# Patient Record
Sex: FEMALE | Race: BLACK OR AFRICAN AMERICAN | Employment: OTHER | URBAN - METROPOLITAN AREA
[De-identification: names, ages, dates, MRNs, and addresses within clinical notes are randomized per-mention and may not be internally consistent; named-entity substitution may affect disease eponyms.]

---

## 2017-01-06 RX ORDER — HYDROCODONE BITARTRATE AND ACETAMINOPHEN 10; 325 MG/1; MG/1
1 TABLET ORAL
Qty: 140 TAB | Refills: 0 | Status: SHIPPED | OUTPATIENT
Start: 2017-01-06 | End: 2017-02-02 | Stop reason: SDUPTHER

## 2017-01-13 ENCOUNTER — TELEPHONE (OUTPATIENT)
Dept: INTERNAL MEDICINE CLINIC | Age: 57
End: 2017-01-13

## 2017-01-13 RX ORDER — FENTANYL 75 UG/H
1 PATCH TRANSDERMAL
Qty: 10 PATCH | Refills: 0 | Status: SHIPPED | OUTPATIENT
Start: 2017-01-13 | End: 2017-02-22 | Stop reason: SDUPTHER

## 2017-02-03 RX ORDER — HYDROCODONE BITARTRATE AND ACETAMINOPHEN 10; 325 MG/1; MG/1
1 TABLET ORAL
Qty: 140 TAB | Refills: 0 | Status: SHIPPED | OUTPATIENT
Start: 2017-02-03 | End: 2017-03-10 | Stop reason: SDUPTHER

## 2017-02-23 ENCOUNTER — TELEPHONE (OUTPATIENT)
Dept: INTERNAL MEDICINE CLINIC | Age: 57
End: 2017-02-23

## 2017-02-23 RX ORDER — FENTANYL 75 UG/H
1 PATCH TRANSDERMAL
Qty: 10 PATCH | Refills: 0 | Status: SHIPPED | OUTPATIENT
Start: 2017-02-23 | End: 2017-04-06 | Stop reason: SDUPTHER

## 2017-03-10 ENCOUNTER — OFFICE VISIT (OUTPATIENT)
Dept: INTERNAL MEDICINE CLINIC | Age: 57
End: 2017-03-10

## 2017-03-10 VITALS
HEART RATE: 74 BPM | TEMPERATURE: 98.2 F | DIASTOLIC BLOOD PRESSURE: 70 MMHG | OXYGEN SATURATION: 95 % | WEIGHT: 198.2 LBS | HEIGHT: 71 IN | RESPIRATION RATE: 18 BRPM | BODY MASS INDEX: 27.75 KG/M2 | SYSTOLIC BLOOD PRESSURE: 122 MMHG

## 2017-03-10 DIAGNOSIS — I87.2 VENOUS INSUFFICIENCY: ICD-10-CM

## 2017-03-10 DIAGNOSIS — I10 ESSENTIAL HYPERTENSION: ICD-10-CM

## 2017-03-10 DIAGNOSIS — Z00.00 ROUTINE GENERAL MEDICAL EXAMINATION AT A HEALTH CARE FACILITY: ICD-10-CM

## 2017-03-10 DIAGNOSIS — D57.00 HB-SS DISEASE WITH CRISIS (HCC): Primary | ICD-10-CM

## 2017-03-10 DIAGNOSIS — B18.2 CHRONIC HEPATITIS C WITHOUT HEPATIC COMA (HCC): ICD-10-CM

## 2017-03-10 DIAGNOSIS — F32.A DEPRESSION, UNSPECIFIED DEPRESSION TYPE: ICD-10-CM

## 2017-03-10 RX ORDER — HYDROCODONE BITARTRATE AND ACETAMINOPHEN 10; 325 MG/1; MG/1
1 TABLET ORAL
Qty: 140 TAB | Refills: 0 | Status: SHIPPED | OUTPATIENT
Start: 2017-03-10 | End: 2017-04-27 | Stop reason: SDUPTHER

## 2017-03-10 NOTE — PROGRESS NOTES
Chief Complaint   Patient presents with    Sickle Cell Disease     3 month follow up    1. Have you been to the ER, urgent care clinic since your last visit? Hospitalized since your last visit? No    2. Have you seen or consulted any other health care providers outside of the [address] [place] [address] since your last visit? Include any pap smears or colon screening.  No

## 2017-03-11 NOTE — PROGRESS NOTES
[facility]  [place] [address]  [address] [place] [address]  Phone:  [phone]  Fax: [phone]       Chief Complaint   Patient presents with    Sickle Cell Disease     3 month follow up    . SUBJECTIVE:    Ciara Perez is a 49 y.o. female comes in for return visit. She is doing fairly well as far as her sickle cell disease is concerned although she did have a flare about a month ago lasting for about two weeks but it did not require hospitalization. Her analgesics help significantly. She has been successfully treated for her hepatitis C. She has been taking her antihypertensive medication as prescribed and has had no cardiovascular symptoms. She remains quite stable with no evidence of major depression. She does remain on her Celexa. Finally, she lives a sedentary life. We have talked about improving this on many occasions. Current Outpatient Prescriptions   Medication Sig Dispense Refill    HYDROcodone-acetaminophen (NORCO)  mg tablet Take 1 Tab by mouth every six (6) hours as needed for Pain. Max Daily Amount: 4 Tabs. 140 Tab 0    fentaNYL (DURAGESIC) 75 mcg/hr 1 Patch by TransDERmal route every seventy-two (72) hours. Max Daily Amount: 1 Patch. 10 Patch 0    folic acid (FOLVITE) 1 mg tablet TAKE 1 TABLET BY MOUTH EVERY DAY 90 Tab 3    citalopram (CELEXA) 10 mg tablet TAKE 1 TABLET BY MOUTH EVERY NIGHT AT BEDTIME 90 Tab 3    losartan-hydrochlorothiazide (HYZAAR) 50-12.5 mg per tablet TAKE 1 TABLET BY MOUTH DAILY 90 Tab 3    OMEPRAZOLE, BULK, Take 20 mg by mouth daily. 20 mg, PO, daily, 0 Refills      fluocinoNIDE (LIDEX) 0.05 % topical cream two (2) times daily as needed.        Past Medical History:   Diagnosis Date    Anemia     SC hemoglobinopathy    Chronic pain     Depression     Hypertension     Liver disease     hepatitis C     Past Surgical History:   Procedure Laterality Date    CHEST SURGERY PROCEDURE UNLISTED      status post thor for removal of a benign     HX CHOLECYSTECTOMY      HX ORTHOPAEDIC      bony curettage of an ostial myelitis focus     Allergies   Allergen Reactions    Dilaudid [Hydromorphone (Bulk)] Itching    Percocet [Oxycodone-Acetaminophen] Itching         REVIEW OF SYSTEMS:  General: negative for - chills or fever  ENT: negative for - headaches, nasal congestion or tinnitus  Respiratory: negative for - cough, hemoptysis, shortness of breath or wheezing  Cardiovascular : negative for - chest pain, edema, palpitations or shortness of breath  Gastrointestinal: negative for - abdominal pain, blood in stools, heartburn or nausea/vomiting  Genito-Urinary: no dysuria, trouble voiding, or hematuria  Musculoskeletal: negative for - gait disturbance, joint pain, joint stiffness or joint swelling  Neurological: no TIA or stroke symptoms  Hematologic: no bruises, no bleeding, no swollen glands  Integument: no lumps, mole changes, nail changes or rash  Endocrine: no malaise/lethargy or unexpected weight changes      Social History     Social History    Marital status:      Spouse name: N/A    Number of children: 2    Years of education: N/A     Occupational History    disabled---Sickle cell disease      Social History Main Topics    Smoking status: Never Smoker    Smokeless tobacco: Never Used    Alcohol use No    Drug use: No    Sexual activity: Yes     Partners: Male     Other Topics Concern    None     Social History Narrative     History reviewed. No pertinent family history. OBJECTIVE:    Visit Vitals    /70 (BP 1 Location: Left arm, BP Patient Position: Sitting)    Pulse 74    Temp 98.2 °F (36.8 °C) (Oral)    Resp 18    Ht 5' 11\" (1.803 m)    Wt 198 lb 3.2 oz (89.9 kg)    SpO2 95%    BMI 27.64 kg/m2     CONSTITUTIONAL: well , well nourished, appears age appropriate  EYES: perrla, eom intact  ENMT:moist mucous membranes, pharynx clear  NECK: supple.  Thyroid normal  RESPIRATORY: Chest: clear to ascultation and percussion   CARDIOVASCULAR: Heart: regular rate and rhythm  GASTROINTESTINAL: Abdomen: soft, bowel sounds active  HEMATOLOGIC: no pathological lymph nodes palpated  MUSCULOSKELETAL: Extremities: no edema, pulse 1+   INTEGUMENT: No unusual rashes or suspicious skin lesions noted. Nails appear normal.  NEUROLOGIC: non-focal exam   MENTAL STATUS: alert and oriented, appropriate affect      ASSESSMENT:  1. Hb-SS disease with crisis ([facility]    2. Essential hypertension    3. Depression, unspecified depression type    4. Chronic hepatitis C without hepatic coma ([facility] [address]    5. Venous insufficiency    6. Routine general medical examination at a health care facility        PLAN:    1. Her sickle cell disease appears to be doing fairly well. She will continue her analgesics as needed. 2. Blood pressure is excellent. No adjustments are made. 3. Her depression is quite stable and she will therefore continue the Celexa. 4. Her hepatitis C has been successfully treated. Her last viral load was undetectable. 5. I again encourage the patient to increase her physical activity. She continues to remain on a sedentary existence. This might help reduce her crises. 6.  She continues to have intermittent swelling of her lower extremities which is orthostatic in nature. This is related to venous insufficiency. .  Orders Placed This Encounter    HYDROcodone-acetaminophen (NORCO)  mg tablet         Follow-up Disposition:  Return in about 4 months (around 7/10/2017). Morro Flores MD    This is a Subsequent Medicare Annual Wellness Visit providing Personalized Prevention Plan Services (PPPS) (Performed 12 months after initial AWV and PPPS )    I have reviewed the patient's medical history in detail and updated the computerized patient record.      History     Past Medical History:   Diagnosis Date    Anemia     SC hemoglobinopathy    Chronic pain     Depression     Hypertension     Liver disease     hepatitis C      Past Surgical History:   Procedure Laterality Date    CHEST SURGERY PROCEDURE UNLISTED      status post thor for removal of a benign     HX CHOLECYSTECTOMY      HX ORTHOPAEDIC      bony curettage of an ostial myelitis focus     Current Outpatient Prescriptions   Medication Sig Dispense Refill    HYDROcodone-acetaminophen (NORCO)  mg tablet Take 1 Tab by mouth every six (6) hours as needed for Pain. Max Daily Amount: 4 Tabs. 140 Tab 0    fentaNYL (DURAGESIC) 75 mcg/hr 1 Patch by TransDERmal route every seventy-two (72) hours. Max Daily Amount: 1 Patch. 10 Patch 0    folic acid (FOLVITE) 1 mg tablet TAKE 1 TABLET BY MOUTH EVERY DAY 90 Tab 3    citalopram (CELEXA) 10 mg tablet TAKE 1 TABLET BY MOUTH EVERY NIGHT AT BEDTIME 90 Tab 3    losartan-hydrochlorothiazide (HYZAAR) 50-12.5 mg per tablet TAKE 1 TABLET BY MOUTH DAILY 90 Tab 3    OMEPRAZOLE, BULK, Take 20 mg by mouth daily. 20 mg, PO, daily, 0 Refills      fluocinoNIDE (LIDEX) 0.05 % topical cream two (2) times daily as needed. Allergies   Allergen Reactions    Dilaudid [Hydromorphone (Bulk)] Itching    Percocet [Oxycodone-Acetaminophen] Itching     History reviewed. No pertinent family history. Social History   Substance Use Topics    Smoking status: Never Smoker    Smokeless tobacco: Never Used    Alcohol use No     Patient Active Problem List   Diagnosis Code    Sickle cell anemia ([facility] D57.1    Hypertension I10    Venous insufficiency I87.2    Hepatitis C B19.20    Depression F32.9    Furuncle of axilla L02.429    Orbital fracture ([facility] S02.80XA       Depression Risk Factor Screening:     PHQ 2 / 9, over the last two weeks 10/3/2014   Little interest or pleasure in doing things Not at all   Feeling down, depressed or hopeless Not at all   Total Score PHQ 2 0     Alcohol Risk Factor Screening:    On any occasion during the past 3 months, have you had more than 3 drinks containing alcohol? No    Do you average more than 7 drinks per week? No      Functional Ability and Level of Safety:     Hearing Loss   none    Activities of Daily Living   Self-care. Requires assistance with: no ADLs    Fall Risk   No flowsheet data found. Abuse Screen   Patient is not abused    Review of Systems   A comprehensive review of systems was negative. Physical Examination     Evaluation of Cognitive Function:  Mood/affect:  happy  Appearance: age appropriate  Family member/caregiver input: na    Visit Vitals    /70 (BP 1 Location: Left arm, BP Patient Position: Sitting)    Pulse 74    Temp 98.2 °F (36.8 °C) (Oral)    Resp 18    Ht 5' 11\" (1.803 m)    Wt 198 lb 3.2 oz (89.9 kg)    SpO2 95%    BMI 27.64 kg/m2     General appearance: alert, cooperative, no distress, appears stated age  Neck: supple, symmetrical, trachea midline, no adenopathy, thyroid: not enlarged, symmetric, no tenderness/mass/nodules, no carotid bruit and no JVD  Lungs: clear to auscultation bilaterally  Heart: regular rate and rhythm, S1, S2 normal, no murmur, click, rub or gallop  Abdomen: soft, non-tender. Bowel sounds normal. No masses,  no organomegaly  Extremities: extremities normal, atraumatic, no cyanosis or edema  Neurologic: Grossly normal    Patient Care Team:  Serg Blanchard MD as PCP - General (Internal Medicine)    Advice/Referrals/Counseling   Education and counseling provided:  Are appropriate based on today's review and evaluation      Assessment/Plan       ICD-10-CM ICD-9-CM    1. Hb-SS disease with crisis ([facility] D57.00 282.62    2. Essential hypertension I10 401.9    3. Depression, unspecified depression type F32.9 311    4. Chronic hepatitis C without hepatic coma (HCC) B18.2 070.54    5. Venous insufficiency I87.2 459.81    6. Routine general medical examination at a health care facility Z00.00 V70.0    .

## 2017-04-06 ENCOUNTER — OFFICE VISIT (OUTPATIENT)
Dept: INTERNAL MEDICINE CLINIC | Age: 57
End: 2017-04-06

## 2017-04-06 VITALS
BODY MASS INDEX: 28.36 KG/M2 | WEIGHT: 202.6 LBS | HEIGHT: 71 IN | DIASTOLIC BLOOD PRESSURE: 74 MMHG | OXYGEN SATURATION: 91 % | TEMPERATURE: 98.6 F | SYSTOLIC BLOOD PRESSURE: 131 MMHG | HEART RATE: 77 BPM | RESPIRATION RATE: 18 BRPM

## 2017-04-06 DIAGNOSIS — F32.A DEPRESSION, UNSPECIFIED DEPRESSION TYPE: ICD-10-CM

## 2017-04-06 DIAGNOSIS — D57.00 HB-SS DISEASE WITH CRISIS (HCC): Primary | ICD-10-CM

## 2017-04-06 DIAGNOSIS — I10 ESSENTIAL HYPERTENSION: ICD-10-CM

## 2017-04-06 RX ORDER — FENTANYL 75 UG/H
1 PATCH TRANSDERMAL
Qty: 10 PATCH | Refills: 0 | Status: SHIPPED | OUTPATIENT
Start: 2017-04-06 | End: 2017-04-27 | Stop reason: SDUPTHER

## 2017-04-06 RX ORDER — FENTANYL 75 UG/H
1 PATCH TRANSDERMAL
Qty: 10 PATCH | Refills: 0 | Status: SHIPPED | OUTPATIENT
Start: 2017-04-06 | End: 2017-04-06 | Stop reason: SDUPTHER

## 2017-04-06 NOTE — MR AVS SNAPSHOT
Visit Information Date & Time Provider Department Dept. Phone Encounter #  
 4/6/2017 12:30 PM Gatito Nunez MD [facility] [phone] [id number] Follow-up Instructions Return keep old apt. Your Appointments 7/13/2017 11:45 AM  
Any with Gatito Nunez MD  
[address] [place] [address] and [facility]) Appt Note: 4 MONTH FOLLOW UP  
 [address] [facility] [place]  
  
   
 [address] [id number] Upcoming Health Maintenance Date Due FOBT Q 1 YEAR AGE 50-75 4/2/2016 Pneumococcal 19-64 Highest Risk (2 of 3 - PCV13) 5/17/2017 BREAST CANCER SCRN MAMMOGRAM 9/17/2017 PAP AKA CERVICAL CYTOLOGY 4/2/2018 DTaP/Tdap/Td series (2 - Td) 5/17/2026 Allergies as of 4/6/2017  Review Complete On: 4/6/2017 By: Gatito Nunez MD  
  
 Severity Noted Reaction Type Reactions Dilaudid [Hydromorphone (Bulk)]  10/03/2014    Itching Percocet [Oxycodone-acetaminophen]  10/03/2014    Itching Current Immunizations  Never Reviewed No immunizations on file. Not reviewed this visit You Were Diagnosed With   
  
 Codes Comments Hb-SS disease with crisis [facility])    -  Primary ICD-10-CM: D57.00 ICD-9-CM: 282.62 Essential hypertension     ICD-10-CM: I10 
ICD-9-CM: 401.9 Depression, unspecified depression type     ICD-10-CM: F32.9 ICD-9-CM: 375 Vitals BP Pulse Temp Resp Height(growth percentile) Weight(growth percentile) 131/74 (BP 1 Location: Left arm, BP Patient Position: Sitting) 77 98.6 °F (37 °C) (Oral) 18 5' 11\" (1.803 m) 202 lb 9.6 oz (91.9 kg) SpO2 BMI OB Status Smoking Status 91% 28.26 kg/m2 Having regular periods Never Smoker BMI and BSA Data Body Mass Index Body Surface Area  
 28.26 kg/m 2 2.15 m 2 Preferred Pharmacy Pharmacy Name Phone [phone]  [phone] Your Updated Medication List  
  
   
This list is accurate as of: 17  1:20 PM.  Always use your most recent med list.  
  
  
  
  
 citalopram 10 mg tablet Commonly known as:  CELEXA  
TAKE 1 TABLET BY MOUTH EVERY NIGHT AT BEDTIME  
  
 fentaNYL 75 mcg/hr Commonly known as:  DURAGESIC  
1 Patch by TransDERmal route every seventy-two (72) hours. Max Daily Amount: 1 Patch. fluocinoNIDE 0.05 % topical cream  
Commonly known as:  LIDEX  
two (2) times daily as needed. folic acid 1 mg tablet Commonly known as:  FOLVITE  
TAKE 1 TABLET BY MOUTH EVERY DAY  
  
 HYDROcodone-acetaminophen  mg tablet Commonly known as:  Landon Kilts Take 1 Tab by mouth every six (6) hours as needed for Pain. Max Daily Amount: 4 Tabs. losartan-hydroCHLOROthiazide 50-12.5 mg per tablet Commonly known as:  HYZAAR  
TAKE 1 TABLET BY MOUTH DAILY  
  
 OMEPRAZOLE (BULK) Take 20 mg by mouth daily. 20 mg, PO, daily, 0 Refills Prescriptions Printed Refills  
 fentaNYL (DURAGESIC) 75 mcg/hr 0 Si Patch by TransDERmal route every seventy-two (72) hours. Max Daily Amount: 1 Patch. Class: Print Route: TransDERmal  
  
Follow-up Instructions Return keep old apt. Introducing \[facility]\"" & HEALTH SERVICES! [facility] introduces [employer] patient portal. Now you can access parts of your medical record, email your doctor's office, and request medication refills online. 1. In your internet browser, go to [email or web address] 2. Click on the First Time User? Click Here link in the Sign In box. You will see the New Member Sign Up page. 3. Enter your [employer] Access Code exactly as it appears below. You will not need to use this code after youve completed the sign-up process.  If you do not sign up before the expiration date, you must request a new code. · [employer] Access Code: [email or web address] Expires: 6/8/2017  3:47 PM 
 
4. Enter the last four digits of your Social Security Number (xxxx) and Date of Birth (mm/dd/yyyy) as indicated and click Submit. You will be taken to the next sign-up page. 5. Create a ActiveEon ID. This will be your ActiveEon login ID and cannot be changed, so think of one that is secure and easy to remember. 6. Create a [employer] password. You can change your password at any time. 7. Enter your Password Reset Question and Answer. This can be used at a later time if you forget your password. 8. Enter your e-mail address. You will receive e-mail notification when new information is available in [address] 9. Click Sign Up. You can now view and download portions of your medical record. 10. Click the Download Summary menu link to download a portable copy of your medical information. If you have questions, please visit the Frequently Asked Questions section of the [employer] website. Remember, ActiveEon is NOT to be used for urgent needs. For medical emergencies, dial 911. Now available from your iPhone and Android! Please provide this summary of care documentation to your next provider. Your primary care clinician is listed as Ezequiel Lynne. If you have any questions after today's visit, please call [phone].

## 2017-04-06 NOTE — PROGRESS NOTES
Chief Complaint   Patient presents with    Medication Refill     Fentanyl   1. Have you been to the ER, urgent care clinic since your last visit? Hospitalized since your last visit? No    2. Have you seen or consulted any other health care providers outside of the [address] [place] [address] since your last visit? Include any pap smears or colon screening.  No

## 2017-04-06 NOTE — PROGRESS NOTES
Chief Complaint   Patient presents with    Medication Refill     Fentanyl   . SUBECTIVE:    Carolyn Trinidad is a 49 y.o. female comes in for a return visit complaining of increasing pain in the pelvic area and her back. These are manifestations of her sickle crises. She needs a refill of her fentanyl. She does have her hydrocodone. Fortunately, she has not felt that she has had to go to the emergency room. She has been taking her antihypertensive medication as prescribed. She is quite stable as far as her depression is concerned. Current Outpatient Prescriptions   Medication Sig Dispense Refill    fentaNYL (DURAGESIC) 75 mcg/hr 1 Patch by TransDERmal route every seventy-two (72) hours. Max Daily Amount: 1 Patch. 10 Patch 0    HYDROcodone-acetaminophen (NORCO)  mg tablet Take 1 Tab by mouth every six (6) hours as needed for Pain. Max Daily Amount: 4 Tabs. 646 Tab 0    folic acid (FOLVITE) 1 mg tablet TAKE 1 TABLET BY MOUTH EVERY DAY 90 Tab 3    citalopram (CELEXA) 10 mg tablet TAKE 1 TABLET BY MOUTH EVERY NIGHT AT BEDTIME 90 Tab 3    losartan-hydrochlorothiazide (HYZAAR) 50-12.5 mg per tablet TAKE 1 TABLET BY MOUTH DAILY 90 Tab 3    OMEPRAZOLE, BULK, Take 20 mg by mouth daily. 20 mg, PO, daily, 0 Refills      fluocinoNIDE (LIDEX) 0.05 % topical cream two (2) times daily as needed.        Past Medical History:   Diagnosis Date    Anemia     SC hemoglobinopathy    Chronic pain     Depression     Hypertension     Liver disease     hepatitis C     Past Surgical History:   Procedure Laterality Date    CHEST SURGERY PROCEDURE UNLISTED      status post thor for removal of a benign     HX CHOLECYSTECTOMY      HX ORTHOPAEDIC      bony curettage of an ostial myelitis focus     Allergies   Allergen Reactions    Dilaudid [Hydromorphone (Bulk)] Itching    Percocet [Oxycodone-Acetaminophen] Itching       REVIEW OF SYSTEMS:  Review of Systems - Negative except   ENT ROS: negative for - headaches, hearing change, nasal congestion, oral lesions, tinnitus, visual changes or   Respiratory ROS: no cough, shortness of breath, or wheezing  Cardiovascular ROS: no chest pain or dyspnea on exertion  Gastrointestinal ROS: no abdominal pain, change in bowel habits, or black or blood  Genito-Urinary ROS: no dysuria, trouble voiding, or hematuria  Musculoskeletal ROS: negative  Neurological ROS: no TIA or stroke symptoms      Social History     Social History    Marital status:      Spouse name: N/A    Number of children: 2    Years of education: N/A     Occupational History    disabled---Sickle cell disease      Social History Main Topics    Smoking status: Never Smoker    Smokeless tobacco: Never Used    Alcohol use No    Drug use: No    Sexual activity: Yes     Partners: Male     Other Topics Concern    None     Social History Narrative   r  History reviewed. No pertinent family history. OBJECTIVE:  Visit Vitals    /74 (BP 1 Location: Left arm, BP Patient Position: Sitting)    Pulse 77    Temp 98.6 °F (37 °C) (Oral)    Resp 18    Ht 5' 11\" (1.803 m)    Wt 202 lb 9.6 oz (91.9 kg)    SpO2 91%    BMI 28.26 kg/m2     ENT: perrla,  eom intact  NECK: supple. Thyroid normal, no JVD  CHEST: clear to ascultation and percussion   HEART: regular rate and rhythm  ABD: soft, bowel sounds active,   EXTREMITIES: no edema, pulse 1+  INTEGUMENT: clear      ASSESSMENT:  1. Hb-SS disease with crisis ([address]    2. Essential hypertension    3. Depression, unspecified depression type        PLAN:    1. Her sickle cell disease is mild to moderate at this point. She will be given a fentanyl patch. 2. Her blood pressure is excellent today. 3. Her depression is quite stable.             .  Orders Placed This Encounter    DISCONTD: fentaNYL (DURAGESIC) 75 mcg/hr    fentaNYL ([address]) 75 mcg/hr       Follow-up Disposition:  Return keep old apt.      Anuel Archer MD

## 2017-04-27 ENCOUNTER — OFFICE VISIT (OUTPATIENT)
Dept: INTERNAL MEDICINE CLINIC | Age: 57
End: 2017-04-27

## 2017-04-27 VITALS
BODY MASS INDEX: 27.96 KG/M2 | RESPIRATION RATE: 18 BRPM | HEIGHT: 71 IN | HEART RATE: 79 BPM | OXYGEN SATURATION: 93 % | TEMPERATURE: 98.1 F | SYSTOLIC BLOOD PRESSURE: 111 MMHG | WEIGHT: 199.7 LBS | DIASTOLIC BLOOD PRESSURE: 73 MMHG

## 2017-04-27 DIAGNOSIS — D57.00 HB-SS DISEASE WITH CRISIS (HCC): Primary | ICD-10-CM

## 2017-04-27 DIAGNOSIS — F32.A DEPRESSION, UNSPECIFIED DEPRESSION TYPE: ICD-10-CM

## 2017-04-27 DIAGNOSIS — I10 ESSENTIAL HYPERTENSION: ICD-10-CM

## 2017-04-27 DIAGNOSIS — N39.0 URINARY TRACT INFECTION WITHOUT HEMATURIA, SITE UNSPECIFIED: ICD-10-CM

## 2017-04-27 RX ORDER — HYDROCODONE BITARTRATE AND ACETAMINOPHEN 10; 325 MG/1; MG/1
1 TABLET ORAL
Qty: 140 TAB | Refills: 0 | Status: SHIPPED | OUTPATIENT
Start: 2017-04-27 | End: 2017-06-13 | Stop reason: SDUPTHER

## 2017-04-27 RX ORDER — FENTANYL 75 UG/H
1 PATCH TRANSDERMAL
Qty: 10 PATCH | Refills: 0 | Status: SHIPPED | OUTPATIENT
Start: 2017-04-27 | End: 2017-07-14 | Stop reason: SDUPTHER

## 2017-04-27 NOTE — MR AVS SNAPSHOT
Visit Information Date & Time Provider Department Dept. Phone Encounter #  
 4/27/2017 11:30 AM Elliott Cosme MD [facility] and Primary [facility] [phone] [id number] Your Appointments 7/13/2017 11:45 AM  
Any with Elliott Cosme MD  
[address] [place] [address] and Primary Care [facility]) Appt Note: 4 MONTH FOLLOW UP  
 [address] 1 [facility]  
  
   
 [address] [id number] Upcoming Health Maintenance Date Due FOBT Q 1 YEAR AGE 50-75 4/2/2016 Pneumococcal 19-64 Highest Risk (2 of 3 - PCV13) 5/17/2017 BREAST CANCER SCRN MAMMOGRAM 9/17/2017 PAP AKA CERVICAL CYTOLOGY 4/2/2018 DTaP/Tdap/Td series (2 - Td) 5/17/2026 Allergies as of 4/27/2017  Review Complete On: 4/27/2017 By: Julianna Kohli Severity Noted Reaction Type Reactions Dilaudid [Hydromorphone (Bulk)]  10/03/2014    Itching Percocet [Oxycodone-acetaminophen]  10/03/2014    Itching Current Immunizations  Never Reviewed No immunizations on file. Not reviewed this visit You Were Diagnosed With   
  
 Codes Comments Hb-SS disease with crisis [facility])    -  Primary ICD-10-CM: D57.00 ICD-9-CM: 282.62 Essential hypertension     ICD-10-CM: I10 
ICD-9-CM: 401.9 Urinary tract infection without hematuria, site unspecified     ICD-10-CM: N39.0 ICD-9-CM: 599.0 Depression, unspecified depression type     ICD-10-CM: F32.9 ICD-9-CM: 654 Vitals BP Pulse Temp Resp Height(growth percentile) Weight(growth percentile) 111/73 (BP 1 Location: Right arm, BP Patient Position: Sitting) 79 98.1 °F (36.7 °C) (Oral) 18 5' 11\" (1.803 m) 199 lb 11.2 oz (90.6 kg) SpO2 BMI OB Status Smoking Status 93% 27.85 kg/m2 Postmenopausal Never Smoker BMI and BSA Data Body Mass Index Body Surface Area  
 27.85 kg/m 2 2.13 m 2 Preferred Pharmacy Pharmacy Name Phone [address] [phone] Your Updated Medication List  
  
   
This list is accurate as of: 17  2:30 PM.  Always use your most recent med list.  
  
  
  
  
 fentaNYL 75 mcg/hr Commonly known as:  DURAGESIC  
1 Patch by TransDERmal route every seventy-two (72) hours. Max Daily Amount: 1 Patch. fluocinoNIDE 0.05 % topical cream  
Commonly known as:  LIDEX  
two (2) times daily as needed. folic acid 1 mg tablet Commonly known as:  FOLVITE  
TAKE 1 TABLET BY MOUTH EVERY DAY  
  
 HYDROcodone-acetaminophen  mg tablet Commonly known as:  [place] Take 1 Tab by mouth every six (6) hours as needed for Pain. Max Daily Amount: 4 Tabs. KEFLEX 500 mg capsule Generic drug:  cephALEXin Take 500 mg by mouth four (4) times daily. losartan-hydroCHLOROthiazide 50-12.5 mg per tablet Commonly known as:  HYZAAR  
TAKE 1 TABLET BY MOUTH DAILY  
  
 metoclopramide HCl 10 mg tablet Commonly known as:  REGLAN Take 10 mg by mouth four (4) times daily as needed. OMEPRAZOLE (BULK) Take 20 mg by mouth daily. 20 mg, PO, daily, 0 Refills Prescriptions Printed Refills HYDROcodone-acetaminophen (NORCO)  mg tablet 0 Sig: Take 1 Tab by mouth every six (6) hours as needed for Pain. Max Daily Amount: 4 Tabs. Class: Print Route: Oral  
 fentaNYL (DURAGESIC) 75 mcg/hr 0 Si Patch by TransDERmal route every seventy-two (72) hours. Max Daily Amount: 1 Patch. Class: Print Route: TransDERmal  
  
We Performed the Following URINALYSIS W/ RFLX MICROSCOPIC [24846 CPT(R)] Introducing [facility] & HEALTH SERVICES! Anh Nichole introduces [facility] patient portal. Now you can access parts of your medical record, email your doctor's office, and request medication refills online.    
 
1. In your internet browser, go to [email or web address] 2. Click on the First Time User? Click Here link in the Sign In box. You will see the New Member Sign Up page. 3. Enter your Peek Access Code exactly as it appears below. You will not need to use this code after youve completed the sign-up process. If you do not sign up before the expiration date, you must request a new code. · Peek Access Code: [email or web address] Expires: 6/8/2017  3:47 PM 
 
4. Enter the last four digits of your Social Security Number (xxxx) and Date of Birth (mm/dd/yyyy) as indicated and click Submit. You will be taken to the next sign-up page. 5. Create a HERCAMOSHOPt ID. This will be your Peek login ID and cannot be changed, so think of one that is secure and easy to remember. 6. Create a Peek password. You can change your password at any time. 7. Enter your Password Reset Question and Answer. This can be used at a later time if you forget your password. 8. Enter your e-mail address. You will receive e-mail notification when new information is available in [address] 9. Click Sign Up. You can now view and download portions of your medical record. 10. Click the Download Summary menu link to download a portable copy of your medical information. If you have questions, please visit the Frequently Asked Questions section of the Peek website. Remember, Peek is NOT to be used for urgent needs. For medical emergencies, dial 911. Now available from your iPhone and Android! Please provide this summary of care documentation to your next provider. Your primary care clinician is listed as Ezequiel Lynne. If you have any questions after today's visit, please call [phone].

## 2017-04-27 NOTE — PROGRESS NOTES
1. Have you been to the ER, urgent care clinic since your last visit? Hospitalized since your last visit? Yes Where: [facility]    2. Have you seen or consulted any other health care providers outside of the [address] [place] [address] since your last visit? Include any pap smears or colon screening.  Yes Reason for visit: UTI

## 2017-04-29 LAB
APPEARANCE UR: ABNORMAL
BILIRUB UR QL STRIP: NEGATIVE
COLOR UR: YELLOW
GLUCOSE UR QL: NEGATIVE
HGB UR QL STRIP: NEGATIVE
KETONES UR QL STRIP: NEGATIVE
LEUKOCYTE ESTERASE UR QL STRIP: NEGATIVE
MICRO URNS: ABNORMAL
NITRITE UR QL STRIP: NEGATIVE
PH UR STRIP: 6 [PH] (ref 5–7.5)
PROT UR QL STRIP: NEGATIVE
SP GR UR: 1.01 (ref 1–1.03)
UROBILINOGEN UR STRIP-MCNC: 1 MG/DL (ref 0.2–1)

## 2017-04-29 NOTE — PROGRESS NOTES
[facility] and Primary Care  [place] [address]  [address] [facility] [address]  Phone:  [phone]  Fax: [phone]       Chief Complaint   Patient presents with   [facility] Follow Up   . SUBJECTIVE:    Lindsey Bianchi is a 49 y.o. female Subjective:  Comes in for return visit stating that she has done well other than having gone to the emergency room because of recurrent nausea and vomiting. She was evaluated and the only thing that was found was a urinary tract infection. She had no fever, chills or sweats, as well as back pain. She was treated with antibiotics. She is actually better now. She continues with her chronic pain relating to sickle cell disease and needs refills on analgesics. She has lost weight over the last year or so and wonders if she needs to continue the antihypertensive medication. Her depression is doing extremely well. She is status post treatment of her hepatitis C with [facility], which was successful at eradicating the infection. Current Outpatient Prescriptions   Medication Sig Dispense Refill    HYDROcodone-acetaminophen (NORCO)  mg tablet Take 1 Tab by mouth every six (6) hours as needed for Pain. Max Daily Amount: 4 Tabs. 140 Tab 0    fentaNYL (DURAGESIC) 75 mcg/hr 1 Patch by TransDERmal route every seventy-two (72) hours. Max Daily Amount: 1 Patch. 10 Patch 0    metoclopramide HCl (REGLAN) 10 mg tablet Take 10 mg by mouth four (4) times daily as needed.  cephALEXin (KEFLEX) 500 mg capsule Take 500 mg by mouth four (4) times daily.  folic acid (FOLVITE) 1 mg tablet TAKE 1 TABLET BY MOUTH EVERY DAY 90 Tab 3    losartan-hydrochlorothiazide (HYZAAR) 50-12.5 mg per tablet TAKE 1 TABLET BY MOUTH DAILY 90 Tab 3    OMEPRAZOLE, BULK, Take 20 mg by mouth daily. 20 mg, PO, daily, 0 Refills      fluocinoNIDE (LIDEX) 0.05 % topical cream two (2) times daily as needed.        Past Medical History:   Diagnosis Date    Anemia SC hemoglobinopathy    Chronic pain     Depression     Hypertension     Liver disease     hepatitis C     Past Surgical History:   Procedure Laterality Date    CHEST SURGERY PROCEDURE UNLISTED      status post thor for removal of a benign     HX CHOLECYSTECTOMY      HX ORTHOPAEDIC      bony curettage of an ostial myelitis focus     Allergies   Allergen Reactions    Dilaudid [Hydromorphone (Bulk)] Itching    Percocet [Oxycodone-Acetaminophen] Itching         REVIEW OF SYSTEMS:  General: negative for - chills or fever  ENT: negative for - headaches, nasal congestion or tinnitus  Respiratory: negative for - cough, hemoptysis, shortness of breath or wheezing  Cardiovascular : negative for - chest pain, edema, palpitations or shortness of breath  Gastrointestinal: negative for - abdominal pain, blood in stools, heartburn or nausea/vomiting  Genito-Urinary: no dysuria, trouble voiding, or hematuria  Musculoskeletal: negative for - gait disturbance, joint pain, joint stiffness or joint swelling  Neurological: no TIA or stroke symptoms  Hematologic: no bruises, no bleeding, no swollen glands  Integument: no lumps, mole changes, nail changes or rash  Endocrine: no malaise/lethargy or unexpected weight changes      Social History     Social History    Marital status:      Spouse name: N/A    Number of children: 2    Years of education: N/A     Occupational History    disabled---Sickle cell disease      Social History Main Topics    Smoking status: Never Smoker    Smokeless tobacco: Never Used    Alcohol use No    Drug use: No    Sexual activity: Yes     Partners: Male     Other Topics Concern    None     Social History Narrative     History reviewed. No pertinent family history.     OBJECTIVE:    Visit Vitals    /73 (BP 1 Location: Right arm, BP Patient Position: Sitting)    Pulse 79    Temp 98.1 °F (36.7 °C) (Oral)    Resp 18    Ht 5' 11\" (1.803 m)    Wt 199 lb 11.2 oz (90.6 kg)    SpO2 93%    BMI 27.85 kg/m2     CONSTITUTIONAL: well , well nourished, appears age appropriate  EYES: perrla, eom intact  ENMT:moist mucous membranes, pharynx clear  NECK: supple. Thyroid normal  RESPIRATORY: Chest: clear to ascultation and percussion   CARDIOVASCULAR: Heart: regular rate and rhythm  GASTROINTESTINAL: Abdomen: soft, bowel sounds active  HEMATOLOGIC: no pathological lymph nodes palpated  MUSCULOSKELETAL: Extremities: no edema, pulse 1+   INTEGUMENT: No unusual rashes or suspicious skin lesions noted. Nails appear normal.  NEUROLOGIC: non-focal exam   MENTAL STATUS: alert and oriented, appropriate affect      ASSESSMENT:  1. Hb-SS disease with crisis ([address]    2. Essential hypertension    3. Urinary tract infection without hematuria, site unspecified    4. Depression, unspecified depression type        PLAN:  Assessment/Plan:  1. Her sickle cell disease appears to be doing fairly well. She will continue analgesics as prescribed. This has been an effective tool to minimize hospitalization. 2. Blood pressure is excellent today, no adjustments are made. 3. She had a urinary tract infection and it does not appear that there were any upper tract symptoms associated with it. Urinalysis will be checked. 4. Her depression is quite stable, no adjustments are made. 5. I encouraged her to increase her physical activity. .  Orders Placed This Encounter    URINALYSIS W/ RFLX MICROSCOPIC    HYDROcodone-acetaminophen (NORCO)  mg tablet    fentaNYL (DURAGESIC) 75 mcg/hr         Follow-up Disposition:  Return in about 3 months (around 7/27/2017).       Michelle Hugo MD

## 2017-05-22 ENCOUNTER — APPOINTMENT (OUTPATIENT)
Dept: GENERAL RADIOLOGY | Age: 57
DRG: 812 | End: 2017-05-22
Attending: EMERGENCY MEDICINE
Payer: MEDICARE

## 2017-05-22 ENCOUNTER — HOSPITAL ENCOUNTER (INPATIENT)
Age: 57
LOS: 10 days | Discharge: HOME | DRG: 812 | End: 2017-06-01
Attending: EMERGENCY MEDICINE | Admitting: INTERNAL MEDICINE
Payer: MEDICARE

## 2017-05-22 DIAGNOSIS — D57.00 SICKLE CELL PAIN CRISIS (HCC): Primary | ICD-10-CM

## 2017-05-22 LAB
ALBUMIN SERPL BCP-MCNC: 4.2 G/DL (ref 3.5–5)
ALBUMIN/GLOB SERPL: 0.8 {RATIO} (ref 1.1–2.2)
ALP SERPL-CCNC: 106 U/L (ref 45–117)
ALT SERPL-CCNC: 15 U/L (ref 12–78)
ANION GAP BLD CALC-SCNC: 10 MMOL/L (ref 5–15)
APPEARANCE UR: CLEAR
AST SERPL W P-5'-P-CCNC: 41 U/L (ref 15–37)
BACTERIA URNS QL MICRO: NEGATIVE /HPF
BASOPHILS # BLD AUTO: 0 K/UL (ref 0–0.1)
BASOPHILS # BLD: 0 % (ref 0–1)
BILIRUB SERPL-MCNC: 2.3 MG/DL (ref 0.2–1)
BILIRUB UR QL: NEGATIVE
BUN SERPL-MCNC: 11 MG/DL (ref 6–20)
BUN/CREAT SERPL: 12 (ref 12–20)
CALCIUM SERPL-MCNC: 9.3 MG/DL (ref 8.5–10.1)
CHLORIDE SERPL-SCNC: 108 MMOL/L (ref 97–108)
CO2 SERPL-SCNC: 23 MMOL/L (ref 21–32)
COLOR UR: NORMAL
CREAT SERPL-MCNC: 0.91 MG/DL (ref 0.55–1.02)
EOSINOPHIL # BLD: 0 K/UL (ref 0–0.4)
EOSINOPHIL NFR BLD: 0 % (ref 0–7)
EPITH CASTS URNS QL MICRO: NORMAL /LPF
ERYTHROCYTE [DISTWIDTH] IN BLOOD BY AUTOMATED COUNT: 14.4 % (ref 11.5–14.5)
GLOBULIN SER CALC-MCNC: 5.4 G/DL (ref 2–4)
GLUCOSE SERPL-MCNC: 97 MG/DL (ref 65–100)
GLUCOSE UR STRIP.AUTO-MCNC: NEGATIVE MG/DL
HCT VFR BLD AUTO: 25.8 % (ref 35–47)
HGB BLD-MCNC: 8.8 G/DL (ref 11.5–16)
HGB UR QL STRIP: NEGATIVE
HYALINE CASTS URNS QL MICRO: NORMAL /LPF (ref 0–5)
KETONES UR QL STRIP.AUTO: NEGATIVE MG/DL
LEUKOCYTE ESTERASE UR QL STRIP.AUTO: NEGATIVE
LYMPHOCYTES # BLD AUTO: 36 % (ref 12–49)
LYMPHOCYTES # BLD: 2.4 K/UL (ref 0.8–3.5)
MCH RBC QN AUTO: 32.7 PG (ref 26–34)
MCHC RBC AUTO-ENTMCNC: 34.1 G/DL (ref 30–36.5)
MCV RBC AUTO: 95.9 FL (ref 80–99)
MONOCYTES # BLD: 1 K/UL (ref 0–1)
MONOCYTES NFR BLD AUTO: 14 % (ref 5–13)
NEUTS SEG # BLD: 3.3 K/UL (ref 1.8–8)
NEUTS SEG NFR BLD AUTO: 50 % (ref 32–75)
NITRITE UR QL STRIP.AUTO: NEGATIVE
PH UR STRIP: 6.5 [PH] (ref 5–8)
PLATELET # BLD AUTO: 277 K/UL (ref 150–400)
POTASSIUM SERPL-SCNC: 3.3 MMOL/L (ref 3.5–5.1)
PROT SERPL-MCNC: 9.6 G/DL (ref 6.4–8.2)
PROT UR STRIP-MCNC: NEGATIVE MG/DL
RBC # BLD AUTO: 2.69 M/UL (ref 3.8–5.2)
RBC #/AREA URNS HPF: NORMAL /HPF (ref 0–5)
RETICS/RBC NFR AUTO: 4.3 % (ref 0.7–2.1)
SODIUM SERPL-SCNC: 141 MMOL/L (ref 136–145)
SP GR UR REFRACTOMETRY: 1.01 (ref 1–1.03)
UA: UC IF INDICATED,UAUC: NORMAL
UROBILINOGEN UR QL STRIP.AUTO: 1 EU/DL (ref 0.2–1)
WBC # BLD AUTO: 6.8 K/UL (ref 3.6–11)
WBC URNS QL MICRO: NORMAL /HPF (ref 0–4)

## 2017-05-22 PROCEDURE — 74011250636 HC RX REV CODE- 250/636: Performed by: EMERGENCY MEDICINE

## 2017-05-22 PROCEDURE — 36415 COLL VENOUS BLD VENIPUNCTURE: CPT | Performed by: EMERGENCY MEDICINE

## 2017-05-22 PROCEDURE — 80053 COMPREHEN METABOLIC PANEL: CPT | Performed by: EMERGENCY MEDICINE

## 2017-05-22 PROCEDURE — 76937 US GUIDE VASCULAR ACCESS: CPT

## 2017-05-22 PROCEDURE — 85025 COMPLETE CBC W/AUTO DIFF WBC: CPT | Performed by: EMERGENCY MEDICINE

## 2017-05-22 PROCEDURE — 74011250636 HC RX REV CODE- 250/636: Performed by: INTERNAL MEDICINE

## 2017-05-22 PROCEDURE — 85045 AUTOMATED RETICULOCYTE COUNT: CPT | Performed by: EMERGENCY MEDICINE

## 2017-05-22 PROCEDURE — 96360 HYDRATION IV INFUSION INIT: CPT

## 2017-05-22 PROCEDURE — 65660000000 HC RM CCU STEPDOWN

## 2017-05-22 PROCEDURE — 96372 THER/PROPH/DIAG INJ SC/IM: CPT

## 2017-05-22 PROCEDURE — 74011250637 HC RX REV CODE- 250/637: Performed by: INTERNAL MEDICINE

## 2017-05-22 PROCEDURE — 81001 URINALYSIS AUTO W/SCOPE: CPT | Performed by: EMERGENCY MEDICINE

## 2017-05-22 PROCEDURE — 93005 ELECTROCARDIOGRAM TRACING: CPT

## 2017-05-22 PROCEDURE — 74011250637 HC RX REV CODE- 250/637: Performed by: EMERGENCY MEDICINE

## 2017-05-22 PROCEDURE — 99284 EMERGENCY DEPT VISIT MOD MDM: CPT

## 2017-05-22 PROCEDURE — 71020 XR CHEST PA LAT: CPT

## 2017-05-22 RX ORDER — ONDANSETRON 2 MG/ML
4 INJECTION INTRAMUSCULAR; INTRAVENOUS
Status: DISCONTINUED | OUTPATIENT
Start: 2017-05-22 | End: 2017-05-28 | Stop reason: SDUPTHER

## 2017-05-22 RX ORDER — SODIUM CHLORIDE 0.9 % (FLUSH) 0.9 %
5-10 SYRINGE (ML) INJECTION AS NEEDED
Status: DISCONTINUED | OUTPATIENT
Start: 2017-05-22 | End: 2017-05-25 | Stop reason: SDUPTHER

## 2017-05-22 RX ORDER — SODIUM CHLORIDE 0.9 % (FLUSH) 0.9 %
5-10 SYRINGE (ML) INJECTION EVERY 8 HOURS
Status: DISCONTINUED | OUTPATIENT
Start: 2017-05-22 | End: 2017-05-29

## 2017-05-22 RX ORDER — HYDROMORPHONE HYDROCHLORIDE 2 MG/ML
2 INJECTION, SOLUTION INTRAMUSCULAR; INTRAVENOUS; SUBCUTANEOUS
Status: DISCONTINUED | OUTPATIENT
Start: 2017-05-22 | End: 2017-05-26

## 2017-05-22 RX ORDER — ENOXAPARIN SODIUM 100 MG/ML
40 INJECTION SUBCUTANEOUS EVERY 24 HOURS
Status: DISCONTINUED | OUTPATIENT
Start: 2017-05-22 | End: 2017-06-01 | Stop reason: HOSPADM

## 2017-05-22 RX ORDER — HYDROMORPHONE HYDROCHLORIDE 1 MG/ML
2 INJECTION, SOLUTION INTRAMUSCULAR; INTRAVENOUS; SUBCUTANEOUS
Status: DISCONTINUED | OUTPATIENT
Start: 2017-05-22 | End: 2017-05-22

## 2017-05-22 RX ORDER — LOSARTAN POTASSIUM 50 MG/1
50 TABLET ORAL DAILY
Status: DISCONTINUED | OUTPATIENT
Start: 2017-05-23 | End: 2017-05-22

## 2017-05-22 RX ORDER — ONDANSETRON 4 MG/1
8 TABLET, ORALLY DISINTEGRATING ORAL
Status: COMPLETED | OUTPATIENT
Start: 2017-05-22 | End: 2017-05-22

## 2017-05-22 RX ORDER — SODIUM CHLORIDE 9 MG/ML
150 INJECTION, SOLUTION INTRAVENOUS CONTINUOUS
Status: DISCONTINUED | OUTPATIENT
Start: 2017-05-22 | End: 2017-05-23

## 2017-05-22 RX ORDER — SODIUM CHLORIDE 0.9 % (FLUSH) 0.9 %
5-10 SYRINGE (ML) INJECTION AS NEEDED
Status: DISCONTINUED | OUTPATIENT
Start: 2017-05-22 | End: 2017-05-29

## 2017-05-22 RX ORDER — VALSARTAN 80 MG/1
80 TABLET ORAL DAILY
Status: DISCONTINUED | OUTPATIENT
Start: 2017-05-23 | End: 2017-06-01 | Stop reason: HOSPADM

## 2017-05-22 RX ORDER — FOLIC ACID 1 MG/1
1 TABLET ORAL DAILY
Status: DISCONTINUED | OUTPATIENT
Start: 2017-05-23 | End: 2017-06-01 | Stop reason: HOSPADM

## 2017-05-22 RX ORDER — SODIUM CHLORIDE 0.9 % (FLUSH) 0.9 %
5-10 SYRINGE (ML) INJECTION EVERY 8 HOURS
Status: DISCONTINUED | OUTPATIENT
Start: 2017-05-22 | End: 2017-05-25 | Stop reason: SDUPTHER

## 2017-05-22 RX ORDER — DIPHENHYDRAMINE HCL 25 MG
25 CAPSULE ORAL
Status: DISCONTINUED | OUTPATIENT
Start: 2017-05-22 | End: 2017-05-23

## 2017-05-22 RX ORDER — POTASSIUM CHLORIDE 750 MG/1
40 TABLET, FILM COATED, EXTENDED RELEASE ORAL
Status: COMPLETED | OUTPATIENT
Start: 2017-05-22 | End: 2017-05-22

## 2017-05-22 RX ORDER — HYDROMORPHONE HYDROCHLORIDE 1 MG/ML
2 INJECTION, SOLUTION INTRAMUSCULAR; INTRAVENOUS; SUBCUTANEOUS
Status: COMPLETED | OUTPATIENT
Start: 2017-05-22 | End: 2017-05-22

## 2017-05-22 RX ORDER — ACETAMINOPHEN 325 MG/1
650 TABLET ORAL
Status: DISCONTINUED | OUTPATIENT
Start: 2017-05-22 | End: 2017-06-01 | Stop reason: HOSPADM

## 2017-05-22 RX ADMIN — HYDROMORPHONE HYDROCHLORIDE 2 MG: 2 INJECTION, SOLUTION INTRAMUSCULAR; INTRAVENOUS; SUBCUTANEOUS at 23:58

## 2017-05-22 RX ADMIN — ENOXAPARIN SODIUM 40 MG: 40 INJECTION SUBCUTANEOUS at 20:28

## 2017-05-22 RX ADMIN — ONDANSETRON 8 MG: 4 TABLET, ORALLY DISINTEGRATING ORAL at 14:06

## 2017-05-22 RX ADMIN — HYDROMORPHONE HYDROCHLORIDE 2 MG: 1 INJECTION, SOLUTION INTRAMUSCULAR; INTRAVENOUS; SUBCUTANEOUS at 14:06

## 2017-05-22 RX ADMIN — DIPHENHYDRAMINE HYDROCHLORIDE 25 MG: 25 CAPSULE ORAL at 21:19

## 2017-05-22 RX ADMIN — SODIUM CHLORIDE 1000 ML: 900 INJECTION, SOLUTION INTRAVENOUS at 15:55

## 2017-05-22 RX ADMIN — HYDROMORPHONE HYDROCHLORIDE 2 MG: 2 INJECTION, SOLUTION INTRAMUSCULAR; INTRAVENOUS; SUBCUTANEOUS at 20:46

## 2017-05-22 RX ADMIN — Medication 10 ML: at 20:29

## 2017-05-22 RX ADMIN — SODIUM CHLORIDE 150 ML/HR: 900 INJECTION, SOLUTION INTRAVENOUS at 20:28

## 2017-05-22 RX ADMIN — HYDROMORPHONE HYDROCHLORIDE 2 MG: 1 INJECTION, SOLUTION INTRAMUSCULAR; INTRAVENOUS; SUBCUTANEOUS at 17:54

## 2017-05-22 RX ADMIN — POTASSIUM CHLORIDE 40 MEQ: 750 TABLET, FILM COATED, EXTENDED RELEASE ORAL at 20:28

## 2017-05-22 NOTE — ED NOTES
TRANSFER - OUT REPORT:    Verbal report given to Dotty Zambrano RN (name) on Guille Mckee  being transferred to Neuro Room 3118 (unit) for routine progression of care       Report consisted of patients Situation, Background, Assessment and   Recommendations(SBAR). Information from the following report(s) SBAR, ED Summary, MAR and Med Rec Status was reviewed with the receiving nurse. Lines:   Peripheral IV 70/23/75 Left Cephalic (Active)   Site Assessment Clean, dry, & intact 5/22/2017  3:44 PM   Phlebitis Assessment 0 5/22/2017  3:44 PM   Infiltration Assessment 0 5/22/2017  3:44 PM   Dressing Status Clean, dry, & intact;New;Occlusive 5/22/2017  3:44 PM   Dressing Type Tape;Transparent 5/22/2017  3:44 PM   Hub Color/Line Status Pink;Capped;Flushed;Patent 5/22/2017  3:44 PM   Action Taken Other (comment) 5/22/2017  3:44 PM        Opportunity for questions and clarification was provided.

## 2017-05-22 NOTE — IP AVS SNAPSHOT
[address] [facility] 
[phone] Patient: Hank Goins MRN: [id number] :1960 You are allergic to the following Allergen Reactions Dilaudid (Hydromorphone (Bulk)) Itching Can tolerate with benadryl and ondansetron Percocet (Oxycodone-Acetaminophen) Itching Recent Documentation Height Weight BMI OB Status Smoking Status 1.778 m 86.1 kg 27.24 kg/m2 Postmenopausal Never Smoker Emergency Contacts Name Discharge Info Relation Home Work Mobile [address] [place] [address] CAREGIVER [3] Brother [24]   [phone] Sherron Lowe  Daughter [21]   [phone] About your hospitalization You were admitted on:  May 22, 2017 You last received care in the:  [facility] 3 NEUROSCIENCE TELEMETRY You were discharged on:  2017 Unit phone number:  [phone] Why you were hospitalized Your primary diagnosis was:  Not on File Your diagnoses also included:  Sickle Cell Crisis (Hcc), Hypertension, Hepatitis C, Nsvt (Nonsustained Ventricular Tachycardia) (Hcc) Providers Seen During Your Hospitalizations Provider Role Specialty Primary office phone Perry Franklin MD Attending Provider Emergency Medicine [phone] Belen Amanda MD Attending Provider Internal Medicine [phone] Your Primary Care Physician (PCP) Primary Care Physician Office Phone Office Fax [address] [facility] [phone] Follow-up Information Follow up With Details Comments Contact Info Belen Amanda MD On 2017 12:15pm [address] [place] [address] 
[phone] Your Appointments 2017 12:15 PM EDT Any with Belen Amanda MD  
[address] [place] [address] and Primary Care [address] [place] [address]) [address] [address]  
[phone] Thursday July 13, 2017 11:45 AM EDT Any with Za Fernandez MD  
[address] [place] [address] and Primary Care [facility]) [address] [phone]  
[phone] Current Discharge Medication List  
  
CONTINUE these medications which have NOT CHANGED Dose & Instructions Dispensing Information Comments Morning Noon Evening Bedtime  
 fentaNYL 75 mcg/hr Commonly known as:  Jodi Moh Your last dose was: Your next dose is:    
   
   
 Dose:  1 Patch 1 Patch by TransDERmal route every seventy-two (72) hours. Max Daily Amount: 1 Patch. Quantity:  10 Patch Refills:  0  
     
   
   
   
  
 fluocinoNIDE 0.05 % topical cream  
Commonly known as:  LIDEX Your last dose was: Your next dose is:    
   
   
 two (2) times daily as needed. Refills:  0  
     
   
   
   
  
 folic acid 1 mg tablet Commonly known as:  [employer] Your last dose was: Your next dose is: TAKE 1 TABLET BY MOUTH EVERY DAY Quantity:  90 Tab Refills:  3  
 **Patient requests 90 days supply** HYDROcodone-acetaminophen  mg tablet Commonly known as:  Landon Medeiros Your last dose was: Your next dose is:    
   
   
 Dose:  1 Tab Take 1 Tab by mouth every six (6) hours as needed for Pain. Max Daily Amount: 4 Tabs. Quantity:  140 Tab Refills:  0  
     
   
   
   
  
 losartan-hydroCHLOROthiazide 50-12.5 mg per tablet Commonly known as:  HYZAAR Your last dose was: Your next dose is: TAKE 1 TABLET BY MOUTH DAILY Quantity:  90 Tab Refills:  3  
 **Patient requests 90 days supply**  
    
   
   
   
  
 metoclopramide HCl 10 mg tablet Commonly known as:  REGLAN Your last dose was: Your next dose is:    
   
   
 Dose:  10 mg Take 10 mg by mouth four (4) times daily as needed. Refills:  0  
     
   
   
   
  
 OMEPRAZOLE (BULK) Your last dose was: Your next dose is:    
   
   
 Dose:  20 mg Take 20 mg by mouth daily. 20 mg, PO, daily, 0 Refills Refills:  0 Discharge Instructions General Discharge Instructions Patient ID: Dwight Vo [id number] 
49 y.o. 
1960 Patient Instructions The following personal items were collected during your admission and were returned to you. Take Home Medications What to do at [facility] Recommended diet: Regular Diet Recommended activity: Activity as tolerated Follow-up with Milagro Lew MD  in 1 week. Information obtained by : 
I understand that if any problems occur once I am at home I am to contact my physician. I understand and acknowledge receipt of the instructions indicated above. Physician's or R.N.'s Signature                                                                  Date/Time Patient or Representative Signature                                                          Date/Time Discharge Orders None ACO Transitions of Care Introducing [employer] [address] Violetta Miller offers a voluntary care coordination program to provide high quality service and care to [facility] fee-for-service beneficiaries. Carinadianelys Tejeda was designed to help you enhance your health and well-being through the following services: ? Transitions of Care  support for individuals who are transitioning from one care setting to another (example: Hospital to home). ?  Chronic and Complex Care Coordination  support for individuals and caregivers of those with serious or chronic illnesses or with more than one chronic (ongoing) condition and those who take a number of different medications. If you meet specific medical criteria, a [facility] [address] may call you directly to coordinate your care with your primary care physician and your other care providers. For questions about the [facility] programs, please, contact your physicians office. For general questions or additional information about Accountable Care Organizations: 
Please visit www.medicare.gov/acos. html or call 1-800-MEDICARE (6-278.748.1803) TTY users should call 8-650.939.1464. Introducing [facility] & HEALTH SERVICES! Avril Sandoval introduces [employer] patient portal. Now you can access parts of your medical record, email your doctor's office, and request medication refills online. 1. In your internet browser, go to [email or web address] 2. Click on the First Time User? Click Here link in the Sign In box. You will see the New Member Sign Up page. 3. Enter your [employer] Access Code exactly as it appears below. You will not need to use this code after youve completed the sign-up process. If you do not sign up before the expiration date, you must request a new code. · [employer] Access Code: [email or web address] Expires: 6/8/2017  3:47 PM 
 
4. Enter the last four digits of your Social Security Number (xxxx) and Date of Birth (mm/dd/yyyy) as indicated and click Submit. You will be taken to the next sign-up page. 5. Create a Data Sentry Solutionst ID. This will be your [employer] login ID and cannot be changed, so think of one that is secure and easy to remember. 6. Create a [employer] Sentry Solutionst password. You can change your password at any time. 7. Enter your Password Reset Question and Answer. This can be used at a later time if you forget your password. 8. Enter your e-mail address.  You will receive e-mail notification when new information is available in [email or web address]. 9. Click Sign Up. You can now view and download portions of your medical record. 10. Click the Download Summary menu link to download a portable copy of your medical information. If you have questions, please visit the Frequently Asked Questions section of the [employer] website. Remember, [employer] is NOT to be used for urgent needs. For medical emergencies, dial 911. Now available from your iPhone and Android! General Information Please provide this summary of care documentation to your next provider. Patient Signature:  ____________________________________________________________ Date:  ____________________________________________________________  
  
Juan Breath Provider Signature:  ____________________________________________________________ Date:  ____________________________________________________________

## 2017-05-22 NOTE — H&P
Hospitalist Admission Note    NAME: Juan Pablo Saldana   :  1960   MRN:  [id number]     Date/Time:  2017 4:30 PM    Patient PCP: Leydi Tijerina MD  ________________________________________________________________________    My assessment of this patient's clinical condition and my plan of care is as follows. Note: Pt admitted under Dr Lisa Burkett service, I have made him aware over the phone    Assessment / Plan:  Sickle cell crisis POA  Admit  Aggressive IV hydration  Pain management with IV hydromorphone (take PRN fentanyl patch at home)  Keep O2 > 93%  CXR done in ED, results pending  Continue Folic Acid  Monitor CRP, Retic Count & Hb    Hypokalemia  Replete and monitor     Hypertension   Continue ARBs while holding HCTZ for now  PRN nitropaste    Hepatitis C   S/p Rx, claims to be in remission    Code Status: full  Surrogate Decision Maker: daughter    DVT Prophylaxis: Lovenox  GI Prophylaxis: not indicated    Baseline: functional        Subjective:   CHIEF COMPLAINT: back pain    HISTORY OF PRESENT ILLNESS:     Nia Diaz is a 49 y.o.  female who presents with back pain since yesterday. As per patient, she was exposed to air conditioner air at work and since then she started having back pain. Pain is all over the back, moving up and down, non radiating, 8/10 in intensity, constant. Pt denies any fever, chills, nausea, vomiting, bowel or urinary disturbance, chest pain. Pt noted to have history of sickle cell disease and In ED pt noted to have elevated retic count. We were asked to admit for work up and evaluation of the above problems.      Past Medical History:   Diagnosis Date    Anemia     SC hemoglobinopathy    Chronic pain     Depression     Hypertension     Liver disease     hepatitis C        Past Surgical History:   Procedure Laterality Date    CHEST SURGERY PROCEDURE UNLISTED      status post thor for removal of a benign     HX CHOLECYSTECTOMY  HX ORTHOPAEDIC      bony curettage of an ostial myelitis focus       Social History   Substance Use Topics    Smoking status: Never Smoker    Smokeless tobacco: Never Used    Alcohol use No        Family history; positive for sickle cell    Allergies   Allergen Reactions    Dilaudid [Hydromorphone (Bulk)] Itching     Can tolerate with benadryl and ondansetron    Percocet [Oxycodone-Acetaminophen] Itching        Prior to Admission medications    Medication Sig Start Date End Date Taking? Authorizing Provider   HYDROcodone-acetaminophen (NORCO)  mg tablet Take 1 Tab by mouth every six (6) hours as needed for Pain. Max Daily Amount: 4 Tabs. 4/27/17  Yes Janay Gonsalez MD   fentaNYL (DURAGESIC) 75 mcg/hr 1 Patch by TransDERmal route every seventy-two (72) hours. Max Daily Amount: 1 Patch. 4/27/17  Yes Janay Gonsalez MD   metoclopramide HCl (REGLAN) 10 mg tablet Take 10 mg by mouth four (4) times daily as needed. Yes Historical Provider   folic acid (FOLVITE) 1 mg tablet TAKE 1 TABLET BY MOUTH EVERY DAY 10/25/16  Yes Janay Gonsalez MD   losartan-hydrochlorothiazide [facility]) 50-12.5 mg per tablet TAKE 1 TABLET BY MOUTH DAILY 10/25/16  Yes Janay Gonsalez MD   OMEPRAZOLE, BULK, Take 20 mg by mouth daily. 20 mg, PO, daily, 0 Refills 5/27/16  Yes Historical Provider   fluocinoNIDE (LIDEX) 0.05 % topical cream two (2) times daily as needed. 8/1/14  Yes Historical Provider       REVIEW OF SYSTEMS:     I am not able to complete the review of systems because:    The patient is intubated and sedated    The patient has altered mental status due to his acute medical problems    The patient has baseline aphasia from prior stroke(s)    The patient has baseline dementia and is not reliable historian    The patient is in acute medical distress and unable to provide information           Total of 12 systems reviewed as follows:       POSITIVE= underlined text  Negative = text not underlined  General:  fever, chills, sweats, generalized weakness, weight loss/gain,      loss of appetite   Eyes:    blurred vision, eye pain, loss of vision, double vision  ENT:    rhinorrhea, pharyngitis   Respiratory:   cough, sputum production, SOB, MARS, wheezing, pleuritic pain   Cardiology:   chest pain, palpitations, orthopnea, PND, edema, syncope   Gastrointestinal:  abdominal pain , N/V, diarrhea, dysphagia, constipation, bleeding   Genitourinary:  frequency, urgency, dysuria, hematuria, incontinence   Muskuloskeletal :  arthralgia, myalgia, back pain  Hematology:  easy bruising, nose or gum bleeding, lymphadenopathy   Dermatological: rash, ulceration, pruritis, color change / jaundice  Endocrine:   hot flashes or polydipsia   Neurological:  headache, dizziness, confusion, focal weakness, paresthesia,     Speech difficulties, memory loss, gait difficulty  Psychological: Feelings of anxiety, depression, agitation    Objective:   VITALS:    Visit Vitals    BP (!) 166/92 (BP 1 Location: Left arm, BP Patient Position: At rest)    Pulse (!) 104    Temp 98.5 °F (36.9 °C)    Resp 18    Ht 5' 10\" (1.778 m)    Wt 86.1 kg (189 lb 13.1 oz)    SpO2 100%    BMI 27.24 kg/m2       PHYSICAL EXAM:    General:    Alert, cooperative, no distress, appears stated age. HEENT: Atraumatic, anicteric sclerae, pink conjunctivae     No oral ulcers, mucosa moist, throat clear, dentition fair  Neck:  Supple, symmetrical,  thyroid: non tender  Lungs:   Clear to auscultation bilaterally. No Wheezing or Rhonchi. No rales. Chest wall:  No tenderness  No Accessory muscle use. Heart:   Regular  rhythm,  No  murmur   No edema  Abdomen:   Soft, non-tender. Not distended. Bowel sounds normal  Extremities: No cyanosis. No clubbing,      Skin turgor normal, Capillary refill normal, Radial dial pulse 2+  Skin:     Not pale. Not Jaundiced  No rashes   Psych:  Good insight. Not depressed. Not anxious or agitated. Neurologic: EOMs intact. No facial asymmetry.  No aphasia or slurred speech. Symmetrical strength, Sensation grossly intact. Alert and oriented X 4.     _______________________________________________________________________  Care Plan discussed with:    Comments   Patient y    Family      RN y    Care Manager                    Consultant:      _______________________________________________________________________  Expected  Disposition:   Home with Family y   HH/PT/OT/RN    SNF/LTC    JEREMY    ________________________________________________________________________  TOTAL TIME: 61 Minutes    Critical Care Provided     Minutes non procedure based      Comments    y Reviewed previous records   >50% of visit spent in counseling and coordination of care y Discussion with patient and questions answered       ________________________________________________________________________  Signed: Gab Kang MD    Procedures: see electronic medical records for all procedures/Xrays and details which were not copied into this note but were reviewed prior to creation of Plan. LAB DATA REVIEWED:    Recent Results (from the past 24 hour(s))   CBC WITH AUTOMATED DIFF    Collection Time: 05/22/17  2:46 PM   Result Value Ref Range    WBC 6.8 3.6 - 11.0 K/uL    RBC 2.69 (L) 3.80 - 5.20 M/uL    HGB 8.8 (L) 11.5 - 16.0 g/dL    HCT 25.8 (L) 35.0 - 47.0 %    MCV 95.9 80.0 - 99.0 FL    MCH 32.7 26.0 - 34.0 PG    MCHC 34.1 30.0 - 36.5 g/dL    RDW 14.4 11.5 - 14.5 %    PLATELET 297 440 - 232 K/uL    NEUTROPHILS 50 32 - 75 %    LYMPHOCYTES 36 12 - 49 %    MONOCYTES 14 (H) 5 - 13 %    EOSINOPHILS 0 0 - 7 %    BASOPHILS 0 0 - 1 %    ABS. NEUTROPHILS 3.3 1.8 - 8.0 K/UL    ABS. LYMPHOCYTES 2.4 0.8 - 3.5 K/UL    ABS. MONOCYTES 1.0 0.0 - 1.0 K/UL    ABS. EOSINOPHILS 0.0 0.0 - 0.4 K/UL    ABS.  BASOPHILS 0.0 0.0 - 0.1 K/UL   METABOLIC PANEL, COMPREHENSIVE    Collection Time: 05/22/17  2:46 PM   Result Value Ref Range    Sodium 141 136 - 145 mmol/L    Potassium 3.3 (L) 3.5 - 5.1 mmol/L Chloride 108 97 - 108 mmol/L    CO2 23 21 - 32 mmol/L    Anion gap 10 5 - 15 mmol/L    Glucose 97 65 - 100 mg/dL    BUN 11 6 - 20 MG/DL    Creatinine 0.91 0.55 - 1.02 MG/DL    BUN/Creatinine ratio 12 12 - 20      GFR est AA >60 >60 ml/min/1.73m2    GFR est non-AA >60 >60 ml/min/1.73m2    Calcium 9.3 8.5 - 10.1 MG/DL    Bilirubin, total 2.3 (H) 0.2 - 1.0 MG/DL    ALT (SGPT) 15 12 - 78 U/L    AST (SGOT) 41 (H) 15 - 37 U/L    Alk.  phosphatase 106 45 - 117 U/L    Protein, total 9.6 (H) 6.4 - 8.2 g/dL    Albumin 4.2 3.5 - 5.0 g/dL    Globulin 5.4 (H) 2.0 - 4.0 g/dL    A-G Ratio 0.8 (L) 1.1 - 2.2     RETICULOCYTE COUNT    Collection Time: 05/22/17  2:46 PM   Result Value Ref Range    Reticulocyte count 4.3 (H) 0.7 - 2.1 %   URINALYSIS W/ REFLEX CULTURE    Collection Time: 05/22/17  2:49 PM   Result Value Ref Range    Color YELLOW/STRAW      Appearance CLEAR CLEAR      Specific gravity 1.011 1.003 - 1.030      pH (UA) 6.5 5.0 - 8.0      Protein NEGATIVE  NEG mg/dL    Glucose NEGATIVE  NEG mg/dL    Ketone NEGATIVE  NEG mg/dL    Bilirubin NEGATIVE  NEG      Blood NEGATIVE  NEG      Urobilinogen 1.0 0.2 - 1.0 EU/dL    Nitrites NEGATIVE  NEG      Leukocyte Esterase NEGATIVE  NEG      WBC 0-4 0 - 4 /hpf    RBC 0-5 0 - 5 /hpf    Epithelial cells FEW FEW /lpf    Bacteria NEGATIVE  NEG /hpf    UA:UC IF INDICATED CULTURE NOT INDICATED BY UA RESULT CNI      Hyaline cast 0-2 0 - 5 /lpf

## 2017-05-22 NOTE — ED PROVIDER NOTES
HPI Comments: Carter Antunez is a 49 y.o. female, pmhx significant for sickle cell trait, HTN, hepatitis C, and anemia, who presents ambulatory with  to the ED c/o gradually worsening pain consistent with sickle cell crisis x today. Pt states that she has pain in her upper back, and middle back. She states that she was at work today, noting that the [facility] was hitting her back directly, which she thought is what triggered this crisis. PCP had told pt that if her pain reaches this severity, to just go to the ED. Pt states that she is a very difficult stick, and that once she is admitted, PCP comes to the hospital to put a central line in her. Pt has not taken any pain medication PTA. Pt states that dilaudid works well for her pain when it reaches this stage. Pt denies SOB, chest pain, fever, chills, nausea, or vomiting. PCP: Sarahi Delcid MD    PSHx: Significant for cholecystectomy, bony curettage of an ostial myelitis focus   Social Hx: (-) tobacco, (-) EtOH,  (-) Illicit Drugs    There are no other complaints, changes, or physical findings at this time. The history is provided by the patient. No  was used. Past Medical History:   Diagnosis Date    Anemia     SC hemoglobinopathy    Chronic pain     Depression     Hypertension     Liver disease     hepatitis C       Past Surgical History:   Procedure Laterality Date    CHEST SURGERY PROCEDURE UNLISTED      status post thor for removal of a benign     HX CHOLECYSTECTOMY      HX ORTHOPAEDIC      bony curettage of an ostial myelitis focus         History reviewed. No pertinent family history.     Social History     Social History    Marital status:      Spouse name: N/A    Number of children: 2    Years of education: N/A     Occupational History    disabled---Sickle cell disease      Social History Main Topics    Smoking status: Never Smoker    Smokeless tobacco: Never Used    Alcohol use No    Drug use: No    Sexual activity: Yes     Partners: Male     Other Topics Concern    Not on file     Social History Narrative         ALLERGIES: Dilaudid [hydromorphone (bulk)] and Percocet [oxycodone-acetaminophen]    Review of Systems   Constitutional: Negative. Negative for chills and fever. HENT: Negative. Eyes: Negative. Respiratory: Negative. Negative for shortness of breath. Cardiovascular: Negative. Negative for chest pain. Gastrointestinal: Negative. Negative for nausea and vomiting. Genitourinary: Negative. Musculoskeletal: Positive for back pain (upper and middle). Skin: Negative. Neurological: Negative. Psychiatric/Behavioral: Negative. All other systems reviewed and are negative. Patient Vitals for the past 12 hrs:   Temp Pulse Resp BP SpO2   05/22/17 1807 - 85 16 147/86 100 %   05/22/17 1206 98.5 °F (36.9 °C) (!) 104 18 (!) 166/92 100 %     Physical Exam   Constitutional: She is oriented to person, place, and time. She appears well-developed and well-nourished. No distress. HENT:   Head: Normocephalic and atraumatic. Mouth/Throat: No oropharyngeal exudate. Eyes: Conjunctivae are normal. Pupils are equal, round, and reactive to light. Right eye exhibits no discharge. Left eye exhibits no discharge. No scleral icterus. Neck: Normal range of motion. Neck supple. No JVD present. Cardiovascular: Normal rate, regular rhythm, normal heart sounds and intact distal pulses. Exam reveals no gallop and no friction rub. No murmur heard. Pulmonary/Chest: Effort normal and breath sounds normal. No stridor. No respiratory distress. She has no wheezes. She has no rales. She exhibits no tenderness. Abdominal: Soft. Normal aorta and bowel sounds are normal. She exhibits no distension and no mass. There is no hepatosplenomegaly. There is no tenderness. There is no rigidity, no rebound, no guarding, no CVA tenderness, no tenderness at McBurney's point and negative De Guzman's sign. Musculoskeletal: She exhibits tenderness. Lumbar back: She exhibits tenderness. Neurological: She is alert and oriented to person, place, and time. She displays normal reflexes. No cranial nerve deficit. She exhibits normal muscle tone. Coordination normal.   Skin: Skin is warm. No rash noted. She is not diaphoretic. There is pallor. Nursing note and vitals reviewed. MDM  Number of Diagnoses or Management Options  Sickle cell pain crisis [facility]):   Diagnosis management comments:   DDx: pain crisis, aplastic anemia, acute chest syndrome, sepsis, pneumonia, osteomyelitis    Pt presents complaining of progressive back pain similar to prior crisis. Has no evidence of pneumonia or sepsis. Due to uncontrolled pain, will admit to hospitalist services. Amount and/or Complexity of Data Reviewed  Clinical lab tests: ordered and reviewed  Tests in the radiology section of CPT®: ordered and reviewed  Tests in the medicine section of CPT®: reviewed and ordered  Review and summarize past medical records: yes  Discuss the patient with other providers: yes (Hospitalist)  Independent visualization of images, tracings, or specimens: yes    Risk of Complications, Morbidity, and/or Mortality  Presenting problems: moderate  Diagnostic procedures: moderate  Management options: moderate    Patient Progress  Patient progress: stable    ED Course       Procedures    Progress Note:  3:04 PM  Pt re-evaluated. She states that her pain is better, but not significantly improved. Pt requesting admission. Will consult with PCP. Written by OZZIE Fernández, as dictated by Lynette Cisneros MD.     Progress Note:  3:20 PM  Attempted to call PCP 6 times. All calls were dropped after various lengths of time on hold. Still unable to contact PCP at this time.   Written by OZZIE Fernández, as dictated by Lynette Cisneros MD.    HOSPITALIST CONSULT NOTE:   4:08 PM  Lynette Cisneros MD spoke with Kulwinder Ramirez MD,   Specialty: Hospitalist  Discussed pt's hx, disposition, and available diagnostic and imaging results. Reviewed care plans. Consultant will evaluate pt for admission. Written by OZZIE Fernándezibjake, as dictated by Lynette Cisneros MD.    LABORATORY TESTS:  Recent Results (from the past 12 hour(s))   CBC WITH AUTOMATED DIFF    Collection Time: 05/22/17  2:46 PM   Result Value Ref Range    WBC 6.8 3.6 - 11.0 K/uL    RBC 2.69 (L) 3.80 - 5.20 M/uL    HGB 8.8 (L) 11.5 - 16.0 g/dL    HCT 25.8 (L) 35.0 - 47.0 %    MCV 95.9 80.0 - 99.0 FL    MCH 32.7 26.0 - 34.0 PG    MCHC 34.1 30.0 - 36.5 g/dL    RDW 14.4 11.5 - 14.5 %    PLATELET 629 541 - 746 K/uL    NEUTROPHILS 50 32 - 75 %    LYMPHOCYTES 36 12 - 49 %    MONOCYTES 14 (H) 5 - 13 %    EOSINOPHILS 0 0 - 7 %    BASOPHILS 0 0 - 1 %    ABS. NEUTROPHILS 3.3 1.8 - 8.0 K/UL    ABS. LYMPHOCYTES 2.4 0.8 - 3.5 K/UL    ABS. MONOCYTES 1.0 0.0 - 1.0 K/UL    ABS. EOSINOPHILS 0.0 0.0 - 0.4 K/UL    ABS. BASOPHILS 0.0 0.0 - 0.1 K/UL   METABOLIC PANEL, COMPREHENSIVE    Collection Time: 05/22/17  2:46 PM   Result Value Ref Range    Sodium 141 136 - 145 mmol/L    Potassium 3.3 (L) 3.5 - 5.1 mmol/L    Chloride 108 97 - 108 mmol/L    CO2 23 21 - 32 mmol/L    Anion gap 10 5 - 15 mmol/L    Glucose 97 65 - 100 mg/dL    BUN 11 6 - 20 MG/DL    Creatinine 0.91 0.55 - 1.02 MG/DL    BUN/Creatinine ratio 12 12 - 20      GFR est AA >60 >60 ml/min/1.73m2    GFR est non-AA >60 >60 ml/min/1.73m2    Calcium 9.3 8.5 - 10.1 MG/DL    Bilirubin, total 2.3 (H) 0.2 - 1.0 MG/DL    ALT (SGPT) 15 12 - 78 U/L    AST (SGOT) 41 (H) 15 - 37 U/L    Alk.  phosphatase 106 45 - 117 U/L    Protein, total 9.6 (H) 6.4 - 8.2 g/dL    Albumin 4.2 3.5 - 5.0 g/dL    Globulin 5.4 (H) 2.0 - 4.0 g/dL    A-G Ratio 0.8 (L) 1.1 - 2.2     RETICULOCYTE COUNT    Collection Time: 05/22/17  2:46 PM   Result Value Ref Range    Reticulocyte count 4.3 (H) 0.7 - 2.1 %   URINALYSIS W/ REFLEX CULTURE    Collection Time: 05/22/17  2:49 PM   Result Value Ref Range    Color YELLOW/STRAW      Appearance CLEAR CLEAR      Specific gravity 1.011 1.003 - 1.030      pH (UA) 6.5 5.0 - 8.0      Protein NEGATIVE  NEG mg/dL    Glucose NEGATIVE  NEG mg/dL    Ketone NEGATIVE  NEG mg/dL    Bilirubin NEGATIVE  NEG      Blood NEGATIVE  NEG      Urobilinogen 1.0 0.2 - 1.0 EU/dL    Nitrites NEGATIVE  NEG      Leukocyte Esterase NEGATIVE  NEG      WBC 0-4 0 - 4 /hpf    RBC 0-5 0 - 5 /hpf    Epithelial cells FEW FEW /lpf    Bacteria NEGATIVE  NEG /hpf    UA:UC IF INDICATED CULTURE NOT INDICATED BY UA RESULT CNI      Hyaline cast 0-2 0 - 5 /lpf       IMAGING RESULTS:  CXR Results  (Last 48 hours)               05/22/17 1621  XR CHEST PA LAT Final result    Impression:  IMPRESSION: No acute findings. Narrative:  EXAM:  XR CHEST PA LAT       INDICATION:   sickle cell crisis       COMPARISON: April 16, 2007. FINDINGS: PA and lateral radiographs of the chest demonstrate clear lungs and   pleural margins. Cardiac, mediastinal and hilar contours remain normal..  A mild   S-shaped thoracolumbar curve is again noted. Osteonecrosis of the femoral heads   is again shown bilaterally as is loss of height at T2 and adjacent lower   thoracic vertebral segments. MEDICATIONS GIVEN:  Medications   sodium chloride (NS) flush 5-10 mL (not administered)   sodium chloride (NS) flush 5-10 mL (not administered)   HYDROmorphone (PF) (DILAUDID) injection 2 mg (2 mg IntraVENous Given 5/22/17 4725)   sodium chloride 0.9 % bolus infusion 1,000 mL (1,000 mL IntraVENous New Bag 5/22/17 0802)   HYDROmorphone (PF) (DILAUDID) injection 2 mg (2 mg IntraMUSCular Given 5/22/17 1406)   ondansetron (ZOFRAN ODT) tablet 8 mg (8 mg Oral Given 5/22/17 1406)       IMPRESSION:  1. Sickle cell pain crisis [facility])        PLAN: Admit to hospitalist  Admission Note:  4:10 PM  Patient is being admitted to the hospital by Dr. German Steel.   The results of their tests and reasons for their admission have been discussed with them and/or available family. They convey agreement and understanding for the need to be admitted and for their admission diagnosis. Written by Geo Malloy, ED Scribe, as dictated by Emma Ndiaye MD.    Attestation: This note is prepared by Geo Malloy, acting as Scribe for Emma Ndiaye MD.    Emma Ndiaye MD: The scribe's documentation has been prepared under my direction and personally reviewed by me in its entirety. I confirm that the note above accurately reflects all work, treatment, procedures, and medical decision making performed by me.

## 2017-05-22 NOTE — ED NOTES
Complaint of sickle cell crisis with pain in her back since yesterday. Pt has not taken anything for the pain today.

## 2017-05-23 LAB
ALBUMIN SERPL BCP-MCNC: 3.6 G/DL (ref 3.5–5)
ALBUMIN/GLOB SERPL: 0.8 {RATIO} (ref 1.1–2.2)
ALP SERPL-CCNC: 104 U/L (ref 45–117)
ALT SERPL-CCNC: 24 U/L (ref 12–78)
ANION GAP BLD CALC-SCNC: 10 MMOL/L (ref 5–15)
AST SERPL W P-5'-P-CCNC: 61 U/L (ref 15–37)
ATRIAL RATE: 82 BPM
BASOPHILS # BLD AUTO: 0 K/UL
BASOPHILS # BLD AUTO: 0 K/UL
BASOPHILS # BLD: 0 %
BASOPHILS # BLD: 0 %
BILIRUB SERPL-MCNC: 2.8 MG/DL (ref 0.2–1)
BLASTS NFR BLD: 0 %
BUN SERPL-MCNC: 11 MG/DL (ref 6–20)
BUN/CREAT SERPL: 12 (ref 12–20)
CALCIUM SERPL-MCNC: 8.2 MG/DL (ref 8.5–10.1)
CALCULATED P AXIS, ECG09: 69 DEGREES
CALCULATED R AXIS, ECG10: 42 DEGREES
CALCULATED T AXIS, ECG11: 39 DEGREES
CHLORIDE SERPL-SCNC: 107 MMOL/L (ref 97–108)
CO2 SERPL-SCNC: 23 MMOL/L (ref 21–32)
CREAT SERPL-MCNC: 0.93 MG/DL (ref 0.55–1.02)
CRP SERPL-MCNC: <0.29 MG/DL (ref 0–0.6)
DIAGNOSIS, 93000: NORMAL
DIFFERENTIAL METHOD BLD: ABNORMAL
DIFFERENTIAL METHOD BLD: ABNORMAL
EOSINOPHIL # BLD: 0 K/UL
EOSINOPHIL # BLD: 0.2 K/UL
EOSINOPHIL NFR BLD: 0 %
EOSINOPHIL NFR BLD: 2 %
ERYTHROCYTE [DISTWIDTH] IN BLOOD BY AUTOMATED COUNT: 14.4 % (ref 11.5–14.5)
ERYTHROCYTE [DISTWIDTH] IN BLOOD BY AUTOMATED COUNT: 14.7 % (ref 11.5–14.5)
ERYTHROCYTE [SEDIMENTATION RATE] IN BLOOD: 45 MM/HR (ref 0–30)
GLOBULIN SER CALC-MCNC: 4.8 G/DL (ref 2–4)
GLUCOSE SERPL-MCNC: 80 MG/DL (ref 65–100)
HCT VFR BLD AUTO: 21.5 % (ref 35–47)
HCT VFR BLD AUTO: 21.9 % (ref 35–47)
HGB BLD-MCNC: 7.1 G/DL (ref 11.5–16)
HGB BLD-MCNC: 7.6 G/DL (ref 11.5–16)
LYMPHOCYTES # BLD AUTO: 47 %
LYMPHOCYTES # BLD AUTO: 51 %
LYMPHOCYTES # BLD: 4.2 K/UL
LYMPHOCYTES # BLD: 6.4 K/UL
MANUAL DIFFERENTIAL PERFORMED BLD QL: ABNORMAL
MCH RBC QN AUTO: 32 PG (ref 26–34)
MCH RBC QN AUTO: 34.2 PG (ref 26–34)
MCHC RBC AUTO-ENTMCNC: 32.4 G/DL (ref 30–36.5)
MCHC RBC AUTO-ENTMCNC: 35.3 G/DL (ref 30–36.5)
MCV RBC AUTO: 96.8 FL (ref 80–99)
MCV RBC AUTO: 98.6 FL (ref 80–99)
METAMYELOCYTES NFR BLD MANUAL: 0 %
MONOCYTES # BLD: 0.5 K/UL
MONOCYTES # BLD: 1.1 K/UL
MONOCYTES NFR BLD AUTO: 12 %
MONOCYTES NFR BLD AUTO: 4 %
MYELOCYTES NFR BLD MANUAL: 0 %
NEUTS BAND NFR BLD MANUAL: 0 %
NEUTS SEG # BLD: 3.6 K/UL
NEUTS SEG # BLD: 5.7 K/UL
NEUTS SEG NFR BLD AUTO: 39 %
NEUTS SEG NFR BLD AUTO: 45 %
NRBC # BLD: 0.09 K/UL (ref 0–0.01)
NRBC BLD-RTO: 1 PER 100 WBC
P-R INTERVAL, ECG05: 160 MS
PLATELET # BLD AUTO: 200 K/UL (ref 150–400)
PLATELET # BLD AUTO: 212 K/UL (ref 150–400)
POTASSIUM SERPL-SCNC: 3.5 MMOL/L (ref 3.5–5.1)
PROMYELOCYTES NFR BLD MANUAL: 0 %
PROT SERPL-MCNC: 8.4 G/DL (ref 6.4–8.2)
Q-T INTERVAL, ECG07: 400 MS
QRS DURATION, ECG06: 84 MS
QTC CALCULATION (BEZET), ECG08: 467 MS
RBC # BLD AUTO: 2.22 M/UL (ref 3.8–5.2)
RBC # BLD AUTO: 2.22 M/UL (ref 3.8–5.2)
RBC MORPH BLD: ABNORMAL
RETICS/RBC NFR AUTO: 4.5 % (ref 0.7–2.1)
SODIUM SERPL-SCNC: 140 MMOL/L (ref 136–145)
VENTRICULAR RATE, ECG03: 82 BPM
WBC # BLD AUTO: 12.6 K/UL (ref 3.6–11)
WBC # BLD AUTO: 9.1 K/UL (ref 3.6–11)
WBC NRBC COR # BLD: ABNORMAL 10*3/UL
WBC OTHER # BLD MANUAL: 0 10*3/UL

## 2017-05-23 PROCEDURE — 74011250636 HC RX REV CODE- 250/636: Performed by: INTERNAL MEDICINE

## 2017-05-23 PROCEDURE — 85045 AUTOMATED RETICULOCYTE COUNT: CPT | Performed by: INTERNAL MEDICINE

## 2017-05-23 PROCEDURE — 74011250637 HC RX REV CODE- 250/637: Performed by: INTERNAL MEDICINE

## 2017-05-23 PROCEDURE — 86140 C-REACTIVE PROTEIN: CPT | Performed by: INTERNAL MEDICINE

## 2017-05-23 PROCEDURE — 85027 COMPLETE CBC AUTOMATED: CPT | Performed by: INTERNAL MEDICINE

## 2017-05-23 PROCEDURE — 85025 COMPLETE CBC W/AUTO DIFF WBC: CPT | Performed by: INTERNAL MEDICINE

## 2017-05-23 PROCEDURE — 94762 N-INVAS EAR/PLS OXIMTRY CONT: CPT

## 2017-05-23 PROCEDURE — 85652 RBC SED RATE AUTOMATED: CPT | Performed by: INTERNAL MEDICINE

## 2017-05-23 PROCEDURE — 77010033678 HC OXYGEN DAILY

## 2017-05-23 PROCEDURE — 36415 COLL VENOUS BLD VENIPUNCTURE: CPT | Performed by: INTERNAL MEDICINE

## 2017-05-23 PROCEDURE — 80053 COMPREHEN METABOLIC PANEL: CPT | Performed by: INTERNAL MEDICINE

## 2017-05-23 PROCEDURE — 65660000000 HC RM CCU STEPDOWN

## 2017-05-23 RX ORDER — DIPHENHYDRAMINE HYDROCHLORIDE 50 MG/ML
50 INJECTION, SOLUTION INTRAMUSCULAR; INTRAVENOUS
Status: DISCONTINUED | OUTPATIENT
Start: 2017-05-23 | End: 2017-05-26

## 2017-05-23 RX ORDER — FAMOTIDINE 20 MG/1
20 TABLET, FILM COATED ORAL 2 TIMES DAILY
Status: DISCONTINUED | OUTPATIENT
Start: 2017-05-23 | End: 2017-06-01 | Stop reason: HOSPADM

## 2017-05-23 RX ADMIN — FAMOTIDINE 20 MG: 20 TABLET ORAL at 18:07

## 2017-05-23 RX ADMIN — ONDANSETRON HYDROCHLORIDE 4 MG: 2 INJECTION, SOLUTION INTRAMUSCULAR; INTRAVENOUS at 03:02

## 2017-05-23 RX ADMIN — HYDROMORPHONE HYDROCHLORIDE 2 MG: 2 INJECTION, SOLUTION INTRAMUSCULAR; INTRAVENOUS; SUBCUTANEOUS at 20:56

## 2017-05-23 RX ADMIN — VALSARTAN 80 MG: 80 TABLET ORAL at 08:54

## 2017-05-23 RX ADMIN — ONDANSETRON HYDROCHLORIDE 4 MG: 2 INJECTION, SOLUTION INTRAMUSCULAR; INTRAVENOUS at 18:01

## 2017-05-23 RX ADMIN — HYDROMORPHONE HYDROCHLORIDE 2 MG: 2 INJECTION, SOLUTION INTRAMUSCULAR; INTRAVENOUS; SUBCUTANEOUS at 05:57

## 2017-05-23 RX ADMIN — DIPHENHYDRAMINE HYDROCHLORIDE 50 MG: 50 INJECTION, SOLUTION INTRAMUSCULAR; INTRAVENOUS at 09:16

## 2017-05-23 RX ADMIN — Medication 10 ML: at 13:49

## 2017-05-23 RX ADMIN — FOLIC ACID 1 MG: 1 TABLET ORAL at 08:54

## 2017-05-23 RX ADMIN — HYDROMORPHONE HYDROCHLORIDE 2 MG: 2 INJECTION, SOLUTION INTRAMUSCULAR; INTRAVENOUS; SUBCUTANEOUS at 12:01

## 2017-05-23 RX ADMIN — ENOXAPARIN SODIUM 40 MG: 40 INJECTION SUBCUTANEOUS at 21:00

## 2017-05-23 RX ADMIN — HYDROMORPHONE HYDROCHLORIDE 2 MG: 2 INJECTION, SOLUTION INTRAMUSCULAR; INTRAVENOUS; SUBCUTANEOUS at 03:01

## 2017-05-23 RX ADMIN — ONDANSETRON HYDROCHLORIDE 4 MG: 2 INJECTION, SOLUTION INTRAMUSCULAR; INTRAVENOUS at 22:05

## 2017-05-23 RX ADMIN — HYDROMORPHONE HYDROCHLORIDE 2 MG: 2 INJECTION, SOLUTION INTRAMUSCULAR; INTRAVENOUS; SUBCUTANEOUS at 15:03

## 2017-05-23 RX ADMIN — HYDROMORPHONE HYDROCHLORIDE 2 MG: 2 INJECTION, SOLUTION INTRAMUSCULAR; INTRAVENOUS; SUBCUTANEOUS at 17:58

## 2017-05-23 RX ADMIN — DIPHENHYDRAMINE HYDROCHLORIDE 50 MG: 50 INJECTION, SOLUTION INTRAMUSCULAR; INTRAVENOUS at 22:05

## 2017-05-23 RX ADMIN — DIPHENHYDRAMINE HYDROCHLORIDE 50 MG: 50 INJECTION, SOLUTION INTRAMUSCULAR; INTRAVENOUS at 18:04

## 2017-05-23 RX ADMIN — SODIUM CHLORIDE 150 ML/HR: 900 INJECTION, SOLUTION INTRAVENOUS at 03:01

## 2017-05-23 RX ADMIN — FAMOTIDINE 20 MG: 20 TABLET ORAL at 08:54

## 2017-05-23 RX ADMIN — ONDANSETRON HYDROCHLORIDE 4 MG: 2 INJECTION, SOLUTION INTRAMUSCULAR; INTRAVENOUS at 14:16

## 2017-05-23 RX ADMIN — DEXTROSE MONOHYDRATE, SODIUM CHLORIDE, AND POTASSIUM CHLORIDE: 50; 4.5; 1.49 INJECTION, SOLUTION INTRAVENOUS at 18:10

## 2017-05-23 RX ADMIN — DIPHENHYDRAMINE HYDROCHLORIDE 50 MG: 50 INJECTION, SOLUTION INTRAMUSCULAR; INTRAVENOUS at 14:18

## 2017-05-23 RX ADMIN — HYDROMORPHONE HYDROCHLORIDE 2 MG: 2 INJECTION, SOLUTION INTRAMUSCULAR; INTRAVENOUS; SUBCUTANEOUS at 23:58

## 2017-05-23 RX ADMIN — DEXTROSE MONOHYDRATE, SODIUM CHLORIDE, AND POTASSIUM CHLORIDE: 50; 4.5; 1.49 INJECTION, SOLUTION INTRAVENOUS at 09:25

## 2017-05-23 RX ADMIN — DIPHENHYDRAMINE HYDROCHLORIDE 25 MG: 25 CAPSULE ORAL at 03:02

## 2017-05-23 RX ADMIN — ONDANSETRON HYDROCHLORIDE 4 MG: 2 INJECTION, SOLUTION INTRAMUSCULAR; INTRAVENOUS at 09:20

## 2017-05-23 RX ADMIN — Medication 10 ML: at 13:51

## 2017-05-23 RX ADMIN — Medication 10 ML: at 21:02

## 2017-05-23 RX ADMIN — HYDROMORPHONE HYDROCHLORIDE 2 MG: 2 INJECTION, SOLUTION INTRAMUSCULAR; INTRAVENOUS; SUBCUTANEOUS at 08:54

## 2017-05-23 NOTE — PROGRESS NOTES
General Daily Progress Note    Admit Date: 5/22/2017    Subjective:     Patient has no complaint. Current Facility-Administered Medications   Medication Dose Route Frequency    dextrose 5 % - 0.45% NaCl 1,000 mL with potassium chloride 20 mEq infusion   IntraVENous CONTINUOUS    diphenhydrAMINE (BENADRYL) injection 50 mg  50 mg IntraVENous Q4H PRN    famotidine (PEPCID) tablet 20 mg  20 mg Oral BID    sodium chloride (NS) flush 5-10 mL  5-10 mL IntraVENous Q8H    sodium chloride (NS) flush 5-10 mL  5-10 mL IntraVENous PRN    folic acid (FOLVITE) tablet 1 mg  1 mg Oral DAILY    nitroglycerin (NITROBID) 2 % ointment 1 Inch  1 Inch Topical Q6H PRN    sodium chloride (NS) flush 5-10 mL  5-10 mL IntraVENous Q8H    sodium chloride (NS) flush 5-10 mL  5-10 mL IntraVENous PRN    acetaminophen (TYLENOL) tablet 650 mg  650 mg Oral Q6H PRN    ondansetron (ZOFRAN) injection 4 mg  4 mg IntraVENous Q4H PRN    enoxaparin (LOVENOX) injection 40 mg  40 mg SubCUTAneous Q24H    valsartan (DIOVAN) tablet 80 mg  80 mg Oral DAILY    HYDROmorphone (PF) (DILAUDID) injection 2 mg  2 mg IntraVENous Q3H PRN        Review of Systems  A comprehensive review of systems was negative.     Objective:     Patient Vitals for the past 24 hrs:   BP Temp Pulse Resp SpO2 Height Weight   05/23/17 0753 127/72 98.1 °F (36.7 °C) 97 20 99 % - -   05/23/17 0303 128/74 99.7 °F (37.6 °C) 96 18 99 % - -   05/23/17 0000 132/79 99.5 °F (37.5 °C) 91 18 99 % - -   05/22/17 2000 160/86 99.1 °F (37.3 °C) 93 16 99 % - -   05/22/17 1807 147/86 - 85 16 100 % - -   05/22/17 1206 (!) 166/92 98.5 °F (36.9 °C) (!) 104 18 100 % 5' 10\" (1.778 m) 189 lb 13.1 oz (86.1 kg)             Physical Exam:   Visit Vitals    /72    Pulse 97    Temp 98.1 °F (36.7 °C)    Resp 20    Ht 5' 10\" (1.778 m)    Wt 189 lb 13.1 oz (86.1 kg)    SpO2 99%    BMI 27.24 kg/m2     General appearance: alert, cooperative, no distress, appears stated age  Neck: supple, symmetrical, trachea midline, no adenopathy, thyroid: not enlarged, symmetric, no tenderness/mass/nodules, no carotid bruit and no JVD  Lungs: clear to auscultation bilaterally  Heart: regular rate and rhythm, S1, S2 normal, no murmur, click, rub or gallop  Abdomen: soft, non-tender. Bowel sounds normal. No masses,  no organomegaly  Extremities: extremities normal, atraumatic, no cyanosis or edema  Neurologic: Grossly normal        Data Review   Recent Results (from the past 24 hour(s))   CBC WITH AUTOMATED DIFF    Collection Time: 05/22/17  2:46 PM   Result Value Ref Range    WBC 6.8 3.6 - 11.0 K/uL    RBC 2.69 (L) 3.80 - 5.20 M/uL    HGB 8.8 (L) 11.5 - 16.0 g/dL    HCT 25.8 (L) 35.0 - 47.0 %    MCV 95.9 80.0 - 99.0 FL    MCH 32.7 26.0 - 34.0 PG    MCHC 34.1 30.0 - 36.5 g/dL    RDW 14.4 11.5 - 14.5 %    PLATELET 205 678 - 628 K/uL    NEUTROPHILS 50 32 - 75 %    LYMPHOCYTES 36 12 - 49 %    MONOCYTES 14 (H) 5 - 13 %    EOSINOPHILS 0 0 - 7 %    BASOPHILS 0 0 - 1 %    ABS. NEUTROPHILS 3.3 1.8 - 8.0 K/UL    ABS. LYMPHOCYTES 2.4 0.8 - 3.5 K/UL    ABS. MONOCYTES 1.0 0.0 - 1.0 K/UL    ABS. EOSINOPHILS 0.0 0.0 - 0.4 K/UL    ABS. BASOPHILS 0.0 0.0 - 0.1 K/UL   METABOLIC PANEL, COMPREHENSIVE    Collection Time: 05/22/17  2:46 PM   Result Value Ref Range    Sodium 141 136 - 145 mmol/L    Potassium 3.3 (L) 3.5 - 5.1 mmol/L    Chloride 108 97 - 108 mmol/L    CO2 23 21 - 32 mmol/L    Anion gap 10 5 - 15 mmol/L    Glucose 97 65 - 100 mg/dL    BUN 11 6 - 20 MG/DL    Creatinine 0.91 0.55 - 1.02 MG/DL    BUN/Creatinine ratio 12 12 - 20      GFR est AA >60 >60 ml/min/1.73m2    GFR est non-AA >60 >60 ml/min/1.73m2    Calcium 9.3 8.5 - 10.1 MG/DL    Bilirubin, total 2.3 (H) 0.2 - 1.0 MG/DL    ALT (SGPT) 15 12 - 78 U/L    AST (SGOT) 41 (H) 15 - 37 U/L    Alk.  phosphatase 106 45 - 117 U/L    Protein, total 9.6 (H) 6.4 - 8.2 g/dL    Albumin 4.2 3.5 - 5.0 g/dL    Globulin 5.4 (H) 2.0 - 4.0 g/dL    A-G Ratio 0.8 (L) 1.1 - 2.2 RETICULOCYTE COUNT    Collection Time: 05/22/17  2:46 PM   Result Value Ref Range    Reticulocyte count 4.3 (H) 0.7 - 2.1 %   URINALYSIS W/ REFLEX CULTURE    Collection Time: 05/22/17  2:49 PM   Result Value Ref Range    Color YELLOW/STRAW      Appearance CLEAR CLEAR      Specific gravity 1.011 1.003 - 1.030      pH (UA) 6.5 5.0 - 8.0      Protein NEGATIVE  NEG mg/dL    Glucose NEGATIVE  NEG mg/dL    Ketone NEGATIVE  NEG mg/dL    Bilirubin NEGATIVE  NEG      Blood NEGATIVE  NEG      Urobilinogen 1.0 0.2 - 1.0 EU/dL    Nitrites NEGATIVE  NEG      Leukocyte Esterase NEGATIVE  NEG      WBC 0-4 0 - 4 /hpf    RBC 0-5 0 - 5 /hpf    Epithelial cells FEW FEW /lpf    Bacteria NEGATIVE  NEG /hpf    UA:UC IF INDICATED CULTURE NOT INDICATED BY UA RESULT CNI      Hyaline cast 0-2 0 - 5 /lpf   EKG, 12 LEAD, INITIAL    Collection Time: 05/22/17  4:38 PM   Result Value Ref Range    Ventricular Rate 82 BPM    Atrial Rate 82 BPM    P-R Interval 160 ms    QRS Duration 84 ms    Q-T Interval 400 ms    QTC Calculation (Bezet) 467 ms    Calculated P Axis 69 degrees    Calculated R Axis 42 degrees    Calculated T Axis 39 degrees    Diagnosis       Normal sinus rhythm  Normal ECG  No previous ECGs available     SED RATE (ESR)    Collection Time: 05/23/17  3:09 AM   Result Value Ref Range    Sed rate, automated 45 (H) 0 - 30 mm/hr   C REACTIVE PROTEIN, QT    Collection Time: 05/23/17  3:09 AM   Result Value Ref Range    C-Reactive protein <0.29 0.00 - 0.60 mg/dL   RETICULOCYTE COUNT    Collection Time: 05/23/17  3:09 AM   Result Value Ref Range    Reticulocyte count 4.5 (H) 0.7 - 2.1 %   METABOLIC PANEL, COMPREHENSIVE    Collection Time: 05/23/17  3:09 AM   Result Value Ref Range    Sodium 140 136 - 145 mmol/L    Potassium 3.5 3.5 - 5.1 mmol/L    Chloride 107 97 - 108 mmol/L    CO2 23 21 - 32 mmol/L    Anion gap 10 5 - 15 mmol/L    Glucose 80 65 - 100 mg/dL    BUN 11 6 - 20 MG/DL    Creatinine 0.93 0.55 - 1.02 MG/DL    BUN/Creatinine ratio 12 12 - 20      GFR est AA >60 >60 ml/min/1.73m2    GFR est non-AA >60 >60 ml/min/1.73m2    Calcium 8.2 (L) 8.5 - 10.1 MG/DL    Bilirubin, total 2.8 (H) 0.2 - 1.0 MG/DL    ALT (SGPT) 24 12 - 78 U/L    AST (SGOT) 61 (H) 15 - 37 U/L    Alk. phosphatase 104 45 - 117 U/L    Protein, total 8.4 (H) 6.4 - 8.2 g/dL    Albumin 3.6 3.5 - 5.0 g/dL    Globulin 4.8 (H) 2.0 - 4.0 g/dL    A-G Ratio 0.8 (L) 1.1 - 2.2     CBC WITH AUTOMATED DIFF    Collection Time: 05/23/17  3:09 AM   Result Value Ref Range    WBC 12.6 (H) 3.6 - 11.0 K/uL    RBC 2.22 (L) 3.80 - 5.20 M/uL    HGB 7.6 (L) 11.5 - 16.0 g/dL    HCT 21.5 (L) 35.0 - 47.0 %    MCV 96.8 80.0 - 99.0 FL    MCH 34.2 (H) 26.0 - 34.0 PG    MCHC 35.3 30.0 - 36.5 g/dL    RDW 14.4 11.5 - 14.5 %    PLATELET 199 580 - 215 K/uL    NEUTROPHILS 45 %    LYMPHOCYTES 51 %    MONOCYTES 4 %    EOSINOPHILS 0 %    BASOPHILS 0 %    ABS. NEUTROPHILS 5.7 K/UL    ABS. LYMPHOCYTES 6.4 K/UL    ABS. MONOCYTES 0.5 K/UL    ABS. EOSINOPHILS 0.0 K/UL    ABS. BASOPHILS 0.0 K/UL    RBC COMMENTS NORMOCYTIC, NORMOCHROMIC      RBC COMMENTS TARGET CELLS  PRESENT        DF MANUAL             Assessment:     Active Problems:    Hypertension (10/3/2014)      Hepatitis C (10/3/2014)      Sickle cell crisis ([facility] [address] (5/22/2017)        Plan: 1. Cont treatment of painful crisis from [place] hemoglobinopathy.

## 2017-05-23 NOTE — PROGRESS NOTES
Pt is a 49 y.o  [place] female admitted with Sickle Cell Crisis. Pt was alert, oriented and in no distress. Demographic information verified and all is correct. Pt's daughter lives with her in a 1 story home with a couple of steps to the entrance. Prior to admission, pt was independent with her ADL's and IADL's and drives. Denies using DME or having home health or rehab in the past. Preferred pharmacy is [address] [place] [address] in [place]. Pt visits her PCP as needed. Family can transport her home at discharge. CM will continue to follow pt for discharge planning needs. Care Management Interventions  PCP Verified by CM: Yes (Dr. Draien John)  Mode of Transport at Discharge: Other (see comment) (pt's family can transport by car)  Transition of Care Consult (CM Consult): Discharge Planning  Discharge Durable Medical Equipment: No  Physical Therapy Consult: No  Occupational Therapy Consult: No  Speech Therapy Consult: No  Current Support Network:  Other, Own Home (pt's daughter lives with pt in a 1 story home with a couple steps to the entrance)  Confirm Follow Up Transport: Family  Discharge Location  Discharge Placement: [address], [address] [facility]

## 2017-05-23 NOTE — PROGRESS NOTES
* No surgery found *  Bedside and Verbal shift change report given to Garett Simpson (oncoming nurse) by Christa Rolon (offgoing nurse). Report included the following information SBAR, Kardex, ED Summary, Intake/Output, MAR and Cardiac Rhythm NSR. Zone Phone:   [phone]      Significant changes during shift:  Prn dilaudid given every 3 hours as needed. Patient Information    Yenifer Barragan  49 y.o.  5/22/2017  1:01 PM by Baldev Okeefe MD. Yenifer Barragan was admitted from Home    Problem List    Patient Active Problem List    Diagnosis Date Noted    Sickle cell crisis ([facility] [address] 05/22/2017    Orbital fracture ([facility] [address] 11/20/2016    Furuncle of axilla 07/21/2016    Depression 10/05/2014    Sickle cell anemia ([facility] [address] 10/03/2014    Hypertension 10/03/2014    Venous insufficiency 10/03/2014    Hepatitis C 10/03/2014     Past Medical History:   Diagnosis Date    Anemia     SC hemoglobinopathy    Chronic pain     Depression     Hypertension     Liver disease     hepatitis C         Core Measures:    CVA: No No  CHF:No No  PNA:No No        Activity Status:    OOB to Chair Yes  Ambulated this shift Yes   Bed Rest No    Supplemental O2: (If Applicable)    NC Yes  NRB No  Venti-mask No  On 1 Liters/min      LINES AND DRAINS:    15Q Left basilic- NS @487    DVT prophylaxis:    DVT prophylaxis Med- Yes  DVT prophylaxis SCD or SARA- No     Wounds: (If Applicable)    Wounds- No    Location     Patient Safety:    Falls Score Total Score: 1  Safety Level_______  Bed Alarm On? Refused  Sitter? No    Plan for upcoming shift: monitor blood levels, pain management.          Discharge Plan: No     Active Consults:  None

## 2017-05-24 PROCEDURE — 77010033678 HC OXYGEN DAILY

## 2017-05-24 PROCEDURE — 65660000000 HC RM CCU STEPDOWN

## 2017-05-24 PROCEDURE — 74011250637 HC RX REV CODE- 250/637: Performed by: INTERNAL MEDICINE

## 2017-05-24 PROCEDURE — 74011250636 HC RX REV CODE- 250/636: Performed by: INTERNAL MEDICINE

## 2017-05-24 RX ADMIN — DIPHENHYDRAMINE HYDROCHLORIDE 50 MG: 50 INJECTION, SOLUTION INTRAMUSCULAR; INTRAVENOUS at 06:22

## 2017-05-24 RX ADMIN — ONDANSETRON HYDROCHLORIDE 4 MG: 2 INJECTION, SOLUTION INTRAMUSCULAR; INTRAVENOUS at 06:22

## 2017-05-24 RX ADMIN — Medication 10 ML: at 06:24

## 2017-05-24 RX ADMIN — HYDROMORPHONE HYDROCHLORIDE 2 MG: 2 INJECTION, SOLUTION INTRAMUSCULAR; INTRAVENOUS; SUBCUTANEOUS at 06:26

## 2017-05-24 RX ADMIN — Medication 10 ML: at 21:21

## 2017-05-24 RX ADMIN — DIPHENHYDRAMINE HYDROCHLORIDE 50 MG: 50 INJECTION, SOLUTION INTRAMUSCULAR; INTRAVENOUS at 02:10

## 2017-05-24 RX ADMIN — HYDROMORPHONE HYDROCHLORIDE 2 MG: 2 INJECTION, SOLUTION INTRAMUSCULAR; INTRAVENOUS; SUBCUTANEOUS at 16:33

## 2017-05-24 RX ADMIN — ONDANSETRON HYDROCHLORIDE 4 MG: 2 INJECTION, SOLUTION INTRAMUSCULAR; INTRAVENOUS at 23:58

## 2017-05-24 RX ADMIN — ONDANSETRON HYDROCHLORIDE 4 MG: 2 INJECTION, SOLUTION INTRAMUSCULAR; INTRAVENOUS at 19:45

## 2017-05-24 RX ADMIN — Medication 10 ML: at 13:16

## 2017-05-24 RX ADMIN — DIPHENHYDRAMINE HYDROCHLORIDE 50 MG: 50 INJECTION, SOLUTION INTRAMUSCULAR; INTRAVENOUS at 15:12

## 2017-05-24 RX ADMIN — ONDANSETRON HYDROCHLORIDE 4 MG: 2 INJECTION, SOLUTION INTRAMUSCULAR; INTRAVENOUS at 09:44

## 2017-05-24 RX ADMIN — DIPHENHYDRAMINE HYDROCHLORIDE 50 MG: 50 INJECTION, SOLUTION INTRAMUSCULAR; INTRAVENOUS at 09:44

## 2017-05-24 RX ADMIN — FOLIC ACID 1 MG: 1 TABLET ORAL at 09:44

## 2017-05-24 RX ADMIN — ONDANSETRON HYDROCHLORIDE 4 MG: 2 INJECTION, SOLUTION INTRAMUSCULAR; INTRAVENOUS at 02:10

## 2017-05-24 RX ADMIN — FAMOTIDINE 20 MG: 20 TABLET ORAL at 17:18

## 2017-05-24 RX ADMIN — DEXTROSE MONOHYDRATE, SODIUM CHLORIDE, AND POTASSIUM CHLORIDE: 50; 4.5; 1.49 INJECTION, SOLUTION INTRAVENOUS at 15:45

## 2017-05-24 RX ADMIN — DIPHENHYDRAMINE HYDROCHLORIDE 50 MG: 50 INJECTION, SOLUTION INTRAMUSCULAR; INTRAVENOUS at 19:45

## 2017-05-24 RX ADMIN — HYDROMORPHONE HYDROCHLORIDE 2 MG: 2 INJECTION, SOLUTION INTRAMUSCULAR; INTRAVENOUS; SUBCUTANEOUS at 19:42

## 2017-05-24 RX ADMIN — HYDROMORPHONE HYDROCHLORIDE 2 MG: 2 INJECTION, SOLUTION INTRAMUSCULAR; INTRAVENOUS; SUBCUTANEOUS at 13:16

## 2017-05-24 RX ADMIN — HYDROMORPHONE HYDROCHLORIDE 2 MG: 2 INJECTION, SOLUTION INTRAMUSCULAR; INTRAVENOUS; SUBCUTANEOUS at 03:20

## 2017-05-24 RX ADMIN — HYDROMORPHONE HYDROCHLORIDE 2 MG: 2 INJECTION, SOLUTION INTRAMUSCULAR; INTRAVENOUS; SUBCUTANEOUS at 23:09

## 2017-05-24 RX ADMIN — FAMOTIDINE 20 MG: 20 TABLET ORAL at 09:44

## 2017-05-24 RX ADMIN — ENOXAPARIN SODIUM 40 MG: 40 INJECTION SUBCUTANEOUS at 21:20

## 2017-05-24 RX ADMIN — Medication 10 ML: at 23:59

## 2017-05-24 RX ADMIN — ONDANSETRON HYDROCHLORIDE 4 MG: 2 INJECTION, SOLUTION INTRAMUSCULAR; INTRAVENOUS at 15:12

## 2017-05-24 RX ADMIN — DIPHENHYDRAMINE HYDROCHLORIDE 50 MG: 50 INJECTION, SOLUTION INTRAMUSCULAR; INTRAVENOUS at 23:58

## 2017-05-24 RX ADMIN — VALSARTAN 80 MG: 80 TABLET ORAL at 09:44

## 2017-05-24 RX ADMIN — Medication 10 ML: at 15:12

## 2017-05-24 RX ADMIN — DEXTROSE MONOHYDRATE, SODIUM CHLORIDE, AND POTASSIUM CHLORIDE: 50; 4.5; 1.49 INJECTION, SOLUTION INTRAVENOUS at 02:00

## 2017-05-24 RX ADMIN — HYDROMORPHONE HYDROCHLORIDE 2 MG: 2 INJECTION, SOLUTION INTRAMUSCULAR; INTRAVENOUS; SUBCUTANEOUS at 09:44

## 2017-05-24 NOTE — PROGRESS NOTES
General Daily Progress Note    Admit Date: 5/22/2017    Subjective:     Patient c/o pain but better. Current Facility-Administered Medications   Medication Dose Route Frequency    dextrose 5% - 0.45% NaCl with KCl 20 mEq/L 1,000 mL infusion   IntraVENous CONTINUOUS    diphenhydrAMINE (BENADRYL) injection 50 mg  50 mg IntraVENous Q4H PRN    famotidine (PEPCID) tablet 20 mg  20 mg Oral BID    sodium chloride (NS) flush 5-10 mL  5-10 mL IntraVENous Q8H    sodium chloride (NS) flush 5-10 mL  5-10 mL IntraVENous PRN    folic acid (FOLVITE) tablet 1 mg  1 mg Oral DAILY    nitroglycerin (NITROBID) 2 % ointment 1 Inch  1 Inch Topical Q6H PRN    sodium chloride (NS) flush 5-10 mL  5-10 mL IntraVENous Q8H    sodium chloride (NS) flush 5-10 mL  5-10 mL IntraVENous PRN    acetaminophen (TYLENOL) tablet 650 mg  650 mg Oral Q6H PRN    ondansetron (ZOFRAN) injection 4 mg  4 mg IntraVENous Q4H PRN    enoxaparin (LOVENOX) injection 40 mg  40 mg SubCUTAneous Q24H    valsartan (DIOVAN) tablet 80 mg  80 mg Oral DAILY    HYDROmorphone (PF) (DILAUDID) injection 2 mg  2 mg IntraVENous Q3H PRN        Review of Systems  A comprehensive review of systems was negative.     Objective:     Patient Vitals for the past 24 hrs:   BP Temp Pulse Resp SpO2   05/23/17 2355 117/75 99.1 °F (37.3 °C) 93 20 95 %   05/23/17 2046 (!) 84/55 99.2 °F (37.3 °C) 91 20 96 %   05/23/17 1509 98/63 98.9 °F (37.2 °C) 86 20 97 %   05/23/17 1131 107/55 99.4 °F (37.4 °C) 78 20 98 %   05/23/17 1122 - - 80 - 97 %   05/23/17 0753 127/72 98.1 °F (36.7 °C) 97 20 99 %             Physical Exam:   Visit Vitals    /75    Pulse 93    Temp 99.1 °F (37.3 °C)    Resp 20    Ht 5' 10\" (1.778 m)    Wt 189 lb 13.1 oz (86.1 kg)    SpO2 95%    BMI 27.24 kg/m2     General appearance: alert, cooperative, no distress, appears stated age  Neck: supple, symmetrical, trachea midline, no adenopathy, thyroid: not enlarged, symmetric, no tenderness/mass/nodules, no carotid bruit and no JVD  Lungs: clear to auscultation bilaterally  Heart: regular rate and rhythm, S1, S2 normal, no murmur, click, rub or gallop  Abdomen: soft, non-tender. Bowel sounds normal. No masses,  no organomegaly  Extremities: extremities normal, atraumatic, no cyanosis or edema        Data Review   Recent Results (from the past 24 hour(s))   CBC WITH MANUAL DIFF    Collection Time: 05/23/17 10:40 AM   Result Value Ref Range    WBC 9.1 3.6 - 11.0 K/uL    RBC 2.22 (L) 3.80 - 5.20 M/uL    HGB 7.1 (L) 11.5 - 16.0 g/dL    HCT 21.9 (L) 35.0 - 47.0 %    MCV 98.6 80.0 - 99.0 FL    MCH 32.0 26.0 - 34.0 PG    MCHC 32.4 30.0 - 36.5 g/dL    RDW 14.7 (H) 11.5 - 14.5 %    PLATELET 393 532 - 633 K/uL    NEUTROPHILS 39 %    BAND NEUTROPHILS 0 %    LYMPHOCYTES 47 %    MONOCYTES 12 %    EOSINOPHILS 2 %    BASOPHILS 0 %    METAMYELOCYTES 0 %    MYELOCYTES 0 %    PROMYELOCYTES 0 %    BLASTS 0 %    OTHER CELL 0      ABS. NEUTROPHILS 3.6 K/UL    ABS. LYMPHOCYTES 4.2 K/UL    ABS. MONOCYTES 1.1 K/UL    ABS. EOSINOPHILS 0.2 K/UL    ABS. BASOPHILS 0.0 K/UL    RBC COMMENTS NORMOCYTIC, NORMOCHROMIC      RBC COMMENTS SICKLE CELLS  PRESENT        DF MANUAL      NRBC 1.0 (H) 0  WBC    ABSOLUTE NRBC 0.09 (H) 0.00 - 0.01 K/uL    WBC CORRECTED FOR NR ADJUSTED FOR NUCLEATED RBC'S      DIFFERENTIAL MANUAL DIFFERENTIAL ORDERED             Assessment:     Active Problems:    Hypertension (10/3/2014)      Hepatitis C (10/3/2014)      Sickle cell crisis ([facility] [address] (5/22/2017)        Plan: 1. Cont analgesic for pain control along with hydration. 2.Conts with low grade temp elevation. WBC decreasing. Cont passive vigilance. 3.Will mobilize.

## 2017-05-24 NOTE — PROGRESS NOTES
Bedside and Verbal shift change report given to Jackie (oncoming nurse) by Latasha Nelson (offgoing nurse). Report included the following information SBAR, Kardex, ED Summary, Intake/Output, MAR and Cardiac Rhythm NSR.     Zone Phone: [phone]          Significant changes during shift: Prn dilaudid given every 3 hours as needed. PRN benadryl and zofran every 4 hours as needed.               Patient Information      bE Luciano  49 y.o.  2017 1:01 PM by Kwabena Cheng MD. Eb Luciano was admitted from Home      Problem List              Patient Active Problem List      Diagnosis Date Noted    Sickle cell crisis ([facility] [address] 2017    Orbital fracture ([facility] [address] 2016    Furuncle of axilla 2016    Depression 10/05/2014    Sickle cell anemia ([facility] [address] 10/03/2014    Hypertension 10/03/2014    Venous insufficiency 10/03/2014    Hepatitis C 10/03/2014               Past Medical History:   Diagnosis Date    Anemia         SC hemoglobinopathy    Chronic pain       Depression       Hypertension       Liver disease         hepatitis C               Core Measures:      CVA: No No  CHF:No No  PNA:No No              Activity Status:      OOB to Chair Yes  Ambulated this shift Yes   Bed Rest No      Supplemental O2: (If Applicable)      NC Yes  NRB No  Venti-mask No  On 1 Liters/min          LINES AND DRAINS:      28J Left basilic- NS @627      DVT prophylaxis:      DVT prophylaxis Med- Yes  DVT prophylaxis SCD or SARA- No       Wounds: (If Applicable)      Wounds- No      Location       Patient Safety:      Falls Score Total Score: 1  Safety Level_______  Bed Alarm On? Refused  Sitter?  No      Plan for upcoming shift: pain management.               Discharge Plan: No       Active Consults:  None

## 2017-05-24 NOTE — ROUTINE PROCESS
* No surgery found *  Bedside and Verbal shift change report given to Mirna (oncoming nurse) by Shae(offgoing nurse). Report included the following information SBAR, Kardex, ED Summary, Intake/Output, MAR and Cardiac Rhythm NSR.     Zone Phone: [phone]        Significant changes during shift: Prn dilaudid given every 3 hours as needed. PRN benadryl and zofran every 4 hours as needed.            Patient Information     Divya Oshea  49 y.o.  5/22/2017 1:01 PM by Clarence Rios MD. Divya Oshea was admitted from Home     Problem List          Patient Active Problem List     Diagnosis Date Noted    Sickle cell crisis ([facility] [address] 05/22/2017    Orbital fracture ([facility] [address] 11/20/2016    Furuncle of axilla 07/21/2016    Depression 10/05/2014    Sickle cell anemia ([facility] [address] 10/03/2014    Hypertension 10/03/2014    Venous insufficiency 10/03/2014    Hepatitis C 10/03/2014           Past Medical History:   Diagnosis Date    Anemia       SC hemoglobinopathy    Chronic pain      Depression      Hypertension      Liver disease       hepatitis C            Core Measures:     CVA: No No  CHF:No No  PNA:No No           Activity Status:     OOB to Chair Yes  Ambulated this shift Yes   Bed Rest No     Supplemental O2: (If Applicable)     NC Yes  NRB No  Venti-mask No  On 1 Liters/min        LINES AND DRAINS:     85O Left basilic- NS @817     DVT prophylaxis:     DVT prophylaxis Med- Yes  DVT prophylaxis SCD or SARA- No      Wounds: (If Applicable)     Wounds- No     Location      Patient Safety:     Falls Score Total Score: 1  Safety Level_______  Bed Alarm On? Refused  Sitter?  No     Plan for upcoming shift: monitor blood levels, pain management.            Discharge Plan: No      Active Consults:  None

## 2017-05-24 NOTE — ROUTINE PROCESS
No surgery found *  Bedside and Verbal shift change report given to [address] (oncoming nurse) by Jay Ndiaye RN (offgoing nurse). Report included the following information SBAR, Kardex, ED Summary, Intake/Output, MAR and Cardiac Rhythm NSR.     Zone Phone: [phone]          Significant changes during shift: none               Patient Information      Fiorella Wellington  49 y.o.  5/22/2017 1:01 PM by Tammy Padilla MD. Fiorella Wellington was admitted from Home      Problem List              Patient Active Problem List      Diagnosis Date Noted    Sickle cell crisis ([facility] [address] 05/22/2017    Orbital fracture ([facility] [address] 11/20/2016    Furuncle of axilla 07/21/2016    Depression 10/05/2014    Sickle cell anemia ([facility] [address] 10/03/2014    Hypertension 10/03/2014    Venous insufficiency 10/03/2014    Hepatitis C 10/03/2014               Past Medical History:   Diagnosis Date    Anemia         SC hemoglobinopathy    Chronic pain       Depression       Hypertension       Liver disease         hepatitis C               Core Measures:      CVA: No No  CHF:No No  PNA:No No              Activity Status:      OOB to Chair Yes  Ambulated this shift Yes   Bed Rest No      Supplemental O2: (If Applicable)      NC no  NRB No  Venti-mask No  On room air Liters/min          LINES AND DRAINS:      20G Left upper arm      DVT prophylaxis:      DVT prophylaxis Med- Yes  DVT prophylaxis SCD or SARA- No       Wounds: (If Applicable)      Wounds- No      Location       Patient Safety:      Falls Score Total Score: 1  Safety Level_______  Bed Alarm On? Refused  Sitter?  No      Plan for upcoming shift: discharge possible               Discharge Plan: No       Active Consults:  None

## 2017-05-24 NOTE — PROGRESS NOTES
Initial Nutrition Assessment:    INTERVENTIONS/RECOMMENDATIONS:   · Meals/Snacks: General/healthful diet: Continue diet per MD  · Initial/Brief Nutrition Education: Survival information: Brief Cardiac diet education provided    ASSESSMENT:   Pt medically noted with sickle cell crisis, hypokalemia, HTN, and hepatitis C. PMH noted below. Chart reviewed; RD consulted for heart healthy diet education. Pt was alert and oriented at time of visit, no family members present. Pt was receptive to diet education. Heart Healthy/Cardiac TLC diet education explained and handout provided. Encouraged whole grain choices, lean meats, low-fat dairy, fresh fruits/vegetables and low sodium options. Pt states that she does not cook very often, encouraged quick options such as steamable frozen bags and looking for low-sodium/sodium-free items for meal planning. List of recommended and not recommended foods items per food group provided and encouraged to contact Nutrition Services if any further questions arise. Past Medical History:   Diagnosis Date    Anemia     SC hemoglobinopathy    Chronic pain     Depression     Hypertension     Liver disease     hepatitis C     Diet Order: Regular  % Eaten:  No data found. Pertinent Medications: [x]Reviewed []Other: dextrose 5% and 1/2 NS with KCl @125mL/hr; lovenox; flovite; dilaudid; nitrobid; zofran; diovan  Pertinent Labs: [x]Reviewed []Other: Bilirubin 2.8; Hgb 7.1;  Hct 21.9  Food Allergies: [x]None []Other   Last BM: 5/22   [x]Active     []Hyperactive  []Hypoactive       [] Absent BS  Skin:    [x] Intact   [] Incision  [] Breakdown  [] Other:    Anthropometrics:   Height: 5' 10\" (177.8 cm) Weight: 86.1 kg (189 lb 13.1 oz)   IBW (%IBW):   ( ) UBW (%UBW):   (  %)   Last Weight Metrics:  Weight Loss Metrics 5/22/2017 4/27/2017 4/6/2017 3/10/2017 12/9/2016 11/15/2016 10/21/2016   Today's Wt 189 lb 13.1 oz 199 lb 11.2 oz 202 lb 9.6 oz 198 lb 3.2 oz 203 lb 1.6 oz 208 lb 9.6 oz 197 lb BMI 27.24 kg/m2 27.85 kg/m2 28.26 kg/m2 27.64 kg/m2 28.33 kg/m2 29.09 kg/m2 27.48 kg/m2       BMI: Body mass index is 27.24 kg/(m^2). This BMI is indicative of:   []Underweight    []Normal    [x]Overweight    [] Obesity   [] Extreme Obesity (BMI>40)     Estimated Nutrition Needs (Based on):   2001 Kcals/day (BMR 1539 x 1.3 AF) , 69 g (0.8g/kg bw) Protein  Carbohydrate: At Least 130 g/day  Fluids: 2000 mL/day (1ml/kcal) or per MD    Pt expected to meet estimated nutrient needs: [x]Yes []No    NUTRITION DIAGNOSES:   Problem:   (No Acute Nutiriton Diagnosis at this time)      Etiology: related to       Signs/Symptoms: as evidenced by        NUTRITION INTERVENTIONS:  Meals/Snacks: General/healthful diet         Initial/Brief Nutrition Education: Survival information        GOAL:   Pt will consume >75% meals next 5-7 days    LEARNING NEEDS (Diet, Food/Nutrient-Drug Interaction):    [] None Identified   [x] Identified and Education Provided/Documented: Brief Heart Healthy/Cardiac TLC diet education provided   [] Identified and Pt declined/was not appropriate     Cultureal, Hoahaoism, OR Ethnic Dietary Needs:    [x] None Identified   [] Identified and Addressed     [x] Interdisciplinary Care Plan Reviewed/Documented    [x] Discharge Planning: Heart Healthy Diet      MONITORING /EVALUATION:      Food/Nutrient Intake Outcomes:  Total energy intake  Physical Signs/Symptoms Outcomes: Weight/weight change, GI, Electrolyte and renal profile, GI profile, Glucose profile    NUTRITION RISK:    [] High              [] Moderate           [x]  Low  []  Minimal/Uncompromised    PT SEEN FOR:    [x]  MD Consult: []Calorie Count      []Diabetic Diet Education        [x]Diet Education     []Electrolyte Management     []General Nutrition Management and Supplements     []Management of Tube Feeding     []TPN Recommendations    []  RN Referral:  []MST score >=2     []Enteral/Parenteral Nutrition PTA     []Pregnant: Gestational DM or Multigestation     []Pressure Ulcer/Wound Care needs        []  Low BMI  []  DTR Referral       Ramona Urbina  Pager [phone]  Weekend Pager [phone]

## 2017-05-25 LAB
ANION GAP BLD CALC-SCNC: 5 MMOL/L (ref 5–15)
BASOPHILS # BLD AUTO: 0.1 K/UL
BASOPHILS # BLD: 1 %
BLASTS NFR BLD: 0 %
BUN SERPL-MCNC: 3 MG/DL (ref 6–20)
BUN/CREAT SERPL: 4 (ref 12–20)
CALCIUM SERPL-MCNC: 8.2 MG/DL (ref 8.5–10.1)
CHLORIDE SERPL-SCNC: 108 MMOL/L (ref 97–108)
CO2 SERPL-SCNC: 28 MMOL/L (ref 21–32)
CREAT SERPL-MCNC: 0.72 MG/DL (ref 0.55–1.02)
DIFFERENTIAL METHOD BLD: ABNORMAL
EOSINOPHIL # BLD: 0.3 K/UL
EOSINOPHIL NFR BLD: 3 %
ERYTHROCYTE [DISTWIDTH] IN BLOOD BY AUTOMATED COUNT: 15.8 % (ref 11.5–14.5)
GLUCOSE SERPL-MCNC: 109 MG/DL (ref 65–100)
HCT VFR BLD AUTO: 20.5 % (ref 35–47)
HGB BLD-MCNC: 7 G/DL (ref 11.5–16)
LYMPHOCYTES # BLD AUTO: 52 %
LYMPHOCYTES # BLD: 5.4 K/UL
MANUAL DIFFERENTIAL PERFORMED BLD QL: ABNORMAL
MCH RBC QN AUTO: 33 PG (ref 26–34)
MCHC RBC AUTO-ENTMCNC: 34.1 G/DL (ref 30–36.5)
MCV RBC AUTO: 96.7 FL (ref 80–99)
METAMYELOCYTES NFR BLD MANUAL: 0 %
MONOCYTES # BLD: 0.6 K/UL
MONOCYTES NFR BLD AUTO: 6 %
MYELOCYTES NFR BLD MANUAL: 0 %
NEUTS BAND NFR BLD MANUAL: 0 %
NEUTS SEG # BLD: 3.9 K/UL
NEUTS SEG NFR BLD AUTO: 38 %
NRBC # BLD: 0.35 K/UL (ref 0–0.01)
NRBC BLD-RTO: 3.4 PER 100 WBC
PLATELET # BLD AUTO: 218 K/UL (ref 150–400)
PLATELET COMMENTS,PCOM: ABNORMAL
POTASSIUM SERPL-SCNC: 4.1 MMOL/L (ref 3.5–5.1)
PROMYELOCYTES NFR BLD MANUAL: 0 %
RBC # BLD AUTO: 2.12 M/UL (ref 3.8–5.2)
RBC MORPH BLD: ABNORMAL
SODIUM SERPL-SCNC: 141 MMOL/L (ref 136–145)
WBC # BLD AUTO: 10.3 K/UL (ref 3.6–11)
WBC NRBC COR # BLD: ABNORMAL 10*3/UL
WBC OTHER # BLD MANUAL: 0 10*3/UL

## 2017-05-25 PROCEDURE — 36415 COLL VENOUS BLD VENIPUNCTURE: CPT | Performed by: INTERNAL MEDICINE

## 2017-05-25 PROCEDURE — 74011250637 HC RX REV CODE- 250/637: Performed by: INTERNAL MEDICINE

## 2017-05-25 PROCEDURE — 97161 PT EVAL LOW COMPLEX 20 MIN: CPT

## 2017-05-25 PROCEDURE — 85027 COMPLETE CBC AUTOMATED: CPT | Performed by: INTERNAL MEDICINE

## 2017-05-25 PROCEDURE — G8979 MOBILITY GOAL STATUS: HCPCS

## 2017-05-25 PROCEDURE — 74011250636 HC RX REV CODE- 250/636: Performed by: INTERNAL MEDICINE

## 2017-05-25 PROCEDURE — 80048 BASIC METABOLIC PNL TOTAL CA: CPT | Performed by: INTERNAL MEDICINE

## 2017-05-25 PROCEDURE — 65660000000 HC RM CCU STEPDOWN

## 2017-05-25 PROCEDURE — G8978 MOBILITY CURRENT STATUS: HCPCS

## 2017-05-25 PROCEDURE — G8980 MOBILITY D/C STATUS: HCPCS

## 2017-05-25 RX ORDER — DEXTROSE, SODIUM CHLORIDE, AND POTASSIUM CHLORIDE 5; .45; .075 G/100ML; G/100ML; G/100ML
100 INJECTION INTRAVENOUS CONTINUOUS
Status: DISCONTINUED | OUTPATIENT
Start: 2017-05-25 | End: 2017-05-26

## 2017-05-25 RX ADMIN — DIPHENHYDRAMINE HYDROCHLORIDE 50 MG: 50 INJECTION, SOLUTION INTRAMUSCULAR; INTRAVENOUS at 17:19

## 2017-05-25 RX ADMIN — DEXTROSE MONOHYDRATE, SODIUM CHLORIDE, AND POTASSIUM CHLORIDE 100 ML/HR: 50; 4.5; .745 INJECTION, SOLUTION INTRAVENOUS at 21:50

## 2017-05-25 RX ADMIN — HYDROMORPHONE HYDROCHLORIDE 2 MG: 2 INJECTION, SOLUTION INTRAMUSCULAR; INTRAVENOUS; SUBCUTANEOUS at 01:59

## 2017-05-25 RX ADMIN — Medication 10 ML: at 11:57

## 2017-05-25 RX ADMIN — Medication 10 ML: at 05:08

## 2017-05-25 RX ADMIN — Medication 10 ML: at 21:32

## 2017-05-25 RX ADMIN — DIPHENHYDRAMINE HYDROCHLORIDE 50 MG: 50 INJECTION, SOLUTION INTRAMUSCULAR; INTRAVENOUS at 21:32

## 2017-05-25 RX ADMIN — ENOXAPARIN SODIUM 40 MG: 40 INJECTION SUBCUTANEOUS at 21:31

## 2017-05-25 RX ADMIN — ONDANSETRON HYDROCHLORIDE 4 MG: 2 INJECTION, SOLUTION INTRAMUSCULAR; INTRAVENOUS at 11:51

## 2017-05-25 RX ADMIN — HYDROMORPHONE HYDROCHLORIDE 2 MG: 2 INJECTION, SOLUTION INTRAMUSCULAR; INTRAVENOUS; SUBCUTANEOUS at 21:32

## 2017-05-25 RX ADMIN — HYDROMORPHONE HYDROCHLORIDE 2 MG: 2 INJECTION, SOLUTION INTRAMUSCULAR; INTRAVENOUS; SUBCUTANEOUS at 15:12

## 2017-05-25 RX ADMIN — FAMOTIDINE 20 MG: 20 TABLET ORAL at 17:19

## 2017-05-25 RX ADMIN — Medication 10 ML: at 01:59

## 2017-05-25 RX ADMIN — DIPHENHYDRAMINE HYDROCHLORIDE 50 MG: 50 INJECTION, SOLUTION INTRAMUSCULAR; INTRAVENOUS at 11:51

## 2017-05-25 RX ADMIN — HYDROMORPHONE HYDROCHLORIDE 2 MG: 2 INJECTION, SOLUTION INTRAMUSCULAR; INTRAVENOUS; SUBCUTANEOUS at 05:08

## 2017-05-25 RX ADMIN — FOLIC ACID 1 MG: 1 TABLET ORAL at 08:39

## 2017-05-25 RX ADMIN — ONDANSETRON HYDROCHLORIDE 4 MG: 2 INJECTION, SOLUTION INTRAMUSCULAR; INTRAVENOUS at 17:19

## 2017-05-25 RX ADMIN — ONDANSETRON HYDROCHLORIDE 4 MG: 2 INJECTION, SOLUTION INTRAMUSCULAR; INTRAVENOUS at 05:08

## 2017-05-25 RX ADMIN — DEXTROSE MONOHYDRATE, SODIUM CHLORIDE, AND POTASSIUM CHLORIDE: 50; 4.5; 1.49 INJECTION, SOLUTION INTRAVENOUS at 05:07

## 2017-05-25 RX ADMIN — DIPHENHYDRAMINE HYDROCHLORIDE 50 MG: 50 INJECTION, SOLUTION INTRAMUSCULAR; INTRAVENOUS at 05:08

## 2017-05-25 RX ADMIN — ONDANSETRON HYDROCHLORIDE 4 MG: 2 INJECTION, SOLUTION INTRAMUSCULAR; INTRAVENOUS at 21:32

## 2017-05-25 RX ADMIN — VALSARTAN 80 MG: 80 TABLET ORAL at 08:39

## 2017-05-25 RX ADMIN — HYDROMORPHONE HYDROCHLORIDE 2 MG: 2 INJECTION, SOLUTION INTRAMUSCULAR; INTRAVENOUS; SUBCUTANEOUS at 18:19

## 2017-05-25 RX ADMIN — HYDROMORPHONE HYDROCHLORIDE 2 MG: 2 INJECTION, SOLUTION INTRAMUSCULAR; INTRAVENOUS; SUBCUTANEOUS at 08:27

## 2017-05-25 RX ADMIN — FAMOTIDINE 20 MG: 20 TABLET ORAL at 08:39

## 2017-05-25 RX ADMIN — HYDROMORPHONE HYDROCHLORIDE 2 MG: 2 INJECTION, SOLUTION INTRAMUSCULAR; INTRAVENOUS; SUBCUTANEOUS at 11:51

## 2017-05-25 RX ADMIN — DEXTROSE MONOHYDRATE, SODIUM CHLORIDE, AND POTASSIUM CHLORIDE 100 ML/HR: 50; 4.5; .745 INJECTION, SOLUTION INTRAVENOUS at 11:51

## 2017-05-25 NOTE — PROGRESS NOTES
physical Therapy EVALUATION/DISCHARGE  Patient: Mireya Marin (49 y.o. female)  Date: 5/25/2017  Primary Diagnosis: Sickle cell crisis [facility])        Precautions:      ASSESSMENT :  Based on the objective data described below, the patient presents with baseline mobility and balance. PTA she was independent, still working and driving. She was received in supine and cleared by nursing to mobilize. She performed all mobility at an independent level and was able to push her IV pole for 400ft without AD and no LOB. She was returned to her room where she was went into the bathroom and performed all bathroom ADLs independently. Encouraged pt to mobilize with nursing in the halls 3x daily for the duration of hospital stay. Will sign off at this time. Skilled physical therapy is not indicated at this time. PLAN :  Discharge Recommendations: None  Further Equipment Recommendations for Discharge: none     SUBJECTIVE:   Patient stated I am just waiting for my pain to go away.     OBJECTIVE DATA SUMMARY:   HISTORY:    Past Medical History:   Diagnosis Date    Anemia     SC hemoglobinopathy    Chronic pain     Depression     Hypertension     Liver disease     hepatitis C     Past Surgical History:   Procedure Laterality Date    CHEST SURGERY PROCEDURE UNLISTED      status post thor for removal of a benign     HX CHOLECYSTECTOMY      HX ORTHOPAEDIC      bony curettage of an ostial myelitis focus     Prior Level of Function/Home Situation: pt independent, still working and driving  Personal factors and/or comorbidities impacting plan of care:     Home Situation  Home Environment: Private residence  One/Two Story Residence: One story  Living Alone: No  Support Systems: Child(mary), Family member(s), Confucianist / zay community  Patient Expects to be Discharged to[de-identified] Private residence  Current DME Used/Available at Home: None    EXAMINATION/PRESENTATION/DECISION MAKING:   Critical Behavior:  Neurologic State: Alert  Orientation Level: Oriented X4  Cognition: Appropriate for age attention/concentration     Hearing: Auditory  Auditory Impairment: None  Skin:  WDL  Edema: WDL  Range Of Motion:  AROM: Within functional limits           PROM: Within functional limits           Strength:    Strength: Within functional limits                    Tone & Sensation:   Tone: Normal              Sensation: Intact               Coordination:  Coordination: Within functional limits  Vision:      Functional Mobility:  Bed Mobility:  Rolling: Independent  Supine to Sit: Independent  Sit to Supine: Independent     Transfers:  Sit to Stand: Independent  Stand to Sit: Independent                       Balance:   Sitting: Intact  Standing: Intact  Ambulation/Gait Training:  Distance (ft): 400 Feet (ft)  Assistive Device:  (none- pushing her IV pole)  Ambulation - Level of Assistance: Independent                   Functional Measure:  Barthel Index:    Bathin  Bladder: 10  Bowels: 10  Groomin  Dressing: 10  Feeding: 10  Mobility: 15  Stairs: 10  Toilet Use: 10  Transfer (Bed to Chair and Back): 15  Total: 100       Barthel and G-code impairment scale:  Percentage of impairment CH  0% CI  1-19% CJ  20-39% CK  40-59% CL  60-79% CM  80-99% CN  100%   Barthel Score 0-100 100 99-80 79-60 59-40 20-39 1-19   0   Barthel Score 0-20 20 17-19 13-16 9-12 5-8 1-4 0      The Barthel ADL Index: Guidelines  1. The index should be used as a record of what a patient does, not as a record of what a patient could do. 2. The main aim is to establish degree of independence from any help, physical or verbal, however minor and for whatever reason. 3. The need for supervision renders the patient not independent. 4. A patient's performance should be established using the best available evidence. Asking the patient, friends/relatives and nurses are the usual sources, but direct observation and common sense are also important.  However direct testing is not needed. 5. Usually the patient's performance over the preceding 24-48 hours is important, but occasionally longer periods will be relevant. 6. Middle categories imply that the patient supplies over 50 per cent of the effort. 7. Use of aids to be independent is allowed. Steven Cope., Barthel, D.W. (9860). Functional evaluation: the Barthel Index. [facility] (14)2. Rennie Goltz ruddy LETHA Justice, Ashley Narayan., Charo Quintana., Dat, [facility] (1999). Measuring the change indisability after inpatient rehabilitation; comparison of the responsiveness of the Barthel Index and Functional [place] Measure. Journal of Neurology, Neurosurgery, and Psychiatry, 66(4), 279-261. Nallely Gold, N.J.A, VALARIE Goetz, & Jdaa Mattson M.A. (2004.) Assessment of post-stroke quality of life in cost-effectiveness studies: The usefulness of the Barthel Index and the EuroQoL-5D. Quality of Life Research, 13, 776-10         G codes: In compliance with CMSs Claims Based Outcome Reporting, the following G-code set was chosen for this patient based on their primary functional limitation being treated: The outcome measure chosen to determine the severity of the functional limitation was the barthel with a score of 100/100 which was correlated with the impairment scale.     ? Mobility - Walking and Moving Around:     - CURRENT STATUS: CH - 0% impaired, limited or restricted    - GOAL STATUS: CH - 0% impaired, limited or restricted    - D/C STATUS:  CH - 0% impaired, limited or restricted        Physical Therapy Evaluation Charge Determination   History Examination Presentation Decision-Making   MEDIUM  Complexity : 1-2 comorbidities / personal factors will impact the outcome/ POC  LOW Complexity : 1-2 Standardized tests and measures addressing body structure, function, activity limitation and / or participation in recreation  MEDIUM Complexity : Evolving with changing characteristics  LOW Complexity : FOTO score of       Based on the above components, the patient evaluation is determined to be of the following complexity level: LOW     Pain:  Pain Scale 1: Numeric (0 - 10)  Pain Intensity 1: 5  Pain Location 1: Back  Activity Tolerance:   WFL  Please refer to the flowsheet for vital signs taken during this treatment. After treatment:   []   Patient left in no apparent distress sitting up in chair  [x]   Patient left in no apparent distress in bed  [x]   Call bell left within reach  [x]   Nursing notified  []   Caregiver present  []   Bed alarm activated    COMMUNICATION/EDUCATION:   Communication/Collaboration:  [x]   Fall prevention education was provided and the patient/caregiver indicated understanding. [x]   Patient/family have participated as able and agree with findings and recommendations. []   Patient is unable to participate in plan of care at this time.   Findings and recommendations were discussed with: Registered Nurse    Thank you for this referral.  Eron Merritt, PT, DPT   Time Calculation: 12 mins

## 2017-05-25 NOTE — PROGRESS NOTES
General Daily Progress Note    Admit Date: 5/22/2017    Subjective:     Patient conts to c/o pain but better. Current Facility-Administered Medications   Medication Dose Route Frequency    dextrose 5% - 0.45% NaCl with KCl 20 mEq/L 1,000 mL with potassium chloride 10 mEq infusion   IntraVENous CONTINUOUS    diphenhydrAMINE (BENADRYL) injection 50 mg  50 mg IntraVENous Q4H PRN    famotidine (PEPCID) tablet 20 mg  20 mg Oral BID    sodium chloride (NS) flush 5-10 mL  5-10 mL IntraVENous Q8H    sodium chloride (NS) flush 5-10 mL  5-10 mL IntraVENous PRN    folic acid (FOLVITE) tablet 1 mg  1 mg Oral DAILY    nitroglycerin (NITROBID) 2 % ointment 1 Inch  1 Inch Topical Q6H PRN    sodium chloride (NS) flush 5-10 mL  5-10 mL IntraVENous Q8H    sodium chloride (NS) flush 5-10 mL  5-10 mL IntraVENous PRN    acetaminophen (TYLENOL) tablet 650 mg  650 mg Oral Q6H PRN    ondansetron (ZOFRAN) injection 4 mg  4 mg IntraVENous Q4H PRN    enoxaparin (LOVENOX) injection 40 mg  40 mg SubCUTAneous Q24H    valsartan (DIOVAN) tablet 80 mg  80 mg Oral DAILY    HYDROmorphone (PF) (DILAUDID) injection 2 mg  2 mg IntraVENous Q3H PRN        Review of Systems  A comprehensive review of systems was negative. Objective:     Patient Vitals for the past 24 hrs:   BP Temp Pulse Resp SpO2   05/25/17 0721 125/70 99.1 °F (37.3 °C) 91 20 92 %   05/25/17 0519 141/85 99.3 °F (37.4 °C) 97 20 93 %   05/25/17 0002 (!) 148/95 99 °F (37.2 °C) (!) 103 20 95 %   05/24/17 1856 131/73 99.6 °F (37.6 °C) 86 20 97 %   05/24/17 1513 140/77 99.1 °F (37.3 °C) 91 20 98 %   05/24/17 1119 105/67 99.4 °F (37.4 °C) 86 20 99 %     05/25 0701 - 05/25 1900  In: 958.3 [I.V.:958.3]  Out: -   05/23 1901 - 05/25 0700  In: 785.8 [P.O.:240;  I.V.:545.8]  Out: -     Physical Exam:   Visit Vitals    /70    Pulse 91    Temp 99.1 °F (37.3 °C)    Resp 20    Ht 5' 10\" (1.778 m)    Wt 189 lb 13.1 oz (86.1 kg)    SpO2 92%    BMI 27.24 kg/m2     General appearance: alert, cooperative, no distress, appears stated age  Neck: supple, symmetrical, trachea midline, no adenopathy, thyroid: not enlarged, symmetric, no tenderness/mass/nodules, no carotid bruit and no JVD  Lungs: clear to auscultation bilaterally  Heart: regular rate and rhythm, S1, S2 normal, no murmur, click, rub or gallop  Abdomen: soft, non-tender. Bowel sounds normal. No masses,  no organomegaly  Extremities: extremities normal, atraumatic, no cyanosis or edema        Data Review   Recent Results (from the past 24 hour(s))   CBC WITH MANUAL DIFF    Collection Time: 05/25/17  5:30 AM   Result Value Ref Range    WBC 10.3 3.6 - 11.0 K/uL    RBC 2.12 (L) 3.80 - 5.20 M/uL    HGB 7.0 (L) 11.5 - 16.0 g/dL    HCT 20.5 (L) 35.0 - 47.0 %    MCV 96.7 80.0 - 99.0 FL    MCH 33.0 26.0 - 34.0 PG    MCHC 34.1 30.0 - 36.5 g/dL    RDW 15.8 (H) 11.5 - 14.5 %    PLATELET 573 795 - 714 K/uL    NEUTROPHILS 38 %    BAND NEUTROPHILS 0 %    LYMPHOCYTES 52 %    MONOCYTES 6 %    EOSINOPHILS 3 %    BASOPHILS 1 %    METAMYELOCYTES 0 %    MYELOCYTES 0 %    PROMYELOCYTES 0 %    BLASTS 0 %    OTHER CELL 0      ABS. NEUTROPHILS 3.9 K/UL    ABS. LYMPHOCYTES 5.4 K/UL    ABS. MONOCYTES 0.6 K/UL    ABS. EOSINOPHILS 0.3 K/UL    ABS.  BASOPHILS 0.1 K/UL    PLATELET COMMENTS LARGE PLATELETS      RBC COMMENTS SICKLE CELLS  PRESENT        RBC COMMENTS MATA JOLLY BODIES  PRESENT        RBC COMMENTS ANISOCYTOSIS  2+        RBC COMMENTS POLYCHROMASIA  1+        RBC COMMENTS FEW TARGET  CELLS PRESENT     DF MANUAL      NRBC 3.4 (H) 0  WBC    ABSOLUTE NRBC 0.35 (H) 0.00 - 0.01 K/uL    WBC CORRECTED FOR NR ADJUSTED FOR NUCLEATED RBC'S      DIFFERENTIAL MANUAL DIFFERENTIAL ORDERED     METABOLIC PANEL, BASIC    Collection Time: 05/25/17  5:30 AM   Result Value Ref Range    Sodium 141 136 - 145 mmol/L    Potassium 4.1 3.5 - 5.1 mmol/L    Chloride 108 97 - 108 mmol/L    CO2 28 21 - 32 mmol/L    Anion gap 5 5 - 15 mmol/L    Glucose 109 (H) 65 - 100 mg/dL    BUN 3 (L) 6 - 20 MG/DL    Creatinine 0.72 0.55 - 1.02 MG/DL    BUN/Creatinine ratio 4 (L) 12 - 20      GFR est AA >60 >60 ml/min/1.73m2    GFR est non-AA >60 >60 ml/min/1.73m2    Calcium 8.2 (L) 8.5 - 10.1 MG/DL           Assessment:     Active Problems:    Hypertension (10/3/2014)      Hepatitis C (10/3/2014)      [facility] (5/22/2017)        Plan: 1. Cont treatment of painful crisis. Improvement is slow. 2.Attempting to mobilize.

## 2017-05-25 NOTE — PROGRESS NOTES
* No surgery found *  Bedside and Verbal shift change report given to [address] [place] [address],[address] (oncoming nurse) by Jasbir Maravilla RN(offgoing nurse). Report included the following information SBAR, Kardex, ED Summary, Intake/Output, MAR and Cardiac Rhythm NSR. Zone Phone:   [phone]    Significant changes during shift:  Prn dilaudid given every 3 hours as needed. Iv line leaking reinserted IV by PICC team,         Patient Shayy Cano  49 y.o.  5/22/2017  1:01 PM by Mary Taveras MD. Varsha Ahuja was admitted from Home    Problem List    Patient Active Problem List    Diagnosis Date Noted    Sickle [facility] crisis ([facility] 05/22/2017    Orbital fracture ([facility] 11/20/2016    Furuncle of axilla 07/21/2016    Depression 10/05/2014    Sickle cell anemia ([facility] 10/03/2014    Hypertension 10/03/2014    Venous insufficiency 10/03/2014    Hepatitis C 10/03/2014     Past Medical History:   Diagnosis Date    Anemia     SC hemoglobinopathy    Chronic pain     Depression     Hypertension     Liver disease     hepatitis C         Core Measures:    CVA: No No  CHF:No No  PNA:No No        Activity Status:    OOB to Chair Yes  Ambulated this shift Yes   Bed Rest No    Supplemental O2: (If Applicable)    NC Yes  NRB No  Venti-mask No  On 1 Liters/min      LINES AND DRAINS:    04I Left basilic- NS @687    DVT prophylaxis:    DVT prophylaxis Med- Yes  DVT prophylaxis SCD or SARA- No     Wounds: (If Applicable)    Wounds- No    Location     Patient Safety:    Falls Score Total Score: 1  Safety Level_______  Bed Alarm On? Refused  Sitter? No    Plan for upcoming shift: monitor blood levels, pain management.          Discharge Plan: Yes when stable    Active Consults:  None

## 2017-05-25 NOTE — ROUTINE PROCESS
Bedside and Verbal shift change report given to Romy Mendoza (oncoming nurse) by Analisa Bruno (offgoing nurse). Report included the following information SBAR, Kardex, Intake/Output, MAR, Recent Results and Cardiac Rhythm NSR.

## 2017-05-25 NOTE — PROGRESS NOTES
Bedside and Verbal shift change report given to SAMIR Mejia (oncoming nurse) by Dotty Zambrano RN(offgoing nurse). Report included the following information SBAR, Kardex, ED Summary, Intake/Output, MAR and Cardiac Rhythm NSR.     Zone Phone: [phone]     Significant changes during shift: Prn dilaudid given every 3 hours as needed.          Patient Information     Guille Mckee  49 y.o.  5/22/2017 1:01 PM by Emmanuelle Mccauley MD. Guille Mckee was admitted from Home     Problem List          Patient Active Problem List     Diagnosis Date Noted    Sickle cell crisis ([facility] 05/22/2017    Orbital fracture ([facility] 11/20/2016    Furuncle of axilla 07/21/2016    Depression 10/05/2014    Sickle cell anemia ([facility] 10/03/2014    Hypertension 10/03/2014    Venous insufficiency 10/03/2014    Hepatitis C 10/03/2014           Past Medical History:   Diagnosis Date    Anemia       SC hemoglobinopathy    Chronic pain      Depression      Hypertension      Liver disease       hepatitis C            Core Measures:     CVA: No No  CHF:No No  PNA:No No           Activity Status:     OOB to Chair Yes  Ambulated this shift Yes   Bed Rest No     Supplemental O2: (If Applicable)     NC Yes  NRB No  Venti-mask No  On 1 Liters/min        LINES AND DRAINS:     #22 R wrist     DVT prophylaxis:     DVT prophylaxis Med- Yes  DVT prophylaxis SCD or SARA- No      Wounds: (If Applicable)     Wounds- No     Location      Patient Safety:     Falls Score Total Score: 1  Safety Level_______  Bed Alarm On? Refused  Sitter?  No     Plan for upcoming shift: monitor blood levels, pain management.            Discharge Plan: Yes when stable     Active Consults:

## 2017-05-26 LAB
ANION GAP BLD CALC-SCNC: 6 MMOL/L (ref 5–15)
BASOPHILS # BLD AUTO: 0.1 K/UL
BASOPHILS # BLD: 1 %
BLASTS NFR BLD: 0 %
BUN SERPL-MCNC: 3 MG/DL (ref 6–20)
BUN/CREAT SERPL: 4 (ref 12–20)
CALCIUM SERPL-MCNC: 8.5 MG/DL (ref 8.5–10.1)
CHLORIDE SERPL-SCNC: 104 MMOL/L (ref 97–108)
CO2 SERPL-SCNC: 29 MMOL/L (ref 21–32)
CREAT SERPL-MCNC: 0.72 MG/DL (ref 0.55–1.02)
DIFFERENTIAL METHOD BLD: ABNORMAL
EOSINOPHIL # BLD: 0.4 K/UL
EOSINOPHIL NFR BLD: 4 %
ERYTHROCYTE [DISTWIDTH] IN BLOOD BY AUTOMATED COUNT: 16.7 % (ref 11.5–14.5)
GLUCOSE SERPL-MCNC: 110 MG/DL (ref 65–100)
HCT VFR BLD AUTO: 21.7 % (ref 35–47)
HGB BLD-MCNC: 7.5 G/DL (ref 11.5–16)
LYMPHOCYTES # BLD AUTO: 50 %
LYMPHOCYTES # BLD: 4.7 K/UL
MANUAL DIFFERENTIAL PERFORMED BLD QL: ABNORMAL
MCH RBC QN AUTO: 34.1 PG (ref 26–34)
MCHC RBC AUTO-ENTMCNC: 34.6 G/DL (ref 30–36.5)
MCV RBC AUTO: 98.6 FL (ref 80–99)
METAMYELOCYTES NFR BLD MANUAL: 0 %
MONOCYTES # BLD: 0.9 K/UL
MONOCYTES NFR BLD AUTO: 10 %
MYELOCYTES NFR BLD MANUAL: 0 %
NEUTS BAND NFR BLD MANUAL: 0 %
NEUTS SEG # BLD: 3.3 K/UL
NEUTS SEG NFR BLD AUTO: 35 %
NRBC # BLD: 0.41 K/UL (ref 0–0.01)
NRBC BLD-RTO: 4.3 PER 100 WBC
PLATELET # BLD AUTO: 217 K/UL (ref 150–400)
PLATELET COMMENTS,PCOM: ABNORMAL
POTASSIUM SERPL-SCNC: 3.7 MMOL/L (ref 3.5–5.1)
PROMYELOCYTES NFR BLD MANUAL: 0 %
RBC # BLD AUTO: 2.2 M/UL (ref 3.8–5.2)
RBC MORPH BLD: ABNORMAL
SODIUM SERPL-SCNC: 139 MMOL/L (ref 136–145)
WBC # BLD AUTO: 9.4 K/UL (ref 3.6–11)
WBC NRBC COR # BLD: ABNORMAL 10*3/UL
WBC OTHER # BLD MANUAL: 0 10*3/UL

## 2017-05-26 PROCEDURE — 36415 COLL VENOUS BLD VENIPUNCTURE: CPT | Performed by: INTERNAL MEDICINE

## 2017-05-26 PROCEDURE — 74011000258 HC RX REV CODE- 258: Performed by: INTERNAL MEDICINE

## 2017-05-26 PROCEDURE — 85027 COMPLETE CBC AUTOMATED: CPT | Performed by: INTERNAL MEDICINE

## 2017-05-26 PROCEDURE — 74011250637 HC RX REV CODE- 250/637: Performed by: INTERNAL MEDICINE

## 2017-05-26 PROCEDURE — 77010033678 HC OXYGEN DAILY

## 2017-05-26 PROCEDURE — 76937 US GUIDE VASCULAR ACCESS: CPT

## 2017-05-26 PROCEDURE — 74011250636 HC RX REV CODE- 250/636: Performed by: INTERNAL MEDICINE

## 2017-05-26 PROCEDURE — 80048 BASIC METABOLIC PNL TOTAL CA: CPT | Performed by: INTERNAL MEDICINE

## 2017-05-26 PROCEDURE — 65660000000 HC RM CCU STEPDOWN

## 2017-05-26 RX ORDER — HYDROMORPHONE HYDROCHLORIDE 2 MG/ML
2 INJECTION, SOLUTION INTRAMUSCULAR; INTRAVENOUS; SUBCUTANEOUS
Status: DISCONTINUED | OUTPATIENT
Start: 2017-05-26 | End: 2017-05-26

## 2017-05-26 RX ORDER — DIPHENHYDRAMINE HYDROCHLORIDE 50 MG/ML
50 INJECTION, SOLUTION INTRAMUSCULAR; INTRAVENOUS
Status: DISCONTINUED | OUTPATIENT
Start: 2017-05-26 | End: 2017-05-28 | Stop reason: SDUPTHER

## 2017-05-26 RX ORDER — HYDROMORPHONE HYDROCHLORIDE 2 MG/ML
2 INJECTION, SOLUTION INTRAMUSCULAR; INTRAVENOUS; SUBCUTANEOUS
Status: DISCONTINUED | OUTPATIENT
Start: 2017-05-26 | End: 2017-05-28 | Stop reason: ALTCHOICE

## 2017-05-26 RX ADMIN — HYDROMORPHONE HYDROCHLORIDE 2 MG: 2 INJECTION, SOLUTION INTRAMUSCULAR; INTRAVENOUS; SUBCUTANEOUS at 21:13

## 2017-05-26 RX ADMIN — DIPHENHYDRAMINE HYDROCHLORIDE 50 MG: 50 INJECTION, SOLUTION INTRAMUSCULAR; INTRAVENOUS at 01:07

## 2017-05-26 RX ADMIN — DIPHENHYDRAMINE HYDROCHLORIDE 50 MG: 50 INJECTION, SOLUTION INTRAMUSCULAR; INTRAVENOUS at 04:39

## 2017-05-26 RX ADMIN — ENOXAPARIN SODIUM 40 MG: 40 INJECTION SUBCUTANEOUS at 21:13

## 2017-05-26 RX ADMIN — FOLIC ACID 1 MG: 1 TABLET ORAL at 09:01

## 2017-05-26 RX ADMIN — ONDANSETRON HYDROCHLORIDE 4 MG: 2 INJECTION, SOLUTION INTRAMUSCULAR; INTRAVENOUS at 17:00

## 2017-05-26 RX ADMIN — POTASSIUM CHLORIDE: 149 INJECTION, SOLUTION, CONCENTRATE INTRAVENOUS at 09:23

## 2017-05-26 RX ADMIN — HYDROMORPHONE HYDROCHLORIDE 2 MG: 2 INJECTION, SOLUTION INTRAMUSCULAR; INTRAVENOUS; SUBCUTANEOUS at 01:07

## 2017-05-26 RX ADMIN — HYDROMORPHONE HYDROCHLORIDE 2 MG: 2 INJECTION, SOLUTION INTRAMUSCULAR; INTRAVENOUS; SUBCUTANEOUS at 13:04

## 2017-05-26 RX ADMIN — ONDANSETRON HYDROCHLORIDE 4 MG: 2 INJECTION, SOLUTION INTRAMUSCULAR; INTRAVENOUS at 04:39

## 2017-05-26 RX ADMIN — DIPHENHYDRAMINE HYDROCHLORIDE 50 MG: 50 INJECTION, SOLUTION INTRAMUSCULAR; INTRAVENOUS at 17:00

## 2017-05-26 RX ADMIN — ONDANSETRON HYDROCHLORIDE 4 MG: 2 INJECTION, SOLUTION INTRAMUSCULAR; INTRAVENOUS at 21:14

## 2017-05-26 RX ADMIN — ONDANSETRON HYDROCHLORIDE 4 MG: 2 INJECTION, SOLUTION INTRAMUSCULAR; INTRAVENOUS at 01:07

## 2017-05-26 RX ADMIN — HYDROMORPHONE HYDROCHLORIDE 2 MG: 2 INJECTION, SOLUTION INTRAMUSCULAR; INTRAVENOUS; SUBCUTANEOUS at 09:17

## 2017-05-26 RX ADMIN — Medication 10 ML: at 21:14

## 2017-05-26 RX ADMIN — DIPHENHYDRAMINE HYDROCHLORIDE 50 MG: 50 INJECTION, SOLUTION INTRAMUSCULAR; INTRAVENOUS at 21:14

## 2017-05-26 RX ADMIN — FAMOTIDINE 20 MG: 20 TABLET ORAL at 17:00

## 2017-05-26 RX ADMIN — DIPHENHYDRAMINE HYDROCHLORIDE 50 MG: 50 INJECTION, SOLUTION INTRAMUSCULAR; INTRAVENOUS at 09:01

## 2017-05-26 RX ADMIN — VALSARTAN 80 MG: 80 TABLET ORAL at 09:01

## 2017-05-26 RX ADMIN — HYDROMORPHONE HYDROCHLORIDE 2 MG: 2 INJECTION, SOLUTION INTRAMUSCULAR; INTRAVENOUS; SUBCUTANEOUS at 04:57

## 2017-05-26 RX ADMIN — ONDANSETRON HYDROCHLORIDE 4 MG: 2 INJECTION, SOLUTION INTRAMUSCULAR; INTRAVENOUS at 13:04

## 2017-05-26 RX ADMIN — Medication 10 ML: at 13:04

## 2017-05-26 RX ADMIN — Medication 10 ML: at 01:07

## 2017-05-26 RX ADMIN — DIPHENHYDRAMINE HYDROCHLORIDE 50 MG: 50 INJECTION, SOLUTION INTRAMUSCULAR; INTRAVENOUS at 13:04

## 2017-05-26 RX ADMIN — ONDANSETRON HYDROCHLORIDE 4 MG: 2 INJECTION, SOLUTION INTRAMUSCULAR; INTRAVENOUS at 09:01

## 2017-05-26 RX ADMIN — FAMOTIDINE 20 MG: 20 TABLET ORAL at 09:01

## 2017-05-26 RX ADMIN — HYDROMORPHONE HYDROCHLORIDE 2 MG: 2 INJECTION, SOLUTION INTRAMUSCULAR; INTRAVENOUS; SUBCUTANEOUS at 17:00

## 2017-05-26 NOTE — PROGRESS NOTES
General Daily Progress Note    Admit Date: 5/22/2017    Subjective:     Patient c/o pain . Current Facility-Administered Medications   Medication Dose Route Frequency    HYDROmorphone (PF) (DILAUDID) injection 2 mg  2 mg IntraVENous Q3H PRN    diphenhydrAMINE (BENADRYL) injection 50 mg  50 mg IntraVENous Q4H PRN    HYDROmorphone (PF) (DILAUDID) injection 2 mg  2 mg IntraVENous Q4H PRN    dextrose 5 % - 0.45% NaCl 1,000 mL with potassium chloride 20 mEq infusion   IntraVENous CONTINUOUS    famotidine (PEPCID) tablet 20 mg  20 mg Oral BID    folic acid (FOLVITE) tablet 1 mg  1 mg Oral DAILY    nitroglycerin (NITROBID) 2 % ointment 1 Inch  1 Inch Topical Q6H PRN    sodium chloride (NS) flush 5-10 mL  5-10 mL IntraVENous Q8H    sodium chloride (NS) flush 5-10 mL  5-10 mL IntraVENous PRN    acetaminophen (TYLENOL) tablet 650 mg  650 mg Oral Q6H PRN    ondansetron (ZOFRAN) injection 4 mg  4 mg IntraVENous Q4H PRN    enoxaparin (LOVENOX) injection 40 mg  40 mg SubCUTAneous Q24H    valsartan (DIOVAN) tablet 80 mg  80 mg Oral DAILY        Review of Systems  A comprehensive review of systems was negative.     Objective:     Patient Vitals for the past 24 hrs:   BP Temp Pulse Resp SpO2   05/26/17 0400 140/66 99.8 °F (37.7 °C) 96 18 95 %   05/25/17 2310 122/67 99.5 °F (37.5 °C) 62 18 96 %   05/25/17 1919 134/68 98.8 °F (37.1 °C) 83 18 98 %   05/25/17 1522 133/77 99.1 °F (37.3 °C) 91 18 97 %   05/25/17 1131 121/59 98.8 °F (37.1 °C) 92 18 97 %        05/24 1901 - 05/26 0700  In: 1504.2 [I.V.:1504.2]  Out: -     Physical Exam:   Visit Vitals    /66 (BP 1 Location: Left arm, BP Patient Position: Sitting)    Pulse 96    Temp 99.8 °F (37.7 °C)    Resp 18    Ht 5' 10\" (1.778 m)    Wt 189 lb 13.1 oz (86.1 kg)    SpO2 95%    BMI 27.24 kg/m2     General appearance: alert, cooperative, no distress, appears stated age  Neck: supple, symmetrical, trachea midline, no adenopathy, thyroid: not enlarged, symmetric, no tenderness/mass/nodules, no carotid bruit and no JVD  Lungs: clear to auscultation bilaterally  Heart: regular rate and rhythm, S1, S2 normal, no murmur, click, rub or gallop  Abdomen: soft, non-tender. Bowel sounds normal. No masses,  no organomegaly  Extremities: extremities normal, atraumatic, no cyanosis or edema        Data Review   Recent Results (from the past 24 hour(s))   METABOLIC PANEL, BASIC    Collection Time: 05/26/17  4:50 AM   Result Value Ref Range    Sodium 139 136 - 145 mmol/L    Potassium 3.7 3.5 - 5.1 mmol/L    Chloride 104 97 - 108 mmol/L    CO2 29 21 - 32 mmol/L    Anion gap 6 5 - 15 mmol/L    Glucose 110 (H) 65 - 100 mg/dL    BUN 3 (L) 6 - 20 MG/DL    Creatinine 0.72 0.55 - 1.02 MG/DL    BUN/Creatinine ratio 4 (L) 12 - 20      GFR est AA >60 >60 ml/min/1.73m2    GFR est non-AA >60 >60 ml/min/1.73m2    Calcium 8.5 8.5 - 10.1 MG/DL   CBC WITH MANUAL DIFF    Collection Time: 05/26/17  4:50 AM   Result Value Ref Range    WBC 9.4 3.6 - 11.0 K/uL    RBC 2.20 (L) 3.80 - 5.20 M/uL    HGB 7.5 (L) 11.5 - 16.0 g/dL    HCT 21.7 (L) 35.0 - 47.0 %    MCV 98.6 80.0 - 99.0 FL    MCH 34.1 (H) 26.0 - 34.0 PG    MCHC 34.6 30.0 - 36.5 g/dL    RDW 16.7 (H) 11.5 - 14.5 %    PLATELET 363 261 - 127 K/uL    NEUTROPHILS 35 %    BAND NEUTROPHILS 0 %    LYMPHOCYTES 50 %    MONOCYTES 10 %    EOSINOPHILS 4 %    BASOPHILS 1 %    METAMYELOCYTES 0 %    MYELOCYTES 0 %    PROMYELOCYTES 0 %    BLASTS 0 %    OTHER CELL 0      ABS. NEUTROPHILS 3.3 K/UL    ABS. LYMPHOCYTES 4.7 K/UL    ABS. MONOCYTES 0.9 K/UL    ABS. EOSINOPHILS 0.4 K/UL    ABS.  BASOPHILS 0.1 K/UL    PLATELET COMMENTS LARGE PLATELETS      RBC COMMENTS NORMOCYTIC, NORMOCHROMIC      RBC COMMENTS SICKLE CELLS  PRESENT        RBC COMMENTS TARGET CELLS  PRESENT        DF MANUAL      NRBC 4.3 (H) 0  WBC    ABSOLUTE NRBC 0.41 (H) 0.00 - 0.01 K/uL    WBC CORRECTED FOR NR ADJUSTED FOR NUCLEATED RBC'S      DIFFERENTIAL MANUAL DIFFERENTIAL ORDERED Assessment:     Active Problems:    Hypertension (10/3/2014)      Hepatitis C (10/3/2014)      Sickle cell crisis ([facility] (5/22/2017)        Plan: 1. Cont painful crisis with low grade fever. Cont treatment regimen. 2.Will mobilize.

## 2017-05-26 NOTE — PROGRESS NOTES
Problem: Pain  Goal: *Control of Pain  Outcome: Progressing Towards Goal  Pt complained of 6/10 back pain. Asked for Benedryl and Zofran to be given with Dilaudid, which was done. Pt resting comfortably at present, 1 hour after pain med given.

## 2017-05-26 NOTE — ROUTINE PROCESS
Bedside and Verbal shift change report given to [address] [place] [address] (oncoming nurse) by Mary Raines RN(offgoing nurse). Report included the following information SBAR, Kardex, ED Summary, Intake/Output, MAR and Cardiac Rhythm NSR.     Zone Phone: [phone]          Significant changes during shift: Prn dilaudid given every 3 hours as needed. PRN benadryl and zofran every 4 hours as needed.               Patient Information      Tereso Bingham  49 y.o.  5/22/2017 1:01 PM by Jose Peña MD. Tereso Bingham was admitted from [facility]  Problem List      [address]   Patient Active Problem List      Diagnosis Date Noted    Sickle cell crisis ([facility] 05/22/2017    Orbital fracture ([facility] 11/20/2016    Furuncle of axilla 07/21/2016    Depression 10/05/2014    Sickle cell anemia ([facility] 10/03/2014    Hypertension 10/03/2014    Venous insufficiency 10/03/2014    Hepatitis C 10/03/2014                  Past Medical History:   Diagnosis Date    Anemia         SC hemoglobinopathy    Chronic pain       Depression       Hypertension       Liver disease         hepatitis C               Core Measures:      CVA: No No  CHF:No No  PNA:No No              Activity Status:      OOB to Chair Yes  Ambulated this shift Yes   Bed Rest No      Supplemental O2: (If Applicable)      NC Yes  NRB No  Venti-mask No  On 1 Liters/min          LINES AND DRAINS:      55Z Left basilic- NS @972      DVT prophylaxis:      DVT prophylaxis Med- Yes  DVT prophylaxis SCD or SARA- No       Wounds: (If Applicable)      Wounds- No      Location       Patient Safety:      Falls Score Total Score: 1  Safety Level_______  Bed Alarm On? Refused  Sitter?  No      Plan for upcoming shift: pain management.               Discharge Plan: yes home when ready      Active Consults:  None

## 2017-05-26 NOTE — ROUTINE PROCESS
Bedside and Verbal shift change report given to Alondra Garcia (oncoming nurse) by Nirali Liao (offgoing nurse). Report included the following information SBAR, Kardex, ED Summary, Intake/Output, MAR and Cardiac Rhythm NSR.     Zone Phone: [phone]          Significant changes during shift: Prn dilaudid given every 4 hours as needed. PRN benadryl and zofran every 4 hours as needed.               Patient Information      Francisco Newton  49 y.o.  5/22/2017 1:01 PM by Aracelis Quick MD. Francisco Newton was admitted from [facility]  Problem List      [address]   Patient Active Problem List      Diagnosis Date Noted    Sickle cell crisis ([facility] 05/22/2017    Orbital fracture ([facility] 11/20/2016    Furuncle of axilla 07/21/2016    Depression 10/05/2014    Sickle cell anemia ([facility] 10/03/2014    Hypertension 10/03/2014    Venous insufficiency 10/03/2014    Hepatitis C 10/03/2014                  Past Medical History:   Diagnosis Date    Anemia         SC hemoglobinopathy    Chronic pain       Depression       Hypertension       Liver disease         hepatitis C               Core Measures:      CVA: No No  CHF:No No  PNA:No No              Activity Status:      OOB to Chair Yes  Ambulated this shift Yes   Bed Rest No      Supplemental O2: (If Applicable)      NC Yes  NRB No  Venti-mask No  On 1 Liters/min                DVT prophylaxis:      DVT prophylaxis Med- Yes  DVT prophylaxis SCD or SARA- No             Patient Safety:      Falls Score Total Score: 1  Safety Level_______  Bed Alarm On? Refused  Sitter?  No      Plan for upcoming shift: pain management.               Discharge Plan: yes home when ready      Active Consults:  None

## 2017-05-26 NOTE — PROGRESS NOTES
Bedside and Verbal shift change report given to [address] Giselle (oncoming nurse) by Martina Elizabeth RN(offgoing nurse). Report included the following information SBAR, Kardex, ED Summary, Intake/Output, MAR and Cardiac Rhythm NSR.     Zone Phone: [phone]          Significant changes during shift: Prn dilaudid given every 3 hours as needed. PRN benadryl and zofran every 4 hours as needed.               Patient Information      Yenifer Barragan  49 y.o.  5/22/2017 1:01 PM by Baldev Okeefe MD. Yenifer Barragan was admitted from Home      Problem List              Patient Active Problem List      Diagnosis Date Noted    Sickle cell crisis ([facility] [address] 05/22/2017    Orbital fracture ([facility] [address] 11/20/2016    Furuncle of axilla 07/21/2016    Depression 10/05/2014    Sickle cell anemia ([facility] [address] 10/03/2014    Hypertension 10/03/2014    Venous insufficiency 10/03/2014    Hepatitis C 10/03/2014               Past Medical History:   Diagnosis Date    Anemia         SC hemoglobinopathy    Chronic pain       Depression       Hypertension       Liver disease         hepatitis C               Core Measures:      CVA: No No  CHF:No No  PNA:No No              Activity Status:      OOB to Chair Yes  Ambulated this shift Yes   Bed Rest No      Supplemental O2: (If Applicable)      NC Yes  NRB No  Venti-mask No  On 1 Liters/min          LINES AND DRAINS:      80C Left basilic- NS @527      DVT prophylaxis:      DVT prophylaxis Med- Yes  DVT prophylaxis SCD or SARA- No       Wounds: (If Applicable)      Wounds- No      Location       Patient Safety:      Falls Score Total Score: 1  Safety Level_______  Bed Alarm On? Refused  Sitter?  No      Plan for upcoming shift: pain management.               Discharge Plan: yes when stable      Active Consults:  None

## 2017-05-26 NOTE — PROGRESS NOTES
Pt given the 2nd  Medicare letter and f/u appointment made with Dr. Kristen Balderas.      Tawana Aguayo, [phone]

## 2017-05-27 PROCEDURE — 65660000000 HC RM CCU STEPDOWN

## 2017-05-27 PROCEDURE — 77010033678 HC OXYGEN DAILY

## 2017-05-27 PROCEDURE — 74011000258 HC RX REV CODE- 258: Performed by: INTERNAL MEDICINE

## 2017-05-27 PROCEDURE — 74011250637 HC RX REV CODE- 250/637: Performed by: INTERNAL MEDICINE

## 2017-05-27 PROCEDURE — 74011250636 HC RX REV CODE- 250/636: Performed by: INTERNAL MEDICINE

## 2017-05-27 RX ORDER — DEXTROSE, SODIUM CHLORIDE, AND POTASSIUM CHLORIDE 5; .45; .15 G/100ML; G/100ML; G/100ML
75 INJECTION INTRAVENOUS CONTINUOUS
Status: DISCONTINUED | OUTPATIENT
Start: 2017-05-27 | End: 2017-05-29

## 2017-05-27 RX ADMIN — Medication 10 ML: at 01:16

## 2017-05-27 RX ADMIN — DIPHENHYDRAMINE HYDROCHLORIDE 50 MG: 50 INJECTION, SOLUTION INTRAMUSCULAR; INTRAVENOUS at 09:20

## 2017-05-27 RX ADMIN — FAMOTIDINE 20 MG: 20 TABLET ORAL at 17:25

## 2017-05-27 RX ADMIN — POTASSIUM CHLORIDE: 149 INJECTION, SOLUTION, CONCENTRATE INTRAVENOUS at 01:20

## 2017-05-27 RX ADMIN — Medication 10 ML: at 05:07

## 2017-05-27 RX ADMIN — ONDANSETRON HYDROCHLORIDE 4 MG: 2 INJECTION, SOLUTION INTRAMUSCULAR; INTRAVENOUS at 17:25

## 2017-05-27 RX ADMIN — DIPHENHYDRAMINE HYDROCHLORIDE 50 MG: 50 INJECTION, SOLUTION INTRAMUSCULAR; INTRAVENOUS at 21:17

## 2017-05-27 RX ADMIN — ONDANSETRON HYDROCHLORIDE 4 MG: 2 INJECTION, SOLUTION INTRAMUSCULAR; INTRAVENOUS at 01:16

## 2017-05-27 RX ADMIN — HYDROMORPHONE HYDROCHLORIDE 2 MG: 2 INJECTION, SOLUTION INTRAMUSCULAR; INTRAVENOUS; SUBCUTANEOUS at 01:19

## 2017-05-27 RX ADMIN — DIPHENHYDRAMINE HYDROCHLORIDE 50 MG: 50 INJECTION, SOLUTION INTRAMUSCULAR; INTRAVENOUS at 01:17

## 2017-05-27 RX ADMIN — HYDROMORPHONE HYDROCHLORIDE 2 MG: 2 INJECTION, SOLUTION INTRAMUSCULAR; INTRAVENOUS; SUBCUTANEOUS at 21:17

## 2017-05-27 RX ADMIN — ONDANSETRON HYDROCHLORIDE 4 MG: 2 INJECTION, SOLUTION INTRAMUSCULAR; INTRAVENOUS at 05:07

## 2017-05-27 RX ADMIN — Medication 10 ML: at 21:17

## 2017-05-27 RX ADMIN — DIPHENHYDRAMINE HYDROCHLORIDE 50 MG: 50 INJECTION, SOLUTION INTRAMUSCULAR; INTRAVENOUS at 05:07

## 2017-05-27 RX ADMIN — HYDROMORPHONE HYDROCHLORIDE 2 MG: 2 INJECTION, SOLUTION INTRAMUSCULAR; INTRAVENOUS; SUBCUTANEOUS at 17:25

## 2017-05-27 RX ADMIN — DIPHENHYDRAMINE HYDROCHLORIDE 50 MG: 50 INJECTION, SOLUTION INTRAMUSCULAR; INTRAVENOUS at 17:25

## 2017-05-27 RX ADMIN — ONDANSETRON HYDROCHLORIDE 4 MG: 2 INJECTION, SOLUTION INTRAMUSCULAR; INTRAVENOUS at 09:20

## 2017-05-27 RX ADMIN — FOLIC ACID 1 MG: 1 TABLET ORAL at 08:48

## 2017-05-27 RX ADMIN — VALSARTAN 80 MG: 80 TABLET ORAL at 08:48

## 2017-05-27 RX ADMIN — HYDROMORPHONE HYDROCHLORIDE 2 MG: 2 INJECTION, SOLUTION INTRAMUSCULAR; INTRAVENOUS; SUBCUTANEOUS at 09:20

## 2017-05-27 RX ADMIN — DIPHENHYDRAMINE HYDROCHLORIDE 50 MG: 50 INJECTION, SOLUTION INTRAMUSCULAR; INTRAVENOUS at 13:22

## 2017-05-27 RX ADMIN — HYDROMORPHONE HYDROCHLORIDE 2 MG: 2 INJECTION, SOLUTION INTRAMUSCULAR; INTRAVENOUS; SUBCUTANEOUS at 13:22

## 2017-05-27 RX ADMIN — ONDANSETRON HYDROCHLORIDE 4 MG: 2 INJECTION, SOLUTION INTRAMUSCULAR; INTRAVENOUS at 21:17

## 2017-05-27 RX ADMIN — FAMOTIDINE 20 MG: 20 TABLET ORAL at 08:48

## 2017-05-27 RX ADMIN — ENOXAPARIN SODIUM 40 MG: 40 INJECTION SUBCUTANEOUS at 21:17

## 2017-05-27 RX ADMIN — DEXTROSE MONOHYDRATE, SODIUM CHLORIDE, AND POTASSIUM CHLORIDE 75 ML/HR: 50; 4.5; 1.49 INJECTION, SOLUTION INTRAVENOUS at 13:02

## 2017-05-27 RX ADMIN — HYDROMORPHONE HYDROCHLORIDE 2 MG: 2 INJECTION, SOLUTION INTRAMUSCULAR; INTRAVENOUS; SUBCUTANEOUS at 05:07

## 2017-05-27 RX ADMIN — Medication 10 ML: at 13:21

## 2017-05-27 RX ADMIN — ONDANSETRON HYDROCHLORIDE 4 MG: 2 INJECTION, SOLUTION INTRAMUSCULAR; INTRAVENOUS at 13:22

## 2017-05-27 NOTE — ROUTINE PROCESS
Bedside and Verbal shift change report given to Hilda Lopes (oncoming nurse) by Althea Stover (offgoing nurse). Report included the following information SBAR, Kardex, ED Summary, Intake/Output, MAR and Cardiac Rhythm NSR.     Zone Phone: [phone]          Significant changes during shift: Prn dilaudid given every 4 hours as needed. PRN benadryl and zofran every 4 hours as needed.               Patient Information      Guille Mckee  49 y.o.  5/22/2017 1:01 PM by Emmanuelle Mccauley MD. Guille Mckee was admitted from [facility]  Problem List      [address]   Patient Active Problem List      Diagnosis Date Noted    Sickle cell crisis ([facility] [address] 05/22/2017    Orbital fracture ([facility] [address] 11/20/2016    Furuncle of axilla 07/21/2016    Depression 10/05/2014    Sickle cell anemia ([facility] [address] 10/03/2014    Hypertension 10/03/2014    Venous insufficiency 10/03/2014    Hepatitis C 10/03/2014                  Past Medical History:   Diagnosis Date    Anemia         SC hemoglobinopathy    Chronic pain       Depression       Hypertension       Liver disease         hepatitis C               Core Measures:      CVA: No No  CHF:No No  PNA:No No              Activity Status:      OOB to Chair Yes  Ambulated this shift Yes   Bed Rest No      Supplemental O2: (If Applicable)      NC Yes  NRB No  Venti-mask No  On 1 Liters/min                DVT prophylaxis:      DVT prophylaxis Med- Yes  DVT prophylaxis SCD or SARA- No             Patient Safety:      Falls Score Total Score: 1  Safety Level_______  Bed Alarm On? Refused  Sitter?  No      Plan for upcoming shift: pain management.               Discharge Plan: yes home when ready      Active Consults:  None

## 2017-05-27 NOTE — PROGRESS NOTES
Bedside and Verbal shift change report given to Lakeshia Benz (oncoming nurse) by Sulma Marshall (offgoing nurse). Report included the following information SBAR, Kardex, ED Summary, Intake/Output, MAR and Cardiac Rhythm NSR.     Zone Phone: [phone]          Significant changes during shift: Prn dilaudid given every 4 hours as needed. PRN benadryl and zofran every 4 hours as needed.               Patient Information      Mary Hyman  49 y.o.  5/22/2017 1:01 PM by Bonilla Palmer MD. Mary Hyman was admitted from [facility]  Problem List      [address]   Patient Active Problem List      Diagnosis Date Noted    Sickle cell crisis ([facility] [address] 05/22/2017    Orbital fracture ([facility] [address] 11/20/2016    Furuncle of axilla 07/21/2016    Depression 10/05/2014    Sickle cell anemia ([facility] [address] 10/03/2014    Hypertension 10/03/2014    Venous insufficiency 10/03/2014    Hepatitis C 10/03/2014                  Past Medical History:   Diagnosis Date    Anemia         SC hemoglobinopathy    Chronic pain       Depression       Hypertension       Liver disease         hepatitis C               Core Measures:      CVA: No No  CHF:No No  PNA:No No              Activity Status:      OOB to Chair Yes  Ambulated this shift Yes   Bed Rest No      Supplemental O2: (If Applicable)      NC Yes  NRB No  Venti-mask No  On 1 Liters/min                 DVT prophylaxis:      DVT prophylaxis Med- Yes  DVT prophylaxis SCD or SARA- No              Patient Safety:      Falls Score Total Score: 1  Safety Level_______  Bed Alarm On? Refused  Sitter?  No      Plan for upcoming shift: pain management.               Discharge Plan: yes home when ready      Active Consults:  None

## 2017-05-27 NOTE — PROGRESS NOTES
Hospital Progress Note    NAME:  Jenny Wallace   :   1960   MRN:  [id number]     Date/Time:  2017 8:52 AM    Plan:   1. Iv fluids  2. Analgesics  3.  Encourage activity  Risk of Deterioration: Low  []           Moderate  []           High  []                 Assessment:   Active Problems:    Hypertension (10/3/2014)      Hepatitis C (10/3/2014)      Sickle cell crisis ([facility] [address] (2017)       Subjective:     C/o low back pain  11 Point Review of Systems:   Negative except no cp/sob    []            Unable to obtain ROS due to:       []            mental status change []            sedated []            intubated     Social History   Substance Use Topics    Smoking status: Never Smoker    Smokeless tobacco: Never Used    Alcohol use No     Medications reviewed:  Current Facility-Administered Medications   Medication Dose Route Frequency    diphenhydrAMINE (BENADRYL) injection 50 mg  50 mg IntraVENous Q4H PRN    dextrose 5 % - 0.45% NaCl 1,000 mL with potassium chloride 20 mEq infusion   IntraVENous CONTINUOUS    HYDROmorphone (PF) (DILAUDID) injection 2 mg  2 mg IntraVENous Q3H PRN    famotidine (PEPCID) tablet 20 mg  20 mg Oral BID    folic acid (FOLVITE) tablet 1 mg  1 mg Oral DAILY    nitroglycerin (NITROBID) 2 % ointment 1 Inch  1 Inch Topical Q6H PRN    sodium chloride (NS) flush 5-10 mL  5-10 mL IntraVENous Q8H    sodium chloride (NS) flush 5-10 mL  5-10 mL IntraVENous PRN    acetaminophen (TYLENOL) tablet 650 mg  650 mg Oral Q6H PRN    ondansetron (ZOFRAN) injection 4 mg  4 mg IntraVENous Q4H PRN    enoxaparin (LOVENOX) injection 40 mg  40 mg SubCUTAneous Q24H    valsartan (DIOVAN) tablet 80 mg  80 mg Oral DAILY        Objective:   Vitals:  Visit Vitals    /79 (BP 1 Location: Left arm, BP Patient Position: At rest;Sitting)    Pulse 84    Temp 98.7 °F (37.1 °C)    Resp 18    Ht 5' 10\" (1.778 m)    Wt 189 lb 13.1 oz (86.1 kg)    SpO2 99%    BMI 27.24 kg/m2     Temp (24hrs), Av °F (37.2 °C), Min:98.7 °F (37.1 °C), Max:99.4 °F (37.4 °C)    O2 Flow Rate (L/min): 1 l/min O2 Device: Nasal cannula    Last 24hr Input/Output:  No intake or output data in the 24 hours ending 17 0852     PHYSICAL EXAM:  General:    Alert, cooperative, no distress, appears stated age. Head:   Normocephalic, without obvious abnormality, atraumatic. Eyes:   Conjunctivae/corneas clear. PERRLA  Nose:  Nares normal. No drainage or sinus tenderness. Throat:    Lips, mucosa, and tongue normal.  No Thrush  Neck:  Supple, symmetrical,  no adenopathy, thyroid: non tender    no carotid bruit and no JVD. Back:    Symmetric,  No CVA tenderness. Lungs:   Clear to auscultation bilaterally. No Wheezing or Rhonchi. No rales. Chest wall:  No tenderness or deformity. No Accessory muscle use. Heart:   Regular rate and rhythm,  no murmur, rub or gallop. Abdomen:   Soft, non-tender. Not distended. Bowel sounds normal. No masses  Extremities: Extremities normal, atraumatic, No cyanosis. No edema. No clubbing  Skin:     Texture, turgor normal. No rashes or lesions. Not Jaundiced  Lymph nodes: Cervical, supraclavicular normal.  Psych:  depressed. Not anxious or agitated. Neurologic: Normal strength, Alert and oriented X 3.        Lab Data Reviewed:    Recent Labs      17   0450  17   0530   WBC  9.4  10.3   HGB  7.5*  7.0*   HCT  21.7*  20.5*   PLT  217  218     Recent Labs      17   0450  17   0530   NA  139  141   K  3.7  4.1   CL  104  108   CO2  29  28   GLU  110*  109*   BUN  3*  3*   CREA  0.72  0.72   CA  8.5  8.2*       ___________________________________________________  ___________________________________________________    Attending Physician: Gala Johnston MD

## 2017-05-28 LAB
ANION GAP BLD CALC-SCNC: 7 MMOL/L (ref 5–15)
BASOPHILS # BLD AUTO: 0 K/UL (ref 0–0.1)
BASOPHILS # BLD: 0 % (ref 0–1)
BUN SERPL-MCNC: 5 MG/DL (ref 6–20)
BUN/CREAT SERPL: 7 (ref 12–20)
CALCIUM SERPL-MCNC: 8.8 MG/DL (ref 8.5–10.1)
CHLORIDE SERPL-SCNC: 104 MMOL/L (ref 97–108)
CO2 SERPL-SCNC: 31 MMOL/L (ref 21–32)
CREAT SERPL-MCNC: 0.68 MG/DL (ref 0.55–1.02)
DIFFERENTIAL METHOD BLD: ABNORMAL
EOSINOPHIL # BLD: 0.4 K/UL (ref 0–0.4)
EOSINOPHIL NFR BLD: 5 % (ref 0–7)
ERYTHROCYTE [DISTWIDTH] IN BLOOD BY AUTOMATED COUNT: 16.3 % (ref 11.5–14.5)
GLUCOSE SERPL-MCNC: 103 MG/DL (ref 65–100)
HCT VFR BLD AUTO: 23.2 % (ref 35–47)
HGB BLD-MCNC: 7.9 G/DL (ref 11.5–16)
LDH SERPL L TO P-CCNC: 306 U/L (ref 81–246)
LYMPHOCYTES # BLD AUTO: 29 % (ref 12–49)
LYMPHOCYTES # BLD: 2.1 K/UL (ref 0.8–3.5)
MCH RBC QN AUTO: 33.8 PG (ref 26–34)
MCHC RBC AUTO-ENTMCNC: 34.1 G/DL (ref 30–36.5)
MCV RBC AUTO: 99.1 FL (ref 80–99)
MONOCYTES # BLD: 1.2 K/UL (ref 0–1)
MONOCYTES NFR BLD AUTO: 17 % (ref 5–13)
NEUTS SEG # BLD: 3.4 K/UL (ref 1.8–8)
NEUTS SEG NFR BLD AUTO: 49 % (ref 32–75)
NRBC # BLD: 0.12 K/UL (ref 0–0.01)
NRBC BLD-RTO: 1.7 PER 100 WBC
PLATELET # BLD AUTO: 239 K/UL (ref 150–400)
POTASSIUM SERPL-SCNC: 4.1 MMOL/L (ref 3.5–5.1)
RBC # BLD AUTO: 2.34 M/UL (ref 3.8–5.2)
RBC MORPH BLD: ABNORMAL
RETICS/RBC NFR AUTO: 10.7 % (ref 0.7–2.1)
SODIUM SERPL-SCNC: 142 MMOL/L (ref 136–145)
WBC # BLD AUTO: 7.1 K/UL (ref 3.6–11)
WBC NRBC COR # BLD: ABNORMAL 10*3/UL

## 2017-05-28 PROCEDURE — 74011250637 HC RX REV CODE- 250/637: Performed by: INTERNAL MEDICINE

## 2017-05-28 PROCEDURE — 74011250636 HC RX REV CODE- 250/636: Performed by: INTERNAL MEDICINE

## 2017-05-28 PROCEDURE — 85025 COMPLETE CBC W/AUTO DIFF WBC: CPT | Performed by: INTERNAL MEDICINE

## 2017-05-28 PROCEDURE — 85045 AUTOMATED RETICULOCYTE COUNT: CPT | Performed by: INTERNAL MEDICINE

## 2017-05-28 PROCEDURE — 65660000000 HC RM CCU STEPDOWN

## 2017-05-28 PROCEDURE — 36415 COLL VENOUS BLD VENIPUNCTURE: CPT | Performed by: INTERNAL MEDICINE

## 2017-05-28 PROCEDURE — 80048 BASIC METABOLIC PNL TOTAL CA: CPT | Performed by: INTERNAL MEDICINE

## 2017-05-28 PROCEDURE — 83615 LACTATE (LD) (LDH) ENZYME: CPT | Performed by: INTERNAL MEDICINE

## 2017-05-28 RX ORDER — ONDANSETRON 4 MG/1
4 TABLET, ORALLY DISINTEGRATING ORAL
Status: DISCONTINUED | OUTPATIENT
Start: 2017-05-28 | End: 2017-06-01 | Stop reason: HOSPADM

## 2017-05-28 RX ORDER — HYDROMORPHONE HYDROCHLORIDE 2 MG/1
2 TABLET ORAL
Status: DISCONTINUED | OUTPATIENT
Start: 2017-05-28 | End: 2017-05-30

## 2017-05-28 RX ORDER — DIPHENHYDRAMINE HCL 25 MG
50 CAPSULE ORAL
Status: DISCONTINUED | OUTPATIENT
Start: 2017-05-28 | End: 2017-06-01 | Stop reason: HOSPADM

## 2017-05-28 RX ADMIN — ONDANSETRON 4 MG: 4 TABLET, ORALLY DISINTEGRATING ORAL at 20:32

## 2017-05-28 RX ADMIN — HYDROMORPHONE HYDROCHLORIDE 2 MG: 2 TABLET ORAL at 07:27

## 2017-05-28 RX ADMIN — FOLIC ACID 1 MG: 1 TABLET ORAL at 08:52

## 2017-05-28 RX ADMIN — FAMOTIDINE 20 MG: 20 TABLET ORAL at 08:52

## 2017-05-28 RX ADMIN — HYDROMORPHONE HYDROCHLORIDE 2 MG: 2 TABLET ORAL at 11:30

## 2017-05-28 RX ADMIN — DIPHENHYDRAMINE HYDROCHLORIDE 50 MG: 25 CAPSULE ORAL at 20:32

## 2017-05-28 RX ADMIN — FAMOTIDINE 20 MG: 20 TABLET ORAL at 17:39

## 2017-05-28 RX ADMIN — ONDANSETRON 4 MG: 4 TABLET, ORALLY DISINTEGRATING ORAL at 02:36

## 2017-05-28 RX ADMIN — ONDANSETRON 4 MG: 4 TABLET, ORALLY DISINTEGRATING ORAL at 11:30

## 2017-05-28 RX ADMIN — ENOXAPARIN SODIUM 40 MG: 40 INJECTION SUBCUTANEOUS at 20:32

## 2017-05-28 RX ADMIN — DIPHENHYDRAMINE HYDROCHLORIDE 50 MG: 25 CAPSULE ORAL at 07:27

## 2017-05-28 RX ADMIN — DIPHENHYDRAMINE HYDROCHLORIDE 50 MG: 25 CAPSULE ORAL at 02:36

## 2017-05-28 RX ADMIN — DIPHENHYDRAMINE HYDROCHLORIDE 50 MG: 25 CAPSULE ORAL at 11:29

## 2017-05-28 RX ADMIN — HYDROMORPHONE HYDROCHLORIDE 2 MG: 2 TABLET ORAL at 20:32

## 2017-05-28 RX ADMIN — DIPHENHYDRAMINE HYDROCHLORIDE 50 MG: 25 CAPSULE ORAL at 15:33

## 2017-05-28 RX ADMIN — HYDROMORPHONE HYDROCHLORIDE 2 MG: 2 TABLET ORAL at 15:34

## 2017-05-28 RX ADMIN — HYDROMORPHONE HYDROCHLORIDE 2 MG: 2 TABLET ORAL at 02:36

## 2017-05-28 RX ADMIN — VALSARTAN 80 MG: 80 TABLET ORAL at 08:52

## 2017-05-28 NOTE — PROGRESS NOTES
Bedside and Verbal shift change report given to Edmond Edwards (oncoming nurse) by Jefferson Osuna (offgoing nurse). Report included the following information SBAR, Kardex, ED Summary, Intake/Output, MAR and Cardiac Rhythm NSR.     Zone Phone: [phone]          Significant changes during shift: was able to get a blood draw today, pain medications given Q4,    [place]      Patient Information      Trupti Kerns  49 y.o.  5/22/2017 1:01 PM by Una Rivera MD. Trupti Kerns was admitted from Home      Problem List      [address]   Patient Active Problem List      Diagnosis Date Noted    Sickle cell crisis ([facility] [address] 05/22/2017    Orbital fracture ([facility] [address] 11/20/2016    Furuncle of axilla 07/21/2016    Depression 10/05/2014    Sickle cell anemia ([facility] [address] 10/03/2014    Hypertension 10/03/2014    Venous insufficiency 10/03/2014    Hepatitis C 10/03/2014                  Past Medical History:   Diagnosis Date    Anemia         SC hemoglobinopathy    Chronic pain       Depression       Hypertension       Liver disease         hepatitis C               Core Measures:      CVA: No No  CHF:No No  PNA:No No              Activity Status:      OOB to Chair Yes  Ambulated this shift Yes   Bed Rest No      Supplemental O2: (If Applicable)      NC Yes  NRB No  Venti-mask No  On 1 Liters/min                  DVT prophylaxis:      DVT prophylaxis Med- Yes  DVT prophylaxis SCD or SARA- No               Patient Safety:      Falls Score Total Score: 1  Safety Level_______  Bed Alarm On? Refused  Sitter?  No      Plan for upcoming shift: pain management.               Discharge Plan: yes home when ready      Active Consults:  None

## 2017-05-28 NOTE — PROGRESS NOTES
Bedside and Verbal shift change report given to Hilda Lopes (oncoming nurse) by Althea Stover (offgoing nurse). Report included the following information SBAR, Kardex, ED Summary, Intake/Output, MAR and Cardiac Rhythm NSR.     Zone Phone: [phone]          Significant changes during shift: lost IV access. Spoke with Robbi Brandt. Switched PRN pain and nausea and benadryl to PO. Pt states pain 3/10 but still requesting to receive dilaudid              Patient Information      Gina Calvo  49 y.o.  5/22/2017 1:01 PM by Emmanuelle Mccauley MD. Gina Calvo was admitted from [facility]  Problem List      [address]   Patient Active Problem List      Diagnosis Date Noted    Sickle cell crisis ([facility] [address] 05/22/2017    Orbital fracture ([facility] [address] 11/20/2016    Furuncle of axilla 07/21/2016    Depression 10/05/2014    Sickle cell anemia ([facility] [address] 10/03/2014    Hypertension 10/03/2014    Venous insufficiency 10/03/2014    Hepatitis C 10/03/2014                  Past Medical History:   Diagnosis Date    Anemia         SC hemoglobinopathy    Chronic pain       Depression       Hypertension       Liver disease         hepatitis C               Core Measures:      CVA: No No  CHF:No No  PNA:No No              Activity Status:      OOB to Chair Yes  Ambulated this shift Yes   Bed Rest No      Supplemental O2: (If Applicable)      NC Yes  NRB No  Venti-mask No  On 1 Liters/min                 DVT prophylaxis:      DVT prophylaxis Med- Yes  DVT prophylaxis SCD or SARA- No              Patient Safety:      Falls Score Total Score: 1  Safety Level_______  Bed Alarm On? Refused  Sitter?  No      Plan for upcoming shift: pain management.               Discharge Plan: yes home when ready      Active Consults:  None

## 2017-05-28 NOTE — PROGRESS NOTES
Hospital Progress Note    NAME:  Lindsey Bianchi   :   1960   MRN:  [id number]     Date/Time:  2017 8:52 AM    Plan:   1. Change iv meds to po d/t iv access issues  2. Analgesics  3.  Encourage activity  Risk of Deterioration: Low  []           Moderate  []           High  []                 Assessment:   Active Problems:    Hypertension (10/3/2014)      Hepatitis C (10/3/2014)      Sickle cell crisis ([address] (2017)       Subjective:     C/o low back pain  11 Point Review of Systems:   Negative except no cp/sob    []            Unable to obtain ROS due to:       []            mental status change []            sedated []            intubated     Social History   Substance Use Topics    Smoking status: Never Smoker    Smokeless tobacco: Never Used    Alcohol use No     Medications reviewed:  Current Facility-Administered Medications   Medication Dose Route Frequency    ondansetron (ZOFRAN ODT) tablet 4 mg  4 mg Oral Q6H PRN    diphenhydrAMINE (BENADRYL) capsule 50 mg  50 mg Oral Q4H PRN    HYDROmorphone (DILAUDID) tablet 2 mg  2 mg Oral Q4H PRN    dextrose 5% - 0.45% NaCl with KCl 20 mEq/L infusion  75 mL/hr IntraVENous CONTINUOUS    famotidine (PEPCID) tablet 20 mg  20 mg Oral BID    folic acid (FOLVITE) tablet 1 mg  1 mg Oral DAILY    nitroglycerin (NITROBID) 2 % ointment 1 Inch  1 Inch Topical Q6H PRN    sodium chloride (NS) flush 5-10 mL  5-10 mL IntraVENous Q8H    sodium chloride (NS) flush 5-10 mL  5-10 mL IntraVENous PRN    acetaminophen (TYLENOL) tablet 650 mg  650 mg Oral Q6H PRN    enoxaparin (LOVENOX) injection 40 mg  40 mg SubCUTAneous Q24H    valsartan (DIOVAN) tablet 80 mg  80 mg Oral DAILY        Objective:   Vitals:  Visit Vitals    /75 (BP 1 Location: Left arm, BP Patient Position: At rest)    Pulse 85    Temp 98.6 °F (37 °C)    Resp 16    Ht 5' 10\" (1.778 m)    Wt 189 lb 13.1 oz (86.1 kg)    SpO2 100%    BMI 27.24 kg/m2     Temp (24hrs), Av.8 °F (37.1 °C), Min:98.6 °F (37 °C), Max:99.1 °F (37.3 °C)    O2 Flow Rate (L/min): 1 l/min O2 Device: Room air    Last 24hr Input/Output:  No intake or output data in the 24 hours ending 05/28/17 0710     PHYSICAL EXAM:  General:    Alert, cooperative, no distress, appears stated age. Head:   Normocephalic, without obvious abnormality, atraumatic. Eyes:   Conjunctivae/corneas clear. PERRLA  Nose:  Nares normal. No drainage or sinus tenderness. Throat:    Lips, mucosa, and tongue normal.  No Thrush  Neck:  Supple, symmetrical,  no adenopathy, thyroid: non tender    no carotid bruit and no JVD. Back:    Symmetric,  No CVA tenderness. Lungs:   Clear to auscultation bilaterally. No Wheezing or Rhonchi. No rales. Chest wall:  No tenderness or deformity. No Accessory muscle use. Heart:   Regular rate and rhythm,  no murmur, rub or gallop. Abdomen:   Soft, non-tender. Not distended. Bowel sounds normal. No masses  Extremities: Extremities normal, atraumatic, No cyanosis. No edema. No clubbing  Skin:     Texture, turgor normal. No rashes or lesions. Not Jaundiced  Lymph nodes: Cervical, supraclavicular normal.  Psych:  depressed. Not anxious or agitated. Neurologic: Normal strength, Alert and oriented X 3.        Lab Data Reviewed:    Recent Labs      05/26/17   0450   WBC  9.4   HGB  7.5*   HCT  21.7*   PLT  217     Recent Labs      05/26/17   0450   NA  139   K  3.7   CL  104   CO2  29   GLU  110*   BUN  3*   CREA  0.72   CA  8.5       ___________________________________________________  ___________________________________________________    Attending Physician: Bear Hernández MD

## 2017-05-29 PROBLEM — I47.29 NSVT (NONSUSTAINED VENTRICULAR TACHYCARDIA) (HCC): Status: ACTIVE | Noted: 2017-05-29

## 2017-05-29 LAB
ANION GAP BLD CALC-SCNC: 6 MMOL/L (ref 5–15)
BASOPHILS # BLD AUTO: 0 K/UL (ref 0–0.1)
BASOPHILS # BLD: 0 % (ref 0–1)
BUN SERPL-MCNC: 8 MG/DL (ref 6–20)
BUN/CREAT SERPL: 10 (ref 12–20)
CALCIUM SERPL-MCNC: 8.6 MG/DL (ref 8.5–10.1)
CHLORIDE SERPL-SCNC: 105 MMOL/L (ref 97–108)
CO2 SERPL-SCNC: 30 MMOL/L (ref 21–32)
CREAT SERPL-MCNC: 0.79 MG/DL (ref 0.55–1.02)
EOSINOPHIL # BLD: 0.2 K/UL (ref 0–0.4)
EOSINOPHIL NFR BLD: 3 % (ref 0–7)
ERYTHROCYTE [DISTWIDTH] IN BLOOD BY AUTOMATED COUNT: 15.8 % (ref 11.5–14.5)
GLUCOSE SERPL-MCNC: 99 MG/DL (ref 65–100)
HCT VFR BLD AUTO: 22.4 % (ref 35–47)
HGB BLD-MCNC: 7.9 G/DL (ref 11.5–16)
LDH SERPL L TO P-CCNC: 288 U/L (ref 81–246)
LYMPHOCYTES # BLD AUTO: 34 % (ref 12–49)
LYMPHOCYTES # BLD: 3 K/UL (ref 0.8–3.5)
MAGNESIUM SERPL-MCNC: 2.3 MG/DL (ref 1.6–2.4)
MCH RBC QN AUTO: 34.8 PG (ref 26–34)
MCHC RBC AUTO-ENTMCNC: 35.3 G/DL (ref 30–36.5)
MCV RBC AUTO: 98.7 FL (ref 80–99)
MONOCYTES # BLD: 1.5 K/UL (ref 0–1)
MONOCYTES NFR BLD AUTO: 17 % (ref 5–13)
NEUTS SEG # BLD: 4.1 K/UL (ref 1.8–8)
NEUTS SEG NFR BLD AUTO: 46 % (ref 32–75)
PLATELET # BLD AUTO: 212 K/UL (ref 150–400)
POTASSIUM SERPL-SCNC: 3.9 MMOL/L (ref 3.5–5.1)
RBC # BLD AUTO: 2.27 M/UL (ref 3.8–5.2)
RETICS/RBC NFR AUTO: 9 % (ref 0.7–2.1)
SODIUM SERPL-SCNC: 141 MMOL/L (ref 136–145)
WBC # BLD AUTO: 8.8 K/UL (ref 3.6–11)

## 2017-05-29 PROCEDURE — 83735 ASSAY OF MAGNESIUM: CPT | Performed by: INTERNAL MEDICINE

## 2017-05-29 PROCEDURE — 74011250637 HC RX REV CODE- 250/637: Performed by: INTERNAL MEDICINE

## 2017-05-29 PROCEDURE — 85025 COMPLETE CBC W/AUTO DIFF WBC: CPT | Performed by: INTERNAL MEDICINE

## 2017-05-29 PROCEDURE — 85045 AUTOMATED RETICULOCYTE COUNT: CPT | Performed by: INTERNAL MEDICINE

## 2017-05-29 PROCEDURE — 80048 BASIC METABOLIC PNL TOTAL CA: CPT | Performed by: INTERNAL MEDICINE

## 2017-05-29 PROCEDURE — 36415 COLL VENOUS BLD VENIPUNCTURE: CPT | Performed by: INTERNAL MEDICINE

## 2017-05-29 PROCEDURE — 65660000000 HC RM CCU STEPDOWN

## 2017-05-29 PROCEDURE — 74011250636 HC RX REV CODE- 250/636: Performed by: INTERNAL MEDICINE

## 2017-05-29 PROCEDURE — 83615 LACTATE (LD) (LDH) ENZYME: CPT | Performed by: INTERNAL MEDICINE

## 2017-05-29 RX ADMIN — DIPHENHYDRAMINE HYDROCHLORIDE 50 MG: 25 CAPSULE ORAL at 06:42

## 2017-05-29 RX ADMIN — ONDANSETRON 4 MG: 4 TABLET, ORALLY DISINTEGRATING ORAL at 08:36

## 2017-05-29 RX ADMIN — DIPHENHYDRAMINE HYDROCHLORIDE 50 MG: 25 CAPSULE ORAL at 23:51

## 2017-05-29 RX ADMIN — DIPHENHYDRAMINE HYDROCHLORIDE 50 MG: 25 CAPSULE ORAL at 15:17

## 2017-05-29 RX ADMIN — HYDROMORPHONE HYDROCHLORIDE 2 MG: 2 TABLET ORAL at 15:17

## 2017-05-29 RX ADMIN — ENOXAPARIN SODIUM 40 MG: 40 INJECTION SUBCUTANEOUS at 19:27

## 2017-05-29 RX ADMIN — DIPHENHYDRAMINE HYDROCHLORIDE 50 MG: 25 CAPSULE ORAL at 19:27

## 2017-05-29 RX ADMIN — HYDROMORPHONE HYDROCHLORIDE 2 MG: 2 TABLET ORAL at 06:42

## 2017-05-29 RX ADMIN — HYDROMORPHONE HYDROCHLORIDE 2 MG: 2 TABLET ORAL at 02:05

## 2017-05-29 RX ADMIN — VALSARTAN 80 MG: 80 TABLET ORAL at 08:36

## 2017-05-29 RX ADMIN — HYDROMORPHONE HYDROCHLORIDE 2 MG: 2 TABLET ORAL at 10:52

## 2017-05-29 RX ADMIN — HYDROMORPHONE HYDROCHLORIDE 2 MG: 2 TABLET ORAL at 23:51

## 2017-05-29 RX ADMIN — FAMOTIDINE 20 MG: 20 TABLET ORAL at 08:36

## 2017-05-29 RX ADMIN — FOLIC ACID 1 MG: 1 TABLET ORAL at 08:36

## 2017-05-29 RX ADMIN — HYDROMORPHONE HYDROCHLORIDE 2 MG: 2 TABLET ORAL at 19:27

## 2017-05-29 RX ADMIN — ONDANSETRON 4 MG: 4 TABLET, ORALLY DISINTEGRATING ORAL at 15:18

## 2017-05-29 RX ADMIN — ONDANSETRON 4 MG: 4 TABLET, ORALLY DISINTEGRATING ORAL at 02:05

## 2017-05-29 RX ADMIN — DIPHENHYDRAMINE HYDROCHLORIDE 50 MG: 25 CAPSULE ORAL at 10:52

## 2017-05-29 RX ADMIN — FAMOTIDINE 20 MG: 20 TABLET ORAL at 17:24

## 2017-05-29 RX ADMIN — ONDANSETRON 4 MG: 4 TABLET, ORALLY DISINTEGRATING ORAL at 23:51

## 2017-05-29 RX ADMIN — DIPHENHYDRAMINE HYDROCHLORIDE 50 MG: 25 CAPSULE ORAL at 02:05

## 2017-05-29 NOTE — PROGRESS NOTES
Hospital Progress Note    NAME:  Dwight Vo   :   1960   MRN:  [id number]     Date/Time:  2017 8:52 AM    Plan:   1. Change iv meds to po d/t iv access issues  2. Analgesics  3.  Encourage activity  Risk of Deterioration: Low  []           Moderate  []           High  []                 Assessment:   Active Problems:    Hypertension (10/3/2014)      Hepatitis C (10/3/2014)      Sickle cell crisis ([address] (2017)       Subjective:     C/o low back pain and ha  11 Point Review of Systems:   Negative except no cp/sob    []            Unable to obtain ROS due to:       []            mental status change []            sedated []            intubated     Social History   Substance Use Topics    Smoking status: Never Smoker    Smokeless tobacco: Never Used    Alcohol use No     Medications reviewed:  Current Facility-Administered Medications   Medication Dose Route Frequency    ondansetron (ZOFRAN ODT) tablet 4 mg  4 mg Oral Q6H PRN    diphenhydrAMINE (BENADRYL) capsule 50 mg  50 mg Oral Q4H PRN    HYDROmorphone (DILAUDID) tablet 2 mg  2 mg Oral Q4H PRN    famotidine (PEPCID) tablet 20 mg  20 mg Oral BID    folic acid (FOLVITE) tablet 1 mg  1 mg Oral DAILY    nitroglycerin (NITROBID) 2 % ointment 1 Inch  1 Inch Topical Q6H PRN    acetaminophen (TYLENOL) tablet 650 mg  650 mg Oral Q6H PRN    enoxaparin (LOVENOX) injection 40 mg  40 mg SubCUTAneous Q24H    valsartan (DIOVAN) tablet 80 mg  80 mg Oral DAILY        Objective:   Vitals:  Visit Vitals    /62    Pulse 82    Temp 98.6 °F (37 °C)    Resp 18    Ht 5' 10\" (1.778 m)    Wt 189 lb 13.1 oz (86.1 kg)    SpO2 100%    BMI 27.24 kg/m2     Temp (24hrs), Av °F (37.2 °C), Min:98.6 °F (37 °C), Max:99.5 °F (37.5 °C)    O2 Flow Rate (L/min): 1 l/min O2 Device: Room air    Last 24hr Input/Output:  No intake or output data in the 24 hours ending 17 1008     PHYSICAL EXAM:  General:    Alert, cooperative, no distress, appears stated age. Head:   Normocephalic, without obvious abnormality, atraumatic. Eyes:   Conjunctivae/corneas clear. PERRLA  Nose:  Nares normal. No drainage or sinus tenderness. Throat:    Lips, mucosa, and tongue normal.  No Thrush  Neck:  Supple, symmetrical,  no adenopathy, thyroid: non tender    no carotid bruit and no JVD. Back:    Symmetric,  No CVA tenderness. Lungs:   Clear to auscultation bilaterally. No Wheezing or Rhonchi. No rales. Chest wall:  No tenderness or deformity. No Accessory muscle use. Heart:   Regular rate and rhythm,  no murmur, rub or gallop. Abdomen:   Soft, non-tender. Not distended. Bowel sounds normal. No masses  Extremities: Extremities normal, atraumatic, No cyanosis. No edema. No clubbing  Skin:     Texture, turgor normal. No rashes or lesions. Not Jaundiced  Lymph nodes: Cervical, supraclavicular normal.  Psych:  depressed. Not anxious or agitated. Neurologic: Normal strength, Alert and oriented X 3.        Lab Data Reviewed:    Recent Labs      05/28/17   1343   WBC  7.1   HGB  7.9*   HCT  23.2*   PLT  239     Recent Labs      05/28/17   1343   NA  142   K  4.1   CL  104   CO2  31   GLU  103*   BUN  5*   CREA  0.68   CA  8.8       ___________________________________________________  ___________________________________________________    Attending Physician: Soren Jenkins MD

## 2017-05-29 NOTE — PROGRESS NOTES
Bedside and Verbal shift change report given to Tammy (oncoming nurse) by Emily nurse). Report included the following information SBAR, Kardex, ED Summary, Intake/Output, MAR and Cardiac Rhythm NSR.     Zone Phone: [phone]          Significant changes during shift: prn dilaudid, zofran and benadryl administered every 4-6 hours.             Patient Information      Trupti Kerns  49 y.o.  5/22/2017 1:01 PM by Una Rivera MD. Trupti Kerns was admitted from Home      Problem List      [address]   Patient Active Problem List      Diagnosis Date Noted    Sickle cell crisis ([facility] [address] 05/22/2017    Orbital fracture ([facility] [address] 11/20/2016    Furuncle of axilla 07/21/2016    Depression 10/05/2014    Sickle cell anemia ([facility] [address] 10/03/2014    Hypertension 10/03/2014    Venous insufficiency 10/03/2014    Hepatitis C 10/03/2014                  Past Medical History:   Diagnosis Date    Anemia         SC hemoglobinopathy    Chronic pain       Depression       Hypertension       Liver disease         hepatitis C               Core Measures:      CVA: No No  CHF:No No  PNA:No No              Activity Status:      OOB to Chair Yes  Ambulated this shift Yes   Bed Rest No      Supplemental O2: (If Applicable)      NC Yes  NRB No  Venti-mask No  On 1 Liters/min                  DVT prophylaxis:      DVT prophylaxis Med- Yes  DVT prophylaxis SCD or SARA- No               Patient Safety:      Falls Score Total Score: 1  Safety Level_______  Bed Alarm On? Refused  Sitter?  No      Plan for upcoming shift: pain management.               Discharge Plan: yes home when ready      Active Consults:  None

## 2017-05-29 NOTE — ROUTINE PROCESS
Bedside and Verbal shift change report given to Juan José Ferguson (oncoming nurse) by Kanwal Tucker (offgoing nurse). Report included the following information SBAR, Kardex, ED Summary, Intake/Output, MAR and Cardiac Rhythm NSR.     Zone Phone: [phone]          Significant changes during shift: prn dilaudid, zofran and benadryl administered every 4-6 hours.             Patient Information      Dwight Vo  49 y.o.  5/22/2017 1:01 PM by Jefe Bailey MD. Dwight Vo was admitted from [facility]  Problem List      [address]   Patient Active Problem List      Diagnosis Date Noted    Sickle cell crisis ([facility] [address] 05/22/2017    Orbital fracture ([facility] [address] 11/20/2016    Furuncle of axilla 07/21/2016    Depression 10/05/2014    Sickle cell anemia ([facility] [address] 10/03/2014    Hypertension 10/03/2014    Venous insufficiency 10/03/2014    Hepatitis C 10/03/2014                  Past Medical History:   Diagnosis Date    Anemia         SC hemoglobinopathy    Chronic pain       Depression       Hypertension       Liver disease         hepatitis C                         Activity Status:      OOB to Chair Yes  Ambulated this shift Yes   Bed Rest No                DVT prophylaxis:      DVT prophylaxis Med- Yes  DVT prophylaxis SCD or SARA- No               Patient Safety:      Falls Score Total Score: 1  Safety Level_______  Bed Alarm On? Refused  Sitter?  No      Plan for upcoming shift: pain management.               Discharge Plan: yes home when ready      Active Consults:  None

## 2017-05-30 ENCOUNTER — APPOINTMENT (OUTPATIENT)
Dept: NUCLEAR MEDICINE | Age: 57
DRG: 812 | End: 2017-05-30
Attending: INTERNAL MEDICINE
Payer: MEDICARE

## 2017-05-30 LAB
ATTENDING PHYSICIAN, CST07: NORMAL
DIAGNOSIS, 93000: NORMAL
DUKE TM SCORE RESULT, CST14: NORMAL
DUKE TREADMILL SCORE, CST13: 5456
ECG INTERP BEFORE EX, CST11: NORMAL
ECG INTERP DURING EX, CST12: NORMAL
FUNCTIONAL CAPACITY, CST17: NORMAL
KNOWN CARDIAC CONDITION, CST08: NORMAL
MAX. DIASTOLIC BP, CST04: 71 MMHG
MAX. HEART RATE, CST05: 97 BPM
MAX. SYSTOLIC BP, CST03: 124 MMHG
OVERALL BP RESPONSE TO EXERCISE, CST16: NORMAL
OVERALL HR RESPONSE TO EXERCISE, CST15: NORMAL
PEAK EX METS, CST10: 1 METS
PROTOCOL NAME, CST01: NORMAL
TEST INDICATION, CST09: NORMAL

## 2017-05-30 PROCEDURE — 93306 TTE W/DOPPLER COMPLETE: CPT

## 2017-05-30 PROCEDURE — 65660000000 HC RM CCU STEPDOWN

## 2017-05-30 PROCEDURE — 74011250636 HC RX REV CODE- 250/636: Performed by: INTERNAL MEDICINE

## 2017-05-30 PROCEDURE — 74011250636 HC RX REV CODE- 250/636

## 2017-05-30 PROCEDURE — 74011250637 HC RX REV CODE- 250/637: Performed by: INTERNAL MEDICINE

## 2017-05-30 PROCEDURE — 93017 CV STRESS TEST TRACING ONLY: CPT

## 2017-05-30 PROCEDURE — A9500 TC99M SESTAMIBI: HCPCS

## 2017-05-30 RX ORDER — HYDROCODONE BITARTRATE AND ACETAMINOPHEN 10; 325 MG/1; MG/1
1 TABLET ORAL
Status: DISCONTINUED | OUTPATIENT
Start: 2017-05-30 | End: 2017-06-01

## 2017-05-30 RX ORDER — SODIUM CHLORIDE 0.9 % (FLUSH) 0.9 %
SYRINGE (ML) INJECTION
Status: COMPLETED
Start: 2017-05-30 | End: 2017-05-31

## 2017-05-30 RX ORDER — FENTANYL 75 UG/H
1 PATCH TRANSDERMAL
Status: DISCONTINUED | OUTPATIENT
Start: 2017-05-30 | End: 2017-06-01 | Stop reason: HOSPADM

## 2017-05-30 RX ADMIN — FAMOTIDINE 20 MG: 20 TABLET ORAL at 17:13

## 2017-05-30 RX ADMIN — DIPHENHYDRAMINE HYDROCHLORIDE 50 MG: 25 CAPSULE ORAL at 10:23

## 2017-05-30 RX ADMIN — HYDROCODONE BITARTRATE AND ACETAMINOPHEN 1 TABLET: 10; 325 TABLET ORAL at 10:23

## 2017-05-30 RX ADMIN — FAMOTIDINE 20 MG: 20 TABLET ORAL at 10:26

## 2017-05-30 RX ADMIN — VALSARTAN 80 MG: 80 TABLET ORAL at 10:23

## 2017-05-30 RX ADMIN — FOLIC ACID 1 MG: 1 TABLET ORAL at 10:23

## 2017-05-30 RX ADMIN — REGADENOSON 0.4 MG: 0.08 INJECTION, SOLUTION INTRAVENOUS at 12:25

## 2017-05-30 RX ADMIN — ENOXAPARIN SODIUM 40 MG: 40 INJECTION SUBCUTANEOUS at 21:35

## 2017-05-30 RX ADMIN — HYDROCODONE BITARTRATE AND ACETAMINOPHEN 1 TABLET: 10; 325 TABLET ORAL at 17:13

## 2017-05-30 RX ADMIN — HYDROMORPHONE HYDROCHLORIDE 2 MG: 2 TABLET ORAL at 04:48

## 2017-05-30 RX ADMIN — DIPHENHYDRAMINE HYDROCHLORIDE 50 MG: 25 CAPSULE ORAL at 17:13

## 2017-05-30 RX ADMIN — DIPHENHYDRAMINE HYDROCHLORIDE 50 MG: 25 CAPSULE ORAL at 04:49

## 2017-05-30 NOTE — PROGRESS NOTES
[address] [place] [address]  [phone]      Cardiology Progress Note      5/30/2017 9:30AM    Admit Date: 5/22/2017    Admit Diagnosis:   Sickle cell crisis ([address]    Subjective: Shailesh Del Angel has no c/o CP, SOB. No further episodes of NSVT.  Awaiting Lexiscan this AM    Visit Vitals    /80 (BP 1 Location: Right arm, BP Patient Position: At rest)    Pulse 86    Temp 98.4 °F (36.9 °C)    Resp 18    Ht 5' 10\" (1.778 m)    Wt 86.1 kg (189 lb 13.1 oz)    SpO2 98%    BMI 27.24 kg/m2       Current Facility-Administered Medications   Medication Dose Route Frequency    HYDROcodone-acetaminophen (NORCO)  mg tablet 1 Tab  1 Tab Oral Q6H PRN    fentaNYL (DURAGESIC) 75 mcg/hr patch 1 Patch  1 Patch TransDERmal Q72H    sodium chloride (NS) 0.9 % flush        regadenoson (LEXISCAN) 0.4 mg/5 mL injection        regadenoson (LEXISCAN) injection 0.4 mg  0.4 mg IntraVENous ONCE    ondansetron (ZOFRAN ODT) tablet 4 mg  4 mg Oral Q6H PRN    diphenhydrAMINE (BENADRYL) capsule 50 mg  50 mg Oral Q4H PRN    famotidine (PEPCID) tablet 20 mg  20 mg Oral BID    folic acid (FOLVITE) tablet 1 mg  1 mg Oral DAILY    nitroglycerin (NITROBID) 2 % ointment 1 Inch  1 Inch Topical Q6H PRN    acetaminophen (TYLENOL) tablet 650 mg  650 mg Oral Q6H PRN    enoxaparin (LOVENOX) injection 40 mg  40 mg SubCUTAneous Q24H    valsartan (DIOVAN) tablet 80 mg  80 mg Oral DAILY       Objective:      Physical Exam:  General Appearance:  WNWD AAF in no acute distress  Chest:   Clear  Cardiovascular:  Regular rate and rhythm, no murmur.   Abdomen:   Soft, non-tender, bowel sounds are active.   Extremities: no peripheral edema  Skin:  Warm and dry.     Data Review:   Recent Labs      05/29/17   1528  05/28/17   1343   WBC  8.8  7.1   HGB  7.9*  7.9*   HCT  22.4*  23.2*   PLT  212  239     Recent Labs      05/29/17   1528  05/28/17   1343   NA  141  142   K  3.9  4.1   CL  105  104   CO2  30  31   GLU 99  103*   BUN  8  5*   CREA  0.79  0.68   CA  8.6  8.8   MG  2.3   --        No results for input(s): TROIQ, CPK, CKMB in the last 72 hours. No intake or output data in the 24 hours ending 05/30/17 1222     Telemetry: SR    Assessment:     Active Problems:    Hypertension (10/3/2014)      Hepatitis C (10/3/2014)      Sickle cell crisis ([facility] (5/22/2017)      NSVT (nonsustained ventricular tachycardia) ([facility] (5/29/2017)        Plan:     Hypertension:  Stable on Valsartan    Sickle cell crisis ([facility] [facility] (5/22/2017)     NSVT (nonsustained ventricular tachycardia) ([facility] [facility] (5/29/2017): No further episodes, was asymptomatic. ? Etiology. Echo pending. Richie Alcantara today to evaluate for ischemia. Could be secondary to chronic anemia. Will add low dose betablocker if recurrent episodes. [facility]    5/30/2017         Agree with note as outlined by  NP. I confirm findings in history and physical exam. No additional findings noted. Agree with plan as outlined above.      Carina Guzman MD

## 2017-05-30 NOTE — PROGRESS NOTES
Nutrition Assessment:    RECOMMENDATIONS:   Continue Cardiac diet    ASSESSMENT:   Chart reviewed, pt initially off the floor for MRI but returned. Sitting up in bed awake and alert, reading room service menu. Pt reports a good appetite. Labs reviewed, WNL. No skin breakdown noted. Last BM noted on 5/26, will monitor need for bowel regimen. Dietitians Intervention(s)/Plan(s): Continue diet  SUBJECTIVE/OBJECTIVE:     Diet Order: Cardiac  % Eaten:  No data found. Pertinent Medications:pepcid, folvite. Chemistries:  Lab Results   Component Value Date/Time    Sodium 141 05/29/2017 03:28 PM    Potassium 3.9 05/29/2017 03:28 PM    Chloride 105 05/29/2017 03:28 PM    CO2 30 05/29/2017 03:28 PM    Anion gap 6 05/29/2017 03:28 PM    Glucose 99 05/29/2017 03:28 PM    BUN 8 05/29/2017 03:28 PM    Creatinine 0.79 05/29/2017 03:28 PM    BUN/Creatinine ratio 10 05/29/2017 03:28 PM    GFR est AA >60 05/29/2017 03:28 PM    GFR est non-AA >60 05/29/2017 03:28 PM    Calcium 8.6 05/29/2017 03:28 PM    Albumin 3.6 05/23/2017 03:09 AM      Anthropometrics: Height: 5' 10\" (177.8 cm) Weight: 86.1 kg (189 lb 13.1 oz)    IBW (%IBW):   ( ) UBW (%UBW):   (  %)    BMI: Body mass index is 27.24 kg/(m^2). This BMI is indicative of:  []Underweight   []Normal   [x]Overweight   [] Obesity   [] Extreme Obesity (BMI>40)  Estimated Nutrition Needs (Based on): 2001 Kcals/day (BMR 1539 x 1.3 AF) , 69 g (0.8g/kg bw) Protein  Carbohydrate: At Least 130 g/day  Fluids: 2000 mL/day    Last BM: 5/26   []Active     []Hyperactive  []Hypoactive       [] Absent   BS  Skin:    [x] Intact   [] Incision  [] Breakdown   [] DTI   [] Tears/Excoriation/Abrasion  []Edema [] Other:    Wt Readings from Last 30 Encounters:   05/22/17 86.1 kg (189 lb 13.1 oz)   04/27/17 90.6 kg (199 lb 11.2 oz)   04/06/17 91.9 kg (202 lb 9.6 oz)   03/10/17 89.9 kg (198 lb 3.2 oz)   12/09/16 92.1 kg (203 lb 1.6 oz)   11/15/16 94.6 kg (208 lb 9.6 oz)   10/21/16 89.4 kg (197 lb)   07/21/16 92.1 kg (203 lb)   05/17/16 91.2 kg (201 lb)   04/21/16 92.1 kg (203 lb)   01/19/16 92.1 kg (203 lb)   10/19/15 92.3 kg (203 lb 8 oz)   10/05/15 95.2 kg (209 lb 12.8 oz)   09/18/15 94.8 kg (209 lb)   08/14/15 98 kg (216 lb)   05/07/15 96.9 kg (213 lb 9.6 oz)   04/02/15 96.6 kg (213 lb)   01/16/15 101.1 kg (222 lb 12.8 oz)   11/11/14 100.2 kg (221 lb)   10/03/14 102.5 kg (226 lb)      NUTRITION DIAGNOSES:   Problem:   (No Acute Nutiriton Diagnosis at this time)        NUTRITION INTERVENTIONS:  Meals/Snacks: General/healthful diet         Initial/Brief Nutrition Education: Survival information        GOAL:   Pt will consume >75% of meals in 4-6 days.      NUTRITION MONITORING AND EVALUATION   Previous Goal: Pt will consume >75% meals next 5-7 days   Previous Goal Met: Yes   Previous Recommendations Implemented: Yes   Cultural, Alevism, or Ethnic Dietary Needs: None   LEARNING NEEDS (Diet, Food/Nutrient-Drug Interaction):    [x] None Identified   [] Identified and Education Provided/Documented   [] Identified and Pt declined/was not appropriate      [x] Interdisciplinary Care Plan Reviewed/Documented    [x] Participated in Discharge Planning: Cardiac diet    [] Interdisciplinary Rounds     NUTRITION RISK:    [] High              [] Moderate           [x]  Low  [x]  Minimal/Uncompromised      Abdullahi Lutz RD  Pager [phone]  Weekend Pager [phone]

## 2017-05-30 NOTE — PROGRESS NOTES
Spoke with MD Clarke Lay on the phone regarding diet. Also informed of stress test results being available.  Per order reinstated pt diet, cardiac

## 2017-05-30 NOTE — PROGRESS NOTES
Attended interdisciplinary rounds on patient floor where patient care was discussed. Yolanda Carballo., M.S.   Spiritual Care Department  If needs arise please call [facility] ([phone])

## 2017-05-30 NOTE — ROUTINE PROCESS
Bedside and Verbal shift change report given to [facility] (oncoming nurse) by Janine Garcia (offgoing nurse). Report included the following information SBAR, Kardex, ED Summary, Intake/Output, MAR and Cardiac Rhythm NSR.     Zone Phone: [phone]          Significant changes during shift: prn dilaudid, zofran and benadryl administered every 4-6 hours.             Patient Information      Te Jackson  49 y.o.  5/22/2017 1:01 PM by Lydia Cavazos MD. Te Jackson was admitted from [facility]  Problem List      [facility], [facility]   Patient Active Problem List      Diagnosis Date Noted    Sickle [facility] crisis ([facility] 05/22/2017    Orbital fracture ([facility] 11/20/2016    Furuncle of axilla 07/21/2016    Depression 10/05/2014    Sickle cell anemia ([facility] 10/03/2014    Hypertension 10/03/2014    Venous insufficiency 10/03/2014    Hepatitis C 10/03/2014                  Past Medical History:   Diagnosis Date    Anemia         SC hemoglobinopathy    Chronic pain       Depression       Hypertension       Liver disease         hepatitis C                         Activity Status:      OOB to Chair Yes  Ambulated this shift Yes   Bed Rest No                DVT prophylaxis:      DVT prophylaxis Med- Yes  DVT prophylaxis SCD or SARA- No               Patient Safety:      Falls Score Total Score: 1  Safety Level_______  Bed Alarm On? Refused  Sitter? No      Plan for upcoming shift: echo. Stress test. Remain NPO.     Carter Bean      Discharge Plan: yes home when ready      Active Consults:  None

## 2017-05-30 NOTE — PROGRESS NOTES
1713: offered tylenol to patient since pain level is 3/10. Patient refused. Bedside and Verbal shift change report given to Michel Knowles (oncoming nurse) by Mariangel Shrestha (offgoing nurse). Report included the following information SBAR, Kardex, ED Summary, Intake/Output, MAR and Cardiac Rhythm NSR.     Zone Phone: [phone]          Significant changes during shift: PIV placed by [facility] team.  Becca Powell  Patient Information      Isi Johnson  49 y.o.  5/22/2017 1:01 PM by Fred Ferguson MD. Isi Johnson was admitted from Home      Problem List      [address]   Patient Active Problem List      Diagnosis Date Noted    Sickle cell crisis ([facility] 05/22/2017    Orbital fracture ([facility] 11/20/2016    Furuncle of axilla 07/21/2016    Depression 10/05/2014    Sickle cell anemia ([facility] 10/03/2014    Hypertension 10/03/2014    Venous insufficiency 10/03/2014    Hepatitis C 10/03/2014                  Past Medical History:   Diagnosis Date    Anemia         SC hemoglobinopathy    Chronic pain       Depression       Hypertension       Liver disease         hepatitis C               Core Measures:      CVA: No No  CHF:No No  PNA:No No              Activity Status:      OOB to Chair Yes  Ambulated this shift Yes   Bed Rest No      Supplemental O2: (If Applicable)      NC Yes  NRB No  Venti-mask No  On 1 Liters/min                  DVT prophylaxis:      DVT prophylaxis Med- Yes  DVT prophylaxis SCD or SARA- No               Patient Safety:      Falls Score Total Score: 1  Safety Level_______  Bed Alarm On? Refused  Sitter?  No      Plan for upcoming shift: pain management.               Discharge Plan: yes home when ready      Active Consults:

## 2017-05-30 NOTE — PROGRESS NOTES
Spiritual Care Assessment/Progress Notes    Asmita Mayorga [id number]  [id number]    1960  49 y.o.  female    Patient Telephone Number: [phone] (home)   Samaritan Affiliation:    Language: English   Extended Emergency Contact Information  Primary Emergency Contact: [place]  Mobile Phone: [phone]  Relation: Brother  Secondary Emergency Contact: [address] [place] [address]  Mobile Phone: [phone]  Relation: Daughter   Patient Active Problem List    Diagnosis Date Noted    NSVT (nonsustained ventricular tachycardia) ([facility] 05/29/2017    Sickle cell crisis ([facility] 05/22/2017    [facility] 11/20/2016    Furuncle of axilla 07/21/2016    Depression 10/05/2014    Sickle cell anemia ([facility] 10/03/2014    Hypertension 10/03/2014    Venous insufficiency 10/03/2014    Hepatitis C 10/03/2014        Date: 5/30/2017       Level of Samaritan/Spiritual Activity:  [x]         Involved in zay tradition/spiritual practice    []         Not involved in zay tradition/spiritual practice  [x]         Spiritually oriented    []         Claims no spiritual orientation    []         seeking spiritual identity  []         Feels alienated from Episcopalian practice/tradition  []         Feels angry about Episcopalian practice/tradition  [x]         Spirituality/Episcopalian tradition is a resource for coping at this time.   []         Not able to assess due to medical condition    Services Provided Today:  []         crisis intervention    []         reading Scriptures  [x]         spiritual assessment    []         prayer  [x]         empathic listening/emotional support  []         rites and rituals (cite in comments)  []         life review     []         Episcopalian support  []         theological development   []         advocacy  []         ethical dialog     []         blessing  []         bereavement support    []         support to family  [] anticipatory grief support   []         help with AMD  []         spiritual guidance    []         meditation      Spiritual Care Needs  []         Emotional Support  []         Spiritual/Voodoo Care  []         Loss/Adjustment  []         Advocacy/Referral                /Ethics  [x]         No needs expressed at               this time  []         Other: (note in               comments)  [address]  []         Follow up visits with               pt/family  []         Provide materials  []         Schedule sacraments  []         Contact Community               Clergy  [x]         Follow up as needed  []         Other: (note in               comments)     Comments: On rounds.  provided support presence as pt shared that she feels like she is doing much better now. Pt mentioned that she has been getting great support from her family, friends, and [employer]. Pt has been a member of [address] [place] [address] for 2 years now and shared that it has been a good experience there for her. No needs were expressed at this time. Advised of  availability and assured her of continued support as needed/ desired. Babs Ghotra M.S.   Spiritual Care Department  If needs arise please call [facility] ([phone])

## 2017-05-30 NOTE — CONSULTS
Cardiology Consult Note       Date of  Admission: 5/22/2017  1:01 PM     Admission type:Emergency     Subjective:     Ms. Mikie Tafoya is admitted with sickle cell crisis. She had a an episode of NSVT earlier today. Denies any chest pain, SOB. She noted previous h/o palpitations. No other cardiac history. Has chronic anemia due to her sickle cell disease. Has HTN. Does not smoke. No family h/o CAD. Her electrolytes are normal.    Patient Active Problem List    Diagnosis Date Noted    NSVT (nonsustained ventricular tachycardia) ([facility] 05/29/2017    Sickle cell crisis ([facility] 05/22/2017    Orbital fracture ([facility] 11/20/2016    Furuncle of axilla 07/21/2016    Depression 10/05/2014    Sickle cell anemia ([facility] 10/03/2014    Hypertension 10/03/2014    Venous insufficiency 10/03/2014    Hepatitis C 10/03/2014      Иван Macdonald MD  Past Medical History:   Diagnosis Date    Anemia     SC hemoglobinopathy    Chronic pain     Depression     Hypertension     Liver disease     hepatitis C      Past Surgical History:   Procedure Laterality Date    CHEST SURGERY PROCEDURE UNLISTED      status post thor for removal of a benign     HX CHOLECYSTECTOMY      HX ORTHOPAEDIC      bony curettage of an ostial myelitis focus     Allergies   Allergen Reactions    Dilaudid [Hydromorphone (Bulk)] Itching     Can tolerate with benadryl and ondansetron    Percocet [Oxycodone-Acetaminophen] Itching      History reviewed. No pertinent family history.    Current Facility-Administered Medications   Medication Dose Route Frequency    ondansetron (ZOFRAN ODT) tablet 4 mg  4 mg Oral Q6H PRN    diphenhydrAMINE (BENADRYL) capsule 50 mg  50 mg Oral Q4H PRN    HYDROmorphone (DILAUDID) tablet 2 mg  2 mg Oral Q4H PRN    famotidine (PEPCID) tablet 20 mg  20 mg Oral BID    folic acid (FOLVITE) tablet 1 mg  1 mg Oral DAILY    nitroglycerin (NITROBID) 2 % ointment 1 Inch  1 Inch Topical Q6H PRN    acetaminophen (TYLENOL) tablet 650 mg 650 mg Oral Q6H PRN    enoxaparin (LOVENOX) injection 40 mg  40 mg SubCUTAneous Q24H    valsartan (DIOVAN) tablet 80 mg  80 mg Oral DAILY         Review of Symptoms:  A comprehensive review of systems was negative except for that written in the HPI. Physical Exam    Visit Vitals    /61 (BP 1 Location: Left arm, BP Patient Position: At rest)    Pulse 85    Temp 99 °F (37.2 °C)    Resp 18    Ht 5' 10\" (1.778 m)    Wt 189 lb 13.1 oz (86.1 kg)    SpO2 100%    BMI 27.24 kg/m2     General Appearance:  Well developed, well nourished,alert and oriented x 3, and individual in no acute distress. Ears/Nose/Mouth/Throat:   Hearing grossly normal.         Neck: Supple. Chest:   Lungs clear to auscultation bilaterally. Cardiovascular:  Regular rate and rhythm, S1, S2 normal, no murmur. Abdomen:   Soft, non-tender, bowel sounds are active. Extremities: No edema bilaterally. Skin: Warm and dry. Cardiographics    Telemetry: normal sinus rhythm  ECG: normal EKG, normal sinus rhythm, unchanged from previous tracings  Echocardiogram: Not done    Labs:   Recent Results (from the past 24 hour(s))   CBC WITH AUTOMATED DIFF    Collection Time: 05/29/17  3:28 PM   Result Value Ref Range    WBC 8.8 3.6 - 11.0 K/uL    RBC 2.27 (L) 3.80 - 5.20 M/uL    HGB 7.9 (L) 11.5 - 16.0 g/dL    HCT 22.4 (L) 35.0 - 47.0 %    MCV 98.7 80.0 - 99.0 FL    MCH 34.8 (H) 26.0 - 34.0 PG    MCHC 35.3 30.0 - 36.5 g/dL    RDW 15.8 (H) 11.5 - 14.5 %    PLATELET 998 389 - 187 K/uL    NEUTROPHILS 46 32 - 75 %    LYMPHOCYTES 34 12 - 49 %    MONOCYTES 17 (H) 5 - 13 %    EOSINOPHILS 3 0 - 7 %    BASOPHILS 0 0 - 1 %    ABS. NEUTROPHILS 4.1 1.8 - 8.0 K/UL    ABS. LYMPHOCYTES 3.0 0.8 - 3.5 K/UL    ABS. MONOCYTES 1.5 (H) 0.0 - 1.0 K/UL    ABS. EOSINOPHILS 0.2 0.0 - 0.4 K/UL    ABS.  BASOPHILS 0.0 0.0 - 0.1 K/UL   LD    Collection Time: 05/29/17  3:28 PM   Result Value Ref Range     (H) 81 - 073 U/L   METABOLIC PANEL, BASIC    Collection Time: 05/29/17  3:28 PM   Result Value Ref Range    Sodium 141 136 - 145 mmol/L    Potassium 3.9 3.5 - 5.1 mmol/L    Chloride 105 97 - 108 mmol/L    CO2 30 21 - 32 mmol/L    Anion gap 6 5 - 15 mmol/L    Glucose 99 65 - 100 mg/dL    BUN 8 6 - 20 MG/DL    Creatinine 0.79 0.55 - 1.02 MG/DL    BUN/Creatinine ratio 10 (L) 12 - 20      GFR est AA >60 >60 ml/min/1.73m2    GFR est non-AA >60 >60 ml/min/1.73m2    Calcium 8.6 8.5 - 10.1 MG/DL   RETICULOCYTE COUNT    Collection Time: 05/29/17  3:28 PM   Result Value Ref Range    Reticulocyte count 9.0 (H) 0.7 - 2.1 %   MAGNESIUM    Collection Time: 05/29/17  3:28 PM   Result Value Ref Range    Magnesium 2.3 1.6 - 2.4 mg/dL        Assessment:     Assessment:       Active Problems:    Hypertension (10/3/2014)      Hepatitis C (10/3/2014)      Sickle cell crisis ([facility] (5/22/2017)      NSVT (nonsustained ventricular tachycardia) ([facility] (5/29/2017)         Plan: Active Problems:    Hypertension (10/3/2014)      Hepatitis C (10/3/2014)      Sickle cell crisis ([facility] (5/22/2017)      NSVT (nonsustained ventricular tachycardia) ([facility] (5/29/2017)- asymptomatic. ? Etiology. Will check LVEF, evaluate for ischemia. Could be secondary to anemia. Will add low dose betablocker if recurrent episodes. Thank you for the consult. Will follow.         Otliio0 Chel Nguyen MD

## 2017-05-31 PROCEDURE — 74011250637 HC RX REV CODE- 250/637: Performed by: INTERNAL MEDICINE

## 2017-05-31 PROCEDURE — 65660000000 HC RM CCU STEPDOWN

## 2017-05-31 PROCEDURE — 74011250636 HC RX REV CODE- 250/636: Performed by: INTERNAL MEDICINE

## 2017-05-31 RX ORDER — HYDROMORPHONE HYDROCHLORIDE 2 MG/ML
0.5 INJECTION, SOLUTION INTRAMUSCULAR; INTRAVENOUS; SUBCUTANEOUS
Status: DISCONTINUED | OUTPATIENT
Start: 2017-05-31 | End: 2017-06-01 | Stop reason: HOSPADM

## 2017-05-31 RX ADMIN — FAMOTIDINE 20 MG: 20 TABLET ORAL at 17:54

## 2017-05-31 RX ADMIN — ENOXAPARIN SODIUM 40 MG: 40 INJECTION SUBCUTANEOUS at 20:33

## 2017-05-31 RX ADMIN — HYDROCODONE BITARTRATE AND ACETAMINOPHEN 1 TABLET: 10; 325 TABLET ORAL at 07:46

## 2017-05-31 RX ADMIN — VALSARTAN 80 MG: 80 TABLET ORAL at 09:06

## 2017-05-31 RX ADMIN — HYDROCODONE BITARTRATE AND ACETAMINOPHEN 1 TABLET: 10; 325 TABLET ORAL at 01:03

## 2017-05-31 RX ADMIN — DIPHENHYDRAMINE HYDROCHLORIDE 50 MG: 25 CAPSULE ORAL at 20:33

## 2017-05-31 RX ADMIN — HYDROCODONE BITARTRATE AND ACETAMINOPHEN 1 TABLET: 10; 325 TABLET ORAL at 14:27

## 2017-05-31 RX ADMIN — Medication 10 ML: at 14:27

## 2017-05-31 RX ADMIN — FAMOTIDINE 20 MG: 20 TABLET ORAL at 09:06

## 2017-05-31 RX ADMIN — HYDROCODONE BITARTRATE AND ACETAMINOPHEN 1 TABLET: 10; 325 TABLET ORAL at 20:33

## 2017-05-31 RX ADMIN — FOLIC ACID 1 MG: 1 TABLET ORAL at 09:06

## 2017-05-31 RX ADMIN — DIPHENHYDRAMINE HYDROCHLORIDE 50 MG: 25 CAPSULE ORAL at 14:27

## 2017-05-31 RX ADMIN — DIPHENHYDRAMINE HYDROCHLORIDE 50 MG: 25 CAPSULE ORAL at 07:46

## 2017-05-31 NOTE — PROGRESS NOTES
Bedside and Verbal shift change report given to AUBREY FLOYD RN(oncoming nurse) by Linn Katz RN (offgoing nurse). Report included the following information SBAR, Kardex, ED Summary, Intake/Output, MAR and Cardiac Rhythm NSR.     Zone Phone: [phone]          Significant changes during shift: none          Patient Information      Elizabeth Smith  49 y.o.  5/22/2017 1:01 PM by Brenton Warren MD. Elizabeth Smith was admitted from [facility]  Problem List      [address]   Patient Active Problem List      Diagnosis Date Noted    Sickle cell crisis ([facility] 05/22/2017    Orbital fracture ([facility] 11/20/2016    Furuncle of axilla 07/21/2016    Depression 10/05/2014    Sickle cell anemia ([facility] 10/03/2014    Hypertension 10/03/2014    Venous insufficiency 10/03/2014    Hepatitis C 10/03/2014                  Past Medical History:   Diagnosis Date    Anemia         SC hemoglobinopathy    Chronic pain       Depression       Hypertension       Liver disease         hepatitis C               Core Measures:      CVA: No No  CHF:No No  PNA:No No              Activity Status:      OOB to Chair Yes  Ambulated this shift Yes   Bed Rest No      Supplemental O2: (If Applicable)      NC Yes  NRB No  Venti-mask No  On 1 Liters/min                  DVT prophylaxis:      DVT prophylaxis Med- Yes  DVT prophylaxis SCD or SARA- No               Patient Safety:      Falls Score Total Score: 1  Safety Level_______  Bed Alarm On? Refused  Sitter?  No      Plan for upcoming shift: pain management.               Discharge Plan: yes home when ready      Active Consults:

## 2017-05-31 NOTE — ROUTINE PROCESS
Bedside and Verbal shift change report given to Agueda Hwang RN(oncoming nurse) by Davis Hernandez RN (offgoing nurse). Report included the following information SBAR, Kardex, ED Summary, Intake/Output, MAR and Cardiac Rhythm NSR.     Zone Phone: [phone]          Significant changes during shift:  none          Patient Information      Azam Oneill  49 y.o.  5/22/2017 1:01 PM by Krista Delgado MD. Azam Oneill was admitted from [facility]  Problem List      [address]   Patient Active Problem List      Diagnosis Date Noted    Sickle cell crisis ([facility] 05/22/2017    Orbital fracture ([facility] 11/20/2016    Furuncle of axilla 07/21/2016    Depression 10/05/2014    Sickle cell anemia ([facility] 10/03/2014    Hypertension 10/03/2014    Venous insufficiency 10/03/2014    Hepatitis C 10/03/2014                  Past Medical History:   Diagnosis Date    Anemia         SC hemoglobinopathy    Chronic pain       Depression       Hypertension       Liver disease         hepatitis C               Core Measures:      CVA: No No  CHF:No No  PNA:No No              Activity Status:      OOB to Chair Yes  Ambulated this shift Yes   Bed Rest No      Supplemental O2: (If Applicable)      NC Yes  NRB No  Venti-mask No  On 1 Liters/min                  DVT prophylaxis:      DVT prophylaxis Med- Yes  DVT prophylaxis SCD or SARA- No               Patient Safety:      Falls Score Total Score: 1  Safety Level_______  Bed Alarm On? Refused  Sitter?  No      Plan for upcoming shift: pain management.               Discharge Plan: yes home when ready      Active Consults:

## 2017-05-31 NOTE — PROGRESS NOTES
Cardiology Progress Note      5/31/2017 2:23 PM    Admit Date: 5/22/2017    Admit Diagnosis: Sickle cell crisis (HCC)      Subjective: Hank Goins has no complaints. Visit Vitals    /75    Pulse 83    Temp 98.1 °F (36.7 °C)    Resp 18    Ht 5' 10\" (1.778 m)    Wt 189 lb 13.1 oz (86.1 kg)    SpO2 100%    BMI 27.24 kg/m2       Current Facility-Administered Medications   Medication Dose Route Frequency    HYDROmorphone (PF) (DILAUDID) injection 0.5 mg  0.5 mg IntraVENous Q6H PRN    HYDROcodone-acetaminophen (NORCO)  mg tablet 1 Tab  1 Tab Oral Q6H PRN    fentaNYL (DURAGESIC) 75 mcg/hr patch 1 Patch  1 Patch TransDERmal Q72H    sodium chloride (NS) 0.9 % flush        ondansetron (ZOFRAN ODT) tablet 4 mg  4 mg Oral Q6H PRN    diphenhydrAMINE (BENADRYL) capsule 50 mg  50 mg Oral Q4H PRN    famotidine (PEPCID) tablet 20 mg  20 mg Oral BID    folic acid (FOLVITE) tablet 1 mg  1 mg Oral DAILY    nitroglycerin (NITROBID) 2 % ointment 1 Inch  1 Inch Topical Q6H PRN    acetaminophen (TYLENOL) tablet 650 mg  650 mg Oral Q6H PRN    enoxaparin (LOVENOX) injection 40 mg  40 mg SubCUTAneous Q24H    valsartan (DIOVAN) tablet 80 mg  80 mg Oral DAILY         Objective:      Physical Exam:  Visit Vitals    /75    Pulse 83    Temp 98.1 °F (36.7 °C)    Resp 18    Ht 5' 10\" (1.778 m)    Wt 189 lb 13.1 oz (86.1 kg)    SpO2 100%    BMI 27.24 kg/m2     General Appearance:  Well developed, well nourished,alert and oriented x 3, and individual in no acute distress. Ears/Nose/Mouth/Throat:   Hearing grossly normal.         Neck: Supple. Chest:   Lungs clear to auscultation bilaterally. Cardiovascular:  Regular rate and rhythm, S1, S2 normal, no murmur. Abdomen:   Soft, non-tender, bowel sounds are active. Extremities: No edema bilaterally. Skin: Warm and dry.                Data Review:   Labs:  No results found for this or any previous visit (from the past 24 hour(s)). Telemetry: normal sinus rhythm      Assessment:     Active Problems:    Hypertension (10/3/2014)      Hepatitis C (10/3/2014)      Sickle cell crisis ([facility] (5/22/2017)      NSVT (nonsustained ventricular tachycardia) ([facility] (5/29/2017)        Plan:     Stress test, echo normal. No recurrent arrhythmia. Will see as needed. Thank you.     [address] Chel Nguyen MD

## 2017-05-31 NOTE — PROGRESS NOTES
General Daily Progress Note    Admit Date: 5/22/2017    Subjective:     Patient noted increasing pain. Current Facility-Administered Medications   Medication Dose Route Frequency    HYDROmorphone (PF) (DILAUDID) injection 0.5 mg  0.5 mg IntraVENous Q6H PRN    HYDROcodone-acetaminophen (NORCO)  mg tablet 1 Tab  1 Tab Oral Q6H PRN    fentaNYL (DURAGESIC) 75 mcg/hr patch 1 Patch  1 Patch TransDERmal Q72H    sodium chloride (NS) 0.9 % flush        ondansetron (ZOFRAN ODT) tablet 4 mg  4 mg Oral Q6H PRN    diphenhydrAMINE (BENADRYL) capsule 50 mg  50 mg Oral Q4H PRN    famotidine (PEPCID) tablet 20 mg  20 mg Oral BID    folic acid (FOLVITE) tablet 1 mg  1 mg Oral DAILY    nitroglycerin (NITROBID) 2 % ointment 1 Inch  1 Inch Topical Q6H PRN    acetaminophen (TYLENOL) tablet 650 mg  650 mg Oral Q6H PRN    enoxaparin (LOVENOX) injection 40 mg  40 mg SubCUTAneous Q24H    valsartan (DIOVAN) tablet 80 mg  80 mg Oral DAILY        Review of Systems  A comprehensive review of systems was negative.     Objective:     Patient Vitals for the past 24 hrs:   BP Temp Pulse Resp SpO2   05/31/17 0743 138/79 98.5 °F (36.9 °C) 80 18 100 %   05/31/17 0416 124/75 98.2 °F (36.8 °C) 75 18 95 %   05/30/17 2311 132/79 98.6 °F (37 °C) 78 16 100 %   05/30/17 1918 141/80 98.4 °F (36.9 °C) 84 16 100 %   05/30/17 1449 135/86 98.6 °F (37 °C) 83 16 100 %   05/30/17 1023 120/80 98.4 °F (36.9 °C) 86 18 98 %             Physical Exam:   Visit Vitals    /79    Pulse 80    Temp 98.5 °F (36.9 °C)    Resp 18    Ht 5' 10\" (1.778 m)    Wt 189 lb 13.1 oz (86.1 kg)    SpO2 100%    BMI 27.24 kg/m2     General appearance: alert, cooperative, no distress, appears stated age  Neck: supple, symmetrical, trachea midline, no adenopathy, thyroid: not enlarged, symmetric, no tenderness/mass/nodules, no carotid bruit and no JVD  Lungs: clear to auscultation bilaterally  Heart: regular rate and rhythm, S1, S2 normal, no murmur, click, rub or gallop  Abdomen: soft, non-tender. Bowel sounds normal. No masses,  no organomegaly  Extremities: extremities normal, atraumatic, no cyanosis or edema  Neurologic: Grossly normal        Data Review   Recent Results (from the past 24 hour(s))   NUCLEAR STRESS TEST    Collection Time: 05/30/17 12:23 PM   Result Value Ref Range    Diagnosis       Nondiagnostic stress test  Cardiolite Images to follow    Confirmed by MARU Briscoe ([id number]),  Jackson Peres ([id number]) on   5/30/2017 1:14:34 PM      Test indication Arrhythmias     Functional capacity      ECG Interp. Before Exercise Normal     ECG Interp. During Exercise      Overall HR response to exercise      Overall BP response to exercise      Max. Systolic  mmHg    Max. Diastolic BP 71 mmHg    Max. Heart rate 97 BPM    [facility] treadmill score 5456     Maxwell TM score result      Peak Ex METs 1.0 METS    Protocol name Tammi Delgado cardiac condition      Attending Angel Gil M.D., P.V. Assessment:     Active Problems:    Hypertension (10/3/2014)      Hepatitis C (10/3/2014)      Sickle cell crisis ([facility] (5/22/2017)      NSVT (nonsustained ventricular tachycardia) ([facility] (5/29/2017)        Plan: 1. Will observe to insure no recurrence of crisis. 2.Cardiac workup neg. 3.Plan d/c tomorrow if all goes well.

## 2017-05-31 NOTE — PROGRESS NOTES
CM reviewed chart and will continue to follow pt for discharge planning needs.      Emma Ayala, [address]

## 2017-06-01 VITALS
BODY MASS INDEX: 27.17 KG/M2 | TEMPERATURE: 98.6 F | HEIGHT: 70 IN | OXYGEN SATURATION: 99 % | WEIGHT: 189.82 LBS | DIASTOLIC BLOOD PRESSURE: 73 MMHG | RESPIRATION RATE: 16 BRPM | SYSTOLIC BLOOD PRESSURE: 129 MMHG | HEART RATE: 79 BPM

## 2017-06-01 PROCEDURE — 74011250637 HC RX REV CODE- 250/637: Performed by: INTERNAL MEDICINE

## 2017-06-01 RX ADMIN — DIPHENHYDRAMINE HYDROCHLORIDE 50 MG: 25 CAPSULE ORAL at 08:45

## 2017-06-01 RX ADMIN — HYDROCODONE BITARTRATE AND ACETAMINOPHEN 1 TABLET: 10; 325 TABLET ORAL at 08:45

## 2017-06-01 RX ADMIN — FAMOTIDINE 20 MG: 20 TABLET ORAL at 08:45

## 2017-06-01 RX ADMIN — VALSARTAN 80 MG: 80 TABLET ORAL at 08:45

## 2017-06-01 RX ADMIN — FOLIC ACID 1 MG: 1 TABLET ORAL at 08:44

## 2017-06-01 RX ADMIN — DIPHENHYDRAMINE HYDROCHLORIDE 50 MG: 25 CAPSULE ORAL at 02:23

## 2017-06-01 RX ADMIN — HYDROCODONE BITARTRATE AND ACETAMINOPHEN 1 TABLET: 10; 325 TABLET ORAL at 02:23

## 2017-06-01 NOTE — PROGRESS NOTES
CM met with pt and she was in good spirits ready to go home. Family at bedside and will transport pt home. 2nd IM Medicare letter given to pt and f/u appointment made and documented on the discharge paperwork. Care Management Interventions  PCP Verified by CM: Yes (Dr. Linda Roblero)  Mode of Transport at Discharge: Other (see comment) (family transporting pt home by car)  Hospital Transport Time of Discharge: 1000  Transition of Care Consult (CM Consult): Discharge Planning  Discharge Durable Medical Equipment: No  Physical Therapy Consult: No  Occupational Therapy Consult: No  Speech Therapy Consult: No  Current Support Network:  Other, Own Home (pt's daughter lives with pt in a 1 story home with a couple steps to the entrance)  Confirm Follow Up Transport: Family  Plan discussed with Pt/Family/Caregiver: Yes  Discharge Location  Dis[facility] Pl[facility] [address]  [facility] [address]

## 2017-06-01 NOTE — DISCHARGE INSTRUCTIONS
General Discharge Instructions    Patient ID:  Guille Mckee  [id number]  49 y.o.  1960    Patient Instructions        The following personal items were collected during your admission and were returned to you. Take Home Medications           What to do at Home    Recommended diet: Regular Diet    Recommended activity: Activity as tolerated    Follow-up with Maurice Doty MD  in 1 week. Information obtained by :  I understand that if any problems occur once I am at home I am to contact my physician. I understand and acknowledge receipt of the instructions indicated above.                                                                                                                                            Physician's or R.N.'s Signature                                                                  Date/Time                                                                                                                                              Patient or Representative Signature                                                          Date/Time

## 2017-06-01 NOTE — DISCHARGE SUMMARY
[facility] Mariau, [facility] SUMMARY       Name:  Olga Mendoza   MR#:  [id number]   :  1960   Account #:  [de-identified]        Date of Adm:  2017       HISTORY OF PRESENT ILLNESS: The patient is a 81-year-old lady   who has SC hemoglobinopathy who presented to the emergency room   complaining of a 2-day history of increasing generalized pain. She was   treated aggressively in the emergency room with no meaningful   improvement and was subsequently admitted for sickle cell crisis. PAST MEDICAL HISTORY, SOCIAL HISTORY, REVIEW OF   SYSTEMS, FAMILY HISTORY, PHYSICAL EXAMINATION: As in   admitting H and P.    LABORATORY VALUES: Initial hemoglobin was 8.8, white count 6.8,   MCV was 95.8, platelet count 257,482 with the following differential 50   segs, 36 lymphocytes, 14 monocytes. At time of discharge,   hemoglobin was 7.9. Elevation of AST is 61. Urinalysis was normal.    RADIOGRAPHS: Chest x-ray normal.    CONSULTATIONS: Obtained with Cardiology and their comments will   be elaborated on in the hospital course. HOSPITAL COURSE: The patient was admitted and placed on   parenteral analgesics and fluids. With this, her crisis gradually   improved. She progressed to the point of being back to her baseline   status, but then developed asymptomatic nonsustained V-tach of about   8 to 10 beats. Consultation with Cardiology was obtained, specifically   Dr. Abdon Lopez. She had an echocardiogram which was normal, as well   as a stress test which was normal. She did not require any further   treatment and it was felt could be discharged home. FINAL DIAGNOSES   1. Painful sickle cell crises. 2. Sickle cell hemoglobinopathy. 3. Primary hypertension. 4. Depression. 5. Status post cholecystectomy. 6. Short run of V-tach and negative cardiac workup.       DISPOSITION: The patient will be discharged home ambulatory on a regular diet. DISCHARGE MEDICATIONS INCLUDE THE FOLLOWING   1. Fentanyl patch 75 mcg H.18Y.   2. Folic acid 1 mg q.a.m. 3. Hydrocodone/APAP 10/325 q.6h. p.r.n.   4. Losartan/hydrochlorothiazide 15/12.5 one q.a.m.   5. Metoclopramide 10 mg 30 minutes a.c. and at bedtime. 6. Omeprazole 20 mg daily p.r.n. FOLLOWUP: She will return to the office in the next week. She remains a FULL CODE.        MD Analilia Sanchez / Oren Tamez   D:  06/01/2017   08:54   T:  06/01/2017   11:05   Job #:  [id number]

## 2017-06-01 NOTE — PROGRESS NOTES
Patient discharged home. IV was removed. Discharge instructions were given and there was opportunities for questions.

## 2017-06-05 ENCOUNTER — PATIENT OUTREACH (OUTPATIENT)
Dept: INTERNAL MEDICINE CLINIC | Age: 57
End: 2017-06-05

## 2017-06-05 NOTE — LETTER
6/5/2017 2:35 PM 
 
[facility] [id number] Dear Ms. Mart Lopez am the R.N. Nurse Navigator working with Dr. Vinita Deras. We are glad to hear you are home and hope you are feeling much better. Part of my job is to follow up with patients who have been to the hospital to see how they are feeling, answer any questions they may have about their visit and also make sure they have a follow-up appointment to see their primary care doctor. I have been unable to reach you by telephone and wanted to make sure that you scheduled a follow-up appointment to come in and talk to Dr. Vinita Deras about your recent visit to the hospital.  Please call our office to schedule your appointment at the number above. In the meantime, if you have any questions or concerns, please feel free to call me on my direct line at [phone]. Thank you for allowing us to provide you with excellent care. Our goal is to address all of your concerns and needs. We strive to provide excellent quality care our Medical Practice here at [facility]. We strive to be rated as excellent, as anything less than excellent does not meet our expectations. Thank you again for choosing us for your continued health care needs. Sincerely, Maki Beck RN

## 2017-06-05 NOTE — PROGRESS NOTES
NNTOCIP    Patient on discharge report dated 6/12017. NNTOCIP:  MRM 5/22-6/1/2017:  Sickle Cell  Pain Crisi . Left message on voicemail. Will attempt to contact again. Need to complete post-discharge assessment. 61-year-old lady   who has SC hemoglobinopathy who presented to the emergency room   complaining of a 2-day history of increasing generalized pain. She was   treated aggressively in the emergency room with no meaningful   improvement and was subsequently admitted for sickle cell crisis.     She progressed to the point of being back to her baseline   status, but then developed asymptomatic nonsustained V-tach of about   8 to 10 beats. Consultation with Cardiology was obtained, specifically   Dr. Jovi Garcia. She had an echocardiogram which was normal, as well   as a stress test which was normal. She did not require any further   treatment and it was felt could be discharged home.     FINAL DIAGNOSES   1. Painful sickle cell crises. 2. Sickle cell hemoglobinopathy. 3. Primary hypertension. 4. Depression. 5. Status post cholecystectomy. 6. Short run of V-tach and negative cardiac workup.       DISPOSITION: The patient will be discharged home ambulatory on a   regular diet.       DISCHARGE MEDICATIONS INCLUDE THE FOLLOWING   1. Fentanyl patch 75 mcg U.93E.   2. Folic acid 1 mg q.a.m. 3. Hydrocodone/APAP 10/325 q.6h. p.r.n.   4. Losartan/hydrochlorothiazide 15/12.5 one q.a.m.   5. Metoclopramide 10 mg 30 minutes a.c. and at bedtime. 6. Omeprazole 20 mg daily p.r.n.

## 2017-06-12 ENCOUNTER — PATIENT OUTREACH (OUTPATIENT)
Dept: INTERNAL MEDICINE CLINIC | Age: 57
End: 2017-06-12

## 2017-06-12 NOTE — PROGRESS NOTES
2nd f/u contact within 30 days:  NNTOCIP [facility]:  5/22/2017-6/1/2017:  Sickle Cell Crisis      HISTORY OF PRESENT ILLNESS: The patient is a 61-year-old lady   who has SC hemoglobinopathy who presented to the emergency room   complaining of a 2-day history of increasing generalized pain. She was   treated aggressively in the emergency room with no meaningful   improvement and was subsequently admitted for sickle cell crisis. LABORATORY VALUES: Initial hemoglobin was 8.8, white count 6.8,   MCV was 95.8, platelet count 055,982 with the following differential 50   segs, 36 lymphocytes, 14 monocytes. At time of discharge,   hemoglobin was 7.9. Elevation of AST is 61. Urinalysis was normal.    FINAL DIAGNOSES   1. Painful sickle cell crises. 2. Sickle cell hemoglobinopathy. 3. Primary hypertension. 4. Depression. 5. Status post cholecystectomy.    6. Short run of V-tach and negative cardiac workup.

## 2017-06-13 ENCOUNTER — OFFICE VISIT (OUTPATIENT)
Dept: INTERNAL MEDICINE CLINIC | Age: 57
End: 2017-06-13

## 2017-06-13 VITALS
DIASTOLIC BLOOD PRESSURE: 61 MMHG | HEART RATE: 94 BPM | RESPIRATION RATE: 18 BRPM | OXYGEN SATURATION: 95 % | WEIGHT: 193.1 LBS | SYSTOLIC BLOOD PRESSURE: 115 MMHG | HEIGHT: 70 IN | BODY MASS INDEX: 27.64 KG/M2 | TEMPERATURE: 98.9 F

## 2017-06-13 DIAGNOSIS — D57.00 SICKLE CELL CRISIS (HCC): Primary | ICD-10-CM

## 2017-06-13 DIAGNOSIS — D57.00 HB-SS DISEASE WITH CRISIS (HCC): ICD-10-CM

## 2017-06-13 DIAGNOSIS — I10 ESSENTIAL HYPERTENSION: ICD-10-CM

## 2017-06-13 DIAGNOSIS — F32.A DEPRESSION, UNSPECIFIED DEPRESSION TYPE: ICD-10-CM

## 2017-06-13 RX ORDER — HYDROCODONE BITARTRATE AND ACETAMINOPHEN 10; 325 MG/1; MG/1
1 TABLET ORAL
Qty: 120 TAB | Refills: 0 | Status: SHIPPED | OUTPATIENT
Start: 2017-06-13 | End: 2017-07-14 | Stop reason: SDUPTHER

## 2017-06-13 NOTE — MR AVS SNAPSHOT
Visit Information Date & Time Provider Department Dept. Phone Encounter #  
 6/13/2017 12:45 PM Yudith London [facility] [phone] [id number] Your Appointments 7/13/2017 11:45 AM  
Any with Janay Gonsalez MD  
[address] [place] [address] and Primary Care [facility]) Appt Note: 4 MONTH FOLLOW UP  
 [address] [facility]  
  
   
 [address] [id number] Upcoming Health Maintenance Date Due FOBT Q 1 YEAR AGE 50-75 4/2/2016 Pneumococcal 19-64 Highest Risk (2 of 3 - PCV13) 5/17/2017 BREAST CANCER SCRN MAMMOGRAM 9/17/2017 INFLUENZA AGE 9 TO ADULT 8/1/2017 PAP AKA CERVICAL CYTOLOGY 4/2/2018 DTaP/Tdap/Td series (2 - Td) 5/17/2026 Allergies as of 6/13/2017  Review Complete On: 6/13/2017 By: [place] Severity Noted Reaction Type Reactions Dilaudid [Hydromorphone (Bulk)]  10/03/2014    Itching Can tolerate with benadryl and ondansetron Percocet [Oxycodone-acetaminophen]  10/03/2014    Itching Current Immunizations  Reviewed on 5/22/2017 No immunizations on file. Not reviewed this visit You Were Diagnosed With   
  
 Codes Comments Sickle cell crisis ([facility]    -  Primary ICD-10-CM: D57.00 ICD-9-CM: 282.62 Hb-SS disease with crisis [facility])     ICD-10-CM: D57.00 ICD-9-CM: 282.62 Essential hypertension     ICD-10-CM: I10 
ICD-9-CM: 401.9 Depression, unspecified depression type     ICD-10-CM: F32.9 ICD-9-CM: 843 Vitals BP Pulse Temp Resp Height(growth percentile) Weight(growth percentile)  
 115/61 (BP 1 Location: Left arm, BP Patient Position: Sitting) 94 98.9 °F (37.2 °C) (Oral) 18 5' 10\" (1.778 m) 193 lb 1.6 oz (87.6 kg) SpO2 BMI OB Status Smoking Status 95% 27.71 kg/m2 Postmenopausal Never Smoker Vitals History BMI and BSA Data Body Mass Index Body Surface Area 27.71 kg/m 2 2.08 m 2 Preferred Pharmacy Pharmacy Name Phone [address] [phone] Your Updated Medication List  
  
   
This list is accurate as of: 6/13/17  2:41 PM.  Always use your most recent med list.  
  
  
  
  
 fentaNYL 75 mcg/hr Commonly known as:  DURAGESIC  
1 Patch by TransDERmal route every seventy-two (72) hours. Max Daily Amount: 1 Patch. fluocinoNIDE 0.05 % topical cream  
Commonly known as:  LIDEX  
two (2) times daily as needed. folic acid 1 mg tablet Commonly known as:  FOLVITE  
TAKE 1 TABLET BY MOUTH EVERY DAY  
  
 HYDROcodone-acetaminophen  mg tablet Commonly known as:  [place] Take 1 Tab by mouth every six (6) hours as needed for Pain. Max Daily Amount: 4 Tabs. losartan-hydroCHLOROthiazide 50-12.5 mg per tablet Commonly known as:  HYZAAR  
TAKE 1 TABLET BY MOUTH DAILY  
  
 metoclopramide HCl 10 mg tablet Commonly known as:  REGLAN Take 10 mg by mouth four (4) times daily as needed. OMEPRAZOLE (BULK) Take 20 mg by mouth daily. 20 mg, PO, daily, 0 Refills Prescriptions Printed Refills HYDROcodone-acetaminophen (NORCO)  mg tablet 0 Sig: Take 1 Tab by mouth every six (6) hours as needed for Pain. Max Daily Amount: 4 Tabs. Class: Print Route: Oral  
  
Introducing [facility]! [employer] introduces Launch[employer] patient portal. Now you can access parts of your medical record, email your doctor's office, and request medication refills online. 1. In your internet browser, go to [email or web address] "[email or web address][email or web address] 2. Click on the First Time User? Click Here link in the Sign In box. You will see the New Member Sign Up page. 3. Enter your LaunchG[employer] Access Code exactly as it appears below. You will not need to use this code after youve completed the sign-up process.  If you do not sign up before the expiration date, you must request a new code. · Equals[employer] Access Code: [email or web address] Expires: 9/11/2017  2:41 PM 
 
4. Enter the last four digits of your Social Security Number (xxxx) and Date of Birth (mm/dd/yyyy) as indicated and click Submit. You will be taken to the next sign-up page. 5. Create a Equals[employer] ID. This will be your [employer] login ID and cannot be changed, so think of one that is secure and easy to remember. 6. Create a [employer] password. You can change your password at any time. 7. Enter your Password Reset Question and Answer. This can be used at a later time if you forget your password. 8. Enter your e-mail address. You will receive e-mail notification when new information is available in [address] 9. Click Sign Up. You can now view and download portions of your medical record. 10. Click the Download Summary menu link to download a portable copy of your medical information. If you have questions, please visit the Frequently Asked Questions section of the [employer] website. Remember, Equ[employer] is NOT to be used for urgent needs. For medical emergencies, dial 911. Now available from your iPhone and Android! Please provide this summary of care documentation to your next provider. Your primary care clinician is listed as Ezequiel Lynne. If you have any questions after today's visit, please call [phone].

## 2017-06-13 NOTE — PROGRESS NOTES
Chief Complaint   Patient presents with   [facility] Follow Up     sickle cell crisis on 5/22/17   . SUBECTIVE:    Brittney Paulson is a 49 y.o. female comes in for return visit stating that she has done fairly well. She was most recently hospitalized for painful sickle cell crisis. It was relatively uncomplicated. She did have a short run of unsustained v-tach. She had a stress test and an echocardiogram and both were negative. No treatment was given. She remains on her antihypertensive medication as prescribed. She is quite functional.          Current Outpatient Prescriptions   Medication Sig Dispense Refill    HYDROcodone-acetaminophen (NORCO)  mg tablet Take 1 Tab by mouth every six (6) hours as needed for Pain. Max Daily Amount: 4 Tabs. 120 Tab 0    fentaNYL (DURAGESIC) 75 mcg/hr 1 Patch by TransDERmal route every seventy-two (72) hours. Max Daily Amount: 1 Patch. 10 Patch 0    metoclopramide HCl (REGLAN) 10 mg tablet Take 10 mg by mouth four (4) times daily as needed.  folic acid (FOLVITE) 1 mg tablet TAKE 1 TABLET BY MOUTH EVERY DAY 90 Tab 3    losartan-hydrochlorothiazide (HYZAAR) 50-12.5 mg per tablet TAKE 1 TABLET BY MOUTH DAILY 90 Tab 3    OMEPRAZOLE, BULK, Take 20 mg by mouth daily. 20 mg, PO, daily, 0 Refills      fluocinoNIDE (LIDEX) 0.05 % topical cream two (2) times daily as needed.        Past Medical History:   Diagnosis Date    Anemia     SC hemoglobinopathy    Chronic pain     Depression     Hypertension     Liver disease     hepatitis C     Past Surgical History:   Procedure Laterality Date    CHEST SURGERY PROCEDURE UNLISTED      status post thor for removal of a benign     HX CHOLECYSTECTOMY      HX ORTHOPAEDIC      bony curettage of an ostial myelitis focus     Allergies   Allergen Reactions    Dilaudid [Hydromorphone (Bulk)] Itching     Can tolerate with benadryl and ondansetron    Percocet [Oxycodone-Acetaminophen] Itching       REVIEW OF SYSTEMS:  Review of Systems - Negative except   ENT ROS: negative for - headaches, hearing change, nasal congestion, oral lesions, tinnitus, visual changes or   Respiratory ROS: no cough, shortness of breath, or wheezing  Cardiovascular ROS: no chest pain or dyspnea on exertion  Gastrointestinal ROS: no abdominal pain, change in bowel habits, or black or blood  Genito-Urinary ROS: no dysuria, trouble voiding, or hematuria  Musculoskeletal ROS: negative  Neurological ROS: no TIA or stroke symptoms      Social History     Social History    Marital status:      Spouse name: N/A    Number of children: 2    Years of education: N/A     Occupational History    disabled---Sickle cell disease      Social History Main Topics    Smoking status: Never Smoker    Smokeless tobacco: Never Used    Alcohol use No    Drug use: No    Sexual activity: Yes     Partners: Male     Other Topics Concern    None     Social History Narrative   r  History reviewed. No pertinent family history. OBJECTIVE:  Visit Vitals    /61 (BP 1 Location: Left arm, BP Patient Position: Sitting)    Pulse 94    Temp 98.9 °F (37.2 °C) (Oral)    Resp 18    Ht 5' 10\" (1.778 m)    Wt 193 lb 1.6 oz (87.6 kg)    SpO2 95%    BMI 27.71 kg/m2     ENT: perrla,  eom intact  NECK: supple. Thyroid normal, no JVD  CHEST: clear to ascultation and percussion   HEART: regular rate and rhythm  ABD: soft, bowel sounds active,   EXTREMITIES: no edema, pulse 1+  INTEGUMENT: clear      ASSESSMENT:  1. Sickle cell crisis ([facility]    2. Hb-SS disease with crisis ([facility]    3. Essential hypertension    4. Depression, unspecified depression type        PLAN:    1. The patient's sickle cell disease appears to be doing well. She will continue her analgesics as needed which includes Hydrocodone and Duragesic. 2. Blood pressure is excellent today. Depression is quite stable.       .  Orders Placed This Encounter    HYDROcodone-acetaminophen ([place]a [place])  mg tablet       Follow-up Disposition:  Return in about 3 months (around 9/13/2017).       Michelle Hugo MD

## 2017-06-13 NOTE — PROGRESS NOTES
Chief Complaint   Patient presents with   [facility] Follow Up     sickle cell crisis on 5/22/17   1. Have you been to the ER, urgent care clinic since your last visit? Hospitalized since your last visit? Yes Reason for visit: sickle cell crisis on 5/22/17    2. Have you seen or consulted any other health care providers outside of the [facility] since your last visit? Include any pap smears or colon screening. No     verified and approved per Dr. Sydnee Torres.

## 2017-07-16 RX ORDER — FENTANYL 75 UG/H
1 PATCH TRANSDERMAL
Qty: 10 PATCH | Refills: 0 | Status: SHIPPED | OUTPATIENT
Start: 2017-07-16 | End: 2017-09-07 | Stop reason: SDUPTHER

## 2017-07-16 RX ORDER — HYDROCODONE BITARTRATE AND ACETAMINOPHEN 10; 325 MG/1; MG/1
1 TABLET ORAL
Qty: 120 TAB | Refills: 0 | Status: SHIPPED | OUTPATIENT
Start: 2017-07-16 | End: 2017-09-20 | Stop reason: SDUPTHER

## 2017-07-31 ENCOUNTER — OFFICE VISIT (OUTPATIENT)
Dept: INTERNAL MEDICINE CLINIC | Age: 57
End: 2017-07-31

## 2017-07-31 VITALS
WEIGHT: 194 LBS | RESPIRATION RATE: 16 BRPM | BODY MASS INDEX: 27.77 KG/M2 | DIASTOLIC BLOOD PRESSURE: 62 MMHG | TEMPERATURE: 98.7 F | HEART RATE: 83 BPM | HEIGHT: 70 IN | OXYGEN SATURATION: 95 % | SYSTOLIC BLOOD PRESSURE: 97 MMHG

## 2017-07-31 DIAGNOSIS — F32.A DEPRESSION, UNSPECIFIED DEPRESSION TYPE: ICD-10-CM

## 2017-07-31 DIAGNOSIS — I10 ESSENTIAL HYPERTENSION: ICD-10-CM

## 2017-07-31 DIAGNOSIS — D57.00 HB-SS DISEASE WITH CRISIS (HCC): ICD-10-CM

## 2017-07-31 DIAGNOSIS — G56.01 CARPAL TUNNEL SYNDROME OF RIGHT WRIST: Primary | ICD-10-CM

## 2017-07-31 DIAGNOSIS — H60.502 ACUTE OTITIS EXTERNA OF LEFT EAR, UNSPECIFIED TYPE: ICD-10-CM

## 2017-07-31 RX ORDER — NEOMYCIN SULFATE, POLYMYXIN B SULFATE AND HYDROCORTISONE 10; 3.5; 1 MG/ML; MG/ML; [USP'U]/ML
3 SUSPENSION/ DROPS AURICULAR (OTIC) 4 TIMES DAILY
Qty: 15 ML | Refills: 0 | Status: SHIPPED | OUTPATIENT
Start: 2017-07-31 | End: 2017-08-10

## 2017-07-31 NOTE — PROGRESS NOTES
Chief Complaint   Patient presents with    Ear Pain     L EAR PAIN X3 WKS, R WRIST PAIN     1. Have you been to the ER, urgent care clinic since your last visit? Hospitalized since your last visit? No    2. Have you seen or consulted any other health care providers outside of the [facility] since your last visit? Include any pap smears or colon screening.  No

## 2017-07-31 NOTE — PROGRESS NOTES
[facility]  [place] [address]  [address] [place] [address]  Phone:  [phone]  Fax: [phone]       Chief Complaint   Patient presents with    Ear Pain     L EAR PAIN X3 WKS, R WRIST PAIN   . SUBJECTIVE:    Willard Barrios is a 49 y.o. female Comes in for return visit with several complaints. First complaint is that of irritation of the left ear. It has been present now for the last several weeks. She additionally has paresthesia and numbness of her right hand, which is unbearable. Her sickle cell disease has been doing quite well. She has had no major crises. She has a past history of primary hypertension and depression, the latter of which is doing quite well. Current Outpatient Prescriptions   Medication Sig Dispense Refill    neomycin-polymyxin-hydrocortisone, buffered, (PEDIOTIC) 3.5-10,000-1 mg/mL-unit/mL-% otic suspension Administer 3 Drops in left ear four (4) times daily for 10 days. 15 mL 0    citalopram (CELEXA) 10 mg tablet TAKE 1 TABLET BY MOUTH EVERY NIGHT AT BEDTIME 90 Tab 3    fentaNYL (DURAGESIC) 75 mcg/hr 1 Patch by TransDERmal route every seventy-two (72) hours. Max Daily Amount: 1 Patch. 10 Patch 0    HYDROcodone-acetaminophen (NORCO)  mg tablet Take 1 Tab by mouth every six (6) hours as needed for Pain. Max Daily Amount: 4 Tabs. 271 Tab 0    folic acid (FOLVITE) 1 mg tablet TAKE 1 TABLET BY MOUTH EVERY DAY 90 Tab 3    losartan-hydrochlorothiazide (HYZAAR) 50-12.5 mg per tablet TAKE 1 TABLET BY MOUTH DAILY 90 Tab 3    OMEPRAZOLE, BULK, Take 20 mg by mouth daily. 20 mg, PO, daily, 0 Refills      fluocinoNIDE (LIDEX) 0.05 % topical cream two (2) times daily as needed.        Past Medical History:   Diagnosis Date    Anemia     SC hemoglobinopathy    Chronic pain     Depression     Hypertension     Liver disease     hepatitis C     Past Surgical History:   Procedure Laterality Date    CHEST SURGERY PROCEDURE UNLISTED      status post thor for removal of a benign     HX CHOLECYSTECTOMY      HX ORTHOPAEDIC      bony curettage of an ostial myelitis focus     Allergies   Allergen Reactions    Dilaudid [Hydromorphone (Bulk)] Itching     Can tolerate with benadryl and ondansetron    Percocet [Oxycodone-Acetaminophen] Itching         REVIEW OF SYSTEMS:  General: negative for - chills or fever  ENT: negative for - headaches, nasal congestion or tinnitus  Respiratory: negative for - cough, hemoptysis, shortness of breath or wheezing  Cardiovascular : negative for - chest pain, edema, palpitations or shortness of breath  Gastrointestinal: negative for - abdominal pain, blood in stools, heartburn or nausea/vomiting  Genito-Urinary: no dysuria, trouble voiding, or hematuria  Musculoskeletal: negative for - gait disturbance, joint pain, joint stiffness or joint swelling  Neurological: no TIA or stroke symptoms  Hematologic: no bruises, no bleeding, no swollen glands  Integument: no lumps, mole changes, nail changes or rash  Endocrine: no malaise/lethargy or unexpected weight changes      Social History     Social History    Marital status:      Spouse name: N/A    Number of children: 2    Years of education: N/A     Occupational History    disabled---Sickle cell disease      Social History Main Topics    Smoking status: Never Smoker    Smokeless tobacco: Never Used    Alcohol use No    Drug use: No    Sexual activity: Yes     Partners: Male     Other Topics Concern    None     Social History Narrative     History reviewed. No pertinent family history.     OBJECTIVE:    Visit Vitals    BP 97/62 (BP 1 Location: Left arm, BP Patient Position: Sitting)    Pulse 83    Temp 98.7 °F (37.1 °C) (Oral)    Resp 16    Ht 5' 10\" (1.778 m)    Wt 194 lb (88 kg)    SpO2 95%    BMI 27.84 kg/m2     CONSTITUTIONAL: well , well nourished, appears age appropriate  EYES: perrla, eom intact  ENMT:moist mucous membranes, pharynx clear  NECK: supple. Thyroid normal  RESPIRATORY: Chest: clear to ascultation and percussion   CARDIOVASCULAR: Heart: regular rate and rhythm  GASTROINTESTINAL: Abdomen: soft, bowel sounds active  HEMATOLOGIC: no pathological lymph nodes palpated  MUSCULOSKELETAL: Extremities: no edema, pulse 1+   INTEGUMENT: No unusual rashes or suspicious skin lesions noted. Nails appear normal.  NEUROLOGIC: non-focal exam   MENTAL STATUS: alert and oriented, appropriate affect      ASSESSMENT:  1. Carpal tunnel syndrome of right wrist    2. Acute otitis externa of left ear, unspecified type    3. Hb-SS disease with crisis ([address]    4. Essential hypertension    5. Depression, unspecified depression type        PLAN:    1. The patient has carpal tunnel syndrome in the right hand. Under sterile technique I inject 20 mg Kenalog in the tiffany nerve area. She tolerates the procedure well. Injection was given on the ventral aspect in the area of the wrist.    2. She has an otitis externa of the left ear and will be given Cortisporin Otic Suspension. 3. Her sickle cell disease appears to be reasonably stable at this point. 4. Blood pressure is excellent. 5. Her depression is quite stable. .  Orders Placed This Encounter    neomycin-polymyxin-hydrocortisone, buffered, (PEDIOTIC) 3.5-10,000-1 mg/mL-unit/mL-% otic suspension    triamcinolone acetonide (KENALOG) 10 mg/mL injection         Follow-up Disposition:  Return in about 2 months (around 9/30/2017).       Renny Johnson MD

## 2017-07-31 NOTE — MR AVS SNAPSHOT
Visit Information Date & Time Provider Department Dept. Phone Encounter #  
 7/31/2017 10:30 AM Bakari Rao MD [facility] and [place] [address] [id number] Follow-up Instructions Return in about 2 months (around 9/30/2017). Your Appointments 9/26/2017 12:45 PM  
Any with Bakari Rao MD  
[address] [place] [address] and Primary Care [address] [place] [address]) Appt Note: 3 month f/u  
 [address] [facility]  
  
   
 [id number] Upcoming Health Maintenance Date Due Pneumococcal 19-64 Highest Risk (2 of 3 - PCV13) 5/17/2017 BREAST CANCER SCRN MAMMOGRAM 9/17/2017 FOBT Q 1 YEAR AGE 50-75 7/31/2020* INFLUENZA AGE 9 TO ADULT 8/1/2017 PAP AKA CERVICAL CYTOLOGY 4/2/2018 DTaP/Tdap/Td series (2 - Td) 5/17/2026 *Topic was postponed. The date shown is not the original due date. Allergies as of 7/31/2017  Review Complete On: 7/31/2017 By: Elvia Delgado Severity Noted Reaction Type Reactions Dilaudid [Hydromorphone (Bulk)]  10/03/2014    Itching Can tolerate with benadryl and ondansetron Percocet [Oxycodone-acetaminophen]  10/03/2014    Itching Current Immunizations  Reviewed on 5/22/2017 No immunizations on file. Not reviewed this visit You Were Diagnosed With   
  
 Codes Comments Carpal tunnel syndrome of right wrist    -  Primary ICD-10-CM: G56.01 
ICD-9-CM: 354.0 Acute otitis externa of left ear, unspecified type     ICD-10-CM: H60.502 ICD-9-CM: 380.10 Hb-SS disease with crisis [facility])     ICD-10-CM: D57.00 ICD-9-CM: 282.62 Essential hypertension     ICD-10-CM: I10 
ICD-9-CM: 401.9 Depression, unspecified depression type     ICD-10-CM: F32.9 ICD-9-CM: 771 Vitals BP Pulse Temp Resp Height(growth percentile) Weight(growth percentile)  97/62 (BP 1 Location: Left arm, BP Patient Position: Sitting) 83 98.7 °F (37.1 °C) (Oral) 16 5' 10\" (1.778 m) 194 lb (88 kg) SpO2 BMI OB Status Smoking Status 95% 27.84 kg/m2 Postmenopausal Never Smoker BMI and BSA Data Body Mass Index Body Surface Area  
 27.84 kg/m 2 2.08 m 2 Preferred Pharmacy Pharmacy Name Phone [id number] [phone] Your Updated Medication List  
  
   
This list is accurate as of: 7/31/17 12:07 PM.  Always use your most recent med list.  
  
  
  
  
 citalopram 10 mg tablet Commonly known as:  CELEXA  
TAKE 1 TABLET BY MOUTH EVERY NIGHT AT BEDTIME  
  
 fentaNYL 75 mcg/hr Commonly known as:  DURAGESIC  
1 Patch by TransDERmal route every seventy-two (72) hours. Max Daily Amount: 1 Patch. fluocinoNIDE 0.05 % topical cream  
Commonly known as:  LIDEX  
two (2) times daily as needed. folic acid 1 mg tablet Commonly known as:  FOLVITE  
TAKE 1 TABLET BY MOUTH EVERY DAY  
  
 HYDROcodone-acetaminophen  mg tablet Commonly known as:  Ruffus Homme Take 1 Tab by mouth every six (6) hours as needed for Pain. Max Daily Amount: 4 Tabs. losartan-hydroCHLOROthiazide 50-12.5 mg per tablet Commonly known as:  HYZAAR  
TAKE 1 TABLET BY MOUTH DAILY  
  
 neomycin-polymyxin-hydrocortisone (buffered) 3.5-10,000-1 mg/mL-unit/mL-% otic suspension Commonly known as:  Hinds Iris Administer 3 Drops in left ear four (4) times daily for 10 days. OMEPRAZOLE (BULK) Take 20 mg by mouth daily. 20 mg, PO, daily, 0 Refills  
  
 triamcinolone acetonide 10 mg/mL injection Commonly known as:  KENALOG 2 mL by IntraMUSCular route once for 1 dose. Prescriptions Sent to Pharmacy Refills  
 neomycin-polymyxin-hydrocortisone, buffered, (PEDIOTIC) 3.5-10,000-1 mg/mL-unit/mL-% otic suspension 0 Sig: Administer 3 Drops in left ear four (4) times daily for 10 days.   
 Class: Normal  
 Pharmacy: [id number] [address] #: [phone] Route: Left Ear We Performed the Following TRIAMCINOLONE ACETONIDE INJ [ [facility]] Follow-up Instructions Return in about 2 months (around 9/30/2017). Introducing [facility]! [employer] introduces [employer] patient portal. Now you can access parts of your medical record, email your doctor's office, and request medication refills online. 1. In your internet browser, go to [email or web address] 2. Click on the First Time User? Click Here link in the Sign In box. You will see the New Member Sign Up page. 3. Enter your [employer] Access Code exactly as it appears below. You will not need to use this code after youve completed the sign-up process. If you do not sign up before the expiration date, you must request a new code. · [employer] Access Code: [email or web address] Expires: 9/11/2017  2:41 PM 
 
4. Enter the last four digits of your Social Security Number (xxxx) and Date of Birth (mm/dd/yyyy) as indicated and click Submit. You will be taken to the next sign-up page. 5. Create a [employer] ID. This will be your [employer] login ID and cannot be changed, so think of one that is secure and easy to remember. 6. Create a [employer] password. You can change your password at any time. 7. Enter your Password Reset Question and Answer. This can be used at a later time if you forget your password. 8. Enter your e-mail address. You will receive e-mail notification when new information is available in [address] 9. Click Sign Up. You can now view and download portions of your medical record. 10. Click the Download Summary menu link to download a portable copy of your medical information. If you have questions, please visit the Frequently Asked Questions section of the [employer] website. Remember, [employer] is NOT to be used for urgent needs. For medical emergencies, dial 911. Now available from your iPhone and Android! Please provide this summary of care documentation to your next provider. Your primary care clinician is listed as Ezequiel Lynne. If you have any questions after today's visit, please call [phone].

## 2017-09-08 RX ORDER — FENTANYL 75 UG/H
1 PATCH TRANSDERMAL
Qty: 10 PATCH | Refills: 0 | Status: SHIPPED | OUTPATIENT
Start: 2017-09-08 | End: 2017-10-31 | Stop reason: SDUPTHER

## 2017-09-21 RX ORDER — HYDROCODONE BITARTRATE AND ACETAMINOPHEN 10; 325 MG/1; MG/1
1 TABLET ORAL
Qty: 120 TAB | Refills: 0 | Status: SHIPPED | OUTPATIENT
Start: 2017-09-21 | End: 2017-10-31 | Stop reason: SDUPTHER

## 2017-10-31 ENCOUNTER — OFFICE VISIT (OUTPATIENT)
Dept: INTERNAL MEDICINE CLINIC | Age: 57
End: 2017-10-31

## 2017-10-31 VITALS
HEART RATE: 89 BPM | SYSTOLIC BLOOD PRESSURE: 117 MMHG | WEIGHT: 193.4 LBS | BODY MASS INDEX: 27.69 KG/M2 | RESPIRATION RATE: 18 BRPM | OXYGEN SATURATION: 98 % | TEMPERATURE: 98.8 F | DIASTOLIC BLOOD PRESSURE: 75 MMHG | HEIGHT: 70 IN

## 2017-10-31 DIAGNOSIS — Z23 ENCOUNTER FOR IMMUNIZATION: ICD-10-CM

## 2017-10-31 DIAGNOSIS — I10 ESSENTIAL HYPERTENSION: ICD-10-CM

## 2017-10-31 DIAGNOSIS — B18.2 CHRONIC HEPATITIS C WITHOUT HEPATIC COMA (HCC): ICD-10-CM

## 2017-10-31 DIAGNOSIS — D57.00 HB-SS DISEASE WITH CRISIS (HCC): Primary | ICD-10-CM

## 2017-10-31 DIAGNOSIS — H93.A9 PULSATILE TINNITUS: ICD-10-CM

## 2017-10-31 DIAGNOSIS — F32.A DEPRESSION, UNSPECIFIED DEPRESSION TYPE: ICD-10-CM

## 2017-10-31 RX ORDER — HYDROCODONE BITARTRATE AND ACETAMINOPHEN 10; 325 MG/1; MG/1
1 TABLET ORAL
Qty: 120 TAB | Refills: 0 | Status: SHIPPED | OUTPATIENT
Start: 2017-10-31 | End: 2017-12-04 | Stop reason: SDUPTHER

## 2017-10-31 RX ORDER — FENTANYL 75 UG/H
1 PATCH TRANSDERMAL
Qty: 10 PATCH | Refills: 0 | Status: SHIPPED | OUTPATIENT
Start: 2017-10-31 | End: 2018-01-05 | Stop reason: CLARIF

## 2017-10-31 NOTE — PROGRESS NOTES
Chief Complaint   Patient presents with    Sickle Cell Disease     patient states she is doing well     Leg Pain     sharp pains that go through her right thigh off and on now for 3-4 months    Ear Pain     patient complains of pulsating in the ears off and on since May     1. Have you been to the ER, urgent care clinic since your last visit? Hospitalized since your last visit? No    2. Have you seen or consulted any other health care providers outside of the [address] [place] [address] since your last visit? Include any pap smears or colon screening.  No

## 2017-10-31 NOTE — PROGRESS NOTES
[address] [place] [address] and Primary Care  [place] [address]  [address]  [place] [address] [address]  Phone:  [phone]  Fax: [phone]       Chief Complaint   Patient presents with    Sickle Cell Disease     patient states she is doing well     Leg Pain     sharp pains that go through her right thigh off and on now for 3-4 months    Ear Pain     patient complains of pulsating in the ears off and on since May   . SUBJECTIVE:    Radu Quarles is a 56 y.o. female Comes in for follow up of her sickle cell anemia. She needs refills on her analgesics. She is doing quite well. Her Hydrocodone is taken sparingly, but she does not take and never has on a regular basis. She has not been hospitalized in the last six to eight months, which is great. She continues to have pulsatile tinnitus in the right ear. It hs now been present for the last eight months. She has been reasonably stable as far as the depression is concerned. Finally, she has been taking antihypertensive medication as prescribed           Current Outpatient Prescriptions   Medication Sig Dispense Refill    HYDROcodone-acetaminophen (NORCO)  mg tablet Take 1 Tab by mouth every six (6) hours as needed for Pain. Max Daily Amount: 4 Tabs. 120 Tab 0    fentaNYL (DURAGESIC) 75 mcg/hr 1 Patch by TransDERmal route every seventy-two (72) hours. Max Daily Amount: 1 Patch. 10 Patch 0    citalopram (CELEXA) 10 mg tablet TAKE 1 TABLET BY MOUTH EVERY NIGHT AT BEDTIME 90 Tab 3    folic acid (FOLVITE) 1 mg tablet TAKE 1 TABLET BY MOUTH EVERY DAY 90 Tab 3    OMEPRAZOLE, BULK, Take 20 mg by mouth daily. 20 mg, PO, daily, 0 Refills      fluocinoNIDE (LIDEX) 0.05 % topical cream two (2) times daily as needed.       losartan-hydroCHLOROthiazide (HYZAAR) 50-12.5 mg per tablet TAKE 1 TABLET BY MOUTH DAILY 90 Tab 3     Past Medical History:   Diagnosis Date    Anemia     SC hemoglobinopathy    Chronic pain     Depression     Hypertension  Liver disease     hepatitis C     Past Surgical History:   Procedure Laterality Date    CHEST SURGERY PROCEDURE UNLISTED      status post thor for removal of a benign     HX CHOLECYSTECTOMY      HX ORTHOPAEDIC      bony curettage of an ostial myelitis focus     Allergies   Allergen Reactions    Dilaudid [Hydromorphone (Bulk)] Itching     Can tolerate with benadryl and ondansetron    Percocet [Oxycodone-Acetaminophen] Itching         REVIEW OF SYSTEMS:  General: negative for - chills or fever  ENT: negative for - headaches, nasal congestion or tinnitus  Respiratory: negative for - cough, hemoptysis, shortness of breath or wheezing  Cardiovascular : negative for - chest pain, edema, palpitations or shortness of breath  Gastrointestinal: negative for - abdominal pain, blood in stools, heartburn or nausea/vomiting  Genito-Urinary: no dysuria, trouble voiding, or hematuria  Musculoskeletal: negative for - gait disturbance, joint pain, joint stiffness or joint swelling  Neurological: no TIA or stroke symptoms  Hematologic: no bruises, no bleeding, no swollen glands  Integument: no lumps, mole changes, nail changes or rash  Endocrine: no malaise/lethargy or unexpected weight changes      Social History     Social History    Marital status:      Spouse name: N/A    Number of children: 2    Years of education: N/A     Occupational History    disabled---Sickle cell disease      Social History Main Topics    Smoking status: Never Smoker    Smokeless tobacco: Never Used    Alcohol use No    Drug use: No    Sexual activity: Yes     Partners: Male     Other Topics Concern    None     Social History Narrative     Family History   Problem Relation Age of Onset    Hypertension Mother     Hypertension Father        OBJECTIVE:    Visit Vitals    /75 (BP 1 Location: Left arm, BP Patient Position: Sitting)    Pulse 89    Temp 98.8 °F (37.1 °C) (Oral)    Resp 18    Ht 5' 10\" (1.778 m)    Wt 193 lb 6.4 oz (87.7 kg)    SpO2 98%    BMI 27.75 kg/m2     CONSTITUTIONAL: well , well nourished, appears age appropriate  EYES: perrla, eom intact  ENMT:moist mucous membranes, pharynx clear  NECK: supple. Thyroid normal  RESPIRATORY: Chest: clear to ascultation and percussion   CARDIOVASCULAR: Heart: regular rate and rhythm  GASTROINTESTINAL: Abdomen: soft, bowel sounds active  HEMATOLOGIC: no pathological lymph nodes palpated  MUSCULOSKELETAL: Extremities: no edema, pulse 1+   INTEGUMENT: No unusual rashes or suspicious skin lesions noted. Nails appear normal.  NEUROLOGIC: non-focal exam   MENTAL STATUS: alert and oriented, appropriate affect      ASSESSMENT:  1. Hb-SS disease with crisis (HCC)    2. Pulsatile tinnitus    3. Essential hypertension    4. Depression, unspecified depression type    5. Chronic hepatitis C without hepatic coma ([facility]    6. Encounter for immunization        PLAN:    1. Her sickle cell disease appears to be doing well. She will continue her Hydrocodone and Fentanyl. 2. She appears to have pulsatile tinnitus. Given the fairly recent onset, MRA will be obtained to rule out aneurysm. 3. Blood pressure is excellent today. No adjustments are made. 4. Her depression is reasonably stable. 5. Her liver enzymes are slightly elevated. She, however, has been successfully treated for Hepatitis C. I will continue to monitor this. 6. I encourage her to remain as physically active as possible. .  Orders Placed This Encounter    MRA NECK W CONT    Influenza virus vaccine (QUADRIVALENT PRES FREE SYRINGE) IM ([id number])    LIPID PANEL    APOLIPOPROTEIN B    METABOLIC PANEL, COMPREHENSIVE    HYDROcodone-acetaminophen (NORCO)  mg tablet    fentaNYL (DURAGESIC) 75 mcg/hr         Follow-up Disposition:  Return in about 3 months (around 1/31/2018).       Shamika Allen MD

## 2017-10-31 NOTE — MR AVS SNAPSHOT
Visit Information Date & Time Provider Department Dept. Phone Encounter #  
 10/31/2017 10:30 AM Tyler Lira MD [facility] and [address] [id number] Upcoming Health Maintenance Date Due INFLUENZA AGE 9 TO ADULT 8/1/2017 BREAST CANCER SCRN MAMMOGRAM 9/17/2017 FOBT Q 1 YEAR AGE 50-75 7/31/2020* PAP AKA CERVICAL CYTOLOGY 4/2/2018 Pneumococcal 19-64 Highest Risk (3 of 3 - PCV13) 10/31/2018 DTaP/Tdap/Td series (2 - Td) 5/17/2026 *Topic was postponed. The date shown is not the original due date. Allergies as of 10/31/2017  Review Complete On: 10/31/2017 By: Nancy Patel Severity Noted Reaction Type Reactions Dilaudid [Hydromorphone (Bulk)]  10/03/2014    Itching Can tolerate with benadryl and ondansetron Percocet [Oxycodone-acetaminophen]  10/03/2014    Itching Current Immunizations  Reviewed on 5/22/2017 Name Date Influenza Vaccine (Quad) PF  Incomplete Not reviewed this visit You Were Diagnosed With   
  
 Codes Comments Encounter for immunization    -  Primary ICD-10-CM: S25 ICD-9-CM: V03.89 Hb-SS disease with crisis [facility])     ICD-10-CM: D57.00 ICD-9-CM: 282.62 Pulsatile tinnitus     ICD-10-CM: H93. A9 ICD-9-CM: 388.30 Essential hypertension     ICD-10-CM: I10 
ICD-9-CM: 401.9 Depression, unspecified depression type     ICD-10-CM: F32.9 ICD-9-CM: 813 Chronic hepatitis C without hepatic coma (HCC)     ICD-10-CM: B18.2 ICD-9-CM: 070.54 Vitals BP Pulse Temp Resp Height(growth percentile) Weight(growth percentile) 117/75 (BP 1 Location: Left arm, BP Patient Position: Sitting) 89 98.8 °F (37.1 °C) (Oral) 18 5' 10\" (1.778 m) 193 lb 6.4 oz (87.7 kg) SpO2 BMI OB Status Smoking Status 98% 27.75 kg/m2 Postmenopausal Never Smoker Vitals History BMI and BSA Data  Body Mass Index Body Surface Area  
 27.75 kg/m 2 2.08 m 2  
  
  
 Preferred Pharmacy Pharmacy Name Phone [id number] [phone] Your Updated Medication List  
  
   
This list is accurate as of: 10/31/17  1:25 PM.  Always use your most recent med list.  
  
  
  
  
 citalopram 10 mg tablet Commonly known as:  CELEXA  
TAKE 1 TABLET BY MOUTH EVERY NIGHT AT BEDTIME  
  
 fentaNYL 75 mcg/hr Commonly known as:  DURAGESIC  
1 Patch by TransDERmal route every seventy-two (72) hours. Max Daily Amount: 1 Patch. fluocinoNIDE 0.05 % topical cream  
Commonly known as:  LIDEX  
two (2) times daily as needed. folic acid 1 mg tablet Commonly known as:  FOLVITE  
TAKE 1 TABLET BY MOUTH EVERY DAY  
  
 HYDROcodone-acetaminophen  mg tablet Commonly known as:  Iven Michel Take 1 Tab by mouth every six (6) hours as needed for Pain. Max Daily Amount: 4 Tabs. losartan-hydroCHLOROthiazide 50-12.5 mg per tablet Commonly known as:  HYZAAR  
TAKE 1 TABLET BY MOUTH DAILY  
  
 OMEPRAZOLE (BULK) Take 20 mg by mouth daily. 20 mg, PO, daily, 0 Refills Prescriptions Printed Refills HYDROcodone-acetaminophen (NORCO)  mg tablet 0 Sig: Take 1 Tab by mouth every six (6) hours as needed for Pain. Max Daily Amount: 4 Tabs. Class: Print Route: Oral  
 fentaNYL (DURAGESIC) 75 mcg/hr 0 Si Patch by TransDERmal route every seventy-two (72) hours. Max Daily Amount: 1 Patch. Class: Print Route: TransDERmal  
  
We Performed the Following APOLIPOPROTEIN B [id number] CPT(R)] INFLUENZA VIRUS VAC QUAD,SPLIT,PRESV FREE SYRINGE IM [id number] CPT(R)] LIPID PANEL [73972 CPT(R)] METABOLIC PANEL, COMPREHENSIVE [16741 CPT(R)] CT IMMUNIZ ADMIN,1 SINGLE/COMB VAC/TOXOID [id number] CPT(R)] Introducing [facility]!    
 [employer] introduces [employer] patient portal. Now you can access parts of your medical record, email your doctor's office, and request medication refills online. 1. In your internet browser, go to [email or web address] 2. Click on the First Time User? Click Here link in the Sign In box. You will see the New Member Sign Up page. 3. Enter your [employer] Access Code exactly as it appears below. You will not need to use this code after youve completed the sign-up process. If you do not sign up before the expiration date, you must request a new code. · [employer] Access Code: [email or web address] Expires: 1/29/2018  1:25 PM 
 
4. Enter the last four digits of your Social Security Number (xxxx) and Date of Birth (mm/dd/yyyy) as indicated and click Submit. You will be taken to the next sign-up page. 5. Create a [employer] ID. This will be your [employer] login ID and cannot be changed, so think of one that is secure and easy to remember. 6. Create a [employer] password. You can change your password at any time. 7. Enter your Password Reset Question and Answer. This can be used at a later time if you forget your password. 8. Enter your e-mail address. You will receive e-mail notification when new information is available in [address] 9. Click Sign Up. You can now view and download portions of your medical record. 10. Click the Download Summary menu link to download a portable copy of your medical information. If you have questions, please visit the Frequently Asked Questions section of the [employer] website. Remember, [employer] is NOT to be used for urgent needs. For medical emergencies, dial 911. Now available from your iPhone and Android! Please provide this summary of care documentation to your next provider. Your primary care clinician is listed as Ezequiel Lynne. If you have any questions after today's visit, please call [phone].

## 2017-11-01 LAB
ALBUMIN SERPL-MCNC: 4.1 G/DL (ref 3.5–5.5)
ALBUMIN/GLOB SERPL: 1 {RATIO} (ref 1.2–2.2)
ALP SERPL-CCNC: 78 IU/L (ref 39–117)
ALT SERPL-CCNC: 12 IU/L (ref 0–32)
APO B SERPL-MCNC: 51 MG/DL (ref 54–133)
AST SERPL-CCNC: 40 IU/L (ref 0–40)
BILIRUB SERPL-MCNC: 1.9 MG/DL (ref 0–1.2)
BUN SERPL-MCNC: 9 MG/DL (ref 6–24)
BUN/CREAT SERPL: 11 (ref 9–23)
CALCIUM SERPL-MCNC: 9.2 MG/DL (ref 8.7–10.2)
CHLORIDE SERPL-SCNC: 101 MMOL/L (ref 96–106)
CHOLEST SERPL-MCNC: 118 MG/DL (ref 100–199)
CO2 SERPL-SCNC: 26 MMOL/L (ref 18–29)
CREAT SERPL-MCNC: 0.85 MG/DL (ref 0.57–1)
GFR SERPLBLD CREATININE-BSD FMLA CKD-EPI: 77 ML/MIN/1.73
GFR SERPLBLD CREATININE-BSD FMLA CKD-EPI: 89 ML/MIN/1.73
GLOBULIN SER CALC-MCNC: 4.3 G/DL (ref 1.5–4.5)
GLUCOSE SERPL-MCNC: 89 MG/DL (ref 65–99)
HDLC SERPL-MCNC: 46 MG/DL
LDLC SERPL CALC-MCNC: 56 MG/DL (ref 0–99)
POTASSIUM SERPL-SCNC: 4.2 MMOL/L (ref 3.5–5.2)
PROT SERPL-MCNC: 8.4 G/DL (ref 6–8.5)
SODIUM SERPL-SCNC: 138 MMOL/L (ref 134–144)
TRIGL SERPL-MCNC: 81 MG/DL (ref 0–149)
VLDLC SERPL CALC-MCNC: 16 MG/DL (ref 5–40)

## 2017-11-14 ENCOUNTER — OFFICE VISIT (OUTPATIENT)
Dept: INTERNAL MEDICINE CLINIC | Age: 57
End: 2017-11-14

## 2017-11-14 VITALS
RESPIRATION RATE: 16 BRPM | OXYGEN SATURATION: 97 % | TEMPERATURE: 98.9 F | SYSTOLIC BLOOD PRESSURE: 124 MMHG | HEIGHT: 70 IN | DIASTOLIC BLOOD PRESSURE: 79 MMHG | BODY MASS INDEX: 28.39 KG/M2 | WEIGHT: 198.3 LBS | HEART RATE: 93 BPM

## 2017-11-14 DIAGNOSIS — K29.70 VIRAL GASTRITIS: ICD-10-CM

## 2017-11-14 DIAGNOSIS — H00.015 HORDEOLUM OF LEFT LOWER EYELID, UNSPECIFIED HORDEOLUM TYPE: Primary | ICD-10-CM

## 2017-11-14 DIAGNOSIS — D57.00 HB-SS DISEASE WITH CRISIS (HCC): ICD-10-CM

## 2017-11-14 DIAGNOSIS — H93.A9 PULSATILE TINNITUS: ICD-10-CM

## 2017-11-14 NOTE — PROGRESS NOTES
[address] [facility]  [place] [address]  [address] [place] [address]  Phone:  [phone]  Fax: [phone]       Chief Complaint   Patient presents with    Stye     patient complains of stye on left eye. .      SUBJECTIVE:    Siddhartha Cornejo is a 56 y.o. female Comes in for return visit stating that she has not done well. She has noted swelling and pain in the lower lid of her left eye. This has been present now for the last five to seven days. In the last three days she has had intermittent nausea. She then reminds me that since June she has had episodic pulsatile tinnitus in her right ear. There has been no meaningful change. Her sickle cell disease is doing reasonably well. She remains on her analgesics, which have been successful at preventing re-hospitalization. Current Outpatient Prescriptions   Medication Sig Dispense Refill    HYDROcodone-acetaminophen (NORCO)  mg tablet Take 1 Tab by mouth every six (6) hours as needed for Pain. Max Daily Amount: 4 Tabs. 120 Tab 0    fentaNYL (DURAGESIC) 75 mcg/hr 1 Patch by TransDERmal route every seventy-two (72) hours. Max Daily Amount: 1 Patch. 10 Patch 0    citalopram (CELEXA) 10 mg tablet TAKE 1 TABLET BY MOUTH EVERY NIGHT AT BEDTIME 90 Tab 3    folic acid (FOLVITE) 1 mg tablet TAKE 1 TABLET BY MOUTH EVERY DAY 90 Tab 3    OMEPRAZOLE, BULK, Take 20 mg by mouth daily. 20 mg, PO, daily, 0 Refills      fluocinoNIDE (LIDEX) 0.05 % topical cream two (2) times daily as needed.       losartan-hydroCHLOROthiazide (HYZAAR) 50-12.5 mg per tablet TAKE 1 TABLET BY MOUTH DAILY 90 Tab 3     Past Medical History:   Diagnosis Date    Anemia     SC hemoglobinopathy    Chronic pain     Depression     Hypertension     Liver disease     hepatitis C     Past Surgical History:   Procedure Laterality Date    CHEST SURGERY PROCEDURE UNLISTED      status post thor for removal of a benign     HX CHOLECYSTECTOMY  HX ORTHOPAEDIC      bony curettage of an ostial myelitis focus     Allergies   Allergen Reactions    Dilaudid [Hydromorphone (Bulk)] Itching     Can tolerate with benadryl and ondansetron    Percocet [Oxycodone-Acetaminophen] Itching         REVIEW OF SYSTEMS:  General: negative for - chills or fever  ENT: negative for - headaches, nasal congestion or tinnitus  Respiratory: negative for - cough, hemoptysis, shortness of breath or wheezing  Cardiovascular : negative for - chest pain, edema, palpitations or shortness of breath  Gastrointestinal: negative for - abdominal pain, blood in stools, heartburn or nausea/vomiting  Genito-Urinary: no dysuria, trouble voiding, or hematuria  Musculoskeletal: negative for - gait disturbance, joint pain, joint stiffness or joint swelling  Neurological: no TIA or stroke symptoms  Hematologic: no bruises, no bleeding, no swollen glands  Integument: no lumps, mole changes, nail changes or rash  Endocrine: no malaise/lethargy or unexpected weight changes      Social History     Social History    Marital status:      Spouse name: N/A    Number of children: 2    Years of education: N/A     Occupational History    disabled---Sickle cell disease      Social History Main Topics    Smoking status: Never Smoker    Smokeless tobacco: Never Used    Alcohol use No    Drug use: No    Sexual activity: Yes     Partners: Male     Other Topics Concern    None     Social History Narrative     Family History   Problem Relation Age of Onset    Hypertension Mother     Hypertension Father        OBJECTIVE:    Visit Vitals    /79 (BP 1 Location: Left arm, BP Patient Position: Sitting)    Pulse 93    Temp 98.9 °F (37.2 °C) (Oral)    Resp 16    Ht 5' 10\" (1.778 m)    Wt 198 lb 4.8 oz (89.9 kg)    SpO2 97%    BMI 28.45 kg/m2     CONSTITUTIONAL: well , well nourished, appears age appropriate  EYES: perrla, eom intact, slight swelling right lower lid  ENMT:moist mucous membranes, pharynx clear  NECK: supple. Thyroid normal  RESPIRATORY: Chest: clear to ascultation and percussion   CARDIOVASCULAR: Heart: regular rate and rhythm  GASTROINTESTINAL: Abdomen: soft, bowel sounds active  HEMATOLOGIC: no pathological lymph nodes palpated  MUSCULOSKELETAL: Extremities: no edema, pulse 1+   INTEGUMENT: No unusual rashes or suspicious skin lesions noted. Nails appear normal.  NEUROLOGIC: non-focal exam   MENTAL STATUS: alert and oriented, appropriate affect      ASSESSMENT:  1. Hordeolum of left lower eyelid, unspecified hordeolum type    2. Viral gastritis    3. Pulsatile tinnitus    4. Hb-SS disease with crisis ([facility] [address]          PLAN:    1. The patient appears to have a hordeolum of her right lower lid. There are two areas that appear to have drained. There is eschar present. She is to continue using warm compresses only. She does not need any topical preparation currently. This should resolve over next week. 2. She does have mild nausea, but I suspect this is a viral process and therefore is self limiting. If it does not get any better over the next week, she will give us a call. 3. For the pulsatile tinnitus, in view of the fact that there has been no meaningful change, an MRA will be obtained to rule out an aneurysm. 4. Her sickle cell disease is doing reasonably well and no adjustments are made in her current analgesic dosage. Follow-up Disposition:  Return keep old apt.       Sophie Stout MD

## 2017-11-14 NOTE — MR AVS SNAPSHOT
Visit Information Date & Time Provider Department Dept. Phone Encounter #  
 11/14/2017  9:00 AM Sarah Quintanilla MD [facility] [phone] [id number] Your Appointments 2/6/2018 10:30 AM  
Any with Sarah Quintanilla MD  
[address] [facility] and [facility] [address]) Appt Note: 3 month f/u htn  
 [address] [place]  
  
   
 [phone] Upcoming Health Maintenance Date Due  
 BREAST CANCER SCRN MAMMOGRAM 2/14/2018* FOBT Q 1 YEAR AGE 50-75 7/31/2020* PAP AKA CERVICAL CYTOLOGY 4/2/2018 Pneumococcal 19-64 Highest Risk (3 of 3 - PCV13) 10/31/2018 DTaP/Tdap/Td series (2 - Td) 5/17/2026 *Topic was postponed. The date shown is not the original due date. Allergies as of 11/14/2017  Review Complete On: 11/14/2017 By: Deena Kennedy Severity Noted Reaction Type Reactions Dilaudid [Hydromorphone (Bulk)]  10/03/2014    Itching Can tolerate with benadryl and ondansetron Percocet [Oxycodone-acetaminophen]  10/03/2014    Itching Current Immunizations  Reviewed on 5/22/2017 Name Date Influenza Vaccine (Quad) PF 10/31/2017 Not reviewed this visit Vitals BP Pulse Temp Resp Height(growth percentile) Weight(growth percentile) 124/79 (BP 1 Location: Left arm, BP Patient Position: Sitting) 93 98.9 °F (37.2 °C) (Oral) 16 5' 10\" (1.778 m) 198 lb 4.8 oz (89.9 kg) SpO2 BMI OB Status Smoking Status 97% 28.45 kg/m2 Postmenopausal Never Smoker BMI and BSA Data Body Mass Index Body Surface Area  
 28.45 kg/m 2 2.11 m 2 Preferred Pharmacy Pharmacy Name Phone [id number] [phone] Your Updated Medication List  
  
   
This list is accurate as of: 11/14/17 10:17 AM.  Always use your most recent med list.  
  
  
  
  
 citalopram 10 mg tablet Commonly known as:  CELEXA  
TAKE 1 TABLET BY MOUTH EVERY NIGHT AT BEDTIME  
  
 fentaNYL 75 mcg/hr Commonly known as:  DURAGESIC  
1 Patch by TransDERmal route every seventy-two (72) hours. Max Daily Amount: 1 Patch. fluocinoNIDE 0.05 % topical cream  
Commonly known as:  LIDEX  
two (2) times daily as needed. folic acid 1 mg tablet Commonly known as:  FOLVITE  
TAKE 1 TABLET BY MOUTH EVERY DAY  
  
 HYDROcodone-acetaminophen  mg tablet Commonly known as:  March Castles Take 1 Tab by mouth every six (6) hours as needed for Pain. Max Daily Amount: 4 Tabs. losartan-hydroCHLOROthiazide 50-12.5 mg per tablet Commonly known as:  HYZAAR  
TAKE 1 TABLET BY MOUTH DAILY  
  
 OMEPRAZOLE (BULK) Take 20 mg by mouth daily. 20 mg, PO, daily, 0 Refills Introducing \[facility]\"" & HEALTH SERVICES! Braydon Bautista introduces [employer] patient portal. Now you can access parts of your medical record, email your doctor's office, and request medication refills online. 1. In your internet browser, go to [email or web address] 2. Click on the First Time User? Click Here link in the Sign In box. You will see the New Member Sign Up page. 3. Enter your [employer] Access Code exactly as it appears below. You will not need to use this code after youve completed the sign-up process. If you do not sign up before the expiration date, you must request a new code. · [employer] Access Code: [email or web address] Expires: 1/29/2018 12:25 PM 
 
4. Enter the last four digits of your Social Security Number (xxxx) and Date of Birth (mm/dd/yyyy) as indicated and click Submit. You will be taken to the next sign-up page. 5. Create a [employer] ID. This will be your [employer] login ID and cannot be changed, so think of one that is secure and easy to remember. 6. Create a [employer] password. You can change your password at any time. 7. Enter your Password Reset Question and Answer. This can be used at a later time if you forget your password. 8. Enter your e-mail address. You will receive e-mail notification when new information is available in [address] 9. Click Sign Up. You can now view and download portions of your medical record. 10. Click the Download Summary menu link to download a portable copy of your medical information. If you have questions, please visit the Frequently Asked Questions section of the [employer] website. Remember, [employer] is NOT to be used for urgent needs. For medical emergencies, dial 911. Now available from your iPhone and Android! Please provide this summary of care documentation to your next provider. Your primary care clinician is listed as Ezequiel Lynne. If you have any questions after today's visit, please call [phone].

## 2017-11-14 NOTE — PROGRESS NOTES
Chief Complaint   Patient presents with    Stye     patient complains of stye on left eye.      1. Have you been to the ER, urgent care clinic since your last visit? Hospitalized since your last visit? No    2. Have you seen or consulted any other health care providers outside of the [address] [place] [address] since your last visit? Include any pap smears or colon screening.  No

## 2017-11-21 ENCOUNTER — HOSPITAL ENCOUNTER (OUTPATIENT)
Dept: MRI IMAGING | Age: 57
Discharge: HOME | End: 2017-11-21
Payer: MEDICARE

## 2017-11-21 DIAGNOSIS — H93.A9 PULSATILE TINNITUS: ICD-10-CM

## 2017-11-21 PROCEDURE — 70544 MR ANGIOGRAPHY HEAD W/O DYE: CPT

## 2017-11-28 ENCOUNTER — OFFICE VISIT (OUTPATIENT)
Dept: INTERNAL MEDICINE CLINIC | Age: 57
End: 2017-11-28

## 2017-11-28 VITALS
DIASTOLIC BLOOD PRESSURE: 81 MMHG | HEART RATE: 96 BPM | TEMPERATURE: 98.9 F | HEIGHT: 70 IN | OXYGEN SATURATION: 97 % | SYSTOLIC BLOOD PRESSURE: 120 MMHG | RESPIRATION RATE: 16 BRPM | BODY MASS INDEX: 27.67 KG/M2 | WEIGHT: 193.3 LBS

## 2017-11-28 DIAGNOSIS — D57.00 SICKLE CELL CRISIS (HCC): ICD-10-CM

## 2017-11-28 DIAGNOSIS — M62.838 MUSCLE SPASM: Primary | ICD-10-CM

## 2017-11-28 DIAGNOSIS — R11.0 NAUSEA: ICD-10-CM

## 2017-11-28 DIAGNOSIS — F32.A DEPRESSION, UNSPECIFIED DEPRESSION TYPE: ICD-10-CM

## 2017-11-28 RX ORDER — CYCLOBENZAPRINE HCL 10 MG
10 TABLET ORAL
Qty: 50 TAB | Refills: 1 | Status: SHIPPED | OUTPATIENT
Start: 2017-11-28 | End: 2019-01-03

## 2017-11-28 RX ORDER — CITALOPRAM 10 MG/1
TABLET ORAL
Qty: 90 TAB | Refills: 3 | Status: SHIPPED | OUTPATIENT
Start: 2017-11-28 | End: 2019-01-07 | Stop reason: SDUPTHER

## 2017-11-28 NOTE — PROGRESS NOTES
Chief Complaint   Patient presents with    Neck Pain     patient states that she gets really bad pain/tightness in her neck and around her head, and states that the only relief she can get is to lay down. 1. Have you been to the ER, urgent care clinic since your last visit? Hospitalized since your last visit? No    2. Have you seen or consulted any other health care providers outside of the [address] [place] [address] since your last visit? Include any pap smears or colon screening.  No

## 2017-11-28 NOTE — MR AVS SNAPSHOT
Visit Information Date & Time Provider Department Dept. Phone Encounter #  
 11/28/2017  4:00 PM Tyler Lira MD [facility] Medicine and Primary Care [phone] Your Appointments 2/6/2018 10:30 AM  
Any with Tyler Lira MD  
[address] [place] [address] and Primary Care [address] [facility]) Appt Note: 3 month f/u htn  
 [address] [place]  
  
   
 [phone] Upcoming Health Maintenance Date Due  
 BREAST CANCER SCRN MAMMOGRAM 2/14/2018* FOBT Q 1 YEAR AGE 50-75 7/31/2020* PAP AKA CERVICAL CYTOLOGY 4/2/2018 Pneumococcal 19-64 Highest Risk (3 of 3 - PCV13) 10/31/2018 DTaP/Tdap/Td series (2 - Td) 5/17/2026 *Topic was postponed. The date shown is not the original due date. Allergies as of 11/28/2017  Review Complete On: 11/28/2017 By: Jeet Yanes Severity Noted Reaction Type Reactions Dilaudid [Hydromorphone (Bulk)]  10/03/2014    Itching Can tolerate with benadryl and ondansetron Percocet [Oxycodone-acetaminophen]  10/03/2014    Itching Current Immunizations  Reviewed on 5/22/2017 Name Date Influenza Vaccine (Quad) PF 10/31/2017 Not reviewed this visit Vitals BP Pulse Temp Resp Height(growth percentile) Weight(growth percentile) 120/81 (BP 1 Location: Left arm, BP Patient Position: Sitting) 96 98.9 °F (37.2 °C) (Oral) 16 5' 10\" (1.778 m) 193 lb 4.8 oz (87.7 kg) SpO2 BMI OB Status Smoking Status 97% 27.74 kg/m2 Postmenopausal Never Smoker BMI and BSA Data Body Mass Index Body Surface Area  
 27.74 kg/m 2 2.08 m 2 Preferred Pharmacy Pharmacy Name Phone [address] [phone] Your Updated Medication List  
  
   
 This list is accurate as of: 11/28/17  5:15 PM.  Always use your most recent med list.  
  
  
  
  
 citalopram 10 mg tablet Commonly known as:  CELEXA  
TAKE 1 TABLET BY MOUTH EVERY NIGHT AT BEDTIME  
  
 fentaNYL 75 mcg/hr Commonly known as:  DURAGESIC  
1 Patch by TransDERmal route every seventy-two (72) hours. Max Daily Amount: 1 Patch. fluocinoNIDE 0.05 % topical cream  
Commonly known as:  LIDEX  
two (2) times daily as needed. folic acid 1 mg tablet Commonly known as:  FOLVITE  
TAKE 1 TABLET BY MOUTH EVERY DAY  
  
 HYDROcodone-acetaminophen  mg tablet Commonly known as:  Helena Jones Take 1 Tab by mouth every six (6) hours as needed for Pain. Max Daily Amount: 4 Tabs. losartan-hydroCHLOROthiazide 50-12.5 mg per tablet Commonly known as:  HYZAAR  
TAKE 1 TABLET BY MOUTH DAILY  
  
 OMEPRAZOLE (BULK) Take 20 mg by mouth daily. 20 mg, PO, daily, 0 Refills Introducing [facility] & HEALTH SERVICES! [facility] introduces [employer] patient portal. Now you can access parts of your medical record, email your doctor's office, and request medication refills online. 1. In your internet browser, go to https://"[employer]/"[employer] 2. Click on the First Time User? Click Here link in the Sign In box. You will see the New Member Sign Up page. 3. Enter your [employer] Access Code exactly as it appears below. You will not need to use this code after youve completed the sign-up process. If you do not sign up before the expiration date, you must request a new code. · [employer] Access Code: [email or web address] Expires: 1/29/2018 12:25 PM 
 
4. Enter the last four digits of your Social Security Number (xxxx) and Date of Birth (mm/dd/yyyy) as indicated and click Submit. You will be taken to the next sign-up page. 5. Create a [employer] ID. This will be your [employer] login ID and cannot be changed, so think of one that is secure and easy to remember. 6. Create a [employer] password. You can change your password at any time. 7. Enter your Password Reset Question and Answer. This can be used at a later time if you forget your password. 8. Enter your e-mail address. You will receive e-mail notification when new information is available in [address] 9. Click Sign Up. You can now view and download portions of your medical record. 10. Click the Download Summary menu link to download a portable copy of your medical information. If you have questions, please visit the Frequently Asked Questions section of the [employer] website. Remember, [employer] is NOT to be used for urgent needs. For medical emergencies, dial 911. Now available from your iPhone and Android! Please provide this summary of care documentation to your next provider. Your primary care clinician is listed as Ezequiel Lynne. If you have any questions after today's visit, please call [phone].

## 2017-11-29 NOTE — PROGRESS NOTES
Chief Complaint   Patient presents with    Neck Pain     patient states that she gets really bad pain/tightness in her neck and around her head, and states that the only relief she can get is to lay down. .      SUBECTIVE:    Lilliana Boland is a 56 y.o. female Comes in for return visit complaining of repetitive episodes where she develops pain in her shoulders and neck and then develops nausea. She generally has to lie down and within ten minutes or so, it abates. It happens several times a day. She admits that she is under a moderate amount of stress currently. Her sickle cell disease has done reasonably well. She needs a refill on her antidepressant. Current Outpatient Prescriptions   Medication Sig Dispense Refill    cyclobenzaprine (FLEXERIL) 10 mg tablet Take 1 Tab by mouth three (3) times daily as needed for Muscle Spasm(s). 50 Tab 1    citalopram (CELEXA) 10 mg tablet TAKE 1 TABLET BY MOUTH EVERY NIGHT AT BEDTIME 90 Tab 3    losartan-hydroCHLOROthiazide (HYZAAR) 50-12.5 mg per tablet TAKE 1 TABLET BY MOUTH DAILY 90 Tab 3    HYDROcodone-acetaminophen (NORCO)  mg tablet Take 1 Tab by mouth every six (6) hours as needed for Pain. Max Daily Amount: 4 Tabs. 120 Tab 0    fentaNYL (DURAGESIC) 75 mcg/hr 1 Patch by TransDERmal route every seventy-two (72) hours. Max Daily Amount: 1 Patch. 10 Patch 0    folic acid (FOLVITE) 1 mg tablet TAKE 1 TABLET BY MOUTH EVERY DAY 90 Tab 3    OMEPRAZOLE, BULK, Take 20 mg by mouth daily. 20 mg, PO, daily, 0 Refills      fluocinoNIDE (LIDEX) 0.05 % topical cream two (2) times daily as needed.        Past Medical History:   Diagnosis Date    Anemia     SC hemoglobinopathy    Chronic pain     Depression     Hypertension     Liver disease     hepatitis C     Past Surgical History:   Procedure Laterality Date    CHEST SURGERY PROCEDURE UNLISTED      status post thor for removal of a benign     HX CHOLECYSTECTOMY      HX ORTHOPAEDIC bony curettage of an ostial myelitis focus     Allergies   Allergen Reactions    Dilaudid [Hydromorphone (Bulk)] Itching     Can tolerate with benadryl and ondansetron    Percocet [Oxycodone-Acetaminophen] Itching       REVIEW OF SYSTEMS:  Review of Systems - Negative except   ENT ROS: negative for - headaches, hearing change, nasal congestion, oral lesions, tinnitus, visual changes or   Respiratory ROS: no cough, shortness of breath, or wheezing  Cardiovascular ROS: no chest pain or dyspnea on exertion  Gastrointestinal ROS: no abdominal pain, change in bowel habits, or black or blood  Genito-Urinary ROS: no dysuria, trouble voiding, or hematuria  Musculoskeletal ROS: negative  Neurological ROS: no TIA or stroke symptoms      Social History     Social History    Marital status:      Spouse name: N/A    Number of children: 2    Years of education: N/A     Occupational History    disabled---Sickle cell disease      Social History Main Topics    Smoking status: Never Smoker    Smokeless tobacco: Never Used    Alcohol use No    Drug use: No    Sexual activity: Yes     Partners: Male     Other Topics Concern    None     Social History Narrative   r  Family History   Problem Relation Age of Onset    Hypertension Mother     Hypertension Father        OBJECTIVE:  Visit Vitals    /81 (BP 1 Location: Left arm, BP Patient Position: Sitting)    Pulse 96    Temp 98.9 °F (37.2 °C) (Oral)    Resp 16    Ht 5' 10\" (1.778 m)    Wt 193 lb 4.8 oz (87.7 kg)    SpO2 97%    BMI 27.74 kg/m2     ENT: perrla,  eom intact  NECK: supple. Thyroid normal, no JVD  CHEST: clear to ascultation and percussion   HEART: regular rate and rhythm  ABD: soft, bowel sounds active,   EXTREMITIES: no edema, pulse 1+  INTEGUMENT: clear      ASSESSMENT:  1. Muscle spasm    2. Nausea    3. Depression, unspecified depression type    4. Sickle cell crisis ([address]        PLAN:    1. She is having muscle spasm in her neck.   I will give her a muscle relaxant. I am not entirely sure of the etiology of these episodes as well as its association with nausea. Fortunately the nausea is short-lived so there is no reason to treat that. Hopefully the muscle relaxer will be adequate. 2. She will continue her Celexa for depression, a refill is given today. 3. Sickle cell disease is quite stable for now. She continues use of analgesics. .  Orders Placed This Encounter    cyclobenzaprine (FLEXERIL) 10 mg tablet    citalopram (CELEXA) 10 mg tablet       Follow-up Disposition:  Return keep old apt.       Morro Flores MD

## 2017-12-04 RX ORDER — HYDROCODONE BITARTRATE AND ACETAMINOPHEN 10; 325 MG/1; MG/1
1 TABLET ORAL
Qty: 120 TAB | Refills: 0 | Status: ON HOLD | OUTPATIENT
Start: 2017-12-04 | End: 2018-01-16 | Stop reason: DRUGHIGH

## 2017-12-05 ENCOUNTER — TELEPHONE (OUTPATIENT)
Dept: INTERNAL MEDICINE CLINIC | Age: 57
End: 2017-12-05

## 2018-01-04 ENCOUNTER — APPOINTMENT (OUTPATIENT)
Dept: GENERAL RADIOLOGY | Age: 58
DRG: 812 | End: 2018-01-04
Attending: EMERGENCY MEDICINE
Payer: MEDICARE

## 2018-01-04 ENCOUNTER — HOSPITAL ENCOUNTER (EMERGENCY)
Age: 58
Discharge: HOME | DRG: 812 | End: 2018-01-04
Attending: EMERGENCY MEDICINE
Payer: MEDICARE

## 2018-01-04 VITALS
TEMPERATURE: 98.8 F | RESPIRATION RATE: 16 BRPM | OXYGEN SATURATION: 100 % | SYSTOLIC BLOOD PRESSURE: 110 MMHG | HEART RATE: 115 BPM | HEIGHT: 70 IN | WEIGHT: 189.6 LBS | DIASTOLIC BLOOD PRESSURE: 80 MMHG | BODY MASS INDEX: 27.14 KG/M2

## 2018-01-04 DIAGNOSIS — E86.0 DEHYDRATION: ICD-10-CM

## 2018-01-04 DIAGNOSIS — D57.00 SICKLE CELL CRISIS (HCC): Primary | ICD-10-CM

## 2018-01-04 LAB
ANION GAP SERPL CALC-SCNC: 6 MMOL/L (ref 5–15)
BUN SERPL-MCNC: 9 MG/DL (ref 6–20)
BUN/CREAT SERPL: 10 (ref 12–20)
CALCIUM SERPL-MCNC: 9 MG/DL (ref 8.5–10.1)
CHLORIDE SERPL-SCNC: 110 MMOL/L (ref 97–108)
CO2 SERPL-SCNC: 24 MMOL/L (ref 21–32)
CREAT SERPL-MCNC: 0.91 MG/DL (ref 0.55–1.02)
ERYTHROCYTE [DISTWIDTH] IN BLOOD BY AUTOMATED COUNT: 14.2 % (ref 11.5–14.5)
GLUCOSE SERPL-MCNC: 87 MG/DL (ref 65–100)
HCT VFR BLD AUTO: 25 % (ref 35–47)
HGB BLD-MCNC: 8.5 G/DL (ref 11.5–16)
MCH RBC QN AUTO: 32.4 PG (ref 26–34)
MCHC RBC AUTO-ENTMCNC: 34 G/DL (ref 30–36.5)
MCV RBC AUTO: 95.4 FL (ref 80–99)
PLATELET # BLD AUTO: 270 K/UL (ref 150–400)
POTASSIUM SERPL-SCNC: 3.8 MMOL/L (ref 3.5–5.1)
RBC # BLD AUTO: 2.62 M/UL (ref 3.8–5.2)
RETICS/RBC NFR AUTO: 5 % (ref 0.7–2.1)
SODIUM SERPL-SCNC: 140 MMOL/L (ref 136–145)
WBC # BLD AUTO: 6.5 K/UL (ref 3.6–11)

## 2018-01-04 PROCEDURE — 74011250636 HC RX REV CODE- 250/636: Performed by: EMERGENCY MEDICINE

## 2018-01-04 PROCEDURE — 96361 HYDRATE IV INFUSION ADD-ON: CPT

## 2018-01-04 PROCEDURE — 71046 X-RAY EXAM CHEST 2 VIEWS: CPT

## 2018-01-04 PROCEDURE — 96375 TX/PRO/DX INJ NEW DRUG ADDON: CPT

## 2018-01-04 PROCEDURE — 85045 AUTOMATED RETICULOCYTE COUNT: CPT | Performed by: EMERGENCY MEDICINE

## 2018-01-04 PROCEDURE — 36415 COLL VENOUS BLD VENIPUNCTURE: CPT | Performed by: EMERGENCY MEDICINE

## 2018-01-04 PROCEDURE — 99284 EMERGENCY DEPT VISIT MOD MDM: CPT

## 2018-01-04 PROCEDURE — 80048 BASIC METABOLIC PNL TOTAL CA: CPT | Performed by: EMERGENCY MEDICINE

## 2018-01-04 PROCEDURE — 85027 COMPLETE CBC AUTOMATED: CPT | Performed by: EMERGENCY MEDICINE

## 2018-01-04 PROCEDURE — 96374 THER/PROPH/DIAG INJ IV PUSH: CPT

## 2018-01-04 RX ORDER — DIPHENHYDRAMINE HYDROCHLORIDE 50 MG/ML
25 INJECTION, SOLUTION INTRAMUSCULAR; INTRAVENOUS
Status: COMPLETED | OUTPATIENT
Start: 2018-01-04 | End: 2018-01-04

## 2018-01-04 RX ORDER — HYDROMORPHONE HYDROCHLORIDE 2 MG/ML
1 INJECTION, SOLUTION INTRAMUSCULAR; INTRAVENOUS; SUBCUTANEOUS
Status: COMPLETED | OUTPATIENT
Start: 2018-01-04 | End: 2018-01-04

## 2018-01-04 RX ORDER — FENTANYL CITRATE 50 UG/ML
50 INJECTION, SOLUTION INTRAMUSCULAR; INTRAVENOUS
Status: COMPLETED | OUTPATIENT
Start: 2018-01-04 | End: 2018-01-04

## 2018-01-04 RX ORDER — ONDANSETRON 2 MG/ML
4 INJECTION INTRAMUSCULAR; INTRAVENOUS
Status: COMPLETED | OUTPATIENT
Start: 2018-01-04 | End: 2018-01-04

## 2018-01-04 RX ADMIN — ONDANSETRON HYDROCHLORIDE 4 MG: 2 INJECTION, SOLUTION INTRAMUSCULAR; INTRAVENOUS at 19:31

## 2018-01-04 RX ADMIN — SODIUM CHLORIDE 1000 ML: 900 INJECTION, SOLUTION INTRAVENOUS at 18:24

## 2018-01-04 RX ADMIN — HYDROMORPHONE HYDROCHLORIDE 1 MG: 2 INJECTION, SOLUTION INTRAMUSCULAR; INTRAVENOUS; SUBCUTANEOUS at 19:30

## 2018-01-04 RX ADMIN — FENTANYL CITRATE 50 MCG: 50 INJECTION, SOLUTION INTRAMUSCULAR; INTRAVENOUS at 18:24

## 2018-01-04 RX ADMIN — DIPHENHYDRAMINE HYDROCHLORIDE 25 MG: 50 INJECTION, SOLUTION INTRAMUSCULAR; INTRAVENOUS at 19:30

## 2018-01-04 RX ADMIN — SODIUM CHLORIDE 1000 ML: 900 INJECTION, SOLUTION INTRAVENOUS at 19:30

## 2018-01-04 NOTE — ED PROVIDER NOTES
EMERGENCY DEPARTMENT HISTORY AND PHYSICAL EXAM      Date: 1/4/2018  Patient Name: Laurel Vera    History of Presenting Illness     Chief Complaint   Patient presents with    Headache     x today    Rib Pain     x wednesday; reports BL rib pain, stating that she is having a sickle cell flair       History Provided By: Patient    HPI: Laurel Vera, 56 y.o. female with PMHx significant for HTN, sickle cell anemia, presents ambulatory to the ED for evaluation of sickle cell crisis x 2 days. Pt is complaining of progressively worsening, mild to moderate bilateral rib pain x 2 days. She rates her pain a 9/10 in severity. Pt also notes associated mild HA today. She reports taking Tylenol and Norco with minimal relief. Pt specifically denies any SOB, cough, fever, NV, or ABD pain. PCP: Oren Angulo MD    There are no other complaints, changes, or physical findings at this time. Current Outpatient Prescriptions   Medication Sig Dispense Refill    HYDROcodone-acetaminophen (NORCO)  mg tablet Take 1 Tab by mouth every six (6) hours as needed for Pain. Max Daily Amount: 4 Tabs. 120 Tab 0    cyclobenzaprine (FLEXERIL) 10 mg tablet Take 1 Tab by mouth three (3) times daily as needed for Muscle Spasm(s). 50 Tab 1    citalopram (CELEXA) 10 mg tablet TAKE 1 TABLET BY MOUTH EVERY NIGHT AT BEDTIME 90 Tab 3    losartan-hydroCHLOROthiazide (HYZAAR) 50-12.5 mg per tablet TAKE 1 TABLET BY MOUTH DAILY 90 Tab 3    fentaNYL (DURAGESIC) 75 mcg/hr 1 Patch by TransDERmal route every seventy-two (72) hours. Max Daily Amount: 1 Patch. 10 Patch 0    folic acid (FOLVITE) 1 mg tablet TAKE 1 TABLET BY MOUTH EVERY DAY 90 Tab 3    OMEPRAZOLE, BULK, Take 20 mg by mouth daily. 20 mg, PO, daily, 0 Refills      fluocinoNIDE (LIDEX) 0.05 % topical cream two (2) times daily as needed.          Past History     Past Medical History:  Past Medical History:   Diagnosis Date    Anemia     SC hemoglobinopathy    Chronic pain  Depression     Hypertension     Liver disease     hepatitis C; pt reports \"cured\"    Sickle cell disease ([address]        Past Surgical History:  Past Surgical History:   Procedure Laterality Date    BREAST SURGERY PROCEDURE UNLISTED      2 cysts removed from R breast    CHEST SURGERY PROCEDURE UNLISTED      status post thor for removal of a benign     HX CHOLECYSTECTOMY      HX ORTHOPAEDIC      bony curettage of an ostial myelitis focus       Family History:  Family History   Problem Relation Age of Onset    Hypertension Mother     Hypertension Father        Social History:  Social History   Substance Use Topics    Smoking status: Never Smoker    Smokeless tobacco: Never Used    Alcohol use No       Allergies: Allergies   Allergen Reactions    Dilaudid [Hydromorphone (Bulk)] Itching     Can tolerate with benadryl and ondansetron    Percocet [Oxycodone-Acetaminophen] Itching         Review of Systems   Review of Systems   Constitutional: Negative for chills and fever. Respiratory: Negative for cough and shortness of breath. Cardiovascular: Negative for chest pain. Gastrointestinal: Negative for abdominal pain, constipation, diarrhea, nausea and vomiting. Musculoskeletal: Positive for arthralgias (bilateral ribs). Neurological: Positive for headaches. Negative for weakness and numbness. All other systems reviewed and are negative. Physical Exam   Physical Exam   Constitutional: She is oriented to person, place, and time. She appears well-developed and well-nourished. HENT:   Head: Normocephalic and atraumatic. Very dry MM. Eyes: Conjunctivae and EOM are normal.   Neck: Normal range of motion. Neck supple. Cardiovascular: Normal rate and regular rhythm. Pulmonary/Chest: Effort normal and breath sounds normal. No respiratory distress. Abdominal: Soft. She exhibits no distension. There is no tenderness. Musculoskeletal: Normal range of motion.    TTP inferior anterior rib cage bilaterally. TTP lateral ribs. Neurological: She is alert and oriented to person, place, and time. Skin: Skin is warm and dry. There is pallor. Psychiatric: She has a normal mood and affect. Nursing note and vitals reviewed. Diagnostic Study Results     Labs -     Recent Results (from the past 12 hour(s))   RETICULOCYTE COUNT    Collection Time: 01/04/18  5:57 PM   Result Value Ref Range    Reticulocyte count 5.0 (H) 0.7 - 2.1 %   CBC W/O DIFF    Collection Time: 01/04/18  5:57 PM   Result Value Ref Range    WBC 6.5 3.6 - 11.0 K/uL    RBC 2.62 (L) 3.80 - 5.20 M/uL    HGB 8.5 (L) 11.5 - 16.0 g/dL    HCT 25.0 (L) 35.0 - 47.0 %    MCV 95.4 80.0 - 99.0 FL    MCH 32.4 26.0 - 34.0 PG    MCHC 34.0 30.0 - 36.5 g/dL    RDW 14.2 11.5 - 14.5 %    PLATELET 363 361 - 650 K/uL   METABOLIC PANEL, BASIC    Collection Time: 01/04/18  5:57 PM   Result Value Ref Range    Sodium 140 136 - 145 mmol/L    Potassium 3.8 3.5 - 5.1 mmol/L    Chloride 110 (H) 97 - 108 mmol/L    CO2 24 21 - 32 mmol/L    Anion gap 6 5 - 15 mmol/L    Glucose 87 65 - 100 mg/dL    BUN 9 6 - 20 MG/DL    Creatinine 0.91 0.55 - 1.02 MG/DL    BUN/Creatinine ratio 10 (L) 12 - 20      GFR est AA >60 >60 ml/min/1.73m2    GFR est non-AA >60 >60 ml/min/1.73m2    Calcium 9.0 8.5 - 10.1 MG/DL       Radiologic Studies -   CXR Results  (Last 48 hours)               01/04/18 1822  XR CHEST PA LAT Final result    Impression:  Impression:   No acute cardiopulmonary process. Narrative:  Chest PA and lateral       History: Chest pain. Sickle cell disease. Comparison: 5/22/2017       Findings: The lungs are well expanded. A thin scar is seen adjacent to the   right hilum. Scarring is also seen in the lateral right upper lobe. No focal   consolidation, pleural effusion, or pneumothorax. The cardiomediastinal   silhouette is unremarkable. Several age type vertebra are seen in the spine   consistent with sickle cell disease.  Surgical clips are seen in the right upper   quadrant. Medical Decision Making   I am the first provider for this patient. I reviewed the vital signs, available nursing notes, past medical history, past surgical history, family history and social history. Vital Signs-Reviewed the patient's vital signs. Patient Vitals for the past 12 hrs:   Temp Pulse Resp BP SpO2   01/04/18 1736 98.8 °F (37.1 °C) (!) 115 16 (!) 130/111 100 %     Records Reviewed: Nursing Notes and Old Medical Records    Provider Notes (Medical Decision Making):   Pt presents with bilateral rib pain. DDx: sickle cell, pain crisis, acute CP, acute bronchitis, PNA, costochondritis, dehydration. Will get labs, fluids, CXR, and check retic count. ED Course:   Initial assessment performed. The patients presenting problems have been discussed, and they are in agreement with the care plan formulated and outlined with them. I have encouraged them to ask questions as they arise throughout their visit. Procedure Note- Peripheral IV access with Ultrasound Guidance  6:05 PM  Angie Salter M.D gained IV access using  20 gauge needle because the patient had no vascular access. After cleaning the site with alcohol prep, the L bracial vein was localized with ultrasound guidance in an anterior approach. Line confirmation was obtained by direct visualization and good blood return. No anaesthetic was used. The line was successfully flushed with normal saline and was secured with transparent tape. The patient tolerated the procedure without complication. PROGRESS NOTE:   7:23 PM  Pt reevaluated. Pt reports minimal improvement with medications given in ED. Pt states she has had Dilaudid in the past with relief in symptoms. Disposition:  DISCHARGE NOTE  7:45 PM  The patient has been re-evaluated and is ready for discharge. Reviewed available results with patient. Counseled pt on diagnosis and care plan.  Pt has expressed understanding, and all questions have been answered. Pt agrees with plan and agrees to follow up as recommended, or return to the ED if their symptoms worsen. Discharge instructions have been provided and explained to the pt, along with reasons to return to the ED. PLAN:  1. Current Discharge Medication List        2. Follow-up Information     Follow up With Details Comments Debbie Marie MD Schedule an appointment as soon as possible for a visit If symptoms worsen [address]  [phone]          Return to ED if worse     Diagnosis     Clinical Impression:   1. Sickle cell crisis ([address]    2. Dehydration      Attestations: This note is prepared by Jouse Reyes, acting as Scribe for Dewey Wilson M.D. Dewey Wilson M.D: The scribe's documentation has been prepared under my direction and personally reviewed by me in its entirety. I confirm that the note above accurately reflects all work, treatment, procedures, and medical decision making performed by me.

## 2018-01-04 NOTE — ED NOTES
Pt has been stuck twice by ED medic and charge RN in attempts to establish PIV access without success. Dr. Lauro Osgood at bedside to attempt ultrasound guided IV access.

## 2018-01-05 ENCOUNTER — HOSPITAL ENCOUNTER (INPATIENT)
Age: 58
LOS: 11 days | Discharge: HOME | DRG: 812 | End: 2018-01-16
Attending: EMERGENCY MEDICINE | Admitting: INTERNAL MEDICINE
Payer: MEDICARE

## 2018-01-05 DIAGNOSIS — D57.00 SICKLE CELL CRISIS (HCC): Primary | ICD-10-CM

## 2018-01-05 LAB
ALBUMIN SERPL-MCNC: 4 G/DL (ref 3.5–5)
ALBUMIN/GLOB SERPL: 0.8 {RATIO} (ref 1.1–2.2)
ALP SERPL-CCNC: 100 U/L (ref 45–117)
ALT SERPL-CCNC: 16 U/L (ref 12–78)
ANION GAP SERPL CALC-SCNC: 6 MMOL/L (ref 5–15)
AST SERPL-CCNC: 28 U/L (ref 15–37)
BASOPHILS # BLD: 0 K/UL (ref 0–0.1)
BASOPHILS NFR BLD: 1 % (ref 0–1)
BILIRUB SERPL-MCNC: 1.9 MG/DL (ref 0.2–1)
BUN SERPL-MCNC: 6 MG/DL (ref 6–20)
BUN/CREAT SERPL: 7 (ref 12–20)
CALCIUM SERPL-MCNC: 8.8 MG/DL (ref 8.5–10.1)
CHLORIDE SERPL-SCNC: 107 MMOL/L (ref 97–108)
CO2 SERPL-SCNC: 27 MMOL/L (ref 21–32)
CREAT SERPL-MCNC: 0.88 MG/DL (ref 0.55–1.02)
EOSINOPHIL # BLD: 0.2 K/UL (ref 0–0.4)
EOSINOPHIL NFR BLD: 3 % (ref 0–7)
ERYTHROCYTE [DISTWIDTH] IN BLOOD BY AUTOMATED COUNT: 14 % (ref 11.5–14.5)
GLOBULIN SER CALC-MCNC: 5.1 G/DL (ref 2–4)
GLUCOSE SERPL-MCNC: 91 MG/DL (ref 65–100)
HCT VFR BLD AUTO: 24 % (ref 35–47)
HGB BLD-MCNC: 8.3 G/DL (ref 11.5–16)
LYMPHOCYTES # BLD: 2.7 K/UL (ref 0.8–3.5)
LYMPHOCYTES NFR BLD: 45 % (ref 12–49)
MCH RBC QN AUTO: 32.4 PG (ref 26–34)
MCHC RBC AUTO-ENTMCNC: 34.6 G/DL (ref 30–36.5)
MCV RBC AUTO: 93.8 FL (ref 80–99)
MONOCYTES # BLD: 0.8 K/UL (ref 0–1)
MONOCYTES NFR BLD: 13 % (ref 5–13)
NEUTS SEG # BLD: 2.3 K/UL (ref 1.8–8)
NEUTS SEG NFR BLD: 38 % (ref 32–75)
PLATELET # BLD AUTO: 211 K/UL (ref 150–400)
POTASSIUM SERPL-SCNC: 3.6 MMOL/L (ref 3.5–5.1)
PROT SERPL-MCNC: 9.1 G/DL (ref 6.4–8.2)
RBC # BLD AUTO: 2.56 M/UL (ref 3.8–5.2)
RETICS/RBC NFR AUTO: 4.2 % (ref 0.7–2.1)
SODIUM SERPL-SCNC: 140 MMOL/L (ref 136–145)
WBC # BLD AUTO: 5.9 K/UL (ref 3.6–11)

## 2018-01-05 PROCEDURE — 99284 EMERGENCY DEPT VISIT MOD MDM: CPT

## 2018-01-05 PROCEDURE — 96376 TX/PRO/DX INJ SAME DRUG ADON: CPT

## 2018-01-05 PROCEDURE — 85025 COMPLETE CBC W/AUTO DIFF WBC: CPT | Performed by: EMERGENCY MEDICINE

## 2018-01-05 PROCEDURE — 74011250636 HC RX REV CODE- 250/636: Performed by: EMERGENCY MEDICINE

## 2018-01-05 PROCEDURE — 96374 THER/PROPH/DIAG INJ IV PUSH: CPT

## 2018-01-05 PROCEDURE — 80053 COMPREHEN METABOLIC PANEL: CPT | Performed by: EMERGENCY MEDICINE

## 2018-01-05 PROCEDURE — 36415 COLL VENOUS BLD VENIPUNCTURE: CPT | Performed by: EMERGENCY MEDICINE

## 2018-01-05 PROCEDURE — 96375 TX/PRO/DX INJ NEW DRUG ADDON: CPT

## 2018-01-05 PROCEDURE — 85045 AUTOMATED RETICULOCYTE COUNT: CPT | Performed by: EMERGENCY MEDICINE

## 2018-01-05 PROCEDURE — 96361 HYDRATE IV INFUSION ADD-ON: CPT

## 2018-01-05 PROCEDURE — 65660000000 HC RM CCU STEPDOWN

## 2018-01-05 RX ORDER — HYDROMORPHONE HYDROCHLORIDE 2 MG/ML
2 INJECTION, SOLUTION INTRAMUSCULAR; INTRAVENOUS; SUBCUTANEOUS
Status: DISCONTINUED | OUTPATIENT
Start: 2018-01-05 | End: 2018-01-05

## 2018-01-05 RX ORDER — ONDANSETRON 2 MG/ML
4 INJECTION INTRAMUSCULAR; INTRAVENOUS
Status: DISCONTINUED | OUTPATIENT
Start: 2018-01-05 | End: 2018-01-16 | Stop reason: HOSPADM

## 2018-01-05 RX ORDER — HYDROMORPHONE HYDROCHLORIDE 2 MG/ML
2 INJECTION, SOLUTION INTRAMUSCULAR; INTRAVENOUS; SUBCUTANEOUS
Status: COMPLETED | OUTPATIENT
Start: 2018-01-05 | End: 2018-01-05

## 2018-01-05 RX ORDER — SODIUM CHLORIDE 9 MG/ML
125 INJECTION, SOLUTION INTRAVENOUS CONTINUOUS
Status: DISCONTINUED | OUTPATIENT
Start: 2018-01-05 | End: 2018-01-06

## 2018-01-05 RX ORDER — ACETAMINOPHEN 325 MG/1
650 TABLET ORAL
Status: DISCONTINUED | OUTPATIENT
Start: 2018-01-05 | End: 2018-01-16 | Stop reason: HOSPADM

## 2018-01-05 RX ORDER — HYDROMORPHONE HYDROCHLORIDE 2 MG/ML
2 INJECTION, SOLUTION INTRAMUSCULAR; INTRAVENOUS; SUBCUTANEOUS
Status: DISCONTINUED | OUTPATIENT
Start: 2018-01-05 | End: 2018-01-06

## 2018-01-05 RX ORDER — DIPHENHYDRAMINE HYDROCHLORIDE 50 MG/ML
25 INJECTION, SOLUTION INTRAMUSCULAR; INTRAVENOUS
Status: COMPLETED | OUTPATIENT
Start: 2018-01-05 | End: 2018-01-05

## 2018-01-05 RX ORDER — HEPARIN SODIUM 5000 [USP'U]/ML
5000 INJECTION, SOLUTION INTRAVENOUS; SUBCUTANEOUS EVERY 12 HOURS
Status: DISCONTINUED | OUTPATIENT
Start: 2018-01-05 | End: 2018-01-16 | Stop reason: HOSPADM

## 2018-01-05 RX ORDER — DIPHENHYDRAMINE HYDROCHLORIDE 50 MG/ML
25 INJECTION, SOLUTION INTRAMUSCULAR; INTRAVENOUS
Status: DISCONTINUED | OUTPATIENT
Start: 2018-01-05 | End: 2018-01-09

## 2018-01-05 RX ADMIN — HYDROMORPHONE HYDROCHLORIDE 2 MG: 2 INJECTION INTRAMUSCULAR; INTRAVENOUS; SUBCUTANEOUS at 18:41

## 2018-01-05 RX ADMIN — SODIUM CHLORIDE 1000 ML: 900 INJECTION, SOLUTION INTRAVENOUS at 18:45

## 2018-01-05 RX ADMIN — DIPHENHYDRAMINE HYDROCHLORIDE 25 MG: 50 INJECTION, SOLUTION INTRAMUSCULAR; INTRAVENOUS at 22:50

## 2018-01-05 RX ADMIN — HYDROMORPHONE HYDROCHLORIDE 2 MG: 2 INJECTION, SOLUTION INTRAMUSCULAR; INTRAVENOUS; SUBCUTANEOUS at 22:50

## 2018-01-05 RX ADMIN — DIPHENHYDRAMINE HYDROCHLORIDE 25 MG: 50 INJECTION, SOLUTION INTRAMUSCULAR; INTRAVENOUS at 20:19

## 2018-01-05 RX ADMIN — HYDROMORPHONE HYDROCHLORIDE 2 MG: 2 INJECTION INTRAMUSCULAR; INTRAVENOUS; SUBCUTANEOUS at 20:35

## 2018-01-05 RX ADMIN — DIPHENHYDRAMINE HYDROCHLORIDE 25 MG: 50 INJECTION, SOLUTION INTRAMUSCULAR; INTRAVENOUS at 20:53

## 2018-01-05 NOTE — IP AVS SNAPSHOT
[address] [facility] 
[phone] Patient: Cesar Gonsales MRN: [id number] :1960 You are allergic to the following Allergen Reactions Dilaudid (Hydromorphone (Bulk)) Itching Can tolerate with benadryl and ondansetron Percocet (Oxycodone-Acetaminophen) Itching Recent Documentation Height Weight BMI OB Status Smoking Status 1.778 m 86.4 kg 27.33 kg/m2 Postmenopausal Never Smoker Emergency Contacts  (Rel.) Home Phone Work Phone Mobile Phone [facility] [place] [phone] -- [phone] Sherorn Lowe (Daughter) -- -- [phone] About your hospitalization You were admitted on:  2018 You last received care in the:  [address] You were discharged on:  2018 Why you were hospitalized Your primary diagnosis was:  Sickle Cell Pain Crisis (Hcc) Your diagnoses also included:  Venous Insufficiency Providers Seen During Your Hospitalization Provider Specialty Primary office phone Emilio Gonzalez DO Emergency Medicine [phone] Jack Bonilla MD Internal Medicine [phone] Your Primary Care Physician (PCP) Primary Care Physician Office Phone Office Fax [address] [facility] [phone] Follow-up Information Follow up With Details Comments Contact Info Jack Bonilla MD On 2018 12;00pm  hospital follow-up   Please call sooner if needed [place] [address] 
[phone] Appointments for Next 14 days 2018 12:00 PM  OFFICE VISIT [facility] Srivast[facility] My Medications TAKE these medications as instructed Instructions Each Dose to Equal  
 Morning Noon Evening Bedtime  
 citalopram 10 mg tablet Commonly known as:  Michael Escobedo Your last dose was: Your next dose is: TAKE 1 TABLET BY MOUTH EVERY NIGHT AT BEDTIME  
     
   
   
   
  
 cyclobenzaprine 10 mg tablet Commonly known as:  FLEXERIL Your last dose was: Your next dose is: Take 1 Tab by mouth three (3) times daily as needed for Muscle Spasm(s). 10 mg  
    
   
   
   
  
 fentaNYL 75 mcg/hr Commonly known as:  Colby Alcantara Your last dose was: Your next dose is:    
   
   
 1 Patch by TransDERmal route every seventy-two (72) hours. Max Daily Amount: 1 Patch. 1 Patch  
    
   
   
   
  
 fluocinoNIDE 0.05 % topical cream  
Commonly known as:  LIDEX Your last dose was: Your next dose is:    
   
   
 two (2) times daily as needed. folic acid 1 mg tablet Commonly known as:  [employer] Your last dose was: Your next dose is: TAKE 1 TABLET BY MOUTH EVERY DAY  
     
   
   
   
  
 HYDROcodone-acetaminophen  mg tablet Commonly known as:  Garret Castrejon Your last dose was: Your next dose is: Take 1 Tab by mouth every six (6) hours as needed for Pain. Max Daily Amount: 4 Tabs. Indications: sickl;e cell crisis 1 Tab  
    
   
   
   
  
 losartan-hydroCHLOROthiazide 50-12.5 mg per tablet Commonly known as:  HYZAAR Your last dose was: Your next dose is: TAKE 1 TABLET BY MOUTH DAILY  
     
   
   
   
  
 OMEPRAZOLE PO Your last dose was: Your next dose is: Take 20 mg by mouth. 20 mg Where to Get Your Medications Information on where to get these meds will be given to you by the nurse or doctor. ! Ask your nurse or doctor about these medications  
  fentaNYL 75 mcg/hr HYDROcodone-acetaminophen  mg tablet Discharge Instructions General Discharge Instructions Patient ID: Flores Found [id number] 56 y.o. 
1960 Patient Instructions The following personal items were collected during your admission and were returned to you. Take Home Medications What to do at [facility] Recommended diet: Regular Diet Recommended activity: Activity as tolerated Follow-up with Oren Angulo MD  in 1 week. MyChart Activation Thank you for requesting access to [employer]. Please follow the instructions below to securely access and download your online medical record. [employer] allows you to send messages to your doctor, view your test results, renew your prescriptions, schedule appointments, and more. How Do I Sign Up? 1. In your internet browser, go to [email or web address] 
2. Click on the First Time User? Click Here link in the Sign In box. You will be redirect to the New Member Sign Up page. 3. Enter your [employer] Access Code exactly as it appears below. You will not need to use this code after youve completed the sign-up process. If you do not sign up before the expiration date, you must request a new code. [employer] Access Code: [email or web address] Expires: 2018 12:25 PM (This is the date your [employer] access code will ) 4. Enter the last four digits of your Social Security Number (xxxx) and Date of Birth (mm/dd/yyyy) as indicated and click Submit. You will be taken to the next sign-up page. 5. Create a [employer] ID. This will be your [employer] login ID and cannot be changed, so think of one that is secure and easy to remember. 6. Create a [employer] password. You can change your password at any time. 7. Enter your Password Reset Question and Answer. This can be used at a later time if you forget your password. 8. Enter your e-mail address. You will receive e-mail notification when new information is available in [address] 9. Click Sign Up. You can now view and download portions of your medical record. 10. Click the Download Summary menu link to download a portable copy of your medical information. Additional Information If you have questions, please visit the Frequently Asked Questions section of the [employer] website at [email or web address] Remember, [employer] is NOT to be used for urgent needs. For medical emergencies, dial 911. DISCHARGE SUMMARY from Nurse PATIENT INSTRUCTIONS: 
 
After general anesthesia or intravenous sedation, for 24 hours or while taking prescription Narcotics: · Limit your activities · Do not drive and operate hazardous machinery · Do not make important personal or business decisions · Do  not drink alcoholic beverages · If you have not urinated within 8 hours after discharge, please contact your surgeon on call. Report the following to your surgeon: 
· Excessive pain, swelling, redness or odor of or around the surgical area · Temperature over 100.5 · Nausea and vomiting lasting longer than 4 hours or if unable to take medications · Any signs of decreased circulation or nerve impairment to extremity: change in color, persistent  numbness, tingling, coldness or increase pain · Any questions What to do at Home: *  Please give a list of your current medications to your Primary Care Provider. *  Please update this list whenever your medications are discontinued, doses are 
    changed, or new medications (including over-the-counter products) are added. *  Please carry medication information at all times in case of emergency situations. These are general instructions for a healthy lifestyle: No smoking/ No tobacco products/ Avoid exposure to second hand smoke Surgeon General's Warning:  Quitting smoking now greatly reduces serious risk to your health. Obesity, smoking, and sedentary lifestyle greatly increases your risk for illness A healthy diet, regular physical exercise & weight monitoring are important for maintaining a healthy lifestyle You may be retaining fluid if you have a history of heart failure or if you experience any of the following symptoms:  Weight gain of 3 pounds or more overnight or 5 pounds in a week, increased swelling in our hands or feet or shortness of breath while lying flat in bed. Please call your doctor as soon as you notice any of these symptoms; do not wait until your next office visit. Recognize signs and symptoms of STROKE: 
 
F-face looks uneven A-arms unable to move or move unevenly S-speech slurred or non-existent T-time-call 911 as soon as signs and symptoms begin-DO NOT go Back to bed or wait to see if you get better-TIME IS BRAIN. Warning Signs of HEART ATTACK Call 911 if you have these symptoms: 
? Chest discomfort. Most heart attacks involve discomfort in the center of the chest that lasts more than a few minutes, or that goes away and comes back. It can feel like uncomfortable pressure, squeezing, fullness, or pain. ? Discomfort in other areas of the upper body. Symptoms can include pain or discomfort in one or both arms, the back, neck, jaw, or stomach. ? Shortness of breath with or without chest discomfort. ? Other signs may include breaking out in a cold sweat, nausea, or lightheadedness. Don't wait more than five minutes to call [address]! Fast action can save your life. Calling 911 is almost always the fastest way to get lifesaving treatment. Emergency Medical Services staff can begin treatment when they arrive  up to an hour sooner than if someone gets to the hospital by car. The discharge information has been reviewed with the patient. The patient verbalized understanding. Discharge medications reviewed with the patient and appropriate educational materials and side effects teaching were provided.  
___________________________________________________________________________ ________________________________________________________ Information obtained by : 
I understand that if any problems occur once I am at home I am to contact my physician. I understand and acknowledge receipt of the instructions indicated above. Physician's or R.N.'s Signature                                                                  Date/Time Patient or Representative Signature                                                          Date/Time Discharge Orders None ACO Transitions of Care Introducing [employer] [address] Miller offers a voluntary care coordination program to provide high quality service and care to [facility] fee-for-service beneficiaries. David Kuo was designed to help you enhance your health and well-being through the following services: ? Transitions of Care  support for individuals who are transitioning from one care setting to another (example: Hospital to home). ? Chronic and Complex Care Coordination  support for individuals and caregivers of those with serious or chronic illnesses or with more than one chronic (ongoing) condition and those who take a number of different medications. If you meet specific medical criteria, a [address] [place] [address] Rd may call you directly to coordinate your care with your primary care physician and your other care providers. For questions about the [facility] programs, please, contact your physicians office. For general questions or additional information about Accountable Care Organizations: 
Please visit www.medicare.gov/acos. html or call 1-800-MEDICARE (9-699.654.2436) [employer] users should call 0-687.880.1328. [employer] Announcement We are excited to announce that we are making your provider's discharge notes available to you in [employer]. You will see these notes when they are completed and signed by the physician that discharged you from your recent hospital stay. If you have any questions or concerns about any information you see in [employer], please call the Health Information Department where you were seen or reach out to your Primary Care Provider for more information about your plan of care. Introducing [facility]! [employer] introduces [employer] patient portal. Now you can access parts of your medical record, email your doctor's office, and request medication refills online. 1. In your internet browser, go to [email or web address] 2. Click on the First Time User? Click Here link in the Sign In box. You will see the New Member Sign Up page. 3. Enter your [employer] Access Code exactly as it appears below. You will not need to use this code after youve completed the sign-up process. If you do not sign up before the expiration date, you must request a new code. · [employer] Access Code: [email or web address] Expires: 1/29/2018 12:25 PM 
 
4. Enter the last four digits of your Social Security Number (xxxx) and Date of Birth (mm/dd/yyyy) as indicated and click Submit. You will be taken to the next sign-up page. 5. Create a [employer] ID. This will be your [employer] login ID and cannot be changed, so think of one that is secure and easy to remember. 6. Create a [employer] password. You can change your password at any time. 7. Enter your Password Reset Question and Answer. This can be used at a later time if you forget your password. 8. Enter your e-mail address. You will receive e-mail notification when new information is available in [address] 9. Click Sign Up. You can now view and download portions of your medical record. 10. Click the Download Summary menu link to download a portable copy of your medical information. If you have questions, please visit the Frequently Asked Questions section of the [employer] website. Remember, MyChart is NOT to be used for urgent needs. For medical emergencies, dial 911. Now available from your iPhone and Android! General Information Please provide this summary of care documentation to your next provider. Patient Signature:  ____________________________________________________________ Date:  ____________________________________________________________  
  
[facility] Provider Signature:  ____________________________________________________________ Date:  ____________________________________________________________

## 2018-01-05 NOTE — ED PROVIDER NOTES
EMERGENCY DEPARTMENT HISTORY AND PHYSICAL EXAM      Date: 1/5/2018  Patient Name: Ayush Ahuja    History of Presenting Illness     Chief Complaint   Patient presents with    Sickle Cell Crisis     pt c/o reported pain to ribs and back from sickle cell crisis. Seen in ED yesterday, reported she felt a little better at discharge but feeling worse today. History Provided By: Patient    HPI: Ayush Ahuja, 56 y.o. female with PMHx significant for depression, HTN, anemia, liver disease, and sickle cell disease, presents via EMS to the ED for further evaluation of sickle cell induced back pain, BL sided rib pain, and a posterior headache as well. She expresses that she was seen in the ED yesterday for the same endorsing that her pain was well managed by the time of discharge. The pt discloses that she has been taking Hydrocodone for her continued discomfort today WNR leading her back to the ED. She believes that her sickle cell crisis was triggered by the cold weather. She denies any fevers, chills, cough, chest pain, SOB, abdominal pain, nausea, vomiting, or diarrhea. PCP: Budd Spatz, MD    There are no other complaints, changes, or physical findings at this time. Current Outpatient Prescriptions   Medication Sig Dispense Refill    OMEPRAZOLE PO Take 20 mg by mouth.  HYDROcodone-acetaminophen (NORCO)  mg tablet Take 1 Tab by mouth every six (6) hours as needed for Pain. Max Daily Amount: 4 Tabs. 120 Tab 0    cyclobenzaprine (FLEXERIL) 10 mg tablet Take 1 Tab by mouth three (3) times daily as needed for Muscle Spasm(s). 50 Tab 1    citalopram (CELEXA) 10 mg tablet TAKE 1 TABLET BY MOUTH EVERY NIGHT AT BEDTIME 90 Tab 3    losartan-hydroCHLOROthiazide (HYZAAR) 50-12.5 mg per tablet TAKE 1 TABLET BY MOUTH DAILY 90 Tab 3    folic acid (FOLVITE) 1 mg tablet TAKE 1 TABLET BY MOUTH EVERY DAY 90 Tab 3    fluocinoNIDE (LIDEX) 0.05 % topical cream two (2) times daily as needed. Past History     Past Medical History:  Past Medical History:   Diagnosis Date    Anemia     SC hemoglobinopathy    Chronic pain     Depression     Hypertension     Liver disease     hepatitis C; pt reports \"cured\"    Sickle cell disease ([address]        Past Surgical History:  Past Surgical History:   Procedure Laterality Date    BREAST SURGERY PROCEDURE UNLISTED      2 cysts removed from R breast    CHEST SURGERY PROCEDURE UNLISTED      status post thor for removal of a benign     HX CHOLECYSTECTOMY      HX ORTHOPAEDIC      bony curettage of an ostial myelitis focus       Family History:  Family History   Problem Relation Age of Onset    Hypertension Mother     Hypertension Father        Social History:  Social History   Substance Use Topics    Smoking status: Never Smoker    Smokeless tobacco: Never Used    Alcohol use No       Allergies: Allergies   Allergen Reactions    Dilaudid [Hydromorphone (Bulk)] Itching     Can tolerate with benadryl and ondansetron    Percocet [Oxycodone-Acetaminophen] Itching         Review of Systems   Review of Systems   Constitutional: Negative for chills, fatigue and fever. HENT: Negative. Eyes: Negative. Respiratory: Negative for cough, shortness of breath and wheezing. Cardiovascular: Negative for chest pain and leg swelling. Gastrointestinal: Negative for abdominal pain, blood in stool, constipation, diarrhea, nausea and vomiting. Endocrine: Negative. Genitourinary: Positive for flank pain (BL sides, rib pain ). Negative for difficulty urinating and dysuria. Musculoskeletal: Positive for back pain. Skin: Negative for rash. Allergic/Immunologic: Negative. Neurological: Positive for headaches. Negative for weakness and numbness. Hematological: Negative. Psychiatric/Behavioral: Negative. Physical Exam   Physical Exam   Constitutional: She is oriented to person, place, and time. She appears well-developed and well-nourished. No distress. Appears uncomfortable      HENT:   Head: Normocephalic and atraumatic. Mouth/Throat: Oropharynx is clear and moist.   Eyes: Conjunctivae and EOM are normal.   Neck: Neck supple. No JVD present. No tracheal deviation present. Cardiovascular: Normal rate, regular rhythm and intact distal pulses. Exam reveals no gallop and no friction rub. No murmur heard. Pulmonary/Chest: Effort normal and breath sounds normal. No stridor. No respiratory distress. She has no wheezes. Abdominal: Soft. Bowel sounds are normal. She exhibits no distension and no mass. There is no tenderness. There is no guarding. Musculoskeletal: Normal range of motion. She exhibits no edema or tenderness. No deformity   Neurological: She is alert and oriented to person, place, and time. She has normal strength. No focal deficits   Skin: Skin is warm, dry and intact. No rash noted. Psychiatric: She has a normal mood and affect. Her behavior is normal. Judgment and thought content normal.   Nursing note and vitals reviewed. Diagnostic Study Results     Labs -     Recent Results (from the past 12 hour(s))   CBC WITH AUTOMATED DIFF    Collection Time: 01/05/18  5:48 PM   Result Value Ref Range    WBC 5.9 3.6 - 11.0 K/uL    RBC 2.56 (L) 3.80 - 5.20 M/uL    HGB 8.3 (L) 11.5 - 16.0 g/dL    HCT 24.0 (L) 35.0 - 47.0 %    MCV 93.8 80.0 - 99.0 FL    MCH 32.4 26.0 - 34.0 PG    MCHC 34.6 30.0 - 36.5 g/dL    RDW 14.0 11.5 - 14.5 %    PLATELET 844 935 - 267 K/uL    NEUTROPHILS 38 32 - 75 %    LYMPHOCYTES 45 12 - 49 %    MONOCYTES 13 5 - 13 %    EOSINOPHILS 3 0 - 7 %    BASOPHILS 1 0 - 1 %    ABS. NEUTROPHILS 2.3 1.8 - 8.0 K/UL    ABS. LYMPHOCYTES 2.7 0.8 - 3.5 K/UL    ABS. MONOCYTES 0.8 0.0 - 1.0 K/UL    ABS. EOSINOPHILS 0.2 0.0 - 0.4 K/UL    ABS.  BASOPHILS 0.0 0.0 - 0.1 K/UL   RETICULOCYTE COUNT    Collection Time: 01/05/18  5:48 PM   Result Value Ref Range    Reticulocyte count 4.2 (H) 0.7 - 2.1 %   METABOLIC PANEL, COMPREHENSIVE    Collection Time: 01/05/18  5:48 PM   Result Value Ref Range    Sodium 140 136 - 145 mmol/L    Potassium 3.6 3.5 - 5.1 mmol/L    Chloride 107 97 - 108 mmol/L    CO2 27 21 - 32 mmol/L    Anion gap 6 5 - 15 mmol/L    Glucose 91 65 - 100 mg/dL    BUN 6 6 - 20 MG/DL    Creatinine 0.88 0.55 - 1.02 MG/DL    BUN/Creatinine ratio 7 (L) 12 - 20      GFR est AA >60 >60 ml/min/1.73m2    GFR est non-AA >60 >60 ml/min/1.73m2    Calcium 8.8 8.5 - 10.1 MG/DL    Bilirubin, total 1.9 (H) 0.2 - 1.0 MG/DL    ALT (SGPT) 16 12 - 78 U/L    AST (SGOT) 28 15 - 37 U/L    Alk. phosphatase 100 45 - 117 U/L    Protein, total 9.1 (H) 6.4 - 8.2 g/dL    Albumin 4.0 3.5 - 5.0 g/dL    Globulin 5.1 (H) 2.0 - 4.0 g/dL    A-G Ratio 0.8 (L) 1.1 - 2.2         Radiologic Studies -   CXR Results  (Last 48 hours)               01/04/18 1822  XR CHEST PA LAT Final result    Impression:  Impression:   No acute cardiopulmonary process. Narrative:  Chest PA and lateral       History: Chest pain. Sickle cell disease. Comparison: 5/22/2017       Findings: The lungs are well expanded. A thin scar is seen adjacent to the   right hilum. Scarring is also seen in the lateral right upper lobe. No focal   consolidation, pleural effusion, or pneumothorax. The cardiomediastinal   silhouette is unremarkable. Several age type vertebra are seen in the spine   consistent with sickle cell disease. Surgical clips are seen in the right upper   quadrant. Medical Decision Making   I am the first provider for this patient. I reviewed the vital signs, available nursing notes, past medical history, past surgical history, family history and social history. Vital Signs-Reviewed the patient's vital signs.   Patient Vitals for the past 12 hrs:   Temp Pulse Resp BP SpO2   01/05/18 2045 - 91 13 142/88 97 %   01/05/18 2030 - 87 22 164/90 97 %   01/05/18 2015 - 87 13 (!) 162/94 99 %   01/05/18 2000 - 82 14 (!) 163/96 100 %   01/05/18 1915 - 90 12 (!) 157/95 97 %   01/05/18 1900 - 89 13 (!) 154/96 97 %   01/05/18 1830 - 90 12 (!) 155/92 100 %   01/05/18 1815 - 91 23 (!) 160/98 100 %   01/05/18 1800 - 88 19 (!) 163/97 100 %   01/05/18 1745 - 87 15 (!) 154/96 100 %   01/05/18 1733 - 91 15 - 96 %   01/05/18 1732 - - - 161/90 100 %   01/05/18 1628 99.3 °F (37.4 °C) 99 16 (!) 169/115 100 %       Pulse Oximetry Analysis - 100% on room air    Cardiac Monitor:   Rate: 99 bpm  Rhythm: Normal Sinus Rhythm        Records Reviewed: Old Medical Records    Provider Notes (Medical Decision Making):     Pt presenting with sickle cell crisis, has no CP or SOB--low suspicion for acute chest. Pt was seen yesterday and labs were WNL. Will repeat labs, treat with ivf and analgesia, then reassess. If patient has no improvement, will admit. ED Course:   Initial assessment performed. The patients presenting problems have been discussed, and they are in agreement with the care plan formulated and outlined with them. I have encouraged them to ask questions as they arise throughout their visit. Progress Notes:    7:40 PM  The pt has been re-evaluated. She expresses that she is still having pain and is starting to itch. Will treat the pt's sx and continue to monitor. 8:58 PM  The pt has been re-evaluated. She expresses that her pain is persisting at this time. Will consult hospitalist for admission. 9:24 PM  The pt has been re-evaluated. She was updated on the warrant for admission. The pt is agreeable to admission. CONSULT NOTE:   9:24 PM  Kun Gupta DO spoke with Dr. Barbara Gandara,   Specialty: Hospitalist  Discussed pt's hx, disposition, and available diagnostic and imaging results. Reviewed care plans. Consultant will evaluate pt for admission. Written by OZZIE Hernándezibjake, as dictated by Kun Gupta DO. Critical Care Time: 0 minutes    Disposition:  Admit Note:  9:24 PM  Patient is being admitted to the hospital by Dr. Barbara Gandara.  The results of their tests and reasons for their admission have been discussed with the patient and/or available family. They convey their agreement and understanding for the need to be admitted and for their admission diagnosis. Written by OZZIE Villarreal, as dictated by Latasha Yun DO. PLAN:  1. Admission  Diagnosis     Clinical Impression:   1. Sickle cell crisis ([facility] [address]        Attestations:    Attestation: This note is prepared by Julianna Harmon. Khari, acting as Scribe for Latasha Yun DO. Latasha Yun DO: The scribe's documentation has been prepared under my direction and personally reviewed by me in its entirety. I confirm that the note above accurately reflects all work, treatment, procedures, and medical decision making performed by me.

## 2018-01-05 NOTE — ED NOTES
Patient presents to ED with C/O abd pain that radiates to the ribs and back due to a sickle cell crisis that started Tuesday, but got worse today. The pt stated she was seen in this ED yesterday for the same symptoms, given Dilaudid, which helped the pain, and sent home. The stated the pain came back this morning. The pt denies chest pain, SOB, dizziness, N/V/D, fever, chills, and urinary symptoms. Patient is A&Ox3, side rails up, call bell w/in reach, and aware of plan of care. The patient is in NAD.

## 2018-01-05 NOTE — DISCHARGE INSTRUCTIONS
Oral Rehydration: Care Instructions  Your Care Instructions    Dehydration occurs when your body loses too much water. This can happen if you do not drink enough fluids or lose a lot of fluid due to diarrhea, vomiting, or sweating. Being dehydrated can cause health problems and can even be life-threatening. To replace lost fluids, you need to drink liquid that contains special chemicals called electrolytes. Electrolytes keep your body working well. Plain water does not have electrolytes. You also need to rest to prevent more fluid loss. Replacing water and electrolytes (oral rehydration) completely takes about 36 hours. But you should feel better within a few hours. Follow-up care is a key part of your treatment and safety. Be sure to make and go to all appointments, and call your doctor if you are having problems. It's also a good idea to know your test results and keep a list of the medicines you take. How can you care for yourself at home? · Take frequent sips of a drink such as Gatorade, Powerade, or other rehydration drinks that your doctor suggests. These replace both fluid and important chemicals (electrolytes) you need for balance in your blood. · Drink 2 quarts of cool liquid over 2 to 4 hours. You should have at least 10 glasses of liquid a day to replace lost fluid. If you have kidney, heart, or liver disease and have to limit fluids, talk with your doctor before you increase the amount of fluids you drink. · Make your own drink. Measure everything carefully. The drink may not work well or may even be harmful if the amounts are off. ¨ 1 quart water  ¨ ½ teaspoon salt  ¨ 6 teaspoons sugar  · Do not drink liquid with caffeine, such as coffee and mathew. · Do not drink any alcohol. It can make you dehydrated. · Drink plenty of fluids, enough so that your urine is light yellow or clear like water.  If you have kidney, heart, or liver disease and have to limit fluids, talk with your doctor before you increase the amount of fluids you drink. When should you call for help? Call 911 anytime you think you may need emergency care. For example, call if:  ? · You have signs of severe dehydration, such as:  ¨ You are confused or unable to stay awake. ¨ You passed out (lost consciousness). ?Call your doctor now or seek immediate medical care if:  ? · You still have signs of dehydration. You have sunken eyes and a dry mouth, and you pass only a little dark urine. ? · You are dizzy or lightheaded, or you feel like you may faint. ? · You are not able to keep down fluids. ? Watch closely for changes in your health, and be sure to contact your doctor if:  ? · You do not get better as expected. Where can you learn more? Go to http://laurie-jung.info/. Enter I040 in the search box to learn more about \"Oral Rehydration: Care Instructions. \"  Current as of: March 20, 2017  Content Version: 11.4  © 2323-9742 [employer]. Care instructions adapted under license by [employer] (which disclaims liability or warranty for this information). If you have questions about a medical condition or this instruction, always ask your healthcare professional. [address] any warranty or liability for your use of this information.

## 2018-01-05 NOTE — ED NOTES
Discharge instructions reviewed with pt. Discharge instructions given to pt per Dr. Morales Miss. Pt able to return/verbalize discharge instructions. Copy of discharge instructions given. Pt condition stable, respiratory status within normal limits, neuro status intact. Pt ambulatory, WC offered and pt refused out of ER, accompanied by daughter.

## 2018-01-06 LAB
ALBUMIN SERPL-MCNC: 3.9 G/DL (ref 3.5–5)
ALBUMIN/GLOB SERPL: 0.8 {RATIO} (ref 1.1–2.2)
ALP SERPL-CCNC: 95 U/L (ref 45–117)
ALT SERPL-CCNC: 17 U/L (ref 12–78)
ANION GAP SERPL CALC-SCNC: 5 MMOL/L (ref 5–15)
ARTERIAL PATENCY WRIST A: YES
AST SERPL-CCNC: 29 U/L (ref 15–37)
BASE DEFICIT BLDA-SCNC: 2.9 MMOL/L
BASOPHILS # BLD: 0 K/UL (ref 0–0.1)
BASOPHILS NFR BLD: 0 % (ref 0–1)
BDY SITE: ABNORMAL
BILIRUB SERPL-MCNC: 2.2 MG/DL (ref 0.2–1)
BREATHS.SPONTANEOUS ON VENT: 18
BUN SERPL-MCNC: 7 MG/DL (ref 6–20)
BUN/CREAT SERPL: 6 (ref 12–20)
CALCIUM SERPL-MCNC: 8.6 MG/DL (ref 8.5–10.1)
CHLORIDE SERPL-SCNC: 107 MMOL/L (ref 97–108)
CO2 SERPL-SCNC: 28 MMOL/L (ref 21–32)
CREAT SERPL-MCNC: 1.12 MG/DL (ref 0.55–1.02)
CRP SERPL-MCNC: <0.29 MG/DL (ref 0–0.6)
EOSINOPHIL # BLD: 0.3 K/UL (ref 0–0.4)
EOSINOPHIL NFR BLD: 2 % (ref 0–7)
ERYTHROCYTE [DISTWIDTH] IN BLOOD BY AUTOMATED COUNT: 14.2 % (ref 11.5–14.5)
GAS FLOW.O2 O2 DELIVERY SYS: 2 L/MIN
GLOBULIN SER CALC-MCNC: 4.9 G/DL (ref 2–4)
GLUCOSE SERPL-MCNC: 87 MG/DL (ref 65–100)
HCO3 BLDA-SCNC: 24 MMOL/L (ref 22–26)
HCT VFR BLD AUTO: 23.8 % (ref 35–47)
HGB BLD-MCNC: 8.3 G/DL (ref 11.5–16)
LYMPHOCYTES # BLD: 5.6 K/UL (ref 0.8–3.5)
LYMPHOCYTES NFR BLD: 47 % (ref 12–49)
MCH RBC QN AUTO: 33.9 PG (ref 26–34)
MCHC RBC AUTO-ENTMCNC: 34.9 G/DL (ref 30–36.5)
MCV RBC AUTO: 97.1 FL (ref 80–99)
MONOCYTES # BLD: 1 K/UL (ref 0–1)
MONOCYTES NFR BLD: 9 % (ref 5–13)
NEUTS SEG # BLD: 5 K/UL (ref 1.8–8)
NEUTS SEG NFR BLD: 42 % (ref 32–75)
PCO2 BLDA: 47 MMHG (ref 35–45)
PH BLDA: 7.32 [PH] (ref 7.35–7.45)
PLATELET # BLD AUTO: 156 K/UL (ref 150–400)
PO2 BLDA: 96 MMHG (ref 80–100)
POTASSIUM SERPL-SCNC: 3.2 MMOL/L (ref 3.5–5.1)
PROT SERPL-MCNC: 8.8 G/DL (ref 6.4–8.2)
RBC # BLD AUTO: 2.45 M/UL (ref 3.8–5.2)
RETICS/RBC NFR AUTO: 3.8 % (ref 0.7–2.1)
SAO2 % BLD: 97 % (ref 92–97)
SAO2% DEVICE SAO2% SENSOR NAME: ABNORMAL
SODIUM SERPL-SCNC: 140 MMOL/L (ref 136–145)
SPECIMEN SITE: ABNORMAL
WBC # BLD AUTO: 11.9 K/UL (ref 3.6–11)

## 2018-01-06 PROCEDURE — 74011250636 HC RX REV CODE- 250/636: Performed by: INTERNAL MEDICINE

## 2018-01-06 PROCEDURE — 77010033678 HC OXYGEN DAILY

## 2018-01-06 PROCEDURE — 80053 COMPREHEN METABOLIC PANEL: CPT | Performed by: INTERNAL MEDICINE

## 2018-01-06 PROCEDURE — 86140 C-REACTIVE PROTEIN: CPT | Performed by: INTERNAL MEDICINE

## 2018-01-06 PROCEDURE — 82803 BLOOD GASES ANY COMBINATION: CPT | Performed by: INTERNAL MEDICINE

## 2018-01-06 PROCEDURE — 85045 AUTOMATED RETICULOCYTE COUNT: CPT | Performed by: INTERNAL MEDICINE

## 2018-01-06 PROCEDURE — 36600 WITHDRAWAL OF ARTERIAL BLOOD: CPT | Performed by: INTERNAL MEDICINE

## 2018-01-06 PROCEDURE — 85025 COMPLETE CBC W/AUTO DIFF WBC: CPT | Performed by: INTERNAL MEDICINE

## 2018-01-06 PROCEDURE — 36415 COLL VENOUS BLD VENIPUNCTURE: CPT | Performed by: INTERNAL MEDICINE

## 2018-01-06 PROCEDURE — 74011250637 HC RX REV CODE- 250/637: Performed by: INTERNAL MEDICINE

## 2018-01-06 PROCEDURE — 65660000000 HC RM CCU STEPDOWN

## 2018-01-06 RX ORDER — VALSARTAN 40 MG/1
40 TABLET ORAL DAILY
Status: DISCONTINUED | OUTPATIENT
Start: 2018-01-07 | End: 2018-01-16 | Stop reason: HOSPADM

## 2018-01-06 RX ORDER — POTASSIUM CHLORIDE 750 MG/1
40 TABLET, FILM COATED, EXTENDED RELEASE ORAL
Status: COMPLETED | OUTPATIENT
Start: 2018-01-06 | End: 2018-01-06

## 2018-01-06 RX ORDER — CITALOPRAM 20 MG/1
10 TABLET, FILM COATED ORAL
Status: DISCONTINUED | OUTPATIENT
Start: 2018-01-06 | End: 2018-01-16 | Stop reason: HOSPADM

## 2018-01-06 RX ORDER — PANTOPRAZOLE SODIUM 40 MG/1
40 TABLET, DELAYED RELEASE ORAL
Status: DISCONTINUED | OUTPATIENT
Start: 2018-01-06 | End: 2018-01-16 | Stop reason: HOSPADM

## 2018-01-06 RX ORDER — FOLIC ACID 1 MG/1
1 TABLET ORAL DAILY
Status: DISCONTINUED | OUTPATIENT
Start: 2018-01-06 | End: 2018-01-16 | Stop reason: HOSPADM

## 2018-01-06 RX ORDER — SODIUM CHLORIDE AND POTASSIUM CHLORIDE .9; .15 G/100ML; G/100ML
SOLUTION INTRAVENOUS CONTINUOUS
Status: DISCONTINUED | OUTPATIENT
Start: 2018-01-06 | End: 2018-01-08

## 2018-01-06 RX ORDER — HYDROMORPHONE HYDROCHLORIDE 2 MG/ML
1 INJECTION, SOLUTION INTRAMUSCULAR; INTRAVENOUS; SUBCUTANEOUS
Status: DISCONTINUED | OUTPATIENT
Start: 2018-01-06 | End: 2018-01-12

## 2018-01-06 RX ADMIN — DIPHENHYDRAMINE HYDROCHLORIDE 25 MG: 50 INJECTION, SOLUTION INTRAMUSCULAR; INTRAVENOUS at 06:37

## 2018-01-06 RX ADMIN — FOLIC ACID 1 MG: 1 TABLET ORAL at 08:54

## 2018-01-06 RX ADMIN — HYDROMORPHONE HYDROCHLORIDE 2 MG: 2 INJECTION INTRAMUSCULAR; INTRAVENOUS; SUBCUTANEOUS at 10:50

## 2018-01-06 RX ADMIN — HYDROMORPHONE HYDROCHLORIDE 2 MG: 2 INJECTION INTRAMUSCULAR; INTRAVENOUS; SUBCUTANEOUS at 06:38

## 2018-01-06 RX ADMIN — HYDROMORPHONE HYDROCHLORIDE 1 MG: 2 INJECTION INTRAMUSCULAR; INTRAVENOUS; SUBCUTANEOUS at 22:08

## 2018-01-06 RX ADMIN — DIPHENHYDRAMINE HYDROCHLORIDE 25 MG: 50 INJECTION, SOLUTION INTRAMUSCULAR; INTRAVENOUS at 10:47

## 2018-01-06 RX ADMIN — SODIUM CHLORIDE 125 ML/HR: 900 INJECTION, SOLUTION INTRAVENOUS at 00:54

## 2018-01-06 RX ADMIN — DIPHENHYDRAMINE HYDROCHLORIDE 25 MG: 50 INJECTION, SOLUTION INTRAMUSCULAR; INTRAVENOUS at 22:09

## 2018-01-06 RX ADMIN — PANTOPRAZOLE SODIUM 40 MG: 40 TABLET, DELAYED RELEASE ORAL at 06:39

## 2018-01-06 RX ADMIN — HYDROMORPHONE HYDROCHLORIDE 1 MG: 2 INJECTION INTRAMUSCULAR; INTRAVENOUS; SUBCUTANEOUS at 19:00

## 2018-01-06 RX ADMIN — HYDROMORPHONE HYDROCHLORIDE 2 MG: 2 INJECTION INTRAMUSCULAR; INTRAVENOUS; SUBCUTANEOUS at 02:26

## 2018-01-06 RX ADMIN — POTASSIUM CHLORIDE 40 MEQ: 750 TABLET, FILM COATED, EXTENDED RELEASE ORAL at 13:07

## 2018-01-06 RX ADMIN — CITALOPRAM HYDROBROMIDE 10 MG: 20 TABLET ORAL at 22:09

## 2018-01-06 RX ADMIN — SODIUM CHLORIDE 125 ML/HR: 900 INJECTION, SOLUTION INTRAVENOUS at 06:32

## 2018-01-06 RX ADMIN — DIPHENHYDRAMINE HYDROCHLORIDE 25 MG: 50 INJECTION, SOLUTION INTRAMUSCULAR; INTRAVENOUS at 19:00

## 2018-01-06 RX ADMIN — SODIUM CHLORIDE AND POTASSIUM CHLORIDE: 9; 1.49 INJECTION, SOLUTION INTRAVENOUS at 13:19

## 2018-01-06 RX ADMIN — HYDROMORPHONE HYDROCHLORIDE 1 MG: 2 INJECTION INTRAMUSCULAR; INTRAVENOUS; SUBCUTANEOUS at 15:01

## 2018-01-06 RX ADMIN — DIPHENHYDRAMINE HYDROCHLORIDE 25 MG: 50 INJECTION, SOLUTION INTRAMUSCULAR; INTRAVENOUS at 15:01

## 2018-01-06 RX ADMIN — DIPHENHYDRAMINE HYDROCHLORIDE 25 MG: 50 INJECTION, SOLUTION INTRAMUSCULAR; INTRAVENOUS at 02:25

## 2018-01-06 RX ADMIN — HEPARIN SODIUM 5000 UNITS: 5000 INJECTION, SOLUTION INTRAVENOUS; SUBCUTANEOUS at 00:51

## 2018-01-06 RX ADMIN — SODIUM CHLORIDE AND POTASSIUM CHLORIDE: 9; 1.49 INJECTION, SOLUTION INTRAVENOUS at 22:16

## 2018-01-06 RX ADMIN — HEPARIN SODIUM 5000 UNITS: 5000 INJECTION, SOLUTION INTRAVENOUS; SUBCUTANEOUS at 13:06

## 2018-01-06 NOTE — PROGRESS NOTES
RN informed Dr. Alicia Butler of patients blood gas results, no further orders received at this time. RN unable to obtain Hgb A1C, per Dr. Alicia Butler it is ok to add on to morning labs.

## 2018-01-06 NOTE — ED NOTES
Patient resting comfortably, side rails up, call bell w/in reach, no further needs expressed at this time, aware of POC. Pt eating ice. Ambar Ferraro MD approved pt to eat ice.

## 2018-01-06 NOTE — PROGRESS NOTES
RN observed patient desating into the 80's with continuous pulse ox. Patient easily recovers to 90s with taking a couple deep breaths. Patient was placed on 2L NC. Patient has been sitting up, talking on the phone and eating when this happens. Dr. Chrissy Last informed and is placing orders for arterial blood gas. Patient resting comfortably at this time. RN will continue to monitor.

## 2018-01-06 NOTE — PROGRESS NOTES
TRANSFER - IN REPORT:    Verbal report received from Anel(name) on Alba Carpio  being received from ED(unit) for routine progression of care      Report consisted of patients Situation, Background, Assessment and   Recommendations(SBAR). Information from the following report(s) SBAR, Kardex, Intake/Output, MAR, Recent Results and Med Rec Status was reviewed with the receiving nurse. Opportunity for questions and clarification was provided. Assessment completed upon patients arrival to unit and care assumed.      Primary Nurse Jose Regalado RN and SAMIR Miller performed a dual skin assessment on this patient No impairment noted  Nuno score is 23

## 2018-01-06 NOTE — H&P
Hospitalist Admission Note    NAME: Jordyn Fair   :  1960   MRN:  [id number]     Date/Time:  2018 11:44 PM    Patient PCP: Richard Diehl MD  ________________________________________________________________________    My assessment of this patient's clinical condition and my plan of care is as follows. Assessment / Plan:    Sickle cell pain crisis POA  -CXR clear  -pain is her typical pain syndrome.    -allergic to dilaudid but this is what works and what she wants. Will order premedication with IV benadryl. She has tolerated it this way. -IVF hydration  -follow CRP, retic, Hg and renal function    Hypertension   -continue ARB. Hold HCTZ     Hepatitis C   -completed [place]     GERD  -PPI     Code Status: full  Surrogate Decision Maker: daughter     DVT Prophylaxis: Lovenox  GI Prophylaxis: not indicated          Subjective:   CHIEF COMPLAINT:   Sickle cell pain    HISTORY OF PRESENT ILLNESS:     Mike Smyth is a 56 y.o.  female with sickle cell anemia who presented to the ER yesterday with 2 days of bilateral lower rib pain with radiation to her back. She was hydrated and discharged on dilaudid tabs but her pain got worse overnight and she returned to the ER today. Denies CP or SOB. We were asked to admit for work up and evaluation of the above problems.      Past Medical History:   Diagnosis Date    Anemia     SC hemoglobinopathy    Chronic pain     Depression     Hypertension     Liver disease     hepatitis C; pt reports \"cured\"    Sickle cell disease ([facility] [address]         Past Surgical History:   Procedure Laterality Date    BREAST SURGERY PROCEDURE UNLISTED      2 cysts removed from R breast    CHEST SURGERY PROCEDURE UNLISTED      status post thor for removal of a benign     HX CHOLECYSTECTOMY      HX ORTHOPAEDIC      bony curettage of an ostial myelitis focus       Social History   Substance Use Topics    Smoking status: Never Smoker    Smokeless tobacco: Never Used    Alcohol use No        Family History   Problem Relation Age of Onset    Hypertension Mother     Hypertension Father      Allergies   Allergen Reactions    Dilaudid [Hydromorphone (Bulk)] Itching     Can tolerate with benadryl and ondansetron    Percocet [Oxycodone-Acetaminophen] Itching        Prior to Admission medications    Medication Sig Start Date End Date Taking? Authorizing Provider   OMEPRAZOLE PO Take 20 mg by mouth. Yes Ean Porter MD   HYDROcodone-acetaminophen (NORCO)  mg tablet Take 1 Tab by mouth every six (6) hours as needed for Pain. Max Daily Amount: 4 Tabs. 12/4/17  Yes Margareth Colvin MD   cyclobenzaprine (FLEXERIL) 10 mg tablet Take 1 Tab by mouth three (3) times daily as needed for Muscle Spasm(s). 11/28/17  Yes Margareth Colvin MD   citalopram (CELEXA) 10 mg tablet TAKE 1 TABLET BY MOUTH EVERY NIGHT AT BEDTIME 11/28/17  Yes Margareth Colvin MD   losartan-hydroCHLOROthiazide [facility]) 50-12.5 mg per tablet TAKE 1 TABLET BY MOUTH DAILY 11/16/17  Yes Margareth Colvin MD   folic acid (FOLVITE) 1 mg tablet TAKE 1 TABLET BY MOUTH EVERY DAY 10/25/16  Yes Margareth Colvin MD   fluocinoNIDE (LIDEX) 0.05 % topical cream two (2) times daily as needed.  8/1/14   Historical Provider       REVIEW OF SYSTEMS:       Total of 12 systems reviewed as follows:       POSITIVE= underlined text  Negative = text not underlined  General:  fever, chills, sweats, generalized weakness, weight loss/gain,      loss of appetite   Eyes:    blurred vision, eye pain, loss of vision, double vision  ENT:    rhinorrhea, pharyngitis   Respiratory:   cough, sputum production, SOB, MARS, wheezing, pleuritic pain   Cardiology:   chest pain, palpitations, orthopnea, PND, edema, syncope   Gastrointestinal:  abdominal pain , N/V, diarrhea, dysphagia, constipation, bleeding   Genitourinary:  frequency, urgency, dysuria, hematuria, incontinence   Muskuloskeletal :  arthralgia, myalgia, back pain  Hematology:  easy bruising, nose or gum bleeding, lymphadenopathy   Dermatological: rash, ulceration, pruritis, color change / jaundice  Endocrine:   hot flashes or polydipsia   Neurological:  headache, dizziness, confusion, focal weakness, paresthesia,     Speech difficulties, memory loss, gait difficulty  Psychological: Feelings of anxiety, depression, agitation    Objective:   VITALS:    Visit Vitals    BP (!) 154/91    Pulse (!) 101    Temp 99.3 °F (37.4 °C)    Resp 17    Ht 5' 10\" (1.778 m)    Wt 86.4 kg (190 lb 7.6 oz)    SpO2 97%    BMI 27.33 kg/m2       PHYSICAL EXAM:    General:    Alert, cooperative, no distress, appears stated age. HEENT: Atraumatic, anicteric sclerae, pink conjunctivae     No oral ulcers, mucosa moist, throat clear, dentition fair  Neck:  Supple, symmetrical,  thyroid: non tender  Lungs:   Clear to auscultation bilaterally. No Wheezing or Rhonchi. No rales. Chest wall:  No tenderness  No Accessory muscle use. Heart:   Regular  rhythm,  No  murmur   No edema  Abdomen:   Soft, non-tender. Not distended. Bowel sounds normal  Extremities: No cyanosis. No clubbing,  Skin turgor normal, Capillary refill normal, Radial dial pulse 2+  Skin:     Not pale. Not Jaundiced  No rashes   Psych:  Good insight. Not depressed. Not anxious or agitated. Neurologic: EOMs intact. No facial asymmetry. No aphasia or slurred speech. Symmetrical strength, Sensation grossly intact.  Alert and oriented X 4.     _______________________________________________________________________  Care Plan discussed with:    Comments   Patient x    Family      RN     Care Manager                    Consultant:      _______________________________________________________________________  Expected  Disposition:   Home with Family x   HH/PT/OT/RN    SNF/LTC    JEREMY    ________________________________________________________________________  TOTAL TIME:  48   Minutes    Critical Care Provided     Minutes non procedure based      Comments    x Reviewed previous records   >50% of visit spent in counseling and coordination of care  Discussion with patient and/or family and questions answered       Given the patient's current clinical presentation, I have a high level of concern for decompensation if discharged from the ED. Complex decision making was performed which includes reviewing the patient's available past medical records, laboratory results, and Xray films. I have also directly communicated my plan and discussed this case with the involved ED physician.     ____________________________________________________________________  Zulma Hitchcock MD    Procedures: see electronic medical records for all procedures/Xrays and details which were not copied into this note but were reviewed prior to creation of Plan. LAB DATA REVIEWED:    Recent Results (from the past 24 hour(s))   CBC WITH AUTOMATED DIFF    Collection Time: 01/05/18  5:48 PM   Result Value Ref Range    WBC 5.9 3.6 - 11.0 K/uL    RBC 2.56 (L) 3.80 - 5.20 M/uL    HGB 8.3 (L) 11.5 - 16.0 g/dL    HCT 24.0 (L) 35.0 - 47.0 %    MCV 93.8 80.0 - 99.0 FL    MCH 32.4 26.0 - 34.0 PG    MCHC 34.6 30.0 - 36.5 g/dL    RDW 14.0 11.5 - 14.5 %    PLATELET 004 914 - 070 K/uL    NEUTROPHILS 38 32 - 75 %    LYMPHOCYTES 45 12 - 49 %    MONOCYTES 13 5 - 13 %    EOSINOPHILS 3 0 - 7 %    BASOPHILS 1 0 - 1 %    ABS. NEUTROPHILS 2.3 1.8 - 8.0 K/UL    ABS. LYMPHOCYTES 2.7 0.8 - 3.5 K/UL    ABS. MONOCYTES 0.8 0.0 - 1.0 K/UL    ABS. EOSINOPHILS 0.2 0.0 - 0.4 K/UL    ABS.  BASOPHILS 0.0 0.0 - 0.1 K/UL   RETICULOCYTE COUNT    Collection Time: 01/05/18  5:48 PM   Result Value Ref Range    Reticulocyte count 4.2 (H) 0.7 - 2.1 %   METABOLIC PANEL, COMPREHENSIVE    Collection Time: 01/05/18  5:48 PM   Result Value Ref Range    Sodium 140 136 - 145 mmol/L    Potassium 3.6 3.5 - 5.1 mmol/L    Chloride 107 97 - 108 mmol/L    CO2 27 21 - 32 mmol/L    Anion gap 6 5 - 15 mmol/L    Glucose 91 65 - 100 mg/dL    BUN 6 6 - 20 MG/DL    Creatinine 0.88 0.55 - 1.02 MG/DL    BUN/Creatinine ratio 7 (L) 12 - 20      GFR est AA >60 >60 ml/min/1.73m2    GFR est non-AA >60 >60 ml/min/1.73m2    Calcium 8.8 8.5 - 10.1 MG/DL    Bilirubin, total 1.9 (H) 0.2 - 1.0 MG/DL    ALT (SGPT) 16 12 - 78 U/L    AST (SGOT) 28 15 - 37 U/L    Alk.  phosphatase 100 45 - 117 U/L    Protein, total 9.1 (H) 6.4 - 8.2 g/dL    Albumin 4.0 3.5 - 5.0 g/dL    Globulin 5.1 (H) 2.0 - 4.0 g/dL    A-G Ratio 0.8 (L) 1.1 - 2.2

## 2018-01-06 NOTE — PROGRESS NOTES
Problem: Falls - Risk of  Goal: *Absence of Falls  Document Mukund Fall Risk and appropriate interventions in the flowsheet.    Outcome: Progressing Towards Goal  Fall Risk Interventions:            Medication Interventions: Evaluate medications/consider consulting pharmacy, Patient to call before getting OOB, Teach patient to arise slowly         History of Falls Interventions: Door open when patient unattended, Investigate reason for fall, Room close to nurse's station

## 2018-01-06 NOTE — PROGRESS NOTES
Oncology Nursing Communication Tool  7:20 PM  1/6/2018     Bedside and Verbal shift change report given to SAMIR Bates (incoming nurse) by Komal Nicole (outgoing nurse) on [facility]. Report included the following information SBAR, Kardex, MAR, Accordion, Recent Results and Cardiac Rhythm normal sinus. Shift Summary: Dilaudid changed from 2mg q4 hr to 1 mg q 3 hr per Dr. Jerry Perez. Patient placed on continuous pulse ox and 2 L NC  And blood gas performed per Dr. Jerry Perez and he was informed of results. Continuous fluids changed to NS with 20 KCL. Patient c/o continuing pain and now with headache 7/10. Patient was medicated as ordered. Oncoming RN aware and will monitor headache and consult physician if necessary for additional orders. Issues for physician to address: none       Oncology Shift Note   Admission Date 1/5/2018   Admission Diagnosis Sickle cell pain crisis ([facility] [address]   Code Status Full Code   Consults IP CONSULT TO HOSPITALIST      Cardiac Monitoring [x] Yes [] No      Purposeful Hourly Rounding [x] Yes    Mukund Score Total Score: 2   Mukund score 3 or > [] Bed Alarm [] Avasys [] 1:1 sitter [] Patient refused (Place signed refusal form in chart)      Pain Managed [x] Yes [] No    Key Pain Meds             HYDROcodone-acetaminophen (NORCO)  mg tablet  (Taking) Take 1 Tab by mouth every six (6) hours as needed for Pain. Max Daily Amount: 4 Tabs. Influenza Vaccine Received Flu Vaccine for Current Season (usually Sept-March): Yes           Oxygen needs?  [] Room air Oxygen @  []1L    [x]2L    []3L   []4L    []5L   []6L     Use home O2? [] Yes [] No  Perform O2 challenge test using  smartphrase (.oxygenchallenge)      Last bowel movement Last Bowel Movement Date: 01/03/18  bowel movement      Urinary Catheter             LDAs               Peripheral IV 01/05/18 Left Antecubital (Active)   Site Assessment Clean, dry, & intact 1/6/2018  3:00 PM   Phlebitis Assessment 0 1/6/2018  3:00 PM   Infiltration Assessment 0 1/6/2018  3:00 PM   Dressing Status Clean, dry, & intact 1/6/2018  3:00 PM   Dressing Type Tape;Transparent 1/6/2018  3:00 PM   Hub Color/Line Status Pink;Flushed 1/6/2018  3:00 PM   Action Taken Dressing reinforced;Blood drawn 1/5/2018  5:45 PM                         Readmission Risk Assessment Tool Score Medium Risk            14       Total Score        3 Has Seen PCP in Last 6 Months (Yes=3, No=0)    4 IP Visits Last 12 Months (1-3=4, 4=9, >4=11)    5 Pt. Coverage (Medicare=5 , Medicaid, or Self-Pay=4)    2 Charlson Comorbidity Score (Age + Comorbid Conditions)        Criteria that do not apply:    . Living with Significant Other. Assisted Living. LTAC. SNF.  or   Rehab    Patient Length of Stay (>5 days = 3)       Expected Length of Stay - - -   Actual Length of Stay [address] [address]

## 2018-01-06 NOTE — PROGRESS NOTES
Oncology Nursing Communication Tool  4:22 AM  1/6/2018     Bedside shift change report given to Alexei Weston RN (incoming nurse) by Augustin Garcia RN (outgoing nurse) on [facility]. Report included the following information SBAR, Kardex, Intake/Output, MAR and Recent Results. Shift Summary: Pt. Admitted from ED for SSC pain Mgmt. Req. Docs and dual skin completed. A&OX4, standby asst., call bell in reach. Pain meds to be administered w/ benedryl d/t allergy/ sensitivity. Labs drawn, IV hydration started. Cardiac monitoring. Issues for physician to address: Please see above. Last BM 1/3. Should we add miralax or stool softener to [facility]? Oncology Shift Note   Admission Date 1/5/2018   Admission Diagnosis Sickle cell pain crisis ([facility] [address]   Code Status Full Code   Consults IP CONSULT TO HOSPITALIST      Cardiac Monitoring [x] Yes [] No      Purposeful Hourly Rounding [] Yes    Mukund Score Total Score: 2   Mukund score 3 or > [] Bed Alarm [] Avasys [] 1:1 sitter [] Patient refused (Place signed refusal form in chart)      Pain Managed [x] Yes [] No    Key Pain Meds             HYDROcodone-acetaminophen (NORCO)  mg tablet  (Taking) Take 1 Tab by mouth every six (6) hours as needed for Pain. Max Daily Amount: 4 Tabs. Influenza Vaccine Received Flu Vaccine for Current Season (usually Sept-March): Yes           Oxygen needs?  [x] Room air Oxygen @  []1L    []2L    []3L   []4L    []5L   []6L     Use home O2? [] Yes [] No  Perform O2 challenge test using  smartphrase (.oxygenchallenge)      Last bowel movement Last Bowel Movement Date: 01/03/18  bowel movement      Urinary Catheter             LDAs               Peripheral IV 01/05/18 Left Antecubital (Active)   Site Assessment Clean, dry, & intact 1/6/2018  1:39 AM   Phlebitis Assessment 0 1/6/2018  1:39 AM   Infiltration Assessment 0 1/6/2018  1:39 AM   Dressing Status Clean, dry, & intact 1/6/2018  1:39 AM   Dressing Type Transparent;Tape 1/6/2018  1:39 AM   Hub Color/Line Status Pink;Flushed 1/6/2018  1:39 AM   Action Taken Dressing reinforced;Blood drawn 1/5/2018  5:45 PM                         Readmission Risk Assessment Tool Score Medium Risk            14       Total Score        3 Has Seen PCP in Last 6 Months (Yes=3, No=0)    4 IP Visits Last 12 Months (1-3=4, 4=9, >4=11)    5 Pt. Coverage (Medicare=5 , Medicaid, or Self-Pay=4)    2 Charlson Comorbidity Score (Age + Comorbid Conditions)        Criteria that do not apply:    . Living with Significant Other. Assisted Living. LTAC. SNF.  or   Rehab    Patient Length of Stay (>5 days = 3)       Expected Length of Stay - - -   Actual Length of Stay [address], RN

## 2018-01-06 NOTE — ED NOTES
TRANSFER - OUT REPORT:    Verbal report given to Latisha MCKEE RN(name) on Deborah Charter  being transferred to Oncology(unit) for routine progression of care       Report consisted of patients Situation, Background, Assessment and   Recommendations(SBAR). Information from the following report(s) SBAR, Kardex, ED Summary, [place] and Recent Results was reviewed with the receiving nurse. Lines:   Peripheral IV 01/05/18 Left Antecubital (Active)   Site Assessment Clean, dry, & intact 1/5/2018  5:45 PM   Phlebitis Assessment 0 1/5/2018  5:45 PM   Infiltration Assessment 0 1/5/2018  5:45 PM   Dressing Status Clean, dry, & intact 1/5/2018  5:45 PM   Dressing Type Transparent 1/5/2018  5:45 PM   Hub Color/Line Status Pink;Flushed 1/5/2018  5:45 PM   Action Taken Dressing reinforced;Blood drawn 1/5/2018  5:45 PM        Opportunity for questions and clarification was provided.       Patient transported with:   [employer]

## 2018-01-07 LAB
ALBUMIN SERPL-MCNC: 3.4 G/DL (ref 3.5–5)
ALBUMIN/GLOB SERPL: 0.8 {RATIO} (ref 1.1–2.2)
ALP SERPL-CCNC: 87 U/L (ref 45–117)
ALT SERPL-CCNC: 22 U/L (ref 12–78)
ANION GAP SERPL CALC-SCNC: 3 MMOL/L (ref 5–15)
AST SERPL-CCNC: 46 U/L (ref 15–37)
BACTERIA SPEC CULT: ABNORMAL
BASOPHILS # BLD: 0 K/UL (ref 0–0.1)
BASOPHILS NFR BLD: 0 % (ref 0–1)
BILIRUB SERPL-MCNC: 1.5 MG/DL (ref 0.2–1)
BUN SERPL-MCNC: 5 MG/DL (ref 6–20)
BUN/CREAT SERPL: 7 (ref 12–20)
CALCIUM SERPL-MCNC: 8.3 MG/DL (ref 8.5–10.1)
CHLORIDE SERPL-SCNC: 109 MMOL/L (ref 97–108)
CO2 SERPL-SCNC: 27 MMOL/L (ref 21–32)
CREAT SERPL-MCNC: 0.71 MG/DL (ref 0.55–1.02)
CRP SERPL-MCNC: 0.43 MG/DL (ref 0–0.6)
EOSINOPHIL # BLD: 0.3 K/UL (ref 0–0.4)
EOSINOPHIL NFR BLD: 3 % (ref 0–7)
ERYTHROCYTE [DISTWIDTH] IN BLOOD BY AUTOMATED COUNT: 15.7 % (ref 11.5–14.5)
EST. AVERAGE GLUCOSE BLD GHB EST-MCNC: ABNORMAL MG/DL
GLOBULIN SER CALC-MCNC: 4.5 G/DL (ref 2–4)
GLUCOSE SERPL-MCNC: 90 MG/DL (ref 65–100)
HBA1C MFR BLD: <3.5 % (ref 4.2–6.3)
HCT VFR BLD AUTO: 21 % (ref 35–47)
HGB BLD-MCNC: 7.4 G/DL (ref 11.5–16)
LYMPHOCYTES # BLD: 4.1 K/UL (ref 0.8–3.5)
LYMPHOCYTES NFR BLD: 40 % (ref 12–49)
MCH RBC QN AUTO: 33.8 PG (ref 26–34)
MCHC RBC AUTO-ENTMCNC: 35.2 G/DL (ref 30–36.5)
MCV RBC AUTO: 95.9 FL (ref 80–99)
MONOCYTES # BLD: 1.4 K/UL (ref 0–1)
MONOCYTES NFR BLD: 13 % (ref 5–13)
NEUTS SEG # BLD: 4.6 K/UL (ref 1.8–8)
NEUTS SEG NFR BLD: 44 % (ref 32–75)
PLATELET # BLD AUTO: 163 K/UL (ref 150–400)
POTASSIUM SERPL-SCNC: 4.6 MMOL/L (ref 3.5–5.1)
PROT SERPL-MCNC: 7.9 G/DL (ref 6.4–8.2)
RBC # BLD AUTO: 2.19 M/UL (ref 3.8–5.2)
RETICS/RBC NFR AUTO: 5.6 % (ref 0.7–2.1)
SERVICE CMNT-IMP: ABNORMAL
SODIUM SERPL-SCNC: 139 MMOL/L (ref 136–145)
WBC # BLD AUTO: 10.5 K/UL (ref 3.6–11)

## 2018-01-07 PROCEDURE — 83036 HEMOGLOBIN GLYCOSYLATED A1C: CPT | Performed by: INTERNAL MEDICINE

## 2018-01-07 PROCEDURE — 74011250637 HC RX REV CODE- 250/637: Performed by: INTERNAL MEDICINE

## 2018-01-07 PROCEDURE — 74011250636 HC RX REV CODE- 250/636: Performed by: INTERNAL MEDICINE

## 2018-01-07 PROCEDURE — 36415 COLL VENOUS BLD VENIPUNCTURE: CPT | Performed by: INTERNAL MEDICINE

## 2018-01-07 PROCEDURE — 65660000000 HC RM CCU STEPDOWN

## 2018-01-07 PROCEDURE — 85045 AUTOMATED RETICULOCYTE COUNT: CPT | Performed by: INTERNAL MEDICINE

## 2018-01-07 PROCEDURE — 85025 COMPLETE CBC W/AUTO DIFF WBC: CPT | Performed by: INTERNAL MEDICINE

## 2018-01-07 PROCEDURE — 86140 C-REACTIVE PROTEIN: CPT | Performed by: INTERNAL MEDICINE

## 2018-01-07 PROCEDURE — 80053 COMPREHEN METABOLIC PANEL: CPT | Performed by: INTERNAL MEDICINE

## 2018-01-07 RX ORDER — MUPIROCIN 20 MG/G
OINTMENT TOPICAL EVERY 12 HOURS
Status: DISPENSED | OUTPATIENT
Start: 2018-01-07 | End: 2018-01-12

## 2018-01-07 RX ADMIN — CITALOPRAM HYDROBROMIDE 10 MG: 20 TABLET ORAL at 21:02

## 2018-01-07 RX ADMIN — PANTOPRAZOLE SODIUM 40 MG: 40 TABLET, DELAYED RELEASE ORAL at 07:30

## 2018-01-07 RX ADMIN — HEPARIN SODIUM 5000 UNITS: 5000 INJECTION, SOLUTION INTRAVENOUS; SUBCUTANEOUS at 23:54

## 2018-01-07 RX ADMIN — ONDANSETRON HYDROCHLORIDE 4 MG: 2 INJECTION, SOLUTION INTRAMUSCULAR; INTRAVENOUS at 09:28

## 2018-01-07 RX ADMIN — ONDANSETRON HYDROCHLORIDE 4 MG: 2 INJECTION, SOLUTION INTRAMUSCULAR; INTRAVENOUS at 20:04

## 2018-01-07 RX ADMIN — SODIUM CHLORIDE AND POTASSIUM CHLORIDE: 9; 1.49 INJECTION, SOLUTION INTRAVENOUS at 22:39

## 2018-01-07 RX ADMIN — DIPHENHYDRAMINE HYDROCHLORIDE 25 MG: 50 INJECTION, SOLUTION INTRAMUSCULAR; INTRAVENOUS at 04:38

## 2018-01-07 RX ADMIN — ONDANSETRON HYDROCHLORIDE 4 MG: 2 INJECTION, SOLUTION INTRAMUSCULAR; INTRAVENOUS at 14:38

## 2018-01-07 RX ADMIN — DIPHENHYDRAMINE HYDROCHLORIDE 25 MG: 50 INJECTION, SOLUTION INTRAMUSCULAR; INTRAVENOUS at 09:28

## 2018-01-07 RX ADMIN — HYDROMORPHONE HYDROCHLORIDE 1 MG: 2 INJECTION INTRAMUSCULAR; INTRAVENOUS; SUBCUTANEOUS at 08:40

## 2018-01-07 RX ADMIN — HEPARIN SODIUM 5000 UNITS: 5000 INJECTION, SOLUTION INTRAVENOUS; SUBCUTANEOUS at 12:13

## 2018-01-07 RX ADMIN — ONDANSETRON HYDROCHLORIDE 4 MG: 2 INJECTION, SOLUTION INTRAMUSCULAR; INTRAVENOUS at 00:53

## 2018-01-07 RX ADMIN — HYDROMORPHONE HYDROCHLORIDE 1 MG: 2 INJECTION INTRAMUSCULAR; INTRAVENOUS; SUBCUTANEOUS at 01:03

## 2018-01-07 RX ADMIN — HYDROMORPHONE HYDROCHLORIDE 1 MG: 2 INJECTION INTRAMUSCULAR; INTRAVENOUS; SUBCUTANEOUS at 12:59

## 2018-01-07 RX ADMIN — FOLIC ACID 1 MG: 1 TABLET ORAL at 08:46

## 2018-01-07 RX ADMIN — HYDROMORPHONE HYDROCHLORIDE 1 MG: 2 INJECTION INTRAMUSCULAR; INTRAVENOUS; SUBCUTANEOUS at 04:38

## 2018-01-07 RX ADMIN — VALSARTAN 40 MG: 40 TABLET ORAL at 08:45

## 2018-01-07 RX ADMIN — DIPHENHYDRAMINE HYDROCHLORIDE 25 MG: 50 INJECTION, SOLUTION INTRAMUSCULAR; INTRAVENOUS at 14:38

## 2018-01-07 RX ADMIN — SODIUM CHLORIDE AND POTASSIUM CHLORIDE: 9; 1.49 INJECTION, SOLUTION INTRAVENOUS at 12:18

## 2018-01-07 RX ADMIN — DIPHENHYDRAMINE HYDROCHLORIDE 25 MG: 50 INJECTION, SOLUTION INTRAMUSCULAR; INTRAVENOUS at 20:04

## 2018-01-07 RX ADMIN — HYDROMORPHONE HYDROCHLORIDE 1 MG: 2 INJECTION INTRAMUSCULAR; INTRAVENOUS; SUBCUTANEOUS at 20:55

## 2018-01-07 RX ADMIN — DIPHENHYDRAMINE HYDROCHLORIDE 25 MG: 50 INJECTION, SOLUTION INTRAMUSCULAR; INTRAVENOUS at 00:58

## 2018-01-07 RX ADMIN — HYDROMORPHONE HYDROCHLORIDE 1 MG: 2 INJECTION INTRAMUSCULAR; INTRAVENOUS; SUBCUTANEOUS at 17:49

## 2018-01-07 RX ADMIN — MUPIROCIN: 20 OINTMENT TOPICAL at 21:02

## 2018-01-07 RX ADMIN — MUPIROCIN: 20 OINTMENT TOPICAL at 17:55

## 2018-01-07 RX ADMIN — ONDANSETRON HYDROCHLORIDE 4 MG: 2 INJECTION, SOLUTION INTRAMUSCULAR; INTRAVENOUS at 04:36

## 2018-01-07 NOTE — PROGRESS NOTES
Hospital Progress Note    NAME:  Igor Do   :   1960   MRN:  [id number]     Date/Time:  2018 6:29 AM    Plan:   1. Pain control  2. Observe o2 sats   3.  Benadryl for puritis  Risk of Deterioration: Low  []           Moderate  [x]           High  []                 Assessment:   Principal Problem:    Sickle cell pain crisis ([address] (2018)    Active Problems:    Venous insufficiency (10/3/2014)            Subjective:     Pain in chest and back  11 Point Review of Systems:   Negative except no sob    []            Unable to obtain ROS due to:       []            mental status change []            sedated []            intubated     Social History   Substance Use Topics    Smoking status: Never Smoker    Smokeless tobacco: Never Used    Alcohol use No     Medications reviewed:  Current Facility-Administered Medications   Medication Dose Route Frequency    citalopram (CELEXA) tablet 10 mg  10 mg Oral QHS    folic acid (FOLVITE) tablet 1 mg  1 mg Oral DAILY    pantoprazole (PROTONIX) tablet 40 mg  40 mg Oral ACB    valsartan (DIOVAN) tablet 40 mg  40 mg Oral DAILY    HYDROmorphone (DILAUDID) injection 1 mg  1 mg IntraVENous Q3H PRN    0.9% sodium chloride with KCl 20 mEq/L infusion   IntraVENous CONTINUOUS    acetaminophen (TYLENOL) tablet 650 mg  650 mg Oral Q4H PRN    diphenhydrAMINE (BENADRYL) injection 25 mg  25 mg IntraVENous Q4H PRN    ondansetron (ZOFRAN) injection 4 mg  4 mg IntraVENous Q4H PRN    heparin (porcine) injection 5,000 Units  5,000 Units SubCUTAneous Q12H        Objective:   Vitals:  Visit Vitals    /70 (BP 1 Location: Right arm, BP Patient Position: At rest)    Pulse 98    Temp 98.6 °F (37 °C)    Resp 16    Ht 5' 10\" (1.778 m)    Wt 190 lb 7.6 oz (86.4 kg)    SpO2 99%    BMI 27.33 kg/m2     Temp (24hrs), Av °F (37.2 °C), Min:98.6 °F (37 °C), Max:99.4 °F (37.4 °C)    O2 Flow Rate (L/min): 2 l/min O2 Device: Room air    Last 24hr Input/Output:    Intake/Output Summary (Last 24 hours) at 01/07/18 0631  Last data filed at 01/06/18 0644   Gross per 24 hour   Intake           635.42 ml   Output                0 ml   Net           635.42 ml        PHYSICAL EXAM:  General:    Alert, cooperative, no distress, appears stated age. Head:   Normocephalic, without obvious abnormality, atraumatic. Eyes:   Conjunctivae/corneas clear. PERRLA  Lungs:   Clear to auscultation bilaterally. No Wheezing or Rhonchi. No rales. Chest wall:  No tenderness or deformity. No Accessory muscle use. Heart:   Regular rate and rhythm,  no murmur, rub or gallop. Abdomen:   Soft, non-tender. Not distended.   Bowel sounds normal. No masses         Lab Data Reviewed:    Recent Labs      01/07/18 0415 01/06/18 0229 01/05/18 1748   WBC  10.5  11.9*  5.9   HGB  7.4*  8.3*  8.3*   HCT  21.0*  23.8*  24.0*   PLT  163  156  211     Recent Labs      01/07/18 0415 01/06/18 0229 01/05/18   1748   NA  139  140  140   K  4.6  3.2*  3.6   CL  109*  107  107   CO2  27  28  27   GLU  90  87  91   BUN  5*  7  6   CREA  0.71  1.12*  0.88   CA  8.3*  8.6  8.8   ALB  3.4*  3.9  4.0   TBILI  1.5*  2.2*  1.9*   SGOT  46*  29  28   ALT  22  17  16     No results found for: GLUCPOC  Recent Labs      01/06/18   1415   PH  7.32*   PCO2  47*   PO2  96   HCO3  24     ___________________________________________________  ___________________________________________________    Attending Physician: Alisha Whitney MD

## 2018-01-07 NOTE — PROGRESS NOTES
Received a call from [facility] regarding a nasal MRSA swab for patient. Patient is positive for MRSA in the nares. Dr. Lianet Nieves paged.  Awaiting call back    [id number]: Dr. Lianet Nieves ordered Kiana LOPEZ

## 2018-01-07 NOTE — PROGRESS NOTES
Hospital Progress Note    NAME:  Merline River   :   1960   MRN:  [id number]     Date/Time:  2018 6:29 AM    Plan:   1. Pain control  2. Observe o2 sats   3.  Benadryl for puritis  Risk of Deterioration: Low  []           Moderate  [x]           High  []                 Assessment:   Principal Problem:    Sickle cell pain crisis ([address] (2018)    Active Problems:    Venous insufficiency (10/3/2014)            Subjective:     Pain in chest and back  11 Point Review of Systems:   Negative except no sob    []            Unable to obtain ROS due to:       []            mental status change []            sedated []            intubated     Social History   Substance Use Topics    Smoking status: Never Smoker    Smokeless tobacco: Never Used    Alcohol use No     Medications reviewed:  Current Facility-Administered Medications   Medication Dose Route Frequency    citalopram (CELEXA) tablet 10 mg  10 mg Oral QHS    folic acid (FOLVITE) tablet 1 mg  1 mg Oral DAILY    pantoprazole (PROTONIX) tablet 40 mg  40 mg Oral ACB    valsartan (DIOVAN) tablet 40 mg  40 mg Oral DAILY    HYDROmorphone (DILAUDID) injection 1 mg  1 mg IntraVENous Q3H PRN    0.9% sodium chloride with KCl 20 mEq/L infusion   IntraVENous CONTINUOUS    acetaminophen (TYLENOL) tablet 650 mg  650 mg Oral Q4H PRN    diphenhydrAMINE (BENADRYL) injection 25 mg  25 mg IntraVENous Q4H PRN    ondansetron (ZOFRAN) injection 4 mg  4 mg IntraVENous Q4H PRN    heparin (porcine) injection 5,000 Units  5,000 Units SubCUTAneous Q12H        Objective:   Vitals:  Visit Vitals    /70 (BP 1 Location: Right arm, BP Patient Position: At rest)    Pulse 98    Temp 98.6 °F (37 °C)    Resp 16    Ht 5' 10\" (1.778 m)    Wt 190 lb 7.6 oz (86.4 kg)    SpO2 99%    BMI 27.33 kg/m2     Temp (24hrs), Av °F (37.2 °C), Min:98.6 °F (37 °C), Max:99.4 °F (37.4 °C)    O2 Flow Rate (L/min): 2 l/min O2 Device: Room air    Last 24hr Input/Output:    Intake/Output Summary (Last 24 hours) at 01/07/18 0629  Last data filed at 01/06/18 0644   Gross per 24 hour   Intake           635.42 ml   Output                0 ml   Net           635.42 ml        PHYSICAL EXAM:  General:    Alert, cooperative, no distress, appears stated age. Head:   Normocephalic, without obvious abnormality, atraumatic. Eyes:   Conjunctivae/corneas clear. PERRLA  Nose:  Nares normal. No drainage or sinus tenderness. Throat:    Lips, mucosa, and tongue normal.  No Thrush  Neck:  Supple, symmetrical,  no adenopathy, thyroid: non tender    no carotid bruit and no JVD. Back:    Symmetric,  No CVA tenderness. Lungs:   Clear to auscultation bilaterally. No Wheezing or Rhonchi. No rales. Chest wall:  No tenderness or deformity. No Accessory muscle use. Heart:   Regular rate and rhythm,  no murmur, rub or gallop. Abdomen:   Soft, non-tender. Not distended. Bowel sounds normal. No masses  Extremities: Extremities normal, atraumatic, No cyanosis. No edema. No clubbing  Skin:     Texture, turgor normal. No rashes or lesions. Not Jaundiced  Lymph nodes: Cervical, supraclavicular normal.  Psych:  Good insight. Not depressed. Not anxious or agitated. Neurologic: Normal strength, Alert and oriented X 3.        Lab Data Reviewed:    Recent Labs      01/07/18   0415 01/06/18 0229 01/05/18   1748   WBC  10.5  11.9*  5.9   HGB  7.4*  8.3*  8.3*   HCT  21.0*  23.8*  24.0*   PLT  163  156  211     Recent Labs      01/07/18   0415  01/06/18 0229 01/05/18   1748   NA  139  140  140   K  4.6  3.2*  3.6   CL  109*  107  107   CO2  27  28  27   GLU  90  87  91   BUN  5*  7  6   CREA  0.71  1.12*  0.88   CA  8.3*  8.6  8.8   ALB  3.4*  3.9  4.0   TBILI  1.5*  2.2*  1.9*   SGOT  46*  29  28   ALT  22  17  16     No results found for: GLUCPOC  Recent Labs      01/06/18   1415   PH  7.32*   PCO2  47*   PO2  96   HCO3  24 ___________________________________________________  ___________________________________________________    Attending Physician: Yifan Crystal, MD

## 2018-01-08 PROCEDURE — 74011250636 HC RX REV CODE- 250/636: Performed by: INTERNAL MEDICINE

## 2018-01-08 PROCEDURE — 74011250637 HC RX REV CODE- 250/637: Performed by: INTERNAL MEDICINE

## 2018-01-08 PROCEDURE — 76937 US GUIDE VASCULAR ACCESS: CPT

## 2018-01-08 PROCEDURE — 77010033678 HC OXYGEN DAILY

## 2018-01-08 PROCEDURE — 65660000000 HC RM CCU STEPDOWN

## 2018-01-08 RX ORDER — SODIUM CHLORIDE 0.9 % (FLUSH) 0.9 %
SYRINGE (ML) INJECTION
Status: COMPLETED
Start: 2018-01-08 | End: 2018-01-08

## 2018-01-08 RX ADMIN — DIPHENHYDRAMINE HYDROCHLORIDE 25 MG: 50 INJECTION, SOLUTION INTRAMUSCULAR; INTRAVENOUS at 22:29

## 2018-01-08 RX ADMIN — DIPHENHYDRAMINE HYDROCHLORIDE 25 MG: 50 INJECTION, SOLUTION INTRAMUSCULAR; INTRAVENOUS at 08:22

## 2018-01-08 RX ADMIN — HYDROMORPHONE HYDROCHLORIDE 1 MG: 2 INJECTION INTRAMUSCULAR; INTRAVENOUS; SUBCUTANEOUS at 18:31

## 2018-01-08 RX ADMIN — ONDANSETRON HYDROCHLORIDE 4 MG: 2 INJECTION, SOLUTION INTRAMUSCULAR; INTRAVENOUS at 03:51

## 2018-01-08 RX ADMIN — CITALOPRAM HYDROBROMIDE 10 MG: 20 TABLET ORAL at 22:29

## 2018-01-08 RX ADMIN — DIPHENHYDRAMINE HYDROCHLORIDE 25 MG: 50 INJECTION, SOLUTION INTRAMUSCULAR; INTRAVENOUS at 12:59

## 2018-01-08 RX ADMIN — MUPIROCIN: 20 OINTMENT TOPICAL at 08:23

## 2018-01-08 RX ADMIN — ONDANSETRON HYDROCHLORIDE 4 MG: 2 INJECTION, SOLUTION INTRAMUSCULAR; INTRAVENOUS at 00:03

## 2018-01-08 RX ADMIN — PANTOPRAZOLE SODIUM 40 MG: 40 TABLET, DELAYED RELEASE ORAL at 07:12

## 2018-01-08 RX ADMIN — ONDANSETRON HYDROCHLORIDE 4 MG: 2 INJECTION, SOLUTION INTRAMUSCULAR; INTRAVENOUS at 12:59

## 2018-01-08 RX ADMIN — SODIUM CHLORIDE: 900 INJECTION, SOLUTION INTRAVENOUS at 11:11

## 2018-01-08 RX ADMIN — HYDROMORPHONE HYDROCHLORIDE 1 MG: 2 INJECTION INTRAMUSCULAR; INTRAVENOUS; SUBCUTANEOUS at 07:13

## 2018-01-08 RX ADMIN — SODIUM CHLORIDE AND POTASSIUM CHLORIDE: 9; 1.49 INJECTION, SOLUTION INTRAVENOUS at 08:27

## 2018-01-08 RX ADMIN — DIPHENHYDRAMINE HYDROCHLORIDE 25 MG: 50 INJECTION, SOLUTION INTRAMUSCULAR; INTRAVENOUS at 18:31

## 2018-01-08 RX ADMIN — HYDROMORPHONE HYDROCHLORIDE 1 MG: 2 INJECTION INTRAMUSCULAR; INTRAVENOUS; SUBCUTANEOUS at 22:29

## 2018-01-08 RX ADMIN — ONDANSETRON HYDROCHLORIDE 4 MG: 2 INJECTION, SOLUTION INTRAMUSCULAR; INTRAVENOUS at 08:22

## 2018-01-08 RX ADMIN — FOLIC ACID 1 MG: 1 TABLET ORAL at 08:21

## 2018-01-08 RX ADMIN — DIPHENHYDRAMINE HYDROCHLORIDE 25 MG: 50 INJECTION, SOLUTION INTRAMUSCULAR; INTRAVENOUS at 00:02

## 2018-01-08 RX ADMIN — HYDROMORPHONE HYDROCHLORIDE 1 MG: 2 INJECTION INTRAMUSCULAR; INTRAVENOUS; SUBCUTANEOUS at 11:08

## 2018-01-08 RX ADMIN — VALSARTAN 40 MG: 40 TABLET ORAL at 08:21

## 2018-01-08 RX ADMIN — HYDROMORPHONE HYDROCHLORIDE 1 MG: 2 INJECTION INTRAMUSCULAR; INTRAVENOUS; SUBCUTANEOUS at 14:18

## 2018-01-08 RX ADMIN — ONDANSETRON HYDROCHLORIDE 4 MG: 2 INJECTION, SOLUTION INTRAMUSCULAR; INTRAVENOUS at 18:31

## 2018-01-08 RX ADMIN — HYDROMORPHONE HYDROCHLORIDE 1 MG: 2 INJECTION INTRAMUSCULAR; INTRAVENOUS; SUBCUTANEOUS at 00:03

## 2018-01-08 RX ADMIN — ONDANSETRON HYDROCHLORIDE 4 MG: 2 INJECTION, SOLUTION INTRAMUSCULAR; INTRAVENOUS at 22:29

## 2018-01-08 RX ADMIN — MUPIROCIN: 20 OINTMENT TOPICAL at 22:36

## 2018-01-08 RX ADMIN — Medication 10 ML: at 20:08

## 2018-01-08 RX ADMIN — HEPARIN SODIUM 5000 UNITS: 5000 INJECTION, SOLUTION INTRAVENOUS; SUBCUTANEOUS at 11:10

## 2018-01-08 NOTE — PROGRESS NOTES
General Daily Progress Note    Admit Date: 1/5/2018    Subjective:     Patient complains about continued pain. Current Facility-Administered Medications   Medication Dose Route Frequency    0.9% sodium chloride 1,000 mL with potassium chloride 10 mEq infusion   IntraVENous CONTINUOUS    mupirocin (BACTROBAN) 2 % ointment   Both Nostrils Q12H    citalopram (CELEXA) tablet 10 mg  10 mg Oral QHS    folic acid (FOLVITE) tablet 1 mg  1 mg Oral DAILY    pantoprazole (PROTONIX) tablet 40 mg  40 mg Oral ACB    valsartan (DIOVAN) tablet 40 mg  40 mg Oral DAILY    HYDROmorphone (DILAUDID) injection 1 mg  1 mg IntraVENous Q3H PRN    acetaminophen (TYLENOL) tablet 650 mg  650 mg Oral Q4H PRN    diphenhydrAMINE (BENADRYL) injection 25 mg  25 mg IntraVENous Q4H PRN    ondansetron (ZOFRAN) injection 4 mg  4 mg IntraVENous Q4H PRN    heparin (porcine) injection 5,000 Units  5,000 Units SubCUTAneous Q12H        Review of Systems  A comprehensive review of systems was negative.     Objective:     Patient Vitals for the past 24 hrs:   BP Temp Pulse Resp SpO2   01/08/18 0822 139/76 99.1 °F (37.3 °C) 89 12 99 %   01/08/18 0821 139/76 - 90 - -   01/08/18 0349 133/77 99.2 °F (37.3 °C) 94 18 100 %   01/07/18 2359 138/80 99.3 °F (37.4 °C) 96 16 98 %   01/07/18 2038 142/84 99 °F (37.2 °C) 96 16 100 %   01/07/18 1755 141/86 99.1 °F (37.3 °C) 96 16 100 %   01/07/18 1103 139/87 99.3 °F (37.4 °C) 84 16 100 %        01/06 1901 - 01/08 0700  In: 4160 [I.V.:4160]  Out: -     Physical Exam:   Visit Vitals    /76 (BP 1 Location: Right arm, BP Patient Position: At rest;Supine)    Pulse 89    Temp 99.1 °F (37.3 °C)    Resp 12    Ht 5' 10\" (1.778 m)    Wt 190 lb 7.6 oz (86.4 kg)    SpO2 99%    BMI 27.33 kg/m2     General appearance: alert, cooperative, no distress, appears stated age  Neck: supple, symmetrical, trachea midline, no adenopathy, thyroid: not enlarged, symmetric, no tenderness/mass/nodules, no carotid bruit and no JVD  Lungs: clear to auscultation bilaterally  Heart: regular rate and rhythm, S1, S2 normal, no murmur, click, rub or gallop  Abdomen: soft, non-tender. Bowel sounds normal. No masses,  no organomegaly  Extremities: extremities normal, atraumatic, no cyanosis or edema        Data Review No results found for this or any previous visit (from the past 24 hour(s)). Assessment:     Principal Problem:    Sickle cell pain crisis ([facility] [address] (1/5/2018)    Active Problems:    Venous insufficiency (10/3/2014)        Plan:     1. Continues with painful crises. Continue treatment regimen with analgesics and oxygen as well as parenteral fluids.

## 2018-01-08 NOTE — INTERDISCIPLINARY ROUNDS
Oncology Interdisciplinary rounds were held today to discuss patient plan of care and outcomes. The following members were present: Nursing and Case Management.     ALOS: 3       Plan of Care Discharge Disposition   Pain control  O2 monitoring  Home with family

## 2018-01-08 NOTE — PROGRESS NOTES
Oncology Nursing Communication Tool  8:27 PM  1/7/2018     Bedside shift change report given to Dewey Cox RN (incoming nurse) by Amanda Osman RN (outgoing nurse) on Alli Darnell. Report included the following information SBAR and Kardex. Shift Summary: pain management      Issues for physician to address: none         Oncology Shift Note   Admission Date 1/5/2018   Admission Diagnosis Sickle cell pain crisis ([address]   Code Status Full Code   Consults IP CONSULT TO HOSPITALIST      Cardiac Monitoring [] Yes [] No      Purposeful Hourly Rounding [] Yes    Mukund Score Total Score: 2   Mukund score 3 or > [] Bed Alarm [] Avasys [] 1:1 sitter [] Patient refused (Place signed refusal form in chart)      Pain Managed [] Yes [] No    Key Pain Meds             HYDROcodone-acetaminophen (NORCO)  mg tablet  (Taking) Take 1 Tab by mouth every six (6) hours as needed for Pain. Max Daily Amount: 4 Tabs. Influenza Vaccine Received Flu Vaccine for Current Season (usually Sept-March): Yes           Oxygen needs?  [] Room air Oxygen @  []1L    []2L    []3L   []4L    []5L   []6L     Use home O2? [] Yes [] No  Perform O2 challenge test using  smartphrase (.oxygenchallenge)      Last bowel movement Last Bowel Movement Date: 01/03/18  bowel movement      Urinary Catheter             LDAs               Peripheral IV 01/05/18 Left Antecubital (Active)   Site Assessment Clean, dry, & intact 1/7/2018  3:00 PM   Phlebitis Assessment 0 1/7/2018  3:00 PM   Infiltration Assessment 0 1/7/2018  3:00 PM   Dressing Status Clean, dry, & intact 1/7/2018  3:00 PM   Dressing Type Transparent 1/7/2018  3:00 PM   Hub Color/Line Status Pink;Flushed 1/7/2018  3:00 PM   Action Taken Dressing reinforced;Blood drawn 1/5/2018  5:45 PM                         Readmission Risk Assessment Tool Score Medium Risk            14       Total Score        3 Has Seen PCP in Last 6 Months (Yes=3, No=0)    4 IP Visits Last 12 Months (1-3=4, 4=9, >4=11)    5 Pt. Coverage (Medicare=5 , Medicaid, or Self-Pay=4)    2 Charlson Comorbidity Score (Age + Comorbid Conditions)        Criteria that do not apply:    . Living with Significant Other. Assisted Living. LTAC. SNF.  or   Rehab    Patient Length of Stay (>5 days = 3)       Expected Length of Stay - - -   Actual Length of Stay 2          Dannie Stover RN

## 2018-01-09 LAB
ALBUMIN SERPL-MCNC: 3.3 G/DL (ref 3.5–5)
ALBUMIN/GLOB SERPL: 0.8 {RATIO} (ref 1.1–2.2)
ALP SERPL-CCNC: 86 U/L (ref 45–117)
ALT SERPL-CCNC: 17 U/L (ref 12–78)
ANION GAP SERPL CALC-SCNC: 5 MMOL/L (ref 5–15)
AST SERPL-CCNC: 31 U/L (ref 15–37)
BASOPHILS # BLD: 0 K/UL
BASOPHILS NFR BLD: 0 %
BILIRUB SERPL-MCNC: 1.7 MG/DL (ref 0.2–1)
BLASTS NFR BLD MANUAL: 0 %
BUN SERPL-MCNC: 4 MG/DL (ref 6–20)
BUN/CREAT SERPL: 5 (ref 12–20)
CALCIUM SERPL-MCNC: 8.2 MG/DL (ref 8.5–10.1)
CHLORIDE SERPL-SCNC: 106 MMOL/L (ref 97–108)
CO2 SERPL-SCNC: 30 MMOL/L (ref 21–32)
CREAT SERPL-MCNC: 0.75 MG/DL (ref 0.55–1.02)
DIFFERENTIAL METHOD BLD: ABNORMAL
EOSINOPHIL # BLD: 0.1 K/UL
EOSINOPHIL NFR BLD: 1 %
ERYTHROCYTE [DISTWIDTH] IN BLOOD BY AUTOMATED COUNT: 16.5 % (ref 11.5–14.5)
GLOBULIN SER CALC-MCNC: 4.4 G/DL (ref 2–4)
GLUCOSE SERPL-MCNC: 117 MG/DL (ref 65–100)
HCT VFR BLD AUTO: 21.2 % (ref 35–47)
HGB BLD-MCNC: 7.2 G/DL (ref 11.5–16)
LYMPHOCYTES # BLD: 7.9 K/UL
LYMPHOCYTES NFR BLD: 71 %
MANUAL DIFFERENTIAL PERFORMED BLD QL: ABNORMAL
MCH RBC QN AUTO: 33 PG (ref 26–34)
MCHC RBC AUTO-ENTMCNC: 34 G/DL (ref 30–36.5)
MCV RBC AUTO: 97.2 FL (ref 80–99)
METAMYELOCYTES NFR BLD MANUAL: 0 %
MONOCYTES # BLD: 0.8 K/UL
MONOCYTES NFR BLD: 7 %
MYELOCYTES NFR BLD MANUAL: 0 %
NEUTS BAND NFR BLD MANUAL: 0 %
NEUTS SEG # BLD: 2.3 K/UL
NEUTS SEG NFR BLD: 21 %
NRBC # BLD: 0.2 K/UL (ref 0–0.01)
NRBC BLD-RTO: 1.8 PER 100 WBC
OTHER CELLS NFR BLD MANUAL: 0 %
PLATELET # BLD AUTO: 108 K/UL (ref 150–400)
PLATELET COMMENTS,PCOM: ABNORMAL
POTASSIUM SERPL-SCNC: 3.8 MMOL/L (ref 3.5–5.1)
PROMYELOCYTES NFR BLD MANUAL: 0 %
PROT SERPL-MCNC: 7.7 G/DL (ref 6.4–8.2)
RBC # BLD AUTO: 2.18 M/UL (ref 3.8–5.2)
RBC MORPH BLD: ABNORMAL
SODIUM SERPL-SCNC: 141 MMOL/L (ref 136–145)
WBC # BLD AUTO: 11.1 K/UL (ref 3.6–11)
WBC NRBC COR # BLD: ABNORMAL 10*3/UL

## 2018-01-09 PROCEDURE — 74011250636 HC RX REV CODE- 250/636: Performed by: INTERNAL MEDICINE

## 2018-01-09 PROCEDURE — 74011250637 HC RX REV CODE- 250/637: Performed by: INTERNAL MEDICINE

## 2018-01-09 PROCEDURE — 77010033678 HC OXYGEN DAILY

## 2018-01-09 PROCEDURE — 80053 COMPREHEN METABOLIC PANEL: CPT | Performed by: INTERNAL MEDICINE

## 2018-01-09 PROCEDURE — 65660000000 HC RM CCU STEPDOWN

## 2018-01-09 PROCEDURE — 36415 COLL VENOUS BLD VENIPUNCTURE: CPT | Performed by: INTERNAL MEDICINE

## 2018-01-09 PROCEDURE — 85027 COMPLETE CBC AUTOMATED: CPT | Performed by: INTERNAL MEDICINE

## 2018-01-09 RX ORDER — DIPHENHYDRAMINE HYDROCHLORIDE 50 MG/ML
25 INJECTION, SOLUTION INTRAMUSCULAR; INTRAVENOUS
Status: DISCONTINUED | OUTPATIENT
Start: 2018-01-09 | End: 2018-01-16 | Stop reason: HOSPADM

## 2018-01-09 RX ORDER — ADHESIVE BANDAGE
30 BANDAGE TOPICAL DAILY PRN
Status: DISCONTINUED | OUTPATIENT
Start: 2018-01-09 | End: 2018-01-16 | Stop reason: HOSPADM

## 2018-01-09 RX ORDER — POLYETHYLENE GLYCOL 3350 17 G/17G
17 POWDER, FOR SOLUTION ORAL DAILY
Status: DISCONTINUED | OUTPATIENT
Start: 2018-01-09 | End: 2018-01-16 | Stop reason: HOSPADM

## 2018-01-09 RX ADMIN — HEPARIN SODIUM 5000 UNITS: 5000 INJECTION, SOLUTION INTRAVENOUS; SUBCUTANEOUS at 11:23

## 2018-01-09 RX ADMIN — CITALOPRAM HYDROBROMIDE 10 MG: 20 TABLET ORAL at 21:49

## 2018-01-09 RX ADMIN — DIPHENHYDRAMINE HYDROCHLORIDE 25 MG: 50 INJECTION, SOLUTION INTRAMUSCULAR; INTRAVENOUS at 05:04

## 2018-01-09 RX ADMIN — SODIUM CHLORIDE: 900 INJECTION, SOLUTION INTRAVENOUS at 17:09

## 2018-01-09 RX ADMIN — DIPHENHYDRAMINE HYDROCHLORIDE 25 MG: 50 INJECTION, SOLUTION INTRAMUSCULAR; INTRAVENOUS at 15:36

## 2018-01-09 RX ADMIN — HYDROMORPHONE HYDROCHLORIDE 1 MG: 2 INJECTION INTRAMUSCULAR; INTRAVENOUS; SUBCUTANEOUS at 15:37

## 2018-01-09 RX ADMIN — MUPIROCIN: 20 OINTMENT TOPICAL at 11:22

## 2018-01-09 RX ADMIN — HYDROMORPHONE HYDROCHLORIDE 1 MG: 2 INJECTION INTRAMUSCULAR; INTRAVENOUS; SUBCUTANEOUS at 22:46

## 2018-01-09 RX ADMIN — SODIUM CHLORIDE: 900 INJECTION, SOLUTION INTRAVENOUS at 01:38

## 2018-01-09 RX ADMIN — DIPHENHYDRAMINE HYDROCHLORIDE 25 MG: 50 INJECTION, SOLUTION INTRAMUSCULAR; INTRAVENOUS at 02:34

## 2018-01-09 RX ADMIN — DIPHENHYDRAMINE HYDROCHLORIDE 25 MG: 50 INJECTION, SOLUTION INTRAMUSCULAR; INTRAVENOUS at 20:46

## 2018-01-09 RX ADMIN — HYDROMORPHONE HYDROCHLORIDE 1 MG: 2 INJECTION INTRAMUSCULAR; INTRAVENOUS; SUBCUTANEOUS at 12:30

## 2018-01-09 RX ADMIN — HYDROMORPHONE HYDROCHLORIDE 1 MG: 2 INJECTION INTRAMUSCULAR; INTRAVENOUS; SUBCUTANEOUS at 05:03

## 2018-01-09 RX ADMIN — HYDROMORPHONE HYDROCHLORIDE 1 MG: 2 INJECTION INTRAMUSCULAR; INTRAVENOUS; SUBCUTANEOUS at 19:33

## 2018-01-09 RX ADMIN — MAGNESIUM HYDROXIDE 30 ML: 400 SUSPENSION ORAL at 11:23

## 2018-01-09 RX ADMIN — PANTOPRAZOLE SODIUM 40 MG: 40 TABLET, DELAYED RELEASE ORAL at 08:43

## 2018-01-09 RX ADMIN — HYDROMORPHONE HYDROCHLORIDE 1 MG: 2 INJECTION INTRAMUSCULAR; INTRAVENOUS; SUBCUTANEOUS at 01:38

## 2018-01-09 RX ADMIN — VALSARTAN 40 MG: 40 TABLET ORAL at 08:42

## 2018-01-09 RX ADMIN — POLYETHYLENE GLYCOL 3350 17 G: 17 POWDER, FOR SOLUTION ORAL at 11:20

## 2018-01-09 RX ADMIN — ONDANSETRON HYDROCHLORIDE 4 MG: 2 INJECTION, SOLUTION INTRAMUSCULAR; INTRAVENOUS at 20:45

## 2018-01-09 RX ADMIN — HEPARIN SODIUM 5000 UNITS: 5000 INJECTION, SOLUTION INTRAVENOUS; SUBCUTANEOUS at 01:36

## 2018-01-09 RX ADMIN — DIPHENHYDRAMINE HYDROCHLORIDE 25 MG: 50 INJECTION, SOLUTION INTRAMUSCULAR; INTRAVENOUS at 12:30

## 2018-01-09 RX ADMIN — FOLIC ACID 1 MG: 1 TABLET ORAL at 08:43

## 2018-01-09 RX ADMIN — ONDANSETRON HYDROCHLORIDE 4 MG: 2 INJECTION, SOLUTION INTRAMUSCULAR; INTRAVENOUS at 05:03

## 2018-01-09 RX ADMIN — ONDANSETRON HYDROCHLORIDE 4 MG: 2 INJECTION, SOLUTION INTRAMUSCULAR; INTRAVENOUS at 02:34

## 2018-01-09 RX ADMIN — HYDROMORPHONE HYDROCHLORIDE 1 MG: 2 INJECTION INTRAMUSCULAR; INTRAVENOUS; SUBCUTANEOUS at 08:45

## 2018-01-09 NOTE — PROGRESS NOTES
RN contacted Dr. Giovanna Roman regarding patients constipation with no BM since 1/3/18 and difficulty with venous access. Orders to follow.

## 2018-01-09 NOTE — PROGRESS NOTES
Oncology Nursing Communication Tool  8:58 PM  1/8/2018     Bedside shift change report given to Manuel Lai RN (incoming nurse) by Mary Anne Arguello RN (outgoing nurse) on Sis Alvarez. Report included the following information SBAR and Kardex. Shift Summary: fluids changed to NS with 10 kcl. Iv resited      Issues for physician to address: discharge planning? Oncology Shift Note   Admission Date 1/5/2018   Admission Diagnosis Sickle cell pain crisis ([address]   Code Status Full Code   Consults IP CONSULT TO HOSPITALIST      Cardiac Monitoring [] Yes [] No      Purposeful Hourly Rounding [] Yes    Mukund Score Total Score: 2   Mukund score 3 or > [] Bed Alarm [] Avasys [] 1:1 sitter [] Patient refused (Place signed refusal form in chart)      Pain Managed [] Yes [] No    Key Pain Meds             HYDROcodone-acetaminophen (NORCO)  mg tablet  (Taking) Take 1 Tab by mouth every six (6) hours as needed for Pain. Max Daily Amount: 4 Tabs. Influenza Vaccine Received Flu Vaccine for Current Season (usually Sept-March): Yes           Oxygen needs? [] Room air Oxygen @  []1L    []2L    []3L   []4L    []5L   []6L     Use home O2? [] Yes [] No  Perform O2 challenge test using  smartphrase (.oxygenchallenge)      Last bowel movement Last Bowel Movement Date: 01/03/18  bowel movement      Urinary Catheter             LDAs               Peripheral IV 15/06/41 Left Cephalic (Active)   Site Assessment Clean, dry, & intact 1/8/2018  8:05 PM   Phlebitis Assessment 0 1/8/2018  8:05 PM   Infiltration Assessment 0 1/8/2018  8:05 PM   Dressing Status Clean, dry, & intact 1/8/2018  8:05 PM   Dressing Type Transparent;Tape 1/8/2018  8:05 PM   Hub Color/Line Status Pink; Infusing 1/8/2018  8:05 PM   Action Taken Other (comment) 1/8/2018  6:02 PM                         Readmission Risk Assessment Tool Score Medium Risk            14       Total Score        3 Has Seen PCP in Last 6 Months (Yes=3, No=0) 4 IP Visits Last 12 Months (1-3=4, 4=9, >4=11)    5 Pt. Coverage (Medicare=5 , Medicaid, or Self-Pay=4)    2 Charlson Comorbidity Score (Age + Comorbid Conditions)        Criteria that do not apply:    . Living with Significant Other. Assisted Living. LTAC. SNF.  or   Rehab    Patient Length of Stay (>5 days = 3)       Expected Length of Stay 2d 19h   Actual Length of Stay [address] Harsha Lynne, RN

## 2018-01-09 NOTE — PROGRESS NOTES
Oncology Interdisciplinary rounds were held today to discuss patient plan of care and outcomes. The following members were present: Nursing and Case Management.     ALOS: 4  DRG GLOS: 2.8    Plan for Day Mobility Plan for Stay Discharge Disposition   IV fluids  Pain Medications   Up ad erum       IV access   Monitor Labs  Continuous Pulse OX  Monitor oxygen levels Home

## 2018-01-09 NOTE — PROGRESS NOTES
Oncology Nursing Communication Tool  7:07 PM  1/9/2018     Bedside and Verbal shift change report given to Nicki Goddard RN (incoming nurse) by [place] [employer] (outgoing nurse) on Alba Carpio. Report included the following information SBAR, Kardex, Intake/Output, MAR, Accordion, Recent Results and Cardiac Rhythm NSR. Shift Summary: Patient rested comfortably throughout shift. Patient continues with IV pain medication and benadryl. Patient remained on [address]. Patient also continues with IV fluids. Patient was up ad erum to bathroom throughout shift. Issues for physician to address: none       Oncology Shift Note   Admission Date 1/5/2018   Admission Diagnosis Sickle cell pain crisis ([facility] [address]   Code Status Full Code   Consults IP CONSULT TO HOSPITALIST      Cardiac Monitoring [x] Yes [] No      Purposeful Hourly Rounding [x] Yes    Mukund Score Total Score: 2   Mukund score 3 or > [] Bed Alarm [] Avasys [] 1:1 sitter [] Patient refused (Place signed refusal form in chart)      Pain Managed [x] Yes [x] No    Key Pain Meds             HYDROcodone-acetaminophen (NORCO)  mg tablet  (Taking) Take 1 Tab by mouth every six (6) hours as needed for Pain. Max Daily Amount: 4 Tabs. Influenza Vaccine Received Flu Vaccine for Current Season (usually Sept-March): Yes           Oxygen needs?  [] Room air Oxygen @  [x]1L    []2L    []3L   []4L    []5L   []6L     Use home O2? [] Yes [] No  Perform O2 challenge test using  smartphrase (.oxygenchallenge)      Last bowel movement Last Bowel Movement Date: 01/03/18  bowel movement      Urinary Catheter             LDAs               Peripheral IV 59/54/83 Left Cephalic (Active)   Site Assessment Clean, dry, & intact 1/9/2018  3:20 PM   Phlebitis Assessment 0 1/9/2018  3:20 PM   Infiltration Assessment 0 1/9/2018  3:20 PM   Dressing Status Clean, dry, & intact 1/9/2018  3:20 PM   Dressing Type Tape;Transparent 1/9/2018  3:20 PM   Hub Color/Line Status Pink;Infusing 1/9/2018  3:20 PM   Action Taken Other (comment) 1/8/2018  6:02 PM                         Readmission Risk Assessment Tool Score Medium Risk            14       Total Score        3 Has Seen PCP in Last 6 Months (Yes=3, No=0)    4 IP Visits Last 12 Months (1-3=4, 4=9, >4=11)    5 Pt. Coverage (Medicare=5 , Medicaid, or Self-Pay=4)    2 Charlson Comorbidity Score (Age + Comorbid Conditions)        Criteria that do not apply:    . Living with Significant Other. Assisted Living. LTAC. SNF.  or   Rehab    Patient Length of Stay (>5 days = 3)       Expected Length of Stay 2d 19h   Actual Length of Stay [address]

## 2018-01-09 NOTE — PROGRESS NOTES
Spiritual Care Assessment/Progress Notes    Sydnee Davis [id number]  [id number]    1960  56 y.o.  female    Patient Telephone Number: [phone] (home)   Alevism Affiliation: Non Restoration   Language: English   Extended Emergency Contact Information  Primary Emergency Contact: Robert Heard Phone: [phone]  Mobile Phone: [phone]  Relation: Brother  Secondary Emergency Contact: [address] [facility]  Mobile Phone: [phone]  Relation: Daughter   Patient Active Problem List    Diagnosis Date Noted    Sickle cell pain crisis ([facility] [address] 01/05/2018    Pulsatile tinnitus 10/31/2017    NSVT (nonsustained ventricular tachycardia) ([facility] [address] 05/29/2017    Sickle cell crisis ([facility] [address] 05/22/2017    Orbital fracture ([facility] [address] 11/20/2016    Furuncle of axilla 07/21/2016    Depression 10/05/2014    Sickle cell anemia ([facility] [address] 10/03/2014    Hypertension 10/03/2014    Venous insufficiency 10/03/2014    Hepatitis C 10/03/2014        Date: 1/9/2018       Level of Alevism/Spiritual Activity:  []         Involved in zay tradition/spiritual practice    []         Not involved in zay tradition/spiritual practice  [x]         Spiritually oriented    []         Claims no spiritual orientation    []         seeking spiritual identity  []         Feels alienated from Mormon practice/tradition  []         Feels angry about Mormon practice/tradition  [x]         Spirituality/Mormon tradition is a resource for coping at this time.   []         Not able to assess due to medical condition    Services Provided Today:  []         crisis intervention    []         reading Scriptures  [x]         spiritual assessment    [x]         prayer  [x]         empathic listening/emotional support  []         rites and rituals (cite in comments)  [x]         life review     []         Mormon support  []         theological development    []         advocacy  [] ethical dialog     []         blessing  []         bereavement support    []         support to family  []         anticipatory grief support   []         help with AMD  []         spiritual guidance    []         meditation      Spiritual Care Needs  []         Emotional Support  []         Spiritual/Denominational Care  []         Loss/Adjustment  []         Advocacy/Referral                /Ethics  [x]         No needs expressed at               this time  []         Other: (note in               comments)  [address]  []         Follow up visits with               pt/family  []         Provide materials  []         Schedule sacraments  []         Contact Community               Clergy  [x]         Follow up as needed  []         Other: (note in               comments)     Comments:   Supportive visit on Oncology unit for spiritual assessment. No visitors present. Provided listening presence as patient spoke about her illness. 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 She also spends time helping her mother care for her 80year old father and driving her uncle to doctor appointments. Ms. Xochitl Chamberlain is very proud of her son and daughter who both have degrees in Chemistry. Her only grandchild is a year old and lives in [place]. She shared that she is feeling better, smiled throughout the visit, and appears to be coping effectively. She is well supported by family, as well as having good spiritual support. Offered assurance of prayer and advised of  availability.   ROGE Mohan, [facility], [address] [place] [address] Avenue    [address] Paging Service  [phone]

## 2018-01-09 NOTE — PROGRESS NOTES
General Daily Progress Note    Admit Date: 1/5/2018    Subjective:     Patient continues to complain of shoulder back and leg pain. Current Facility-Administered Medications   Medication Dose Route Frequency    polyethylene glycol (MIRALAX) packet 17 g  17 g Oral DAILY    magnesium hydroxide (MILK OF MAGNESIA) 400 mg/5 mL oral suspension 30 mL  30 mL Oral DAILY PRN    0.9% sodium chloride 1,000 mL with potassium chloride 10 mEq infusion   IntraVENous CONTINUOUS    mupirocin (BACTROBAN) 2 % ointment   Both Nostrils Q12H    citalopram (CELEXA) tablet 10 mg  10 mg Oral QHS    folic acid (FOLVITE) tablet 1 mg  1 mg Oral DAILY    pantoprazole (PROTONIX) tablet 40 mg  40 mg Oral ACB    valsartan (DIOVAN) tablet 40 mg  40 mg Oral DAILY    HYDROmorphone (DILAUDID) injection 1 mg  1 mg IntraVENous Q3H PRN    acetaminophen (TYLENOL) tablet 650 mg  650 mg Oral Q4H PRN    diphenhydrAMINE (BENADRYL) injection 25 mg  25 mg IntraVENous Q4H PRN    ondansetron (ZOFRAN) injection 4 mg  4 mg IntraVENous Q4H PRN    heparin (porcine) injection 5,000 Units  5,000 Units SubCUTAneous Q12H        Review of Systems  A comprehensive review of systems was negative.     Objective:     Patient Vitals for the past 24 hrs:   BP Temp Pulse Resp SpO2   01/09/18 0817 (!) 140/92 98.9 °F (37.2 °C) 91 18 100 %   01/09/18 0300 120/71 99.1 °F (37.3 °C) 96 16 97 %   01/08/18 2305 (!) 152/91 99.4 °F (37.4 °C) 95 16 100 %   01/08/18 2006 140/83 95 °F (35 °C) 95 16 100 %   01/08/18 1150 140/85 99.1 °F (37.3 °C) 92 16 100 %        01/07 1901 - 01/09 0700  In: 2277.1 [I.V.:2277.1]  Out: -     Physical Exam:   Visit Vitals    BP (!) 140/92 (BP 1 Location: Left arm, BP Patient Position: Sitting)    Pulse 91    Temp 98.9 °F (37.2 °C)    Resp 18    Ht 5' 10\" (1.778 m)    Wt 190 lb 7.6 oz (86.4 kg)    SpO2 100%    BMI 27.33 kg/m2     General appearance: alert, cooperative, no distress, appears stated age  Neck: supple, symmetrical, trachea midline, no adenopathy, thyroid: not enlarged, symmetric, no tenderness/mass/nodules, no carotid bruit and no JVD  Lungs: clear to auscultation bilaterally  Heart: regular rate and rhythm, S1, S2 normal, no murmur, click, rub or gallop  Abdomen: soft, non-tender. Bowel sounds normal. No masses,  no organomegaly  Extremities: extremities normal, atraumatic, no cyanosis or edema        Data Review   Recent Results (from the past 24 hour(s))   CBC WITH MANUAL DIFF    Collection Time: 01/09/18  5:05 AM   Result Value Ref Range    WBC 11.1 (H) 3.6 - 11.0 K/uL    RBC 2.18 (L) 3.80 - 5.20 M/uL    HGB 7.2 (L) 11.5 - 16.0 g/dL    HCT 21.2 (L) 35.0 - 47.0 %    MCV 97.2 80.0 - 99.0 FL    MCH 33.0 26.0 - 34.0 PG    MCHC 34.0 30.0 - 36.5 g/dL    RDW 16.5 (H) 11.5 - 14.5 %    PLATELET 647 (L) 936 - 400 K/uL    NEUTROPHILS 21 %    BAND NEUTROPHILS 0 %    LYMPHOCYTES 71 %    MONOCYTES 7 %    EOSINOPHILS 1 %    BASOPHILS 0 %    METAMYELOCYTES 0 %    MYELOCYTES 0 %    PROMYELOCYTES 0 %    BLASTS 0 %    OTHER CELL 0      ABS. NEUTROPHILS 2.3 K/UL    ABS. LYMPHOCYTES 7.9 K/UL    ABS. MONOCYTES 0.8 K/UL    ABS. EOSINOPHILS 0.1 K/UL    ABS. BASOPHILS 0.0 K/UL    PLATELET COMMENTS LARGE PLATELETS      RBC COMMENTS SICKLE CELLS  PRESENT        RBC COMMENTS MACROCYTOSIS  PRESENT        RBC COMMENTS TARGET CELLS  PRESENT        RBC COMMENTS POLYCHROMASIA  PRESENT        DF MANUAL      NRBC 1.8 (H) 0  WBC    ABSOLUTE NRBC 0.20 (H) 0.00 - 0.01 K/uL    WBC CORRECTED FOR NR ADJUSTED FOR NUCLEATED RBC'S      DIFFERENTIAL MANUAL DIFFERENTIAL ORDERED             Assessment:     Principal Problem:    Sickle cell pain crisis ([facility] [address] (1/5/2018)    Active Problems:    Venous insufficiency (10/3/2014)        Plan:     1. Continue with current treatment regimen for painful sickle crises. Improvement is low but progressive. 2.  Continue hydration and oxygen. 3.  Will mobilize.

## 2018-01-09 NOTE — PROGRESS NOTES
RN spoke with Dr. Joseph Sanchez regarding PICC order. PICC RN has offered to help with blood draws and patient currently has working IV access. Per Dr. Joseph Sanchez we can disregard PICC order for now.

## 2018-01-10 LAB
BASOPHILS # BLD: 0 K/UL
BASOPHILS NFR BLD: 0 %
BLASTS NFR BLD MANUAL: 0 %
DIFFERENTIAL METHOD BLD: ABNORMAL
EOSINOPHIL # BLD: 0.3 K/UL
EOSINOPHIL NFR BLD: 3 %
ERYTHROCYTE [DISTWIDTH] IN BLOOD BY AUTOMATED COUNT: 16.9 % (ref 11.5–14.5)
HCT VFR BLD AUTO: 19.3 % (ref 35–47)
HGB BLD-MCNC: 6.6 G/DL (ref 11.5–16)
LYMPHOCYTES # BLD: 4.5 K/UL
LYMPHOCYTES NFR BLD: 50 %
MANUAL DIFFERENTIAL PERFORMED BLD QL: ABNORMAL
MCH RBC QN AUTO: 32.2 PG (ref 26–34)
MCHC RBC AUTO-ENTMCNC: 34.2 G/DL (ref 30–36.5)
MCV RBC AUTO: 94.1 FL (ref 80–99)
METAMYELOCYTES NFR BLD MANUAL: 0 %
MONOCYTES # BLD: 0.4 K/UL
MONOCYTES NFR BLD: 4 %
MYELOCYTES NFR BLD MANUAL: 0 %
NEUTS BAND NFR BLD MANUAL: 0 %
NEUTS SEG # BLD: 4 K/UL
NEUTS SEG NFR BLD: 43 %
NRBC # BLD: 0.18 K/UL (ref 0–0.01)
NRBC BLD-RTO: 2 PER 100 WBC
OTHER CELLS NFR BLD MANUAL: 0 %
PLATELET # BLD AUTO: 172 K/UL (ref 150–400)
PLATELET COMMENTS,PCOM: ABNORMAL
PROMYELOCYTES NFR BLD MANUAL: 0 %
RBC # BLD AUTO: 2.05 M/UL (ref 3.8–5.2)
RBC MORPH BLD: ABNORMAL
WBC # BLD AUTO: 9.2 K/UL (ref 3.6–11)
WBC NRBC COR # BLD: ABNORMAL 10*3/UL

## 2018-01-10 PROCEDURE — 74011250637 HC RX REV CODE- 250/637: Performed by: INTERNAL MEDICINE

## 2018-01-10 PROCEDURE — 77010033678 HC OXYGEN DAILY

## 2018-01-10 PROCEDURE — 36415 COLL VENOUS BLD VENIPUNCTURE: CPT | Performed by: INTERNAL MEDICINE

## 2018-01-10 PROCEDURE — 74011250636 HC RX REV CODE- 250/636: Performed by: INTERNAL MEDICINE

## 2018-01-10 PROCEDURE — 65660000000 HC RM CCU STEPDOWN

## 2018-01-10 PROCEDURE — 85027 COMPLETE CBC AUTOMATED: CPT | Performed by: INTERNAL MEDICINE

## 2018-01-10 PROCEDURE — 94762 N-INVAS EAR/PLS OXIMTRY CONT: CPT

## 2018-01-10 RX ORDER — SODIUM CHLORIDE AND POTASSIUM CHLORIDE .9; .15 G/100ML; G/100ML
SOLUTION INTRAVENOUS CONTINUOUS
Status: DISCONTINUED | OUTPATIENT
Start: 2018-01-10 | End: 2018-01-11

## 2018-01-10 RX ADMIN — ONDANSETRON HYDROCHLORIDE 4 MG: 2 INJECTION, SOLUTION INTRAMUSCULAR; INTRAVENOUS at 03:19

## 2018-01-10 RX ADMIN — DIPHENHYDRAMINE HYDROCHLORIDE 25 MG: 50 INJECTION, SOLUTION INTRAMUSCULAR; INTRAVENOUS at 05:52

## 2018-01-10 RX ADMIN — MUPIROCIN: 20 OINTMENT TOPICAL at 23:53

## 2018-01-10 RX ADMIN — HYDROMORPHONE HYDROCHLORIDE 1 MG: 2 INJECTION INTRAMUSCULAR; INTRAVENOUS; SUBCUTANEOUS at 09:15

## 2018-01-10 RX ADMIN — MUPIROCIN: 20 OINTMENT TOPICAL at 10:32

## 2018-01-10 RX ADMIN — CITALOPRAM HYDROBROMIDE 10 MG: 20 TABLET ORAL at 22:39

## 2018-01-10 RX ADMIN — ONDANSETRON HYDROCHLORIDE 4 MG: 2 INJECTION, SOLUTION INTRAMUSCULAR; INTRAVENOUS at 22:40

## 2018-01-10 RX ADMIN — DIPHENHYDRAMINE HYDROCHLORIDE 25 MG: 50 INJECTION, SOLUTION INTRAMUSCULAR; INTRAVENOUS at 01:12

## 2018-01-10 RX ADMIN — SODIUM CHLORIDE AND POTASSIUM CHLORIDE: 9; 1.49 INJECTION, SOLUTION INTRAVENOUS at 11:15

## 2018-01-10 RX ADMIN — PANTOPRAZOLE SODIUM 40 MG: 40 TABLET, DELAYED RELEASE ORAL at 09:13

## 2018-01-10 RX ADMIN — HEPARIN SODIUM 5000 UNITS: 5000 INJECTION, SOLUTION INTRAVENOUS; SUBCUTANEOUS at 01:12

## 2018-01-10 RX ADMIN — POLYETHYLENE GLYCOL 3350 17 G: 17 POWDER, FOR SOLUTION ORAL at 09:13

## 2018-01-10 RX ADMIN — SODIUM CHLORIDE AND POTASSIUM CHLORIDE: 9; 1.49 INJECTION, SOLUTION INTRAVENOUS at 23:53

## 2018-01-10 RX ADMIN — HEPARIN SODIUM 5000 UNITS: 5000 INJECTION, SOLUTION INTRAVENOUS; SUBCUTANEOUS at 11:13

## 2018-01-10 RX ADMIN — HYDROMORPHONE HYDROCHLORIDE 1 MG: 2 INJECTION INTRAMUSCULAR; INTRAVENOUS; SUBCUTANEOUS at 17:05

## 2018-01-10 RX ADMIN — DIPHENHYDRAMINE HYDROCHLORIDE 25 MG: 50 INJECTION, SOLUTION INTRAMUSCULAR; INTRAVENOUS at 20:24

## 2018-01-10 RX ADMIN — SODIUM CHLORIDE: 900 INJECTION, SOLUTION INTRAVENOUS at 06:38

## 2018-01-10 RX ADMIN — HYDROMORPHONE HYDROCHLORIDE 1 MG: 2 INJECTION INTRAMUSCULAR; INTRAVENOUS; SUBCUTANEOUS at 13:38

## 2018-01-10 RX ADMIN — FOLIC ACID 1 MG: 1 TABLET ORAL at 09:14

## 2018-01-10 RX ADMIN — VALSARTAN 40 MG: 40 TABLET ORAL at 09:13

## 2018-01-10 RX ADMIN — HYDROMORPHONE HYDROCHLORIDE 1 MG: 2 INJECTION INTRAMUSCULAR; INTRAVENOUS; SUBCUTANEOUS at 01:12

## 2018-01-10 RX ADMIN — DIPHENHYDRAMINE HYDROCHLORIDE 25 MG: 50 INJECTION, SOLUTION INTRAMUSCULAR; INTRAVENOUS at 13:37

## 2018-01-10 RX ADMIN — HYDROMORPHONE HYDROCHLORIDE 1 MG: 2 INJECTION INTRAMUSCULAR; INTRAVENOUS; SUBCUTANEOUS at 05:52

## 2018-01-10 RX ADMIN — MAGNESIUM HYDROXIDE 30 ML: 400 SUSPENSION ORAL at 14:56

## 2018-01-10 RX ADMIN — HYDROMORPHONE HYDROCHLORIDE 1 MG: 2 INJECTION INTRAMUSCULAR; INTRAVENOUS; SUBCUTANEOUS at 20:27

## 2018-01-10 RX ADMIN — DIPHENHYDRAMINE HYDROCHLORIDE 25 MG: 50 INJECTION, SOLUTION INTRAMUSCULAR; INTRAVENOUS at 09:15

## 2018-01-10 RX ADMIN — DIPHENHYDRAMINE HYDROCHLORIDE 25 MG: 50 INJECTION, SOLUTION INTRAMUSCULAR; INTRAVENOUS at 17:05

## 2018-01-10 NOTE — PROGRESS NOTES
Oncology Nursing Communication Tool  7:28  PM  1/10/2018     Bedside and Verbal shift change report given to [facility] AT DMITRY LOU, SAMIR (incoming nurse) by Toney Perdue (outgoing nurse) on Kleber Turner. Report included the following information SBAR, Kardex, Intake/Output, MAR, Accordion, Recent Results and Cardiac Rhythm NSR. Shift Summary: Patient rested comfortably throughout shift. Patients IV fluids were changed. Patients morning hgb was 6.6, Dr. Nelson Rivera was made aware by RN, no orders were received. Patient remains on continuous pulse ox and 1 L O2 during shift. Issues for physician to address: pain, hgb        Oncology Shift Note   Admission Date 1/5/2018   Admission Diagnosis Sickle cell pain crisis ([facility] [address]   Code Status Full Code   Consults IP CONSULT TO HOSPITALIST      Cardiac Monitoring [x] Yes [] No      Purposeful Hourly Rounding [x] Yes    Mukund Score Total Score: 2   Mukund score 3 or > [] Bed Alarm [] Avasys [] 1:1 sitter [] Patient refused (Place signed refusal form in chart)      Pain Managed [] Yes [x] No    Key Pain Meds             HYDROcodone-acetaminophen (NORCO)  mg tablet  (Taking) Take 1 Tab by mouth every six (6) hours as needed for Pain. Max Daily Amount: 4 Tabs. Influenza Vaccine Received Flu Vaccine for Current Season (usually Sept-March): Yes           Oxygen needs?  [] Room air Oxygen @  [x]1L    []2L    []3L   []4L    []5L   []6L     Use home O2? [] Yes [] No  Perform O2 challenge test using  smartphrase (.oxygenchallenge)      Last bowel movement Last Bowel Movement Date: 01/03/18  bowel movement      Urinary Catheter             LDAs               Peripheral IV 11/28/30 Left Cephalic (Active)   Site Assessment Clean, dry, & intact 1/10/2018  2:50 PM   Phlebitis Assessment 0 1/10/2018  2:50 PM   Infiltration Assessment 0 1/10/2018  2:50 PM   Dressing Status Clean, dry, & intact 1/10/2018  2:50 PM   Dressing Type Tape;Transparent 1/10/2018  2:50 PM   Hub Color/Line Status Pink 1/10/2018  2:50 PM   Action Taken Other (comment) 1/8/2018  6:02 PM                         Readmission Risk Assessment Tool Score Medium Risk            14       Total Score        3 Has Seen PCP in Last 6 Months (Yes=3, No=0)    4 IP Visits Last 12 Months (1-3=4, 4=9, >4=11)    5 Pt. Coverage (Medicare=5 , Medicaid, or Self-Pay=4)    2 Charlson Comorbidity Score (Age + Comorbid Conditions)        Criteria that do not apply:    . Living with Significant Other. Assisted Living. LTAC. SNF.  or   Rehab    Patient Length of Stay (>5 days = 3)       Expected Length of Stay 2d 19h   Actual Length of Stay [address] [facility] [address]

## 2018-01-10 NOTE — PROGRESS NOTES
RN contacted Dr. Funmilayo James to inform of hgb of 6.6. No additional orders received at this time. Patient resting comfortably.

## 2018-01-10 NOTE — PROGRESS NOTES
General Daily Progress Note    Admit Date: 1/5/2018    Subjective:     Patient continues to complain of generalized pain. Current Facility-Administered Medications   Medication Dose Route Frequency    0.9% sodium chloride 1,000 mL with potassium chloride 20 mEq infusion   IntraVENous CONTINUOUS    polyethylene glycol (MIRALAX) packet 17 g  17 g Oral DAILY    magnesium hydroxide (MILK OF MAGNESIA) 400 mg/5 mL oral suspension 30 mL  30 mL Oral DAILY PRN    diphenhydrAMINE (BENADRYL) injection 25 mg  25 mg IntraVENous Q3H PRN    mupirocin (BACTROBAN) 2 % ointment   Both Nostrils Q12H    citalopram (CELEXA) tablet 10 mg  10 mg Oral QHS    folic acid (FOLVITE) tablet 1 mg  1 mg Oral DAILY    pantoprazole (PROTONIX) tablet 40 mg  40 mg Oral ACB    valsartan (DIOVAN) tablet 40 mg  40 mg Oral DAILY    HYDROmorphone (DILAUDID) injection 1 mg  1 mg IntraVENous Q3H PRN    acetaminophen (TYLENOL) tablet 650 mg  650 mg Oral Q4H PRN    ondansetron (ZOFRAN) injection 4 mg  4 mg IntraVENous Q4H PRN    heparin (porcine) injection 5,000 Units  5,000 Units SubCUTAneous Q12H        Review of Systems  A comprehensive review of systems was negative. Objective:     Patient Vitals for the past 24 hrs:   BP Temp Pulse Resp SpO2   01/10/18 0853 141/88 98.8 °F (37.1 °C) 84 18 97 %   01/10/18 0555 - - 91 - 99 %   01/10/18 0320 134/83 99 °F (37.2 °C) 90 18 98 %   01/09/18 2246 141/81 98.9 °F (37.2 °C) 95 18 98 %   01/09/18 2032 145/85 98.7 °F (37.1 °C) 92 18 100 %   01/09/18 1515 140/89 99 °F (37.2 °C) 96 18 100 %   01/09/18 1330 154/81 97.6 °F (36.4 °C) 90 18 -   01/09/18 1112 112/67 98.8 °F (37.1 °C) 90 16 100 %     01/10 0701 - 01/10 1900  In: 240 [P.O.:240]  Out: -   01/08 1901 - 01/10 0700  In: 0649 [P.O.:100;  I.V.:1130]  Out: -     Physical Exam:   Visit Vitals    /88 (BP 1 Location: Right arm, BP Patient Position: At rest)    Pulse 84    Temp 98.8 °F (37.1 °C)    Resp 18    Ht 5' 10\" (1.778 m)    Wt 190 lb 7.6 oz (86.4 kg)    SpO2 97%    BMI 27.33 kg/m2     General appearance: alert, cooperative, no distress, appears stated age  Neck: supple, symmetrical, trachea midline, no adenopathy, thyroid: not enlarged, symmetric, no tenderness/mass/nodules, no carotid bruit and no JVD  Lungs: clear to auscultation bilaterally  Heart: regular rate and rhythm, S1, S2 normal, no murmur, click, rub or gallop  Abdomen: soft, non-tender. Bowel sounds normal. No masses,  no organomegaly  Extremities: extremities normal, atraumatic, no cyanosis or edema        Data Review   Recent Results (from the past 24 hour(s))   METABOLIC PANEL, COMPREHENSIVE    Collection Time: 01/09/18 11:01 AM   Result Value Ref Range    Sodium 141 136 - 145 mmol/L    Potassium 3.8 3.5 - 5.1 mmol/L    Chloride 106 97 - 108 mmol/L    CO2 30 21 - 32 mmol/L    Anion gap 5 5 - 15 mmol/L    Glucose 117 (H) 65 - 100 mg/dL    BUN 4 (L) 6 - 20 MG/DL    Creatinine 0.75 0.55 - 1.02 MG/DL    BUN/Creatinine ratio 5 (L) 12 - 20      GFR est AA >60 >60 ml/min/1.73m2    GFR est non-AA >60 >60 ml/min/1.73m2    Calcium 8.2 (L) 8.5 - 10.1 MG/DL    Bilirubin, total 1.7 (H) 0.2 - 1.0 MG/DL    ALT (SGPT) 17 12 - 78 U/L    AST (SGOT) 31 15 - 37 U/L    Alk. phosphatase 86 45 - 117 U/L    Protein, total 7.7 6.4 - 8.2 g/dL    Albumin 3.3 (L) 3.5 - 5.0 g/dL    Globulin 4.4 (H) 2.0 - 4.0 g/dL    A-G Ratio 0.8 (L) 1.1 - 2.2     CBC WITH MANUAL DIFF    Collection Time: 01/10/18  3:29 AM   Result Value Ref Range    WBC 9.2 3.6 - 11.0 K/uL    RBC 2.05 (L) 3.80 - 5.20 M/uL    HGB 6.6 (L) 11.5 - 16.0 g/dL    HCT 19.3 (L) 35.0 - 47.0 %    MCV 94.1 80.0 - 99.0 FL    MCH 32.2 26.0 - 34.0 PG    MCHC 34.2 30.0 - 36.5 g/dL    RDW 16.9 (H) 11.5 - 14.5 %    PLATELET 212 554 - 159 K/uL    NEUTROPHILS 43 %    BAND NEUTROPHILS 0 %    LYMPHOCYTES 50 %    MONOCYTES 4 %    EOSINOPHILS 3 %    BASOPHILS 0 %    METAMYELOCYTES 0 %    MYELOCYTES 0 %    PROMYELOCYTES 0 %    BLASTS 0 %    OTHER CELL 0      ABS. NEUTROPHILS 4.0 K/UL    ABS. LYMPHOCYTES 4.5 K/UL    ABS. MONOCYTES 0.4 K/UL    ABS. EOSINOPHILS 0.3 K/UL    ABS. BASOPHILS 0.0 K/UL    PLATELET COMMENTS LARGE PLATELETS      RBC COMMENTS ANISOCYTOSIS  1+        RBC COMMENTS SICKLE CELLS  TARGET CELLS  2+        RBC COMMENTS POLYCHROMASIA  1+        DF MANUAL      NRBC 2.0 (H) 0  WBC    ABSOLUTE NRBC 0.18 (H) 0.00 - 0.01 K/uL    WBC CORRECTED FOR NR ADJUSTED FOR NUCLEATED RBC'S      DIFFERENTIAL MANUAL DIFFERENTIAL ORDERED             Assessment:     Principal Problem:    Sickle cell pain crisis ([facility] [address] (1/5/2018)    Active Problems:    Venous insufficiency (10/3/2014)        Plan:     1. Continue treatment of painful crises. Gradual improvement. 2.  Will mobilize with gait and up in chair most of day.

## 2018-01-10 NOTE — PROGRESS NOTES
Oncology Interdisciplinary rounds were held today to discuss patient plan of care and outcomes. The following members were present: Nursing and Case Management. ALOS: 5  DRG GLOS: 2.8    Plan for Day Mobility Plan for Stay Discharge Disposition   Continue cardiac monitoring  HGB low- ?  Blood transfusion  Continue pulse ox  Incentive spirometer  Wean oxygen PRN Up ad erum  Up for meals Monitor oxygen levels  Pain management Home

## 2018-01-10 NOTE — ROUTINE PROCESS
Oncology Nursing Communication Tool  8:22 AM  1/10/2018     Bedside shift change report given to Keli Juárez RN (incoming nurse) by Stefany Millan RN (outgoing nurse) on [facility]. Report included the following information SBAR, Kardex, Intake/Output, MAR and Recent Results. Shift Summary: pt medicated with benadryl & dilaudid iv x2 overnight & also medicated x1 with Zofran for pt c/o of nausea. Pt resting quietly between doses of pain med. VSS, cont pulse ox in use with sats 92-98%. Incentive spirometer given & pt instructed in use & demonstrated proficiency. Issues for physician to address: labs. hgb 6.7 this am.         Oncology Shift Note   Admission Date 1/5/2018   Admission Diagnosis Sickle cell pain crisis ([facility] [address]   Code Status Full Code   Consults IP CONSULT TO HOSPITALIST      Cardiac Monitoring [x] Yes [] No      Purposeful Hourly Rounding [x] Yes    Mukund Score Total Score: 2   Mukund score 3 or > [] Bed Alarm [] Avasys [] 1:1 sitter [] Patient refused (Place signed refusal form in chart)      Pain Managed [x] Yes [] No    Key Pain Meds             HYDROcodone-acetaminophen (NORCO)  mg tablet  (Taking) Take 1 Tab by mouth every six (6) hours as needed for Pain. Max Daily Amount: 4 Tabs. Influenza Vaccine Received Flu Vaccine for Current Season (usually Sept-March): Yes           Oxygen needs?  [] Room air Oxygen @  []1L    [x]2L    []3L   []4L    []5L   []6L     Use home O2? [] Yes [x] No  Perform O2 challenge test using  smartphrase (.oxygenchallenge)      Last bowel movement Last Bowel Movement Date: 01/03/18  bowel movement      Urinary Catheter             LDAs               Peripheral IV 69/29/42 Left Cephalic (Active)   Site Assessment Clean, dry, & intact 1/10/2018  1:12 AM   Phlebitis Assessment 0 1/10/2018  1:12 AM   Infiltration Assessment 0 1/10/2018  1:12 AM   Dressing Status Clean, dry, & intact 1/10/2018  1:12 AM   Dressing Type Tape;Transparent 1/10/2018  1:12 AM   Hub Color/Line Status Pink; Infusing;Flushed 1/10/2018  1:12 AM   Action Taken Other (comment) 1/8/2018  6:02 PM                         Readmission Risk Assessment Tool Score Medium Risk            14       Total Score        3 Has Seen PCP in Last 6 Months (Yes=3, No=0)    4 IP Visits Last 12 Months (1-3=4, 4=9, >4=11)    5 Pt. Coverage (Medicare=5 , Medicaid, or Self-Pay=4)    2 Charlson Comorbidity Score (Age + Comorbid Conditions)        Criteria that do not apply:    . Living with Significant Other. Assisted Living. LTAC. SNF.  or   Rehab    Patient Length of Stay (>5 days = 3)       Expected Length of Stay 2d 19h   Actual Length of Stay [address], RN

## 2018-01-11 LAB
ALBUMIN SERPL-MCNC: 3.1 G/DL (ref 3.5–5)
ALBUMIN/GLOB SERPL: 0.7 {RATIO} (ref 1.1–2.2)
ALP SERPL-CCNC: 93 U/L (ref 45–117)
ALT SERPL-CCNC: 16 U/L (ref 12–78)
ANION GAP SERPL CALC-SCNC: 3 MMOL/L (ref 5–15)
AST SERPL-CCNC: 34 U/L (ref 15–37)
BILIRUB SERPL-MCNC: 1.5 MG/DL (ref 0.2–1)
BUN SERPL-MCNC: 6 MG/DL (ref 6–20)
BUN/CREAT SERPL: 9 (ref 12–20)
CALCIUM SERPL-MCNC: 8.4 MG/DL (ref 8.5–10.1)
CHLORIDE SERPL-SCNC: 103 MMOL/L (ref 97–108)
CO2 SERPL-SCNC: 32 MMOL/L (ref 21–32)
CREAT SERPL-MCNC: 0.68 MG/DL (ref 0.55–1.02)
GLOBULIN SER CALC-MCNC: 4.5 G/DL (ref 2–4)
GLUCOSE SERPL-MCNC: 79 MG/DL (ref 65–100)
POTASSIUM SERPL-SCNC: 4.8 MMOL/L (ref 3.5–5.1)
PROT SERPL-MCNC: 7.6 G/DL (ref 6.4–8.2)
SODIUM SERPL-SCNC: 138 MMOL/L (ref 136–145)

## 2018-01-11 PROCEDURE — 74011250636 HC RX REV CODE- 250/636: Performed by: INTERNAL MEDICINE

## 2018-01-11 PROCEDURE — 74011250637 HC RX REV CODE- 250/637: Performed by: INTERNAL MEDICINE

## 2018-01-11 PROCEDURE — 80053 COMPREHEN METABOLIC PANEL: CPT | Performed by: INTERNAL MEDICINE

## 2018-01-11 PROCEDURE — 74011000258 HC RX REV CODE- 258: Performed by: INTERNAL MEDICINE

## 2018-01-11 PROCEDURE — 77010033678 HC OXYGEN DAILY

## 2018-01-11 PROCEDURE — 65660000000 HC RM CCU STEPDOWN

## 2018-01-11 PROCEDURE — 36415 COLL VENOUS BLD VENIPUNCTURE: CPT | Performed by: INTERNAL MEDICINE

## 2018-01-11 RX ADMIN — HYDROMORPHONE HYDROCHLORIDE 1 MG: 2 INJECTION INTRAMUSCULAR; INTRAVENOUS; SUBCUTANEOUS at 10:30

## 2018-01-11 RX ADMIN — HEPARIN SODIUM 5000 UNITS: 5000 INJECTION, SOLUTION INTRAVENOUS; SUBCUTANEOUS at 15:07

## 2018-01-11 RX ADMIN — ONDANSETRON HYDROCHLORIDE 4 MG: 2 INJECTION, SOLUTION INTRAMUSCULAR; INTRAVENOUS at 21:55

## 2018-01-11 RX ADMIN — PANTOPRAZOLE SODIUM 40 MG: 40 TABLET, DELAYED RELEASE ORAL at 08:19

## 2018-01-11 RX ADMIN — DIPHENHYDRAMINE HYDROCHLORIDE 25 MG: 50 INJECTION, SOLUTION INTRAMUSCULAR; INTRAVENOUS at 06:27

## 2018-01-11 RX ADMIN — DEXTROSE MONOHYDRATE AND SODIUM CHLORIDE: 5; .9 INJECTION, SOLUTION INTRAVENOUS at 10:38

## 2018-01-11 RX ADMIN — HYDROMORPHONE HYDROCHLORIDE 1 MG: 2 INJECTION INTRAMUSCULAR; INTRAVENOUS; SUBCUTANEOUS at 03:24

## 2018-01-11 RX ADMIN — CITALOPRAM HYDROBROMIDE 10 MG: 20 TABLET ORAL at 21:53

## 2018-01-11 RX ADMIN — HYDROMORPHONE HYDROCHLORIDE 1 MG: 2 INJECTION INTRAMUSCULAR; INTRAVENOUS; SUBCUTANEOUS at 21:58

## 2018-01-11 RX ADMIN — VALSARTAN 40 MG: 40 TABLET ORAL at 08:20

## 2018-01-11 RX ADMIN — DIPHENHYDRAMINE HYDROCHLORIDE 25 MG: 50 INJECTION, SOLUTION INTRAMUSCULAR; INTRAVENOUS at 03:24

## 2018-01-11 RX ADMIN — ONDANSETRON HYDROCHLORIDE 4 MG: 2 INJECTION, SOLUTION INTRAMUSCULAR; INTRAVENOUS at 12:36

## 2018-01-11 RX ADMIN — FOLIC ACID 1 MG: 1 TABLET ORAL at 08:19

## 2018-01-11 RX ADMIN — HYDROMORPHONE HYDROCHLORIDE 1 MG: 2 INJECTION INTRAMUSCULAR; INTRAVENOUS; SUBCUTANEOUS at 00:25

## 2018-01-11 RX ADMIN — MUPIROCIN: 20 OINTMENT TOPICAL at 10:34

## 2018-01-11 RX ADMIN — DIPHENHYDRAMINE HYDROCHLORIDE 25 MG: 50 INJECTION, SOLUTION INTRAMUSCULAR; INTRAVENOUS at 10:29

## 2018-01-11 RX ADMIN — HYDROMORPHONE HYDROCHLORIDE 1 MG: 2 INJECTION INTRAMUSCULAR; INTRAVENOUS; SUBCUTANEOUS at 18:42

## 2018-01-11 RX ADMIN — DIPHENHYDRAMINE HYDROCHLORIDE 25 MG: 50 INJECTION, SOLUTION INTRAMUSCULAR; INTRAVENOUS at 21:58

## 2018-01-11 RX ADMIN — DIPHENHYDRAMINE HYDROCHLORIDE 25 MG: 50 INJECTION, SOLUTION INTRAMUSCULAR; INTRAVENOUS at 14:58

## 2018-01-11 RX ADMIN — DIPHENHYDRAMINE HYDROCHLORIDE 25 MG: 50 INJECTION, SOLUTION INTRAMUSCULAR; INTRAVENOUS at 18:42

## 2018-01-11 RX ADMIN — ONDANSETRON HYDROCHLORIDE 4 MG: 2 INJECTION, SOLUTION INTRAMUSCULAR; INTRAVENOUS at 17:14

## 2018-01-11 RX ADMIN — HEPARIN SODIUM 5000 UNITS: 5000 INJECTION, SOLUTION INTRAVENOUS; SUBCUTANEOUS at 00:27

## 2018-01-11 RX ADMIN — DIPHENHYDRAMINE HYDROCHLORIDE 25 MG: 50 INJECTION, SOLUTION INTRAMUSCULAR; INTRAVENOUS at 00:23

## 2018-01-11 RX ADMIN — ONDANSETRON HYDROCHLORIDE 4 MG: 2 INJECTION, SOLUTION INTRAMUSCULAR; INTRAVENOUS at 04:34

## 2018-01-11 RX ADMIN — MUPIROCIN: 20 OINTMENT TOPICAL at 21:53

## 2018-01-11 RX ADMIN — ONDANSETRON HYDROCHLORIDE 4 MG: 2 INJECTION, SOLUTION INTRAMUSCULAR; INTRAVENOUS at 08:19

## 2018-01-11 RX ADMIN — HYDROMORPHONE HYDROCHLORIDE 1 MG: 2 INJECTION INTRAMUSCULAR; INTRAVENOUS; SUBCUTANEOUS at 06:27

## 2018-01-11 RX ADMIN — HYDROMORPHONE HYDROCHLORIDE 1 MG: 2 INJECTION INTRAMUSCULAR; INTRAVENOUS; SUBCUTANEOUS at 14:58

## 2018-01-11 RX ADMIN — POLYETHYLENE GLYCOL 3350 17 G: 17 POWDER, FOR SOLUTION ORAL at 08:19

## 2018-01-11 NOTE — PROGRESS NOTES
General Daily Progress Note    Admit Date: 1/5/2018    Subjective:     Patient continues to complain of pain. Current Facility-Administered Medications   Medication Dose Route Frequency    dextrose 5% and 0.9% NaCl 1,000 mL with potassium chloride 10 mEq infusion   IntraVENous CONTINUOUS    polyethylene glycol (MIRALAX) packet 17 g  17 g Oral DAILY    magnesium hydroxide (MILK OF MAGNESIA) 400 mg/5 mL oral suspension 30 mL  30 mL Oral DAILY PRN    diphenhydrAMINE (BENADRYL) injection 25 mg  25 mg IntraVENous Q3H PRN    mupirocin (BACTROBAN) 2 % ointment   Both Nostrils Q12H    citalopram (CELEXA) tablet 10 mg  10 mg Oral QHS    folic acid (FOLVITE) tablet 1 mg  1 mg Oral DAILY    pantoprazole (PROTONIX) tablet 40 mg  40 mg Oral ACB    valsartan (DIOVAN) tablet 40 mg  40 mg Oral DAILY    HYDROmorphone (DILAUDID) injection 1 mg  1 mg IntraVENous Q3H PRN    acetaminophen (TYLENOL) tablet 650 mg  650 mg Oral Q4H PRN    ondansetron (ZOFRAN) injection 4 mg  4 mg IntraVENous Q4H PRN    heparin (porcine) injection 5,000 Units  5,000 Units SubCUTAneous Q12H        Review of Systems  A comprehensive review of systems was negative.     Objective:     Patient Vitals for the past 24 hrs:   BP Temp Pulse Resp SpO2   01/11/18 0323 143/82 99 °F (37.2 °C) 93 16 95 %   01/10/18 2304 120/69 99.2 °F (37.3 °C) 88 18 99 %   01/10/18 1556 123/70 98.8 °F (37.1 °C) 89 18 100 %   01/10/18 1203 140/88 98.7 °F (37.1 °C) 88 16 100 %   01/10/18 0853 141/88 98.8 °F (37.1 °C) 84 18 97 %        01/09 1901 - 01/11 0700  In: 900 [P.O.:340; I.V.:560]  Out: -     Physical Exam:   Visit Vitals    /82    Pulse 93    Temp 99 °F (37.2 °C)    Resp 16    Ht 5' 10\" (1.778 m)    Wt 190 lb 7.6 oz (86.4 kg)    SpO2 95%    BMI 27.33 kg/m2     General appearance: alert, cooperative, no distress, appears stated age  Neck: supple, symmetrical, trachea midline, no adenopathy, thyroid: not enlarged, symmetric, no tenderness/mass/nodules, no carotid bruit and no JVD  Lungs: clear to auscultation bilaterally  Heart: regular rate and rhythm, S1, S2 normal, no murmur, click, rub or gallop  Abdomen: soft, non-tender. Bowel sounds normal. No masses,  no organomegaly  Extremities: extremities normal, atraumatic, no cyanosis or edema        Data Review   Recent Results (from the past 24 hour(s))   METABOLIC PANEL, COMPREHENSIVE    Collection Time: 01/11/18  3:47 AM   Result Value Ref Range    Sodium 138 136 - 145 mmol/L    Potassium 4.8 3.5 - 5.1 mmol/L    Chloride 103 97 - 108 mmol/L    CO2 32 21 - 32 mmol/L    Anion gap 3 (L) 5 - 15 mmol/L    Glucose 79 65 - 100 mg/dL    BUN 6 6 - 20 MG/DL    Creatinine 0.68 0.55 - 1.02 MG/DL    BUN/Creatinine ratio 9 (L) 12 - 20      GFR est AA >60 >60 ml/min/1.73m2    GFR est non-AA >60 >60 ml/min/1.73m2    Calcium 8.4 (L) 8.5 - 10.1 MG/DL    Bilirubin, total 1.5 (H) 0.2 - 1.0 MG/DL    ALT (SGPT) 16 12 - 78 U/L    AST (SGOT) 34 15 - 37 U/L    Alk. phosphatase 93 45 - 117 U/L    Protein, total 7.6 6.4 - 8.2 g/dL    Albumin 3.1 (L) 3.5 - 5.0 g/dL    Globulin 4.5 (H) 2.0 - 4.0 g/dL    A-G Ratio 0.7 (L) 1.1 - 2.2             Assessment:     Principal Problem:    Sickle cell pain crisis ([facility] [address] (1/5/2018)    Active Problems:    Venous insufficiency (10/3/2014)        Plan:     1. Continues with painful crisis which is gradually improving. Continue current regimen with emphasis on increasing mobility. 2.  Repeat CBC. No transfusions unless hemoglobin drops further. 3.  Will mobilize.

## 2018-01-11 NOTE — PROGRESS NOTES
Oncology Interdisciplinary rounds were held today to discuss patient plan of care and outcomes. The following members were present: Nursing and Case Management.     ALOS: 6  DRG GLOS: 2.8    Plan for Day Mobility Plan for Stay Discharge Disposition   Wean oxygen  Ambulate Up ad erum  Ambulate  In chair for meals Weaning oxygen  Pain management   home

## 2018-01-12 LAB
BASOPHILS # BLD: 0.1 K/UL
BASOPHILS NFR BLD: 1 %
BLASTS NFR BLD MANUAL: 0 %
DIFFERENTIAL METHOD BLD: ABNORMAL
EOSINOPHIL # BLD: 0.4 K/UL
EOSINOPHIL NFR BLD: 5 %
ERYTHROCYTE [DISTWIDTH] IN BLOOD BY AUTOMATED COUNT: 16.4 % (ref 11.5–14.5)
HCT VFR BLD AUTO: 18 % (ref 35–47)
HGB BLD-MCNC: 6.1 G/DL (ref 11.5–16)
LYMPHOCYTES # BLD: 2.7 K/UL
LYMPHOCYTES NFR BLD: 36 %
MANUAL DIFFERENTIAL PERFORMED BLD QL: ABNORMAL
MCH RBC QN AUTO: 31.9 PG (ref 26–34)
MCHC RBC AUTO-ENTMCNC: 33.9 G/DL (ref 30–36.5)
MCV RBC AUTO: 94.2 FL (ref 80–99)
METAMYELOCYTES NFR BLD MANUAL: 0 %
MONOCYTES # BLD: 1.1 K/UL
MONOCYTES NFR BLD: 14 %
MYELOCYTES NFR BLD MANUAL: 0 %
NEUTS BAND NFR BLD MANUAL: 0 %
NEUTS SEG # BLD: 3.3 K/UL
NEUTS SEG NFR BLD: 44 %
NRBC # BLD: 0.08 K/UL (ref 0–0.01)
NRBC BLD-RTO: 1 PER 100 WBC
NRBC BLD-RTO: 3 PER 100 WBC
OTHER CELLS NFR BLD MANUAL: 0 %
PLATELET # BLD AUTO: 169 K/UL (ref 150–400)
PROMYELOCYTES NFR BLD MANUAL: 0 %
RBC # BLD AUTO: 1.91 M/UL (ref 3.8–5.2)
RBC MORPH BLD: ABNORMAL
RETICS/RBC NFR AUTO: 11.7 % (ref 0.7–2.1)
WBC # BLD AUTO: 7.6 K/UL (ref 3.6–11)
WBC NRBC COR # BLD: ABNORMAL 10*3/UL

## 2018-01-12 PROCEDURE — 85027 COMPLETE CBC AUTOMATED: CPT | Performed by: INTERNAL MEDICINE

## 2018-01-12 PROCEDURE — 65660000000 HC RM CCU STEPDOWN

## 2018-01-12 PROCEDURE — 74011250636 HC RX REV CODE- 250/636: Performed by: INTERNAL MEDICINE

## 2018-01-12 PROCEDURE — 36415 COLL VENOUS BLD VENIPUNCTURE: CPT | Performed by: INTERNAL MEDICINE

## 2018-01-12 PROCEDURE — 74011250637 HC RX REV CODE- 250/637: Performed by: INTERNAL MEDICINE

## 2018-01-12 PROCEDURE — 85045 AUTOMATED RETICULOCYTE COUNT: CPT | Performed by: INTERNAL MEDICINE

## 2018-01-12 PROCEDURE — 74011000258 HC RX REV CODE- 258: Performed by: INTERNAL MEDICINE

## 2018-01-12 RX ORDER — HYDROMORPHONE HYDROCHLORIDE 2 MG/ML
1 INJECTION, SOLUTION INTRAMUSCULAR; INTRAVENOUS; SUBCUTANEOUS
Status: DISCONTINUED | OUTPATIENT
Start: 2018-01-12 | End: 2018-01-13

## 2018-01-12 RX ADMIN — ONDANSETRON HYDROCHLORIDE 4 MG: 2 INJECTION, SOLUTION INTRAMUSCULAR; INTRAVENOUS at 07:17

## 2018-01-12 RX ADMIN — DEXTROSE MONOHYDRATE AND SODIUM CHLORIDE: 5; .9 INJECTION, SOLUTION INTRAVENOUS at 00:49

## 2018-01-12 RX ADMIN — ONDANSETRON HYDROCHLORIDE 4 MG: 2 INJECTION, SOLUTION INTRAMUSCULAR; INTRAVENOUS at 21:33

## 2018-01-12 RX ADMIN — HEPARIN SODIUM 5000 UNITS: 5000 INJECTION, SOLUTION INTRAVENOUS; SUBCUTANEOUS at 00:50

## 2018-01-12 RX ADMIN — HYDROMORPHONE HYDROCHLORIDE 1 MG: 2 INJECTION INTRAMUSCULAR; INTRAVENOUS; SUBCUTANEOUS at 04:18

## 2018-01-12 RX ADMIN — FOLIC ACID 1 MG: 1 TABLET ORAL at 09:02

## 2018-01-12 RX ADMIN — HYDROMORPHONE HYDROCHLORIDE 1 MG: 2 INJECTION INTRAMUSCULAR; INTRAVENOUS; SUBCUTANEOUS at 17:23

## 2018-01-12 RX ADMIN — HEPARIN SODIUM 5000 UNITS: 5000 INJECTION, SOLUTION INTRAVENOUS; SUBCUTANEOUS at 14:00

## 2018-01-12 RX ADMIN — DIPHENHYDRAMINE HYDROCHLORIDE 25 MG: 50 INJECTION, SOLUTION INTRAMUSCULAR; INTRAVENOUS at 21:33

## 2018-01-12 RX ADMIN — HYDROMORPHONE HYDROCHLORIDE 1 MG: 2 INJECTION INTRAMUSCULAR; INTRAVENOUS; SUBCUTANEOUS at 07:47

## 2018-01-12 RX ADMIN — DIPHENHYDRAMINE HYDROCHLORIDE 25 MG: 50 INJECTION, SOLUTION INTRAMUSCULAR; INTRAVENOUS at 04:18

## 2018-01-12 RX ADMIN — HYDROMORPHONE HYDROCHLORIDE 1 MG: 2 INJECTION INTRAMUSCULAR; INTRAVENOUS; SUBCUTANEOUS at 21:33

## 2018-01-12 RX ADMIN — PANTOPRAZOLE SODIUM 40 MG: 40 TABLET, DELAYED RELEASE ORAL at 07:47

## 2018-01-12 RX ADMIN — VALSARTAN 40 MG: 40 TABLET ORAL at 09:02

## 2018-01-12 RX ADMIN — HYDROMORPHONE HYDROCHLORIDE 1 MG: 2 INJECTION INTRAMUSCULAR; INTRAVENOUS; SUBCUTANEOUS at 00:52

## 2018-01-12 RX ADMIN — DIPHENHYDRAMINE HYDROCHLORIDE 25 MG: 50 INJECTION, SOLUTION INTRAMUSCULAR; INTRAVENOUS at 00:51

## 2018-01-12 RX ADMIN — DIPHENHYDRAMINE HYDROCHLORIDE 25 MG: 50 INJECTION, SOLUTION INTRAMUSCULAR; INTRAVENOUS at 07:47

## 2018-01-12 RX ADMIN — HYDROMORPHONE HYDROCHLORIDE 1 MG: 2 INJECTION INTRAMUSCULAR; INTRAVENOUS; SUBCUTANEOUS at 12:37

## 2018-01-12 RX ADMIN — ONDANSETRON HYDROCHLORIDE 4 MG: 2 INJECTION, SOLUTION INTRAMUSCULAR; INTRAVENOUS at 17:27

## 2018-01-12 RX ADMIN — ONDANSETRON HYDROCHLORIDE 4 MG: 2 INJECTION, SOLUTION INTRAMUSCULAR; INTRAVENOUS at 12:35

## 2018-01-12 RX ADMIN — POLYETHYLENE GLYCOL 3350 17 G: 17 POWDER, FOR SOLUTION ORAL at 09:02

## 2018-01-12 RX ADMIN — DIPHENHYDRAMINE HYDROCHLORIDE 25 MG: 50 INJECTION, SOLUTION INTRAMUSCULAR; INTRAVENOUS at 17:27

## 2018-01-12 RX ADMIN — DIPHENHYDRAMINE HYDROCHLORIDE 25 MG: 50 INJECTION, SOLUTION INTRAMUSCULAR; INTRAVENOUS at 12:35

## 2018-01-12 RX ADMIN — DEXTROSE MONOHYDRATE AND SODIUM CHLORIDE: 5; .9 INJECTION, SOLUTION INTRAVENOUS at 16:10

## 2018-01-12 RX ADMIN — CITALOPRAM HYDROBROMIDE 10 MG: 20 TABLET ORAL at 21:31

## 2018-01-12 NOTE — PROGRESS NOTES
Oncology Nursing Communication Tool  8:08 PM  1/11/2018     Bedside shift change report given to Fiorella Trevino RN (incoming nurse) by Jorge Sabillon RN (outgoing nurse) on [facility]. Report included the following information SBAR and Kardex. Shift Summary: fluids changed. Issues for physician to address: none         Oncology Shift Note   Admission Date 1/5/2018   Admission Diagnosis Sickle cell pain crisis ([facility] [address]   Code Status Full Code   Consults IP CONSULT TO HOSPITALIST      Cardiac Monitoring [] Yes [] No      Purposeful Hourly Rounding [] Yes    Mukund Score Total Score: 2   Mukund score 3 or > [] Bed Alarm [] Avasys [] 1:1 sitter [] Patient refused (Place signed refusal form in chart)      Pain Managed [] Yes [] No    Key Pain Meds             HYDROcodone-acetaminophen (NORCO)  mg tablet  (Taking) Take 1 Tab by mouth every six (6) hours as needed for Pain. Max Daily Amount: 4 Tabs. Influenza Vaccine Received Flu Vaccine for Current Season (usually Sept-March): Yes           Oxygen needs? [] Room air Oxygen @  []1L    []2L    []3L   []4L    []5L   []6L     Use home O2? [] Yes [] No  Perform O2 challenge test using  smartphrase (.oxygenchallenge)      Last bowel movement Last Bowel Movement Date: 01/03/18  bowel movement      Urinary Catheter             LDAs               Peripheral IV 02/07/07 Left Cephalic (Active)   Site Assessment Clean, dry, & intact 1/11/2018  7:08 PM   Phlebitis Assessment 0 1/11/2018  2:58 PM   Infiltration Assessment 0 1/11/2018  2:58 PM   Dressing Status Clean, dry, & intact 1/11/2018  2:58 PM   Dressing Type Transparent 1/11/2018  2:58 PM   Hub Color/Line Status Pink; Infusing 1/11/2018  2:58 PM   Action Taken Other (comment) 1/8/2018  6:02 PM                         Readmission Risk Assessment Tool Score Medium Risk            17       Total Score        3 Has Seen PCP in Last 6 Months (Yes=3, No=0)    3 Patient Length of Stay (>5 days = 3) 4 IP Visits Last 12 Months (1-3=4, 4=9, >4=11)    5 Pt. Coverage (Medicare=5 , Medicaid, or Self-Pay=4)    2 Charlson Comorbidity Score (Age + Comorbid Conditions)        Criteria that do not apply:    . Living with Significant Other. Assisted Living. LTAC. SNF.  or   Rehab       Expected Length of Stay 2d 19h   Actual Length of Stay [address], RN

## 2018-01-12 NOTE — PROGRESS NOTES
Initial Nutrition Assessment:    INTERVENTIONS/RECOMMENDATIONS:   · Continue regular diet  · If last documented BM is accurate consider a more aggressive bowel regimen    ASSESSMENT:   Chart reviewed, medically noted for sickle cell crisis and PMH shown below. Assessing pt due to LOS. Pt reports of a good appetite and eating the majority of her meals. Flowsheet documentation confirms this. Per weight history, no significant weight loss PTA. Last documented BM was 1/3, currently on miralax.     Past Medical History:   Diagnosis Date    Anemia     SC hemoglobinopathy    Chronic pain     Depression     Hypertension     Liver disease     hepatitis C; pt reports \"cured\"    Sickle cell disease ([facility] [address]        Diet Order: Regular  % Eaten:  Patient Vitals for the past 72 hrs:   % Diet Eaten   01/12/18 0906 100 %   01/10/18 1032 100 %     Pertinent Medications: [x]Reviewed: D5 NS w/ KCl, folic acid, zofran, PPI, miralax  Pertinent Labs: [x]Reviewed:   Food Allergies: [x]NKFA  []Other   Last BM: 1/3  Edema:        []RUE   []LUE   []RLE   []LLE      Pressure Injury:      [] Stage I   [] Stage II   [] Stage III   [] Stage IV      Wt Readings from Last 30 Encounters:   01/05/18 86.4 kg (190 lb 7.6 oz)   01/04/18 86 kg (189 lb 9.5 oz)   11/28/17 87.7 kg (193 lb 4.8 oz)   11/14/17 89.9 kg (198 lb 4.8 oz)   10/31/17 87.7 kg (193 lb 6.4 oz)   07/31/17 88 kg (194 lb)   06/13/17 87.6 kg (193 lb 1.6 oz)   05/22/17 86.1 kg (189 lb 13.1 oz)   04/27/17 90.6 kg (199 lb 11.2 oz)   04/06/17 91.9 kg (202 lb 9.6 oz)   03/10/17 89.9 kg (198 lb 3.2 oz)   12/09/16 92.1 kg (203 lb 1.6 oz)   11/15/16 94.6 kg (208 lb 9.6 oz)   10/21/16 89.4 kg (197 lb)   07/21/16 92.1 kg (203 lb)   05/17/16 91.2 kg (201 lb)   04/21/16 92.1 kg (203 lb)   01/19/16 92.1 kg (203 lb)   10/19/15 92.3 kg (203 lb 8 oz)   10/05/15 95.2 kg (209 lb 12.8 oz)   09/18/15 94.8 kg (209 lb)   08/14/15 98 kg (216 lb)   05/07/15 96.9 kg (213 lb 9.6 oz)   04/02/15 96.6 kg (213 lb) 01/16/15 101.1 kg (222 lb 12.8 oz)   11/11/14 100.2 kg (221 lb)   10/03/14 102.5 kg (226 lb)       Anthropometrics:   Height: 5' 10\" (177.8 cm) Weight: 86.4 kg (190 lb 7.6 oz)   IBW (%IBW):   ( ) UBW (%UBW):   (  %)   Last Weight Metrics:  Weight Loss Metrics 1/5/2018 1/4/2018 11/28/2017 11/14/2017 10/31/2017 7/31/2017 6/13/2017   Today's Wt 190 lb 7.6 oz 189 lb 9.5 oz 193 lb 4.8 oz 198 lb 4.8 oz 193 lb 6.4 oz 194 lb 193 lb 1.6 oz   BMI 27.33 kg/m2 27.2 kg/m2 27.74 kg/m2 28.45 kg/m2 27.75 kg/m2 27.84 kg/m2 27.71 kg/m2       BMI: Body mass index is 27.33 kg/(m^2). This BMI is indicative of:   []Underweight    []Normal    [x]Overweight    [] Obesity   [] Extreme Obesity (BMI>40)     Estimated Nutrition Needs (Based on):   2000 Kcals/day (BMR: 1540 x 1.3) , 70 g (0.8 g/kg) Protein  Carbohydrate: At Least 130 g/day  Fluids: 2000 mL/day (1ml/kcal) or per primary team    NUTRITION DIAGNOSES:   Problem:  No nutritional diagnosis at this time      Etiology: related to       Signs/Symptoms: as evidenced by        NUTRITION INTERVENTIONS:  Meals/Snacks: General/healthful diet                  GOAL:   consume >75% of meals in 5-7 days    LEARNING NEEDS (Diet, Food/Nutrient-Drug Interaction):    [x] None Identified   [] Identified and Education Provided/Documented   [] Identified and Pt declined/was not appropriate     Cultureal, Mormonism, OR Ethnic Dietary Needs:    [x] None Identified   [] Identified and Addressed     [x] Interdisciplinary Care Plan Reviewed/Documented    [x] Discharge Planning:   General healthy diet    MONITORING /EVALUATION:      Food/Nutrient Intake Outcomes:  Total energy intake  Physical Signs/Symptoms Outcomes: Weight/weight change, Electrolyte and renal profile, GI    NUTRITION RISK:    [] High              [] Moderate           []  Low  [x]  Minimal/Uncompromised    PT SEEN FOR:    []  MD Consult: []Calorie Count      []Diabetic Diet Education        []Diet Education     []Electrolyte Management     []General Nutrition Management and Supplements     []Management of Tube Feeding     []TPN Recommendations    []  RN Referral:  []MST score >=2     []Enteral/Parenteral Nutrition PTA     []Pregnant: Gestational DM or Multigestation     []Pressure Ulcer/Wound Care needs        []  Low BMI  [x]  LOS Referral       Nicolasa Schilder, RDN  Pager [phone]  Weekend Pager [phone]

## 2018-01-12 NOTE — PROGRESS NOTES
Oncology Nursing Communication Tool  7:27 AM  1/12/2018     Bedside shift change report given to Marcelle Gitelman, RN (incoming nurse) by Rakesh Nath RN (outgoing nurse) on [facility]. Report included the following information SBAR, Kardex, Intake/Output, MAR, Accordion, Recent Results and Med Rec Status. Shift Summary: No changes      Issues for physician to address: None         Oncology Shift Note   Admission Date 1/5/2018   Admission Diagnosis Sickle cell pain crisis ([facility] [address]   Code Status Full Code   Consults IP CONSULT TO HOSPITALIST      Cardiac Monitoring [] Yes [] No      Purposeful Hourly Rounding [] Yes    Mukund Score Total Score: 2   Mukund score 3 or > [] Bed Alarm [] Avasys [] 1:1 sitter [] Patient refused (Place signed refusal form in chart)      Pain Managed [] Yes [] No    Key Pain Meds             HYDROcodone-acetaminophen (NORCO)  mg tablet  (Taking) Take 1 Tab by mouth every six (6) hours as needed for Pain. Max Daily Amount: 4 Tabs. Influenza Vaccine Received Flu Vaccine for Current Season (usually Sept-March): Yes           Oxygen needs? [] Room air Oxygen @  []1L    []2L    []3L   []4L    []5L   []6L     Use home O2? [] Yes [] No  Perform O2 challenge test using  smartphrase (.oxygenchallenge)      Last bowel movement Last Bowel Movement Date: 01/03/18  bowel movement      Urinary Catheter             LDAs               Peripheral IV 90/54/18 Left Cephalic (Active)   Site Assessment Clean, dry, & intact 1/12/2018  3:26 AM   Phlebitis Assessment 0 1/12/2018  3:26 AM   Infiltration Assessment 0 1/12/2018  3:26 AM   Dressing Status Clean, dry, & intact 1/12/2018  3:26 AM   Dressing Type Tape;Transparent 1/12/2018  3:26 AM   Hub Color/Line Status Pink; Infusing;Flushed;Patent 1/12/2018  3:26 AM   Action Taken Other (comment) 1/8/2018  6:02 PM                         Readmission Risk Assessment Tool Score Medium Risk            17       Total Score        3 Has Seen PCP in Last 6 Months (Yes=3, No=0)    3 Patient Length of Stay (>5 days = 3)    4 IP Visits Last 12 Months (1-3=4, 4=9, >4=11)    5 Pt. Coverage (Medicare=5 , Medicaid, or Self-Pay=4)    2 Charlson Comorbidity Score (Age + Comorbid Conditions)        Criteria that do not apply:    . Living with Significant Other. Assisted Living. LTAC. SNF.  or   Rehab       Expected Length of Stay 2d 19h   Actual Length of Stay [address] RN

## 2018-01-12 NOTE — INTERDISCIPLINARY ROUNDS
Oncology Interdisciplinary rounds were held today to discuss patient plan of care and outcomes. The following members were present: Nursing and Case Management. ALOS: 7  DRG GLOS: 2.8    Plan for Day Mobility Plan for Stay Discharge Disposition   Pain medicine   zofran for nausea  Continue pulse oz monitoring  Up walking in hallway  Up for meals       Continue current tx  Pain control  Home Monday? ?

## 2018-01-13 LAB
ALBUMIN SERPL-MCNC: 3.2 G/DL (ref 3.5–5)
ALBUMIN/GLOB SERPL: 0.7 {RATIO} (ref 1.1–2.2)
ALP SERPL-CCNC: 87 U/L (ref 45–117)
ALT SERPL-CCNC: 15 U/L (ref 12–78)
ANION GAP SERPL CALC-SCNC: 4 MMOL/L (ref 5–15)
AST SERPL-CCNC: 37 U/L (ref 15–37)
BASOPHILS # BLD: 0 K/UL
BASOPHILS NFR BLD: 0 %
BILIRUB SERPL-MCNC: 1.4 MG/DL (ref 0.2–1)
BLASTS NFR BLD MANUAL: 0 %
BUN SERPL-MCNC: 6 MG/DL (ref 6–20)
BUN/CREAT SERPL: 8 (ref 12–20)
CALCIUM SERPL-MCNC: 8.2 MG/DL (ref 8.5–10.1)
CHLORIDE SERPL-SCNC: 103 MMOL/L (ref 97–108)
CO2 SERPL-SCNC: 31 MMOL/L (ref 21–32)
CREAT SERPL-MCNC: 0.8 MG/DL (ref 0.55–1.02)
DIFFERENTIAL METHOD BLD: ABNORMAL
EOSINOPHIL # BLD: 0.4 K/UL
EOSINOPHIL NFR BLD: 5 %
ERYTHROCYTE [DISTWIDTH] IN BLOOD BY AUTOMATED COUNT: 16.1 % (ref 11.5–14.5)
GLOBULIN SER CALC-MCNC: 4.6 G/DL (ref 2–4)
GLUCOSE SERPL-MCNC: 68 MG/DL (ref 65–100)
HCT VFR BLD AUTO: 19.5 % (ref 35–47)
HGB BLD-MCNC: 6.5 G/DL (ref 11.5–16)
LYMPHOCYTES # BLD: 3.4 K/UL
LYMPHOCYTES NFR BLD: 43 %
MANUAL DIFFERENTIAL PERFORMED BLD QL: ABNORMAL
MCH RBC QN AUTO: 31.9 PG (ref 26–34)
MCHC RBC AUTO-ENTMCNC: 33.3 G/DL (ref 30–36.5)
MCV RBC AUTO: 95.6 FL (ref 80–99)
METAMYELOCYTES NFR BLD MANUAL: 0 %
MONOCYTES # BLD: 1.4 K/UL
MONOCYTES NFR BLD: 17 %
MYELOCYTES NFR BLD MANUAL: 0 %
NEUTS BAND NFR BLD MANUAL: 0 %
NEUTS SEG # BLD: 2.8 K/UL
NEUTS SEG NFR BLD: 35 %
NRBC # BLD: 0.06 K/UL (ref 0–0.01)
NRBC BLD-RTO: 0.8 PER 100 WBC
OTHER CELLS NFR BLD MANUAL: 0 %
PLATELET # BLD AUTO: 168 K/UL (ref 150–400)
PLATELET COMMENTS,PCOM: ABNORMAL
POTASSIUM SERPL-SCNC: 3.7 MMOL/L (ref 3.5–5.1)
PROMYELOCYTES NFR BLD MANUAL: 0 %
PROT SERPL-MCNC: 7.8 G/DL (ref 6.4–8.2)
RBC # BLD AUTO: 2.04 M/UL (ref 3.8–5.2)
RBC MORPH BLD: ABNORMAL
SODIUM SERPL-SCNC: 138 MMOL/L (ref 136–145)
WBC # BLD AUTO: 8 K/UL (ref 3.6–11)
WBC NRBC COR # BLD: ABNORMAL 10*3/UL

## 2018-01-13 PROCEDURE — 74011250636 HC RX REV CODE- 250/636: Performed by: INTERNAL MEDICINE

## 2018-01-13 PROCEDURE — 74011250637 HC RX REV CODE- 250/637: Performed by: INTERNAL MEDICINE

## 2018-01-13 PROCEDURE — 65660000000 HC RM CCU STEPDOWN

## 2018-01-13 PROCEDURE — 36415 COLL VENOUS BLD VENIPUNCTURE: CPT | Performed by: INTERNAL MEDICINE

## 2018-01-13 PROCEDURE — 85027 COMPLETE CBC AUTOMATED: CPT | Performed by: INTERNAL MEDICINE

## 2018-01-13 PROCEDURE — 74011000258 HC RX REV CODE- 258: Performed by: INTERNAL MEDICINE

## 2018-01-13 PROCEDURE — 80053 COMPREHEN METABOLIC PANEL: CPT | Performed by: INTERNAL MEDICINE

## 2018-01-13 PROCEDURE — 77010033678 HC OXYGEN DAILY

## 2018-01-13 RX ORDER — FENTANYL 75 UG/H
1 PATCH TRANSDERMAL
Status: DISCONTINUED | OUTPATIENT
Start: 2018-01-13 | End: 2018-01-16 | Stop reason: HOSPADM

## 2018-01-13 RX ORDER — HYDROMORPHONE HYDROCHLORIDE 2 MG/ML
1 INJECTION, SOLUTION INTRAMUSCULAR; INTRAVENOUS; SUBCUTANEOUS
Status: DISCONTINUED | OUTPATIENT
Start: 2018-01-13 | End: 2018-01-14

## 2018-01-13 RX ADMIN — DEXTROSE MONOHYDRATE AND SODIUM CHLORIDE: 5; .9 INJECTION, SOLUTION INTRAVENOUS at 21:19

## 2018-01-13 RX ADMIN — DIPHENHYDRAMINE HYDROCHLORIDE 25 MG: 50 INJECTION, SOLUTION INTRAMUSCULAR; INTRAVENOUS at 09:52

## 2018-01-13 RX ADMIN — POLYETHYLENE GLYCOL 3350 17 G: 17 POWDER, FOR SOLUTION ORAL at 09:53

## 2018-01-13 RX ADMIN — DIPHENHYDRAMINE HYDROCHLORIDE 25 MG: 50 INJECTION, SOLUTION INTRAMUSCULAR; INTRAVENOUS at 01:36

## 2018-01-13 RX ADMIN — ONDANSETRON HYDROCHLORIDE 4 MG: 2 INJECTION, SOLUTION INTRAMUSCULAR; INTRAVENOUS at 06:08

## 2018-01-13 RX ADMIN — VALSARTAN 40 MG: 40 TABLET ORAL at 09:52

## 2018-01-13 RX ADMIN — DIPHENHYDRAMINE HYDROCHLORIDE 25 MG: 50 INJECTION, SOLUTION INTRAMUSCULAR; INTRAVENOUS at 06:09

## 2018-01-13 RX ADMIN — ONDANSETRON HYDROCHLORIDE 4 MG: 2 INJECTION, SOLUTION INTRAMUSCULAR; INTRAVENOUS at 01:36

## 2018-01-13 RX ADMIN — HYDROMORPHONE HYDROCHLORIDE 1 MG: 2 INJECTION INTRAMUSCULAR; INTRAVENOUS; SUBCUTANEOUS at 06:08

## 2018-01-13 RX ADMIN — HYDROMORPHONE HYDROCHLORIDE 1 MG: 2 INJECTION INTRAMUSCULAR; INTRAVENOUS; SUBCUTANEOUS at 16:05

## 2018-01-13 RX ADMIN — HEPARIN SODIUM 5000 UNITS: 5000 INJECTION, SOLUTION INTRAVENOUS; SUBCUTANEOUS at 01:35

## 2018-01-13 RX ADMIN — DEXTROSE MONOHYDRATE AND SODIUM CHLORIDE: 5; .9 INJECTION, SOLUTION INTRAVENOUS at 06:07

## 2018-01-13 RX ADMIN — ONDANSETRON HYDROCHLORIDE 4 MG: 2 INJECTION, SOLUTION INTRAMUSCULAR; INTRAVENOUS at 21:12

## 2018-01-13 RX ADMIN — CITALOPRAM HYDROBROMIDE 10 MG: 20 TABLET ORAL at 21:13

## 2018-01-13 RX ADMIN — ONDANSETRON HYDROCHLORIDE 4 MG: 2 INJECTION, SOLUTION INTRAMUSCULAR; INTRAVENOUS at 16:12

## 2018-01-13 RX ADMIN — ONDANSETRON HYDROCHLORIDE 4 MG: 2 INJECTION, SOLUTION INTRAMUSCULAR; INTRAVENOUS at 09:52

## 2018-01-13 RX ADMIN — DIPHENHYDRAMINE HYDROCHLORIDE 25 MG: 50 INJECTION, SOLUTION INTRAMUSCULAR; INTRAVENOUS at 16:05

## 2018-01-13 RX ADMIN — PANTOPRAZOLE SODIUM 40 MG: 40 TABLET, DELAYED RELEASE ORAL at 09:52

## 2018-01-13 RX ADMIN — HYDROMORPHONE HYDROCHLORIDE 1 MG: 2 INJECTION INTRAMUSCULAR; INTRAVENOUS; SUBCUTANEOUS at 09:52

## 2018-01-13 RX ADMIN — DIPHENHYDRAMINE HYDROCHLORIDE 25 MG: 50 INJECTION, SOLUTION INTRAMUSCULAR; INTRAVENOUS at 21:08

## 2018-01-13 RX ADMIN — FOLIC ACID 1 MG: 1 TABLET ORAL at 09:52

## 2018-01-13 RX ADMIN — HEPARIN SODIUM 5000 UNITS: 5000 INJECTION, SOLUTION INTRAVENOUS; SUBCUTANEOUS at 12:45

## 2018-01-13 RX ADMIN — HYDROMORPHONE HYDROCHLORIDE 1 MG: 2 INJECTION INTRAMUSCULAR; INTRAVENOUS; SUBCUTANEOUS at 01:35

## 2018-01-13 RX ADMIN — HYDROMORPHONE HYDROCHLORIDE 1 MG: 2 INJECTION INTRAMUSCULAR; INTRAVENOUS; SUBCUTANEOUS at 21:07

## 2018-01-13 NOTE — PROGRESS NOTES
Oncology Nursing Communication Tool  8:09 AM  1/13/2018     Bedside shift change report given to Kaila Viera RN (incoming nurse) by Em Gill RN (outgoing nurse) on [facility]. Report included the following information SBAR, Kardex, Intake/Output, Accordion, Recent Results and Med Rec Status. Shift Summary: No changes      Issues for physician to address: None         Oncology Shift Note   Admission Date 1/5/2018   Admission Diagnosis Sickle cell pain crisis ([facility] [address]   Code Status Full Code   Consults IP CONSULT TO HOSPITALIST      Cardiac Monitoring [] Yes [] No      Purposeful Hourly Rounding [] Yes    Mukund Score Total Score: 2   Mukund score 3 or > [] Bed Alarm [] Avasys [] 1:1 sitter [] Patient refused (Place signed refusal form in chart)      Pain Managed [] Yes [] No    Key Pain Meds             HYDROcodone-acetaminophen (NORCO)  mg tablet  (Taking) Take 1 Tab by mouth every six (6) hours as needed for Pain. Max Daily Amount: 4 Tabs. Influenza Vaccine Received Flu Vaccine for Current Season (usually Sept-March): Yes           Oxygen needs? [] Room air Oxygen @  []1L    []2L    []3L   []4L    []5L   []6L     Use home O2? [] Yes [] No  Perform O2 challenge test using  smartphrase (.oxygenchallenge)      Last bowel movement Last Bowel Movement Date: 01/03/18  bowel movement      Urinary Catheter             LDAs               Peripheral IV 45/65/61 Left Cephalic (Active)   Site Assessment Clean, dry, & intact 1/13/2018  3:46 AM   Phlebitis Assessment 0 1/13/2018  3:46 AM   Infiltration Assessment 0 1/13/2018  3:46 AM   Dressing Status Clean, dry, & intact 1/13/2018  3:46 AM   Dressing Type Tape;Transparent 1/13/2018  3:46 AM   Hub Color/Line Status Pink; Infusing;Flushed;Patent 1/13/2018  3:46 AM   Action Taken Other (comment) 1/8/2018  6:02 PM                         Readmission Risk Assessment Tool Score Medium Risk            17       Total Score        3 Has Seen PCP in Last 6 Months (Yes=3, No=0)    3 Patient Length of Stay (>5 days = 3)    4 IP Visits Last 12 Months (1-3=4, 4=9, >4=11)    5 Pt. Coverage (Medicare=5 , Medicaid, or Self-Pay=4)    2 Charlson Comorbidity Score (Age + Comorbid Conditions)        Criteria that do not apply:    . Living with Significant Other. Assisted Living. LTAC. SNF.  or   Rehab       Expected Length of Stay 2d 19h   Actual Length of Stay [address], RN

## 2018-01-13 NOTE — PROGRESS NOTES
1213-Nigh shift RN unable to get Lab work, Dayshift RN attempted X 2 unable to obtain. MD paged to ask for arterial stick for today's lab work. Patient says this is a common occurrence for her. 1218-MD refused arterial stick. Would like PICC team located or supervisor located and they are to perform Lab draw. MD was informed that PICC was to be avoided if PICC team will draw labs when RN unable. Halie notified, supervisor provided PICC number. PICC team member on way to perform Lab draw. PICC team will not be here on Sunday but they are here today and will perform Lab work. 1315- PICC team called again. Patient is still on the list of people to be seen in approximately 20 to 30 minutes.

## 2018-01-13 NOTE — PROGRESS NOTES
General Daily Progress Note    Admit Date: 1/5/2018    Subjective:     Patient's pain continues to improve. Current Facility-Administered Medications   Medication Dose Route Frequency    HYDROmorphone (DILAUDID) injection 1 mg  1 mg IntraVENous Q6H PRN    fentaNYL (DURAGESIC) 75 mcg/hr patch 1 Patch  1 Patch TransDERmal Q72H    dextrose 5% and 0.9% NaCl 1,000 mL with potassium chloride 10 mEq infusion   IntraVENous CONTINUOUS    polyethylene glycol (MIRALAX) packet 17 g  17 g Oral DAILY    magnesium hydroxide (MILK OF MAGNESIA) 400 mg/5 mL oral suspension 30 mL  30 mL Oral DAILY PRN    diphenhydrAMINE (BENADRYL) injection 25 mg  25 mg IntraVENous Q3H PRN    citalopram (CELEXA) tablet 10 mg  10 mg Oral QHS    folic acid (FOLVITE) tablet 1 mg  1 mg Oral DAILY    pantoprazole (PROTONIX) tablet 40 mg  40 mg Oral ACB    valsartan (DIOVAN) tablet 40 mg  40 mg Oral DAILY    acetaminophen (TYLENOL) tablet 650 mg  650 mg Oral Q4H PRN    ondansetron (ZOFRAN) injection 4 mg  4 mg IntraVENous Q4H PRN    heparin (porcine) injection 5,000 Units  5,000 Units SubCUTAneous Q12H        Review of Systems  A comprehensive review of systems was negative.     Objective:     Patient Vitals for the past 24 hrs:   BP Temp Pulse Resp SpO2   01/13/18 0853 137/80 - - 16 -   01/13/18 0849 - 99 °F (37.2 °C) 88 - 97 %   01/13/18 0350 122/78 98.7 °F (37.1 °C) 90 16 95 %   01/12/18 2328 123/71 99.2 °F (37.3 °C) 91 16 94 %   01/12/18 1947 122/73 99.2 °F (37.3 °C) 93 16 96 %   01/12/18 1524 129/69 99 °F (37.2 °C) 98 16 96 %   01/12/18 1114 107/59 99.1 °F (37.3 °C) 96 16 96 %     01/13 0701 - 01/13 1900  In: 360 [P.O.:360]  Out: -   01/11 1901 - 01/13 0700  In: 360 [P.O.:360]  Out: -     Physical Exam:   Visit Vitals    /80    Pulse 88    Temp 99 °F (37.2 °C)    Resp 16    Ht 5' 10\" (1.778 m)    Wt 190 lb 7.6 oz (86.4 kg)    SpO2 97%    BMI 27.33 kg/m2     General appearance: alert, cooperative, no distress, appears stated age  Neck: supple, symmetrical, trachea midline, no adenopathy, thyroid: not enlarged, symmetric, no tenderness/mass/nodules, no carotid bruit and no JVD  Lungs: clear to auscultation bilaterally  Heart: regular rate and rhythm, S1, S2 normal, no murmur, click, rub or gallop  Abdomen: soft, non-tender. Bowel sounds normal. No masses,  no organomegaly  Extremities: extremities normal, atraumatic, no cyanosis or edema        Data Review No results found for this or any previous visit (from the past 24 hour(s)). Assessment:     Principal Problem:    Sickle cell pain crisis ([facility] [address] (1/5/2018)    Active Problems:    Venous insufficiency (10/3/2014)        Plan:     1. Continue reducing analgesics. If all goes well anticipate discharge on Monday. 2.  Patient has a low-grade fever without an obvious cause. Await results of CBC and no intervention for now. 3.  Will mobilize.

## 2018-01-13 NOTE — PROGRESS NOTES
Oncology Nursing Communication Tool  8:12 PM  1/12/2018     Bedside shift change report given to Jama Melton RN (incoming nurse) by Mary Anne Arguello RN (outgoing nurse) on Myke Powers. Report included the following information SBAR and Kardex. Shift Summary: pain medication Q4H      Issues for physician to address: none         Oncology Shift Note   Admission Date 1/5/2018   Admission Diagnosis Sickle cell pain crisis ([address]   Code Status Full Code   Consults IP CONSULT TO HOSPITALIST      Cardiac Monitoring [] Yes [] No      Purposeful Hourly Rounding [] Yes    Mukund Score Total Score: 2   Mukund score 3 or > [] Bed Alarm [] Avasys [] 1:1 sitter [] Patient refused (Place signed refusal form in chart)      Pain Managed [] Yes [] No    Key Pain Meds             HYDROcodone-acetaminophen (NORCO)  mg tablet  (Taking) Take 1 Tab by mouth every six (6) hours as needed for Pain. Max Daily Amount: 4 Tabs. Influenza Vaccine Received Flu Vaccine for Current Season (usually Sept-March): Yes           Oxygen needs? [] Room air Oxygen @  []1L    []2L    []3L   []4L    []5L   []6L     Use home O2? [] Yes [] No  Perform O2 challenge test using  smartphrase (.oxygenchallenge)      Last bowel movement Last Bowel Movement Date: 01/03/18  bowel movement      Urinary Catheter             LDAs               Peripheral IV 32/42/62 Left Cephalic (Active)   Site Assessment Clean, dry, & intact 1/12/2018  7:09 PM   Phlebitis Assessment 0 1/12/2018  3:24 PM   Infiltration Assessment 0 1/12/2018  3:24 PM   Dressing Status Clean, dry, & intact 1/12/2018  3:24 PM   Dressing Type Transparent 1/12/2018  3:24 PM   Hub Color/Line Status Pink; Infusing 1/12/2018  3:24 PM   Action Taken Other (comment) 1/8/2018  6:02 PM                         Readmission Risk Assessment Tool Score Medium Risk            17       Total Score        3 Has Seen PCP in Last 6 Months (Yes=3, No=0)    3 Patient Length of Stay (>5 days = 3)    4 IP Visits Last 12 Months (1-3=4, 4=9, >4=11)    5 Pt. Coverage (Medicare=5 , Medicaid, or Self-Pay=4)    2 Charlson Comorbidity Score (Age + Comorbid Conditions)        Criteria that do not apply:    . Living with Significant Other. Assisted Living. LTAC. SNF.  or   Rehab       Expected Length of Stay 2d 19h   Actual Length of Stay 601 Eugenio Lynne RN

## 2018-01-14 PROCEDURE — 74011250637 HC RX REV CODE- 250/637: Performed by: INTERNAL MEDICINE

## 2018-01-14 PROCEDURE — 74011000258 HC RX REV CODE- 258: Performed by: INTERNAL MEDICINE

## 2018-01-14 PROCEDURE — 74011250636 HC RX REV CODE- 250/636: Performed by: INTERNAL MEDICINE

## 2018-01-14 PROCEDURE — 65660000000 HC RM CCU STEPDOWN

## 2018-01-14 RX ORDER — HYDROMORPHONE HYDROCHLORIDE 2 MG/ML
1 INJECTION, SOLUTION INTRAMUSCULAR; INTRAVENOUS; SUBCUTANEOUS
Status: COMPLETED | OUTPATIENT
Start: 2018-01-14 | End: 2018-01-14

## 2018-01-14 RX ORDER — HYDROCODONE BITARTRATE AND ACETAMINOPHEN 5; 325 MG/1; MG/1
2 TABLET ORAL
Status: DISCONTINUED | OUTPATIENT
Start: 2018-01-14 | End: 2018-01-16 | Stop reason: HOSPADM

## 2018-01-14 RX ADMIN — DIPHENHYDRAMINE HYDROCHLORIDE 25 MG: 50 INJECTION, SOLUTION INTRAMUSCULAR; INTRAVENOUS at 03:25

## 2018-01-14 RX ADMIN — FOLIC ACID 1 MG: 1 TABLET ORAL at 09:09

## 2018-01-14 RX ADMIN — DIPHENHYDRAMINE HYDROCHLORIDE 25 MG: 50 INJECTION, SOLUTION INTRAMUSCULAR; INTRAVENOUS at 15:52

## 2018-01-14 RX ADMIN — ONDANSETRON HYDROCHLORIDE 4 MG: 2 INJECTION, SOLUTION INTRAMUSCULAR; INTRAVENOUS at 03:25

## 2018-01-14 RX ADMIN — ONDANSETRON HYDROCHLORIDE 4 MG: 2 INJECTION, SOLUTION INTRAMUSCULAR; INTRAVENOUS at 15:50

## 2018-01-14 RX ADMIN — PANTOPRAZOLE SODIUM 40 MG: 40 TABLET, DELAYED RELEASE ORAL at 09:09

## 2018-01-14 RX ADMIN — HYDROMORPHONE HYDROCHLORIDE 1 MG: 2 INJECTION INTRAMUSCULAR; INTRAVENOUS; SUBCUTANEOUS at 09:27

## 2018-01-14 RX ADMIN — DIPHENHYDRAMINE HYDROCHLORIDE 25 MG: 50 INJECTION, SOLUTION INTRAMUSCULAR; INTRAVENOUS at 21:19

## 2018-01-14 RX ADMIN — VALSARTAN 40 MG: 40 TABLET ORAL at 09:09

## 2018-01-14 RX ADMIN — HEPARIN SODIUM 5000 UNITS: 5000 INJECTION, SOLUTION INTRAVENOUS; SUBCUTANEOUS at 23:48

## 2018-01-14 RX ADMIN — HEPARIN SODIUM 5000 UNITS: 5000 INJECTION, SOLUTION INTRAVENOUS; SUBCUTANEOUS at 00:53

## 2018-01-14 RX ADMIN — DIPHENHYDRAMINE HYDROCHLORIDE 25 MG: 50 INJECTION, SOLUTION INTRAMUSCULAR; INTRAVENOUS at 09:25

## 2018-01-14 RX ADMIN — ONDANSETRON HYDROCHLORIDE 4 MG: 2 INJECTION, SOLUTION INTRAMUSCULAR; INTRAVENOUS at 21:20

## 2018-01-14 RX ADMIN — HYDROMORPHONE HYDROCHLORIDE 1 MG: 2 INJECTION INTRAMUSCULAR; INTRAVENOUS; SUBCUTANEOUS at 03:25

## 2018-01-14 RX ADMIN — HYDROCODONE BITARTRATE AND ACETAMINOPHEN 2 TABLET: 5; 325 TABLET ORAL at 21:17

## 2018-01-14 RX ADMIN — ONDANSETRON HYDROCHLORIDE 4 MG: 2 INJECTION, SOLUTION INTRAMUSCULAR; INTRAVENOUS at 09:25

## 2018-01-14 RX ADMIN — POLYETHYLENE GLYCOL 3350 17 G: 17 POWDER, FOR SOLUTION ORAL at 09:23

## 2018-01-14 RX ADMIN — HEPARIN SODIUM 5000 UNITS: 5000 INJECTION, SOLUTION INTRAVENOUS; SUBCUTANEOUS at 13:34

## 2018-01-14 RX ADMIN — DEXTROSE MONOHYDRATE AND SODIUM CHLORIDE: 5; .9 INJECTION, SOLUTION INTRAVENOUS at 10:50

## 2018-01-14 RX ADMIN — HYDROMORPHONE HYDROCHLORIDE 1 MG: 2 INJECTION INTRAMUSCULAR; INTRAVENOUS; SUBCUTANEOUS at 15:54

## 2018-01-14 RX ADMIN — CITALOPRAM HYDROBROMIDE 10 MG: 20 TABLET ORAL at 21:18

## 2018-01-14 NOTE — PROGRESS NOTES
Bedside and Verbal shift change report given to [address] (oncoming nurse) by Maxx Tommy RN (offgoing nurse). Report included the following information SBAR, Kardex, Intake/Output, MAR, Recent Results and Cardiac Rhythm NSR  Pt awake and watching tv. No noted acute distress. Denies complaints. Instr pt next available Dilaudid IV PRN for pain is avail around 3am. Instr pt to call if needs pain med. Will monitor.

## 2018-01-14 NOTE — PROGRESS NOTES
0715 Bedside and Verbal shift change report given to Alka Alarcon RN (oncoming nurse) by Pk Nur RN (offgoing nurse). Report included the following information SBAR, Kardex, [facility], Recent Results and Cardiac Rhythm NSR.     1530 Bedside and Verbal shift change report given to Toña Deluca (oncoming nurse) by Alka Alarcon RN (offgoing nurse). Report included the following information SBAR, Kardex, MAR and Cardiac Rhythm NSR 80s.

## 2018-01-14 NOTE — PROGRESS NOTES
Pt's IV (that PICC team placed earlier today) infiltrated. RN attempted x 2 to get an IV on pt. Unsuccessful. Pt is a difficult stick.

## 2018-01-14 NOTE — PROGRESS NOTES
General Daily Progress Note    Admit Date: 1/5/2018    Subjective:     Patient's pain continues to improve. Current Facility-Administered Medications   Medication Dose Route Frequency    HYDROcodone-acetaminophen (NORCO) 5-325 mg per tablet 2 Tab  2 Tab Oral Q6H PRN    dextrose 5% and 0.9% NaCl 1,000 mL with potassium chloride 10 mEq infusion   IntraVENous CONTINUOUS    HYDROmorphone (DILAUDID) injection 1 mg  1 mg IntraVENous Q6H PRN    fentaNYL (DURAGESIC) 75 mcg/hr patch 1 Patch  1 Patch TransDERmal Q72H    polyethylene glycol (MIRALAX) packet 17 g  17 g Oral DAILY    magnesium hydroxide (MILK OF MAGNESIA) 400 mg/5 mL oral suspension 30 mL  30 mL Oral DAILY PRN    diphenhydrAMINE (BENADRYL) injection 25 mg  25 mg IntraVENous Q3H PRN    citalopram (CELEXA) tablet 10 mg  10 mg Oral QHS    folic acid (FOLVITE) tablet 1 mg  1 mg Oral DAILY    pantoprazole (PROTONIX) tablet 40 mg  40 mg Oral ACB    valsartan (DIOVAN) tablet 40 mg  40 mg Oral DAILY    acetaminophen (TYLENOL) tablet 650 mg  650 mg Oral Q4H PRN    ondansetron (ZOFRAN) injection 4 mg  4 mg IntraVENous Q4H PRN    heparin (porcine) injection 5,000 Units  5,000 Units SubCUTAneous Q12H        Review of Systems  A comprehensive review of systems was negative.     Objective:     Patient Vitals for the past 24 hrs:   BP Temp Pulse Resp SpO2   01/14/18 0754 130/63 98.2 °F (36.8 °C) 85 20 96 %   01/14/18 0309 144/69 98.8 °F (37.1 °C) 95 18 97 %   01/13/18 2344 123/64 98.6 °F (37 °C) 88 20 96 %   01/13/18 2015 136/88 99.8 °F (37.7 °C) 96 20 100 %   01/13/18 1710 130/74 99.1 °F (37.3 °C) 63 20 96 %   01/13/18 1119 156/83 98.7 °F (37.1 °C) 96 16 100 %        01/12 1901 - 01/14 0700  In: 4747.5 [P.O.:360; I.V.:4387.5]  Out: -     Physical Exam:   Visit Vitals    /63 (BP 1 Location: Left arm, BP Patient Position: At rest)    Pulse 85    Temp 98.2 °F (36.8 °C)    Resp 20    Ht 5' 10\" (1.778 m)    Wt 190 lb 7.6 oz (86.4 kg)    SpO2 96%    BMI 27.33 kg/m2     General appearance: alert, cooperative, no distress, appears stated age  Neck: supple, symmetrical, trachea midline, no adenopathy, thyroid: not enlarged, symmetric, no tenderness/mass/nodules, no carotid bruit and no JVD  Lungs: clear to auscultation bilaterally  Heart: regular rate and rhythm, S1, S2 normal, no murmur, click, rub or gallop  Abdomen: soft, non-tender. Bowel sounds normal. No masses,  no organomegaly  Extremities: extremities normal, atraumatic, no cyanosis or edema        Data Review   Recent Results (from the past 24 hour(s))   METABOLIC PANEL, COMPREHENSIVE    Collection Time: 01/13/18  1:51 PM   Result Value Ref Range    Sodium 138 136 - 145 mmol/L    Potassium 3.7 3.5 - 5.1 mmol/L    Chloride 103 97 - 108 mmol/L    CO2 31 21 - 32 mmol/L    Anion gap 4 (L) 5 - 15 mmol/L    Glucose 68 65 - 100 mg/dL    BUN 6 6 - 20 MG/DL    Creatinine 0.80 0.55 - 1.02 MG/DL    BUN/Creatinine ratio 8 (L) 12 - 20      GFR est AA >60 >60 ml/min/1.73m2    GFR est non-AA >60 >60 ml/min/1.73m2    Calcium 8.2 (L) 8.5 - 10.1 MG/DL    Bilirubin, total 1.4 (H) 0.2 - 1.0 MG/DL    ALT (SGPT) 15 12 - 78 U/L    AST (SGOT) 37 15 - 37 U/L    Alk. phosphatase 87 45 - 117 U/L    Protein, total 7.8 6.4 - 8.2 g/dL    Albumin 3.2 (L) 3.5 - 5.0 g/dL    Globulin 4.6 (H) 2.0 - 4.0 g/dL    A-G Ratio 0.7 (L) 1.1 - 2.2     CBC WITH MANUAL DIFF    Collection Time: 01/13/18  1:51 PM   Result Value Ref Range    WBC 8.0 3.6 - 11.0 K/uL    RBC 2.04 (L) 3.80 - 5.20 M/uL    HGB 6.5 (L) 11.5 - 16.0 g/dL    HCT 19.5 (L) 35.0 - 47.0 %    MCV 95.6 80.0 - 99.0 FL    MCH 31.9 26.0 - 34.0 PG    MCHC 33.3 30.0 - 36.5 g/dL    RDW 16.1 (H) 11.5 - 14.5 %    PLATELET 677 072 - 753 K/uL    NEUTROPHILS 35 %    BAND NEUTROPHILS 0 %    LYMPHOCYTES 43 %    MONOCYTES 17 %    EOSINOPHILS 5 %    BASOPHILS 0 %    METAMYELOCYTES 0 %    MYELOCYTES 0 %    PROMYELOCYTES 0 %    BLASTS 0 %    OTHER CELL 0      ABS. NEUTROPHILS 2.8 K/UL    ABS. LYMPHOCYTES 3.4 K/UL    ABS. MONOCYTES 1.4 K/UL    ABS. EOSINOPHILS 0.4 K/UL    ABS. BASOPHILS 0.0 K/UL    PLATELET COMMENTS LARGE PLATELETS      RBC COMMENTS SICKLE CELLS  PRESENT        RBC COMMENTS ANISOCYTOSIS  1+        RBC COMMENTS POLYCHROMASIA  PRESENT        RBC COMMENTS MICROCYTOSIS  PRESENT        DF MANUAL      NRBC 0.8 (H) 0  WBC    ABSOLUTE NRBC 0.06 (H) 0.00 - 0.01 K/uL    WBC CORRECTED FOR NR ADJUSTED FOR NUCLEATED RBC'S      DIFFERENTIAL MANUAL DIFFERENTIAL ORDERED             Assessment:     Principal Problem:    Sickle cell pain crisis ([facility] [address] (1/5/2018)    Active Problems:    Venous insufficiency (10/3/2014)        Plan:     1. Continue to wean analgesics. Crisis improving. No transfusions for now. 2.  Discharge tomorrow if all goes well. 3.  Continue to monitor low-grade fever.

## 2018-01-14 NOTE — PROGRESS NOTES
Problem: Falls - Risk of  Goal: *Absence of Falls  Document Mukund Fall Risk and appropriate interventions in the flowsheet. Outcome: Progressing Towards Goal  Fall Risk Interventions:            Medication Interventions: Evaluate medications/consider consulting pharmacy, Teach patient to arise slowly         History of Falls Interventions: Evaluate medications/consider consulting pharmacy        12:22 AM  Bedside shift change report given to Shashi Fine RN (oncoming nurse) by Katelin Gresham RN (offgoing nurse). Report given with SBAR, MAR, Recent Results, Vital Signs, and plan of care. Pt is alert and oriented x 4 . Call bell within reach of patient. Safety/fall precautions in place.

## 2018-01-14 NOTE — PROGRESS NOTES
Bedside and Verbal shift change report given to [facility] (oncoming nurse) by Lindsay Anderson RN (offgoing nurse). Report included the following information SBAR, Kardex, Intake/Output, MAR and Recent Results. IV Fluids D/C'd.

## 2018-01-15 PROCEDURE — 74011250636 HC RX REV CODE- 250/636: Performed by: INTERNAL MEDICINE

## 2018-01-15 PROCEDURE — 65660000000 HC RM CCU STEPDOWN

## 2018-01-15 PROCEDURE — 74011250637 HC RX REV CODE- 250/637: Performed by: INTERNAL MEDICINE

## 2018-01-15 RX ORDER — HYDROMORPHONE HYDROCHLORIDE 2 MG/ML
0.5 INJECTION, SOLUTION INTRAMUSCULAR; INTRAVENOUS; SUBCUTANEOUS
Status: DISCONTINUED | OUTPATIENT
Start: 2018-01-15 | End: 2018-01-16 | Stop reason: HOSPADM

## 2018-01-15 RX ADMIN — FOLIC ACID 1 MG: 1 TABLET ORAL at 09:52

## 2018-01-15 RX ADMIN — ONDANSETRON HYDROCHLORIDE 4 MG: 2 INJECTION, SOLUTION INTRAMUSCULAR; INTRAVENOUS at 11:32

## 2018-01-15 RX ADMIN — ONDANSETRON HYDROCHLORIDE 4 MG: 2 INJECTION, SOLUTION INTRAMUSCULAR; INTRAVENOUS at 17:30

## 2018-01-15 RX ADMIN — DIPHENHYDRAMINE HYDROCHLORIDE 25 MG: 50 INJECTION, SOLUTION INTRAMUSCULAR; INTRAVENOUS at 22:19

## 2018-01-15 RX ADMIN — HYDROCODONE BITARTRATE AND ACETAMINOPHEN 2 TABLET: 5; 325 TABLET ORAL at 11:30

## 2018-01-15 RX ADMIN — HYDROCODONE BITARTRATE AND ACETAMINOPHEN 2 TABLET: 5; 325 TABLET ORAL at 17:30

## 2018-01-15 RX ADMIN — HEPARIN SODIUM 5000 UNITS: 5000 INJECTION, SOLUTION INTRAVENOUS; SUBCUTANEOUS at 11:32

## 2018-01-15 RX ADMIN — ONDANSETRON HYDROCHLORIDE 4 MG: 2 INJECTION, SOLUTION INTRAMUSCULAR; INTRAVENOUS at 22:18

## 2018-01-15 RX ADMIN — HYDROMORPHONE HYDROCHLORIDE 0.5 MG: 2 INJECTION INTRAMUSCULAR; INTRAVENOUS; SUBCUTANEOUS at 22:12

## 2018-01-15 RX ADMIN — POLYETHYLENE GLYCOL 3350 17 G: 17 POWDER, FOR SOLUTION ORAL at 09:51

## 2018-01-15 RX ADMIN — DIPHENHYDRAMINE HYDROCHLORIDE 25 MG: 50 INJECTION, SOLUTION INTRAMUSCULAR; INTRAVENOUS at 17:29

## 2018-01-15 RX ADMIN — PANTOPRAZOLE SODIUM 40 MG: 40 TABLET, DELAYED RELEASE ORAL at 07:22

## 2018-01-15 RX ADMIN — CITALOPRAM HYDROBROMIDE 10 MG: 20 TABLET ORAL at 22:13

## 2018-01-15 RX ADMIN — VALSARTAN 40 MG: 40 TABLET ORAL at 09:51

## 2018-01-15 RX ADMIN — ONDANSETRON HYDROCHLORIDE 4 MG: 2 INJECTION, SOLUTION INTRAMUSCULAR; INTRAVENOUS at 04:02

## 2018-01-15 RX ADMIN — HYDROCODONE BITARTRATE AND ACETAMINOPHEN 2 TABLET: 5; 325 TABLET ORAL at 04:01

## 2018-01-15 RX ADMIN — DIPHENHYDRAMINE HYDROCHLORIDE 25 MG: 50 INJECTION, SOLUTION INTRAMUSCULAR; INTRAVENOUS at 11:32

## 2018-01-15 RX ADMIN — DIPHENHYDRAMINE HYDROCHLORIDE 25 MG: 50 INJECTION, SOLUTION INTRAMUSCULAR; INTRAVENOUS at 04:02

## 2018-01-15 NOTE — PROGRESS NOTES
Bedside and Verbal shift change report given to SAMIR Mcwilliams (oncoming nurse) by Funmilayo Russell RN (offgoing nurse). Report included the following information SBAR, Kardex, Intake/Output and MAR.

## 2018-01-15 NOTE — PROGRESS NOTES
Oncology Nursing Communication Tool  7:00 PM  1/15/2018     Bedside and Verbal shift change report given to Matthew Celeste RN (incoming nurse) by Jonny Quintero RN (outgoing nurse) on [facility]. Report included the following information SBAR, Kardex, Intake/Output, MAR and Recent Results. Shift Summary: Patient resting comfortably. Issues for physician to address: none         Oncology Shift Note   Admission Date 1/5/2018   Admission Diagnosis Sickle cell pain crisis ([address]   Code Status Full Code   Consults IP CONSULT TO HOSPITALIST      Cardiac Monitoring [x] Yes [] No      Purposeful Hourly Rounding [x] Yes    Mukund Score Total Score: 1   Mukund score 3 or > [] Bed Alarm [] Avasys [] 1:1 sitter [] Patient refused (Place signed refusal form in chart)      Pain Managed [x] Yes [] No    Key Pain Meds             HYDROcodone-acetaminophen (NORCO)  mg tablet  (Taking) Take 1 Tab by mouth every six (6) hours as needed for Pain. Max Daily Amount: 4 Tabs. Influenza Vaccine Received Flu Vaccine for Current Season (usually Sept-March): Yes           Oxygen needs? [x] Room air Oxygen @  []1L    []2L    []3L   []4L    []5L   []6L     Use home O2? [] Yes [x] No  Perform O2 challenge test using  smartphrase (.oxygenchallenge)      Last bowel movement Last Bowel Movement Date:  (unknown)  bowel movement      Urinary Catheter             LDAs               Peripheral IV 01/13/18 Right Antecubital (Active)   Site Assessment Clean, dry, & intact 1/15/2018  7:30 AM   Phlebitis Assessment 0 1/15/2018  7:30 AM   Infiltration Assessment 0 1/15/2018  7:30 AM   Dressing Status Clean, dry, & intact 1/15/2018  7:30 AM   Dressing Type Tape;Transparent 1/15/2018  7:30 AM   Hub Color/Line Status Pink; Infusing 1/15/2018  7:30 AM   Alcohol Cap Used Yes 1/14/2018  8:55 PM                         Readmission Risk Assessment Tool Score Medium Risk            17       Total Score        3 Has Seen PCP in Last 6 Months (Yes=3, No=0)    3 Patient Length of Stay (>5 days = 3)    4 IP Visits Last 12 Months (1-3=4, 4=9, >4=11)    5 Pt. Coverage (Medicare=5 , Medicaid, or Self-Pay=4)    2 Charlson Comorbidity Score (Age + Comorbid Conditions)        Criteria that do not apply:    . Living with Significant Other. Assisted Living. LTAC. SNF.  or   Rehab       Expected Length of Stay 2d 19h   Actual Length of Stay 10          Poppy Cervantes RN

## 2018-01-15 NOTE — PROGRESS NOTES
General Daily Progress Note    Admit Date: 1/5/2018    Subjective:     Patient has noted increase in pain. Current Facility-Administered Medications   Medication Dose Route Frequency    HYDROmorphone (PF) (DILAUDID) injection 0.5 mg  0.5 mg IntraVENous Q8H PRN    HYDROcodone-acetaminophen (NORCO) 5-325 mg per tablet 2 Tab  2 Tab Oral Q6H PRN    fentaNYL (DURAGESIC) 75 mcg/hr patch 1 Patch  1 Patch TransDERmal Q72H    polyethylene glycol (MIRALAX) packet 17 g  17 g Oral DAILY    magnesium hydroxide (MILK OF MAGNESIA) 400 mg/5 mL oral suspension 30 mL  30 mL Oral DAILY PRN    diphenhydrAMINE (BENADRYL) injection 25 mg  25 mg IntraVENous Q3H PRN    citalopram (CELEXA) tablet 10 mg  10 mg Oral QHS    folic acid (FOLVITE) tablet 1 mg  1 mg Oral DAILY    pantoprazole (PROTONIX) tablet 40 mg  40 mg Oral ACB    valsartan (DIOVAN) tablet 40 mg  40 mg Oral DAILY    acetaminophen (TYLENOL) tablet 650 mg  650 mg Oral Q4H PRN    ondansetron (ZOFRAN) injection 4 mg  4 mg IntraVENous Q4H PRN    heparin (porcine) injection 5,000 Units  5,000 Units SubCUTAneous Q12H        Review of Systems  A comprehensive review of systems was negative.     Objective:     Patient Vitals for the past 24 hrs:   BP Temp Pulse Resp SpO2   01/15/18 0814 136/85 98.4 °F (36.9 °C) 79 18 98 %   01/15/18 0402 118/64 98.1 °F (36.7 °C) 83 18 97 %   01/14/18 2342 121/57 98.6 °F (37 °C) 83 18 100 %   01/14/18 2011 121/58 99.1 °F (37.3 °C) 89 17 98 %   01/14/18 1555 140/82 98.7 °F (37.1 °C) 96 18 100 %   01/14/18 1544 - - 90 - 94 %   01/14/18 1115 128/64 98.8 °F (37.1 °C) 90 20 99 %        01/13 1901 - 01/15 0700  In: 1285 [P.O.:360; I.V.:925]  Out: -     Physical Exam:   Visit Vitals    /85 (BP 1 Location: Left arm, BP Patient Position: At rest)    Pulse 79    Temp 98.4 °F (36.9 °C)    Resp 18    Ht 5' 10\" (1.778 m)    Wt 190 lb 7.6 oz (86.4 kg)    SpO2 98%    BMI 27.33 kg/m2     General appearance: alert, cooperative, no distress, appears stated age  Neck: supple, symmetrical, trachea midline, no adenopathy, thyroid: not enlarged, symmetric, no tenderness/mass/nodules, no carotid bruit and no JVD  Lungs: clear to auscultation bilaterally  Heart: regular rate and rhythm, S1, S2 normal, no murmur, click, rub or gallop  Abdomen: soft, non-tender. Bowel sounds normal. No masses,  no organomegaly  Extremities: extremities normal, atraumatic, no cyanosis or edema        Data Review No results found for this or any previous visit (from the past 24 hour(s)). Assessment:     Principal Problem:    Sickle cell pain crisis ([facility] [address] (1/5/2018)    Active Problems:    Venous insufficiency (10/3/2014)        Plan:     1. Resume parenteral analgesics for now. Anticipate discharge tomorrow assuming no significant worsening. 2.  Will mobilize.

## 2018-01-15 NOTE — PROGRESS NOTES
Oncology Interdisciplinary rounds were held today to discuss patient plan of care and outcomes. The following members were present: Nursing and Case Management. ALOS: 10  DRG GLOS: 2.8    Plan for Day Mobility Plan for Stay Discharge Disposition   Continue current treatment plan Up ad erum Monitor pulse ox  Weaned off oxygen   Home when medically stable- tomorrow?

## 2018-01-15 NOTE — PROGRESS NOTES
Problem: Falls - Risk of  Goal: *Absence of Falls  Document Mukund Fall Risk and appropriate interventions in the flowsheet. Outcome: Progressing Towards Goal  Fall Risk Interventions:            Medication Interventions: Patient to call before getting OOB         History of Falls Interventions:  Investigate reason for fall, Room close to nurse's station

## 2018-01-16 VITALS
BODY MASS INDEX: 27.27 KG/M2 | TEMPERATURE: 98.8 F | OXYGEN SATURATION: 100 % | WEIGHT: 190.48 LBS | DIASTOLIC BLOOD PRESSURE: 75 MMHG | HEIGHT: 70 IN | HEART RATE: 90 BPM | RESPIRATION RATE: 18 BRPM | SYSTOLIC BLOOD PRESSURE: 134 MMHG

## 2018-01-16 LAB
BASOPHILS # BLD: 0.1 K/UL
BASOPHILS NFR BLD: 1 %
BLASTS NFR BLD MANUAL: 0 %
DIFFERENTIAL METHOD BLD: ABNORMAL
EOSINOPHIL # BLD: 0.2 K/UL
EOSINOPHIL NFR BLD: 2 %
ERYTHROCYTE [DISTWIDTH] IN BLOOD BY AUTOMATED COUNT: 15.8 % (ref 11.5–14.5)
HCT VFR BLD AUTO: 20.4 % (ref 35–47)
HGB BLD-MCNC: 7.2 G/DL (ref 11.5–16)
LYMPHOCYTES # BLD: 4.2 K/UL
LYMPHOCYTES NFR BLD: 42 %
MANUAL DIFFERENTIAL PERFORMED BLD QL: ABNORMAL
MCH RBC QN AUTO: 34.1 PG (ref 26–34)
MCHC RBC AUTO-ENTMCNC: 35.3 G/DL (ref 30–36.5)
MCV RBC AUTO: 96.7 FL (ref 80–99)
METAMYELOCYTES NFR BLD MANUAL: 0 %
MONOCYTES # BLD: 0.9 K/UL
MONOCYTES NFR BLD: 9 %
MYELOCYTES NFR BLD MANUAL: 0 %
NEUTS BAND NFR BLD MANUAL: 0 %
NEUTS SEG # BLD: 4.5 K/UL
NEUTS SEG NFR BLD: 46 %
NRBC # BLD: 0.06 K/UL (ref 0–0.01)
NRBC BLD-RTO: 0.6 PER 100 WBC
OTHER CELLS NFR BLD MANUAL: 0 %
PLATELET # BLD AUTO: 207 K/UL (ref 150–400)
PROMYELOCYTES NFR BLD MANUAL: 0 %
RBC # BLD AUTO: 2.11 M/UL (ref 3.8–5.2)
RBC MORPH BLD: ABNORMAL
WBC # BLD AUTO: 9.9 K/UL (ref 3.6–11)
WBC NRBC COR # BLD: ABNORMAL 10*3/UL

## 2018-01-16 PROCEDURE — 74011250637 HC RX REV CODE- 250/637: Performed by: INTERNAL MEDICINE

## 2018-01-16 PROCEDURE — 36415 COLL VENOUS BLD VENIPUNCTURE: CPT | Performed by: INTERNAL MEDICINE

## 2018-01-16 PROCEDURE — 74011250636 HC RX REV CODE- 250/636: Performed by: INTERNAL MEDICINE

## 2018-01-16 PROCEDURE — 85027 COMPLETE CBC AUTOMATED: CPT | Performed by: INTERNAL MEDICINE

## 2018-01-16 RX ORDER — FENTANYL 75 UG/H
1 PATCH TRANSDERMAL
Qty: 10 PATCH | Refills: 0 | Status: SHIPPED | OUTPATIENT
Start: 2018-01-16 | End: 2018-03-02 | Stop reason: SDUPTHER

## 2018-01-16 RX ORDER — HYDROCODONE BITARTRATE AND ACETAMINOPHEN 10; 325 MG/1; MG/1
1 TABLET ORAL
Qty: 120 TAB | Refills: 0 | Status: SHIPPED | OUTPATIENT
Start: 2018-01-16 | End: 2018-02-13 | Stop reason: SDUPTHER

## 2018-01-16 RX ADMIN — PANTOPRAZOLE SODIUM 40 MG: 40 TABLET, DELAYED RELEASE ORAL at 08:32

## 2018-01-16 RX ADMIN — HYDROCODONE BITARTRATE AND ACETAMINOPHEN 2 TABLET: 5; 325 TABLET ORAL at 10:59

## 2018-01-16 RX ADMIN — FOLIC ACID 1 MG: 1 TABLET ORAL at 09:22

## 2018-01-16 RX ADMIN — ONDANSETRON HYDROCHLORIDE 4 MG: 2 INJECTION, SOLUTION INTRAMUSCULAR; INTRAVENOUS at 11:07

## 2018-01-16 RX ADMIN — ONDANSETRON HYDROCHLORIDE 4 MG: 2 INJECTION, SOLUTION INTRAMUSCULAR; INTRAVENOUS at 06:12

## 2018-01-16 RX ADMIN — VALSARTAN 40 MG: 40 TABLET ORAL at 09:21

## 2018-01-16 RX ADMIN — HYDROMORPHONE HYDROCHLORIDE 0.5 MG: 2 INJECTION INTRAMUSCULAR; INTRAVENOUS; SUBCUTANEOUS at 06:12

## 2018-01-16 RX ADMIN — POLYETHYLENE GLYCOL 3350 17 G: 17 POWDER, FOR SOLUTION ORAL at 09:22

## 2018-01-16 RX ADMIN — HEPARIN SODIUM 5000 UNITS: 5000 INJECTION, SOLUTION INTRAVENOUS; SUBCUTANEOUS at 00:06

## 2018-01-16 RX ADMIN — HEPARIN SODIUM 5000 UNITS: 5000 INJECTION, SOLUTION INTRAVENOUS; SUBCUTANEOUS at 11:00

## 2018-01-16 RX ADMIN — DIPHENHYDRAMINE HYDROCHLORIDE 25 MG: 50 INJECTION, SOLUTION INTRAMUSCULAR; INTRAVENOUS at 06:12

## 2018-01-16 RX ADMIN — DIPHENHYDRAMINE HYDROCHLORIDE 25 MG: 50 INJECTION, SOLUTION INTRAMUSCULAR; INTRAVENOUS at 11:07

## 2018-01-16 NOTE — PROGRESS NOTES
Initial CM Assessment and D/C Planning:    CM noted ACO patient with [facility] PCP and RRAT score of 17. Patient is 55 yo female with Hx of sickle cell disease. She states she has been treated by Dr. Alicia Butler since the age of 25. She does not have pain management physician. Patient is independent with all ADL's and IADL's, including driving. She states she has regular visits with Dr. Alicia Butler, confirmed by chart review. Most recent office visit was 11/28/2017. Patient states she has been able to stay out of the hospital most of the time when she has a SC flare. She has never needed home health or home oxygen. Patient lives in one story private home w/ her adult daughter. Family lives close by and can assist as needed. Patient declines home health at present time. Follow-up appointment with Dr. Alicia Butler is on discharge paperwork and patient aware. 2nd IM letter given to patient and signed copy placed on chart. Care Management Interventions  PCP Verified by CM: Yes (Dr. Tiffany Becker)  Mode of Transport at Discharge: Other (see comment) (Private vehicle - patient's brother)  Transition of Care Consult (CM Consult):  Other (CM Assessment and follow-up appointment)  Compa Maynard: No  Discharge Durable Medical Equipment: No  Physical Therapy Consult: No  Occupational Therapy Consult: No  Speech Therapy Consult: No  Current Support Network: Own Home, Other, Family Lives Nearby (Patient lives with adult daughter and extended family lives close by)  Confirm Follow Up Transport: Self (Patient is independent with all ADL's including driving - family helps when needed)  Plan discussed with Pt/Family/Caregiver: Yes  Discharge Location  Discharge Placement: Home with family assistance ([facility] does not qualify for home health)    Elieser Brand, RN, BSN, ACM   - Medical Oncology  [phone]

## 2018-01-16 NOTE — DISCHARGE INSTRUCTIONS
General Discharge Instructions    Patient ID:  Bryanna Pereira  [id number]  56 y.o.  1960    Patient Instructions        The following personal items were collected during your admission and were returned to you. Take Home Medications           What to do at Home    Recommended diet: Regular Diet    Recommended activity: Activity as tolerated    Follow-up with Budd Spatz, MD  in 1 week. MyChart Activation    Thank you for requesting access to [employer]. Please follow the instructions below to securely access and download your online medical record. [employer] allows you to send messages to your doctor, view your test results, renew your prescriptions, schedule appointments, and more. How Do I Sign Up? 1. In your internet browser, go to [email or web address]  2. Click on the First Time User? Click Here link in the Sign In box. You will be redirect to the New Member Sign Up page. 3. Enter your Ammado Access Code exactly as it appears below. You will not need to use this code after youve completed the sign-up process. If you do not sign up before the expiration date, you must request a new code. Ammado Access Code: [email or web address]  Expires: 2018 12:25 PM (This is the date your Ammado access code will )    4. Enter the last four digits of your Social Security Number (xxxx) and Date of Birth (mm/dd/yyyy) as indicated and click Submit. You will be taken to the next sign-up page. 5. Create a Ammado ID. This will be your Ammado login ID and cannot be changed, so think of one that is secure and easy to remember. 6. Create a Ammado password. You can change your password at any time. 7. Enter your Password Reset Question and Answer. This can be used at a later time if you forget your password. 8. Enter your e-mail address. You will receive e-mail notification when new information is available in [address] 9. Click Sign Up.  You can now view and download portions of your medical record. 10. Click the Download Summary menu link to download a portable copy of your medical information. Additional Information    If you have questions, please visit the Frequently Asked Questions section of the [employer] website at [email or web address] Remember, [employer] is NOT to be used for urgent needs. For medical emergencies, dial 911. DISCHARGE SUMMARY from Nurse    PATIENT INSTRUCTIONS:    After general anesthesia or intravenous sedation, for 24 hours or while taking prescription Narcotics:  · Limit your activities  · Do not drive and operate hazardous machinery  · Do not make important personal or business decisions  · Do  not drink alcoholic beverages  · If you have not urinated within 8 hours after discharge, please contact your surgeon on call. Report the following to your surgeon:  · Excessive pain, swelling, redness or odor of or around the surgical area  · Temperature over 100.5  · Nausea and vomiting lasting longer than 4 hours or if unable to take medications  · Any signs of decreased circulation or nerve impairment to extremity: change in color, persistent  numbness, tingling, coldness or increase pain  · Any questions    What to do at Home:          *  Please give a list of your current medications to your Primary Care Provider. *  Please update this list whenever your medications are discontinued, doses are      changed, or new medications (including over-the-counter products) are added. *  Please carry medication information at all times in case of emergency situations. These are general instructions for a healthy lifestyle:    No smoking/ No tobacco products/ Avoid exposure to second hand smoke  Surgeon General's Warning:  Quitting smoking now greatly reduces serious risk to your health.     Obesity, smoking, and sedentary lifestyle greatly increases your risk for illness    A healthy diet, regular physical exercise & weight monitoring are important for maintaining a healthy lifestyle    You may be retaining fluid if you have a history of heart failure or if you experience any of the following symptoms:  Weight gain of 3 pounds or more overnight or 5 pounds in a week, increased swelling in our hands or feet or shortness of breath while lying flat in bed. Please call your doctor as soon as you notice any of these symptoms; do not wait until your next office visit. Recognize signs and symptoms of STROKE:    F-face looks uneven    A-arms unable to move or move unevenly    S-speech slurred or non-existent    T-time-call 911 as soon as signs and symptoms begin-DO NOT go       Back to bed or wait to see if you get better-TIME IS BRAIN. Warning Signs of HEART ATTACK     Call 911 if you have these symptoms:   Chest discomfort. Most heart attacks involve discomfort in the center of the chest that lasts more than a few minutes, or that goes away and comes back. It can feel like uncomfortable pressure, squeezing, fullness, or pain.  Discomfort in other areas of the upper body. Symptoms can include pain or discomfort in one or both arms, the back, neck, jaw, or stomach.  Shortness of breath with or without chest discomfort.  Other signs may include breaking out in a cold sweat, nausea, or lightheadedness. Don't wait more than five minutes to call 911 - MINUTES MATTER! Fast action can save your life. Calling 911 is almost always the fastest way to get lifesaving treatment. Emergency Medical Services staff can begin treatment when they arrive -- up to an hour sooner than if someone gets to the hospital by car. The discharge information has been reviewed with the patient. The patient verbalized understanding.   Discharge medications reviewed with the patient and appropriate educational materials and side effects teaching were provided. ___________________________________________________________________________________________________________________________________      Information obtained by :  I understand that if any problems occur once I am at home I am to contact my physician. I understand and acknowledge receipt of the instructions indicated above.                                                                                                                                            Physician's or R.N.'s Signature                                                                  Date/Time                                                                                                                                              Patient or Representative Signature                                                          Date/Time

## 2018-01-16 NOTE — DISCHARGE SUMMARY
[facility] [address]    Metta Smoke  MR#: [id number]  : 1960  ACCOUNT #: [de-identified]   ADMIT DATE: 2018  DISCHARGE DATE:     HISTORY OF PRESENT ILLNESS:  The patient is a 72-year-old lady with SC hemoglobinopathy, presented to the emergency room because of a 2-3 day history of increasing generalized pain. She has been taking her analgesics, but no meaningful improvement occurred and she was subsequently admitted. PAST MEDICAL HISTORY/SOCIAL HISTORY/REVIEW OF SYSTEMS/FAMILY HISTORY/PHYSICAL EXAMINATION: As in admitting H and P.    LABORATORY VALUES:  Yesterday, hemoglobin was 8.3 with white count 5.9, MCV was 93.8, platelet count 136, differential 38 segs, 25 lymphocytes ,13 monocytes, 3 eosinophils and 1 basophil. White count reached a low of 6.1 and at the time of discharge 7.2 with a white count of 9.9. Urinalysis was normal.  Abnormalities on the comprehensive profile revealed  bilirubin of 1.9, reaching a high of 2.2. Two days prior to discharge, bilirubin was 1.4. She had a positive MRSA culture from her nares. RADIOGRAPHS:  Chest x-ray negative. HOSPITAL COURSE:  The patient was admitted and placed on parenteral fluids as well as analgesics and O2. With this regimen, she gradually improved. There was no aphasia noted  based on the results of the hematologic findings. At the time of discharge, the patient was back to her baseline with minimal pain. FINAL DIAGNOSES  1. Painful sickle crises. 2.  SC hemoglobinopathy. 3.  Primary hypertension. 4.  Depression by history. 5.  Status post cholecystectomy. 6.  Status post treatment for hepatitis C with complete resolution. DISPOSITION: The patient will be discharged home ambulatory on a regular diet. DISCHARGE MEDICATIONS   Include the followin. Celexa 10 mg daily. 2.  Cyclobenzaprine 10 mg t.i.d., p.r.n.   3.  Duragesic patch 75 mcg every 3 days.    4.  Folic acid 1 mg daily.   5.  Hydrocodone/APAP 10/325, 1 q.6h., p.r.n.  6.  Losartan/HCTZ 50/12.5 q.a.m.  7.  Omeprazole 20 daily. FOLLOWUP:  The patient to return to the office in the next week. KAREL Escudero MD       LAB/JN  D: 01/16/2018 08:42     T: 01/16/2018 09:37  JOB #: [id number]

## 2018-01-16 NOTE — PROGRESS NOTES
Bedside shift change report given to Aurelia Galicia (oncoming nurse) by Trenton French RN (offgoing nurse). Report included the following information SBAR, Kardex, Intake/Output and Recent Results.

## 2018-01-29 ENCOUNTER — OFFICE VISIT (OUTPATIENT)
Dept: INTERNAL MEDICINE CLINIC | Age: 58
End: 2018-01-29

## 2018-01-29 VITALS
HEART RATE: 96 BPM | DIASTOLIC BLOOD PRESSURE: 67 MMHG | HEIGHT: 70 IN | RESPIRATION RATE: 20 BRPM | BODY MASS INDEX: 27.53 KG/M2 | WEIGHT: 192.3 LBS | SYSTOLIC BLOOD PRESSURE: 101 MMHG | TEMPERATURE: 98 F | OXYGEN SATURATION: 99 %

## 2018-01-29 DIAGNOSIS — D57.1 HB-SS DISEASE WITHOUT CRISIS (HCC): ICD-10-CM

## 2018-01-29 DIAGNOSIS — F32.A DEPRESSION, UNSPECIFIED DEPRESSION TYPE: ICD-10-CM

## 2018-01-29 DIAGNOSIS — I10 ESSENTIAL HYPERTENSION: ICD-10-CM

## 2018-01-29 DIAGNOSIS — H93.A9 PULSATILE TINNITUS: ICD-10-CM

## 2018-01-29 DIAGNOSIS — D57.00 SICKLE CELL CRISIS (HCC): Primary | ICD-10-CM

## 2018-01-29 NOTE — MR AVS SNAPSHOT
[facility] 
 
 
 [address] [phone] 
[phone] Patient: Priyanka Hennessy MRN: [id number] :1960 Visit Information Date & Time Provider Department Dept. Phone Encounter #  
 2018 12:00 PM Jaswant Laguerre MD [facility] [id number] Your Appointments 2018 10:30 AM  
Any with Jaswant Laguerre MD  
[address] [place] [address] and Primary Care [address] [place] [address]) Appt Note: 3 month f/u htn  
 [address] [place]  
  
   
 [id number] [phone] Upcoming Health Maintenance Date Due  
 PAP AKA CERVICAL CYTOLOGY 2018 BREAST CANCER SCRN MAMMOGRAM 2018* FOBT Q 1 YEAR AGE 50-75 2020* Pneumococcal 19-64 Highest Risk (3 of 3 - PCV13) 10/31/2018 DTaP/Tdap/Td series (2 - Td) 2026 *Topic was postponed. The date shown is not the original due date. Allergies as of 2018  Review Complete On: 2018 By: Beto Moore Severity Noted Reaction Type Reactions Dilaudid [Hydromorphone (Bulk)]  10/03/2014    Itching Can tolerate with benadryl and ondansetron Percocet [Oxycodone-acetaminophen]  10/03/2014    Itching Current Immunizations  Reviewed on 2017 Name Date Influenza Vaccine (Quad) PF 10/31/2017 Not reviewed this visit Vitals BP Pulse Temp Resp Height(growth percentile) Weight(growth percentile) 101/67 (BP 1 Location: Left arm, BP Patient Position: Sitting) 96 98 °F (36.7 °C) (Oral) 20 5' 10\" (1.778 m) 192 lb 4.8 oz (87.2 kg) SpO2 BMI OB Status Smoking Status 99% 27.59 kg/m2 Postmenopausal Never Smoker BMI and BSA Data Body Mass Index Body Surface Area  
 27.59 kg/m 2 2.08 m 2 Preferred Pharmacy Pharmacy Name Phone  [address] [place] [employer] [address] [facility] [phone] Your Updated Medication List  
  
   
This list is accurate as of: 1/29/18  2:39 PM.  Always use your most recent med list.  
  
  
  
  
 citalopram 10 mg tablet Commonly known as:  CELEXA  
TAKE 1 TABLET BY MOUTH EVERY NIGHT AT BEDTIME  
  
 cyclobenzaprine 10 mg tablet Commonly known as:  FLEXERIL Take 1 Tab by mouth three (3) times daily as needed for Muscle Spasm(s). fentaNYL 75 mcg/hr Commonly known as:  DURAGESIC  
1 Patch by TransDERmal route every seventy-two (72) hours. Max Daily Amount: 1 Patch. fluocinoNIDE 0.05 % topical cream  
Commonly known as:  LIDEX  
two (2) times daily as needed. folic acid 1 mg tablet Commonly known as:  FOLVITE  
TAKE 1 TABLET BY MOUTH EVERY DAY  
  
 HYDROcodone-acetaminophen  mg tablet Commonly known as:  Sofi Bernadette Take 1 Tab by mouth every six (6) hours as needed for Pain. Max Daily Amount: 4 Tabs. Indications: sickl;e cell crisis  
  
 losartan-hydroCHLOROthiazide 50-12.5 mg per tablet Commonly known as:  HYZAAR  
TAKE 1 TABLET BY MOUTH DAILY  
  
 OMEPRAZOLE PO Take 20 mg by mouth. Introducing [facility] HE[facility]! [employer] introduces [employer] patient portal. Now you can access parts of your medical record, email your doctor's office, and request medication refills online. 1. In your internet browser, go to [email or web address] 2. Click on the First Time User? Click Here link in the Sign In box. You will see the New Member Sign Up page. 3. Enter your [employer] Access Code exactly as it appears below. You will not need to use this code after youve completed the sign-up process. If you do not sign up before the expiration date, you must request a new code. · [employer] Access Code: [email or web address] Expires: 4/29/2018  2:39 PM 
 
4.  Enter the last four digits of your Social Security Number (xxxx) and Date of Birth (mm/dd/yyyy) as indicated and click Submit. You will be taken to the next sign-up page. 5. Create a Splinter.me ID. This will be your Splinter[employer]me login ID and cannot be changed, so think of one that is secure and easy to remember. 6. Create a Splinter[employer]me password. You can change your password at any time. 7. Enter your Password Reset Question and Answer. This can be used at a later time if you forget your password. 8. Enter your e-mail address. You will receive e-mail notification when new information is available in [address] 9. Click Sign Up. You can now view and download portions of your medical record. 10. Click the Download Summary menu link to download a portable copy of your medical information. If you have questions, please visit the Frequently Asked Questions section of the [employer]me website. Remember, [employer] is NOT to be used for urgent needs. For medical emergencies, dial 911. Now available from your iPhone and Android! Please provide this summary of care documentation to your next provider. Your primary care clinician is listed as Ezequiel Lynne. If you have any questions after today's visit, please call [phone].

## 2018-01-31 PROBLEM — D57.00 SICKLE CELL PAIN CRISIS (HCC): Status: RESOLVED | Noted: 2018-01-05 | Resolved: 2018-01-31

## 2018-01-31 PROBLEM — I47.29 NSVT (NONSUSTAINED VENTRICULAR TACHYCARDIA) (HCC): Status: RESOLVED | Noted: 2017-05-29 | Resolved: 2018-01-31

## 2018-01-31 LAB
BASOPHILS # BLD AUTO: 0 X10E3/UL (ref 0–0.2)
BASOPHILS NFR BLD AUTO: 0 %
EOSINOPHIL # BLD AUTO: 0.2 X10E3/UL (ref 0–0.4)
EOSINOPHIL NFR BLD AUTO: 3 %
ERYTHROCYTE [DISTWIDTH] IN BLOOD BY AUTOMATED COUNT: 17 % (ref 12.3–15.4)
HCT VFR BLD AUTO: 23.3 % (ref 34–46.6)
HGB BLD-MCNC: 7.7 G/DL (ref 11.1–15.9)
IMM GRANULOCYTES # BLD: 0 X10E3/UL (ref 0–0.1)
IMM GRANULOCYTES NFR BLD: 0 %
LYMPHOCYTES # BLD AUTO: 2.7 X10E3/UL (ref 0.7–3.1)
LYMPHOCYTES NFR BLD AUTO: 33 %
MCH RBC QN AUTO: 33.3 PG (ref 26.6–33)
MCHC RBC AUTO-ENTMCNC: 33 G/DL (ref 31.5–35.7)
MCV RBC AUTO: 101 FL (ref 79–97)
MONOCYTES # BLD AUTO: 0.8 X10E3/UL (ref 0.1–0.9)
MONOCYTES NFR BLD AUTO: 10 %
NEUTROPHILS # BLD AUTO: 4.4 X10E3/UL (ref 1.4–7)
NEUTROPHILS NFR BLD AUTO: 54 %
NRBC BLD AUTO-RTO: 1 %
PLATELET # BLD AUTO: 348 X10E3/UL (ref 150–379)
RBC # BLD AUTO: 2.31 X10E6/UL (ref 3.77–5.28)
WBC # BLD AUTO: 8.1 X10E3/UL (ref 3.4–10.8)

## 2018-01-31 NOTE — PROGRESS NOTES
[facility]  [place] [address]  [address] [place] [id number]  Phone:  [phone]  Fax: [phone]       Chief Complaint   Patient presents with   [facility] Follow Up     patient was admitted to [address] [place] [address] due to Sickle Cell Crisis   . SUBJECTIVE:    Scotty Aquino is a 56 y.o. female   Comes in for a return visit. She was most recently hospitalized for a sickle cell crisis. Since being home, she is doing well with her current analgesics. She continues to have significant pulsatile tinnitus. Previously MRA was negative for aneurysm. She remains reasonably physically active. She has a past history of primary hypertension and depression both of which are stable. MedDATA/gwo            Current Outpatient Prescriptions   Medication Sig Dispense Refill    HYDROcodone-acetaminophen (NORCO)  mg tablet Take 1 Tab by mouth every six (6) hours as needed for Pain. Max Daily Amount: 4 Tabs. Indications: sickl;e cell crisis 120 Tab 0    fentaNYL (DURAGESIC) 75 mcg/hr 1 Patch by TransDERmal route every seventy-two (72) hours. Max Daily Amount: 1 Patch. 10 Patch 0    OMEPRAZOLE PO Take 20 mg by mouth.  cyclobenzaprine (FLEXERIL) 10 mg tablet Take 1 Tab by mouth three (3) times daily as needed for Muscle Spasm(s). 50 Tab 1    citalopram (CELEXA) 10 mg tablet TAKE 1 TABLET BY MOUTH EVERY NIGHT AT BEDTIME 90 Tab 3    losartan-hydroCHLOROthiazide (HYZAAR) 50-12.5 mg per tablet TAKE 1 TABLET BY MOUTH DAILY 90 Tab 3    folic acid (FOLVITE) 1 mg tablet TAKE 1 TABLET BY MOUTH EVERY DAY 90 Tab 3    fluocinoNIDE (LIDEX) 0.05 % topical cream two (2) times daily as needed.        Past Medical History:   Diagnosis Date    Anemia     SC hemoglobinopathy    Chronic pain     Depression     Hypertension     Liver disease     hepatitis C; pt reports \"cured\"    Sickle cell disease ([address]      Past Surgical History:   Procedure Laterality Date    BREAST SURGERY PROCEDURE UNLISTED      2 cysts removed from R breast    CHEST SURGERY PROCEDURE UNLISTED      status post thor for removal of a benign     HX CHOLECYSTECTOMY      HX ORTHOPAEDIC      bony curettage of an ostial myelitis focus     Allergies   Allergen Reactions    Dilaudid [Hydromorphone (Bulk)] Itching     Can tolerate with benadryl and ondansetron    Percocet [Oxycodone-Acetaminophen] Itching         REVIEW OF SYSTEMS:  General: negative for - chills or fever  ENT: negative for - headaches, nasal congestion or tinnitus  Respiratory: negative for - cough, hemoptysis, shortness of breath or wheezing  Cardiovascular : negative for - chest pain, edema, palpitations or shortness of breath  Gastrointestinal: negative for - abdominal pain, blood in stools, heartburn or nausea/vomiting  Genito-Urinary: no dysuria, trouble voiding, or hematuria  Musculoskeletal: negative for - gait disturbance, joint pain, joint stiffness or joint swelling  Neurological: no TIA or stroke symptoms  Hematologic: no bruises, no bleeding, no swollen glands  Integument: no lumps, mole changes, nail changes or rash  Endocrine: no malaise/lethargy or unexpected weight changes      Social History     Social History    Marital status:      Spouse name: N/A    Number of children: 2    Years of education: N/A     Occupational History    disabled---Sickle cell disease      Social History Main Topics    Smoking status: Never Smoker    Smokeless tobacco: Never Used    Alcohol use No    Drug use: No    Sexual activity: Yes     Partners: Male     Other Topics Concern    None     Social History Narrative     Family History   Problem Relation Age of Onset    Hypertension Mother     Hypertension Father        OBJECTIVE:    Visit Vitals    /67 (BP 1 Location: Left arm, BP Patient Position: Sitting)    Pulse 96    Temp 98 °F (36.7 °C) (Oral)    Resp 20    Ht 5' 10\" (1.778 m)    Wt 192 lb 4.8 oz (87.2 kg)    SpO2 99%    BMI 27.59 kg/m2     CONSTITUTIONAL: well , well nourished, appears age appropriate  EYES: perrla, eom intact  ENMT:moist mucous membranes, pharynx clear  NECK: supple. Thyroid normal  RESPIRATORY: Chest: clear to ascultation and percussion   CARDIOVASCULAR: Heart: regular rate and rhythm  GASTROINTESTINAL: Abdomen: soft, bowel sounds active  HEMATOLOGIC: no pathological lymph nodes palpated  MUSCULOSKELETAL: Extremities: no edema, pulse 1+   INTEGUMENT: No unusual rashes or suspicious skin lesions noted. Nails appear normal.  NEUROLOGIC: non-focal exam   MENTAL STATUS: alert and oriented, appropriate affect      ASSESSMENT:  1. Sickle cell crisis (HCC)    2. Pulsatile tinnitus    3. Essential hypertension    4. Depression, unspecified depression type    5. Hb-SS disease without crisis ([facility]      Diagnoses and all orders for this visit:    1. Sickle cell crisis [facility])  Assessment & Plan:  Stable, based on history, physical exam and review of pertinent labs, studies and medications; meds reconciled; continue current treatment plan. Lab Results   Component Value Date/Time    WBC 8.1 01/30/2018 05:23 PM    HGB 7.7 01/30/2018 05:23 PM    HCT 23.3 01/30/2018 05:23 PM    PLATELET 010 24/49/8527 05:23 PM    Creatinine 0.80 01/13/2018 01:51 PM    BUN 6 01/13/2018 01:51 PM    Potassium 3.7 01/13/2018 01:51 PM       Orders:  -     CBC WITH AUTOMATED DIFF    2. Pulsatile tinnitus    3. Essential hypertension    4. Depression, unspecified depression type    5. Hb-SS disease without crisis [facility])  Assessment & Plan:  Stable, based on history, physical exam and review of pertinent labs, studies and medications; meds reconciled; continue current treatment plan.   Lab Results   Component Value Date/Time    WBC 8.1 01/30/2018 05:23 PM    HGB 7.7 01/30/2018 05:23 PM    HCT 23.3 01/30/2018 05:23 PM    PLATELET 620 93/02/7173 05:23 PM    Creatinine 0.80 01/13/2018 01:51 PM    BUN 6 01/13/2018 01:51 PM    Potassium 3.7 01/13/2018 01:51 PM PLAN:    1. Her sickle cell disease is doing well. She will continue analgesics as prescribed. 2. There is not much I can add as far as her pulsatile tinnitus is concerned. I offered an ENT evaluation but she declines. An MRA to rule out aneurysm has indeed been done. 3. She is normotensive today, no adjustments made. 4. Her depression is quite stable. She takes her antidepressant as prescribed. 5. I encouraged her to remain physically active. MedDATA/gwo     . Orders Placed This Encounter    CBC WITH AUTOMATED DIFF         Follow-up Disposition:  Return in about 3 months (around 4/29/2018).       Ariadna Hyman MD

## 2018-01-31 NOTE — ASSESSMENT & PLAN NOTE
Stable, based on history, physical exam and review of pertinent labs, studies and medications; meds reconciled; continue current treatment plan.   Lab Results   Component Value Date/Time    WBC 8.1 01/30/2018 05:23 PM    HGB 7.7 01/30/2018 05:23 PM    HCT 23.3 01/30/2018 05:23 PM    PLATELET 676 09/53/5063 05:23 PM    Creatinine 0.80 01/13/2018 01:51 PM    BUN 6 01/13/2018 01:51 PM    Potassium 3.7 01/13/2018 01:51 PM

## 2018-02-13 DIAGNOSIS — D57.00 SICKLE CELL CRISIS (HCC): ICD-10-CM

## 2018-02-14 RX ORDER — HYDROCODONE BITARTRATE AND ACETAMINOPHEN 10; 325 MG/1; MG/1
1 TABLET ORAL
Qty: 120 TAB | Refills: 0 | Status: SHIPPED | OUTPATIENT
Start: 2018-02-14 | End: 2018-03-13 | Stop reason: SDUPTHER

## 2018-03-02 ENCOUNTER — TELEPHONE (OUTPATIENT)
Dept: INTERNAL MEDICINE CLINIC | Age: 58
End: 2018-03-02

## 2018-03-02 DIAGNOSIS — D57.00 SICKLE CELL CRISIS (HCC): ICD-10-CM

## 2018-03-02 RX ORDER — FENTANYL 75 UG/H
1 PATCH TRANSDERMAL
Qty: 10 PATCH | Refills: 0 | Status: SHIPPED | OUTPATIENT
Start: 2018-03-02 | End: 2018-04-17 | Stop reason: SDUPTHER

## 2018-03-13 ENCOUNTER — OFFICE VISIT (OUTPATIENT)
Dept: INTERNAL MEDICINE CLINIC | Age: 58
End: 2018-03-13

## 2018-03-13 VITALS
HEART RATE: 87 BPM | WEIGHT: 187.2 LBS | OXYGEN SATURATION: 100 % | HEIGHT: 70 IN | BODY MASS INDEX: 26.8 KG/M2 | DIASTOLIC BLOOD PRESSURE: 71 MMHG | RESPIRATION RATE: 14 BRPM | TEMPERATURE: 98.7 F | SYSTOLIC BLOOD PRESSURE: 110 MMHG

## 2018-03-13 DIAGNOSIS — D57.1 HB-SS DISEASE WITHOUT CRISIS (HCC): ICD-10-CM

## 2018-03-13 DIAGNOSIS — I10 ESSENTIAL HYPERTENSION: ICD-10-CM

## 2018-03-13 DIAGNOSIS — R06.09 DYSPNEA ON EXERTION: ICD-10-CM

## 2018-03-13 DIAGNOSIS — R53.83 FATIGUE, UNSPECIFIED TYPE: ICD-10-CM

## 2018-03-13 DIAGNOSIS — R07.9 CHEST PAIN, UNSPECIFIED TYPE: Primary | ICD-10-CM

## 2018-03-13 RX ORDER — HYDROCODONE BITARTRATE AND ACETAMINOPHEN 10; 325 MG/1; MG/1
1 TABLET ORAL
Qty: 120 TAB | Refills: 0 | Status: SHIPPED | OUTPATIENT
Start: 2018-03-13 | End: 2018-04-17 | Stop reason: SDUPTHER

## 2018-03-13 NOTE — PROGRESS NOTES
[address] [place] [address] and Primary Care  [place] [address]  [address]  [facility] [address]  Phone:  [phone]  Fax: [phone]       Chief Complaint   Patient presents with    Shortness of Breath     Patient states that she gets short of breath when walking, and can only walk short distances x 1.5 weeks   . SUBJECTIVE:    Michelle Arreola is a 56 y.o. female comes in for return visit complaining of dyspnea on exertion, profound fatigue and weakness. This is new. This has been present now for the last several weeks. She denies any orthopnea or PND. She has had mild pain and takes her analgesics for the sickle cell anemia. She has a past history of primary hypertension and depression which is stable. Current Outpatient Prescriptions   Medication Sig Dispense Refill    HYDROcodone-acetaminophen (NORCO)  mg tablet Take 1 Tab by mouth every six (6) hours as needed for Pain. Max Daily Amount: 4 Tabs. Indications: sickl;e cell crisis 120 Tab 0    fentaNYL (DURAGESIC) 75 mcg/hr 1 Patch by TransDERmal route every seventy-two (72) hours. Max Daily Amount: 1 Patch. 10 Patch 0    OMEPRAZOLE PO Take 20 mg by mouth.  cyclobenzaprine (FLEXERIL) 10 mg tablet Take 1 Tab by mouth three (3) times daily as needed for Muscle Spasm(s). 50 Tab 1    citalopram (CELEXA) 10 mg tablet TAKE 1 TABLET BY MOUTH EVERY NIGHT AT BEDTIME 90 Tab 3    losartan-hydroCHLOROthiazide (HYZAAR) 50-12.5 mg per tablet TAKE 1 TABLET BY MOUTH DAILY 90 Tab 3    folic acid (FOLVITE) 1 mg tablet TAKE 1 TABLET BY MOUTH EVERY DAY 90 Tab 3    fluocinoNIDE (LIDEX) 0.05 % topical cream two (2) times daily as needed.        Past Medical History:   Diagnosis Date    Anemia     SC hemoglobinopathy    Chronic pain     Depression     Hypertension     Liver disease     hepatitis C; pt reports \"cured\"    Sickle cell disease ([address]      Past Surgical History:   Procedure Laterality Date    BREAST SURGERY PROCEDURE UNLISTED      2 cysts removed from R breast    CHEST SURGERY PROCEDURE UNLISTED      status post thor for removal of a benign     HX CHOLECYSTECTOMY      HX ORTHOPAEDIC      bony curettage of an ostial myelitis focus     Allergies   Allergen Reactions    Dilaudid [Hydromorphone (Bulk)] Itching     Can tolerate with benadryl and ondansetron    Percocet [Oxycodone-Acetaminophen] Itching         REVIEW OF SYSTEMS:  General: negative for - chills or fever  ENT: negative for - headaches, nasal congestion or tinnitus  Respiratory: negative for - cough, hemoptysis, shortness of breath or wheezing  Cardiovascular : negative for - chest pain, edema, palpitations or shortness of breath  Gastrointestinal: negative for - abdominal pain, blood in stools, heartburn or nausea/vomiting  Genito-Urinary: no dysuria, trouble voiding, or hematuria  Musculoskeletal: negative for - gait disturbance, joint pain, joint stiffness or joint swelling  Neurological: no TIA or stroke symptoms  Hematologic: no bruises, no bleeding, no swollen glands  Integument: no lumps, mole changes, nail changes or rash  Endocrine: no malaise/lethargy or unexpected weight changes      Social History     Social History    Marital status:      Spouse name: N/A    Number of children: 2    Years of education: N/A     Occupational History    disabled---Sickle cell disease      Social History Main Topics    Smoking status: Never Smoker    Smokeless tobacco: Never Used    Alcohol use No    Drug use: No    Sexual activity: Yes     Partners: Male     Other Topics Concern    None     Social History Narrative     Family History   Problem Relation Age of Onset    Hypertension Mother     Hypertension Father        OBJECTIVE:    Visit Vitals    /71 (BP 1 Location: Left arm, BP Patient Position: Sitting)    Pulse 87    Temp 98.7 °F (37.1 °C) (Oral)    Resp 14    Ht 5' 10\" (1.778 m)    Wt 187 lb 3.2 oz (84.9 kg)    SpO2 100%    BMI 26.86 kg/m2     CONSTITUTIONAL: well , well nourished, appears age appropriate  EYES: perrla, eom intact  ENMT:moist mucous membranes, pharynx clear  NECK: supple. Thyroid normal  RESPIRATORY: Chest: clear to ascultation and percussion   CARDIOVASCULAR: Heart: regular rate and rhythm  GASTROINTESTINAL: Abdomen: soft, bowel sounds active  HEMATOLOGIC: no pathological lymph nodes palpated  MUSCULOSKELETAL: Extremities: no edema, pulse 1+   INTEGUMENT: No unusual rashes or suspicious skin lesions noted. Nails appear normal.  NEUROLOGIC: non-focal exam   MENTAL STATUS: alert and oriented, appropriate affect      ASSESSMENT:  1. Chest pain, unspecified type    2. Fatigue, unspecified type    3. Dyspnea on exertion    4. Hb-SS disease without crisis ([address]    5. Essential hypertension        PLAN:    1. She had vague chest pain. I am not entirely sure that this is clinically significant, as it relates to coronary artery disease. Her EKG is entirely normal.  I will await the results of her lab work before potentially pursuing a stress test.    2. As far as her fatigue is concerned, I am not entirely sure of the etiology of this, as well as the dyspnea on exertion. I will await the results of the lab work. 3. She will continue analgesics for her sickle cell disease. 4. Blood pressure is excellent today and no adjustments were made. 5. She is also losing weight, but this does not appear to be organic. .  Orders Placed This Encounter    CBC WITH AUTOMATED DIFF    TSH 3RD GENERATION    T4, FREE    BNP    AMB POC EKG ROUTINE W/ 12 LEADS, INTER & REP    HYDROcodone-acetaminophen (NORCO)  mg tablet         Follow-up Disposition:  Return in about 3 months (around 6/13/2018).       Oscar Canavan, MD

## 2018-03-13 NOTE — MR AVS SNAPSHOT
[facility] 
 
 
 [address] [address] 
[phone] Patient: Jordyn Fair MRN: [id number] :1960 Visit Information Date & Time Provider Department Dept. Phone Encounter #  
 3/13/2018  9:15 AM Yudith Yip [facility] and Primary Care [phone] [id number] Your Appointments 2018 12:00 PM  
Any with Rihcard Diehl MD  
[address] [place] [address] and Primary Care [address] [place] [address]) Appt Note: 3 month f/u htn; 3 month follow up  
 [address] [facility]  
  
   
 [address] [id number] Upcoming Health Maintenance Date Due  
 BREAST CANCER SCRN MAMMOGRAM 2017 PAP AKA CERVICAL CYTOLOGY 2018 FOBT Q 1 YEAR AGE 50-75 2020* Pneumococcal 19-64 Highest Risk (3 of 3 - PCV13) 10/31/2018 DTaP/Tdap/Td series (2 - Td) 2026 *Topic was postponed. The date shown is not the original due date. Allergies as of 3/13/2018  Review Complete On: 3/13/2018 By: David Naik Severity Noted Reaction Type Reactions Dilaudid [Hydromorphone (Bulk)]  10/03/2014    Itching Can tolerate with benadryl and ondansetron Percocet [Oxycodone-acetaminophen]  10/03/2014    Itching Current Immunizations  Reviewed on 2017 Name Date Influenza Vaccine (Quad) PF 10/31/2017 Not reviewed this visit You Were Diagnosed With   
  
 Codes Comments Chest pain, unspecified type    -  Primary ICD-10-CM: R07.9 ICD-9-CM: 786.50 Fatigue, unspecified type     ICD-10-CM: R53.83 ICD-9-CM: 780.79 Dyspnea on exertion     ICD-10-CM: R06.09 
ICD-9-CM: 786.09 Hb-SS disease without crisis [facility])     ICD-10-CM: D57.1 ICD-9-CM: 282.61 Essential hypertension     ICD-10-CM: I10 
ICD-9-CM: 401.9 Vitals BP Pulse Temp Resp Height(growth percentile) Weight(growth percentile) 110/71 (BP 1 Location: Left arm, BP Patient Position: Sitting) 87 98.7 °F (37.1 °C) (Oral) 14 5' 10\" (1.778 m) 187 lb 3.2 oz (84.9 kg) SpO2 BMI OB Status Smoking Status 100% 26.86 kg/m2 Postmenopausal Never Smoker Vitals History BMI and BSA Data Body Mass Index Body Surface Area  
 26.86 kg/m 2 2.05 m 2 Preferred Pharmacy Pharmacy Name Phone [address] [phone] Your Updated Medication List  
  
   
This list is accurate as of 3/13/18 12:45 PM.  Always use your most recent med list.  
  
  
  
  
 citalopram 10 mg tablet Commonly known as:  CELEXA  
TAKE 1 TABLET BY MOUTH EVERY NIGHT AT BEDTIME  
  
 cyclobenzaprine 10 mg tablet Commonly known as:  FLEXERIL Take 1 Tab by mouth three (3) times daily as needed for Muscle Spasm(s). fentaNYL 75 mcg/hr Commonly known as:  DURAGESIC  
1 Patch by TransDERmal route every seventy-two (72) hours. Max Daily Amount: 1 Patch. fluocinoNIDE 0.05 % topical cream  
Commonly known as:  LIDEX  
two (2) times daily as needed. folic acid 1 mg tablet Commonly known as:  FOLVITE  
TAKE 1 TABLET BY MOUTH EVERY DAY  
  
 HYDROcodone-acetaminophen  mg tablet Commonly known as:  Gregorio Fraction Take 1 Tab by mouth every six (6) hours as needed for Pain. Max Daily Amount: 4 Tabs. Indications: sickl;e cell crisis  
  
 losartan-hydroCHLOROthiazide 50-12.5 mg per tablet Commonly known as:  HYZAAR  
TAKE 1 TABLET BY MOUTH DAILY  
  
 OMEPRAZOLE PO Take 20 mg by mouth. Prescriptions Printed Refills HYDROcodone-acetaminophen (NORCO)  mg tablet 0 Sig: Take 1 Tab by mouth every six (6) hours as needed for Pain. Max Daily Amount: 4 Tabs. Indications: sickl;e cell crisis Class: Print Route: Oral  
  
We Performed the Following AMB POC EKG ROUTINE W/ 12 LEADS, INTER & REP [30450 CPT(R)] BNP [id number] CPT(R)] CBC WITH AUTOMATED DIFF [02139 CPT(R)] T4, FREE [id number] CPT(R)] TSH 3RD GENERATION [85101 CPT(R)] Introducing [facility]! Dear Rudolph Comment: 
Thank you for requesting a Valchemy account. Our records indicate that you already have an active Valchemy account. You can access your account anytime at [email or web address] [email or web address]/"[email or web address]" Did you know that you can access your hospital and ER discharge instructions at any time in Valchemy? You can also review all of your test results from your hospital stay or ER visit. Additional Information If you have questions, please visit the Frequently Asked Questions section of the Valchemy website at [email or web address] Remember, Valchemy is NOT to be used for urgent needs. For medical emergencies, dial 911. Now available from your iPhone and Android! Please provide this summary of care documentation to your next provider. Your primary care clinician is listed as Ezequiel Lynne. If you have any questions after today's visit, please call [phone].

## 2018-03-13 NOTE — PROGRESS NOTES
Chief Complaint   Patient presents with    Shortness of Breath     Patient states that she gets short of breath when walking, and can only walk short distances x 1.5 weeks     1. Have you been to the ER, urgent care clinic since your last visit? Hospitalized since your last visit? No    2. Have you seen or consulted any other health care providers outside of the [address] [place] [address] since your last visit? Include any pap smears or colon screening. No     for Norco verified and approved per Dr. Raymundo Waller.

## 2018-03-14 LAB
BASOPHILS # BLD AUTO: 0 X10E3/UL (ref 0–0.2)
BASOPHILS NFR BLD AUTO: 1 %
BNP SERPL-MCNC: 47.2 PG/ML (ref 0–100)
EOSINOPHIL # BLD AUTO: 0.2 X10E3/UL (ref 0–0.4)
EOSINOPHIL NFR BLD AUTO: 3 %
ERYTHROCYTE [DISTWIDTH] IN BLOOD BY AUTOMATED COUNT: 17.2 % (ref 12.3–15.4)
HCT VFR BLD AUTO: 22.1 % (ref 34–46.6)
HGB BLD-MCNC: 7.4 G/DL (ref 11.1–15.9)
IMM GRANULOCYTES # BLD: 0 X10E3/UL (ref 0–0.1)
IMM GRANULOCYTES NFR BLD: 0 %
LYMPHOCYTES # BLD AUTO: 2.5 X10E3/UL (ref 0.7–3.1)
LYMPHOCYTES NFR BLD AUTO: 34 %
MCH RBC QN AUTO: 33.5 PG (ref 26.6–33)
MCHC RBC AUTO-ENTMCNC: 33.5 G/DL (ref 31.5–35.7)
MCV RBC AUTO: 100 FL (ref 79–97)
MONOCYTES # BLD AUTO: 0.9 X10E3/UL (ref 0.1–0.9)
MONOCYTES NFR BLD AUTO: 13 %
NEUTROPHILS # BLD AUTO: 3.7 X10E3/UL (ref 1.4–7)
NEUTROPHILS NFR BLD AUTO: 49 %
PLATELET # BLD AUTO: 215 X10E3/UL (ref 150–379)
RBC # BLD AUTO: 2.21 X10E6/UL (ref 3.77–5.28)
T4 FREE SERPL-MCNC: 1.16 NG/DL (ref 0.82–1.77)
TSH SERPL DL<=0.005 MIU/L-ACNC: 1.36 UIU/ML (ref 0.45–4.5)
WBC # BLD AUTO: 7.4 X10E3/UL (ref 3.4–10.8)

## 2018-04-09 ENCOUNTER — OFFICE VISIT (OUTPATIENT)
Dept: INTERNAL MEDICINE CLINIC | Age: 58
End: 2018-04-09

## 2018-04-09 VITALS
SYSTOLIC BLOOD PRESSURE: 107 MMHG | DIASTOLIC BLOOD PRESSURE: 70 MMHG | RESPIRATION RATE: 16 BRPM | HEART RATE: 87 BPM | HEIGHT: 70 IN | WEIGHT: 189.5 LBS | BODY MASS INDEX: 27.13 KG/M2 | OXYGEN SATURATION: 97 % | TEMPERATURE: 99.3 F

## 2018-04-09 DIAGNOSIS — Z12.39 SCREENING FOR MALIGNANT NEOPLASM OF BREAST: ICD-10-CM

## 2018-04-09 DIAGNOSIS — F32.A DEPRESSION, UNSPECIFIED DEPRESSION TYPE: ICD-10-CM

## 2018-04-09 DIAGNOSIS — I87.2 VENOUS INSUFFICIENCY: ICD-10-CM

## 2018-04-09 DIAGNOSIS — I10 ESSENTIAL HYPERTENSION: ICD-10-CM

## 2018-04-09 DIAGNOSIS — D57.1 HB-SS DISEASE WITHOUT CRISIS (HCC): Primary | ICD-10-CM

## 2018-04-09 NOTE — PROGRESS NOTES
[address] [place] [address] and Primary Care  [facility]  [address] [facility] [address]  Phone:  [phone]  Fax: [phone]       Chief Complaint   Patient presents with   [facility] Results   . SUBJECTIVE:    Cesia Castro is a 56 y.o. female   Comes in for a return visit stating she feels great. She has no complaints for the most part. Her fatigue and shortness of breath she formerly had are abating. This started several days after a crisis. Her hemoglobin was 7.4 which is a new norm. She has occasional pain in her right knee. She has not had any further crises and remains on analgesics which help significantly. She has a past history of primary hypertension and dyslipidemia. Current Outpatient Prescriptions   Medication Sig Dispense Refill    HYDROcodone-acetaminophen (NORCO)  mg tablet Take 1 Tab by mouth every six (6) hours as needed for Pain. Max Daily Amount: 4 Tabs. Indications: sickl;e cell crisis 120 Tab 0    fentaNYL (DURAGESIC) 75 mcg/hr 1 Patch by TransDERmal route every seventy-two (72) hours. Max Daily Amount: 1 Patch. 10 Patch 0    OMEPRAZOLE PO Take 20 mg by mouth.  cyclobenzaprine (FLEXERIL) 10 mg tablet Take 1 Tab by mouth three (3) times daily as needed for Muscle Spasm(s). 50 Tab 1    citalopram (CELEXA) 10 mg tablet TAKE 1 TABLET BY MOUTH EVERY NIGHT AT BEDTIME 90 Tab 3    losartan-hydroCHLOROthiazide (HYZAAR) 50-12.5 mg per tablet TAKE 1 TABLET BY MOUTH DAILY 90 Tab 3    folic acid (FOLVITE) 1 mg tablet TAKE 1 TABLET BY MOUTH EVERY DAY 90 Tab 3    fluocinoNIDE (LIDEX) 0.05 % topical cream two (2) times daily as needed.        Past Medical History:   Diagnosis Date    Anemia     SC hemoglobinopathy    Chronic pain     Depression     Hypertension     Liver disease     hepatitis C; pt reports \"cured\"    Sickle cell disease ([address]      Past Surgical History:   Procedure Laterality Date    BREAST SURGERY PROCEDURE UNLISTED      2 cysts removed from R breast    CHEST SURGERY PROCEDURE UNLISTED      status post thor for removal of a benign     HX CHOLECYSTECTOMY      HX ORTHOPAEDIC      bony curettage of an ostial myelitis focus     Allergies   Allergen Reactions    Dilaudid [Hydromorphone (Bulk)] Itching     Can tolerate with benadryl and ondansetron    Percocet [Oxycodone-Acetaminophen] Itching         REVIEW OF SYSTEMS:  General: negative for - chills or fever  ENT: negative for - headaches, nasal congestion or tinnitus  Respiratory: negative for - cough, hemoptysis, shortness of breath or wheezing  Cardiovascular : negative for - chest pain, edema, palpitations or shortness of breath  Gastrointestinal: negative for - abdominal pain, blood in stools, heartburn or nausea/vomiting  Genito-Urinary: no dysuria, trouble voiding, or hematuria  Musculoskeletal: negative for - gait disturbance, joint pain, joint stiffness or joint swelling  Neurological: no TIA or stroke symptoms  Hematologic: no bruises, no bleeding, no swollen glands  Integument: no lumps, mole changes, nail changes or rash  Endocrine: no malaise/lethargy or unexpected weight changes      Social History     Social History    Marital status:      Spouse name: N/A    Number of children: 2    Years of education: N/A     Occupational History    disabled---Sickle cell disease      Social History Main Topics    Smoking status: Never Smoker    Smokeless tobacco: Never Used    Alcohol use No    Drug use: No    Sexual activity: Yes     Partners: Male     Other Topics Concern    None     Social History Narrative     Family History   Problem Relation Age of Onset    Hypertension Mother     Hypertension Father        OBJECTIVE:    Visit Vitals    /70 (BP 1 Location: Left arm, BP Patient Position: Sitting)    Pulse 87    Temp 99.3 °F (37.4 °C) (Oral)    Resp 16    Ht 5' 10\" (1.778 m)    Wt 189 lb 8 oz (86 kg)    SpO2 97%    BMI 27.19 kg/m2     CONSTITUTIONAL: well , well nourished, appears age appropriate  EYES: perrla, eom intact  ENMT:moist mucous membranes, pharynx clear  NECK: supple. Thyroid normal  RESPIRATORY: Chest: clear to ascultation and percussion   CARDIOVASCULAR: Heart: regular rate and rhythm  GASTROINTESTINAL: Abdomen: soft, bowel sounds active  HEMATOLOGIC: no pathological lymph nodes palpated  MUSCULOSKELETAL: Extremities: no edema, pulse 1+   INTEGUMENT: No unusual rashes or suspicious skin lesions noted. Nails appear normal.  NEUROLOGIC: non-focal exam   MENTAL STATUS: alert and oriented, appropriate affect      ASSESSMENT:  1. Hb-SS disease without crisis ([address]    2. Essential hypertension    3. Venous insufficiency    4. Depression, unspecified depression type    5. Screening for malignant neoplasm of breast        PLAN:    1. As far as the fatigue and shortness of breath I am not entirely sure of the etiology. This is a post crisis symptom complex which resolved. Her lab work was stable. She is better now so nothing more need to be done. 2.  There is no evidence of arthritis of her joints particularly her knees. She does have a mild genu valgus deformity present. 3.  She will continue analgesics for her sickle cell disease to abort crises. 4.  Blood pressure is excellent today, no adjustments made. 5.  She is no longer depressed. Follow-up Disposition:  Return in about 3 months (around 7/9/2018).       Tobi Smith MD

## 2018-04-09 NOTE — PROGRESS NOTES
Chief Complaint   Patient presents with    Results     1. Have you been to the ER, urgent care clinic since your last visit? Hospitalized since your last visit? No    2. Have you seen or consulted any other health care providers outside of the [address] [place] [address] since your last visit? Include any pap smears or colon screening.  No

## 2018-04-09 NOTE — MR AVS SNAPSHOT
[address] [place] [address] [address] [address] 
[phone] Patient: Yun Rendon MRN: [id number] :1960 Visit Information Date & Time Provider Department Dept. Phone Encounter #  
 2018 12:00 PM Enrico Romero MD [facility] Sports Medicine and [address] [id number] Upcoming Health Maintenance Date Due  
 BREAST CANCER SCRN MAMMOGRAM 2017 MEDICARE YEARLY EXAM 3/14/2018 PAP AKA CERVICAL CYTOLOGY 2018 FOBT Q 1 YEAR AGE 50-75 2020* Pneumococcal 19-64 Highest Risk (3 of 3 - PCV13) 10/31/2018 DTaP/Tdap/Td series (2 - Td) 2026 *Topic was postponed. The date shown is not the original due date. Allergies as of 2018  Review Complete On: 2018 By: Sarahi Cueva Severity Noted Reaction Type Reactions Dilaudid [Hydromorphone (Bulk)]  10/03/2014    Itching Can tolerate with benadryl and ondansetron Percocet [Oxycodone-acetaminophen]  10/03/2014    Itching Current Immunizations  Reviewed on 2017 Name Date Influenza Vaccine (Quad) PF 10/31/2017 Not reviewed this visit You Were Diagnosed With   
  
 Codes Comments Screening for malignant neoplasm of breast    -  Primary ICD-10-CM: Z12.31 
ICD-9-CM: V76.10 Vitals BP Pulse Temp Resp Height(growth percentile) Weight(growth percentile) 107/70 (BP 1 Location: Left arm, BP Patient Position: Sitting) 87 99.3 °F (37.4 °C) (Oral) 16 5' 10\" (1.778 m) 189 lb 8 oz (86 kg) SpO2 BMI OB Status Smoking Status 97% 27.19 kg/m2 Postmenopausal Never Smoker Vitals History BMI and BSA Data Body Mass Index Body Surface Area  
 27.19 kg/m 2 2.06 m 2 Preferred Pharmacy Pharmacy Name Phone [address] [phone] Your Updated Medication List  
  
 This list is accurate as of 4/9/18 12:47 PM.  Always use your most recent med list.  
  
  
  
  
 citalopram 10 mg tablet Commonly known as:  CELEXA  
TAKE 1 TABLET BY MOUTH EVERY NIGHT AT BEDTIME  
  
 cyclobenzaprine 10 mg tablet Commonly known as:  FLEXERIL Take 1 Tab by mouth three (3) times daily as needed for Muscle Spasm(s). fentaNYL 75 mcg/hr Commonly known as:  DURAGESIC  
1 Patch by TransDERmal route every seventy-two (72) hours. Max Daily Amount: 1 Patch. fluocinoNIDE 0.05 % topical cream  
Commonly known as:  LIDEX  
two (2) times daily as needed. folic acid 1 mg tablet Commonly known as:  FOLVITE  
TAKE 1 TABLET BY MOUTH EVERY DAY  
  
 HYDROcodone-acetaminophen  mg tablet Commonly known as:  Consuelo Boris Take 1 Tab by mouth every six (6) hours as needed for Pain. Max Daily Amount: 4 Tabs. Indications: sickl;e cell crisis  
  
 losartan-hydroCHLOROthiazide 50-12.5 mg per tablet Commonly known as:  HYZAAR  
TAKE 1 TABLET BY MOUTH DAILY  
  
 OMEPRAZOLE PO Take 20 mg by mouth. Introducing [address]! Dear Renny Jc: 
Thank you for requesting a FAB [employer]AG account. Our records indicate that you already have an active FAB BAG account. You can access your account anytime at [email or web address] [employer] Did you know that you can access your hospital and ER discharge instructions at any time in FAB BAG? You can also review all of your test results from your hospital stay or ER visit. Additional Information If you have questions, please visit the Frequently Asked Questions section of the FAB [employer]AG website at [email or web address] [employer]/. Remember, FAB BAG is NOT to be used for urgent needs. For medical emergencies, dial 911. Now available from your iPhone and Android! Please provide this summary of care documentation to your next provider. Your primary care clinician is listed as Ezequiel Lynne.  If you have any questions after today's visit, please call [phone].

## 2018-04-17 DIAGNOSIS — D57.1 HB-SS DISEASE WITHOUT CRISIS (HCC): ICD-10-CM

## 2018-04-17 DIAGNOSIS — D57.00 SICKLE CELL CRISIS (HCC): ICD-10-CM

## 2018-04-17 RX ORDER — FENTANYL 75 UG/H
1 PATCH TRANSDERMAL
Qty: 10 PATCH | Refills: 0 | Status: SHIPPED | OUTPATIENT
Start: 2018-04-17 | End: 2018-05-15 | Stop reason: SDUPTHER

## 2018-04-17 RX ORDER — HYDROCODONE BITARTRATE AND ACETAMINOPHEN 10; 325 MG/1; MG/1
1 TABLET ORAL
Qty: 120 TAB | Refills: 0 | Status: SHIPPED | OUTPATIENT
Start: 2018-04-17 | End: 2018-05-15 | Stop reason: SDUPTHER

## 2018-04-18 ENCOUNTER — DOCUMENTATION ONLY (OUTPATIENT)
Dept: INTERNAL MEDICINE CLINIC | Age: 58
End: 2018-04-18

## 2018-05-03 ENCOUNTER — HOSPITAL ENCOUNTER (OUTPATIENT)
Dept: MAMMOGRAPHY | Age: 58
Discharge: HOME | End: 2018-05-03
Payer: MEDICARE

## 2018-05-03 DIAGNOSIS — Z12.39 SCREENING FOR MALIGNANT NEOPLASM OF BREAST: ICD-10-CM

## 2018-05-03 PROCEDURE — 77063 BREAST TOMOSYNTHESIS BI: CPT

## 2018-05-03 PROCEDURE — 77067 SCR MAMMO BI INCL CAD: CPT

## 2018-05-15 DIAGNOSIS — D57.1 HB-SS DISEASE WITHOUT CRISIS (HCC): ICD-10-CM

## 2018-05-15 DIAGNOSIS — D57.00 SICKLE CELL CRISIS (HCC): ICD-10-CM

## 2018-05-16 RX ORDER — HYDROCODONE BITARTRATE AND ACETAMINOPHEN 10; 325 MG/1; MG/1
1 TABLET ORAL
Qty: 120 TAB | Refills: 0 | Status: SHIPPED | OUTPATIENT
Start: 2018-05-16 | End: 2018-06-18 | Stop reason: SDUPTHER

## 2018-05-16 RX ORDER — FENTANYL 75 UG/H
1 PATCH TRANSDERMAL
Qty: 10 PATCH | Refills: 0 | Status: SHIPPED | OUTPATIENT
Start: 2018-05-16 | End: 2018-07-09 | Stop reason: SDUPTHER

## 2018-06-12 ENCOUNTER — OFFICE VISIT (OUTPATIENT)
Dept: INTERNAL MEDICINE CLINIC | Age: 58
End: 2018-06-12

## 2018-06-12 VITALS
WEIGHT: 195.5 LBS | HEIGHT: 70 IN | SYSTOLIC BLOOD PRESSURE: 110 MMHG | TEMPERATURE: 99.2 F | HEART RATE: 95 BPM | BODY MASS INDEX: 27.99 KG/M2 | OXYGEN SATURATION: 96 % | DIASTOLIC BLOOD PRESSURE: 73 MMHG | RESPIRATION RATE: 16 BRPM

## 2018-06-12 DIAGNOSIS — G56.01 CARPAL TUNNEL SYNDROME OF RIGHT WRIST: Primary | ICD-10-CM

## 2018-06-12 DIAGNOSIS — I10 ESSENTIAL HYPERTENSION: ICD-10-CM

## 2018-06-12 DIAGNOSIS — B18.2 CHRONIC HEPATITIS C WITHOUT HEPATIC COMA (HCC): ICD-10-CM

## 2018-06-12 DIAGNOSIS — D57.1 HB-SS DISEASE WITHOUT CRISIS (HCC): ICD-10-CM

## 2018-06-12 RX ORDER — FOLIC ACID 1 MG/1
1 TABLET ORAL DAILY
Qty: 30 TAB | Refills: 11 | Status: SHIPPED | OUTPATIENT
Start: 2018-06-12 | End: 2019-01-10 | Stop reason: SDUPTHER

## 2018-06-12 NOTE — MR AVS SNAPSHOT
[address] [facility] 
 
 
 [address] [address] 
[phone] Patient: Jovany Curran MRN: [id number] :1960 Visit Information Date & Time Provider Department Dept. Phone Encounter #  
 2018 12:00 PM Serg Blanchard MD [employer] [place] [employer] [facility] and [address] [id number] Your Appointments 2018 10:45 AM  
Any with Serg Blanchard MD  
[address] [place] [address] and Primary Care [address] [place] [address]) Appt Note: 3 month follow up  
 [address] 1 [facility]  
  
   
 [address] [phone] Upcoming Health Maintenance Date Due  
 MEDICARE YEARLY EXAM 3/14/2018 PAP AKA CERVICAL CYTOLOGY 2018 FOBT Q 1 YEAR AGE 50-75 2020* Influenza Age 5 to Adult 2018 Pneumococcal 19-64 Highest Risk (3 of 3 - PCV13) 10/31/2018 BREAST CANCER SCRN MAMMOGRAM 5/3/2020 DTaP/Tdap/Td series (2 - Td) 2026 *Topic was postponed. The date shown is not the original due date. Allergies as of 2018  Review Complete On: 2018 By: Betty Smith Severity Noted Reaction Type Reactions Dilaudid [Hydromorphone (Bulk)]  10/03/2014    Itching Can tolerate with benadryl and ondansetron Percocet [Oxycodone-acetaminophen]  10/03/2014    Itching Current Immunizations  Reviewed on 2017 Name Date Influenza Vaccine (Quad) PF 10/31/2017 Not reviewed this visit Vitals BP Pulse Temp Resp Height(growth percentile) Weight(growth percentile) 110/73 95 99.2 °F (37.3 °C) (Oral) 16 5' 10\" (1.778 m) 195 lb 8 oz (88.7 kg) SpO2 BMI OB Status Smoking Status 96% 28.05 kg/m2 Postmenopausal Never Smoker Vitals History BMI and BSA Data Body Mass Index Body Surface Area 28.05 kg/m 2 2.09 m 2 Preferred Pharmacy Pharmacy Name Phone [address]  [phone] Your Updated Medication List  
  
   
This list is accurate as of 6/12/18  1:55 PM.  Always use your most recent med list.  
  
  
  
  
 citalopram 10 mg tablet Commonly known as:  CELEXA  
TAKE 1 TABLET BY MOUTH EVERY NIGHT AT BEDTIME  
  
 cyclobenzaprine 10 mg tablet Commonly known as:  FLEXERIL Take 1 Tab by mouth three (3) times daily as needed for Muscle Spasm(s). fentaNYL 75 mcg/hr Commonly known as:  DURAGESIC  
1 Patch by TransDERmal route every seventy-two (72) hours. Max Daily Amount: 1 Patch. fluocinoNIDE 0.05 % topical cream  
Commonly known as:  LIDEX  
two (2) times daily as needed. folic acid 1 mg tablet Commonly known as:  FOLVITE  
TAKE 1 TABLET BY MOUTH EVERY DAY  
  
 HYDROcodone-acetaminophen  mg tablet Commonly known as:  Benetta Pock Take 1 Tab by mouth every six (6) hours as needed for Pain. Max Daily Amount: 4 Tabs. Indications: sickl;e cell crisis  
  
 losartan-hydroCHLOROthiazide 50-12.5 mg per tablet Commonly known as:  HYZAAR  
TAKE 1 TABLET BY MOUTH DAILY  
  
 OMEPRAZOLE PO Take 20 mg by mouth. Introducing \[facility]\"" & HEALTH SERVICES! Dear Cedrick Duran: 
Thank you for requesting a BDNA account. Our records indicate that you already have an active BDNA account. You can access your account anytime at [email or web address]. [email or web address] Did you know that you can access your hospital and ER discharge instructions at any time in BDNA? You can also review all of your test results from your hospital stay or ER visit. Additional Information If you have questions, please visit the Frequently Asked Questions section of the BDNA website at [email or web address] [email or web address]/. Remember, BDNA is NOT to be used for urgent needs. For medical emergencies, dial 911. Now available from your iPhone and Android! Please provide this summary of care documentation to your next provider. Your primary care clinician is listed as Ezequiel Lynne. If you have any questions after today's visit, please call [phone].

## 2018-06-12 NOTE — PROGRESS NOTES
[address] [facility]  [place] [address]  [address] [facility] [address]  Phone:  [phone]  Fax: [phone]       Chief Complaint   Patient presents with    Carpal Tunnel     right hand; burning sensation   . SUBJECTIVE:    Lilliana Boland is a 56 y.o. female   Comes in for a return visit stating that she has done well other than paresthesias and numbness of her hand which is rather recalcitrant. She has a known history of carpal tunnel syndrome. This has reached the point where it is interfering with her ability to sleep. She does not wish to have surgical intervention. Her sickle cell disease has been reasonably stable with continued use of analgesic. She has a past history of primary hypertension and depression, both of which are stable. She has to go to [facility] gastroenterology twice a year for follow up. Apparently, the medication that was given her Clarence to eradicate her hepatitis C has been associated rarely with liver cancer. Current Outpatient Prescriptions   Medication Sig Dispense Refill    folic acid (FOLVITE) 1 mg tablet Take 1 Tab by mouth daily. 30 Tab 11    triamcinolone acetonide (KENALOG) 10 mg/mL injection 2 mL by IntraMUSCular route once for 1 dose. 1 Vial 0    HYDROcodone-acetaminophen (NORCO)  mg tablet Take 1 Tab by mouth every six (6) hours as needed for Pain. Max Daily Amount: 4 Tabs. Indications: sickl;e cell crisis 120 Tab 0    fentaNYL (DURAGESIC) 75 mcg/hr 1 Patch by TransDERmal route every seventy-two (72) hours. Max Daily Amount: 1 Patch. 10 Patch 0    OMEPRAZOLE PO Take 20 mg by mouth.  citalopram (CELEXA) 10 mg tablet TAKE 1 TABLET BY MOUTH EVERY NIGHT AT BEDTIME 90 Tab 3    losartan-hydroCHLOROthiazide (HYZAAR) 50-12.5 mg per tablet TAKE 1 TABLET BY MOUTH DAILY 90 Tab 3    fluocinoNIDE (LIDEX) 0.05 % topical cream two (2) times daily as needed.       cyclobenzaprine (FLEXERIL) 10 mg tablet Take 1 Tab by mouth three (3) times daily as needed for Muscle Spasm(s).  48 Tab 1     Past Medical History:   Diagnosis Date    Anemia     SC hemoglobinopathy    Chronic pain     Depression     Hypertension     Liver disease     hepatitis C; pt reports \"cured\"    Sickle cell disease ([facility] [address]      Past Surgical History:   Procedure Laterality Date    BREAST SURGERY PROCEDURE UNLISTED      2 cysts removed from R breast    CHEST SURGERY PROCEDURE UNLISTED      status post thor for removal of a benign     HX BREAST BIOPSY Right 05/2007    negative    HX CHOLECYSTECTOMY      HX ORTHOPAEDIC      bony curettage of an ostial myelitis focus     Allergies   Allergen Reactions    Dilaudid [Hydromorphone (Bulk)] Itching     Can tolerate with benadryl and ondansetron    Percocet [Oxycodone-Acetaminophen] Itching         REVIEW OF SYSTEMS:  General: negative for - chills or fever  ENT: negative for - headaches, nasal congestion or tinnitus  Respiratory: negative for - cough, hemoptysis, shortness of breath or wheezing  Cardiovascular : negative for - chest pain, edema, palpitations or shortness of breath  Gastrointestinal: negative for - abdominal pain, blood in stools, heartburn or nausea/vomiting  Genito-Urinary: no dysuria, trouble voiding, or hematuria  Musculoskeletal: negative for - gait disturbance, joint pain, joint stiffness or joint swelling  Neurological: no TIA or stroke symptoms  Hematologic: no bruises, no bleeding, no swollen glands  Integument: no lumps, mole changes, nail changes or rash  Endocrine: no malaise/lethargy or unexpected weight changes      Social History     Social History    Marital status:      Spouse name: N/A    Number of children: 2    Years of education: N/A     Occupational History    disabled---Sickle cell disease      Social History Main Topics    Smoking status: Never Smoker    Smokeless tobacco: Never Used    Alcohol use No    Drug use: No    Sexual activity: Yes     Partners: Male Other Topics Concern    None     Social History Narrative     Family History   Problem Relation Age of Onset    Hypertension Mother     Hypertension Father        OBJECTIVE:    Visit Vitals    /73    Pulse 95    Temp 99.2 °F (37.3 °C) (Oral)    Resp 16    Ht 5' 10\" (1.778 m)    Wt 195 lb 8 oz (88.7 kg)    SpO2 96%    BMI 28.05 kg/m2     CONSTITUTIONAL: well , well nourished, appears age appropriate  EYES: perrla, eom intact  ENMT:moist mucous membranes, pharynx clear  NECK: supple. Thyroid normal  RESPIRATORY: Chest: clear to ascultation and percussion   CARDIOVASCULAR: Heart: regular rate and rhythm  GASTROINTESTINAL: Abdomen: soft, bowel sounds active  HEMATOLOGIC: no pathological lymph nodes palpated  MUSCULOSKELETAL: Extremities: no edema, pulse 1+   INTEGUMENT: No unusual rashes or suspicious skin lesions noted. Nails appear normal.  NEUROLOGIC: non-focal exam   MENTAL STATUS: alert and oriented, appropriate affect      ASSESSMENT:  1. Carpal tunnel syndrome of right wrist    2. Hb-SS disease without crisis ([address]    3. Essential hypertension    4. Chronic hepatitis C without hepatic coma (HCC)        PLAN:    1. The patient has symptomatic carpal tunnel syndrome in the right hand. Under sterile technique, I injected 20 mg of Kenalog into the carpal tunnel. She tolerates the procedure well. 2. Sickle cell disease. Reasonably stable and she can order her analgesic next week. 3. Blood pressure excellent, no adjustments made. 4. She will follow up with [facility] gastroenterology for hepatitis C in view of the comments I made earlier. .  Orders Placed This Encounter    folic acid (FOLVITE) 1 mg tablet    triamcinolone acetonide (KENALOG) 10 mg/mL injection         Follow-up Disposition:  Return in about 3 months (around 9/12/2018).       Emigdio Raman MD

## 2018-06-12 NOTE — PROGRESS NOTES
Chief Complaint   Patient presents with    Carpal Tunnel     right hand; burning sensation     1. Have you been to the ER, urgent care clinic since your last visit? Hospitalized since your last visit? No    2. Have you seen or consulted any other health care providers outside of the [address] [place] [address] since your last visit? Include any pap smears or colon screening.  No

## 2018-06-18 DIAGNOSIS — D57.1 HB-SS DISEASE WITHOUT CRISIS (HCC): ICD-10-CM

## 2018-06-19 ENCOUNTER — TELEPHONE (OUTPATIENT)
Dept: INTERNAL MEDICINE CLINIC | Age: 58
End: 2018-06-19

## 2018-06-19 RX ORDER — HYDROCODONE BITARTRATE AND ACETAMINOPHEN 10; 325 MG/1; MG/1
1 TABLET ORAL
Qty: 120 TAB | Refills: 0 | Status: SHIPPED | OUTPATIENT
Start: 2018-06-19 | End: 2018-07-27

## 2018-07-09 ENCOUNTER — OFFICE VISIT (OUTPATIENT)
Dept: INTERNAL MEDICINE CLINIC | Age: 58
End: 2018-07-09

## 2018-07-09 VITALS
RESPIRATION RATE: 17 BRPM | HEART RATE: 69 BPM | SYSTOLIC BLOOD PRESSURE: 113 MMHG | HEIGHT: 70 IN | DIASTOLIC BLOOD PRESSURE: 70 MMHG | TEMPERATURE: 98.8 F | OXYGEN SATURATION: 100 % | WEIGHT: 191 LBS | BODY MASS INDEX: 27.35 KG/M2

## 2018-07-09 DIAGNOSIS — D57.00 SICKLE CELL CRISIS (HCC): Primary | ICD-10-CM

## 2018-07-09 DIAGNOSIS — I10 ESSENTIAL HYPERTENSION: ICD-10-CM

## 2018-07-09 DIAGNOSIS — F32.A DEPRESSION, UNSPECIFIED DEPRESSION TYPE: ICD-10-CM

## 2018-07-09 RX ORDER — FENTANYL 75 UG/H
1 PATCH TRANSDERMAL
Qty: 10 PATCH | Refills: 0 | Status: SHIPPED | OUTPATIENT
Start: 2018-07-09 | End: 2018-07-13 | Stop reason: SDUPTHER

## 2018-07-09 NOTE — PROGRESS NOTES
Chief Complaint   Patient presents with    Hypertension   . SUBECTIVE:    Hebert Cabello is a 56 y.o. female She comes in for a return visit stating that she has done fairly well except for the last three or four days where she has had increasing pain in her back and arms. She has been taking her analgesics, which help. She has a past history of primary hypertension and depression. Both of these are reasonably stable. Current Outpatient Prescriptions   Medication Sig Dispense Refill    ondansetron (ZOFRAN ODT) 4 mg disintegrating tablet Take 1 Tab by mouth every eight (8) hours as needed for Nausea. 10 Tab 0    promethazine (PHENERGAN) 25 mg suppository Insert 1 Suppository into rectum every six (6) hours as needed for Nausea.  12 Suppository 0     Facility-Administered Medications Ordered in Other Visits   Medication Dose Route Frequency Provider Last Rate Last Dose    citalopram (CELEXA) tablet 10 mg  10 mg Oral QPM Flory Barone MD   10 mg at 07/15/18 1758    [START ON 8/93/8741] folic acid (FOLVITE) tablet 1 mg  1 mg Oral DAILY Flory Barone MD        sodium chloride (NS) flush 5-10 mL  5-10 mL IntraVENous Karina Carrillo MD   10 mL at 07/15/18 1537    sodium chloride (NS) flush 5-10 mL  5-10 mL IntraVENous PRN Flory Barone MD        heparin (porcine) injection 5,000 Units  5,000 Units SubCUTAneous Karina Carrillo MD   5,000 Units at 07/15/18 1536    ondansetron (ZOFRAN) injection 4 mg  4 mg IntraVENous Q6H PRN Flory Barone MD   4 mg at 07/15/18 1212    promethazine (PHENERGAN) 12.5 mg in 0.9% sodium chloride 50 mL IVPB  12.5 mg IntraVENous Q4H PRN Flory Barone MD        0.9% sodium chloride with KCl 20 mEq/L infusion   IntraVENous CONTINUOUS Flory Barone  mL/hr at 07/15/18 1436      diphenhydrAMINE (BENADRYL) 12.5 mg/5 mL oral elixir 25 mg  25 mg Oral Q6H PRN Flory Barone MD   25 mg at 07/15/18 1222    HYDROcodone-acetaminophen (NORCO)  mg tablet 1 Tab  1 Tab Oral Q4H PRN Sheridan Mendez MD        HYDROmorphone (PF) (DILAUDID) injection 1 mg  1 mg IntraVENous Q3H PRN Sheridan Mendez MD   1 mg at 07/15/18 1759     Past Medical History:   Diagnosis Date    Anemia     SC hemoglobinopathy    Chronic pain     Depression     Hypertension     Liver disease     hepatitis C; pt reports \"cured\"    Sickle cell disease ([facility] [address]      Past Surgical History:   Procedure Laterality Date    BREAST SURGERY PROCEDURE UNLISTED      2 cysts removed from R breast    CHEST SURGERY PROCEDURE UNLISTED      status post thor for removal of a benign     HX BREAST BIOPSY Right 05/2007    negative    HX CHOLECYSTECTOMY      HX ORTHOPAEDIC      bony curettage of an ostial myelitis focus     Allergies   Allergen Reactions    Dilaudid [Hydromorphone (Bulk)] Itching     Can tolerate with benadryl and ondansetron    Percocet [Oxycodone-Acetaminophen] Itching       REVIEW OF SYSTEMS:  Review of Systems - Negative except   ENT ROS: negative for - headaches, hearing change, nasal congestion, oral lesions, tinnitus, visual changes or   Respiratory ROS: no cough, shortness of breath, or wheezing  Cardiovascular ROS: no chest pain or dyspnea on exertion  Gastrointestinal ROS: no abdominal pain, change in bowel habits, or black or blood  Genito-Urinary ROS: no dysuria, trouble voiding, or hematuria  Musculoskeletal ROS: negative  Neurological ROS: no TIA or stroke symptoms      Social History     Social History    Marital status:      Spouse name: N/A    Number of children: 2    Years of education: N/A     Occupational History    disabled---Sickle cell disease      Social History Main Topics    Smoking status: Never Smoker    Smokeless tobacco: Never Used    Alcohol use No    Drug use: No    Sexual activity: Yes     Partners: Male     Other Topics Concern    None     Social History Narrative   r  Family History Problem Relation Age of Onset    Hypertension Mother     Hypertension Father        OBJECTIVE:  Visit Vitals    /70 (BP 1 Location: Left arm, BP Patient Position: Sitting)    Pulse 69    Temp 98.8 °F (37.1 °C) (Oral)    Resp 17    Ht 5' 10\" (1.778 m)    Wt 191 lb (86.6 kg)    SpO2 100%    BMI 27.41 kg/m2     ENT: perrla,  eom intact  NECK: supple. Thyroid normal, no JVD  CHEST: clear to ascultation and percussion   HEART: regular rate and rhythm  ABD: soft, bowel sounds active,   EXTREMITIES: no edema, pulse 1+  INTEGUMENT: clear      ASSESSMENT:  1. Sickle cell crisis ([address]    2. Essential hypertension    3. Depression, unspecified depression type        PLAN:    1. She will be given a refill of her Fentanyl patch today. She will continue Hydrocortisone as prescribed. Hopefully, the current crisis will resolve without more aggressive intervention. 2. Blood pressure is excellent today. No adjustments are made. 3. Depression is quite stable. .  Orders Placed This Encounter    5-DRUG SCREEN, UR    DISCONTD: fentaNYL ([address]) 75 mcg/hr       Follow-up Disposition:  Return in about 3 months (around 10/9/2018).       Emigdio Raman MD

## 2018-07-09 NOTE — PROGRESS NOTES
Chief Complaint   Patient presents with    Hypertension       1. Have you been to the ER, urgent care clinic since your last visit? Hospitalized since your last visit? No    2. Have you seen or consulted any other health care providers outside of the [facility] since your last visit? Include any pap smears or colon screening. No    Body mass index is 27.41 kg/(m^2).

## 2018-07-09 NOTE — MR AVS SNAPSHOT
[facility] 
 
 
 [id number] 
[phone] Patient: Radu Quarles MRN: [id number] :1960 Visit Information Date & Time Provider Department Dept. Phone Encounter #  
 2018 10:45 AM Yudith Pickens [facility] Medicine and Primary Care [phone] [id number] Your Appointments 10/16/2018 10:30 AM  
Any with Era Can MD  
[address] [place] [address] and Primary Care [address] [place] [address]) Appt Note: 3 Month Follow Up  
 [address] [address] [phone] Upcoming Health Maintenance Date Due  
 MEDICARE YEARLY EXAM 3/14/2018 PAP AKA CERVICAL CYTOLOGY 2018 FOBT Q 1 YEAR AGE 50-75 2020* Influenza Age 5 to Adult 2018 Pneumococcal 19-64 Highest Risk (3 of 3 - PCV13) 10/31/2018 BREAST CANCER SCRN MAMMOGRAM 5/3/2020 DTaP/Tdap/Td series (2 - Td) 2026 *Topic was postponed. The date shown is not the original due date. Allergies as of 2018  Review Complete On: 2018 By: Daron Abrams LPN Severity Noted Reaction Type Reactions Dilaudid [Hydromorphone (Bulk)]  10/03/2014    Itching Can tolerate with benadryl and ondansetron Percocet [Oxycodone-acetaminophen]  10/03/2014    Itching Current Immunizations  Reviewed on 2017 Name Date Influenza Vaccine (Quad) PF 10/31/2017 Not reviewed this visit You Were Diagnosed With   
  
 Codes Comments Sickle cell crisis ([facility] [address]    -  Primary ICD-10-CM: D57.00 ICD-9-CM: 282.62 Essential hypertension     ICD-10-CM: I10 
ICD-9-CM: 401.9 Depression, unspecified depression type     ICD-10-CM: F32.9 ICD-9-CM: 632 Vitals BP Pulse Temp Resp Height(growth percentile) Weight(growth percentile)  113/70 (BP 1 Location: Left arm, BP Patient Position: Sitting) 69 98.8 °F (37.1 °C) (Oral) 17 5' 10\" (1.778 m) 191 lb (86.6 kg) SpO2 BMI OB Status Smoking Status 100% 27.41 kg/m2 Postmenopausal Never Smoker Vitals History BMI and BSA Data Body Mass Index Body Surface Area  
 27.41 kg/m 2 2.07 m 2 Preferred Pharmacy Pharmacy Name Phone [facility] [phone] Your Updated Medication List  
  
   
This list is accurate as of 18 12:28 PM.  Always use your most recent med list.  
  
  
  
  
 citalopram 10 mg tablet Commonly known as:  CELEXA  
TAKE 1 TABLET BY MOUTH EVERY NIGHT AT BEDTIME  
  
 cyclobenzaprine 10 mg tablet Commonly known as:  FLEXERIL Take 1 Tab by mouth three (3) times daily as needed for Muscle Spasm(s). fentaNYL 75 mcg/hr Commonly known as:  DURAGESIC  
1 Patch by TransDERmal route every seventy-two (72) hours. Max Daily Amount: 1 Patch. fluocinoNIDE 0.05 % topical cream  
Commonly known as:  LIDEX  
two (2) times daily as needed. folic acid 1 mg tablet Commonly known as:  Google Take 1 Tab by mouth daily. HYDROcodone-acetaminophen  mg tablet Commonly known as:  Jennifer Epifanio Take 1 Tab by mouth every six (6) hours as needed for Pain. Max Daily Amount: 4 Tabs. Indications: sickl;e cell crisis  
  
 losartan-hydroCHLOROthiazide 50-12.5 mg per tablet Commonly known as:  HYZAAR  
TAKE 1 TABLET BY MOUTH DAILY  
  
 OMEPRAZOLE PO Take 20 mg by mouth. Prescriptions Printed Refills  
 fentaNYL (DURAGESIC) 75 mcg/hr 0 Si Patch by TransDERmal route every seventy-two (72) hours. Max Daily Amount: 1 Patch. Class: Print Route: TransDERmal  
  
We Performed the Following 5-DRUG SCREEN, UR [id number] Custom] Introducing [facility] & HEALTH SERVICES! Dear Carlos Mendoza: 
Thank you for requesting a [employer] account.   Our records indicate that you already have an active [employer] account. You can access your account anytime at [email or web address] Did you know that you can access your hospital and ER discharge instructions at any time in [employer]? You can also review all of your test results from your hospital stay or ER visit. Additional Information If you have questions, please visit the Frequently Asked Questions section of the [employer] website at [email or web address] Remember, [employer] is NOT to be used for urgent needs. For medical emergencies, dial 911. Now available from your iPhone and Android! Please provide this summary of care documentation to your next provider. Your primary care clinician is listed as Ezequiel Lynne. If you have any questions after today's visit, please call [phone].

## 2018-07-10 LAB
AMPHETAMINES UR QL SCN: NEGATIVE NG/ML
BZE UR QL: NEGATIVE NG/ML
CANNABINOIDS UR QL SCN: NEGATIVE NG/ML
DRUG SCREEN COMMENT:, 753798: ABNORMAL
OPIATES UR QL SCN: POSITIVE NG/ML
PCP UR QL: NEGATIVE NG/ML

## 2018-07-13 DIAGNOSIS — D57.00 SICKLE CELL CRISIS (HCC): ICD-10-CM

## 2018-07-13 RX ORDER — FENTANYL 75 UG/H
1 PATCH TRANSDERMAL
Qty: 10 PATCH | Refills: 0 | Status: SHIPPED | OUTPATIENT
Start: 2018-07-13 | End: 2018-09-05 | Stop reason: SDUPTHER

## 2018-07-15 ENCOUNTER — APPOINTMENT (OUTPATIENT)
Dept: CT IMAGING | Age: 58
DRG: 811 | End: 2018-07-15
Attending: EMERGENCY MEDICINE
Payer: MEDICARE

## 2018-07-15 ENCOUNTER — HOSPITAL ENCOUNTER (INPATIENT)
Age: 58
LOS: 10 days | Discharge: HOME | DRG: 811 | End: 2018-07-27
Attending: EMERGENCY MEDICINE | Admitting: INTERNAL MEDICINE
Payer: MEDICARE

## 2018-07-15 DIAGNOSIS — D57.00 HB-SS DISEASE WITH CRISIS (HCC): ICD-10-CM

## 2018-07-15 DIAGNOSIS — E86.0 DEHYDRATION: ICD-10-CM

## 2018-07-15 DIAGNOSIS — R11.2 INTRACTABLE VOMITING WITH NAUSEA, UNSPECIFIED VOMITING TYPE: Primary | ICD-10-CM

## 2018-07-15 DIAGNOSIS — N28.9 ACUTE RENAL INSUFFICIENCY: ICD-10-CM

## 2018-07-15 LAB
ALBUMIN SERPL-MCNC: 4.5 G/DL (ref 3.5–5)
ALBUMIN/GLOB SERPL: 0.8 {RATIO} (ref 1.1–2.2)
ALP SERPL-CCNC: 91 U/L (ref 45–117)
ALT SERPL-CCNC: 17 U/L (ref 12–78)
AMPHET UR QL SCN: NEGATIVE
ANION GAP SERPL CALC-SCNC: 14 MMOL/L (ref 5–15)
APPEARANCE UR: CLEAR
AST SERPL-CCNC: 34 U/L (ref 15–37)
BACTERIA URNS QL MICRO: NEGATIVE /HPF
BARBITURATES UR QL SCN: NEGATIVE
BASOPHILS # BLD: 0 K/UL (ref 0–0.1)
BASOPHILS NFR BLD: 1 % (ref 0–1)
BENZODIAZ UR QL: NEGATIVE
BILIRUB SERPL-MCNC: 2.8 MG/DL (ref 0.2–1)
BILIRUB UR QL: NEGATIVE
BUN SERPL-MCNC: 14 MG/DL (ref 6–20)
BUN/CREAT SERPL: 8 (ref 12–20)
CALCIUM SERPL-MCNC: 9.6 MG/DL (ref 8.5–10.1)
CANNABINOIDS UR QL SCN: NEGATIVE
CHLORIDE SERPL-SCNC: 102 MMOL/L (ref 97–108)
CO2 SERPL-SCNC: 21 MMOL/L (ref 21–32)
COCAINE UR QL SCN: NEGATIVE
COLOR UR: ABNORMAL
CREAT SERPL-MCNC: 1.65 MG/DL (ref 0.55–1.02)
DIFFERENTIAL METHOD BLD: ABNORMAL
DRUG SCRN COMMENT,DRGCM: ABNORMAL
EOSINOPHIL # BLD: 0 K/UL (ref 0–0.4)
EOSINOPHIL NFR BLD: 1 % (ref 0–7)
EPITH CASTS URNS QL MICRO: ABNORMAL /LPF
ERYTHROCYTE [DISTWIDTH] IN BLOOD BY AUTOMATED COUNT: 15.6 % (ref 11.5–14.5)
GLOBULIN SER CALC-MCNC: 5.4 G/DL (ref 2–4)
GLUCOSE BLD STRIP.AUTO-MCNC: 135 MG/DL (ref 65–100)
GLUCOSE BLD STRIP.AUTO-MCNC: 138 MG/DL (ref 65–100)
GLUCOSE BLD STRIP.AUTO-MCNC: 148 MG/DL (ref 65–100)
GLUCOSE SERPL-MCNC: 161 MG/DL (ref 65–100)
GLUCOSE UR STRIP.AUTO-MCNC: NEGATIVE MG/DL
HCT VFR BLD AUTO: 24 % (ref 35–47)
HGB BLD-MCNC: 8.5 G/DL (ref 11.5–16)
HGB UR QL STRIP: NEGATIVE
HYALINE CASTS URNS QL MICRO: ABNORMAL /LPF (ref 0–5)
IMM GRANULOCYTES # BLD: 0 K/UL (ref 0–0.04)
IMM GRANULOCYTES NFR BLD AUTO: 0 % (ref 0–0.5)
KETONES UR QL STRIP.AUTO: 15 MG/DL
LACTATE SERPL-SCNC: 0.9 MMOL/L (ref 0.4–2)
LACTATE SERPL-SCNC: 4.2 MMOL/L (ref 0.4–2)
LEUKOCYTE ESTERASE UR QL STRIP.AUTO: ABNORMAL
LIPASE SERPL-CCNC: 160 U/L (ref 73–393)
LYMPHOCYTES # BLD: 1.6 K/UL (ref 0.8–3.5)
LYMPHOCYTES NFR BLD: 25 % (ref 12–49)
MCH RBC QN AUTO: 32.6 PG (ref 26–34)
MCHC RBC AUTO-ENTMCNC: 35.4 G/DL (ref 30–36.5)
MCV RBC AUTO: 92 FL (ref 80–99)
METHADONE UR QL: NEGATIVE
MONOCYTES # BLD: 0.7 K/UL (ref 0–1)
MONOCYTES NFR BLD: 11 % (ref 5–13)
NEUTS SEG # BLD: 4.2 K/UL (ref 1.8–8)
NEUTS SEG NFR BLD: 63 % (ref 32–75)
NITRITE UR QL STRIP.AUTO: NEGATIVE
NRBC # BLD: 0.04 K/UL (ref 0–0.01)
NRBC BLD-RTO: 0.6 PER 100 WBC
OPIATES UR QL: POSITIVE
PCP UR QL: NEGATIVE
PH UR STRIP: 8 [PH] (ref 5–8)
PLATELET # BLD AUTO: 212 K/UL (ref 150–400)
PMV BLD AUTO: 11.8 FL (ref 8.9–12.9)
POTASSIUM SERPL-SCNC: 2.7 MMOL/L (ref 3.5–5.1)
PROT SERPL-MCNC: 9.9 G/DL (ref 6.4–8.2)
PROT UR STRIP-MCNC: NEGATIVE MG/DL
RBC # BLD AUTO: 2.61 M/UL (ref 3.8–5.2)
RBC #/AREA URNS HPF: ABNORMAL /HPF (ref 0–5)
SERVICE CMNT-IMP: ABNORMAL
SODIUM SERPL-SCNC: 137 MMOL/L (ref 136–145)
SP GR UR REFRACTOMETRY: 1.01 (ref 1–1.03)
UA: UC IF INDICATED,UAUC: ABNORMAL
UROBILINOGEN UR QL STRIP.AUTO: 4 EU/DL (ref 0.2–1)
WBC # BLD AUTO: 6.6 K/UL (ref 3.6–11)
WBC URNS QL MICRO: ABNORMAL /HPF (ref 0–4)

## 2018-07-15 PROCEDURE — 74176 CT ABD & PELVIS W/O CONTRAST: CPT

## 2018-07-15 PROCEDURE — 96372 THER/PROPH/DIAG INJ SC/IM: CPT

## 2018-07-15 PROCEDURE — 80307 DRUG TEST PRSMV CHEM ANLYZR: CPT | Performed by: EMERGENCY MEDICINE

## 2018-07-15 PROCEDURE — 74011250637 HC RX REV CODE- 250/637: Performed by: EMERGENCY MEDICINE

## 2018-07-15 PROCEDURE — 85025 COMPLETE CBC W/AUTO DIFF WBC: CPT | Performed by: EMERGENCY MEDICINE

## 2018-07-15 PROCEDURE — 36415 COLL VENOUS BLD VENIPUNCTURE: CPT | Performed by: EMERGENCY MEDICINE

## 2018-07-15 PROCEDURE — 74011250636 HC RX REV CODE- 250/636: Performed by: HOSPITALIST

## 2018-07-15 PROCEDURE — 96375 TX/PRO/DX INJ NEW DRUG ADDON: CPT

## 2018-07-15 PROCEDURE — 81001 URINALYSIS AUTO W/SCOPE: CPT | Performed by: EMERGENCY MEDICINE

## 2018-07-15 PROCEDURE — 99218 HC RM OBSERVATION: CPT

## 2018-07-15 PROCEDURE — 80053 COMPREHEN METABOLIC PANEL: CPT | Performed by: EMERGENCY MEDICINE

## 2018-07-15 PROCEDURE — 96361 HYDRATE IV INFUSION ADD-ON: CPT

## 2018-07-15 PROCEDURE — 74011250636 HC RX REV CODE- 250/636: Performed by: EMERGENCY MEDICINE

## 2018-07-15 PROCEDURE — 83605 ASSAY OF LACTIC ACID: CPT | Performed by: EMERGENCY MEDICINE

## 2018-07-15 PROCEDURE — 99284 EMERGENCY DEPT VISIT MOD MDM: CPT

## 2018-07-15 PROCEDURE — 96374 THER/PROPH/DIAG INJ IV PUSH: CPT

## 2018-07-15 PROCEDURE — 82962 GLUCOSE BLOOD TEST: CPT

## 2018-07-15 PROCEDURE — 83690 ASSAY OF LIPASE: CPT | Performed by: EMERGENCY MEDICINE

## 2018-07-15 PROCEDURE — 74011250637 HC RX REV CODE- 250/637: Performed by: HOSPITALIST

## 2018-07-15 RX ORDER — CITALOPRAM 20 MG/1
10 TABLET, FILM COATED ORAL EVERY EVENING
Status: DISCONTINUED | OUTPATIENT
Start: 2018-07-15 | End: 2018-07-27 | Stop reason: HOSPADM

## 2018-07-15 RX ORDER — ONDANSETRON 2 MG/ML
4 INJECTION INTRAMUSCULAR; INTRAVENOUS
Status: COMPLETED | OUTPATIENT
Start: 2018-07-15 | End: 2018-07-15

## 2018-07-15 RX ORDER — POTASSIUM CHLORIDE 7.45 MG/ML
10 INJECTION INTRAVENOUS
Status: COMPLETED | OUTPATIENT
Start: 2018-07-15 | End: 2018-07-20

## 2018-07-15 RX ORDER — SODIUM CHLORIDE AND POTASSIUM CHLORIDE .9; .15 G/100ML; G/100ML
SOLUTION INTRAVENOUS CONTINUOUS
Status: DISCONTINUED | OUTPATIENT
Start: 2018-07-15 | End: 2018-07-16

## 2018-07-15 RX ORDER — HYDROCODONE BITARTRATE AND ACETAMINOPHEN 10; 325 MG/1; MG/1
1 TABLET ORAL
Status: DISCONTINUED | OUTPATIENT
Start: 2018-07-15 | End: 2018-07-27 | Stop reason: HOSPADM

## 2018-07-15 RX ORDER — DIPHENHYDRAMINE HYDROCHLORIDE 50 MG/ML
25 INJECTION, SOLUTION INTRAMUSCULAR; INTRAVENOUS
Status: DISCONTINUED | OUTPATIENT
Start: 2018-07-15 | End: 2018-07-15

## 2018-07-15 RX ORDER — HYDROCODONE BITARTRATE AND ACETAMINOPHEN 5; 325 MG/1; MG/1
1 TABLET ORAL
Status: DISCONTINUED | OUTPATIENT
Start: 2018-07-15 | End: 2018-07-15

## 2018-07-15 RX ORDER — ACETAMINOPHEN 325 MG/1
650 TABLET ORAL
Status: DISCONTINUED | OUTPATIENT
Start: 2018-07-15 | End: 2018-07-15

## 2018-07-15 RX ORDER — HYDROMORPHONE HYDROCHLORIDE 2 MG/ML
1 INJECTION, SOLUTION INTRAMUSCULAR; INTRAVENOUS; SUBCUTANEOUS
Status: DISCONTINUED | OUTPATIENT
Start: 2018-07-15 | End: 2018-07-19

## 2018-07-15 RX ORDER — ONDANSETRON 4 MG/1
4 TABLET, ORALLY DISINTEGRATING ORAL
Qty: 10 TAB | Refills: 0 | Status: SHIPPED | OUTPATIENT
Start: 2018-07-15 | End: 2019-04-14

## 2018-07-15 RX ORDER — HYDROMORPHONE HYDROCHLORIDE 2 MG/ML
2 INJECTION, SOLUTION INTRAMUSCULAR; INTRAVENOUS; SUBCUTANEOUS
Status: DISCONTINUED | OUTPATIENT
Start: 2018-07-15 | End: 2018-07-15

## 2018-07-15 RX ORDER — SODIUM CHLORIDE 0.9 % (FLUSH) 0.9 %
5-10 SYRINGE (ML) INJECTION EVERY 8 HOURS
Status: DISCONTINUED | OUTPATIENT
Start: 2018-07-15 | End: 2018-07-27 | Stop reason: HOSPADM

## 2018-07-15 RX ORDER — DIPHENHYDRAMINE HYDROCHLORIDE 50 MG/ML
25 INJECTION, SOLUTION INTRAMUSCULAR; INTRAVENOUS
Status: COMPLETED | OUTPATIENT
Start: 2018-07-15 | End: 2018-07-15

## 2018-07-15 RX ORDER — SODIUM CHLORIDE 9 MG/ML
125 INJECTION, SOLUTION INTRAVENOUS CONTINUOUS
Status: DISCONTINUED | OUTPATIENT
Start: 2018-07-15 | End: 2018-07-15

## 2018-07-15 RX ORDER — LORAZEPAM 2 MG/ML
1 INJECTION INTRAMUSCULAR
Status: COMPLETED | OUTPATIENT
Start: 2018-07-15 | End: 2018-07-15

## 2018-07-15 RX ORDER — POTASSIUM CHLORIDE 750 MG/1
40 TABLET, FILM COATED, EXTENDED RELEASE ORAL
Status: DISCONTINUED | OUTPATIENT
Start: 2018-07-15 | End: 2018-07-15 | Stop reason: ALTCHOICE

## 2018-07-15 RX ORDER — LORAZEPAM 2 MG/ML
2 INJECTION INTRAMUSCULAR
Status: COMPLETED | OUTPATIENT
Start: 2018-07-15 | End: 2018-07-15

## 2018-07-15 RX ORDER — ONDANSETRON 4 MG/1
4 TABLET, ORALLY DISINTEGRATING ORAL
Status: COMPLETED | OUTPATIENT
Start: 2018-07-15 | End: 2018-07-15

## 2018-07-15 RX ORDER — DIPHENHYDRAMINE HCL 12.5MG/5ML
25 ELIXIR ORAL
Status: DISCONTINUED | OUTPATIENT
Start: 2018-07-15 | End: 2018-07-20

## 2018-07-15 RX ORDER — PROMETHAZINE HYDROCHLORIDE 25 MG/1
25 SUPPOSITORY RECTAL
Qty: 12 SUPPOSITORY | Refills: 0 | Status: ON HOLD | OUTPATIENT
Start: 2018-07-15 | End: 2019-01-24

## 2018-07-15 RX ORDER — LORAZEPAM 2 MG/ML
1 INJECTION INTRAMUSCULAR
Status: DISCONTINUED | OUTPATIENT
Start: 2018-07-15 | End: 2018-07-15

## 2018-07-15 RX ORDER — FOLIC ACID 1 MG/1
1 TABLET ORAL DAILY
Status: DISCONTINUED | OUTPATIENT
Start: 2018-07-16 | End: 2018-07-27 | Stop reason: HOSPADM

## 2018-07-15 RX ORDER — HALOPERIDOL 5 MG/ML
5 INJECTION INTRAMUSCULAR
Status: COMPLETED | OUTPATIENT
Start: 2018-07-15 | End: 2018-07-15

## 2018-07-15 RX ORDER — SODIUM CHLORIDE 9 MG/ML
1000 INJECTION, SOLUTION INTRAVENOUS ONCE
Status: COMPLETED | OUTPATIENT
Start: 2018-07-15 | End: 2018-07-15

## 2018-07-15 RX ORDER — HALOPERIDOL 5 MG/ML
10 INJECTION INTRAMUSCULAR
Status: DISCONTINUED | OUTPATIENT
Start: 2018-07-15 | End: 2018-07-15

## 2018-07-15 RX ORDER — HALOPERIDOL 5 MG/ML
5 INJECTION INTRAMUSCULAR
Status: DISCONTINUED | OUTPATIENT
Start: 2018-07-15 | End: 2018-07-15

## 2018-07-15 RX ORDER — HEPARIN SODIUM 5000 [USP'U]/ML
5000 INJECTION, SOLUTION INTRAVENOUS; SUBCUTANEOUS EVERY 8 HOURS
Status: DISCONTINUED | OUTPATIENT
Start: 2018-07-15 | End: 2018-07-24

## 2018-07-15 RX ORDER — SODIUM CHLORIDE 0.9 % (FLUSH) 0.9 %
5-10 SYRINGE (ML) INJECTION AS NEEDED
Status: DISCONTINUED | OUTPATIENT
Start: 2018-07-15 | End: 2018-07-23

## 2018-07-15 RX ORDER — METOCLOPRAMIDE HYDROCHLORIDE 5 MG/ML
10 INJECTION INTRAMUSCULAR; INTRAVENOUS
Status: DISCONTINUED | OUTPATIENT
Start: 2018-07-15 | End: 2018-07-15

## 2018-07-15 RX ORDER — PROMETHAZINE HYDROCHLORIDE 25 MG/1
25 SUPPOSITORY RECTAL
Status: COMPLETED | OUTPATIENT
Start: 2018-07-15 | End: 2018-07-15

## 2018-07-15 RX ORDER — ONDANSETRON 2 MG/ML
4 INJECTION INTRAMUSCULAR; INTRAVENOUS
Status: DISCONTINUED | OUTPATIENT
Start: 2018-07-15 | End: 2018-07-27 | Stop reason: HOSPADM

## 2018-07-15 RX ORDER — DIPHENHYDRAMINE HCL 50 MG
50 CAPSULE ORAL
Status: COMPLETED | OUTPATIENT
Start: 2018-07-15 | End: 2018-07-15

## 2018-07-15 RX ORDER — PROCHLORPERAZINE EDISYLATE 5 MG/ML
10 INJECTION INTRAMUSCULAR; INTRAVENOUS
Status: COMPLETED | OUTPATIENT
Start: 2018-07-15 | End: 2018-07-15

## 2018-07-15 RX ADMIN — DIPHENHYDRAMINE HYDROCHLORIDE 25 MG: 25 SOLUTION ORAL at 12:22

## 2018-07-15 RX ADMIN — Medication 10 ML: at 15:37

## 2018-07-15 RX ADMIN — HYDROMORPHONE HYDROCHLORIDE 1 MG: 2 INJECTION, SOLUTION INTRAMUSCULAR; INTRAVENOUS; SUBCUTANEOUS at 17:59

## 2018-07-15 RX ADMIN — LORAZEPAM 2 MG: 2 INJECTION INTRAMUSCULAR; INTRAVENOUS at 07:31

## 2018-07-15 RX ADMIN — ONDANSETRON 4 MG: 4 TABLET, ORALLY DISINTEGRATING ORAL at 01:55

## 2018-07-15 RX ADMIN — SODIUM CHLORIDE AND POTASSIUM CHLORIDE: 9; 1.49 INJECTION, SOLUTION INTRAVENOUS at 14:36

## 2018-07-15 RX ADMIN — SODIUM CHLORIDE 1000 ML: 900 INJECTION, SOLUTION INTRAVENOUS at 07:40

## 2018-07-15 RX ADMIN — DIPHENHYDRAMINE HYDROCHLORIDE 25 MG: 50 INJECTION, SOLUTION INTRAMUSCULAR; INTRAVENOUS at 04:27

## 2018-07-15 RX ADMIN — POTASSIUM CHLORIDE 10 MEQ: 10 INJECTION, SOLUTION INTRAVENOUS at 11:25

## 2018-07-15 RX ADMIN — PROMETHAZINE HYDROCHLORIDE 25 MG: 25 SUPPOSITORY RECTAL at 02:27

## 2018-07-15 RX ADMIN — PROCHLORPERAZINE EDISYLATE 10 MG: 5 INJECTION INTRAMUSCULAR; INTRAVENOUS at 07:47

## 2018-07-15 RX ADMIN — CITALOPRAM HYDROBROMIDE 10 MG: 20 TABLET ORAL at 17:58

## 2018-07-15 RX ADMIN — HYDROCODONE BITARTRATE AND ACETAMINOPHEN 1 TABLET: 5; 325 TABLET ORAL at 12:22

## 2018-07-15 RX ADMIN — POTASSIUM CHLORIDE 10 MEQ: 10 INJECTION, SOLUTION INTRAVENOUS at 10:19

## 2018-07-15 RX ADMIN — Medication 10 ML: at 21:38

## 2018-07-15 RX ADMIN — HEPARIN SODIUM 5000 UNITS: 5000 INJECTION INTRAVENOUS; SUBCUTANEOUS at 15:36

## 2018-07-15 RX ADMIN — SODIUM CHLORIDE AND POTASSIUM CHLORIDE: 9; 1.49 INJECTION, SOLUTION INTRAVENOUS at 22:40

## 2018-07-15 RX ADMIN — POTASSIUM CHLORIDE 10 MEQ: 10 INJECTION, SOLUTION INTRAVENOUS at 17:59

## 2018-07-15 RX ADMIN — HEPARIN SODIUM 5000 UNITS: 5000 INJECTION INTRAVENOUS; SUBCUTANEOUS at 21:38

## 2018-07-15 RX ADMIN — ONDANSETRON 4 MG: 2 INJECTION INTRAMUSCULAR; INTRAVENOUS at 12:12

## 2018-07-15 RX ADMIN — HALOPERIDOL LACTATE 5 MG: 5 INJECTION, SOLUTION INTRAMUSCULAR at 04:27

## 2018-07-15 RX ADMIN — ONDANSETRON 4 MG: 2 INJECTION, SOLUTION INTRAMUSCULAR; INTRAVENOUS at 00:49

## 2018-07-15 RX ADMIN — DIPHENHYDRAMINE HYDROCHLORIDE 50 MG: 50 CAPSULE ORAL at 05:16

## 2018-07-15 RX ADMIN — HYDROMORPHONE HYDROCHLORIDE 1 MG: 2 INJECTION, SOLUTION INTRAMUSCULAR; INTRAVENOUS; SUBCUTANEOUS at 14:32

## 2018-07-15 RX ADMIN — HYDROCODONE BITARTRATE AND ACETAMINOPHEN 1 TABLET: 10; 325 TABLET ORAL at 20:14

## 2018-07-15 RX ADMIN — POTASSIUM CHLORIDE 10 MEQ: 10 INJECTION, SOLUTION INTRAVENOUS at 15:35

## 2018-07-15 RX ADMIN — LORAZEPAM 1 MG: 2 INJECTION INTRAMUSCULAR; INTRAVENOUS at 07:46

## 2018-07-15 RX ADMIN — POTASSIUM CHLORIDE 10 MEQ: 10 INJECTION, SOLUTION INTRAVENOUS at 20:11

## 2018-07-15 NOTE — PROGRESS NOTES
Pt rocking back and forth in bed, complaining of severe back pain, Norco 5/325 mg PO given @ [phone], pt states \"I have sickle cell disease, and being sick like this makes my sickle cell flare up, I need more pain medicine than this, I need Dilaudid IV, you can see from my records\"; paged admitting hospitalist, and informed him of above, Dr. Bela Parra is placing orders.

## 2018-07-15 NOTE — H&P
[address] [facility] Shanti Bautista  MR#: [id number]  : 1960  ACCOUNT #: [de-identified]   ADMIT DATE: 07/15/2018    CHIEF COMPLAINT:  Nausea and vomiting. HISTORY OF PRESENT ILLNESS:  The patient is a 62-year-old [place] American female with past medical history of sickle cell anemia. She was last admitted here at [facility] in 2018 for sickle cell crisis. She is coming in again to the ER complaining of nausea and vomiting. She was otherwise in her normal state of health until yesterday evening after she ate hamburger from [employer]. She later developed some nausea and vomiting. She said she had numerous episodes of nausea and vomiting all through the night containing greenish fluid, but nonbloody. No hematemesis. No associated abdominal pain or diarrhea. No constipation. She said her friend who ate the hamburger with her is fine. She denies any fever, chills or rigors. No chest pain or shortness of breath. Current complaint at this time is nausea, vomiting and back pain which she describes as 8/10 in severity. Here in the emergency department, she has received numerous intravenous fluid. She was bolused with 1 liter of normal saline intravenous fluid and potassium was low, which was repleted. Due to intractable nature of the vomiting, we were consulted for hospital admission and for evaluation. Recent hospitalization was in January as mentioned above for sickle cell crisis. PAST MEDICAL HISTORY:  1. Sickle cell anemia. PAST SURGICAL HISTORY:  She is status post cholecystectomy, status post orthopedic surgery. HOME MEDICATIONS:  Includes Flexeril 1 tablet 3 times daily as needed for muscle spasms, losartan/hydrochlorothiazide 0.5 mg tablet by mouth daily, omeprazole 20 mg p.o. daily. ALLERGIES:  DILAUDID AND PERCOCET. SHE SAYS IT ITCHES BUT SHE TOLERATES IT WITH BENADRYL.     FAMILY HISTORY:  She has no pertinent family history to report. SOCIAL HISTORY:  She tells me her daughter lives with her. She is on disability. She does not smoke cigarettes. No alcohol use. Her daughter, Miss Allie James is surrogate decision maker. REVIEW OF SYSTEMS:  A 12-point review of system was done and this was found to be negative apart from what was stated in the body of the history. PHYSICAL EXAMINATION:  VITAL SIGNS:  Temperature of 97.7, pulse 87 beats per minute, blood pressure 134/71 mmHg, respirations 16, oxygen saturation is 100% on room air. GENERAL APPEARANCE:  She is lying on the bed in mild to moderate painful distress, but she looks her stated age. HEENT:  Atraumatic, normocephalic. Pupils are equal, round, reactive to light and accommodation. Extraocular muscles are intact. Oral mucosa is moist.  She has positive conjunctival pallor. NECK:  Supple, no jugular venous distention. RESPIRATIONS:  Clear to auscultation bilaterally. No wheeze or rales heard. CARDIOVASCULAR:  S1 and S2 heard. Regular rate and rhythm. No gallops or murmurs. ABDOMEN:  Soft, nontender, nondistended. EXTREMITIES:  No pitting pedal edema, +2 dorsalis pedis pulses. NEUROLOGIC:  She is alert, awake and oriented x3. No focal neurologic deficit. LABORATORY DATA:  WBC 6.6, hemoglobin 8.5, platelets 536, neutrophils 63. Urinalysis unremarkable. Chemistry:  Sodium of 137, potassium 2.7, chloride 102, bicarbonate 21, glucose 161, BUN 14, creatinine 1.65. ALT 17, AST 34, lactic acid 4.2. CT scan of the abdomen and pelvis without contrast done shows no acute disease. ASSESSMENT AND PLAN:  The patient is a 53-year-old sickle cell anemic patient who presents to the emergency department on account of nausea and vomiting. 1.  Intractable nausea and vomiting, possibly due to viral gastroenteritis versus other. We will manage her conservatively with as needed Zofran and promethazine and intravenous fluid hydration.   We will continue supportive care. 2.  Low back pain, possibly related to sickle cell disease. We will aggressively hydrate with isotonic fluid. anlgesia for pain control  3. Anemia due to sickle cell anemia, 8.5. We will monitor H and H closely. No evidence of hemolysis. 4.  Hypokalemia requiring KCl supplementation. We will continue to monitor and replete as needed. 5.  Acute kidney injury with creatinine of 1.65, baseline around 0.80. We will hydrate with normal saline intravenously. Repeat basic metabolic panel in a.m.  6.  Elevated lactic acid due to hypoperfusion. Continue IV fluids. Repeat lactic acid in 6 hours. DISPOSITION:  The patient will be admitted to the medical floor on observation.       MD FRANNY Albrecht/NAHUM  D: 07/15/2018 13:25     T: 07/15/2018 15:30  JOB #: [id number]

## 2018-07-15 NOTE — ED NOTES
Patient currently is still receiving the second bolus of fluids ordered earlier from Dr. Laguerre Dus

## 2018-07-15 NOTE — ED NOTES
Bedside and Verbal shift change report given to Chata RN (oncoming nurse) by Edgard Balderrama RN (offgoing nurse). Report included the following information SBAR, Kardex, ED Summary, Intake/Output, MAR, Recent Results and Med Rec Status.

## 2018-07-15 NOTE — ED NOTES
Pt is difficult stick, stating that when she comes for sickle cell crisis that IVs are inserted via ultrasound guided

## 2018-07-15 NOTE — ED NOTES
Patient has vomited again, approx 300cc of brown gastric contents, no distinctive odor. MD informed.

## 2018-07-15 NOTE — PROGRESS NOTES
Pt arrived from ER with 20 gauge IV in right thumb; IV is infiltrated; attempted to restart IV and unsuccessful, will seek further resources; pt states that ultrasound was used in ER and that she usually has PICC lines.

## 2018-07-15 NOTE — PROGRESS NOTES
TRANSFER - IN REPORT:    Verbal report received from Charley RN(name) on Bharat Hernández  being received from ER(unit) for routine progression of care      Report consisted of patients Situation, Background, Assessment and   Recommendations(SBAR). Information from the following report(s) SBAR, Kardex, Intake/Output, MAR and Recent Results was reviewed with the receiving nurse. Opportunity for questions and clarification was provided. Assessment completed upon patients arrival to unit and care assumed.

## 2018-07-15 NOTE — ED NOTES
Lactic acid will not be re-drawn until after the patient completes the first liter of fluids per Dr. Mart Reed. Patients lactic acid was not initially completed after the first four hours due to unsuccessful IV attempts and patient not receiving any fluids.

## 2018-07-15 NOTE — ED PROVIDER NOTES
EMERGENCY DEPARTMENT HISTORY AND PHYSICAL EXAM  
     
 
Date: 7/15/2018 Patient Name: Scot Dang History of Presenting Illness Chief Complaint Patient presents with  Vomiting N/V since eating at [facility] x2 hours ago History Provided By: Patient HPI: Scot Dang is a 56 y.o. female, pmhx depression, HTN, anemia, hepatitis C and sickle cell, who presents ambulatory to the ED c/o intermittent episodes of NV with associated ABD pain since 2100. She denies any alleviating or exacerbating factors. She denies tobacco, alcohol and drug use. Otherwise without complaints. She specifically denies any recent fevers, chills, diarrhea, CP, SOB, urinary sxs, changes in BM, or headache. PCP: Teja Becerra MD 
 
Allergies: dilaudid, percocet PMHx: Significant for HTN, anemia, hepatitis C, sickle cell, depression PSHx: Significant for cholecystectomy Social Hx: tobacco (-), EtOH (-), Illicit Drugs (-) There are no other complaints, changes, or physical findings at this time. Current Facility-Administered Medications Medication Dose Route Frequency Provider Last Rate Last Dose  sodium chloride 0.9 % bolus infusion 1,000 mL  1,000 mL IntraVENous NOW Darling Walker MD   Stopped at 07/15/18 0051  
 0.9% sodium chloride infusion 1,000 mL  1,000 mL IntraVENous ONCE Darling Walker MD   Stopped at 07/15/18 9261  prochlorperazine (COMPAZINE) injection 10 mg  10 mg IntraVENous NOW Darling Walker MD      
 
Current Outpatient Prescriptions Medication Sig Dispense Refill  fentaNYL (DURAGESIC) 75 mcg/hr 1 Patch by TransDERmal route every seventy-two (72) hours. Max Daily Amount: 1 Patch. 10 Patch 0  
 HYDROcodone-acetaminophen (NORCO)  mg tablet Take 1 Tab by mouth every six (6) hours as needed for Pain. Max Daily Amount: 4 Tabs. Indications: sickl;e cell crisis 918 Tab 0  
 folic acid (FOLVITE) 1 mg tablet Take 1 Tab by mouth daily.  30 Tab 11  
 OMEPRAZOLE PO Take 20 mg by mouth.  cyclobenzaprine (FLEXERIL) 10 mg tablet Take 1 Tab by mouth three (3) times daily as needed for Muscle Spasm(s). 50 Tab 1  citalopram (CELEXA) 10 mg tablet TAKE 1 TABLET BY MOUTH EVERY NIGHT AT BEDTIME 90 Tab 3  
 losartan-hydroCHLOROthiazide (HYZAAR) 50-12.5 mg per tablet TAKE 1 TABLET BY MOUTH DAILY 90 Tab 3  
 fluocinoNIDE (LIDEX) 0.05 % topical cream two (2) times daily as needed. Past History Past Medical History: 
Past Medical History:  
Diagnosis Date  Anemia SC hemoglobinopathy  Chronic pain  Depression  Hypertension  Liver disease   
 hepatitis C; pt reports \"cured\"  Sickle cell disease ([facility] [address] Past Surgical History: 
Past Surgical History:  
Procedure Laterality Date  BREAST SURGERY PROCEDURE UNLISTED 2 cysts removed from R breast  
 CHEST SURGERY PROCEDURE UNLISTED    
 status post thor for removal of a benign  HX BREAST BIOPSY Right 05/2007  
 negative  HX CHOLECYSTECTOMY  HX ORTHOPAEDIC    
 bony curettage of an ostial myelitis focus Family History: 
Family History Problem Relation Age of Onset  Hypertension Mother  Hypertension Father Social History: 
Social History Substance Use Topics  Smoking status: Never Smoker  Smokeless tobacco: Never Used  Alcohol use No  
 
 
Allergies: Allergies Allergen Reactions  Dilaudid [Hydromorphone (Bulk)] Itching Can tolerate with benadryl and ondansetron  Percocet [Oxycodone-Acetaminophen] Itching Review of Systems Review of Systems Constitutional: Negative. Negative for fever. Eyes: Negative. Respiratory: Negative. Negative for shortness of breath. Cardiovascular: Negative for chest pain. Gastrointestinal: Positive for abdominal pain, nausea and vomiting. Endocrine: Negative. Genitourinary: Negative. Negative for difficulty urinating, dysuria and hematuria. Musculoskeletal: Negative.    
Skin: Negative. Allergic/Immunologic: Negative. Neurological: Negative. Psychiatric/Behavioral: Negative for suicidal ideas. All other systems reviewed and are negative. Physical Exam  
Physical Exam  
Constitutional: She is oriented to person, place, and time. She appears well-developed and well-nourished. She appears distressed. HENT:  
Head: Normocephalic and atraumatic. Nose: Nose normal.  
Eyes: Conjunctivae and EOM are normal. No scleral icterus. Neck: Normal range of motion. No tracheal deviation present. Cardiovascular: Normal rate, regular rhythm, normal heart sounds and intact distal pulses. Exam reveals no friction rub. No murmur heard. Pulmonary/Chest: Effort normal and breath sounds normal. No stridor. No respiratory distress. She has no wheezes. She has no rales. Abdominal: Soft. Normal appearance and bowel sounds are normal. She exhibits no distension. There is tenderness in the epigastric area. There is no rebound. Musculoskeletal: Normal range of motion. She exhibits no tenderness. Neurological: She is alert and oriented to person, place, and time. No cranial nerve deficit. Skin: Skin is warm. No rash noted. She is diaphoretic. Psychiatric: She has a normal mood and affect. Her speech is normal and behavior is normal. Judgment and thought content normal. Cognition and memory are normal.  
Nursing note and vitals reviewed. Diagnostic Study Results Labs - Recent Results (from the past 12 hour(s)) CBC WITH AUTOMATED DIFF Collection Time: 07/15/18 12:39 AM  
Result Value Ref Range WBC 6.6 3.6 - 11.0 K/uL  
 RBC 2.61 (L) 3.80 - 5.20 M/uL HGB 8.5 (L) 11.5 - 16.0 g/dL HCT 24.0 (L) 35.0 - 47.0 % MCV 92.0 80.0 - 99.0 FL  
 MCH 32.6 26.0 - 34.0 PG  
 MCHC 35.4 30.0 - 36.5 g/dL  
 RDW 15.6 (H) 11.5 - 14.5 % PLATELET 255 552 - 153 K/uL MPV 11.8 8.9 - 12.9 FL  
 NRBC 0.6 (H) 0  WBC ABSOLUTE NRBC 0.04 (H) 0.00 - 0.01 K/uL NEUTROPHILS 63 32 - 75 % LYMPHOCYTES 25 12 - 49 % MONOCYTES 11 5 - 13 % EOSINOPHILS 1 0 - 7 % BASOPHILS 1 0 - 1 % IMMATURE GRANULOCYTES 0 0.0 - 0.5 % ABS. NEUTROPHILS 4.2 1.8 - 8.0 K/UL  
 ABS. LYMPHOCYTES 1.6 0.8 - 3.5 K/UL  
 ABS. MONOCYTES 0.7 0.0 - 1.0 K/UL  
 ABS. EOSINOPHILS 0.0 0.0 - 0.4 K/UL  
 ABS. BASOPHILS 0.0 0.0 - 0.1 K/UL  
 ABS. IMM. GRANS. 0.0 0.00 - 0.04 K/UL  
 DF AUTOMATED METABOLIC PANEL, COMPREHENSIVE Collection Time: 07/15/18 12:39 AM  
Result Value Ref Range Sodium 137 136 - 145 mmol/L Potassium 2.7 (LL) 3.5 - 5.1 mmol/L Chloride 102 97 - 108 mmol/L  
 CO2 21 21 - 32 mmol/L Anion gap 14 5 - 15 mmol/L Glucose 161 (H) 65 - 100 mg/dL BUN 14 6 - 20 MG/DL Creatinine 1.65 (H) 0.55 - 1.02 MG/DL  
 BUN/Creatinine ratio 8 (L) 12 - 20 GFR est AA 39 (L) >60 ml/min/1.73m2 GFR est non-AA 32 (L) >60 ml/min/1.73m2 Calcium 9.6 8.5 - 10.1 MG/DL Bilirubin, total 2.8 (H) 0.2 - 1.0 MG/DL  
 ALT (SGPT) 17 12 - 78 U/L  
 AST (SGOT) 34 15 - 37 U/L Alk. phosphatase 91 45 - 117 U/L Protein, total 9.9 (H) 6.4 - 8.2 g/dL Albumin 4.5 3.5 - 5.0 g/dL Globulin 5.4 (H) 2.0 - 4.0 g/dL A-G Ratio 0.8 (L) 1.1 - 2.2 LACTIC ACID Collection Time: 07/15/18 12:39 AM  
Result Value Ref Range Lactic acid 4.2 (HH) 0.4 - 2.0 MMOL/L  
LIPASE Collection Time: 07/15/18 12:39 AM  
Result Value Ref Range Lipase 160 73 - 393 U/L  
URINALYSIS W/ REFLEX CULTURE Collection Time: 07/15/18  3:32 AM  
Result Value Ref Range Color DARK YELLOW Appearance CLEAR CLEAR Specific gravity 1.013 1.003 - 1.030    
 pH (UA) 8.0 5.0 - 8.0 Protein NEGATIVE  NEG mg/dL Glucose NEGATIVE  NEG mg/dL Ketone 15 (A) NEG mg/dL Bilirubin NEGATIVE  NEG Blood NEGATIVE  NEG Urobilinogen 4.0 (H) 0.2 - 1.0 EU/dL Nitrites NEGATIVE  NEG Leukocyte Esterase SMALL (A) NEG    
 WBC 0-4 0 - 4 /hpf  
 RBC 0-5 0 - 5 /hpf  Epithelial cells FEW FEW /lpf  
 Bacteria NEGATIVE  NEG /hpf  
 UA:UC IF INDICATED CULTURE NOT INDICATED BY UA RESULT CNI Hyaline cast 2-5 0 - 5 /lpf DRUG SCREEN, URINE Collection Time: 07/15/18  3:35 AM  
Result Value Ref Range AMPHETAMINES NEGATIVE  NEG    
 BARBITURATES NEGATIVE  NEG BENZODIAZEPINES NEGATIVE  NEG    
 COCAINE NEGATIVE  NEG METHADONE NEGATIVE  NEG    
 OPIATES POSITIVE (A) NEG    
 PCP(PHENCYCLIDINE) NEGATIVE  NEG    
 THC (TH-CANNABINOL) NEGATIVE  NEG Drug screen comment (NOTE) Radiologic Studies -  
CT ABD PELV WO CONT Final Result CT Results  (Last 48 hours) 07/15/18 0652  CT ABD PELV WO CONT Final result Impression:  IMPRESSION: No Acute Disease. Narrative:  INDICATION: Nausea, vomiting EXAM: CT Abdomen and CT Pelvis without contrast.  
CT dose reduction was achieved through use of a standardized protocol tailored  
for this examination and automatic exposure control for dose modulation. FINDINGS:   
No urinary tract stones are seen. There is no hydroureteronephrosis. The kidneys  
are normal in size. There is no perirenal fluid or ascites. Liver shows no apparent significant finding without contrast. Pancreas, adrenal  
glands and aorta show no significant enlargement. Gallbladder is absent. Spleen is atrophic and calcified compatible with sickle cell disease. Osseous  
sclerosis is compatible with sickle cell disease. No inflammation is seen. There is no pneumoperitoneum or significant adenopathy. The bladder is not distended. The distal ureters are not dilated. There is no  
apparent pelvic mass. The appendix is normal. Bowels are not dilated. CXR Results  (Last 48 hours) None Medical Decision Making I am the first provider for this patient.  
 
I reviewed the vital signs, available nursing notes, past medical history, past surgical history, family history and social history. Vital Signs-Reviewed the patient's vital signs. Patient Vitals for the past 12 hrs: 
 Temp Pulse Resp BP SpO2  
07/15/18 0430 - - - 130/72 -  
07/15/18 0415 - - - 100/81 -  
07/15/18 0400 - - - 145/90 -  
07/15/18 0100 - - - 126/53 -  
07/15/18 0045 - - - 130/72 -  
07/15/18 0008 97.7 °F (36.5 °C) 87 16 134/71 100 % Pulse Oximetry Analysis - 100% on RA Cardiac Monitor:  
Rate: 87 bpm 
 
Records Reviewed: Nursing Notes, Old Medical Records and Previous Laboratory Studies Provider Notes (Medical Decision Making): DDX: 
Gastroenteritis, uti, dehydration, electrolyte abnormality, cyclic vomiting Plan: 
Labs, ua, ivf, antiemetic Impression: 
Intractable vomiting ED Course:  
Initial assessment performed. The patients presenting problems have been discussed, and they are in agreement with the care plan formulated and outlined with them. I have encouraged them to ask questions as they arise throughout their visit. I reviewed our electronic medical record system for any past medical records that were available that may contribute to the patients current condition, the nursing notes and and vital signs from today's visit Nursing notes will be reviewed as they become available in realtime while the pt has been in the ED. Pearl Blevins MD 
  
I personally reviewed pt's imaging. Official read by radiology noted above. Pearl Blevins MD 
 
PROGRESS NOTE 
02:30 AM 
 Informed that pt's IV infiltrated yet again despite several PIV attempts by techs/nurses with and w/o US. Labs sent, noted dehydration. Will continue with sx management via po/odt/im injections, push for PO hydration. Pearl Blevins MD 
 
PROGRESS NOTE: 
3:10 AM 
Pt reevaluated. Pt has currently stopped actively vomiting but is still \"dry heaving. \" Will continue to work on [employer] control with  
Written by Letty Mac, as dictated by Pearl Blevins MD. 
 
PROGRESS NOTE 
4:30 AM  
Pt with continued sx, will try IM haldol and benadryl, UDS sent for possible cyclic vomiting. Pt unable to tolerate any PO liquids for oral rehydration. Will re-attempt line placement. William Davidson MD 
 
 
Procedure Note- Peripheral IV Access 5:12 AM 
Performed by: MD William Campbell MD gained IV access using  20 gauge needle because the patient had no vascular access. After cleaning the site with alcohol prep, the Left basilic vein was localized with ultrasound guidance in an anterior approach. Line confirmation was obtained by direct visualization and good blood return. No anaesthetic was used. The line was successfully flushed with normal saline and was secured with transparent tape. Estimated blood loss: 0 The procedure took 1-15 minutes, and pt tolerated well. Procedure Note- Peripheral IV Access 6:19 AM 
Performed by: MD William Campbell MD gained IV access using  22 gauge needle because the patient had no vascular access. After cleaning the site with alcohol prep, the Left UE was localized with ultrasound guidance in an anterior approach. Line confirmation was obtained by direct visualization and good blood return. No anaesthetic was used. The line was successfully flushed with normal saline and was secured with transparent tape. Estimated blood loss: 0 The procedure took 1-15 minutes, and pt tolerated well. 6:43 AM 
Notified that pt IV has infiltrated again. Nursing to attempt finger/hand IV and 4th line antiemetic (compazine). Pt also requests medication for \"nerves\" noting that the benadryl did not help. Will provide IM ativan while awaiting iv placement and compazine administration. If pt's sx not improved with this medication, will plan for admission for intractable vomiting. Pt and family made aware of plan. Written by OZZIE Ordaz, as dictated by William Davidson MD. 
 
9:16 AM 
Pt is N/V and did not tolerate oral potassium.   
 
CONSULT NOTE:  
10 06 AM 
Rosanna CONTRERAS Tia Tan MD spoke with Wes Loera MD, Specialty: Hospitalist 
Discussed pt's hx, disposition, and available diagnostic and imaging results. Reviewed care plans. Consultant will evaluate pt for admission. Written by Connie Campos. Renan Barajas, OZZIE Scribe, as dictated by Shanon Adam MD 
 
 
 
Critical Care Time:  
 
none Diagnosis Clinical Impression:  
1. Intractable vomiting with nausea, unspecified vomiting type 2. Dehydration 3. Acute renal insufficiency PLAN: 
1. Admit to Hospitalist 
 
Disposition: 
Admit Note: 
10:06 AM 
Pt is being admitted by Wes Loera MD. The results of their tests and reason(s) for their admission have been discussed with pt and/or available family. They convey agreement and understanding for the need to be admitted and for admission diagnosis. Attestations: This note is prepared by Dami Linda, acting as Scribe for MD Mary Anne Fang MD : The scribe's documentation has been prepared under my direction and personally reviewed by me in its entirety. I confirm that the note above accurately reflects all work, treatment, procedures, and medical decision making performed by me. This note will not be viewable in [address]

## 2018-07-15 NOTE — ED NOTES
Unable to obtain IV even after ultrasound attempt, MD aware. Pt continues to experience nausea and gagging.  Pt medicated with ODT zofran, daughter requesting pt receive IM medication at this time  Pt provided with large mug of ice water per Dr. Marquis Velasco (pt refusing to drink)

## 2018-07-15 NOTE — ED NOTES
Patient states that she vomited while in bathroom giving a urine specimen. Unable to visualize gastric contents.

## 2018-07-15 NOTE — ED TRIAGE NOTES
Pt arrives to ED with complaints of n/v x2 hours since eating a Pri[employer] at [employer]  Pt vomited x2 en route, placed in ED bed with emesis bag in hand  Call bell in reach

## 2018-07-15 NOTE — ED NOTES
Patient has been resting with eyes closed, no signs of pain or distress.   Daughter remains at bedside

## 2018-07-15 NOTE — PROGRESS NOTES
Primary Nurse Valentino Silva RN and Clementine Helton RN performed a dual skin assessment on this patient Impairment noted- Scaring b/l arms and under right breast   Nuno score is 23

## 2018-07-15 NOTE — IP AVS SNAPSHOT
[address] [facility] 83. 
[phone] Patient: Lyndsey Ch MRN: [id number] :[id number] About your hospitalization You were admitted on:  July 15, 2018 You last received care in the:  [facility] 3 [facility] TELE You were discharged on:  2018 Why you were hospitalized Your primary diagnosis was: Intractable Nausea And Vomiting Your diagnoses also included:  Nausea And Vomiting, Vomiting Follow-up Information Follow up With Details Comments Contact Info Tyler Lira MD Go on 8/3/2018 Hospital follow-up scheduled at 9:45am ( If you have questions or need to reschedule please call [facility] 303 [facility] 57 
[phone] Your Scheduled Appointments 2018  9:45 AM EDT TRANSITIONAL CARE MANAGEMENT with Tyler Lira MD  
[address] [place] [address] and Primary Care [facility]) [address] [id number]  
[phone] Discharge Orders None A check kristie indicates which time of day the medication should be taken. My Medications START taking these medications Instructions Each Dose to Equal  
 Morning Noon Evening Bedtime  
 ondansetron 4 mg disintegrating tablet Commonly known as:  ZOFRAN ODT Your last dose was: Your next dose is: Take 1 Tab by mouth every eight (8) hours as needed for Nausea. 4 mg  
    
   
   
   
  
 promethazine 25 mg suppository Commonly known as:  PHENERGAN Your last dose was: Your next dose is: Insert 1 Suppository into rectum every six (6) hours as needed for Nausea. 25 mg CHANGE how you take these medications Instructions Each Dose to Equal  
 Morning Noon Evening Bedtime HYDROcodone-acetaminophen  mg tablet Commonly known as:  Ilene Punt  
 What changed:  reasons to take this Your last dose was: Your next dose is: Take 1 Tab by mouth every six (6) hours as needed. Max Daily Amount: 4 Tabs. Indications: sickl;e cell crisis 1 Tab CONTINUE taking these medications Instructions Each Dose to Equal  
 Morning Noon Evening Bedtime  
 citalopram 10 mg tablet Commonly known as:  Yariel Barrios Your last dose was: Your next dose is: TAKE 1 TABLET BY MOUTH EVERY NIGHT AT BEDTIME  
     
   
   
   
  
 cyclobenzaprine 10 mg tablet Commonly known as:  FLEXERIL Your last dose was: Your next dose is: Take 1 Tab by mouth three (3) times daily as needed for Muscle Spasm(s). 10 mg  
    
   
   
   
  
 fentaNYL 75 mcg/hr Commonly known as:  Jean Paul Mote Your last dose was: Your next dose is:    
   
   
 1 Patch by TransDERmal route every seventy-two (72) hours. Max Daily Amount: 1 Patch. 1 Patch  
    
   
   
   
  
 fluocinoNIDE 0.05 % topical cream  
Commonly known as:  LIDEX Your last dose was: Your next dose is:    
   
   
 two (2) times daily as needed. folic acid 1 mg tablet Commonly known as:  Google Your last dose was: Your next dose is: Take 1 Tab by mouth daily. 1 mg  
    
   
   
   
  
 losartan-hydroCHLOROthiazide 50-12.5 mg per tablet Commonly known as:  HYZAAR Your last dose was: Your next dose is: TAKE 1 TABLET BY MOUTH DAILY  
     
   
   
   
  
 OMEPRAZOLE PO Your last dose was: Your next dose is: Take 20 mg by mouth. 20 mg Where to Get Your Medications These medications were sent to [address] [facility], [address] [facility] [address] [address]  [address] [facility] Hours:  24-hours Phone:  [phone]  
  ondansetron 4 mg disintegrating tablet  
 promethazine 25 mg suppository Information on where to get these meds will be given to you by the nurse or doctor. ! Ask your nurse or doctor about these medications HYDROcodone-acetaminophen  mg tablet Opioid Education Prescription Opioids: What You Need to Know: 
 
Prescription opioids can be used to help relieve moderate-to-severe pain and are often prescribed following a surgery or injury, or for certain health conditions. These medications can be an important part of treatment but also come with serious risks. Opioids are strong pain medicines. Examples include hydrocodone, oxycodone, fentanyl, and morphine. Heroin is an example of an illegal opioid. It is important to work with your health care provider to make sure you are getting the safest, most effective care. WHAT ARE THE RISKS AND SIDE EFFECTS OF OPIOID USE? Prescription opioids carry serious risks of addiction and overdose, especially with prolonged use. An opioid overdose, often marked by slow breathing, can cause sudden death. The use of prescription opioids can have a number of side effects as well, even when taken as directed. · Tolerance-meaning you might need to take more of a medication for the same pain relief · Physical dependence-meaning you have symptoms of withdrawal when the medication is stopped. Withdrawal symptoms can include nausea, sweating, chills, diarrhea, stomach cramps, and muscle aches. Withdrawal can last up to several weeks, depending on which drug you took and how long you took it. · Increased sensitivity to pain · Constipation · Nausea, vomiting, and dry mouth · Sleepiness and dizziness · Confusion · Depression · Low levels of testosterone that can result in lower sex drive, energy, and strength · Itching and sweating RISKS ARE GREATER WITH:      
 · History of drug misuse, substance use disorder, or overdose · Mental health conditions (such as depression or anxiety) · Sleep apnea · Older age (72 years or older) · Pregnancy Avoid alcohol while taking prescription opioids. Also, unless specifically advised by your health care provider, medications to avoid include: · Benzodiazepines (such as Xanax or Valium) · Muscle relaxants (such as Soma or Flexeril) · Hypnotics (such as Ambien or Lunesta) · Other prescription opioids KNOW YOUR OPTIONS Talk to your health care provider about ways to manage your pain that don't involve prescription opioids. Some of these options may actually work better and have fewer risks and side effects. Options may include: 
· Pain relievers such as acetaminophen, ibuprofen, and naproxen · Some medications that are also used for depression or seizures · Physical therapy and exercise · Counseling to help patients learn how to cope better with triggers of pain and stress. · Application of heat or cold compress · Massage therapy · Relaxation techniques Be Informed Make sure you know the name of your medication, how much and how often to take it, and its potential risks & side effects. IF YOU ARE PRESCRIBED OPIOIDS FOR PAIN: 
· Never take opioids in greater amounts or more often than prescribed. Remember the goal is not to be pain-free but to manage your pain at a tolerable level. · Follow up with your primary care provider to: · Work together to create a plan on how to manage your pain. · Talk about ways to help manage your pain that don't involve prescription opioids. · Talk about any and all concerns and side effects. · Help prevent misuse and abuse. · Never sell or share prescription opioids · Help prevent misuse and abuse. · Store prescription opioids in a secure place and out of reach of others (this may include visitors, children, friends, and family). · Safely dispose of unused/unwanted prescription opioids: Find your community drug take-back program or your pharmacy mail-back program, or flush them down the toilet, following guidance from the Food and Drug Administration (www.fda.gov/Drugs/ResourcesForYou). · Visit www.cdc.gov/drugoverdose to learn about the risks of opioid abuse and overdose. · If you believe you may be struggling with addiction, tell your health care provider and ask for guidance or call [address] [place] [address] at [phone]. Discharge Instructions General Discharge Instructions Patient ID: Anthony Obregon [id number] 
56 y.o. 
1960 Patient Instructions The following personal items were collected during your admission and were returned to you. Take Home Medications What to do at [facility] Recommended diet: Regular Diet Recommended activity: Activity as tolerated Follow-up with Sabas Brantley MD  in 5 days. Information obtained by : 
I understand that if any problems occur once I am at home I am to contact my physician. I understand and acknowledge receipt of the instructions indicated above. Physician's or R.N.'s Signature                                                                  Date/Time Patient or Representative Signature                                                          Date/Time 
 
 
 
ACO Transitions of Care Introducing [employer] [address] Violet[address] Miller offers a voluntary care coordination program to provide high quality service and care to [facility] fee-for-service beneficiariesFrancisco Santos was designed to help you enhance your health and well-being through the following services: ? Transitions of Care  support for individuals who are transitioning from one care setting to another (example: Hospital to home). ? Chronic and Complex Care Coordination  support for individuals and caregivers of those with serious or chronic illnesses or with more than one chronic (ongoing) condition and those who take a number of different medications. If you meet specific medical criteria, a [address] [place] [address] Rd may call you directly to coordinate your care with your primary care physician and your other care providers. For questions about the [facility] programs, please, contact your physicians office. For general questions or additional information about Accountable Care Organizations: 
Please visit www.medicare.gov/acos. html or call 1-800-MEDICARE (2-559.998.4597) TTY users should call 1-392.180.4519. Mindshapes Announcement We are excited to announce that we are making your provider's discharge notes available to you in Mindshapes. You will see these notes when they are completed and signed by the physician that discharged you from your recent hospital stay. If you have any questions or concerns about any information you see in Mindshapes, please call the Health Information Department where you were seen or reach out to your Primary Care Provider for more information about your plan of care. Introducing [facility] HE[facility] SERVICES! Dear Carlos Mendoza: 
Thank you for requesting a Mindshapes account. Our records indicate that you already have an active Mindshapes account. You can access your account anytime at [email or web address] Light Did you know that you can access your hospital and ER discharge instructions at any time in Mindshapes? You can also review all of your test results from your hospital stay or ER visit. Additional Information If you have questions, please visit the Frequently Asked Questions section of the MyChart website at [email or web address] Remember, Yun Yunt is NOT to be used for urgent needs. For medical emergencies, dial 911. Now available from your iPhone and Android! Introducing Dusty Tolentino As a [facility] patient, I wanted to make you aware of our electronic visit tool called Dusty Tolentino. Embanet/Yuepu Sifang allows you to connect within minutes with a medical provider 24 hours a day, seven days a week via a mobile device or tablet or logging into a secure website from your computer. You can access Dusty Tolentino from anywhere in the [place]. A virtual visit might be right for you when you have a simple condition and feel like you just dont want to get out of bed, or cant get away from work for an appointment, when your regular [facility] provider is not available (evenings, weekends or holidays), or when youre out of town and need minor care. Electronic visits cost only $49 and if the Embanet/Yuepu Sifang provider determines a prescription is needed to treat your condition, one can be electronically transmitted to a nearby pharmacy*. Please take a moment to enroll today if you have not already done so. The enrollment process is free and takes just a few minutes. To enroll, please download the FitOrbit elizabeth to your tablet or phone, or visit [email or web address] to enroll on your computer. And, as an [address] [place] [address] patient with a ICEdot account, the results of your visits will be scanned into your electronic medical record and your primary care provider will be able to view the scanned results. We urge you to continue to see your regular [facility] provider for your ongoing medical care.   And while your primary care provider may not be the one available when you seek a Dusty Bellabert virtual visit, the peace of mind you get from getting a real diagnosis real time can be priceless. For more information on Dusty Mariefin, view our Frequently Asked Questions (FAQs) at [email or web address]. Sincerely, 
 
Noah Smith MD 
Chief Medical Officer Lucrecia Miller *:  certain medications cannot be prescribed via Dusty Bellabert Providers Seen During Your Hospitalization Provider Specialty Primary office phone Josue Friedman MD Emergency Medicine [phone] Cheryl Cao MD Internal Medicine [phone] Janis Salguero MD Internal Medicine [phone] Your Primary Care Physician (PCP) Primary Care Physician Office Phone Office Fax [address] [facility] [phone] You are allergic to the following Allergen Reactions Dilaudid (Hydromorphone (Bulk)) Itching Can tolerate with benadryl and ondansetron Percocet (Oxycodone-Acetaminophen) Itching Recent Documentation Height Weight BMI OB Status Smoking Status 1.803 m 85.7 kg 26.35 kg/m2 Postmenopausal Never Smoker Emergency Contacts Name Discharge Info Relation Home Work Mobile [address] [facility] CAREGIVER [3] Brother [24] [phone] Sherron Lowe DISCHARGE CAREGIVER [3] Daughter [21] [phone] Patient Belongings The following personal items are in your possession at time of discharge: 
     Visual Aid: Glasses Please provide this summary of care documentation to your next provider. Signatures-by signing, you are acknowledging that this After Visit Summary has been reviewed with you and you have received a copy. Patient Signature:  ____________________________________________________________ Date:  ____________________________________________________________ `    
    
 Provider Signature:  ____________________________________________________________ Date:  ____________________________________________________________

## 2018-07-15 NOTE — ROUTINE PROCESS
TRANSFER - OUT REPORT:    Verbal report given to Daniella Ruiz (name) on [facility]  being transferred to [address] (unit) for routine progression of care       Report consisted of patients Situation, Background, Assessment and   Recommendations(SBAR). Information from the following report(s) SBAR, Kardex, ED Summary, Intake/Output, MAR, Recent Results and Med Rec Status was reviewed with the receiving nurse. Lines:   Peripheral IV 07/15/18 Right Other(comment) (Active)   Site Assessment Clean, dry, & intact 7/15/2018  7:56 AM   Phlebitis Assessment 0 7/15/2018  7:56 AM   Infiltration Assessment 0 7/15/2018  7:56 AM   Dressing Status Clean, dry, & intact 7/15/2018  7:56 AM   Dressing Type Transparent 7/15/2018  7:56 AM   Hub Color/Line Status Pink 7/15/2018  7:56 AM        Opportunity for questions and clarification was provided.       Patient transported with:   Patient Transport

## 2018-07-16 ENCOUNTER — TELEPHONE (OUTPATIENT)
Dept: INTERNAL MEDICINE CLINIC | Age: 58
End: 2018-07-16

## 2018-07-16 LAB
ANION GAP SERPL CALC-SCNC: 6 MMOL/L (ref 5–15)
BASOPHILS # BLD: 0 K/UL (ref 0–0.1)
BASOPHILS NFR BLD: 0 % (ref 0–1)
BUN SERPL-MCNC: 9 MG/DL (ref 6–20)
BUN/CREAT SERPL: 8 (ref 12–20)
CALCIUM SERPL-MCNC: 8.2 MG/DL (ref 8.5–10.1)
CHLORIDE SERPL-SCNC: 110 MMOL/L (ref 97–108)
CO2 SERPL-SCNC: 25 MMOL/L (ref 21–32)
CREAT SERPL-MCNC: 1.2 MG/DL (ref 0.55–1.02)
DIFFERENTIAL METHOD BLD: ABNORMAL
EOSINOPHIL # BLD: 0 K/UL (ref 0–0.4)
EOSINOPHIL NFR BLD: 0 % (ref 0–7)
ERYTHROCYTE [DISTWIDTH] IN BLOOD BY AUTOMATED COUNT: 16 % (ref 11.5–14.5)
GLUCOSE BLD STRIP.AUTO-MCNC: 142 MG/DL (ref 65–100)
GLUCOSE BLD STRIP.AUTO-MCNC: 88 MG/DL (ref 65–100)
GLUCOSE BLD STRIP.AUTO-MCNC: 88 MG/DL (ref 65–100)
GLUCOSE BLD STRIP.AUTO-MCNC: 90 MG/DL (ref 65–100)
GLUCOSE SERPL-MCNC: 84 MG/DL (ref 65–100)
HCT VFR BLD AUTO: 19.6 % (ref 35–47)
HGB BLD-MCNC: 6.8 G/DL (ref 11.5–16)
IMM GRANULOCYTES # BLD: 0 K/UL (ref 0–0.04)
IMM GRANULOCYTES NFR BLD AUTO: 0 % (ref 0–0.5)
LYMPHOCYTES # BLD: 4.3 K/UL (ref 0.8–3.5)
LYMPHOCYTES NFR BLD: 34 % (ref 12–49)
MCH RBC QN AUTO: 33 PG (ref 26–34)
MCHC RBC AUTO-ENTMCNC: 34.7 G/DL (ref 30–36.5)
MCV RBC AUTO: 95.1 FL (ref 80–99)
MONOCYTES # BLD: 1.6 K/UL (ref 0–1)
MONOCYTES NFR BLD: 13 % (ref 5–13)
NEUTS SEG # BLD: 6.6 K/UL (ref 1.8–8)
NEUTS SEG NFR BLD: 53 % (ref 32–75)
NRBC # BLD: 0.12 K/UL (ref 0–0.01)
NRBC BLD-RTO: 1 PER 100 WBC
PLATELET # BLD AUTO: 185 K/UL (ref 150–400)
PMV BLD AUTO: 11.2 FL (ref 8.9–12.9)
POTASSIUM SERPL-SCNC: 3.9 MMOL/L (ref 3.5–5.1)
RBC # BLD AUTO: 2.06 M/UL (ref 3.8–5.2)
RBC MORPH BLD: ABNORMAL
RBC MORPH BLD: ABNORMAL
SERVICE CMNT-IMP: ABNORMAL
SERVICE CMNT-IMP: NORMAL
SODIUM SERPL-SCNC: 141 MMOL/L (ref 136–145)
WBC # BLD AUTO: 12.5 K/UL (ref 3.6–11)
WBC MORPH BLD: ABNORMAL

## 2018-07-16 PROCEDURE — 99218 HC RM OBSERVATION: CPT

## 2018-07-16 PROCEDURE — 85025 COMPLETE CBC W/AUTO DIFF WBC: CPT | Performed by: HOSPITALIST

## 2018-07-16 PROCEDURE — 74011250637 HC RX REV CODE- 250/637: Performed by: INTERNAL MEDICINE

## 2018-07-16 PROCEDURE — 74011250636 HC RX REV CODE- 250/636: Performed by: INTERNAL MEDICINE

## 2018-07-16 PROCEDURE — 36415 COLL VENOUS BLD VENIPUNCTURE: CPT | Performed by: HOSPITALIST

## 2018-07-16 PROCEDURE — 80048 BASIC METABOLIC PNL TOTAL CA: CPT | Performed by: HOSPITALIST

## 2018-07-16 PROCEDURE — 74011250637 HC RX REV CODE- 250/637: Performed by: HOSPITALIST

## 2018-07-16 PROCEDURE — 74011250636 HC RX REV CODE- 250/636: Performed by: HOSPITALIST

## 2018-07-16 PROCEDURE — 82962 GLUCOSE BLOOD TEST: CPT

## 2018-07-16 RX ORDER — DEXTROSE, SODIUM CHLORIDE, AND POTASSIUM CHLORIDE 5; .45; .15 G/100ML; G/100ML; G/100ML
50 INJECTION INTRAVENOUS CONTINUOUS
Status: DISCONTINUED | OUTPATIENT
Start: 2018-07-16 | End: 2018-07-20

## 2018-07-16 RX ORDER — FENTANYL 75 UG/H
1 PATCH TRANSDERMAL
Status: DISCONTINUED | OUTPATIENT
Start: 2018-07-16 | End: 2018-07-27 | Stop reason: HOSPADM

## 2018-07-16 RX ADMIN — FOLIC ACID 1 MG: 1 TABLET ORAL at 08:26

## 2018-07-16 RX ADMIN — SODIUM CHLORIDE AND POTASSIUM CHLORIDE: 9; 1.49 INJECTION, SOLUTION INTRAVENOUS at 06:52

## 2018-07-16 RX ADMIN — HYDROCODONE BITARTRATE AND ACETAMINOPHEN 1 TABLET: 10; 325 TABLET ORAL at 09:21

## 2018-07-16 RX ADMIN — ONDANSETRON 4 MG: 2 INJECTION INTRAMUSCULAR; INTRAVENOUS at 06:52

## 2018-07-16 RX ADMIN — HYDROMORPHONE HYDROCHLORIDE 1 MG: 2 INJECTION, SOLUTION INTRAMUSCULAR; INTRAVENOUS; SUBCUTANEOUS at 20:49

## 2018-07-16 RX ADMIN — HYDROMORPHONE HYDROCHLORIDE 1 MG: 2 INJECTION, SOLUTION INTRAMUSCULAR; INTRAVENOUS; SUBCUTANEOUS at 00:27

## 2018-07-16 RX ADMIN — HYDROCODONE BITARTRATE AND ACETAMINOPHEN 1 TABLET: 10; 325 TABLET ORAL at 01:56

## 2018-07-16 RX ADMIN — HEPARIN SODIUM 5000 UNITS: 5000 INJECTION INTRAVENOUS; SUBCUTANEOUS at 12:32

## 2018-07-16 RX ADMIN — HEPARIN SODIUM 5000 UNITS: 5000 INJECTION INTRAVENOUS; SUBCUTANEOUS at 04:29

## 2018-07-16 RX ADMIN — HEPARIN SODIUM 5000 UNITS: 5000 INJECTION INTRAVENOUS; SUBCUTANEOUS at 20:25

## 2018-07-16 RX ADMIN — Medication 5 ML: at 21:37

## 2018-07-16 RX ADMIN — ONDANSETRON 4 MG: 2 INJECTION INTRAMUSCULAR; INTRAVENOUS at 12:26

## 2018-07-16 RX ADMIN — ONDANSETRON 4 MG: 2 INJECTION INTRAMUSCULAR; INTRAVENOUS at 00:27

## 2018-07-16 RX ADMIN — CITALOPRAM HYDROBROMIDE 10 MG: 20 TABLET ORAL at 18:13

## 2018-07-16 RX ADMIN — SODIUM CHLORIDE AND POTASSIUM CHLORIDE: 9; 1.49 INJECTION, SOLUTION INTRAVENOUS at 15:52

## 2018-07-16 RX ADMIN — HYDROCODONE BITARTRATE AND ACETAMINOPHEN 1 TABLET: 10; 325 TABLET ORAL at 15:58

## 2018-07-16 RX ADMIN — DEXTROSE MONOHYDRATE, SODIUM CHLORIDE, AND POTASSIUM CHLORIDE 100 ML/HR: 50; 4.5; 1.49 INJECTION, SOLUTION INTRAVENOUS at 23:10

## 2018-07-16 RX ADMIN — HYDROMORPHONE HYDROCHLORIDE 1 MG: 2 INJECTION, SOLUTION INTRAMUSCULAR; INTRAVENOUS; SUBCUTANEOUS at 06:52

## 2018-07-16 RX ADMIN — HYDROMORPHONE HYDROCHLORIDE 1 MG: 2 INJECTION, SOLUTION INTRAMUSCULAR; INTRAVENOUS; SUBCUTANEOUS at 12:26

## 2018-07-16 RX ADMIN — Medication 10 ML: at 15:59

## 2018-07-16 NOTE — PROGRESS NOTES
Bedside and Verbal shift change report given to Rickey Rogers RN (oncoming nurse) by Alvarez Hernandes RN (offgoing nurse). Report included the following information SBAR, Kardex, Intake/Output, MAR and Recent Results.

## 2018-07-16 NOTE — PROGRESS NOTES
Problem: Falls - Risk of  Goal: *Absence of Falls  Document Mukund Fall Risk and appropriate interventions in the flowsheet.    Outcome: Progressing Towards Goal  Fall Risk Interventions:            Medication Interventions: Teach patient to arise slowly

## 2018-07-16 NOTE — PROGRESS NOTES
Bedside and Verbal shift change report given to Aleja (oncoming nurse) by Jose Avendaño RN (offgoing nurse). Report included the following information Kardex, MAR and Recent Results.

## 2018-07-16 NOTE — PROGRESS NOTES
Bedside shift change report given to SAMIR Lerner (oncoming nurse) by Lennox Hauser (offgoing nurse). Report included the following information SBAR, Kardex, Procedure Summary, Intake/Output, MAR, Accordion and Recent Results.

## 2018-07-16 NOTE — PROGRESS NOTES
Bedside and Verbal shift change report given to Aleja RN (oncoming nurse) by Sabina Lantigua RN (offgoing nurse). Report included the following information SBAR, Kardex, Intake/Output and MAR.

## 2018-07-17 PROBLEM — R11.10 VOMITING: Status: ACTIVE | Noted: 2018-07-17

## 2018-07-17 LAB
ALBUMIN SERPL-MCNC: 3.4 G/DL (ref 3.5–5)
ALBUMIN/GLOB SERPL: 0.8 {RATIO} (ref 1.1–2.2)
ALP SERPL-CCNC: 72 U/L (ref 45–117)
ALT SERPL-CCNC: 19 U/L (ref 12–78)
ANION GAP SERPL CALC-SCNC: 6 MMOL/L (ref 5–15)
AST SERPL-CCNC: 28 U/L (ref 15–37)
BASOPHILS # BLD: 0.1 K/UL (ref 0–0.1)
BASOPHILS NFR BLD: 1 % (ref 0–1)
BILIRUB SERPL-MCNC: 2.5 MG/DL (ref 0.2–1)
BLASTS NFR BLD MANUAL: 0 %
BUN SERPL-MCNC: 5 MG/DL (ref 6–20)
BUN/CREAT SERPL: 6 (ref 12–20)
CALCIUM SERPL-MCNC: 8.4 MG/DL (ref 8.5–10.1)
CHLORIDE SERPL-SCNC: 110 MMOL/L (ref 97–108)
CO2 SERPL-SCNC: 24 MMOL/L (ref 21–32)
CREAT SERPL-MCNC: 0.9 MG/DL (ref 0.55–1.02)
DIFFERENTIAL METHOD BLD: ABNORMAL
EOSINOPHIL # BLD: 0.1 K/UL (ref 0–0.4)
EOSINOPHIL NFR BLD: 1 % (ref 0–7)
ERYTHROCYTE [DISTWIDTH] IN BLOOD BY AUTOMATED COUNT: 16.1 % (ref 11.5–14.5)
GLOBULIN SER CALC-MCNC: 4.1 G/DL (ref 2–4)
GLUCOSE BLD STRIP.AUTO-MCNC: 141 MG/DL (ref 65–100)
GLUCOSE BLD STRIP.AUTO-MCNC: 144 MG/DL (ref 65–100)
GLUCOSE BLD STRIP.AUTO-MCNC: 95 MG/DL (ref 65–100)
GLUCOSE BLD STRIP.AUTO-MCNC: 97 MG/DL (ref 65–100)
GLUCOSE SERPL-MCNC: 87 MG/DL (ref 65–100)
HCT VFR BLD AUTO: 21.1 % (ref 35–47)
HGB BLD-MCNC: 7.1 G/DL (ref 11.5–16)
IMM GRANULOCYTES # BLD: 0 K/UL
IMM GRANULOCYTES NFR BLD AUTO: 0 %
LYMPHOCYTES # BLD: 6.7 K/UL (ref 0.8–3.5)
LYMPHOCYTES NFR BLD: 49 % (ref 12–49)
MCH RBC QN AUTO: 32.4 PG (ref 26–34)
MCHC RBC AUTO-ENTMCNC: 33.6 G/DL (ref 30–36.5)
MCV RBC AUTO: 96.3 FL (ref 80–99)
METAMYELOCYTES NFR BLD MANUAL: 0 %
MONOCYTES # BLD: 0.5 K/UL (ref 0–1)
MONOCYTES NFR BLD: 4 % (ref 5–13)
MYELOCYTES NFR BLD MANUAL: 0 %
NEUTS BAND NFR BLD MANUAL: 0 % (ref 0–6)
NEUTS SEG # BLD: 6 K/UL (ref 1.8–8)
NEUTS SEG NFR BLD: 45 % (ref 32–75)
NRBC # BLD: 0.31 K/UL (ref 0–0.01)
NRBC BLD-RTO: 2.3 PER 100 WBC
OTHER CELLS NFR BLD MANUAL: 0 %
PLATELET # BLD AUTO: 219 K/UL (ref 150–400)
PMV BLD AUTO: 11.9 FL (ref 8.9–12.9)
POTASSIUM SERPL-SCNC: 4.3 MMOL/L (ref 3.5–5.1)
PROMYELOCYTES NFR BLD MANUAL: 0 %
PROT SERPL-MCNC: 7.5 G/DL (ref 6.4–8.2)
RBC # BLD AUTO: 2.19 M/UL (ref 3.8–5.2)
RBC MORPH BLD: ABNORMAL
SERVICE CMNT-IMP: ABNORMAL
SERVICE CMNT-IMP: ABNORMAL
SERVICE CMNT-IMP: NORMAL
SERVICE CMNT-IMP: NORMAL
SODIUM SERPL-SCNC: 140 MMOL/L (ref 136–145)
WBC # BLD AUTO: 13.4 K/UL (ref 3.6–11)

## 2018-07-17 PROCEDURE — 74011250636 HC RX REV CODE- 250/636: Performed by: INTERNAL MEDICINE

## 2018-07-17 PROCEDURE — 74011250637 HC RX REV CODE- 250/637: Performed by: HOSPITALIST

## 2018-07-17 PROCEDURE — 82962 GLUCOSE BLOOD TEST: CPT

## 2018-07-17 PROCEDURE — 85027 COMPLETE CBC AUTOMATED: CPT | Performed by: INTERNAL MEDICINE

## 2018-07-17 PROCEDURE — 80053 COMPREHEN METABOLIC PANEL: CPT | Performed by: INTERNAL MEDICINE

## 2018-07-17 PROCEDURE — 99218 HC RM OBSERVATION: CPT

## 2018-07-17 PROCEDURE — 74011250636 HC RX REV CODE- 250/636: Performed by: HOSPITALIST

## 2018-07-17 PROCEDURE — 36415 COLL VENOUS BLD VENIPUNCTURE: CPT | Performed by: INTERNAL MEDICINE

## 2018-07-17 PROCEDURE — 65270000029 HC RM PRIVATE

## 2018-07-17 RX ADMIN — HYDROMORPHONE HYDROCHLORIDE 1 MG: 2 INJECTION, SOLUTION INTRAMUSCULAR; INTRAVENOUS; SUBCUTANEOUS at 22:42

## 2018-07-17 RX ADMIN — Medication 5 ML: at 05:25

## 2018-07-17 RX ADMIN — ONDANSETRON 4 MG: 2 INJECTION INTRAMUSCULAR; INTRAVENOUS at 18:22

## 2018-07-17 RX ADMIN — ONDANSETRON 4 MG: 2 INJECTION INTRAMUSCULAR; INTRAVENOUS at 06:23

## 2018-07-17 RX ADMIN — Medication 10 ML: at 13:43

## 2018-07-17 RX ADMIN — HEPARIN SODIUM 5000 UNITS: 5000 INJECTION INTRAVENOUS; SUBCUTANEOUS at 20:28

## 2018-07-17 RX ADMIN — HYDROMORPHONE HYDROCHLORIDE 1 MG: 2 INJECTION, SOLUTION INTRAMUSCULAR; INTRAVENOUS; SUBCUTANEOUS at 06:23

## 2018-07-17 RX ADMIN — HEPARIN SODIUM 5000 UNITS: 5000 INJECTION INTRAVENOUS; SUBCUTANEOUS at 13:42

## 2018-07-17 RX ADMIN — HYDROMORPHONE HYDROCHLORIDE 1 MG: 2 INJECTION, SOLUTION INTRAMUSCULAR; INTRAVENOUS; SUBCUTANEOUS at 19:22

## 2018-07-17 RX ADMIN — Medication 10 ML: at 18:22

## 2018-07-17 RX ADMIN — HYDROMORPHONE HYDROCHLORIDE 1 MG: 2 INJECTION, SOLUTION INTRAMUSCULAR; INTRAVENOUS; SUBCUTANEOUS at 11:43

## 2018-07-17 RX ADMIN — DIPHENHYDRAMINE HYDROCHLORIDE 25 MG: 25 SOLUTION ORAL at 20:28

## 2018-07-17 RX ADMIN — HEPARIN SODIUM 5000 UNITS: 5000 INJECTION INTRAVENOUS; SUBCUTANEOUS at 05:25

## 2018-07-17 RX ADMIN — CITALOPRAM HYDROBROMIDE 10 MG: 20 TABLET ORAL at 18:07

## 2018-07-17 RX ADMIN — DEXTROSE MONOHYDRATE, SODIUM CHLORIDE, AND POTASSIUM CHLORIDE 75 ML/HR: 50; 4.5; 1.49 INJECTION, SOLUTION INTRAVENOUS at 19:22

## 2018-07-17 RX ADMIN — HYDROMORPHONE HYDROCHLORIDE 1 MG: 2 INJECTION, SOLUTION INTRAMUSCULAR; INTRAVENOUS; SUBCUTANEOUS at 01:37

## 2018-07-17 RX ADMIN — HYDROMORPHONE HYDROCHLORIDE 1 MG: 2 INJECTION, SOLUTION INTRAMUSCULAR; INTRAVENOUS; SUBCUTANEOUS at 16:09

## 2018-07-17 RX ADMIN — Medication 5 ML: at 21:29

## 2018-07-17 RX ADMIN — DIPHENHYDRAMINE HYDROCHLORIDE 25 MG: 25 SOLUTION ORAL at 13:42

## 2018-07-17 RX ADMIN — FOLIC ACID 1 MG: 1 TABLET ORAL at 09:10

## 2018-07-17 NOTE — PHYSICIAN ADVISORY
Letter of Status Determination:   Recommend hospitalization status upgraded from   OBSERVATION  to INPATIENT  Status     Pt Name:  Jose David Scott   MR#   [address] [facility] # [id number] /  [id number]   [facility]#  [id number]   [address]  @ [facility]   Hospitalization date  7/15/2018 12:06 AM   Current Attending Physician  Giuliano Avila MD   Principal diagnosis  Intractable nausea and vomiting      Clinicals  56 y.o. y.o  female hospitalized with above diagnosis   The pt suffers from Sickle cell disease. She is now receiving Rx for persistent N/v and back pain. Specific sickle cell crisis has not been declared, but the pt has received IV narcotics through her stay here. Due to appropriate and necessary medical care, this pt's hospitalization has now exceeded two midnights . [place] ([facility]) criteria   Does  NOT apply    STATUS DETERMINATION  This patient is at above high risk of deterioration based on documented presenting clinical data, comorbid conditions, high risk of adverse events and current acute care course. Ms. Jose David Scott now meets Inpatient Admission status criteria in accordance with CMS regulation Section 43 .3. Specifically, due to medical necessity the patient's stay now exceeds Two Midnights. It is our recommendation that this patient's hospitalization status should be upgraded from  OBSERVATION to INPATIENT status.      The final decision of the patient's hospitalization status depends on the attending physician's judgment            Additional comments     Payor: Roly Moreno / Plan: [address] / Product Type: Medicare /         Amanda Pope MD MPH [address]   President Medical Staff, Miguelangel Mukherjee [facility]  [phone]        [phone]    .

## 2018-07-17 NOTE — PROGRESS NOTES
Bedside and Verbal shift change report given to Gerri Snellen (oncoming nurse) by Eugenia Multani (offgoing nurse). Report included the following information SBAR, Kardex, Intake/Output, MAR and Recent Results.

## 2018-07-17 NOTE — PROGRESS NOTES
General Daily Progress Note    Admit Date: 7/15/2018    Subjective:     Patient continues to complain of pain in neck back and shoulders. Current Facility-Administered Medications   Medication Dose Route Frequency    fentaNYL (DURAGESIC) 75 mcg/hr patch 1 Patch  1 Patch TransDERmal Q72H    dextrose 5% - 0.45% NaCl with KCl 20 mEq/L infusion  75 mL/hr IntraVENous CONTINUOUS    citalopram (CELEXA) tablet 10 mg  10 mg Oral QPM    folic acid (FOLVITE) tablet 1 mg  1 mg Oral DAILY    sodium chloride (NS) flush 5-10 mL  5-10 mL IntraVENous Q8H    sodium chloride (NS) flush 5-10 mL  5-10 mL IntraVENous PRN    heparin (porcine) injection 5,000 Units  5,000 Units SubCUTAneous Q8H    ondansetron (ZOFRAN) injection 4 mg  4 mg IntraVENous Q6H PRN    promethazine (PHENERGAN) 12.5 mg in 0.9% sodium chloride 50 mL IVPB  12.5 mg IntraVENous Q4H PRN    diphenhydrAMINE (BENADRYL) 12.5 mg/5 mL oral elixir 25 mg  25 mg Oral Q6H PRN    HYDROcodone-acetaminophen (NORCO)  mg tablet 1 Tab  1 Tab Oral Q4H PRN    HYDROmorphone (PF) (DILAUDID) injection 1 mg  1 mg IntraVENous Q3H PRN        Review of Systems  A comprehensive review of systems was negative.     Objective:     Patient Vitals for the past 24 hrs:   BP Temp Pulse Resp SpO2   07/17/18 0741 148/90 97.3 °F (36.3 °C) 77 14 99 %   07/16/18 2311 118/74 98.3 °F (36.8 °C) 78 18 98 %   07/16/18 1540 104/61 98.8 °F (37.1 °C) 75 18 97 %   07/16/18 1126 122/61 99.2 °F (37.3 °C) 85 18 98 %        07/15 1901 - 07/17 0700  In: 2204.2 [I.V.:2204.2]  Out: -     Physical Exam:   Visit Vitals    /90 (BP 1 Location: Right arm, BP Patient Position: Sitting)  Comment (BP Patient Position): Sitting up in bed & patient in pain    Pulse 77    Temp 97.3 °F (36.3 °C)    Resp 14    Ht 5' 11\" (1.803 m)    Wt 191 lb (86.6 kg)    SpO2 99%    BMI 26.64 kg/m2     General appearance: alert, cooperative, no distress, appears stated age  Neck: supple, symmetrical, trachea midline, no adenopathy, thyroid: not enlarged, symmetric, no tenderness/mass/nodules, no carotid bruit and no JVD  Lungs: clear to auscultation bilaterally  Heart: regular rate and rhythm, S1, S2 normal, no murmur, click, rub or gallop  Abdomen: soft, non-tender. Bowel sounds normal. No masses,  no organomegaly  Extremities: extremities normal, atraumatic, no cyanosis or edema        Data Review   Recent Results (from the past 24 hour(s))   GLUCOSE, POC    Collection Time: 07/16/18 11:27 AM   Result Value Ref Range    Glucose (POC) 88 65 - 100 mg/dL    Performed by Amanda Haji    GLUCOSE, POC    Collection Time: 07/16/18  4:29 PM   Result Value Ref Range    Glucose (POC) 88 65 - 100 mg/dL    Performed by Amanda Haji    GLUCOSE, POC    Collection Time: 07/16/18  9:48 PM   Result Value Ref Range    Glucose (POC) 90 65 - 100 mg/dL    Performed by Jessie Olivera    METABOLIC PANEL, COMPREHENSIVE    Collection Time: 07/17/18  1:39 AM   Result Value Ref Range    Sodium 140 136 - 145 mmol/L    Potassium 4.3 3.5 - 5.1 mmol/L    Chloride 110 (H) 97 - 108 mmol/L    CO2 24 21 - 32 mmol/L    Anion gap 6 5 - 15 mmol/L    Glucose 87 65 - 100 mg/dL    BUN 5 (L) 6 - 20 MG/DL    Creatinine 0.90 0.55 - 1.02 MG/DL    BUN/Creatinine ratio 6 (L) 12 - 20      GFR est AA >60 >60 ml/min/1.73m2    GFR est non-AA >60 >60 ml/min/1.73m2    Calcium 8.4 (L) 8.5 - 10.1 MG/DL    Bilirubin, total 2.5 (H) 0.2 - 1.0 MG/DL    ALT (SGPT) 19 12 - 78 U/L    AST (SGOT) 28 15 - 37 U/L    Alk.  phosphatase 72 45 - 117 U/L    Protein, total 7.5 6.4 - 8.2 g/dL    Albumin 3.4 (L) 3.5 - 5.0 g/dL    Globulin 4.1 (H) 2.0 - 4.0 g/dL    A-G Ratio 0.8 (L) 1.1 - 2.2     CBC WITH MANUAL DIFF    Collection Time: 07/17/18  1:39 AM   Result Value Ref Range    WBC 13.4 (H) 3.6 - 11.0 K/uL    RBC 2.19 (L) 3.80 - 5.20 M/uL    HGB 7.1 (L) 11.5 - 16.0 g/dL    HCT 21.1 (L) 35.0 - 47.0 %    MCV 96.3 80.0 - 99.0 FL    MCH 32.4 26.0 - 34.0 PG    MCHC 33.6 30.0 - 36.5 g/dL    RDW 16.1 (H) 11.5 - 14.5 %    PLATELET 159 201 - 571 K/uL    MPV 11.9 8.9 - 12.9 FL    NRBC 2.3 (H) 0  WBC    ABSOLUTE NRBC 0.31 (H) 0.00 - 0.01 K/uL    NEUTROPHILS 45 32 - 75 %    BAND NEUTROPHILS 0 0 - 6 %    LYMPHOCYTES 49 12 - 49 %    MONOCYTES 4 (L) 5 - 13 %    EOSINOPHILS 1 0 - 7 %    BASOPHILS 1 0 - 1 %    METAMYELOCYTES 0 0 %    MYELOCYTES 0 0 %    PROMYELOCYTES 0 0 %    BLASTS 0 0 %    OTHER CELL 0 0      IMMATURE GRANULOCYTES 0 %    ABS. NEUTROPHILS 6.0 1.8 - 8.0 K/UL    ABS. LYMPHOCYTES 6.7 (H) 0.8 - 3.5 K/UL    ABS. MONOCYTES 0.5 0.0 - 1.0 K/UL    ABS. EOSINOPHILS 0.1 0.0 - 0.4 K/UL    ABS. BASOPHILS 0.1 0.0 - 0.1 K/UL    ABS. IMM. GRANS. 0.0 K/UL    DF MANUAL      RBC COMMENTS ANISOCYTOSIS  1+        RBC COMMENTS TARGET CELLS  2+        RBC COMMENTS TEARDROP CELLS  PRESENT        RBC COMMENTS SCHISTOCYTES  1+        RBC COMMENTS OVALOCYTES  1+        RBC COMMENTS POLYCHROMASIA  1+       GLUCOSE, POC    Collection Time: 07/17/18  7:35 AM   Result Value Ref Range    Glucose (POC) 97 65 - 100 mg/dL    Performed by YOSELYN ASTORGA(C0N)            Assessment:     Principal Problem:    Intractable nausea and vomiting (7/15/2018)    Active Problems:    Nausea and vomiting (7/15/2018)        Plan:     1. Continue treatment for painful crises. 2.  GI symptoms resolved and will therefore progress diet.

## 2018-07-17 NOTE — PROGRESS NOTES
General Daily Progress Note    Admit Date: 7/15/2018    Subjective:     Patient complains of shoulder back and arm pain    Current Facility-Administered Medications   Medication Dose Route Frequency    dextrose 5 % - 0.45% NaCl 1,000 mL with potassium chloride 20 mEq infusion   IntraVENous CONTINUOUS    fentaNYL (DURAGESIC) 75 mcg/hr patch 1 Patch  1 Patch TransDERmal Q72H    citalopram (CELEXA) tablet 10 mg  10 mg Oral QPM    folic acid (FOLVITE) tablet 1 mg  1 mg Oral DAILY    sodium chloride (NS) flush 5-10 mL  5-10 mL IntraVENous Q8H    sodium chloride (NS) flush 5-10 mL  5-10 mL IntraVENous PRN    heparin (porcine) injection 5,000 Units  5,000 Units SubCUTAneous Q8H    ondansetron (ZOFRAN) injection 4 mg  4 mg IntraVENous Q6H PRN    promethazine (PHENERGAN) 12.5 mg in 0.9% sodium chloride 50 mL IVPB  12.5 mg IntraVENous Q4H PRN    diphenhydrAMINE (BENADRYL) 12.5 mg/5 mL oral elixir 25 mg  25 mg Oral Q6H PRN    HYDROcodone-acetaminophen (NORCO)  mg tablet 1 Tab  1 Tab Oral Q4H PRN    HYDROmorphone (PF) (DILAUDID) injection 1 mg  1 mg IntraVENous Q3H PRN        Review of Systems  A comprehensive review of systems was negative.     Objective:     Patient Vitals for the past 24 hrs:   BP Temp Pulse Resp SpO2   07/16/18 1540 104/61 98.8 °F (37.1 °C) 75 18 97 %   07/16/18 1126 122/61 99.2 °F (37.3 °C) 85 18 98 %   07/16/18 0755 115/66 98.8 °F (37.1 °C) 86 18 99 %   07/16/18 0030 132/69 99.1 °F (37.3 °C) 85 18 96 %     07/16 1901 - 07/17 0700  In: 597.9 [I.V.:597.9]  Out: -   07/15 0701 - 07/16 1900  In: 1606.3 [I.V.:1606.3]  Out: -     Physical Exam:   Visit Vitals    /61    Pulse 75    Temp 98.8 °F (37.1 °C)    Resp 18    Ht 5' 11\" (1.803 m)    Wt 191 lb (86.6 kg)    SpO2 97%    BMI 26.64 kg/m2     General appearance: alert, cooperative, no distress, appears stated age  Neck: supple, symmetrical, trachea midline, no adenopathy, thyroid: not enlarged, symmetric, no tenderness/mass/nodules, no carotid bruit and no JVD  Lungs: clear to auscultation bilaterally  Heart: regular rate and rhythm, S1, S2 normal, no murmur, click, rub or gallop  Abdomen: soft, non-tender. Bowel sounds normal. No masses,  no organomegaly  Extremities: extremities normal, atraumatic, no cyanosis or edema        Data Review   Recent Results (from the past 24 hour(s))   METABOLIC PANEL, BASIC    Collection Time: 07/16/18  3:08 AM   Result Value Ref Range    Sodium 141 136 - 145 mmol/L    Potassium 3.9 3.5 - 5.1 mmol/L    Chloride 110 (H) 97 - 108 mmol/L    CO2 25 21 - 32 mmol/L    Anion gap 6 5 - 15 mmol/L    Glucose 84 65 - 100 mg/dL    BUN 9 6 - 20 MG/DL    Creatinine 1.20 (H) 0.55 - 1.02 MG/DL    BUN/Creatinine ratio 8 (L) 12 - 20      GFR est AA 57 (L) >60 ml/min/1.73m2    GFR est non-AA 47 (L) >60 ml/min/1.73m2    Calcium 8.2 (L) 8.5 - 10.1 MG/DL   CBC WITH AUTOMATED DIFF    Collection Time: 07/16/18  3:08 AM   Result Value Ref Range    WBC 12.5 (H) 3.6 - 11.0 K/uL    RBC 2.06 (L) 3.80 - 5.20 M/uL    HGB 6.8 (L) 11.5 - 16.0 g/dL    HCT 19.6 (L) 35.0 - 47.0 %    MCV 95.1 80.0 - 99.0 FL    MCH 33.0 26.0 - 34.0 PG    MCHC 34.7 30.0 - 36.5 g/dL    RDW 16.0 (H) 11.5 - 14.5 %    PLATELET 867 492 - 363 K/uL    MPV 11.2 8.9 - 12.9 FL    NRBC 1.0 (H) 0  WBC    ABSOLUTE NRBC 0.12 (H) 0.00 - 0.01 K/uL    NEUTROPHILS 53 32 - 75 %    LYMPHOCYTES 34 12 - 49 %    MONOCYTES 13 5 - 13 %    EOSINOPHILS 0 0 - 7 %    BASOPHILS 0 0 - 1 %    IMMATURE GRANULOCYTES 0 0.0 - 0.5 %    ABS. NEUTROPHILS 6.6 1.8 - 8.0 K/UL    ABS. LYMPHOCYTES 4.3 (H) 0.8 - 3.5 K/UL    ABS. MONOCYTES 1.6 (H) 0.0 - 1.0 K/UL    ABS. EOSINOPHILS 0.0 0.0 - 0.4 K/UL    ABS. BASOPHILS 0.0 0.0 - 0.1 K/UL    ABS. IMM.  GRANS. 0.0 0.00 - 0.04 K/UL    DF AUTOMATED      RBC COMMENTS ANISOCYTOSIS  1+        RBC COMMENTS MACROCYTOSIS  1+        WBC COMMENTS TARGET CELLS     GLUCOSE, POC    Collection Time: 07/16/18  7:53 AM   Result Value Ref Range Glucose (POC) 142 (H) 65 - 100 mg/dL    Performed by Bhumika Riggs, POC    Collection Time: 07/16/18 11:27 AM   Result Value Ref Range    Glucose (POC) 88 65 - 100 mg/dL    Performed by Baldo Smith    GLUCOSE, POC    Collection Time: 07/16/18  4:29 PM   Result Value Ref Range    Glucose (POC) 88 65 - 100 mg/dL    Performed by Baldo Smith            Assessment:     Principal Problem:    Intractable nausea and vomiting (7/15/2018)    Active Problems:    Nausea and vomiting (7/15/2018)        Plan:     1. Acute gastritis induced by consuming foods appears to be improving. We will progress diet. 2.  Painful crisis has ensued since the GI diathesis. Continue with pain control until resolution of crisis occurs. 3.  Will mobilize.

## 2018-07-18 LAB
GLUCOSE BLD STRIP.AUTO-MCNC: 104 MG/DL (ref 65–100)
GLUCOSE BLD STRIP.AUTO-MCNC: 108 MG/DL (ref 65–100)
GLUCOSE BLD STRIP.AUTO-MCNC: 124 MG/DL (ref 65–100)
GLUCOSE BLD STRIP.AUTO-MCNC: 97 MG/DL (ref 65–100)
SERVICE CMNT-IMP: ABNORMAL
SERVICE CMNT-IMP: NORMAL

## 2018-07-18 PROCEDURE — 65270000029 HC RM PRIVATE

## 2018-07-18 PROCEDURE — 74011250636 HC RX REV CODE- 250/636: Performed by: INTERNAL MEDICINE

## 2018-07-18 PROCEDURE — 74011250637 HC RX REV CODE- 250/637: Performed by: HOSPITALIST

## 2018-07-18 PROCEDURE — 74011250636 HC RX REV CODE- 250/636: Performed by: HOSPITALIST

## 2018-07-18 PROCEDURE — 82962 GLUCOSE BLOOD TEST: CPT

## 2018-07-18 RX ADMIN — HYDROMORPHONE HYDROCHLORIDE 1 MG: 2 INJECTION, SOLUTION INTRAMUSCULAR; INTRAVENOUS; SUBCUTANEOUS at 05:55

## 2018-07-18 RX ADMIN — DIPHENHYDRAMINE HYDROCHLORIDE 25 MG: 25 SOLUTION ORAL at 22:05

## 2018-07-18 RX ADMIN — HYDROMORPHONE HYDROCHLORIDE 1 MG: 2 INJECTION, SOLUTION INTRAMUSCULAR; INTRAVENOUS; SUBCUTANEOUS at 14:45

## 2018-07-18 RX ADMIN — HEPARIN SODIUM 5000 UNITS: 5000 INJECTION INTRAVENOUS; SUBCUTANEOUS at 14:45

## 2018-07-18 RX ADMIN — HEPARIN SODIUM 5000 UNITS: 5000 INJECTION INTRAVENOUS; SUBCUTANEOUS at 04:20

## 2018-07-18 RX ADMIN — DIPHENHYDRAMINE HYDROCHLORIDE 25 MG: 25 SOLUTION ORAL at 05:56

## 2018-07-18 RX ADMIN — HYDROMORPHONE HYDROCHLORIDE 1 MG: 2 INJECTION, SOLUTION INTRAMUSCULAR; INTRAVENOUS; SUBCUTANEOUS at 18:49

## 2018-07-18 RX ADMIN — FOLIC ACID 1 MG: 1 TABLET ORAL at 10:02

## 2018-07-18 RX ADMIN — ONDANSETRON 4 MG: 2 INJECTION INTRAMUSCULAR; INTRAVENOUS at 14:46

## 2018-07-18 RX ADMIN — Medication 10 ML: at 14:46

## 2018-07-18 RX ADMIN — HEPARIN SODIUM 5000 UNITS: 5000 INJECTION INTRAVENOUS; SUBCUTANEOUS at 20:53

## 2018-07-18 RX ADMIN — CITALOPRAM HYDROBROMIDE 10 MG: 20 TABLET ORAL at 17:26

## 2018-07-18 RX ADMIN — DEXTROSE MONOHYDRATE, SODIUM CHLORIDE, AND POTASSIUM CHLORIDE 75 ML/HR: 50; 4.5; 1.49 INJECTION, SOLUTION INTRAVENOUS at 22:46

## 2018-07-18 RX ADMIN — Medication 5 ML: at 21:22

## 2018-07-18 RX ADMIN — DEXTROSE MONOHYDRATE, SODIUM CHLORIDE, AND POTASSIUM CHLORIDE 75 ML/HR: 50; 4.5; 1.49 INJECTION, SOLUTION INTRAVENOUS at 09:28

## 2018-07-18 RX ADMIN — HYDROMORPHONE HYDROCHLORIDE 1 MG: 2 INJECTION, SOLUTION INTRAMUSCULAR; INTRAVENOUS; SUBCUTANEOUS at 22:07

## 2018-07-18 RX ADMIN — Medication 5 ML: at 05:39

## 2018-07-18 RX ADMIN — HYDROMORPHONE HYDROCHLORIDE 1 MG: 2 INJECTION, SOLUTION INTRAMUSCULAR; INTRAVENOUS; SUBCUTANEOUS at 03:00

## 2018-07-18 RX ADMIN — HYDROMORPHONE HYDROCHLORIDE 1 MG: 2 INJECTION, SOLUTION INTRAMUSCULAR; INTRAVENOUS; SUBCUTANEOUS at 10:01

## 2018-07-18 NOTE — PROGRESS NOTES
Bedside and Verbal shift change report given to Venancio Winston (oncoming nurse) by Kylie Best RN (offgoing nurse). Report included the following information Kardex, MAR and Recent Results.

## 2018-07-18 NOTE — PROGRESS NOTES
General Daily Progress Note    Admit Date: 7/15/2018    Subjective:     Patient continues to complain of pain but improving. Current Facility-Administered Medications   Medication Dose Route Frequency    fentaNYL (DURAGESIC) 75 mcg/hr patch 1 Patch  1 Patch TransDERmal Q72H    dextrose 5% - 0.45% NaCl with KCl 20 mEq/L infusion  75 mL/hr IntraVENous CONTINUOUS    citalopram (CELEXA) tablet 10 mg  10 mg Oral QPM    folic acid (FOLVITE) tablet 1 mg  1 mg Oral DAILY    sodium chloride (NS) flush 5-10 mL  5-10 mL IntraVENous Q8H    sodium chloride (NS) flush 5-10 mL  5-10 mL IntraVENous PRN    heparin (porcine) injection 5,000 Units  5,000 Units SubCUTAneous Q8H    ondansetron (ZOFRAN) injection 4 mg  4 mg IntraVENous Q6H PRN    promethazine (PHENERGAN) 12.5 mg in 0.9% sodium chloride 50 mL IVPB  12.5 mg IntraVENous Q4H PRN    diphenhydrAMINE (BENADRYL) 12.5 mg/5 mL oral elixir 25 mg  25 mg Oral Q6H PRN    HYDROcodone-acetaminophen (NORCO)  mg tablet 1 Tab  1 Tab Oral Q4H PRN    HYDROmorphone (PF) (DILAUDID) injection 1 mg  1 mg IntraVENous Q3H PRN        Review of Systems  A comprehensive review of systems was negative. Objective:     Patient Vitals for the past 24 hrs:   BP Temp Pulse Resp SpO2   07/18/18 0900 137/76 98.9 °F (37.2 °C) 73 18 97 %   07/17/18 2255 138/78 97.7 °F (36.5 °C) 79 18 99 %   07/17/18 2003 130/72 97.6 °F (36.4 °C) 79 18 99 %   07/17/18 1511 145/78 98.6 °F (37 °C) 85 16 100 %   07/17/18 1240 143/78 96.6 °F (35.9 °C) 79 16 100 %        07/16 1901 - 07/18 0700  In: 717.9 [P.O.:120;  I.V.:597.9]  Out: 600 [Urine:600]    Physical Exam:   Visit Vitals    /76    Pulse 73    Temp 98.9 °F (37.2 °C)    Resp 18    Ht 5' 11\" (1.803 m)    Wt 191 lb (86.6 kg)    SpO2 97%    BMI 26.64 kg/m2     General appearance: alert, cooperative, no distress, appears stated age  Neck: supple, symmetrical, trachea midline, no adenopathy, thyroid: not enlarged, symmetric, no tenderness/mass/nodules, no carotid bruit and no JVD  Lungs: clear to auscultation bilaterally  Heart: regular rate and rhythm, S1, S2 normal, no murmur, click, rub or gallop  Abdomen: soft, non-tender. Bowel sounds normal. No masses,  no organomegaly  Extremities: extremities normal, atraumatic, no cyanosis or edema        Data Review   Recent Results (from the past 24 hour(s))   GLUCOSE, POC    Collection Time: 07/17/18 12:32 PM   Result Value Ref Range    Glucose (POC) 144 (H) 65 - 100 mg/dL    Performed by Waldo Patiño, POC    Collection Time: 07/17/18  5:20 PM   Result Value Ref Range    Glucose (POC) 141 (H) 65 - 100 mg/dL    Performed by Waldo Patiño, POC    Collection Time: 07/17/18  8:41 PM   Result Value Ref Range    Glucose (POC) 95 65 - 100 mg/dL    Performed by Nani XIAO    GLUCOSE, POC    Collection Time: 07/18/18  8:29 AM   Result Value Ref Range    Glucose (POC) 104 (H) 65 - 100 mg/dL    Performed by Diana Abrams            Assessment:     Principal Problem:    Intractable nausea and vomiting (7/15/2018)    Active Problems:    Nausea and vomiting (7/15/2018)      Vomiting (7/17/2018)        Plan:     1. Continue treatment of painful crises. 2.  We will progress diet with resolution of gastritis. 3.  Hydration is adequate.

## 2018-07-18 NOTE — PROGRESS NOTES
Bedside shift change report given to [address] [place] [address] (oncoming nurse) by Vale Castillo (offgoing nurse). Report included the following information SBAR, Kardex, Intake/Output, MAR and Accordion.

## 2018-07-18 NOTE — PROGRESS NOTES
Pt admitted with nausea//vomiting/pain in neck//shoulders. Pt lives with dtr, is an ACO pt and would benefit from [facility], may benefit from [address] consult to assess pain control, has Medicare//BC, full code, and PCP Dr Kenyatta Guardado. Was here in [place] with sickle cell crisis. Note sent to nurse navigator. Please order PT consult to assess mobility//DME needs. Care Management Interventions  PCP Verified by CM:  Yes  Transition of Care Consult (CM Consult): Discharge Planning  MyChart Signup: No  Discharge Durable Medical Equipment: No  Health Maintenance Reviewed: Yes  Physical Therapy Consult: No  Occupational Therapy Consult: No  Speech Therapy Consult: No  Current Support Network: Lives with Caregiver  Confirm Follow Up Transport: Family  Plan discussed with Pt/Family/Caregiver: Yes  Freedom of Choice Offered: Yes  Discharge Location  Discharge Placement: Home

## 2018-07-18 NOTE — PROGRESS NOTES
Problem: Falls - Risk of  Goal: *Absence of Falls  Document Mukund Fall Risk and appropriate interventions in the flowsheet.    Outcome: Progressing Towards Goal  Fall Risk Interventions:            Medication Interventions: Bed/chair exit alarm

## 2018-07-18 NOTE — CDMP QUERY
Please clarify if this patient is (was) being treated/managed for:     =>Sickle cell crisis POA in the setting of  low back pain, ANASTASIIA, Hgb 6.8 requiring aggressive hydration with isotonic fluid and iv dilaudid  => Other explanation of clinical findings  => Clinically Undetermined (no explanation for clinical findings)    The medical record reflects the following clinical findings, treatment, and risk factors. Risk Factors: sickle cell disease  Clinical Indicators:  c/o low back pain, AANSTASIIA, PN 7/16 documented \"Painful crisis has ensued since the GI diathesis. Continue with pain control until resolution of crisis occurs\"  Treatment: iv fluids, iv dilaudid, monitoring    Please clarify and document your clinical opinion in the progress notes and discharge summary including the definitive and/or presumptive diagnosis, (suspected or probable), related to the above clinical findings. Please include clinical findings supporting your diagnosis.   Thank [address] [place] [address], [address] [place] [address] Rd     [phone]

## 2018-07-18 NOTE — PROGRESS NOTES
Bedside and Verbal shift change report given to Jimmy Ly (oncoming nurse) by Aleksey Wong (offgoing nurse). Report included the following information SBAR, Kardex, Intake/Output, MAR and Recent Results.

## 2018-07-18 NOTE — PROGRESS NOTES
Bedside and Verbal shift change report given to Aleja (oncoming nurse) by Eilse Morillo RN (offgoing nurse). Report included the following information Kardex and MAR.

## 2018-07-18 NOTE — PROGRESS NOTES
Problem: Falls - Risk of  Goal: *Absence of Falls  Document Mukund Fall Risk and appropriate interventions in the flowsheet.    Outcome: Progressing Towards Goal  Fall Risk Interventions:            Medication Interventions: Bed/chair exit alarm, Patient to call before getting OOB         History of Falls Interventions: Room close to nurse's station

## 2018-07-19 LAB
ANION GAP SERPL CALC-SCNC: 5 MMOL/L (ref 5–15)
BASOPHILS # BLD: 0 K/UL (ref 0–0.1)
BASOPHILS NFR BLD: 0 % (ref 0–1)
BLASTS NFR BLD MANUAL: 0 %
BUN SERPL-MCNC: 3 MG/DL (ref 6–20)
BUN/CREAT SERPL: 4 (ref 12–20)
CALCIUM SERPL-MCNC: 8.4 MG/DL (ref 8.5–10.1)
CHLORIDE SERPL-SCNC: 107 MMOL/L (ref 97–108)
CO2 SERPL-SCNC: 27 MMOL/L (ref 21–32)
CREAT SERPL-MCNC: 0.78 MG/DL (ref 0.55–1.02)
EOSINOPHIL # BLD: 0.3 K/UL (ref 0–0.4)
EOSINOPHIL NFR BLD: 4 % (ref 0–7)
ERYTHROCYTE [DISTWIDTH] IN BLOOD BY AUTOMATED COUNT: 16.6 % (ref 11.5–14.5)
GLUCOSE BLD STRIP.AUTO-MCNC: 100 MG/DL (ref 65–100)
GLUCOSE BLD STRIP.AUTO-MCNC: 103 MG/DL (ref 65–100)
GLUCOSE BLD STRIP.AUTO-MCNC: 113 MG/DL (ref 65–100)
GLUCOSE SERPL-MCNC: 91 MG/DL (ref 65–100)
HCT VFR BLD AUTO: 19.9 % (ref 35–47)
HGB BLD-MCNC: 6.8 G/DL (ref 11.5–16)
IMM GRANULOCYTES # BLD: 0 K/UL
IMM GRANULOCYTES NFR BLD AUTO: 0 %
LYMPHOCYTES # BLD: 4 K/UL (ref 0.8–3.5)
LYMPHOCYTES NFR BLD: 51 % (ref 12–49)
MCH RBC QN AUTO: 33.2 PG (ref 26–34)
MCHC RBC AUTO-ENTMCNC: 34.2 G/DL (ref 30–36.5)
MCV RBC AUTO: 97.1 FL (ref 80–99)
METAMYELOCYTES NFR BLD MANUAL: 0 %
MONOCYTES # BLD: 0.5 K/UL (ref 0–1)
MONOCYTES NFR BLD: 7 % (ref 5–13)
MYELOCYTES NFR BLD MANUAL: 0 %
NEUTS BAND NFR BLD MANUAL: 0 % (ref 0–6)
NEUTS SEG # BLD: 3 K/UL (ref 1.8–8)
NEUTS SEG NFR BLD: 38 % (ref 32–75)
NRBC # BLD: 0.23 K/UL (ref 0–0.01)
NRBC BLD-RTO: 3 PER 100 WBC
OTHER CELLS NFR BLD MANUAL: 0 %
PLATELET # BLD AUTO: 198 K/UL (ref 150–400)
PMV BLD AUTO: 12.3 FL (ref 8.9–12.9)
POTASSIUM SERPL-SCNC: 4.1 MMOL/L (ref 3.5–5.1)
PROMYELOCYTES NFR BLD MANUAL: 0 %
RBC # BLD AUTO: 2.05 M/UL (ref 3.8–5.2)
RBC MORPH BLD: ABNORMAL
SERVICE CMNT-IMP: ABNORMAL
SERVICE CMNT-IMP: ABNORMAL
SERVICE CMNT-IMP: NORMAL
SODIUM SERPL-SCNC: 139 MMOL/L (ref 136–145)
WBC # BLD AUTO: 7.8 K/UL (ref 3.6–11)

## 2018-07-19 PROCEDURE — 74011250637 HC RX REV CODE- 250/637: Performed by: INTERNAL MEDICINE

## 2018-07-19 PROCEDURE — 80048 BASIC METABOLIC PNL TOTAL CA: CPT | Performed by: INTERNAL MEDICINE

## 2018-07-19 PROCEDURE — 85027 COMPLETE CBC AUTOMATED: CPT | Performed by: INTERNAL MEDICINE

## 2018-07-19 PROCEDURE — 65270000029 HC RM PRIVATE

## 2018-07-19 PROCEDURE — 74011250636 HC RX REV CODE- 250/636: Performed by: HOSPITALIST

## 2018-07-19 PROCEDURE — 36415 COLL VENOUS BLD VENIPUNCTURE: CPT | Performed by: INTERNAL MEDICINE

## 2018-07-19 PROCEDURE — 74011250637 HC RX REV CODE- 250/637: Performed by: HOSPITALIST

## 2018-07-19 PROCEDURE — 82962 GLUCOSE BLOOD TEST: CPT

## 2018-07-19 PROCEDURE — 74011250636 HC RX REV CODE- 250/636: Performed by: INTERNAL MEDICINE

## 2018-07-19 RX ORDER — HYDROMORPHONE HYDROCHLORIDE 2 MG/ML
0.5 INJECTION, SOLUTION INTRAMUSCULAR; INTRAVENOUS; SUBCUTANEOUS
Status: DISCONTINUED | OUTPATIENT
Start: 2018-07-19 | End: 2018-07-20

## 2018-07-19 RX ADMIN — HYDROMORPHONE HYDROCHLORIDE 1 MG: 2 INJECTION, SOLUTION INTRAMUSCULAR; INTRAVENOUS; SUBCUTANEOUS at 04:50

## 2018-07-19 RX ADMIN — HEPARIN SODIUM 5000 UNITS: 5000 INJECTION INTRAVENOUS; SUBCUTANEOUS at 13:43

## 2018-07-19 RX ADMIN — HEPARIN SODIUM 5000 UNITS: 5000 INJECTION INTRAVENOUS; SUBCUTANEOUS at 21:00

## 2018-07-19 RX ADMIN — Medication 5 ML: at 21:08

## 2018-07-19 RX ADMIN — HYDROMORPHONE HYDROCHLORIDE 1 MG: 2 INJECTION, SOLUTION INTRAMUSCULAR; INTRAVENOUS; SUBCUTANEOUS at 08:13

## 2018-07-19 RX ADMIN — Medication 10 ML: at 13:41

## 2018-07-19 RX ADMIN — HYDROCODONE BITARTRATE AND ACETAMINOPHEN 1 TABLET: 10; 325 TABLET ORAL at 16:20

## 2018-07-19 RX ADMIN — DEXTROSE MONOHYDRATE, SODIUM CHLORIDE, AND POTASSIUM CHLORIDE 50 ML/HR: 50; 4.5; 1.49 INJECTION, SOLUTION INTRAVENOUS at 11:16

## 2018-07-19 RX ADMIN — Medication 5 ML: at 05:14

## 2018-07-19 RX ADMIN — ONDANSETRON 4 MG: 2 INJECTION INTRAMUSCULAR; INTRAVENOUS at 13:40

## 2018-07-19 RX ADMIN — DIPHENHYDRAMINE HYDROCHLORIDE 25 MG: 25 SOLUTION ORAL at 13:40

## 2018-07-19 RX ADMIN — HYDROMORPHONE HYDROCHLORIDE 0.5 MG: 2 INJECTION, SOLUTION INTRAMUSCULAR; INTRAVENOUS; SUBCUTANEOUS at 21:00

## 2018-07-19 RX ADMIN — HYDROMORPHONE HYDROCHLORIDE 0.5 MG: 2 INJECTION, SOLUTION INTRAMUSCULAR; INTRAVENOUS; SUBCUTANEOUS at 11:18

## 2018-07-19 RX ADMIN — FOLIC ACID 1 MG: 1 TABLET ORAL at 08:13

## 2018-07-19 RX ADMIN — HEPARIN SODIUM 5000 UNITS: 5000 INJECTION INTRAVENOUS; SUBCUTANEOUS at 05:14

## 2018-07-19 RX ADMIN — ONDANSETRON 4 MG: 2 INJECTION INTRAMUSCULAR; INTRAVENOUS at 19:25

## 2018-07-19 RX ADMIN — DIPHENHYDRAMINE HYDROCHLORIDE 25 MG: 25 SOLUTION ORAL at 19:25

## 2018-07-19 RX ADMIN — HYDROMORPHONE HYDROCHLORIDE 1 MG: 2 INJECTION, SOLUTION INTRAMUSCULAR; INTRAVENOUS; SUBCUTANEOUS at 01:05

## 2018-07-19 RX ADMIN — HYDROCODONE BITARTRATE AND ACETAMINOPHEN 1 TABLET: 10; 325 TABLET ORAL at 11:17

## 2018-07-19 RX ADMIN — CITALOPRAM HYDROBROMIDE 10 MG: 20 TABLET ORAL at 19:25

## 2018-07-19 NOTE — PROGRESS NOTES
Bedside and Verbal shift change report given to Grace Hung RN (oncoming nurse) by Shari Guzmán (offgoing nurse). Report given with SBAR, Kardex, Intake/Output, MAR and Recent Results.

## 2018-07-19 NOTE — PROGRESS NOTES
General Daily Progress Note    Admit Date: 7/15/2018    Subjective:     Patient continues to complain of pain but improving significantly. Current Facility-Administered Medications   Medication Dose Route Frequency    HYDROmorphone (PF) (DILAUDID) injection 0.5 mg  0.5 mg IntraVENous BID PRN    fentaNYL (DURAGESIC) 75 mcg/hr patch 1 Patch  1 Patch TransDERmal Q72H    dextrose 5% - 0.45% NaCl with KCl 20 mEq/L infusion  50 mL/hr IntraVENous CONTINUOUS    citalopram (CELEXA) tablet 10 mg  10 mg Oral QPM    folic acid (FOLVITE) tablet 1 mg  1 mg Oral DAILY    sodium chloride (NS) flush 5-10 mL  5-10 mL IntraVENous Q8H    sodium chloride (NS) flush 5-10 mL  5-10 mL IntraVENous PRN    heparin (porcine) injection 5,000 Units  5,000 Units SubCUTAneous Q8H    ondansetron (ZOFRAN) injection 4 mg  4 mg IntraVENous Q6H PRN    promethazine (PHENERGAN) 12.5 mg in 0.9% sodium chloride 50 mL IVPB  12.5 mg IntraVENous Q4H PRN    diphenhydrAMINE (BENADRYL) 12.5 mg/5 mL oral elixir 25 mg  25 mg Oral Q6H PRN    HYDROcodone-acetaminophen (NORCO)  mg tablet 1 Tab  1 Tab Oral Q4H PRN        Review of Systems  A comprehensive review of systems was negative.     Objective:     Patient Vitals for the past 24 hrs:   BP Temp Pulse Resp SpO2   07/19/18 0723 135/78 98.2 °F (36.8 °C) 70 12 99 %   07/18/18 2226 147/72 98.4 °F (36.9 °C) 82 16 99 %   07/18/18 1628 137/78 98.5 °F (36.9 °C) 79 18 99 %        07/17 1901 - 07/19 0700  In: 540 [P.O.:540]  Out: -     Physical Exam:   Visit Vitals    /78 (BP 1 Location: Right arm, BP Patient Position: At rest)    Pulse 70    Temp 98.2 °F (36.8 °C)    Resp 12    Ht 5' 11\" (1.803 m)    Wt 191 lb (86.6 kg)    SpO2 99%    BMI 26.64 kg/m2     General appearance: alert, cooperative, no distress, appears stated age  Neck: supple, symmetrical, trachea midline, no adenopathy, thyroid: not enlarged, symmetric, no tenderness/mass/nodules, no carotid bruit and no JVD  Lungs: clear to auscultation bilaterally  Heart: regular rate and rhythm, S1, S2 normal, no murmur, click, rub or gallop  Abdomen: soft, non-tender.  Bowel sounds normal. No masses,  no organomegaly  Extremities: extremities normal, atraumatic, no cyanosis or edema        Data Review   Recent Results (from the past 24 hour(s))   GLUCOSE, POC    Collection Time: 07/18/18 11:33 AM   Result Value Ref Range    Glucose (POC) 108 (H) 65 - 100 mg/dL    Performed by Niels Leonardo    GLUCOSE, POC    Collection Time: 07/18/18  4:30 PM   Result Value Ref Range    Glucose (POC) 97 65 - 100 mg/dL    Performed by Niels Leonardo    GLUCOSE, POC    Collection Time: 07/18/18  8:39 PM   Result Value Ref Range    Glucose (POC) 124 (H) 65 - 100 mg/dL    Performed by Sophia Kaminski    METABOLIC PANEL, BASIC    Collection Time: 07/19/18  1:14 AM   Result Value Ref Range    Sodium 139 136 - 145 mmol/L    Potassium 4.1 3.5 - 5.1 mmol/L    Chloride 107 97 - 108 mmol/L    CO2 27 21 - 32 mmol/L    Anion gap 5 5 - 15 mmol/L    Glucose 91 65 - 100 mg/dL    BUN 3 (L) 6 - 20 MG/DL    Creatinine 0.78 0.55 - 1.02 MG/DL    BUN/Creatinine ratio 4 (L) 12 - 20      GFR est AA >60 >60 ml/min/1.73m2    GFR est non-AA >60 >60 ml/min/1.73m2    Calcium 8.4 (L) 8.5 - 10.1 MG/DL   CBC WITH MANUAL DIFF    Collection Time: 07/19/18  1:14 AM   Result Value Ref Range    WBC 7.8 3.6 - 11.0 K/uL    RBC 2.05 (L) 3.80 - 5.20 M/uL    HGB 6.8 (L) 11.5 - 16.0 g/dL    HCT 19.9 (L) 35.0 - 47.0 %    MCV 97.1 80.0 - 99.0 FL    MCH 33.2 26.0 - 34.0 PG    MCHC 34.2 30.0 - 36.5 g/dL    RDW 16.6 (H) 11.5 - 14.5 %    PLATELET 851 981 - 812 K/uL    MPV 12.3 8.9 - 12.9 FL    NRBC 3.0 (H) 0  WBC    ABSOLUTE NRBC 0.23 (H) 0.00 - 0.01 K/uL    NEUTROPHILS 38 32 - 75 %    BAND NEUTROPHILS 0 0 - 6 %    LYMPHOCYTES 51 (H) 12 - 49 %    MONOCYTES 7 5 - 13 %    EOSINOPHILS 4 0 - 7 %    BASOPHILS 0 0 - 1 %    METAMYELOCYTES 0 0 %    MYELOCYTES 0 0 %    PROMYELOCYTES 0 0 %    BLASTS 0 0 %    OTHER CELL 0 0      IMMATURE GRANULOCYTES 0 %    ABS. NEUTROPHILS 3.0 1.8 - 8.0 K/UL    ABS. LYMPHOCYTES 4.0 (H) 0.8 - 3.5 K/UL    ABS. MONOCYTES 0.5 0.0 - 1.0 K/UL    ABS. EOSINOPHILS 0.3 0.0 - 0.4 K/UL    ABS. BASOPHILS 0.0 0.0 - 0.1 K/UL    ABS. IMM. GRANS. 0.0 K/UL    RBC COMMENTS ANISOCYTOSIS  1+        RBC COMMENTS MACROCYTOSIS  1+        RBC COMMENTS HYPOCHROMIA  1+        RBC COMMENTS TARGET CELLS  PRESENT       GLUCOSE, POC    Collection Time: 07/19/18  7:26 AM   Result Value Ref Range    Glucose (POC) 100 65 - 100 mg/dL    Performed by Daryl Seymour)            Assessment:     Principal Problem:    Intractable nausea and vomiting (7/15/2018)    Active Problems:    Nausea and vomiting (7/15/2018)      Vomiting (7/17/2018)        Plan:     1. Continue treatment of painful crises which is improving. Plan discharge tomorrow if all goes well. 2.  GI aspect stable.

## 2018-07-19 NOTE — PROGRESS NOTES
H order sent to [facility] as pt is an [facility]CO patient and pt was in agreement. 5  [facility] unable to follow as pt has no qualifying ACO diagnoses. MD anticipates d/c tomorrow if stable.

## 2018-07-19 NOTE — PROGRESS NOTES
Initial Nutrition Assessment:    INTERVENTIONS/RECOMMENDATIONS:   · Meals/Snacks: General/healthful diet: Regular diet    ASSESSMENT:   Patient medically noted for nausea/vomiting, pain, and acute gastritis. PMH for sickle cell, depression, and HTN. Currently receiving a regular diet. PO intake 100% of meals per flowsheets. Gastritis has resolved. Possible discharge tomorrow per MD.     Diet Order: Regular  % Eaten:  Patient Vitals for the past 72 hrs:   % Diet Eaten   07/18/18 1628 100 %   07/18/18 0849 100 %   07/17/18 0952 100 %       Pertinent Medications: [x]Reviewed []Other: celexa, folic acid  Pertinent Labs: [x]Reviewed []Other: 6.919.9  Food Allergies: [x]None []Other   Last BM: 7/14   [x]Active     []Hyperactive  []Hypoactive       [] Absent BS  Skin:    [x] Intact   [] Incision  [] Breakdown: [] Edema []Other:    Anthropometrics:   Height: 5' 11\" (180.3 cm) Weight: 86.6 kg (191 lb)   IBW (%IBW):   ( ) UBW (%UBW):   (  %)   Last Weight Metrics:  Weight Loss Metrics 7/15/2018 7/9/2018 6/12/2018 4/9/2018 3/13/2018 1/29/2018 1/5/2018   Today's Wt 191 lb 191 lb 195 lb 8 oz 189 lb 8 oz 187 lb 3.2 oz 192 lb 4.8 oz 190 lb 7.6 oz   BMI 26.64 kg/m2 27.41 kg/m2 28.05 kg/m2 27.19 kg/m2 26.86 kg/m2 27.59 kg/m2 27.33 kg/m2       BMI: Body mass index is 26.64 kg/(m^2). This BMI is indicative of:   []Underweight    []Normal    [x]Overweight    [] Obesity   [] Extreme Obesity (BMI>40)     Estimated Nutrition Needs (Based on):   2029 Kcals/day (BMR (1561) x 1. 3AF) , 70 g (-87g (0.8-1.0 g/kg bw)) Protein  Carbohydrate:  At Least 130 g/day  Fluids: 2000 mL/day (1ml/kcal)    Pt expected to meet estimated nutrient needs: [x]Yes []No    NUTRITION DIAGNOSES:   Problem:  No nutritional diagnosis at this time      Etiology: related to       Signs/Symptoms: as evidenced by        NUTRITION INTERVENTIONS:  Meals/Snacks: General/healthful diet                  GOAL:   PO intake >75% of meals next 5-7 days    LEARNING NEEDS (Diet, Food/Nutrient-Drug Interaction):    [x] None Identified   [] Identified and Education Provided/Documented   [] Identified and Pt declined/was not appropriate     Cultural, Gnosticist, OR Ethnic Dietary Needs:    [x] None Identified   [] Identified and Addressed     [x] Interdisciplinary Care Plan Reviewed/Documented    [x] Discharge Planning: Regular diet      MONITORING /EVALUATION:      Food/Nutrient Intake Outcomes:  Total energy intake  Physical Signs/Symptoms Outcomes: Weight/weight change    NUTRITION RISK:    [] High              [] Moderate           [x]  Low  []  Minimal/Uncompromised    PT SEEN FOR:    []  MD Consult: []Calorie Count      []Diabetic Diet Education        []Diet Education     []Electrolyte Management     []General Nutrition Management and Supplements     []Management of Tube Feeding     []TPN Recommendations    []  RN Referral:  []MST score >=2     []Enteral/Parenteral Nutrition PTA     []Pregnant: Gestational DM or Multigestation     []Pressure Ulcer/Wound Care needs        []  Low BMI  [x]  KIRILL Phelps Notice  Pager [phone]                 Weekend Pager [phone]

## 2018-07-20 LAB
GLUCOSE BLD STRIP.AUTO-MCNC: 106 MG/DL (ref 65–100)
GLUCOSE BLD STRIP.AUTO-MCNC: 109 MG/DL (ref 65–100)
GLUCOSE BLD STRIP.AUTO-MCNC: 111 MG/DL (ref 65–100)
GLUCOSE BLD STRIP.AUTO-MCNC: 125 MG/DL (ref 65–100)
GLUCOSE BLD STRIP.AUTO-MCNC: 143 MG/DL (ref 65–100)
SERVICE CMNT-IMP: ABNORMAL

## 2018-07-20 PROCEDURE — 74011250636 HC RX REV CODE- 250/636: Performed by: HOSPITALIST

## 2018-07-20 PROCEDURE — 74011250636 HC RX REV CODE- 250/636: Performed by: INTERNAL MEDICINE

## 2018-07-20 PROCEDURE — 82962 GLUCOSE BLOOD TEST: CPT

## 2018-07-20 PROCEDURE — 65270000029 HC RM PRIVATE

## 2018-07-20 PROCEDURE — 74011250637 HC RX REV CODE- 250/637: Performed by: HOSPITALIST

## 2018-07-20 RX ORDER — DIPHENHYDRAMINE HYDROCHLORIDE 50 MG/ML
12.5 INJECTION, SOLUTION INTRAMUSCULAR; INTRAVENOUS
Status: DISCONTINUED | OUTPATIENT
Start: 2018-07-20 | End: 2018-07-27 | Stop reason: HOSPADM

## 2018-07-20 RX ORDER — HYDROMORPHONE HYDROCHLORIDE 2 MG/ML
1 INJECTION, SOLUTION INTRAMUSCULAR; INTRAVENOUS; SUBCUTANEOUS
Status: DISCONTINUED | OUTPATIENT
Start: 2018-07-20 | End: 2018-07-26

## 2018-07-20 RX ORDER — DEXTROSE, SODIUM CHLORIDE, AND POTASSIUM CHLORIDE 5; .45; .15 G/100ML; G/100ML; G/100ML
50 INJECTION INTRAVENOUS CONTINUOUS
Status: ACTIVE | OUTPATIENT
Start: 2018-07-20 | End: 2018-07-20

## 2018-07-20 RX ADMIN — HEPARIN SODIUM 5000 UNITS: 5000 INJECTION INTRAVENOUS; SUBCUTANEOUS at 04:55

## 2018-07-20 RX ADMIN — HYDROCODONE BITARTRATE AND ACETAMINOPHEN 1 TABLET: 10; 325 TABLET ORAL at 00:52

## 2018-07-20 RX ADMIN — ONDANSETRON 4 MG: 2 INJECTION INTRAMUSCULAR; INTRAVENOUS at 19:31

## 2018-07-20 RX ADMIN — HYDROCODONE BITARTRATE AND ACETAMINOPHEN 1 TABLET: 10; 325 TABLET ORAL at 17:19

## 2018-07-20 RX ADMIN — DEXTROSE MONOHYDRATE, SODIUM CHLORIDE, AND POTASSIUM CHLORIDE 50 ML/HR: 50; 4.5; 1.49 INJECTION, SOLUTION INTRAVENOUS at 12:32

## 2018-07-20 RX ADMIN — Medication 10 ML: at 19:33

## 2018-07-20 RX ADMIN — HEPARIN SODIUM 5000 UNITS: 5000 INJECTION INTRAVENOUS; SUBCUTANEOUS at 12:33

## 2018-07-20 RX ADMIN — CITALOPRAM HYDROBROMIDE 10 MG: 20 TABLET ORAL at 17:19

## 2018-07-20 RX ADMIN — HYDROCODONE BITARTRATE AND ACETAMINOPHEN 1 TABLET: 10; 325 TABLET ORAL at 21:25

## 2018-07-20 RX ADMIN — Medication 10 ML: at 12:35

## 2018-07-20 RX ADMIN — ONDANSETRON 4 MG: 2 INJECTION INTRAMUSCULAR; INTRAVENOUS at 04:55

## 2018-07-20 RX ADMIN — HYDROCODONE BITARTRATE AND ACETAMINOPHEN 1 TABLET: 10; 325 TABLET ORAL at 13:25

## 2018-07-20 RX ADMIN — ONDANSETRON 4 MG: 2 INJECTION INTRAMUSCULAR; INTRAVENOUS at 13:24

## 2018-07-20 RX ADMIN — HYDROCODONE BITARTRATE AND ACETAMINOPHEN 1 TABLET: 10; 325 TABLET ORAL at 04:56

## 2018-07-20 RX ADMIN — DIPHENHYDRAMINE HYDROCHLORIDE 12.5 MG: 50 INJECTION, SOLUTION INTRAMUSCULAR; INTRAVENOUS at 13:25

## 2018-07-20 RX ADMIN — FOLIC ACID 1 MG: 1 TABLET ORAL at 09:18

## 2018-07-20 RX ADMIN — Medication 10 ML: at 05:00

## 2018-07-20 RX ADMIN — DIPHENHYDRAMINE HYDROCHLORIDE 12.5 MG: 50 INJECTION, SOLUTION INTRAMUSCULAR; INTRAVENOUS at 19:31

## 2018-07-20 RX ADMIN — HYDROMORPHONE HYDROCHLORIDE 1 MG: 2 INJECTION, SOLUTION INTRAMUSCULAR; INTRAVENOUS; SUBCUTANEOUS at 17:19

## 2018-07-20 RX ADMIN — HYDROMORPHONE HYDROCHLORIDE 1 MG: 2 INJECTION, SOLUTION INTRAMUSCULAR; INTRAVENOUS; SUBCUTANEOUS at 09:19

## 2018-07-20 RX ADMIN — DIPHENHYDRAMINE HYDROCHLORIDE 25 MG: 25 SOLUTION ORAL at 04:56

## 2018-07-20 RX ADMIN — Medication 10 ML: at 21:27

## 2018-07-20 RX ADMIN — DIPHENHYDRAMINE HYDROCHLORIDE 12.5 MG: 50 INJECTION, SOLUTION INTRAMUSCULAR; INTRAVENOUS at 09:20

## 2018-07-20 NOTE — PROGRESS NOTES
General Daily Progress Note    Admit Date: 7/15/2018    Subjective:     Patient continues to complain of pain. Current Facility-Administered Medications   Medication Dose Route Frequency    HYDROmorphone (PF) (DILAUDID) injection 1 mg  1 mg IntraVENous Q8H PRN    diphenhydrAMINE (BENADRYL) injection 12.5 mg  12.5 mg IntraVENous Q4H PRN    fentaNYL (DURAGESIC) 75 mcg/hr patch 1 Patch  1 Patch TransDERmal Q72H    dextrose 5% - 0.45% NaCl with KCl 20 mEq/L infusion  50 mL/hr IntraVENous CONTINUOUS    citalopram (CELEXA) tablet 10 mg  10 mg Oral QPM    folic acid (FOLVITE) tablet 1 mg  1 mg Oral DAILY    sodium chloride (NS) flush 5-10 mL  5-10 mL IntraVENous Q8H    sodium chloride (NS) flush 5-10 mL  5-10 mL IntraVENous PRN    heparin (porcine) injection 5,000 Units  5,000 Units SubCUTAneous Q8H    ondansetron (ZOFRAN) injection 4 mg  4 mg IntraVENous Q6H PRN    promethazine (PHENERGAN) 12.5 mg in 0.9% sodium chloride 50 mL IVPB  12.5 mg IntraVENous Q4H PRN    HYDROcodone-acetaminophen (NORCO)  mg tablet 1 Tab  1 Tab Oral Q4H PRN        Review of Systems  A comprehensive review of systems was negative.     Objective:     Patient Vitals for the past 24 hrs:   BP Temp Pulse Resp SpO2   07/19/18 2236 126/83 98.5 °F (36.9 °C) 84 18 99 %   07/19/18 1621 138/72 98.5 °F (36.9 °C) 82 14 98 %             Physical Exam:   Visit Vitals    /83    Pulse 84    Temp 98.5 °F (36.9 °C)    Resp 18    Ht 5' 11\" (1.803 m)    Wt 191 lb (86.6 kg)    SpO2 99%    BMI 26.64 kg/m2     General appearance: alert, cooperative, no distress, appears stated age  Neck: supple, symmetrical, trachea midline, no adenopathy, thyroid: not enlarged, symmetric, no tenderness/mass/nodules, no carotid bruit and no JVD  Lungs: clear to auscultation bilaterally  Heart: regular rate and rhythm, S1, S2 normal, no murmur, click, rub or gallop  Extremities: extremities normal, atraumatic, no cyanosis or edema        Data Review Recent Results (from the past 24 hour(s))   GLUCOSE, POC    Collection Time: 07/19/18 12:18 PM   Result Value Ref Range    Glucose (POC) 113 (H) 65 - 100 mg/dL    Performed by Oumou Al)    GLUCOSE, POC    Collection Time: 07/19/18  8:49 PM   Result Value Ref Range    Glucose (POC) 103 (H) 65 - 100 mg/dL    Performed by Za Christian    GLUCOSE, POC    Collection Time: 07/20/18  7:57 AM   Result Value Ref Range    Glucose (POC) 125 (H) 65 - 100 mg/dL    Performed by Karli Arango            Assessment:     Principal Problem:    Intractable nausea and vomiting (7/15/2018)    Active Problems:    Nausea and vomiting (7/15/2018)      Vomiting (7/17/2018)        Plan:     1. Patient continues with pain and will therefore maintain analgesics as prescribed. Improvement is low but progressive.

## 2018-07-20 NOTE — PROGRESS NOTES
Problem: Falls - Risk of  Goal: *Absence of Falls  Document Mukund Fall Risk and appropriate interventions in the flowsheet.    Outcome: Progressing Towards Goal  Fall Risk Interventions:            Medication Interventions: Patient to call before getting OOB, Teach patient to arise slowly         History of Falls Interventions: Door open when patient unattended

## 2018-07-20 NOTE — PROGRESS NOTES
Bedside and Verbal shift change report given to Jean Rendon (oncoming nurse) by Kameron Clark (offgoing nurse). Report included the following information SBAR, Kardex, MAR and Recent Results.

## 2018-07-20 NOTE — PROGRESS NOTES
Bedside and Verbal shift change report given to Elizabeth Finch 88 (oncoming nurse) by El Perez RN (offgoing nurse). Report included the following information SBAR, Kardex, Intake/Output, MAR and Recent Results.

## 2018-07-20 NOTE — PROGRESS NOTES
Bedside and Verbal shift change report given to Mino Coley (oncoming nurse) by Lulla Lennox (offgoing nurse). Report included the following information Kardex and MAR.

## 2018-07-21 ENCOUNTER — APPOINTMENT (OUTPATIENT)
Dept: GENERAL RADIOLOGY | Age: 58
DRG: 811 | End: 2018-07-21
Attending: INTERNAL MEDICINE
Payer: MEDICARE

## 2018-07-21 LAB
GLUCOSE BLD STRIP.AUTO-MCNC: 107 MG/DL (ref 65–100)
GLUCOSE BLD STRIP.AUTO-MCNC: 113 MG/DL (ref 65–100)
GLUCOSE BLD STRIP.AUTO-MCNC: 130 MG/DL (ref 65–100)
GLUCOSE BLD STRIP.AUTO-MCNC: 95 MG/DL (ref 65–100)
SERVICE CMNT-IMP: ABNORMAL
SERVICE CMNT-IMP: NORMAL

## 2018-07-21 PROCEDURE — 71045 X-RAY EXAM CHEST 1 VIEW: CPT

## 2018-07-21 PROCEDURE — 74011250636 HC RX REV CODE- 250/636: Performed by: HOSPITALIST

## 2018-07-21 PROCEDURE — 65270000029 HC RM PRIVATE

## 2018-07-21 PROCEDURE — 74011250637 HC RX REV CODE- 250/637: Performed by: HOSPITALIST

## 2018-07-21 PROCEDURE — 74011250636 HC RX REV CODE- 250/636: Performed by: INTERNAL MEDICINE

## 2018-07-21 PROCEDURE — 82962 GLUCOSE BLOOD TEST: CPT

## 2018-07-21 PROCEDURE — 74011000258 HC RX REV CODE- 258: Performed by: INTERNAL MEDICINE

## 2018-07-21 PROCEDURE — 74011250637 HC RX REV CODE- 250/637: Performed by: INTERNAL MEDICINE

## 2018-07-21 RX ORDER — DEXTROSE, SODIUM CHLORIDE, AND POTASSIUM CHLORIDE 5; .45; .15 G/100ML; G/100ML; G/100ML
75 INJECTION INTRAVENOUS CONTINUOUS
Status: DISCONTINUED | OUTPATIENT
Start: 2018-07-21 | End: 2018-07-26

## 2018-07-21 RX ORDER — VANCOMYCIN 1.75 GRAM/500 ML IN 0.9 % SODIUM CHLORIDE INTRAVENOUS
1750 ONCE
Status: COMPLETED | OUTPATIENT
Start: 2018-07-21 | End: 2018-07-21

## 2018-07-21 RX ORDER — VANCOMYCIN HYDROCHLORIDE
1250 EVERY 12 HOURS
Status: DISCONTINUED | OUTPATIENT
Start: 2018-07-22 | End: 2018-07-23

## 2018-07-21 RX ORDER — ACETAMINOPHEN 325 MG/1
650 TABLET ORAL
Status: DISCONTINUED | OUTPATIENT
Start: 2018-07-21 | End: 2018-07-27 | Stop reason: HOSPADM

## 2018-07-21 RX ADMIN — DEXTROSE MONOHYDRATE, SODIUM CHLORIDE, AND POTASSIUM CHLORIDE 75 ML/HR: 50; 4.5; 1.49 INJECTION, SOLUTION INTRAVENOUS at 13:31

## 2018-07-21 RX ADMIN — HYDROCODONE BITARTRATE AND ACETAMINOPHEN 1 TABLET: 10; 325 TABLET ORAL at 05:26

## 2018-07-21 RX ADMIN — ACETAMINOPHEN 650 MG: 325 TABLET ORAL at 19:49

## 2018-07-21 RX ADMIN — Medication 20 ML: at 14:45

## 2018-07-21 RX ADMIN — HYDROMORPHONE HYDROCHLORIDE 1 MG: 2 INJECTION, SOLUTION INTRAMUSCULAR; INTRAVENOUS; SUBCUTANEOUS at 18:47

## 2018-07-21 RX ADMIN — DIPHENHYDRAMINE HYDROCHLORIDE 12.5 MG: 50 INJECTION, SOLUTION INTRAMUSCULAR; INTRAVENOUS at 05:27

## 2018-07-21 RX ADMIN — DIPHENHYDRAMINE HYDROCHLORIDE 12.5 MG: 50 INJECTION, SOLUTION INTRAMUSCULAR; INTRAVENOUS at 01:18

## 2018-07-21 RX ADMIN — Medication 10 ML: at 22:54

## 2018-07-21 RX ADMIN — ONDANSETRON 4 MG: 2 INJECTION INTRAMUSCULAR; INTRAVENOUS at 01:18

## 2018-07-21 RX ADMIN — HYDROCODONE BITARTRATE AND ACETAMINOPHEN 1 TABLET: 10; 325 TABLET ORAL at 09:56

## 2018-07-21 RX ADMIN — HYDROMORPHONE HYDROCHLORIDE 1 MG: 2 INJECTION, SOLUTION INTRAMUSCULAR; INTRAVENOUS; SUBCUTANEOUS at 01:18

## 2018-07-21 RX ADMIN — FOLIC ACID 1 MG: 1 TABLET ORAL at 09:34

## 2018-07-21 RX ADMIN — Medication 10 ML: at 13:35

## 2018-07-21 RX ADMIN — CEFTRIAXONE 1 G: 1 INJECTION, POWDER, FOR SOLUTION INTRAMUSCULAR; INTRAVENOUS at 19:43

## 2018-07-21 RX ADMIN — VANCOMYCIN HYDROCHLORIDE 1750 MG: 10 INJECTION, POWDER, LYOPHILIZED, FOR SOLUTION INTRAVENOUS at 20:16

## 2018-07-21 RX ADMIN — DIPHENHYDRAMINE HYDROCHLORIDE 12.5 MG: 50 INJECTION, SOLUTION INTRAMUSCULAR; INTRAVENOUS at 18:48

## 2018-07-21 RX ADMIN — CITALOPRAM HYDROBROMIDE 10 MG: 20 TABLET ORAL at 18:49

## 2018-07-21 RX ADMIN — HYDROCODONE BITARTRATE AND ACETAMINOPHEN 1 TABLET: 10; 325 TABLET ORAL at 18:49

## 2018-07-21 RX ADMIN — DIPHENHYDRAMINE HYDROCHLORIDE 12.5 MG: 50 INJECTION, SOLUTION INTRAMUSCULAR; INTRAVENOUS at 22:54

## 2018-07-21 RX ADMIN — HYDROCODONE BITARTRATE AND ACETAMINOPHEN 1 TABLET: 10; 325 TABLET ORAL at 01:18

## 2018-07-21 RX ADMIN — ONDANSETRON 4 MG: 2 INJECTION INTRAMUSCULAR; INTRAVENOUS at 12:00

## 2018-07-21 RX ADMIN — Medication 10 ML: at 05:27

## 2018-07-21 RX ADMIN — HYDROMORPHONE HYDROCHLORIDE 1 MG: 2 INJECTION, SOLUTION INTRAMUSCULAR; INTRAVENOUS; SUBCUTANEOUS at 09:35

## 2018-07-21 RX ADMIN — HYDROCODONE BITARTRATE AND ACETAMINOPHEN 1 TABLET: 10; 325 TABLET ORAL at 22:54

## 2018-07-21 RX ADMIN — DIPHENHYDRAMINE HYDROCHLORIDE 12.5 MG: 50 INJECTION, SOLUTION INTRAMUSCULAR; INTRAVENOUS at 09:34

## 2018-07-21 NOTE — PROGRESS NOTES
1130   Completed report with offgoing RN    1930  Bedside and Verbal shift change report given to oncoming nurse. Report included the following information SBAR, Kardex, MAR and Recent Results.      Late Entry charting completed 07/21/18

## 2018-07-21 NOTE — PROGRESS NOTES
0700 Bedside and Verbal shift change report given to Ady Espitia (oncoming nurse) by Axel Orosco RN (offgoing nurse). Report included the following information SBAR, Kardex, Intake/Output and MAR.

## 2018-07-21 NOTE — PROGRESS NOTES
Pharmacy Automatic Renal Dosing Protocol - Antimicrobials    Indication for Antimicrobials: FUO    Current Regimen of Each Antimicrobial:  Vancomycin 1750 mg x 1 (~20 mg/kg) then 1250 mg Q12H  (Start Date ; Day # 1)  Rocephin 1 gram Q24H (Start Date , Day 1)    Previous Antimicrobial Therapy:    Vancomycin Goal Level: 10-15 mcg/ml    Vancomycin Levels  Date Dose & Interval Measured (mcg/mL) Steady State (mcg/mL)                           Significant Cultures: None    Radiology / Imaging results: (X-ray, CT scan or MRI): CT no acute disease      Labs:  Recent Labs      18   0114   CREA  0.78   BUN  3*   WBC  7.8     Temp (24hrs), Av.2 °F (37.9 °C), Min:98.4 °F (36.9 °C), Max:102.9 °F (39.4 °C)        Paralysis, amputations, malnutrition: No  Creatinine Clearance (mL/min) or Dialysis: 91    Impression/Plan:   Vancomycin 1750 mg x1 then 1250 mg Q12H with anticipated trough of 13.5 mcg/ml. Serum creatinine declining. [facility] daily     Pharmacy will follow daily and adjust medications as appropriate for renal function and/or serum levels. Thank you,  Anika Taylor, [facility]      Recommended duration of therapy  [email or web address]/[email or web address]. docx    Renal Dosing  [email or web address]/[email or web address]/Renal%[email or web address]. pdf

## 2018-07-21 NOTE — PROGRESS NOTES
102.9 temp @1849 was not aware of the 100.3 temp at 1502 until giving evening meds; MEWS 5. Paged and informed Dr. Henry Bolanos of temp 102.9, telephone orders received for UA and culture, chest Xray, paired blood cultures, Rocephin 1 g Q24 hours, Vancomycin to dose, Tylenol 650 mg PO Q4 PRN.

## 2018-07-21 NOTE — PROGRESS NOTES
Attempted to draw blood cultures, unsuccessful x 2, PICC team was needed to replace peripheral IV today; seeking ER resources and nursing supervisor to help draw ordered blood cultures. Bedside and Verbal shift change report given to Skylar Saldivar RN (oncoming nurse) by Nahun Adames RN (offgoing nurse). Report included the following information SBAR, Kardex, Intake/Output, MAR and Recent Results.

## 2018-07-21 NOTE — PROGRESS NOTES
General Daily Progress Note    Admit Date: 7/15/2018    Subjective:     Patient continues to complain of pain. Current Facility-Administered Medications   Medication Dose Route Frequency    HYDROmorphone (PF) (DILAUDID) injection 1 mg  1 mg IntraVENous Q8H PRN    diphenhydrAMINE (BENADRYL) injection 12.5 mg  12.5 mg IntraVENous Q4H PRN    fentaNYL (DURAGESIC) 75 mcg/hr patch 1 Patch  1 Patch TransDERmal Q72H    citalopram (CELEXA) tablet 10 mg  10 mg Oral QPM    folic acid (FOLVITE) tablet 1 mg  1 mg Oral DAILY    sodium chloride (NS) flush 5-10 mL  5-10 mL IntraVENous Q8H    sodium chloride (NS) flush 5-10 mL  5-10 mL IntraVENous PRN    heparin (porcine) injection 5,000 Units  5,000 Units SubCUTAneous Q8H    ondansetron (ZOFRAN) injection 4 mg  4 mg IntraVENous Q6H PRN    promethazine (PHENERGAN) 12.5 mg in 0.9% sodium chloride 50 mL IVPB  12.5 mg IntraVENous Q4H PRN    HYDROcodone-acetaminophen (NORCO)  mg tablet 1 Tab  1 Tab Oral Q4H PRN        Review of Systems  A comprehensive review of systems was negative. Objective:     Patient Vitals for the past 24 hrs:   BP Temp Pulse Resp SpO2   07/21/18 0718 106/50 98.4 °F (36.9 °C) 74 20 98 %   07/20/18 2317 109/64 99.2 °F (37.3 °C) 72 18 99 %   07/20/18 1545 130/76 98.4 °F (36.9 °C) 83 18 100 %   07/20/18 1247 125/77 98.9 °F (37.2 °C) 83 18 100 %        07/19 1901 - 07/21 0700  In: 143.3 [P.O.:120;  I.V.:23.3]  Out: -     Physical Exam:   Visit Vitals    /50    Pulse 74    Temp 98.4 °F (36.9 °C)    Resp 20    Ht 5' 11\" (1.803 m)    Wt 191 lb (86.6 kg)    SpO2 98%    BMI 26.64 kg/m2     General appearance: alert, cooperative, no distress, appears stated age  Neck: supple, symmetrical, trachea midline, no adenopathy, thyroid: not enlarged, symmetric, no tenderness/mass/nodules, no carotid bruit and no JVD  Lungs: clear to auscultation bilaterally  Heart: regular rate and rhythm, S1, S2 normal, no murmur, click, rub or gallop  Abdomen: soft, non-tender. Bowel sounds normal. No masses,  no organomegaly  Extremities: extremities normal, atraumatic, no cyanosis or edema        Data Review   Recent Results (from the past 24 hour(s))   GLUCOSE, POC    Collection Time: 07/20/18  4:28 PM   Result Value Ref Range    Glucose (POC) 109 (H) 65 - 100 mg/dL    Performed by Leigh Ann Crump (PCT)    GLUCOSE, POC    Collection Time: 07/20/18  9:20 PM   Result Value Ref Range    Glucose (POC) 111 (H) 65 - 100 mg/dL    Performed by Abigail Quarles (PCT)    GLUCOSE, POC    Collection Time: 07/21/18  7:56 AM   Result Value Ref Range    Glucose (POC) 95 65 - 100 mg/dL    Performed by Jermaine Olivia (PCT)    GLUCOSE, POC    Collection Time: 07/21/18 11:22 AM   Result Value Ref Range    Glucose (POC) 107 (H) 65 - 100 mg/dL    Performed by Jermaine Olivia (PCT)            Assessment:     Principal Problem:    Intractable nausea and vomiting (7/15/2018)    Active Problems:    Nausea and vomiting (7/15/2018)      Vomiting (7/17/2018)        Plan:     1. Continued crisis therefore will continue current treatment regimen. 2.  I encouraged patient to remain physically active.

## 2018-07-22 LAB
ANION GAP SERPL CALC-SCNC: 6 MMOL/L (ref 5–15)
BASOPHILS # BLD: 0.1 K/UL (ref 0–0.1)
BASOPHILS NFR BLD: 1 % (ref 0–1)
BLASTS NFR BLD MANUAL: 0 %
BUN SERPL-MCNC: 7 MG/DL (ref 6–20)
BUN/CREAT SERPL: 9 (ref 12–20)
CALCIUM SERPL-MCNC: 8.5 MG/DL (ref 8.5–10.1)
CHLORIDE SERPL-SCNC: 106 MMOL/L (ref 97–108)
CO2 SERPL-SCNC: 25 MMOL/L (ref 21–32)
CREAT SERPL-MCNC: 0.78 MG/DL (ref 0.55–1.02)
DIFFERENTIAL METHOD BLD: ABNORMAL
EOSINOPHIL # BLD: 0.5 K/UL (ref 0–0.4)
EOSINOPHIL NFR BLD: 6 % (ref 0–7)
ERYTHROCYTE [DISTWIDTH] IN BLOOD BY AUTOMATED COUNT: 15.9 % (ref 11.5–14.5)
GLUCOSE BLD STRIP.AUTO-MCNC: 105 MG/DL (ref 65–100)
GLUCOSE BLD STRIP.AUTO-MCNC: 105 MG/DL (ref 65–100)
GLUCOSE BLD STRIP.AUTO-MCNC: 97 MG/DL (ref 65–100)
GLUCOSE BLD STRIP.AUTO-MCNC: 98 MG/DL (ref 65–100)
GLUCOSE SERPL-MCNC: 84 MG/DL (ref 65–100)
HCT VFR BLD AUTO: 20.7 % (ref 35–47)
HGB BLD-MCNC: 7.2 G/DL (ref 11.5–16)
IMM GRANULOCYTES # BLD: 0 K/UL
IMM GRANULOCYTES NFR BLD AUTO: 0 %
LACTATE SERPL-SCNC: 0.6 MMOL/L (ref 0.4–2)
LYMPHOCYTES # BLD: 2.4 K/UL (ref 0.8–3.5)
LYMPHOCYTES NFR BLD: 30 % (ref 12–49)
MCH RBC QN AUTO: 32.9 PG (ref 26–34)
MCHC RBC AUTO-ENTMCNC: 34.8 G/DL (ref 30–36.5)
MCV RBC AUTO: 94.5 FL (ref 80–99)
METAMYELOCYTES NFR BLD MANUAL: 0 %
MONOCYTES # BLD: 1.1 K/UL (ref 0–1)
MONOCYTES NFR BLD: 14 % (ref 5–13)
MYELOCYTES NFR BLD MANUAL: 0 %
NEUTS BAND NFR BLD MANUAL: 1 % (ref 0–6)
NEUTS SEG # BLD: 4 K/UL (ref 1.8–8)
NEUTS SEG NFR BLD: 48 % (ref 32–75)
NRBC # BLD: 0 K/UL (ref 0–0.01)
NRBC BLD-RTO: 0 PER 100 WBC
OTHER CELLS NFR BLD MANUAL: 0 %
PLATELET # BLD AUTO: 189 K/UL (ref 150–400)
PMV BLD AUTO: 12.1 FL (ref 8.9–12.9)
POTASSIUM SERPL-SCNC: 4.2 MMOL/L (ref 3.5–5.1)
PROMYELOCYTES NFR BLD MANUAL: 0 %
RBC # BLD AUTO: 2.19 M/UL (ref 3.8–5.2)
RBC MORPH BLD: ABNORMAL
SERVICE CMNT-IMP: ABNORMAL
SERVICE CMNT-IMP: ABNORMAL
SERVICE CMNT-IMP: NORMAL
SERVICE CMNT-IMP: NORMAL
SODIUM SERPL-SCNC: 137 MMOL/L (ref 136–145)
WBC # BLD AUTO: 8.1 K/UL (ref 3.6–11)

## 2018-07-22 PROCEDURE — 65270000029 HC RM PRIVATE

## 2018-07-22 PROCEDURE — 82962 GLUCOSE BLOOD TEST: CPT

## 2018-07-22 PROCEDURE — 74011250637 HC RX REV CODE- 250/637: Performed by: HOSPITALIST

## 2018-07-22 PROCEDURE — 36600 WITHDRAWAL OF ARTERIAL BLOOD: CPT

## 2018-07-22 PROCEDURE — 36415 COLL VENOUS BLD VENIPUNCTURE: CPT | Performed by: INTERNAL MEDICINE

## 2018-07-22 PROCEDURE — 74011250636 HC RX REV CODE- 250/636: Performed by: HOSPITALIST

## 2018-07-22 PROCEDURE — 74011250637 HC RX REV CODE- 250/637: Performed by: INTERNAL MEDICINE

## 2018-07-22 PROCEDURE — 87040 BLOOD CULTURE FOR BACTERIA: CPT | Performed by: INTERNAL MEDICINE

## 2018-07-22 PROCEDURE — 80048 BASIC METABOLIC PNL TOTAL CA: CPT | Performed by: INTERNAL MEDICINE

## 2018-07-22 PROCEDURE — 74011000258 HC RX REV CODE- 258: Performed by: INTERNAL MEDICINE

## 2018-07-22 PROCEDURE — 74011250636 HC RX REV CODE- 250/636: Performed by: INTERNAL MEDICINE

## 2018-07-22 PROCEDURE — 83605 ASSAY OF LACTIC ACID: CPT | Performed by: INTERNAL MEDICINE

## 2018-07-22 PROCEDURE — 85027 COMPLETE CBC AUTOMATED: CPT | Performed by: INTERNAL MEDICINE

## 2018-07-22 RX ADMIN — DIPHENHYDRAMINE HYDROCHLORIDE 12.5 MG: 50 INJECTION, SOLUTION INTRAMUSCULAR; INTRAVENOUS at 17:28

## 2018-07-22 RX ADMIN — Medication 10 ML: at 17:29

## 2018-07-22 RX ADMIN — DIPHENHYDRAMINE HYDROCHLORIDE 12.5 MG: 50 INJECTION, SOLUTION INTRAMUSCULAR; INTRAVENOUS at 08:00

## 2018-07-22 RX ADMIN — HYDROCODONE BITARTRATE AND ACETAMINOPHEN 1 TABLET: 10; 325 TABLET ORAL at 12:20

## 2018-07-22 RX ADMIN — CITALOPRAM HYDROBROMIDE 10 MG: 20 TABLET ORAL at 17:28

## 2018-07-22 RX ADMIN — DIPHENHYDRAMINE HYDROCHLORIDE 12.5 MG: 50 INJECTION, SOLUTION INTRAMUSCULAR; INTRAVENOUS at 03:07

## 2018-07-22 RX ADMIN — DIPHENHYDRAMINE HYDROCHLORIDE 12.5 MG: 50 INJECTION, SOLUTION INTRAMUSCULAR; INTRAVENOUS at 23:13

## 2018-07-22 RX ADMIN — DEXTROSE MONOHYDRATE, SODIUM CHLORIDE, AND POTASSIUM CHLORIDE 75 ML/HR: 50; 4.5; 1.49 INJECTION, SOLUTION INTRAVENOUS at 07:07

## 2018-07-22 RX ADMIN — VANCOMYCIN HYDROCHLORIDE 1250 MG: 10 INJECTION, POWDER, LYOPHILIZED, FOR SOLUTION INTRAVENOUS at 23:53

## 2018-07-22 RX ADMIN — HYDROMORPHONE HYDROCHLORIDE 1 MG: 2 INJECTION, SOLUTION INTRAMUSCULAR; INTRAVENOUS; SUBCUTANEOUS at 23:13

## 2018-07-22 RX ADMIN — DIPHENHYDRAMINE HYDROCHLORIDE 12.5 MG: 50 INJECTION, SOLUTION INTRAMUSCULAR; INTRAVENOUS at 12:19

## 2018-07-22 RX ADMIN — Medication 10 ML: at 23:16

## 2018-07-22 RX ADMIN — HEPARIN SODIUM 5000 UNITS: 5000 INJECTION INTRAVENOUS; SUBCUTANEOUS at 12:20

## 2018-07-22 RX ADMIN — HYDROMORPHONE HYDROCHLORIDE 1 MG: 2 INJECTION, SOLUTION INTRAMUSCULAR; INTRAVENOUS; SUBCUTANEOUS at 03:07

## 2018-07-22 RX ADMIN — FOLIC ACID 1 MG: 1 TABLET ORAL at 08:04

## 2018-07-22 RX ADMIN — ACETAMINOPHEN 650 MG: 325 TABLET ORAL at 19:09

## 2018-07-22 RX ADMIN — HYDROCODONE BITARTRATE AND ACETAMINOPHEN 1 TABLET: 10; 325 TABLET ORAL at 08:00

## 2018-07-22 RX ADMIN — CEFTRIAXONE 1 G: 1 INJECTION, POWDER, FOR SOLUTION INTRAMUSCULAR; INTRAVENOUS at 20:29

## 2018-07-22 RX ADMIN — ONDANSETRON 4 MG: 2 INJECTION INTRAMUSCULAR; INTRAVENOUS at 23:12

## 2018-07-22 RX ADMIN — HYDROCODONE BITARTRATE AND ACETAMINOPHEN 1 TABLET: 10; 325 TABLET ORAL at 17:28

## 2018-07-22 RX ADMIN — HYDROCODONE BITARTRATE AND ACETAMINOPHEN 1 TABLET: 10; 325 TABLET ORAL at 23:12

## 2018-07-22 RX ADMIN — ONDANSETRON 4 MG: 2 INJECTION INTRAMUSCULAR; INTRAVENOUS at 12:19

## 2018-07-22 RX ADMIN — VANCOMYCIN HYDROCHLORIDE 1250 MG: 10 INJECTION, POWDER, LYOPHILIZED, FOR SOLUTION INTRAVENOUS at 08:04

## 2018-07-22 RX ADMIN — ONDANSETRON 4 MG: 2 INJECTION INTRAMUSCULAR; INTRAVENOUS at 03:07

## 2018-07-22 RX ADMIN — HYDROMORPHONE HYDROCHLORIDE 1 MG: 2 INJECTION, SOLUTION INTRAMUSCULAR; INTRAVENOUS; SUBCUTANEOUS at 12:19

## 2018-07-22 NOTE — PROGRESS NOTES
Bedside and Verbal shift change report given to Bhumi RN (oncoming nurse) by Stella Sheldon RN (offgoing nurse). Report included the following information SBAR, Kardex, Intake/Output and MAR.

## 2018-07-22 NOTE — PROGRESS NOTES
[facility] order [facility] last week as pt is an ACO patient and pt was in agreement. [facility] unable to follow as pt has no qualifying ACO diagnoses.

## 2018-07-22 NOTE — PROGRESS NOTES
Bedside shift change report given to Camilla Pritchard RN (oncoming nurse) by Edmar Torres RN (offgoing nurse). Report included the following information SBAR, Kardex, Accordion, Recent Results and Med Rec Status.

## 2018-07-22 NOTE — PROGRESS NOTES
Problem: Falls - Risk of  Goal: *Absence of Falls  Document Mukund Fall Risk and appropriate interventions in the flowsheet.    Outcome: Progressing Towards Goal  Fall Risk Interventions:            Medication Interventions: Teach patient to arise slowly         History of Falls Interventions: Door open when patient unattended

## 2018-07-22 NOTE — PROGRESS NOTES
General Daily Progress Note    Admit Date: 7/15/2018    Subjective:     Patient continues to complain of pain. Current Facility-Administered Medications   Medication Dose Route Frequency    [START ON 7/23/2018] VANCOMYCIN TROUGH  1 Each Other ONCE    dextrose 5% - 0.45% NaCl with KCl 20 mEq/L infusion  75 mL/hr IntraVENous CONTINUOUS    cefTRIAXone (ROCEPHIN) 1 g in 0.9% sodium chloride (MBP/ADV) 50 mL  1 g IntraVENous Q24H    vancomycin (VANCOCIN) 1250 mg in  ml infusion  1,250 mg IntraVENous Q12H    acetaminophen (TYLENOL) tablet 650 mg  650 mg Oral Q4H PRN    HYDROmorphone (PF) (DILAUDID) injection 1 mg  1 mg IntraVENous Q8H PRN    diphenhydrAMINE (BENADRYL) injection 12.5 mg  12.5 mg IntraVENous Q4H PRN    fentaNYL (DURAGESIC) 75 mcg/hr patch 1 Patch  1 Patch TransDERmal Q72H    citalopram (CELEXA) tablet 10 mg  10 mg Oral QPM    folic acid (FOLVITE) tablet 1 mg  1 mg Oral DAILY    sodium chloride (NS) flush 5-10 mL  5-10 mL IntraVENous Q8H    sodium chloride (NS) flush 5-10 mL  5-10 mL IntraVENous PRN    heparin (porcine) injection 5,000 Units  5,000 Units SubCUTAneous Q8H    ondansetron (ZOFRAN) injection 4 mg  4 mg IntraVENous Q6H PRN    promethazine (PHENERGAN) 12.5 mg in 0.9% sodium chloride 50 mL IVPB  12.5 mg IntraVENous Q4H PRN    HYDROcodone-acetaminophen (NORCO)  mg tablet 1 Tab  1 Tab Oral Q4H PRN        Review of Systems  A comprehensive review of systems was negative.     Objective:     Patient Vitals for the past 24 hrs:   BP Temp Pulse Resp SpO2 Weight   07/22/18 1431 139/72 98.1 °F (36.7 °C) 100 16 99 % -   07/22/18 0713 135/69 96.7 °F (35.9 °C) 91 18 97 % -   07/22/18 0624 - - - - - 188 lb 15 oz (85.7 kg)   07/21/18 2354 104/60 99.8 °F (37.7 °C) 92 19 97 % -   07/21/18 2120 - (!) 101.4 °F (38.6 °C) - - - -   07/21/18 1849 153/85 (!) 102.9 °F (39.4 °C) (!) 111 20 100 % -     07/22 0701 - 07/22 1900  In: 640 [P.O.:640]  Out: -   07/20 1901 - 07/22 0700  In: 8568 [I.V.:1570]  Out: -     Physical Exam:   Visit Vitals    /72    Pulse 100    Temp 98.1 °F (36.7 °C)    Resp 16    Ht 5' 11\" (1.803 m)    Wt 188 lb 15 oz (85.7 kg)    SpO2 99%    BMI 26.35 kg/m2     General appearance: alert, cooperative, no distress, appears stated age  Neck: supple, symmetrical, trachea midline, no adenopathy, thyroid: not enlarged, symmetric, no tenderness/mass/nodules, no carotid bruit and no JVD  Lungs: clear to auscultation bilaterally  Heart: regular rate and rhythm, S1, S2 normal, no murmur, click, rub or gallop  Abdomen: soft, non-tender. Bowel sounds normal. No masses,  no organomegaly  Extremities: extremities normal, atraumatic, no cyanosis or edema        Data Review   Recent Results (from the past 24 hour(s))   GLUCOSE, POC    Collection Time: 07/21/18  9:22 PM   Result Value Ref Range    Glucose (POC) 130 (H) 65 - 100 mg/dL    Performed by Thaddeus Quiñones    CBC WITH MANUAL DIFF    Collection Time: 07/22/18  3:53 AM   Result Value Ref Range    WBC 8.1 3.6 - 11.0 K/uL    RBC 2.19 (L) 3.80 - 5.20 M/uL    HGB 7.2 (L) 11.5 - 16.0 g/dL    HCT 20.7 (L) 35.0 - 47.0 %    MCV 94.5 80.0 - 99.0 FL    MCH 32.9 26.0 - 34.0 PG    MCHC 34.8 30.0 - 36.5 g/dL    RDW 15.9 (H) 11.5 - 14.5 %    PLATELET 405 768 - 848 K/uL    MPV 12.1 8.9 - 12.9 FL    NRBC 0.0 0  WBC    ABSOLUTE NRBC 0.00 0.00 - 0.01 K/uL    NEUTROPHILS 48 32 - 75 %    BAND NEUTROPHILS 1 0 - 6 %    LYMPHOCYTES 30 12 - 49 %    MONOCYTES 14 (H) 5 - 13 %    EOSINOPHILS 6 0 - 7 %    BASOPHILS 1 0 - 1 %    METAMYELOCYTES 0 0 %    MYELOCYTES 0 0 %    PROMYELOCYTES 0 0 %    BLASTS 0 0 %    OTHER CELL 0 0      IMMATURE GRANULOCYTES 0 %    ABS. NEUTROPHILS 4.0 1.8 - 8.0 K/UL    ABS. LYMPHOCYTES 2.4 0.8 - 3.5 K/UL    ABS. MONOCYTES 1.1 (H) 0.0 - 1.0 K/UL    ABS. EOSINOPHILS 0.5 (H) 0.0 - 0.4 K/UL    ABS. BASOPHILS 0.1 0.0 - 0.1 K/UL    ABS. IMM.  GRANS. 0.0 K/UL    DF MANUAL      RBC COMMENTS ANISOCYTOSIS  1+        RBC COMMENTS OVALOCYTES  1+        RBC COMMENTS TARGET CELLS  1+       METABOLIC PANEL, BASIC    Collection Time: 07/22/18  3:53 AM   Result Value Ref Range    Sodium 137 136 - 145 mmol/L    Potassium 4.2 3.5 - 5.1 mmol/L    Chloride 106 97 - 108 mmol/L    CO2 25 21 - 32 mmol/L    Anion gap 6 5 - 15 mmol/L    Glucose 84 65 - 100 mg/dL    BUN 7 6 - 20 MG/DL    Creatinine 0.78 0.55 - 1.02 MG/DL    BUN/Creatinine ratio 9 (L) 12 - 20      GFR est AA >60 >60 ml/min/1.73m2    GFR est non-AA >60 >60 ml/min/1.73m2    Calcium 8.5 8.5 - 10.1 MG/DL   LACTIC ACID    Collection Time: 07/22/18  3:53 AM   Result Value Ref Range    Lactic acid 0.6 0.4 - 2.0 MMOL/L   CULTURE, BLOOD, PAIRED    Collection Time: 07/22/18  3:53 AM   Result Value Ref Range    Special Requests: NO SPECIAL REQUESTS      Culture result: NO GROWTH AFTER 3 HOURS     GLUCOSE, POC    Collection Time: 07/22/18  7:19 AM   Result Value Ref Range    Glucose (POC) 98 65 - 100 mg/dL    Performed by Andrea Mortensen (PCT)    GLUCOSE, POC    Collection Time: 07/22/18 12:03 PM   Result Value Ref Range    Glucose (POC) 105 (H) 65 - 100 mg/dL    Performed by Andrea Rotsommer (PCT)    GLUCOSE, POC    Collection Time: 07/22/18  4:39 PM   Result Value Ref Range    Glucose (POC) 105 (H) 65 - 100 mg/dL    Performed by Andrea Rotsommer (PCT)            Assessment:     Principal Problem:    Intractable nausea and vomiting (7/15/2018)    Active Problems:    Nausea and vomiting (7/15/2018)      Vomiting (7/17/2018)        Plan:     1. Patient has developed a hospital-acquired pneumonia left lower lobe. Continue parenteral antibiotics. 2.  Continue treatment for painful crises.

## 2018-07-22 NOTE — PROGRESS NOTES
Spoke to  about administering HYDROmorphone and Norco at the same time according to patient request.

## 2018-07-23 LAB
ANION GAP SERPL CALC-SCNC: 7 MMOL/L (ref 5–15)
BASOPHILS # BLD: 0.1 K/UL (ref 0–0.1)
BASOPHILS NFR BLD: 1 % (ref 0–1)
BLASTS NFR BLD MANUAL: 0 %
BUN SERPL-MCNC: 7 MG/DL (ref 6–20)
BUN/CREAT SERPL: 9 (ref 12–20)
CALCIUM SERPL-MCNC: 8.8 MG/DL (ref 8.5–10.1)
CHLORIDE SERPL-SCNC: 106 MMOL/L (ref 97–108)
CO2 SERPL-SCNC: 25 MMOL/L (ref 21–32)
CREAT SERPL-MCNC: 0.79 MG/DL (ref 0.55–1.02)
DATE LAST DOSE: ABNORMAL
DIFFERENTIAL METHOD BLD: ABNORMAL
EOSINOPHIL # BLD: 0.1 K/UL (ref 0–0.4)
EOSINOPHIL NFR BLD: 1 % (ref 0–7)
ERYTHROCYTE [DISTWIDTH] IN BLOOD BY AUTOMATED COUNT: 16 % (ref 11.5–14.5)
GLUCOSE BLD STRIP.AUTO-MCNC: 103 MG/DL (ref 65–100)
GLUCOSE BLD STRIP.AUTO-MCNC: 112 MG/DL (ref 65–100)
GLUCOSE BLD STRIP.AUTO-MCNC: 136 MG/DL (ref 65–100)
GLUCOSE BLD STRIP.AUTO-MCNC: 95 MG/DL (ref 65–100)
GLUCOSE SERPL-MCNC: 85 MG/DL (ref 65–100)
HCT VFR BLD AUTO: 19.6 % (ref 35–47)
HGB BLD-MCNC: 6.9 G/DL (ref 11.5–16)
IMM GRANULOCYTES # BLD: 0 K/UL
IMM GRANULOCYTES NFR BLD AUTO: 0 %
LYMPHOCYTES # BLD: 3.2 K/UL (ref 0.8–3.5)
LYMPHOCYTES NFR BLD: 43 % (ref 12–49)
MCH RBC QN AUTO: 33.2 PG (ref 26–34)
MCHC RBC AUTO-ENTMCNC: 35.2 G/DL (ref 30–36.5)
MCV RBC AUTO: 94.2 FL (ref 80–99)
METAMYELOCYTES NFR BLD MANUAL: 0 %
MONOCYTES # BLD: 0.8 K/UL (ref 0–1)
MONOCYTES NFR BLD: 10 % (ref 5–13)
MYELOCYTES NFR BLD MANUAL: 0 %
NEUTS BAND NFR BLD MANUAL: 0 % (ref 0–6)
NEUTS SEG # BLD: 3.3 K/UL (ref 1.8–8)
NEUTS SEG NFR BLD: 45 % (ref 32–75)
NRBC # BLD: 0.02 K/UL (ref 0–0.01)
NRBC BLD-RTO: 0.3 PER 100 WBC
OTHER CELLS NFR BLD MANUAL: 0 %
PLATELET # BLD AUTO: 183 K/UL (ref 150–400)
PMV BLD AUTO: 12 FL (ref 8.9–12.9)
POTASSIUM SERPL-SCNC: 4.4 MMOL/L (ref 3.5–5.1)
PROMYELOCYTES NFR BLD MANUAL: 0 %
RBC # BLD AUTO: 2.08 M/UL (ref 3.8–5.2)
RBC MORPH BLD: ABNORMAL
RBC MORPH BLD: ABNORMAL
REPORTED DOSE,DOSE: ABNORMAL UNITS
REPORTED DOSE/TIME,TMG: ABNORMAL
SERVICE CMNT-IMP: ABNORMAL
SERVICE CMNT-IMP: NORMAL
SODIUM SERPL-SCNC: 138 MMOL/L (ref 136–145)
VANCOMYCIN TROUGH SERPL-MCNC: 14.6 UG/ML (ref 5–10)
WBC # BLD AUTO: 7.5 K/UL (ref 3.6–11)

## 2018-07-23 PROCEDURE — 80202 ASSAY OF VANCOMYCIN: CPT | Performed by: INTERNAL MEDICINE

## 2018-07-23 PROCEDURE — 65270000029 HC RM PRIVATE

## 2018-07-23 PROCEDURE — 36415 COLL VENOUS BLD VENIPUNCTURE: CPT | Performed by: INTERNAL MEDICINE

## 2018-07-23 PROCEDURE — 36600 WITHDRAWAL OF ARTERIAL BLOOD: CPT

## 2018-07-23 PROCEDURE — 74011250636 HC RX REV CODE- 250/636: Performed by: INTERNAL MEDICINE

## 2018-07-23 PROCEDURE — 85027 COMPLETE CBC AUTOMATED: CPT | Performed by: INTERNAL MEDICINE

## 2018-07-23 PROCEDURE — 74011000258 HC RX REV CODE- 258: Performed by: INTERNAL MEDICINE

## 2018-07-23 PROCEDURE — 77030018786 HC NDL GD F/USND BARD -B

## 2018-07-23 PROCEDURE — 74011250637 HC RX REV CODE- 250/637: Performed by: HOSPITALIST

## 2018-07-23 PROCEDURE — 80048 BASIC METABOLIC PNL TOTAL CA: CPT | Performed by: INTERNAL MEDICINE

## 2018-07-23 PROCEDURE — 82962 GLUCOSE BLOOD TEST: CPT

## 2018-07-23 PROCEDURE — C1751 CATH, INF, PER/CENT/MIDLINE: HCPCS

## 2018-07-23 PROCEDURE — 77030018719 HC DRSG PTCH ANTIMIC J&J -A

## 2018-07-23 PROCEDURE — 74011250636 HC RX REV CODE- 250/636: Performed by: HOSPITALIST

## 2018-07-23 PROCEDURE — 74011250637 HC RX REV CODE- 250/637: Performed by: INTERNAL MEDICINE

## 2018-07-23 PROCEDURE — 76937 US GUIDE VASCULAR ACCESS: CPT

## 2018-07-23 RX ORDER — SODIUM CHLORIDE 0.9 % (FLUSH) 0.9 %
10 SYRINGE (ML) INJECTION EVERY 8 HOURS
Status: DISCONTINUED | OUTPATIENT
Start: 2018-07-23 | End: 2018-07-27 | Stop reason: HOSPADM

## 2018-07-23 RX ORDER — SODIUM CHLORIDE 0.9 % (FLUSH) 0.9 %
10-40 SYRINGE (ML) INJECTION AS NEEDED
Status: DISCONTINUED | OUTPATIENT
Start: 2018-07-23 | End: 2018-07-27 | Stop reason: HOSPADM

## 2018-07-23 RX ORDER — ADHESIVE BANDAGE
30 BANDAGE TOPICAL DAILY PRN
Status: DISCONTINUED | OUTPATIENT
Start: 2018-07-23 | End: 2018-07-27 | Stop reason: HOSPADM

## 2018-07-23 RX ORDER — VANCOMYCIN/0.9 % SOD CHLORIDE 1.5G/250ML
1500 PLASTIC BAG, INJECTION (ML) INTRAVENOUS EVERY 12 HOURS
Status: DISCONTINUED | OUTPATIENT
Start: 2018-07-23 | End: 2018-07-26

## 2018-07-23 RX ADMIN — DIPHENHYDRAMINE HYDROCHLORIDE 12.5 MG: 50 INJECTION, SOLUTION INTRAMUSCULAR; INTRAVENOUS at 03:25

## 2018-07-23 RX ADMIN — DIPHENHYDRAMINE HYDROCHLORIDE 12.5 MG: 50 INJECTION, SOLUTION INTRAMUSCULAR; INTRAVENOUS at 09:49

## 2018-07-23 RX ADMIN — HYDROCODONE BITARTRATE AND ACETAMINOPHEN 1 TABLET: 10; 325 TABLET ORAL at 22:33

## 2018-07-23 RX ADMIN — CITALOPRAM HYDROBROMIDE 10 MG: 20 TABLET ORAL at 18:29

## 2018-07-23 RX ADMIN — Medication 10 ML: at 14:00

## 2018-07-23 RX ADMIN — HYDROCODONE BITARTRATE AND ACETAMINOPHEN 1 TABLET: 10; 325 TABLET ORAL at 18:29

## 2018-07-23 RX ADMIN — HYDROCODONE BITARTRATE AND ACETAMINOPHEN 1 TABLET: 10; 325 TABLET ORAL at 03:26

## 2018-07-23 RX ADMIN — HEPARIN SODIUM 5000 UNITS: 5000 INJECTION INTRAVENOUS; SUBCUTANEOUS at 20:49

## 2018-07-23 RX ADMIN — FOLIC ACID 1 MG: 1 TABLET ORAL at 08:23

## 2018-07-23 RX ADMIN — DIPHENHYDRAMINE HYDROCHLORIDE 12.5 MG: 50 INJECTION, SOLUTION INTRAMUSCULAR; INTRAVENOUS at 18:29

## 2018-07-23 RX ADMIN — VANCOMYCIN HYDROCHLORIDE 1250 MG: 10 INJECTION, POWDER, LYOPHILIZED, FOR SOLUTION INTRAVENOUS at 09:48

## 2018-07-23 RX ADMIN — HYDROMORPHONE HYDROCHLORIDE 1 MG: 2 INJECTION, SOLUTION INTRAMUSCULAR; INTRAVENOUS; SUBCUTANEOUS at 22:34

## 2018-07-23 RX ADMIN — ONDANSETRON 4 MG: 2 INJECTION INTRAMUSCULAR; INTRAVENOUS at 13:47

## 2018-07-23 RX ADMIN — HEPARIN SODIUM 5000 UNITS: 5000 INJECTION INTRAVENOUS; SUBCUTANEOUS at 13:48

## 2018-07-23 RX ADMIN — HYDROCODONE BITARTRATE AND ACETAMINOPHEN 1 TABLET: 10; 325 TABLET ORAL at 09:49

## 2018-07-23 RX ADMIN — Medication 10 ML: at 09:48

## 2018-07-23 RX ADMIN — DIPHENHYDRAMINE HYDROCHLORIDE 12.5 MG: 50 INJECTION, SOLUTION INTRAMUSCULAR; INTRAVENOUS at 22:33

## 2018-07-23 RX ADMIN — HYDROMORPHONE HYDROCHLORIDE 1 MG: 2 INJECTION, SOLUTION INTRAMUSCULAR; INTRAVENOUS; SUBCUTANEOUS at 13:48

## 2018-07-23 RX ADMIN — CEFTRIAXONE 1 G: 1 INJECTION, POWDER, FOR SOLUTION INTRAMUSCULAR; INTRAVENOUS at 20:49

## 2018-07-23 RX ADMIN — VANCOMYCIN HYDROCHLORIDE 1500 MG: 10 INJECTION, POWDER, LYOPHILIZED, FOR SOLUTION INTRAVENOUS at 21:53

## 2018-07-23 RX ADMIN — Medication 10 ML: at 21:53

## 2018-07-23 RX ADMIN — ONDANSETRON 4 MG: 2 INJECTION INTRAMUSCULAR; INTRAVENOUS at 22:34

## 2018-07-23 RX ADMIN — DEXTROSE MONOHYDRATE, SODIUM CHLORIDE, AND POTASSIUM CHLORIDE 75 ML/HR: 50; 4.5; 1.49 INJECTION, SOLUTION INTRAVENOUS at 12:21

## 2018-07-23 RX ADMIN — DIPHENHYDRAMINE HYDROCHLORIDE 12.5 MG: 50 INJECTION, SOLUTION INTRAMUSCULAR; INTRAVENOUS at 13:48

## 2018-07-23 RX ADMIN — MAGNESIUM HYDROXIDE 30 ML: 400 SUSPENSION ORAL at 13:47

## 2018-07-23 RX ADMIN — HYDROCODONE BITARTRATE AND ACETAMINOPHEN 1 TABLET: 10; 325 TABLET ORAL at 13:47

## 2018-07-23 NOTE — PROGRESS NOTES
General Daily Progress Note    Admit Date: 7/15/2018    Subjective:     Patient continues to complain of back and arm pain. Current Facility-Administered Medications   Medication Dose Route Frequency    VANCOMYCIN TROUGH  1 Each Other ONCE    dextrose 5% - 0.45% NaCl with KCl 20 mEq/L infusion  75 mL/hr IntraVENous CONTINUOUS    cefTRIAXone (ROCEPHIN) 1 g in 0.9% sodium chloride (MBP/ADV) 50 mL  1 g IntraVENous Q24H    vancomycin (VANCOCIN) 1250 mg in  ml infusion  1,250 mg IntraVENous Q12H    acetaminophen (TYLENOL) tablet 650 mg  650 mg Oral Q4H PRN    HYDROmorphone (PF) (DILAUDID) injection 1 mg  1 mg IntraVENous Q8H PRN    diphenhydrAMINE (BENADRYL) injection 12.5 mg  12.5 mg IntraVENous Q4H PRN    fentaNYL (DURAGESIC) 75 mcg/hr patch 1 Patch  1 Patch TransDERmal Q72H    citalopram (CELEXA) tablet 10 mg  10 mg Oral QPM    folic acid (FOLVITE) tablet 1 mg  1 mg Oral DAILY    sodium chloride (NS) flush 5-10 mL  5-10 mL IntraVENous Q8H    sodium chloride (NS) flush 5-10 mL  5-10 mL IntraVENous PRN    heparin (porcine) injection 5,000 Units  5,000 Units SubCUTAneous Q8H    ondansetron (ZOFRAN) injection 4 mg  4 mg IntraVENous Q6H PRN    promethazine (PHENERGAN) 12.5 mg in 0.9% sodium chloride 50 mL IVPB  12.5 mg IntraVENous Q4H PRN    HYDROcodone-acetaminophen (NORCO)  mg tablet 1 Tab  1 Tab Oral Q4H PRN        Review of Systems  A comprehensive review of systems was negative.     Objective:     Patient Vitals for the past 24 hrs:   BP Temp Pulse Resp SpO2   07/23/18 0744 115/52 98.8 °F (37.1 °C) 89 17 97 %   07/22/18 2358 135/81 96.9 °F (36.1 °C) 98 16 100 %   07/22/18 1431 139/72 98.1 °F (36.7 °C) 100 16 99 %        07/21 1901 - 07/23 0700  In: 4858.8 [P.O.:960; I.V.:3898.8]  Out: -     Physical Exam:   Visit Vitals    /52    Pulse 89    Temp 98.8 °F (37.1 °C)    Resp 17    Ht 5' 11\" (1.803 m)    Wt 188 lb 15 oz (85.7 kg)    SpO2 97%    BMI 26.35 kg/m2     General appearance: alert, cooperative, no distress, appears stated age  Neck: supple, symmetrical, trachea midline, no adenopathy, thyroid: not enlarged, symmetric, no tenderness/mass/nodules, no carotid bruit and no JVD  Lungs: clear to auscultation bilaterally  Heart: regular rate and rhythm, S1, S2 normal, no murmur, click, rub or gallop  Abdomen: soft, non-tender.  Bowel sounds normal. No masses,  no organomegaly  Extremities: extremities normal, atraumatic, no cyanosis or edema        Data Review   Recent Results (from the past 24 hour(s))   GLUCOSE, POC    Collection Time: 07/22/18 12:03 PM   Result Value Ref Range    Glucose (POC) 105 (H) 65 - 100 mg/dL    Performed by Yash Rooney (PCT)    GLUCOSE, POC    Collection Time: 07/22/18  4:39 PM   Result Value Ref Range    Glucose (POC) 105 (H) 65 - 100 mg/dL    Performed by Yash Rooney (PCT)    GLUCOSE, POC    Collection Time: 07/22/18  9:07 PM   Result Value Ref Range    Glucose (POC) 97 65 - 100 mg/dL    Performed by Ayah Desai, BASIC    Collection Time: 07/23/18  4:58 AM   Result Value Ref Range    Sodium 138 136 - 145 mmol/L    Potassium 4.4 3.5 - 5.1 mmol/L    Chloride 106 97 - 108 mmol/L    CO2 25 21 - 32 mmol/L    Anion gap 7 5 - 15 mmol/L    Glucose 85 65 - 100 mg/dL    BUN 7 6 - 20 MG/DL    Creatinine 0.79 0.55 - 1.02 MG/DL    BUN/Creatinine ratio 9 (L) 12 - 20      GFR est AA >60 >60 ml/min/1.73m2    GFR est non-AA >60 >60 ml/min/1.73m2    Calcium 8.8 8.5 - 10.1 MG/DL   CBC WITH MANUAL DIFF    Collection Time: 07/23/18  4:58 AM   Result Value Ref Range    WBC 7.5 3.6 - 11.0 K/uL    RBC 2.08 (L) 3.80 - 5.20 M/uL    HGB 6.9 (L) 11.5 - 16.0 g/dL    HCT 19.6 (L) 35.0 - 47.0 %    MCV 94.2 80.0 - 99.0 FL    MCH 33.2 26.0 - 34.0 PG    MCHC 35.2 30.0 - 36.5 g/dL    RDW 16.0 (H) 11.5 - 14.5 %    PLATELET 521 892 - 363 K/uL    MPV 12.0 8.9 - 12.9 FL    NRBC 0.3 (H) 0  WBC    ABSOLUTE NRBC 0.02 (H) 0.00 - 0.01 K/uL    NEUTROPHILS PENDING %    LYMPHOCYTES PENDING %    MONOCYTES PENDING %    EOSINOPHILS PENDING %    BASOPHILS PENDING %    IMMATURE GRANULOCYTES PENDING %    BAND NEUTROPHILS PENDING %    PROMYELOCYTES PENDING %    MYELOCYTES PENDING %    METAMYELOCYTES PENDING %    BLASTS PENDING %    OTHER CELL PENDING     ABS. NEUTROPHILS PENDING K/UL    ABS. LYMPHOCYTES PENDING K/UL    ABS. MONOCYTES PENDING K/UL    ABS. EOSINOPHILS PENDING K/UL    ABS. BASOPHILS PENDING K/UL    ABS. IMM. GRANS. PENDING K/UL    RBC COMMENTS PENDING     DIFFERENTIAL PENDING    GLUCOSE, POC    Collection Time: 07/23/18  8:27 AM   Result Value Ref Range    Glucose (POC) 103 (H) 65 - 100 mg/dL    Performed by [facility] (PCT)            Assessment:     Principal Problem:    Intractable nausea and vomiting (7/15/2018)    Active Problems:    Nausea and vomiting (7/15/2018)      Vomiting (7/17/2018)        Plan:     1. Continue treatment of painful crises. 2.  Hospital-acquired pneumonia improving. 3.  Will mobilize.

## 2018-07-23 NOTE — PROGRESS NOTES
Bedside shift change report given to [address] (oncoming nurse) by Luis Davis RN (offgoing nurse). Report included the following information SBAR, Kardex, Intake/Output, MAR and Recent Results.

## 2018-07-23 NOTE — PROGRESS NOTES
Spiritual Care Assessment/Progress Note  [facility]      NAME: Ever Calero      MRN: [id number]  AGE: 56 y.o. SEX: female  Zoroastrianism Affiliation: Non Christian   Language: English     7/23/2018     Total Time (in minutes): 5     Spiritual Assessment begun in [facility] 3 MED TELE through conversation with:         []Patient        [] Family    [] Friend(s)        Reason for Consult: Initial/Spiritual assessment, patient floor     Spiritual beliefs: (Please include comment if needed)     [] Identifies with a zay tradition:         [] Supported by a zay community:            [] Claims no spiritual orientation:           [] Seeking spiritual identity:                [] Adheres to an individual form of spirituality:           [x] Not able to assess:                           Identified resources for coping:      [] Prayer                               [] Music                  [] Guided Imagery     [] Family/friends                 [] Pet visits     [] Devotional reading                         [] Unknown     [] Other:                                              Interventions offered during this visit: (See comments for more details)    Patient Interventions: Initial visit           Plan of Care:     [] Support spiritual and/or cultural needs    [] Support AMD and/or advance care planning process      [] Support grieving process   [] Coordinate Rites and/or Rituals    [] Coordination with community clergy   [] No spiritual needs identified at this time   [] Detailed Plan of Care below (See Comments)  [] Make referral to Music Therapy  [] Make referral to Pet Therapy     [] Make referral to Addiction services  [] Make referral to [facility]  [] Make referral to Spiritual Care Partner  [] No future visits requested        [x] Follow up visits as needed   Attempted Initial Spiritual Assessment in [phone] with LOS pt. Found pt was not in room.  Consulted with RN who shared that pt was probably in the restroom. No family present at this time. Will attempt follow up at another time as able/needed. CATALINO Kim. Pierce Christian

## 2018-07-23 NOTE — PROGRESS NOTES
MIDLINE Insertion Procedure  Note:     Procedure explained to  pt  along with risks and benefits. Procedure teaching completed. Pre procedure assessment done. Maximum sterile barrier precautions observed throughout procedure. Lidocaine 1 %  3.0   ml sc injected to site prior to access the vein . Cannulated   basilic   vein using ultrasound guidance. Inserted  4   Fr. Single   lumen midline to   right    arm. Blood return verified and  flushed with 20ml normal saline  to  port. Sterile dressing applied with biopatch, statLock and occlusive dressing as per protocol. Curos cap applied to port. Patient tolerated procedure well with minimal blood loss. Reason for access : Reliable IV Access / limited vascular access     Complications related to insertion : None   This midline to be removed on or before  8/22/2018  See nursing message  for midline reminders  Inserted by : Anshu Maciel RN. DALJIT. CMSRGARY. PICC Nurse  Assisted by : Nury Gayle RN PICC Nurse  Total Length : 13  cm   External Length :  0     cm   Arm circumference :    29   cm  Catheter occupies   13   % of vein. Type of Midline:  Bard [employer] Midline  Ref#:  [id number]  Lot#:   [id number]  Expiration Date:    2019-10-31    Anshu Maciel RN. DALJIT. CMSRGARY. PICC nurse.  Vascular Access Team

## 2018-07-24 LAB
ANION GAP SERPL CALC-SCNC: 4 MMOL/L (ref 5–15)
BASOPHILS # BLD: 0.1 K/UL (ref 0–0.1)
BASOPHILS NFR BLD: 1 % (ref 0–1)
BLASTS NFR BLD MANUAL: 0 %
BUN SERPL-MCNC: 7 MG/DL (ref 6–20)
BUN/CREAT SERPL: 10 (ref 12–20)
CALCIUM SERPL-MCNC: 8.5 MG/DL (ref 8.5–10.1)
CHLORIDE SERPL-SCNC: 107 MMOL/L (ref 97–108)
CO2 SERPL-SCNC: 27 MMOL/L (ref 21–32)
CREAT SERPL-MCNC: 0.73 MG/DL (ref 0.55–1.02)
DIFFERENTIAL METHOD BLD: ABNORMAL
EOSINOPHIL # BLD: 0.5 K/UL (ref 0–0.4)
EOSINOPHIL NFR BLD: 6 % (ref 0–7)
ERYTHROCYTE [DISTWIDTH] IN BLOOD BY AUTOMATED COUNT: 16.1 % (ref 11.5–14.5)
GLUCOSE BLD STRIP.AUTO-MCNC: 98 MG/DL (ref 65–100)
GLUCOSE SERPL-MCNC: 79 MG/DL (ref 65–100)
HCT VFR BLD AUTO: 18.2 % (ref 35–47)
HGB BLD-MCNC: 6.3 G/DL (ref 11.5–16)
IMM GRANULOCYTES # BLD: 0 K/UL
IMM GRANULOCYTES NFR BLD AUTO: 0 %
LYMPHOCYTES # BLD: 3.6 K/UL (ref 0.8–3.5)
LYMPHOCYTES NFR BLD: 47 % (ref 12–49)
MCH RBC QN AUTO: 32.8 PG (ref 26–34)
MCHC RBC AUTO-ENTMCNC: 34.6 G/DL (ref 30–36.5)
MCV RBC AUTO: 94.8 FL (ref 80–99)
METAMYELOCYTES NFR BLD MANUAL: 1 %
MONOCYTES # BLD: 1.3 K/UL (ref 0–1)
MONOCYTES NFR BLD: 17 % (ref 5–13)
MYELOCYTES NFR BLD MANUAL: 0 %
NEUTS BAND NFR BLD MANUAL: 0 % (ref 0–6)
NEUTS SEG # BLD: 2.1 K/UL (ref 1.8–8)
NEUTS SEG NFR BLD: 28 % (ref 32–75)
NRBC # BLD: 0.02 K/UL (ref 0–0.01)
NRBC BLD-RTO: 0.3 PER 100 WBC
OTHER CELLS NFR BLD MANUAL: 0 %
PLATELET # BLD AUTO: 190 K/UL (ref 150–400)
PMV BLD AUTO: 11.7 FL (ref 8.9–12.9)
POTASSIUM SERPL-SCNC: 4.3 MMOL/L (ref 3.5–5.1)
PROMYELOCYTES NFR BLD MANUAL: 0 %
RBC # BLD AUTO: 1.92 M/UL (ref 3.8–5.2)
RBC MORPH BLD: ABNORMAL
SERVICE CMNT-IMP: NORMAL
SODIUM SERPL-SCNC: 138 MMOL/L (ref 136–145)
WBC # BLD AUTO: 7.6 K/UL (ref 3.6–11)

## 2018-07-24 PROCEDURE — 80048 BASIC METABOLIC PNL TOTAL CA: CPT | Performed by: INTERNAL MEDICINE

## 2018-07-24 PROCEDURE — 82962 GLUCOSE BLOOD TEST: CPT

## 2018-07-24 PROCEDURE — 74011250636 HC RX REV CODE- 250/636: Performed by: INTERNAL MEDICINE

## 2018-07-24 PROCEDURE — 36415 COLL VENOUS BLD VENIPUNCTURE: CPT | Performed by: INTERNAL MEDICINE

## 2018-07-24 PROCEDURE — 65270000029 HC RM PRIVATE

## 2018-07-24 PROCEDURE — 74011250637 HC RX REV CODE- 250/637: Performed by: HOSPITALIST

## 2018-07-24 PROCEDURE — 74011250636 HC RX REV CODE- 250/636: Performed by: HOSPITALIST

## 2018-07-24 PROCEDURE — 85027 COMPLETE CBC AUTOMATED: CPT | Performed by: INTERNAL MEDICINE

## 2018-07-24 PROCEDURE — 74011000258 HC RX REV CODE- 258: Performed by: INTERNAL MEDICINE

## 2018-07-24 RX ORDER — ENOXAPARIN SODIUM 100 MG/ML
40 INJECTION SUBCUTANEOUS EVERY 24 HOURS
Status: DISCONTINUED | OUTPATIENT
Start: 2018-07-25 | End: 2018-07-27 | Stop reason: HOSPADM

## 2018-07-24 RX ORDER — ENOXAPARIN SODIUM 100 MG/ML
30 INJECTION SUBCUTANEOUS EVERY 24 HOURS
Status: DISCONTINUED | OUTPATIENT
Start: 2018-07-24 | End: 2018-07-24

## 2018-07-24 RX ADMIN — HYDROMORPHONE HYDROCHLORIDE 1 MG: 2 INJECTION, SOLUTION INTRAMUSCULAR; INTRAVENOUS; SUBCUTANEOUS at 22:11

## 2018-07-24 RX ADMIN — ONDANSETRON 4 MG: 2 INJECTION INTRAMUSCULAR; INTRAVENOUS at 14:13

## 2018-07-24 RX ADMIN — Medication 10 ML: at 05:44

## 2018-07-24 RX ADMIN — HYDROCODONE BITARTRATE AND ACETAMINOPHEN 1 TABLET: 10; 325 TABLET ORAL at 06:04

## 2018-07-24 RX ADMIN — Medication 10 ML: at 10:02

## 2018-07-24 RX ADMIN — VANCOMYCIN HYDROCHLORIDE 1500 MG: 10 INJECTION, POWDER, LYOPHILIZED, FOR SOLUTION INTRAVENOUS at 22:11

## 2018-07-24 RX ADMIN — Medication 10 ML: at 18:23

## 2018-07-24 RX ADMIN — HYDROCODONE BITARTRATE AND ACETAMINOPHEN 1 TABLET: 10; 325 TABLET ORAL at 22:11

## 2018-07-24 RX ADMIN — CEFTRIAXONE 1 G: 1 INJECTION, POWDER, FOR SOLUTION INTRAMUSCULAR; INTRAVENOUS at 20:47

## 2018-07-24 RX ADMIN — ONDANSETRON 4 MG: 2 INJECTION INTRAMUSCULAR; INTRAVENOUS at 22:11

## 2018-07-24 RX ADMIN — VANCOMYCIN HYDROCHLORIDE 1500 MG: 10 INJECTION, POWDER, LYOPHILIZED, FOR SOLUTION INTRAVENOUS at 08:11

## 2018-07-24 RX ADMIN — Medication 30 ML: at 05:59

## 2018-07-24 RX ADMIN — Medication 10 ML: at 14:13

## 2018-07-24 RX ADMIN — FOLIC ACID 1 MG: 1 TABLET ORAL at 08:11

## 2018-07-24 RX ADMIN — Medication 10 ML: at 22:12

## 2018-07-24 RX ADMIN — DIPHENHYDRAMINE HYDROCHLORIDE 12.5 MG: 50 INJECTION, SOLUTION INTRAMUSCULAR; INTRAVENOUS at 18:21

## 2018-07-24 RX ADMIN — HYDROCODONE BITARTRATE AND ACETAMINOPHEN 1 TABLET: 10; 325 TABLET ORAL at 18:21

## 2018-07-24 RX ADMIN — DIPHENHYDRAMINE HYDROCHLORIDE 12.5 MG: 50 INJECTION, SOLUTION INTRAMUSCULAR; INTRAVENOUS at 06:04

## 2018-07-24 RX ADMIN — HYDROCODONE BITARTRATE AND ACETAMINOPHEN 1 TABLET: 10; 325 TABLET ORAL at 02:36

## 2018-07-24 RX ADMIN — Medication 10 ML: at 02:38

## 2018-07-24 RX ADMIN — DEXTROSE MONOHYDRATE, SODIUM CHLORIDE, AND POTASSIUM CHLORIDE 75 ML/HR: 50; 4.5; 1.49 INJECTION, SOLUTION INTRAVENOUS at 20:46

## 2018-07-24 RX ADMIN — HYDROMORPHONE HYDROCHLORIDE 1 MG: 2 INJECTION, SOLUTION INTRAMUSCULAR; INTRAVENOUS; SUBCUTANEOUS at 06:04

## 2018-07-24 RX ADMIN — HYDROMORPHONE HYDROCHLORIDE 1 MG: 2 INJECTION, SOLUTION INTRAMUSCULAR; INTRAVENOUS; SUBCUTANEOUS at 14:15

## 2018-07-24 RX ADMIN — Medication 10 ML: at 22:26

## 2018-07-24 RX ADMIN — CITALOPRAM HYDROBROMIDE 10 MG: 20 TABLET ORAL at 17:08

## 2018-07-24 RX ADMIN — HEPARIN SODIUM 5000 UNITS: 5000 INJECTION INTRAVENOUS; SUBCUTANEOUS at 05:43

## 2018-07-24 RX ADMIN — DIPHENHYDRAMINE HYDROCHLORIDE 12.5 MG: 50 INJECTION, SOLUTION INTRAMUSCULAR; INTRAVENOUS at 02:36

## 2018-07-24 RX ADMIN — ONDANSETRON 4 MG: 2 INJECTION INTRAMUSCULAR; INTRAVENOUS at 06:04

## 2018-07-24 RX ADMIN — DEXTROSE MONOHYDRATE, SODIUM CHLORIDE, AND POTASSIUM CHLORIDE 75 ML/HR: 50; 4.5; 1.49 INJECTION, SOLUTION INTRAVENOUS at 01:10

## 2018-07-24 RX ADMIN — HYDROCODONE BITARTRATE AND ACETAMINOPHEN 1 TABLET: 10; 325 TABLET ORAL at 14:15

## 2018-07-24 RX ADMIN — DIPHENHYDRAMINE HYDROCHLORIDE 12.5 MG: 50 INJECTION, SOLUTION INTRAMUSCULAR; INTRAVENOUS at 10:01

## 2018-07-24 RX ADMIN — ENOXAPARIN SODIUM 30 MG: 100 INJECTION SUBCUTANEOUS at 10:55

## 2018-07-24 RX ADMIN — DIPHENHYDRAMINE HYDROCHLORIDE 12.5 MG: 50 INJECTION, SOLUTION INTRAMUSCULAR; INTRAVENOUS at 14:14

## 2018-07-24 RX ADMIN — HYDROCODONE BITARTRATE AND ACETAMINOPHEN 1 TABLET: 10; 325 TABLET ORAL at 10:02

## 2018-07-24 RX ADMIN — Medication 10 ML: at 08:16

## 2018-07-24 RX ADMIN — DIPHENHYDRAMINE HYDROCHLORIDE 12.5 MG: 50 INJECTION, SOLUTION INTRAMUSCULAR; INTRAVENOUS at 22:11

## 2018-07-24 NOTE — PROGRESS NOTES
Bedside and Verbal shift change report given to Jameel (oncoming nurse) by TOÑITO NORMAN [facility] (offgoing nurse). Report included the following information SBAR, Kardex, Intake/Output, MAR, Recent Results and Med Rec Status.

## 2018-07-24 NOTE — PROGRESS NOTES
Bedside shift change report given to Max Tristan (oncoming nurse) by Randi Johnson (offgoing nurse). Report included the following information SBAR, Kardex, Intake/Output, MAR, Recent Results and Cardiac Rhythm . PRN medications given. Board updated with times next dose is due.

## 2018-07-24 NOTE — PROGRESS NOTES
Problem: Falls - Risk of  Goal: *Absence of Falls  Document Mukund Fall Risk and appropriate interventions in the flowsheet.    Outcome: Progressing Towards Goal  Fall Risk Interventions:            Medication Interventions: Evaluate medications/consider consulting pharmacy         History of Falls Interventions: Door open when patient unattended

## 2018-07-25 ENCOUNTER — APPOINTMENT (OUTPATIENT)
Dept: GENERAL RADIOLOGY | Age: 58
DRG: 811 | End: 2018-07-25
Attending: INTERNAL MEDICINE
Payer: MEDICARE

## 2018-07-25 LAB
ANION GAP SERPL CALC-SCNC: 3 MMOL/L (ref 5–15)
BUN SERPL-MCNC: 7 MG/DL (ref 6–20)
BUN/CREAT SERPL: 9 (ref 12–20)
CALCIUM SERPL-MCNC: 8.6 MG/DL (ref 8.5–10.1)
CHLORIDE SERPL-SCNC: 106 MMOL/L (ref 97–108)
CO2 SERPL-SCNC: 28 MMOL/L (ref 21–32)
CREAT SERPL-MCNC: 0.79 MG/DL (ref 0.55–1.02)
DATE LAST DOSE: ABNORMAL
GLUCOSE SERPL-MCNC: 101 MG/DL (ref 65–100)
POTASSIUM SERPL-SCNC: 4 MMOL/L (ref 3.5–5.1)
REPORTED DOSE,DOSE: ABNORMAL UNITS
REPORTED DOSE/TIME,TMG: ABNORMAL
SODIUM SERPL-SCNC: 137 MMOL/L (ref 136–145)
VANCOMYCIN TROUGH SERPL-MCNC: 22.1 UG/ML (ref 5–10)

## 2018-07-25 PROCEDURE — 74011000258 HC RX REV CODE- 258: Performed by: INTERNAL MEDICINE

## 2018-07-25 PROCEDURE — 80048 BASIC METABOLIC PNL TOTAL CA: CPT | Performed by: INTERNAL MEDICINE

## 2018-07-25 PROCEDURE — 74011250637 HC RX REV CODE- 250/637: Performed by: HOSPITALIST

## 2018-07-25 PROCEDURE — 36415 COLL VENOUS BLD VENIPUNCTURE: CPT | Performed by: INTERNAL MEDICINE

## 2018-07-25 PROCEDURE — 65270000029 HC RM PRIVATE

## 2018-07-25 PROCEDURE — 74011250637 HC RX REV CODE- 250/637: Performed by: INTERNAL MEDICINE

## 2018-07-25 PROCEDURE — 74011250636 HC RX REV CODE- 250/636: Performed by: INTERNAL MEDICINE

## 2018-07-25 PROCEDURE — 71045 X-RAY EXAM CHEST 1 VIEW: CPT

## 2018-07-25 PROCEDURE — 74011250636 HC RX REV CODE- 250/636: Performed by: HOSPITALIST

## 2018-07-25 PROCEDURE — 80202 ASSAY OF VANCOMYCIN: CPT | Performed by: INTERNAL MEDICINE

## 2018-07-25 RX ADMIN — HYDROCODONE BITARTRATE AND ACETAMINOPHEN 1 TABLET: 10; 325 TABLET ORAL at 10:23

## 2018-07-25 RX ADMIN — DIPHENHYDRAMINE HYDROCHLORIDE 12.5 MG: 50 INJECTION, SOLUTION INTRAMUSCULAR; INTRAVENOUS at 10:23

## 2018-07-25 RX ADMIN — Medication 10 ML: at 22:37

## 2018-07-25 RX ADMIN — DIPHENHYDRAMINE HYDROCHLORIDE 12.5 MG: 50 INJECTION, SOLUTION INTRAMUSCULAR; INTRAVENOUS at 18:01

## 2018-07-25 RX ADMIN — HYDROMORPHONE HYDROCHLORIDE 1 MG: 2 INJECTION, SOLUTION INTRAMUSCULAR; INTRAVENOUS; SUBCUTANEOUS at 06:13

## 2018-07-25 RX ADMIN — DIPHENHYDRAMINE HYDROCHLORIDE 12.5 MG: 50 INJECTION, SOLUTION INTRAMUSCULAR; INTRAVENOUS at 06:13

## 2018-07-25 RX ADMIN — HYDROCODONE BITARTRATE AND ACETAMINOPHEN 1 TABLET: 10; 325 TABLET ORAL at 02:13

## 2018-07-25 RX ADMIN — Medication 10 ML: at 18:02

## 2018-07-25 RX ADMIN — ONDANSETRON 4 MG: 2 INJECTION INTRAMUSCULAR; INTRAVENOUS at 14:25

## 2018-07-25 RX ADMIN — DIPHENHYDRAMINE HYDROCHLORIDE 12.5 MG: 50 INJECTION, SOLUTION INTRAMUSCULAR; INTRAVENOUS at 14:25

## 2018-07-25 RX ADMIN — ONDANSETRON 4 MG: 2 INJECTION INTRAMUSCULAR; INTRAVENOUS at 06:13

## 2018-07-25 RX ADMIN — MAGNESIUM HYDROXIDE 30 ML: 400 SUSPENSION ORAL at 14:33

## 2018-07-25 RX ADMIN — DIPHENHYDRAMINE HYDROCHLORIDE 12.5 MG: 50 INJECTION, SOLUTION INTRAMUSCULAR; INTRAVENOUS at 02:13

## 2018-07-25 RX ADMIN — Medication 10 ML: at 06:13

## 2018-07-25 RX ADMIN — HYDROCODONE BITARTRATE AND ACETAMINOPHEN 1 TABLET: 10; 325 TABLET ORAL at 06:12

## 2018-07-25 RX ADMIN — HYDROMORPHONE HYDROCHLORIDE 1 MG: 2 INJECTION, SOLUTION INTRAMUSCULAR; INTRAVENOUS; SUBCUTANEOUS at 14:25

## 2018-07-25 RX ADMIN — HYDROMORPHONE HYDROCHLORIDE 1 MG: 2 INJECTION, SOLUTION INTRAMUSCULAR; INTRAVENOUS; SUBCUTANEOUS at 22:30

## 2018-07-25 RX ADMIN — HYDROCODONE BITARTRATE AND ACETAMINOPHEN 1 TABLET: 10; 325 TABLET ORAL at 18:01

## 2018-07-25 RX ADMIN — Medication 1 CAPSULE: at 12:23

## 2018-07-25 RX ADMIN — CITALOPRAM HYDROBROMIDE 10 MG: 20 TABLET ORAL at 18:01

## 2018-07-25 RX ADMIN — ENOXAPARIN SODIUM 40 MG: 100 INJECTION SUBCUTANEOUS at 12:23

## 2018-07-25 RX ADMIN — HYDROCODONE BITARTRATE AND ACETAMINOPHEN 1 TABLET: 10; 325 TABLET ORAL at 22:34

## 2018-07-25 RX ADMIN — VANCOMYCIN HYDROCHLORIDE 1500 MG: 10 INJECTION, POWDER, LYOPHILIZED, FOR SOLUTION INTRAVENOUS at 08:11

## 2018-07-25 RX ADMIN — CEFTRIAXONE 1 G: 1 INJECTION, POWDER, FOR SOLUTION INTRAMUSCULAR; INTRAVENOUS at 20:53

## 2018-07-25 RX ADMIN — HYDROCODONE BITARTRATE AND ACETAMINOPHEN 1 TABLET: 10; 325 TABLET ORAL at 14:25

## 2018-07-25 RX ADMIN — DIPHENHYDRAMINE HYDROCHLORIDE 12.5 MG: 50 INJECTION, SOLUTION INTRAMUSCULAR; INTRAVENOUS at 22:31

## 2018-07-25 RX ADMIN — ONDANSETRON 4 MG: 2 INJECTION INTRAMUSCULAR; INTRAVENOUS at 22:33

## 2018-07-25 RX ADMIN — DEXTROSE MONOHYDRATE, SODIUM CHLORIDE, AND POTASSIUM CHLORIDE 75 ML/HR: 50; 4.5; 1.49 INJECTION, SOLUTION INTRAVENOUS at 19:56

## 2018-07-25 RX ADMIN — FOLIC ACID 1 MG: 1 TABLET ORAL at 08:10

## 2018-07-25 RX ADMIN — VANCOMYCIN HYDROCHLORIDE 1500 MG: 10 INJECTION, POWDER, LYOPHILIZED, FOR SOLUTION INTRAVENOUS at 21:00

## 2018-07-25 NOTE — PROGRESS NOTES
Pt will d/c to home without d/c needs when stable. Her family will transport.   Await CXR today per MD.

## 2018-07-25 NOTE — PROGRESS NOTES
Pharmacy Automatic Renal Dosing Protocol - Antimicrobials    Indication for Antimicrobials: HCAP    Current Regimen of Each Antimicrobial:  Ceftriaxone 1 gm IV every 24 hours (Started 18; Day #5)  Vancomycin 1250 mg IV every 12 hours (Started 18; Day #5)    Previous Antimicrobial Therapy:  None ordered    Vancomycin Goal Trough: ~15 mcg/mL    Vancomycin Level Assessment:  Date Dose & Interval Measured (mcg/mL) Steady State (mcg/mL)   18 1250 Q12H  1500 Q12H 14.6  22.1 11.73  18.1       Significant Cultures:   18 Blood culture = NG - Prelim    Labs:  Recent Labs      18   0548  18   0458  18   0353   CREA  0.73  0.79  0.78   BUN  7  7  7   WBC  7.6  7.5  8.1   Temp (24hrs), Av.6 °F (37 °C), Min:97.2 °F (36.2 °C), Max:99.3 °F (37.4 °C)    Creatinine Clearance (mL/min) or Dialysis: Greater than 60 mL/min    Impression/Plan:   - Extrapolated trough (previous dose given late) = 18.1 mcg/ml, which is at goal of 15-20  - Ceftriaxone dosed appropriately based on indication. Continue current regimen. - Antimicrobial stop date: To be determined     Pharmacy will follow daily and adjust medications as appropriate for renal function and/or serum levels.     Thank you,  Yuli Cope, PHARMD

## 2018-07-25 NOTE — PROGRESS NOTES
General Daily Progress Note    Admit Date: 7/15/2018    Subjective:     Patient has no complaint . Current Facility-Administered Medications   Medication Dose Route Frequency    enoxaparin (LOVENOX) injection 40 mg  40 mg SubCUTAneous Q24H    sodium chloride (NS) flush 10-40 mL  10-40 mL IntraVENous PRN    sodium chloride (NS) flush 10 mL  10 mL IntraVENous Q8H    magnesium hydroxide (MILK OF MAGNESIA) 400 mg/5 mL oral suspension 30 mL  30 mL Oral DAILY PRN    vancomycin (VANCOCIN) 1500 mg in  ml infusion  1,500 mg IntraVENous Q12H    dextrose 5% - 0.45% NaCl with KCl 20 mEq/L infusion  75 mL/hr IntraVENous CONTINUOUS    cefTRIAXone (ROCEPHIN) 1 g in 0.9% sodium chloride (MBP/ADV) 50 mL  1 g IntraVENous Q24H    acetaminophen (TYLENOL) tablet 650 mg  650 mg Oral Q4H PRN    HYDROmorphone (PF) (DILAUDID) injection 1 mg  1 mg IntraVENous Q8H PRN    diphenhydrAMINE (BENADRYL) injection 12.5 mg  12.5 mg IntraVENous Q4H PRN    fentaNYL (DURAGESIC) 75 mcg/hr patch 1 Patch  1 Patch TransDERmal Q72H    citalopram (CELEXA) tablet 10 mg  10 mg Oral QPM    folic acid (FOLVITE) tablet 1 mg  1 mg Oral DAILY    sodium chloride (NS) flush 5-10 mL  5-10 mL IntraVENous Q8H    ondansetron (ZOFRAN) injection 4 mg  4 mg IntraVENous Q6H PRN    promethazine (PHENERGAN) 12.5 mg in 0.9% sodium chloride 50 mL IVPB  12.5 mg IntraVENous Q4H PRN    HYDROcodone-acetaminophen (NORCO)  mg tablet 1 Tab  1 Tab Oral Q4H PRN        Review of Systems  A comprehensive review of systems was negative.     Objective:     Patient Vitals for the past 24 hrs:   BP Temp Pulse Resp SpO2   07/25/18 0722 121/74 96.4 °F (35.8 °C) 85 18 97 %   07/24/18 2234 117/68 98.9 °F (37.2 °C) 85 16 98 %   07/24/18 1651 125/75 98.9 °F (37.2 °C) 91 20 100 %        07/23 1901 - 07/25 0700  In: 7710 [P.O.:1440; I.V.:2650]  Out: -     Physical Exam:   Visit Vitals    /74 (BP 1 Location: Left arm, BP Patient Position: At rest)    Pulse 85    Temp 96.4 °F (35.8 °C)    Resp 18    Ht 5' 11\" (1.803 m)    Wt 188 lb 15 oz (85.7 kg)    SpO2 97%    BMI 26.35 kg/m2     General appearance: alert, cooperative, no distress, appears stated age  Neck: supple, symmetrical, trachea midline, no adenopathy, thyroid: not enlarged, symmetric, no tenderness/mass/nodules, no carotid bruit and no JVD  Lungs: clear to auscultation bilaterally  Heart: regular rate and rhythm, S1, S2 normal, no murmur, click, rub or gallop  Abdomen: soft, non-tender. Bowel sounds normal. No masses,  no organomegaly  Extremities: extremities normal, atraumatic, no cyanosis or edema        Data Review   Recent Results (from the past 24 hour(s))   METABOLIC PANEL, BASIC    Collection Time: 07/25/18  2:17 AM   Result Value Ref Range    Sodium 137 136 - 145 mmol/L    Potassium 4.0 3.5 - 5.1 mmol/L    Chloride 106 97 - 108 mmol/L    CO2 28 21 - 32 mmol/L    Anion gap 3 (L) 5 - 15 mmol/L    Glucose 101 (H) 65 - 100 mg/dL    BUN 7 6 - 20 MG/DL    Creatinine 0.79 0.55 - 1.02 MG/DL    BUN/Creatinine ratio 9 (L) 12 - 20      GFR est AA >60 >60 ml/min/1.73m2    GFR est non-AA >60 >60 ml/min/1.73m2    Calcium 8.6 8.5 - 10.1 MG/DL           Assessment:     Principal Problem:    Intractable nausea and vomiting (7/15/2018)    Active Problems:    Nausea and vomiting (7/15/2018)      Vomiting (7/17/2018)        Plan:     1. Continue treatment of painful crises. 2.  Chest x-ray pending. Continue antibiotics for hospital-acquired pneumonia.

## 2018-07-25 NOTE — PROGRESS NOTES
Spiritual Care Assessment/Progress Note  [facility]      NAME: Ricardo Trimble      MRN: [id number]  AGE: 56 y.o.  SEX: female  Pentecostalism Affiliation: Non Orthodox   Language: English     7/25/2018     Total Time (in minutes): 7     Spiritual Assessment begun in [facility] 3 MED TELE through conversation with:         [x]Patient        [] Family    [x] Friend(s)        Reason for Consult: Initial/Spiritual assessment, patient floor     Spiritual beliefs: (Please include comment if needed)     [x] Identifies with a zay tradition:         [x] Supported by a zay community:            [] Claims no spiritual orientation:           [] Seeking spiritual identity:                [] Adheres to an individual form of spirituality:           [] Not able to assess:                           Identified resources for coping:      [] Prayer                               [] Music                  [] Guided Imagery     [x] Family/friends                 [] Pet visits     [x] Devotional reading                         [] Unknown     [] Other:                                              Interventions offered during this visit: (See comments for more details)    Patient Interventions: Coping skills reviewed/reinforced, Affirmation of emotions/emotional suffering, Initial/Spiritual assessment, patient floor, Iconic (affirming the presence of God/Higher Power)           Plan of Care:     [x] Support spiritual and/or cultural needs    [] Support AMD and/or advance care planning process      [] Support grieving process   [] Coordinate Rites and/or Rituals    [] Coordination with community clergy   [] No spiritual needs identified at this time   [] Detailed Plan of Care below (See Comments)  [] Make referral to Music Therapy  [] Make referral to Pet Therapy     [] Make referral to Addiction services  [] Make referral to [facility]  [] Make referral to Spiritual Care Partner  [] No future visits requested [] Follow up visits as needed   Initial Spiritual Assessment in [phone] with LOS pt. Pt sitting up in bed accompanied by a visitor who identified as a friend from [employer]. Pt displayed a positive affect and self-reported to be coping well despite lengthy stay. Provided brief history of events leading to prolonged stay adding that she is feeling better. Celebrated with pt and extended blessing to pt and friend commending friend for her care and time. Advised of availability. Was asked to provide a copy of [employer] which  will bring up later today. No other specific needs expressed at this time. CATALINO Zambrano. Div

## 2018-07-25 NOTE — PROGRESS NOTES
Bedside shift change report given to Bhumi (oncoming nurse) by Blanca Singletary (offgoing nurse). Report included the following information SBAR, Kardex, Intake/Output, MAR and Recent Results. Zone Phone for oncoming shift:   [phone]    Shift Summary: Pt rested through night. Issues with pain, partly controlled by medication. LDAs                                   Intake & Output   Date 07/24/18 0700 - 07/25/18 0659 07/25/18 0700 - 07/26/18 0659   Shift [id number] 1900-0659 24 Hour Total 0893-7095 9590-7498 24 Hour Total   I  N  T  A  K  E   P.O. 6800 633 7993         P. O. 9008 194 3443       I.V.  (mL/kg/hr) 500  (0.5) 2150  (2.1) 2650  (1.3)         I.V.  1250 1250         Volume (dextrose 5% - 0.45% NaCl with KCl 20 mEq/L infusion)  900 900         Volume (vancomycin (VANCOCIN) 1500 mg in  ml infusion) 500  500       Shift Total  (mL/kg) 1580  (18.4) 2510  (29.3) 4090  (47.7)      O  U  T  P  U  T   Urine  (mL/kg/hr)            Urine Occurrence(s) 4 x 3 x 7 x       Shift Total  (mL/kg)         NET 1580 2510 4090      Weight (kg) 85.7 85.7 85.7 85.7 85.7 85.7      Last Bowel Movement Last Bowel Movement Date: 07/19/18   Glucose Checks [x] N/A  [] AC/HS  [] Q6  Concerns:   Nutrition Active Orders   Diet    DIET REGULAR       Consults []PT  []OT  []Speech  [x]Case Management   Cardiac Monitoring [x]N/A []Yes Expires:

## 2018-07-25 NOTE — PROGRESS NOTES
General Daily Progress Note    Admit Date: 7/15/2018    Subjective:     Patient continues to complain of pain but improving. Current Facility-Administered Medications   Medication Dose Route Frequency    [START ON 7/25/2018] Vancomycin Trough  1 Each Other ONCE    [START ON 7/25/2018] enoxaparin (LOVENOX) injection 40 mg  40 mg SubCUTAneous Q24H    sodium chloride (NS) flush 10-40 mL  10-40 mL IntraVENous PRN    sodium chloride (NS) flush 10 mL  10 mL IntraVENous Q8H    magnesium hydroxide (MILK OF MAGNESIA) 400 mg/5 mL oral suspension 30 mL  30 mL Oral DAILY PRN    vancomycin (VANCOCIN) 1500 mg in  ml infusion  1,500 mg IntraVENous Q12H    dextrose 5% - 0.45% NaCl with KCl 20 mEq/L infusion  75 mL/hr IntraVENous CONTINUOUS    cefTRIAXone (ROCEPHIN) 1 g in 0.9% sodium chloride (MBP/ADV) 50 mL  1 g IntraVENous Q24H    acetaminophen (TYLENOL) tablet 650 mg  650 mg Oral Q4H PRN    HYDROmorphone (PF) (DILAUDID) injection 1 mg  1 mg IntraVENous Q8H PRN    diphenhydrAMINE (BENADRYL) injection 12.5 mg  12.5 mg IntraVENous Q4H PRN    fentaNYL (DURAGESIC) 75 mcg/hr patch 1 Patch  1 Patch TransDERmal Q72H    citalopram (CELEXA) tablet 10 mg  10 mg Oral QPM    folic acid (FOLVITE) tablet 1 mg  1 mg Oral DAILY    sodium chloride (NS) flush 5-10 mL  5-10 mL IntraVENous Q8H    ondansetron (ZOFRAN) injection 4 mg  4 mg IntraVENous Q6H PRN    promethazine (PHENERGAN) 12.5 mg in 0.9% sodium chloride 50 mL IVPB  12.5 mg IntraVENous Q4H PRN    HYDROcodone-acetaminophen (NORCO)  mg tablet 1 Tab  1 Tab Oral Q4H PRN        Review of Systems  A comprehensive review of systems was negative.     Objective:     Patient Vitals for the past 24 hrs:   BP Temp Pulse Resp SpO2   07/24/18 1651 125/75 98.9 °F (37.2 °C) 91 20 100 %   07/24/18 0737 123/71 98.5 °F (36.9 °C) 88 18 97 %   07/24/18 0014 106/58 97.2 °F (36.2 °C) 95 18 97 %   07/23/18 2332 122/69 98.5 °F (36.9 °C) 88 16 100 %     07/24 1901 - 07/25 0700  In: 900 [I.V.:900]  Out: -   07/23 0701 - 07/24 1900  In: 5472 [P.O.:2040; I.V.:500]  Out: -     Physical Exam:   Visit Vitals    /75 (BP 1 Location: Left arm, BP Patient Position: At rest)    Pulse 91    Temp 98.9 °F (37.2 °C)    Resp 20    Ht 5' 11\" (1.803 m)    Wt 188 lb 15 oz (85.7 kg)    SpO2 100%    BMI 26.35 kg/m2     General appearance: alert, cooperative, no distress, appears stated age  Neck: supple, symmetrical, trachea midline, no adenopathy, thyroid: not enlarged, symmetric, no tenderness/mass/nodules, no carotid bruit and no JVD  Lungs: clear to auscultation bilaterally  Heart: regular rate and rhythm, S1, S2 normal, no murmur, click, rub or gallop  Abdomen: soft, non-tender.  Bowel sounds normal. No masses,  no organomegaly  Extremities: extremities normal, atraumatic, no cyanosis or edema        Data Review   Recent Results (from the past 24 hour(s))   METABOLIC PANEL, BASIC    Collection Time: 07/24/18  5:48 AM   Result Value Ref Range    Sodium 138 136 - 145 mmol/L    Potassium 4.3 3.5 - 5.1 mmol/L    Chloride 107 97 - 108 mmol/L    CO2 27 21 - 32 mmol/L    Anion gap 4 (L) 5 - 15 mmol/L    Glucose 79 65 - 100 mg/dL    BUN 7 6 - 20 MG/DL    Creatinine 0.73 0.55 - 1.02 MG/DL    BUN/Creatinine ratio 10 (L) 12 - 20      GFR est AA >60 >60 ml/min/1.73m2    GFR est non-AA >60 >60 ml/min/1.73m2    Calcium 8.5 8.5 - 10.1 MG/DL   CBC WITH MANUAL DIFF    Collection Time: 07/24/18  5:48 AM   Result Value Ref Range    WBC 7.6 3.6 - 11.0 K/uL    RBC 1.92 (L) 3.80 - 5.20 M/uL    HGB 6.3 (L) 11.5 - 16.0 g/dL    HCT 18.2 (L) 35.0 - 47.0 %    MCV 94.8 80.0 - 99.0 FL    MCH 32.8 26.0 - 34.0 PG    MCHC 34.6 30.0 - 36.5 g/dL    RDW 16.1 (H) 11.5 - 14.5 %    PLATELET 797 390 - 525 K/uL    MPV 11.7 8.9 - 12.9 FL    NRBC 0.3 (H) 0  WBC    ABSOLUTE NRBC 0.02 (H) 0.00 - 0.01 K/uL    NEUTROPHILS 28 (L) 32 - 75 %    BAND NEUTROPHILS 0 0 - 6 %    LYMPHOCYTES 47 12 - 49 %    MONOCYTES 17 (H) 5 - 13 % EOSINOPHILS 6 0 - 7 %    BASOPHILS 1 0 - 1 %    METAMYELOCYTES 1 (H) 0 %    MYELOCYTES 0 0 %    PROMYELOCYTES 0 0 %    BLASTS 0 0 %    OTHER CELL 0 0      IMMATURE GRANULOCYTES 0 %    ABS. NEUTROPHILS 2.1 1.8 - 8.0 K/UL    ABS. LYMPHOCYTES 3.6 (H) 0.8 - 3.5 K/UL    ABS. MONOCYTES 1.3 (H) 0.0 - 1.0 K/UL    ABS. EOSINOPHILS 0.5 (H) 0.0 - 0.4 K/UL    ABS. BASOPHILS 0.1 0.0 - 0.1 K/UL    ABS. IMM. GRANS. 0.0 K/UL    DF MANUAL      RBC COMMENTS ANISOCYTOSIS  1+        RBC COMMENTS SICKLE CELLS  1+        RBC COMMENTS TEARDROP CELLS  PRESENT        RBC COMMENTS TARGET CELLS  3+       GLUCOSE, POC    Collection Time: 07/24/18  7:39 AM   Result Value Ref Range    Glucose (POC) 98 65 - 100 mg/dL    Performed by Delorise Police (PCT)            Assessment:     Principal Problem:    Intractable nausea and vomiting (7/15/2018)    Active Problems:    Nausea and vomiting (7/15/2018)      Vomiting (7/17/2018)        Plan:     1. Continue treatment of pneumonia and will check chest x-ray tomorrow. 2.  Painful crises continues to improve. 3.  Hydration adequate.

## 2018-07-26 LAB
ANION GAP SERPL CALC-SCNC: 3 MMOL/L (ref 5–15)
BUN SERPL-MCNC: 5 MG/DL (ref 6–20)
BUN/CREAT SERPL: 6 (ref 12–20)
CALCIUM SERPL-MCNC: 8.4 MG/DL (ref 8.5–10.1)
CHLORIDE SERPL-SCNC: 105 MMOL/L (ref 97–108)
CO2 SERPL-SCNC: 30 MMOL/L (ref 21–32)
CREAT SERPL-MCNC: 0.78 MG/DL (ref 0.55–1.02)
GLUCOSE SERPL-MCNC: 118 MG/DL (ref 65–100)
POTASSIUM SERPL-SCNC: 3.7 MMOL/L (ref 3.5–5.1)
SODIUM SERPL-SCNC: 138 MMOL/L (ref 136–145)

## 2018-07-26 PROCEDURE — 74011250637 HC RX REV CODE- 250/637: Performed by: INTERNAL MEDICINE

## 2018-07-26 PROCEDURE — 74011250637 HC RX REV CODE- 250/637: Performed by: HOSPITALIST

## 2018-07-26 PROCEDURE — 36415 COLL VENOUS BLD VENIPUNCTURE: CPT | Performed by: INTERNAL MEDICINE

## 2018-07-26 PROCEDURE — 65270000029 HC RM PRIVATE

## 2018-07-26 PROCEDURE — 74011250636 HC RX REV CODE- 250/636: Performed by: HOSPITALIST

## 2018-07-26 PROCEDURE — 74011250636 HC RX REV CODE- 250/636: Performed by: INTERNAL MEDICINE

## 2018-07-26 PROCEDURE — 80048 BASIC METABOLIC PNL TOTAL CA: CPT | Performed by: INTERNAL MEDICINE

## 2018-07-26 RX ORDER — LEVOFLOXACIN 750 MG/1
750 TABLET ORAL EVERY 24 HOURS
Status: DISCONTINUED | OUTPATIENT
Start: 2018-07-26 | End: 2018-07-27 | Stop reason: HOSPADM

## 2018-07-26 RX ORDER — HYDROMORPHONE HYDROCHLORIDE 2 MG/ML
1 INJECTION, SOLUTION INTRAMUSCULAR; INTRAVENOUS; SUBCUTANEOUS
Status: COMPLETED | OUTPATIENT
Start: 2018-07-26 | End: 2018-07-27

## 2018-07-26 RX ORDER — LEVOFLOXACIN 500 MG/1
500 TABLET, FILM COATED ORAL EVERY 24 HOURS
Status: DISCONTINUED | OUTPATIENT
Start: 2018-07-26 | End: 2018-07-26 | Stop reason: DRUGHIGH

## 2018-07-26 RX ORDER — DEXTROSE, SODIUM CHLORIDE, AND POTASSIUM CHLORIDE 5; .45; .15 G/100ML; G/100ML; G/100ML
75 INJECTION INTRAVENOUS CONTINUOUS
Status: DISPENSED | OUTPATIENT
Start: 2018-07-26 | End: 2018-07-26

## 2018-07-26 RX ADMIN — HYDROCODONE BITARTRATE AND ACETAMINOPHEN 1 TABLET: 10; 325 TABLET ORAL at 07:37

## 2018-07-26 RX ADMIN — HYDROMORPHONE HYDROCHLORIDE 1 MG: 2 INJECTION, SOLUTION INTRAMUSCULAR; INTRAVENOUS; SUBCUTANEOUS at 07:35

## 2018-07-26 RX ADMIN — HYDROCODONE BITARTRATE AND ACETAMINOPHEN 1 TABLET: 10; 325 TABLET ORAL at 22:32

## 2018-07-26 RX ADMIN — FOLIC ACID 1 MG: 1 TABLET ORAL at 09:01

## 2018-07-26 RX ADMIN — ONDANSETRON 4 MG: 2 INJECTION INTRAMUSCULAR; INTRAVENOUS at 07:36

## 2018-07-26 RX ADMIN — DIPHENHYDRAMINE HYDROCHLORIDE 12.5 MG: 50 INJECTION, SOLUTION INTRAMUSCULAR; INTRAVENOUS at 22:32

## 2018-07-26 RX ADMIN — Medication 10 ML: at 22:05

## 2018-07-26 RX ADMIN — ONDANSETRON 4 MG: 2 INJECTION INTRAMUSCULAR; INTRAVENOUS at 15:36

## 2018-07-26 RX ADMIN — DIPHENHYDRAMINE HYDROCHLORIDE 12.5 MG: 50 INJECTION, SOLUTION INTRAMUSCULAR; INTRAVENOUS at 02:49

## 2018-07-26 RX ADMIN — LEVOFLOXACIN 750 MG: 750 TABLET, FILM COATED ORAL at 11:11

## 2018-07-26 RX ADMIN — Medication 1 CAPSULE: at 09:01

## 2018-07-26 RX ADMIN — DIPHENHYDRAMINE HYDROCHLORIDE 12.5 MG: 50 INJECTION, SOLUTION INTRAMUSCULAR; INTRAVENOUS at 07:35

## 2018-07-26 RX ADMIN — VANCOMYCIN HYDROCHLORIDE 1500 MG: 10 INJECTION, POWDER, LYOPHILIZED, FOR SOLUTION INTRAVENOUS at 09:02

## 2018-07-26 RX ADMIN — Medication 10 ML: at 11:52

## 2018-07-26 RX ADMIN — HYDROCODONE BITARTRATE AND ACETAMINOPHEN 1 TABLET: 10; 325 TABLET ORAL at 17:32

## 2018-07-26 RX ADMIN — DEXTROSE MONOHYDRATE, SODIUM CHLORIDE, AND POTASSIUM CHLORIDE 75 ML/HR: 50; 4.5; 1.49 INJECTION, SOLUTION INTRAVENOUS at 14:08

## 2018-07-26 RX ADMIN — HYDROCODONE BITARTRATE AND ACETAMINOPHEN 1 TABLET: 10; 325 TABLET ORAL at 11:51

## 2018-07-26 RX ADMIN — Medication 10 ML: at 15:36

## 2018-07-26 RX ADMIN — DIPHENHYDRAMINE HYDROCHLORIDE 12.5 MG: 50 INJECTION, SOLUTION INTRAMUSCULAR; INTRAVENOUS at 17:34

## 2018-07-26 RX ADMIN — ENOXAPARIN SODIUM 40 MG: 100 INJECTION SUBCUTANEOUS at 11:12

## 2018-07-26 RX ADMIN — DEXTROSE MONOHYDRATE, SODIUM CHLORIDE, AND POTASSIUM CHLORIDE 75 ML/HR: 50; 4.5; 1.49 INJECTION, SOLUTION INTRAVENOUS at 11:13

## 2018-07-26 RX ADMIN — CITALOPRAM HYDROBROMIDE 10 MG: 20 TABLET ORAL at 17:32

## 2018-07-26 RX ADMIN — DIPHENHYDRAMINE HYDROCHLORIDE 12.5 MG: 50 INJECTION, SOLUTION INTRAMUSCULAR; INTRAVENOUS at 11:51

## 2018-07-26 RX ADMIN — Medication 10 ML: at 05:27

## 2018-07-26 RX ADMIN — HYDROCODONE BITARTRATE AND ACETAMINOPHEN 1 TABLET: 10; 325 TABLET ORAL at 02:49

## 2018-07-26 RX ADMIN — Medication 10 ML: at 07:37

## 2018-07-26 NOTE — PROGRESS NOTES
Received call from lab about vancomycin trough critical result-22.1.  Spoke to pharmacist Duran Kendallsing that after calculating ,result considered normal.

## 2018-07-26 NOTE — PROGRESS NOTES
Pharmacy Automatic Renal Dosing Protocol - Antimicrobials    Indication for Antimicrobials: HCAP    Current Regimen of Each Antimicrobial:  Levofloxacin 500 mg q24h ( Day 1 of 7)    Previous Antimicrobial Therapy:  Ceftriaxone 1 gm IV every 24 hours (Started 18 -   Vancomycin 1250 mg IV every 12 hours (Started 18 -         Vancomycin Goal Trough: ~15 mcg/mL    Vancomycin Level Assessment:  Date Dose & Interval Measured (mcg/mL) Steady State (mcg/mL)   18 1250 Q12H  1500 Q12H 14.6  22.1 11.73  18.1       Significant Cultures:   18 Blood culture = NG x 4 d- Prelim    Labs:  Recent Labs      18   0253  18   0217  18   0548   CREA  0.78  0.79  0.73   BUN  5*  7  7   WBC   --    --   7.6   Temp (24hrs), Av.6 °F (37 °C), Min:98.6 °F (37 °C), Max:98.6 °F (37 °C)    Creatinine Clearance (mL/min) or Dialysis: Greater than 60 mL/min    Impression/Plan:   - Pneumonia resolved, abx switched to PO LQ Will adjust dose to 750 mg q24h for HCAP - is on celexa also - watch   Sickle cell anemia - POA is on dilaudid + Fentanyl patch for pain   - Plans to discharge Friday if all goes well   - Antimicrobial stop date: 7 days      Pharmacy will follow daily and adjust medications as appropriate for renal function and/or serum levels.     Thank you,  Javi Randle, [facility]

## 2018-07-26 NOTE — PROGRESS NOTES
Bedside shift change report given to Ang Mayer RN (oncoming nurse) by Araseli Chavez RN (offgoing nurse). Report included the following information SBAR, Kardex, Intake/Output, MAR and Recent Results.

## 2018-07-26 NOTE — PROGRESS NOTES
Bedside and Verbal shift change report given to Bailey Mixon RN (oncoming nurse) by Miya Gruber RN (offgoing nurse). Report included the following information SBAR, Kardex, Procedure Summary, Intake/Output, MAR and Recent Results.

## 2018-07-26 NOTE — PROGRESS NOTES
Bedside shift change report given to Mitra Mayo (oncoming nurse) by Samantha Zambrano (offgoing nurse). Report included the following information SBAR, Kardex, Intake/Output, MAR and Recent Results.

## 2018-07-26 NOTE — PROGRESS NOTES
Nutrition Assessment:    INTERVENTIONS/RECOMMENDATIONS:   Meals/Snacks: General/healthful diet: Continue Regular diet    ASSESSMENT:   Chart reviewed; patient continues on a regular diet. Consistently eating % of meals. Weight stable. Patient sleeping at time of visit; didn't wake to knock on door. Continue current nutrition plan of care. Diet Order: Regular  % Eaten:  Patient Vitals for the past 72 hrs:   % Diet Eaten   07/25/18 1830 100 %   07/25/18 1223 100 %   07/25/18 0811 100 %   07/24/18 1821 75 %   07/24/18 1417 75 %   07/24/18 0930 100 %   07/23/18 1613 100 %   07/23/18 1348 100 %     Pertinent Medications: [x] Reviewed []Other:  Pertinent Labs: [x]Reviewed  []Other:  Food Allergies: [x]None []Other:     Last BM: 7/23   [x]Active     []Hyperactive  []Hypoactive       [] Absent  BS  Skin:    [x] Intact   [] Incision  [] Breakdown   []Edema   []Other:    Anthropometrics: Height: 5' 11\" (180.3 cm) Weight: 85.7 kg (188 lb 15 oz)    IBW (%IBW):   ( ) UBW (%UBW):   (  %)    BMI: Body mass index is 26.35 kg/(m^2). This BMI is indicative of:  []Underweight   []Normal   [x]Overweight   [] Obesity   [] Extreme Obesity (BMI>40)  Last Weight Metrics:  Weight Loss Metrics 7/22/2018 7/9/2018 6/12/2018 4/9/2018 3/13/2018 1/29/2018 1/5/2018   Today's Wt 188 lb 15 oz 191 lb 195 lb 8 oz 189 lb 8 oz 187 lb 3.2 oz 192 lb 4.8 oz 190 lb 7.6 oz   BMI 26.35 kg/m2 27.41 kg/m2 28.05 kg/m2 27.19 kg/m2 26.86 kg/m2 27.59 kg/m2 27.33 kg/m2       Estimated Nutrition Needs (Based on): 2029 Kcals/day (BMR (1561) x 1. 3AF) , 70 g (-87g (0.8-1.0 g/kg bw)) Protein  Carbohydrate:  At Least 130 g/day  Fluids: 2000 mL/day     Pt expected to meet estimated nutrient needs: [x]Yes []No    NUTRITION DIAGNOSES:   Problem:  No nutritional diagnosis at this time      Etiology: related to       Signs/Symptoms: as evidenced by        NUTRITION INTERVENTIONS:  Meals/Snacks: General/healthful diet                  GOAL:   PO intake >75% of meals next 5-7 days    NUTRITION MONITORING AND EVALUATION   Food/Nutrient Intake Outcomes:  Total energy intake  Physical Signs/Symptoms Outcomes: Weight/weight change    Previous Goal Met:   [x] Met              [] Progressing Towards Goal              [] Not Progressing Towards Goal   Previous Recommendations:   [x] Implemented          [] Not Implemented          [] Not Applicable    LEARNING NEEDS (Diet, Food/Nutrient-Drug Interaction):    [x] None Identified   [] Identified and Education Provided/Documented   [] Identified and Pt declined/was not appropriate     Cultural, Roman Catholic, OR Ethnic Dietary Needs:    [x] None Identified   [] Identified and Addressed     [x] Interdisciplinary Care Plan Reviewed/Documented    [x] Discharge Planning: Regular diet   [] Participated in Interdisciplinary Rounds    NUTRITION RISK:    [] High              [] Moderate           [x]  Low  []  Minimal/Uncompromised      Alex Bounds  Pager [phone]              Weekend Pager [phone]

## 2018-07-27 VITALS
TEMPERATURE: 98.7 F | HEART RATE: 86 BPM | SYSTOLIC BLOOD PRESSURE: 140 MMHG | DIASTOLIC BLOOD PRESSURE: 75 MMHG | RESPIRATION RATE: 18 BRPM | BODY MASS INDEX: 26.45 KG/M2 | HEIGHT: 71 IN | WEIGHT: 188.93 LBS | OXYGEN SATURATION: 97 %

## 2018-07-27 LAB
ANION GAP SERPL CALC-SCNC: 5 MMOL/L (ref 5–15)
BACTERIA SPEC CULT: NORMAL
BUN SERPL-MCNC: 5 MG/DL (ref 6–20)
BUN/CREAT SERPL: 7 (ref 12–20)
CALCIUM SERPL-MCNC: 8.7 MG/DL (ref 8.5–10.1)
CHLORIDE SERPL-SCNC: 103 MMOL/L (ref 97–108)
CO2 SERPL-SCNC: 28 MMOL/L (ref 21–32)
CREAT SERPL-MCNC: 0.75 MG/DL (ref 0.55–1.02)
GLUCOSE SERPL-MCNC: 90 MG/DL (ref 65–100)
POTASSIUM SERPL-SCNC: 4.5 MMOL/L (ref 3.5–5.1)
SERVICE CMNT-IMP: NORMAL
SODIUM SERPL-SCNC: 136 MMOL/L (ref 136–145)

## 2018-07-27 PROCEDURE — 74011250637 HC RX REV CODE- 250/637: Performed by: HOSPITALIST

## 2018-07-27 PROCEDURE — 74011250636 HC RX REV CODE- 250/636: Performed by: INTERNAL MEDICINE

## 2018-07-27 PROCEDURE — 74011250636 HC RX REV CODE- 250/636: Performed by: HOSPITALIST

## 2018-07-27 PROCEDURE — 80048 BASIC METABOLIC PNL TOTAL CA: CPT | Performed by: INTERNAL MEDICINE

## 2018-07-27 PROCEDURE — 74011250637 HC RX REV CODE- 250/637: Performed by: INTERNAL MEDICINE

## 2018-07-27 PROCEDURE — 36415 COLL VENOUS BLD VENIPUNCTURE: CPT | Performed by: INTERNAL MEDICINE

## 2018-07-27 RX ORDER — HYDROCODONE BITARTRATE AND ACETAMINOPHEN 10; 325 MG/1; MG/1
1 TABLET ORAL
Qty: 120 TAB | Refills: 0 | Status: SHIPPED | OUTPATIENT
Start: 2018-07-27 | End: 2018-08-23 | Stop reason: SDUPTHER

## 2018-07-27 RX ADMIN — DIPHENHYDRAMINE HYDROCHLORIDE 12.5 MG: 50 INJECTION, SOLUTION INTRAMUSCULAR; INTRAVENOUS at 11:22

## 2018-07-27 RX ADMIN — LEVOFLOXACIN 750 MG: 750 TABLET, FILM COATED ORAL at 11:29

## 2018-07-27 RX ADMIN — HYDROMORPHONE HYDROCHLORIDE 1 MG: 2 INJECTION, SOLUTION INTRAMUSCULAR; INTRAVENOUS; SUBCUTANEOUS at 11:21

## 2018-07-27 RX ADMIN — Medication 10 ML: at 05:12

## 2018-07-27 RX ADMIN — Medication 1 CAPSULE: at 11:20

## 2018-07-27 RX ADMIN — ONDANSETRON 4 MG: 2 INJECTION INTRAMUSCULAR; INTRAVENOUS at 02:45

## 2018-07-27 RX ADMIN — ONDANSETRON 4 MG: 2 INJECTION INTRAMUSCULAR; INTRAVENOUS at 11:21

## 2018-07-27 RX ADMIN — HYDROMORPHONE HYDROCHLORIDE 1 MG: 2 INJECTION, SOLUTION INTRAMUSCULAR; INTRAVENOUS; SUBCUTANEOUS at 02:44

## 2018-07-27 RX ADMIN — DIPHENHYDRAMINE HYDROCHLORIDE 12.5 MG: 50 INJECTION, SOLUTION INTRAMUSCULAR; INTRAVENOUS at 02:42

## 2018-07-27 RX ADMIN — FOLIC ACID 1 MG: 1 TABLET ORAL at 11:20

## 2018-07-27 RX ADMIN — HYDROCODONE BITARTRATE AND ACETAMINOPHEN 1 TABLET: 10; 325 TABLET ORAL at 07:01

## 2018-07-27 RX ADMIN — HYDROCODONE BITARTRATE AND ACETAMINOPHEN 1 TABLET: 10; 325 TABLET ORAL at 11:20

## 2018-07-27 RX ADMIN — DIPHENHYDRAMINE HYDROCHLORIDE 12.5 MG: 50 INJECTION, SOLUTION INTRAMUSCULAR; INTRAVENOUS at 07:01

## 2018-07-27 NOTE — PROGRESS NOTES
Bedside and Verbal shift change report given to Ady Espitia (oncoming nurse) by Freddie Escobar RN (offgoing nurse). Report included the following information SBAR, Kardex, MAR and Recent Results.

## 2018-07-27 NOTE — DISCHARGE INSTRUCTIONS
General Discharge Instructions    Patient ID:  Cj Cardenas  [id number]  56 y.o.  1960    Patient Instructions          The following personal items were collected during your admission and were returned to you. Take Home Medications             What to do at Home    Recommended diet: Regular Diet    Recommended activity: Activity as tolerated    Follow-up with Morro Flores MD  in 5 days. Information obtained by :  I understand that if any problems occur once I am at home I am to contact my physician. I understand and acknowledge receipt of the instructions indicated above.                                                                                                                                            Physician's or R.N.'s Signature                                                                  Date/Time                                                                                                                                              Patient or Representative Signature                                                          Date/Time

## 2018-07-27 NOTE — DISCHARGE SUMMARY
Ady Rodgers  MR#: [id number]  : 1960  ACCOUNT #: [de-identified]   ADMIT DATE: 07/15/2018  DISCHARGE DATE:     HISTORY OF PRESENT ILLNESS:  Patient is a 42-year-old lady who was doing well up until the day of admission. She began having recurrent episodes of nausea and vomiting that started after consuming a hamburger from [employer]. This is accompanied by aching in her shoulders and back. 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 She was evaluated and subsequently admitted. PAST MEDICAL HISTORY, SOCIAL HISTORY, REVIEW OF SYSTEMS, FAMILY HISTORY, PHYSICAL EXAMINATION:  As in admitting H and P.    LABORATORY VALUES:  Initial hemoglobin was 7.1, white count 13.4, MCV was 96.3, platelet count 208, with the following differential, 45 segs, 49 lymphocytes, 4 monocytes, 1 eosinophil, 1 basophil. On , hemoglobin 6.3, white count 7.6. Abnormalities on the comprehensive profile on admission revealed a bilirubin of 2.5 and globulin 4.1. Blood cultures were negative. Chest x-ray revealed bibasilar atelectasis with focal interstitial opacity in the left lung base concerning for early developing pneumonia. Repeat chest x-ray on  was negative. HOSPITAL COURSE:  Patient was admitted and was treated with antiemetics for nausea and vomiting, felt to be probably induced by probably a viral process from the hamburger. Her GI symptoms were relatively short-lived. Unfortunately, she then developed pain in her back and shoulders felt to be related to a flareup of her sickle cell disease. She was aggressively treated and it took several days for this to improve to the point where she was comfortable going home. Unfortunately, she spiked a temperature of 102 degrees Fahrenheit on .   She was cultured and empirically started on antibiotics after chest x-ray suggested the possibility of a right lower lobe infiltrate. She continued to improve to the point where chest x-ray was negative. DISPOSITION:  She was discharged home. FINAL DIAGNOSES:  1. Probable viral gastritis. 2.  Painful sickle crises. 3.  Hospital-acquired pneumonia. 4.  Primary hypertension. 5.  History of depression. DISCHARGE MEDICATIONS:  Include the following:  Celexa 10 mg daily, cyclobenzaprine 10 mg q.i.d. p.r.n., Duragesic patch 75 mcg q.3 days, folic acid 1 mg q.a.m., hydrocodone/APAP 10/325 one q.4 hours p.r.n., losartan/hydrochlorothiazide 50/12.5 q.a.m., omeprazole 20 mg daily. DISCHARGE INSTRUCTIONS:  She will be discharged on a regular diet with unrestricted activities. Return to the office in the next 5 days or so. KAREL Matias MD       LAB/LN  D: 07/27/2018 09:34     T: 07/27/2018 10:15  JOB #: [id number]

## 2018-07-27 NOTE — PROGRESS NOTES
PCP KRISTINA appt scheduled with Dr. Mayes Found on 8/3/2018 at 9:45am. Appt added to [address] [facility]

## 2018-07-27 NOTE — PROGRESS NOTES
All in agreement with d/c to home today without d/c needs. MD declines need for HHC. Pt to have family transport by 11a if possible.

## 2018-08-23 DIAGNOSIS — D57.00 HB-SS DISEASE WITH CRISIS (HCC): ICD-10-CM

## 2018-08-24 RX ORDER — HYDROCODONE BITARTRATE AND ACETAMINOPHEN 10; 325 MG/1; MG/1
1 TABLET ORAL
Qty: 120 TAB | Refills: 0 | Status: SHIPPED | OUTPATIENT
Start: 2018-08-24 | End: 2018-09-24 | Stop reason: SDUPTHER

## 2018-09-05 DIAGNOSIS — D57.00 SICKLE CELL CRISIS (HCC): ICD-10-CM

## 2018-09-05 RX ORDER — FENTANYL 75 UG/H
1 PATCH TRANSDERMAL
Qty: 10 PATCH | Refills: 0 | Status: SHIPPED | OUTPATIENT
Start: 2018-09-05 | End: 2018-10-23 | Stop reason: SDUPTHER

## 2018-09-06 ENCOUNTER — TELEPHONE (OUTPATIENT)
Dept: INTERNAL MEDICINE CLINIC | Age: 58
End: 2018-09-06

## 2018-09-07 PROCEDURE — 99283 EMERGENCY DEPT VISIT LOW MDM: CPT

## 2018-09-08 ENCOUNTER — APPOINTMENT (OUTPATIENT)
Dept: GENERAL RADIOLOGY | Age: 58
DRG: 812 | End: 2018-09-08
Attending: INTERNAL MEDICINE
Payer: MEDICARE

## 2018-09-08 ENCOUNTER — HOSPITAL ENCOUNTER (INPATIENT)
Age: 58
LOS: 12 days | Discharge: HOME | DRG: 812 | End: 2018-09-20
Attending: EMERGENCY MEDICINE | Admitting: INTERNAL MEDICINE
Payer: MEDICARE

## 2018-09-08 DIAGNOSIS — R11.10 VOMITING, INTRACTABILITY OF VOMITING NOT SPECIFIED, PRESENCE OF NAUSEA NOT SPECIFIED, UNSPECIFIED VOMITING TYPE: ICD-10-CM

## 2018-09-08 DIAGNOSIS — D57.00 SICKLE CELL PAIN CRISIS (HCC): Primary | ICD-10-CM

## 2018-09-08 LAB
ALBUMIN SERPL-MCNC: 4.4 G/DL (ref 3.5–5)
ALBUMIN/GLOB SERPL: 0.7 {RATIO} (ref 1.1–2.2)
ALP SERPL-CCNC: 117 U/L (ref 45–117)
ALT SERPL-CCNC: 16 U/L (ref 12–78)
ANION GAP SERPL CALC-SCNC: 10 MMOL/L (ref 5–15)
ANION GAP SERPL CALC-SCNC: 13 MMOL/L (ref 5–15)
APPEARANCE UR: CLEAR
AST SERPL-CCNC: 29 U/L (ref 15–37)
BACTERIA URNS QL MICRO: ABNORMAL /HPF
BASOPHILS # BLD: 0 K/UL (ref 0–0.1)
BASOPHILS # BLD: 0 K/UL (ref 0–0.1)
BASOPHILS NFR BLD: 0 % (ref 0–1)
BASOPHILS NFR BLD: 0 % (ref 0–1)
BILIRUB SERPL-MCNC: 2.5 MG/DL (ref 0.2–1)
BILIRUB UR QL: NEGATIVE
BUN SERPL-MCNC: 4 MG/DL (ref 6–20)
BUN SERPL-MCNC: 7 MG/DL (ref 6–20)
BUN/CREAT SERPL: 6 (ref 12–20)
BUN/CREAT SERPL: 7 (ref 12–20)
CALCIUM SERPL-MCNC: 8.6 MG/DL (ref 8.5–10.1)
CALCIUM SERPL-MCNC: 9.5 MG/DL (ref 8.5–10.1)
CHLORIDE SERPL-SCNC: 102 MMOL/L (ref 97–108)
CHLORIDE SERPL-SCNC: 105 MMOL/L (ref 97–108)
CO2 SERPL-SCNC: 21 MMOL/L (ref 21–32)
CO2 SERPL-SCNC: 23 MMOL/L (ref 21–32)
COLOR UR: YELLOW
CREAT SERPL-MCNC: 0.66 MG/DL (ref 0.55–1.02)
CREAT SERPL-MCNC: 1.06 MG/DL (ref 0.55–1.02)
DIFFERENTIAL METHOD BLD: ABNORMAL
DIFFERENTIAL METHOD BLD: ABNORMAL
EOSINOPHIL # BLD: 0 K/UL (ref 0–0.4)
EOSINOPHIL # BLD: 0.1 K/UL (ref 0–0.4)
EOSINOPHIL NFR BLD: 1 % (ref 0–7)
EOSINOPHIL NFR BLD: 1 % (ref 0–7)
EPITH CASTS URNS QL MICRO: ABNORMAL /LPF
ERYTHROCYTE [DISTWIDTH] IN BLOOD BY AUTOMATED COUNT: 16 % (ref 11.5–14.5)
ERYTHROCYTE [DISTWIDTH] IN BLOOD BY AUTOMATED COUNT: 16 % (ref 11.5–14.5)
GLOBULIN SER CALC-MCNC: 6 G/DL (ref 2–4)
GLUCOSE SERPL-MCNC: 142 MG/DL (ref 65–100)
GLUCOSE SERPL-MCNC: 181 MG/DL (ref 65–100)
GLUCOSE UR STRIP.AUTO-MCNC: NEGATIVE MG/DL
HCT VFR BLD AUTO: 23.7 % (ref 35–47)
HCT VFR BLD AUTO: 26.3 % (ref 35–47)
HGB BLD-MCNC: 8.4 G/DL (ref 11.5–16)
HGB BLD-MCNC: 9.4 G/DL (ref 11.5–16)
HGB UR QL STRIP: ABNORMAL
IMM GRANULOCYTES # BLD: 0 K/UL (ref 0–0.04)
IMM GRANULOCYTES # BLD: 0.1 K/UL (ref 0–0.04)
IMM GRANULOCYTES NFR BLD AUTO: 0 % (ref 0–0.5)
IMM GRANULOCYTES NFR BLD AUTO: 1 % (ref 0–0.5)
KETONES UR QL STRIP.AUTO: ABNORMAL MG/DL
LDH SERPL L TO P-CCNC: 281 U/L (ref 81–246)
LEUKOCYTE ESTERASE UR QL STRIP.AUTO: NEGATIVE
LIPASE SERPL-CCNC: 338 U/L (ref 73–393)
LYMPHOCYTES # BLD: 0.8 K/UL (ref 0.8–3.5)
LYMPHOCYTES # BLD: 0.9 K/UL (ref 0.8–3.5)
LYMPHOCYTES NFR BLD: 10 % (ref 12–49)
LYMPHOCYTES NFR BLD: 15 % (ref 12–49)
MCH RBC QN AUTO: 33.5 PG (ref 26–34)
MCH RBC QN AUTO: 33.7 PG (ref 26–34)
MCHC RBC AUTO-ENTMCNC: 35.4 G/DL (ref 30–36.5)
MCHC RBC AUTO-ENTMCNC: 35.7 G/DL (ref 30–36.5)
MCV RBC AUTO: 94.3 FL (ref 80–99)
MCV RBC AUTO: 94.4 FL (ref 80–99)
MONOCYTES # BLD: 0.3 K/UL (ref 0–1)
MONOCYTES # BLD: 0.4 K/UL (ref 0–1)
MONOCYTES NFR BLD: 4 % (ref 5–13)
MONOCYTES NFR BLD: 7 % (ref 5–13)
NEUTS SEG # BLD: 4.7 K/UL (ref 1.8–8)
NEUTS SEG # BLD: 7.4 K/UL (ref 1.8–8)
NEUTS SEG NFR BLD: 77 % (ref 32–75)
NEUTS SEG NFR BLD: 85 % (ref 32–75)
NITRITE UR QL STRIP.AUTO: NEGATIVE
NRBC # BLD: 0.12 K/UL (ref 0–0.01)
NRBC # BLD: 0.17 K/UL (ref 0–0.01)
NRBC BLD-RTO: 1.4 PER 100 WBC
NRBC BLD-RTO: 2.8 PER 100 WBC
PH UR STRIP: 6.5 [PH] (ref 5–8)
PLATELET # BLD AUTO: 366 K/UL (ref 150–400)
PLATELET # BLD AUTO: 424 K/UL (ref 150–400)
PMV BLD AUTO: 11.1 FL (ref 8.9–12.9)
PMV BLD AUTO: 11.1 FL (ref 8.9–12.9)
POTASSIUM SERPL-SCNC: 3 MMOL/L (ref 3.5–5.1)
POTASSIUM SERPL-SCNC: 3.1 MMOL/L (ref 3.5–5.1)
PROT SERPL-MCNC: 10.4 G/DL (ref 6.4–8.2)
PROT UR STRIP-MCNC: ABNORMAL MG/DL
RBC # BLD AUTO: 2.51 M/UL (ref 3.8–5.2)
RBC # BLD AUTO: 2.79 M/UL (ref 3.8–5.2)
RBC #/AREA URNS HPF: ABNORMAL /HPF (ref 0–5)
RETICS # AUTO: 0.11 M/UL (ref 0.02–0.08)
RETICS/RBC NFR AUTO: 4 % (ref 0.7–2.1)
SODIUM SERPL-SCNC: 136 MMOL/L (ref 136–145)
SODIUM SERPL-SCNC: 138 MMOL/L (ref 136–145)
SP GR UR REFRACTOMETRY: 1.01 (ref 1–1.03)
UROBILINOGEN UR QL STRIP.AUTO: 0.1 EU/DL (ref 0.2–1)
WBC # BLD AUTO: 6.1 K/UL (ref 3.6–11)
WBC # BLD AUTO: 8.7 K/UL (ref 3.6–11)
WBC URNS QL MICRO: ABNORMAL /HPF (ref 0–4)

## 2018-09-08 PROCEDURE — 81001 URINALYSIS AUTO W/SCOPE: CPT | Performed by: INTERNAL MEDICINE

## 2018-09-08 PROCEDURE — 74011250636 HC RX REV CODE- 250/636: Performed by: INTERNAL MEDICINE

## 2018-09-08 PROCEDURE — 94760 N-INVAS EAR/PLS OXIMETRY 1: CPT

## 2018-09-08 PROCEDURE — 96361 HYDRATE IV INFUSION ADD-ON: CPT

## 2018-09-08 PROCEDURE — 65270000029 HC RM PRIVATE

## 2018-09-08 PROCEDURE — 71045 X-RAY EXAM CHEST 1 VIEW: CPT

## 2018-09-08 PROCEDURE — 96374 THER/PROPH/DIAG INJ IV PUSH: CPT

## 2018-09-08 PROCEDURE — 74011000258 HC RX REV CODE- 258: Performed by: EMERGENCY MEDICINE

## 2018-09-08 PROCEDURE — 96376 TX/PRO/DX INJ SAME DRUG ADON: CPT

## 2018-09-08 PROCEDURE — 74011000250 HC RX REV CODE- 250: Performed by: INTERNAL MEDICINE

## 2018-09-08 PROCEDURE — 80053 COMPREHEN METABOLIC PANEL: CPT | Performed by: EMERGENCY MEDICINE

## 2018-09-08 PROCEDURE — 85045 AUTOMATED RETICULOCYTE COUNT: CPT | Performed by: EMERGENCY MEDICINE

## 2018-09-08 PROCEDURE — 77010033678 HC OXYGEN DAILY

## 2018-09-08 PROCEDURE — 83690 ASSAY OF LIPASE: CPT | Performed by: EMERGENCY MEDICINE

## 2018-09-08 PROCEDURE — 85025 COMPLETE CBC W/AUTO DIFF WBC: CPT | Performed by: EMERGENCY MEDICINE

## 2018-09-08 PROCEDURE — 36415 COLL VENOUS BLD VENIPUNCTURE: CPT | Performed by: INTERNAL MEDICINE

## 2018-09-08 PROCEDURE — 74011250636 HC RX REV CODE- 250/636: Performed by: EMERGENCY MEDICINE

## 2018-09-08 PROCEDURE — 96375 TX/PRO/DX INJ NEW DRUG ADDON: CPT

## 2018-09-08 PROCEDURE — 83615 LACTATE (LD) (LDH) ENZYME: CPT | Performed by: EMERGENCY MEDICINE

## 2018-09-08 RX ORDER — HYDROMORPHONE HYDROCHLORIDE 2 MG/ML
1 INJECTION, SOLUTION INTRAMUSCULAR; INTRAVENOUS; SUBCUTANEOUS
Status: DISCONTINUED | OUTPATIENT
Start: 2018-09-08 | End: 2018-09-08

## 2018-09-08 RX ORDER — FACIAL-BODY WIPES
10 EACH TOPICAL DAILY PRN
Status: DISCONTINUED | OUTPATIENT
Start: 2018-09-08 | End: 2018-09-20 | Stop reason: HOSPADM

## 2018-09-08 RX ORDER — HYDROMORPHONE HYDROCHLORIDE 2 MG/ML
1 INJECTION, SOLUTION INTRAMUSCULAR; INTRAVENOUS; SUBCUTANEOUS
Status: DISCONTINUED | OUTPATIENT
Start: 2018-09-08 | End: 2018-09-18

## 2018-09-08 RX ORDER — SODIUM CHLORIDE 0.9 % (FLUSH) 0.9 %
5-10 SYRINGE (ML) INJECTION AS NEEDED
Status: DISCONTINUED | OUTPATIENT
Start: 2018-09-08 | End: 2018-09-20 | Stop reason: HOSPADM

## 2018-09-08 RX ORDER — HEPARIN SODIUM 5000 [USP'U]/ML
5000 INJECTION, SOLUTION INTRAVENOUS; SUBCUTANEOUS EVERY 8 HOURS
Status: DISCONTINUED | OUTPATIENT
Start: 2018-09-08 | End: 2018-09-10

## 2018-09-08 RX ORDER — SODIUM CHLORIDE AND POTASSIUM CHLORIDE .9; .15 G/100ML; G/100ML
SOLUTION INTRAVENOUS CONTINUOUS
Status: DISCONTINUED | OUTPATIENT
Start: 2018-09-08 | End: 2018-09-10

## 2018-09-08 RX ORDER — ONDANSETRON 2 MG/ML
4 INJECTION INTRAMUSCULAR; INTRAVENOUS
Status: COMPLETED | OUTPATIENT
Start: 2018-09-08 | End: 2018-09-08

## 2018-09-08 RX ORDER — DIPHENHYDRAMINE HYDROCHLORIDE 50 MG/ML
12.5 INJECTION, SOLUTION INTRAMUSCULAR; INTRAVENOUS
Status: DISCONTINUED | OUTPATIENT
Start: 2018-09-08 | End: 2018-09-20 | Stop reason: HOSPADM

## 2018-09-08 RX ORDER — HYDROMORPHONE HYDROCHLORIDE 1 MG/ML
1 INJECTION, SOLUTION INTRAMUSCULAR; INTRAVENOUS; SUBCUTANEOUS
Status: COMPLETED | OUTPATIENT
Start: 2018-09-08 | End: 2018-09-08

## 2018-09-08 RX ORDER — HYDROMORPHONE HYDROCHLORIDE 1 MG/ML
1 INJECTION, SOLUTION INTRAMUSCULAR; INTRAVENOUS; SUBCUTANEOUS ONCE
Status: COMPLETED | OUTPATIENT
Start: 2018-09-08 | End: 2018-09-08

## 2018-09-08 RX ORDER — NALOXONE HYDROCHLORIDE 0.4 MG/ML
0.4 INJECTION, SOLUTION INTRAMUSCULAR; INTRAVENOUS; SUBCUTANEOUS AS NEEDED
Status: DISCONTINUED | OUTPATIENT
Start: 2018-09-08 | End: 2018-09-20 | Stop reason: HOSPADM

## 2018-09-08 RX ORDER — SODIUM CHLORIDE 9 MG/ML
1000 INJECTION, SOLUTION INTRAVENOUS ONCE
Status: COMPLETED | OUTPATIENT
Start: 2018-09-08 | End: 2018-09-08

## 2018-09-08 RX ORDER — SODIUM CHLORIDE 0.9 % (FLUSH) 0.9 %
5-10 SYRINGE (ML) INJECTION EVERY 8 HOURS
Status: DISCONTINUED | OUTPATIENT
Start: 2018-09-08 | End: 2018-09-20 | Stop reason: HOSPADM

## 2018-09-08 RX ORDER — DIPHENHYDRAMINE HYDROCHLORIDE 50 MG/ML
25 INJECTION, SOLUTION INTRAMUSCULAR; INTRAVENOUS
Status: COMPLETED | OUTPATIENT
Start: 2018-09-08 | End: 2018-09-08

## 2018-09-08 RX ORDER — ONDANSETRON 2 MG/ML
4 INJECTION INTRAMUSCULAR; INTRAVENOUS
Status: DISCONTINUED | OUTPATIENT
Start: 2018-09-08 | End: 2018-09-20 | Stop reason: HOSPADM

## 2018-09-08 RX ADMIN — HYDROMORPHONE HYDROCHLORIDE 1 MG: 2 INJECTION, SOLUTION INTRAMUSCULAR; INTRAVENOUS; SUBCUTANEOUS at 10:49

## 2018-09-08 RX ADMIN — HEPARIN SODIUM 5000 UNITS: 5000 INJECTION INTRAVENOUS; SUBCUTANEOUS at 14:59

## 2018-09-08 RX ADMIN — HYDROMORPHONE HYDROCHLORIDE 1 MG: 2 INJECTION, SOLUTION INTRAMUSCULAR; INTRAVENOUS; SUBCUTANEOUS at 14:59

## 2018-09-08 RX ADMIN — SODIUM CHLORIDE AND POTASSIUM CHLORIDE: 9; 1.49 INJECTION, SOLUTION INTRAVENOUS at 15:01

## 2018-09-08 RX ADMIN — HYDROMORPHONE HYDROCHLORIDE 1 MG: 2 INJECTION, SOLUTION INTRAMUSCULAR; INTRAVENOUS; SUBCUTANEOUS at 21:18

## 2018-09-08 RX ADMIN — ONDANSETRON 4 MG: 2 INJECTION INTRAMUSCULAR; INTRAVENOUS at 20:17

## 2018-09-08 RX ADMIN — HYDROMORPHONE HYDROCHLORIDE 1 MG: 2 INJECTION, SOLUTION INTRAMUSCULAR; INTRAVENOUS; SUBCUTANEOUS at 01:00

## 2018-09-08 RX ADMIN — SODIUM CHLORIDE 1000 ML: 900 INJECTION, SOLUTION INTRAVENOUS at 01:08

## 2018-09-08 RX ADMIN — ONDANSETRON 4 MG: 2 INJECTION INTRAMUSCULAR; INTRAVENOUS at 14:59

## 2018-09-08 RX ADMIN — PROMETHAZINE HYDROCHLORIDE 12.5 MG: 25 INJECTION, SOLUTION INTRAMUSCULAR; INTRAVENOUS at 01:21

## 2018-09-08 RX ADMIN — HEPARIN SODIUM 5000 UNITS: 5000 INJECTION INTRAVENOUS; SUBCUTANEOUS at 06:24

## 2018-09-08 RX ADMIN — Medication 10 ML: at 20:16

## 2018-09-08 RX ADMIN — HYDROMORPHONE HYDROCHLORIDE 1 MG: 2 INJECTION, SOLUTION INTRAMUSCULAR; INTRAVENOUS; SUBCUTANEOUS at 18:16

## 2018-09-08 RX ADMIN — ONDANSETRON HYDROCHLORIDE 4 MG: 2 INJECTION, SOLUTION INTRAMUSCULAR; INTRAVENOUS at 02:38

## 2018-09-08 RX ADMIN — PROCHLORPERAZINE EDISYLATE 10 MG: 5 INJECTION INTRAMUSCULAR; INTRAVENOUS at 10:49

## 2018-09-08 RX ADMIN — DIPHENHYDRAMINE HYDROCHLORIDE 12.5 MG: 50 INJECTION, SOLUTION INTRAMUSCULAR; INTRAVENOUS at 10:49

## 2018-09-08 RX ADMIN — ONDANSETRON 4 MG: 2 INJECTION INTRAMUSCULAR; INTRAVENOUS at 09:57

## 2018-09-08 RX ADMIN — HYDROMORPHONE HYDROCHLORIDE 1 MG: 1 INJECTION, SOLUTION INTRAMUSCULAR; INTRAVENOUS; SUBCUTANEOUS at 04:01

## 2018-09-08 RX ADMIN — SODIUM CHLORIDE 1000 ML: 900 INJECTION, SOLUTION INTRAVENOUS at 04:01

## 2018-09-08 RX ADMIN — DIPHENHYDRAMINE HYDROCHLORIDE 12.5 MG: 50 INJECTION, SOLUTION INTRAMUSCULAR; INTRAVENOUS at 15:01

## 2018-09-08 RX ADMIN — HYDROMORPHONE HYDROCHLORIDE 1 MG: 1 INJECTION, SOLUTION INTRAMUSCULAR; INTRAVENOUS; SUBCUTANEOUS at 02:58

## 2018-09-08 RX ADMIN — DIPHENHYDRAMINE HYDROCHLORIDE 25 MG: 50 INJECTION, SOLUTION INTRAMUSCULAR; INTRAVENOUS at 01:08

## 2018-09-08 RX ADMIN — DIPHENHYDRAMINE HYDROCHLORIDE 12.5 MG: 50 INJECTION, SOLUTION INTRAMUSCULAR; INTRAVENOUS at 18:15

## 2018-09-08 RX ADMIN — ONDANSETRON 4 MG: 2 INJECTION INTRAMUSCULAR; INTRAVENOUS at 06:24

## 2018-09-08 RX ADMIN — SODIUM CHLORIDE AND POTASSIUM CHLORIDE: 9; 1.49 INJECTION, SOLUTION INTRAVENOUS at 04:58

## 2018-09-08 RX ADMIN — HEPARIN SODIUM 5000 UNITS: 5000 INJECTION INTRAVENOUS; SUBCUTANEOUS at 20:17

## 2018-09-08 RX ADMIN — HYDROMORPHONE HYDROCHLORIDE 1 MG: 1 INJECTION, SOLUTION INTRAMUSCULAR; INTRAVENOUS; SUBCUTANEOUS at 09:57

## 2018-09-08 RX ADMIN — HYDROMORPHONE HYDROCHLORIDE 1 MG: 1 INJECTION, SOLUTION INTRAMUSCULAR; INTRAVENOUS; SUBCUTANEOUS at 01:08

## 2018-09-08 RX ADMIN — PROCHLORPERAZINE EDISYLATE 10 MG: 5 INJECTION INTRAMUSCULAR; INTRAVENOUS at 18:16

## 2018-09-08 RX ADMIN — Medication 10 ML: at 06:00

## 2018-09-08 RX ADMIN — DIPHENHYDRAMINE HYDROCHLORIDE 12.5 MG: 50 INJECTION, SOLUTION INTRAMUSCULAR; INTRAVENOUS at 21:17

## 2018-09-08 NOTE — PROGRESS NOTES
Patient Active Problem List  
Diagnosis Code  Sickle cell anemia (HCC) D57.1  Hypertension I10  Venous insufficiency I87.2  Hepatitis C B19.20  Depression F32.9  Furuncle of axilla L02.429  
 Sickle cell crisis (HCC) D57.00  Pulsatile tinnitus H93. A9  
 Carpal tunnel syndrome of right wrist G56.01  
 Intractable nausea and vomiting R11.2  Nausea and vomiting R11.2  Vomiting R11.10  Sickle cell pain crisis ([address] D57.00 Allergies Allergen Reactions  Dilaudid [Hydromorphone (Bulk)] Itching Can tolerate with benadryl and ondansetron  Percocet [Oxycodone-Acetaminophen] Itching Current Facility-Administered Medications:  
  sodium chloride (NS) flush 5-10 mL, 5-10 mL, IntraVENous, Q8H, Adrian Hyde MD, 10 mL at 09/08/18 0600 
  sodium chloride (NS) flush 5-10 mL, 5-10 mL, IntraVENous, PRN, Diego Larry MD 
  heparin (porcine) injection 5,000 Units, 5,000 Units, SubCUTAneous, Q8H, Adrian Hyde MD, 5,000 Units at 09/08/18 1455 
  naloxone [facility]) injection 0.4 mg, 0.4 mg, IntraVENous, PRN, Diego Larry MD 
  diphenhydrAMINE (BENADRYL) injection 12.5 mg, 12.5 mg, IntraVENous, Q4H PRN, Diego Larry MD 
  ondansetron [facility]) injection 4 mg, 4 mg, IntraVENous, Q4H PRN, Diego Larry MD, 4 mg at 09/08/18 8343   bisacodyl (DULCOLAX) suppository 10 mg, 10 mg, Rectal, DAILY PRN, Diego Larry MD 
  0.9% sodium chloride with KCl 20 mEq/L infusion, , IntraVENous, CONTINUOUS, Adrian Hyde MD, Last Rate: 100 mL/hr at 09/08/18 0458 
  HYDROmorphone (PF) (DILAUDID) injection 1 mg, 1 mg, IntraVENous, Q3H PRN, Alicia Senior III, MD 
  prochlorperazine (COMPAZINE) with saline injection 10 mg, 10 mg, IntraVENous, Q6H PRN, Alicia Senior III, MD 
Recent Results (from the past 24 hour(s)) CBC WITH AUTOMATED DIFF Collection Time: 09/08/18 12:42 AM  
Result Value Ref Range WBC 6.1 3.6 - 11.0 K/uL  
 RBC 2.79 (L) 3.80 - 5.20 M/uL HGB 9.4 (L) 11.5 - 16.0 g/dL HCT 26.3 (L) 35.0 - 47.0 % MCV 94.3 80.0 - 99.0 FL  
 MCH 33.7 26.0 - 34.0 PG  
 MCHC 35.7 30.0 - 36.5 g/dL  
 RDW 16.0 (H) 11.5 - 14.5 % PLATELET 259 (H) 860 - 400 K/uL MPV 11.1 8.9 - 12.9 FL  
 NRBC 2.8 (H) 0  WBC ABSOLUTE NRBC 0.17 (H) 0.00 - 0.01 K/uL NEUTROPHILS 77 (H) 32 - 75 % LYMPHOCYTES 15 12 - 49 % MONOCYTES 7 5 - 13 % EOSINOPHILS 1 0 - 7 % BASOPHILS 0 0 - 1 % IMMATURE GRANULOCYTES 0 0.0 - 0.5 % ABS. NEUTROPHILS 4.7 1.8 - 8.0 K/UL  
 ABS. LYMPHOCYTES 0.9 0.8 - 3.5 K/UL  
 ABS. MONOCYTES 0.4 0.0 - 1.0 K/UL  
 ABS. EOSINOPHILS 0.0 0.0 - 0.4 K/UL  
 ABS. BASOPHILS 0.0 0.0 - 0.1 K/UL  
 ABS. IMM. GRANS. 0.0 0.00 - 0.04 K/UL  
 DF AUTOMATED    
RETICULOCYTE COUNT Collection Time: 09/08/18 12:42 AM  
Result Value Ref Range Reticulocyte count 4.0 (H) 0.7 - 2.1 % Absolute Retic Cnt. 0.1105 (H) 0.0164 - 0.0776 M/ul LD Collection Time: 09/08/18 12:42 AM  
Result Value Ref Range  (H) 81 - 022 U/L  
METABOLIC PANEL, COMPREHENSIVE Collection Time: 09/08/18 12:42 AM  
Result Value Ref Range Sodium 136 136 - 145 mmol/L Potassium 3.1 (L) 3.5 - 5.1 mmol/L Chloride 102 97 - 108 mmol/L  
 CO2 21 21 - 32 mmol/L Anion gap 13 5 - 15 mmol/L Glucose 181 (H) 65 - 100 mg/dL BUN 7 6 - 20 MG/DL Creatinine 1.06 (H) 0.55 - 1.02 MG/DL  
 BUN/Creatinine ratio 7 (L) 12 - 20 GFR est AA >60 >60 ml/min/1.73m2 GFR est non-AA 54 (L) >60 ml/min/1.73m2 Calcium 9.5 8.5 - 10.1 MG/DL Bilirubin, total 2.5 (H) 0.2 - 1.0 MG/DL  
 ALT (SGPT) 16 12 - 78 U/L  
 AST (SGOT) 29 15 - 37 U/L Alk. phosphatase 117 45 - 117 U/L Protein, total 10.4 (H) 6.4 - 8.2 g/dL Albumin 4.4 3.5 - 5.0 g/dL Globulin 6.0 (H) 2.0 - 4.0 g/dL A-G Ratio 0.7 (L) 1.1 - 2.2 LIPASE Collection Time: 09/08/18 12:42 AM  
Result Value Ref Range Lipase 338 73 - 519 U/L  
METABOLIC PANEL, BASIC  Collection Time: 09/08/18  5:45 AM  
 Result Value Ref Range Sodium 138 136 - 145 mmol/L Potassium 3.0 (L) 3.5 - 5.1 mmol/L Chloride 105 97 - 108 mmol/L  
 CO2 23 21 - 32 mmol/L Anion gap 10 5 - 15 mmol/L Glucose 142 (H) 65 - 100 mg/dL BUN 4 (L) 6 - 20 MG/DL Creatinine 0.66 0.55 - 1.02 MG/DL  
 BUN/Creatinine ratio 6 (L) 12 - 20 GFR est AA >60 >60 ml/min/1.73m2 GFR est non-AA >60 >60 ml/min/1.73m2 Calcium 8.6 8.5 - 10.1 MG/DL  
CBC WITH AUTOMATED DIFF Collection Time: 09/08/18  5:45 AM  
Result Value Ref Range WBC 8.7 3.6 - 11.0 K/uL  
 RBC 2.51 (L) 3.80 - 5.20 M/uL HGB 8.4 (L) 11.5 - 16.0 g/dL HCT 23.7 (L) 35.0 - 47.0 % MCV 94.4 80.0 - 99.0 FL  
 MCH 33.5 26.0 - 34.0 PG  
 MCHC 35.4 30.0 - 36.5 g/dL  
 RDW 16.0 (H) 11.5 - 14.5 % PLATELET 133 685 - 042 K/uL MPV 11.1 8.9 - 12.9 FL  
 NRBC 1.4 (H) 0  WBC ABSOLUTE NRBC 0.12 (H) 0.00 - 0.01 K/uL NEUTROPHILS 85 (H) 32 - 75 % LYMPHOCYTES 10 (L) 12 - 49 % MONOCYTES 4 (L) 5 - 13 % EOSINOPHILS 1 0 - 7 % BASOPHILS 0 0 - 1 % IMMATURE GRANULOCYTES 1 (H) 0.0 - 0.5 % ABS. NEUTROPHILS 7.4 1.8 - 8.0 K/UL  
 ABS. LYMPHOCYTES 0.8 0.8 - 3.5 K/UL  
 ABS. MONOCYTES 0.3 0.0 - 1.0 K/UL  
 ABS. EOSINOPHILS 0.1 0.0 - 0.4 K/UL  
 ABS. BASOPHILS 0.0 0.0 - 0.1 K/UL  
 ABS. IMM. GRANS. 0.1 (H) 0.00 - 0.04 K/UL  
 DF AUTOMATED URINALYSIS W/MICROSCOPIC Collection Time: 09/08/18  8:57 AM  
Result Value Ref Range Color YELLOW Appearance CLEAR CLEAR Specific gravity 1.010 1.003 - 1.030    
 pH (UA) 6.5 5.0 - 8.0 Protein TRACE (A) NEG mg/dL Glucose NEGATIVE  NEG mg/dL Ketone SMALL (A) NEG mg/dL Bilirubin NEGATIVE  NEG Blood TRACE (A) NEG Urobilinogen 0.1 (L) 0.2 - 1.0 EU/dL Nitrites NEGATIVE  NEG Leukocyte Esterase NEGATIVE  NEG    
 WBC 0-4 0 - 4 /hpf  
 RBC 5-10 0 - 5 /hpf Epithelial cells FEW FEW /lpf Bacteria 1+ (A) NEG /hpf Patient Vitals for the past 12 hrs: 
 Temp Pulse Resp BP SpO2 09/08/18 0814 98.4 °F (36.9 °C) 92 18 (!) 181/94 100 % 09/08/18 0610 99.1 °F (37.3 °C) 80 17 (!) 166/95 100 % 09/08/18 0515 98.6 °F (37 °C) 80 16 160/80 -  
09/07/18 2355 97.7 °F (36.5 °C) 85 16 164/85 100 % Pt complains of pain 8/10. Some itching with dilaudid but controlled with benadryl. Nauseated despite Zofran. Exam:  Alert. Lungs clear. Heart RRR. Abdomen benign. Assessment: SCC with pain, nausea Hypertension with elevated BP Dilaudid increased frequency to q3h. Phenergan added.

## 2018-09-08 NOTE — IP AVS SNAPSHOT
[address] 
[phone] Patient: Lilliana Boland MRN: [id number] :1960 A check kristie indicates which time of day the medication should be taken. My Medications CONTINUE taking these medications Instructions Each Dose to Equal  
 Morning Noon Evening Bedtime  
 citalopram 10 mg tablet Commonly known as:  Terell Rosen Your last dose was: Your next dose is: TAKE 1 TABLET BY MOUTH EVERY NIGHT AT BEDTIME  
     
   
   
   
  
 cyclobenzaprine 10 mg tablet Commonly known as:  FLEXERIL Your last dose was: Your next dose is: Take 1 Tab by mouth three (3) times daily as needed for Muscle Spasm(s). 10 mg  
    
   
   
   
  
 fentaNYL 75 mcg/hr Commonly known as:  Floyd Singh Your last dose was: Your next dose is:    
   
   
 1 Patch by TransDERmal route every seventy-two (72) hours. Max Daily Amount: 1 Patch. 1 Patch  
    
   
   
   
  
 fluocinoNIDE 0.05 % topical cream  
Commonly known as:  LIDEX Your last dose was: Your next dose is:    
   
   
 two (2) times daily as needed. folic acid 1 mg tablet Commonly known as:  Anand Your last dose was: Your next dose is: Take 1 Tab by mouth daily. 1 mg HYDROcodone-acetaminophen  mg tablet Commonly known as:  Stacy Hankins Your last dose was: Your next dose is: Take 1 Tab by mouth every six (6) hours as needed. Max Daily Amount: 4 Tabs. Indications: sickl;e cell crisis 1 Tab  
    
   
   
   
  
 losartan-hydroCHLOROthiazide 50-12.5 mg per tablet Commonly known as:  HYZAAR Your last dose was: Your next dose is: TAKE 1 TABLET BY MOUTH DAILY  
     
   
   
   
  
 OMEPRAZOLE PO Your last dose was: Your next dose is: Take 20 mg by mouth daily. 20 mg  
    
   
   
   
  
 ondansetron 4 mg disintegrating tablet Commonly known as:  ZOFRAN ODT Your last dose was: Your next dose is: Take 1 Tab by mouth every eight (8) hours as needed for Nausea. 4 mg  
    
   
   
   
  
 promethazine 25 mg suppository Commonly known as:  PHENERGAN Your last dose was: Your next dose is: Insert 1 Suppository into rectum every six (6) hours as needed for Nausea.   
 25 mg

## 2018-09-08 NOTE — ED PROVIDER NOTES
EMERGENCY DEPARTMENT HISTORY AND PHYSICAL EXAM 
 
 
Date: 9/8/2018 Patient Name: Tereso Cabrera History of Presenting Illness Chief Complaint Patient presents with  Sickle Cell Crisis  
  since 8pm. pt appears pale and diaphoretic in triage  Vomiting  Elbow Pain History Provided By: Patient HPI: Tereso Cabrera, 56 y.o. female with PMHx significant for HTN, anemia, sickle cell disease, osteomalacia, presents ambulatory to the ED with cc of gradual onset nausea/vomiting, according to pt sxs are consistent with sickle cell crisis. Pt reports at 2000 pta, sxs of nausea/vomiting, and bilateral elbow pain became onset. Pt reports elbow pain is nml during sickle cell crisis episodes. Pt reports that she normally uses Fentanyl patch for sickle cell crisis episodes, but denies using one pta. Pt specifically denies currently using Hydroxyurea. Pt reports last sickle cell crisis episode was in 07/15/2018, and reports that she was admitted to hospital. Pt reports that this specific episodes feels worse than nml. Pt denies fever, chills, HA, hematemesis, or abdominal pain. There are no other complaints, changes, or physical findings at this time. PCP: Blanca Shrestha MD 
 
Current Facility-Administered Medications Medication Dose Route Frequency Provider Last Rate Last Dose  
 0.9% sodium chloride with KCl 20 mEq/L infusion   IntraVENous CONTINUOUS Adrian Montemayor MD      
 
Current Outpatient Prescriptions Medication Sig Dispense Refill  fentaNYL (DURAGESIC) 75 mcg/hr 1 Patch by TransDERmal route every seventy-two (72) hours. Max Daily Amount: 1 Patch. 10 Patch 0  
 HYDROcodone-acetaminophen (NORCO)  mg tablet Take 1 Tab by mouth every six (6) hours as needed. Max Daily Amount: 4 Tabs. Indications: sickl;e cell crisis 120 Tab 0  
 ondansetron (ZOFRAN ODT) 4 mg disintegrating tablet Take 1 Tab by mouth every eight (8) hours as needed for Nausea.  10 Tab 0  
  promethazine (PHENERGAN) 25 mg suppository Insert 1 Suppository into rectum every six (6) hours as needed for Nausea. 12 Suppository 0  
 folic acid (FOLVITE) 1 mg tablet Take 1 Tab by mouth daily. 30 Tab 11  
 OMEPRAZOLE PO Take 20 mg by mouth.  cyclobenzaprine (FLEXERIL) 10 mg tablet Take 1 Tab by mouth three (3) times daily as needed for Muscle Spasm(s). 50 Tab 1  citalopram (CELEXA) 10 mg tablet TAKE 1 TABLET BY MOUTH EVERY NIGHT AT BEDTIME 90 Tab 3  
 losartan-hydroCHLOROthiazide (HYZAAR) 50-12.5 mg per tablet TAKE 1 TABLET BY MOUTH DAILY 90 Tab 3  
 fluocinoNIDE (LIDEX) 0.05 % topical cream two (2) times daily as needed. Past History Past Medical History: 
Past Medical History:  
Diagnosis Date  Anemia SC hemoglobinopathy  Chronic pain  Depression  Hypertension  Liver disease   
 hepatitis C; pt reports \"cured\"  Sickle cell disease ([facility] [address] Past Surgical History: 
Past Surgical History:  
Procedure Laterality Date  BREAST SURGERY PROCEDURE UNLISTED 2 cysts removed from R breast  
 CHEST SURGERY PROCEDURE UNLISTED    
 status post thor for removal of a benign  HX BREAST BIOPSY Right 05/2007  
 negative  HX CHOLECYSTECTOMY  HX ORTHOPAEDIC    
 bony curettage of an ostial myelitis focus Family History: 
Family History Problem Relation Age of Onset  Hypertension Mother  Hypertension Father Social History: 
Social History Substance Use Topics  Smoking status: Never Smoker  Smokeless tobacco: Never Used  Alcohol use No  
 
 
Allergies: Allergies Allergen Reactions  Dilaudid [Hydromorphone (Bulk)] Itching Can tolerate with benadryl and ondansetron  Percocet [Oxycodone-Acetaminophen] Itching Review of Systems Review of Systems Constitutional: Negative for chills and fever. HENT: Negative for congestion and sore throat. Eyes: Negative for visual disturbance. Respiratory: Negative for cough and shortness of breath. Cardiovascular: Negative for chest pain and leg swelling. Gastrointestinal: Positive for nausea and vomiting. Negative for abdominal pain, blood in stool and diarrhea. Endocrine: Negative for polyuria. Genitourinary: Negative for dysuria, flank pain, vaginal bleeding and vaginal discharge. Musculoskeletal: Positive for myalgias (bilateral elbows). Skin: Negative for rash. Allergic/Immunologic: Negative for immunocompromised state. Neurological: Negative for weakness and headaches. Psychiatric/Behavioral: Negative for confusion. Physical Exam  
Physical Exam  
Constitutional: She is oriented to person, place, and time. She appears well-developed and well-nourished. HENT:  
Head: Normocephalic and atraumatic. Moist mucous membranes Eyes: Conjunctivae are normal. Pupils are equal, round, and reactive to light. Right eye exhibits no discharge. Left eye exhibits no discharge. Neck: Normal range of motion. Neck supple. No tracheal deviation present. Cardiovascular: Normal rate, regular rhythm and normal heart sounds. No murmur heard. Pulmonary/Chest: Effort normal and breath sounds normal. No respiratory distress. She has no wheezes. She has no rales. Abdominal: Soft. Bowel sounds are normal. There is no tenderness. There is no rebound and no guarding. Musculoskeletal: Normal range of motion. She exhibits no edema, tenderness or deformity. No bilateral elbow fusion, no erythema of skin; full ROM of bilateral elbows, painless Neurological: She is alert and oriented to person, place, and time. Skin: Skin is warm and dry. No rash noted. No erythema. Psychiatric: Her behavior is normal.  
Nursing note and vitals reviewed. Diagnostic Study Results Labs - Recent Results (from the past 12 hour(s)) CBC WITH AUTOMATED DIFF Collection Time: 09/08/18 12:42 AM  
Result Value Ref Range WBC 6.1 3.6 - 11.0 K/uL  
 RBC 2.79 (L) 3.80 - 5.20 M/uL HGB 9.4 (L) 11.5 - 16.0 g/dL HCT 26.3 (L) 35.0 - 47.0 % MCV 94.3 80.0 - 99.0 FL  
 MCH 33.7 26.0 - 34.0 PG  
 MCHC 35.7 30.0 - 36.5 g/dL  
 RDW 16.0 (H) 11.5 - 14.5 % PLATELET 982 (H) 811 - 400 K/uL MPV 11.1 8.9 - 12.9 FL  
 NRBC 2.8 (H) 0  WBC ABSOLUTE NRBC 0.17 (H) 0.00 - 0.01 K/uL NEUTROPHILS 77 (H) 32 - 75 % LYMPHOCYTES 15 12 - 49 % MONOCYTES 7 5 - 13 % EOSINOPHILS 1 0 - 7 % BASOPHILS 0 0 - 1 % IMMATURE GRANULOCYTES 0 0.0 - 0.5 % ABS. NEUTROPHILS 4.7 1.8 - 8.0 K/UL  
 ABS. LYMPHOCYTES 0.9 0.8 - 3.5 K/UL  
 ABS. MONOCYTES 0.4 0.0 - 1.0 K/UL  
 ABS. EOSINOPHILS 0.0 0.0 - 0.4 K/UL  
 ABS. BASOPHILS 0.0 0.0 - 0.1 K/UL  
 ABS. IMM. GRANS. 0.0 0.00 - 0.04 K/UL  
 DF AUTOMATED    
RETICULOCYTE COUNT Collection Time: 09/08/18 12:42 AM  
Result Value Ref Range Reticulocyte count 4.0 (H) 0.7 - 2.1 % Absolute Retic Cnt. 0.1105 (H) 0.0164 - 0.0776 M/ul LD Collection Time: 09/08/18 12:42 AM  
Result Value Ref Range  (H) 81 - 713 U/L  
METABOLIC PANEL, COMPREHENSIVE Collection Time: 09/08/18 12:42 AM  
Result Value Ref Range Sodium 136 136 - 145 mmol/L Potassium 3.1 (L) 3.5 - 5.1 mmol/L Chloride 102 97 - 108 mmol/L  
 CO2 21 21 - 32 mmol/L Anion gap 13 5 - 15 mmol/L Glucose 181 (H) 65 - 100 mg/dL BUN 7 6 - 20 MG/DL Creatinine 1.06 (H) 0.55 - 1.02 MG/DL  
 BUN/Creatinine ratio 7 (L) 12 - 20 GFR est AA >60 >60 ml/min/1.73m2 GFR est non-AA 54 (L) >60 ml/min/1.73m2 Calcium 9.5 8.5 - 10.1 MG/DL Bilirubin, total 2.5 (H) 0.2 - 1.0 MG/DL  
 ALT (SGPT) 16 12 - 78 U/L  
 AST (SGOT) 29 15 - 37 U/L Alk. phosphatase 117 45 - 117 U/L Protein, total 10.4 (H) 6.4 - 8.2 g/dL Albumin 4.4 3.5 - 5.0 g/dL Globulin 6.0 (H) 2.0 - 4.0 g/dL A-G Ratio 0.7 (L) 1.1 - 2.2 LIPASE Collection Time: 09/08/18 12:42 AM  
Result Value Ref Range  Lipase 338 73 - 393 U/L  
 
 
 Radiologic Studies -  
XR CHEST PORT    (Results Pending) CT Results  (Last 48 hours) None CXR Results  (Last 48 hours) None Medical Decision Making I am the first provider for this patient. I reviewed the vital signs, available nursing notes, past medical history, past surgical history, family history and social history. Vital Signs-Reviewed the patient's vital signs. Patient Vitals for the past 12 hrs: 
 Temp Pulse Resp BP SpO2  
09/07/18 2355 97.7 °F (36.5 °C) 85 16 164/85 100 % Pulse Oximetry Analysis - 100% on RA Cardiac Monitor:  
Rate: 85 bpm 
Rhythm: Normal Sinus Rhythm 2355 Records Reviewed: Nursing Notes and Old Medical Records Provider Notes (Medical Decision Making): DDx: consistent with sickle cell acute crisis. Will obtain labs and provide Benadryl for pain control. ED Course:  
Initial assessment performed. The patients presenting problems have been discussed, and they are in agreement with the care plan formulated and outlined with them. I have encouraged them to ask questions as they arise throughout their visit. Consult Note: 
4:17 AM 
Sera Griffiths DO spoke with Dominick Keller MD, Specialty: Hospitalist 
Discussed pt's hx, disposition, and available diagnostic and imaging results. Reviewed care plans. Consultant agrees with plans as outlined. Dominick Keller MD will admit pt. Critical Care Time:  
0 minutes Disposition: 
Admit Note: 
4:17 AM 
Pt is being admitted by Dominick Keller MD. The results of their tests and reason(s) for their admission have been discussed with pt and/or available family. They convey agreement and understanding for the need to be admitted and for admission diagnosis. Return to ED if worse Diagnosis Clinical Impression: 1. Sickle cell pain crisis ([address] 2. Vomiting, intractability of vomiting not specified, presence of nausea not specified, unspecified vomiting type Attestations: This note is prepared by Claressa Aase. Irungu, acting as Scribe for [employer], DO. [employer], DO: The scribe's documentation has been prepared under my direction and personally reviewed by me in its entirety. I confirm that the note above accurately reflects all work, treatment, procedures, and medical decision making performed by me.

## 2018-09-08 NOTE — H&P
Hospitalist Admission Note NAME: Dereje Sampson :  1960 MRN:  [id number] Date/Time:  2018 4:45 AM 
 
Patient PCP: Ky Barajas MD 
______________________________________________________________________ Given the patient's current clinical presentation, I have a high level of concern for decompensation if discharged from the emergency department. Complex decision making was performed, which includes reviewing the patient's available past medical records, laboratory results, and x-ray films. My assessment of this patient's clinical condition and my plan of care is as follows. Assessment / Plan: 
 
Principal Problem: 
  Sickle cell pain crisis ([facility] [address] (2018) -started on pain medication -IV fluid  
-monitor HG , retic count ,renal function  
-follow up CXR Active Problems: Hypertension (10/3/2014) -continue home medication Hyzaar Depression (10/5/2014) -continue Celexa 10 mg daily Nausea and vomiting (7/15/2018) -continue Zofran Code Status: full code Surrogate Decision Maker:Sherron Lowe Daughter DVT Prophylaxis: heparin GI Prophylaxis: not indicated Baseline: independent Subjective: CHIEF COMPLAINT: Elbow pain HISTORY OF PRESENT ILLNESS:    
Charleen Whaley is a 56 y.o.  female  PMHx significant for HTN, anemia, sickle cell disease, osteomalacia who presents with B/L Elbow pain started associated with sever nausea and vomiting denies any fever any chills , patient was recently admitted to the hospital for same complain , patient denies any fever any chills . We were asked to admit for work up and evaluation of the above problems. Past Medical History:  
Diagnosis Date  Anemia SC hemoglobinopathy  Chronic pain  Depression  Hypertension  Liver disease   
 hepatitis C; pt reports \"cured\"  Sickle cell disease ([facility] [address] Past Surgical History: Procedure Laterality Date  BREAST SURGERY PROCEDURE UNLISTED 2 cysts removed from R breast  
 CHEST SURGERY PROCEDURE UNLISTED    
 status post thor for removal of a benign  HX BREAST BIOPSY Right 05/2007  
 negative  HX CHOLECYSTECTOMY  HX ORTHOPAEDIC    
 bony curettage of an ostial myelitis focus Social History Substance Use Topics  Smoking status: Never Smoker  Smokeless tobacco: Never Used  Alcohol use No  
  
 
Family History Problem Relation Age of Onset  Hypertension Mother  Hypertension Father Allergies Allergen Reactions  Dilaudid [Hydromorphone (Bulk)] Itching Can tolerate with benadryl and ondansetron  Percocet [Oxycodone-Acetaminophen] Itching Prior to Admission medications Medication Sig Start Date End Date Taking? Authorizing Provider  
fentaNYL (DURAGESIC) 75 mcg/hr 1 Patch by TransDERmal route every seventy-two (72) hours. Max Daily Amount: 1 Patch. 9/5/18   Julianne Evans MD  
HYDROcodone-acetaminophen [facility])  mg tablet Take 1 Tab by mouth every six (6) hours as needed. Max Daily Amount: 4 Tabs. Indications: sickl;e cell crisis 8/24/18   Julianne Evans MD  
ondansetron (ZOFRAN ODT) 4 mg disintegrating tablet Take 1 Tab by mouth every eight (8) hours as needed for Nausea. 7/15/18   Roseann Martinez MD  
promethazine (PHENERGAN) 25 mg suppository Insert 1 Suppository into rectum every six (6) hours as needed for Nausea. 7/15/18   Roseann Martinez MD  
folic acid (FOLVITE) 1 mg tablet Take 1 Tab by mouth daily. 6/12/18   Julianne Evans MD  
OMEPRAZOLE PO Take 20 mg by mouth. Ean Porter MD  
cyclobenzaprine (FLEXERIL) 10 mg tablet Take 1 Tab by mouth three (3) times daily as needed for Muscle Spasm(s).  11/28/17   Julianne Evans MD  
citalopram (CELEXA) 10 mg tablet TAKE 1 TABLET BY MOUTH EVERY NIGHT AT BEDTIME 11/28/17   Julianne Evans MD  
losartan-hydroCHLOROthiazide [facility]) 50-12.5 mg per tablet TAKE 1 TABLET BY MOUTH DAILY 11/16/17   Emigdio Raman MD  
fluocinoNIDE (LIDEX) 0.05 % topical cream two (2) times daily as needed. 8/1/14   Historical Provider REVIEW OF SYSTEMS:    
I am not able to complete the review of systems because: The patient is intubated and sedated The patient has altered mental status due to his acute medical problems The patient has baseline aphasia from prior stroke(s) The patient has baseline dementia and is not reliable historian The patient is in acute medical distress and unable to provide information Total of 12 systems reviewed as follows:   
   POSITIVE= underlined text  Negative = text not underlined General:  fever, chills, sweats, generalized weakness, weight loss/gain,  
   loss of appetite Eyes:    blurred vision, eye pain, loss of vision, double vision ENT:    rhinorrhea, pharyngitis Respiratory:   cough, sputum production, SOB, MARS, wheezing, pleuritic pain  
Cardiology:   chest pain, palpitations, orthopnea, PND, edema, syncope Gastrointestinal:  abdominal pain , N/V, diarrhea, dysphagia, constipation, bleeding Genitourinary:  frequency, urgency, dysuria, hematuria, incontinence Muskuloskeletal :  arthralgia, myalgia, back pain Hematology:  easy bruising, nose or gum bleeding, lymphadenopathy Dermatological: rash, ulceration, pruritis, color change / jaundice Endocrine:   hot flashes or polydipsia Neurological:  headache, dizziness, confusion, focal weakness, paresthesia, Speech difficulties, memory loss, gait difficulty Psychological: Feelings of anxiety, depression, agitation Objective: VITALS:   
Visit Vitals  /85 (BP 1 Location: Left arm, BP Patient Position: At rest)  Pulse 85  Temp 97.7 °F (36.5 °C)  Resp 16  
 Ht 5' 11\" (1.803 m)  Wt 83.9 kg (185 lb)  SpO2 100%  BMI 25.8 kg/m2 PHYSICAL EXAM: 
 
 
_______________________________________________________________________ Care Plan discussed with: 
  Comments Patient y Family  y   
RN Care Manager Consultant:     
_______________________________________________________________________ Expected  Disposition:  
Home with Family y HH/PT/OT/RN   
SNF/LTC   
JEREMY   
________________________________________________________________________ TOTAL TIME:  40 Minutes Critical Care Provided     Minutes non procedure based Comments  
 y Reviewed previous records  
>50% of visit spent in counseling and coordination of care y Discussion with patient and/or family and questions answered 
  
 
________________________________________________________________________ Signed: Vivian Vera MD 
 
Procedures: see electronic medical records for all procedures/Xrays and details which were not copied into this note but were reviewed prior to creation of Plan. LAB DATA REVIEWED:   
Recent Results (from the past 24 hour(s)) CBC WITH AUTOMATED DIFF Collection Time: 09/08/18 12:42 AM  
Result Value Ref Range WBC 6.1 3.6 - 11.0 K/uL RBC 2.79 (L) 3.80 - 5.20 M/uL HGB 9.4 (L) 11.5 - 16.0 g/dL HCT 26.3 (L) 35.0 - 47.0 % MCV 94.3 80.0 - 99.0 FL  
 MCH 33.7 26.0 - 34.0 PG  
 MCHC 35.7 30.0 - 36.5 g/dL  
 RDW 16.0 (H) 11.5 - 14.5 % PLATELET 530 (H) 493 - 400 K/uL MPV 11.1 8.9 - 12.9 FL  
 NRBC 2.8 (H) 0  WBC ABSOLUTE NRBC 0.17 (H) 0.00 - 0.01 K/uL NEUTROPHILS 77 (H) 32 - 75 % LYMPHOCYTES 15 12 - 49 % MONOCYTES 7 5 - 13 % EOSINOPHILS 1 0 - 7 % BASOPHILS 0 0 - 1 % IMMATURE GRANULOCYTES 0 0.0 - 0.5 % ABS. NEUTROPHILS 4.7 1.8 - 8.0 K/UL  
 ABS. LYMPHOCYTES 0.9 0.8 - 3.5 K/UL  
 ABS. MONOCYTES 0.4 0.0 - 1.0 K/UL  
 ABS. EOSINOPHILS 0.0 0.0 - 0.4 K/UL  
 ABS. BASOPHILS 0.0 0.0 - 0.1 K/UL  
 ABS. IMM. GRANS. 0.0 0.00 - 0.04 K/UL  
 DF AUTOMATED    
RETICULOCYTE COUNT Collection Time: 09/08/18 12:42 AM  
Result Value Ref Range Reticulocyte count 4.0 (H) 0.7 - 2.1 % Absolute Retic Cnt. 0.1105 (H) 0.0164 - 0.0776 M/ul LD Collection Time: 09/08/18 12:42 AM  
Result Value Ref Range  (H) 81 - 916 U/L  
METABOLIC PANEL, COMPREHENSIVE Collection Time: 09/08/18 12:42 AM  
Result Value Ref Range Sodium 136 136 - 145 mmol/L Potassium 3.1 (L) 3.5 - 5.1 mmol/L Chloride 102 97 - 108 mmol/L  
 CO2 21 21 - 32 mmol/L Anion gap 13 5 - 15 mmol/L Glucose 181 (H) 65 - 100 mg/dL BUN 7 6 - 20 MG/DL Creatinine 1.06 (H) 0.55 - 1.02 MG/DL  
 BUN/Creatinine ratio 7 (L) 12 - 20 GFR est AA >60 >60 ml/min/1.73m2 GFR est non-AA 54 (L) >60 ml/min/1.73m2 Calcium 9.5 8.5 - 10.1 MG/DL Bilirubin, total 2.5 (H) 0.2 - 1.0 MG/DL  
 ALT (SGPT) 16 12 - 78 U/L  
 AST (SGOT) 29 15 - 37 U/L Alk. phosphatase 117 45 - 117 U/L Protein, total 10.4 (H) 6.4 - 8.2 g/dL Albumin 4.4 3.5 - 5.0 g/dL Globulin 6.0 (H) 2.0 - 4.0 g/dL A-G Ratio 0.7 (L) 1.1 - 2.2 LIPASE Collection Time: 09/08/18 12:42 AM  
Result Value Ref Range  Lipase 338 73 - 393 U/L

## 2018-09-08 NOTE — ROUTINE PROCESS
Bedside shift change report given to Suzanne Mijares RN (oncoming nurse) by Thania Garcia RN (offgoing nurse). Report included the following information SBAR, Kardex, Intake/Output, MAR and Accordion.

## 2018-09-08 NOTE — IP AVS SNAPSHOT
[address] [facility] 
[phone] Patient: Anthony Obregon MRN: [id number] :1960 About your hospitalization You were admitted on:  2018 You last received care in the:  [facility] 2 GENERAL SURGERY You were discharged on:  2018 Why you were hospitalized Your primary diagnosis was:  Sickle Cell Pain Crisis (Hcc) Your diagnoses also included:  Depression, Nausea And Vomiting, Hypertension Follow-up Information Follow up With Details Comments Contact Info Sabas Brantley MD Go on 2018 Hospital follow-up scheduled at 1:15pm ( If you have questions or need to reschedule please call [facility] [address] [address] 
[phone] Your Scheduled Appointments 2018  1:15 PM EDT TRANSITIONAL CARE MANAGEMENT with Sabas Brantley MD  
[address] [place] [address] and Primary Care [address] [place] [address]) [phone]  
[phone] 2018 10:30 AM EDT Any with Sabas Brantley MD  
[address] [place] [address] and Primary Care [address] [place] [address]) [phone]  
[phone] Discharge Orders None A check kristie indicates which time of day the medication should be taken. My Medications CONTINUE taking these medications Instructions Each Dose to Equal  
 Morning Noon Evening Bedtime  
 citalopram 10 mg tablet Commonly known as:  Davion Mao Your last dose was: Your next dose is: TAKE 1 TABLET BY MOUTH EVERY NIGHT AT BEDTIME  
     
   
   
   
  
 cyclobenzaprine 10 mg tablet Commonly known as:  FLEXERIL Your last dose was: Your next dose is: Take 1 Tab by mouth three (3) times daily as needed for Muscle Spasm(s).   
 10 mg  
    
   
   
   
  
 fentaNYL 75 mcg/hr Commonly known as:  Roberto Paige Your last dose was: Your next dose is:    
   
   
 1 Patch by TransDERmal route every seventy-two (72) hours. Max Daily Amount: 1 Patch. 1 Patch  
    
   
   
   
  
 fluocinoNIDE 0.05 % topical cream  
Commonly known as:  LIDEX Your last dose was: Your next dose is:    
   
   
 two (2) times daily as needed. folic acid 1 mg tablet Commonly known as:  Google Your last dose was: Your next dose is: Take 1 Tab by mouth daily. 1 mg HYDROcodone-acetaminophen  mg tablet Commonly known as:  Sweta Gauze Your last dose was: Your next dose is: Take 1 Tab by mouth every six (6) hours as needed. Max Daily Amount: 4 Tabs. Indications: sickl;e cell crisis 1 Tab  
    
   
   
   
  
 losartan-hydroCHLOROthiazide 50-12.5 mg per tablet Commonly known as:  HYZAAR Your last dose was: Your next dose is: TAKE 1 TABLET BY MOUTH DAILY  
     
   
   
   
  
 OMEPRAZOLE PO Your last dose was: Your next dose is: Take 20 mg by mouth daily. 20 mg  
    
   
   
   
  
 ondansetron 4 mg disintegrating tablet Commonly known as:  ZOFRAN ODT Your last dose was: Your next dose is: Take 1 Tab by mouth every eight (8) hours as needed for Nausea. 4 mg  
    
   
   
   
  
 promethazine 25 mg suppository Commonly known as:  PHENERGAN Your last dose was: Your next dose is: Insert 1 Suppository into rectum every six (6) hours as needed for Nausea. 25 mg Opioid Education  Prescription Opioids: What You Need to Know: 
 
Prescription opioids can be used to help relieve moderate-to-severe pain and are often prescribed following a surgery or injury, or for certain health conditions. These medications can be an important part of treatment but also come with serious risks. Opioids are strong pain medicines. Examples include hydrocodone, oxycodone, fentanyl, and morphine. Heroin is an example of an illegal opioid. It is important to work with your health care provider to make sure you are getting the safest, most effective care. WHAT ARE THE RISKS AND SIDE EFFECTS OF OPIOID USE? Prescription opioids carry serious risks of addiction and overdose, especially with prolonged use. An opioid overdose, often marked by slow breathing, can cause sudden death. The use of prescription opioids can have a number of side effects as well, even when taken as directed. · Tolerance-meaning you might need to take more of a medication for the same pain relief · Physical dependence-meaning you have symptoms of withdrawal when the medication is stopped. Withdrawal symptoms can include nausea, sweating, chills, diarrhea, stomach cramps, and muscle aches. Withdrawal can last up to several weeks, depending on which drug you took and how long you took it. · Increased sensitivity to pain · Constipation · Nausea, vomiting, and dry mouth · Sleepiness and dizziness · Confusion · Depression · Low levels of testosterone that can result in lower sex drive, energy, and strength · Itching and sweating RISKS ARE GREATER WITH:      
· History of drug misuse, substance use disorder, or overdose · Mental health conditions (such as depression or anxiety) · Sleep apnea · Older age (72 years or older) · Pregnancy Avoid alcohol while taking prescription opioids. Also, unless specifically advised by your health care provider, medications to avoid include: · Benzodiazepines (such as Xanax or Valium) · Muscle relaxants (such as Soma or Flexeril) · Hypnotics (such as Ambien or Lunesta) · Other prescription opioids KNOW YOUR OPTIONS Talk to your health care provider about ways to manage your pain that don't involve prescription opioids. Some of these options may actually work better and have fewer risks and side effects. Consult your physician before adding or stopping any medications, treatments, or physical activity. Options may include: 
· Pain relievers such as acetaminophen, ibuprofen, and naproxen · Some medications that are also used for depression or seizures · Physical therapy and exercise · Counseling to help patients learn how to cope better with triggers of pain and stress. · Application of heat or cold compress · Massage therapy · Relaxation techniques Be Informed Make sure you know the name of your medication, how much and how often to take it, and its potential risks & side effects. IF YOU ARE PRESCRIBED OPIOIDS FOR PAIN: 
· Never take opioids in greater amounts or more often than prescribed. Remember the goal is not to be pain-free but to manage your pain at a tolerable level. · Follow up with your primary care provider to: · Work together to create a plan on how to manage your pain. · Talk about ways to help manage your pain that don't involve prescription opioids. · Talk about any and all concerns and side effects. · Help prevent misuse and abuse. · Never sell or share prescription opioids · Help prevent misuse and abuse. · Store prescription opioids in a secure place and out of reach of others (this may include visitors, children, friends, and family). · Safely dispose of unused/unwanted prescription opioids: Find your community drug take-back program or your pharmacy mail-back program, or flush them down the toilet, following guidance from the Food and Drug Administration (www.fda.gov/Drugs/ResourcesForYou). · Visit www.cdc.gov/drugoverdose to learn about the risks of opioid abuse and overdose.  
· If you believe you may be struggling with addiction, tell your health care provider and ask for guidance or call [address] [place] [address] at [phone]. Discharge Instructions General Discharge Instructions Patient ID: Tyler Sterling [id number] 
56 y.o. 
1960 Patient Instructions The following personal items were collected during your admission and were returned to you. Take Home Medications What to do at [facility] Recommended diet: Regular Diet Recommended activity: Activity as tolerated Follow-up with Anam Obrien MD  in 5 days. Information obtained by : 
I understand that if any problems occur once I am at home I am to contact my physician. I understand and acknowledge receipt of the instructions indicated above. Physician's or R.N.'s Signature                                                                  Date/Time Patient or Representative Signature                                                          Date/Time 
 
 
 
ACO Transitions of Care Introducing [employer] offers a voluntary care coordination program to provide high quality service and care to [facility] fee-for-service beneficiaries. Faby Mondragon was designed to help you enhance your health and well-being through the following services: ? Transitions of Care  support for individuals who are transitioning from one care setting to another (example: Hospital to home). ?  Chronic and Complex Care Coordination  support for individuals and caregivers of those with serious or chronic illnesses or with more than one chronic (ongoing) condition and those who take a number of different medications. If you meet specific medical criteria, a [address] [place] [address] Rd may call you directly to coordinate your care with your primary care physician and your other care providers. For questions about the [facility] programs, please, contact your physicians office. For general questions or additional information about Accountable Care Organizations: 
Please visit www.medicare.gov/acos. html or call 1-800-MEDICARE (8-110.698.7083) TTY users should call 8-457.438.8148. Introducing [facility]! Dear Maninder Chaudhary: 
Thank you for requesting a Stamped account. Our records indicate that you already have an active Stamped account. You can access your account anytime at [email or web address] Did you know that you can access your hospital and ER discharge instructions at any time in Stamped? You can also review all of your test results from your hospital stay or ER visit. Additional Information If you have questions, please visit the Frequently Asked Questions section of the Stamped website at [email or web address] Remember, Stamped is NOT to be used for urgent needs. For medical emergencies, dial 911. Now available from your iPhone and Android! Introducing Dusty Tolentino As a Brigid Morauel patient, I wanted to make you aware of our electronic visit tool called Dusty Bellaembrian. Brigid Tristan 24/7 allows you to connect within minutes with a medical provider 24 hours a day, seven days a week via a mobile device or tablet or logging into a secure website from your computer. You can access Dusty Tolentino from anywhere in the [place].  
 
A virtual visit might be right for you when you have a simple condition and feel like you just dont want to get out of bed, or cant get away from work for an appointment, when your regular Marimar Yanes provider is not available (evenings, weekends or holidays), or when youre out of town and need minor care. Electronic visits cost only $49 and if the Marimar Yanes 24/7 provider determines a prescription is needed to treat your condition, one can be electronically transmitted to a nearby pharmacy*. Please take a moment to enroll today if you have not already done so. The enrollment process is free and takes just a few minutes. To enroll, please download the Marimar Joaquín 24/7 elizabeth to your tablet or phone, or visit [email or web address] to enroll on your computer. And, as an [address] [place] [address] patient with a [employer] account, the results of your visits will be scanned into your electronic medical record and your primary care provider will be able to view the scanned results. We urge you to continue to see your regular Marimar Yanes provider for your ongoing medical care. And while your primary care provider may not be the one available when you seek a Dusty Shaynebert virtual visit, the peace of mind you get from getting a real diagnosis real time can be priceless. For more information on We[employer], view our Frequently Asked Questions (FAQs) at [email or web address]. Sincerely, 
 
Constance Drake MD 
Chief Medical Officer [employer] *:  certain medications cannot be prescribed via Daily Picemfin Providers Seen During Your Hospitalization Provider Specialty Primary office phone Malgorzata Stone DO Emergency Medicine [phone] Blanca Shrestha MD Internal Medicine [phone] Your Primary Care Physician (PCP) Primary Care Physician Office Phone Office Fax 104 Anastacia Orellana [facility] [phone] You are allergic to the following Allergen Reactions Dilaudid (Hydromorphone (Bulk)) Itching Can tolerate with benadryl and ondansetron Percocet (Oxycodone-Acetaminophen) Itching Recent Documentation Height Weight BMI OB Status Smoking Status 1.803 m 83.9 kg 25.8 kg/m2 Postmenopausal Never Smoker Emergency Contacts Name Discharge Info Relation Home Work Mobile [address] [facility] CAREGIVER [3] Brother [24] [phone] Sherron Lowe DISCHARGE CAREGIVER [3] Daughter [21] [phone] Patient Belongings The following personal items are in your possession at time of discharge: 
  Dental Appliances: None  Visual Aid: Glasses, With patient      Home Medications: None   Jewelry: Sent home  Clothing: At bedside    Other Valuables: Cristal Backbone, Cell Phone Please provide this summary of care documentation to your next provider. Signatures-by signing, you are acknowledging that this After Visit Summary has been reviewed with you and you have received a copy. Patient Signature:  ____________________________________________________________ Date:  ____________________________________________________________  
  
Mary Kwok Provider Signature:  ____________________________________________________________ Date:  ____________________________________________________________

## 2018-09-08 NOTE — PROGRESS NOTES
Bedside shift change report given to [employer] (oncoming nurse) by Mary Wen RN (offgoing nurse). Report included the following information SBAR, Kardex, Procedure Summary, Intake/Output, MAR, Accordion, Recent Results and Med Rec Status, recent medications.

## 2018-09-08 NOTE — ED NOTES
TRANSFER - OUT REPORT: 
 
Verbal report given to [employer] on Sil Arora  being transferred to [facility] for routine progression of care Report consisted of patients Situation, Background, Assessment and  
Recommendations(SBAR). Information from the following report(s) SBAR, ED Summary and MAR was reviewed with the receiving nurse. Opportunity for questions and clarification was provided. Patient transported with: 
 [employer]

## 2018-09-09 LAB
ANION GAP SERPL CALC-SCNC: 8 MMOL/L (ref 5–15)
BACTERIA SPEC CULT: NORMAL
BACTERIA SPEC CULT: NORMAL
BASOPHILS # BLD: 0 K/UL (ref 0–0.1)
BASOPHILS NFR BLD: 0 % (ref 0–1)
BUN SERPL-MCNC: 3 MG/DL (ref 6–20)
BUN/CREAT SERPL: 4 (ref 12–20)
CALCIUM SERPL-MCNC: 7.7 MG/DL (ref 8.5–10.1)
CHLORIDE SERPL-SCNC: 110 MMOL/L (ref 97–108)
CO2 SERPL-SCNC: 23 MMOL/L (ref 21–32)
CREAT SERPL-MCNC: 0.69 MG/DL (ref 0.55–1.02)
DIFFERENTIAL METHOD BLD: ABNORMAL
EOSINOPHIL # BLD: 0.2 K/UL (ref 0–0.4)
EOSINOPHIL NFR BLD: 2 % (ref 0–7)
ERYTHROCYTE [DISTWIDTH] IN BLOOD BY AUTOMATED COUNT: 15.7 % (ref 11.5–14.5)
GLUCOSE SERPL-MCNC: 85 MG/DL (ref 65–100)
HCT VFR BLD AUTO: 20.2 % (ref 35–47)
HGB BLD-MCNC: 7.1 G/DL (ref 11.5–16)
IMM GRANULOCYTES # BLD: 0 K/UL (ref 0–0.04)
IMM GRANULOCYTES NFR BLD AUTO: 0 % (ref 0–0.5)
LYMPHOCYTES # BLD: 5.1 K/UL (ref 0.8–3.5)
LYMPHOCYTES NFR BLD: 52 % (ref 12–49)
MCH RBC QN AUTO: 33.6 PG (ref 26–34)
MCHC RBC AUTO-ENTMCNC: 35.1 G/DL (ref 30–36.5)
MCV RBC AUTO: 95.7 FL (ref 80–99)
MONOCYTES # BLD: 0.6 K/UL (ref 0–1)
MONOCYTES NFR BLD: 6 % (ref 5–13)
NEUTS SEG # BLD: 3.9 K/UL (ref 1.8–8)
NEUTS SEG NFR BLD: 40 % (ref 32–75)
NRBC # BLD: 0.06 K/UL (ref 0–0.01)
NRBC BLD-RTO: 0.6 PER 100 WBC
PLATELET # BLD AUTO: 280 K/UL (ref 150–400)
PMV BLD AUTO: 11.2 FL (ref 8.9–12.9)
POTASSIUM SERPL-SCNC: 3.3 MMOL/L (ref 3.5–5.1)
RBC # BLD AUTO: 2.11 M/UL (ref 3.8–5.2)
RBC MORPH BLD: ABNORMAL
RBC MORPH BLD: ABNORMAL
SERVICE CMNT-IMP: NORMAL
SODIUM SERPL-SCNC: 141 MMOL/L (ref 136–145)
WBC # BLD AUTO: 9.8 K/UL (ref 3.6–11)

## 2018-09-09 PROCEDURE — 85025 COMPLETE CBC W/AUTO DIFF WBC: CPT | Performed by: INTERNAL MEDICINE

## 2018-09-09 PROCEDURE — 74011250636 HC RX REV CODE- 250/636: Performed by: INTERNAL MEDICINE

## 2018-09-09 PROCEDURE — 74011000250 HC RX REV CODE- 250: Performed by: INTERNAL MEDICINE

## 2018-09-09 PROCEDURE — 94760 N-INVAS EAR/PLS OXIMETRY 1: CPT

## 2018-09-09 PROCEDURE — 80048 BASIC METABOLIC PNL TOTAL CA: CPT | Performed by: INTERNAL MEDICINE

## 2018-09-09 PROCEDURE — 65270000029 HC RM PRIVATE

## 2018-09-09 PROCEDURE — 77010033678 HC OXYGEN DAILY

## 2018-09-09 PROCEDURE — 36415 COLL VENOUS BLD VENIPUNCTURE: CPT | Performed by: INTERNAL MEDICINE

## 2018-09-09 RX ADMIN — PROCHLORPERAZINE EDISYLATE 10 MG: 5 INJECTION INTRAMUSCULAR; INTRAVENOUS at 12:34

## 2018-09-09 RX ADMIN — HEPARIN SODIUM 5000 UNITS: 5000 INJECTION INTRAVENOUS; SUBCUTANEOUS at 05:15

## 2018-09-09 RX ADMIN — HEPARIN SODIUM 5000 UNITS: 5000 INJECTION INTRAVENOUS; SUBCUTANEOUS at 21:03

## 2018-09-09 RX ADMIN — HYDROMORPHONE HYDROCHLORIDE 1 MG: 2 INJECTION, SOLUTION INTRAMUSCULAR; INTRAVENOUS; SUBCUTANEOUS at 03:50

## 2018-09-09 RX ADMIN — DIPHENHYDRAMINE HYDROCHLORIDE 12.5 MG: 50 INJECTION, SOLUTION INTRAMUSCULAR; INTRAVENOUS at 21:01

## 2018-09-09 RX ADMIN — Medication 10 ML: at 16:20

## 2018-09-09 RX ADMIN — HYDROMORPHONE HYDROCHLORIDE 1 MG: 2 INJECTION, SOLUTION INTRAMUSCULAR; INTRAVENOUS; SUBCUTANEOUS at 10:25

## 2018-09-09 RX ADMIN — ONDANSETRON 4 MG: 2 INJECTION INTRAMUSCULAR; INTRAVENOUS at 18:31

## 2018-09-09 RX ADMIN — SODIUM CHLORIDE AND POTASSIUM CHLORIDE: 9; 1.49 INJECTION, SOLUTION INTRAVENOUS at 01:09

## 2018-09-09 RX ADMIN — ONDANSETRON 4 MG: 2 INJECTION INTRAMUSCULAR; INTRAVENOUS at 00:11

## 2018-09-09 RX ADMIN — HYDROMORPHONE HYDROCHLORIDE 1 MG: 2 INJECTION, SOLUTION INTRAMUSCULAR; INTRAVENOUS; SUBCUTANEOUS at 21:01

## 2018-09-09 RX ADMIN — ONDANSETRON 4 MG: 2 INJECTION INTRAMUSCULAR; INTRAVENOUS at 21:02

## 2018-09-09 RX ADMIN — SODIUM CHLORIDE AND POTASSIUM CHLORIDE: 9; 1.49 INJECTION, SOLUTION INTRAVENOUS at 18:40

## 2018-09-09 RX ADMIN — ONDANSETRON 4 MG: 2 INJECTION INTRAMUSCULAR; INTRAVENOUS at 03:50

## 2018-09-09 RX ADMIN — HYDROMORPHONE HYDROCHLORIDE 1 MG: 2 INJECTION, SOLUTION INTRAMUSCULAR; INTRAVENOUS; SUBCUTANEOUS at 15:15

## 2018-09-09 RX ADMIN — DIPHENHYDRAMINE HYDROCHLORIDE 12.5 MG: 50 INJECTION, SOLUTION INTRAMUSCULAR; INTRAVENOUS at 18:32

## 2018-09-09 RX ADMIN — PROCHLORPERAZINE EDISYLATE 10 MG: 5 INJECTION INTRAMUSCULAR; INTRAVENOUS at 01:44

## 2018-09-09 RX ADMIN — ONDANSETRON 4 MG: 2 INJECTION INTRAMUSCULAR; INTRAVENOUS at 10:25

## 2018-09-09 RX ADMIN — PROCHLORPERAZINE EDISYLATE 10 MG: 5 INJECTION INTRAMUSCULAR; INTRAVENOUS at 21:02

## 2018-09-09 RX ADMIN — HYDROMORPHONE HYDROCHLORIDE 1 MG: 2 INJECTION, SOLUTION INTRAMUSCULAR; INTRAVENOUS; SUBCUTANEOUS at 18:32

## 2018-09-09 RX ADMIN — Medication 10 ML: at 05:14

## 2018-09-09 RX ADMIN — ONDANSETRON 4 MG: 2 INJECTION INTRAMUSCULAR; INTRAVENOUS at 15:17

## 2018-09-09 RX ADMIN — HYDROMORPHONE HYDROCHLORIDE 1 MG: 2 INJECTION, SOLUTION INTRAMUSCULAR; INTRAVENOUS; SUBCUTANEOUS at 06:30

## 2018-09-09 RX ADMIN — DIPHENHYDRAMINE HYDROCHLORIDE 12.5 MG: 50 INJECTION, SOLUTION INTRAMUSCULAR; INTRAVENOUS at 10:25

## 2018-09-09 RX ADMIN — Medication 10 ML: at 21:04

## 2018-09-09 RX ADMIN — HEPARIN SODIUM 5000 UNITS: 5000 INJECTION INTRAVENOUS; SUBCUTANEOUS at 16:20

## 2018-09-09 RX ADMIN — SODIUM CHLORIDE AND POTASSIUM CHLORIDE: 9; 1.49 INJECTION, SOLUTION INTRAVENOUS at 10:30

## 2018-09-09 RX ADMIN — DIPHENHYDRAMINE HYDROCHLORIDE 12.5 MG: 50 INJECTION, SOLUTION INTRAMUSCULAR; INTRAVENOUS at 03:50

## 2018-09-09 RX ADMIN — HYDROMORPHONE HYDROCHLORIDE 1 MG: 2 INJECTION, SOLUTION INTRAMUSCULAR; INTRAVENOUS; SUBCUTANEOUS at 00:11

## 2018-09-09 NOTE — ROUTINE PROCESS
Bedside shift change report given to Gaviota Mancia RN (oncoming nurse) by Uriel Araiza RN (offgoing nurse). Report included the following information SBAR, Kardex, Intake/Output, Accordion and Recent Results.

## 2018-09-09 NOTE — PROGRESS NOTES
Patient Active Problem List  
Diagnosis Code  Sickle cell anemia (HCC) D57.1  Hypertension I10  Venous insufficiency I87.2  Hepatitis C B19.20  Depression F32.9  Furuncle of axilla L02.429  
 Sickle cell crisis (HCC) D57.00  Pulsatile tinnitus H93. A9  
 Carpal tunnel syndrome of right wrist G56.01  
 Intractable nausea and vomiting R11.2  Nausea and vomiting R11.2  Vomiting R11.10  Sickle cell pain crisis ([facility] [address] D57.00 Current Facility-Administered Medications:  
  sodium chloride (NS) flush 5-10 mL, 5-10 mL, IntraVENous, Q8H, Adrian Johnson MD, 10 mL at 09/09/18 8357   sodium chloride (NS) flush 5-10 mL, 5-10 mL, IntraVENous, PRN, Jessie Jeong MD 
  heparin (porcine) injection 5,000 Units, 5,000 Units, SubCUTAneous, Q8H, Adrian Johnson MD, 5,000 Units at 09/09/18 0515 
  naloxone [facility]) injection 0.4 mg, 0.4 mg, IntraVENous, PRN, Jessie Jeong MD 
  diphenhydrAMINE (BENADRYL) injection 12.5 mg, 12.5 mg, IntraVENous, Q4H PRN, Jessie Jeong MD, 12.5 mg at 09/09/18 [phone]   ondansetron (ZOFRAN) injection 4 mg, 4 mg, IntraVENous, Q4H PRN, Jessie Jeong MD, 4 mg at 09/09/18 0350   bisacodyl (DULCOLAX) suppository 10 mg, 10 mg, Rectal, DAILY PRN, Jessie Jeong MD 
  0.9% sodium chloride with KCl 20 mEq/L infusion, , IntraVENous, CONTINUOUS, Adrian Johnson MD, Last Rate: 100 mL/hr at 09/09/18 0109 
  HYDROmorphone (PF) (DILAUDID) injection 1 mg, 1 mg, IntraVENous, Q3H PRN, True Burden III, MD, 1 mg at 09/09/18 0630   prochlorperazine (COMPAZINE) with saline injection 10 mg, 10 mg, IntraVENous, Q6H PRN, True Burden III, MD, 10 mg at 09/09/18 0144 Recent Results (from the past 12 hour(s)) METABOLIC PANEL, BASIC Collection Time: 09/09/18  4:07 AM  
Result Value Ref Range Sodium 141 136 - 145 mmol/L Potassium 3.3 (L) 3.5 - 5.1 mmol/L Chloride 110 (H) 97 - 108 mmol/L  
 CO2 23 21 - 32 mmol/L  Anion gap 8 5 - 15 mmol/L  
 Glucose 85 65 - 100 mg/dL BUN 3 (L) 6 - 20 MG/DL Creatinine 0.69 0.55 - 1.02 MG/DL  
 BUN/Creatinine ratio 4 (L) 12 - 20 GFR est AA >60 >60 ml/min/1.73m2 GFR est non-AA >60 >60 ml/min/1.73m2 Calcium 7.7 (L) 8.5 - 10.1 MG/DL  
CBC WITH AUTOMATED DIFF Collection Time: 09/09/18  4:07 AM  
Result Value Ref Range WBC 9.8 3.6 - 11.0 K/uL  
 RBC 2.11 (L) 3.80 - 5.20 M/uL HGB 7.1 (L) 11.5 - 16.0 g/dL HCT 20.2 (L) 35.0 - 47.0 % MCV 95.7 80.0 - 99.0 FL  
 MCH 33.6 26.0 - 34.0 PG  
 MCHC 35.1 30.0 - 36.5 g/dL  
 RDW 15.7 (H) 11.5 - 14.5 % PLATELET 393 714 - 220 K/uL MPV 11.2 8.9 - 12.9 FL  
 NRBC 0.6 (H) 0  WBC ABSOLUTE NRBC 0.06 (H) 0.00 - 0.01 K/uL NEUTROPHILS 40 32 - 75 % LYMPHOCYTES 52 (H) 12 - 49 % MONOCYTES 6 5 - 13 % EOSINOPHILS 2 0 - 7 % BASOPHILS 0 0 - 1 % IMMATURE GRANULOCYTES 0 0.0 - 0.5 % ABS. NEUTROPHILS 3.9 1.8 - 8.0 K/UL  
 ABS. LYMPHOCYTES 5.1 (H) 0.8 - 3.5 K/UL  
 ABS. MONOCYTES 0.6 0.0 - 1.0 K/UL  
 ABS. EOSINOPHILS 0.2 0.0 - 0.4 K/UL  
 ABS. BASOPHILS 0.0 0.0 - 0.1 K/UL  
 ABS. IMM. GRANS. 0.0 0.00 - 0.04 K/UL  
 DF MANUAL    
 RBC COMMENTS TARGET CELLS 1+ RBC COMMENTS FEW SICKLE CELLS 
PRESENT Visit Vitals  /69 (BP 1 Location: Left arm, BP Patient Position: At rest)  Pulse 72  Temp 98.7 °F (37.1 °C)  Resp 16  
 Ht 5' 11\" (1.803 m)  Wt 83.9 kg (185 lb)  SpO2 100%  BMI 25.8 kg/m2 Exam: no change Impression SCC Hypertension now controlled Hypokalemia Plan: 
KCl

## 2018-09-10 ENCOUNTER — APPOINTMENT (OUTPATIENT)
Dept: GENERAL RADIOLOGY | Age: 58
DRG: 812 | End: 2018-09-10
Attending: INTERNAL MEDICINE
Payer: MEDICARE

## 2018-09-10 LAB
ALBUMIN SERPL-MCNC: 3 G/DL (ref 3.5–5)
ALBUMIN/GLOB SERPL: 0.7 {RATIO} (ref 1.1–2.2)
ALP SERPL-CCNC: 77 U/L (ref 45–117)
ALT SERPL-CCNC: 14 U/L (ref 12–78)
ANION GAP SERPL CALC-SCNC: 9 MMOL/L (ref 5–15)
APPEARANCE UR: CLEAR
AST SERPL-CCNC: 27 U/L (ref 15–37)
BACTERIA URNS QL MICRO: NEGATIVE /HPF
BASOPHILS # BLD: 0 K/UL (ref 0–0.1)
BASOPHILS NFR BLD: 0 % (ref 0–1)
BILIRUB SERPL-MCNC: 1.5 MG/DL (ref 0.2–1)
BILIRUB UR QL: NEGATIVE
BUN SERPL-MCNC: 1 MG/DL (ref 6–20)
BUN/CREAT SERPL: 2 (ref 12–20)
CALCIUM SERPL-MCNC: 7.9 MG/DL (ref 8.5–10.1)
CHLORIDE SERPL-SCNC: 111 MMOL/L (ref 97–108)
CO2 SERPL-SCNC: 21 MMOL/L (ref 21–32)
COLOR UR: ABNORMAL
CREAT SERPL-MCNC: 0.65 MG/DL (ref 0.55–1.02)
DIFFERENTIAL METHOD BLD: ABNORMAL
EOSINOPHIL # BLD: 0.2 K/UL (ref 0–0.4)
EOSINOPHIL NFR BLD: 2 % (ref 0–7)
EPITH CASTS URNS QL MICRO: ABNORMAL /LPF
ERYTHROCYTE [DISTWIDTH] IN BLOOD BY AUTOMATED COUNT: 15.8 % (ref 11.5–14.5)
GLOBULIN SER CALC-MCNC: 4.3 G/DL (ref 2–4)
GLUCOSE SERPL-MCNC: 76 MG/DL (ref 65–100)
GLUCOSE UR STRIP.AUTO-MCNC: NEGATIVE MG/DL
HCT VFR BLD AUTO: 22.3 % (ref 35–47)
HGB BLD-MCNC: 7.6 G/DL (ref 11.5–16)
HGB UR QL STRIP: NEGATIVE
HYALINE CASTS URNS QL MICRO: ABNORMAL /LPF (ref 0–5)
IMM GRANULOCYTES # BLD: 0 K/UL (ref 0–0.04)
IMM GRANULOCYTES NFR BLD AUTO: 0 % (ref 0–0.5)
KETONES UR QL STRIP.AUTO: NEGATIVE MG/DL
LEUKOCYTE ESTERASE UR QL STRIP.AUTO: NEGATIVE
LYMPHOCYTES # BLD: 4.9 K/UL (ref 0.8–3.5)
LYMPHOCYTES NFR BLD: 49 % (ref 12–49)
MAGNESIUM SERPL-MCNC: 1.8 MG/DL (ref 1.6–2.4)
MCH RBC QN AUTO: 33.2 PG (ref 26–34)
MCHC RBC AUTO-ENTMCNC: 34.1 G/DL (ref 30–36.5)
MCV RBC AUTO: 97.4 FL (ref 80–99)
MONOCYTES # BLD: 1.2 K/UL (ref 0–1)
MONOCYTES NFR BLD: 12 % (ref 5–13)
NEUTS SEG # BLD: 3.8 K/UL (ref 1.8–8)
NEUTS SEG NFR BLD: 37 % (ref 32–75)
NITRITE UR QL STRIP.AUTO: NEGATIVE
NRBC # BLD: 0.04 K/UL (ref 0–0.01)
NRBC BLD-RTO: 0.4 PER 100 WBC
PH UR STRIP: 6.5 [PH] (ref 5–8)
PLATELET # BLD AUTO: 280 K/UL (ref 150–400)
PMV BLD AUTO: 11.9 FL (ref 8.9–12.9)
POTASSIUM SERPL-SCNC: 3.7 MMOL/L (ref 3.5–5.1)
PROT SERPL-MCNC: 7.3 G/DL (ref 6.4–8.2)
PROT UR STRIP-MCNC: NEGATIVE MG/DL
RBC # BLD AUTO: 2.29 M/UL (ref 3.8–5.2)
RBC #/AREA URNS HPF: ABNORMAL /HPF (ref 0–5)
SODIUM SERPL-SCNC: 141 MMOL/L (ref 136–145)
SP GR UR REFRACTOMETRY: <1.005 (ref 1–1.03)
UROBILINOGEN UR QL STRIP.AUTO: 4 EU/DL (ref 0.2–1)
WBC # BLD AUTO: 10.2 K/UL (ref 3.6–11)
WBC URNS QL MICRO: ABNORMAL /HPF (ref 0–4)

## 2018-09-10 PROCEDURE — C1751 CATH, INF, PER/CENT/MIDLINE: HCPCS

## 2018-09-10 PROCEDURE — 85025 COMPLETE CBC W/AUTO DIFF WBC: CPT | Performed by: INTERNAL MEDICINE

## 2018-09-10 PROCEDURE — 76937 US GUIDE VASCULAR ACCESS: CPT

## 2018-09-10 PROCEDURE — 83735 ASSAY OF MAGNESIUM: CPT | Performed by: INTERNAL MEDICINE

## 2018-09-10 PROCEDURE — 71045 X-RAY EXAM CHEST 1 VIEW: CPT

## 2018-09-10 PROCEDURE — 87086 URINE CULTURE/COLONY COUNT: CPT | Performed by: INTERNAL MEDICINE

## 2018-09-10 PROCEDURE — 36415 COLL VENOUS BLD VENIPUNCTURE: CPT | Performed by: INTERNAL MEDICINE

## 2018-09-10 PROCEDURE — 74011250636 HC RX REV CODE- 250/636: Performed by: INTERNAL MEDICINE

## 2018-09-10 PROCEDURE — 74011250637 HC RX REV CODE- 250/637: Performed by: INTERNAL MEDICINE

## 2018-09-10 PROCEDURE — 74011000258 HC RX REV CODE- 258: Performed by: INTERNAL MEDICINE

## 2018-09-10 PROCEDURE — 80053 COMPREHEN METABOLIC PANEL: CPT | Performed by: INTERNAL MEDICINE

## 2018-09-10 PROCEDURE — 77030018719 HC DRSG PTCH ANTIMIC J&J -A

## 2018-09-10 PROCEDURE — 77030018786 HC NDL GD F/USND BARD -B

## 2018-09-10 PROCEDURE — 81001 URINALYSIS AUTO W/SCOPE: CPT | Performed by: INTERNAL MEDICINE

## 2018-09-10 PROCEDURE — 74011000250 HC RX REV CODE- 250: Performed by: INTERNAL MEDICINE

## 2018-09-10 PROCEDURE — 65270000029 HC RM PRIVATE

## 2018-09-10 RX ORDER — ENOXAPARIN SODIUM 100 MG/ML
40 INJECTION SUBCUTANEOUS EVERY 24 HOURS
Status: DISCONTINUED | OUTPATIENT
Start: 2018-09-10 | End: 2018-09-20 | Stop reason: HOSPADM

## 2018-09-10 RX ORDER — ACETAMINOPHEN 325 MG/1
650 TABLET ORAL
Status: DISCONTINUED | OUTPATIENT
Start: 2018-09-10 | End: 2018-09-20 | Stop reason: HOSPADM

## 2018-09-10 RX ORDER — SODIUM CHLORIDE 0.9 % (FLUSH) 0.9 %
10 SYRINGE (ML) INJECTION EVERY 8 HOURS
Status: DISCONTINUED | OUTPATIENT
Start: 2018-09-10 | End: 2018-09-20 | Stop reason: HOSPADM

## 2018-09-10 RX ORDER — SODIUM CHLORIDE 0.9 % (FLUSH) 0.9 %
10 SYRINGE (ML) INJECTION AS NEEDED
Status: DISCONTINUED | OUTPATIENT
Start: 2018-09-10 | End: 2018-09-20 | Stop reason: HOSPADM

## 2018-09-10 RX ORDER — DEXTROSE, SODIUM CHLORIDE, AND POTASSIUM CHLORIDE 5; .45; .15 G/100ML; G/100ML; G/100ML
125 INJECTION INTRAVENOUS CONTINUOUS
Status: DISCONTINUED | OUTPATIENT
Start: 2018-09-10 | End: 2018-09-11

## 2018-09-10 RX ADMIN — ONDANSETRON 4 MG: 2 INJECTION INTRAMUSCULAR; INTRAVENOUS at 20:45

## 2018-09-10 RX ADMIN — Medication 10 ML: at 17:07

## 2018-09-10 RX ADMIN — HYDROMORPHONE HYDROCHLORIDE 1 MG: 2 INJECTION, SOLUTION INTRAMUSCULAR; INTRAVENOUS; SUBCUTANEOUS at 22:20

## 2018-09-10 RX ADMIN — ONDANSETRON 4 MG: 2 INJECTION INTRAMUSCULAR; INTRAVENOUS at 06:05

## 2018-09-10 RX ADMIN — Medication 10 ML: at 06:05

## 2018-09-10 RX ADMIN — ONDANSETRON 4 MG: 2 INJECTION INTRAMUSCULAR; INTRAVENOUS at 09:16

## 2018-09-10 RX ADMIN — DIPHENHYDRAMINE HYDROCHLORIDE 12.5 MG: 50 INJECTION, SOLUTION INTRAMUSCULAR; INTRAVENOUS at 20:45

## 2018-09-10 RX ADMIN — PROCHLORPERAZINE EDISYLATE 10 MG: 5 INJECTION INTRAMUSCULAR; INTRAVENOUS at 18:57

## 2018-09-10 RX ADMIN — DEXTROSE MONOHYDRATE, SODIUM CHLORIDE, AND POTASSIUM CHLORIDE 125 ML/HR: 50; 4.5; 1.49 INJECTION, SOLUTION INTRAVENOUS at 09:02

## 2018-09-10 RX ADMIN — HEPARIN SODIUM 5000 UNITS: 5000 INJECTION INTRAVENOUS; SUBCUTANEOUS at 06:04

## 2018-09-10 RX ADMIN — ONDANSETRON 4 MG: 2 INJECTION INTRAMUSCULAR; INTRAVENOUS at 02:07

## 2018-09-10 RX ADMIN — ACETAMINOPHEN 650 MG: 325 TABLET ORAL at 20:59

## 2018-09-10 RX ADMIN — DIPHENHYDRAMINE HYDROCHLORIDE 12.5 MG: 50 INJECTION, SOLUTION INTRAMUSCULAR; INTRAVENOUS at 16:23

## 2018-09-10 RX ADMIN — DEXTROSE MONOHYDRATE, SODIUM CHLORIDE, AND POTASSIUM CHLORIDE 125 ML/HR: 50; 4.5; 1.49 INJECTION, SOLUTION INTRAVENOUS at 18:56

## 2018-09-10 RX ADMIN — DIPHENHYDRAMINE HYDROCHLORIDE 12.5 MG: 50 INJECTION, SOLUTION INTRAMUSCULAR; INTRAVENOUS at 02:07

## 2018-09-10 RX ADMIN — HYDROMORPHONE HYDROCHLORIDE 1 MG: 2 INJECTION, SOLUTION INTRAMUSCULAR; INTRAVENOUS; SUBCUTANEOUS at 06:05

## 2018-09-10 RX ADMIN — PROCHLORPERAZINE EDISYLATE 10 MG: 5 INJECTION INTRAMUSCULAR; INTRAVENOUS at 10:37

## 2018-09-10 RX ADMIN — ONDANSETRON 4 MG: 2 INJECTION INTRAMUSCULAR; INTRAVENOUS at 16:23

## 2018-09-10 RX ADMIN — HYDROMORPHONE HYDROCHLORIDE 1 MG: 2 INJECTION, SOLUTION INTRAMUSCULAR; INTRAVENOUS; SUBCUTANEOUS at 19:16

## 2018-09-10 RX ADMIN — Medication 10 ML: at 13:05

## 2018-09-10 RX ADMIN — HYDROMORPHONE HYDROCHLORIDE 1 MG: 2 INJECTION, SOLUTION INTRAMUSCULAR; INTRAVENOUS; SUBCUTANEOUS at 00:08

## 2018-09-10 RX ADMIN — HYDROMORPHONE HYDROCHLORIDE 1 MG: 2 INJECTION, SOLUTION INTRAMUSCULAR; INTRAVENOUS; SUBCUTANEOUS at 09:14

## 2018-09-10 RX ADMIN — Medication 10 ML: at 20:48

## 2018-09-10 RX ADMIN — HYDROMORPHONE HYDROCHLORIDE 1 MG: 2 INJECTION, SOLUTION INTRAMUSCULAR; INTRAVENOUS; SUBCUTANEOUS at 12:24

## 2018-09-10 RX ADMIN — DIPHENHYDRAMINE HYDROCHLORIDE 12.5 MG: 50 INJECTION, SOLUTION INTRAMUSCULAR; INTRAVENOUS at 09:16

## 2018-09-10 RX ADMIN — HYDROMORPHONE HYDROCHLORIDE 1 MG: 2 INJECTION, SOLUTION INTRAMUSCULAR; INTRAVENOUS; SUBCUTANEOUS at 03:07

## 2018-09-10 RX ADMIN — HYDROMORPHONE HYDROCHLORIDE 1 MG: 2 INJECTION, SOLUTION INTRAMUSCULAR; INTRAVENOUS; SUBCUTANEOUS at 16:23

## 2018-09-10 RX ADMIN — CEFTRIAXONE 1 G: 1 INJECTION, POWDER, FOR SOLUTION INTRAMUSCULAR; INTRAVENOUS at 20:59

## 2018-09-10 RX ADMIN — ENOXAPARIN SODIUM 40 MG: 40 INJECTION, SOLUTION INTRAVENOUS; SUBCUTANEOUS at 13:09

## 2018-09-10 RX ADMIN — DIPHENHYDRAMINE HYDROCHLORIDE 12.5 MG: 50 INJECTION, SOLUTION INTRAMUSCULAR; INTRAVENOUS at 06:05

## 2018-09-10 NOTE — PROGRESS NOTES
General Daily Progress Note Admit Date: 9/8/2018 Subjective:  
 
Patient continues to complain of pain. Current Facility-Administered Medications Medication Dose Route Frequency  enoxaparin (LOVENOX) injection 40 mg  40 mg SubCUTAneous Q24H  
 dextrose 5 % - 0.45% NaCl 1,000 mL with potassium chloride 20 mEq infusion   IntraVENous CONTINUOUS  
 sodium chloride (NS) flush 5-10 mL  5-10 mL IntraVENous Q8H  
 sodium chloride (NS) flush 5-10 mL  5-10 mL IntraVENous PRN  
 naloxone (NARCAN) injection 0.4 mg  0.4 mg IntraVENous PRN  
 diphenhydrAMINE (BENADRYL) injection 12.5 mg  12.5 mg IntraVENous Q4H PRN  
 ondansetron (ZOFRAN) injection 4 mg  4 mg IntraVENous Q4H PRN  
 bisacodyl (DULCOLAX) suppository 10 mg  10 mg Rectal DAILY PRN  
 HYDROmorphone (PF) (DILAUDID) injection 1 mg  1 mg IntraVENous Q3H PRN  prochlorperazine (COMPAZINE) with saline injection 10 mg  10 mg IntraVENous Q6H PRN Review of Systems A comprehensive review of systems was negative. Objective:  
 
Patient Vitals for the past 24 hrs: 
 BP Temp Pulse Resp SpO2  
09/10/18 0824 144/85 99 °F (37.2 °C) 84 16 100 % 09/10/18 0222 (!) 145/94 99.5 °F (37.5 °C) 80 16 100 % 09/10/18 0010 147/86 99 °F (37.2 °C) 79 16 100 % 09/09/18 2035 128/77 99.3 °F (37.4 °C) 78 16 100 % 09/09/18 1215 124/72 99.1 °F (37.3 °C) 76 16 99 % 09/10 0701 - 09/10 1900 In: -  
Out: 750 [Urine:750] 09/08 1901 - 09/10 0700 In: 5636.6 [P.O.:350; I.V.:5286.6] Out: 3200 [QNTKI:0751] Physical Exam:  
Visit Vitals  /85 (BP 1 Location: Left arm, BP Patient Position: At rest)  Pulse 84  Temp 99 °F (37.2 °C)  Resp 16  
 Ht 5' 11\" (1.803 m)  Wt 185 lb (83.9 kg)  SpO2 100%  BMI 25.8 kg/m2 General appearance: alert, cooperative, no distress, appears stated age Neck: supple, symmetrical, trachea midline, no adenopathy, thyroid: not enlarged, symmetric, no tenderness/mass/nodules, no carotid bruit and no JVD Lungs: clear to auscultation bilaterally Heart: regular rate and rhythm, S1, S2 normal, no murmur, click, rub or gallop Abdomen: soft, non-tender. Bowel sounds normal. No masses,  no organomegaly Extremities: extremities normal, atraumatic, no cyanosis or edema Neurologic: Grossly normal 
   
 
Data Review Recent Results (from the past 24 hour(s)) METABOLIC PANEL, COMPREHENSIVE Collection Time: 09/10/18  2:21 AM  
Result Value Ref Range Sodium 141 136 - 145 mmol/L Potassium 3.7 3.5 - 5.1 mmol/L Chloride 111 (H) 97 - 108 mmol/L  
 CO2 21 21 - 32 mmol/L Anion gap 9 5 - 15 mmol/L Glucose 76 65 - 100 mg/dL BUN 1 (L) 6 - 20 MG/DL Creatinine 0.65 0.55 - 1.02 MG/DL  
 BUN/Creatinine ratio 2 (L) 12 - 20 GFR est AA >60 >60 ml/min/1.73m2 GFR est non-AA >60 >60 ml/min/1.73m2 Calcium 7.9 (L) 8.5 - 10.1 MG/DL Bilirubin, total 1.5 (H) 0.2 - 1.0 MG/DL  
 ALT (SGPT) 14 12 - 78 U/L  
 AST (SGOT) 27 15 - 37 U/L Alk. phosphatase 77 45 - 117 U/L Protein, total 7.3 6.4 - 8.2 g/dL Albumin 3.0 (L) 3.5 - 5.0 g/dL Globulin 4.3 (H) 2.0 - 4.0 g/dL A-G Ratio 0.7 (L) 1.1 - 2.2 MAGNESIUM Collection Time: 09/10/18  2:21 AM  
Result Value Ref Range Magnesium 1.8 1.6 - 2.4 mg/dL CBC WITH AUTOMATED DIFF Collection Time: 09/10/18  2:21 AM  
Result Value Ref Range WBC 10.2 3.6 - 11.0 K/uL  
 RBC 2.29 (L) 3.80 - 5.20 M/uL HGB 7.6 (L) 11.5 - 16.0 g/dL HCT 22.3 (L) 35.0 - 47.0 % MCV 97.4 80.0 - 99.0 FL  
 MCH 33.2 26.0 - 34.0 PG  
 MCHC 34.1 30.0 - 36.5 g/dL  
 RDW 15.8 (H) 11.5 - 14.5 % PLATELET 029 436 - 210 K/uL MPV 11.9 8.9 - 12.9 FL  
 NRBC 0.4 (H) 0  WBC ABSOLUTE NRBC 0.04 (H) 0.00 - 0.01 K/uL NEUTROPHILS 37 32 - 75 % LYMPHOCYTES 49 12 - 49 % MONOCYTES 12 5 - 13 % EOSINOPHILS 2 0 - 7 % BASOPHILS 0 0 - 1 % IMMATURE GRANULOCYTES 0 0.0 - 0.5 % ABS. NEUTROPHILS 3.8 1.8 - 8.0 K/UL  
 ABS. LYMPHOCYTES 4.9 (H) 0.8 - 3.5 K/UL ABS. MONOCYTES 1.2 (H) 0.0 - 1.0 K/UL  
 ABS. EOSINOPHILS 0.2 0.0 - 0.4 K/UL  
 ABS. BASOPHILS 0.0 0.0 - 0.1 K/UL  
 ABS. IMM. GRANS. 0.0 0.00 - 0.04 K/UL  
 DF AUTOMATED Assessment:  
 
Principal Problem: 
  Sickle cell pain crisis ([facility] [address] (9/8/2018) Active Problems: Hypertension (10/3/2014) Depression (10/5/2014) Nausea and vomiting (7/15/2018) Plan: 1. Continue treatment of painful crisis which is thus far been uncomplicated. 2.  Hydration continues. 3.  Microscopic hematuria noted of undetermined etiology.

## 2018-09-10 NOTE — PROGRESS NOTES
Pharmacy Recommendation: 
Patient is here for Sickle cell crisis. Is on Hydromorphone 1 mg q3h prn and is utilizing it appropriately for pain. Her last bowel movement was on Friday. During rounds we discussed her BM and she only has BM every few days. She does take Miralax daily at home. In order to prevent constipation, please consider adding to her profile.  
Thank You, 
[address]

## 2018-09-10 NOTE — PROGRESS NOTES
MIDLINE Insertion Procedure  Note:  
 
Procedure explained to  pt  along with risks and benefits. Procedure teaching completed. Pre procedure assessment done. Maximum sterile barrier precautions observed throughout procedure. Lidocaine 1 %  3.0   ml sc injected to site prior to access the vein . Cannulated   brachial   vein using ultrasound guidance. Inserted  4   Fr. Single   lumen midline to   right    arm. Blood return verified and  flushed with 20ml normal saline  to  port. Sterile dressing applied with biopatch, statLock and occlusive dressing as per protocol. Curos cap applied to port. Patient tolerated procedure well with minimal blood loss. Reason for access : Reliable IV Access / limited vascular access / Sickle Cell crisis Complications related to insertion : None This midline to be removed on or before  10/9/2018 See nursing message  for midline reminders Inserted by : Elisa Garcia RN. DALJIT. CMSRN. PICC Nurse Assisted by : Jose Sharp RN PICC Nurse Total Length : 13  cm External Length :  0     cm Arm circumference :    29   cm Catheter occupies   13   % of vein. Type of Midline:  Bard Power Midline Ref#:  [id number] Lot#:   [id number] Expiration Date:    2019-11-30 Elisa BOCANEGRA. CMSRGARY. PICC nurse.  Vascular Access Team

## 2018-09-11 LAB
ALBUMIN SERPL-MCNC: 3.2 G/DL (ref 3.5–5)
ALBUMIN/GLOB SERPL: 0.7 {RATIO} (ref 1.1–2.2)
ALP SERPL-CCNC: 88 U/L (ref 45–117)
ALT SERPL-CCNC: 15 U/L (ref 12–78)
ANION GAP SERPL CALC-SCNC: 6 MMOL/L (ref 5–15)
AST SERPL-CCNC: 25 U/L (ref 15–37)
BACTERIA SPEC CULT: NORMAL
BASOPHILS # BLD: 0.1 K/UL (ref 0–0.1)
BASOPHILS NFR BLD: 1 % (ref 0–1)
BILIRUB SERPL-MCNC: 1.7 MG/DL (ref 0.2–1)
BLASTS NFR BLD MANUAL: 0 %
BUN SERPL-MCNC: <1 MG/DL (ref 6–20)
BUN/CREAT SERPL: ABNORMAL (ref 12–20)
CALCIUM SERPL-MCNC: 8.1 MG/DL (ref 8.5–10.1)
CC UR VC: NORMAL
CHLORIDE SERPL-SCNC: 106 MMOL/L (ref 97–108)
CO2 SERPL-SCNC: 26 MMOL/L (ref 21–32)
CREAT SERPL-MCNC: 0.72 MG/DL (ref 0.55–1.02)
DIFFERENTIAL METHOD BLD: ABNORMAL
EOSINOPHIL # BLD: 0.4 K/UL (ref 0–0.4)
EOSINOPHIL NFR BLD: 4 % (ref 0–7)
ERYTHROCYTE [DISTWIDTH] IN BLOOD BY AUTOMATED COUNT: 15.3 % (ref 11.5–14.5)
GLOBULIN SER CALC-MCNC: 4.7 G/DL (ref 2–4)
GLUCOSE SERPL-MCNC: 90 MG/DL (ref 65–100)
HCT VFR BLD AUTO: 23.3 % (ref 35–47)
HGB BLD-MCNC: 8.3 G/DL (ref 11.5–16)
IMM GRANULOCYTES # BLD: 0 K/UL
IMM GRANULOCYTES NFR BLD AUTO: 0 %
LYMPHOCYTES # BLD: 3 K/UL (ref 0.8–3.5)
LYMPHOCYTES NFR BLD: 31 % (ref 12–49)
MCH RBC QN AUTO: 34 PG (ref 26–34)
MCHC RBC AUTO-ENTMCNC: 35.6 G/DL (ref 30–36.5)
MCV RBC AUTO: 95.5 FL (ref 80–99)
METAMYELOCYTES NFR BLD MANUAL: 0 %
MONOCYTES # BLD: 0.3 K/UL (ref 0–1)
MONOCYTES NFR BLD: 3 % (ref 5–13)
MYELOCYTES NFR BLD MANUAL: 0 %
NEUTS BAND NFR BLD MANUAL: 0 % (ref 0–6)
NEUTS SEG # BLD: 5.9 K/UL (ref 1.8–8)
NEUTS SEG NFR BLD: 61 % (ref 32–75)
NRBC # BLD: 0.04 K/UL (ref 0–0.01)
NRBC BLD-RTO: 0.4 PER 100 WBC
OTHER CELLS NFR BLD MANUAL: 0 %
PLATELET # BLD AUTO: 298 K/UL (ref 150–400)
PMV BLD AUTO: 11.5 FL (ref 8.9–12.9)
POTASSIUM SERPL-SCNC: 3.5 MMOL/L (ref 3.5–5.1)
PROMYELOCYTES NFR BLD MANUAL: 0 %
PROT SERPL-MCNC: 7.9 G/DL (ref 6.4–8.2)
RBC # BLD AUTO: 2.44 M/UL (ref 3.8–5.2)
RBC MORPH BLD: ABNORMAL
RBC MORPH BLD: ABNORMAL
SERVICE CMNT-IMP: NORMAL
SODIUM SERPL-SCNC: 138 MMOL/L (ref 136–145)
WBC # BLD AUTO: 9.7 K/UL (ref 3.6–11)

## 2018-09-11 PROCEDURE — 74011250636 HC RX REV CODE- 250/636: Performed by: INTERNAL MEDICINE

## 2018-09-11 PROCEDURE — 85027 COMPLETE CBC AUTOMATED: CPT | Performed by: INTERNAL MEDICINE

## 2018-09-11 PROCEDURE — 94760 N-INVAS EAR/PLS OXIMETRY 1: CPT

## 2018-09-11 PROCEDURE — 74011000250 HC RX REV CODE- 250: Performed by: INTERNAL MEDICINE

## 2018-09-11 PROCEDURE — 74011250637 HC RX REV CODE- 250/637: Performed by: INTERNAL MEDICINE

## 2018-09-11 PROCEDURE — 36415 COLL VENOUS BLD VENIPUNCTURE: CPT | Performed by: INTERNAL MEDICINE

## 2018-09-11 PROCEDURE — 77010033678 HC OXYGEN DAILY

## 2018-09-11 PROCEDURE — 80053 COMPREHEN METABOLIC PANEL: CPT | Performed by: INTERNAL MEDICINE

## 2018-09-11 PROCEDURE — 74011000258 HC RX REV CODE- 258: Performed by: INTERNAL MEDICINE

## 2018-09-11 PROCEDURE — 65270000029 HC RM PRIVATE

## 2018-09-11 RX ORDER — FOLIC ACID 1 MG/1
1 TABLET ORAL DAILY
Status: DISCONTINUED | OUTPATIENT
Start: 2018-09-11 | End: 2018-09-20 | Stop reason: HOSPADM

## 2018-09-11 RX ORDER — CITALOPRAM 20 MG/1
10 TABLET, FILM COATED ORAL DAILY
Status: DISCONTINUED | OUTPATIENT
Start: 2018-09-11 | End: 2018-09-20 | Stop reason: HOSPADM

## 2018-09-11 RX ORDER — POLYETHYLENE GLYCOL 3350 17 G/17G
17 POWDER, FOR SOLUTION ORAL DAILY
Status: DISCONTINUED | OUTPATIENT
Start: 2018-09-11 | End: 2018-09-20 | Stop reason: HOSPADM

## 2018-09-11 RX ADMIN — DIPHENHYDRAMINE HYDROCHLORIDE 12.5 MG: 50 INJECTION, SOLUTION INTRAMUSCULAR; INTRAVENOUS at 21:24

## 2018-09-11 RX ADMIN — ENOXAPARIN SODIUM 40 MG: 40 INJECTION, SOLUTION INTRAVENOUS; SUBCUTANEOUS at 14:41

## 2018-09-11 RX ADMIN — HYDROMORPHONE HYDROCHLORIDE 1 MG: 2 INJECTION, SOLUTION INTRAMUSCULAR; INTRAVENOUS; SUBCUTANEOUS at 01:14

## 2018-09-11 RX ADMIN — ONDANSETRON 4 MG: 2 INJECTION INTRAMUSCULAR; INTRAVENOUS at 01:00

## 2018-09-11 RX ADMIN — ONDANSETRON 4 MG: 2 INJECTION INTRAMUSCULAR; INTRAVENOUS at 11:31

## 2018-09-11 RX ADMIN — Medication 10 ML: at 05:18

## 2018-09-11 RX ADMIN — FOLIC ACID 1 MG: 1 TABLET ORAL at 11:31

## 2018-09-11 RX ADMIN — HYDROMORPHONE HYDROCHLORIDE 1 MG: 2 INJECTION, SOLUTION INTRAMUSCULAR; INTRAVENOUS; SUBCUTANEOUS at 13:26

## 2018-09-11 RX ADMIN — POTASSIUM CHLORIDE: 149 INJECTION, SOLUTION, CONCENTRATE INTRAVENOUS at 11:21

## 2018-09-11 RX ADMIN — HYDROMORPHONE HYDROCHLORIDE 1 MG: 2 INJECTION, SOLUTION INTRAMUSCULAR; INTRAVENOUS; SUBCUTANEOUS at 17:59

## 2018-09-11 RX ADMIN — PROCHLORPERAZINE EDISYLATE 10 MG: 5 INJECTION INTRAMUSCULAR; INTRAVENOUS at 17:59

## 2018-09-11 RX ADMIN — ONDANSETRON 4 MG: 2 INJECTION INTRAMUSCULAR; INTRAVENOUS at 20:37

## 2018-09-11 RX ADMIN — HYDROMORPHONE HYDROCHLORIDE 1 MG: 2 INJECTION, SOLUTION INTRAMUSCULAR; INTRAVENOUS; SUBCUTANEOUS at 21:25

## 2018-09-11 RX ADMIN — Medication 10 ML: at 23:02

## 2018-09-11 RX ADMIN — PROCHLORPERAZINE EDISYLATE 10 MG: 5 INJECTION INTRAMUSCULAR; INTRAVENOUS at 01:11

## 2018-09-11 RX ADMIN — POLYETHYLENE GLYCOL 3350 17 G: 17 POWDER, FOR SOLUTION ORAL at 11:37

## 2018-09-11 RX ADMIN — HYDROMORPHONE HYDROCHLORIDE 1 MG: 2 INJECTION, SOLUTION INTRAMUSCULAR; INTRAVENOUS; SUBCUTANEOUS at 08:31

## 2018-09-11 RX ADMIN — HYDROMORPHONE HYDROCHLORIDE 1 MG: 2 INJECTION, SOLUTION INTRAMUSCULAR; INTRAVENOUS; SUBCUTANEOUS at 04:19

## 2018-09-11 RX ADMIN — DIPHENHYDRAMINE HYDROCHLORIDE 12.5 MG: 50 INJECTION, SOLUTION INTRAMUSCULAR; INTRAVENOUS at 05:18

## 2018-09-11 RX ADMIN — DEXTROSE MONOHYDRATE, SODIUM CHLORIDE, AND POTASSIUM CHLORIDE 125 ML/HR: 50; 4.5; 1.49 INJECTION, SOLUTION INTRAVENOUS at 02:56

## 2018-09-11 RX ADMIN — ACETAMINOPHEN 650 MG: 325 TABLET ORAL at 01:11

## 2018-09-11 RX ADMIN — Medication 10 ML: at 14:41

## 2018-09-11 RX ADMIN — CITALOPRAM HYDROBROMIDE 10 MG: 20 TABLET ORAL at 11:31

## 2018-09-11 RX ADMIN — DIPHENHYDRAMINE HYDROCHLORIDE 12.5 MG: 50 INJECTION, SOLUTION INTRAMUSCULAR; INTRAVENOUS at 01:00

## 2018-09-11 RX ADMIN — CEFTRIAXONE 1 G: 1 INJECTION, POWDER, FOR SOLUTION INTRAMUSCULAR; INTRAVENOUS at 21:25

## 2018-09-11 RX ADMIN — PROCHLORPERAZINE EDISYLATE 10 MG: 5 INJECTION INTRAMUSCULAR; INTRAVENOUS at 08:31

## 2018-09-11 RX ADMIN — ONDANSETRON 4 MG: 2 INJECTION INTRAMUSCULAR; INTRAVENOUS at 05:18

## 2018-09-11 RX ADMIN — Medication 10 ML: at 22:00

## 2018-09-11 RX ADMIN — DIPHENHYDRAMINE HYDROCHLORIDE 12.5 MG: 50 INJECTION, SOLUTION INTRAMUSCULAR; INTRAVENOUS at 11:31

## 2018-09-11 RX ADMIN — POTASSIUM CHLORIDE: 149 INJECTION, SOLUTION, CONCENTRATE INTRAVENOUS at 20:00

## 2018-09-11 NOTE — PROGRESS NOTES
General Daily Progress Note Admit Date: 9/8/2018 Subjective:  
 
Patient continues to complain of pain. Current Facility-Administered Medications Medication Dose Route Frequency  citalopram (CELEXA) 10 mg/5 mL oral solution 10 mg  10 mg Oral DAILY  folic acid (FOLVITE) tablet 1 mg  1 mg Oral DAILY  polyethylene glycol (MIRALAX) packet 17 g  17 g Oral DAILY  dextrose 5 % - 0.45% NaCl 1,000 mL with potassium chloride 30 mEq infusion   IntraVENous CONTINUOUS  
 enoxaparin (LOVENOX) injection 40 mg  40 mg SubCUTAneous Q24H  
 saline peripheral flush soln 10 mL  10 mL InterCATHeter PRN  
 SALINE PERIPHERAL FLUSH Q8H soln 10 mL  10 mL InterCATHeter Q8H  
 acetaminophen (TYLENOL) tablet 650 mg  650 mg Oral Q4H PRN  
 cefTRIAXone (ROCEPHIN) 1 g in 0.9% sodium chloride (MBP/ADV) 50 mL  1 g IntraVENous Q24H  
 sodium chloride (NS) flush 5-10 mL  5-10 mL IntraVENous Q8H  
 sodium chloride (NS) flush 5-10 mL  5-10 mL IntraVENous PRN  
 naloxone (NARCAN) injection 0.4 mg  0.4 mg IntraVENous PRN  
 diphenhydrAMINE (BENADRYL) injection 12.5 mg  12.5 mg IntraVENous Q4H PRN  
 ondansetron (ZOFRAN) injection 4 mg  4 mg IntraVENous Q4H PRN  
 bisacodyl (DULCOLAX) suppository 10 mg  10 mg Rectal DAILY PRN  
 HYDROmorphone (PF) (DILAUDID) injection 1 mg  1 mg IntraVENous Q3H PRN  prochlorperazine (COMPAZINE) with saline injection 10 mg  10 mg IntraVENous Q6H PRN Review of Systems A comprehensive review of systems was negative. Objective:  
 
Patient Vitals for the past 24 hrs: 
 BP Temp Pulse Resp SpO2  
09/11/18 0801 124/76 99.5 °F (37.5 °C) 82 16 100 % 09/11/18 0331 123/72 99.2 °F (37.3 °C) 81 16 100 % 09/11/18 0100 - (!) 100.8 °F (38.2 °C) - - -  
09/10/18 2313 - (!) 101.3 °F (38.5 °C) - - -  
09/10/18 2218 133/75 (!) 102.8 °F (39.3 °C) 94 18 100 % 09/10/18 2153 - (!) 102.9 °F (39.4 °C) - - -  
09/10/18 1945 146/71 (!) 101.9 °F (38.8 °C) 91 16 100 % 09/10/18 1706 157/89 - 86 - -  
09/10/18 1631 (!) 159/94 - - - -  
09/10/18 1625 (!) 177/95 99.7 °F (37.6 °C) 90 16 100 % 09/10/18 1212 131/64 99 °F (37.2 °C) 77 16 100 % 09/09 1901 - 09/11 0700 In: 5364.2 [P.O.:1460; I.V.:3904.2] Out: [address] [[id number]] Physical Exam:  
Visit Vitals  /76 (BP 1 Location: Left arm, BP Patient Position: Lying right side)  Pulse 82  Temp 99.5 °F (37.5 °C)  Resp 16  
 Ht 5' 11\" (1.803 m)  Wt 185 lb (83.9 kg)  SpO2 100%  BMI 25.8 kg/m2 General appearance: alert, cooperative, no distress, appears stated age Neck: supple, symmetrical, trachea midline, no adenopathy, thyroid: not enlarged, symmetric, no tenderness/mass/nodules, no carotid bruit and no JVD Lungs: clear to auscultation bilaterally Heart: regular rate and rhythm, S1, S2 normal, no murmur, click, rub or gallop Abdomen: soft, non-tender. Bowel sounds normal. No masses,  no organomegaly Extremities: extremities normal, atraumatic, no cyanosis or edema Data Review Recent Results (from the past 24 hour(s)) URINALYSIS W/MICROSCOPIC Collection Time: 09/10/18 10:37 AM  
Result Value Ref Range Color YELLOW/STRAW Appearance CLEAR CLEAR Specific gravity <1.005 1.003 - 1.030  
 pH (UA) 6.5 5.0 - 8.0 Protein NEGATIVE  NEG mg/dL Glucose NEGATIVE  NEG mg/dL Ketone NEGATIVE  NEG mg/dL Bilirubin NEGATIVE  NEG Blood NEGATIVE  NEG Urobilinogen 4.0 (H) 0.2 - 1.0 EU/dL Nitrites NEGATIVE  NEG Leukocyte Esterase NEGATIVE  NEG    
 WBC 0-4 0 - 4 /hpf  
 RBC 0-5 0 - 5 /hpf Epithelial cells FEW FEW /lpf Bacteria NEGATIVE  NEG /hpf Hyaline cast 0-2 0 - 5 /lpf  
CBC WITH MANUAL DIFF Collection Time: 09/11/18  1:24 AM  
Result Value Ref Range WBC 9.7 3.6 - 11.0 K/uL  
 RBC 2.44 (L) 3.80 - 5.20 M/uL HGB 8.3 (L) 11.5 - 16.0 g/dL HCT 23.3 (L) 35.0 - 47.0 %  MCV 95.5 80.0 - 99.0 FL  
 MCH 34.0 26.0 - 34.0 PG  
 MCHC 35.6 30.0 - 36.5 g/dL  
 RDW 15.3 (H) 11.5 - 14.5 % PLATELET 464 584 - 647 K/uL MPV 11.5 8.9 - 12.9 FL  
 NRBC 0.4 (H) 0  WBC ABSOLUTE NRBC 0.04 (H) 0.00 - 0.01 K/uL NEUTROPHILS 61 32 - 75 % BAND NEUTROPHILS 0 0 - 6 % LYMPHOCYTES 31 12 - 49 % MONOCYTES 3 (L) 5 - 13 % EOSINOPHILS 4 0 - 7 % BASOPHILS 1 0 - 1 % METAMYELOCYTES 0 0 % MYELOCYTES 0 0 % PROMYELOCYTES 0 0 % BLASTS 0 0 % OTHER CELL 0 0 IMMATURE GRANULOCYTES 0 %  
 ABS. NEUTROPHILS 5.9 1.8 - 8.0 K/UL  
 ABS. LYMPHOCYTES 3.0 0.8 - 3.5 K/UL  
 ABS. MONOCYTES 0.3 0.0 - 1.0 K/UL  
 ABS. EOSINOPHILS 0.4 0.0 - 0.4 K/UL  
 ABS. BASOPHILS 0.1 0.0 - 0.1 K/UL  
 ABS. IMM. GRANS. 0.0 K/UL  
 DF MANUAL    
 RBC COMMENTS TARGET CELLS 2+ 
    
 RBC COMMENTS ANISOCYTOSIS 
1+ METABOLIC PANEL, COMPREHENSIVE Collection Time: 09/11/18  1:24 AM  
Result Value Ref Range Sodium 138 136 - 145 mmol/L Potassium 3.5 3.5 - 5.1 mmol/L Chloride 106 97 - 108 mmol/L  
 CO2 26 21 - 32 mmol/L Anion gap 6 5 - 15 mmol/L Glucose 90 65 - 100 mg/dL BUN <1 (L) 6 - 20 MG/DL Creatinine 0.72 0.55 - 1.02 MG/DL  
 BUN/Creatinine ratio Cannot be calculated 12 - 20 GFR est AA >60 >60 ml/min/1.73m2 GFR est non-AA >60 >60 ml/min/1.73m2 Calcium 8.1 (L) 8.5 - 10.1 MG/DL Bilirubin, total 1.7 (H) 0.2 - 1.0 MG/DL  
 ALT (SGPT) 15 12 - 78 U/L  
 AST (SGOT) 25 15 - 37 U/L Alk. phosphatase 88 45 - 117 U/L Protein, total 7.9 6.4 - 8.2 g/dL Albumin 3.2 (L) 3.5 - 5.0 g/dL Globulin 4.7 (H) 2.0 - 4.0 g/dL A-G Ratio 0.7 (L) 1.1 - 2.2 Assessment:  
 
Principal Problem: 
  Sickle cell pain crisis ([facility] [address] (9/8/2018) Active Problems: Hypertension (10/3/2014) Depression (10/5/2014) Nausea and vomiting (7/15/2018) Plan: 1. Continue treatment of painful crises. 2.  GI aspect continues to improve therefore diet progression will occur. 3.  Will mobilize.

## 2018-09-11 NOTE — PROGRESS NOTES
7:30 PM Bedside report received from [address]. 8:20 PM Spoke with Dr. Tim Hooker regarding temp 101.9 (MEWS = 3). Orders received for stat portable CXR, 650 mg PO tylenol Q4 PRN, and 1 gm rocephin IV Q24. See MAR. Will f/u and continue to monitor closely.

## 2018-09-11 NOTE — PROGRESS NOTES
General Surgery End of Shift Nursing Note Bedside shift change report given to [address] McCulloch (oncoming nurse) by Ha Louie (offgoing nurse). Report included the following information SBAR, Kardex, Intake/Output, MAR and Accordion. Shift worked:   7 am - 7pm  
Summary of shift:    Pt remained on 2 L O2 throughout shift. Midline placed by PICC team. Pain and nausea treated with IV meds. Pt tolerated fluids well. Issues for physician to address:   None Number times ambulated in hallway past shift: 0 Number of times OOB to chair past shift: 0 Pain Management: 
Current medication: See [facility] Patient states pain is manageable on current pain medication: YES 
 
GI: 
 
Current diet: Tolerating current diet: YES Passing flatus: YES Last Bowel Movement: several days ago Appearance:  
 
Respiratory: 
 
Incentive Spirometer at bedside: YES Patient instructed on use: YES Patient Safety: 
 
Falls Score: 1 Bed Alarm On? Not applicable Sitter? Not applicable Lucien Arshad

## 2018-09-11 NOTE — PROGRESS NOTES
09/10/18 1945 Vitals Temp (!) 101.9 °F (38.8 °C) (notified RN) Temp Source Oral  
Pulse (Heart Rate) 91 Heart Rate Source Monitor Resp Rate 16  
O2 Sat (%) 100 % Level of Consciousness Alert /71 MAP (Calculated) 96 BP 1 Location Left arm BP 1 Method Automatic  
BP Patient Position At rest  
Cardiac Rhythm N/A (comment) (non tele) MEWS Score 3 Pain 1 Pain Scale 1 Numeric (0 - 10) Pain Intensity 1 4 Patient Stated Pain Goal 4 Pain Reassessment 1 Yes POSS Scale Opioid Sedation Scale 1 Patient Observation Activity In bed;Resting quietly MEWS = 3 d/t elevated temp. Paged Dr. Stella Galo for tylenol order. Awaiting return call.

## 2018-09-11 NOTE — PROGRESS NOTES
Problem: Falls - Risk of 
Goal: *Absence of Falls Document Ovidio Carranza Fall Risk and appropriate interventions in the flowsheet. Outcome: Progressing Towards Goal 
Fall Risk Interventions: 
  
 
  
 
Medication Interventions: Patient to call before getting OOB Elimination Interventions: Call light in reach Comments: Oriented O2 Ammbulates/ gen weakness IVF infusing Midline 
 
---------------- Benadryl Dilaudid Compazine Zofran Sickle Cell Crisis

## 2018-09-12 LAB
ANION GAP SERPL CALC-SCNC: 5 MMOL/L (ref 5–15)
BASOPHILS # BLD: 0 K/UL (ref 0–0.1)
BASOPHILS NFR BLD: 0 % (ref 0–1)
BLASTS NFR BLD MANUAL: 0 %
BUN SERPL-MCNC: 2 MG/DL (ref 6–20)
BUN/CREAT SERPL: 3 (ref 12–20)
CALCIUM SERPL-MCNC: 7.9 MG/DL (ref 8.5–10.1)
CHLORIDE SERPL-SCNC: 106 MMOL/L (ref 97–108)
CO2 SERPL-SCNC: 29 MMOL/L (ref 21–32)
CREAT SERPL-MCNC: 0.63 MG/DL (ref 0.55–1.02)
DIFFERENTIAL METHOD BLD: ABNORMAL
EOSINOPHIL # BLD: 0.3 K/UL (ref 0–0.4)
EOSINOPHIL NFR BLD: 3 % (ref 0–7)
ERYTHROCYTE [DISTWIDTH] IN BLOOD BY AUTOMATED COUNT: 15.3 % (ref 11.5–14.5)
GLUCOSE SERPL-MCNC: 90 MG/DL (ref 65–100)
HCT VFR BLD AUTO: 21.5 % (ref 35–47)
HGB BLD-MCNC: 7.5 G/DL (ref 11.5–16)
IMM GRANULOCYTES # BLD: 0 K/UL
IMM GRANULOCYTES NFR BLD AUTO: 0 %
LYMPHOCYTES # BLD: 2.8 K/UL (ref 0.8–3.5)
LYMPHOCYTES NFR BLD: 33 % (ref 12–49)
MCH RBC QN AUTO: 33.5 PG (ref 26–34)
MCHC RBC AUTO-ENTMCNC: 34.9 G/DL (ref 30–36.5)
MCV RBC AUTO: 96 FL (ref 80–99)
METAMYELOCYTES NFR BLD MANUAL: 0 %
MONOCYTES # BLD: 0.8 K/UL (ref 0–1)
MONOCYTES NFR BLD: 9 % (ref 5–13)
MYELOCYTES NFR BLD MANUAL: 0 %
NEUTS BAND NFR BLD MANUAL: 0 % (ref 0–6)
NEUTS SEG # BLD: 4.7 K/UL (ref 1.8–8)
NEUTS SEG NFR BLD: 55 % (ref 32–75)
NRBC # BLD: 0.02 K/UL (ref 0–0.01)
NRBC BLD-RTO: 0.2 PER 100 WBC
OTHER CELLS NFR BLD MANUAL: 0 %
PLATELET # BLD AUTO: 267 K/UL (ref 150–400)
PLATELET COMMENTS,PCOM: ABNORMAL
PMV BLD AUTO: 12.5 FL (ref 8.9–12.9)
POTASSIUM SERPL-SCNC: 3.7 MMOL/L (ref 3.5–5.1)
PROMYELOCYTES NFR BLD MANUAL: 0 %
RBC # BLD AUTO: 2.24 M/UL (ref 3.8–5.2)
RBC MORPH BLD: ABNORMAL
SODIUM SERPL-SCNC: 140 MMOL/L (ref 136–145)
WBC # BLD AUTO: 8.6 K/UL (ref 3.6–11)

## 2018-09-12 PROCEDURE — 85027 COMPLETE CBC AUTOMATED: CPT | Performed by: INTERNAL MEDICINE

## 2018-09-12 PROCEDURE — 65270000029 HC RM PRIVATE

## 2018-09-12 PROCEDURE — 36415 COLL VENOUS BLD VENIPUNCTURE: CPT | Performed by: INTERNAL MEDICINE

## 2018-09-12 PROCEDURE — 74011000258 HC RX REV CODE- 258: Performed by: INTERNAL MEDICINE

## 2018-09-12 PROCEDURE — 74011000250 HC RX REV CODE- 250: Performed by: INTERNAL MEDICINE

## 2018-09-12 PROCEDURE — 74011250636 HC RX REV CODE- 250/636: Performed by: INTERNAL MEDICINE

## 2018-09-12 PROCEDURE — 74011000250 HC RX REV CODE- 250

## 2018-09-12 PROCEDURE — 74011250637 HC RX REV CODE- 250/637: Performed by: INTERNAL MEDICINE

## 2018-09-12 PROCEDURE — 94760 N-INVAS EAR/PLS OXIMETRY 1: CPT

## 2018-09-12 PROCEDURE — 77010033678 HC OXYGEN DAILY

## 2018-09-12 PROCEDURE — 80048 BASIC METABOLIC PNL TOTAL CA: CPT | Performed by: INTERNAL MEDICINE

## 2018-09-12 RX ORDER — SODIUM CHLORIDE 9 MG/ML
INJECTION INTRAMUSCULAR; INTRAVENOUS; SUBCUTANEOUS
Status: DISPENSED
Start: 2018-09-12 | End: 2018-09-13

## 2018-09-12 RX ORDER — SODIUM CHLORIDE 9 MG/ML
INJECTION INTRAMUSCULAR; INTRAVENOUS; SUBCUTANEOUS
Status: COMPLETED
Start: 2018-09-12 | End: 2018-09-12

## 2018-09-12 RX ORDER — PANTOPRAZOLE SODIUM 40 MG/1
40 TABLET, DELAYED RELEASE ORAL
Status: DISCONTINUED | OUTPATIENT
Start: 2018-09-12 | End: 2018-09-20 | Stop reason: HOSPADM

## 2018-09-12 RX ADMIN — ONDANSETRON 4 MG: 2 INJECTION INTRAMUSCULAR; INTRAVENOUS at 07:55

## 2018-09-12 RX ADMIN — Medication 10 ML: at 17:33

## 2018-09-12 RX ADMIN — CEFTRIAXONE 1 G: 1 INJECTION, POWDER, FOR SOLUTION INTRAMUSCULAR; INTRAVENOUS at 20:43

## 2018-09-12 RX ADMIN — Medication 10 ML: at 05:13

## 2018-09-12 RX ADMIN — POTASSIUM CHLORIDE: 149 INJECTION, SOLUTION, CONCENTRATE INTRAVENOUS at 19:19

## 2018-09-12 RX ADMIN — HYDROMORPHONE HYDROCHLORIDE 1 MG: 2 INJECTION, SOLUTION INTRAMUSCULAR; INTRAVENOUS; SUBCUTANEOUS at 10:58

## 2018-09-12 RX ADMIN — PROCHLORPERAZINE EDISYLATE 10 MG: 5 INJECTION INTRAMUSCULAR; INTRAVENOUS at 10:52

## 2018-09-12 RX ADMIN — ONDANSETRON 4 MG: 2 INJECTION INTRAMUSCULAR; INTRAVENOUS at 14:24

## 2018-09-12 RX ADMIN — HYDROMORPHONE HYDROCHLORIDE 1 MG: 2 INJECTION, SOLUTION INTRAMUSCULAR; INTRAVENOUS; SUBCUTANEOUS at 14:33

## 2018-09-12 RX ADMIN — POTASSIUM CHLORIDE: 149 INJECTION, SOLUTION, CONCENTRATE INTRAVENOUS at 19:20

## 2018-09-12 RX ADMIN — HYDROMORPHONE HYDROCHLORIDE 1 MG: 2 INJECTION, SOLUTION INTRAMUSCULAR; INTRAVENOUS; SUBCUTANEOUS at 07:49

## 2018-09-12 RX ADMIN — Medication 10 ML: at 05:12

## 2018-09-12 RX ADMIN — ENOXAPARIN SODIUM 40 MG: 40 INJECTION, SOLUTION INTRAVENOUS; SUBCUTANEOUS at 17:31

## 2018-09-12 RX ADMIN — HYDROMORPHONE HYDROCHLORIDE 1 MG: 2 INJECTION, SOLUTION INTRAMUSCULAR; INTRAVENOUS; SUBCUTANEOUS at 01:00

## 2018-09-12 RX ADMIN — CITALOPRAM HYDROBROMIDE 10 MG: 20 TABLET ORAL at 10:52

## 2018-09-12 RX ADMIN — FOLIC ACID 1 MG: 1 TABLET ORAL at 10:52

## 2018-09-12 RX ADMIN — ONDANSETRON 4 MG: 2 INJECTION INTRAMUSCULAR; INTRAVENOUS at 20:42

## 2018-09-12 RX ADMIN — PANTOPRAZOLE SODIUM 40 MG: 40 TABLET, DELAYED RELEASE ORAL at 17:31

## 2018-09-12 RX ADMIN — PROCHLORPERAZINE EDISYLATE 10 MG: 5 INJECTION INTRAMUSCULAR; INTRAVENOUS at 19:17

## 2018-09-12 RX ADMIN — DIPHENHYDRAMINE HYDROCHLORIDE 12.5 MG: 50 INJECTION, SOLUTION INTRAMUSCULAR; INTRAVENOUS at 14:24

## 2018-09-12 RX ADMIN — Medication 10 ML: at 14:33

## 2018-09-12 RX ADMIN — DIPHENHYDRAMINE HYDROCHLORIDE 12.5 MG: 50 INJECTION, SOLUTION INTRAMUSCULAR; INTRAVENOUS at 20:42

## 2018-09-12 RX ADMIN — PROCHLORPERAZINE EDISYLATE 10 MG: 5 INJECTION INTRAMUSCULAR; INTRAVENOUS at 01:01

## 2018-09-12 RX ADMIN — HYDROMORPHONE HYDROCHLORIDE 1 MG: 2 INJECTION, SOLUTION INTRAMUSCULAR; INTRAVENOUS; SUBCUTANEOUS at 22:11

## 2018-09-12 RX ADMIN — HYDROMORPHONE HYDROCHLORIDE 1 MG: 2 INJECTION, SOLUTION INTRAMUSCULAR; INTRAVENOUS; SUBCUTANEOUS at 19:17

## 2018-09-12 RX ADMIN — Medication 10 ML: at 07:49

## 2018-09-12 RX ADMIN — SODIUM CHLORIDE 10 ML: 9 INJECTION, SOLUTION INTRAMUSCULAR; INTRAVENOUS; SUBCUTANEOUS at 14:33

## 2018-09-12 RX ADMIN — DIPHENHYDRAMINE HYDROCHLORIDE 12.5 MG: 50 INJECTION, SOLUTION INTRAMUSCULAR; INTRAVENOUS at 07:52

## 2018-09-12 RX ADMIN — POTASSIUM CHLORIDE: 149 INJECTION, SOLUTION, CONCENTRATE INTRAVENOUS at 03:38

## 2018-09-12 NOTE — PROGRESS NOTES
Reason for Admission:   Sickle cell crisis RRAT Score:     14 Do you (patient/family) have any concerns for transition/discharge? No concerns at this time Plan for utilizing home health:     Pt has not used HH, DME,SNF/Rehab in past. She is independent with ADL's and IADL's prior to admission. Pt does not feel services are warranted at this time. CM will continue to follow for discharge needs. Likelihood of readmission?   moderate Transition of Care Plan:      Patient is a 56year old female. CM met with patient, introduced self and role. Pt alert/oriented, pain manageable. Demographics verified. Preferred pharmacy is [facility]. Pt brother or daughter will transport her to her home at discharge by car. Pt will drive self to follow up appt or family can assist if needed. Pt lives in a 1 story home with 3 steps into entrance. Daughter living with her at this time. Care Management Interventions PCP Verified by CM: Yes (sees dr Vadim Ward every 3 months and prn) Mode of Transport at Discharge: Other (see comment) (brother or daughter will transport to home by car at discharge) Transition of Care Consult (CM Consult): Discharge Planning Discharge Durable Medical Equipment: No (none) Physical Therapy Consult: No 
Occupational Therapy Consult: No 
Speech Therapy Consult: No 
Current Support Network: Own Home (lives in a 1 story home with 3 steps into entrance, daughter lives with her at  this time) Confirm Follow Up Transport: Self (will drive self to her follow up appt or family will assist) Plan discussed with Pt/Family/Caregiver: Yes ABBI Shaver, RN Care Manager [facility] 
[phone]

## 2018-09-12 NOTE — PROGRESS NOTES
General Daily Progress Note Admit Date: 9/8/2018 Subjective:  
 
Patient has no complaint . Current Facility-Administered Medications Medication Dose Route Frequency  citalopram (CELEXA) tablet 10 mg  10 mg Oral DAILY  folic acid (FOLVITE) tablet 1 mg  1 mg Oral DAILY  polyethylene glycol (MIRALAX) packet 17 g  17 g Oral DAILY  dextrose 5 % - 0.45% NaCl 1,000 mL with potassium chloride 30 mEq infusion   IntraVENous CONTINUOUS  
 enoxaparin (LOVENOX) injection 40 mg  40 mg SubCUTAneous Q24H  
 saline peripheral flush soln 10 mL  10 mL InterCATHeter PRN  
 SALINE PERIPHERAL FLUSH Q8H soln 10 mL  10 mL InterCATHeter Q8H  
 acetaminophen (TYLENOL) tablet 650 mg  650 mg Oral Q4H PRN  
 cefTRIAXone (ROCEPHIN) 1 g in 0.9% sodium chloride (MBP/ADV) 50 mL  1 g IntraVENous Q24H  
 sodium chloride (NS) flush 5-10 mL  5-10 mL IntraVENous Q8H  
 sodium chloride (NS) flush 5-10 mL  5-10 mL IntraVENous PRN  
 naloxone (NARCAN) injection 0.4 mg  0.4 mg IntraVENous PRN  
 diphenhydrAMINE (BENADRYL) injection 12.5 mg  12.5 mg IntraVENous Q4H PRN  
 ondansetron (ZOFRAN) injection 4 mg  4 mg IntraVENous Q4H PRN  
 bisacodyl (DULCOLAX) suppository 10 mg  10 mg Rectal DAILY PRN  
 HYDROmorphone (PF) (DILAUDID) injection 1 mg  1 mg IntraVENous Q3H PRN  prochlorperazine (COMPAZINE) with saline injection 10 mg  10 mg IntraVENous Q6H PRN Review of Systems A comprehensive review of systems was negative. Objective:  
 
Patient Vitals for the past 24 hrs: 
 BP Temp Pulse Resp SpO2  
09/12/18 0317 126/69 99.4 °F (37.4 °C) 84 16 100 % 09/11/18 2342 135/76 99.5 °F (37.5 °C) 87 16 100 % 09/11/18 2032 158/80 (!) 100.6 °F (38.1 °C) 92 16 100 % 09/11/18 1526 124/69 99.2 °F (37.3 °C) 82 16 100 % 09/11/18 1212 121/65 98.9 °F (37.2 °C) 83 16 100 % 09/12 0701 - 09/12 1900 In: 2058 [I.V.:2058] Out: -  
09/10 1901 - 09/12 0700 In: 6732.4 [P.O.:1120; I.V.:5612.4] Out: 1400 [Urine:700] Physical Exam:  
Visit Vitals  /69 (BP 1 Location: Left arm, BP Patient Position: At rest)  Pulse 84  Temp 99.4 °F (37.4 °C)  Resp 16  
 Ht 5' 11\" (1.803 m)  Wt 185 lb (83.9 kg)  SpO2 100%  BMI 25.8 kg/m2 General appearance: alert, cooperative, no distress, appears stated age Neck: supple, symmetrical, trachea midline, no adenopathy, thyroid: not enlarged, symmetric, no tenderness/mass/nodules, no carotid bruit and no JVD Lungs: clear to auscultation bilaterally Heart: regular rate and rhythm, S1, S2 normal, no murmur, click, rub or gallop Abdomen: soft, non-tender. Bowel sounds normal. No masses,  no organomegaly Extremities: extremities normal, atraumatic, no cyanosis or edema Neurologic: Grossly normal 
   
 
Data Review Recent Results (from the past 24 hour(s)) METABOLIC PANEL, BASIC Collection Time: 09/12/18  3:21 AM  
Result Value Ref Range Sodium 140 136 - 145 mmol/L Potassium 3.7 3.5 - 5.1 mmol/L Chloride 106 97 - 108 mmol/L  
 CO2 29 21 - 32 mmol/L Anion gap 5 5 - 15 mmol/L Glucose 90 65 - 100 mg/dL BUN 2 (L) 6 - 20 MG/DL Creatinine 0.63 0.55 - 1.02 MG/DL  
 BUN/Creatinine ratio 3 (L) 12 - 20 GFR est AA >60 >60 ml/min/1.73m2 GFR est non-AA >60 >60 ml/min/1.73m2 Calcium 7.9 (L) 8.5 - 10.1 MG/DL  
CBC WITH MANUAL DIFF Collection Time: 09/12/18  3:21 AM  
Result Value Ref Range WBC 8.6 3.6 - 11.0 K/uL  
 RBC 2.24 (L) 3.80 - 5.20 M/uL HGB 7.5 (L) 11.5 - 16.0 g/dL HCT 21.5 (L) 35.0 - 47.0 % MCV 96.0 80.0 - 99.0 FL  
 MCH 33.5 26.0 - 34.0 PG  
 MCHC 34.9 30.0 - 36.5 g/dL  
 RDW 15.3 (H) 11.5 - 14.5 % PLATELET 310 248 - 189 K/uL MPV 12.5 8.9 - 12.9 FL  
 NRBC 0.2 (H) 0  WBC ABSOLUTE NRBC 0.02 (H) 0.00 - 0.01 K/uL NEUTROPHILS 55 32 - 75 % BAND NEUTROPHILS 0 0 - 6 % LYMPHOCYTES 33 12 - 49 % MONOCYTES 9 5 - 13 % EOSINOPHILS 3 0 - 7 % BASOPHILS 0 0 - 1 % METAMYELOCYTES 0 0 % MYELOCYTES 0 0 % PROMYELOCYTES 0 0 % BLASTS 0 0 % OTHER CELL 0 0 IMMATURE GRANULOCYTES 0 %  
 ABS. NEUTROPHILS 4.7 1.8 - 8.0 K/UL  
 ABS. LYMPHOCYTES 2.8 0.8 - 3.5 K/UL  
 ABS. MONOCYTES 0.8 0.0 - 1.0 K/UL  
 ABS. EOSINOPHILS 0.3 0.0 - 0.4 K/UL  
 ABS. BASOPHILS 0.0 0.0 - 0.1 K/UL  
 ABS. IMM. GRANS. 0.0 K/UL  
 DF MANUAL PLATELET COMMENTS Large Platelets RBC COMMENTS TARGET CELLS 2+ Assessment:  
 
Principal Problem: 
  Sickle cell pain crisis ([facility] [address] (9/8/2018) Active Problems: Hypertension (10/3/2014) Depression (10/5/2014) Nausea and vomiting (7/15/2018) Plan: 1. Continue treatment of painful crises. 2.  Patient needs to be mobilized. 3.  Continue to monitor temperature elevation and await urine culture. Antibiotics continue for now.

## 2018-09-12 NOTE — PROGRESS NOTES
Bedside shift change report given to Kelsie Manrique (oncoming nurse) by Erick Davis RN (offgoing nurse). Report included the following information SBAR, Kardex, Procedure Summary, Intake/Output, MAR, Accordion, Recent Results and Med Rec Status.

## 2018-09-13 PROCEDURE — 74011000258 HC RX REV CODE- 258: Performed by: INTERNAL MEDICINE

## 2018-09-13 PROCEDURE — 74011250637 HC RX REV CODE- 250/637: Performed by: INTERNAL MEDICINE

## 2018-09-13 PROCEDURE — 65270000029 HC RM PRIVATE

## 2018-09-13 PROCEDURE — 74011000250 HC RX REV CODE- 250: Performed by: INTERNAL MEDICINE

## 2018-09-13 PROCEDURE — 94760 N-INVAS EAR/PLS OXIMETRY 1: CPT

## 2018-09-13 PROCEDURE — 74011250636 HC RX REV CODE- 250/636: Performed by: INTERNAL MEDICINE

## 2018-09-13 PROCEDURE — 77010033678 HC OXYGEN DAILY

## 2018-09-13 RX ORDER — SUCRALFATE 1 G/10ML
1 SUSPENSION ORAL
Status: DISCONTINUED | OUTPATIENT
Start: 2018-09-13 | End: 2018-09-13 | Stop reason: SDUPTHER

## 2018-09-13 RX ORDER — SUCRALFATE 1 G/1
1 TABLET ORAL
Status: DISCONTINUED | OUTPATIENT
Start: 2018-09-13 | End: 2018-09-20 | Stop reason: HOSPADM

## 2018-09-13 RX ADMIN — HYDROMORPHONE HYDROCHLORIDE 1 MG: 2 INJECTION, SOLUTION INTRAMUSCULAR; INTRAVENOUS; SUBCUTANEOUS at 21:03

## 2018-09-13 RX ADMIN — HYDROMORPHONE HYDROCHLORIDE 1 MG: 2 INJECTION, SOLUTION INTRAMUSCULAR; INTRAVENOUS; SUBCUTANEOUS at 05:00

## 2018-09-13 RX ADMIN — POLYETHYLENE GLYCOL 3350 17 G: 17 POWDER, FOR SOLUTION ORAL at 08:50

## 2018-09-13 RX ADMIN — ONDANSETRON 4 MG: 2 INJECTION INTRAMUSCULAR; INTRAVENOUS at 01:33

## 2018-09-13 RX ADMIN — SUCRALFATE 1 G: 1 TABLET ORAL at 21:03

## 2018-09-13 RX ADMIN — HYDROMORPHONE HYDROCHLORIDE 1 MG: 2 INJECTION, SOLUTION INTRAMUSCULAR; INTRAVENOUS; SUBCUTANEOUS at 18:17

## 2018-09-13 RX ADMIN — PANTOPRAZOLE SODIUM 40 MG: 40 TABLET, DELAYED RELEASE ORAL at 06:33

## 2018-09-13 RX ADMIN — Medication 10 ML: at 00:44

## 2018-09-13 RX ADMIN — DIPHENHYDRAMINE HYDROCHLORIDE 12.5 MG: 50 INJECTION, SOLUTION INTRAMUSCULAR; INTRAVENOUS at 14:18

## 2018-09-13 RX ADMIN — HYDROMORPHONE HYDROCHLORIDE 1 MG: 2 INJECTION, SOLUTION INTRAMUSCULAR; INTRAVENOUS; SUBCUTANEOUS at 01:37

## 2018-09-13 RX ADMIN — PROCHLORPERAZINE EDISYLATE 10 MG: 5 INJECTION INTRAMUSCULAR; INTRAVENOUS at 12:03

## 2018-09-13 RX ADMIN — PROCHLORPERAZINE EDISYLATE 10 MG: 5 INJECTION INTRAMUSCULAR; INTRAVENOUS at 18:18

## 2018-09-13 RX ADMIN — DIPHENHYDRAMINE HYDROCHLORIDE 12.5 MG: 50 INJECTION, SOLUTION INTRAMUSCULAR; INTRAVENOUS at 01:35

## 2018-09-13 RX ADMIN — SUCRALFATE 1 G: 1 TABLET ORAL at 16:30

## 2018-09-13 RX ADMIN — ENOXAPARIN SODIUM 40 MG: 40 INJECTION, SOLUTION INTRAVENOUS; SUBCUTANEOUS at 14:18

## 2018-09-13 RX ADMIN — HYDROMORPHONE HYDROCHLORIDE 1 MG: 2 INJECTION, SOLUTION INTRAMUSCULAR; INTRAVENOUS; SUBCUTANEOUS at 15:10

## 2018-09-13 RX ADMIN — CITALOPRAM HYDROBROMIDE 10 MG: 20 TABLET ORAL at 00:00

## 2018-09-13 RX ADMIN — SUCRALFATE 1 G: 1 SUSPENSION ORAL at 11:22

## 2018-09-13 RX ADMIN — HYDROMORPHONE HYDROCHLORIDE 1 MG: 2 INJECTION, SOLUTION INTRAMUSCULAR; INTRAVENOUS; SUBCUTANEOUS at 12:03

## 2018-09-13 RX ADMIN — Medication 10 ML: at 21:12

## 2018-09-13 RX ADMIN — HYDROMORPHONE HYDROCHLORIDE 1 MG: 2 INJECTION, SOLUTION INTRAMUSCULAR; INTRAVENOUS; SUBCUTANEOUS at 08:50

## 2018-09-13 RX ADMIN — Medication 10 ML: at 05:41

## 2018-09-13 RX ADMIN — POTASSIUM CHLORIDE: 149 INJECTION, SOLUTION, CONCENTRATE INTRAVENOUS at 15:13

## 2018-09-13 RX ADMIN — FOLIC ACID 1 MG: 1 TABLET ORAL at 08:50

## 2018-09-13 RX ADMIN — DIPHENHYDRAMINE HYDROCHLORIDE 12.5 MG: 50 INJECTION, SOLUTION INTRAMUSCULAR; INTRAVENOUS at 08:50

## 2018-09-13 RX ADMIN — PROCHLORPERAZINE EDISYLATE 10 MG: 5 INJECTION INTRAMUSCULAR; INTRAVENOUS at 04:59

## 2018-09-13 RX ADMIN — DIPHENHYDRAMINE HYDROCHLORIDE 12.5 MG: 50 INJECTION, SOLUTION INTRAMUSCULAR; INTRAVENOUS at 19:29

## 2018-09-13 RX ADMIN — CEFTRIAXONE 1 G: 1 INJECTION, POWDER, FOR SOLUTION INTRAMUSCULAR; INTRAVENOUS at 21:10

## 2018-09-13 RX ADMIN — Medication 10 ML: at 15:11

## 2018-09-13 RX ADMIN — POTASSIUM CHLORIDE: 149 INJECTION, SOLUTION, CONCENTRATE INTRAVENOUS at 05:07

## 2018-09-13 RX ADMIN — ONDANSETRON 4 MG: 2 INJECTION INTRAMUSCULAR; INTRAVENOUS at 19:28

## 2018-09-13 RX ADMIN — Medication 10 ML: at 18:18

## 2018-09-13 RX ADMIN — ONDANSETRON 4 MG: 2 INJECTION INTRAMUSCULAR; INTRAVENOUS at 14:18

## 2018-09-13 RX ADMIN — ONDANSETRON 4 MG: 2 INJECTION INTRAMUSCULAR; INTRAVENOUS at 08:50

## 2018-09-13 NOTE — PROGRESS NOTES
Bedside shift change report given to Allie (oncoming nurse) by Lula Gruber RN (offgoing nurse). Report included the following information SBAR, Kardex, Procedure Summary, Intake/Output, MAR, Accordion, Recent Results and Med Rec Status. Pain and nausea control

## 2018-09-13 NOTE — PROGRESS NOTES
General Daily Progress Note Admit Date: 9/8/2018 Subjective:  
 
Patient complains of nausea and vomiting with eating Current Facility-Administered Medications Medication Dose Route Frequency  sucralfate (CARAFATE) 100 mg/mL oral suspension 1 g  1 g Oral AC&HS  pantoprazole (PROTONIX) tablet 40 mg  40 mg Oral ACB  citalopram (CELEXA) tablet 10 mg  10 mg Oral DAILY  folic acid (FOLVITE) tablet 1 mg  1 mg Oral DAILY  polyethylene glycol (MIRALAX) packet 17 g  17 g Oral DAILY  dextrose 5 % - 0.45% NaCl 1,000 mL with potassium chloride 30 mEq infusion   IntraVENous CONTINUOUS  
 enoxaparin (LOVENOX) injection 40 mg  40 mg SubCUTAneous Q24H  
 saline peripheral flush soln 10 mL  10 mL InterCATHeter PRN  
 SALINE PERIPHERAL FLUSH Q8H soln 10 mL  10 mL InterCATHeter Q8H  
 acetaminophen (TYLENOL) tablet 650 mg  650 mg Oral Q4H PRN  
 cefTRIAXone (ROCEPHIN) 1 g in 0.9% sodium chloride (MBP/ADV) 50 mL  1 g IntraVENous Q24H  
 sodium chloride (NS) flush 5-10 mL  5-10 mL IntraVENous Q8H  
 sodium chloride (NS) flush 5-10 mL  5-10 mL IntraVENous PRN  
 naloxone (NARCAN) injection 0.4 mg  0.4 mg IntraVENous PRN  
 diphenhydrAMINE (BENADRYL) injection 12.5 mg  12.5 mg IntraVENous Q4H PRN  
 ondansetron (ZOFRAN) injection 4 mg  4 mg IntraVENous Q4H PRN  
 bisacodyl (DULCOLAX) suppository 10 mg  10 mg Rectal DAILY PRN  
 HYDROmorphone (PF) (DILAUDID) injection 1 mg  1 mg IntraVENous Q3H PRN  prochlorperazine (COMPAZINE) with saline injection 10 mg  10 mg IntraVENous Q6H PRN Review of Systems A comprehensive review of systems was negative. Objective:  
 
Patient Vitals for the past 24 hrs: 
 BP Temp Pulse Resp SpO2  
09/13/18 0446 122/62 98.9 °F (37.2 °C) 81 16 100 % 09/13/18 0018 132/67 99.8 °F (37.7 °C) 93 16 99 % 09/12/18 2034 163/83 99.9 °F (37.7 °C) 89 16 100 % 09/12/18 1439 (!) 190/104 99.1 °F (37.3 °C) 87 17 100 % 09/12/18 1151 146/85 99.3 °F (37.4 °C) 93 16 99 % 09/11 1901 - 09/13 0700 In: 3503 [I.V.:3858] Out: 1300 [Urine:600] Physical Exam:  
Visit Vitals  /62 (BP Patient Position: At rest)  Pulse 81  Temp 98.9 °F (37.2 °C)  Resp 16  
 Ht 5' 11\" (1.803 m)  Wt 185 lb (83.9 kg)  SpO2 100%  BMI 25.8 kg/m2 General appearance: alert, cooperative, no distress, appears stated age Neck: supple, symmetrical, trachea midline, no adenopathy, thyroid: not enlarged, symmetric, no tenderness/mass/nodules, no carotid bruit and no JVD Lungs: clear to auscultation bilaterally Heart: regular rate and rhythm, S1, S2 normal, no murmur, click, rub or gallop Abdomen: soft, non-tender. Bowel sounds normal. No masses,  no organomegaly Extremities: extremities normal, atraumatic, no cyanosis or edema Data Review No results found for this or any previous visit (from the past 24 hour(s)). Assessment:  
 
Principal Problem: 
  Sickle cell pain crisis ([facility] [address] (9/8/2018) Active Problems: Hypertension (10/3/2014) Depression (10/5/2014) Nausea and vomiting (7/15/2018) Plan: 1. Symptomatic treatment for nausea and vomiting. If persists patient will have to see gastroenterology. 2.  Continue treatment of her painful crises which is gradually improving. 3.  Will mobilize.

## 2018-09-13 NOTE — PROGRESS NOTES
7am-7pm 
 
Patient in bed this morning She has received pain medication Dilaudid every three hours, alternating  Zofran and Compazine She has been resting quietly since receiving medication.

## 2018-09-13 NOTE — PROGRESS NOTES
Spiritual Care Assessment/Progress Note [facility] 
 
 
NAME: Alethea Torre      MRN: [id number] AGE: 56 y.o. SEX: female Confucianist Affiliation: Non [employer]ian  
Language: [place] 9/13/2018     Total Time (in minutes): 10 Spiritual Assessment begun in [facility] GENERAL SURGERY through conversation with: 
  
    [x]Patient        [] Family    [] Friend(s) Reason for Consult: Initial/Spiritual assessment, patient floor Spiritual beliefs: (Please include comment if needed) [x] Identifies with a zay tradition:     
   [x] Supported by a zay community:        
   [] Claims no spiritual orientation:       
   [] Seeking spiritual identity:            
   [] Adheres to an individual form of spirituality:       
   [] Not able to assess:                   
 
    
Identified resources for coping:  
   [] Prayer                           
   [] Music                  [] Guided Imagery [x] Family/friends                 [] Pet visits [] Devotional reading                         [] Unknown 
   [] Other:                               
 
 
Interventions offered during this visit: (See comments for more details) Patient Interventions: Affirmation of emotions/emotional suffering, Coping skills reviewed/reinforced, Iconic (affirming the presence of God/Higher Power), Affirmation of zay Plan of Care: 
 
 [x] Support spiritual and/or cultural needs  
 [] Support AMD and/or advance care planning process    
 [] Support grieving process 
 [] Coordinate Rites and/or Rituals  
 [] Coordination with community clergy [] No spiritual needs identified at this time 
 [] Detailed Plan of Care below (See Comments)  [] Make referral to Music Therapy 
[] Make referral to Pet Therapy    
[] Make referral to Addiction services 
[] Make referral to [facility] 
[] Make referral to Spiritual Care Partner 
[] No future visits requested [] Follow up visits as needed Comments Patient lying in bed resting with television on low. Good eye contact, smiling, bright, friendly. Says she is feeling better. Spoke about events leading to current hospitalization and briefly spoke about what it has been like coping with Sickle Cell Disease. Says she has been through this before and it has become fairly routine. Expresses hope and remains positive in good spirits. Desire is to be able to return home to be with her family. Appeared encouraged as a result of this visit and expressed gratitude for this visit. Visited by Rev. Josue Acuna, [address] [facility]  paging service: [phone])

## 2018-09-14 LAB
ANION GAP SERPL CALC-SCNC: 4 MMOL/L (ref 5–15)
BUN SERPL-MCNC: 1 MG/DL (ref 6–20)
BUN/CREAT SERPL: 2 (ref 12–20)
CALCIUM SERPL-MCNC: 8.4 MG/DL (ref 8.5–10.1)
CHLORIDE SERPL-SCNC: 105 MMOL/L (ref 97–108)
CO2 SERPL-SCNC: 29 MMOL/L (ref 21–32)
CREAT SERPL-MCNC: 0.59 MG/DL (ref 0.55–1.02)
GLUCOSE SERPL-MCNC: 84 MG/DL (ref 65–100)
LIPASE SERPL-CCNC: 94 U/L (ref 73–393)
POTASSIUM SERPL-SCNC: 4.2 MMOL/L (ref 3.5–5.1)
SODIUM SERPL-SCNC: 138 MMOL/L (ref 136–145)

## 2018-09-14 PROCEDURE — 80048 BASIC METABOLIC PNL TOTAL CA: CPT | Performed by: INTERNAL MEDICINE

## 2018-09-14 PROCEDURE — 74011250636 HC RX REV CODE- 250/636: Performed by: INTERNAL MEDICINE

## 2018-09-14 PROCEDURE — 74011000250 HC RX REV CODE- 250: Performed by: INTERNAL MEDICINE

## 2018-09-14 PROCEDURE — 74011000258 HC RX REV CODE- 258: Performed by: INTERNAL MEDICINE

## 2018-09-14 PROCEDURE — 83690 ASSAY OF LIPASE: CPT | Performed by: INTERNAL MEDICINE

## 2018-09-14 PROCEDURE — 74011250637 HC RX REV CODE- 250/637: Performed by: INTERNAL MEDICINE

## 2018-09-14 PROCEDURE — 65270000029 HC RM PRIVATE

## 2018-09-14 PROCEDURE — 77010033678 HC OXYGEN DAILY

## 2018-09-14 PROCEDURE — 36415 COLL VENOUS BLD VENIPUNCTURE: CPT | Performed by: INTERNAL MEDICINE

## 2018-09-14 PROCEDURE — 94760 N-INVAS EAR/PLS OXIMETRY 1: CPT

## 2018-09-14 RX ORDER — DEXTROSE, SODIUM CHLORIDE, AND POTASSIUM CHLORIDE 5; .45; .15 G/100ML; G/100ML; G/100ML
50 INJECTION INTRAVENOUS CONTINUOUS
Status: DISCONTINUED | OUTPATIENT
Start: 2018-09-14 | End: 2018-09-20 | Stop reason: HOSPADM

## 2018-09-14 RX ADMIN — HYDROMORPHONE HYDROCHLORIDE 1 MG: 2 INJECTION, SOLUTION INTRAMUSCULAR; INTRAVENOUS; SUBCUTANEOUS at 11:01

## 2018-09-14 RX ADMIN — DEXTROSE MONOHYDRATE, SODIUM CHLORIDE, AND POTASSIUM CHLORIDE 100 ML/HR: 50; 4.5; 1.49 INJECTION, SOLUTION INTRAVENOUS at 10:28

## 2018-09-14 RX ADMIN — Medication 10 ML: at 05:34

## 2018-09-14 RX ADMIN — PANTOPRAZOLE SODIUM 40 MG: 40 TABLET, DELAYED RELEASE ORAL at 08:10

## 2018-09-14 RX ADMIN — ONDANSETRON 4 MG: 2 INJECTION INTRAMUSCULAR; INTRAVENOUS at 00:03

## 2018-09-14 RX ADMIN — ONDANSETRON 4 MG: 2 INJECTION INTRAMUSCULAR; INTRAVENOUS at 03:46

## 2018-09-14 RX ADMIN — DIPHENHYDRAMINE HYDROCHLORIDE 12.5 MG: 50 INJECTION, SOLUTION INTRAMUSCULAR; INTRAVENOUS at 00:07

## 2018-09-14 RX ADMIN — ONDANSETRON 4 MG: 2 INJECTION INTRAMUSCULAR; INTRAVENOUS at 08:09

## 2018-09-14 RX ADMIN — ENOXAPARIN SODIUM 40 MG: 40 INJECTION, SOLUTION INTRAVENOUS; SUBCUTANEOUS at 14:05

## 2018-09-14 RX ADMIN — HYDROMORPHONE HYDROCHLORIDE 1 MG: 2 INJECTION, SOLUTION INTRAMUSCULAR; INTRAVENOUS; SUBCUTANEOUS at 20:08

## 2018-09-14 RX ADMIN — DIPHENHYDRAMINE HYDROCHLORIDE 12.5 MG: 50 INJECTION, SOLUTION INTRAMUSCULAR; INTRAVENOUS at 03:50

## 2018-09-14 RX ADMIN — PROCHLORPERAZINE EDISYLATE 10 MG: 5 INJECTION INTRAMUSCULAR; INTRAVENOUS at 11:01

## 2018-09-14 RX ADMIN — HYDROMORPHONE HYDROCHLORIDE 1 MG: 2 INJECTION, SOLUTION INTRAMUSCULAR; INTRAVENOUS; SUBCUTANEOUS at 00:05

## 2018-09-14 RX ADMIN — DIPHENHYDRAMINE HYDROCHLORIDE 12.5 MG: 50 INJECTION, SOLUTION INTRAMUSCULAR; INTRAVENOUS at 12:40

## 2018-09-14 RX ADMIN — HYDROMORPHONE HYDROCHLORIDE 1 MG: 2 INJECTION, SOLUTION INTRAMUSCULAR; INTRAVENOUS; SUBCUTANEOUS at 03:48

## 2018-09-14 RX ADMIN — SUCRALFATE 1 G: 1 TABLET ORAL at 21:00

## 2018-09-14 RX ADMIN — DEXTROSE MONOHYDRATE, SODIUM CHLORIDE, AND POTASSIUM CHLORIDE 100 ML/HR: 50; 4.5; 1.49 INJECTION, SOLUTION INTRAVENOUS at 20:21

## 2018-09-14 RX ADMIN — POLYETHYLENE GLYCOL 3350 17 G: 17 POWDER, FOR SOLUTION ORAL at 09:00

## 2018-09-14 RX ADMIN — CITALOPRAM HYDROBROMIDE 10 MG: 20 TABLET ORAL at 08:10

## 2018-09-14 RX ADMIN — FOLIC ACID 1 MG: 1 TABLET ORAL at 08:10

## 2018-09-14 RX ADMIN — HYDROMORPHONE HYDROCHLORIDE 1 MG: 2 INJECTION, SOLUTION INTRAMUSCULAR; INTRAVENOUS; SUBCUTANEOUS at 17:24

## 2018-09-14 RX ADMIN — DIPHENHYDRAMINE HYDROCHLORIDE 12.5 MG: 50 INJECTION, SOLUTION INTRAMUSCULAR; INTRAVENOUS at 16:05

## 2018-09-14 RX ADMIN — Medication 10 ML: at 16:06

## 2018-09-14 RX ADMIN — DIPHENHYDRAMINE HYDROCHLORIDE 12.5 MG: 50 INJECTION, SOLUTION INTRAMUSCULAR; INTRAVENOUS at 08:09

## 2018-09-14 RX ADMIN — SUCRALFATE 1 G: 1 TABLET ORAL at 12:40

## 2018-09-14 RX ADMIN — POTASSIUM CHLORIDE: 149 INJECTION, SOLUTION, CONCENTRATE INTRAVENOUS at 03:46

## 2018-09-14 RX ADMIN — DIPHENHYDRAMINE HYDROCHLORIDE 12.5 MG: 50 INJECTION, SOLUTION INTRAMUSCULAR; INTRAVENOUS at 20:09

## 2018-09-14 RX ADMIN — Medication 10 ML: at 20:23

## 2018-09-14 RX ADMIN — ONDANSETRON 4 MG: 2 INJECTION INTRAMUSCULAR; INTRAVENOUS at 20:11

## 2018-09-14 RX ADMIN — ONDANSETRON 4 MG: 2 INJECTION INTRAMUSCULAR; INTRAVENOUS at 12:40

## 2018-09-14 RX ADMIN — SUCRALFATE 1 G: 1 TABLET ORAL at 08:10

## 2018-09-14 RX ADMIN — ONDANSETRON 4 MG: 2 INJECTION INTRAMUSCULAR; INTRAVENOUS at 16:05

## 2018-09-14 RX ADMIN — PROCHLORPERAZINE EDISYLATE 10 MG: 5 INJECTION INTRAMUSCULAR; INTRAVENOUS at 19:15

## 2018-09-14 RX ADMIN — SUCRALFATE 1 G: 1 TABLET ORAL at 16:12

## 2018-09-14 RX ADMIN — HYDROMORPHONE HYDROCHLORIDE 1 MG: 2 INJECTION, SOLUTION INTRAMUSCULAR; INTRAVENOUS; SUBCUTANEOUS at 08:09

## 2018-09-14 RX ADMIN — HYDROMORPHONE HYDROCHLORIDE 1 MG: 2 INJECTION, SOLUTION INTRAMUSCULAR; INTRAVENOUS; SUBCUTANEOUS at 14:05

## 2018-09-14 NOTE — PROGRESS NOTES
General Daily Progress Note Admit Date: 9/8/2018 Subjective:  
 
Patient complains of pain but improving. Current Facility-Administered Medications Medication Dose Route Frequency  dextrose 5 % - 0.45% NaCl 1,000 mL with potassium chloride 20 mEq infusion   IntraVENous CONTINUOUS  
 sucralfate (CARAFATE) tablet 1 g  1 g Oral AC&HS  pantoprazole (PROTONIX) tablet 40 mg  40 mg Oral ACB  citalopram (CELEXA) tablet 10 mg  10 mg Oral DAILY  folic acid (FOLVITE) tablet 1 mg  1 mg Oral DAILY  polyethylene glycol (MIRALAX) packet 17 g  17 g Oral DAILY  enoxaparin (LOVENOX) injection 40 mg  40 mg SubCUTAneous Q24H  
 saline peripheral flush soln 10 mL  10 mL InterCATHeter PRN  
 SALINE PERIPHERAL FLUSH Q8H soln 10 mL  10 mL InterCATHeter Q8H  
 acetaminophen (TYLENOL) tablet 650 mg  650 mg Oral Q4H PRN  
 sodium chloride (NS) flush 5-10 mL  5-10 mL IntraVENous Q8H  
 sodium chloride (NS) flush 5-10 mL  5-10 mL IntraVENous PRN  
 naloxone (NARCAN) injection 0.4 mg  0.4 mg IntraVENous PRN  
 diphenhydrAMINE (BENADRYL) injection 12.5 mg  12.5 mg IntraVENous Q4H PRN  
 ondansetron (ZOFRAN) injection 4 mg  4 mg IntraVENous Q4H PRN  
 bisacodyl (DULCOLAX) suppository 10 mg  10 mg Rectal DAILY PRN  
 HYDROmorphone (PF) (DILAUDID) injection 1 mg  1 mg IntraVENous Q3H PRN  prochlorperazine (COMPAZINE) with saline injection 10 mg  10 mg IntraVENous Q6H PRN Review of Systems A comprehensive review of systems was negative. Objective:  
 
Patient Vitals for the past 24 hrs: 
 BP Temp Pulse Resp SpO2  
09/14/18 0719 136/83 98.5 °F (36.9 °C) 77 18 99 % 09/14/18 0432 121/64 98.3 °F (36.8 °C) 74 18 99 % 09/13/18 2311 117/75 98.8 °F (37.1 °C) 89 18 100 % 09/13/18 2011 (!) 115/93 98.7 °F (37.1 °C) 85 20 100 % 09/13/18 1612 136/70 98.5 °F (36.9 °C) 78 16 99 % 09/13/18 1042 135/78 98.6 °F (37 °C) 87 16 100 % 09/12 1901 - 09/14 0700 In: 2500 [P.O.:500; I.V.:2000] Out: - Physical Exam:  
Visit Vitals  /83  Pulse 77  Temp 98.5 °F (36.9 °C)  Resp 18  Ht 5' 11\" (1.803 m)  Wt 185 lb (83.9 kg)  SpO2 99%  BMI 25.8 kg/m2 General appearance: alert, cooperative, no distress, appears stated age Neck: supple, symmetrical, trachea midline, no adenopathy, thyroid: not enlarged, symmetric, no tenderness/mass/nodules, no carotid bruit and no JVD Lungs: clear to auscultation bilaterally Heart: regular rate and rhythm, S1, S2 normal, no murmur, click, rub or gallop Abdomen: soft, non-tender. Bowel sounds normal. No masses,  no organomegaly Extremities: extremities normal, atraumatic, no cyanosis or edema Data Review Recent Results (from the past 24 hour(s)) LIPASE Collection Time: 09/14/18  2:31 AM  
Result Value Ref Range Lipase 94 73 - 295 U/L  
METABOLIC PANEL, BASIC Collection Time: 09/14/18  2:31 AM  
Result Value Ref Range Sodium 138 136 - 145 mmol/L Potassium 4.2 3.5 - 5.1 mmol/L Chloride 105 97 - 108 mmol/L  
 CO2 29 21 - 32 mmol/L Anion gap 4 (L) 5 - 15 mmol/L Glucose 84 65 - 100 mg/dL BUN 1 (L) 6 - 20 MG/DL Creatinine 0.59 0.55 - 1.02 MG/DL  
 BUN/Creatinine ratio 2 (L) 12 - 20 GFR est AA >60 >60 ml/min/1.73m2 GFR est non-AA >60 >60 ml/min/1.73m2 Calcium 8.4 (L) 8.5 - 10.1 MG/DL Assessment:  
 
Principal Problem: 
  Sickle cell pain crisis ([facility] [address] (9/8/2018) Active Problems: Hypertension (10/3/2014) Depression (10/5/2014) Nausea and vomiting (7/15/2018) Plan: 1. Painful crises persist but improving. No change in treatment regimen for now. 2.  GI symptoms have improved no further nausea and vomiting. 3.  Will mobilize.

## 2018-09-14 NOTE — PROGRESS NOTES
Initial Nutrition Assessment: 
 
INTERVENTIONS/RECOMMENDATIONS:  
· Meals/Snacks: General/healthful diet:  Continue regular diet. ASSESSMENT:  
9/14:  Chart reviewed; med noted for sickle cell crisis. N/V resolving. Tolerating a regular diet. LOS screen. No acute nutrition dx at this time. Diet Order: Regular 
% Eaten:  Patient Vitals for the past 72 hrs: 
 % Diet Eaten 09/14/18 1051 100 % Pertinent Medications: [x]Reviewed []Other Pertinent Labs: [x]Reviewed []Other Food Allergies: [x]None []Other Last BM:    [x]Active     []Hyperactive  []Hypoactive       [] Absent BS Skin:    [x] Intact   [] Incision  [] Breakdown  [] Other: Anthropometrics:  
Height: 5' 11\" (180.3 cm) Weight: 83.9 kg (185 lb) IBW (%IBW):   ( ) UBW (%UBW):   (  %) Last Weight Metrics: 
Weight Loss Metrics 9/8/2018 9/7/2018 7/22/2018 7/9/2018 6/12/2018 4/9/2018 3/13/2018 Today's Wt - 185 lb 188 lb 15 oz 191 lb 195 lb 8 oz 189 lb 8 oz 187 lb 3.2 oz  
BMI 25.8 kg/m2 - 26.35 kg/m2 27.41 kg/m2 28.05 kg/m2 27.19 kg/m2 26.86 kg/m2 BMI: Body mass index is 25.8 kg/(m^2). This BMI is indicative of: 
 []Underweight    []Normal    [x]Overweight    [] Obesity   [] Extreme Obesity (BMI>40) Estimated Nutrition Needs (Based on):  
9115 Kcals/day (BMR (1694) x 1. 3AF) , 84 g (1.0 g/kg bw) Protein Carbohydrate: At Least 130 g/day  Fluids: 2200 mL/day (1ml/kcal) NUTRITION DIAGNOSES:  
Problem:  No nutritional diagnosis at this time Etiology: related to Signs/Symptoms: as evidenced by NUTRITION INTERVENTIONS: 
Meals/Snacks: General/healthful diet GOAL:  
PO intake >75% of meals next 5-7 days LEARNING NEEDS (Diet, Food/Nutrient-Drug Interaction):  
 [x] None Identified 
 [] Identified and Education Provided/Documented 
 [] Identified and Pt declined/was not appropriate Cultureal, Evangelical, OR Ethnic Dietary Needs:  
 [x] None Identified 
 [] Identified and Addressed [x] Interdisciplinary Care Plan Reviewed/Documented  
 [x] Discharge Planning:  Continue regular diet MONITORING /EVALUATION:  
  
Food/Nutrient Intake Outcomes: Total energy intake Physical Signs/Symptoms Outcomes: Weight/weight change NUTRITION RISK:  
 [] Patient At Nutritional Risk  
 [x] Patient Not At Nutritional Risk PT SEEN FOR:  
 []  MD Consult: []Calorie Count []Diabetic Diet Education []Diet Education []Electrolyte Management []General Nutrition Management and Supplements []Management of Tube Feeding []TPN Recommendations []  RN Referral:  []MST score >=2 
   []Enteral/Parenteral Nutrition PTA []Pregnant: Gestational DM or Multigestation 
   []Pressure Ulcer/Wound Care needs 
     
[]  Low BMI [x]  KIRILL Mayer RD Pager [phone] Weekend Pager [phone]

## 2018-09-14 NOTE — ROUTINE PROCESS
Bedside shift change report given to Clementina Lozano RN (oncoming nurse) by Francisco Painter RN(offgoing nurse). Report included the following information SBAR, Intake/Output, MAR, Accordion and Recent Results.

## 2018-09-14 NOTE — PROGRESS NOTES
Bedside and Verbal shift change report given to Junior Rae RN (oncoming nurse) by Bairon Livingston (offgoing nurse). Report included the following information SBAR, Kardex, Intake/Output, MAR and Recent Results.

## 2018-09-15 PROCEDURE — 74011250636 HC RX REV CODE- 250/636: Performed by: INTERNAL MEDICINE

## 2018-09-15 PROCEDURE — 65270000029 HC RM PRIVATE

## 2018-09-15 PROCEDURE — 74011250637 HC RX REV CODE- 250/637: Performed by: INTERNAL MEDICINE

## 2018-09-15 PROCEDURE — 74011000250 HC RX REV CODE- 250: Performed by: INTERNAL MEDICINE

## 2018-09-15 PROCEDURE — 94760 N-INVAS EAR/PLS OXIMETRY 1: CPT

## 2018-09-15 PROCEDURE — 77010033678 HC OXYGEN DAILY

## 2018-09-15 RX ADMIN — HYDROMORPHONE HYDROCHLORIDE 1 MG: 2 INJECTION, SOLUTION INTRAMUSCULAR; INTRAVENOUS; SUBCUTANEOUS at 09:38

## 2018-09-15 RX ADMIN — ENOXAPARIN SODIUM 40 MG: 40 INJECTION, SOLUTION INTRAVENOUS; SUBCUTANEOUS at 14:20

## 2018-09-15 RX ADMIN — DIPHENHYDRAMINE HYDROCHLORIDE 12.5 MG: 50 INJECTION, SOLUTION INTRAMUSCULAR; INTRAVENOUS at 09:31

## 2018-09-15 RX ADMIN — DIPHENHYDRAMINE HYDROCHLORIDE 12.5 MG: 50 INJECTION, SOLUTION INTRAMUSCULAR; INTRAVENOUS at 00:02

## 2018-09-15 RX ADMIN — HYDROMORPHONE HYDROCHLORIDE 1 MG: 2 INJECTION, SOLUTION INTRAMUSCULAR; INTRAVENOUS; SUBCUTANEOUS at 12:54

## 2018-09-15 RX ADMIN — Medication 10 ML: at 04:47

## 2018-09-15 RX ADMIN — HYDROMORPHONE HYDROCHLORIDE 1 MG: 2 INJECTION, SOLUTION INTRAMUSCULAR; INTRAVENOUS; SUBCUTANEOUS at 16:00

## 2018-09-15 RX ADMIN — CITALOPRAM HYDROBROMIDE 10 MG: 20 TABLET ORAL at 09:32

## 2018-09-15 RX ADMIN — ONDANSETRON 4 MG: 2 INJECTION INTRAMUSCULAR; INTRAVENOUS at 04:46

## 2018-09-15 RX ADMIN — HYDROMORPHONE HYDROCHLORIDE 1 MG: 2 INJECTION, SOLUTION INTRAMUSCULAR; INTRAVENOUS; SUBCUTANEOUS at 22:03

## 2018-09-15 RX ADMIN — HYDROMORPHONE HYDROCHLORIDE 1 MG: 2 INJECTION, SOLUTION INTRAMUSCULAR; INTRAVENOUS; SUBCUTANEOUS at 07:03

## 2018-09-15 RX ADMIN — DIPHENHYDRAMINE HYDROCHLORIDE 12.5 MG: 50 INJECTION, SOLUTION INTRAMUSCULAR; INTRAVENOUS at 04:46

## 2018-09-15 RX ADMIN — DEXTROSE MONOHYDRATE, SODIUM CHLORIDE, AND POTASSIUM CHLORIDE 100 ML/HR: 50; 4.5; 1.49 INJECTION, SOLUTION INTRAVENOUS at 14:20

## 2018-09-15 RX ADMIN — HYDROMORPHONE HYDROCHLORIDE 1 MG: 2 INJECTION, SOLUTION INTRAMUSCULAR; INTRAVENOUS; SUBCUTANEOUS at 00:02

## 2018-09-15 RX ADMIN — PANTOPRAZOLE SODIUM 40 MG: 40 TABLET, DELAYED RELEASE ORAL at 09:32

## 2018-09-15 RX ADMIN — POLYETHYLENE GLYCOL 3350 17 G: 17 POWDER, FOR SOLUTION ORAL at 09:31

## 2018-09-15 RX ADMIN — Medication 10 ML: at 22:02

## 2018-09-15 RX ADMIN — SUCRALFATE 1 G: 1 TABLET ORAL at 09:31

## 2018-09-15 RX ADMIN — SUCRALFATE 1 G: 1 TABLET ORAL at 22:01

## 2018-09-15 RX ADMIN — ONDANSETRON 4 MG: 2 INJECTION INTRAMUSCULAR; INTRAVENOUS at 14:20

## 2018-09-15 RX ADMIN — PROCHLORPERAZINE EDISYLATE 10 MG: 5 INJECTION INTRAMUSCULAR; INTRAVENOUS at 07:05

## 2018-09-15 RX ADMIN — SUCRALFATE 1 G: 1 TABLET ORAL at 16:00

## 2018-09-15 RX ADMIN — PROCHLORPERAZINE EDISYLATE 10 MG: 5 INJECTION INTRAMUSCULAR; INTRAVENOUS at 19:05

## 2018-09-15 RX ADMIN — ONDANSETRON 4 MG: 2 INJECTION INTRAMUSCULAR; INTRAVENOUS at 09:31

## 2018-09-15 RX ADMIN — DEXTROSE MONOHYDRATE, SODIUM CHLORIDE, AND POTASSIUM CHLORIDE 100 ML/HR: 50; 4.5; 1.49 INJECTION, SOLUTION INTRAVENOUS at 05:37

## 2018-09-15 RX ADMIN — DIPHENHYDRAMINE HYDROCHLORIDE 12.5 MG: 50 INJECTION, SOLUTION INTRAMUSCULAR; INTRAVENOUS at 22:03

## 2018-09-15 RX ADMIN — DIPHENHYDRAMINE HYDROCHLORIDE 12.5 MG: 50 INJECTION, SOLUTION INTRAMUSCULAR; INTRAVENOUS at 14:20

## 2018-09-15 RX ADMIN — SUCRALFATE 1 G: 1 TABLET ORAL at 12:54

## 2018-09-15 RX ADMIN — ONDANSETRON 4 MG: 2 INJECTION INTRAMUSCULAR; INTRAVENOUS at 00:02

## 2018-09-15 RX ADMIN — Medication 10 ML: at 22:00

## 2018-09-15 RX ADMIN — HYDROMORPHONE HYDROCHLORIDE 1 MG: 2 INJECTION, SOLUTION INTRAMUSCULAR; INTRAVENOUS; SUBCUTANEOUS at 19:03

## 2018-09-15 RX ADMIN — FOLIC ACID 1 MG: 1 TABLET ORAL at 09:32

## 2018-09-15 RX ADMIN — HYDROMORPHONE HYDROCHLORIDE 1 MG: 2 INJECTION, SOLUTION INTRAMUSCULAR; INTRAVENOUS; SUBCUTANEOUS at 03:09

## 2018-09-15 NOTE — PROGRESS NOTES
General Daily Progress Note Admit Date: 9/8/2018 Subjective:  
 
Patient complains of increasing pain in her shoulders and back. Current Facility-Administered Medications Medication Dose Route Frequency  dextrose 5% - 0.45% NaCl with KCl 20 mEq/L infusion  100 mL/hr IntraVENous CONTINUOUS  
 sucralfate (CARAFATE) tablet 1 g  1 g Oral AC&HS  pantoprazole (PROTONIX) tablet 40 mg  40 mg Oral ACB  citalopram (CELEXA) tablet 10 mg  10 mg Oral DAILY  folic acid (FOLVITE) tablet 1 mg  1 mg Oral DAILY  polyethylene glycol (MIRALAX) packet 17 g  17 g Oral DAILY  enoxaparin (LOVENOX) injection 40 mg  40 mg SubCUTAneous Q24H  
 saline peripheral flush soln 10 mL  10 mL InterCATHeter PRN  
 SALINE PERIPHERAL FLUSH Q8H soln 10 mL  10 mL InterCATHeter Q8H  
 acetaminophen (TYLENOL) tablet 650 mg  650 mg Oral Q4H PRN  
 sodium chloride (NS) flush 5-10 mL  5-10 mL IntraVENous Q8H  
 sodium chloride (NS) flush 5-10 mL  5-10 mL IntraVENous PRN  
 naloxone (NARCAN) injection 0.4 mg  0.4 mg IntraVENous PRN  
 diphenhydrAMINE (BENADRYL) injection 12.5 mg  12.5 mg IntraVENous Q4H PRN  
 ondansetron (ZOFRAN) injection 4 mg  4 mg IntraVENous Q4H PRN  
 bisacodyl (DULCOLAX) suppository 10 mg  10 mg Rectal DAILY PRN  
 HYDROmorphone (PF) (DILAUDID) injection 1 mg  1 mg IntraVENous Q3H PRN  prochlorperazine (COMPAZINE) with saline injection 10 mg  10 mg IntraVENous Q6H PRN Review of Systems A comprehensive review of systems was negative. Objective:  
 
Patient Vitals for the past 24 hrs: 
 BP Temp Pulse Resp SpO2  
09/15/18 0825 121/71 99 °F (37.2 °C) 86 16 99 % 09/15/18 0319 101/56 99.4 °F (37.4 °C) 78 16 100 % 09/14/18 2220 120/71 98.6 °F (37 °C) 83 18 100 % 09/14/18 1918 124/71 98.6 °F (37 °C) 85 20 100 % 09/14/18 1514 127/44 98.7 °F (37.1 °C) 80 18 100 % 09/14/18 1056 133/88 98.5 °F (36.9 °C) 81 18 100 % 09/14/18 1050 - - - - 100 % 09/13 1901 - 09/15 0700 In: 6168.4 [P.O.:980; I.V.:5188.4] Out: - Physical Exam:  
Visit Vitals  /71 (BP 1 Location: Left arm, BP Patient Position: Sitting)  Pulse 86  Temp 99 °F (37.2 °C)  Resp 16  
 Ht 5' 11\" (1.803 m)  Wt 185 lb (83.9 kg)  SpO2 99%  BMI 25.8 kg/m2 General appearance: alert, cooperative, no distress, appears stated age Neck: supple, symmetrical, trachea midline, no adenopathy, thyroid: not enlarged, symmetric, no tenderness/mass/nodules, no carotid bruit and no JVD Lungs: clear to auscultation bilaterally Heart: regular rate and rhythm, S1, S2 normal, no murmur, click, rub or gallop Abdomen: soft, non-tender. Bowel sounds normal. No masses,  no organomegaly Extremities: extremities normal, atraumatic, no cyanosis or edema Data Review No results found for this or any previous visit (from the past 24 hour(s)). Assessment:  
 
Principal Problem: 
  Sickle cell pain crisis ([facility] (9/8/2018) Active Problems: Hypertension (10/3/2014) Depression (10/5/2014) Nausea and vomiting (7/15/2018) Plan: 1. Pain is indeed increased. Continue pain control for sickle cell crisis. 2.  Significant improvement in GI symptoms. 3.  Will mobilize.

## 2018-09-16 LAB
BASOPHILS # BLD: 0.1 K/UL (ref 0–0.1)
BASOPHILS NFR BLD: 1 % (ref 0–1)
BLASTS NFR BLD MANUAL: 0 %
DIFFERENTIAL METHOD BLD: ABNORMAL
EOSINOPHIL # BLD: 0.2 K/UL (ref 0–0.4)
EOSINOPHIL NFR BLD: 2 % (ref 0–7)
ERYTHROCYTE [DISTWIDTH] IN BLOOD BY AUTOMATED COUNT: 14.5 % (ref 11.5–14.5)
HCT VFR BLD AUTO: 20.9 % (ref 35–47)
HGB BLD-MCNC: 7.5 G/DL (ref 11.5–16)
IMM GRANULOCYTES # BLD: 0 K/UL
IMM GRANULOCYTES NFR BLD AUTO: 0 %
LYMPHOCYTES # BLD: 4 K/UL (ref 0.8–3.5)
LYMPHOCYTES NFR BLD: 52 % (ref 12–49)
MCH RBC QN AUTO: 33.3 PG (ref 26–34)
MCHC RBC AUTO-ENTMCNC: 35.9 G/DL (ref 30–36.5)
MCV RBC AUTO: 92.9 FL (ref 80–99)
METAMYELOCYTES NFR BLD MANUAL: 0 %
MONOCYTES # BLD: 1.3 K/UL (ref 0–1)
MONOCYTES NFR BLD: 17 % (ref 5–13)
MYELOCYTES NFR BLD MANUAL: 0 %
NEUTS BAND NFR BLD MANUAL: 1 % (ref 0–6)
NEUTS SEG # BLD: 2.2 K/UL (ref 1.8–8)
NEUTS SEG NFR BLD: 27 % (ref 32–75)
NRBC # BLD: 0 K/UL (ref 0–0.01)
NRBC BLD-RTO: 0 PER 100 WBC
OTHER CELLS NFR BLD MANUAL: 0 %
PLATELET # BLD AUTO: 265 K/UL (ref 150–400)
PMV BLD AUTO: 12.2 FL (ref 8.9–12.9)
PROMYELOCYTES NFR BLD MANUAL: 0 %
RBC # BLD AUTO: 2.25 M/UL (ref 3.8–5.2)
RBC MORPH BLD: ABNORMAL
RBC MORPH BLD: ABNORMAL
WBC # BLD AUTO: 7.8 K/UL (ref 3.6–11)

## 2018-09-16 PROCEDURE — 74011000250 HC RX REV CODE- 250: Performed by: INTERNAL MEDICINE

## 2018-09-16 PROCEDURE — 85027 COMPLETE CBC AUTOMATED: CPT | Performed by: INTERNAL MEDICINE

## 2018-09-16 PROCEDURE — 74011250637 HC RX REV CODE- 250/637: Performed by: INTERNAL MEDICINE

## 2018-09-16 PROCEDURE — 65270000029 HC RM PRIVATE

## 2018-09-16 PROCEDURE — 94760 N-INVAS EAR/PLS OXIMETRY 1: CPT

## 2018-09-16 PROCEDURE — 74011250636 HC RX REV CODE- 250/636: Performed by: INTERNAL MEDICINE

## 2018-09-16 PROCEDURE — 36415 COLL VENOUS BLD VENIPUNCTURE: CPT | Performed by: INTERNAL MEDICINE

## 2018-09-16 RX ORDER — FENTANYL 50 UG/1
1 PATCH TRANSDERMAL
Status: DISCONTINUED | OUTPATIENT
Start: 2018-09-16 | End: 2018-09-16

## 2018-09-16 RX ORDER — FENTANYL 75 UG/H
1 PATCH TRANSDERMAL
Status: DISCONTINUED | OUTPATIENT
Start: 2018-09-16 | End: 2018-09-20 | Stop reason: HOSPADM

## 2018-09-16 RX ADMIN — SUCRALFATE 1 G: 1 TABLET ORAL at 11:44

## 2018-09-16 RX ADMIN — FOLIC ACID 1 MG: 1 TABLET ORAL at 08:29

## 2018-09-16 RX ADMIN — Medication 10 ML: at 22:00

## 2018-09-16 RX ADMIN — ONDANSETRON 4 MG: 2 INJECTION INTRAMUSCULAR; INTRAVENOUS at 08:29

## 2018-09-16 RX ADMIN — HYDROMORPHONE HYDROCHLORIDE 1 MG: 2 INJECTION, SOLUTION INTRAMUSCULAR; INTRAVENOUS; SUBCUTANEOUS at 02:08

## 2018-09-16 RX ADMIN — ONDANSETRON 4 MG: 2 INJECTION INTRAMUSCULAR; INTRAVENOUS at 15:37

## 2018-09-16 RX ADMIN — CITALOPRAM HYDROBROMIDE 10 MG: 20 TABLET ORAL at 08:34

## 2018-09-16 RX ADMIN — Medication 10 ML: at 05:22

## 2018-09-16 RX ADMIN — DIPHENHYDRAMINE HYDROCHLORIDE 12.5 MG: 50 INJECTION, SOLUTION INTRAMUSCULAR; INTRAVENOUS at 08:29

## 2018-09-16 RX ADMIN — PROCHLORPERAZINE EDISYLATE 10 MG: 5 INJECTION INTRAMUSCULAR; INTRAVENOUS at 18:41

## 2018-09-16 RX ADMIN — DIPHENHYDRAMINE HYDROCHLORIDE 12.5 MG: 50 INJECTION, SOLUTION INTRAMUSCULAR; INTRAVENOUS at 05:22

## 2018-09-16 RX ADMIN — PANTOPRAZOLE SODIUM 40 MG: 40 TABLET, DELAYED RELEASE ORAL at 06:37

## 2018-09-16 RX ADMIN — DEXTROSE MONOHYDRATE, SODIUM CHLORIDE, AND POTASSIUM CHLORIDE 100 ML/HR: 50; 4.5; 1.49 INJECTION, SOLUTION INTRAVENOUS at 02:11

## 2018-09-16 RX ADMIN — DIPHENHYDRAMINE HYDROCHLORIDE 12.5 MG: 50 INJECTION, SOLUTION INTRAMUSCULAR; INTRAVENOUS at 15:37

## 2018-09-16 RX ADMIN — HYDROMORPHONE HYDROCHLORIDE 1 MG: 2 INJECTION, SOLUTION INTRAMUSCULAR; INTRAVENOUS; SUBCUTANEOUS at 21:40

## 2018-09-16 RX ADMIN — SUCRALFATE 1 G: 1 TABLET ORAL at 06:37

## 2018-09-16 RX ADMIN — HYDROMORPHONE HYDROCHLORIDE 1 MG: 2 INJECTION, SOLUTION INTRAMUSCULAR; INTRAVENOUS; SUBCUTANEOUS at 11:44

## 2018-09-16 RX ADMIN — HYDROMORPHONE HYDROCHLORIDE 1 MG: 2 INJECTION, SOLUTION INTRAMUSCULAR; INTRAVENOUS; SUBCUTANEOUS at 08:28

## 2018-09-16 RX ADMIN — ONDANSETRON 4 MG: 2 INJECTION INTRAMUSCULAR; INTRAVENOUS at 02:11

## 2018-09-16 RX ADMIN — ONDANSETRON 4 MG: 2 INJECTION INTRAMUSCULAR; INTRAVENOUS at 21:40

## 2018-09-16 RX ADMIN — HYDROMORPHONE HYDROCHLORIDE 1 MG: 2 INJECTION, SOLUTION INTRAMUSCULAR; INTRAVENOUS; SUBCUTANEOUS at 05:22

## 2018-09-16 RX ADMIN — SUCRALFATE 1 G: 1 TABLET ORAL at 18:41

## 2018-09-16 RX ADMIN — ENOXAPARIN SODIUM 40 MG: 40 INJECTION, SOLUTION INTRAVENOUS; SUBCUTANEOUS at 15:37

## 2018-09-16 RX ADMIN — SUCRALFATE 1 G: 1 TABLET ORAL at 21:48

## 2018-09-16 RX ADMIN — HYDROMORPHONE HYDROCHLORIDE 1 MG: 2 INJECTION, SOLUTION INTRAMUSCULAR; INTRAVENOUS; SUBCUTANEOUS at 15:37

## 2018-09-16 RX ADMIN — HYDROMORPHONE HYDROCHLORIDE 1 MG: 2 INJECTION, SOLUTION INTRAMUSCULAR; INTRAVENOUS; SUBCUTANEOUS at 18:41

## 2018-09-16 RX ADMIN — POLYETHYLENE GLYCOL 3350 17 G: 17 POWDER, FOR SOLUTION ORAL at 08:29

## 2018-09-16 NOTE — PROGRESS NOTES
General Daily Progress Note Admit Date: 9/8/2018 Subjective:  
 
Patient conts to c/o increasing pain. Current Facility-Administered Medications Medication Dose Route Frequency  fentaNYL (DURAGESIC) 50 mcg/hr patch 1 Patch  1 Patch TransDERmal Q72H  fentaNYL (DURAGESIC) 75 mcg/hr patch 1 Patch  1 Patch TransDERmal Q72H  dextrose 5% - 0.45% NaCl with KCl 20 mEq/L infusion  75 mL/hr IntraVENous CONTINUOUS  
 sucralfate (CARAFATE) tablet 1 g  1 g Oral AC&HS  pantoprazole (PROTONIX) tablet 40 mg  40 mg Oral ACB  citalopram (CELEXA) tablet 10 mg  10 mg Oral DAILY  folic acid (FOLVITE) tablet 1 mg  1 mg Oral DAILY  polyethylene glycol (MIRALAX) packet 17 g  17 g Oral DAILY  enoxaparin (LOVENOX) injection 40 mg  40 mg SubCUTAneous Q24H  
 saline peripheral flush soln 10 mL  10 mL InterCATHeter PRN  
 SALINE PERIPHERAL FLUSH Q8H soln 10 mL  10 mL InterCATHeter Q8H  
 acetaminophen (TYLENOL) tablet 650 mg  650 mg Oral Q4H PRN  
 sodium chloride (NS) flush 5-10 mL  5-10 mL IntraVENous Q8H  
 sodium chloride (NS) flush 5-10 mL  5-10 mL IntraVENous PRN  
 naloxone (NARCAN) injection 0.4 mg  0.4 mg IntraVENous PRN  
 diphenhydrAMINE (BENADRYL) injection 12.5 mg  12.5 mg IntraVENous Q4H PRN  
 ondansetron (ZOFRAN) injection 4 mg  4 mg IntraVENous Q4H PRN  
 bisacodyl (DULCOLAX) suppository 10 mg  10 mg Rectal DAILY PRN  
 HYDROmorphone (PF) (DILAUDID) injection 1 mg  1 mg IntraVENous Q3H PRN  prochlorperazine (COMPAZINE) with saline injection 10 mg  10 mg IntraVENous Q6H PRN Review of Systems A comprehensive review of systems was negative. Objective:  
 
Patient Vitals for the past 24 hrs: 
 BP Temp Pulse Resp SpO2  
09/16/18 0321 103/53 99.5 °F (37.5 °C) 97 16 100 % 09/15/18 2322 119/64 99.8 °F (37.7 °C) - 16 100 % 09/15/18 1931 114/69 99.4 °F (37.4 °C) 94 16 100 % 09/15/18 1501 126/90 98.7 °F (37.1 °C) 90 16 99 % 09/15/18 1118 124/71 98.4 °F (36.9 °C) 86 16 99 % 09/14 1901 - 09/16 0700 In: 3411.7 [P.O.:600; I.V.:2811.7] Out: 600 [Urine:600] Physical Exam:  
Visit Vitals  /53  Pulse 97  Temp 99.5 °F (37.5 °C)  Resp 16  
 Ht 5' 11\" (1.803 m)  Wt 185 lb (83.9 kg)  SpO2 100%  BMI 25.8 kg/m2 General appearance: alert, cooperative, no distress, appears stated age Neck: supple, symmetrical, trachea midline, no adenopathy, thyroid: not enlarged, symmetric, no tenderness/mass/nodules, no carotid bruit and no JVD Lungs: clear to auscultation bilaterally Heart: regular rate and rhythm, S1, S2 normal, no murmur, click, rub or gallop Abdomen: soft, non-tender. Bowel sounds normal. No masses,  no organomegaly Extremities: extremities normal, atraumatic, no cyanosis or edema Data Review Recent Results (from the past 24 hour(s)) CBC WITH MANUAL DIFF Collection Time: 09/16/18  3:20 AM  
Result Value Ref Range WBC 7.8 3.6 - 11.0 K/uL  
 RBC 2.25 (L) 3.80 - 5.20 M/uL HGB 7.5 (L) 11.5 - 16.0 g/dL HCT 20.9 (L) 35.0 - 47.0 % MCV 92.9 80.0 - 99.0 FL  
 MCH 33.3 26.0 - 34.0 PG  
 MCHC 35.9 30.0 - 36.5 g/dL  
 RDW 14.5 11.5 - 14.5 % PLATELET 424 846 - 947 K/uL MPV 12.2 8.9 - 12.9 FL  
 NRBC 0.0 0  WBC ABSOLUTE NRBC 0.00 0.00 - 0.01 K/uL NEUTROPHILS 27 (L) 32 - 75 % BAND NEUTROPHILS 1 0 - 6 % LYMPHOCYTES 52 (H) 12 - 49 % MONOCYTES 17 (H) 5 - 13 % EOSINOPHILS 2 0 - 7 % BASOPHILS 1 0 - 1 % METAMYELOCYTES 0 0 % MYELOCYTES 0 0 % PROMYELOCYTES 0 0 % BLASTS 0 0 % OTHER CELL 0 0 IMMATURE GRANULOCYTES 0 %  
 ABS. NEUTROPHILS 2.2 1.8 - 8.0 K/UL  
 ABS. LYMPHOCYTES 4.0 (H) 0.8 - 3.5 K/UL  
 ABS. MONOCYTES 1.3 (H) 0.0 - 1.0 K/UL  
 ABS. EOSINOPHILS 0.2 0.0 - 0.4 K/UL  
 ABS. BASOPHILS 0.1 0.0 - 0.1 K/UL  
 ABS. IMM. GRANS. 0.0 K/UL  
 DF MANUAL    
 RBC COMMENTS ANISOCYTOSIS 1+ 
    
 RBC COMMENTS TARGET CELLS 
PRESENT 
    
 
 
   
 Assessment:  
 
Principal Problem: 
  Sickle cell pain crisis ([facility] [address] (9/8/2018) Active Problems: Hypertension (10/3/2014) Depression (10/5/2014) Nausea and vomiting (7/15/2018) Plan: 1. Continue treatment of painful crises and will add Duragesic patch which she was taking prior to admission. 2.  GI symptoms have improved significantly allowing for an adequate p.o. Intake. 3.  Will mobilize.

## 2018-09-16 NOTE — PROGRESS NOTES
Problem: Falls - Risk of 
Goal: *Absence of Falls Document Chantal President Fall Risk and appropriate interventions in the flowsheet. Fall Risk Interventions: 
  
 
  
 
Medication Interventions: Teach patient to arise slowly Elimination Interventions: Call light in reach

## 2018-09-17 LAB
ANION GAP SERPL CALC-SCNC: 6 MMOL/L (ref 5–15)
BUN SERPL-MCNC: 3 MG/DL (ref 6–20)
BUN/CREAT SERPL: 5 (ref 12–20)
CALCIUM SERPL-MCNC: 8.3 MG/DL (ref 8.5–10.1)
CHLORIDE SERPL-SCNC: 105 MMOL/L (ref 97–108)
CO2 SERPL-SCNC: 29 MMOL/L (ref 21–32)
CREAT SERPL-MCNC: 0.63 MG/DL (ref 0.55–1.02)
GLUCOSE SERPL-MCNC: 92 MG/DL (ref 65–100)
POTASSIUM SERPL-SCNC: 4.2 MMOL/L (ref 3.5–5.1)
SODIUM SERPL-SCNC: 140 MMOL/L (ref 136–145)

## 2018-09-17 PROCEDURE — 74011250636 HC RX REV CODE- 250/636: Performed by: INTERNAL MEDICINE

## 2018-09-17 PROCEDURE — 80048 BASIC METABOLIC PNL TOTAL CA: CPT | Performed by: INTERNAL MEDICINE

## 2018-09-17 PROCEDURE — 74011000250 HC RX REV CODE- 250: Performed by: INTERNAL MEDICINE

## 2018-09-17 PROCEDURE — 36415 COLL VENOUS BLD VENIPUNCTURE: CPT | Performed by: INTERNAL MEDICINE

## 2018-09-17 PROCEDURE — 94760 N-INVAS EAR/PLS OXIMETRY 1: CPT

## 2018-09-17 PROCEDURE — 65270000029 HC RM PRIVATE

## 2018-09-17 PROCEDURE — 74011250637 HC RX REV CODE- 250/637: Performed by: INTERNAL MEDICINE

## 2018-09-17 RX ADMIN — ONDANSETRON 4 MG: 2 INJECTION INTRAMUSCULAR; INTRAVENOUS at 18:28

## 2018-09-17 RX ADMIN — ONDANSETRON 4 MG: 2 INJECTION INTRAMUSCULAR; INTRAVENOUS at 22:24

## 2018-09-17 RX ADMIN — SUCRALFATE 1 G: 1 TABLET ORAL at 21:07

## 2018-09-17 RX ADMIN — POLYETHYLENE GLYCOL 3350 17 G: 17 POWDER, FOR SOLUTION ORAL at 08:56

## 2018-09-17 RX ADMIN — SUCRALFATE 1 G: 1 TABLET ORAL at 05:54

## 2018-09-17 RX ADMIN — FOLIC ACID 1 MG: 1 TABLET ORAL at 08:56

## 2018-09-17 RX ADMIN — HYDROMORPHONE HYDROCHLORIDE 1 MG: 2 INJECTION, SOLUTION INTRAMUSCULAR; INTRAVENOUS; SUBCUTANEOUS at 02:36

## 2018-09-17 RX ADMIN — HYDROMORPHONE HYDROCHLORIDE 1 MG: 2 INJECTION, SOLUTION INTRAMUSCULAR; INTRAVENOUS; SUBCUTANEOUS at 15:13

## 2018-09-17 RX ADMIN — DIPHENHYDRAMINE HYDROCHLORIDE 12.5 MG: 50 INJECTION, SOLUTION INTRAMUSCULAR; INTRAVENOUS at 08:56

## 2018-09-17 RX ADMIN — Medication 10 ML: at 05:53

## 2018-09-17 RX ADMIN — Medication 10 ML: at 05:54

## 2018-09-17 RX ADMIN — SUCRALFATE 1 G: 1 TABLET ORAL at 16:47

## 2018-09-17 RX ADMIN — HYDROMORPHONE HYDROCHLORIDE 1 MG: 2 INJECTION, SOLUTION INTRAMUSCULAR; INTRAVENOUS; SUBCUTANEOUS at 22:23

## 2018-09-17 RX ADMIN — ONDANSETRON 4 MG: 2 INJECTION INTRAMUSCULAR; INTRAVENOUS at 08:56

## 2018-09-17 RX ADMIN — SUCRALFATE 1 G: 1 TABLET ORAL at 12:48

## 2018-09-17 RX ADMIN — HYDROMORPHONE HYDROCHLORIDE 1 MG: 2 INJECTION, SOLUTION INTRAMUSCULAR; INTRAVENOUS; SUBCUTANEOUS at 12:01

## 2018-09-17 RX ADMIN — DIPHENHYDRAMINE HYDROCHLORIDE 12.5 MG: 50 INJECTION, SOLUTION INTRAMUSCULAR; INTRAVENOUS at 22:24

## 2018-09-17 RX ADMIN — HYDROMORPHONE HYDROCHLORIDE 1 MG: 2 INJECTION, SOLUTION INTRAMUSCULAR; INTRAVENOUS; SUBCUTANEOUS at 18:25

## 2018-09-17 RX ADMIN — DEXTROSE MONOHYDRATE, SODIUM CHLORIDE, AND POTASSIUM CHLORIDE 75 ML/HR: 50; 4.5; 1.49 INJECTION, SOLUTION INTRAVENOUS at 02:36

## 2018-09-17 RX ADMIN — HYDROMORPHONE HYDROCHLORIDE 1 MG: 2 INJECTION, SOLUTION INTRAMUSCULAR; INTRAVENOUS; SUBCUTANEOUS at 05:53

## 2018-09-17 RX ADMIN — DIPHENHYDRAMINE HYDROCHLORIDE 12.5 MG: 50 INJECTION, SOLUTION INTRAMUSCULAR; INTRAVENOUS at 15:13

## 2018-09-17 RX ADMIN — HYDROMORPHONE HYDROCHLORIDE 1 MG: 2 INJECTION, SOLUTION INTRAMUSCULAR; INTRAVENOUS; SUBCUTANEOUS at 08:56

## 2018-09-17 RX ADMIN — PROCHLORPERAZINE EDISYLATE 10 MG: 5 INJECTION INTRAMUSCULAR; INTRAVENOUS at 15:13

## 2018-09-17 RX ADMIN — PROCHLORPERAZINE EDISYLATE 10 MG: 5 INJECTION INTRAMUSCULAR; INTRAVENOUS at 05:52

## 2018-09-17 RX ADMIN — ENOXAPARIN SODIUM 40 MG: 40 INJECTION, SOLUTION INTRAVENOUS; SUBCUTANEOUS at 15:19

## 2018-09-17 RX ADMIN — Medication 10 ML: at 08:57

## 2018-09-17 RX ADMIN — PANTOPRAZOLE SODIUM 40 MG: 40 TABLET, DELAYED RELEASE ORAL at 05:55

## 2018-09-17 RX ADMIN — CITALOPRAM HYDROBROMIDE 10 MG: 20 TABLET ORAL at 08:56

## 2018-09-17 RX ADMIN — DEXTROSE MONOHYDRATE, SODIUM CHLORIDE, AND POTASSIUM CHLORIDE 75 ML/HR: 50; 4.5; 1.49 INJECTION, SOLUTION INTRAVENOUS at 17:50

## 2018-09-17 RX ADMIN — Medication 10 ML: at 21:08

## 2018-09-17 RX ADMIN — Medication 10 ML: at 08:56

## 2018-09-17 NOTE — PROGRESS NOTES
General Daily Progress Note Admit Date: 9/8/2018 Subjective:  
 
Patient continues with pain but improved since the addition of the Duragesic patch. Current Facility-Administered Medications Medication Dose Route Frequency  fentaNYL (DURAGESIC) 75 mcg/hr patch 1 Patch  1 Patch TransDERmal Q72H  dextrose 5% - 0.45% NaCl with KCl 20 mEq/L infusion  75 mL/hr IntraVENous CONTINUOUS  
 sucralfate (CARAFATE) tablet 1 g  1 g Oral AC&HS  pantoprazole (PROTONIX) tablet 40 mg  40 mg Oral ACB  citalopram (CELEXA) tablet 10 mg  10 mg Oral DAILY  folic acid (FOLVITE) tablet 1 mg  1 mg Oral DAILY  polyethylene glycol (MIRALAX) packet 17 g  17 g Oral DAILY  enoxaparin (LOVENOX) injection 40 mg  40 mg SubCUTAneous Q24H  
 saline peripheral flush soln 10 mL  10 mL InterCATHeter PRN  
 SALINE PERIPHERAL FLUSH Q8H soln 10 mL  10 mL InterCATHeter Q8H  
 acetaminophen (TYLENOL) tablet 650 mg  650 mg Oral Q4H PRN  
 sodium chloride (NS) flush 5-10 mL  5-10 mL IntraVENous Q8H  
 sodium chloride (NS) flush 5-10 mL  5-10 mL IntraVENous PRN  
 naloxone (NARCAN) injection 0.4 mg  0.4 mg IntraVENous PRN  
 diphenhydrAMINE (BENADRYL) injection 12.5 mg  12.5 mg IntraVENous Q4H PRN  
 ondansetron (ZOFRAN) injection 4 mg  4 mg IntraVENous Q4H PRN  
 bisacodyl (DULCOLAX) suppository 10 mg  10 mg Rectal DAILY PRN  
 HYDROmorphone (PF) (DILAUDID) injection 1 mg  1 mg IntraVENous Q3H PRN  prochlorperazine (COMPAZINE) with saline injection 10 mg  10 mg IntraVENous Q6H PRN Review of Systems A comprehensive review of systems was negative. Objective:  
 
Patient Vitals for the past 24 hrs: 
 BP Temp Pulse Resp SpO2  
09/17/18 0801 120/71 98.5 °F (36.9 °C) 78 18 99 % 09/17/18 0304 122/74 99.3 °F (37.4 °C) 90 18 98 % 09/16/18 2321 95/50 99.4 °F (37.4 °C) 94 16 97 % 09/16/18 1927 105/50 98.9 °F (37.2 °C) 91 17 100 % 09/16/18 1555 102/79 98.1 °F (36.7 °C) 99 18 100 % 09/15 1901 - 09/17 0700 In: 3859.2 [P.O.:1200; I.V.:2659.2] Out: - Physical Exam:  
Visit Vitals  /71 (BP 1 Location: Left arm, BP Patient Position: Sitting)  Pulse 78  Temp 98.5 °F (36.9 °C)  Resp 18  Ht 5' 11\" (1.803 m)  Wt 185 lb (83.9 kg)  SpO2 99%  BMI 25.8 kg/m2 General appearance: alert, cooperative, no distress, appears stated age Neck: supple, symmetrical, trachea midline, no adenopathy, thyroid: not enlarged, symmetric, no tenderness/mass/nodules, no carotid bruit and no JVD Lungs: clear to auscultation bilaterally Heart: regular rate and rhythm, S1, S2 normal, no murmur, click, rub or gallop Abdomen: soft, non-tender. Bowel sounds normal. No masses,  no organomegaly Extremities: extremities normal, atraumatic, no cyanosis or edema Data Review Recent Results (from the past 24 hour(s)) METABOLIC PANEL, BASIC Collection Time: 09/17/18  2:39 AM  
Result Value Ref Range Sodium 140 136 - 145 mmol/L Potassium 4.2 3.5 - 5.1 mmol/L Chloride 105 97 - 108 mmol/L  
 CO2 29 21 - 32 mmol/L Anion gap 6 5 - 15 mmol/L Glucose 92 65 - 100 mg/dL BUN 3 (L) 6 - 20 MG/DL Creatinine 0.63 0.55 - 1.02 MG/DL  
 BUN/Creatinine ratio 5 (L) 12 - 20 GFR est AA >60 >60 ml/min/1.73m2 GFR est non-AA >60 >60 ml/min/1.73m2 Calcium 8.3 (L) 8.5 - 10.1 MG/DL Assessment:  
 
Principal Problem: 
  Sickle cell pain crisis ([facility] [address] (9/8/2018) Active Problems: Hypertension (10/3/2014) Depression (10/5/2014) Nausea and vomiting (7/15/2018) Plan: 1. Continue treatment of painful crises. Plan to begin reducing analgesics tomorrow with discharge hopefully on Wednesday. 2.  Hydration continues. 3.  Low-grade fever noted and most likely related to her sickle cell crisis. Antibiotic discontinued. 4.  Will mobilize.

## 2018-09-17 NOTE — PROGRESS NOTES
Problem: Falls - Risk of 
Goal: *Absence of Falls Document Earl Ja Fall Risk and appropriate interventions in the flowsheet. Outcome: Progressing Towards Goal 
Fall Risk Interventions: 
  
 
  
 
Medication Interventions: Teach patient to arise slowly Elimination Interventions: Call light in reach Comments: Constipation/ on Miralax GIANNA Midline Taking Dilaudid IV, Benadryl, Zofran, Compazine/ added Fentanyl patch yest. For Q3D 
 
D/C THURS?

## 2018-09-17 NOTE — PROGRESS NOTES
Problem: Falls - Risk of 
Goal: *Absence of Falls Document Amirah Canela Fall Risk and appropriate interventions in the flowsheet. Outcome: Progressing Towards Goal 
Fall Risk Interventions: 
  
 
  
 
Medication Interventions: Teach patient to arise slowly Elimination Interventions: Call light in reach

## 2018-09-18 PROCEDURE — 74011250636 HC RX REV CODE- 250/636: Performed by: INTERNAL MEDICINE

## 2018-09-18 PROCEDURE — 74011250637 HC RX REV CODE- 250/637: Performed by: INTERNAL MEDICINE

## 2018-09-18 PROCEDURE — 74011000250 HC RX REV CODE- 250: Performed by: INTERNAL MEDICINE

## 2018-09-18 PROCEDURE — 65270000029 HC RM PRIVATE

## 2018-09-18 PROCEDURE — 94760 N-INVAS EAR/PLS OXIMETRY 1: CPT

## 2018-09-18 RX ORDER — HYDROCODONE BITARTRATE AND ACETAMINOPHEN 10; 325 MG/1; MG/1
1 TABLET ORAL
Status: DISCONTINUED | OUTPATIENT
Start: 2018-09-18 | End: 2018-09-20 | Stop reason: HOSPADM

## 2018-09-18 RX ORDER — HYDROMORPHONE HYDROCHLORIDE 2 MG/ML
1 INJECTION, SOLUTION INTRAMUSCULAR; INTRAVENOUS; SUBCUTANEOUS
Status: DISCONTINUED | OUTPATIENT
Start: 2018-09-18 | End: 2018-09-20 | Stop reason: HOSPADM

## 2018-09-18 RX ADMIN — DEXTROSE MONOHYDRATE, SODIUM CHLORIDE, AND POTASSIUM CHLORIDE 75 ML/HR: 50; 4.5; 1.49 INJECTION, SOLUTION INTRAVENOUS at 06:53

## 2018-09-18 RX ADMIN — PANTOPRAZOLE SODIUM 40 MG: 40 TABLET, DELAYED RELEASE ORAL at 06:54

## 2018-09-18 RX ADMIN — DIPHENHYDRAMINE HYDROCHLORIDE 12.5 MG: 50 INJECTION, SOLUTION INTRAMUSCULAR; INTRAVENOUS at 06:54

## 2018-09-18 RX ADMIN — HYDROMORPHONE HYDROCHLORIDE 1 MG: 2 INJECTION, SOLUTION INTRAMUSCULAR; INTRAVENOUS; SUBCUTANEOUS at 15:18

## 2018-09-18 RX ADMIN — SUCRALFATE 1 G: 1 TABLET ORAL at 21:26

## 2018-09-18 RX ADMIN — Medication 10 ML: at 21:28

## 2018-09-18 RX ADMIN — HYDROCODONE BITARTRATE AND ACETAMINOPHEN 1 TABLET: 10; 325 TABLET ORAL at 11:13

## 2018-09-18 RX ADMIN — DIPHENHYDRAMINE HYDROCHLORIDE 12.5 MG: 50 INJECTION, SOLUTION INTRAMUSCULAR; INTRAVENOUS at 11:14

## 2018-09-18 RX ADMIN — SUCRALFATE 1 G: 1 TABLET ORAL at 06:53

## 2018-09-18 RX ADMIN — SUCRALFATE 1 G: 1 TABLET ORAL at 11:18

## 2018-09-18 RX ADMIN — SUCRALFATE 1 G: 1 TABLET ORAL at 17:38

## 2018-09-18 RX ADMIN — HYDROMORPHONE HYDROCHLORIDE 1 MG: 2 INJECTION, SOLUTION INTRAMUSCULAR; INTRAVENOUS; SUBCUTANEOUS at 21:28

## 2018-09-18 RX ADMIN — Medication 10 ML: at 15:19

## 2018-09-18 RX ADMIN — HYDROMORPHONE HYDROCHLORIDE 1 MG: 2 INJECTION, SOLUTION INTRAMUSCULAR; INTRAVENOUS; SUBCUTANEOUS at 06:54

## 2018-09-18 RX ADMIN — PROCHLORPERAZINE EDISYLATE 10 MG: 5 INJECTION INTRAMUSCULAR; INTRAVENOUS at 21:21

## 2018-09-18 RX ADMIN — DIPHENHYDRAMINE HYDROCHLORIDE 12.5 MG: 50 INJECTION, SOLUTION INTRAMUSCULAR; INTRAVENOUS at 17:37

## 2018-09-18 RX ADMIN — FOLIC ACID 1 MG: 1 TABLET ORAL at 08:28

## 2018-09-18 RX ADMIN — PROCHLORPERAZINE EDISYLATE 10 MG: 5 INJECTION INTRAMUSCULAR; INTRAVENOUS at 02:00

## 2018-09-18 RX ADMIN — Medication 10 ML: at 06:15

## 2018-09-18 RX ADMIN — ONDANSETRON 4 MG: 2 INJECTION INTRAMUSCULAR; INTRAVENOUS at 11:14

## 2018-09-18 RX ADMIN — POLYETHYLENE GLYCOL 3350 17 G: 17 POWDER, FOR SOLUTION ORAL at 11:05

## 2018-09-18 RX ADMIN — Medication 10 ML: at 06:00

## 2018-09-18 RX ADMIN — ONDANSETRON 4 MG: 2 INJECTION INTRAMUSCULAR; INTRAVENOUS at 06:54

## 2018-09-18 RX ADMIN — ENOXAPARIN SODIUM 40 MG: 40 INJECTION, SOLUTION INTRAVENOUS; SUBCUTANEOUS at 15:18

## 2018-09-18 RX ADMIN — HYDROMORPHONE HYDROCHLORIDE 1 MG: 2 INJECTION, SOLUTION INTRAMUSCULAR; INTRAVENOUS; SUBCUTANEOUS at 02:00

## 2018-09-18 RX ADMIN — ONDANSETRON 4 MG: 2 INJECTION INTRAMUSCULAR; INTRAVENOUS at 17:37

## 2018-09-18 RX ADMIN — CITALOPRAM HYDROBROMIDE 10 MG: 20 TABLET ORAL at 08:28

## 2018-09-18 NOTE — PROGRESS NOTES
Bedside shift change report given to [employer]Job (oncoming nurse) by Veronica Robb RN (offgoing nurse). Report included the following information SBAR, Kardex, Procedure Summary, Intake/Output, MAR, Accordion, Recent Results and Med Rec Status.

## 2018-09-18 NOTE — PROGRESS NOTES
General Daily Progress Note Admit Date: 9/8/2018 Subjective:  
 
Patient complains of pain but significant improvement. Current Facility-Administered Medications Medication Dose Route Frequency  HYDROmorphone (PF) (DILAUDID) injection 1 mg  1 mg IntraVENous Q6H PRN  
 HYDROcodone-acetaminophen (NORCO)  mg tablet 1 Tab  1 Tab Oral Q4H PRN  
 fentaNYL (DURAGESIC) 75 mcg/hr patch 1 Patch  1 Patch TransDERmal Q72H  dextrose 5% - 0.45% NaCl with KCl 20 mEq/L infusion  50 mL/hr IntraVENous CONTINUOUS  
 sucralfate (CARAFATE) tablet 1 g  1 g Oral AC&HS  pantoprazole (PROTONIX) tablet 40 mg  40 mg Oral ACB  citalopram (CELEXA) tablet 10 mg  10 mg Oral DAILY  folic acid (FOLVITE) tablet 1 mg  1 mg Oral DAILY  polyethylene glycol (MIRALAX) packet 17 g  17 g Oral DAILY  enoxaparin (LOVENOX) injection 40 mg  40 mg SubCUTAneous Q24H  
 saline peripheral flush soln 10 mL  10 mL InterCATHeter PRN  
 SALINE PERIPHERAL FLUSH Q8H soln 10 mL  10 mL InterCATHeter Q8H  
 acetaminophen (TYLENOL) tablet 650 mg  650 mg Oral Q4H PRN  
 sodium chloride (NS) flush 5-10 mL  5-10 mL IntraVENous Q8H  
 sodium chloride (NS) flush 5-10 mL  5-10 mL IntraVENous PRN  
 naloxone (NARCAN) injection 0.4 mg  0.4 mg IntraVENous PRN  
 diphenhydrAMINE (BENADRYL) injection 12.5 mg  12.5 mg IntraVENous Q4H PRN  
 ondansetron (ZOFRAN) injection 4 mg  4 mg IntraVENous Q4H PRN  
 bisacodyl (DULCOLAX) suppository 10 mg  10 mg Rectal DAILY PRN  prochlorperazine (COMPAZINE) with saline injection 10 mg  10 mg IntraVENous Q6H PRN Review of Systems A comprehensive review of systems was negative. Objective:  
 
Patient Vitals for the past 24 hrs: 
 BP Temp Pulse Resp SpO2  
09/18/18 0350 108/67 98.5 °F (36.9 °C) 76 17 94 % 09/17/18 2330 137/70 98.8 °F (37.1 °C) 96 18 99 % 09/17/18 1940 102/51 98.7 °F (37.1 °C) 96 17 96 % 09/17/18 1616 117/74 98.4 °F (36.9 °C) 85 18 100 % 09/17/18 1156 120/72 98.7 °F (37.1 °C) 81 18 100 % 09/18 0701 - 09/18 1900 In: 900 [I.V.:900] Out: - Physical Exam:  
Visit Vitals  /67 (BP 1 Location: Left arm, BP Patient Position: At rest)  Pulse 76  Temp 98.5 °F (36.9 °C)  Resp 17  Ht 5' 11\" (1.803 m)  Wt 185 lb (83.9 kg)  SpO2 94%  BMI 25.8 kg/m2 General appearance: alert, cooperative, no distress, appears stated age Neck: supple, symmetrical, trachea midline, no adenopathy, thyroid: not enlarged, symmetric, no tenderness/mass/nodules, no carotid bruit and no JVD Lungs: clear to auscultation bilaterally Heart: regular rate and rhythm, S1, S2 normal, no murmur, click, rub or gallop Abdomen: soft, non-tender. Bowel sounds normal. No masses,  no organomegaly Extremities: extremities normal, atraumatic, no cyanosis or edema Data Review No results found for this or any previous visit (from the past 24 hour(s)). Assessment:  
 
Principal Problem: 
  Sickle cell pain crisis ([facility] [address] (9/8/2018) Active Problems: Hypertension (10/3/2014) Depression (10/5/2014) Nausea and vomiting (7/15/2018) Plan:  
 
1. We will begin to wean parenteral analgesic.  P.o. analgesic started. Continued observation. 2.  Low-grade fever persist but is no worse. 3.  Will mobilize. Monique Pablo

## 2018-09-18 NOTE — PROGRESS NOTES
PCP KRISTINA appt scheduled with Dr. Barbara Linares on 9/24/2018 at 1:15pm. Appt added to AVS. Prakash Majano CM Specialist

## 2018-09-19 LAB
BASOPHILS # BLD: 0.1 K/UL (ref 0–0.1)
BASOPHILS NFR BLD: 1 % (ref 0–1)
BLASTS NFR BLD MANUAL: 0 %
DIFFERENTIAL METHOD BLD: ABNORMAL
EOSINOPHIL # BLD: 0.4 K/UL (ref 0–0.4)
EOSINOPHIL NFR BLD: 5 % (ref 0–7)
ERYTHROCYTE [DISTWIDTH] IN BLOOD BY AUTOMATED COUNT: 14.6 % (ref 11.5–14.5)
HCT VFR BLD AUTO: 19.3 % (ref 35–47)
HGB BLD-MCNC: 6.8 G/DL (ref 11.5–16)
IMM GRANULOCYTES # BLD: 0 K/UL
IMM GRANULOCYTES NFR BLD AUTO: 0 %
LYMPHOCYTES # BLD: 3.9 K/UL (ref 0.8–3.5)
LYMPHOCYTES NFR BLD: 44 % (ref 12–49)
MCH RBC QN AUTO: 33 PG (ref 26–34)
MCHC RBC AUTO-ENTMCNC: 35.2 G/DL (ref 30–36.5)
MCV RBC AUTO: 93.7 FL (ref 80–99)
METAMYELOCYTES NFR BLD MANUAL: 0 %
MONOCYTES # BLD: 1.5 K/UL (ref 0–1)
MONOCYTES NFR BLD: 17 % (ref 5–13)
MYELOCYTES NFR BLD MANUAL: 0 %
NEUTS BAND NFR BLD MANUAL: 0 % (ref 0–6)
NEUTS SEG # BLD: 2.9 K/UL (ref 1.8–8)
NEUTS SEG NFR BLD: 33 % (ref 32–75)
NRBC # BLD: 0 K/UL (ref 0–0.01)
NRBC BLD-RTO: 0 PER 100 WBC
OTHER CELLS NFR BLD MANUAL: 0 %
PLATELET # BLD AUTO: 258 K/UL (ref 150–400)
PMV BLD AUTO: 12.4 FL (ref 8.9–12.9)
PROMYELOCYTES NFR BLD MANUAL: 0 %
RBC # BLD AUTO: 2.06 M/UL (ref 3.8–5.2)
RBC MORPH BLD: ABNORMAL
RBC MORPH BLD: ABNORMAL
WBC # BLD AUTO: 8.8 K/UL (ref 3.6–11)

## 2018-09-19 PROCEDURE — 74011250637 HC RX REV CODE- 250/637: Performed by: INTERNAL MEDICINE

## 2018-09-19 PROCEDURE — 74011250636 HC RX REV CODE- 250/636: Performed by: INTERNAL MEDICINE

## 2018-09-19 PROCEDURE — 74011000250 HC RX REV CODE- 250: Performed by: INTERNAL MEDICINE

## 2018-09-19 PROCEDURE — 85027 COMPLETE CBC AUTOMATED: CPT | Performed by: INTERNAL MEDICINE

## 2018-09-19 PROCEDURE — 36415 COLL VENOUS BLD VENIPUNCTURE: CPT | Performed by: INTERNAL MEDICINE

## 2018-09-19 PROCEDURE — 65270000029 HC RM PRIVATE

## 2018-09-19 RX ADMIN — SUCRALFATE 1 G: 1 TABLET ORAL at 21:24

## 2018-09-19 RX ADMIN — CITALOPRAM HYDROBROMIDE 10 MG: 20 TABLET ORAL at 09:37

## 2018-09-19 RX ADMIN — Medication 10 ML: at 14:34

## 2018-09-19 RX ADMIN — DIPHENHYDRAMINE HYDROCHLORIDE 12.5 MG: 50 INJECTION, SOLUTION INTRAMUSCULAR; INTRAVENOUS at 09:36

## 2018-09-19 RX ADMIN — SUCRALFATE 1 G: 1 TABLET ORAL at 07:51

## 2018-09-19 RX ADMIN — Medication 10 ML: at 21:24

## 2018-09-19 RX ADMIN — HYDROMORPHONE HYDROCHLORIDE 1 MG: 2 INJECTION, SOLUTION INTRAMUSCULAR; INTRAVENOUS; SUBCUTANEOUS at 21:23

## 2018-09-19 RX ADMIN — PANTOPRAZOLE SODIUM 40 MG: 40 TABLET, DELAYED RELEASE ORAL at 07:51

## 2018-09-19 RX ADMIN — HYDROCODONE BITARTRATE AND ACETAMINOPHEN 1 TABLET: 10; 325 TABLET ORAL at 23:39

## 2018-09-19 RX ADMIN — SUCRALFATE 1 G: 1 TABLET ORAL at 16:55

## 2018-09-19 RX ADMIN — HYDROCODONE BITARTRATE AND ACETAMINOPHEN 1 TABLET: 10; 325 TABLET ORAL at 09:36

## 2018-09-19 RX ADMIN — DIPHENHYDRAMINE HYDROCHLORIDE 12.5 MG: 50 INJECTION, SOLUTION INTRAMUSCULAR; INTRAVENOUS at 21:23

## 2018-09-19 RX ADMIN — POLYETHYLENE GLYCOL 3350 17 G: 17 POWDER, FOR SOLUTION ORAL at 09:37

## 2018-09-19 RX ADMIN — ENOXAPARIN SODIUM 40 MG: 40 INJECTION, SOLUTION INTRAVENOUS; SUBCUTANEOUS at 14:33

## 2018-09-19 RX ADMIN — ONDANSETRON 4 MG: 2 INJECTION INTRAMUSCULAR; INTRAVENOUS at 14:34

## 2018-09-19 RX ADMIN — HYDROCODONE BITARTRATE AND ACETAMINOPHEN 1 TABLET: 10; 325 TABLET ORAL at 16:55

## 2018-09-19 RX ADMIN — DIPHENHYDRAMINE HYDROCHLORIDE 12.5 MG: 50 INJECTION, SOLUTION INTRAMUSCULAR; INTRAVENOUS at 00:09

## 2018-09-19 RX ADMIN — SUCRALFATE 1 G: 1 TABLET ORAL at 11:43

## 2018-09-19 RX ADMIN — ONDANSETRON 4 MG: 2 INJECTION INTRAMUSCULAR; INTRAVENOUS at 00:09

## 2018-09-19 RX ADMIN — ONDANSETRON 4 MG: 2 INJECTION INTRAMUSCULAR; INTRAVENOUS at 09:36

## 2018-09-19 RX ADMIN — DIPHENHYDRAMINE HYDROCHLORIDE 12.5 MG: 50 INJECTION, SOLUTION INTRAMUSCULAR; INTRAVENOUS at 14:33

## 2018-09-19 RX ADMIN — DEXTROSE MONOHYDRATE, SODIUM CHLORIDE, AND POTASSIUM CHLORIDE 50 ML/HR: 50; 4.5; 1.49 INJECTION, SOLUTION INTRAVENOUS at 21:23

## 2018-09-19 RX ADMIN — HYDROMORPHONE HYDROCHLORIDE 1 MG: 2 INJECTION, SOLUTION INTRAMUSCULAR; INTRAVENOUS; SUBCUTANEOUS at 11:40

## 2018-09-19 RX ADMIN — FOLIC ACID 1 MG: 1 TABLET ORAL at 09:37

## 2018-09-19 RX ADMIN — PROCHLORPERAZINE EDISYLATE 10 MG: 5 INJECTION INTRAMUSCULAR; INTRAVENOUS at 11:47

## 2018-09-19 RX ADMIN — DEXTROSE MONOHYDRATE, SODIUM CHLORIDE, AND POTASSIUM CHLORIDE 50 ML/HR: 50; 4.5; 1.49 INJECTION, SOLUTION INTRAVENOUS at 00:09

## 2018-09-19 RX ADMIN — PROCHLORPERAZINE EDISYLATE 10 MG: 5 INJECTION INTRAMUSCULAR; INTRAVENOUS at 21:23

## 2018-09-19 NOTE — PROGRESS NOTES
General Daily Progress Note Admit Date: 9/8/2018 Subjective:  
 
Patient feels better with improving pain. Current Facility-Administered Medications Medication Dose Route Frequency  HYDROmorphone (PF) (DILAUDID) injection 1 mg  1 mg IntraVENous Q6H PRN  
 HYDROcodone-acetaminophen (NORCO)  mg tablet 1 Tab  1 Tab Oral Q4H PRN  
 fentaNYL (DURAGESIC) 75 mcg/hr patch 1 Patch  1 Patch TransDERmal Q72H  dextrose 5% - 0.45% NaCl with KCl 20 mEq/L infusion  50 mL/hr IntraVENous CONTINUOUS  
 sucralfate (CARAFATE) tablet 1 g  1 g Oral AC&HS  pantoprazole (PROTONIX) tablet 40 mg  40 mg Oral ACB  citalopram (CELEXA) tablet 10 mg  10 mg Oral DAILY  folic acid (FOLVITE) tablet 1 mg  1 mg Oral DAILY  polyethylene glycol (MIRALAX) packet 17 g  17 g Oral DAILY  enoxaparin (LOVENOX) injection 40 mg  40 mg SubCUTAneous Q24H  
 saline peripheral flush soln 10 mL  10 mL InterCATHeter PRN  
 SALINE PERIPHERAL FLUSH Q8H soln 10 mL  10 mL InterCATHeter Q8H  
 acetaminophen (TYLENOL) tablet 650 mg  650 mg Oral Q4H PRN  
 sodium chloride (NS) flush 5-10 mL  5-10 mL IntraVENous Q8H  
 sodium chloride (NS) flush 5-10 mL  5-10 mL IntraVENous PRN  
 naloxone (NARCAN) injection 0.4 mg  0.4 mg IntraVENous PRN  
 diphenhydrAMINE (BENADRYL) injection 12.5 mg  12.5 mg IntraVENous Q4H PRN  
 ondansetron (ZOFRAN) injection 4 mg  4 mg IntraVENous Q4H PRN  
 bisacodyl (DULCOLAX) suppository 10 mg  10 mg Rectal DAILY PRN  prochlorperazine (COMPAZINE) with saline injection 10 mg  10 mg IntraVENous Q6H PRN Review of Systems A comprehensive review of systems was negative. Objective:  
 
Patient Vitals for the past 24 hrs: 
 BP Temp Pulse Resp SpO2  
09/19/18 0750 143/61 98.6 °F (37 °C) 84 16 100 % 09/19/18 0510 125/72 99 °F (37.2 °C) 85 17 98 % 09/19/18 0015 109/47 99 °F (37.2 °C) 92 16 99 % 09/18/18 2008 118/61 99.3 °F (37.4 °C) 90 16 100 % 09/18/18 1656 114/71 99.2 °F (37.3 °C) 84 16 100 % 09/18/18 1110 121/66 98.8 °F (37.1 °C) 94 16 100 % 09/18/18 0846 122/72 98.8 °F (37.1 °C) 84 18 99 % 09/19 0701 - 09/19 1900 In: 500 [P.O.:500] Out: -  
09/17 1901 - 09/19 0700 In: 12848.5 [I.V.:56729.5] Out: - Physical Exam:  
Visit Vitals  /61 (BP 1 Location: Left arm, BP Patient Position: At rest;Sitting)  Pulse 84  Temp 98.6 °F (37 °C)  Resp 16  
 Ht 5' 11\" (1.803 m)  Wt 185 lb (83.9 kg)  SpO2 100%  BMI 25.8 kg/m2 General appearance: alert, cooperative, no distress, appears stated age Neck: supple, symmetrical, trachea midline, no adenopathy, thyroid: not enlarged, symmetric, no tenderness/mass/nodules, no carotid bruit and no JVD Lungs: clear to auscultation bilaterally Heart: regular rate and rhythm, S1, S2 normal, no murmur, click, rub or gallop Abdomen: soft, non-tender. Bowel sounds normal. No masses,  no organomegaly Extremities: extremities normal, atraumatic, no cyanosis or edema Data Review Recent Results (from the past 24 hour(s)) CBC WITH MANUAL DIFF Collection Time: 09/19/18  5:25 AM  
Result Value Ref Range WBC 8.8 3.6 - 11.0 K/uL  
 RBC 2.06 (L) 3.80 - 5.20 M/uL HGB 6.8 (L) 11.5 - 16.0 g/dL HCT 19.3 (L) 35.0 - 47.0 % MCV 93.7 80.0 - 99.0 FL  
 MCH 33.0 26.0 - 34.0 PG  
 MCHC 35.2 30.0 - 36.5 g/dL  
 RDW 14.6 (H) 11.5 - 14.5 % PLATELET 459 317 - 879 K/uL MPV 12.4 8.9 - 12.9 FL  
 NRBC 0.0 0  WBC ABSOLUTE NRBC 0.00 0.00 - 0.01 K/uL DIFFERENTIAL PENDING   
 NEUTROPHILS 33 32 - 75 % BAND NEUTROPHILS 0 0 - 6 % LYMPHOCYTES 44 12 - 49 % MONOCYTES 17 (H) 5 - 13 % EOSINOPHILS 5 0 - 7 % BASOPHILS 1 0 - 1 % METAMYELOCYTES 0 0 % MYELOCYTES 0 0 % PROMYELOCYTES 0 0 % BLASTS 0 0 % OTHER CELL 0 0 IMMATURE GRANULOCYTES 0 %  
 ABS. NEUTROPHILS 2.9 1.8 - 8.0 K/UL  
 ABS. LYMPHOCYTES 3.9 (H) 0.8 - 3.5 K/UL ABS. MONOCYTES 1.5 (H) 0.0 - 1.0 K/UL  
 ABS. EOSINOPHILS 0.4 0.0 - 0.4 K/UL  
 ABS. BASOPHILS 0.1 0.0 - 0.1 K/UL  
 ABS. IMM. GRANS. 0.0 K/UL  
 DF MANUAL    
 RBC COMMENTS TARGET CELLS 
PRESENT 
    
 RBC COMMENTS NORMOCYTIC, NORMOCHROMIC Assessment:  
 
Principal Problem: 
  Sickle cell pain crisis ([facility] [address] (9/8/2018) Active Problems: Hypertension (10/3/2014) Depression (10/5/2014) Nausea and vomiting (7/15/2018) Plan: 1. Continue analgesics with anticipated discharge tomorrow if all goes well. 2.  Will mobilize.

## 2018-09-20 VITALS
BODY MASS INDEX: 25.9 KG/M2 | TEMPERATURE: 98.7 F | WEIGHT: 185 LBS | OXYGEN SATURATION: 98 % | HEART RATE: 92 BPM | HEIGHT: 71 IN | DIASTOLIC BLOOD PRESSURE: 63 MMHG | SYSTOLIC BLOOD PRESSURE: 114 MMHG | RESPIRATION RATE: 18 BRPM

## 2018-09-20 PROCEDURE — 74011000250 HC RX REV CODE- 250: Performed by: INTERNAL MEDICINE

## 2018-09-20 PROCEDURE — 74011250636 HC RX REV CODE- 250/636: Performed by: INTERNAL MEDICINE

## 2018-09-20 PROCEDURE — 74011250637 HC RX REV CODE- 250/637: Performed by: INTERNAL MEDICINE

## 2018-09-20 RX ADMIN — HYDROMORPHONE HYDROCHLORIDE 1 MG: 2 INJECTION, SOLUTION INTRAMUSCULAR; INTRAVENOUS; SUBCUTANEOUS at 09:56

## 2018-09-20 RX ADMIN — DIPHENHYDRAMINE HYDROCHLORIDE 12.5 MG: 50 INJECTION, SOLUTION INTRAMUSCULAR; INTRAVENOUS at 03:48

## 2018-09-20 RX ADMIN — HYDROMORPHONE HYDROCHLORIDE 1 MG: 2 INJECTION, SOLUTION INTRAMUSCULAR; INTRAVENOUS; SUBCUTANEOUS at 03:48

## 2018-09-20 RX ADMIN — CITALOPRAM HYDROBROMIDE 10 MG: 20 TABLET ORAL at 09:55

## 2018-09-20 RX ADMIN — POLYETHYLENE GLYCOL 3350 17 G: 17 POWDER, FOR SOLUTION ORAL at 09:56

## 2018-09-20 RX ADMIN — FOLIC ACID 1 MG: 1 TABLET ORAL at 09:55

## 2018-09-20 RX ADMIN — PROCHLORPERAZINE EDISYLATE 10 MG: 5 INJECTION INTRAMUSCULAR; INTRAVENOUS at 09:56

## 2018-09-20 RX ADMIN — SUCRALFATE 1 G: 1 TABLET ORAL at 06:35

## 2018-09-20 RX ADMIN — PROCHLORPERAZINE EDISYLATE 10 MG: 5 INJECTION INTRAMUSCULAR; INTRAVENOUS at 03:48

## 2018-09-20 RX ADMIN — Medication 10 ML: at 06:35

## 2018-09-20 RX ADMIN — PANTOPRAZOLE SODIUM 40 MG: 40 TABLET, DELAYED RELEASE ORAL at 06:35

## 2018-09-20 RX ADMIN — DIPHENHYDRAMINE HYDROCHLORIDE 12.5 MG: 50 INJECTION, SOLUTION INTRAMUSCULAR; INTRAVENOUS at 09:55

## 2018-09-20 RX ADMIN — HYDROCODONE BITARTRATE AND ACETAMINOPHEN 1 TABLET: 10; 325 TABLET ORAL at 06:35

## 2018-09-20 RX ADMIN — ONDANSETRON 4 MG: 2 INJECTION INTRAMUSCULAR; INTRAVENOUS at 06:35

## 2018-09-20 NOTE — PROGRESS NOTES
Spiritual Care Assessment/Progress Note [address] 
 
 
NAME: Jose David Scott      MRN: [id number] AGE: 56 y.o. SEX: female Lutheran Affiliation: Non Jain  
Language: English  
 
9/20/2018     Total Time (in minutes): 10 Spiritual Assessment begun in [facility] GENERAL SURGERY through conversation with: 
  
    [x]Patient        [] Family    [] Friend(s) Reason for Consult: Initial/Spiritual assessment, patient floor Spiritual beliefs: (Please include comment if needed) [x] Identifies with a zay tradition:     
   [] Supported by a zay community:        
   [] Claims no spiritual orientation:       
   [] Seeking spiritual identity:            
   [] Adheres to an individual form of spirituality:       
   [] Not able to assess:                   
 
    
Identified resources for coping:  
   [x] Prayer                           
   [] Music                  [] Guided Imagery [x] Family/friends                 [] Pet visits [] Devotional reading                         [] Unknown 
   [] Other:                                      
 
 
Interventions offered during this visit: (See comments for more details) Patient Interventions: Affirmation of emotions/emotional suffering, Coping skills reviewed/reinforced, Iconic (affirming the presence of God/Higher Power) Plan of Care: 
 
 [x] Support spiritual and/or cultural needs  
 [] Support AMD and/or advance care planning process    
 [] Support grieving process 
 [] Coordinate Rites and/or Rituals  
 [] Coordination with community clergy [] No spiritual needs identified at this time 
 [] Detailed Plan of Care below (See Comments)  [] Make referral to Music Therapy 
[] Make referral to Pet Therapy    
[] Make referral to Addiction services 
[] Make referral to [facility] 
[] Make referral to Spiritual Care Partner 
[] No future visits requested       
[] Follow up visits as needed Comments:  
 
Patient sitting up in bed. Alert, smiling, friendly. Spoke briefly about present thoughts, feelings, and concerns. Says she is being discharged today and is looking forward to returning home. Plan is to remain healthy and stay out of the hospital as much as possible. Appeared encouraged as a result of this visit and expressed gratitude for this visit. Visited by Rev. Kj Douglas, [facility]  paging service: [phone])

## 2018-09-20 NOTE — DISCHARGE INSTRUCTIONS
General Discharge Instructions    Patient ID:  Dereje Sampson  [id number]  56 y.o.  1960    Patient Instructions        The following personal items were collected during your admission and were returned to you. Take Home Medications           What to do at Home    Recommended diet: Regular Diet    Recommended activity: Activity as tolerated    Follow-up with Ky Barajas MD  in 5 days. Information obtained by :  I understand that if any problems occur once I am at home I am to contact my physician. I understand and acknowledge receipt of the instructions indicated above.                                                                                                                                            Physician's or R.N.'s Signature                                                                  Date/Time                                                                                                                                              Patient or Representative Signature                                                          Date/Time

## 2018-09-20 NOTE — PROGRESS NOTES
D/C instructions given. GIANNA M/L taken out, gauze and bandaid applied. No scripts to be given. Patient dressing herself and gathering belongings. Awaiting ride

## 2018-09-20 NOTE — PROGRESS NOTES
Patient discharging today to home. No concerns for discharge. Discussed discharge plan. Follow up appt listed. Pt agreeable to plan. 2nd IM reviewed, signed and placed on chart with copy to pt. Care Management Interventions PCP Verified by CM: Yes (sees dr Luis A Farmer every 3 months and prn) Mode of Transport at Discharge: Other (see comment) (brother or daughter will transport to home by car at discharge) Transition of Care Consult (CM Consult): Discharge Planning Discharge Durable Medical Equipment: No (none) Physical Therapy Consult: No 
Occupational Therapy Consult: No 
Speech Therapy Consult: No 
Current Support Network: Own Home (lives in a 1 story home with 3 steps into entrance, daughter lives with her at  this time) Confirm Follow Up Transport: Self (will drive self to her follow up appt or family will assist) Plan discussed with Pt/Family/Caregiver: Yes Discharge Location Discharge Placement: Home ABBI Dalal, RN Care Manager [id number] [facility] 
[phone]

## 2018-09-21 NOTE — DISCHARGE SUMMARY
Ady Rodgers  MR#: [id number]  : 1960  ACCOUNT #: [de-identified]   ADMIT DATE: 2018  DISCHARGE DATE: 2018    HISTORY OF PRESENT ILLNESS:  Patient is a 53-year-old lady with history of SC hemoglobinopathy presented to the emergency room complaining of generalized pain accompanied by recurrent nausea and vomiting. She was evaluated and subsequently admitted for presumed painful crises. PAST MEDICAL HISTORY, SOCIAL HISTORY, REVIEW OF SYSTEMS, FAMILY HISTORY, PHYSICAL EXAMINATION:  As in admitting H and P.    LABORATORY VALUES:  Initial hemoglobin was 8.4 white count 8.7, MCV 94.4, platelet count of 400 K with the following differential:  85 segs, 10 lymphocytes, 4 monocytes, 1 eosinophil and 1 immature granulocyte. At the time of discharge, hemoglobin of 6.8. Abnormalities in the comprehensive profile on admission revealed a bilirubin of 2.5, total protein 10.4 with globulin of 6.0. LDH was 281. Serum lipase was 338. Urinalysis normal.  Urine culture negative. RADIOGRAPHS:  Chest x-ray negative. Repeat 72 hours later negative. HOSPITAL COURSE:  The patient was admitted and placed on parenteral fluid along with analgesics. She generally responds to Dilaudid 1 mg q. 3 hours along with the use of Phenergan and Benadryl. As the hospitalization progressed, the pain actually improved only to worsen again at which point I added Duragesic 75 mcg q. 3 days. With this regimen, she continued to improve to the point where she was relatively asymptomatic, although she continued to have mild pain at the time of discharge. Hospital course was otherwise uneventful other than fever spike of 101 degrees Fahrenheit. Generally when she comes in for crises, her temperature increases to 99.5 range. There was no evidence of any infection.   She was placed on antibiotics for approximately 48 hours before this was discontinued once the culture results were negative. FINAL DIAGNOSES:  1.  Painful sickle crises. 2.  Sickle cell hemoglobinopathy. 3.  History of depression. 4.  Primary hypertension. 5.  Status post cholecystectomy. DISPOSITION:  Patient will be discharge home, ambulatory on a regular diet. DISCHARGE MEDICATIONS:  Include the following:  Celexa 10 mg daily, Flexeril 10 mg t.i.d. p.r.n., fentanyl patch 75 mcg q. 72 hours, folic acid 1 mg daily, hydrocodone 10/325 one q. 6 hours p.r.n., losartan/hydrochlorothiazide 50/12.5 one q. a.m., omeprazole 20 mg daily, Zofran 4 mg q. 6 hours p.r.n. disintegrating tablets, Phenergan suppositories 25 mg q. 6 hours p.r.n. She will return to my office in 3-5 days. ADDENDUM:  I failed to mention that she has continued to have intermittent nausea and vomiting, which gradually improved. Carafate was added to her regimen and with this, things settle down. She was also maintained on a PPI. KAREL Rivas MD       LAB/MN  D: 09/20/2018 08:33     T: 09/20/2018 22:54  JOB #: [id number]

## 2018-09-24 ENCOUNTER — PATIENT OUTREACH (OUTPATIENT)
Dept: INTERNAL MEDICINE CLINIC | Age: 58
End: 2018-09-24

## 2018-09-24 ENCOUNTER — OFFICE VISIT (OUTPATIENT)
Dept: INTERNAL MEDICINE CLINIC | Age: 58
End: 2018-09-24

## 2018-09-24 VITALS
SYSTOLIC BLOOD PRESSURE: 148 MMHG | BODY MASS INDEX: 26.63 KG/M2 | TEMPERATURE: 98.9 F | RESPIRATION RATE: 16 BRPM | OXYGEN SATURATION: 96 % | DIASTOLIC BLOOD PRESSURE: 80 MMHG | HEART RATE: 93 BPM | HEIGHT: 71 IN | WEIGHT: 190.2 LBS

## 2018-09-24 DIAGNOSIS — D57.00 HB-SS DISEASE WITH CRISIS (HCC): Primary | ICD-10-CM

## 2018-09-24 DIAGNOSIS — F32.A DEPRESSION, UNSPECIFIED DEPRESSION TYPE: ICD-10-CM

## 2018-09-24 DIAGNOSIS — I10 ESSENTIAL HYPERTENSION: ICD-10-CM

## 2018-09-24 RX ORDER — HYDROCODONE BITARTRATE AND ACETAMINOPHEN 10; 325 MG/1; MG/1
1 TABLET ORAL
Qty: 120 TAB | Refills: 0 | Status: SHIPPED | OUTPATIENT
Start: 2018-09-24 | End: 2018-10-23 | Stop reason: SDUPTHER

## 2018-09-24 NOTE — PROGRESS NOTES
[facility] -2018: Sickle Cell Crisis Hospital Discharge Follow-Up Date/Time:  2018 12:14 PM 
 
Patient was admitted to [facility] on  and discharged on 2018 for Sickle Cell Crisis. The physician discharge summary was available at the time of outreach. Patient was contacted within 2 business days of discharge. Top Challenges reviewed with the provider  
none Method of communication with provider :none Inpatient RRAT score: 17 Was this a readmission? no  
Patient stated reason for the readmission: n/a Nurse Navigator (NN) contacted the patient by telephone to perform post hospital discharge assessment. Verified name and  with patient as identifiers. Provided introduction to self, and explanation of the Nurse Navigator role. Reviewed discharge instructions and red flags with patient who verbalized understanding. Patient given an opportunity to ask questions and does not have any further questions or concerns at this time. The patient agrees to contact the PCP office for questions related to their healthcare. NN provided contact information for future reference. Disease Specific:   Hematology/Sickle Cell Anemia Summary of patient's top problems: 1. Joint pain/stiffness 2. Past abdom pains w/ N/V Home Health orders at discharge: none [address]: n/a Date of initial visit: none Durable Medical Equipment ordered/company: no 
Durable Medical Equipment received: n/a Barriers to care? none Advance Care Planning:  
Does patient have an Advance Directive:  not on file; education provided Medication(s):  
New Medications at Discharge: none Changed Medications at Discharge: none Discontinued Medications at Discharge: none Medication reconciliation was performed with patient, who verbalizes understanding of administration of home medications.   There were no barriers to obtaining medications identified at this time. Referral to Pharm D needed: no  
 
Current Outpatient Prescriptions Medication Sig  
 HYDROcodone-acetaminophen (NORCO)  mg tablet Take 1 Tab by mouth every six (6) hours as needed. Max Daily Amount: 4 Tabs. Indications: sickl;e cell crisis  fentaNYL (DURAGESIC) 75 mcg/hr 1 Patch by TransDERmal route every seventy-two (72) hours. Max Daily Amount: 1 Patch.  ondansetron (ZOFRAN ODT) 4 mg disintegrating tablet Take 1 Tab by mouth every eight (8) hours as needed for Nausea.  promethazine (PHENERGAN) 25 mg suppository Insert 1 Suppository into rectum every six (6) hours as needed for Nausea.  folic acid (FOLVITE) 1 mg tablet Take 1 Tab by mouth daily.  OMEPRAZOLE PO Take 20 mg by mouth daily.  cyclobenzaprine (FLEXERIL) 10 mg tablet Take 1 Tab by mouth three (3) times daily as needed for Muscle Spasm(s).  citalopram (CELEXA) 10 mg tablet TAKE 1 TABLET BY MOUTH EVERY NIGHT AT BEDTIME  losartan-hydroCHLOROthiazide (HYZAAR) 50-12.5 mg per tablet TAKE 1 TABLET BY MOUTH DAILY  fluocinoNIDE (LIDEX) 0.05 % topical cream two (2) times daily as needed. No current facility-administered medications for this visit. There are no discontinued medications. [facility] follow up appointment(s): Future Appointments Date Time Provider Waldo Calvo 11/20/2018 10:45 AM Angi Mathews MD [address] [place] [address] Non-[facility] follow up appointment(s): no 
[facility]:  n/a  
  
Goals Addressed Most Recent  Coordinate pain management plan for patient. On track (9/24/2018) 9/24/2018:  Patient follows plan for frequent joint stiffness/pain. In addition to pain medication, exercise as much as joints allow is done. Warm soaks to joint also a recommendation when conditions that may cause S&S might be coming. EW    
  
  
 
 
 
Goals None

## 2018-09-24 NOTE — PROGRESS NOTES
Chief Complaint   Patient presents with    Transitions Of Care     Patient admitted to \[facility]"" on 9.8.18 for Sickle Cell Crisis. 1. Have you been to the ER, urgent care clinic since your last visit? Hospitalized since your last visit? Yes When: 9/8/18 Where: \[facility]\"" Reason for visit: Sickle Cell Crisis    2. Have you seen or consulted any other health care providers outside of the [address] [place] [address] since your last visit? Include any pap smears or colon screening.  No

## 2018-09-25 NOTE — PROGRESS NOTES
Chief Complaint   Patient presents with    Transitions Of Care     Patient admitted to [facility] on 9.8.18 for Sickle Cell Crisis. .      SUBECTIVE:    Yanelis Segundo is a 56 y.o. female Comes in for follow up after being hospitalized for a painful sickle crisis. The crisis was uneventful. She's done well since discharge. She has a past history of primary hypertension and depression. Current Outpatient Prescriptions   Medication Sig Dispense Refill    HYDROcodone-acetaminophen (NORCO)  mg tablet Take 1 Tab by mouth every six (6) hours as needed. Max Daily Amount: 4 Tabs. Indications: sickl;e cell crisis 120 Tab 0    fentaNYL (DURAGESIC) 75 mcg/hr 1 Patch by TransDERmal route every seventy-two (72) hours. Max Daily Amount: 1 Patch. 10 Patch 0    ondansetron (ZOFRAN ODT) 4 mg disintegrating tablet Take 1 Tab by mouth every eight (8) hours as needed for Nausea. 10 Tab 0    promethazine (PHENERGAN) 25 mg suppository Insert 1 Suppository into rectum every six (6) hours as needed for Nausea. 12 Suppository 0    folic acid (FOLVITE) 1 mg tablet Take 1 Tab by mouth daily. 30 Tab 11    OMEPRAZOLE PO Take 20 mg by mouth daily.  cyclobenzaprine (FLEXERIL) 10 mg tablet Take 1 Tab by mouth three (3) times daily as needed for Muscle Spasm(s). 50 Tab 1    citalopram (CELEXA) 10 mg tablet TAKE 1 TABLET BY MOUTH EVERY NIGHT AT BEDTIME 90 Tab 3    losartan-hydroCHLOROthiazide (HYZAAR) 50-12.5 mg per tablet TAKE 1 TABLET BY MOUTH DAILY 90 Tab 3    fluocinoNIDE (LIDEX) 0.05 % topical cream two (2) times daily as needed.        Past Medical History:   Diagnosis Date    Anemia     SC hemoglobinopathy    Chronic pain     Depression     Hypertension     Liver disease     hepatitis C; pt reports \"cured\"    Sickle cell disease ([address]      Past Surgical History:   Procedure Laterality Date    BREAST SURGERY PROCEDURE UNLISTED      2 cysts removed from R breast    CHEST SURGERY PROCEDURE UNLISTED status post thor for removal of a benign     HX BREAST BIOPSY Right 05/2007    negative    HX CHOLECYSTECTOMY      HX ORTHOPAEDIC      bony curettage of an ostial myelitis focus     Allergies   Allergen Reactions    Dilaudid [Hydromorphone (Bulk)] Itching     Can tolerate with benadryl and ondansetron    Percocet [Oxycodone-Acetaminophen] Itching       REVIEW OF SYSTEMS:  Review of Systems - Negative except   ENT ROS: negative for - headaches, hearing change, nasal congestion, oral lesions, tinnitus, visual changes or   Respiratory ROS: no cough, shortness of breath, or wheezing  Cardiovascular ROS: no chest pain or dyspnea on exertion  Gastrointestinal ROS: no abdominal pain, change in bowel habits, or black or blood  Genito-Urinary ROS: no dysuria, trouble voiding, or hematuria  Musculoskeletal ROS: negative  Neurological ROS: no TIA or stroke symptoms      Social History     Social History    Marital status:      Spouse name: N/A    Number of children: 2    Years of education: N/A     Occupational History    disabled---Sickle cell disease      Social History Main Topics    Smoking status: Never Smoker    Smokeless tobacco: Never Used    Alcohol use No    Drug use: No    Sexual activity: Yes     Partners: Male     Other Topics Concern    None     Social History Narrative   r  Family History   Problem Relation Age of Onset    Hypertension Mother     Hypertension Father        OBJECTIVE:  Visit Vitals    /80    Pulse 93    Temp 98.9 °F (37.2 °C) (Oral)    Resp 16    Ht 5' 11\" (1.803 m)    Wt 190 lb 3.2 oz (86.3 kg)    SpO2 96%    BMI 26.53 kg/m2     ENT: perrla,  eom intact  NECK: supple. Thyroid normal, no JVD  CHEST: clear to ascultation and percussion   HEART: regular rate and rhythm  ABD: soft, bowel sounds active,   EXTREMITIES: no edema, pulse 1+  INTEGUMENT: clear      ASSESSMENT:  1. Hb-SS disease with crisis ([address]    2. Essential hypertension    3.  Depression, unspecified depression type        PLAN:    1. The sickle cell disease is doing well. She will continue analgesics as prescribed. 2. BP is elevated and she needs to restart her antihypertensive medication. 3. Her depression is also stable and she'll continue her antidepressants. 4. Encouraged her to increase physical activity on a consistent basis. .  Orders Placed This Encounter    HYDROcodone-acetaminophen (Juanetta Rasp)  mg tablet       Follow-up Disposition:  Return in about 2 months (around 11/24/2018).       Morro Flores MD

## 2018-09-27 ENCOUNTER — TELEPHONE (OUTPATIENT)
Dept: INTERNAL MEDICINE CLINIC | Age: 58
End: 2018-09-27

## 2018-10-02 ENCOUNTER — OFFICE VISIT (OUTPATIENT)
Dept: INTERNAL MEDICINE CLINIC | Age: 58
End: 2018-10-02

## 2018-10-02 VITALS
HEIGHT: 71 IN | SYSTOLIC BLOOD PRESSURE: 136 MMHG | HEART RATE: 90 BPM | OXYGEN SATURATION: 97 % | TEMPERATURE: 98.7 F | DIASTOLIC BLOOD PRESSURE: 75 MMHG | RESPIRATION RATE: 16 BRPM | BODY MASS INDEX: 28.25 KG/M2 | WEIGHT: 201.8 LBS

## 2018-10-02 DIAGNOSIS — R60.9 EDEMA, UNSPECIFIED TYPE: Primary | ICD-10-CM

## 2018-10-02 DIAGNOSIS — D57.00 SICKLE CELL PAIN CRISIS (HCC): ICD-10-CM

## 2018-10-02 NOTE — MR AVS SNAPSHOT
Millie  
 
 
 [id number] 
[phone] Patient: Antonio Martinez MRN: [id number] :1960 Visit Information Date & Time Provider Department Dept. Phone Encounter #  
 10/2/2018  1:00 PM Saray Escalona MD [facility] [id number] Your Appointments 2018 10:45 AM  
Any with Saray Escalona MD  
[address] [place] [address] and Primary Care [address] [place] [address]) Appt Note: 3 Month Follow Up; 2 month follow up  
 [address] [facility]  
  
   
 [id number] Upcoming Health Maintenance Date Due Shingrix Vaccine Age 50> (1 of 2) 10/1/2012 MEDICARE YEARLY EXAM 3/14/2018 PAP AKA CERVICAL CYTOLOGY 2018 Influenza Age 5 to Adult 2018 FOBT Q 1 YEAR AGE 50-75 2020* Pneumococcal 19-64 Highest Risk (3 of 3 - PCV13) 10/31/2018 BREAST CANCER SCRN MAMMOGRAM 5/3/2020 DTaP/Tdap/Td series (2 - Td) 2026 *Topic was postponed. The date shown is not the original due date. Allergies as of 10/2/2018  Review Complete On: 10/2/2018 By: Gatito Frank Severity Noted Reaction Type Reactions Dilaudid [Hydromorphone (Bulk)]  10/03/2014    Itching Can tolerate with benadryl and ondansetron Percocet [Oxycodone-acetaminophen]  10/03/2014    Itching Current Immunizations  Reviewed on 2017 Name Date Influenza Vaccine (Quad) PF 10/31/2017 Not reviewed this visit You Were Diagnosed With   
  
 Codes Comments Edema, unspecified type    -  Primary ICD-10-CM: R60.9 ICD-9-CM: 782.3 Sickle cell pain crisis ([facility] [address]     ICD-10-CM: D57.00 ICD-9-CM: 282.62 Vitals BP Pulse Temp Resp Height(growth percentile) Weight(growth percentile) 136/75 90 98.7 °F (37.1 °C) (Oral) 16 5' 11\" (1.803 m) 201 lb 12.8 oz (91.5 kg) SpO2 BMI OB Status Smoking Status 97% 28.15 kg/m2 Postmenopausal Never Smoker Vitals History BMI and BSA Data Body Mass Index Body Surface Area  
 28.15 kg/m 2 2.14 m 2 Preferred Pharmacy Pharmacy Name Phone [address] [phone] Your Updated Medication List  
  
   
This list is accurate as of 10/2/18  3:09 PM.  Always use your most recent med list.  
  
  
  
  
 citalopram 10 mg tablet Commonly known as:  CELEXA  
TAKE 1 TABLET BY MOUTH EVERY NIGHT AT BEDTIME  
  
 cyclobenzaprine 10 mg tablet Commonly known as:  FLEXERIL Take 1 Tab by mouth three (3) times daily as needed for Muscle Spasm(s). fentaNYL 75 mcg/hr Commonly known as:  DURAGESIC  
1 Patch by TransDERmal route every seventy-two (72) hours. Max Daily Amount: 1 Patch. fluocinoNIDE 0.05 % topical cream  
Commonly known as:  LIDEX  
two (2) times daily as needed. folic acid 1 mg tablet Commonly known as:  Google Take 1 Tab by mouth daily. HYDROcodone-acetaminophen  mg tablet Commonly known as:  Osvaldo Side Take 1 Tab by mouth every six (6) hours as needed. Max Daily Amount: 4 Tabs. Indications: sickl;e cell crisis  
  
 losartan-hydroCHLOROthiazide 50-12.5 mg per tablet Commonly known as:  HYZAAR  
TAKE 1 TABLET BY MOUTH DAILY  
  
 OMEPRAZOLE PO Take 20 mg by mouth daily. ondansetron 4 mg disintegrating tablet Commonly known as:  ZOFRAN ODT Take 1 Tab by mouth every eight (8) hours as needed for Nausea. promethazine 25 mg suppository Commonly known as:  PHENERGAN Insert 1 Suppository into rectum every six (6) hours as needed for Nausea. We Performed the Following BNP [id number] CPT(R)] To-Do List   
 10/02/2018 Imaging:  XR CHEST PA LAT   
  
 10/09/2018 ECHO:  ECHO 2D ADULT Introducing [facility]! Dear [facility] [place]: 
Thank you for requesting a [email or web address] account. Our records indicate that you already have an active [email or web address] account. You can access your account anytime at [email or web address] Did you know that you can access your hospital and ER discharge instructions at any time in [email or web address]? You can also review all of your test results from your hospital stay or ER visit. Additional Information If you have questions, please visit the Frequently Asked Questions section of the [email or web address] website at [email or web address] Remember, [email or web address] is NOT to be used for urgent needs. For medical emergencies, dial 911. Now available from your iPhone and Android! Please provide this summary of care documentation to your next provider. Your primary care clinician is listed as Ezequiel Lynne. If you have any questions after today's visit, please call [phone].

## 2018-10-02 NOTE — PROGRESS NOTES
Chief Complaint   Patient presents with    Leg Swelling     bilateral x 4 days     1. Have you been to the ER, urgent care clinic since your last visit? Hospitalized since your last visit? No    2. Have you seen or consulted any other health care providers outside of the [address] [place] [address] since your last visit? Include any pap smears or colon screening.  No

## 2018-10-03 LAB — BNP SERPL-MCNC: 178.3 PG/ML (ref 0–100)

## 2018-10-07 NOTE — PROGRESS NOTES
Chief Complaint   Patient presents with    Leg Swelling     bilateral x 4 days   . SUBECTIVE:    Isabel Esquivel is a 66 y.o. female She comes in for a return visit complaining about swelling of her lower extremity. There is a mild orthostatic component. This started about a week ago. There has been no other associated symptoms. Again, there were no other associated symptoms. She denies similar symptoms previously. Her sickle cell disease is doing reasonably well. Current Outpatient Prescriptions   Medication Sig Dispense Refill    HYDROcodone-acetaminophen (NORCO)  mg tablet Take 1 Tab by mouth every six (6) hours as needed. Max Daily Amount: 4 Tabs. Indications: sickl;e cell crisis 120 Tab 0    fentaNYL (DURAGESIC) 75 mcg/hr 1 Patch by TransDERmal route every seventy-two (72) hours. Max Daily Amount: 1 Patch. 10 Patch 0    ondansetron (ZOFRAN ODT) 4 mg disintegrating tablet Take 1 Tab by mouth every eight (8) hours as needed for Nausea. 10 Tab 0    promethazine (PHENERGAN) 25 mg suppository Insert 1 Suppository into rectum every six (6) hours as needed for Nausea. 12 Suppository 0    folic acid (FOLVITE) 1 mg tablet Take 1 Tab by mouth daily. 30 Tab 11    OMEPRAZOLE PO Take 20 mg by mouth daily.  cyclobenzaprine (FLEXERIL) 10 mg tablet Take 1 Tab by mouth three (3) times daily as needed for Muscle Spasm(s). 50 Tab 1    citalopram (CELEXA) 10 mg tablet TAKE 1 TABLET BY MOUTH EVERY NIGHT AT BEDTIME 90 Tab 3    losartan-hydroCHLOROthiazide (HYZAAR) 50-12.5 mg per tablet TAKE 1 TABLET BY MOUTH DAILY 90 Tab 3    fluocinoNIDE (LIDEX) 0.05 % topical cream two (2) times daily as needed.        Past Medical History:   Diagnosis Date    Anemia     SC hemoglobinopathy    Chronic pain     Depression     Hypertension     Liver disease     hepatitis C; pt reports \"cured\"    Sickle cell disease ([address]      Past Surgical History:   Procedure Laterality Date    BREAST SURGERY PROCEDURE UNLISTED      2 cysts removed from R breast    CHEST SURGERY PROCEDURE UNLISTED      status post thor for removal of a benign     HX BREAST BIOPSY Right 05/2007    negative    HX CHOLECYSTECTOMY      HX ORTHOPAEDIC      bony curettage of an ostial myelitis focus     Allergies   Allergen Reactions    Dilaudid [Hydromorphone (Bulk)] Itching     Can tolerate with benadryl and ondansetron    Percocet [Oxycodone-Acetaminophen] Itching       REVIEW OF SYSTEMS:  Review of Systems - Negative except   ENT ROS: negative for - headaches, hearing change, nasal congestion, oral lesions, tinnitus, visual changes or   Respiratory ROS: no cough, shortness of breath, or wheezing  Cardiovascular ROS: no chest pain or dyspnea on exertion  Gastrointestinal ROS: no abdominal pain, change in bowel habits, or black or blood  Genito-Urinary ROS: no dysuria, trouble voiding, or hematuria  Musculoskeletal ROS: negative  Neurological ROS: no TIA or stroke symptoms      Social History     Social History    Marital status:      Spouse name: N/A    Number of children: 2    Years of education: N/A     Occupational History    disabled---Sickle cell disease      Social History Main Topics    Smoking status: Never Smoker    Smokeless tobacco: Never Used    Alcohol use No    Drug use: No    Sexual activity: Yes     Partners: Male     Other Topics Concern    None     Social History Narrative   r  Family History   Problem Relation Age of Onset    Hypertension Mother     Hypertension Father        OBJECTIVE:  Visit Vitals    /75    Pulse 90    Temp 98.7 °F (37.1 °C) (Oral)    Resp 16    Ht 5' 11\" (1.803 m)    Wt 201 lb 12.8 oz (91.5 kg)    SpO2 97%    BMI 28.15 kg/m2     ENT: perrla,  eom intact  NECK: supple. Thyroid normal, ?  JVD  CHEST: clear to ascultation and percussion   HEART: regular rate and rhythm  ABD: soft, bowel sounds active,   EXTREMITIES: 1+ edema distally, pulse 1+  INTEGUMENT: clear      ASSESSMENT:  1. Edema, unspecified type    2. Sickle cell pain crisis ([address]        PLAN:    1. I am not entirely sure of the origin of the patient's edema. I do not think this is related to venous insufficiency, in view of the fact that it just started approximately a week ago and she has not had this previously. There are soft findings suggesting the possibility of cardiac decompensation. I will await the results of my labs. 2. Her sickle cell disease is reasonably stable with her chronic analgesic usage. .  Orders Placed This Encounter    XR CHEST PA LAT    BNP    ECHO 2D ADULT       Follow-up Disposition:  Return in about 2 weeks (around 10/16/2018).       Nora Whalen MD

## 2018-10-23 DIAGNOSIS — D57.00 HB-SS DISEASE WITH CRISIS (HCC): ICD-10-CM

## 2018-10-23 DIAGNOSIS — D57.00 SICKLE CELL CRISIS (HCC): ICD-10-CM

## 2018-10-23 RX ORDER — FENTANYL 75 UG/H
1 PATCH TRANSDERMAL
Qty: 10 PATCH | Refills: 0 | Status: SHIPPED | OUTPATIENT
Start: 2018-10-23 | End: 2019-03-26 | Stop reason: SDUPTHER

## 2018-10-23 RX ORDER — HYDROCODONE BITARTRATE AND ACETAMINOPHEN 10; 325 MG/1; MG/1
1 TABLET ORAL
Qty: 120 TAB | Refills: 0 | Status: SHIPPED | OUTPATIENT
Start: 2018-10-23 | End: 2018-12-05 | Stop reason: SDUPTHER

## 2018-10-25 ENCOUNTER — TELEPHONE (OUTPATIENT)
Dept: INTERNAL MEDICINE CLINIC | Age: 58
End: 2018-10-25

## 2018-10-28 ENCOUNTER — HOSPITAL ENCOUNTER (INPATIENT)
Age: 58
LOS: 10 days | Discharge: HOME | DRG: 812 | End: 2018-11-07
Attending: EMERGENCY MEDICINE | Admitting: INTERNAL MEDICINE
Payer: MEDICARE

## 2018-10-28 ENCOUNTER — APPOINTMENT (OUTPATIENT)
Dept: GENERAL RADIOLOGY | Age: 58
DRG: 812 | End: 2018-10-28
Attending: EMERGENCY MEDICINE
Payer: MEDICARE

## 2018-10-28 DIAGNOSIS — E87.6 HYPOKALEMIA: ICD-10-CM

## 2018-10-28 DIAGNOSIS — D57.00 SICKLE CELL PAIN CRISIS (HCC): Primary | ICD-10-CM

## 2018-10-28 DIAGNOSIS — R11.2 NON-INTRACTABLE VOMITING WITH NAUSEA, UNSPECIFIED VOMITING TYPE: ICD-10-CM

## 2018-10-28 LAB
ALBUMIN SERPL-MCNC: 4.3 G/DL (ref 3.5–5)
ALBUMIN/GLOB SERPL: 0.7 {RATIO} (ref 1.1–2.2)
ALP SERPL-CCNC: 107 U/L (ref 45–117)
ALT SERPL-CCNC: 14 U/L (ref 12–78)
ANION GAP SERPL CALC-SCNC: 15 MMOL/L (ref 5–15)
AST SERPL-CCNC: 29 U/L (ref 15–37)
BASOPHILS # BLD: 0 K/UL (ref 0–0.1)
BASOPHILS NFR BLD: 0 % (ref 0–1)
BILIRUB SERPL-MCNC: 2.2 MG/DL (ref 0.2–1)
BUN SERPL-MCNC: 23 MG/DL (ref 6–20)
BUN/CREAT SERPL: 15 (ref 12–20)
CALCIUM SERPL-MCNC: 9.9 MG/DL (ref 8.5–10.1)
CHLORIDE SERPL-SCNC: 101 MMOL/L (ref 97–108)
CO2 SERPL-SCNC: 20 MMOL/L (ref 21–32)
COMMENT, HOLDF: NORMAL
CREAT SERPL-MCNC: 1.5 MG/DL (ref 0.55–1.02)
DIFFERENTIAL METHOD BLD: ABNORMAL
EOSINOPHIL # BLD: 0 K/UL (ref 0–0.4)
EOSINOPHIL NFR BLD: 0 % (ref 0–7)
ERYTHROCYTE [DISTWIDTH] IN BLOOD BY AUTOMATED COUNT: 14.8 % (ref 11.5–14.5)
GLOBULIN SER CALC-MCNC: 6.4 G/DL (ref 2–4)
GLUCOSE SERPL-MCNC: 173 MG/DL (ref 65–100)
HCT VFR BLD AUTO: 24.4 % (ref 35–47)
HGB BLD-MCNC: 8.6 G/DL (ref 11.5–16)
IMM GRANULOCYTES # BLD: 0 K/UL (ref 0–0.04)
IMM GRANULOCYTES NFR BLD AUTO: 0 % (ref 0–0.5)
LYMPHOCYTES # BLD: 1.2 K/UL (ref 0.8–3.5)
LYMPHOCYTES NFR BLD: 15 % (ref 12–49)
MCH RBC QN AUTO: 31.2 PG (ref 26–34)
MCHC RBC AUTO-ENTMCNC: 35.2 G/DL (ref 30–36.5)
MCV RBC AUTO: 88.4 FL (ref 80–99)
MONOCYTES # BLD: 0.6 K/UL (ref 0–1)
MONOCYTES NFR BLD: 7 % (ref 5–13)
NEUTS SEG # BLD: 6.6 K/UL (ref 1.8–8)
NEUTS SEG NFR BLD: 78 % (ref 32–75)
NRBC # BLD: 0.02 K/UL (ref 0–0.01)
NRBC BLD-RTO: 0.2 PER 100 WBC
PLATELET # BLD AUTO: 361 K/UL (ref 150–400)
PMV BLD AUTO: 11.2 FL (ref 8.9–12.9)
POTASSIUM SERPL-SCNC: 2.7 MMOL/L (ref 3.5–5.1)
PROT SERPL-MCNC: 10.7 G/DL (ref 6.4–8.2)
RBC # BLD AUTO: 2.76 M/UL (ref 3.8–5.2)
RETICS # AUTO: 0.08 M/UL (ref 0.02–0.08)
RETICS/RBC NFR AUTO: 2.8 % (ref 0.7–2.1)
SAMPLES BEING HELD,HOLD: NORMAL
SODIUM SERPL-SCNC: 136 MMOL/L (ref 136–145)
TROPONIN I SERPL-MCNC: <0.05 NG/ML
WBC # BLD AUTO: 8.5 K/UL (ref 3.6–11)

## 2018-10-28 PROCEDURE — 93005 ELECTROCARDIOGRAM TRACING: CPT

## 2018-10-28 PROCEDURE — 74011250636 HC RX REV CODE- 250/636: Performed by: EMERGENCY MEDICINE

## 2018-10-28 PROCEDURE — 74011250637 HC RX REV CODE- 250/637: Performed by: EMERGENCY MEDICINE

## 2018-10-28 PROCEDURE — 96374 THER/PROPH/DIAG INJ IV PUSH: CPT

## 2018-10-28 PROCEDURE — 86900 BLOOD TYPING SEROLOGIC ABO: CPT | Performed by: EMERGENCY MEDICINE

## 2018-10-28 PROCEDURE — 96375 TX/PRO/DX INJ NEW DRUG ADDON: CPT

## 2018-10-28 PROCEDURE — 71045 X-RAY EXAM CHEST 1 VIEW: CPT

## 2018-10-28 PROCEDURE — 96376 TX/PRO/DX INJ SAME DRUG ADON: CPT

## 2018-10-28 PROCEDURE — 85025 COMPLETE CBC W/AUTO DIFF WBC: CPT | Performed by: EMERGENCY MEDICINE

## 2018-10-28 PROCEDURE — 85045 AUTOMATED RETICULOCYTE COUNT: CPT | Performed by: EMERGENCY MEDICINE

## 2018-10-28 PROCEDURE — 74011250636 HC RX REV CODE- 250/636: Performed by: INTERNAL MEDICINE

## 2018-10-28 PROCEDURE — 80053 COMPREHEN METABOLIC PANEL: CPT | Performed by: EMERGENCY MEDICINE

## 2018-10-28 PROCEDURE — 65270000029 HC RM PRIVATE

## 2018-10-28 PROCEDURE — 99285 EMERGENCY DEPT VISIT HI MDM: CPT

## 2018-10-28 PROCEDURE — 96361 HYDRATE IV INFUSION ADD-ON: CPT

## 2018-10-28 PROCEDURE — 36415 COLL VENOUS BLD VENIPUNCTURE: CPT | Performed by: INTERNAL MEDICINE

## 2018-10-28 PROCEDURE — 84484 ASSAY OF TROPONIN QUANT: CPT | Performed by: INTERNAL MEDICINE

## 2018-10-28 PROCEDURE — 65660000000 HC RM CCU STEPDOWN

## 2018-10-28 PROCEDURE — 74011000250 HC RX REV CODE- 250: Performed by: EMERGENCY MEDICINE

## 2018-10-28 RX ORDER — ONDANSETRON 2 MG/ML
4 INJECTION INTRAMUSCULAR; INTRAVENOUS
Status: COMPLETED | OUTPATIENT
Start: 2018-10-28 | End: 2018-10-28

## 2018-10-28 RX ORDER — DIPHENHYDRAMINE HYDROCHLORIDE 50 MG/ML
25 INJECTION, SOLUTION INTRAMUSCULAR; INTRAVENOUS
Status: COMPLETED | OUTPATIENT
Start: 2018-10-28 | End: 2018-10-28

## 2018-10-28 RX ORDER — POTASSIUM CHLORIDE 7.45 MG/ML
10 INJECTION INTRAVENOUS
Status: DISCONTINUED | OUTPATIENT
Start: 2018-10-28 | End: 2018-10-28

## 2018-10-28 RX ORDER — HYDROMORPHONE HYDROCHLORIDE 1 MG/ML
1 INJECTION, SOLUTION INTRAMUSCULAR; INTRAVENOUS; SUBCUTANEOUS
Status: COMPLETED | OUTPATIENT
Start: 2018-10-28 | End: 2018-10-28

## 2018-10-28 RX ORDER — POTASSIUM CHLORIDE 750 MG/1
40 TABLET, FILM COATED, EXTENDED RELEASE ORAL
Status: COMPLETED | OUTPATIENT
Start: 2018-10-28 | End: 2018-10-28

## 2018-10-28 RX ORDER — HYDROMORPHONE HYDROCHLORIDE 1 MG/ML
1 INJECTION, SOLUTION INTRAMUSCULAR; INTRAVENOUS; SUBCUTANEOUS
Status: DISCONTINUED | OUTPATIENT
Start: 2018-10-28 | End: 2018-11-05

## 2018-10-28 RX ORDER — SODIUM CHLORIDE AND POTASSIUM CHLORIDE .9; .15 G/100ML; G/100ML
SOLUTION INTRAVENOUS CONTINUOUS
Status: DISCONTINUED | OUTPATIENT
Start: 2018-10-28 | End: 2018-10-29

## 2018-10-28 RX ORDER — POTASSIUM CHLORIDE 7.45 MG/ML
10 INJECTION INTRAVENOUS
Status: COMPLETED | OUTPATIENT
Start: 2018-10-29 | End: 2018-10-29

## 2018-10-28 RX ORDER — DIPHENHYDRAMINE HYDROCHLORIDE 50 MG/ML
25 INJECTION, SOLUTION INTRAMUSCULAR; INTRAVENOUS
Status: DISCONTINUED | OUTPATIENT
Start: 2018-10-28 | End: 2018-11-07 | Stop reason: HOSPADM

## 2018-10-28 RX ADMIN — HYDROMORPHONE HYDROCHLORIDE 1 MG: 1 INJECTION, SOLUTION INTRAMUSCULAR; INTRAVENOUS; SUBCUTANEOUS at 21:19

## 2018-10-28 RX ADMIN — SODIUM CHLORIDE 1000 ML: 900 INJECTION, SOLUTION INTRAVENOUS at 22:01

## 2018-10-28 RX ADMIN — HYDROMORPHONE HYDROCHLORIDE 1 MG: 1 INJECTION, SOLUTION INTRAMUSCULAR; INTRAVENOUS; SUBCUTANEOUS at 22:29

## 2018-10-28 RX ADMIN — POTASSIUM CHLORIDE 10 MEQ: 10 INJECTION, SOLUTION INTRAVENOUS at 23:46

## 2018-10-28 RX ADMIN — ONDANSETRON 4 MG: 2 INJECTION, SOLUTION INTRAMUSCULAR; INTRAVENOUS at 21:19

## 2018-10-28 RX ADMIN — DIPHENHYDRAMINE HYDROCHLORIDE 25 MG: 50 INJECTION, SOLUTION INTRAMUSCULAR; INTRAVENOUS at 21:19

## 2018-10-28 RX ADMIN — POTASSIUM CHLORIDE 40 MEQ: 750 TABLET, FILM COATED, EXTENDED RELEASE ORAL at 22:29

## 2018-10-28 RX ADMIN — ONDANSETRON 4 MG: 2 INJECTION, SOLUTION INTRAMUSCULAR; INTRAVENOUS at 22:29

## 2018-10-28 RX ADMIN — PROCHLORPERAZINE EDISYLATE 10 MG: 5 INJECTION INTRAMUSCULAR; INTRAVENOUS at 22:29

## 2018-10-29 LAB
ABO + RH BLD: NORMAL
ANION GAP SERPL CALC-SCNC: 9 MMOL/L (ref 5–15)
ATRIAL RATE: 86 BPM
BASOPHILS # BLD: 0 K/UL (ref 0–0.1)
BASOPHILS NFR BLD: 0 % (ref 0–1)
BLOOD GROUP ANTIBODIES SERPL: NORMAL
BUN SERPL-MCNC: 17 MG/DL (ref 6–20)
BUN/CREAT SERPL: 18 (ref 12–20)
CALCIUM SERPL-MCNC: 9 MG/DL (ref 8.5–10.1)
CALCULATED P AXIS, ECG09: 59 DEGREES
CALCULATED R AXIS, ECG10: 46 DEGREES
CALCULATED T AXIS, ECG11: 39 DEGREES
CHLORIDE SERPL-SCNC: 107 MMOL/L (ref 97–108)
CO2 SERPL-SCNC: 23 MMOL/L (ref 21–32)
CREAT SERPL-MCNC: 0.93 MG/DL (ref 0.55–1.02)
DIAGNOSIS, 93000: NORMAL
DIFFERENTIAL METHOD BLD: ABNORMAL
EOSINOPHIL # BLD: 0 K/UL (ref 0–0.4)
EOSINOPHIL NFR BLD: 0 % (ref 0–7)
ERYTHROCYTE [DISTWIDTH] IN BLOOD BY AUTOMATED COUNT: 14.9 % (ref 11.5–14.5)
GLUCOSE SERPL-MCNC: 140 MG/DL (ref 65–100)
HCT VFR BLD AUTO: 21.7 % (ref 35–47)
HGB BLD-MCNC: 7.6 G/DL (ref 11.5–16)
IMM GRANULOCYTES # BLD: 0.1 K/UL (ref 0–0.04)
IMM GRANULOCYTES NFR BLD AUTO: 1 % (ref 0–0.5)
LYMPHOCYTES # BLD: 0.9 K/UL (ref 0.8–3.5)
LYMPHOCYTES NFR BLD: 9 % (ref 12–49)
MCH RBC QN AUTO: 31.8 PG (ref 26–34)
MCHC RBC AUTO-ENTMCNC: 35 G/DL (ref 30–36.5)
MCV RBC AUTO: 90.8 FL (ref 80–99)
MONOCYTES # BLD: 0.8 K/UL (ref 0–1)
MONOCYTES NFR BLD: 8 % (ref 5–13)
NEUTS SEG # BLD: 8.7 K/UL (ref 1.8–8)
NEUTS SEG NFR BLD: 83 % (ref 32–75)
NRBC # BLD: 0.02 K/UL (ref 0–0.01)
NRBC BLD-RTO: 0.2 PER 100 WBC
P-R INTERVAL, ECG05: 142 MS
PLATELET # BLD AUTO: 283 K/UL (ref 150–400)
PMV BLD AUTO: 11.3 FL (ref 8.9–12.9)
POTASSIUM SERPL-SCNC: 3.3 MMOL/L (ref 3.5–5.1)
Q-T INTERVAL, ECG07: 482 MS
QRS DURATION, ECG06: 94 MS
QTC CALCULATION (BEZET), ECG08: 573 MS
RBC # BLD AUTO: 2.39 M/UL (ref 3.8–5.2)
SODIUM SERPL-SCNC: 139 MMOL/L (ref 136–145)
SPECIMEN EXP DATE BLD: NORMAL
TROPONIN I SERPL-MCNC: <0.05 NG/ML
TROPONIN I SERPL-MCNC: <0.05 NG/ML
VENTRICULAR RATE, ECG03: 85 BPM
WBC # BLD AUTO: 10.5 K/UL (ref 3.6–11)

## 2018-10-29 PROCEDURE — 80048 BASIC METABOLIC PNL TOTAL CA: CPT | Performed by: INTERNAL MEDICINE

## 2018-10-29 PROCEDURE — 65270000015 HC RM PRIVATE ONCOLOGY

## 2018-10-29 PROCEDURE — 74011250636 HC RX REV CODE- 250/636: Performed by: INTERNAL MEDICINE

## 2018-10-29 PROCEDURE — 85025 COMPLETE CBC W/AUTO DIFF WBC: CPT | Performed by: INTERNAL MEDICINE

## 2018-10-29 PROCEDURE — 36415 COLL VENOUS BLD VENIPUNCTURE: CPT | Performed by: INTERNAL MEDICINE

## 2018-10-29 PROCEDURE — 74011000250 HC RX REV CODE- 250: Performed by: INTERNAL MEDICINE

## 2018-10-29 PROCEDURE — 84484 ASSAY OF TROPONIN QUANT: CPT | Performed by: INTERNAL MEDICINE

## 2018-10-29 PROCEDURE — 74011250637 HC RX REV CODE- 250/637: Performed by: INTERNAL MEDICINE

## 2018-10-29 RX ORDER — SODIUM CHLORIDE 0.9 % (FLUSH) 0.9 %
5-10 SYRINGE (ML) INJECTION EVERY 8 HOURS
Status: DISCONTINUED | OUTPATIENT
Start: 2018-10-29 | End: 2018-11-01

## 2018-10-29 RX ORDER — CITALOPRAM 20 MG/1
10 TABLET, FILM COATED ORAL DAILY
Status: DISCONTINUED | OUTPATIENT
Start: 2018-10-29 | End: 2018-11-07 | Stop reason: HOSPADM

## 2018-10-29 RX ORDER — SODIUM CHLORIDE 0.9 % (FLUSH) 0.9 %
10 SYRINGE (ML) INJECTION AS NEEDED
Status: DISCONTINUED | OUTPATIENT
Start: 2018-10-29 | End: 2018-11-01

## 2018-10-29 RX ORDER — ONDANSETRON 2 MG/ML
4 INJECTION INTRAMUSCULAR; INTRAVENOUS
Status: DISCONTINUED | OUTPATIENT
Start: 2018-10-29 | End: 2018-11-07 | Stop reason: HOSPADM

## 2018-10-29 RX ORDER — FOLIC ACID 1 MG/1
1 TABLET ORAL DAILY
Status: DISCONTINUED | OUTPATIENT
Start: 2018-10-29 | End: 2018-11-07 | Stop reason: HOSPADM

## 2018-10-29 RX ORDER — SODIUM CHLORIDE 0.9 % (FLUSH) 0.9 %
5-10 SYRINGE (ML) INJECTION AS NEEDED
Status: DISCONTINUED | OUTPATIENT
Start: 2018-10-29 | End: 2018-11-07 | Stop reason: HOSPADM

## 2018-10-29 RX ORDER — DEXTROSE, SODIUM CHLORIDE, AND POTASSIUM CHLORIDE 5; .45; .15 G/100ML; G/100ML; G/100ML
100 INJECTION INTRAVENOUS CONTINUOUS
Status: DISCONTINUED | OUTPATIENT
Start: 2018-10-29 | End: 2018-10-30

## 2018-10-29 RX ORDER — PANTOPRAZOLE SODIUM 40 MG/1
40 TABLET, DELAYED RELEASE ORAL
Status: DISCONTINUED | OUTPATIENT
Start: 2018-10-29 | End: 2018-10-30

## 2018-10-29 RX ORDER — ENOXAPARIN SODIUM 100 MG/ML
40 INJECTION SUBCUTANEOUS EVERY 24 HOURS
Status: DISCONTINUED | OUTPATIENT
Start: 2018-10-29 | End: 2018-11-07 | Stop reason: HOSPADM

## 2018-10-29 RX ORDER — ONDANSETRON 2 MG/ML
4 INJECTION INTRAMUSCULAR; INTRAVENOUS
Status: DISCONTINUED | OUTPATIENT
Start: 2018-10-29 | End: 2018-10-29 | Stop reason: SDUPTHER

## 2018-10-29 RX ORDER — BISACODYL 5 MG
5 TABLET, DELAYED RELEASE (ENTERIC COATED) ORAL DAILY PRN
Status: DISCONTINUED | OUTPATIENT
Start: 2018-10-29 | End: 2018-11-07 | Stop reason: HOSPADM

## 2018-10-29 RX ORDER — CYCLOBENZAPRINE HCL 10 MG
10 TABLET ORAL
Status: DISCONTINUED | OUTPATIENT
Start: 2018-10-29 | End: 2018-11-07 | Stop reason: HOSPADM

## 2018-10-29 RX ADMIN — Medication 10 ML: at 08:50

## 2018-10-29 RX ADMIN — HYDROMORPHONE HYDROCHLORIDE 1 MG: 1 INJECTION, SOLUTION INTRAMUSCULAR; INTRAVENOUS; SUBCUTANEOUS at 02:06

## 2018-10-29 RX ADMIN — HYDROMORPHONE HYDROCHLORIDE 1 MG: 1 INJECTION, SOLUTION INTRAMUSCULAR; INTRAVENOUS; SUBCUTANEOUS at 10:06

## 2018-10-29 RX ADMIN — Medication 10 ML: at 03:41

## 2018-10-29 RX ADMIN — SODIUM CHLORIDE 5 MG: 9 INJECTION INTRAMUSCULAR; INTRAVENOUS; SUBCUTANEOUS at 06:07

## 2018-10-29 RX ADMIN — FOLIC ACID 1 MG: 1 TABLET ORAL at 08:46

## 2018-10-29 RX ADMIN — ENOXAPARIN SODIUM 40 MG: 100 INJECTION SUBCUTANEOUS at 08:46

## 2018-10-29 RX ADMIN — PANTOPRAZOLE SODIUM 40 MG: 40 TABLET, DELAYED RELEASE ORAL at 07:15

## 2018-10-29 RX ADMIN — SODIUM CHLORIDE AND POTASSIUM CHLORIDE: 9; 1.49 INJECTION, SOLUTION INTRAVENOUS at 12:56

## 2018-10-29 RX ADMIN — HYDROMORPHONE HYDROCHLORIDE 1 MG: 1 INJECTION, SOLUTION INTRAMUSCULAR; INTRAVENOUS; SUBCUTANEOUS at 22:25

## 2018-10-29 RX ADMIN — HYDROMORPHONE HYDROCHLORIDE 1 MG: 1 INJECTION, SOLUTION INTRAMUSCULAR; INTRAVENOUS; SUBCUTANEOUS at 18:08

## 2018-10-29 RX ADMIN — DEXTROSE MONOHYDRATE, SODIUM CHLORIDE, AND POTASSIUM CHLORIDE 100 ML/HR: 50; 4.5; 1.49 INJECTION, SOLUTION INTRAVENOUS at 22:27

## 2018-10-29 RX ADMIN — SODIUM CHLORIDE 5 MG: 9 INJECTION INTRAMUSCULAR; INTRAVENOUS; SUBCUTANEOUS at 14:02

## 2018-10-29 RX ADMIN — CITALOPRAM HYDROBROMIDE 10 MG: 20 TABLET ORAL at 08:46

## 2018-10-29 RX ADMIN — ONDANSETRON 4 MG: 2 INJECTION INTRAMUSCULAR; INTRAVENOUS at 03:41

## 2018-10-29 RX ADMIN — POTASSIUM CHLORIDE 10 MEQ: 10 INJECTION, SOLUTION INTRAVENOUS at 01:21

## 2018-10-29 RX ADMIN — ONDANSETRON 4 MG: 2 INJECTION INTRAMUSCULAR; INTRAVENOUS at 09:25

## 2018-10-29 RX ADMIN — Medication 10 ML: at 21:47

## 2018-10-29 RX ADMIN — HYDROMORPHONE HYDROCHLORIDE 1 MG: 1 INJECTION, SOLUTION INTRAMUSCULAR; INTRAVENOUS; SUBCUTANEOUS at 06:11

## 2018-10-29 RX ADMIN — SODIUM CHLORIDE 5 MG: 9 INJECTION INTRAMUSCULAR; INTRAVENOUS; SUBCUTANEOUS at 18:08

## 2018-10-29 RX ADMIN — SODIUM CHLORIDE 5 MG: 9 INJECTION INTRAMUSCULAR; INTRAVENOUS; SUBCUTANEOUS at 22:22

## 2018-10-29 RX ADMIN — SODIUM CHLORIDE 5 MG: 9 INJECTION INTRAMUSCULAR; INTRAVENOUS; SUBCUTANEOUS at 10:06

## 2018-10-29 RX ADMIN — SODIUM CHLORIDE AND POTASSIUM CHLORIDE: 9; 1.49 INJECTION, SOLUTION INTRAVENOUS at 01:31

## 2018-10-29 RX ADMIN — LOSARTAN POTASSIUM: 25 TABLET, FILM COATED ORAL at 12:38

## 2018-10-29 RX ADMIN — HYDROMORPHONE HYDROCHLORIDE 1 MG: 1 INJECTION, SOLUTION INTRAMUSCULAR; INTRAVENOUS; SUBCUTANEOUS at 14:02

## 2018-10-29 NOTE — CDMP QUERY
Please clarify if this patient is (was) being treated/managed for:  
 
=> Hypokalemia (POA) as evidenced of K @ 2.7 upon arrival to ER, in the setting of pt with nausea, vomiting and Sickle cell crisis, requiring, IVF NS bolus x1 @ 1000cc in ER, and IV KCL 10meq in 100cc IVPB then cont. on admission with  IVF NS KCL 20meq @ 100cc/hr, close monitoring of Chemistry =>Hypokalemia (POA) in the setting of dehydration, as evidenced by K  
as evidenced of K @ 2.7 upon arrival to ER, in the setting of pt with nausea, vomiting and Sickle cell crisis, requiring, IVF NS bolus x1 @ 1000cc in ER, and IV KCL 10meq in 100cc IVPB then cont. on admission with  IVF NS KCL 20meq @ 100cc/hr, close monitoring of Chemistry  
 
 
=> Other explanation of clinical findings 
=> Clinically Undetermined (no explanation for clinical findings) The medical record reflects the following clinical findings, treatment, and risk factors. Risk Factors:  Nausea, vomiting, sickle cell crisis with chest pain Clinical Indicators:  K @ 2.7, nausea, vomiting Treatment: IVF NS bolus x1 @ 1000cc in ER, and IV KCL 10meq in 100cc IVPB then cont. on admission with  IVF NS KCL 20meq @ 100cc/hr, close monitoring of Chemistry Please clarify and document your clinical opinion in the progress notes and discharge summary including the definitive and/or presumptive diagnosis, (suspected or probable), related to the above clinical findings. Please include clinical findings supporting your diagnosis. Thank you, KISHA HigginsN, RN, [facility] 
[phone]

## 2018-10-29 NOTE — PROGRESS NOTES
Oncology Interdisciplinary rounds were held today to discuss patient plan of care and outcomes. The following members were present: Nursing, Case Management, Pharmacy and Dietary. Actual Length of Stay: 1 Expected Length of Stay: - - - Plan               Mobility         Discharge Plan IV pain control Monitor troponins Monitor nausea/vomiting I/O Up ad erum Home 11/1

## 2018-10-29 NOTE — ED NOTES
Bedside and Verbal shift change report given to Duane Chavez (oncoming nurse) by Silas Reza (offgoing nurse). Report included the following information SBAR, ED Summary, MAR and Recent Results.

## 2018-10-29 NOTE — ED NOTES
TRANSFER - OUT REPORT: 
 
Verbal report given to Rebecca (name) on Edgar Holguin  being transferred to Oncology (unit) for routine progression of care Report consisted of patients Situation, Background, Assessment and  
Recommendations(SBAR). Information from the following report(s) SBAR, ED Summary, [place] and Recent Results was reviewed with the receiving nurse. Lines:  
Peripheral IV 10/28/18 Left Antecubital (Active) Site Assessment Clean, dry, & intact 10/28/2018  9:06 PM  
Phlebitis Assessment 0 10/28/2018  9:06 PM  
Infiltration Assessment 0 10/28/2018  9:06 PM  
Dressing Status Clean, dry, & intact 10/28/2018  9:06 PM  
Dressing Type Transparent 10/28/2018  9:06 PM  
Hub Color/Line Status Pink 10/28/2018  9:06 PM  
Action Taken Blood drawn 10/28/2018  9:06 PM  
  
 
Opportunity for questions and clarification was provided.

## 2018-10-29 NOTE — H&P
Hospitalist Admission NoteNAME: Talat Mak :  1960 MRN:  [id number] Date/Time:  10/28/2018 11:39 PM 
 
Patient PCP: Ariane Morgan MD 
______________________________________________________________________ Given the patient's current clinical presentation, I have a high level of concern for decompensation if discharged from the emergency department. Complex decision making was performed, which includes reviewing the patient's available past medical records, laboratory results, and x-ray films. My assessment of this patient's clinical condition and my plan of care is as follows. Assessment / Plan: 
 
Sickle cell pain crisis  
-started IV pain medication -IV fluid  
-monitor HG , retic count   
-check serial troponin  
  
  
  Hypertension  
-continue home medication  
  
  Depression  
-continue Celexa 10 mg daily  
  
  Nausea and vomiting  
-continue Zofran  
  
 
 
Code Status: full Surrogate Decision Maker: DVT Prophylaxis: Lovenox GI Prophylaxis: not indicated Baseline: independent Subjective: CHIEF COMPLAINT: back pain HISTORY OF PRESENT ILLNESS:   Hellen Ivan is a 56 y.o.  female  PMHx significant for HTN, anemia, sickle cell disease, osteomalacia  presented to ED complaining from chest pain associated with back pain and nausea and vomiting started today denies any SOB any fever any chills , chest xray was done and show no acute abnormality patient was admitted last month for sickle cell pain crisis and was discharged  . We were asked to admit for work up and evaluation of the above problems. Past Medical History:  
Diagnosis Date  Anemia SC hemoglobinopathy  Chronic pain  Depression  Hypertension  Liver disease   
 hepatitis C; pt reports \"cured\"  Sickle cell disease ([facility] [address] Past Surgical History:  
Procedure Laterality Date  BREAST SURGERY PROCEDURE UNLISTED    
 2 cysts removed from R breast  
 CHEST SURGERY PROCEDURE UNLISTED    
 status post thor for removal of a benign  HX BREAST BIOPSY Right 05/2007  
 negative  HX CHOLECYSTECTOMY  HX ORTHOPAEDIC    
 bony curettage of an ostial myelitis focus Social History Tobacco Use  Smoking status: Never Smoker  Smokeless tobacco: Never Used Substance Use Topics  Alcohol use: No  
  
 
Family History Problem Relation Age of Onset  Hypertension Mother  Hypertension Father Allergies Allergen Reactions  Dilaudid [Hydromorphone (Bulk)] Itching Can tolerate with benadryl and ondansetron  Percocet [Oxycodone-Acetaminophen] Itching Prior to Admission medications Medication Sig Start Date End Date Taking? Authorizing Provider HYDROcodone-acetaminophen (NORCO)  mg tablet Take 1 Tab by mouth every six (6) hours as needed. Max Daily Amount: 4 Tabs. 10/23/18   Carmen Collins MD  
fentaNYL (DURAGESIC) 75 mcg/hr 1 Patch by TransDERmal route every seventy-two (72) hours. Max Daily Amount: 1 Patch. 10/23/18   Carmen Collins MD  
ondansetron (ZOFRAN ODT) 4 mg disintegrating tablet Take 1 Tab by mouth every eight (8) hours as needed for Nausea. 7/15/18   Maryanne Nyhan, MD  
promethazine (PHENERGAN) 25 mg suppository Insert 1 Suppository into rectum every six (6) hours as needed for Nausea. 7/15/18   Maryanne Nyhan, MD  
folic acid (FOLVITE) 1 mg tablet Take 1 Tab by mouth daily. 6/12/18   Rajeev Long MD  
OMEPRAZOLE PO Take 20 mg by mouth daily. Ean Porter MD  
cyclobenzaprine (FLEXERIL) 10 mg tablet Take 1 Tab by mouth three (3) times daily as needed for Muscle Spasm(s).  11/28/17   Rajeev Long MD  
citalopram (CELEXA) 10 mg tablet TAKE 1 TABLET BY MOUTH EVERY NIGHT AT BEDTIME 11/28/17   Rajeev Long MD  
losartan-hydroCHLOROthiazide [facility]) 50-12.5 mg per tablet TAKE 1 TABLET BY MOUTH DAILY 11/16/17   Rajeev Long MD  
 fluocinoNIDE (LIDEX) 0.05 % topical cream two (2) times daily as needed. 8/1/14   Provider, Historical  
 
 
REVIEW OF SYSTEMS:    
I am not able to complete the review of systems because: The patient is intubated and sedated The patient has altered mental status due to his acute medical problems The patient has baseline aphasia from prior stroke(s) The patient has baseline dementia and is not reliable historian The patient is in acute medical distress and unable to provide information Total of 12 systems reviewed as follows:   
   POSITIVE= underlined text  Negative = text not underlined General:  fever, chills, sweats, generalized weakness, weight loss/gain,  
   loss of appetite Eyes:    blurred vision, eye pain, loss of vision, double vision ENT:    rhinorrhea, pharyngitis Respiratory:   cough, sputum production, SOB, MARS, wheezing, pleuritic pain  
Cardiology:   chest pain, palpitations, orthopnea, PND, edema, syncope Gastrointestinal:  abdominal pain , N/V, diarrhea, dysphagia, constipation, bleeding Genitourinary:  frequency, urgency, dysuria, hematuria, incontinence Muskuloskeletal :  arthralgia, myalgia, back pain Hematology:  easy bruising, nose or gum bleeding, lymphadenopathy Dermatological: rash, ulceration, pruritis, color change / jaundice Endocrine:   hot flashes or polydipsia Neurological:  headache, dizziness, confusion, focal weakness, paresthesia, Speech difficulties, memory loss, gait difficulty Psychological: Feelings of anxiety, depression, agitation Objective: VITALS:   
Visit Vitals /75 Pulse 81 Resp 13 SpO2 100% PHYSICAL EXAM: 
 
 
_______________________________________________________________________ Care Plan discussed with: 
  Comments Patient yy Family RN Care Manager Consultant:     
_______________________________________________________________________ Expected  Disposition:  
Home with Family y HH/PT/OT/RN   
SNF/LTC   
JEREMY   
________________________________________________________________________ TOTAL TIME:  60 Minutes Critical Care Provided     Minutes non procedure based Comments  
 y Reviewed previous records  
>50% of visit spent in counseling and coordination of care y Discussion with patient and/or family and questions answered 
  
 
________________________________________________________________________ Signed: Billy Peres MD 
 
Procedures: see electronic medical records for all procedures/Xrays and details which were not copied into this note but were reviewed prior to creation of Plan. LAB DATA REVIEWED:   
Recent Results (from the past 24 hour(s)) CBC WITH AUTOMATED DIFF Collection Time: 10/28/18  9:06 PM  
Result Value Ref Range WBC 8.5 3.6 - 11.0 K/uL  
 RBC 2.76 (L) 3.80 - 5.20 M/uL HGB 8.6 (L) 11.5 - 16.0 g/dL HCT 24.4 (L) 35.0 - 47.0 % MCV 88.4 80.0 - 99.0 FL  
 MCH 31.2 26.0 - 34.0 PG  
 MCHC 35.2 30.0 - 36.5 g/dL RDW 14.8 (H) 11.5 - 14.5 % PLATELET 807 112 - 456 K/uL MPV 11.2 8.9 - 12.9 FL  
 NRBC 0.2 (H) 0  WBC ABSOLUTE NRBC 0.02 (H) 0.00 - 0.01 K/uL NEUTROPHILS 78 (H) 32 - 75 % LYMPHOCYTES 15 12 - 49 % MONOCYTES 7 5 - 13 % EOSINOPHILS 0 0 - 7 % BASOPHILS 0 0 - 1 % IMMATURE GRANULOCYTES 0 0.0 - 0.5 % ABS. NEUTROPHILS 6.6 1.8 - 8.0 K/UL  
 ABS. LYMPHOCYTES 1.2 0.8 - 3.5 K/UL  
 ABS. MONOCYTES 0.6 0.0 - 1.0 K/UL  
 ABS. EOSINOPHILS 0.0 0.0 - 0.4 K/UL  
 ABS. BASOPHILS 0.0 0.0 - 0.1 K/UL  
 ABS. IMM. GRANS. 0.0 0.00 - 0.04 K/UL  
 DF AUTOMATED METABOLIC PANEL, COMPREHENSIVE Collection Time: 10/28/18  9:06 PM  
Result Value Ref Range Sodium 136 136 - 145 mmol/L Potassium 2.7 (LL) 3.5 - 5.1 mmol/L Chloride 101 97 - 108 mmol/L  
 CO2 20 (L) 21 - 32 mmol/L Anion gap 15 5 - 15 mmol/L Glucose 173 (H) 65 - 100 mg/dL BUN 23 (H) 6 - 20 MG/DL Creatinine 1.50 (H) 0.55 - 1.02 MG/DL  
 BUN/Creatinine ratio 15 12 - 20 GFR est AA 44 (L) >60 ml/min/1.73m2 GFR est non-AA 36 (L) >60 ml/min/1.73m2 Calcium 9.9 8.5 - 10.1 MG/DL Bilirubin, total 2.2 (H) 0.2 - 1.0 MG/DL  
 ALT (SGPT) 14 12 - 78 U/L  
 AST (SGOT) 29 15 - 37 U/L Alk. phosphatase 107 45 - 117 U/L Protein, total 10.7 (H) 6.4 - 8.2 g/dL Albumin 4.3 3.5 - 5.0 g/dL Globulin 6.4 (H) 2.0 - 4.0 g/dL A-G Ratio 0.7 (L) 1.1 - 2.2 RETICULOCYTE COUNT Collection Time: 10/28/18  9:06 PM  
Result Value Ref Range Reticulocyte count 2.8 (H) 0.7 - 2.1 % Absolute Retic Cnt. 0.0767 0.0164 - 0.0776 M/ul TYPE & SCREEN Collection Time: 10/28/18  9:06 PM  
Result Value Ref Range Crossmatch Expiration 10/31/2018 ABO/Rh(D) A NEGATIVE Antibody screen NEG   
SAMPLES BEING HELD Collection Time: 10/28/18  9:06 PM  
Result Value Ref Range SAMPLES BEING HELD 1BLUE   
 COMMENT   Add-on orders for these samples will be processed based on acceptable specimen integrity and analyte stability, which may vary by analyte. TROPONIN I Collection Time: 10/28/18  9:06 PM  
Result Value Ref Range Troponin-I, Qt. <0.05 <0.05 ng/mL EKG, 12 LEAD, INITIAL Collection Time: 10/28/18 10:34 PM  
Result Value Ref Range Ventricular Rate 85 BPM  
 Atrial Rate 86 BPM  
 P-R Interval 142 ms QRS Duration 94 ms Q-T Interval 482 ms QTC Calculation (Bezet) 573 ms Calculated P Axis 59 degrees Calculated R Axis 46 degrees Calculated T Axis 39 degrees Diagnosis Normal sinus rhythm Nonspecific ST abnormality Prolonged QT When compared with ECG of 22-MAY-2017 16:38, 
QT has lengthened

## 2018-10-29 NOTE — ED PROVIDER NOTES
EMERGENCY DEPARTMENT HISTORY AND PHYSICAL EXAM 
 
 
Date: 10/28/2018 Patient Name: Lonny Arshad History of Presenting Illness Chief Complaint Patient presents with  Sickle Cell Crisis  
  back pain, chest pain History Provided By: Patient and Patient's Daughter HPI: Lonny Arshad, 66 y.o. female with PMHx significant for HTN, anemia, sickle cell disease, presents via EMS to the ED with cc of gradual onset chest pain, back pain, nausea and vomiting, onset at ~1830 after eating pink hamburger meat at a party. Daughter reports that pt felt nauseas after eating hamburger meat and started to vomit shortly after. Pt was admitted to hospital in 09/2018 for sickle cell crisis, and pt reports that current sxs are similar to past sickle cell crisis episode. Pt reports last transfusion was several years ago. Pt reports that baseline Troponin level is around 6.8. Pt denies SOB or fever. There are no other complaints, changes, or physical findings at this time. PCP: Trina Lira MD 
 
Current Facility-Administered Medications Medication Dose Route Frequency Provider Last Rate Last Dose  sodium chloride 0.9 % bolus infusion 1,000 mL  1,000 mL IntraVENous NOW Leny mejia MD 1,000 mL/hr at 10/28/18 2201 1,000 mL at 10/28/18 2201 Current Outpatient Medications Medication Sig Dispense Refill  
 HYDROcodone-acetaminophen (NORCO)  mg tablet Take 1 Tab by mouth every six (6) hours as needed. Max Daily Amount: 4 Tabs. 120 Tab 0  
 fentaNYL (DURAGESIC) 75 mcg/hr 1 Patch by TransDERmal route every seventy-two (72) hours. Max Daily Amount: 1 Patch. 10 Patch 0  
 ondansetron (ZOFRAN ODT) 4 mg disintegrating tablet Take 1 Tab by mouth every eight (8) hours as needed for Nausea. 10 Tab 0  promethazine (PHENERGAN) 25 mg suppository Insert 1 Suppository into rectum every six (6) hours as needed for Nausea.  12 Suppository 0  
  folic acid (FOLVITE) 1 mg tablet Take 1 Tab by mouth daily. 30 Tab 11  
 OMEPRAZOLE PO Take 20 mg by mouth daily.  cyclobenzaprine (FLEXERIL) 10 mg tablet Take 1 Tab by mouth three (3) times daily as needed for Muscle Spasm(s). 50 Tab 1  citalopram (CELEXA) 10 mg tablet TAKE 1 TABLET BY MOUTH EVERY NIGHT AT BEDTIME 90 Tab 3  
 losartan-hydroCHLOROthiazide (HYZAAR) 50-12.5 mg per tablet TAKE 1 TABLET BY MOUTH DAILY 90 Tab 3  
 fluocinoNIDE (LIDEX) 0.05 % topical cream two (2) times daily as needed. Past History Past Medical History: 
Past Medical History:  
Diagnosis Date  Anemia SC hemoglobinopathy  Chronic pain  Depression  Hypertension  Liver disease   
 hepatitis C; pt reports \"cured\"  Sickle cell disease ([facility] [address] Past Surgical History: 
Past Surgical History:  
Procedure Laterality Date  BREAST SURGERY PROCEDURE UNLISTED 2 cysts removed from R breast  
 CHEST SURGERY PROCEDURE UNLISTED    
 status post thor for removal of a benign  HX BREAST BIOPSY Right 05/2007  
 negative  HX CHOLECYSTECTOMY  HX ORTHOPAEDIC    
 bony curettage of an ostial myelitis focus Family History: 
Family History Problem Relation Age of Onset  Hypertension Mother  Hypertension Father Social History: 
Social History Tobacco Use  Smoking status: Never Smoker  Smokeless tobacco: Never Used Substance Use Topics  Alcohol use: No  
 Drug use: No  
 
 
Allergies: Allergies Allergen Reactions  Dilaudid [Hydromorphone (Bulk)] Itching Can tolerate with benadryl and ondansetron  Percocet [Oxycodone-Acetaminophen] Itching Review of Systems Review of Systems Constitutional: Negative for chills and fever. HENT: Negative for congestion, rhinorrhea, sneezing and sore throat. Respiratory: Negative for shortness of breath. Cardiovascular: Positive for chest pain. Gastrointestinal: Positive for nausea and vomiting. Negative for abdominal pain. Musculoskeletal: Positive for back pain. Negative for myalgias and neck stiffness. Skin: Negative for rash. Neurological: Negative for dizziness, weakness and headaches. All other systems reviewed and are negative. Physical Exam  
Physical Exam  
Constitutional: She is oriented to person, place, and time. She appears well-developed and well-nourished. Mild distress HENT:  
Head: Normocephalic and atraumatic. Mouth/Throat: Oropharynx is clear and moist.  
Eyes: Conjunctivae and EOM are normal.  
Neck: Normal range of motion and full passive range of motion without pain. Neck supple. Cardiovascular: Normal rate, regular rhythm, S1 normal, S2 normal, normal heart sounds, intact distal pulses and normal pulses. No murmur heard. Pulmonary/Chest: Effort normal and breath sounds normal. No respiratory distress. She has no wheezes. Abdominal: Soft. Normal appearance and bowel sounds are normal. She exhibits no distension. There is no tenderness. There is no rebound. Musculoskeletal: Normal range of motion. Neurological: She is alert and oriented to person, place, and time. She has normal strength. Skin: Skin is warm, dry and intact. No rash noted. Psychiatric: She has a normal mood and affect. Her speech is normal and behavior is normal. Judgment and thought content normal.  
Nursing note and vitals reviewed. Diagnostic Study Results Labs - Recent Results (from the past 12 hour(s)) CBC WITH AUTOMATED DIFF Collection Time: 10/28/18  9:06 PM  
Result Value Ref Range WBC 8.5 3.6 - 11.0 K/uL  
 RBC 2.76 (L) 3.80 - 5.20 M/uL HGB 8.6 (L) 11.5 - 16.0 g/dL HCT 24.4 (L) 35.0 - 47.0 % MCV 88.4 80.0 - 99.0 FL  
 MCH 31.2 26.0 - 34.0 PG  
 MCHC 35.2 30.0 - 36.5 g/dL  
 RDW 14.8 (H) 11.5 - 14.5 % PLATELET 015 863 - 988 K/uL MPV 11.2 8.9 - 12.9 FL  
 NRBC 0.2 (H) 0  WBC ABSOLUTE NRBC 0.02 (H) 0.00 - 0.01 K/uL NEUTROPHILS 78 (H) 32 - 75 % LYMPHOCYTES 15 12 - 49 % MONOCYTES 7 5 - 13 % EOSINOPHILS 0 0 - 7 % BASOPHILS 0 0 - 1 % IMMATURE GRANULOCYTES 0 0.0 - 0.5 % ABS. NEUTROPHILS 6.6 1.8 - 8.0 K/UL  
 ABS. LYMPHOCYTES 1.2 0.8 - 3.5 K/UL  
 ABS. MONOCYTES 0.6 0.0 - 1.0 K/UL  
 ABS. EOSINOPHILS 0.0 0.0 - 0.4 K/UL  
 ABS. BASOPHILS 0.0 0.0 - 0.1 K/UL  
 ABS. IMM. GRANS. 0.0 0.00 - 0.04 K/UL  
 DF AUTOMATED METABOLIC PANEL, COMPREHENSIVE Collection Time: 10/28/18  9:06 PM  
Result Value Ref Range Sodium 136 136 - 145 mmol/L Potassium 2.7 (LL) 3.5 - 5.1 mmol/L Chloride 101 97 - 108 mmol/L  
 CO2 20 (L) 21 - 32 mmol/L Anion gap 15 5 - 15 mmol/L Glucose 173 (H) 65 - 100 mg/dL BUN 23 (H) 6 - 20 MG/DL Creatinine 1.50 (H) 0.55 - 1.02 MG/DL  
 BUN/Creatinine ratio 15 12 - 20 GFR est AA 44 (L) >60 ml/min/1.73m2 GFR est non-AA 36 (L) >60 ml/min/1.73m2 Calcium 9.9 8.5 - 10.1 MG/DL Bilirubin, total 2.2 (H) 0.2 - 1.0 MG/DL  
 ALT (SGPT) 14 12 - 78 U/L  
 AST (SGOT) 29 15 - 37 U/L Alk. phosphatase 107 45 - 117 U/L Protein, total 10.7 (H) 6.4 - 8.2 g/dL Albumin 4.3 3.5 - 5.0 g/dL Globulin 6.4 (H) 2.0 - 4.0 g/dL A-G Ratio 0.7 (L) 1.1 - 2.2 RETICULOCYTE COUNT Collection Time: 10/28/18  9:06 PM  
Result Value Ref Range Reticulocyte count 2.8 (H) 0.7 - 2.1 % Absolute Retic Cnt. 0.0767 0.0164 - 0.0776 M/ul TYPE & SCREEN Collection Time: 10/28/18  9:06 PM  
Result Value Ref Range Crossmatch Expiration 10/31/2018 ABO/Rh(D) A NEGATIVE Antibody screen NEG   
SAMPLES BEING HELD Collection Time: 10/28/18  9:06 PM  
Result Value Ref Range SAMPLES BEING HELD 1BLUE   
 COMMENT Add-on orders for these samples will be processed based on acceptable specimen integrity and analyte stability, which may vary by analyte. TROPONIN I Collection Time: 10/28/18  9:06 PM  
Result Value Ref Range Troponin-I, Qt. <0.05 <0.05 ng/mL EKG, 12 LEAD, INITIAL Collection Time: 10/28/18 10:34 PM  
Result Value Ref Range Ventricular Rate 85 BPM  
 Atrial Rate 86 BPM  
 P-R Interval 142 ms QRS Duration 94 ms Q-T Interval 482 ms QTC Calculation (Bezet) 573 ms Calculated P Axis 59 degrees Calculated R Axis 46 degrees Calculated T Axis 39 degrees Diagnosis Normal sinus rhythm Nonspecific ST abnormality Prolonged QT When compared with ECG of 22-MAY-2017 16:38, 
QT has lengthened CXR Results  (Last 48 hours) 10/28/18 2112  XR CHEST PORT Final result Impression:  IMPRESSION: No acute cardiopulmonary process seen. Bilateral humeral head  
sclerosis, suggesting AVN Narrative:  EXAM:  XR CHEST PORT INDICATION:  Chest Pain COMPARISON:  10/2/2018 FINDINGS: A portable AP radiograph of the chest was obtained at 2103 hours. The  
patient is on a cardiac monitor. The lungs are clear. The cardiac and  
mediastinal contours and pulmonary vascularity are normal. There is sclerosis in  
both humeral heads. Medical Decision Making I am the first provider for this patient. I reviewed the vital signs, available nursing notes, past medical history, past surgical history, family history and social history. Vital Signs-Reviewed the patient's vital signs. Patient Vitals for the past 12 hrs: 
 Pulse Resp BP SpO2  
10/28/18 2245 82 17 106/83 100 % 10/28/18 2230 80 17 126/62 100 % 10/28/18 2215 81 17 122/59 100 % 10/28/18 2204 84 18 121/60 100 % 10/28/18 2100 84 23 112/60 100 % 10/28/18 2045 94 24 125/49 100 % 10/28/18 2039 94 21  100 % 10/28/18 2033   155/79   
10/28/18 2027 (!) 113 27  94 % Pulse Oximetry Analysis - 94% on RA Cardiac Monitor:  
Rate: 113 bpm 
Rhythm: Sinus Tachycardia 2027 ED EKG interpretation: 22:34 Rhythm: normal sinus rhythm; and regular .  Rate (approx.): 85; Axis: normal; AR Interval: normal; QRS interval: normal ; ST/T wave: non-specific changes; This EKG was interpreted by Roxana Last MD,ED Provider. Records Reviewed: Nursing Notes, Old Medical Records and Ambulance Run Sheet Provider Notes (Medical Decision Making): DDx: sickle cell crisis, acute anemia, acute chest syndrome ED Course:  
Initial assessment performed. The patients presenting problems have been discussed, and they are in agreement with the care plan formulated and outlined with them. I have encouraged them to ask questions as they arise throughout their visit. Consult Note: 
10:21 PM 
Roxana Last MD spoke with Lisandro Bray MD, Specialty: Hospitalist 
Discussed pt's hx, disposition, and available diagnostic and imaging results. Reviewed care plans. Consultant agrees with plans as outlined. Lisandro Bray MD will admit pt. Critical Care Time:  
0 minutes Disposition: 
Admit Note: 
10:22 PM 
Pt is being admitted by Lisandro Bray MD. The results of their tests and reason(s) for their admission have been discussed with pt and/or available family. They convey agreement and understanding for the need to be admitted and for admission diagnosis. Return to ED if worse Diagnosis Clinical Impression: 1. Sickle cell pain crisis ([address] 2. Non-intractable vomiting with nausea, unspecified vomiting type 3. Hypokalemia Attestations: This note is prepared by Kodi Ordonez, acting as Scribe for MD Roxana Hutchison MD: The scribe's documentation has been prepared under my direction and personally reviewed by me in its entirety. I confirm that the note above accurately reflects all work, treatment, procedures, and medical decision making performed by me.

## 2018-10-29 NOTE — PROGRESS NOTES
Oncology Nursing Communication Tool 7:40 PM 
10/29/2018 Bedside shift change report given to [address] [place] [address] (incoming nurse) by Prince Herman RN (outgoing [facility] Report included the following information SBAR. Shift Summary: Pain control Issues for physician to address: Oncology Shift Note Admission Date 10/28/2018 Admission Diagnosis Sickle cell pain crisis ([address] Code Status Full Code Consults None Cardiac Monitoring [] Yes [] No  
  
Purposeful Hourly Rounding [x] Yes   
Mukund Score Total Score: 2 Mukund score 3 or > [] Bed Alarm [] Avasys [] 1:1 sitter [] Patient refused (Place signed refusal form in chart) Pain Managed [x] Yes [] No  
 Key Pain Meds HYDROcodone-acetaminophen (NORCO)  mg tablet  (Taking) Take 1 Tab by mouth every six (6) hours as needed. Max Daily Amount: 4 Tabs. fentaNYL (DURAGESIC) 75 mcg/hr  (Taking) 1 Patch by TransDERmal route every seventy-two (72) hours. Max Daily Amount: 1 Patch. Influenza Vaccine Received Flu Vaccine for Current Season (usually Sept-March): No  
 Patient/Guardian Refused (Notify MD): Yes Oxygen needs? [] Room air Oxygen @  []1L    []2L    []3L   []4L    []5L   []6L Use home O2? [] Yes [] No 
Perform O2 challenge test using  smartphrase (.oxygenchallenge) Last bowel movement Last Bowel Movement Date: 10/28/18 
bowel movement Urinary Catheter LDAs Peripheral IV 10/29/18 Anterior;Right Antecubital (Active) Site Assessment Clean, dry, & intact 10/29/2018  9:00 AM  
Phlebitis Assessment 0 10/29/2018  9:00 AM  
Infiltration Assessment 0 10/29/2018  9:00 AM  
Dressing Status Clean, dry, & intact 10/29/2018  9:00 AM  
Dressing Type Tape;Transparent 10/29/2018  9:00 AM  
Hub Color/Line Status Pink; Infusing 10/29/2018  9:00 AM  
                  
  
Readmission Risk Assessment Tool Score Medium Risk [address] [facility] [address] Total Score 3 Has Seen PCP in Last 6 Months (Yes=3, No=0)  
 4 IP Visits Last 12 Months (1-3=4, 4=9, >4=11) 5 Pt. Coverage (Medicare=5 , Medicaid, or Self-Pay=4) 2 Charlson Comorbidity Score (Age + Comorbid Conditions) Criteria that do not apply:  
 . Living with Significant Other. Assisted Living. LTAC. SNF. or  
Rehab Patient Length of Stay (>5 days = 3) Expected Length of Stay 2d 16h Actual Length of Stay 1 Priscilla Luna RN

## 2018-10-29 NOTE — PROGRESS NOTES
Problem: Falls - Risk of 
Goal: *Absence of Falls Document Triston Pereyra Fall Risk and appropriate interventions in the flowsheet. Outcome: Progressing Towards Goal 
Fall Risk Interventions: 
Mobility Interventions: Communicate number of staff needed for ambulation/transfer, Patient to call before getting OOB, Utilize walker, cane, or other assistive device Medication Interventions: Assess postural VS orthostatic hypotension, Patient to call before getting OOB, Teach patient to arise slowly

## 2018-10-30 ENCOUNTER — APPOINTMENT (OUTPATIENT)
Dept: GENERAL RADIOLOGY | Age: 58
DRG: 812 | End: 2018-10-30
Attending: INTERNAL MEDICINE
Payer: MEDICARE

## 2018-10-30 LAB
ANION GAP SERPL CALC-SCNC: 7 MMOL/L (ref 5–15)
APPEARANCE UR: CLEAR
BACTERIA URNS QL MICRO: NEGATIVE /HPF
BASOPHILS # BLD: 0.1 K/UL (ref 0–0.1)
BASOPHILS NFR BLD: 1 % (ref 0–1)
BILIRUB UR QL: NEGATIVE
BLASTS NFR BLD MANUAL: 0 %
BUN SERPL-MCNC: 8 MG/DL (ref 6–20)
BUN/CREAT SERPL: 11 (ref 12–20)
CALCIUM SERPL-MCNC: 8.3 MG/DL (ref 8.5–10.1)
CHLORIDE SERPL-SCNC: 109 MMOL/L (ref 97–108)
CO2 SERPL-SCNC: 25 MMOL/L (ref 21–32)
COLOR UR: ABNORMAL
CREAT SERPL-MCNC: 0.76 MG/DL (ref 0.55–1.02)
DIFFERENTIAL METHOD BLD: ABNORMAL
EOSINOPHIL # BLD: 0 K/UL (ref 0–0.4)
EOSINOPHIL NFR BLD: 0 % (ref 0–7)
EPITH CASTS URNS QL MICRO: ABNORMAL /LPF
ERYTHROCYTE [DISTWIDTH] IN BLOOD BY AUTOMATED COUNT: 15.2 % (ref 11.5–14.5)
GLUCOSE SERPL-MCNC: 103 MG/DL (ref 65–100)
GLUCOSE UR STRIP.AUTO-MCNC: NEGATIVE MG/DL
HCT VFR BLD AUTO: 19.6 % (ref 35–47)
HGB BLD-MCNC: 6.7 G/DL (ref 11.5–16)
HGB UR QL STRIP: NEGATIVE
HYALINE CASTS URNS QL MICRO: ABNORMAL /LPF (ref 0–5)
IMM GRANULOCYTES # BLD: 0 K/UL
IMM GRANULOCYTES NFR BLD AUTO: 0 %
KETONES UR QL STRIP.AUTO: NEGATIVE MG/DL
LEUKOCYTE ESTERASE UR QL STRIP.AUTO: ABNORMAL
LYMPHOCYTES # BLD: 4.4 K/UL (ref 0.8–3.5)
LYMPHOCYTES NFR BLD: 34 % (ref 12–49)
MCH RBC QN AUTO: 31.3 PG (ref 26–34)
MCHC RBC AUTO-ENTMCNC: 34.2 G/DL (ref 30–36.5)
MCV RBC AUTO: 91.6 FL (ref 80–99)
METAMYELOCYTES NFR BLD MANUAL: 0 %
MONOCYTES # BLD: 0.8 K/UL (ref 0–1)
MONOCYTES NFR BLD: 6 % (ref 5–13)
MYELOCYTES NFR BLD MANUAL: 0 %
NEUTS BAND NFR BLD MANUAL: 0 % (ref 0–6)
NEUTS SEG # BLD: 7.7 K/UL (ref 1.8–8)
NEUTS SEG NFR BLD: 59 % (ref 32–75)
NITRITE UR QL STRIP.AUTO: NEGATIVE
NRBC # BLD: 0.04 K/UL (ref 0–0.01)
NRBC BLD-RTO: 0.3 PER 100 WBC
OTHER CELLS NFR BLD MANUAL: 0 %
PH UR STRIP: 6.5 [PH] (ref 5–8)
PLATELET # BLD AUTO: 239 K/UL (ref 150–400)
PMV BLD AUTO: 11.3 FL (ref 8.9–12.9)
POTASSIUM SERPL-SCNC: 3 MMOL/L (ref 3.5–5.1)
PROMYELOCYTES NFR BLD MANUAL: 0 %
PROT UR STRIP-MCNC: NEGATIVE MG/DL
RBC # BLD AUTO: 2.14 M/UL (ref 3.8–5.2)
RBC #/AREA URNS HPF: ABNORMAL /HPF (ref 0–5)
RBC MORPH BLD: ABNORMAL
RBC MORPH BLD: ABNORMAL
SODIUM SERPL-SCNC: 141 MMOL/L (ref 136–145)
SP GR UR REFRACTOMETRY: 1.01 (ref 1–1.03)
UA: UC IF INDICATED,UAUC: ABNORMAL
UROBILINOGEN UR QL STRIP.AUTO: 4 EU/DL (ref 0.2–1)
WBC # BLD AUTO: 13 K/UL (ref 3.6–11)
WBC URNS QL MICRO: ABNORMAL /HPF (ref 0–4)

## 2018-10-30 PROCEDURE — 36415 COLL VENOUS BLD VENIPUNCTURE: CPT | Performed by: INTERNAL MEDICINE

## 2018-10-30 PROCEDURE — 74011250637 HC RX REV CODE- 250/637: Performed by: INTERNAL MEDICINE

## 2018-10-30 PROCEDURE — 80048 BASIC METABOLIC PNL TOTAL CA: CPT | Performed by: INTERNAL MEDICINE

## 2018-10-30 PROCEDURE — 85027 COMPLETE CBC AUTOMATED: CPT | Performed by: INTERNAL MEDICINE

## 2018-10-30 PROCEDURE — 74011250636 HC RX REV CODE- 250/636: Performed by: INTERNAL MEDICINE

## 2018-10-30 PROCEDURE — 71045 X-RAY EXAM CHEST 1 VIEW: CPT

## 2018-10-30 PROCEDURE — 65270000015 HC RM PRIVATE ONCOLOGY

## 2018-10-30 PROCEDURE — 81001 URINALYSIS AUTO W/SCOPE: CPT | Performed by: INTERNAL MEDICINE

## 2018-10-30 PROCEDURE — 74011000250 HC RX REV CODE- 250: Performed by: INTERNAL MEDICINE

## 2018-10-30 RX ORDER — DEXTROSE, SODIUM CHLORIDE, AND POTASSIUM CHLORIDE 5; .45; .22 G/100ML; G/100ML; G/100ML
INJECTION INTRAVENOUS CONTINUOUS
Status: DISCONTINUED | OUTPATIENT
Start: 2018-10-30 | End: 2018-10-31

## 2018-10-30 RX ORDER — ACETAMINOPHEN 325 MG/1
650 TABLET ORAL
Status: DISCONTINUED | OUTPATIENT
Start: 2018-10-30 | End: 2018-11-05

## 2018-10-30 RX ORDER — PANTOPRAZOLE SODIUM 40 MG/1
40 TABLET, DELAYED RELEASE ORAL
Status: DISCONTINUED | OUTPATIENT
Start: 2018-10-30 | End: 2018-11-07 | Stop reason: HOSPADM

## 2018-10-30 RX ADMIN — DEXTROSE MONOHYDRATE, SODIUM CHLORIDE, AND POTASSIUM CHLORIDE: 50; 4.5; 2.24 INJECTION, SOLUTION INTRAVENOUS at 11:09

## 2018-10-30 RX ADMIN — HYDROMORPHONE HYDROCHLORIDE 1 MG: 1 INJECTION, SOLUTION INTRAMUSCULAR; INTRAVENOUS; SUBCUTANEOUS at 20:54

## 2018-10-30 RX ADMIN — Medication 10 ML: at 08:35

## 2018-10-30 RX ADMIN — HYDROMORPHONE HYDROCHLORIDE 1 MG: 1 INJECTION, SOLUTION INTRAMUSCULAR; INTRAVENOUS; SUBCUTANEOUS at 12:14

## 2018-10-30 RX ADMIN — PANTOPRAZOLE SODIUM 40 MG: 40 TABLET, DELAYED RELEASE ORAL at 08:34

## 2018-10-30 RX ADMIN — SODIUM CHLORIDE 5 MG: 9 INJECTION INTRAMUSCULAR; INTRAVENOUS; SUBCUTANEOUS at 12:13

## 2018-10-30 RX ADMIN — HYDROMORPHONE HYDROCHLORIDE 1 MG: 1 INJECTION, SOLUTION INTRAMUSCULAR; INTRAVENOUS; SUBCUTANEOUS at 08:34

## 2018-10-30 RX ADMIN — SODIUM CHLORIDE 5 MG: 9 INJECTION INTRAMUSCULAR; INTRAVENOUS; SUBCUTANEOUS at 20:52

## 2018-10-30 RX ADMIN — ACETAMINOPHEN 650 MG: 325 TABLET ORAL at 16:13

## 2018-10-30 RX ADMIN — HYDROMORPHONE HYDROCHLORIDE 1 MG: 1 INJECTION, SOLUTION INTRAMUSCULAR; INTRAVENOUS; SUBCUTANEOUS at 03:45

## 2018-10-30 RX ADMIN — PANTOPRAZOLE SODIUM 40 MG: 40 TABLET, DELAYED RELEASE ORAL at 16:13

## 2018-10-30 RX ADMIN — SODIUM CHLORIDE 5 MG: 9 INJECTION INTRAMUSCULAR; INTRAVENOUS; SUBCUTANEOUS at 03:43

## 2018-10-30 RX ADMIN — SODIUM CHLORIDE 5 MG: 9 INJECTION INTRAMUSCULAR; INTRAVENOUS; SUBCUTANEOUS at 16:08

## 2018-10-30 RX ADMIN — ENOXAPARIN SODIUM 40 MG: 100 INJECTION SUBCUTANEOUS at 08:34

## 2018-10-30 RX ADMIN — DEXTROSE MONOHYDRATE, SODIUM CHLORIDE, AND POTASSIUM CHLORIDE: 50; 4.5; 2.24 INJECTION, SOLUTION INTRAVENOUS at 20:59

## 2018-10-30 RX ADMIN — CITALOPRAM HYDROBROMIDE 10 MG: 20 TABLET ORAL at 08:34

## 2018-10-30 RX ADMIN — FOLIC ACID 1 MG: 1 TABLET ORAL at 08:34

## 2018-10-30 RX ADMIN — HYDROMORPHONE HYDROCHLORIDE 1 MG: 1 INJECTION, SOLUTION INTRAMUSCULAR; INTRAVENOUS; SUBCUTANEOUS at 16:11

## 2018-10-30 RX ADMIN — Medication 10 ML: at 16:10

## 2018-10-30 RX ADMIN — DEXTROSE MONOHYDRATE, SODIUM CHLORIDE, AND POTASSIUM CHLORIDE 100 ML/HR: 50; 4.5; 1.49 INJECTION, SOLUTION INTRAVENOUS at 09:14

## 2018-10-30 RX ADMIN — SODIUM CHLORIDE 5 MG: 9 INJECTION INTRAMUSCULAR; INTRAVENOUS; SUBCUTANEOUS at 08:31

## 2018-10-30 NOTE — PROGRESS NOTES
Pt reports cold like symptoms with green appearing mucus from nose. Attempted to page Dr. Elba Luna multiple times with no response from the office. Will try again later.

## 2018-10-30 NOTE — PROGRESS NOTES
Physician notified of pt's temp and reports of green mucus. Dr. Pedrito Bianchi ordered tyllenol 650 q 4 PRN, urine analysis and culture as well as a chest x ray.

## 2018-10-30 NOTE — PROGRESS NOTES
Oncology Nursing Communication Tool 6:49 PM 
10/30/2018 \"Bedside\" shift change report given to Gabriella Lyons RN (incoming nurse) by Sebastien Lai (outgoing nurse) on [facility]. Report included the following information Shift Summary: increasing temp, sinus infection like symptoms, awaiting chest xray, pain decreased with dilaudid but not to patient's goal 
  
 Issues for physician to address: pain management, sinus symptoms Oncology Shift Note Admission Date 10/28/2018 Admission Diagnosis Sickle cell pain crisis ([facility] [address] Code Status Full Code Consults None Cardiac Monitoring [x] Yes [] No  
  
Purposeful Hourly Rounding [x] Yes   
Mukund Score Total Score: 2 Mukund score 3 or > [] Bed Alarm [] Avasys [] 1:1 sitter [] Patient refused (Place signed refusal form in chart) Pain Managed [x] Yes [] No  
 Key Pain Meds HYDROcodone-acetaminophen (NORCO)  mg tablet  (Taking) Take 1 Tab by mouth every six (6) hours as needed. Max Daily Amount: 4 Tabs. fentaNYL (DURAGESIC) 75 mcg/hr  (Taking) 1 Patch by TransDERmal route every seventy-two (72) hours. Max Daily Amount: 1 Patch. Influenza Vaccine Received Flu Vaccine for Current Season (usually Sept-March): No  
 Patient/Guardian Refused (Notify MD): Yes Oxygen needs? [x] Room air Oxygen @  []1L    []2L    []3L   []4L    []5L   []6L Use home O2? [] Yes [] No 
Perform O2 challenge test using  smartphrase (.oxygenchallenge) Last bowel movement Last Bowel Movement Date: 10/28/18 
bowel movement Urinary Catheter LDAs Peripheral IV 10/29/18 Anterior;Right Antecubital (Active) Site Assessment Clean, dry, & intact 10/30/2018  3:38 PM  
Phlebitis Assessment 0 10/30/2018  3:38 PM  
Infiltration Assessment 0 10/30/2018  3:38 PM  
Dressing Status Clean, dry, & intact 10/30/2018  8:00 AM  
Dressing Type Transparent;Tape 10/30/2018  3:38 PM  
 Hub Color/Line Status Pink; Infusing 10/30/2018  3:38 PM  
                  
  
Readmission Risk Assessment Tool Score Medium Risk 15 Total Score 3 Has Seen PCP in Last 6 Months (Yes=3, No=0)  
 4 IP Visits Last 12 Months (1-3=4, 4=9, >4=11) 5 Pt. Coverage (Medicare=5 , Medicaid, or Self-Pay=4) 2 Charlson Comorbidity Score (Age + Comorbid Conditions) Criteria that do not apply:  
 . Living with Significant Other. Assisted Living. LTAC. SNF. or  
Rehab Patient Length of Stay (>5 days = 3) Expected Length of Stay 2d 16h Actual Length of Stay 2 Heather Gilliland

## 2018-10-30 NOTE — PROGRESS NOTES
Oncology Interdisciplinary rounds were held today to discuss patient plan of care and outcomes. The following members were present: Nursing, Case Management, Pharmacy and Dietary. Actual Length of Stay: 2 DRG GLOS: 2.7 Expected Length of Stay: 2d 16h Plan               Mobility         Discharge Plan IVF Pain control Up with assistance Home 11/2

## 2018-10-30 NOTE — PROGRESS NOTES
Oncology Nursing Communication Tool 
6:08 AM 
10/30/2018 Bedside shift change report given to Lucretia Miramontes RN (incoming nurse) by Jonny Gresham (outgoing nurse) on [facility]. Report included the following information SBAR, Kardex, MAR and Recent Results. Shift Summary: Patient rested well throughout night; pain controlled; PRN dose x2 Issues for physician to address: Oncology Shift Note Admission Date 10/28/2018 Admission Diagnosis Sickle cell pain crisis ([address] Code Status Full Code Consults None Cardiac Monitoring [] Yes [] No  
  
Purposeful Hourly Rounding [] Yes   
Mukund Score Total Score: 2 Mukund score 3 or > [] Bed Alarm [] Avasys [] 1:1 sitter [] Patient refused (Place signed refusal form in chart) Pain Managed [] Yes [] No  
 Key Pain Meds HYDROcodone-acetaminophen (NORCO)  mg tablet  (Taking) Take 1 Tab by mouth every six (6) hours as needed. Max Daily Amount: 4 Tabs. fentaNYL (DURAGESIC) 75 mcg/hr  (Taking) 1 Patch by TransDERmal route every seventy-two (72) hours. Max Daily Amount: 1 Patch. Influenza Vaccine Received Flu Vaccine for Current Season (usually Sept-March): No  
 Patient/Guardian Refused (Notify MD): Yes Oxygen needs? [] Room air Oxygen @  []1L    []2L    []3L   []4L    []5L   []6L Use home O2? [] Yes [] No 
Perform O2 challenge test using  smartphrase (.oxygenchallenge) Last bowel movement Last Bowel Movement Date: 10/28/18 
bowel movement Urinary Catheter LDAs Peripheral IV 10/29/18 Anterior;Right Antecubital (Active) Site Assessment Clean, dry, & intact 10/30/2018  3:49 AM  
Phlebitis Assessment 0 10/30/2018  3:49 AM  
Infiltration Assessment 0 10/30/2018  3:49 AM  
Dressing Status Clean, dry, & intact 10/30/2018  3:49 AM  
Dressing Type Tape;Transparent 10/30/2018  3:49 AM  
Hub Color/Line Status Pink; Infusing 10/30/2018  3:49 AM  
                  
  
 Readmission Risk Assessment Tool Score Medium Risk 15 Total Score 3 Has Seen PCP in Last 6 Months (Yes=3, No=0)  
 4 IP Visits Last 12 Months (1-3=4, 4=9, >4=11) 5 Pt. Coverage (Medicare=5 , Medicaid, or Self-Pay=4) 2 Charlson Comorbidity Score (Age + Comorbid Conditions) Criteria that do not apply:  
 . Living with Significant Other. Assisted Living. LTAC. SNF. or  
Rehab Patient Length of Stay (>5 days = 3) Expected Length of Stay 2d 16h Actual Length of Stay 2 Lawayne Gitelman

## 2018-10-30 NOTE — PROGRESS NOTES
General Daily Progress Note Admit Date: 10/28/2018 Subjective:  
 
Patient complains of generalized pain. Current Facility-Administered Medications Medication Dose Route Frequency  folic acid (FOLVITE) tablet 1 mg  1 mg Oral DAILY  pantoprazole (PROTONIX) tablet 40 mg  40 mg Oral ACB  citalopram (CELEXA) tablet 10 mg  10 mg Oral DAILY  cyclobenzaprine (FLEXERIL) tablet 10 mg  10 mg Oral TID PRN  
 sodium chloride (NS) flush 5-10 mL  5-10 mL IntraVENous Q8H  
 sodium chloride (NS) flush 5-10 mL  5-10 mL IntraVENous PRN  
 enoxaparin (LOVENOX) injection 40 mg  40 mg SubCUTAneous Q24H  
 ondansetron (ZOFRAN) injection 4 mg  4 mg IntraVENous Q6H PRN  
 bisacodyl (DULCOLAX) tablet 5 mg  5 mg Oral DAILY PRN  prochlorperazine (COMPAZINE) with saline injection 5 mg  5 mg IntraVENous Q4H PRN  
 saline peripheral flush soln 10 mL  10 mL InterCATHeter PRN  
 losartan (COZAAR) 50 mg, hydroCHLOROthiazide (MICROZIDE) 12.5 mg   Oral DAILY  0.9% sodium chloride with KCl 20 mEq/L infusion   IntraVENous CONTINUOUS  
 HYDROmorphone (PF) (DILAUDID) injection 1 mg  1 mg IntraVENous Q4H PRN  
 diphenhydrAMINE (BENADRYL) injection 25 mg  25 mg IntraVENous Q6H PRN Review of Systems A comprehensive review of systems was negative. Objective:  
 
Patient Vitals for the past 24 hrs: 
 BP Temp Pulse Resp SpO2 Weight 10/29/18 2021 115/72 99.8 °F (37.7 °C) 91 16 100 %   
10/29/18 1458 143/62 99.4 °F (37.4 °C) 92 18 100 %   
10/29/18 1102 140/71 98.5 °F (36.9 °C) 89 18 99 %   
10/29/18 0720      170 lb 10.2 oz (77.4 kg) 10/29/18 0717 113/61 99 °F (37.2 °C) 88 18 100 %   
10/29/18 0333 154/77 98.6 °F (37 °C) 85 17 100 %   
10/29/18 0048 (!) 123/92 98.7 °F (37.1 °C) 83 17 99 %   
10/29/18 0015 132/64 97.9 °F (36.6 °C) 87 16 99 %   
10/28/18 2345 142/83  85 12 99 %   
10/28/18 2330 140/75  81 13 100 %   
10/28/18 2315 122/57  78 22 100 %   
10/28/18 2300 127/59  77 18 100 %   
 10/28/18 2245 106/83  82 17 100 %   
10/28/18 2230 126/62  80 17 100 %   
10/28/18 2215 122/59  81 17 100 %   
10/28/18 2204 121/60  84 18 100 %  No intake/output data recorded. 10/28 0701 - 10/29 1900 In: 588.3 [P.O.:100; I.V.:488.3] Out: 900 [Urine:900] Physical Exam:  
Visit Vitals /72 (BP 1 Location: Left arm, BP Patient Position: At rest) Pulse 91 Temp 99.8 °F (37.7 °C) Resp 16 Wt 170 lb 10.2 oz (77.4 kg) SpO2 100% Breastfeeding? No  
BMI 23.80 kg/m² General appearance: alert, cooperative, no distress, appears stated age Neck: supple, symmetrical, trachea midline, no adenopathy, thyroid: not enlarged, symmetric, no tenderness/mass/nodules, no carotid bruit and no JVD Lungs: clear to auscultation bilaterally Heart: regular rate and rhythm, S1, S2 normal, no murmur, click, rub or gallop Abdomen: soft, non-tender. Bowel sounds normal. No masses,  no organomegaly Extremities: extremities normal, atraumatic, no cyanosis or edema Data Review Recent Results (from the past 24 hour(s)) EKG, 12 LEAD, INITIAL Collection Time: 10/28/18 10:34 PM  
Result Value Ref Range Ventricular Rate 85 BPM  
 Atrial Rate 86 BPM  
 P-R Interval 142 ms QRS Duration 94 ms Q-T Interval 482 ms QTC Calculation (Bezet) 573 ms Calculated P Axis 59 degrees Calculated R Axis 46 degrees Calculated T Axis 39 degrees Diagnosis Normal sinus rhythm Nonspecific ST abnormality Prolonged QT When compared with ECG of 22-MAY-2017 16:38, 
QT has lengthened Confirmed by Nalini Burr ([id number]) on 10/29/2018 8:56:58 AM 
  
TROPONIN I Collection Time: 10/28/18 11:52 PM  
Result Value Ref Range Troponin-I, Qt. <0.05 <0.05 ng/mL TROPONIN I Collection Time: 10/29/18  3:30 AM  
Result Value Ref Range Troponin-I, Qt. <0.05 <0.05 ng/mL METABOLIC PANEL, BASIC Collection Time: 10/29/18  7:06 AM  
Result Value Ref Range  Sodium 139 136 - 145 mmol/L  
 Potassium 3.3 (L) 3.5 - 5.1 mmol/L Chloride 107 97 - 108 mmol/L  
 CO2 23 21 - 32 mmol/L Anion gap 9 5 - 15 mmol/L Glucose 140 (H) 65 - 100 mg/dL BUN 17 6 - 20 MG/DL Creatinine 0.93 0.55 - 1.02 MG/DL  
 BUN/Creatinine ratio 18 12 - 20 GFR est AA >60 >60 ml/min/1.73m2 GFR est non-AA >60 >60 ml/min/1.73m2 Calcium 9.0 8.5 - 10.1 MG/DL  
CBC WITH AUTOMATED DIFF Collection Time: 10/29/18  7:06 AM  
Result Value Ref Range WBC 10.5 3.6 - 11.0 K/uL  
 RBC 2.39 (L) 3.80 - 5.20 M/uL HGB 7.6 (L) 11.5 - 16.0 g/dL HCT 21.7 (L) 35.0 - 47.0 % MCV 90.8 80.0 - 99.0 FL  
 MCH 31.8 26.0 - 34.0 PG  
 MCHC 35.0 30.0 - 36.5 g/dL  
 RDW 14.9 (H) 11.5 - 14.5 % PLATELET 890 205 - 714 K/uL MPV 11.3 8.9 - 12.9 FL  
 NRBC 0.2 (H) 0  WBC ABSOLUTE NRBC 0.02 (H) 0.00 - 0.01 K/uL NEUTROPHILS 83 (H) 32 - 75 % LYMPHOCYTES 9 (L) 12 - 49 % MONOCYTES 8 5 - 13 % EOSINOPHILS 0 0 - 7 % BASOPHILS 0 0 - 1 % IMMATURE GRANULOCYTES 1 (H) 0.0 - 0.5 % ABS. NEUTROPHILS 8.7 (H) 1.8 - 8.0 K/UL  
 ABS. LYMPHOCYTES 0.9 0.8 - 3.5 K/UL  
 ABS. MONOCYTES 0.8 0.0 - 1.0 K/UL  
 ABS. EOSINOPHILS 0.0 0.0 - 0.4 K/UL  
 ABS. BASOPHILS 0.0 0.0 - 0.1 K/UL  
 ABS. IMM. GRANS. 0.1 (H) 0.00 - 0.04 K/UL  
 DF AUTOMATED Assessment:  
 
Active Problems: 
  [facility] (9/8/2018) Plan: 1. Patient developed a viral gastritis. This is gradually improving. Continue symptomatic treatment. Marlyn Francis 2.  Unfortunately she developed a painful crises. Continue treatment . 3. Hydration also continues.

## 2018-10-30 NOTE — PROGRESS NOTES
General Daily Progress Note Admit Date: 10/28/2018 Subjective:  
 
Patient continues to complain of mild nausea. Current Facility-Administered Medications Medication Dose Route Frequency  dextrose 5 % - 0.45% NaCl 1,000 mL with potassium chloride 30 mEq infusion   IntraVENous CONTINUOUS  
 pantoprazole (PROTONIX) tablet 40 mg  40 mg Oral ACB&D  
 folic acid (FOLVITE) tablet 1 mg  1 mg Oral DAILY  citalopram (CELEXA) tablet 10 mg  10 mg Oral DAILY  cyclobenzaprine (FLEXERIL) tablet 10 mg  10 mg Oral TID PRN  
 sodium chloride (NS) flush 5-10 mL  5-10 mL IntraVENous Q8H  
 sodium chloride (NS) flush 5-10 mL  5-10 mL IntraVENous PRN  
 enoxaparin (LOVENOX) injection 40 mg  40 mg SubCUTAneous Q24H  
 ondansetron (ZOFRAN) injection 4 mg  4 mg IntraVENous Q6H PRN  
 bisacodyl (DULCOLAX) tablet 5 mg  5 mg Oral DAILY PRN  prochlorperazine (COMPAZINE) with saline injection 5 mg  5 mg IntraVENous Q4H PRN  
 saline peripheral flush soln 10 mL  10 mL InterCATHeter PRN  
 HYDROmorphone (PF) (DILAUDID) injection 1 mg  1 mg IntraVENous Q4H PRN  
 diphenhydrAMINE (BENADRYL) injection 25 mg  25 mg IntraVENous Q6H PRN Review of Systems A comprehensive review of systems was negative. Objective:  
 
Patient Vitals for the past 24 hrs: 
 BP Temp Pulse Resp SpO2  
10/30/18 0800 112/68 99.1 °F (37.3 °C) 76 18 100 % 10/30/18 0343 120/73 99.9 °F (37.7 °C) 81 16 100 % 10/29/18 2248 126/76 99 °F (37.2 °C) 84 16 100 % 10/29/18 2021 115/72 99.8 °F (37.7 °C) 91 16 100 % 10/29/18 1458 143/62 99.4 °F (37.4 °C) 92 18 100 % 10/29/18 1102 140/71 98.5 °F (36.9 °C) 89 18 99 % No intake/output data recorded. 10/28 1901 - 10/30 0700 In: 2100 [P.O.:100; I.V.:1025] Out: 900 [Urine:900] Physical Exam:  
Visit Vitals /68 (BP 1 Location: Left arm, BP Patient Position: At rest) Pulse 76 Temp 99.1 °F (37.3 °C) Resp 18 Wt 170 lb 10.2 oz (77.4 kg) SpO2 100% Breastfeeding?  No  
 BMI 23.80 kg/m² General appearance: alert, cooperative, no distress, appears stated age Neck: supple, symmetrical, trachea midline, no adenopathy, thyroid: not enlarged, symmetric, no tenderness/mass/nodules, no carotid bruit and no JVD Lungs: clear to auscultation bilaterally Heart: regular rate and rhythm, S1, S2 normal, no murmur, click, rub or gallop Abdomen: soft, non-tender. Bowel sounds normal. No masses,  no organomegaly Extremities: extremities normal, atraumatic, no cyanosis or edema Data Review Recent Results (from the past 24 hour(s)) METABOLIC PANEL, BASIC Collection Time: 10/30/18  3:38 AM  
Result Value Ref Range Sodium 141 136 - 145 mmol/L Potassium 3.0 (L) 3.5 - 5.1 mmol/L Chloride 109 (H) 97 - 108 mmol/L  
 CO2 25 21 - 32 mmol/L Anion gap 7 5 - 15 mmol/L Glucose 103 (H) 65 - 100 mg/dL BUN 8 6 - 20 MG/DL Creatinine 0.76 0.55 - 1.02 MG/DL  
 BUN/Creatinine ratio 11 (L) 12 - 20 GFR est AA >60 >60 ml/min/1.73m2 GFR est non-AA >60 >60 ml/min/1.73m2 Calcium 8.3 (L) 8.5 - 10.1 MG/DL  
CBC WITH MANUAL DIFF Collection Time: 10/30/18  3:38 AM  
Result Value Ref Range WBC 13.0 (H) 3.6 - 11.0 K/uL  
 RBC 2.14 (L) 3.80 - 5.20 M/uL HGB 6.7 (L) 11.5 - 16.0 g/dL HCT 19.6 (L) 35.0 - 47.0 % MCV 91.6 80.0 - 99.0 FL  
 MCH 31.3 26.0 - 34.0 PG  
 MCHC 34.2 30.0 - 36.5 g/dL  
 RDW 15.2 (H) 11.5 - 14.5 % PLATELET 685 562 - 591 K/uL MPV 11.3 8.9 - 12.9 FL  
 NRBC 0.3 (H) 0  WBC ABSOLUTE NRBC 0.04 (H) 0.00 - 0.01 K/uL NEUTROPHILS 59 32 - 75 % BAND NEUTROPHILS 0 0 - 6 % LYMPHOCYTES 34 12 - 49 % MONOCYTES 6 5 - 13 % EOSINOPHILS 0 0 - 7 % BASOPHILS 1 0 - 1 % METAMYELOCYTES 0 0 % MYELOCYTES 0 0 % PROMYELOCYTES 0 0 % BLASTS 0 0 % OTHER CELL 0 0 IMMATURE GRANULOCYTES 0 %  
 ABS. NEUTROPHILS 7.7 1.8 - 8.0 K/UL  
 ABS. LYMPHOCYTES 4.4 (H) 0.8 - 3.5 K/UL  
 ABS. MONOCYTES 0.8 0.0 - 1.0 K/UL ABS. EOSINOPHILS 0.0 0.0 - 0.4 K/UL  
 ABS. BASOPHILS 0.1 0.0 - 0.1 K/UL  
 ABS. IMM. GRANS. 0.0 K/UL  
 DF MANUAL    
 RBC COMMENTS ANISOCYTOSIS 1+ 
    
 RBC COMMENTS TARGET CELLS 3+ Assessment:  
 
Active Problems: 
  Sickle cell pain crisis ([facility] (9/8/2018) Plan:  
 
1.  GI symptoms persist but improving. 2.  Painful crisis continues. Modest drop in hemoglobin. Continue vigilance 3. Continue hydration and correction of hypokalemia.

## 2018-10-31 LAB
ANION GAP SERPL CALC-SCNC: 8 MMOL/L (ref 5–15)
BASOPHILS # BLD: 0.1 K/UL (ref 0–0.1)
BASOPHILS NFR BLD: 1 % (ref 0–1)
BLASTS NFR BLD MANUAL: 0 %
BUN SERPL-MCNC: 3 MG/DL (ref 6–20)
BUN/CREAT SERPL: 4 (ref 12–20)
CALCIUM SERPL-MCNC: 8.4 MG/DL (ref 8.5–10.1)
CHLORIDE SERPL-SCNC: 112 MMOL/L (ref 97–108)
CO2 SERPL-SCNC: 19 MMOL/L (ref 21–32)
CREAT SERPL-MCNC: 0.77 MG/DL (ref 0.55–1.02)
EOSINOPHIL # BLD: 0.2 K/UL (ref 0–0.4)
EOSINOPHIL NFR BLD: 2 % (ref 0–7)
ERYTHROCYTE [DISTWIDTH] IN BLOOD BY AUTOMATED COUNT: 15.1 % (ref 11.5–14.5)
GLUCOSE SERPL-MCNC: 91 MG/DL (ref 65–100)
HCT VFR BLD AUTO: 22.2 % (ref 35–47)
HGB BLD-MCNC: 7.4 G/DL (ref 11.5–16)
IMM GRANULOCYTES # BLD: 0 K/UL
IMM GRANULOCYTES NFR BLD AUTO: 0 %
LYMPHOCYTES # BLD: 3.8 K/UL (ref 0.8–3.5)
LYMPHOCYTES NFR BLD: 45 % (ref 12–49)
MCH RBC QN AUTO: 31.4 PG (ref 26–34)
MCHC RBC AUTO-ENTMCNC: 33.3 G/DL (ref 30–36.5)
MCV RBC AUTO: 94.1 FL (ref 80–99)
METAMYELOCYTES NFR BLD MANUAL: 0 %
MONOCYTES # BLD: 1.6 K/UL (ref 0–1)
MONOCYTES NFR BLD: 18 % (ref 5–13)
MYELOCYTES NFR BLD MANUAL: 0 %
NEUTS BAND NFR BLD MANUAL: 0 % (ref 0–6)
NEUTS SEG # BLD: 3 K/UL (ref 1.8–8)
NEUTS SEG NFR BLD: 34 % (ref 32–75)
NRBC # BLD: 0.05 K/UL (ref 0–0.01)
NRBC BLD-RTO: 0.6 PER 100 WBC
OTHER CELLS NFR BLD MANUAL: 0 %
PLATELET # BLD AUTO: 258 K/UL (ref 150–400)
PMV BLD AUTO: 11.4 FL (ref 8.9–12.9)
POTASSIUM SERPL-SCNC: 3.9 MMOL/L (ref 3.5–5.1)
PROMYELOCYTES NFR BLD MANUAL: 0 %
RBC # BLD AUTO: 2.36 M/UL (ref 3.8–5.2)
RBC MORPH BLD: ABNORMAL
SODIUM SERPL-SCNC: 139 MMOL/L (ref 136–145)
WBC # BLD AUTO: 8.7 K/UL (ref 3.6–11)

## 2018-10-31 PROCEDURE — 74011250636 HC RX REV CODE- 250/636: Performed by: INTERNAL MEDICINE

## 2018-10-31 PROCEDURE — 65270000015 HC RM PRIVATE ONCOLOGY

## 2018-10-31 PROCEDURE — 85027 COMPLETE CBC AUTOMATED: CPT | Performed by: INTERNAL MEDICINE

## 2018-10-31 PROCEDURE — 36415 COLL VENOUS BLD VENIPUNCTURE: CPT | Performed by: INTERNAL MEDICINE

## 2018-10-31 PROCEDURE — 74011000250 HC RX REV CODE- 250: Performed by: INTERNAL MEDICINE

## 2018-10-31 PROCEDURE — 80048 BASIC METABOLIC PNL TOTAL CA: CPT | Performed by: INTERNAL MEDICINE

## 2018-10-31 PROCEDURE — 74011250637 HC RX REV CODE- 250/637: Performed by: INTERNAL MEDICINE

## 2018-10-31 PROCEDURE — 74011000258 HC RX REV CODE- 258: Performed by: INTERNAL MEDICINE

## 2018-10-31 RX ADMIN — DEXTROSE MONOHYDRATE, SODIUM CHLORIDE, AND POTASSIUM CHLORIDE: 50; 4.5; 2.24 INJECTION, SOLUTION INTRAVENOUS at 19:15

## 2018-10-31 RX ADMIN — SODIUM CHLORIDE 5 MG: 9 INJECTION INTRAMUSCULAR; INTRAVENOUS; SUBCUTANEOUS at 19:51

## 2018-10-31 RX ADMIN — LORATADINE, PSEUDOEPHEDRINE SULFATE 1 TABLET: 5; 120 TABLET, FILM COATED, EXTENDED RELEASE ORAL at 19:51

## 2018-10-31 RX ADMIN — LORATADINE, PSEUDOEPHEDRINE SULFATE 1 TABLET: 5; 120 TABLET, FILM COATED, EXTENDED RELEASE ORAL at 13:28

## 2018-10-31 RX ADMIN — DEXTROSE MONOHYDRATE, SODIUM CHLORIDE, AND POTASSIUM CHLORIDE: 50; 4.5; 2.24 INJECTION, SOLUTION INTRAVENOUS at 06:39

## 2018-10-31 RX ADMIN — HYDROMORPHONE HYDROCHLORIDE 1 MG: 1 INJECTION, SOLUTION INTRAMUSCULAR; INTRAVENOUS; SUBCUTANEOUS at 15:43

## 2018-10-31 RX ADMIN — ACETAMINOPHEN 650 MG: 325 TABLET ORAL at 14:45

## 2018-10-31 RX ADMIN — PANTOPRAZOLE SODIUM 40 MG: 40 TABLET, DELAYED RELEASE ORAL at 08:39

## 2018-10-31 RX ADMIN — PANTOPRAZOLE SODIUM 40 MG: 40 TABLET, DELAYED RELEASE ORAL at 15:43

## 2018-10-31 RX ADMIN — Medication 10 ML: at 23:00

## 2018-10-31 RX ADMIN — HYDROMORPHONE HYDROCHLORIDE 1 MG: 1 INJECTION, SOLUTION INTRAMUSCULAR; INTRAVENOUS; SUBCUTANEOUS at 02:35

## 2018-10-31 RX ADMIN — HYDROMORPHONE HYDROCHLORIDE 1 MG: 1 INJECTION, SOLUTION INTRAMUSCULAR; INTRAVENOUS; SUBCUTANEOUS at 19:52

## 2018-10-31 RX ADMIN — SODIUM CHLORIDE 5 MG: 9 INJECTION INTRAMUSCULAR; INTRAVENOUS; SUBCUTANEOUS at 23:57

## 2018-10-31 RX ADMIN — SODIUM CHLORIDE 5 MG: 9 INJECTION INTRAMUSCULAR; INTRAVENOUS; SUBCUTANEOUS at 11:18

## 2018-10-31 RX ADMIN — HYDROMORPHONE HYDROCHLORIDE 1 MG: 1 INJECTION, SOLUTION INTRAMUSCULAR; INTRAVENOUS; SUBCUTANEOUS at 23:57

## 2018-10-31 RX ADMIN — CITALOPRAM HYDROBROMIDE 10 MG: 20 TABLET ORAL at 08:39

## 2018-10-31 RX ADMIN — SODIUM CHLORIDE 5 MG: 9 INJECTION INTRAMUSCULAR; INTRAVENOUS; SUBCUTANEOUS at 06:37

## 2018-10-31 RX ADMIN — HYDROMORPHONE HYDROCHLORIDE 1 MG: 1 INJECTION, SOLUTION INTRAMUSCULAR; INTRAVENOUS; SUBCUTANEOUS at 11:18

## 2018-10-31 RX ADMIN — POTASSIUM CHLORIDE: 149 INJECTION, SOLUTION, CONCENTRATE INTRAVENOUS at 22:00

## 2018-10-31 RX ADMIN — Medication 5 ML: at 14:00

## 2018-10-31 RX ADMIN — SODIUM CHLORIDE 5 MG: 9 INJECTION INTRAMUSCULAR; INTRAVENOUS; SUBCUTANEOUS at 15:42

## 2018-10-31 RX ADMIN — SODIUM CHLORIDE 5 MG: 9 INJECTION INTRAMUSCULAR; INTRAVENOUS; SUBCUTANEOUS at 02:33

## 2018-10-31 RX ADMIN — HYDROMORPHONE HYDROCHLORIDE 1 MG: 1 INJECTION, SOLUTION INTRAMUSCULAR; INTRAVENOUS; SUBCUTANEOUS at 06:40

## 2018-10-31 RX ADMIN — FOLIC ACID 1 MG: 1 TABLET ORAL at 08:39

## 2018-10-31 RX ADMIN — ENOXAPARIN SODIUM 40 MG: 100 INJECTION SUBCUTANEOUS at 08:39

## 2018-10-31 NOTE — PROGRESS NOTES
Bedside shift change report given to Minnie (oncoming nurse) by Sindhu Chen (offgoing nurse). Report included the following information SBAR, Kardex, MAR and Recent Results.

## 2018-10-31 NOTE — PROGRESS NOTES
Reason for Admission:   Sickle Cell Crisis and N/V 
               
RRAT Score:     14 - ACO Do you (patient/family) have any concerns for transition/discharge? No concerns about discharge - plans to return home independently - adult daughter in home to assist if needed Plan for utilizing home health:     No past Hx of home health - no anticipated needs Likelihood of readmission? Moderate Transition of Care Plan:      67 yo female w/ PMH significant for HTN, anemia, sickle cell disease presented to ED with complaints of chest and back pain with nausea and vomiting. Patient states she has had multiple admissions to hospital for sickle cell disease and is followed by Dr. Slim Peña. Patient states she is normally independent with all care, does not currently work, but maintains independence with all ADL's, including driving. Patient lives w/ her adult daughter in private one story home. Patient uses [facility] and has [employer] and [employer]. She denies any difficulties with costs of medications. Patient anticipates discharge back home with no anticipated services. Care Management Interventions PCP Verified by CM: Yes(Dr. Nataly Couch) Transition of Care Consult (CM Consult): Other(Initial CM Assessment) Physical Therapy Consult: No 
Occupational Therapy Consult: No 
Speech Therapy Consult: No 
Current Support Network: Own Home, Family Lives Nearby, Other(Daughter lives w/ patient) Confirm Follow Up Transport: Self(Patient drives, family assists as needed) Plan discussed with Pt/Family/Caregiver: Yes(Patient alert and oriented x 3 - provided all interview information) Dewey Senior, RN, BSN, ACM  - Medical Oncology [phone]

## 2018-10-31 NOTE — PROGRESS NOTES
Problem: Falls - Risk of 
Goal: *Absence of Falls Document Maira Hussein Fall Risk and appropriate interventions in the flowsheet. Outcome: Progressing Towards Goal 
Fall Risk Interventions: 
Mobility Interventions: Communicate number of staff needed for ambulation/transfer Medication Interventions: Teach patient to arise slowly, Patient to call before getting OOB, Evaluate medications/consider consulting pharmacy

## 2018-10-31 NOTE — PROGRESS NOTES
Bedside shift change report given to Sachin Nichole (oncoming nurse) by Sandra Georges (offgoing nurse). Report included the following information SBAR, Kardex, MAR and Recent Results.

## 2018-11-01 LAB
ANION GAP SERPL CALC-SCNC: 7 MMOL/L (ref 5–15)
BUN SERPL-MCNC: 3 MG/DL (ref 6–20)
BUN/CREAT SERPL: 5 (ref 12–20)
CALCIUM SERPL-MCNC: 7.7 MG/DL (ref 8.5–10.1)
CHLORIDE SERPL-SCNC: 110 MMOL/L (ref 97–108)
CO2 SERPL-SCNC: 21 MMOL/L (ref 21–32)
CREAT SERPL-MCNC: 0.63 MG/DL (ref 0.55–1.02)
GLUCOSE SERPL-MCNC: 88 MG/DL (ref 65–100)
POTASSIUM SERPL-SCNC: 3.7 MMOL/L (ref 3.5–5.1)
SODIUM SERPL-SCNC: 138 MMOL/L (ref 136–145)

## 2018-11-01 PROCEDURE — 74011000250 HC RX REV CODE- 250: Performed by: INTERNAL MEDICINE

## 2018-11-01 PROCEDURE — 74011250637 HC RX REV CODE- 250/637: Performed by: INTERNAL MEDICINE

## 2018-11-01 PROCEDURE — 74011000258 HC RX REV CODE- 258: Performed by: INTERNAL MEDICINE

## 2018-11-01 PROCEDURE — C1751 CATH, INF, PER/CENT/MIDLINE: HCPCS

## 2018-11-01 PROCEDURE — 65270000015 HC RM PRIVATE ONCOLOGY

## 2018-11-01 PROCEDURE — 76937 US GUIDE VASCULAR ACCESS: CPT

## 2018-11-01 PROCEDURE — 74011250636 HC RX REV CODE- 250/636: Performed by: INTERNAL MEDICINE

## 2018-11-01 PROCEDURE — 36415 COLL VENOUS BLD VENIPUNCTURE: CPT | Performed by: INTERNAL MEDICINE

## 2018-11-01 PROCEDURE — 77030018786 HC NDL GD F/USND BARD -B

## 2018-11-01 PROCEDURE — 80048 BASIC METABOLIC PNL TOTAL CA: CPT | Performed by: INTERNAL MEDICINE

## 2018-11-01 RX ORDER — SODIUM CHLORIDE 0.9 % (FLUSH) 0.9 %
10-40 SYRINGE (ML) INJECTION EVERY 8 HOURS
Status: DISCONTINUED | OUTPATIENT
Start: 2018-11-01 | End: 2018-11-07 | Stop reason: HOSPADM

## 2018-11-01 RX ORDER — DEXTROSE, SODIUM CHLORIDE, AND POTASSIUM CHLORIDE 5; .45; .15 G/100ML; G/100ML; G/100ML
25 INJECTION INTRAVENOUS CONTINUOUS
Status: DISCONTINUED | OUTPATIENT
Start: 2018-11-01 | End: 2018-11-07 | Stop reason: HOSPADM

## 2018-11-01 RX ADMIN — LORATADINE, PSEUDOEPHEDRINE SULFATE 1 TABLET: 5; 120 TABLET, FILM COATED, EXTENDED RELEASE ORAL at 08:21

## 2018-11-01 RX ADMIN — SODIUM CHLORIDE 5 MG: 9 INJECTION INTRAMUSCULAR; INTRAVENOUS; SUBCUTANEOUS at 16:35

## 2018-11-01 RX ADMIN — SODIUM CHLORIDE 10 ML: 9 INJECTION INTRAMUSCULAR; INTRAVENOUS; SUBCUTANEOUS at 20:38

## 2018-11-01 RX ADMIN — SODIUM CHLORIDE 5 MG: 9 INJECTION INTRAMUSCULAR; INTRAVENOUS; SUBCUTANEOUS at 08:22

## 2018-11-01 RX ADMIN — POTASSIUM CHLORIDE: 149 INJECTION, SOLUTION, CONCENTRATE INTRAVENOUS at 08:34

## 2018-11-01 RX ADMIN — Medication 10 ML: at 12:29

## 2018-11-01 RX ADMIN — CITALOPRAM HYDROBROMIDE 10 MG: 20 TABLET ORAL at 08:14

## 2018-11-01 RX ADMIN — HYDROMORPHONE HYDROCHLORIDE 1 MG: 1 INJECTION, SOLUTION INTRAMUSCULAR; INTRAVENOUS; SUBCUTANEOUS at 12:29

## 2018-11-01 RX ADMIN — PANTOPRAZOLE SODIUM 40 MG: 40 TABLET, DELAYED RELEASE ORAL at 16:35

## 2018-11-01 RX ADMIN — Medication 10 ML: at 03:54

## 2018-11-01 RX ADMIN — HYDROMORPHONE HYDROCHLORIDE 1 MG: 1 INJECTION, SOLUTION INTRAMUSCULAR; INTRAVENOUS; SUBCUTANEOUS at 20:37

## 2018-11-01 RX ADMIN — ENOXAPARIN SODIUM 40 MG: 100 INJECTION SUBCUTANEOUS at 08:15

## 2018-11-01 RX ADMIN — SODIUM CHLORIDE 5 MG: 9 INJECTION INTRAMUSCULAR; INTRAVENOUS; SUBCUTANEOUS at 03:55

## 2018-11-01 RX ADMIN — Medication 10 ML: at 08:24

## 2018-11-01 RX ADMIN — SODIUM CHLORIDE 5 MG: 9 INJECTION INTRAMUSCULAR; INTRAVENOUS; SUBCUTANEOUS at 20:37

## 2018-11-01 RX ADMIN — HYDROMORPHONE HYDROCHLORIDE 1 MG: 1 INJECTION, SOLUTION INTRAMUSCULAR; INTRAVENOUS; SUBCUTANEOUS at 16:35

## 2018-11-01 RX ADMIN — PANTOPRAZOLE SODIUM 40 MG: 40 TABLET, DELAYED RELEASE ORAL at 08:14

## 2018-11-01 RX ADMIN — LORATADINE, PSEUDOEPHEDRINE SULFATE 1 TABLET: 5; 120 TABLET, FILM COATED, EXTENDED RELEASE ORAL at 20:37

## 2018-11-01 RX ADMIN — SODIUM CHLORIDE 5 MG: 9 INJECTION INTRAMUSCULAR; INTRAVENOUS; SUBCUTANEOUS at 12:29

## 2018-11-01 RX ADMIN — FOLIC ACID 1 MG: 1 TABLET ORAL at 08:15

## 2018-11-01 RX ADMIN — HYDROMORPHONE HYDROCHLORIDE 1 MG: 1 INJECTION, SOLUTION INTRAMUSCULAR; INTRAVENOUS; SUBCUTANEOUS at 08:21

## 2018-11-01 RX ADMIN — DEXTROSE MONOHYDRATE, SODIUM CHLORIDE, AND POTASSIUM CHLORIDE 100 ML/HR: 50; 4.5; 1.49 INJECTION, SOLUTION INTRAVENOUS at 20:37

## 2018-11-01 RX ADMIN — Medication 10 ML: at 16:36

## 2018-11-01 RX ADMIN — HYDROMORPHONE HYDROCHLORIDE 1 MG: 1 INJECTION, SOLUTION INTRAMUSCULAR; INTRAVENOUS; SUBCUTANEOUS at 03:55

## 2018-11-01 NOTE — PROGRESS NOTES
Bedside and Verbal shift change report given to Gosia Wakefield (oncoming nurse) by Ranjana Augustin (offgoing nurse). Report included the following information SBAR, Kardex, ED Summary, MAR, Recent Results and Cardiac Rhythm normal sinus.

## 2018-11-01 NOTE — PROGRESS NOTES
Problem: Pain Goal: *PALLIATIVE CARE:  Alleviation of Pain Outcome: Progressing Towards Goal 
Inform nursing staff if pain is present

## 2018-11-01 NOTE — PROGRESS NOTES
MIDLINE Insertion Procedure  Note:  
 
Procedure explained to  pt  along with risks and benefits. Procedure teaching completed. Pre procedure assessment done. Maximum sterile barrier precautions observed throughout procedure. Lidocaine 1 % 3.0ml sc injected to site prior to access the vein . Cannulated  brachial vein using ultrasound guidance. Inserted  4   Fr. Single   lumen midline to Right arm. Blood return verified and  flushed with 20ml normal saline  to  port. Sterile dressing applied with biopatch, statLock and occlusive dressing as per protocol. Curos cap applied to port. Patient tolerated procedure well with minimal blood loss. Reason for access : Reliable IV Access / limited vascular access Complications related to insertion : None This midline to be removed on or before  11/30/2018 See nursing message  for midline reminders. Inserted by : Jackson Foote, [address] [place] [address] Nurse Assisted by : Aura Charles RN PICC Nurse Total Length : 12cm External Length :  0 cm Arm circumference :  29 cm Catheter occupies  14% of vein. Type of Midline:  Bard Power Midline Ref#:  [id number] Lot#:  [id number] Expiration Date: 01/31/2020 Jackson Foote RN, [facility]  Vascular Access Team

## 2018-11-01 NOTE — PROGRESS NOTES
General Daily Progress Note Admit Date: 10/28/2018 Subjective:  
 
Patient continues to complain of pain. Current Facility-Administered Medications Medication Dose Route Frequency  loratadine-pseudoephedrine (CLARITIN-D 12-hour) 5-120 mg per tablet 1 Tab  1 Tab Oral Q12H  
 dextrose 5 % - 0.45% NaCl 1,000 mL with potassium chloride 20 mEq infusion   IntraVENous CONTINUOUS  
 pantoprazole (PROTONIX) tablet 40 mg  40 mg Oral ACB&D  
 acetaminophen (TYLENOL) tablet 650 mg  650 mg Oral H0I PRN  
 folic acid (FOLVITE) tablet 1 mg  1 mg Oral DAILY  citalopram (CELEXA) tablet 10 mg  10 mg Oral DAILY  cyclobenzaprine (FLEXERIL) tablet 10 mg  10 mg Oral TID PRN  
 sodium chloride (NS) flush 5-10 mL  5-10 mL IntraVENous Q8H  
 sodium chloride (NS) flush 5-10 mL  5-10 mL IntraVENous PRN  
 enoxaparin (LOVENOX) injection 40 mg  40 mg SubCUTAneous Q24H  
 ondansetron (ZOFRAN) injection 4 mg  4 mg IntraVENous Q6H PRN  
 bisacodyl (DULCOLAX) tablet 5 mg  5 mg Oral DAILY PRN  prochlorperazine (COMPAZINE) with saline injection 5 mg  5 mg IntraVENous Q4H PRN  
 saline peripheral flush soln 10 mL  10 mL InterCATHeter PRN  
 HYDROmorphone (PF) (DILAUDID) injection 1 mg  1 mg IntraVENous Q4H PRN  
 diphenhydrAMINE (BENADRYL) injection 25 mg  25 mg IntraVENous Q6H PRN Review of Systems A comprehensive review of systems was negative. Objective:  
 
Patient Vitals for the past 24 hrs: 
 BP Temp Pulse Resp SpO2  
10/31/18 1822 118/60 99.2 °F (37.3 °C) 94 19 100 % 10/31/18 1447 134/69 98.7 °F (37.1 °C) 89 20 100 % 10/31/18 0718 130/84 98.8 °F (37.1 °C) 76 20 100 % 10/31/18 0324 117/73 99 °F (37.2 °C) 74 20 100 % 10/30/18 2343 98/62 98.9 °F (37.2 °C) 75 20 99 % No intake/output data recorded. 10/30 0701 - 10/31 1900 In: 3676.7 [P.O.:1040; I.V.:2636.7] Out: 1800 [Urine:1800] Physical Exam:  
Visit Vitals /60 (BP 1 Location: Left arm, BP Patient Position: At rest) Pulse 94 Temp 99.2 °F (37.3 °C) Resp 19 Wt 170 lb 10.2 oz (77.4 kg) SpO2 100% Breastfeeding? No  
BMI 23.80 kg/m² General appearance: alert, cooperative, no distress, appears stated age Neck: supple, symmetrical, trachea midline, no adenopathy, thyroid: not enlarged, symmetric, no tenderness/mass/nodules, no carotid bruit and no JVD Lungs: clear to auscultation bilaterally Heart: regular rate and rhythm, S1, S2 normal, no murmur, click, rub or gallop Abdomen: soft, non-tender. Bowel sounds normal. No masses,  no organomegaly Extremities: extremities normal, atraumatic, no cyanosis or edema Data Review Recent Results (from the past 24 hour(s)) METABOLIC PANEL, BASIC Collection Time: 10/31/18  2:52 AM  
Result Value Ref Range Sodium 139 136 - 145 mmol/L Potassium 3.9 3.5 - 5.1 mmol/L Chloride 112 (H) 97 - 108 mmol/L  
 CO2 19 (L) 21 - 32 mmol/L Anion gap 8 5 - 15 mmol/L Glucose 91 65 - 100 mg/dL BUN 3 (L) 6 - 20 MG/DL Creatinine 0.77 0.55 - 1.02 MG/DL  
 BUN/Creatinine ratio 4 (L) 12 - 20 GFR est AA >60 >60 ml/min/1.73m2 GFR est non-AA >60 >60 ml/min/1.73m2 Calcium 8.4 (L) 8.5 - 10.1 MG/DL  
CBC WITH MANUAL DIFF Collection Time: 10/31/18  7:32 AM  
Result Value Ref Range WBC 8.7 3.6 - 11.0 K/uL  
 RBC 2.36 (L) 3.80 - 5.20 M/uL HGB 7.4 (L) 11.5 - 16.0 g/dL HCT 22.2 (L) 35.0 - 47.0 % MCV 94.1 80.0 - 99.0 FL  
 MCH 31.4 26.0 - 34.0 PG  
 MCHC 33.3 30.0 - 36.5 g/dL  
 RDW 15.1 (H) 11.5 - 14.5 % PLATELET 307 276 - 358 K/uL MPV 11.4 8.9 - 12.9 FL  
 NRBC 0.6 (H) 0  WBC ABSOLUTE NRBC 0.05 (H) 0.00 - 0.01 K/uL NEUTROPHILS 34 32 - 75 % BAND NEUTROPHILS 0 0 - 6 % LYMPHOCYTES 45 12 - 49 % MONOCYTES 18 (H) 5 - 13 % EOSINOPHILS 2 0 - 7 % BASOPHILS 1 0 - 1 % METAMYELOCYTES 0 0 % MYELOCYTES 0 0 % PROMYELOCYTES 0 0 % BLASTS 0 0 % OTHER CELL 0 0 IMMATURE GRANULOCYTES 0 %  
 ABS. NEUTROPHILS 3.0 1.8 - 8.0 K/UL ABS. LYMPHOCYTES 3.8 (H) 0.8 - 3.5 K/UL  
 ABS. MONOCYTES 1.6 (H) 0.0 - 1.0 K/UL  
 ABS. EOSINOPHILS 0.2 0.0 - 0.4 K/UL  
 ABS. BASOPHILS 0.1 0.0 - 0.1 K/UL  
 ABS. IMM. GRANS. 0.0 K/UL  
 RBC COMMENTS TARGET CELLS 
PRESENT 
    
 RBC COMMENTS ANISOCYTOSIS 1+ 
    
 RBC COMMENTS POIKILOCYTOSIS 1+ 
    
 RBC COMMENTS POLYCHROMASIA 1+ Assessment:  
 
Active Problems: 
  [facility] (9/8/2018) Plan: 1. Continue treatment of painful sickle cell crisis. 2.  Mild URI------ treated with Claritin-D. 3.  Continue hydration.

## 2018-11-01 NOTE — PROGRESS NOTES
0700 - Bedside and Verbal shift change report given to jaimee cr (oncoming nurse) by Akua Bruno (offgoing nurse). Report included the following information SBAR, Kardex, Intake/Output, MAR and Recent Results. 1900 - Bedside and Verbal shift change report given to france (oncoming nurse) by Yamilex Rasheed (offgoing nurse). Report included the following information SBAR, Kardex, Intake/Output, MAR, Recent Results and Cardiac Rhythm NSR.

## 2018-11-02 LAB
ANION GAP SERPL CALC-SCNC: 6 MMOL/L (ref 5–15)
BASOPHILS # BLD: 0 K/UL (ref 0–0.1)
BASOPHILS NFR BLD: 0 % (ref 0–1)
BLASTS NFR BLD MANUAL: 0 %
BUN SERPL-MCNC: 3 MG/DL (ref 6–20)
BUN/CREAT SERPL: 5 (ref 12–20)
CALCIUM SERPL-MCNC: 8.2 MG/DL (ref 8.5–10.1)
CHLORIDE SERPL-SCNC: 109 MMOL/L (ref 97–108)
CO2 SERPL-SCNC: 25 MMOL/L (ref 21–32)
CREAT SERPL-MCNC: 0.64 MG/DL (ref 0.55–1.02)
DIFFERENTIAL METHOD BLD: ABNORMAL
EOSINOPHIL # BLD: 0.4 K/UL (ref 0–0.4)
EOSINOPHIL NFR BLD: 5 % (ref 0–7)
ERYTHROCYTE [DISTWIDTH] IN BLOOD BY AUTOMATED COUNT: 14.9 % (ref 11.5–14.5)
GLUCOSE SERPL-MCNC: 86 MG/DL (ref 65–100)
HCT VFR BLD AUTO: 21.2 % (ref 35–47)
HGB BLD-MCNC: 7.2 G/DL (ref 11.5–16)
IMM GRANULOCYTES # BLD: 0 K/UL
IMM GRANULOCYTES NFR BLD AUTO: 0 %
LYMPHOCYTES # BLD: 3.5 K/UL (ref 0.8–3.5)
LYMPHOCYTES NFR BLD: 46 % (ref 12–49)
MCH RBC QN AUTO: 31 PG (ref 26–34)
MCHC RBC AUTO-ENTMCNC: 34 G/DL (ref 30–36.5)
MCV RBC AUTO: 91.4 FL (ref 80–99)
METAMYELOCYTES NFR BLD MANUAL: 0 %
MONOCYTES # BLD: 0.7 K/UL (ref 0–1)
MONOCYTES NFR BLD: 9 % (ref 5–13)
MYELOCYTES NFR BLD MANUAL: 0 %
NEUTS BAND NFR BLD MANUAL: 0 % (ref 0–6)
NEUTS SEG # BLD: 3 K/UL (ref 1.8–8)
NEUTS SEG NFR BLD: 40 % (ref 32–75)
NRBC # BLD: 0.04 K/UL (ref 0–0.01)
NRBC BLD-RTO: 0.5 PER 100 WBC
OTHER CELLS NFR BLD MANUAL: 0 %
PLATELET # BLD AUTO: 242 K/UL (ref 150–400)
PMV BLD AUTO: 11 FL (ref 8.9–12.9)
POTASSIUM SERPL-SCNC: 3.8 MMOL/L (ref 3.5–5.1)
PROMYELOCYTES NFR BLD MANUAL: 0 %
RBC # BLD AUTO: 2.32 M/UL (ref 3.8–5.2)
RBC MORPH BLD: ABNORMAL
SODIUM SERPL-SCNC: 140 MMOL/L (ref 136–145)
WBC # BLD AUTO: 7.6 K/UL (ref 3.6–11)

## 2018-11-02 PROCEDURE — 74011250637 HC RX REV CODE- 250/637: Performed by: INTERNAL MEDICINE

## 2018-11-02 PROCEDURE — 85027 COMPLETE CBC AUTOMATED: CPT | Performed by: INTERNAL MEDICINE

## 2018-11-02 PROCEDURE — 36415 COLL VENOUS BLD VENIPUNCTURE: CPT | Performed by: INTERNAL MEDICINE

## 2018-11-02 PROCEDURE — 74011250636 HC RX REV CODE- 250/636: Performed by: INTERNAL MEDICINE

## 2018-11-02 PROCEDURE — 80048 BASIC METABOLIC PNL TOTAL CA: CPT | Performed by: INTERNAL MEDICINE

## 2018-11-02 PROCEDURE — 65270000015 HC RM PRIVATE ONCOLOGY

## 2018-11-02 PROCEDURE — 74011000250 HC RX REV CODE- 250: Performed by: INTERNAL MEDICINE

## 2018-11-02 RX ADMIN — PANTOPRAZOLE SODIUM 40 MG: 40 TABLET, DELAYED RELEASE ORAL at 15:57

## 2018-11-02 RX ADMIN — FOLIC ACID 1 MG: 1 TABLET ORAL at 09:00

## 2018-11-02 RX ADMIN — HYDROMORPHONE HYDROCHLORIDE 1 MG: 1 INJECTION, SOLUTION INTRAMUSCULAR; INTRAVENOUS; SUBCUTANEOUS at 20:56

## 2018-11-02 RX ADMIN — SODIUM CHLORIDE 5 MG: 9 INJECTION INTRAMUSCULAR; INTRAVENOUS; SUBCUTANEOUS at 09:00

## 2018-11-02 RX ADMIN — SODIUM CHLORIDE 5 MG: 9 INJECTION INTRAMUSCULAR; INTRAVENOUS; SUBCUTANEOUS at 13:05

## 2018-11-02 RX ADMIN — HYDROMORPHONE HYDROCHLORIDE 1 MG: 1 INJECTION, SOLUTION INTRAMUSCULAR; INTRAVENOUS; SUBCUTANEOUS at 13:06

## 2018-11-02 RX ADMIN — ACETAMINOPHEN 650 MG: 325 TABLET ORAL at 15:57

## 2018-11-02 RX ADMIN — LORATADINE, PSEUDOEPHEDRINE SULFATE 1 TABLET: 5; 120 TABLET, FILM COATED, EXTENDED RELEASE ORAL at 13:39

## 2018-11-02 RX ADMIN — SODIUM CHLORIDE 5 MG: 9 INJECTION INTRAMUSCULAR; INTRAVENOUS; SUBCUTANEOUS at 01:05

## 2018-11-02 RX ADMIN — PANTOPRAZOLE SODIUM 40 MG: 40 TABLET, DELAYED RELEASE ORAL at 07:44

## 2018-11-02 RX ADMIN — SODIUM CHLORIDE 5 MG: 9 INJECTION INTRAMUSCULAR; INTRAVENOUS; SUBCUTANEOUS at 17:18

## 2018-11-02 RX ADMIN — LORATADINE, PSEUDOEPHEDRINE SULFATE 1 TABLET: 5; 120 TABLET, FILM COATED, EXTENDED RELEASE ORAL at 21:24

## 2018-11-02 RX ADMIN — SODIUM CHLORIDE 10 ML: 9 INJECTION INTRAMUSCULAR; INTRAVENOUS; SUBCUTANEOUS at 21:24

## 2018-11-02 RX ADMIN — Medication 10 ML: at 16:05

## 2018-11-02 RX ADMIN — HYDROMORPHONE HYDROCHLORIDE 1 MG: 1 INJECTION, SOLUTION INTRAMUSCULAR; INTRAVENOUS; SUBCUTANEOUS at 05:41

## 2018-11-02 RX ADMIN — HYDROMORPHONE HYDROCHLORIDE 1 MG: 1 INJECTION, SOLUTION INTRAMUSCULAR; INTRAVENOUS; SUBCUTANEOUS at 17:19

## 2018-11-02 RX ADMIN — SODIUM CHLORIDE 10 ML: 9 INJECTION INTRAMUSCULAR; INTRAVENOUS; SUBCUTANEOUS at 17:19

## 2018-11-02 RX ADMIN — HYDROMORPHONE HYDROCHLORIDE 1 MG: 1 INJECTION, SOLUTION INTRAMUSCULAR; INTRAVENOUS; SUBCUTANEOUS at 01:05

## 2018-11-02 RX ADMIN — SODIUM CHLORIDE 5 MG: 9 INJECTION INTRAMUSCULAR; INTRAVENOUS; SUBCUTANEOUS at 20:55

## 2018-11-02 RX ADMIN — HYDROMORPHONE HYDROCHLORIDE 1 MG: 1 INJECTION, SOLUTION INTRAMUSCULAR; INTRAVENOUS; SUBCUTANEOUS at 09:00

## 2018-11-02 RX ADMIN — SODIUM CHLORIDE 5 MG: 9 INJECTION INTRAMUSCULAR; INTRAVENOUS; SUBCUTANEOUS at 05:41

## 2018-11-02 RX ADMIN — CITALOPRAM HYDROBROMIDE 10 MG: 20 TABLET ORAL at 09:00

## 2018-11-02 RX ADMIN — DEXTROSE MONOHYDRATE, SODIUM CHLORIDE, AND POTASSIUM CHLORIDE 100 ML/HR: 50; 4.5; 1.49 INJECTION, SOLUTION INTRAVENOUS at 05:37

## 2018-11-02 RX ADMIN — DEXTROSE MONOHYDRATE, SODIUM CHLORIDE, AND POTASSIUM CHLORIDE 75 ML/HR: 50; 4.5; 1.49 INJECTION, SOLUTION INTRAVENOUS at 17:22

## 2018-11-02 RX ADMIN — SODIUM CHLORIDE 10 ML: 9 INJECTION INTRAMUSCULAR; INTRAVENOUS; SUBCUTANEOUS at 05:37

## 2018-11-02 RX ADMIN — ENOXAPARIN SODIUM 40 MG: 100 INJECTION SUBCUTANEOUS at 09:00

## 2018-11-02 NOTE — PROGRESS NOTES
Oncology Nursing Communication Tool 11:21 PM 
11/1/2018 Bedside shift change report given to Gonzalo Weiss RN (incoming nurse) by Juanito Harding RN (outgoing nurse) on [facility]. Report included the following information SBAR, Kardex, MAR and Accordion. Shift Summary: Medicated for pain x1, up ad erum Issues for physician to address: Oncology Shift Note Admission Date 10/28/2018 Admission Diagnosis Sickle cell pain crisis ([facility] Code Status Full Code Consults None Cardiac Monitoring [x] Yes [] No  
  
Purposeful Hourly Rounding [x] Yes   
Mukund Score Total Score: 1 Mukund score 3 or > [] Bed Alarm [] Avasys [] 1:1 sitter [] Patient refused (Place signed refusal form in chart) Pain Managed [x] Yes [] No  
 Key Pain Meds HYDROcodone-acetaminophen (NORCO)  mg tablet  (Taking) Take 1 Tab by mouth every six (6) hours as needed. Max Daily Amount: 4 Tabs. fentaNYL (DURAGESIC) 75 mcg/hr  (Taking) 1 Patch by TransDERmal route every seventy-two (72) hours. Max Daily Amount: 1 Patch. Influenza Vaccine Received Flu Vaccine for Current Season (usually Sept-March): No  
 Patient/Guardian Refused (Notify MD): Yes Oxygen needs? [x] Room air Oxygen @  []1L    []2L    []3L   []4L    []5L   []6L Use home O2? [] Yes [] No 
Perform O2 challenge test using  smartphrase (.oxygenchallenge) Last bowel movement Last Bowel Movement Date: 11/01/18 
bowel movement Urinary Catheter LDAs Readmission Risk Assessment Tool Score Medium Risk 15 Total Score 3 Has Seen PCP in Last 6 Months (Yes=3, No=0)  
 4 IP Visits Last 12 Months (1-3=4, 4=9, >4=11) 5 Pt. Coverage (Medicare=5 , Medicaid, or Self-Pay=4) 2 Charlson Comorbidity Score (Age + Comorbid Conditions) Criteria that do not apply:  
 . Living with Significant Other. Assisted Living. LTAC. SNF.  or  
 Rehab Patient Length of Stay (>5 days = 3) Expected Length of Stay 2d 16h Actual Length of Stay 4 Skip Orozco RN

## 2018-11-02 NOTE — PROGRESS NOTES
Bedside and Verbal shift change report given to [address] (oncoming nurse) by MADELYN Daugherty RN (offgoing nurse). Report given with SBAR, Kardex, Intake/Output, MAR and Recent Results.

## 2018-11-02 NOTE — PROGRESS NOTES
Oncology Nursing Communication Tool 7:27 PM 
11/2/2018 Bedside shift change report given to Javed Chirinos (incoming nurse) by Kenneth Perkins (outgoing nurse) on [facility]. Report included the following information Shift Summary: Prns q4, headache relieved by tylenol Issues for physician to address: pain managemnt Oncology Shift Note Admission Date 10/28/2018 Admission Diagnosis Sickle cell pain crisis ([facility] Code Status Full Code Consults None Cardiac Monitoring [] Yes [] No  
  
Purposeful Hourly Rounding [] Yes   
Mukund Score Total Score: 1 Mukund score 3 or > [] Bed Alarm [] Avasys [] 1:1 sitter [] Patient refused (Place signed refusal form in chart) Pain Managed [] Yes [] No  
 Key Pain Meds HYDROcodone-acetaminophen (NORCO)  mg tablet  (Taking) Take 1 Tab by mouth every six (6) hours as needed. Max Daily Amount: 4 Tabs. fentaNYL (DURAGESIC) 75 mcg/hr  (Taking) 1 Patch by TransDERmal route every seventy-two (72) hours. Max Daily Amount: 1 Patch. Influenza Vaccine Received Flu Vaccine for Current Season (usually Sept-March): No  
 Patient/Guardian Refused (Notify MD): Yes Oxygen needs? [] Room air Oxygen @  []1L    []2L    []3L   []4L    []5L   []6L Use home O2? [] Yes [] No 
Perform O2 challenge test using  smartphrase (.oxygenchallenge) Last bowel movement Last Bowel Movement Date: 11/01/18 
bowel movement Urinary Catheter LDAs Readmission Risk Assessment Tool Score Medium Risk 15 Total Score 3 Has Seen PCP in Last 6 Months (Yes=3, No=0)  
 4 IP Visits Last 12 Months (1-3=4, 4=9, >4=11) 5 Pt. Coverage (Medicare=5 , Medicaid, or Self-Pay=4) 2 Charlson Comorbidity Score (Age + Comorbid Conditions) Criteria that do not apply:  
 . Living with Significant Other. Assisted Living. LTAC. SNF. or  
Rehab Patient Length of Stay (>5 days = 3) Expected Length of Stay 2d 16h Actual Length of Stay 5 Allie Cook

## 2018-11-02 NOTE — PROGRESS NOTES
General Daily Progress Note Admit Date: 10/28/2018 Subjective:  
 
Patient continues to complain of pain although improving. Current Facility-Administered Medications Medication Dose Route Frequency  sodium chloride (NS) flush 10-40 mL  10-40 mL IntraVENous Q8H  
 dextrose 5% - 0.45% NaCl with KCl 20 mEq/L infusion  100 mL/hr IntraVENous CONTINUOUS  
 loratadine-pseudoephedrine (CLARITIN-D 12-hour) 5-120 mg per tablet 1 Tab  1 Tab Oral Q12H  pantoprazole (PROTONIX) tablet 40 mg  40 mg Oral ACB&D  
 acetaminophen (TYLENOL) tablet 650 mg  650 mg Oral G7J PRN  
 folic acid (FOLVITE) tablet 1 mg  1 mg Oral DAILY  citalopram (CELEXA) tablet 10 mg  10 mg Oral DAILY  cyclobenzaprine (FLEXERIL) tablet 10 mg  10 mg Oral TID PRN  
 sodium chloride (NS) flush 5-10 mL  5-10 mL IntraVENous PRN  
 enoxaparin (LOVENOX) injection 40 mg  40 mg SubCUTAneous Q24H  
 ondansetron (ZOFRAN) injection 4 mg  4 mg IntraVENous Q6H PRN  
 bisacodyl (DULCOLAX) tablet 5 mg  5 mg Oral DAILY PRN  prochlorperazine (COMPAZINE) with saline injection 5 mg  5 mg IntraVENous Q4H PRN  
 HYDROmorphone (PF) (DILAUDID) injection 1 mg  1 mg IntraVENous Q4H PRN  
 diphenhydrAMINE (BENADRYL) injection 25 mg  25 mg IntraVENous Q6H PRN Review of Systems A comprehensive review of systems was negative. Objective:  
 
Patient Vitals for the past 24 hrs: 
 BP Temp Pulse Resp SpO2  
11/02/18 0732 129/73 99 °F (37.2 °C) 87 16 100 % 11/02/18 0517 145/89 99.3 °F (37.4 °C) 90 15 100 % 11/02/18 0057 103/70 99.4 °F (37.4 °C) 92 16 100 % 11/01/18 2001 (!) 157/91 99.6 °F (37.6 °C) 95 16 100 % 11/01/18 1615 143/85 98.3 °F (36.8 °C) 94 20 100 % 11/01/18 1125 115/67 98.9 °F (37.2 °C) (!) 103 18 94 % No intake/output data recorded. 10/31 1901 - 11/02 0700 In: 1553.3 [P.O.:120; I.V.:1433.3] Out: - Physical Exam:  
Visit Vitals /73 (BP 1 Location: Left arm, BP Patient Position: Sitting) Pulse 87 Temp 99 °F (37.2 °C) Resp 16 Wt 170 lb 10.2 oz (77.4 kg) SpO2 100% Breastfeeding? No  
BMI 23.80 kg/m² General appearance: alert, cooperative, no distress, appears stated age Neck: supple, symmetrical, trachea midline, no adenopathy, thyroid: not enlarged, symmetric, no tenderness/mass/nodules, no carotid bruit and no JVD Lungs: clear to auscultation bilaterally Heart: regular rate and rhythm, S1, S2 normal, no murmur, click, rub or gallop Abdomen: soft, non-tender. Bowel sounds normal. No masses,  no organomegaly Extremities: extremities normal, atraumatic, no cyanosis or edema Data Review Recent Results (from the past 24 hour(s)) CBC WITH MANUAL DIFF Collection Time: 11/02/18  5:41 AM  
Result Value Ref Range WBC 7.6 3.6 - 11.0 K/uL  
 RBC 2.32 (L) 3.80 - 5.20 M/uL HGB 7.2 (L) 11.5 - 16.0 g/dL HCT 21.2 (L) 35.0 - 47.0 % MCV 91.4 80.0 - 99.0 FL  
 MCH 31.0 26.0 - 34.0 PG  
 MCHC 34.0 30.0 - 36.5 g/dL  
 RDW 14.9 (H) 11.5 - 14.5 % PLATELET 736 854 - 013 K/uL MPV 11.0 8.9 - 12.9 FL  
 NRBC 0.5 (H) 0  WBC ABSOLUTE NRBC 0.04 (H) 0.00 - 0.01 K/uL NEUTROPHILS 40 32 - 75 % BAND NEUTROPHILS 0 0 - 6 % LYMPHOCYTES 46 12 - 49 % MONOCYTES 9 5 - 13 % EOSINOPHILS 5 0 - 7 % BASOPHILS 0 0 - 1 % METAMYELOCYTES 0 0 % MYELOCYTES 0 0 % PROMYELOCYTES 0 0 % BLASTS 0 0 % OTHER CELL 0 0 IMMATURE GRANULOCYTES 0 %  
 ABS. NEUTROPHILS 3.0 1.8 - 8.0 K/UL  
 ABS. LYMPHOCYTES 3.5 0.8 - 3.5 K/UL  
 ABS. MONOCYTES 0.7 0.0 - 1.0 K/UL  
 ABS. EOSINOPHILS 0.4 0.0 - 0.4 K/UL  
 ABS. BASOPHILS 0.0 0.0 - 0.1 K/UL  
 ABS. IMM. GRANS. 0.0 K/UL  
 DF MANUAL    
 RBC COMMENTS ANISOCYTOSIS 1+ 
    
 RBC COMMENTS HYPOCHROMIA 1+ 
    
 RBC COMMENTS TARGET CELLS 
PRESENT 
    
METABOLIC PANEL, BASIC Collection Time: 11/02/18  5:41 AM  
Result Value Ref Range Sodium 140 136 - 145 mmol/L Potassium 3.8 3.5 - 5.1 mmol/L  Chloride 109 (H) 97 - 108 mmol/L  
 CO2 25 21 - 32 mmol/L Anion gap 6 5 - 15 mmol/L Glucose 86 65 - 100 mg/dL BUN 3 (L) 6 - 20 MG/DL Creatinine 0.64 0.55 - 1.02 MG/DL  
 BUN/Creatinine ratio 5 (L) 12 - 20 GFR est AA >60 >60 ml/min/1.73m2 GFR est non-AA >60 >60 ml/min/1.73m2 Calcium 8.2 (L) 8.5 - 10.1 MG/DL Assessment:  
 
Active Problems: 
  Sickle cell pain crisis ([facility] (9/8/2018) Plan: 1. Continue treatment of painful crises. 2.  We will mobilize. 3.  Continue hydration.

## 2018-11-02 NOTE — PROGRESS NOTES
General Daily Progress Note Admit Date: 10/28/2018 Subjective:  
 
Patient has no complaint . Current Facility-Administered Medications Medication Dose Route Frequency  sodium chloride (NS) flush 10-40 mL  10-40 mL IntraVENous Q8H  
 dextrose 5% - 0.45% NaCl with KCl 20 mEq/L infusion  100 mL/hr IntraVENous CONTINUOUS  
 loratadine-pseudoephedrine (CLARITIN-D 12-hour) 5-120 mg per tablet 1 Tab  1 Tab Oral Q12H  pantoprazole (PROTONIX) tablet 40 mg  40 mg Oral ACB&D  
 acetaminophen (TYLENOL) tablet 650 mg  650 mg Oral Y0J PRN  
 folic acid (FOLVITE) tablet 1 mg  1 mg Oral DAILY  citalopram (CELEXA) tablet 10 mg  10 mg Oral DAILY  cyclobenzaprine (FLEXERIL) tablet 10 mg  10 mg Oral TID PRN  
 sodium chloride (NS) flush 5-10 mL  5-10 mL IntraVENous PRN  
 enoxaparin (LOVENOX) injection 40 mg  40 mg SubCUTAneous Q24H  
 ondansetron (ZOFRAN) injection 4 mg  4 mg IntraVENous Q6H PRN  
 bisacodyl (DULCOLAX) tablet 5 mg  5 mg Oral DAILY PRN  prochlorperazine (COMPAZINE) with saline injection 5 mg  5 mg IntraVENous Q4H PRN  
 HYDROmorphone (PF) (DILAUDID) injection 1 mg  1 mg IntraVENous Q4H PRN  
 diphenhydrAMINE (BENADRYL) injection 25 mg  25 mg IntraVENous Q6H PRN Review of Systems A comprehensive review of systems was negative. Objective:  
 
Patient Vitals for the past 24 hrs: 
 BP Temp Pulse Resp SpO2  
11/01/18 2001 (!) 157/91 99.6 °F (37.6 °C) 95 16 100 % 11/01/18 1615 143/85 98.3 °F (36.8 °C) 94 20 100 % 11/01/18 1125 115/67 98.9 °F (37.2 °C) (!) 103 18 94 % 11/01/18 0759     99 % 11/01/18 0740 132/81 99.3 °F (37.4 °C) 90 16 99 % 11/01/18 0313 134/78 99 °F (37.2 °C) 84 18 100 % 10/31/18 2300 127/76 98.9 °F (37.2 °C) 90 18 100 % No intake/output data recorded. 10/31 0701 - 11/01 1900 In: 3171.7 [P.O.:1160; I.V.:2011.7] Out: 1800 [Urine:1800] Physical Exam:  
Visit Vitals BP (!) 157/91 Pulse 95 Temp 99.6 °F (37.6 °C) Resp 16  
 Wt 170 lb 10.2 oz (77.4 kg) SpO2 100% Breastfeeding? No  
BMI 23.80 kg/m² General appearance: alert, cooperative, no distress, appears stated age Neck: supple, symmetrical, trachea midline, no adenopathy, thyroid: not enlarged, symmetric, no tenderness/mass/nodules, no carotid bruit and no JVD Lungs: clear to auscultation bilaterally Heart: regular rate and rhythm, S1, S2 normal, no murmur, click, rub or gallop Abdomen: soft, non-tender. Bowel sounds normal. No masses,  no organomegaly Extremities: extremities normal, atraumatic, no cyanosis or edema Data Review Recent Results (from the past 24 hour(s)) METABOLIC PANEL, BASIC Collection Time: 11/01/18  3:56 AM  
Result Value Ref Range Sodium 138 136 - 145 mmol/L Potassium 3.7 3.5 - 5.1 mmol/L Chloride 110 (H) 97 - 108 mmol/L  
 CO2 21 21 - 32 mmol/L Anion gap 7 5 - 15 mmol/L Glucose 88 65 - 100 mg/dL BUN 3 (L) 6 - 20 MG/DL Creatinine 0.63 0.55 - 1.02 MG/DL  
 BUN/Creatinine ratio 5 (L) 12 - 20 GFR est AA >60 >60 ml/min/1.73m2 GFR est non-AA >60 >60 ml/min/1.73m2 Calcium 7.7 (L) 8.5 - 10.1 MG/DL Assessment:  
 
Active Problems: 
  Sickle cell pain crisis ([address] (9/8/2018) Plan: 1. Continue treatment for painful crises. As is often times the case patient has developed low-grade fever with no evidence of infection. 2.  Significant rhinitis currently treated with Claritin-D 12. 
3.  Continue BP control.

## 2018-11-02 NOTE — PROGRESS NOTES
Initial Nutrition Assessment: 
 
INTERVENTIONS/RECOMMENDATIONS:  
· Continue current diet ASSESSMENT:  
Chart reviewed, medically note for Sickle cell pain crisis and PMH shown below. Assessing pt due to LOS. Pt reports fair/good appetite and consuming at least 50% of meals. She denies weight loss PTA however weight history shows 30 lbs weight loss x 1 month, unsure of accuracy. Noted that when weight taken in early October, pt had lower extremity fluid accumulation. Past Medical History:  
Diagnosis Date  Anemia SC hemoglobinopathy  Chronic pain  Depression  Hypertension  Liver disease   
 hepatitis C; pt reports \"cured\"  Sickle cell disease ([address] Diet Order: Regular 
% Eaten:   
Patient Vitals for the past 72 hrs: 
 % Diet Eaten 10/31/18 1340 100 % 10/31/18 0845 25 % Pertinent Medications: [x]Reviewed: folic acid, PPI, compazine Pertinent Labs: [x]Reviewed: D5 1/2 NS Food Allergies: [x]NKFA  []Other Last BM: 11/1 Edema:        []RUE   []LUE   []RLE   []LLE Pressure Injury:      [] Stage I   [] Stage II   [] Stage III   [] Stage IV Wt Readings from Last 30 Encounters:  
10/29/18 77.4 kg (170 lb 10.2 oz)  
10/02/18 91.5 kg (201 lb 12.8 oz) 09/24/18 86.3 kg (190 lb 3.2 oz) 09/07/18 83.9 kg (185 lb)  
07/22/18 85.7 kg (188 lb 15 oz) 07/09/18 86.6 kg (191 lb)  
06/12/18 88.7 kg (195 lb 8 oz) 04/09/18 86 kg (189 lb 8 oz) 03/13/18 84.9 kg (187 lb 3.2 oz) 01/29/18 87.2 kg (192 lb 4.8 oz) 01/05/18 86.4 kg (190 lb 7.6 oz) 01/04/18 86 kg (189 lb 9.5 oz)  
11/28/17 87.7 kg (193 lb 4.8 oz)  
11/14/17 89.9 kg (198 lb 4.8 oz) 10/31/17 87.7 kg (193 lb 6.4 oz) 07/31/17 88 kg (194 lb)  
06/13/17 87.6 kg (193 lb 1.6 oz) 05/22/17 86.1 kg (189 lb 13.1 oz) 04/27/17 90.6 kg (199 lb 11.2 oz) 04/06/17 91.9 kg (202 lb 9.6 oz) 03/10/17 89.9 kg (198 lb 3.2 oz) 12/09/16 92.1 kg (203 lb 1.6 oz)  
11/15/16 94.6 kg (208 lb 9.6 oz) 10/21/16 89.4 kg (197 lb)  
07/21/16 92.1 kg (203 lb) 05/17/16 91.2 kg (201 lb) 04/21/16 92.1 kg (203 lb) 01/19/16 92.1 kg (203 lb) 10/19/15 92.3 kg (203 lb 8 oz) 10/05/15 95.2 kg (209 lb 12.8 oz) Anthropometrics:  
Height:   Weight: 77.4 kg (170 lb 10.2 oz) IBW (%IBW):   ( ) UBW (%UBW):   (  %) Last Weight Metrics: 
Weight Loss Metrics 10/29/2018 10/2/2018 9/24/2018 9/8/2018 9/7/2018 7/22/2018 7/9/2018 Today's Wt 170 lb 10.2 oz 201 lb 12.8 oz 190 lb 3.2 oz - 185 lb 188 lb 15 oz 191 lb BMI 23.8 kg/m2 28.15 kg/m2 26.53 kg/m2 25.8 kg/m2 - 26.35 kg/m2 27.41 kg/m2 BMI: Body mass index is 23.8 kg/m². This BMI is indicative of: 
 []Underweight    [x]Normal    []Overweight    [] Obesity   [] Extreme Obesity (BMI>40) Estimated Nutrition Needs (Based on):  
1550 Kcals/day , 60 g(0.8 g/kg) Protein Carbohydrate: At Least 130 g/day  Fluids: 1550 mL/day (1ml/kcal) or per primary team 
 
NUTRITION DIAGNOSES:  
Problem:  No nutritional diagnosis at this time Etiology: related to Signs/Symptoms: as evidenced by NUTRITION INTERVENTIONS: 
Meals/Snacks: General/healthful diet GOAL:  
consume >50% of meals in 5-7 days LEARNING NEEDS (Diet, Food/Nutrient-Drug Interaction):  
 [x] None Identified 
 [] Identified and Education Provided/Documented 
 [] Identified and Pt declined/was not appropriate Cultureal, Orthodoxy, OR Ethnic Dietary Needs:  
 [x] None Identified 
 [] Identified and Addressed 
 
 [x] Interdisciplinary Care Plan Reviewed/Documented  
 [x] Discharge Planning:   General healthy diet MONITORING /EVALUATION:  
  
Food/Nutrient Intake Outcomes: Total energy intake Physical Signs/Symptoms Outcomes: Weight/weight change, Electrolyte and renal profile, GI 
 
NUTRITION RISK:  
 [] High              [] Moderate           [x]  Low  []  Minimal/Uncompromised PT SEEN FOR:  
 []  MD Consult: []Calorie Count []Diabetic Diet Education []Diet Education []Electrolyte Management []General Nutrition Management and Supplements []Management of Tube Feeding []TPN Recommendations []  RN Referral:  []MST score >=2 
   []Enteral/Parenteral Nutrition PTA []Pregnant: Gestational DM or Multigestation 
   []Pressure Ulcer/Wound Care needs 
     
[]  Low BMI [x]  LOS Referral  
 
 
Tevin Mendoza RDN Pager [phone] Weekend Pager [phone]

## 2018-11-03 PROCEDURE — 74011250637 HC RX REV CODE- 250/637: Performed by: INTERNAL MEDICINE

## 2018-11-03 PROCEDURE — 65270000015 HC RM PRIVATE ONCOLOGY

## 2018-11-03 PROCEDURE — 74011250636 HC RX REV CODE- 250/636: Performed by: INTERNAL MEDICINE

## 2018-11-03 PROCEDURE — 74011000250 HC RX REV CODE- 250: Performed by: INTERNAL MEDICINE

## 2018-11-03 RX ADMIN — HYDROMORPHONE HYDROCHLORIDE 1 MG: 1 INJECTION, SOLUTION INTRAMUSCULAR; INTRAVENOUS; SUBCUTANEOUS at 05:43

## 2018-11-03 RX ADMIN — CITALOPRAM HYDROBROMIDE 10 MG: 20 TABLET ORAL at 08:00

## 2018-11-03 RX ADMIN — LORATADINE, PSEUDOEPHEDRINE SULFATE 1 TABLET: 5; 120 TABLET, FILM COATED, EXTENDED RELEASE ORAL at 10:20

## 2018-11-03 RX ADMIN — HYDROMORPHONE HYDROCHLORIDE 1 MG: 1 INJECTION, SOLUTION INTRAMUSCULAR; INTRAVENOUS; SUBCUTANEOUS at 17:03

## 2018-11-03 RX ADMIN — ACETAMINOPHEN 650 MG: 325 TABLET ORAL at 15:34

## 2018-11-03 RX ADMIN — SODIUM CHLORIDE 10 ML: 9 INJECTION INTRAMUSCULAR; INTRAVENOUS; SUBCUTANEOUS at 21:12

## 2018-11-03 RX ADMIN — HYDROMORPHONE HYDROCHLORIDE 1 MG: 1 INJECTION, SOLUTION INTRAMUSCULAR; INTRAVENOUS; SUBCUTANEOUS at 09:10

## 2018-11-03 RX ADMIN — SODIUM CHLORIDE 5 MG: 9 INJECTION INTRAMUSCULAR; INTRAVENOUS; SUBCUTANEOUS at 01:02

## 2018-11-03 RX ADMIN — PANTOPRAZOLE SODIUM 40 MG: 40 TABLET, DELAYED RELEASE ORAL at 15:34

## 2018-11-03 RX ADMIN — SODIUM CHLORIDE 5 MG: 9 INJECTION INTRAMUSCULAR; INTRAVENOUS; SUBCUTANEOUS at 09:09

## 2018-11-03 RX ADMIN — ENOXAPARIN SODIUM 40 MG: 100 INJECTION SUBCUTANEOUS at 08:00

## 2018-11-03 RX ADMIN — DEXTROSE MONOHYDRATE, SODIUM CHLORIDE, AND POTASSIUM CHLORIDE 75 ML/HR: 50; 4.5; 1.49 INJECTION, SOLUTION INTRAVENOUS at 07:57

## 2018-11-03 RX ADMIN — DEXTROSE MONOHYDRATE, SODIUM CHLORIDE, AND POTASSIUM CHLORIDE 75 ML/HR: 50; 4.5; 1.49 INJECTION, SOLUTION INTRAVENOUS at 21:11

## 2018-11-03 RX ADMIN — SODIUM CHLORIDE 5 MG: 9 INJECTION INTRAMUSCULAR; INTRAVENOUS; SUBCUTANEOUS at 05:43

## 2018-11-03 RX ADMIN — HYDROMORPHONE HYDROCHLORIDE 1 MG: 1 INJECTION, SOLUTION INTRAMUSCULAR; INTRAVENOUS; SUBCUTANEOUS at 13:00

## 2018-11-03 RX ADMIN — SODIUM CHLORIDE 10 ML: 9 INJECTION INTRAMUSCULAR; INTRAVENOUS; SUBCUTANEOUS at 17:02

## 2018-11-03 RX ADMIN — HYDROMORPHONE HYDROCHLORIDE 1 MG: 1 INJECTION, SOLUTION INTRAMUSCULAR; INTRAVENOUS; SUBCUTANEOUS at 20:56

## 2018-11-03 RX ADMIN — SODIUM CHLORIDE 5 MG: 9 INJECTION INTRAMUSCULAR; INTRAVENOUS; SUBCUTANEOUS at 17:03

## 2018-11-03 RX ADMIN — SODIUM CHLORIDE 5 MG: 9 INJECTION INTRAMUSCULAR; INTRAVENOUS; SUBCUTANEOUS at 13:00

## 2018-11-03 RX ADMIN — HYDROMORPHONE HYDROCHLORIDE 1 MG: 1 INJECTION, SOLUTION INTRAMUSCULAR; INTRAVENOUS; SUBCUTANEOUS at 01:02

## 2018-11-03 RX ADMIN — FOLIC ACID 1 MG: 1 TABLET ORAL at 08:00

## 2018-11-03 RX ADMIN — LORATADINE, PSEUDOEPHEDRINE SULFATE 1 TABLET: 5; 120 TABLET, FILM COATED, EXTENDED RELEASE ORAL at 21:11

## 2018-11-03 RX ADMIN — SODIUM CHLORIDE 5 MG: 9 INJECTION INTRAMUSCULAR; INTRAVENOUS; SUBCUTANEOUS at 20:56

## 2018-11-03 RX ADMIN — PANTOPRAZOLE SODIUM 40 MG: 40 TABLET, DELAYED RELEASE ORAL at 07:58

## 2018-11-03 RX ADMIN — SODIUM CHLORIDE 10 ML: 9 INJECTION INTRAMUSCULAR; INTRAVENOUS; SUBCUTANEOUS at 15:35

## 2018-11-03 NOTE — PROGRESS NOTES
Bedside shift change report given to Jacque Garcia RN (oncoming nurse) by Magdy Trevino RN (offgoing nurse). Report included the following information SBAR, Kardex, Intake/Output and MAR.

## 2018-11-03 NOTE — PROGRESS NOTES
Hospital Progress Note NAME:  Denny Love :   1960 MRN:  [id number] Date/Time:  11/3/2018 11:07 AM 
 
Plan: 1. Pain control 2. Hydration 3. \encourage activity Risk of Deterioration: Low  []           Moderate  []           High  [] Assessment:  
Principal Problem: 
  Sickle cell pain crisis ([address] (2018) Active Problems: Hypertension (10/3/2014) Depression (10/5/2014) Admission history: 
 
66 y.o. female with PMHx significant for HTN, anemia, sickle cell disease, presents via EMS to the ED with cc of gradual onset chest pain, back pain, nausea and vomiting, onset at ~1830 after eating pink hamburger meat at a party. Daughter reports that pt felt nauseas after eating hamburger meat and started to vomit shortly after. Pt was admitted to hospital in 2018 for sickle cell crisis, and pt reports that current sxs are similar to past sickle cell crisis episode Patient admitted by hospitalist for eval and rx Inerium History/Subjective:  
 
Continues with back discomfort - analgesics adequate 11 Point Review of Systems:  
Negative except no cp/sob []            Unable to obtain ROS due to:      
[]            mental status change []            sedated []            intubated Social History Tobacco Use  Smoking status: Never Smoker  Smokeless tobacco: Never Used Substance Use Topics  Alcohol use: No  
 
Medications reviewed: 
Current Facility-Administered Medications Medication Dose Route Frequency  sodium chloride (NS) flush 10-40 mL  10-40 mL IntraVENous Q8H  
 dextrose 5% - 0.45% NaCl with KCl 20 mEq/L infusion  75 mL/hr IntraVENous CONTINUOUS  
 loratadine-pseudoephedrine (CLARITIN-D 12-hour) 5-120 mg per tablet 1 Tab  1 Tab Oral Q12H  pantoprazole (PROTONIX) tablet 40 mg  40 mg Oral ACB&D  
 acetaminophen (TYLENOL) tablet 650 mg  650 mg Oral E9N PRN  
 folic acid (FOLVITE) tablet 1 mg  1 mg Oral DAILY  citalopram (CELEXA) tablet 10 mg  10 mg Oral DAILY  cyclobenzaprine (FLEXERIL) tablet 10 mg  10 mg Oral TID PRN  
 sodium chloride (NS) flush 5-10 mL  5-10 mL IntraVENous PRN  
 enoxaparin (LOVENOX) injection 40 mg  40 mg SubCUTAneous Q24H  
 ondansetron (ZOFRAN) injection 4 mg  4 mg IntraVENous Q6H PRN  
 bisacodyl (DULCOLAX) tablet 5 mg  5 mg Oral DAILY PRN  prochlorperazine (COMPAZINE) with saline injection 5 mg  5 mg IntraVENous Q4H PRN  
 HYDROmorphone (PF) (DILAUDID) injection 1 mg  1 mg IntraVENous Q4H PRN  
 diphenhydrAMINE (BENADRYL) injection 25 mg  25 mg IntraVENous Q6H PRN Objective:  
Vitals: 
Visit Vitals /84 (BP 1 Location: Left arm, BP Patient Position: At rest) Pulse 82 Temp 98.7 °F (37.1 °C) Resp 16 Wt 170 lb 10.2 oz (77.4 kg) SpO2 100% Breastfeeding? No  
BMI 23.80 kg/m² Temp (24hrs), Av.1 °F (37.3 °C), Min:98.7 °F (37.1 °C), Max:99.3 °F (37.4 °C) O2 Device: Room air Last 24hr Input/Output: 
No intake or output data in the 24 hours ending 18 1107 PHYSICAL EXAM: 
General:    Alert, cooperative, no distress, appears stated age. Head:   Normocephalic, without obvious abnormality, atraumatic. Eyes:   Conjunctivae/corneas clear. PERRLA. Lungs:   Clear to auscultation bilaterally. No Wheezing or Rhonchi. No rales. Heart:   Regular rate and rhythm,  no murmur, rub or gallop. Abdomen:   Soft, non-tender. Not distended. Bowel sounds normal. No masses Lab Data Reviewed: 
 
Recent Labs 18 
0541 WBC 7.6 HGB 7.2* HCT 21.2*  
 Recent Labs 18 
[id number] 18 
8377  138  
K 3.8 3.7 * 110* CO2 25 21 GLU 86 88 BUN 3* 3*  
CREA 0.64 0.63 CA 8.2* 7.7* Lab Results Component Value Date/Time  Glucose (POC) 98 2018 07:39 AM  
 Glucose (POC) 112 (H) 2018 09:03 PM  
 Glucose (POC) 136 (H) 2018 04:31 PM  
 Glucose (POC) 95 2018 12:07 PM  
 Glucose (POC) 103 (H) 07/23/2018 08:27 AM  
 
 
___________________________________________________ 
___________________________________________________ Attending Physician: Konnie Snellen, MD

## 2018-11-03 NOTE — PROGRESS NOTES
Oncology Nursing Communication Tool 6:32 AM 
11/3/2018 Bedside and Verbal shift change report given to iKmberly Hester RN (incoming nurse) by Melody José RN (outgoing nurse) on [facility]. Report included the following information SBAR and Kardex. Shift Summary: pt wanted q4 pain meds through out the night. Issues for physician to address: p.o. Pain meds? Oncology Shift Note Admission Date 10/28/2018 Admission Diagnosis Sickle cell pain crisis ([facility] Code Status Full Code Consults None Cardiac Monitoring [x] Yes [] No  
  
Purposeful Hourly Rounding [x] Yes   
Mukund Score Total Score: 1 Mukund score 3 or > [] Bed Alarm [] Avasys [] 1:1 sitter [] Patient refused (Place signed refusal form in chart) Pain Managed [x] Yes [] No  
 Key Pain Meds HYDROcodone-acetaminophen (NORCO)  mg tablet  (Taking) Take 1 Tab by mouth every six (6) hours as needed. Max Daily Amount: 4 Tabs. fentaNYL (DURAGESIC) 75 mcg/hr  (Taking) 1 Patch by TransDERmal route every seventy-two (72) hours. Max Daily Amount: 1 Patch. Influenza Vaccine Received Flu Vaccine for Current Season (usually Sept-March): No  
 Patient/Guardian Refused (Notify MD): Yes Oxygen needs? [] Room air Oxygen @  []1L    []2L    []3L   []4L    []5L   []6L Use home O2? [] Yes [] No 
Perform O2 challenge test using  smartphrase (.oxygenchallenge) Last bowel movement Last Bowel Movement Date: 11/01/18 
bowel movement Urinary Catheter LDAs Readmission Risk Assessment Tool Score Medium Risk   
      
 17 Total Score 3 Has Seen PCP in Last 6 Months (Yes=3, No=0) 3 Patient Length of Stay (>5 days = 3) 4 IP Visits Last 12 Months (1-3=4, 4=9, >4=11) 5 Pt. Coverage (Medicare=5 , Medicaid, or Self-Pay=4) 2 Charlson Comorbidity Score (Age + Comorbid Conditions) Criteria that do not apply: . Living with Significant Other. Assisted Living. LTAC. SNF. or  
Rehab Expected Length of Stay 2d 16h Actual Length of Stay 6 Makenna Cox RN

## 2018-11-04 PROCEDURE — 74011250637 HC RX REV CODE- 250/637: Performed by: INTERNAL MEDICINE

## 2018-11-04 PROCEDURE — 74011250636 HC RX REV CODE- 250/636: Performed by: INTERNAL MEDICINE

## 2018-11-04 PROCEDURE — 74011000250 HC RX REV CODE- 250: Performed by: INTERNAL MEDICINE

## 2018-11-04 PROCEDURE — 65270000015 HC RM PRIVATE ONCOLOGY

## 2018-11-04 RX ADMIN — SODIUM CHLORIDE 5 MG: 9 INJECTION INTRAMUSCULAR; INTRAVENOUS; SUBCUTANEOUS at 16:39

## 2018-11-04 RX ADMIN — FOLIC ACID 1 MG: 1 TABLET ORAL at 08:02

## 2018-11-04 RX ADMIN — SODIUM CHLORIDE 5 MG: 9 INJECTION INTRAMUSCULAR; INTRAVENOUS; SUBCUTANEOUS at 01:12

## 2018-11-04 RX ADMIN — LORATADINE, PSEUDOEPHEDRINE SULFATE 1 TABLET: 5; 120 TABLET, FILM COATED, EXTENDED RELEASE ORAL at 08:08

## 2018-11-04 RX ADMIN — SODIUM CHLORIDE 5 MG: 9 INJECTION INTRAMUSCULAR; INTRAVENOUS; SUBCUTANEOUS at 21:02

## 2018-11-04 RX ADMIN — CITALOPRAM HYDROBROMIDE 10 MG: 20 TABLET ORAL at 08:02

## 2018-11-04 RX ADMIN — ACETAMINOPHEN 650 MG: 325 TABLET ORAL at 11:39

## 2018-11-04 RX ADMIN — SODIUM CHLORIDE 5 MG: 9 INJECTION INTRAMUSCULAR; INTRAVENOUS; SUBCUTANEOUS at 11:40

## 2018-11-04 RX ADMIN — HYDROMORPHONE HYDROCHLORIDE 1 MG: 1 INJECTION, SOLUTION INTRAMUSCULAR; INTRAVENOUS; SUBCUTANEOUS at 04:22

## 2018-11-04 RX ADMIN — SODIUM CHLORIDE 10 MG: 9 INJECTION INTRAMUSCULAR; INTRAVENOUS; SUBCUTANEOUS at 08:03

## 2018-11-04 RX ADMIN — SODIUM CHLORIDE 10 ML: 9 INJECTION INTRAMUSCULAR; INTRAVENOUS; SUBCUTANEOUS at 16:38

## 2018-11-04 RX ADMIN — ENOXAPARIN SODIUM 40 MG: 100 INJECTION SUBCUTANEOUS at 08:02

## 2018-11-04 RX ADMIN — SODIUM CHLORIDE 5 MG: 9 INJECTION INTRAMUSCULAR; INTRAVENOUS; SUBCUTANEOUS at 04:22

## 2018-11-04 RX ADMIN — PANTOPRAZOLE SODIUM 40 MG: 40 TABLET, DELAYED RELEASE ORAL at 16:38

## 2018-11-04 RX ADMIN — SODIUM CHLORIDE 10 ML: 9 INJECTION INTRAMUSCULAR; INTRAVENOUS; SUBCUTANEOUS at 08:03

## 2018-11-04 RX ADMIN — SODIUM CHLORIDE 10 ML: 9 INJECTION INTRAMUSCULAR; INTRAVENOUS; SUBCUTANEOUS at 21:03

## 2018-11-04 RX ADMIN — DEXTROSE MONOHYDRATE, SODIUM CHLORIDE, AND POTASSIUM CHLORIDE 75 ML/HR: 50; 4.5; 1.49 INJECTION, SOLUTION INTRAVENOUS at 11:39

## 2018-11-04 RX ADMIN — HYDROMORPHONE HYDROCHLORIDE 1 MG: 1 INJECTION, SOLUTION INTRAMUSCULAR; INTRAVENOUS; SUBCUTANEOUS at 01:13

## 2018-11-04 RX ADMIN — HYDROMORPHONE HYDROCHLORIDE 1 MG: 1 INJECTION, SOLUTION INTRAMUSCULAR; INTRAVENOUS; SUBCUTANEOUS at 11:39

## 2018-11-04 RX ADMIN — PANTOPRAZOLE SODIUM 40 MG: 40 TABLET, DELAYED RELEASE ORAL at 08:02

## 2018-11-04 RX ADMIN — HYDROMORPHONE HYDROCHLORIDE 1 MG: 1 INJECTION, SOLUTION INTRAMUSCULAR; INTRAVENOUS; SUBCUTANEOUS at 21:03

## 2018-11-04 RX ADMIN — HYDROMORPHONE HYDROCHLORIDE 1 MG: 1 INJECTION, SOLUTION INTRAMUSCULAR; INTRAVENOUS; SUBCUTANEOUS at 16:38

## 2018-11-04 RX ADMIN — HYDROMORPHONE HYDROCHLORIDE 1 MG: 1 INJECTION, SOLUTION INTRAMUSCULAR; INTRAVENOUS; SUBCUTANEOUS at 08:02

## 2018-11-04 NOTE — PROGRESS NOTES
Hospital Progress Note NAME:  Christiano Robin :   1960 MRN:  [id number] Date/Time:  2018 11:07 AM 
 
Plan: 1. Pain control 2. Hydration 3. encourage activity Risk of Deterioration: Low  []           Moderate  []           High  [] Assessment:  
Principal Problem: 
  Sickle cell pain crisis ([address] (2018) Active Problems: Hypertension (10/3/2014) Depression (10/5/2014) Admission history: 
 
66 y.o. female with PMHx significant for HTN, anemia, sickle cell disease, presents via EMS to the ED with cc of gradual onset chest pain, back pain, nausea and vomiting, onset at ~1830 after eating pink hamburger meat at a party. Daughter reports that pt felt nauseas after eating hamburger meat and started to vomit shortly after. Pt was admitted to hospital in 2018 for sickle cell crisis, and pt reports that current sxs are similar to past sickle cell crisis episode Patient admitted by hospitalist for eval and rx Inerium History/Subjective:  
 
Feeling better 11 Point Review of Systems:  
Negative except no cp/sob []            Unable to obtain ROS due to:      
[]            mental status change []            sedated []            intubated Social History Tobacco Use  Smoking status: Never Smoker  Smokeless tobacco: Never Used Substance Use Topics  Alcohol use: No  
 
Medications reviewed: 
Current Facility-Administered Medications Medication Dose Route Frequency  sodium chloride (NS) flush 10-40 mL  10-40 mL IntraVENous Q8H  
 dextrose 5% - 0.45% NaCl with KCl 20 mEq/L infusion  75 mL/hr IntraVENous CONTINUOUS  
 loratadine-pseudoephedrine (CLARITIN-D 12-hour) 5-120 mg per tablet 1 Tab  1 Tab Oral Q12H  pantoprazole (PROTONIX) tablet 40 mg  40 mg Oral ACB&D  
 acetaminophen (TYLENOL) tablet 650 mg  650 mg Oral B5R PRN  
 folic acid (FOLVITE) tablet 1 mg  1 mg Oral DAILY  citalopram (CELEXA) tablet 10 mg  10 mg Oral DAILY  cyclobenzaprine (FLEXERIL) tablet 10 mg  10 mg Oral TID PRN  
 sodium chloride (NS) flush 5-10 mL  5-10 mL IntraVENous PRN  
 enoxaparin (LOVENOX) injection 40 mg  40 mg SubCUTAneous Q24H  
 ondansetron (ZOFRAN) injection 4 mg  4 mg IntraVENous Q6H PRN  
 bisacodyl (DULCOLAX) tablet 5 mg  5 mg Oral DAILY PRN  prochlorperazine (COMPAZINE) with saline injection 5 mg  5 mg IntraVENous Q4H PRN  
 HYDROmorphone (PF) (DILAUDID) injection 1 mg  1 mg IntraVENous Q4H PRN  
 diphenhydrAMINE (BENADRYL) injection 25 mg  25 mg IntraVENous Q6H PRN Objective:  
Vitals: 
Visit Vitals /86 (BP 1 Location: Left arm, BP Patient Position: At rest;Sitting) Pulse 82 Temp 98.9 °F (37.2 °C) Resp 16 Wt 170 lb 10.2 oz (77.4 kg) SpO2 100% Breastfeeding? No  
BMI 23.80 kg/m² Temp (24hrs), Av.1 °F (37.3 °C), Min:98.9 °F (37.2 °C), Max:99.3 °F (37.4 °C) O2 Device: Room air Last 24hr Input/Output: 
No intake or output data in the 24 hours ending 18 5526 PHYSICAL EXAM: 
General:    Alert, cooperative, no distress, appears stated age. Head:   Normocephalic, without obvious abnormality, atraumatic. Eyes:   Conjunctivae/corneas clear. PERRLA. Lungs:   Clear to auscultation bilaterally. No Wheezing or Rhonchi. No rales. Heart:   Regular rate and rhythm,  no murmur, rub or gallop. Abdomen:   Soft, non-tender. Not distended. Bowel sounds normal. No masses Lab Data Reviewed: 
 
Recent Labs 18 
0541 WBC 7.6 HGB 7.2* HCT 21.2*  
 Recent Labs 18 
0541   
K 3.8 * CO2 25 GLU 86 BUN 3*  
CREA 0.64 CA 8.2* Lab Results Component Value Date/Time  Glucose (POC) 98 2018 07:39 AM  
 Glucose (POC) 112 (H) 2018 09:03 PM  
 Glucose (POC) 136 (H) 2018 04:31 PM  
 Glucose (POC) 95 2018 12:07 PM  
 Glucose (POC) 103 (H) 2018 08:27 AM  
 
 
 ___________________________________________________ 
___________________________________________________ Attending Physician: Flory Fortune, MD

## 2018-11-04 NOTE — PROGRESS NOTES
Oncology Nursing Communication Tool 7:39 AM 
11/4/2018 Bedside shift change report given to Rosa Miranda RN (incoming nurse) by Geraldo Terry (outgoing nurse) on [facility]. Report included the following information SBAR, Kardex, Intake/Output, MAR and Recent Results. Shift Summary: PRN compazine and dilaudid given x3. Pain constant at a 5. Getting better per pt. Issues for physician to address: none Oncology Shift Note Admission Date 10/28/2018 Admission Diagnosis Sickle cell pain crisis ([facility] [address] Code Status Full Code Consults None Cardiac Monitoring [x] Yes [] No  
  
Purposeful Hourly Rounding [x] Yes   
Mukund Score Total Score: 1 Mukund score 3 or > [] Bed Alarm [] Avasys [] 1:1 sitter [] Patient refused (Place signed refusal form in chart) Pain Managed [x] Yes [] No  
 Key Pain Meds HYDROcodone-acetaminophen (NORCO)  mg tablet  (Taking) Take 1 Tab by mouth every six (6) hours as needed. Max Daily Amount: 4 Tabs. fentaNYL (DURAGESIC) 75 mcg/hr  (Taking) 1 Patch by TransDERmal route every seventy-two (72) hours. Max Daily Amount: 1 Patch. Influenza Vaccine Received Flu Vaccine for Current Season (usually Sept-March): No  
 Patient/Guardian Refused (Notify MD): Yes Oxygen needs? [x] Room air Oxygen @  []1L    []2L    []3L   []4L    []5L   []6L Use home O2? [] Yes [] No 
Perform O2 challenge test using  smartphrase (.oxygenchallenge) Last bowel movement Last Bowel Movement Date: 11/01/18 
bowel movement Urinary Catheter LDAs Readmission Risk Assessment Tool Score Medium Risk   
      
 17 Total Score 3 Has Seen PCP in Last 6 Months (Yes=3, No=0) 3 Patient Length of Stay (>5 days = 3) 4 IP Visits Last 12 Months (1-3=4, 4=9, >4=11) 5 Pt. Coverage (Medicare=5 , Medicaid, or Self-Pay=4) 2 Charlson Comorbidity Score (Age + Comorbid Conditions) Criteria that do not apply:  
 . Living with Significant Other. Assisted Living. LTAC. SNF. or  
Rehab Expected Length of Stay 2d 16h Actual Length of Stay 7 Collette Semen

## 2018-11-04 NOTE — PROGRESS NOTES
Bedside shift change report given to SAMIR Salazar (oncoming nurse) by Sylvia Villalobos (offgoing nurse). Report included the following information SBAR, MAR and Med Rec Status.

## 2018-11-05 LAB
ANION GAP SERPL CALC-SCNC: 5 MMOL/L (ref 5–15)
BASOPHILS # BLD: 0 K/UL (ref 0–0.1)
BASOPHILS NFR BLD: 0 % (ref 0–1)
BUN SERPL-MCNC: 6 MG/DL (ref 6–20)
BUN/CREAT SERPL: 10 (ref 12–20)
CALCIUM SERPL-MCNC: 8.1 MG/DL (ref 8.5–10.1)
CHLORIDE SERPL-SCNC: 107 MMOL/L (ref 97–108)
CO2 SERPL-SCNC: 28 MMOL/L (ref 21–32)
CREAT SERPL-MCNC: 0.62 MG/DL (ref 0.55–1.02)
DIFFERENTIAL METHOD BLD: ABNORMAL
EOSINOPHIL # BLD: 0.4 K/UL (ref 0–0.4)
EOSINOPHIL NFR BLD: 5 % (ref 0–7)
ERYTHROCYTE [DISTWIDTH] IN BLOOD BY AUTOMATED COUNT: 14.7 % (ref 11.5–14.5)
GLUCOSE SERPL-MCNC: 90 MG/DL (ref 65–100)
HCT VFR BLD AUTO: 19.3 % (ref 35–47)
HGB BLD-MCNC: 6.7 G/DL (ref 11.5–16)
IMM GRANULOCYTES # BLD: 0 K/UL (ref 0–0.04)
IMM GRANULOCYTES NFR BLD AUTO: 0 % (ref 0–0.5)
LYMPHOCYTES # BLD: 4.3 K/UL (ref 0.8–3.5)
LYMPHOCYTES NFR BLD: 57 % (ref 12–49)
MCH RBC QN AUTO: 31.2 PG (ref 26–34)
MCHC RBC AUTO-ENTMCNC: 34.7 G/DL (ref 30–36.5)
MCV RBC AUTO: 89.8 FL (ref 80–99)
MONOCYTES # BLD: 1.1 K/UL (ref 0–1)
MONOCYTES NFR BLD: 14 % (ref 5–13)
NEUTS SEG # BLD: 1.8 K/UL (ref 1.8–8)
NEUTS SEG NFR BLD: 24 % (ref 32–75)
NRBC # BLD: 0 K/UL (ref 0–0.01)
NRBC BLD-RTO: 0 PER 100 WBC
PLATELET # BLD AUTO: 221 K/UL (ref 150–400)
PMV BLD AUTO: 11.1 FL (ref 8.9–12.9)
POTASSIUM SERPL-SCNC: 3.9 MMOL/L (ref 3.5–5.1)
RBC # BLD AUTO: 2.15 M/UL (ref 3.8–5.2)
RBC MORPH BLD: ABNORMAL
SODIUM SERPL-SCNC: 140 MMOL/L (ref 136–145)
WBC # BLD AUTO: 7.6 K/UL (ref 3.6–11)

## 2018-11-05 PROCEDURE — 80048 BASIC METABOLIC PNL TOTAL CA: CPT | Performed by: INTERNAL MEDICINE

## 2018-11-05 PROCEDURE — 36415 COLL VENOUS BLD VENIPUNCTURE: CPT | Performed by: INTERNAL MEDICINE

## 2018-11-05 PROCEDURE — 74011250636 HC RX REV CODE- 250/636: Performed by: INTERNAL MEDICINE

## 2018-11-05 PROCEDURE — 65270000015 HC RM PRIVATE ONCOLOGY

## 2018-11-05 PROCEDURE — 74011250637 HC RX REV CODE- 250/637: Performed by: INTERNAL MEDICINE

## 2018-11-05 PROCEDURE — 74011000250 HC RX REV CODE- 250: Performed by: INTERNAL MEDICINE

## 2018-11-05 PROCEDURE — 85025 COMPLETE CBC W/AUTO DIFF WBC: CPT | Performed by: INTERNAL MEDICINE

## 2018-11-05 RX ORDER — HYDROMORPHONE HYDROCHLORIDE 1 MG/ML
1 INJECTION, SOLUTION INTRAMUSCULAR; INTRAVENOUS; SUBCUTANEOUS
Status: DISCONTINUED | OUTPATIENT
Start: 2018-11-05 | End: 2018-11-06

## 2018-11-05 RX ORDER — AMOXICILLIN 250 MG/1
250 CAPSULE ORAL EVERY 8 HOURS
Status: DISCONTINUED | OUTPATIENT
Start: 2018-11-05 | End: 2018-11-07 | Stop reason: HOSPADM

## 2018-11-05 RX ORDER — FENTANYL 75 UG/H
1 PATCH TRANSDERMAL
Status: DISCONTINUED | OUTPATIENT
Start: 2018-11-05 | End: 2018-11-07 | Stop reason: HOSPADM

## 2018-11-05 RX ORDER — HYDROCODONE BITARTRATE AND ACETAMINOPHEN 10; 325 MG/1; MG/1
1 TABLET ORAL
Status: DISCONTINUED | OUTPATIENT
Start: 2018-11-05 | End: 2018-11-07 | Stop reason: HOSPADM

## 2018-11-05 RX ORDER — ACETAMINOPHEN 325 MG/1
650 TABLET ORAL
Status: DISCONTINUED | OUTPATIENT
Start: 2018-11-05 | End: 2018-11-07 | Stop reason: HOSPADM

## 2018-11-05 RX ADMIN — HYDROMORPHONE HYDROCHLORIDE 1 MG: 1 INJECTION, SOLUTION INTRAMUSCULAR; INTRAVENOUS; SUBCUTANEOUS at 12:45

## 2018-11-05 RX ADMIN — AMOXICILLIN 250 MG: 250 CAPSULE ORAL at 14:10

## 2018-11-05 RX ADMIN — FOLIC ACID 1 MG: 1 TABLET ORAL at 09:09

## 2018-11-05 RX ADMIN — HYDROCODONE BITARTRATE AND ACETAMINOPHEN 1 TABLET: 10; 325 TABLET ORAL at 17:09

## 2018-11-05 RX ADMIN — SODIUM CHLORIDE 10 ML: 9 INJECTION INTRAMUSCULAR; INTRAVENOUS; SUBCUTANEOUS at 21:02

## 2018-11-05 RX ADMIN — ENOXAPARIN SODIUM 40 MG: 100 INJECTION SUBCUTANEOUS at 09:10

## 2018-11-05 RX ADMIN — PANTOPRAZOLE SODIUM 40 MG: 40 TABLET, DELAYED RELEASE ORAL at 07:36

## 2018-11-05 RX ADMIN — HYDROCODONE BITARTRATE AND ACETAMINOPHEN 1 TABLET: 10; 325 TABLET ORAL at 21:02

## 2018-11-05 RX ADMIN — SODIUM CHLORIDE 5 MG: 9 INJECTION INTRAMUSCULAR; INTRAVENOUS; SUBCUTANEOUS at 06:04

## 2018-11-05 RX ADMIN — SODIUM CHLORIDE 5 MG: 9 INJECTION INTRAMUSCULAR; INTRAVENOUS; SUBCUTANEOUS at 09:10

## 2018-11-05 RX ADMIN — SODIUM CHLORIDE 10 ML: 9 INJECTION INTRAMUSCULAR; INTRAVENOUS; SUBCUTANEOUS at 16:05

## 2018-11-05 RX ADMIN — SODIUM CHLORIDE 5 MG: 9 INJECTION INTRAMUSCULAR; INTRAVENOUS; SUBCUTANEOUS at 21:02

## 2018-11-05 RX ADMIN — LORATADINE, PSEUDOEPHEDRINE SULFATE 1 TABLET: 5; 120 TABLET, FILM COATED, EXTENDED RELEASE ORAL at 10:29

## 2018-11-05 RX ADMIN — SODIUM CHLORIDE 5 MG: 9 INJECTION INTRAMUSCULAR; INTRAVENOUS; SUBCUTANEOUS at 12:44

## 2018-11-05 RX ADMIN — SODIUM CHLORIDE 5 MG: 9 INJECTION INTRAMUSCULAR; INTRAVENOUS; SUBCUTANEOUS at 00:58

## 2018-11-05 RX ADMIN — DEXTROSE MONOHYDRATE, SODIUM CHLORIDE, AND POTASSIUM CHLORIDE 75 ML/HR: 50; 4.5; 1.49 INJECTION, SOLUTION INTRAVENOUS at 00:57

## 2018-11-05 RX ADMIN — LORATADINE, PSEUDOEPHEDRINE SULFATE 1 TABLET: 5; 120 TABLET, FILM COATED, EXTENDED RELEASE ORAL at 03:02

## 2018-11-05 RX ADMIN — CITALOPRAM HYDROBROMIDE 10 MG: 20 TABLET ORAL at 09:09

## 2018-11-05 RX ADMIN — LORATADINE, PSEUDOEPHEDRINE SULFATE 1 TABLET: 5; 120 TABLET, FILM COATED, EXTENDED RELEASE ORAL at 21:02

## 2018-11-05 RX ADMIN — SODIUM CHLORIDE 5 MG: 9 INJECTION INTRAMUSCULAR; INTRAVENOUS; SUBCUTANEOUS at 17:03

## 2018-11-05 RX ADMIN — HYDROMORPHONE HYDROCHLORIDE 1 MG: 1 INJECTION, SOLUTION INTRAMUSCULAR; INTRAVENOUS; SUBCUTANEOUS at 06:05

## 2018-11-05 RX ADMIN — ACETAMINOPHEN 650 MG: 325 TABLET ORAL at 12:44

## 2018-11-05 RX ADMIN — HYDROMORPHONE HYDROCHLORIDE 1 MG: 1 INJECTION, SOLUTION INTRAMUSCULAR; INTRAVENOUS; SUBCUTANEOUS at 00:58

## 2018-11-05 RX ADMIN — HYDROMORPHONE HYDROCHLORIDE 1 MG: 1 INJECTION, SOLUTION INTRAMUSCULAR; INTRAVENOUS; SUBCUTANEOUS at 21:01

## 2018-11-05 RX ADMIN — AMOXICILLIN 250 MG: 250 CAPSULE ORAL at 21:11

## 2018-11-05 RX ADMIN — AMOXICILLIN 250 MG: 250 CAPSULE ORAL at 09:50

## 2018-11-05 RX ADMIN — PANTOPRAZOLE SODIUM 40 MG: 40 TABLET, DELAYED RELEASE ORAL at 16:05

## 2018-11-05 RX ADMIN — SODIUM CHLORIDE 10 ML: 9 INJECTION INTRAMUSCULAR; INTRAVENOUS; SUBCUTANEOUS at 07:37

## 2018-11-05 NOTE — PROGRESS NOTES
General Daily Progress Note Admit Date: 10/28/2018 Subjective:  
 
Patient continues to complain of pain but significantly improved. Current Facility-Administered Medications Medication Dose Route Frequency  HYDROmorphone (PF) (DILAUDID) injection 1 mg  1 mg IntraVENous Q8H PRN  
 HYDROcodone-acetaminophen (NORCO)  mg tablet 1 Tab  1 Tab Oral Q4H PRN  
 fentaNYL (DURAGESIC) 75 mcg/hr patch 1 Patch  1 Patch TransDERmal Q72H  acetaminophen (TYLENOL) tablet 650 mg  650 mg Oral Q8H PRN  
 sodium chloride (NS) flush 10-40 mL  10-40 mL IntraVENous Q8H  
 dextrose 5% - 0.45% NaCl with KCl 20 mEq/L infusion  50 mL/hr IntraVENous CONTINUOUS  
 loratadine-pseudoephedrine (CLARITIN-D 12-hour) 5-120 mg per tablet 1 Tab  1 Tab Oral Q12H  pantoprazole (PROTONIX) tablet 40 mg  40 mg Oral ACB&D  
 folic acid (FOLVITE) tablet 1 mg  1 mg Oral DAILY  citalopram (CELEXA) tablet 10 mg  10 mg Oral DAILY  cyclobenzaprine (FLEXERIL) tablet 10 mg  10 mg Oral TID PRN  
 sodium chloride (NS) flush 5-10 mL  5-10 mL IntraVENous PRN  
 enoxaparin (LOVENOX) injection 40 mg  40 mg SubCUTAneous Q24H  
 ondansetron (ZOFRAN) injection 4 mg  4 mg IntraVENous Q6H PRN  
 bisacodyl (DULCOLAX) tablet 5 mg  5 mg Oral DAILY PRN  prochlorperazine (COMPAZINE) with saline injection 5 mg  5 mg IntraVENous Q4H PRN  
 diphenhydrAMINE (BENADRYL) injection 25 mg  25 mg IntraVENous Q6H PRN Review of Systems A comprehensive review of systems was negative. Objective:  
 
Patient Vitals for the past 24 hrs: 
 BP Temp Pulse Resp SpO2  
11/05/18 0750 139/86 99.1 °F (37.3 °C) 90 18 99 % 11/05/18 0258 132/73 99 °F (37.2 °C) 81 17 99 % 11/04/18 2346 146/72 98.9 °F (37.2 °C) 86 16 100 % 11/04/18 2027 152/77 98.9 °F (37.2 °C) 87 15 100 % 11/04/18 1512 136/84 98.7 °F (37.1 °C) 92 16 100 % 11/04/18 1137 118/76 99.7 °F (37.6 °C) 97 16 100 % No intake/output data recorded. 11/03 1901 - 11/05 0700 In: 1000 [P.O.:1000] Out: - Physical Exam:  
Visit Vitals /86 (BP 1 Location: Left arm, BP Patient Position: Sitting) Pulse 90 Temp 99.1 °F (37.3 °C) Resp 18 Wt 170 lb 10.2 oz (77.4 kg) SpO2 99% Breastfeeding? No  
BMI 23.80 kg/m² General appearance: alert, cooperative, no distress, appears stated age Neck: supple, symmetrical, trachea midline, no adenopathy, thyroid: not enlarged, symmetric, no tenderness/mass/nodules, no carotid bruit and no JVD Lungs: clear to auscultation bilaterally Heart: regular rate and rhythm, S1, S2 normal, no murmur, click, rub or gallop Abdomen: soft, non-tender. Bowel sounds normal. No masses,  no organomegaly Extremities: extremities normal, atraumatic, no cyanosis or edema Data Review Recent Results (from the past 24 hour(s)) METABOLIC PANEL, BASIC Collection Time: 11/05/18  2:57 AM  
Result Value Ref Range Sodium 140 136 - 145 mmol/L Potassium 3.9 3.5 - 5.1 mmol/L Chloride 107 97 - 108 mmol/L  
 CO2 28 21 - 32 mmol/L Anion gap 5 5 - 15 mmol/L Glucose 90 65 - 100 mg/dL BUN 6 6 - 20 MG/DL Creatinine 0.62 0.55 - 1.02 MG/DL  
 BUN/Creatinine ratio 10 (L) 12 - 20 GFR est AA >60 >60 ml/min/1.73m2 GFR est non-AA >60 >60 ml/min/1.73m2 Calcium 8.1 (L) 8.5 - 10.1 MG/DL  
CBC WITH AUTOMATED DIFF Collection Time: 11/05/18  2:57 AM  
Result Value Ref Range WBC 7.6 3.6 - 11.0 K/uL  
 RBC 2.15 (L) 3.80 - 5.20 M/uL HGB 6.7 (L) 11.5 - 16.0 g/dL HCT 19.3 (L) 35.0 - 47.0 % MCV 89.8 80.0 - 99.0 FL  
 MCH 31.2 26.0 - 34.0 PG  
 MCHC 34.7 30.0 - 36.5 g/dL  
 RDW 14.7 (H) 11.5 - 14.5 % PLATELET 809 728 - 086 K/uL MPV 11.1 8.9 - 12.9 FL  
 NRBC 0.0 0  WBC ABSOLUTE NRBC 0.00 0.00 - 0.01 K/uL NEUTROPHILS 24 (L) 32 - 75 % LYMPHOCYTES 57 (H) 12 - 49 % MONOCYTES 14 (H) 5 - 13 % EOSINOPHILS 5 0 - 7 % BASOPHILS 0 0 - 1 % IMMATURE GRANULOCYTES 0 0.0 - 0.5 % ABS. NEUTROPHILS 1.8 1.8 - 8.0 K/UL  
 ABS. LYMPHOCYTES 4.3 (H) 0.8 - 3.5 K/UL  
 ABS. MONOCYTES 1.1 (H) 0.0 - 1.0 K/UL  
 ABS. EOSINOPHILS 0.4 0.0 - 0.4 K/UL  
 ABS. BASOPHILS 0.0 0.0 - 0.1 K/UL  
 ABS. IMM. GRANS. 0.0 0.00 - 0.04 K/UL  
 DF AUTOMATED    
 RBC COMMENTS ANISOCYTOSIS 1+ 
    
 RBC COMMENTS TARGET CELLS 
PRESENT 
    
 RBC COMMENTS SICKLE CELLS 
PRESENT Assessment:  
 
Principal Problem: 
  Sickle cell pain crisis ([facility] [address] (9/8/2018) Active Problems: Hypertension (10/3/2014) Depression (10/5/2014) Plan: 1. Significant improvement painful crises therefore we will reduce analgesics in anticipation of discharge on Tuesday or Wednesday at the latest. 
2.  We will mobilize 3. Hydration adequate.

## 2018-11-05 NOTE — PROGRESS NOTES
Bedside shift change report given to Lara Jolley RN (oncoming nurse) by Júnior Russell RN (offgoing nurse). Report included the following information SBAR, Kardex and MAR.

## 2018-11-05 NOTE — PROGRESS NOTES
Bedside shift report received by Franki Esteves on Blanca Morillo including SBAR, MAR, Med Rec. Oncology Nursing Communication Tool 5:49 AM 
11/5/2018 Bedside shift change report given to Cristhian Crump (incoming nurse) by Elizabeth Tavarez (outgoing nurse) on Katherynamarie Ormond. Report included the following information SBAR, Kardex, Intake/Output, MAR and Cardiac Rhythm NSR. Shift Summary: required her prn compazine and diladid every 4 hours, slept well, voided once Issues for physician to address: N/A Oncology Shift Note Admission Date 10/28/2018 Admission Diagnosis Sickle cell pain crisis ([facility] [address] Code Status Full Code Consults None Cardiac Monitoring [] Yes [] No  
  
Purposeful Hourly Rounding [] Yes   
Mukund Score Total Score: 1 Mukund score 3 or > [] Bed Alarm [] Avasys [] 1:1 sitter [] Patient refused (Place signed refusal form in chart) Pain Managed [] Yes [] No  
 Key Pain Meds HYDROcodone-acetaminophen (NORCO)  mg tablet  (Taking) Take 1 Tab by mouth every six (6) hours as needed. Max Daily Amount: 4 Tabs. fentaNYL (DURAGESIC) 75 mcg/hr  (Taking) 1 Patch by TransDERmal route every seventy-two (72) hours. Max Daily Amount: 1 Patch. Influenza Vaccine Received Flu Vaccine for Current Season (usually Sept-March): No  
 Patient/Guardian Refused (Notify MD): Yes Oxygen needs? [] Room air Oxygen @  []1L    []2L    []3L   []4L    []5L   []6L Use home O2? [] Yes [] No 
Perform O2 challenge test using  smartphrase (.oxygenchallenge) Last bowel movement Last Bowel Movement Date: 11/01/18 
bowel movement Urinary Catheter LDAs Readmission Risk Assessment Tool Score Medium Risk   
      
 17 Total Score 3 Has Seen PCP in Last 6 Months (Yes=3, No=0) 3 Patient Length of Stay (>5 days = 3) 4 IP Visits Last 12 Months (1-3=4, 4=9, >4=11) 5 Pt. Coverage (Medicare=5 , Medicaid, or Self-Pay=4) 2 Charlson Comorbidity Score (Age + Comorbid Conditions) Criteria that do not apply:  
 . Living with Significant Other. Assisted Living. LTAC. SNF. or  
Rehab Expected Length of Stay 2d 16h Actual Length of Stay 8 Elizabeth So

## 2018-11-05 NOTE — PROGRESS NOTES
Oncology Interdisciplinary rounds were held today to discuss patient plan of care and outcomes. The following members were present: Nursing, Case Management, Pharmacy and Dietary. Actual Length of Stay: 8 DRG GLOS: 2.7 Expected Length of Stay: 2d 16h Plan               Mobility         Discharge Plan Ambulate IVF Pain control ambulate Home 11/7

## 2018-11-06 PROCEDURE — 74011250637 HC RX REV CODE- 250/637: Performed by: INTERNAL MEDICINE

## 2018-11-06 PROCEDURE — 74011250636 HC RX REV CODE- 250/636: Performed by: INTERNAL MEDICINE

## 2018-11-06 PROCEDURE — 74011000250 HC RX REV CODE- 250: Performed by: INTERNAL MEDICINE

## 2018-11-06 PROCEDURE — 65270000015 HC RM PRIVATE ONCOLOGY

## 2018-11-06 RX ORDER — HYDROMORPHONE HYDROCHLORIDE 1 MG/ML
0.5 INJECTION, SOLUTION INTRAMUSCULAR; INTRAVENOUS; SUBCUTANEOUS
Status: DISCONTINUED | OUTPATIENT
Start: 2018-11-06 | End: 2018-11-07 | Stop reason: HOSPADM

## 2018-11-06 RX ADMIN — SODIUM CHLORIDE 5 MG: 9 INJECTION INTRAMUSCULAR; INTRAVENOUS; SUBCUTANEOUS at 13:27

## 2018-11-06 RX ADMIN — LORATADINE, PSEUDOEPHEDRINE SULFATE 1 TABLET: 5; 120 TABLET, FILM COATED, EXTENDED RELEASE ORAL at 21:10

## 2018-11-06 RX ADMIN — AMOXICILLIN 250 MG: 250 CAPSULE ORAL at 21:10

## 2018-11-06 RX ADMIN — FOLIC ACID 1 MG: 1 TABLET ORAL at 09:22

## 2018-11-06 RX ADMIN — AMOXICILLIN 250 MG: 250 CAPSULE ORAL at 05:38

## 2018-11-06 RX ADMIN — SODIUM CHLORIDE 5 MG: 9 INJECTION INTRAMUSCULAR; INTRAVENOUS; SUBCUTANEOUS at 17:32

## 2018-11-06 RX ADMIN — HYDROCODONE BITARTRATE AND ACETAMINOPHEN 1 TABLET: 10; 325 TABLET ORAL at 05:38

## 2018-11-06 RX ADMIN — CITALOPRAM HYDROBROMIDE 10 MG: 20 TABLET ORAL at 09:22

## 2018-11-06 RX ADMIN — LORATADINE, PSEUDOEPHEDRINE SULFATE 1 TABLET: 5; 120 TABLET, FILM COATED, EXTENDED RELEASE ORAL at 09:21

## 2018-11-06 RX ADMIN — HYDROMORPHONE HYDROCHLORIDE 1 MG: 1 INJECTION, SOLUTION INTRAMUSCULAR; INTRAVENOUS; SUBCUTANEOUS at 05:38

## 2018-11-06 RX ADMIN — HYDROMORPHONE HYDROCHLORIDE 0.5 MG: 1 INJECTION, SOLUTION INTRAMUSCULAR; INTRAVENOUS; SUBCUTANEOUS at 14:55

## 2018-11-06 RX ADMIN — SODIUM CHLORIDE 5 MG: 9 INJECTION INTRAMUSCULAR; INTRAVENOUS; SUBCUTANEOUS at 09:21

## 2018-11-06 RX ADMIN — HYDROCODONE BITARTRATE AND ACETAMINOPHEN 1 TABLET: 10; 325 TABLET ORAL at 17:33

## 2018-11-06 RX ADMIN — HYDROCODONE BITARTRATE AND ACETAMINOPHEN 1 TABLET: 10; 325 TABLET ORAL at 13:27

## 2018-11-06 RX ADMIN — PANTOPRAZOLE SODIUM 40 MG: 40 TABLET, DELAYED RELEASE ORAL at 09:22

## 2018-11-06 RX ADMIN — HYDROCODONE BITARTRATE AND ACETAMINOPHEN 1 TABLET: 10; 325 TABLET ORAL at 21:10

## 2018-11-06 RX ADMIN — HYDROCODONE BITARTRATE AND ACETAMINOPHEN 1 TABLET: 10; 325 TABLET ORAL at 09:22

## 2018-11-06 RX ADMIN — PANTOPRAZOLE SODIUM 40 MG: 40 TABLET, DELAYED RELEASE ORAL at 15:00

## 2018-11-06 RX ADMIN — SODIUM CHLORIDE 5 MG: 9 INJECTION INTRAMUSCULAR; INTRAVENOUS; SUBCUTANEOUS at 01:25

## 2018-11-06 RX ADMIN — AMOXICILLIN 250 MG: 250 CAPSULE ORAL at 13:27

## 2018-11-06 RX ADMIN — SODIUM CHLORIDE 5 MG: 9 INJECTION INTRAMUSCULAR; INTRAVENOUS; SUBCUTANEOUS at 21:11

## 2018-11-06 RX ADMIN — SODIUM CHLORIDE 10 ML: 9 INJECTION INTRAMUSCULAR; INTRAVENOUS; SUBCUTANEOUS at 21:12

## 2018-11-06 RX ADMIN — SODIUM CHLORIDE 5 MG: 9 INJECTION INTRAMUSCULAR; INTRAVENOUS; SUBCUTANEOUS at 05:38

## 2018-11-06 RX ADMIN — ENOXAPARIN SODIUM 40 MG: 100 INJECTION SUBCUTANEOUS at 09:00

## 2018-11-06 RX ADMIN — HYDROCODONE BITARTRATE AND ACETAMINOPHEN 1 TABLET: 10; 325 TABLET ORAL at 01:25

## 2018-11-06 RX ADMIN — SODIUM CHLORIDE 10 ML: 9 INJECTION INTRAMUSCULAR; INTRAVENOUS; SUBCUTANEOUS at 14:00

## 2018-11-06 NOTE — PROGRESS NOTES
Oncology Interdisciplinary rounds were held today to discuss patient plan of care and outcomes. The following members were present: Nursing, Case Management, Pharmacy and Dietary. Actual Length of Stay: 9 DRG GLOS: 2.7 Expected Length of Stay: 2d 16h Plan               Mobility         Discharge Plan Continue pain control Up ad erum 11/7

## 2018-11-06 NOTE — PROGRESS NOTES
General Daily Progress Note Admit Date: 10/28/2018 Subjective:  
 
Patient has noted a slight increasing pain. Current Facility-Administered Medications Medication Dose Route Frequency  HYDROmorphone (PF) (DILAUDID) injection 0.5 mg  0.5 mg IntraVENous Q12H PRN  
 HYDROcodone-acetaminophen (NORCO)  mg tablet 1 Tab  1 Tab Oral Q4H PRN  
 fentaNYL (DURAGESIC) 75 mcg/hr patch 1 Patch  1 Patch TransDERmal Q72H  acetaminophen (TYLENOL) tablet 650 mg  650 mg Oral Q8H PRN  
 amoxicillin (AMOXIL) capsule 250 mg  250 mg Oral Q8H  
 sodium chloride (NS) flush 10-40 mL  10-40 mL IntraVENous Q8H  
 dextrose 5% - 0.45% NaCl with KCl 20 mEq/L infusion  25 mL/hr IntraVENous CONTINUOUS  
 loratadine-pseudoephedrine (CLARITIN-D 12-hour) 5-120 mg per tablet 1 Tab  1 Tab Oral Q12H  pantoprazole (PROTONIX) tablet 40 mg  40 mg Oral ACB&D  
 folic acid (FOLVITE) tablet 1 mg  1 mg Oral DAILY  citalopram (CELEXA) tablet 10 mg  10 mg Oral DAILY  cyclobenzaprine (FLEXERIL) tablet 10 mg  10 mg Oral TID PRN  
 sodium chloride (NS) flush 5-10 mL  5-10 mL IntraVENous PRN  
 enoxaparin (LOVENOX) injection 40 mg  40 mg SubCUTAneous Q24H  
 ondansetron (ZOFRAN) injection 4 mg  4 mg IntraVENous Q6H PRN  
 bisacodyl (DULCOLAX) tablet 5 mg  5 mg Oral DAILY PRN  prochlorperazine (COMPAZINE) with saline injection 5 mg  5 mg IntraVENous Q4H PRN  
 diphenhydrAMINE (BENADRYL) injection 25 mg  25 mg IntraVENous Q6H PRN Review of Systems A comprehensive review of systems was negative. Objective:  
 
Patient Vitals for the past 24 hrs: 
 BP Temp Pulse Resp SpO2  
11/06/18 0826 137/79 98.4 °F (36.9 °C) 78 16 98 % 11/06/18 0311 142/77 99.1 °F (37.3 °C) 80 16 100 % 11/05/18 2243 123/75 99.2 °F (37.3 °C) 90 16 100 % 11/05/18 1923 116/80 99.8 °F (37.7 °C) 94 16 100 % 11/05/18 1549 115/81 99.5 °F (37.5 °C) 92 20 100 % 11/05/18 1255 111/82 98.5 °F (36.9 °C) 90 18 100 % No intake/output data recorded. 11/04 1901 - 11/06 0700 In: 1000 [P.O.:1000] Out: - Physical Exam:  
Visit Vitals /79 Pulse 78 Temp 98.4 °F (36.9 °C) Resp 16 Wt 170 lb 10.2 oz (77.4 kg) SpO2 98% Breastfeeding? No  
BMI 23.80 kg/m² General appearance: alert, cooperative, no distress, appears stated age Neck: supple, symmetrical, trachea midline, no adenopathy, thyroid: not enlarged, symmetric, no tenderness/mass/nodules, no carotid bruit and no JVD Lungs: clear to auscultation bilaterally Heart: regular rate and rhythm, S1, S2 normal, no murmur, click, rub or gallop Abdomen: soft, non-tender. Bowel sounds normal. No masses,  no organomegaly Extremities: extremities normal, atraumatic, no cyanosis or edema Data Review No results found for this or any previous visit (from the past 24 hour(s)). Assessment:  
 
Principal Problem: 
  Sickle cell pain crisis ([facility] [address] (9/8/2018) Active Problems: Hypertension (10/3/2014) Depression (10/5/2014) Plan: 1. Continued improvement. I will plan discharge tomorrow if all goes well today. 2.  Continue treatment of sinusitis. 3.  We will mobilize.

## 2018-11-06 NOTE — PROGRESS NOTES
Oncology Nursing Communication Tool 6:28 AM 
11/6/2018 Bedside and Verbal shift change report given to Manoj RN (incoming nurse) by Jay Sorto RN (outgoing nurse) on Annamarie Ormond. Report included the following information SBAR and Kardex. Shift Summary: pt rested through out the night, pain was managed with prn medication Issues for physician to address: discharge Oncology Shift Note Admission Date 10/28/2018 Admission Diagnosis Sickle cell pain crisis ([address] Code Status Full Code Consults None Cardiac Monitoring [x] Yes [] No  
  
Purposeful Hourly Rounding [x] Yes   
Mukund Score Total Score: 1 Mukund score 3 or > [] Bed Alarm [] Avasys [] 1:1 sitter [] Patient refused (Place signed refusal form in chart) Pain Managed [x] Yes [] No  
 Key Pain Meds HYDROcodone-acetaminophen (NORCO)  mg tablet  (Taking) Take 1 Tab by mouth every six (6) hours as needed. Max Daily Amount: 4 Tabs. fentaNYL (DURAGESIC) 75 mcg/hr  (Taking) 1 Patch by TransDERmal route every seventy-two (72) hours. Max Daily Amount: 1 Patch. Influenza Vaccine Received Flu Vaccine for Current Season (usually Sept-March): No  
 Patient/Guardian Refused (Notify MD): Yes Oxygen needs? [x] Room air Oxygen @  []1L    []2L    []3L   []4L    []5L   []6L Use home O2? [] Yes [x] No 
Perform O2 challenge test using  smartphrase (.oxygenchallenge) Last bowel movement Last Bowel Movement Date: 11/01/18 
bowel movement Urinary Catheter LDAs Readmission Risk Assessment Tool Score Medium Risk   
      
 17 Total Score 3 Has Seen PCP in Last 6 Months (Yes=3, No=0) 3 Patient Length of Stay (>5 days = 3) 4 IP Visits Last 12 Months (1-3=4, 4=9, >4=11) 5 Pt. Coverage (Medicare=5 , Medicaid, or Self-Pay=4) 2 Charlson Comorbidity Score (Age + Comorbid Conditions) Criteria that do not apply:  
 . Living with Significant Other. Assisted Living. LTAC. SNF. or  
Rehab Expected Length of Stay 2d 16h Actual Length of Stay 9 Martha Corea RN

## 2018-11-07 VITALS
SYSTOLIC BLOOD PRESSURE: 119 MMHG | TEMPERATURE: 98.4 F | OXYGEN SATURATION: 97 % | WEIGHT: 170.64 LBS | RESPIRATION RATE: 16 BRPM | BODY MASS INDEX: 23.8 KG/M2 | HEART RATE: 78 BPM | DIASTOLIC BLOOD PRESSURE: 65 MMHG

## 2018-11-07 PROCEDURE — 74011250636 HC RX REV CODE- 250/636: Performed by: INTERNAL MEDICINE

## 2018-11-07 PROCEDURE — 74011250637 HC RX REV CODE- 250/637: Performed by: INTERNAL MEDICINE

## 2018-11-07 PROCEDURE — 74011000250 HC RX REV CODE- 250: Performed by: INTERNAL MEDICINE

## 2018-11-07 RX ADMIN — LORATADINE, PSEUDOEPHEDRINE SULFATE 1 TABLET: 5; 120 TABLET, FILM COATED, EXTENDED RELEASE ORAL at 08:58

## 2018-11-07 RX ADMIN — FOLIC ACID 1 MG: 1 TABLET ORAL at 08:58

## 2018-11-07 RX ADMIN — CITALOPRAM HYDROBROMIDE 10 MG: 20 TABLET ORAL at 08:58

## 2018-11-07 RX ADMIN — SODIUM CHLORIDE 10 ML: 9 INJECTION INTRAMUSCULAR; INTRAVENOUS; SUBCUTANEOUS at 06:51

## 2018-11-07 RX ADMIN — SODIUM CHLORIDE 5 MG: 9 INJECTION INTRAMUSCULAR; INTRAVENOUS; SUBCUTANEOUS at 02:44

## 2018-11-07 RX ADMIN — PANTOPRAZOLE SODIUM 40 MG: 40 TABLET, DELAYED RELEASE ORAL at 06:50

## 2018-11-07 RX ADMIN — SODIUM CHLORIDE 5 MG: 9 INJECTION INTRAMUSCULAR; INTRAVENOUS; SUBCUTANEOUS at 06:56

## 2018-11-07 RX ADMIN — SODIUM CHLORIDE 5 MG: 9 INJECTION INTRAMUSCULAR; INTRAVENOUS; SUBCUTANEOUS at 11:09

## 2018-11-07 RX ADMIN — HYDROCODONE BITARTRATE AND ACETAMINOPHEN 1 TABLET: 10; 325 TABLET ORAL at 06:50

## 2018-11-07 RX ADMIN — AMOXICILLIN 250 MG: 250 CAPSULE ORAL at 06:50

## 2018-11-07 RX ADMIN — HYDROMORPHONE HYDROCHLORIDE 0.5 MG: 1 INJECTION, SOLUTION INTRAMUSCULAR; INTRAVENOUS; SUBCUTANEOUS at 02:43

## 2018-11-07 NOTE — PROGRESS NOTES
1900--bedside report received from frank hough  Pt up to chair, oriented x 4, no complaints a this time. Assessment as noted 2100--1 norco given for generalized 4/10 pain per prn orders . call bell in reach 
 
0330--0.5mg dilaudid given for generalized pain, per prn orders

## 2018-11-07 NOTE — DISCHARGE INSTRUCTIONS
General Discharge Instructions    Patient ID:  Lonny Arshad  [id number]  66 y.o.  1960    Patient Instructions        The following personal items were collected during your admission and were returned to you. Take Home Medications           What to do at Home    Recommended diet: Regular Diet    Recommended activity: Activity as tolerated    Follow-up with Shawn Ramsey MD  in 5 days. Information obtained by :  I understand that if any problems occur once I am at home I am to contact my physician. I understand and acknowledge receipt of the instructions indicated above.                                                                                                                                            Physician's or R.N.'s Signature                                                                  Date/Time                                                                                                                                              Patient or Representative Signature                                                          Date/Time

## 2018-11-07 NOTE — PROGRESS NOTES
Patient being picked up by brother. Amoxicillin script send to pharmacy by Dr. Gisella Trevino nurse Follow ups reviewed opportunity for questions and clarification provided.

## 2018-11-07 NOTE — PROGRESS NOTES
Patient has discharge order - states she is going to her own home where she lives w/ her daughter. Patient's brother will be providing transport home but is unable to get to hospital prior to 4PM - patient has declined transport arranged by CM. Patient has informed nursing that she does not want IV access removed until brother able to come as she \"may need IV pain meds\" prior to discharge. Patient has follow-up appt with PCP scheduled and on AVS.  She states she will have transportation to and from the appt. Patient provided 2nd IM letter and signed copy placed on chart. Mary Cheng RN, BSN, ACM  - Medical Oncology [phone]

## 2018-11-07 NOTE — PROGRESS NOTES
Discharge order received this morning. Discharge teaching and follow up appointments reviewed at bedside with patient. Patient delaying discharge stating her brother may not be able to pick her up until after 4pm today. Patient refusing to have midline removed at this time to continue IV access for PRN IV Dilaudid. Maintenance IV fluids have been stopped at this time. Will continue to monitor

## 2018-11-08 RX ORDER — PROMETHAZINE HYDROCHLORIDE 25 MG/1
25 TABLET ORAL
OUTPATIENT
Start: 2018-11-08

## 2018-11-13 ENCOUNTER — OFFICE VISIT (OUTPATIENT)
Dept: INTERNAL MEDICINE CLINIC | Age: 58
End: 2018-11-13

## 2018-11-13 VITALS
OXYGEN SATURATION: 93 % | SYSTOLIC BLOOD PRESSURE: 100 MMHG | WEIGHT: 197.1 LBS | RESPIRATION RATE: 16 BRPM | HEIGHT: 71 IN | HEART RATE: 99 BPM | TEMPERATURE: 98.6 F | DIASTOLIC BLOOD PRESSURE: 61 MMHG | BODY MASS INDEX: 27.59 KG/M2

## 2018-11-13 DIAGNOSIS — D57.00 SICKLE CELL CRISIS (HCC): Primary | ICD-10-CM

## 2018-11-13 DIAGNOSIS — I10 ESSENTIAL HYPERTENSION: ICD-10-CM

## 2018-11-13 DIAGNOSIS — K52.9 GASTROENTERITIS: ICD-10-CM

## 2018-11-13 DIAGNOSIS — F32.A DEPRESSION, UNSPECIFIED DEPRESSION TYPE: ICD-10-CM

## 2018-11-13 PROBLEM — R11.10 VOMITING: Status: RESOLVED | Noted: 2018-07-17 | Resolved: 2018-11-13

## 2018-11-13 PROBLEM — R11.2 NAUSEA AND VOMITING: Status: RESOLVED | Noted: 2018-07-15 | Resolved: 2018-11-13

## 2018-11-13 PROBLEM — R11.2 INTRACTABLE NAUSEA AND VOMITING: Status: RESOLVED | Noted: 2018-07-15 | Resolved: 2018-11-13

## 2018-11-13 NOTE — PROGRESS NOTES
[address] [place] [address] and Primary Care  [place] [address]  [address] [place] [address]  Phone:  [phone]  Fax: [phone]       Chief Complaint   Patient presents with    Transitions Of Care     Patient admitted to [facility] on 10/28/18 for Sickle Cell Crisis. .      SUBJECTIVE:    Nicolle Padilla is a 66 y.o. female Comes in for return visit stating she has done well. She has had no further pain since being discharged from the hospital.  She continues her analgesics, which appear to be alleviating her pain to allow her to function. She complains of occasional swelling of the distal right lower extremity, but once she started taking her antihypertensive medication, which has a diuretic component in it, this abated. She is back on her antidepressant. She has no further GI symptoms also. She had a gastroenteritis initiating this admission. Current Outpatient Medications   Medication Sig Dispense Refill    HYDROcodone-acetaminophen (NORCO)  mg tablet Take 1 Tab by mouth every six (6) hours as needed. Max Daily Amount: 4 Tabs. 120 Tab 0    fentaNYL (DURAGESIC) 75 mcg/hr 1 Patch by TransDERmal route every seventy-two (72) hours. Max Daily Amount: 1 Patch. 10 Patch 0    ondansetron (ZOFRAN ODT) 4 mg disintegrating tablet Take 1 Tab by mouth every eight (8) hours as needed for Nausea. 10 Tab 0    promethazine (PHENERGAN) 25 mg suppository Insert 1 Suppository into rectum every six (6) hours as needed for Nausea. 12 Suppository 0    folic acid (FOLVITE) 1 mg tablet Take 1 Tab by mouth daily. 30 Tab 11    OMEPRAZOLE PO Take 20 mg by mouth daily.  cyclobenzaprine (FLEXERIL) 10 mg tablet Take 1 Tab by mouth three (3) times daily as needed for Muscle Spasm(s).  50 Tab 1    citalopram (CELEXA) 10 mg tablet TAKE 1 TABLET BY MOUTH EVERY NIGHT AT BEDTIME 90 Tab 3    losartan-hydroCHLOROthiazide (HYZAAR) 50-12.5 mg per tablet TAKE 1 TABLET BY MOUTH DAILY 90 Tab 3    fluocinoNIDE (LIDEX) 0.05 % topical cream two (2) times daily as needed.        Past Medical History:   Diagnosis Date    Anemia     SC hemoglobinopathy    Chronic pain     Depression     Hypertension     Liver disease     hepatitis C; pt reports \"cured\"    Sickle cell disease ([facility] [address]      Past Surgical History:   Procedure Laterality Date    BREAST SURGERY PROCEDURE UNLISTED      2 cysts removed from R breast    CHEST SURGERY PROCEDURE UNLISTED      status post thor for removal of a benign     HX BREAST BIOPSY Right 05/2007    negative    HX CHOLECYSTECTOMY      HX ORTHOPAEDIC      bony curettage of an ostial myelitis focus     Allergies   Allergen Reactions    Dilaudid [Hydromorphone (Bulk)] Itching     Can tolerate with benadryl and ondansetron    Percocet [Oxycodone-Acetaminophen] Itching         REVIEW OF SYSTEMS:  General: negative for - chills or fever  ENT: negative for - headaches, nasal congestion or tinnitus  Respiratory: negative for - cough, hemoptysis, shortness of breath or wheezing  Cardiovascular : negative for - chest pain, edema, palpitations or shortness of breath  Gastrointestinal: negative for - abdominal pain, blood in stools, heartburn or nausea/vomiting  Genito-Urinary: no dysuria, trouble voiding, or hematuria  Musculoskeletal: negative for - gait disturbance, joint pain, joint stiffness or joint swelling  Neurological: no TIA or stroke symptoms  Hematologic: no bruises, no bleeding, no swollen glands  Integument: no lumps, mole changes, nail changes or rash  Endocrine: no malaise/lethargy or unexpected weight changes      Social History     Socioeconomic History    Marital status:      Spouse name: Not on file    Number of children: 2    Years of education: Not on file    Highest education level: Not on file   Social Needs    Financial resource strain: Not on file    Food insecurity - worry: Not on file    Food insecurity - inability: Not on file   [employer] needs - medical: Not on file    Transportation needs - non-medical: Not on file   Occupational History    Occupation: disabled---Sickle cell disease   Tobacco Use    Smoking status: Never Smoker    Smokeless tobacco: Never Used   Substance and Sexual Activity    Alcohol use: No    Drug use: No    Sexual activity: Yes     Partners: Male   Other Topics Concern    Not on file   Social History Narrative    Not on file     Family History   Problem Relation Age of Onset    Hypertension Mother     Hypertension Father        OBJECTIVE:    Visit Vitals  /61   Pulse 99   Temp 98.6 °F (37 °C) (Oral)   Resp 16   Ht 5' 11\" (1.803 m)   Wt 197 lb 1.6 oz (89.4 kg)   SpO2 93%   BMI 27.49 kg/m²     CONSTITUTIONAL: well , well nourished, appears age appropriate  EYES: perrla, eom intact  ENMT:moist mucous membranes, pharynx clear  NECK: supple. Thyroid normal  RESPIRATORY: Chest: clear to ascultation and percussion   CARDIOVASCULAR: Heart: regular rate and rhythm  GASTROINTESTINAL: Abdomen: soft, bowel sounds active  HEMATOLOGIC: no pathological lymph nodes palpated  MUSCULOSKELETAL: Extremities: no edema, pulse 1+   INTEGUMENT: No unusual rashes or suspicious skin lesions noted. Nails appear normal.  NEUROLOGIC: non-focal exam   MENTAL STATUS: alert and oriented, appropriate affect      ASSESSMENT:  1. Sickle cell crisis ([address]    2. Essential hypertension    3. Gastroenteritis    4. Depression, unspecified depression type        PLAN:    1. Her sickle cell disease appears to be well controlled. She will continue her current dose of analgesics. 2. Her blood pressure is excellent today, no adjustments are made. 3. She will continue her antidepressant, which is quite effective. She remains quite upbeat. 4. She has no further GI symptoms. Follow-up Disposition:  Return in about 2 months (around 1/13/2019).       Mercedes Dasilva MD

## 2018-11-13 NOTE — PROGRESS NOTES
Chief Complaint   Patient presents with    Transitions Of Care     Patient admitted to [facility] on 10/28/18 for Sickle Cell Crisis. 1. Have you been to the ER, urgent care clinic since your last visit? Hospitalized since your last visit? Yes When: 10/28/18 Where: [facility] Reason for visit: Sickle Cell Crisis    2. Have you seen or consulted any other health care providers outside of the [facility] since your last visit? Include any pap smears or colon screening.  No

## 2018-12-03 NOTE — DISCHARGE SUMMARY
Ady Rodgers  MR#: [id number]  : 1960  ACCOUNT #: [de-identified]   ADMIT DATE: 10/28/2018  DISCHARGE DATE: 2018    HISTORY OF PRESENT ILLNESS:  The patient is a 46-year-old lady who was doing well up until the day of admission when she began noting recurrent episodes of nausea, vomiting and diarrhea. This was soon followed by pain in her shoulders, back and legs. She was evaluated and subsequently admitted for acute viral gastritis complicated by a sickle cell crisis. She has SC hemoglobinopathy. PAST MEDICAL HISTORY, SOCIAL HISTORY, REVIEW OF SYSTEMS, FAMILY HISTORY, PHYSICAL EXAMINATION:  As in admitting H and P.    LABORATORY VALUES:  The initial hemoglobin was 8.6, white count 8.5, MCV was 88.6, platelet count was 533 K with the following differential:  78 segs, 15 lymphocytes, 7 monocytes. White count at the time of discharge was 7.6 with a hemoglobin of 6.7. Abnormalities on the comprehensive profile revealed a CO2 of 20, BUN and creatinine 23 and 1.50 respectively with a bilirubin of 2.2, globulin 6.4. RADIOGRAPHS:  Chest x-ray bilateral humeral head sclerosis. HOSPITAL COURSE:  Patient was admitted and placed on parenteral fluids and initially made n.p.o. She was treated symptomatically for a GI disturbance thought to be a simple viral gastroenteritis. Over the next several days GI symptoms gradually improved to the point where she was receptive to a regular diet. She had no further GI symptoms the remaining of the hospital stay. The bulk of the stay, however, was spent taking care of her sickle cell crises. She was placed on Dilaudid and parenteral fluids and with this her pain gradually improved. At the time of discharge, she was back to her baseline. FINAL DIAGNOSES:  1. Viral gastroenteritis. 2.  Painful sickle crises. 3.  Sickle cell hemoglobinopathy. 4.  Primary hypertension.   5.  Status post cholecystectomy. 6.  Mild depression. DISPOSITION:  The patient may discharged home ambulatory on a regular diet. DISCHARGE MEDICATIONS:  Include the following Celexa 10 mg daily, Flexeril 10 mg p.o. q.i.d. p.r.n.,  Duragesic 75 mcg every 3 days,  Lidex p.r.n.,  folic acid 1 mg daily, hydrocodone 10/325 q. 6 hours p.r.n. losartan/HCTZ 50/12.5 q.a.m., omeprazole 20 mg daily, Zofran 4 mg q.8 hours p.r.n. PATIENT REMAINS FULL CODE. She will return to the office in the next 3-5 days. KAREL Stewart MD       LAB/SN  D: 12/02/2018 15:18     T: 12/03/2018 10:14  JOB #: [id number]

## 2018-12-05 DIAGNOSIS — D57.00 HB-SS DISEASE WITH CRISIS (HCC): ICD-10-CM

## 2018-12-05 RX ORDER — HYDROCODONE BITARTRATE AND ACETAMINOPHEN 10; 325 MG/1; MG/1
1 TABLET ORAL
Qty: 120 TAB | Refills: 0 | Status: SHIPPED | OUTPATIENT
Start: 2018-12-05 | End: 2019-02-07 | Stop reason: SDUPTHER

## 2018-12-06 ENCOUNTER — TELEPHONE (OUTPATIENT)
Dept: INTERNAL MEDICINE CLINIC | Age: 58
End: 2018-12-06

## 2018-12-12 ENCOUNTER — APPOINTMENT (OUTPATIENT)
Dept: GENERAL RADIOLOGY | Age: 58
DRG: 812 | End: 2018-12-12
Attending: INTERNAL MEDICINE
Payer: MEDICARE

## 2018-12-12 ENCOUNTER — HOSPITAL ENCOUNTER (INPATIENT)
Age: 58
LOS: 22 days | Discharge: HOME | DRG: 812 | End: 2019-01-03
Attending: EMERGENCY MEDICINE | Admitting: INTERNAL MEDICINE
Payer: MEDICARE

## 2018-12-12 DIAGNOSIS — D57.00 SICKLE CELL CRISIS (HCC): Primary | ICD-10-CM

## 2018-12-12 DIAGNOSIS — E87.6 ACUTE HYPOKALEMIA: ICD-10-CM

## 2018-12-12 DIAGNOSIS — E86.0 DEHYDRATION: ICD-10-CM

## 2018-12-12 DIAGNOSIS — N17.9 ACUTE KIDNEY INJURY (HCC): ICD-10-CM

## 2018-12-12 LAB
ALBUMIN SERPL-MCNC: 4 G/DL (ref 3.5–5)
ALBUMIN/GLOB SERPL: 0.7 {RATIO} (ref 1.1–2.2)
ALP SERPL-CCNC: 106 U/L (ref 45–117)
ALT SERPL-CCNC: 16 U/L (ref 12–78)
ANION GAP SERPL CALC-SCNC: 10 MMOL/L (ref 5–15)
APPEARANCE UR: CLEAR
AST SERPL-CCNC: 34 U/L (ref 15–37)
BACTERIA URNS QL MICRO: NEGATIVE /HPF
BILIRUB SERPL-MCNC: 2.3 MG/DL (ref 0.2–1)
BILIRUB UR QL: NEGATIVE
BUN SERPL-MCNC: 18 MG/DL (ref 6–20)
BUN/CREAT SERPL: 10 (ref 12–20)
CALCIUM SERPL-MCNC: 9.2 MG/DL (ref 8.5–10.1)
CHLORIDE SERPL-SCNC: 101 MMOL/L (ref 97–108)
CO2 SERPL-SCNC: 24 MMOL/L (ref 21–32)
COLOR UR: ABNORMAL
CREAT SERPL-MCNC: 1.8 MG/DL (ref 0.55–1.02)
EPITH CASTS URNS QL MICRO: ABNORMAL /LPF
ERYTHROCYTE [DISTWIDTH] IN BLOOD BY AUTOMATED COUNT: 15.9 % (ref 11.5–14.5)
GLOBULIN SER CALC-MCNC: 5.5 G/DL (ref 2–4)
GLUCOSE SERPL-MCNC: 148 MG/DL (ref 65–100)
GLUCOSE UR STRIP.AUTO-MCNC: NEGATIVE MG/DL
HCT VFR BLD AUTO: 24 % (ref 35–47)
HGB BLD-MCNC: 8.1 G/DL (ref 11.5–16)
HGB UR QL STRIP: NEGATIVE
KETONES UR QL STRIP.AUTO: NEGATIVE MG/DL
LEUKOCYTE ESTERASE UR QL STRIP.AUTO: ABNORMAL
MAGNESIUM SERPL-MCNC: 2.1 MG/DL (ref 1.6–2.4)
MCH RBC QN AUTO: 30.8 PG (ref 26–34)
MCHC RBC AUTO-ENTMCNC: 33.8 G/DL (ref 30–36.5)
MCV RBC AUTO: 91.3 FL (ref 80–99)
MUCOUS THREADS URNS QL MICRO: ABNORMAL /LPF
NITRITE UR QL STRIP.AUTO: NEGATIVE
NRBC # BLD: 0.04 K/UL (ref 0–0.01)
NRBC BLD-RTO: 0.8 PER 100 WBC
PH UR STRIP: 6 [PH] (ref 5–8)
PLATELET # BLD AUTO: 300 K/UL (ref 150–400)
PMV BLD AUTO: 11.2 FL (ref 8.9–12.9)
POTASSIUM SERPL-SCNC: 2.9 MMOL/L (ref 3.5–5.1)
PROT SERPL-MCNC: 9.5 G/DL (ref 6.4–8.2)
PROT UR STRIP-MCNC: NEGATIVE MG/DL
RBC # BLD AUTO: 2.63 M/UL (ref 3.8–5.2)
RBC #/AREA URNS HPF: ABNORMAL /HPF (ref 0–5)
RETICS # AUTO: 0.1 M/UL (ref 0.02–0.08)
RETICS/RBC NFR AUTO: 3.8 % (ref 0.7–2.1)
SODIUM SERPL-SCNC: 135 MMOL/L (ref 136–145)
SP GR UR REFRACTOMETRY: 1.01 (ref 1–1.03)
UA: UC IF INDICATED,UAUC: ABNORMAL
UROBILINOGEN UR QL STRIP.AUTO: 1 EU/DL (ref 0.2–1)
WBC # BLD AUTO: 5.1 K/UL (ref 3.6–11)
WBC URNS QL MICRO: ABNORMAL /HPF (ref 0–4)

## 2018-12-12 PROCEDURE — 85045 AUTOMATED RETICULOCYTE COUNT: CPT

## 2018-12-12 PROCEDURE — 85027 COMPLETE CBC AUTOMATED: CPT

## 2018-12-12 PROCEDURE — 96361 HYDRATE IV INFUSION ADD-ON: CPT

## 2018-12-12 PROCEDURE — 96374 THER/PROPH/DIAG INJ IV PUSH: CPT

## 2018-12-12 PROCEDURE — 99284 EMERGENCY DEPT VISIT MOD MDM: CPT

## 2018-12-12 PROCEDURE — 74011250636 HC RX REV CODE- 250/636: Performed by: INTERNAL MEDICINE

## 2018-12-12 PROCEDURE — 96376 TX/PRO/DX INJ SAME DRUG ADON: CPT

## 2018-12-12 PROCEDURE — 74011250637 HC RX REV CODE- 250/637: Performed by: EMERGENCY MEDICINE

## 2018-12-12 PROCEDURE — 65660000000 HC RM CCU STEPDOWN

## 2018-12-12 PROCEDURE — 36415 COLL VENOUS BLD VENIPUNCTURE: CPT

## 2018-12-12 PROCEDURE — 94761 N-INVAS EAR/PLS OXIMETRY MLT: CPT

## 2018-12-12 PROCEDURE — 65270000015 HC RM PRIVATE ONCOLOGY

## 2018-12-12 PROCEDURE — 83735 ASSAY OF MAGNESIUM: CPT

## 2018-12-12 PROCEDURE — 71045 X-RAY EXAM CHEST 1 VIEW: CPT

## 2018-12-12 PROCEDURE — 74011250636 HC RX REV CODE- 250/636: Performed by: EMERGENCY MEDICINE

## 2018-12-12 PROCEDURE — 96375 TX/PRO/DX INJ NEW DRUG ADDON: CPT

## 2018-12-12 PROCEDURE — 74011250637 HC RX REV CODE- 250/637: Performed by: INTERNAL MEDICINE

## 2018-12-12 PROCEDURE — 81001 URINALYSIS AUTO W/SCOPE: CPT

## 2018-12-12 PROCEDURE — 80053 COMPREHEN METABOLIC PANEL: CPT

## 2018-12-12 PROCEDURE — 93005 ELECTROCARDIOGRAM TRACING: CPT

## 2018-12-12 RX ORDER — KETOROLAC TROMETHAMINE 30 MG/ML
15 INJECTION, SOLUTION INTRAMUSCULAR; INTRAVENOUS
Status: COMPLETED | OUTPATIENT
Start: 2018-12-12 | End: 2018-12-12

## 2018-12-12 RX ORDER — HYDROCODONE BITARTRATE AND ACETAMINOPHEN 10; 325 MG/1; MG/1
1 TABLET ORAL
Status: DISCONTINUED | OUTPATIENT
Start: 2018-12-12 | End: 2019-01-03 | Stop reason: HOSPADM

## 2018-12-12 RX ORDER — FENTANYL 75 UG/H
1 PATCH TRANSDERMAL
Status: DISCONTINUED | OUTPATIENT
Start: 2018-12-12 | End: 2019-01-03 | Stop reason: HOSPADM

## 2018-12-12 RX ORDER — DIPHENHYDRAMINE HYDROCHLORIDE 50 MG/ML
25 INJECTION, SOLUTION INTRAMUSCULAR; INTRAVENOUS
Status: COMPLETED | OUTPATIENT
Start: 2018-12-12 | End: 2018-12-12

## 2018-12-12 RX ORDER — FENTANYL CITRATE 50 UG/ML
100 INJECTION, SOLUTION INTRAMUSCULAR; INTRAVENOUS
Status: COMPLETED | OUTPATIENT
Start: 2018-12-12 | End: 2018-12-12

## 2018-12-12 RX ORDER — POTASSIUM CHLORIDE 20 MEQ/1
40 TABLET, EXTENDED RELEASE ORAL
Status: COMPLETED | OUTPATIENT
Start: 2018-12-12 | End: 2018-12-12

## 2018-12-12 RX ORDER — ONDANSETRON 2 MG/ML
4 INJECTION INTRAMUSCULAR; INTRAVENOUS
Status: COMPLETED | OUTPATIENT
Start: 2018-12-12 | End: 2018-12-12

## 2018-12-12 RX ORDER — SODIUM CHLORIDE 0.9 % (FLUSH) 0.9 %
5-10 SYRINGE (ML) INJECTION EVERY 8 HOURS
Status: DISCONTINUED | OUTPATIENT
Start: 2018-12-12 | End: 2018-12-24 | Stop reason: SDUPTHER

## 2018-12-12 RX ORDER — SODIUM CHLORIDE 0.9 % (FLUSH) 0.9 %
5-10 SYRINGE (ML) INJECTION AS NEEDED
Status: DISCONTINUED | OUTPATIENT
Start: 2018-12-12 | End: 2018-12-24 | Stop reason: SDUPTHER

## 2018-12-12 RX ORDER — DIPHENHYDRAMINE HYDROCHLORIDE 50 MG/ML
50 INJECTION, SOLUTION INTRAMUSCULAR; INTRAVENOUS
Status: COMPLETED | OUTPATIENT
Start: 2018-12-12 | End: 2018-12-12

## 2018-12-12 RX ORDER — POLYETHYLENE GLYCOL 3350 17 G/17G
17 POWDER, FOR SOLUTION ORAL
Status: DISCONTINUED | OUTPATIENT
Start: 2018-12-12 | End: 2019-01-03 | Stop reason: HOSPADM

## 2018-12-12 RX ORDER — PANTOPRAZOLE SODIUM 40 MG/1
40 TABLET, DELAYED RELEASE ORAL DAILY
Status: DISCONTINUED | OUTPATIENT
Start: 2018-12-13 | End: 2019-01-03 | Stop reason: HOSPADM

## 2018-12-12 RX ORDER — DOCUSATE SODIUM 100 MG/1
100 CAPSULE, LIQUID FILLED ORAL
Status: DISCONTINUED | OUTPATIENT
Start: 2018-12-12 | End: 2019-01-03 | Stop reason: HOSPADM

## 2018-12-12 RX ORDER — ACETAMINOPHEN 325 MG/1
650 TABLET ORAL
Status: DISCONTINUED | OUTPATIENT
Start: 2018-12-12 | End: 2019-01-03 | Stop reason: HOSPADM

## 2018-12-12 RX ORDER — CITALOPRAM 20 MG/1
10 TABLET, FILM COATED ORAL EVERY EVENING
Status: DISCONTINUED | OUTPATIENT
Start: 2018-12-12 | End: 2019-01-03 | Stop reason: HOSPADM

## 2018-12-12 RX ORDER — ENOXAPARIN SODIUM 100 MG/ML
40 INJECTION SUBCUTANEOUS EVERY 24 HOURS
Status: DISCONTINUED | OUTPATIENT
Start: 2018-12-13 | End: 2019-01-03 | Stop reason: HOSPADM

## 2018-12-12 RX ORDER — ONDANSETRON 2 MG/ML
4 INJECTION INTRAMUSCULAR; INTRAVENOUS
Status: DISCONTINUED | OUTPATIENT
Start: 2018-12-12 | End: 2019-01-03 | Stop reason: HOSPADM

## 2018-12-12 RX ORDER — HEPARIN SODIUM 5000 [USP'U]/ML
5000 INJECTION, SOLUTION INTRAVENOUS; SUBCUTANEOUS EVERY 8 HOURS
Status: DISCONTINUED | OUTPATIENT
Start: 2018-12-12 | End: 2018-12-12

## 2018-12-12 RX ORDER — FENTANYL CITRATE 50 UG/ML
50 INJECTION, SOLUTION INTRAMUSCULAR; INTRAVENOUS
Status: DISCONTINUED | OUTPATIENT
Start: 2018-12-12 | End: 2018-12-12

## 2018-12-12 RX ORDER — FOLIC ACID 1 MG/1
1 TABLET ORAL DAILY
Status: DISCONTINUED | OUTPATIENT
Start: 2018-12-13 | End: 2019-01-03 | Stop reason: HOSPADM

## 2018-12-12 RX ORDER — HYDROMORPHONE HYDROCHLORIDE 1 MG/ML
1 INJECTION, SOLUTION INTRAMUSCULAR; INTRAVENOUS; SUBCUTANEOUS
Status: DISCONTINUED | OUTPATIENT
Start: 2018-12-12 | End: 2018-12-22

## 2018-12-12 RX ORDER — CYCLOBENZAPRINE HCL 10 MG
10 TABLET ORAL
Status: DISCONTINUED | OUTPATIENT
Start: 2018-12-12 | End: 2019-01-03 | Stop reason: HOSPADM

## 2018-12-12 RX ORDER — SODIUM CHLORIDE 9 MG/ML
100 INJECTION, SOLUTION INTRAVENOUS CONTINUOUS
Status: DISCONTINUED | OUTPATIENT
Start: 2018-12-12 | End: 2018-12-13

## 2018-12-12 RX ORDER — HYDROMORPHONE HYDROCHLORIDE 1 MG/ML
1 INJECTION, SOLUTION INTRAMUSCULAR; INTRAVENOUS; SUBCUTANEOUS ONCE
Status: COMPLETED | OUTPATIENT
Start: 2018-12-12 | End: 2018-12-12

## 2018-12-12 RX ADMIN — ONDANSETRON 4 MG: 2 INJECTION INTRAMUSCULAR; INTRAVENOUS at 13:59

## 2018-12-12 RX ADMIN — POTASSIUM CHLORIDE 40 MEQ: 20 TABLET, EXTENDED RELEASE ORAL at 12:10

## 2018-12-12 RX ADMIN — SODIUM CHLORIDE 100 ML/HR: 900 INJECTION, SOLUTION INTRAVENOUS at 17:12

## 2018-12-12 RX ADMIN — CITALOPRAM HYDROBROMIDE 10 MG: 20 TABLET ORAL at 17:20

## 2018-12-12 RX ADMIN — SODIUM CHLORIDE 1000 ML: 900 INJECTION, SOLUTION INTRAVENOUS at 11:11

## 2018-12-12 RX ADMIN — FENTANYL CITRATE 100 MCG: 50 INJECTION, SOLUTION INTRAMUSCULAR; INTRAVENOUS at 12:10

## 2018-12-12 RX ADMIN — HYDROMORPHONE HYDROCHLORIDE 1 MG: 1 INJECTION, SOLUTION INTRAMUSCULAR; INTRAVENOUS; SUBCUTANEOUS at 13:59

## 2018-12-12 RX ADMIN — Medication 10 ML: at 22:29

## 2018-12-12 RX ADMIN — HYDROCODONE BITARTRATE AND ACETAMINOPHEN 1 TABLET: 10; 325 TABLET ORAL at 20:51

## 2018-12-12 RX ADMIN — HEPARIN SODIUM 5000 UNITS: 5000 INJECTION INTRAVENOUS; SUBCUTANEOUS at 16:07

## 2018-12-12 RX ADMIN — DIPHENHYDRAMINE HYDROCHLORIDE 25 MG: 50 INJECTION, SOLUTION INTRAMUSCULAR; INTRAVENOUS at 13:59

## 2018-12-12 RX ADMIN — HYDROMORPHONE HYDROCHLORIDE 1 MG: 1 INJECTION, SOLUTION INTRAMUSCULAR; INTRAVENOUS; SUBCUTANEOUS at 11:03

## 2018-12-12 RX ADMIN — DIPHENHYDRAMINE HYDROCHLORIDE 50 MG: 50 INJECTION, SOLUTION INTRAMUSCULAR; INTRAVENOUS at 11:04

## 2018-12-12 RX ADMIN — Medication 10 ML: at 17:15

## 2018-12-12 RX ADMIN — ONDANSETRON 4 MG: 2 INJECTION INTRAMUSCULAR; INTRAVENOUS at 22:29

## 2018-12-12 RX ADMIN — HYDROMORPHONE HYDROCHLORIDE 1 MG: 1 INJECTION, SOLUTION INTRAMUSCULAR; INTRAVENOUS; SUBCUTANEOUS at 22:29

## 2018-12-12 RX ADMIN — HYDROMORPHONE HYDROCHLORIDE 1 MG: 1 INJECTION, SOLUTION INTRAMUSCULAR; INTRAVENOUS; SUBCUTANEOUS at 17:20

## 2018-12-12 RX ADMIN — ONDANSETRON 4 MG: 2 INJECTION INTRAMUSCULAR; INTRAVENOUS at 11:04

## 2018-12-12 RX ADMIN — KETOROLAC TROMETHAMINE 15 MG: 30 INJECTION, SOLUTION INTRAMUSCULAR at 11:03

## 2018-12-12 NOTE — PROGRESS NOTES
Problem: Falls - Risk of  Goal: *Absence of Falls  Document Mukund Fall Risk and appropriate interventions in the flowsheet.   Outcome: Progressing Towards Goal  Fall Risk Interventions:            Medication Interventions: Evaluate medications/consider consulting pharmacy, Patient to call before getting OOB, Teach patient to arise slowly

## 2018-12-12 NOTE — H&P
Hospitalist Admission Note    NAME: Alejandra Snell   :  1960   MRN:  [id number]     Date/Time:  2018 2:56 PM    Patient PCP: Mindi Bland MD  ________________________________________________________________________    My assessment of this patient's clinical condition and my plan of care is as follows. Assessment / Plan:  Acute sickle cell crisis  Hemoglobin is closer to her baseline of around 8 and currently it is 8.1  Reticulocyte count is about 3.8  Check a stat chest x-ray  Continue fentanyl patch, Norco and start IV Dilaudid for pain control  Start IV fluids with normal saline at 100 mL/h  Repeat CBC in a.m. Consult her oncologist Dr. Marisela Villalta home folic acid    Acute kidney injury likely prerenal and related to medications (losartan)  Hypokalemia  Baseline creatinine is around 0.6 - 0.9 and currently creatinine is elevated to 1.8  Hold losartan and hydrochlorothiazide  Check urine analysis and repeat BMP in a.m.  K replaced and recheck in am     Hypertension controlled  Currently blood pressure is borderline with systolic around 556  Hold lisinopril and hydrochlorothiazide  Continue IV fluids    History of gastroesophageal reflux disease  Resume home omeprazole    Depression  Resume Celexa                Code Status: Full   Surrogate Decision Maker: [facility]    DVT Prophylaxis: heparin  GI Prophylaxis: not indicated    Baseline: From home independent        Subjective:   CHIEF COMPLAINT: Pain in both knees, ankles and abdomen.     HISTORY OF PRESENT ILLNESS:     This a 82-year-old female with past medical history of sickle cell anemia, hypertension, gastroesophageal reflux disease is coming to the hospital with chief complaints of pain in both ankles, knees and also lower abdominal pain.  Patient reports being in her usual state of health until about 2 days ago. Sofia Vargas reports she was spending a lot of time in the cold and since then started having pain which is mostly in the joints and also in the lower abdominal area.  Does not report any nausea, vomiting or diarrhea.  Denies chest pain, shortness of breath or palpitations.  Denies headache, weakness, tingling or numbness. On arrival to the hospital her vital signs were within normal limits.  On lab work she was noted to have a hemoglobin of 8.1, reticulocyte count of 3.8.  BMP showed a potassium of 2.9 and creatinine of 1.80.  Chest x-ray is pending at this time. Claudia Kelsey was given IV fluids in the emergency department and also Dilaudid. We were asked to admit for work up and evaluation of the above problems. Past Medical History:   Diagnosis Date    Anemia     SC hemoglobinopathy    Chronic pain     Depression     Hypertension     Liver disease     hepatitis C; pt reports \"cured\"    Sickle cell disease ([facility] [address]         Past Surgical History:   Procedure Laterality Date    BREAST SURGERY PROCEDURE UNLISTED      2 cysts removed from R breast    CHEST SURGERY PROCEDURE UNLISTED      status post thor for removal of a benign     HX BREAST BIOPSY Right 05/2007    negative    HX CHOLECYSTECTOMY      HX ORTHOPAEDIC      bony curettage of an ostial myelitis focus       Social History     Tobacco Use    Smoking status: Never Smoker    Smokeless tobacco: Never Used   Substance Use Topics    Alcohol use: No        Family History   Problem Relation Age of Onset    Hypertension Mother     Hypertension Father      Allergies   Allergen Reactions    Dilaudid [Hydromorphone (Bulk)] Itching     Can tolerate with benadryl and ondansetron    Percocet [Oxycodone-Acetaminophen] Itching        Prior to Admission medications    Medication Sig Start Date End Date Taking? Authorizing Provider   HYDROcodone-acetaminophen (NORCO)  mg tablet Take 1 Tab by mouth every six (6) hours as needed. Max Daily Amount: 4 Tabs.  12/5/18   Cory Wilson MD   fentaNYL (DURAGESIC) 75 mcg/hr 1 Patch by TransDERmal route every seventy-two (72) hours. Max Daily Amount: 1 Patch. 10/23/18   Trino Collins MD   ondansetron (ZOFRAN ODT) 4 mg disintegrating tablet Take 1 Tab by mouth every eight (8) hours as needed for Nausea. 7/15/18   Jose Mak MD   promethazine (PHENERGAN) 25 mg suppository Insert 1 Suppository into rectum every six (6) hours as needed for Nausea. 7/15/18   Jose Mak MD   folic acid (FOLVITE) 1 mg tablet Take 1 Tab by mouth daily. 6/12/18   Kat Bergeron MD   OMEPRAZOLE PO Take 20 mg by mouth daily. German, MD Ean   cyclobenzaprine (FLEXERIL) 10 mg tablet Take 1 Tab by mouth three (3) times daily as needed for Muscle Spasm(s). 11/28/17   Kat Bergeron MD   citalopram (CELEXA) 10 mg tablet TAKE 1 TABLET BY MOUTH EVERY NIGHT AT BEDTIME 11/28/17   Kat Bergeron MD   losartan-hydroCHLOROthiazide [facility]) 50-12.5 mg per tablet TAKE 1 TABLET BY MOUTH DAILY 11/16/17   Kat Bergeron MD   fluocinoNIDE (LIDEX) 0.05 % topical cream two (2) times daily as needed. 8/1/14   Provider, Historical       REVIEW OF SYSTEMS:     I am not able to complete the review of systems because:    The patient is intubated and sedated    The patient has altered mental status due to his acute medical problems    The patient has baseline aphasia from prior stroke(s)    The patient has baseline dementia and is not reliable historian    The patient is in acute medical distress and unable to provide information           Total of 12 systems reviewed as follows:       POSITIVE= underlined text  Negative = text not underlined  General:  fever, chills, sweats, generalized weakness, weight loss/gain,      loss of appetite   Eyes:    blurred vision, eye pain, loss of vision, double vision  ENT:    rhinorrhea, pharyngitis   Respiratory:   cough, sputum production, SOB, MARS, wheezing, pleuritic pain   Cardiology:   chest pain, palpitations, orthopnea, PND, edema, syncope   Gastrointestinal:  abdominal pain , N/V, diarrhea, dysphagia, constipation, bleeding   Genitourinary:  frequency, urgency, dysuria, hematuria, incontinence   Muskuloskeletal :  arthralgia, myalgia, back pain  Hematology:  easy bruising, nose or gum bleeding, lymphadenopathy   Dermatological: rash, ulceration, pruritis, color change / jaundice  Endocrine:   hot flashes or polydipsia   Neurological:  headache, dizziness, confusion, focal weakness, paresthesia,     Speech difficulties, memory loss, gait difficulty  Psychological: Feelings of anxiety, depression, agitation    Objective:   VITALS:    Visit Vitals  /64   Pulse (!) 107   Temp 98.6 °F (37 °C)   Resp 16   Ht 5' 11\" (1.803 m)   Wt 77 kg (169 lb 12.1 oz)   SpO2 100%   BMI 23.68 kg/m²       PHYSICAL EXAM:    General:    no distress, appears stated age. HEENT: Atraumatic, anicteric sclerae, pink conjunctivae     No oral ulcers, mucosa moist  Neck:  Supple, symmetrical,  thyroid: non tender  Lungs:   Clear to auscultation bilaterally. No Wheezing or Rhonchi. No rales. Chest wall:  No tenderness  No Accessory muscle use. Heart:   Regular  rhythm,  No  murmur   No edema  Abdomen:   Soft, non-tender. Not distended. Bowel sounds normal  Extremities: No cyanosis. No clubbing,      Skin turgor normal, Capillary refill normal, Radial dial pulse 2+  Skin:     Not pale. Not Jaundiced  No rashes   Psych:  Not anxious or agitated. Neurologic: EOMs intact. No facial asymmetry. No aphasia or slurred speech. Symmetrical strength, Sensation grossly intact.  Alert and oriented X 4.     _______________________________________________________________________  Care Plan discussed with:    Comments   Patient y    Family      RN y    Care Manager                    Consultant:      _______________________________________________________________________  Expected  Disposition:   Home with Family y   HH/PT/OT/RN    SNF/LTC    JEREMY    ________________________________________________________________________  TOTAL TIME: 920 [employer] Provided     Minutes non procedure based      Comments    y Reviewed previous records   >50% of visit spent in counseling and coordination of care y Discussion with patient and/or family and questions answered       ________________________________________________________________________  Signed: Nicole Contreras MD    Procedures: see electronic medical records for all procedures/Xrays and details which were not copied into this note but were reviewed prior to creation of Plan. LAB DATA REVIEWED:    Recent Results (from the past 24 hour(s))   CBC W/O DIFF    Collection Time: 12/12/18 10:13 AM   Result Value Ref Range    WBC 5.1 3.6 - 11.0 K/uL    RBC 2.63 (L) 3.80 - 5.20 M/uL    HGB 8.1 (L) 11.5 - 16.0 g/dL    HCT 24.0 (L) 35.0 - 47.0 %    MCV 91.3 80.0 - 99.0 FL    MCH 30.8 26.0 - 34.0 PG    MCHC 33.8 30.0 - 36.5 g/dL    RDW 15.9 (H) 11.5 - 14.5 %    PLATELET 593 245 - 997 K/uL    MPV 11.2 8.9 - 12.9 FL    NRBC 0.8 (H) 0  WBC    ABSOLUTE NRBC 0.04 (H) 0.00 - 1.56 K/uL   METABOLIC PANEL, COMPREHENSIVE    Collection Time: 12/12/18 10:13 AM   Result Value Ref Range    Sodium 135 (L) 136 - 145 mmol/L    Potassium 2.9 (L) 3.5 - 5.1 mmol/L    Chloride 101 97 - 108 mmol/L    CO2 24 21 - 32 mmol/L    Anion gap 10 5 - 15 mmol/L    Glucose 148 (H) 65 - 100 mg/dL    BUN 18 6 - 20 MG/DL    Creatinine 1.80 (H) 0.55 - 1.02 MG/DL    BUN/Creatinine ratio 10 (L) 12 - 20      GFR est AA 35 (L) >60 ml/min/1.73m2    GFR est non-AA 29 (L) >60 ml/min/1.73m2    Calcium 9.2 8.5 - 10.1 MG/DL    Bilirubin, total 2.3 (H) 0.2 - 1.0 MG/DL    ALT (SGPT) 16 12 - 78 U/L    AST (SGOT) 34 15 - 37 U/L    Alk.  phosphatase 106 45 - 117 U/L    Protein, total 9.5 (H) 6.4 - 8.2 g/dL    Albumin 4.0 3.5 - 5.0 g/dL    Globulin 5.5 (H) 2.0 - 4.0 g/dL    A-G Ratio 0.7 (L) 1.1 - 2.2     MAGNESIUM    Collection Time: 12/12/18 10:13 AM   Result Value Ref Range    Magnesium 2.1 1.6 - 2.4 mg/dL   RETICULOCYTE COUNT    Collection Time: 12/12/18 10:16 AM   Result Value Ref Range    Reticulocyte count 3.8 (H) 0.7 - 2.1 %    Absolute Retic Cnt. 0.0984 (H) 0.0164 - 0.0776 M/ul

## 2018-12-12 NOTE — ED NOTES
Pt resting in bed lying on her left side. Pt states she is having sharp pains in her joints that began yesterday. Pt's brother is at bedside.

## 2018-12-12 NOTE — ED PROVIDER NOTES
EMERGENCY DEPARTMENT HISTORY AND PHYSICAL EXAM      Date: 12/12/2018  Patient Name: Dangelo Braswell  History of Presenting Illness     Chief Complaint   Patient presents with    Sickle Cell Crisis     pt reports pain in joints beginning yesterday, sickle cell crisis       History Provided By: Patient    HPI: Dangelo Braswell, 66 y.o. female with PMHx significant for sickle cell disease, HTN, depression, presents to the ED with cc of 9/10 generalized joint pain, specifically bilateral ankles, knees, and right hip x yesterday. Pt states pain feels like prior sickle cell crises. She endorses using a Fentanyl patch with no allevaition of pain. Pt expresses her last sickle cell crisis was 1 month ago and she was evaluated here. She states she sees Dr. Demarcus Burr for her sickle cell management. Additionally, pt specifically denies any recent fever, chills, headache, nausea, vomiting, diarrhea, abdominal pain, CP, SOB, lightheadedness, dizziness, numbness, weakness, tingling, BLE swelling, heart palpitations, urinary sxs, changes in BM, changes in PO intake, melena, hematochezia, cough, or congestion. PCP: Chano Atkins MD      There are no other complaints, changes or physical findings at this time.      Past History     Past Medical History:  Past Medical History:   Diagnosis Date    Anemia     SC hemoglobinopathy    Chronic pain     Depression     Hypertension     Liver disease     hepatitis C; pt reports \"cured\"    Sickle cell disease ([facility] [address]        Past Surgical History:  Past Surgical History:   Procedure Laterality Date    BREAST SURGERY PROCEDURE UNLISTED      2 cysts removed from R breast    CHEST SURGERY PROCEDURE UNLISTED      status post thor for removal of a benign     HX BREAST BIOPSY Right 05/2007    negative    HX CHOLECYSTECTOMY      HX ORTHOPAEDIC      bony curettage of an ostial myelitis focus       Family History:  Family History   Problem Relation Age of Onset    Hypertension Mother    Lisa Chan Hypertension Father        Social History:  Social History     Tobacco Use    Smoking status: Never Smoker    Smokeless tobacco: Never Used   Substance Use Topics    Alcohol use: No    Drug use: No       Allergies: Allergies   Allergen Reactions    Dilaudid [Hydromorphone (Bulk)] Itching     Can tolerate with benadryl and ondansetron    Percocet [Oxycodone-Acetaminophen] Itching         Review of Systems   Review of Systems   Constitutional: Negative. Negative for chills and fever. HENT: Negative. Negative for congestion, facial swelling, rhinorrhea, sore throat, trouble swallowing and voice change. Eyes: Negative. Respiratory: Negative. Negative for apnea, cough, chest tightness, shortness of breath and wheezing. Cardiovascular: Negative. Negative for chest pain, palpitations and leg swelling. Gastrointestinal: Negative. Negative for abdominal distention, abdominal pain, blood in stool, constipation, diarrhea, nausea and vomiting. Endocrine: Negative. Negative for cold intolerance, heat intolerance and polyuria. Genitourinary: Negative. Negative for difficulty urinating, dysuria, flank pain, frequency, hematuria and urgency. Musculoskeletal: Positive for arthralgias (bilateral ankles, knees, right hip). Negative for back pain, myalgias, neck pain and neck stiffness. Skin: Negative. Negative for color change and rash. Neurological: Negative. Negative for dizziness, syncope, facial asymmetry, speech difficulty, weakness, light-headedness, numbness and headaches. Hematological: Negative. Does not bruise/bleed easily. Psychiatric/Behavioral: Negative. Negative for confusion and self-injury. The patient is not nervous/anxious. Physical Exam   Physical Exam   Constitutional: She is oriented to person, place, and time. She appears well-developed and well-nourished. No distress. HENT:   Head: Normocephalic and atraumatic.    Mouth/Throat: Oropharynx is clear and moist. No oropharyngeal exudate. Eyes: Conjunctivae and EOM are normal. Pupils are equal, round, and reactive to light. Neck: Normal range of motion. Cardiovascular: Normal rate, regular rhythm and normal heart sounds. Exam reveals no gallop and no friction rub. No murmur heard. Pulmonary/Chest: Effort normal and breath sounds normal. No respiratory distress. She has no wheezes. She has no rales. She exhibits no tenderness. Abdominal: Soft. Bowel sounds are normal. She exhibits no distension and no mass. There is no tenderness. There is no rebound and no guarding. Musculoskeletal: Normal range of motion. She exhibits no edema or deformity. Diffuse joint pain. No swelling. Neurological: She is alert and oriented to person, place, and time. She displays normal reflexes. No cranial nerve deficit. She exhibits normal muscle tone. Coordination normal.   Skin: Skin is warm. No rash noted. She is not diaphoretic. Psychiatric: She has a normal mood and affect. Nursing note and vitals reviewed. Diagnostic Study Results     Labs -  Recent Results (from the past 24 hour(s))   CBC WITH AUTOMATED DIFF    Collection Time: 12/13/18 10:27 AM   Result Value Ref Range    WBC 8.7 3.6 - 11.0 K/uL    RBC 2.08 (L) 3.80 - 5.20 M/uL    HGB 6.5 (L) 11.5 - 16.0 g/dL    HCT 19.0 (L) 35.0 - 47.0 %    MCV 91.3 80.0 - 99.0 FL    MCH 31.3 26.0 - 34.0 PG    MCHC 34.2 30.0 - 36.5 g/dL    RDW 15.6 (H) 11.5 - 14.5 %    PLATELET 543 715 - 463 K/uL    MPV 11.0 8.9 - 12.9 FL    NRBC 0.3 (H) 0  WBC    ABSOLUTE NRBC 0.03 (H) 0.00 - 0.01 K/uL    NEUTROPHILS 21 (L) 32 - 75 %    LYMPHOCYTES 64 (H) 12 - 49 %    MONOCYTES 11 5 - 13 %    EOSINOPHILS 4 0 - 7 %    BASOPHILS 0 0 - 1 %    IMMATURE GRANULOCYTES 0 0.0 - 0.5 %    ABS. NEUTROPHILS 1.8 1.8 - 8.0 K/UL    ABS. LYMPHOCYTES 5.5 (H) 0.8 - 3.5 K/UL    ABS. MONOCYTES 1.0 0.0 - 1.0 K/UL    ABS. EOSINOPHILS 0.4 0.0 - 0.4 K/UL    ABS. BASOPHILS 0.0 0.0 - 0.1 K/UL    ABS. IMM.  GRANS. 0.0 0.00 - 0.04 K/UL    DF AUTOMATED     METABOLIC PANEL, BASIC    Collection Time: 12/13/18 10:27 AM   Result Value Ref Range    Sodium 135 (L) 136 - 145 mmol/L    Potassium 2.8 (L) 3.5 - 5.1 mmol/L    Chloride 104 97 - 108 mmol/L    CO2 24 21 - 32 mmol/L    Anion gap 7 5 - 15 mmol/L    Glucose 118 (H) 65 - 100 mg/dL    BUN 11 6 - 20 MG/DL    Creatinine 1.21 (H) 0.55 - 1.02 MG/DL    BUN/Creatinine ratio 9 (L) 12 - 20      GFR est AA 56 (L) >60 ml/min/1.73m2    GFR est non-AA 46 (L) >60 ml/min/1.73m2    Calcium 8.3 (L) 8.5 - 10.1 MG/DL   C REACTIVE PROTEIN, QT    Collection Time: 12/13/18 10:27 AM   Result Value Ref Range    C-Reactive protein <0.29 0.00 - 0.60 mg/dL         Medical Decision Making   I am the first provider for this patient. I reviewed the vital signs, available nursing notes, past medical history, past surgical history, family history and social history. Vital Signs-Reviewed the patient's vital signs. Patient Vitals for the past 24 hrs:   Temp Pulse Resp BP SpO2   12/13/18 2001    98/54    12/13/18 1950 98.7 °F (37.1 °C) 80 17 (!) 89/43 99 %   12/13/18 1619 98.8 °F (37.1 °C) 80 16 103/56 100 %   12/13/18 1100 98.2 °F (36.8 °C) 75 16 109/61 100 %   12/13/18 0800 98.3 °F (36.8 °C) 68 16 109/56 100 %   12/13/18 0323   16 116/62 100 %   12/13/18 0254 98.2 °F (36.8 °C) 72 16 93/42 99 %   12/12/18 2340 98.9 °F (37.2 °C) 73 18 100/49 98 %       Records Reviewed: Nursing Notes, Old Medical Records, Previous electrocardiograms, Previous Radiology Studies and Previous Laboratory Studies    Provider Notes (Medical Decision Making):   DDx: sickle cell crisis, aplastic anemia, electrolyte disturbance, ANASTASIIA    66 y.o. F with h/o SCD presents with diffuse joint poin typical of her sickle cell pain; will check labs including retic count; pt without chest pain or SOB, no hypoxia and thus no concern for acute chest; will provide fluids, pain control and monitor. ED Course:   Initial assessment performed. The patients presenting problems have been discussed, and they are in agreement with the care plan formulated and outlined with them. I have encouraged them to ask questions as they arise throughout their visit. I reviewed our electronic medical record system for any past medical records that were available that may contribute to the patient's current condition, the nursing notes and vital signs from today's visit.   Louann Gottron, MD    Medications Administered During ED Course[de-identified]  Medications   HYDROmorphone (PF) (DILAUDID) injection 1 mg (1 mg IntraVENous Given 12/13/18 1747)   citalopram (CELEXA) tablet 10 mg (10 mg Oral Given 12/13/18 1751)   cyclobenzaprine (FLEXERIL) tablet 10 mg (not administered)   fentaNYL (DURAGESIC) 75 mcg/hr patch 1 Patch (1 Patch TransDERmal Apply Patch 04/50/54 0743)   folic acid (FOLVITE) tablet 1 mg (1 mg Oral Given 12/13/18 0833)   HYDROcodone-acetaminophen (NORCO)  mg tablet 1 Tab (1 Tab Oral Given 12/13/18 2036)   pantoprazole (PROTONIX) tablet 40 mg (40 mg Oral Given 12/13/18 0833)   sodium chloride (NS) flush 5-10 mL (10 mL IntraVENous Given 12/13/18 2126)   sodium chloride (NS) flush 5-10 mL (not administered)   acetaminophen (TYLENOL) tablet 650 mg (not administered)   ondansetron (ZOFRAN) injection 4 mg (4 mg IntraVENous Given 12/13/18 2149)   docusate sodium (COLACE) capsule 100 mg (100 mg Oral Given 12/13/18 1437)   enoxaparin (LOVENOX) injection 40 mg (40 mg SubCUTAneous Given 12/13/18 0834)   polyethylene glycol (MIRALAX) packet 17 g (not administered)   diphenhydrAMINE (BENADRYL) injection 25 mg (25 mg IntraVENous Given 12/13/18 2150)   dextrose 5% - 0.9% NaCl with KCl 40 mEq/L infusion ( IntraVENous New Bag 12/13/18 2125)   ketorolac (TORADOL) injection 15 mg (15 mg IntraVENous Given 12/12/18 1103)   sodium chloride 0.9 % bolus infusion 1,000 mL (0 mL IntraVENous IV Completed 12/12/18 1553)   HYDROmorphone (PF) (DILAUDID) injection 1 mg (1 mg IntraVENous Given 12/12/18 1103)   diphenhydrAMINE (BENADRYL) injection 50 mg (50 mg IntraVENous Given 12/12/18 1104)   ondansetron (ZOFRAN) injection 4 mg (4 mg IntraVENous Given 12/12/18 1104)   fentaNYL citrate (PF) injection 100 mcg (100 mcg IntraVENous Given 12/12/18 1210)   potassium chloride (K-DUR, KLOR-CON) SR tablet 40 mEq (40 mEq Oral Given 12/12/18 1210)   diphenhydrAMINE (BENADRYL) injection 25 mg (25 mg IntraVENous Given 12/12/18 1359)   ondansetron (ZOFRAN) injection 4 mg (4 mg IntraVENous Given 12/12/18 1359)       Progress Note:  ED Course as of Dec 13 2216   Wed Dec 12, 2018   1008 Per chart review, [facility] [address] sick[facility] requiring Dilaudid PCA. [HW]   1147 Pt c/o 8/10 pain; evidence of ANASTASIIA on labs; will continue fluid hydration, replete K, and continue IV pain medication therapy. [HW]      ED Course User Index  [HW] Jovita Miller MD     HYPERTENSION COUNSELING  Education was provided to the patient today regarding their hypertension. Patient is made aware of their elevated blood pressure and is instructed to follow up this week with their Primary Care for a recheck. Patient is counseled regarding consequences of chronic, uncontrolled hypertension including kidney disease, heart disease, stroke or even death. Patient states their understanding and agrees to follow up this week. Additionally, during their visit, I discussed sodium restriction, maintaining ideal body weight and regular exercise program as physiologic means to achieve blood pressure control. The patient will strive towards this. Consult Note:  1:34 PM  Teryl Saint, MD spoke with Sofiya Kendall MD  Specialty: Hospitalist  Discussed pt's hx, disposition, and available diagnostic and imaging results. Dr. Joni Neil will admit pt. Disposition:  Admit Note:  1:40 PM  Pt is being admitted by Dr. Joni Neil. The results of their tests and reason(s) for their admission have been discussed with pt and/or available family.  They convey agreement and understanding for the need to be admitted and for admission diagnosis. Diagnosis     Clinical Impression:   1. Sickle cell crisis ([facility] [address]    2. Acute kidney injury ([facility] [address]    3. Dehydration    4. Acute hypokalemia        Attestations  This note is prepared by Reyna Garcia, acting as Scribe for MD Patricia Fung MD : The scribe's documentation has been prepared under my direction and personally reviewed by me in its entirety. I confirm that the note above accurately reflects all work, treatment, procedures, and medical decision making performed by me.    :  This note will not be viewable in [address] [place]

## 2018-12-12 NOTE — PROGRESS NOTES
TRANSFER - IN REPORT:    Verbal report received from frank Alegre(name) on Concha Garcia  being received from ER(unit) for routine progression of care      Report consisted of patients Situation, Background, Assessment and   Recommendations(SBAR). Information from the following report(s) SBAR, Procedure Summary, Intake/Output, MAR and Recent Results was reviewed with the receiving nurse. Opportunity for questions and clarification was provided. Assessment completed upon patients arrival to unit and care assumed.

## 2018-12-12 NOTE — ED NOTES
Called oncology consult however Dr. Bakari Buchanan does not handle sickle cell disease, will inform Dr. Pepe Escobar

## 2018-12-12 NOTE — ED NOTES
TRANSFER - OUT REPORT:    Verbal report given to Magaly Angulo RN(name) on Pete Aase  being transferred to oncology(unit) for routine progression of care       Report consisted of patients Situation, Background, Assessment and   Recommendations(SBAR). Information from the following report(s) SBAR, Kardex and MAR was reviewed with the receiving nurse. Lines:   Peripheral IV 12/12/18 Left Antecubital (Active)   Site Assessment Clean, dry, & intact 12/12/2018 10:24 AM   Phlebitis Assessment 0 12/12/2018 10:24 AM   Infiltration Assessment 4 12/12/2018 10:24 AM   Dressing Status Occlusive 12/12/2018 10:24 AM   Dressing Type 4 X 4;Tape;Transparent 12/12/2018 10:24 AM   Hub Color/Line Status Green;Flushed;Patent 12/12/2018 10:24 AM   Action Taken Wrapped 12/12/2018 10:24 AM        Opportunity for questions and clarification was provided.       Patient transported with:   [employer]

## 2018-12-12 NOTE — CONSULTS
Hematology Oncology Consultation    Reason for consult: SS crisis    Admitting Diagnosis: Sickle cell crisis ([facility] [address]    Impression: SS pain crisis - The principles of managing a pain crisis include appropriate IV hydration as well as administration of opioids, antiemetics, and anti-pruritics. Typically, hydration at a rate of 100-150 cc/hr is given. One needs to keep in mind that sickle cell patients have hyposthenuria, such that they can not concentrate their urine and avoid dehydration if unable to maintain oral intake. One needs to be careful to avoid hyper-hydration if there is pulmonary hypertension or renal disease. For most healthy adult patients with sickle cell disease, the normal creatinine is about 0.5, so her creatinine of 1.8 is significant renal insufficiency and will need to be watched closely. No NSAIDs. If she does not improve quickly, would check renal sonogram and ponder renal consult. The current recommendation for oxygen is to supplement only if there is a low O2 saturation which in SS disease is defined as a saturation of less than 93%. There is no benefit to additional oxygen if the patient is not hypoxemic. One does need to watch for nocturnal desaturation so if the patient is not on supplemental oxygen, an O2 saturation should be checked periodically at night. Recommended doses of opioids for management of pain crisis for those not chronically on opioids are morphine 5-10 mg and hydromoprhone 1-2 mg with higher doses if opioid tolerant. The initial doses are repeated q 15 minutes until good pain relief is achieved but the patient is not overly sedated. Subsequent doses are given every 2-4 hours. Delivery via a patient controlled analgesia pump may be more efficacious and efficient.  For an opioid naïve patient, recommended doses for hydromorphone is 0.2-0.5 mf/hr and prn and for morphine is 1-4 mg/hr and prn with 6-10 minute lockouts and with monitoring of VS, O2 saturation, and pain score. Higher doses are used in the opioid tolerant. Meperidine is to be avoided to avoid seizures. She reports responding well to 1 mg IV q 3 hrs of dilaudid and 10 mg po q 6 hrs of hydrocodone which is currently ordered. Nausea is a common side effect of opioids. Phenergan, prochloroperazine, and ondansetron are ordered to assist in relief of nausea. Opioids also cause histamine release from mast cells. Diphenhydramine 25-50 mg q 4-6 hours either PO or IV may be used to relieve itch. Hydroxyzine (vistaril) may also prove useful and for intractable itch. Constipation also needs to be addressed and stool softeners and/or laxatives need to be prescribed. NSAIDs are contraindicated in the setting of pulmonary hypertension or renal insufficiency. Incentive spirometry q 2 hours has been shown to reduce the risk of acute chest syndrome and will be ordered. Ambulation also decreases the risk of acute chest syndrome and the patient will be encouraged to walk when her legs are not quite so painful. She has had more crises this year than she has historically. She is not interested in hydroxyyrea. I have mentioned to her that there is a new agent approved for sickle cell disease, the first new one in decades, Endari or L glutamine which may be of interest. When she recovers, this might be a future conversation. Thanks for the consult! Discussion: Ayo Shankar is a  66y.o. year old seen in consultation at the request of Dr. Randi Rodríguez for SS crisis. She reports experiencing her earliest symptoms at age 3 but did not get diagnosed until she was in grade school and was initially followed at [facility] by 3 SS clinicians (Dr. Shiva Muniz among others). Since age 25, she has been followed by Dr. Stefan Ramachandran and has done remarkably well. This year has been the exception for her with at least 4 crises requiring inpatient hospitalization.  This particular crisis appears to have been triggered by her efforts to get her car dug out of the snow. Her fentanyl patch and hydrocodone were ineffective in relieving her pain. Her pain is located in the ankles, knees and inguinal regions and is quite severe. She reports seeing a retinal specialist at [facility]  She had had a stress test 2 years ago and was told her heart was fine. CXray pending. Labs:    Recent Results (from the past 24 hour(s))   CBC W/O DIFF    Collection Time: 12/12/18 10:13 AM   Result Value Ref Range    WBC 5.1 3.6 - 11.0 K/uL    RBC 2.63 (L) 3.80 - 5.20 M/uL    HGB 8.1 (L) 11.5 - 16.0 g/dL    HCT 24.0 (L) 35.0 - 47.0 %    MCV 91.3 80.0 - 99.0 FL    MCH 30.8 26.0 - 34.0 PG    MCHC 33.8 30.0 - 36.5 g/dL    RDW 15.9 (H) 11.5 - 14.5 %    PLATELET 528 137 - 655 K/uL    MPV 11.2 8.9 - 12.9 FL    NRBC 0.8 (H) 0  WBC    ABSOLUTE NRBC 0.04 (H) 0.00 - 5.47 K/uL   METABOLIC PANEL, COMPREHENSIVE    Collection Time: 12/12/18 10:13 AM   Result Value Ref Range    Sodium 135 (L) 136 - 145 mmol/L    Potassium 2.9 (L) 3.5 - 5.1 mmol/L    Chloride 101 97 - 108 mmol/L    CO2 24 21 - 32 mmol/L    Anion gap 10 5 - 15 mmol/L    Glucose 148 (H) 65 - 100 mg/dL    BUN 18 6 - 20 MG/DL    Creatinine 1.80 (H) 0.55 - 1.02 MG/DL    BUN/Creatinine ratio 10 (L) 12 - 20      GFR est AA 35 (L) >60 ml/min/1.73m2    GFR est non-AA 29 (L) >60 ml/min/1.73m2    Calcium 9.2 8.5 - 10.1 MG/DL    Bilirubin, total 2.3 (H) 0.2 - 1.0 MG/DL    ALT (SGPT) 16 12 - 78 U/L    AST (SGOT) 34 15 - 37 U/L    Alk.  phosphatase 106 45 - 117 U/L    Protein, total 9.5 (H) 6.4 - 8.2 g/dL    Albumin 4.0 3.5 - 5.0 g/dL    Globulin 5.5 (H) 2.0 - 4.0 g/dL    A-G Ratio 0.7 (L) 1.1 - 2.2     MAGNESIUM    Collection Time: 12/12/18 10:13 AM   Result Value Ref Range    Magnesium 2.1 1.6 - 2.4 mg/dL   RETICULOCYTE COUNT    Collection Time: 12/12/18 10:16 AM   Result Value Ref Range    Reticulocyte count 3.8 (H) 0.7 - 2.1 %    Absolute Retic Cnt. 0.0984 (H) 0.0164 - 0.0776 M/ul   URINALYSIS W/ REFLEX CULTURE    Collection Time: 12/12/18  3:27 PM   Result Value Ref Range    Color YELLOW/STRAW      Appearance CLEAR CLEAR      Specific gravity 1.010 1.003 - 1.030      pH (UA) 6.0 5.0 - 8.0      Protein NEGATIVE  NEG mg/dL    Glucose NEGATIVE  NEG mg/dL    Ketone NEGATIVE  NEG mg/dL    Bilirubin NEGATIVE  NEG      Blood NEGATIVE  NEG      Urobilinogen 1.0 0.2 - 1.0 EU/dL    Nitrites NEGATIVE  NEG      Leukocyte Esterase SMALL (A) NEG      WBC 0-4 0 - 4 /hpf    RBC 0-5 0 - 5 /hpf    Epithelial cells FEW FEW /lpf    Bacteria NEGATIVE  NEG /hpf    UA:UC IF INDICATED CULTURE NOT INDICATED BY UA RESULT CNI      Mucus 1+ (A) NEG /lpf   EKG, 12 LEAD, INITIAL    Collection Time: 12/12/18  6:20 PM   Result Value Ref Range    Ventricular Rate 72 BPM    Atrial Rate 72 BPM    P-R Interval 158 ms    QRS Duration 84 ms    Q-T Interval 418 ms    QTC Calculation (Bezet) 457 ms    Calculated P Axis 119 degrees    Calculated R Axis 134 degrees    Calculated T Axis 140 degrees    Diagnosis       ** Suspect arm lead reversal, interpretation assumes no reversal  Normal sinus rhythm  Right axis deviation  Nonspecific ST and T wave abnormality  Abnormal ECG  When compared with ECG of 28-OCT-2018 22:34,  QRS axis shifted right  Non-specific change in ST segment in Lateral leads  QT has shortened         History:  Past Medical History:   Diagnosis Date    Anemia     SC hemoglobinopathy    Chronic pain     Depression     Hypertension     Liver disease     hepatitis C; pt reports \"cured\"    Sickle cell disease ([address]       Past Surgical History:   Procedure Laterality Date    BREAST SURGERY PROCEDURE UNLISTED      2 cysts removed from R breast    CHEST SURGERY PROCEDURE UNLISTED      status post thor for removal of a benign     HX BREAST BIOPSY Right 05/2007    negative    HX CHOLECYSTECTOMY      HX ORTHOPAEDIC      bony curettage of an ostial myelitis focus      Prior to Admission medications    Medication Sig Start Date End Date Taking?  Authorizing Provider HYDROcodone-acetaminophen (NORCO)  mg tablet Take 1 Tab by mouth every six (6) hours as needed. Max Daily Amount: 4 Tabs. 12/5/18   Radha Restrepo MD   fentaNYL (DURAGESIC) 75 mcg/hr 1 Patch by TransDERmal route every seventy-two (72) hours. Max Daily Amount: 1 Patch. 10/23/18   Jackson Collins MD   ondansetron (ZOFRAN ODT) 4 mg disintegrating tablet Take 1 Tab by mouth every eight (8) hours as needed for Nausea. 7/15/18   Olu Adam MD   promethazine (PHENERGAN) 25 mg suppository Insert 1 Suppository into rectum every six (6) hours as needed for Nausea. 7/15/18   Olu Adam MD   folic acid (FOLVITE) 1 mg tablet Take 1 Tab by mouth daily. 6/12/18   Radha Restrepo MD   OMEPRAZOLE PO Take 20 mg by mouth daily. Ean Porter MD   cyclobenzaprine (FLEXERIL) 10 mg tablet Take 1 Tab by mouth three (3) times daily as needed for Muscle Spasm(s). 11/28/17   Radha Restrepo MD   citalopram (CELEXA) 10 mg tablet TAKE 1 TABLET BY MOUTH EVERY NIGHT AT BEDTIME 11/28/17   Radha Restrepo MD   losartan-hydroCHLOROthiazide [facility]) 50-12.5 mg per tablet TAKE 1 TABLET BY MOUTH DAILY 11/16/17   Radha Restrepo MD   fluocinoNIDE (LIDEX) 0.05 % topical cream two (2) times daily as needed.  8/1/14   Provider, Historical     Allergies   Allergen Reactions    Dilaudid [Hydromorphone (Bulk)] Itching     Can tolerate with benadryl and ondansetron    Percocet [Oxycodone-Acetaminophen] Itching      Social History     Tobacco Use    Smoking status: Never Smoker    Smokeless tobacco: Never Used   Substance Use Topics    Alcohol use: No      Family History   Problem Relation Age of Onset    Hypertension Mother     Hypertension Father         Current Medications:  Current Facility-Administered Medications   Medication Dose Route Frequency    HYDROmorphone (PF) (DILAUDID) injection 1 mg  1 mg IntraVENous Q3H PRN    citalopram (CELEXA) tablet 10 mg  10 mg Oral QPM    cyclobenzaprine (FLEXERIL) tablet 10 mg 10 mg Oral TID PRN    fentaNYL (DURAGESIC) 75 mcg/hr patch 1 Patch  1 Patch TransDERmal Q72H    [START ON 37/89/3076] folic acid (FOLVITE) tablet 1 mg  1 mg Oral DAILY    HYDROcodone-acetaminophen (NORCO)  mg tablet 1 Tab  1 Tab Oral Q6H PRN    [START ON 12/13/2018] pantoprazole (PROTONIX) tablet 40 mg  40 mg Oral DAILY    sodium chloride (NS) flush 5-10 mL  5-10 mL IntraVENous Q8H    sodium chloride (NS) flush 5-10 mL  5-10 mL IntraVENous PRN    acetaminophen (TYLENOL) tablet 650 mg  650 mg Oral Q6H PRN    ondansetron (ZOFRAN) injection 4 mg  4 mg IntraVENous Q6H PRN    docusate sodium (COLACE) capsule 100 mg  100 mg Oral DAILY PRN    0.9% sodium chloride infusion  100 mL/hr IntraVENous CONTINUOUS    [START ON 12/13/2018] enoxaparin (LOVENOX) injection 40 mg  40 mg SubCUTAneous Q24H         ROS negative for the 11 organ systems except as noted above. Physical Exam:  Constitutional Alert, cooperative, oriented. Mood and affect appropriate. Appears close to chronological age. Well nourished. Well developed. Head Normocephalic; no scars   Eyes Conjunctivae and sclerae are clear and without icterus. Pupils are round   ENMT Sinuses are nontender. No oral exudates, ulcers, masses, thrush or mucositis. Oropharynx clear. Tongue normal.   Neck Supple without masses or thyromegaly. No jugular venous distension. Hematologic/Lymphatic No petechiae or purpura. No tender or palpable lymph nodes noted. Respiratory Lungs are clear to auscultation without rhonchi or wheezing. Cardiovascular Regular rate and rhythm of heart without murmurs, gallops or rubs. Chest / Line Site Chest is symmetric with no chest wall deformities. Abdomen Non-tender, non-distended, no masses, ascites or hepatosplenomegaly. Good bowel sounds. Musculoskeletal No tenderness or swelling, normal range of motion without obvious weakness. Extremities No visible deformities, no cyanosis, clubbing or edema.     Skin No rashes, scars, or lesions suggestive of malignancy. No petechiae, purpura, or ecchymoses. No excoriations. Neurologic No sensory or motor deficits noted but not specifically tested. Psychiatric Alert and oriented. Coherent speech. Verbalizes understanding of our discussions today.

## 2018-12-13 LAB
ANION GAP SERPL CALC-SCNC: 7 MMOL/L (ref 5–15)
ATRIAL RATE: 72 BPM
BASOPHILS # BLD: 0 K/UL (ref 0–0.1)
BASOPHILS NFR BLD: 0 % (ref 0–1)
BUN SERPL-MCNC: 11 MG/DL (ref 6–20)
BUN/CREAT SERPL: 9 (ref 12–20)
CALCIUM SERPL-MCNC: 8.3 MG/DL (ref 8.5–10.1)
CALCULATED P AXIS, ECG09: 119 DEGREES
CALCULATED R AXIS, ECG10: 134 DEGREES
CALCULATED T AXIS, ECG11: 140 DEGREES
CHLORIDE SERPL-SCNC: 104 MMOL/L (ref 97–108)
CO2 SERPL-SCNC: 24 MMOL/L (ref 21–32)
CREAT SERPL-MCNC: 1.21 MG/DL (ref 0.55–1.02)
CRP SERPL-MCNC: <0.29 MG/DL (ref 0–0.6)
DIAGNOSIS, 93000: NORMAL
DIFFERENTIAL METHOD BLD: ABNORMAL
EOSINOPHIL # BLD: 0.4 K/UL (ref 0–0.4)
EOSINOPHIL NFR BLD: 4 % (ref 0–7)
ERYTHROCYTE [DISTWIDTH] IN BLOOD BY AUTOMATED COUNT: 15.6 % (ref 11.5–14.5)
GLUCOSE SERPL-MCNC: 118 MG/DL (ref 65–100)
HCT VFR BLD AUTO: 19 % (ref 35–47)
HGB BLD-MCNC: 6.5 G/DL (ref 11.5–16)
IMM GRANULOCYTES # BLD: 0 K/UL (ref 0–0.04)
IMM GRANULOCYTES NFR BLD AUTO: 0 % (ref 0–0.5)
LYMPHOCYTES # BLD: 5.5 K/UL (ref 0.8–3.5)
LYMPHOCYTES NFR BLD: 64 % (ref 12–49)
MCH RBC QN AUTO: 31.3 PG (ref 26–34)
MCHC RBC AUTO-ENTMCNC: 34.2 G/DL (ref 30–36.5)
MCV RBC AUTO: 91.3 FL (ref 80–99)
MONOCYTES # BLD: 1 K/UL (ref 0–1)
MONOCYTES NFR BLD: 11 % (ref 5–13)
NEUTS SEG # BLD: 1.8 K/UL (ref 1.8–8)
NEUTS SEG NFR BLD: 21 % (ref 32–75)
NRBC # BLD: 0.03 K/UL (ref 0–0.01)
NRBC BLD-RTO: 0.3 PER 100 WBC
P-R INTERVAL, ECG05: 158 MS
PLATELET # BLD AUTO: 226 K/UL (ref 150–400)
PMV BLD AUTO: 11 FL (ref 8.9–12.9)
POTASSIUM SERPL-SCNC: 2.8 MMOL/L (ref 3.5–5.1)
Q-T INTERVAL, ECG07: 418 MS
QRS DURATION, ECG06: 84 MS
QTC CALCULATION (BEZET), ECG08: 457 MS
RBC # BLD AUTO: 2.08 M/UL (ref 3.8–5.2)
SODIUM SERPL-SCNC: 135 MMOL/L (ref 136–145)
VENTRICULAR RATE, ECG03: 72 BPM
WBC # BLD AUTO: 8.7 K/UL (ref 3.6–11)

## 2018-12-13 PROCEDURE — 65660000000 HC RM CCU STEPDOWN

## 2018-12-13 PROCEDURE — 74011250636 HC RX REV CODE- 250/636: Performed by: EMERGENCY MEDICINE

## 2018-12-13 PROCEDURE — 86140 C-REACTIVE PROTEIN: CPT

## 2018-12-13 PROCEDURE — 36415 COLL VENOUS BLD VENIPUNCTURE: CPT

## 2018-12-13 PROCEDURE — 74011250637 HC RX REV CODE- 250/637: Performed by: INTERNAL MEDICINE

## 2018-12-13 PROCEDURE — 85025 COMPLETE CBC W/AUTO DIFF WBC: CPT

## 2018-12-13 PROCEDURE — 80048 BASIC METABOLIC PNL TOTAL CA: CPT

## 2018-12-13 PROCEDURE — 74011250636 HC RX REV CODE- 250/636: Performed by: INTERNAL MEDICINE

## 2018-12-13 PROCEDURE — 65270000015 HC RM PRIVATE ONCOLOGY

## 2018-12-13 RX ORDER — POTASSIUM CHLORIDE, DEXTROSE MONOHYDRATE AND SODIUM CHLORIDE 300; 5; 900 MG/100ML; G/100ML; MG/100ML
INJECTION, SOLUTION INTRAVENOUS CONTINUOUS
Status: DISCONTINUED | OUTPATIENT
Start: 2018-12-13 | End: 2018-12-14

## 2018-12-13 RX ORDER — DIPHENHYDRAMINE HYDROCHLORIDE 50 MG/ML
25 INJECTION, SOLUTION INTRAMUSCULAR; INTRAVENOUS
Status: DISCONTINUED | OUTPATIENT
Start: 2018-12-13 | End: 2019-01-03 | Stop reason: HOSPADM

## 2018-12-13 RX ADMIN — Medication 10 ML: at 21:26

## 2018-12-13 RX ADMIN — DOCUSATE SODIUM 100 MG: 100 CAPSULE, LIQUID FILLED ORAL at 14:37

## 2018-12-13 RX ADMIN — HYDROMORPHONE HYDROCHLORIDE 1 MG: 1 INJECTION, SOLUTION INTRAMUSCULAR; INTRAVENOUS; SUBCUTANEOUS at 14:37

## 2018-12-13 RX ADMIN — DEXTROSE MONOHYDRATE, SODIUM CHLORIDE, AND POTASSIUM CHLORIDE: 50; 9; 2.98 INJECTION, SOLUTION INTRAVENOUS at 21:25

## 2018-12-13 RX ADMIN — HYDROCODONE BITARTRATE AND ACETAMINOPHEN 1 TABLET: 10; 325 TABLET ORAL at 06:38

## 2018-12-13 RX ADMIN — ENOXAPARIN SODIUM 40 MG: 40 INJECTION SUBCUTANEOUS at 08:34

## 2018-12-13 RX ADMIN — ONDANSETRON 4 MG: 2 INJECTION INTRAMUSCULAR; INTRAVENOUS at 14:35

## 2018-12-13 RX ADMIN — HYDROMORPHONE HYDROCHLORIDE 1 MG: 1 INJECTION, SOLUTION INTRAMUSCULAR; INTRAVENOUS; SUBCUTANEOUS at 01:56

## 2018-12-13 RX ADMIN — HYDROMORPHONE HYDROCHLORIDE 1 MG: 1 INJECTION, SOLUTION INTRAMUSCULAR; INTRAVENOUS; SUBCUTANEOUS at 08:34

## 2018-12-13 RX ADMIN — DEXTROSE MONOHYDRATE, SODIUM CHLORIDE, AND POTASSIUM CHLORIDE: 50; 9; 2.98 INJECTION, SOLUTION INTRAVENOUS at 11:15

## 2018-12-13 RX ADMIN — CITALOPRAM HYDROBROMIDE 10 MG: 20 TABLET ORAL at 17:51

## 2018-12-13 RX ADMIN — DIPHENHYDRAMINE HYDROCHLORIDE 25 MG: 50 INJECTION, SOLUTION INTRAMUSCULAR; INTRAVENOUS at 01:56

## 2018-12-13 RX ADMIN — Medication 10 ML: at 23:50

## 2018-12-13 RX ADMIN — ONDANSETRON 4 MG: 2 INJECTION INTRAMUSCULAR; INTRAVENOUS at 21:49

## 2018-12-13 RX ADMIN — HYDROMORPHONE HYDROCHLORIDE 1 MG: 1 INJECTION, SOLUTION INTRAMUSCULAR; INTRAVENOUS; SUBCUTANEOUS at 11:42

## 2018-12-13 RX ADMIN — SODIUM CHLORIDE 100 ML/HR: 900 INJECTION, SOLUTION INTRAVENOUS at 04:20

## 2018-12-13 RX ADMIN — DIPHENHYDRAMINE HYDROCHLORIDE 25 MG: 50 INJECTION, SOLUTION INTRAMUSCULAR; INTRAVENOUS at 08:31

## 2018-12-13 RX ADMIN — HYDROMORPHONE HYDROCHLORIDE 1 MG: 1 INJECTION, SOLUTION INTRAMUSCULAR; INTRAVENOUS; SUBCUTANEOUS at 23:49

## 2018-12-13 RX ADMIN — FOLIC ACID 1 MG: 1 TABLET ORAL at 08:33

## 2018-12-13 RX ADMIN — Medication 10 ML: at 14:39

## 2018-12-13 RX ADMIN — PANTOPRAZOLE SODIUM 40 MG: 40 TABLET, DELAYED RELEASE ORAL at 08:33

## 2018-12-13 RX ADMIN — HYDROCODONE BITARTRATE AND ACETAMINOPHEN 1 TABLET: 10; 325 TABLET ORAL at 12:41

## 2018-12-13 RX ADMIN — DIPHENHYDRAMINE HYDROCHLORIDE 25 MG: 50 INJECTION, SOLUTION INTRAMUSCULAR; INTRAVENOUS at 21:50

## 2018-12-13 RX ADMIN — Medication 10 ML: at 08:35

## 2018-12-13 RX ADMIN — DIPHENHYDRAMINE HYDROCHLORIDE 25 MG: 50 INJECTION, SOLUTION INTRAMUSCULAR; INTRAVENOUS at 14:35

## 2018-12-13 RX ADMIN — ONDANSETRON 4 MG: 2 INJECTION INTRAMUSCULAR; INTRAVENOUS at 08:31

## 2018-12-13 RX ADMIN — HYDROMORPHONE HYDROCHLORIDE 1 MG: 1 INJECTION, SOLUTION INTRAMUSCULAR; INTRAVENOUS; SUBCUTANEOUS at 17:47

## 2018-12-13 RX ADMIN — HYDROCODONE BITARTRATE AND ACETAMINOPHEN 1 TABLET: 10; 325 TABLET ORAL at 20:36

## 2018-12-13 NOTE — PROGRESS NOTES
Oncology Nursing Communication Tool  2:56 PM  12/13/2018     \"Bedside\" shift change report given to Froy Maher RN (incoming nurse) by Brice Jones (outgoing nurse) on Veda Saini. Report included the following information        Shift Summary: pain management      Issues for physician to address:none        Oncology Shift Note   Admission Date 12/12/2018   Admission Diagnosis Sickle cell crisis ([address]   Code Status Full Code   Consults IP CONSULT TO HOSPITALIST  IP CONSULT TO ONCOLOGY      Cardiac Monitoring [] Yes [] No      Purposeful Hourly Rounding [] Yes    Mukund Score Total Score: 1   Mukund score 3 or > [] Bed Alarm [] Avasys [] 1:1 sitter [] Patient refused (Place signed refusal form in chart)      Pain Managed [] Yes [] No    Key Pain Meds             HYDROcodone-acetaminophen (NORCO)  mg tablet Take 1 Tab by mouth every six (6) hours as needed. Max Daily Amount: 4 Tabs. fentaNYL (DURAGESIC) 75 mcg/hr 1 Patch by TransDERmal route every seventy-two (72) hours. Max Daily Amount: 1 Patch. Influenza Vaccine Received Flu Vaccine for Current Season (usually Sept-March): Yes           Oxygen needs?  [] Room air Oxygen @  []1L    []2L    []3L   []4L    []5L   []6L     Use home O2? [] Yes [] No  Perform O2 challenge test using  smartphrase (.oxygenchallenge)      Last bowel movement Last Bowel Movement Date: 12/11/18  bowel movement      Urinary Catheter             LDAs               Peripheral IV Anterior;Right Wrist (Active)   Site Assessment Clean, dry, & intact 12/13/2018 10:13 AM   Phlebitis Assessment 0 12/13/2018 10:13 AM   Infiltration Assessment 0 12/13/2018 10:13 AM   Dressing Status Clean, dry, & intact 12/13/2018 10:13 AM   Dressing Type Transparent 12/13/2018 10:13 AM   Hub Color/Line Status Infusing;Yellow 12/13/2018 10:13 AM                         Readmission Risk Assessment Tool Score Medium Risk            19       Total Score        3 Has Seen PCP in Last 6 Months (Yes=3, No=0)    9 IP Visits Last 12 Months (1-3=4, 4=9, >4=11)    5 Pt. Coverage (Medicare=5 , Medicaid, or Self-Pay=4)    2 Charlson Comorbidity Score (Age + Comorbid Conditions)        Criteria that do not apply:    . Living with Significant Other. Assisted Living. LTAC. SNF.  or   Rehab    Patient Length of Stay (>5 days = 3)       Expected Length of Stay 2d 16h   Actual Length of Stay [id number]

## 2018-12-13 NOTE — PROGRESS NOTES
Oncology Nursing Communication Tool  8:29 AM  12/13/2018     Bedside shift change report given to Kathrine Krause RN (incoming nurse) by Brady Velez (outgoing nurse) on Fan Smith. Report included the following information SBAR, Kardex, Intake/Output and MAR. Shift Summary: pain management, periods of low bp at night    Issues for physician to address: Oncology Shift Note   Admission Date 12/12/2018   Admission Diagnosis Sickle cell crisis ([facility] [address]   Code Status Full Code   Consults IP CONSULT TO HOSPITALIST  IP CONSULT TO ONCOLOGY      Cardiac Monitoring [] Yes [] No      Purposeful Hourly Rounding [] Yes    Mukund Score Total Score: 1   Mukund score 3 or > [] Bed Alarm [] Avasys [] 1:1 sitter [] Patient refused (Place signed refusal form in chart)      Pain Managed [] Yes [] No    Key Pain Meds             HYDROcodone-acetaminophen (NORCO)  mg tablet Take 1 Tab by mouth every six (6) hours as needed. Max Daily Amount: 4 Tabs. fentaNYL (DURAGESIC) 75 mcg/hr 1 Patch by TransDERmal route every seventy-two (72) hours. Max Daily Amount: 1 Patch. Influenza Vaccine Received Flu Vaccine for Current Season (usually Sept-March): Yes           Oxygen needs? [] Room air Oxygen @  []1L    []2L    []3L   []4L    []5L   []6L     Use home O2? [] Yes [] No  Perform O2 challenge test using  smartphrase (.oxygenchallenge)      Last bowel movement Last Bowel Movement Date: 12/11/18  bowel movement      Urinary Catheter             LDAs                                    Readmission Risk Assessment Tool Score Medium Risk            19       Total Score        3 Has Seen PCP in Last 6 Months (Yes=3, No=0)    9 IP Visits Last 12 Months (1-3=4, 4=9, >4=11)    5 Pt. Coverage (Medicare=5 , Medicaid, or Self-Pay=4)    2 Charlson Comorbidity Score (Age + Comorbid Conditions)        Criteria that do not apply:    . Living with Significant Other. Assisted Living. LTAC. SNF.  or   Rehab    Patient Length of Stay (>5 days = 3)       Expected Length of Stay - - -   Actual Length of Stay [address] [place] [address]

## 2018-12-13 NOTE — PROGRESS NOTES
Hematology Oncology Progress Note         Follow up for: SC crisis    Chart notes reviewed since last visit. Case discussed with following: nursing staff. Patient complains of the following: pain in legs primarily - stable - does NOT want pain med orders changed. Additional concerns noted by the staff:     Patient Vitals for the past 24 hrs:   BP Temp Pulse Resp SpO2   12/13/18 0800 109/56 98.3 °F (36.8 °C) 68 16 100 %   12/13/18 0323 116/62   16 100 %   12/13/18 0254 93/42 98.2 °F (36.8 °C) 72 16 99 %   12/12/18 2340 100/49 98.9 °F (37.2 °C) 73 18 98 %   12/12/18 2003 109/60 98.8 °F (37.1 °C) 80 18 100 %   12/12/18 1648 114/64 99.4 °F (37.4 °C) 72 16 99 %   12/12/18 1400 108/64    100 %   12/12/18 1330 104/61    100 %   12/12/18 1300 106/63    100 %   12/12/18 1230 108/62       12/12/18 1200 107/64    100 %   12/12/18 1130 101/63    100 %   12/12/18 1100 103/65    100 %   12/12/18 1038     99 %     ROS negative at present for 11 organ systems except as noted. Physical Examination:  Constitutional Alert, cooperative, oriented. Mood and affect appropriate. Appears close to chronological age. Well nourished. Well developed. Head Normocephalic; no scars   Eyes Conjunctivae and sclerae are clear and without icterus. Pupils are round   ENMT Sinuses are nontender. No oral exudates, ulcers, masses, thrush or mucositis. Oropharynx clear. Tongue normal.   Neck Supple without masses or thyromegaly. No jugular venous distension. Hematologic/Lymphatic No petechiae or purpura. No tender or palpable lymph nodes noted   Respiratory Lungs are clear to auscultation without rhonchi or wheezing. Cardiovascular Regular rate and rhythm of heart without murmurs, gallops or rubs. Chest / Line Site Chest is symmetric with no chest wall deformities. Abdomen Non-tender, non-distended, no masses, ascites or hepatosplenomegaly. Good bowel sounds.    Musculoskeletal No tenderness or swelling, normal range of motion without obvious weakness. Extremities No visible deformities, no cyanosis, clubbing or edema. Skin No rashes, scars, or lesions suggestive of malignancy. No petechiae, purpura, or ecchymoses. No excoriations. Neurologic No sensory or motor deficits noted but not specifically tested. Psychiatric Alert and oriented. Coherent speech. Verbalizes understanding of our discussions today. Labs:  Recent Results (from the past 24 hour(s))   URINALYSIS W/ REFLEX CULTURE    Collection Time: 12/12/18  3:27 PM   Result Value Ref Range    Color YELLOW/STRAW      Appearance CLEAR CLEAR      Specific gravity 1.010 1.003 - 1.030      pH (UA) 6.0 5.0 - 8.0      Protein NEGATIVE  NEG mg/dL    Glucose NEGATIVE  NEG mg/dL    Ketone NEGATIVE  NEG mg/dL    Bilirubin NEGATIVE  NEG      Blood NEGATIVE  NEG      Urobilinogen 1.0 0.2 - 1.0 EU/dL    Nitrites NEGATIVE  NEG      Leukocyte Esterase SMALL (A) NEG      WBC 0-4 0 - 4 /hpf    RBC 0-5 0 - 5 /hpf    Epithelial cells FEW FEW /lpf    Bacteria NEGATIVE  NEG /hpf    UA:UC IF INDICATED CULTURE NOT INDICATED BY UA RESULT CNI      Mucus 1+ (A) NEG /lpf   EKG, 12 LEAD, INITIAL    Collection Time: 12/12/18  6:20 PM   Result Value Ref Range    Ventricular Rate 72 BPM    Atrial Rate 72 BPM    P-R Interval 158 ms    QRS Duration 84 ms    Q-T Interval 418 ms    QTC Calculation (Bezet) 457 ms    Calculated P Axis 119 degrees    Calculated R Axis 134 degrees    Calculated T Axis 140 degrees    Diagnosis       ** Suspect arm lead reversal, interpretation assumes no reversal  Normal sinus rhythm  Right axis deviation  Nonspecific ST and T wave abnormality  When compared with ECG of 28-OCT-2018 22:34,  Non-specific change in ST segment in Lateral leads  Confirmed by Kamilla Gonsalez P.JORGE ([id number]) on 12/13/2018 10:09:45 AM         Assessment and Plan:   SC crisis - she appears to be doing fairly well.  The pain appears to be fairly well controlled on her current doses of dilaudid and hydrocodone The laboratories yesterday revealed a hemoglobin acceptable for the current situation albeit low. The electrolytes yesterday revealed hypokalemia - she is a difficult stick and today's labs are pending. I will check a cbc and bmp tomorrow. The folic acid is to continue.

## 2018-12-13 NOTE — PROGRESS NOTES
Problem: Falls - Risk of  Goal: *Absence of Falls  Document Mukund Fall Risk and appropriate interventions in the flowsheet.   Outcome: Progressing Towards Goal  Fall Risk Interventions:            Medication Interventions: Assess postural VS orthostatic hypotension

## 2018-12-13 NOTE — PROGRESS NOTES
Reason for Admission:   Sickle cell crisis                  RRAT Score:     19             Do you (patient/family) have any concerns for transition/discharge? No concerns at this time               Plan for utilizing home health:   Pt is independent with ADL's and IADL's, active and driving prior to admission. Pt has not used HH,DME,SNF/Rehab in past. CM will follow for discharge recommendations for pt does not feel services will be warranted. Likelihood of readmission?   moderate            Transition of Care Plan:      Pt is a 66year old female. CM met with pt, introduced self and role. Pt alert/oriented and in no acute distress. Demographics verified, preferred pharmacy is [facility]. Pt is current with PCP Dr. Comfort Jiménez, last saw in November. Pt will discharge to home, brother will transport her there and pt will transport self to follow up appt. Pt lives in a 1 story home with her daughter with 3 steps into entrance. Pt does not have an Advanced Directive and is not interested in information or talking to someone at this time.      Onofre Carmona, KISHAN, RN  Care Manager [id number]  [phone]

## 2018-12-14 LAB
ANION GAP SERPL CALC-SCNC: 7 MMOL/L (ref 5–15)
BACTERIA SPEC CULT: NORMAL
BACTERIA SPEC CULT: NORMAL
BASOPHILS # BLD: 0 K/UL (ref 0–0.1)
BASOPHILS NFR BLD: 0 % (ref 0–1)
BUN SERPL-MCNC: 9 MG/DL (ref 6–20)
BUN/CREAT SERPL: 10 (ref 12–20)
CALCIUM SERPL-MCNC: 8.5 MG/DL (ref 8.5–10.1)
CHLORIDE SERPL-SCNC: 109 MMOL/L (ref 97–108)
CO2 SERPL-SCNC: 23 MMOL/L (ref 21–32)
CREAT SERPL-MCNC: 0.91 MG/DL (ref 0.55–1.02)
CRP SERPL-MCNC: <0.29 MG/DL (ref 0–0.6)
DIFFERENTIAL METHOD BLD: ABNORMAL
EOSINOPHIL # BLD: 0.5 K/UL (ref 0–0.4)
EOSINOPHIL NFR BLD: 5 % (ref 0–7)
ERYTHROCYTE [DISTWIDTH] IN BLOOD BY AUTOMATED COUNT: 16.1 % (ref 11.5–14.5)
GLUCOSE SERPL-MCNC: 93 MG/DL (ref 65–100)
HCT VFR BLD AUTO: 20.5 % (ref 35–47)
HGB BLD-MCNC: 7 G/DL (ref 11.5–16)
IMM GRANULOCYTES # BLD: 0 K/UL (ref 0–0.04)
IMM GRANULOCYTES NFR BLD AUTO: 0 % (ref 0–0.5)
LYMPHOCYTES # BLD: 6.3 K/UL (ref 0.8–3.5)
LYMPHOCYTES NFR BLD: 62 % (ref 12–49)
MCH RBC QN AUTO: 31.4 PG (ref 26–34)
MCHC RBC AUTO-ENTMCNC: 34.1 G/DL (ref 30–36.5)
MCV RBC AUTO: 91.9 FL (ref 80–99)
MONOCYTES # BLD: 0.8 K/UL (ref 0–1)
MONOCYTES NFR BLD: 8 % (ref 5–13)
NEUTS SEG # BLD: 2.6 K/UL (ref 1.8–8)
NEUTS SEG NFR BLD: 25 % (ref 32–75)
NRBC # BLD: 0.04 K/UL (ref 0–0.01)
NRBC BLD-RTO: 0.4 PER 100 WBC
PLATELET # BLD AUTO: 207 K/UL (ref 150–400)
PMV BLD AUTO: 11.2 FL (ref 8.9–12.9)
POTASSIUM SERPL-SCNC: 3.7 MMOL/L (ref 3.5–5.1)
RBC # BLD AUTO: 2.23 M/UL (ref 3.8–5.2)
RBC MORPH BLD: ABNORMAL
SERVICE CMNT-IMP: NORMAL
SODIUM SERPL-SCNC: 139 MMOL/L (ref 136–145)
WBC # BLD AUTO: 10.2 K/UL (ref 3.6–11)

## 2018-12-14 PROCEDURE — 74011250636 HC RX REV CODE- 250/636: Performed by: INTERNAL MEDICINE

## 2018-12-14 PROCEDURE — 36415 COLL VENOUS BLD VENIPUNCTURE: CPT

## 2018-12-14 PROCEDURE — 80048 BASIC METABOLIC PNL TOTAL CA: CPT

## 2018-12-14 PROCEDURE — 65660000000 HC RM CCU STEPDOWN

## 2018-12-14 PROCEDURE — 94760 N-INVAS EAR/PLS OXIMETRY 1: CPT

## 2018-12-14 PROCEDURE — 85025 COMPLETE CBC W/AUTO DIFF WBC: CPT

## 2018-12-14 PROCEDURE — 74011250636 HC RX REV CODE- 250/636: Performed by: EMERGENCY MEDICINE

## 2018-12-14 PROCEDURE — 65270000015 HC RM PRIVATE ONCOLOGY

## 2018-12-14 PROCEDURE — 86140 C-REACTIVE PROTEIN: CPT

## 2018-12-14 PROCEDURE — 74011250637 HC RX REV CODE- 250/637: Performed by: INTERNAL MEDICINE

## 2018-12-14 RX ADMIN — HYDROCODONE BITARTRATE AND ACETAMINOPHEN 1 TABLET: 10; 325 TABLET ORAL at 12:02

## 2018-12-14 RX ADMIN — DEXTROSE MONOHYDRATE, SODIUM CHLORIDE, AND POTASSIUM CHLORIDE: 50; 9; 2.98 INJECTION, SOLUTION INTRAVENOUS at 18:30

## 2018-12-14 RX ADMIN — Medication 10 ML: at 13:58

## 2018-12-14 RX ADMIN — HYDROMORPHONE HYDROCHLORIDE 1 MG: 1 INJECTION, SOLUTION INTRAMUSCULAR; INTRAVENOUS; SUBCUTANEOUS at 16:56

## 2018-12-14 RX ADMIN — CITALOPRAM HYDROBROMIDE 10 MG: 20 TABLET ORAL at 17:00

## 2018-12-14 RX ADMIN — DIPHENHYDRAMINE HYDROCHLORIDE 25 MG: 50 INJECTION, SOLUTION INTRAMUSCULAR; INTRAVENOUS at 13:57

## 2018-12-14 RX ADMIN — Medication 10 ML: at 20:40

## 2018-12-14 RX ADMIN — DIPHENHYDRAMINE HYDROCHLORIDE 25 MG: 50 INJECTION, SOLUTION INTRAMUSCULAR; INTRAVENOUS at 20:40

## 2018-12-14 RX ADMIN — DIPHENHYDRAMINE HYDROCHLORIDE 25 MG: 50 INJECTION, SOLUTION INTRAMUSCULAR; INTRAVENOUS at 08:06

## 2018-12-14 RX ADMIN — HYDROMORPHONE HYDROCHLORIDE 1 MG: 1 INJECTION, SOLUTION INTRAMUSCULAR; INTRAVENOUS; SUBCUTANEOUS at 20:39

## 2018-12-14 RX ADMIN — HYDROMORPHONE HYDROCHLORIDE 1 MG: 1 INJECTION, SOLUTION INTRAMUSCULAR; INTRAVENOUS; SUBCUTANEOUS at 11:00

## 2018-12-14 RX ADMIN — Medication 10 ML: at 22:00

## 2018-12-14 RX ADMIN — ONDANSETRON 4 MG: 2 INJECTION INTRAMUSCULAR; INTRAVENOUS at 08:06

## 2018-12-14 RX ADMIN — HYDROCODONE BITARTRATE AND ACETAMINOPHEN 1 TABLET: 10; 325 TABLET ORAL at 18:29

## 2018-12-14 RX ADMIN — PANTOPRAZOLE SODIUM 40 MG: 40 TABLET, DELAYED RELEASE ORAL at 08:09

## 2018-12-14 RX ADMIN — Medication 10 ML: at 06:20

## 2018-12-14 RX ADMIN — HYDROMORPHONE HYDROCHLORIDE 1 MG: 1 INJECTION, SOLUTION INTRAMUSCULAR; INTRAVENOUS; SUBCUTANEOUS at 08:08

## 2018-12-14 RX ADMIN — ONDANSETRON 4 MG: 2 INJECTION INTRAMUSCULAR; INTRAVENOUS at 13:57

## 2018-12-14 RX ADMIN — FOLIC ACID 1 MG: 1 TABLET ORAL at 08:08

## 2018-12-14 RX ADMIN — HYDROMORPHONE HYDROCHLORIDE 1 MG: 1 INJECTION, SOLUTION INTRAMUSCULAR; INTRAVENOUS; SUBCUTANEOUS at 13:58

## 2018-12-14 RX ADMIN — ENOXAPARIN SODIUM 40 MG: 40 INJECTION SUBCUTANEOUS at 08:08

## 2018-12-14 RX ADMIN — ONDANSETRON 4 MG: 2 INJECTION INTRAMUSCULAR; INTRAVENOUS at 20:40

## 2018-12-14 RX ADMIN — HYDROCODONE BITARTRATE AND ACETAMINOPHEN 1 TABLET: 10; 325 TABLET ORAL at 06:29

## 2018-12-14 NOTE — PROGRESS NOTES
Hematology Oncology Progress Note         Follow up for: SC crisis    Chart notes reviewed since last visit. Case discussed with following: nursing staff. Patient complains of the following: pain in legs primarily - stable - worried that at times her BP was low, causing nursing staff to not want to give her pain medication. Wonders what can be done to get BP up higher. Additional concerns noted by the staff:     Patient Vitals for the past 24 hrs:   BP Temp Pulse Resp SpO2   12/14/18 1057 107/68 98.8 °F (37.1 °C) 84 16 97 %   12/14/18 0700 112/56 98.9 °F (37.2 °C) 81 20 98 %   12/14/18 0305 91/53 98.6 °F (37 °C) 76 16 96 %   12/13/18 2335 108/62       12/13/18 2238 90/42 98.2 °F (36.8 °C) 81 16 100 %   12/13/18 2001 98/54       12/13/18 1950 (!) 89/43 98.7 °F (37.1 °C) 80 17 99 %   12/13/18 1619 103/56 98.8 °F (37.1 °C) 80 16 100 %     ROS negative at present for 11 organ systems except as noted. Physical Examination:  Constitutional Alert, cooperative, oriented. Mood and affect appropriate. Appears close to chronological age. Well nourished. Well developed. Head Normocephalic; no scars   Eyes Conjunctivae and sclerae are clear and without icterus. Pupils are round   ENMT Sinuses are nontender. No oral exudates, ulcers, masses, thrush or mucositis. Oropharynx clear. Tongue normal.   Neck Supple without masses or thyromegaly. No jugular venous distension. Hematologic/Lymphatic No petechiae or purpura. No tender or palpable lymph nodes noted   Respiratory Lungs are clear to auscultation without rhonchi or wheezing. Cardiovascular Regular rate and rhythm of heart without murmurs, gallops or rubs. Chest / Line Site Chest is symmetric with no chest wall deformities. Abdomen Non-tender, non-distended, no masses, ascites or hepatosplenomegaly. Good bowel sounds. Musculoskeletal No tenderness or swelling, normal range of motion without obvious weakness.    Extremities No visible deformities, no cyanosis, clubbing or edema. Skin No rashes, scars, or lesions suggestive of malignancy. No petechiae, purpura, or ecchymoses. No excoriations. Neurologic No sensory or motor deficits noted but not specifically tested. Psychiatric Alert and oriented. Coherent speech. Verbalizes understanding of our discussions today. Labs:  Recent Results (from the past 24 hour(s))   CBC WITH AUTOMATED DIFF    Collection Time: 12/14/18  3:24 AM   Result Value Ref Range    WBC 10.2 3.6 - 11.0 K/uL    RBC 2.23 (L) 3.80 - 5.20 M/uL    HGB 7.0 (L) 11.5 - 16.0 g/dL    HCT 20.5 (L) 35.0 - 47.0 %    MCV 91.9 80.0 - 99.0 FL    MCH 31.4 26.0 - 34.0 PG    MCHC 34.1 30.0 - 36.5 g/dL    RDW 16.1 (H) 11.5 - 14.5 %    PLATELET 509 156 - 778 K/uL    MPV 11.2 8.9 - 12.9 FL    NRBC 0.4 (H) 0  WBC    ABSOLUTE NRBC 0.04 (H) 0.00 - 0.01 K/uL    NEUTROPHILS 25 (L) 32 - 75 %    LYMPHOCYTES 62 (H) 12 - 49 %    MONOCYTES 8 5 - 13 %    EOSINOPHILS 5 0 - 7 %    BASOPHILS 0 0 - 1 %    IMMATURE GRANULOCYTES 0 0.0 - 0.5 %    ABS. NEUTROPHILS 2.6 1.8 - 8.0 K/UL    ABS. LYMPHOCYTES 6.3 (H) 0.8 - 3.5 K/UL    ABS. MONOCYTES 0.8 0.0 - 1.0 K/UL    ABS. EOSINOPHILS 0.5 (H) 0.0 - 0.4 K/UL    ABS. BASOPHILS 0.0 0.0 - 0.1 K/UL    ABS. IMM.  GRANS. 0.0 0.00 - 0.04 K/UL    DF AUTOMATED      RBC COMMENTS ANISOCYTOSIS  1+        RBC COMMENTS HYPOCHROMIA  1+        RBC COMMENTS TARGET CELLS  PRESENT        RBC COMMENTS SICKLE CELLS  PRESENT        RBC COMMENTS MACROCYTOSIS  1+       METABOLIC PANEL, BASIC    Collection Time: 12/14/18  3:24 AM   Result Value Ref Range    Sodium 139 136 - 145 mmol/L    Potassium 3.7 3.5 - 5.1 mmol/L    Chloride 109 (H) 97 - 108 mmol/L    CO2 23 21 - 32 mmol/L    Anion gap 7 5 - 15 mmol/L    Glucose 93 65 - 100 mg/dL    BUN 9 6 - 20 MG/DL    Creatinine 0.91 0.55 - 1.02 MG/DL    BUN/Creatinine ratio 10 (L) 12 - 20      GFR est AA >60 >60 ml/min/1.73m2    GFR est non-AA >60 >60 ml/min/1.73m2    Calcium 8.5 8.5 - 10.1 MG/DL   C REACTIVE PROTEIN, QT    Collection Time: 12/14/18  3:24 AM   Result Value Ref Range    C-Reactive protein <0.29 0.00 - 0.60 mg/dL       Assessment and Plan:   SC crisis - she appears to be doing fairly well. The pain appears to be fairly well controlled on her current doses of dilaudid and hydrocodone although the low BP has caused angst in the nursing staff who have wished to hold narcotics due to concern of BP dropping lower. I have indicated to her that so long as she is not light headed or dizzy and is in bed and requests help getting up to bathroom, I am okay with her getting narcotics. She is not on any BP meds to discontinue and there is no obvious \"fix\" for her current BP. The laboratories today revealed a hemoglobin acceptable for the current situation albeit low. Her CRPs remain low - crisis certainly not appearing worse. Much of her current aches and pains may be from the unaccustomed physical activity in the snow. Continue folate. Please contact service if any questions over the weekend.

## 2018-12-14 NOTE — PROGRESS NOTES
Problem: Falls - Risk of  Goal: *Absence of Falls  Document Mukund Fall Risk and appropriate interventions in the flowsheet.   Outcome: Progressing Towards Goal  Fall Risk Interventions:            Medication Interventions: Patient to call before getting OOB, Teach patient to arise slowly

## 2018-12-14 NOTE — PROGRESS NOTES
Oncology Nursing Communication Tool  12:23 PM  12/14/2018     \"Bedside\" shift change report given to Judge Marcial RN (incoming nurse) by Luiza Yun (outgoing nurse) on [facility]. Report included the following information . Shift Summary: pain management, ) BM for 2 days    Issues for physician to address: pain management       Oncology Shift Note   Admission Date 12/12/2018   Admission Diagnosis Sickle cell crisis ([facility] [address]   Code Status Full Code   Consults IP CONSULT TO HOSPITALIST  IP CONSULT TO ONCOLOGY      Cardiac Monitoring [x] Yes [] No      Purposeful Hourly Rounding [x] Yes    Mukund Score Total Score: 1   Mukund score 3 or > [] Bed Alarm [] Avasys [] 1:1 sitter [] Patient refused (Place signed refusal form in chart)      Pain Managed [] Yes [x] No    Key Pain Meds             HYDROcodone-acetaminophen (NORCO)  mg tablet Take 1 Tab by mouth every six (6) hours as needed. Max Daily Amount: 4 Tabs. fentaNYL (DURAGESIC) 75 mcg/hr 1 Patch by TransDERmal route every seventy-two (72) hours. Max Daily Amount: 1 Patch. Influenza Vaccine Received Flu Vaccine for Current Season (usually Sept-March): Yes           Oxygen needs? [x] Room air Oxygen @  []1L    []2L    []3L   []4L    []5L   []6L     Use home O2? [] Yes [] No  Perform O2 challenge test using  smartphrase (.oxygenchallenge)      Last bowel movement Last Bowel Movement Date: 12/11/18  bowel movement      Urinary Catheter             LDAs               Peripheral IV Anterior;Right Wrist (Active)   Site Assessment Clean, dry, & intact 12/14/2018  9:03 AM   Phlebitis Assessment 0 12/14/2018  9:03 AM   Infiltration Assessment 0 12/14/2018  9:03 AM   Dressing Status Clean, dry, & intact 12/14/2018  9:03 AM   Dressing Type Tape;Transparent 12/14/2018  9:03 AM   Hub Color/Line Status Yellow; Infusing 12/14/2018  9:03 AM   Action Taken Open ports on tubing capped 12/13/2018  8:00 PM   Alcohol Cap Used Yes 12/13/2018  3:13 PM Readmission Risk Assessment Tool Score Medium Risk            19       Total Score        3 Has Seen PCP in Last 6 Months (Yes=3, No=0)    9 IP Visits Last 12 Months (1-3=4, 4=9, >4=11)    5 Pt. Coverage (Medicare=5 , Medicaid, or Self-Pay=4)    2 Charlson Comorbidity Score (Age + Comorbid Conditions)        Criteria that do not apply:    . Living with Significant Other. Assisted Living. LTAC. SNF.  or   Rehab    Patient Length of Stay (>5 days = 3)       Expected Length of Stay 2d 16h   Actual Length of Stay [address] [facility]

## 2018-12-14 NOTE — PROGRESS NOTES
Oncology Nursing Communication Tool  7:06 AM  12/14/2018     Bedside shift change report given to Saundra Lopez RN (incoming nurse) by Kavin Garcia (outgoing nurse) on [facility]. Report included the following information SBAR, Kardex, Intake/Output and MAR. Shift Summary: Pt remained stable during shift. At beginning of shift Pt BP was 98/54 manually. BP monitored as pain medications were given. Norco x2 and Dilaudid x2. Zofran and Benadryl x1. No BM through out the night. Issues for physician to address: Oncology Shift Note   Admission Date 12/12/2018   Admission Diagnosis Sickle cell crisis ([facility] [address]   Code Status Full Code   Consults IP CONSULT TO HOSPITALIST  IP CONSULT TO ONCOLOGY      Cardiac Monitoring [x] Yes [] No      Purposeful Hourly Rounding [x] Yes    Mukund Score Total Score: 1   Mukund score 3 or > [] Bed Alarm [] Avasys [] 1:1 sitter [] Patient refused (Place signed refusal form in chart)      Pain Managed [x] Yes [] No    Key Pain Meds             HYDROcodone-acetaminophen (NORCO)  mg tablet Take 1 Tab by mouth every six (6) hours as needed. Max Daily Amount: 4 Tabs. fentaNYL (DURAGESIC) 75 mcg/hr 1 Patch by TransDERmal route every seventy-two (72) hours. Max Daily Amount: 1 Patch. Influenza Vaccine Received Flu Vaccine for Current Season (usually Sept-March): Yes           Oxygen needs?  [x] Room air Oxygen @  []1L    []2L    []3L   []4L    []5L   []6L     Use home O2? [] Yes [x] No  Perform O2 challenge test using  smartphrase (.oxygenchallenge)      Last bowel movement Last Bowel Movement Date: 12/11/18  bowel movement      Urinary Catheter             LDAs               Peripheral IV Anterior;Right Wrist (Active)   Site Assessment Clean, dry, & intact 12/14/2018  3:05 AM   Phlebitis Assessment 0 12/14/2018  3:05 AM   Infiltration Assessment 0 12/14/2018  3:05 AM   Dressing Status Clean, dry, & intact 12/14/2018  3:05 AM   Dressing Type Transparent;Tape 12/14/2018  3:05 AM   Hub Color/Line Status Yellow; Infusing 12/14/2018  3:05 AM   Action Taken Open ports on tubing capped 12/13/2018  8:00 PM   Alcohol Cap Used Yes 12/13/2018  3:13 PM                         Readmission Risk Assessment Tool Score Medium Risk            19       Total Score        3 Has Seen PCP in Last 6 Months (Yes=3, No=0)    9 IP Visits Last 12 Months (1-3=4, 4=9, >4=11)    5 Pt. Coverage (Medicare=5 , Medicaid, or Self-Pay=4)    2 Charlson Comorbidity Score (Age + Comorbid Conditions)        Criteria that do not apply:    . Living with Significant Other. Assisted Living. LTAC. SNF.  or   Rehab    Patient Length of Stay (>5 days = 3)       Expected Length of Stay 2d 16h   Actual Length of Stay 2          Charissa Vernon

## 2018-12-14 NOTE — PROGRESS NOTES
Bedside and Verbal shift change report given to Bairon Douglas RN (oncoming nurse) by Melanie Merida RN (offgoing nurse).  Report included the following information SBAR, Kardex, Intake/Output, MAR and Recent Results.

## 2018-12-14 NOTE — PROGRESS NOTES
General Daily Progress Note    Admit Date: 12/12/2018    Subjective:     Patient continues to complain of pain. Current Facility-Administered Medications   Medication Dose Route Frequency    diphenhydrAMINE (BENADRYL) injection 25 mg  25 mg IntraVENous Q4H PRN    dextrose 5% - 0.9% NaCl with KCl 40 mEq/L infusion   IntraVENous CONTINUOUS    HYDROmorphone (PF) (DILAUDID) injection 1 mg  1 mg IntraVENous Q3H PRN    citalopram (CELEXA) tablet 10 mg  10 mg Oral QPM    cyclobenzaprine (FLEXERIL) tablet 10 mg  10 mg Oral TID PRN    fentaNYL (DURAGESIC) 75 mcg/hr patch 1 Patch  1 Patch TransDERmal V64C    folic acid (FOLVITE) tablet 1 mg  1 mg Oral DAILY    HYDROcodone-acetaminophen (NORCO)  mg tablet 1 Tab  1 Tab Oral Q6H PRN    pantoprazole (PROTONIX) tablet 40 mg  40 mg Oral DAILY    sodium chloride (NS) flush 5-10 mL  5-10 mL IntraVENous Q8H    sodium chloride (NS) flush 5-10 mL  5-10 mL IntraVENous PRN    acetaminophen (TYLENOL) tablet 650 mg  650 mg Oral Q6H PRN    ondansetron (ZOFRAN) injection 4 mg  4 mg IntraVENous Q6H PRN    docusate sodium (COLACE) capsule 100 mg  100 mg Oral DAILY PRN    enoxaparin (LOVENOX) injection 40 mg  40 mg SubCUTAneous Q24H    polyethylene glycol (MIRALAX) packet 17 g  17 g Oral BID PRN        Review of Systems  A comprehensive review of systems was negative. Objective:     Patient Vitals for the past 24 hrs:   BP Temp Pulse Resp SpO2   12/13/18 2001 98/54       12/13/18 1950 (!) 89/43 98.7 °F (37.1 °C) 80 17 99 %   12/13/18 1619 103/56 98.8 °F (37.1 °C) 80 16 100 %   12/13/18 1100 109/61 98.2 °F (36.8 °C) 75 16 100 %   12/13/18 0800 109/56 98.3 °F (36.8 °C) 68 16 100 %   12/13/18 0323 116/62   16 100 %   12/13/18 0254 93/42 98.2 °F (36.8 °C) 72 16 99 %   12/12/18 2340 100/49 98.9 °F (37.2 °C) 73 18 98 %     No intake/output data recorded. 12/12 0701 - 12/13 1900  In: 0899 [P.O.:720;  I.V.:675]  Out: -     Physical Exam:   Visit Vitals  BP 98/54 (BP 1 Location: Left arm, BP Patient Position: At rest)   Pulse 80   Temp 98.7 °F (37.1 °C)   Resp 17   Ht 5' 11\" (1.803 m)   Wt 169 lb 12.1 oz (77 kg)   SpO2 99%   Breastfeeding? No   BMI 23.68 kg/m²     General appearance: alert, cooperative, no distress, appears stated age  Neck: supple, symmetrical, trachea midline, no adenopathy, thyroid: not enlarged, symmetric, no tenderness/mass/nodules, no carotid bruit and no JVD  Lungs: clear to auscultation bilaterally  Heart: regular rate and rhythm, S1, S2 normal, no murmur, click, rub or gallop  Abdomen: soft, non-tender. Bowel sounds normal. No masses,  no organomegaly  Extremities: extremities normal, atraumatic, no cyanosis or edema        Data Review   Recent Results (from the past 24 hour(s))   CBC WITH AUTOMATED DIFF    Collection Time: 12/13/18 10:27 AM   Result Value Ref Range    WBC 8.7 3.6 - 11.0 K/uL    RBC 2.08 (L) 3.80 - 5.20 M/uL    HGB 6.5 (L) 11.5 - 16.0 g/dL    HCT 19.0 (L) 35.0 - 47.0 %    MCV 91.3 80.0 - 99.0 FL    MCH 31.3 26.0 - 34.0 PG    MCHC 34.2 30.0 - 36.5 g/dL    RDW 15.6 (H) 11.5 - 14.5 %    PLATELET 929 807 - 411 K/uL    MPV 11.0 8.9 - 12.9 FL    NRBC 0.3 (H) 0  WBC    ABSOLUTE NRBC 0.03 (H) 0.00 - 0.01 K/uL    NEUTROPHILS 21 (L) 32 - 75 %    LYMPHOCYTES 64 (H) 12 - 49 %    MONOCYTES 11 5 - 13 %    EOSINOPHILS 4 0 - 7 %    BASOPHILS 0 0 - 1 %    IMMATURE GRANULOCYTES 0 0.0 - 0.5 %    ABS. NEUTROPHILS 1.8 1.8 - 8.0 K/UL    ABS. LYMPHOCYTES 5.5 (H) 0.8 - 3.5 K/UL    ABS. MONOCYTES 1.0 0.0 - 1.0 K/UL    ABS. EOSINOPHILS 0.4 0.0 - 0.4 K/UL    ABS. BASOPHILS 0.0 0.0 - 0.1 K/UL    ABS. IMM.  GRANS. 0.0 0.00 - 0.04 K/UL    DF AUTOMATED     METABOLIC PANEL, BASIC    Collection Time: 12/13/18 10:27 AM   Result Value Ref Range    Sodium 135 (L) 136 - 145 mmol/L    Potassium 2.8 (L) 3.5 - 5.1 mmol/L    Chloride 104 97 - 108 mmol/L    CO2 24 21 - 32 mmol/L    Anion gap 7 5 - 15 mmol/L    Glucose 118 (H) 65 - 100 mg/dL    BUN 11 6 - 20 MG/DL Creatinine 1.21 (H) 0.55 - 1.02 MG/DL    BUN/Creatinine ratio 9 (L) 12 - 20      GFR est AA 56 (L) >60 ml/min/1.73m2    GFR est non-AA 46 (L) >60 ml/min/1.73m2    Calcium 8.3 (L) 8.5 - 10.1 MG/DL   C REACTIVE PROTEIN, QT    Collection Time: 12/13/18 10:27 AM   Result Value Ref Range    C-Reactive protein <0.29 0.00 - 0.60 mg/dL           Assessment:     Active Problems:    [facility] (5/22/2017)        Plan:     1. Continue treatment of painful crises thus far without complications. 2.  Continue hydration correction of hypokalemia.

## 2018-12-15 LAB
ANION GAP SERPL CALC-SCNC: 5 MMOL/L (ref 5–15)
BASOPHILS # BLD: 0 K/UL (ref 0–0.1)
BASOPHILS NFR BLD: 0 % (ref 0–1)
BUN SERPL-MCNC: 4 MG/DL (ref 6–20)
BUN/CREAT SERPL: 5 (ref 12–20)
CALCIUM SERPL-MCNC: 7.8 MG/DL (ref 8.5–10.1)
CHLORIDE SERPL-SCNC: 111 MMOL/L (ref 97–108)
CO2 SERPL-SCNC: 23 MMOL/L (ref 21–32)
CREAT SERPL-MCNC: 0.81 MG/DL (ref 0.55–1.02)
CRP SERPL-MCNC: <0.29 MG/DL (ref 0–0.6)
DIFFERENTIAL METHOD BLD: ABNORMAL
EOSINOPHIL # BLD: 0.5 K/UL (ref 0–0.4)
EOSINOPHIL NFR BLD: 5 % (ref 0–7)
ERYTHROCYTE [DISTWIDTH] IN BLOOD BY AUTOMATED COUNT: 16.5 % (ref 11.5–14.5)
GLUCOSE SERPL-MCNC: 108 MG/DL (ref 65–100)
HCT VFR BLD AUTO: 18.2 % (ref 35–47)
HGB BLD-MCNC: 6.1 G/DL (ref 11.5–16)
IMM GRANULOCYTES # BLD: 0 K/UL (ref 0–0.04)
IMM GRANULOCYTES NFR BLD AUTO: 0 % (ref 0–0.5)
LYMPHOCYTES # BLD: 5.5 K/UL (ref 0.8–3.5)
LYMPHOCYTES NFR BLD: 57 % (ref 12–49)
MCH RBC QN AUTO: 31 PG (ref 26–34)
MCHC RBC AUTO-ENTMCNC: 33.5 G/DL (ref 30–36.5)
MCV RBC AUTO: 92.4 FL (ref 80–99)
MONOCYTES # BLD: 1.4 K/UL (ref 0–1)
MONOCYTES NFR BLD: 14 % (ref 5–13)
NEUTS SEG # BLD: 2.4 K/UL (ref 1.8–8)
NEUTS SEG NFR BLD: 24 % (ref 32–75)
NRBC # BLD: 0.06 K/UL (ref 0–0.01)
NRBC BLD-RTO: 0.6 PER 100 WBC
PLATELET # BLD AUTO: 207 K/UL (ref 150–400)
PMV BLD AUTO: 10.8 FL (ref 8.9–12.9)
POTASSIUM SERPL-SCNC: 3.8 MMOL/L (ref 3.5–5.1)
RBC # BLD AUTO: 1.97 M/UL (ref 3.8–5.2)
RBC MORPH BLD: ABNORMAL
SODIUM SERPL-SCNC: 139 MMOL/L (ref 136–145)
WBC # BLD AUTO: 9.8 K/UL (ref 3.6–11)

## 2018-12-15 PROCEDURE — 85025 COMPLETE CBC W/AUTO DIFF WBC: CPT

## 2018-12-15 PROCEDURE — 36415 COLL VENOUS BLD VENIPUNCTURE: CPT

## 2018-12-15 PROCEDURE — 74011250637 HC RX REV CODE- 250/637: Performed by: INTERNAL MEDICINE

## 2018-12-15 PROCEDURE — 65660000000 HC RM CCU STEPDOWN

## 2018-12-15 PROCEDURE — 74011250636 HC RX REV CODE- 250/636: Performed by: EMERGENCY MEDICINE

## 2018-12-15 PROCEDURE — 86140 C-REACTIVE PROTEIN: CPT

## 2018-12-15 PROCEDURE — 74011250636 HC RX REV CODE- 250/636: Performed by: INTERNAL MEDICINE

## 2018-12-15 PROCEDURE — 80048 BASIC METABOLIC PNL TOTAL CA: CPT

## 2018-12-15 PROCEDURE — 65270000015 HC RM PRIVATE ONCOLOGY

## 2018-12-15 RX ORDER — DEXTROSE, SODIUM CHLORIDE, AND POTASSIUM CHLORIDE 5; .45; .15 G/100ML; G/100ML; G/100ML
100 INJECTION INTRAVENOUS CONTINUOUS
Status: DISCONTINUED | OUTPATIENT
Start: 2018-12-15 | End: 2018-12-17

## 2018-12-15 RX ADMIN — HYDROMORPHONE HYDROCHLORIDE 1 MG: 1 INJECTION, SOLUTION INTRAMUSCULAR; INTRAVENOUS; SUBCUTANEOUS at 23:29

## 2018-12-15 RX ADMIN — ONDANSETRON 4 MG: 2 INJECTION INTRAMUSCULAR; INTRAVENOUS at 13:57

## 2018-12-15 RX ADMIN — HYDROMORPHONE HYDROCHLORIDE 1 MG: 1 INJECTION, SOLUTION INTRAMUSCULAR; INTRAVENOUS; SUBCUTANEOUS at 05:06

## 2018-12-15 RX ADMIN — ENOXAPARIN SODIUM 40 MG: 40 INJECTION SUBCUTANEOUS at 08:00

## 2018-12-15 RX ADMIN — DEXTROSE MONOHYDRATE, SODIUM CHLORIDE, AND POTASSIUM CHLORIDE 100 ML/HR: 50; 4.5; 1.49 INJECTION, SOLUTION INTRAVENOUS at 14:08

## 2018-12-15 RX ADMIN — HYDROMORPHONE HYDROCHLORIDE 1 MG: 1 INJECTION, SOLUTION INTRAMUSCULAR; INTRAVENOUS; SUBCUTANEOUS at 20:20

## 2018-12-15 RX ADMIN — ONDANSETRON 4 MG: 2 INJECTION INTRAMUSCULAR; INTRAVENOUS at 03:16

## 2018-12-15 RX ADMIN — FOLIC ACID 1 MG: 1 TABLET ORAL at 08:00

## 2018-12-15 RX ADMIN — DIPHENHYDRAMINE HYDROCHLORIDE 25 MG: 50 INJECTION, SOLUTION INTRAMUSCULAR; INTRAVENOUS at 07:57

## 2018-12-15 RX ADMIN — DIPHENHYDRAMINE HYDROCHLORIDE 25 MG: 50 INJECTION, SOLUTION INTRAMUSCULAR; INTRAVENOUS at 13:57

## 2018-12-15 RX ADMIN — Medication 10 ML: at 00:15

## 2018-12-15 RX ADMIN — DEXTROSE MONOHYDRATE, SODIUM CHLORIDE, AND POTASSIUM CHLORIDE 100 ML/HR: 50; 4.5; 1.49 INJECTION, SOLUTION INTRAVENOUS at 01:19

## 2018-12-15 RX ADMIN — HYDROMORPHONE HYDROCHLORIDE 1 MG: 1 INJECTION, SOLUTION INTRAMUSCULAR; INTRAVENOUS; SUBCUTANEOUS at 13:57

## 2018-12-15 RX ADMIN — HYDROCODONE BITARTRATE AND ACETAMINOPHEN 1 TABLET: 10; 325 TABLET ORAL at 03:16

## 2018-12-15 RX ADMIN — ONDANSETRON 4 MG: 2 INJECTION INTRAMUSCULAR; INTRAVENOUS at 20:20

## 2018-12-15 RX ADMIN — HYDROMORPHONE HYDROCHLORIDE 1 MG: 1 INJECTION, SOLUTION INTRAMUSCULAR; INTRAVENOUS; SUBCUTANEOUS at 17:47

## 2018-12-15 RX ADMIN — Medication 10 ML: at 22:00

## 2018-12-15 RX ADMIN — HYDROMORPHONE HYDROCHLORIDE 1 MG: 1 INJECTION, SOLUTION INTRAMUSCULAR; INTRAVENOUS; SUBCUTANEOUS at 08:00

## 2018-12-15 RX ADMIN — PANTOPRAZOLE SODIUM 40 MG: 40 TABLET, DELAYED RELEASE ORAL at 08:00

## 2018-12-15 RX ADMIN — ONDANSETRON 4 MG: 2 INJECTION INTRAMUSCULAR; INTRAVENOUS at 07:57

## 2018-12-15 RX ADMIN — Medication 10 ML: at 05:07

## 2018-12-15 RX ADMIN — CITALOPRAM HYDROBROMIDE 10 MG: 20 TABLET ORAL at 17:47

## 2018-12-15 RX ADMIN — Medication 10 ML: at 20:22

## 2018-12-15 RX ADMIN — Medication 10 ML: at 23:30

## 2018-12-15 RX ADMIN — HYDROMORPHONE HYDROCHLORIDE 1 MG: 1 INJECTION, SOLUTION INTRAMUSCULAR; INTRAVENOUS; SUBCUTANEOUS at 10:54

## 2018-12-15 RX ADMIN — HYDROCODONE BITARTRATE AND ACETAMINOPHEN 1 TABLET: 10; 325 TABLET ORAL at 15:23

## 2018-12-15 RX ADMIN — DIPHENHYDRAMINE HYDROCHLORIDE 25 MG: 50 INJECTION, SOLUTION INTRAMUSCULAR; INTRAVENOUS at 03:16

## 2018-12-15 RX ADMIN — Medication 10 ML: at 14:04

## 2018-12-15 RX ADMIN — DIPHENHYDRAMINE HYDROCHLORIDE 25 MG: 50 INJECTION, SOLUTION INTRAMUSCULAR; INTRAVENOUS at 20:20

## 2018-12-15 RX ADMIN — HYDROMORPHONE HYDROCHLORIDE 1 MG: 1 INJECTION, SOLUTION INTRAMUSCULAR; INTRAVENOUS; SUBCUTANEOUS at 00:15

## 2018-12-15 NOTE — PROGRESS NOTES
Oncology Nursing Communication Tool  7:36 AM  12/15/2018     Bedside shift change report given to Marleny Cazares RN (incoming nurse) by Katty Munguia (outgoing nurse) on [facility]. Report included the following information SBAR, Kardex, Intake/Output and MAR. Shift Summary: Pt remained stable during shift. Pt had a 10 beat run of V-Tach at 0630. On-call physician for Dr. Debbie Robb notified. No new orders received. PRN dilaudid x3, Benadryl and Zofran x2, Norco x1. Continue to monitor BP with pain medications as it can get low. Issues for physician to address: Oncology Shift Note   Admission Date 12/12/2018   Admission Diagnosis Sickle cell crisis ([facility] [address]   Code Status Full Code   Consults IP CONSULT TO HOSPITALIST  IP CONSULT TO ONCOLOGY      Cardiac Monitoring [x] Yes [] No      Purposeful Hourly Rounding [x] Yes    Mukund Score Total Score: 1   Mukund score 3 or > [] Bed Alarm [] Avasys [] 1:1 sitter [] Patient refused (Place signed refusal form in chart)      Pain Managed [x] Yes [] No    Key Pain Meds             HYDROcodone-acetaminophen (NORCO)  mg tablet Take 1 Tab by mouth every six (6) hours as needed. Max Daily Amount: 4 Tabs. fentaNYL (DURAGESIC) 75 mcg/hr 1 Patch by TransDERmal route every seventy-two (72) hours. Max Daily Amount: 1 Patch. Influenza Vaccine Received Flu Vaccine for Current Season (usually Sept-March): Yes           Oxygen needs?  [x] Room air Oxygen @  []1L    []2L    []3L   []4L    []5L   []6L     Use home O2? [] Yes [x] No  Perform O2 challenge test using  smartphrase (.oxygenchallenge)      Last bowel movement Last Bowel Movement Date: 12/11/18  bowel movement      Urinary Catheter             LDAs               Peripheral IV Anterior;Right Wrist (Active)   Site Assessment Clean, dry, & intact 12/15/2018  3:16 AM   Phlebitis Assessment 0 12/15/2018  3:16 AM   Infiltration Assessment 0 12/15/2018  3:16 AM   Dressing Status Clean, dry, & intact 12/15/2018  3:16 AM   Dressing Type Transparent;Tape 12/15/2018  3:16 AM   Hub Color/Line Status Yellow 12/15/2018  3:16 AM   Action Taken Open ports on tubing capped 12/13/2018  8:00 PM   Alcohol Cap Used Yes 12/13/2018  3:13 PM                         Readmission Risk Assessment Tool Score Medium Risk            19       Total Score        3 Has Seen PCP in Last 6 Months (Yes=3, No=0)    9 IP Visits Last 12 Months (1-3=4, 4=9, >4=11)    5 Pt. Coverage (Medicare=5 , Medicaid, or Self-Pay=4)    2 Charlson Comorbidity Score (Age + Comorbid Conditions)        Criteria that do not apply:    . Living with Significant Other. Assisted Living. LTAC. SNF.  or   Rehab    Patient Length of Stay (>5 days = 3)       Expected Length of Stay 2d 16h   Actual Length of Stay Yesy [facility]y

## 2018-12-15 NOTE — PROGRESS NOTES
Oncology Nursing Communication Tool  6:30 PM  12/15/2018     \"Bedside\" shift change report given to Monster Osuna RN (incoming nurse) by Kenny Mccullough (outgoing nurse) on [facility]. Report included the following information        Shift Summary: pain management      Issues for physician to address: pain regimen seems to not be working so well       Oncology Shift Note   Admission Date 12/12/2018   Admission Diagnosis Sickle cell crisis ([facility] [address]   Code Status Full Code   Consults IP CONSULT TO HOSPITALIST  IP CONSULT TO ONCOLOGY      Cardiac Monitoring [] Yes [] No      Purposeful Hourly Rounding [] Yes    Mukund Score Total Score: 1   Mukund score 3 or > [] Bed Alarm [] Avasys [] 1:1 sitter [] Patient refused (Place signed refusal form in chart)      Pain Managed [] Yes [] No    Key Pain Meds             HYDROcodone-acetaminophen (NORCO)  mg tablet Take 1 Tab by mouth every six (6) hours as needed. Max Daily Amount: 4 Tabs. fentaNYL (DURAGESIC) 75 mcg/hr 1 Patch by TransDERmal route every seventy-two (72) hours. Max Daily Amount: 1 Patch. Influenza Vaccine Received Flu Vaccine for Current Season (usually Sept-March): Yes           Oxygen needs? [] Room air Oxygen @  []1L    []2L    []3L   []4L    []5L   []6L     Use home O2? [] Yes [] No  Perform O2 challenge test using  smartphrase (.oxygenchallenge)      Last bowel movement Last Bowel Movement Date: 12/11/18  bowel movement      Urinary Catheter             LDAs               Peripheral IV Anterior;Right Wrist (Active)   Site Assessment Clean, dry, & intact 12/15/2018  3:21 PM   Phlebitis Assessment 0 12/15/2018  3:21 PM   Infiltration Assessment 0 12/15/2018  3:21 PM   Dressing Status Clean, dry, & intact 12/15/2018  3:21 PM   Dressing Type Transparent;Tape 12/15/2018  3:21 PM   Hub Color/Line Status Yellow; Infusing 12/15/2018  3:21 PM   Action Taken Open ports on tubing capped 12/13/2018  8:00 PM   Alcohol Cap Used Yes 12/13/2018  3:13 PM Readmission Risk Assessment Tool Score Medium Risk            19       Total Score        3 Has Seen PCP in Last 6 Months (Yes=3, No=0)    9 IP Visits Last 12 Months (1-3=4, 4=9, >4=11)    5 Pt. Coverage (Medicare=5 , Medicaid, or Self-Pay=4)    2 Charlson Comorbidity Score (Age + Comorbid Conditions)        Criteria that do not apply:    . Living with Significant Other. Assisted Living. LTAC. SNF.  or   Rehab    Patient Length of Stay (>5 days = 3)       Expected Length of Stay 2d 16h   Actual Length of Stay [address] [facility]

## 2018-12-15 NOTE — PROGRESS NOTES
Hospital Progress Note    NAME:  Michelle Jiang   :   1960   MRN:  [id number]     Date/Time:  12/15/2018 1:03 PM    Plan:   1. Pain control  2. Iv fluids  Risk of Deterioration: Low  []           Moderate  [x]           High  []                 Assessment:   Principal Problem:    Sickle cell crisis ([address] (2017)    Active Problems:    Hypertension (10/3/2014)      Depression (10/5/2014)         Admitting note:66year-old female with past medical history of sickle cell anemia, hypertension, gastroesophageal reflux disease is coming to the hospital with chief complaints of pain in both ankles, knees and also lower abdominal pain.  Patient reports being in her usual state of health until about 2 days ago. Nicole Jiménez reports she was spending a lot of time in the cold and since then started having pain which is mostly in the joints and also in the lower abdominal area.  Does not report any nausea, vomiting or diarrhea.  Denies chest pain, shortness of breath or palpitations.  Denies headache, weakness, tingling or numbness.     On arrival to the hospital her vital signs were within normal limits.  On lab work she was noted to have a hemoglobin of 8.1, reticulocyte count of 3.8.  BMP showed a potassium of 2.9 and creatinine of 1.80.  Chest x-ray is pending at this time. Nicole Jiménez was given IV fluids in the emergency department and also Dilaudid.     Hospitalist admitted for work up and evaluation of the above problems.          Subjective/interium history     Continues with ankle,knee,groin pain  11 Point Review of Systems:   Negative except no cp/sob    []            Unable to obtain ROS due to:       []            mental status change []            sedated []            intubated     Social History     Tobacco Use    Smoking status: Never Smoker    Smokeless tobacco: Never Used   Substance Use Topics    Alcohol use: No     Medications reviewed:  Current Facility-Administered Medications   Medication Dose Route Frequency    dextrose 5% - 0.45% NaCl with KCl 20 mEq/L infusion  100 mL/hr IntraVENous CONTINUOUS    diphenhydrAMINE (BENADRYL) injection 25 mg  25 mg IntraVENous Q4H PRN    HYDROmorphone (PF) (DILAUDID) injection 1 mg  1 mg IntraVENous Q3H PRN    citalopram (CELEXA) tablet 10 mg  10 mg Oral QPM    cyclobenzaprine (FLEXERIL) tablet 10 mg  10 mg Oral TID PRN    fentaNYL (DURAGESIC) 75 mcg/hr patch 1 Patch  1 Patch TransDERmal P70Y    folic acid (FOLVITE) tablet 1 mg  1 mg Oral DAILY    HYDROcodone-acetaminophen (NORCO)  mg tablet 1 Tab  1 Tab Oral Q6H PRN    pantoprazole (PROTONIX) tablet 40 mg  40 mg Oral DAILY    sodium chloride (NS) flush 5-10 mL  5-10 mL IntraVENous Q8H    sodium chloride (NS) flush 5-10 mL  5-10 mL IntraVENous PRN    acetaminophen (TYLENOL) tablet 650 mg  650 mg Oral Q6H PRN    ondansetron (ZOFRAN) injection 4 mg  4 mg IntraVENous Q6H PRN    docusate sodium (COLACE) capsule 100 mg  100 mg Oral DAILY PRN    enoxaparin (LOVENOX) injection 40 mg  40 mg SubCUTAneous Q24H    polyethylene glycol (MIRALAX) packet 17 g  17 g Oral BID PRN        Objective:   Vitals:  Visit Vitals  BP 97/54 (BP 1 Location: Left arm, BP Patient Position: At rest)   Pulse 84   Temp 99.1 °F (37.3 °C)   Resp 16   Ht 5' 11\" (1.803 m)   Wt 169 lb 12.1 oz (77 kg)   SpO2 97%   Breastfeeding? No   BMI 23.68 kg/m²     Temp (24hrs), Av.8 °F (37.1 °C), Min:98.1 °F (36.7 °C), Max:99.2 °F (37.3 °C)      O2 Device: Room air    Last 24hr Input/Output:    Intake/Output Summary (Last 24 hours) at 12/15/2018 1303  Last data filed at 12/15/2018 0316  Gross per 24 hour   Intake 1953.33 ml   Output    Net 1953.33 ml        PHYSICAL EXAM:  General:    Alert, cooperative, no distress, appears stated age. Head:   Normocephalic, without obvious abnormality, atraumatic. Eyes:   Conjunctivae/corneas clear. PERRLA  Nose:  Nares normal. No drainage or sinus tenderness.   Throat:    Lips, mucosa, and tongue normal.  No Thrush  Neck:  Supple, symmetrical,  no adenopathy, thyroid: non tender    no carotid bruit and no JVD. Back:    Symmetric,  No CVA tenderness. Lungs:   Clear to auscultation bilaterally. No Wheezing or Rhonchi. No rales. Chest wall:  No tenderness or deformity. No Accessory muscle use. Heart:   Regular rate and rhythm,  no murmur, rub or gallop. Abdomen:   Soft, non-tender. Not distended.   Bowel sounds normal. No masses         Lab Data Reviewed:    Recent Labs     12/15/18  0346 12/14/18  0324 12/13/18  1027   WBC 9.8 10.2 8.7   HGB 6.1* 7.0* 6.5*   HCT 18.2* 20.5* 19.0*    207 226     Recent Labs     12/15/18  0346 12/14/18  0324 12/13/18  1027    139 135*   K 3.8 3.7 2.8*   * 109* 104   CO2 23 23 24   * 93 118*   BUN 4* 9 11   CREA 0.81 0.91 1.21*   CA 7.8* 8.5 8.3*     Lab Results   Component Value Date/Time    Glucose (POC) 98 07/24/2018 07:39 AM    Glucose (POC) 112 (H) 07/23/2018 09:03 PM    Glucose (POC) 136 (H) 07/23/2018 04:31 PM    Glucose (POC) 95 07/23/2018 12:07 PM    Glucose (POC) 103 (H) 07/23/2018 08:27 AM       ___________________________________________________  ___________________________________________________    Attending Physician: Nica Ceja MD

## 2018-12-15 NOTE — PROGRESS NOTES
General Daily Progress Note    Admit Date: 12/12/2018    Subjective:     Patient conts to c/o pain. Current Facility-Administered Medications   Medication Dose Route Frequency    diphenhydrAMINE (BENADRYL) injection 25 mg  25 mg IntraVENous Q4H PRN    dextrose 5% - 0.9% NaCl with KCl 40 mEq/L infusion   IntraVENous CONTINUOUS    HYDROmorphone (PF) (DILAUDID) injection 1 mg  1 mg IntraVENous Q3H PRN    citalopram (CELEXA) tablet 10 mg  10 mg Oral QPM    cyclobenzaprine (FLEXERIL) tablet 10 mg  10 mg Oral TID PRN    fentaNYL (DURAGESIC) 75 mcg/hr patch 1 Patch  1 Patch TransDERmal [id number]    folic acid (FOLVITE) tablet 1 mg  1 mg Oral DAILY    HYDROcodone-acetaminophen (NORCO)  mg tablet 1 Tab  1 Tab Oral Q6H PRN    pantoprazole (PROTONIX) tablet 40 mg  40 mg Oral DAILY    sodium chloride (NS) flush 5-10 mL  5-10 mL IntraVENous Q8H    sodium chloride (NS) flush 5-10 mL  5-10 mL IntraVENous PRN    acetaminophen (TYLENOL) tablet 650 mg  650 mg Oral Q6H PRN    ondansetron (ZOFRAN) injection 4 mg  4 mg IntraVENous Q6H PRN    docusate sodium (COLACE) capsule 100 mg  100 mg Oral DAILY PRN    enoxaparin (LOVENOX) injection 40 mg  40 mg SubCUTAneous Q24H    polyethylene glycol (MIRALAX) packet 17 g  17 g Oral BID PRN        Review of Systems  A comprehensive review of systems was negative. Objective:     Patient Vitals for the past 24 hrs:   BP Temp Pulse Resp SpO2   12/14/18 2246 98/55 98.1 °F (36.7 °C) 89 20 97 %   12/14/18 2004 116/61 99.2 °F (37.3 °C) 92 20 99 %   12/14/18 1701 116/70  86     12/14/18 1543 (!) 85/43  84  95 %   12/14/18 1500 107/59 98.9 °F (37.2 °C) 84 16 100 %   12/14/18 1057 107/68 98.8 °F (37.1 °C) 84 16 97 %   12/14/18 0700 112/56 98.9 °F (37.2 °C) 81 20 98 %   12/14/18 0305 91/53 98.6 °F (37 °C) 76 16 96 %     12/14 1901 - 12/15 0700  In: 1758.3 [I.V.:1758.3]  Out: -   12/13 0701 - 12/14 1900  In: 1823.3 [P.O.:240;  I.V.:1583.3]  Out: -     Physical Exam:   Visit Vitals  BP 98/55 (BP 1 Location: Right arm, BP Patient Position: At rest) Comment: RN notified   Pulse 89   Temp 98.1 °F (36.7 °C)   Resp 20   Ht 5' 11\" (1.803 m)   Wt 169 lb 12.1 oz (77 kg)   SpO2 97%   Breastfeeding? No   BMI 23.68 kg/m²     General appearance: alert, cooperative, no distress, appears stated age  Neck: supple, symmetrical, trachea midline, no adenopathy, thyroid: not enlarged, symmetric, no tenderness/mass/nodules, no carotid bruit and no JVD  Lungs: clear to auscultation bilaterally  Heart: regular rate and rhythm, S1, S2 normal, no murmur, click, rub or gallop  Abdomen: soft, non-tender. Bowel sounds normal. No masses,  no organomegaly  Extremities: extremities normal, atraumatic, no cyanosis or edema        Data Review   Recent Results (from the past 24 hour(s))   CBC WITH AUTOMATED DIFF    Collection Time: 12/14/18  3:24 AM   Result Value Ref Range    WBC 10.2 3.6 - 11.0 K/uL    RBC 2.23 (L) 3.80 - 5.20 M/uL    HGB 7.0 (L) 11.5 - 16.0 g/dL    HCT 20.5 (L) 35.0 - 47.0 %    MCV 91.9 80.0 - 99.0 FL    MCH 31.4 26.0 - 34.0 PG    MCHC 34.1 30.0 - 36.5 g/dL    RDW 16.1 (H) 11.5 - 14.5 %    PLATELET 238 205 - 493 K/uL    MPV 11.2 8.9 - 12.9 FL    NRBC 0.4 (H) 0  WBC    ABSOLUTE NRBC 0.04 (H) 0.00 - 0.01 K/uL    NEUTROPHILS 25 (L) 32 - 75 %    LYMPHOCYTES 62 (H) 12 - 49 %    MONOCYTES 8 5 - 13 %    EOSINOPHILS 5 0 - 7 %    BASOPHILS 0 0 - 1 %    IMMATURE GRANULOCYTES 0 0.0 - 0.5 %    ABS. NEUTROPHILS 2.6 1.8 - 8.0 K/UL    ABS. LYMPHOCYTES 6.3 (H) 0.8 - 3.5 K/UL    ABS. MONOCYTES 0.8 0.0 - 1.0 K/UL    ABS. EOSINOPHILS 0.5 (H) 0.0 - 0.4 K/UL    ABS. BASOPHILS 0.0 0.0 - 0.1 K/UL    ABS. IMM.  GRANS. 0.0 0.00 - 0.04 K/UL    DF AUTOMATED      RBC COMMENTS ANISOCYTOSIS  1+        RBC COMMENTS HYPOCHROMIA  1+        RBC COMMENTS TARGET CELLS  PRESENT        RBC COMMENTS SICKLE CELLS  PRESENT        RBC COMMENTS MACROCYTOSIS  1+       METABOLIC PANEL, BASIC    Collection Time: 12/14/18  3:24 AM   Result Value Ref Range    Sodium 139 136 - 145 mmol/L    Potassium 3.7 3.5 - 5.1 mmol/L    Chloride 109 (H) 97 - 108 mmol/L    CO2 23 21 - 32 mmol/L    Anion gap 7 5 - 15 mmol/L    Glucose 93 65 - 100 mg/dL    BUN 9 6 - 20 MG/DL    Creatinine 0.91 0.55 - 1.02 MG/DL    BUN/Creatinine ratio 10 (L) 12 - 20      GFR est AA >60 >60 ml/min/1.73m2    GFR est non-AA >60 >60 ml/min/1.73m2    Calcium 8.5 8.5 - 10.1 MG/DL   C REACTIVE PROTEIN, QT    Collection Time: 12/14/18  3:24 AM   Result Value Ref Range    C-Reactive protein <0.29 0.00 - 0.60 mg/dL           Assessment:     Active Problems:    Sickle cell crisis ([facility] [address] (5/22/2017)        Plan: 1. Cont treatment for painful crisis. No complications. 2.Hydration conts. 3.Will mobilize.

## 2018-12-15 NOTE — PROGRESS NOTES
0630: Notified by tele that pt had a 10 beat run of V-Tach. Checked on Pt, pt had just been moving around and an electrode fell off. Electrodes replaced, HR in the 80s. Notified On call physician for Dr. Raydell Duane. Will have strip placed on chart for physician. No new orders received.

## 2018-12-16 LAB
ANION GAP SERPL CALC-SCNC: 5 MMOL/L (ref 5–15)
BASOPHILS # BLD: 0 K/UL (ref 0–0.1)
BASOPHILS NFR BLD: 0 % (ref 0–1)
BUN SERPL-MCNC: 4 MG/DL (ref 6–20)
BUN/CREAT SERPL: 5 (ref 12–20)
CALCIUM SERPL-MCNC: 8.3 MG/DL (ref 8.5–10.1)
CHLORIDE SERPL-SCNC: 110 MMOL/L (ref 97–108)
CO2 SERPL-SCNC: 25 MMOL/L (ref 21–32)
CREAT SERPL-MCNC: 0.76 MG/DL (ref 0.55–1.02)
DIFFERENTIAL METHOD BLD: ABNORMAL
EOSINOPHIL # BLD: 0.4 K/UL (ref 0–0.4)
EOSINOPHIL NFR BLD: 5 % (ref 0–7)
ERYTHROCYTE [DISTWIDTH] IN BLOOD BY AUTOMATED COUNT: 17.4 % (ref 11.5–14.5)
GLUCOSE SERPL-MCNC: 91 MG/DL (ref 65–100)
HCT VFR BLD AUTO: 18.7 % (ref 35–47)
HGB BLD-MCNC: 6.3 G/DL (ref 11.5–16)
IMM GRANULOCYTES # BLD: 0 K/UL (ref 0–0.04)
IMM GRANULOCYTES NFR BLD AUTO: 0 % (ref 0–0.5)
LYMPHOCYTES # BLD: 4.6 K/UL (ref 0.8–3.5)
LYMPHOCYTES NFR BLD: 51 % (ref 12–49)
MCH RBC QN AUTO: 31.7 PG (ref 26–34)
MCHC RBC AUTO-ENTMCNC: 33.7 G/DL (ref 30–36.5)
MCV RBC AUTO: 94 FL (ref 80–99)
MONOCYTES # BLD: 1.2 K/UL (ref 0–1)
MONOCYTES NFR BLD: 14 % (ref 5–13)
NEUTS SEG # BLD: 2.7 K/UL (ref 1.8–8)
NEUTS SEG NFR BLD: 30 % (ref 32–75)
NRBC # BLD: 0.08 K/UL (ref 0–0.01)
NRBC BLD-RTO: 0.9 PER 100 WBC
PLATELET # BLD AUTO: 204 K/UL (ref 150–400)
PMV BLD AUTO: 10.9 FL (ref 8.9–12.9)
POTASSIUM SERPL-SCNC: 4 MMOL/L (ref 3.5–5.1)
RBC # BLD AUTO: 1.99 M/UL (ref 3.8–5.2)
RBC MORPH BLD: ABNORMAL
SODIUM SERPL-SCNC: 140 MMOL/L (ref 136–145)
WBC # BLD AUTO: 8.9 K/UL (ref 3.6–11)

## 2018-12-16 PROCEDURE — 80048 BASIC METABOLIC PNL TOTAL CA: CPT

## 2018-12-16 PROCEDURE — 74011250636 HC RX REV CODE- 250/636: Performed by: INTERNAL MEDICINE

## 2018-12-16 PROCEDURE — 74011250637 HC RX REV CODE- 250/637: Performed by: INTERNAL MEDICINE

## 2018-12-16 PROCEDURE — 74011250636 HC RX REV CODE- 250/636: Performed by: EMERGENCY MEDICINE

## 2018-12-16 PROCEDURE — 85025 COMPLETE CBC W/AUTO DIFF WBC: CPT

## 2018-12-16 PROCEDURE — 36415 COLL VENOUS BLD VENIPUNCTURE: CPT

## 2018-12-16 PROCEDURE — 65270000015 HC RM PRIVATE ONCOLOGY

## 2018-12-16 PROCEDURE — 65660000000 HC RM CCU STEPDOWN

## 2018-12-16 RX ADMIN — Medication 10 ML: at 17:06

## 2018-12-16 RX ADMIN — HYDROMORPHONE HYDROCHLORIDE 1 MG: 1 INJECTION, SOLUTION INTRAMUSCULAR; INTRAVENOUS; SUBCUTANEOUS at 02:32

## 2018-12-16 RX ADMIN — HYDROMORPHONE HYDROCHLORIDE 1 MG: 1 INJECTION, SOLUTION INTRAMUSCULAR; INTRAVENOUS; SUBCUTANEOUS at 16:08

## 2018-12-16 RX ADMIN — DEXTROSE MONOHYDRATE, SODIUM CHLORIDE, AND POTASSIUM CHLORIDE 100 ML/HR: 50; 4.5; 1.49 INJECTION, SOLUTION INTRAVENOUS at 00:19

## 2018-12-16 RX ADMIN — HYDROMORPHONE HYDROCHLORIDE 1 MG: 1 INJECTION, SOLUTION INTRAMUSCULAR; INTRAVENOUS; SUBCUTANEOUS at 05:44

## 2018-12-16 RX ADMIN — DIPHENHYDRAMINE HYDROCHLORIDE 25 MG: 50 INJECTION, SOLUTION INTRAMUSCULAR; INTRAVENOUS at 21:59

## 2018-12-16 RX ADMIN — HYDROCODONE BITARTRATE AND ACETAMINOPHEN 1 TABLET: 10; 325 TABLET ORAL at 21:05

## 2018-12-16 RX ADMIN — DEXTROSE MONOHYDRATE, SODIUM CHLORIDE, AND POTASSIUM CHLORIDE 100 ML/HR: 50; 4.5; 1.49 INJECTION, SOLUTION INTRAVENOUS at 12:58

## 2018-12-16 RX ADMIN — ONDANSETRON 4 MG: 2 INJECTION INTRAMUSCULAR; INTRAVENOUS at 16:08

## 2018-12-16 RX ADMIN — Medication 10 ML: at 05:45

## 2018-12-16 RX ADMIN — ONDANSETRON 4 MG: 2 INJECTION INTRAMUSCULAR; INTRAVENOUS at 21:58

## 2018-12-16 RX ADMIN — DEXTROSE MONOHYDRATE, SODIUM CHLORIDE, AND POTASSIUM CHLORIDE 100 ML/HR: 50; 4.5; 1.49 INJECTION, SOLUTION INTRAVENOUS at 22:59

## 2018-12-16 RX ADMIN — CITALOPRAM HYDROBROMIDE 10 MG: 20 TABLET ORAL at 17:05

## 2018-12-16 RX ADMIN — HYDROMORPHONE HYDROCHLORIDE 1 MG: 1 INJECTION, SOLUTION INTRAMUSCULAR; INTRAVENOUS; SUBCUTANEOUS at 12:56

## 2018-12-16 RX ADMIN — DIPHENHYDRAMINE HYDROCHLORIDE 25 MG: 50 INJECTION, SOLUTION INTRAMUSCULAR; INTRAVENOUS at 02:32

## 2018-12-16 RX ADMIN — Medication 10 ML: at 02:35

## 2018-12-16 RX ADMIN — ONDANSETRON 4 MG: 2 INJECTION INTRAMUSCULAR; INTRAVENOUS at 02:32

## 2018-12-16 RX ADMIN — HYDROMORPHONE HYDROCHLORIDE 1 MG: 1 INJECTION, SOLUTION INTRAMUSCULAR; INTRAVENOUS; SUBCUTANEOUS at 09:31

## 2018-12-16 RX ADMIN — DIPHENHYDRAMINE HYDROCHLORIDE 25 MG: 50 INJECTION, SOLUTION INTRAMUSCULAR; INTRAVENOUS at 09:31

## 2018-12-16 RX ADMIN — ONDANSETRON 4 MG: 2 INJECTION INTRAMUSCULAR; INTRAVENOUS at 09:31

## 2018-12-16 RX ADMIN — FOLIC ACID 1 MG: 1 TABLET ORAL at 09:26

## 2018-12-16 RX ADMIN — HYDROMORPHONE HYDROCHLORIDE 1 MG: 1 INJECTION, SOLUTION INTRAMUSCULAR; INTRAVENOUS; SUBCUTANEOUS at 21:59

## 2018-12-16 RX ADMIN — DIPHENHYDRAMINE HYDROCHLORIDE 25 MG: 50 INJECTION, SOLUTION INTRAMUSCULAR; INTRAVENOUS at 16:08

## 2018-12-16 RX ADMIN — HYDROMORPHONE HYDROCHLORIDE 1 MG: 1 INJECTION, SOLUTION INTRAMUSCULAR; INTRAVENOUS; SUBCUTANEOUS at 19:43

## 2018-12-16 RX ADMIN — ENOXAPARIN SODIUM 40 MG: 40 INJECTION SUBCUTANEOUS at 09:26

## 2018-12-16 RX ADMIN — PANTOPRAZOLE SODIUM 40 MG: 40 TABLET, DELAYED RELEASE ORAL at 09:26

## 2018-12-16 NOTE — PROGRESS NOTES
Oncology Nursing Communication Tool  7:42 AM  12/16/2018     Bedside shift change report given to Gena Molina RN (incoming nurse) by Torri Weber (outgoing nurse) on [facility]. Report included the following information SBAR, Kardex, Intake/Output and MAR. Shift Summary: Pt remained stable during shift. PRN Dilaudid x4, Zofran and Benadryl x2. No BM during night. On tele, NSR. IV infiltrated at 0650. Issues for physician to address: Oncology Shift Note   Admission Date 12/12/2018   Admission Diagnosis Sickle cell crisis ([facility] [address]   Code Status Full Code   Consults IP CONSULT TO HOSPITALIST  IP CONSULT TO ONCOLOGY      Cardiac Monitoring [x] Yes [] No      Purposeful Hourly Rounding [x] Yes    Mukund Score Total Score: 1   Mukund score 3 or > [] Bed Alarm [] Avasys [] 1:1 sitter [] Patient refused (Place signed refusal form in chart)      Pain Managed [x] Yes [] No    Key Pain Meds             HYDROcodone-acetaminophen (NORCO)  mg tablet Take 1 Tab by mouth every six (6) hours as needed. Max Daily Amount: 4 Tabs. fentaNYL (DURAGESIC) 75 mcg/hr 1 Patch by TransDERmal route every seventy-two (72) hours. Max Daily Amount: 1 Patch. Influenza Vaccine Received Flu Vaccine for Current Season (usually Sept-March): Yes           Oxygen needs? [x] Room air Oxygen @  []1L    []2L    []3L   []4L    []5L   []6L     Use home O2? [] Yes [x] No  Perform O2 challenge test using  smartphrase (.oxygenchallenge)      Last bowel movement Last Bowel Movement Date: 12/11/18  bowel movement      Urinary Catheter             LDAs               Peripheral IV Anterior;Right Wrist (Active)   Site Assessment Clean, dry, & intact 12/16/2018  2:30 AM   Phlebitis Assessment 0 12/16/2018  2:30 AM   Infiltration Assessment 0 12/16/2018  2:30 AM   Dressing Status Clean, dry, & intact 12/16/2018  2:30 AM   Dressing Type Transparent;Tape 12/16/2018  2:30 AM   Hub Color/Line Status Yellow; Infusing 12/16/2018  2:30 AM   Action Taken Open ports on tubing capped 12/13/2018  8:00 PM   Alcohol Cap Used Yes 12/13/2018  3:13 PM                         Readmission Risk Assessment Tool Score Medium Risk            19       Total Score        3 Has Seen PCP in Last 6 Months (Yes=3, No=0)    9 IP Visits Last 12 Months (1-3=4, 4=9, >4=11)    5 Pt. Coverage (Medicare=5 , Medicaid, or Self-Pay=4)    2 Charlson Comorbidity Score (Age + Comorbid Conditions)        Criteria that do not apply:    . Living with Significant Other. Assisted Living. LTAC. SNF.  or   Rehab    Patient Length of Stay (>5 days = 3)       Expected Length of Stay 2d 16h   Actual Length of Stay [address]

## 2018-12-16 NOTE — PROGRESS NOTES
3699: IV, 24 left wrist, infiltrated. RN attempted to located new IV site. Pt informed nurse that PICC has to place IV with ultrasound. Will let day shift and contact PICC team to assist with new IV placement.

## 2018-12-16 NOTE — PROGRESS NOTES
Hospital Progress Note    NAME:  Power Barrera   :   1960   MRN:  [id number]     Date/Time:  2018 1:03 PM    Plan:   1. Pain control  2. Iv fluids  Risk of Deterioration: Low  []           Moderate  [x]           High  []                 Assessment:   Principal Problem:    Sickle cell crisis ([facility] [address] (2017)     Hb 6.3 - patient prefers to wait    Active Problems:    Hypertension (10/3/2014)      Depression (10/5/2014)         Admitting note:66year-old female with past medical history of sickle cell anemia, hypertension, gastroesophageal reflux disease is coming to the hospital with chief complaints of pain in both ankles, knees and also lower abdominal pain.  Patient reports being in her usual state of health until about 2 days ago. Sid Crouch reports she was spending a lot of time in the cold and since then started having pain which is mostly in the joints and also in the lower abdominal area.  Does not report any nausea, vomiting or diarrhea.  Denies chest pain, shortness of breath or palpitations.  Denies headache, weakness, tingling or numbness.     On arrival to the hospital her vital signs were within normal limits.  On lab work she was noted to have a hemoglobin of 8.1, reticulocyte count of 3.8.  BMP showed a potassium of 2.9 and creatinine of 1.80.  Chest x-ray is pending at this time. Sid Crouch was given IV fluids in the emergency department and also Dilaudid.     Hospitalist admitted for work up and evaluation of the above problems.          Subjective/interium history     Continues with ankle,knee,groin pain  11 Point Review of Systems:   Negative except no cp/sob    []            Unable to obtain ROS due to:       []            mental status change []            sedated []            intubated     Social History     Tobacco Use    Smoking status: Never Smoker    Smokeless tobacco: Never Used   Substance Use Topics    Alcohol use: No     Medications reviewed:  Current Facility-Administered Medications   Medication Dose Route Frequency    dextrose 5% - 0.45% NaCl with KCl 20 mEq/L infusion  100 mL/hr IntraVENous CONTINUOUS    diphenhydrAMINE (BENADRYL) injection 25 mg  25 mg IntraVENous Q4H PRN    HYDROmorphone (PF) (DILAUDID) injection 1 mg  1 mg IntraVENous Q3H PRN    citalopram (CELEXA) tablet 10 mg  10 mg Oral QPM    cyclobenzaprine (FLEXERIL) tablet 10 mg  10 mg Oral TID PRN    fentaNYL (DURAGESIC) 75 mcg/hr patch 1 Patch  1 Patch TransDERmal Y05M    folic acid (FOLVITE) tablet 1 mg  1 mg Oral DAILY    HYDROcodone-acetaminophen (NORCO)  mg tablet 1 Tab  1 Tab Oral Q6H PRN    pantoprazole (PROTONIX) tablet 40 mg  40 mg Oral DAILY    sodium chloride (NS) flush 5-10 mL  5-10 mL IntraVENous Q8H    sodium chloride (NS) flush 5-10 mL  5-10 mL IntraVENous PRN    acetaminophen (TYLENOL) tablet 650 mg  650 mg Oral Q6H PRN    ondansetron (ZOFRAN) injection 4 mg  4 mg IntraVENous Q6H PRN    docusate sodium (COLACE) capsule 100 mg  100 mg Oral DAILY PRN    enoxaparin (LOVENOX) injection 40 mg  40 mg SubCUTAneous Q24H    polyethylene glycol (MIRALAX) packet 17 g  17 g Oral BID PRN        Objective:   Vitals:  Visit Vitals  /63 (BP 1 Location: Left arm, BP Patient Position: Sitting)   Pulse 91   Temp 99.7 °F (37.6 °C)   Resp 18   Ht 5' 11\" (1.803 m)   Wt 169 lb 12.1 oz (77 kg)   SpO2 98%   Breastfeeding? No   BMI 23.68 kg/m²     Temp (24hrs), Av °F (37.2 °C), Min:98.7 °F (37.1 °C), Max:99.7 °F (37.6 °C)      O2 Device: Room air    Last 24hr Input/Output:    Intake/Output Summary (Last 24 hours) at 2018 0542  Last data filed at 2018 0230  Gross per 24 hour   Intake 2323.34 ml   Output    Net 2323.34 ml        PHYSICAL EXAM:  General:    Alert, cooperative, no distress, appears stated age. Head:   Normocephalic, without obvious abnormality, atraumatic. Eyes:   Conjunctivae/corneas clear. PERRLA  Nose:  Nares normal. No drainage or sinus tenderness.   Throat: Lips, mucosa, and tongue normal.  No Thrush  Neck:  Supple, symmetrical,  no adenopathy, thyroid: non tender    no carotid bruit and no JVD. Back:    Symmetric,  No CVA tenderness. Lungs:   Clear to auscultation bilaterally. No Wheezing or Rhonchi. No rales. Chest wall:  No tenderness or deformity. No Accessory muscle use. Heart:   Regular rate and rhythm,  no murmur, rub or gallop. Abdomen:   Soft, non-tender. Not distended.   Bowel sounds normal. No masses         Lab Data Reviewed:    Recent Labs     12/16/18  0335 12/15/18  0346 12/14/18  0324   WBC 8.9 9.8 10.2   HGB 6.3* 6.1* 7.0*   HCT 18.7* 18.2* 20.5*    207 207     Recent Labs     12/16/18  0335 12/15/18  0346 12/14/18  0324    139 139   K 4.0 3.8 3.7   * 111* 109*   CO2 25 23 23   GLU 91 108* 93   BUN 4* 4* 9   CREA 0.76 0.81 0.91   CA 8.3* 7.8* 8.5     Lab Results   Component Value Date/Time    Glucose (POC) 98 07/24/2018 07:39 AM    Glucose (POC) 112 (H) 07/23/2018 09:03 PM    Glucose (POC) 136 (H) 07/23/2018 04:31 PM    Glucose (POC) 95 07/23/2018 12:07 PM    Glucose (POC) 103 (H) 07/23/2018 08:27 AM       ___________________________________________________  ___________________________________________________    Attending Physician: Erum Bajwa MD

## 2018-12-17 LAB
ANION GAP SERPL CALC-SCNC: 5 MMOL/L (ref 5–15)
BASOPHILS # BLD: 0 K/UL (ref 0–0.1)
BASOPHILS NFR BLD: 0 % (ref 0–1)
BUN SERPL-MCNC: 5 MG/DL (ref 6–20)
BUN/CREAT SERPL: 6 (ref 12–20)
CALCIUM SERPL-MCNC: 8.4 MG/DL (ref 8.5–10.1)
CHLORIDE SERPL-SCNC: 109 MMOL/L (ref 97–108)
CO2 SERPL-SCNC: 26 MMOL/L (ref 21–32)
CREAT SERPL-MCNC: 0.78 MG/DL (ref 0.55–1.02)
DIFFERENTIAL METHOD BLD: ABNORMAL
EOSINOPHIL # BLD: 0.5 K/UL (ref 0–0.4)
EOSINOPHIL NFR BLD: 6 % (ref 0–7)
ERYTHROCYTE [DISTWIDTH] IN BLOOD BY AUTOMATED COUNT: 17.9 % (ref 11.5–14.5)
GLUCOSE SERPL-MCNC: 89 MG/DL (ref 65–100)
HCT VFR BLD AUTO: 19.8 % (ref 35–47)
HGB BLD-MCNC: 6.5 G/DL (ref 11.5–16)
IMM GRANULOCYTES # BLD: 0 K/UL (ref 0–0.04)
IMM GRANULOCYTES NFR BLD AUTO: 0 % (ref 0–0.5)
LYMPHOCYTES # BLD: 4.3 K/UL (ref 0.8–3.5)
LYMPHOCYTES NFR BLD: 50 % (ref 12–49)
MCH RBC QN AUTO: 31 PG (ref 26–34)
MCHC RBC AUTO-ENTMCNC: 32.8 G/DL (ref 30–36.5)
MCV RBC AUTO: 94.3 FL (ref 80–99)
MONOCYTES # BLD: 1.2 K/UL (ref 0–1)
MONOCYTES NFR BLD: 14 % (ref 5–13)
NEUTS SEG # BLD: 2.7 K/UL (ref 1.8–8)
NEUTS SEG NFR BLD: 31 % (ref 32–75)
NRBC # BLD: 0.2 K/UL (ref 0–0.01)
NRBC BLD-RTO: 2.3 PER 100 WBC
PLATELET # BLD AUTO: 222 K/UL (ref 150–400)
PMV BLD AUTO: 11.5 FL (ref 8.9–12.9)
POTASSIUM SERPL-SCNC: 4.2 MMOL/L (ref 3.5–5.1)
RBC # BLD AUTO: 2.1 M/UL (ref 3.8–5.2)
SODIUM SERPL-SCNC: 140 MMOL/L (ref 136–145)
WBC # BLD AUTO: 8.8 K/UL (ref 3.6–11)

## 2018-12-17 PROCEDURE — 80048 BASIC METABOLIC PNL TOTAL CA: CPT

## 2018-12-17 PROCEDURE — 74011250636 HC RX REV CODE- 250/636: Performed by: INTERNAL MEDICINE

## 2018-12-17 PROCEDURE — 74011250637 HC RX REV CODE- 250/637: Performed by: INTERNAL MEDICINE

## 2018-12-17 PROCEDURE — 74011000250 HC RX REV CODE- 250: Performed by: INTERNAL MEDICINE

## 2018-12-17 PROCEDURE — 94760 N-INVAS EAR/PLS OXIMETRY 1: CPT

## 2018-12-17 PROCEDURE — 74011250636 HC RX REV CODE- 250/636: Performed by: EMERGENCY MEDICINE

## 2018-12-17 PROCEDURE — 85025 COMPLETE CBC W/AUTO DIFF WBC: CPT

## 2018-12-17 PROCEDURE — 36415 COLL VENOUS BLD VENIPUNCTURE: CPT

## 2018-12-17 PROCEDURE — 65660000000 HC RM CCU STEPDOWN

## 2018-12-17 RX ORDER — DEXTROSE, SODIUM CHLORIDE, AND POTASSIUM CHLORIDE 5; .45; .15 G/100ML; G/100ML; G/100ML
25 INJECTION INTRAVENOUS CONTINUOUS
Status: DISCONTINUED | OUTPATIENT
Start: 2018-12-17 | End: 2019-01-02

## 2018-12-17 RX ADMIN — HYDROMORPHONE HYDROCHLORIDE 1 MG: 1 INJECTION, SOLUTION INTRAMUSCULAR; INTRAVENOUS; SUBCUTANEOUS at 10:50

## 2018-12-17 RX ADMIN — CYCLOBENZAPRINE HYDROCHLORIDE 10 MG: 10 TABLET, FILM COATED ORAL at 18:44

## 2018-12-17 RX ADMIN — DOCUSATE SODIUM 100 MG: 100 CAPSULE, LIQUID FILLED ORAL at 16:04

## 2018-12-17 RX ADMIN — HYDROMORPHONE HYDROCHLORIDE 1 MG: 1 INJECTION, SOLUTION INTRAMUSCULAR; INTRAVENOUS; SUBCUTANEOUS at 13:19

## 2018-12-17 RX ADMIN — Medication 10 ML: at 09:49

## 2018-12-17 RX ADMIN — DIPHENHYDRAMINE HYDROCHLORIDE 25 MG: 50 INJECTION, SOLUTION INTRAMUSCULAR; INTRAVENOUS at 03:58

## 2018-12-17 RX ADMIN — HYDROCODONE BITARTRATE AND ACETAMINOPHEN 1 TABLET: 10; 325 TABLET ORAL at 17:12

## 2018-12-17 RX ADMIN — HYDROCODONE BITARTRATE AND ACETAMINOPHEN 1 TABLET: 10; 325 TABLET ORAL at 09:47

## 2018-12-17 RX ADMIN — Medication 10 ML: at 01:21

## 2018-12-17 RX ADMIN — ONDANSETRON 4 MG: 2 INJECTION INTRAMUSCULAR; INTRAVENOUS at 16:04

## 2018-12-17 RX ADMIN — FOLIC ACID 1 MG: 1 TABLET ORAL at 09:47

## 2018-12-17 RX ADMIN — ENOXAPARIN SODIUM 40 MG: 40 INJECTION SUBCUTANEOUS at 09:47

## 2018-12-17 RX ADMIN — ONDANSETRON 4 MG: 2 INJECTION INTRAMUSCULAR; INTRAVENOUS at 09:47

## 2018-12-17 RX ADMIN — DIPHENHYDRAMINE HYDROCHLORIDE 25 MG: 50 INJECTION, SOLUTION INTRAMUSCULAR; INTRAVENOUS at 22:51

## 2018-12-17 RX ADMIN — HYDROMORPHONE HYDROCHLORIDE 1 MG: 1 INJECTION, SOLUTION INTRAMUSCULAR; INTRAVENOUS; SUBCUTANEOUS at 01:21

## 2018-12-17 RX ADMIN — HYDROMORPHONE HYDROCHLORIDE 1 MG: 1 INJECTION, SOLUTION INTRAMUSCULAR; INTRAVENOUS; SUBCUTANEOUS at 18:59

## 2018-12-17 RX ADMIN — PANTOPRAZOLE SODIUM 40 MG: 40 TABLET, DELAYED RELEASE ORAL at 09:47

## 2018-12-17 RX ADMIN — POLYETHYLENE GLYCOL 3350 17 G: 17 POWDER, FOR SOLUTION ORAL at 09:47

## 2018-12-17 RX ADMIN — HYDROMORPHONE HYDROCHLORIDE 1 MG: 1 INJECTION, SOLUTION INTRAMUSCULAR; INTRAVENOUS; SUBCUTANEOUS at 03:58

## 2018-12-17 RX ADMIN — DEXTROSE MONOHYDRATE, SODIUM CHLORIDE, AND POTASSIUM CHLORIDE 100 ML/HR: 50; 4.5; 1.49 INJECTION, SOLUTION INTRAVENOUS at 14:15

## 2018-12-17 RX ADMIN — Medication 10 ML: at 14:16

## 2018-12-17 RX ADMIN — HYDROMORPHONE HYDROCHLORIDE 1 MG: 1 INJECTION, SOLUTION INTRAMUSCULAR; INTRAVENOUS; SUBCUTANEOUS at 07:17

## 2018-12-17 RX ADMIN — DIPHENHYDRAMINE HYDROCHLORIDE 25 MG: 50 INJECTION, SOLUTION INTRAMUSCULAR; INTRAVENOUS at 16:04

## 2018-12-17 RX ADMIN — ONDANSETRON 4 MG: 2 INJECTION INTRAMUSCULAR; INTRAVENOUS at 22:51

## 2018-12-17 RX ADMIN — DIPHENHYDRAMINE HYDROCHLORIDE 25 MG: 50 INJECTION, SOLUTION INTRAMUSCULAR; INTRAVENOUS at 09:47

## 2018-12-17 RX ADMIN — HYDROMORPHONE HYDROCHLORIDE 1 MG: 1 INJECTION, SOLUTION INTRAMUSCULAR; INTRAVENOUS; SUBCUTANEOUS at 22:51

## 2018-12-17 RX ADMIN — ONDANSETRON 4 MG: 2 INJECTION INTRAMUSCULAR; INTRAVENOUS at 03:58

## 2018-12-17 RX ADMIN — HYDROMORPHONE HYDROCHLORIDE 1 MG: 1 INJECTION, SOLUTION INTRAMUSCULAR; INTRAVENOUS; SUBCUTANEOUS at 16:06

## 2018-12-17 RX ADMIN — CITALOPRAM HYDROBROMIDE 10 MG: 20 TABLET ORAL at 17:12

## 2018-12-17 NOTE — PROGRESS NOTES
Oncology Nursing Communication Tool  6:57 PM  12/17/2018     Bedside\" shift change report given to Kristen Wilson RN (incoming nurse) by Epifanio Bhatia (outgoing nurse) on [facility]. Report included the following information        Shift Summary: prn meds all day, O2 added to regimen, trying to see if flexeril helps with pain      Issues for physician to address: pain management       Oncology Shift Note   Admission Date 12/12/2018   Admission Diagnosis Sickle cell crisis ([facility] [address]   Code Status Full Code   Consults IP CONSULT TO HOSPITALIST  IP CONSULT TO ONCOLOGY      Cardiac Monitoring [] Yes [] No      Purposeful Hourly Rounding [] Yes    Mukund Score Total Score: 1   Mukund score 3 or > [] Bed Alarm [] Avasys [] 1:1 sitter [] Patient refused (Place signed refusal form in chart)      Pain Managed [] Yes [] No    Key Pain Meds             HYDROcodone-acetaminophen (NORCO)  mg tablet Take 1 Tab by mouth every six (6) hours as needed. Max Daily Amount: 4 Tabs. fentaNYL (DURAGESIC) 75 mcg/hr 1 Patch by TransDERmal route every seventy-two (72) hours. Max Daily Amount: 1 Patch. Influenza Vaccine Received Flu Vaccine for Current Season (usually Sept-March): Yes           Oxygen needs? [] Room air Oxygen @  []1L    []2L    []3L   []4L    []5L   []6L     Use home O2? [] Yes [] No  Perform O2 challenge test using  smartphrase (.oxygenchallenge)      Last bowel movement Last Bowel Movement Date: 12/12/18  bowel movement      Urinary Catheter             LDAs               Peripheral IV 12/16/18 (Active)   Site Assessment Clean, dry, & intact 12/17/2018  9:46 AM   Phlebitis Assessment 0 12/17/2018  9:46 AM   Infiltration Assessment 0 12/17/2018  9:46 AM   Dressing Status Clean, dry, & intact 12/17/2018  9:46 AM   Dressing Type Tape;Transparent 12/17/2018  9:46 AM   Hub Color/Line Status Blue; Infusing 12/17/2018  9:46 AM                         Readmission Risk Assessment Tool Score High Risk            22 Total Score        3 Has Seen PCP in Last 6 Months (Yes=3, No=0)    3 Patient Length of Stay (>5 days = 3)    9 IP Visits Last 12 Months (1-3=4, 4=9, >4=11)    5 Pt. Coverage (Medicare=5 , Medicaid, or Self-Pay=4)    2 Charlson Comorbidity Score (Age + Comorbid Conditions)        Criteria that do not apply:    . Living with Significant Other. Assisted Living. LTAC. SNF.  or   Rehab       Expected Length of Stay 2d 16h   Actual Length of Stay [address] [facility]

## 2018-12-17 NOTE — PROGRESS NOTES
Oncology Nursing Communication Tool  6:51 AM  12/17/2018     Bedside shift change report given to Courtney Leigh RN (incoming nurse) by Julio Frank (outgoing nurse) on [facility]. Report included the following information SBAR, Kardex, Intake/Output, MAR and Recent Results. Shift Summary: PRN dilaudid, benadryl and zofran given. Labs drawn. hgb 6.5      Issues for physician to address: none         Oncology Shift Note   Admission Date 12/12/2018   Admission Diagnosis Sickle cell crisis ([facility] [address]   Code Status Full Code   Consults IP CONSULT TO HOSPITALIST  IP CONSULT TO ONCOLOGY      Cardiac Monitoring [x] Yes [] No      Purposeful Hourly Rounding [x] Yes    Mukund Score Total Score: 1   Mukund score 3 or > [] Bed Alarm [] Avasys [] 1:1 sitter [] Patient refused (Place signed refusal form in chart)      Pain Managed [x] Yes [] No    Key Pain Meds             HYDROcodone-acetaminophen (NORCO)  mg tablet Take 1 Tab by mouth every six (6) hours as needed. Max Daily Amount: 4 Tabs. fentaNYL (DURAGESIC) 75 mcg/hr 1 Patch by TransDERmal route every seventy-two (72) hours. Max Daily Amount: 1 Patch. Influenza Vaccine Received Flu Vaccine for Current Season (usually Sept-March): Yes           Oxygen needs?  [x] Room air Oxygen @  []1L    []2L    []3L   []4L    []5L   []6L     Use home O2? [] Yes [] No  Perform O2 challenge test using  smartphrase (.oxygenchallenge)      Last bowel movement Last Bowel Movement Date: 12/12/18  bowel movement      Urinary Catheter             LDAs               Peripheral IV 12/16/18 (Active)   Site Assessment Clean, dry, & intact 12/17/2018  2:12 AM   Phlebitis Assessment 0 12/17/2018  2:12 AM   Infiltration Assessment 0 12/17/2018  2:12 AM   Dressing Status Clean, dry, & intact 12/17/2018  2:12 AM   Dressing Type Tape;Transparent 12/17/2018  2:12 AM   Hub Color/Line Status Pink;Flushed;Patent 12/17/2018  2:12 AM                         Readmission Risk Assessment Tool Score Medium Risk            19       Total Score        3 Has Seen PCP in Last 6 Months (Yes=3, No=0)    9 IP Visits Last 12 Months (1-3=4, 4=9, >4=11)    5 Pt. Coverage (Medicare=5 , Medicaid, or Self-Pay=4)    2 Charlson Comorbidity Score (Age + Comorbid Conditions)        Criteria that do not apply:    . Living with Significant Other. Assisted Living. LTAC. SNF.  or   Rehab    Patient Length of Stay (>5 days = 3)       Expected Length of Stay 2d 16h   Actual Length of Stay [address]

## 2018-12-17 NOTE — PROGRESS NOTES
General Daily Progress Note    Admit Date: 12/12/2018    Subjective:     Patient continues to complain of pain. Current Facility-Administered Medications   Medication Dose Route Frequency    dextrose 5% - 0.45% NaCl with KCl 20 mEq/L infusion  100 mL/hr IntraVENous CONTINUOUS    diphenhydrAMINE (BENADRYL) injection 25 mg  25 mg IntraVENous Q4H PRN    HYDROmorphone (PF) (DILAUDID) injection 1 mg  1 mg IntraVENous Q3H PRN    citalopram (CELEXA) tablet 10 mg  10 mg Oral QPM    cyclobenzaprine (FLEXERIL) tablet 10 mg  10 mg Oral TID PRN    fentaNYL (DURAGESIC) 75 mcg/hr patch 1 Patch  1 Patch TransDERmal G53E    folic acid (FOLVITE) tablet 1 mg  1 mg Oral DAILY    HYDROcodone-acetaminophen (NORCO)  mg tablet 1 Tab  1 Tab Oral Q6H PRN    pantoprazole (PROTONIX) tablet 40 mg  40 mg Oral DAILY    sodium chloride (NS) flush 5-10 mL  5-10 mL IntraVENous Q8H    sodium chloride (NS) flush 5-10 mL  5-10 mL IntraVENous PRN    acetaminophen (TYLENOL) tablet 650 mg  650 mg Oral Q6H PRN    ondansetron (ZOFRAN) injection 4 mg  4 mg IntraVENous Q6H PRN    docusate sodium (COLACE) capsule 100 mg  100 mg Oral DAILY PRN    enoxaparin (LOVENOX) injection 40 mg  40 mg SubCUTAneous Q24H    polyethylene glycol (MIRALAX) packet 17 g  17 g Oral BID PRN        Review of Systems  A comprehensive review of systems was negative. Objective:     Patient Vitals for the past 24 hrs:   BP Temp Pulse Resp SpO2   12/17/18 0745 108/50 99 °F (37.2 °C) 79 16 100 %   12/17/18 0314 109/68 98.4 °F (36.9 °C) 81 17 100 %   12/16/18 2340 103/58 99 °F (37.2 °C) 83 16 94 %   12/16/18 1857 91/55 99.2 °F (37.3 °C) 91 16 97 %   12/16/18 1528 124/48 99.1 °F (37.3 °C) 88 16 100 %   12/16/18 1108 124/69 99.3 °F (37.4 °C) 86 16 98 %     No intake/output data recorded.   12/15 1901 - 12/17 0700  In: 2323.3 [I.V.:2323.3]  Out: -     Physical Exam:   Visit Vitals  /50 (BP 1 Location: Right arm, BP Patient Position: At rest)   Pulse 79   Temp 99 °F (37.2 °C)   Resp 16   Ht 5' 11\" (1.803 m)   Wt 169 lb 12.1 oz (77 kg)   SpO2 100%   Breastfeeding? No   BMI 23.68 kg/m²     General appearance: alert, cooperative, no distress, appears stated age  Neck: supple, symmetrical, trachea midline, no adenopathy, thyroid: not enlarged, symmetric, no tenderness/mass/nodules, no carotid bruit and no JVD  Lungs: clear to auscultation bilaterally  Heart: regular rate and rhythm, S1, S2 normal, no murmur, click, rub or gallop  Abdomen: soft, non-tender. Bowel sounds normal. No masses,  no organomegaly  Extremities: extremities normal, atraumatic, no cyanosis or edema        Data Review   Recent Results (from the past 24 hour(s))   METABOLIC PANEL, BASIC    Collection Time: 12/17/18  4:04 AM   Result Value Ref Range    Sodium 140 136 - 145 mmol/L    Potassium 4.2 3.5 - 5.1 mmol/L    Chloride 109 (H) 97 - 108 mmol/L    CO2 26 21 - 32 mmol/L    Anion gap 5 5 - 15 mmol/L    Glucose 89 65 - 100 mg/dL    BUN 5 (L) 6 - 20 MG/DL    Creatinine 0.78 0.55 - 1.02 MG/DL    BUN/Creatinine ratio 6 (L) 12 - 20      GFR est AA >60 >60 ml/min/1.73m2    GFR est non-AA >60 >60 ml/min/1.73m2    Calcium 8.4 (L) 8.5 - 10.1 MG/DL   CBC WITH AUTOMATED DIFF    Collection Time: 12/17/18  4:04 AM   Result Value Ref Range    WBC 8.8 3.6 - 11.0 K/uL    RBC 2.10 (L) 3.80 - 5.20 M/uL    HGB 6.5 (L) 11.5 - 16.0 g/dL    HCT 19.8 (L) 35.0 - 47.0 %    MCV 94.3 80.0 - 99.0 FL    MCH 31.0 26.0 - 34.0 PG    MCHC 32.8 30.0 - 36.5 g/dL    RDW 17.9 (H) 11.5 - 14.5 %    PLATELET 359 289 - 468 K/uL    MPV 11.5 8.9 - 12.9 FL    NRBC 2.3 (H) 0  WBC    ABSOLUTE NRBC 0.20 (H) 0.00 - 0.01 K/uL    NEUTROPHILS 31 (L) 32 - 75 %    LYMPHOCYTES 50 (H) 12 - 49 %    MONOCYTES 14 (H) 5 - 13 %    EOSINOPHILS 6 0 - 7 %    BASOPHILS 0 0 - 1 %    IMMATURE GRANULOCYTES 0 0.0 - 0.5 %    ABS. NEUTROPHILS 2.7 1.8 - 8.0 K/UL    ABS. LYMPHOCYTES 4.3 (H) 0.8 - 3.5 K/UL    ABS. MONOCYTES 1.2 (H) 0.0 - 1.0 K/UL    ABS.  EOSINOPHILS 0.5 (H) 0.0 - 0.4 K/UL    ABS. BASOPHILS 0.0 0.0 - 0.1 K/UL    ABS. IMM. GRANS. 0.0 0.00 - 0.04 K/UL    DF AUTOMATED             Assessment:     Principal Problem:    Sickle cell crisis ([facility] [address] (5/22/2017)    Active Problems:    Hypertension (10/3/2014)      Depression (10/5/2014)        Plan:     1. Continues with painful crises. No change in analgesics for now. 2.  Continue hydration.

## 2018-12-17 NOTE — PROGRESS NOTES
Hematology Oncology Progress Note         Follow up for: SC crisis    Chart notes reviewed since last visit. Case discussed with following: nursing staff. Patient complains of the following: Still having pain in her legs, improved since admission. Still needing IV pain medications q 3 hrs  Additional concerns noted by the staff:     Patient Vitals for the past 24 hrs:   BP Temp Pulse Resp SpO2   12/17/18 0745 108/50 99 °F (37.2 °C) 79 16 100 %   12/17/18 0314 109/68 98.4 °F (36.9 °C) 81 17 100 %   12/16/18 2340 103/58 99 °F (37.2 °C) 83 16 94 %   12/16/18 1857 91/55 99.2 °F (37.3 °C) 91 16 97 %   12/16/18 1528 124/48 99.1 °F (37.3 °C) 88 16 100 %   12/16/18 1108 124/69 99.3 °F (37.4 °C) 86 16 98 %     ROS negative at present for 11 organ systems except as noted. Physical Examination:  Constitutional Alert, cooperative, oriented. Mood and affect appropriate. Appears close to chronological age. Well nourished. Well developed. Head Normocephalic; no scars   Eyes Conjunctivae and sclerae are clear and without icterus. Pupils are round   ENMT Sinuses are nontender. No oral exudates, ulcers, masses, thrush or mucositis. Oropharynx clear. Tongue normal.   Neck Supple without masses or thyromegaly. No jugular venous distension. Hematologic/Lymphatic No petechiae or purpura. No tender or palpable lymph nodes noted   Respiratory Lungs are clear to auscultation without rhonchi or wheezing. Cardiovascular Regular rate and rhythm of heart without murmurs, gallops or rubs. Chest / Line Site Chest is symmetric with no chest wall deformities. Abdomen Non-tender, non-distended, no masses, ascites or hepatosplenomegaly. Good bowel sounds. Musculoskeletal No tenderness or swelling, normal range of motion without obvious weakness. Extremities No visible deformities, no cyanosis, clubbing or edema. Skin No rashes, scars, or lesions suggestive of malignancy. No petechiae, purpura, or ecchymoses.  No excoriations. Neurologic No sensory or motor deficits noted but not specifically tested. Psychiatric Alert and oriented. Coherent speech. Verbalizes understanding of our discussions today. Labs:  Recent Results (from the past 24 hour(s))   METABOLIC PANEL, BASIC    Collection Time: 12/17/18  4:04 AM   Result Value Ref Range    Sodium 140 136 - 145 mmol/L    Potassium 4.2 3.5 - 5.1 mmol/L    Chloride 109 (H) 97 - 108 mmol/L    CO2 26 21 - 32 mmol/L    Anion gap 5 5 - 15 mmol/L    Glucose 89 65 - 100 mg/dL    BUN 5 (L) 6 - 20 MG/DL    Creatinine 0.78 0.55 - 1.02 MG/DL    BUN/Creatinine ratio 6 (L) 12 - 20      GFR est AA >60 >60 ml/min/1.73m2    GFR est non-AA >60 >60 ml/min/1.73m2    Calcium 8.4 (L) 8.5 - 10.1 MG/DL   CBC WITH AUTOMATED DIFF    Collection Time: 12/17/18  4:04 AM   Result Value Ref Range    WBC 8.8 3.6 - 11.0 K/uL    RBC 2.10 (L) 3.80 - 5.20 M/uL    HGB 6.5 (L) 11.5 - 16.0 g/dL    HCT 19.8 (L) 35.0 - 47.0 %    MCV 94.3 80.0 - 99.0 FL    MCH 31.0 26.0 - 34.0 PG    MCHC 32.8 30.0 - 36.5 g/dL    RDW 17.9 (H) 11.5 - 14.5 %    PLATELET 092 894 - 270 K/uL    MPV 11.5 8.9 - 12.9 FL    NRBC 2.3 (H) 0  WBC    ABSOLUTE NRBC 0.20 (H) 0.00 - 0.01 K/uL    NEUTROPHILS 31 (L) 32 - 75 %    LYMPHOCYTES 50 (H) 12 - 49 %    MONOCYTES 14 (H) 5 - 13 %    EOSINOPHILS 6 0 - 7 %    BASOPHILS 0 0 - 1 %    IMMATURE GRANULOCYTES 0 0.0 - 0.5 %    ABS. NEUTROPHILS 2.7 1.8 - 8.0 K/UL    ABS. LYMPHOCYTES 4.3 (H) 0.8 - 3.5 K/UL    ABS. MONOCYTES 1.2 (H) 0.0 - 1.0 K/UL    ABS. EOSINOPHILS 0.5 (H) 0.0 - 0.4 K/UL    ABS. BASOPHILS 0.0 0.0 - 0.1 K/UL    ABS. IMM. GRANS. 0.0 0.00 - 0.04 K/UL    DF AUTOMATED         Assessment and Plan:   SC crisis - she appears to be doing fairly well. The pain appears to be fairly well controlled on her current doses of dilaudid and hydrocodone. Will hold on transfusion today. Crp low- doesn't appear to be active sicklening. Will monitor retic daily as well.  Hopefully, we can wean down pain meds soon. Continue folate.

## 2018-12-18 LAB
RETICS # AUTO: 0.18 M/UL (ref 0.02–0.08)
RETICS/RBC NFR AUTO: 9.5 % (ref 0.7–2.1)

## 2018-12-18 PROCEDURE — 85045 AUTOMATED RETICULOCYTE COUNT: CPT

## 2018-12-18 PROCEDURE — 74011250636 HC RX REV CODE- 250/636: Performed by: INTERNAL MEDICINE

## 2018-12-18 PROCEDURE — 74011000250 HC RX REV CODE- 250: Performed by: INTERNAL MEDICINE

## 2018-12-18 PROCEDURE — 65660000000 HC RM CCU STEPDOWN

## 2018-12-18 PROCEDURE — 74011250637 HC RX REV CODE- 250/637: Performed by: INTERNAL MEDICINE

## 2018-12-18 PROCEDURE — 36415 COLL VENOUS BLD VENIPUNCTURE: CPT

## 2018-12-18 PROCEDURE — 74011250636 HC RX REV CODE- 250/636: Performed by: EMERGENCY MEDICINE

## 2018-12-18 RX ADMIN — Medication 10 ML: at 15:03

## 2018-12-18 RX ADMIN — DEXTROSE MONOHYDRATE, SODIUM CHLORIDE, AND POTASSIUM CHLORIDE 100 ML/HR: 50; 4.5; 1.49 INJECTION, SOLUTION INTRAVENOUS at 00:26

## 2018-12-18 RX ADMIN — POLYETHYLENE GLYCOL 3350 17 G: 17 POWDER, FOR SOLUTION ORAL at 09:15

## 2018-12-18 RX ADMIN — PANTOPRAZOLE SODIUM 40 MG: 40 TABLET, DELAYED RELEASE ORAL at 09:08

## 2018-12-18 RX ADMIN — Medication 10 ML: at 00:26

## 2018-12-18 RX ADMIN — HYDROMORPHONE HYDROCHLORIDE 1 MG: 1 INJECTION, SOLUTION INTRAMUSCULAR; INTRAVENOUS; SUBCUTANEOUS at 09:08

## 2018-12-18 RX ADMIN — ONDANSETRON 4 MG: 2 INJECTION INTRAMUSCULAR; INTRAVENOUS at 11:29

## 2018-12-18 RX ADMIN — HYDROMORPHONE HYDROCHLORIDE 1 MG: 1 INJECTION, SOLUTION INTRAMUSCULAR; INTRAVENOUS; SUBCUTANEOUS at 15:03

## 2018-12-18 RX ADMIN — HYDROMORPHONE HYDROCHLORIDE 1 MG: 1 INJECTION, SOLUTION INTRAMUSCULAR; INTRAVENOUS; SUBCUTANEOUS at 20:51

## 2018-12-18 RX ADMIN — ENOXAPARIN SODIUM 40 MG: 40 INJECTION SUBCUTANEOUS at 09:09

## 2018-12-18 RX ADMIN — Medication 10 ML: at 20:51

## 2018-12-18 RX ADMIN — HYDROMORPHONE HYDROCHLORIDE 1 MG: 1 INJECTION, SOLUTION INTRAMUSCULAR; INTRAVENOUS; SUBCUTANEOUS at 12:07

## 2018-12-18 RX ADMIN — DIPHENHYDRAMINE HYDROCHLORIDE 25 MG: 50 INJECTION, SOLUTION INTRAMUSCULAR; INTRAVENOUS at 09:08

## 2018-12-18 RX ADMIN — DEXTROSE MONOHYDRATE, SODIUM CHLORIDE, AND POTASSIUM CHLORIDE 100 ML/HR: 50; 4.5; 1.49 INJECTION, SOLUTION INTRAVENOUS at 11:29

## 2018-12-18 RX ADMIN — ONDANSETRON 4 MG: 2 INJECTION INTRAMUSCULAR; INTRAVENOUS at 18:01

## 2018-12-18 RX ADMIN — DIPHENHYDRAMINE HYDROCHLORIDE 25 MG: 50 INJECTION, SOLUTION INTRAMUSCULAR; INTRAVENOUS at 18:01

## 2018-12-18 RX ADMIN — DOCUSATE SODIUM 100 MG: 100 CAPSULE, LIQUID FILLED ORAL at 09:15

## 2018-12-18 RX ADMIN — CYCLOBENZAPRINE HYDROCHLORIDE 10 MG: 10 TABLET, FILM COATED ORAL at 07:32

## 2018-12-18 RX ADMIN — HYDROCODONE BITARTRATE AND ACETAMINOPHEN 1 TABLET: 10; 325 TABLET ORAL at 22:10

## 2018-12-18 RX ADMIN — HYDROCODONE BITARTRATE AND ACETAMINOPHEN 1 TABLET: 10; 325 TABLET ORAL at 00:23

## 2018-12-18 RX ADMIN — DIPHENHYDRAMINE HYDROCHLORIDE 25 MG: 50 INJECTION, SOLUTION INTRAMUSCULAR; INTRAVENOUS at 03:07

## 2018-12-18 RX ADMIN — DEXTROSE MONOHYDRATE, SODIUM CHLORIDE, AND POTASSIUM CHLORIDE 100 ML/HR: 50; 4.5; 1.49 INJECTION, SOLUTION INTRAVENOUS at 21:45

## 2018-12-18 RX ADMIN — HYDROMORPHONE HYDROCHLORIDE 1 MG: 1 INJECTION, SOLUTION INTRAMUSCULAR; INTRAVENOUS; SUBCUTANEOUS at 18:01

## 2018-12-18 RX ADMIN — CITALOPRAM HYDROBROMIDE 10 MG: 20 TABLET ORAL at 18:01

## 2018-12-18 RX ADMIN — Medication 10 ML: at 05:57

## 2018-12-18 RX ADMIN — HYDROMORPHONE HYDROCHLORIDE 1 MG: 1 INJECTION, SOLUTION INTRAMUSCULAR; INTRAVENOUS; SUBCUTANEOUS at 05:57

## 2018-12-18 RX ADMIN — HYDROCODONE BITARTRATE AND ACETAMINOPHEN 1 TABLET: 10; 325 TABLET ORAL at 07:32

## 2018-12-18 RX ADMIN — HYDROMORPHONE HYDROCHLORIDE 1 MG: 1 INJECTION, SOLUTION INTRAMUSCULAR; INTRAVENOUS; SUBCUTANEOUS at 03:07

## 2018-12-18 RX ADMIN — ONDANSETRON 4 MG: 2 INJECTION INTRAMUSCULAR; INTRAVENOUS at 05:57

## 2018-12-18 RX ADMIN — HYDROCODONE BITARTRATE AND ACETAMINOPHEN 1 TABLET: 10; 325 TABLET ORAL at 16:17

## 2018-12-18 RX ADMIN — FOLIC ACID 1 MG: 1 TABLET ORAL at 09:09

## 2018-12-18 NOTE — ROUTINE PROCESS
RAPID RESPONSE TEAM    Notified of patient's BP by RN while assessing another patient on the unit. Pt is asymptomatic and with good mentation. Last Hb was 6.5. Norco and Flexeril given around 0730. Physician has been paged by unit staff. Episode of hypotension appears to be transient and resolved spontaneously. Please call back fi needed. Patient to stay in room for now.     Follow up at 0930 demonstrates /56

## 2018-12-18 NOTE — PROGRESS NOTES
General Daily Progress Note    Admit Date: 12/12/2018    Subjective:     Patient continues to complain of pain. Current Facility-Administered Medications   Medication Dose Route Frequency    dextrose 5% - 0.45% NaCl with KCl 20 mEq/L infusion  100 mL/hr IntraVENous CONTINUOUS    diphenhydrAMINE (BENADRYL) injection 25 mg  25 mg IntraVENous Q4H PRN    HYDROmorphone (PF) (DILAUDID) injection 1 mg  1 mg IntraVENous Q3H PRN    citalopram (CELEXA) tablet 10 mg  10 mg Oral QPM    cyclobenzaprine (FLEXERIL) tablet 10 mg  10 mg Oral TID PRN    fentaNYL (DURAGESIC) 75 mcg/hr patch 1 Patch  1 Patch TransDERmal H35F    folic acid (FOLVITE) tablet 1 mg  1 mg Oral DAILY    HYDROcodone-acetaminophen (NORCO)  mg tablet 1 Tab  1 Tab Oral Q6H PRN    pantoprazole (PROTONIX) tablet 40 mg  40 mg Oral DAILY    sodium chloride (NS) flush 5-10 mL  5-10 mL IntraVENous Q8H    sodium chloride (NS) flush 5-10 mL  5-10 mL IntraVENous PRN    acetaminophen (TYLENOL) tablet 650 mg  650 mg Oral Q6H PRN    ondansetron (ZOFRAN) injection 4 mg  4 mg IntraVENous Q6H PRN    docusate sodium (COLACE) capsule 100 mg  100 mg Oral DAILY PRN    enoxaparin (LOVENOX) injection 40 mg  40 mg SubCUTAneous Q24H    polyethylene glycol (MIRALAX) packet 17 g  17 g Oral BID PRN        Review of Systems  A comprehensive review of systems was negative. Objective:     Patient Vitals for the past 24 hrs:   BP Temp Pulse Resp SpO2   12/18/18 0812 111/64       12/18/18 0803 96/59       12/18/18 0749 (!) 88/51 98.6 °F (37 °C) 83 18 99 %   12/18/18 0259 110/57 98.9 °F (37.2 °C) 93 18 100 %   12/17/18 2252 111/59 98.4 °F (36.9 °C) 95 18 99 %   12/17/18 2000 110/49 98.7 °F (37.1 °C) 85 18 100 %   12/17/18 1557 115/60 99.5 °F (37.5 °C) 84 18 100 %   12/17/18 1100 115/70 98.3 °F (36.8 °C) 82 16 100 %     No intake/output data recorded. No intake/output data recorded.     Physical Exam:   Visit Vitals  /64   Pulse 83   Temp 98.6 °F (37 °C) Resp 18   Ht 5' 11\" (1.803 m)   Wt 169 lb 12.1 oz (77 kg)   SpO2 99%   Breastfeeding? No   BMI 23.68 kg/m²     General appearance: alert, cooperative, no distress, appears stated age  Neck: supple, symmetrical, trachea midline, no adenopathy, thyroid: not enlarged, symmetric, no tenderness/mass/nodules, no carotid bruit and no JVD  Lungs: clear to auscultation bilaterally  Heart: regular rate and rhythm, S1, S2 normal, no murmur, click, rub or gallop  Abdomen: soft, non-tender. Bowel sounds normal. No masses,  no organomegaly  Extremities: extremities normal, atraumatic, no cyanosis or edema        Data Review   Recent Results (from the past 24 hour(s))   RETICULOCYTE COUNT    Collection Time: 12/18/18  3:17 AM   Result Value Ref Range    Reticulocyte count 9.5 (H) 0.7 - 2.1 %    Absolute Retic Cnt. 0.1826 (H) 0.0164 - 0.0776 M/ul           Assessment:     Principal Problem:    Sickle cell crisis ([facility] [address] (5/22/2017)    Active Problems:    Hypertension (10/3/2014)      Depression (10/5/2014)        Plan:     1. Continue painful crises. No change in treatment regimen for now. 2.  No other obvious complications. Will mobilize.

## 2018-12-18 NOTE — PROGRESS NOTES
Oncology Nursing Communication Tool  6:51 AM  12/18/2018     Bedside shift change report given to Ranjit Burris RN (incoming nurse) by Shlomo Cruz (outgoing nurse) on [facility]. Report included the following information SBAR, Kardex, Intake/Output, MAR and Recent Results. Shift Summary: no changes, gave PRN dilaudid, benadryl and zofran. Labs drawn. Issues for physician to address: none         Oncology Shift Note   Admission Date 12/12/2018   Admission Diagnosis Sickle cell crisis ([facility] [address]   Code Status Full Code   Consults IP CONSULT TO HOSPITALIST  IP CONSULT TO ONCOLOGY      Cardiac Monitoring [x] Yes [] No      Purposeful Hourly Rounding [x] Yes    Mukund Score Total Score: 1   Mukund score 3 or > [] Bed Alarm [] Avasys [] 1:1 sitter [] Patient refused (Place signed refusal form in chart)      Pain Managed [] Yes [x] No    Key Pain Meds             HYDROcodone-acetaminophen (NORCO)  mg tablet Take 1 Tab by mouth every six (6) hours as needed. Max Daily Amount: 4 Tabs. fentaNYL (DURAGESIC) 75 mcg/hr 1 Patch by TransDERmal route every seventy-two (72) hours. Max Daily Amount: 1 Patch. Influenza Vaccine Received Flu Vaccine for Current Season (usually Sept-March): Yes           Oxygen needs?  [] Room air Oxygen @  []1L    [x]2L    []3L   []4L    []5L   []6L     Use home O2? [] Yes [] No  Perform O2 challenge test using  smartphrase (.oxygenchallenge)      Last bowel movement Last Bowel Movement Date: 12/12/18  bowel movement      Urinary Catheter             LDAs               Peripheral IV 12/16/18 (Active)   Site Assessment Clean, dry, & intact 12/18/2018  2:59 AM   Phlebitis Assessment 0 12/18/2018  2:59 AM   Infiltration Assessment 0 12/18/2018  2:59 AM   Dressing Status Clean, dry, & intact 12/18/2018  2:59 AM   Dressing Type Tape;Transparent 12/18/2018  2:59 AM   Hub Color/Line Status Pink;Flushed;Patent 12/18/2018  2:59 AM                         Readmission Risk Assessment Tool Score High Risk            22       Total Score        3 Has Seen PCP in Last 6 Months (Yes=3, No=0)    3 Patient Length of Stay (>5 days = 3)    9 IP Visits Last 12 Months (1-3=4, 4=9, >4=11)    5 Pt. Coverage (Medicare=5 , Medicaid, or Self-Pay=4)    2 Charlson Comorbidity Score (Age + Comorbid Conditions)        Criteria that do not apply:    . Living with Significant Other. Assisted Living. LTAC. SNF.  or   Rehab       Expected Length of Stay 2d 16h   Actual Length of Stay [address]

## 2018-12-18 NOTE — PROGRESS NOTES
Hematology Oncology Progress Note         Follow up for: SC crisis    Chart notes reviewed since last visit. Case discussed with following: nursing staff. Patient complains of the following: States pain in her rt groin worse today but not wanting transfusion. Additional concerns noted by the staff:     Patient Vitals for the past 24 hrs:   BP Temp Pulse Resp SpO2   12/18/18 0908 102/56  87     12/18/18 0812 111/64       12/18/18 0803 96/59       12/18/18 0749 (!) 88/51 98.6 °F (37 °C) 83 18 99 %   12/18/18 0259 110/57 98.9 °F (37.2 °C) 93 18 100 %   12/17/18 2252 111/59 98.4 °F (36.9 °C) 95 18 99 %   12/17/18 2000 110/49 98.7 °F (37.1 °C) 85 18 100 %   12/17/18 1557 115/60 99.5 °F (37.5 °C) 84 18 100 %     ROS negative at present for 11 organ systems except as noted. Physical Examination:  Constitutional Alert, cooperative, oriented. Mood and affect appropriate. Appears close to chronological age. Well nourished. Well developed. Head Normocephalic; no scars   Eyes Conjunctivae and sclerae are clear and without icterus. Pupils are round   ENMT Sinuses are nontender. No oral exudates, ulcers, masses, thrush or mucositis. Oropharynx clear. Tongue normal.   Neck Supple without masses or thyromegaly. No jugular venous distension. Hematologic/Lymphatic No petechiae or purpura. No tender or palpable lymph nodes noted   Respiratory Lungs are clear to auscultation without rhonchi or wheezing. Cardiovascular Regular rate and rhythm of heart without murmurs, gallops or rubs. Chest / Line Site Chest is symmetric with no chest wall deformities. Abdomen Non-tender, non-distended, no masses, ascites or hepatosplenomegaly. Good bowel sounds. Musculoskeletal No tenderness or swelling, normal range of motion without obvious weakness. Extremities No visible deformities, no cyanosis, clubbing or edema. Skin No rashes, scars, or lesions suggestive of malignancy.  No petechiae, purpura, or ecchymoses. No excoriations. Neurologic No sensory or motor deficits noted but not specifically tested. Psychiatric Alert and oriented. Coherent speech. Verbalizes understanding of our discussions today. Labs:  Recent Results (from the past 24 hour(s))   RETICULOCYTE COUNT    Collection Time: 12/18/18  3:17 AM   Result Value Ref Range    Reticulocyte count 9.5 (H) 0.7 - 2.1 %    Absolute Retic Cnt. 0.1826 (H) 0.0164 - 0.0776 M/ul       Assessment and Plan:   SC crisis - she appears to be doing fairly well. The pain appears to be fairly well controlled on her current doses of dilaudid and hydrocodone. She would like to hold on transfusion stating her pain isn't horrible and she doesn't like getting blood. Retic up today a bit but hbg stable at 6.5, will monitor.

## 2018-12-18 NOTE — PROGRESS NOTES
Oncology Nursing Communication Tool  6:43 PM  12/18/2018     \"Bedside\" shift change report given to Jimmy Patel RN (incoming nurse) by Ahmet Perales (outgoing nurse) on [facility]. Report included the following information        Shift Summary: pain management       Issues for physician to address: pain management,BM       Oncology Shift Note   Admission Date 12/12/2018   Admission Diagnosis Sickle cell crisis ([address]   Code Status Full Code   Consults IP CONSULT TO HOSPITALIST  IP CONSULT TO ONCOLOGY      Cardiac Monitoring [] Yes [] No      Purposeful Hourly Rounding [] Yes    Mukund Score Total Score: 1   Mukund score 3 or > [] Bed Alarm [] Avasys [] 1:1 sitter [] Patient refused (Place signed refusal form in chart)      Pain Managed [] Yes [] No    Key Pain Meds             HYDROcodone-acetaminophen (NORCO)  mg tablet Take 1 Tab by mouth every six (6) hours as needed. Max Daily Amount: 4 Tabs. fentaNYL (DURAGESIC) 75 mcg/hr 1 Patch by TransDERmal route every seventy-two (72) hours. Max Daily Amount: 1 Patch. Influenza Vaccine Received Flu Vaccine for Current Season (usually Sept-March): Yes           Oxygen needs?  [] Room air Oxygen @  []1L    []2L    []3L   []4L    []5L   []6L     Use home O2? [] Yes [] No  Perform O2 challenge test using  smartphrase (.oxygenchallenge)      Last bowel movement Last Bowel Movement Date: 12/12/18  bowel movement      Urinary Catheter             LDAs               Peripheral IV 12/16/18 (Active)   Site Assessment Clean, dry, & intact 12/18/2018  3:33 PM   Phlebitis Assessment 0 12/18/2018  3:33 PM   Infiltration Assessment 0 12/18/2018  3:33 PM   Dressing Status Clean, dry, & intact 12/18/2018  3:33 PM   Dressing Type Tape;Transparent 12/18/2018  3:33 PM   Hub Color/Line Status Infusing 12/18/2018  3:33 PM                         Readmission Risk Assessment Tool Score High Risk            22       Total Score        3 Has Seen PCP in Last 6 Months (Yes=3, No=0)    3 Patient Length of Stay (>5 days = 3)    9 IP Visits Last 12 Months (1-3=4, 4=9, >4=11)    5 Pt. Coverage (Medicare=5 , Medicaid, or Self-Pay=4)    2 Charlson Comorbidity Score (Age + Comorbid Conditions)        Criteria that do not apply:    . Living with Significant Other. Assisted Living. LTAC. SNF.  or   Rehab       Expected Length of Stay 2d 16h   Actual Length of Stay [address]

## 2018-12-18 NOTE — PROGRESS NOTES
Problem: Falls - Risk of  Goal: *Absence of Falls  Document Mukund Fall Risk and appropriate interventions in the flowsheet.   Outcome: Progressing Towards Goal  Fall Risk Interventions:            Medication Interventions: Evaluate medications/consider consulting pharmacy

## 2018-12-18 NOTE — PROGRESS NOTES
Initial Nutrition Assessment:    INTERVENTIONS/RECOMMENDATIONS:   · Continue current diet    ASSESSMENT:   Chart reviewed, medically noted for sickle cell crisis and PMH shown below. Assessing pt due to LOS. Pt reports a good appetite and eating well. 100% of lunch was consumed. Pt reports poor PO intake 3 days PTA due to pain. Weight history shows weight has fluctuated quite a bit over the past several months, scale inaccuracy? Past Medical History:   Diagnosis Date    Anemia     SC hemoglobinopathy    Chronic pain     Depression     Hypertension     Liver disease     hepatitis C; pt reports \"cured\"    Sickle cell disease ([facility] [address]        Diet Order: Regular  % Eaten:  No data found.   Pertinent Medications: [x]Reviewed: D5 1/2 NS, colace, folic acid, zofran, PPI, miralax,   Pertinent Labs: [x]Reviewed:   Food Allergies: [x]NKFA  []Other   Last BM: 12/12  Edema:        []RUE   []LUE   []RLE   []LLE      Pressure Injury:      [] Stage I   [] Stage II   [] Stage III   [] Stage IV      Wt Readings from Last 30 Encounters:   12/12/18 77 kg (169 lb 12.1 oz)   11/13/18 89.4 kg (197 lb 1.6 oz)   10/29/18 77.4 kg (170 lb 10.2 oz)   10/02/18 91.5 kg (201 lb 12.8 oz)   09/24/18 86.3 kg (190 lb 3.2 oz)   09/07/18 83.9 kg (185 lb)   07/22/18 85.7 kg (188 lb 15 oz)   07/09/18 86.6 kg (191 lb)   06/12/18 88.7 kg (195 lb 8 oz)   04/09/18 86 kg (189 lb 8 oz)   03/13/18 84.9 kg (187 lb 3.2 oz)   01/29/18 87.2 kg (192 lb 4.8 oz)   01/05/18 86.4 kg (190 lb 7.6 oz)   01/04/18 86 kg (189 lb 9.5 oz)   11/28/17 87.7 kg (193 lb 4.8 oz)   11/14/17 89.9 kg (198 lb 4.8 oz)   10/31/17 87.7 kg (193 lb 6.4 oz)   07/31/17 88 kg (194 lb)   06/13/17 87.6 kg (193 lb 1.6 oz)   05/22/17 86.1 kg (189 lb 13.1 oz)   04/27/17 90.6 kg (199 lb 11.2 oz)   04/06/17 91.9 kg (202 lb 9.6 oz)   03/10/17 89.9 kg (198 lb 3.2 oz)   12/09/16 92.1 kg (203 lb 1.6 oz)   11/15/16 94.6 kg (208 lb 9.6 oz)   10/21/16 89.4 kg (197 lb)   07/21/16 92.1 kg (203 lb) 05/17/16 91.2 kg (201 lb)   04/21/16 92.1 kg (203 lb)   01/19/16 92.1 kg (203 lb)       Anthropometrics:   Height: 5' 11\" (180.3 cm) Weight: 77 kg (169 lb 12.1 oz)   IBW (%IBW):   ( ) UBW (%UBW):   (  %)   Last Weight Metrics:  Weight Loss Metrics 12/12/2018 11/13/2018 10/29/2018 10/2/2018 9/24/2018 9/8/2018 9/7/2018   Today's Wt 169 lb 12.1 oz 197 lb 1.6 oz 170 lb 10.2 oz 201 lb 12.8 oz 190 lb 3.2 oz - 185 lb   BMI 23.68 kg/m2 27.49 kg/m2 23.8 kg/m2 28.15 kg/m2 26.53 kg/m2 25.8 kg/m2 -       BMI: Body mass index is 23.68 kg/m². This BMI is indicative of:   []Underweight    [x]Normal    []Overweight    [] Obesity   [] Extreme Obesity (BMI>40)     Estimated Nutrition Needs (Based on):   1825 Kcals/day(BMR: 1400 x 1.3) , 60 g(0.8 g/kg) Protein  Carbohydrate:  At Least 130 g/day  Fluids: 1825 mL/day (1ml/kcal) or per primary team    NUTRITION DIAGNOSES:   Problem:  No nutritional diagnosis at this time      Etiology: related to       Signs/Symptoms: as evidenced by        NUTRITION INTERVENTIONS:  Meals/Snacks: General/healthful diet                  GOAL:   consume >75% of meals in 5-7 days    LEARNING NEEDS (Diet, Food/Nutrient-Drug Interaction):    [x] None Identified   [] Identified and Education Provided/Documented   [] Identified and Pt declined/was not appropriate     Cultureal, Judaism, OR Ethnic Dietary Needs:    [x] None Identified   [] Identified and Addressed     [x] Interdisciplinary Care Plan Reviewed/Documented    [x] Discharge Planning:   General healthy diet    MONITORING /EVALUATION:   Behavioral-Environmental Outcomes: Readiness to change     Physical Signs/Symptoms Outcomes: Weight/weight change, Electrolyte and renal profile, GI    NUTRITION RISK:    [] High              [] Moderate           []  Low  [x]  Minimal/Uncompromised    PT SEEN FOR:    []  MD Consult: []Calorie Count      []Diabetic Diet Education        []Diet Education     []Electrolyte Management     []General Nutrition Management and Supplements     []Management of Tube Feeding     []TPN Recommendations    []  RN Referral:  []MST score >=2     []Enteral/Parenteral Nutrition PTA     []Pregnant: Gestational DM or Multigestation     []Pressure Ulcer/Wound Care needs        []  Low BMI  [x]  LOS Referral       Jenny Harris RDN  Pager [phone]  Weekend Pager [phone]

## 2018-12-19 LAB
ANION GAP SERPL CALC-SCNC: 6 MMOL/L (ref 5–15)
BASOPHILS # BLD: 0 K/UL (ref 0–0.1)
BASOPHILS NFR BLD: 0 % (ref 0–1)
BUN SERPL-MCNC: 4 MG/DL (ref 6–20)
BUN/CREAT SERPL: 5 (ref 12–20)
CALCIUM SERPL-MCNC: 8.5 MG/DL (ref 8.5–10.1)
CHLORIDE SERPL-SCNC: 106 MMOL/L (ref 97–108)
CO2 SERPL-SCNC: 26 MMOL/L (ref 21–32)
CREAT SERPL-MCNC: 0.74 MG/DL (ref 0.55–1.02)
DIFFERENTIAL METHOD BLD: ABNORMAL
EOSINOPHIL # BLD: 0.5 K/UL (ref 0–0.4)
EOSINOPHIL NFR BLD: 6 % (ref 0–7)
ERYTHROCYTE [DISTWIDTH] IN BLOOD BY AUTOMATED COUNT: 17.7 % (ref 11.5–14.5)
GLUCOSE SERPL-MCNC: 95 MG/DL (ref 65–100)
HCT VFR BLD AUTO: 20 % (ref 35–47)
HGB BLD-MCNC: 6.6 G/DL (ref 11.5–16)
IMM GRANULOCYTES # BLD: 0 K/UL (ref 0–0.04)
IMM GRANULOCYTES NFR BLD AUTO: 0 % (ref 0–0.5)
LYMPHOCYTES # BLD: 3.7 K/UL (ref 0.8–3.5)
LYMPHOCYTES NFR BLD: 43 % (ref 12–49)
MCH RBC QN AUTO: 31 PG (ref 26–34)
MCHC RBC AUTO-ENTMCNC: 33 G/DL (ref 30–36.5)
MCV RBC AUTO: 93.9 FL (ref 80–99)
MONOCYTES # BLD: 1.2 K/UL (ref 0–1)
MONOCYTES NFR BLD: 14 % (ref 5–13)
NEUTS SEG # BLD: 3.1 K/UL (ref 1.8–8)
NEUTS SEG NFR BLD: 37 % (ref 32–75)
NRBC # BLD: 0.09 K/UL (ref 0–0.01)
NRBC BLD-RTO: 1.1 PER 100 WBC
PLATELET # BLD AUTO: 197 K/UL (ref 150–400)
PMV BLD AUTO: 11.5 FL (ref 8.9–12.9)
POTASSIUM SERPL-SCNC: 4.3 MMOL/L (ref 3.5–5.1)
RBC # BLD AUTO: 2.13 M/UL (ref 3.8–5.2)
RBC MORPH BLD: ABNORMAL
SODIUM SERPL-SCNC: 138 MMOL/L (ref 136–145)
WBC # BLD AUTO: 8.5 K/UL (ref 3.6–11)

## 2018-12-19 PROCEDURE — 74011250636 HC RX REV CODE- 250/636: Performed by: INTERNAL MEDICINE

## 2018-12-19 PROCEDURE — 65660000000 HC RM CCU STEPDOWN

## 2018-12-19 PROCEDURE — 85025 COMPLETE CBC W/AUTO DIFF WBC: CPT

## 2018-12-19 PROCEDURE — 36415 COLL VENOUS BLD VENIPUNCTURE: CPT

## 2018-12-19 PROCEDURE — 80048 BASIC METABOLIC PNL TOTAL CA: CPT

## 2018-12-19 PROCEDURE — 74011000250 HC RX REV CODE- 250: Performed by: INTERNAL MEDICINE

## 2018-12-19 PROCEDURE — 77010033678 HC OXYGEN DAILY

## 2018-12-19 PROCEDURE — 94760 N-INVAS EAR/PLS OXIMETRY 1: CPT

## 2018-12-19 PROCEDURE — 74011250637 HC RX REV CODE- 250/637: Performed by: INTERNAL MEDICINE

## 2018-12-19 PROCEDURE — 74011250636 HC RX REV CODE- 250/636: Performed by: EMERGENCY MEDICINE

## 2018-12-19 RX ADMIN — PANTOPRAZOLE SODIUM 40 MG: 40 TABLET, DELAYED RELEASE ORAL at 09:39

## 2018-12-19 RX ADMIN — HYDROMORPHONE HYDROCHLORIDE 1 MG: 1 INJECTION, SOLUTION INTRAMUSCULAR; INTRAVENOUS; SUBCUTANEOUS at 12:35

## 2018-12-19 RX ADMIN — ONDANSETRON 4 MG: 2 INJECTION INTRAMUSCULAR; INTRAVENOUS at 23:43

## 2018-12-19 RX ADMIN — DIPHENHYDRAMINE HYDROCHLORIDE 25 MG: 50 INJECTION, SOLUTION INTRAMUSCULAR; INTRAVENOUS at 18:40

## 2018-12-19 RX ADMIN — Medication 10 ML: at 00:13

## 2018-12-19 RX ADMIN — HYDROMORPHONE HYDROCHLORIDE 1 MG: 1 INJECTION, SOLUTION INTRAMUSCULAR; INTRAVENOUS; SUBCUTANEOUS at 18:40

## 2018-12-19 RX ADMIN — HYDROMORPHONE HYDROCHLORIDE 1 MG: 1 INJECTION, SOLUTION INTRAMUSCULAR; INTRAVENOUS; SUBCUTANEOUS at 06:37

## 2018-12-19 RX ADMIN — ENOXAPARIN SODIUM 40 MG: 40 INJECTION SUBCUTANEOUS at 09:39

## 2018-12-19 RX ADMIN — CYCLOBENZAPRINE HYDROCHLORIDE 10 MG: 10 TABLET, FILM COATED ORAL at 05:39

## 2018-12-19 RX ADMIN — FOLIC ACID 1 MG: 1 TABLET ORAL at 09:39

## 2018-12-19 RX ADMIN — HYDROMORPHONE HYDROCHLORIDE 1 MG: 1 INJECTION, SOLUTION INTRAMUSCULAR; INTRAVENOUS; SUBCUTANEOUS at 09:39

## 2018-12-19 RX ADMIN — Medication 10 ML: at 06:38

## 2018-12-19 RX ADMIN — HYDROMORPHONE HYDROCHLORIDE 1 MG: 1 INJECTION, SOLUTION INTRAMUSCULAR; INTRAVENOUS; SUBCUTANEOUS at 23:43

## 2018-12-19 RX ADMIN — DIPHENHYDRAMINE HYDROCHLORIDE 25 MG: 50 INJECTION, SOLUTION INTRAMUSCULAR; INTRAVENOUS at 06:37

## 2018-12-19 RX ADMIN — ONDANSETRON 4 MG: 2 INJECTION INTRAMUSCULAR; INTRAVENOUS at 00:12

## 2018-12-19 RX ADMIN — DIPHENHYDRAMINE HYDROCHLORIDE 25 MG: 50 INJECTION, SOLUTION INTRAMUSCULAR; INTRAVENOUS at 23:43

## 2018-12-19 RX ADMIN — HYDROCODONE BITARTRATE AND ACETAMINOPHEN 1 TABLET: 10; 325 TABLET ORAL at 17:39

## 2018-12-19 RX ADMIN — CITALOPRAM HYDROBROMIDE 10 MG: 20 TABLET ORAL at 17:39

## 2018-12-19 RX ADMIN — DEXTROSE MONOHYDRATE, SODIUM CHLORIDE, AND POTASSIUM CHLORIDE 100 ML/HR: 50; 4.5; 1.49 INJECTION, SOLUTION INTRAVENOUS at 09:38

## 2018-12-19 RX ADMIN — HYDROMORPHONE HYDROCHLORIDE 1 MG: 1 INJECTION, SOLUTION INTRAMUSCULAR; INTRAVENOUS; SUBCUTANEOUS at 00:12

## 2018-12-19 RX ADMIN — HYDROCODONE BITARTRATE AND ACETAMINOPHEN 1 TABLET: 10; 325 TABLET ORAL at 04:27

## 2018-12-19 RX ADMIN — HYDROMORPHONE HYDROCHLORIDE 1 MG: 1 INJECTION, SOLUTION INTRAMUSCULAR; INTRAVENOUS; SUBCUTANEOUS at 03:31

## 2018-12-19 RX ADMIN — Medication 10 ML: at 15:34

## 2018-12-19 RX ADMIN — Medication 10 ML: at 22:28

## 2018-12-19 RX ADMIN — ONDANSETRON 4 MG: 2 INJECTION INTRAMUSCULAR; INTRAVENOUS at 06:37

## 2018-12-19 RX ADMIN — HYDROCODONE BITARTRATE AND ACETAMINOPHEN 1 TABLET: 10; 325 TABLET ORAL at 11:36

## 2018-12-19 RX ADMIN — CYCLOBENZAPRINE HYDROCHLORIDE 10 MG: 10 TABLET, FILM COATED ORAL at 22:27

## 2018-12-19 RX ADMIN — HYDROMORPHONE HYDROCHLORIDE 1 MG: 1 INJECTION, SOLUTION INTRAMUSCULAR; INTRAVENOUS; SUBCUTANEOUS at 15:34

## 2018-12-19 RX ADMIN — DEXTROSE MONOHYDRATE, SODIUM CHLORIDE, AND POTASSIUM CHLORIDE 100 ML/HR: 50; 4.5; 1.49 INJECTION, SOLUTION INTRAVENOUS at 20:12

## 2018-12-19 RX ADMIN — POLYETHYLENE GLYCOL 3350 17 G: 17 POWDER, FOR SOLUTION ORAL at 10:05

## 2018-12-19 RX ADMIN — ONDANSETRON 4 MG: 2 INJECTION INTRAMUSCULAR; INTRAVENOUS at 12:35

## 2018-12-19 RX ADMIN — DIPHENHYDRAMINE HYDROCHLORIDE 25 MG: 50 INJECTION, SOLUTION INTRAMUSCULAR; INTRAVENOUS at 00:12

## 2018-12-19 RX ADMIN — DIPHENHYDRAMINE HYDROCHLORIDE 25 MG: 50 INJECTION, SOLUTION INTRAMUSCULAR; INTRAVENOUS at 12:35

## 2018-12-19 RX ADMIN — HYDROMORPHONE HYDROCHLORIDE 1 MG: 1 INJECTION, SOLUTION INTRAMUSCULAR; INTRAVENOUS; SUBCUTANEOUS at 21:28

## 2018-12-19 RX ADMIN — ONDANSETRON 4 MG: 2 INJECTION INTRAMUSCULAR; INTRAVENOUS at 18:40

## 2018-12-19 NOTE — PROGRESS NOTES
General Daily Progress Note    Admit Date: 12/12/2018    Subjective:     Patient continues to complain of pain back and pelvic area. Current Facility-Administered Medications   Medication Dose Route Frequency    dextrose 5% - 0.45% NaCl with KCl 20 mEq/L infusion  100 mL/hr IntraVENous CONTINUOUS    diphenhydrAMINE (BENADRYL) injection 25 mg  25 mg IntraVENous Q4H PRN    HYDROmorphone (PF) (DILAUDID) injection 1 mg  1 mg IntraVENous Q3H PRN    citalopram (CELEXA) tablet 10 mg  10 mg Oral QPM    cyclobenzaprine (FLEXERIL) tablet 10 mg  10 mg Oral TID PRN    fentaNYL (DURAGESIC) 75 mcg/hr patch 1 Patch  1 Patch TransDERmal G78B    folic acid (FOLVITE) tablet 1 mg  1 mg Oral DAILY    HYDROcodone-acetaminophen (NORCO)  mg tablet 1 Tab  1 Tab Oral Q6H PRN    pantoprazole (PROTONIX) tablet 40 mg  40 mg Oral DAILY    sodium chloride (NS) flush 5-10 mL  5-10 mL IntraVENous Q8H    sodium chloride (NS) flush 5-10 mL  5-10 mL IntraVENous PRN    acetaminophen (TYLENOL) tablet 650 mg  650 mg Oral Q6H PRN    ondansetron (ZOFRAN) injection 4 mg  4 mg IntraVENous Q6H PRN    docusate sodium (COLACE) capsule 100 mg  100 mg Oral DAILY PRN    enoxaparin (LOVENOX) injection 40 mg  40 mg SubCUTAneous Q24H    polyethylene glycol (MIRALAX) packet 17 g  17 g Oral BID PRN        Review of Systems  A comprehensive review of systems was negative. Objective:     Patient Vitals for the past 24 hrs:   BP Temp Pulse Resp SpO2   12/19/18 0749 115/67 97.7 °F (36.5 °C) 93 16 98 %   12/19/18 0328 103/68 98.7 °F (37.1 °C) 85 15 100 %   12/19/18 0001 111/59       12/18/18 2229 94/63 98.7 °F (37.1 °C) 88 14 96 %   12/18/18 1856 103/60 98.3 °F (36.8 °C) 86 16 100 %   12/18/18 1520 118/61 99.4 °F (37.4 °C) 88 16 100 %   12/18/18 1206 110/63 99.1 °F (37.3 °C) 81 18 98 %   12/18/18 0908 102/56  87       No intake/output data recorded. No intake/output data recorded.     Physical Exam:   Visit Vitals  /67 (BP 1 Location: Right arm, BP Patient Position: At rest)   Pulse 93   Temp 97.7 °F (36.5 °C)   Resp 16   Ht 5' 11\" (1.803 m)   Wt 169 lb 12.1 oz (77 kg)   SpO2 98%   Breastfeeding? No   BMI 23.68 kg/m²     General appearance: alert, cooperative, no distress, appears stated age  Neck: supple, symmetrical, trachea midline, no adenopathy, thyroid: not enlarged, symmetric, no tenderness/mass/nodules, no carotid bruit and no JVD  Lungs: clear to auscultation bilaterally  Heart: regular rate and rhythm, S1, S2 normal, no murmur, click, rub or gallop  Abdomen: soft, non-tender. Bowel sounds normal. No masses,  no organomegaly  Extremities: extremities normal, atraumatic, no cyanosis or edema        Data Review   Recent Results (from the past 24 hour(s))   CBC WITH AUTOMATED DIFF    Collection Time: 12/19/18  4:29 AM   Result Value Ref Range    WBC 8.5 3.6 - 11.0 K/uL    RBC 2.13 (L) 3.80 - 5.20 M/uL    HGB 6.6 (L) 11.5 - 16.0 g/dL    HCT 20.0 (L) 35.0 - 47.0 %    MCV 93.9 80.0 - 99.0 FL    MCH 31.0 26.0 - 34.0 PG    MCHC 33.0 30.0 - 36.5 g/dL    RDW 17.7 (H) 11.5 - 14.5 %    PLATELET 996 788 - 711 K/uL    MPV 11.5 8.9 - 12.9 FL    NRBC 1.1 (H) 0  WBC    ABSOLUTE NRBC 0.09 (H) 0.00 - 0.01 K/uL    NEUTROPHILS 37 32 - 75 %    LYMPHOCYTES 43 12 - 49 %    MONOCYTES 14 (H) 5 - 13 %    EOSINOPHILS 6 0 - 7 %    BASOPHILS 0 0 - 1 %    IMMATURE GRANULOCYTES 0 0.0 - 0.5 %    ABS. NEUTROPHILS 3.1 1.8 - 8.0 K/UL    ABS. LYMPHOCYTES 3.7 (H) 0.8 - 3.5 K/UL    ABS. MONOCYTES 1.2 (H) 0.0 - 1.0 K/UL    ABS. EOSINOPHILS 0.5 (H) 0.0 - 0.4 K/UL    ABS. BASOPHILS 0.0 0.0 - 0.1 K/UL    ABS. IMM.  GRANS. 0.0 0.00 - 0.04 K/UL    DF AUTOMATED      RBC COMMENTS ANISOCYTOSIS  1+        RBC COMMENTS SICKLE CELLS  PRESENT        RBC COMMENTS TARGET CELLS  PRESENT        RBC COMMENTS HYPOCHROMIA  1+        RBC COMMENTS OVALOCYTES  PRESENT       METABOLIC PANEL, BASIC    Collection Time: 12/19/18  4:29 AM   Result Value Ref Range    Sodium 138 136 - 145 mmol/L    Potassium 4.3 3.5 - 5.1 mmol/L    Chloride 106 97 - 108 mmol/L    CO2 26 21 - 32 mmol/L    Anion gap 6 5 - 15 mmol/L    Glucose 95 65 - 100 mg/dL    BUN 4 (L) 6 - 20 MG/DL    Creatinine 0.74 0.55 - 1.02 MG/DL    BUN/Creatinine ratio 5 (L) 12 - 20      GFR est AA >60 >60 ml/min/1.73m2    GFR est non-AA >60 >60 ml/min/1.73m2    Calcium 8.5 8.5 - 10.1 MG/DL           Assessment:     Principal Problem:    Sickle cell crisis ([facility] [address] (5/22/2017)    Active Problems:    Hypertension (10/3/2014)      Depression (10/5/2014)        Plan:     1. Continue treatment of painful crises. 2.  We will mobilize. 3.  Hydration adequate.

## 2018-12-19 NOTE — PROGRESS NOTES
Oncology Nursing Communication Tool  11:58 AM  12/19/2018     Bedside shift change report given to Connie Hooker RN (incoming nurse) by Letha Kidd (outgoing nurse) on [facility]. Report included the following information SBAR. Shift Summary: Pt requested PRN pain medication multiple times today for pain rating of 7 or 8 out of 10 on pain scale. No other complaints at this time. Issues for physician to address: Oncology Shift Note   Admission Date 12/12/2018   Admission Diagnosis Sickle cell crisis ([facility] [address]   Code Status Full Code   Consults IP CONSULT TO HOSPITALIST  IP CONSULT TO ONCOLOGY      Cardiac Monitoring [] Yes [] No      Purposeful Hourly Rounding [] Yes    Mukund Score Total Score: 1   Mukund score 3 or > [] Bed Alarm [] Avasys [] 1:1 sitter [] Patient refused (Place signed refusal form in chart)      Pain Managed [] Yes [] No    Key Pain Meds             HYDROcodone-acetaminophen (NORCO)  mg tablet Take 1 Tab by mouth every six (6) hours as needed. Max Daily Amount: 4 Tabs. fentaNYL (DURAGESIC) 75 mcg/hr 1 Patch by TransDERmal route every seventy-two (72) hours. Max Daily Amount: 1 Patch. Influenza Vaccine Received Flu Vaccine for Current Season (usually Sept-March): Yes           Oxygen needs?  [] Room air Oxygen @  []1L    []2L    []3L   []4L    []5L   []6L     Use home O2? [] Yes [] No  Perform O2 challenge test using  smartphrase (.oxygenchallenge)      Last bowel movement Last Bowel Movement Date: 12/12/18  bowel movement      Urinary Catheter             LDAs               Peripheral IV 12/16/18 (Active)   Site Assessment Clean, dry, & intact 12/19/2018  8:15 AM   Phlebitis Assessment 0 12/19/2018  8:15 AM   Infiltration Assessment 0 12/19/2018  8:15 AM   Dressing Status Clean, dry, & intact 12/19/2018  8:15 AM   Dressing Type Transparent;Tape 12/19/2018  8:15 AM   Hub Color/Line Status Infusing 12/19/2018  8:15 AM                         Readmission Risk Assessment Tool Score High Risk            22       Total Score        3 Has Seen PCP in Last 6 Months (Yes=3, No=0)    3 Patient Length of Stay (>5 days = 3)    9 IP Visits Last 12 Months (1-3=4, 4=9, >4=11)    5 Pt. Coverage (Medicare=5 , Medicaid, or Self-Pay=4)    2 Charlson Comorbidity Score (Age + Comorbid Conditions)        Criteria that do not apply:    . Living with Significant Other. Assisted Living. LTAC. SNF.  or   Rehab       Expected Length of Stay 2d 16h   Actual Length of Stay [address]

## 2018-12-19 NOTE — PROGRESS NOTES
12/18/18 2229   Vital Signs   Temp 98.7 °F (37.1 °C)   Temp Source Oral   Pulse (Heart Rate) 88   Heart Rate Source Monitor   Cardiac Rhythm NSR   Resp Rate 14   O2 Sat (%) 96 %   Level of Consciousness Alert   BP 94/63   MAP (Calculated) 73   BP 1 Method Automatic   BP 1 Location Right arm   BP Patient Position At rest   MEWS Score 1   Oxygen Therapy   O2 Device Nasal cannula   O2 Flow Rate (L/min) 2 l/min   Encouraging oral fluids, will continue to monitor

## 2018-12-19 NOTE — PROGRESS NOTES
Received report from [address] [facility]  Pt had a few low BP's during the day  Pain management and fluids       Oncology Nursing Communication Tool  8:47 AM  12/20/2018     Bedside shift change report given to Chantal Shine RN (incoming nurse) by Onofre Knox (outgoing nurse) on [facility]. Report included the following information SBAR, Kardex, Intake/Output and MAR. Shift Summary: IV became infiltrated and was removed, new IV placed after several tries but was infiltrated within 30 minutes, will need PICC team for IV, pain management and IV fluids remains imperative      Issues for physician to address: Oncology Shift Note   Admission Date 12/12/2018   Admission Diagnosis Sickle cell crisis ([facility] [address]   Code Status Full Code   Consults IP CONSULT TO HOSPITALIST  IP CONSULT TO ONCOLOGY      Cardiac Monitoring [] Yes [] No      Purposeful Hourly Rounding [] Yes    Mukund Score Total Score: 1   Mukund score 3 or > [] Bed Alarm [] Avasys [] 1:1 sitter [] Patient refused (Place signed refusal form in chart)      Pain Managed [] Yes [] No    Key Pain Meds             HYDROcodone-acetaminophen (NORCO)  mg tablet Take 1 Tab by mouth every six (6) hours as needed. Max Daily Amount: 4 Tabs. fentaNYL (DURAGESIC) 75 mcg/hr 1 Patch by TransDERmal route every seventy-two (72) hours. Max Daily Amount: 1 Patch. Influenza Vaccine Received Flu Vaccine for Current Season (usually Sept-March): Yes           Oxygen needs?  [] Room air Oxygen @  []1L    []2L    []3L   []4L    []5L   []6L     Use home O2? [] Yes [] No  Perform O2 challenge test using  smartphrase (.oxygenchallenge)      Last bowel movement Last Bowel Movement Date: 12/12/18  bowel movement      Urinary Catheter             LDAs               Peripheral IV 12/16/18 (Active)   Site Assessment Clean, dry, & intact 12/20/2018  3:23 AM   Phlebitis Assessment 0 12/20/2018  3:23 AM   Infiltration Assessment 0 12/20/2018  3:23 AM   Dressing Status Clean, dry, & intact 12/20/2018  3:23 AM   Dressing Type Tape;Transparent 12/20/2018  3:23 AM   Hub Color/Line Status Infusing 12/20/2018  3:23 AM                         Readmission Risk Assessment Tool Score High Risk            22       Total Score        3 Has Seen PCP in Last 6 Months (Yes=3, No=0)    3 Patient Length of Stay (>5 days = 3)    9 IP Visits Last 12 Months (1-3=4, 4=9, >4=11)    5 Pt. Coverage (Medicare=5 , Medicaid, or Self-Pay=4)    2 Charlson Comorbidity Score (Age + Comorbid Conditions)        Criteria that do not apply:    . Living with Significant Other. Assisted Living. LTAC. SNF.  or   Rehab       Expected Length of Stay 2d 16h   Actual Length of Stay [address] [facility]

## 2018-12-19 NOTE — PROGRESS NOTES
Hematology Oncology Progress Note         Follow up for: SC crisis    Chart notes reviewed since last visit. Case discussed with following: nursing staff. Patient complains of the following: Still having pain, denies chills or chest pains. Additional concerns noted by the staff:     Patient Vitals for the past 24 hrs:   BP Temp Pulse Resp SpO2   12/19/18 0749 115/67 97.7 °F (36.5 °C) 93 16 98 %   12/19/18 0328 103/68 98.7 °F (37.1 °C) 85 15 100 %   12/19/18 0001 111/59       12/18/18 2229 94/63 98.7 °F (37.1 °C) 88 14 96 %   12/18/18 1856 103/60 98.3 °F (36.8 °C) 86 16 100 %   12/18/18 1520 118/61 99.4 °F (37.4 °C) 88 16 100 %   12/18/18 1206 110/63 99.1 °F (37.3 °C) 81 18 98 %     ROS negative at present for 11 organ systems except as noted. Physical Examination:  Constitutional Alert, cooperative, oriented. Mood and affect appropriate. Appears close to chronological age. Well nourished. Well developed. Head Normocephalic; no scars   Eyes Conjunctivae and sclerae are clear and without icterus. Pupils are round   ENMT Sinuses are nontender. No oral exudates, ulcers, masses, thrush or mucositis. Oropharynx clear. Tongue normal.   Neck Supple without masses or thyromegaly. No jugular venous distension. Hematologic/Lymphatic No petechiae or purpura. No tender or palpable lymph nodes noted   Respiratory Lungs are clear to auscultation without rhonchi or wheezing. Cardiovascular Regular rate and rhythm of heart without murmurs, gallops or rubs. Chest / Line Site Chest is symmetric with no chest wall deformities. Abdomen Non-tender, non-distended, no masses, ascites or hepatosplenomegaly. Good bowel sounds. Musculoskeletal No tenderness or swelling, normal range of motion without obvious weakness. Extremities No visible deformities, no cyanosis, clubbing or edema. Skin No rashes, scars, or lesions suggestive of malignancy. No petechiae, purpura, or ecchymoses. No excoriations. Neurologic No sensory or motor deficits noted but not specifically tested. Psychiatric Alert and oriented. Coherent speech. Verbalizes understanding of our discussions today. Labs:  Recent Results (from the past 24 hour(s))   CBC WITH AUTOMATED DIFF    Collection Time: 12/19/18  4:29 AM   Result Value Ref Range    WBC 8.5 3.6 - 11.0 K/uL    RBC 2.13 (L) 3.80 - 5.20 M/uL    HGB 6.6 (L) 11.5 - 16.0 g/dL    HCT 20.0 (L) 35.0 - 47.0 %    MCV 93.9 80.0 - 99.0 FL    MCH 31.0 26.0 - 34.0 PG    MCHC 33.0 30.0 - 36.5 g/dL    RDW 17.7 (H) 11.5 - 14.5 %    PLATELET 288 299 - 525 K/uL    MPV 11.5 8.9 - 12.9 FL    NRBC 1.1 (H) 0  WBC    ABSOLUTE NRBC 0.09 (H) 0.00 - 0.01 K/uL    NEUTROPHILS 37 32 - 75 %    LYMPHOCYTES 43 12 - 49 %    MONOCYTES 14 (H) 5 - 13 %    EOSINOPHILS 6 0 - 7 %    BASOPHILS 0 0 - 1 %    IMMATURE GRANULOCYTES 0 0.0 - 0.5 %    ABS. NEUTROPHILS 3.1 1.8 - 8.0 K/UL    ABS. LYMPHOCYTES 3.7 (H) 0.8 - 3.5 K/UL    ABS. MONOCYTES 1.2 (H) 0.0 - 1.0 K/UL    ABS. EOSINOPHILS 0.5 (H) 0.0 - 0.4 K/UL    ABS. BASOPHILS 0.0 0.0 - 0.1 K/UL    ABS. IMM. GRANS. 0.0 0.00 - 0.04 K/UL    DF AUTOMATED      RBC COMMENTS ANISOCYTOSIS  1+        RBC COMMENTS SICKLE CELLS  PRESENT        RBC COMMENTS TARGET CELLS  PRESENT        RBC COMMENTS HYPOCHROMIA  1+        RBC COMMENTS OVALOCYTES  PRESENT       METABOLIC PANEL, BASIC    Collection Time: 12/19/18  4:29 AM   Result Value Ref Range    Sodium 138 136 - 145 mmol/L    Potassium 4.3 3.5 - 5.1 mmol/L    Chloride 106 97 - 108 mmol/L    CO2 26 21 - 32 mmol/L    Anion gap 6 5 - 15 mmol/L    Glucose 95 65 - 100 mg/dL    BUN 4 (L) 6 - 20 MG/DL    Creatinine 0.74 0.55 - 1.02 MG/DL    BUN/Creatinine ratio 5 (L) 12 - 20      GFR est AA >60 >60 ml/min/1.73m2    GFR est non-AA >60 >60 ml/min/1.73m2    Calcium 8.5 8.5 - 10.1 MG/DL       Assessment and Plan:   SC crisis - she appears to be doing fairly well.  The pain appears to be fairly well controlled on her current doses of dilaudid and hydrocodone. She would like to hold on transfusion stating her pain isn't horrible and she doesn't like getting blood. Hbg 6.6 today.

## 2018-12-19 NOTE — PROGRESS NOTES
CM reviewed medical record and met with pt during IDR's. Pain control is still an issue. CM will follow for discharge needs.     ABBI Thomas, RN  Care Manager [facility]  [phone]

## 2018-12-20 PROCEDURE — 74011250636 HC RX REV CODE- 250/636: Performed by: INTERNAL MEDICINE

## 2018-12-20 PROCEDURE — 76937 US GUIDE VASCULAR ACCESS: CPT

## 2018-12-20 PROCEDURE — 74011250636 HC RX REV CODE- 250/636: Performed by: EMERGENCY MEDICINE

## 2018-12-20 PROCEDURE — 77030018786 HC NDL GD F/USND BARD -B

## 2018-12-20 PROCEDURE — 77030018719 HC DRSG PTCH ANTIMIC J&J -A

## 2018-12-20 PROCEDURE — C1751 CATH, INF, PER/CENT/MIDLINE: HCPCS

## 2018-12-20 PROCEDURE — 74011250637 HC RX REV CODE- 250/637: Performed by: INTERNAL MEDICINE

## 2018-12-20 PROCEDURE — 74011000250 HC RX REV CODE- 250: Performed by: INTERNAL MEDICINE

## 2018-12-20 PROCEDURE — 65660000000 HC RM CCU STEPDOWN

## 2018-12-20 RX ORDER — SODIUM CHLORIDE 0.9 % (FLUSH) 0.9 %
10 SYRINGE (ML) INJECTION EVERY 8 HOURS
Status: DISCONTINUED | OUTPATIENT
Start: 2018-12-20 | End: 2019-01-03 | Stop reason: HOSPADM

## 2018-12-20 RX ORDER — SODIUM CHLORIDE 0.9 % (FLUSH) 0.9 %
10 SYRINGE (ML) INJECTION AS NEEDED
Status: DISCONTINUED | OUTPATIENT
Start: 2018-12-20 | End: 2019-01-03 | Stop reason: HOSPADM

## 2018-12-20 RX ADMIN — HYDROMORPHONE HYDROCHLORIDE 1 MG: 1 INJECTION, SOLUTION INTRAMUSCULAR; INTRAVENOUS; SUBCUTANEOUS at 06:55

## 2018-12-20 RX ADMIN — HYDROMORPHONE HYDROCHLORIDE 1 MG: 1 INJECTION, SOLUTION INTRAMUSCULAR; INTRAVENOUS; SUBCUTANEOUS at 15:26

## 2018-12-20 RX ADMIN — ONDANSETRON 4 MG: 2 INJECTION INTRAMUSCULAR; INTRAVENOUS at 12:37

## 2018-12-20 RX ADMIN — Medication 10 ML: at 21:56

## 2018-12-20 RX ADMIN — ONDANSETRON 4 MG: 2 INJECTION INTRAMUSCULAR; INTRAVENOUS at 19:54

## 2018-12-20 RX ADMIN — HYDROMORPHONE HYDROCHLORIDE 1 MG: 1 INJECTION, SOLUTION INTRAMUSCULAR; INTRAVENOUS; SUBCUTANEOUS at 11:23

## 2018-12-20 RX ADMIN — Medication 10 ML: at 15:26

## 2018-12-20 RX ADMIN — DIPHENHYDRAMINE HYDROCHLORIDE 25 MG: 50 INJECTION, SOLUTION INTRAMUSCULAR; INTRAVENOUS at 19:53

## 2018-12-20 RX ADMIN — POLYETHYLENE GLYCOL 3350 17 G: 17 POWDER, FOR SOLUTION ORAL at 09:45

## 2018-12-20 RX ADMIN — DIPHENHYDRAMINE HYDROCHLORIDE 25 MG: 50 INJECTION, SOLUTION INTRAMUSCULAR; INTRAVENOUS at 11:23

## 2018-12-20 RX ADMIN — DIPHENHYDRAMINE HYDROCHLORIDE 25 MG: 50 INJECTION, SOLUTION INTRAMUSCULAR; INTRAVENOUS at 15:27

## 2018-12-20 RX ADMIN — ENOXAPARIN SODIUM 40 MG: 40 INJECTION SUBCUTANEOUS at 09:45

## 2018-12-20 RX ADMIN — HYDROMORPHONE HYDROCHLORIDE 1 MG: 1 INJECTION, SOLUTION INTRAMUSCULAR; INTRAVENOUS; SUBCUTANEOUS at 19:54

## 2018-12-20 RX ADMIN — DOCUSATE SODIUM 100 MG: 100 CAPSULE, LIQUID FILLED ORAL at 09:45

## 2018-12-20 RX ADMIN — ONDANSETRON 4 MG: 2 INJECTION INTRAMUSCULAR; INTRAVENOUS at 06:55

## 2018-12-20 RX ADMIN — HYDROCODONE BITARTRATE AND ACETAMINOPHEN 1 TABLET: 10; 325 TABLET ORAL at 05:27

## 2018-12-20 RX ADMIN — PANTOPRAZOLE SODIUM 40 MG: 40 TABLET, DELAYED RELEASE ORAL at 09:45

## 2018-12-20 RX ADMIN — CYCLOBENZAPRINE HYDROCHLORIDE 10 MG: 10 TABLET, FILM COATED ORAL at 09:44

## 2018-12-20 RX ADMIN — DIPHENHYDRAMINE HYDROCHLORIDE 25 MG: 50 INJECTION, SOLUTION INTRAMUSCULAR; INTRAVENOUS at 06:55

## 2018-12-20 RX ADMIN — CITALOPRAM HYDROBROMIDE 10 MG: 20 TABLET ORAL at 20:47

## 2018-12-20 RX ADMIN — DEXTROSE MONOHYDRATE, SODIUM CHLORIDE, AND POTASSIUM CHLORIDE 100 ML/HR: 50; 4.5; 1.49 INJECTION, SOLUTION INTRAVENOUS at 21:54

## 2018-12-20 RX ADMIN — FOLIC ACID 1 MG: 1 TABLET ORAL at 09:45

## 2018-12-20 RX ADMIN — HYDROMORPHONE HYDROCHLORIDE 1 MG: 1 INJECTION, SOLUTION INTRAMUSCULAR; INTRAVENOUS; SUBCUTANEOUS at 03:24

## 2018-12-20 NOTE — PROGRESS NOTES
Oncology Nursing Communication Tool  4:03 PM  12/20/2018     Bedside shift change report given to Jake Morales RN (incoming nurse) by Eliseo Tabares (outgoing nurse) on [employer]. Report included the following information SBAR. Shift Summary:       Issues for physician to address: Oncology Shift Note   Admission Date 12/12/2018   Admission Diagnosis Sickle cell crisis ([facility] [address]   Code Status Full Code   Consults IP CONSULT TO HOSPITALIST  IP CONSULT TO ONCOLOGY      Cardiac Monitoring [] Yes [] No      Purposeful Hourly Rounding [] Yes    Mukund Score Total Score: 1   Mukund score 3 or > [] Bed Alarm [] Avasys [] 1:1 sitter [] Patient refused (Place signed refusal form in chart)      Pain Managed [] Yes [] No    Key Pain Meds             HYDROcodone-acetaminophen (NORCO)  mg tablet Take 1 Tab by mouth every six (6) hours as needed. Max Daily Amount: 4 Tabs. fentaNYL (DURAGESIC) 75 mcg/hr 1 Patch by TransDERmal route every seventy-two (72) hours. Max Daily Amount: 1 Patch. Influenza Vaccine Received Flu Vaccine for Current Season (usually Sept-March): Yes           Oxygen needs? [] Room air Oxygen @  []1L    []2L    []3L   []4L    []5L   []6L     Use home O2? [] Yes [] No  Perform O2 challenge test using  smartphrase (.oxygenchallenge)      Last bowel movement Last Bowel Movement Date: 12/12/18  bowel movement      Urinary Catheter             LDAs                                    Readmission Risk Assessment Tool Score High Risk            22       Total Score        3 Has Seen PCP in Last 6 Months (Yes=3, No=0)    3 Patient Length of Stay (>5 days = 3)    9 IP Visits Last 12 Months (1-3=4, 4=9, >4=11)    5 Pt. Coverage (Medicare=5 , Medicaid, or Self-Pay=4)    2 Charlson Comorbidity Score (Age + Comorbid Conditions)        Criteria that do not apply:    . Living with Significant Other. Assisted Living. LTAC. SNF.  or   Rehab       Expected Length of Stay 2d 16h   Actual Length of Stay [address] [facility]

## 2018-12-20 NOTE — PROGRESS NOTES
Patient requested a midline to be placed this morning; spoke with Dr. Angelina Maya and he agreed for this. Spoke to [facility] team RN) regarding patient's request and she informed me that they will come down and take a look. She is familiar with pt's limited vascular access. Will continue to monitor situation at this time.

## 2018-12-20 NOTE — PROGRESS NOTES
Midline insertion procedure note:  Pt has very limited vascular access. Procedure explained to Patient Massiel Cervantes  along with risks and benefits. Procedure teaching completed. Pre procedure assessment done. Patient denies questions or concerns at this time. Midline information booklet given to patient, left at bedside. .  Midline sign over the [facility]. Maximum sterile barrier precautions observed throughout procedure. Lidocaine 1% 5 ml sc injected to site prior to access the vein. Cannulated Basilic vein using ultrasound guidance. Inserted 4 Fr. single lumen midline to Right arm. Blood return verified and flushed with 20ml normal saline. Sterile dressing applied with Biopatch, StatLock and occlusive dressing as per protocol. Curos caps applied to port. Patient tolerated procedure well with minimal blood loss. Reason for access : Reliable IV access  Complications related to insertion : First attempt was to Brachial vein. Was able to access and place dilator. When Midline was being inserted and dilator broken the midline pushed back due to venospasm. I could not save the Midline from coming out. Second placement in the Basilic also caused a venospasm, however, I was able to save this line . Good blood return from Midline at end of placement. This midline to be removed on or before January 18, 2018   See nursing message on [place] for midline reminders. Midline is CT and MRI compatible. Inserted by : Josafat Jones RN Vascular Access Nurse  Assisted by : Kimberly Renae RN Vascular Access Nurse  Catheter Length : 14 cm   External Length : 0 cm   Right arm circumference : 29 Cm  Catheter occupies 21 % of vein. Type of Midline: BARD  Ref#: [id number]  Lot#: [id number]  Expiration Date: 2020-03-31  Informed primary nurse Bev SAMIR Restrepo midline is ready for use and to hang new infusion tubing prior to use.     Josafat Jones RN, BSN, [facility]      Vascular Access Nurse

## 2018-12-20 NOTE — PROGRESS NOTES
Hematology Oncology Progress Note         Follow up for: SC crisis    Chart notes reviewed since last visit. Case discussed with following: nursing staff. Patient complains of the following: States her pain is better but states she has to have IV pain meds still. Additional concerns noted by the staff:     Patient Vitals for the past 24 hrs:   BP Temp Pulse Resp SpO2   12/20/18 0745 108/66 98.4 °F (36.9 °C) 94 16 100 %   12/20/18 0320 116/68 98.4 °F (36.9 °C) 95 20 100 %   12/19/18 2310 123/69 99.1 °F (37.3 °C) 97 20 100 %   12/19/18 1928 131/71 99 °F (37.2 °C) (!) 103 20 100 %   12/19/18 1543 116/66 98.7 °F (37.1 °C) 92 18 100 %   12/19/18 1129 115/65 98.8 °F (37.1 °C) 92 16 98 %     ROS negative at present for 11 organ systems except as noted. Physical Examination:  Constitutional Alert, cooperative, oriented. Looks comfortable. Mood and affect appropriate. Appears close to chronological age. Well nourished. Well developed. Head Normocephalic; no scars   Eyes Conjunctivae and sclerae are clear and without icterus. Pupils are round   ENMT Sinuses are nontender. No oral exudates, ulcers, masses, thrush or mucositis. Oropharynx clear. Tongue normal.   Neck Supple without masses or thyromegaly. No jugular venous distension. Hematologic/Lymphatic No petechiae or purpura. No tender or palpable lymph nodes noted   Respiratory Lungs are clear to auscultation without rhonchi or wheezing. Cardiovascular Regular rate and rhythm of heart without murmurs, gallops or rubs. Chest / Line Site Chest is symmetric with no chest wall deformities. Abdomen Non-tender, non-distended, no masses, ascites or hepatosplenomegaly. Good bowel sounds. Musculoskeletal No tenderness or swelling, normal range of motion without obvious weakness. Extremities No visible deformities, no cyanosis, clubbing or edema. Skin No rashes, scars, or lesions suggestive of malignancy. No petechiae, purpura, or ecchymoses.  No excoriations. Neurologic No sensory or motor deficits noted but not specifically tested. Psychiatric Alert and oriented. Coherent speech. Verbalizes understanding of our discussions today. Labs:  No results found for this or any previous visit (from the past 24 hour(s)). Assessment and Plan:   SC crisis -   - Appears comfortable, states pain improving, not wanting transfusion.  - Is not on hydrea and refuses. - Continue hydration, home fentanyl patch.  - Recommend transfusion to try and get her pain better quicker but she refuses.

## 2018-12-21 LAB
ANION GAP SERPL CALC-SCNC: 4 MMOL/L (ref 5–15)
BASOPHILS # BLD: 0.1 K/UL (ref 0–0.1)
BASOPHILS NFR BLD: 1 % (ref 0–1)
BUN SERPL-MCNC: 7 MG/DL (ref 6–20)
BUN/CREAT SERPL: 10 (ref 12–20)
CALCIUM SERPL-MCNC: 8.2 MG/DL (ref 8.5–10.1)
CHLORIDE SERPL-SCNC: 103 MMOL/L (ref 97–108)
CO2 SERPL-SCNC: 31 MMOL/L (ref 21–32)
CREAT SERPL-MCNC: 0.68 MG/DL (ref 0.55–1.02)
DIFFERENTIAL METHOD BLD: ABNORMAL
EOSINOPHIL # BLD: 0.3 K/UL (ref 0–0.4)
EOSINOPHIL NFR BLD: 3 % (ref 0–7)
ERYTHROCYTE [DISTWIDTH] IN BLOOD BY AUTOMATED COUNT: 17.2 % (ref 11.5–14.5)
GLUCOSE SERPL-MCNC: 89 MG/DL (ref 65–100)
HCT VFR BLD AUTO: 16.1 % (ref 35–47)
HGB BLD-MCNC: 5.5 G/DL (ref 11.5–16)
IMM GRANULOCYTES # BLD: 0 K/UL (ref 0–0.04)
IMM GRANULOCYTES NFR BLD AUTO: 0 % (ref 0–0.5)
LYMPHOCYTES # BLD: 3 K/UL (ref 0.8–3.5)
LYMPHOCYTES NFR BLD: 36 % (ref 12–49)
MCH RBC QN AUTO: 31.3 PG (ref 26–34)
MCHC RBC AUTO-ENTMCNC: 34.2 G/DL (ref 30–36.5)
MCV RBC AUTO: 91.5 FL (ref 80–99)
MONOCYTES # BLD: 1.2 K/UL (ref 0–1)
MONOCYTES NFR BLD: 14 % (ref 5–13)
NEUTS SEG # BLD: 3.7 K/UL (ref 1.8–8)
NEUTS SEG NFR BLD: 46 % (ref 32–75)
NRBC # BLD: 0.04 K/UL (ref 0–0.01)
NRBC BLD-RTO: 0.5 PER 100 WBC
PLATELET # BLD AUTO: 177 K/UL (ref 150–400)
PMV BLD AUTO: 11 FL (ref 8.9–12.9)
POTASSIUM SERPL-SCNC: 4.2 MMOL/L (ref 3.5–5.1)
RBC # BLD AUTO: 1.76 M/UL (ref 3.8–5.2)
RBC MORPH BLD: ABNORMAL
RETICS # AUTO: 0.15 M/UL (ref 0.02–0.08)
RETICS/RBC NFR AUTO: 8.4 % (ref 0.7–2.1)
SODIUM SERPL-SCNC: 138 MMOL/L (ref 136–145)
WBC # BLD AUTO: 8.3 K/UL (ref 3.6–11)

## 2018-12-21 PROCEDURE — 74011250636 HC RX REV CODE- 250/636: Performed by: INTERNAL MEDICINE

## 2018-12-21 PROCEDURE — 74011250636 HC RX REV CODE- 250/636: Performed by: EMERGENCY MEDICINE

## 2018-12-21 PROCEDURE — 80048 BASIC METABOLIC PNL TOTAL CA: CPT

## 2018-12-21 PROCEDURE — 36415 COLL VENOUS BLD VENIPUNCTURE: CPT

## 2018-12-21 PROCEDURE — 74011000250 HC RX REV CODE- 250: Performed by: INTERNAL MEDICINE

## 2018-12-21 PROCEDURE — 65660000000 HC RM CCU STEPDOWN

## 2018-12-21 PROCEDURE — 74011250637 HC RX REV CODE- 250/637: Performed by: INTERNAL MEDICINE

## 2018-12-21 PROCEDURE — 85025 COMPLETE CBC W/AUTO DIFF WBC: CPT

## 2018-12-21 PROCEDURE — 85045 AUTOMATED RETICULOCYTE COUNT: CPT

## 2018-12-21 RX ADMIN — CYCLOBENZAPRINE HYDROCHLORIDE 10 MG: 10 TABLET, FILM COATED ORAL at 21:12

## 2018-12-21 RX ADMIN — Medication 10 ML: at 13:51

## 2018-12-21 RX ADMIN — DIPHENHYDRAMINE HYDROCHLORIDE 25 MG: 50 INJECTION, SOLUTION INTRAMUSCULAR; INTRAVENOUS at 05:51

## 2018-12-21 RX ADMIN — HYDROMORPHONE HYDROCHLORIDE 1 MG: 1 INJECTION, SOLUTION INTRAMUSCULAR; INTRAVENOUS; SUBCUTANEOUS at 15:56

## 2018-12-21 RX ADMIN — CITALOPRAM HYDROBROMIDE 10 MG: 20 TABLET ORAL at 18:40

## 2018-12-21 RX ADMIN — HYDROMORPHONE HYDROCHLORIDE 1 MG: 1 INJECTION, SOLUTION INTRAMUSCULAR; INTRAVENOUS; SUBCUTANEOUS at 12:44

## 2018-12-21 RX ADMIN — PANTOPRAZOLE SODIUM 40 MG: 40 TABLET, DELAYED RELEASE ORAL at 09:10

## 2018-12-21 RX ADMIN — DIPHENHYDRAMINE HYDROCHLORIDE 25 MG: 50 INJECTION, SOLUTION INTRAMUSCULAR; INTRAVENOUS at 12:44

## 2018-12-21 RX ADMIN — DIPHENHYDRAMINE HYDROCHLORIDE 25 MG: 50 INJECTION, SOLUTION INTRAMUSCULAR; INTRAVENOUS at 18:40

## 2018-12-21 RX ADMIN — FOLIC ACID 1 MG: 1 TABLET ORAL at 09:11

## 2018-12-21 RX ADMIN — HYDROMORPHONE HYDROCHLORIDE 1 MG: 1 INJECTION, SOLUTION INTRAMUSCULAR; INTRAVENOUS; SUBCUTANEOUS at 22:29

## 2018-12-21 RX ADMIN — ONDANSETRON 4 MG: 2 INJECTION INTRAMUSCULAR; INTRAVENOUS at 18:40

## 2018-12-21 RX ADMIN — DEXTROSE MONOHYDRATE, SODIUM CHLORIDE, AND POTASSIUM CHLORIDE 75 ML/HR: 50; 4.5; 1.49 INJECTION, SOLUTION INTRAVENOUS at 10:29

## 2018-12-21 RX ADMIN — HYDROCODONE BITARTRATE AND ACETAMINOPHEN 1 TABLET: 10; 325 TABLET ORAL at 13:51

## 2018-12-21 RX ADMIN — POLYETHYLENE GLYCOL 3350 17 G: 17 POWDER, FOR SOLUTION ORAL at 09:11

## 2018-12-21 RX ADMIN — DEXTROSE MONOHYDRATE, SODIUM CHLORIDE, AND POTASSIUM CHLORIDE 75 ML/HR: 50; 4.5; 1.49 INJECTION, SOLUTION INTRAVENOUS at 23:55

## 2018-12-21 RX ADMIN — CYCLOBENZAPRINE HYDROCHLORIDE 10 MG: 10 TABLET, FILM COATED ORAL at 10:29

## 2018-12-21 RX ADMIN — ONDANSETRON 4 MG: 2 INJECTION INTRAMUSCULAR; INTRAVENOUS at 12:44

## 2018-12-21 RX ADMIN — HYDROMORPHONE HYDROCHLORIDE 1 MG: 1 INJECTION, SOLUTION INTRAMUSCULAR; INTRAVENOUS; SUBCUTANEOUS at 05:52

## 2018-12-21 RX ADMIN — HYDROMORPHONE HYDROCHLORIDE 1 MG: 1 INJECTION, SOLUTION INTRAMUSCULAR; INTRAVENOUS; SUBCUTANEOUS at 00:13

## 2018-12-21 RX ADMIN — CYCLOBENZAPRINE HYDROCHLORIDE 10 MG: 10 TABLET, FILM COATED ORAL at 00:12

## 2018-12-21 RX ADMIN — ONDANSETRON 4 MG: 2 INJECTION INTRAMUSCULAR; INTRAVENOUS at 05:51

## 2018-12-21 RX ADMIN — HYDROMORPHONE HYDROCHLORIDE 1 MG: 1 INJECTION, SOLUTION INTRAMUSCULAR; INTRAVENOUS; SUBCUTANEOUS at 18:40

## 2018-12-21 RX ADMIN — HYDROMORPHONE HYDROCHLORIDE 1 MG: 1 INJECTION, SOLUTION INTRAMUSCULAR; INTRAVENOUS; SUBCUTANEOUS at 09:11

## 2018-12-21 RX ADMIN — ENOXAPARIN SODIUM 40 MG: 40 INJECTION SUBCUTANEOUS at 09:11

## 2018-12-21 RX ADMIN — DOCUSATE SODIUM 100 MG: 100 CAPSULE, LIQUID FILLED ORAL at 09:10

## 2018-12-21 RX ADMIN — HYDROCODONE BITARTRATE AND ACETAMINOPHEN 1 TABLET: 10; 325 TABLET ORAL at 19:38

## 2018-12-21 NOTE — PROGRESS NOTES
Addendum:  Further drop in hemoglobin noted. At this level she could benefit from a transfusion. Will discuss.   Misa Zamarripa

## 2018-12-21 NOTE — PROGRESS NOTES
General Daily Progress Note    Admit Date: 12/12/2018    Subjective:     Patient continues to complain of pain. Current Facility-Administered Medications   Medication Dose Route Frequency    saline peripheral flush soln 10 mL  10 mL InterCATHeter PRN    SALINE PERIPHERAL FLUSH Q8H soln 10 mL  10 mL InterCATHeter Q8H    dextrose 5% - 0.45% NaCl with KCl 20 mEq/L infusion  100 mL/hr IntraVENous CONTINUOUS    diphenhydrAMINE (BENADRYL) injection 25 mg  25 mg IntraVENous Q4H PRN    HYDROmorphone (PF) (DILAUDID) injection 1 mg  1 mg IntraVENous Q3H PRN    citalopram (CELEXA) tablet 10 mg  10 mg Oral QPM    cyclobenzaprine (FLEXERIL) tablet 10 mg  10 mg Oral TID PRN    fentaNYL (DURAGESIC) 75 mcg/hr patch 1 Patch  1 Patch TransDERmal D05B    folic acid (FOLVITE) tablet 1 mg  1 mg Oral DAILY    HYDROcodone-acetaminophen (NORCO)  mg tablet 1 Tab  1 Tab Oral Q6H PRN    pantoprazole (PROTONIX) tablet 40 mg  40 mg Oral DAILY    sodium chloride (NS) flush 5-10 mL  5-10 mL IntraVENous Q8H    sodium chloride (NS) flush 5-10 mL  5-10 mL IntraVENous PRN    acetaminophen (TYLENOL) tablet 650 mg  650 mg Oral Q6H PRN    ondansetron (ZOFRAN) injection 4 mg  4 mg IntraVENous Q6H PRN    docusate sodium (COLACE) capsule 100 mg  100 mg Oral DAILY PRN    enoxaparin (LOVENOX) injection 40 mg  40 mg SubCUTAneous Q24H    polyethylene glycol (MIRALAX) packet 17 g  17 g Oral BID PRN        Review of Systems  A comprehensive review of systems was negative. Objective:     Patient Vitals for the past 24 hrs:   BP Temp Pulse Resp SpO2   12/20/18 1958 135/73 99 °F (37.2 °C) 100 18 100 %   12/20/18 1605 120/63 98 °F (36.7 °C) 95 16 99 %   12/20/18 1145 117/58 98.6 °F (37 °C) 95 18 100 %   12/20/18 0745 108/66 98.4 °F (36.9 °C) 94 16 100 %   12/20/18 0320 116/68 98.4 °F (36.9 °C) 95 20 100 %     No intake/output data recorded. 12/19 0701 - 12/20 1900  In: 8055 [P.O.:400;  I.V.:4200]  Out: -     Physical Exam:   Visit Vitals  /73 (BP 1 Location: Left arm, BP Patient Position: Sitting)   Pulse 100   Temp 99 °F (37.2 °C)   Resp 18   Ht 5' 11\" (1.803 m)   Wt 169 lb 12.1 oz (77 kg)   SpO2 100%   Breastfeeding? No   BMI 23.68 kg/m²     General appearance: alert, cooperative, no distress, appears stated age  Neck: supple, symmetrical, trachea midline, no adenopathy, thyroid: not enlarged, symmetric, no tenderness/mass/nodules, no carotid bruit and no JVD  Lungs: clear to auscultation bilaterally  Heart: regular rate and rhythm, S1, S2 normal, no murmur, click, rub or gallop  Abdomen: soft, non-tender. Bowel sounds normal. No masses,  no organomegaly  Extremities: extremities normal, atraumatic, no cyanosis, trace edema        Data Review No results found for this or any previous visit (from the past 24 hour(s)). Assessment:     Principal Problem:    Sickle cell crisis ([facility]) (5/22/2017)    Active Problems:    Hypertension (10/3/2014)      Depression (10/5/2014)        Plan:     1. Continue treatment of painful crisis which is gradually improving. 2.  Hydration adequate.

## 2018-12-21 NOTE — PROGRESS NOTES
General Daily Progress Note    Admit Date: 12/12/2018    Subjective:     Patient has no complaint. Current Facility-Administered Medications   Medication Dose Route Frequency    saline peripheral flush soln 10 mL  10 mL InterCATHeter PRN    SALINE PERIPHERAL FLUSH Q8H soln 10 mL  10 mL InterCATHeter Q8H    dextrose 5% - 0.45% NaCl with KCl 20 mEq/L infusion  75 mL/hr IntraVENous CONTINUOUS    diphenhydrAMINE (BENADRYL) injection 25 mg  25 mg IntraVENous Q4H PRN    HYDROmorphone (PF) (DILAUDID) injection 1 mg  1 mg IntraVENous Q3H PRN    citalopram (CELEXA) tablet 10 mg  10 mg Oral QPM    cyclobenzaprine (FLEXERIL) tablet 10 mg  10 mg Oral TID PRN    fentaNYL (DURAGESIC) 75 mcg/hr patch 1 Patch  1 Patch TransDERmal H77D    folic acid (FOLVITE) tablet 1 mg  1 mg Oral DAILY    HYDROcodone-acetaminophen (NORCO)  mg tablet 1 Tab  1 Tab Oral Q6H PRN    pantoprazole (PROTONIX) tablet 40 mg  40 mg Oral DAILY    sodium chloride (NS) flush 5-10 mL  5-10 mL IntraVENous Q8H    sodium chloride (NS) flush 5-10 mL  5-10 mL IntraVENous PRN    acetaminophen (TYLENOL) tablet 650 mg  650 mg Oral Q6H PRN    ondansetron (ZOFRAN) injection 4 mg  4 mg IntraVENous Q6H PRN    docusate sodium (COLACE) capsule 100 mg  100 mg Oral DAILY PRN    enoxaparin (LOVENOX) injection 40 mg  40 mg SubCUTAneous Q24H    polyethylene glycol (MIRALAX) packet 17 g  17 g Oral BID PRN        Review of Systems  A comprehensive review of systems was negative. Objective:     Patient Vitals for the past 24 hrs:   BP Temp Pulse Resp SpO2   12/21/18 0800 122/71 98.8 °F (37.1 °C) 90 18 99 %   12/21/18 0320 115/69 99.1 °F (37.3 °C) 97 18 97 %   12/21/18 0001 129/70 98.4 °F (36.9 °C) 91 18 94 %   12/20/18 1958 135/73 99 °F (37.2 °C) 100 18 100 %   12/20/18 1605 120/63 98 °F (36.7 °C) 95 16 99 %   12/20/18 1145 117/58 98.6 °F (37 °C) 95 18 100 %     No intake/output data recorded.   12/19 1901 - 12/21 0700  In: 400 [P.O.:400]  Out: - Physical Exam:   Visit Vitals  /71 (BP 1 Location: Left arm, BP Patient Position: Sitting)   Pulse 90   Temp 98.8 °F (37.1 °C)   Resp 18   Ht 5' 11\" (1.803 m)   Wt 169 lb 12.1 oz (77 kg)   SpO2 99%   Breastfeeding? No   BMI 23.68 kg/m²     General appearance: alert, cooperative, no distress, appears stated age  Neck: supple, symmetrical, trachea midline, no adenopathy, thyroid: not enlarged, symmetric, no tenderness/mass/nodules, no carotid bruit and no JVD  Lungs: clear to auscultation bilaterally  Heart: regular rate and rhythm, S1, S2 normal, no murmur, click, rub or gallop  Abdomen: soft, non-tender. Bowel sounds normal. No masses,  no organomegaly  Extremities: extremities normal, atraumatic, no cyanosis or edema        Data Review   Recent Results (from the past 24 hour(s))   CBC WITH AUTOMATED DIFF    Collection Time: 12/21/18  5:44 AM   Result Value Ref Range    WBC 8.3 3.6 - 11.0 K/uL    RBC 1.76 (L) 3.80 - 5.20 M/uL    HGB 5.5 (LL) 11.5 - 16.0 g/dL    HCT 16.1 (LL) 35.0 - 47.0 %    MCV 91.5 80.0 - 99.0 FL    MCH 31.3 26.0 - 34.0 PG    MCHC 34.2 30.0 - 36.5 g/dL    RDW 17.2 (H) 11.5 - 14.5 %    PLATELET 174 036 - 159 K/uL    MPV 11.0 8.9 - 12.9 FL    NRBC 0.5 (H) 0  WBC    ABSOLUTE NRBC 0.04 (H) 0.00 - 0.01 K/uL    NEUTROPHILS 46 32 - 75 %    LYMPHOCYTES 36 12 - 49 %    MONOCYTES 14 (H) 5 - 13 %    EOSINOPHILS 3 0 - 7 %    BASOPHILS 1 0 - 1 %    IMMATURE GRANULOCYTES 0 0.0 - 0.5 %    ABS. NEUTROPHILS 3.7 1.8 - 8.0 K/UL    ABS. LYMPHOCYTES 3.0 0.8 - 3.5 K/UL    ABS. MONOCYTES 1.2 (H) 0.0 - 1.0 K/UL    ABS. EOSINOPHILS 0.3 0.0 - 0.4 K/UL    ABS. BASOPHILS 0.1 0.0 - 0.1 K/UL    ABS. IMM.  GRANS. 0.0 0.00 - 0.04 K/UL    DF MANUAL      RBC COMMENTS ANISOCYTOSIS  1+        RBC COMMENTS TARGET CELLS  1+        RBC COMMENTS SICKLE CELLS  1+       METABOLIC PANEL, BASIC    Collection Time: 12/21/18  5:44 AM   Result Value Ref Range    Sodium 138 136 - 145 mmol/L    Potassium 4.2 3.5 - 5.1 mmol/L Chloride 103 97 - 108 mmol/L    CO2 31 21 - 32 mmol/L    Anion gap 4 (L) 5 - 15 mmol/L    Glucose 89 65 - 100 mg/dL    BUN 7 6 - 20 MG/DL    Creatinine 0.68 0.55 - 1.02 MG/DL    BUN/Creatinine ratio 10 (L) 12 - 20      GFR est AA >60 >60 ml/min/1.73m2    GFR est non-AA >60 >60 ml/min/1.73m2    Calcium 8.2 (L) 8.5 - 10.1 MG/DL   RETICULOCYTE COUNT    Collection Time: 12/21/18  5:44 AM   Result Value Ref Range    Reticulocyte count 8.4 (H) 0.7 - 2.1 %    Absolute Retic Cnt. 0.1471 (H) 0.0164 - 0.0776 M/ul           Assessment:     Principal Problem:    Sickle cell crisis ([facility] [address] (5/22/2017)    Active Problems:    Hypertension (10/3/2014)      Depression (10/5/2014)        Plan:     1. Continue treatment of painful sickle crises. Course is slow but progressive.

## 2018-12-21 NOTE — PROGRESS NOTES
[id number] Patient with critical HGB of 5.5 and HCT of 16.1. Dr. Ronald Covington who is on call for Dr. Christian Manual paged. [phone] Dr. Ronald Covington notified of critical value, informed of previous results for reference, and informed that patient was refusing transfusions at this time. No new orders given by Dr. Ronald Covington.

## 2018-12-21 NOTE — PROGRESS NOTES
Oncology Nursing Communication Tool  6:55 PM  12/21/2018     \"Bedside\" shift change report given to SAMIR Aranda (incoming nurse) by Gregorio Gandhi (outgoing nurse) on [facility]. Report included the following information        Shift Summary: No significant events      Issues for physician to address: BM         Oncology Shift Note   Admission Date 12/12/2018   Admission Diagnosis Sickle cell crisis ([address]   Code Status Full Code   Consults IP CONSULT TO HOSPITALIST  IP CONSULT TO ONCOLOGY      Cardiac Monitoring [x] Yes [] No      Purposeful Hourly Rounding [x] Yes    Mukund Score Total Score: 1   Mukund score 3 or > [] Bed Alarm [] Avasys [] 1:1 sitter [] Patient refused (Place signed refusal form in chart)      Pain Managed [] Yes [x] No    Key Pain Meds             HYDROcodone-acetaminophen (NORCO)  mg tablet Take 1 Tab by mouth every six (6) hours as needed. Max Daily Amount: 4 Tabs. fentaNYL (DURAGESIC) 75 mcg/hr 1 Patch by TransDERmal route every seventy-two (72) hours. Max Daily Amount: 1 Patch. Influenza Vaccine Received Flu Vaccine for Current Season (usually Sept-March): Yes           Oxygen needs? [] Room air Oxygen @  []1L    [x]2L    []3L   []4L    []5L   []6L     Use home O2? [] Yes [x] No  Perform O2 challenge test using  smartphrase (.oxygenchallenge)      Last bowel movement Last Bowel Movement Date: 12/11/18  bowel movement      Urinary Catheter             LDAs                                    Readmission Risk Assessment Tool Score High Risk            22       Total Score        3 Has Seen PCP in Last 6 Months (Yes=3, No=0)    3 Patient Length of Stay (>5 days = 3)    9 IP Visits Last 12 Months (1-3=4, 4=9, >4=11)    5 Pt. Coverage (Medicare=5 , Medicaid, or Self-Pay=4)    2 Charlson Comorbidity Score (Age + Comorbid Conditions)        Criteria that do not apply:    . Living with Significant Other. Assisted Living. LTAC. SNF.  or   Rehab       Expected Length of Stay 2d 16h   Actual Length of Stay [address] [facility]

## 2018-12-21 NOTE — PROGRESS NOTES
Hematology Oncology Progress Note         Follow up for: SC crisis    Chart notes reviewed since last visit. Case discussed with following: nursing staff. Patient complains of the following: States her pain is better but still feels she needs IV dilaudid. States she doesn't want a transfusion but is willing to discuss with Dr Darleen Roth. Additional concerns noted by the staff:     Patient Vitals for the past 24 hrs:   BP Temp Pulse Resp SpO2   12/21/18 0800 122/71 98.8 °F (37.1 °C) 90 18 99 %   12/21/18 0320 115/69 99.1 °F (37.3 °C) 97 18 97 %   12/21/18 0001 129/70 98.4 °F (36.9 °C) 91 18 94 %   12/20/18 1958 135/73 99 °F (37.2 °C) 100 18 100 %   12/20/18 1605 120/63 98 °F (36.7 °C) 95 16 99 %   12/20/18 1145 117/58 98.6 °F (37 °C) 95 18 100 %     ROS negative at present for 11 organ systems except as noted. Physical Examination:  Constitutional Alert, cooperative, oriented. Looks comfortable. Mood and affect appropriate. Appears close to chronological age. Well nourished. Well developed. Head Normocephalic; no scars   Eyes Conjunctivae and sclerae are clear and without icterus. Pupils are round   ENMT Sinuses are nontender. No oral exudates, ulcers, masses, thrush or mucositis. Oropharynx clear. Tongue normal.   Neck Supple without masses or thyromegaly. No jugular venous distension. Hematologic/Lymphatic No petechiae or purpura. No tender or palpable lymph nodes noted   Respiratory Lungs are clear to auscultation without rhonchi or wheezing. Cardiovascular Regular rate and rhythm of heart without murmurs, gallops or rubs. Chest / Line Site Chest is symmetric with no chest wall deformities. Abdomen Non-tender, non-distended, no masses, ascites or hepatosplenomegaly. Good bowel sounds. Musculoskeletal No tenderness or swelling, normal range of motion without obvious weakness. Extremities No visible deformities, no cyanosis, clubbing or edema.     Skin No rashes, scars, or lesions suggestive of malignancy. No petechiae, purpura, or ecchymoses. No excoriations. Neurologic No sensory or motor deficits noted but not specifically tested. Psychiatric Alert and oriented. Coherent speech. Labs:  Recent Results (from the past 24 hour(s))   CBC WITH AUTOMATED DIFF    Collection Time: 12/21/18  5:44 AM   Result Value Ref Range    WBC 8.3 3.6 - 11.0 K/uL    RBC 1.76 (L) 3.80 - 5.20 M/uL    HGB 5.5 (LL) 11.5 - 16.0 g/dL    HCT 16.1 (LL) 35.0 - 47.0 %    MCV 91.5 80.0 - 99.0 FL    MCH 31.3 26.0 - 34.0 PG    MCHC 34.2 30.0 - 36.5 g/dL    RDW 17.2 (H) 11.5 - 14.5 %    PLATELET 844 890 - 896 K/uL    MPV 11.0 8.9 - 12.9 FL    NRBC 0.5 (H) 0  WBC    ABSOLUTE NRBC 0.04 (H) 0.00 - 0.01 K/uL    NEUTROPHILS 46 32 - 75 %    LYMPHOCYTES 36 12 - 49 %    MONOCYTES 14 (H) 5 - 13 %    EOSINOPHILS 3 0 - 7 %    BASOPHILS 1 0 - 1 %    IMMATURE GRANULOCYTES 0 0.0 - 0.5 %    ABS. NEUTROPHILS 3.7 1.8 - 8.0 K/UL    ABS. LYMPHOCYTES 3.0 0.8 - 3.5 K/UL    ABS. MONOCYTES 1.2 (H) 0.0 - 1.0 K/UL    ABS. EOSINOPHILS 0.3 0.0 - 0.4 K/UL    ABS. BASOPHILS 0.1 0.0 - 0.1 K/UL    ABS. IMM.  GRANS. 0.0 0.00 - 0.04 K/UL    DF MANUAL      RBC COMMENTS ANISOCYTOSIS  1+        RBC COMMENTS TARGET CELLS  1+        RBC COMMENTS SICKLE CELLS  1+       METABOLIC PANEL, BASIC    Collection Time: 12/21/18  5:44 AM   Result Value Ref Range    Sodium 138 136 - 145 mmol/L    Potassium 4.2 3.5 - 5.1 mmol/L    Chloride 103 97 - 108 mmol/L    CO2 31 21 - 32 mmol/L    Anion gap 4 (L) 5 - 15 mmol/L    Glucose 89 65 - 100 mg/dL    BUN 7 6 - 20 MG/DL    Creatinine 0.68 0.55 - 1.02 MG/DL    BUN/Creatinine ratio 10 (L) 12 - 20      GFR est AA >60 >60 ml/min/1.73m2    GFR est non-AA >60 >60 ml/min/1.73m2    Calcium 8.2 (L) 8.5 - 10.1 MG/DL   RETICULOCYTE COUNT    Collection Time: 12/21/18  5:44 AM   Result Value Ref Range    Reticulocyte count 8.4 (H) 0.7 - 2.1 %    Absolute Retic Cnt. 0.1471 (H) 0.0164 - 0.0776 M/ul       Assessment and Plan: SC crisis -   - Appears comfortable, states pain improving, not wanting transfusion again today despite Hbg dipping to 5.5 today. States she would want to talk with Dr Angelina Maya before agreeing to any blood, states she wants to avoid transfusion unless she \"is dying. \"  - Is not on hydrea and refuses. - Continue hydration, home fentanyl patch.  - Recommend transfusion to try and get her pain better quicker but she refuses. Please call with questions over the weekend, otherwise my partner will see her on Monday.

## 2018-12-22 LAB
RETICS # AUTO: 0.16 M/UL (ref 0.02–0.08)
RETICS/RBC NFR AUTO: 9.6 % (ref 0.7–2.1)

## 2018-12-22 PROCEDURE — 74011250637 HC RX REV CODE- 250/637: Performed by: INTERNAL MEDICINE

## 2018-12-22 PROCEDURE — 94760 N-INVAS EAR/PLS OXIMETRY 1: CPT

## 2018-12-22 PROCEDURE — 74011250636 HC RX REV CODE- 250/636: Performed by: EMERGENCY MEDICINE

## 2018-12-22 PROCEDURE — 85045 AUTOMATED RETICULOCYTE COUNT: CPT

## 2018-12-22 PROCEDURE — 74011250636 HC RX REV CODE- 250/636: Performed by: INTERNAL MEDICINE

## 2018-12-22 PROCEDURE — 77010033678 HC OXYGEN DAILY

## 2018-12-22 PROCEDURE — 74011000250 HC RX REV CODE- 250: Performed by: INTERNAL MEDICINE

## 2018-12-22 PROCEDURE — 36415 COLL VENOUS BLD VENIPUNCTURE: CPT

## 2018-12-22 PROCEDURE — 65660000000 HC RM CCU STEPDOWN

## 2018-12-22 RX ORDER — HYDROMORPHONE HYDROCHLORIDE 2 MG/ML
1 INJECTION, SOLUTION INTRAMUSCULAR; INTRAVENOUS; SUBCUTANEOUS
Status: DISCONTINUED | OUTPATIENT
Start: 2018-12-22 | End: 2018-12-24

## 2018-12-22 RX ADMIN — FOLIC ACID 1 MG: 1 TABLET ORAL at 09:23

## 2018-12-22 RX ADMIN — PANTOPRAZOLE SODIUM 40 MG: 40 TABLET, DELAYED RELEASE ORAL at 09:23

## 2018-12-22 RX ADMIN — HYDROMORPHONE HYDROCHLORIDE 1 MG: 1 INJECTION, SOLUTION INTRAMUSCULAR; INTRAVENOUS; SUBCUTANEOUS at 16:44

## 2018-12-22 RX ADMIN — HYDROMORPHONE HYDROCHLORIDE 1 MG: 1 INJECTION, SOLUTION INTRAMUSCULAR; INTRAVENOUS; SUBCUTANEOUS at 13:27

## 2018-12-22 RX ADMIN — Medication 10 ML: at 13:29

## 2018-12-22 RX ADMIN — DOCUSATE SODIUM 100 MG: 100 CAPSULE, LIQUID FILLED ORAL at 09:23

## 2018-12-22 RX ADMIN — ONDANSETRON 4 MG: 2 INJECTION INTRAMUSCULAR; INTRAVENOUS at 07:24

## 2018-12-22 RX ADMIN — DIPHENHYDRAMINE HYDROCHLORIDE 25 MG: 50 INJECTION, SOLUTION INTRAMUSCULAR; INTRAVENOUS at 20:15

## 2018-12-22 RX ADMIN — HYDROCODONE BITARTRATE AND ACETAMINOPHEN 1 TABLET: 10; 325 TABLET ORAL at 14:45

## 2018-12-22 RX ADMIN — CYCLOBENZAPRINE HYDROCHLORIDE 10 MG: 10 TABLET, FILM COATED ORAL at 09:23

## 2018-12-22 RX ADMIN — HYDROMORPHONE HYDROCHLORIDE 1 MG: 2 INJECTION, SOLUTION INTRAMUSCULAR; INTRAVENOUS; SUBCUTANEOUS at 20:15

## 2018-12-22 RX ADMIN — DIPHENHYDRAMINE HYDROCHLORIDE 25 MG: 50 INJECTION, SOLUTION INTRAMUSCULAR; INTRAVENOUS at 07:24

## 2018-12-22 RX ADMIN — ONDANSETRON 4 MG: 2 INJECTION INTRAMUSCULAR; INTRAVENOUS at 13:28

## 2018-12-22 RX ADMIN — ONDANSETRON 4 MG: 2 INJECTION INTRAMUSCULAR; INTRAVENOUS at 20:15

## 2018-12-22 RX ADMIN — CITALOPRAM HYDROBROMIDE 10 MG: 20 TABLET ORAL at 18:10

## 2018-12-22 RX ADMIN — ONDANSETRON 4 MG: 2 INJECTION INTRAMUSCULAR; INTRAVENOUS at 02:27

## 2018-12-22 RX ADMIN — DIPHENHYDRAMINE HYDROCHLORIDE 25 MG: 50 INJECTION, SOLUTION INTRAMUSCULAR; INTRAVENOUS at 02:27

## 2018-12-22 RX ADMIN — HYDROMORPHONE HYDROCHLORIDE 1 MG: 1 INJECTION, SOLUTION INTRAMUSCULAR; INTRAVENOUS; SUBCUTANEOUS at 02:27

## 2018-12-22 RX ADMIN — Medication 10 ML: at 05:55

## 2018-12-22 RX ADMIN — HYDROMORPHONE HYDROCHLORIDE 1 MG: 1 INJECTION, SOLUTION INTRAMUSCULAR; INTRAVENOUS; SUBCUTANEOUS at 10:37

## 2018-12-22 RX ADMIN — DIPHENHYDRAMINE HYDROCHLORIDE 25 MG: 50 INJECTION, SOLUTION INTRAMUSCULAR; INTRAVENOUS at 13:28

## 2018-12-22 RX ADMIN — DEXTROSE MONOHYDRATE, SODIUM CHLORIDE, AND POTASSIUM CHLORIDE 75 ML/HR: 50; 4.5; 1.49 INJECTION, SOLUTION INTRAVENOUS at 13:02

## 2018-12-22 RX ADMIN — ENOXAPARIN SODIUM 40 MG: 40 INJECTION SUBCUTANEOUS at 09:23

## 2018-12-22 RX ADMIN — HYDROMORPHONE HYDROCHLORIDE 1 MG: 1 INJECTION, SOLUTION INTRAMUSCULAR; INTRAVENOUS; SUBCUTANEOUS at 07:24

## 2018-12-22 RX ADMIN — POLYETHYLENE GLYCOL 3350 17 G: 17 POWDER, FOR SOLUTION ORAL at 09:22

## 2018-12-22 NOTE — PROGRESS NOTES
Oncology Nursing Communication Tool  7:23 AM  12/22/2018     Bedside shift change report given to Mustapha Orozco RN (incoming nurse) by Tank Brown RN (outgoing nurse) on [facility]. Report included the following information SBAR, Kardex, MAR and Accordion. Shift Summary: PRN dilaudid x2, am labs drawn      Issues for physician to address: Telemetry order needs to be renewed or discontinued, patient says she hasn't had a bowel movement since before admission, no order to recheck hgb          Oncology Shift Note   Admission Date 12/12/2018   Admission Diagnosis Sickle cell crisis ([facility] [address]   Code Status Full Code   Consults IP CONSULT TO HOSPITALIST  IP CONSULT TO ONCOLOGY      Cardiac Monitoring [x] Yes [] No      Purposeful Hourly Rounding [x] Yes    Mukund Score Total Score: 1   Mukund score 3 or > [] Bed Alarm [] Avasys [] 1:1 sitter [] Patient refused (Place signed refusal form in chart)      Pain Managed [] Yes [] No    Key Pain Meds             HYDROcodone-acetaminophen (NORCO)  mg tablet Take 1 Tab by mouth every six (6) hours as needed. Max Daily Amount: 4 Tabs. fentaNYL (DURAGESIC) 75 mcg/hr 1 Patch by TransDERmal route every seventy-two (72) hours. Max Daily Amount: 1 Patch. Influenza Vaccine Received Flu Vaccine for Current Season (usually Sept-March): Yes           Oxygen needs? [] Room air Oxygen @  []1L    [x]2L    []3L   []4L    []5L   []6L     Use home O2? [] Yes [x] No  Perform O2 challenge test using  smartphrase (.oxygenchallenge)      Last bowel movement Last Bowel Movement Date: 12/11/18  bowel movement      Urinary Catheter             LDAs                                    Readmission Risk Assessment Tool Score High Risk            22       Total Score        3 Has Seen PCP in Last 6 Months (Yes=3, No=0)    3 Patient Length of Stay (>5 days = 3)    9 IP Visits Last 12 Months (1-3=4, 4=9, >4=11)    5 Pt.  Coverage (Medicare=5 , Medicaid, or Self-Pay=4)    2 Charlson Comorbidity Score (Age + Comorbid Conditions)        Criteria that do not apply:    . Living with Significant Other. Assisted Living. LTAC. SNF.  or   Rehab       Expected Length of Stay 2d 16h   Actual Length of Stay [address],

## 2018-12-22 NOTE — PROGRESS NOTES
Oncology Nursing Communication Tool  6:58 PM  12/22/2018     Bedside shift change report given to Maria Luisa Singh RN (incoming nurse) by Everette Sotelo (outgoing nurse) on [facility]. Report included the following information SBAR, Kardex, Intake/Output, MAR and Recent Results. Shift Summary:       Issues for physician to address: Oncology Shift Note   Admission Date 12/12/2018   Admission Diagnosis Sickle cell crisis ([facility] [address]   Code Status Full Code   Consults IP CONSULT TO HOSPITALIST  IP CONSULT TO ONCOLOGY      Cardiac Monitoring [] Yes [] No      Purposeful Hourly Rounding [] Yes    Mukund Score Total Score: 1   Mukund score 3 or > [] Bed Alarm [] Avasys [] 1:1 sitter [] Patient refused (Place signed refusal form in chart)      Pain Managed [] Yes [] No    Key Pain Meds             HYDROcodone-acetaminophen (NORCO)  mg tablet Take 1 Tab by mouth every six (6) hours as needed. Max Daily Amount: 4 Tabs. fentaNYL (DURAGESIC) 75 mcg/hr 1 Patch by TransDERmal route every seventy-two (72) hours. Max Daily Amount: 1 Patch. Influenza Vaccine Received Flu Vaccine for Current Season (usually Sept-March): Yes           Oxygen needs? [] Room air Oxygen @  []1L    []2L    []3L   []4L    []5L   []6L     Use home O2? [] Yes [] No  Perform O2 challenge test using  smartphrase (.oxygenchallenge)      Last bowel movement Last Bowel Movement Date: 12/20/18  bowel movement      Urinary Catheter             LDAs                                    Readmission Risk Assessment Tool Score High Risk            22       Total Score        3 Has Seen PCP in Last 6 Months (Yes=3, No=0)    3 Patient Length of Stay (>5 days = 3)    9 IP Visits Last 12 Months (1-3=4, 4=9, >4=11)    5 Pt. Coverage (Medicare=5 , Medicaid, or Self-Pay=4)    2 Charlson Comorbidity Score (Age + Comorbid Conditions)        Criteria that do not apply:    . Living with Significant Other. Assisted Living. LTAC. SNF.  or Rehab       Expected Length of Stay 2d 16h   Actual Length of Stay [address]

## 2018-12-22 NOTE — PROGRESS NOTES
General Daily Progress Note    Admit Date: 12/12/2018    Subjective:     Patient continues to complain of pain------ does not want transfusion. Current Facility-Administered Medications   Medication Dose Route Frequency    saline peripheral flush soln 10 mL  10 mL InterCATHeter PRN    SALINE PERIPHERAL FLUSH Q8H soln 10 mL  10 mL InterCATHeter Q8H    dextrose 5% - 0.45% NaCl with KCl 20 mEq/L infusion  75 mL/hr IntraVENous CONTINUOUS    diphenhydrAMINE (BENADRYL) injection 25 mg  25 mg IntraVENous Q4H PRN    HYDROmorphone (PF) (DILAUDID) injection 1 mg  1 mg IntraVENous Q3H PRN    citalopram (CELEXA) tablet 10 mg  10 mg Oral QPM    cyclobenzaprine (FLEXERIL) tablet 10 mg  10 mg Oral TID PRN    fentaNYL (DURAGESIC) 75 mcg/hr patch 1 Patch  1 Patch TransDERmal F22B    folic acid (FOLVITE) tablet 1 mg  1 mg Oral DAILY    HYDROcodone-acetaminophen (NORCO)  mg tablet 1 Tab  1 Tab Oral Q6H PRN    pantoprazole (PROTONIX) tablet 40 mg  40 mg Oral DAILY    sodium chloride (NS) flush 5-10 mL  5-10 mL IntraVENous Q8H    sodium chloride (NS) flush 5-10 mL  5-10 mL IntraVENous PRN    acetaminophen (TYLENOL) tablet 650 mg  650 mg Oral Q6H PRN    ondansetron (ZOFRAN) injection 4 mg  4 mg IntraVENous Q6H PRN    docusate sodium (COLACE) capsule 100 mg  100 mg Oral DAILY PRN    enoxaparin (LOVENOX) injection 40 mg  40 mg SubCUTAneous Q24H    polyethylene glycol (MIRALAX) packet 17 g  17 g Oral BID PRN        Review of Systems  A comprehensive review of systems was negative. Objective:     Patient Vitals for the past 24 hrs:   BP Temp Pulse Resp SpO2   12/22/18 1133 103/56 98.7 °F (37.1 °C) 93 16 99 %   12/22/18 0742 116/59 99.1 °F (37.3 °C) 95 16 98 %   12/22/18 0311 95/61 97.9 °F (36.6 °C) 91 16 98 %   12/21/18 2320 105/56 98.6 °F (37 °C) 90 18 100 %   12/21/18 1951 122/60 98.6 °F (37 °C) (!) 102 18 100 %   12/21/18 1530 118/60 98.7 °F (37.1 °C) 88 18 100 %     No intake/output data recorded.   No intake/output data recorded. Physical Exam:   Visit Vitals  /56 (BP 1 Location: Left arm, BP Patient Position: At rest)   Pulse 93   Temp 98.7 °F (37.1 °C)   Resp 16   Ht 5' 11\" (1.803 m)   Wt 169 lb 12.1 oz (77 kg)   SpO2 99%   Breastfeeding? No   BMI 23.68 kg/m²     General appearance: alert, cooperative, no distress, appears stated age  Neck: supple, symmetrical, trachea midline, no adenopathy, thyroid: not enlarged, symmetric, no tenderness/mass/nodules, no carotid bruit and no JVD  Lungs: clear to auscultation bilaterally  Heart: regular rate and rhythm, S1, S2 normal, no murmur, click, rub or gallop  Abdomen: soft, non-tender. Bowel sounds normal. No masses,  no organomegaly  Extremities: extremities normal, atraumatic, no cyanosis or edema        Data Review   Recent Results (from the past 24 hour(s))   RETICULOCYTE COUNT    Collection Time: 12/22/18  5:51 AM   Result Value Ref Range    Reticulocyte count 9.6 (H) 0.7 - 2.1 %    Absolute Retic Cnt. 0.1557 (H) 0.0164 - 0.0776 M/ul           Assessment:     Principal Problem:    Sickle cell crisis ([facility] [address] (5/22/2017)    Active Problems:    Hypertension (10/3/2014)      Depression (10/5/2014)        Plan:     1. Continue treatment of painful crises. Currently nothing else to offer other than continued support. 2.  Hydration continues.

## 2018-12-23 LAB
RETICS # AUTO: 0.18 M/UL (ref 0.02–0.08)
RETICS/RBC NFR AUTO: 9.9 % (ref 0.7–2.1)

## 2018-12-23 PROCEDURE — 36415 COLL VENOUS BLD VENIPUNCTURE: CPT

## 2018-12-23 PROCEDURE — 74011250636 HC RX REV CODE- 250/636: Performed by: INTERNAL MEDICINE

## 2018-12-23 PROCEDURE — 74011250637 HC RX REV CODE- 250/637: Performed by: INTERNAL MEDICINE

## 2018-12-23 PROCEDURE — 74011000250 HC RX REV CODE- 250: Performed by: INTERNAL MEDICINE

## 2018-12-23 PROCEDURE — 85045 AUTOMATED RETICULOCYTE COUNT: CPT

## 2018-12-23 PROCEDURE — 94760 N-INVAS EAR/PLS OXIMETRY 1: CPT

## 2018-12-23 PROCEDURE — 77010033678 HC OXYGEN DAILY

## 2018-12-23 PROCEDURE — 65660000000 HC RM CCU STEPDOWN

## 2018-12-23 RX ADMIN — ONDANSETRON 4 MG: 2 INJECTION INTRAMUSCULAR; INTRAVENOUS at 15:07

## 2018-12-23 RX ADMIN — POLYETHYLENE GLYCOL 3350 17 G: 17 POWDER, FOR SOLUTION ORAL at 09:04

## 2018-12-23 RX ADMIN — Medication 10 ML: at 15:08

## 2018-12-23 RX ADMIN — Medication 10 ML: at 02:45

## 2018-12-23 RX ADMIN — HYDROMORPHONE HYDROCHLORIDE 1 MG: 2 INJECTION, SOLUTION INTRAMUSCULAR; INTRAVENOUS; SUBCUTANEOUS at 02:32

## 2018-12-23 RX ADMIN — Medication 10 ML: at 21:03

## 2018-12-23 RX ADMIN — Medication 10 ML: at 05:40

## 2018-12-23 RX ADMIN — CITALOPRAM HYDROBROMIDE 10 MG: 20 TABLET ORAL at 17:03

## 2018-12-23 RX ADMIN — HYDROMORPHONE HYDROCHLORIDE 1 MG: 2 INJECTION, SOLUTION INTRAMUSCULAR; INTRAVENOUS; SUBCUTANEOUS at 12:04

## 2018-12-23 RX ADMIN — HYDROMORPHONE HYDROCHLORIDE 1 MG: 2 INJECTION, SOLUTION INTRAMUSCULAR; INTRAVENOUS; SUBCUTANEOUS at 21:03

## 2018-12-23 RX ADMIN — HYDROMORPHONE HYDROCHLORIDE 1 MG: 2 INJECTION, SOLUTION INTRAMUSCULAR; INTRAVENOUS; SUBCUTANEOUS at 17:48

## 2018-12-23 RX ADMIN — ENOXAPARIN SODIUM 40 MG: 40 INJECTION SUBCUTANEOUS at 08:53

## 2018-12-23 RX ADMIN — HYDROMORPHONE HYDROCHLORIDE 1 MG: 2 INJECTION, SOLUTION INTRAMUSCULAR; INTRAVENOUS; SUBCUTANEOUS at 05:38

## 2018-12-23 RX ADMIN — CYCLOBENZAPRINE HYDROCHLORIDE 10 MG: 10 TABLET, FILM COATED ORAL at 04:14

## 2018-12-23 RX ADMIN — DIPHENHYDRAMINE HYDROCHLORIDE 25 MG: 50 INJECTION, SOLUTION INTRAMUSCULAR; INTRAVENOUS at 15:07

## 2018-12-23 RX ADMIN — CYCLOBENZAPRINE HYDROCHLORIDE 10 MG: 10 TABLET, FILM COATED ORAL at 15:07

## 2018-12-23 RX ADMIN — DEXTROSE MONOHYDRATE, SODIUM CHLORIDE, AND POTASSIUM CHLORIDE 75 ML/HR: 50; 4.5; 1.49 INJECTION, SOLUTION INTRAVENOUS at 16:18

## 2018-12-23 RX ADMIN — HYDROMORPHONE HYDROCHLORIDE 1 MG: 2 INJECTION, SOLUTION INTRAMUSCULAR; INTRAVENOUS; SUBCUTANEOUS at 15:07

## 2018-12-23 RX ADMIN — DIPHENHYDRAMINE HYDROCHLORIDE 25 MG: 50 INJECTION, SOLUTION INTRAMUSCULAR; INTRAVENOUS at 08:53

## 2018-12-23 RX ADMIN — DIPHENHYDRAMINE HYDROCHLORIDE 25 MG: 50 INJECTION, SOLUTION INTRAMUSCULAR; INTRAVENOUS at 21:02

## 2018-12-23 RX ADMIN — FOLIC ACID 1 MG: 1 TABLET ORAL at 08:52

## 2018-12-23 RX ADMIN — DEXTROSE MONOHYDRATE, SODIUM CHLORIDE, AND POTASSIUM CHLORIDE 75 ML/HR: 50; 4.5; 1.49 INJECTION, SOLUTION INTRAVENOUS at 02:44

## 2018-12-23 RX ADMIN — DIPHENHYDRAMINE HYDROCHLORIDE 25 MG: 50 INJECTION, SOLUTION INTRAMUSCULAR; INTRAVENOUS at 02:32

## 2018-12-23 RX ADMIN — ONDANSETRON 4 MG: 2 INJECTION INTRAMUSCULAR; INTRAVENOUS at 21:03

## 2018-12-23 RX ADMIN — ONDANSETRON 4 MG: 2 INJECTION INTRAMUSCULAR; INTRAVENOUS at 02:32

## 2018-12-23 RX ADMIN — HYDROMORPHONE HYDROCHLORIDE 1 MG: 2 INJECTION, SOLUTION INTRAMUSCULAR; INTRAVENOUS; SUBCUTANEOUS at 08:53

## 2018-12-23 RX ADMIN — ONDANSETRON 4 MG: 2 INJECTION INTRAMUSCULAR; INTRAVENOUS at 08:53

## 2018-12-23 RX ADMIN — PANTOPRAZOLE SODIUM 40 MG: 40 TABLET, DELAYED RELEASE ORAL at 08:52

## 2018-12-23 NOTE — PROGRESS NOTES
General Daily Progress Note    Admit Date: 12/12/2018    Subjective:     Patient continues to complain of pain. Current Facility-Administered Medications   Medication Dose Route Frequency    HYDROmorphone (PF) (DILAUDID) injection 1 mg  1 mg IntraVENous Q3H PRN    saline peripheral flush soln 10 mL  10 mL InterCATHeter PRN    SALINE PERIPHERAL FLUSH Q8H soln 10 mL  10 mL InterCATHeter Q8H    dextrose 5% - 0.45% NaCl with KCl 20 mEq/L infusion  75 mL/hr IntraVENous CONTINUOUS    diphenhydrAMINE (BENADRYL) injection 25 mg  25 mg IntraVENous Q4H PRN    citalopram (CELEXA) tablet 10 mg  10 mg Oral QPM    cyclobenzaprine (FLEXERIL) tablet 10 mg  10 mg Oral TID PRN    fentaNYL (DURAGESIC) 75 mcg/hr patch 1 Patch  1 Patch TransDERmal M60N    folic acid (FOLVITE) tablet 1 mg  1 mg Oral DAILY    HYDROcodone-acetaminophen (NORCO)  mg tablet 1 Tab  1 Tab Oral Q6H PRN    pantoprazole (PROTONIX) tablet 40 mg  40 mg Oral DAILY    sodium chloride (NS) flush 5-10 mL  5-10 mL IntraVENous Q8H    sodium chloride (NS) flush 5-10 mL  5-10 mL IntraVENous PRN    acetaminophen (TYLENOL) tablet 650 mg  650 mg Oral Q6H PRN    ondansetron (ZOFRAN) injection 4 mg  4 mg IntraVENous Q6H PRN    docusate sodium (COLACE) capsule 100 mg  100 mg Oral DAILY PRN    enoxaparin (LOVENOX) injection 40 mg  40 mg SubCUTAneous Q24H    polyethylene glycol (MIRALAX) packet 17 g  17 g Oral BID PRN        Review of Systems  A comprehensive review of systems was negative. Objective:     Patient Vitals for the past 24 hrs:   BP Temp Pulse Resp SpO2   12/23/18 1130 123/64 98.9 °F (37.2 °C) 95 18 100 %   12/23/18 0853 111/68  93     12/23/18 0807 92/41 98.6 °F (37 °C) 90 18 98 %   12/23/18 0308 125/58 99.3 °F (37.4 °C) 98 18 97 %   12/22/18 2343 107/53 99.1 °F (37.3 °C) 93 18 100 %   12/22/18 1939 102/54 98.9 °F (37.2 °C) (!) 102 16 92 %   12/22/18 1505 109/54 99.4 °F (37.4 °C) 100 16 99 %     No intake/output data recorded.   No intake/output data recorded. Physical Exam:   Visit Vitals  /64 (BP 1 Location: Left arm, BP Patient Position: At rest)   Pulse 95   Temp 98.9 °F (37.2 °C)   Resp 18   Ht 5' 11\" (1.803 m)   Wt 169 lb 12.1 oz (77 kg)   SpO2 100%   Breastfeeding? No   BMI 23.68 kg/m²     General appearance: alert, cooperative, no distress, appears stated age  Neck: supple, symmetrical, trachea midline, no adenopathy, thyroid: not enlarged, symmetric, no tenderness/mass/nodules, no carotid bruit and no JVD  Lungs: clear to auscultation bilaterally  Heart: regular rate and rhythm, S1, S2 normal, no murmur, click, rub or gallop  Abdomen: soft, non-tender. Bowel sounds normal. No masses,  no organomegaly  Extremities: extremities normal, atraumatic, no cyanosis or edema        Data Review   Recent Results (from the past 24 hour(s))   RETICULOCYTE COUNT    Collection Time: 12/23/18  2:35 AM   Result Value Ref Range    Reticulocyte count 9.9 (H) 0.7 - 2.1 %    Absolute Retic Cnt. 0.1826 (H) 0.0164 - 0.0776 M/ul           Assessment:     Principal Problem:    Sickle cell crisis ([facility] [address] (5/22/2017)    Active Problems:    Hypertension (10/3/2014)      Depression (10/5/2014)        Plan:     1. Continue treatment of painful crises. Reassess CBC in a.m.. 2.  Encourage patient to mobilize. 3.  Continue hydration.

## 2018-12-23 NOTE — PROGRESS NOTES
Oncology Nursing Communication Tool  6:51 AM  12/23/2018     Bedside shift change report given to [address], RN (incoming nurse) by Vi Coleman (outgoing nurse) on [facility]. Report included the following information SBAR, Kardex, Intake/Output, MAR and Recent Results. Shift Summary: PRN dilaudid x3, PRN benadryl and zofran x2. Still at level 5-6 pain. Issues for physician to address: none         Oncology Shift Note   Admission Date 12/12/2018   Admission Diagnosis Sickle cell crisis ([facility] [address]   Code Status Full Code   Consults IP CONSULT TO HOSPITALIST  IP CONSULT TO ONCOLOGY      Cardiac Monitoring [x] Yes [] No      Purposeful Hourly Rounding [x] Yes    Mukund Score Total Score: 1   Mukund score 3 or > [] Bed Alarm [] Avasys [] 1:1 sitter [] Patient refused (Place signed refusal form in chart)      Pain Managed [] Yes [] No    Key Pain Meds             HYDROcodone-acetaminophen (NORCO)  mg tablet Take 1 Tab by mouth every six (6) hours as needed. Max Daily Amount: 4 Tabs. fentaNYL (DURAGESIC) 75 mcg/hr 1 Patch by TransDERmal route every seventy-two (72) hours. Max Daily Amount: 1 Patch. Influenza Vaccine Received Flu Vaccine for Current Season (usually Sept-March): Yes           Oxygen needs? [] Room air Oxygen @  []1L    [x]2L    []3L   []4L    []5L   []6L     Use home O2? [] Yes [] No  Perform O2 challenge test using  smartphrase (.oxygenchallenge)      Last bowel movement Last Bowel Movement Date: 12/20/18  bowel movement      Urinary Catheter             LDAs                                    Readmission Risk Assessment Tool Score High Risk            22       Total Score        3 Has Seen PCP in Last 6 Months (Yes=3, No=0)    3 Patient Length of Stay (>5 days = 3)    9 IP Visits Last 12 Months (1-3=4, 4=9, >4=11)    5 Pt.  Coverage (Medicare=5 , Medicaid, or Self-Pay=4)    2 Charlson Comorbidity Score (Age + Comorbid Conditions)        Criteria that do not apply: . Living with Significant Other. Assisted Living. LTAC. SNF.  or   Rehab       Expected Length of Stay 2d 16h   Actual Length of Stay [address] [place] [address]

## 2018-12-23 NOTE — PROGRESS NOTES
Bedside shift change report given to Gerald Oneill RN by Maria Luisa Singh RN. Report included the following information SBAR, ED Summary, Intake/Output, MAR, Recent Results and Cardiac Rhythm NSR.

## 2018-12-24 LAB
ANION GAP SERPL CALC-SCNC: 4 MMOL/L (ref 5–15)
BASOPHILS # BLD: 0 K/UL (ref 0–0.1)
BASOPHILS NFR BLD: 0 % (ref 0–1)
BUN SERPL-MCNC: 6 MG/DL (ref 6–20)
BUN/CREAT SERPL: 8 (ref 12–20)
CALCIUM SERPL-MCNC: 8.4 MG/DL (ref 8.5–10.1)
CHLORIDE SERPL-SCNC: 102 MMOL/L (ref 97–108)
CO2 SERPL-SCNC: 31 MMOL/L (ref 21–32)
CREAT SERPL-MCNC: 0.72 MG/DL (ref 0.55–1.02)
DIFFERENTIAL METHOD BLD: ABNORMAL
EOSINOPHIL # BLD: 0.4 K/UL (ref 0–0.4)
EOSINOPHIL NFR BLD: 5 % (ref 0–7)
ERYTHROCYTE [DISTWIDTH] IN BLOOD BY AUTOMATED COUNT: 18.2 % (ref 11.5–14.5)
GLUCOSE SERPL-MCNC: 90 MG/DL (ref 65–100)
HCT VFR BLD AUTO: 17.7 % (ref 35–47)
HGB BLD-MCNC: 5.9 G/DL (ref 11.5–16)
IMM GRANULOCYTES # BLD: 0 K/UL (ref 0–0.04)
IMM GRANULOCYTES NFR BLD AUTO: 0 % (ref 0–0.5)
LYMPHOCYTES # BLD: 4.1 K/UL (ref 0.8–3.5)
LYMPHOCYTES NFR BLD: 47 % (ref 12–49)
MCH RBC QN AUTO: 31.1 PG (ref 26–34)
MCHC RBC AUTO-ENTMCNC: 33.3 G/DL (ref 30–36.5)
MCV RBC AUTO: 93.2 FL (ref 80–99)
MONOCYTES # BLD: 1.6 K/UL (ref 0–1)
MONOCYTES NFR BLD: 18 % (ref 5–13)
NEUTS SEG # BLD: 2.6 K/UL (ref 1.8–8)
NEUTS SEG NFR BLD: 30 % (ref 32–75)
NRBC # BLD: 0.09 K/UL (ref 0–0.01)
NRBC BLD-RTO: 1 PER 100 WBC
PLATELET # BLD AUTO: ABNORMAL K/UL (ref 150–400)
POTASSIUM SERPL-SCNC: 4.3 MMOL/L (ref 3.5–5.1)
RBC # BLD AUTO: 1.9 M/UL (ref 3.8–5.2)
RBC MORPH BLD: ABNORMAL
RBC MORPH BLD: ABNORMAL
RETICS # AUTO: 0.18 M/UL (ref 0.02–0.08)
RETICS/RBC NFR AUTO: 9.8 % (ref 0.7–2.1)
SODIUM SERPL-SCNC: 137 MMOL/L (ref 136–145)
WBC # BLD AUTO: 8.7 K/UL (ref 3.6–11)

## 2018-12-24 PROCEDURE — 36415 COLL VENOUS BLD VENIPUNCTURE: CPT

## 2018-12-24 PROCEDURE — 74011250636 HC RX REV CODE- 250/636: Performed by: INTERNAL MEDICINE

## 2018-12-24 PROCEDURE — 85045 AUTOMATED RETICULOCYTE COUNT: CPT

## 2018-12-24 PROCEDURE — 65660000000 HC RM CCU STEPDOWN

## 2018-12-24 PROCEDURE — 94760 N-INVAS EAR/PLS OXIMETRY 1: CPT

## 2018-12-24 PROCEDURE — 80048 BASIC METABOLIC PNL TOTAL CA: CPT

## 2018-12-24 PROCEDURE — 74011250637 HC RX REV CODE- 250/637: Performed by: INTERNAL MEDICINE

## 2018-12-24 PROCEDURE — 85025 COMPLETE CBC W/AUTO DIFF WBC: CPT

## 2018-12-24 PROCEDURE — 77010033678 HC OXYGEN DAILY

## 2018-12-24 RX ORDER — HYDROMORPHONE HYDROCHLORIDE 2 MG/ML
1 INJECTION, SOLUTION INTRAMUSCULAR; INTRAVENOUS; SUBCUTANEOUS
Status: DISCONTINUED | OUTPATIENT
Start: 2018-12-24 | End: 2018-12-28

## 2018-12-24 RX ADMIN — DIPHENHYDRAMINE HYDROCHLORIDE 25 MG: 50 INJECTION, SOLUTION INTRAMUSCULAR; INTRAVENOUS at 17:29

## 2018-12-24 RX ADMIN — CYCLOBENZAPRINE HYDROCHLORIDE 10 MG: 10 TABLET, FILM COATED ORAL at 21:34

## 2018-12-24 RX ADMIN — ONDANSETRON 4 MG: 2 INJECTION INTRAMUSCULAR; INTRAVENOUS at 17:29

## 2018-12-24 RX ADMIN — HYDROMORPHONE HYDROCHLORIDE 1 MG: 2 INJECTION, SOLUTION INTRAMUSCULAR; INTRAVENOUS; SUBCUTANEOUS at 12:57

## 2018-12-24 RX ADMIN — DIPHENHYDRAMINE HYDROCHLORIDE 25 MG: 50 INJECTION, SOLUTION INTRAMUSCULAR; INTRAVENOUS at 02:19

## 2018-12-24 RX ADMIN — CITALOPRAM HYDROBROMIDE 10 MG: 20 TABLET ORAL at 17:28

## 2018-12-24 RX ADMIN — ONDANSETRON 4 MG: 2 INJECTION INTRAMUSCULAR; INTRAVENOUS at 02:19

## 2018-12-24 RX ADMIN — DIPHENHYDRAMINE HYDROCHLORIDE 25 MG: 50 INJECTION, SOLUTION INTRAMUSCULAR; INTRAVENOUS at 06:00

## 2018-12-24 RX ADMIN — DIPHENHYDRAMINE HYDROCHLORIDE 25 MG: 50 INJECTION, SOLUTION INTRAMUSCULAR; INTRAVENOUS at 10:08

## 2018-12-24 RX ADMIN — HYDROMORPHONE HYDROCHLORIDE 1 MG: 2 INJECTION, SOLUTION INTRAMUSCULAR; INTRAVENOUS; SUBCUTANEOUS at 02:19

## 2018-12-24 RX ADMIN — HYDROMORPHONE HYDROCHLORIDE 1 MG: 2 INJECTION, SOLUTION INTRAMUSCULAR; INTRAVENOUS; SUBCUTANEOUS at 17:30

## 2018-12-24 RX ADMIN — HYDROMORPHONE HYDROCHLORIDE 1 MG: 2 INJECTION, SOLUTION INTRAMUSCULAR; INTRAVENOUS; SUBCUTANEOUS at 21:28

## 2018-12-24 RX ADMIN — HYDROMORPHONE HYDROCHLORIDE 1 MG: 2 INJECTION, SOLUTION INTRAMUSCULAR; INTRAVENOUS; SUBCUTANEOUS at 06:00

## 2018-12-24 RX ADMIN — Medication 10 ML: at 22:25

## 2018-12-24 RX ADMIN — Medication 10 ML: at 09:05

## 2018-12-24 RX ADMIN — HYDROCODONE BITARTRATE AND ACETAMINOPHEN 1 TABLET: 10; 325 TABLET ORAL at 20:49

## 2018-12-24 RX ADMIN — CYCLOBENZAPRINE HYDROCHLORIDE 10 MG: 10 TABLET, FILM COATED ORAL at 09:07

## 2018-12-24 RX ADMIN — DEXTROSE MONOHYDRATE, SODIUM CHLORIDE, AND POTASSIUM CHLORIDE 75 ML/HR: 50; 4.5; 1.49 INJECTION, SOLUTION INTRAVENOUS at 06:04

## 2018-12-24 RX ADMIN — ENOXAPARIN SODIUM 40 MG: 40 INJECTION SUBCUTANEOUS at 09:05

## 2018-12-24 RX ADMIN — FOLIC ACID 1 MG: 1 TABLET ORAL at 09:05

## 2018-12-24 RX ADMIN — Medication 10 ML: at 09:06

## 2018-12-24 RX ADMIN — PANTOPRAZOLE SODIUM 40 MG: 40 TABLET, DELAYED RELEASE ORAL at 09:05

## 2018-12-24 RX ADMIN — ONDANSETRON 4 MG: 2 INJECTION INTRAMUSCULAR; INTRAVENOUS at 09:05

## 2018-12-24 RX ADMIN — Medication 10 ML: at 17:30

## 2018-12-24 RX ADMIN — HYDROMORPHONE HYDROCHLORIDE 1 MG: 2 INJECTION, SOLUTION INTRAMUSCULAR; INTRAVENOUS; SUBCUTANEOUS at 09:05

## 2018-12-24 RX ADMIN — DEXTROSE MONOHYDRATE, SODIUM CHLORIDE, AND POTASSIUM CHLORIDE 75 ML/HR: 50; 4.5; 1.49 INJECTION, SOLUTION INTRAVENOUS at 19:52

## 2018-12-24 NOTE — PROGRESS NOTES
Oncology Nursing Communication Tool  6:46 AM  12/24/2018     Bedside shift change report given to Kimberlyn Camara RN (incoming nurse) by Sammy Diego (outgoing nurse) on [facility]. Report included the following information SBAR, Kardex, Intake/Output, MAR and Recent Results. Shift Summary: no changes overnight, labs drawn. Midline no longer has blood return. PRN dilaudid and benadryl x3. zofran x2. Issues for physician to address: none         Oncology Shift Note   Admission Date 12/12/2018   Admission Diagnosis Sickle cell crisis ([facility] [address]   Code Status Full Code   Consults IP CONSULT TO HOSPITALIST  IP CONSULT TO ONCOLOGY      Cardiac Monitoring [x] Yes [] No      Purposeful Hourly Rounding [x] Yes    Mukund Score Total Score: 1   Mukund score 3 or > [] Bed Alarm [] Avasys [] 1:1 sitter [] Patient refused (Place signed refusal form in chart)      Pain Managed [] Yes [] No    Key Pain Meds             HYDROcodone-acetaminophen (NORCO)  mg tablet Take 1 Tab by mouth every six (6) hours as needed. Max Daily Amount: 4 Tabs. fentaNYL (DURAGESIC) 75 mcg/hr 1 Patch by TransDERmal route every seventy-two (72) hours. Max Daily Amount: 1 Patch. Influenza Vaccine Received Flu Vaccine for Current Season (usually Sept-March): Yes           Oxygen needs? [] Room air Oxygen @  []1L    [x]2L    []3L   []4L    []5L   []6L     Use home O2? [] Yes [] No  Perform O2 challenge test using  smartphrase (.oxygenchallenge)      Last bowel movement Last Bowel Movement Date: 12/20/18  bowel movement      Urinary Catheter             LDAs                                    Readmission Risk Assessment Tool Score High Risk            22       Total Score        3 Has Seen PCP in Last 6 Months (Yes=3, No=0)    3 Patient Length of Stay (>5 days = 3)    9 IP Visits Last 12 Months (1-3=4, 4=9, >4=11)    5 Pt.  Coverage (Medicare=5 , Medicaid, or Self-Pay=4)    2 Charlson Comorbidity Score (Age + Comorbid Conditions)        Criteria that do not apply:    . Living with Significant Other. Assisted Living. LTAC. SNF.  or   Rehab       Expected Length of Stay 2d 16h   [facility]

## 2018-12-24 NOTE — PROGRESS NOTES
Bedside and Verbal shift change report given to Malvin (oncoming nurse) by Remy Colunga RN (offgoing nurse). Report included the following information Procedure Summary, Intake/Output, MAR and Recent Results.

## 2018-12-24 NOTE — PROGRESS NOTES
Nutrition Assessment:    INTERVENTIONS/RECOMMENDATIONS:   Continue current diet  Consider a more aggressive bowel regimen since no BM in 4 days and pt on dilaudid    ASSESSMENT:   Chart reviewed. Flowsheet documentation shows pt continues with a good appetite. Noted for last BM on 12/20, currently has scheduled miralax. Diet Order: Regular  % Eaten:  No data found. Pertinent Medications: [x]Reviewed: D5 2/1NE w/KCl, folic acid, zofran, PPI, miralax,   Pertinent Labs: [x]Reviewed:   Food Allergies: [x]NKFA  []Other   Last BM:  12/20  Edema:      []RUE   []LUE   []RLE   []LLE      Pressure Ulcer:      [] Stage I   [] Stage II   [] Stage III   [] Stage IV      Anthropometrics: Height: 5' 11\" (180.3 cm) Weight: 77 kg (169 lb 12.1 oz)    IBW (%IBW):   ( ) UBW (%UBW):   (  %)    BMI: Body mass index is 23.68 kg/m². This BMI is indicative of:  []Underweight   [x]Normal   []Overweight   [] Obesity   [] Extreme Obesity (BMI>40)  Last Weight Metrics:  Weight Loss Metrics 12/12/2018 11/13/2018 10/29/2018 10/2/2018 9/24/2018 9/8/2018 9/7/2018   Today's Wt 169 lb 12.1 oz 197 lb 1.6 oz 170 lb 10.2 oz 201 lb 12.8 oz 190 lb 3.2 oz - 185 lb   BMI 23.68 kg/m2 27.49 kg/m2 23.8 kg/m2 28.15 kg/m2 26.53 kg/m2 25.8 kg/m2 -       Estimated Nutrition Needs (Based on): 1825 Kcals/day(BMR: 1400 x 1.3) , 60 g(0.8 g/kg) Protein  Carbohydrate:  At Least 130 g/day  Fluids: 1825 mL/day or per primary team    NUTRITION DIAGNOSES:   Problem:  No nutritional diagnosis at this time      Etiology: related to       Signs/Symptoms: as evidenced by      Previous Nutrition Dx:  [] Resolved  [] Unresolved           [] Progressing    NUTRITION INTERVENTIONS:  Meals/Snacks: General/healthful diet                  GOAL:   consume >75% of meals in 5-7 days    NUTRITION MONITORING AND EVALUATION   Behavioral-Environmental Outcomes: Readiness to change     Physical Signs/Symptoms Outcomes: Weight/weight change, Electrolyte and renal profile, GI    Previous Goal Met:   [x] Met              [] Progressing Towards Goal              [] Not Progressing Towards Goal   Previous Recommendations:   [] Implemented          [] Not Implemented          [x] Not Applicable    LEARNING NEEDS (Diet, Food/Nutrient-Drug Interaction):    [x] None Identified   [] Identified and Education Provided/Documented   [] Identified and Pt declined/was not appropriate     Cultural, Christian, OR Ethnic Dietary Needs:    [x] None Identified   [] Identified and Addressed     [x] Interdisciplinary Care Plan Reviewed/Documented    [x] Discharge Planning: General healthy diet   [] Participated in Interdisciplinary Rounds    NUTRITION RISK:    [] High              [] Moderate           [x]  Low  []  Minimal/Uncompromised      Antonio Fernandez RDN  Pager [phone]  Weekend Pager [phone]

## 2018-12-24 NOTE — PROGRESS NOTES
Hematology Oncology Progress Note         Follow up for: SC crisis    Chart notes reviewed since last visit. Case discussed with following: nursing staff. Patient complains of the following: States her pain is better  Additional concerns noted by the staff:     Patient Vitals for the past 24 hrs:   BP Temp Pulse Resp SpO2   12/24/18 0721 108/54 98.6 °F (37 °C) 97 16 99 %   12/24/18 0205 141/64 98.7 °F (37.1 °C) (!) 105 18 98 %   12/23/18 2211 104/48 98.7 °F (37.1 °C) 98 16 98 %   12/23/18 1859 99/47 98.6 °F (37 °C) 99 16 98 %   12/23/18 1532 117/61 99.5 °F (37.5 °C) 97 18 98 %   12/23/18 1130 123/64 98.9 °F (37.2 °C) 95 18 100 %     ROS negative at present for 11 organ systems except as noted. Physical Examination:  Gen NAD  CV reg  Lungs  Clear  Abd benign    Labs:  Recent Results (from the past 24 hour(s))   CBC WITH AUTOMATED DIFF    Collection Time: 12/24/18  2:18 AM   Result Value Ref Range    WBC 8.7 3.6 - 11.0 K/uL    RBC 1.90 (L) 3.80 - 5.20 M/uL    HGB 5.9 (LL) 11.5 - 16.0 g/dL    HCT 17.7 (LL) 35.0 - 47.0 %    MCV 93.2 80.0 - 99.0 FL    MCH 31.1 26.0 - 34.0 PG    MCHC 33.3 30.0 - 36.5 g/dL    RDW 18.2 (H) 11.5 - 14.5 %    PLATELET  540 - 994 K/uL     UNABLE TO REPORT ACCURATE COUNT DUE TO PLATELET AGGREGATION, HOWEVER, PLATELETS APPEAR NORMAL IN NUMBER ON SMEAR. PLEASE RESUBMIT SODIUM CITRATE (BLUE) AND EDTA (LAVENDER) TUBES FOR HEMATOLOGICAL TESTING. NRBC 1.0 (H) 0  WBC    ABSOLUTE NRBC 0.09 (H) 0.00 - 0.01 K/uL    NEUTROPHILS 30 (L) 32 - 75 %    LYMPHOCYTES 47 12 - 49 %    MONOCYTES 18 (H) 5 - 13 %    EOSINOPHILS 5 0 - 7 %    BASOPHILS 0 0 - 1 %    IMMATURE GRANULOCYTES 0 0.0 - 0.5 %    ABS. NEUTROPHILS 2.6 1.8 - 8.0 K/UL    ABS. LYMPHOCYTES 4.1 (H) 0.8 - 3.5 K/UL    ABS. MONOCYTES 1.6 (H) 0.0 - 1.0 K/UL    ABS. EOSINOPHILS 0.4 0.0 - 0.4 K/UL    ABS. BASOPHILS 0.0 0.0 - 0.1 K/UL    ABS. IMM.  GRANS. 0.0 0.00 - 0.04 K/UL    DF AUTOMATED      RBC COMMENTS ANISOCYTOSIS  2+        RBC COMMENTS TARGET CELLS  2+       METABOLIC PANEL, BASIC    Collection Time: 12/24/18  2:18 AM   Result Value Ref Range    Sodium 137 136 - 145 mmol/L    Potassium 4.3 3.5 - 5.1 mmol/L    Chloride 102 97 - 108 mmol/L    CO2 31 21 - 32 mmol/L    Anion gap 4 (L) 5 - 15 mmol/L    Glucose 90 65 - 100 mg/dL    BUN 6 6 - 20 MG/DL    Creatinine 0.72 0.55 - 1.02 MG/DL    BUN/Creatinine ratio 8 (L) 12 - 20      GFR est AA >60 >60 ml/min/1.73m2    GFR est non-AA >60 >60 ml/min/1.73m2    Calcium 8.4 (L) 8.5 - 10.1 MG/DL   RETICULOCYTE COUNT    Collection Time: 12/24/18  2:18 AM   Result Value Ref Range    Reticulocyte count 9.8 (H) 0.7 - 2.1 %    Absolute Retic Cnt. 0.1831 (H) 0.0164 - 0.0776 M/ul       Assessment and Plan:   SC crisis -   - Appears comfortable, states pain improving, not wanting transfusion again today. HBG stable. - Is not on hydrea and refuses. - Continue hydration, current pain regimen  - Recommend transfusion to try and get her pain better quicker but she refuses. Please call with questions tomorrow.   If patient still here, will f/u on Monday

## 2018-12-24 NOTE — PROGRESS NOTES
General Daily Progress Note    Admit Date: 12/12/2018    Subjective:     Patient continues to complain of pain. Current Facility-Administered Medications   Medication Dose Route Frequency    HYDROmorphone (PF) (DILAUDID) injection 1 mg  1 mg IntraVENous Q4H PRN    saline peripheral flush soln 10 mL  10 mL InterCATHeter PRN    SALINE PERIPHERAL FLUSH Q8H soln 10 mL  10 mL InterCATHeter Q8H    dextrose 5% - 0.45% NaCl with KCl 20 mEq/L infusion  75 mL/hr IntraVENous CONTINUOUS    diphenhydrAMINE (BENADRYL) injection 25 mg  25 mg IntraVENous Q4H PRN    citalopram (CELEXA) tablet 10 mg  10 mg Oral QPM    cyclobenzaprine (FLEXERIL) tablet 10 mg  10 mg Oral TID PRN    fentaNYL (DURAGESIC) 75 mcg/hr patch 1 Patch  1 Patch TransDERmal C23D    folic acid (FOLVITE) tablet 1 mg  1 mg Oral DAILY    HYDROcodone-acetaminophen (NORCO)  mg tablet 1 Tab  1 Tab Oral Q6H PRN    pantoprazole (PROTONIX) tablet 40 mg  40 mg Oral DAILY    sodium chloride (NS) flush 5-10 mL  5-10 mL IntraVENous Q8H    sodium chloride (NS) flush 5-10 mL  5-10 mL IntraVENous PRN    acetaminophen (TYLENOL) tablet 650 mg  650 mg Oral Q6H PRN    ondansetron (ZOFRAN) injection 4 mg  4 mg IntraVENous Q6H PRN    docusate sodium (COLACE) capsule 100 mg  100 mg Oral DAILY PRN    enoxaparin (LOVENOX) injection 40 mg  40 mg SubCUTAneous Q24H    polyethylene glycol (MIRALAX) packet 17 g  17 g Oral BID PRN        Review of Systems  A comprehensive review of systems was negative. Objective:     Patient Vitals for the past 24 hrs:   BP Temp Pulse Resp SpO2   12/24/18 0721 108/54 98.6 °F (37 °C) 97 16 99 %   12/24/18 0205 141/64 98.7 °F (37.1 °C) (!) 105 18 98 %   12/23/18 2211 104/48 98.7 °F (37.1 °C) 98 16 98 %   12/23/18 1859 99/47 98.6 °F (37 °C) 99 16 98 %   12/23/18 1532 117/61 99.5 °F (37.5 °C) 97 18 98 %   12/23/18 1130 123/64 98.9 °F (37.2 °C) 95 18 100 %     No intake/output data recorded. No intake/output data recorded.     Physical Exam:   Visit Vitals  /54 (BP 1 Location: Left arm, BP Patient Position: At rest)   Pulse 97   Temp 98.6 °F (37 °C)   Resp 16   Ht 5' 11\" (1.803 m)   Wt 169 lb 12.1 oz (77 kg)   SpO2 99%   Breastfeeding? No   BMI 23.68 kg/m²     General appearance: alert, cooperative, no distress, appears stated age  Neck: supple, symmetrical, trachea midline, no adenopathy, thyroid: not enlarged, symmetric, no tenderness/mass/nodules, no carotid bruit and no JVD  Lungs: clear to auscultation bilaterally  Heart: regular rate and rhythm, S1, S2 normal, no murmur, click, rub or gallop  Abdomen: soft, non-tender. Bowel sounds normal. No masses,  no organomegaly  Extremities: extremities normal, atraumatic, no cyanosis or edema  Neurologic: Grossly normal        Data Review   Recent Results (from the past 24 hour(s))   CBC WITH AUTOMATED DIFF    Collection Time: 12/24/18  2:18 AM   Result Value Ref Range    WBC 8.7 3.6 - 11.0 K/uL    RBC 1.90 (L) 3.80 - 5.20 M/uL    HGB 5.9 (LL) 11.5 - 16.0 g/dL    HCT 17.7 (LL) 35.0 - 47.0 %    MCV 93.2 80.0 - 99.0 FL    MCH 31.1 26.0 - 34.0 PG    MCHC 33.3 30.0 - 36.5 g/dL    RDW 18.2 (H) 11.5 - 14.5 %    PLATELET  912 - 899 K/uL     UNABLE TO REPORT ACCURATE COUNT DUE TO PLATELET AGGREGATION, HOWEVER, PLATELETS APPEAR NORMAL IN NUMBER ON SMEAR. PLEASE RESUBMIT SODIUM CITRATE (BLUE) AND EDTA (LAVENDER) TUBES FOR HEMATOLOGICAL TESTING. NRBC 1.0 (H) 0  WBC    ABSOLUTE NRBC 0.09 (H) 0.00 - 0.01 K/uL    NEUTROPHILS 30 (L) 32 - 75 %    LYMPHOCYTES 47 12 - 49 %    MONOCYTES 18 (H) 5 - 13 %    EOSINOPHILS 5 0 - 7 %    BASOPHILS 0 0 - 1 %    IMMATURE GRANULOCYTES 0 0.0 - 0.5 %    ABS. NEUTROPHILS 2.6 1.8 - 8.0 K/UL    ABS. LYMPHOCYTES 4.1 (H) 0.8 - 3.5 K/UL    ABS. MONOCYTES 1.6 (H) 0.0 - 1.0 K/UL    ABS. EOSINOPHILS 0.4 0.0 - 0.4 K/UL    ABS. BASOPHILS 0.0 0.0 - 0.1 K/UL    ABS. IMM.  GRANS. 0.0 0.00 - 0.04 K/UL    DF AUTOMATED      RBC COMMENTS ANISOCYTOSIS  2+        RBC COMMENTS TARGET CELLS  2+       METABOLIC PANEL, BASIC    Collection Time: 12/24/18  2:18 AM   Result Value Ref Range    Sodium 137 136 - 145 mmol/L    Potassium 4.3 3.5 - 5.1 mmol/L    Chloride 102 97 - 108 mmol/L    CO2 31 21 - 32 mmol/L    Anion gap 4 (L) 5 - 15 mmol/L    Glucose 90 65 - 100 mg/dL    BUN 6 6 - 20 MG/DL    Creatinine 0.72 0.55 - 1.02 MG/DL    BUN/Creatinine ratio 8 (L) 12 - 20      GFR est AA >60 >60 ml/min/1.73m2    GFR est non-AA >60 >60 ml/min/1.73m2    Calcium 8.4 (L) 8.5 - 10.1 MG/DL   RETICULOCYTE COUNT    Collection Time: 12/24/18  2:18 AM   Result Value Ref Range    Reticulocyte count 9.8 (H) 0.7 - 2.1 %    Absolute Retic Cnt. 0.1831 (H) 0.0164 - 0.0776 M/ul           Assessment:     Principal Problem:    [facility] ([facility])    Active Problems:    Hypertension (10/3/2014)      Depression (10/5/2014)        Plan:     1. He continues to complain of pain but states that she is improving gradually. . Declines transfusion. Hemoglobin unchanged. 2.  We will attempt to mobilize.

## 2018-12-25 LAB
RETICS # AUTO: 0.16 M/UL (ref 0.02–0.08)
RETICS/RBC NFR AUTO: 9.2 % (ref 0.7–2.1)

## 2018-12-25 PROCEDURE — 36415 COLL VENOUS BLD VENIPUNCTURE: CPT

## 2018-12-25 PROCEDURE — 74011250636 HC RX REV CODE- 250/636: Performed by: INTERNAL MEDICINE

## 2018-12-25 PROCEDURE — 85045 AUTOMATED RETICULOCYTE COUNT: CPT

## 2018-12-25 PROCEDURE — 74011250637 HC RX REV CODE- 250/637: Performed by: INTERNAL MEDICINE

## 2018-12-25 PROCEDURE — 94760 N-INVAS EAR/PLS OXIMETRY 1: CPT

## 2018-12-25 PROCEDURE — 65660000000 HC RM CCU STEPDOWN

## 2018-12-25 PROCEDURE — 77010033678 HC OXYGEN DAILY

## 2018-12-25 RX ADMIN — HYDROCODONE BITARTRATE AND ACETAMINOPHEN 1 TABLET: 10; 325 TABLET ORAL at 12:32

## 2018-12-25 RX ADMIN — ENOXAPARIN SODIUM 40 MG: 40 INJECTION SUBCUTANEOUS at 10:07

## 2018-12-25 RX ADMIN — ONDANSETRON 4 MG: 2 INJECTION INTRAMUSCULAR; INTRAVENOUS at 23:38

## 2018-12-25 RX ADMIN — HYDROMORPHONE HYDROCHLORIDE 1 MG: 2 INJECTION, SOLUTION INTRAMUSCULAR; INTRAVENOUS; SUBCUTANEOUS at 23:37

## 2018-12-25 RX ADMIN — DIPHENHYDRAMINE HYDROCHLORIDE 25 MG: 50 INJECTION, SOLUTION INTRAMUSCULAR; INTRAVENOUS at 01:42

## 2018-12-25 RX ADMIN — Medication 10 ML: at 17:11

## 2018-12-25 RX ADMIN — FOLIC ACID 1 MG: 1 TABLET ORAL at 10:07

## 2018-12-25 RX ADMIN — PANTOPRAZOLE SODIUM 40 MG: 40 TABLET, DELAYED RELEASE ORAL at 10:06

## 2018-12-25 RX ADMIN — DIPHENHYDRAMINE HYDROCHLORIDE 25 MG: 50 INJECTION, SOLUTION INTRAMUSCULAR; INTRAVENOUS at 05:46

## 2018-12-25 RX ADMIN — DEXTROSE MONOHYDRATE, SODIUM CHLORIDE, AND POTASSIUM CHLORIDE 75 ML/HR: 50; 4.5; 1.49 INJECTION, SOLUTION INTRAVENOUS at 19:59

## 2018-12-25 RX ADMIN — ONDANSETRON 4 MG: 2 INJECTION INTRAMUSCULAR; INTRAVENOUS at 01:42

## 2018-12-25 RX ADMIN — DIPHENHYDRAMINE HYDROCHLORIDE 25 MG: 50 INJECTION, SOLUTION INTRAMUSCULAR; INTRAVENOUS at 14:31

## 2018-12-25 RX ADMIN — CYCLOBENZAPRINE HYDROCHLORIDE 10 MG: 10 TABLET, FILM COATED ORAL at 05:47

## 2018-12-25 RX ADMIN — HYDROMORPHONE HYDROCHLORIDE 1 MG: 2 INJECTION, SOLUTION INTRAMUSCULAR; INTRAVENOUS; SUBCUTANEOUS at 10:09

## 2018-12-25 RX ADMIN — HYDROMORPHONE HYDROCHLORIDE 1 MG: 2 INJECTION, SOLUTION INTRAMUSCULAR; INTRAVENOUS; SUBCUTANEOUS at 01:42

## 2018-12-25 RX ADMIN — HYDROMORPHONE HYDROCHLORIDE 1 MG: 2 INJECTION, SOLUTION INTRAMUSCULAR; INTRAVENOUS; SUBCUTANEOUS at 14:33

## 2018-12-25 RX ADMIN — HYDROMORPHONE HYDROCHLORIDE 1 MG: 2 INJECTION, SOLUTION INTRAMUSCULAR; INTRAVENOUS; SUBCUTANEOUS at 05:46

## 2018-12-25 RX ADMIN — HYDROCODONE BITARTRATE AND ACETAMINOPHEN 1 TABLET: 10; 325 TABLET ORAL at 19:59

## 2018-12-25 RX ADMIN — CITALOPRAM HYDROBROMIDE 10 MG: 20 TABLET ORAL at 17:10

## 2018-12-25 RX ADMIN — DEXTROSE MONOHYDRATE, SODIUM CHLORIDE, AND POTASSIUM CHLORIDE 75 ML/HR: 50; 4.5; 1.49 INJECTION, SOLUTION INTRAVENOUS at 09:21

## 2018-12-25 RX ADMIN — HYDROMORPHONE HYDROCHLORIDE 1 MG: 2 INJECTION, SOLUTION INTRAMUSCULAR; INTRAVENOUS; SUBCUTANEOUS at 18:51

## 2018-12-25 RX ADMIN — ONDANSETRON 4 MG: 2 INJECTION INTRAMUSCULAR; INTRAVENOUS at 14:31

## 2018-12-25 RX ADMIN — DIPHENHYDRAMINE HYDROCHLORIDE 25 MG: 50 INJECTION, SOLUTION INTRAMUSCULAR; INTRAVENOUS at 23:38

## 2018-12-25 NOTE — PROGRESS NOTES
Oncology Nursing Communication Tool  6:59 AM  12/25/2018     Bedside shift change report given to SAMIR Brooks (incoming nurse) by John Paul Britt (outgoing nurse) on Les Simon. Report included the following information SBAR, Kardex, MAR and Accordion. Shift Summary: pain management, am labs drawn and results back (reticulocyte 9.2), pt refuses blood transfusions (12/24/18 Hgb 5.9, Hct 17.7)  is aware   Issues for physician to address: Oncology Shift Note   Admission Date 12/12/2018   Admission Diagnosis Sickle cell crisis ([facility] [address]   Code Status Full Code   Consults IP CONSULT TO HOSPITALIST  IP CONSULT TO ONCOLOGY      Cardiac Monitoring [] Yes [] No      Purposeful Hourly Rounding [] Yes    Mukund Score Total Score: 1   Mukund score 3 or > [] Bed Alarm [] Avasys [] 1:1 sitter [] Patient refused (Place signed refusal form in chart)      Pain Managed [] Yes [] No    Key Pain Meds             HYDROcodone-acetaminophen (NORCO)  mg tablet Take 1 Tab by mouth every six (6) hours as needed. Max Daily Amount: 4 Tabs. fentaNYL (DURAGESIC) 75 mcg/hr 1 Patch by TransDERmal route every seventy-two (72) hours. Max Daily Amount: 1 Patch. Influenza Vaccine Received Flu Vaccine for Current Season (usually Sept-March): Yes           Oxygen needs? [] Room air Oxygen @  []1L    []2L    []3L   []4L    []5L   []6L     Use home O2? [] Yes [] No  Perform O2 challenge test using  smartphrase (.oxygenchallenge)      Last bowel movement Last Bowel Movement Date: 12/20/18  bowel movement      Urinary Catheter             LDAs                                    Readmission Risk Assessment Tool Score High Risk            22       Total Score        3 Has Seen PCP in Last 6 Months (Yes=3, No=0)    3 Patient Length of Stay (>5 days = 3)    9 IP Visits Last 12 Months (1-3=4, 4=9, >4=11)    5 Pt.  Coverage (Medicare=5 , Medicaid, or Self-Pay=4)    2 Charlson Comorbidity Score (Age + Comorbid Conditions) Criteria that do not apply:    . Living with Significant Other. Assisted Living. LTAC. SNF.  or   Rehab       Expected Length of Stay 2d 16h   Actual Length of Stay [address] [facility]

## 2018-12-25 NOTE — PROGRESS NOTES
Oncology Nursing Communication Tool  7:54 PM  12/24/2018     Bedside shift change report given to Kumar Hein RN (incoming nurse) by Sophia Castellanos (outgoing nurse) on Stiven Dys. Report included the following information SBAR, Kardex, Intake/Output, MAR and Recent Results. Shift Summary:       Issues for physician to address: Oncology Shift Note   Admission Date 12/12/2018   Admission Diagnosis Sickle cell crisis ([facility] [address]   Code Status Full Code   Consults IP CONSULT TO HOSPITALIST  IP CONSULT TO ONCOLOGY      Cardiac Monitoring [] Yes [] No      Purposeful Hourly Rounding [] Yes    Mukund Score Total Score: 1   Mukund score 3 or > [] Bed Alarm [] Avasys [] 1:1 sitter [] Patient refused (Place signed refusal form in chart)      Pain Managed [] Yes [] No    Key Pain Meds             HYDROcodone-acetaminophen (NORCO)  mg tablet Take 1 Tab by mouth every six (6) hours as needed. Max Daily Amount: 4 Tabs. fentaNYL (DURAGESIC) 75 mcg/hr 1 Patch by TransDERmal route every seventy-two (72) hours. Max Daily Amount: 1 Patch. Influenza Vaccine Received Flu Vaccine for Current Season (usually Sept-March): Yes           Oxygen needs? [] Room air Oxygen @  []1L    []2L    []3L   []4L    []5L   []6L     Use home O2? [] Yes [] No  Perform O2 challenge test using  smartphrase (.oxygenchallenge)      Last bowel movement Last Bowel Movement Date: 12/20/18  bowel movement      Urinary Catheter             LDAs                                    Readmission Risk Assessment Tool Score High Risk            22       Total Score        3 Has Seen PCP in Last 6 Months (Yes=3, No=0)    3 Patient Length of Stay (>5 days = 3)    9 IP Visits Last 12 Months (1-3=4, 4=9, >4=11)    5 Pt. Coverage (Medicare=5 , Medicaid, or Self-Pay=4)    2 Charlson Comorbidity Score (Age + Comorbid Conditions)        Criteria that do not apply:    . Living with Significant Other. Assisted Living. LTAC. SNF.  or Rehab       Expected Length of Stay 2d 16h   Actual Length of Stay [place] [address]

## 2018-12-25 NOTE — PROGRESS NOTES
General Daily Progress Note    Admit Date: 12/12/2018    Subjective:     Patient conts to c/o pain. Current Facility-Administered Medications   Medication Dose Route Frequency    HYDROmorphone (PF) (DILAUDID) injection 1 mg  1 mg IntraVENous Q4H PRN    saline peripheral flush soln 10 mL  10 mL InterCATHeter PRN    SALINE PERIPHERAL FLUSH Q8H soln 10 mL  10 mL InterCATHeter Q8H    dextrose 5% - 0.45% NaCl with KCl 20 mEq/L infusion  75 mL/hr IntraVENous CONTINUOUS    diphenhydrAMINE (BENADRYL) injection 25 mg  25 mg IntraVENous Q4H PRN    citalopram (CELEXA) tablet 10 mg  10 mg Oral QPM    cyclobenzaprine (FLEXERIL) tablet 10 mg  10 mg Oral TID PRN    fentaNYL (DURAGESIC) 75 mcg/hr patch 1 Patch  1 Patch TransDERmal [id number]    folic acid (FOLVITE) tablet 1 mg  1 mg Oral DAILY    HYDROcodone-acetaminophen (NORCO)  mg tablet 1 Tab  1 Tab Oral Q6H PRN    pantoprazole (PROTONIX) tablet 40 mg  40 mg Oral DAILY    acetaminophen (TYLENOL) tablet 650 mg  650 mg Oral Q6H PRN    ondansetron (ZOFRAN) injection 4 mg  4 mg IntraVENous Q6H PRN    docusate sodium (COLACE) capsule 100 mg  100 mg Oral DAILY PRN    enoxaparin (LOVENOX) injection 40 mg  40 mg SubCUTAneous Q24H    polyethylene glycol (MIRALAX) packet 17 g  17 g Oral BID PRN        Review of Systems  A comprehensive review of systems was negative. Objective:     Patient Vitals for the past 24 hrs:   BP Temp Pulse Resp SpO2   12/25/18 0730 105/62 98.2 °F (36.8 °C) 86 16 100 %   12/25/18 0314 106/64 97.8 °F (36.6 °C) 88 14 94 %   12/24/18 2330 104/67 99.2 °F (37.3 °C) 90 16 92 %   12/24/18 1953 118/70 99.4 °F (37.4 °C) 92 16 95 %   12/24/18 1516 117/57 99.1 °F (37.3 °C) 96 16 99 %     No intake/output data recorded. No intake/output data recorded.     Physical Exam:   Visit Vitals  /62 (BP 1 Location: Left arm, BP Patient Position: At rest)   Pulse 86   Temp 98.2 °F (36.8 °C)   Resp 16   Ht 5' 11\" (1.803 m)   Wt 169 lb 12.1 oz (77 kg)   SpO2 100%   Breastfeeding? No   BMI 23.68 kg/m²     General appearance: alert, cooperative, no distress, appears stated age  Neck: supple, symmetrical, trachea midline, no adenopathy, thyroid: not enlarged, symmetric, no tenderness/mass/nodules, no carotid bruit and no JVD  Lungs: clear to auscultation bilaterally  Heart: regular rate and rhythm, S1, S2 normal, no murmur, click, rub or gallop  Abdomen: soft, non-tender. Bowel sounds normal. No masses,  no organomegaly  Extremities: extremities normal, atraumatic, no cyanosis or edema        Data Review   Recent Results (from the past 24 hour(s))   RETICULOCYTE COUNT    Collection Time: 12/25/18  3:35 AM   Result Value Ref Range    Reticulocyte count 9.2 (H) 0.7 - 2.1 %    Absolute Retic Cnt. 0.1614 (H) 0.0164 - 0.0776 M/ul           Assessment:     Principal Problem:    Sickle cell crisis ([facility] [address] (5/22/2017)    Active Problems:    Hypertension (10/3/2014)      Depression (10/5/2014)        Plan:     1. To have pain in right hip and shoulder. No change in treatment regimen for now.

## 2018-12-26 LAB
BASOPHILS # BLD: 0.1 K/UL (ref 0–0.1)
BASOPHILS NFR BLD: 1 % (ref 0–1)
BLASTS NFR BLD MANUAL: 0 %
DIFFERENTIAL METHOD BLD: ABNORMAL
EOSINOPHIL # BLD: 0.8 K/UL (ref 0–0.4)
EOSINOPHIL NFR BLD: 9 % (ref 0–7)
ERYTHROCYTE [DISTWIDTH] IN BLOOD BY AUTOMATED COUNT: 18.3 % (ref 11.5–14.5)
HCT VFR BLD AUTO: 15.8 % (ref 35–47)
HGB BLD-MCNC: 5.3 G/DL (ref 11.5–16)
IMM GRANULOCYTES # BLD: 0 K/UL
IMM GRANULOCYTES NFR BLD AUTO: 0 %
LYMPHOCYTES # BLD: 4 K/UL (ref 0.8–3.5)
LYMPHOCYTES NFR BLD: 49 % (ref 12–49)
MCH RBC QN AUTO: 31.5 PG (ref 26–34)
MCHC RBC AUTO-ENTMCNC: 33.5 G/DL (ref 30–36.5)
MCV RBC AUTO: 94 FL (ref 80–99)
METAMYELOCYTES NFR BLD MANUAL: 0 %
MONOCYTES # BLD: 0.6 K/UL (ref 0–1)
MONOCYTES NFR BLD: 7 % (ref 5–13)
MYELOCYTES NFR BLD MANUAL: 0 %
NEUTS BAND NFR BLD MANUAL: 1 % (ref 0–6)
NEUTS SEG # BLD: 2.9 K/UL (ref 1.8–8)
NEUTS SEG NFR BLD: 33 % (ref 32–75)
NRBC # BLD: 0.1 K/UL (ref 0–0.01)
NRBC BLD-RTO: 1.2 PER 100 WBC
OTHER CELLS NFR BLD MANUAL: 0 %
PLATELET # BLD AUTO: 183 K/UL (ref 150–400)
PMV BLD AUTO: 11.2 FL (ref 8.9–12.9)
PROMYELOCYTES NFR BLD MANUAL: 0 %
RBC # BLD AUTO: 1.68 M/UL (ref 3.8–5.2)
RBC MORPH BLD: ABNORMAL
RETICS # AUTO: 0.17 M/UL (ref 0.02–0.08)
RETICS/RBC NFR AUTO: 9.8 % (ref 0.7–2.1)
WBC # BLD AUTO: 8.4 K/UL (ref 3.6–11)

## 2018-12-26 PROCEDURE — 65660000000 HC RM CCU STEPDOWN

## 2018-12-26 PROCEDURE — 74011250637 HC RX REV CODE- 250/637: Performed by: INTERNAL MEDICINE

## 2018-12-26 PROCEDURE — 74011250636 HC RX REV CODE- 250/636: Performed by: INTERNAL MEDICINE

## 2018-12-26 PROCEDURE — 85045 AUTOMATED RETICULOCYTE COUNT: CPT

## 2018-12-26 PROCEDURE — 74011000250 HC RX REV CODE- 250: Performed by: INTERNAL MEDICINE

## 2018-12-26 PROCEDURE — 36415 COLL VENOUS BLD VENIPUNCTURE: CPT

## 2018-12-26 PROCEDURE — 77010033678 HC OXYGEN DAILY

## 2018-12-26 PROCEDURE — 85027 COMPLETE CBC AUTOMATED: CPT

## 2018-12-26 PROCEDURE — 94760 N-INVAS EAR/PLS OXIMETRY 1: CPT

## 2018-12-26 RX ADMIN — HYDROMORPHONE HYDROCHLORIDE 1 MG: 2 INJECTION, SOLUTION INTRAMUSCULAR; INTRAVENOUS; SUBCUTANEOUS at 07:36

## 2018-12-26 RX ADMIN — DEXTROSE MONOHYDRATE, SODIUM CHLORIDE, AND POTASSIUM CHLORIDE 75 ML/HR: 50; 4.5; 1.49 INJECTION, SOLUTION INTRAVENOUS at 20:16

## 2018-12-26 RX ADMIN — DIPHENHYDRAMINE HYDROCHLORIDE 25 MG: 50 INJECTION, SOLUTION INTRAMUSCULAR; INTRAVENOUS at 11:41

## 2018-12-26 RX ADMIN — CITALOPRAM HYDROBROMIDE 10 MG: 20 TABLET ORAL at 18:28

## 2018-12-26 RX ADMIN — Medication 10 ML: at 11:42

## 2018-12-26 RX ADMIN — HYDROCODONE BITARTRATE AND ACETAMINOPHEN 1 TABLET: 10; 325 TABLET ORAL at 18:27

## 2018-12-26 RX ADMIN — ONDANSETRON 4 MG: 2 INJECTION INTRAMUSCULAR; INTRAVENOUS at 07:36

## 2018-12-26 RX ADMIN — Medication 10 ML: at 13:34

## 2018-12-26 RX ADMIN — Medication 10 ML: at 06:00

## 2018-12-26 RX ADMIN — ONDANSETRON 4 MG: 2 INJECTION INTRAMUSCULAR; INTRAVENOUS at 23:12

## 2018-12-26 RX ADMIN — Medication 10 ML: at 16:04

## 2018-12-26 RX ADMIN — Medication 10 ML: at 03:30

## 2018-12-26 RX ADMIN — Medication 10 ML: at 21:47

## 2018-12-26 RX ADMIN — DIPHENHYDRAMINE HYDROCHLORIDE 25 MG: 50 INJECTION, SOLUTION INTRAMUSCULAR; INTRAVENOUS at 07:37

## 2018-12-26 RX ADMIN — POLYETHYLENE GLYCOL 3350 17 G: 17 POWDER, FOR SOLUTION ORAL at 03:31

## 2018-12-26 RX ADMIN — HYDROMORPHONE HYDROCHLORIDE 1 MG: 2 INJECTION, SOLUTION INTRAMUSCULAR; INTRAVENOUS; SUBCUTANEOUS at 23:12

## 2018-12-26 RX ADMIN — DIPHENHYDRAMINE HYDROCHLORIDE 25 MG: 50 INJECTION, SOLUTION INTRAMUSCULAR; INTRAVENOUS at 20:12

## 2018-12-26 RX ADMIN — FOLIC ACID 1 MG: 1 TABLET ORAL at 09:14

## 2018-12-26 RX ADMIN — ENOXAPARIN SODIUM 40 MG: 40 INJECTION SUBCUTANEOUS at 09:13

## 2018-12-26 RX ADMIN — DIPHENHYDRAMINE HYDROCHLORIDE 25 MG: 50 INJECTION, SOLUTION INTRAMUSCULAR; INTRAVENOUS at 15:58

## 2018-12-26 RX ADMIN — PANTOPRAZOLE SODIUM 40 MG: 40 TABLET, DELAYED RELEASE ORAL at 09:13

## 2018-12-26 RX ADMIN — CYCLOBENZAPRINE HYDROCHLORIDE 10 MG: 10 TABLET, FILM COATED ORAL at 03:31

## 2018-12-26 RX ADMIN — DIPHENHYDRAMINE HYDROCHLORIDE 25 MG: 50 INJECTION, SOLUTION INTRAMUSCULAR; INTRAVENOUS at 03:32

## 2018-12-26 RX ADMIN — HYDROMORPHONE HYDROCHLORIDE 1 MG: 2 INJECTION, SOLUTION INTRAMUSCULAR; INTRAVENOUS; SUBCUTANEOUS at 20:12

## 2018-12-26 RX ADMIN — DOCUSATE SODIUM 100 MG: 100 CAPSULE, LIQUID FILLED ORAL at 07:40

## 2018-12-26 RX ADMIN — DEXTROSE MONOHYDRATE, SODIUM CHLORIDE, AND POTASSIUM CHLORIDE 75 ML/HR: 50; 4.5; 1.49 INJECTION, SOLUTION INTRAVENOUS at 09:35

## 2018-12-26 RX ADMIN — CYCLOBENZAPRINE HYDROCHLORIDE 10 MG: 10 TABLET, FILM COATED ORAL at 21:46

## 2018-12-26 RX ADMIN — HYDROMORPHONE HYDROCHLORIDE 1 MG: 2 INJECTION, SOLUTION INTRAMUSCULAR; INTRAVENOUS; SUBCUTANEOUS at 11:37

## 2018-12-26 RX ADMIN — ONDANSETRON 4 MG: 2 INJECTION INTRAMUSCULAR; INTRAVENOUS at 15:58

## 2018-12-26 RX ADMIN — HYDROMORPHONE HYDROCHLORIDE 1 MG: 2 INJECTION, SOLUTION INTRAMUSCULAR; INTRAVENOUS; SUBCUTANEOUS at 03:32

## 2018-12-26 NOTE — PROGRESS NOTES
Oncology Nursing Communication Tool  7:04 AM  12/26/2018     Bedside shift change report given to SAMIR Mcwilliams (incoming nurse) by Nilson Cano (outgoing nurse) on Mary Anne Sampson. Report included the following information SBAR, Kardex, MAR and Accordion. Shift Summary: slept more this shift and required less pain medication, but still frequent     Issues for physician to address: Oncology Shift Note   Admission Date 12/12/2018   Admission Diagnosis Sickle cell crisis ([facility] [address]   Code Status Full Code   Consults IP CONSULT TO HOSPITALIST  IP CONSULT TO ONCOLOGY      Cardiac Monitoring [] Yes [] No      Purposeful Hourly Rounding [] Yes    Mukund Score Total Score: 1   Mukund score 3 or > [] Bed Alarm [] Avasys [] 1:1 sitter [] Patient refused (Place signed refusal form in chart)      Pain Managed [] Yes [] No    Key Pain Meds             HYDROcodone-acetaminophen (NORCO)  mg tablet Take 1 Tab by mouth every six (6) hours as needed. Max Daily Amount: 4 Tabs. fentaNYL (DURAGESIC) 75 mcg/hr 1 Patch by TransDERmal route every seventy-two (72) hours. Max Daily Amount: 1 Patch. Influenza Vaccine Received Flu Vaccine for Current Season (usually Sept-March): Yes           Oxygen needs? [] Room air Oxygen @  []1L    []2L    []3L   []4L    []5L   []6L     Use home O2? [] Yes [] No  Perform O2 challenge test using  smartphrase (.oxygenchallenge)      Last bowel movement Last Bowel Movement Date: 12/20/18  bowel movement      Urinary Catheter             LDAs                                    Readmission Risk Assessment Tool Score High Risk            22       Total Score        3 Has Seen PCP in Last 6 Months (Yes=3, No=0)    3 Patient Length of Stay (>5 days = 3)    9 IP Visits Last 12 Months (1-3=4, 4=9, >4=11)    5 Pt. Coverage (Medicare=5 , Medicaid, or Self-Pay=4)    2 Charlson Comorbidity Score (Age + Comorbid Conditions)        Criteria that do not apply:    .  Living with Significant Other. Assisted Living. LTAC. SNF.  or   Rehab       Expected Length of Stay 2d 16h   Actual Length of Stay [address]

## 2018-12-26 NOTE — PROGRESS NOTES
General Daily Progress Note    Admit Date: 12/12/2018    Subjective:     Patient continues with pain may be slightly worse. Current Facility-Administered Medications   Medication Dose Route Frequency    HYDROmorphone (PF) (DILAUDID) injection 1 mg  1 mg IntraVENous Q4H PRN    saline peripheral flush soln 10 mL  10 mL InterCATHeter PRN    SALINE PERIPHERAL FLUSH Q8H soln 10 mL  10 mL InterCATHeter Q8H    dextrose 5% - 0.45% NaCl with KCl 20 mEq/L infusion  75 mL/hr IntraVENous CONTINUOUS    diphenhydrAMINE (BENADRYL) injection 25 mg  25 mg IntraVENous Q4H PRN    citalopram (CELEXA) tablet 10 mg  10 mg Oral QPM    cyclobenzaprine (FLEXERIL) tablet 10 mg  10 mg Oral TID PRN    fentaNYL (DURAGESIC) 75 mcg/hr patch 1 Patch  1 Patch TransDERmal N03P    folic acid (FOLVITE) tablet 1 mg  1 mg Oral DAILY    HYDROcodone-acetaminophen (NORCO)  mg tablet 1 Tab  1 Tab Oral Q6H PRN    pantoprazole (PROTONIX) tablet 40 mg  40 mg Oral DAILY    acetaminophen (TYLENOL) tablet 650 mg  650 mg Oral Q6H PRN    ondansetron (ZOFRAN) injection 4 mg  4 mg IntraVENous Q6H PRN    docusate sodium (COLACE) capsule 100 mg  100 mg Oral DAILY PRN    enoxaparin (LOVENOX) injection 40 mg  40 mg SubCUTAneous Q24H    polyethylene glycol (MIRALAX) packet 17 g  17 g Oral BID PRN        Review of Systems  A comprehensive review of systems was negative. Objective:     Patient Vitals for the past 24 hrs:   BP Temp Pulse Resp SpO2   12/26/18 0809 117/69 98.6 °F (37 °C) 92 16 99 %   12/26/18 0722 105/63 98.5 °F (36.9 °C) 91 16 100 %   12/26/18 0321 121/76 98.8 °F (37.1 °C) 93 16 98 %   12/25/18 2354 118/70 99.5 °F (37.5 °C) 98 15 100 %   12/25/18 2102 115/66 99.1 °F (37.3 °C) 94 16 99 %   12/25/18 1615 106/55 98.3 °F (36.8 °C) 95 16 99 %   12/25/18 1130 108/56 98.1 °F (36.7 °C) 89 16 100 %     No intake/output data recorded.   12/24 1901 - 12/26 0700  In: 360 [P.O.:360]  Out: -     Physical Exam:   Visit Vitals  /69 (BP 1 Location: Left arm, BP Patient Position: At rest)   Pulse 92   Temp 98.6 °F (37 °C)   Resp 16   Ht 5' 11\" (1.803 m)   Wt 169 lb 12.1 oz (77 kg)   SpO2 99%   Breastfeeding? No   BMI 23.68 kg/m²     General appearance: alert, cooperative, no distress, appears stated age  Neck: supple, symmetrical, trachea midline, no adenopathy, thyroid: not enlarged, symmetric, no tenderness/mass/nodules, no carotid bruit and no JVD  Lungs: clear to auscultation bilaterally  Heart: regular rate and rhythm, S1, S2 normal, no murmur, click, rub or gallop  Abdomen: soft, non-tender. Bowel sounds normal. No masses,  no organomegaly  Extremities: extremities normal, atraumatic, no cyanosis or edema        Data Review   Recent Results (from the past 24 hour(s))   RETICULOCYTE COUNT    Collection Time: 12/26/18  3:14 AM   Result Value Ref Range    Reticulocyte count 9.8 (H) 0.7 - 2.1 %    Absolute Retic Cnt. 0.1674 (H) 0.0164 - 0.0776 M/ul   CBC WITH MANUAL DIFF    Collection Time: 12/26/18  3:14 AM   Result Value Ref Range    WBC 8.4 3.6 - 11.0 K/uL    RBC 1.68 (L) 3.80 - 5.20 M/uL    HGB 5.3 (LL) 11.5 - 16.0 g/dL    HCT 15.8 (LL) 35.0 - 47.0 %    MCV 94.0 80.0 - 99.0 FL    MCH 31.5 26.0 - 34.0 PG    MCHC 33.5 30.0 - 36.5 g/dL    RDW 18.3 (H) 11.5 - 14.5 %    PLATELET 562 539 - 580 K/uL    MPV 11.2 8.9 - 12.9 FL    NRBC 1.2 (H) 0  WBC    ABSOLUTE NRBC 0.10 (H) 0.00 - 0.01 K/uL    NEUTROPHILS 33 32 - 75 %    BAND NEUTROPHILS 1 0 - 6 %    LYMPHOCYTES 49 12 - 49 %    MONOCYTES 7 5 - 13 %    EOSINOPHILS 9 (H) 0 - 7 %    BASOPHILS 1 0 - 1 %    METAMYELOCYTES 0 0 %    MYELOCYTES 0 0 %    PROMYELOCYTES 0 0 %    BLASTS 0 0 %    OTHER CELL 0 0      IMMATURE GRANULOCYTES 0 %    ABS. NEUTROPHILS 2.9 1.8 - 8.0 K/UL    ABS. LYMPHOCYTES 4.0 (H) 0.8 - 3.5 K/UL    ABS. MONOCYTES 0.6 0.0 - 1.0 K/UL    ABS. EOSINOPHILS 0.8 (H) 0.0 - 0.4 K/UL    ABS. BASOPHILS 0.1 0.0 - 0.1 K/UL    ABS. IMM.  GRANS. 0.0 K/UL    DF MANUAL      RBC COMMENTS ANISOCYTOSIS  2+        RBC COMMENTS TARGET CELLS  2+        RBC COMMENTS SICKLE CELLS             Assessment:     Principal Problem:    Sickle cell crisis ([address] (5/22/2017)    Active Problems:    Hypertension (10/3/2014)      Depression (10/5/2014)        Plan:     1. Hemoglobin 5.3. Patient is now considering transfusion which would probably reduce intensity and duration of painful crises. 2.  Hydration continues.

## 2018-12-26 NOTE — PROGRESS NOTES
Oncology Nursing Communication Tool  7:33 PM  12/25/2018     Bedside shift change report given to [facility], RN (incoming nurse) by Brenden Gant RN (outgoing nurse) on Jc Jordan. Report included the following information SBAR. Shift Summary:       Issues for physician to address: Oncology Shift Note   Admission Date 12/12/2018   Admission Diagnosis Sickle cell crisis ([address]   Code Status Full Code   Consults IP CONSULT TO HOSPITALIST  IP CONSULT TO ONCOLOGY      Cardiac Monitoring [] Yes [] No      Purposeful Hourly Rounding [x] Yes    Mukund Score Total Score: 1   Mukund score 3 or > [] Bed Alarm [] Avasys [] 1:1 sitter [] Patient refused (Place signed refusal form in chart)      Pain Managed [x] Yes [] No    Key Pain Meds             HYDROcodone-acetaminophen (NORCO)  mg tablet Take 1 Tab by mouth every six (6) hours as needed. Max Daily Amount: 4 Tabs. fentaNYL (DURAGESIC) 75 mcg/hr 1 Patch by TransDERmal route every seventy-two (72) hours. Max Daily Amount: 1 Patch. Influenza Vaccine Received Flu Vaccine for Current Season (usually Sept-March): Yes           Oxygen needs? [] Room air Oxygen @  []1L    []2L    []3L   []4L    []5L   []6L     Use home O2? [] Yes [] No  Perform O2 challenge test using  smartphrase (.oxygenchallenge)      Last bowel movement Last Bowel Movement Date: 12/20/18  bowel movement      Urinary Catheter             LDAs                                    Readmission Risk Assessment Tool Score High Risk            22       Total Score        3 Has Seen PCP in Last 6 Months (Yes=3, No=0)    3 Patient Length of Stay (>5 days = 3)    9 IP Visits Last 12 Months (1-3=4, 4=9, >4=11)    5 Pt. Coverage (Medicare=5 , Medicaid, or Self-Pay=4)    2 Charlson Comorbidity Score (Age + Comorbid Conditions)        Criteria that do not apply:    . Living with Significant Other. Assisted Living. LTAC. SNF.  or   Rehab       Expected Length of Stay 2d 16h Actual Length of Stay [address]

## 2018-12-26 NOTE — PROGRESS NOTES
Hematology Oncology Progress Note         Follow up for: SC crisis    Chart notes reviewed since last visit. Case discussed with following: nursing staff. Patient complains of the following: States her pain is under good control  Additional concerns noted by the staff:     Patient Vitals for the past 24 hrs:   BP Temp Pulse Resp SpO2   12/26/18 0809 117/69 98.6 °F (37 °C) 92 16 99 %   12/26/18 0722 105/63 98.5 °F (36.9 °C) 91 16 100 %   12/26/18 0321 121/76 98.8 °F (37.1 °C) 93 16 98 %   12/25/18 2354 118/70 99.5 °F (37.5 °C) 98 15 100 %   12/25/18 2102 115/66 99.1 °F (37.3 °C) 94 16 99 %   12/25/18 1615 106/55 98.3 °F (36.8 °C) 95 16 99 %   12/25/18 1130 108/56 98.1 °F (36.7 °C) 89 16 100 %     ROS negative at present for 11 organ systems except as noted. Physical Examination:  Gen NAD  CV reg  Lungs  Clear  Abd benign    Labs:  Recent Results (from the past 24 hour(s))   RETICULOCYTE COUNT    Collection Time: 12/26/18  3:14 AM   Result Value Ref Range    Reticulocyte count 9.8 (H) 0.7 - 2.1 %    Absolute Retic Cnt. 0.1674 (H) 0.0164 - 0.0776 M/ul   CBC WITH MANUAL DIFF    Collection Time: 12/26/18  3:14 AM   Result Value Ref Range    WBC 8.4 3.6 - 11.0 K/uL    RBC 1.68 (L) 3.80 - 5.20 M/uL    HGB 5.3 (LL) 11.5 - 16.0 g/dL    HCT 15.8 (LL) 35.0 - 47.0 %    MCV 94.0 80.0 - 99.0 FL    MCH 31.5 26.0 - 34.0 PG    MCHC 33.5 30.0 - 36.5 g/dL    RDW 18.3 (H) 11.5 - 14.5 %    PLATELET 837 245 - 428 K/uL    MPV 11.2 8.9 - 12.9 FL    NRBC 1.2 (H) 0  WBC    ABSOLUTE NRBC 0.10 (H) 0.00 - 0.01 K/uL    NEUTROPHILS 33 32 - 75 %    BAND NEUTROPHILS 1 0 - 6 %    LYMPHOCYTES 49 12 - 49 %    MONOCYTES 7 5 - 13 %    EOSINOPHILS 9 (H) 0 - 7 %    BASOPHILS 1 0 - 1 %    METAMYELOCYTES 0 0 %    MYELOCYTES 0 0 %    PROMYELOCYTES 0 0 %    BLASTS 0 0 %    OTHER CELL 0 0      IMMATURE GRANULOCYTES 0 %    ABS. NEUTROPHILS 2.9 1.8 - 8.0 K/UL    ABS. LYMPHOCYTES 4.0 (H) 0.8 - 3.5 K/UL    ABS. MONOCYTES 0.6 0.0 - 1.0 K/UL    ABS. EOSINOPHILS 0.8 (H) 0.0 - 0.4 K/UL    ABS. BASOPHILS 0.1 0.0 - 0.1 K/UL    ABS. IMM. GRANS. 0.0 K/UL    DF MANUAL      RBC COMMENTS ANISOCYTOSIS  2+        RBC COMMENTS TARGET CELLS  2+        RBC COMMENTS SICKLE CELLS         Assessment and Plan:   SC crisis -   - Appears comfortable, states pain improving, not wanting transfusion again today. HBG stable. - Is not on hydrea and refuses. - Continue hydration, current pain regimen  - Recommend transfusion to try and get her pain better quicker but she refuses.   She says she will discuss with Dr. Omayra Rohce

## 2018-12-27 LAB
RETICS # AUTO: 0.18 M/UL (ref 0.02–0.08)
RETICS/RBC NFR AUTO: 9.7 % (ref 0.7–2.1)

## 2018-12-27 PROCEDURE — 74011000250 HC RX REV CODE- 250: Performed by: INTERNAL MEDICINE

## 2018-12-27 PROCEDURE — 94760 N-INVAS EAR/PLS OXIMETRY 1: CPT

## 2018-12-27 PROCEDURE — 74011250636 HC RX REV CODE- 250/636: Performed by: INTERNAL MEDICINE

## 2018-12-27 PROCEDURE — 65660000000 HC RM CCU STEPDOWN

## 2018-12-27 PROCEDURE — 86905 BLOOD TYPING RBC ANTIGENS: CPT

## 2018-12-27 PROCEDURE — 86923 COMPATIBILITY TEST ELECTRIC: CPT

## 2018-12-27 PROCEDURE — 36415 COLL VENOUS BLD VENIPUNCTURE: CPT

## 2018-12-27 PROCEDURE — 30233N1 TRANSFUSION OF NONAUTOLOGOUS RED BLOOD CELLS INTO PERIPHERAL VEIN, PERCUTANEOUS APPROACH: ICD-10-PCS | Performed by: INTERNAL MEDICINE

## 2018-12-27 PROCEDURE — 86900 BLOOD TYPING SEROLOGIC ABO: CPT

## 2018-12-27 PROCEDURE — 77010033678 HC OXYGEN DAILY

## 2018-12-27 PROCEDURE — 85660 RBC SICKLE CELL TEST: CPT

## 2018-12-27 PROCEDURE — 74011250637 HC RX REV CODE- 250/637: Performed by: INTERNAL MEDICINE

## 2018-12-27 PROCEDURE — 85045 AUTOMATED RETICULOCYTE COUNT: CPT

## 2018-12-27 PROCEDURE — P9016 RBC LEUKOCYTES REDUCED: HCPCS

## 2018-12-27 PROCEDURE — 36430 TRANSFUSION BLD/BLD COMPNT: CPT

## 2018-12-27 RX ADMIN — HYDROMORPHONE HYDROCHLORIDE 1 MG: 2 INJECTION, SOLUTION INTRAMUSCULAR; INTRAVENOUS; SUBCUTANEOUS at 17:25

## 2018-12-27 RX ADMIN — DEXTROSE MONOHYDRATE, SODIUM CHLORIDE, AND POTASSIUM CHLORIDE 75 ML/HR: 50; 4.5; 1.49 INJECTION, SOLUTION INTRAVENOUS at 11:21

## 2018-12-27 RX ADMIN — ENOXAPARIN SODIUM 40 MG: 40 INJECTION SUBCUTANEOUS at 10:31

## 2018-12-27 RX ADMIN — HYDROMORPHONE HYDROCHLORIDE 1 MG: 2 INJECTION, SOLUTION INTRAMUSCULAR; INTRAVENOUS; SUBCUTANEOUS at 08:23

## 2018-12-27 RX ADMIN — HYDROMORPHONE HYDROCHLORIDE 1 MG: 2 INJECTION, SOLUTION INTRAMUSCULAR; INTRAVENOUS; SUBCUTANEOUS at 12:46

## 2018-12-27 RX ADMIN — HYDROMORPHONE HYDROCHLORIDE 1 MG: 2 INJECTION, SOLUTION INTRAMUSCULAR; INTRAVENOUS; SUBCUTANEOUS at 21:49

## 2018-12-27 RX ADMIN — CITALOPRAM HYDROBROMIDE 10 MG: 20 TABLET ORAL at 17:31

## 2018-12-27 RX ADMIN — Medication 10 ML: at 14:19

## 2018-12-27 RX ADMIN — Medication 10 ML: at 21:49

## 2018-12-27 RX ADMIN — ONDANSETRON 4 MG: 2 INJECTION INTRAMUSCULAR; INTRAVENOUS at 08:23

## 2018-12-27 RX ADMIN — DIPHENHYDRAMINE HYDROCHLORIDE 25 MG: 50 INJECTION, SOLUTION INTRAMUSCULAR; INTRAVENOUS at 12:46

## 2018-12-27 RX ADMIN — ACETAMINOPHEN 650 MG: 325 TABLET ORAL at 23:43

## 2018-12-27 RX ADMIN — HYDROMORPHONE HYDROCHLORIDE 1 MG: 2 INJECTION, SOLUTION INTRAMUSCULAR; INTRAVENOUS; SUBCUTANEOUS at 03:24

## 2018-12-27 RX ADMIN — ONDANSETRON 4 MG: 2 INJECTION INTRAMUSCULAR; INTRAVENOUS at 17:25

## 2018-12-27 RX ADMIN — POLYETHYLENE GLYCOL 3350 17 G: 17 POWDER, FOR SOLUTION ORAL at 10:32

## 2018-12-27 RX ADMIN — DIPHENHYDRAMINE HYDROCHLORIDE 25 MG: 50 INJECTION, SOLUTION INTRAMUSCULAR; INTRAVENOUS at 21:49

## 2018-12-27 RX ADMIN — DIPHENHYDRAMINE HYDROCHLORIDE 25 MG: 50 INJECTION, SOLUTION INTRAMUSCULAR; INTRAVENOUS at 03:24

## 2018-12-27 RX ADMIN — FOLIC ACID 1 MG: 1 TABLET ORAL at 10:32

## 2018-12-27 RX ADMIN — DIPHENHYDRAMINE HYDROCHLORIDE 25 MG: 50 INJECTION, SOLUTION INTRAMUSCULAR; INTRAVENOUS at 08:23

## 2018-12-27 RX ADMIN — DIPHENHYDRAMINE HYDROCHLORIDE 25 MG: 50 INJECTION, SOLUTION INTRAMUSCULAR; INTRAVENOUS at 17:25

## 2018-12-27 RX ADMIN — PANTOPRAZOLE SODIUM 40 MG: 40 TABLET, DELAYED RELEASE ORAL at 10:32

## 2018-12-27 NOTE — PROGRESS NOTES
Hematology Oncology Progress Note         Follow up for: SC crisis    Chart notes reviewed since last visit. Case discussed with following: nursing staff. Patient complains of the following: Having more pain in her rt groin, states she has now agreed to transfusion today after discussing with Dr. Keith Alexandre. Additional concerns noted by the staff:     Patient Vitals for the past 24 hrs:   BP Temp Pulse Resp SpO2   12/27/18 0815 127/67 99 °F (37.2 °C) 95 18 100 %   12/27/18 0257 111/44 98.7 °F (37.1 °C) 89 16 100 %   12/26/18 2316 118/66 98.5 °F (36.9 °C) 97 16 100 %   12/26/18 1932 128/70 99.5 °F (37.5 °C) (!) 103 18 100 %   12/26/18 1531 136/61 99.6 °F (37.6 °C) (!) 111 16 100 %   12/26/18 1107 115/60 99.4 °F (37.4 °C) 95 16 97 %     ROS negative at present for 11 organ systems except as noted. Physical Examination:  Gen NAD  CV reg  Lungs  Clear  Abd benign  Skin: Dry skin noted    Labs:  Recent Results (from the past 24 hour(s))   RETICULOCYTE COUNT    Collection Time: 12/27/18  3:24 AM   Result Value Ref Range    Reticulocyte count 9.7 (H) 0.7 - 2.1 %    Absolute Retic Cnt. 0.1799 (H) 0.0164 - 0.0776 M/ul       Assessment and Plan:   SC crisis -   - Appears comfortable, states pain a bit worse in groin. - Agrees to transfusion today after discussion with dr. Keith Alexandre.  - No h/h today, will order cbc  - Is not on hydrea and refuses.   - Continue hydration, current pain regimen

## 2018-12-27 NOTE — PROGRESS NOTES
Oncology Nursing Communication Tool  7:25 AM  12/27/2018     Bedside shift change report given to SAMIR Toro (incoming nurse) by Paul Dickens (outgoing nurse) on [facility]. Report included the following information SBAR, Kardex, Intake/Output, MAR and Recent Results. Shift Summary: dilaudid x3, pain still at 5-6. Labs drawn. Midline no longer giving blood return despite various manipulation. retic count drawn. Issues for physician to address: none         Oncology Shift Note   Admission Date 12/12/2018   Admission Diagnosis Sickle cell crisis ([facility] [address]   Code Status Full Code   Consults IP CONSULT TO HOSPITALIST  IP CONSULT TO ONCOLOGY      Cardiac Monitoring [x] Yes [] No      Purposeful Hourly Rounding [x] Yes    Mukund Score Total Score: 1   Mukund score 3 or > [] Bed Alarm [] Avasys [] 1:1 sitter [] Patient refused (Place signed refusal form in chart)      Pain Managed [] Yes [x] No    Key Pain Meds             HYDROcodone-acetaminophen (NORCO)  mg tablet Take 1 Tab by mouth every six (6) hours as needed. Max Daily Amount: 4 Tabs. fentaNYL (DURAGESIC) 75 mcg/hr 1 Patch by TransDERmal route every seventy-two (72) hours. Max Daily Amount: 1 Patch. Influenza Vaccine Received Flu Vaccine for Current Season (usually Sept-March): Yes           Oxygen needs? [] Room air Oxygen @  []1L    [x]2L    []3L   []4L    []5L   []6L     Use home O2? [] Yes [] No  Perform O2 challenge test using  smartphrase (.oxygenchallenge)      Last bowel movement Last Bowel Movement Date: 12/26/18  bowel movement      Urinary Catheter             LDAs                                    Readmission Risk Assessment Tool Score High Risk            22       Total Score        3 Has Seen PCP in Last 6 Months (Yes=3, No=0)    3 Patient Length of Stay (>5 days = 3)    9 IP Visits Last 12 Months (1-3=4, 4=9, >4=11)    5 Pt.  Coverage (Medicare=5 , Medicaid, or Self-Pay=4)    2 Charlson Comorbidity Score (Age + Comorbid Conditions)        Criteria that do not apply:    . Living with Significant Other. Assisted Living. LTAC. SNF.  or   Rehab       Expected Length of Stay 2d 16h   Actual Length of Stay [address]

## 2018-12-27 NOTE — PROGRESS NOTES
General Daily Progress Note    Admit Date: 12/12/2018    Subjective:     Patient continues to complain of pain and is now ready to have transfusion. Current Facility-Administered Medications   Medication Dose Route Frequency    HYDROmorphone (PF) (DILAUDID) injection 1 mg  1 mg IntraVENous Q4H PRN    saline peripheral flush soln 10 mL  10 mL InterCATHeter PRN    SALINE PERIPHERAL FLUSH Q8H soln 10 mL  10 mL InterCATHeter Q8H    dextrose 5% - 0.45% NaCl with KCl 20 mEq/L infusion  75 mL/hr IntraVENous CONTINUOUS    diphenhydrAMINE (BENADRYL) injection 25 mg  25 mg IntraVENous Q4H PRN    citalopram (CELEXA) tablet 10 mg  10 mg Oral QPM    cyclobenzaprine (FLEXERIL) tablet 10 mg  10 mg Oral TID PRN    fentaNYL (DURAGESIC) 75 mcg/hr patch 1 Patch  1 Patch TransDERmal M33G    folic acid (FOLVITE) tablet 1 mg  1 mg Oral DAILY    HYDROcodone-acetaminophen (NORCO)  mg tablet 1 Tab  1 Tab Oral Q6H PRN    pantoprazole (PROTONIX) tablet 40 mg  40 mg Oral DAILY    acetaminophen (TYLENOL) tablet 650 mg  650 mg Oral Q6H PRN    ondansetron (ZOFRAN) injection 4 mg  4 mg IntraVENous Q6H PRN    docusate sodium (COLACE) capsule 100 mg  100 mg Oral DAILY PRN    enoxaparin (LOVENOX) injection 40 mg  40 mg SubCUTAneous Q24H    polyethylene glycol (MIRALAX) packet 17 g  17 g Oral BID PRN        Review of Systems  A comprehensive review of systems was negative. Objective:     Patient Vitals for the past 24 hrs:   BP Temp Pulse Resp SpO2   12/27/18 0815 127/67 99 °F (37.2 °C) 95 18 100 %   12/27/18 0257 111/44 98.7 °F (37.1 °C) 89 16 100 %   12/26/18 2316 118/66 98.5 °F (36.9 °C) 97 16 100 %   12/26/18 1932 128/70 99.5 °F (37.5 °C) (!) 103 18 100 %   12/26/18 1531 136/61 99.6 °F (37.6 °C) (!) 111 16 100 %   12/26/18 1107 115/60 99.4 °F (37.4 °C) 95 16 97 %     No intake/output data recorded.   12/25 1901 - 12/27 0700  In: 360 [P.O.:360]  Out: -     Physical Exam:   Visit Vitals  /67 (BP 1 Location: Left arm, BP Patient Position: Sitting)   Pulse 95   Temp 99 °F (37.2 °C)   Resp 18   Ht 5' 11\" (1.803 m)   Wt 169 lb 12.1 oz (77 kg)   SpO2 100%   Breastfeeding? No   BMI 23.68 kg/m²     General appearance: alert, cooperative, no distress, appears stated age  Neck: supple, symmetrical, trachea midline, no adenopathy, thyroid: not enlarged, symmetric, no tenderness/mass/nodules, no carotid bruit and no JVD  Lungs: clear to auscultation bilaterally  Heart: regular rate and rhythm, S1, S2 normal, no murmur, click, rub or gallop  Abdomen: soft, non-tender. Bowel sounds normal. No masses,  no organomegaly  Extremities: extremities normal, atraumatic, no cyanosis or edema        Data Review   Recent Results (from the past 24 hour(s))   RETICULOCYTE COUNT    Collection Time: 12/27/18  3:24 AM   Result Value Ref Range    Reticulocyte count 9.7 (H) 0.7 - 2.1 %    Absolute Retic Cnt. 0.1799 (H) 0.0164 - 0.0776 M/ul           Assessment:     Principal Problem:    Sickle cell crisis ([facility] [address] (5/22/2017)    Active Problems:    Hypertension (10/3/2014)      Depression (10/5/2014)        Plan:     1. Patient has now agreed to a transfusion. She continues to have pain requiring analgesics prompting the desire for transfusion.

## 2018-12-27 NOTE — PROGRESS NOTES
Oncology Nursing Communication Tool  6:29 PM  12/27/2018     Bedside shift change report given to (incoming nurse) by Basilio Rodriguez (outgoing nurse) on Sebas Ramos. Report included the following information SBAR. Shift Summary: Pt did not display any signs of distress or discomfort      Issues for physician to address: Oncology Shift Note   Admission Date 12/12/2018   Admission Diagnosis Sickle cell crisis ([facility] [address]   Code Status Full Code   Consults IP CONSULT TO HOSPITALIST  IP CONSULT TO ONCOLOGY      Cardiac Monitoring [] Yes [] No      Purposeful Hourly Rounding [] Yes    Mukund Score Total Score: 1   Mukund score 3 or > [] Bed Alarm [] Avasys [] 1:1 sitter [] Patient refused (Place signed refusal form in chart)      Pain Managed [] Yes [] No    Key Pain Meds             HYDROcodone-acetaminophen (NORCO)  mg tablet Take 1 Tab by mouth every six (6) hours as needed. Max Daily Amount: 4 Tabs. fentaNYL (DURAGESIC) 75 mcg/hr 1 Patch by TransDERmal route every seventy-two (72) hours. Max Daily Amount: 1 Patch. Influenza Vaccine Received Flu Vaccine for Current Season (usually Sept-March): Yes           Oxygen needs? [] Room air Oxygen @  []1L    []2L    []3L   []4L    []5L   []6L     Use home O2? [] Yes [] No  Perform O2 challenge test using  smartphrase (.oxygenchallenge)      Last bowel movement Last Bowel Movement Date: 12/26/18  bowel movement      Urinary Catheter             LDAs                                    Readmission Risk Assessment Tool Score High Risk            22       Total Score        3 Has Seen PCP in Last 6 Months (Yes=3, No=0)    3 Patient Length of Stay (>5 days = 3)    9 IP Visits Last 12 Months (1-3=4, 4=9, >4=11)    5 Pt. Coverage (Medicare=5 , Medicaid, or Self-Pay=4)    2 Charlson Comorbidity Score (Age + Comorbid Conditions)        Criteria that do not apply:    . Living with Significant Other. Assisted Living. LTAC. SNF.  or   Rehab Expected Length of Stay 2d 16h   Actual Length of Stay [facility]

## 2018-12-28 LAB
ERYTHROCYTE [DISTWIDTH] IN BLOOD BY AUTOMATED COUNT: 17 % (ref 11.5–14.5)
HCT VFR BLD AUTO: 22.8 % (ref 35–47)
HGB BLD-MCNC: 7.7 G/DL (ref 11.5–16)
MCH RBC QN AUTO: 30.8 PG (ref 26–34)
MCHC RBC AUTO-ENTMCNC: 33.8 G/DL (ref 30–36.5)
MCV RBC AUTO: 91.2 FL (ref 80–99)
NRBC # BLD: 0.07 K/UL (ref 0–0.01)
NRBC BLD-RTO: 1.1 PER 100 WBC
PLATELET # BLD AUTO: 195 K/UL (ref 150–400)
PMV BLD AUTO: 10.7 FL (ref 8.9–12.9)
RBC # BLD AUTO: 2.5 M/UL (ref 3.8–5.2)
RETICS # AUTO: 0.2 M/UL (ref 0.02–0.08)
RETICS/RBC NFR AUTO: 7.9 % (ref 0.7–2.1)
WBC # BLD AUTO: 6.4 K/UL (ref 3.6–11)

## 2018-12-28 PROCEDURE — 86902 BLOOD TYPE ANTIGEN DONOR EA: CPT

## 2018-12-28 PROCEDURE — P9016 RBC LEUKOCYTES REDUCED: HCPCS

## 2018-12-28 PROCEDURE — 85027 COMPLETE CBC AUTOMATED: CPT

## 2018-12-28 PROCEDURE — 36430 TRANSFUSION BLD/BLD COMPNT: CPT

## 2018-12-28 PROCEDURE — 36415 COLL VENOUS BLD VENIPUNCTURE: CPT

## 2018-12-28 PROCEDURE — 74011250636 HC RX REV CODE- 250/636: Performed by: INTERNAL MEDICINE

## 2018-12-28 PROCEDURE — 77010033678 HC OXYGEN DAILY

## 2018-12-28 PROCEDURE — 74011250637 HC RX REV CODE- 250/637: Performed by: INTERNAL MEDICINE

## 2018-12-28 PROCEDURE — 85045 AUTOMATED RETICULOCYTE COUNT: CPT

## 2018-12-28 PROCEDURE — 94760 N-INVAS EAR/PLS OXIMETRY 1: CPT

## 2018-12-28 PROCEDURE — 65660000000 HC RM CCU STEPDOWN

## 2018-12-28 RX ORDER — POTASSIUM CHLORIDE 750 MG/1
20 TABLET, FILM COATED, EXTENDED RELEASE ORAL ONCE
Status: COMPLETED | OUTPATIENT
Start: 2018-12-29 | End: 2018-12-29

## 2018-12-28 RX ORDER — FUROSEMIDE 10 MG/ML
40 INJECTION INTRAMUSCULAR; INTRAVENOUS ONCE
Status: COMPLETED | OUTPATIENT
Start: 2018-12-29 | End: 2018-12-29

## 2018-12-28 RX ORDER — HYDROMORPHONE HYDROCHLORIDE 2 MG/ML
1 INJECTION, SOLUTION INTRAMUSCULAR; INTRAVENOUS; SUBCUTANEOUS
Status: DISCONTINUED | OUTPATIENT
Start: 2018-12-29 | End: 2018-12-30

## 2018-12-28 RX ADMIN — HYDROMORPHONE HYDROCHLORIDE 1 MG: 2 INJECTION, SOLUTION INTRAMUSCULAR; INTRAVENOUS; SUBCUTANEOUS at 15:58

## 2018-12-28 RX ADMIN — FOLIC ACID 1 MG: 1 TABLET ORAL at 10:48

## 2018-12-28 RX ADMIN — DIPHENHYDRAMINE HYDROCHLORIDE 25 MG: 50 INJECTION, SOLUTION INTRAMUSCULAR; INTRAVENOUS at 20:07

## 2018-12-28 RX ADMIN — CYCLOBENZAPRINE HYDROCHLORIDE 10 MG: 10 TABLET, FILM COATED ORAL at 09:50

## 2018-12-28 RX ADMIN — DEXTROSE MONOHYDRATE, SODIUM CHLORIDE, AND POTASSIUM CHLORIDE 75 ML/HR: 50; 4.5; 1.49 INJECTION, SOLUTION INTRAVENOUS at 09:25

## 2018-12-28 RX ADMIN — DIPHENHYDRAMINE HYDROCHLORIDE 25 MG: 50 INJECTION, SOLUTION INTRAMUSCULAR; INTRAVENOUS at 15:58

## 2018-12-28 RX ADMIN — HYDROCODONE BITARTRATE AND ACETAMINOPHEN 1 TABLET: 10; 325 TABLET ORAL at 17:52

## 2018-12-28 RX ADMIN — PANTOPRAZOLE SODIUM 40 MG: 40 TABLET, DELAYED RELEASE ORAL at 10:49

## 2018-12-28 RX ADMIN — Medication 10 ML: at 06:01

## 2018-12-28 RX ADMIN — DIPHENHYDRAMINE HYDROCHLORIDE 25 MG: 50 INJECTION, SOLUTION INTRAMUSCULAR; INTRAVENOUS at 07:16

## 2018-12-28 RX ADMIN — ONDANSETRON 4 MG: 2 INJECTION INTRAMUSCULAR; INTRAVENOUS at 11:39

## 2018-12-28 RX ADMIN — CYCLOBENZAPRINE HYDROCHLORIDE 10 MG: 10 TABLET, FILM COATED ORAL at 17:52

## 2018-12-28 RX ADMIN — HYDROMORPHONE HYDROCHLORIDE 1 MG: 2 INJECTION, SOLUTION INTRAMUSCULAR; INTRAVENOUS; SUBCUTANEOUS at 07:17

## 2018-12-28 RX ADMIN — ENOXAPARIN SODIUM 40 MG: 40 INJECTION SUBCUTANEOUS at 10:48

## 2018-12-28 RX ADMIN — DIPHENHYDRAMINE HYDROCHLORIDE 25 MG: 50 INJECTION, SOLUTION INTRAMUSCULAR; INTRAVENOUS at 11:39

## 2018-12-28 RX ADMIN — HYDROMORPHONE HYDROCHLORIDE 1 MG: 2 INJECTION, SOLUTION INTRAMUSCULAR; INTRAVENOUS; SUBCUTANEOUS at 11:39

## 2018-12-28 RX ADMIN — HYDROMORPHONE HYDROCHLORIDE 1 MG: 2 INJECTION, SOLUTION INTRAMUSCULAR; INTRAVENOUS; SUBCUTANEOUS at 03:07

## 2018-12-28 RX ADMIN — Medication 10 ML: at 14:00

## 2018-12-28 RX ADMIN — DIPHENHYDRAMINE HYDROCHLORIDE 25 MG: 50 INJECTION, SOLUTION INTRAMUSCULAR; INTRAVENOUS at 03:06

## 2018-12-28 RX ADMIN — Medication 10 ML: at 20:07

## 2018-12-28 RX ADMIN — HYDROMORPHONE HYDROCHLORIDE 1 MG: 2 INJECTION, SOLUTION INTRAMUSCULAR; INTRAVENOUS; SUBCUTANEOUS at 20:07

## 2018-12-28 RX ADMIN — ONDANSETRON 4 MG: 2 INJECTION INTRAMUSCULAR; INTRAVENOUS at 03:06

## 2018-12-28 RX ADMIN — CITALOPRAM HYDROBROMIDE 10 MG: 20 TABLET ORAL at 16:38

## 2018-12-28 NOTE — PROGRESS NOTES
Oncology Nursing Communication Tool 5:25 PM 
12/28/2018 Bedside shift change report given to SAMIR Aranda (incoming nurse) by Randal Berrios (outgoing nurse) on [facility]. Report included the following information SBAR. Shift Summary: Pt received pain medications and rested during shift without displaying any obvious signs of distress or discomfort Issues for physician to address: Oncology Shift Note Admission Date 12/12/2018 Admission Diagnosis Sickle cell crisis ([facility] [address] Code Status Full Code Consults IP CONSULT TO HOSPITALIST 
IP CONSULT TO ONCOLOGY Cardiac Monitoring [] Yes [] No  
  
Purposeful Hourly Rounding [] Yes   
Mukund Score Total Score: 1 Mukund score 3 or > [] Bed Alarm [] Avasys [] 1:1 sitter [] Patient refused (Place signed refusal form in chart) Pain Managed [] Yes [] No  
 Key Pain Meds HYDROcodone-acetaminophen (NORCO)  mg tablet Take 1 Tab by mouth every six (6) hours as needed. Max Daily Amount: 4 Tabs. fentaNYL (DURAGESIC) 75 mcg/hr 1 Patch by TransDERmal route every seventy-two (72) hours. Max Daily Amount: 1 Patch. Influenza Vaccine Received Flu Vaccine for Current Season (usually Sept-March): Yes Oxygen needs? [] Room air Oxygen @  []1L    []2L    []3L   []4L    []5L   []6L Use home O2? [] Yes [] No 
Perform O2 challenge test using  smartphrase (.oxygenchallenge) Last bowel movement Last Bowel Movement Date: 12/26/18 
bowel movement Urinary Catheter LDAs Readmission Risk Assessment Tool Score High Risk   
      
 22 Total Score 3 Has Seen PCP in Last 6 Months (Yes=3, No=0) 3 Patient Length of Stay (>5 days = 3) 9 IP Visits Last 12 Months (1-3=4, 4=9, >4=11) 5 Pt. Coverage (Medicare=5 , Medicaid, or Self-Pay=4) 2 Charlson Comorbidity Score (Age + Comorbid Conditions) Criteria that do not apply: . Living with Significant Other. Assisted Living. LTAC. SNF. or  
Rehab Expected Length of Stay 2d 16h Actual Length of Stay 16 Ernesto Verdin

## 2018-12-28 NOTE — PROGRESS NOTES
Hematology Oncology Progress Note Follow up for: SC crisis Chart notes reviewed since last visit. Case discussed with following: nursing staff. Patient complains of the following: Had 2 UPRBC, last unit finished this am. 
 
Additional concerns noted by the staff:  
 
Patient Vitals for the past 24 hrs: 
 BP Temp Pulse Resp SpO2  
12/28/18 0729 106/63 99.6 °F (37.6 °C) 91 16 96 % 12/28/18 0530 130/88 97.5 °F (36.4 °C) 86 16 100 % 12/28/18 0430 113/67 98.3 °F (36.8 °C) 88 16 100 % 12/28/18 0400 114/73 98.2 °F (36.8 °C) 86 16 99 % 12/28/18 0344 112/70 98.5 °F (36.9 °C) 86 17 98 % 12/28/18 0315 120/84 98.5 °F (36.9 °C) 88 18   
12/28/18 0230 98/53 98.2 °F (36.8 °C) 92 16 98 % 12/28/18 0130 103/62 98.1 °F (36.7 °C) 88 16 100 % 12/28/18 0100 112/63 97.8 °F (36.6 °C) 89 16 100 % 12/28/18 0030 115/74 98.6 °F (37 °C) 86 16 99 % 12/28/18 0015 116/77 98.5 °F (36.9 °C) 87 16 98 % 12/27/18 2339 121/77 99 °F (37.2 °C) 87 16 98 % 12/27/18 2254 98/52 98.4 °F (36.9 °C) 96 16 100 % 12/27/18 2028 121/66 99.2 °F (37.3 °C) 96 16 100 % 12/27/18 1544 102/63 98.5 °F (36.9 °C) (!) 105 16 99 % 12/27/18 1125 99/55 99 °F (37.2 °C) 92 16 99 % ROS negative at present for 11 organ systems except as noted. Physical Examination: 
Gen NAD 
CV reg Lungs  Clear Abd benign Skin: Dry skin noted Labs: 
Recent Results (from the past 24 hour(s)) TYPE + CROSSMATCH Collection Time: 12/27/18  2:45 PM  
Result Value Ref Range Crossmatch Expiration 12/30/2018 ABO/Rh(D) A NEGATIVE Antibody screen NEG Physician instructions [address] [facility] ANTIGEN TYPING    
  C NEGATIVE, 
c POSITIVE, 
E NEGATIVE, 
e POSITIVE, LINDA NEGATIVE, Unit number [id number] Blood component type [facility] Unit division 00 Status of unit ISSUED ANTIGEN/ANTIBODY INFO C NEGATIVE, 
E NEGATIVE, LINDA NEGATIVE, Crossmatch result Compatible Unit number [id number] Blood component type [facility] Unit division 00 Status of unit TRANSFUSED ANTIGEN/ANTIBODY INFO C NEGATIVE, 
E NEGATIVE, LINDA NEGATIVE, Crossmatch result Compatible RETICULOCYTE COUNT Collection Time: 12/28/18  6:58 AM  
Result Value Ref Range Reticulocyte count 7.9 (H) 0.7 - 2.1 % Absolute Retic Cnt. 0.1978 (H) 0.0164 - 0.0776 M/ul CBC W/O DIFF Collection Time: 12/28/18  6:58 AM  
Result Value Ref Range WBC 6.4 3.6 - 11.0 K/uL  
 RBC 2.50 (L) 3.80 - 5.20 M/uL HGB 7.7 (L) 11.5 - 16.0 g/dL HCT 22.8 (L) 35.0 - 47.0 % MCV 91.2 80.0 - 99.0 FL  
 MCH 30.8 26.0 - 34.0 PG  
 MCHC 33.8 30.0 - 36.5 g/dL  
 RDW 17.0 (H) 11.5 - 14.5 % PLATELET 670 507 - 978 K/uL MPV 10.7 8.9 - 12.9 FL  
 NRBC 1.1 (H) 0  WBC ABSOLUTE NRBC 0.07 (H) 0.00 - 0.01 K/uL Assessment and Plan:  
SC crisis -  
- Appears comfortable 
- finally agreed to transfusion, had 2U PRBC overnight. Hbg improved to 7.7 
- Is not on hydrea and refuses. - Continue hydration, current pain regimen

## 2018-12-28 NOTE — PROGRESS NOTES
Problem: Falls - Risk of 
Goal: *Absence of Falls Document Alexis Dust Fall Risk and appropriate interventions in the flowsheet. Outcome: Progressing Towards Goal 
Fall Risk Interventions: 
  
 
  
 
Medication Interventions: Evaluate medications/consider consulting pharmacy

## 2018-12-29 ENCOUNTER — APPOINTMENT (OUTPATIENT)
Dept: GENERAL RADIOLOGY | Age: 58
DRG: 812 | End: 2018-12-29
Attending: INTERNAL MEDICINE
Payer: MEDICARE

## 2018-12-29 LAB
ABO + RH BLD: NORMAL
ANTIGENS PRESENT BLD: NORMAL
ANTIGENS PRESENT RBC DONR: NORMAL
ANTIGENS PRESENT RBC DONR: NORMAL
BLD PROD TYP BPU: NORMAL
BLD PROD TYP BPU: NORMAL
BLOOD GROUP ANTIBODIES SERPL: NORMAL
BPU ID: NORMAL
BPU ID: NORMAL
CROSSMATCH RESULT,%XM: NORMAL
CROSSMATCH RESULT,%XM: NORMAL
ERYTHROCYTE [DISTWIDTH] IN BLOOD BY AUTOMATED COUNT: 17.4 % (ref 11.5–14.5)
HCT VFR BLD AUTO: 24.2 % (ref 35–47)
HGB BLD-MCNC: 8 G/DL (ref 11.5–16)
MCH RBC QN AUTO: 30 PG (ref 26–34)
MCHC RBC AUTO-ENTMCNC: 33.1 G/DL (ref 30–36.5)
MCV RBC AUTO: 90.6 FL (ref 80–99)
NRBC # BLD: 0.05 K/UL (ref 0–0.01)
NRBC BLD-RTO: 0.8 PER 100 WBC
PHYSICIAN INSTRUCTIO,%PI: NORMAL
PLATELET # BLD AUTO: 205 K/UL (ref 150–400)
PMV BLD AUTO: 11.9 FL (ref 8.9–12.9)
RBC # BLD AUTO: 2.67 M/UL (ref 3.8–5.2)
RETICS # AUTO: 0.21 M/UL (ref 0.02–0.08)
RETICS/RBC NFR AUTO: 8 % (ref 0.7–2.1)
SPECIMEN EXP DATE BLD: NORMAL
STATUS OF UNIT,%ST: NORMAL
STATUS OF UNIT,%ST: NORMAL
UNIT DIVISION, %UDIV: 0
UNIT DIVISION, %UDIV: 0
WBC # BLD AUTO: 6.5 K/UL (ref 3.6–11)

## 2018-12-29 PROCEDURE — 74011250636 HC RX REV CODE- 250/636: Performed by: INTERNAL MEDICINE

## 2018-12-29 PROCEDURE — 85027 COMPLETE CBC AUTOMATED: CPT

## 2018-12-29 PROCEDURE — 36415 COLL VENOUS BLD VENIPUNCTURE: CPT

## 2018-12-29 PROCEDURE — 74011250637 HC RX REV CODE- 250/637: Performed by: INTERNAL MEDICINE

## 2018-12-29 PROCEDURE — 65660000000 HC RM CCU STEPDOWN

## 2018-12-29 PROCEDURE — 73502 X-RAY EXAM HIP UNI 2-3 VIEWS: CPT

## 2018-12-29 PROCEDURE — 77010033678 HC OXYGEN DAILY

## 2018-12-29 PROCEDURE — 85045 AUTOMATED RETICULOCYTE COUNT: CPT

## 2018-12-29 PROCEDURE — 94760 N-INVAS EAR/PLS OXIMETRY 1: CPT

## 2018-12-29 RX ADMIN — FOLIC ACID 1 MG: 1 TABLET ORAL at 09:16

## 2018-12-29 RX ADMIN — HYDROMORPHONE HYDROCHLORIDE 1 MG: 2 INJECTION, SOLUTION INTRAMUSCULAR; INTRAVENOUS; SUBCUTANEOUS at 22:21

## 2018-12-29 RX ADMIN — HYDROCODONE BITARTRATE AND ACETAMINOPHEN 1 TABLET: 10; 325 TABLET ORAL at 13:11

## 2018-12-29 RX ADMIN — FUROSEMIDE 40 MG: 10 INJECTION, SOLUTION INTRAMUSCULAR; INTRAVENOUS at 09:15

## 2018-12-29 RX ADMIN — ENOXAPARIN SODIUM 40 MG: 40 INJECTION SUBCUTANEOUS at 09:16

## 2018-12-29 RX ADMIN — ONDANSETRON 4 MG: 2 INJECTION INTRAMUSCULAR; INTRAVENOUS at 09:22

## 2018-12-29 RX ADMIN — HYDROMORPHONE HYDROCHLORIDE 1 MG: 2 INJECTION, SOLUTION INTRAMUSCULAR; INTRAVENOUS; SUBCUTANEOUS at 09:22

## 2018-12-29 RX ADMIN — ONDANSETRON 4 MG: 2 INJECTION INTRAMUSCULAR; INTRAVENOUS at 15:22

## 2018-12-29 RX ADMIN — HYDROMORPHONE HYDROCHLORIDE 1 MG: 2 INJECTION, SOLUTION INTRAMUSCULAR; INTRAVENOUS; SUBCUTANEOUS at 15:22

## 2018-12-29 RX ADMIN — DEXTROSE MONOHYDRATE, SODIUM CHLORIDE, AND POTASSIUM CHLORIDE 25 ML/HR: 50; 4.5; 1.49 INJECTION, SOLUTION INTRAVENOUS at 03:16

## 2018-12-29 RX ADMIN — HYDROMORPHONE HYDROCHLORIDE 1 MG: 2 INJECTION, SOLUTION INTRAMUSCULAR; INTRAVENOUS; SUBCUTANEOUS at 03:16

## 2018-12-29 RX ADMIN — Medication 10 ML: at 09:17

## 2018-12-29 RX ADMIN — CYCLOBENZAPRINE HYDROCHLORIDE 10 MG: 10 TABLET, FILM COATED ORAL at 11:07

## 2018-12-29 RX ADMIN — HYDROCODONE BITARTRATE AND ACETAMINOPHEN 1 TABLET: 10; 325 TABLET ORAL at 19:30

## 2018-12-29 RX ADMIN — ONDANSETRON 4 MG: 2 INJECTION INTRAMUSCULAR; INTRAVENOUS at 03:16

## 2018-12-29 RX ADMIN — CITALOPRAM HYDROBROMIDE 10 MG: 20 TABLET ORAL at 17:55

## 2018-12-29 RX ADMIN — DIPHENHYDRAMINE HYDROCHLORIDE 25 MG: 50 INJECTION, SOLUTION INTRAMUSCULAR; INTRAVENOUS at 03:16

## 2018-12-29 RX ADMIN — DIPHENHYDRAMINE HYDROCHLORIDE 25 MG: 50 INJECTION, SOLUTION INTRAMUSCULAR; INTRAVENOUS at 22:11

## 2018-12-29 RX ADMIN — DIPHENHYDRAMINE HYDROCHLORIDE 25 MG: 50 INJECTION, SOLUTION INTRAMUSCULAR; INTRAVENOUS at 15:58

## 2018-12-29 RX ADMIN — DIPHENHYDRAMINE HYDROCHLORIDE 25 MG: 50 INJECTION, SOLUTION INTRAMUSCULAR; INTRAVENOUS at 09:58

## 2018-12-29 RX ADMIN — POTASSIUM CHLORIDE 20 MEQ: 750 TABLET, EXTENDED RELEASE ORAL at 09:15

## 2018-12-29 RX ADMIN — PANTOPRAZOLE SODIUM 40 MG: 40 TABLET, DELAYED RELEASE ORAL at 09:15

## 2018-12-29 RX ADMIN — ONDANSETRON 4 MG: 2 INJECTION INTRAMUSCULAR; INTRAVENOUS at 22:11

## 2018-12-29 NOTE — PROGRESS NOTES
General Daily Progress Note Admit Date: 12/12/2018 Subjective:  
 
Patient feels better. Current Facility-Administered Medications Medication Dose Route Frequency  [START ON 12/29/2018] furosemide (LASIX) injection 40 mg  40 mg IntraVENous ONCE  
 [START ON 12/29/2018] potassium chloride SR (KLOR-CON 10) tablet 20 mEq  20 mEq Oral ONCE  
 HYDROmorphone (PF) (DILAUDID) injection 1 mg  1 mg IntraVENous Q4H PRN  
 saline peripheral flush soln 10 mL  10 mL InterCATHeter PRN  
 SALINE PERIPHERAL FLUSH Q8H soln 10 mL  10 mL InterCATHeter Q8H  
 dextrose 5% - 0.45% NaCl with KCl 20 mEq/L infusion  25 mL/hr IntraVENous CONTINUOUS  
 diphenhydrAMINE (BENADRYL) injection 25 mg  25 mg IntraVENous Q4H PRN  
 citalopram (CELEXA) tablet 10 mg  10 mg Oral QPM  
 cyclobenzaprine (FLEXERIL) tablet 10 mg  10 mg Oral TID PRN  
 fentaNYL (DURAGESIC) 75 mcg/hr patch 1 Patch  1 Patch TransDERmal Q72H  folic acid (FOLVITE) tablet 1 mg  1 mg Oral DAILY  HYDROcodone-acetaminophen (NORCO)  mg tablet 1 Tab  1 Tab Oral Q6H PRN  pantoprazole (PROTONIX) tablet 40 mg  40 mg Oral DAILY  acetaminophen (TYLENOL) tablet 650 mg  650 mg Oral Q6H PRN  
 ondansetron (ZOFRAN) injection 4 mg  4 mg IntraVENous Q6H PRN  
 docusate sodium (COLACE) capsule 100 mg  100 mg Oral DAILY PRN  
 enoxaparin (LOVENOX) injection 40 mg  40 mg SubCUTAneous Q24H  polyethylene glycol (MIRALAX) packet 17 g  17 g Oral BID PRN Review of Systems A comprehensive review of systems was negative. Objective:  
 
Patient Vitals for the past 24 hrs: 
 BP Temp Pulse Resp SpO2  
12/28/18 1942 124/66 98.4 °F (36.9 °C) 92 16 100 % 12/28/18 1519 98/81 98.6 °F (37 °C) 87 16 100 % 12/28/18 1115 121/74 98.7 °F (37.1 °C) 93 16 99 % 12/28/18 0729 106/63 99.6 °F (37.6 °C) 91 16 96 % 12/28/18 0530 130/88 97.5 °F (36.4 °C) 86 16 100 % 12/28/18 0430 113/67 98.3 °F (36.8 °C) 88 16 100 % 12/28/18 0400 114/73 98.2 °F (36.8 °C) 86 16 99 % 12/28/18 0344 112/70 98.5 °F (36.9 °C) 86 17 98 % 12/28/18 0315 120/84 98.5 °F (36.9 °C) 88 18   
12/28/18 0230 98/53 98.2 °F (36.8 °C) 92 16 98 % 12/28/18 0130 103/62 98.1 °F (36.7 °C) 88 16 100 % 12/28/18 0100 112/63 97.8 °F (36.6 °C) 89 16 100 % 12/28/18 0030 115/74 98.6 °F (37 °C) 86 16 99 % 12/28/18 0015 116/77 98.5 °F (36.9 °C) 87 16 98 % 12/27/18 2339 121/77 99 °F (37.2 °C) 87 16 98 % 12/27/18 2254 98/52 98.4 °F (36.9 °C) 96 16 100 % 12/27/18 2028 121/66 99.2 °F (37.3 °C) 96 16 100 % No intake/output data recorded. 12/27 0701 - 12/28 1900 In: 600.4 Out: - Physical Exam:  
Visit Vitals /66 (BP 1 Location: Left arm, BP Patient Position: At rest) Pulse 92 Temp 98.4 °F (36.9 °C) Resp 16 Ht 5' 11\" (1.803 m) Wt 169 lb 12.1 oz (77 kg) SpO2 100% Breastfeeding? No  
BMI 23.68 kg/m² General appearance: alert, cooperative, no distress, appears stated age Neck: supple, symmetrical, trachea midline, no adenopathy, thyroid: not enlarged, symmetric, no tenderness/mass/nodules, no carotid bruit and no JVD Lungs: clear to auscultation bilaterally Heart: regular rate and rhythm, S1, S2 normal, no murmur, click, rub or gallop Abdomen: soft, non-tender. Bowel sounds normal. No masses,  no organomegaly Extremities: edema 2+ Data Review Recent Results (from the past 24 hour(s)) RETICULOCYTE COUNT Collection Time: 12/28/18  6:58 AM  
Result Value Ref Range Reticulocyte count 7.9 (H) 0.7 - 2.1 % Absolute Retic Cnt. 0.1978 (H) 0.0164 - 0.0776 M/ul CBC W/O DIFF Collection Time: 12/28/18  6:58 AM  
Result Value Ref Range WBC 6.4 3.6 - 11.0 K/uL  
 RBC 2.50 (L) 3.80 - 5.20 M/uL HGB 7.7 (L) 11.5 - 16.0 g/dL HCT 22.8 (L) 35.0 - 47.0 % MCV 91.2 80.0 - 99.0 FL  
 MCH 30.8 26.0 - 34.0 PG  
 MCHC 33.8 30.0 - 36.5 g/dL  
 RDW 17.0 (H) 11.5 - 14.5 % PLATELET 594 386 - 662 K/uL MPV 10.7 8.9 - 12.9 FL  
 NRBC 1.1 (H) 0  WBC ABSOLUTE NRBC 0.07 (H) 0.00 - 0.01 K/uL Assessment:  
 
Principal Problem: 
  Sickle cell crisis ([facility] [address] (5/22/2017) Active Problems: Hypertension (10/3/2014) Depression (10/5/2014) Plan: 1. She feels much better since transfusion. 2.  Will continue to reduce analgesics.

## 2018-12-29 NOTE — PROGRESS NOTES
General Daily Progress Note Admit Date: 12/12/2018 Subjective:  
 
Patient feels better - but continues with r hip pain Current Facility-Administered Medications Medication Dose Route Frequency  HYDROmorphone (PF) (DILAUDID) injection 1 mg  1 mg IntraVENous Q6H PRN  
 saline peripheral flush soln 10 mL  10 mL InterCATHeter PRN  
 SALINE PERIPHERAL FLUSH Q8H soln 10 mL  10 mL InterCATHeter Q8H  
 dextrose 5% - 0.45% NaCl with KCl 20 mEq/L infusion  25 mL/hr IntraVENous CONTINUOUS  
 diphenhydrAMINE (BENADRYL) injection 25 mg  25 mg IntraVENous Q4H PRN  
 citalopram (CELEXA) tablet 10 mg  10 mg Oral QPM  
 cyclobenzaprine (FLEXERIL) tablet 10 mg  10 mg Oral TID PRN  
 fentaNYL (DURAGESIC) 75 mcg/hr patch 1 Patch  1 Patch TransDERmal Q72H  folic acid (FOLVITE) tablet 1 mg  1 mg Oral DAILY  HYDROcodone-acetaminophen (NORCO)  mg tablet 1 Tab  1 Tab Oral Q6H PRN  pantoprazole (PROTONIX) tablet 40 mg  40 mg Oral DAILY  acetaminophen (TYLENOL) tablet 650 mg  650 mg Oral Q6H PRN  
 ondansetron (ZOFRAN) injection 4 mg  4 mg IntraVENous Q6H PRN  
 docusate sodium (COLACE) capsule 100 mg  100 mg Oral DAILY PRN  
 enoxaparin (LOVENOX) injection 40 mg  40 mg SubCUTAneous Q24H  polyethylene glycol (MIRALAX) packet 17 g  17 g Oral BID PRN Review of Systems A comprehensive review of systems was negative. Objective:  
 
Patient Vitals for the past 24 hrs: 
 BP Temp Pulse Resp SpO2  
12/29/18 1107 117/69 98.3 °F (36.8 °C) 93 19 99 % 12/29/18 0915 128/85  91    
12/29/18 0900 128/85 98.6 °F (37 °C) 91 20 95 % 12/29/18 0308 115/72 98.1 °F (36.7 °C) 93 18 97 % 12/28/18 2233 117/64 98.8 °F (37.1 °C) 94 14 98 % 12/28/18 1942 124/66 98.4 °F (36.9 °C) 92 16 100 % 12/28/18 1519 98/81 98.6 °F (37 °C) 87 16 100 % 12/29 0701 - 12/29 1900 In: 480 [P.O.:480] Out: -  
12/27 1901 - 12/29 0700 In: 600.4 Out: - Physical Exam:  
Visit Vitals /69 Pulse 93 Temp 98.3 °F (36.8 °C) Resp 19 Ht 5' 11\" (1.803 m) Wt 169 lb 12.1 oz (77 kg) SpO2 99% Breastfeeding? No  
BMI 23.68 kg/m² General appearance: alert, cooperative, no distress, appears stated age Neck: supple, symmetrical, trachea midline, no adenopathy, thyroid: not enlarged, symmetric, no tenderness/mass/nodules, no carotid bruit and no JVD Lungs: clear to auscultation bilaterally Heart: regular rate and rhythm, S1, S2 normal, no murmur, click, rub or gallop Abdomen: soft, non-tender. Bowel sounds normal. No masses,  no organomegaly Extremities: edema 2+ Data Review Recent Results (from the past 24 hour(s)) CBC W/O DIFF Collection Time: 12/29/18  3:09 AM  
Result Value Ref Range WBC 6.5 3.6 - 11.0 K/uL  
 RBC 2.67 (L) 3.80 - 5.20 M/uL HGB 8.0 (L) 11.5 - 16.0 g/dL HCT 24.2 (L) 35.0 - 47.0 % MCV 90.6 80.0 - 99.0 FL  
 MCH 30.0 26.0 - 34.0 PG  
 MCHC 33.1 30.0 - 36.5 g/dL  
 RDW 17.4 (H) 11.5 - 14.5 % PLATELET 036 460 - 797 K/uL MPV 11.9 8.9 - 12.9 FL  
 NRBC 0.8 (H) 0  WBC ABSOLUTE NRBC 0.05 (H) 0.00 - 0.01 K/uL RETICULOCYTE COUNT Collection Time: 12/29/18  3:09 AM  
Result Value Ref Range Reticulocyte count 8.0 (H) 0.7 - 2.1 % Absolute Retic Cnt. 0.2123 (H) 0.0164 - 0.0776 M/ul Assessment:  
 
Principal Problem: 
  Sickle cell crisis ([facility] [address] (5/22/2017) Active Problems: Hypertension (10/3/2014) Depression (10/5/2014) Plan: 1. She feels much better since transfusion. 2.  Will continue to reduce analgesics. 3.  xr hip

## 2018-12-29 NOTE — PROGRESS NOTES
Problem: Pain Goal: *Control of Pain Outcome: Progressing Towards Goal 
Assess pain Q4h & prn. Rating pain \"6-7\" out of \"10\" on scale of \"0-10\". Medicating with 1mg IV Dilaudid Q6h prn.

## 2018-12-29 NOTE — PROGRESS NOTES
General Surgery End of Shift Nursing Note Bedside shift change report given to *** (oncoming nurse) by Bhumika Garcia (offgoing nurse). Report included the following information SBAR, Kardex, Intake/Output, MAR and Recent Results. Shift worked:   D Summary of shift:    0 Issues for physician to address:   0 Number times ambulated in hallway past shift: 0 Number of times OOB to chair past shift: 0 Pain Management: 
Current medication: See [facility] Patient states pain is manageable on current pain medication: YES 
 
GI: 
 
Current diet:  DIET REGULAR Tolerating current diet: YES Passing flatus: YES Last Bowel Movement: several days ago Appearance: 0 Respiratory: 
 
Incentive Spirometer at bedside: YES Patient instructed on use: YES Patient Safety: 
 
Falls Score: 1 Bed Alarm On? No 
Sitter?  No 
 
Cheryle Situ, RN

## 2018-12-29 NOTE — PROGRESS NOTES
Oncology Nursing Communication Tool 7:18 AM 
12/29/2018 Bedside shift change report given to Juan Del Valle RN (incoming nurse) by Emanuel Moyer RN (outgoing nurse) on John Renteria. Report included the following information SBAR, Kardex, MAR and Accordion. Shift Summary: PRN dilaudid x2, midline dressing changed Issues for physician to address: Oncology Shift Note Admission Date 12/12/2018 Admission Diagnosis Sickle cell crisis ([facility] [address] Code Status Full Code Consults IP CONSULT TO HOSPITALIST 
IP CONSULT TO ONCOLOGY Cardiac Monitoring [x] Yes [] No  
  
Purposeful Hourly Rounding [x] Yes   
Mukund Score Total Score: 1 Mukund score 3 or > [] Bed Alarm [] Avasys [] 1:1 sitter [] Patient refused (Place signed refusal form in chart) Pain Managed [x] Yes [] No  
 Key Pain Meds HYDROcodone-acetaminophen (NORCO)  mg tablet Take 1 Tab by mouth every six (6) hours as needed. Max Daily Amount: 4 Tabs. fentaNYL (DURAGESIC) 75 mcg/hr 1 Patch by TransDERmal route every seventy-two (72) hours. Max Daily Amount: 1 Patch. Influenza Vaccine Received Flu Vaccine for Current Season (usually Sept-March): Yes Oxygen needs? [] Room air Oxygen @  []1L    [x]2L    []3L   []4L    []5L   []6L Use home O2? [] Yes [x] No 
Perform O2 challenge test using  smartphrase (.oxygenchallenge) Last bowel movement Last Bowel Movement Date: 12/26/18 
bowel movement Urinary Catheter LDAs Readmission Risk Assessment Tool Score High Risk   
      
 22 Total Score 3 Has Seen PCP in Last 6 Months (Yes=3, No=0) 3 Patient Length of Stay (>5 days = 3) 9 IP Visits Last 12 Months (1-3=4, 4=9, >4=11) 5 Pt. Coverage (Medicare=5 , Medicaid, or Self-Pay=4) 2 Charlson Comorbidity Score (Age + Comorbid Conditions) Criteria that do not apply: . Living with Significant Other. Assisted Living. LTAC. SNF. or  
Rehab Expected Length of Stay 2d 16h Actual Length of Stay 17 Ortega Rojo RN

## 2018-12-30 LAB
ERYTHROCYTE [DISTWIDTH] IN BLOOD BY AUTOMATED COUNT: 17.3 % (ref 11.5–14.5)
HCT VFR BLD AUTO: 24.4 % (ref 35–47)
HGB BLD-MCNC: 8.1 G/DL (ref 11.5–16)
MCH RBC QN AUTO: 30.3 PG (ref 26–34)
MCHC RBC AUTO-ENTMCNC: 33.2 G/DL (ref 30–36.5)
MCV RBC AUTO: 91.4 FL (ref 80–99)
NRBC # BLD: 0.04 K/UL (ref 0–0.01)
NRBC BLD-RTO: 0.6 PER 100 WBC
PLATELET # BLD AUTO: 246 K/UL (ref 150–400)
PMV BLD AUTO: 11.3 FL (ref 8.9–12.9)
RBC # BLD AUTO: 2.67 M/UL (ref 3.8–5.2)
RETICS # AUTO: 0.19 M/UL (ref 0.02–0.08)
RETICS/RBC NFR AUTO: 7.1 % (ref 0.7–2.1)
WBC # BLD AUTO: 6.8 K/UL (ref 3.6–11)

## 2018-12-30 PROCEDURE — 74011250636 HC RX REV CODE- 250/636: Performed by: INTERNAL MEDICINE

## 2018-12-30 PROCEDURE — 74011250637 HC RX REV CODE- 250/637: Performed by: INTERNAL MEDICINE

## 2018-12-30 PROCEDURE — 77010033678 HC OXYGEN DAILY

## 2018-12-30 PROCEDURE — 85045 AUTOMATED RETICULOCYTE COUNT: CPT

## 2018-12-30 PROCEDURE — 65660000000 HC RM CCU STEPDOWN

## 2018-12-30 PROCEDURE — 85027 COMPLETE CBC AUTOMATED: CPT

## 2018-12-30 PROCEDURE — 36415 COLL VENOUS BLD VENIPUNCTURE: CPT

## 2018-12-30 PROCEDURE — 94760 N-INVAS EAR/PLS OXIMETRY 1: CPT

## 2018-12-30 RX ORDER — HYDROMORPHONE HYDROCHLORIDE 2 MG/ML
1 INJECTION, SOLUTION INTRAMUSCULAR; INTRAVENOUS; SUBCUTANEOUS
Status: DISCONTINUED | OUTPATIENT
Start: 2018-12-30 | End: 2019-01-02

## 2018-12-30 RX ADMIN — HYDROMORPHONE HYDROCHLORIDE 1 MG: 2 INJECTION, SOLUTION INTRAMUSCULAR; INTRAVENOUS; SUBCUTANEOUS at 11:51

## 2018-12-30 RX ADMIN — HYDROCODONE BITARTRATE AND ACETAMINOPHEN 1 TABLET: 10; 325 TABLET ORAL at 15:50

## 2018-12-30 RX ADMIN — PANTOPRAZOLE SODIUM 40 MG: 40 TABLET, DELAYED RELEASE ORAL at 09:14

## 2018-12-30 RX ADMIN — ONDANSETRON 4 MG: 2 INJECTION INTRAMUSCULAR; INTRAVENOUS at 05:45

## 2018-12-30 RX ADMIN — DIPHENHYDRAMINE HYDROCHLORIDE 25 MG: 50 INJECTION, SOLUTION INTRAMUSCULAR; INTRAVENOUS at 05:45

## 2018-12-30 RX ADMIN — ONDANSETRON 4 MG: 2 INJECTION INTRAMUSCULAR; INTRAVENOUS at 23:54

## 2018-12-30 RX ADMIN — HYDROMORPHONE HYDROCHLORIDE 1 MG: 2 INJECTION, SOLUTION INTRAMUSCULAR; INTRAVENOUS; SUBCUTANEOUS at 17:57

## 2018-12-30 RX ADMIN — HYDROMORPHONE HYDROCHLORIDE 1 MG: 2 INJECTION, SOLUTION INTRAMUSCULAR; INTRAVENOUS; SUBCUTANEOUS at 05:45

## 2018-12-30 RX ADMIN — Medication 10 ML: at 05:45

## 2018-12-30 RX ADMIN — DIPHENHYDRAMINE HYDROCHLORIDE 25 MG: 50 INJECTION, SOLUTION INTRAMUSCULAR; INTRAVENOUS at 11:51

## 2018-12-30 RX ADMIN — DEXTROSE MONOHYDRATE, SODIUM CHLORIDE, AND POTASSIUM CHLORIDE 25 ML/HR: 50; 4.5; 1.49 INJECTION, SOLUTION INTRAVENOUS at 14:49

## 2018-12-30 RX ADMIN — HYDROCODONE BITARTRATE AND ACETAMINOPHEN 1 TABLET: 10; 325 TABLET ORAL at 23:52

## 2018-12-30 RX ADMIN — CITALOPRAM HYDROBROMIDE 10 MG: 20 TABLET ORAL at 17:33

## 2018-12-30 RX ADMIN — DIPHENHYDRAMINE HYDROCHLORIDE 25 MG: 50 INJECTION, SOLUTION INTRAMUSCULAR; INTRAVENOUS at 17:55

## 2018-12-30 RX ADMIN — CYCLOBENZAPRINE HYDROCHLORIDE 10 MG: 10 TABLET, FILM COATED ORAL at 20:05

## 2018-12-30 RX ADMIN — Medication 10 ML: at 14:49

## 2018-12-30 RX ADMIN — FOLIC ACID 1 MG: 1 TABLET ORAL at 09:14

## 2018-12-30 RX ADMIN — ONDANSETRON 4 MG: 2 INJECTION INTRAMUSCULAR; INTRAVENOUS at 17:53

## 2018-12-30 RX ADMIN — Medication 10 ML: at 23:52

## 2018-12-30 RX ADMIN — ONDANSETRON 4 MG: 2 INJECTION INTRAMUSCULAR; INTRAVENOUS at 11:51

## 2018-12-30 RX ADMIN — ENOXAPARIN SODIUM 40 MG: 40 INJECTION SUBCUTANEOUS at 09:14

## 2018-12-30 RX ADMIN — DIPHENHYDRAMINE HYDROCHLORIDE 25 MG: 50 INJECTION, SOLUTION INTRAMUSCULAR; INTRAVENOUS at 23:53

## 2018-12-30 RX ADMIN — HYDROCODONE BITARTRATE AND ACETAMINOPHEN 1 TABLET: 10; 325 TABLET ORAL at 02:15

## 2018-12-30 NOTE — PROGRESS NOTES
Oncology Nursing Communication Tool 7:27 AM 
12/30/2018 Bedside shift change report given to Dennis Malik RN (incoming nurse) by Drinda Cockayne, RN (outgoing nurse) on Bennie Jimenez. Report included the following information SBAR, Kardex, MAR and Accordion. Shift Summary: PRN dilaudid x2, norco x2 Issues for physician to address: Oncology Shift Note Admission Date 12/12/2018 Admission Diagnosis Sickle cell crisis ([facility] [address] Code Status Full Code Consults IP CONSULT TO HOSPITALIST 
IP CONSULT TO ONCOLOGY Cardiac Monitoring [x] Yes [] No  
  
Purposeful Hourly Rounding [x] Yes   
Mukund Score Total Score: 1 Mukund score 3 or > [] Bed Alarm [] Avasys [] 1:1 sitter [] Patient refused (Place signed refusal form in chart) Pain Managed [x] Yes [] No  
 Key Pain Meds HYDROcodone-acetaminophen (NORCO)  mg tablet Take 1 Tab by mouth every six (6) hours as needed. Max Daily Amount: 4 Tabs. fentaNYL (DURAGESIC) 75 mcg/hr 1 Patch by TransDERmal route every seventy-two (72) hours. Max Daily Amount: 1 Patch. Influenza Vaccine Received Flu Vaccine for Current Season (usually Sept-March): Yes Oxygen needs? [] Room air Oxygen @  []1L    [x]2L    []3L   []4L    []5L   []6L Use home O2? [] Yes [] No 
Perform O2 challenge test using  smartphrase (.oxygenchallenge) Last bowel movement Last Bowel Movement Date: 12/29/18 
bowel movement Urinary Catheter LDAs Readmission Risk Assessment Tool Score High Risk   
      
 22 Total Score 3 Has Seen PCP in Last 6 Months (Yes=3, No=0) 3 Patient Length of Stay (>5 days = 3) 9 IP Visits Last 12 Months (1-3=4, 4=9, >4=11) 5 Pt. Coverage (Medicare=5 , Medicaid, or Self-Pay=4) 2 Charlson Comorbidity Score (Age + Comorbid Conditions) Criteria that do not apply: . Living with Significant Other. Assisted Living. LTAC. SNF. or  
Rehab Expected Length of Stay 2d 16h Actual Length of Stay 18 Felipe Foster RN

## 2018-12-30 NOTE — PROGRESS NOTES
Bedside and Verbal shift change report given to [facility] (oncoming nurse) by 6 [facility] (offgoing nurse). Report included the following information SBAR, Kardex, MAR and Recent Results.

## 2018-12-30 NOTE — PROGRESS NOTES
General Daily Progress Note Admit Date: 12/12/2018 Subjective:  
 
Patient feels better - hip pain improving Current Facility-Administered Medications Medication Dose Route Frequency  HYDROmorphone (PF) (DILAUDID) injection 1 mg  1 mg IntraVENous Q6H PRN  
 saline peripheral flush soln 10 mL  10 mL InterCATHeter PRN  
 SALINE PERIPHERAL FLUSH Q8H soln 10 mL  10 mL InterCATHeter Q8H  
 dextrose 5% - 0.45% NaCl with KCl 20 mEq/L infusion  25 mL/hr IntraVENous CONTINUOUS  
 diphenhydrAMINE (BENADRYL) injection 25 mg  25 mg IntraVENous Q4H PRN  
 citalopram (CELEXA) tablet 10 mg  10 mg Oral QPM  
 cyclobenzaprine (FLEXERIL) tablet 10 mg  10 mg Oral TID PRN  
 fentaNYL (DURAGESIC) 75 mcg/hr patch 1 Patch  1 Patch TransDERmal Q72H  folic acid (FOLVITE) tablet 1 mg  1 mg Oral DAILY  HYDROcodone-acetaminophen (NORCO)  mg tablet 1 Tab  1 Tab Oral Q6H PRN  pantoprazole (PROTONIX) tablet 40 mg  40 mg Oral DAILY  acetaminophen (TYLENOL) tablet 650 mg  650 mg Oral Q6H PRN  
 ondansetron (ZOFRAN) injection 4 mg  4 mg IntraVENous Q6H PRN  
 docusate sodium (COLACE) capsule 100 mg  100 mg Oral DAILY PRN  
 enoxaparin (LOVENOX) injection 40 mg  40 mg SubCUTAneous Q24H  polyethylene glycol (MIRALAX) packet 17 g  17 g Oral BID PRN Review of Systems A comprehensive review of systems was negative. Objective:  
 
Patient Vitals for the past 24 hrs: 
 BP Temp Pulse Resp SpO2  
12/30/18 0218 130/72 98.5 °F (36.9 °C) 85 16 100 % 12/29/18 2257 118/69 98.5 °F (36.9 °C) 89 15 100 % 12/29/18 1931 122/73 98.5 °F (36.9 °C) 97 18 99 % 12/29/18 1500 113/84 98.2 °F (36.8 °C) 94 18 98 % 12/29/18 1107 117/69 98.3 °F (36.8 °C) 93 19 99 % 12/29/18 0915 128/85  91    
12/29/18 0900 128/85 98.6 °F (37 °C) 91 20 95 % No intake/output data recorded. 12/28 1901 - 12/30 0700 In: 1080 [P.O.:1080] Out: - Physical Exam:  
Visit Vitals /72 (BP 1 Location: Left arm, BP Patient Position: At rest) Pulse 85 Temp 98.5 °F (36.9 °C) Resp 16 Ht 5' 11\" (1.803 m) Wt 169 lb 12.1 oz (77 kg) SpO2 100% Breastfeeding? No  
BMI 23.68 kg/m² General appearance: alert, cooperative, no distress, appears stated age Neck: supple, symmetrical, trachea midline, no adenopathy, thyroid: not enlarged, symmetric, no tenderness/mass/nodules, no carotid bruit and no JVD Lungs: clear to auscultation bilaterally Heart: regular rate and rhythm, S1, S2 normal, no murmur, click, rub or gallop Abdomen: soft, non-tender. Bowel sounds normal. No masses,  no organomegaly Data Review Recent Results (from the past 24 hour(s)) RETICULOCYTE COUNT Collection Time: 12/30/18  2:17 AM  
Result Value Ref Range Reticulocyte count 7.1 (H) 0.7 - 2.1 % Absolute Retic Cnt. 0.1904 (H) 0.0164 - 0.0776 M/ul CBC W/O DIFF Collection Time: 12/30/18  2:17 AM  
Result Value Ref Range WBC 6.8 3.6 - 11.0 K/uL  
 RBC 2.67 (L) 3.80 - 5.20 M/uL HGB 8.1 (L) 11.5 - 16.0 g/dL HCT 24.4 (L) 35.0 - 47.0 % MCV 91.4 80.0 - 99.0 FL  
 MCH 30.3 26.0 - 34.0 PG  
 MCHC 33.2 30.0 - 36.5 g/dL  
 RDW 17.3 (H) 11.5 - 14.5 % PLATELET 185 469 - 769 K/uL MPV 11.3 8.9 - 12.9 FL  
 NRBC 0.6 (H) 0  WBC ABSOLUTE NRBC 0.04 (H) 0.00 - 0.01 K/uL Assessment:  
 
Principal Problem: 
  Sick[facility] crisis ([facility] (5/22/2017) Active Problems: Hypertension (10/3/2014) Depression (10/5/2014) Plan: 1. She feels much better since transfusion. 2.  Will continue to reduce analgesics. 3.  xr hip

## 2018-12-30 NOTE — PROGRESS NOTES
Spiritual Care Assessment/Progress Note [address] 
 
 
NAME: Liza Jc      MRN: [id number] AGE: 66 y.o. SEX: female Judaism Affiliation: Non Baptism  
Language: English  
 
12/30/2018     Total Time (in minutes): 13 Spiritual Assessment begun in [facility] through conversation with: 
  
    [x]Patient        [] Family    [] Friend(s) Reason for Consult: Initial/Spiritual assessment, patient floor Spiritual beliefs: (Please include comment if needed) [x] Identifies with a zay tradition:     
   [x] Supported by a zay community: [address]      
   [] Claims no spiritual orientation:       
   [] Seeking spiritual identity:            
   [] Adheres to an individual form of spirituality:       
   [] Not able to assess:                   
 
    
Identified resources for coping:  
   [x] Prayer                           
   [] Music                  [] Guided Imagery [x] Family/friends                 [] Pet visits [] Devotional reading                         [] Unknown 
   [] Other:                                        
 
 
Interventions offered during this visit: (See comments for more details) Patient Interventions: Affirmation of zay, Affirmation of emotions/emotional suffering, Catharsis/review of pertinent events in supportive environment, Iconic (affirming the presence of God/Higher Power), Initial/Spiritual assessment, patient floor, Prayer (assurance of), Normalization of emotional/spiritual concerns, Judaism beliefs/image of God discussed Plan of Care: 
 
 [x] Support spiritual and/or cultural needs  
 [] Support AMD and/or advance care planning process    
 [] Support grieving process 
 [] Coordinate Rites and/or Rituals  
 [] Coordination with community clergy  [] No spiritual needs identified at this time 
 [] Detailed Plan of Care below (See Comments)  [] Make referral to Music Therapy 
[] Make referral to Pet Therapy    
[] Make referral to Addiction services 
[] Make referral to [facility] 
[] Make referral to Spiritual Care Partner 
[] No future visits requested       
[x] Follow up visits as needed Comments:  Initial visit on Oncology for spiritual assessment. No visitor present. Patient appeared relaxed and reported feeling better today. She added she thinks she is coming along. Ms. Tiana Mckinney has a son who lives out of town and a daughter who is local.  Her mother is also local.  She had no spiritual needs or concerns at this time. She is a member of [address] [place] [address] [employer].  She stays in touch with friends and family by phone. Offered pastoral presence and assurance of prayer. Advised her of  availability. ROGE Damian, [facility],  [facility]  Paging Service  [phone])

## 2018-12-31 LAB
ERYTHROCYTE [DISTWIDTH] IN BLOOD BY AUTOMATED COUNT: 17.2 % (ref 11.5–14.5)
HCT VFR BLD AUTO: 22.5 % (ref 35–47)
HGB BLD-MCNC: 7.5 G/DL (ref 11.5–16)
MCH RBC QN AUTO: 30.4 PG (ref 26–34)
MCHC RBC AUTO-ENTMCNC: 33.3 G/DL (ref 30–36.5)
MCV RBC AUTO: 91.1 FL (ref 80–99)
NRBC # BLD: 0.03 K/UL (ref 0–0.01)
NRBC BLD-RTO: 0.5 PER 100 WBC
PLATELET # BLD AUTO: 240 K/UL (ref 150–400)
PMV BLD AUTO: 11.1 FL (ref 8.9–12.9)
RBC # BLD AUTO: 2.47 M/UL (ref 3.8–5.2)
RETICS # AUTO: 0.15 M/UL (ref 0.02–0.08)
RETICS/RBC NFR AUTO: 6.1 % (ref 0.7–2.1)
WBC # BLD AUTO: 6 K/UL (ref 3.6–11)

## 2018-12-31 PROCEDURE — 74011250636 HC RX REV CODE- 250/636: Performed by: INTERNAL MEDICINE

## 2018-12-31 PROCEDURE — 74011250637 HC RX REV CODE- 250/637: Performed by: INTERNAL MEDICINE

## 2018-12-31 PROCEDURE — 65660000000 HC RM CCU STEPDOWN

## 2018-12-31 PROCEDURE — 36415 COLL VENOUS BLD VENIPUNCTURE: CPT

## 2018-12-31 PROCEDURE — 85045 AUTOMATED RETICULOCYTE COUNT: CPT

## 2018-12-31 PROCEDURE — 85027 COMPLETE CBC AUTOMATED: CPT

## 2018-12-31 RX ADMIN — ONDANSETRON 4 MG: 2 INJECTION INTRAMUSCULAR; INTRAVENOUS at 17:40

## 2018-12-31 RX ADMIN — DIPHENHYDRAMINE HYDROCHLORIDE 25 MG: 50 INJECTION, SOLUTION INTRAMUSCULAR; INTRAVENOUS at 09:44

## 2018-12-31 RX ADMIN — HYDROMORPHONE HYDROCHLORIDE 1 MG: 2 INJECTION, SOLUTION INTRAMUSCULAR; INTRAVENOUS; SUBCUTANEOUS at 09:44

## 2018-12-31 RX ADMIN — HYDROCODONE BITARTRATE AND ACETAMINOPHEN 1 TABLET: 10; 325 TABLET ORAL at 12:40

## 2018-12-31 RX ADMIN — DIPHENHYDRAMINE HYDROCHLORIDE 25 MG: 50 INJECTION, SOLUTION INTRAMUSCULAR; INTRAVENOUS at 06:09

## 2018-12-31 RX ADMIN — CYCLOBENZAPRINE HYDROCHLORIDE 10 MG: 10 TABLET, FILM COATED ORAL at 17:41

## 2018-12-31 RX ADMIN — Medication 10 ML: at 14:00

## 2018-12-31 RX ADMIN — Medication 10 ML: at 06:08

## 2018-12-31 RX ADMIN — HYDROCODONE BITARTRATE AND ACETAMINOPHEN 1 TABLET: 10; 325 TABLET ORAL at 06:09

## 2018-12-31 RX ADMIN — DIPHENHYDRAMINE HYDROCHLORIDE 25 MG: 50 INJECTION, SOLUTION INTRAMUSCULAR; INTRAVENOUS at 17:40

## 2018-12-31 RX ADMIN — CITALOPRAM HYDROBROMIDE 10 MG: 20 TABLET ORAL at 17:40

## 2018-12-31 RX ADMIN — ONDANSETRON 4 MG: 2 INJECTION INTRAMUSCULAR; INTRAVENOUS at 06:08

## 2018-12-31 RX ADMIN — FOLIC ACID 1 MG: 1 TABLET ORAL at 09:44

## 2018-12-31 RX ADMIN — PANTOPRAZOLE SODIUM 40 MG: 40 TABLET, DELAYED RELEASE ORAL at 09:44

## 2018-12-31 RX ADMIN — HYDROMORPHONE HYDROCHLORIDE 1 MG: 2 INJECTION, SOLUTION INTRAMUSCULAR; INTRAVENOUS; SUBCUTANEOUS at 02:07

## 2018-12-31 RX ADMIN — HYDROMORPHONE HYDROCHLORIDE 1 MG: 2 INJECTION, SOLUTION INTRAMUSCULAR; INTRAVENOUS; SUBCUTANEOUS at 17:40

## 2018-12-31 RX ADMIN — ENOXAPARIN SODIUM 40 MG: 40 INJECTION SUBCUTANEOUS at 09:43

## 2018-12-31 RX ADMIN — ONDANSETRON 4 MG: 2 INJECTION INTRAMUSCULAR; INTRAVENOUS at 12:40

## 2018-12-31 RX ADMIN — DIPHENHYDRAMINE HYDROCHLORIDE 25 MG: 50 INJECTION, SOLUTION INTRAMUSCULAR; INTRAVENOUS at 14:51

## 2018-12-31 RX ADMIN — Medication 10 ML: at 23:16

## 2018-12-31 NOTE — ROUTINE PROCESS
Oncology Nursing Communication Tool 6:57 AM 
12/31/2018 Bedside shift change report given to SAMIR Aranda (incoming nurse) by Sherine Pulido (outgoing nurse) on Linda Gonzalez. Report included the following information SBAR, Kardex, MAR and Recent Results. Shift Summary: Moderate relief with pain medication, PRN medications administered Issues for physician to address: pain control Oncology Shift Note Admission Date 12/12/2018 Admission Diagnosis Sickle cell crisis ([facility] Code Status Full Code Consults IP CONSULT TO HOSPITALIST 
IP CONSULT TO ONCOLOGY Cardiac Monitoring [] Yes [] No  
  
Purposeful Hourly Rounding [] Yes   
Mukund Score Total Score: 1 Mukund score 3 or > [] Bed Alarm [] Avasys [] 1:1 sitter [] Patient refused (Place signed refusal form in chart) Pain Managed [] Yes [] No  
 Key Pain Meds HYDROcodone-acetaminophen (NORCO)  mg tablet Take 1 Tab by mouth every six (6) hours as needed. Max Daily Amount: 4 Tabs. fentaNYL (DURAGESIC) 75 mcg/hr 1 Patch by TransDERmal route every seventy-two (72) hours. Max Daily Amount: 1 Patch. Influenza Vaccine Received Flu Vaccine for Current Season (usually Sept-March): Yes Oxygen needs? [] Room air Oxygen @  []1L    []2L    []3L   []4L    []5L   []6L Use home O2? [] Yes [] No 
Perform O2 challenge test using  smartphrase (.oxygenchallenge) Last bowel movement Last Bowel Movement Date: 12/29/18 
bowel movement Urinary Catheter LDAs Readmission Risk Assessment Tool Score High Risk   
      
 22 Total Score 3 Has Seen PCP in Last 6 Months (Yes=3, No=0) 3 Patient Length of Stay (>5 days = 3) 9 IP Visits Last 12 Months (1-3=4, 4=9, >4=11) 5 Pt. Coverage (Medicare=5 , Medicaid, or Self-Pay=4) 2 Charlson Comorbidity Score (Age + Comorbid Conditions) Criteria that do not apply: . Living with Significant Other. Assisted Living. LTAC. SNF. or  
Rehab Expected Length of Stay 2d 16h Actual Length of Stay 19 Ame Burrows

## 2018-12-31 NOTE — PROGRESS NOTES
Oncology Nursing Communication Tool 10:41 AM 
12/31/2018 Bedside shift change report given to , RN (incoming nurse) by Susanna Tejeda RN (outgoing nurse) on [facility]. Report included the following information SBAR, Kardex, Intake/Output, MAR, Accordion and Recent Results. Shift Summary: PRN Dilaudid x2; PRN Norco x1 Issues for physician to address: Oncology Shift Note Admission Date 12/12/2018 Admission Diagnosis Sickle cell crisis ([facility] [address] Code Status Full Code Consults IP CONSULT TO HOSPITALIST 
IP CONSULT TO ONCOLOGY Cardiac Monitoring [x] Yes [] No  
  
Purposeful Hourly Rounding [x] Yes   
Mukund Score Total Score: 1 Mukund score 3 or > [] Bed Alarm [] Avasys [] 1:1 sitter [] Patient refused (Place signed refusal form in chart) Pain Managed [x] Yes [] No  
 Key Pain Meds HYDROcodone-acetaminophen (NORCO)  mg tablet Take 1 Tab by mouth every six (6) hours as needed. Max Daily Amount: 4 Tabs. fentaNYL (DURAGESIC) 75 mcg/hr 1 Patch by TransDERmal route every seventy-two (72) hours. Max Daily Amount: 1 Patch. Influenza Vaccine Received Flu Vaccine for Current Season (usually Sept-March): Yes Oxygen needs? [] Room air Oxygen @  []1L    [x]2L    []3L   []4L    []5L   []6L Use home O2? [] Yes [] No 
Perform O2 challenge test using  smartphrase (.oxygenchallenge) Last bowel movement Last Bowel Movement Date: 12/29/18 
bowel movement Urinary Catheter LDAs Readmission Risk Assessment Tool Score High Risk   
      
 22 Total Score 3 Has Seen PCP in Last 6 Months (Yes=3, No=0) 3 Patient Length of Stay (>5 days = 3) 9 IP Visits Last 12 Months (1-3=4, 4=9, >4=11) 5 Pt. Coverage (Medicare=5 , Medicaid, or Self-Pay=4) 2 Charlson Comorbidity Score (Age + Comorbid Conditions) Criteria that do not apply: . Living with Significant Other. Assisted Living. LTAC. SNF. or  
Rehab Expected Length of Stay 2d 16h Actual Length of Stay 19 Deneen England RN

## 2018-12-31 NOTE — PROGRESS NOTES
Oncology Nursing Communication Tool 7:32 PM 
12/30/2018 Bedside shift change report given to Paula Rawls (incoming nurse) by Dain Bowden RN (outgoing nurse) on Ileene First. Report included the following information SBAR. Shift Summary: pain control Issues for physician to address: Oncology Shift Note Admission Date 12/12/2018 Admission Diagnosis Sickle cell crisis ([address] Code Status Full Code Consults IP CONSULT TO HOSPITALIST 
IP CONSULT TO ONCOLOGY Cardiac Monitoring [] Yes [] No  
  
Purposeful Hourly Rounding [x] Yes   
Mukund Score Total Score: 1 Mukund score 3 or > [] Bed Alarm [] Avasys [] 1:1 sitter [] Patient refused (Place signed refusal form in chart) Pain Managed [x] Yes [] No  
 Key Pain Meds HYDROcodone-acetaminophen (NORCO)  mg tablet Take 1 Tab by mouth every six (6) hours as needed. Max Daily Amount: 4 Tabs. fentaNYL (DURAGESIC) 75 mcg/hr 1 Patch by TransDERmal route every seventy-two (72) hours. Max Daily Amount: 1 Patch. Influenza Vaccine Received Flu Vaccine for Current Season (usually Sept-March): Yes Oxygen needs? [] Room air Oxygen @  []1L    []2L    []3L   []4L    []5L   []6L Use home O2? [] Yes [] No 
Perform O2 challenge test using  smartphrase (.oxygenchallenge) Last bowel movement Last Bowel Movement Date: 12/29/18 
bowel movement Urinary Catheter LDAs Readmission Risk Assessment Tool Score High Risk   
      
 22 Total Score 3 Has Seen PCP in Last 6 Months (Yes=3, No=0) 3 Patient Length of Stay (>5 days = 3) 9 IP Visits Last 12 Months (1-3=4, 4=9, >4=11) 5 Pt. Coverage (Medicare=5 , Medicaid, or Self-Pay=4) 2 Charlson Comorbidity Score (Age + Comorbid Conditions) Criteria that do not apply:  
 . Living with Significant Other. Assisted Living. LTAC. SNF. or  
Rehab Expected Length of Stay 2d 16h Actual Length of Stay 18 Bruno Antony, RN

## 2018-12-31 NOTE — PROGRESS NOTES
General Daily Progress Note Admit Date: 12/12/2018 Subjective:  
 
Patient continues to complain of pain. Current Facility-Administered Medications Medication Dose Route Frequency  HYDROmorphone (PF) (DILAUDID) injection 1 mg  1 mg IntraVENous Q8H PRN  
 saline peripheral flush soln 10 mL  10 mL InterCATHeter PRN  
 SALINE PERIPHERAL FLUSH Q8H soln 10 mL  10 mL InterCATHeter Q8H  
 dextrose 5% - 0.45% NaCl with KCl 20 mEq/L infusion  25 mL/hr IntraVENous CONTINUOUS  
 diphenhydrAMINE (BENADRYL) injection 25 mg  25 mg IntraVENous Q4H PRN  
 citalopram (CELEXA) tablet 10 mg  10 mg Oral QPM  
 cyclobenzaprine (FLEXERIL) tablet 10 mg  10 mg Oral TID PRN  
 fentaNYL (DURAGESIC) 75 mcg/hr patch 1 Patch  1 Patch TransDERmal Q72H  folic acid (FOLVITE) tablet 1 mg  1 mg Oral DAILY  HYDROcodone-acetaminophen (NORCO)  mg tablet 1 Tab  1 Tab Oral Q6H PRN  pantoprazole (PROTONIX) tablet 40 mg  40 mg Oral DAILY  acetaminophen (TYLENOL) tablet 650 mg  650 mg Oral Q6H PRN  
 ondansetron (ZOFRAN) injection 4 mg  4 mg IntraVENous Q6H PRN  
 docusate sodium (COLACE) capsule 100 mg  100 mg Oral DAILY PRN  
 enoxaparin (LOVENOX) injection 40 mg  40 mg SubCUTAneous Q24H  polyethylene glycol (MIRALAX) packet 17 g  17 g Oral BID PRN Review of Systems A comprehensive review of systems was negative. Objective:  
 
Patient Vitals for the past 24 hrs: 
 BP Temp Pulse Resp SpO2  
12/31/18 0723 98/41 98.2 °F (36.8 °C) 89 18 98 % 12/31/18 0321 99/75 98.6 °F (37 °C) 89 18 98 % 12/31/18 0014 124/65 98.7 °F (37.1 °C) 89 18 99 % 12/30/18 1959 125/72 99.5 °F (37.5 °C) 91 16 99 % 12/30/18 1449 119/67 99.1 °F (37.3 °C) 90 16 98 % 12/30/18 1135 114/59 98.9 °F (37.2 °C) 92 16 99 % No intake/output data recorded. 12/29 1901 - 12/31 0700 In: 25247.3 [I.V.:20592.3] Out: - Physical Exam:  
Visit Vitals BP 98/41 (BP 1 Location: Left arm, BP Patient Position: At rest) Pulse 89 Temp 98.2 °F (36.8 °C) Resp 18 Ht 5' 11\" (1.803 m) Wt 169 lb 12.1 oz (77 kg) SpO2 98% Breastfeeding? No  
BMI 23.68 kg/m² General appearance: alert, cooperative, no distress, appears stated age Neck: supple, symmetrical, trachea midline, no adenopathy, thyroid: not enlarged, symmetric, no tenderness/mass/nodules, no carotid bruit and no JVD Lungs: clear to auscultation bilaterally Heart: regular rate and rhythm, S1, S2 normal, no murmur, click, rub or gallop Abdomen: soft, non-tender. Bowel sounds normal. No masses,  no organomegaly, normal findings Extremities: extremities normal, atraumatic, no cyanosis or edema Data Review Recent Results (from the past 24 hour(s)) CBC W/O DIFF Collection Time: 12/31/18  2:12 AM  
Result Value Ref Range WBC 6.0 3.6 - 11.0 K/uL  
 RBC 2.47 (L) 3.80 - 5.20 M/uL HGB 7.5 (L) 11.5 - 16.0 g/dL HCT 22.5 (L) 35.0 - 47.0 % MCV 91.1 80.0 - 99.0 FL  
 MCH 30.4 26.0 - 34.0 PG  
 MCHC 33.3 30.0 - 36.5 g/dL  
 RDW 17.2 (H) 11.5 - 14.5 % PLATELET 742 111 - 346 K/uL MPV 11.1 8.9 - 12.9 FL  
 NRBC 0.5 (H) 0  WBC ABSOLUTE NRBC 0.03 (H) 0.00 - 0.01 K/uL RETICULOCYTE COUNT Collection Time: 12/31/18  2:12 AM  
Result Value Ref Range Reticulocyte count 6.1 (H) 0.7 - 2.1 % Absolute Retic Cnt. 0.1507 (H) 0.0164 - 0.0776 M/ul Assessment:  
 
Principal Problem: 
  Sickle cell crisis ([facility] [address] (5/22/2017) Active Problems: Hypertension (10/3/2014) Depression (10/5/2014) Plan: 1. Continued pain but improving. Plan discharge on Wednesday for all goes well. 2.  Note made of CBC results.

## 2018-12-31 NOTE — PROGRESS NOTES
Nutrition Assessment: 
 
INTERVENTIONS/RECOMMENDATIONS:  
Continue current diet ASSESSMENT:  
Chart reviewed. Pt reports continued good appetite and PO intake. She did not have any nutrition questions during visit. Diet Order: Regular 
% Eaten:   
Patient Vitals for the past 72 hrs: 
 % Diet Eaten 12/29/18 1312 100 % 12/29/18 0929 100 % Pertinent Medications: [x]Reviewed:  
Pertinent Labs: [x]Reviewed:  
Food Allergies: [x]NKFA  []Other Last BM:  12/29 Edema:      []RUE   []LUE   [x]RLE 1+  [x]LLE 1+ Pressure Ulcer:      [] Stage I   [] Stage II   [] Stage III   [] Stage IV Anthropometrics: Height: 5' 11\" (180.3 cm) Weight: 77 kg (169 lb 12.1 oz) IBW (%IBW):   ( ) UBW (%UBW):   (  %) BMI: Body mass index is 23.68 kg/m². This BMI is indicative of: 
[]Underweight   [x]Normal   []Overweight   [] Obesity   [] Extreme Obesity (BMI>40) Last Weight Metrics: 
Weight Loss Metrics 12/12/2018 11/13/2018 10/29/2018 10/2/2018 9/24/2018 9/8/2018 9/7/2018 Today's Wt 169 lb 12.1 oz 197 lb 1.6 oz 170 lb 10.2 oz 201 lb 12.8 oz 190 lb 3.2 oz - 185 lb BMI 23.68 kg/m2 27.49 kg/m2 23.8 kg/m2 28.15 kg/m2 26.53 kg/m2 25.8 kg/m2 -  
 
 
Estimated Nutrition Needs (Based on): 4004 Kcals/day(BMR: 1400 x 1.3) , 60 g(0.8 g/kg) Protein Carbohydrate: At Least 130 g/day  Fluids: 1825 mL/day or per primary team 
 
NUTRITION DIAGNOSES:  
Problem:  No nutritional diagnosis at this time Etiology: related to Signs/Symptoms: as evidenced by     
Previous Nutrition Dx:  [] Resolved  [] Unresolved           [] Progressing NUTRITION INTERVENTIONS: 
Meals/Snacks: General/healthful diet GOAL:  
consume >75% of meals in 5-7 days NUTRITION MONITORING AND EVALUATION Behavioral-Environmental Outcomes: Readiness to change Physical Signs/Symptoms Outcomes: Weight/weight change, Electrolyte and renal profile, GI 
 
Previous Goal Met: 
 [x] Met              [] Progressing Towards Goal              [] Not Progressing Towards Goal  
Previous Recommendations: 
 [] Implemented          [] Not Implemented          [x] Not Applicable LEARNING NEEDS (Diet, Food/Nutrient-Drug Interaction):  
 [x] None Identified 
 [] Identified and Education Provided/Documented 
 [] Identified and Pt declined/was not appropriate Cultural, Hoahaoism, OR Ethnic Dietary Needs:  
 [x] None Identified 
 [] Identified and Addressed 
 
 [x] Interdisciplinary Care Plan Reviewed/Documented  
 [x] Discharge Planning: general healthy diet 
 [] Participated in Interdisciplinary Rounds NUTRITION RISK:  
 [] High              [] Moderate           []  Low  [x]  Minimal/Uncompromised Genesis Anderson RDN Pager [phone] Weekend Pager [phone]

## 2018-12-31 NOTE — PROGRESS NOTES
Hematology Oncology Progress Note Follow up for: SC crisis Chart notes reviewed since last visit. Case discussed with following: nursing staff. Patient complains of the following: pain in hips primarily - improving slowly. Says is classical for her SS crisis and is improving. Additional concerns noted by the staff:  
 
Patient Vitals for the past 24 hrs: 
 BP Temp Pulse Resp SpO2  
12/31/18 0723 98/41 98.2 °F (36.8 °C) 89 18 98 % 12/31/18 0321 99/75 98.6 °F (37 °C) 89 18 98 % 12/31/18 0014 124/65 98.7 °F (37.1 °C) 89 18 99 % 12/30/18 1959 125/72 99.5 °F (37.5 °C) 91 16 99 % 12/30/18 1449 119/67 99.1 °F (37.3 °C) 90 16 98 % 12/30/18 1135 114/59 98.9 °F (37.2 °C) 92 16 99 % ROS negative at present for 11 organ systems except as noted. Physical Examination: 
Constitutional Alert, cooperative, oriented. Mood and affect appropriate. Appears close to chronological age. Well nourished. Well developed. Head Normocephalic; no scars Eyes Conjunctivae and sclerae are clear and without icterus. Pupils are round ENMT Sinuses are nontender. No oral exudates, ulcers, masses, thrush or mucositis. Oropharynx clear. Tongue normal.  
Neck Supple without masses or thyromegaly. No jugular venous distension. Hematologic/Lymphatic No petechiae or purpura. No tender or palpable lymph nodes noted Respiratory Lungs are clear to auscultation without rhonchi or wheezing. Cardiovascular Regular rate and rhythm of heart without murmurs, gallops or rubs. Chest / Line Site Chest is symmetric with no chest wall deformities. Abdomen Non-tender, non-distended, no masses, ascites or hepatosplenomegaly. Good bowel sounds. Musculoskeletal No tenderness or swelling, normal range of motion without obvious weakness. Extremities No visible deformities, no cyanosis, clubbing or edema. Skin No rashes, scars, or lesions suggestive of malignancy. No petechiae, purpura, or ecchymoses. No excoriations. Neurologic No sensory or motor deficits noted but not specifically tested. Psychiatric Alert and oriented. Coherent speech. Verbalizes understanding of our discussions today. Labs: 
Recent Results (from the past 24 hour(s)) CBC W/O DIFF Collection Time: 12/31/18  2:12 AM  
Result Value Ref Range WBC 6.0 3.6 - 11.0 K/uL  
 RBC 2.47 (L) 3.80 - 5.20 M/uL HGB 7.5 (L) 11.5 - 16.0 g/dL HCT 22.5 (L) 35.0 - 47.0 % MCV 91.1 80.0 - 99.0 FL  
 MCH 30.4 26.0 - 34.0 PG  
 MCHC 33.3 30.0 - 36.5 g/dL  
 RDW 17.2 (H) 11.5 - 14.5 % PLATELET 689 950 - 933 K/uL MPV 11.1 8.9 - 12.9 FL  
 NRBC 0.5 (H) 0  WBC ABSOLUTE NRBC 0.03 (H) 0.00 - 0.01 K/uL RETICULOCYTE COUNT Collection Time: 12/31/18  2:12 AM  
Result Value Ref Range Reticulocyte count 6.1 (H) 0.7 - 2.1 % Absolute Retic Cnt. 0.1507 (H) 0.0164 - 0.0776 M/ul Assessment and Plan:  
SC crisis - she appears to be doing fairly well. The pain appears to be fairly well controlled on her current doses of duragesic patch, dilaudid and hydrocodone. She is in agreement with Dr. Keith Alexandre that likely to be stable enough on Wednesday for discharge. BM yesterday. Will need to see if tolerates oxygen coming off (tomorrow?)

## 2019-01-01 LAB
RETICS # AUTO: 0.12 M/UL (ref 0.02–0.08)
RETICS/RBC NFR AUTO: 4.6 % (ref 0.7–2.1)

## 2019-01-01 PROCEDURE — 36415 COLL VENOUS BLD VENIPUNCTURE: CPT

## 2019-01-01 PROCEDURE — 74011250636 HC RX REV CODE- 250/636: Performed by: INTERNAL MEDICINE

## 2019-01-01 PROCEDURE — 85045 AUTOMATED RETICULOCYTE COUNT: CPT

## 2019-01-01 PROCEDURE — 77010033678 HC OXYGEN DAILY

## 2019-01-01 PROCEDURE — 65660000000 HC RM CCU STEPDOWN

## 2019-01-01 PROCEDURE — 94760 N-INVAS EAR/PLS OXIMETRY 1: CPT

## 2019-01-01 PROCEDURE — 74011250637 HC RX REV CODE- 250/637: Performed by: INTERNAL MEDICINE

## 2019-01-01 RX ADMIN — PANTOPRAZOLE SODIUM 40 MG: 40 TABLET, DELAYED RELEASE ORAL at 09:14

## 2019-01-01 RX ADMIN — Medication 10 ML: at 09:14

## 2019-01-01 RX ADMIN — Medication 10 ML: at 22:00

## 2019-01-01 RX ADMIN — DIPHENHYDRAMINE HYDROCHLORIDE 25 MG: 50 INJECTION, SOLUTION INTRAMUSCULAR; INTRAVENOUS at 12:37

## 2019-01-01 RX ADMIN — ENOXAPARIN SODIUM 40 MG: 40 INJECTION SUBCUTANEOUS at 09:14

## 2019-01-01 RX ADMIN — DIPHENHYDRAMINE HYDROCHLORIDE 25 MG: 50 INJECTION, SOLUTION INTRAMUSCULAR; INTRAVENOUS at 04:26

## 2019-01-01 RX ADMIN — Medication 10 ML: at 13:57

## 2019-01-01 RX ADMIN — DIPHENHYDRAMINE HYDROCHLORIDE 25 MG: 50 INJECTION, SOLUTION INTRAMUSCULAR; INTRAVENOUS at 20:29

## 2019-01-01 RX ADMIN — FOLIC ACID 1 MG: 1 TABLET ORAL at 09:14

## 2019-01-01 RX ADMIN — CITALOPRAM HYDROBROMIDE 10 MG: 20 TABLET ORAL at 18:11

## 2019-01-01 RX ADMIN — ONDANSETRON 4 MG: 2 INJECTION INTRAMUSCULAR; INTRAVENOUS at 13:57

## 2019-01-01 RX ADMIN — HYDROMORPHONE HYDROCHLORIDE 1 MG: 2 INJECTION, SOLUTION INTRAMUSCULAR; INTRAVENOUS; SUBCUTANEOUS at 12:37

## 2019-01-01 RX ADMIN — HYDROMORPHONE HYDROCHLORIDE 1 MG: 2 INJECTION, SOLUTION INTRAMUSCULAR; INTRAVENOUS; SUBCUTANEOUS at 20:29

## 2019-01-01 RX ADMIN — HYDROMORPHONE HYDROCHLORIDE 1 MG: 2 INJECTION, SOLUTION INTRAMUSCULAR; INTRAVENOUS; SUBCUTANEOUS at 04:26

## 2019-01-01 RX ADMIN — DEXTROSE MONOHYDRATE, SODIUM CHLORIDE, AND POTASSIUM CHLORIDE 25 ML/HR: 50; 4.5; 1.49 INJECTION, SOLUTION INTRAVENOUS at 04:26

## 2019-01-01 RX ADMIN — ONDANSETRON 4 MG: 2 INJECTION INTRAMUSCULAR; INTRAVENOUS at 22:44

## 2019-01-01 NOTE — PROGRESS NOTES
General Daily Progress Note Admit Date: 12/12/2018 Subjective:  
 
Patient continues to complain of pain, but feeling better Current Facility-Administered Medications Medication Dose Route Frequency  HYDROmorphone (PF) (DILAUDID) injection 1 mg  1 mg IntraVENous Q8H PRN  
 saline peripheral flush soln 10 mL  10 mL InterCATHeter PRN  
 SALINE PERIPHERAL FLUSH Q8H soln 10 mL  10 mL InterCATHeter Q8H  
 dextrose 5% - 0.45% NaCl with KCl 20 mEq/L infusion  25 mL/hr IntraVENous CONTINUOUS  
 diphenhydrAMINE (BENADRYL) injection 25 mg  25 mg IntraVENous Q4H PRN  
 citalopram (CELEXA) tablet 10 mg  10 mg Oral QPM  
 cyclobenzaprine (FLEXERIL) tablet 10 mg  10 mg Oral TID PRN  
 fentaNYL (DURAGESIC) 75 mcg/hr patch 1 Patch  1 Patch TransDERmal Q72H  folic acid (FOLVITE) tablet 1 mg  1 mg Oral DAILY  HYDROcodone-acetaminophen (NORCO)  mg tablet 1 Tab  1 Tab Oral Q6H PRN  pantoprazole (PROTONIX) tablet 40 mg  40 mg Oral DAILY  acetaminophen (TYLENOL) tablet 650 mg  650 mg Oral Q6H PRN  
 ondansetron (ZOFRAN) injection 4 mg  4 mg IntraVENous Q6H PRN  
 docusate sodium (COLACE) capsule 100 mg  100 mg Oral DAILY PRN  
 enoxaparin (LOVENOX) injection 40 mg  40 mg SubCUTAneous Q24H  polyethylene glycol (MIRALAX) packet 17 g  17 g Oral BID PRN Review of Systems A comprehensive review of systems was negative. Objective:  
 
Patient Vitals for the past 24 hrs: 
 BP Temp Pulse Resp SpO2  
01/01/19 1200 123/70 98.3 °F (36.8 °C) 91 18 100 % 01/01/19 0800 123/73 98.4 °F (36.9 °C) 88 18 100 % 01/01/19 0315 120/56 98.2 °F (36.8 °C) 91 16 98 % 12/31/18 2258 116/65 98.2 °F (36.8 °C) 85 18 99 % 12/31/18 1957 111/71 98.8 °F (37.1 °C) 81 18 98 % No intake/output data recorded. 12/30 1901 - 01/01 0700 In: 34411.3 [I.V.:96763.3] Out: - Physical Exam:  
Visit Vitals /70 (BP 1 Location: Left arm, BP Patient Position: At rest) Pulse 91 Temp 98.3 °F (36.8 °C) Resp 18 Ht 5' 11\" (1.803 m) Wt 169 lb 12.1 oz (77 kg) SpO2 100% Breastfeeding? No  
BMI 23.68 kg/m² General appearance: alert, cooperative, no distress, appears stated age Neck: supple, symmetrical, trachea midline, no adenopathy, thyroid: not enlarged, symmetric, no tenderness/mass/nodules, no carotid bruit and no JVD Lungs: clear to auscultation bilaterally Heart: regular rate and rhythm, S1, S2 normal, no murmur, click, rub or gallop Abdomen: soft, non-tender. Bowel sounds normal. No masses,  no organomegaly, normal findings Extremities: extremities normal, atraumatic, no cyanosis or edema Data Review Recent Results (from the past 24 hour(s)) RETICULOCYTE COUNT Collection Time: 01/01/19  4:08 AM  
Result Value Ref Range Reticulocyte count 4.6 (H) 0.7 - 2.1 % Absolute Retic Cnt. 0.1249 (H) 0.0164 - 0.0776 M/ul Assessment:  
 
Principal Problem: 
  Sickle cell crisis ([facility] [address] (5/22/2017) Active Problems: Hypertension (10/3/2014) Depression (10/5/2014) Plan: 1. Continued pain but improving. 2.   Plan discharge on Wednesday for all goes well.

## 2019-01-01 NOTE — PROGRESS NOTES
Bedside and Verbal shift change report given to Maryanne Castillo RN (oncoming nurse) by Kenna Serrano RN (offgoing nurse). Report included the following information SBAR, Kardex, Intake/Output, MAR and Recent Results.

## 2019-01-01 NOTE — PROGRESS NOTES
New Years Day CM completed chart review. Pt is Sickle Cell Patient and still weaning pain meds. Dr. Katia Zhou note from yesterday indicated DC on 1/2/19 if all goes well. CM will continue to monitor discharge plan. Huang Samuel, KELVIN Ext [phone]

## 2019-01-02 LAB
RETICS # AUTO: 0.08 M/UL (ref 0.02–0.08)
RETICS/RBC NFR AUTO: 3.1 % (ref 0.7–2.1)

## 2019-01-02 PROCEDURE — 74011250636 HC RX REV CODE- 250/636: Performed by: INTERNAL MEDICINE

## 2019-01-02 PROCEDURE — 85045 AUTOMATED RETICULOCYTE COUNT: CPT

## 2019-01-02 PROCEDURE — 36415 COLL VENOUS BLD VENIPUNCTURE: CPT

## 2019-01-02 PROCEDURE — 74011250637 HC RX REV CODE- 250/637: Performed by: INTERNAL MEDICINE

## 2019-01-02 PROCEDURE — 94760 N-INVAS EAR/PLS OXIMETRY 1: CPT

## 2019-01-02 PROCEDURE — 65660000000 HC RM CCU STEPDOWN

## 2019-01-02 PROCEDURE — 77010033678 HC OXYGEN DAILY

## 2019-01-02 RX ADMIN — DIPHENHYDRAMINE HYDROCHLORIDE 25 MG: 50 INJECTION, SOLUTION INTRAMUSCULAR; INTRAVENOUS at 04:34

## 2019-01-02 RX ADMIN — FOLIC ACID 1 MG: 1 TABLET ORAL at 09:12

## 2019-01-02 RX ADMIN — DIPHENHYDRAMINE HYDROCHLORIDE 25 MG: 50 INJECTION, SOLUTION INTRAMUSCULAR; INTRAVENOUS at 09:45

## 2019-01-02 RX ADMIN — PANTOPRAZOLE SODIUM 40 MG: 40 TABLET, DELAYED RELEASE ORAL at 09:12

## 2019-01-02 RX ADMIN — Medication 10 ML: at 09:46

## 2019-01-02 RX ADMIN — ONDANSETRON 4 MG: 2 INJECTION INTRAMUSCULAR; INTRAVENOUS at 18:27

## 2019-01-02 RX ADMIN — ENOXAPARIN SODIUM 40 MG: 40 INJECTION SUBCUTANEOUS at 09:11

## 2019-01-02 RX ADMIN — Medication 10 ML: at 21:51

## 2019-01-02 RX ADMIN — DIPHENHYDRAMINE HYDROCHLORIDE 25 MG: 50 INJECTION, SOLUTION INTRAMUSCULAR; INTRAVENOUS at 17:02

## 2019-01-02 RX ADMIN — HYDROCODONE BITARTRATE AND ACETAMINOPHEN 1 TABLET: 10; 325 TABLET ORAL at 23:11

## 2019-01-02 RX ADMIN — HYDROMORPHONE HYDROCHLORIDE 1 MG: 2 INJECTION, SOLUTION INTRAMUSCULAR; INTRAVENOUS; SUBCUTANEOUS at 04:34

## 2019-01-02 RX ADMIN — Medication 10 ML: at 11:08

## 2019-01-02 RX ADMIN — Medication 10 ML: at 18:28

## 2019-01-02 RX ADMIN — DIPHENHYDRAMINE HYDROCHLORIDE 25 MG: 50 INJECTION, SOLUTION INTRAMUSCULAR; INTRAVENOUS at 21:50

## 2019-01-02 RX ADMIN — Medication 10 ML: at 06:58

## 2019-01-02 RX ADMIN — HYDROCODONE BITARTRATE AND ACETAMINOPHEN 1 TABLET: 10; 325 TABLET ORAL at 17:02

## 2019-01-02 RX ADMIN — Medication 10 ML: at 16:47

## 2019-01-02 RX ADMIN — CITALOPRAM HYDROBROMIDE 10 MG: 20 TABLET ORAL at 17:03

## 2019-01-02 RX ADMIN — ONDANSETRON 4 MG: 2 INJECTION INTRAMUSCULAR; INTRAVENOUS at 09:12

## 2019-01-02 RX ADMIN — HYDROCODONE BITARTRATE AND ACETAMINOPHEN 1 TABLET: 10; 325 TABLET ORAL at 09:12

## 2019-01-02 NOTE — PROGRESS NOTES
Patient up more this evening. She rested all day in the bed. Did an 02 challenge on her and she passed. She request pain medication and nausea medication through the day. Tolerated diet

## 2019-01-02 NOTE — PROGRESS NOTES
Hematology Oncology Progress Note Follow up for: SC crisis Chart notes reviewed since last visit. Case discussed with following:  
 
Patient complains of the following: pain in hips primarily - better but not gone. Hopes to go home tomorrow. Daughter staying with her now so she will have assistance as needed. Not constipated. Additional concerns noted by the staff:  
 
Patient Vitals for the past 24 hrs: 
 BP Temp Pulse Resp SpO2  
01/02/19 0811 128/67 98.5 °F (36.9 °C) 88 18 97 % 01/02/19 0237 113/64 98.3 °F (36.8 °C) 88 18 96 % 01/01/19 2237 100/51 99.6 °F (37.6 °C) 88 18 100 % 01/01/19 1948 143/81 98.6 °F (37 °C) 90 18 100 % 01/01/19 1530 123/66 98.9 °F (37.2 °C) 91 18 100 % 01/01/19 1200 123/70 98.3 °F (36.8 °C) 91 18 100 % ROS negative at present for 11 organ systems except as noted. Physical Examination: 
Constitutional Alert, cooperative, oriented. Mood and affect appropriate. Appears close to chronological age. Well nourished. Well developed. Head Normocephalic; no scars Eyes Conjunctivae and sclerae are clear and without icterus. Pupils are round ENMT Sinuses are nontender. No oral exudates, ulcers, masses, thrush or mucositis. Oropharynx clear. Tongue normal.  
Neck Supple without masses or thyromegaly. No jugular venous distension. Hematologic/Lymphatic No petechiae or purpura. No tender or palpable lymph nodes noted Respiratory Lungs are clear to auscultation without rhonchi or wheezing. Cardiovascular Regular rate and rhythm of heart without murmurs, gallops or rubs. Chest / Line Site Chest is symmetric with no chest wall deformities. Abdomen Non-tender, non-distended, no masses, ascites or hepatosplenomegaly. Good bowel sounds. Musculoskeletal No tenderness or swelling, normal range of motion without obvious weakness. Extremities No visible deformities, no cyanosis, clubbing or edema. Skin No rashes, scars, or lesions suggestive of malignancy. No petechiae, purpura, or ecchymoses. No excoriations. Neurologic No sensory or motor deficits noted but not specifically tested. Psychiatric Alert and oriented. Coherent speech. Verbalizes understanding of our discussions today. Labs: 
Recent Results (from the past 24 hour(s)) RETICULOCYTE COUNT Collection Time: 01/02/19  3:19 AM  
Result Value Ref Range Reticulocyte count 3.1 (H) 0.7 - 2.1 % Absolute Retic Cnt. 0.0794 (H) 0.0164 - 0.0776 M/ul Assessment and Plan:  
SC crisis - she appears to be doing fairly well. The pain appears to be fairly well controlled on her current doses of duragesic patch, dilaudid and hydrocodone. Hoping will be well enough to go home tomorrow. Will need to see if tolerates oxygen coming off so will ask nurses to work on this today.

## 2019-01-02 NOTE — PROGRESS NOTES
CM completed chart review. Noted oncology note. \"SC crisis - she appears to be doing fairly well. The pain appears to be fairly well controlled on her current doses of duragesic patch, dilaudid and hydrocodone. Hoping will be well enough to go home tomorrow. Will need to see if tolerates oxygen coming off so will ask nurses to work on this today. \" 
 
RN if unable to wean oxygen please complete oxygen challenge and document in chart to help justify the need for home oxygen if needed. CM will have to try to get Home Oxygen approved through [employer] if needed tomorrow. CM will continue to monitor discharge plan. Jw Munguia, KELVIN Ext [phone]

## 2019-01-03 VITALS
RESPIRATION RATE: 18 BRPM | OXYGEN SATURATION: 97 % | BODY MASS INDEX: 23.77 KG/M2 | HEIGHT: 71 IN | HEART RATE: 85 BPM | TEMPERATURE: 98.6 F | DIASTOLIC BLOOD PRESSURE: 68 MMHG | SYSTOLIC BLOOD PRESSURE: 125 MMHG | WEIGHT: 169.75 LBS

## 2019-01-03 LAB
RETICS # AUTO: 0.08 M/UL (ref 0.02–0.08)
RETICS/RBC NFR AUTO: 3.2 % (ref 0.7–2.1)

## 2019-01-03 PROCEDURE — 94760 N-INVAS EAR/PLS OXIMETRY 1: CPT

## 2019-01-03 PROCEDURE — 36415 COLL VENOUS BLD VENIPUNCTURE: CPT

## 2019-01-03 PROCEDURE — 74011250636 HC RX REV CODE- 250/636: Performed by: INTERNAL MEDICINE

## 2019-01-03 PROCEDURE — 85045 AUTOMATED RETICULOCYTE COUNT: CPT

## 2019-01-03 PROCEDURE — 74011250637 HC RX REV CODE- 250/637: Performed by: INTERNAL MEDICINE

## 2019-01-03 RX ADMIN — ONDANSETRON 4 MG: 2 INJECTION INTRAMUSCULAR; INTRAVENOUS at 02:37

## 2019-01-03 RX ADMIN — ENOXAPARIN SODIUM 40 MG: 40 INJECTION SUBCUTANEOUS at 08:04

## 2019-01-03 RX ADMIN — FOLIC ACID 1 MG: 1 TABLET ORAL at 08:03

## 2019-01-03 RX ADMIN — HYDROCODONE BITARTRATE AND ACETAMINOPHEN 1 TABLET: 10; 325 TABLET ORAL at 08:04

## 2019-01-03 RX ADMIN — Medication 10 ML: at 05:55

## 2019-01-03 RX ADMIN — PANTOPRAZOLE SODIUM 40 MG: 40 TABLET, DELAYED RELEASE ORAL at 08:03

## 2019-01-03 RX ADMIN — DIPHENHYDRAMINE HYDROCHLORIDE 25 MG: 50 INJECTION, SOLUTION INTRAMUSCULAR; INTRAVENOUS at 02:38

## 2019-01-03 RX ADMIN — DIPHENHYDRAMINE HYDROCHLORIDE 25 MG: 50 INJECTION, SOLUTION INTRAMUSCULAR; INTRAVENOUS at 09:09

## 2019-01-03 NOTE — PROGRESS NOTES
PCP KRISTINA appt scheduled with Dr. Bakari Schulz on 1/10/2019 at 1:15pm. Appt added to AVS. Jonny Michaels CM Specialist

## 2019-01-03 NOTE — PROGRESS NOTES
Oncology Nursing Communication Tool 
7:03 AM 
1/3/2019 Bedside shift change report given to Gareth Howe RN (incoming nurse) by Christine Rodriguez (outgoing nurse) on Fan Smith. Report included the following information SBAR, Kardex, Intake/Output and MAR. Shift Summary: Pt remained stable during shift. Benadryl x2. Pain medication and Zofran x1. Issues for physician to address: Oncology Shift Note Admission Date 12/12/2018 Admission Diagnosis Sickle cell crisis ([facility] [address] Code Status Full Code Consults IP CONSULT TO HOSPITALIST 
IP CONSULT TO ONCOLOGY Cardiac Monitoring [x] Yes [x] No  
  
Purposeful Hourly Rounding [x] Yes   
Mukund Score Total Score: 1 Mukund score 3 or > [] Bed Alarm [] Avasys [] 1:1 sitter [] Patient refused (Place signed refusal form in chart) Pain Managed [x] Yes [] No  
 Key Pain Meds HYDROcodone-acetaminophen (NORCO)  mg tablet Take 1 Tab by mouth every six (6) hours as needed. Max Daily Amount: 4 Tabs. fentaNYL (DURAGESIC) 75 mcg/hr 1 Patch by TransDERmal route every seventy-two (72) hours. Max Daily Amount: 1 Patch. Influenza Vaccine Received Flu Vaccine for Current Season (usually Sept-March): Yes Oxygen needs? [x] Room air Oxygen @  []1L    []2L    []3L   []4L    []5L   []6L Use home O2? [] Yes [x] No 
Perform O2 challenge test using  smartphrase (.oxygenchallenge) Last bowel movement Last Bowel Movement Date: 01/01/19 
bowel movement Urinary Catheter LDAs Readmission Risk Assessment Tool Score High Risk   
      
 22 Total Score 3 Has Seen PCP in Last 6 Months (Yes=3, No=0) 3 Patient Length of Stay (>5 days = 3) 9 IP Visits Last 12 Months (1-3=4, 4=9, >4=11) 5 Pt. Coverage (Medicare=5 , Medicaid, or Self-Pay=4) 2 Charlson Comorbidity Score (Age + Comorbid Conditions) Criteria that do not apply: . Living with Significant Other. Assisted Living. LTAC. SNF. or  
Rehab Expected Length of Stay 2d 16h Actual Length of Stay 22 Mary Jane Dimas

## 2019-01-03 NOTE — DISCHARGE INSTRUCTIONS
General Discharge Instructions    Patient ID:  Reymundo Beck  [id number]  66 y.o.  1960    Patient Instructions        The following personal items were collected during your admission and were returned to you. Take Home Medications           What to do at Home    Recommended diet: Regular Diet    Recommended activity: Activity as tolerated    Follow-up with Betty Gilman MD  in 5 days. Information obtained by :  I understand that if any problems occur once I am at home I am to contact my physician. I understand and acknowledge receipt of the instructions indicated above.                                                                                                                                            Physician's or R.N.'s Signature                                                                  Date/Time                                                                                                                                              Patient or Representative Signature                                                          Date/Time

## 2019-01-03 NOTE — PROGRESS NOTES
Reviewed discharge instructions with patient and patient's, patient was provided a discharge binder and a copy of the discharge instructions, patient new prescription called into pharmacy by MD. I reviewed side effects with patient and patients, patient verbalized understanding,  Patient transported via wheelchair to hold area discharged to home. Midline Removal Note: 
S: Midline removed from GIANNA 
O: Mid line removed from GIANNA after sterile site prepped per protocol. Midline catheter tip visualized and intact. Pressure dressing applied with transparent dressing. A: No redness, ecchymosis, edema, swelling, or drainage noted at site. P: Instructions provided on post Midline discharge care, including followup notification instructions.

## 2019-01-03 NOTE — PROGRESS NOTES
Courtesy note - discharge noted. She reports pain mostly resolved and is happy to be going home. No needs identified. Thanks for allowing me to assist in her care.  Bebe Trivedi MD FACP

## 2019-01-03 NOTE — PROGRESS NOTES
General Daily Progress Note Admit Date: 12/12/2018 Subjective:  
 
Patient has no complaint . Current Facility-Administered Medications Medication Dose Route Frequency  saline peripheral flush soln 10 mL  10 mL InterCATHeter PRN  
 SALINE PERIPHERAL FLUSH Q8H soln 10 mL  10 mL InterCATHeter Q8H  
 diphenhydrAMINE (BENADRYL) injection 25 mg  25 mg IntraVENous Q4H PRN  
 citalopram (CELEXA) tablet 10 mg  10 mg Oral QPM  
 cyclobenzaprine (FLEXERIL) tablet 10 mg  10 mg Oral TID PRN  
 fentaNYL (DURAGESIC) 75 mcg/hr patch 1 Patch  1 Patch TransDERmal Q72H  folic acid (FOLVITE) tablet 1 mg  1 mg Oral DAILY  HYDROcodone-acetaminophen (NORCO)  mg tablet 1 Tab  1 Tab Oral Q6H PRN  pantoprazole (PROTONIX) tablet 40 mg  40 mg Oral DAILY  acetaminophen (TYLENOL) tablet 650 mg  650 mg Oral Q6H PRN  
 ondansetron (ZOFRAN) injection 4 mg  4 mg IntraVENous Q6H PRN  
 docusate sodium (COLACE) capsule 100 mg  100 mg Oral DAILY PRN  
 enoxaparin (LOVENOX) injection 40 mg  40 mg SubCUTAneous Q24H  polyethylene glycol (MIRALAX) packet 17 g  17 g Oral BID PRN Review of Systems A comprehensive review of systems was negative. Objective:  
 
Patient Vitals for the past 24 hrs: 
 BP Temp Pulse Resp SpO2  
01/02/19 1959 118/66 98.7 °F (37.1 °C) 83 19 98 % 01/02/19 1516 119/66 98.5 °F (36.9 °C) 96 18 96 % 01/02/19 1118 123/74 98.5 °F (36.9 °C) 87 18 96 % 01/02/19 0811 128/67 98.5 °F (36.9 °C) 88 18 97 % 01/02/19 0237 113/64 98.3 °F (36.8 °C) 88 18 96 % 01/01/19 2237 100/51 99.6 °F (37.6 °C) 88 18 100 % No intake/output data recorded. 01/01 0701 - 01/02 1900 In: 1386.7 [P.O.:470; I.V.:916.7] Out: - Physical Exam:  
Visit Vitals /66 (BP 1 Location: Left arm, BP Patient Position: At rest) Pulse 83 Temp 98.7 °F (37.1 °C) Resp 19 Ht 5' 11\" (1.803 m) Wt 169 lb 12.1 oz (77 kg) SpO2 98% Breastfeeding? No  
BMI 23.68 kg/m² General appearance: alert, cooperative, no distress, appears stated age Neck: supple, symmetrical, trachea midline, no adenopathy, thyroid: not enlarged, symmetric, no tenderness/mass/nodules, no carotid bruit and no JVD Lungs: clear to auscultation bilaterally Heart: regular rate and rhythm, S1, S2 normal, no murmur, click, rub or gallop Abdomen: soft, non-tender. Bowel sounds normal. No masses,  no organomegaly Extremities: extremities normal, atraumatic, no cyanosis or edema Data Review Recent Results (from the past 24 hour(s)) RETICULOCYTE COUNT Collection Time: 01/02/19  3:19 AM  
Result Value Ref Range Reticulocyte count 3.1 (H) 0.7 - 2.1 % Absolute Retic Cnt. 0.0794 (H) 0.0164 - 0.0776 M/ul Assessment:  
 
Principal Problem: 
  Sickle cell crisis ([facility] [address] (5/22/2017) Active Problems: Hypertension (10/3/2014) Depression (10/5/2014) Plan: 1. Significant improvement in pain. Improvement you will analgesics DCed. Discharge tomorrow if all goes well.

## 2019-01-04 NOTE — DISCHARGE SUMMARY
Ady Rodgers  MR#: [id number]  : 1960  ACCOUNT #: [de-identified]   ADMIT DATE: 2018  DISCHARGE DATE: 2019    HISTORY OF PRESENT ILLNESS:  Patient is a 45-year-old lady who was doing well up until 2 days prior to admission when note was made of increasing pain which was resistant to her current narcotic regimen. The intensity progressed to the point where she was incapacitated and subsequently presented to the emergency room where she was admitted for a painful sickle crisis. She has an SC hemoglobinopathy. PAST MEDICAL HISTORY, SOCIAL HISTORY, REVIEW OF SYSTEMS, FAMILY HISTORY, PHYSICAL EXAMINATION:  Is as in admitting H and P.    LABORATORY VALUES:  Initial hemoglobin was 8.1, white count 5.1 with an MCV of 91.3, platelet count 572 K. Hemoglobin reached a low of 5.5 on  and after transfusion of 2 units of packed cells reached a high of 8.1 and on  was 7.5. Retic counts remained appropriate throughout. Abnormality on a comprehensive profile on admission revealed BUN and creatinine 18 and 1.80 respectively with a bilirubin of 2.3. RADIOGRAPHS:  Chest x-ray negative as was the case with an x-ray of her right hip. CONSULTATIONS:  Consultation obtained with hematology, Dr. Keli Cortez and her group. Comments will be elaborated on in the hospital course. HOSPITAL COURSE:  The patient was hydrated, given O2 and parenteral analgesics. With this, she was extremely slow to improve. She reached a point there was no meaningful improvement and her hemoglobin dropped by about 2 grams. She was reluctant to have transfusions, but  eventually agreed. She received 2 units of packed cells and there was modest improvement in her pain. She continued and was subsequently discharged home relatively asymptomatic. FINAL DIAGNOSES:  1.  Painful sickle crises. 2.  SC hemoglobinopathy. 3.  Primary hypertension.   4. History of depression. 5.  Status post cholecystectomy. OPERATIONS AND PROCEDURES UNDERGONE THIS ADMISSION:  Transfusion of 2 units of packed cells. DISPOSITION:  Patient will be discharged home ambulatory, on a regular diet. DISCHARGE MEDICATIONS:  Include the following:  Celexa 10 mg daily, fentanyl patch 75 mcg every 3 days, folic acid 1 mg daily, hydrocodone/acetaminophen 10/325 one tablet every 6 p.r.n., losartan/HCTZ 100/12.5 q.a.m., omeprazole 20 mg daily. The patient to return to the office in the next 3-5 days. Recommendations will follow as suggested by hematology. KAREL Velez MD       LAB/LN  D: 01/03/2019 22:25     T: 01/04/2019 07:21  JOB #: [id number]  CC:  Jovani Harris MD

## 2019-01-08 RX ORDER — CITALOPRAM 10 MG/1
TABLET ORAL
Qty: 90 TAB | Refills: 3 | Status: SHIPPED | OUTPATIENT
Start: 2019-01-08 | End: 2019-05-09 | Stop reason: SDUPTHER

## 2019-01-10 ENCOUNTER — OFFICE VISIT (OUTPATIENT)
Dept: INTERNAL MEDICINE CLINIC | Age: 59
End: 2019-01-10

## 2019-01-10 ENCOUNTER — PATIENT OUTREACH (OUTPATIENT)
Dept: INTERNAL MEDICINE CLINIC | Age: 59
End: 2019-01-10

## 2019-01-10 VITALS
OXYGEN SATURATION: 98 % | SYSTOLIC BLOOD PRESSURE: 117 MMHG | TEMPERATURE: 98.9 F | RESPIRATION RATE: 16 BRPM | HEIGHT: 71 IN | DIASTOLIC BLOOD PRESSURE: 79 MMHG | WEIGHT: 182.8 LBS | BODY MASS INDEX: 25.59 KG/M2 | HEART RATE: 112 BPM

## 2019-01-10 DIAGNOSIS — D57.00 SICKLE CELL PAIN CRISIS (HCC): Primary | ICD-10-CM

## 2019-01-10 DIAGNOSIS — F32.A DEPRESSION, UNSPECIFIED DEPRESSION TYPE: ICD-10-CM

## 2019-01-10 DIAGNOSIS — Z00.00 WELLNESS EXAMINATION: ICD-10-CM

## 2019-01-10 DIAGNOSIS — Z13.39 SCREENING FOR ALCOHOLISM: ICD-10-CM

## 2019-01-10 DIAGNOSIS — I10 ESSENTIAL HYPERTENSION: ICD-10-CM

## 2019-01-10 DIAGNOSIS — Z13.31 SCREENING FOR DEPRESSION: ICD-10-CM

## 2019-01-10 RX ORDER — FOLIC ACID 1 MG/1
1 TABLET ORAL DAILY
Qty: 30 TAB | Refills: 11 | Status: SHIPPED | OUTPATIENT
Start: 2019-01-10 | End: 2019-05-26 | Stop reason: DRUGHIGH

## 2019-01-10 NOTE — PROGRESS NOTES
[facility] 2018-1/3/2019:  Sickle Cell Crisis Hospital Discharge [facility] documentation Date/Time:  1/10/2019 12:02 PM 
 
Patient was admitted to [facility] on 2018 and discharged on 1/3/2019 for Sickle Cell Crisis. The physician discharge summary was available at the time of outreach. Patient was contacted within  business days of discharge. FINAL DIAGNOSES: 
1.  Painful sickle crises. 2.  SC hemoglobinopathy. 3.  Primary hypertension. 4.  History of depression. 5.  Status post cholecystectomy. Top Challenges reviewed with the provider  
none Method of communication with provider none Inpatient RRAT score: 21 Was this a readmission? no  
Patient stated reason for the readmission: n/a Nurse Navigator (NN) contacted the patient in office to perform post hospital discharge assessment. Verified name and  with patient as identifiers. Provided introduction to self, and explanation of the Nurse Navigator role. Reviewed discharge instructions and red flags with patient who verbalized understanding. Patient given an opportunity to ask questions and does not have any further questions or concerns at this time. The patient agrees to contact the PCP office for questions related to their healthcare. NN provided contact information for future reference. Disease Specific:   none Summary of patient's top problems: 1. PRBC transfusion needed for SS Hgb Home Health orders at discharge: PT, OT, SN, SLP, SW, Personal Care Aide, tele monitoring, [facility], patient refused, resumption of care, prior history with [facility], none Home Health company: none Date of initial visit: n/a Durable Medical Equipment ordered/company: none Durable Medical Equipment received: n/a Barriers to care? none Advance Care Planning:  
Does patient have an Advance Directive:  not on file; education provided Medication(s):  
New Medications at Discharge: none Changed Medications at Discharge: none Discontinued Medications at Discharge: n/a Medication reconciliation was performed with patient by PCP, who verbalizes understanding of administration of home medications. There were no barriers to obtaining medications identified at this time. Referral to Pharm D needed: no  
 
Current Outpatient Medications Medication Sig  
 citalopram (CELEXA) 10 mg tablet TAKE 1 TABLET BY MOUTH EVERY NIGHT AT BEDTIME  losartan-hydroCHLOROthiazide (HYZAAR) 50-12.5 mg per tablet TAKE 1 TABLET BY MOUTH DAILY  HYDROcodone-acetaminophen (NORCO)  mg tablet Take 1 Tab by mouth every six (6) hours as needed. Max Daily Amount: 4 Tabs.  fentaNYL (DURAGESIC) 75 mcg/hr 1 Patch by TransDERmal route every seventy-two (72) hours. Max Daily Amount: 1 Patch.  promethazine (PHENERGAN) 25 mg suppository Insert 1 Suppository into rectum every six (6) hours as needed for Nausea.  folic acid (FOLVITE) 1 mg tablet Take 1 Tab by mouth daily.  OMEPRAZOLE PO Take 20 mg by mouth daily.  fluocinoNIDE (LIDEX) 0.05 % topical cream two (2) times daily as needed.  ondansetron (ZOFRAN ODT) 4 mg disintegrating tablet Take 1 Tab by mouth every eight (8) hours as needed for Nausea. No current facility-administered medications for this visit. There are no discontinued medications. [facility] follow up appointment(s):  
Future Appointments Date Time Provider Waldo Calvo 1/10/2019  1:15 PM Wen Hutton MD [address] [place] [address] Non-[facility] follow up appointment(s): none [facility]:  n/a  
Goals Addressed None Coordinate pain management and to help patient to avoid stiffness before a sickle cell anemia crisis.

## 2019-01-10 NOTE — PROGRESS NOTES
Chief Complaint Patient presents with  Sickle Cell Crisis 1. Have you been to the ER, urgent care clinic since your last visit? Hospitalized since your last visit? Yes When: 12/12/18 Where: [facility]  Reason for visit: sickle cell 2. Have you seen or consulted any other health care providers outside of the [address] [place] [address] since your last visit? Include any pap smears or colon screening.  No

## 2019-01-10 NOTE — PROGRESS NOTES
[address] [place] [address] and Primary Care 
[place] [address] 
[address] [address] Phone:  [phone]  Fax: [phone] Chief Complaint Patient presents with  Sickle Cell Crisis Benny Grayson SUBJECTIVE: 
  Shar Acuna is a 66 y.o. female Comes in for return visit stating she has done well. She has not had any pain since being discharged from the hospital.  She continues her analgesics as needed for now. She is indeed depressed because her daughter will be getting  soon and leaving home. This is the first time she has been in an empty house ever. She has a past history of primary hypertension, as well as depression. Current Outpatient Medications Medication Sig Dispense Refill  folic acid (FOLVITE) 1 mg tablet Take 1 Tab by mouth daily. 30 Tab 11  
 citalopram (CELEXA) 10 mg tablet TAKE 1 TABLET BY MOUTH EVERY NIGHT AT BEDTIME 90 Tab 3  
 losartan-hydroCHLOROthiazide (HYZAAR) 50-12.5 mg per tablet TAKE 1 TABLET BY MOUTH DAILY 90 Tab 3  
 HYDROcodone-acetaminophen (NORCO)  mg tablet Take 1 Tab by mouth every six (6) hours as needed. Max Daily Amount: 4 Tabs. 120 Tab 0  
 fentaNYL (DURAGESIC) 75 mcg/hr 1 Patch by TransDERmal route every seventy-two (72) hours. Max Daily Amount: 1 Patch. 10 Patch 0  
 ondansetron (ZOFRAN ODT) 4 mg disintegrating tablet Take 1 Tab by mouth every eight (8) hours as needed for Nausea. 10 Tab 0  
 OMEPRAZOLE PO Take 20 mg by mouth daily.  fluocinoNIDE (LIDEX) 0.05 % topical cream two (2) times daily as needed.  promethazine (PHENERGAN) 25 mg suppository Insert 1 Suppository into rectum every six (6) hours as needed for Nausea. 12 Suppository 0 Past Medical History:  
Diagnosis Date  Anemia SC hemoglobinopathy  Chronic pain  Depression  Hypertension  Liver disease   
 hepatitis C; pt reports \"cured\"  Sickle cell disease ([facility] [address] Past Surgical History:  
Procedure Laterality Date  BREAST SURGERY PROCEDURE UNLISTED 2 cysts removed from R breast  
 CHEST SURGERY PROCEDURE UNLISTED    
 status post thor for removal of a benign  HX BREAST BIOPSY Right 05/2007  
 negative  HX CHOLECYSTECTOMY  HX ORTHOPAEDIC    
 bony curettage of an ostial myelitis focus Allergies Allergen Reactions  Dilaudid [Hydromorphone (Bulk)] Itching Can tolerate with benadryl and ondansetron  Percocet [Oxycodone-Acetaminophen] Itching REVIEW OF SYSTEMS: 
General: negative for - chills or fever ENT: negative for - headaches, nasal congestion or tinnitus Respiratory: negative for - cough, hemoptysis, shortness of breath or wheezing Cardiovascular : negative for - chest pain, edema, palpitations or shortness of breath Gastrointestinal: negative for - abdominal pain, blood in stools, heartburn or nausea/vomiting Genito-Urinary: no dysuria, trouble voiding, or hematuria Musculoskeletal: negative for - gait disturbance, joint pain, joint stiffness or joint swelling Neurological: no TIA or stroke symptoms Hematologic: no bruises, no bleeding, no swollen glands Integument: no lumps, mole changes, nail changes or rash Endocrine: no malaise/lethargy or unexpected weight changes Social History Socioeconomic History  Marital status:  Spouse name: Not on file  Number of children: 2  
 Years of education: Not on file  Highest education level: Not on file Occupational History  Occupation: disabled---Sickle cell disease Tobacco Use  Smoking status: Never Smoker  Smokeless tobacco: Never Used Substance and Sexual Activity  Alcohol use: No  
 Drug use: No  
 Sexual activity: Yes  
  Partners: Male Family History Problem Relation Age of Onset  Hypertension Mother  Hypertension Father OBJECTIVE: 
 
Visit Vitals /79 Pulse (!) 112 Temp 98.9 °F (37.2 °C) (Oral) Resp 16 Ht 5' 11\" (1.803 m) Wt 182 lb 12.8 oz (82.9 kg) SpO2 98% BMI 25.50 kg/m² CONSTITUTIONAL: well , well nourished, appears age appropriate EYES: perrla, eom intact ENMT:moist mucous membranes, pharynx clear NECK: supple. Thyroid normal 
RESPIRATORY: Chest: clear to ascultation and percussion CARDIOVASCULAR: Heart: regular rate and rhythm GASTROINTESTINAL: Abdomen: soft, bowel sounds active HEMATOLOGIC: no pathological lymph nodes palpated MUSCULOSKELETAL: Extremities: no edema, pulse 1+ INTEGUMENT: No unusual rashes or suspicious skin lesions noted. Nails appear normal. 
NEUROLOGIC: non-focal exam  
MENTAL STATUS: alert and oriented, appropriate affect ASSESSMENT: 
1. Sickle cell pain crisis ([facility] [address] 2. Depression, unspecified depression type 3. Essential hypertension 4. Screening for alcoholism 5. Screening for depression 6. Wellness examination PLAN: 
 
1. Her sickle cell disease appears to be doing reasonably well. She is having minimal pain, but it is quite responsive to her analgesics. She has declined Hydroxyurea usage. 2. Her depression will indeed improve. Her current situation/problem is superimposed on her history of chronic depression. 3. She will continue her antihypertensive medication as prescribed. 4. I encouraged her to remain as physically active as possible. . 
Orders Placed This Encounter  Depression Screen Annual  
 CBC WITH AUTOMATED DIFF  
 folic acid (FOLVITE) 1 mg tablet Follow-up Disposition: Not on File Cee Candelaria MD 
This is the Subsequent Medicare Annual Wellness Exam, performed 12 months or more after the Initial AWV or the last Subsequent AWV I have reviewed the patient's medical history in detail and updated the computerized patient record. History Past Medical History:  
Diagnosis Date  Anemia SC hemoglobinopathy  Chronic pain  Depression  Hypertension  Liver disease hepatitis C; pt reports \"cured\"  Sickle cell disease ([facility] [address] Past Surgical History:  
Procedure Laterality Date  BREAST SURGERY PROCEDURE UNLISTED 2 cysts removed from R breast  
 CHEST SURGERY PROCEDURE UNLISTED    
 status post thor for removal of a benign  HX BREAST BIOPSY Right 05/2007  
 negative  HX CHOLECYSTECTOMY  HX ORTHOPAEDIC    
 bony curettage of an ostial myelitis focus Current Outpatient Medications Medication Sig Dispense Refill  folic acid (FOLVITE) 1 mg tablet Take 1 Tab by mouth daily. 30 Tab 11  
 citalopram (CELEXA) 10 mg tablet TAKE 1 TABLET BY MOUTH EVERY NIGHT AT BEDTIME 90 Tab 3  
 losartan-hydroCHLOROthiazide (HYZAAR) 50-12.5 mg per tablet TAKE 1 TABLET BY MOUTH DAILY 90 Tab 3  
 HYDROcodone-acetaminophen (NORCO)  mg tablet Take 1 Tab by mouth every six (6) hours as needed. Max Daily Amount: 4 Tabs. 120 Tab 0  
 fentaNYL (DURAGESIC) 75 mcg/hr 1 Patch by TransDERmal route every seventy-two (72) hours. Max Daily Amount: 1 Patch. 10 Patch 0  
 ondansetron (ZOFRAN ODT) 4 mg disintegrating tablet Take 1 Tab by mouth every eight (8) hours as needed for Nausea. 10 Tab 0  
 OMEPRAZOLE PO Take 20 mg by mouth daily.  fluocinoNIDE (LIDEX) 0.05 % topical cream two (2) times daily as needed.  promethazine (PHENERGAN) 25 mg suppository Insert 1 Suppository into rectum every six (6) hours as needed for Nausea. 12 Suppository 0 Allergies Allergen Reactions  Dilaudid [Hydromorphone (Bulk)] Itching Can tolerate with benadryl and ondansetron  Percocet [Oxycodone-Acetaminophen] Itching Family History Problem Relation Age of Onset  Hypertension Mother  Hypertension Father Social History Tobacco Use  Smoking status: Never Smoker  Smokeless tobacco: Never Used Substance Use Topics  Alcohol use: No  
 
Patient Active Problem List  
Diagnosis Code  Sickle cell anemia (HCC) D57.1  Hypertension I10  
  Venous insufficiency I87.2  Hepatitis C B19.20  Depression F32.9  Furuncle of axilla L02.429  
 Sickle cell crisis (HCC) D57.00  Pulsatile tinnitus H93. A9  
 Carpal tunnel syndrome of right wrist G56.01  
 Sickle cell pain crisis ([facility] [address] D57.00 Depression Risk Factor Screening: PHQ over the last two weeks 10/3/2014 PHQ Not Done Patient Decline Little interest or pleasure in doing things Not at all Feeling down, depressed, irritable, or hopeless Not at all Total Score PHQ 2 0 Alcohol Risk Factor Screening: You do not drink alcohol or very rarely. Functional Ability and Level of Safety:  
Hearing Loss Hearing is good. Activities of Daily Living The home contains: no safety equipment. Patient does total self care Fall Risk No flowsheet data found. Abuse Screen Patient is not abused Cognitive Screening Evaluation of Cognitive Function: 
Has your family/caregiver stated any concerns about your memory: no 
Normal 
 
Patient Care Team  
Patient Care Team: 
Kat Bergeron MD as PCP - General (Internal Medicine) Osiel Hampton RN as Ambulatory Care Navigator (General Practice) Assessment/Plan Education and counseling provided: 
Are appropriate based on today's review and evaluation Diagnoses and all orders for this visit: 1. Sickle cell pain crisis (HCC) 
-     CBC WITH AUTOMATED DIFF 
-     folic acid (FOLVITE) 1 mg tablet; Take 1 Tab by mouth daily. 2. Depression, unspecified depression type 3. Essential hypertension 4. Screening for alcoholism -     [place] ANNUAL ALCOHOL SCREEN 15 MIN 
 
5. Screening for depression 
-     Ray 70 6. Wellness examination Health Maintenance Due Topic Date Due  Shingrix Vaccine Age 50> (1 of 2) 10/01/2012  PAP AKA CERVICAL CYTOLOGY  04/02/2018  Influenza Age 5 to Adult  08/01/2018  Pneumococcal 19-64 Highest Risk (3 of 3 - PCV13) 10/31/2018

## 2019-01-11 LAB
BASOPHILS # BLD AUTO: 0 X10E3/UL (ref 0–0.2)
BASOPHILS NFR BLD AUTO: 1 %
EOSINOPHIL # BLD AUTO: 0.1 X10E3/UL (ref 0–0.4)
EOSINOPHIL NFR BLD AUTO: 2 %
ERYTHROCYTE [DISTWIDTH] IN BLOOD BY AUTOMATED COUNT: 18 % (ref 12.3–15.4)
HCT VFR BLD AUTO: 29.2 % (ref 34–46.6)
HGB BLD-MCNC: 9.6 G/DL (ref 11.1–15.9)
IMM GRANULOCYTES # BLD AUTO: 0 X10E3/UL (ref 0–0.1)
IMM GRANULOCYTES NFR BLD AUTO: 0 %
LYMPHOCYTES # BLD AUTO: 2 X10E3/UL (ref 0.7–3.1)
LYMPHOCYTES NFR BLD AUTO: 37 %
MCH RBC QN AUTO: 29.6 PG (ref 26.6–33)
MCHC RBC AUTO-ENTMCNC: 32.9 G/DL (ref 31.5–35.7)
MCV RBC AUTO: 90 FL (ref 79–97)
MONOCYTES # BLD AUTO: 0.8 X10E3/UL (ref 0.1–0.9)
MONOCYTES NFR BLD AUTO: 14 %
NEUTROPHILS # BLD AUTO: 2.5 X10E3/UL (ref 1.4–7)
NEUTROPHILS NFR BLD AUTO: 46 %
PLATELET # BLD AUTO: 360 X10E3/UL (ref 150–379)
RBC # BLD AUTO: 3.24 X10E6/UL (ref 3.77–5.28)
WBC # BLD AUTO: 5.5 X10E3/UL (ref 3.4–10.8)

## 2019-01-13 NOTE — PATIENT INSTRUCTIONS
Medicare Wellness Visit, Female The best way to live healthy is to have a lifestyle where you eat a well-balanced diet, exercise regularly, limit alcohol use, and quit all forms of tobacco/nicotine, if applicable. Regular preventive services are another way to keep healthy. Preventive services (vaccines, screening tests, monitoring & exams) can help personalize your care plan, which helps you manage your own care. Screening tests can find health problems at the earliest stages, when they are easiest to treat. [facility] follows the current, evidence-based guidelines published by the [facility] ([facility]) when recommending preventive services for our patients. Because we follow these guidelines, sometimes recommendations change over time as research supports it. (For example, mammograms used to be recommended annually. Even though Medicare will still pay for an annual mammogram, the newer guidelines recommend a mammogram every two years for women of average risk.) Of course, you and your doctor may decide to screen more often for some diseases, based on your risk and your health status. Preventive services for you include: - Medicare offers their members a free annual wellness visit, which is time for you and your primary care provider to discuss and plan for your preventive service needs. Take advantage of this benefit every year! 
-All adults over the age of 72 should receive the recommended pneumonia vaccines. Current USPSTF guidelines recommend a series of two vaccines for the best pneumonia protection.  
-All adults should have a flu vaccine yearly and a tetanus vaccine every 10 years. All adults age 61 and older should receive a shingles vaccine once in their lifetime.   
-A bone mass density test is recommended when a woman turns 65 to screen for osteoporosis. This test is only recommended one time, as a screening. Some providers will use this same test as a disease monitoring tool if you already have osteoporosis. -All adults age 38-68 who are overweight should have a diabetes screening test once every three years.  
-Other screening tests and preventive services for persons with diabetes include: an eye exam to screen for diabetic retinopathy, a kidney function test, a foot exam, and stricter control over your cholesterol.  
-Cardiovascular screening for adults with routine risk involves an electrocardiogram (ECG) at intervals determined by your doctor.  
-Colorectal cancer screenings should be done for adults age 54-65 with no increased risk factors for colorectal cancer. There are a number of acceptable methods of screening for this type of cancer. Each test has its own benefits and drawbacks. Discuss with your doctor what is most appropriate for you during your annual wellness visit. The different tests include: colonoscopy (considered the best screening method), a fecal occult blood test, a fecal DNA test, and sigmoidoscopy. -Breast cancer screenings are recommended every other year for women of normal risk, age 54-69. 
-Cervical cancer screenings for women over age 72 are only recommended with certain risk factors.  
-All adults born between [facility] should be screened once for Hepatitis C. Here is a list of your current Health Maintenance items (your personalized list of preventive services) with a due date: 
Health Maintenance Due Topic Date Due  Shingles Vaccine (1 of 2) 10/01/2012  Cervical Cancer Screening  04/02/2018  Flu Vaccine  08/01/2018  Pneumococcal Vaccine (3 of 3 - PCV13) 10/31/2018

## 2019-01-19 ENCOUNTER — HOSPITAL ENCOUNTER (INPATIENT)
Age: 59
LOS: 13 days | Discharge: HOME | DRG: 812 | End: 2019-02-01
Attending: EMERGENCY MEDICINE | Admitting: INTERNAL MEDICINE
Payer: MEDICARE

## 2019-01-19 ENCOUNTER — APPOINTMENT (OUTPATIENT)
Dept: GENERAL RADIOLOGY | Age: 59
DRG: 812 | End: 2019-01-19
Attending: EMERGENCY MEDICINE
Payer: MEDICARE

## 2019-01-19 DIAGNOSIS — D57.00 SICKLE CELL PAIN CRISIS (HCC): Primary | ICD-10-CM

## 2019-01-19 LAB
ALBUMIN SERPL-MCNC: 4 G/DL (ref 3.5–5)
ALBUMIN/GLOB SERPL: 0.7 {RATIO} (ref 1.1–2.2)
ALP SERPL-CCNC: 122 U/L (ref 45–117)
ALT SERPL-CCNC: 21 U/L (ref 12–78)
ANION GAP SERPL CALC-SCNC: 8 MMOL/L (ref 5–15)
APPEARANCE UR: CLEAR
AST SERPL-CCNC: 30 U/L (ref 15–37)
BACTERIA URNS QL MICRO: NEGATIVE /HPF
BASOPHILS # BLD: 0 K/UL (ref 0–0.1)
BASOPHILS NFR BLD: 0 % (ref 0–1)
BILIRUB SERPL-MCNC: 1 MG/DL (ref 0.2–1)
BILIRUB UR QL: NEGATIVE
BUN SERPL-MCNC: 12 MG/DL (ref 6–20)
BUN/CREAT SERPL: 14 (ref 12–20)
CALCIUM SERPL-MCNC: 9.4 MG/DL (ref 8.5–10.1)
CHLORIDE SERPL-SCNC: 104 MMOL/L (ref 97–108)
CK SERPL-CCNC: 46 U/L (ref 26–192)
CO2 SERPL-SCNC: 25 MMOL/L (ref 21–32)
COLOR UR: ABNORMAL
CREAT SERPL-MCNC: 0.83 MG/DL (ref 0.55–1.02)
DIFFERENTIAL METHOD BLD: ABNORMAL
EOSINOPHIL # BLD: 0.1 K/UL (ref 0–0.4)
EOSINOPHIL NFR BLD: 1 % (ref 0–7)
EPITH CASTS URNS QL MICRO: ABNORMAL /LPF
ERYTHROCYTE [DISTWIDTH] IN BLOOD BY AUTOMATED COUNT: 17.2 % (ref 11.5–14.5)
GLOBULIN SER CALC-MCNC: 6 G/DL (ref 2–4)
GLUCOSE SERPL-MCNC: 87 MG/DL (ref 65–100)
GLUCOSE UR STRIP.AUTO-MCNC: NEGATIVE MG/DL
HCT VFR BLD AUTO: 26.8 % (ref 35–47)
HGB BLD-MCNC: 8.9 G/DL (ref 11.5–16)
HGB UR QL STRIP: NEGATIVE
HYALINE CASTS URNS QL MICRO: ABNORMAL /LPF (ref 0–5)
IMM GRANULOCYTES # BLD AUTO: 0 K/UL (ref 0–0.04)
IMM GRANULOCYTES NFR BLD AUTO: 0 % (ref 0–0.5)
KETONES UR QL STRIP.AUTO: NEGATIVE MG/DL
LEUKOCYTE ESTERASE UR QL STRIP.AUTO: ABNORMAL
LYMPHOCYTES # BLD: 4.7 K/UL (ref 0.8–3.5)
LYMPHOCYTES NFR BLD: 52 % (ref 12–49)
MCH RBC QN AUTO: 29.1 PG (ref 26–34)
MCHC RBC AUTO-ENTMCNC: 33.2 G/DL (ref 30–36.5)
MCV RBC AUTO: 87.6 FL (ref 80–99)
MONOCYTES # BLD: 0.9 K/UL (ref 0–1)
MONOCYTES NFR BLD: 10 % (ref 5–13)
NEUTS SEG # BLD: 3.3 K/UL (ref 1.8–8)
NEUTS SEG NFR BLD: 36 % (ref 32–75)
NITRITE UR QL STRIP.AUTO: NEGATIVE
NRBC # BLD: 0 K/UL (ref 0–0.01)
NRBC BLD-RTO: 0 PER 100 WBC
PH UR STRIP: 6 [PH] (ref 5–8)
PLATELET # BLD AUTO: 216 K/UL (ref 150–400)
PMV BLD AUTO: 11.4 FL (ref 8.9–12.9)
POTASSIUM SERPL-SCNC: 3.3 MMOL/L (ref 3.5–5.1)
PROT SERPL-MCNC: 10 G/DL (ref 6.4–8.2)
PROT UR STRIP-MCNC: NEGATIVE MG/DL
RBC # BLD AUTO: 3.06 M/UL (ref 3.8–5.2)
RBC #/AREA URNS HPF: ABNORMAL /HPF (ref 0–5)
RETICS # AUTO: 0.03 M/UL (ref 0.02–0.08)
RETICS/RBC NFR AUTO: 0.8 % (ref 0.7–2.1)
SODIUM SERPL-SCNC: 137 MMOL/L (ref 136–145)
SP GR UR REFRACTOMETRY: 1.01 (ref 1–1.03)
UA: UC IF INDICATED,UAUC: ABNORMAL
UROBILINOGEN UR QL STRIP.AUTO: 1 EU/DL (ref 0.2–1)
WBC # BLD AUTO: 9.1 K/UL (ref 3.6–11)
WBC URNS QL MICRO: ABNORMAL /HPF (ref 0–4)

## 2019-01-19 PROCEDURE — 85025 COMPLETE CBC W/AUTO DIFF WBC: CPT

## 2019-01-19 PROCEDURE — 96375 TX/PRO/DX INJ NEW DRUG ADDON: CPT

## 2019-01-19 PROCEDURE — 74011250636 HC RX REV CODE- 250/636: Performed by: INTERNAL MEDICINE

## 2019-01-19 PROCEDURE — 81001 URINALYSIS AUTO W/SCOPE: CPT

## 2019-01-19 PROCEDURE — 36415 COLL VENOUS BLD VENIPUNCTURE: CPT

## 2019-01-19 PROCEDURE — 94761 N-INVAS EAR/PLS OXIMETRY MLT: CPT

## 2019-01-19 PROCEDURE — 74011250636 HC RX REV CODE- 250/636: Performed by: EMERGENCY MEDICINE

## 2019-01-19 PROCEDURE — 71045 X-RAY EXAM CHEST 1 VIEW: CPT

## 2019-01-19 PROCEDURE — 85045 AUTOMATED RETICULOCYTE COUNT: CPT

## 2019-01-19 PROCEDURE — 96376 TX/PRO/DX INJ SAME DRUG ADON: CPT

## 2019-01-19 PROCEDURE — 82550 ASSAY OF CK (CPK): CPT

## 2019-01-19 PROCEDURE — 65270000015 HC RM PRIVATE ONCOLOGY

## 2019-01-19 PROCEDURE — 80053 COMPREHEN METABOLIC PANEL: CPT

## 2019-01-19 PROCEDURE — 99285 EMERGENCY DEPT VISIT HI MDM: CPT

## 2019-01-19 PROCEDURE — 96374 THER/PROPH/DIAG INJ IV PUSH: CPT

## 2019-01-19 PROCEDURE — 96361 HYDRATE IV INFUSION ADD-ON: CPT

## 2019-01-19 RX ORDER — HYDROMORPHONE HYDROCHLORIDE 2 MG/ML
1 INJECTION, SOLUTION INTRAMUSCULAR; INTRAVENOUS; SUBCUTANEOUS
Status: DISCONTINUED | OUTPATIENT
Start: 2019-01-19 | End: 2019-01-20

## 2019-01-19 RX ORDER — SODIUM CHLORIDE 9 MG/ML
75 INJECTION, SOLUTION INTRAVENOUS CONTINUOUS
Status: DISCONTINUED | OUTPATIENT
Start: 2019-01-19 | End: 2019-01-20

## 2019-01-19 RX ORDER — ONDANSETRON 2 MG/ML
4 INJECTION INTRAMUSCULAR; INTRAVENOUS
Status: COMPLETED | OUTPATIENT
Start: 2019-01-19 | End: 2019-01-19

## 2019-01-19 RX ORDER — DIPHENHYDRAMINE HCL 25 MG
25 CAPSULE ORAL
Status: DISCONTINUED | OUTPATIENT
Start: 2019-01-19 | End: 2019-01-20

## 2019-01-19 RX ORDER — NALOXONE HYDROCHLORIDE 0.4 MG/ML
0.4 INJECTION, SOLUTION INTRAMUSCULAR; INTRAVENOUS; SUBCUTANEOUS AS NEEDED
Status: DISCONTINUED | OUTPATIENT
Start: 2019-01-19 | End: 2019-02-01 | Stop reason: HOSPADM

## 2019-01-19 RX ORDER — HYDROMORPHONE HYDROCHLORIDE 1 MG/ML
1 INJECTION, SOLUTION INTRAMUSCULAR; INTRAVENOUS; SUBCUTANEOUS
Status: DISCONTINUED | OUTPATIENT
Start: 2019-01-19 | End: 2019-01-19 | Stop reason: SDUPTHER

## 2019-01-19 RX ORDER — HEPARIN SODIUM 5000 [USP'U]/ML
5000 INJECTION, SOLUTION INTRAVENOUS; SUBCUTANEOUS EVERY 8 HOURS
Status: DISCONTINUED | OUTPATIENT
Start: 2019-01-20 | End: 2019-01-22

## 2019-01-19 RX ORDER — DIPHENHYDRAMINE HYDROCHLORIDE 50 MG/ML
25 INJECTION, SOLUTION INTRAMUSCULAR; INTRAVENOUS
Status: COMPLETED | OUTPATIENT
Start: 2019-01-19 | End: 2019-01-19

## 2019-01-19 RX ORDER — FACIAL-BODY WIPES
10 EACH TOPICAL DAILY PRN
Status: DISCONTINUED | OUTPATIENT
Start: 2019-01-19 | End: 2019-02-01 | Stop reason: HOSPADM

## 2019-01-19 RX ORDER — HYDROMORPHONE HYDROCHLORIDE 1 MG/ML
1 INJECTION, SOLUTION INTRAMUSCULAR; INTRAVENOUS; SUBCUTANEOUS
Status: COMPLETED | OUTPATIENT
Start: 2019-01-19 | End: 2019-01-19

## 2019-01-19 RX ORDER — HYDROMORPHONE HYDROCHLORIDE 1 MG/ML
2 INJECTION, SOLUTION INTRAMUSCULAR; INTRAVENOUS; SUBCUTANEOUS ONCE
Status: COMPLETED | OUTPATIENT
Start: 2019-01-19 | End: 2019-01-19

## 2019-01-19 RX ORDER — SODIUM CHLORIDE 0.9 % (FLUSH) 0.9 %
5-40 SYRINGE (ML) INJECTION EVERY 8 HOURS
Status: DISCONTINUED | OUTPATIENT
Start: 2019-01-19 | End: 2019-02-01 | Stop reason: HOSPADM

## 2019-01-19 RX ORDER — SODIUM CHLORIDE 0.9 % (FLUSH) 0.9 %
5-40 SYRINGE (ML) INJECTION AS NEEDED
Status: DISCONTINUED | OUTPATIENT
Start: 2019-01-19 | End: 2019-02-01 | Stop reason: HOSPADM

## 2019-01-19 RX ORDER — HYDROMORPHONE HYDROCHLORIDE 1 MG/ML
1 INJECTION, SOLUTION INTRAMUSCULAR; INTRAVENOUS; SUBCUTANEOUS ONCE
Status: COMPLETED | OUTPATIENT
Start: 2019-01-19 | End: 2019-01-19

## 2019-01-19 RX ORDER — ONDANSETRON 2 MG/ML
4 INJECTION INTRAMUSCULAR; INTRAVENOUS
Status: DISCONTINUED | OUTPATIENT
Start: 2019-01-19 | End: 2019-02-01 | Stop reason: HOSPADM

## 2019-01-19 RX ADMIN — DIPHENHYDRAMINE HYDROCHLORIDE 25 MG: 50 INJECTION, SOLUTION INTRAMUSCULAR; INTRAVENOUS at 18:56

## 2019-01-19 RX ADMIN — HYDROMORPHONE HYDROCHLORIDE 1 MG: 1 INJECTION, SOLUTION INTRAMUSCULAR; INTRAVENOUS; SUBCUTANEOUS at 18:21

## 2019-01-19 RX ADMIN — HYDROMORPHONE HYDROCHLORIDE 2 MG: 1 INJECTION, SOLUTION INTRAMUSCULAR; INTRAVENOUS; SUBCUTANEOUS at 17:11

## 2019-01-19 RX ADMIN — ONDANSETRON 4 MG: 2 INJECTION INTRAMUSCULAR; INTRAVENOUS at 17:09

## 2019-01-19 RX ADMIN — DIPHENHYDRAMINE HYDROCHLORIDE 25 MG: 50 INJECTION, SOLUTION INTRAMUSCULAR; INTRAVENOUS at 17:09

## 2019-01-19 RX ADMIN — SODIUM CHLORIDE 1000 ML: 900 INJECTION, SOLUTION INTRAVENOUS at 17:08

## 2019-01-19 RX ADMIN — SODIUM CHLORIDE 75 ML/HR: 900 INJECTION, SOLUTION INTRAVENOUS at 20:16

## 2019-01-19 RX ADMIN — HYDROMORPHONE HYDROCHLORIDE 1 MG: 1 INJECTION, SOLUTION INTRAMUSCULAR; INTRAVENOUS; SUBCUTANEOUS at 20:16

## 2019-01-19 RX ADMIN — HYDROMORPHONE HYDROCHLORIDE 1 MG: 2 INJECTION, SOLUTION INTRAMUSCULAR; INTRAVENOUS; SUBCUTANEOUS at 22:23

## 2019-01-19 RX ADMIN — Medication 10 ML: at 21:46

## 2019-01-19 RX ADMIN — ONDANSETRON 4 MG: 2 INJECTION INTRAMUSCULAR; INTRAVENOUS at 22:23

## 2019-01-19 NOTE — ED NOTES
Care of patient assumed from triage. Patient presents with complaints of a sickle cell flare up and an inability to manage her pain at home.

## 2019-01-19 NOTE — ED PROVIDER NOTES
EMERGENCY DEPARTMENT HISTORY AND PHYSICAL EXAM 
 
 
Date: 1/19/2019 Patient Name: Michelle Jiang History of Presenting Illness Chief Complaint Patient presents with  Sickle Cell Crisis  
  reports for 2 days she has been having joint pain all over. hx of sickle cell. had a hydrocodone around 11 am  
 
 
History Provided By: Patient HPI: Michelle Jiang, 66 y.o. female with PMHx significant for HTN, anemia, hepatitis C, sickle cell disease, presents ambulatory to the ED with cc of persistent 9/10 generalized arthralgia since Wednesday (1/16/19). Pt notes that the pain is focused on her knee, grown, and elbows. She notes similar acute pain crisis in the past due to sickle cell disease, and normally endorses hydrocodone and fentanyl patch. However, she notes that those medications provided no relief. She is followed by Dr. Hugo Dacosta, and denies seeing any hematologist. Per chart review, she visited ER on 9/8/18, 10/28/18, and 12/12/18 for similar sickle cell crisis and was admitted to the hospital. On the most recent admission on 12/12, she notes that her Hgb dropped to 5.3 and she needed two unites of blood. She notes that her Hgb level was checked again on 1/10/19, but does not recall the level. On her last admission, she also notes that she took benadryl and Zofran by IV to prevent itch and nausea. She specifically denies fever, CP, SOB, dysuria, hematuria, vaginal bleeding, and vaginal discharge. There are no other complaints, changes, or physical findings at this time. PCP: Lawyer Snehal MD 
 
No current facility-administered medications on file prior to encounter. Current Outpatient Medications on File Prior to Encounter Medication Sig Dispense Refill  folic acid (FOLVITE) 1 mg tablet Take 1 Tab by mouth daily.  30 Tab 11  
 citalopram (CELEXA) 10 mg tablet TAKE 1 TABLET BY MOUTH EVERY NIGHT AT BEDTIME 90 Tab 3  
 losartan-hydroCHLOROthiazide (HYZAAR) 50-12.5 mg per tablet TAKE 1 TABLET BY MOUTH DAILY 90 Tab 3  
 HYDROcodone-acetaminophen (NORCO)  mg tablet Take 1 Tab by mouth every six (6) hours as needed. Max Daily Amount: 4 Tabs. 120 Tab 0  
 fentaNYL (DURAGESIC) 75 mcg/hr 1 Patch by TransDERmal route every seventy-two (72) hours. Max Daily Amount: 1 Patch. 10 Patch 0  
 ondansetron (ZOFRAN ODT) 4 mg disintegrating tablet Take 1 Tab by mouth every eight (8) hours as needed for Nausea. 10 Tab 0  promethazine (PHENERGAN) 25 mg suppository Insert 1 Suppository into rectum every six (6) hours as needed for Nausea. 12 Suppository 0  
 OMEPRAZOLE PO Take 20 mg by mouth daily.  fluocinoNIDE (LIDEX) 0.05 % topical cream two (2) times daily as needed. Past History Past Medical History: 
Past Medical History:  
Diagnosis Date  Anemia SC hemoglobinopathy  Chronic pain  Depression  Hypertension  Liver disease   
 hepatitis C; pt reports \"cured\"  Sickle cell disease ([facility] [address] Past Surgical History: 
Past Surgical History:  
Procedure Laterality Date  BREAST SURGERY PROCEDURE UNLISTED 2 cysts removed from R breast  
 CHEST SURGERY PROCEDURE UNLISTED    
 status post thor for removal of a benign  HX BREAST BIOPSY Right 05/2007  
 negative  HX CHOLECYSTECTOMY  HX ORTHOPAEDIC    
 bony curettage of an ostial myelitis focus Family History: 
Family History Problem Relation Age of Onset  Hypertension Mother  Hypertension Father Social History: 
Social History Tobacco Use  Smoking status: Never Smoker  Smokeless tobacco: Never Used Substance Use Topics  Alcohol use: No  
 Drug use: No  
 
 
Allergies: Allergies Allergen Reactions  Dilaudid [Hydromorphone (Bulk)] Itching Can tolerate with benadryl and ondansetron  Percocet [Oxycodone-Acetaminophen] Itching Review of Systems Review of Systems Constitutional: Negative for chills and fever. HENT: Negative for congestion and sore throat. Eyes: Negative for visual disturbance. Respiratory: Negative for cough and shortness of breath. Cardiovascular: Negative for chest pain and leg swelling. Gastrointestinal: Negative for abdominal pain, blood in stool, diarrhea and nausea. Endocrine: Negative for polyuria. Genitourinary: Negative for dysuria, flank pain, hematuria, vaginal bleeding and vaginal discharge. Musculoskeletal: Positive for arthralgias (generalized). Negative for myalgias. Skin: Negative for rash. Allergic/Immunologic: Negative for immunocompromised state. Neurological: Negative for weakness and headaches. Psychiatric/Behavioral: Negative for confusion. Physical Exam  
Physical Exam  
Constitutional: She is oriented to person, place, and time. She appears well-developed and well-nourished. HENT:  
Head: Normocephalic and atraumatic. Dry mucous membranes Eyes: Pupils are equal, round, and reactive to light. Right eye exhibits no discharge. Left eye exhibits no discharge. Pale conjunctiva Neck: Normal range of motion. Neck supple. No tracheal deviation present. Cardiovascular: Regular rhythm and normal heart sounds. Tachycardia present. No murmur heard. Pulmonary/Chest: Effort normal and breath sounds normal. No respiratory distress. She has no wheezes. She has no rales. Abdominal: Soft. Bowel sounds are normal. There is no tenderness. There is no rebound and no guarding. Musculoskeletal: Normal range of motion. She exhibits no edema, tenderness or deformity. Neurological: She is alert and oriented to person, place, and time. Skin: Skin is warm and dry. No rash noted. No erythema. Psychiatric: Her behavior is normal.  
Nursing note and vitals reviewed. Diagnostic Study Results Labs - Recent Results (from the past 12 hour(s)) CBC WITH AUTOMATED DIFF Collection Time: 01/19/19  4:59 PM  
Result Value Ref Range WBC 9.1 3.6 - 11.0 K/uL  
 RBC 3.06 (L) 3.80 - 5.20 M/uL HGB 8.9 (L) 11.5 - 16.0 g/dL HCT 26.8 (L) 35.0 - 47.0 % MCV 87.6 80.0 - 99.0 FL  
 MCH 29.1 26.0 - 34.0 PG  
 MCHC 33.2 30.0 - 36.5 g/dL  
 RDW 17.2 (H) 11.5 - 14.5 % PLATELET 463 623 - 890 K/uL MPV 11.4 8.9 - 12.9 FL  
 NRBC 0.0 0  WBC ABSOLUTE NRBC 0.00 0.00 - 0.01 K/uL NEUTROPHILS 36 32 - 75 % LYMPHOCYTES 52 (H) 12 - 49 % MONOCYTES 10 5 - 13 % EOSINOPHILS 1 0 - 7 % BASOPHILS 0 0 - 1 % IMMATURE GRANULOCYTES 0 0.0 - 0.5 % ABS. NEUTROPHILS 3.3 1.8 - 8.0 K/UL  
 ABS. LYMPHOCYTES 4.7 (H) 0.8 - 3.5 K/UL  
 ABS. MONOCYTES 0.9 0.0 - 1.0 K/UL  
 ABS. EOSINOPHILS 0.1 0.0 - 0.4 K/UL  
 ABS. BASOPHILS 0.0 0.0 - 0.1 K/UL  
 ABS. IMM. GRANS. 0.0 0.00 - 0.04 K/UL  
 DF AUTOMATED METABOLIC PANEL, COMPREHENSIVE Collection Time: 01/19/19  4:59 PM  
Result Value Ref Range Sodium 137 136 - 145 mmol/L Potassium 3.3 (L) 3.5 - 5.1 mmol/L Chloride 104 97 - 108 mmol/L  
 CO2 25 21 - 32 mmol/L Anion gap 8 5 - 15 mmol/L Glucose 87 65 - 100 mg/dL BUN 12 6 - 20 MG/DL Creatinine 0.83 0.55 - 1.02 MG/DL  
 BUN/Creatinine ratio 14 12 - 20 GFR est AA >60 >60 ml/min/1.73m2 GFR est non-AA >60 >60 ml/min/1.73m2 Calcium 9.4 8.5 - 10.1 MG/DL Bilirubin, total 1.0 0.2 - 1.0 MG/DL  
 ALT (SGPT) 21 12 - 78 U/L  
 AST (SGOT) 30 15 - 37 U/L Alk. phosphatase 122 (H) 45 - 117 U/L Protein, total 10.0 (H) 6.4 - 8.2 g/dL Albumin 4.0 3.5 - 5.0 g/dL Globulin 6.0 (H) 2.0 - 4.0 g/dL A-G Ratio 0.7 (L) 1.1 - 2.2 RETICULOCYTE COUNT Collection Time: 01/19/19  4:59 PM  
Result Value Ref Range Reticulocyte count 0.8 0.7 - 2.1 % Absolute Retic Cnt. 0.0254 0.0164 - 0.0776 M/ul CK Collection Time: 01/19/19  4:59 PM  
Result Value Ref Range CK 46 26 - 192 U/L Radiologic Studies - CXR Results  (Last 48 hours) 01/19/19 1652  XR CHEST PORT Final result Impression:  IMPRESSION: No acute findings. Narrative:  EXAM: XR CHEST PORT INDICATION: sickle cell crisis, assess for acute chest  
   
COMPARISON: 12/12/2018 FINDINGS: A portable AP radiograph of the chest was obtained at 1643 hours. Right perihilar linear scarring is again shown. The lungs and pleural margins  
are otherwise clear. The cardiac and mediastinal contours and pulmonary  
vascularity are normal.  No hilar enlargement is shown. Avascular necrosis in  
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 Medical Decision Making I am the first provider for this patient. I reviewed the vital signs, available nursing notes, past medical history, past surgical history, family history and social history. Vital Signs-Reviewed the patient's vital signs. Patient Vitals for the past 12 hrs: 
 Temp Pulse Resp BP SpO2  
01/19/19 1434 99.1 °F (37.3 °C) (!) 112 18 133/83 100 % Pulse Oximetry Analysis - 100% on RA Records Reviewed: Nursing Notes and Old Medical Records Provider Notes (Medical Decision Making): DDx: Consistent with acute pain crisis. Will treat with IV fluid. O2, and IV opiates. Pt often needs hospitalization for same conditions. Will maybe need hospitalization today. ED Course:  
Initial assessment performed. The patients presenting problems have been discussed, and they are in agreement with the care plan formulated and outlined with them. I have encouraged them to ask questions as they arise throughout their visit. PROGRESS NOTE: 
5:44 PM 
Has re-evaluated the pt. Pt notes that her pain is still sever at 8/10 Written by Darin Genao ED Scribe, as dictated by Lloyd Rosario DO. PROGRESS NOTE: 
7:05 PM 
Has re-evaluated the pt. Pt reports that she is still experiencing pain. Will contact hospitalist for admission Written by Darin Genao ED Scribe, as dictated by Lloyd Rosario DO. 
 
CONSULT NOTE:  
7:06 PM 
 Tomas Taylor DO spoke with Dr. Anthony Sellers, Specialty: Hospitalist 
Discussed pt's hx, disposition, and available diagnostic and imaging results. Reviewed care plans. Consultant will evaluate pt for admission. Written by OZZIE Gutiérrez, as dictated by Tomas Taylor DO. Critical Care Time:  
None Disposition: 
ADMIT NOTE: 
7:07 PM 
The patient is being admitted to the hospital by Dr. Anthony Sellers. The results of their tests and reasons for their admission have been discussed with the patient and/or available family. They convey agreement and understanding for the need to be admitted and for their admission diagnosis. PLAN: 
1. Admit by hospitalist 
 
Diagnosis Clinical Impression: 1. Sickle cell pain crisis ([facility] [address] Attestations: This note is prepared by Yordan Gomez, acting as Scribe for Tomas Taylor DO. Tomas Taylor DO: The scribe's documentation has been prepared under my direction and personally reviewed by me in its entirety. I confirm that the note above accurately reflects all work, treatment, procedures, and medical decision making performed by me.

## 2019-01-20 LAB
ALBUMIN SERPL-MCNC: 3.2 G/DL (ref 3.5–5)
ALBUMIN/GLOB SERPL: 0.6 {RATIO} (ref 1.1–2.2)
ALP SERPL-CCNC: 106 U/L (ref 45–117)
ALT SERPL-CCNC: 25 U/L (ref 12–78)
ANION GAP SERPL CALC-SCNC: 9 MMOL/L (ref 5–15)
AST SERPL-CCNC: 47 U/L (ref 15–37)
BASOPHILS # BLD: 0 K/UL (ref 0–0.1)
BASOPHILS NFR BLD: 0 % (ref 0–1)
BILIRUB SERPL-MCNC: 1.1 MG/DL (ref 0.2–1)
BUN SERPL-MCNC: 10 MG/DL (ref 6–20)
BUN/CREAT SERPL: 13 (ref 12–20)
CALCIUM SERPL-MCNC: 8.4 MG/DL (ref 8.5–10.1)
CHLORIDE SERPL-SCNC: 106 MMOL/L (ref 97–108)
CO2 SERPL-SCNC: 22 MMOL/L (ref 21–32)
CREAT SERPL-MCNC: 0.75 MG/DL (ref 0.55–1.02)
CRP SERPL-MCNC: <0.29 MG/DL (ref 0–0.6)
DIFFERENTIAL METHOD BLD: ABNORMAL
EOSINOPHIL # BLD: 0.2 K/UL (ref 0–0.4)
EOSINOPHIL NFR BLD: 2 % (ref 0–7)
ERYTHROCYTE [DISTWIDTH] IN BLOOD BY AUTOMATED COUNT: 16.7 % (ref 11.5–14.5)
GLOBULIN SER CALC-MCNC: 5.1 G/DL (ref 2–4)
GLUCOSE SERPL-MCNC: 87 MG/DL (ref 65–100)
HCT VFR BLD AUTO: 24.6 % (ref 35–47)
HGB BLD-MCNC: 8.4 G/DL (ref 11.5–16)
IMM GRANULOCYTES # BLD AUTO: 0 K/UL (ref 0–0.04)
IMM GRANULOCYTES NFR BLD AUTO: 0 % (ref 0–0.5)
LYMPHOCYTES # BLD: 5 K/UL (ref 0.8–3.5)
LYMPHOCYTES NFR BLD: 50 % (ref 12–49)
MCH RBC QN AUTO: 29.8 PG (ref 26–34)
MCHC RBC AUTO-ENTMCNC: 34.1 G/DL (ref 30–36.5)
MCV RBC AUTO: 87.2 FL (ref 80–99)
MONOCYTES # BLD: 0.9 K/UL (ref 0–1)
MONOCYTES NFR BLD: 9 % (ref 5–13)
NEUTS SEG # BLD: 3.8 K/UL (ref 1.8–8)
NEUTS SEG NFR BLD: 38 % (ref 32–75)
NRBC # BLD: 0.02 K/UL (ref 0–0.01)
NRBC BLD-RTO: 0.2 PER 100 WBC
PLATELET # BLD AUTO: 200 K/UL (ref 150–400)
PMV BLD AUTO: 11.9 FL (ref 8.9–12.9)
POTASSIUM SERPL-SCNC: 3.1 MMOL/L (ref 3.5–5.1)
PROT SERPL-MCNC: 8.3 G/DL (ref 6.4–8.2)
RBC # BLD AUTO: 2.82 M/UL (ref 3.8–5.2)
RETICS # AUTO: 0.03 M/UL (ref 0.02–0.08)
RETICS/RBC NFR AUTO: 1.1 % (ref 0.7–2.1)
SODIUM SERPL-SCNC: 137 MMOL/L (ref 136–145)
WBC # BLD AUTO: 10 K/UL (ref 3.6–11)

## 2019-01-20 PROCEDURE — 85045 AUTOMATED RETICULOCYTE COUNT: CPT

## 2019-01-20 PROCEDURE — 74011250636 HC RX REV CODE- 250/636: Performed by: INTERNAL MEDICINE

## 2019-01-20 PROCEDURE — 74011250637 HC RX REV CODE- 250/637: Performed by: INTERNAL MEDICINE

## 2019-01-20 PROCEDURE — 85025 COMPLETE CBC W/AUTO DIFF WBC: CPT

## 2019-01-20 PROCEDURE — 36415 COLL VENOUS BLD VENIPUNCTURE: CPT

## 2019-01-20 PROCEDURE — 80053 COMPREHEN METABOLIC PANEL: CPT

## 2019-01-20 PROCEDURE — 86140 C-REACTIVE PROTEIN: CPT

## 2019-01-20 PROCEDURE — 94760 N-INVAS EAR/PLS OXIMETRY 1: CPT

## 2019-01-20 PROCEDURE — 65270000015 HC RM PRIVATE ONCOLOGY

## 2019-01-20 RX ORDER — FOLIC ACID 1 MG/1
1 TABLET ORAL DAILY
Status: DISCONTINUED | OUTPATIENT
Start: 2019-01-20 | End: 2019-02-01 | Stop reason: HOSPADM

## 2019-01-20 RX ORDER — POTASSIUM CHLORIDE 750 MG/1
40 TABLET, FILM COATED, EXTENDED RELEASE ORAL
Status: COMPLETED | OUTPATIENT
Start: 2019-01-20 | End: 2019-01-20

## 2019-01-20 RX ORDER — SODIUM CHLORIDE AND POTASSIUM CHLORIDE .9; .15 G/100ML; G/100ML
SOLUTION INTRAVENOUS CONTINUOUS
Status: DISCONTINUED | OUTPATIENT
Start: 2019-01-20 | End: 2019-01-21

## 2019-01-20 RX ORDER — DIPHENHYDRAMINE HYDROCHLORIDE 50 MG/ML
25 INJECTION, SOLUTION INTRAMUSCULAR; INTRAVENOUS
Status: DISCONTINUED | OUTPATIENT
Start: 2019-01-20 | End: 2019-02-01 | Stop reason: HOSPADM

## 2019-01-20 RX ORDER — CITALOPRAM 20 MG/1
10 TABLET, FILM COATED ORAL DAILY
Status: DISCONTINUED | OUTPATIENT
Start: 2019-01-20 | End: 2019-02-01 | Stop reason: HOSPADM

## 2019-01-20 RX ORDER — POTASSIUM CHLORIDE 750 MG/1
40 TABLET, FILM COATED, EXTENDED RELEASE ORAL 2 TIMES DAILY
Status: COMPLETED | OUTPATIENT
Start: 2019-01-20 | End: 2019-01-20

## 2019-01-20 RX ORDER — HYDROMORPHONE HYDROCHLORIDE 2 MG/ML
1-2 INJECTION, SOLUTION INTRAMUSCULAR; INTRAVENOUS; SUBCUTANEOUS
Status: DISCONTINUED | OUTPATIENT
Start: 2019-01-20 | End: 2019-01-29

## 2019-01-20 RX ORDER — PANTOPRAZOLE SODIUM 40 MG/1
40 TABLET, DELAYED RELEASE ORAL
Status: DISCONTINUED | OUTPATIENT
Start: 2019-01-20 | End: 2019-01-22

## 2019-01-20 RX ADMIN — POTASSIUM CHLORIDE 40 MEQ: 10 TABLET, FILM COATED, EXTENDED RELEASE ORAL at 17:00

## 2019-01-20 RX ADMIN — SODIUM CHLORIDE AND POTASSIUM CHLORIDE: 9; 1.49 INJECTION, SOLUTION INTRAVENOUS at 08:00

## 2019-01-20 RX ADMIN — HEPARIN SODIUM 5000 UNITS: 5000 INJECTION INTRAVENOUS; SUBCUTANEOUS at 09:00

## 2019-01-20 RX ADMIN — DIPHENHYDRAMINE HYDROCHLORIDE 25 MG: 50 INJECTION, SOLUTION INTRAMUSCULAR; INTRAVENOUS at 17:28

## 2019-01-20 RX ADMIN — POTASSIUM CHLORIDE 40 MEQ: 750 TABLET, EXTENDED RELEASE ORAL at 02:32

## 2019-01-20 RX ADMIN — ONDANSETRON 4 MG: 2 INJECTION INTRAMUSCULAR; INTRAVENOUS at 17:28

## 2019-01-20 RX ADMIN — ONDANSETRON 4 MG: 2 INJECTION INTRAMUSCULAR; INTRAVENOUS at 21:47

## 2019-01-20 RX ADMIN — HYDROMORPHONE HYDROCHLORIDE 2 MG: 2 INJECTION, SOLUTION INTRAMUSCULAR; INTRAVENOUS; SUBCUTANEOUS at 09:27

## 2019-01-20 RX ADMIN — PANTOPRAZOLE SODIUM 40 MG: 40 TABLET, DELAYED RELEASE ORAL at 06:57

## 2019-01-20 RX ADMIN — HEPARIN SODIUM 5000 UNITS: 5000 INJECTION INTRAVENOUS; SUBCUTANEOUS at 17:00

## 2019-01-20 RX ADMIN — DIPHENHYDRAMINE HYDROCHLORIDE 25 MG: 50 INJECTION, SOLUTION INTRAMUSCULAR; INTRAVENOUS at 21:47

## 2019-01-20 RX ADMIN — DIPHENHYDRAMINE HYDROCHLORIDE 25 MG: 50 INJECTION, SOLUTION INTRAMUSCULAR; INTRAVENOUS at 14:38

## 2019-01-20 RX ADMIN — Medication 10 ML: at 14:00

## 2019-01-20 RX ADMIN — HYDROMORPHONE HYDROCHLORIDE 2 MG: 2 INJECTION, SOLUTION INTRAMUSCULAR; INTRAVENOUS; SUBCUTANEOUS at 02:33

## 2019-01-20 RX ADMIN — ONDANSETRON 4 MG: 2 INJECTION INTRAMUSCULAR; INTRAVENOUS at 11:08

## 2019-01-20 RX ADMIN — POTASSIUM CHLORIDE 40 MEQ: 10 TABLET, FILM COATED, EXTENDED RELEASE ORAL at 09:18

## 2019-01-20 RX ADMIN — HYDROMORPHONE HYDROCHLORIDE 2 MG: 2 INJECTION, SOLUTION INTRAMUSCULAR; INTRAVENOUS; SUBCUTANEOUS at 12:17

## 2019-01-20 RX ADMIN — DIPHENHYDRAMINE HYDROCHLORIDE 25 MG: 25 CAPSULE ORAL at 06:56

## 2019-01-20 RX ADMIN — HYDROMORPHONE HYDROCHLORIDE 2 MG: 2 INJECTION, SOLUTION INTRAMUSCULAR; INTRAVENOUS; SUBCUTANEOUS at 17:28

## 2019-01-20 RX ADMIN — HYDROMORPHONE HYDROCHLORIDE 2 MG: 2 INJECTION, SOLUTION INTRAMUSCULAR; INTRAVENOUS; SUBCUTANEOUS at 20:13

## 2019-01-20 RX ADMIN — CITALOPRAM HYDROBROMIDE 10 MG: 20 TABLET ORAL at 09:18

## 2019-01-20 RX ADMIN — HYDROMORPHONE HYDROCHLORIDE 2 MG: 2 INJECTION, SOLUTION INTRAMUSCULAR; INTRAVENOUS; SUBCUTANEOUS at 00:32

## 2019-01-20 RX ADMIN — HYDROMORPHONE HYDROCHLORIDE 2 MG: 2 INJECTION, SOLUTION INTRAMUSCULAR; INTRAVENOUS; SUBCUTANEOUS at 06:56

## 2019-01-20 RX ADMIN — HYDROMORPHONE HYDROCHLORIDE 2 MG: 2 INJECTION, SOLUTION INTRAMUSCULAR; INTRAVENOUS; SUBCUTANEOUS at 04:45

## 2019-01-20 RX ADMIN — ONDANSETRON 4 MG: 2 INJECTION INTRAMUSCULAR; INTRAVENOUS at 06:56

## 2019-01-20 RX ADMIN — ONDANSETRON 4 MG: 2 INJECTION INTRAMUSCULAR; INTRAVENOUS at 02:33

## 2019-01-20 RX ADMIN — HYDROMORPHONE HYDROCHLORIDE 2 MG: 2 INJECTION, SOLUTION INTRAMUSCULAR; INTRAVENOUS; SUBCUTANEOUS at 14:38

## 2019-01-20 RX ADMIN — DIPHENHYDRAMINE HYDROCHLORIDE 25 MG: 25 CAPSULE ORAL at 00:31

## 2019-01-20 RX ADMIN — FOLIC ACID 1 MG: 1 TABLET ORAL at 09:18

## 2019-01-20 RX ADMIN — DIPHENHYDRAMINE HYDROCHLORIDE 25 MG: 50 INJECTION, SOLUTION INTRAMUSCULAR; INTRAVENOUS at 11:09

## 2019-01-20 RX ADMIN — Medication 10 ML: at 06:57

## 2019-01-20 RX ADMIN — ONDANSETRON 4 MG: 2 INJECTION INTRAMUSCULAR; INTRAVENOUS at 14:38

## 2019-01-20 RX ADMIN — HYDROMORPHONE HYDROCHLORIDE 2 MG: 2 INJECTION, SOLUTION INTRAMUSCULAR; INTRAVENOUS; SUBCUTANEOUS at 23:18

## 2019-01-20 NOTE — H&P
Hospitalist Admission Note NAME:  Liza Jc :   1960 MRN:   [id number] Date of admit: 2019 PCP: Cory Wilson MD 
 
Assessment/Plan:  
 
Acute sickle cell crisis HgB stable, retic count is low Current pain however is typical for her crises pCXR negative Not currently on fentanyl patch per her report x weeks, will hold for now IV dilaudid 1-2 mg every 2 hours as needed, if not controlled by Am, ? Add back patch or start PCA Monitor carefully for side effects IVF O2 as it makes patient feel better Resume home folic acid Check CRP Hypokalemia Hold losartan and hydrochlorothiazide PO KCL Recheck in AM 
 
Essential HTN POA 
BP lower range  like prior admits Hold lisinopril and hydrochlorothiazide PRn NTG History of gastroesophageal reflux disease Resume home omeprazole Depression Resume Celexa Overweight   POA Body mass index is 25.24 kg/m². Given the patient's current clinical presentation, I have a high level of concern for decompensation if discharged from the emergency department. My assessment of this patient's clinical condition and my plan of care is as noted above. DVT prophylaxis with heparin SQ Code Status: Full Surrogate Decision Maker: Dtr Nelson Liao History CHIEF COMPLAINT: my knees, groin and elbows have been hurting for 3 days HISTORY OF PRESENT ILLNESS: 
66 y.o. female Known sickle cell disease, 5 admits at [facility] for pain crises in 2018, last in 2018(dropped HgB to 5.3) Prior used fentanyl patch, says she has not been on them in past few weeks Baseline always some pain, uses hydrocodone-APAP as needed Past 3 days, achy al;l over Increasing pain in bilateral knees, groin, and elbow, typical of her sickle karly, pain crises. Pain is 10/10, was not controlled with her home pain medications, came to the ED No fevers, chills, HA, sore throat, CP or cough or SOB No abdominal pain, N/V or diarrhea, dysuria ED  
Very uncomfortable, received 5 mg IV dilaudid Pain remained 8/10 HgB 8.9 with retic 0.8 K 3.3, Po KCL ordered pCXR with no ASD 
UA negative We were called to admit patient for sickle cell pain crisis management Past Medical History:  
Diagnosis Date  Anemia SC hemoglobinopathy  Chronic pain  Depression  Hypertension  Liver disease   
 hepatitis C; pt reports \"cured\"  Sickle cell disease ([address] Past Surgical History:  
Procedure Laterality Date  BREAST SURGERY PROCEDURE UNLISTED 2 cysts removed from R breast  
 CHEST SURGERY PROCEDURE UNLISTED    
 status post thor for removal of a benign  HX BREAST BIOPSY Right 05/2007  
 negative  HX CHOLECYSTECTOMY  HX ORTHOPAEDIC    
 bony curettage of an ostial myelitis focus Social History Tobacco Use  Smoking status: Never Smoker  Smokeless tobacco: Never Used Substance Use Topics  Alcohol use: No  
  
 
Family History Problem Relation Age of Onset  Hypertension Mother  Hypertension Father Allergies Allergen Reactions  Dilaudid [Hydromorphone (Bulk)] Itching Can tolerate with benadryl and ondansetron  Percocet [Oxycodone-Acetaminophen] Itching Prior to Admission medications Medication Sig Start Date End Date Taking? Authorizing Provider  
folic acid (FOLVITE) 1 mg tablet Take 1 Tab by mouth daily. 1/10/19   Lanette Ferro MD  
citalopram (CELEXA) 10 mg tablet TAKE 1 TABLET BY MOUTH EVERY NIGHT AT BEDTIME 1/8/19   Lanette Ferro MD  
losartan-hydroCHLOROthiazide [facility]) 50-12.5 mg per tablet TAKE 1 TABLET BY MOUTH DAILY 12/27/18   Lanette Ferro MD  
HYDROcodone-acetaminophen [facility])  mg tablet Take 1 Tab by mouth every six (6) hours as needed. Max Daily Amount: 4 Tabs. 12/5/18   Lanette Ferro MD  
fentaNYL (DURAGESIC) 75 mcg/hr 1 Patch by TransDERmal route every seventy-two (72) hours. Max Daily Amount: 1 Patch.  10/23/18   Karina Indira Donahue MD  
ondansetron (ZOFRAN ODT) 4 mg disintegrating tablet Take 1 Tab by mouth every eight (8) hours as needed for Nausea. 7/15/18   Ritchie Miramontes MD  
promethazine (PHENERGAN) 25 mg suppository Insert 1 Suppository into rectum every six (6) hours as needed for Nausea. 7/15/18   Ritchie Miramontes MD  
OMEPRAZOLE PO Take 20 mg by mouth daily. Other, MD Ean  
fluocinoNIDE (LIDEX) 0.05 % topical cream two (2) times daily as needed. 8/1/14   Provider, Historical  
 
 
Review of symptoms: 
   POSITIVE= Bold  Negative = not bold General:  fever, chills, sweats Eyes:    blurred vision, eye pain, double vision ENT:    Coryza, sore throat, trouble swallowing Respiratory:   cough, sputum, SOB Cardiology:   chest pain, orthopnea, PND, edema Gastrointestinal:  abdominal pain , N/V, diarrhea, constipation, melena or BRBPR Genitourinary:  Urgency, dysuria, hematuria Muskuloskeletal :  Joint redness, swelling or acute joint pain, myalgias Hematology:  easy bruising, nose or gum bleeding Dermatological: rash, ulceration Endocrine:   Polyuria or polydipsia, heat or hold intolerance Neurological:  Headache, focal motor or sensory changes Speech difficulties, memory loss Psychological: depression, agitation Objective: VITALS:   
Patient Vitals for the past 24 hrs: 
 Temp Pulse Resp BP SpO2  
01/19/19 2354 98.3 °F (36.8 °C) 88 18 115/59 98 % 01/19/19 2120 100.4 °F (38 °C) 92 18 (!) 122/98 100 % 01/19/19 2100     100 % 01/19/19 2045    94/79 100 % 01/19/19 2030    116/67 100 % 01/19/19 2015    130/69 98 % 01/19/19 2000    112/65 100 % 01/19/19 1945    (!) 114/91 100 % 01/19/19 1930    112/86 100 % 01/19/19 1915    113/65 100 % 01/19/19 1900    116/69 100 % 01/19/19 1845    112/64 100 % 01/19/19 1830    113/71 100 % 01/19/19 1815    112/68 100 % 01/19/19 1800    111/68 100 % 01/19/19 1745    114/64 100 % 19 1730    112/68 100 % 19 1715    113/77 100 % 19 1700    120/58 100 % 19 1645    106/72 100 % 19 1434 99.1 °F (37.3 °C) (!) 112 18 133/83 100 % Temp (24hrs), Av.3 °F (37.4 °C), Min:98.3 °F (36.8 °C), Max:100.4 °F (38 °C) O2 Device: Room air Wt Readings from Last 12 Encounters:  
19 79.8 kg (175 lb 14.8 oz) 01/10/19 82.9 kg (182 lb 12.8 oz)  
18 77 kg (169 lb 12.1 oz)  
18 89.4 kg (197 lb 1.6 oz) 10/29/18 77.4 kg (170 lb 10.2 oz)  
10/02/18 91.5 kg (201 lb 12.8 oz) 18 86.3 kg (190 lb 3.2 oz) 18 83.9 kg (185 lb)  
18 85.7 kg (188 lb 15 oz) 18 86.6 kg (191 lb)  
18 88.7 kg (195 lb 8 oz) 18 86 kg (189 lb 8 oz) PHYSICAL EXAM:  
General:    Alert, cooperative in no distress HEENT: Normocephalic, atraumatic    PERRL, Sclera no icterus Nasal mucosa without masses or discharge  Hearing intact to voice Oropharynx without erythema or exudate pale MM Neck:  No meningismus, trachea midline, no carotid bruits Thyroid not enlarged, no nodules or tenderness Lungs:   Clear to auscultation bilaterally. No wheezing or rales No accessory muscle use or retractions. Heart:   Regular rate and rhythm,  no murmur or gallop. No LE edema Abdomen:   Soft, non-tender. Not distended. Bowel sounds normal.  
  No masses, No Hepatosplenomegaly, No Rebound or guarding Lymph nodes: No cervical or inguinal OCTAVIA Musculoskeletal:  No Joint swelling, erythema, warmth. No Cyanosis or clubbing Skin:      No rashes Not Jaundiced   No nodules or thickening Neurologic: Alert and oriented X 3, follows commands Cranial nerves 2 to 12 intact Symmetric motor strength bilaterally LAB DATA REVIEWED:   
Recent Results (from the past 12 hour(s)) CBC WITH AUTOMATED DIFF Collection Time: 19  4:59 PM  
Result Value Ref Range WBC 9.1 3.6 - 11.0 K/uL RBC 3.06 (L) 3.80 - 5.20 M/uL HGB 8.9 (L) 11.5 - 16.0 g/dL HCT 26.8 (L) 35.0 - 47.0 % MCV 87.6 80.0 - 99.0 FL  
 MCH 29.1 26.0 - 34.0 PG  
 MCHC 33.2 30.0 - 36.5 g/dL  
 RDW 17.2 (H) 11.5 - 14.5 % PLATELET 071 722 - 977 K/uL MPV 11.4 8.9 - 12.9 FL  
 NRBC 0.0 0  WBC ABSOLUTE NRBC 0.00 0.00 - 0.01 K/uL NEUTROPHILS 36 32 - 75 % LYMPHOCYTES 52 (H) 12 - 49 % MONOCYTES 10 5 - 13 % EOSINOPHILS 1 0 - 7 % BASOPHILS 0 0 - 1 % IMMATURE GRANULOCYTES 0 0.0 - 0.5 % ABS. NEUTROPHILS 3.3 1.8 - 8.0 K/UL  
 ABS. LYMPHOCYTES 4.7 (H) 0.8 - 3.5 K/UL  
 ABS. MONOCYTES 0.9 0.0 - 1.0 K/UL  
 ABS. EOSINOPHILS 0.1 0.0 - 0.4 K/UL  
 ABS. BASOPHILS 0.0 0.0 - 0.1 K/UL  
 ABS. IMM. GRANS. 0.0 0.00 - 0.04 K/UL  
 DF AUTOMATED METABOLIC PANEL, COMPREHENSIVE Collection Time: 01/19/19  4:59 PM  
Result Value Ref Range Sodium 137 136 - 145 mmol/L Potassium 3.3 (L) 3.5 - 5.1 mmol/L Chloride 104 97 - 108 mmol/L  
 CO2 25 21 - 32 mmol/L Anion gap 8 5 - 15 mmol/L Glucose 87 65 - 100 mg/dL BUN 12 6 - 20 MG/DL Creatinine 0.83 0.55 - 1.02 MG/DL  
 BUN/Creatinine ratio 14 12 - 20 GFR est AA >60 >60 ml/min/1.73m2 GFR est non-AA >60 >60 ml/min/1.73m2 Calcium 9.4 8.5 - 10.1 MG/DL Bilirubin, total 1.0 0.2 - 1.0 MG/DL  
 ALT (SGPT) 21 12 - 78 U/L  
 AST (SGOT) 30 15 - 37 U/L Alk. phosphatase 122 (H) 45 - 117 U/L Protein, total 10.0 (H) 6.4 - 8.2 g/dL Albumin 4.0 3.5 - 5.0 g/dL Globulin 6.0 (H) 2.0 - 4.0 g/dL A-G Ratio 0.7 (L) 1.1 - 2.2 RETICULOCYTE COUNT Collection Time: 01/19/19  4:59 PM  
Result Value Ref Range Reticulocyte count 0.8 0.7 - 2.1 % Absolute Retic Cnt. 0.0254 0.0164 - 0.0776 M/ul CK Collection Time: 01/19/19  4:59 PM  
Result Value Ref Range CK 46 26 - 192 U/L  
URINALYSIS W/ REFLEX CULTURE Collection Time: 01/19/19 10:28 PM  
Result Value Ref Range Color YELLOW/STRAW  Appearance CLEAR CLEAR    
 Specific gravity 1.013 1.003 - 1.030    
 pH (UA) 6.0 5.0 - 8.0 Protein NEGATIVE  NEG mg/dL Glucose NEGATIVE  NEG mg/dL Ketone NEGATIVE  NEG mg/dL Bilirubin NEGATIVE  NEG Blood NEGATIVE  NEG Urobilinogen 1.0 0.2 - 1.0 EU/dL Nitrites NEGATIVE  NEG Leukocyte Esterase TRACE (A) NEG    
 WBC 0-4 0 - 4 /hpf  
 RBC 0-5 0 - 5 /hpf Epithelial cells FEW FEW /lpf Bacteria NEGATIVE  NEG /hpf  
 UA:UC IF INDICATED CULTURE NOT INDICATED BY UA RESULT CNI Hyaline cast 0-2 0 - 5 /lpf CXR read by radiology and reviewed by myself shows no ASD or edema I saw the patient personally, took a history and did a complete physical exam at the bedside. I performed complex decision making in coming up with a diagnostic and treatment plan for the patient. I reviewed the patient's past medical records, current laboratory and radiology results, and actual Xray films/EKG. I have also discussed this case with the involved ED physician. Care Plan discussed with: 
  Patient, ED Doc Risk of deterioration:  High Total Time Coordinating Admission:  65   minutes   Total Critical Care Time:    
 
 
Mark Celeste MD

## 2019-01-20 NOTE — PROGRESS NOTES
Hospital Progress Note NAME:  Reymundo Beck :   1960 MRN:  [id number] Date/Time:  2019 7:13 AM 
 
Plan: 1. Iv benadryl 2. Analgesics 3. Correct potassium Risk of Deterioration: Low  []           Moderate  [x]           High  [] Assessment:  
Principal Problem: 
  Sickle cell pain crisis ([facility] [address] (2018) Dilaudid/benadryl Iv fluids 
    zofran for nausea Active Problems: 
  Sickle cell anemia ([facility] [address] (10/3/2014) Hypertension (10/3/2014) Titrate home meds when needed Depression (10/5/2014) celexa Admission note: Reymundo Beck, 66 y.o. female with PMHx significant for HTN, anemia, hepatitis C, sickle cell disease, presents ambulatory to the ED with cc of persistent 9/10 generalized arthralgia since Wednesday (19). Pt notes that the pain is focused on her knee, grown, and elbows. She notes similar acute pain crisis in the past due to sickle cell disease, and normally endorses hydrocodone and fentanyl patch. However, she notes that those medications provided no relief. She is followed by Dr. Darlene Roth. Per chart review, she visited ER on 18, 10/28/18, and 18 for similar sickle cell crisis and was admitted to the hospital. On the most recent admission on , she notes that her Hgb dropped to 5.3 and she needed two unites of blood. She notes that her Hgb level was checked again on 1/10/19, but does not recall the level. On her last admission, she also notes that she took benadryl and Zofran by IV to prevent itch and nausea. Subjective/interium history:  
 
Continues to feel miserable with joint pain knees,groin,back,elbows and just hurting all over 11 Point Review of Systems:  
Negative except mo sp/sob []            Unable to obtain ROS due to:      
[]            mental status change []            sedated []            intubated Social History Tobacco Use  Smoking status: Never Smoker  Smokeless tobacco: Never Used Substance Use Topics  Alcohol use: No  
 
Medications reviewed: 
Current Facility-Administered Medications Medication Dose Route Frequency  HYDROmorphone (PF) (DILAUDID) injection 1-2 mg  1-2 mg IntraVENous Q2H PRN  
 citalopram (CELEXA) tablet 10 mg  10 mg Oral DAILY  pantoprazole (PROTONIX) tablet 40 mg  40 mg Oral ACB  folic acid (FOLVITE) tablet 1 mg  1 mg Oral DAILY  nitroglycerin (NITROBID) 2 % ointment 2 Inch  2 Inch Topical Q6H PRN  
 diphenhydrAMINE (BENADRYL) injection 25 mg  25 mg IntraVENous Q4H PRN  
 sodium chloride (NS) flush 5-40 mL  5-40 mL IntraVENous Q8H  
 sodium chloride (NS) flush 5-40 mL  5-40 mL IntraVENous PRN  
 naloxone (NARCAN) injection 0.4 mg  0.4 mg IntraVENous PRN  
 ondansetron (ZOFRAN) injection 4 mg  4 mg IntraVENous Q4H PRN  
 bisacodyl (DULCOLAX) suppository 10 mg  10 mg Rectal DAILY PRN  
 0.9% sodium chloride infusion  75 mL/hr IntraVENous CONTINUOUS  
 heparin (porcine) injection 5,000 Units  5,000 Units SubCUTAneous Q8H Objective:  
Vitals: 
Visit Vitals /59 (BP 1 Location: Right arm, BP Patient Position: At rest) Pulse 88 Temp 98.3 °F (36.8 °C) Resp 18 Ht 5' 10\" (1.778 m) Wt 175 lb 14.8 oz (79.8 kg) SpO2 98% BMI 25.24 kg/m² Temp (24hrs), Av.3 °F (37.4 °C), Min:98.3 °F (36.8 °C), Max:100.4 °F (38 °C) O2 Device: Room air Last 24hr Input/Output: 
 
Intake/Output Summary (Last 24 hours) at 2019 5897 Last data filed at 2019 3214 Gross per 24 hour Intake  Output 500 ml Net -500 ml PHYSICAL EXAM: 
General:    Alert, cooperative, no distress, appears stated age. Head:   Normocephalic, without obvious abnormality, atraumatic. Eyes:   Conjunctivae/corneas clear. PERRLA Lungs:   Clear to auscultation bilaterally. No Wheezing or Rhonchi. No rales. Heart:   Regular rate and rhythm,  no murmur, rub or gallop. Abdomen:   Soft, non-tender. Not distended. Bowel sounds normal. No masses. Lab Data Reviewed: 
 
Recent Labs  
  01/20/19 
0350 01/19/19 
1659 WBC 10.0 9.1 HGB 8.4* 8.9* HCT 24.6* 26.8*  
 216 Recent Labs  
  01/20/19 
0252 01/19/19 
1659  137  
K 3.1* 3.3*  
 104 CO2 22 25 GLU 87 87 BUN 10 12 CREA 0.75 0.83 CA 8.4* 9.4 ALB 3.2* 4.0 TBILI 1.1* 1.0  
SGOT 47* 30 ALT 25 21 Lab Results Component Value Date/Time Glucose (POC) 98 07/24/2018 07:39 AM  
 Glucose (POC) 112 (H) 07/23/2018 09:03 PM  
 Glucose (POC) 136 (H) 07/23/2018 04:31 PM  
 Glucose (POC) 95 07/23/2018 12:07 PM  
 Glucose (POC) 103 (H) 07/23/2018 08:27 AM  
 
___________________________________________________ 
___________________________________________________ Attending Physician: Jene Cogan, MD

## 2019-01-20 NOTE — PROGRESS NOTES
2107 - TRANSFER - IN REPORT: 
 
Verbal report received from [facility] (name) on Holladn Aldridge  being received from ED (unit) for routine progression of care. Report consisted of patients Situation, Background, Assessment and Recommendations(SBAR). Information from the following report(s) SBAR, Kardex and ED Summary was reviewed with the receiving nurse. Opportunity for questions and clarification was provided. 2120 - Pt arrived to unit, pt up ad erum, states her pain is 9/10 and she will be ready for her pain medicine as soon as it's available. Primary Nurse Ena Weber RN and Walker Gruber RN performed a dual skin assessment on this patient no impairment noted. Nuno score is 23. 
 
2315 - Bedside and Verbal shift change report given to Via Ankit Schrader (oncoming nurse) by Bashir Lorenz RN (offgoing nurse). Report included the following information SBAR, Kardex, ED Summary and MAR. Pt denies any complaints or concerns at this time.

## 2019-01-21 LAB
ANION GAP SERPL CALC-SCNC: 6 MMOL/L (ref 5–15)
BASOPHILS # BLD: 0.1 K/UL (ref 0–0.1)
BASOPHILS NFR BLD: 1 % (ref 0–1)
BUN SERPL-MCNC: 9 MG/DL (ref 6–20)
BUN/CREAT SERPL: 11 (ref 12–20)
CALCIUM SERPL-MCNC: 8.3 MG/DL (ref 8.5–10.1)
CHLORIDE SERPL-SCNC: 105 MMOL/L (ref 97–108)
CO2 SERPL-SCNC: 28 MMOL/L (ref 21–32)
CREAT SERPL-MCNC: 0.79 MG/DL (ref 0.55–1.02)
DIFFERENTIAL METHOD BLD: ABNORMAL
EOSINOPHIL # BLD: 0.4 K/UL (ref 0–0.4)
EOSINOPHIL NFR BLD: 4 % (ref 0–7)
ERYTHROCYTE [DISTWIDTH] IN BLOOD BY AUTOMATED COUNT: 17.8 % (ref 11.5–14.5)
GLUCOSE SERPL-MCNC: 109 MG/DL (ref 65–100)
HCT VFR BLD AUTO: 21.9 % (ref 35–47)
HGB BLD-MCNC: 7.4 G/DL (ref 11.5–16)
IMM GRANULOCYTES # BLD AUTO: 0 K/UL (ref 0–0.04)
IMM GRANULOCYTES NFR BLD AUTO: 0 % (ref 0–0.5)
LYMPHOCYTES # BLD: 6.2 K/UL (ref 0.8–3.5)
LYMPHOCYTES NFR BLD: 64 % (ref 12–49)
MCH RBC QN AUTO: 29.7 PG (ref 26–34)
MCHC RBC AUTO-ENTMCNC: 33.8 G/DL (ref 30–36.5)
MCV RBC AUTO: 88 FL (ref 80–99)
MONOCYTES # BLD: 0.6 K/UL (ref 0–1)
MONOCYTES NFR BLD: 6 % (ref 5–13)
NEUTS SEG # BLD: 2.4 K/UL (ref 1.8–8)
NEUTS SEG NFR BLD: 25 % (ref 32–75)
NRBC # BLD: 0.03 K/UL (ref 0–0.01)
NRBC BLD-RTO: 0.3 PER 100 WBC
PLATELET # BLD AUTO: 174 K/UL (ref 150–400)
PMV BLD AUTO: 11.9 FL (ref 8.9–12.9)
POTASSIUM SERPL-SCNC: 3.8 MMOL/L (ref 3.5–5.1)
RBC # BLD AUTO: 2.49 M/UL (ref 3.8–5.2)
RBC MORPH BLD: ABNORMAL
SODIUM SERPL-SCNC: 139 MMOL/L (ref 136–145)
WBC # BLD AUTO: 9.7 K/UL (ref 3.6–11)

## 2019-01-21 PROCEDURE — 65270000015 HC RM PRIVATE ONCOLOGY

## 2019-01-21 PROCEDURE — 85025 COMPLETE CBC W/AUTO DIFF WBC: CPT

## 2019-01-21 PROCEDURE — 74011250636 HC RX REV CODE- 250/636: Performed by: INTERNAL MEDICINE

## 2019-01-21 PROCEDURE — 94760 N-INVAS EAR/PLS OXIMETRY 1: CPT

## 2019-01-21 PROCEDURE — 74011250637 HC RX REV CODE- 250/637: Performed by: INTERNAL MEDICINE

## 2019-01-21 PROCEDURE — 80048 BASIC METABOLIC PNL TOTAL CA: CPT

## 2019-01-21 PROCEDURE — 36415 COLL VENOUS BLD VENIPUNCTURE: CPT

## 2019-01-21 PROCEDURE — 77010033678 HC OXYGEN DAILY

## 2019-01-21 RX ORDER — DEXTROSE, SODIUM CHLORIDE, AND POTASSIUM CHLORIDE 5; .45; .075 G/100ML; G/100ML; G/100ML
50 INJECTION INTRAVENOUS CONTINUOUS
Status: DISCONTINUED | OUTPATIENT
Start: 2019-01-21 | End: 2019-02-01 | Stop reason: HOSPADM

## 2019-01-21 RX ADMIN — ONDANSETRON 4 MG: 2 INJECTION INTRAMUSCULAR; INTRAVENOUS at 18:51

## 2019-01-21 RX ADMIN — HYDROMORPHONE HYDROCHLORIDE 2 MG: 2 INJECTION, SOLUTION INTRAMUSCULAR; INTRAVENOUS; SUBCUTANEOUS at 18:51

## 2019-01-21 RX ADMIN — ONDANSETRON 4 MG: 2 INJECTION INTRAMUSCULAR; INTRAVENOUS at 23:36

## 2019-01-21 RX ADMIN — HYDROMORPHONE HYDROCHLORIDE 2 MG: 2 INJECTION, SOLUTION INTRAMUSCULAR; INTRAVENOUS; SUBCUTANEOUS at 08:38

## 2019-01-21 RX ADMIN — FOLIC ACID 1 MG: 1 TABLET ORAL at 08:38

## 2019-01-21 RX ADMIN — HEPARIN SODIUM 5000 UNITS: 5000 INJECTION INTRAVENOUS; SUBCUTANEOUS at 08:38

## 2019-01-21 RX ADMIN — PANTOPRAZOLE SODIUM 40 MG: 40 TABLET, DELAYED RELEASE ORAL at 08:38

## 2019-01-21 RX ADMIN — DIPHENHYDRAMINE HYDROCHLORIDE 25 MG: 50 INJECTION, SOLUTION INTRAMUSCULAR; INTRAVENOUS at 23:36

## 2019-01-21 RX ADMIN — DIPHENHYDRAMINE HYDROCHLORIDE 25 MG: 50 INJECTION, SOLUTION INTRAMUSCULAR; INTRAVENOUS at 08:38

## 2019-01-21 RX ADMIN — HEPARIN SODIUM 5000 UNITS: 5000 INJECTION INTRAVENOUS; SUBCUTANEOUS at 20:58

## 2019-01-21 RX ADMIN — DEXTROSE MONOHYDRATE, SODIUM CHLORIDE, AND POTASSIUM CHLORIDE 100 ML/HR: 50; 4.5; .745 INJECTION, SOLUTION INTRAVENOUS at 10:46

## 2019-01-21 RX ADMIN — ONDANSETRON 4 MG: 2 INJECTION INTRAMUSCULAR; INTRAVENOUS at 08:38

## 2019-01-21 RX ADMIN — Medication 10 ML: at 15:34

## 2019-01-21 RX ADMIN — Medication 10 ML: at 20:58

## 2019-01-21 RX ADMIN — HYDROMORPHONE HYDROCHLORIDE 2 MG: 2 INJECTION, SOLUTION INTRAMUSCULAR; INTRAVENOUS; SUBCUTANEOUS at 13:00

## 2019-01-21 RX ADMIN — ONDANSETRON 4 MG: 2 INJECTION INTRAMUSCULAR; INTRAVENOUS at 12:59

## 2019-01-21 RX ADMIN — DEXTROSE MONOHYDRATE, SODIUM CHLORIDE, AND POTASSIUM CHLORIDE 100 ML/HR: 50; 4.5; .745 INJECTION, SOLUTION INTRAVENOUS at 21:05

## 2019-01-21 RX ADMIN — HYDROMORPHONE HYDROCHLORIDE 2 MG: 2 INJECTION, SOLUTION INTRAMUSCULAR; INTRAVENOUS; SUBCUTANEOUS at 15:34

## 2019-01-21 RX ADMIN — DIPHENHYDRAMINE HYDROCHLORIDE 25 MG: 50 INJECTION, SOLUTION INTRAMUSCULAR; INTRAVENOUS at 13:00

## 2019-01-21 RX ADMIN — DIPHENHYDRAMINE HYDROCHLORIDE 25 MG: 50 INJECTION, SOLUTION INTRAMUSCULAR; INTRAVENOUS at 02:54

## 2019-01-21 RX ADMIN — ONDANSETRON 4 MG: 2 INJECTION INTRAMUSCULAR; INTRAVENOUS at 02:54

## 2019-01-21 RX ADMIN — HYDROMORPHONE HYDROCHLORIDE 2 MG: 2 INJECTION, SOLUTION INTRAMUSCULAR; INTRAVENOUS; SUBCUTANEOUS at 10:59

## 2019-01-21 RX ADMIN — SODIUM CHLORIDE AND POTASSIUM CHLORIDE: 9; 1.49 INJECTION, SOLUTION INTRAVENOUS at 01:01

## 2019-01-21 RX ADMIN — DIPHENHYDRAMINE HYDROCHLORIDE 25 MG: 50 INJECTION, SOLUTION INTRAMUSCULAR; INTRAVENOUS at 18:51

## 2019-01-21 RX ADMIN — HYDROMORPHONE HYDROCHLORIDE 2 MG: 2 INJECTION, SOLUTION INTRAMUSCULAR; INTRAVENOUS; SUBCUTANEOUS at 01:20

## 2019-01-21 RX ADMIN — CITALOPRAM HYDROBROMIDE 10 MG: 20 TABLET ORAL at 08:38

## 2019-01-21 RX ADMIN — HYDROMORPHONE HYDROCHLORIDE 2 MG: 2 INJECTION, SOLUTION INTRAMUSCULAR; INTRAVENOUS; SUBCUTANEOUS at 20:58

## 2019-01-21 RX ADMIN — HEPARIN SODIUM 5000 UNITS: 5000 INJECTION INTRAVENOUS; SUBCUTANEOUS at 01:01

## 2019-01-21 NOTE — PROGRESS NOTES
General Daily Progress Note Admit Date: 1/19/2019 Subjective:  
 
Patient complains of back and shoulder leg pain. Current Facility-Administered Medications Medication Dose Route Frequency  dextrose 5 % - 0.45% NaCl 1,000 mL with potassium chloride 10 mEq infusion   IntraVENous CONTINUOUS  
 HYDROmorphone (PF) (DILAUDID) injection 1-2 mg  1-2 mg IntraVENous Q2H PRN  
 citalopram (CELEXA) tablet 10 mg  10 mg Oral DAILY  pantoprazole (PROTONIX) tablet 40 mg  40 mg Oral ACB  folic acid (FOLVITE) tablet 1 mg  1 mg Oral DAILY  nitroglycerin (NITROBID) 2 % ointment 2 Inch  2 Inch Topical Q6H PRN  
 diphenhydrAMINE (BENADRYL) injection 25 mg  25 mg IntraVENous Q4H PRN  
 sodium chloride (NS) flush 5-40 mL  5-40 mL IntraVENous Q8H  
 sodium chloride (NS) flush 5-40 mL  5-40 mL IntraVENous PRN  
 naloxone (NARCAN) injection 0.4 mg  0.4 mg IntraVENous PRN  
 ondansetron (ZOFRAN) injection 4 mg  4 mg IntraVENous Q4H PRN  
 bisacodyl (DULCOLAX) suppository 10 mg  10 mg Rectal DAILY PRN  
 heparin (porcine) injection 5,000 Units  5,000 Units SubCUTAneous Q8H Review of Systems A comprehensive review of systems was negative. Objective:  
 
Patient Vitals for the past 24 hrs: 
 BP Temp Pulse Resp SpO2  
01/21/19 0730 119/67 98.8 °F (37.1 °C) 87 18 100 % 01/20/19 2324 112/67 98.7 °F (37.1 °C) 93 18 96 % 01/20/19 2002 113/63 99 °F (37.2 °C) 98 16 100 % 01/20/19 1548 108/61 98.7 °F (37.1 °C) 86 18 99 % No intake/output data recorded. 01/19 1901 - 01/21 0700 In: -  
Out: 500 Physical Exam:  
Visit Vitals /67 (BP 1 Location: Right arm, BP Patient Position: At rest) Pulse 87 Temp 98.8 °F (37.1 °C) Resp 18 Ht 5' 10\" (1.778 m) Wt 175 lb 14.8 oz (79.8 kg) SpO2 100% BMI 25.24 kg/m² General appearance: alert, cooperative, no distress, appears stated age Neck: supple, symmetrical, trachea midline, no adenopathy, thyroid: not enlarged, symmetric, no tenderness/mass/nodules, no carotid bruit and no JVD Lungs: clear to auscultation bilaterally Heart: regular rate and rhythm, S1, S2 normal, no murmur, click, rub or gallop Abdomen: soft, non-tender. Bowel sounds normal. No masses,  no organomegaly Extremities: extremities normal, atraumatic, no cyanosis or edema Data Review Recent Results (from the past 24 hour(s)) CBC WITH AUTOMATED DIFF Collection Time: 01/21/19  2:58 AM  
Result Value Ref Range WBC 9.7 3.6 - 11.0 K/uL  
 RBC 2.49 (L) 3.80 - 5.20 M/uL HGB 7.4 (L) 11.5 - 16.0 g/dL HCT 21.9 (L) 35.0 - 47.0 % MCV 88.0 80.0 - 99.0 FL  
 MCH 29.7 26.0 - 34.0 PG  
 MCHC 33.8 30.0 - 36.5 g/dL  
 RDW 17.8 (H) 11.5 - 14.5 % PLATELET 549 797 - 144 K/uL MPV 11.9 8.9 - 12.9 FL  
 NRBC 0.3 (H) 0  WBC ABSOLUTE NRBC 0.03 (H) 0.00 - 0.01 K/uL NEUTROPHILS 25 (L) 32 - 75 % LYMPHOCYTES 64 (H) 12 - 49 % MONOCYTES 6 5 - 13 % EOSINOPHILS 4 0 - 7 % BASOPHILS 1 0 - 1 % IMMATURE GRANULOCYTES 0 0.0 - 0.5 % ABS. NEUTROPHILS 2.4 1.8 - 8.0 K/UL  
 ABS. LYMPHOCYTES 6.2 (H) 0.8 - 3.5 K/UL  
 ABS. MONOCYTES 0.6 0.0 - 1.0 K/UL  
 ABS. EOSINOPHILS 0.4 0.0 - 0.4 K/UL  
 ABS. BASOPHILS 0.1 0.0 - 0.1 K/UL  
 ABS. IMM. GRANS. 0.0 0.00 - 0.04 K/UL  
 DF MANUAL    
 RBC COMMENTS ANISOCYTOSIS 1+ 
    
 RBC COMMENTS HYPOCHROMIA 1+ 
    
 RBC COMMENTS TARGET CELLS 
1+ METABOLIC PANEL, BASIC Collection Time: 01/21/19  2:58 AM  
Result Value Ref Range Sodium 139 136 - 145 mmol/L Potassium 3.8 3.5 - 5.1 mmol/L Chloride 105 97 - 108 mmol/L  
 CO2 28 21 - 32 mmol/L Anion gap 6 5 - 15 mmol/L Glucose 109 (H) 65 - 100 mg/dL BUN 9 6 - 20 MG/DL Creatinine 0.79 0.55 - 1.02 MG/DL  
 BUN/Creatinine ratio 11 (L) 12 - 20 GFR est AA >60 >60 ml/min/1.73m2 GFR est non-AA >60 >60 ml/min/1.73m2 Calcium 8.3 (L) 8.5 - 10.1 MG/DL Assessment:  
 
Principal Problem: Sickle cell pain crisis ([facility] (9/8/2018) Active Problems: 
  Sickle cell anemia ([facility] (10/3/2014) Hypertension (10/3/2014) Depression (10/5/2014) Plan: 1. Patient admitted for painful crises. No atypicality for now. 2.  Continue supportive measures.

## 2019-01-21 NOTE — PROGRESS NOTES
Problem: Falls - Risk of 
Goal: *Absence of Falls Document Keira Arenas Fall Risk and appropriate interventions in the flowsheet. Outcome: Progressing Towards Goal 
Fall Risk Interventions: 
  
 
  
 
Medication Interventions: Assess postural VS orthostatic hypotension, Patient to call before getting OOB, Teach patient to arise slowly

## 2019-01-21 NOTE — PROGRESS NOTES
TATUM Sorenson notified of patient's readmission to [facility]/ Jessica Grimes of Care Management

## 2019-01-21 NOTE — PROGRESS NOTES
Physical Therapy 1/21/2019 Orders received and acknowledged. Chart reviewed and spoke with RN. Pt has been up ad erum without difficulty. Spoke with pt who was sitting up in bed and c/o only minimal pain. Endorses no difficulty with mobility. As pt is INDEP will sign off at this time.  
 
Thank Kishan Rawls, PT, DPT

## 2019-01-21 NOTE — PROGRESS NOTES
Oncology Nursing Communication Tool 2:50 PM 
1/21/2019 Bedside shift change report given to SAMIR Barbosa (incoming nurse) by Sofi Fan (outgoing nurse) on Maretta Pilot. Report included the following information SBAR. Shift Summary: Pt rested quietly and did not display any signs of distress or discomfort, however, she rates her pain 8 out of 10 to the groin, knees, and elbows. Pt  requested PRN medications Q2 and Q4 hours. Issues for physician to address: Oncology Shift Note Admission Date 1/19/2019 Admission Diagnosis Sickle cell pain crisis ([facility] [address] Code Status Full Code Consults None Cardiac Monitoring [] Yes [] No  
  
Purposeful Hourly Rounding [] Yes   
Mukund Score Total Score: 1 Mukund score 3 or > [] Bed Alarm [] Avasys [] 1:1 sitter [] Patient refused (Place signed refusal form in chart) Pain Managed [] Yes [] No  
 Key Pain Meds HYDROcodone-acetaminophen (NORCO)  mg tablet Take 1 Tab by mouth every six (6) hours as needed. Max Daily Amount: 4 Tabs. fentaNYL (DURAGESIC) 75 mcg/hr 1 Patch by TransDERmal route every seventy-two (72) hours. Max Daily Amount: 1 Patch. Influenza Vaccine Received Flu Vaccine for Current Season (usually Sept-March): Yes Patient/Guardian Refused (Notify MD): No  
  
Oxygen needs? [] Room air Oxygen @  []1L    []2L    []3L   []4L    []5L   []6L Use home O2? [] Yes [] No 
Perform O2 challenge test using  smartphrase (.oxygenchallenge) Last bowel movement Last Bowel Movement Date: 01/15/19 
bowel movement Urinary Catheter LDAs Peripheral IV 01/19/19 Left Antecubital (Active) Site Assessment Clean, dry, & intact 1/21/2019  8:00 AM  
Phlebitis Assessment 0 1/21/2019  8:00 AM  
Infiltration Assessment 0 1/21/2019  8:00 AM  
Dressing Status Clean, dry, & intact 1/21/2019  8:00 AM  
Dressing Type Transparent;Tape 1/21/2019  8:00 AM  
 Hub Color/Line Status Pink; Infusing 1/21/2019  8:00 AM  
Action Taken Open ports on tubing capped 1/19/2019  9:20 PM  
Alcohol Cap Used Yes 1/19/2019  9:20 PM  
                  
  
Readmission Risk Assessment Tool Score Medium Risk   
      
 19 Total Score 3 Has Seen PCP in Last 6 Months (Yes=3, No=0)  
 9 IP Visits Last 12 Months (1-3=4, 4=9, >4=11) 5 Pt. Coverage (Medicare=5 , Medicaid, or Self-Pay=4) 2 Charlson Comorbidity Score (Age + Comorbid Conditions) Criteria that do not apply:  
 . Living with Significant Other. Assisted Living. LTAC. SNF. or  
Rehab Patient Length of Stay (>5 days = 3) Expected Length of Stay 2d 16h Actual Length of Stay 2 [place]

## 2019-01-21 NOTE — PROGRESS NOTES
Oncology Nursing Communication Tool 
7:21 AM 
1/21/2019 Bedside shift change report given to [facility], RN (incoming nurse) by Sebastián Murillo RN (outgoing nurse) on Veda Saini. Report included the following information SBAR, Kardex, MAR and Accordion. Shift Summary: Pt frequently requesting pain med but falling asleep while pain medicine is being pushed Issues for physician to address: Frequency of pain med, parameters to hold when patient sedated Oncology Shift Note Admission Date 1/19/2019 Admission Diagnosis Sickle cell pain crisis ([facility] [address] Code Status Full Code Consults None Cardiac Monitoring [] Yes [x] No  
  
Purposeful Hourly Rounding [x] Yes   
Mukund Score Total Score: 1 Mukund score 3 or > [] Bed Alarm [] Avasys [] 1:1 sitter [] Patient refused (Place signed refusal form in chart) Pain Managed [x] Yes [] No  
 Key Pain Meds HYDROcodone-acetaminophen (NORCO)  mg tablet Take 1 Tab by mouth every six (6) hours as needed. Max Daily Amount: 4 Tabs. fentaNYL (DURAGESIC) 75 mcg/hr 1 Patch by TransDERmal route every seventy-two (72) hours. Max Daily Amount: 1 Patch. Influenza Vaccine Received Flu Vaccine for Current Season (usually Sept-March): Yes Patient/Guardian Refused (Notify MD): No  
  
Oxygen needs? [] Room air Oxygen @  []1L    [x]2L    []3L   []4L    []5L   []6L Use home O2? [] Yes [x] No 
Perform O2 challenge test using  smartphrase (.oxygenchallenge) Last bowel movement Last Bowel Movement Date: 01/15/19 
bowel movement Urinary Catheter LDAs Peripheral IV 01/19/19 Left Antecubital (Active) Site Assessment Clean, dry, & intact 1/21/2019  3:04 AM  
Phlebitis Assessment 0 1/21/2019  3:04 AM  
Infiltration Assessment 0 1/21/2019  3:04 AM  
Dressing Status Clean, dry, & intact 1/21/2019  3:04 AM  
Dressing Type Transparent;Tape 1/21/2019  3:04 AM  
 Hub Color/Line Status Pink;Patent; Infusing 1/21/2019  3:04 AM  
Action Taken Open ports on tubing capped 1/19/2019  9:20 PM  
Alcohol Cap Used Yes 1/19/2019  9:20 PM  
                  
  
Readmission Risk Assessment Tool Score Medium Risk   
      
 19 Total Score 3 Has Seen PCP in Last 6 Months (Yes=3, No=0)  
 9 IP Visits Last 12 Months (1-3=4, 4=9, >4=11) 5 Pt. Coverage (Medicare=5 , Medicaid, or Self-Pay=4) 2 Charlson Comorbidity Score (Age + Comorbid Conditions) Criteria that do not apply:  
 . Living with Significant Other. Assisted Living. LTAC. SNF. or  
Rehab Patient Length of Stay (>5 days = 3) Expected Length of Stay - - - Actual Length of Stay 2 Cookie Nunn RN

## 2019-01-22 LAB
ALBUMIN SERPL-MCNC: 3.3 G/DL (ref 3.5–5)
ALBUMIN/GLOB SERPL: 0.7 {RATIO} (ref 1.1–2.2)
ALP SERPL-CCNC: 111 U/L (ref 45–117)
ALT SERPL-CCNC: 19 U/L (ref 12–78)
ANION GAP SERPL CALC-SCNC: 3 MMOL/L (ref 5–15)
AST SERPL-CCNC: 30 U/L (ref 15–37)
BASOPHILS # BLD: 0 K/UL (ref 0–0.1)
BASOPHILS NFR BLD: 0 % (ref 0–1)
BILIRUB SERPL-MCNC: 1 MG/DL (ref 0.2–1)
BLASTS NFR BLD MANUAL: 0 %
BUN SERPL-MCNC: 4 MG/DL (ref 6–20)
BUN/CREAT SERPL: 6 (ref 12–20)
CALCIUM SERPL-MCNC: 8.6 MG/DL (ref 8.5–10.1)
CHLORIDE SERPL-SCNC: 107 MMOL/L (ref 97–108)
CO2 SERPL-SCNC: 28 MMOL/L (ref 21–32)
CREAT SERPL-MCNC: 0.69 MG/DL (ref 0.55–1.02)
DIFFERENTIAL METHOD BLD: ABNORMAL
EOSINOPHIL # BLD: 0.1 K/UL (ref 0–0.4)
EOSINOPHIL NFR BLD: 1 % (ref 0–7)
ERYTHROCYTE [DISTWIDTH] IN BLOOD BY AUTOMATED COUNT: 18.9 % (ref 11.5–14.5)
GLOBULIN SER CALC-MCNC: 4.7 G/DL (ref 2–4)
GLUCOSE SERPL-MCNC: 88 MG/DL (ref 65–100)
HCT VFR BLD AUTO: 23.1 % (ref 35–47)
HGB BLD-MCNC: 7.3 G/DL (ref 11.5–16)
IMM GRANULOCYTES # BLD AUTO: 0 K/UL
IMM GRANULOCYTES NFR BLD AUTO: 0 %
LYMPHOCYTES # BLD: 7.7 K/UL (ref 0.8–3.5)
LYMPHOCYTES NFR BLD: 70 % (ref 12–49)
MCH RBC QN AUTO: 29.8 PG (ref 26–34)
MCHC RBC AUTO-ENTMCNC: 31.6 G/DL (ref 30–36.5)
MCV RBC AUTO: 94.3 FL (ref 80–99)
METAMYELOCYTES NFR BLD MANUAL: 1 %
MONOCYTES # BLD: 0.8 K/UL (ref 0–1)
MONOCYTES NFR BLD: 7 % (ref 5–13)
MYELOCYTES NFR BLD MANUAL: 0 %
NEUTS BAND NFR BLD MANUAL: 0 % (ref 0–6)
NEUTS SEG # BLD: 2.3 K/UL (ref 1.8–8)
NEUTS SEG NFR BLD: 21 % (ref 32–75)
NRBC # BLD: 0.02 K/UL (ref 0–0.01)
NRBC BLD-RTO: 0.2 PER 100 WBC
OTHER CELLS NFR BLD MANUAL: 0 %
PLATELET # BLD AUTO: 155 K/UL (ref 150–400)
PMV BLD AUTO: 11.8 FL (ref 8.9–12.9)
POTASSIUM SERPL-SCNC: 4 MMOL/L (ref 3.5–5.1)
PROMYELOCYTES NFR BLD MANUAL: 0 %
PROT SERPL-MCNC: 8 G/DL (ref 6.4–8.2)
RBC # BLD AUTO: 2.45 M/UL (ref 3.8–5.2)
RBC MORPH BLD: ABNORMAL
SODIUM SERPL-SCNC: 138 MMOL/L (ref 136–145)
WBC # BLD AUTO: 11 K/UL (ref 3.6–11)

## 2019-01-22 PROCEDURE — 74011250636 HC RX REV CODE- 250/636: Performed by: INTERNAL MEDICINE

## 2019-01-22 PROCEDURE — C1751 CATH, INF, PER/CENT/MIDLINE: HCPCS

## 2019-01-22 PROCEDURE — 77010033678 HC OXYGEN DAILY

## 2019-01-22 PROCEDURE — 76937 US GUIDE VASCULAR ACCESS: CPT

## 2019-01-22 PROCEDURE — 85027 COMPLETE CBC AUTOMATED: CPT

## 2019-01-22 PROCEDURE — 77030018786 HC NDL GD F/USND BARD -B

## 2019-01-22 PROCEDURE — 80053 COMPREHEN METABOLIC PANEL: CPT

## 2019-01-22 PROCEDURE — 74011250637 HC RX REV CODE- 250/637: Performed by: INTERNAL MEDICINE

## 2019-01-22 PROCEDURE — 94760 N-INVAS EAR/PLS OXIMETRY 1: CPT

## 2019-01-22 PROCEDURE — 65270000015 HC RM PRIVATE ONCOLOGY

## 2019-01-22 PROCEDURE — 36415 COLL VENOUS BLD VENIPUNCTURE: CPT

## 2019-01-22 PROCEDURE — 05HY33Z INSERTION OF INFUSION DEVICE INTO UPPER VEIN, PERCUTANEOUS APPROACH: ICD-10-PCS | Performed by: INTERNAL MEDICINE

## 2019-01-22 PROCEDURE — 77030018719 HC DRSG PTCH ANTIMIC J&J -A

## 2019-01-22 RX ORDER — ENOXAPARIN SODIUM 100 MG/ML
40 INJECTION SUBCUTANEOUS EVERY 12 HOURS
Status: DISCONTINUED | OUTPATIENT
Start: 2019-01-22 | End: 2019-01-22 | Stop reason: DRUGHIGH

## 2019-01-22 RX ORDER — PANTOPRAZOLE SODIUM 40 MG/1
40 TABLET, DELAYED RELEASE ORAL
Status: DISCONTINUED | OUTPATIENT
Start: 2019-01-22 | End: 2019-02-01 | Stop reason: HOSPADM

## 2019-01-22 RX ORDER — ENOXAPARIN SODIUM 100 MG/ML
40 INJECTION SUBCUTANEOUS EVERY 24 HOURS
Status: DISCONTINUED | OUTPATIENT
Start: 2019-01-22 | End: 2019-02-01 | Stop reason: HOSPADM

## 2019-01-22 RX ADMIN — PANTOPRAZOLE SODIUM 40 MG: 40 TABLET, DELAYED RELEASE ORAL at 15:53

## 2019-01-22 RX ADMIN — HEPARIN SODIUM 5000 UNITS: 5000 INJECTION INTRAVENOUS; SUBCUTANEOUS at 04:32

## 2019-01-22 RX ADMIN — Medication 10 ML: at 02:39

## 2019-01-22 RX ADMIN — HYDROMORPHONE HYDROCHLORIDE 2 MG: 2 INJECTION, SOLUTION INTRAMUSCULAR; INTRAVENOUS; SUBCUTANEOUS at 09:37

## 2019-01-22 RX ADMIN — HYDROMORPHONE HYDROCHLORIDE 2 MG: 2 INJECTION, SOLUTION INTRAMUSCULAR; INTRAVENOUS; SUBCUTANEOUS at 20:51

## 2019-01-22 RX ADMIN — HYDROMORPHONE HYDROCHLORIDE 2 MG: 2 INJECTION, SOLUTION INTRAMUSCULAR; INTRAVENOUS; SUBCUTANEOUS at 18:01

## 2019-01-22 RX ADMIN — Medication 10 ML: at 04:32

## 2019-01-22 RX ADMIN — HYDROMORPHONE HYDROCHLORIDE 2 MG: 2 INJECTION, SOLUTION INTRAMUSCULAR; INTRAVENOUS; SUBCUTANEOUS at 02:39

## 2019-01-22 RX ADMIN — DEXTROSE MONOHYDRATE, SODIUM CHLORIDE, AND POTASSIUM CHLORIDE 100 ML/HR: 50; 4.5; .745 INJECTION, SOLUTION INTRAVENOUS at 10:55

## 2019-01-22 RX ADMIN — Medication 10 ML: at 20:52

## 2019-01-22 RX ADMIN — HYDROMORPHONE HYDROCHLORIDE 2 MG: 2 INJECTION, SOLUTION INTRAMUSCULAR; INTRAVENOUS; SUBCUTANEOUS at 15:50

## 2019-01-22 RX ADMIN — DIPHENHYDRAMINE HYDROCHLORIDE 25 MG: 50 INJECTION, SOLUTION INTRAMUSCULAR; INTRAVENOUS at 19:52

## 2019-01-22 RX ADMIN — ONDANSETRON 4 MG: 2 INJECTION INTRAMUSCULAR; INTRAVENOUS at 09:16

## 2019-01-22 RX ADMIN — ONDANSETRON 4 MG: 2 INJECTION INTRAMUSCULAR; INTRAVENOUS at 19:52

## 2019-01-22 RX ADMIN — DIPHENHYDRAMINE HYDROCHLORIDE 25 MG: 50 INJECTION, SOLUTION INTRAMUSCULAR; INTRAVENOUS at 09:16

## 2019-01-22 RX ADMIN — Medication 10 ML: at 00:37

## 2019-01-22 RX ADMIN — FOLIC ACID 1 MG: 1 TABLET ORAL at 09:36

## 2019-01-22 RX ADMIN — HYDROMORPHONE HYDROCHLORIDE 2 MG: 2 INJECTION, SOLUTION INTRAMUSCULAR; INTRAVENOUS; SUBCUTANEOUS at 06:22

## 2019-01-22 RX ADMIN — CITALOPRAM HYDROBROMIDE 10 MG: 20 TABLET ORAL at 09:36

## 2019-01-22 RX ADMIN — ENOXAPARIN SODIUM 40 MG: 40 INJECTION SUBCUTANEOUS at 10:57

## 2019-01-22 RX ADMIN — HYDROMORPHONE HYDROCHLORIDE 2 MG: 2 INJECTION, SOLUTION INTRAMUSCULAR; INTRAVENOUS; SUBCUTANEOUS at 00:37

## 2019-01-22 RX ADMIN — DIPHENHYDRAMINE HYDROCHLORIDE 25 MG: 50 INJECTION, SOLUTION INTRAMUSCULAR; INTRAVENOUS at 14:55

## 2019-01-22 RX ADMIN — ONDANSETRON 4 MG: 2 INJECTION INTRAMUSCULAR; INTRAVENOUS at 14:56

## 2019-01-22 RX ADMIN — PANTOPRAZOLE SODIUM 40 MG: 40 TABLET, DELAYED RELEASE ORAL at 06:22

## 2019-01-22 NOTE — PROGRESS NOTES
Bedside and Verbal shift change report given to Raghav Cam, RN (oncoming nurse) by Radha Schmidt RN (offgoing nurse). Report included the following information SBAR, Kardex, MAR and Recent Results. 200- Dr. Darlene Roth paged- PT IV has infiltrate. Per Patient she is a very hard stick and is requesting I call the MD to request a midline IV. Per Dr. Darlene Roth- midline is ok. PICC team called.

## 2019-01-22 NOTE — PROGRESS NOTES
Midline insertion procedure note: 
Pt has very limited vascular access. Procedure explained to patient along with risks and benefits. Procedure teaching completed. Pre procedure assessment done. Patient denies questions or concerns at this time. Midline information booklet left at bedside with patient. Midline sign over the [facility]. Maximum sterile barrier precautions observed throughout procedure. Lidocaine 1% 3ml sc injected to site prior to access the vein. Cannulated left brachial vein using ultrasound guidance. Inserted 3 Fr. single lumen midline to left arm. Blood return verified and flushed with 20ml normal saline. Sterile dressing applied with Biopatch, StatLock and occlusive dressing as per protocol. Curos caps applied to port. Patient tolerated procedure well with minimal blood loss. Reason for access : Reliable IV access Complications related to insertion : none This midline to be removed on or before 2/20/19 See nursing message on [email or web address] for midline reminders. Midline is CT and MRI compatible. Inserted by : Governor Noman RN [facility] Vascular Access Nurse Assisted by : Brooklyn Recinos RN, [facility] Vascular Access Nurse Catheter Length : 14 cm External Length : 0 cm Left arm circumference : 31 cm Catheter occupies 7% of vein. Type of Midline: BARD Ref#: [id number] Lot#: [id number] Expiration Date: 2019-11-30 Informed primary nurse Gerardo Lassiter RN midline is ready for use and to hang new infusion tubing prior to use. Governor Noman RN [facility] Vascular Access Nurse

## 2019-01-22 NOTE — PROGRESS NOTES
Bedside shift change report given to me (oncoming nurse) by Linh Poe RN (offgoing nurse). Report included the following information SBAR, Kardex, Intake/Output, MAR and Recent Results. Bedside shift change report given to [employer] (oncoming nurse) by me (offgoing nurse). Report included the following information SBAR, Kardex, Intake/Output, MAR and Recent Results.

## 2019-01-22 NOTE — PROGRESS NOTES
General Daily Progress Note Admit Date: 1/19/2019 Subjective:  
 
Patient continues to complain of pain. Arturo Calzada Current Facility-Administered Medications Medication Dose Route Frequency  pantoprazole (PROTONIX) tablet 40 mg  40 mg Oral ACB&D  
 enoxaparin (LOVENOX) injection 40 mg  40 mg SubCUTAneous Q12H  
 dextrose 5% - 0.45% NaCl with KCl 10 mEq/L infusion  100 mL/hr IntraVENous CONTINUOUS  
 HYDROmorphone (PF) (DILAUDID) injection 1-2 mg  1-2 mg IntraVENous Q2H PRN  
 citalopram (CELEXA) tablet 10 mg  10 mg Oral DAILY  folic acid (FOLVITE) tablet 1 mg  1 mg Oral DAILY  nitroglycerin (NITROBID) 2 % ointment 2 Inch  2 Inch Topical Q6H PRN  
 diphenhydrAMINE (BENADRYL) injection 25 mg  25 mg IntraVENous Q4H PRN  
 sodium chloride (NS) flush 5-40 mL  5-40 mL IntraVENous Q8H  
 sodium chloride (NS) flush 5-40 mL  5-40 mL IntraVENous PRN  
 naloxone (NARCAN) injection 0.4 mg  0.4 mg IntraVENous PRN  
 ondansetron (ZOFRAN) injection 4 mg  4 mg IntraVENous Q4H PRN  
 bisacodyl (DULCOLAX) suppository 10 mg  10 mg Rectal DAILY PRN Review of Systems A comprehensive review of systems was negative. Objective:  
 
Patient Vitals for the past 24 hrs: 
 BP Temp Pulse Resp SpO2  
01/22/19 0800 127/61 98.5 °F (36.9 °C) 87 18 100 % 01/21/19 2341 121/70 99 °F (37.2 °C) 95 16 100 % 01/21/19 1928 104/57 99 °F (37.2 °C) 92 18 100 % 01/21/19 1600 117/66 99.4 °F (37.4 °C) 88 18 100 % No intake/output data recorded. 01/20 1901 - 01/22 0700 In: 1283.3 [I.V.:1283.3] Out: - Physical Exam:  
Visit Vitals /61 (BP 1 Location: Right arm, BP Patient Position: Sitting) Pulse 87 Temp 98.5 °F (36.9 °C) Resp 18 Ht 5' 10\" (1.778 m) Wt 175 lb 14.8 oz (79.8 kg) SpO2 100% BMI 25.24 kg/m² General appearance: alert, cooperative, no distress, appears stated age Neck: supple, symmetrical, trachea midline, no adenopathy, thyroid: not enlarged, symmetric, no tenderness/mass/nodules, no carotid bruit and no JVD Lungs: clear to auscultation bilaterally Heart: regular rate and rhythm, S1, S2 normal, no murmur, click, rub or gallop Abdomen: soft, non-tender. Bowel sounds normal. No masses,  no organomegaly Extremities: extremities normal, atraumatic, no cyanosis or edema Assessment:  
 
Principal Problem: 
  Sickle cell pain crisis ([facility] (9/8/2018) Active Problems: 
  Sickle cell anemia ([facility] (10/3/2014) Hypertension (10/3/2014) Depression (10/5/2014) Plan: 1. Continue treatment of painful crises. Hemoglobin remains reasonable. 2.  Continue hydration.

## 2019-01-22 NOTE — PROGRESS NOTES
Spiritual Care Partner Volunteer visited patient in Oncology on January 22, 2019. Documented by: 
KRUNAL Gutierrez Div  Paging Service [phone]

## 2019-01-23 LAB
ALBUMIN SERPL-MCNC: 2.7 G/DL (ref 3.5–5)
ALBUMIN/GLOB SERPL: 0.6 {RATIO} (ref 1.1–2.2)
ALP SERPL-CCNC: 108 U/L (ref 45–117)
ALT SERPL-CCNC: 20 U/L (ref 12–78)
ANION GAP SERPL CALC-SCNC: 4 MMOL/L (ref 5–15)
AST SERPL-CCNC: 30 U/L (ref 15–37)
BASOPHILS # BLD: 0 K/UL (ref 0–0.1)
BASOPHILS NFR BLD: 0 % (ref 0–1)
BILIRUB SERPL-MCNC: 0.8 MG/DL (ref 0.2–1)
BLASTS NFR BLD MANUAL: 0 %
BUN SERPL-MCNC: 4 MG/DL (ref 6–20)
BUN/CREAT SERPL: 6 (ref 12–20)
CALCIUM SERPL-MCNC: 8.4 MG/DL (ref 8.5–10.1)
CHLORIDE SERPL-SCNC: 108 MMOL/L (ref 97–108)
CO2 SERPL-SCNC: 26 MMOL/L (ref 21–32)
CREAT SERPL-MCNC: 0.72 MG/DL (ref 0.55–1.02)
DIFFERENTIAL METHOD BLD: ABNORMAL
EOSINOPHIL # BLD: 0.1 K/UL (ref 0–0.4)
EOSINOPHIL NFR BLD: 1 % (ref 0–7)
ERYTHROCYTE [DISTWIDTH] IN BLOOD BY AUTOMATED COUNT: 18.3 % (ref 11.5–14.5)
GLOBULIN SER CALC-MCNC: 4.6 G/DL (ref 2–4)
GLUCOSE SERPL-MCNC: 100 MG/DL (ref 65–100)
HCT VFR BLD AUTO: 20.1 % (ref 35–47)
HGB BLD-MCNC: 6.5 G/DL (ref 11.5–16)
IMM GRANULOCYTES # BLD AUTO: 0 K/UL
IMM GRANULOCYTES NFR BLD AUTO: 0 %
LYMPHOCYTES # BLD: 6.3 K/UL (ref 0.8–3.5)
LYMPHOCYTES NFR BLD: 64 % (ref 12–49)
MCH RBC QN AUTO: 29.3 PG (ref 26–34)
MCHC RBC AUTO-ENTMCNC: 32.3 G/DL (ref 30–36.5)
MCV RBC AUTO: 90.5 FL (ref 80–99)
METAMYELOCYTES NFR BLD MANUAL: 0 %
MONOCYTES # BLD: 0.6 K/UL (ref 0–1)
MONOCYTES NFR BLD: 6 % (ref 5–13)
MYELOCYTES NFR BLD MANUAL: 0 %
NEUTS BAND NFR BLD MANUAL: 0 % (ref 0–6)
NEUTS SEG # BLD: 2.8 K/UL (ref 1.8–8)
NEUTS SEG NFR BLD: 29 % (ref 32–75)
NRBC # BLD: 0.05 K/UL (ref 0–0.01)
NRBC BLD-RTO: 0.5 PER 100 WBC
OTHER CELLS NFR BLD MANUAL: 0 %
PLATELET # BLD AUTO: 169 K/UL (ref 150–400)
PMV BLD AUTO: 11.3 FL (ref 8.9–12.9)
POTASSIUM SERPL-SCNC: 4.2 MMOL/L (ref 3.5–5.1)
PROMYELOCYTES NFR BLD MANUAL: 0 %
PROT SERPL-MCNC: 7.3 G/DL (ref 6.4–8.2)
RBC # BLD AUTO: 2.22 M/UL (ref 3.8–5.2)
RBC MORPH BLD: ABNORMAL
SODIUM SERPL-SCNC: 138 MMOL/L (ref 136–145)
WBC # BLD AUTO: 9.8 K/UL (ref 3.6–11)

## 2019-01-23 PROCEDURE — 85027 COMPLETE CBC AUTOMATED: CPT

## 2019-01-23 PROCEDURE — 77010033678 HC OXYGEN DAILY

## 2019-01-23 PROCEDURE — 94760 N-INVAS EAR/PLS OXIMETRY 1: CPT

## 2019-01-23 PROCEDURE — 74011250636 HC RX REV CODE- 250/636: Performed by: INTERNAL MEDICINE

## 2019-01-23 PROCEDURE — 65270000015 HC RM PRIVATE ONCOLOGY

## 2019-01-23 PROCEDURE — 36415 COLL VENOUS BLD VENIPUNCTURE: CPT

## 2019-01-23 PROCEDURE — 74011250637 HC RX REV CODE- 250/637: Performed by: INTERNAL MEDICINE

## 2019-01-23 PROCEDURE — 80053 COMPREHEN METABOLIC PANEL: CPT

## 2019-01-23 RX ADMIN — DEXTROSE MONOHYDRATE, SODIUM CHLORIDE, AND POTASSIUM CHLORIDE 100 ML/HR: 50; 4.5; .745 INJECTION, SOLUTION INTRAVENOUS at 21:05

## 2019-01-23 RX ADMIN — HYDROMORPHONE HYDROCHLORIDE 2 MG: 2 INJECTION, SOLUTION INTRAMUSCULAR; INTRAVENOUS; SUBCUTANEOUS at 20:49

## 2019-01-23 RX ADMIN — FOLIC ACID 1 MG: 1 TABLET ORAL at 09:03

## 2019-01-23 RX ADMIN — DIPHENHYDRAMINE HYDROCHLORIDE 25 MG: 50 INJECTION, SOLUTION INTRAMUSCULAR; INTRAVENOUS at 02:31

## 2019-01-23 RX ADMIN — ONDANSETRON 4 MG: 2 INJECTION INTRAMUSCULAR; INTRAVENOUS at 15:56

## 2019-01-23 RX ADMIN — HYDROMORPHONE HYDROCHLORIDE 1 MG: 2 INJECTION, SOLUTION INTRAMUSCULAR; INTRAVENOUS; SUBCUTANEOUS at 12:48

## 2019-01-23 RX ADMIN — ONDANSETRON 4 MG: 2 INJECTION INTRAMUSCULAR; INTRAVENOUS at 22:11

## 2019-01-23 RX ADMIN — DEXTROSE MONOHYDRATE, SODIUM CHLORIDE, AND POTASSIUM CHLORIDE 100 ML/HR: 50; 4.5; .745 INJECTION, SOLUTION INTRAVENOUS at 11:01

## 2019-01-23 RX ADMIN — Medication 10 ML: at 02:31

## 2019-01-23 RX ADMIN — DIPHENHYDRAMINE HYDROCHLORIDE 25 MG: 50 INJECTION, SOLUTION INTRAMUSCULAR; INTRAVENOUS at 22:12

## 2019-01-23 RX ADMIN — PANTOPRAZOLE SODIUM 40 MG: 40 TABLET, DELAYED RELEASE ORAL at 09:03

## 2019-01-23 RX ADMIN — CITALOPRAM HYDROBROMIDE 10 MG: 20 TABLET ORAL at 09:03

## 2019-01-23 RX ADMIN — ONDANSETRON 4 MG: 2 INJECTION INTRAMUSCULAR; INTRAVENOUS at 02:31

## 2019-01-23 RX ADMIN — HYDROMORPHONE HYDROCHLORIDE 2 MG: 2 INJECTION, SOLUTION INTRAMUSCULAR; INTRAVENOUS; SUBCUTANEOUS at 01:03

## 2019-01-23 RX ADMIN — HYDROMORPHONE HYDROCHLORIDE 2 MG: 2 INJECTION, SOLUTION INTRAMUSCULAR; INTRAVENOUS; SUBCUTANEOUS at 06:56

## 2019-01-23 RX ADMIN — ENOXAPARIN SODIUM 40 MG: 40 INJECTION SUBCUTANEOUS at 09:03

## 2019-01-23 RX ADMIN — ONDANSETRON 4 MG: 2 INJECTION INTRAMUSCULAR; INTRAVENOUS at 09:03

## 2019-01-23 RX ADMIN — HYDROMORPHONE HYDROCHLORIDE 2 MG: 2 INJECTION, SOLUTION INTRAMUSCULAR; INTRAVENOUS; SUBCUTANEOUS at 15:15

## 2019-01-23 RX ADMIN — DIPHENHYDRAMINE HYDROCHLORIDE 25 MG: 50 INJECTION, SOLUTION INTRAMUSCULAR; INTRAVENOUS at 09:02

## 2019-01-23 RX ADMIN — Medication 10 ML: at 12:51

## 2019-01-23 RX ADMIN — DIPHENHYDRAMINE HYDROCHLORIDE 25 MG: 50 INJECTION, SOLUTION INTRAMUSCULAR; INTRAVENOUS at 15:56

## 2019-01-23 RX ADMIN — HYDROMORPHONE HYDROCHLORIDE 1 MG: 2 INJECTION, SOLUTION INTRAMUSCULAR; INTRAVENOUS; SUBCUTANEOUS at 10:08

## 2019-01-23 RX ADMIN — HYDROMORPHONE HYDROCHLORIDE 2 MG: 2 INJECTION, SOLUTION INTRAMUSCULAR; INTRAVENOUS; SUBCUTANEOUS at 18:29

## 2019-01-23 RX ADMIN — DEXTROSE MONOHYDRATE, SODIUM CHLORIDE, AND POTASSIUM CHLORIDE 100 ML/HR: 50; 4.5; .745 INJECTION, SOLUTION INTRAVENOUS at 00:19

## 2019-01-23 RX ADMIN — PANTOPRAZOLE SODIUM 40 MG: 40 TABLET, DELAYED RELEASE ORAL at 15:56

## 2019-01-23 RX ADMIN — Medication 10 ML: at 20:49

## 2019-01-23 NOTE — PROGRESS NOTES
General Daily Progress Note Admit Date: 1/19/2019 Subjective:  
 
Patient continues to complain of pain but no worse. Current Facility-Administered Medications Medication Dose Route Frequency  pantoprazole (PROTONIX) tablet 40 mg  40 mg Oral ACB&D  
 enoxaparin (LOVENOX) injection 40 mg  40 mg SubCUTAneous Q24H  
 dextrose 5% - 0.45% NaCl with KCl 10 mEq/L infusion  100 mL/hr IntraVENous CONTINUOUS  
 HYDROmorphone (PF) (DILAUDID) injection 1-2 mg  1-2 mg IntraVENous Q2H PRN  
 citalopram (CELEXA) tablet 10 mg  10 mg Oral DAILY  folic acid (FOLVITE) tablet 1 mg  1 mg Oral DAILY  nitroglycerin (NITROBID) 2 % ointment 2 Inch  2 Inch Topical Q6H PRN  
 diphenhydrAMINE (BENADRYL) injection 25 mg  25 mg IntraVENous Q4H PRN  
 sodium chloride (NS) flush 5-40 mL  5-40 mL IntraVENous Q8H  
 sodium chloride (NS) flush 5-40 mL  5-40 mL IntraVENous PRN  
 naloxone (NARCAN) injection 0.4 mg  0.4 mg IntraVENous PRN  
 ondansetron (ZOFRAN) injection 4 mg  4 mg IntraVENous Q4H PRN  
 bisacodyl (DULCOLAX) suppository 10 mg  10 mg Rectal DAILY PRN Review of Systems A comprehensive review of systems was negative. Objective:  
 
Patient Vitals for the past 24 hrs: 
 BP Temp Pulse Resp SpO2  
01/23/19 0747 120/71 98.5 °F (36.9 °C) 87 18 100 % 01/23/19 0020 140/79 98.3 °F (36.8 °C) 93 18 96 % 01/22/19 1913 126/68 99.5 °F (37.5 °C) 93 16 100 % 01/22/19 1500 116/62 99.4 °F (37.4 °C) 92 16 100 % No intake/output data recorded. 01/21 1901 - 01/23 0700 In: 3756.7 [I.V.:3756.7] Out: - Physical Exam:  
Visit Vitals /71 (BP 1 Location: Right arm, BP Patient Position: At rest) Pulse 87 Temp 98.5 °F (36.9 °C) Resp 18 Ht 5' 10\" (1.778 m) Wt 175 lb 14.8 oz (79.8 kg) SpO2 100% BMI 25.24 kg/m² General appearance: alert, cooperative, no distress, appears stated age Neck: supple, symmetrical, trachea midline, no adenopathy, thyroid: not enlarged, symmetric, no tenderness/mass/nodules, no carotid bruit and no JVD Lungs: clear to auscultation bilaterally Heart: regular rate and rhythm, S1, S2 normal, no murmur, click, rub or gallop Abdomen: soft, non-tender. Bowel sounds normal. No masses,  no organomegaly Extremities: extremities normal, atraumatic, no cyanosis or edema Data Review Recent Results (from the past 24 hour(s)) CBC WITH MANUAL DIFF Collection Time: 01/23/19  2:33 AM  
Result Value Ref Range WBC 9.8 3.6 - 11.0 K/uL  
 RBC 2.22 (L) 3.80 - 5.20 M/uL HGB 6.5 (L) 11.5 - 16.0 g/dL HCT 20.1 (L) 35.0 - 47.0 % MCV 90.5 80.0 - 99.0 FL  
 MCH 29.3 26.0 - 34.0 PG  
 MCHC 32.3 30.0 - 36.5 g/dL  
 RDW 18.3 (H) 11.5 - 14.5 % PLATELET 062 056 - 250 K/uL MPV 11.3 8.9 - 12.9 FL  
 NRBC 0.5 (H) 0  WBC ABSOLUTE NRBC 0.05 (H) 0.00 - 0.01 K/uL NEUTROPHILS 29 (L) 32 - 75 % BAND NEUTROPHILS 0 0 - 6 % LYMPHOCYTES 64 (H) 12 - 49 % MONOCYTES 6 5 - 13 % EOSINOPHILS 1 0 - 7 % BASOPHILS 0 0 - 1 % METAMYELOCYTES 0 0 % MYELOCYTES 0 0 % PROMYELOCYTES 0 0 % BLASTS 0 0 % OTHER CELL 0 0 IMMATURE GRANULOCYTES 0 %  
 ABS. NEUTROPHILS 2.8 1.8 - 8.0 K/UL  
 ABS. LYMPHOCYTES 6.3 (H) 0.8 - 3.5 K/UL  
 ABS. MONOCYTES 0.6 0.0 - 1.0 K/UL  
 ABS. EOSINOPHILS 0.1 0.0 - 0.4 K/UL  
 ABS. BASOPHILS 0.0 0.0 - 0.1 K/UL  
 ABS. IMM. GRANS. 0.0 K/UL  
 DF MANUAL    
 RBC COMMENTS ANISOCYTOSIS 1+ 
    
 RBC COMMENTS POIKILOCYTOSIS 1+ 
    
 RBC COMMENTS TARGET CELLS 1+ RBC COMMENTS SICKLE CELLS 
PRESENT 
    
METABOLIC PANEL, COMPREHENSIVE Collection Time: 01/23/19  2:33 AM  
Result Value Ref Range Sodium 138 136 - 145 mmol/L Potassium 4.2 3.5 - 5.1 mmol/L Chloride 108 97 - 108 mmol/L  
 CO2 26 21 - 32 mmol/L Anion gap 4 (L) 5 - 15 mmol/L Glucose 100 65 - 100 mg/dL BUN 4 (L) 6 - 20 MG/DL  Creatinine 0.72 0.55 - 1.02 MG/DL  
 BUN/Creatinine ratio 6 (L) 12 - 20    
 GFR est AA >60 >60 ml/min/1.73m2 GFR est non-AA >60 >60 ml/min/1.73m2 Calcium 8.4 (L) 8.5 - 10.1 MG/DL Bilirubin, total 0.8 0.2 - 1.0 MG/DL  
 ALT (SGPT) 20 12 - 78 U/L  
 AST (SGOT) 30 15 - 37 U/L Alk. phosphatase 108 45 - 117 U/L Protein, total 7.3 6.4 - 8.2 g/dL Albumin 2.7 (L) 3.5 - 5.0 g/dL Globulin 4.6 (H) 2.0 - 4.0 g/dL A-G Ratio 0.6 (L) 1.1 - 2.2 Assessment:  
 
Principal Problem: 
  Sickle cell pain crisis ([facility] (9/8/2018) Active Problems: 
  Sick[facility] anemia ([facility] (10/3/2014) Hypertension (10/3/2014) Depression (10/5/2014) Plan: 1. Continue treatment of painful crises. Patient has declined hydroxyurea and newer agent. Supportive measures until crises resolved. Patient is also very conservative when it comes to transfusions been utilizing only as a last resort. 2.  Will mobilize.

## 2019-01-23 NOTE — PROGRESS NOTES
Oncology End of Shift Note Bedside shift change report given to Rashard Cole RN (incoming nurse) by Amaris Fernandez RN (outgoing nurse) on [facility]. Report included the following information SBAR, Kardex, Intake/Output, MAR and Recent Results. Shift Summary: Pt requesting pain med, zofran, and benadryl every few hours yet is very drowsy at times and has fallen back asleepafter requesting pain meds. Started pt on continuous pulse ox per Dr Dalia Logan. Issues for Physician to Address:  Pt requesting all prn meds (pain, benadryl, zofran) together. Pt is very drowsy throughout the evening. Patient on Cardiac Monitoring?  no 
[] Yes 
[x] No 
 
Rhythm: Telemetry: normal sinus rhythm Shift Events Uneventful evening see prn mar. Started pt on continuous pulse ox.  
 
 
Amaris Fernandez RN

## 2019-01-23 NOTE — PROGRESS NOTES
Late Entry Note: 
 
CM spoke to Dr. Jennyfer Husain by telephone on 1/22/2019  to discuss patient's current hospitalization. Requested that PO pain medications be offered, but Dr. Jennyfer Husain feels patient needs to wait for now and be better controlled with pain prior to eliminating IV meds or offering PO. Tomás Hardy RN, BSN, ACM [facility]   [phone]

## 2019-01-23 NOTE — PROGRESS NOTES
Oncology End of Shift Note Bedside shift change report given to Jessica Laboy (incoming nurse) by Berenice Bruce RN (outgoing nurse) on [place] [facility]. Report included the following information SBAR, Kardex, MAR and Accordion. Shift Summary: Medicated for pain once. PT very drowsy Issues for Physician to Address:  PT asking for pain med but constantly falling asleep before you come back with pain med and even while its being pushed Patient on Cardiac Monitoring? [] Yes 
[x] No 
 
Rhythm:  
 
 
 
Shift Events Berenice Bruce RN

## 2019-01-24 LAB
ALBUMIN SERPL-MCNC: 2.7 G/DL (ref 3.5–5)
ALBUMIN/GLOB SERPL: 0.7 {RATIO} (ref 1.1–2.2)
ALP SERPL-CCNC: 108 U/L (ref 45–117)
ALT SERPL-CCNC: 16 U/L (ref 12–78)
ANION GAP SERPL CALC-SCNC: 5 MMOL/L (ref 5–15)
AST SERPL-CCNC: 21 U/L (ref 15–37)
BASOPHILS # BLD: 0.1 K/UL (ref 0–0.1)
BASOPHILS NFR BLD: 1 % (ref 0–1)
BILIRUB SERPL-MCNC: 0.6 MG/DL (ref 0.2–1)
BLASTS NFR BLD MANUAL: 0 %
BUN SERPL-MCNC: 5 MG/DL (ref 6–20)
BUN/CREAT SERPL: 7 (ref 12–20)
CALCIUM SERPL-MCNC: 8.1 MG/DL (ref 8.5–10.1)
CHLORIDE SERPL-SCNC: 105 MMOL/L (ref 97–108)
CO2 SERPL-SCNC: 30 MMOL/L (ref 21–32)
CREAT SERPL-MCNC: 0.74 MG/DL (ref 0.55–1.02)
DIFFERENTIAL METHOD BLD: ABNORMAL
EOSINOPHIL # BLD: 0.4 K/UL (ref 0–0.4)
EOSINOPHIL NFR BLD: 4 % (ref 0–7)
ERYTHROCYTE [DISTWIDTH] IN BLOOD BY AUTOMATED COUNT: 18.7 % (ref 11.5–14.5)
GLOBULIN SER CALC-MCNC: 4.1 G/DL (ref 2–4)
GLUCOSE SERPL-MCNC: 119 MG/DL (ref 65–100)
HCT VFR BLD AUTO: 19 % (ref 35–47)
HGB BLD-MCNC: 6.4 G/DL (ref 11.5–16)
IMM GRANULOCYTES # BLD AUTO: 0 K/UL
IMM GRANULOCYTES NFR BLD AUTO: 0 %
LYMPHOCYTES # BLD: 4.6 K/UL (ref 0.8–3.5)
LYMPHOCYTES NFR BLD: 47 % (ref 12–49)
MCH RBC QN AUTO: 30.3 PG (ref 26–34)
MCHC RBC AUTO-ENTMCNC: 33.7 G/DL (ref 30–36.5)
MCV RBC AUTO: 90 FL (ref 80–99)
METAMYELOCYTES NFR BLD MANUAL: 0 %
MONOCYTES # BLD: 1 K/UL (ref 0–1)
MONOCYTES NFR BLD: 10 % (ref 5–13)
MYELOCYTES NFR BLD MANUAL: 0 %
NEUTS BAND NFR BLD MANUAL: 0 % (ref 0–6)
NEUTS SEG # BLD: 3.7 K/UL (ref 1.8–8)
NEUTS SEG NFR BLD: 38 % (ref 32–75)
NRBC # BLD: 0.06 K/UL (ref 0–0.01)
NRBC BLD-RTO: 0.6 PER 100 WBC
OTHER CELLS NFR BLD MANUAL: 0 %
PLATELET # BLD AUTO: 171 K/UL (ref 150–400)
PMV BLD AUTO: 11.4 FL (ref 8.9–12.9)
POTASSIUM SERPL-SCNC: 3.7 MMOL/L (ref 3.5–5.1)
PROMYELOCYTES NFR BLD MANUAL: 0 %
PROT SERPL-MCNC: 6.8 G/DL (ref 6.4–8.2)
RBC # BLD AUTO: 2.11 M/UL (ref 3.8–5.2)
RBC MORPH BLD: ABNORMAL
SODIUM SERPL-SCNC: 140 MMOL/L (ref 136–145)
WBC # BLD AUTO: 9.8 K/UL (ref 3.6–11)

## 2019-01-24 PROCEDURE — 74011250636 HC RX REV CODE- 250/636: Performed by: INTERNAL MEDICINE

## 2019-01-24 PROCEDURE — 85027 COMPLETE CBC AUTOMATED: CPT

## 2019-01-24 PROCEDURE — 74011250637 HC RX REV CODE- 250/637: Performed by: INTERNAL MEDICINE

## 2019-01-24 PROCEDURE — 94760 N-INVAS EAR/PLS OXIMETRY 1: CPT

## 2019-01-24 PROCEDURE — 65270000015 HC RM PRIVATE ONCOLOGY

## 2019-01-24 PROCEDURE — 77010033678 HC OXYGEN DAILY

## 2019-01-24 PROCEDURE — 80053 COMPREHEN METABOLIC PANEL: CPT

## 2019-01-24 PROCEDURE — 36415 COLL VENOUS BLD VENIPUNCTURE: CPT

## 2019-01-24 RX ORDER — HYDROCORTISONE 1 %
CREAM (GRAM) TOPICAL
COMMUNITY
End: 2019-07-26 | Stop reason: SDUPTHER

## 2019-01-24 RX ORDER — POLYETHYLENE GLYCOL 3350 17 G/17G
17 POWDER, FOR SOLUTION ORAL DAILY
Status: DISCONTINUED | OUTPATIENT
Start: 2019-01-24 | End: 2019-02-01 | Stop reason: HOSPADM

## 2019-01-24 RX ORDER — DIPHENHYDRAMINE HCL 25 MG
50 TABLET ORAL
COMMUNITY
End: 2019-08-08

## 2019-01-24 RX ADMIN — PANTOPRAZOLE SODIUM 40 MG: 40 TABLET, DELAYED RELEASE ORAL at 08:36

## 2019-01-24 RX ADMIN — DEXTROSE MONOHYDRATE, SODIUM CHLORIDE, AND POTASSIUM CHLORIDE 100 ML/HR: 50; 4.5; .745 INJECTION, SOLUTION INTRAVENOUS at 07:24

## 2019-01-24 RX ADMIN — CITALOPRAM HYDROBROMIDE 10 MG: 20 TABLET ORAL at 08:36

## 2019-01-24 RX ADMIN — POLYETHYLENE GLYCOL 3350 17 G: 17 POWDER, FOR SOLUTION ORAL at 09:39

## 2019-01-24 RX ADMIN — HYDROMORPHONE HYDROCHLORIDE 2 MG: 2 INJECTION, SOLUTION INTRAMUSCULAR; INTRAVENOUS; SUBCUTANEOUS at 13:51

## 2019-01-24 RX ADMIN — HYDROMORPHONE HYDROCHLORIDE 2 MG: 2 INJECTION, SOLUTION INTRAMUSCULAR; INTRAVENOUS; SUBCUTANEOUS at 06:18

## 2019-01-24 RX ADMIN — DIPHENHYDRAMINE HYDROCHLORIDE 25 MG: 50 INJECTION, SOLUTION INTRAMUSCULAR; INTRAVENOUS at 22:31

## 2019-01-24 RX ADMIN — HYDROMORPHONE HYDROCHLORIDE 2 MG: 2 INJECTION, SOLUTION INTRAMUSCULAR; INTRAVENOUS; SUBCUTANEOUS at 17:34

## 2019-01-24 RX ADMIN — HYDROMORPHONE HYDROCHLORIDE 2 MG: 2 INJECTION, SOLUTION INTRAMUSCULAR; INTRAVENOUS; SUBCUTANEOUS at 19:28

## 2019-01-24 RX ADMIN — Medication 10 ML: at 03:29

## 2019-01-24 RX ADMIN — ONDANSETRON 4 MG: 2 INJECTION INTRAMUSCULAR; INTRAVENOUS at 10:00

## 2019-01-24 RX ADMIN — ONDANSETRON 4 MG: 2 INJECTION INTRAMUSCULAR; INTRAVENOUS at 17:37

## 2019-01-24 RX ADMIN — ONDANSETRON 4 MG: 2 INJECTION INTRAMUSCULAR; INTRAVENOUS at 03:28

## 2019-01-24 RX ADMIN — HYDROMORPHONE HYDROCHLORIDE 2 MG: 2 INJECTION, SOLUTION INTRAMUSCULAR; INTRAVENOUS; SUBCUTANEOUS at 21:48

## 2019-01-24 RX ADMIN — DIPHENHYDRAMINE HYDROCHLORIDE 25 MG: 50 INJECTION, SOLUTION INTRAMUSCULAR; INTRAVENOUS at 03:29

## 2019-01-24 RX ADMIN — HYDROMORPHONE HYDROCHLORIDE 2 MG: 2 INJECTION, SOLUTION INTRAMUSCULAR; INTRAVENOUS; SUBCUTANEOUS at 04:12

## 2019-01-24 RX ADMIN — DIPHENHYDRAMINE HYDROCHLORIDE 25 MG: 50 INJECTION, SOLUTION INTRAMUSCULAR; INTRAVENOUS at 17:38

## 2019-01-24 RX ADMIN — HYDROMORPHONE HYDROCHLORIDE 2 MG: 2 INJECTION, SOLUTION INTRAMUSCULAR; INTRAVENOUS; SUBCUTANEOUS at 00:01

## 2019-01-24 RX ADMIN — DIPHENHYDRAMINE HYDROCHLORIDE 25 MG: 50 INJECTION, SOLUTION INTRAMUSCULAR; INTRAVENOUS at 10:00

## 2019-01-24 RX ADMIN — HYDROMORPHONE HYDROCHLORIDE 2 MG: 2 INJECTION, SOLUTION INTRAMUSCULAR; INTRAVENOUS; SUBCUTANEOUS at 11:17

## 2019-01-24 RX ADMIN — Medication 10 ML: at 19:28

## 2019-01-24 RX ADMIN — Medication 10 ML: at 13:52

## 2019-01-24 RX ADMIN — Medication 10 ML: at 06:18

## 2019-01-24 RX ADMIN — DEXTROSE MONOHYDRATE, SODIUM CHLORIDE, AND POTASSIUM CHLORIDE 100 ML/HR: 50; 4.5; .745 INJECTION, SOLUTION INTRAVENOUS at 18:47

## 2019-01-24 RX ADMIN — FOLIC ACID 1 MG: 1 TABLET ORAL at 08:36

## 2019-01-24 RX ADMIN — PANTOPRAZOLE SODIUM 40 MG: 40 TABLET, DELAYED RELEASE ORAL at 17:33

## 2019-01-24 RX ADMIN — HYDROMORPHONE HYDROCHLORIDE 2 MG: 2 INJECTION, SOLUTION INTRAMUSCULAR; INTRAVENOUS; SUBCUTANEOUS at 08:36

## 2019-01-24 RX ADMIN — ENOXAPARIN SODIUM 40 MG: 40 INJECTION SUBCUTANEOUS at 09:40

## 2019-01-24 RX ADMIN — ONDANSETRON 4 MG: 2 INJECTION INTRAMUSCULAR; INTRAVENOUS at 22:31

## 2019-01-24 NOTE — PROGRESS NOTES
General Daily Progress Note Admit Date: 1/19/2019 Subjective:  
 
Patient continues to complain of pain but improving. Current Facility-Administered Medications Medication Dose Route Frequency  polyethylene glycol (MIRALAX) packet 17 g  17 g Oral DAILY  pantoprazole (PROTONIX) tablet 40 mg  40 mg Oral ACB&D  
 enoxaparin (LOVENOX) injection 40 mg  40 mg SubCUTAneous Q24H  
 dextrose 5% - 0.45% NaCl with KCl 10 mEq/L infusion  100 mL/hr IntraVENous CONTINUOUS  
 HYDROmorphone (PF) (DILAUDID) injection 1-2 mg  1-2 mg IntraVENous Q2H PRN  
 citalopram (CELEXA) tablet 10 mg  10 mg Oral DAILY  folic acid (FOLVITE) tablet 1 mg  1 mg Oral DAILY  nitroglycerin (NITROBID) 2 % ointment 2 Inch  2 Inch Topical Q6H PRN  
 diphenhydrAMINE (BENADRYL) injection 25 mg  25 mg IntraVENous Q4H PRN  
 sodium chloride (NS) flush 5-40 mL  5-40 mL IntraVENous Q8H  
 sodium chloride (NS) flush 5-40 mL  5-40 mL IntraVENous PRN  
 naloxone (NARCAN) injection 0.4 mg  0.4 mg IntraVENous PRN  
 ondansetron (ZOFRAN) injection 4 mg  4 mg IntraVENous Q4H PRN  
 bisacodyl (DULCOLAX) suppository 10 mg  10 mg Rectal DAILY PRN Review of Systems A comprehensive review of systems was negative. Objective:  
 
Patient Vitals for the past 24 hrs: 
 BP Temp Pulse Resp SpO2  
01/24/19 0745 108/55 98.5 °F (36.9 °C) 88 16 100 % 01/23/19 2340 103/54 99 °F (37.2 °C) 99 16 94 % 01/23/19 1947 106/65 98.7 °F (37.1 °C) 93 18 97 % 01/23/19 1520 115/57 98.8 °F (37.1 °C) 93 18 96 % No intake/output data recorded. 01/22 1901 - 01/24 0700 In: 1174 [I.V.:3570] Out: - Physical Exam:  
Visit Vitals /55 (BP 1 Location: Right arm, BP Patient Position: At rest) Pulse 88 Temp 98.5 °F (36.9 °C) Resp 16 Ht 5' 10\" (1.778 m) Wt 175 lb 14.8 oz (79.8 kg) SpO2 100% BMI 25.24 kg/m² General appearance: alert, cooperative, no distress, appears stated age Neck: supple, symmetrical, trachea midline, no adenopathy, thyroid: not enlarged, symmetric, no tenderness/mass/nodules, no carotid bruit and no JVD Lungs: clear to auscultation bilaterally Heart: regular rate and rhythm, S1, S2 normal, no murmur, click, rub or gallop Abdomen: soft, non-tender. Bowel sounds normal. No masses,  no organomegaly Extremities: extremities normal, atraumatic, no cyanosis or edema Data Review Recent Results (from the past 24 hour(s)) CBC WITH MANUAL DIFF Collection Time: 01/24/19  3:34 AM  
Result Value Ref Range WBC 9.8 3.6 - 11.0 K/uL  
 RBC 2.11 (L) 3.80 - 5.20 M/uL HGB 6.4 (L) 11.5 - 16.0 g/dL HCT 19.0 (L) 35.0 - 47.0 % MCV 90.0 80.0 - 99.0 FL  
 MCH 30.3 26.0 - 34.0 PG  
 MCHC 33.7 30.0 - 36.5 g/dL  
 RDW 18.7 (H) 11.5 - 14.5 % PLATELET 981 202 - 732 K/uL MPV 11.4 8.9 - 12.9 FL  
 NRBC 0.6 (H) 0  WBC ABSOLUTE NRBC 0.06 (H) 0.00 - 0.01 K/uL NEUTROPHILS 38 32 - 75 % BAND NEUTROPHILS 0 0 - 6 % LYMPHOCYTES 47 12 - 49 % MONOCYTES 10 5 - 13 % EOSINOPHILS 4 0 - 7 % BASOPHILS 1 0 - 1 % METAMYELOCYTES 0 0 % MYELOCYTES 0 0 % PROMYELOCYTES 0 0 % BLASTS 0 0 % OTHER CELL 0 0 IMMATURE GRANULOCYTES 0 %  
 ABS. NEUTROPHILS 3.7 1.8 - 8.0 K/UL  
 ABS. LYMPHOCYTES 4.6 (H) 0.8 - 3.5 K/UL  
 ABS. MONOCYTES 1.0 0.0 - 1.0 K/UL  
 ABS. EOSINOPHILS 0.4 0.0 - 0.4 K/UL  
 ABS. BASOPHILS 0.1 0.0 - 0.1 K/UL  
 ABS. IMM. GRANS. 0.0 K/UL  
 DF MANUAL    
 RBC COMMENTS ANISOCYTOSIS 1+ 
    
 RBC COMMENTS SICKLE CELLS 
PRESENT 
    
 RBC COMMENTS TEARDROP CELLS 1+ RBC COMMENTS TARGET CELLS 2+ 
    
 RBC COMMENTS HELMET CELLS 
PRESENT 
    
METABOLIC PANEL, COMPREHENSIVE Collection Time: 01/24/19  3:34 AM  
Result Value Ref Range Sodium 140 136 - 145 mmol/L Potassium 3.7 3.5 - 5.1 mmol/L Chloride 105 97 - 108 mmol/L  
 CO2 30 21 - 32 mmol/L Anion gap 5 5 - 15 mmol/L Glucose 119 (H) 65 - 100 mg/dL BUN 5 (L) 6 - 20 MG/DL Creatinine 0.74 0.55 - 1.02 MG/DL  
 BUN/Creatinine ratio 7 (L) 12 - 20 GFR est AA >60 >60 ml/min/1.73m2 GFR est non-AA >60 >60 ml/min/1.73m2 Calcium 8.1 (L) 8.5 - 10.1 MG/DL Bilirubin, total 0.6 0.2 - 1.0 MG/DL  
 ALT (SGPT) 16 12 - 78 U/L  
 AST (SGOT) 21 15 - 37 U/L Alk. phosphatase 108 45 - 117 U/L Protein, total 6.8 6.4 - 8.2 g/dL Albumin 2.7 (L) 3.5 - 5.0 g/dL Globulin 4.1 (H) 2.0 - 4.0 g/dL A-G Ratio 0.7 (L) 1.1 - 2.2 Assessment:  
 
Principal Problem: 
  Sickle cell pain crisis ([facility] (9/8/2018) Active Problems: 
  Sickle cell anemia ([facility] (10/3/2014) Hypertension (10/3/2014) Depression (10/5/2014) Plan: 1. Continue treatment of painful crises. 2.  Will mobilize.

## 2019-01-24 NOTE — PROGRESS NOTES
Oncology End of Shift Note Bedside shift change report given to Shyanne Burkett RN (incoming nurse) by Dimas Guillory RN (outgoing nurse) on [facility]. Report included the following information SBAR, Kardex, MAR and Accordion. Shift Summary: Pt still asking for pain meds and falling asleep before you make it back to her room Issues for Physician to Address: Bowel regimen, Wants miralax Patient on Cardiac Monitoring? [] Yes 
[x] No 
 
Rhythm:   
 
 
 
Shift Events Dimas Guillory RN

## 2019-01-24 NOTE — PROGRESS NOTES
Initial Nutrition Assessment: 
 
INTERVENTIONS/RECOMMENDATIONS:  
· Continue current diet · Consider a more aggressive bowel regimen, last documented BM 1/15 ASSESSMENT:  
Chart reviewed, medically noted for sickle cell crisis, and PMH shown below. Assessing pt due to LOS. Pt reports good appetite. He weight has fluctuated quite a bit over the past several months. I followed pt during her admission in December and she was eating well at that time too. Past Medical History:  
Diagnosis Date  Anemia SC hemoglobinopathy  Chronic pain  Depression  Hypertension  Liver disease   
 hepatitis C; pt reports \"cured\"  Sickle cell disease ([address] Diet Order: Regular 
% Eaten:  No data found. Pertinent Medications: [x]Reviewed: D5 1/2LA, folic acid, zofran, PPI, miralax, Pertinent Labs: [x]Reviewed:  
Food Allergies: [x]NKFA  []Other Last BM: 1/15 Edema:        []RUE   []LUE   []RLE   []LLE Pressure Injury:      [] Stage I   [] Stage II   [] Stage III   [] Stage IV Wt Readings from Last 30 Encounters:  
01/19/19 79.8 kg (175 lb 14.8 oz) 01/10/19 82.9 kg (182 lb 12.8 oz)  
12/12/18 77 kg (169 lb 12.1 oz)  
11/13/18 89.4 kg (197 lb 1.6 oz) 10/29/18 77.4 kg (170 lb 10.2 oz)  
10/02/18 91.5 kg (201 lb 12.8 oz) 09/24/18 86.3 kg (190 lb 3.2 oz) 09/07/18 83.9 kg (185 lb)  
07/22/18 85.7 kg (188 lb 15 oz) 07/09/18 86.6 kg (191 lb)  
06/12/18 88.7 kg (195 lb 8 oz) 04/09/18 86 kg (189 lb 8 oz) 03/13/18 84.9 kg (187 lb 3.2 oz) 01/29/18 87.2 kg (192 lb 4.8 oz) 01/05/18 86.4 kg (190 lb 7.6 oz) 01/04/18 86 kg (189 lb 9.5 oz)  
11/28/17 87.7 kg (193 lb 4.8 oz)  
11/14/17 89.9 kg (198 lb 4.8 oz) 10/31/17 87.7 kg (193 lb 6.4 oz) 07/31/17 88 kg (194 lb)  
06/13/17 87.6 kg (193 lb 1.6 oz) 05/22/17 86.1 kg (189 lb 13.1 oz) 04/27/17 90.6 kg (199 lb 11.2 oz) 04/06/17 91.9 kg (202 lb 9.6 oz) 03/10/17 89.9 kg (198 lb 3.2 oz) 12/09/16 92.1 kg (203 lb 1.6 oz) 11/15/16 94.6 kg (208 lb 9.6 oz) 10/21/16 89.4 kg (197 lb)  
07/21/16 92.1 kg (203 lb) 05/17/16 91.2 kg (201 lb) Anthropometrics:  
Height: 5' 10\" (177.8 cm) Weight: 79.8 kg (175 lb 14.8 oz) IBW (%IBW):   ( ) UBW (%UBW):   (  %) Last Weight Metrics: 
Weight Loss Metrics 1/19/2019 1/10/2019 12/12/2018 11/13/2018 10/29/2018 10/2/2018 9/24/2018 Today's Wt 175 lb 14.8 oz 182 lb 12.8 oz 169 lb 12.1 oz 197 lb 1.6 oz 170 lb 10.2 oz 201 lb 12.8 oz 190 lb 3.2 oz  
BMI 25.24 kg/m2 25.5 kg/m2 23.68 kg/m2 27.49 kg/m2 23.8 kg/m2 28.15 kg/m2 26.53 kg/m2 BMI: Body mass index is 25.24 kg/m². This BMI is indicative of: 
 []Underweight    []Normal    [x]Overweight    [] Obesity   [] Extreme Obesity (BMI>40) Estimated Nutrition Needs (Based on):  
1900 Kcals/day(BMR: 1450 x 1.3) , 80 g(1 g/kg) Protein Carbohydrate: At Least 130 g/day  Fluids: 1900 mL/day (1ml/kcal) or per primary team 
 
NUTRITION DIAGNOSES:  
Problem:  No nutritional diagnosis at this time Etiology: related to Signs/Symptoms: as evidenced by NUTRITION INTERVENTIONS: 
Meals/Snacks: General/healthful diet GOAL:  
consume >50% of meals in 5-7 days LEARNING NEEDS (Diet, Food/Nutrient-Drug Interaction):  
 [x] None Identified 
 [] Identified and Education Provided/Documented 
 [] Identified and Pt declined/was not appropriate Cultureal, Hinduism, OR Ethnic Dietary Needs:  
 [x] None Identified 
 [] Identified and Addressed 
 
 [x] Interdisciplinary Care Plan Reviewed/Documented  
 [x] Discharge Planning:   General healthy diet MONITORING /EVALUATION:  
  
Food/Nutrient Intake Outcomes: Total energy intake Physical Signs/Symptoms Outcomes: Weight/weight change, Electrolyte and renal profile, GI 
 
NUTRITION RISK:  
 [] High              [] Moderate           [x]  Low  []  Minimal/Uncompromised PT SEEN FOR:  
 []  MD Consult: []Calorie Count []Diabetic Diet Education []Diet Education []Electrolyte Management []General Nutrition Management and Supplements []Management of Tube Feeding []TPN Recommendations []  RN Referral:  []MST score >=2 
   []Enteral/Parenteral Nutrition PTA []Pregnant: Gestational DM or Multigestation 
   []Pressure Ulcer/Wound Care needs 
     
[]  Low BMI [x]  LOS Referral  
 
 
Kinsey Coyle RDN Pager [phone] Weekend Pager [phone]

## 2019-01-24 NOTE — PROGRESS NOTES
Bedside shift change report given to Casimir Simmonds rn (oncoming nurse) by Dominique Bocanegra (offgoing nurse). Report included the following information SBAR, Kardex, Procedure Summary, Intake/Output, MAR and Recent Results.

## 2019-01-24 NOTE — PROGRESS NOTES
Oncology End of Shift Note Bedside shift change report given to [place], RN (incoming nurse) by Lucia Hernandez RN (outgoing nurse) on Prudence Flock. Report included the following information SBAR, Kardex, Intake/Output, MAR and Recent Results. Shift Summary: Pt is on continuous pulse ox (%). Pt requesting pain, benadryl, and zofran together). Issues for Physician to Address:  Pt on continuous pulse ox and does pt need to continue [id number]/[id number]? Patient on Cardiac Monitoring?  no 
[] Yes 
[x] No 
 
Rhythm:   
 
 
 
Shift Events Uneventful evening Lucia Hernandez RN

## 2019-01-24 NOTE — PROGRESS NOTES
Reason for Readmission:     Pt was admitted on 1/19/19 d/t a Dx of Sickle Cell Crisis. RRAT Score and Risk Level:    19 MOD Level of Readmission:    2 but d/t Sickel Cell Crisis 9/8/18 to 9/20/18 
10/28/18 to 11/7/18 12/12/18 to 1/3/19 Readmit 1/19/19 
4th admission in last 6 months. Care Conference scheduled:   Not at this time. Dr. Debbie Robb is her PCP and Hospitalist.  
    
Resources/supports as identified by patient/family:   Pt has Medicare and BC/BS as insurance. Pt lives with supportive DTR, Bebeto Hancock Top Challenges facing patient (as identified by patient/family and CM): Finances/Medication cost?    No concerns at this time. Has prescription drug coverage Transportation      Pt drives but has a DTR that can assist her as needed with transportation. Support system or lack thereof? Pt Verner Broker and Brother, Jaimie Mclean are main Support System. Mother is available if needed but she is in late 76s. Living arrangements? Pt lives in one story home with 3 JIE with DTR, Gerhard Malhotra. DTR will be getting  in 2/18 and will be moving out. Self-care/ADLs/Cognition? Alert and Oriented. I W ADLs. Drives. Current Advanced Directive/Advance Care Plan:  Pt is Full Code. NOK would be DTR. Pt not interested in working on AMD in hospital.  Pt stated that she would think about it but she was comfortable with [address] [place] [address]  making medical decisions on her behalf. Plan for utilizing home health:   Pt is open to the idea of [facility] if ordered at discharge. Likelihood of additional readmission:   HIGH d/t Sickel Cell Crisis Pt. Transition of Care Plan:    Based on readmission, the patient's previous Plan of Care has been evaluated and/or modified. The current Transition of Care Plan is:       CM asked Pt if Dr. Debbie Robb had given her any anticipated DC date. Pt indicated not at this time.   Note today from  Leigh Romero indicated to continue IV treatment of painful crisis and will mobilize. Pt indicated that depending on date of discharge that family should be able to transport home in car. CM will continue to monitor discharge plan. Care Management Interventions PCP Verified by CM: Yes 
Palliative Care Criteria Met (RRAT>21 & CHF Dx)?: No 
Mode of Transport at Discharge: Other (see comment) Transition of Care Consult (CM Consult): Discharge Planning MyChart Signup: No 
Discharge Durable Medical Equipment: No 
Physical Therapy Consult: Yes Occupational Therapy Consult: No 
Speech Therapy Consult: No 
Current Support Network: Lives with Caregiver, Own Home(Lives with DTR in one story home with 3 JIE) Confirm Follow Up Transport: Self Plan discussed with Pt/Family/Caregiver: Yes Freedom of Choice Offered: Yes Discharge Location Discharge Placement: Home with family assistance KELVIN Agee Ext [phone]

## 2019-01-25 PROCEDURE — 74011250636 HC RX REV CODE- 250/636: Performed by: INTERNAL MEDICINE

## 2019-01-25 PROCEDURE — 65270000015 HC RM PRIVATE ONCOLOGY

## 2019-01-25 PROCEDURE — 74011250637 HC RX REV CODE- 250/637: Performed by: INTERNAL MEDICINE

## 2019-01-25 PROCEDURE — 94760 N-INVAS EAR/PLS OXIMETRY 1: CPT

## 2019-01-25 PROCEDURE — 77010033678 HC OXYGEN DAILY

## 2019-01-25 RX ADMIN — DEXTROSE MONOHYDRATE, SODIUM CHLORIDE, AND POTASSIUM CHLORIDE 100 ML/HR: 50; 4.5; .745 INJECTION, SOLUTION INTRAVENOUS at 20:05

## 2019-01-25 RX ADMIN — ONDANSETRON 4 MG: 2 INJECTION INTRAMUSCULAR; INTRAVENOUS at 19:17

## 2019-01-25 RX ADMIN — ONDANSETRON 4 MG: 2 INJECTION INTRAMUSCULAR; INTRAVENOUS at 09:32

## 2019-01-25 RX ADMIN — HYDROMORPHONE HYDROCHLORIDE 2 MG: 2 INJECTION, SOLUTION INTRAMUSCULAR; INTRAVENOUS; SUBCUTANEOUS at 17:02

## 2019-01-25 RX ADMIN — FOLIC ACID 1 MG: 1 TABLET ORAL at 08:57

## 2019-01-25 RX ADMIN — HYDROMORPHONE HYDROCHLORIDE 2 MG: 2 INJECTION, SOLUTION INTRAMUSCULAR; INTRAVENOUS; SUBCUTANEOUS at 12:04

## 2019-01-25 RX ADMIN — Medication 10 ML: at 14:29

## 2019-01-25 RX ADMIN — HYDROMORPHONE HYDROCHLORIDE 2 MG: 2 INJECTION, SOLUTION INTRAMUSCULAR; INTRAVENOUS; SUBCUTANEOUS at 14:29

## 2019-01-25 RX ADMIN — Medication 10 ML: at 04:34

## 2019-01-25 RX ADMIN — HYDROMORPHONE HYDROCHLORIDE 2 MG: 2 INJECTION, SOLUTION INTRAMUSCULAR; INTRAVENOUS; SUBCUTANEOUS at 20:05

## 2019-01-25 RX ADMIN — HYDROMORPHONE HYDROCHLORIDE 2 MG: 2 INJECTION, SOLUTION INTRAMUSCULAR; INTRAVENOUS; SUBCUTANEOUS at 22:59

## 2019-01-25 RX ADMIN — DIPHENHYDRAMINE HYDROCHLORIDE 25 MG: 50 INJECTION, SOLUTION INTRAMUSCULAR; INTRAVENOUS at 05:10

## 2019-01-25 RX ADMIN — PANTOPRAZOLE SODIUM 40 MG: 40 TABLET, DELAYED RELEASE ORAL at 08:56

## 2019-01-25 RX ADMIN — HYDROMORPHONE HYDROCHLORIDE 2 MG: 2 INJECTION, SOLUTION INTRAMUSCULAR; INTRAVENOUS; SUBCUTANEOUS at 09:58

## 2019-01-25 RX ADMIN — DEXTROSE MONOHYDRATE, SODIUM CHLORIDE, AND POTASSIUM CHLORIDE 100 ML/HR: 50; 4.5; .745 INJECTION, SOLUTION INTRAVENOUS at 04:32

## 2019-01-25 RX ADMIN — CITALOPRAM HYDROBROMIDE 10 MG: 20 TABLET ORAL at 08:57

## 2019-01-25 RX ADMIN — DIPHENHYDRAMINE HYDROCHLORIDE 25 MG: 50 INJECTION, SOLUTION INTRAMUSCULAR; INTRAVENOUS at 13:59

## 2019-01-25 RX ADMIN — HYDROMORPHONE HYDROCHLORIDE 2 MG: 2 INJECTION, SOLUTION INTRAMUSCULAR; INTRAVENOUS; SUBCUTANEOUS at 00:09

## 2019-01-25 RX ADMIN — ENOXAPARIN SODIUM 40 MG: 40 INJECTION SUBCUTANEOUS at 09:31

## 2019-01-25 RX ADMIN — POLYETHYLENE GLYCOL 3350 17 G: 17 POWDER, FOR SOLUTION ORAL at 08:56

## 2019-01-25 RX ADMIN — ONDANSETRON 4 MG: 2 INJECTION INTRAMUSCULAR; INTRAVENOUS at 05:10

## 2019-01-25 RX ADMIN — Medication 10 ML: at 12:04

## 2019-01-25 RX ADMIN — DIPHENHYDRAMINE HYDROCHLORIDE 25 MG: 50 INJECTION, SOLUTION INTRAMUSCULAR; INTRAVENOUS at 19:17

## 2019-01-25 RX ADMIN — Medication 10 ML: at 23:00

## 2019-01-25 RX ADMIN — ONDANSETRON 4 MG: 2 INJECTION INTRAMUSCULAR; INTRAVENOUS at 13:59

## 2019-01-25 RX ADMIN — HYDROMORPHONE HYDROCHLORIDE 2 MG: 2 INJECTION, SOLUTION INTRAMUSCULAR; INTRAVENOUS; SUBCUTANEOUS at 04:32

## 2019-01-25 RX ADMIN — PANTOPRAZOLE SODIUM 40 MG: 40 TABLET, DELAYED RELEASE ORAL at 17:02

## 2019-01-25 RX ADMIN — Medication 10 ML: at 17:04

## 2019-01-25 RX ADMIN — HYDROMORPHONE HYDROCHLORIDE 2 MG: 2 INJECTION, SOLUTION INTRAMUSCULAR; INTRAVENOUS; SUBCUTANEOUS at 02:27

## 2019-01-25 RX ADMIN — HYDROMORPHONE HYDROCHLORIDE 2 MG: 2 INJECTION, SOLUTION INTRAMUSCULAR; INTRAVENOUS; SUBCUTANEOUS at 07:38

## 2019-01-25 RX ADMIN — DIPHENHYDRAMINE HYDROCHLORIDE 25 MG: 50 INJECTION, SOLUTION INTRAMUSCULAR; INTRAVENOUS at 09:31

## 2019-01-25 NOTE — PROGRESS NOTES
Patient sleeping, side rails up, and call bell w/in reach. 1925 Bedside and Verbal shift change report given to Samantha OLIVEIRA RN (oncoming nurse) by Kumar Hein RN (offgoing nurse). Report included the following information SBAR, Kardex, ED Summary and MAR.

## 2019-01-25 NOTE — PROGRESS NOTES
General Daily Progress Note Admit Date: 1/19/2019 Subjective:  
 
Patient continues to complain of pain. [place] Ring Current Facility-Administered Medications Medication Dose Route Frequency  polyethylene glycol (MIRALAX) packet 17 g  17 g Oral DAILY  pantoprazole (PROTONIX) tablet 40 mg  40 mg Oral ACB&D  
 enoxaparin (LOVENOX) injection 40 mg  40 mg SubCUTAneous Q24H  
 dextrose 5% - 0.45% NaCl with KCl 10 mEq/L infusion  100 mL/hr IntraVENous CONTINUOUS  
 HYDROmorphone (PF) (DILAUDID) injection 1-2 mg  1-2 mg IntraVENous Q2H PRN  
 citalopram (CELEXA) tablet 10 mg  10 mg Oral DAILY  folic acid (FOLVITE) tablet 1 mg  1 mg Oral DAILY  nitroglycerin (NITROBID) 2 % ointment 2 Inch  2 Inch Topical Q6H PRN  
 diphenhydrAMINE (BENADRYL) injection 25 mg  25 mg IntraVENous Q4H PRN  
 sodium chloride (NS) flush 5-40 mL  5-40 mL IntraVENous Q8H  
 sodium chloride (NS) flush 5-40 mL  5-40 mL IntraVENous PRN  
 naloxone (NARCAN) injection 0.4 mg  0.4 mg IntraVENous PRN  
 ondansetron (ZOFRAN) injection 4 mg  4 mg IntraVENous Q4H PRN  
 bisacodyl (DULCOLAX) suppository 10 mg  10 mg Rectal DAILY PRN Review of Systems A comprehensive review of systems was negative. Objective:  
 
Patient Vitals for the past 24 hrs: 
 BP Temp Pulse Resp SpO2  
01/25/19 0745 122/76 99 °F (37.2 °C) 92 18 99 % 01/24/19 2229 112/64 99.1 °F (37.3 °C) 93 18 99 % 01/24/19 1909 137/75 99.7 °F (37.6 °C) 92 16 100 % 01/24/19 1608 124/74 98.7 °F (37.1 °C) 91 16 95 % 01/25 0701 - 01/25 1900 In: 1235 [I.V.:1235] Out: -  
01/23 1901 - 01/25 0700 In: 5058.3 [P.O.:740; I.V.:4318.3] Out: - Physical Exam:  
Visit Vitals /76 (BP 1 Location: Right arm, BP Patient Position: Sitting) Pulse 92 Temp 99 °F (37.2 °C) Resp 18 Ht 5' 10\" (1.778 m) Wt 175 lb 14.8 oz (79.8 kg) SpO2 99% BMI 25.24 kg/m² General appearance: alert, cooperative, no distress, appears stated age Neck: supple, symmetrical, trachea midline, no adenopathy, thyroid: not enlarged, symmetric, no tenderness/mass/nodules, no carotid bruit and no JVD Lungs: clear to auscultation bilaterally Heart: regular rate and rhythm, S1, S2 normal, no murmur, click, rub or gallop Abdomen: soft, non-tender. Bowel sounds normal. No masses,  no organomegaly Extremities: extremities normal, atraumatic, no cyanosis or edema Data Review No results found for this or any previous visit (from the past 24 hour(s)). Assessment:  
 
Principal Problem: 
  S[facility] ([facility] (9/8/2018) Active Problems: 
  Sickle cell anemia ([facility] (10/3/2014) Hypertension (10/3/2014) Depression (10/5/2014) Plan: 1. Continue treatment of painful crises. Await results of CBC to ensure her hemoglobin is not dropping. 2.  We will mobilize.

## 2019-01-26 LAB
BASOPHILS # BLD: 0 K/UL (ref 0–0.1)
BASOPHILS NFR BLD: 0 % (ref 0–1)
BLASTS NFR BLD MANUAL: 0 %
EOSINOPHIL # BLD: 0 K/UL (ref 0–0.4)
EOSINOPHIL NFR BLD: 0 % (ref 0–7)
ERYTHROCYTE [DISTWIDTH] IN BLOOD BY AUTOMATED COUNT: 19.6 % (ref 11.5–14.5)
HBV SURFACE AG SER QL: <0.1 INDEX
HBV SURFACE AG SER QL: NEGATIVE
HCT VFR BLD AUTO: 21.1 % (ref 35–47)
HCV AB SER IA-ACNC: >11 INDEX
HCV AB SERPL QL IA: REACTIVE
HCV COMMENT,HCGAC: ABNORMAL
HGB BLD-MCNC: 6.9 G/DL (ref 11.5–16)
HIV1 P24 AG SERPL QL IA: NONREACTIVE
HIV1+2 AB SERPL QL IA: NONREACTIVE
IMM GRANULOCYTES # BLD AUTO: 0 K/UL
IMM GRANULOCYTES NFR BLD AUTO: 0 %
LYMPHOCYTES # BLD: 0 K/UL (ref 0.8–3.5)
LYMPHOCYTES NFR BLD: 0 % (ref 12–49)
MCH RBC QN AUTO: 29.7 PG (ref 26–34)
MCHC RBC AUTO-ENTMCNC: 32.7 G/DL (ref 30–36.5)
MCV RBC AUTO: 90.9 FL (ref 80–99)
METAMYELOCYTES NFR BLD MANUAL: 0 %
MONOCYTES # BLD: 0 K/UL (ref 0–1)
MONOCYTES NFR BLD: 0 % (ref 5–13)
MYELOCYTES NFR BLD MANUAL: 0 %
NEUTS BAND NFR BLD MANUAL: 0 % (ref 0–6)
NEUTS SEG # BLD: 0 K/UL (ref 1.8–8)
NEUTS SEG NFR BLD: 0 % (ref 32–75)
NRBC # BLD: 0.11 K/UL (ref 0–0.01)
NRBC BLD-RTO: 1 PER 100 WBC
OTHER CELLS NFR BLD MANUAL: 0 %
PLATELET # BLD AUTO: 174 K/UL (ref 150–400)
PMV BLD AUTO: 10.7 FL (ref 8.9–12.9)
PROMYELOCYTES NFR BLD MANUAL: 0 %
RBC # BLD AUTO: 2.32 M/UL (ref 3.8–5.2)
RBC MORPH BLD: ABNORMAL
WBC # BLD AUTO: 10.6 K/UL (ref 3.6–11)

## 2019-01-26 PROCEDURE — 74011250637 HC RX REV CODE- 250/637: Performed by: INTERNAL MEDICINE

## 2019-01-26 PROCEDURE — 94760 N-INVAS EAR/PLS OXIMETRY 1: CPT

## 2019-01-26 PROCEDURE — 85027 COMPLETE CBC AUTOMATED: CPT

## 2019-01-26 PROCEDURE — 65270000015 HC RM PRIVATE ONCOLOGY

## 2019-01-26 PROCEDURE — 74011250636 HC RX REV CODE- 250/636: Performed by: INTERNAL MEDICINE

## 2019-01-26 PROCEDURE — 77010033678 HC OXYGEN DAILY

## 2019-01-26 PROCEDURE — 36415 COLL VENOUS BLD VENIPUNCTURE: CPT

## 2019-01-26 RX ADMIN — DIPHENHYDRAMINE HYDROCHLORIDE 25 MG: 50 INJECTION, SOLUTION INTRAMUSCULAR; INTRAVENOUS at 17:29

## 2019-01-26 RX ADMIN — HYDROMORPHONE HYDROCHLORIDE 2 MG: 2 INJECTION, SOLUTION INTRAMUSCULAR; INTRAVENOUS; SUBCUTANEOUS at 01:41

## 2019-01-26 RX ADMIN — POLYETHYLENE GLYCOL 3350 17 G: 17 POWDER, FOR SOLUTION ORAL at 08:28

## 2019-01-26 RX ADMIN — HYDROMORPHONE HYDROCHLORIDE 2 MG: 2 INJECTION, SOLUTION INTRAMUSCULAR; INTRAVENOUS; SUBCUTANEOUS at 04:47

## 2019-01-26 RX ADMIN — HYDROMORPHONE HYDROCHLORIDE 2 MG: 2 INJECTION, SOLUTION INTRAMUSCULAR; INTRAVENOUS; SUBCUTANEOUS at 11:33

## 2019-01-26 RX ADMIN — ONDANSETRON 4 MG: 2 INJECTION INTRAMUSCULAR; INTRAVENOUS at 14:22

## 2019-01-26 RX ADMIN — HYDROMORPHONE HYDROCHLORIDE 2 MG: 2 INJECTION, SOLUTION INTRAMUSCULAR; INTRAVENOUS; SUBCUTANEOUS at 15:33

## 2019-01-26 RX ADMIN — DIPHENHYDRAMINE HYDROCHLORIDE 25 MG: 50 INJECTION, SOLUTION INTRAMUSCULAR; INTRAVENOUS at 14:22

## 2019-01-26 RX ADMIN — ONDANSETRON 4 MG: 2 INJECTION INTRAMUSCULAR; INTRAVENOUS at 03:04

## 2019-01-26 RX ADMIN — Medication 10 ML: at 22:54

## 2019-01-26 RX ADMIN — ONDANSETRON 4 MG: 2 INJECTION INTRAMUSCULAR; INTRAVENOUS at 09:25

## 2019-01-26 RX ADMIN — DIPHENHYDRAMINE HYDROCHLORIDE 25 MG: 50 INJECTION, SOLUTION INTRAMUSCULAR; INTRAVENOUS at 09:25

## 2019-01-26 RX ADMIN — HYDROMORPHONE HYDROCHLORIDE 2 MG: 2 INJECTION, SOLUTION INTRAMUSCULAR; INTRAVENOUS; SUBCUTANEOUS at 20:11

## 2019-01-26 RX ADMIN — CITALOPRAM HYDROBROMIDE 10 MG: 20 TABLET ORAL at 08:29

## 2019-01-26 RX ADMIN — DIPHENHYDRAMINE HYDROCHLORIDE 25 MG: 50 INJECTION, SOLUTION INTRAMUSCULAR; INTRAVENOUS at 21:24

## 2019-01-26 RX ADMIN — DIPHENHYDRAMINE HYDROCHLORIDE 25 MG: 50 INJECTION, SOLUTION INTRAMUSCULAR; INTRAVENOUS at 03:04

## 2019-01-26 RX ADMIN — PANTOPRAZOLE SODIUM 40 MG: 40 TABLET, DELAYED RELEASE ORAL at 08:29

## 2019-01-26 RX ADMIN — HYDROMORPHONE HYDROCHLORIDE 2 MG: 2 INJECTION, SOLUTION INTRAMUSCULAR; INTRAVENOUS; SUBCUTANEOUS at 08:31

## 2019-01-26 RX ADMIN — ONDANSETRON 4 MG: 2 INJECTION INTRAMUSCULAR; INTRAVENOUS at 17:29

## 2019-01-26 RX ADMIN — ENOXAPARIN SODIUM 40 MG: 40 INJECTION SUBCUTANEOUS at 09:24

## 2019-01-26 RX ADMIN — HYDROMORPHONE HYDROCHLORIDE 2 MG: 2 INJECTION, SOLUTION INTRAMUSCULAR; INTRAVENOUS; SUBCUTANEOUS at 22:54

## 2019-01-26 RX ADMIN — FOLIC ACID 1 MG: 1 TABLET ORAL at 08:29

## 2019-01-26 RX ADMIN — Medication 10 ML: at 04:47

## 2019-01-26 RX ADMIN — PANTOPRAZOLE SODIUM 40 MG: 40 TABLET, DELAYED RELEASE ORAL at 15:33

## 2019-01-26 RX ADMIN — HYDROMORPHONE HYDROCHLORIDE 2 MG: 2 INJECTION, SOLUTION INTRAMUSCULAR; INTRAVENOUS; SUBCUTANEOUS at 13:32

## 2019-01-26 RX ADMIN — ONDANSETRON 4 MG: 2 INJECTION INTRAMUSCULAR; INTRAVENOUS at 21:24

## 2019-01-26 RX ADMIN — Medication 10 ML: at 14:22

## 2019-01-26 RX ADMIN — HYDROMORPHONE HYDROCHLORIDE 2 MG: 2 INJECTION, SOLUTION INTRAMUSCULAR; INTRAVENOUS; SUBCUTANEOUS at 18:10

## 2019-01-26 RX ADMIN — HYDROMORPHONE HYDROCHLORIDE 2 MG: 2 INJECTION, SOLUTION INTRAMUSCULAR; INTRAVENOUS; SUBCUTANEOUS at 06:21

## 2019-01-26 RX ADMIN — DEXTROSE MONOHYDRATE, SODIUM CHLORIDE, AND POTASSIUM CHLORIDE 100 ML/HR: 50; 4.5; .745 INJECTION, SOLUTION INTRAVENOUS at 17:29

## 2019-01-26 RX ADMIN — DEXTROSE MONOHYDRATE, SODIUM CHLORIDE, AND POTASSIUM CHLORIDE 100 ML/HR: 50; 4.5; .745 INJECTION, SOLUTION INTRAVENOUS at 06:21

## 2019-01-26 NOTE — PROGRESS NOTES
Oncology End of Shift Note Bedside shift change report given to [facility], RN (incoming nurse) by Eugenie Hernández (outgoing nurse) on Rodell Hel. Report included the following information SBAR, Kardex and MAR. Shift Summary:  
Dilaudid Q2 Benadryl and zofran Q 8 
0 BM No ambulation Issues for Physician to Address:  Transition to PO meds for pain management? Pt's daughter is getting  this weekend Patient on Cardiac Monitoring? [] Yes 
[x] No 
 
Rhythm:   
 
 
 
Shift Events Eugenie Hernández

## 2019-01-26 NOTE — PROGRESS NOTES
Oncology End of Shift Note Bedside shift change report given to America Manrique RN (incoming nurse) by Kin Crane (outgoing nurse) on Prudence Flock. Report included the following information SBAR, Kardex and MAR. Shift Summary: Controlled pt pain as much as possible. Pt ate well throughout the day. Pt managed to sleep. Issues for Physician to Address:  None. Patient on Cardiac Monitoring? [] Yes 
[x] No 
 
Rhythm: Telemetry: normal sinus rhythm Shift Events: 
Controlled pt pain as much as possible. Kin Crane

## 2019-01-26 NOTE — PROGRESS NOTES
Problem: Falls - Risk of 
Goal: *Absence of Falls Document Kian Freedman Fall Risk and appropriate interventions in the flowsheet. Outcome: Progressing Towards Goal 
Fall Risk Interventions: 
  
 
Mentation Interventions: Adequate sleep, hydration, pain control Medication Interventions: Assess postural VS orthostatic hypotension History of Falls Interventions: Door open when patient unattended

## 2019-01-26 NOTE — PROGRESS NOTES
General Daily Progress Note Admit Date: 1/19/2019 Subjective:  
 
Patient continues to complain of pain but improving. Julia Segal Current Facility-Administered Medications Medication Dose Route Frequency  polyethylene glycol (MIRALAX) packet 17 g  17 g Oral DAILY  pantoprazole (PROTONIX) tablet 40 mg  40 mg Oral ACB&D  
 enoxaparin (LOVENOX) injection 40 mg  40 mg SubCUTAneous Q24H  
 dextrose 5% - 0.45% NaCl with KCl 10 mEq/L infusion  100 mL/hr IntraVENous CONTINUOUS  
 HYDROmorphone (PF) (DILAUDID) injection 1-2 mg  1-2 mg IntraVENous Q2H PRN  
 citalopram (CELEXA) tablet 10 mg  10 mg Oral DAILY  folic acid (FOLVITE) tablet 1 mg  1 mg Oral DAILY  nitroglycerin (NITROBID) 2 % ointment 2 Inch  2 Inch Topical Q6H PRN  
 diphenhydrAMINE (BENADRYL) injection 25 mg  25 mg IntraVENous Q4H PRN  
 sodium chloride (NS) flush 5-40 mL  5-40 mL IntraVENous Q8H  
 sodium chloride (NS) flush 5-40 mL  5-40 mL IntraVENous PRN  
 naloxone (NARCAN) injection 0.4 mg  0.4 mg IntraVENous PRN  
 ondansetron (ZOFRAN) injection 4 mg  4 mg IntraVENous Q4H PRN  
 bisacodyl (DULCOLAX) suppository 10 mg  10 mg Rectal DAILY PRN Review of Systems A comprehensive review of systems was negative. Objective:  
 
Patient Vitals for the past 24 hrs: 
 BP Temp Pulse Resp SpO2  
01/26/19 1608 118/67 98.8 °F (37.1 °C) 93 20 100 % 01/26/19 0808 114/71 99.2 °F (37.3 °C) 89 18 99 % 01/25/19 2241 101/80 99.8 °F (37.7 °C) 100 18 100 % 01/25/19 1929 140/76 99.3 °F (37.4 °C) 100 18 99 % No intake/output data recorded. 01/24 1901 - 01/26 0700 In: 5266.7 [P.O.:500; I.V.:4766.7] Out: - Physical Exam:  
Visit Vitals /67 (BP 1 Location: Right arm, BP Patient Position: At rest) Pulse 93 Temp 98.8 °F (37.1 °C) Resp 20 Ht 5' 10\" (1.778 m) Wt 175 lb 14.8 oz (79.8 kg) SpO2 100% BMI 25.24 kg/m² General appearance: alert, cooperative, no distress, appears stated age Neck: supple, symmetrical, trachea midline, no adenopathy, thyroid: not enlarged, symmetric, no tenderness/mass/nodules, no carotid bruit and no JVD Lungs: clear to auscultation bilaterally Heart: regular rate and rhythm, S1, S2 normal, no murmur, click, rub or gallop Abdomen: soft, non-tender. Bowel sounds normal. No masses,  no organomegaly Extremities: extremities normal, atraumatic, no cyanosis or edema Data Review Recent Results (from the past 24 hour(s)) CBC WITH MANUAL DIFF Collection Time: 01/26/19  1:46 AM  
Result Value Ref Range WBC 10.6 3.6 - 11.0 K/uL  
 RBC 2.32 (L) 3.80 - 5.20 M/uL HGB 6.9 (L) 11.5 - 16.0 g/dL HCT 21.1 (L) 35.0 - 47.0 % MCV 90.9 80.0 - 99.0 FL  
 MCH 29.7 26.0 - 34.0 PG  
 MCHC 32.7 30.0 - 36.5 g/dL  
 RDW 19.6 (H) 11.5 - 14.5 % PLATELET 762 137 - 548 K/uL MPV 10.7 8.9 - 12.9 FL  
 NRBC 1.0 (H) 0  WBC ABSOLUTE NRBC 0.11 (H) 0.00 - 0.01 K/uL NEUTROPHILS 0 (L) 32 - 75 % BAND NEUTROPHILS 0 0 - 6 % LYMPHOCYTES 0 (L) 12 - 49 % MONOCYTES 0 (L) 5 - 13 % EOSINOPHILS 0 0 - 7 % BASOPHILS 0 0 - 1 % METAMYELOCYTES 0 0 % MYELOCYTES 0 0 % PROMYELOCYTES 0 0 % BLASTS 0 0 % OTHER CELL 0 0 IMMATURE GRANULOCYTES 0 %  
 ABS. NEUTROPHILS 0.0 (L) 1.8 - 8.0 K/UL  
 ABS. LYMPHOCYTES 0.0 (L) 0.8 - 3.5 K/UL  
 ABS. MONOCYTES 0.0 0.0 - 1.0 K/UL  
 ABS. EOSINOPHILS 0.0 0.0 - 0.4 K/UL  
 ABS. BASOPHILS 0.0 0.0 - 0.1 K/UL  
 ABS. IMM. GRANS. 0.0 K/UL  
 RBC COMMENTS ANISOCYTOSIS 2+ 
    
 RBC COMMENTS HYPOCHROMIA 1+ 
    
 RBC COMMENTS SICKLE CELLS 
PRESENT 
    
 RBC COMMENTS TARGET CELLS 
PRESENT 
    
POST EXPOSURE PROFILE Collection Time: 01/26/19 12:37 PM  
Result Value Ref Range p24 Antigen NONREACTIVE NR    
 HIV-1,2 Ab NONREACTIVE NR Hepatitis C virus Ab >11.00 Index Hep C  virus Ab Interp. REACTIVE (A) NR Hep C  virus Ab comment Method used is [facility] Hepatitis B surface Ag <0.10 Index Hep B surface Ag Interp. NEGATIVE  NEG Assessment:  
 
Principal Problem: 
  Sickle cell pain crisis ([facility] (9/8/2018) Active Problems: 
  Sickle cell anemia ([facility] (10/3/2014) Hypertension (10/3/2014) Depression (10/5/2014) Plan: 1. Continue treatment of painful crises. Hemoglobin reasonably stable. 2.  No complications to date.

## 2019-01-27 LAB
ANION GAP SERPL CALC-SCNC: 4 MMOL/L (ref 5–15)
BUN SERPL-MCNC: 5 MG/DL (ref 6–20)
BUN/CREAT SERPL: 7 (ref 12–20)
CALCIUM SERPL-MCNC: 8.6 MG/DL (ref 8.5–10.1)
CHLORIDE SERPL-SCNC: 99 MMOL/L (ref 97–108)
CO2 SERPL-SCNC: 33 MMOL/L (ref 21–32)
CREAT SERPL-MCNC: 0.75 MG/DL (ref 0.55–1.02)
GLUCOSE SERPL-MCNC: 93 MG/DL (ref 65–100)
POTASSIUM SERPL-SCNC: 3.9 MMOL/L (ref 3.5–5.1)
SODIUM SERPL-SCNC: 136 MMOL/L (ref 136–145)

## 2019-01-27 PROCEDURE — 74011250636 HC RX REV CODE- 250/636: Performed by: INTERNAL MEDICINE

## 2019-01-27 PROCEDURE — 80048 BASIC METABOLIC PNL TOTAL CA: CPT

## 2019-01-27 PROCEDURE — 74011250637 HC RX REV CODE- 250/637: Performed by: INTERNAL MEDICINE

## 2019-01-27 PROCEDURE — 77010033678 HC OXYGEN DAILY

## 2019-01-27 PROCEDURE — 94760 N-INVAS EAR/PLS OXIMETRY 1: CPT

## 2019-01-27 PROCEDURE — 36415 COLL VENOUS BLD VENIPUNCTURE: CPT

## 2019-01-27 PROCEDURE — 65270000015 HC RM PRIVATE ONCOLOGY

## 2019-01-27 RX ADMIN — HYDROMORPHONE HYDROCHLORIDE 2 MG: 2 INJECTION, SOLUTION INTRAMUSCULAR; INTRAVENOUS; SUBCUTANEOUS at 14:06

## 2019-01-27 RX ADMIN — DIPHENHYDRAMINE HYDROCHLORIDE 25 MG: 50 INJECTION, SOLUTION INTRAMUSCULAR; INTRAVENOUS at 04:04

## 2019-01-27 RX ADMIN — FOLIC ACID 1 MG: 1 TABLET ORAL at 08:17

## 2019-01-27 RX ADMIN — HYDROMORPHONE HYDROCHLORIDE 2 MG: 2 INJECTION, SOLUTION INTRAMUSCULAR; INTRAVENOUS; SUBCUTANEOUS at 16:16

## 2019-01-27 RX ADMIN — DIPHENHYDRAMINE HYDROCHLORIDE 25 MG: 50 INJECTION, SOLUTION INTRAMUSCULAR; INTRAVENOUS at 22:46

## 2019-01-27 RX ADMIN — PANTOPRAZOLE SODIUM 40 MG: 40 TABLET, DELAYED RELEASE ORAL at 08:04

## 2019-01-27 RX ADMIN — ONDANSETRON 4 MG: 2 INJECTION INTRAMUSCULAR; INTRAVENOUS at 22:46

## 2019-01-27 RX ADMIN — HYDROMORPHONE HYDROCHLORIDE 2 MG: 2 INJECTION, SOLUTION INTRAMUSCULAR; INTRAVENOUS; SUBCUTANEOUS at 08:00

## 2019-01-27 RX ADMIN — HYDROMORPHONE HYDROCHLORIDE 2 MG: 2 INJECTION, SOLUTION INTRAMUSCULAR; INTRAVENOUS; SUBCUTANEOUS at 22:47

## 2019-01-27 RX ADMIN — HYDROMORPHONE HYDROCHLORIDE 2 MG: 2 INJECTION, SOLUTION INTRAMUSCULAR; INTRAVENOUS; SUBCUTANEOUS at 20:45

## 2019-01-27 RX ADMIN — DIPHENHYDRAMINE HYDROCHLORIDE 25 MG: 50 INJECTION, SOLUTION INTRAMUSCULAR; INTRAVENOUS at 18:47

## 2019-01-27 RX ADMIN — ONDANSETRON 4 MG: 2 INJECTION INTRAMUSCULAR; INTRAVENOUS at 08:01

## 2019-01-27 RX ADMIN — DEXTROSE MONOHYDRATE, SODIUM CHLORIDE, AND POTASSIUM CHLORIDE 100 ML/HR: 50; 4.5; .745 INJECTION, SOLUTION INTRAVENOUS at 23:42

## 2019-01-27 RX ADMIN — DIPHENHYDRAMINE HYDROCHLORIDE 25 MG: 50 INJECTION, SOLUTION INTRAMUSCULAR; INTRAVENOUS at 08:01

## 2019-01-27 RX ADMIN — ENOXAPARIN SODIUM 40 MG: 40 INJECTION SUBCUTANEOUS at 10:21

## 2019-01-27 RX ADMIN — HYDROMORPHONE HYDROCHLORIDE 2 MG: 2 INJECTION, SOLUTION INTRAMUSCULAR; INTRAVENOUS; SUBCUTANEOUS at 10:21

## 2019-01-27 RX ADMIN — ONDANSETRON 4 MG: 2 INJECTION INTRAMUSCULAR; INTRAVENOUS at 18:47

## 2019-01-27 RX ADMIN — ONDANSETRON 4 MG: 2 INJECTION INTRAMUSCULAR; INTRAVENOUS at 14:06

## 2019-01-27 RX ADMIN — HYDROMORPHONE HYDROCHLORIDE 2 MG: 2 INJECTION, SOLUTION INTRAMUSCULAR; INTRAVENOUS; SUBCUTANEOUS at 18:47

## 2019-01-27 RX ADMIN — PANTOPRAZOLE SODIUM 40 MG: 40 TABLET, DELAYED RELEASE ORAL at 16:16

## 2019-01-27 RX ADMIN — CITALOPRAM HYDROBROMIDE 10 MG: 20 TABLET ORAL at 08:17

## 2019-01-27 RX ADMIN — Medication 10 ML: at 05:18

## 2019-01-27 RX ADMIN — Medication 10 ML: at 14:07

## 2019-01-27 RX ADMIN — HYDROMORPHONE HYDROCHLORIDE 2 MG: 2 INJECTION, SOLUTION INTRAMUSCULAR; INTRAVENOUS; SUBCUTANEOUS at 03:08

## 2019-01-27 RX ADMIN — POLYETHYLENE GLYCOL 3350 17 G: 17 POWDER, FOR SOLUTION ORAL at 08:17

## 2019-01-27 RX ADMIN — DEXTROSE MONOHYDRATE, SODIUM CHLORIDE, AND POTASSIUM CHLORIDE 100 ML/HR: 50; 4.5; .745 INJECTION, SOLUTION INTRAVENOUS at 04:04

## 2019-01-27 RX ADMIN — ONDANSETRON 4 MG: 2 INJECTION INTRAMUSCULAR; INTRAVENOUS at 04:04

## 2019-01-27 RX ADMIN — HYDROMORPHONE HYDROCHLORIDE 2 MG: 2 INJECTION, SOLUTION INTRAMUSCULAR; INTRAVENOUS; SUBCUTANEOUS at 05:18

## 2019-01-27 RX ADMIN — Medication 10 ML: at 20:47

## 2019-01-27 RX ADMIN — DIPHENHYDRAMINE HYDROCHLORIDE 25 MG: 50 INJECTION, SOLUTION INTRAMUSCULAR; INTRAVENOUS at 14:06

## 2019-01-27 RX ADMIN — DEXTROSE MONOHYDRATE, SODIUM CHLORIDE, AND POTASSIUM CHLORIDE 100 ML/HR: 50; 4.5; .745 INJECTION, SOLUTION INTRAVENOUS at 14:17

## 2019-01-27 NOTE — PROGRESS NOTES
General Daily Progress Note Admit Date: 1/19/2019 Subjective:  
 
Patient continues to complain of pain,. Current Facility-Administered Medications Medication Dose Route Frequency  polyethylene glycol (MIRALAX) packet 17 g  17 g Oral DAILY  pantoprazole (PROTONIX) tablet 40 mg  40 mg Oral ACB&D  
 enoxaparin (LOVENOX) injection 40 mg  40 mg SubCUTAneous Q24H  
 dextrose 5% - 0.45% NaCl with KCl 10 mEq/L infusion  100 mL/hr IntraVENous CONTINUOUS  
 HYDROmorphone (PF) (DILAUDID) injection 1-2 mg  1-2 mg IntraVENous Q2H PRN  
 citalopram (CELEXA) tablet 10 mg  10 mg Oral DAILY  folic acid (FOLVITE) tablet 1 mg  1 mg Oral DAILY  nitroglycerin (NITROBID) 2 % ointment 2 Inch  2 Inch Topical Q6H PRN  
 diphenhydrAMINE (BENADRYL) injection 25 mg  25 mg IntraVENous Q4H PRN  
 sodium chloride (NS) flush 5-40 mL  5-40 mL IntraVENous Q8H  
 sodium chloride (NS) flush 5-40 mL  5-40 mL IntraVENous PRN  
 naloxone (NARCAN) injection 0.4 mg  0.4 mg IntraVENous PRN  
 ondansetron (ZOFRAN) injection 4 mg  4 mg IntraVENous Q4H PRN  
 bisacodyl (DULCOLAX) suppository 10 mg  10 mg Rectal DAILY PRN Review of Systems A comprehensive review of systems was negative. Objective:  
 
Patient Vitals for the past 24 hrs: 
 BP Temp Pulse Resp SpO2  
01/27/19 0743 121/61 98.7 °F (37.1 °C) 87 18 97 % 01/26/19 2252 122/74 99.1 °F (37.3 °C) 94 20 99 % 01/26/19 1944 110/62 98.6 °F (37 °C) 91 20 99 % 01/26/19 1608 118/67 98.8 °F (37.1 °C) 93 20 100 % 01/27 0701 - 01/27 1900 In: 1200 [I.V.:1200] Out: -  
01/25 1901 - 01/27 0700 In: 2000 [P.O.:800; I.V.:1200] Out: - Physical Exam:  
Visit Vitals /61 (BP 1 Location: Right arm, BP Patient Position: At rest) Pulse 87 Temp 98.7 °F (37.1 °C) Resp 18 Ht 5' 10\" (1.778 m) Wt 175 lb 14.8 oz (79.8 kg) SpO2 97% BMI 25.24 kg/m² General appearance: alert, cooperative, no distress, appears stated age Neck: supple, symmetrical, trachea midline, no adenopathy, thyroid: not enlarged, symmetric, no tenderness/mass/nodules, no carotid bruit and no JVD Lungs: clear to auscultation bilaterally Heart: regular rate and rhythm, S1, S2 normal, no murmur, click, rub or gallop Abdomen: soft, non-tender. Bowel sounds normal. No masses,  no organomegaly Extremities: extremities normal, atraumatic, no cyanosis or edema Data Review Recent Results (from the past 24 hour(s)) POST EXPOSURE PROFILE Collection Time: 01/26/19 12:37 PM  
Result Value Ref Range p24 Antigen NONREACTIVE NR    
 HIV-1,2 Ab NONREACTIVE NR Hepatitis C virus Ab >11.00 Index Hep C  virus Ab Interp. REACTIVE (A) NR Hep C  virus Ab comment Method used is [facility] Hepatitis B surface Ag <0.10 Index Hep B surface Ag Interp. NEGATIVE  NEG Assessment:  
 
Principal Problem: 
  S[facility] ([facility] (9/8/2018) Active Problems: 
  Sickle cell anemia ([facility] (10/3/2014) Hypertension (10/3/2014) Depression (10/5/2014) Plan: 1. Continue treatment of painful crises. Slow but progressive improvement noted. 2.  Continue hydration.

## 2019-01-28 LAB
BASOPHILS # BLD: 0 K/UL (ref 0–0.1)
BASOPHILS NFR BLD: 0 % (ref 0–1)
DIFFERENTIAL METHOD BLD: ABNORMAL
EOSINOPHIL # BLD: 0.4 K/UL (ref 0–0.4)
EOSINOPHIL NFR BLD: 5 % (ref 0–7)
ERYTHROCYTE [DISTWIDTH] IN BLOOD BY AUTOMATED COUNT: 20 % (ref 11.5–14.5)
HCT VFR BLD AUTO: 20.8 % (ref 35–47)
HGB BLD-MCNC: 6.9 G/DL (ref 11.5–16)
IMM GRANULOCYTES # BLD AUTO: 0 K/UL (ref 0–0.04)
IMM GRANULOCYTES NFR BLD AUTO: 0 % (ref 0–0.5)
LYMPHOCYTES # BLD: 3.7 K/UL (ref 0.8–3.5)
LYMPHOCYTES NFR BLD: 43 % (ref 12–49)
MCH RBC QN AUTO: 30.4 PG (ref 26–34)
MCHC RBC AUTO-ENTMCNC: 33.2 G/DL (ref 30–36.5)
MCV RBC AUTO: 91.6 FL (ref 80–99)
MONOCYTES # BLD: 1.5 K/UL (ref 0–1)
MONOCYTES NFR BLD: 17 % (ref 5–13)
NEUTS SEG # BLD: 3 K/UL (ref 1.8–8)
NEUTS SEG NFR BLD: 35 % (ref 32–75)
NRBC # BLD: 0.04 K/UL (ref 0–0.01)
NRBC BLD-RTO: 0.5 PER 100 WBC
PLATELET # BLD AUTO: 178 K/UL (ref 150–400)
PMV BLD AUTO: 10.5 FL (ref 8.9–12.9)
RBC # BLD AUTO: 2.27 M/UL (ref 3.8–5.2)
WBC # BLD AUTO: 8.7 K/UL (ref 3.6–11)

## 2019-01-28 PROCEDURE — 36415 COLL VENOUS BLD VENIPUNCTURE: CPT

## 2019-01-28 PROCEDURE — 65270000015 HC RM PRIVATE ONCOLOGY

## 2019-01-28 PROCEDURE — 74011250636 HC RX REV CODE- 250/636: Performed by: INTERNAL MEDICINE

## 2019-01-28 PROCEDURE — 74011250637 HC RX REV CODE- 250/637: Performed by: INTERNAL MEDICINE

## 2019-01-28 PROCEDURE — 85025 COMPLETE CBC W/AUTO DIFF WBC: CPT

## 2019-01-28 RX ADMIN — HYDROMORPHONE HYDROCHLORIDE 2 MG: 2 INJECTION, SOLUTION INTRAMUSCULAR; INTRAVENOUS; SUBCUTANEOUS at 05:35

## 2019-01-28 RX ADMIN — FOLIC ACID 1 MG: 1 TABLET ORAL at 08:05

## 2019-01-28 RX ADMIN — ONDANSETRON 4 MG: 2 INJECTION INTRAMUSCULAR; INTRAVENOUS at 03:44

## 2019-01-28 RX ADMIN — Medication 10 ML: at 14:37

## 2019-01-28 RX ADMIN — DIPHENHYDRAMINE HYDROCHLORIDE 25 MG: 50 INJECTION, SOLUTION INTRAMUSCULAR; INTRAVENOUS at 21:00

## 2019-01-28 RX ADMIN — Medication 10 ML: at 21:06

## 2019-01-28 RX ADMIN — POLYETHYLENE GLYCOL 3350 17 G: 17 POWDER, FOR SOLUTION ORAL at 08:05

## 2019-01-28 RX ADMIN — DIPHENHYDRAMINE HYDROCHLORIDE 25 MG: 50 INJECTION, SOLUTION INTRAMUSCULAR; INTRAVENOUS at 12:47

## 2019-01-28 RX ADMIN — ONDANSETRON 4 MG: 2 INJECTION INTRAMUSCULAR; INTRAVENOUS at 21:00

## 2019-01-28 RX ADMIN — HYDROMORPHONE HYDROCHLORIDE 2 MG: 2 INJECTION, SOLUTION INTRAMUSCULAR; INTRAVENOUS; SUBCUTANEOUS at 23:01

## 2019-01-28 RX ADMIN — PANTOPRAZOLE SODIUM 40 MG: 40 TABLET, DELAYED RELEASE ORAL at 08:05

## 2019-01-28 RX ADMIN — DIPHENHYDRAMINE HYDROCHLORIDE 25 MG: 50 INJECTION, SOLUTION INTRAMUSCULAR; INTRAVENOUS at 03:44

## 2019-01-28 RX ADMIN — HYDROMORPHONE HYDROCHLORIDE 2 MG: 2 INJECTION, SOLUTION INTRAMUSCULAR; INTRAVENOUS; SUBCUTANEOUS at 17:01

## 2019-01-28 RX ADMIN — HYDROMORPHONE HYDROCHLORIDE 2 MG: 2 INJECTION, SOLUTION INTRAMUSCULAR; INTRAVENOUS; SUBCUTANEOUS at 15:02

## 2019-01-28 RX ADMIN — HYDROMORPHONE HYDROCHLORIDE 2 MG: 2 INJECTION, SOLUTION INTRAMUSCULAR; INTRAVENOUS; SUBCUTANEOUS at 10:29

## 2019-01-28 RX ADMIN — PANTOPRAZOLE SODIUM 40 MG: 40 TABLET, DELAYED RELEASE ORAL at 16:30

## 2019-01-28 RX ADMIN — HYDROMORPHONE HYDROCHLORIDE 2 MG: 2 INJECTION, SOLUTION INTRAMUSCULAR; INTRAVENOUS; SUBCUTANEOUS at 08:06

## 2019-01-28 RX ADMIN — DIPHENHYDRAMINE HYDROCHLORIDE 25 MG: 50 INJECTION, SOLUTION INTRAMUSCULAR; INTRAVENOUS at 17:01

## 2019-01-28 RX ADMIN — HYDROMORPHONE HYDROCHLORIDE 2 MG: 2 INJECTION, SOLUTION INTRAMUSCULAR; INTRAVENOUS; SUBCUTANEOUS at 19:04

## 2019-01-28 RX ADMIN — DIPHENHYDRAMINE HYDROCHLORIDE 25 MG: 50 INJECTION, SOLUTION INTRAMUSCULAR; INTRAVENOUS at 08:06

## 2019-01-28 RX ADMIN — HYDROMORPHONE HYDROCHLORIDE 2 MG: 2 INJECTION, SOLUTION INTRAMUSCULAR; INTRAVENOUS; SUBCUTANEOUS at 01:41

## 2019-01-28 RX ADMIN — HYDROMORPHONE HYDROCHLORIDE 2 MG: 2 INJECTION, SOLUTION INTRAMUSCULAR; INTRAVENOUS; SUBCUTANEOUS at 21:00

## 2019-01-28 RX ADMIN — HYDROMORPHONE HYDROCHLORIDE 2 MG: 2 INJECTION, SOLUTION INTRAMUSCULAR; INTRAVENOUS; SUBCUTANEOUS at 12:47

## 2019-01-28 RX ADMIN — Medication 10 ML: at 06:48

## 2019-01-28 RX ADMIN — DEXTROSE MONOHYDRATE, SODIUM CHLORIDE, AND POTASSIUM CHLORIDE 100 ML/HR: 50; 4.5; .745 INJECTION, SOLUTION INTRAVENOUS at 09:34

## 2019-01-28 RX ADMIN — ENOXAPARIN SODIUM 40 MG: 40 INJECTION SUBCUTANEOUS at 09:35

## 2019-01-28 RX ADMIN — ONDANSETRON 4 MG: 2 INJECTION INTRAMUSCULAR; INTRAVENOUS at 17:02

## 2019-01-28 RX ADMIN — ONDANSETRON 4 MG: 2 INJECTION INTRAMUSCULAR; INTRAVENOUS at 12:46

## 2019-01-28 RX ADMIN — HYDROMORPHONE HYDROCHLORIDE 2 MG: 2 INJECTION, SOLUTION INTRAMUSCULAR; INTRAVENOUS; SUBCUTANEOUS at 03:44

## 2019-01-28 RX ADMIN — CITALOPRAM HYDROBROMIDE 10 MG: 20 TABLET ORAL at 08:05

## 2019-01-28 RX ADMIN — ONDANSETRON 4 MG: 2 INJECTION INTRAMUSCULAR; INTRAVENOUS at 08:06

## 2019-01-28 RX ADMIN — DEXTROSE MONOHYDRATE, SODIUM CHLORIDE, AND POTASSIUM CHLORIDE 100 ML/HR: 50; 4.5; .745 INJECTION, SOLUTION INTRAVENOUS at 19:59

## 2019-01-28 NOTE — PROGRESS NOTES
General Daily Progress Note Admit Date: 1/19/2019 Subjective:  
 
Patient continues to complain of pain but improving. Current Facility-Administered Medications Medication Dose Route Frequency  polyethylene glycol (MIRALAX) packet 17 g  17 g Oral DAILY  pantoprazole (PROTONIX) tablet 40 mg  40 mg Oral ACB&D  
 enoxaparin (LOVENOX) injection 40 mg  40 mg SubCUTAneous Q24H  
 dextrose 5% - 0.45% NaCl with KCl 10 mEq/L infusion  100 mL/hr IntraVENous CONTINUOUS  
 HYDROmorphone (PF) (DILAUDID) injection 1-2 mg  1-2 mg IntraVENous Q2H PRN  
 citalopram (CELEXA) tablet 10 mg  10 mg Oral DAILY  folic acid (FOLVITE) tablet 1 mg  1 mg Oral DAILY  nitroglycerin (NITROBID) 2 % ointment 2 Inch  2 Inch Topical Q6H PRN  
 diphenhydrAMINE (BENADRYL) injection 25 mg  25 mg IntraVENous Q4H PRN  
 sodium chloride (NS) flush 5-40 mL  5-40 mL IntraVENous Q8H  
 sodium chloride (NS) flush 5-40 mL  5-40 mL IntraVENous PRN  
 naloxone (NARCAN) injection 0.4 mg  0.4 mg IntraVENous PRN  
 ondansetron (ZOFRAN) injection 4 mg  4 mg IntraVENous Q4H PRN  
 bisacodyl (DULCOLAX) suppository 10 mg  10 mg Rectal DAILY PRN Review of Systems A comprehensive review of systems was negative. Objective:  
 
Patient Vitals for the past 24 hrs: 
 BP Temp Pulse Resp SpO2  
01/28/19 0749 119/64 98.8 °F (37.1 °C) 92 18 100 % 01/27/19 2339 134/76 99.1 °F (37.3 °C) (!) 102 18 92 % 01/27/19 1957 138/80 98.6 °F (37 °C) (!) 101 17 95 % 01/27/19 1549 120/76 99.1 °F (37.3 °C) 94 16 97 % No intake/output data recorded. 01/26 1901 - 01/28 0700 In: 3233.3 [P.O.:800; I.V.:2433.3] Out: - Physical Exam:  
Visit Vitals /64 (BP 1 Location: Right arm, BP Patient Position: At rest) Pulse 92 Temp 98.8 °F (37.1 °C) Resp 18 Ht 5' 10\" (1.778 m) Wt 175 lb 14.8 oz (79.8 kg) SpO2 100% BMI 25.24 kg/m² General appearance: alert, cooperative, no distress, appears stated age Neck: supple, symmetrical, trachea midline, no adenopathy, thyroid: not enlarged, symmetric, no tenderness/mass/nodules, no carotid bruit and no JVD Lungs: clear to auscultation bilaterally Heart: regular rate and rhythm, S1, S2 normal, no murmur, click, rub or gallop Abdomen: soft, non-tender. Bowel sounds normal. No masses,  no organomegaly Extremities: extremities normal, atraumatic, no cyanosis or edema Data Review Recent Results (from the past 24 hour(s)) METABOLIC PANEL, BASIC Collection Time: 01/27/19 11:41 AM  
Result Value Ref Range Sodium 136 136 - 145 mmol/L Potassium 3.9 3.5 - 5.1 mmol/L Chloride 99 97 - 108 mmol/L  
 CO2 33 (H) 21 - 32 mmol/L Anion gap 4 (L) 5 - 15 mmol/L Glucose 93 65 - 100 mg/dL BUN 5 (L) 6 - 20 MG/DL Creatinine 0.75 0.55 - 1.02 MG/DL  
 BUN/Creatinine ratio 7 (L) 12 - 20 GFR est AA >60 >60 ml/min/1.73m2 GFR est non-AA >60 >60 ml/min/1.73m2 Calcium 8.6 8.5 - 10.1 MG/DL  
CBC WITH AUTOMATED DIFF Collection Time: 01/28/19  5:03 AM  
Result Value Ref Range WBC 8.7 3.6 - 11.0 K/uL  
 RBC 2.27 (L) 3.80 - 5.20 M/uL HGB 6.9 (L) 11.5 - 16.0 g/dL HCT 20.8 (L) 35.0 - 47.0 % MCV 91.6 80.0 - 99.0 FL  
 MCH 30.4 26.0 - 34.0 PG  
 MCHC 33.2 30.0 - 36.5 g/dL RDW 20.0 (H) 11.5 - 14.5 % PLATELET 349 857 - 619 K/uL MPV 10.5 8.9 - 12.9 FL  
 NRBC 0.5 (H) 0  WBC ABSOLUTE NRBC 0.04 (H) 0.00 - 0.01 K/uL NEUTROPHILS 35 32 - 75 % LYMPHOCYTES 43 12 - 49 % MONOCYTES 17 (H) 5 - 13 % EOSINOPHILS 5 0 - 7 % BASOPHILS 0 0 - 1 % IMMATURE GRANULOCYTES 0 0.0 - 0.5 % ABS. NEUTROPHILS 3.0 1.8 - 8.0 K/UL  
 ABS. LYMPHOCYTES 3.7 (H) 0.8 - 3.5 K/UL  
 ABS. MONOCYTES 1.5 (H) 0.0 - 1.0 K/UL  
 ABS. EOSINOPHILS 0.4 0.0 - 0.4 K/UL  
 ABS. BASOPHILS 0.0 0.0 - 0.1 K/UL  
 ABS. IMM. GRANS. 0.0 0.00 - 0.04 K/UL  
 DF AUTOMATED Assessment:  
 
Principal Problem: 
  [facility] (9/8/2018) Active Problems: 
  Sickle cell anemia ([facility] (10/3/2014) Hypertension (10/3/2014) Depression (10/5/2014) Plan: 1. Continue treatment of painful crises. We will begin weaning process tomorrow. Hemoglobin unchanged. Patient has no interest in transfusion.

## 2019-01-28 NOTE — ROUTINE PROCESS
Oncology End of Shift Note Bedside shift change report given to Ginger Ha RN (incoming nurse) by Sesar Lee (outgoing nurse) on [place] [facility]. Report included the following information SBAR, Kardex, MAR and Recent Results. Shift Summary: Minimal pain control, multiple PRN pain doses administered Issues for Physician to Address:   
 
Patient on Cardiac Monitoring? [] Yes 
[x] No 
 
Rhythm:   
 
 
 
Shift Events Sesar Lee

## 2019-01-28 NOTE — PROGRESS NOTES
Oncology End of Shift Note Bedside shift change report given to Camilo Hinojosa (incoming nurse) by Melissa Kim RN (outgoing nurse) on [facility]. Report included the following information SBAR. Shift Summary:  
 
 
Issues for Physician to Address:   
 
Patient on Cardiac Monitoring? [] Yes 
[x] No 
 
Rhythm:   
 
 
 
Shift Events Pain control, IV fluids, meds,meals,continous pulse ox Melissa Kim RN

## 2019-01-28 NOTE — PROGRESS NOTES
Oncology Interdisciplinary rounds were held today to discuss patient plan of care and outcomes. The following members were present: Nursing, Case Management, Pharmacy and Dietary. Actual Length of Stay: 9 DRG GLOS: 2.7 Expected Length of Stay: 2d 16h Plan            Discharge Pain control Transition to PO pain medications Home 1/31

## 2019-01-28 NOTE — PROGRESS NOTES
Oncology End of Shift Note Bedside shift change report given to , RN (incoming nurse) by Bisi Phan RN (outgoing nurse) on [facility]. Report included the following information SBAR, Kardex, Intake/Output, MAR, Accordion and Recent Results. Shift Summary: Patient verbalizing she wishes to be discharged this week so she can attend her Daughters wedding this weekend. She stated she anticipated weaning off IV Dilaudid to PO tomorrow. Continuous pulse ox at bedside; Pt O2 sat high 90s Issues for Physician to Address:  Patient verbalizing she wishes to be discharged this week so she can attend her Daughters wedding this weekend. She stated she anticipated weaning off IV Dilaudid to PO tomorrow. Continuous pulse ox at bedside; Pt O2 sat high 90s Patient on Cardiac Monitoring? [] Yes 
[x] No 
 
Rhythm:   
 
 
 
Shift Events Bisi Phan RN

## 2019-01-28 NOTE — PROGRESS NOTES
Spiritual Care Assessment/Progress Note [address] 
 
 
NAME: Sophia Egan      MRN: [id number] AGE: 66 y.o. SEX: female Adventism Affiliation: Non Protestant  
Language: English  
 
1/28/2019     Total Time (in minutes): 12 Spiritual Assessment begun in [facility] through conversation with: 
  
    []Patient        [] Family    [] Friend(s) Reason for Consult: Initial/Spiritual assessment, patient floor Spiritual beliefs: (Please include comment if needed) 
   [] Identifies with a zay tradition:     
   [] Supported by a zay community:        
   [] Claims no spiritual orientation:       
   [] Seeking spiritual identity:            
   [] Adheres to an individual form of spirituality:       
   [x] Not able to assess:                   
 
    
Identified resources for coping:  
   [] Prayer                           
   [] Music                  [] Guided Imagery 
   [] Family/friends                 [] Pet visits [] Devotional reading                         [x] Unknown 
   [] Other:                                         
 
 
Interventions offered during this visit: (See comments for more details) Patient Interventions: Initial visit Plan of Care: 
 
 [x] Support spiritual and/or cultural needs  
 [] Support AMD and/or advance care planning process    
 [] Support grieving process 
 [] Coordinate Rites and/or Rituals  
 [] Coordination with community clergy [] No spiritual needs identified at this time 
 [] Detailed Plan of Care below (See Comments)  [] Make referral to Music Therapy 
[] Make referral to Pet Therapy    
[] Make referral to Addiction services 
[] Make referral to [facility] 
[] Make referral to Spiritual Care Partner 
[] No future visits requested       
[x] Follow up visits as needed Comments:    Attempted Initial Spiritual Assessment in Oncology.  Unable to assess patients needs. Patient appeared to be resting comfortably. No family present at this time. Left a pastoral care note for patient. Chaplains will follow as able and/or as needed. ROGE Jones, [address] [facility],  [facility]  Paging Service  [phone]

## 2019-01-28 NOTE — PROGRESS NOTES
Oncology End of Shift Note Bedside shift change report given to SAMIR hough (incoming nurse) by Wilian Knowles RN (outgoing nurse) on [facility]. Report included the following information SBAR. Shift Summary: Pain control, monitor labs, VS, and pulse ox. IV hydration. Issues for Physician to Address:  
 
Patient on Cardiac Monitoring? [] Yes 
[x] No 
 
Rhythm:   
 
 
 
Shift Events Pain meds, IV, monitoring VS,continuos pulse ox Wilian Knowles RN

## 2019-01-29 PROCEDURE — 94760 N-INVAS EAR/PLS OXIMETRY 1: CPT

## 2019-01-29 PROCEDURE — 74011250636 HC RX REV CODE- 250/636: Performed by: INTERNAL MEDICINE

## 2019-01-29 PROCEDURE — 74011250637 HC RX REV CODE- 250/637: Performed by: INTERNAL MEDICINE

## 2019-01-29 PROCEDURE — 77010033678 HC OXYGEN DAILY

## 2019-01-29 PROCEDURE — 65270000015 HC RM PRIVATE ONCOLOGY

## 2019-01-29 RX ORDER — HYDROMORPHONE HYDROCHLORIDE 2 MG/ML
1 INJECTION, SOLUTION INTRAMUSCULAR; INTRAVENOUS; SUBCUTANEOUS
Status: DISCONTINUED | OUTPATIENT
Start: 2019-01-29 | End: 2019-01-30

## 2019-01-29 RX ADMIN — ONDANSETRON 4 MG: 2 INJECTION INTRAMUSCULAR; INTRAVENOUS at 18:34

## 2019-01-29 RX ADMIN — DEXTROSE MONOHYDRATE, SODIUM CHLORIDE, AND POTASSIUM CHLORIDE 75 ML/HR: 50; 4.5; .745 INJECTION, SOLUTION INTRAVENOUS at 17:55

## 2019-01-29 RX ADMIN — HYDROMORPHONE HYDROCHLORIDE 1 MG: 2 INJECTION, SOLUTION INTRAMUSCULAR; INTRAVENOUS; SUBCUTANEOUS at 18:37

## 2019-01-29 RX ADMIN — DIPHENHYDRAMINE HYDROCHLORIDE 25 MG: 50 INJECTION, SOLUTION INTRAMUSCULAR; INTRAVENOUS at 05:12

## 2019-01-29 RX ADMIN — POLYETHYLENE GLYCOL 3350 17 G: 17 POWDER, FOR SOLUTION ORAL at 08:59

## 2019-01-29 RX ADMIN — HYDROMORPHONE HYDROCHLORIDE 1 MG: 2 INJECTION, SOLUTION INTRAMUSCULAR; INTRAVENOUS; SUBCUTANEOUS at 22:41

## 2019-01-29 RX ADMIN — Medication 10 ML: at 05:14

## 2019-01-29 RX ADMIN — DIPHENHYDRAMINE HYDROCHLORIDE 25 MG: 50 INJECTION, SOLUTION INTRAMUSCULAR; INTRAVENOUS at 00:58

## 2019-01-29 RX ADMIN — HYDROMORPHONE HYDROCHLORIDE 2 MG: 2 INJECTION, SOLUTION INTRAMUSCULAR; INTRAVENOUS; SUBCUTANEOUS at 05:12

## 2019-01-29 RX ADMIN — HYDROMORPHONE HYDROCHLORIDE 2 MG: 2 INJECTION, SOLUTION INTRAMUSCULAR; INTRAVENOUS; SUBCUTANEOUS at 02:58

## 2019-01-29 RX ADMIN — DIPHENHYDRAMINE HYDROCHLORIDE 25 MG: 50 INJECTION, SOLUTION INTRAMUSCULAR; INTRAVENOUS at 22:41

## 2019-01-29 RX ADMIN — Medication 10 ML: at 22:41

## 2019-01-29 RX ADMIN — DIPHENHYDRAMINE HYDROCHLORIDE 25 MG: 50 INJECTION, SOLUTION INTRAMUSCULAR; INTRAVENOUS at 18:36

## 2019-01-29 RX ADMIN — ONDANSETRON 4 MG: 2 INJECTION INTRAMUSCULAR; INTRAVENOUS at 05:12

## 2019-01-29 RX ADMIN — Medication 10 ML: at 14:12

## 2019-01-29 RX ADMIN — FOLIC ACID 1 MG: 1 TABLET ORAL at 08:56

## 2019-01-29 RX ADMIN — PANTOPRAZOLE SODIUM 40 MG: 40 TABLET, DELAYED RELEASE ORAL at 16:52

## 2019-01-29 RX ADMIN — DEXTROSE MONOHYDRATE, SODIUM CHLORIDE, AND POTASSIUM CHLORIDE 100 ML/HR: 50; 4.5; .745 INJECTION, SOLUTION INTRAVENOUS at 05:50

## 2019-01-29 RX ADMIN — DIPHENHYDRAMINE HYDROCHLORIDE 25 MG: 50 INJECTION, SOLUTION INTRAMUSCULAR; INTRAVENOUS at 14:13

## 2019-01-29 RX ADMIN — HYDROMORPHONE HYDROCHLORIDE 2 MG: 2 INJECTION, SOLUTION INTRAMUSCULAR; INTRAVENOUS; SUBCUTANEOUS at 00:57

## 2019-01-29 RX ADMIN — ENOXAPARIN SODIUM 40 MG: 40 INJECTION SUBCUTANEOUS at 09:54

## 2019-01-29 RX ADMIN — HYDROMORPHONE HYDROCHLORIDE 1 MG: 2 INJECTION, SOLUTION INTRAMUSCULAR; INTRAVENOUS; SUBCUTANEOUS at 09:58

## 2019-01-29 RX ADMIN — DIPHENHYDRAMINE HYDROCHLORIDE 25 MG: 50 INJECTION, SOLUTION INTRAMUSCULAR; INTRAVENOUS at 09:56

## 2019-01-29 RX ADMIN — ONDANSETRON 4 MG: 2 INJECTION INTRAMUSCULAR; INTRAVENOUS at 22:41

## 2019-01-29 RX ADMIN — ONDANSETRON 4 MG: 2 INJECTION INTRAMUSCULAR; INTRAVENOUS at 09:55

## 2019-01-29 RX ADMIN — ONDANSETRON 4 MG: 2 INJECTION INTRAMUSCULAR; INTRAVENOUS at 14:12

## 2019-01-29 RX ADMIN — HYDROMORPHONE HYDROCHLORIDE 1 MG: 2 INJECTION, SOLUTION INTRAMUSCULAR; INTRAVENOUS; SUBCUTANEOUS at 14:15

## 2019-01-29 RX ADMIN — PANTOPRAZOLE SODIUM 40 MG: 40 TABLET, DELAYED RELEASE ORAL at 08:56

## 2019-01-29 RX ADMIN — ONDANSETRON 4 MG: 2 INJECTION INTRAMUSCULAR; INTRAVENOUS at 00:57

## 2019-01-29 RX ADMIN — CITALOPRAM HYDROBROMIDE 10 MG: 20 TABLET ORAL at 08:56

## 2019-01-29 NOTE — PROGRESS NOTES
Problem: Falls - Risk of 
Goal: *Absence of Falls Document Christina Ferrer Fall Risk and appropriate interventions in the flowsheet. Fall Risk Interventions: 
Mobility Interventions: Communicate number of staff needed for ambulation/transfer Mentation Interventions: Adequate sleep, hydration, pain control Medication Interventions: Evaluate medications/consider consulting pharmacy History of Falls Interventions: Door open when patient unattended

## 2019-01-29 NOTE — PROGRESS NOTES
Spiritual Care Assessment/Progress Note [facility] 
 
 
NAME: Pete Aase      MRN: [id number] AGE: 66 y.o. SEX: female Oriental orthodox Affiliation: Non Mandaeism  
Language: English  
 
1/29/2019     Total Time (in minutes): 7 Spiritual Assessment begun in [facility] ONCOL[facility] through conversation with: 
  
    [x]Patient        [] Family    [] Friend(s) Reason for Consult: Initial/Spiritual assessment, patient floor Spiritual beliefs: (Please include comment if needed) [x] Identifies with a zay tradition:     
   [] Supported by a zay community:        
   [] Claims no spiritual orientation:       
   [] Seeking spiritual identity:            
   [] Adheres to an individual form of spirituality:       
   [] Not able to assess:                   
 
    
Identified resources for coping:  
   [] Prayer                           
   [] Music                  [] Guided Imagery [x] Family/friends                 [] Pet visits [] Devotional reading                         [] Unknown 
   [] Other:                                       
 
 
Interventions offered during this visit: (See comments for more details) Patient Interventions: Affirmation of emotions/emotional suffering, Affirmation of zay, Initial/Spiritual assessment, patient floor Plan of Care: 
 
 [] Support spiritual and/or cultural needs  
 [] Support AMD and/or advance care planning process    
 [] Support grieving process 
 [] Coordinate Rites and/or Rituals  
 [] Coordination with community clergy [] No spiritual needs identified at this time 
 [] Detailed Plan of Care below (See Comments)  [] Make referral to Music Therapy 
[] Make referral to Pet Therapy    
[] Make referral to Addiction services 
[] Make referral to [facility] 
[] Make referral to Spiritual Care Partner 
[] No future visits requested       
[x] Follow up visits as needed Comments:Initial spiritual assessment in Oncology. Patient/family shared about zay and concerns. Provided effective listening. Consulted with patient. Advised of  Availability. [address]  Paging Service [phone])

## 2019-01-29 NOTE — PROGRESS NOTES
General Daily Progress Note Admit Date: 1/19/2019 Subjective:  
 
Patient has no complaint . Current Facility-Administered Medications Medication Dose Route Frequency  HYDROmorphone (PF) (DILAUDID) injection 1 mg  1 mg IntraVENous Q4H PRN  polyethylene glycol (MIRALAX) packet 17 g  17 g Oral DAILY  pantoprazole (PROTONIX) tablet 40 mg  40 mg Oral ACB&D  
 enoxaparin (LOVENOX) injection 40 mg  40 mg SubCUTAneous Q24H  
 dextrose 5% - 0.45% NaCl with KCl 10 mEq/L infusion  75 mL/hr IntraVENous CONTINUOUS  
 citalopram (CELEXA) tablet 10 mg  10 mg Oral DAILY  folic acid (FOLVITE) tablet 1 mg  1 mg Oral DAILY  nitroglycerin (NITROBID) 2 % ointment 2 Inch  2 Inch Topical Q6H PRN  
 diphenhydrAMINE (BENADRYL) injection 25 mg  25 mg IntraVENous Q4H PRN  
 sodium chloride (NS) flush 5-40 mL  5-40 mL IntraVENous Q8H  
 sodium chloride (NS) flush 5-40 mL  5-40 mL IntraVENous PRN  
 naloxone (NARCAN) injection 0.4 mg  0.4 mg IntraVENous PRN  
 ondansetron (ZOFRAN) injection 4 mg  4 mg IntraVENous Q4H PRN  
 bisacodyl (DULCOLAX) suppository 10 mg  10 mg Rectal DAILY PRN Review of Systems A comprehensive review of systems was negative. Objective:  
 
Patient Vitals for the past 24 hrs: 
 BP Temp Pulse Resp SpO2  
01/29/19 0745 98/52 98.4 °F (36.9 °C) 91 16 97 % 01/28/19 2253 133/74 98.9 °F (37.2 °C) 98 18 100 % 01/28/19 2010 138/75 99 °F (37.2 °C) (!) 102 18 99 % 01/28/19 1514 130/73 99.2 °F (37.3 °C) 95 18 100 % No intake/output data recorded. 01/27 1901 - 01/29 0700 In: 4360 [I.V.:4360] Out: - Physical Exam:  
Visit Vitals BP 98/52 (BP 1 Location: Right arm, BP Patient Position: At rest) Pulse 91 Temp 98.4 °F (36.9 °C) Resp 16 Ht 5' 10\" (1.778 m) Wt 175 lb 14.8 oz (79.8 kg) SpO2 97% BMI 25.24 kg/m² General appearance: alert, cooperative, no distress, appears stated age Neck: supple, symmetrical, trachea midline, no adenopathy, thyroid: not enlarged, symmetric, no tenderness/mass/nodules, no carotid bruit and no JVD Lungs: clear to auscultation bilaterally Heart: regular rate and rhythm, S1, S2 normal, no murmur, click, rub or gallop Abdomen: soft, non-tender. Bowel sounds normal. No masses,  no organomegaly Extremities: extremities normal, atraumatic, no cyanosis or edema Data Review No results found for this or any previous visit (from the past 24 hour(s)). Assessment:  
 
Principal Problem: 
  [facility] (9/8/2018) Active Problems: 
  Sickle cell anemia ([facility] (10/3/2014) Hypertension (10/3/2014) Depression (10/5/2014) Plan: 1. Continue treatment of painful crises. Will reduce Dilaudid. 2.  Encourage out of bed activities. 3.  Hydration adequate.

## 2019-01-29 NOTE — PROGRESS NOTES
Oncology End of Shift Note Bedside shift change report given to Lamont Mae RN (incoming nurse) by Vickie Gtz (outgoing nurse) on [facility]. Report included the following information SBAR, Kardex, MAR and Recent Results. Shift Summary: No acute changes Issues for Physician to Address:  Weaning off IV Dilaudid Patient on Cardiac Monitoring? [] Yes 
[x] No 
 
Rhythm:   
 
 
 
Shift Events Vickie Gtz

## 2019-01-30 PROCEDURE — 74011250637 HC RX REV CODE- 250/637: Performed by: INTERNAL MEDICINE

## 2019-01-30 PROCEDURE — 74011250636 HC RX REV CODE- 250/636: Performed by: INTERNAL MEDICINE

## 2019-01-30 PROCEDURE — 94760 N-INVAS EAR/PLS OXIMETRY 1: CPT

## 2019-01-30 PROCEDURE — 65270000015 HC RM PRIVATE ONCOLOGY

## 2019-01-30 RX ORDER — FENTANYL 75 UG/H
1 PATCH TRANSDERMAL
Status: DISCONTINUED | OUTPATIENT
Start: 2019-01-30 | End: 2019-02-01 | Stop reason: HOSPADM

## 2019-01-30 RX ORDER — HYDROMORPHONE HYDROCHLORIDE 2 MG/ML
1 INJECTION, SOLUTION INTRAMUSCULAR; INTRAVENOUS; SUBCUTANEOUS
Status: DISCONTINUED | OUTPATIENT
Start: 2019-01-30 | End: 2019-02-01 | Stop reason: HOSPADM

## 2019-01-30 RX ORDER — HYDROCODONE BITARTRATE AND ACETAMINOPHEN 10; 325 MG/1; MG/1
1 TABLET ORAL
Status: DISCONTINUED | OUTPATIENT
Start: 2019-01-30 | End: 2019-02-01 | Stop reason: HOSPADM

## 2019-01-30 RX ADMIN — HYDROMORPHONE HYDROCHLORIDE 1 MG: 2 INJECTION, SOLUTION INTRAMUSCULAR; INTRAVENOUS; SUBCUTANEOUS at 20:12

## 2019-01-30 RX ADMIN — Medication 10 ML: at 03:28

## 2019-01-30 RX ADMIN — POLYETHYLENE GLYCOL 3350 17 G: 17 POWDER, FOR SOLUTION ORAL at 08:44

## 2019-01-30 RX ADMIN — PANTOPRAZOLE SODIUM 40 MG: 40 TABLET, DELAYED RELEASE ORAL at 08:44

## 2019-01-30 RX ADMIN — ONDANSETRON 4 MG: 2 INJECTION INTRAMUSCULAR; INTRAVENOUS at 20:12

## 2019-01-30 RX ADMIN — CITALOPRAM HYDROBROMIDE 10 MG: 20 TABLET ORAL at 08:44

## 2019-01-30 RX ADMIN — ENOXAPARIN SODIUM 40 MG: 40 INJECTION SUBCUTANEOUS at 10:18

## 2019-01-30 RX ADMIN — HYDROCODONE BITARTRATE AND ACETAMINOPHEN 1 TABLET: 10; 325 TABLET ORAL at 14:02

## 2019-01-30 RX ADMIN — DIPHENHYDRAMINE HYDROCHLORIDE 25 MG: 50 INJECTION, SOLUTION INTRAMUSCULAR; INTRAVENOUS at 08:45

## 2019-01-30 RX ADMIN — ONDANSETRON 4 MG: 2 INJECTION INTRAMUSCULAR; INTRAVENOUS at 14:03

## 2019-01-30 RX ADMIN — HYDROMORPHONE HYDROCHLORIDE 1 MG: 2 INJECTION, SOLUTION INTRAMUSCULAR; INTRAVENOUS; SUBCUTANEOUS at 08:45

## 2019-01-30 RX ADMIN — Medication 10 ML: at 22:23

## 2019-01-30 RX ADMIN — DIPHENHYDRAMINE HYDROCHLORIDE 25 MG: 50 INJECTION, SOLUTION INTRAMUSCULAR; INTRAVENOUS at 03:27

## 2019-01-30 RX ADMIN — HYDROMORPHONE HYDROCHLORIDE 1 MG: 2 INJECTION, SOLUTION INTRAMUSCULAR; INTRAVENOUS; SUBCUTANEOUS at 03:28

## 2019-01-30 RX ADMIN — HYDROCODONE BITARTRATE AND ACETAMINOPHEN 1 TABLET: 10; 325 TABLET ORAL at 22:23

## 2019-01-30 RX ADMIN — DEXTROSE MONOHYDRATE, SODIUM CHLORIDE, AND POTASSIUM CHLORIDE 75 ML/HR: 50; 4.5; .745 INJECTION, SOLUTION INTRAVENOUS at 08:40

## 2019-01-30 RX ADMIN — DIPHENHYDRAMINE HYDROCHLORIDE 25 MG: 50 INJECTION, SOLUTION INTRAMUSCULAR; INTRAVENOUS at 20:13

## 2019-01-30 RX ADMIN — DIPHENHYDRAMINE HYDROCHLORIDE 25 MG: 50 INJECTION, SOLUTION INTRAMUSCULAR; INTRAVENOUS at 14:03

## 2019-01-30 RX ADMIN — PANTOPRAZOLE SODIUM 40 MG: 40 TABLET, DELAYED RELEASE ORAL at 17:37

## 2019-01-30 RX ADMIN — ONDANSETRON 4 MG: 2 INJECTION INTRAMUSCULAR; INTRAVENOUS at 03:27

## 2019-01-30 RX ADMIN — ONDANSETRON 4 MG: 2 INJECTION INTRAMUSCULAR; INTRAVENOUS at 08:45

## 2019-01-30 RX ADMIN — FOLIC ACID 1 MG: 1 TABLET ORAL at 08:44

## 2019-01-30 NOTE — PROGRESS NOTES
Problem: Falls - Risk of 
Goal: *Absence of Falls Document Nickolas Boucher Fall Risk and appropriate interventions in the flowsheet. Outcome: Progressing Towards Goal 
Fall Risk Interventions: 
Mobility Interventions: Communicate number of staff needed for ambulation/transfer Mentation Interventions: Increase mobility Medication Interventions: Patient to call before getting OOB, Teach patient to arise slowly History of Falls Interventions: Evaluate medications/consider consulting pharmacy

## 2019-01-30 NOTE — PROGRESS NOTES
Oncology End of Shift Note Bedside shift change report given to Manoj RN (incoming nurse) by Aurelia Ryenoso (outgoing nurse) on [facility]. Report included the following information SBAR, Kardex, Intake/Output, MAR and Accordion. Shift Summary: Pt remained stable throughout the shift. Pt walked through halls x2 Issues for Physician to Address:   
 
Patient on Cardiac Monitoring? [] Yes 
[x] No 
 
Rhythm:   
 
 
 
Shift Events Aurelia Reynoso

## 2019-01-30 NOTE — PROGRESS NOTES
General Daily Progress Note Admit Date: 1/19/2019 Subjective:  
 
Patient continues to complain of pain but improving. Current Facility-Administered Medications Medication Dose Route Frequency  HYDROmorphone (PF) (DILAUDID) injection 1 mg  1 mg IntraVENous Q6H PRN  
 HYDROcodone-acetaminophen (NORCO)  mg tablet 1 Tab  1 Tab Oral Q6H PRN  
 fentaNYL (DURAGESIC) 75 mcg/hr patch 1 Patch  1 Patch TransDERmal Q72H  polyethylene glycol (MIRALAX) packet 17 g  17 g Oral DAILY  pantoprazole (PROTONIX) tablet 40 mg  40 mg Oral ACB&D  
 enoxaparin (LOVENOX) injection 40 mg  40 mg SubCUTAneous Q24H  
 dextrose 5% - 0.45% NaCl with KCl 10 mEq/L infusion  50 mL/hr IntraVENous CONTINUOUS  
 citalopram (CELEXA) tablet 10 mg  10 mg Oral DAILY  folic acid (FOLVITE) tablet 1 mg  1 mg Oral DAILY  nitroglycerin (NITROBID) 2 % ointment 2 Inch  2 Inch Topical Q6H PRN  
 diphenhydrAMINE (BENADRYL) injection 25 mg  25 mg IntraVENous Q4H PRN  
 sodium chloride (NS) flush 5-40 mL  5-40 mL IntraVENous Q8H  
 sodium chloride (NS) flush 5-40 mL  5-40 mL IntraVENous PRN  
 naloxone (NARCAN) injection 0.4 mg  0.4 mg IntraVENous PRN  
 ondansetron (ZOFRAN) injection 4 mg  4 mg IntraVENous Q4H PRN  
 bisacodyl (DULCOLAX) suppository 10 mg  10 mg Rectal DAILY PRN Review of Systems A comprehensive review of systems was negative. Objective:  
 
Patient Vitals for the past 24 hrs: 
 BP Temp Pulse Resp SpO2  
01/30/19 0749 127/78 98.4 °F (36.9 °C) 85 16 100 % 01/29/19 2337 113/81 98.7 °F (37.1 °C) 89 16 99 % 01/29/19 1920 145/79 99.7 °F (37.6 °C) 95 16 96 % 01/29/19 1500 126/71 99.8 °F (37.7 °C) 98 16 96 % No intake/output data recorded. 01/28 1901 - 01/30 0700 In: 3126.7 [I.V.:3126.7] Out: - Physical Exam:  
Visit Vitals /78 (BP 1 Location: Right arm, BP Patient Position: At rest) Pulse 85 Temp 98.4 °F (36.9 °C) Resp 16 Ht 5' 10\" (1.778 m) Wt 175 lb 14.8 oz (79.8 kg) SpO2 100% BMI 25.24 kg/m² General appearance: alert, cooperative, no distress, appears stated age Neck: supple, symmetrical, trachea midline, no adenopathy, thyroid: not enlarged, symmetric, no tenderness/mass/nodules, no carotid bruit and no JVD Lungs: clear to auscultation bilaterally Heart: regular rate and rhythm, S1, S2 normal, no murmur, click, rub or gallop Abdomen: soft, non-tender. Bowel sounds normal. No masses,  no organomegaly Extremities: extremities normal, atraumatic, no cyanosis or edema Data Review No results found for this or any previous visit (from the past 24 hour(s)). Assessment:  
 
Principal Problem: 
  [facility] (9/8/2018) Active Problems: 
  Sickle cell anemia ([facility] (10/3/2014) Hypertension (10/3/2014) Depression (10/5/2014) Plan: 1. Continue to transition to p.o. Analgesics. She is gradually improving.

## 2019-01-30 NOTE — PROGRESS NOTES
Bedside and Verbal shift change report given to Khang Tony RN (oncoming nurse) by SAMIR Aranda (offgoing nurse). Report included the following information SBAR, Kardex, Intake/Output and Recent Results. Bedside and Verbal shift change report given to Judy Euceda RN (oncoming nurse) by Khang Tony RN (offgoing nurse). Report included the following information SBAR, Kardex, Intake/Output and Med Rec Status.

## 2019-01-30 NOTE — PROGRESS NOTES
Oncology End of Shift Note Bedside shift change report given to Deneen Cotton RN (incoming nurse) by Emanuel Moyer RN (outgoing nurse) on [facility]. Report included the following information SBAR, Kardex, MAR and Accordion. Shift Summary: Pt medicated twice for pain, on continuous pulse ox with sats in the upper 90s Issues for Physician to Address: Add on po medication for pain Patient on Cardiac Monitoring? [] Yes 
[x] No 
 
Rhythm:  
 
 
 
Shift Events Emanuel Moyer RN

## 2019-01-31 PROCEDURE — 74011250636 HC RX REV CODE- 250/636: Performed by: INTERNAL MEDICINE

## 2019-01-31 PROCEDURE — 74011250637 HC RX REV CODE- 250/637: Performed by: INTERNAL MEDICINE

## 2019-01-31 PROCEDURE — 65270000015 HC RM PRIVATE ONCOLOGY

## 2019-01-31 RX ADMIN — HYDROMORPHONE HYDROCHLORIDE 1 MG: 2 INJECTION, SOLUTION INTRAMUSCULAR; INTRAVENOUS; SUBCUTANEOUS at 22:19

## 2019-01-31 RX ADMIN — HYDROCODONE BITARTRATE AND ACETAMINOPHEN 1 TABLET: 10; 325 TABLET ORAL at 20:53

## 2019-01-31 RX ADMIN — DEXTROSE MONOHYDRATE, SODIUM CHLORIDE, AND POTASSIUM CHLORIDE 50 ML/HR: 50; 4.5; .745 INJECTION, SOLUTION INTRAVENOUS at 04:10

## 2019-01-31 RX ADMIN — HYDROMORPHONE HYDROCHLORIDE 1 MG: 2 INJECTION, SOLUTION INTRAMUSCULAR; INTRAVENOUS; SUBCUTANEOUS at 02:26

## 2019-01-31 RX ADMIN — Medication 10 ML: at 15:04

## 2019-01-31 RX ADMIN — ONDANSETRON 4 MG: 2 INJECTION INTRAMUSCULAR; INTRAVENOUS at 15:03

## 2019-01-31 RX ADMIN — POLYETHYLENE GLYCOL 3350 17 G: 17 POWDER, FOR SOLUTION ORAL at 10:52

## 2019-01-31 RX ADMIN — DIPHENHYDRAMINE HYDROCHLORIDE 25 MG: 50 INJECTION, SOLUTION INTRAMUSCULAR; INTRAVENOUS at 02:26

## 2019-01-31 RX ADMIN — Medication 10 ML: at 05:39

## 2019-01-31 RX ADMIN — HYDROCODONE BITARTRATE AND ACETAMINOPHEN 1 TABLET: 10; 325 TABLET ORAL at 06:38

## 2019-01-31 RX ADMIN — DEXTROSE MONOHYDRATE, SODIUM CHLORIDE, AND POTASSIUM CHLORIDE 50 ML/HR: 50; 4.5; .745 INJECTION, SOLUTION INTRAVENOUS at 22:48

## 2019-01-31 RX ADMIN — ONDANSETRON 4 MG: 2 INJECTION INTRAMUSCULAR; INTRAVENOUS at 10:20

## 2019-01-31 RX ADMIN — CITALOPRAM HYDROBROMIDE 10 MG: 20 TABLET ORAL at 10:19

## 2019-01-31 RX ADMIN — DIPHENHYDRAMINE HYDROCHLORIDE 25 MG: 50 INJECTION, SOLUTION INTRAMUSCULAR; INTRAVENOUS at 10:20

## 2019-01-31 RX ADMIN — HYDROMORPHONE HYDROCHLORIDE 1 MG: 2 INJECTION, SOLUTION INTRAMUSCULAR; INTRAVENOUS; SUBCUTANEOUS at 10:19

## 2019-01-31 RX ADMIN — Medication 10 ML: at 22:22

## 2019-01-31 RX ADMIN — DIPHENHYDRAMINE HYDROCHLORIDE 25 MG: 50 INJECTION, SOLUTION INTRAMUSCULAR; INTRAVENOUS at 15:02

## 2019-01-31 RX ADMIN — ONDANSETRON 4 MG: 2 INJECTION INTRAMUSCULAR; INTRAVENOUS at 02:26

## 2019-01-31 RX ADMIN — HYDROMORPHONE HYDROCHLORIDE 1 MG: 2 INJECTION, SOLUTION INTRAMUSCULAR; INTRAVENOUS; SUBCUTANEOUS at 16:20

## 2019-01-31 RX ADMIN — ENOXAPARIN SODIUM 40 MG: 40 INJECTION SUBCUTANEOUS at 10:52

## 2019-01-31 RX ADMIN — PANTOPRAZOLE SODIUM 40 MG: 40 TABLET, DELAYED RELEASE ORAL at 16:21

## 2019-01-31 RX ADMIN — PANTOPRAZOLE SODIUM 40 MG: 40 TABLET, DELAYED RELEASE ORAL at 10:19

## 2019-01-31 RX ADMIN — FOLIC ACID 1 MG: 1 TABLET ORAL at 10:19

## 2019-01-31 RX ADMIN — ONDANSETRON 4 MG: 2 INJECTION INTRAMUSCULAR; INTRAVENOUS at 22:18

## 2019-01-31 RX ADMIN — DIPHENHYDRAMINE HYDROCHLORIDE 25 MG: 50 INJECTION, SOLUTION INTRAMUSCULAR; INTRAVENOUS at 22:18

## 2019-01-31 NOTE — PROGRESS NOTES
Problem: Falls - Risk of 
Goal: *Absence of Falls Document Alley Lui Fall Risk and appropriate interventions in the flowsheet. Outcome: Progressing Towards Goal 
Fall Risk Interventions: 
Mobility Interventions: Communicate number of staff needed for ambulation/transfer Mentation Interventions: Increase mobility Medication Interventions: Evaluate medications/consider consulting pharmacy, Teach patient to arise slowly History of Falls Interventions: Evaluate medications/consider consulting pharmacy

## 2019-01-31 NOTE — PROGRESS NOTES
Nutrition Assessment: 
 
INTERVENTIONS/RECOMMENDATIONS:  
Continue current diet ASSESSMENT:  
Chart reviewed. Pt continues to eat wells. No nutrition concerns. Will sign off. Please consult if nutrition needs arise. Diet Order: Regular 
% Eaten:   
Patient Vitals for the past 72 hrs: 
 % Diet Eaten 01/29/19 0859 100 % Pertinent Medications: [x]Reviewed: D5 1/2 NS, folic acid, zofran, PPI, miralax Pertinent Labs: [x]Reviewed:  
Food Allergies: [x]NKFA  []Other Last BM:  1/29 Edema:      []RUE   []LUE   [x]RLE 1+  [x]LLE 1+ Pressure Ulcer:  N/A    [] Stage I   [] Stage II   [] Stage III   [] Stage IV Anthropometrics: Height: 5' 10\" (177.8 cm) Weight: 79.8 kg (175 lb 14.8 oz) IBW (%IBW):   ( ) UBW (%UBW):   (  %) BMI: Body mass index is 25.24 kg/m². This BMI is indicative of: 
[]Underweight   []Normal   [x]Overweight   [] Obesity   [] Extreme Obesity (BMI>40) Last Weight Metrics: 
Weight Loss Metrics 1/19/2019 1/10/2019 12/12/2018 11/13/2018 10/29/2018 10/2/2018 9/24/2018 Today's Wt 175 lb 14.8 oz 182 lb 12.8 oz 169 lb 12.1 oz 197 lb 1.6 oz 170 lb 10.2 oz 201 lb 12.8 oz 190 lb 3.2 oz  
BMI 25.24 kg/m2 25.5 kg/m2 23.68 kg/m2 27.49 kg/m2 23.8 kg/m2 28.15 kg/m2 26.53 kg/m2 Estimated Nutrition Needs (Based on): 1900 Kcals/day(BMR: 1450 x 1.3) , 80 g(1 g/kg) Protein Carbohydrate: At Least 130 g/day  Fluids: 1900 mL/day or per primary team 
 
NUTRITION DIAGNOSES:  
Problem:  No nutritional diagnosis at this time Etiology: related to Signs/Symptoms: as evidenced by     
Previous Nutrition Dx:  [x] N/A  [] Unresolved           [] Progressing NUTRITION INTERVENTIONS: 
Meals/Snacks: General/healthful diet GOAL:  
consume >50% of meals in 5-7 days NUTRITION MONITORING AND EVALUATION Food/Nutrient Intake Outcomes: Total energy intake Physical Signs/Symptoms Outcomes: Weight/weight change, Electrolyte and renal profile, GI 
 
Previous Goal Met: 
 [x] Met              [] Progressing Towards Goal              [] Not Progressing Towards Goal  
Previous Recommendations: 
 [] Implemented          [] Not Implemented          [x] Not Applicable LEARNING NEEDS (Diet, Food/Nutrient-Drug Interaction):  
 [x] None Identified 
 [] Identified and Education Provided/Documented 
 [] Identified and Pt declined/was not appropriate Cultural, Zoroastrianism, OR Ethnic Dietary Needs:  
 [x] None Identified 
 [] Identified and Addressed 
 
 [x] Interdisciplinary Care Plan Reviewed/Documented  
 [x] Discharge Planning: General healthy diet 
 [] Participated in Interdisciplinary Rounds NUTRITION RISK:  
 [] High              [] Moderate           []  Low  [x]  Minimal/Uncompromised Christina Rausch RDN Pager [phone] Weekend Pager [phone]

## 2019-01-31 NOTE — PROGRESS NOTES
General Daily Progress Note Admit Date: 1/19/2019 Subjective:  
 
Patient feeling better - pain improved. Current Facility-Administered Medications Medication Dose Route Frequency  HYDROmorphone (PF) (DILAUDID) injection 1 mg  1 mg IntraVENous Q6H PRN  
 HYDROcodone-acetaminophen (NORCO)  mg tablet 1 Tab  1 Tab Oral Q6H PRN  
 fentaNYL (DURAGESIC) 75 mcg/hr patch 1 Patch  1 Patch TransDERmal Q72H  polyethylene glycol (MIRALAX) packet 17 g  17 g Oral DAILY  pantoprazole (PROTONIX) tablet 40 mg  40 mg Oral ACB&D  
 enoxaparin (LOVENOX) injection 40 mg  40 mg SubCUTAneous Q24H  
 dextrose 5% - 0.45% NaCl with KCl 10 mEq/L infusion  50 mL/hr IntraVENous CONTINUOUS  
 citalopram (CELEXA) tablet 10 mg  10 mg Oral DAILY  folic acid (FOLVITE) tablet 1 mg  1 mg Oral DAILY  nitroglycerin (NITROBID) 2 % ointment 2 Inch  2 Inch Topical Q6H PRN  
 diphenhydrAMINE (BENADRYL) injection 25 mg  25 mg IntraVENous Q4H PRN  
 sodium chloride (NS) flush 5-40 mL  5-40 mL IntraVENous Q8H  
 sodium chloride (NS) flush 5-40 mL  5-40 mL IntraVENous PRN  
 naloxone (NARCAN) injection 0.4 mg  0.4 mg IntraVENous PRN  
 ondansetron (ZOFRAN) injection 4 mg  4 mg IntraVENous Q4H PRN  
 bisacodyl (DULCOLAX) suppository 10 mg  10 mg Rectal DAILY PRN Review of Systems A comprehensive review of systems was negative. Objective:  
 
Patient Vitals for the past 24 hrs: 
 BP Temp Pulse Resp SpO2  
01/30/19 2318 126/68 99.1 °F (37.3 °C) 82 16 99 % 01/30/19 1927 129/78 98.8 °F (37.1 °C) 92 16 100 % 01/30/19 1535 136/80 98.9 °F (37.2 °C) 89 16 100 % 01/30/19 0749 127/78 98.4 °F (36.9 °C) 85 16 100 % No intake/output data recorded. 01/29 1901 - 01/31 0700 In: 3345.4 [I.V.:3345.4] Out: - Physical Exam:  
Visit Vitals /68 (BP 1 Location: Right arm, BP Patient Position: At rest) Pulse 82 Temp 99.1 °F (37.3 °C) Resp 16 Ht 5' 10\" (1.778 m) Wt 175 lb 14.8 oz (79.8 kg) SpO2 99% BMI 25.24 kg/m² General appearance: alert, cooperative, no distress, appears stated age Neck: supple, symmetrical, trachea midline, no adenopathy, thyroid: not enlarged, symmetric, no tenderness/mass/nodules, no carotid bruit and no JVD Lungs: clear to auscultation bilaterally Heart: regular rate and rhythm, S1, S2 normal, no murmur, click, rub or gallop Abdomen: soft, non-tender. Bowel sounds normal. No masses,  no organomegaly Extremities: extremities normal, atraumatic, no cyanosis or edema Data Review No results found for this or any previous visit (from the past 24 hour(s)). Assessment:  
 
Principal Problem: 
  Sickle cell pain crisis ([facility] (9/8/2018) Active Problems: 
  Sickle cell anemia ([facility] (10/3/2014) Hypertension (10/3/2014) Depression (10/5/2014) Plan: 1. Continue to transition to p.o. Analgesics. She is gradually improving. 2. Home tomorrow if stable

## 2019-02-01 VITALS
RESPIRATION RATE: 18 BRPM | HEART RATE: 72 BPM | TEMPERATURE: 98.5 F | SYSTOLIC BLOOD PRESSURE: 112 MMHG | HEIGHT: 70 IN | BODY MASS INDEX: 25.19 KG/M2 | DIASTOLIC BLOOD PRESSURE: 58 MMHG | OXYGEN SATURATION: 95 % | WEIGHT: 175.93 LBS

## 2019-02-01 PROCEDURE — 94760 N-INVAS EAR/PLS OXIMETRY 1: CPT

## 2019-02-01 PROCEDURE — 74011250636 HC RX REV CODE- 250/636: Performed by: INTERNAL MEDICINE

## 2019-02-01 PROCEDURE — 77010033678 HC OXYGEN DAILY

## 2019-02-01 PROCEDURE — 74011250637 HC RX REV CODE- 250/637: Performed by: INTERNAL MEDICINE

## 2019-02-01 RX ADMIN — POLYETHYLENE GLYCOL 3350 17 G: 17 POWDER, FOR SOLUTION ORAL at 08:05

## 2019-02-01 RX ADMIN — Medication 10 ML: at 04:19

## 2019-02-01 RX ADMIN — ENOXAPARIN SODIUM 40 MG: 40 INJECTION SUBCUTANEOUS at 10:26

## 2019-02-01 RX ADMIN — HYDROMORPHONE HYDROCHLORIDE 1 MG: 2 INJECTION, SOLUTION INTRAMUSCULAR; INTRAVENOUS; SUBCUTANEOUS at 04:18

## 2019-02-01 RX ADMIN — ONDANSETRON 4 MG: 2 INJECTION INTRAMUSCULAR; INTRAVENOUS at 04:18

## 2019-02-01 RX ADMIN — Medication 10 ML: at 12:21

## 2019-02-01 RX ADMIN — CITALOPRAM HYDROBROMIDE 10 MG: 20 TABLET ORAL at 08:05

## 2019-02-01 RX ADMIN — DIPHENHYDRAMINE HYDROCHLORIDE 25 MG: 50 INJECTION, SOLUTION INTRAMUSCULAR; INTRAVENOUS at 04:18

## 2019-02-01 RX ADMIN — HYDROCODONE BITARTRATE AND ACETAMINOPHEN 1 TABLET: 10; 325 TABLET ORAL at 10:27

## 2019-02-01 RX ADMIN — PANTOPRAZOLE SODIUM 40 MG: 40 TABLET, DELAYED RELEASE ORAL at 08:05

## 2019-02-01 RX ADMIN — ONDANSETRON 4 MG: 2 INJECTION INTRAMUSCULAR; INTRAVENOUS at 10:27

## 2019-02-01 RX ADMIN — FOLIC ACID 1 MG: 1 TABLET ORAL at 08:05

## 2019-02-01 RX ADMIN — DIPHENHYDRAMINE HYDROCHLORIDE 25 MG: 50 INJECTION, SOLUTION INTRAMUSCULAR; INTRAVENOUS at 10:29

## 2019-02-01 NOTE — PROGRESS NOTES
PCP KRISTINA appt scheduled with  on 2/7/2019 at 1:15pm. Appt added to AVS. LOGAN Winston CM Specialist

## 2019-02-01 NOTE — DISCHARGE SUMMARY
Ady Rodgers  MR#: [id number]  : 1960  ACCOUNT #: [de-identified]   ADMIT DATE: 2019  DISCHARGE DATE: 2019    HISTORY OF PRESENT ILLNESS:  A 62-year-old black female with known history of sickle cell disease, using fentanyl patch, has not been using for the past few weeks. Baseline is always some pain. Using hydrocodone as needed. For the past 3 days, she was aching all over. Increasing pain, bilateral knees groin, elbow typical of her sickle cell pain crisis. Pain as 10/10 and not controlled with home medications. Came to the ED where she was seen by the hospitalist and admitted for further evaluation and care. PAST MEDICAL HISTORY, REVIEW OF SYSTEMS, PHYSICAL EXAMINATION:  Noted on the HPI. LABORATORY DATA:  Results revealed hemoglobin at 6.9, hematocrit 20.8. WBC 8.7. Urinalysis unremarkable. Sodium 136, potassium 3.9, chloride 99, CO2 33, glucose 93, BUN 9, creatinine 0.75. IMAGING STUDIES:  Chest x-ray, no acute finding. HOSPITAL COURSE:  The patient as stated above. She was placed on Dilaudid IV for the sickle cell crisis pain and IV fluids. For the pruritus associated with the Benadryl, she was given Benadryl. Over the course of hospital stay, the pain gradually improved. Her hemoglobin dropped to 6.9 at the time of discharge. She did not want to have a transfusion. At this point, her symptoms have been controlled now with p.o. medications and with improvement, control the pain with oral topical medications, she is being discharged home ambulatory, satisfactory condition, improved. Because of the low hemoglobin, we asked her back quickly to see Dr. Keith Alexandre, discharge debility. FINAL DIAGNOSES:  1.  Painful sickle cell crisis. 2.  Sickle cell hemoglobinopathy. 3.  Hypertension. 4.  Depression. 5.  Status post cholecystectomy. DISPOSITION:  Discharged home to ambulatory.     DISCHARGE MEDICATIONS:  Include Celexa 10 mg daily, hydrocortisone topical cream, diphenhydramine 50 mg p.r.n., fentanyl 75 mcg per hour patch H.63 hours,  folic acid 1 mg daily, Norco 10/325 q.4 hours p.r.n., losartan/hydrochlorothiazide 50/12.5 daily, omeprazole 20 mg daily and Zofran 4 mg q.8 hours p.r.n. Heart healthy diet, up as tolerated.       MD ISABELLA Barkley/DN  D: 02/01/2019 06:58     T: 02/01/2019 07:58  JOB #: [id number]

## 2019-02-01 NOTE — PROGRESS NOTES
Bedside shift change report given to Louisa (oncoming nurse) by Silvia Koroma (offgoing nurse). Report included the following information SBAR, Kardex, MAR and Recent Results.

## 2019-02-01 NOTE — DISCHARGE INSTRUCTIONS
Patient Discharge Instructions    Osiel Montes / [id number] : 1960    Admitted 2019 Discharged: 2019     Patient Active Problem List   Diagnosis Code    Sickle cell anemia ([facility] D57.1    Hypertension I10    Venous insufficiency I87.2    Hepatitis C B19.20    Depression F32.9    Furuncle of axilla L02.429    Sickle cell crisis (HCC) D57.00    Pulsatile tinnitus H93. A9    Carpal tunnel syndrome of right wrist G56.01    [facility] D57.[facility]       Take Home Medications        · It is important that you take the medication exactly as they are prescribed. · Keep your medication in the bottles provided by the pharmacist and keep a list of the medication names, dosages, and times to be taken in your wallet. · Do not take other medications without consulting your doctor. What to do at Home    Recommended diet: Cardiac Diet,   Recommended activity: Activity as tolerated,     Follow-up with KAREL SOUTH MD in 3 day        Information obtained by :  I understand that if any problems occur once I am at home I am to contact my physician. I understand and acknowledge receipt of the instructions indicated above.                                                                                                                                            Physician's or R.N.'s Signature                                                                  Date/Time                                                                                                                                              Patient or Representative Signature                                                          Date/Time

## 2019-02-01 NOTE — PROGRESS NOTES
General Daily Progress Note Admit Date: 1/19/2019 Subjective:  
 
Patient feeling better - pain improved. Current Facility-Administered Medications Medication Dose Route Frequency  HYDROmorphone (PF) (DILAUDID) injection 1 mg  1 mg IntraVENous Q6H PRN  
 HYDROcodone-acetaminophen (NORCO)  mg tablet 1 Tab  1 Tab Oral Q6H PRN  
 fentaNYL (DURAGESIC) 75 mcg/hr patch 1 Patch  1 Patch TransDERmal Q72H  polyethylene glycol (MIRALAX) packet 17 g  17 g Oral DAILY  pantoprazole (PROTONIX) tablet 40 mg  40 mg Oral ACB&D  
 enoxaparin (LOVENOX) injection 40 mg  40 mg SubCUTAneous Q24H  
 dextrose 5% - 0.45% NaCl with KCl 10 mEq/L infusion  50 mL/hr IntraVENous CONTINUOUS  
 citalopram (CELEXA) tablet 10 mg  10 mg Oral DAILY  folic acid (FOLVITE) tablet 1 mg  1 mg Oral DAILY  nitroglycerin (NITROBID) 2 % ointment 2 Inch  2 Inch Topical Q6H PRN  
 diphenhydrAMINE (BENADRYL) injection 25 mg  25 mg IntraVENous Q4H PRN  
 sodium chloride (NS) flush 5-40 mL  5-40 mL IntraVENous Q8H  
 sodium chloride (NS) flush 5-40 mL  5-40 mL IntraVENous PRN  
 naloxone (NARCAN) injection 0.4 mg  0.4 mg IntraVENous PRN  
 ondansetron (ZOFRAN) injection 4 mg  4 mg IntraVENous Q4H PRN  
 bisacodyl (DULCOLAX) suppository 10 mg  10 mg Rectal DAILY PRN Review of Systems A comprehensive review of systems was negative. Objective:  
 
Patient Vitals for the past 24 hrs: 
 BP Temp Pulse Resp SpO2  
01/31/19 2311 129/75 99.1 °F (37.3 °C) 85 16 99 % 01/31/19 2004 131/77 99.9 °F (37.7 °C) 86 16 100 % 01/31/19 1602 129/80 99.3 °F (37.4 °C) 88 14 100 % 01/31/19 0800 130/67 99.1 °F (37.3 °C) 85 18 99 % 01/31 1901 - 02/01 0700 In: 1221.7 [I.V.:1221.7] Out: -  
01/30 0701 - 01/31 1900 In: 1296.3 [I.V.:1296.3] Out: - Physical Exam:  
Visit Vitals /75 (BP 1 Location: Right arm, BP Patient Position: Sitting) Pulse 85 Temp 99.1 °F (37.3 °C) Resp 16 Ht 5' 10\" (1.778 m) Wt 175 lb 14.8 oz (79.8 kg) SpO2 99% BMI 25.24 kg/m² General appearance: alert, cooperative, no distress, appears stated age Neck: supple, symmetrical, trachea midline, no adenopathy, thyroid: not enlarged, symmetric, no tenderness/mass/nodules, no carotid bruit and no JVD Lungs: clear to auscultation bilaterally Heart: regular rate and rhythm, S1, S2 normal, no murmur, click, rub or gallop Abdomen: soft, non-tender. Bowel sounds normal. No masses,  no organomegaly Extremities: extremities normal, atraumatic, no cyanosis or edema Data Review No results found for this or any previous visit (from the past 24 hour(s)). Assessment:  
 
Principal Problem: 
  Sickle cell pain crisis ([facility] (9/8/2018) Active Problems: 
  Sickle cell anemia ([facility] (10/3/2014) Hypertension (10/3/2014) Depression (10/5/2014) Plan: 1. Continue to transition to p.o. Analgesics. She is gradually improving. 2. Home today

## 2019-02-01 NOTE — PROGRESS NOTES
Patient educated on discharge instructions, prescriptions, and follow up appointments and verbalized understanding of all discharge instructions. Given opportunity for questions. Midline IV removed before discharge.

## 2019-02-04 ENCOUNTER — PATIENT OUTREACH (OUTPATIENT)
Dept: INTERNAL MEDICINE CLINIC | Age: 59
End: 2019-02-04

## 2019-02-07 ENCOUNTER — OFFICE VISIT (OUTPATIENT)
Dept: INTERNAL MEDICINE CLINIC | Age: 59
End: 2019-02-07

## 2019-02-07 ENCOUNTER — PATIENT OUTREACH (OUTPATIENT)
Dept: INTERNAL MEDICINE CLINIC | Age: 59
End: 2019-02-07

## 2019-02-07 VITALS
DIASTOLIC BLOOD PRESSURE: 82 MMHG | WEIGHT: 181.3 LBS | RESPIRATION RATE: 16 BRPM | SYSTOLIC BLOOD PRESSURE: 119 MMHG | HEART RATE: 92 BPM | OXYGEN SATURATION: 98 % | HEIGHT: 70 IN | BODY MASS INDEX: 25.96 KG/M2 | TEMPERATURE: 99.8 F

## 2019-02-07 DIAGNOSIS — F32.A DEPRESSION, UNSPECIFIED DEPRESSION TYPE: ICD-10-CM

## 2019-02-07 DIAGNOSIS — E66.3 OVERWEIGHT (BMI 25.0-29.9): ICD-10-CM

## 2019-02-07 DIAGNOSIS — I10 ESSENTIAL HYPERTENSION: ICD-10-CM

## 2019-02-07 DIAGNOSIS — D57.00 SICKLE CELL PAIN CRISIS (HCC): Primary | ICD-10-CM

## 2019-02-07 RX ORDER — HYDROCODONE BITARTRATE AND ACETAMINOPHEN 10; 325 MG/1; MG/1
1 TABLET ORAL
Qty: 120 TAB | Refills: 0 | Status: SHIPPED | OUTPATIENT
Start: 2019-02-07 | End: 2019-03-26 | Stop reason: SDUPTHER

## 2019-02-07 NOTE — PROGRESS NOTES
[facility] 1/19-2/1/2019:  Sickle Cell Crisis Patient noted listed on Office Visit Schedule given 2/4/2019. Patient on weekend [facility] discharge report dated 2/4/2019. Undersigned left message on voice mail with my contact information. Need to assess for discharge needs.

## 2019-02-07 NOTE — PROGRESS NOTES
NNT[facility]IP [facility] -2019:  Sickle Cell Pain Crisis Hospital Discharge Follow-Up Date/Time:  2019 10:16 AM 
 
Patient was admitted to [facility] on  and discharged on 2019 for Sickle Cell Pain Crisis. The physician discharge summary was available at the time of outreach. Patient was contacted within 2 business days of discharge. Top Challenges reviewed with the provider Does not have Fentanyl Patch due to insurance non-payment. States is should be clear now-agreed to call pharmacy today. Method of communication with provider :Note message. Inpatient RRAT score: 22 Was this a readmission? yes Patient stated reason for the readmission: severe joint pain. Hgb 6.9 Nurse Navigator (NN) contacted the patient by telephone to perform post hospital discharge assessment. Verified name and  with patient as identifiers. Provided introduction to self, and explanation of the Nurse Navigator role. Reviewed discharge instructions and red flags with patient who verbalized understanding. Patient given an opportunity to ask questions and does not have any further questions or concerns at this time. The patient agrees to contact the PCP office for questions related to their healthcare. NN provided contact information for future reference. Disease Specific:  None FINAL DIAGNOSES: 
1.  Painful sickle crises. 2.  SC hemoglobinopathy. 3.  Primary hypertension. 4.  History of depression. 5.  Status post cholecystectomy. Summary of patient's top problems: 1. Hematology-sickle cell pain. 2. Depression Home Health orders at discharge: none Home Health company: none Date of initial visit: n/a Durable Medical Equipment ordered/company: none Durable Medical Equipment received: n/a Barriers to care? Medication Management only to an extent;  Hgb 6.9 Advance Care Planning: Does patient have an Advance Directive:  not on file; education provided Medication(s):  
New Medications at Discharge: none Changed Medications at Discharge: none Discontinued Medications at Discharge: none Medication reconciliation was performed with patient, who verbalizes understanding of administration of home medications. There were barriers to obtaining medications identified at this time. Referral to Pharm D needed: no  
 
Current Outpatient Medications Medication Sig  diphenhydrAMINE (BENADRYL) 25 mg tablet Take 50 mg by mouth daily as needed (for itching with hydrocodone).  hydrocortisone (CORTISONE) 1 % topical cream Apply  to affected area two (2) times daily as needed for Skin Irritation. use thin layer  folic acid (FOLVITE) 1 mg tablet Take 1 Tab by mouth daily.  citalopram (CELEXA) 10 mg tablet TAKE 1 TABLET BY MOUTH EVERY NIGHT AT BEDTIME  losartan-hydroCHLOROthiazide (HYZAAR) 50-12.5 mg per tablet TAKE 1 TABLET BY MOUTH DAILY  HYDROcodone-acetaminophen (NORCO)  mg tablet Take 1 Tab by mouth every six (6) hours as needed. Max Daily Amount: 4 Tabs.  ondansetron (ZOFRAN ODT) 4 mg disintegrating tablet Take 1 Tab by mouth every eight (8) hours as needed for Nausea.  OMEPRAZOLE PO Take 10 mg by mouth daily.  fentaNYL (DURAGESIC) 75 mcg/hr 1 Patch by TransDERmal route every seventy-two (72) hours. Max Daily Amount: 1 Patch. No current facility-administered medications for this visit. There are no discontinued medications. [facility] follow up appointment(s):  
Future Appointments Date Time Provider Waldo Calvo 2/7/2019  1:15 PM Pepe Diehl MD [address]  
4/18/2019  1:00 PM Pepe Diehl MD [address] [place] [address] Non-[facility] follow up appointment(s): none [facility]:  n/a  
 
Goals  Coordinate pain management plan for patient. 9/24/2018:  Patient follows plan for frequent joint stiffness/pain.   In addition to pain medication, exercise as much as joints allow is done. Warm soaks to joint also a recommendation when conditions that may cause S&S might be coming. EW    
 
1/10/2019: Coordinate pain management and to help patient to avoid stiffness before a sickle cell anemia crisis. 2/7/2019:  Patient will take Fentanyl Patch as ordered. Patient c/o insurance lapse due to non-payment during last hospitalization; states it has been taken care of now; agreed to contact pharmacy today for insurance status and med pick. NN w/ f/u next call. EW  
  
  
 Goal completion:  2/7/2019

## 2019-02-07 NOTE — PROGRESS NOTES
Chief Complaint Patient presents with  Transitions Of Care Patient admitted to [facility] on 1/1/9/19 for Julianna Lynne. 1. Have you been to the ER, urgent care clinic since your last visit? Hospitalized since your last visit? Yes When: 1/19/19 Where: [facility] Reason for visit: Sickle Cell Crisis 2. Have you seen or consulted any other health care providers outside of the [address] [place] [address] since your last visit? Include any pap smears or colon screening.  No 
 
 Breath Sounds equal & clear to percussion & auscultation, no accessory muscle use

## 2019-02-08 NOTE — PROGRESS NOTES
[address] [place] [address] and Primary Care 
[place] [address] 
[address] [place] [address] Phone:  [phone]  Fax: [phone] Chief Complaint Patient presents with  Transitions Of Care Patient admitted to [facility] hospital on 1/1/9/19 for Julianna Moran .   
 
SUBJECTIVE: 
  Adrian Foster is a 66 y.o. female Comes in for return visit stating that she is much better. She has occasional pain requiring analgesics, but she is quite stable. She was most recently discharged after being in the hospital for two weeks for painful crisis. Her duration of hospitalization is increasing. She generally refuses to have blood and does not want Hydroxyurea. She admits to an element of depression given the fact that her daughter most recently  and will be moving out of the house. She takes her antihypertensive medication as prescribed. Most recently she also lost weight and I congratulate her on this. She was indeed overweight previously and remains overweight, but much less than before. Current Outpatient Medications Medication Sig Dispense Refill  
 HYDROcodone-acetaminophen (NORCO)  mg tablet Take 1 Tab by mouth every six (6) hours as needed. Max Daily Amount: 4 Tabs. 120 Tab 0  
 diphenhydrAMINE (BENADRYL) 25 mg tablet Take 50 mg by mouth daily as needed (for itching with hydrocodone).  hydrocortisone (CORTISONE) 1 % topical cream Apply  to affected area two (2) times daily as needed for Skin Irritation. use thin layer  folic acid (FOLVITE) 1 mg tablet Take 1 Tab by mouth daily. 30 Tab 11  
 citalopram (CELEXA) 10 mg tablet TAKE 1 TABLET BY MOUTH EVERY NIGHT AT BEDTIME 90 Tab 3  
 losartan-hydroCHLOROthiazide (HYZAAR) 50-12.5 mg per tablet TAKE 1 TABLET BY MOUTH DAILY 90 Tab 3  
 fentaNYL (DURAGESIC) 75 mcg/hr 1 Patch by TransDERmal route every seventy-two (72) hours. Max Daily Amount: 1 Patch.  10 Patch 0  
  ondansetron (ZOFRAN ODT) 4 mg disintegrating tablet Take 1 Tab by mouth every eight (8) hours as needed for Nausea. 10 Tab 0  
 OMEPRAZOLE PO Take 10 mg by mouth daily. Past Medical History:  
Diagnosis Date  Anemia SC hemoglobinopathy  Chronic pain  Depression  Hypertension  Liver disease   
 hepatitis C; pt reports \"cured\"  Sickle cell disease ([facility] Past Surgical History:  
Procedure Laterality Date  BREAST SURGERY PROCEDURE UNLISTED 2 cysts removed from R breast  
 CHEST SURGERY PROCEDURE UNLISTED    
 status post thor for removal of a benign  HX BREAST BIOPSY Right 05/2007  
 negative  HX CHOLECYSTECTOMY  HX ORTHOPAEDIC    
 bony curettage of an ostial myelitis focus Allergies Allergen Reactions  Dilaudid [Hydromorphone (Bulk)] Itching Can tolerate with benadryl and ondansetron  Percocet [Oxycodone-Acetaminophen] Itching REVIEW OF SYSTEMS: 
General: negative for - chills or fever ENT: negative for - headaches, nasal congestion or tinnitus Respiratory: negative for - cough, hemoptysis, shortness of breath or wheezing Cardiovascular : negative for - chest pain, edema, palpitations or shortness of breath Gastrointestinal: negative for - abdominal pain, blood in stools, heartburn or nausea/vomiting Genito-Urinary: no dysuria, trouble voiding, or hematuria Musculoskeletal: negative for - gait disturbance, joint pain, joint stiffness or joint swelling Neurological: no TIA or stroke symptoms Hematologic: no bruises, no bleeding, no swollen glands Integument: no lumps, mole changes, nail changes or rash Endocrine: no malaise/lethargy or unexpected weight changes Social History Socioeconomic History  Marital status:  Spouse name: Not on file  Number of children: 2  
 Years of education: Not on file  Highest education level: Not on file Occupational History  Occupation: disabled---Sickle cell disease Tobacco Use  Smoking status: Never Smoker  Smokeless tobacco: Never Used Substance and Sexual Activity  Alcohol use: No  
 Drug use: No  
 Sexual activity: Yes  
  Partners: Male Family History Problem Relation Age of Onset  Hypertension Mother  Hypertension Father OBJECTIVE: 
 
Visit Vitals /82 Pulse 92 Temp 99.8 °F (37.7 °C) (Oral) Resp 16 Ht 5' 10\" (1.778 m) Wt 181 lb 4.8 oz (82.2 kg) SpO2 98% BMI 26.01 kg/m² CONSTITUTIONAL: well , well nourished, appears age appropriate EYES: perrla, eom intact ENMT:moist mucous membranes, pharynx clear NECK: supple. Thyroid normal 
RESPIRATORY: Chest: clear to ascultation and percussion CARDIOVASCULAR: Heart: regular rate and rhythm GASTROINTESTINAL: Abdomen: soft, bowel sounds active HEMATOLOGIC: no pathological lymph nodes palpated MUSCULOSKELETAL: Extremities: no edema, pulse 1+ INTEGUMENT: No unusual rashes or suspicious skin lesions noted. Nails appear normal. 
NEUROLOGIC: non-focal exam  
MENTAL STATUS: alert and oriented, appropriate affect ASSESSMENT: 
1. Sickle cell pain crisis ([facility] 2. Essential hypertension 3. Depression, unspecified depression type 4. Overweight (BMI 25.0-29. 9) PLAN: 
 
1. Her sickle cell disease appears to be reasonably stable. She will continue chronic use of her analgesics. 2. Her blood pressure is excellent today, no adjustments are made. 3. Her depression is improving. She does remain on her antidepressant however. 4. I congratulate her on her weight loss. This needs to continue, which can be done by a reduction in her consumption of processed carbohydrates. . 
Orders Placed This Encounter  HYDROcodone-acetaminophen (NORCO)  mg tablet Follow-up Disposition: 
Return in about 3 months (around 5/7/2019).  
 
 
Shahla Altman MD

## 2019-02-10 ENCOUNTER — APPOINTMENT (OUTPATIENT)
Dept: GENERAL RADIOLOGY | Age: 59
DRG: 812 | End: 2019-02-10
Attending: PHYSICIAN ASSISTANT
Payer: MEDICARE

## 2019-02-10 ENCOUNTER — HOSPITAL ENCOUNTER (INPATIENT)
Age: 59
LOS: 20 days | Discharge: HOME | DRG: 812 | End: 2019-03-02
Attending: EMERGENCY MEDICINE | Admitting: INTERNAL MEDICINE
Payer: MEDICARE

## 2019-02-10 DIAGNOSIS — D57.00 SICKLE CELL PAIN CRISIS (HCC): Primary | ICD-10-CM

## 2019-02-10 PROBLEM — D57.1 SICKLE CELL DISEASE (HCC): Status: ACTIVE | Noted: 2019-02-10

## 2019-02-10 LAB
ALBUMIN SERPL-MCNC: 4.1 G/DL (ref 3.5–5)
ALBUMIN/GLOB SERPL: 0.7 {RATIO} (ref 1.1–2.2)
ALP SERPL-CCNC: 119 U/L (ref 45–117)
ALT SERPL-CCNC: 23 U/L (ref 12–78)
ANION GAP SERPL CALC-SCNC: 8 MMOL/L (ref 5–15)
AST SERPL-CCNC: 43 U/L (ref 15–37)
BASOPHILS # BLD: 0 K/UL (ref 0–0.1)
BASOPHILS NFR BLD: 1 % (ref 0–1)
BILIRUB SERPL-MCNC: 1.5 MG/DL (ref 0.2–1)
BUN SERPL-MCNC: 9 MG/DL (ref 6–20)
BUN/CREAT SERPL: 9 (ref 12–20)
CALCIUM SERPL-MCNC: 8.7 MG/DL (ref 8.5–10.1)
CHLORIDE SERPL-SCNC: 105 MMOL/L (ref 97–108)
CK MB CFR SERPL CALC: NORMAL % (ref 0–2.5)
CK MB SERPL-MCNC: <1 NG/ML (ref 5–25)
CK SERPL-CCNC: 66 U/L (ref 26–192)
CO2 SERPL-SCNC: 24 MMOL/L (ref 21–32)
CREAT SERPL-MCNC: 0.97 MG/DL (ref 0.55–1.02)
DIFFERENTIAL METHOD BLD: ABNORMAL
EOSINOPHIL # BLD: 0.1 K/UL (ref 0–0.4)
EOSINOPHIL NFR BLD: 2 % (ref 0–7)
ERYTHROCYTE [DISTWIDTH] IN BLOOD BY AUTOMATED COUNT: 18.3 % (ref 11.5–14.5)
FLUAV AG NPH QL IA: NEGATIVE
FLUBV AG NOSE QL IA: NEGATIVE
GLOBULIN SER CALC-MCNC: 5.9 G/DL (ref 2–4)
GLUCOSE SERPL-MCNC: 93 MG/DL (ref 65–100)
HCT VFR BLD AUTO: 28.8 % (ref 35–47)
HGB BLD-MCNC: 9.8 G/DL (ref 11.5–16)
IMM GRANULOCYTES # BLD AUTO: 0 K/UL (ref 0–0.04)
IMM GRANULOCYTES NFR BLD AUTO: 0 % (ref 0–0.5)
LYMPHOCYTES # BLD: 2.2 K/UL (ref 0.8–3.5)
LYMPHOCYTES NFR BLD: 44 % (ref 12–49)
MCH RBC QN AUTO: 31 PG (ref 26–34)
MCHC RBC AUTO-ENTMCNC: 34 G/DL (ref 30–36.5)
MCV RBC AUTO: 91.1 FL (ref 80–99)
MONOCYTES # BLD: 0.6 K/UL (ref 0–1)
MONOCYTES NFR BLD: 12 % (ref 5–13)
NEUTS SEG # BLD: 2.1 K/UL (ref 1.8–8)
NEUTS SEG NFR BLD: 41 % (ref 32–75)
NRBC # BLD: 0 K/UL (ref 0–0.01)
NRBC BLD-RTO: 0 PER 100 WBC
PLATELET # BLD AUTO: 432 K/UL (ref 150–400)
PMV BLD AUTO: 11.1 FL (ref 8.9–12.9)
POTASSIUM SERPL-SCNC: 3 MMOL/L (ref 3.5–5.1)
PROT SERPL-MCNC: 10 G/DL (ref 6.4–8.2)
RBC # BLD AUTO: 3.16 M/UL (ref 3.8–5.2)
RETICS # AUTO: 0.04 M/UL (ref 0.02–0.08)
RETICS/RBC NFR AUTO: 1.3 % (ref 0.7–2.1)
SODIUM SERPL-SCNC: 137 MMOL/L (ref 136–145)
TROPONIN I SERPL-MCNC: <0.05 NG/ML
WBC # BLD AUTO: 5.1 K/UL (ref 3.6–11)

## 2019-02-10 PROCEDURE — 93005 ELECTROCARDIOGRAM TRACING: CPT

## 2019-02-10 PROCEDURE — 74011250636 HC RX REV CODE- 250/636: Performed by: PHYSICIAN ASSISTANT

## 2019-02-10 PROCEDURE — 99284 EMERGENCY DEPT VISIT MOD MDM: CPT

## 2019-02-10 PROCEDURE — 82553 CREATINE MB FRACTION: CPT

## 2019-02-10 PROCEDURE — 71046 X-RAY EXAM CHEST 2 VIEWS: CPT

## 2019-02-10 PROCEDURE — 85025 COMPLETE CBC W/AUTO DIFF WBC: CPT

## 2019-02-10 PROCEDURE — 96374 THER/PROPH/DIAG INJ IV PUSH: CPT

## 2019-02-10 PROCEDURE — 65270000029 HC RM PRIVATE

## 2019-02-10 PROCEDURE — 74011000258 HC RX REV CODE- 258: Performed by: INTERNAL MEDICINE

## 2019-02-10 PROCEDURE — 87804 INFLUENZA ASSAY W/OPTIC: CPT

## 2019-02-10 PROCEDURE — 96361 HYDRATE IV INFUSION ADD-ON: CPT

## 2019-02-10 PROCEDURE — 36415 COLL VENOUS BLD VENIPUNCTURE: CPT

## 2019-02-10 PROCEDURE — 84484 ASSAY OF TROPONIN QUANT: CPT

## 2019-02-10 PROCEDURE — 82550 ASSAY OF CK (CPK): CPT

## 2019-02-10 PROCEDURE — 74011250637 HC RX REV CODE- 250/637: Performed by: INTERNAL MEDICINE

## 2019-02-10 PROCEDURE — 80053 COMPREHEN METABOLIC PANEL: CPT

## 2019-02-10 PROCEDURE — 96376 TX/PRO/DX INJ SAME DRUG ADON: CPT

## 2019-02-10 PROCEDURE — 85045 AUTOMATED RETICULOCYTE COUNT: CPT

## 2019-02-10 PROCEDURE — 74011250636 HC RX REV CODE- 250/636: Performed by: EMERGENCY MEDICINE

## 2019-02-10 PROCEDURE — 74011250636 HC RX REV CODE- 250/636: Performed by: INTERNAL MEDICINE

## 2019-02-10 PROCEDURE — 96375 TX/PRO/DX INJ NEW DRUG ADDON: CPT

## 2019-02-10 RX ORDER — CITALOPRAM 20 MG/1
10 TABLET, FILM COATED ORAL DAILY
Status: DISCONTINUED | OUTPATIENT
Start: 2019-02-11 | End: 2019-03-02 | Stop reason: HOSPADM

## 2019-02-10 RX ORDER — ONDANSETRON 2 MG/ML
2 INJECTION INTRAMUSCULAR; INTRAVENOUS
Status: DISCONTINUED | OUTPATIENT
Start: 2019-02-10 | End: 2019-03-02 | Stop reason: HOSPADM

## 2019-02-10 RX ORDER — HYDROMORPHONE HYDROCHLORIDE 1 MG/ML
1 INJECTION, SOLUTION INTRAMUSCULAR; INTRAVENOUS; SUBCUTANEOUS
Status: DISCONTINUED | OUTPATIENT
Start: 2019-02-10 | End: 2019-02-13

## 2019-02-10 RX ORDER — HEPARIN SODIUM 5000 [USP'U]/ML
5000 INJECTION, SOLUTION INTRAVENOUS; SUBCUTANEOUS EVERY 8 HOURS
Status: DISCONTINUED | OUTPATIENT
Start: 2019-02-10 | End: 2019-03-02 | Stop reason: HOSPADM

## 2019-02-10 RX ORDER — NICOTINE 7MG/24HR
1 PATCH, TRANSDERMAL 24 HOURS TRANSDERMAL
Status: DISCONTINUED | OUTPATIENT
Start: 2019-02-10 | End: 2019-03-02 | Stop reason: HOSPADM

## 2019-02-10 RX ORDER — SODIUM CHLORIDE 0.9 % (FLUSH) 0.9 %
5-40 SYRINGE (ML) INJECTION AS NEEDED
Status: DISCONTINUED | OUTPATIENT
Start: 2019-02-10 | End: 2019-03-02 | Stop reason: HOSPADM

## 2019-02-10 RX ORDER — HYDROMORPHONE HYDROCHLORIDE 1 MG/ML
1 INJECTION, SOLUTION INTRAMUSCULAR; INTRAVENOUS; SUBCUTANEOUS
Status: COMPLETED | OUTPATIENT
Start: 2019-02-10 | End: 2019-02-10

## 2019-02-10 RX ORDER — DIPHENHYDRAMINE HYDROCHLORIDE 50 MG/ML
25 INJECTION, SOLUTION INTRAMUSCULAR; INTRAVENOUS
Status: COMPLETED | OUTPATIENT
Start: 2019-02-10 | End: 2019-02-10

## 2019-02-10 RX ORDER — FENTANYL 75 UG/H
1 PATCH TRANSDERMAL
Status: DISCONTINUED | OUTPATIENT
Start: 2019-02-10 | End: 2019-03-02 | Stop reason: HOSPADM

## 2019-02-10 RX ORDER — MORPHINE SULFATE 10 MG/ML
6 INJECTION, SOLUTION INTRAMUSCULAR; INTRAVENOUS
Status: DISCONTINUED | OUTPATIENT
Start: 2019-02-10 | End: 2019-02-10

## 2019-02-10 RX ORDER — NALOXONE HYDROCHLORIDE 0.4 MG/ML
0.4 INJECTION, SOLUTION INTRAMUSCULAR; INTRAVENOUS; SUBCUTANEOUS AS NEEDED
Status: DISCONTINUED | OUTPATIENT
Start: 2019-02-10 | End: 2019-03-02 | Stop reason: HOSPADM

## 2019-02-10 RX ORDER — DEXTROSE MONOHYDRATE AND SODIUM CHLORIDE 5; .45 G/100ML; G/100ML
100 INJECTION, SOLUTION INTRAVENOUS CONTINUOUS
Status: DISCONTINUED | OUTPATIENT
Start: 2019-02-10 | End: 2019-02-11

## 2019-02-10 RX ORDER — PANTOPRAZOLE SODIUM 40 MG/1
40 TABLET, DELAYED RELEASE ORAL
Status: DISCONTINUED | OUTPATIENT
Start: 2019-02-10 | End: 2019-03-02 | Stop reason: HOSPADM

## 2019-02-10 RX ORDER — ONDANSETRON 2 MG/ML
4 INJECTION INTRAMUSCULAR; INTRAVENOUS
Status: COMPLETED | OUTPATIENT
Start: 2019-02-10 | End: 2019-02-10

## 2019-02-10 RX ORDER — BISACODYL 5 MG
5 TABLET, DELAYED RELEASE (ENTERIC COATED) ORAL DAILY PRN
Status: DISCONTINUED | OUTPATIENT
Start: 2019-02-10 | End: 2019-03-02 | Stop reason: HOSPADM

## 2019-02-10 RX ORDER — DIPHENHYDRAMINE HYDROCHLORIDE 50 MG/ML
12.5 INJECTION, SOLUTION INTRAMUSCULAR; INTRAVENOUS
Status: DISCONTINUED | OUTPATIENT
Start: 2019-02-10 | End: 2019-03-02 | Stop reason: HOSPADM

## 2019-02-10 RX ORDER — DIPHENHYDRAMINE HCL 25 MG
50 CAPSULE ORAL
Status: DISCONTINUED | OUTPATIENT
Start: 2019-02-10 | End: 2019-03-02 | Stop reason: HOSPADM

## 2019-02-10 RX ORDER — SODIUM CHLORIDE 0.9 % (FLUSH) 0.9 %
5-40 SYRINGE (ML) INJECTION EVERY 8 HOURS
Status: DISCONTINUED | OUTPATIENT
Start: 2019-02-10 | End: 2019-03-02 | Stop reason: HOSPADM

## 2019-02-10 RX ORDER — POLYETHYLENE GLYCOL 3350 17 G/17G
17 POWDER, FOR SOLUTION ORAL DAILY
Status: DISCONTINUED | OUTPATIENT
Start: 2019-02-11 | End: 2019-03-02 | Stop reason: HOSPADM

## 2019-02-10 RX ORDER — KETOROLAC TROMETHAMINE 30 MG/ML
15 INJECTION, SOLUTION INTRAMUSCULAR; INTRAVENOUS
Status: COMPLETED | OUTPATIENT
Start: 2019-02-10 | End: 2019-02-10

## 2019-02-10 RX ORDER — FOLIC ACID 1 MG/1
1 TABLET ORAL DAILY
Status: DISCONTINUED | OUTPATIENT
Start: 2019-02-11 | End: 2019-03-02 | Stop reason: HOSPADM

## 2019-02-10 RX ADMIN — ONDANSETRON 2 MG: 2 INJECTION INTRAMUSCULAR; INTRAVENOUS at 20:17

## 2019-02-10 RX ADMIN — SODIUM CHLORIDE 1000 ML: 900 INJECTION, SOLUTION INTRAVENOUS at 12:05

## 2019-02-10 RX ADMIN — PANTOPRAZOLE SODIUM 40 MG: 40 TABLET, DELAYED RELEASE ORAL at 17:46

## 2019-02-10 RX ADMIN — Medication 10 ML: at 22:03

## 2019-02-10 RX ADMIN — HEPARIN SODIUM 5000 UNITS: 5000 INJECTION INTRAVENOUS; SUBCUTANEOUS at 14:55

## 2019-02-10 RX ADMIN — DIPHENHYDRAMINE HYDROCHLORIDE 12.5 MG: 50 INJECTION, SOLUTION INTRAMUSCULAR; INTRAVENOUS at 14:55

## 2019-02-10 RX ADMIN — HYDROMORPHONE HYDROCHLORIDE 1 MG: 1 INJECTION, SOLUTION INTRAMUSCULAR; INTRAVENOUS; SUBCUTANEOUS at 11:22

## 2019-02-10 RX ADMIN — ONDANSETRON 4 MG: 2 INJECTION INTRAMUSCULAR; INTRAVENOUS at 10:02

## 2019-02-10 RX ADMIN — Medication 10 ML: at 17:52

## 2019-02-10 RX ADMIN — DIPHENHYDRAMINE HYDROCHLORIDE 12.5 MG: 50 INJECTION, SOLUTION INTRAMUSCULAR; INTRAVENOUS at 20:17

## 2019-02-10 RX ADMIN — DIPHENHYDRAMINE HYDROCHLORIDE 25 MG: 50 INJECTION, SOLUTION INTRAMUSCULAR; INTRAVENOUS at 10:02

## 2019-02-10 RX ADMIN — KETOROLAC TROMETHAMINE 15 MG: 30 INJECTION, SOLUTION INTRAMUSCULAR at 10:04

## 2019-02-10 RX ADMIN — HYDROMORPHONE HYDROCHLORIDE 1 MG: 1 INJECTION, SOLUTION INTRAMUSCULAR; INTRAVENOUS; SUBCUTANEOUS at 12:05

## 2019-02-10 RX ADMIN — HYDROMORPHONE HYDROCHLORIDE 1 MG: 1 INJECTION, SOLUTION INTRAMUSCULAR; INTRAVENOUS; SUBCUTANEOUS at 10:02

## 2019-02-10 RX ADMIN — HEPARIN SODIUM 5000 UNITS: 5000 INJECTION INTRAVENOUS; SUBCUTANEOUS at 21:57

## 2019-02-10 RX ADMIN — HYDROMORPHONE HYDROCHLORIDE 1 MG: 1 INJECTION, SOLUTION INTRAMUSCULAR; INTRAVENOUS; SUBCUTANEOUS at 17:46

## 2019-02-10 RX ADMIN — HYDROMORPHONE HYDROCHLORIDE 1 MG: 1 INJECTION, SOLUTION INTRAMUSCULAR; INTRAVENOUS; SUBCUTANEOUS at 21:59

## 2019-02-10 RX ADMIN — SODIUM CHLORIDE 1000 ML: 900 INJECTION, SOLUTION INTRAVENOUS at 10:14

## 2019-02-10 RX ADMIN — DEXTROSE MONOHYDRATE AND SODIUM CHLORIDE 100 ML/HR: 5; .45 INJECTION, SOLUTION INTRAVENOUS at 15:11

## 2019-02-10 RX ADMIN — DIPHENHYDRAMINE HYDROCHLORIDE 25 MG: 50 INJECTION, SOLUTION INTRAMUSCULAR; INTRAVENOUS at 11:22

## 2019-02-10 NOTE — H&P
History and Physical   
 
   
Pt Name  More Benz Date of Birth 1960 Medical Record Number  [id number] Age  66 y.o. PCP Lanette Ferro MD  
Admit date:  2/10/2019 Room Number  2103/01  @ [facility] Date of Service  2/10/2019 Admission Diagnoses:  Sickle cell disease ([facility] Certification: We are admitting More Benz 66 y.o. female with a principle diagnosis of Sickle cell disease ([facility] This patient also suffers from other comorbidities listed below. I have a high level of concern for persistent and progressive sickle symptoms  leading to life threatening complications Assessment and plan: 
Present on Admission:  Sickle cell disease ([facility]  Depression  Hepatitis C 
 Hypertension  Sickle cell pain crisis ([facility]  Sickle cell anemia (HCC) -Admit to remote tele bed  
-continuous oximetry  
-IV dilaudid 2mg IV Q4H PRN - I have essentially ordered similar dose that she was given during her prior admission  
-continue Fentanyl as she was on from home change Q72h 
-IVF  
-Push  PO fluids -Miralax to help with narcotic induced constipation 
-PPI for GERD symptoms Body mass index is 25.5 kg/m². - 
 
·   
 
 
CODE STATUS  Full Functional Status  Pt lives in [place], her daughter is helping her. She is independent with her ADLs Surrogate decision maker: Pt's daughter Prophylaxis  Hep SQ Discharge Plan: Home w/Family, There are currently no Active Isolations Payor: [place] MEDICARE / Plan: [place] MEDICARE PART A & B / Product Type: Medicare /   
Social issues  Date Comment 02/10/19  No family member or visitor here at this time Prognosis  Fair Subjective Data History of Present Illness : The pt went to home about nine days ago; pt reports that she went home sooner than her usual recovery without completing Rx. She attended her daughter's wedding the next day and continued to feel unwell.  She tried to take her home meds anticipating that things would get better. She developed a cough in the last two three days; no fever reported, no known flu/sick contact. She comes in to the ER today with her typical symptoms of non-specific rib cage, arm, back pain of sickle cell crisis. ER workup shows elevated Retic count. Review of Systems - History obtained from the patient General ROS: positive for  - fatigue 
negative for - chills or fever Psychological ROS: negative Ophthalmic ROS: negative Respiratory ROS: positive for - cough, clear sputum production  
negative for - hemoptysis Cardiovascular ROS: negative for - chest pain, loss of consciousness or palpitations Gastrointestinal ROS: negative for - appetite loss or blood in stools Genito-Urinary ROS: no dysuria, trouble voiding, or hematuria Musculoskeletal ROS: positive for - joint pain and Pain in the extremities  
negative for - joint swelling Neurological ROS: no TIA or stroke symptoms ROS Past Medical History:  
Diagnosis Date  Anemia SC hemoglobinopathy  Chronic pain  Depression  Hypertension  Liver disease   
 hepatitis C; pt reports \"cured\"  Sickle cell disease ([facility] Past Surgical History:  
Procedure Laterality Date  BREAST SURGERY PROCEDURE UNLISTED 2 cysts removed from R breast  
 CHEST SURGERY PROCEDURE UNLISTED    
 status post thor for removal of a benign  HX BREAST BIOPSY Right 05/2007  
 negative  HX CHOLECYSTECTOMY  HX ORTHOPAEDIC    
 bony curettage of an ostial myelitis focus Social History Tobacco Use  Smoking status: Never Smoker  Smokeless tobacco: Never Used Substance Use Topics  Alcohol use: No  
   
 
Family History Problem Relation Age of Onset  Hypertension Mother  Hypertension Father Objective data Comment:  Very pleasant lady lying in ER bed in no distress Patient Vitals for the past 24 hrs: 
 Temp 02/10/19 1635 99.5 °F (37.5 °C)  
02/10/19 1515 98.8 °F (37.1 °C)  
02/10/19 0903 98.2 °F (36.8 °C) Patient Vitals for the past 24 hrs: 
 Pulse 02/10/19 1635 81  
02/10/19 1600 81  
02/10/19 1515 85  
02/10/19 1345 90  
02/10/19 0903 (!) 112 Patient Vitals for the past 24 hrs: 
 Resp  
02/10/19 1635 18  
02/10/19 1515 18  
02/10/19 0903 20 Patient Vitals for the past 24 hrs: 
 BP  
02/10/19 1635 129/73  
02/10/19 1600 117/57  
02/10/19 1515 125/66  
02/10/19 1345 130/76  
02/10/19 1215 134/69  
02/10/19 1130 127/69  
02/10/19 0903 129/90 SpO2 Readings from Last 6 Encounters:  
02/10/19 100% 02/07/19 98% 02/01/19 95% 01/10/19 98% 01/03/19 97% 11/13/18 93% O2 Device: Room air Body mass index is 25.5 kg/m². - Wt Readings from Last 10 Encounters:  
02/10/19 80.6 kg (177 lb 11.1 oz) 02/07/19 82.2 kg (181 lb 4.8 oz) 01/19/19 79.8 kg (175 lb 14.8 oz) 01/10/19 82.9 kg (182 lb 12.8 oz)  
12/12/18 77 kg (169 lb 12.1 oz)  
11/13/18 89.4 kg (197 lb 1.6 oz) 10/29/18 77.4 kg (170 lb 10.2 oz)  
10/02/18 91.5 kg (201 lb 12.8 oz) 09/24/18 86.3 kg (190 lb 3.2 oz) 09/07/18 83.9 kg (185 lb) Physical Exam:            
General:  Alert, cooperative,  
well noursished,  
well developed,  
appears stated age Ears/Eyes:  Hearing intact Sclera anicteric. Pupils equal  
Mouth/Throat:  Mucous membranes normal pink and moist 
Oral pharynx clear Neck:    
Lungs:  Trachea midline Chest excursion symmetrical  
Auscultation B/L Symmetrical with Vesicular breath sounds No Crepitations noted Percussion note resonant on mid Clavicular line; no sign of pneumothorax CVS:  Regular rate and rhythm  
no  murmur, No click, rub or gallop S1 normal  
S2 normal  
Pedal pulses  b/l symmetrical  
Abdomen:  Obese Soft, non-tender Bowel sounds normal 
No distension Percussion note tympanitic Extremities:  Non-specific tenderness over ribs and arms No cyanosis, jaundice No edema noted No sign of DVT/cord like lesion on palpation No sign of acute trauma Age appropriate OA changes noted. Skin:   
Skin color, texture, turgor normal. no acute rash or lesions Lymph nodes: Musculoskeletal Muscle bulk B/L symmetrical  
Neuro Cranial nerves are intact,  
motor movement b/l symmetrical, Sensory evaluation b/l symmetrical   
Psych:  Alert and oriented, normal mood & affect given the clinical scenario Medications reviewed Current Facility-Administered Medications Medication Dose Route Frequency  [START ON 2/11/2019] citalopram (CELEXA) tablet 10 mg  10 mg Oral DAILY  diphenhydrAMINE (BENADRYL) capsule 50 mg  50 mg Oral DAILY PRN  
 fentaNYL (DURAGESIC) 75 mcg/hr patch 1 Patch  1 Patch TransDERmal Q72H  [START ON 4/10/6398] folic acid (FOLVITE) tablet 1 mg  1 mg Oral DAILY  [START ON 2/11/2019] losartan (COZAAR) 50 mg, hydroCHLOROthiazide (MICROZIDE) 12.5 mg   Oral DAILY  sodium chloride (NS) flush 5-40 mL  5-40 mL IntraVENous Q8H  
 sodium chloride (NS) flush 5-40 mL  5-40 mL IntraVENous PRN  
 naloxone (NARCAN) injection 0.4 mg  0.4 mg IntraVENous PRN  
 ondansetron (ZOFRAN) injection 2 mg  2 mg IntraVENous Q6H PRN  
 bisacodyl (DULCOLAX) tablet 5 mg  5 mg Oral DAILY PRN  
 nicotine (NICODERM CQ) 7 mg/24 hr patch 1 Patch  1 Patch TransDERmal DAILY PRN  
 heparin (porcine) injection 5,000 Units  5,000 Units SubCUTAneous Q8H  
 dextrose 5 % - 0.45% NaCl infusion  100 mL/hr IntraVENous CONTINUOUS  
 HYDROmorphone (PF) (DILAUDID) injection 1 mg  1 mg IntraVENous Q4H PRN  
 diphenhydrAMINE (BENADRYL) injection 12.5 mg  12.5 mg IntraVENous Q6H PRN  
 [START ON 2/11/2019] polyethylene glycol (MIRALAX) packet 17 g  17 g Oral DAILY  pantoprazole (PROTONIX) tablet 40 mg  40 mg Oral ACB Relevant other informations:   
 
Other medical conditions listed in this following active hospital problem list section; all of these and other pertinent data were taken into consideration when treatment plan is developed and customized to this patient's unique overall circumstances and needs. We have reviewed available old medical records within the constraints of this admission process. Present on Admission:  Sickle cell disease ([facility]  Depression  Hepatitis C 
 Hypertension  Sickle cell pain crisis ([facility]  Sickle cell anemia (HCC) Data Review:  
Recent Days: 
All Micro Results Procedure Component Value Units Date/Time INFLUENZA A & B AG (RAPID TEST) [[id number]] Collected:  02/10/19 1611 Order Status:  Completed Specimen:  Nasopharyngeal from Nasal washing Updated:  02/10/19 1642 Influenza A Antigen NEGATIVE Influenza B Antigen NEGATIVE Recent Labs  
  02/10/19 
1588 WBC 5.1 HGB 9.8* HCT 28.8* * Recent Labs  
  02/10/19 
3217   
K 3.0*  
 CO2 24 GLU 93 BUN 9  
CREA 0.97 CA 8.7 ALB 4.1 TBILI 1.5* SGOT 43* ALT 23 Lab Results Component Value Date/Time TSH 1.360 03/13/2018 12:30 PM  
 
 
 
Care Plan discussed with: Patient/Family and Nurse Other medical conditions are listed in the active hospital problem list section; these and other pertinent data were taken into consideration when the treatment plan was developed and customized to this patient's unique overall circumstances and needs. High complexity decision making was performed for this patient who is at high risk for decompensation with multiple organ involvement. Today total floor/unit time was 60  minutes while caring for this patient and greater than 50% of that time was spent with patient (and/or family) coordinating patients clinical issues. Corrie Rothman MD MPH  FACP                              
2/10/2019  
 
 
__________________________________________________________ FOR SOUND PHYSICIAN ADMINISTRATIVE USE ONLY  
MDM 
 
  
 INPT 223 Epi Castellon MD MPH FACP  
4:47 PM 
2/10/2019

## 2019-02-10 NOTE — ED NOTES
Put in for transport. Updated rafael that flu swab sent and messaged pharmacy to send fentanyl patch upstairs

## 2019-02-10 NOTE — ED NOTES
Pt resting. Still at about 8/10, not noticeably in pain.   Call bell within reach awaiting to be seen by hospitalist

## 2019-02-10 NOTE — ED PROVIDER NOTES
EMERGENCY DEPARTMENT HISTORY AND PHYSICAL EXAM 
 
 
Date: 2/10/2019 Patient Name: Eugenie Cotto History of Presenting Illness Chief Complaint Patient presents with  Sickle Cell Crisis  
  pt stated she started with a cold and is having a sickle cell crisis that started yesterday PROVIDER IN TRIAGE NOTE: 
9:11 AM 
Stefanie Rosa PA-C has evaluated the patient as the Provider in Triage (PIT) / provider in [facility] for sickle cell crisis. The vital signs and the triage nurse assessment have been reviewed. The patient and any available family have been advised that the appropriate studies have been ordered to initiate the work up based on the clinical presentation during the assessment. The pt has been advised that they will be accommodated in Emergency Department as soon as possible. The pt has been requested to contact the triage nurse or PIT/J[facility] immediately if they experiences any changes in their condition during this brief waiting period. History Provided By: Patient HPI: Eugenie Cotto, 66 y.o. female with PMHx significant for sickle cell disease, anemia, HTN presents ambulatory to the ED with cc of diffuse myalgias for the past 2 days that have worsened today. She states her pain is particularly worse to her ribs, right inner thigh, and right elbow. She states her symptoms feel like her past flares of sickle cell disease. She also reports cough and subjective fever/chills. She denies SOB, NV, or dysuria. There are no other complaints, changes, or physical findings at this time. PCP: Allison Kulkarni MD 
 
No current facility-administered medications on file prior to encounter. Current Outpatient Medications on File Prior to Encounter Medication Sig Dispense Refill  
 HYDROcodone-acetaminophen (NORCO)  mg tablet Take 1 Tab by mouth every six (6) hours as needed. Max Daily Amount: 4 Tabs.  120 Tab 0  
  diphenhydrAMINE (BENADRYL) 25 mg tablet Take 50 mg by mouth daily as needed (for itching with hydrocodone).  folic acid (FOLVITE) 1 mg tablet Take 1 Tab by mouth daily. 30 Tab 11  
 citalopram (CELEXA) 10 mg tablet TAKE 1 TABLET BY MOUTH EVERY NIGHT AT BEDTIME 90 Tab 3  
 losartan-hydroCHLOROthiazide (HYZAAR) 50-12.5 mg per tablet TAKE 1 TABLET BY MOUTH DAILY 90 Tab 3  
 fentaNYL (DURAGESIC) 75 mcg/hr 1 Patch by TransDERmal route every seventy-two (72) hours. Max Daily Amount: 1 Patch. 10 Patch 0  
 ondansetron (ZOFRAN ODT) 4 mg disintegrating tablet Take 1 Tab by mouth every eight (8) hours as needed for Nausea. 10 Tab 0  
 hydrocortisone (CORTISONE) 1 % topical cream Apply  to affected area two (2) times daily as needed for Skin Irritation. use thin layer Past History Past Medical History: 
Past Medical History:  
Diagnosis Date  Anemia SC hemoglobinopathy  Chronic pain  Depression  Hypertension  Liver disease   
 hepatitis C; pt reports \"cured\"  Sickle cell disease ([facility] [address] Past Surgical History: 
Past Surgical History:  
Procedure Laterality Date  BREAST SURGERY PROCEDURE UNLISTED 2 cysts removed from R breast  
 CHEST SURGERY PROCEDURE UNLISTED    
 status post thor for removal of a benign  HX BREAST BIOPSY Right 05/2007  
 negative  HX CHOLECYSTECTOMY  HX ORTHOPAEDIC    
 bony curettage of an ostial myelitis focus Family History: 
Family History Problem Relation Age of Onset  Hypertension Mother  Hypertension Father Social History: 
Social History Tobacco Use  Smoking status: Never Smoker  Smokeless tobacco: Never Used Substance Use Topics  Alcohol use: No  
 Drug use: No  
 
 
Allergies: Allergies Allergen Reactions  Dilaudid [Hydromorphone (Bulk)] Itching Can tolerate with benadryl and ondansetron  Percocet [Oxycodone-Acetaminophen] Itching Review of Systems Review of Systems Constitutional: Positive for chills and fever. Negative for appetite change and fatigue. HENT: Negative. Negative for congestion, rhinorrhea, sinus pressure and sore throat. Eyes: Negative. Respiratory: Positive for cough. Negative for choking, chest tightness, shortness of breath and wheezing. Cardiovascular: Negative. Negative for chest pain, palpitations and leg swelling. Gastrointestinal: Negative for abdominal pain, constipation, diarrhea, nausea and vomiting. Endocrine: Negative. Genitourinary: Negative. Negative for difficulty urinating, dysuria, flank pain and urgency. Musculoskeletal: Positive for myalgias. Skin: Negative. Neurological: Negative. Negative for dizziness, speech difficulty, weakness, light-headedness, numbness and headaches. Psychiatric/Behavioral: Negative. All other systems reviewed and are negative. Physical Exam  
Physical Exam  
Constitutional: She is oriented to person, place, and time. She appears well-developed and well-nourished. No distress. HENT:  
Head: Normocephalic and atraumatic. Mouth/Throat: Oropharynx is clear and moist. No oropharyngeal exudate. Eyes: Conjunctivae and EOM are normal. Pupils are equal, round, and reactive to light. Neck: Normal range of motion. Neck supple. No JVD present. No tracheal deviation present. Cardiovascular: Regular rhythm, normal heart sounds and intact distal pulses. No murmur heard. Tachycardic Pulmonary/Chest: Effort normal and breath sounds normal. No stridor. No respiratory distress. She has no wheezes. She has no rales. She exhibits no tenderness. Abdominal: Soft. She exhibits no distension. There is no tenderness. There is no rebound and no guarding. Musculoskeletal: Normal range of motion. She exhibits no edema or tenderness. Neurological: She is alert and oriented to person, place, and time. No cranial nerve deficit. No gross motor or sensory deficits Skin: Skin is warm and dry. She is not diaphoretic. Psychiatric: She has a normal mood and affect. Her behavior is normal.  
Nursing note and vitals reviewed. Diagnostic Study Results Labs - Recent Results (from the past 12 hour(s)) EKG, 12 LEAD, INITIAL Collection Time: 02/10/19  9:23 AM  
Result Value Ref Range Ventricular Rate 111 BPM  
 Atrial Rate 111 BPM  
 P-R Interval 148 ms QRS Duration 86 ms  
 Q-T Interval 330 ms QTC Calculation (Bezet) 448 ms Calculated P Axis 62 degrees Calculated R Axis 55 degrees Calculated T Axis 53 degrees Diagnosis Sinus tachycardia Possible Left atrial enlargement When compared with ECG of 12-DEC-2018 18:20, 
Vent. rate has increased BY  39 BPM 
  
CBC WITH AUTOMATED DIFF Collection Time: 02/10/19  9:51 AM  
Result Value Ref Range WBC 5.1 3.6 - 11.0 K/uL  
 RBC 3.16 (L) 3.80 - 5.20 M/uL HGB 9.8 (L) 11.5 - 16.0 g/dL HCT 28.8 (L) 35.0 - 47.0 % MCV 91.1 80.0 - 99.0 FL  
 MCH 31.0 26.0 - 34.0 PG  
 MCHC 34.0 30.0 - 36.5 g/dL  
 RDW 18.3 (H) 11.5 - 14.5 % PLATELET 481 (H) 358 - 400 K/uL MPV 11.1 8.9 - 12.9 FL  
 NRBC 0.0 0  WBC ABSOLUTE NRBC 0.00 0.00 - 0.01 K/uL NEUTROPHILS 41 32 - 75 % LYMPHOCYTES 44 12 - 49 % MONOCYTES 12 5 - 13 % EOSINOPHILS 2 0 - 7 % BASOPHILS 1 0 - 1 % IMMATURE GRANULOCYTES 0 0.0 - 0.5 % ABS. NEUTROPHILS 2.1 1.8 - 8.0 K/UL  
 ABS. LYMPHOCYTES 2.2 0.8 - 3.5 K/UL  
 ABS. MONOCYTES 0.6 0.0 - 1.0 K/UL  
 ABS. EOSINOPHILS 0.1 0.0 - 0.4 K/UL  
 ABS. BASOPHILS 0.0 0.0 - 0.1 K/UL  
 ABS. IMM. GRANS. 0.0 0.00 - 0.04 K/UL  
 DF AUTOMATED METABOLIC PANEL, COMPREHENSIVE Collection Time: 02/10/19  9:51 AM  
Result Value Ref Range Sodium 137 136 - 145 mmol/L Potassium 3.0 (L) 3.5 - 5.1 mmol/L Chloride 105 97 - 108 mmol/L  
 CO2 24 21 - 32 mmol/L Anion gap 8 5 - 15 mmol/L Glucose 93 65 - 100 mg/dL BUN 9 6 - 20 MG/DL  Creatinine 0.97 0.55 - 1.02 MG/DL  
 BUN/Creatinine ratio 9 (L) 12 - 20 GFR est AA >60 >60 ml/min/1.73m2 GFR est non-AA 59 (L) >60 ml/min/1.73m2 Calcium 8.7 8.5 - 10.1 MG/DL Bilirubin, total 1.5 (H) 0.2 - 1.0 MG/DL  
 ALT (SGPT) 23 12 - 78 U/L  
 AST (SGOT) 43 (H) 15 - 37 U/L Alk. phosphatase 119 (H) 45 - 117 U/L Protein, total 10.0 (H) 6.4 - 8.2 g/dL Albumin 4.1 3.5 - 5.0 g/dL Globulin 5.9 (H) 2.0 - 4.0 g/dL A-G Ratio 0.7 (L) 1.1 - 2.2 RETICULOCYTE COUNT Collection Time: 02/10/19  9:51 AM  
Result Value Ref Range Reticulocyte count 1.3 0.7 - 2.1 % Absolute Retic Cnt. 0.0401 0.0164 - 0.0776 M/ul CK-MB,QUANT. Collection Time: 02/10/19  9:51 AM  
Result Value Ref Range CK - MB <1.0 <3.6 NG/ML  
 CK-MB Index Cannot be calculated 0.0 - 2.5 CK Collection Time: 02/10/19  9:51 AM  
Result Value Ref Range CK 66 26 - 192 U/L  
TROPONIN I Collection Time: 02/10/19  9:51 AM  
Result Value Ref Range Troponin-I, Qt. <0.05 <0.05 ng/mL Radiologic Studies -  
XR CHEST PA LAT Final Result Impression: No acute cardiopulmonary process. CT Results  (Last 48 hours) None CXR Results  (Last 48 hours) 02/10/19 0923  XR CHEST PA LAT Final result Impression:  Impression: No acute cardiopulmonary process. Narrative:  Chest PA and lateral  
   
History: Sickle cell crisis Comparison: 1/19/2018 Findings: Unchanged scarring is seen in the right perihilar region. The lungs  
are well expanded. No focal consolidation, pleural effusion, or pneumothorax. The cardiomediastinal silhouette is unremarkable. There is levoconvex scoliosis  
of the thoracolumbar junction. Mild spondylosis is seen in the mid thoracic  
spine. There is unchanged mild loss of height in 2 vertebral bodies in the mid  
to lower thoracic spine. Surgical clips are seen in the right upper quadrant. Medical Decision Making I am the first provider for this patient. I reviewed the vital signs, available nursing notes, past medical history, past surgical history, family history and social history. Vital Signs-Reviewed the patient's vital signs. Patient Vitals for the past 12 hrs: 
 Temp Pulse Resp BP SpO2  
02/10/19 1130    127/69 100 % 02/10/19 0903 98.2 °F (36.8 °C) (!) 112 20 129/90 100 % Pulse Oximetry Analysis - 100% on RA Cardiac Monitor:  
Rate: 112 bpm 
Rhythm: Sinus Tachycardia EKG interpretation: (Preliminary) Sinus tach rate 111, normal axis/pr/qrs, NSST, Irena Carmona DO 
 
 
Records Reviewed: Nursing Notes, Old Medical Records, Previous Radiology Studies and Previous Laboratory Studies Provider Notes (Medical Decision Making): DDx: sickle cell, anemia of chronic disease, acute chest syndrome, electrolyte abnormality ED Course:  
Initial assessment performed. The patients presenting problems have been discussed, and they are in agreement with the care plan formulated and outlined with them. I have encouraged them to ask questions as they arise throughout their visit. 11:24 AM 
Patient requesting more pain medication Consult Note: 
11:40 AM 
Army Alecia DO spoke with Dr. Taye Guerrero, Specialty: PCP Discussed pt's hx, disposition, and available diagnostic and imaging results. Reviewed care plans. Consultant agrees with plans as outlined. Agrees with plan for admission. CONSULT NOTE:  
11:46 AM 
Army Alecia DO spoke with Emma Romero MD  
Specialty: Hospitalist 
Discussed pt's hx, disposition, and available diagnostic and imaging results. Reviewed care plans. Consultant will evaluate pt for admission. Written by OZZIE Archibald, as dictated by Army Alecia DO. 
 
 
Critical Care Time:  
0 minutes Disposition: 
Admit Note: 
11:46 AM 
Pt is being admitted by Dr. Maria Luz Pepe.  The results of their tests and reason(s) for their admission have been discussed with pt and/or available family. They convey agreement and understanding for the need to be admitted and for admission diagnosis. PLAN: 
Admit Diagnosis Clinical Impression: 1. Sickle cell pain crisis ([address] Attestations: This note is prepared by Williams Gilliland, acting as Scribe for Airam Parents, DO CoombsAiram Parents, DO: The scribe's documentation has been prepared under my direction and personally reviewed by me in its entirety. I confirm that the note above accurately reflects all work, treatment, procedures, and medical decision making performed by me.

## 2019-02-11 LAB
ANION GAP SERPL CALC-SCNC: 5 MMOL/L (ref 5–15)
ATRIAL RATE: 111 BPM
BASOPHILS # BLD: 0 K/UL (ref 0–0.1)
BASOPHILS NFR BLD: 0 % (ref 0–1)
BUN SERPL-MCNC: 12 MG/DL (ref 6–20)
BUN/CREAT SERPL: 16 (ref 12–20)
CALCIUM SERPL-MCNC: 8.2 MG/DL (ref 8.5–10.1)
CALCULATED P AXIS, ECG09: 62 DEGREES
CALCULATED R AXIS, ECG10: 55 DEGREES
CALCULATED T AXIS, ECG11: 53 DEGREES
CHLORIDE SERPL-SCNC: 107 MMOL/L (ref 97–108)
CO2 SERPL-SCNC: 24 MMOL/L (ref 21–32)
CREAT SERPL-MCNC: 0.73 MG/DL (ref 0.55–1.02)
DIAGNOSIS, 93000: NORMAL
DIFFERENTIAL METHOD BLD: ABNORMAL
EOSINOPHIL # BLD: 0.8 K/UL (ref 0–0.4)
EOSINOPHIL NFR BLD: 8 % (ref 0–7)
ERYTHROCYTE [DISTWIDTH] IN BLOOD BY AUTOMATED COUNT: 17.9 % (ref 11.5–14.5)
GLUCOSE BLD STRIP.AUTO-MCNC: 101 MG/DL (ref 65–100)
GLUCOSE BLD STRIP.AUTO-MCNC: 112 MG/DL (ref 65–100)
GLUCOSE BLD STRIP.AUTO-MCNC: 134 MG/DL (ref 65–100)
GLUCOSE SERPL-MCNC: 87 MG/DL (ref 65–100)
HCT VFR BLD AUTO: 22.9 % (ref 35–47)
HGB BLD-MCNC: 7.7 G/DL (ref 11.5–16)
IMM GRANULOCYTES # BLD AUTO: 0 K/UL (ref 0–0.04)
IMM GRANULOCYTES NFR BLD AUTO: 0 % (ref 0–0.5)
LYMPHOCYTES # BLD: 6.2 K/UL (ref 0.8–3.5)
LYMPHOCYTES NFR BLD: 63 % (ref 12–49)
MCH RBC QN AUTO: 30.3 PG (ref 26–34)
MCHC RBC AUTO-ENTMCNC: 33.6 G/DL (ref 30–36.5)
MCV RBC AUTO: 90.2 FL (ref 80–99)
MONOCYTES # BLD: 0.6 K/UL (ref 0–1)
MONOCYTES NFR BLD: 6 % (ref 5–13)
NEUTS SEG # BLD: 2.3 K/UL (ref 1.8–8)
NEUTS SEG NFR BLD: 23 % (ref 32–75)
NRBC # BLD: 0.02 K/UL (ref 0–0.01)
NRBC BLD-RTO: 0.2 PER 100 WBC
P-R INTERVAL, ECG05: 148 MS
PLATELET # BLD AUTO: 330 K/UL (ref 150–400)
PMV BLD AUTO: 10.7 FL (ref 8.9–12.9)
POTASSIUM SERPL-SCNC: 3.8 MMOL/L (ref 3.5–5.1)
Q-T INTERVAL, ECG07: 330 MS
QRS DURATION, ECG06: 86 MS
QTC CALCULATION (BEZET), ECG08: 448 MS
RBC # BLD AUTO: 2.54 M/UL (ref 3.8–5.2)
RBC MORPH BLD: ABNORMAL
RBC MORPH BLD: ABNORMAL
SERVICE CMNT-IMP: ABNORMAL
SODIUM SERPL-SCNC: 136 MMOL/L (ref 136–145)
VENTRICULAR RATE, ECG03: 111 BPM
WBC # BLD AUTO: 9.9 K/UL (ref 3.6–11)

## 2019-02-11 PROCEDURE — 36556 INSERT NON-TUNNEL CV CATH: CPT | Performed by: ANESTHESIOLOGY

## 2019-02-11 PROCEDURE — 36415 COLL VENOUS BLD VENIPUNCTURE: CPT

## 2019-02-11 PROCEDURE — 82962 GLUCOSE BLOOD TEST: CPT

## 2019-02-11 PROCEDURE — 65270000029 HC RM PRIVATE

## 2019-02-11 PROCEDURE — 85025 COMPLETE CBC W/AUTO DIFF WBC: CPT

## 2019-02-11 PROCEDURE — 80048 BASIC METABOLIC PNL TOTAL CA: CPT

## 2019-02-11 PROCEDURE — 74011250637 HC RX REV CODE- 250/637: Performed by: INTERNAL MEDICINE

## 2019-02-11 PROCEDURE — 74011000258 HC RX REV CODE- 258: Performed by: INTERNAL MEDICINE

## 2019-02-11 PROCEDURE — 74011250636 HC RX REV CODE- 250/636: Performed by: INTERNAL MEDICINE

## 2019-02-11 RX ORDER — HYDROCHLOROTHIAZIDE 25 MG/1
12.5 TABLET ORAL DAILY
Status: DISCONTINUED | OUTPATIENT
Start: 2019-02-12 | End: 2019-02-11

## 2019-02-11 RX ORDER — LOSARTAN POTASSIUM 25 MG/1
50 TABLET ORAL DAILY
Status: DISCONTINUED | OUTPATIENT
Start: 2019-02-12 | End: 2019-03-02 | Stop reason: HOSPADM

## 2019-02-11 RX ORDER — DEXTROSE, SODIUM CHLORIDE, AND POTASSIUM CHLORIDE 5; .45; .15 G/100ML; G/100ML; G/100ML
25 INJECTION INTRAVENOUS CONTINUOUS
Status: DISCONTINUED | OUTPATIENT
Start: 2019-02-11 | End: 2019-03-01

## 2019-02-11 RX ADMIN — DEXTROSE MONOHYDRATE AND SODIUM CHLORIDE 100 ML/HR: 5; .45 INJECTION, SOLUTION INTRAVENOUS at 01:25

## 2019-02-11 RX ADMIN — HYDROMORPHONE HYDROCHLORIDE 1 MG: 1 INJECTION, SOLUTION INTRAMUSCULAR; INTRAVENOUS; SUBCUTANEOUS at 23:59

## 2019-02-11 RX ADMIN — HEPARIN SODIUM 5000 UNITS: 5000 INJECTION INTRAVENOUS; SUBCUTANEOUS at 13:49

## 2019-02-11 RX ADMIN — FOLIC ACID 1 MG: 1 TABLET ORAL at 08:20

## 2019-02-11 RX ADMIN — HYDROMORPHONE HYDROCHLORIDE 1 MG: 1 INJECTION, SOLUTION INTRAMUSCULAR; INTRAVENOUS; SUBCUTANEOUS at 13:48

## 2019-02-11 RX ADMIN — DIPHENHYDRAMINE HYDROCHLORIDE 12.5 MG: 50 INJECTION, SOLUTION INTRAMUSCULAR; INTRAVENOUS at 02:39

## 2019-02-11 RX ADMIN — HEPARIN SODIUM 5000 UNITS: 5000 INJECTION INTRAVENOUS; SUBCUTANEOUS at 05:17

## 2019-02-11 RX ADMIN — POLYETHYLENE GLYCOL 3350 17 G: 17 POWDER, FOR SOLUTION ORAL at 08:20

## 2019-02-11 RX ADMIN — HEPARIN SODIUM 5000 UNITS: 5000 INJECTION INTRAVENOUS; SUBCUTANEOUS at 22:06

## 2019-02-11 RX ADMIN — DEXTROSE MONOHYDRATE AND SODIUM CHLORIDE 100 ML/HR: 5; .45 INJECTION, SOLUTION INTRAVENOUS at 12:23

## 2019-02-11 RX ADMIN — ONDANSETRON 2 MG: 2 INJECTION INTRAMUSCULAR; INTRAVENOUS at 08:20

## 2019-02-11 RX ADMIN — CITALOPRAM HYDROBROMIDE 10 MG: 20 TABLET ORAL at 08:20

## 2019-02-11 RX ADMIN — HYDROCHLOROTHIAZIDE: 12.5 CAPSULE ORAL at 08:31

## 2019-02-11 RX ADMIN — DIPHENHYDRAMINE HYDROCHLORIDE 12.5 MG: 50 INJECTION, SOLUTION INTRAMUSCULAR; INTRAVENOUS at 14:53

## 2019-02-11 RX ADMIN — Medication 10 ML: at 05:16

## 2019-02-11 RX ADMIN — Medication 10 ML: at 13:51

## 2019-02-11 RX ADMIN — DIPHENHYDRAMINE HYDROCHLORIDE 12.5 MG: 50 INJECTION, SOLUTION INTRAMUSCULAR; INTRAVENOUS at 08:20

## 2019-02-11 RX ADMIN — ONDANSETRON 2 MG: 2 INJECTION INTRAMUSCULAR; INTRAVENOUS at 02:38

## 2019-02-11 RX ADMIN — ONDANSETRON 2 MG: 2 INJECTION INTRAMUSCULAR; INTRAVENOUS at 14:50

## 2019-02-11 RX ADMIN — HYDROMORPHONE HYDROCHLORIDE 1 MG: 1 INJECTION, SOLUTION INTRAMUSCULAR; INTRAVENOUS; SUBCUTANEOUS at 01:15

## 2019-02-11 RX ADMIN — HYDROMORPHONE HYDROCHLORIDE 1 MG: 1 INJECTION, SOLUTION INTRAMUSCULAR; INTRAVENOUS; SUBCUTANEOUS at 05:12

## 2019-02-11 RX ADMIN — PANTOPRAZOLE SODIUM 40 MG: 40 TABLET, DELAYED RELEASE ORAL at 05:20

## 2019-02-11 RX ADMIN — ONDANSETRON 2 MG: 2 INJECTION INTRAMUSCULAR; INTRAVENOUS at 23:59

## 2019-02-11 RX ADMIN — HYDROMORPHONE HYDROCHLORIDE 1 MG: 1 INJECTION, SOLUTION INTRAMUSCULAR; INTRAVENOUS; SUBCUTANEOUS at 19:00

## 2019-02-11 RX ADMIN — HYDROMORPHONE HYDROCHLORIDE 1 MG: 1 INJECTION, SOLUTION INTRAMUSCULAR; INTRAVENOUS; SUBCUTANEOUS at 09:17

## 2019-02-11 RX ADMIN — DIPHENHYDRAMINE HYDROCHLORIDE 12.5 MG: 50 INJECTION, SOLUTION INTRAMUSCULAR; INTRAVENOUS at 23:59

## 2019-02-11 NOTE — PROGRESS NOTES
Patient IV infiltrated, called Ed to have someone come start new IV with US due to patient veins very hard to find and hold. Gave bedside report to [employer] and [employer]

## 2019-02-11 NOTE — PROGRESS NOTES
Physical Therapy Order received, chart reviewed. Patient declines need for PT eval. She reports independence and that she is taking herself to bathroom and managing IV pole. Nurse confirms. Will complete this order as patient states she does not want/need PT evaluation

## 2019-02-11 NOTE — PROGRESS NOTES
Patient declines need for OT eval. She is taking herself to bathroom and managing IV pole. Nurse confirms. Will complete this order as patient states she does not want/need Ot evaluation.

## 2019-02-11 NOTE — PROGRESS NOTES
General Surgery End of Shift Nursing Note Bedside shift change report given to [address] (oncoming nurse) by Genaro Hook RN (offgoing nurse). Report included the following information SBAR, Kardex, Intake/Output, MAR, Recent Results and Cardiac Rhythm SR. Shift worked:   7p-7a Summary of shift:    CBC with diff, BMP drawn, IV infiltrated in L ac and was restarted by EMT from ED, pt is interested in getting a PICC line Issues for physician to address:   Pt expressed interest in getting a PICC line this am  
 
Number times ambulated in hallway past shift: 0 Number of times OOB to chair past shift: 0 Pain Management: 
Current medication: Dilaudid 1mg IVP Patient states pain is manageable on current pain medication: YES 
 
GI: 
 
Current diet:  DIET CARDIAC Regular Tolerating current diet: YES Passing flatus: YES Respiratory: 
 
Incentive Spirometer at bedside: NO 
Patient instructed on use: NO 
 
Patient Safety: 
 
Falls Score: 1 Bed Alarm On? No 
Sitter?  No 
 
Sunil Sauceda RN

## 2019-02-11 NOTE — PROGRESS NOTES
General Surgery End of Shift Nursing Note Bedside shift change report given to [id number] (oncoming nurse) by Meera Martin RN (offgoing nurse). Report included the following information SBAR. Shift worked:   7am-7pm  
Summary of shift:  Patient has been given pain medication, zofran and benadryl on this shift Pain is consistantly a 6-7 Resting most of the day Issues for physician to address:     
 
Number times ambulated in hallway past shift: 0 Number of times OOB to chair past shift: 1 Pain Management: 
Current medication: Dilaudid Patient states pain is manageable on current pain medication: YES 
 
GI: 
 
Current diet:  DIET CARDIAC Regular Tolerating current diet: YES Passing flatus: YES Last Bowel Movement: several days ago Appearance:  
 
Respiratory: 
 
Incentive Spirometer at bedside:  
Patient instructed on use:  
 
Patient Safety: 
 
Falls Score: 1 Bed Alarm On? Not applicable Sitter? Not applicable Meera Martin

## 2019-02-11 NOTE — PROGRESS NOTES
9:51AM 
 
Reason for Readmission:     Sickle cell disease RRAT Score and Risk Level:     21/High Level of Readmission:    3 Care Conference scheduled:   Not at this time Resources/supports as identified by patient/family:   Supportive family Top Challenges facing patient (as identified by patient/family and CM): Finances/Medication cost?     No needs identified Transportation      Self and family Support system or lack thereof? Supportive family Living arrangements? Alone Self-care/ADLs/Cognition? Independent/Oriented Current Advanced Directive/Advance Care Plan:  Not on file/Full code Plan for utilizing home health:   TBD Likelihood of additional readmission:   High  
          
Transition of Care Plan:    Home CM met with pt to complete assessment and discuss d/c planning. Pt is a 65 yo AAF admitted for sickle cell crisis. Pt was previously admitted at [facility] 1/19-2/1/19 also for sickle cell crisis. Pt was also admitted several times in the fall for the same issue. Pt was living with her dtr but her dtr recently moved out. Pt is independent at baseline for ADLs and IADLs, including driving. No DME use. Pt's dtr also lives nearby and is accessible if the pt needs assistance. Pt states that her brother, Jass Carney, will likely be the one to transport her home at d/c. PT/OT evals pending. 10:33AM 
Inbox message sent to Dr. Michael Echols NN Billy Garcia to update on admission and d/c planning. CM will continue to follow and assist with d/c planning. Care Management Interventions PCP Verified by CM: Yes(Dr. Jenna Aguiar ) Mode of Transport at Discharge: Other (see comment)(Pt's brother ) Transition of Care Consult (CM Consult): Discharge Planning Discharge Durable Medical Equipment: No 
Physical Therapy Consult: Yes Occupational Therapy Consult: Yes Speech Therapy Consult: No 
Current Support Network: Own Home, Lives Alone Confirm Follow Up Transport: Self Plan discussed with Pt/Family/Caregiver: Yes Discharge Location Discharge Placement: Home Marni Silverio MSW Care Manager

## 2019-02-12 LAB
ANION GAP SERPL CALC-SCNC: 4 MMOL/L (ref 5–15)
BASOPHILS # BLD: 0.1 K/UL (ref 0–0.1)
BASOPHILS NFR BLD: 1 % (ref 0–1)
BLASTS NFR BLD MANUAL: 0 %
BUN SERPL-MCNC: 9 MG/DL (ref 6–20)
BUN/CREAT SERPL: 14 (ref 12–20)
CALCIUM SERPL-MCNC: 8.4 MG/DL (ref 8.5–10.1)
CHLORIDE SERPL-SCNC: 105 MMOL/L (ref 97–108)
CO2 SERPL-SCNC: 29 MMOL/L (ref 21–32)
CREAT SERPL-MCNC: 0.66 MG/DL (ref 0.55–1.02)
DIFFERENTIAL METHOD BLD: ABNORMAL
EOSINOPHIL # BLD: 0.2 K/UL (ref 0–0.4)
EOSINOPHIL NFR BLD: 2 % (ref 0–7)
ERYTHROCYTE [DISTWIDTH] IN BLOOD BY AUTOMATED COUNT: 17.9 % (ref 11.5–14.5)
GLUCOSE BLD STRIP.AUTO-MCNC: 109 MG/DL (ref 65–100)
GLUCOSE BLD STRIP.AUTO-MCNC: 116 MG/DL (ref 65–100)
GLUCOSE BLD STRIP.AUTO-MCNC: 126 MG/DL (ref 65–100)
GLUCOSE SERPL-MCNC: 83 MG/DL (ref 65–100)
HCT VFR BLD AUTO: 21.3 % (ref 35–47)
HGB BLD-MCNC: 7.3 G/DL (ref 11.5–16)
IMM GRANULOCYTES # BLD AUTO: 0 K/UL
IMM GRANULOCYTES NFR BLD AUTO: 0 %
LYMPHOCYTES # BLD: 6.9 K/UL (ref 0.8–3.5)
LYMPHOCYTES NFR BLD: 66 % (ref 12–49)
MCH RBC QN AUTO: 30.8 PG (ref 26–34)
MCHC RBC AUTO-ENTMCNC: 34.3 G/DL (ref 30–36.5)
MCV RBC AUTO: 89.9 FL (ref 80–99)
METAMYELOCYTES NFR BLD MANUAL: 1 %
MONOCYTES # BLD: 0.7 K/UL (ref 0–1)
MONOCYTES NFR BLD: 7 % (ref 5–13)
MYELOCYTES NFR BLD MANUAL: 0 %
NEUTS BAND NFR BLD MANUAL: 0 % (ref 0–6)
NEUTS SEG # BLD: 2.4 K/UL (ref 1.8–8)
NEUTS SEG NFR BLD: 23 % (ref 32–75)
NRBC # BLD: 0 K/UL (ref 0–0.01)
NRBC BLD-RTO: 0 PER 100 WBC
OTHER CELLS NFR BLD MANUAL: 0 %
PLATELET # BLD AUTO: 327 K/UL (ref 150–400)
PLATELET COMMENTS,PCOM: ABNORMAL
PMV BLD AUTO: 11.3 FL (ref 8.9–12.9)
POTASSIUM SERPL-SCNC: 3.8 MMOL/L (ref 3.5–5.1)
PROMYELOCYTES NFR BLD MANUAL: 0 %
RBC # BLD AUTO: 2.37 M/UL (ref 3.8–5.2)
RBC MORPH BLD: ABNORMAL
SERVICE CMNT-IMP: ABNORMAL
SODIUM SERPL-SCNC: 138 MMOL/L (ref 136–145)
WBC # BLD AUTO: 10.4 K/UL (ref 3.6–11)

## 2019-02-12 PROCEDURE — 74011250637 HC RX REV CODE- 250/637: Performed by: INTERNAL MEDICINE

## 2019-02-12 PROCEDURE — 80048 BASIC METABOLIC PNL TOTAL CA: CPT

## 2019-02-12 PROCEDURE — 65270000029 HC RM PRIVATE

## 2019-02-12 PROCEDURE — 85027 COMPLETE CBC AUTOMATED: CPT

## 2019-02-12 PROCEDURE — 36415 COLL VENOUS BLD VENIPUNCTURE: CPT

## 2019-02-12 PROCEDURE — 74011250636 HC RX REV CODE- 250/636: Performed by: INTERNAL MEDICINE

## 2019-02-12 PROCEDURE — 82962 GLUCOSE BLOOD TEST: CPT

## 2019-02-12 PROCEDURE — 36591 DRAW BLOOD OFF VENOUS DEVICE: CPT

## 2019-02-12 RX ORDER — SODIUM CHLORIDE 0.9 % (FLUSH) 0.9 %
10-30 SYRINGE (ML) INJECTION AS NEEDED
Status: DISCONTINUED | OUTPATIENT
Start: 2019-02-12 | End: 2019-02-26

## 2019-02-12 RX ORDER — SODIUM CHLORIDE 0.9 % (FLUSH) 0.9 %
10-40 SYRINGE (ML) INJECTION EVERY 8 HOURS
Status: DISCONTINUED | OUTPATIENT
Start: 2019-02-12 | End: 2019-02-26

## 2019-02-12 RX ADMIN — Medication 10 ML: at 15:51

## 2019-02-12 RX ADMIN — DIPHENHYDRAMINE HYDROCHLORIDE 12.5 MG: 50 INJECTION, SOLUTION INTRAMUSCULAR; INTRAVENOUS at 06:38

## 2019-02-12 RX ADMIN — DEXTROSE MONOHYDRATE, SODIUM CHLORIDE, AND POTASSIUM CHLORIDE 100 ML/HR: 50; 4.5; 1.49 INJECTION, SOLUTION INTRAVENOUS at 17:34

## 2019-02-12 RX ADMIN — Medication 10 ML: at 22:59

## 2019-02-12 RX ADMIN — PANTOPRAZOLE SODIUM 40 MG: 40 TABLET, DELAYED RELEASE ORAL at 06:45

## 2019-02-12 RX ADMIN — ONDANSETRON 2 MG: 2 INJECTION INTRAMUSCULAR; INTRAVENOUS at 18:47

## 2019-02-12 RX ADMIN — HEPARIN SODIUM 5000 UNITS: 5000 INJECTION INTRAVENOUS; SUBCUTANEOUS at 15:49

## 2019-02-12 RX ADMIN — HYDROMORPHONE HYDROCHLORIDE 1 MG: 1 INJECTION, SOLUTION INTRAMUSCULAR; INTRAVENOUS; SUBCUTANEOUS at 17:29

## 2019-02-12 RX ADMIN — Medication 10 ML: at 13:33

## 2019-02-12 RX ADMIN — Medication 40 ML: at 00:09

## 2019-02-12 RX ADMIN — HYDROMORPHONE HYDROCHLORIDE 1 MG: 1 INJECTION, SOLUTION INTRAMUSCULAR; INTRAVENOUS; SUBCUTANEOUS at 05:09

## 2019-02-12 RX ADMIN — HYDROMORPHONE HYDROCHLORIDE 1 MG: 1 INJECTION, SOLUTION INTRAMUSCULAR; INTRAVENOUS; SUBCUTANEOUS at 13:33

## 2019-02-12 RX ADMIN — HYDROMORPHONE HYDROCHLORIDE 1 MG: 1 INJECTION, SOLUTION INTRAMUSCULAR; INTRAVENOUS; SUBCUTANEOUS at 21:24

## 2019-02-12 RX ADMIN — POLYETHYLENE GLYCOL 3350 17 G: 17 POWDER, FOR SOLUTION ORAL at 09:04

## 2019-02-12 RX ADMIN — Medication 40 ML: at 06:37

## 2019-02-12 RX ADMIN — HYDROMORPHONE HYDROCHLORIDE 1 MG: 1 INJECTION, SOLUTION INTRAMUSCULAR; INTRAVENOUS; SUBCUTANEOUS at 09:05

## 2019-02-12 RX ADMIN — DIPHENHYDRAMINE HYDROCHLORIDE 12.5 MG: 50 INJECTION, SOLUTION INTRAMUSCULAR; INTRAVENOUS at 18:47

## 2019-02-12 RX ADMIN — DIPHENHYDRAMINE HYDROCHLORIDE 12.5 MG: 50 INJECTION, SOLUTION INTRAMUSCULAR; INTRAVENOUS at 12:14

## 2019-02-12 RX ADMIN — ONDANSETRON 2 MG: 2 INJECTION INTRAMUSCULAR; INTRAVENOUS at 06:38

## 2019-02-12 RX ADMIN — Medication 5 ML: at 22:00

## 2019-02-12 RX ADMIN — HEPARIN SODIUM 5000 UNITS: 5000 INJECTION INTRAVENOUS; SUBCUTANEOUS at 06:38

## 2019-02-12 RX ADMIN — DEXTROSE MONOHYDRATE, SODIUM CHLORIDE, AND POTASSIUM CHLORIDE 100 ML/HR: 50; 4.5; 1.49 INJECTION, SOLUTION INTRAVENOUS at 00:10

## 2019-02-12 RX ADMIN — CITALOPRAM HYDROBROMIDE 10 MG: 20 TABLET ORAL at 09:04

## 2019-02-12 RX ADMIN — LOSARTAN POTASSIUM 50 MG: 50 TABLET, FILM COATED ORAL at 09:04

## 2019-02-12 RX ADMIN — FOLIC ACID 1 MG: 1 TABLET ORAL at 09:04

## 2019-02-12 RX ADMIN — Medication 40 ML: at 06:46

## 2019-02-12 RX ADMIN — HEPARIN SODIUM 5000 UNITS: 5000 INJECTION INTRAVENOUS; SUBCUTANEOUS at 22:58

## 2019-02-12 RX ADMIN — ONDANSETRON 2 MG: 2 INJECTION INTRAMUSCULAR; INTRAVENOUS at 12:14

## 2019-02-12 NOTE — PROGRESS NOTES
General Surgery End of Shift Nursing Note Bedside shift change report given to Sienna Trinidad (oncoming nurse) by Francisco Whalen RN (offgoing nurse). Report included the following information SBAR, Kardex, Procedure Summary, Intake/Output, MAR and Recent Results. Shift worked:   7p-7a Summary of shift:    Central line placed d/t difficulty starting and also maintaining an IV site. Labs drawn from line, continues to receive Dilaudid for pain with satisfactory results per pt report. Benedryl and Zofran given to reduce s/e Issues for physician to address:   none Number times ambulated in hallway past shift: 0 Number of times OOB to chair past shift: 0 Pain Management: 
Current medication: Dilaudid IVP Patient states pain is manageable on current pain medication: YES 
 
GI: 
 
Current diet:  DIET REGULAR Tolerating current diet: YES Passing flatus: YES Respiratory: 
 
Incentive Spirometer at bedside: NO 
Patient instructed on use: NO 
 
Patient Safety: 
 
Falls Score: 1 Bed Alarm On? No 
Sitter?  No 
 
Raza Branch RN

## 2019-02-12 NOTE — PROCEDURES
Central Line Procedure Note    Indication: Inadequate venous access    Start:  2306  End:   2322    Risks, benefits, alternatives explained and patient agrees to proceed. Patient positioned in Trendelenburg. 7-Step Sterility Protocol followed. (cap, mask sterile gown, sterile gloves, large sterile sheet, hand hygiene, 2% chlorhexidine for cutaneous antisepsis)  5 mL 1% Lidocaine placed at insertion site. Right internal jugular cannulated x 1 attempt(s) utilizing the Seldinger technique under ultrasound guidance. Catheter secured & Biopatch applied. Sterile Tegaderm placed. CXR pending.     Care turned over to covering Attending MD.

## 2019-02-12 NOTE — PROGRESS NOTES
General Daily Progress Note Admit Date: 2/10/2019 Subjective:  
 
Patient continues to complain of pain. Current Facility-Administered Medications Medication Dose Route Frequency  [START ON 2/12/2019] losartan (COZAAR) tablet 50 mg  50 mg Oral DAILY  [START ON 2/12/2019] hydroCHLOROthiazide (HYDRODIURIL) tablet 12.5 mg  12.5 mg Oral DAILY  citalopram (CELEXA) tablet 10 mg  10 mg Oral DAILY  diphenhydrAMINE (BENADRYL) capsule 50 mg  50 mg Oral DAILY PRN  
 fentaNYL (DURAGESIC) 75 mcg/hr patch 1 Patch  1 Patch TransDERmal Q72H  folic acid (FOLVITE) tablet 1 mg  1 mg Oral DAILY  sodium chloride (NS) flush 5-40 mL  5-40 mL IntraVENous Q8H  
 sodium chloride (NS) flush 5-40 mL  5-40 mL IntraVENous PRN  
 naloxone (NARCAN) injection 0.4 mg  0.4 mg IntraVENous PRN  
 ondansetron (ZOFRAN) injection 2 mg  2 mg IntraVENous Q6H PRN  
 bisacodyl (DULCOLAX) tablet 5 mg  5 mg Oral DAILY PRN  
 nicotine (NICODERM CQ) 7 mg/24 hr patch 1 Patch  1 Patch TransDERmal DAILY PRN  
 heparin (porcine) injection 5,000 Units  5,000 Units SubCUTAneous Q8H  
 dextrose 5 % - 0.45% NaCl infusion  100 mL/hr IntraVENous CONTINUOUS  
 HYDROmorphone (PF) (DILAUDID) injection 1 mg  1 mg IntraVENous Q4H PRN  
 diphenhydrAMINE (BENADRYL) injection 12.5 mg  12.5 mg IntraVENous Q6H PRN  polyethylene glycol (MIRALAX) packet 17 g  17 g Oral DAILY  pantoprazole (PROTONIX) tablet 40 mg  40 mg Oral ACB Review of Systems A comprehensive review of systems was negative. Objective:  
 
Patient Vitals for the past 24 hrs: 
 BP Temp Pulse Resp SpO2  
02/11/19 1856 116/68 99 °F (37.2 °C) 89 18 99 % 02/11/19 1512 123/69 98.4 °F (36.9 °C) 75 18 97 % 02/11/19 1222 109/67 98.6 °F (37 °C) 77 18 99 % 02/11/19 0804 134/71 98.2 °F (36.8 °C) 80 17 98 % 02/11/19 0328 117/76 98.7 °F (37.1 °C) 77 18 100 % 02/11/19 0126 112/63 99.1 °F (37.3 °C) 86 18 99 % No intake/output data recorded. 02/10 0701 - 02/11 1900 In: 2736.7 [I.V.:2736.7] Out: - Physical Exam:  
Visit Vitals /68 Pulse 89 Temp 99 °F (37.2 °C) Resp 18 Wt 177 lb 11.1 oz (80.6 kg) SpO2 99% Breastfeeding? No  
BMI 25.50 kg/m² General appearance: alert, cooperative, no distress, appears stated age Neck: supple, symmetrical, trachea midline, no adenopathy, thyroid: not enlarged, symmetric, no tenderness/mass/nodules, no carotid bruit and no JVD Lungs: clear to auscultation bilaterally Heart: regular rate and rhythm, S1, S2 normal, no murmur, click, rub or gallop Abdomen: soft, non-tender. Bowel sounds normal. No masses,  no organomegaly Extremities: extremities normal, atraumatic, no cyanosis or edema Data Review Recent Results (from the past 24 hour(s)) METABOLIC PANEL, BASIC Collection Time: 02/11/19  3:07 AM  
Result Value Ref Range Sodium 136 136 - 145 mmol/L Potassium 3.8 3.5 - 5.1 mmol/L Chloride 107 97 - 108 mmol/L  
 CO2 24 21 - 32 mmol/L Anion gap 5 5 - 15 mmol/L Glucose 87 65 - 100 mg/dL BUN 12 6 - 20 MG/DL Creatinine 0.73 0.55 - 1.02 MG/DL  
 BUN/Creatinine ratio 16 12 - 20 GFR est AA >60 >60 ml/min/1.73m2 GFR est non-AA >60 >60 ml/min/1.73m2 Calcium 8.2 (L) 8.5 - 10.1 MG/DL  
CBC WITH AUTOMATED DIFF Collection Time: 02/11/19  3:07 AM  
Result Value Ref Range WBC 9.9 3.6 - 11.0 K/uL  
 RBC 2.54 (L) 3.80 - 5.20 M/uL HGB 7.7 (L) 11.5 - 16.0 g/dL HCT 22.9 (L) 35.0 - 47.0 % MCV 90.2 80.0 - 99.0 FL  
 MCH 30.3 26.0 - 34.0 PG  
 MCHC 33.6 30.0 - 36.5 g/dL  
 RDW 17.9 (H) 11.5 - 14.5 % PLATELET 584 171 - 388 K/uL MPV 10.7 8.9 - 12.9 FL  
 NRBC 0.2 (H) 0  WBC ABSOLUTE NRBC 0.02 (H) 0.00 - 0.01 K/uL NEUTROPHILS 23 (L) 32 - 75 % LYMPHOCYTES 63 (H) 12 - 49 % MONOCYTES 6 5 - 13 % EOSINOPHILS 8 (H) 0 - 7 % BASOPHILS 0 0 - 1 % IMMATURE GRANULOCYTES 0 0.0 - 0.5 % ABS. NEUTROPHILS 2.3 1.8 - 8.0 K/UL ABS. LYMPHOCYTES 6.2 (H) 0.8 - 3.5 K/UL  
 ABS. MONOCYTES 0.6 0.0 - 1.0 K/UL  
 ABS. EOSINOPHILS 0.8 (H) 0.0 - 0.4 K/UL  
 ABS. BASOPHILS 0.0 0.0 - 0.1 K/UL  
 ABS. IMM. GRANS. 0.0 0.00 - 0.04 K/UL  
 DF MANUAL    
 RBC COMMENTS ANISOCYTOSIS 2+ 
    
 RBC COMMENTS TARGET CELLS 2+ GLUCOSE, POC Collection Time: 02/11/19  8:07 AM  
Result Value Ref Range Glucose (POC) 112 (H) 65 - 100 mg/dL Performed by Anton Vargas GLUCOSE, POC Collection Time: 02/11/19 12:24 PM  
Result Value Ref Range Glucose (POC) 134 (H) 65 - 100 mg/dL Performed by Anton Vargas GLUCOSE, POC Collection Time: 02/11/19  4:18 PM  
Result Value Ref Range Glucose (POC) 101 (H) 65 - 100 mg/dL Performed by Carlos Zhang) Assessment:  
 
Principal Problem: 
  Sickle cell disease ([facility] (2/10/2019) Active Problems: 
  Sick[facility] ([facility] (10/3/2014) Hypertension (10/3/2014) Hepatitis C (10/3/2014) Depression (10/5/2014) Sickle cell pain crisis ([facility] (9/8/2018) Plan: 1. Patient continues with pain from a sickle cell crises.

## 2019-02-12 NOTE — PROGRESS NOTES
General Surgery End of Shift Nursing Note Bedside shift change report given to Silvia Aj RN (oncoming nurse) by Sia Beatty RN (offgoing nurse). Report included the following information SBAR. Shift worked:   7am-7pm  
Summary of shift:    Patient in bed resting Given Dilaudid, Zofran and Benadryl Issues for physician to address:     
 
Number times ambulated in hallway past shift: 0 Number of times OOB to chair past shift: 2 Pain Management: 
Current medication: Dilaudid Patient states pain is manageable on current pain medication: YES 
 
GI: 
 
Current diet:  DIET REGULAR Tolerating current diet: YES Passing flatus: NO 
Last Bowel Movement: several days ago Appearance:  
 
Respiratory: 
 
Incentive Spirometer at bedside: YES Patient instructed on use: YES Patient Safety: 
 
Falls Score: 1 Bed Alarm On? Not applicable Sitter? Not applicable Sia Beatty

## 2019-02-13 PROCEDURE — 74011250637 HC RX REV CODE- 250/637: Performed by: INTERNAL MEDICINE

## 2019-02-13 PROCEDURE — 74011250636 HC RX REV CODE- 250/636: Performed by: INTERNAL MEDICINE

## 2019-02-13 PROCEDURE — 94761 N-INVAS EAR/PLS OXIMETRY MLT: CPT

## 2019-02-13 PROCEDURE — 65270000029 HC RM PRIVATE

## 2019-02-13 PROCEDURE — 94762 N-INVAS EAR/PLS OXIMTRY CONT: CPT

## 2019-02-13 RX ORDER — HYDROMORPHONE HYDROCHLORIDE 1 MG/ML
1 INJECTION, SOLUTION INTRAMUSCULAR; INTRAVENOUS; SUBCUTANEOUS
Status: DISCONTINUED | OUTPATIENT
Start: 2019-02-13 | End: 2019-02-27

## 2019-02-13 RX ADMIN — DIPHENHYDRAMINE HYDROCHLORIDE 12.5 MG: 50 INJECTION, SOLUTION INTRAMUSCULAR; INTRAVENOUS at 03:16

## 2019-02-13 RX ADMIN — HYDROMORPHONE HYDROCHLORIDE 1 MG: 1 INJECTION, SOLUTION INTRAMUSCULAR; INTRAVENOUS; SUBCUTANEOUS at 06:20

## 2019-02-13 RX ADMIN — HYDROMORPHONE HYDROCHLORIDE 1 MG: 1 INJECTION, SOLUTION INTRAMUSCULAR; INTRAVENOUS; SUBCUTANEOUS at 12:54

## 2019-02-13 RX ADMIN — ONDANSETRON 2 MG: 2 INJECTION INTRAMUSCULAR; INTRAVENOUS at 16:54

## 2019-02-13 RX ADMIN — HYDROMORPHONE HYDROCHLORIDE 1 MG: 1 INJECTION, SOLUTION INTRAMUSCULAR; INTRAVENOUS; SUBCUTANEOUS at 02:33

## 2019-02-13 RX ADMIN — ONDANSETRON 2 MG: 2 INJECTION INTRAMUSCULAR; INTRAVENOUS at 23:11

## 2019-02-13 RX ADMIN — POLYETHYLENE GLYCOL 3350 17 G: 17 POWDER, FOR SOLUTION ORAL at 09:10

## 2019-02-13 RX ADMIN — ONDANSETRON 2 MG: 2 INJECTION INTRAMUSCULAR; INTRAVENOUS at 09:58

## 2019-02-13 RX ADMIN — DIPHENHYDRAMINE HYDROCHLORIDE 12.5 MG: 50 INJECTION, SOLUTION INTRAMUSCULAR; INTRAVENOUS at 23:11

## 2019-02-13 RX ADMIN — HYDROMORPHONE HYDROCHLORIDE 1 MG: 1 INJECTION, SOLUTION INTRAMUSCULAR; INTRAVENOUS; SUBCUTANEOUS at 20:09

## 2019-02-13 RX ADMIN — PANTOPRAZOLE SODIUM 40 MG: 40 TABLET, DELAYED RELEASE ORAL at 09:10

## 2019-02-13 RX ADMIN — ONDANSETRON 2 MG: 2 INJECTION INTRAMUSCULAR; INTRAVENOUS at 03:16

## 2019-02-13 RX ADMIN — DIPHENHYDRAMINE HYDROCHLORIDE 12.5 MG: 50 INJECTION, SOLUTION INTRAMUSCULAR; INTRAVENOUS at 09:56

## 2019-02-13 RX ADMIN — DIPHENHYDRAMINE HYDROCHLORIDE 12.5 MG: 50 INJECTION, SOLUTION INTRAMUSCULAR; INTRAVENOUS at 16:54

## 2019-02-13 RX ADMIN — CITALOPRAM HYDROBROMIDE 10 MG: 20 TABLET ORAL at 09:11

## 2019-02-13 RX ADMIN — HYDROMORPHONE HYDROCHLORIDE 1 MG: 1 INJECTION, SOLUTION INTRAMUSCULAR; INTRAVENOUS; SUBCUTANEOUS at 23:11

## 2019-02-13 RX ADMIN — Medication 30 ML: at 13:03

## 2019-02-13 RX ADMIN — HEPARIN SODIUM 5000 UNITS: 5000 INJECTION INTRAVENOUS; SUBCUTANEOUS at 06:21

## 2019-02-13 RX ADMIN — Medication 10 ML: at 21:03

## 2019-02-13 RX ADMIN — HEPARIN SODIUM 5000 UNITS: 5000 INJECTION INTRAVENOUS; SUBCUTANEOUS at 21:01

## 2019-02-13 RX ADMIN — HYDROMORPHONE HYDROCHLORIDE 1 MG: 1 INJECTION, SOLUTION INTRAMUSCULAR; INTRAVENOUS; SUBCUTANEOUS at 09:55

## 2019-02-13 RX ADMIN — LOSARTAN POTASSIUM 50 MG: 50 TABLET, FILM COATED ORAL at 09:12

## 2019-02-13 RX ADMIN — Medication 10 ML: at 06:23

## 2019-02-13 RX ADMIN — DEXTROSE MONOHYDRATE, SODIUM CHLORIDE, AND POTASSIUM CHLORIDE 100 ML/HR: 50; 4.5; 1.49 INJECTION, SOLUTION INTRAVENOUS at 07:20

## 2019-02-13 RX ADMIN — FOLIC ACID 1 MG: 1 TABLET ORAL at 09:12

## 2019-02-13 RX ADMIN — HYDROMORPHONE HYDROCHLORIDE 1 MG: 1 INJECTION, SOLUTION INTRAMUSCULAR; INTRAVENOUS; SUBCUTANEOUS at 16:55

## 2019-02-13 RX ADMIN — HEPARIN SODIUM 5000 UNITS: 5000 INJECTION INTRAVENOUS; SUBCUTANEOUS at 14:38

## 2019-02-13 NOTE — PROGRESS NOTES
General Daily Progress Note Admit Date: 2/10/2019 Subjective:  
 
Patient continues to complain of pain. Current Facility-Administered Medications Medication Dose Route Frequency  sodium chloride (NS) flush 10-30 mL  10-30 mL InterCATHeter PRN  
 sodium chloride (NS) flush 10-40 mL  10-40 mL InterCATHeter Q8H  
 losartan (COZAAR) tablet 50 mg  50 mg Oral DAILY  dextrose 5% - 0.45% NaCl with KCl 20 mEq/L infusion  100 mL/hr IntraVENous CONTINUOUS  
 citalopram (CELEXA) tablet 10 mg  10 mg Oral DAILY  diphenhydrAMINE (BENADRYL) capsule 50 mg  50 mg Oral DAILY PRN  
 fentaNYL (DURAGESIC) 75 mcg/hr patch 1 Patch  1 Patch TransDERmal Q72H  folic acid (FOLVITE) tablet 1 mg  1 mg Oral DAILY  sodium chloride (NS) flush 5-40 mL  5-40 mL IntraVENous Q8H  
 sodium chloride (NS) flush 5-40 mL  5-40 mL IntraVENous PRN  
 naloxone (NARCAN) injection 0.4 mg  0.4 mg IntraVENous PRN  
 ondansetron (ZOFRAN) injection 2 mg  2 mg IntraVENous Q6H PRN  
 bisacodyl (DULCOLAX) tablet 5 mg  5 mg Oral DAILY PRN  
 nicotine (NICODERM CQ) 7 mg/24 hr patch 1 Patch  1 Patch TransDERmal DAILY PRN  
 heparin (porcine) injection 5,000 Units  5,000 Units SubCUTAneous Q8H  
 HYDROmorphone (PF) (DILAUDID) injection 1 mg  1 mg IntraVENous Q4H PRN  
 diphenhydrAMINE (BENADRYL) injection 12.5 mg  12.5 mg IntraVENous Q6H PRN  polyethylene glycol (MIRALAX) packet 17 g  17 g Oral DAILY  pantoprazole (PROTONIX) tablet 40 mg  40 mg Oral ACB Review of Systems A comprehensive review of systems was negative. Objective:  
 
Patient Vitals for the past 24 hrs: 
 BP Temp Pulse Resp SpO2  
02/13/19 0810 115/56 98.9 °F (37.2 °C) 82 19 96 % 02/13/19 0448 97/70 99.1 °F (37.3 °C) 81 16 100 % 02/13/19 0002 126/63 99.7 °F (37.6 °C) 80 18 99 % 02/12/19 1845 117/64 99.3 °F (37.4 °C) 89 18 99 % 02/12/19 1523 115/67 99.6 °F (37.6 °C) 80 18 100 % 02/12/19 1141 125/56 99 °F (37.2 °C) 81 18 99 % No intake/output data recorded. 02/11 1901 - 02/13 0700 In: 3531.7 [P.O.:450; I.V.:3081.7] Out: 700 [Urine:700] Physical Exam:  
Visit Vitals /56 (BP 1 Location: Left arm, BP Patient Position: At rest) Pulse 82 Temp 98.9 °F (37.2 °C) Resp 19 Wt 177 lb 11.1 oz (80.6 kg) SpO2 96% Breastfeeding? No  
BMI 25.50 kg/m² General appearance: alert, cooperative, no distress, appears stated age Neck: supple, symmetrical, trachea midline, no adenopathy, thyroid: not enlarged, symmetric, no tenderness/mass/nodules, no carotid bruit and no JVD Lungs: clear to auscultation bilaterally Heart: regular rate and rhythm, S1, S2 normal, no murmur, click, rub or gallop Abdomen: soft, non-tender. Bowel sounds normal. No masses,  no organomegaly Extremities: extremities normal, atraumatic, no cyanosis or edema Data Review Recent Results (from the past 24 hour(s)) GLUCOSE, POC Collection Time: 02/12/19 11:44 AM  
Result Value Ref Range Glucose (POC) 126 (H) 65 - 100 mg/dL Performed by Jozef Grullon) GLUCOSE, POC Collection Time: 02/12/19  4:43 PM  
Result Value Ref Range Glucose (POC) 116 (H) 65 - 100 mg/dL Performed by Jozef Grullon) Assessment:  
 
Principal Problem: 
  Sickle [facility] disease ([facility] (2/10/2019) Active Problems: 
  Sickle cell anemia ([facility] (10/3/2014) Hypertension (10/3/2014) Hepatitis C (10/3/2014) Depression (10/5/2014) Sickle cell pain crisis ([facility] (9/8/2018) Plan: 1. Continue treatment of painful crises. She is reasonably comfortable for now. 2.  Await labs for tomorrow.

## 2019-02-13 NOTE — PROGRESS NOTES
General Daily Progress Note Admit Date: 2/10/2019 Subjective:  
 
Patient continues to complain of pain. Current Facility-Administered Medications Medication Dose Route Frequency  sodium chloride (NS) flush 10-30 mL  10-30 mL InterCATHeter PRN  
 sodium chloride (NS) flush 10-40 mL  10-40 mL InterCATHeter Q8H  
 losartan (COZAAR) tablet 50 mg  50 mg Oral DAILY  dextrose 5% - 0.45% NaCl with KCl 20 mEq/L infusion  100 mL/hr IntraVENous CONTINUOUS  
 citalopram (CELEXA) tablet 10 mg  10 mg Oral DAILY  diphenhydrAMINE (BENADRYL) capsule 50 mg  50 mg Oral DAILY PRN  
 fentaNYL (DURAGESIC) 75 mcg/hr patch 1 Patch  1 Patch TransDERmal Q72H  folic acid (FOLVITE) tablet 1 mg  1 mg Oral DAILY  sodium chloride (NS) flush 5-40 mL  5-40 mL IntraVENous Q8H  
 sodium chloride (NS) flush 5-40 mL  5-40 mL IntraVENous PRN  
 naloxone (NARCAN) injection 0.4 mg  0.4 mg IntraVENous PRN  
 ondansetron (ZOFRAN) injection 2 mg  2 mg IntraVENous Q6H PRN  
 bisacodyl (DULCOLAX) tablet 5 mg  5 mg Oral DAILY PRN  
 nicotine (NICODERM CQ) 7 mg/24 hr patch 1 Patch  1 Patch TransDERmal DAILY PRN  
 heparin (porcine) injection 5,000 Units  5,000 Units SubCUTAneous Q8H  
 HYDROmorphone (PF) (DILAUDID) injection 1 mg  1 mg IntraVENous Q4H PRN  
 diphenhydrAMINE (BENADRYL) injection 12.5 mg  12.5 mg IntraVENous Q6H PRN  polyethylene glycol (MIRALAX) packet 17 g  17 g Oral DAILY  pantoprazole (PROTONIX) tablet 40 mg  40 mg Oral ACB Review of Systems A comprehensive review of systems was negative. Objective:  
 
Patient Vitals for the past 24 hrs: 
 BP Temp Pulse Resp SpO2  
02/12/19 1845 117/64 99.3 °F (37.4 °C) 89 18 99 % 02/12/19 1523 115/67 99.6 °F (37.6 °C) 80 18 100 % 02/12/19 1141 125/56 99 °F (37.2 °C) 81 18 99 % 02/12/19 0730 122/61 99.3 °F (37.4 °C) 84 18 100 % 02/12/19 0450 110/69 99.1 °F (37.3 °C) 80 18 94 % 02/12/19 0016 125/76 99 °F (37.2 °C) 78 18 99 % 02/11/19 2326 122/67  85  100 % 02/11/19 2312 122/67  83  100 % 02/11/19 2308 120/66  85  100 % No intake/output data recorded. 02/11 0701 - 02/12 1900 In: 3043.3 [I.V.:3043.3] Out: - Physical Exam:  
Visit Vitals /64 Pulse 89 Temp 99.3 °F (37.4 °C) Resp 18 Wt 177 lb 11.1 oz (80.6 kg) SpO2 99% Breastfeeding? No  
BMI 25.50 kg/m² General appearance: alert, cooperative, no distress, appears stated age Neck: supple, symmetrical, trachea midline, no adenopathy, thyroid: not enlarged, symmetric, no tenderness/mass/nodules, no carotid bruit and no JVD Lungs: clear to auscultation bilaterally Heart: regular rate and rhythm, S1, S2 normal, no murmur, click, rub or gallop Abdomen: soft, non-tender. Bowel sounds normal. No masses,  no organomegaly Extremities: extremities normal, atraumatic, no cyanosis or edema Data Review Recent Results (from the past 24 hour(s)) CBC WITH MANUAL DIFF Collection Time: 02/12/19  5:03 AM  
Result Value Ref Range WBC 10.4 3.6 - 11.0 K/uL  
 RBC 2.37 (L) 3.80 - 5.20 M/uL HGB 7.3 (L) 11.5 - 16.0 g/dL HCT 21.3 (L) 35.0 - 47.0 % MCV 89.9 80.0 - 99.0 FL  
 MCH 30.8 26.0 - 34.0 PG  
 MCHC 34.3 30.0 - 36.5 g/dL  
 RDW 17.9 (H) 11.5 - 14.5 % PLATELET 496 368 - 857 K/uL MPV 11.3 8.9 - 12.9 FL  
 NRBC 0.0 0  WBC ABSOLUTE NRBC 0.00 0.00 - 0.01 K/uL NEUTROPHILS 23 (L) 32 - 75 % BAND NEUTROPHILS 0 0 - 6 % LYMPHOCYTES 66 (H) 12 - 49 % MONOCYTES 7 5 - 13 % EOSINOPHILS 2 0 - 7 % BASOPHILS 1 0 - 1 % METAMYELOCYTES 1 (H) 0 % MYELOCYTES 0 0 % PROMYELOCYTES 0 0 % BLASTS 0 0 % OTHER CELL 0 0 IMMATURE GRANULOCYTES 0 %  
 ABS. NEUTROPHILS 2.4 1.8 - 8.0 K/UL  
 ABS. LYMPHOCYTES 6.9 (H) 0.8 - 3.5 K/UL  
 ABS. MONOCYTES 0.7 0.0 - 1.0 K/UL  
 ABS. EOSINOPHILS 0.2 0.0 - 0.4 K/UL  
 ABS. BASOPHILS 0.1 0.0 - 0.1 K/UL  
 ABS. IMM. GRANS. 0.0 K/UL  
 DF MANUAL PLATELET COMMENTS Large Platelets RBC COMMENTS ANISOCYTOSIS 1+ 
    
 RBC COMMENTS SICKLE CELLS 
PRESENT 
    
 RBC COMMENTS TARGET CELLS 3+ METABOLIC PANEL, BASIC Collection Time: 02/12/19  5:03 AM  
Result Value Ref Range Sodium 138 136 - 145 mmol/L Potassium 3.8 3.5 - 5.1 mmol/L Chloride 105 97 - 108 mmol/L  
 CO2 29 21 - 32 mmol/L Anion gap 4 (L) 5 - 15 mmol/L Glucose 83 65 - 100 mg/dL BUN 9 6 - 20 MG/DL Creatinine 0.66 0.55 - 1.02 MG/DL  
 BUN/Creatinine ratio 14 12 - 20 GFR est AA >60 >60 ml/min/1.73m2 GFR est non-AA >60 >60 ml/min/1.73m2 Calcium 8.4 (L) 8.5 - 10.1 MG/DL  
GLUCOSE, POC Collection Time: 02/12/19  7:37 AM  
Result Value Ref Range Glucose (POC) 109 (H) 65 - 100 mg/dL Performed by Jamir Watkins) GLUCOSE, POC Collection Time: 02/12/19 11:44 AM  
Result Value Ref Range Glucose (POC) 126 (H) 65 - 100 mg/dL Performed by Waypatrick Critchley) GLUCOSE, POC Collection Time: 02/12/19  4:43 PM  
Result Value Ref Range Glucose (POC) 116 (H) 65 - 100 mg/dL Performed by Waypatrick Critchley) Assessment:  
 
Principal Problem: 
  Sickle cell disease ([facility] (2/10/2019) Active Problems: 
  Sickle cell anemia ([facility] (10/3/2014) Hypertension (10/3/2014) Hepatitis C (10/3/2014) Depression (10/5/2014) Sickle cell pain crisis ([facility] (9/8/2018) Plan: 1. Continue treatment of painful crises.

## 2019-02-13 NOTE — PROGRESS NOTES
Bedside and Verbal shift change report given to [address]  (oncoming nurse) by [id number]  (offgoing nurse). Report included the following information SBAR, Kardex, Intake/Output and MAR.  
0730 blood Sugar D/C per Dr Comfort Jiménez and continuous O2   
1930 Bedside and Verbal shift change report given to [address] (oncoming nurse) by Michael Hernandez RN  (offgoing nurse). Report included the following information SBAR, Kardex and MAR. Progress Note Patient: Jocelyne Hull MRN: [id number]  SSN: [id number] YOB: 1960  Age: 66 y.o. Sex: female Admit Date: 2/10/2019 LOS: 3 days Objective:  
 
Vitals:  
 02/12/19 1523 02/12/19 1845 02/13/19 0002 02/13/19 0448 BP: 115/67 117/64 126/63 97/70 Pulse: 80 89 80 81 Resp: 18 18 18 16 Temp: 99.6 °F (37.6 °C) 99.3 °F (37.4 °C) 99.7 °F (37.6 °C) 99.1 °F (37.3 °C) SpO2: 100% 99% 99% 100% Weight:      
  
 
Intake and Output: 
Current Shift: No intake/output data recorded. Last three shifts: 02/11 1901 - 02/13 0700 In: 3531.7 [P.O.:450; I.V.:3081.7] Out: 700 [Urine:700] Lab/Data Review: All lab results for the last 24 hours reviewed. Assessment:  
 
Principal Problem: 
  Sickle cell disease ([facility] (2/10/2019) Active Problems: 
  Sickle cell anemia ([facility] (10/3/2014) Hypertension (10/3/2014) Hepatitis C (10/3/2014) Depression (10/5/2014) Sickle cell pain crisis ([facility] (9/8/2018) Plan:  
Pain management Signed By: Ryan Sotelo RN February 13, 2019

## 2019-02-14 LAB
ANION GAP SERPL CALC-SCNC: 4 MMOL/L (ref 5–15)
BASOPHILS # BLD: 0.2 K/UL (ref 0–0.1)
BASOPHILS NFR BLD: 2 % (ref 0–1)
BLASTS NFR BLD MANUAL: 0 %
BUN SERPL-MCNC: 8 MG/DL (ref 6–20)
BUN/CREAT SERPL: 12 (ref 12–20)
CALCIUM SERPL-MCNC: 8.5 MG/DL (ref 8.5–10.1)
CHLORIDE SERPL-SCNC: 106 MMOL/L (ref 97–108)
CO2 SERPL-SCNC: 28 MMOL/L (ref 21–32)
CREAT SERPL-MCNC: 0.66 MG/DL (ref 0.55–1.02)
DIFFERENTIAL METHOD BLD: ABNORMAL
EOSINOPHIL # BLD: 1 K/UL (ref 0–0.4)
EOSINOPHIL NFR BLD: 9 % (ref 0–7)
ERYTHROCYTE [DISTWIDTH] IN BLOOD BY AUTOMATED COUNT: 18.4 % (ref 11.5–14.5)
GLUCOSE SERPL-MCNC: 86 MG/DL (ref 65–100)
HCT VFR BLD AUTO: 21.7 % (ref 35–47)
HGB BLD-MCNC: 7.4 G/DL (ref 11.5–16)
IMM GRANULOCYTES # BLD AUTO: 0 K/UL
IMM GRANULOCYTES NFR BLD AUTO: 0 %
LYMPHOCYTES # BLD: 6.1 K/UL (ref 0.8–3.5)
LYMPHOCYTES NFR BLD: 57 % (ref 12–49)
MCH RBC QN AUTO: 31.1 PG (ref 26–34)
MCHC RBC AUTO-ENTMCNC: 34.1 G/DL (ref 30–36.5)
MCV RBC AUTO: 91.2 FL (ref 80–99)
METAMYELOCYTES NFR BLD MANUAL: 0 %
MONOCYTES # BLD: 0.6 K/UL (ref 0–1)
MONOCYTES NFR BLD: 6 % (ref 5–13)
MYELOCYTES NFR BLD MANUAL: 0 %
NEUTS BAND NFR BLD MANUAL: 0 % (ref 0–6)
NEUTS SEG # BLD: 2.8 K/UL (ref 1.8–8)
NEUTS SEG NFR BLD: 26 % (ref 32–75)
NRBC # BLD: 0.05 K/UL (ref 0–0.01)
NRBC BLD-RTO: 0.5 PER 100 WBC
OTHER CELLS NFR BLD MANUAL: 0 %
PLATELET # BLD AUTO: 341 K/UL (ref 150–400)
PMV BLD AUTO: 11 FL (ref 8.9–12.9)
POTASSIUM SERPL-SCNC: 4.2 MMOL/L (ref 3.5–5.1)
PROMYELOCYTES NFR BLD MANUAL: 0 %
RBC # BLD AUTO: 2.38 M/UL (ref 3.8–5.2)
RBC MORPH BLD: ABNORMAL
SODIUM SERPL-SCNC: 138 MMOL/L (ref 136–145)
WBC # BLD AUTO: 10.7 K/UL (ref 3.6–11)

## 2019-02-14 PROCEDURE — 74011250637 HC RX REV CODE- 250/637: Performed by: INTERNAL MEDICINE

## 2019-02-14 PROCEDURE — 65270000029 HC RM PRIVATE

## 2019-02-14 PROCEDURE — 80048 BASIC METABOLIC PNL TOTAL CA: CPT

## 2019-02-14 PROCEDURE — 36415 COLL VENOUS BLD VENIPUNCTURE: CPT

## 2019-02-14 PROCEDURE — 74011250636 HC RX REV CODE- 250/636: Performed by: INTERNAL MEDICINE

## 2019-02-14 PROCEDURE — 85027 COMPLETE CBC AUTOMATED: CPT

## 2019-02-14 RX ADMIN — LOSARTAN POTASSIUM 50 MG: 50 TABLET, FILM COATED ORAL at 09:33

## 2019-02-14 RX ADMIN — HEPARIN SODIUM 5000 UNITS: 5000 INJECTION INTRAVENOUS; SUBCUTANEOUS at 22:35

## 2019-02-14 RX ADMIN — DIPHENHYDRAMINE HYDROCHLORIDE 12.5 MG: 50 INJECTION, SOLUTION INTRAMUSCULAR; INTRAVENOUS at 19:24

## 2019-02-14 RX ADMIN — ONDANSETRON 2 MG: 2 INJECTION INTRAMUSCULAR; INTRAVENOUS at 05:40

## 2019-02-14 RX ADMIN — HYDROMORPHONE HYDROCHLORIDE 1 MG: 1 INJECTION, SOLUTION INTRAMUSCULAR; INTRAVENOUS; SUBCUTANEOUS at 05:39

## 2019-02-14 RX ADMIN — HYDROMORPHONE HYDROCHLORIDE 1 MG: 1 INJECTION, SOLUTION INTRAMUSCULAR; INTRAVENOUS; SUBCUTANEOUS at 12:19

## 2019-02-14 RX ADMIN — DEXTROSE MONOHYDRATE, SODIUM CHLORIDE, AND POTASSIUM CHLORIDE 100 ML/HR: 50; 4.5; 1.49 INJECTION, SOLUTION INTRAVENOUS at 02:55

## 2019-02-14 RX ADMIN — HYDROMORPHONE HYDROCHLORIDE 1 MG: 1 INJECTION, SOLUTION INTRAMUSCULAR; INTRAVENOUS; SUBCUTANEOUS at 02:55

## 2019-02-14 RX ADMIN — HYDROMORPHONE HYDROCHLORIDE 1 MG: 1 INJECTION, SOLUTION INTRAMUSCULAR; INTRAVENOUS; SUBCUTANEOUS at 15:42

## 2019-02-14 RX ADMIN — DIPHENHYDRAMINE HYDROCHLORIDE 12.5 MG: 50 INJECTION, SOLUTION INTRAMUSCULAR; INTRAVENOUS at 12:19

## 2019-02-14 RX ADMIN — HEPARIN SODIUM 5000 UNITS: 5000 INJECTION INTRAVENOUS; SUBCUTANEOUS at 05:39

## 2019-02-14 RX ADMIN — Medication 10 ML: at 22:35

## 2019-02-14 RX ADMIN — Medication 20 ML: at 05:41

## 2019-02-14 RX ADMIN — DEXTROSE MONOHYDRATE, SODIUM CHLORIDE, AND POTASSIUM CHLORIDE 100 ML/HR: 50; 4.5; 1.49 INJECTION, SOLUTION INTRAVENOUS at 23:58

## 2019-02-14 RX ADMIN — DEXTROSE MONOHYDRATE, SODIUM CHLORIDE, AND POTASSIUM CHLORIDE 100 ML/HR: 50; 4.5; 1.49 INJECTION, SOLUTION INTRAVENOUS at 13:19

## 2019-02-14 RX ADMIN — FOLIC ACID 1 MG: 1 TABLET ORAL at 09:33

## 2019-02-14 RX ADMIN — ONDANSETRON 2 MG: 2 INJECTION INTRAMUSCULAR; INTRAVENOUS at 19:24

## 2019-02-14 RX ADMIN — HYDROMORPHONE HYDROCHLORIDE 1 MG: 1 INJECTION, SOLUTION INTRAMUSCULAR; INTRAVENOUS; SUBCUTANEOUS at 22:35

## 2019-02-14 RX ADMIN — HYDROMORPHONE HYDROCHLORIDE 1 MG: 1 INJECTION, SOLUTION INTRAMUSCULAR; INTRAVENOUS; SUBCUTANEOUS at 19:24

## 2019-02-14 RX ADMIN — PANTOPRAZOLE SODIUM 40 MG: 40 TABLET, DELAYED RELEASE ORAL at 09:33

## 2019-02-14 RX ADMIN — DIPHENHYDRAMINE HYDROCHLORIDE 12.5 MG: 50 INJECTION, SOLUTION INTRAMUSCULAR; INTRAVENOUS at 05:40

## 2019-02-14 RX ADMIN — ONDANSETRON 2 MG: 2 INJECTION INTRAMUSCULAR; INTRAVENOUS at 12:18

## 2019-02-14 RX ADMIN — Medication 10 ML: at 15:43

## 2019-02-14 RX ADMIN — HEPARIN SODIUM 5000 UNITS: 5000 INJECTION INTRAVENOUS; SUBCUTANEOUS at 13:20

## 2019-02-14 RX ADMIN — CITALOPRAM HYDROBROMIDE 10 MG: 20 TABLET ORAL at 09:33

## 2019-02-14 RX ADMIN — HYDROMORPHONE HYDROCHLORIDE 1 MG: 1 INJECTION, SOLUTION INTRAMUSCULAR; INTRAVENOUS; SUBCUTANEOUS at 09:33

## 2019-02-14 RX ADMIN — POLYETHYLENE GLYCOL 3350 17 G: 17 POWDER, FOR SOLUTION ORAL at 09:36

## 2019-02-14 RX ADMIN — Medication 10 ML: at 05:41

## 2019-02-14 NOTE — PROGRESS NOTES
General Surgery End of Shift Nursing Note Bedside shift change report given to [facility] (oncoming nurse) by Mera Pearce RN  (offgoing nurse). Report included the following information SBAR. Shift worked:   7am-7pm  
Summary of shift:   Patient in bed watching television Goal is pain control Issues for physician to address:     
 
Number times ambulated in hallway past shift: 0 Number of times OOB to chair past shift: 0 Pain Management: 
Current medication: Dilaudid Patient states pain is manageable on current pain medication: YES 
 
GI: 
 
Current diet:  DIET REGULAR Tolerating current diet: YES Passing flatus: YES Last Bowel Movement: several days ago Appearance:  
 
Respiratory: 
 
Incentive Spirometer at bedside: YES Patient instructed on use: YES Patient Safety: 
 
Falls Score: 1 Bed Alarm On? Not applicable Sitter? Not applicable Mera Pearce

## 2019-02-14 NOTE — PROGRESS NOTES
General Daily Progress Note Admit Date: 2/10/2019 Subjective:  
 
Patient continues to complain of pain. Current Facility-Administered Medications Medication Dose Route Frequency  HYDROmorphone (PF) (DILAUDID) injection 1 mg  1 mg IntraVENous Q3H PRN  
 sodium chloride (NS) flush 10-30 mL  10-30 mL InterCATHeter PRN  
 sodium chloride (NS) flush 10-40 mL  10-40 mL InterCATHeter Q8H  
 losartan (COZAAR) tablet 50 mg  50 mg Oral DAILY  dextrose 5% - 0.45% NaCl with KCl 20 mEq/L infusion  100 mL/hr IntraVENous CONTINUOUS  
 citalopram (CELEXA) tablet 10 mg  10 mg Oral DAILY  diphenhydrAMINE (BENADRYL) capsule 50 mg  50 mg Oral DAILY PRN  
 fentaNYL (DURAGESIC) 75 mcg/hr patch 1 Patch  1 Patch TransDERmal Q72H  folic acid (FOLVITE) tablet 1 mg  1 mg Oral DAILY  sodium chloride (NS) flush 5-40 mL  5-40 mL IntraVENous Q8H  
 sodium chloride (NS) flush 5-40 mL  5-40 mL IntraVENous PRN  
 naloxone (NARCAN) injection 0.4 mg  0.4 mg IntraVENous PRN  
 ondansetron (ZOFRAN) injection 2 mg  2 mg IntraVENous Q6H PRN  
 bisacodyl (DULCOLAX) tablet 5 mg  5 mg Oral DAILY PRN  
 nicotine (NICODERM CQ) 7 mg/24 hr patch 1 Patch  1 Patch TransDERmal DAILY PRN  
 heparin (porcine) injection 5,000 Units  5,000 Units SubCUTAneous Q8H  
 diphenhydrAMINE (BENADRYL) injection 12.5 mg  12.5 mg IntraVENous Q6H PRN  polyethylene glycol (MIRALAX) packet 17 g  17 g Oral DAILY  pantoprazole (PROTONIX) tablet 40 mg  40 mg Oral ACB Review of Systems A comprehensive review of systems was negative. Objective:  
 
Patient Vitals for the past 24 hrs: 
 BP Temp Pulse Resp SpO2  
02/14/19 0856 137/71 98.5 °F (36.9 °C) 76 18 98 % 02/14/19 0243 125/71 98.8 °F (37.1 °C) 92 18 98 % 02/14/19 0033 113/65 98.3 °F (36.8 °C) 92 16 97 % 02/13/19 1932 124/69 99.6 °F (37.6 °C) 84 18 99 % 02/13/19 1526 129/69 99.2 °F (37.3 °C) 91 18 98 % 02/13/19 1116 109/44 98.7 °F (37.1 °C) 89 19  No intake/output data recorded. 02/12 1901 - 02/14 0700 In: 4421.7 [P.O.:2030; I.V.:2391.7] Out: 1400 [GVHTS:2048] Physical Exam:  
Visit Vitals /71 Pulse 76 Temp 98.5 °F (36.9 °C) Resp 18 Wt 177 lb 11.1 oz (80.6 kg) SpO2 98% Breastfeeding? No  
BMI 25.50 kg/m² General appearance: alert, cooperative, no distress, appears stated age Neck: supple, symmetrical, trachea midline, no adenopathy, thyroid: not enlarged, symmetric, no tenderness/mass/nodules, no carotid bruit and no JVD Lungs: clear to auscultation bilaterally Heart: regular rate and rhythm, S1, S2 normal, no murmur, click, rub or gallop Abdomen: soft, non-tender. Bowel sounds normal. No masses,  no organomegaly Extremities: extremities normal, atraumatic, no cyanosis or edema Data Review Recent Results (from the past 24 hour(s)) CBC WITH MANUAL DIFF Collection Time: 02/14/19  2:56 AM  
Result Value Ref Range WBC 10.7 3.6 - 11.0 K/uL  
 RBC 2.38 (L) 3.80 - 5.20 M/uL HGB 7.4 (L) 11.5 - 16.0 g/dL HCT 21.7 (L) 35.0 - 47.0 % MCV 91.2 80.0 - 99.0 FL  
 MCH 31.1 26.0 - 34.0 PG  
 MCHC 34.1 30.0 - 36.5 g/dL  
 RDW 18.4 (H) 11.5 - 14.5 % PLATELET 058 470 - 687 K/uL MPV 11.0 8.9 - 12.9 FL  
 NRBC 0.5 (H) 0  WBC ABSOLUTE NRBC 0.05 (H) 0.00 - 0.01 K/uL NEUTROPHILS 26 (L) 32 - 75 % BAND NEUTROPHILS 0 0 - 6 % LYMPHOCYTES 57 (H) 12 - 49 % MONOCYTES 6 5 - 13 % EOSINOPHILS 9 (H) 0 - 7 % BASOPHILS 2 (H) 0 - 1 % METAMYELOCYTES 0 0 % MYELOCYTES 0 0 % PROMYELOCYTES 0 0 % BLASTS 0 0 % OTHER CELL 0 0 IMMATURE GRANULOCYTES 0 %  
 ABS. NEUTROPHILS 2.8 1.8 - 8.0 K/UL  
 ABS. LYMPHOCYTES 6.1 (H) 0.8 - 3.5 K/UL  
 ABS. MONOCYTES 0.6 0.0 - 1.0 K/UL  
 ABS. EOSINOPHILS 1.0 (H) 0.0 - 0.4 K/UL  
 ABS. BASOPHILS 0.2 (H) 0.0 - 0.1 K/UL  
 ABS. IMM. GRANS. 0.0 K/UL  
 DF MANUAL    
 RBC COMMENTS ANISOCYTOSIS 1+ 
    
 RBC COMMENTS TARGET CELLS 2+ 
    
 RBC COMMENTS STOMATOCYTES 1+ RBC COMMENTS SICKLE CELLS 
PRESENT 
    
METABOLIC PANEL, BASIC Collection Time: 02/14/19  2:56 AM  
Result Value Ref Range Sodium 138 136 - 145 mmol/L Potassium 4.2 3.5 - 5.1 mmol/L Chloride 106 97 - 108 mmol/L  
 CO2 28 21 - 32 mmol/L Anion gap 4 (L) 5 - 15 mmol/L Glucose 86 65 - 100 mg/dL BUN 8 6 - 20 MG/DL Creatinine 0.66 0.55 - 1.02 MG/DL  
 BUN/Creatinine ratio 12 12 - 20 GFR est AA >60 >60 ml/min/1.73m2 GFR est non-AA >60 >60 ml/min/1.73m2 Calcium 8.5 8.5 - 10.1 MG/DL Assessment:  
 
Principal Problem: 
  Sickle cell disease ([facility] (2/10/2019) Active Problems: 
  Sickle cell anemia ([facility] (10/3/2014) Hypertension (10/3/2014) Hepatitis C (10/3/2014) Depression (10/5/2014) Sickle cell pain crisis ([facility] (9/8/2018) Plan: 1. Continue treatment of painful crises.

## 2019-02-14 NOTE — PROGRESS NOTES
Bedside shift change report given to Jorge Ordoñez and More Albarran RN (oncoming nurse) by Jocelyne Vivas RN (offgoing nurse). Report included the following information SBAR and ED Summary.

## 2019-02-15 LAB
COMMENT, HOLDF: NORMAL
SAMPLES BEING HELD,HOLD: NORMAL

## 2019-02-15 PROCEDURE — 65270000029 HC RM PRIVATE

## 2019-02-15 PROCEDURE — 74011250636 HC RX REV CODE- 250/636: Performed by: INTERNAL MEDICINE

## 2019-02-15 PROCEDURE — 74011250637 HC RX REV CODE- 250/637: Performed by: INTERNAL MEDICINE

## 2019-02-15 PROCEDURE — 94762 N-INVAS EAR/PLS OXIMTRY CONT: CPT

## 2019-02-15 PROCEDURE — 94761 N-INVAS EAR/PLS OXIMETRY MLT: CPT

## 2019-02-15 RX ADMIN — HYDROMORPHONE HYDROCHLORIDE 1 MG: 1 INJECTION, SOLUTION INTRAMUSCULAR; INTRAVENOUS; SUBCUTANEOUS at 11:27

## 2019-02-15 RX ADMIN — Medication 10 ML: at 05:06

## 2019-02-15 RX ADMIN — HYDROMORPHONE HYDROCHLORIDE 1 MG: 1 INJECTION, SOLUTION INTRAMUSCULAR; INTRAVENOUS; SUBCUTANEOUS at 14:43

## 2019-02-15 RX ADMIN — PANTOPRAZOLE SODIUM 40 MG: 40 TABLET, DELAYED RELEASE ORAL at 08:13

## 2019-02-15 RX ADMIN — ONDANSETRON 2 MG: 2 INJECTION INTRAMUSCULAR; INTRAVENOUS at 02:06

## 2019-02-15 RX ADMIN — ONDANSETRON 2 MG: 2 INJECTION INTRAMUSCULAR; INTRAVENOUS at 08:12

## 2019-02-15 RX ADMIN — POLYETHYLENE GLYCOL 3350 17 G: 17 POWDER, FOR SOLUTION ORAL at 08:12

## 2019-02-15 RX ADMIN — ONDANSETRON 2 MG: 2 INJECTION INTRAMUSCULAR; INTRAVENOUS at 17:57

## 2019-02-15 RX ADMIN — DIPHENHYDRAMINE HYDROCHLORIDE 12.5 MG: 50 INJECTION, SOLUTION INTRAMUSCULAR; INTRAVENOUS at 02:06

## 2019-02-15 RX ADMIN — DIPHENHYDRAMINE HYDROCHLORIDE 12.5 MG: 50 INJECTION, SOLUTION INTRAMUSCULAR; INTRAVENOUS at 17:57

## 2019-02-15 RX ADMIN — HEPARIN SODIUM 5000 UNITS: 5000 INJECTION INTRAVENOUS; SUBCUTANEOUS at 05:41

## 2019-02-15 RX ADMIN — Medication 10 ML: at 18:05

## 2019-02-15 RX ADMIN — HYDROMORPHONE HYDROCHLORIDE 1 MG: 1 INJECTION, SOLUTION INTRAMUSCULAR; INTRAVENOUS; SUBCUTANEOUS at 08:13

## 2019-02-15 RX ADMIN — Medication 20 ML: at 21:22

## 2019-02-15 RX ADMIN — HYDROMORPHONE HYDROCHLORIDE 1 MG: 1 INJECTION, SOLUTION INTRAMUSCULAR; INTRAVENOUS; SUBCUTANEOUS at 21:17

## 2019-02-15 RX ADMIN — CITALOPRAM HYDROBROMIDE 10 MG: 20 TABLET ORAL at 08:13

## 2019-02-15 RX ADMIN — FOLIC ACID 1 MG: 1 TABLET ORAL at 08:13

## 2019-02-15 RX ADMIN — DIPHENHYDRAMINE HYDROCHLORIDE 12.5 MG: 50 INJECTION, SOLUTION INTRAMUSCULAR; INTRAVENOUS at 08:13

## 2019-02-15 RX ADMIN — Medication 10 ML: at 18:06

## 2019-02-15 RX ADMIN — HEPARIN SODIUM 5000 UNITS: 5000 INJECTION INTRAVENOUS; SUBCUTANEOUS at 14:43

## 2019-02-15 RX ADMIN — HYDROMORPHONE HYDROCHLORIDE 1 MG: 1 INJECTION, SOLUTION INTRAMUSCULAR; INTRAVENOUS; SUBCUTANEOUS at 02:06

## 2019-02-15 RX ADMIN — DEXTROSE MONOHYDRATE, SODIUM CHLORIDE, AND POTASSIUM CHLORIDE 100 ML/HR: 50; 4.5; 1.49 INJECTION, SOLUTION INTRAVENOUS at 22:55

## 2019-02-15 RX ADMIN — HYDROMORPHONE HYDROCHLORIDE 1 MG: 1 INJECTION, SOLUTION INTRAMUSCULAR; INTRAVENOUS; SUBCUTANEOUS at 05:02

## 2019-02-15 RX ADMIN — HYDROMORPHONE HYDROCHLORIDE 1 MG: 1 INJECTION, SOLUTION INTRAMUSCULAR; INTRAVENOUS; SUBCUTANEOUS at 17:55

## 2019-02-15 RX ADMIN — HEPARIN SODIUM 5000 UNITS: 5000 INJECTION INTRAVENOUS; SUBCUTANEOUS at 21:23

## 2019-02-15 RX ADMIN — LOSARTAN POTASSIUM 50 MG: 50 TABLET, FILM COATED ORAL at 08:13

## 2019-02-15 RX ADMIN — Medication 10 ML: at 21:21

## 2019-02-15 NOTE — PROGRESS NOTES
General Daily Progress Note Admit Date: 2/10/2019 Subjective:  
 
Patient continues to complain of pain. Current Facility-Administered Medications Medication Dose Route Frequency  HYDROmorphone (PF) (DILAUDID) injection 1 mg  1 mg IntraVENous Q3H PRN  
 sodium chloride (NS) flush 10-30 mL  10-30 mL InterCATHeter PRN  
 sodium chloride (NS) flush 10-40 mL  10-40 mL InterCATHeter Q8H  
 losartan (COZAAR) tablet 50 mg  50 mg Oral DAILY  dextrose 5% - 0.45% NaCl with KCl 20 mEq/L infusion  100 mL/hr IntraVENous CONTINUOUS  
 citalopram (CELEXA) tablet 10 mg  10 mg Oral DAILY  diphenhydrAMINE (BENADRYL) capsule 50 mg  50 mg Oral DAILY PRN  
 fentaNYL (DURAGESIC) 75 mcg/hr patch 1 Patch  1 Patch TransDERmal Q72H  folic acid (FOLVITE) tablet 1 mg  1 mg Oral DAILY  sodium chloride (NS) flush 5-40 mL  5-40 mL IntraVENous Q8H  
 sodium chloride (NS) flush 5-40 mL  5-40 mL IntraVENous PRN  
 naloxone (NARCAN) injection 0.4 mg  0.4 mg IntraVENous PRN  
 ondansetron (ZOFRAN) injection 2 mg  2 mg IntraVENous Q6H PRN  
 bisacodyl (DULCOLAX) tablet 5 mg  5 mg Oral DAILY PRN  
 nicotine (NICODERM CQ) 7 mg/24 hr patch 1 Patch  1 Patch TransDERmal DAILY PRN  
 heparin (porcine) injection 5,000 Units  5,000 Units SubCUTAneous Q8H  
 diphenhydrAMINE (BENADRYL) injection 12.5 mg  12.5 mg IntraVENous Q6H PRN  polyethylene glycol (MIRALAX) packet 17 g  17 g Oral DAILY  pantoprazole (PROTONIX) tablet 40 mg  40 mg Oral ACB Review of Systems A comprehensive review of systems was negative. Objective:  
 
Patient Vitals for the past 24 hrs: 
 BP Temp Pulse Resp SpO2  
02/15/19 0752 121/73 98.9 °F (37.2 °C) 86 18 99 % 02/15/19 0502 122/74 98.6 °F (37 °C) 77 18 98 % 02/15/19 0000 124/65 99 °F (37.2 °C) 87 18 98 % 02/14/19 2022 137/77 99.8 °F (37.7 °C) 92 18 100 % 02/14/19 1618 126/68 100.1 °F (37.8 °C) 85 17 97 % 02/14/19 1152 144/86 98.6 °F (37 °C) 83 18 98 % No intake/output data recorded. 02/13 1901 - 02/15 0700 In: 2371 [P.O.:510; I.V.:1150] Out: - Physical Exam:  
Visit Vitals /73 Pulse 86 Temp 98.9 °F (37.2 °C) Resp 18 Wt 177 lb 11.1 oz (80.6 kg) SpO2 99% Breastfeeding? No  
BMI 25.50 kg/m² General appearance: alert, cooperative, no distress, appears stated age Neck: supple, symmetrical, trachea midline, no adenopathy, thyroid: not enlarged, symmetric, no tenderness/mass/nodules, no carotid bruit and no JVD Lungs: clear to auscultation bilaterally Heart: regular rate and rhythm, S1, S2 normal, no murmur, click, rub or gallop Abdomen: soft, non-tender. Bowel sounds normal. No masses,  no organomegaly Extremities: extremities normal, atraumatic, no cyanosis or edema Data Review Recent Results (from the past 24 hour(s)) SAMPLES BEING HELD Collection Time: 02/15/19  2:15 AM  
Result Value Ref Range SAMPLES BEING HELD 1PST 1LAV   
 COMMENT Add-on orders for these samples will be processed based on acceptable specimen integrity and analyte stability, which may vary by analyte. Assessment:  
 
Principal Problem: 
  Sickle cell disease ([email or web address] (2/10/2019) Active Problems: 
  Sickle cell anemia ([email or web address] (10/3/2014) Hypertension (10/3/2014) Hepatitis C (10/3/2014) Depression (10/5/2014) Sickle cell pain crisis ([email or web address] (9/8/2018) Plan: 1. Continue treatment of painful crises. No further drop in hemoglobin. No transfusion for now.

## 2019-02-15 NOTE — PROGRESS NOTES
8:41AM 
Pt continues to be treated for sickle cell crisis, focusing on pain control. PT/OT eval attempts but pt declined due to independence at home and ability to get around well IP. Plan for pt to d/c home when medically stable. CM will continue to follow and assist with d/c planning. Arlen Soto, MSW Care Manager

## 2019-02-15 NOTE — PROGRESS NOTES
Spiritual Care Assessment/Progress Note [facility] 
 
 
NAME: Lindsay Byrd      MRN: [id number] AGE: 66 y.o. SEX: female Hoahaoism Affiliation: Non Zoroastrianism  
Language: [place] 2/15/2019     Total Time (in minutes): 9 Spiritual Assessment begun in [facility] GENERAL SURGERY through conversation with: 
  
    [x]Patient        [] Family    [] Friend(s) Reason for Consult: Initial/Spiritual assessment, patient floor Spiritual beliefs: (Please include comment if needed) [x] Identifies with a zay tradition:     
   [] Supported by a zay community:        
   [] Claims no spiritual orientation:       
   [] Seeking spiritual identity:            
   [] Adheres to an individual form of spirituality:       
   [] Not able to assess:                   
 
    
Identified resources for coping:  
   [] Prayer                           
   [] Music                  [] Guided Imagery [x] Family/friends                 [] Pet visits [] Devotional reading                         [] Unknown 
   [] Other:                                       
 
 
Interventions offered during this visit: (See comments for more details) Patient Interventions: Affirmation of emotions/emotional suffering, Affirmation of zay, Initial/Spiritual assessment, patient floor Plan of Care: 
 
 [] Support spiritual and/or cultural needs  
 [] Support AMD and/or advance care planning process    
 [] Support grieving process 
 [] Coordinate Rites and/or Rituals  
 [] Coordination with community clergy [] No spiritual needs identified at this time 
 [] Detailed Plan of Care below (See Comments)  [] Make referral to Music Therapy 
[] Make referral to Pet Therapy    
[] Make referral to Addiction services 
[] Make referral to [facility] 
[] Make referral to Spiritual Care Partner 
[] No future visits requested       
[x] Follow up visits as needed Comments: Initial spiritual assessment in general surgery. Patient/family shared about concerns. Provided presence of ministry. Consulted with patient. Advised of  Availability. CATALINO Currie. Div  Paging Service [phone])

## 2019-02-15 NOTE — PROGRESS NOTES
General Surgery End of Shift Nursing Note Bedside shift change report given to Supriya Rodriguez 71 (oncoming nurse) by Mireya Lopez RN (offgoing nurse). Report included the following information SBAR. Shift worked:   7-7 Summary of shift:    Pt requires pain medication around the clock Issues for physician to address:   None Number times ambulated in hallway past shift: 0 Number of times OOB to chair past shift: 0 Pain Management: 
Current medication: Dilaudid Patient states pain is manageable on current pain medication: YES 
 
GI: 
 
Current diet:  DIET REGULAR Tolerating current diet: YES Passing flatus: YES Last Bowel Movement: yesterday Appearance:  
 
Respiratory: 
 
Incentive Spirometer at bedside: YES Patient instructed on use: YES Patient Safety: 
 
Falls Score: 2 Bed Alarm On? No 
Sitter? No 
 
Gladis Emery

## 2019-02-16 ENCOUNTER — APPOINTMENT (OUTPATIENT)
Dept: GENERAL RADIOLOGY | Age: 59
DRG: 812 | End: 2019-02-16
Attending: INTERNAL MEDICINE
Payer: MEDICARE

## 2019-02-16 LAB
ANION GAP SERPL CALC-SCNC: 5 MMOL/L (ref 5–15)
BASOPHILS # BLD: 0 K/UL (ref 0–0.1)
BASOPHILS NFR BLD: 0 % (ref 0–1)
BLASTS NFR BLD MANUAL: 0 %
BUN SERPL-MCNC: 6 MG/DL (ref 6–20)
BUN/CREAT SERPL: 8 (ref 12–20)
CALCIUM SERPL-MCNC: 8.5 MG/DL (ref 8.5–10.1)
CHLORIDE SERPL-SCNC: 101 MMOL/L (ref 97–108)
CO2 SERPL-SCNC: 29 MMOL/L (ref 21–32)
CREAT SERPL-MCNC: 0.72 MG/DL (ref 0.55–1.02)
DIFFERENTIAL METHOD BLD: ABNORMAL
EOSINOPHIL # BLD: 0.8 K/UL (ref 0–0.4)
EOSINOPHIL NFR BLD: 7 % (ref 0–7)
ERYTHROCYTE [DISTWIDTH] IN BLOOD BY AUTOMATED COUNT: 18.4 % (ref 11.5–14.5)
GLUCOSE SERPL-MCNC: 134 MG/DL (ref 65–100)
HCT VFR BLD AUTO: 20.5 % (ref 35–47)
HGB BLD-MCNC: 7.1 G/DL (ref 11.5–16)
IMM GRANULOCYTES # BLD AUTO: 0 K/UL
IMM GRANULOCYTES NFR BLD AUTO: 0 %
LYMPHOCYTES # BLD: 2.4 K/UL (ref 0.8–3.5)
LYMPHOCYTES NFR BLD: 21 % (ref 12–49)
MCH RBC QN AUTO: 31.6 PG (ref 26–34)
MCHC RBC AUTO-ENTMCNC: 34.6 G/DL (ref 30–36.5)
MCV RBC AUTO: 91.1 FL (ref 80–99)
METAMYELOCYTES NFR BLD MANUAL: 1 %
MONOCYTES # BLD: 1 K/UL (ref 0–1)
MONOCYTES NFR BLD: 9 % (ref 5–13)
MYELOCYTES NFR BLD MANUAL: 0 %
NEUTS BAND NFR BLD MANUAL: 0 % (ref 0–6)
NEUTS SEG # BLD: 7 K/UL (ref 1.8–8)
NEUTS SEG NFR BLD: 62 % (ref 32–75)
NRBC # BLD: 0.11 K/UL (ref 0–0.01)
NRBC BLD-RTO: 1 PER 100 WBC
OTHER CELLS NFR BLD MANUAL: 0 %
PLATELET # BLD AUTO: 301 K/UL (ref 150–400)
PMV BLD AUTO: 11.2 FL (ref 8.9–12.9)
POTASSIUM SERPL-SCNC: 4.4 MMOL/L (ref 3.5–5.1)
PROMYELOCYTES NFR BLD MANUAL: 0 %
RBC # BLD AUTO: 2.25 M/UL (ref 3.8–5.2)
RBC MORPH BLD: ABNORMAL
SODIUM SERPL-SCNC: 135 MMOL/L (ref 136–145)
WBC # BLD AUTO: 11.3 K/UL (ref 3.6–11)

## 2019-02-16 PROCEDURE — 86078 PHYS BLOOD BANK SERV REACTJ: CPT

## 2019-02-16 PROCEDURE — 86900 BLOOD TYPING SEROLOGIC ABO: CPT

## 2019-02-16 PROCEDURE — 65270000029 HC RM PRIVATE

## 2019-02-16 PROCEDURE — 94760 N-INVAS EAR/PLS OXIMETRY 1: CPT

## 2019-02-16 PROCEDURE — 74011250637 HC RX REV CODE- 250/637: Performed by: INTERNAL MEDICINE

## 2019-02-16 PROCEDURE — 86923 COMPATIBILITY TEST ELECTRIC: CPT

## 2019-02-16 PROCEDURE — 80048 BASIC METABOLIC PNL TOTAL CA: CPT

## 2019-02-16 PROCEDURE — 71045 X-RAY EXAM CHEST 1 VIEW: CPT

## 2019-02-16 PROCEDURE — P9016 RBC LEUKOCYTES REDUCED: HCPCS

## 2019-02-16 PROCEDURE — 85027 COMPLETE CBC AUTOMATED: CPT

## 2019-02-16 PROCEDURE — 36415 COLL VENOUS BLD VENIPUNCTURE: CPT

## 2019-02-16 PROCEDURE — 85660 RBC SICKLE CELL TEST: CPT

## 2019-02-16 PROCEDURE — 30233N1 TRANSFUSION OF NONAUTOLOGOUS RED BLOOD CELLS INTO PERIPHERAL VEIN, PERCUTANEOUS APPROACH: ICD-10-PCS | Performed by: INTERNAL MEDICINE

## 2019-02-16 PROCEDURE — 74011250636 HC RX REV CODE- 250/636: Performed by: INTERNAL MEDICINE

## 2019-02-16 PROCEDURE — 86902 BLOOD TYPE ANTIGEN DONOR EA: CPT

## 2019-02-16 PROCEDURE — 36430 TRANSFUSION BLD/BLD COMPNT: CPT

## 2019-02-16 RX ORDER — ACETAMINOPHEN 325 MG/1
650 TABLET ORAL
Status: DISCONTINUED | OUTPATIENT
Start: 2019-02-16 | End: 2019-02-16

## 2019-02-16 RX ORDER — ACETAMINOPHEN 325 MG/1
650 TABLET ORAL
Status: DISCONTINUED | OUTPATIENT
Start: 2019-02-16 | End: 2019-03-02 | Stop reason: HOSPADM

## 2019-02-16 RX ORDER — SODIUM CHLORIDE 9 MG/ML
250 INJECTION, SOLUTION INTRAVENOUS AS NEEDED
Status: DISCONTINUED | OUTPATIENT
Start: 2019-02-16 | End: 2019-02-26

## 2019-02-16 RX ADMIN — HEPARIN SODIUM 5000 UNITS: 5000 INJECTION INTRAVENOUS; SUBCUTANEOUS at 13:45

## 2019-02-16 RX ADMIN — PANTOPRAZOLE SODIUM 40 MG: 40 TABLET, DELAYED RELEASE ORAL at 06:08

## 2019-02-16 RX ADMIN — HYDROMORPHONE HYDROCHLORIDE 1 MG: 1 INJECTION, SOLUTION INTRAMUSCULAR; INTRAVENOUS; SUBCUTANEOUS at 10:19

## 2019-02-16 RX ADMIN — ONDANSETRON 2 MG: 2 INJECTION INTRAMUSCULAR; INTRAVENOUS at 10:19

## 2019-02-16 RX ADMIN — HYDROMORPHONE HYDROCHLORIDE 1 MG: 1 INJECTION, SOLUTION INTRAMUSCULAR; INTRAVENOUS; SUBCUTANEOUS at 13:45

## 2019-02-16 RX ADMIN — Medication 20 ML: at 21:01

## 2019-02-16 RX ADMIN — DEXTROSE MONOHYDRATE, SODIUM CHLORIDE, AND POTASSIUM CHLORIDE 100 ML/HR: 50; 4.5; 1.49 INJECTION, SOLUTION INTRAVENOUS at 21:08

## 2019-02-16 RX ADMIN — HYDROMORPHONE HYDROCHLORIDE 1 MG: 1 INJECTION, SOLUTION INTRAMUSCULAR; INTRAVENOUS; SUBCUTANEOUS at 03:17

## 2019-02-16 RX ADMIN — DIPHENHYDRAMINE HYDROCHLORIDE 12.5 MG: 50 INJECTION, SOLUTION INTRAMUSCULAR; INTRAVENOUS at 00:20

## 2019-02-16 RX ADMIN — Medication 10 ML: at 21:01

## 2019-02-16 RX ADMIN — ONDANSETRON 2 MG: 2 INJECTION INTRAMUSCULAR; INTRAVENOUS at 00:20

## 2019-02-16 RX ADMIN — DIPHENHYDRAMINE HYDROCHLORIDE 12.5 MG: 50 INJECTION, SOLUTION INTRAMUSCULAR; INTRAVENOUS at 10:19

## 2019-02-16 RX ADMIN — CITALOPRAM HYDROBROMIDE 10 MG: 20 TABLET ORAL at 09:07

## 2019-02-16 RX ADMIN — DEXTROSE MONOHYDRATE, SODIUM CHLORIDE, AND POTASSIUM CHLORIDE 100 ML/HR: 50; 4.5; 1.49 INJECTION, SOLUTION INTRAVENOUS at 09:07

## 2019-02-16 RX ADMIN — HYDROMORPHONE HYDROCHLORIDE 1 MG: 1 INJECTION, SOLUTION INTRAMUSCULAR; INTRAVENOUS; SUBCUTANEOUS at 20:56

## 2019-02-16 RX ADMIN — HYDROMORPHONE HYDROCHLORIDE 1 MG: 1 INJECTION, SOLUTION INTRAMUSCULAR; INTRAVENOUS; SUBCUTANEOUS at 00:21

## 2019-02-16 RX ADMIN — Medication 10 ML: at 20:56

## 2019-02-16 RX ADMIN — Medication 40 ML: at 06:04

## 2019-02-16 RX ADMIN — DIPHENHYDRAMINE HYDROCHLORIDE 12.5 MG: 50 INJECTION, SOLUTION INTRAMUSCULAR; INTRAVENOUS at 17:39

## 2019-02-16 RX ADMIN — FOLIC ACID 1 MG: 1 TABLET ORAL at 09:07

## 2019-02-16 RX ADMIN — Medication 10 ML: at 13:49

## 2019-02-16 RX ADMIN — ONDANSETRON 2 MG: 2 INJECTION INTRAMUSCULAR; INTRAVENOUS at 17:39

## 2019-02-16 RX ADMIN — LOSARTAN POTASSIUM 50 MG: 50 TABLET, FILM COATED ORAL at 09:07

## 2019-02-16 RX ADMIN — HYDROMORPHONE HYDROCHLORIDE 1 MG: 1 INJECTION, SOLUTION INTRAMUSCULAR; INTRAVENOUS; SUBCUTANEOUS at 06:06

## 2019-02-16 RX ADMIN — HEPARIN SODIUM 5000 UNITS: 5000 INJECTION INTRAVENOUS; SUBCUTANEOUS at 06:04

## 2019-02-16 RX ADMIN — HYDROMORPHONE HYDROCHLORIDE 1 MG: 1 INJECTION, SOLUTION INTRAMUSCULAR; INTRAVENOUS; SUBCUTANEOUS at 23:58

## 2019-02-16 RX ADMIN — ACETAMINOPHEN 650 MG: 325 TABLET ORAL at 17:39

## 2019-02-16 RX ADMIN — Medication 10 ML: at 13:48

## 2019-02-16 RX ADMIN — HYDROMORPHONE HYDROCHLORIDE 1 MG: 1 INJECTION, SOLUTION INTRAMUSCULAR; INTRAVENOUS; SUBCUTANEOUS at 17:45

## 2019-02-16 RX ADMIN — Medication 30 ML: at 03:18

## 2019-02-16 RX ADMIN — ONDANSETRON 2 MG: 2 INJECTION INTRAMUSCULAR; INTRAVENOUS at 06:03

## 2019-02-16 RX ADMIN — DIPHENHYDRAMINE HYDROCHLORIDE 12.5 MG: 50 INJECTION, SOLUTION INTRAMUSCULAR; INTRAVENOUS at 23:58

## 2019-02-16 RX ADMIN — DIPHENHYDRAMINE HYDROCHLORIDE 12.5 MG: 50 INJECTION, SOLUTION INTRAMUSCULAR; INTRAVENOUS at 06:04

## 2019-02-16 RX ADMIN — POLYETHYLENE GLYCOL 3350 17 G: 17 POWDER, FOR SOLUTION ORAL at 09:06

## 2019-02-16 RX ADMIN — HEPARIN SODIUM 5000 UNITS: 5000 INJECTION INTRAVENOUS; SUBCUTANEOUS at 20:59

## 2019-02-16 RX ADMIN — ONDANSETRON 2 MG: 2 INJECTION INTRAMUSCULAR; INTRAVENOUS at 23:58

## 2019-02-16 NOTE — PROGRESS NOTES
Progress Note Patient: Mary Anne Sampson MRN: [id number]  SSN: [id number] YOB: 1960  Age: 66 y.o. Sex: female Admit Date: 2/10/2019 LOS: 6 days Objective:  
 
Vitals:  
 02/16/19 1977 02/16/19 0818 02/16/19 1228 02/16/19 1509 BP: 142/72 120/77 124/53 Pulse: (!) 106 89 88 Resp: 18 18 18 Temp: (!) 100.5 °F (38.1 °C) 99.2 °F (37.3 °C) 99.3 °F (37.4 °C) SpO2: 95% 95% 96% Weight:    80.6 kg (177 lb 11.1 oz) Height:    5' 10\" (1.778 m) Intake and Output: 
Current Shift: 02/16 0701 - 02/16 1900 In: 1736.7 [P.O.:700; I.V.:1036.7] Out: - Last three shifts: 02/14 1901 - 02/16 0700 In: 4243.3 [P.O.:760; I.V.:3483.3] Out: -  
 
 
 
Lab/Data Review: All lab results for the last 24 hours reviewed. Assessment:  
 
Principal Problem: 
  Sickle cell disease ([facility] 75.) (2/10/2019) Active Problems: 
  Sickle cell anemia ([facility] (10/3/2014) Hypertension (10/3/2014) Hepatitis C (10/3/2014) Depression (10/5/2014) Sickle cell pain crisis ([facility] (9/8/2018) Plan:  
2 unit of blood today Pain management Signed By: Bel Faustin RN February 16, 2019   
 
0830 Bedside and Verbal shift change report given to [facility]  (oncoming nurse) by Joni Escalante  (offgoing nurse). Report included the following information SBAR and Kardex. 1710 blood administration started dual signoff with [facility], pt oral temp was 100.7 pgd Dr Christian Manual to ok blood admin and order Tylenol. (new orders in [facility]) 1725 temp 101.7 gave Tylenol. Will continue to monitor 1745 pt vitals consistent 1755 pt recvd pain meds /83 temp 102.0 pt states that she \"feels fine, except for pain that is normal\"  Stopped blood admin. pgd Dr Christian Manual. . Per Dr Christian Manual hold blood and do 2nd infusion tomorrow 1825 blood taken to Lab per protocol 1930 Bedside and Verbal shift change report given to R[place] Pardo (oncoming nurse) by Jose Juan Lopes RN  (offgoing nurse). Report included the following information SBAR, Kardex and MAR.

## 2019-02-16 NOTE — PROGRESS NOTES
General Surgery End of Shift Nursing Note Bedside shift change report given to [address] (oncoming nurse) by Brandt French RN (offgoing nurse). Report included the following information SBAR, Kardex, OR Summary, Intake/Output, MAR and Recent Results. Shift worked:   7p-7a Summary of shift:  Pt continues to report level \"6\" pain overnight, receiving Dilaudid Q 3h for pain and other meds to reduce s/e Issues for physician to address:   none Number times ambulated in hallway past shift: 0 Number of times OOB to chair past shift: 0 Pain Management: 
Current medication: Dilaudid 1mg IVP Patient states pain is manageable on current pain medication: YES 
 
GI: 
 
Current diet:  DIET REGULAR Tolerating current diet: YES Passing flatus: YES Last Bowel Movement: several days ago Appearance:  
 
Respiratory: 
 
Incentive Spirometer at bedside: YES Patient instructed on use: YES Patient Safety: 
 
Falls Score: 1 Bed Alarm On? No 
Sitter?  No 
 
Billy Dimas RN

## 2019-02-16 NOTE — PROGRESS NOTES
Initial Nutrition Assessment: 
 
INTERVENTIONS/RECOMMENDATIONS:  
· Meals/Snacks: General/healthful diet: Continue regular diet. ASSESSMENT:  
2/16:  Chart reviewed; med noted for sickle cell crisis. Pt is on a Regular diet and tolerating % of meals. Diet Order: Regular 
% Eaten:   
Patient Vitals for the past 72 hrs: 
 % Diet Eaten 02/16/19 1450 100 % 02/16/19 1255 100 % 02/16/19 0907 100 % 02/15/19 1727 100 % 02/15/19 0835 100 % 02/14/19 1830 100 % 02/14/19 1152 100 % 02/14/19 0856 100 % 02/13/19 1800 100 % 02/13/19 1742 75 % Pertinent Medications: [x]Reviewed []Other: folvite, cozaar, miralax, protonix, IVF Pertinent Labs: [x]Reviewed []Other Food Allergies: [x]None []Other Last BM:    [x]Active     []Hyperactive  []Hypoactive       [] Absent BS Skin:    [x] Intact   [] Incision  [] Breakdown  [] Other: Anthropometrics:  
Height: 5' 10\" (177.8 cm) Weight: 80.6 kg (177 lb 11.1 oz) IBW (%IBW):   ( ) UBW (%UBW):   (  %) Last Weight Metrics: 
Weight Loss Metrics 2/16/2019 2/7/2019 1/19/2019 1/10/2019 12/12/2018 11/13/2018 10/29/2018 Today's Wt 177 lb 11.1 oz 181 lb 4.8 oz 175 lb 14.8 oz 182 lb 12.8 oz 169 lb 12.1 oz 197 lb 1.6 oz 170 lb 10.2 oz  
BMI 25.5 kg/m2 26.01 kg/m2 25.24 kg/m2 25.5 kg/m2 23.68 kg/m2 27.49 kg/m2 23.8 kg/m2 BMI: Body mass index is 25.5 kg/m². This BMI is indicative of: 
 []Underweight    []Normal    [x]Overweight    [] Obesity   [] Extreme Obesity (BMI>40) Estimated Nutrition Needs (Based on):  
1921 Kcals/day(BMR (31-70-28-28) x 1. 3AF) , 81 g(1.0 g/kg bw) Protein Carbohydrate: At Least 130 g/day  Fluids: 1900 mL/day (1ml/kcal) NUTRITION DIAGNOSES:  
Problem:  No nutritional diagnosis at this time Etiology: related to Signs/Symptoms: as evidenced by NUTRITION INTERVENTIONS: 
Meals/Snacks: General/healthful diet GOAL:  
PO intake at least 50% of meals next 5-7 days LEARNING NEEDS (Diet, Food/Nutrient-Drug Interaction):  
 [x] None Identified 
 [] Identified and Education Provided/Documented 
 [] Identified and Pt declined/was not appropriate Cultureal, Confucianism, OR Ethnic Dietary Needs:  
 [x] None Identified 
 [] Identified and Addressed 
 
 [x] Interdisciplinary Care Plan Reviewed/Documented  
 [x] Discharge Planning:  Continue regular diet MONITORING /EVALUATION:  
Food/Nutrient Intake Outcomes: Total energy intake Physical Signs/Symptoms Outcomes: Weight/weight change NUTRITION RISK:  
 [] Patient At Nutritional Risk  
 [x] Patient Not At Nutritional Risk PT SEEN FOR:  
 []  MD Consult: []Calorie Count []Diabetic Diet Education []Diet Education []Electrolyte Management []General Nutrition Management and Supplements []Management of Tube Feeding []TPN Recommendations []  RN Referral:  []MST score >=2 
   []Enteral/Parenteral Nutrition PTA []Pregnant: Gestational DM or Multigestation 
   []Pressure Ulcer/Wound Care needs 
     
[]  Low BMI [x]  KIRILL Hagen RD Pager [phone] Weekend Pager [phone]

## 2019-02-16 NOTE — PROGRESS NOTES
General Daily Progress Note Admit Date: 2/10/2019 Subjective:  
 
Patient continues to complain of pain. Current Facility-Administered Medications Medication Dose Route Frequency  0.9% sodium chloride infusion 250 mL  250 mL IntraVENous PRN  
 HYDROmorphone (PF) (DILAUDID) injection 1 mg  1 mg IntraVENous Q3H PRN  
 sodium chloride (NS) flush 10-30 mL  10-30 mL InterCATHeter PRN  
 sodium chloride (NS) flush 10-40 mL  10-40 mL InterCATHeter Q8H  
 losartan (COZAAR) tablet 50 mg  50 mg Oral DAILY  dextrose 5% - 0.45% NaCl with KCl 20 mEq/L infusion  100 mL/hr IntraVENous CONTINUOUS  
 citalopram (CELEXA) tablet 10 mg  10 mg Oral DAILY  diphenhydrAMINE (BENADRYL) capsule 50 mg  50 mg Oral DAILY PRN  
 fentaNYL (DURAGESIC) 75 mcg/hr patch 1 Patch  1 Patch TransDERmal Q72H  folic acid (FOLVITE) tablet 1 mg  1 mg Oral DAILY  sodium chloride (NS) flush 5-40 mL  5-40 mL IntraVENous Q8H  
 sodium chloride (NS) flush 5-40 mL  5-40 mL IntraVENous PRN  
 naloxone (NARCAN) injection 0.4 mg  0.4 mg IntraVENous PRN  
 ondansetron (ZOFRAN) injection 2 mg  2 mg IntraVENous Q6H PRN  
 bisacodyl (DULCOLAX) tablet 5 mg  5 mg Oral DAILY PRN  
 nicotine (NICODERM CQ) 7 mg/24 hr patch 1 Patch  1 Patch TransDERmal DAILY PRN  
 heparin (porcine) injection 5,000 Units  5,000 Units SubCUTAneous Q8H  
 diphenhydrAMINE (BENADRYL) injection 12.5 mg  12.5 mg IntraVENous Q6H PRN  polyethylene glycol (MIRALAX) packet 17 g  17 g Oral DAILY  pantoprazole (PROTONIX) tablet 40 mg  40 mg Oral ACB Review of Systems A comprehensive review of systems was negative. Objective:  
 
Patient Vitals for the past 24 hrs: 
 BP Temp Pulse Resp SpO2  
02/16/19 0818 120/77 99.2 °F (37.3 °C) 89 18 95 % 02/16/19 0346 142/72 (!) 100.5 °F (38.1 °C) (!) 106 18 95 % 02/15/19 2318 115/55 (!) 100.8 °F (38.2 °C) 98 18 97 % 02/15/19 1958 136/77 99.5 °F (37.5 °C) 87 16 99 % 02/15/19 1605 118/52 98.7 °F (37.1 °C) 86 16 98 % 02/15/19 1242 118/59 97.6 °F (36.4 °C) 78 18 96 % No intake/output data recorded. 02/14 1901 - 02/16 0700 In: 4243.3 [P.O.:760; I.V.:3483.3] Out: - Physical Exam:  
Visit Vitals /77 (BP 1 Location: Right arm, BP Patient Position: Sitting) Pulse 89 Temp 99.2 °F (37.3 °C) Resp 18 Wt 177 lb 11.1 oz (80.6 kg) SpO2 95% Breastfeeding? No  
BMI 25.50 kg/m² General appearance: alert, cooperative, no distress, appears stated age Neck: supple, symmetrical, trachea midline, no adenopathy, thyroid: not enlarged, symmetric, no tenderness/mass/nodules, no carotid bruit and no JVD Lungs: clear to auscultation bilaterally Heart: regular rate and rhythm, S1, S2 normal, no murmur, click, rub or gallop Abdomen: soft, non-tender. Bowel sounds normal. No masses,  no organomegaly Extremities: extremities normal, atraumatic, no cyanosis or edema Data Review Recent Results (from the past 24 hour(s)) CBC WITH MANUAL DIFF Collection Time: 02/16/19  3:12 AM  
Result Value Ref Range WBC 11.3 (H) 3.6 - 11.0 K/uL  
 RBC 2.25 (L) 3.80 - 5.20 M/uL HGB 7.1 (L) 11.5 - 16.0 g/dL HCT 20.5 (L) 35.0 - 47.0 % MCV 91.1 80.0 - 99.0 FL  
 MCH 31.6 26.0 - 34.0 PG  
 MCHC 34.6 30.0 - 36.5 g/dL  
 RDW 18.4 (H) 11.5 - 14.5 % PLATELET 086 094 - 188 K/uL MPV 11.2 8.9 - 12.9 FL  
 NRBC 1.0 (H) 0  WBC ABSOLUTE NRBC 0.11 (H) 0.00 - 0.01 K/uL NEUTROPHILS 62 32 - 75 % BAND NEUTROPHILS 0 0 - 6 % LYMPHOCYTES 21 12 - 49 % MONOCYTES 9 5 - 13 % EOSINOPHILS 7 0 - 7 % BASOPHILS 0 0 - 1 % METAMYELOCYTES 1 (H) 0 % MYELOCYTES 0 0 % PROMYELOCYTES 0 0 % BLASTS 0 0 % OTHER CELL 0 0 IMMATURE GRANULOCYTES 0 %  
 ABS. NEUTROPHILS 7.0 1.8 - 8.0 K/UL  
 ABS. LYMPHOCYTES 2.4 0.8 - 3.5 K/UL  
 ABS. MONOCYTES 1.0 0.0 - 1.0 K/UL  
 ABS. EOSINOPHILS 0.8 (H) 0.0 - 0.4 K/UL  
 ABS. BASOPHILS 0.0 0.0 - 0.1 K/UL ABS. IMM. GRANS. 0.0 K/UL  
 DF MANUAL    
 RBC COMMENTS ANISOCYTOSIS 2+ 
    
 RBC COMMENTS TARGET CELLS 2+ 
    
 RBC COMMENTS SICKLE CELLS 
PRESENT 
    
METABOLIC PANEL, BASIC Collection Time: 02/16/19  3:12 AM  
Result Value Ref Range Sodium 135 (L) 136 - 145 mmol/L Potassium 4.4 3.5 - 5.1 mmol/L Chloride 101 97 - 108 mmol/L  
 CO2 29 21 - 32 mmol/L Anion gap 5 5 - 15 mmol/L Glucose 134 (H) 65 - 100 mg/dL BUN 6 6 - 20 MG/DL Creatinine 0.72 0.55 - 1.02 MG/DL  
 BUN/Creatinine ratio 8 (L) 12 - 20 GFR est AA >60 >60 ml/min/1.73m2 GFR est non-AA >60 >60 ml/min/1.73m2 Calcium 8.5 8.5 - 10.1 MG/DL Assessment:  
 
Principal Problem: 
  Sickle cell disease ([facility] (2/10/2019) Active Problems: 
  Sickle cell anemia ([facility] 75.) (10/3/2014) Hypertension (10/3/2014) Hepatitis C (10/3/2014) Depression (10/5/2014) Sickle cell pain crisis ([facility] 75.) (9/8/2018) Plan: 1. Hemoglobin continues to drop as expected. Transfuse 2 units of packed cells. Hopefully this will shorten crises.

## 2019-02-16 NOTE — PROGRESS NOTES
1500 Bedside and Verbal shift change report given to [facility] Patrick  (oncoming nurse) by Leila Bruner  (offgoing nurse). Report included the following information SBAR, Kardex and MAR. At report pt was resting in bed, had \"a little\" pain, but understands that meds cant be taken yet 1830 Pt was walking hallway, with IV pole, mo assistive devices were needed 1930 Bedside and Verbal shift change report given to [facility] RN  (oncoming nurse) by Cory Perdue RN  (offgoing nurse). Report included the following information SBAR, Kardex and MAR.

## 2019-02-17 LAB
ABO + RH BLD: NORMAL
ABO + RH BLD: NORMAL
ANTIGENS PRESENT RBC DONR: NORMAL
ANTIGENS PRESENT RBC DONR: NORMAL
BASOPHILS # BLD: 0 K/UL (ref 0–0.1)
BASOPHILS NFR BLD: 0 % (ref 0–1)
BLASTS NFR BLD MANUAL: 0 %
BLD PROD TYP BPU: NORMAL
BLOOD BAG HEMOLYSIS,BLBAG: NORMAL
BLOOD GROUP ANTIBODIES SERPL: NORMAL
BPU ID: NORMAL
BPU ID: NORMAL
CLERICAL ERRORS,CLERR: NORMAL
CROSSMATCH RESULT,%XM: NORMAL
CROSSMATCH RESULT,%XM: NORMAL
DAT P TRANSF RBC QL: NORMAL
DAT RBC QL: NORMAL
DIFFERENTIAL METHOD BLD: ABNORMAL
EOSINOPHIL # BLD: 0.2 K/UL (ref 0–0.4)
EOSINOPHIL NFR BLD: 2 % (ref 0–7)
ERYTHROCYTE [DISTWIDTH] IN BLOOD BY AUTOMATED COUNT: 18.2 % (ref 11.5–14.5)
HCT VFR BLD AUTO: 21.3 % (ref 35–47)
HEMOLYSIS, POST TXN,PSTHE: NORMAL
HEMOLYSIS,PRE TXN,PREHE: NORMAL
HGB BLD-MCNC: 7.2 G/DL (ref 11.5–16)
IMM GRANULOCYTES # BLD AUTO: 0 K/UL
IMM GRANULOCYTES NFR BLD AUTO: 0 %
LYMPHOCYTES # BLD: 3 K/UL (ref 0.8–3.5)
LYMPHOCYTES NFR BLD: 30 % (ref 12–49)
MCH RBC QN AUTO: 31 PG (ref 26–34)
MCHC RBC AUTO-ENTMCNC: 33.8 G/DL (ref 30–36.5)
MCV RBC AUTO: 91.8 FL (ref 80–99)
METAMYELOCYTES NFR BLD MANUAL: 1 %
MONOCYTES # BLD: 0.4 K/UL (ref 0–1)
MONOCYTES NFR BLD: 4 % (ref 5–13)
MYELOCYTES NFR BLD MANUAL: 0 %
NEUTS BAND NFR BLD MANUAL: 0 % (ref 0–6)
NEUTS SEG # BLD: 6.2 K/UL (ref 1.8–8)
NEUTS SEG NFR BLD: 63 % (ref 32–75)
NRBC # BLD: 0.06 K/UL (ref 0–0.01)
NRBC BLD-RTO: 0.6 PER 100 WBC
OTHER CELLS NFR BLD MANUAL: 0 %
PATH REV BLD -IMP: NORMAL
PHYSICIAN INSTRUCTIO,%PI: NORMAL
PLATELET # BLD AUTO: 253 K/UL (ref 150–400)
PMV BLD AUTO: 11.1 FL (ref 8.9–12.9)
PROMYELOCYTES NFR BLD MANUAL: 0 %
RBC # BLD AUTO: 2.32 M/UL (ref 3.8–5.2)
RBC MORPH BLD: ABNORMAL
SPECIMEN EXP DATE BLD: NORMAL
STATUS OF UNIT,%ST: NORMAL
STATUS OF UNIT,%ST: NORMAL
UNIT DIVISION, %UDIV: 0
UNIT DIVISION, %UDIV: 0
UNIT NUMBER,UN: NORMAL
WBC # BLD AUTO: 9.9 K/UL (ref 3.6–11)

## 2019-02-17 PROCEDURE — 85027 COMPLETE CBC AUTOMATED: CPT

## 2019-02-17 PROCEDURE — 36415 COLL VENOUS BLD VENIPUNCTURE: CPT

## 2019-02-17 PROCEDURE — 74011250637 HC RX REV CODE- 250/637: Performed by: INTERNAL MEDICINE

## 2019-02-17 PROCEDURE — 94762 N-INVAS EAR/PLS OXIMTRY CONT: CPT

## 2019-02-17 PROCEDURE — 74011000258 HC RX REV CODE- 258: Performed by: INTERNAL MEDICINE

## 2019-02-17 PROCEDURE — 74011250636 HC RX REV CODE- 250/636: Performed by: INTERNAL MEDICINE

## 2019-02-17 PROCEDURE — 94760 N-INVAS EAR/PLS OXIMETRY 1: CPT

## 2019-02-17 PROCEDURE — 65270000029 HC RM PRIVATE

## 2019-02-17 PROCEDURE — 36591 DRAW BLOOD OFF VENOUS DEVICE: CPT

## 2019-02-17 PROCEDURE — 87040 BLOOD CULTURE FOR BACTERIA: CPT

## 2019-02-17 RX ORDER — VANCOMYCIN/0.9 % SOD CHLORIDE 1.5G/250ML
1500 PLASTIC BAG, INJECTION (ML) INTRAVENOUS ONCE
Status: COMPLETED | OUTPATIENT
Start: 2019-02-17 | End: 2019-02-17

## 2019-02-17 RX ADMIN — ACETAMINOPHEN 650 MG: 325 TABLET ORAL at 09:28

## 2019-02-17 RX ADMIN — Medication 30 ML: at 06:01

## 2019-02-17 RX ADMIN — HYDROMORPHONE HYDROCHLORIDE 1 MG: 1 INJECTION, SOLUTION INTRAMUSCULAR; INTRAVENOUS; SUBCUTANEOUS at 23:04

## 2019-02-17 RX ADMIN — BISACODYL 5 MG: 5 TABLET, COATED ORAL at 09:27

## 2019-02-17 RX ADMIN — Medication 10 ML: at 20:09

## 2019-02-17 RX ADMIN — Medication 10 ML: at 03:03

## 2019-02-17 RX ADMIN — DIPHENHYDRAMINE HYDROCHLORIDE 12.5 MG: 50 INJECTION, SOLUTION INTRAMUSCULAR; INTRAVENOUS at 09:28

## 2019-02-17 RX ADMIN — Medication 10 ML: at 15:54

## 2019-02-17 RX ADMIN — CITALOPRAM HYDROBROMIDE 10 MG: 20 TABLET ORAL at 09:29

## 2019-02-17 RX ADMIN — ACETAMINOPHEN 650 MG: 325 TABLET ORAL at 19:56

## 2019-02-17 RX ADMIN — HEPARIN SODIUM 5000 UNITS: 5000 INJECTION INTRAVENOUS; SUBCUTANEOUS at 22:15

## 2019-02-17 RX ADMIN — ONDANSETRON 2 MG: 2 INJECTION INTRAMUSCULAR; INTRAVENOUS at 09:28

## 2019-02-17 RX ADMIN — ONDANSETRON 2 MG: 2 INJECTION INTRAMUSCULAR; INTRAVENOUS at 16:48

## 2019-02-17 RX ADMIN — DIPHENHYDRAMINE HYDROCHLORIDE 12.5 MG: 50 INJECTION, SOLUTION INTRAMUSCULAR; INTRAVENOUS at 16:48

## 2019-02-17 RX ADMIN — Medication 10 ML: at 06:01

## 2019-02-17 RX ADMIN — HYDROMORPHONE HYDROCHLORIDE 1 MG: 1 INJECTION, SOLUTION INTRAMUSCULAR; INTRAVENOUS; SUBCUTANEOUS at 03:03

## 2019-02-17 RX ADMIN — ONDANSETRON 2 MG: 2 INJECTION INTRAMUSCULAR; INTRAVENOUS at 23:05

## 2019-02-17 RX ADMIN — HEPARIN SODIUM 5000 UNITS: 5000 INJECTION INTRAVENOUS; SUBCUTANEOUS at 05:57

## 2019-02-17 RX ADMIN — FOLIC ACID 1 MG: 1 TABLET ORAL at 09:27

## 2019-02-17 RX ADMIN — DEXTROSE MONOHYDRATE, SODIUM CHLORIDE, AND POTASSIUM CHLORIDE 100 ML/HR: 50; 4.5; 1.49 INJECTION, SOLUTION INTRAVENOUS at 19:57

## 2019-02-17 RX ADMIN — VANCOMYCIN HYDROCHLORIDE 1500 MG: 10 INJECTION, POWDER, LYOPHILIZED, FOR SOLUTION INTRAVENOUS at 15:54

## 2019-02-17 RX ADMIN — Medication 10 ML: at 13:27

## 2019-02-17 RX ADMIN — HYDROMORPHONE HYDROCHLORIDE 1 MG: 1 INJECTION, SOLUTION INTRAMUSCULAR; INTRAVENOUS; SUBCUTANEOUS at 09:27

## 2019-02-17 RX ADMIN — LOSARTAN POTASSIUM 50 MG: 50 TABLET, FILM COATED ORAL at 09:29

## 2019-02-17 RX ADMIN — HYDROMORPHONE HYDROCHLORIDE 1 MG: 1 INJECTION, SOLUTION INTRAMUSCULAR; INTRAVENOUS; SUBCUTANEOUS at 05:57

## 2019-02-17 RX ADMIN — HYDROMORPHONE HYDROCHLORIDE 1 MG: 1 INJECTION, SOLUTION INTRAMUSCULAR; INTRAVENOUS; SUBCUTANEOUS at 20:09

## 2019-02-17 RX ADMIN — Medication 5 ML: at 23:07

## 2019-02-17 RX ADMIN — POLYETHYLENE GLYCOL 3350 17 G: 17 POWDER, FOR SOLUTION ORAL at 09:27

## 2019-02-17 RX ADMIN — CEFTRIAXONE SODIUM 2 G: 2 INJECTION, POWDER, FOR SOLUTION INTRAMUSCULAR; INTRAVENOUS at 13:25

## 2019-02-17 RX ADMIN — HEPARIN SODIUM 5000 UNITS: 5000 INJECTION INTRAVENOUS; SUBCUTANEOUS at 13:26

## 2019-02-17 RX ADMIN — DEXTROSE MONOHYDRATE, SODIUM CHLORIDE, AND POTASSIUM CHLORIDE 100 ML/HR: 50; 4.5; 1.49 INJECTION, SOLUTION INTRAVENOUS at 07:37

## 2019-02-17 RX ADMIN — PANTOPRAZOLE SODIUM 40 MG: 40 TABLET, DELAYED RELEASE ORAL at 06:02

## 2019-02-17 RX ADMIN — DIPHENHYDRAMINE HYDROCHLORIDE 12.5 MG: 50 INJECTION, SOLUTION INTRAMUSCULAR; INTRAVENOUS at 23:05

## 2019-02-17 RX ADMIN — Medication 30 ML: at 03:04

## 2019-02-17 RX ADMIN — Medication 10 ML: at 22:16

## 2019-02-17 RX ADMIN — Medication 10 ML: at 22:15

## 2019-02-17 RX ADMIN — HYDROMORPHONE HYDROCHLORIDE 1 MG: 1 INJECTION, SOLUTION INTRAMUSCULAR; INTRAVENOUS; SUBCUTANEOUS at 16:48

## 2019-02-17 RX ADMIN — HYDROMORPHONE HYDROCHLORIDE 1 MG: 1 INJECTION, SOLUTION INTRAMUSCULAR; INTRAVENOUS; SUBCUTANEOUS at 13:25

## 2019-02-17 RX ADMIN — DEXTROSE MONOHYDRATE, SODIUM CHLORIDE, AND POTASSIUM CHLORIDE 100 ML/HR: 50; 4.5; 1.49 INJECTION, SOLUTION INTRAVENOUS at 06:04

## 2019-02-17 NOTE — PROGRESS NOTES
General Surgery End of Shift Nursing Note Bedside shift change report given to [facility] (oncoming nurse) by Kerrie Andrade RN (offgoing nurse). Report included the following information SBAR, Kardex, Intake/Output, MAR, Recent Results and Cardiac Rhythm SR, ST when having fever. Shift worked:   7p-7a Summary of shift:    Fever last evening, controlled with Tylenol, tachycardic with fever MEWS of 4 Dr. Izzy Balderas notified, [place] Spore done, encouraged pt to use I.S.more. Dilaudid effective for pain, bringing level of pain from \"6\" to \"5\", pt states 5 is managable, and crisis has to \"run its course\" CBC done. Hgh 7.2 Issues for physician to address:   none Number times ambulated in hallway past shift: 0 Number of times OOB to chair past shift: 0 Pain Management: 
Current medication: Dilaudid 1mg IVP Q 3h 
Patient states pain is manageable on current pain medication: YES 
 
GI: 
 
Current diet:  DIET REGULAR Tolerating current diet: YES Passing flatus: YES Last Bowel Movement: several days ago, awaiting Miralax results, declined PRN available Appearance:  
 
Respiratory: 
 
Incentive Spirometer at bedside: YES Patient instructed on use: YES Patient Safety: 
 
Falls Score: 1 Bed Alarm On? No 
Sitter?  No 
 
Deepika Houston RN

## 2019-02-17 NOTE — PROGRESS NOTES
Pharmacy Automatic Renal Dosing Protocol - Antimicrobials Indication for Antimicrobials: Empiric for fever, possible line infection Current Regimen of Each Antimicrobial: 
Vancomycin 1500 mg IV load followed by 900 mg IV every 8 hours (Start Date ; Day # 1) Ceftriaxone 1 g IV every 24 hours (Start Date ; Day # 1) Previous Antimicrobial Therapy: 
None Goal Level: VANCOMYCIN TROUGH GOAL RANGE Vancomycin Trough: 15 - 20 mcg/mL Date Dose & Interval Measured (mcg/mL) Extrapolated (mcg/mL) Date & time of next level:  @ 1300 or 2100 Significant Cultures:  
None currently Radiology / Imaging results: (X-ray, CT scan or MRI):  
Negative Paralysis, amputations, malnutrition: None documented Labs: 
Recent Labs  
  19 
0326 19 
0488 CREA  --  0.72 BUN  --  6 WBC 9.9 11.3* Temp (24hrs), Av.9 °F (38.3 °C), Min:99.3 °F (37.4 °C), Max:102.6 °F (39.2 °C) Creatinine Clearance (mL/min) or Dialysis: 94 mL/min Impression/Plan: · Will order vancomycin 1500 mg IV load followed by 900 mg IV every 8 hours for an estimated trough of 17 mcg/mL. · Will change ceftriaxone to 2 g IV every 24 hours for indication · Antimicrobial stop date pending Pharmacy will follow daily and adjust medications as appropriate for renal function and/or serum levels. Thank you, Poppy Lui, PHARMD 
 
Recommended duration of therapy 
http://[facility]/Pharmacy/Clinical%20Companion/Duration%20of%[email or web address]. docx Renal Dosing 
http://[facility]/[place]/[facility]/Pharmacy/Clinical%20Companion/Renal%[email or web address]. pdf

## 2019-02-17 NOTE — PROGRESS NOTES
RAPID RESPONSE TEAM 
 
Rounded on patient due to earlier elevated MEWS of 4. Discussed with primary RN Teri Gonzalez. No acute concerns. No patient complaints. Pain management has been effective. Fever has been controlled with tylenol. MEWS is now 1. No RRT interventions indicated at this time. RN encouraged to call with any questions or concerns. Angella Gums Rapid Response RN Guillermo Schrader

## 2019-02-17 NOTE — PROGRESS NOTES
General Daily Progress Note Admit Date: 2/10/2019 Subjective:  
 
Patient has no complaint . Current Facility-Administered Medications Medication Dose Route Frequency  cefTRIAXone (ROCEPHIN) 1 g in 0.9% sodium chloride (MBP/ADV) 50 mL  1 g IntraVENous Q24H  
 vancomycin (VANCOCIN) 1,000 mg in 0.9% sodium chloride (MBP/ADV) 250 mL  1,000 mg IntraVENous Q12H  
 0.9% sodium chloride infusion 250 mL  250 mL IntraVENous PRN  
 acetaminophen (TYLENOL) tablet 650 mg  650 mg Oral Q4H PRN  
 HYDROmorphone (PF) (DILAUDID) injection 1 mg  1 mg IntraVENous Q3H PRN  
 sodium chloride (NS) flush 10-30 mL  10-30 mL InterCATHeter PRN  
 sodium chloride (NS) flush 10-40 mL  10-40 mL InterCATHeter Q8H  
 losartan (COZAAR) tablet 50 mg  50 mg Oral DAILY  dextrose 5% - 0.45% NaCl with KCl 20 mEq/L infusion  100 mL/hr IntraVENous CONTINUOUS  
 citalopram (CELEXA) tablet 10 mg  10 mg Oral DAILY  diphenhydrAMINE (BENADRYL) capsule 50 mg  50 mg Oral DAILY PRN  
 fentaNYL (DURAGESIC) 75 mcg/hr patch 1 Patch  1 Patch TransDERmal Q72H  folic acid (FOLVITE) tablet 1 mg  1 mg Oral DAILY  sodium chloride (NS) flush 5-40 mL  5-40 mL IntraVENous Q8H  
 sodium chloride (NS) flush 5-40 mL  5-40 mL IntraVENous PRN  
 naloxone (NARCAN) injection 0.4 mg  0.4 mg IntraVENous PRN  
 ondansetron (ZOFRAN) injection 2 mg  2 mg IntraVENous Q6H PRN  
 bisacodyl (DULCOLAX) tablet 5 mg  5 mg Oral DAILY PRN  
 nicotine (NICODERM CQ) 7 mg/24 hr patch 1 Patch  1 Patch TransDERmal DAILY PRN  
 heparin (porcine) injection 5,000 Units  5,000 Units SubCUTAneous Q8H  
 diphenhydrAMINE (BENADRYL) injection 12.5 mg  12.5 mg IntraVENous Q6H PRN  polyethylene glycol (MIRALAX) packet 17 g  17 g Oral DAILY  pantoprazole (PROTONIX) tablet 40 mg  40 mg Oral ACB Review of Systems A comprehensive review of systems was negative. Objective:  
 
Patient Vitals for the past 24 hrs: 
 BP Temp Pulse Resp SpO2 Height Weight 02/17/19 0821 156/72 (!) 102.6 °F (39.2 °C) (!) 115 18 98 %    
02/17/19 0315 121/63 100.2 °F (37.9 °C) 91 18 94 %    
02/16/19 2313 95/63 99.5 °F (37.5 °C) 95 17 98 %    
02/16/19 2105  100.1 °F (37.8 °C)       
02/16/19 1937 133/71 (!) 102.4 °F (39.1 °C) (!) 103 18 96 %    
02/16/19 1755 137/83 (!) 102 °F (38.9 °C) (!) 103 18 99 %    
02/16/19 1742 154/79 (!) 101.2 °F (38.4 °C) 100 20 100 %    
02/16/19 1728 152/84 (!) 101.7 °F (38.7 °C) (!) 103 18 99 %    
02/16/19 1702 152/90 (!) 100.7 °F (38.2 °C) (!) 101 18 99 %    
02/16/19 1557 133/76 99.8 °F (37.7 °C) (!) 107 17 97 %    
02/16/19 1509      5' 10\" (1.778 m) 177 lb 11.1 oz (80.6 kg) 02/16/19 1228 124/53 99.3 °F (37.4 °C) 88 18 96 %   No intake/output data recorded. 02/15 1901 - 02/17 0700 In: 5476.7 [P.O.:2080; I.V.:3316.7] Out: - Physical Exam:  
Visit Vitals /72 (BP 1 Location: Left arm, BP Patient Position: Sitting) Pulse (!) 115 Temp (!) 102.6 °F (39.2 °C) Resp 18 Ht 5' 10\" (1.778 m) Wt 177 lb 11.1 oz (80.6 kg) SpO2 98% Breastfeeding? No  
BMI 25.50 kg/m² General appearance: alert, cooperative, no distress, appears stated age Neck: supple, symmetrical, trachea midline, no adenopathy, thyroid: not enlarged, symmetric, no tenderness/mass/nodules, no carotid bruit and no JVD Lungs: clear to auscultation bilaterally Abdomen: soft, non-tender. Bowel sounds normal. No masses,  no organomegaly Extremities: extremities normal, atraumatic, no cyanosis or edema Data Review Recent Results (from the past 24 hour(s)) TYPE + CROSSMATCH Collection Time: 02/16/19 12:40 PM  
Result Value Ref Range Crossmatch Expiration 02/15/2019 ABO/Rh(D) A NEGATIVE Antibody screen NEG Physician instructions [address] [facility] Unit number [id number] Blood component type [facility] Unit division 00 Status of unit TRANSFUSED ANTIGEN/ANTIBODY INFO C NEGATIVE, 
E NEGATIVE, LINDA NEGATIVE, Crossmatch result Compatible Unit number [id number] Blood component type [facility] Unit division 00 Status of unit REL FROM [facility] ANTIGEN/ANTIBODY INFO C NEGATIVE, 
E NEGATIVE, LINDA NEGATIVE, Crossmatch result Compatible TRANSFUSION REACTION Collection Time: 02/16/19  6:21 PM  
Result Value Ref Range Unit Number  [id number] Clerical Errors None detected Pre-txfusion hemolysis: NONE Post-txfusion hemolysis: NONE Blood bag hemolysis: NONE Direct Laith,pre-txfusion Not required Direct Laith,post-txfusion NEG PATHOLOGIST INTERP:    
  Symptoms and post transfusion work up are consistant with a febrile nonhemolytic transfusion reaction. Symptoms may be avoided by pretreating patient with an antipyretic. Dr. Ender Loyola, 2/17/19 Component Type RED CELL GROUP Blood type,post-txn A 
NEG 
   
TYPE & SCREEN Collection Time: 02/16/19  6:21 PM  
Result Value Ref Range Crossmatch Expiration 02/19/2019 ABO/Rh(D) A NEGATIVE Antibody screen NEG   
CBC WITH MANUAL DIFF Collection Time: 02/17/19  3:26 AM  
Result Value Ref Range WBC 9.9 3.6 - 11.0 K/uL  
 RBC 2.32 (L) 3.80 - 5.20 M/uL HGB 7.2 (L) 11.5 - 16.0 g/dL HCT 21.3 (L) 35.0 - 47.0 % MCV 91.8 80.0 - 99.0 FL  
 MCH 31.0 26.0 - 34.0 PG  
 MCHC 33.8 30.0 - 36.5 g/dL  
 RDW 18.2 (H) 11.5 - 14.5 % PLATELET 613 370 - 528 K/uL MPV 11.1 8.9 - 12.9 FL  
 NRBC 0.6 (H) 0  WBC ABSOLUTE NRBC 0.06 (H) 0.00 - 0.01 K/uL NEUTROPHILS 63 32 - 75 % BAND NEUTROPHILS 0 0 - 6 % LYMPHOCYTES 30 12 - 49 % MONOCYTES 4 (L) 5 - 13 % EOSINOPHILS 2 0 - 7 % BASOPHILS 0 0 - 1 % METAMYELOCYTES 1 (H) 0 % MYELOCYTES 0 0 % PROMYELOCYTES 0 0 % BLASTS 0 0 % OTHER CELL 0 0 IMMATURE GRANULOCYTES 0 %  
 ABS. NEUTROPHILS 6.2 1.8 - 8.0 K/UL  
 ABS. LYMPHOCYTES 3.0 0.8 - 3.5 K/UL  
 ABS. MONOCYTES 0.4 0.0 - 1.0 K/UL ABS. EOSINOPHILS 0.2 0.0 - 0.4 K/UL  
 ABS. BASOPHILS 0.0 0.0 - 0.1 K/UL  
 ABS. IMM. GRANS. 0.0 K/UL  
 DF MANUAL    
 RBC COMMENTS ANISOCYTOSIS 1+ 
    
 RBC COMMENTS MACROCYTOSIS 1+ 
    
 RBC COMMENTS HYPOCHROMIA 1+ 
    
 RBC COMMENTS TARGET CELLS 
PRESENT 
    
 RBC COMMENTS SICKLE CELLS 
PRESENT Assessment:  
 
Principal Problem: 
  Sickle cell disease ([facility] (2/10/2019) Active Problems: 
  Sickle cell anemia ([facility].) (10/3/2014) Hypertension (10/3/2014) Hepatitis C (10/3/2014) Depression (10/5/2014) Sickle cell pain crisis ([facility].) (9/8/2018) Plan: 1. Patient is spiked again. Hopefully the central line is not the source of her infection. I will await the results of her blood cultures but empiric treatment with moderate spectrum antibiotics for now. 2.  Continue treatment of painful crises. Under the current circumstances we will not transfuse for now.

## 2019-02-17 NOTE — PROGRESS NOTES
General Surgery End of Shift Nursing Note Bedside shift change report given to [id number] (oncoming nurse) by Travis Figueroa RN (offgoing nurse). Report included the following information SBAR. Shift worked:   7am-7pm  
Summary of shift:    Patient in bed resting quietly Compalining of Pain. Still has not had a BM since the 9th Issues for physician to address:     
 
Number times ambulated in hallway past shift: 2 Number of times OOB to chair past shift: 1 Pain Management: 
Current medication: Dilaudid Patient states pain is manageable on current pain medication: YES 
 
GI: 
 
Current diet:  DIET REGULAR Tolerating current diet: YES Passing flatus: YES Last Bowel Movement: several days ago Appearance:  
 
Respiratory: 
 
Incentive Spirometer at bedside: YES Patient instructed on use: YES Patient Safety: 
 
Falls Score: 1 Bed Alarm On? Not applicable Sitter? Not applicable Travis Figueroa

## 2019-02-18 LAB
ABO + RH BLD: NORMAL
ANION GAP SERPL CALC-SCNC: 3 MMOL/L (ref 5–15)
BASOPHILS # BLD: 0.1 K/UL (ref 0–0.1)
BASOPHILS NFR BLD: 1 % (ref 0–1)
BLASTS NFR BLD MANUAL: 0 %
BLOOD GROUP ANTIBODIES SERPL: NORMAL
BUN SERPL-MCNC: 6 MG/DL (ref 6–20)
BUN/CREAT SERPL: 9 (ref 12–20)
CALCIUM SERPL-MCNC: 8.1 MG/DL (ref 8.5–10.1)
CHLORIDE SERPL-SCNC: 105 MMOL/L (ref 97–108)
CO2 SERPL-SCNC: 30 MMOL/L (ref 21–32)
CREAT SERPL-MCNC: 0.66 MG/DL (ref 0.55–1.02)
EOSINOPHIL # BLD: 0.7 K/UL (ref 0–0.4)
EOSINOPHIL NFR BLD: 8 % (ref 0–7)
ERYTHROCYTE [DISTWIDTH] IN BLOOD BY AUTOMATED COUNT: 18.1 % (ref 11.5–14.5)
GLUCOSE SERPL-MCNC: 113 MG/DL (ref 65–100)
HCT VFR BLD AUTO: 19.3 % (ref 35–47)
HGB BLD-MCNC: 6.5 G/DL (ref 11.5–16)
IMM GRANULOCYTES # BLD AUTO: 0 K/UL
IMM GRANULOCYTES NFR BLD AUTO: 0 %
LYMPHOCYTES # BLD: 3 K/UL (ref 0.8–3.5)
LYMPHOCYTES NFR BLD: 35 % (ref 12–49)
MCH RBC QN AUTO: 31.3 PG (ref 26–34)
MCHC RBC AUTO-ENTMCNC: 33.7 G/DL (ref 30–36.5)
MCV RBC AUTO: 92.8 FL (ref 80–99)
METAMYELOCYTES NFR BLD MANUAL: 1 %
MONOCYTES # BLD: 0.9 K/UL (ref 0–1)
MONOCYTES NFR BLD: 10 % (ref 5–13)
MYELOCYTES NFR BLD MANUAL: 0 %
NEUTS BAND NFR BLD MANUAL: 2 % (ref 0–6)
NEUTS SEG # BLD: 3.8 K/UL (ref 1.8–8)
NEUTS SEG NFR BLD: 43 % (ref 32–75)
NRBC # BLD: 0.03 K/UL (ref 0–0.01)
NRBC BLD-RTO: 0.4 PER 100 WBC
OTHER CELLS NFR BLD MANUAL: 0 %
PLATELET # BLD AUTO: 189 K/UL (ref 150–400)
PMV BLD AUTO: 10.9 FL (ref 8.9–12.9)
POTASSIUM SERPL-SCNC: 4 MMOL/L (ref 3.5–5.1)
PROMYELOCYTES NFR BLD MANUAL: 0 %
RBC # BLD AUTO: 2.08 M/UL (ref 3.8–5.2)
RBC MORPH BLD: ABNORMAL
SODIUM SERPL-SCNC: 138 MMOL/L (ref 136–145)
SPECIMEN EXP DATE BLD: NORMAL
WBC # BLD AUTO: 8.5 K/UL (ref 3.6–11)

## 2019-02-18 PROCEDURE — 74011000258 HC RX REV CODE- 258: Performed by: INTERNAL MEDICINE

## 2019-02-18 PROCEDURE — 80048 BASIC METABOLIC PNL TOTAL CA: CPT

## 2019-02-18 PROCEDURE — 36415 COLL VENOUS BLD VENIPUNCTURE: CPT

## 2019-02-18 PROCEDURE — 85027 COMPLETE CBC AUTOMATED: CPT

## 2019-02-18 PROCEDURE — 74011250636 HC RX REV CODE- 250/636: Performed by: INTERNAL MEDICINE

## 2019-02-18 PROCEDURE — 74011250637 HC RX REV CODE- 250/637: Performed by: INTERNAL MEDICINE

## 2019-02-18 PROCEDURE — 65270000029 HC RM PRIVATE

## 2019-02-18 RX ORDER — SODIUM CHLORIDE 0.9 % (FLUSH) 0.9 %
SYRINGE (ML) INJECTION
Status: COMPLETED
Start: 2019-02-18 | End: 2019-02-18

## 2019-02-18 RX ADMIN — HYDROMORPHONE HYDROCHLORIDE 1 MG: 1 INJECTION, SOLUTION INTRAMUSCULAR; INTRAVENOUS; SUBCUTANEOUS at 08:48

## 2019-02-18 RX ADMIN — PANTOPRAZOLE SODIUM 40 MG: 40 TABLET, DELAYED RELEASE ORAL at 05:50

## 2019-02-18 RX ADMIN — VANCOMYCIN HYDROCHLORIDE 900 MG: 1 INJECTION, POWDER, LYOPHILIZED, FOR SOLUTION INTRAVENOUS at 14:19

## 2019-02-18 RX ADMIN — DIPHENHYDRAMINE HYDROCHLORIDE 12.5 MG: 50 INJECTION, SOLUTION INTRAMUSCULAR; INTRAVENOUS at 18:00

## 2019-02-18 RX ADMIN — Medication 30 ML: at 05:40

## 2019-02-18 RX ADMIN — HYDROMORPHONE HYDROCHLORIDE 1 MG: 1 INJECTION, SOLUTION INTRAMUSCULAR; INTRAVENOUS; SUBCUTANEOUS at 05:40

## 2019-02-18 RX ADMIN — LOSARTAN POTASSIUM 50 MG: 50 TABLET, FILM COATED ORAL at 08:48

## 2019-02-18 RX ADMIN — Medication 10 ML: at 11:55

## 2019-02-18 RX ADMIN — HEPARIN SODIUM 5000 UNITS: 5000 INJECTION INTRAVENOUS; SUBCUTANEOUS at 13:13

## 2019-02-18 RX ADMIN — HEPARIN SODIUM 5000 UNITS: 5000 INJECTION INTRAVENOUS; SUBCUTANEOUS at 05:48

## 2019-02-18 RX ADMIN — POLYETHYLENE GLYCOL 3350 17 G: 17 POWDER, FOR SOLUTION ORAL at 08:48

## 2019-02-18 RX ADMIN — Medication 20 ML: at 14:23

## 2019-02-18 RX ADMIN — HYDROMORPHONE HYDROCHLORIDE 1 MG: 1 INJECTION, SOLUTION INTRAMUSCULAR; INTRAVENOUS; SUBCUTANEOUS at 02:13

## 2019-02-18 RX ADMIN — VANCOMYCIN HYDROCHLORIDE 900 MG: 1 INJECTION, POWDER, LYOPHILIZED, FOR SOLUTION INTRAVENOUS at 21:37

## 2019-02-18 RX ADMIN — DEXTROSE MONOHYDRATE, SODIUM CHLORIDE, AND POTASSIUM CHLORIDE 100 ML/HR: 50; 4.5; 1.49 INJECTION, SOLUTION INTRAVENOUS at 09:53

## 2019-02-18 RX ADMIN — Medication 20 ML: at 11:56

## 2019-02-18 RX ADMIN — DIPHENHYDRAMINE HYDROCHLORIDE 12.5 MG: 50 INJECTION, SOLUTION INTRAMUSCULAR; INTRAVENOUS at 05:40

## 2019-02-18 RX ADMIN — HYDROMORPHONE HYDROCHLORIDE 1 MG: 1 INJECTION, SOLUTION INTRAMUSCULAR; INTRAVENOUS; SUBCUTANEOUS at 15:04

## 2019-02-18 RX ADMIN — HYDROMORPHONE HYDROCHLORIDE 1 MG: 1 INJECTION, SOLUTION INTRAMUSCULAR; INTRAVENOUS; SUBCUTANEOUS at 11:44

## 2019-02-18 RX ADMIN — CEFTRIAXONE SODIUM 2 G: 2 INJECTION, POWDER, FOR SOLUTION INTRAMUSCULAR; INTRAVENOUS at 11:44

## 2019-02-18 RX ADMIN — ONDANSETRON 2 MG: 2 INJECTION INTRAMUSCULAR; INTRAVENOUS at 11:44

## 2019-02-18 RX ADMIN — Medication 10 ML: at 05:41

## 2019-02-18 RX ADMIN — ONDANSETRON 2 MG: 2 INJECTION INTRAMUSCULAR; INTRAVENOUS at 18:00

## 2019-02-18 RX ADMIN — DIPHENHYDRAMINE HYDROCHLORIDE 12.5 MG: 50 INJECTION, SOLUTION INTRAMUSCULAR; INTRAVENOUS at 11:44

## 2019-02-18 RX ADMIN — VANCOMYCIN HYDROCHLORIDE 900 MG: 1 INJECTION, POWDER, LYOPHILIZED, FOR SOLUTION INTRAVENOUS at 04:16

## 2019-02-18 RX ADMIN — Medication 10 ML: at 21:00

## 2019-02-18 RX ADMIN — HYDROMORPHONE HYDROCHLORIDE 1 MG: 1 INJECTION, SOLUTION INTRAMUSCULAR; INTRAVENOUS; SUBCUTANEOUS at 20:59

## 2019-02-18 RX ADMIN — HYDROMORPHONE HYDROCHLORIDE 1 MG: 1 INJECTION, SOLUTION INTRAMUSCULAR; INTRAVENOUS; SUBCUTANEOUS at 18:00

## 2019-02-18 RX ADMIN — HEPARIN SODIUM 5000 UNITS: 5000 INJECTION INTRAVENOUS; SUBCUTANEOUS at 21:37

## 2019-02-18 RX ADMIN — CITALOPRAM HYDROBROMIDE 10 MG: 20 TABLET ORAL at 08:48

## 2019-02-18 RX ADMIN — FOLIC ACID 1 MG: 1 TABLET ORAL at 08:48

## 2019-02-18 RX ADMIN — ONDANSETRON 2 MG: 2 INJECTION INTRAMUSCULAR; INTRAVENOUS at 05:40

## 2019-02-18 NOTE — PROGRESS NOTES
General Daily Progress Note Admit Date: 2/10/2019 Subjective:  
 
Patient continues to complain of pain but no worse. Current Facility-Administered Medications Medication Dose Route Frequency  vancomycin (VANCOCIN) 900 mg in 0.9% sodium chloride 250 mL IVPB  900 mg IntraVENous Q8H  
 cefTRIAXone (ROCEPHIN) 2 g in 0.9% sodium chloride (MBP/ADV) 50 mL  2 g IntraVENous Q24H  
 0.9% sodium chloride infusion 250 mL  250 mL IntraVENous PRN  
 acetaminophen (TYLENOL) tablet 650 mg  650 mg Oral Q4H PRN  
 HYDROmorphone (PF) (DILAUDID) injection 1 mg  1 mg IntraVENous Q3H PRN  
 sodium chloride (NS) flush 10-30 mL  10-30 mL InterCATHeter PRN  
 sodium chloride (NS) flush 10-40 mL  10-40 mL InterCATHeter Q8H  
 losartan (COZAAR) tablet 50 mg  50 mg Oral DAILY  dextrose 5% - 0.45% NaCl with KCl 20 mEq/L infusion  100 mL/hr IntraVENous CONTINUOUS  
 citalopram (CELEXA) tablet 10 mg  10 mg Oral DAILY  diphenhydrAMINE (BENADRYL) capsule 50 mg  50 mg Oral DAILY PRN  
 fentaNYL (DURAGESIC) 75 mcg/hr patch 1 Patch  1 Patch TransDERmal Q72H  folic acid (FOLVITE) tablet 1 mg  1 mg Oral DAILY  sodium chloride (NS) flush 5-40 mL  5-40 mL IntraVENous Q8H  
 sodium chloride (NS) flush 5-40 mL  5-40 mL IntraVENous PRN  
 naloxone (NARCAN) injection 0.4 mg  0.4 mg IntraVENous PRN  
 ondansetron (ZOFRAN) injection 2 mg  2 mg IntraVENous Q6H PRN  
 bisacodyl (DULCOLAX) tablet 5 mg  5 mg Oral DAILY PRN  
 nicotine (NICODERM CQ) 7 mg/24 hr patch 1 Patch  1 Patch TransDERmal DAILY PRN  
 heparin (porcine) injection 5,000 Units  5,000 Units SubCUTAneous Q8H  
 diphenhydrAMINE (BENADRYL) injection 12.5 mg  12.5 mg IntraVENous Q6H PRN  polyethylene glycol (MIRALAX) packet 17 g  17 g Oral DAILY  pantoprazole (PROTONIX) tablet 40 mg  40 mg Oral ACB Review of Systems A comprehensive review of systems was negative. Objective:  
 
Patient Vitals for the past 24 hrs: 
 BP Temp Pulse Resp SpO2 02/18/19 0719 99/71 99.6 °F (37.6 °C) 97 18 90 % 02/18/19 0333 90/54 99.6 °F (37.6 °C) 91 18 95 % 02/17/19 2337 120/69 99.9 °F (37.7 °C) 94 18 94 % 02/17/19 1952 128/61 (!) 102.5 °F (39.2 °C) (!) 104 18 97 % 02/17/19 1549 108/72 100 °F (37.8 °C) 94 18 97 % 02/17/19 1258 125/69 99.9 °F (37.7 °C) 92 18 99 % No intake/output data recorded. 02/16 1901 - 02/18 0700 In: 36 [P.O.:980; I.V.:4220] Out: - Physical Exam:  
Visit Vitals BP 99/71 Pulse 97 Temp 99.6 °F (37.6 °C) Resp 18 Ht 5' 10\" (1.778 m) Wt 177 lb 11.1 oz (80.6 kg) SpO2 90% Breastfeeding? No  
BMI 25.50 kg/m² General appearance: alert, cooperative, no distress, appears stated age Neck: supple, symmetrical, trachea midline, no adenopathy, thyroid: not enlarged, symmetric, no tenderness/mass/nodules, no carotid bruit and no JVD Lungs: clear to auscultation bilaterally Heart: regular rate and rhythm, S1, S2 normal, no murmur, click, rub or gallop Abdomen: soft, non-tender. Bowel sounds normal. No masses,  no organomegaly Extremities: extremities normal, atraumatic, no cyanosis or edema Data Review Recent Results (from the past 24 hour(s)) CULTURE, BLOOD, PAIRED Collection Time: 02/17/19  4:45 PM  
Result Value Ref Range Special Requests: NO SPECIAL REQUESTS Culture result: NO GROWTH AFTER 13 HOURS    
CBC WITH MANUAL DIFF Collection Time: 02/18/19  3:42 AM  
Result Value Ref Range WBC 8.5 3.6 - 11.0 K/uL  
 RBC 2.08 (L) 3.80 - 5.20 M/uL HGB 6.5 (L) 11.5 - 16.0 g/dL HCT 19.3 (L) 35.0 - 47.0 % MCV 92.8 80.0 - 99.0 FL  
 MCH 31.3 26.0 - 34.0 PG  
 MCHC 33.7 30.0 - 36.5 g/dL  
 RDW 18.1 (H) 11.5 - 14.5 % PLATELET 644 586 - 119 K/uL MPV 10.9 8.9 - 12.9 FL  
 NRBC 0.4 (H) 0  WBC ABSOLUTE NRBC 0.03 (H) 0.00 - 0.01 K/uL NEUTROPHILS PENDING % LYMPHOCYTES PENDING % MONOCYTES PENDING % EOSINOPHILS PENDING % BASOPHILS PENDING % IMMATURE GRANULOCYTES PENDING % BAND NEUTROPHILS PENDING % PROMYELOCYTES PENDING % MYELOCYTES PENDING % METAMYELOCYTES PENDING % BLASTS PENDING % OTHER CELL PENDING   
 ABS. NEUTROPHILS PENDING K/UL  
 ABS. LYMPHOCYTES PENDING K/UL  
 ABS. MONOCYTES PENDING K/UL  
 ABS. EOSINOPHILS PENDING K/UL  
 ABS. BASOPHILS PENDING K/UL  
 ABS. IMM. GRANS. PENDING K/UL  
 RBC COMMENTS PENDING   
 DIFFERENTIAL PENDING   
METABOLIC PANEL, BASIC Collection Time: 02/18/19  3:42 AM  
Result Value Ref Range Sodium 138 136 - 145 mmol/L Potassium 4.0 3.5 - 5.1 mmol/L Chloride 105 97 - 108 mmol/L  
 CO2 30 21 - 32 mmol/L Anion gap 3 (L) 5 - 15 mmol/L Glucose 113 (H) 65 - 100 mg/dL BUN 6 6 - 20 MG/DL Creatinine 0.66 0.55 - 1.02 MG/DL  
 BUN/Creatinine ratio 9 (L) 12 - 20 GFR est AA >60 >60 ml/min/1.73m2 GFR est non-AA >60 >60 ml/min/1.73m2 Calcium 8.1 (L) 8.5 - 10.1 MG/DL Assessment:  
 
Principal Problem: 
  Sickle cell disease ([facility] (2/10/2019) Active Problems: 
  Sickle cell anemia ([facility] (10/3/2014) Hypertension (10/3/2014) Hepatitis C (10/3/2014) Depression (10/5/2014) Sickle cell pain crisis ([facility] (9/8/2018) Plan: 1. No further fever spikes. Culture negative thus far. Continue antibiotics until culture results return. He will have a concern about potential infection of the central line. 2.  Continue treatment of painful crises.

## 2019-02-18 NOTE — PROGRESS NOTES
RAPID RESPONSE TEAM 
  
Rounded on patient due to earlier elevated MEWS of 4.   
  
Discussed with primary RN Monique Vasquez. No acute concerns. No patient complaints. Pain management has been effective. Fever has been controlled with tylenol. MEWS is now 1.   
  
No RRT interventions indicated at this time.   
  
RN encouraged to call with any questions or concerns. 
  
[facility] Rapid Response RN Ext. [phone]

## 2019-02-18 NOTE — ROUTINE PROCESS
General Surgery End of Shift Nursing Note Bedside shift change report given to Sanjiv (oncoming nurse) by Cleve Lackey (offgoing nurse). Report included the following information SBAR, Kardex, Intake/Output and MAR. Shift worked:   7a-7p Summary of shift:    Patient is receiving dilaudid Q3 hours and Zofran and Benadryl Q6 hours. No other events to report Issues for physician to address:   n/a Number times ambulated in hallway past shift: 2 Number of times OOB to chair past shift: 0 Pain Management: 
Current medication: See [facility] Patient states pain is manageable on current pain medication: YES 
 
GI: 
 
Current diet:  DIET REGULAR Tolerating current diet: YES Passing flatus: YES Last Bowel Movement: today Appearance:  
 
Respiratory: 
 
Incentive Spirometer at bedside: YES Patient instructed on use: YES Patient Safety: 
 
Falls Score: 1 Bed Alarm On? No 
Sitter? No 
 
Jimi Rocha

## 2019-02-19 LAB
ANION GAP SERPL CALC-SCNC: 5 MMOL/L (ref 5–15)
BASOPHILS # BLD: 0.1 K/UL (ref 0–0.1)
BASOPHILS NFR BLD: 1 % (ref 0–1)
BLASTS NFR BLD MANUAL: 0 %
BUN SERPL-MCNC: 7 MG/DL (ref 6–20)
BUN/CREAT SERPL: 11 (ref 12–20)
CALCIUM SERPL-MCNC: 8.2 MG/DL (ref 8.5–10.1)
CHLORIDE SERPL-SCNC: 105 MMOL/L (ref 97–108)
CO2 SERPL-SCNC: 29 MMOL/L (ref 21–32)
CREAT SERPL-MCNC: 0.61 MG/DL (ref 0.55–1.02)
DIFFERENTIAL METHOD BLD: ABNORMAL
EOSINOPHIL # BLD: 0.7 K/UL (ref 0–0.4)
EOSINOPHIL NFR BLD: 8 % (ref 0–7)
ERYTHROCYTE [DISTWIDTH] IN BLOOD BY AUTOMATED COUNT: 17.3 % (ref 11.5–14.5)
GLUCOSE SERPL-MCNC: 87 MG/DL (ref 65–100)
HCT VFR BLD AUTO: 18.7 % (ref 35–47)
HGB BLD-MCNC: 6.3 G/DL (ref 11.5–16)
IMM GRANULOCYTES # BLD AUTO: 0 K/UL
IMM GRANULOCYTES NFR BLD AUTO: 0 %
LYMPHOCYTES # BLD: 4.2 K/UL (ref 0.8–3.5)
LYMPHOCYTES NFR BLD: 45 % (ref 12–49)
MCH RBC QN AUTO: 31.3 PG (ref 26–34)
MCHC RBC AUTO-ENTMCNC: 33.7 G/DL (ref 30–36.5)
MCV RBC AUTO: 93 FL (ref 80–99)
METAMYELOCYTES NFR BLD MANUAL: 0 %
MONOCYTES # BLD: 1.2 K/UL (ref 0–1)
MONOCYTES NFR BLD: 13 % (ref 5–13)
MYELOCYTES NFR BLD MANUAL: 0 %
NEUTS BAND NFR BLD MANUAL: 1 % (ref 0–6)
NEUTS SEG # BLD: 3.1 K/UL (ref 1.8–8)
NEUTS SEG NFR BLD: 32 % (ref 32–75)
NRBC # BLD: 0.03 K/UL (ref 0–0.01)
NRBC BLD-RTO: 0.3 PER 100 WBC
OTHER CELLS NFR BLD MANUAL: 0 %
PLATELET # BLD AUTO: 170 K/UL (ref 150–400)
PMV BLD AUTO: 11.3 FL (ref 8.9–12.9)
POTASSIUM SERPL-SCNC: 4.1 MMOL/L (ref 3.5–5.1)
PROMYELOCYTES NFR BLD MANUAL: 0 %
RBC # BLD AUTO: 2.01 M/UL (ref 3.8–5.2)
RBC MORPH BLD: ABNORMAL
SODIUM SERPL-SCNC: 139 MMOL/L (ref 136–145)
WBC # BLD AUTO: 9.3 K/UL (ref 3.6–11)

## 2019-02-19 PROCEDURE — 74011250636 HC RX REV CODE- 250/636: Performed by: INTERNAL MEDICINE

## 2019-02-19 PROCEDURE — 74011250637 HC RX REV CODE- 250/637: Performed by: INTERNAL MEDICINE

## 2019-02-19 PROCEDURE — 36415 COLL VENOUS BLD VENIPUNCTURE: CPT

## 2019-02-19 PROCEDURE — 65270000029 HC RM PRIVATE

## 2019-02-19 PROCEDURE — 74011000258 HC RX REV CODE- 258: Performed by: INTERNAL MEDICINE

## 2019-02-19 PROCEDURE — 80048 BASIC METABOLIC PNL TOTAL CA: CPT

## 2019-02-19 PROCEDURE — 85027 COMPLETE CBC AUTOMATED: CPT

## 2019-02-19 RX ADMIN — HYDROMORPHONE HYDROCHLORIDE 1 MG: 1 INJECTION, SOLUTION INTRAMUSCULAR; INTRAVENOUS; SUBCUTANEOUS at 00:06

## 2019-02-19 RX ADMIN — LOSARTAN POTASSIUM 50 MG: 50 TABLET, FILM COATED ORAL at 08:45

## 2019-02-19 RX ADMIN — ONDANSETRON 2 MG: 2 INJECTION INTRAMUSCULAR; INTRAVENOUS at 23:28

## 2019-02-19 RX ADMIN — ONDANSETRON 2 MG: 2 INJECTION INTRAMUSCULAR; INTRAVENOUS at 08:45

## 2019-02-19 RX ADMIN — HYDROMORPHONE HYDROCHLORIDE 1 MG: 1 INJECTION, SOLUTION INTRAMUSCULAR; INTRAVENOUS; SUBCUTANEOUS at 08:44

## 2019-02-19 RX ADMIN — HYDROMORPHONE HYDROCHLORIDE 1 MG: 1 INJECTION, SOLUTION INTRAMUSCULAR; INTRAVENOUS; SUBCUTANEOUS at 12:23

## 2019-02-19 RX ADMIN — DIPHENHYDRAMINE HYDROCHLORIDE 12.5 MG: 50 INJECTION, SOLUTION INTRAMUSCULAR; INTRAVENOUS at 08:45

## 2019-02-19 RX ADMIN — CITALOPRAM HYDROBROMIDE 10 MG: 20 TABLET ORAL at 08:44

## 2019-02-19 RX ADMIN — PANTOPRAZOLE SODIUM 40 MG: 40 TABLET, DELAYED RELEASE ORAL at 08:44

## 2019-02-19 RX ADMIN — DEXTROSE MONOHYDRATE, SODIUM CHLORIDE, AND POTASSIUM CHLORIDE 100 ML/HR: 50; 4.5; 1.49 INJECTION, SOLUTION INTRAVENOUS at 04:15

## 2019-02-19 RX ADMIN — HYDROMORPHONE HYDROCHLORIDE 1 MG: 1 INJECTION, SOLUTION INTRAMUSCULAR; INTRAVENOUS; SUBCUTANEOUS at 04:15

## 2019-02-19 RX ADMIN — HYDROMORPHONE HYDROCHLORIDE 1 MG: 1 INJECTION, SOLUTION INTRAMUSCULAR; INTRAVENOUS; SUBCUTANEOUS at 16:16

## 2019-02-19 RX ADMIN — VANCOMYCIN HYDROCHLORIDE 900 MG: 1 INJECTION, POWDER, LYOPHILIZED, FOR SOLUTION INTRAVENOUS at 22:40

## 2019-02-19 RX ADMIN — CEFTRIAXONE SODIUM 2 G: 2 INJECTION, POWDER, FOR SOLUTION INTRAMUSCULAR; INTRAVENOUS at 12:23

## 2019-02-19 RX ADMIN — DIPHENHYDRAMINE HYDROCHLORIDE 12.5 MG: 50 INJECTION, SOLUTION INTRAMUSCULAR; INTRAVENOUS at 23:31

## 2019-02-19 RX ADMIN — VANCOMYCIN HYDROCHLORIDE 900 MG: 1 INJECTION, POWDER, LYOPHILIZED, FOR SOLUTION INTRAVENOUS at 06:34

## 2019-02-19 RX ADMIN — Medication 10 ML: at 22:33

## 2019-02-19 RX ADMIN — HYDROMORPHONE HYDROCHLORIDE 1 MG: 1 INJECTION, SOLUTION INTRAMUSCULAR; INTRAVENOUS; SUBCUTANEOUS at 20:13

## 2019-02-19 RX ADMIN — Medication 10 ML: at 06:23

## 2019-02-19 RX ADMIN — HEPARIN SODIUM 5000 UNITS: 5000 INJECTION INTRAVENOUS; SUBCUTANEOUS at 23:35

## 2019-02-19 RX ADMIN — Medication 10 ML: at 16:00

## 2019-02-19 RX ADMIN — ONDANSETRON 2 MG: 2 INJECTION INTRAMUSCULAR; INTRAVENOUS at 00:07

## 2019-02-19 RX ADMIN — DIPHENHYDRAMINE HYDROCHLORIDE 12.5 MG: 50 INJECTION, SOLUTION INTRAMUSCULAR; INTRAVENOUS at 00:06

## 2019-02-19 RX ADMIN — POLYETHYLENE GLYCOL 3350 17 G: 17 POWDER, FOR SOLUTION ORAL at 08:44

## 2019-02-19 RX ADMIN — HYDROMORPHONE HYDROCHLORIDE 1 MG: 1 INJECTION, SOLUTION INTRAMUSCULAR; INTRAVENOUS; SUBCUTANEOUS at 23:28

## 2019-02-19 RX ADMIN — Medication 10 ML: at 22:25

## 2019-02-19 RX ADMIN — VANCOMYCIN HYDROCHLORIDE 900 MG: 1 INJECTION, POWDER, LYOPHILIZED, FOR SOLUTION INTRAVENOUS at 16:00

## 2019-02-19 RX ADMIN — Medication 20 ML: at 16:00

## 2019-02-19 RX ADMIN — DIPHENHYDRAMINE HYDROCHLORIDE 12.5 MG: 50 INJECTION, SOLUTION INTRAMUSCULAR; INTRAVENOUS at 16:15

## 2019-02-19 RX ADMIN — DEXTROSE MONOHYDRATE, SODIUM CHLORIDE, AND POTASSIUM CHLORIDE 100 ML/HR: 50; 4.5; 1.49 INJECTION, SOLUTION INTRAVENOUS at 16:00

## 2019-02-19 RX ADMIN — ONDANSETRON 2 MG: 2 INJECTION INTRAMUSCULAR; INTRAVENOUS at 16:16

## 2019-02-19 RX ADMIN — HEPARIN SODIUM 5000 UNITS: 5000 INJECTION INTRAVENOUS; SUBCUTANEOUS at 06:34

## 2019-02-19 RX ADMIN — HEPARIN SODIUM 5000 UNITS: 5000 INJECTION INTRAVENOUS; SUBCUTANEOUS at 16:15

## 2019-02-19 RX ADMIN — FOLIC ACID 1 MG: 1 TABLET ORAL at 08:45

## 2019-02-19 NOTE — PROGRESS NOTES
Problem: Pain Goal: *Control of Pain Outcome: Not Progressing Towards Goal 
Pt requires pain medication every 3 hours.

## 2019-02-20 LAB
BASOPHILS # BLD: 0.1 K/UL (ref 0–0.1)
BASOPHILS NFR BLD: 1 % (ref 0–1)
BLASTS NFR BLD MANUAL: 0 %
DATE LAST DOSE: ABNORMAL
DIFFERENTIAL METHOD BLD: ABNORMAL
EOSINOPHIL # BLD: 0.3 K/UL (ref 0–0.4)
EOSINOPHIL NFR BLD: 3 % (ref 0–7)
ERYTHROCYTE [DISTWIDTH] IN BLOOD BY AUTOMATED COUNT: 17.2 % (ref 11.5–14.5)
HCT VFR BLD AUTO: 20.2 % (ref 35–47)
HGB BLD-MCNC: 7 G/DL (ref 11.5–16)
IMM GRANULOCYTES # BLD AUTO: 0 K/UL
IMM GRANULOCYTES NFR BLD AUTO: 0 %
LYMPHOCYTES # BLD: 3.1 K/UL (ref 0.8–3.5)
LYMPHOCYTES NFR BLD: 27 % (ref 12–49)
MCH RBC QN AUTO: 31.8 PG (ref 26–34)
MCHC RBC AUTO-ENTMCNC: 34.7 G/DL (ref 30–36.5)
MCV RBC AUTO: 91.8 FL (ref 80–99)
METAMYELOCYTES NFR BLD MANUAL: 0 %
MONOCYTES # BLD: 0.6 K/UL (ref 0–1)
MONOCYTES NFR BLD: 5 % (ref 5–13)
MYELOCYTES NFR BLD MANUAL: 0 %
NEUTS BAND NFR BLD MANUAL: 0 % (ref 0–6)
NEUTS SEG # BLD: 7.5 K/UL (ref 1.8–8)
NEUTS SEG NFR BLD: 64 % (ref 32–75)
NRBC # BLD: 0.03 K/UL (ref 0–0.01)
NRBC BLD-RTO: 0.3 PER 100 WBC
OTHER CELLS NFR BLD MANUAL: 0 %
PLATELET # BLD AUTO: 207 K/UL (ref 150–400)
PMV BLD AUTO: 11.4 FL (ref 8.9–12.9)
PROMYELOCYTES NFR BLD MANUAL: 0 %
RBC # BLD AUTO: 2.2 M/UL (ref 3.8–5.2)
RBC MORPH BLD: ABNORMAL
REPORTED DOSE,DOSE: ABNORMAL UNITS
REPORTED DOSE/TIME,TMG: 2200
VANCOMYCIN TROUGH SERPL-MCNC: 11.1 UG/ML (ref 5–10)
WBC # BLD AUTO: 11.6 K/UL (ref 3.6–11)

## 2019-02-20 PROCEDURE — 36415 COLL VENOUS BLD VENIPUNCTURE: CPT

## 2019-02-20 PROCEDURE — 74011250637 HC RX REV CODE- 250/637: Performed by: INTERNAL MEDICINE

## 2019-02-20 PROCEDURE — 74011000258 HC RX REV CODE- 258: Performed by: INTERNAL MEDICINE

## 2019-02-20 PROCEDURE — 65270000029 HC RM PRIVATE

## 2019-02-20 PROCEDURE — 74011250636 HC RX REV CODE- 250/636: Performed by: INTERNAL MEDICINE

## 2019-02-20 PROCEDURE — 85027 COMPLETE CBC AUTOMATED: CPT

## 2019-02-20 PROCEDURE — 80202 ASSAY OF VANCOMYCIN: CPT

## 2019-02-20 RX ORDER — VANCOMYCIN HYDROCHLORIDE
1250 EVERY 8 HOURS
Status: DISCONTINUED | OUTPATIENT
Start: 2019-02-20 | End: 2019-02-22

## 2019-02-20 RX ADMIN — VANCOMYCIN HYDROCHLORIDE 1250 MG: 10 INJECTION, POWDER, LYOPHILIZED, FOR SOLUTION INTRAVENOUS at 21:35

## 2019-02-20 RX ADMIN — DEXTROSE MONOHYDRATE, SODIUM CHLORIDE, AND POTASSIUM CHLORIDE 100 ML/HR: 50; 4.5; 1.49 INJECTION, SOLUTION INTRAVENOUS at 18:49

## 2019-02-20 RX ADMIN — Medication 10 ML: at 06:33

## 2019-02-20 RX ADMIN — HEPARIN SODIUM 5000 UNITS: 5000 INJECTION INTRAVENOUS; SUBCUTANEOUS at 21:35

## 2019-02-20 RX ADMIN — HEPARIN SODIUM 5000 UNITS: 5000 INJECTION INTRAVENOUS; SUBCUTANEOUS at 14:42

## 2019-02-20 RX ADMIN — HYDROMORPHONE HYDROCHLORIDE 1 MG: 1 INJECTION, SOLUTION INTRAMUSCULAR; INTRAVENOUS; SUBCUTANEOUS at 09:04

## 2019-02-20 RX ADMIN — VANCOMYCIN HYDROCHLORIDE 900 MG: 1 INJECTION, POWDER, LYOPHILIZED, FOR SOLUTION INTRAVENOUS at 05:41

## 2019-02-20 RX ADMIN — HYDROMORPHONE HYDROCHLORIDE 1 MG: 1 INJECTION, SOLUTION INTRAMUSCULAR; INTRAVENOUS; SUBCUTANEOUS at 12:09

## 2019-02-20 RX ADMIN — Medication 10 ML: at 22:41

## 2019-02-20 RX ADMIN — DIPHENHYDRAMINE HYDROCHLORIDE 12.5 MG: 50 INJECTION, SOLUTION INTRAMUSCULAR; INTRAVENOUS at 05:19

## 2019-02-20 RX ADMIN — POLYETHYLENE GLYCOL 3350 17 G: 17 POWDER, FOR SOLUTION ORAL at 09:05

## 2019-02-20 RX ADMIN — HYDROMORPHONE HYDROCHLORIDE 1 MG: 1 INJECTION, SOLUTION INTRAMUSCULAR; INTRAVENOUS; SUBCUTANEOUS at 21:35

## 2019-02-20 RX ADMIN — FOLIC ACID 1 MG: 1 TABLET ORAL at 09:05

## 2019-02-20 RX ADMIN — DIPHENHYDRAMINE HYDROCHLORIDE 12.5 MG: 50 INJECTION, SOLUTION INTRAMUSCULAR; INTRAVENOUS at 12:13

## 2019-02-20 RX ADMIN — DIPHENHYDRAMINE HYDROCHLORIDE 12.5 MG: 50 INJECTION, SOLUTION INTRAMUSCULAR; INTRAVENOUS at 18:35

## 2019-02-20 RX ADMIN — PANTOPRAZOLE SODIUM 40 MG: 40 TABLET, DELAYED RELEASE ORAL at 06:58

## 2019-02-20 RX ADMIN — CITALOPRAM HYDROBROMIDE 10 MG: 20 TABLET ORAL at 09:04

## 2019-02-20 RX ADMIN — HYDROMORPHONE HYDROCHLORIDE 1 MG: 1 INJECTION, SOLUTION INTRAMUSCULAR; INTRAVENOUS; SUBCUTANEOUS at 02:39

## 2019-02-20 RX ADMIN — Medication 10 ML: at 18:34

## 2019-02-20 RX ADMIN — HYDROMORPHONE HYDROCHLORIDE 1 MG: 1 INJECTION, SOLUTION INTRAMUSCULAR; INTRAVENOUS; SUBCUTANEOUS at 18:34

## 2019-02-20 RX ADMIN — HYDROMORPHONE HYDROCHLORIDE 1 MG: 1 INJECTION, SOLUTION INTRAMUSCULAR; INTRAVENOUS; SUBCUTANEOUS at 14:59

## 2019-02-20 RX ADMIN — Medication 10 ML: at 18:33

## 2019-02-20 RX ADMIN — ACETAMINOPHEN 650 MG: 325 TABLET ORAL at 20:45

## 2019-02-20 RX ADMIN — ONDANSETRON 2 MG: 2 INJECTION INTRAMUSCULAR; INTRAVENOUS at 05:18

## 2019-02-20 RX ADMIN — ONDANSETRON 2 MG: 2 INJECTION INTRAMUSCULAR; INTRAVENOUS at 18:35

## 2019-02-20 RX ADMIN — CEFTRIAXONE SODIUM 2 G: 2 INJECTION, POWDER, FOR SOLUTION INTRAMUSCULAR; INTRAVENOUS at 11:25

## 2019-02-20 RX ADMIN — LOSARTAN POTASSIUM 50 MG: 50 TABLET, FILM COATED ORAL at 09:04

## 2019-02-20 RX ADMIN — VANCOMYCIN HYDROCHLORIDE 1250 MG: 10 INJECTION, POWDER, LYOPHILIZED, FOR SOLUTION INTRAVENOUS at 14:44

## 2019-02-20 RX ADMIN — HEPARIN SODIUM 5000 UNITS: 5000 INJECTION INTRAVENOUS; SUBCUTANEOUS at 05:19

## 2019-02-20 RX ADMIN — Medication 10 ML: at 05:26

## 2019-02-20 RX ADMIN — ONDANSETRON 2 MG: 2 INJECTION INTRAMUSCULAR; INTRAVENOUS at 12:10

## 2019-02-20 RX ADMIN — HYDROMORPHONE HYDROCHLORIDE 1 MG: 1 INJECTION, SOLUTION INTRAMUSCULAR; INTRAVENOUS; SUBCUTANEOUS at 05:18

## 2019-02-20 NOTE — PROGRESS NOTES
Pharmacy Automatic Renal Dosing Protocol - Antimicrobials Indication for Antimicrobials: Empiric for fever, possible line infection Current Regimen of Each Antimicrobial: 
Vancomycin 1500 mg IV load followed by 1.25 g IV every 8 hours (Start Date ; Day #4) Ceftriaxone 1 g IV every 24 hours (Start Date ; Day #4) Previous Antimicrobial Therapy: 
None Goal Level: VANCOMYCIN TROUGH GOAL RANGE Vancomycin Trough: 15 - 20 mcg/mL Date Dose & Interval Measured (mcg/mL) Extrapolated (mcg/mL)  
Romeo@[email or web address] 900 mg IV q8h 11.1 9.6 Date & time of next level:  
19 prior to 0400 dose? Significant Cultures:  
19 PBC- [id number] Radiology / Imaging results: (X-ray, CT scan or MRI):  
Negative Paralysis, amputations, malnutrition: None documented Labs: 
Recent Labs  
  19 
0415 19 
0342 19 
0326 CREA 0.61 0.66  --   
BUN 7 6  --   
WBC 9.3 8.5 9.9 Temp (24hrs), Av.4 °F (37.4 °C), Min:98.6 °F (37 °C), Max:99.9 °F (37.7 °C) Creatinine Clearance (mL/min) or Dialysis: 94 mL/min Impression/Plan: · Received requested trough prior to  0600 dose= 11.1 (9.6), sub therapeutic, adjusted regimen to achieve a theoretic trough ~15 
· Will change ceftriaxone to 2 g IV every 24 hours for indication · Antimicrobial stop date pending · Follow- up level  0400 dose if deemed appropriate Pharmacy will follow daily and adjust medications as appropriate for renal function and/or serum levels. Recommended duration of therapy 
[email or web address]/[place]/[facility]/Pharmacy/Clinical%20Companion/Duration%20of%20ABX%20therapy. docx Renal Dosing 
http://[facility]/[place]/[facility]/Pharmacy/Clinical%20Companion/Renal%[email or web address]. pdf

## 2019-02-20 NOTE — CDMP QUERY
Account Number: [de-identified] MRN: [id number] Patient: Rhina Boeck Created: [id number] Clinician Name: Alfred Mesa MD : 
Patient admitted with sc crisis, noted to have wbc 11.6, bands 2, temp 100.9, hr 95--106. If possible, please document in progress notes and d/c summary if you are evaluating and/or treating any of the following:  
 
=> Sirs d/t SC crisis 
=> Other explanation of clinical findings  
=> Clinically Undetermined (no explanation for clinical findings) The medical record reflects the following: 
   Risk Factors: adm w/ sc crisis, has PICC Clinical Indicators: wbc 11.6, bands 2, temp 100.9, hr 95--106 Treatment: ivfs@ 100, Rocephin, cx still pending Thank Amanda Arellano RN/CDMP

## 2019-02-20 NOTE — PROGRESS NOTES
General Surgery End of Shift Nursing Note Bedside shift change report given to Roc [address]  (oncoming nurse) by Milad Villanueva RN  (offgoing nurse). Report included the following information SBAR, Kardex, Intake/Output and MAR. Shift worked:   [id number] Summary of shift:     
Issues for physician to address:    
 
Number times ambulated in hallway past shift: 0 Number of times OOB to chair past shift: 2 Pain Management: 
Current medication:  
Patient states pain is manageable on current pain medication: YES 
 
GI: 
 
Current diet:  DIET REGULAR Tolerating current diet: YES Passing flatus: YES Last Bowel Movement: yesterday Appearance:  
 
 
Respiratory: 
 
Incentive Spirometer at bedside: YES Patient instructed on use: YES Patient Safety: 
 
Falls Score: 1 Bed Alarm On? No 
Sitter?  No 
 
Bel Faustin, RN

## 2019-02-20 NOTE — PROGRESS NOTES
General Daily Progress Note Admit Date: 2/10/2019 Subjective:  
 
Patient continues to complain of pain. Current Facility-Administered Medications Medication Dose Route Frequency  vancomycin (VANCOCIN) 1250 mg in  ml infusion  1,250 mg IntraVENous Q8H  
 cefTRIAXone (ROCEPHIN) 2 g in 0.9% sodium chloride (MBP/ADV) 50 mL  2 g IntraVENous Q24H  
 0.9% sodium chloride infusion 250 mL  250 mL IntraVENous PRN  
 acetaminophen (TYLENOL) tablet 650 mg  650 mg Oral Q4H PRN  
 HYDROmorphone (PF) (DILAUDID) injection 1 mg  1 mg IntraVENous Q3H PRN  
 sodium chloride (NS) flush 10-30 mL  10-30 mL InterCATHeter PRN  
 sodium chloride (NS) flush 10-40 mL  10-40 mL InterCATHeter Q8H  
 losartan (COZAAR) tablet 50 mg  50 mg Oral DAILY  dextrose 5% - 0.45% NaCl with KCl 20 mEq/L infusion  100 mL/hr IntraVENous CONTINUOUS  
 citalopram (CELEXA) tablet 10 mg  10 mg Oral DAILY  diphenhydrAMINE (BENADRYL) capsule 50 mg  50 mg Oral DAILY PRN  
 fentaNYL (DURAGESIC) 75 mcg/hr patch 1 Patch  1 Patch TransDERmal Q72H  folic acid (FOLVITE) tablet 1 mg  1 mg Oral DAILY  sodium chloride (NS) flush 5-40 mL  5-40 mL IntraVENous Q8H  
 sodium chloride (NS) flush 5-40 mL  5-40 mL IntraVENous PRN  
 naloxone (NARCAN) injection 0.4 mg  0.4 mg IntraVENous PRN  
 ondansetron (ZOFRAN) injection 2 mg  2 mg IntraVENous Q6H PRN  
 bisacodyl (DULCOLAX) tablet 5 mg  5 mg Oral DAILY PRN  
 nicotine (NICODERM CQ) 7 mg/24 hr patch 1 Patch  1 Patch TransDERmal DAILY PRN  
 heparin (porcine) injection 5,000 Units  5,000 Units SubCUTAneous Q8H  
 diphenhydrAMINE (BENADRYL) injection 12.5 mg  12.5 mg IntraVENous Q6H PRN  polyethylene glycol (MIRALAX) packet 17 g  17 g Oral DAILY  pantoprazole (PROTONIX) tablet 40 mg  40 mg Oral ACB Review of Systems A comprehensive review of systems was negative. Objective:  
 
Patient Vitals for the past 24 hrs: 
 BP Temp Pulse Resp SpO2 02/20/19 0818 116/51 (!) 100.8 °F (38.2 °C) 93 17 95 % 02/20/19 0400 119/73 100 °F (37.8 °C) 82 16 95 % 02/19/19 2340 129/74 98.2 °F (36.8 °C) 83 18 98 % 02/19/19 1848 116/67 99.1 °F (37.3 °C) 86 18 100 % 02/19/19 1639 114/57 99.4 °F (37.4 °C) 80 18 100 % 02/19/19 1134 121/61 99.7 °F (37.6 °C) 88 18 98 % No intake/output data recorded. 02/18 1901 - 02/20 0700 In: 6280 [P.O.:2120; I.V.:4160] Out: 750 [Urine:750] Physical Exam:  
Visit Vitals /51 Pulse 93 Temp (!) 100.8 °F (38.2 °C) Resp 17 Ht 5' 10\" (1.778 m) Wt 177 lb 11.1 oz (80.6 kg) SpO2 95% Breastfeeding? No  
BMI 25.50 kg/m² General appearance: alert, cooperative, no distress, appears stated age Neck: supple, symmetrical, trachea midline, no adenopathy, thyroid: not enlarged, symmetric, no tenderness/mass/nodules, no carotid bruit and no JVD Lungs: clear to auscultation bilaterally Heart: regular rate and rhythm, S1, S2 normal, no murmur, click, rub or gallop Abdomen: soft, non-tender. Bowel sounds normal. No masses,  no organomegaly Extremities: extremities normal, atraumatic, no cyanosis or edema Data Review Recent Results (from the past 24 hour(s)) CBC WITH MANUAL DIFF Collection Time: 02/20/19  5:30 AM  
Result Value Ref Range WBC 11.6 (H) 3.6 - 11.0 K/uL  
 RBC 2.20 (L) 3.80 - 5.20 M/uL HGB 7.0 (L) 11.5 - 16.0 g/dL HCT 20.2 (L) 35.0 - 47.0 % MCV 91.8 80.0 - 99.0 FL  
 MCH 31.8 26.0 - 34.0 PG  
 MCHC 34.7 30.0 - 36.5 g/dL  
 RDW 17.2 (H) 11.5 - 14.5 % PLATELET 637 717 - 597 K/uL MPV 11.4 8.9 - 12.9 FL  
 NRBC 0.3 (H) 0  WBC ABSOLUTE NRBC 0.03 (H) 0.00 - 0.01 K/uL NEUTROPHILS 64 32 - 75 % BAND NEUTROPHILS 0 0 - 6 % LYMPHOCYTES 27 12 - 49 % MONOCYTES 5 5 - 13 % EOSINOPHILS 3 0 - 7 % BASOPHILS 1 0 - 1 % METAMYELOCYTES 0 0 % MYELOCYTES 0 0 % PROMYELOCYTES 0 0 % BLASTS 0 0 % OTHER CELL 0 0 IMMATURE GRANULOCYTES 0 % ABS. NEUTROPHILS 7.5 1.8 - 8.0 K/UL  
 ABS. LYMPHOCYTES 3.1 0.8 - 3.5 K/UL  
 ABS. MONOCYTES 0.6 0.0 - 1.0 K/UL  
 ABS. EOSINOPHILS 0.3 0.0 - 0.4 K/UL  
 ABS. BASOPHILS 0.1 0.0 - 0.1 K/UL  
 ABS. IMM. GRANS. 0.0 K/UL  
 DF MANUAL    
 RBC COMMENTS ANISOCYTOSIS 1+ 
    
 RBC COMMENTS TARGET CELLS 2+ 
    
 RBC COMMENTS SICKLE CELLS 
PRESENT 
    
VANCOMYCIN, TROUGH Collection Time: 02/20/19  5:30 AM  
Result Value Ref Range Vancomycin,trough 11.1 (H) 5.0 - 10.0 ug/mL Reported dose date: [id number] Reported dose time: 2200 Reported dose: 900 MG UNITS Assessment:  
 
Principal Problem: 
  Sickle cell disease ([facility] (2/10/2019) Active Problems: 
  Sickle cell anemia ([facility].) (10/3/2014) Hypertension (10/3/2014) Hepatitis C (10/3/2014) Depression (10/5/2014) Sickle cell pain crisis ([facility].) (9/8/2018) Plan: 1. Continue treatment of painful crises. Hemoglobin now 7. 
2.  Continues with low-grade fever. Cultures remain negative thus far. 3.  Will mobilize.

## 2019-02-21 LAB
ANION GAP SERPL CALC-SCNC: 3 MMOL/L (ref 5–15)
BUN SERPL-MCNC: 7 MG/DL (ref 6–20)
BUN/CREAT SERPL: 9 (ref 12–20)
CALCIUM SERPL-MCNC: 7.7 MG/DL (ref 8.5–10.1)
CHLORIDE SERPL-SCNC: 103 MMOL/L (ref 97–108)
CO2 SERPL-SCNC: 29 MMOL/L (ref 21–32)
CREAT SERPL-MCNC: 0.74 MG/DL (ref 0.55–1.02)
GLUCOSE SERPL-MCNC: 110 MG/DL (ref 65–100)
POTASSIUM SERPL-SCNC: 4 MMOL/L (ref 3.5–5.1)
SODIUM SERPL-SCNC: 135 MMOL/L (ref 136–145)

## 2019-02-21 PROCEDURE — 80048 BASIC METABOLIC PNL TOTAL CA: CPT

## 2019-02-21 PROCEDURE — 74011250637 HC RX REV CODE- 250/637: Performed by: INTERNAL MEDICINE

## 2019-02-21 PROCEDURE — 74011250636 HC RX REV CODE- 250/636: Performed by: INTERNAL MEDICINE

## 2019-02-21 PROCEDURE — 65270000029 HC RM PRIVATE

## 2019-02-21 PROCEDURE — 36415 COLL VENOUS BLD VENIPUNCTURE: CPT

## 2019-02-21 PROCEDURE — 74011000258 HC RX REV CODE- 258: Performed by: INTERNAL MEDICINE

## 2019-02-21 RX ORDER — HYDROCODONE BITARTRATE AND ACETAMINOPHEN 7.5; 325 MG/1; MG/1
1 TABLET ORAL
Status: DISCONTINUED | OUTPATIENT
Start: 2019-02-21 | End: 2019-02-27

## 2019-02-21 RX ADMIN — DIPHENHYDRAMINE HYDROCHLORIDE 12.5 MG: 50 INJECTION, SOLUTION INTRAMUSCULAR; INTRAVENOUS at 13:11

## 2019-02-21 RX ADMIN — HEPARIN SODIUM 5000 UNITS: 5000 INJECTION INTRAVENOUS; SUBCUTANEOUS at 05:35

## 2019-02-21 RX ADMIN — CITALOPRAM HYDROBROMIDE 10 MG: 20 TABLET ORAL at 07:57

## 2019-02-21 RX ADMIN — VANCOMYCIN HYDROCHLORIDE 1250 MG: 10 INJECTION, POWDER, LYOPHILIZED, FOR SOLUTION INTRAVENOUS at 21:35

## 2019-02-21 RX ADMIN — Medication 10 ML: at 06:10

## 2019-02-21 RX ADMIN — HYDROMORPHONE HYDROCHLORIDE 1 MG: 1 INJECTION, SOLUTION INTRAMUSCULAR; INTRAVENOUS; SUBCUTANEOUS at 03:46

## 2019-02-21 RX ADMIN — ONDANSETRON 2 MG: 2 INJECTION INTRAMUSCULAR; INTRAVENOUS at 18:38

## 2019-02-21 RX ADMIN — LOSARTAN POTASSIUM 50 MG: 50 TABLET, FILM COATED ORAL at 07:56

## 2019-02-21 RX ADMIN — HYDROMORPHONE HYDROCHLORIDE 1 MG: 1 INJECTION, SOLUTION INTRAMUSCULAR; INTRAVENOUS; SUBCUTANEOUS at 21:34

## 2019-02-21 RX ADMIN — DIPHENHYDRAMINE HYDROCHLORIDE 12.5 MG: 50 INJECTION, SOLUTION INTRAMUSCULAR; INTRAVENOUS at 00:44

## 2019-02-21 RX ADMIN — Medication 10 ML: at 21:35

## 2019-02-21 RX ADMIN — ONDANSETRON 2 MG: 2 INJECTION INTRAMUSCULAR; INTRAVENOUS at 13:10

## 2019-02-21 RX ADMIN — ONDANSETRON 2 MG: 2 INJECTION INTRAMUSCULAR; INTRAVENOUS at 00:44

## 2019-02-21 RX ADMIN — HYDROMORPHONE HYDROCHLORIDE 1 MG: 1 INJECTION, SOLUTION INTRAMUSCULAR; INTRAVENOUS; SUBCUTANEOUS at 06:53

## 2019-02-21 RX ADMIN — DEXTROSE MONOHYDRATE, SODIUM CHLORIDE, AND POTASSIUM CHLORIDE 100 ML/HR: 50; 4.5; 1.49 INJECTION, SOLUTION INTRAVENOUS at 15:54

## 2019-02-21 RX ADMIN — HYDROMORPHONE HYDROCHLORIDE 1 MG: 1 INJECTION, SOLUTION INTRAMUSCULAR; INTRAVENOUS; SUBCUTANEOUS at 13:11

## 2019-02-21 RX ADMIN — HEPARIN SODIUM 5000 UNITS: 5000 INJECTION INTRAVENOUS; SUBCUTANEOUS at 21:34

## 2019-02-21 RX ADMIN — HYDROMORPHONE HYDROCHLORIDE 1 MG: 1 INJECTION, SOLUTION INTRAMUSCULAR; INTRAVENOUS; SUBCUTANEOUS at 18:38

## 2019-02-21 RX ADMIN — BISACODYL 5 MG: 5 TABLET, COATED ORAL at 08:01

## 2019-02-21 RX ADMIN — HYDROMORPHONE HYDROCHLORIDE 1 MG: 1 INJECTION, SOLUTION INTRAMUSCULAR; INTRAVENOUS; SUBCUTANEOUS at 15:54

## 2019-02-21 RX ADMIN — HYDROMORPHONE HYDROCHLORIDE 1 MG: 1 INJECTION, SOLUTION INTRAMUSCULAR; INTRAVENOUS; SUBCUTANEOUS at 09:54

## 2019-02-21 RX ADMIN — DIPHENHYDRAMINE HYDROCHLORIDE 12.5 MG: 50 INJECTION, SOLUTION INTRAMUSCULAR; INTRAVENOUS at 18:38

## 2019-02-21 RX ADMIN — ONDANSETRON 2 MG: 2 INJECTION INTRAMUSCULAR; INTRAVENOUS at 06:53

## 2019-02-21 RX ADMIN — CEFTRIAXONE SODIUM 2 G: 2 INJECTION, POWDER, FOR SOLUTION INTRAMUSCULAR; INTRAVENOUS at 12:12

## 2019-02-21 RX ADMIN — HEPARIN SODIUM 5000 UNITS: 5000 INJECTION INTRAVENOUS; SUBCUTANEOUS at 15:04

## 2019-02-21 RX ADMIN — POLYETHYLENE GLYCOL 3350 17 G: 17 POWDER, FOR SOLUTION ORAL at 07:55

## 2019-02-21 RX ADMIN — PANTOPRAZOLE SODIUM 40 MG: 40 TABLET, DELAYED RELEASE ORAL at 06:39

## 2019-02-21 RX ADMIN — HYDROMORPHONE HYDROCHLORIDE 1 MG: 1 INJECTION, SOLUTION INTRAMUSCULAR; INTRAVENOUS; SUBCUTANEOUS at 00:44

## 2019-02-21 RX ADMIN — VANCOMYCIN HYDROCHLORIDE 1250 MG: 10 INJECTION, POWDER, LYOPHILIZED, FOR SOLUTION INTRAVENOUS at 05:35

## 2019-02-21 RX ADMIN — FOLIC ACID 1 MG: 1 TABLET ORAL at 07:55

## 2019-02-21 RX ADMIN — DIPHENHYDRAMINE HYDROCHLORIDE 12.5 MG: 50 INJECTION, SOLUTION INTRAMUSCULAR; INTRAVENOUS at 06:53

## 2019-02-21 NOTE — PROGRESS NOTES
General Surgery End of Shift Nursing Note Bedside shift change report given to Mj Castellanos (oncoming nurse) by Ronald Fischer (offgoing nurse). Report included the following information SBAR, Kardex and Intake/Output. Shift worked:   7a-7p Summary of shift:    Nothing out of the ordinary happened during the day shift today Issues for physician to address:   See above Number times ambulated in hallway past shift: 0 Number of times OOB to chair past shift: 0 Pain Management: 
Current medication: see mar Patient states pain is manageable on current pain medication: YES 
 
GI: 
 
Current diet:  DIET REGULAR Tolerating current diet: YES Passing flatus: YES Last Bowel Movement: several days ago Appearance: brown formed Respiratory: 
 
Incentive Spirometer at bedside: YES Patient instructed on use: YES Patient Safety: 
 
Falls Score: 3 Bed Alarm On? No 
Sitter? No 
 
Gerldine Harms

## 2019-02-21 NOTE — PROGRESS NOTES
General Daily Progress Note Admit Date: 2/10/2019 Subjective:  
 
Patient has no complaint . Current Facility-Administered Medications Medication Dose Route Frequency  [START ON 2/22/2019] Vancomycin trough draw reminder note  1 Each Other ONCE  
 vancomycin (VANCOCIN) 1250 mg in  ml infusion  1,250 mg IntraVENous Q8H  
 cefTRIAXone (ROCEPHIN) 2 g in 0.9% sodium chloride (MBP/ADV) 50 mL  2 g IntraVENous Q24H  
 0.9% sodium chloride infusion 250 mL  250 mL IntraVENous PRN  
 acetaminophen (TYLENOL) tablet 650 mg  650 mg Oral Q4H PRN  
 HYDROmorphone (PF) (DILAUDID) injection 1 mg  1 mg IntraVENous Q3H PRN  
 sodium chloride (NS) flush 10-30 mL  10-30 mL InterCATHeter PRN  
 sodium chloride (NS) flush 10-40 mL  10-40 mL InterCATHeter Q8H  
 losartan (COZAAR) tablet 50 mg  50 mg Oral DAILY  dextrose 5% - 0.45% NaCl with KCl 20 mEq/L infusion  100 mL/hr IntraVENous CONTINUOUS  
 citalopram (CELEXA) tablet 10 mg  10 mg Oral DAILY  diphenhydrAMINE (BENADRYL) capsule 50 mg  50 mg Oral DAILY PRN  
 fentaNYL (DURAGESIC) 75 mcg/hr patch 1 Patch  1 Patch TransDERmal Q72H  folic acid (FOLVITE) tablet 1 mg  1 mg Oral DAILY  sodium chloride (NS) flush 5-40 mL  5-40 mL IntraVENous Q8H  
 sodium chloride (NS) flush 5-40 mL  5-40 mL IntraVENous PRN  
 naloxone (NARCAN) injection 0.4 mg  0.4 mg IntraVENous PRN  
 ondansetron (ZOFRAN) injection 2 mg  2 mg IntraVENous Q6H PRN  
 bisacodyl (DULCOLAX) tablet 5 mg  5 mg Oral DAILY PRN  
 nicotine (NICODERM CQ) 7 mg/24 hr patch 1 Patch  1 Patch TransDERmal DAILY PRN  
 heparin (porcine) injection 5,000 Units  5,000 Units SubCUTAneous Q8H  
 diphenhydrAMINE (BENADRYL) injection 12.5 mg  12.5 mg IntraVENous Q6H PRN  polyethylene glycol (MIRALAX) packet 17 g  17 g Oral DAILY  pantoprazole (PROTONIX) tablet 40 mg  40 mg Oral ACB Review of Systems A comprehensive review of systems was negative. Objective: Patient Vitals for the past 24 hrs: 
 BP Temp Pulse Resp SpO2  
02/21/19 1541 101/63 99.1 °F (37.3 °C) 85 18 97 % 02/21/19 1202 98/51 99.2 °F (37.3 °C) 88 18 96 % 02/21/19 0808 124/72 100.1 °F (37.8 °C) 91 18 99 % 02/21/19 0300 116/69 99.6 °F (37.6 °C) 94 18 99 % 02/21/19 0133 109/78 99.8 °F (37.7 °C) 96 18 100 % 02/20/19 2045 118/62 (!) 101.5 °F (38.6 °C) 98 17 100 % 02/21 0701 - 02/21 1900 In: 200 [P.O.:820; I.V.:1080] Out: -  
02/19 1901 - 02/21 0700 In: 7623 [P.O.:1140; I.V.:2300] Out: 1200 [Urine:1200] Physical Exam:  
Visit Vitals /63 Pulse 85 Temp 99.1 °F (37.3 °C) Resp 18 Ht 5' 10\" (1.778 m) Wt 177 lb 11.1 oz (80.6 kg) SpO2 97% Breastfeeding? No  
BMI 25.50 kg/m² General appearance: alert, cooperative, no distress, appears stated age Neck: supple, symmetrical, trachea midline, no adenopathy, thyroid: not enlarged, symmetric, no tenderness/mass/nodules, no carotid bruit and no JVD Lungs: clear to auscultation bilaterally Heart: regular rate and rhythm, S1, S2 normal, no murmur, click, rub or gallop Abdomen: soft, non-tender. Bowel sounds normal. No masses,  no organomegaly Extremities: extremities normal, atraumatic, no cyanosis or edema Data Review Recent Results (from the past 24 hour(s)) METABOLIC PANEL, BASIC Collection Time: 02/21/19  3:51 AM  
Result Value Ref Range Sodium 135 (L) 136 - 145 mmol/L Potassium 4.0 3.5 - 5.1 mmol/L Chloride 103 97 - 108 mmol/L  
 CO2 29 21 - 32 mmol/L Anion gap 3 (L) 5 - 15 mmol/L Glucose 110 (H) 65 - 100 mg/dL BUN 7 6 - 20 MG/DL Creatinine 0.74 0.55 - 1.02 MG/DL  
 BUN/Creatinine ratio 9 (L) 12 - 20 GFR est AA >60 >60 ml/min/1.73m2 GFR est non-AA >60 >60 ml/min/1.73m2 Calcium 7.7 (L) 8.5 - 10.1 MG/DL Assessment:  
 
Principal Problem: 
  Sickle cell disease ([facility] (2/10/2019) Active Problems: 
  Sickle cell anemia ([facility] (10/3/2014) Hypertension (10/3/2014) Hepatitis C (10/3/2014) Depression (10/5/2014) Sick[facility] crisis ([facility] (9/8/2018) Plan: 1. Patient experiencing more pain therefore will resume hydrocodone. Hemoglobin reasonably stable.

## 2019-02-22 LAB
ANION GAP SERPL CALC-SCNC: 6 MMOL/L (ref 5–15)
BACTERIA SPEC CULT: NORMAL
BASOPHILS # BLD: 0.2 K/UL (ref 0–0.1)
BASOPHILS NFR BLD: 2 % (ref 0–1)
BLASTS NFR BLD MANUAL: 0 %
BUN SERPL-MCNC: 7 MG/DL (ref 6–20)
BUN/CREAT SERPL: 10 (ref 12–20)
CALCIUM SERPL-MCNC: 8.1 MG/DL (ref 8.5–10.1)
CHLORIDE SERPL-SCNC: 107 MMOL/L (ref 97–108)
CO2 SERPL-SCNC: 27 MMOL/L (ref 21–32)
CREAT SERPL-MCNC: 0.67 MG/DL (ref 0.55–1.02)
DATE LAST DOSE: ABNORMAL
DIFFERENTIAL METHOD BLD: ABNORMAL
EOSINOPHIL # BLD: 0.6 K/UL (ref 0–0.4)
EOSINOPHIL NFR BLD: 6 % (ref 0–7)
ERYTHROCYTE [DISTWIDTH] IN BLOOD BY AUTOMATED COUNT: 17.3 % (ref 11.5–14.5)
GLUCOSE SERPL-MCNC: 114 MG/DL (ref 65–100)
HBV SURFACE AG SER QL: <0.1 INDEX
HBV SURFACE AG SER QL: NEGATIVE
HCT VFR BLD AUTO: 17.4 % (ref 35–47)
HCV AB SER IA-ACNC: >11 INDEX
HCV AB SERPL QL IA: REACTIVE
HCV COMMENT,HCGAC: ABNORMAL
HGB BLD-MCNC: 5.8 G/DL (ref 11.5–16)
HIV1 P24 AG SERPL QL IA: NONREACTIVE
HIV1+2 AB SERPL QL IA: NONREACTIVE
IMM GRANULOCYTES # BLD AUTO: 0 K/UL
IMM GRANULOCYTES NFR BLD AUTO: 0 %
LYMPHOCYTES # BLD: 4.7 K/UL (ref 0.8–3.5)
LYMPHOCYTES NFR BLD: 51 % (ref 12–49)
MCH RBC QN AUTO: 31 PG (ref 26–34)
MCHC RBC AUTO-ENTMCNC: 33.3 G/DL (ref 30–36.5)
MCV RBC AUTO: 93 FL (ref 80–99)
METAMYELOCYTES NFR BLD MANUAL: 0 %
MONOCYTES # BLD: 1.5 K/UL (ref 0–1)
MONOCYTES NFR BLD: 16 % (ref 5–13)
MYELOCYTES NFR BLD MANUAL: 0 %
NEUTS BAND NFR BLD MANUAL: 0 % (ref 0–6)
NEUTS SEG # BLD: 2.4 K/UL (ref 1.8–8)
NEUTS SEG NFR BLD: 25 % (ref 32–75)
NRBC # BLD: 0.04 K/UL (ref 0–0.01)
NRBC BLD-RTO: 0.4 PER 100 WBC
OTHER CELLS NFR BLD MANUAL: 0 %
PLATELET # BLD AUTO: 174 K/UL (ref 150–400)
PMV BLD AUTO: 11.5 FL (ref 8.9–12.9)
POTASSIUM SERPL-SCNC: 4.1 MMOL/L (ref 3.5–5.1)
PROMYELOCYTES NFR BLD MANUAL: 0 %
RBC # BLD AUTO: 1.87 M/UL (ref 3.8–5.2)
RBC MORPH BLD: ABNORMAL
REPORTED DOSE,DOSE: ABNORMAL UNITS
REPORTED DOSE/TIME,TMG: 2200
SERVICE CMNT-IMP: NORMAL
SODIUM SERPL-SCNC: 140 MMOL/L (ref 136–145)
VANCOMYCIN TROUGH SERPL-MCNC: 20.1 UG/ML (ref 5–10)
WBC # BLD AUTO: 9.4 K/UL (ref 3.6–11)

## 2019-02-22 PROCEDURE — 74011250636 HC RX REV CODE- 250/636: Performed by: INTERNAL MEDICINE

## 2019-02-22 PROCEDURE — 86900 BLOOD TYPING SEROLOGIC ABO: CPT

## 2019-02-22 PROCEDURE — 80202 ASSAY OF VANCOMYCIN: CPT

## 2019-02-22 PROCEDURE — 86923 COMPATIBILITY TEST ELECTRIC: CPT

## 2019-02-22 PROCEDURE — 36415 COLL VENOUS BLD VENIPUNCTURE: CPT

## 2019-02-22 PROCEDURE — 85027 COMPLETE CBC AUTOMATED: CPT

## 2019-02-22 PROCEDURE — 85660 RBC SICKLE CELL TEST: CPT

## 2019-02-22 PROCEDURE — 65270000029 HC RM PRIVATE

## 2019-02-22 PROCEDURE — 80048 BASIC METABOLIC PNL TOTAL CA: CPT

## 2019-02-22 PROCEDURE — 74011250637 HC RX REV CODE- 250/637: Performed by: INTERNAL MEDICINE

## 2019-02-22 PROCEDURE — 74011000258 HC RX REV CODE- 258: Performed by: INTERNAL MEDICINE

## 2019-02-22 PROCEDURE — 94762 N-INVAS EAR/PLS OXIMTRY CONT: CPT

## 2019-02-22 PROCEDURE — 94761 N-INVAS EAR/PLS OXIMETRY MLT: CPT

## 2019-02-22 PROCEDURE — P9016 RBC LEUKOCYTES REDUCED: HCPCS

## 2019-02-22 RX ORDER — SODIUM CHLORIDE 9 MG/ML
250 INJECTION, SOLUTION INTRAVENOUS AS NEEDED
Status: DISCONTINUED | OUTPATIENT
Start: 2019-02-22 | End: 2019-02-26

## 2019-02-22 RX ADMIN — DIPHENHYDRAMINE HYDROCHLORIDE 12.5 MG: 50 INJECTION, SOLUTION INTRAMUSCULAR; INTRAVENOUS at 14:08

## 2019-02-22 RX ADMIN — ONDANSETRON 2 MG: 2 INJECTION INTRAMUSCULAR; INTRAVENOUS at 14:08

## 2019-02-22 RX ADMIN — ONDANSETRON 2 MG: 2 INJECTION INTRAMUSCULAR; INTRAVENOUS at 20:28

## 2019-02-22 RX ADMIN — CEFTRIAXONE SODIUM 2 G: 2 INJECTION, POWDER, FOR SOLUTION INTRAMUSCULAR; INTRAVENOUS at 10:41

## 2019-02-22 RX ADMIN — HYDROMORPHONE HYDROCHLORIDE 1 MG: 1 INJECTION, SOLUTION INTRAMUSCULAR; INTRAVENOUS; SUBCUTANEOUS at 20:28

## 2019-02-22 RX ADMIN — Medication 10 ML: at 14:04

## 2019-02-22 RX ADMIN — DEXTROSE MONOHYDRATE, SODIUM CHLORIDE, AND POTASSIUM CHLORIDE 100 ML/HR: 50; 4.5; 1.49 INJECTION, SOLUTION INTRAVENOUS at 17:13

## 2019-02-22 RX ADMIN — Medication 10 ML: at 21:16

## 2019-02-22 RX ADMIN — HYDROMORPHONE HYDROCHLORIDE 1 MG: 1 INJECTION, SOLUTION INTRAMUSCULAR; INTRAVENOUS; SUBCUTANEOUS at 08:09

## 2019-02-22 RX ADMIN — PANTOPRAZOLE SODIUM 40 MG: 40 TABLET, DELAYED RELEASE ORAL at 07:22

## 2019-02-22 RX ADMIN — HEPARIN SODIUM 5000 UNITS: 5000 INJECTION INTRAVENOUS; SUBCUTANEOUS at 21:53

## 2019-02-22 RX ADMIN — CITALOPRAM HYDROBROMIDE 10 MG: 20 TABLET ORAL at 08:09

## 2019-02-22 RX ADMIN — VANCOMYCIN HYDROCHLORIDE 1000 MG: 1 INJECTION, POWDER, LYOPHILIZED, FOR SOLUTION INTRAVENOUS at 20:28

## 2019-02-22 RX ADMIN — HYDROMORPHONE HYDROCHLORIDE 1 MG: 1 INJECTION, SOLUTION INTRAMUSCULAR; INTRAVENOUS; SUBCUTANEOUS at 17:11

## 2019-02-22 RX ADMIN — HYDROMORPHONE HYDROCHLORIDE 1 MG: 1 INJECTION, SOLUTION INTRAMUSCULAR; INTRAVENOUS; SUBCUTANEOUS at 00:31

## 2019-02-22 RX ADMIN — Medication 10 ML: at 07:22

## 2019-02-22 RX ADMIN — VANCOMYCIN HYDROCHLORIDE 1000 MG: 1 INJECTION, POWDER, LYOPHILIZED, FOR SOLUTION INTRAVENOUS at 10:44

## 2019-02-22 RX ADMIN — DIPHENHYDRAMINE HYDROCHLORIDE 12.5 MG: 50 INJECTION, SOLUTION INTRAMUSCULAR; INTRAVENOUS at 08:25

## 2019-02-22 RX ADMIN — HEPARIN SODIUM 5000 UNITS: 5000 INJECTION INTRAVENOUS; SUBCUTANEOUS at 14:08

## 2019-02-22 RX ADMIN — HYDROMORPHONE HYDROCHLORIDE 1 MG: 1 INJECTION, SOLUTION INTRAMUSCULAR; INTRAVENOUS; SUBCUTANEOUS at 11:10

## 2019-02-22 RX ADMIN — HEPARIN SODIUM 5000 UNITS: 5000 INJECTION INTRAVENOUS; SUBCUTANEOUS at 07:21

## 2019-02-22 RX ADMIN — ONDANSETRON 2 MG: 2 INJECTION INTRAMUSCULAR; INTRAVENOUS at 08:25

## 2019-02-22 RX ADMIN — ACETAMINOPHEN 650 MG: 325 TABLET ORAL at 08:44

## 2019-02-22 RX ADMIN — HYDROMORPHONE HYDROCHLORIDE 1 MG: 1 INJECTION, SOLUTION INTRAMUSCULAR; INTRAVENOUS; SUBCUTANEOUS at 14:08

## 2019-02-22 RX ADMIN — DIPHENHYDRAMINE HYDROCHLORIDE 12.5 MG: 50 INJECTION, SOLUTION INTRAMUSCULAR; INTRAVENOUS at 00:31

## 2019-02-22 RX ADMIN — HYDROMORPHONE HYDROCHLORIDE 1 MG: 1 INJECTION, SOLUTION INTRAMUSCULAR; INTRAVENOUS; SUBCUTANEOUS at 04:43

## 2019-02-22 RX ADMIN — DIPHENHYDRAMINE HYDROCHLORIDE 12.5 MG: 50 INJECTION, SOLUTION INTRAMUSCULAR; INTRAVENOUS at 20:28

## 2019-02-22 RX ADMIN — ONDANSETRON 2 MG: 2 INJECTION INTRAMUSCULAR; INTRAVENOUS at 00:31

## 2019-02-22 RX ADMIN — LOSARTAN POTASSIUM 50 MG: 50 TABLET, FILM COATED ORAL at 08:09

## 2019-02-22 RX ADMIN — FOLIC ACID 1 MG: 1 TABLET ORAL at 08:10

## 2019-02-22 RX ADMIN — POLYETHYLENE GLYCOL 3350 17 G: 17 POWDER, FOR SOLUTION ORAL at 08:09

## 2019-02-22 RX ADMIN — Medication 1 CAPSULE: at 21:53

## 2019-02-22 NOTE — PROGRESS NOTES
5:09PM 
Pt continuing to be treated for sickle cell crisis. Continuing pain management and observing hemoglobin and temperature. CM will continue to follow and assist with d/c planning. TEMITOPE Moya Care Manager

## 2019-02-22 NOTE — PROGRESS NOTES
Nutrition Assessment: 
 
RECOMMENDATIONS:  
Continue Regular diet ASSESSMENT:  
Chart reviewed. Pt's appetite remains excellent, she is consuming 100% of her meal trays. Labs reviewed, WNL. BM noted yesterday. No skin breakdown. Current intake is likely adequate to meet est needs. Dietitians Intervention(s)/Plan(s): Continue diet SUBJECTIVE/OBJECTIVE:  
 
Diet Order: Regular 
% Eaten:   
Patient Vitals for the past 72 hrs: 
 % Diet Eaten 02/21/19 1212 100 % 02/21/19 1204 100 % 02/21/19 0930 100 % 02/20/19 0919 100 % 02/19/19 1600 100 % Pertinent Medications:rocephin, folvite, protonix, miralax, vancomcyin, KCl; IVF(D5, [email or web address]). Chemistries: 
Lab Results Component Value Date/Time Sodium 140 02/22/2019 04:48 AM  
 Potassium 4.1 02/22/2019 04:48 AM  
 Chloride 107 02/22/2019 04:48 AM  
 CO2 27 02/22/2019 04:48 AM  
 Anion gap 6 02/22/2019 04:48 AM  
 Glucose 114 (H) 02/22/2019 04:48 AM  
 BUN 7 02/22/2019 04:48 AM  
 Creatinine 0.67 02/22/2019 04:48 AM  
 BUN/Creatinine ratio 10 (L) 02/22/2019 04:48 AM  
 GFR est AA >60 02/22/2019 04:48 AM  
 GFR est non-AA >60 02/22/2019 04:48 AM  
 Calcium 8.1 (L) 02/22/2019 04:48 AM  
 Albumin 4.1 02/10/2019 09:51 AM  
  
Anthropometrics: Height: 5' 10\" (177.8 cm) Weight: 80.6 kg (177 lb 11.1 oz)   []bed scale    []stated   []unknown IBW (%IBW):   ( ) UBW (%UBW):   (  %) BMI: Body mass index is 25.5 kg/m². This BMI is indicative of: 
[]Underweight   [x]Normal   []Overweight   [] Obesity   [] Extreme Obesity (BMI>40) Estimated Nutrition Needs (Based on): 1921 Kcals/day(BMR (31-70-28-28) x 1. 3AF) , 81 g(1.0 g/kg bw) Protein Carbohydrate: At Least 130 g/day  Fluids: 2000 mL/day Last BM: 2/21   []Active     []Hyperactive  []Hypoactive       [] Absent   BS Skin:    [x] Intact   [] Incision  [] Breakdown   [] DTI   [] Tears/Excoriation/Abrasion  []Edema [] Other: Wt Readings from Last 30 Encounters: 02/16/19 80.6 kg (177 lb 11.1 oz) 02/07/19 82.2 kg (181 lb 4.8 oz) 01/19/19 79.8 kg (175 lb 14.8 oz) 01/10/19 82.9 kg (182 lb 12.8 oz)  
12/12/18 77 kg (169 lb 12.1 oz)  
11/13/18 89.4 kg (197 lb 1.6 oz) 10/29/18 77.4 kg (170 lb 10.2 oz)  
10/02/18 91.5 kg (201 lb 12.8 oz) 09/24/18 86.3 kg (190 lb 3.2 oz) 09/07/18 83.9 kg (185 lb)  
07/22/18 85.7 kg (188 lb 15 oz) 07/09/18 86.6 kg (191 lb)  
06/12/18 88.7 kg (195 lb 8 oz) 04/09/18 86 kg (189 lb 8 oz) 03/13/18 84.9 kg (187 lb 3.2 oz) 01/29/18 87.2 kg (192 lb 4.8 oz) 01/05/18 86.4 kg (190 lb 7.6 oz) 01/04/18 86 kg (189 lb 9.5 oz)  
11/28/17 87.7 kg (193 lb 4.8 oz)  
11/14/17 89.9 kg (198 lb 4.8 oz) 10/31/17 87.7 kg (193 lb 6.4 oz) 07/31/17 88 kg (194 lb)  
06/13/17 87.6 kg (193 lb 1.6 oz) 05/22/17 86.1 kg (189 lb 13.1 oz) 04/27/17 90.6 kg (199 lb 11.2 oz) 04/06/17 91.9 kg (202 lb 9.6 oz) 03/10/17 89.9 kg (198 lb 3.2 oz) 12/09/16 92.1 kg (203 lb 1.6 oz)  
11/15/16 94.6 kg (208 lb 9.6 oz) 10/21/16 89.4 kg (197 lb) NUTRITION DIAGNOSES:  
Problem:  No nutritional diagnosis at this time NUTRITION INTERVENTIONS: 
Meals/Snacks: General/healthful diet GOAL:  
Pt will consume >70% of meals in 5-7 days. NUTRITION MONITORING AND EVALUATION Previous Goal: PO intake at least 50% of meals next 5-7 days Previous Goal Met: Yes Previous Recommendations Implemented: Yes Cultural, Sikh, or Ethnic Dietary Needs: None LEARNING NEEDS (Diet, Food/Nutrient-Drug Interaction):  
 [x] None Identified 
 [] Identified and Education Provided/Documented 
 [] Identified and Pt declined/was not appropriate [x] Interdisciplinary Care Plan Reviewed/Documented  
 [x] Participated in Discharge Planning: Regular diet  
 [] Interdisciplinary Rounds NUTRITION RISK:  
 [] High              [] Moderate           []  Low  [x]  Minimal/Uncompromised Dwain Manriquez RD, [facility] Pager [phone] Weekend Pager [phone]

## 2019-02-22 NOTE — PROGRESS NOTES
Pharmacy Automatic Renal Dosing Protocol - Antimicrobials Indication for Antimicrobials: Empiric for fever, possible line infection Current Regimen of Each Antimicrobial: 
Vancomycin 1000 mg IV load followed by 1.25 g IV every 8 hours (Start Date ; Day #6) Ceftriaxone 2 g IV every 24 hours (Start Date ; Day #6) Previous Antimicrobial Therapy: 
None Goal Level: VANCOMYCIN TROUGH GOAL RANGE Vancomycin Trough: 15 - 20 mcg/mL Date Dose & Interval Measured (mcg/mL) Extrapolated (mcg/mL)  
[email or web address] 900 mg IV q8h 11.1 9.6  
[email or web address] 1250 mg IV q8h 20.1 18.5 Date & time of next level:  
TBD Significant Cultures:  
19 PBC- NGX5d - FINAL Radiology / Imaging results: (X-ray, CT scan or MRI):  
Negative Paralysis, amputations, malnutrition: None documented Labs: 
Recent Labs  
  19 
0448 19 
0351 19 
0530 CREA 0.67 0.74  --   
BUN 7 7  --   
WBC 9.4  --  11.6* Temp (24hrs), Av °F (37.2 °C), Min:98.4 °F (36.9 °C), Max:99.3 °F (37.4 °C) Creatinine Clearance (mL/min) or Dialysis: 
Estimated Creatinine Clearance: 101.4 mL/min (based on SCr of 0.67 mg/dL). Estimated Creatinine Clearance (using IBW):101.4 mL/min Impression/Plan: · Febrile, Cx negative thus far thus like would like anticipate dc Vancomycin · Continue current regimens · Vancomycin trough level before  6am dose= 20.1 (18.5), safe and therapeutic, however will decrease dose slightly to maintain trough ~15 considering WBC wnl and BCx resulted NEG 
· CBC amd BMP already ordered [facility] · Antimicrobial stop date pending Pharmacy will follow daily and adjust medications as appropriate for renal function and/or serum levels.

## 2019-02-22 NOTE — PROGRESS NOTES
Received call from lab for critical lab results of hgb 5.8, Hct of 17.4 and vancomycin of 20.1. Tele-hospitalist notified.

## 2019-02-23 LAB
ANION GAP SERPL CALC-SCNC: 3 MMOL/L (ref 5–15)
BASOPHILS # BLD: 0.2 K/UL (ref 0–0.1)
BASOPHILS NFR BLD: 2 % (ref 0–1)
BLASTS NFR BLD MANUAL: 0 %
BUN SERPL-MCNC: 7 MG/DL (ref 6–20)
BUN/CREAT SERPL: 9 (ref 12–20)
CALCIUM SERPL-MCNC: 8.1 MG/DL (ref 8.5–10.1)
CHLORIDE SERPL-SCNC: 105 MMOL/L (ref 97–108)
CO2 SERPL-SCNC: 29 MMOL/L (ref 21–32)
CREAT SERPL-MCNC: 0.77 MG/DL (ref 0.55–1.02)
DIFFERENTIAL METHOD BLD: ABNORMAL
EOSINOPHIL # BLD: 0.5 K/UL (ref 0–0.4)
EOSINOPHIL NFR BLD: 5 % (ref 0–7)
ERYTHROCYTE [DISTWIDTH] IN BLOOD BY AUTOMATED COUNT: 18.6 % (ref 11.5–14.5)
GLUCOSE SERPL-MCNC: 112 MG/DL (ref 65–100)
HCT VFR BLD AUTO: 24.9 % (ref 35–47)
HGB BLD-MCNC: 8.5 G/DL (ref 11.5–16)
IMM GRANULOCYTES # BLD AUTO: 0 K/UL
IMM GRANULOCYTES NFR BLD AUTO: 0 %
LYMPHOCYTES # BLD: 3.3 K/UL (ref 0.8–3.5)
LYMPHOCYTES NFR BLD: 36 % (ref 12–49)
MCH RBC QN AUTO: 30 PG (ref 26–34)
MCHC RBC AUTO-ENTMCNC: 34.1 G/DL (ref 30–36.5)
MCV RBC AUTO: 88 FL (ref 80–99)
METAMYELOCYTES NFR BLD MANUAL: 0 %
MONOCYTES # BLD: 1.3 K/UL (ref 0–1)
MONOCYTES NFR BLD: 14 % (ref 5–13)
MYELOCYTES NFR BLD MANUAL: 0 %
NEUTS BAND NFR BLD MANUAL: 0 % (ref 0–6)
NEUTS SEG # BLD: 4 K/UL (ref 1.8–8)
NEUTS SEG NFR BLD: 43 % (ref 32–75)
NRBC # BLD: 0.11 K/UL (ref 0–0.01)
NRBC BLD-RTO: 1.2 PER 100 WBC
OTHER CELLS NFR BLD MANUAL: 0 %
PLATELET # BLD AUTO: 247 K/UL (ref 150–400)
PMV BLD AUTO: 11.9 FL (ref 8.9–12.9)
POTASSIUM SERPL-SCNC: 4.1 MMOL/L (ref 3.5–5.1)
PROMYELOCYTES NFR BLD MANUAL: 0 %
RBC # BLD AUTO: 2.83 M/UL (ref 3.8–5.2)
RBC MORPH BLD: ABNORMAL
RBC MORPH BLD: ABNORMAL
SODIUM SERPL-SCNC: 137 MMOL/L (ref 136–145)
WBC # BLD AUTO: 9.3 K/UL (ref 3.6–11)

## 2019-02-23 PROCEDURE — 94760 N-INVAS EAR/PLS OXIMETRY 1: CPT

## 2019-02-23 PROCEDURE — 74011250637 HC RX REV CODE- 250/637: Performed by: INTERNAL MEDICINE

## 2019-02-23 PROCEDURE — 80048 BASIC METABOLIC PNL TOTAL CA: CPT

## 2019-02-23 PROCEDURE — 36430 TRANSFUSION BLD/BLD COMPNT: CPT

## 2019-02-23 PROCEDURE — 74011250636 HC RX REV CODE- 250/636: Performed by: INTERNAL MEDICINE

## 2019-02-23 PROCEDURE — 74011000258 HC RX REV CODE- 258: Performed by: INTERNAL MEDICINE

## 2019-02-23 PROCEDURE — 65270000029 HC RM PRIVATE

## 2019-02-23 PROCEDURE — 86902 BLOOD TYPE ANTIGEN DONOR EA: CPT

## 2019-02-23 PROCEDURE — P9016 RBC LEUKOCYTES REDUCED: HCPCS

## 2019-02-23 PROCEDURE — 85027 COMPLETE CBC AUTOMATED: CPT

## 2019-02-23 PROCEDURE — 36415 COLL VENOUS BLD VENIPUNCTURE: CPT

## 2019-02-23 RX ADMIN — HYDROMORPHONE HYDROCHLORIDE 1 MG: 1 INJECTION, SOLUTION INTRAMUSCULAR; INTRAVENOUS; SUBCUTANEOUS at 05:08

## 2019-02-23 RX ADMIN — CITALOPRAM HYDROBROMIDE 10 MG: 20 TABLET ORAL at 08:10

## 2019-02-23 RX ADMIN — HYDROMORPHONE HYDROCHLORIDE 1 MG: 1 INJECTION, SOLUTION INTRAMUSCULAR; INTRAVENOUS; SUBCUTANEOUS at 20:11

## 2019-02-23 RX ADMIN — DEXTROSE MONOHYDRATE, SODIUM CHLORIDE, AND POTASSIUM CHLORIDE 100 ML/HR: 50; 4.5; 1.49 INJECTION, SOLUTION INTRAVENOUS at 23:02

## 2019-02-23 RX ADMIN — Medication 10 ML: at 23:01

## 2019-02-23 RX ADMIN — Medication 10 ML: at 14:04

## 2019-02-23 RX ADMIN — ONDANSETRON 2 MG: 2 INJECTION INTRAMUSCULAR; INTRAVENOUS at 05:08

## 2019-02-23 RX ADMIN — DEXTROSE MONOHYDRATE, SODIUM CHLORIDE, AND POTASSIUM CHLORIDE 100 ML/HR: 50; 4.5; 1.49 INJECTION, SOLUTION INTRAVENOUS at 04:36

## 2019-02-23 RX ADMIN — HYDROCODONE BITARTRATE AND ACETAMINOPHEN 1 TABLET: 7.5; 325 TABLET ORAL at 12:42

## 2019-02-23 RX ADMIN — DIPHENHYDRAMINE HYDROCHLORIDE 12.5 MG: 50 INJECTION, SOLUTION INTRAMUSCULAR; INTRAVENOUS at 11:08

## 2019-02-23 RX ADMIN — Medication 10 ML: at 05:05

## 2019-02-23 RX ADMIN — CEFTRIAXONE SODIUM 2 G: 2 INJECTION, POWDER, FOR SOLUTION INTRAMUSCULAR; INTRAVENOUS at 11:08

## 2019-02-23 RX ADMIN — DIPHENHYDRAMINE HYDROCHLORIDE 50 MG: 25 CAPSULE ORAL at 05:08

## 2019-02-23 RX ADMIN — HYDROMORPHONE HYDROCHLORIDE 1 MG: 1 INJECTION, SOLUTION INTRAMUSCULAR; INTRAVENOUS; SUBCUTANEOUS at 11:08

## 2019-02-23 RX ADMIN — PANTOPRAZOLE SODIUM 40 MG: 40 TABLET, DELAYED RELEASE ORAL at 08:10

## 2019-02-23 RX ADMIN — ONDANSETRON 2 MG: 2 INJECTION INTRAMUSCULAR; INTRAVENOUS at 17:04

## 2019-02-23 RX ADMIN — VANCOMYCIN HYDROCHLORIDE 1000 MG: 1 INJECTION, POWDER, LYOPHILIZED, FOR SOLUTION INTRAVENOUS at 04:36

## 2019-02-23 RX ADMIN — ONDANSETRON 2 MG: 2 INJECTION INTRAMUSCULAR; INTRAVENOUS at 23:00

## 2019-02-23 RX ADMIN — HYDROMORPHONE HYDROCHLORIDE 1 MG: 1 INJECTION, SOLUTION INTRAMUSCULAR; INTRAVENOUS; SUBCUTANEOUS at 14:00

## 2019-02-23 RX ADMIN — LOSARTAN POTASSIUM 50 MG: 50 TABLET, FILM COATED ORAL at 08:10

## 2019-02-23 RX ADMIN — VANCOMYCIN HYDROCHLORIDE 1000 MG: 1 INJECTION, POWDER, LYOPHILIZED, FOR SOLUTION INTRAVENOUS at 20:11

## 2019-02-23 RX ADMIN — ONDANSETRON 2 MG: 2 INJECTION INTRAMUSCULAR; INTRAVENOUS at 11:08

## 2019-02-23 RX ADMIN — FOLIC ACID 1 MG: 1 TABLET ORAL at 08:10

## 2019-02-23 RX ADMIN — HYDROMORPHONE HYDROCHLORIDE 1 MG: 1 INJECTION, SOLUTION INTRAMUSCULAR; INTRAVENOUS; SUBCUTANEOUS at 01:14

## 2019-02-23 RX ADMIN — HEPARIN SODIUM 5000 UNITS: 5000 INJECTION INTRAVENOUS; SUBCUTANEOUS at 05:08

## 2019-02-23 RX ADMIN — HEPARIN SODIUM 5000 UNITS: 5000 INJECTION INTRAVENOUS; SUBCUTANEOUS at 14:00

## 2019-02-23 RX ADMIN — DIPHENHYDRAMINE HYDROCHLORIDE 12.5 MG: 50 INJECTION, SOLUTION INTRAMUSCULAR; INTRAVENOUS at 17:04

## 2019-02-23 RX ADMIN — VANCOMYCIN HYDROCHLORIDE 1000 MG: 1 INJECTION, POWDER, LYOPHILIZED, FOR SOLUTION INTRAVENOUS at 13:59

## 2019-02-23 RX ADMIN — HYDROMORPHONE HYDROCHLORIDE 1 MG: 1 INJECTION, SOLUTION INTRAMUSCULAR; INTRAVENOUS; SUBCUTANEOUS at 17:04

## 2019-02-23 RX ADMIN — DIPHENHYDRAMINE HYDROCHLORIDE 50 MG: 25 CAPSULE ORAL at 23:00

## 2019-02-23 RX ADMIN — HYDROMORPHONE HYDROCHLORIDE 1 MG: 1 INJECTION, SOLUTION INTRAMUSCULAR; INTRAVENOUS; SUBCUTANEOUS at 23:01

## 2019-02-23 RX ADMIN — POLYETHYLENE GLYCOL 3350 17 G: 17 POWDER, FOR SOLUTION ORAL at 08:10

## 2019-02-23 RX ADMIN — ONDANSETRON 2 MG: 2 INJECTION INTRAMUSCULAR; INTRAVENOUS at 01:14

## 2019-02-23 RX ADMIN — HYDROMORPHONE HYDROCHLORIDE 1 MG: 1 INJECTION, SOLUTION INTRAMUSCULAR; INTRAVENOUS; SUBCUTANEOUS at 08:10

## 2019-02-23 RX ADMIN — HEPARIN SODIUM 5000 UNITS: 5000 INJECTION INTRAVENOUS; SUBCUTANEOUS at 22:00

## 2019-02-23 NOTE — PROGRESS NOTES
Problem: Falls - Risk of 
Goal: *Absence of Falls Document Maria L Ayala Fall Risk and appropriate interventions in the flowsheet. Outcome: Progressing Towards Goal 
Fall Risk Interventions: 
  
 
  
 
Medication Interventions: Teach patient to arise slowly

## 2019-02-23 NOTE — PROGRESS NOTES
General Surgery End of Shift Nursing Note Bedside shift change report given to Louisa (oncoming nurse) by Teri Hogue (offgoing nurse). Report included the following information SBAR, Kardex, Intake/Output, MAR, Recent Results and Med Rec Status. Shift worked:   7p-7a Summary of shift:  Patient had good night, continues pain management. Patient had two units of PRBCs over night, no reaction noted. Labs done. Continues to void well. Issues for physician to address:   none Byron Mcclelland

## 2019-02-23 NOTE — PROGRESS NOTES
General Daily Progress Note Admit Date: 2/10/2019 Subjective:  
 
Patient continues to complain of pain but no worse. Current Facility-Administered Medications Medication Dose Route Frequency  vancomycin (VANCOCIN) 1,000 mg in 0.9% sodium chloride (MBP/ADV) 250 mL  1,000 mg IntraVENous Q8H  
 0.9% sodium chloride infusion 250 mL  250 mL IntraVENous PRN  
 HYDROcodone-acetaminophen (NORCO) 7.5-325 mg per tablet 1 Tab  1 Tab Oral Q4H PRN  
 cefTRIAXone (ROCEPHIN) 2 g in 0.9% sodium chloride (MBP/ADV) 50 mL  2 g IntraVENous Q24H  
 0.9% sodium chloride infusion 250 mL  250 mL IntraVENous PRN  
 acetaminophen (TYLENOL) tablet 650 mg  650 mg Oral Q4H PRN  
 HYDROmorphone (PF) (DILAUDID) injection 1 mg  1 mg IntraVENous Q3H PRN  
 sodium chloride (NS) flush 10-30 mL  10-30 mL InterCATHeter PRN  
 sodium chloride (NS) flush 10-40 mL  10-40 mL InterCATHeter Q8H  
 losartan (COZAAR) tablet 50 mg  50 mg Oral DAILY  dextrose 5% - 0.45% NaCl with KCl 20 mEq/L infusion  100 mL/hr IntraVENous CONTINUOUS  
 citalopram (CELEXA) tablet 10 mg  10 mg Oral DAILY  diphenhydrAMINE (BENADRYL) capsule 50 mg  50 mg Oral DAILY PRN  
 fentaNYL (DURAGESIC) 75 mcg/hr patch 1 Patch  1 Patch TransDERmal Q72H  folic acid (FOLVITE) tablet 1 mg  1 mg Oral DAILY  sodium chloride (NS) flush 5-40 mL  5-40 mL IntraVENous Q8H  
 sodium chloride (NS) flush 5-40 mL  5-40 mL IntraVENous PRN  
 naloxone (NARCAN) injection 0.4 mg  0.4 mg IntraVENous PRN  
 ondansetron (ZOFRAN) injection 2 mg  2 mg IntraVENous Q6H PRN  
 bisacodyl (DULCOLAX) tablet 5 mg  5 mg Oral DAILY PRN  
 nicotine (NICODERM CQ) 7 mg/24 hr patch 1 Patch  1 Patch TransDERmal DAILY PRN  
 heparin (porcine) injection 5,000 Units  5,000 Units SubCUTAneous Q8H  
 diphenhydrAMINE (BENADRYL) injection 12.5 mg  12.5 mg IntraVENous Q6H PRN  polyethylene glycol (MIRALAX) packet 17 g  17 g Oral DAILY  pantoprazole (PROTONIX) tablet 40 mg  40 mg Oral ACB Review of Systems A comprehensive review of systems was negative. Objective:  
 
Patient Vitals for the past 24 hrs: 
 BP Temp Pulse Resp SpO2  
02/22/19 1953 106/66 98.6 °F (37 °C) 87 16 98 % 02/22/19 1607 97/45 98.6 °F (37 °C) 85 16 100 % 02/22/19 1228 113/56 98.5 °F (36.9 °C) 86 16 98 % 02/22/19 0706 120/66 99.2 °F (37.3 °C) 76 18 100 % 02/22/19 0353 90/48 98.4 °F (36.9 °C) 80 18 100 % 02/21/19 2327 99/49 99.3 °F (37.4 °C) 65 18 99 % No intake/output data recorded. 02/21 0701 - 02/22 1900 In: 4926.7 [P.O.:1095; I.V.:3831.7] Out: 500 [Urine:500] Physical Exam:  
Visit Vitals /66 Pulse 87 Temp 98.6 °F (37 °C) Resp 16 Ht 5' 10\" (1.778 m) Wt 177 lb 11.1 oz (80.6 kg) SpO2 98% Breastfeeding? No  
BMI 25.50 kg/m² General appearance: alert, cooperative, no distress, appears stated age Neck: supple, symmetrical, trachea midline, no adenopathy, thyroid: not enlarged, symmetric, no tenderness/mass/nodules, no carotid bruit and no JVD Lungs: clear to auscultation bilaterally Heart: regular rate and rhythm, S1, S2 normal, no murmur, click, rub or gallop Abdomen: soft, non-tender. Bowel sounds normal. No masses,  no organomegaly Extremities: extremities normal, atraumatic, no cyanosis or edema Data Review Recent Results (from the past 24 hour(s)) METABOLIC PANEL, BASIC Collection Time: 02/22/19  4:48 AM  
Result Value Ref Range Sodium 140 136 - 145 mmol/L Potassium 4.1 3.5 - 5.1 mmol/L Chloride 107 97 - 108 mmol/L  
 CO2 27 21 - 32 mmol/L Anion gap 6 5 - 15 mmol/L Glucose 114 (H) 65 - 100 mg/dL BUN 7 6 - 20 MG/DL Creatinine 0.67 0.55 - 1.02 MG/DL  
 BUN/Creatinine ratio 10 (L) 12 - 20 GFR est AA >60 >60 ml/min/1.73m2 GFR est non-AA >60 >60 ml/min/1.73m2 Calcium 8.1 (L) 8.5 - 10.1 MG/DL  
CBC WITH MANUAL DIFF  Collection Time: 02/22/19  4:48 AM  
 Result Value Ref Range WBC 9.4 3.6 - 11.0 K/uL  
 RBC 1.87 (L) 3.80 - 5.20 M/uL HGB 5.8 (LL) 11.5 - 16.0 g/dL HCT 17.4 (LL) 35.0 - 47.0 % MCV 93.0 80.0 - 99.0 FL  
 MCH 31.0 26.0 - 34.0 PG  
 MCHC 33.3 30.0 - 36.5 g/dL  
 RDW 17.3 (H) 11.5 - 14.5 % PLATELET 480 997 - 862 K/uL MPV 11.5 8.9 - 12.9 FL  
 NRBC 0.4 (H) 0  WBC ABSOLUTE NRBC 0.04 (H) 0.00 - 0.01 K/uL NEUTROPHILS 25 (L) 32 - 75 % BAND NEUTROPHILS 0 0 - 6 % LYMPHOCYTES 51 (H) 12 - 49 % MONOCYTES 16 (H) 5 - 13 % EOSINOPHILS 6 0 - 7 % BASOPHILS 2 (H) 0 - 1 % METAMYELOCYTES 0 0 % MYELOCYTES 0 0 % PROMYELOCYTES 0 0 % BLASTS 0 0 % OTHER CELL 0 0 IMMATURE GRANULOCYTES 0 %  
 ABS. NEUTROPHILS 2.4 1.8 - 8.0 K/UL  
 ABS. LYMPHOCYTES 4.7 (H) 0.8 - 3.5 K/UL  
 ABS. MONOCYTES 1.5 (H) 0.0 - 1.0 K/UL  
 ABS. EOSINOPHILS 0.6 (H) 0.0 - 0.4 K/UL  
 ABS. BASOPHILS 0.2 (H) 0.0 - 0.1 K/UL  
 ABS. IMM. GRANS. 0.0 K/UL  
 DF MANUAL    
 RBC COMMENTS ANISOCYTOSIS 2+ 
    
 RBC COMMENTS SICKLE CELLS 1+ RBC COMMENTS TARGET CELLS 3+ Kristen Ernandez Collection Time: 02/22/19  4:48 AM  
Result Value Ref Range Vancomycin,trough 20.1 (HH) 5.0 - 10.0 ug/mL Reported dose date: 88246915 Reported dose time: 2200 Reported dose: 1250MG UNITS POST EXPOSURE PROFILE Collection Time: 02/22/19  8:30 AM  
Result Value Ref Range p24 Antigen NONREACTIVE NR    
 HIV-1,2 Ab NONREACTIVE NR Hepatitis C virus Ab >11.00 Index Hep C  virus Ab Interp. REACTIVE (A) NR Hep C  virus Ab comment Method used is Hydra Dx Hepatitis B surface Ag <0.10 Index Hep B surface Ag Interp. NEGATIVE  NEG    
TYPE + CROSSMATCH Collection Time: 02/22/19  5:30 PM  
Result Value Ref Range Crossmatch Expiration 02/25/2019 ABO/Rh(D) A NEGATIVE Antibody screen NEG Assessment:  
 
Principal Problem: 
  Sickle cell disease ([facility] (2/10/2019) Active Problems: Sickle cell anemia ([facility] (10/3/2014) Hypertension (10/3/2014) Hepatitis C (10/3/2014) Depression (10/5/2014) Sickle cell pain crisis ([facility] (9/8/2018) Plan: 1. Continue pain. Hemoglobin dropped to 5.3. Would attempt transfusion. 2.  Continue low-grade temperature often times occurring with sickle crises. She remains culture negative. If this continues antibiotics will be discontinued on Monday.

## 2019-02-23 NOTE — PROGRESS NOTES
Pharmacy Automatic Renal Dosing Protocol - Antimicrobials Indication for Antimicrobials: Empiric for fever, possible line infection Current Regimen of Each Antimicrobial: 
Vancomycin 1000 mg IV load followed by 1.25 g IV every 8 hours (Start Date ; Day #7) Ceftriaxone 2 g IV every 24 hours (Start Date ; Day #7) Previous Antimicrobial Therapy: 
None Goal Level: VANCOMYCIN TROUGH GOAL RANGE Vancomycin Trough: 15 - 20 mcg/mL Date Dose & Interval Measured (mcg/mL) Extrapolated (mcg/mL)  
[email or web address] 900 mg IV q8h 11.1 9.6  
[email or web address] 1250 mg IV q8h 20.1 18.5 Significant Cultures:  
19 PBC- NGX5d - FINAL Radiology / Imaging results: (X-ray, CT scan or MRI):  
Negative Paralysis, amputations, malnutrition: None Labs: 
Recent Labs  
  19 
1235 19 
0448 19 
4594 CREA 0.77 0.67 0.74 BUN 7 7 7 WBC 9.3 9.4  -- Temp (24hrs), Av.9 °F (37.2 °C), Min:98.6 °F (37 °C), Max:99.3 °F (37.4 °C) Creatinine Clearance (mL/min) or Dialysis: 
Estimated Creatinine Clearance: 88.2 mL/min (based on SCr of 0.77 mg/dL). Estimated Creatinine Clearance (using IBW):88.2 mL/min Impression/Plan: · Febrile, Cx negative, WBc < 10k, lungs clear · Continue current dosing regimens · CBC amd BMP already ordered [facility] · Antimicrobial stop date pending Recommendation:  Discontinue Vancomycin and Ceftriaxone Pharmacy will follow daily and adjust medications as appropriate for renal function and/or serum levels.  
 
Thank you, 
Annetta Shah, [facility]

## 2019-02-24 LAB
BASOPHILS # BLD: 0 K/UL (ref 0–0.1)
BASOPHILS NFR BLD: 0 % (ref 0–1)
BLASTS NFR BLD MANUAL: 0 %
DIFFERENTIAL METHOD BLD: ABNORMAL
EOSINOPHIL # BLD: 0.7 K/UL (ref 0–0.4)
EOSINOPHIL NFR BLD: 9 % (ref 0–7)
ERYTHROCYTE [DISTWIDTH] IN BLOOD BY AUTOMATED COUNT: 18.9 % (ref 11.5–14.5)
HCT VFR BLD AUTO: 24.3 % (ref 35–47)
HGB BLD-MCNC: 8.1 G/DL (ref 11.5–16)
IMM GRANULOCYTES # BLD AUTO: 0 K/UL
IMM GRANULOCYTES NFR BLD AUTO: 0 %
LYMPHOCYTES # BLD: 3.9 K/UL (ref 0.8–3.5)
LYMPHOCYTES NFR BLD: 50 % (ref 12–49)
MCH RBC QN AUTO: 30.1 PG (ref 26–34)
MCHC RBC AUTO-ENTMCNC: 33.3 G/DL (ref 30–36.5)
MCV RBC AUTO: 90.3 FL (ref 80–99)
METAMYELOCYTES NFR BLD MANUAL: 0 %
MONOCYTES # BLD: 0.8 K/UL (ref 0–1)
MONOCYTES NFR BLD: 11 % (ref 5–13)
MYELOCYTES NFR BLD MANUAL: 0 %
NEUTS BAND NFR BLD MANUAL: 0 % (ref 0–6)
NEUTS SEG # BLD: 2.3 K/UL (ref 1.8–8)
NEUTS SEG NFR BLD: 30 % (ref 32–75)
NRBC # BLD: 0.1 K/UL (ref 0–0.01)
NRBC BLD-RTO: 1.3 PER 100 WBC
OTHER CELLS NFR BLD MANUAL: 0 %
PLATELET # BLD AUTO: 236 K/UL (ref 150–400)
PMV BLD AUTO: 11.6 FL (ref 8.9–12.9)
PROMYELOCYTES NFR BLD MANUAL: 0 %
RBC # BLD AUTO: 2.69 M/UL (ref 3.8–5.2)
RBC MORPH BLD: ABNORMAL
WBC # BLD AUTO: 7.7 K/UL (ref 3.6–11)

## 2019-02-24 PROCEDURE — 74011250637 HC RX REV CODE- 250/637: Performed by: INTERNAL MEDICINE

## 2019-02-24 PROCEDURE — 85027 COMPLETE CBC AUTOMATED: CPT

## 2019-02-24 PROCEDURE — 74011250636 HC RX REV CODE- 250/636: Performed by: INTERNAL MEDICINE

## 2019-02-24 PROCEDURE — 36415 COLL VENOUS BLD VENIPUNCTURE: CPT

## 2019-02-24 PROCEDURE — 65270000029 HC RM PRIVATE

## 2019-02-24 PROCEDURE — 74011000258 HC RX REV CODE- 258: Performed by: INTERNAL MEDICINE

## 2019-02-24 RX ADMIN — DIPHENHYDRAMINE HYDROCHLORIDE 50 MG: 25 CAPSULE ORAL at 11:38

## 2019-02-24 RX ADMIN — Medication 10 ML: at 21:32

## 2019-02-24 RX ADMIN — HYDROMORPHONE HYDROCHLORIDE 1 MG: 1 INJECTION, SOLUTION INTRAMUSCULAR; INTRAVENOUS; SUBCUTANEOUS at 08:33

## 2019-02-24 RX ADMIN — HYDROMORPHONE HYDROCHLORIDE 1 MG: 1 INJECTION, SOLUTION INTRAMUSCULAR; INTRAVENOUS; SUBCUTANEOUS at 15:28

## 2019-02-24 RX ADMIN — Medication 10 ML: at 05:22

## 2019-02-24 RX ADMIN — VANCOMYCIN HYDROCHLORIDE 1000 MG: 1 INJECTION, POWDER, LYOPHILIZED, FOR SOLUTION INTRAVENOUS at 21:32

## 2019-02-24 RX ADMIN — ONDANSETRON 2 MG: 2 INJECTION INTRAMUSCULAR; INTRAVENOUS at 18:27

## 2019-02-24 RX ADMIN — HEPARIN SODIUM 5000 UNITS: 5000 INJECTION INTRAVENOUS; SUBCUTANEOUS at 05:26

## 2019-02-24 RX ADMIN — DIPHENHYDRAMINE HYDROCHLORIDE 50 MG: 25 CAPSULE ORAL at 18:27

## 2019-02-24 RX ADMIN — ONDANSETRON 2 MG: 2 INJECTION INTRAMUSCULAR; INTRAVENOUS at 05:26

## 2019-02-24 RX ADMIN — HEPARIN SODIUM 5000 UNITS: 5000 INJECTION INTRAVENOUS; SUBCUTANEOUS at 21:31

## 2019-02-24 RX ADMIN — Medication 1 CAPSULE: at 08:35

## 2019-02-24 RX ADMIN — Medication 10 ML: at 15:29

## 2019-02-24 RX ADMIN — CEFTRIAXONE SODIUM 2 G: 2 INJECTION, POWDER, FOR SOLUTION INTRAMUSCULAR; INTRAVENOUS at 11:36

## 2019-02-24 RX ADMIN — DEXTROSE MONOHYDRATE, SODIUM CHLORIDE, AND POTASSIUM CHLORIDE 100 ML/HR: 50; 4.5; 1.49 INJECTION, SOLUTION INTRAVENOUS at 11:42

## 2019-02-24 RX ADMIN — HEPARIN SODIUM 5000 UNITS: 5000 INJECTION INTRAVENOUS; SUBCUTANEOUS at 15:29

## 2019-02-24 RX ADMIN — HYDROMORPHONE HYDROCHLORIDE 1 MG: 1 INJECTION, SOLUTION INTRAMUSCULAR; INTRAVENOUS; SUBCUTANEOUS at 05:26

## 2019-02-24 RX ADMIN — ONDANSETRON 2 MG: 2 INJECTION INTRAMUSCULAR; INTRAVENOUS at 11:37

## 2019-02-24 RX ADMIN — LOSARTAN POTASSIUM 50 MG: 50 TABLET, FILM COATED ORAL at 08:35

## 2019-02-24 RX ADMIN — POLYETHYLENE GLYCOL 3350 17 G: 17 POWDER, FOR SOLUTION ORAL at 08:34

## 2019-02-24 RX ADMIN — VANCOMYCIN HYDROCHLORIDE 1000 MG: 1 INJECTION, POWDER, LYOPHILIZED, FOR SOLUTION INTRAVENOUS at 04:00

## 2019-02-24 RX ADMIN — FOLIC ACID 1 MG: 1 TABLET ORAL at 08:35

## 2019-02-24 RX ADMIN — PANTOPRAZOLE SODIUM 40 MG: 40 TABLET, DELAYED RELEASE ORAL at 08:35

## 2019-02-24 RX ADMIN — HYDROMORPHONE HYDROCHLORIDE 1 MG: 1 INJECTION, SOLUTION INTRAMUSCULAR; INTRAVENOUS; SUBCUTANEOUS at 12:11

## 2019-02-24 RX ADMIN — DIPHENHYDRAMINE HYDROCHLORIDE 50 MG: 25 CAPSULE ORAL at 05:25

## 2019-02-24 RX ADMIN — CITALOPRAM HYDROBROMIDE 10 MG: 20 TABLET ORAL at 08:34

## 2019-02-24 RX ADMIN — HYDROMORPHONE HYDROCHLORIDE 1 MG: 1 INJECTION, SOLUTION INTRAMUSCULAR; INTRAVENOUS; SUBCUTANEOUS at 18:26

## 2019-02-24 RX ADMIN — HYDROMORPHONE HYDROCHLORIDE 1 MG: 1 INJECTION, SOLUTION INTRAMUSCULAR; INTRAVENOUS; SUBCUTANEOUS at 02:19

## 2019-02-24 RX ADMIN — VANCOMYCIN HYDROCHLORIDE 1000 MG: 1 INJECTION, POWDER, LYOPHILIZED, FOR SOLUTION INTRAVENOUS at 12:12

## 2019-02-24 RX ADMIN — HYDROMORPHONE HYDROCHLORIDE 1 MG: 1 INJECTION, SOLUTION INTRAMUSCULAR; INTRAVENOUS; SUBCUTANEOUS at 21:32

## 2019-02-24 NOTE — PROGRESS NOTES
Patient Active Problem List  
Diagnosis Code  Sickle cell anemia (HCC) D57.1  Hypertension I10  Venous insufficiency I87.2  Hepatitis C B19.20  Depression F32.9  Sickle cell crisis ([facility] D57.00  Pulsatile tinnitus H93. A9  
 Carpal tunnel syndrome of right wrist G56.01  
 Sickle cell pain crisis ([facility] D57.00  
 Overweight (BMI 25.0-29. 9) E66.3  Sickle cell disease ([facility] D57.1 Allergies Allergen Reactions  Dilaudid [Hydromorphone (Bulk)] Itching Can tolerate with benadryl and ondansetron  Percocet [Oxycodone-Acetaminophen] Itching Current Facility-Administered Medications:  
  [START ON 2/25/2019] Vancomycin trough level draw reminder note, 1 Each, Other, ONCE, Janet Starr MD 
  vancomycin (VANCOCIN) 1,000 mg in 0.9% sodium chloride (MBP/ADV) 250 mL, 1,000 mg, IntraVENous, Q8H, Janet Starr MD, Last Rate: 125 mL/hr at 02/24/19 1212, 1,000 mg at 02/24/19 1212 
  0.9% sodium chloride infusion 250 mL, 250 mL, IntraVENous, PRN, Janet Starr MD 
  lactobac ac& pc-s.therm-b.anim (CARLITOS Q/RISAQUAD), 1 Cap, Oral, DAILY, Janet Starr MD, 1 Cap at 02/24/19 0835 
  HYDROcodone-acetaminophen (NORCO) 7.5-325 mg per tablet 1 Tab, 1 Tab, Oral, Q4H PRN, Janet Starr MD, 1 Tab at 02/23/19 1242   cefTRIAXone (ROCEPHIN) 2 g in 0.9% sodium chloride (MBP/ADV) 50 mL, 2 g, IntraVENous, Q24H, Janet Starr MD, Last Rate: 100 mL/hr at 02/24/19 1136, 2 g at 02/24/19 1136 
  0.9% sodium chloride infusion 250 mL, 250 mL, IntraVENous, PRN, Janet Starr MD 
  acetaminophen (TYLENOL) tablet 650 mg, 650 mg, Oral, Q4H PRN, Janet Starr MD, 650 mg at 02/22/19 0844 
  HYDROmorphone (PF) (DILAUDID) injection 1 mg, 1 mg, IntraVENous, Q3H PRN, Janet Starr MD, 1 mg at 02/24/19 1217 
  sodium chloride (NS) flush 10-30 mL, 10-30 mL, InterCATHeter, PRN, Janet Starr MD, 10 mL at 02/20/19 9210   sodium chloride (NS) flush 10-40 mL, 10-40 mL, InterCATHeter, Q8H, Mindi Bland MD, 10 mL at 02/24/19 0522   losartan (COZAAR) tablet 50 mg, 50 mg, Oral, DAILY, Mindi Bland MD, 50 mg at 02/24/19 5303   dextrose 5% - 0.45% NaCl with KCl 20 mEq/L infusion, 100 mL/hr, IntraVENous, CONTINUOUS, iMndi Bland MD, Last Rate: 100 mL/hr at 02/24/19 1142, 100 mL/hr at 02/24/19 1142   citalopram (CELEXA) tablet 10 mg, 10 mg, Oral, DAILY, Magan Rodrigues MD, 10 mg at 02/24/19 8907   diphenhydrAMINE (BENADRYL) capsule 50 mg, 50 mg, Oral, DAILY PRN, Magan Rodrigues MD, 50 mg at 02/24/19 1138   fentaNYL (DURAGESIC) 75 mcg/hr patch 1 Patch, 1 Patch, TransDERmal, Q72H, Magan Rodrigues MD, 1 Patch at 02/22/19 1197   folic acid (FOLVITE) tablet 1 mg, 1 mg, Oral, DAILY, Magan Rodrigues MD, 1 mg at 02/24/19 0218   sodium chloride (NS) flush 5-40 mL, 5-40 mL, IntraVENous, Q8H, Magan Rodrigues MD, 10 mL at 02/24/19 0522   sodium chloride (NS) flush 5-40 mL, 5-40 mL, IntraVENous, PRN, Magan Rodrigues MD, 5 mL at 02/17/19 2307   naloxone (NARCAN) injection 0.4 mg, 0.4 mg, IntraVENous, PRN, Magan Rodrigues MD 
  ondansetron [facility]) injection 2 mg, 2 mg, IntraVENous, Q6H PRN, Magan Rodrigues MD, 2 mg at 02/24/19 1137 
  bisacodyl (DULCOLAX) tablet 5 mg, 5 mg, Oral, DAILY PRN, Magan Rodrigues MD, 5 mg at 02/21/19 0801 
  nicotine (NICODERM CQ) 7 mg/24 hr patch 1 Patch, 1 Patch, TransDERmal, DAILY PRN, Magan Rodrigues MD 
  heparin (porcine) injection 5,000 Units, 5,000 Units, SubCUTAneous, Q8H, Magan Rodrigues MD, 5,000 Units at 02/24/19 0071   diphenhydrAMINE (BENADRYL) injection 12.5 mg, 12.5 mg, IntraVENous, Q6H PRN, Magan Rodrigues MD, 12.5 mg at 02/23/19 1704   polyethylene glycol (MIRALAX) packet 17 g, 17 g, Oral, DAILY, Magan Rodrigues MD, 17 g at 02/24/19 8633   pantoprazole (PROTONIX) tablet 40 mg, 40 mg, Oral, ACB, Magan Rodrigues MD, 40 mg at 02/24/19 1635 Recent Results (from the past 24 hour(s)) CBC WITH MANUAL DIFF Collection Time: 02/24/19  2:20 AM  
Result Value Ref Range WBC 7.7 3.6 - 11.0 K/uL  
 RBC 2.69 (L) 3.80 - 5.20 M/uL HGB 8.1 (L) 11.5 - 16.0 g/dL HCT 24.3 (L) 35.0 - 47.0 % MCV 90.3 80.0 - 99.0 FL  
 MCH 30.1 26.0 - 34.0 PG  
 MCHC 33.3 30.0 - 36.5 g/dL  
 RDW 18.9 (H) 11.5 - 14.5 % PLATELET 302 947 - 046 K/uL MPV 11.6 8.9 - 12.9 FL  
 NRBC 1.3 (H) 0  WBC ABSOLUTE NRBC 0.10 (H) 0.00 - 0.01 K/uL NEUTROPHILS 30 (L) 32 - 75 % BAND NEUTROPHILS 0 0 - 6 % LYMPHOCYTES 50 (H) 12 - 49 % MONOCYTES 11 5 - 13 % EOSINOPHILS 9 (H) 0 - 7 % BASOPHILS 0 0 - 1 % METAMYELOCYTES 0 0 % MYELOCYTES 0 0 % PROMYELOCYTES 0 0 % BLASTS 0 0 % OTHER CELL 0 0 IMMATURE GRANULOCYTES 0 %  
 ABS. NEUTROPHILS 2.3 1.8 - 8.0 K/UL  
 ABS. LYMPHOCYTES 3.9 (H) 0.8 - 3.5 K/UL  
 ABS. MONOCYTES 0.8 0.0 - 1.0 K/UL  
 ABS. EOSINOPHILS 0.7 (H) 0.0 - 0.4 K/UL  
 ABS. BASOPHILS 0.0 0.0 - 0.1 K/UL  
 ABS. IMM. GRANS. 0.0 K/UL  
 DF MANUAL    
 RBC COMMENTS ANISOCYTOSIS 2+ 
    
 RBC COMMENTS MACROCYTOSIS 1+ 
    
 RBC COMMENTS MICROCYTOSIS 
PRESENT 
    
 RBC COMMENTS POLYCHROMASIA 1+ 
    
 RBC COMMENTS TARGET CELLS 
PRESENT 
    
 RBC COMMENTS FEW SICKLE CELLS 
PRESENT Visit Vitals /63 (BP 1 Location: Left arm, BP Patient Position: At rest) Pulse 78 Temp 99 °F (37.2 °C) Resp 18 Ht 5' 10\" (1.778 m) Wt 80.6 kg (177 lb 11.1 oz) SpO2 98% Breastfeeding? No  
BMI 25.50 kg/m² Lungs clear Heart RRR Stable anemia s/p 2u red cells

## 2019-02-24 NOTE — PROGRESS NOTES
General Surgery End of Shift Nursing Note Bedside shift change report given to Amy  (oncoming nurse) by Keena Landon (offgoing nurse). Report included the following information SBAR, Kardex, Intake/Output, MAR, Recent Results and Med Rec Status. Shift worked:   7p-7a Summary of shift:    Pain continues to be well managed with prn treatment, continues atb therapy with no complaints. Issues for physician to address:   none Robi Clark

## 2019-02-24 NOTE — PROGRESS NOTES
Problem: Falls - Risk of 
Goal: *Absence of Falls Document Keira Arenas Fall Risk and appropriate interventions in the flowsheet. Outcome: Progressing Towards Goal 
Fall Risk Interventions: 
  
 
  
 
Medication Interventions: Patient to call before getting OOB, Teach patient to arise slowly

## 2019-02-25 LAB
ABO + RH BLD: NORMAL
ANION GAP SERPL CALC-SCNC: 4 MMOL/L (ref 5–15)
ANTIGENS PRESENT RBC DONR: NORMAL
ANTIGENS PRESENT RBC DONR: NORMAL
BASOPHILS # BLD: 0 K/UL (ref 0–0.1)
BASOPHILS NFR BLD: 0 % (ref 0–1)
BLASTS NFR BLD MANUAL: 0 %
BLD PROD TYP BPU: NORMAL
BLD PROD TYP BPU: NORMAL
BLOOD GROUP ANTIBODIES SERPL: NORMAL
BPU ID: NORMAL
BPU ID: NORMAL
BUN SERPL-MCNC: 8 MG/DL (ref 6–20)
BUN/CREAT SERPL: 11 (ref 12–20)
CALCIUM SERPL-MCNC: 8.2 MG/DL (ref 8.5–10.1)
CHLORIDE SERPL-SCNC: 103 MMOL/L (ref 97–108)
CO2 SERPL-SCNC: 32 MMOL/L (ref 21–32)
CREAT SERPL-MCNC: 0.73 MG/DL (ref 0.55–1.02)
CROSSMATCH RESULT,%XM: NORMAL
CROSSMATCH RESULT,%XM: NORMAL
DATE LAST DOSE: ABNORMAL
DIFFERENTIAL METHOD BLD: ABNORMAL
EOSINOPHIL # BLD: 0.7 K/UL (ref 0–0.4)
EOSINOPHIL NFR BLD: 8 % (ref 0–7)
ERYTHROCYTE [DISTWIDTH] IN BLOOD BY AUTOMATED COUNT: 18.3 % (ref 11.5–14.5)
GLUCOSE SERPL-MCNC: 102 MG/DL (ref 65–100)
HCT VFR BLD AUTO: 23.7 % (ref 35–47)
HGB BLD-MCNC: 7.8 G/DL (ref 11.5–16)
IMM GRANULOCYTES # BLD AUTO: 0 K/UL
IMM GRANULOCYTES NFR BLD AUTO: 0 %
LYMPHOCYTES # BLD: 3.8 K/UL (ref 0.8–3.5)
LYMPHOCYTES NFR BLD: 46 % (ref 12–49)
MCH RBC QN AUTO: 29.7 PG (ref 26–34)
MCHC RBC AUTO-ENTMCNC: 32.9 G/DL (ref 30–36.5)
MCV RBC AUTO: 90.1 FL (ref 80–99)
METAMYELOCYTES NFR BLD MANUAL: 0 %
MONOCYTES # BLD: 1.3 K/UL (ref 0–1)
MONOCYTES NFR BLD: 15 % (ref 5–13)
MYELOCYTES NFR BLD MANUAL: 0 %
NEUTS BAND NFR BLD MANUAL: 0 % (ref 0–6)
NEUTS SEG # BLD: 2.6 K/UL (ref 1.8–8)
NEUTS SEG NFR BLD: 31 % (ref 32–75)
NRBC # BLD: 0.06 K/UL (ref 0–0.01)
NRBC BLD-RTO: 0.7 PER 100 WBC
OTHER CELLS NFR BLD MANUAL: 0 %
PHYSICIAN INSTRUCTIO,%PI: NORMAL
PLATELET # BLD AUTO: 244 K/UL (ref 150–400)
PMV BLD AUTO: 11.4 FL (ref 8.9–12.9)
POTASSIUM SERPL-SCNC: 3.9 MMOL/L (ref 3.5–5.1)
PROMYELOCYTES NFR BLD MANUAL: 0 %
RBC # BLD AUTO: 2.63 M/UL (ref 3.8–5.2)
RBC MORPH BLD: ABNORMAL
REPORTED DOSE,DOSE: ABNORMAL UNITS
REPORTED DOSE/TIME,TMG: 2000
SODIUM SERPL-SCNC: 139 MMOL/L (ref 136–145)
SPECIMEN EXP DATE BLD: NORMAL
STATUS OF UNIT,%ST: NORMAL
STATUS OF UNIT,%ST: NORMAL
UNIT DIVISION, %UDIV: 0
UNIT DIVISION, %UDIV: 0
VANCOMYCIN TROUGH SERPL-MCNC: 23.9 UG/ML (ref 5–10)
WBC # BLD AUTO: 8.4 K/UL (ref 3.6–11)

## 2019-02-25 PROCEDURE — 80202 ASSAY OF VANCOMYCIN: CPT

## 2019-02-25 PROCEDURE — 74011250637 HC RX REV CODE- 250/637: Performed by: INTERNAL MEDICINE

## 2019-02-25 PROCEDURE — 85027 COMPLETE CBC AUTOMATED: CPT

## 2019-02-25 PROCEDURE — 65660000000 HC RM CCU STEPDOWN

## 2019-02-25 PROCEDURE — 74011000258 HC RX REV CODE- 258: Performed by: INTERNAL MEDICINE

## 2019-02-25 PROCEDURE — 74011250636 HC RX REV CODE- 250/636: Performed by: INTERNAL MEDICINE

## 2019-02-25 PROCEDURE — 80048 BASIC METABOLIC PNL TOTAL CA: CPT

## 2019-02-25 PROCEDURE — 77030020847 HC STATLOK BARD -A

## 2019-02-25 PROCEDURE — 74011000250 HC RX REV CODE- 250: Performed by: INTERNAL MEDICINE

## 2019-02-25 PROCEDURE — 36415 COLL VENOUS BLD VENIPUNCTURE: CPT

## 2019-02-25 RX ADMIN — HYDROMORPHONE HYDROCHLORIDE 1 MG: 1 INJECTION, SOLUTION INTRAMUSCULAR; INTRAVENOUS; SUBCUTANEOUS at 08:28

## 2019-02-25 RX ADMIN — HYDROMORPHONE HYDROCHLORIDE 1 MG: 1 INJECTION, SOLUTION INTRAMUSCULAR; INTRAVENOUS; SUBCUTANEOUS at 18:02

## 2019-02-25 RX ADMIN — Medication 10 ML: at 16:57

## 2019-02-25 RX ADMIN — HYDROMORPHONE HYDROCHLORIDE 1 MG: 1 INJECTION, SOLUTION INTRAMUSCULAR; INTRAVENOUS; SUBCUTANEOUS at 14:50

## 2019-02-25 RX ADMIN — VANCOMYCIN HYDROCHLORIDE 1000 MG: 1 INJECTION, POWDER, LYOPHILIZED, FOR SOLUTION INTRAVENOUS at 16:55

## 2019-02-25 RX ADMIN — HYDROMORPHONE HYDROCHLORIDE 1 MG: 1 INJECTION, SOLUTION INTRAMUSCULAR; INTRAVENOUS; SUBCUTANEOUS at 05:14

## 2019-02-25 RX ADMIN — LOSARTAN POTASSIUM 50 MG: 50 TABLET, FILM COATED ORAL at 08:29

## 2019-02-25 RX ADMIN — HEPARIN SODIUM 5000 UNITS: 5000 INJECTION INTRAVENOUS; SUBCUTANEOUS at 05:14

## 2019-02-25 RX ADMIN — DEXTROSE MONOHYDRATE, SODIUM CHLORIDE, AND POTASSIUM CHLORIDE 100 ML/HR: 50; 4.5; 1.49 INJECTION, SOLUTION INTRAVENOUS at 10:42

## 2019-02-25 RX ADMIN — Medication 10 ML: at 05:14

## 2019-02-25 RX ADMIN — CEFTRIAXONE SODIUM 2 G: 2 INJECTION, POWDER, FOR SOLUTION INTRAMUSCULAR; INTRAVENOUS at 13:24

## 2019-02-25 RX ADMIN — ONDANSETRON 2 MG: 2 INJECTION INTRAMUSCULAR; INTRAVENOUS at 00:33

## 2019-02-25 RX ADMIN — ACETAMINOPHEN 650 MG: 325 TABLET ORAL at 21:03

## 2019-02-25 RX ADMIN — VANCOMYCIN HYDROCHLORIDE 1000 MG: 1 INJECTION, POWDER, LYOPHILIZED, FOR SOLUTION INTRAVENOUS at 03:08

## 2019-02-25 RX ADMIN — ONDANSETRON 2 MG: 2 INJECTION INTRAMUSCULAR; INTRAVENOUS at 21:04

## 2019-02-25 RX ADMIN — CITALOPRAM HYDROBROMIDE 10 MG: 20 TABLET ORAL at 08:29

## 2019-02-25 RX ADMIN — PANTOPRAZOLE SODIUM 40 MG: 40 TABLET, DELAYED RELEASE ORAL at 06:50

## 2019-02-25 RX ADMIN — Medication 10 ML: at 21:04

## 2019-02-25 RX ADMIN — ALTEPLASE 1 MG: 2.2 INJECTION, POWDER, LYOPHILIZED, FOR SOLUTION INTRAVENOUS at 14:10

## 2019-02-25 RX ADMIN — HYDROMORPHONE HYDROCHLORIDE 1 MG: 1 INJECTION, SOLUTION INTRAMUSCULAR; INTRAVENOUS; SUBCUTANEOUS at 11:36

## 2019-02-25 RX ADMIN — HEPARIN SODIUM 5000 UNITS: 5000 INJECTION INTRAVENOUS; SUBCUTANEOUS at 21:04

## 2019-02-25 RX ADMIN — FOLIC ACID 1 MG: 1 TABLET ORAL at 08:29

## 2019-02-25 RX ADMIN — DIPHENHYDRAMINE HYDROCHLORIDE 12.5 MG: 50 INJECTION, SOLUTION INTRAMUSCULAR; INTRAVENOUS at 08:28

## 2019-02-25 RX ADMIN — HYDROMORPHONE HYDROCHLORIDE 1 MG: 1 INJECTION, SOLUTION INTRAMUSCULAR; INTRAVENOUS; SUBCUTANEOUS at 00:33

## 2019-02-25 RX ADMIN — DIPHENHYDRAMINE HYDROCHLORIDE 12.5 MG: 50 INJECTION, SOLUTION INTRAMUSCULAR; INTRAVENOUS at 00:33

## 2019-02-25 RX ADMIN — ONDANSETRON 2 MG: 2 INJECTION INTRAMUSCULAR; INTRAVENOUS at 14:50

## 2019-02-25 RX ADMIN — HEPARIN SODIUM 5000 UNITS: 5000 INJECTION INTRAVENOUS; SUBCUTANEOUS at 16:02

## 2019-02-25 RX ADMIN — HYDROMORPHONE HYDROCHLORIDE 1 MG: 1 INJECTION, SOLUTION INTRAMUSCULAR; INTRAVENOUS; SUBCUTANEOUS at 21:03

## 2019-02-25 RX ADMIN — POLYETHYLENE GLYCOL 3350 17 G: 17 POWDER, FOR SOLUTION ORAL at 08:38

## 2019-02-25 RX ADMIN — Medication 1 CAPSULE: at 08:28

## 2019-02-25 RX ADMIN — DIPHENHYDRAMINE HYDROCHLORIDE 12.5 MG: 50 INJECTION, SOLUTION INTRAMUSCULAR; INTRAVENOUS at 21:04

## 2019-02-25 RX ADMIN — DIPHENHYDRAMINE HYDROCHLORIDE 50 MG: 25 CAPSULE ORAL at 14:50

## 2019-02-25 RX ADMIN — ONDANSETRON 2 MG: 2 INJECTION INTRAMUSCULAR; INTRAVENOUS at 08:29

## 2019-02-25 NOTE — PROGRESS NOTES
Bedside and Verbal shift change report given to [address]  (oncoming nurse) by Sandra Camilo  (offgoing nurse). Report included the following information SBAR, Kardex and MAR.  
 
1000 called PICC team and  Dr Trina Benjamin to report that port was not flushing, verbal to order Cath flow,  
1100 called report to [address] @[phone] for transfer

## 2019-02-25 NOTE — PROGRESS NOTES
Pharmacy Automatic Renal Dosing Protocol - Antimicrobials Indication for Antimicrobials: Empiric for fever, possible line infection Current Regimen of Each Antimicrobial: 
Vancomycin 1000 mg IV every 8 hours (Start Date ; Day #9) Ceftriaxone 2 g IV every 24 hours (Start Date ; Day #9) Previous Antimicrobial Therapy: 
None Goal Level: VANCOMYCIN TROUGH GOAL RANGE Vancomycin Trough: 15 - 20 mcg/mL Date Dose & Interval Measured (mcg/mL) Extrapolated (mcg/mL)  
[email or web address] 900 mg IV q8h 11.1 9.6  
[email or web address] 1250 mg IV q8h 20.1 18.5  
19  0314 1000mg IV q18h 23.9 18.9 Significant Cultures:  
19 PBC- NGX5d - FINAL Radiology / Imaging results: (X-ray, CT scan or MRI):  
Negative Paralysis, amputations, malnutrition: None Labs: 
Recent Labs  
  19 
0314 19 
0220 19 
1235 CREA 0.73  --  0.77 BUN 8  --  7 WBC 8.4 7.7 9.3 Temp (24hrs), Av.9 °F (37.2 °C), Min:98.7 °F (37.1 °C), Max:99.1 °F (37.3 °C) Creatinine Clearance (mL/min) or Dialysis: 
Estimated Creatinine Clearance: 93.1 mL/min (based on SCr of 0.73 mg/dL). Estimated Creatinine Clearance (using IBW):93.1 mL/min Impression/Plan: · Vancomycin Trough on  extrapolated to ~18.9. Scr stable. Will continue current regimen. · Cultures are no growth. Would recommend placing stop date as appropriate. · CBC amd BMP already ordered [facility] · Antimicrobial stop date pending Pharmacy will follow daily and adjust medications as appropriate for renal function and/or serum levels.  
Roxy Owens PHARMD

## 2019-02-25 NOTE — PROGRESS NOTES
CM received handoff from KELVIN Vital. No identified CM needs at this time. CM will continue to follow for support, discharge planning as needed. TEMITOPE Peralta, LSW Supervisee in Social Work [address] [place] [address] [phone]

## 2019-02-25 NOTE — ROUTINE PROCESS
Bedside shift change report given to [facility] (oncoming nurse) by James Tao (offgoing nurse). Report included the following information SBAR, Kardex, ED Summary, Intake/Output and MAR Pt complained of pain several times during shift that was managed with ordered PRN meds. Up adlib to the bathroom. Tolerated all meals 100%. IS used independently during shift.

## 2019-02-25 NOTE — PROGRESS NOTES
Patient had 12 p.m. Dose of vancomycin due however patient's IJ line is not functioning properly. PICC team advised nurse that cathflo needs to stay in line to enable proper operation. Will hold administration of vanc until PICC team is complete with cathflo administration. Once I hang vancomycin dose, will call pharmacy to adjust additional doses.

## 2019-02-25 NOTE — PROGRESS NOTES
Declot intervention was explained to patient. Initiated declot interventions of cathflo at 26 Instilled 1mg/1ml Cathflo to Blue port. Port capped and labeled not to use until evaluated by VAT or Nursing Supervisor. Patient tolerated procedure well. Primary nurse aware. Refer to [facility] and Docflow sheet for specifics. 1620--Declot follow up. Aspirated instilled Cathflo  1mg/1 ml post 120 minute dwell. Noted positive  blood return from [address]  port. 5 ml of blood wasted from BLUE  port. Flushed with 20 ml NS. Primary nurse SAMIR Campbell, is aware of successful declot intervention. Cristina Whitman RN, [facility]  Vascular Access Team

## 2019-02-26 PROCEDURE — 65660000000 HC RM CCU STEPDOWN

## 2019-02-26 PROCEDURE — 74011250636 HC RX REV CODE- 250/636: Performed by: INTERNAL MEDICINE

## 2019-02-26 PROCEDURE — 74011250637 HC RX REV CODE- 250/637: Performed by: INTERNAL MEDICINE

## 2019-02-26 RX ADMIN — Medication 10 ML: at 06:20

## 2019-02-26 RX ADMIN — PANTOPRAZOLE SODIUM 40 MG: 40 TABLET, DELAYED RELEASE ORAL at 08:48

## 2019-02-26 RX ADMIN — HYDROMORPHONE HYDROCHLORIDE 1 MG: 1 INJECTION, SOLUTION INTRAMUSCULAR; INTRAVENOUS; SUBCUTANEOUS at 12:44

## 2019-02-26 RX ADMIN — HYDROMORPHONE HYDROCHLORIDE 1 MG: 1 INJECTION, SOLUTION INTRAMUSCULAR; INTRAVENOUS; SUBCUTANEOUS at 03:22

## 2019-02-26 RX ADMIN — HYDROMORPHONE HYDROCHLORIDE 1 MG: 1 INJECTION, SOLUTION INTRAMUSCULAR; INTRAVENOUS; SUBCUTANEOUS at 22:04

## 2019-02-26 RX ADMIN — Medication 30 ML: at 22:00

## 2019-02-26 RX ADMIN — HEPARIN SODIUM 5000 UNITS: 5000 INJECTION INTRAVENOUS; SUBCUTANEOUS at 14:48

## 2019-02-26 RX ADMIN — DEXTROSE MONOHYDRATE, SODIUM CHLORIDE, AND POTASSIUM CHLORIDE 100 ML/HR: 50; 4.5; 1.49 INJECTION, SOLUTION INTRAVENOUS at 20:45

## 2019-02-26 RX ADMIN — HYDROMORPHONE HYDROCHLORIDE 1 MG: 1 INJECTION, SOLUTION INTRAMUSCULAR; INTRAVENOUS; SUBCUTANEOUS at 06:19

## 2019-02-26 RX ADMIN — HYDROMORPHONE HYDROCHLORIDE 1 MG: 1 INJECTION, SOLUTION INTRAMUSCULAR; INTRAVENOUS; SUBCUTANEOUS at 00:23

## 2019-02-26 RX ADMIN — CITALOPRAM HYDROBROMIDE 10 MG: 20 TABLET ORAL at 08:48

## 2019-02-26 RX ADMIN — Medication 10 ML: at 16:04

## 2019-02-26 RX ADMIN — DIPHENHYDRAMINE HYDROCHLORIDE 12.5 MG: 50 INJECTION, SOLUTION INTRAMUSCULAR; INTRAVENOUS at 09:37

## 2019-02-26 RX ADMIN — ONDANSETRON 2 MG: 2 INJECTION INTRAMUSCULAR; INTRAVENOUS at 03:22

## 2019-02-26 RX ADMIN — POLYETHYLENE GLYCOL 3350 17 G: 17 POWDER, FOR SOLUTION ORAL at 08:48

## 2019-02-26 RX ADMIN — HYDROMORPHONE HYDROCHLORIDE 1 MG: 1 INJECTION, SOLUTION INTRAMUSCULAR; INTRAVENOUS; SUBCUTANEOUS at 09:37

## 2019-02-26 RX ADMIN — HEPARIN SODIUM 5000 UNITS: 5000 INJECTION INTRAVENOUS; SUBCUTANEOUS at 21:58

## 2019-02-26 RX ADMIN — ONDANSETRON 2 MG: 2 INJECTION INTRAMUSCULAR; INTRAVENOUS at 09:37

## 2019-02-26 RX ADMIN — ONDANSETRON 2 MG: 2 INJECTION INTRAMUSCULAR; INTRAVENOUS at 21:59

## 2019-02-26 RX ADMIN — DIPHENHYDRAMINE HYDROCHLORIDE 12.5 MG: 50 INJECTION, SOLUTION INTRAMUSCULAR; INTRAVENOUS at 21:59

## 2019-02-26 RX ADMIN — FOLIC ACID 1 MG: 1 TABLET ORAL at 08:48

## 2019-02-26 RX ADMIN — Medication 1 CAPSULE: at 08:48

## 2019-02-26 RX ADMIN — HYDROMORPHONE HYDROCHLORIDE 1 MG: 1 INJECTION, SOLUTION INTRAMUSCULAR; INTRAVENOUS; SUBCUTANEOUS at 16:05

## 2019-02-26 RX ADMIN — LOSARTAN POTASSIUM 50 MG: 50 TABLET, FILM COATED ORAL at 08:48

## 2019-02-26 RX ADMIN — HYDROMORPHONE HYDROCHLORIDE 1 MG: 1 INJECTION, SOLUTION INTRAMUSCULAR; INTRAVENOUS; SUBCUTANEOUS at 18:55

## 2019-02-26 RX ADMIN — DIPHENHYDRAMINE HYDROCHLORIDE 12.5 MG: 50 INJECTION, SOLUTION INTRAMUSCULAR; INTRAVENOUS at 03:21

## 2019-02-26 RX ADMIN — ONDANSETRON 2 MG: 2 INJECTION INTRAMUSCULAR; INTRAVENOUS at 16:05

## 2019-02-26 RX ADMIN — DIPHENHYDRAMINE HYDROCHLORIDE 12.5 MG: 50 INJECTION, SOLUTION INTRAMUSCULAR; INTRAVENOUS at 16:05

## 2019-02-26 RX ADMIN — HEPARIN SODIUM 5000 UNITS: 5000 INJECTION INTRAVENOUS; SUBCUTANEOUS at 05:42

## 2019-02-26 NOTE — PROGRESS NOTES
Bedside and Verbal shift change report given to Raul Lozano RN (oncoming nurse) by Charlotte Bee RN (offgoing nurse). Report included the following information SBAR, Kardex and MAR.

## 2019-02-26 NOTE — PROGRESS NOTES
General Daily Progress Note Admit Date: 2/10/2019 Subjective:  
 
Patient has no complaint . Current Facility-Administered Medications Medication Dose Route Frequency  vancomycin (VANCOCIN) 1,000 mg in 0.9% sodium chloride (MBP/ADV) 250 mL  1,000 mg IntraVENous Q8H  
 0.9% sodium chloride infusion 250 mL  250 mL IntraVENous PRN  
 lactobac ac& pc-s.therm-b.anim (CARLITOS Q/RISAQUAD)  1 Cap Oral DAILY  HYDROcodone-acetaminophen (NORCO) 7.5-325 mg per tablet 1 Tab  1 Tab Oral Q4H PRN  
 cefTRIAXone (ROCEPHIN) 2 g in 0.9% sodium chloride (MBP/ADV) 50 mL  2 g IntraVENous Q24H  
 0.9% sodium chloride infusion 250 mL  250 mL IntraVENous PRN  
 acetaminophen (TYLENOL) tablet 650 mg  650 mg Oral Q4H PRN  
 HYDROmorphone (PF) (DILAUDID) injection 1 mg  1 mg IntraVENous Q3H PRN  
 sodium chloride (NS) flush 10-30 mL  10-30 mL InterCATHeter PRN  
 sodium chloride (NS) flush 10-40 mL  10-40 mL InterCATHeter Q8H  
 losartan (COZAAR) tablet 50 mg  50 mg Oral DAILY  dextrose 5% - 0.45% NaCl with KCl 20 mEq/L infusion  100 mL/hr IntraVENous CONTINUOUS  
 citalopram (CELEXA) tablet 10 mg  10 mg Oral DAILY  diphenhydrAMINE (BENADRYL) capsule 50 mg  50 mg Oral DAILY PRN  
 fentaNYL (DURAGESIC) 75 mcg/hr patch 1 Patch  1 Patch TransDERmal Q72H  folic acid (FOLVITE) tablet 1 mg  1 mg Oral DAILY  sodium chloride (NS) flush 5-40 mL  5-40 mL IntraVENous Q8H  
 sodium chloride (NS) flush 5-40 mL  5-40 mL IntraVENous PRN  
 naloxone (NARCAN) injection 0.4 mg  0.4 mg IntraVENous PRN  
 ondansetron (ZOFRAN) injection 2 mg  2 mg IntraVENous Q6H PRN  
 bisacodyl (DULCOLAX) tablet 5 mg  5 mg Oral DAILY PRN  
 nicotine (NICODERM CQ) 7 mg/24 hr patch 1 Patch  1 Patch TransDERmal DAILY PRN  
 heparin (porcine) injection 5,000 Units  5,000 Units SubCUTAneous Q8H  
 diphenhydrAMINE (BENADRYL) injection 12.5 mg  12.5 mg IntraVENous Q6H PRN  polyethylene glycol (MIRALAX) packet 17 g  17 g Oral DAILY  pantoprazole (PROTONIX) tablet 40 mg  40 mg Oral ACB Review of Systems A comprehensive review of systems was negative. Objective:  
 
Patient Vitals for the past 24 hrs: 
 BP Temp Pulse Resp SpO2  
02/25/19 1814 (!) 134/96 99.8 °F (37.7 °C) 85 16 97 % 02/25/19 1329 136/75 98.7 °F (37.1 °C) 86  98 % 02/25/19 1117 110/73 99.5 °F (37.5 °C) 81 16 98 % 02/25/19 0722 118/78 98.9 °F (37.2 °C) 76 16   
02/25/19 0330 105/57 98.8 °F (37.1 °C) 78 16 98 % 02/24/19 2342 115/72 98.9 °F (37.2 °C) 82 16 94 % No intake/output data recorded. 02/24 0701 - 02/25 1900 In: 4119 [P.O.:1780; I.V.:950] Out: 300 [Urine:300] Physical Exam:  
Visit Vitals BP (!) 134/96 Pulse 85 Temp 99.8 °F (37.7 °C) Resp 16 Ht 5' 10\" (1.778 m) Wt 177 lb 11.1 oz (80.6 kg) SpO2 97% Breastfeeding? No  
BMI 25.50 kg/m² General appearance: alert, cooperative, no distress, appears stated age Neck: supple, symmetrical, trachea midline, no adenopathy, thyroid: not enlarged, symmetric, no tenderness/mass/nodules, no carotid bruit and no JVD Lungs: clear to auscultation bilaterally Heart: regular rate and rhythm, S1, S2 normal, no murmur, click, rub or gallop Abdomen: soft, non-tender. Bowel sounds normal. No masses,  no organomegaly Extremities: extremities normal, atraumatic, no cyanosis or edema Data Review Recent Results (from the past 24 hour(s)) METABOLIC PANEL, BASIC Collection Time: 02/25/19  3:14 AM  
Result Value Ref Range Sodium 139 136 - 145 mmol/L Potassium 3.9 3.5 - 5.1 mmol/L Chloride 103 97 - 108 mmol/L  
 CO2 32 21 - 32 mmol/L Anion gap 4 (L) 5 - 15 mmol/L Glucose 102 (H) 65 - 100 mg/dL BUN 8 6 - 20 MG/DL Creatinine 0.73 0.55 - 1.02 MG/DL  
 BUN/Creatinine ratio 11 (L) 12 - 20 GFR est AA >60 >60 ml/min/1.73m2 GFR est non-AA >60 >60 ml/min/1.73m2 Calcium 8.2 (L) 8.5 - 10.1 MG/DL  
CBC WITH MANUAL DIFF  Collection Time: 02/25/19  3:14 AM  
 Result Value Ref Range WBC 8.4 3.6 - 11.0 K/uL  
 RBC 2.63 (L) 3.80 - 5.20 M/uL HGB 7.8 (L) 11.5 - 16.0 g/dL HCT 23.7 (L) 35.0 - 47.0 % MCV 90.1 80.0 - 99.0 FL  
 MCH 29.7 26.0 - 34.0 PG  
 MCHC 32.9 30.0 - 36.5 g/dL  
 RDW 18.3 (H) 11.5 - 14.5 % PLATELET 819 210 - 058 K/uL MPV 11.4 8.9 - 12.9 FL  
 NRBC 0.7 (H) 0  WBC ABSOLUTE NRBC 0.06 (H) 0.00 - 0.01 K/uL NEUTROPHILS 31 (L) 32 - 75 % BAND NEUTROPHILS 0 0 - 6 % LYMPHOCYTES 46 12 - 49 % MONOCYTES 15 (H) 5 - 13 % EOSINOPHILS 8 (H) 0 - 7 % BASOPHILS 0 0 - 1 % METAMYELOCYTES 0 0 % MYELOCYTES 0 0 % PROMYELOCYTES 0 0 % BLASTS 0 0 % OTHER CELL 0 0 IMMATURE GRANULOCYTES 0 %  
 ABS. NEUTROPHILS 2.6 1.8 - 8.0 K/UL  
 ABS. LYMPHOCYTES 3.8 (H) 0.8 - 3.5 K/UL  
 ABS. MONOCYTES 1.3 (H) 0.0 - 1.0 K/UL  
 ABS. EOSINOPHILS 0.7 (H) 0.0 - 0.4 K/UL  
 ABS. BASOPHILS 0.0 0.0 - 0.1 K/UL  
 ABS. IMM. GRANS. 0.0 K/UL  
 DF MANUAL    
 RBC COMMENTS ANISOCYTOSIS 2+ 
    
 RBC COMMENTS TARGET CELLS 2+ 
    
 RBC COMMENTS POLYCHROMASIA 1+ Emmett Zabala Collection Time: 02/25/19  3:14 AM  
Result Value Ref Range Vancomycin,trough 23.9 (HH) 5.0 - 10.0 ug/mL Reported dose date: [id number] Reported dose time: 2000 Reported dose: 1000MG UNITS Assessment:  
 
Principal Problem: 
  Sickle cell disease ([facility] (2/10/2019) Active Problems: 
  Sick[facility] ([facility] (10/3/2014) Hypertension (10/3/2014) Hepatitis C (10/3/2014) Depression (10/5/2014) Sickle cell pain crisis ([facility] (9/8/2018) Plan: 1. Pain appears to be better since transfusion. 2.  Plan to reduce analgesics tomorrow. 3.  Antibiotics to be stopped.

## 2019-02-26 NOTE — PROGRESS NOTES
General Daily Progress Note Admit Date: 2/10/2019 Subjective:  
 
Patient continues to complain of pain although improving. Current Facility-Administered Medications Medication Dose Route Frequency  0.9% sodium chloride infusion 250 mL  250 mL IntraVENous PRN  
 lactobac ac& pc-s.therm-b.anim (CARLITOS Q/RISAQUAD)  1 Cap Oral DAILY  HYDROcodone-acetaminophen (NORCO) 7.5-325 mg per tablet 1 Tab  1 Tab Oral Q4H PRN  
 0.9% sodium chloride infusion 250 mL  250 mL IntraVENous PRN  
 acetaminophen (TYLENOL) tablet 650 mg  650 mg Oral Q4H PRN  
 HYDROmorphone (PF) (DILAUDID) injection 1 mg  1 mg IntraVENous Q3H PRN  
 sodium chloride (NS) flush 10-30 mL  10-30 mL InterCATHeter PRN  
 sodium chloride (NS) flush 10-40 mL  10-40 mL InterCATHeter Q8H  
 losartan (COZAAR) tablet 50 mg  50 mg Oral DAILY  dextrose 5% - 0.45% NaCl with KCl 20 mEq/L infusion  100 mL/hr IntraVENous CONTINUOUS  
 citalopram (CELEXA) tablet 10 mg  10 mg Oral DAILY  diphenhydrAMINE (BENADRYL) capsule 50 mg  50 mg Oral DAILY PRN  
 fentaNYL (DURAGESIC) 75 mcg/hr patch 1 Patch  1 Patch TransDERmal Q72H  folic acid (FOLVITE) tablet 1 mg  1 mg Oral DAILY  sodium chloride (NS) flush 5-40 mL  5-40 mL IntraVENous Q8H  
 sodium chloride (NS) flush 5-40 mL  5-40 mL IntraVENous PRN  
 naloxone (NARCAN) injection 0.4 mg  0.4 mg IntraVENous PRN  
 ondansetron (ZOFRAN) injection 2 mg  2 mg IntraVENous Q6H PRN  
 bisacodyl (DULCOLAX) tablet 5 mg  5 mg Oral DAILY PRN  
 nicotine (NICODERM CQ) 7 mg/24 hr patch 1 Patch  1 Patch TransDERmal DAILY PRN  
 heparin (porcine) injection 5,000 Units  5,000 Units SubCUTAneous Q8H  
 diphenhydrAMINE (BENADRYL) injection 12.5 mg  12.5 mg IntraVENous Q6H PRN  polyethylene glycol (MIRALAX) packet 17 g  17 g Oral DAILY  pantoprazole (PROTONIX) tablet 40 mg  40 mg Oral ACB Review of Systems A comprehensive review of systems was negative. Objective: Patient Vitals for the past 24 hrs: 
 BP Temp Pulse Resp SpO2  
02/26/19 0741 126/66 98.4 °F (36.9 °C) 72 16 100 % 02/26/19 0320 129/72 98.4 °F (36.9 °C) 70 16 99 % 02/26/19 0106 114/73 98.1 °F (36.7 °C) 74 18 98 % 02/25/19 2022 118/70 99 °F (37.2 °C) 80 16 99 % 02/25/19 1814 (!) 134/96 99.8 °F (37.7 °C) 85 16 97 % 02/25/19 1329 136/75 98.7 °F (37.1 °C) 86  98 % 02/25/19 1117 110/73 99.5 °F (37.5 °C) 81 16 98 % No intake/output data recorded. 02/24 1901 - 02/26 0700 In: 7220 [P.O.:400; I.V.:950] Out: 300 [Urine:300] Physical Exam:  
Visit Vitals /66 Pulse 72 Temp 98.4 °F (36.9 °C) Resp 16 Ht 5' 10\" (1.778 m) Wt 177 lb 11.1 oz (80.6 kg) SpO2 100% Breastfeeding? No  
BMI 25.50 kg/m² General appearance: alert, cooperative, no distress, appears stated age Neck: supple, symmetrical, trachea midline, no adenopathy, thyroid: not enlarged, symmetric, no tenderness/mass/nodules, no carotid bruit and no JVD Lungs: clear to auscultation bilaterally Heart: regular rate and rhythm, S1, S2 normal, no murmur, click, rub or gallop Abdomen: soft, non-tender. Bowel sounds normal. No masses,  no organomegaly Extremities: extremities normal, atraumatic, no cyanosis or edema Data Review No results found for this or any previous visit (from the past 24 hour(s)). Assessment:  
 
Principal Problem: 
  Sick[facility] ([facility] (2/10/2019) Active Problems: 
  Sickle cell anemia ([facility] (10/3/2014) Hypertension (10/3/2014) Hepatitis C (10/3/2014) Depression (10/5/2014) Sickle cell pain crisis ([facility] (9/8/2018) Plan: 1. Painful crises continues to improve. Continue to mobilize. Reduction in analgesics pending.

## 2019-02-27 LAB
ANION GAP SERPL CALC-SCNC: 4 MMOL/L (ref 5–15)
BUN SERPL-MCNC: 8 MG/DL (ref 6–20)
BUN/CREAT SERPL: 12 (ref 12–20)
CALCIUM SERPL-MCNC: 8.9 MG/DL (ref 8.5–10.1)
CHLORIDE SERPL-SCNC: 103 MMOL/L (ref 97–108)
CO2 SERPL-SCNC: 31 MMOL/L (ref 21–32)
CREAT SERPL-MCNC: 0.68 MG/DL (ref 0.55–1.02)
GLUCOSE SERPL-MCNC: 78 MG/DL (ref 65–100)
POTASSIUM SERPL-SCNC: 4.2 MMOL/L (ref 3.5–5.1)
SODIUM SERPL-SCNC: 138 MMOL/L (ref 136–145)

## 2019-02-27 PROCEDURE — 80048 BASIC METABOLIC PNL TOTAL CA: CPT

## 2019-02-27 PROCEDURE — 36415 COLL VENOUS BLD VENIPUNCTURE: CPT

## 2019-02-27 PROCEDURE — 74011250636 HC RX REV CODE- 250/636: Performed by: INTERNAL MEDICINE

## 2019-02-27 PROCEDURE — 65660000000 HC RM CCU STEPDOWN

## 2019-02-27 PROCEDURE — 36591 DRAW BLOOD OFF VENOUS DEVICE: CPT

## 2019-02-27 PROCEDURE — 74011250637 HC RX REV CODE- 250/637: Performed by: INTERNAL MEDICINE

## 2019-02-27 RX ORDER — HYDROMORPHONE HYDROCHLORIDE 1 MG/ML
1 INJECTION, SOLUTION INTRAMUSCULAR; INTRAVENOUS; SUBCUTANEOUS
Status: DISCONTINUED | OUTPATIENT
Start: 2019-02-27 | End: 2019-03-02 | Stop reason: HOSPADM

## 2019-02-27 RX ORDER — HYDROCODONE BITARTRATE AND ACETAMINOPHEN 10; 325 MG/1; MG/1
1 TABLET ORAL
Status: DISCONTINUED | OUTPATIENT
Start: 2019-02-27 | End: 2019-02-28

## 2019-02-27 RX ADMIN — Medication 10 ML: at 13:24

## 2019-02-27 RX ADMIN — FOLIC ACID 1 MG: 1 TABLET ORAL at 08:29

## 2019-02-27 RX ADMIN — HYDROMORPHONE HYDROCHLORIDE 1 MG: 1 INJECTION, SOLUTION INTRAMUSCULAR; INTRAVENOUS; SUBCUTANEOUS at 13:25

## 2019-02-27 RX ADMIN — HEPARIN SODIUM 5000 UNITS: 5000 INJECTION INTRAVENOUS; SUBCUTANEOUS at 21:39

## 2019-02-27 RX ADMIN — HYDROCODONE BITARTRATE AND ACETAMINOPHEN 1 TABLET: 10; 325 TABLET ORAL at 10:59

## 2019-02-27 RX ADMIN — ONDANSETRON 2 MG: 2 INJECTION INTRAMUSCULAR; INTRAVENOUS at 10:58

## 2019-02-27 RX ADMIN — Medication 1 CAPSULE: at 08:29

## 2019-02-27 RX ADMIN — HYDROMORPHONE HYDROCHLORIDE 1 MG: 1 INJECTION, SOLUTION INTRAMUSCULAR; INTRAVENOUS; SUBCUTANEOUS at 19:41

## 2019-02-27 RX ADMIN — DIPHENHYDRAMINE HYDROCHLORIDE 12.5 MG: 50 INJECTION, SOLUTION INTRAMUSCULAR; INTRAVENOUS at 01:00

## 2019-02-27 RX ADMIN — DEXTROSE MONOHYDRATE, SODIUM CHLORIDE, AND POTASSIUM CHLORIDE 100 ML/HR: 50; 4.5; 1.49 INJECTION, SOLUTION INTRAVENOUS at 06:44

## 2019-02-27 RX ADMIN — ONDANSETRON 2 MG: 2 INJECTION INTRAMUSCULAR; INTRAVENOUS at 23:17

## 2019-02-27 RX ADMIN — PANTOPRAZOLE SODIUM 40 MG: 40 TABLET, DELAYED RELEASE ORAL at 06:43

## 2019-02-27 RX ADMIN — Medication 10 ML: at 04:21

## 2019-02-27 RX ADMIN — HYDROCODONE BITARTRATE AND ACETAMINOPHEN 1 TABLET: 10; 325 TABLET ORAL at 17:00

## 2019-02-27 RX ADMIN — ONDANSETRON 2 MG: 2 INJECTION INTRAMUSCULAR; INTRAVENOUS at 17:00

## 2019-02-27 RX ADMIN — HYDROCODONE BITARTRATE AND ACETAMINOPHEN 1 TABLET: 10; 325 TABLET ORAL at 23:07

## 2019-02-27 RX ADMIN — LOSARTAN POTASSIUM 50 MG: 50 TABLET, FILM COATED ORAL at 08:29

## 2019-02-27 RX ADMIN — Medication 10 ML: at 04:40

## 2019-02-27 RX ADMIN — CITALOPRAM HYDROBROMIDE 10 MG: 20 TABLET ORAL at 08:29

## 2019-02-27 RX ADMIN — ONDANSETRON 2 MG: 2 INJECTION INTRAMUSCULAR; INTRAVENOUS at 04:23

## 2019-02-27 RX ADMIN — Medication 20 ML: at 06:44

## 2019-02-27 RX ADMIN — HEPARIN SODIUM 5000 UNITS: 5000 INJECTION INTRAVENOUS; SUBCUTANEOUS at 06:44

## 2019-02-27 RX ADMIN — DEXTROSE MONOHYDRATE, SODIUM CHLORIDE, AND POTASSIUM CHLORIDE 50 ML/HR: 50; 4.5; 1.49 INJECTION, SOLUTION INTRAVENOUS at 22:39

## 2019-02-27 RX ADMIN — Medication 10 ML: at 01:05

## 2019-02-27 RX ADMIN — HYDROMORPHONE HYDROCHLORIDE 1 MG: 1 INJECTION, SOLUTION INTRAMUSCULAR; INTRAVENOUS; SUBCUTANEOUS at 01:00

## 2019-02-27 RX ADMIN — DIPHENHYDRAMINE HYDROCHLORIDE 12.5 MG: 50 INJECTION, SOLUTION INTRAMUSCULAR; INTRAVENOUS at 17:00

## 2019-02-27 RX ADMIN — POLYETHYLENE GLYCOL 3350 17 G: 17 POWDER, FOR SOLUTION ORAL at 08:29

## 2019-02-27 RX ADMIN — DIPHENHYDRAMINE HYDROCHLORIDE 12.5 MG: 50 INJECTION, SOLUTION INTRAMUSCULAR; INTRAVENOUS at 10:56

## 2019-02-27 RX ADMIN — HYDROMORPHONE HYDROCHLORIDE 1 MG: 1 INJECTION, SOLUTION INTRAMUSCULAR; INTRAVENOUS; SUBCUTANEOUS at 07:32

## 2019-02-27 RX ADMIN — HYDROMORPHONE HYDROCHLORIDE 1 MG: 1 INJECTION, SOLUTION INTRAMUSCULAR; INTRAVENOUS; SUBCUTANEOUS at 04:19

## 2019-02-27 RX ADMIN — DIPHENHYDRAMINE HYDROCHLORIDE 12.5 MG: 50 INJECTION, SOLUTION INTRAMUSCULAR; INTRAVENOUS at 04:22

## 2019-02-27 RX ADMIN — HEPARIN SODIUM 5000 UNITS: 5000 INJECTION INTRAVENOUS; SUBCUTANEOUS at 13:24

## 2019-02-27 RX ADMIN — Medication 10 ML: at 23:11

## 2019-02-27 NOTE — PROGRESS NOTES
Problem: Falls - Risk of 
Goal: *Absence of Falls Document [place] Rank Fall Risk and appropriate interventions in the flowsheet. Outcome: Progressing Towards Goal 
Fall Risk Interventions: 
  
 
Mentation Interventions: Adequate sleep, hydration, pain control Medication Interventions: Assess postural VS orthostatic hypotension, Patient to call before getting OOB, Teach patient to arise slowly

## 2019-02-27 NOTE — PROGRESS NOTES
Bedside shift change report given to Daiana Robertson (oncoming nurse) by Bobby Warner RN (offgoing nurse). Report included the following information SBAR, Kardex, ED Summary, Intake/Output, MAR and Recent Results.

## 2019-02-27 NOTE — PROGRESS NOTES
Bedside and Verbal shift change report given to SAMIR Fernandez (oncoming nurse) by Fausto Contreras RN (offgoing nurse). Report included the following information SBAR, Kardex and MAR.

## 2019-02-27 NOTE — PROGRESS NOTES
General Daily Progress Note Admit Date: 2/10/2019 Subjective:  
 
Patient remains in pain but improving. Current Facility-Administered Medications Medication Dose Route Frequency  HYDROmorphone (PF) (DILAUDID) injection 1 mg  1 mg IntraVENous Q6H PRN  
 HYDROcodone-acetaminophen (NORCO)  mg tablet 1 Tab  1 Tab Oral Q6H PRN  
 lactobac ac& pc-s.therm-b.anim (CARLITOS Q/RISAQUAD)  1 Cap Oral DAILY  acetaminophen (TYLENOL) tablet 650 mg  650 mg Oral Q4H PRN  
 losartan (COZAAR) tablet 50 mg  50 mg Oral DAILY  dextrose 5% - 0.45% NaCl with KCl 20 mEq/L infusion  100 mL/hr IntraVENous CONTINUOUS  
 citalopram (CELEXA) tablet 10 mg  10 mg Oral DAILY  diphenhydrAMINE (BENADRYL) capsule 50 mg  50 mg Oral DAILY PRN  
 fentaNYL (DURAGESIC) 75 mcg/hr patch 1 Patch  1 Patch TransDERmal Q72H  folic acid (FOLVITE) tablet 1 mg  1 mg Oral DAILY  sodium chloride (NS) flush 5-40 mL  5-40 mL IntraVENous Q8H  
 sodium chloride (NS) flush 5-40 mL  5-40 mL IntraVENous PRN  
 naloxone (NARCAN) injection 0.4 mg  0.4 mg IntraVENous PRN  
 ondansetron (ZOFRAN) injection 2 mg  2 mg IntraVENous Q6H PRN  
 bisacodyl (DULCOLAX) tablet 5 mg  5 mg Oral DAILY PRN  
 nicotine (NICODERM CQ) 7 mg/24 hr patch 1 Patch  1 Patch TransDERmal DAILY PRN  
 heparin (porcine) injection 5,000 Units  5,000 Units SubCUTAneous Q8H  
 diphenhydrAMINE (BENADRYL) injection 12.5 mg  12.5 mg IntraVENous Q6H PRN  polyethylene glycol (MIRALAX) packet 17 g  17 g Oral DAILY  pantoprazole (PROTONIX) tablet 40 mg  40 mg Oral ACB Review of Systems A comprehensive review of systems was negative. Objective:  
 
Patient Vitals for the past 24 hrs: 
 BP Temp Pulse Resp SpO2  
02/27/19 0820 118/68 98.2 °F (36.8 °C) 75 18 96 % 02/27/19 0416 115/63 98.9 °F (37.2 °C) 80 18 99 % 02/26/19 2036 119/78 99.3 °F (37.4 °C) 82 18 94 % 02/26/19 1637 116/87 98.5 °F (36.9 °C) 80 16 100 % 02/26/19 1214 132/70 98.2 °F (36.8 °C) 74 16 100 % No intake/output data recorded. 02/25 1901 - 02/27 0700 In: 2005 [P.O.:800; I.V.:1205] Out: - Physical Exam:  
Visit Vitals /68 (BP 1 Location: Right arm, BP Patient Position: At rest) Pulse 75 Temp 98.2 °F (36.8 °C) Resp 18 Ht 5' 10\" (1.778 m) Wt 177 lb 11.1 oz (80.6 kg) SpO2 96% Breastfeeding? No  
BMI 25.50 kg/m² General appearance: alert, cooperative, no distress, appears stated age Neck: supple, symmetrical, trachea midline, no adenopathy, thyroid: not enlarged, symmetric, no tenderness/mass/nodules, no carotid bruit and no JVD Lungs: clear to auscultation bilaterally Heart: regular rate and rhythm, S1, S2 normal, no murmur, click, rub or gallop Abdomen: soft, non-tender. Bowel sounds normal. No masses,  no organomegaly Extremities: extremities normal, atraumatic, no cyanosis or edema Data Review Recent Results (from the past 24 hour(s)) METABOLIC PANEL, BASIC Collection Time: 02/27/19  4:40 AM  
Result Value Ref Range Sodium 138 136 - 145 mmol/L Potassium 4.2 3.5 - 5.1 mmol/L Chloride 103 97 - 108 mmol/L  
 CO2 31 21 - 32 mmol/L Anion gap 4 (L) 5 - 15 mmol/L Glucose 78 65 - 100 mg/dL BUN 8 6 - 20 MG/DL Creatinine 0.68 0.55 - 1.02 MG/DL  
 BUN/Creatinine ratio 12 12 - 20 GFR est AA >60 >60 ml/min/1.73m2 GFR est non-AA >60 >60 ml/min/1.73m2 Calcium 8.9 8.5 - 10.1 MG/DL Assessment:  
 
Principal Problem: 
  [facility] ([facility] (2/10/2019) Active Problems: 
  Sickle cell anemia ([facility] (10/3/2014) Hypertension (10/3/2014) Hepatitis C (10/3/2014) Depression (10/5/2014) Sickle cell pain crisis ([address] (9/8/2018) Plan:  
 
1. We will begin to wean analgesics today. Pain control will be optimized however.

## 2019-02-28 PROCEDURE — 74011250637 HC RX REV CODE- 250/637: Performed by: INTERNAL MEDICINE

## 2019-02-28 PROCEDURE — 65660000000 HC RM CCU STEPDOWN

## 2019-02-28 PROCEDURE — 74011250636 HC RX REV CODE- 250/636: Performed by: INTERNAL MEDICINE

## 2019-02-28 RX ORDER — HYDROCODONE BITARTRATE AND ACETAMINOPHEN 10; 325 MG/1; MG/1
1 TABLET ORAL
Status: DISCONTINUED | OUTPATIENT
Start: 2019-02-28 | End: 2019-03-01

## 2019-02-28 RX ADMIN — LOSARTAN POTASSIUM 50 MG: 50 TABLET, FILM COATED ORAL at 10:04

## 2019-02-28 RX ADMIN — Medication 10 ML: at 14:59

## 2019-02-28 RX ADMIN — DIPHENHYDRAMINE HYDROCHLORIDE 12.5 MG: 50 INJECTION, SOLUTION INTRAMUSCULAR; INTRAVENOUS at 06:35

## 2019-02-28 RX ADMIN — CITALOPRAM HYDROBROMIDE 10 MG: 20 TABLET ORAL at 10:05

## 2019-02-28 RX ADMIN — HEPARIN SODIUM 5000 UNITS: 5000 INJECTION INTRAVENOUS; SUBCUTANEOUS at 21:49

## 2019-02-28 RX ADMIN — PANTOPRAZOLE SODIUM 40 MG: 40 TABLET, DELAYED RELEASE ORAL at 08:15

## 2019-02-28 RX ADMIN — POLYETHYLENE GLYCOL 3350 17 G: 17 POWDER, FOR SOLUTION ORAL at 10:05

## 2019-02-28 RX ADMIN — Medication 10 ML: at 05:46

## 2019-02-28 RX ADMIN — DIPHENHYDRAMINE HYDROCHLORIDE 12.5 MG: 50 INJECTION, SOLUTION INTRAMUSCULAR; INTRAVENOUS at 12:05

## 2019-02-28 RX ADMIN — HYDROCODONE BITARTRATE AND ACETAMINOPHEN 1 TABLET: 10; 325 TABLET ORAL at 20:17

## 2019-02-28 RX ADMIN — FOLIC ACID 1 MG: 1 TABLET ORAL at 10:04

## 2019-02-28 RX ADMIN — HYDROMORPHONE HYDROCHLORIDE 1 MG: 1 INJECTION, SOLUTION INTRAMUSCULAR; INTRAVENOUS; SUBCUTANEOUS at 21:49

## 2019-02-28 RX ADMIN — HYDROMORPHONE HYDROCHLORIDE 1 MG: 1 INJECTION, SOLUTION INTRAMUSCULAR; INTRAVENOUS; SUBCUTANEOUS at 08:15

## 2019-02-28 RX ADMIN — HYDROMORPHONE HYDROCHLORIDE 1 MG: 1 INJECTION, SOLUTION INTRAMUSCULAR; INTRAVENOUS; SUBCUTANEOUS at 02:15

## 2019-02-28 RX ADMIN — Medication 10 ML: at 21:50

## 2019-02-28 RX ADMIN — ONDANSETRON 2 MG: 2 INJECTION INTRAMUSCULAR; INTRAVENOUS at 12:05

## 2019-02-28 RX ADMIN — HYDROCODONE BITARTRATE AND ACETAMINOPHEN 1 TABLET: 10; 325 TABLET ORAL at 12:05

## 2019-02-28 RX ADMIN — HEPARIN SODIUM 5000 UNITS: 5000 INJECTION INTRAVENOUS; SUBCUTANEOUS at 15:09

## 2019-02-28 RX ADMIN — Medication 1 CAPSULE: at 10:04

## 2019-02-28 RX ADMIN — HYDROCODONE BITARTRATE AND ACETAMINOPHEN 1 TABLET: 10; 325 TABLET ORAL at 06:30

## 2019-02-28 RX ADMIN — DIPHENHYDRAMINE HYDROCHLORIDE 12.5 MG: 50 INJECTION, SOLUTION INTRAMUSCULAR; INTRAVENOUS at 20:17

## 2019-02-28 RX ADMIN — Medication 10 ML: at 20:23

## 2019-02-28 RX ADMIN — ONDANSETRON 2 MG: 2 INJECTION INTRAMUSCULAR; INTRAVENOUS at 20:23

## 2019-02-28 RX ADMIN — HEPARIN SODIUM 5000 UNITS: 5000 INJECTION INTRAVENOUS; SUBCUTANEOUS at 05:46

## 2019-02-28 RX ADMIN — HYDROMORPHONE HYDROCHLORIDE 1 MG: 1 INJECTION, SOLUTION INTRAMUSCULAR; INTRAVENOUS; SUBCUTANEOUS at 14:59

## 2019-02-28 RX ADMIN — ONDANSETRON 2 MG: 2 INJECTION INTRAMUSCULAR; INTRAVENOUS at 06:38

## 2019-02-28 NOTE — PROGRESS NOTES
General Daily Progress Note Admit Date: 2/10/2019 Subjective:  
 
Patient continues to complain of pain but improving. Current Facility-Administered Medications Medication Dose Route Frequency  HYDROcodone-acetaminophen (NORCO)  mg tablet 1 Tab  1 Tab Oral Q8H PRN  
 HYDROmorphone (PF) (DILAUDID) injection 1 mg  1 mg IntraVENous Q6H PRN  
 lactobac ac& pc-s.therm-b.anim (CARLITOS Q/RISAQUAD)  1 Cap Oral DAILY  acetaminophen (TYLENOL) tablet 650 mg  650 mg Oral Q4H PRN  
 losartan (COZAAR) tablet 50 mg  50 mg Oral DAILY  dextrose 5% - 0.45% NaCl with KCl 20 mEq/L infusion  25 mL/hr IntraVENous CONTINUOUS  
 citalopram (CELEXA) tablet 10 mg  10 mg Oral DAILY  diphenhydrAMINE (BENADRYL) capsule 50 mg  50 mg Oral DAILY PRN  
 fentaNYL (DURAGESIC) 75 mcg/hr patch 1 Patch  1 Patch TransDERmal Q72H  folic acid (FOLVITE) tablet 1 mg  1 mg Oral DAILY  sodium chloride (NS) flush 5-40 mL  5-40 mL IntraVENous Q8H  
 sodium chloride (NS) flush 5-40 mL  5-40 mL IntraVENous PRN  
 naloxone (NARCAN) injection 0.4 mg  0.4 mg IntraVENous PRN  
 ondansetron (ZOFRAN) injection 2 mg  2 mg IntraVENous Q6H PRN  
 bisacodyl (DULCOLAX) tablet 5 mg  5 mg Oral DAILY PRN  
 nicotine (NICODERM CQ) 7 mg/24 hr patch 1 Patch  1 Patch TransDERmal DAILY PRN  
 heparin (porcine) injection 5,000 Units  5,000 Units SubCUTAneous Q8H  
 diphenhydrAMINE (BENADRYL) injection 12.5 mg  12.5 mg IntraVENous Q6H PRN  polyethylene glycol (MIRALAX) packet 17 g  17 g Oral DAILY  pantoprazole (PROTONIX) tablet 40 mg  40 mg Oral ACB Review of Systems A comprehensive review of systems was negative. Objective:  
 
Patient Vitals for the past 24 hrs: 
 BP Temp Pulse Resp SpO2  
02/28/19 0802 138/87 98.4 °F (36.9 °C) 77 16 96 % 02/28/19 0400 120/72 98.9 °F (37.2 °C) 80 18 98 % 02/28/19 0000 115/70 98.9 °F (37.2 °C) 80 18 97 % 02/27/19 1945 117/69 99.4 °F (37.4 °C) 82 18 97 % 02/27/19 1615 111/65 98.2 °F (36.8 °C) 88 18 94 % 02/27/19 1114 107/71 98.8 °F (37.1 °C) 78 16 91 % No intake/output data recorded. 02/26 1901 - 02/28 0700 In: 8298 [P.O.:2100; I.V.:1205] Out: - Physical Exam:  
Visit Vitals /87 (BP 1 Location: Left arm, BP Patient Position: At rest) Pulse 77 Temp 98.4 °F (36.9 °C) Resp 16 Ht 5' 10\" (1.778 m) Wt 177 lb 11.1 oz (80.6 kg) SpO2 96% Breastfeeding? No  
BMI 25.50 kg/m² General appearance: alert, cooperative, no distress, appears stated age Neck: supple, symmetrical, trachea midline, no adenopathy, thyroid: not enlarged, symmetric, no tenderness/mass/nodules, no carotid bruit and no JVD Lungs: clear to auscultation bilaterally Heart: regular rate and rhythm, S1, S2 normal, no murmur, click, rub or gallop Abdomen: soft, non-tender. Bowel sounds normal. No masses,  no organomegaly Extremities: extremities normal, atraumatic, no cyanosis or edema Data Review No results found for this or any previous visit (from the past 24 hour(s)). Assessment:  
 
Principal Problem: 
  Sickle cell disease ([facility] (2/10/2019) Active Problems: 
  [facility]ick[facility] ([facility] (10/3/2014) Hypertension (10/3/2014) Hepatitis C (10/3/2014) Depression (10/5/2014) Sickle cell pain crisis ([facility] (9/8/2018) Plan: 1. Continue to reduce analgesic dosage. Improvement continues.

## 2019-03-01 LAB
ANION GAP SERPL CALC-SCNC: 5 MMOL/L (ref 5–15)
BASOPHILS # BLD: 0 K/UL (ref 0–0.1)
BASOPHILS NFR BLD: 1 % (ref 0–1)
BUN SERPL-MCNC: 9 MG/DL (ref 6–20)
BUN/CREAT SERPL: 13 (ref 12–20)
CALCIUM SERPL-MCNC: 8.8 MG/DL (ref 8.5–10.1)
CHLORIDE SERPL-SCNC: 103 MMOL/L (ref 97–108)
CO2 SERPL-SCNC: 30 MMOL/L (ref 21–32)
CREAT SERPL-MCNC: 0.7 MG/DL (ref 0.55–1.02)
DIFFERENTIAL METHOD BLD: ABNORMAL
EOSINOPHIL # BLD: 0.5 K/UL (ref 0–0.4)
EOSINOPHIL NFR BLD: 6 % (ref 0–7)
ERYTHROCYTE [DISTWIDTH] IN BLOOD BY AUTOMATED COUNT: 16.9 % (ref 11.5–14.5)
GLUCOSE SERPL-MCNC: 81 MG/DL (ref 65–100)
HCT VFR BLD AUTO: 25.4 % (ref 35–47)
HGB BLD-MCNC: 8.5 G/DL (ref 11.5–16)
IMM GRANULOCYTES # BLD AUTO: 0 K/UL (ref 0–0.04)
IMM GRANULOCYTES NFR BLD AUTO: 0 % (ref 0–0.5)
LYMPHOCYTES # BLD: 3.4 K/UL (ref 0.8–3.5)
LYMPHOCYTES NFR BLD: 44 % (ref 12–49)
MCH RBC QN AUTO: 30.4 PG (ref 26–34)
MCHC RBC AUTO-ENTMCNC: 33.5 G/DL (ref 30–36.5)
MCV RBC AUTO: 90.7 FL (ref 80–99)
MONOCYTES # BLD: 1.5 K/UL (ref 0–1)
MONOCYTES NFR BLD: 19 % (ref 5–13)
NEUTS SEG # BLD: 2.3 K/UL (ref 1.8–8)
NEUTS SEG NFR BLD: 30 % (ref 32–75)
NRBC # BLD: 0 K/UL (ref 0–0.01)
NRBC BLD-RTO: 0 PER 100 WBC
PLATELET # BLD AUTO: 307 K/UL (ref 150–400)
PMV BLD AUTO: 11.4 FL (ref 8.9–12.9)
POTASSIUM SERPL-SCNC: 4.5 MMOL/L (ref 3.5–5.1)
RBC # BLD AUTO: 2.8 M/UL (ref 3.8–5.2)
SODIUM SERPL-SCNC: 138 MMOL/L (ref 136–145)
WBC # BLD AUTO: 7.8 K/UL (ref 3.6–11)

## 2019-03-01 PROCEDURE — 74011250637 HC RX REV CODE- 250/637: Performed by: INTERNAL MEDICINE

## 2019-03-01 PROCEDURE — 36415 COLL VENOUS BLD VENIPUNCTURE: CPT

## 2019-03-01 PROCEDURE — 85025 COMPLETE CBC W/AUTO DIFF WBC: CPT

## 2019-03-01 PROCEDURE — 65660000000 HC RM CCU STEPDOWN

## 2019-03-01 PROCEDURE — 80048 BASIC METABOLIC PNL TOTAL CA: CPT

## 2019-03-01 PROCEDURE — 74011250636 HC RX REV CODE- 250/636: Performed by: INTERNAL MEDICINE

## 2019-03-01 RX ORDER — HYDROCODONE BITARTRATE AND ACETAMINOPHEN 10; 325 MG/1; MG/1
1 TABLET ORAL
Status: DISPENSED | OUTPATIENT
Start: 2019-03-01 | End: 2019-03-01

## 2019-03-01 RX ORDER — DEXTROSE, SODIUM CHLORIDE, AND POTASSIUM CHLORIDE 5; .45; .15 G/100ML; G/100ML; G/100ML
25 INJECTION INTRAVENOUS CONTINUOUS
Status: ACTIVE | OUTPATIENT
Start: 2019-03-01 | End: 2019-03-01

## 2019-03-01 RX ADMIN — Medication 10 ML: at 14:50

## 2019-03-01 RX ADMIN — FOLIC ACID 1 MG: 1 TABLET ORAL at 08:17

## 2019-03-01 RX ADMIN — DEXTROSE MONOHYDRATE, SODIUM CHLORIDE, AND POTASSIUM CHLORIDE 25 ML/HR: 50; 4.5; 1.49 INJECTION, SOLUTION INTRAVENOUS at 02:16

## 2019-03-01 RX ADMIN — DIPHENHYDRAMINE HYDROCHLORIDE 12.5 MG: 50 INJECTION, SOLUTION INTRAMUSCULAR; INTRAVENOUS at 22:09

## 2019-03-01 RX ADMIN — HEPARIN SODIUM 5000 UNITS: 5000 INJECTION INTRAVENOUS; SUBCUTANEOUS at 14:49

## 2019-03-01 RX ADMIN — ONDANSETRON 2 MG: 2 INJECTION INTRAMUSCULAR; INTRAVENOUS at 05:50

## 2019-03-01 RX ADMIN — DIPHENHYDRAMINE HYDROCHLORIDE 12.5 MG: 50 INJECTION, SOLUTION INTRAMUSCULAR; INTRAVENOUS at 16:28

## 2019-03-01 RX ADMIN — ONDANSETRON 2 MG: 2 INJECTION INTRAMUSCULAR; INTRAVENOUS at 16:28

## 2019-03-01 RX ADMIN — Medication 1 CAPSULE: at 08:17

## 2019-03-01 RX ADMIN — Medication 10 ML: at 04:04

## 2019-03-01 RX ADMIN — DIPHENHYDRAMINE HYDROCHLORIDE 12.5 MG: 50 INJECTION, SOLUTION INTRAMUSCULAR; INTRAVENOUS at 05:49

## 2019-03-01 RX ADMIN — HYDROMORPHONE HYDROCHLORIDE 1 MG: 1 INJECTION, SOLUTION INTRAMUSCULAR; INTRAVENOUS; SUBCUTANEOUS at 04:01

## 2019-03-01 RX ADMIN — HEPARIN SODIUM 5000 UNITS: 5000 INJECTION INTRAVENOUS; SUBCUTANEOUS at 07:07

## 2019-03-01 RX ADMIN — HEPARIN SODIUM 5000 UNITS: 5000 INJECTION INTRAVENOUS; SUBCUTANEOUS at 22:09

## 2019-03-01 RX ADMIN — CITALOPRAM HYDROBROMIDE 10 MG: 20 TABLET ORAL at 08:17

## 2019-03-01 RX ADMIN — DIPHENHYDRAMINE HYDROCHLORIDE 12.5 MG: 50 INJECTION, SOLUTION INTRAMUSCULAR; INTRAVENOUS at 10:31

## 2019-03-01 RX ADMIN — POTASSIUM CHLORIDE, DEXTROSE MONOHYDRATE AND SODIUM CHLORIDE 25 ML/HR: 150; 5; 450 INJECTION, SOLUTION INTRAVENOUS at 10:32

## 2019-03-01 RX ADMIN — HYDROCODONE BITARTRATE AND ACETAMINOPHEN 1 TABLET: 10; 325 TABLET ORAL at 14:48

## 2019-03-01 RX ADMIN — Medication 10 ML: at 05:50

## 2019-03-01 RX ADMIN — LOSARTAN POTASSIUM 50 MG: 50 TABLET, FILM COATED ORAL at 08:17

## 2019-03-01 RX ADMIN — HYDROCODONE BITARTRATE AND ACETAMINOPHEN 1 TABLET: 10; 325 TABLET ORAL at 05:51

## 2019-03-01 RX ADMIN — HYDROMORPHONE HYDROCHLORIDE 1 MG: 1 INJECTION, SOLUTION INTRAMUSCULAR; INTRAVENOUS; SUBCUTANEOUS at 22:09

## 2019-03-01 RX ADMIN — HYDROMORPHONE HYDROCHLORIDE 1 MG: 1 INJECTION, SOLUTION INTRAMUSCULAR; INTRAVENOUS; SUBCUTANEOUS at 16:29

## 2019-03-01 RX ADMIN — ONDANSETRON 2 MG: 2 INJECTION INTRAMUSCULAR; INTRAVENOUS at 10:31

## 2019-03-01 RX ADMIN — Medication 40 ML: at 02:43

## 2019-03-01 RX ADMIN — POLYETHYLENE GLYCOL 3350 17 G: 17 POWDER, FOR SOLUTION ORAL at 08:17

## 2019-03-01 RX ADMIN — ONDANSETRON 2 MG: 2 INJECTION INTRAMUSCULAR; INTRAVENOUS at 22:09

## 2019-03-01 RX ADMIN — HYDROMORPHONE HYDROCHLORIDE 1 MG: 1 INJECTION, SOLUTION INTRAMUSCULAR; INTRAVENOUS; SUBCUTANEOUS at 10:31

## 2019-03-01 RX ADMIN — PANTOPRAZOLE SODIUM 40 MG: 40 TABLET, DELAYED RELEASE ORAL at 07:07

## 2019-03-01 NOTE — PROGRESS NOTES
Bedside and Verbal shift change report given to SAMIR Fernandez (oncoming nurse) by Schuyler Sumner RN (offgoing nurse). Report included the following information SBAR, Kardex and MAR.

## 2019-03-01 NOTE — PROGRESS NOTES
Nutrition Assessment: 
 
INTERVENTIONS/RECOMMENDATIONS:  
Continue current diet ASSESSMENT:  
Chart reviewed. Pt continues to eating well. Labs WNL. No nutrition concerns at this time. Diet Order: Regular 
% Eaten:  No data found. Pertinent Medications: [x]Reviewed: D5 1/2 NS, folic acid, lactobac, zofran, PPI, miralax, Pertinent Labs: [x]Reviewed:  
Food Allergies: [x]NKFA  []Other Last BM:  2/28 Edema: n/a     []RUE   []LUE   []RLE   []LLE Pressure Ulcer: n/a     [] Stage I   [] Stage II   [] Stage III   [] Stage IV Anthropometrics: Height: 5' 10\" (177.8 cm) Weight: 80.6 kg (177 lb 11.1 oz) IBW (%IBW):   ( ) UBW (%UBW):   (  %) BMI: Body mass index is 25.5 kg/m². This BMI is indicative of: 
[]Underweight   []Normal   [x]Overweight   [] Obesity   [] Extreme Obesity (BMI>40) Last Weight Metrics: 
Weight Loss Metrics 2/16/2019 2/7/2019 1/19/2019 1/10/2019 12/12/2018 11/13/2018 10/29/2018 Today's Wt 177 lb 11.1 oz 181 lb 4.8 oz 175 lb 14.8 oz 182 lb 12.8 oz 169 lb 12.1 oz 197 lb 1.6 oz 170 lb 10.2 oz  
BMI 25.5 kg/m2 26.01 kg/m2 25.24 kg/m2 25.5 kg/m2 23.68 kg/m2 27.49 kg/m2 23.8 kg/m2 Estimated Nutrition Needs (Based on): 1921 Kcals/day(BMR (31-70-28-28) x 1. 3AF) , 81 g(1.0 g/kg bw) Protein Carbohydrate: At Least 130 g/day  Fluids: 1921 mL/day or per primary team 
 
NUTRITION DIAGNOSES:  
Problem:  No nutritional diagnosis at this time Etiology: related to Signs/Symptoms: as evidenced by     
Previous Nutrition Dx:  [] Resolved  [] Unresolved           [] Progressing NUTRITION INTERVENTIONS: 
Meals/Snacks: General/healthful diet GOAL:  
Pt will consume >70% of meals in 5-7 days. NUTRITION MONITORING AND EVALUATION Food/Nutrient Intake Outcomes: Total energy intake Physical Signs/Symptoms Outcomes: Electrolyte and renal profile, GI profile, Weight/weight change, Glucose profile, GI 
 
Previous Goal Met: 
 [x] Met              [] Progressing Towards Goal              [] Not Progressing Towards Goal  
Previous Recommendations: 
 [] Implemented          [] Not Implemented          [x] Not Applicable LEARNING NEEDS (Diet, Food/Nutrient-Drug Interaction):  
 [x] None Identified 
 [] Identified and Education Provided/Documented 
 [] Identified and Pt declined/was not appropriate Cultural, Lutheran, OR Ethnic Dietary Needs:  
 [x] None Identified 
 [] Identified and Addressed 
 
 [x] Interdisciplinary Care Plan Reviewed/Documented  
 [x] Discharge Planning: General healthy diet 
 [] Participated in Interdisciplinary Rounds NUTRITION RISK:  
 [] High              [] Moderate           []  Low  [x]  Minimal/Uncompromised Conrad Paige RDN Pager [phone] Weekend Pager [phone]

## 2019-03-01 NOTE — PROGRESS NOTES
General Daily Progress Note Admit Date: 2/10/2019 Subjective:  
 
Patient has pain but continues to improve. Current Facility-Administered Medications Medication Dose Route Frequency  dextrose 5% - 0.45% NaCl with KCl 20 mEq/L infusion  25 mL/hr IntraVENous CONTINUOUS  
 HYDROcodone-acetaminophen (NORCO)  mg tablet 1 Tab  1 Tab Oral Q8H PRN  
 HYDROmorphone (PF) (DILAUDID) injection 1 mg  1 mg IntraVENous Q6H PRN  
 lactobac ac& pc-s.therm-b.anim (CARLITOS Q/RISAQUAD)  1 Cap Oral DAILY  acetaminophen (TYLENOL) tablet 650 mg  650 mg Oral Q4H PRN  
 losartan (COZAAR) tablet 50 mg  50 mg Oral DAILY  citalopram (CELEXA) tablet 10 mg  10 mg Oral DAILY  diphenhydrAMINE (BENADRYL) capsule 50 mg  50 mg Oral DAILY PRN  
 fentaNYL (DURAGESIC) 75 mcg/hr patch 1 Patch  1 Patch TransDERmal Q72H  folic acid (FOLVITE) tablet 1 mg  1 mg Oral DAILY  sodium chloride (NS) flush 5-40 mL  5-40 mL IntraVENous Q8H  
 sodium chloride (NS) flush 5-40 mL  5-40 mL IntraVENous PRN  
 naloxone (NARCAN) injection 0.4 mg  0.4 mg IntraVENous PRN  
 ondansetron (ZOFRAN) injection 2 mg  2 mg IntraVENous Q6H PRN  
 bisacodyl (DULCOLAX) tablet 5 mg  5 mg Oral DAILY PRN  
 nicotine (NICODERM CQ) 7 mg/24 hr patch 1 Patch  1 Patch TransDERmal DAILY PRN  
 heparin (porcine) injection 5,000 Units  5,000 Units SubCUTAneous Q8H  
 diphenhydrAMINE (BENADRYL) injection 12.5 mg  12.5 mg IntraVENous Q6H PRN  polyethylene glycol (MIRALAX) packet 17 g  17 g Oral DAILY  pantoprazole (PROTONIX) tablet 40 mg  40 mg Oral ACB Review of Systems A comprehensive review of systems was negative. Objective:  
 
Patient Vitals for the past 24 hrs: 
 BP Temp Pulse Resp SpO2  
03/01/19 0806 121/70 98.6 °F (37 °C) 66 16 98 % 03/01/19 0403 120/65 98.7 °F (37.1 °C) 76 18 98 % 02/28/19 2016 116/68 99.1 °F (37.3 °C) 85 18 100 % 02/28/19 1709 125/68 98.7 °F (37.1 °C) 76 17 100 % 02/28/19 1229 125/64 98.5 °F (36.9 °C) 78 17 99 % No intake/output data recorded. 02/27 1901 - 03/01 0700 In: 1800 [P.O.:1800] Out: - Physical Exam:  
Visit Vitals /70 Pulse 66 Temp 98.6 °F (37 °C) Resp 16 Ht 5' 10\" (1.778 m) Wt 177 lb 11.1 oz (80.6 kg) SpO2 98% Breastfeeding? No  
BMI 25.50 kg/m² General appearance: alert, cooperative, no distress, appears stated age Neck: supple, symmetrical, trachea midline, no adenopathy, thyroid: not enlarged, symmetric, no tenderness/mass/nodules, no carotid bruit and no JVD Lungs: clear to auscultation bilaterally Heart: regular rate and rhythm, S1, S2 normal, no murmur, click, rub or gallop Abdomen: soft, non-tender. Bowel sounds normal. No masses,  no organomegaly Extremities: extremities normal, atraumatic, no cyanosis or edema Data Review Recent Results (from the past 24 hour(s)) METABOLIC PANEL, BASIC Collection Time: 03/01/19  2:50 AM  
Result Value Ref Range Sodium 138 136 - 145 mmol/L Potassium 4.5 3.5 - 5.1 mmol/L Chloride 103 97 - 108 mmol/L  
 CO2 30 21 - 32 mmol/L Anion gap 5 5 - 15 mmol/L Glucose 81 65 - 100 mg/dL BUN 9 6 - 20 MG/DL Creatinine 0.70 0.55 - 1.02 MG/DL  
 BUN/Creatinine ratio 13 12 - 20 GFR est AA >60 >60 ml/min/1.73m2 GFR est non-AA >60 >60 ml/min/1.73m2 Calcium 8.8 8.5 - 10.1 MG/DL Assessment:  
 
Principal Problem: 
  Sickle cell disease ([facility] (2/10/2019) Active Problems: 
  Sickle cell anemia ([facility] (10/3/2014) Hypertension (10/3/2014) Hepatitis C (10/3/2014) Depression (10/5/2014) Sickle cell pain crisis ([facility] (9/8/2018) Plan: 1. Plan discharge tomorrow if all goes well. Pain continues to improve. 2.  No further fever spikes. Patient has been off antibiotics for the last 4 days.

## 2019-03-02 VITALS
TEMPERATURE: 98.9 F | OXYGEN SATURATION: 98 % | WEIGHT: 177.69 LBS | DIASTOLIC BLOOD PRESSURE: 65 MMHG | HEIGHT: 70 IN | BODY MASS INDEX: 25.44 KG/M2 | HEART RATE: 72 BPM | RESPIRATION RATE: 16 BRPM | SYSTOLIC BLOOD PRESSURE: 113 MMHG

## 2019-03-02 PROCEDURE — 77010033678 HC OXYGEN DAILY

## 2019-03-02 PROCEDURE — 74011250637 HC RX REV CODE- 250/637: Performed by: INTERNAL MEDICINE

## 2019-03-02 PROCEDURE — 74011250636 HC RX REV CODE- 250/636: Performed by: INTERNAL MEDICINE

## 2019-03-02 PROCEDURE — 94760 N-INVAS EAR/PLS OXIMETRY 1: CPT

## 2019-03-02 RX ADMIN — HEPARIN SODIUM 5000 UNITS: 5000 INJECTION INTRAVENOUS; SUBCUTANEOUS at 07:05

## 2019-03-02 RX ADMIN — DIPHENHYDRAMINE HYDROCHLORIDE 12.5 MG: 50 INJECTION, SOLUTION INTRAMUSCULAR; INTRAVENOUS at 09:45

## 2019-03-02 RX ADMIN — CITALOPRAM HYDROBROMIDE 10 MG: 20 TABLET ORAL at 09:45

## 2019-03-02 RX ADMIN — Medication 10 ML: at 07:06

## 2019-03-02 RX ADMIN — HYDROMORPHONE HYDROCHLORIDE 1 MG: 1 INJECTION, SOLUTION INTRAMUSCULAR; INTRAVENOUS; SUBCUTANEOUS at 09:45

## 2019-03-02 RX ADMIN — POLYETHYLENE GLYCOL 3350 17 G: 17 POWDER, FOR SOLUTION ORAL at 09:45

## 2019-03-02 RX ADMIN — LOSARTAN POTASSIUM 50 MG: 50 TABLET, FILM COATED ORAL at 09:46

## 2019-03-02 RX ADMIN — HYDROMORPHONE HYDROCHLORIDE 1 MG: 1 INJECTION, SOLUTION INTRAMUSCULAR; INTRAVENOUS; SUBCUTANEOUS at 04:00

## 2019-03-02 RX ADMIN — FOLIC ACID 1 MG: 1 TABLET ORAL at 09:45

## 2019-03-02 RX ADMIN — PANTOPRAZOLE SODIUM 40 MG: 40 TABLET, DELAYED RELEASE ORAL at 09:45

## 2019-03-02 RX ADMIN — DIPHENHYDRAMINE HYDROCHLORIDE 12.5 MG: 50 INJECTION, SOLUTION INTRAMUSCULAR; INTRAVENOUS at 04:00

## 2019-03-02 RX ADMIN — ONDANSETRON 2 MG: 2 INJECTION INTRAMUSCULAR; INTRAVENOUS at 04:00

## 2019-03-02 RX ADMIN — ONDANSETRON 2 MG: 2 INJECTION INTRAMUSCULAR; INTRAVENOUS at 09:45

## 2019-03-02 RX ADMIN — Medication 1 CAPSULE: at 09:45

## 2019-03-02 NOTE — PROGRESS NOTES
Weekend CM reviewed medical record, followed up re: scheduled d/c today. Patient medically cleared for discharge, transitional care plan remains to return home, no additional discharge recommendations reported/identified at this time. CM remains available to further assist w/ any additional case management needs as indicated. Chaz Marie, LCSW Lore Miramontes

## 2019-03-02 NOTE — DISCHARGE INSTRUCTIONS
Patient Discharge Instructions    Eric Moore / [id number] : 1960    Admitted 2/10/2019 Discharged: 3/2/2019     Patient Active Problem List   Diagnosis Code    Sickle cell anemia (HCC) D57.1    Hypertension I10    Venous insufficiency I87.2    Hepatitis C B19.20    Depression F32.9    Sickle cell crisis (HCC) D57.00    Pulsatile tinnitus H93. A9    Carpal tunnel syndrome of right wrist G56.01    Sickle cell pain crisis ([facility] [address] D57.00    Overweight (BMI 25.0-29. 9) E66.3    Sickle cell disease ([facility] [address] D57.1       Take Home Medications            · It is important that you take the medication exactly as they are prescribed. · Keep your medication in the bottles provided by the pharmacist and keep a list of the medication names, dosages, and times to be taken in your wallet. · Do not take other medications without consulting your doctor. What to do at Home    Recommended diet: Regular Diet,   Recommended activity: Activity as tolerated,     Follow-up with KAREL SOUTH MD  in 3 day        Information obtained by :  I understand that if any problems occur once I am at home I am to contact my physician. I understand and acknowledge receipt of the instructions indicated above.                                                                                                                                            Physician's or R.N.'s Signature                                                                  Date/Time                                                                                                                                              Patient or Representative Signature                                                          Date/Time

## 2019-03-02 NOTE — PROGRESS NOTES
Central line from right side of neck removed at 1320. Sterile technique maintained. Gently pressure held for 15 minutes. No bleeding noted. Sterile dressing applied. Patient cooperative with laying semi flat for 1 hour.

## 2019-03-02 NOTE — PROGRESS NOTES
General Daily Progress Note Admit Date: 2/10/2019 Subjective:  
 
Patient has pain but continues to improve. Current Facility-Administered Medications Medication Dose Route Frequency  HYDROmorphone (PF) (DILAUDID) injection 1 mg  1 mg IntraVENous Q6H PRN  
 lactobac ac& pc-s.therm-b.anim (CARLITOS Q/RISAQUAD)  1 Cap Oral DAILY  acetaminophen (TYLENOL) tablet 650 mg  650 mg Oral Q4H PRN  
 losartan (COZAAR) tablet 50 mg  50 mg Oral DAILY  citalopram (CELEXA) tablet 10 mg  10 mg Oral DAILY  diphenhydrAMINE (BENADRYL) capsule 50 mg  50 mg Oral DAILY PRN  
 fentaNYL (DURAGESIC) 75 mcg/hr patch 1 Patch  1 Patch TransDERmal Q72H  folic acid (FOLVITE) tablet 1 mg  1 mg Oral DAILY  sodium chloride (NS) flush 5-40 mL  5-40 mL IntraVENous Q8H  
 sodium chloride (NS) flush 5-40 mL  5-40 mL IntraVENous PRN  
 naloxone (NARCAN) injection 0.4 mg  0.4 mg IntraVENous PRN  
 ondansetron (ZOFRAN) injection 2 mg  2 mg IntraVENous Q6H PRN  
 bisacodyl (DULCOLAX) tablet 5 mg  5 mg Oral DAILY PRN  
 nicotine (NICODERM CQ) 7 mg/24 hr patch 1 Patch  1 Patch TransDERmal DAILY PRN  
 heparin (porcine) injection 5,000 Units  5,000 Units SubCUTAneous Q8H  
 diphenhydrAMINE (BENADRYL) injection 12.5 mg  12.5 mg IntraVENous Q6H PRN  polyethylene glycol (MIRALAX) packet 17 g  17 g Oral DAILY  pantoprazole (PROTONIX) tablet 40 mg  40 mg Oral ACB Review of Systems A comprehensive review of systems was negative. Objective:  
 
Patient Vitals for the past 24 hrs: 
 BP Temp Pulse Resp SpO2  
03/02/19 0400 114/75 98.7 °F (37.1 °C) (!) 109 16 94 % 03/02/19 0012 113/63 98.1 °F (36.7 °C) 70 16 98 % 03/01/19 1721 107/63 98.5 °F (36.9 °C) 78 16 97 % 03/01/19 1143 136/66 98.9 °F (37.2 °C) 78 16 98 % No intake/output data recorded. 02/28 1901 - 03/02 0700 In: 600 [P.O.:600] Out: - Physical Exam:  
Visit Vitals /75 (BP 1 Location: Right arm, BP Patient Position: At rest) Pulse (!) 109 Temp 98.7 °F (37.1 °C) Resp 16 Ht 5' 10\" (1.778 m) Wt 177 lb 11.1 oz (80.6 kg) SpO2 94% Breastfeeding? No  
BMI 25.50 kg/m² General appearance: alert, cooperative, no distress, appears stated age Neck: supple, symmetrical, trachea midline, no adenopathy, thyroid: not enlarged, symmetric, no tenderness/mass/nodules, no carotid bruit and no JVD Lungs: clear to auscultation bilaterally Heart: regular rate and rhythm, S1, S2 normal, no murmur, click, rub or gallop Abdomen: soft, non-tender. Bowel sounds normal. No masses,  no organomegaly Extremities: extremities normal, atraumatic, no cyanosis or edema Data Review Recent Results (from the past 24 hour(s)) CBC WITH AUTOMATED DIFF Collection Time: 03/01/19 10:42 AM  
Result Value Ref Range WBC 7.8 3.6 - 11.0 K/uL  
 RBC 2.80 (L) 3.80 - 5.20 M/uL HGB 8.5 (L) 11.5 - 16.0 g/dL HCT 25.4 (L) 35.0 - 47.0 % MCV 90.7 80.0 - 99.0 FL  
 MCH 30.4 26.0 - 34.0 PG  
 MCHC 33.5 30.0 - 36.5 g/dL  
 RDW 16.9 (H) 11.5 - 14.5 % PLATELET 379 388 - 678 K/uL MPV 11.4 8.9 - 12.9 FL  
 NRBC 0.0 0  WBC ABSOLUTE NRBC 0.00 0.00 - 0.01 K/uL NEUTROPHILS 30 (L) 32 - 75 % LYMPHOCYTES 44 12 - 49 % MONOCYTES 19 (H) 5 - 13 % EOSINOPHILS 6 0 - 7 % BASOPHILS 1 0 - 1 % IMMATURE GRANULOCYTES 0 0.0 - 0.5 % ABS. NEUTROPHILS 2.3 1.8 - 8.0 K/UL  
 ABS. LYMPHOCYTES 3.4 0.8 - 3.5 K/UL  
 ABS. MONOCYTES 1.5 (H) 0.0 - 1.0 K/UL  
 ABS. EOSINOPHILS 0.5 (H) 0.0 - 0.4 K/UL  
 ABS. BASOPHILS 0.0 0.0 - 0.1 K/UL  
 ABS. IMM. GRANS. 0.0 0.00 - 0.04 K/UL  
 DF AUTOMATED Assessment:  
 
Principal Problem: 
  Sickle cell disease ([facility] (2/10/2019) Active Problems: 
  Sickle cell anemia ([facility] (10/3/2014) Hypertension (10/3/2014) Hepatitis C (10/3/2014) Depression (10/5/2014) Sickle cell pain crisis ([facility] (9/8/2018) Plan: 1. Plan discharge today

## 2019-03-02 NOTE — PROGRESS NOTES
PCP KRISTINA appt scheduled with  on 3/7/2019 at 1:15pm. Appt added to AVS. LOGAN Winston CM Specialist

## 2019-03-02 NOTE — PROGRESS NOTES
Problem: Falls - Risk of 
Goal: *Absence of Falls Document [place] Falls Officer Fall Risk and appropriate interventions in the flowsheet. Outcome: Resolved/Met Date Met: 03/02/19 Fall Risk Interventions: 
  
 
Mentation Interventions: Update white board Medication Interventions: Evaluate medications/consider consulting pharmacy, Patient to call before getting OOB, Teach patient to arise slowly Elimination Interventions: Call light in reach, Patient to call for help with toileting needs, Toilet paper/wipes in reach, Toileting schedule/hourly rounds History of Falls Interventions: Consult care management for discharge planning, Investigate reason for fall, Room close to nurse's station, Door open when patient unattended

## 2019-03-02 NOTE — PROGRESS NOTES
Discharge instructions reviewed with the patient. The patient able to verbalize an understanding. All personal belongings with the patient. The patient left via wheelchair. Dressing to right side of neck dry and  Intact. Patient instructed to take dressing off tomorrow night at 10 pm or Monday morning. Patient instructed to report any drainage ,redness or swelling from the site to report to an ER , patient voiced an understanding.

## 2019-03-03 NOTE — DISCHARGE SUMMARY
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 This was unresponsive for p.o. analgesics. In view of the recalcitrant pain, it was elected to admit her for further evaluation and care. Past medical history, social history, review of systems, family history, physical examination are as in admitting H and P.    LABORATORY DATA:  Lab values, initial hemoglobin was 9.8, white count 5.1, MCV was 91.1, platelet count was 061,607 with the following differential, 41 segs, 44 lymphocytes, 12 monocytes, 2 eosinophils, 1 basophil. Hemoglobin on admission low at 5.3. At the time of discharge, hemoglobin is 8.5 with white count of 7.8. Abnormalities on the comprehensive profile were limited to potassium of 3.0, bilirubin was 1.5, AST was 43, and alkaline phosphatase 119. At the time of discharge, potassium was 4.5. Blood cultures were all negative. RADIOGRAPHS:  Initial chest x-ray negative. HOSPITAL COURSE:  The patient was admitted and was placed on parenteral fluids, O2 and parenteral analgesics. As usual she is extremely slow to respond to medication. She is reasonably comfortable with analgesics, however, but continues to have pain. Hemoglobin progressively dropped as often times happens. I initially attempted to transfuse 2 units of packed cells but was met with a reaction with manifesting itself as an increase in her temperature to 102 degrees Fahrenheit. This was discarded and I did not transfuse the second unit at that particular time. The patient did develop a low-grade temperature before giving the blood as often times happens with the crisis.   Temperature, however, went a bit higher than usual.  She was cultured and started on parenteral antibiotics, hoping that the central line was not infected. All cultures were negative. She remained on antibiotics for about three to four days and this was discontinued without a recrudescence of her temperature elevation. Finally, the hemoglobin remained down, and I transfused her on another occasion 2 units of packed cells, which she tolerated well with no adverse reactions. At that point, her pain had significantly decreased and subsequently she was discharged. FINAL DIAGNOSES:  1. Sickle cell hemoglobinopathy. 2.  Painful crisis. 3.  Transfusion reaction. 4.  Primary hypertension. 5.  Depression. PROCEDURES AND OPERATIONS DONE:  Procedures done in this admission, transfusion of packed cells. DISPOSITION:  The patient will be discharged home ambulatory, on a regular diet. The patient remains a full code. DISCHARGE MEDICATIONS:  Include the following, Celexa 10 mg daily, Benadryl 50 mg q.4 hours p.r.n., Duragesic patch 75 mcg daily, folic acid 1 mg q.a.m., hydrocodone 10/325 q.6 hours p.r.n., losartan/HCTZ _____/12.5 one q.a.m., and Zofran 4 mg q.8 hours p.r.n. DISCHARGE INSTRUCTIONS:  The patient is to return to my office in the next five days or so.         Roseanne Singer MD      [id number]  D:  03/02/2019 11:06  T:  03/03/2019 8:15  JOB #:  [id number]

## 2019-03-04 ENCOUNTER — PATIENT OUTREACH (OUTPATIENT)
Dept: INTERNAL MEDICINE CLINIC | Age: 59
End: 2019-03-04

## 2019-03-06 ENCOUNTER — PATIENT OUTREACH (OUTPATIENT)
Dept: INTERNAL MEDICINE CLINIC | Age: 59
End: 2019-03-06

## 2019-03-06 NOTE — PROGRESS NOTES
[facility] 2/10-3/2/2019:  Sick Cell Pain Crisis f/u  Hospital Discharge Follow-Up      Date/Time:  3/6/2019 11:02 AM    Patient was admitted to [facility] on 2/10 and discharged on 3/2/2019 for Sickle Cell Crisis/Transfusion Reaction. The physician discharge summary was available at the time of outreach. Patient was contacted within 2 business days of discharge. Top Challenges reviewed with the provider   none         Method of communication with provider :none    Inpatient RRAT score: 24  Was this a readmission? yes --2019:  13 days   Patient stated reason for the readmission: severe pain/sick cell crisis. Nurse Navigator (NN) contacted the patient by telephone to perform post hospital discharge assessment. Verified name and  with patient as identifiers. Provided introduction to self, and explanation of the Nurse Navigator role. Reviewed discharge instructions and red flags with patient who verbalized understanding. Patient given an opportunity to ask questions and does not have any further questions or concerns at this time. The patient agrees to contact the PCP office for questions related to their healthcare. NN provided contact information for future reference. Disease Specific:   none    Summary of patient's top problems:  FINAL DIAGNOSES:  1. Sickle cell hemoglobinopathy. --post Infusion catheter placement. 2.  Painful crisis. 3.  Transfusion reaction. 4.  Primary hypertension. 5.  Depression.   PROCEDURES AND OPERATIONS DONE:  Procedures done in this admission, transfusion of packed cells. Home Health orders at discharge: [address]: n/a  Date of initial visit: [facility] ordered/company: none   Durable Medical Equipment received: n/a    Barriers to care? Depression. Advance Care Planning:   Does patient have an Advance Directive:  not on file; education provided -agreed to get information.       Medication(s): New Medications at Discharge: none  Changed Medications at Discharge: none  Discontinued Medications at Discharge: none    Medication reconciliation was performed with patient, who verbalizes understanding of administration of home medications. There were no barriers to obtaining medications identified at this time. Referral to Pharm D needed: no     Current Outpatient Medications   Medication Sig    HYDROcodone-acetaminophen (NORCO)  mg tablet Take 1 Tab by mouth every six (6) hours as needed. Max Daily Amount: 4 Tabs.  diphenhydrAMINE (BENADRYL) 25 mg tablet Take 50 mg by mouth daily as needed (for itching with hydrocodone).  hydrocortisone (CORTISONE) 1 % topical cream Apply  to affected area two (2) times daily as needed for Skin Irritation. use thin layer    folic acid (FOLVITE) 1 mg tablet Take 1 Tab by mouth daily.  citalopram (CELEXA) 10 mg tablet TAKE 1 TABLET BY MOUTH EVERY NIGHT AT BEDTIME    losartan-hydroCHLOROthiazide (HYZAAR) 50-12.5 mg per tablet TAKE 1 TABLET BY MOUTH DAILY    fentaNYL (DURAGESIC) 75 mcg/hr 1 Patch by TransDERmal route every seventy-two (72) hours. Max Daily Amount: 1 Patch.  ondansetron (ZOFRAN ODT) 4 mg disintegrating tablet Take 1 Tab by mouth every eight (8) hours as needed for Nausea. No current facility-administered medications for this visit. There are no discontinued medications. [facility] follow up appointment(s):   Future Appointments   Date Time Provider Waldo Calvo   3/7/2019  1:15 PM Lawyer Snehal MD [facility]   4/18/2019  1:00 PM Lawyer Snehal MD [facility]      Non-[facility] follow up appointment(s): none  [facility]:  n/a       Goals      Coordinate pain management plan for patient. 3/6/2019:  Patient states pain is worst today since d/c due to coldness outside. Patient agreed to warmer clothing, eating less junk foods (sugary products), and drinking an increased water amount.   States Fentanyl patch is not working as would like but still uses it along with other pain medications.   PCP visit in AM.  EW            Goal completion:  2/18/2019

## 2019-03-06 NOTE — PROGRESS NOTES
[facility] 2/10-3/2/2019:  Sick Cell Pain Crisis  Patient on [place] discharge report dated 3/4/2019. Undersigned left message on voice mail with my contact information for return call. Need to assess for discharge needs.

## 2019-03-07 ENCOUNTER — OFFICE VISIT (OUTPATIENT)
Dept: INTERNAL MEDICINE CLINIC | Age: 59
End: 2019-03-07

## 2019-03-07 VITALS
BODY MASS INDEX: 28.23 KG/M2 | RESPIRATION RATE: 16 BRPM | HEART RATE: 81 BPM | WEIGHT: 197.2 LBS | TEMPERATURE: 99 F | DIASTOLIC BLOOD PRESSURE: 62 MMHG | HEIGHT: 70 IN | OXYGEN SATURATION: 99 % | SYSTOLIC BLOOD PRESSURE: 99 MMHG

## 2019-03-07 DIAGNOSIS — I10 ESSENTIAL HYPERTENSION: ICD-10-CM

## 2019-03-07 DIAGNOSIS — D57.00 SICKLE CELL CRISIS (HCC): Primary | ICD-10-CM

## 2019-03-07 DIAGNOSIS — F32.A DEPRESSION, UNSPECIFIED DEPRESSION TYPE: ICD-10-CM

## 2019-03-07 DIAGNOSIS — E66.3 OVERWEIGHT (BMI 25.0-29.9): ICD-10-CM

## 2019-03-07 NOTE — PROGRESS NOTES
Chief Complaint   Patient presents with   [facility] Follow Up     Patient admitted to [facility] on 2/10/19 for Sickle Cell Crisis. 1. Have you been to the ER, urgent care clinic since your last visit? Hospitalized since your last visit? Yes When: 2/10/19 [facility] [place] Reason for visit: Sickle Cell Crisis    2. Have you seen or consulted any other health care providers outside of the [address] [place] [address] since your last visit? Include any pap smears or colon screening.  No

## 2019-03-08 NOTE — PROGRESS NOTES
[facility]  [facility]  [address] [place] [address]  Phone:  [phone]  Fax: [phone]       Chief Complaint   Patient presents with   [facility] Follow Up     Patient admitted to [facility] on 2/10/19 for Sickle Cell Crisis. .      SUBJECTIVE:    Shaista Zapien is a 66 y.o. female Comes in for return visit for her first visit since having her sickle cell crisis. She remained in the hospital for almost 3-4 weeks. She received 2 units of packed cells. Typically when she has crisis it involves a fever. Since being home she is doing reasonably well, although continues to have chronic pain, requiring analgesics. She is concerned about her pressure in that it might be too low based on her one reading today. Her depression has been reasonably stable. She is accommodating for the fact that her daughter had left the house in view of her most recent marriage. Finally, her weight is reasonable. She has not gained any since I last saw her. She is not as active as I would like her to be. Current Outpatient Medications   Medication Sig Dispense Refill    HYDROcodone-acetaminophen (NORCO)  mg tablet Take 1 Tab by mouth every six (6) hours as needed. Max Daily Amount: 4 Tabs. 120 Tab 0    diphenhydrAMINE (BENADRYL) 25 mg tablet Take 50 mg by mouth daily as needed (for itching with hydrocodone).  hydrocortisone (CORTISONE) 1 % topical cream Apply  to affected area two (2) times daily as needed for Skin Irritation. use thin layer      folic acid (FOLVITE) 1 mg tablet Take 1 Tab by mouth daily. 30 Tab 11    citalopram (CELEXA) 10 mg tablet TAKE 1 TABLET BY MOUTH EVERY NIGHT AT BEDTIME 90 Tab 3    losartan-hydroCHLOROthiazide (HYZAAR) 50-12.5 mg per tablet TAKE 1 TABLET BY MOUTH DAILY 90 Tab 3    fentaNYL (DURAGESIC) 75 mcg/hr 1 Patch by TransDERmal route every seventy-two (72) hours. Max Daily Amount: 1 Patch.  10 Patch 0    ondansetron (ZOFRAN ODT) 4 mg disintegrating tablet Take 1 Tab by mouth every eight (8) hours as needed for Nausea.  10 Tab 0     Past Medical History:   Diagnosis Date    Anemia     SC hemoglobinopathy    Chronic pain     Depression     Hypertension     Liver disease     hepatitis C; pt reports \"cured\"    Sickle cell disease ([facility]      Past Surgical History:   Procedure Laterality Date    BREAST SURGERY PROCEDURE UNLISTED      2 cysts removed from R breast    CHEST SURGERY PROCEDURE UNLISTED      status post thor for removal of a benign     HX BREAST BIOPSY Right 05/2007    negative    HX CHOLECYSTECTOMY      HX ORTHOPAEDIC      bony curettage of an ostial myelitis focus     Allergies   Allergen Reactions    Dilaudid [Hydromorphone (Bulk)] Itching     Can tolerate with benadryl and ondansetron    Percocet [Oxycodone-Acetaminophen] Itching         REVIEW OF SYSTEMS:  General: negative for - chills or fever  ENT: negative for - headaches, nasal congestion or tinnitus  Respiratory: negative for - cough, hemoptysis, shortness of breath or wheezing  Cardiovascular : negative for - chest pain, edema, palpitations or shortness of breath  Gastrointestinal: negative for - abdominal pain, blood in stools, heartburn or nausea/vomiting  Genito-Urinary: no dysuria, trouble voiding, or hematuria  Musculoskeletal: negative for - gait disturbance, joint pain, joint stiffness or joint swelling  Neurological: no TIA or stroke symptoms  Hematologic: no bruises, no bleeding, no swollen glands  Integument: no lumps, mole changes, nail changes or rash  Endocrine: no malaise/lethargy or unexpected weight changes      Social History     Socioeconomic History    Marital status:      Spouse name: Not on file    Number of children: 2    Years of education: Not on file    Highest education level: Not on file   Occupational History    Occupation: disabled---Sickle cell disease   Tobacco Use    Smoking status: Never Smoker    Smokeless tobacco: Never Used   Substance and Sexual Activity    Alcohol use: No    Drug use: No    Sexual activity: Yes     Partners: Male     Family History   Problem Relation Age of Onset    Hypertension Mother     Hypertension Father        OBJECTIVE:    Visit Vitals  BP 99/62   Pulse 81   Temp 99 °F (37.2 °C) (Oral)   Resp 16   Ht 5' 10\" (1.778 m)   Wt 197 lb 3.2 oz (89.4 kg)   SpO2 99%   BMI 28.30 kg/m²     CONSTITUTIONAL: well , well nourished, appears age appropriate  EYES: perrla, eom intact  ENMT:moist mucous membranes, pharynx clear  NECK: supple. Thyroid normal  RESPIRATORY: Chest: clear to ascultation and percussion   CARDIOVASCULAR: Heart: regular rate and rhythm  GASTROINTESTINAL: Abdomen: soft, bowel sounds active  HEMATOLOGIC: no pathological lymph nodes palpated  MUSCULOSKELETAL: Extremities: no edema, pulse 1+   INTEGUMENT: No unusual rashes or suspicious skin lesions noted. Nails appear normal.  NEUROLOGIC: non-focal exam   MENTAL STATUS: alert and oriented, appropriate affect      ASSESSMENT:  1. Sickle cell crisis ([facility]    2. Essential hypertension    3. Overweight (BMI 25.0-29.9)    4. Depression, unspecified depression type        PLAN:    1. She continues analgesics for her sickle cell crisis. Hopefully she can remain out of the hospital for a reasonable period of time. 2. Her blood pressure when I rechecked it was 120/70 and no adjustment need be made. 3. I encouraged her to minimize weight gain. This can be accomplished by eating meals and eliminating snacking, as well as avoiding the consumption of processed carbohydrates. 4. Her depression is better. She will continue her antidepressant as prescribed. 5. I encouraged her to increase her physical activity, walking outside on a consistent basis. Follow-up Disposition:  Return in about 4 weeks (around 4/4/2019).       Jovanny Villeda MD

## 2019-03-26 DIAGNOSIS — D57.00 SICKLE CELL PAIN CRISIS (HCC): ICD-10-CM

## 2019-03-26 DIAGNOSIS — D57.00 SICKLE CELL CRISIS (HCC): ICD-10-CM

## 2019-03-26 RX ORDER — HYDROCODONE BITARTRATE AND ACETAMINOPHEN 10; 325 MG/1; MG/1
1 TABLET ORAL
Qty: 120 TAB | Refills: 0 | Status: ON HOLD | OUTPATIENT
Start: 2019-03-26 | End: 2019-05-02

## 2019-03-26 RX ORDER — FENTANYL 75 UG/H
1 PATCH TRANSDERMAL
Qty: 10 PATCH | Refills: 0 | Status: ON HOLD | OUTPATIENT
Start: 2019-03-26 | End: 2019-05-02

## 2019-04-04 ENCOUNTER — OFFICE VISIT (OUTPATIENT)
Dept: INTERNAL MEDICINE CLINIC | Age: 59
End: 2019-04-04

## 2019-04-04 VITALS
BODY MASS INDEX: 27.94 KG/M2 | DIASTOLIC BLOOD PRESSURE: 92 MMHG | WEIGHT: 195.2 LBS | TEMPERATURE: 98.6 F | RESPIRATION RATE: 16 BRPM | HEIGHT: 70 IN | OXYGEN SATURATION: 97 % | HEART RATE: 90 BPM | SYSTOLIC BLOOD PRESSURE: 152 MMHG

## 2019-04-04 DIAGNOSIS — I10 ESSENTIAL HYPERTENSION: ICD-10-CM

## 2019-04-04 DIAGNOSIS — F32.A DEPRESSION, UNSPECIFIED DEPRESSION TYPE: ICD-10-CM

## 2019-04-04 DIAGNOSIS — D57.1 HB-SS DISEASE WITHOUT CRISIS (HCC): ICD-10-CM

## 2019-04-04 DIAGNOSIS — G56.01 CARPAL TUNNEL SYNDROME OF RIGHT WRIST: Primary | ICD-10-CM

## 2019-04-04 NOTE — PROGRESS NOTES
[address] [place] [address] and Primary Care  [place] [address]  [address]  [place] [address]  Phone:  [phone]  Fax: [phone]       Chief Complaint   Patient presents with    Carpal Tunnel     right wrist   .      SUBJECTIVE:    Cj Cardenas is a 66 y.o. female Comes in for return visit stating that she has done reasonably well. She has been having increasing paresthesias and numbness of her right hand. She has known carpal tunnel syndrome. Her sickle cell disease has been reasonably stable. She is concerned about her antihypertensive medication. I explained to her that there has been no formal recall and I suspect the letter she received is related more to the fact that they did not have it available. This should improve with availability. She did not press the issue about changing medication, however. She is taking her antidepressant as prescribed. She is reasonably active. Current Outpatient Medications   Medication Sig Dispense Refill    fentaNYL (DURAGESIC) 75 mcg/hr 1 Patch by TransDERmal route every seventy-two (72) hours for 30 days. Max Daily Amount: 1 Patch. 10 Patch 0    HYDROcodone-acetaminophen (NORCO)  mg tablet Take 1 Tab by mouth every six (6) hours as needed for Pain for up to 30 days. Max Daily Amount: 4 Tabs. Indications: sickl;e cell crisis 120 Tab 0    diphenhydrAMINE (BENADRYL) 25 mg tablet Take 50 mg by mouth daily as needed (for itching with hydrocodone).  hydrocortisone (CORTISONE) 1 % topical cream Apply  to affected area two (2) times daily as needed for Skin Irritation. use thin layer      folic acid (FOLVITE) 1 mg tablet Take 1 Tab by mouth daily.  30 Tab 11    citalopram (CELEXA) 10 mg tablet TAKE 1 TABLET BY MOUTH EVERY NIGHT AT BEDTIME 90 Tab 3    losartan-hydroCHLOROthiazide (HYZAAR) 50-12.5 mg per tablet TAKE 1 TABLET BY MOUTH DAILY 90 Tab 3    ondansetron (ZOFRAN ODT) 4 mg disintegrating tablet Take 1 Tab by mouth every eight (8) hours as needed for Nausea.  10 Tab 0     Past Medical History:   Diagnosis Date    Anemia     SC hemoglobinopathy    Chronic pain     Depression     Hypertension     Liver disease     hepatitis C; pt reports \"cured\"    Sickle cell disease ([address]      Past Surgical History:   Procedure Laterality Date    BREAST SURGERY PROCEDURE UNLISTED      2 cysts removed from R breast    CHEST SURGERY PROCEDURE UNLISTED      status post thor for removal of a benign     HX BREAST BIOPSY Right 05/2007    negative    HX CHOLECYSTECTOMY      HX ORTHOPAEDIC      bony curettage of an ostial myelitis focus     Allergies   Allergen Reactions    Dilaudid [Hydromorphone (Bulk)] Itching     Can tolerate with benadryl and ondansetron    Percocet [Oxycodone-Acetaminophen] Itching         REVIEW OF SYSTEMS:  General: negative for - chills or fever  ENT: negative for - headaches, nasal congestion or tinnitus  Respiratory: negative for - cough, hemoptysis, shortness of breath or wheezing  Cardiovascular : negative for - chest pain, edema, palpitations or shortness of breath  Gastrointestinal: negative for - abdominal pain, blood in stools, heartburn or nausea/vomiting  Genito-Urinary: no dysuria, trouble voiding, or hematuria  Musculoskeletal: negative for - gait disturbance, joint pain, joint stiffness or joint swelling  Neurological: no TIA or stroke symptoms  Hematologic: no bruises, no bleeding, no swollen glands  Integument: no lumps, mole changes, nail changes or rash  Endocrine: no malaise/lethargy or unexpected weight changes      Social History     Socioeconomic History    Marital status:      Spouse name: Not on file    Number of children: 2    Years of education: Not on file    Highest education level: Not on file   Occupational History    Occupation: disabled---Sickle cell disease   Tobacco Use    Smoking status: Never Smoker    Smokeless tobacco: Never Used   Substance and Sexual Activity    Alcohol use: No    Drug use: No    Sexual activity: Yes     Partners: Male     Family History   Problem Relation Age of Onset    Hypertension Mother     Hypertension Father        OBJECTIVE:    Visit Vitals  BP (!) 152/92   Pulse 90   Temp 98.6 °F (37 °C) (Oral)   Resp 16   Ht 5' 10\" (1.778 m)   Wt 195 lb 3.2 oz (88.5 kg)   SpO2 97%   BMI 28.01 kg/m²     CONSTITUTIONAL: well , well nourished, appears age appropriate  EYES: perrla, eom intact  ENMT:moist mucous membranes, pharynx clear  NECK: supple. Thyroid normal  RESPIRATORY: Chest: clear to ascultation and percussion   CARDIOVASCULAR: Heart: regular rate and rhythm  GASTROINTESTINAL: Abdomen: soft, bowel sounds active  HEMATOLOGIC: no pathological lymph nodes palpated  MUSCULOSKELETAL: Extremities: no edema, pulse 1+   INTEGUMENT: No unusual rashes or suspicious skin lesions noted. Nails appear normal.  NEUROLOGIC: non-focal exam   MENTAL STATUS: alert and oriented, appropriate affect      ASSESSMENT:  1. Carpal tunnel syndrome of right wrist    2. Essential hypertension    3. Depression, unspecified depression type    4. Hb-SS disease without crisis ([facility] [address]        PLAN:    1. She has symptomatic carpal tunnel syndrome in her right hand. Under sterile technique, I inject Kenalog 20 mg into the right carpal tunnel region. She tolerates the procedure well. 2. Her blood pressure is slightly elevated, primarily because she has not taken her medication. I will discuss this further with the patient as to whether she wants to continue or change. 3. Her depression is doing reasonably well. She is tolerating her new home situation. 4. As far as her sickle cell disease is concerned, she is comfortable with her current analgesics. Follow-up and Dispositions    · Return in about 3 months (around 7/4/2019).            Shamika Allen MD

## 2019-04-04 NOTE — PROGRESS NOTES
Chief Complaint   Patient presents with    Carpal Tunnel     right wrist     1. Have you been to the ER, urgent care clinic since your last visit? Hospitalized since your last visit? No    2. Have you seen or consulted any other health care providers outside of the [facility] since your last visit? Include any pap smears or colon screening. No    A steroid injection was performed at wrist  using 1% plain Lidocaine and 40 mg of Kenalog. This was well tolerated. [facility]  OFFICE PROCEDURE PROGRESS NOTE        Chart reviewed for the following:   Raúl OWUSU, have reviewed the History, Physical and updated the Allergic reactions for [address] performed immediately prior to start of procedure:   Raúl OWUSU, have performed the following reviews on Bharat Hernández prior to the start of the procedure:            * Patient was identified by name and date of birth   * Agreement on procedure being performed was verified  * Risks and Benefits explained to the patient  * Procedure site verified and marked as necessary  * Patient was positioned for comfort  * Consent was signed and verified     Time: 10:40 am      Date of procedure: 4/4/2019    Procedure performed by:  John Trujillo MD    Provider assisted by:  Sarthak Gonzalez LPN    Patient assisted by: N/A    How tolerated by patient: tolerated the procedure well with no complications    Post Procedural Pain Scale: 2 - Hurts Little Bit    Comments: none

## 2019-04-14 ENCOUNTER — HOSPITAL ENCOUNTER (INPATIENT)
Age: 59
LOS: 18 days | Discharge: HOME | DRG: 812 | End: 2019-05-02
Attending: EMERGENCY MEDICINE | Admitting: INTERNAL MEDICINE
Payer: MEDICARE

## 2019-04-14 DIAGNOSIS — D57.00 SICKLE CELL PAIN CRISIS (HCC): Primary | ICD-10-CM

## 2019-04-14 DIAGNOSIS — D57.00 SICKLE CELL CRISIS (HCC): ICD-10-CM

## 2019-04-14 LAB
ALBUMIN SERPL-MCNC: 4.2 G/DL (ref 3.5–5)
ALBUMIN/GLOB SERPL: 0.8 {RATIO} (ref 1.1–2.2)
ALP SERPL-CCNC: 122 U/L (ref 45–117)
ALT SERPL-CCNC: 17 U/L (ref 12–78)
ANION GAP SERPL CALC-SCNC: 8 MMOL/L (ref 5–15)
AST SERPL-CCNC: 42 U/L (ref 15–37)
BASOPHILS # BLD: 0.1 K/UL (ref 0–0.1)
BASOPHILS NFR BLD: 1 % (ref 0–1)
BILIRUB SERPL-MCNC: 1.5 MG/DL (ref 0.2–1)
BUN SERPL-MCNC: 11 MG/DL (ref 6–20)
BUN/CREAT SERPL: 17 (ref 12–20)
CALCIUM SERPL-MCNC: 8.9 MG/DL (ref 8.5–10.1)
CHLORIDE SERPL-SCNC: 108 MMOL/L (ref 97–108)
CO2 SERPL-SCNC: 22 MMOL/L (ref 21–32)
CREAT SERPL-MCNC: 0.65 MG/DL (ref 0.55–1.02)
DIFFERENTIAL METHOD BLD: ABNORMAL
EOSINOPHIL # BLD: 0.2 K/UL (ref 0–0.4)
EOSINOPHIL NFR BLD: 2 % (ref 0–7)
ERYTHROCYTE [DISTWIDTH] IN BLOOD BY AUTOMATED COUNT: 17.8 % (ref 11.5–14.5)
GLOBULIN SER CALC-MCNC: 5.4 G/DL (ref 2–4)
GLUCOSE SERPL-MCNC: 101 MG/DL (ref 65–100)
HCT VFR BLD AUTO: 26.9 % (ref 35–47)
HGB BLD-MCNC: 9.6 G/DL (ref 11.5–16)
IMM GRANULOCYTES # BLD AUTO: 0 K/UL (ref 0–0.04)
IMM GRANULOCYTES NFR BLD AUTO: 0 % (ref 0–0.5)
LDH SERPL L TO P-CCNC: 458 U/L (ref 81–246)
LYMPHOCYTES # BLD: 4.8 K/UL (ref 0.8–3.5)
LYMPHOCYTES NFR BLD: 47 % (ref 12–49)
MAGNESIUM SERPL-MCNC: 2.4 MG/DL (ref 1.6–2.4)
MCH RBC QN AUTO: 31.7 PG (ref 26–34)
MCHC RBC AUTO-ENTMCNC: 35.7 G/DL (ref 30–36.5)
MCV RBC AUTO: 88.8 FL (ref 80–99)
MONOCYTES # BLD: 1.2 K/UL (ref 0–1)
MONOCYTES NFR BLD: 11 % (ref 5–13)
NEUTS SEG # BLD: 4 K/UL (ref 1.8–8)
NEUTS SEG NFR BLD: 39 % (ref 32–75)
NRBC # BLD: 0.04 K/UL (ref 0–0.01)
NRBC BLD-RTO: 0.4 PER 100 WBC
PLATELET # BLD AUTO: 363 K/UL (ref 150–400)
PMV BLD AUTO: 11.5 FL (ref 8.9–12.9)
POTASSIUM SERPL-SCNC: 3.7 MMOL/L (ref 3.5–5.1)
PROT SERPL-MCNC: 9.6 G/DL (ref 6.4–8.2)
RBC # BLD AUTO: 3.03 M/UL (ref 3.8–5.2)
RETICS # AUTO: 0.12 M/UL (ref 0.02–0.08)
RETICS/RBC NFR AUTO: 4 % (ref 0.7–2.1)
SODIUM SERPL-SCNC: 138 MMOL/L (ref 136–145)
WBC # BLD AUTO: 10.2 K/UL (ref 3.6–11)

## 2019-04-14 PROCEDURE — 74011250636 HC RX REV CODE- 250/636: Performed by: INTERNAL MEDICINE

## 2019-04-14 PROCEDURE — 80053 COMPREHEN METABOLIC PANEL: CPT

## 2019-04-14 PROCEDURE — 96374 THER/PROPH/DIAG INJ IV PUSH: CPT

## 2019-04-14 PROCEDURE — 74011250637 HC RX REV CODE- 250/637: Performed by: INTERNAL MEDICINE

## 2019-04-14 PROCEDURE — 96375 TX/PRO/DX INJ NEW DRUG ADDON: CPT

## 2019-04-14 PROCEDURE — 99283 EMERGENCY DEPT VISIT LOW MDM: CPT

## 2019-04-14 PROCEDURE — 36415 COLL VENOUS BLD VENIPUNCTURE: CPT

## 2019-04-14 PROCEDURE — 83735 ASSAY OF MAGNESIUM: CPT

## 2019-04-14 PROCEDURE — 96376 TX/PRO/DX INJ SAME DRUG ADON: CPT

## 2019-04-14 PROCEDURE — 74011250636 HC RX REV CODE- 250/636: Performed by: EMERGENCY MEDICINE

## 2019-04-14 PROCEDURE — 65660000000 HC RM CCU STEPDOWN

## 2019-04-14 PROCEDURE — 85025 COMPLETE CBC W/AUTO DIFF WBC: CPT

## 2019-04-14 PROCEDURE — 83615 LACTATE (LD) (LDH) ENZYME: CPT

## 2019-04-14 PROCEDURE — 74011250637 HC RX REV CODE- 250/637: Performed by: EMERGENCY MEDICINE

## 2019-04-14 PROCEDURE — 85045 AUTOMATED RETICULOCYTE COUNT: CPT

## 2019-04-14 RX ORDER — ONDANSETRON 2 MG/ML
8 INJECTION INTRAMUSCULAR; INTRAVENOUS
Status: COMPLETED | OUTPATIENT
Start: 2019-04-14 | End: 2019-04-14

## 2019-04-14 RX ORDER — DOCUSATE SODIUM 100 MG/1
100 CAPSULE, LIQUID FILLED ORAL 2 TIMES DAILY
Status: DISCONTINUED | OUTPATIENT
Start: 2019-04-14 | End: 2019-05-02 | Stop reason: HOSPADM

## 2019-04-14 RX ORDER — NALOXONE HYDROCHLORIDE 0.4 MG/ML
0.4 INJECTION, SOLUTION INTRAMUSCULAR; INTRAVENOUS; SUBCUTANEOUS AS NEEDED
Status: DISCONTINUED | OUTPATIENT
Start: 2019-04-14 | End: 2019-05-02 | Stop reason: HOSPADM

## 2019-04-14 RX ORDER — HYDROCODONE BITARTRATE AND ACETAMINOPHEN 10; 325 MG/1; MG/1
1 TABLET ORAL
Status: DISCONTINUED | OUTPATIENT
Start: 2019-04-14 | End: 2019-04-17

## 2019-04-14 RX ORDER — DIPHENHYDRAMINE HCL 25 MG
25 CAPSULE ORAL
Status: COMPLETED | OUTPATIENT
Start: 2019-04-14 | End: 2019-04-14

## 2019-04-14 RX ORDER — FENTANYL CITRATE 50 UG/ML
100 INJECTION, SOLUTION INTRAMUSCULAR; INTRAVENOUS
Status: COMPLETED | OUTPATIENT
Start: 2019-04-14 | End: 2019-04-14

## 2019-04-14 RX ORDER — ENOXAPARIN SODIUM 100 MG/ML
40 INJECTION SUBCUTANEOUS EVERY 24 HOURS
Status: DISCONTINUED | OUTPATIENT
Start: 2019-04-14 | End: 2019-05-02 | Stop reason: HOSPADM

## 2019-04-14 RX ORDER — IBUPROFEN 200 MG
400 TABLET ORAL
COMMUNITY
End: 2019-08-08

## 2019-04-14 RX ORDER — PROMETHAZINE HYDROCHLORIDE 25 MG/1
25 TABLET ORAL
COMMUNITY
End: 2019-05-09 | Stop reason: SDUPTHER

## 2019-04-14 RX ORDER — ONDANSETRON 4 MG/1
4 TABLET, ORALLY DISINTEGRATING ORAL
Status: DISCONTINUED | OUTPATIENT
Start: 2019-04-14 | End: 2019-05-02 | Stop reason: HOSPADM

## 2019-04-14 RX ORDER — HYDROMORPHONE HYDROCHLORIDE 1 MG/ML
1 INJECTION, SOLUTION INTRAMUSCULAR; INTRAVENOUS; SUBCUTANEOUS
Status: DISCONTINUED | OUTPATIENT
Start: 2019-04-14 | End: 2019-04-15

## 2019-04-14 RX ORDER — ACETAMINOPHEN 325 MG/1
650 TABLET ORAL
Status: DISCONTINUED | OUTPATIENT
Start: 2019-04-14 | End: 2019-05-02 | Stop reason: HOSPADM

## 2019-04-14 RX ORDER — DIPHENHYDRAMINE HCL 25 MG
25 CAPSULE ORAL
Status: DISCONTINUED | OUTPATIENT
Start: 2019-04-14 | End: 2019-05-02 | Stop reason: HOSPADM

## 2019-04-14 RX ORDER — SODIUM CHLORIDE 9 MG/ML
125 INJECTION, SOLUTION INTRAVENOUS CONTINUOUS
Status: DISCONTINUED | OUTPATIENT
Start: 2019-04-14 | End: 2019-04-15

## 2019-04-14 RX ADMIN — FENTANYL CITRATE 100 MCG: 50 INJECTION, SOLUTION INTRAMUSCULAR; INTRAVENOUS at 04:13

## 2019-04-14 RX ADMIN — ONDANSETRON 4 MG: 4 TABLET, ORALLY DISINTEGRATING ORAL at 16:59

## 2019-04-14 RX ADMIN — HYDROCODONE BITARTRATE AND ACETAMINOPHEN 1 TABLET: 10; 325 TABLET ORAL at 10:01

## 2019-04-14 RX ADMIN — ONDANSETRON 4 MG: 4 TABLET, ORALLY DISINTEGRATING ORAL at 11:39

## 2019-04-14 RX ADMIN — HYDROMORPHONE HYDROCHLORIDE 1 MG: 1 INJECTION, SOLUTION INTRAMUSCULAR; INTRAVENOUS; SUBCUTANEOUS at 20:54

## 2019-04-14 RX ADMIN — DIPHENHYDRAMINE HYDROCHLORIDE 25 MG: 25 CAPSULE ORAL at 16:59

## 2019-04-14 RX ADMIN — ENOXAPARIN SODIUM 40 MG: 40 INJECTION SUBCUTANEOUS at 10:01

## 2019-04-14 RX ADMIN — FENTANYL CITRATE 100 MCG: 50 INJECTION, SOLUTION INTRAMUSCULAR; INTRAVENOUS at 06:36

## 2019-04-14 RX ADMIN — ONDANSETRON 4 MG: 4 TABLET, ORALLY DISINTEGRATING ORAL at 20:54

## 2019-04-14 RX ADMIN — DIPHENHYDRAMINE HYDROCHLORIDE 25 MG: 25 CAPSULE ORAL at 04:13

## 2019-04-14 RX ADMIN — ONDANSETRON 8 MG: 2 INJECTION INTRAMUSCULAR; INTRAVENOUS at 05:15

## 2019-04-14 RX ADMIN — DIPHENHYDRAMINE HYDROCHLORIDE 25 MG: 25 CAPSULE ORAL at 20:54

## 2019-04-14 RX ADMIN — HYDROMORPHONE HYDROCHLORIDE 1 MG: 1 INJECTION, SOLUTION INTRAMUSCULAR; INTRAVENOUS; SUBCUTANEOUS at 11:34

## 2019-04-14 RX ADMIN — DOCUSATE SODIUM 100 MG: 100 CAPSULE, LIQUID FILLED ORAL at 10:01

## 2019-04-14 RX ADMIN — HYDROMORPHONE HYDROCHLORIDE 1 MG: 1 INJECTION, SOLUTION INTRAMUSCULAR; INTRAVENOUS; SUBCUTANEOUS at 16:59

## 2019-04-14 RX ADMIN — SODIUM CHLORIDE 125 ML/HR: 900 INJECTION, SOLUTION INTRAVENOUS at 09:36

## 2019-04-14 RX ADMIN — SODIUM CHLORIDE 125 ML/HR: 900 INJECTION, SOLUTION INTRAVENOUS at 23:17

## 2019-04-14 NOTE — ED NOTES
Assumed care of patient. Bedside shift change report received from [address] [place] [address] (offgoing nurse) by Fiorella Rivas (oncoming nurse). Given using SBAR, ED summary, MAR and recent results.

## 2019-04-14 NOTE — PROGRESS NOTES
Oncology End of Shift Note Bedside shift change report given to Keri Balderrama RN (incoming nurse) by Elif Denson (outgoing nurse) on [address]. Report included the following information SBAR, Kardex, ED Summary and MAR. Shift Summary: admission database complete, no significant events Issues for Physician to Address:  none Patient on Cardiac Monitoring? [x] Yes 
[] No 
 
Rhythm: NSR Shift Events Elif Denson

## 2019-04-14 NOTE — H&P
Hospitalist Admission NoteNAME: Belle Crowley :  1960 MRN:  [id number] Date/Time:  2019 9:00 AM 
 
Patient PCP: Lana Muñoz MD 
_____________________________________________________________________ Given the patient's current clinical presentation, I have a high level of concern for decompensation if discharged from the emergency department. Complex decision making was performed, which includes reviewing the patient's available past medical records, laboratory results, and x-ray films. My assessment of this patient's clinical condition and my plan of care is as follows. Assessment / Plan: 
 
Sickle cell pain crisis 
-her typical pain crisis. No CP or SOB. Not hypoxic. -IVF hydration 
-norco and dilaudid prn for pain -bowel regime HTN 
-she is not taking her losartan as she says that it is being switched to something else. Will let Dr Estephanie Simon decide in AM.  Her BP is okay at the moment. Depression 
-continue celexa Code Status:  Full Surrogate Decision Maker:  No mPOA DVT Prophylaxis:  lovenox GI Prophylaxis: not indicated Subjective: CHIEF COMPLAINT:   Leg pain HISTORY OF PRESENT ILLNESS:    
Cherie Marinelli is a 66 y.o.  female with sickle cell who presents with worsening leg pain. She reports sputtering pain over the last week and has been using norco prn at home. She is no longer on fentanyl patches due to insurance problems. Her pain has worsened and now involves her elbows. This is similar to her usual pain crisis so she presented to the ER today. Despite ER intervention, her pain has not improved so we were asked to admit for work up and evaluation of the above problems. Past Medical History:  
Diagnosis Date  Anemia SC hemoglobinopathy  Chronic pain  Depression  Hypertension  Liver disease   
 hepatitis C; pt reports \"cured\"  Sickle cell disease ([facility] [address] Past Surgical History:  
Procedure Laterality Date  BREAST SURGERY PROCEDURE UNLISTED 2 cysts removed from R breast  
 CHEST SURGERY PROCEDURE UNLISTED    
 status post thor for removal of a benign  HX BREAST BIOPSY Right 05/2007  
 negative  HX CHOLECYSTECTOMY  HX ORTHOPAEDIC    
 bony curettage of an ostial myelitis focus Social History Tobacco Use  Smoking status: Never Smoker  Smokeless tobacco: Never Used Substance Use Topics  Alcohol use: No  
  
 
Family History Problem Relation Age of Onset  Hypertension Mother  Hypertension Father Allergies Allergen Reactions  Dilaudid [Hydromorphone (Bulk)] Itching Can tolerate with benadryl and ondansetron  Percocet [Oxycodone-Acetaminophen] Itching Prior to Admission medications Medication Sig Start Date End Date Taking? Authorizing Provider  
fentaNYL (DURAGESIC) 75 mcg/hr 1 Patch by TransDERmal route every seventy-two (72) hours for 30 days. Max Daily Amount: 1 Patch. 3/26/19 4/25/19  John Paul Cantu MD  
HYDROcodone-acetaminophen [facility])  mg tablet Take 1 Tab by mouth every six (6) hours as needed for Pain for up to 30 days. Max Daily Amount: 4 Tabs. Indications: sickl;e cell crisis 3/26/19 4/25/19  John Paul Cantu MD  
diphenhydrAMINE (BENADRYL) 25 mg tablet Take 50 mg by mouth daily as needed (for itching with hydrocodone). Provider, Historical  
hydrocortisone (CORTISONE) 1 % topical cream Apply  to affected area two (2) times daily as needed for Skin Irritation. use thin layer    Provider, Historical  
folic acid (FOLVITE) 1 mg tablet Take 1 Tab by mouth daily.  1/10/19   John Paul Cantu MD  
citalopram (CELEXA) 10 mg tablet TAKE 1 TABLET BY MOUTH EVERY NIGHT AT BEDTIME 1/8/19   John Paul Cantu MD  
losartan-hydroCHLOROthiazide [facility]) 50-12.5 mg per tablet TAKE 1 TABLET BY MOUTH DAILY 12/27/18   John Paul Cantu MD  
 ondansetron (ZOFRAN ODT) 4 mg disintegrating tablet Take 1 Tab by mouth every eight (8) hours as needed for Nausea. 7/15/18   Juan A Gee MD  
 
 
REVIEW OF SYSTEMS:    
 
Total of 12 systems reviewed as follows:   
   POSITIVE= underlined text  Negative = text not underlined General:  fever, chills, sweats, generalized weakness, weight loss/gain,  
   loss of appetite Eyes:    blurred vision, eye pain, loss of vision, double vision ENT:    rhinorrhea, pharyngitis Respiratory:   cough, sputum production, SOB, MARS, wheezing, pleuritic pain  
Cardiology:   chest pain, palpitations, orthopnea, PND, edema, syncope Gastrointestinal:  abdominal pain , N/V, diarrhea, dysphagia, constipation, bleeding Genitourinary:  frequency, urgency, dysuria, hematuria, incontinence Muskuloskeletal :  arthralgia, myalgia, back pain, leg and arm pain Hematology:  easy bruising, nose or gum bleeding, lymphadenopathy Dermatological: rash, ulceration, pruritis, color change / jaundice Endocrine:   hot flashes or polydipsia Neurological:  headache, dizziness, confusion, focal weakness, paresthesia, Speech difficulties, memory loss, gait difficulty Psychological: Feelings of anxiety, depression, agitation Objective: VITALS:   
Visit Vitals /75 (BP 1 Location: Left arm, BP Patient Position: At rest) Pulse 89 Temp 98.5 °F (36.9 °C) Resp 16 Ht 5' 11\" (1.803 m) Wt 86.1 kg (189 lb 13.1 oz) SpO2 99% BMI 26.47 kg/m² PHYSICAL EXAM: 
 
 
_______________________________________________________________________ Care Plan discussed with: 
  Comments Patient x Family RN Care Manager Consultant:     
_______________________________________________________________________ Expected  Disposition:  
Home with Family x HH/PT/OT/RN   
SNF/LTC   
JEREMY   
________________________________________________________________________ TOTAL TIME:  40  Minutes Critical Care Provided     Minutes non procedure based Comments  
 x Reviewed previous records  
>50% of visit spent in counseling and coordination of care  Discussion with patient and/or family and questions answered Given the patient's current clinical presentation, I have a high level of concern for decompensation if discharged from the ED. Complex decision making was performed which includes reviewing the patient's available past medical records, laboratory results, and Xray films. I have also directly communicated my plan and discussed this case with the involved ED physician.  
 
____________________________________________________________________ Anderson Salcedo MD 
 
Procedures: see electronic medical records for all procedures/Xrays and details which were not copied into this note but were reviewed prior to creation of Plan. LAB DATA REVIEWED:   
Recent Results (from the past 24 hour(s)) CBC WITH AUTOMATED DIFF Collection Time: 04/14/19  3:13 AM  
Result Value Ref Range WBC 10.2 3.6 - 11.0 K/uL RBC 3.03 (L) 3.80 - 5.20 M/uL HGB 9.6 (L) 11.5 - 16.0 g/dL HCT 26.9 (L) 35.0 - 47.0 % MCV 88.8 80.0 - 99.0 FL  
 MCH 31.7 26.0 - 34.0 PG  
 MCHC 35.7 30.0 - 36.5 g/dL  
 RDW 17.8 (H) 11.5 - 14.5 % PLATELET 077 426 - 423 K/uL MPV 11.5 8.9 - 12.9 FL  
 NRBC 0.4 (H) 0  WBC ABSOLUTE NRBC 0.04 (H) 0.00 - 0.01 K/uL NEUTROPHILS 39 32 - 75 % LYMPHOCYTES 47 12 - 49 % MONOCYTES 11 5 - 13 % EOSINOPHILS 2 0 - 7 % BASOPHILS 1 0 - 1 % IMMATURE GRANULOCYTES 0 0.0 - 0.5 % ABS. NEUTROPHILS 4.0 1.8 - 8.0 K/UL  
 ABS. LYMPHOCYTES 4.8 (H) 0.8 - 3.5 K/UL  
 ABS. MONOCYTES 1.2 (H) 0.0 - 1.0 K/UL  
 ABS. EOSINOPHILS 0.2 0.0 - 0.4 K/UL  
 ABS. BASOPHILS 0.1 0.0 - 0.1 K/UL  
 ABS. IMM. GRANS. 0.0 0.00 - 0.04 K/UL  
 DF AUTOMATED METABOLIC PANEL, COMPREHENSIVE Collection Time: 04/14/19  3:13 AM  
Result Value Ref Range Sodium 138 136 - 145 mmol/L Potassium 3.7 3.5 - 5.1 mmol/L Chloride 108 97 - 108 mmol/L  
 CO2 22 21 - 32 mmol/L Anion gap 8 5 - 15 mmol/L Glucose 101 (H) 65 - 100 mg/dL BUN 11 6 - 20 MG/DL Creatinine 0.65 0.55 - 1.02 MG/DL  
 BUN/Creatinine ratio 17 12 - 20 GFR est AA >60 >60 ml/min/1.73m2 GFR est non-AA >60 >60 ml/min/1.73m2 Calcium 8.9 8.5 - 10.1 MG/DL Bilirubin, total 1.5 (H) 0.2 - 1.0 MG/DL  
 ALT (SGPT) 17 12 - 78 U/L  
 AST (SGOT) 42 (H) 15 - 37 U/L Alk. phosphatase 122 (H) 45 - 117 U/L Protein, total 9.6 (H) 6.4 - 8.2 g/dL Albumin 4.2 3.5 - 5.0 g/dL Globulin 5.4 (H) 2.0 - 4.0 g/dL A-G Ratio 0.8 (L) 1.1 - 2.2 RETICULOCYTE COUNT Collection Time: 04/14/19  3:13 AM  
Result Value Ref Range Reticulocyte count 4.0 (H) 0.7 - 2.1 % Absolute Retic Cnt. 0.1193 (H) 0.0164 - 0.0776 M/ul LD Collection Time: 04/14/19  3:13 AM  
Result Value Ref Range  (H) 81 - 246 U/L  
MAGNESIUM Collection Time: 04/14/19  3:13 AM  
Result Value Ref Range Magnesium 2.4 1.6 - 2.4 mg/dL

## 2019-04-14 NOTE — ED NOTES
Discharge instructions reviewed with pt by Dr. Ga Knox. Pt able to return/verbalize discharge instructions. Copy of discharge instructions given. Patient condition stable, respiratory status within normal limits, neuro status intact.

## 2019-04-14 NOTE — ED NOTES
RN @ bedside updated on care. All verbalized understanding. Pt resting comfortably. V/S stable, no distress noted. Will cont to assess and monitor closely.

## 2019-04-14 NOTE — ED NOTES
TRANSFER - OUT REPORT: 
 
Verbal report given to Christa Lynne (name) on Benny Dears  being transferred to [id number] [facility] Oncology (unit) for routine progression of care Report consisted of patients Situation, Background, Assessment and  
Recommendations(SBAR). Information from the following report(s) SBAR, ED Summary, [facility] and Recent Results was reviewed with the receiving nurse. Lines:    
 
Opportunity for questions and clarification was provided.

## 2019-04-14 NOTE — ED NOTES
Pt ambulatory to bathroom with steady gait and no assistance. Pt states her pain is under control at this time.

## 2019-04-14 NOTE — ED PROVIDER NOTES
EMERGENCY DEPARTMENT HISTORY AND PHYSICAL EXAM 
     
 
Date: 4/14/2019 Patient Name: Anthony Obregon History of Presenting Illness Chief Complaint Patient presents with  Sickle Cell Crisis  
  notes having pain sharp to knees & elbows-since friday & not getting better History Provided By: Patient HPI: Anthony Obregon is a 66 y.o. female, pmhx sickle cell disease, who presents ambulatory to the ED c/o body pain Patient notes she has been having pain in both arms and both knees since Friday. She normally takes Norco 10/3/2025 every 6 hours but she states it is not been relieving her pain she was on fentanyl patches in the past but states her insurance is no longer covering it and required preauthorization so she has not had any recently. She denies any fevers, chills, nausea, vomiting, chest pain, coughing, shortness of breath, abdominal pain, back pain, skin redness and joint swelling. PCP: Homer Delvalle MD 
 
Allergies: Dilaudid and Percocet Social Hx: -tobacco, -EtOH, There are no other complaints, changes, or physical findings at this time. Current Outpatient Medications Medication Sig Dispense Refill  fentaNYL (DURAGESIC) 75 mcg/hr 1 Patch by TransDERmal route every seventy-two (72) hours for 30 days. Max Daily Amount: 1 Patch. 10 Patch 0  
 HYDROcodone-acetaminophen (NORCO)  mg tablet Take 1 Tab by mouth every six (6) hours as needed for Pain for up to 30 days. Max Daily Amount: 4 Tabs. Indications: sickl;e cell crisis 120 Tab 0  
 diphenhydrAMINE (BENADRYL) 25 mg tablet Take 50 mg by mouth daily as needed (for itching with hydrocodone).  hydrocortisone (CORTISONE) 1 % topical cream Apply  to affected area two (2) times daily as needed for Skin Irritation. use thin layer  folic acid (FOLVITE) 1 mg tablet Take 1 Tab by mouth daily.  30 Tab 11  
 citalopram (CELEXA) 10 mg tablet TAKE 1 TABLET BY MOUTH EVERY NIGHT AT BEDTIME 90 Tab 3  
  losartan-hydroCHLOROthiazide (HYZAAR) 50-12.5 mg per tablet TAKE 1 TABLET BY MOUTH DAILY 90 Tab 3  
 ondansetron (ZOFRAN ODT) 4 mg disintegrating tablet Take 1 Tab by mouth every eight (8) hours as needed for Nausea. 10 Tab 0 Past History Past Medical History: 
Past Medical History:  
Diagnosis Date  Anemia SC hemoglobinopathy  Chronic pain  Depression  Hypertension  Liver disease   
 hepatitis C; pt reports \"cured\"  Sickle cell disease ([address] Past Surgical History: 
Past Surgical History:  
Procedure Laterality Date  BREAST SURGERY PROCEDURE UNLISTED 2 cysts removed from R breast  
 CHEST SURGERY PROCEDURE UNLISTED    
 status post thor for removal of a benign  HX BREAST BIOPSY Right 05/2007  
 negative  HX CHOLECYSTECTOMY  HX ORTHOPAEDIC    
 bony curettage of an ostial myelitis focus Family History: 
Family History Problem Relation Age of Onset  Hypertension Mother  Hypertension Father Social History: 
Social History Tobacco Use  Smoking status: Never Smoker  Smokeless tobacco: Never Used Substance Use Topics  Alcohol use: No  
 Drug use: No  
 
 
Allergies: Allergies Allergen Reactions  Dilaudid [Hydromorphone (Bulk)] Itching Can tolerate with benadryl and ondansetron  Percocet [Oxycodone-Acetaminophen] Itching Review of Systems Review of Systems Constitutional: Negative for activity change, appetite change, chills, fever and unexpected weight change. HENT: Negative for congestion. Eyes: Negative for pain and visual disturbance. Respiratory: Negative for cough and shortness of breath. Cardiovascular: Negative for chest pain. Gastrointestinal: Negative for abdominal pain, diarrhea, nausea and vomiting. Genitourinary: Negative for dysuria. Musculoskeletal: Negative for back pain. Skin: Negative for rash. Neurological: Negative for headaches.   
 
 
Physical Exam  
 Physical Exam  
Constitutional: She is oriented to person, place, and time. She appears well-developed and well-nourished. This is a well-appearing female resting comfortably in bed in minimal distress. HENT:  
Head: Normocephalic and atraumatic. Mouth/Throat: Oropharynx is clear and moist.  
Eyes: Pupils are equal, round, and reactive to light. Conjunctivae and EOM are normal. Right eye exhibits no discharge. Left eye exhibits no discharge. Neck: Normal range of motion. Neck supple. Cardiovascular: Normal rate, regular rhythm and normal heart sounds. No murmur heard. Pulmonary/Chest: Effort normal and breath sounds normal. No respiratory distress. She has no wheezes. She has no rales. Abdominal: Soft. Bowel sounds are normal. She exhibits no distension and no mass. There is no tenderness. There is no rebound and no guarding. Musculoskeletal: Normal range of motion. She exhibits no edema, tenderness or deformity. Joints appear normal with out any edema; normal range of motion bilateral upper and lower extremities. Neurological: She is alert and oriented to person, place, and time. No cranial nerve deficit. She exhibits normal muscle tone. Skin: Skin is warm and dry. No rash noted. She is not diaphoretic. No erythema. No pallor. Nursing note and vitals reviewed. Diagnostic Study Results Labs - Recent Results (from the past 12 hour(s)) CBC WITH AUTOMATED DIFF Collection Time: 04/14/19  3:13 AM  
Result Value Ref Range WBC 10.2 3.6 - 11.0 K/uL  
 RBC 3.03 (L) 3.80 - 5.20 M/uL HGB 9.6 (L) 11.5 - 16.0 g/dL HCT 26.9 (L) 35.0 - 47.0 % MCV 88.8 80.0 - 99.0 FL  
 MCH 31.7 26.0 - 34.0 PG  
 MCHC 35.7 30.0 - 36.5 g/dL  
 RDW 17.8 (H) 11.5 - 14.5 % PLATELET 380 912 - 408 K/uL MPV 11.5 8.9 - 12.9 FL  
 NRBC 0.4 (H) 0  WBC ABSOLUTE NRBC 0.04 (H) 0.00 - 0.01 K/uL NEUTROPHILS 39 32 - 75 % LYMPHOCYTES 47 12 - 49 % MONOCYTES 11 5 - 13 % EOSINOPHILS 2 0 - 7 % BASOPHILS 1 0 - 1 % IMMATURE GRANULOCYTES 0 0.0 - 0.5 % ABS. NEUTROPHILS 4.0 1.8 - 8.0 K/UL  
 ABS. LYMPHOCYTES 4.8 (H) 0.8 - 3.5 K/UL  
 ABS. MONOCYTES 1.2 (H) 0.0 - 1.0 K/UL  
 ABS. EOSINOPHILS 0.2 0.0 - 0.4 K/UL  
 ABS. BASOPHILS 0.1 0.0 - 0.1 K/UL  
 ABS. IMM. GRANS. 0.0 0.00 - 0.04 K/UL  
 DF AUTOMATED METABOLIC PANEL, COMPREHENSIVE Collection Time: 04/14/19  3:13 AM  
Result Value Ref Range Sodium 138 136 - 145 mmol/L Potassium 3.7 3.5 - 5.1 mmol/L Chloride 108 97 - 108 mmol/L  
 CO2 22 21 - 32 mmol/L Anion gap 8 5 - 15 mmol/L Glucose 101 (H) 65 - 100 mg/dL BUN 11 6 - 20 MG/DL Creatinine 0.65 0.55 - 1.02 MG/DL  
 BUN/Creatinine ratio 17 12 - 20 GFR est AA >60 >60 ml/min/1.73m2 GFR est non-AA >60 >60 ml/min/1.73m2 Calcium 8.9 8.5 - 10.1 MG/DL Bilirubin, total 1.5 (H) 0.2 - 1.0 MG/DL  
 ALT (SGPT) 17 12 - 78 U/L  
 AST (SGOT) 42 (H) 15 - 37 U/L Alk. phosphatase 122 (H) 45 - 117 U/L Protein, total 9.6 (H) 6.4 - 8.2 g/dL Albumin 4.2 3.5 - 5.0 g/dL Globulin 5.4 (H) 2.0 - 4.0 g/dL A-G Ratio 0.8 (L) 1.1 - 2.2 RETICULOCYTE COUNT Collection Time: 04/14/19  3:13 AM  
Result Value Ref Range Reticulocyte count 4.0 (H) 0.7 - 2.1 % Absolute Retic Cnt. 0.1193 (H) 0.0164 - 0.0776 M/ul LD Collection Time: 04/14/19  3:13 AM  
Result Value Ref Range  (H) 81 - 246 U/L  
MAGNESIUM Collection Time: 04/14/19  3:13 AM  
Result Value Ref Range Magnesium 2.4 1.6 - 2.4 mg/dL Radiologic Studies - No orders to display CT Results  (Last 48 hours) None CXR Results  (Last 48 hours) None Medical Decision Making I am the first provider for this patient. I reviewed the vital signs, available nursing notes, past medical history, past surgical history, family history and social history. Vital Signs-Reviewed the patient's vital signs. Patient Vitals for the past 12 hrs: Temp Pulse Resp BP SpO2  
04/14/19 0831  89 16 139/75 99 % 04/14/19 0600    123/66 99 % 04/14/19 0425     100 % 04/14/19 0425     100 % 04/14/19 0423    152/86   
04/14/19 0251 98.5 °F (36.9 °C) 93 20 (!) 150/123 100 % Pulse Oximetry Analysis - 100% on RA Cardiac Monitor:  
Rate: 90bpm 
Rhythm: Normal Sinus Rhythm Records Reviewed: Nursing Notes, Old Medical Records, Previous Radiology Studies and Previous Laboratory Studies Provider Notes (Medical Decision Making): MDM: Middle-aged sickle cell patient presenting with pain and normal vital signs. Patient is requesting IV Dilaudid and Benadryl for her symptoms. I explained that we no longer use Dilaudid in the emergency room and given her allergies it would be better if we stick to a medication that we know she does not react with. Patient is clearly unhappy about this but is willing to stay for lab results. Low suspicion for acute infection, joint effusion, sepsis. ED Course:  
Initial assessment performed. The patients presenting problems have been discussed, and they are in agreement with the care plan formulated and outlined with them. I have encouraged them to ask questions as they arise throughout their visit. PROGRESS NOTE: 
5:15 AM 
Pt appears well without any acute complaints. IV fluids are infusing. Reviewed her lab results and explained that our goal today would be decrease in her pain so she could go home and continue her oral medications. 6:30 AM 
Patient is requesting further IV narcotics prior to discharge. Vital signs remained stable post fluid infusion. 08:30 Patient sleeping comfortably with stable VS. Upon arousing she states she feels okay. However, on attempt to give her d/c papers, she is refusing to leave and states I will be right back you need to admit me.  Offered her oral doses of her medications and she said no, I need to be admitted. Will reach out to the hospitalist team for evaluation. CONSULT NOTE:  
8:37 AM 
Pepe Narayan MD  spoke with Dr Sis Sweet, Specialty: Hospitalist 
Discussed pt's hx, disposition, and available diagnostic and imaging results. Reviewed care plans. Consultant agrees with plans as outlined. He will evalaute her for admission. Critical Care Time:  
0 Diagnosis Clinical Impression: 1. Sickle cell pain crisis ([facility] [address] PLAN: 
1. Admission for pain control

## 2019-04-14 NOTE — ED NOTES
While in room to discuss discharge, pt expresses concern about her pain management when she leaves and states \"joy been like this before and I have had to come back to the hospital the same day for my pain\"

## 2019-04-14 NOTE — PROGRESS NOTES
Pharmacy Clarification of Prior to Admission Medication Regimen The patient was interviewed regarding clarification of the prior to admission medication regimen and was questioned regarding use of any other inhalers, topical products, over the counter medications, herbal medications, vitamin products or ophthalmic/nasal/otic medication use. Information Obtained From: Patient, Alesha Fortune Pertinent Pharmacy Findings: 
fentaNYL ([address]) 75 mcg/hr: Patient stated she has been 'out' of this agent for 'about a week' due to an insurance change. Patient stated she is currently waiting for a PA for this agent 
losartan-hydroCHLOROthiazide (HYZAAR) 50-12.5 mg per tablet: Patient stated she is not taking this agent 'due to the recall' but has NOT been provided an alternative agent by her doctor as of 19 PTA medication list was corrected to the following:  
 
Prior to Admission Medications Prescriptions Last Dose Informant Patient Reported? Taking? HYDROcodone-acetaminophen (NORCO)  mg tablet 2019 at Unknown time Self No Yes Sig: Take 1 Tab by mouth every six (6) hours as needed for Pain for up to 30 days. Max Daily Amount: 4 Tabs. Indications: sickl;e cell crisis  
citalopram (CELEXA) 10 mg tablet 2019 at Unknown time Self No Yes Sig: TAKE 1 TABLET BY MOUTH EVERY NIGHT AT BEDTIME  
diphenhydrAMINE (BENADRYL) 25 mg tablet 2019 at 1800 Self Yes Yes Sig: Take 50 mg by mouth daily as needed (for itching with hydrocodone). fentaNYL (DURAGESIC) 75 mcg/hr 2019 Self No No  
Si Patch by TransDERmal route every seventy-two (72) hours for 30 days. Max Daily Amount: 1 Patch. folic acid (FOLVITE) 1 mg tablet 2019 at Unknown time Self No Yes Sig: Take 1 Tab by mouth daily. hydrocortisone (CORTISONE) 1 % topical cream 3/24/2019 at Unknown time Self Yes Yes Sig: Apply  to affected area two (2) times daily as needed for Skin Irritation. use thin layer ibuprofen (MOTRIN) 200 mg tablet 4/10/2019 at Unknown time Self Yes Yes Sig: Take 400 mg by mouth every six (6) hours as needed for Pain.  
losartan-hydroCHLOROthiazide (HYZAAR) 50-12.5 mg per tablet Not Taking at Unknown time Self No No  
Sig: TAKE 1 TABLET BY MOUTH DAILY promethazine (PHENERGAN) 25 mg tablet 3/31/2019 at Unknown time Self Yes Yes Sig: Take 25 mg by mouth every eight (8) hours as needed for Nausea. Facility-Administered Medications: None Thank you, 
Loy Patel [facility] Medication History Pharmacy Technician

## 2019-04-15 PROCEDURE — 74011250636 HC RX REV CODE- 250/636: Performed by: INTERNAL MEDICINE

## 2019-04-15 PROCEDURE — 65660000000 HC RM CCU STEPDOWN

## 2019-04-15 PROCEDURE — 74011250637 HC RX REV CODE- 250/637: Performed by: INTERNAL MEDICINE

## 2019-04-15 RX ORDER — FOLIC ACID 1 MG/1
1 TABLET ORAL DAILY
Status: DISCONTINUED | OUTPATIENT
Start: 2019-04-15 | End: 2019-05-02 | Stop reason: HOSPADM

## 2019-04-15 RX ORDER — DEXTROSE, SODIUM CHLORIDE, AND POTASSIUM CHLORIDE 5; .45; .15 G/100ML; G/100ML; G/100ML
100 INJECTION INTRAVENOUS CONTINUOUS
Status: DISCONTINUED | OUTPATIENT
Start: 2019-04-15 | End: 2019-04-16

## 2019-04-15 RX ORDER — HYDROMORPHONE HYDROCHLORIDE 1 MG/ML
1 INJECTION, SOLUTION INTRAMUSCULAR; INTRAVENOUS; SUBCUTANEOUS
Status: DISCONTINUED | OUTPATIENT
Start: 2019-04-15 | End: 2019-04-29

## 2019-04-15 RX ORDER — PANTOPRAZOLE SODIUM 40 MG/1
40 TABLET, DELAYED RELEASE ORAL
Status: DISCONTINUED | OUTPATIENT
Start: 2019-04-16 | End: 2019-05-02 | Stop reason: HOSPADM

## 2019-04-15 RX ORDER — CITALOPRAM 20 MG/1
10 TABLET, FILM COATED ORAL EVERY EVENING
Status: DISCONTINUED | OUTPATIENT
Start: 2019-04-15 | End: 2019-05-02 | Stop reason: HOSPADM

## 2019-04-15 RX ADMIN — DIPHENHYDRAMINE HYDROCHLORIDE 25 MG: 25 CAPSULE ORAL at 09:06

## 2019-04-15 RX ADMIN — HYDROMORPHONE HYDROCHLORIDE 1 MG: 1 INJECTION, SOLUTION INTRAMUSCULAR; INTRAVENOUS; SUBCUTANEOUS at 13:23

## 2019-04-15 RX ADMIN — DEXTROSE MONOHYDRATE, SODIUM CHLORIDE, AND POTASSIUM CHLORIDE 100 ML/HR: 50; 4.5; 1.49 INJECTION, SOLUTION INTRAVENOUS at 23:58

## 2019-04-15 RX ADMIN — DIPHENHYDRAMINE HYDROCHLORIDE 25 MG: 25 CAPSULE ORAL at 17:50

## 2019-04-15 RX ADMIN — HYDROMORPHONE HYDROCHLORIDE 1 MG: 1 INJECTION, SOLUTION INTRAMUSCULAR; INTRAVENOUS; SUBCUTANEOUS at 09:07

## 2019-04-15 RX ADMIN — ONDANSETRON 4 MG: 4 TABLET, ORALLY DISINTEGRATING ORAL at 13:23

## 2019-04-15 RX ADMIN — HYDROMORPHONE HYDROCHLORIDE 1 MG: 1 INJECTION, SOLUTION INTRAMUSCULAR; INTRAVENOUS; SUBCUTANEOUS at 22:04

## 2019-04-15 RX ADMIN — DIPHENHYDRAMINE HYDROCHLORIDE 25 MG: 25 CAPSULE ORAL at 22:04

## 2019-04-15 RX ADMIN — HYDROMORPHONE HYDROCHLORIDE 1 MG: 1 INJECTION, SOLUTION INTRAMUSCULAR; INTRAVENOUS; SUBCUTANEOUS at 17:50

## 2019-04-15 RX ADMIN — ONDANSETRON 4 MG: 4 TABLET, ORALLY DISINTEGRATING ORAL at 22:04

## 2019-04-15 RX ADMIN — DIPHENHYDRAMINE HYDROCHLORIDE 25 MG: 25 CAPSULE ORAL at 13:23

## 2019-04-15 RX ADMIN — ACETAMINOPHEN 650 MG: 325 TABLET ORAL at 19:59

## 2019-04-15 RX ADMIN — ENOXAPARIN SODIUM 40 MG: 40 INJECTION SUBCUTANEOUS at 09:05

## 2019-04-15 RX ADMIN — DOCUSATE SODIUM 100 MG: 100 CAPSULE, LIQUID FILLED ORAL at 09:05

## 2019-04-15 RX ADMIN — SODIUM CHLORIDE 125 ML/HR: 900 INJECTION, SOLUTION INTRAVENOUS at 15:59

## 2019-04-15 RX ADMIN — FOLIC ACID 1 MG: 1 TABLET ORAL at 09:05

## 2019-04-15 RX ADMIN — ONDANSETRON 4 MG: 4 TABLET, ORALLY DISINTEGRATING ORAL at 00:54

## 2019-04-15 RX ADMIN — DOCUSATE SODIUM 100 MG: 100 CAPSULE, LIQUID FILLED ORAL at 17:48

## 2019-04-15 RX ADMIN — ONDANSETRON 4 MG: 4 TABLET, ORALLY DISINTEGRATING ORAL at 17:50

## 2019-04-15 RX ADMIN — DIPHENHYDRAMINE HYDROCHLORIDE 25 MG: 25 CAPSULE ORAL at 04:54

## 2019-04-15 RX ADMIN — ONDANSETRON 4 MG: 4 TABLET, ORALLY DISINTEGRATING ORAL at 04:54

## 2019-04-15 RX ADMIN — SODIUM CHLORIDE 125 ML/HR: 900 INJECTION, SOLUTION INTRAVENOUS at 07:25

## 2019-04-15 RX ADMIN — DIPHENHYDRAMINE HYDROCHLORIDE 25 MG: 25 CAPSULE ORAL at 00:54

## 2019-04-15 RX ADMIN — CITALOPRAM HYDROBROMIDE 10 MG: 20 TABLET ORAL at 17:47

## 2019-04-15 RX ADMIN — HYDROMORPHONE HYDROCHLORIDE 1 MG: 1 INJECTION, SOLUTION INTRAMUSCULAR; INTRAVENOUS; SUBCUTANEOUS at 04:54

## 2019-04-15 RX ADMIN — ONDANSETRON 4 MG: 4 TABLET, ORALLY DISINTEGRATING ORAL at 09:06

## 2019-04-15 RX ADMIN — HYDROMORPHONE HYDROCHLORIDE 1 MG: 1 INJECTION, SOLUTION INTRAMUSCULAR; INTRAVENOUS; SUBCUTANEOUS at 00:54

## 2019-04-15 NOTE — PROGRESS NOTES
Oncology End of Shift Note Bedside shift change report given to Yolande Delvalle RN (incoming nurse) by Tayler Allen (outgoing nurse) on [facility]. Report included the following information SBAR, Kardex and MAR. Shift Summary:  
Patient tolerated care well throughout shift. Patient complained of pain, pain assessment completed (see MAR). Patient informed on all medications given. Issues for Physician to Address:   
 
Patient on Cardiac Monitoring? [x] Yes 
[] No 
 
Rhythm: SV rhythm Shift Events Tayler Allen

## 2019-04-15 NOTE — PROGRESS NOTES
Oncology End of Shift Note Bedside shift change report given to J Carlos Penny RN (incoming nurse) by Severiano Parody (outgoing nurse) on [facility]. Report included the following information SBAR, Kardex, Intake/Output and MAR. Shift Summary: No changes in patient condition this shift. Patient wants PRN pain meds scheduled. States pain is 8 but shows no signs of extreme pain. Issues for Physician to Address:   
 
Patient on Cardiac Monitoring? [x] Yes 
[] No 
 
Rhythm: Telemetry: normal sinus rhythm Shift Events Severiano Parody

## 2019-04-16 LAB
BASOPHILS # BLD: 0 K/UL (ref 0–0.1)
BASOPHILS NFR BLD: 0 % (ref 0–1)
BLASTS NFR BLD MANUAL: 0 %
DIFFERENTIAL METHOD BLD: ABNORMAL
EOSINOPHIL # BLD: 0.4 K/UL (ref 0–0.4)
EOSINOPHIL NFR BLD: 4 % (ref 0–7)
ERYTHROCYTE [DISTWIDTH] IN BLOOD BY AUTOMATED COUNT: 17.5 % (ref 11.5–14.5)
HCT VFR BLD AUTO: 27.2 % (ref 35–47)
HGB BLD-MCNC: 9.4 G/DL (ref 11.5–16)
IMM GRANULOCYTES # BLD AUTO: 0 K/UL
IMM GRANULOCYTES NFR BLD AUTO: 0 %
LYMPHOCYTES # BLD: 5.9 K/UL (ref 0.8–3.5)
LYMPHOCYTES NFR BLD: 54 % (ref 12–49)
MCH RBC QN AUTO: 31.3 PG (ref 26–34)
MCHC RBC AUTO-ENTMCNC: 34.6 G/DL (ref 30–36.5)
MCV RBC AUTO: 90.7 FL (ref 80–99)
METAMYELOCYTES NFR BLD MANUAL: 0 %
MONOCYTES # BLD: 0.6 K/UL (ref 0–1)
MONOCYTES NFR BLD: 6 % (ref 5–13)
MYELOCYTES NFR BLD MANUAL: 0 %
NEUTS BAND NFR BLD MANUAL: 0 % (ref 0–6)
NEUTS SEG # BLD: 3.9 K/UL (ref 1.8–8)
NEUTS SEG NFR BLD: 36 % (ref 32–75)
NRBC # BLD: 0.05 K/UL (ref 0–0.01)
NRBC BLD-RTO: 0.5 PER 100 WBC
OTHER CELLS NFR BLD MANUAL: 0 %
PLATELET # BLD AUTO: 277 K/UL (ref 150–400)
PMV BLD AUTO: 10.6 FL (ref 8.9–12.9)
PROMYELOCYTES NFR BLD MANUAL: 0 %
RBC # BLD AUTO: 3 M/UL (ref 3.8–5.2)
RBC MORPH BLD: ABNORMAL
RBC MORPH BLD: ABNORMAL
WBC # BLD AUTO: 10.8 K/UL (ref 3.6–11)

## 2019-04-16 PROCEDURE — 74011250637 HC RX REV CODE- 250/637: Performed by: INTERNAL MEDICINE

## 2019-04-16 PROCEDURE — 85027 COMPLETE CBC AUTOMATED: CPT

## 2019-04-16 PROCEDURE — 65660000000 HC RM CCU STEPDOWN

## 2019-04-16 PROCEDURE — 74011250636 HC RX REV CODE- 250/636: Performed by: INTERNAL MEDICINE

## 2019-04-16 PROCEDURE — 94761 N-INVAS EAR/PLS OXIMETRY MLT: CPT

## 2019-04-16 PROCEDURE — 36415 COLL VENOUS BLD VENIPUNCTURE: CPT

## 2019-04-16 PROCEDURE — 94762 N-INVAS EAR/PLS OXIMTRY CONT: CPT

## 2019-04-16 RX ORDER — FENTANYL 75 UG/H
1 PATCH TRANSDERMAL
Status: DISCONTINUED | OUTPATIENT
Start: 2019-04-17 | End: 2019-05-02 | Stop reason: HOSPADM

## 2019-04-16 RX ORDER — DEXTROSE, SODIUM CHLORIDE, AND POTASSIUM CHLORIDE 5; .45; .22 G/100ML; G/100ML; G/100ML
INJECTION INTRAVENOUS CONTINUOUS
Status: DISCONTINUED | OUTPATIENT
Start: 2019-04-16 | End: 2019-04-18

## 2019-04-16 RX ADMIN — DIPHENHYDRAMINE HYDROCHLORIDE 25 MG: 25 CAPSULE ORAL at 10:08

## 2019-04-16 RX ADMIN — DIPHENHYDRAMINE HYDROCHLORIDE 25 MG: 25 CAPSULE ORAL at 06:09

## 2019-04-16 RX ADMIN — PANTOPRAZOLE SODIUM 40 MG: 40 TABLET, DELAYED RELEASE ORAL at 08:33

## 2019-04-16 RX ADMIN — FOLIC ACID 1 MG: 1 TABLET ORAL at 08:33

## 2019-04-16 RX ADMIN — DEXTROSE MONOHYDRATE, SODIUM CHLORIDE, AND POTASSIUM CHLORIDE 100 ML/HR: 50; 4.5; 1.49 INJECTION, SOLUTION INTRAVENOUS at 20:30

## 2019-04-16 RX ADMIN — HYDROMORPHONE HYDROCHLORIDE 1 MG: 1 INJECTION, SOLUTION INTRAMUSCULAR; INTRAVENOUS; SUBCUTANEOUS at 18:26

## 2019-04-16 RX ADMIN — DOCUSATE SODIUM 100 MG: 100 CAPSULE, LIQUID FILLED ORAL at 08:34

## 2019-04-16 RX ADMIN — HYDROMORPHONE HYDROCHLORIDE 1 MG: 1 INJECTION, SOLUTION INTRAMUSCULAR; INTRAVENOUS; SUBCUTANEOUS at 14:08

## 2019-04-16 RX ADMIN — DIPHENHYDRAMINE HYDROCHLORIDE 25 MG: 25 CAPSULE ORAL at 18:27

## 2019-04-16 RX ADMIN — DIPHENHYDRAMINE HYDROCHLORIDE 25 MG: 25 CAPSULE ORAL at 02:15

## 2019-04-16 RX ADMIN — HYDROMORPHONE HYDROCHLORIDE 1 MG: 1 INJECTION, SOLUTION INTRAMUSCULAR; INTRAVENOUS; SUBCUTANEOUS at 10:08

## 2019-04-16 RX ADMIN — HYDROMORPHONE HYDROCHLORIDE 1 MG: 1 INJECTION, SOLUTION INTRAMUSCULAR; INTRAVENOUS; SUBCUTANEOUS at 22:35

## 2019-04-16 RX ADMIN — ONDANSETRON 4 MG: 4 TABLET, ORALLY DISINTEGRATING ORAL at 22:35

## 2019-04-16 RX ADMIN — ONDANSETRON 4 MG: 4 TABLET, ORALLY DISINTEGRATING ORAL at 10:08

## 2019-04-16 RX ADMIN — DIPHENHYDRAMINE HYDROCHLORIDE 25 MG: 25 CAPSULE ORAL at 14:08

## 2019-04-16 RX ADMIN — ONDANSETRON 4 MG: 4 TABLET, ORALLY DISINTEGRATING ORAL at 14:08

## 2019-04-16 RX ADMIN — CITALOPRAM HYDROBROMIDE 10 MG: 20 TABLET ORAL at 17:09

## 2019-04-16 RX ADMIN — DEXTROSE MONOHYDRATE, SODIUM CHLORIDE, AND POTASSIUM CHLORIDE: 50; 4.5; 2.24 INJECTION, SOLUTION INTRAVENOUS at 22:35

## 2019-04-16 RX ADMIN — DOCUSATE SODIUM 100 MG: 100 CAPSULE, LIQUID FILLED ORAL at 17:09

## 2019-04-16 RX ADMIN — ONDANSETRON 4 MG: 4 TABLET, ORALLY DISINTEGRATING ORAL at 02:15

## 2019-04-16 RX ADMIN — HYDROMORPHONE HYDROCHLORIDE 1 MG: 1 INJECTION, SOLUTION INTRAMUSCULAR; INTRAVENOUS; SUBCUTANEOUS at 06:09

## 2019-04-16 RX ADMIN — ENOXAPARIN SODIUM 40 MG: 40 INJECTION SUBCUTANEOUS at 08:34

## 2019-04-16 RX ADMIN — ONDANSETRON 4 MG: 4 TABLET, ORALLY DISINTEGRATING ORAL at 18:27

## 2019-04-16 RX ADMIN — DIPHENHYDRAMINE HYDROCHLORIDE 25 MG: 25 CAPSULE ORAL at 22:35

## 2019-04-16 RX ADMIN — HYDROMORPHONE HYDROCHLORIDE 1 MG: 1 INJECTION, SOLUTION INTRAMUSCULAR; INTRAVENOUS; SUBCUTANEOUS at 02:15

## 2019-04-16 RX ADMIN — ONDANSETRON 4 MG: 4 TABLET, ORALLY DISINTEGRATING ORAL at 06:09

## 2019-04-16 NOTE — PROGRESS NOTES
Oncology End of Shift Note Bedside shift change report given to Josey Yates RN (incoming nurse) by Delfina Nowak (outgoing nurse) on [facility]. Report included the following information SBAR, Kardex and MAR. Shift Summary:  
Patient tolerated care well. PRN meds given for pain (see MAR). No new complaints. Continuous pulse ox placed per MD. Education provided on medications. Issues for Physician to Address:   
 
Patient on Cardiac Monitoring? [x] Yes 
[] No 
 
Rhythm: Telemetry: normal sinus rhythm Shift Events Delfina Nowak

## 2019-04-16 NOTE — PROGRESS NOTES
General Daily Progress Note Admit Date: 4/14/2019 Subjective:  
 
Patient c/o pain. Current Facility-Administered Medications Medication Dose Route Frequency  citalopram (CELEXA) tablet 10 mg  10 mg Oral QPM  
 folic acid (FOLVITE) tablet 1 mg  1 mg Oral DAILY  [START ON 4/16/2019] pantoprazole (PROTONIX) tablet 40 mg  40 mg Oral ACB  
 HYDROmorphone (PF) (DILAUDID) injection 1 mg  1 mg IntraVENous Q3H PRN  
 dextrose 5 % - 0.45% NaCl 1,000 mL with potassium chloride 20 mEq infusion   IntraVENous CONTINUOUS  
 acetaminophen (TYLENOL) tablet 650 mg  650 mg Oral Q4H PRN  
 HYDROcodone-acetaminophen (NORCO)  mg tablet 1 Tab  1 Tab Oral Q4H PRN  
 naloxone (NARCAN) injection 0.4 mg  0.4 mg IntraVENous PRN  
 diphenhydrAMINE (BENADRYL) capsule 25 mg  25 mg Oral Q4H PRN  
 ondansetron (ZOFRAN ODT) tablet 4 mg  4 mg Oral Q4H PRN  
 docusate sodium (COLACE) capsule 100 mg  100 mg Oral BID  enoxaparin (LOVENOX) injection 40 mg  40 mg SubCUTAneous Q24H Review of Systems A comprehensive review of systems was negative. Objective:  
 
Patient Vitals for the past 24 hrs: 
 BP Temp Pulse Resp SpO2  
04/15/19 1954 120/70 98.8 °F (37.1 °C) 89 16 100 % 04/15/19 1505 134/74 98.8 °F (37.1 °C) 85 16 100 % 04/15/19 1123 153/85 98.9 °F (37.2 °C) 88 16 100 % 04/15/19 0810 160/90 98.6 °F (37 °C) 83 16 100 % 04/15/19 0306 136/77 98.2 °F (36.8 °C) 84 16 98 % 04/14/19 2354 138/75 98.7 °F (37.1 °C) 81 18 100 % No intake/output data recorded. 04/14 0701 - 04/15 1900 In: 1000 [I.V.:1000] Out: - Physical Exam:  
Visit Vitals /70 (BP 1 Location: Right arm, BP Patient Position: At rest) Pulse 89 Temp 98.8 °F (37.1 °C) Resp 16 Ht 5' 11\" (1.803 m) Wt 189 lb 13.1 oz (86.1 kg) SpO2 100% BMI 26.47 kg/m² General appearance: alert, cooperative, no distress, appears stated age Neck: supple, symmetrical, trachea midline, no adenopathy, thyroid: not enlarged, symmetric, no tenderness/mass/nodules, no carotid bruit and no JVD Lungs: clear to auscultation bilaterally Heart: regular rate and rhythm, S1, S2 normal, no murmur, click, rub or gallop Abdomen: soft, non-tender. Bowel sounds normal. No masses,  no organomegaly Extremities: extremities normal, atraumatic, no cyanosis or edema Data Review No results found for this or any previous visit (from the past 24 hour(s)). Assessment:  
 
Active Problems: 
  Sickle cell pain crisis ([facility] [address] (9/8/2018) Plan: 1. Cont treatment of painful sickle crisis.

## 2019-04-16 NOTE — PROGRESS NOTES
Oncology End of Shift Note Bedside shift change report given to SAMIR Patiño (incoming nurse) by Pauline Mon (outgoing nurse) on [facility]. Report included the following information SBAR, Kardex, Intake/Output and MAR. Shift Summary: Pt remained stable during shift. PRN meds x3. No major complaints. Labs drawn. Issues for Physician to Address:   
 
Patient on Cardiac Monitoring? [x] Yes 
[] No 
 
Rhythm: Telemetry: normal sinus rhythm Pauline Mon

## 2019-04-17 PROCEDURE — 74011250636 HC RX REV CODE- 250/636: Performed by: INTERNAL MEDICINE

## 2019-04-17 PROCEDURE — 74011250637 HC RX REV CODE- 250/637: Performed by: INTERNAL MEDICINE

## 2019-04-17 PROCEDURE — 65660000000 HC RM CCU STEPDOWN

## 2019-04-17 RX ADMIN — ONDANSETRON 4 MG: 4 TABLET, ORALLY DISINTEGRATING ORAL at 10:35

## 2019-04-17 RX ADMIN — DOCUSATE SODIUM 100 MG: 100 CAPSULE, LIQUID FILLED ORAL at 08:22

## 2019-04-17 RX ADMIN — HYDROMORPHONE HYDROCHLORIDE 1 MG: 1 INJECTION, SOLUTION INTRAMUSCULAR; INTRAVENOUS; SUBCUTANEOUS at 02:50

## 2019-04-17 RX ADMIN — DIPHENHYDRAMINE HYDROCHLORIDE 25 MG: 25 CAPSULE ORAL at 16:53

## 2019-04-17 RX ADMIN — DIPHENHYDRAMINE HYDROCHLORIDE 25 MG: 25 CAPSULE ORAL at 06:49

## 2019-04-17 RX ADMIN — ONDANSETRON 4 MG: 4 TABLET, ORALLY DISINTEGRATING ORAL at 20:56

## 2019-04-17 RX ADMIN — ONDANSETRON 4 MG: 4 TABLET, ORALLY DISINTEGRATING ORAL at 06:49

## 2019-04-17 RX ADMIN — FOLIC ACID 1 MG: 1 TABLET ORAL at 08:22

## 2019-04-17 RX ADMIN — DEXTROSE MONOHYDRATE, SODIUM CHLORIDE, AND POTASSIUM CHLORIDE: 50; 4.5; 2.24 INJECTION, SOLUTION INTRAVENOUS at 19:01

## 2019-04-17 RX ADMIN — CITALOPRAM HYDROBROMIDE 10 MG: 20 TABLET ORAL at 17:40

## 2019-04-17 RX ADMIN — HYDROMORPHONE HYDROCHLORIDE 1 MG: 1 INJECTION, SOLUTION INTRAMUSCULAR; INTRAVENOUS; SUBCUTANEOUS at 06:49

## 2019-04-17 RX ADMIN — DIPHENHYDRAMINE HYDROCHLORIDE 25 MG: 25 CAPSULE ORAL at 20:56

## 2019-04-17 RX ADMIN — HYDROMORPHONE HYDROCHLORIDE 1 MG: 1 INJECTION, SOLUTION INTRAMUSCULAR; INTRAVENOUS; SUBCUTANEOUS at 16:53

## 2019-04-17 RX ADMIN — HYDROMORPHONE HYDROCHLORIDE 1 MG: 1 INJECTION, SOLUTION INTRAMUSCULAR; INTRAVENOUS; SUBCUTANEOUS at 10:35

## 2019-04-17 RX ADMIN — DIPHENHYDRAMINE HYDROCHLORIDE 25 MG: 25 CAPSULE ORAL at 02:50

## 2019-04-17 RX ADMIN — DIPHENHYDRAMINE HYDROCHLORIDE 25 MG: 25 CAPSULE ORAL at 10:35

## 2019-04-17 RX ADMIN — DEXTROSE MONOHYDRATE, SODIUM CHLORIDE, AND POTASSIUM CHLORIDE: 50; 4.5; 2.24 INJECTION, SOLUTION INTRAVENOUS at 08:21

## 2019-04-17 RX ADMIN — ONDANSETRON 4 MG: 4 TABLET, ORALLY DISINTEGRATING ORAL at 02:50

## 2019-04-17 RX ADMIN — ENOXAPARIN SODIUM 40 MG: 40 INJECTION SUBCUTANEOUS at 08:22

## 2019-04-17 RX ADMIN — HYDROMORPHONE HYDROCHLORIDE 1 MG: 1 INJECTION, SOLUTION INTRAMUSCULAR; INTRAVENOUS; SUBCUTANEOUS at 20:57

## 2019-04-17 RX ADMIN — PANTOPRAZOLE SODIUM 40 MG: 40 TABLET, DELAYED RELEASE ORAL at 08:22

## 2019-04-17 RX ADMIN — DOCUSATE SODIUM 100 MG: 100 CAPSULE, LIQUID FILLED ORAL at 17:40

## 2019-04-17 RX ADMIN — ONDANSETRON 4 MG: 4 TABLET, ORALLY DISINTEGRATING ORAL at 16:53

## 2019-04-17 NOTE — PROGRESS NOTES
Problem: Falls - Risk of 
Goal: *Absence of Falls Description Document Kentongustavo Caren Fall Risk and appropriate interventions in the flowsheet. Outcome: Progressing Towards Goal 
  
Problem: Patient Education: Go to Patient Education Activity Goal: Patient/Family Education Outcome: Progressing Towards Goal

## 2019-04-17 NOTE — PROGRESS NOTES
General Daily Progress Note Admit Date: 4/14/2019 Subjective:  
 
Patient continues to complain of pain. Current Facility-Administered Medications Medication Dose Route Frequency  dextrose 5 % - 0.45% NaCl 1,000 mL with potassium chloride 30 mEq infusion   IntraVENous CONTINUOUS  
 citalopram (CELEXA) tablet 10 mg  10 mg Oral QPM  
 folic acid (FOLVITE) tablet 1 mg  1 mg Oral DAILY  pantoprazole (PROTONIX) tablet 40 mg  40 mg Oral ACB  
 HYDROmorphone (PF) (DILAUDID) injection 1 mg  1 mg IntraVENous Q3H PRN  
 acetaminophen (TYLENOL) tablet 650 mg  650 mg Oral Q4H PRN  
 HYDROcodone-acetaminophen (NORCO)  mg tablet 1 Tab  1 Tab Oral Q4H PRN  
 naloxone (NARCAN) injection 0.4 mg  0.4 mg IntraVENous PRN  
 diphenhydrAMINE (BENADRYL) capsule 25 mg  25 mg Oral Q4H PRN  
 ondansetron (ZOFRAN ODT) tablet 4 mg  4 mg Oral Q4H PRN  
 docusate sodium (COLACE) capsule 100 mg  100 mg Oral BID  enoxaparin (LOVENOX) injection 40 mg  40 mg SubCUTAneous Q24H Review of Systems A comprehensive review of systems was negative. Objective:  
 
Patient Vitals for the past 24 hrs: 
 BP Temp Pulse Resp SpO2  
04/16/19 1940 145/81 99 °F (37.2 °C) 90 18 97 % 04/16/19 1550 128/75 98.8 °F (37.1 °C) 82 18 98 % 04/16/19 1130 147/85 98.7 °F (37.1 °C) 76 18 100 % 04/16/19 0752 137/76 98.2 °F (36.8 °C) 77 18 98 % 04/16/19 0234 124/75 98.5 °F (36.9 °C) 72 16 98 % 04/15/19 2256 138/88 98.6 °F (37 °C) 78 16 99 % No intake/output data recorded. 04/15 0701 - 04/16 1900 In: 1228.3 [I.V.:1228.3] Out: - Physical Exam:  
Visit Vitals /81 (BP 1 Location: Left arm, BP Patient Position: Sitting) Pulse 90 Temp 99 °F (37.2 °C) Resp 18 Ht 5' 11\" (1.803 m) Wt 189 lb 13.1 oz (86.1 kg) SpO2 97% BMI 26.47 kg/m² General appearance: alert, cooperative, no distress, appears stated age Neck: supple, symmetrical, trachea midline, no adenopathy, thyroid: not enlarged, symmetric, no tenderness/mass/nodules, no carotid bruit and no JVD Lungs: clear to auscultation bilaterally Heart: regular rate and rhythm, S1, S2 normal, no murmur, click, rub or gallop Abdomen: soft, non-tender. Bowel sounds normal. No masses,  no organomegaly Extremities: extremities normal, atraumatic, no cyanosis or edema Data Review Recent Results (from the past 24 hour(s)) CBC WITH MANUAL DIFF Collection Time: 04/16/19  2:23 AM  
Result Value Ref Range WBC 10.8 3.6 - 11.0 K/uL  
 RBC 3.00 (L) 3.80 - 5.20 M/uL HGB 9.4 (L) 11.5 - 16.0 g/dL HCT 27.2 (L) 35.0 - 47.0 % MCV 90.7 80.0 - 99.0 FL  
 MCH 31.3 26.0 - 34.0 PG  
 MCHC 34.6 30.0 - 36.5 g/dL  
 RDW 17.5 (H) 11.5 - 14.5 % PLATELET 601 541 - 609 K/uL MPV 10.6 8.9 - 12.9 FL  
 NRBC 0.5 (H) 0  WBC ABSOLUTE NRBC 0.05 (H) 0.00 - 0.01 K/uL NEUTROPHILS 36 32 - 75 % BAND NEUTROPHILS 0 0 - 6 % LYMPHOCYTES 54 (H) 12 - 49 % MONOCYTES 6 5 - 13 % EOSINOPHILS 4 0 - 7 % BASOPHILS 0 0 - 1 % METAMYELOCYTES 0 0 % MYELOCYTES 0 0 % PROMYELOCYTES 0 0 % BLASTS 0 0 % OTHER CELL 0 0 IMMATURE GRANULOCYTES 0 %  
 ABS. NEUTROPHILS 3.9 1.8 - 8.0 K/UL  
 ABS. LYMPHOCYTES 5.9 (H) 0.8 - 3.5 K/UL  
 ABS. MONOCYTES 0.6 0.0 - 1.0 K/UL  
 ABS. EOSINOPHILS 0.4 0.0 - 0.4 K/UL  
 ABS. BASOPHILS 0.0 0.0 - 0.1 K/UL  
 ABS. IMM. GRANS. 0.0 K/UL  
 DF MANUAL    
 RBC COMMENTS ANISOCYTOSIS 1+ 
    
 RBC COMMENTS TARGET CELLS 1+ Assessment:  
 
Active Problems: 
  Sickle cell pain crisis ([facility] [address] (9/8/2018) Plan: 1. Continue treatment of painful sickle crises. No major change as of now.

## 2019-04-17 NOTE — PROGRESS NOTES
Problem: Falls - Risk of 
Goal: *Absence of Falls Description Document Ian Robb Fall Risk and appropriate interventions in the flowsheet. Outcome: Progressing Towards Goal 
Note:  
Fall Risk Interventions: 
  
 
  
 
Medication Interventions: Evaluate medications/consider consulting pharmacy, Teach patient to arise slowly

## 2019-04-17 NOTE — PROGRESS NOTES
Oncology End of Shift Note Bedside shift change report given to Davis Ayala RN (incoming nurse) by Raji Martin (outgoing nurse) on [facility]. Report included the following information SBAR, Kardex, Intake/Output and MAR. Shift Summary: Pt remained stable during shift. PRN medications x3. IV fluids switched to d5 1/2 NS with 30mEq of KCL. No labs were ordered. Should BMP be ordered? Issues for Physician to Address:  Does pt need new BMP? Patient on Cardiac Monitoring? [x] Yes 
[] No 
 
Rhythm: Telemetry: normal sinus rhythm Raji Martin

## 2019-04-17 NOTE — PROGRESS NOTES
Oncology End of Shift Note Bedside shift change report given to Carmen Tejeda (incoming nurse) by Edelmira Quintana RN (outgoing nurse) on [facility]. Report included the following information SBAR. Shift Summary:  
 
 
Issues for Physician to Addres Patient on Cardiac Monitoring?    
[x] Yes 
[] No 
 
Rhythm: NS  
 
 
 
 
 
 
Deon Sher RN

## 2019-04-18 LAB
ALBUMIN SERPL-MCNC: 3.5 G/DL (ref 3.5–5)
ALBUMIN/GLOB SERPL: 0.8 {RATIO} (ref 1.1–2.2)
ALP SERPL-CCNC: 113 U/L (ref 45–117)
ALT SERPL-CCNC: 18 U/L (ref 12–78)
ANION GAP SERPL CALC-SCNC: 2 MMOL/L (ref 5–15)
AST SERPL-CCNC: 36 U/L (ref 15–37)
BASOPHILS # BLD: 0 K/UL (ref 0–0.1)
BASOPHILS NFR BLD: 0 % (ref 0–1)
BILIRUB SERPL-MCNC: 1.3 MG/DL (ref 0.2–1)
BLASTS NFR BLD MANUAL: 0 %
BUN SERPL-MCNC: 9 MG/DL (ref 6–20)
BUN/CREAT SERPL: 13 (ref 12–20)
CALCIUM SERPL-MCNC: 8.4 MG/DL (ref 8.5–10.1)
CHLORIDE SERPL-SCNC: 108 MMOL/L (ref 97–108)
CO2 SERPL-SCNC: 28 MMOL/L (ref 21–32)
CREAT SERPL-MCNC: 0.7 MG/DL (ref 0.55–1.02)
DIFFERENTIAL METHOD BLD: ABNORMAL
EOSINOPHIL # BLD: 0.3 K/UL (ref 0–0.4)
EOSINOPHIL NFR BLD: 3 % (ref 0–7)
ERYTHROCYTE [DISTWIDTH] IN BLOOD BY AUTOMATED COUNT: 17.3 % (ref 11.5–14.5)
GLOBULIN SER CALC-MCNC: 4.4 G/DL (ref 2–4)
GLUCOSE SERPL-MCNC: 81 MG/DL (ref 65–100)
HCT VFR BLD AUTO: 23.9 % (ref 35–47)
HGB BLD-MCNC: 8.3 G/DL (ref 11.5–16)
IMM GRANULOCYTES # BLD AUTO: 0 K/UL
IMM GRANULOCYTES NFR BLD AUTO: 0 %
LYMPHOCYTES # BLD: 6.1 K/UL (ref 0.8–3.5)
LYMPHOCYTES NFR BLD: 55 % (ref 12–49)
MCH RBC QN AUTO: 31.8 PG (ref 26–34)
MCHC RBC AUTO-ENTMCNC: 34.7 G/DL (ref 30–36.5)
MCV RBC AUTO: 91.6 FL (ref 80–99)
METAMYELOCYTES NFR BLD MANUAL: 1 %
MONOCYTES # BLD: 0.4 K/UL (ref 0–1)
MONOCYTES NFR BLD: 4 % (ref 5–13)
MYELOCYTES NFR BLD MANUAL: 0 %
NEUTS BAND NFR BLD MANUAL: 0 % (ref 0–6)
NEUTS SEG # BLD: 4.1 K/UL (ref 1.8–8)
NEUTS SEG NFR BLD: 37 % (ref 32–75)
NRBC # BLD: 0.08 K/UL (ref 0–0.01)
NRBC BLD-RTO: 0.7 PER 100 WBC
OTHER CELLS NFR BLD MANUAL: 0 %
PLATELET # BLD AUTO: 242 K/UL (ref 150–400)
PMV BLD AUTO: 11.4 FL (ref 8.9–12.9)
POTASSIUM SERPL-SCNC: 4.6 MMOL/L (ref 3.5–5.1)
PROMYELOCYTES NFR BLD MANUAL: 0 %
PROT SERPL-MCNC: 7.9 G/DL (ref 6.4–8.2)
RBC # BLD AUTO: 2.61 M/UL (ref 3.8–5.2)
RBC MORPH BLD: ABNORMAL
RBC MORPH BLD: ABNORMAL
SODIUM SERPL-SCNC: 138 MMOL/L (ref 136–145)
WBC # BLD AUTO: 11 K/UL (ref 3.6–11)

## 2019-04-18 PROCEDURE — 74011250637 HC RX REV CODE- 250/637: Performed by: INTERNAL MEDICINE

## 2019-04-18 PROCEDURE — 85027 COMPLETE CBC AUTOMATED: CPT

## 2019-04-18 PROCEDURE — 74011250636 HC RX REV CODE- 250/636: Performed by: INTERNAL MEDICINE

## 2019-04-18 PROCEDURE — 80053 COMPREHEN METABOLIC PANEL: CPT

## 2019-04-18 PROCEDURE — 65660000000 HC RM CCU STEPDOWN

## 2019-04-18 PROCEDURE — 36415 COLL VENOUS BLD VENIPUNCTURE: CPT

## 2019-04-18 RX ORDER — DEXTROSE, SODIUM CHLORIDE, AND POTASSIUM CHLORIDE 5; .45; .15 G/100ML; G/100ML; G/100ML
25 INJECTION INTRAVENOUS CONTINUOUS
Status: DISCONTINUED | OUTPATIENT
Start: 2019-04-18 | End: 2019-05-02 | Stop reason: HOSPADM

## 2019-04-18 RX ADMIN — DIPHENHYDRAMINE HYDROCHLORIDE 25 MG: 25 CAPSULE ORAL at 09:26

## 2019-04-18 RX ADMIN — DOCUSATE SODIUM 100 MG: 100 CAPSULE, LIQUID FILLED ORAL at 17:10

## 2019-04-18 RX ADMIN — HYDROMORPHONE HYDROCHLORIDE 1 MG: 1 INJECTION, SOLUTION INTRAMUSCULAR; INTRAVENOUS; SUBCUTANEOUS at 09:26

## 2019-04-18 RX ADMIN — PANTOPRAZOLE SODIUM 40 MG: 40 TABLET, DELAYED RELEASE ORAL at 08:49

## 2019-04-18 RX ADMIN — DEXTROSE MONOHYDRATE, SODIUM CHLORIDE, AND POTASSIUM CHLORIDE 100 ML/HR: 50; 4.5; 1.49 INJECTION, SOLUTION INTRAVENOUS at 21:23

## 2019-04-18 RX ADMIN — FOLIC ACID 1 MG: 1 TABLET ORAL at 08:49

## 2019-04-18 RX ADMIN — HYDROMORPHONE HYDROCHLORIDE 1 MG: 1 INJECTION, SOLUTION INTRAMUSCULAR; INTRAVENOUS; SUBCUTANEOUS at 01:10

## 2019-04-18 RX ADMIN — HYDROMORPHONE HYDROCHLORIDE 1 MG: 1 INJECTION, SOLUTION INTRAMUSCULAR; INTRAVENOUS; SUBCUTANEOUS at 13:30

## 2019-04-18 RX ADMIN — DIPHENHYDRAMINE HYDROCHLORIDE 25 MG: 25 CAPSULE ORAL at 18:05

## 2019-04-18 RX ADMIN — DIPHENHYDRAMINE HYDROCHLORIDE 25 MG: 25 CAPSULE ORAL at 05:20

## 2019-04-18 RX ADMIN — ENOXAPARIN SODIUM 40 MG: 40 INJECTION SUBCUTANEOUS at 08:49

## 2019-04-18 RX ADMIN — DIPHENHYDRAMINE HYDROCHLORIDE 25 MG: 25 CAPSULE ORAL at 22:05

## 2019-04-18 RX ADMIN — ONDANSETRON 4 MG: 4 TABLET, ORALLY DISINTEGRATING ORAL at 18:05

## 2019-04-18 RX ADMIN — HYDROMORPHONE HYDROCHLORIDE 1 MG: 1 INJECTION, SOLUTION INTRAMUSCULAR; INTRAVENOUS; SUBCUTANEOUS at 18:05

## 2019-04-18 RX ADMIN — CITALOPRAM HYDROBROMIDE 10 MG: 20 TABLET ORAL at 17:10

## 2019-04-18 RX ADMIN — DEXTROSE MONOHYDRATE, SODIUM CHLORIDE, AND POTASSIUM CHLORIDE: 50; 4.5; 2.24 INJECTION, SOLUTION INTRAVENOUS at 06:19

## 2019-04-18 RX ADMIN — DOCUSATE SODIUM 100 MG: 100 CAPSULE, LIQUID FILLED ORAL at 08:49

## 2019-04-18 RX ADMIN — DEXTROSE MONOHYDRATE, SODIUM CHLORIDE, AND POTASSIUM CHLORIDE: 50; 4.5; 2.24 INJECTION, SOLUTION INTRAVENOUS at 17:05

## 2019-04-18 RX ADMIN — ONDANSETRON 4 MG: 4 TABLET, ORALLY DISINTEGRATING ORAL at 13:30

## 2019-04-18 RX ADMIN — DIPHENHYDRAMINE HYDROCHLORIDE 25 MG: 25 CAPSULE ORAL at 13:30

## 2019-04-18 RX ADMIN — HYDROMORPHONE HYDROCHLORIDE 1 MG: 1 INJECTION, SOLUTION INTRAMUSCULAR; INTRAVENOUS; SUBCUTANEOUS at 05:20

## 2019-04-18 RX ADMIN — ONDANSETRON 4 MG: 4 TABLET, ORALLY DISINTEGRATING ORAL at 05:20

## 2019-04-18 RX ADMIN — HYDROMORPHONE HYDROCHLORIDE 1 MG: 1 INJECTION, SOLUTION INTRAMUSCULAR; INTRAVENOUS; SUBCUTANEOUS at 22:05

## 2019-04-18 RX ADMIN — DIPHENHYDRAMINE HYDROCHLORIDE 25 MG: 25 CAPSULE ORAL at 01:10

## 2019-04-18 RX ADMIN — ACETAMINOPHEN 650 MG: 325 TABLET ORAL at 19:45

## 2019-04-18 RX ADMIN — ONDANSETRON 4 MG: 4 TABLET, ORALLY DISINTEGRATING ORAL at 01:10

## 2019-04-18 RX ADMIN — ONDANSETRON 4 MG: 4 TABLET, ORALLY DISINTEGRATING ORAL at 22:05

## 2019-04-18 RX ADMIN — ONDANSETRON 4 MG: 4 TABLET, ORALLY DISINTEGRATING ORAL at 09:26

## 2019-04-18 NOTE — PROGRESS NOTES
General Daily Progress Note Admit Date: 4/14/2019 Subjective:  
 
Patient continues to complain of generalized pain Current Facility-Administered Medications Medication Dose Route Frequency  fentaNYL (DURAGESIC) 75 mcg/hr patch 1 Patch  1 Patch TransDERmal Q72H  dextrose 5% - 0.45% NaCl with KCl 30 mEq/L infusion   IntraVENous CONTINUOUS  
 citalopram (CELEXA) tablet 10 mg  10 mg Oral QPM  
 folic acid (FOLVITE) tablet 1 mg  1 mg Oral DAILY  pantoprazole (PROTONIX) tablet 40 mg  40 mg Oral ACB  
 HYDROmorphone (PF) (DILAUDID) injection 1 mg  1 mg IntraVENous Q3H PRN  
 acetaminophen (TYLENOL) tablet 650 mg  650 mg Oral Q4H PRN  
 naloxone (NARCAN) injection 0.4 mg  0.4 mg IntraVENous PRN  
 diphenhydrAMINE (BENADRYL) capsule 25 mg  25 mg Oral Q4H PRN  
 ondansetron (ZOFRAN ODT) tablet 4 mg  4 mg Oral Q4H PRN  
 docusate sodium (COLACE) capsule 100 mg  100 mg Oral BID  enoxaparin (LOVENOX) injection 40 mg  40 mg SubCUTAneous Q24H Review of Systems A comprehensive review of systems was negative. Objective:  
 
Patient Vitals for the past 24 hrs: 
 BP Temp Pulse Resp SpO2  
04/17/19 2035 132/73 98.6 °F (37 °C) 83 18 99 % 04/17/19 1503 141/78 98.9 °F (37.2 °C) 83 18 97 % 04/17/19 1122 147/86 98.9 °F (37.2 °C) 81 16 98 % 04/17/19 0756 149/82 98.6 °F (37 °C) 81 18 96 % 04/17/19 0344 126/66 98.7 °F (37.1 °C) 78 16 99 % 04/17/19 0000 123/58 98.5 °F (36.9 °C) 87 16 98 % No intake/output data recorded. 04/16 0701 - 04/17 1900 In: [address] [I.V.:1835] Out: - Physical Exam:  
Visit Vitals /73 (BP 1 Location: Left arm, BP Patient Position: Sitting) Pulse 83 Temp 98.6 °F (37 °C) Resp 18 Ht 5' 11\" (1.803 m) Wt 189 lb 13.1 oz (86.1 kg) SpO2 99% BMI 26.47 kg/m² General appearance: alert, cooperative, no distress, appears stated age Neck: supple, symmetrical, trachea midline, no adenopathy, thyroid: not enlarged, symmetric, no tenderness/mass/nodules, no carotid bruit and no JVD Lungs: clear to auscultation bilaterally Heart: regular rate and rhythm, S1, S2 normal, no murmur, click, rub or gallop Abdomen: soft, non-tender. Bowel sounds normal. No masses,  no organomegaly Extremities: extremities normal, atraumatic, no cyanosis or edema Data Review No results found for this or any previous visit (from the past 24 hour(s)). Assessment:  
 
Active Problems: 
  Sickle cell pain crisis ([facility] [address] (9/8/2018) Plan: 1. Continue treatment of painful crises. Transfusion if hemoglobin less than 7.

## 2019-04-18 NOTE — PROGRESS NOTES
Oncology End of Shift Note Bedside shift change report given SAMIR Francis (incoming nurse) by Tomás Cardona (outgoing nurse) on [facility]. Report included the following information SBAR, Kardex and MAR. Shift Summary:  
Patient tolerated care well, still has complaints of pain. Patients family present throughout shift. Issues for Physician to Address:   
 
Patient on Cardiac Monitoring? [x] Yes 
[] No 
 
Rhythm: Telemetry: normal sinus rhythm Shift Events Tomás Cardona

## 2019-04-18 NOTE — PROGRESS NOTES
Reason for Admission:    Pt was admitted on 4/14/19 d/t worsening leg pain. Reported sputtering pain over the last week and has been using Norco PRN at home. Pt is no longer on fentanyl patches d/t insurance problems. Pt has been here before for usual sickle cell pain crisis. Medical Dx: Sickle Cell Pain Crisis. HTN. Depression. RRAT Score:     19 Resources/supports as identified by patient/family:   Pt has Medicare and BC/BS as insurance. DTR, [facility] and Brother Kehinde Hawkins are very supportive. Top Challenges facing patient (as identified by patient/family and CM): Finances/Medication cost?     Pt has prescription drug coverage. In ER she stated she has had problem getting Fentayl Patches. CM asked Pt about it and pharmacy told her that she needed to have Dr. Xander Funk Prior Authorize the Patches. CM wrote note on Pt board for me to start the auth process so that when she was ready to DC Pt could get patches and to remind Pt to discuss with MD. Transportation? Pt drives but has a supportive DTR and Brother that can help to transport her if needed. Support system or lack thereof? DTR: [facility] and Brother, Kehinde Hawkins live close by and are supportive. Living arrangements? Pt lives alone in one story home with 3 JIE. Manolo Cunningham Self-care/ADLs/Cognition? Pt is alert and oriented. Pt stated she was I W ADLs. Family helps with IADLs. Pt has no DME. Pt has no HH or SNF experience. Current Advanced Directive/Advance Care Plan:  Full Code. Pt has no AMD.  NOK would be [facility]. Pt is not interested in meeting with pastoral care to do AMD in hospital.  Pt stated she would think about it but she was comfortable with DTR, [facility] making medical decisions on her behalf. Plan for utilizing home health:    Pt is open to the idea of using [place] if ordered at discharge. . Most of the time when she discharges her Pain is better controlled and HH is not needed. Likelihood of readmission: HIGH d/t Sickle Cell Crisis Pt. Transition of Care Plan: 1.  Plan is to discharge home alone when medically stable and pain is more controlled. -See if Dr. Xander Funk can work on preauthorization of Patches for Pt. 2.   Pt brother, Laurent Page can transport her home in the car. 3.  CM will need to issue Second IM letter to Pt. 4.  CM will need to make PCP appt with Dr. Xander Funk in one week. CM will continue to monitor discharge plan. Care Management Interventions PCP Verified by CM: Yes 
Palliative Care Criteria Met (RRAT>21 & CHF Dx)?: No 
Mode of Transport at Discharge: Other (see comment) Transition of Care Consult (CM Consult): Discharge Planning MyChart Signup: No 
Discharge Durable Medical Equipment: No 
Physical Therapy Consult: No 
Occupational Therapy Consult: No 
Speech Therapy Consult: No 
Current Support Network: Lives Alone, Family Lives [place] Confirm Follow Up Transport: Self Plan discussed with Pt/Family/Caregiver: Yes Freedom of Choice Offered: Yes Discharge Location Discharge Placement: Home with family assistance Gaurang Melton CM Ext [phone]

## 2019-04-18 NOTE — ROUTINE PROCESS
Bedside shift change report given to [facility] (oncoming nurse) by Bailey Tsai (offgoing nurse). Report included the following information SBAR, Kardex, MAR and Recent Results.

## 2019-04-19 PROCEDURE — 94762 N-INVAS EAR/PLS OXIMTRY CONT: CPT

## 2019-04-19 PROCEDURE — 74011250637 HC RX REV CODE- 250/637: Performed by: INTERNAL MEDICINE

## 2019-04-19 PROCEDURE — 74011250636 HC RX REV CODE- 250/636: Performed by: INTERNAL MEDICINE

## 2019-04-19 PROCEDURE — 65660000000 HC RM CCU STEPDOWN

## 2019-04-19 PROCEDURE — 94761 N-INVAS EAR/PLS OXIMETRY MLT: CPT

## 2019-04-19 RX ADMIN — ENOXAPARIN SODIUM 40 MG: 40 INJECTION SUBCUTANEOUS at 08:52

## 2019-04-19 RX ADMIN — HYDROMORPHONE HYDROCHLORIDE 1 MG: 1 INJECTION, SOLUTION INTRAMUSCULAR; INTRAVENOUS; SUBCUTANEOUS at 22:06

## 2019-04-19 RX ADMIN — DIPHENHYDRAMINE HYDROCHLORIDE 25 MG: 25 CAPSULE ORAL at 02:06

## 2019-04-19 RX ADMIN — ONDANSETRON 4 MG: 4 TABLET, ORALLY DISINTEGRATING ORAL at 10:15

## 2019-04-19 RX ADMIN — DIPHENHYDRAMINE HYDROCHLORIDE 25 MG: 25 CAPSULE ORAL at 10:15

## 2019-04-19 RX ADMIN — DOCUSATE SODIUM 100 MG: 100 CAPSULE, LIQUID FILLED ORAL at 18:02

## 2019-04-19 RX ADMIN — HYDROMORPHONE HYDROCHLORIDE 1 MG: 1 INJECTION, SOLUTION INTRAMUSCULAR; INTRAVENOUS; SUBCUTANEOUS at 02:06

## 2019-04-19 RX ADMIN — ONDANSETRON 4 MG: 4 TABLET, ORALLY DISINTEGRATING ORAL at 18:02

## 2019-04-19 RX ADMIN — DIPHENHYDRAMINE HYDROCHLORIDE 25 MG: 25 CAPSULE ORAL at 14:05

## 2019-04-19 RX ADMIN — ONDANSETRON 4 MG: 4 TABLET, ORALLY DISINTEGRATING ORAL at 02:06

## 2019-04-19 RX ADMIN — DEXTROSE MONOHYDRATE, SODIUM CHLORIDE, AND POTASSIUM CHLORIDE 100 ML/HR: 50; 4.5; 1.49 INJECTION, SOLUTION INTRAVENOUS at 19:42

## 2019-04-19 RX ADMIN — DIPHENHYDRAMINE HYDROCHLORIDE 25 MG: 25 CAPSULE ORAL at 18:02

## 2019-04-19 RX ADMIN — DIPHENHYDRAMINE HYDROCHLORIDE 25 MG: 25 CAPSULE ORAL at 22:06

## 2019-04-19 RX ADMIN — ONDANSETRON 4 MG: 4 TABLET, ORALLY DISINTEGRATING ORAL at 22:06

## 2019-04-19 RX ADMIN — HYDROMORPHONE HYDROCHLORIDE 1 MG: 1 INJECTION, SOLUTION INTRAMUSCULAR; INTRAVENOUS; SUBCUTANEOUS at 06:02

## 2019-04-19 RX ADMIN — DOCUSATE SODIUM 100 MG: 100 CAPSULE, LIQUID FILLED ORAL at 08:52

## 2019-04-19 RX ADMIN — ONDANSETRON 4 MG: 4 TABLET, ORALLY DISINTEGRATING ORAL at 06:02

## 2019-04-19 RX ADMIN — ONDANSETRON 4 MG: 4 TABLET, ORALLY DISINTEGRATING ORAL at 14:05

## 2019-04-19 RX ADMIN — FOLIC ACID 1 MG: 1 TABLET ORAL at 08:53

## 2019-04-19 RX ADMIN — CITALOPRAM HYDROBROMIDE 10 MG: 20 TABLET ORAL at 18:02

## 2019-04-19 RX ADMIN — HYDROMORPHONE HYDROCHLORIDE 1 MG: 1 INJECTION, SOLUTION INTRAMUSCULAR; INTRAVENOUS; SUBCUTANEOUS at 14:06

## 2019-04-19 RX ADMIN — PANTOPRAZOLE SODIUM 40 MG: 40 TABLET, DELAYED RELEASE ORAL at 08:53

## 2019-04-19 RX ADMIN — DIPHENHYDRAMINE HYDROCHLORIDE 25 MG: 25 CAPSULE ORAL at 06:02

## 2019-04-19 RX ADMIN — HYDROMORPHONE HYDROCHLORIDE 1 MG: 1 INJECTION, SOLUTION INTRAMUSCULAR; INTRAVENOUS; SUBCUTANEOUS at 18:02

## 2019-04-19 RX ADMIN — HYDROMORPHONE HYDROCHLORIDE 1 MG: 1 INJECTION, SOLUTION INTRAMUSCULAR; INTRAVENOUS; SUBCUTANEOUS at 10:15

## 2019-04-19 NOTE — PROGRESS NOTES
General Daily Progress Note Admit Date: 4/14/2019 Subjective:  
 
Patient continues to complain of pain. Current Facility-Administered Medications Medication Dose Route Frequency  dextrose 5 % - 0.45% NaCl 1,000 mL with potassium chloride 20 mEq infusion   IntraVENous CONTINUOUS  
 fentaNYL (DURAGESIC) 75 mcg/hr patch 1 Patch  1 Patch TransDERmal Q72H  citalopram (CELEXA) tablet 10 mg  10 mg Oral QPM  
 folic acid (FOLVITE) tablet 1 mg  1 mg Oral DAILY  pantoprazole (PROTONIX) tablet 40 mg  40 mg Oral ACB  
 HYDROmorphone (PF) (DILAUDID) injection 1 mg  1 mg IntraVENous Q3H PRN  
 acetaminophen (TYLENOL) tablet 650 mg  650 mg Oral Q4H PRN  
 naloxone (NARCAN) injection 0.4 mg  0.4 mg IntraVENous PRN  
 diphenhydrAMINE (BENADRYL) capsule 25 mg  25 mg Oral Q4H PRN  
 ondansetron (ZOFRAN ODT) tablet 4 mg  4 mg Oral Q4H PRN  
 docusate sodium (COLACE) capsule 100 mg  100 mg Oral BID  enoxaparin (LOVENOX) injection 40 mg  40 mg SubCUTAneous Q24H Review of Systems A comprehensive review of systems was negative. Objective:  
 
Patient Vitals for the past 24 hrs: 
 BP Temp Pulse Resp SpO2  
04/18/19 1939 132/80 99.1 °F (37.3 °C) 89 16 97 % 04/18/19 1537 132/80 98.7 °F (37.1 °C) 84 16 98 % 04/18/19 1315 132/77  89  100 % 04/18/19 1108 106/57 98.6 °F (37 °C) 79 16 96 % 04/18/19 0714 135/75 98.5 °F (36.9 °C) 84 16 99 % 04/18/19 0245 116/57 98.5 °F (36.9 °C) 80 18 96 % 04/17/19 2325 132/76 98.8 °F (37.1 °C) 85 18 97 % No intake/output data recorded. 04/17 0701 - 04/18 1900 In: 1898.3 [P.O.:480; I.V.:1418.3] Out: - Physical Exam:  
Visit Vitals /80 (BP 1 Location: Right arm, BP Patient Position: Sitting) Pulse 89 Temp 99.1 °F (37.3 °C) Resp 16 Ht 5' 11\" (1.803 m) Wt 189 lb 13.1 oz (86.1 kg) SpO2 97% BMI 26.47 kg/m² General appearance: alert, cooperative, no distress, appears stated age Neck: supple, symmetrical, trachea midline, no adenopathy, thyroid: not enlarged, symmetric, no tenderness/mass/nodules, no carotid bruit and no JVD Lungs: clear to auscultation bilaterally Heart: regular rate and rhythm, S1, S2 normal, no murmur, click, rub or gallop Abdomen: soft, non-tender. Bowel sounds normal. No masses,  no organomegaly Extremities: extremities normal, atraumatic, no cyanosis or edema Data Review Recent Results (from the past 24 hour(s)) CBC WITH MANUAL DIFF Collection Time: 04/18/19  5:01 AM  
Result Value Ref Range WBC 11.0 3.6 - 11.0 K/uL  
 RBC 2.61 (L) 3.80 - 5.20 M/uL HGB 8.3 (L) 11.5 - 16.0 g/dL HCT 23.9 (L) 35.0 - 47.0 % MCV 91.6 80.0 - 99.0 FL  
 MCH 31.8 26.0 - 34.0 PG  
 MCHC 34.7 30.0 - 36.5 g/dL  
 RDW 17.3 (H) 11.5 - 14.5 % PLATELET 536 022 - 664 K/uL MPV 11.4 8.9 - 12.9 FL  
 NRBC 0.7 (H) 0  WBC ABSOLUTE NRBC 0.08 (H) 0.00 - 0.01 K/uL NEUTROPHILS 37 32 - 75 % BAND NEUTROPHILS 0 0 - 6 % LYMPHOCYTES 55 (H) 12 - 49 % MONOCYTES 4 (L) 5 - 13 % EOSINOPHILS 3 0 - 7 % BASOPHILS 0 0 - 1 % METAMYELOCYTES 1 (H) 0 % MYELOCYTES 0 0 % PROMYELOCYTES 0 0 % BLASTS 0 0 % OTHER CELL 0 0 IMMATURE GRANULOCYTES 0 %  
 ABS. NEUTROPHILS 4.1 1.8 - 8.0 K/UL  
 ABS. LYMPHOCYTES 6.1 (H) 0.8 - 3.5 K/UL  
 ABS. MONOCYTES 0.4 0.0 - 1.0 K/UL  
 ABS. EOSINOPHILS 0.3 0.0 - 0.4 K/UL  
 ABS. BASOPHILS 0.0 0.0 - 0.1 K/UL  
 ABS. IMM. GRANS. 0.0 K/UL  
 DF MANUAL    
 RBC COMMENTS ANISOCYTOSIS 1+ 
    
 RBC COMMENTS TARGET CELLS 
1+ METABOLIC PANEL, COMPREHENSIVE Collection Time: 04/18/19  5:01 AM  
Result Value Ref Range Sodium 138 136 - 145 mmol/L Potassium 4.6 3.5 - 5.1 mmol/L Chloride 108 97 - 108 mmol/L  
 CO2 28 21 - 32 mmol/L Anion gap 2 (L) 5 - 15 mmol/L Glucose 81 65 - 100 mg/dL BUN 9 6 - 20 MG/DL  Creatinine 0.70 0.55 - 1.02 MG/DL  
 BUN/Creatinine ratio 13 12 - 20    
 GFR est AA >60 >60 ml/min/1.73m2 GFR est non-AA >60 >60 ml/min/1.73m2 Calcium 8.4 (L) 8.5 - 10.1 MG/DL Bilirubin, total 1.3 (H) 0.2 - 1.0 MG/DL  
 ALT (SGPT) 18 12 - 78 U/L  
 AST (SGOT) 36 15 - 37 U/L Alk. phosphatase 113 45 - 117 U/L Protein, total 7.9 6.4 - 8.2 g/dL Albumin 3.5 3.5 - 5.0 g/dL Globulin 4.4 (H) 2.0 - 4.0 g/dL A-G Ratio 0.8 (L) 1.1 - 2.2 Assessment:  
 
Active Problems: 
  Sick[facility] crisis ([facility] (9/8/2018) Plan: 1. Continue treatment of painful sickle crises.

## 2019-04-19 NOTE — PROGRESS NOTES
Problem: Pain Goal: *Control of Pain Outcome: Progressing Towards Goal 
Pt using numeric pain scale successfully and is managing her intense acute and chronic pain with PRN meds she requests routinely Goal: *PALLIATIVE CARE:  Alleviation of Pain Outcome: Progressing Towards Goal 
Pt follows pain schedule.

## 2019-04-19 NOTE — PROGRESS NOTES
Oncology End of Shift Note Bedside shift change report given to Jd Roblero RN (incoming nurse) by Giorgi Whalen RN (outgoing nurse) on [facility]. Report included the following information SBAR, Kardex, MAR and Accordion. Shift Summary: No acute events overnight, pain meds x3 Issues for Physician to Address:  None Patient on Cardiac Monitoring? [x] Yes 
[] No 
 
Rhythm: Telemetry: normal sinus rhythm Shift Events Giorgi Whalen RN

## 2019-04-19 NOTE — PROGRESS NOTES
Oncology End of Shift Note Bedside shift change report given to Julio Pires RN (incoming nurse) by Travis Davis RN (outgoing nurse) on [facility]. Report included the following information SBAR. Shift Summary:  
Pt stable, fluids continue along with PRN pain meds. Pt stable, still no BM. Issues for Physician to Address:  Provided additional med coverage for stools Patient on Cardiac Monitoring? [x] Yes 
[] No 
 
Rhythm: Telemetry: normal sinus rhythm Travis Davis RN

## 2019-04-19 NOTE — PROGRESS NOTES
Initial Nutrition Assessment: 
 
INTERVENTIONS/RECOMMENDATIONS:  
· Continue current diet ASSESSMENT:  
Chart reviewed, medically noted for sickle cell crisis and PMH shown below. Assessing pt due to LOS. Pt well know to me from past admissions. Pt reports good appetite and eating well. No nutrition concerns, will sign off. Past Medical History:  
Diagnosis Date  Anemia SC hemoglobinopathy  Chronic pain  Depression  Hypertension  Liver disease   
 hepatitis C; pt reports \"cured\"  Sickle cell disease ([address] Diet Order: Regular 
% Eaten:   
Patient Vitals for the past 72 hrs: 
 % Diet Eaten 04/18/19 1801 100 % Pertinent Medications: [x]Reviewed:  colace, folic acid, zofran, PPI Pertinent Labs: [x]Reviewed: 
Food Allergies: [x]NKFA  []Other Last BM: 4/13 Edema:  n/a      []RUE   []LUE   []RLE   []LLE Pressure Injury:  n/a    [] Stage I   [] Stage II   [] Stage III   [] Stage IV Wt Readings from Last 30 Encounters:  
04/14/19 86.1 kg (189 lb 13.1 oz) 04/04/19 88.5 kg (195 lb 3.2 oz) 03/07/19 89.4 kg (197 lb 3.2 oz) 02/16/19 80.6 kg (177 lb 11.1 oz) 02/07/19 82.2 kg (181 lb 4.8 oz) 01/19/19 79.8 kg (175 lb 14.8 oz) 01/10/19 82.9 kg (182 lb 12.8 oz)  
12/12/18 77 kg (169 lb 12.1 oz)  
11/13/18 89.4 kg (197 lb 1.6 oz) 10/29/18 77.4 kg (170 lb 10.2 oz)  
10/02/18 91.5 kg (201 lb 12.8 oz) 09/24/18 86.3 kg (190 lb 3.2 oz) 09/07/18 83.9 kg (185 lb)  
07/22/18 85.7 kg (188 lb 15 oz) 07/09/18 86.6 kg (191 lb)  
06/12/18 88.7 kg (195 lb 8 oz) 04/09/18 86 kg (189 lb 8 oz) 03/13/18 84.9 kg (187 lb 3.2 oz) 01/29/18 87.2 kg (192 lb 4.8 oz) 01/05/18 86.4 kg (190 lb 7.6 oz) 01/04/18 86 kg (189 lb 9.5 oz)  
11/28/17 87.7 kg (193 lb 4.8 oz)  
11/14/17 89.9 kg (198 lb 4.8 oz) 10/31/17 87.7 kg (193 lb 6.4 oz) 07/31/17 88 kg (194 lb)  
06/13/17 87.6 kg (193 lb 1.6 oz) 05/22/17 86.1 kg (189 lb 13.1 oz) 04/27/17 90.6 kg (199 lb 11.2 oz) 04/06/17 91.9 kg (202 lb 9.6 oz) 03/10/17 89.9 kg (198 lb 3.2 oz) Anthropometrics:  
Height: 5' 11\" (180.3 cm) Weight: 86.1 kg (189 lb 13.1 oz) IBW (%IBW):   ( ) UBW (%UBW):   (  %) Last Weight Metrics: 
Weight Loss Metrics 4/14/2019 4/4/2019 3/7/2019 2/16/2019 2/7/2019 1/19/2019 1/10/2019 Today's Wt 189 lb 13.1 oz 195 lb 3.2 oz 197 lb 3.2 oz 177 lb 11.1 oz 181 lb 4.8 oz 175 lb 14.8 oz 182 lb 12.8 oz  
BMI 26.47 kg/m2 28.01 kg/m2 28.3 kg/m2 25.5 kg/m2 26.01 kg/m2 25.24 kg/m2 25.5 kg/m2 BMI: Body mass index is 26.47 kg/m². This BMI is indicative of: 
 []Underweight    []Normal    [x]Overweight    [] Obesity   [] Extreme Obesity (BMI>40) Estimated Nutrition Needs (Based on):  
1778 Kcals/day(BMR: 1550 x 1.2) , 70 g(0.8 g/kg) Protein Carbohydrate: At Least 130 g/day  Fluids: 1850 mL/day (1ml/kcal) or per primary team 
 
NUTRITION DIAGNOSES:  
Problem:  No nutritional diagnosis at this time Etiology: related to Signs/Symptoms: as evidenced by NUTRITION INTERVENTIONS: 
Meals/Snacks: General/healthful diet GOAL:  
consume >75% of meals in 5-7 days LEARNING NEEDS (Diet, Food/Nutrient-Drug Interaction):  
 [x] None Identified 
 [] Identified and Education Provided/Documented 
 [] Identified and Pt declined/was not appropriate Cultureal, Restorationist, OR Ethnic Dietary Needs:  
 [x] None Identified 
 [] Identified and Addressed 
 
 [x] Interdisciplinary Care Plan Reviewed/Documented  
 [x] Discharge Planning: General healthy diet MONITORING /EVALUATION:  
  
Food/Nutrient Intake Outcomes: Total energy intake Physical Signs/Symptoms Outcomes: Weight/weight change, Electrolyte and renal profile, GI 
 
NUTRITION RISK:  
 [] High              [] Moderate           []  Low  [x]  Minimal/Uncompromised PT SEEN FOR:  
 []  MD Consult: []Calorie Count []Diabetic Diet Education []Diet Education []Electrolyte Management []General Nutrition Management and Supplements []Management of Tube Feeding []TPN Recommendations []  RN Referral:  []MST score >=2 
   []Enteral/Parenteral Nutrition PTA []Pregnant: Gestational DM or Multigestation 
   []Pressure Ulcer/Wound Care needs 
     
[]  Low BMI [x]  LOS Referral  
 
 
Marilyn Osborne RDN Pager [phone] Weekend Pager [phone]

## 2019-04-19 NOTE — PROGRESS NOTES
General Daily Progress Note Admit Date: 4/14/2019 Subjective:  
 
Patient continues to complain of pain. Current Facility-Administered Medications Medication Dose Route Frequency  dextrose 5% - 0.45% NaCl with KCl 20 mEq/L infusion  100 mL/hr IntraVENous CONTINUOUS  
 fentaNYL (DURAGESIC) 75 mcg/hr patch 1 Patch  1 Patch TransDERmal Q72H  citalopram (CELEXA) tablet 10 mg  10 mg Oral QPM  
 folic acid (FOLVITE) tablet 1 mg  1 mg Oral DAILY  pantoprazole (PROTONIX) tablet 40 mg  40 mg Oral ACB  
 HYDROmorphone (PF) (DILAUDID) injection 1 mg  1 mg IntraVENous Q3H PRN  
 acetaminophen (TYLENOL) tablet 650 mg  650 mg Oral Q4H PRN  
 naloxone (NARCAN) injection 0.4 mg  0.4 mg IntraVENous PRN  
 diphenhydrAMINE (BENADRYL) capsule 25 mg  25 mg Oral Q4H PRN  
 ondansetron (ZOFRAN ODT) tablet 4 mg  4 mg Oral Q4H PRN  
 docusate sodium (COLACE) capsule 100 mg  100 mg Oral BID  enoxaparin (LOVENOX) injection 40 mg  40 mg SubCUTAneous Q24H Review of Systems A comprehensive review of systems was negative. Objective:  
 
Patient Vitals for the past 24 hrs: 
 BP Temp Pulse Resp SpO2  
04/19/19 1503 133/75 99.8 °F (37.7 °C) 86 16 96 % 04/19/19 1120 124/61 99.1 °F (37.3 °C) 78 16 96 % 04/19/19 0747 137/83 99.5 °F (37.5 °C) 74 16 97 % 04/19/19 0324 142/74 98.4 °F (36.9 °C) 76 16 97 % 04/18/19 2248 130/78 98.9 °F (37.2 °C) 84 16 96 % 04/18/19 1939 132/80 99.1 °F (37.3 °C) 89 16 97 % No intake/output data recorded. 04/17 1901 - 04/19 0700 In: 480 [P.O.:480] Out: - Physical Exam:  
Visit Vitals /75 (BP 1 Location: Right arm, BP Patient Position: At rest) Pulse 86 Temp 99.8 °F (37.7 °C) Resp 16 Ht 5' 11\" (1.803 m) Wt 189 lb 13.1 oz (86.1 kg) SpO2 96% BMI 26.47 kg/m² General appearance: alert, cooperative, no distress, appears stated age Neck: supple, symmetrical, trachea midline, no adenopathy, thyroid: not enlarged, symmetric, no tenderness/mass/nodules, no carotid bruit and no JVD Lungs: clear to auscultation bilaterally Heart: regular rate and rhythm, S1, S2 normal, no murmur, click, rub or gallop Abdomen: soft, non-tender. Bowel sounds normal. No masses,  no organomegaly Extremities: extremities normal, atraumatic, no cyanosis or edema Data Review No results found for this or any previous visit (from the past 24 hour(s)). Assessment:  
 
Active Problems: 
  Sickle cell pain crisis ([facility] [address] (9/8/2018) Plan: 1. Continue treatment of painful sickle crises. Hemoglobin reasonably stable for now. Will mobilize.

## 2019-04-20 PROCEDURE — 74011250636 HC RX REV CODE- 250/636: Performed by: INTERNAL MEDICINE

## 2019-04-20 PROCEDURE — 65660000000 HC RM CCU STEPDOWN

## 2019-04-20 PROCEDURE — 74011250637 HC RX REV CODE- 250/637: Performed by: INTERNAL MEDICINE

## 2019-04-20 RX ADMIN — DEXTROSE MONOHYDRATE, SODIUM CHLORIDE, AND POTASSIUM CHLORIDE 100 ML/HR: 50; 4.5; 1.49 INJECTION, SOLUTION INTRAVENOUS at 15:38

## 2019-04-20 RX ADMIN — DIPHENHYDRAMINE HYDROCHLORIDE 25 MG: 25 CAPSULE ORAL at 23:56

## 2019-04-20 RX ADMIN — ONDANSETRON 4 MG: 4 TABLET, ORALLY DISINTEGRATING ORAL at 02:41

## 2019-04-20 RX ADMIN — ENOXAPARIN SODIUM 40 MG: 40 INJECTION SUBCUTANEOUS at 10:37

## 2019-04-20 RX ADMIN — HYDROMORPHONE HYDROCHLORIDE 1 MG: 1 INJECTION, SOLUTION INTRAMUSCULAR; INTRAVENOUS; SUBCUTANEOUS at 20:43

## 2019-04-20 RX ADMIN — HYDROMORPHONE HYDROCHLORIDE 1 MG: 1 INJECTION, SOLUTION INTRAMUSCULAR; INTRAVENOUS; SUBCUTANEOUS at 02:41

## 2019-04-20 RX ADMIN — HYDROMORPHONE HYDROCHLORIDE 1 MG: 1 INJECTION, SOLUTION INTRAMUSCULAR; INTRAVENOUS; SUBCUTANEOUS at 07:33

## 2019-04-20 RX ADMIN — ONDANSETRON 4 MG: 4 TABLET, ORALLY DISINTEGRATING ORAL at 11:19

## 2019-04-20 RX ADMIN — DIPHENHYDRAMINE HYDROCHLORIDE 25 MG: 25 CAPSULE ORAL at 11:19

## 2019-04-20 RX ADMIN — ONDANSETRON 4 MG: 4 TABLET, ORALLY DISINTEGRATING ORAL at 15:39

## 2019-04-20 RX ADMIN — ONDANSETRON 4 MG: 4 TABLET, ORALLY DISINTEGRATING ORAL at 07:33

## 2019-04-20 RX ADMIN — HYDROMORPHONE HYDROCHLORIDE 1 MG: 1 INJECTION, SOLUTION INTRAMUSCULAR; INTRAVENOUS; SUBCUTANEOUS at 17:10

## 2019-04-20 RX ADMIN — DIPHENHYDRAMINE HYDROCHLORIDE 25 MG: 25 CAPSULE ORAL at 02:41

## 2019-04-20 RX ADMIN — FOLIC ACID 1 MG: 1 TABLET ORAL at 10:37

## 2019-04-20 RX ADMIN — CITALOPRAM HYDROBROMIDE 10 MG: 20 TABLET ORAL at 17:10

## 2019-04-20 RX ADMIN — ONDANSETRON 4 MG: 4 TABLET, ORALLY DISINTEGRATING ORAL at 19:16

## 2019-04-20 RX ADMIN — DIPHENHYDRAMINE HYDROCHLORIDE 25 MG: 25 CAPSULE ORAL at 15:39

## 2019-04-20 RX ADMIN — DEXTROSE MONOHYDRATE, SODIUM CHLORIDE, AND POTASSIUM CHLORIDE 100 ML/HR: 50; 4.5; 1.49 INJECTION, SOLUTION INTRAVENOUS at 05:25

## 2019-04-20 RX ADMIN — ONDANSETRON 4 MG: 4 TABLET, ORALLY DISINTEGRATING ORAL at 23:56

## 2019-04-20 RX ADMIN — HYDROMORPHONE HYDROCHLORIDE 1 MG: 1 INJECTION, SOLUTION INTRAMUSCULAR; INTRAVENOUS; SUBCUTANEOUS at 23:56

## 2019-04-20 RX ADMIN — HYDROMORPHONE HYDROCHLORIDE 1 MG: 1 INJECTION, SOLUTION INTRAMUSCULAR; INTRAVENOUS; SUBCUTANEOUS at 10:47

## 2019-04-20 RX ADMIN — HYDROMORPHONE HYDROCHLORIDE 1 MG: 1 INJECTION, SOLUTION INTRAMUSCULAR; INTRAVENOUS; SUBCUTANEOUS at 13:50

## 2019-04-20 RX ADMIN — PANTOPRAZOLE SODIUM 40 MG: 40 TABLET, DELAYED RELEASE ORAL at 07:33

## 2019-04-20 RX ADMIN — DIPHENHYDRAMINE HYDROCHLORIDE 25 MG: 25 CAPSULE ORAL at 19:16

## 2019-04-20 RX ADMIN — DIPHENHYDRAMINE HYDROCHLORIDE 25 MG: 25 CAPSULE ORAL at 07:33

## 2019-04-20 RX ADMIN — DOCUSATE SODIUM 100 MG: 100 CAPSULE, LIQUID FILLED ORAL at 17:10

## 2019-04-20 RX ADMIN — DOCUSATE SODIUM 100 MG: 100 CAPSULE, LIQUID FILLED ORAL at 10:37

## 2019-04-20 NOTE — PROGRESS NOTES
General Daily Progress Note Admit Date: 4/14/2019 Subjective:  
 
Patient continues to complain of pain. Current Facility-Administered Medications Medication Dose Route Frequency  dextrose 5% - 0.45% NaCl with KCl 20 mEq/L infusion  100 mL/hr IntraVENous CONTINUOUS  
 fentaNYL (DURAGESIC) 75 mcg/hr patch 1 Patch  1 Patch TransDERmal Q72H  citalopram (CELEXA) tablet 10 mg  10 mg Oral QPM  
 folic acid (FOLVITE) tablet 1 mg  1 mg Oral DAILY  pantoprazole (PROTONIX) tablet 40 mg  40 mg Oral ACB  
 HYDROmorphone (PF) (DILAUDID) injection 1 mg  1 mg IntraVENous Q3H PRN  
 acetaminophen (TYLENOL) tablet 650 mg  650 mg Oral Q4H PRN  
 naloxone (NARCAN) injection 0.4 mg  0.4 mg IntraVENous PRN  
 diphenhydrAMINE (BENADRYL) capsule 25 mg  25 mg Oral Q4H PRN  
 ondansetron (ZOFRAN ODT) tablet 4 mg  4 mg Oral Q4H PRN  
 docusate sodium (COLACE) capsule 100 mg  100 mg Oral BID  enoxaparin (LOVENOX) injection 40 mg  40 mg SubCUTAneous Q24H Review of Systems A comprehensive review of systems was negative. Objective:  
 
Patient Vitals for the past 24 hrs: 
 BP Temp Pulse Resp SpO2  
04/20/19 0710 129/73 98.3 °F (36.8 °C) 77 16 99 % 04/20/19 0246 148/78 98.8 °F (37.1 °C) 86 16 98 % 04/19/19 2256 130/76 98.7 °F (37.1 °C) 81 16 96 % 04/19/19 1941 127/70 98.9 °F (37.2 °C) 84 16 99 % 04/19/19 1503 133/75 99.8 °F (37.7 °C) 86 16 96 % No intake/output data recorded. 04/18 1901 - 04/20 0700 In: 2938.3 [I.V.:2938.3] Out: - Physical Exam:  
Visit Vitals /73 (BP 1 Location: Right arm, BP Patient Position: At rest) Pulse 77 Temp 98.3 °F (36.8 °C) Resp 16 Ht 5' 11\" (1.803 m) Wt 189 lb 13.1 oz (86.1 kg) SpO2 99% BMI 26.47 kg/m² General appearance: alert, cooperative, no distress, appears stated age Neck: supple, symmetrical, trachea midline, no adenopathy, thyroid: not enlarged, symmetric, no tenderness/mass/nodules, no carotid bruit and no JVD Lungs: clear to auscultation bilaterally Heart: regular rate and rhythm, S1, S2 normal, no murmur, click, rub or gallop Abdomen: soft, non-tender. Bowel sounds normal. No masses,  no organomegaly Extremities: extremities normal, atraumatic, no cyanosis or edema Data Review No results found for this or any previous visit (from the past 24 hour(s)). Assessment:  
 
Active Problems: 
  Sickle cell pain crisis ([facility] [address] (9/8/2018) Plan: 1. Continue treatment of painful crises. Uncomplicated thus far.

## 2019-04-20 NOTE — PROGRESS NOTES
Oncology End of Shift Note Bedside shift change report given to Shamar David (incoming nurse) by Lloyd Faulkner RN (outgoing nurse) on [facility]. Report included the following information SBAR. Shift Summary:  
 
 
Issues for Physician to Address:  
 
Patient on Cardiac Monitoring? [x] Yes 
[] No 
 
Rhythm: nsr Lloyd Faulkner RN

## 2019-04-20 NOTE — PROGRESS NOTES
Problem: Patient Education: Go to Patient Education Activity Goal: Patient/Family Education Outcome: Progressing Towards Goal 
  
Problem: Pain Goal: *Control of Pain Outcome: Progressing Towards Goal

## 2019-04-20 NOTE — PROGRESS NOTES
Oncology End of Shift Note Bedside shift change report given to Michelet Koroma RN (incoming nurse) by Martha Mancuso (outgoing nurse) on [facility]. Report included the following information SBAR, Kardex, Intake/Output and MAR. Shift Summary: Pt remained stable during shift. PRN medciations x2. No major complaints. Issues for Physician to Address:   
 
Patient on Cardiac Monitoring? [x] Yes 
[] No 
 
Rhythm: Telemetry: normal sinus rhythm Martha Mancuso

## 2019-04-21 LAB
ALBUMIN SERPL-MCNC: 3.4 G/DL (ref 3.5–5)
ALBUMIN/GLOB SERPL: 0.8 {RATIO} (ref 1.1–2.2)
ALP SERPL-CCNC: 105 U/L (ref 45–117)
ALT SERPL-CCNC: 17 U/L (ref 12–78)
ANION GAP SERPL CALC-SCNC: 4 MMOL/L (ref 5–15)
AST SERPL-CCNC: 28 U/L (ref 15–37)
BASOPHILS # BLD: 0.1 K/UL (ref 0–0.1)
BASOPHILS NFR BLD: 1 % (ref 0–1)
BILIRUB SERPL-MCNC: 1.3 MG/DL (ref 0.2–1)
BLASTS NFR BLD MANUAL: 0 %
BUN SERPL-MCNC: 9 MG/DL (ref 6–20)
BUN/CREAT SERPL: 13 (ref 12–20)
CALCIUM SERPL-MCNC: 8.3 MG/DL (ref 8.5–10.1)
CHLORIDE SERPL-SCNC: 105 MMOL/L (ref 97–108)
CO2 SERPL-SCNC: 29 MMOL/L (ref 21–32)
CREAT SERPL-MCNC: 0.71 MG/DL (ref 0.55–1.02)
DIFFERENTIAL METHOD BLD: ABNORMAL
EOSINOPHIL # BLD: 0.6 K/UL (ref 0–0.4)
EOSINOPHIL NFR BLD: 6 % (ref 0–7)
ERYTHROCYTE [DISTWIDTH] IN BLOOD BY AUTOMATED COUNT: 17.9 % (ref 11.5–14.5)
GLOBULIN SER CALC-MCNC: 4.3 G/DL (ref 2–4)
GLUCOSE SERPL-MCNC: 95 MG/DL (ref 65–100)
HCT VFR BLD AUTO: 21.2 % (ref 35–47)
HGB BLD-MCNC: 7.2 G/DL (ref 11.5–16)
IMM GRANULOCYTES # BLD AUTO: 0 K/UL
IMM GRANULOCYTES NFR BLD AUTO: 0 %
LYMPHOCYTES # BLD: 5.4 K/UL (ref 0.8–3.5)
LYMPHOCYTES NFR BLD: 57 % (ref 12–49)
MCH RBC QN AUTO: 31.2 PG (ref 26–34)
MCHC RBC AUTO-ENTMCNC: 34 G/DL (ref 30–36.5)
MCV RBC AUTO: 91.8 FL (ref 80–99)
METAMYELOCYTES NFR BLD MANUAL: 0 %
MONOCYTES # BLD: 1 K/UL (ref 0–1)
MONOCYTES NFR BLD: 10 % (ref 5–13)
MYELOCYTES NFR BLD MANUAL: 0 %
NEUTS BAND NFR BLD MANUAL: 0 % (ref 0–6)
NEUTS SEG # BLD: 2.5 K/UL (ref 1.8–8)
NEUTS SEG NFR BLD: 26 % (ref 32–75)
NRBC # BLD: 0.04 K/UL (ref 0–0.01)
NRBC BLD-RTO: 0.4 PER 100 WBC
OTHER CELLS NFR BLD MANUAL: 0 %
PLATELET # BLD AUTO: 204 K/UL (ref 150–400)
PMV BLD AUTO: 11.1 FL (ref 8.9–12.9)
POTASSIUM SERPL-SCNC: 4.4 MMOL/L (ref 3.5–5.1)
PROMYELOCYTES NFR BLD MANUAL: 0 %
PROT SERPL-MCNC: 7.7 G/DL (ref 6.4–8.2)
RBC # BLD AUTO: 2.31 M/UL (ref 3.8–5.2)
RBC MORPH BLD: ABNORMAL
SODIUM SERPL-SCNC: 138 MMOL/L (ref 136–145)
WBC # BLD AUTO: 9.6 K/UL (ref 3.6–11)

## 2019-04-21 PROCEDURE — 74011250637 HC RX REV CODE- 250/637: Performed by: INTERNAL MEDICINE

## 2019-04-21 PROCEDURE — 74011250636 HC RX REV CODE- 250/636: Performed by: INTERNAL MEDICINE

## 2019-04-21 PROCEDURE — 85027 COMPLETE CBC AUTOMATED: CPT

## 2019-04-21 PROCEDURE — 80053 COMPREHEN METABOLIC PANEL: CPT

## 2019-04-21 PROCEDURE — 94760 N-INVAS EAR/PLS OXIMETRY 1: CPT

## 2019-04-21 PROCEDURE — 65660000000 HC RM CCU STEPDOWN

## 2019-04-21 PROCEDURE — 36415 COLL VENOUS BLD VENIPUNCTURE: CPT

## 2019-04-21 RX ORDER — POLYETHYLENE GLYCOL 3350 17 G/17G
17 POWDER, FOR SOLUTION ORAL DAILY
Status: DISCONTINUED | OUTPATIENT
Start: 2019-04-21 | End: 2019-04-21 | Stop reason: SDUPTHER

## 2019-04-21 RX ORDER — POLYETHYLENE GLYCOL 3350 17 G/17G
17 POWDER, FOR SOLUTION ORAL DAILY
Status: DISCONTINUED | OUTPATIENT
Start: 2019-04-22 | End: 2019-05-02 | Stop reason: HOSPADM

## 2019-04-21 RX ADMIN — HYDROMORPHONE HYDROCHLORIDE 1 MG: 1 INJECTION, SOLUTION INTRAMUSCULAR; INTRAVENOUS; SUBCUTANEOUS at 20:57

## 2019-04-21 RX ADMIN — DOCUSATE SODIUM 100 MG: 100 CAPSULE, LIQUID FILLED ORAL at 17:09

## 2019-04-21 RX ADMIN — DEXTROSE MONOHYDRATE, SODIUM CHLORIDE, AND POTASSIUM CHLORIDE 100 ML/HR: 50; 4.5; 1.49 INJECTION, SOLUTION INTRAVENOUS at 20:56

## 2019-04-21 RX ADMIN — CITALOPRAM HYDROBROMIDE 10 MG: 20 TABLET ORAL at 17:08

## 2019-04-21 RX ADMIN — HYDROMORPHONE HYDROCHLORIDE 1 MG: 1 INJECTION, SOLUTION INTRAMUSCULAR; INTRAVENOUS; SUBCUTANEOUS at 17:18

## 2019-04-21 RX ADMIN — ONDANSETRON 4 MG: 4 TABLET, ORALLY DISINTEGRATING ORAL at 04:03

## 2019-04-21 RX ADMIN — PANTOPRAZOLE SODIUM 40 MG: 40 TABLET, DELAYED RELEASE ORAL at 07:54

## 2019-04-21 RX ADMIN — DEXTROSE MONOHYDRATE, SODIUM CHLORIDE, AND POTASSIUM CHLORIDE 100 ML/HR: 50; 4.5; 1.49 INJECTION, SOLUTION INTRAVENOUS at 01:09

## 2019-04-21 RX ADMIN — ENOXAPARIN SODIUM 40 MG: 40 INJECTION SUBCUTANEOUS at 08:05

## 2019-04-21 RX ADMIN — HYDROMORPHONE HYDROCHLORIDE 1 MG: 1 INJECTION, SOLUTION INTRAMUSCULAR; INTRAVENOUS; SUBCUTANEOUS at 07:55

## 2019-04-21 RX ADMIN — DIPHENHYDRAMINE HYDROCHLORIDE 25 MG: 25 CAPSULE ORAL at 20:57

## 2019-04-21 RX ADMIN — HYDROMORPHONE HYDROCHLORIDE 1 MG: 1 INJECTION, SOLUTION INTRAMUSCULAR; INTRAVENOUS; SUBCUTANEOUS at 04:03

## 2019-04-21 RX ADMIN — ONDANSETRON 4 MG: 4 TABLET, ORALLY DISINTEGRATING ORAL at 17:19

## 2019-04-21 RX ADMIN — DEXTROSE MONOHYDRATE, SODIUM CHLORIDE, AND POTASSIUM CHLORIDE 100 ML/HR: 50; 4.5; 1.49 INJECTION, SOLUTION INTRAVENOUS at 11:07

## 2019-04-21 RX ADMIN — ONDANSETRON 4 MG: 4 TABLET, ORALLY DISINTEGRATING ORAL at 13:02

## 2019-04-21 RX ADMIN — FOLIC ACID 1 MG: 1 TABLET ORAL at 08:04

## 2019-04-21 RX ADMIN — ONDANSETRON 4 MG: 4 TABLET, ORALLY DISINTEGRATING ORAL at 07:56

## 2019-04-21 RX ADMIN — DIPHENHYDRAMINE HYDROCHLORIDE 25 MG: 25 CAPSULE ORAL at 07:56

## 2019-04-21 RX ADMIN — DIPHENHYDRAMINE HYDROCHLORIDE 25 MG: 25 CAPSULE ORAL at 04:03

## 2019-04-21 RX ADMIN — HYDROMORPHONE HYDROCHLORIDE 1 MG: 1 INJECTION, SOLUTION INTRAMUSCULAR; INTRAVENOUS; SUBCUTANEOUS at 13:01

## 2019-04-21 RX ADMIN — DOCUSATE SODIUM 100 MG: 100 CAPSULE, LIQUID FILLED ORAL at 08:04

## 2019-04-21 RX ADMIN — ONDANSETRON 4 MG: 4 TABLET, ORALLY DISINTEGRATING ORAL at 20:57

## 2019-04-21 RX ADMIN — DIPHENHYDRAMINE HYDROCHLORIDE 25 MG: 25 CAPSULE ORAL at 13:02

## 2019-04-21 RX ADMIN — DIPHENHYDRAMINE HYDROCHLORIDE 25 MG: 25 CAPSULE ORAL at 17:18

## 2019-04-21 RX ADMIN — POLYETHYLENE GLYCOL 3350 17 G: 17 POWDER, FOR SOLUTION ORAL at 14:27

## 2019-04-21 NOTE — PROGRESS NOTES
General Daily Progress Note Admit Date: 4/14/2019 Subjective:  
 
Patient complains of general pain and constipation. Current Facility-Administered Medications Medication Dose Route Frequency  [START ON 4/22/2019] polyethylene glycol (MIRALAX) packet 17 g  17 g Oral DAILY  dextrose 5% - 0.45% NaCl with KCl 20 mEq/L infusion  100 mL/hr IntraVENous CONTINUOUS  
 fentaNYL (DURAGESIC) 75 mcg/hr patch 1 Patch  1 Patch TransDERmal Q72H  citalopram (CELEXA) tablet 10 mg  10 mg Oral QPM  
 folic acid (FOLVITE) tablet 1 mg  1 mg Oral DAILY  pantoprazole (PROTONIX) tablet 40 mg  40 mg Oral ACB  
 HYDROmorphone (PF) (DILAUDID) injection 1 mg  1 mg IntraVENous Q3H PRN  
 acetaminophen (TYLENOL) tablet 650 mg  650 mg Oral Q4H PRN  
 naloxone (NARCAN) injection 0.4 mg  0.4 mg IntraVENous PRN  
 diphenhydrAMINE (BENADRYL) capsule 25 mg  25 mg Oral Q4H PRN  
 ondansetron (ZOFRAN ODT) tablet 4 mg  4 mg Oral Q4H PRN  
 docusate sodium (COLACE) capsule 100 mg  100 mg Oral BID  enoxaparin (LOVENOX) injection 40 mg  40 mg SubCUTAneous Q24H Review of Systems A comprehensive review of systems was negative. Objective:  
 
Patient Vitals for the past 24 hrs: 
 BP Temp Pulse Resp SpO2  
04/21/19 1138 125/64 99.3 °F (37.4 °C) 83 16 96 % 04/21/19 0745 (!) 146/93 99.3 °F (37.4 °C) 88 16 95 % 04/21/19 0344 141/62 98.5 °F (36.9 °C) 93 16 96 % 04/20/19 2226 115/85 98.5 °F (36.9 °C) 90 16 95 % 04/20/19 1944 137/79 98.6 °F (37 °C) 82 16 95 % 04/20/19 1547 140/85 98.4 °F (36.9 °C) 82 18 98 % No intake/output data recorded. 04/19 1901 - 04/21 0700 In: 4636.7 [I.V.:4636.7] Out: - Physical Exam:  
Visit Vitals /64 (BP 1 Location: Right arm, BP Patient Position: At rest) Pulse 83 Temp 99.3 °F (37.4 °C) Resp 16 Ht 5' 11\" (1.803 m) Wt 189 lb 13.1 oz (86.1 kg) SpO2 96% BMI 26.47 kg/m² General appearance: alert, cooperative, no distress, appears stated age Neck: supple, symmetrical, trachea midline, no adenopathy, thyroid: not enlarged, symmetric, no tenderness/mass/nodules, no carotid bruit and no JVD Lungs: clear to auscultation bilaterally Heart: regular rate and rhythm, S1, S2 normal, no murmur, click, rub or gallop Abdomen: soft, non-tender. Bowel sounds normal. No masses,  no organomegaly Extremities: extremities normal, atraumatic, no cyanosis or edema Data Review Recent Results (from the past 24 hour(s)) CBC WITH MANUAL DIFF Collection Time: 04/21/19  3:46 AM  
Result Value Ref Range WBC 9.6 3.6 - 11.0 K/uL  
 RBC 2.31 (L) 3.80 - 5.20 M/uL HGB 7.2 (L) 11.5 - 16.0 g/dL HCT 21.2 (L) 35.0 - 47.0 % MCV 91.8 80.0 - 99.0 FL  
 MCH 31.2 26.0 - 34.0 PG  
 MCHC 34.0 30.0 - 36.5 g/dL  
 RDW 17.9 (H) 11.5 - 14.5 % PLATELET 601 145 - 049 K/uL MPV 11.1 8.9 - 12.9 FL  
 NRBC 0.4 (H) 0  WBC ABSOLUTE NRBC 0.04 (H) 0.00 - 0.01 K/uL NEUTROPHILS 26 (L) 32 - 75 % BAND NEUTROPHILS 0 0 - 6 % LYMPHOCYTES 57 (H) 12 - 49 % MONOCYTES 10 5 - 13 % EOSINOPHILS 6 0 - 7 % BASOPHILS 1 0 - 1 % METAMYELOCYTES 0 0 % MYELOCYTES 0 0 % PROMYELOCYTES 0 0 % BLASTS 0 0 % OTHER CELL 0 0 IMMATURE GRANULOCYTES 0 %  
 ABS. NEUTROPHILS 2.5 1.8 - 8.0 K/UL  
 ABS. LYMPHOCYTES 5.4 (H) 0.8 - 3.5 K/UL  
 ABS. MONOCYTES 1.0 0.0 - 1.0 K/UL  
 ABS. EOSINOPHILS 0.6 (H) 0.0 - 0.4 K/UL  
 ABS. BASOPHILS 0.1 0.0 - 0.1 K/UL  
 ABS. IMM. GRANS. 0.0 K/UL  
 DF SMEAR SCANNED    
 RBC COMMENTS POIKILOCYTOSIS 
2+ METABOLIC PANEL, COMPREHENSIVE Collection Time: 04/21/19  3:46 AM  
Result Value Ref Range Sodium 138 136 - 145 mmol/L Potassium 4.4 3.5 - 5.1 mmol/L Chloride 105 97 - 108 mmol/L  
 CO2 29 21 - 32 mmol/L Anion gap 4 (L) 5 - 15 mmol/L Glucose 95 65 - 100 mg/dL BUN 9 6 - 20 MG/DL Creatinine 0.71 0.55 - 1.02 MG/DL  
 BUN/Creatinine ratio 13 12 - 20 GFR est AA >60 >60 ml/min/1.73m2 GFR est non-AA >60 >60 ml/min/1.73m2 Calcium 8.3 (L) 8.5 - 10.1 MG/DL Bilirubin, total 1.3 (H) 0.2 - 1.0 MG/DL  
 ALT (SGPT) 17 12 - 78 U/L  
 AST (SGOT) 28 15 - 37 U/L Alk. phosphatase 105 45 - 117 U/L Protein, total 7.7 6.4 - 8.2 g/dL Albumin 3.4 (L) 3.5 - 5.0 g/dL Globulin 4.3 (H) 2.0 - 4.0 g/dL A-G Ratio 0.8 (L) 1.1 - 2.2 Assessment:  
 
Active Problems: 
  [facility] (9/8/2018) Plan: 1. Continue treatment of painful sickle crises. Hemoglobin dropped to 7.2. We will continue to monitor trend.

## 2019-04-21 NOTE — PROGRESS NOTES
Oncology End of Shift Note Bedside shift change report given to Yenifer Salgado RN (incoming nurse) by Lloyd Daniels (outgoing nurse) on [facility]. Report included the following information SBAR, Kardex, Intake/Output and MAR. Shift Summary: Pt remained stable during shift. PRN medications x2. Labs drawn. Still has not had BM. Pt wants Marilax. Issues for Physician to Address:  Pt has not had BM since admission. Miralax? Patient on Cardiac Monitoring? [x] Yes 
[] No 
 
Rhythm: Telemetry: normal sinus rhythm Lloyd Daniels

## 2019-04-21 NOTE — PROGRESS NOTES
Oncology End of Shift Note Bedside shift change report given to SAMIR Aranda (incoming nurse) by Rudolph Moncada (outgoing nurse) on [facility]. Report included the following information SBAR, Kardex and MAR. Shift Summary:  
Patient tolerated care well, complaints of pain unchanged (see MAR and Pain documentation). Spoke with MD regarding patient temperature and coverage, MD stated no further action required. MD ordered meds (see MAR) for patients constipation. Patient rested throughout shift. Continuous pulse ox ongoing. Issues for Physician to Address:   
 
Patient on Cardiac Monitoring? [x] Yes 
[] No 
 
Rhythm: Telemetry: normal sinus rhythm Shift Events Rudolph Moncada

## 2019-04-22 LAB
BASOPHILS # BLD: 0 K/UL (ref 0–0.1)
BASOPHILS NFR BLD: 0 % (ref 0–1)
BLASTS NFR BLD MANUAL: 0 %
DIFFERENTIAL METHOD BLD: ABNORMAL
EOSINOPHIL # BLD: 0.4 K/UL (ref 0–0.4)
EOSINOPHIL NFR BLD: 4 % (ref 0–7)
ERYTHROCYTE [DISTWIDTH] IN BLOOD BY AUTOMATED COUNT: 18.3 % (ref 11.5–14.5)
HCT VFR BLD AUTO: 24.2 % (ref 35–47)
HGB BLD-MCNC: 8.2 G/DL (ref 11.5–16)
IMM GRANULOCYTES # BLD AUTO: 0 K/UL
IMM GRANULOCYTES NFR BLD AUTO: 0 %
LYMPHOCYTES # BLD: 5 K/UL (ref 0.8–3.5)
LYMPHOCYTES NFR BLD: 48 % (ref 12–49)
MCH RBC QN AUTO: 31.3 PG (ref 26–34)
MCHC RBC AUTO-ENTMCNC: 33.9 G/DL (ref 30–36.5)
MCV RBC AUTO: 92.4 FL (ref 80–99)
METAMYELOCYTES NFR BLD MANUAL: 0 %
MONOCYTES # BLD: 0.6 K/UL (ref 0–1)
MONOCYTES NFR BLD: 6 % (ref 5–13)
MYELOCYTES NFR BLD MANUAL: 0 %
NEUTS BAND NFR BLD MANUAL: 6 % (ref 0–6)
NEUTS SEG # BLD: 4.3 K/UL (ref 1.8–8)
NEUTS SEG NFR BLD: 36 % (ref 32–75)
NRBC # BLD: 0.08 K/UL (ref 0–0.01)
NRBC BLD-RTO: 0.8 PER 100 WBC
OTHER CELLS NFR BLD MANUAL: 0 %
PLATELET # BLD AUTO: 210 K/UL (ref 150–400)
PMV BLD AUTO: 11 FL (ref 8.9–12.9)
PROMYELOCYTES NFR BLD MANUAL: 0 %
RBC # BLD AUTO: 2.62 M/UL (ref 3.8–5.2)
RBC MORPH BLD: ABNORMAL
WBC # BLD AUTO: 10.3 K/UL (ref 3.6–11)
WBC MORPH BLD: ABNORMAL

## 2019-04-22 PROCEDURE — 85027 COMPLETE CBC AUTOMATED: CPT

## 2019-04-22 PROCEDURE — 74011250637 HC RX REV CODE- 250/637: Performed by: INTERNAL MEDICINE

## 2019-04-22 PROCEDURE — 94760 N-INVAS EAR/PLS OXIMETRY 1: CPT

## 2019-04-22 PROCEDURE — 74011250636 HC RX REV CODE- 250/636: Performed by: INTERNAL MEDICINE

## 2019-04-22 PROCEDURE — 65660000000 HC RM CCU STEPDOWN

## 2019-04-22 PROCEDURE — 36415 COLL VENOUS BLD VENIPUNCTURE: CPT

## 2019-04-22 RX ADMIN — DEXTROSE MONOHYDRATE, SODIUM CHLORIDE, AND POTASSIUM CHLORIDE 100 ML/HR: 50; 4.5; 1.49 INJECTION, SOLUTION INTRAVENOUS at 06:24

## 2019-04-22 RX ADMIN — DEXTROSE MONOHYDRATE, SODIUM CHLORIDE, AND POTASSIUM CHLORIDE 100 ML/HR: 50; 4.5; 1.49 INJECTION, SOLUTION INTRAVENOUS at 16:08

## 2019-04-22 RX ADMIN — DIPHENHYDRAMINE HYDROCHLORIDE 25 MG: 25 CAPSULE ORAL at 18:01

## 2019-04-22 RX ADMIN — DOCUSATE SODIUM 100 MG: 100 CAPSULE, LIQUID FILLED ORAL at 17:57

## 2019-04-22 RX ADMIN — DIPHENHYDRAMINE HYDROCHLORIDE 25 MG: 25 CAPSULE ORAL at 01:03

## 2019-04-22 RX ADMIN — CITALOPRAM HYDROBROMIDE 10 MG: 20 TABLET ORAL at 17:57

## 2019-04-22 RX ADMIN — ONDANSETRON 4 MG: 4 TABLET, ORALLY DISINTEGRATING ORAL at 05:11

## 2019-04-22 RX ADMIN — DIPHENHYDRAMINE HYDROCHLORIDE 25 MG: 25 CAPSULE ORAL at 09:23

## 2019-04-22 RX ADMIN — ONDANSETRON 4 MG: 4 TABLET, ORALLY DISINTEGRATING ORAL at 22:07

## 2019-04-22 RX ADMIN — ENOXAPARIN SODIUM 40 MG: 40 INJECTION SUBCUTANEOUS at 08:19

## 2019-04-22 RX ADMIN — ONDANSETRON 4 MG: 4 TABLET, ORALLY DISINTEGRATING ORAL at 01:02

## 2019-04-22 RX ADMIN — ONDANSETRON 4 MG: 4 TABLET, ORALLY DISINTEGRATING ORAL at 13:59

## 2019-04-22 RX ADMIN — PANTOPRAZOLE SODIUM 40 MG: 40 TABLET, DELAYED RELEASE ORAL at 07:46

## 2019-04-22 RX ADMIN — DOCUSATE SODIUM 100 MG: 100 CAPSULE, LIQUID FILLED ORAL at 08:17

## 2019-04-22 RX ADMIN — HYDROMORPHONE HYDROCHLORIDE 1 MG: 1 INJECTION, SOLUTION INTRAMUSCULAR; INTRAVENOUS; SUBCUTANEOUS at 18:01

## 2019-04-22 RX ADMIN — ONDANSETRON 4 MG: 4 TABLET, ORALLY DISINTEGRATING ORAL at 09:23

## 2019-04-22 RX ADMIN — FOLIC ACID 1 MG: 1 TABLET ORAL at 08:17

## 2019-04-22 RX ADMIN — HYDROMORPHONE HYDROCHLORIDE 1 MG: 1 INJECTION, SOLUTION INTRAMUSCULAR; INTRAVENOUS; SUBCUTANEOUS at 14:00

## 2019-04-22 RX ADMIN — HYDROMORPHONE HYDROCHLORIDE 1 MG: 1 INJECTION, SOLUTION INTRAMUSCULAR; INTRAVENOUS; SUBCUTANEOUS at 01:03

## 2019-04-22 RX ADMIN — HYDROMORPHONE HYDROCHLORIDE 1 MG: 1 INJECTION, SOLUTION INTRAMUSCULAR; INTRAVENOUS; SUBCUTANEOUS at 22:07

## 2019-04-22 RX ADMIN — DIPHENHYDRAMINE HYDROCHLORIDE 25 MG: 25 CAPSULE ORAL at 05:11

## 2019-04-22 RX ADMIN — POLYETHYLENE GLYCOL 3350 17 G: 17 POWDER, FOR SOLUTION ORAL at 08:17

## 2019-04-22 RX ADMIN — DIPHENHYDRAMINE HYDROCHLORIDE 25 MG: 25 CAPSULE ORAL at 13:59

## 2019-04-22 RX ADMIN — DIPHENHYDRAMINE HYDROCHLORIDE 25 MG: 25 CAPSULE ORAL at 22:07

## 2019-04-22 RX ADMIN — HYDROMORPHONE HYDROCHLORIDE 1 MG: 1 INJECTION, SOLUTION INTRAMUSCULAR; INTRAVENOUS; SUBCUTANEOUS at 09:23

## 2019-04-22 RX ADMIN — ONDANSETRON 4 MG: 4 TABLET, ORALLY DISINTEGRATING ORAL at 18:00

## 2019-04-22 RX ADMIN — HYDROMORPHONE HYDROCHLORIDE 1 MG: 1 INJECTION, SOLUTION INTRAMUSCULAR; INTRAVENOUS; SUBCUTANEOUS at 05:11

## 2019-04-22 NOTE — PROGRESS NOTES
Oncology End of Shift Note Bedside shift change report given to Madi Brush RN (incoming nurse) by Tammy Shahid RN (outgoing nurse) on [facility]. Report included the following information SBAR, Kardex and MAR. Shift Summary: Am labs drawn, dilaudid q4hr, still no BM Issues for Physician to Address:  None Patient on Cardiac Monitoring? [x] Yes 
[] No 
 
Rhythm: NSR Shift Events Tammy Shahid RN

## 2019-04-22 NOTE — PROGRESS NOTES
Problem: Falls - Risk of 
Goal: *Absence of Falls Description Document Jeffry Fabian Fall Risk and appropriate interventions in the flowsheet. Outcome: Progressing Towards Goal 
  
Problem: Patient Education: Go to Patient Education Activity Goal: Patient/Family Education Outcome: Progressing Towards Goal 
  
Problem: Pain Goal: *Control of Pain Outcome: Progressing Towards Goal 
Goal: *PALLIATIVE CARE:  Alleviation of Pain Outcome: Progressing Towards Goal 
  
Problem: Patient Education: Go to Patient Education Activity Goal: Patient/Family Education Outcome: Progressing Towards Goal

## 2019-04-22 NOTE — PROGRESS NOTES
General Daily Progress Note Admit Date: 4/14/2019 Subjective:  
 
Patient continues to complain of pain with slight improvement. Current Facility-Administered Medications Medication Dose Route Frequency  polyethylene glycol (MIRALAX) packet 17 g  17 g Oral DAILY  dextrose 5% - 0.45% NaCl with KCl 20 mEq/L infusion  100 mL/hr IntraVENous CONTINUOUS  
 fentaNYL (DURAGESIC) 75 mcg/hr patch 1 Patch  1 Patch TransDERmal Q72H  citalopram (CELEXA) tablet 10 mg  10 mg Oral QPM  
 folic acid (FOLVITE) tablet 1 mg  1 mg Oral DAILY  pantoprazole (PROTONIX) tablet 40 mg  40 mg Oral ACB  
 HYDROmorphone (PF) (DILAUDID) injection 1 mg  1 mg IntraVENous Q3H PRN  
 acetaminophen (TYLENOL) tablet 650 mg  650 mg Oral Q4H PRN  
 naloxone (NARCAN) injection 0.4 mg  0.4 mg IntraVENous PRN  
 diphenhydrAMINE (BENADRYL) capsule 25 mg  25 mg Oral Q4H PRN  
 ondansetron (ZOFRAN ODT) tablet 4 mg  4 mg Oral Q4H PRN  
 docusate sodium (COLACE) capsule 100 mg  100 mg Oral BID  enoxaparin (LOVENOX) injection 40 mg  40 mg SubCUTAneous Q24H Review of Systems A comprehensive review of systems was negative. Objective:  
 
Patient Vitals for the past 24 hrs: 
 BP Temp Pulse Resp SpO2  
04/22/19 0804 140/74 98.8 °F (37.1 °C) 73 18 96 % 04/22/19 0314 153/77 98.9 °F (37.2 °C) 83 18 95 % 04/21/19 2343 119/71 99.1 °F (37.3 °C) 86 16 96 % 04/21/19 1943 133/87 98.9 °F (37.2 °C) 87 16 95 % 04/21/19 1522 123/64 99.5 °F (37.5 °C) 81 16 96 % 04/21/19 1138 125/64 99.3 °F (37.4 °C) 83 16 96 % No intake/output data recorded. 04/20 1901 - 04/22 0700 In: 1698.3 [I.V.:1698.3] Out: - Physical Exam:  
Visit Vitals /74 (BP 1 Location: Right arm, BP Patient Position: At rest) Pulse 73 Temp 98.8 °F (37.1 °C) Resp 18 Ht 5' 11\" (1.803 m) Wt 189 lb 13.1 oz (86.1 kg) SpO2 96% BMI 26.47 kg/m² General appearance: alert, cooperative, no distress, appears stated age Neck: supple, symmetrical, trachea midline, no adenopathy, thyroid: not enlarged, symmetric, no tenderness/mass/nodules, no carotid bruit and no JVD Lungs: clear to auscultation bilaterally Heart: regular rate and rhythm, S1, S2 normal, no murmur, click, rub or gallop Abdomen: soft, non-tender. Bowel sounds normal. No masses,  no organomegaly Extremities: extremities normal, atraumatic, no cyanosis or edema Data Review Recent Results (from the past 24 hour(s)) CBC WITH MANUAL DIFF Collection Time: 04/22/19  5:14 AM  
Result Value Ref Range WBC 10.3 3.6 - 11.0 K/uL  
 RBC 2.62 (L) 3.80 - 5.20 M/uL HGB 8.2 (L) 11.5 - 16.0 g/dL HCT 24.2 (L) 35.0 - 47.0 % MCV 92.4 80.0 - 99.0 FL  
 MCH 31.3 26.0 - 34.0 PG  
 MCHC 33.9 30.0 - 36.5 g/dL  
 RDW 18.3 (H) 11.5 - 14.5 % PLATELET 383 401 - 476 K/uL MPV 11.0 8.9 - 12.9 FL  
 NRBC 0.8 (H) 0  WBC ABSOLUTE NRBC 0.08 (H) 0.00 - 0.01 K/uL NEUTROPHILS PENDING % LYMPHOCYTES PENDING % MONOCYTES PENDING % EOSINOPHILS PENDING % BASOPHILS PENDING % IMMATURE GRANULOCYTES PENDING % BAND NEUTROPHILS PENDING % PROMYELOCYTES PENDING % MYELOCYTES PENDING % METAMYELOCYTES PENDING % BLASTS PENDING % OTHER CELL PENDING   
 ABS. NEUTROPHILS PENDING K/UL  
 ABS. LYMPHOCYTES PENDING K/UL  
 ABS. MONOCYTES PENDING K/UL  
 ABS. EOSINOPHILS PENDING K/UL  
 ABS. BASOPHILS PENDING K/UL  
 ABS. IMM. GRANS. PENDING K/UL  
 RBC COMMENTS PENDING   
 DIFFERENTIAL PENDING Assessment:  
 
Active Problems: 
  Sickle cell pain crisis ([facility] (9/8/2018) Plan: 1. Continue treatment of painful crises. Hemoglobin is now 8.2. No transfusion required. 2.  Will mobilize.

## 2019-04-22 NOTE — PROGRESS NOTES
Oncology End of Shift Note Bedside shift change report given to Manoj RN (incoming nurse) by iLzzy Tony (outgoing nurse) on [facility]. Report included the following information SBAR, Kardex, Intake/Output, MAR and Accordion. Shift Summary: patient remained stable throughout the shift. PRNs (zofran, benadryl, dilaudid) given x3 Issues for Physician to Address:   
 
Patient on Cardiac Monitoring? [x] Yes 
[] No 
 
Rhythm: Telemetry: normal sinus rhythm Shift Events Lizzy Tony

## 2019-04-22 NOTE — PROGRESS NOTES
Problem: Falls - Risk of 
Goal: *Absence of Falls Description Document J[place]valeriagustavo [place] Fall Risk and appropriate interventions in the flowsheet. Outcome: Progressing Towards Goal 
  
Problem: Pain Goal: *Control of Pain Outcome: Progressing Towards Goal 
Goal: *PALLIATIVE CARE:  Alleviation of Pain Outcome: Progressing Towards Goal

## 2019-04-23 PROCEDURE — 74011250636 HC RX REV CODE- 250/636: Performed by: INTERNAL MEDICINE

## 2019-04-23 PROCEDURE — 94760 N-INVAS EAR/PLS OXIMETRY 1: CPT

## 2019-04-23 PROCEDURE — 65660000000 HC RM CCU STEPDOWN

## 2019-04-23 PROCEDURE — 74011250637 HC RX REV CODE- 250/637: Performed by: INTERNAL MEDICINE

## 2019-04-23 RX ADMIN — DOCUSATE SODIUM 100 MG: 100 CAPSULE, LIQUID FILLED ORAL at 08:57

## 2019-04-23 RX ADMIN — ONDANSETRON 4 MG: 4 TABLET, ORALLY DISINTEGRATING ORAL at 18:08

## 2019-04-23 RX ADMIN — HYDROMORPHONE HYDROCHLORIDE 1 MG: 1 INJECTION, SOLUTION INTRAMUSCULAR; INTRAVENOUS; SUBCUTANEOUS at 01:54

## 2019-04-23 RX ADMIN — DIPHENHYDRAMINE HYDROCHLORIDE 25 MG: 25 CAPSULE ORAL at 22:07

## 2019-04-23 RX ADMIN — DEXTROSE MONOHYDRATE, SODIUM CHLORIDE, AND POTASSIUM CHLORIDE 100 ML/HR: 50; 4.5; 1.49 INJECTION, SOLUTION INTRAVENOUS at 01:53

## 2019-04-23 RX ADMIN — ONDANSETRON 4 MG: 4 TABLET, ORALLY DISINTEGRATING ORAL at 22:06

## 2019-04-23 RX ADMIN — DIPHENHYDRAMINE HYDROCHLORIDE 25 MG: 25 CAPSULE ORAL at 18:09

## 2019-04-23 RX ADMIN — HYDROMORPHONE HYDROCHLORIDE 1 MG: 1 INJECTION, SOLUTION INTRAMUSCULAR; INTRAVENOUS; SUBCUTANEOUS at 14:10

## 2019-04-23 RX ADMIN — DIPHENHYDRAMINE HYDROCHLORIDE 25 MG: 25 CAPSULE ORAL at 01:54

## 2019-04-23 RX ADMIN — DIPHENHYDRAMINE HYDROCHLORIDE 25 MG: 25 CAPSULE ORAL at 14:10

## 2019-04-23 RX ADMIN — DIPHENHYDRAMINE HYDROCHLORIDE 25 MG: 25 CAPSULE ORAL at 06:04

## 2019-04-23 RX ADMIN — HYDROMORPHONE HYDROCHLORIDE 1 MG: 1 INJECTION, SOLUTION INTRAMUSCULAR; INTRAVENOUS; SUBCUTANEOUS at 10:07

## 2019-04-23 RX ADMIN — ONDANSETRON 4 MG: 4 TABLET, ORALLY DISINTEGRATING ORAL at 01:54

## 2019-04-23 RX ADMIN — ONDANSETRON 4 MG: 4 TABLET, ORALLY DISINTEGRATING ORAL at 14:10

## 2019-04-23 RX ADMIN — HYDROMORPHONE HYDROCHLORIDE 1 MG: 1 INJECTION, SOLUTION INTRAMUSCULAR; INTRAVENOUS; SUBCUTANEOUS at 06:04

## 2019-04-23 RX ADMIN — HYDROMORPHONE HYDROCHLORIDE 1 MG: 1 INJECTION, SOLUTION INTRAMUSCULAR; INTRAVENOUS; SUBCUTANEOUS at 22:07

## 2019-04-23 RX ADMIN — DOCUSATE SODIUM 100 MG: 100 CAPSULE, LIQUID FILLED ORAL at 18:09

## 2019-04-23 RX ADMIN — DEXTROSE MONOHYDRATE, SODIUM CHLORIDE, AND POTASSIUM CHLORIDE 100 ML/HR: 50; 4.5; 1.49 INJECTION, SOLUTION INTRAVENOUS at 11:14

## 2019-04-23 RX ADMIN — DIPHENHYDRAMINE HYDROCHLORIDE 25 MG: 25 CAPSULE ORAL at 10:07

## 2019-04-23 RX ADMIN — FOLIC ACID 1 MG: 1 TABLET ORAL at 08:57

## 2019-04-23 RX ADMIN — ONDANSETRON 4 MG: 4 TABLET, ORALLY DISINTEGRATING ORAL at 10:07

## 2019-04-23 RX ADMIN — ENOXAPARIN SODIUM 40 MG: 40 INJECTION SUBCUTANEOUS at 08:57

## 2019-04-23 RX ADMIN — PANTOPRAZOLE SODIUM 40 MG: 40 TABLET, DELAYED RELEASE ORAL at 08:57

## 2019-04-23 RX ADMIN — CITALOPRAM HYDROBROMIDE 10 MG: 20 TABLET ORAL at 18:09

## 2019-04-23 RX ADMIN — POLYETHYLENE GLYCOL 3350 17 G: 17 POWDER, FOR SOLUTION ORAL at 08:57

## 2019-04-23 RX ADMIN — HYDROMORPHONE HYDROCHLORIDE 1 MG: 1 INJECTION, SOLUTION INTRAMUSCULAR; INTRAVENOUS; SUBCUTANEOUS at 18:08

## 2019-04-23 RX ADMIN — ONDANSETRON 4 MG: 4 TABLET, ORALLY DISINTEGRATING ORAL at 06:04

## 2019-04-23 NOTE — PROGRESS NOTES
Oncology End of Shift Note Bedside shift change report given to Mara Leyva RN (incoming nurse) by Gomez Vitale RN (outgoing nurse) on [facility]. Report included the following information SBAR, Kardex and MAR. Shift Summary: Dilaudid x3, Pain between a 6-7, still no BM, no labs ordered this morning Issues for Physician to Address:  None Patient on Cardiac Monitoring? [x] Yes 
[] No 
 
Rhythm: Telemetry: normal sinus rhythm Shift Events Gomez Vitale RN

## 2019-04-23 NOTE — PROGRESS NOTES
Problem: Falls - Risk of 
Goal: *Absence of Falls Description Document Sarahi Mckeon Fall Risk and appropriate interventions in the flowsheet. Outcome: Progressing Towards Goal 
  
Problem: Pain Goal: *Control of Pain Outcome: Progressing Towards Goal 
Goal: *PALLIATIVE CARE:  Alleviation of Pain Outcome: Progressing Towards Goal

## 2019-04-23 NOTE — PROGRESS NOTES
General Daily Progress Note Admit Date: 4/14/2019 Subjective:  
 
Patient continues to complain of pain. Current Facility-Administered Medications Medication Dose Route Frequency  polyethylene glycol (MIRALAX) packet 17 g  17 g Oral DAILY  dextrose 5% - 0.45% NaCl with KCl 20 mEq/L infusion  100 mL/hr IntraVENous CONTINUOUS  
 fentaNYL (DURAGESIC) 75 mcg/hr patch 1 Patch  1 Patch TransDERmal Q72H  citalopram (CELEXA) tablet 10 mg  10 mg Oral QPM  
 folic acid (FOLVITE) tablet 1 mg  1 mg Oral DAILY  pantoprazole (PROTONIX) tablet 40 mg  40 mg Oral ACB  
 HYDROmorphone (PF) (DILAUDID) injection 1 mg  1 mg IntraVENous Q3H PRN  
 acetaminophen (TYLENOL) tablet 650 mg  650 mg Oral Q4H PRN  
 naloxone (NARCAN) injection 0.4 mg  0.4 mg IntraVENous PRN  
 diphenhydrAMINE (BENADRYL) capsule 25 mg  25 mg Oral Q4H PRN  
 ondansetron (ZOFRAN ODT) tablet 4 mg  4 mg Oral Q4H PRN  
 docusate sodium (COLACE) capsule 100 mg  100 mg Oral BID  enoxaparin (LOVENOX) injection 40 mg  40 mg SubCUTAneous Q24H Review of Systems A comprehensive review of systems was negative. Objective:  
 
Patient Vitals for the past 24 hrs: 
 BP Temp Pulse Resp SpO2  
04/23/19 1111 144/71 99.3 °F (37.4 °C) 81 16 94 % 04/23/19 0830 123/71 99 °F (37.2 °C) 88 16 96 % 04/22/19 2341 125/79 99.4 °F (37.4 °C) 83 18 95 % 04/22/19 1952 124/62 99.8 °F (37.7 °C) 85 18 96 % 04/22/19 1528 145/72 99.6 °F (37.6 °C) 81 18 97 % No intake/output data recorded. No intake/output data recorded. Physical Exam:  
Visit Vitals /71 (BP 1 Location: Right arm, BP Patient Position: Sitting) Pulse 81 Temp 99.3 °F (37.4 °C) Resp 16 Ht 5' 11\" (1.803 m) Wt 189 lb 13.1 oz (86.1 kg) SpO2 94% BMI 26.47 kg/m² General appearance: alert, cooperative, no distress, appears stated age Neck: supple, symmetrical, trachea midline, no adenopathy, thyroid: not enlarged, symmetric, no tenderness/mass/nodules, no carotid bruit and no JVD Lungs: clear to auscultation bilaterally Heart: regular rate and rhythm, S1, S2 normal, no murmur, click, rub or gallop Extremities: extremities normal, atraumatic, no cyanosis or edema Data Review No results found for this or any previous visit (from the past 24 hour(s)). Assessment:  
 
Active Problems: 
  Sickle cell pain crisis ([facility] (9/8/2018) Plan: 1. Continue treatment of painful sickle crises.

## 2019-04-23 NOTE — PROGRESS NOTES
Problem: Falls - Risk of 
Goal: *Absence of Falls Description Document Kevin Hernandez Fall Risk and appropriate interventions in the flowsheet. Outcome: Progressing Towards Goal 
  
Problem: Pain Goal: *Control of Pain Outcome: Progressing Towards Goal 
Goal: *PALLIATIVE CARE:  Alleviation of Pain Outcome: Progressing Towards Goal 
  
Problem: Patient Education: Go to Patient Education Activity Goal: Patient/Family Education Outcome: Progressing Towards Goal

## 2019-04-24 PROCEDURE — 74011250636 HC RX REV CODE- 250/636: Performed by: INTERNAL MEDICINE

## 2019-04-24 PROCEDURE — 65660000000 HC RM CCU STEPDOWN

## 2019-04-24 PROCEDURE — 74011250637 HC RX REV CODE- 250/637: Performed by: INTERNAL MEDICINE

## 2019-04-24 PROCEDURE — 94760 N-INVAS EAR/PLS OXIMETRY 1: CPT

## 2019-04-24 RX ADMIN — ONDANSETRON 4 MG: 4 TABLET, ORALLY DISINTEGRATING ORAL at 18:54

## 2019-04-24 RX ADMIN — ONDANSETRON 4 MG: 4 TABLET, ORALLY DISINTEGRATING ORAL at 14:53

## 2019-04-24 RX ADMIN — ONDANSETRON 4 MG: 4 TABLET, ORALLY DISINTEGRATING ORAL at 06:37

## 2019-04-24 RX ADMIN — CITALOPRAM HYDROBROMIDE 10 MG: 20 TABLET ORAL at 18:19

## 2019-04-24 RX ADMIN — HYDROMORPHONE HYDROCHLORIDE 1 MG: 1 INJECTION, SOLUTION INTRAMUSCULAR; INTRAVENOUS; SUBCUTANEOUS at 14:53

## 2019-04-24 RX ADMIN — DIPHENHYDRAMINE HYDROCHLORIDE 25 MG: 25 CAPSULE ORAL at 14:53

## 2019-04-24 RX ADMIN — HYDROMORPHONE HYDROCHLORIDE 1 MG: 1 INJECTION, SOLUTION INTRAMUSCULAR; INTRAVENOUS; SUBCUTANEOUS at 02:25

## 2019-04-24 RX ADMIN — HYDROMORPHONE HYDROCHLORIDE 1 MG: 1 INJECTION, SOLUTION INTRAMUSCULAR; INTRAVENOUS; SUBCUTANEOUS at 18:54

## 2019-04-24 RX ADMIN — ONDANSETRON 4 MG: 4 TABLET, ORALLY DISINTEGRATING ORAL at 22:59

## 2019-04-24 RX ADMIN — ONDANSETRON 4 MG: 4 TABLET, ORALLY DISINTEGRATING ORAL at 02:24

## 2019-04-24 RX ADMIN — DEXTROSE MONOHYDRATE, SODIUM CHLORIDE, AND POTASSIUM CHLORIDE 100 ML/HR: 50; 4.5; 1.49 INJECTION, SOLUTION INTRAVENOUS at 01:26

## 2019-04-24 RX ADMIN — DIPHENHYDRAMINE HYDROCHLORIDE 25 MG: 25 CAPSULE ORAL at 18:54

## 2019-04-24 RX ADMIN — DIPHENHYDRAMINE HYDROCHLORIDE 25 MG: 25 CAPSULE ORAL at 22:59

## 2019-04-24 RX ADMIN — DOCUSATE SODIUM 100 MG: 100 CAPSULE, LIQUID FILLED ORAL at 08:03

## 2019-04-24 RX ADMIN — DOCUSATE SODIUM 100 MG: 100 CAPSULE, LIQUID FILLED ORAL at 18:19

## 2019-04-24 RX ADMIN — ENOXAPARIN SODIUM 40 MG: 40 INJECTION SUBCUTANEOUS at 08:03

## 2019-04-24 RX ADMIN — DIPHENHYDRAMINE HYDROCHLORIDE 25 MG: 25 CAPSULE ORAL at 11:08

## 2019-04-24 RX ADMIN — DIPHENHYDRAMINE HYDROCHLORIDE 25 MG: 25 CAPSULE ORAL at 06:37

## 2019-04-24 RX ADMIN — ONDANSETRON 4 MG: 4 TABLET, ORALLY DISINTEGRATING ORAL at 11:08

## 2019-04-24 RX ADMIN — DIPHENHYDRAMINE HYDROCHLORIDE 25 MG: 25 CAPSULE ORAL at 02:24

## 2019-04-24 RX ADMIN — FOLIC ACID 1 MG: 1 TABLET ORAL at 08:03

## 2019-04-24 RX ADMIN — POLYETHYLENE GLYCOL 3350 17 G: 17 POWDER, FOR SOLUTION ORAL at 08:03

## 2019-04-24 RX ADMIN — HYDROMORPHONE HYDROCHLORIDE 1 MG: 1 INJECTION, SOLUTION INTRAMUSCULAR; INTRAVENOUS; SUBCUTANEOUS at 11:08

## 2019-04-24 RX ADMIN — HYDROMORPHONE HYDROCHLORIDE 1 MG: 1 INJECTION, SOLUTION INTRAMUSCULAR; INTRAVENOUS; SUBCUTANEOUS at 06:37

## 2019-04-24 RX ADMIN — HYDROMORPHONE HYDROCHLORIDE 1 MG: 1 INJECTION, SOLUTION INTRAMUSCULAR; INTRAVENOUS; SUBCUTANEOUS at 22:58

## 2019-04-24 RX ADMIN — PANTOPRAZOLE SODIUM 40 MG: 40 TABLET, DELAYED RELEASE ORAL at 08:03

## 2019-04-24 NOTE — PROGRESS NOTES
Oncology End of Shift Note Bedside shift change report given to Segundo Boo RN (incoming nurse) by Liban Montano (outgoing nurse) on [facility]. Report included the following information SBAR, Kardex, ED Summary, Intake/Output, MAR and Cardiac Rhythm sinus. Shift Summary:  
No change in patient condition throughout shift. Patient complained of pain in the hips and knees bilaterally throughout the night. PRN pain medication given. Issues for Physician to Address:  none Patient on Cardiac Monitoring? [x] Yes 
[] No 
 
Rhythm: sinus Shift Events Liban Montano

## 2019-04-24 NOTE — INTERDISCIPLINARY ROUNDS
Oncology Interdisciplinary rounds were held today to discuss patient plan of care and outcomes. The following members were present: Nursing, Physician, Case Management, Pharmacy, and PT/OT Actual Length of Stay: 10 DRG GLOS: 2.7 Expected Length of Stay: 2d 16h Plan            Discharge Pain control home

## 2019-04-24 NOTE — PROGRESS NOTES
Problem: Falls - Risk of 
Goal: *Absence of Falls Description Document Tricia Christianson Fall Risk and appropriate interventions in the flowsheet. Outcome: Progressing Towards Goal 
  
Problem: Pain Goal: *Control of Pain Outcome: Progressing Towards Goal 
  
Problem: Patient Education: Go to Patient Education Activity Goal: Patient/Family Education Outcome: Progressing Towards Goal 
  
Problem: Pain Goal: *PALLIATIVE CARE:  Alleviation of Pain Outcome: Progressing Towards Goal

## 2019-04-24 NOTE — PROGRESS NOTES
Spiritual Care Assessment/Progress Note [address] 
 
 
NAME: Tyler Sterling      MRN: [id number] AGE: 66 y.o. SEX: female Sikh Affiliation: Non Mormon  
Language: [place] 4/24/2019     Total Time (in minutes): 15 Spiritual Assessment begun in [facility] through conversation with: 
  
    [x]Patient        [] Family    [] Friend(s) Reason for Consult: Initial/Spiritual assessment, patient floor Spiritual beliefs: (Please include comment if needed) 
   [] Identifies with a zay tradition:     
   [] Supported by a zay community:        
   [] Claims no spiritual orientation:       
   [] Seeking spiritual identity:            
   [x] Adheres to an individual form of spirituality:       
   [] Not able to assess:                   
 
    
Identified resources for coping:  
   [] Prayer                           
   [] Music                  [] Guided Imagery [x] Family/friends                 [] Pet visits [] Devotional reading                         [] Unknown 
   [] Other:                                          
 
 
Interventions offered during this visit: (See comments for more details) Patient Interventions: Affirmation of emotions/emotional suffering, Affirmation of zay, Initial visit, Initial/Spiritual assessment, patient floor, Normalization of emotional/spiritual concerns Plan of Care: 
 
 [x] Support spiritual and/or cultural needs  
 [] Support AMD and/or advance care planning process    
 [] Support grieving process 
 [] Coordinate Rites and/or Rituals  
 [] Coordination with community clergy [] No spiritual needs identified at this time 
 [] Detailed Plan of Care below (See Comments)  [] Make referral to Music Therapy 
[] Make referral to Pet Therapy    
[] Make referral to Addiction services 
[] Make referral to [facility] 
[] Make referral to Spiritual Care Partner [] No future visits requested       
[x] Follow up visits as needed Comments:  visited patients room on Oncology to do initial visit. Patient had just finished breakfast and the tray was still on the table.  thinks he woke up the patient as she said she was sleepy because she didn't sleep to well the night before. Her support system is good as she was a daughter and brother that are very helpful. She said she still drives when she can, but sometimes the pain is too much and my daughter or brother will drive me. Mrs. Elizabeth Ball does not attend a Christian at this time, but has in the past. She is glad to say she still lives alone.  provided pastoral support and care to the patient. Spiritual Care will follow up as needed. Khalida Steel Pager: [phone]

## 2019-04-25 LAB
BASOPHILS # BLD: 0.2 K/UL (ref 0–0.1)
BASOPHILS NFR BLD: 2 % (ref 0–1)
BLASTS NFR BLD MANUAL: 0 %
DIFFERENTIAL METHOD BLD: ABNORMAL
EOSINOPHIL # BLD: 1.1 K/UL (ref 0–0.4)
EOSINOPHIL NFR BLD: 11 % (ref 0–7)
ERYTHROCYTE [DISTWIDTH] IN BLOOD BY AUTOMATED COUNT: 19.4 % (ref 11.5–14.5)
HCT VFR BLD AUTO: 23.3 % (ref 35–47)
HGB BLD-MCNC: 7.9 G/DL (ref 11.5–16)
IMM GRANULOCYTES # BLD AUTO: 0 K/UL
IMM GRANULOCYTES NFR BLD AUTO: 0 %
LYMPHOCYTES # BLD: 4.4 K/UL (ref 0.8–3.5)
LYMPHOCYTES NFR BLD: 45 % (ref 12–49)
MCH RBC QN AUTO: 31.7 PG (ref 26–34)
MCHC RBC AUTO-ENTMCNC: 33.9 G/DL (ref 30–36.5)
MCV RBC AUTO: 93.6 FL (ref 80–99)
METAMYELOCYTES NFR BLD MANUAL: 1 %
MONOCYTES # BLD: 1 K/UL (ref 0–1)
MONOCYTES NFR BLD: 10 % (ref 5–13)
MYELOCYTES NFR BLD MANUAL: 0 %
NEUTS BAND NFR BLD MANUAL: 2 % (ref 0–6)
NEUTS SEG # BLD: 3 K/UL (ref 1.8–8)
NEUTS SEG NFR BLD: 29 % (ref 32–75)
NRBC # BLD: 0.14 K/UL (ref 0–0.01)
NRBC BLD-RTO: 1.4 PER 100 WBC
OTHER CELLS NFR BLD MANUAL: 0 %
PLATELET # BLD AUTO: 182 K/UL (ref 150–400)
PMV BLD AUTO: 11.3 FL (ref 8.9–12.9)
PROMYELOCYTES NFR BLD MANUAL: 0 %
RBC # BLD AUTO: 2.49 M/UL (ref 3.8–5.2)
RBC MORPH BLD: ABNORMAL
WBC # BLD AUTO: 9.8 K/UL (ref 3.6–11)
WBC MORPH BLD: ABNORMAL

## 2019-04-25 PROCEDURE — 36415 COLL VENOUS BLD VENIPUNCTURE: CPT

## 2019-04-25 PROCEDURE — 74011250636 HC RX REV CODE- 250/636: Performed by: INTERNAL MEDICINE

## 2019-04-25 PROCEDURE — 74011250637 HC RX REV CODE- 250/637: Performed by: INTERNAL MEDICINE

## 2019-04-25 PROCEDURE — 65660000000 HC RM CCU STEPDOWN

## 2019-04-25 PROCEDURE — 85027 COMPLETE CBC AUTOMATED: CPT

## 2019-04-25 RX ADMIN — ONDANSETRON 4 MG: 4 TABLET, ORALLY DISINTEGRATING ORAL at 21:29

## 2019-04-25 RX ADMIN — HYDROMORPHONE HYDROCHLORIDE 1 MG: 1 INJECTION, SOLUTION INTRAMUSCULAR; INTRAVENOUS; SUBCUTANEOUS at 16:48

## 2019-04-25 RX ADMIN — DIPHENHYDRAMINE HYDROCHLORIDE 25 MG: 25 CAPSULE ORAL at 03:10

## 2019-04-25 RX ADMIN — DIPHENHYDRAMINE HYDROCHLORIDE 25 MG: 25 CAPSULE ORAL at 11:40

## 2019-04-25 RX ADMIN — DEXTROSE MONOHYDRATE, SODIUM CHLORIDE, AND POTASSIUM CHLORIDE 100 ML/HR: 50; 4.5; 1.49 INJECTION, SOLUTION INTRAVENOUS at 21:29

## 2019-04-25 RX ADMIN — ONDANSETRON 4 MG: 4 TABLET, ORALLY DISINTEGRATING ORAL at 03:10

## 2019-04-25 RX ADMIN — DIPHENHYDRAMINE HYDROCHLORIDE 25 MG: 25 CAPSULE ORAL at 21:29

## 2019-04-25 RX ADMIN — ONDANSETRON 4 MG: 4 TABLET, ORALLY DISINTEGRATING ORAL at 16:49

## 2019-04-25 RX ADMIN — DIPHENHYDRAMINE HYDROCHLORIDE 25 MG: 25 CAPSULE ORAL at 16:49

## 2019-04-25 RX ADMIN — ONDANSETRON 4 MG: 4 TABLET, ORALLY DISINTEGRATING ORAL at 11:41

## 2019-04-25 RX ADMIN — DOCUSATE SODIUM 100 MG: 100 CAPSULE, LIQUID FILLED ORAL at 08:47

## 2019-04-25 RX ADMIN — HYDROMORPHONE HYDROCHLORIDE 1 MG: 1 INJECTION, SOLUTION INTRAMUSCULAR; INTRAVENOUS; SUBCUTANEOUS at 07:27

## 2019-04-25 RX ADMIN — DEXTROSE MONOHYDRATE, SODIUM CHLORIDE, AND POTASSIUM CHLORIDE 100 ML/HR: 50; 4.5; 1.49 INJECTION, SOLUTION INTRAVENOUS at 19:05

## 2019-04-25 RX ADMIN — HYDROMORPHONE HYDROCHLORIDE 1 MG: 1 INJECTION, SOLUTION INTRAMUSCULAR; INTRAVENOUS; SUBCUTANEOUS at 03:11

## 2019-04-25 RX ADMIN — ONDANSETRON 4 MG: 4 TABLET, ORALLY DISINTEGRATING ORAL at 07:27

## 2019-04-25 RX ADMIN — FOLIC ACID 1 MG: 1 TABLET ORAL at 08:47

## 2019-04-25 RX ADMIN — ENOXAPARIN SODIUM 40 MG: 40 INJECTION SUBCUTANEOUS at 08:47

## 2019-04-25 RX ADMIN — DOCUSATE SODIUM 100 MG: 100 CAPSULE, LIQUID FILLED ORAL at 17:58

## 2019-04-25 RX ADMIN — PANTOPRAZOLE SODIUM 40 MG: 40 TABLET, DELAYED RELEASE ORAL at 07:27

## 2019-04-25 RX ADMIN — HYDROMORPHONE HYDROCHLORIDE 1 MG: 1 INJECTION, SOLUTION INTRAMUSCULAR; INTRAVENOUS; SUBCUTANEOUS at 11:41

## 2019-04-25 RX ADMIN — CITALOPRAM HYDROBROMIDE 10 MG: 20 TABLET ORAL at 17:57

## 2019-04-25 RX ADMIN — DEXTROSE MONOHYDRATE, SODIUM CHLORIDE, AND POTASSIUM CHLORIDE 100 ML/HR: 50; 4.5; 1.49 INJECTION, SOLUTION INTRAVENOUS at 08:05

## 2019-04-25 RX ADMIN — HYDROMORPHONE HYDROCHLORIDE 1 MG: 1 INJECTION, SOLUTION INTRAMUSCULAR; INTRAVENOUS; SUBCUTANEOUS at 21:29

## 2019-04-25 RX ADMIN — POLYETHYLENE GLYCOL 3350 17 G: 17 POWDER, FOR SOLUTION ORAL at 08:47

## 2019-04-25 RX ADMIN — DIPHENHYDRAMINE HYDROCHLORIDE 25 MG: 25 CAPSULE ORAL at 07:27

## 2019-04-25 NOTE — PROGRESS NOTES
Oncology End of Shift Note Bedside shift change report given to SAMIR white (incoming nurse) by Mariangel Larson (outgoing nurse) on [facility]. Report included the following information SBAR, Kardex, ED Summary, Intake/Output and MAR. Shift Summary:  
No change in patient condition throughout shift. Patient complained of pain throughout shift. PRN pain medications given. Patient stayed awake most of the night. Issues for Physician to Address:  none Patient on Cardiac Monitoring? [x] Yes 
[] No 
 
Rhythm: Telemetry: normal sinus rhythm Shift Events Mariangel Larson

## 2019-04-25 NOTE — PROGRESS NOTES
General Daily Progress Note Admit Date: 4/14/2019 Subjective:  
 
Patient continues to complain of pain. Current Facility-Administered Medications Medication Dose Route Frequency  lactulose (CHRONULAC) 10 gram/15 mL solution 30 mL  20 g Oral PRN  polyethylene glycol (MIRALAX) packet 17 g  17 g Oral DAILY  dextrose 5% - 0.45% NaCl with KCl 20 mEq/L infusion  100 mL/hr IntraVENous CONTINUOUS  
 fentaNYL (DURAGESIC) 75 mcg/hr patch 1 Patch  1 Patch TransDERmal Q72H  citalopram (CELEXA) tablet 10 mg  10 mg Oral QPM  
 folic acid (FOLVITE) tablet 1 mg  1 mg Oral DAILY  pantoprazole (PROTONIX) tablet 40 mg  40 mg Oral ACB  
 HYDROmorphone (PF) (DILAUDID) injection 1 mg  1 mg IntraVENous Q3H PRN  
 acetaminophen (TYLENOL) tablet 650 mg  650 mg Oral Q4H PRN  
 naloxone (NARCAN) injection 0.4 mg  0.4 mg IntraVENous PRN  
 diphenhydrAMINE (BENADRYL) capsule 25 mg  25 mg Oral Q4H PRN  
 ondansetron (ZOFRAN ODT) tablet 4 mg  4 mg Oral Q4H PRN  
 docusate sodium (COLACE) capsule 100 mg  100 mg Oral BID  enoxaparin (LOVENOX) injection 40 mg  40 mg SubCUTAneous Q24H Review of Systems A comprehensive review of systems was negative. Objective:  
 
Patient Vitals for the past 24 hrs: 
 BP Temp Pulse Resp SpO2  
04/25/19 1556 125/62 99.4 °F (37.4 °C) 83 18 97 % 04/25/19 1519  99.1 °F (37.3 °C)     
04/25/19 1139 132/73 99.4 °F (37.4 °C) 84 16 93 % 04/25/19 0741 153/80 99.5 °F (37.5 °C) 90 18 96 % 04/25/19 0241 120/74 98.5 °F (36.9 °C) 89 18 95 % 04/24/19 2254 134/72 98.9 °F (37.2 °C) 93 18 96 % 04/24/19 1942 150/71 99.4 °F (37.4 °C) 96 18 92 % No intake/output data recorded. No intake/output data recorded. Physical Exam:  
Visit Vitals /62 Pulse 83 Temp 99.4 °F (37.4 °C) Resp 18 Ht 5' 11\" (1.803 m) Wt 189 lb 13.1 oz (86.1 kg) SpO2 97% BMI 26.47 kg/m² General appearance: alert, cooperative, no distress, appears stated age Neck: supple, symmetrical, trachea midline, no adenopathy, thyroid: not enlarged, symmetric, no tenderness/mass/nodules, no carotid bruit and no JVD Lungs: clear to auscultation bilaterally Heart: regular rate and rhythm, S1, S2 normal, no murmur, click, rub or gallop Abdomen: soft, non-tender. Bowel sounds normal. No masses,  no organomegaly Extremities: extremities normal, atraumatic, no cyanosis or edema Data Review No results found for this or any previous visit (from the past 24 hour(s)). Assessment:  
 
Active Problems: 
  Sickle cell pain crisis ([facility] [address] (9/8/2018) Plan: 1. Continue treatment of painful crises. Labs in a.m. 
2.  Will mobilize.

## 2019-04-25 NOTE — PROGRESS NOTES
Problem: Falls - Risk of 
Goal: *Absence of Falls Description Document Bishop Mckenzie Fall Risk and appropriate interventions in the flowsheet.  
Outcome: Progressing Towards Goal

## 2019-04-25 NOTE — PROGRESS NOTES
Oncology End of Shift Note Bedside shift change report given to SAMIR Griffin (incoming nurse) by Yrn Stephenson (outgoing nurse) on [facility]. Report included the following information SBAR, Kardex and MAR. Shift Summary:  
 
Tolerated care well, complaints of pain unchanged. Still no BM. Issues for Physician to Address:  
 
Patient on Cardiac Monitoring? [x] Yes 
[] No 
 
Rhythm: Telemetry: normal sinus rhythm Shift Events Storm Glaze

## 2019-04-26 LAB
ALBUMIN SERPL-MCNC: 3.5 G/DL (ref 3.5–5)
ALBUMIN/GLOB SERPL: 0.7 {RATIO} (ref 1.1–2.2)
ALP SERPL-CCNC: 116 U/L (ref 45–117)
ALT SERPL-CCNC: 20 U/L (ref 12–78)
ANION GAP SERPL CALC-SCNC: 2 MMOL/L (ref 5–15)
AST SERPL-CCNC: 38 U/L (ref 15–37)
BILIRUB SERPL-MCNC: 1.2 MG/DL (ref 0.2–1)
BUN SERPL-MCNC: 8 MG/DL (ref 6–20)
BUN/CREAT SERPL: 11 (ref 12–20)
CALCIUM SERPL-MCNC: 8.4 MG/DL (ref 8.5–10.1)
CHLORIDE SERPL-SCNC: 102 MMOL/L (ref 97–108)
CO2 SERPL-SCNC: 32 MMOL/L (ref 21–32)
CREAT SERPL-MCNC: 0.72 MG/DL (ref 0.55–1.02)
GLOBULIN SER CALC-MCNC: 4.8 G/DL (ref 2–4)
GLUCOSE SERPL-MCNC: 113 MG/DL (ref 65–100)
POTASSIUM SERPL-SCNC: 4.1 MMOL/L (ref 3.5–5.1)
PROT SERPL-MCNC: 8.3 G/DL (ref 6.4–8.2)
SODIUM SERPL-SCNC: 136 MMOL/L (ref 136–145)

## 2019-04-26 PROCEDURE — 80053 COMPREHEN METABOLIC PANEL: CPT

## 2019-04-26 PROCEDURE — 74011250636 HC RX REV CODE- 250/636: Performed by: INTERNAL MEDICINE

## 2019-04-26 PROCEDURE — 65660000000 HC RM CCU STEPDOWN

## 2019-04-26 PROCEDURE — 74011250637 HC RX REV CODE- 250/637: Performed by: INTERNAL MEDICINE

## 2019-04-26 PROCEDURE — 36415 COLL VENOUS BLD VENIPUNCTURE: CPT

## 2019-04-26 RX ORDER — SODIUM CHLORIDE 0.9 % (FLUSH) 0.9 %
SYRINGE (ML) INJECTION
Status: COMPLETED
Start: 2019-04-26 | End: 2019-04-26

## 2019-04-26 RX ADMIN — DOCUSATE SODIUM 100 MG: 100 CAPSULE, LIQUID FILLED ORAL at 17:31

## 2019-04-26 RX ADMIN — DEXTROSE MONOHYDRATE, SODIUM CHLORIDE, AND POTASSIUM CHLORIDE 100 ML/HR: 50; 4.5; 1.49 INJECTION, SOLUTION INTRAVENOUS at 15:53

## 2019-04-26 RX ADMIN — DIPHENHYDRAMINE HYDROCHLORIDE 25 MG: 25 CAPSULE ORAL at 09:39

## 2019-04-26 RX ADMIN — DIPHENHYDRAMINE HYDROCHLORIDE 25 MG: 25 CAPSULE ORAL at 14:02

## 2019-04-26 RX ADMIN — DIPHENHYDRAMINE HYDROCHLORIDE 25 MG: 25 CAPSULE ORAL at 18:10

## 2019-04-26 RX ADMIN — ONDANSETRON 4 MG: 4 TABLET, ORALLY DISINTEGRATING ORAL at 18:10

## 2019-04-26 RX ADMIN — PANTOPRAZOLE SODIUM 40 MG: 40 TABLET, DELAYED RELEASE ORAL at 08:35

## 2019-04-26 RX ADMIN — HYDROMORPHONE HYDROCHLORIDE 1 MG: 1 INJECTION, SOLUTION INTRAMUSCULAR; INTRAVENOUS; SUBCUTANEOUS at 09:40

## 2019-04-26 RX ADMIN — ONDANSETRON 4 MG: 4 TABLET, ORALLY DISINTEGRATING ORAL at 22:04

## 2019-04-26 RX ADMIN — ENOXAPARIN SODIUM 40 MG: 40 INJECTION SUBCUTANEOUS at 08:35

## 2019-04-26 RX ADMIN — HYDROMORPHONE HYDROCHLORIDE 1 MG: 1 INJECTION, SOLUTION INTRAMUSCULAR; INTRAVENOUS; SUBCUTANEOUS at 22:04

## 2019-04-26 RX ADMIN — Medication 10 ML: at 05:30

## 2019-04-26 RX ADMIN — HYDROMORPHONE HYDROCHLORIDE 1 MG: 1 INJECTION, SOLUTION INTRAMUSCULAR; INTRAVENOUS; SUBCUTANEOUS at 14:02

## 2019-04-26 RX ADMIN — HYDROMORPHONE HYDROCHLORIDE 1 MG: 1 INJECTION, SOLUTION INTRAMUSCULAR; INTRAVENOUS; SUBCUTANEOUS at 01:41

## 2019-04-26 RX ADMIN — DOCUSATE SODIUM 100 MG: 100 CAPSULE, LIQUID FILLED ORAL at 08:34

## 2019-04-26 RX ADMIN — HYDROMORPHONE HYDROCHLORIDE 1 MG: 1 INJECTION, SOLUTION INTRAMUSCULAR; INTRAVENOUS; SUBCUTANEOUS at 18:10

## 2019-04-26 RX ADMIN — FOLIC ACID 1 MG: 1 TABLET ORAL at 08:35

## 2019-04-26 RX ADMIN — POLYETHYLENE GLYCOL 3350 17 G: 17 POWDER, FOR SOLUTION ORAL at 08:35

## 2019-04-26 RX ADMIN — ONDANSETRON 4 MG: 4 TABLET, ORALLY DISINTEGRATING ORAL at 09:39

## 2019-04-26 RX ADMIN — CITALOPRAM HYDROBROMIDE 10 MG: 20 TABLET ORAL at 17:31

## 2019-04-26 RX ADMIN — DIPHENHYDRAMINE HYDROCHLORIDE 25 MG: 25 CAPSULE ORAL at 22:04

## 2019-04-26 RX ADMIN — DIPHENHYDRAMINE HYDROCHLORIDE 25 MG: 25 CAPSULE ORAL at 01:41

## 2019-04-26 RX ADMIN — ONDANSETRON 4 MG: 4 TABLET, ORALLY DISINTEGRATING ORAL at 14:02

## 2019-04-26 RX ADMIN — DIPHENHYDRAMINE HYDROCHLORIDE 25 MG: 25 CAPSULE ORAL at 05:31

## 2019-04-26 RX ADMIN — HYDROMORPHONE HYDROCHLORIDE 1 MG: 1 INJECTION, SOLUTION INTRAMUSCULAR; INTRAVENOUS; SUBCUTANEOUS at 05:31

## 2019-04-26 RX ADMIN — ONDANSETRON 4 MG: 4 TABLET, ORALLY DISINTEGRATING ORAL at 01:41

## 2019-04-26 RX ADMIN — ONDANSETRON 4 MG: 4 TABLET, ORALLY DISINTEGRATING ORAL at 05:31

## 2019-04-26 NOTE — PROGRESS NOTES
Plan for discharge:  Discharge home when medically stable w/family transporting. PCP appointment will be provided. Lucian Denver, [place] 
[phone]

## 2019-04-26 NOTE — PROGRESS NOTES
Oncology End of Shift Note Bedside shift change report given to SAMIR white (incoming nurse) by Stanislaw Moreno (outgoing nurse) on [facility]. Report included the following information SBAR, Kardex, ED Summary, Intake/Output, MAR, Recent Results and Cardiac Rhythm sinus. Shift Summary: no change in patient condition throughout shift. Patient complained of pain periodically throughout shift. PRN pain medication given. Patient is up ad erum. Patient states she had a small formed bowel movement. Issues for Physician to Address:  none Patient on Cardiac Monitoring? [x] Yes 
[] No 
 
Rhythm: Telemetry: normal sinus rhythm Shift Events Stanislaw Moreno

## 2019-04-26 NOTE — PROGRESS NOTES
Problem: Falls - Risk of 
Goal: *Absence of Falls Description Document Dayanna Latin Fall Risk and appropriate interventions in the flowsheet.  
Outcome: Progressing Towards Goal

## 2019-04-26 NOTE — PROGRESS NOTES
Medicare pt has received, reviewed, and signed 2nd IM letter informing them of their right to appeal the discharge. Signed copy has been placed on pt bedside chart. Abram Brush, MSW 
[phone]

## 2019-04-27 PROCEDURE — 74011250636 HC RX REV CODE- 250/636: Performed by: INTERNAL MEDICINE

## 2019-04-27 PROCEDURE — 94760 N-INVAS EAR/PLS OXIMETRY 1: CPT

## 2019-04-27 PROCEDURE — 74011250637 HC RX REV CODE- 250/637: Performed by: INTERNAL MEDICINE

## 2019-04-27 PROCEDURE — 65660000000 HC RM CCU STEPDOWN

## 2019-04-27 RX ADMIN — ENOXAPARIN SODIUM 40 MG: 40 INJECTION SUBCUTANEOUS at 10:18

## 2019-04-27 RX ADMIN — FOLIC ACID 1 MG: 1 TABLET ORAL at 10:18

## 2019-04-27 RX ADMIN — DIPHENHYDRAMINE HYDROCHLORIDE 25 MG: 25 CAPSULE ORAL at 22:35

## 2019-04-27 RX ADMIN — DEXTROSE MONOHYDRATE, SODIUM CHLORIDE, AND POTASSIUM CHLORIDE 100 ML/HR: 50; 4.5; 1.49 INJECTION, SOLUTION INTRAVENOUS at 20:15

## 2019-04-27 RX ADMIN — DEXTROSE MONOHYDRATE, SODIUM CHLORIDE, AND POTASSIUM CHLORIDE 100 ML/HR: 50; 4.5; 1.49 INJECTION, SOLUTION INTRAVENOUS at 01:35

## 2019-04-27 RX ADMIN — DEXTROSE MONOHYDRATE, SODIUM CHLORIDE, AND POTASSIUM CHLORIDE 100 ML/HR: 50; 4.5; 1.49 INJECTION, SOLUTION INTRAVENOUS at 10:24

## 2019-04-27 RX ADMIN — HYDROMORPHONE HYDROCHLORIDE 1 MG: 1 INJECTION, SOLUTION INTRAMUSCULAR; INTRAVENOUS; SUBCUTANEOUS at 06:13

## 2019-04-27 RX ADMIN — POLYETHYLENE GLYCOL 3350 17 G: 17 POWDER, FOR SOLUTION ORAL at 10:18

## 2019-04-27 RX ADMIN — DOCUSATE SODIUM 100 MG: 100 CAPSULE, LIQUID FILLED ORAL at 10:18

## 2019-04-27 RX ADMIN — ONDANSETRON 4 MG: 4 TABLET, ORALLY DISINTEGRATING ORAL at 14:17

## 2019-04-27 RX ADMIN — DIPHENHYDRAMINE HYDROCHLORIDE 25 MG: 25 CAPSULE ORAL at 06:13

## 2019-04-27 RX ADMIN — HYDROMORPHONE HYDROCHLORIDE 1 MG: 1 INJECTION, SOLUTION INTRAMUSCULAR; INTRAVENOUS; SUBCUTANEOUS at 02:00

## 2019-04-27 RX ADMIN — DIPHENHYDRAMINE HYDROCHLORIDE 25 MG: 25 CAPSULE ORAL at 14:17

## 2019-04-27 RX ADMIN — HYDROMORPHONE HYDROCHLORIDE 1 MG: 1 INJECTION, SOLUTION INTRAMUSCULAR; INTRAVENOUS; SUBCUTANEOUS at 18:28

## 2019-04-27 RX ADMIN — CITALOPRAM HYDROBROMIDE 10 MG: 20 TABLET ORAL at 18:29

## 2019-04-27 RX ADMIN — PANTOPRAZOLE SODIUM 40 MG: 40 TABLET, DELAYED RELEASE ORAL at 07:57

## 2019-04-27 RX ADMIN — ONDANSETRON 4 MG: 4 TABLET, ORALLY DISINTEGRATING ORAL at 06:13

## 2019-04-27 RX ADMIN — ONDANSETRON 4 MG: 4 TABLET, ORALLY DISINTEGRATING ORAL at 18:29

## 2019-04-27 RX ADMIN — DOCUSATE SODIUM 100 MG: 100 CAPSULE, LIQUID FILLED ORAL at 18:29

## 2019-04-27 RX ADMIN — HYDROMORPHONE HYDROCHLORIDE 1 MG: 1 INJECTION, SOLUTION INTRAMUSCULAR; INTRAVENOUS; SUBCUTANEOUS at 10:18

## 2019-04-27 RX ADMIN — ONDANSETRON 4 MG: 4 TABLET, ORALLY DISINTEGRATING ORAL at 10:18

## 2019-04-27 RX ADMIN — DIPHENHYDRAMINE HYDROCHLORIDE 25 MG: 25 CAPSULE ORAL at 18:29

## 2019-04-27 RX ADMIN — DIPHENHYDRAMINE HYDROCHLORIDE 25 MG: 25 CAPSULE ORAL at 02:00

## 2019-04-27 RX ADMIN — ONDANSETRON 4 MG: 4 TABLET, ORALLY DISINTEGRATING ORAL at 22:35

## 2019-04-27 RX ADMIN — HYDROMORPHONE HYDROCHLORIDE 1 MG: 1 INJECTION, SOLUTION INTRAMUSCULAR; INTRAVENOUS; SUBCUTANEOUS at 14:17

## 2019-04-27 RX ADMIN — ONDANSETRON 4 MG: 4 TABLET, ORALLY DISINTEGRATING ORAL at 02:00

## 2019-04-27 RX ADMIN — DIPHENHYDRAMINE HYDROCHLORIDE 25 MG: 25 CAPSULE ORAL at 10:19

## 2019-04-27 RX ADMIN — HYDROMORPHONE HYDROCHLORIDE 1 MG: 1 INJECTION, SOLUTION INTRAMUSCULAR; INTRAVENOUS; SUBCUTANEOUS at 22:35

## 2019-04-27 NOTE — PROGRESS NOTES
Oncology End of Shift Note Bedside shift change report given to Ajay Dinero RN (incoming nurse) by Mary Jarrett (outgoing nurse) on [facility]. Report included the following information SBAR, Kardex, Intake/Output and MAR. Shift Summary: Pt remained stable during shift. PRN meds x3. Issues for Physician to Address:   
 
Patient on Cardiac Monitoring? [x] Yes 
[] No 
 
Rhythm:   
 
 
Mary Jarrett

## 2019-04-27 NOTE — PROGRESS NOTES
General Daily Progress Note Admit Date: 4/14/2019 Subjective:  
 
Patient states she is feeling better Current Facility-Administered Medications Medication Dose Route Frequency  lactulose (CHRONULAC) 10 gram/15 mL solution 30 mL  20 g Oral PRN  polyethylene glycol (MIRALAX) packet 17 g  17 g Oral DAILY  dextrose 5% - 0.45% NaCl with KCl 20 mEq/L infusion  100 mL/hr IntraVENous CONTINUOUS  
 fentaNYL (DURAGESIC) 75 mcg/hr patch 1 Patch  1 Patch TransDERmal Q72H  citalopram (CELEXA) tablet 10 mg  10 mg Oral QPM  
 folic acid (FOLVITE) tablet 1 mg  1 mg Oral DAILY  pantoprazole (PROTONIX) tablet 40 mg  40 mg Oral ACB  
 HYDROmorphone (PF) (DILAUDID) injection 1 mg  1 mg IntraVENous Q3H PRN  
 acetaminophen (TYLENOL) tablet 650 mg  650 mg Oral Q4H PRN  
 naloxone (NARCAN) injection 0.4 mg  0.4 mg IntraVENous PRN  
 diphenhydrAMINE (BENADRYL) capsule 25 mg  25 mg Oral Q4H PRN  
 ondansetron (ZOFRAN ODT) tablet 4 mg  4 mg Oral Q4H PRN  
 docusate sodium (COLACE) capsule 100 mg  100 mg Oral BID  enoxaparin (LOVENOX) injection 40 mg  40 mg SubCUTAneous Q24H Review of Systems A comprehensive review of systems was negative. Objective:  
 
Patient Vitals for the past 24 hrs: 
 BP Temp Pulse Resp SpO2  
04/27/19 0810 115/72 98.6 °F (37 °C) 73 16 95 % 04/27/19 0315 130/67 98.8 °F (37.1 °C) 81 16 95 % 04/26/19 2231 137/75 98.7 °F (37.1 °C) 92 16 96 % 04/26/19 2038 133/85 98.1 °F (36.7 °C) 90 16 99 % 04/26/19 1808 136/78  92    
04/26/19 1527 104/44 98.5 °F (36.9 °C) 85 16 93 % 04/26/19 1142 137/73 99 °F (37.2 °C) 83 16 93 % No intake/output data recorded. 04/25 1901 - 04/27 0700 In: [id number] [I.V.:10768] Out: - Physical Exam:  
Visit Vitals /72 (BP 1 Location: Right arm, BP Patient Position: At rest) Pulse 73 Temp 98.6 °F (37 °C) Resp 16 Ht 5' 11\" (1.803 m) Wt 189 lb 13.1 oz (86.1 kg) SpO2 95% BMI 26.47 kg/m² General appearance: alert, cooperative, no distress, appears stated age Neck: supple, symmetrical, trachea midline, no adenopathy, thyroid: not enlarged, symmetric, no tenderness/mass/nodules, no carotid bruit and no JVD Lungs: clear to auscultation bilaterally Heart: regular rate and rhythm, S1, S2 normal, no murmur, click, rub or gallop Abdomen: soft, non-tender. Bowel sounds normal. No masses,  no organomegaly Extremities: extremities normal, atraumatic, no cyanosis or edema Data Review No results found for this or any previous visit (from the past 24 hour(s)). Assessment:  
 
Active Problems: 
  Sickle cell pain crisis ([facility] [address] (9/8/2018) Plan: 1. Conts with pain although improving.

## 2019-04-27 NOTE — PROGRESS NOTES
Oncology End of Shift Note Bedside shift change report given to Yudy Pitt RN (incoming nurse) by Luis Santos (outgoing nurse) on [facility]. Report included the following information SBAR, Kardex and MAR. Shift Summary:  
Patient tolerated care well. Small BM today. Complaint of pain was unchanged. Issues for Physician to Address:   
 
Patient on Cardiac Monitoring? [x] Yes 
[] No 
 
Rhythm: Telemetry: normal sinus rhythm Shift Events Luis Santos

## 2019-04-27 NOTE — PROGRESS NOTES
General Daily Progress Note Admit Date: 4/14/2019 Subjective:  
 
Patient has pain Current Facility-Administered Medications Medication Dose Route Frequency  lactulose (CHRONULAC) 10 gram/15 mL solution 30 mL  20 g Oral PRN  polyethylene glycol (MIRALAX) packet 17 g  17 g Oral DAILY  dextrose 5% - 0.45% NaCl with KCl 20 mEq/L infusion  100 mL/hr IntraVENous CONTINUOUS  
 fentaNYL (DURAGESIC) 75 mcg/hr patch 1 Patch  1 Patch TransDERmal Q72H  citalopram (CELEXA) tablet 10 mg  10 mg Oral QPM  
 folic acid (FOLVITE) tablet 1 mg  1 mg Oral DAILY  pantoprazole (PROTONIX) tablet 40 mg  40 mg Oral ACB  
 HYDROmorphone (PF) (DILAUDID) injection 1 mg  1 mg IntraVENous Q3H PRN  
 acetaminophen (TYLENOL) tablet 650 mg  650 mg Oral Q4H PRN  
 naloxone (NARCAN) injection 0.4 mg  0.4 mg IntraVENous PRN  
 diphenhydrAMINE (BENADRYL) capsule 25 mg  25 mg Oral Q4H PRN  
 ondansetron (ZOFRAN ODT) tablet 4 mg  4 mg Oral Q4H PRN  
 docusate sodium (COLACE) capsule 100 mg  100 mg Oral BID  enoxaparin (LOVENOX) injection 40 mg  40 mg SubCUTAneous Q24H Review of Systems A comprehensive review of systems was negative. Objective:  
 
Patient Vitals for the past 24 hrs: 
 BP Temp Pulse Resp SpO2  
04/26/19 2231 137/75 98.7 °F (37.1 °C) 92 16 96 % 04/26/19 2038 133/85 98.1 °F (36.7 °C) 90 16 99 % 04/26/19 1808 136/78  92    
04/26/19 1527 104/44 98.5 °F (36.9 °C) 85 16 93 % 04/26/19 1142 137/73 99 °F (37.2 °C) 83 16 93 % 04/26/19 0802 124/61 98.8 °F (37.1 °C) 81 16 95 % 04/26/19 0335 124/67 98.4 °F (36.9 °C) 82 16 95 % No intake/output data recorded. No intake/output data recorded. Physical Exam:  
Visit Vitals /75 (BP 1 Location: Right arm, BP Patient Position: At rest) Pulse 92 Temp 98.7 °F (37.1 °C) Resp 16 Ht 5' 11\" (1.803 m) Wt 189 lb 13.1 oz (86.1 kg) SpO2 96% BMI 26.47 kg/m² General appearance: alert, cooperative, no distress, appears stated age Neck: supple, symmetrical, trachea midline, no adenopathy, thyroid: not enlarged, symmetric, no tenderness/mass/nodules, no carotid bruit and no JVD Lungs: clear to auscultation bilaterally Heart: regular rate and rhythm, S1, S2 normal, no murmur, click, rub or gallop Abdomen: soft, non-tender. Bowel sounds normal. No masses,  no organomegaly Extremities: extremities normal, atraumatic, no cyanosis or edema Data Review Recent Results (from the past 24 hour(s)) METABOLIC PANEL, COMPREHENSIVE Collection Time: 04/26/19  2:49 AM  
Result Value Ref Range Sodium 136 136 - 145 mmol/L Potassium 4.1 3.5 - 5.1 mmol/L Chloride 102 97 - 108 mmol/L  
 CO2 32 21 - 32 mmol/L Anion gap 2 (L) 5 - 15 mmol/L Glucose 113 (H) 65 - 100 mg/dL BUN 8 6 - 20 MG/DL Creatinine 0.72 0.55 - 1.02 MG/DL  
 BUN/Creatinine ratio 11 (L) 12 - 20 GFR est AA >60 >60 ml/min/1.73m2 GFR est non-AA >60 >60 ml/min/1.73m2 Calcium 8.4 (L) 8.5 - 10.1 MG/DL Bilirubin, total 1.2 (H) 0.2 - 1.0 MG/DL  
 ALT (SGPT) 20 12 - 78 U/L  
 AST (SGOT) 38 (H) 15 - 37 U/L Alk. phosphatase 116 45 - 117 U/L Protein, total 8.3 (H) 6.4 - 8.2 g/dL Albumin 3.5 3.5 - 5.0 g/dL Globulin 4.8 (H) 2.0 - 4.0 g/dL A-G Ratio 0.7 (L) 1.1 - 2.2 Assessment:  
 
Active Problems: 
  [facility] (9/8[facility]) Plan: 1. Conts with pain although improving.

## 2019-04-28 PROCEDURE — 74011250637 HC RX REV CODE- 250/637: Performed by: INTERNAL MEDICINE

## 2019-04-28 PROCEDURE — 74011250636 HC RX REV CODE- 250/636: Performed by: INTERNAL MEDICINE

## 2019-04-28 PROCEDURE — 65660000000 HC RM CCU STEPDOWN

## 2019-04-28 RX ADMIN — PANTOPRAZOLE SODIUM 40 MG: 40 TABLET, DELAYED RELEASE ORAL at 06:32

## 2019-04-28 RX ADMIN — ONDANSETRON 4 MG: 4 TABLET, ORALLY DISINTEGRATING ORAL at 15:21

## 2019-04-28 RX ADMIN — HYDROMORPHONE HYDROCHLORIDE 1 MG: 1 INJECTION, SOLUTION INTRAMUSCULAR; INTRAVENOUS; SUBCUTANEOUS at 02:36

## 2019-04-28 RX ADMIN — HYDROMORPHONE HYDROCHLORIDE 1 MG: 1 INJECTION, SOLUTION INTRAMUSCULAR; INTRAVENOUS; SUBCUTANEOUS at 15:21

## 2019-04-28 RX ADMIN — ONDANSETRON 4 MG: 4 TABLET, ORALLY DISINTEGRATING ORAL at 06:32

## 2019-04-28 RX ADMIN — DIPHENHYDRAMINE HYDROCHLORIDE 25 MG: 25 CAPSULE ORAL at 15:21

## 2019-04-28 RX ADMIN — ONDANSETRON 4 MG: 4 TABLET, ORALLY DISINTEGRATING ORAL at 20:03

## 2019-04-28 RX ADMIN — DIPHENHYDRAMINE HYDROCHLORIDE 25 MG: 25 CAPSULE ORAL at 06:32

## 2019-04-28 RX ADMIN — ONDANSETRON 4 MG: 4 TABLET, ORALLY DISINTEGRATING ORAL at 02:36

## 2019-04-28 RX ADMIN — HYDROMORPHONE HYDROCHLORIDE 1 MG: 1 INJECTION, SOLUTION INTRAMUSCULAR; INTRAVENOUS; SUBCUTANEOUS at 11:02

## 2019-04-28 RX ADMIN — HYDROMORPHONE HYDROCHLORIDE 1 MG: 1 INJECTION, SOLUTION INTRAMUSCULAR; INTRAVENOUS; SUBCUTANEOUS at 20:03

## 2019-04-28 RX ADMIN — DOCUSATE SODIUM 100 MG: 100 CAPSULE, LIQUID FILLED ORAL at 17:00

## 2019-04-28 RX ADMIN — POLYETHYLENE GLYCOL 3350 17 G: 17 POWDER, FOR SOLUTION ORAL at 08:40

## 2019-04-28 RX ADMIN — DIPHENHYDRAMINE HYDROCHLORIDE 25 MG: 25 CAPSULE ORAL at 20:03

## 2019-04-28 RX ADMIN — HYDROMORPHONE HYDROCHLORIDE 1 MG: 1 INJECTION, SOLUTION INTRAMUSCULAR; INTRAVENOUS; SUBCUTANEOUS at 06:32

## 2019-04-28 RX ADMIN — DEXTROSE MONOHYDRATE, SODIUM CHLORIDE, AND POTASSIUM CHLORIDE 100 ML/HR: 50; 4.5; 1.49 INJECTION, SOLUTION INTRAVENOUS at 06:33

## 2019-04-28 RX ADMIN — ONDANSETRON 4 MG: 4 TABLET, ORALLY DISINTEGRATING ORAL at 11:02

## 2019-04-28 RX ADMIN — DEXTROSE MONOHYDRATE, SODIUM CHLORIDE, AND POTASSIUM CHLORIDE 100 ML/HR: 50; 4.5; 1.49 INJECTION, SOLUTION INTRAVENOUS at 16:56

## 2019-04-28 RX ADMIN — DIPHENHYDRAMINE HYDROCHLORIDE 25 MG: 25 CAPSULE ORAL at 11:02

## 2019-04-28 RX ADMIN — DOCUSATE SODIUM 100 MG: 100 CAPSULE, LIQUID FILLED ORAL at 08:39

## 2019-04-28 RX ADMIN — DIPHENHYDRAMINE HYDROCHLORIDE 25 MG: 25 CAPSULE ORAL at 02:36

## 2019-04-28 RX ADMIN — ENOXAPARIN SODIUM 40 MG: 40 INJECTION SUBCUTANEOUS at 08:39

## 2019-04-28 RX ADMIN — CITALOPRAM HYDROBROMIDE 10 MG: 20 TABLET ORAL at 17:00

## 2019-04-28 RX ADMIN — FOLIC ACID 1 MG: 1 TABLET ORAL at 08:39

## 2019-04-28 NOTE — PROGRESS NOTES
Oncology End of Shift Note Bedside shift change report given to SAMIR Aranda (incoming nurse) by Jorge Downs (outgoing nurse) on [facility]. Report included the following information SBAR, Kardex and MAR. Shift Summary:  
Patient tolerated care well throughout shift. Continuous pulse ox present. Patients complaint of pain was unchanged. Pain treated with meds (see MAR). Patient ambulated in hallway. Patient had a bowel movement today (documented) Issues for Physician to Address:   
 
Patient on Cardiac Monitoring? [x] Yes 
[] No 
 
Rhythm: Telemetry: normal sinus rhythm Shift Events Blase Ora

## 2019-04-28 NOTE — PROGRESS NOTES
Oncology End of Shift Note Bedside shift change report given to Everett Salazar RN (incoming nurse) by Danielito Sarmiento (outgoing nurse) on [facility]. Report included the following information SBAR, Kardex, Intake/Output and MAR. Shift Summary: Pt remained stable during shift. PRN medications x3. No major complaints. Issues for Physician to Address:   
 
Patient on Cardiac Monitoring? [x] Yes 
[] No 
 
Rhythm: Telemetry: normal sinus rhythm Danielito Sarmiento

## 2019-04-28 NOTE — PROGRESS NOTES
General Daily Progress Note Admit Date: 4/14/2019 Subjective:  
 
Patient states she is feeling about the same Current Facility-Administered Medications Medication Dose Route Frequency  lactulose (CHRONULAC) 10 gram/15 mL solution 30 mL  20 g Oral PRN  polyethylene glycol (MIRALAX) packet 17 g  17 g Oral DAILY  dextrose 5% - 0.45% NaCl with KCl 20 mEq/L infusion  100 mL/hr IntraVENous CONTINUOUS  
 fentaNYL (DURAGESIC) 75 mcg/hr patch 1 Patch  1 Patch TransDERmal Q72H  citalopram (CELEXA) tablet 10 mg  10 mg Oral QPM  
 folic acid (FOLVITE) tablet 1 mg  1 mg Oral DAILY  pantoprazole (PROTONIX) tablet 40 mg  40 mg Oral ACB  
 HYDROmorphone (PF) (DILAUDID) injection 1 mg  1 mg IntraVENous Q3H PRN  
 acetaminophen (TYLENOL) tablet 650 mg  650 mg Oral Q4H PRN  
 naloxone (NARCAN) injection 0.4 mg  0.4 mg IntraVENous PRN  
 diphenhydrAMINE (BENADRYL) capsule 25 mg  25 mg Oral Q4H PRN  
 ondansetron (ZOFRAN ODT) tablet 4 mg  4 mg Oral Q4H PRN  
 docusate sodium (COLACE) capsule 100 mg  100 mg Oral BID  enoxaparin (LOVENOX) injection 40 mg  40 mg SubCUTAneous Q24H Review of Systems A comprehensive review of systems was negative. Objective:  
 
Patient Vitals for the past 24 hrs: 
 BP Temp Pulse Resp SpO2  
04/28/19 1052 137/71 99.1 °F (37.3 °C) 86 16 93 % 04/28/19 0801 129/79 98.8 °F (37.1 °C) 81 16 93 % 04/28/19 0253 132/74 99.1 °F (37.3 °C) 91 16 96 % 04/27/19 2320 125/56 98.8 °F (37.1 °C) 91 16 94 % 04/27/19 1849 126/71 98.8 °F (37.1 °C) 86 16 100 % 04/27/19 1611 130/80 98.2 °F (36.8 °C) 79 18 95 % No intake/output data recorded. 04/26 1901 - 04/28 0700 In: 21387.3 [I.V.:79496.3] Out: - Physical Exam:  
Visit Vitals /71 (BP 1 Location: Right arm, BP Patient Position: At rest) Pulse 86 Temp 99.1 °F (37.3 °C) Resp 16 Ht 5' 11\" (1.803 m) Wt 189 lb 13.1 oz (86.1 kg) SpO2 93% BMI 26.47 kg/m² General appearance: alert, cooperative, no distress, appears stated age Neck: supple, symmetrical, trachea midline, no adenopathy, thyroid: not enlarged, symmetric, no tenderness/mass/nodules, no carotid bruit and no JVD Lungs: clear to auscultation bilaterally Heart: regular rate and rhythm, S1, S2 normal, no murmur, click, rub or gallop Abdomen: soft, non-tender. Bowel sounds normal. No masses,  no organomegaly Extremities: extremities normal, atraumatic, no cyanosis or edema Data Review No results found for this or any previous visit (from the past 24 hour(s)). Assessment:  
 
Active Problems: 
  Sickle cell pain crisis ([facility] [address] (9/8/2018) Plan: 1. Conts with pain although improving.

## 2019-04-29 PROCEDURE — 74011250636 HC RX REV CODE- 250/636: Performed by: INTERNAL MEDICINE

## 2019-04-29 PROCEDURE — 74011250637 HC RX REV CODE- 250/637: Performed by: INTERNAL MEDICINE

## 2019-04-29 PROCEDURE — 65660000000 HC RM CCU STEPDOWN

## 2019-04-29 RX ORDER — HYDROMORPHONE HYDROCHLORIDE 1 MG/ML
1 INJECTION, SOLUTION INTRAMUSCULAR; INTRAVENOUS; SUBCUTANEOUS
Status: DISCONTINUED | OUTPATIENT
Start: 2019-04-30 | End: 2019-04-30

## 2019-04-29 RX ADMIN — POLYETHYLENE GLYCOL 3350 17 G: 17 POWDER, FOR SOLUTION ORAL at 08:12

## 2019-04-29 RX ADMIN — ONDANSETRON 4 MG: 4 TABLET, ORALLY DISINTEGRATING ORAL at 17:01

## 2019-04-29 RX ADMIN — DIPHENHYDRAMINE HYDROCHLORIDE 25 MG: 25 CAPSULE ORAL at 08:11

## 2019-04-29 RX ADMIN — ONDANSETRON 4 MG: 4 TABLET, ORALLY DISINTEGRATING ORAL at 21:01

## 2019-04-29 RX ADMIN — PANTOPRAZOLE SODIUM 40 MG: 40 TABLET, DELAYED RELEASE ORAL at 08:11

## 2019-04-29 RX ADMIN — ONDANSETRON 4 MG: 4 TABLET, ORALLY DISINTEGRATING ORAL at 08:17

## 2019-04-29 RX ADMIN — CITALOPRAM HYDROBROMIDE 10 MG: 20 TABLET ORAL at 17:01

## 2019-04-29 RX ADMIN — DEXTROSE MONOHYDRATE, SODIUM CHLORIDE, AND POTASSIUM CHLORIDE 100 ML/HR: 50; 4.5; 1.49 INJECTION, SOLUTION INTRAVENOUS at 13:08

## 2019-04-29 RX ADMIN — DOCUSATE SODIUM 100 MG: 100 CAPSULE, LIQUID FILLED ORAL at 17:01

## 2019-04-29 RX ADMIN — DIPHENHYDRAMINE HYDROCHLORIDE 25 MG: 25 CAPSULE ORAL at 00:08

## 2019-04-29 RX ADMIN — DIPHENHYDRAMINE HYDROCHLORIDE 25 MG: 25 CAPSULE ORAL at 17:01

## 2019-04-29 RX ADMIN — DEXTROSE MONOHYDRATE, SODIUM CHLORIDE, AND POTASSIUM CHLORIDE 100 ML/HR: 50; 4.5; 1.49 INJECTION, SOLUTION INTRAVENOUS at 02:36

## 2019-04-29 RX ADMIN — ENOXAPARIN SODIUM 40 MG: 40 INJECTION SUBCUTANEOUS at 08:11

## 2019-04-29 RX ADMIN — HYDROMORPHONE HYDROCHLORIDE 1 MG: 1 INJECTION, SOLUTION INTRAMUSCULAR; INTRAVENOUS; SUBCUTANEOUS at 00:08

## 2019-04-29 RX ADMIN — ONDANSETRON 4 MG: 4 TABLET, ORALLY DISINTEGRATING ORAL at 04:07

## 2019-04-29 RX ADMIN — HYDROMORPHONE HYDROCHLORIDE 1 MG: 1 INJECTION, SOLUTION INTRAMUSCULAR; INTRAVENOUS; SUBCUTANEOUS at 17:01

## 2019-04-29 RX ADMIN — DIPHENHYDRAMINE HYDROCHLORIDE 25 MG: 25 CAPSULE ORAL at 21:01

## 2019-04-29 RX ADMIN — DIPHENHYDRAMINE HYDROCHLORIDE 25 MG: 25 CAPSULE ORAL at 04:07

## 2019-04-29 RX ADMIN — ONDANSETRON 4 MG: 4 TABLET, ORALLY DISINTEGRATING ORAL at 00:08

## 2019-04-29 RX ADMIN — HYDROMORPHONE HYDROCHLORIDE 1 MG: 1 INJECTION, SOLUTION INTRAMUSCULAR; INTRAVENOUS; SUBCUTANEOUS at 21:01

## 2019-04-29 RX ADMIN — FOLIC ACID 1 MG: 1 TABLET ORAL at 08:11

## 2019-04-29 RX ADMIN — DOCUSATE SODIUM 100 MG: 100 CAPSULE, LIQUID FILLED ORAL at 08:11

## 2019-04-29 RX ADMIN — HYDROMORPHONE HYDROCHLORIDE 1 MG: 1 INJECTION, SOLUTION INTRAMUSCULAR; INTRAVENOUS; SUBCUTANEOUS at 13:02

## 2019-04-29 RX ADMIN — DIPHENHYDRAMINE HYDROCHLORIDE 25 MG: 25 CAPSULE ORAL at 13:02

## 2019-04-29 RX ADMIN — HYDROMORPHONE HYDROCHLORIDE 1 MG: 1 INJECTION, SOLUTION INTRAMUSCULAR; INTRAVENOUS; SUBCUTANEOUS at 08:12

## 2019-04-29 RX ADMIN — HYDROMORPHONE HYDROCHLORIDE 1 MG: 1 INJECTION, SOLUTION INTRAMUSCULAR; INTRAVENOUS; SUBCUTANEOUS at 04:07

## 2019-04-29 RX ADMIN — ONDANSETRON 4 MG: 4 TABLET, ORALLY DISINTEGRATING ORAL at 13:02

## 2019-04-29 NOTE — PROGRESS NOTES
Oncology End of Shift Note Bedside shift change report given to Haseeb Yu RN (incoming nurse) by Zoraida Nieto RN (outgoing nurse) on [facility]. Report included the following information SBAR, Kardex, Intake/Output, MAR and Recent Results. Shift Summary: none Issues for Physician to Address:   
 
Patient on Cardiac Monitoring? [x] Yes 
[] No 
 
Rhythm: Telemetry: normal sinus rhythm Shift Events 
none Zoraida Nieto RN

## 2019-04-29 NOTE — PROGRESS NOTES
Problem: Falls - Risk of 
Goal: *Absence of Falls Description Document Kevin Soria Fall Risk and appropriate interventions in the flowsheet. Outcome: Progressing Towards Goal 
  
Problem: Patient Education: Go to Patient Education Activity Goal: Patient/Family Education Outcome: Progressing Towards Goal 
  
Problem: Pain Goal: *Control of Pain Outcome: Progressing Towards Goal 
Goal: *PALLIATIVE CARE:  Alleviation of Pain Outcome: Progressing Towards Goal 
  
Problem: Patient Education: Go to Patient Education Activity Goal: Patient/Family Education Outcome: Progressing Towards Goal

## 2019-04-29 NOTE — PROGRESS NOTES
Oncology End of Shift Note Bedside shift change report given to SAMIR Mcwilliams (incoming nurse) by Karishma Toledo RN (outgoing nurse) on [facility]. Report included the following information SBAR, Kardex and MAR. Shift Summary: Pain meds x3, slept most of the night Issues for Physician to Address:  None Patient on Cardiac Monitoring? [x] Yes 
[] No 
 
Rhythm: SR Shift Events Karishma Toledo RN

## 2019-04-30 PROCEDURE — 74011250637 HC RX REV CODE- 250/637: Performed by: INTERNAL MEDICINE

## 2019-04-30 PROCEDURE — 94761 N-INVAS EAR/PLS OXIMETRY MLT: CPT

## 2019-04-30 PROCEDURE — 74011250636 HC RX REV CODE- 250/636: Performed by: INTERNAL MEDICINE

## 2019-04-30 PROCEDURE — 94762 N-INVAS EAR/PLS OXIMTRY CONT: CPT

## 2019-04-30 PROCEDURE — 65660000000 HC RM CCU STEPDOWN

## 2019-04-30 RX ORDER — HYDROMORPHONE HYDROCHLORIDE 1 MG/ML
1 INJECTION, SOLUTION INTRAMUSCULAR; INTRAVENOUS; SUBCUTANEOUS
Status: DISCONTINUED | OUTPATIENT
Start: 2019-04-30 | End: 2019-05-01

## 2019-04-30 RX ORDER — HYDROCODONE BITARTRATE AND ACETAMINOPHEN 10; 325 MG/1; MG/1
1 TABLET ORAL
Status: DISCONTINUED | OUTPATIENT
Start: 2019-04-30 | End: 2019-05-02 | Stop reason: HOSPADM

## 2019-04-30 RX ADMIN — DIPHENHYDRAMINE HYDROCHLORIDE 25 MG: 25 CAPSULE ORAL at 05:02

## 2019-04-30 RX ADMIN — DIPHENHYDRAMINE HYDROCHLORIDE 25 MG: 25 CAPSULE ORAL at 14:59

## 2019-04-30 RX ADMIN — DIPHENHYDRAMINE HYDROCHLORIDE 25 MG: 25 CAPSULE ORAL at 01:09

## 2019-04-30 RX ADMIN — HYDROMORPHONE HYDROCHLORIDE 1 MG: 1 INJECTION, SOLUTION INTRAMUSCULAR; INTRAVENOUS; SUBCUTANEOUS at 01:09

## 2019-04-30 RX ADMIN — ENOXAPARIN SODIUM 40 MG: 40 INJECTION SUBCUTANEOUS at 08:55

## 2019-04-30 RX ADMIN — DIPHENHYDRAMINE HYDROCHLORIDE 25 MG: 25 CAPSULE ORAL at 21:05

## 2019-04-30 RX ADMIN — HYDROMORPHONE HYDROCHLORIDE 1 MG: 1 INJECTION, SOLUTION INTRAMUSCULAR; INTRAVENOUS; SUBCUTANEOUS at 14:59

## 2019-04-30 RX ADMIN — POLYETHYLENE GLYCOL 3350 17 G: 17 POWDER, FOR SOLUTION ORAL at 08:55

## 2019-04-30 RX ADMIN — ONDANSETRON 4 MG: 4 TABLET, ORALLY DISINTEGRATING ORAL at 08:54

## 2019-04-30 RX ADMIN — HYDROMORPHONE HYDROCHLORIDE 1 MG: 1 INJECTION, SOLUTION INTRAMUSCULAR; INTRAVENOUS; SUBCUTANEOUS at 05:02

## 2019-04-30 RX ADMIN — ONDANSETRON 4 MG: 4 TABLET, ORALLY DISINTEGRATING ORAL at 01:09

## 2019-04-30 RX ADMIN — DOCUSATE SODIUM 100 MG: 100 CAPSULE, LIQUID FILLED ORAL at 17:56

## 2019-04-30 RX ADMIN — ONDANSETRON 4 MG: 4 TABLET, ORALLY DISINTEGRATING ORAL at 14:59

## 2019-04-30 RX ADMIN — PANTOPRAZOLE SODIUM 40 MG: 40 TABLET, DELAYED RELEASE ORAL at 08:54

## 2019-04-30 RX ADMIN — DIPHENHYDRAMINE HYDROCHLORIDE 25 MG: 25 CAPSULE ORAL at 08:55

## 2019-04-30 RX ADMIN — HYDROMORPHONE HYDROCHLORIDE 1 MG: 1 INJECTION, SOLUTION INTRAMUSCULAR; INTRAVENOUS; SUBCUTANEOUS at 08:55

## 2019-04-30 RX ADMIN — ONDANSETRON 4 MG: 4 TABLET, ORALLY DISINTEGRATING ORAL at 21:05

## 2019-04-30 RX ADMIN — ONDANSETRON 4 MG: 4 TABLET, ORALLY DISINTEGRATING ORAL at 05:02

## 2019-04-30 RX ADMIN — CITALOPRAM HYDROBROMIDE 10 MG: 20 TABLET ORAL at 17:56

## 2019-04-30 RX ADMIN — DOCUSATE SODIUM 100 MG: 100 CAPSULE, LIQUID FILLED ORAL at 08:54

## 2019-04-30 RX ADMIN — FOLIC ACID 1 MG: 1 TABLET ORAL at 08:54

## 2019-04-30 RX ADMIN — HYDROMORPHONE HYDROCHLORIDE 1 MG: 1 INJECTION, SOLUTION INTRAMUSCULAR; INTRAVENOUS; SUBCUTANEOUS at 21:05

## 2019-04-30 NOTE — PROGRESS NOTES
Problem: Pain Goal: *Control of Pain Outcome: Progressing Towards Goal 
 MD adjusting pain meds to begin weaning towards different pain schedule.

## 2019-04-30 NOTE — PROGRESS NOTES
Problem: Falls - Risk of 
Goal: *Absence of Falls Description Document Jordan Cirilo Fall Risk and appropriate interventions in the flowsheet. Outcome: Progressing Towards Goal 
  
Problem: Pain Goal: *Control of Pain Outcome: Progressing Towards Goal 
Goal: *PALLIATIVE CARE:  Alleviation of Pain Outcome: Progressing Towards Goal

## 2019-04-30 NOTE — PROGRESS NOTES
Oncology End of Shift Note Bedside shift change report given to Clarence Pearce RN (incoming nurse) by Brigido Garcia RN (outgoing nurse) on [facility]. Report included the following information SBAR, Kardex and MAR. Shift Summary: Pain meds x4, pain level down to a 5 Issues for Physician to Address:  None Patient on Cardiac Monitoring? [x] Yes 
[] No 
 
Rhythm: SR Shift Events Brigido Garcia RN

## 2019-04-30 NOTE — PROGRESS NOTES
Oncology End of Shift Note Bedside shift change report given to SAMIR Aranda (incoming nurse) by Peggy Garcia RN (outgoing nurse) on [facility]. Report included the following information SBAR. Shift Summary:  
Weaning pt pain meds, pt tolerated well. BMs more regular now. Issues for Physician to Address:   
 
Patient on Cardiac Monitoring? [x] Yes 
[] No 
 
Rhythm: Sinus rhythm Peggy Garcia RN

## 2019-04-30 NOTE — PROGRESS NOTES
General Daily Progress Note Admit Date: 4/14/2019 Subjective:  
 
Patient's pain cont to decrease. Current Facility-Administered Medications Medication Dose Route Frequency  [START ON 4/30/2019] HYDROmorphone (PF) (DILAUDID) injection 1 mg  1 mg IntraVENous Q4H PRN  
 lactulose (CHRONULAC) 10 gram/15 mL solution 30 mL  20 g Oral PRN  polyethylene glycol (MIRALAX) packet 17 g  17 g Oral DAILY  dextrose 5% - 0.45% NaCl with KCl 20 mEq/L infusion  100 mL/hr IntraVENous CONTINUOUS  
 fentaNYL (DURAGESIC) 75 mcg/hr patch 1 Patch  1 Patch TransDERmal Q72H  citalopram (CELEXA) tablet 10 mg  10 mg Oral QPM  
 folic acid (FOLVITE) tablet 1 mg  1 mg Oral DAILY  pantoprazole (PROTONIX) tablet 40 mg  40 mg Oral ACB  acetaminophen (TYLENOL) tablet 650 mg  650 mg Oral Q4H PRN  
 naloxone (NARCAN) injection 0.4 mg  0.4 mg IntraVENous PRN  
 diphenhydrAMINE (BENADRYL) capsule 25 mg  25 mg Oral Q4H PRN  
 ondansetron (ZOFRAN ODT) tablet 4 mg  4 mg Oral Q4H PRN  
 docusate sodium (COLACE) capsule 100 mg  100 mg Oral BID  enoxaparin (LOVENOX) injection 40 mg  40 mg SubCUTAneous Q24H Review of Systems A comprehensive review of systems was negative. Objective:  
 
Patient Vitals for the past 24 hrs: 
 BP Temp Pulse Resp SpO2  
04/29/19 2245 151/73 99.2 °F (37.3 °C) 85 16 95 % 04/29/19 2006 139/79 99.5 °F (37.5 °C) 88 16 95 % 04/29/19 1535 118/56 99 °F (37.2 °C) 81 16 94 % 04/29/19 1112 103/66 99.1 °F (37.3 °C) 82 16 96 % 04/29/19 0751 109/42 98.8 °F (37.1 °C) 78 16 94 % 04/29/19 0351 108/49 98.9 °F (37.2 °C) 75 16 94 % No intake/output data recorded. No intake/output data recorded. Physical Exam:  
Visit Vitals /73 (BP 1 Location: Right arm, BP Patient Position: At rest) Pulse 85 Temp 99.2 °F (37.3 °C) Resp 16 Ht 5' 11\" (1.803 m) Wt 189 lb 13.1 oz (86.1 kg) SpO2 95% BMI 26.47 kg/m² General appearance: alert, cooperative, no distress, appears stated age Neck: supple, symmetrical, trachea midline, no adenopathy, thyroid: not enlarged, symmetric, no tenderness/mass/nodules, no carotid bruit and no JVD Lungs: clear to auscultation bilaterally Heart: regular rate and rhythm, S1, S2 normal, no murmur, click, rub or gallop Abdomen: soft, non-tender. Bowel sounds normal. No masses,  no organomegaly Extremities: extremities normal, atraumatic, no cyanosis or edema Data Review No results found for this or any previous visit (from the past 24 hour(s)). Assessment:  
 
Active Problems: 
  Sickle cell pain crisis ([facility] [address] (9/8/2018) Plan: 1. Will reduce analgesics tomorrow. Sig improvement.

## 2019-05-01 PROCEDURE — 74011250637 HC RX REV CODE- 250/637: Performed by: INTERNAL MEDICINE

## 2019-05-01 PROCEDURE — 74011250636 HC RX REV CODE- 250/636: Performed by: INTERNAL MEDICINE

## 2019-05-01 PROCEDURE — 65660000000 HC RM CCU STEPDOWN

## 2019-05-01 RX ADMIN — HYDROCODONE BITARTRATE AND ACETAMINOPHEN 1 TABLET: 10; 325 TABLET ORAL at 21:50

## 2019-05-01 RX ADMIN — ONDANSETRON 4 MG: 4 TABLET, ORALLY DISINTEGRATING ORAL at 15:35

## 2019-05-01 RX ADMIN — CITALOPRAM HYDROBROMIDE 10 MG: 20 TABLET ORAL at 18:28

## 2019-05-01 RX ADMIN — POLYETHYLENE GLYCOL 3350 17 G: 17 POWDER, FOR SOLUTION ORAL at 08:24

## 2019-05-01 RX ADMIN — FOLIC ACID 1 MG: 1 TABLET ORAL at 08:24

## 2019-05-01 RX ADMIN — DOCUSATE SODIUM 100 MG: 100 CAPSULE, LIQUID FILLED ORAL at 18:28

## 2019-05-01 RX ADMIN — ENOXAPARIN SODIUM 40 MG: 40 INJECTION SUBCUTANEOUS at 08:24

## 2019-05-01 RX ADMIN — HYDROMORPHONE HYDROCHLORIDE 1 MG: 1 INJECTION, SOLUTION INTRAMUSCULAR; INTRAVENOUS; SUBCUTANEOUS at 03:01

## 2019-05-01 RX ADMIN — DOCUSATE SODIUM 100 MG: 100 CAPSULE, LIQUID FILLED ORAL at 08:24

## 2019-05-01 RX ADMIN — HYDROCODONE BITARTRATE AND ACETAMINOPHEN 1 TABLET: 10; 325 TABLET ORAL at 09:28

## 2019-05-01 RX ADMIN — ONDANSETRON 4 MG: 4 TABLET, ORALLY DISINTEGRATING ORAL at 21:50

## 2019-05-01 RX ADMIN — ONDANSETRON 4 MG: 4 TABLET, ORALLY DISINTEGRATING ORAL at 09:28

## 2019-05-01 RX ADMIN — ONDANSETRON 4 MG: 4 TABLET, ORALLY DISINTEGRATING ORAL at 03:01

## 2019-05-01 RX ADMIN — DEXTROSE MONOHYDRATE, SODIUM CHLORIDE, AND POTASSIUM CHLORIDE 50 ML/HR: 50; 4.5; 1.49 INJECTION, SOLUTION INTRAVENOUS at 03:01

## 2019-05-01 RX ADMIN — HYDROCODONE BITARTRATE AND ACETAMINOPHEN 1 TABLET: 10; 325 TABLET ORAL at 15:35

## 2019-05-01 RX ADMIN — DIPHENHYDRAMINE HYDROCHLORIDE 25 MG: 25 CAPSULE ORAL at 03:01

## 2019-05-01 RX ADMIN — DIPHENHYDRAMINE HYDROCHLORIDE 25 MG: 25 CAPSULE ORAL at 09:28

## 2019-05-01 RX ADMIN — PANTOPRAZOLE SODIUM 40 MG: 40 TABLET, DELAYED RELEASE ORAL at 08:24

## 2019-05-01 NOTE — PROGRESS NOTES
General Daily Progress Note Admit Date: 4/14/2019 Subjective:  
 
Patient has no complaint . Current Facility-Administered Medications Medication Dose Route Frequency  HYDROmorphone (PF) (DILAUDID) injection 1 mg  1 mg IntraVENous Q6H PRN  
 HYDROcodone-acetaminophen (NORCO)  mg tablet 1 Tab  1 Tab Oral Q6H PRN  
 lactulose (CHRONULAC) 10 gram/15 mL solution 30 mL  20 g Oral PRN  polyethylene glycol (MIRALAX) packet 17 g  17 g Oral DAILY  dextrose 5% - 0.45% NaCl with KCl 20 mEq/L infusion  50 mL/hr IntraVENous CONTINUOUS  
 fentaNYL (DURAGESIC) 75 mcg/hr patch 1 Patch  1 Patch TransDERmal Q72H  citalopram (CELEXA) tablet 10 mg  10 mg Oral QPM  
 folic acid (FOLVITE) tablet 1 mg  1 mg Oral DAILY  pantoprazole (PROTONIX) tablet 40 mg  40 mg Oral ACB  acetaminophen (TYLENOL) tablet 650 mg  650 mg Oral Q4H PRN  
 naloxone (NARCAN) injection 0.4 mg  0.4 mg IntraVENous PRN  
 diphenhydrAMINE (BENADRYL) capsule 25 mg  25 mg Oral Q4H PRN  
 ondansetron (ZOFRAN ODT) tablet 4 mg  4 mg Oral Q4H PRN  
 docusate sodium (COLACE) capsule 100 mg  100 mg Oral BID  enoxaparin (LOVENOX) injection 40 mg  40 mg SubCUTAneous Q24H Review of Systems A comprehensive review of systems was negative. Objective:  
 
Patient Vitals for the past 24 hrs: 
 BP Temp Pulse Resp SpO2  
04/30/19 1945 139/71 99.2 °F (37.3 °C) 85 18 95 % 04/30/19 1528 131/75 99.2 °F (37.3 °C) 85 18 96 % 04/30/19 1151 148/79 98.6 °F (37 °C) 86  94 % 04/30/19 0743 101/51 99.2 °F (37.3 °C) 80 16 93 % 04/29/19 2245 151/73 99.2 °F (37.3 °C) 85 16 95 % No intake/output data recorded. No intake/output data recorded. Physical Exam:  
Visit Vitals /71 (BP 1 Location: Right arm, BP Patient Position: Sitting) Pulse 85 Temp 99.2 °F (37.3 °C) Resp 18 Ht 5' 11\" (1.803 m) Wt 189 lb 13.1 oz (86.1 kg) SpO2 95% BMI 26.47 kg/m² General appearance: alert, cooperative, no distress, appears stated age Neck: supple, symmetrical, trachea midline, no adenopathy, thyroid: not enlarged, symmetric, no tenderness/mass/nodules, no carotid bruit and no JVD Lungs: clear to auscultation bilaterally Heart: regular rate and rhythm, S1, S2 normal, no murmur, click, rub or gallop Abdomen: soft, non-tender. Bowel sounds normal. No masses,  no organomegaly Extremities: extremities normal, atraumatic, no cyanosis or edema Data Review No results found for this or any previous visit (from the past 24 hour(s)). Assessment:  
 
Active Problems: 
  Sickle cell pain crisis ([facility] [address] (9/8/2018) Plan: 1. Continue treatment of painful crises. Analgesics gradually reduced. Anticipate discharge on Thursday morning.

## 2019-05-01 NOTE — PROGRESS NOTES
General Daily Progress Note Admit Date: 4/14/2019 Subjective:  
 
Patient has no complaint . Current Facility-Administered Medications Medication Dose Route Frequency  HYDROcodone-acetaminophen (NORCO)  mg tablet 1 Tab  1 Tab Oral Q6H PRN  
 lactulose (CHRONULAC) 10 gram/15 mL solution 30 mL  20 g Oral PRN  polyethylene glycol (MIRALAX) packet 17 g  17 g Oral DAILY  dextrose 5% - 0.45% NaCl with KCl 20 mEq/L infusion  25 mL/hr IntraVENous CONTINUOUS  
 fentaNYL (DURAGESIC) 75 mcg/hr patch 1 Patch  1 Patch TransDERmal Q72H  citalopram (CELEXA) tablet 10 mg  10 mg Oral QPM  
 folic acid (FOLVITE) tablet 1 mg  1 mg Oral DAILY  pantoprazole (PROTONIX) tablet 40 mg  40 mg Oral ACB  acetaminophen (TYLENOL) tablet 650 mg  650 mg Oral Q4H PRN  
 naloxone (NARCAN) injection 0.4 mg  0.4 mg IntraVENous PRN  
 diphenhydrAMINE (BENADRYL) capsule 25 mg  25 mg Oral Q4H PRN  
 ondansetron (ZOFRAN ODT) tablet 4 mg  4 mg Oral Q4H PRN  
 docusate sodium (COLACE) capsule 100 mg  100 mg Oral BID  enoxaparin (LOVENOX) injection 40 mg  40 mg SubCUTAneous Q24H Review of Systems A comprehensive review of systems was negative. Objective:  
 
Patient Vitals for the past 24 hrs: 
 BP Temp Pulse Resp SpO2  
05/01/19 0810 117/70 98.9 °F (37.2 °C) 76 16 93 % 05/01/19 0335 109/56 98.7 °F (37.1 °C) 79 16 96 % 04/30/19 2259 136/72 98.7 °F (37.1 °C) 83 16 94 % 04/30/19 1945 139/71 99.2 °F (37.3 °C) 85 18 95 % 04/30/19 1528 131/75 99.2 °F (37.3 °C) 85 18 96 % 04/30/19 1151 148/79 98.6 °F (37 °C) 86  94 % No intake/output data recorded. No intake/output data recorded. Physical Exam:  
Visit Vitals /70 (BP 1 Location: Right arm, BP Patient Position: At rest) Pulse 76 Temp 98.9 °F (37.2 °C) Resp 16 Ht 5' 11\" (1.803 m) Wt 189 lb 13.1 oz (86.1 kg) SpO2 93% BMI 26.47 kg/m² General appearance: alert, cooperative, no distress, appears stated age Neck: supple, symmetrical, trachea midline, no adenopathy, thyroid: not enlarged, symmetric, no tenderness/mass/nodules, no carotid bruit and no JVD Lungs: clear to auscultation bilaterally Heart: regular rate and rhythm, S1, S2 normal, no murmur, click, rub or gallop Abdomen: soft, non-tender. Bowel sounds normal. No masses,  no organomegaly Extremities: extremities normal, atraumatic, no cyanosis or edema Data Review No results found for this or any previous visit (from the past 24 hour(s)). Assessment:  
 
Active Problems: 
  Sickle cell pain crisis ([facility] [address] (9/8/2018) Plan: 1. Plan discharge tomorrow if all goes well. Significant improvement in pain. Will DC parenteral analgesic and observe.

## 2019-05-01 NOTE — PROGRESS NOTES
Oncology End of Shift Note Bedside shift change report given to [facility] SAMIR Maki (incoming nurse) by Art Munson RN (outgoing nurse) on [facility]. Report included the following information SBAR, Kardex and MAR. Shift Summary: Pt requested pain meds twice Issues for Physician to Address:  None Patient on Cardiac Monitoring? [x] Yes 
[] No 
 
Rhythm: SR Shift Events Art Munson RN

## 2019-05-01 NOTE — PROGRESS NOTES
Problem: Falls - Risk of 
Goal: *Absence of Falls Description Document Axel Tran Fall Risk and appropriate interventions in the flowsheet.  
Outcome: Progressing Towards Goal

## 2019-05-02 VITALS
OXYGEN SATURATION: 96 % | HEART RATE: 72 BPM | SYSTOLIC BLOOD PRESSURE: 133 MMHG | BODY MASS INDEX: 26.57 KG/M2 | RESPIRATION RATE: 18 BRPM | DIASTOLIC BLOOD PRESSURE: 74 MMHG | HEIGHT: 71 IN | WEIGHT: 189.82 LBS | TEMPERATURE: 98.6 F

## 2019-05-02 PROCEDURE — 74011250636 HC RX REV CODE- 250/636: Performed by: INTERNAL MEDICINE

## 2019-05-02 PROCEDURE — 74011250637 HC RX REV CODE- 250/637: Performed by: INTERNAL MEDICINE

## 2019-05-02 RX ORDER — FENTANYL 75 UG/H
1 PATCH TRANSDERMAL
Qty: 10 PATCH | Refills: 0 | Status: ON HOLD | OUTPATIENT
Start: 2019-05-02 | End: 2019-06-06

## 2019-05-02 RX ORDER — HYDROCODONE BITARTRATE AND ACETAMINOPHEN 10; 325 MG/1; MG/1
1 TABLET ORAL
Qty: 120 TAB | Refills: 0 | Status: ON HOLD | OUTPATIENT
Start: 2019-05-02 | End: 2019-06-06

## 2019-05-02 RX ADMIN — POLYETHYLENE GLYCOL 3350 17 G: 17 POWDER, FOR SOLUTION ORAL at 08:16

## 2019-05-02 RX ADMIN — PANTOPRAZOLE SODIUM 40 MG: 40 TABLET, DELAYED RELEASE ORAL at 08:17

## 2019-05-02 RX ADMIN — ONDANSETRON 4 MG: 4 TABLET, ORALLY DISINTEGRATING ORAL at 04:48

## 2019-05-02 RX ADMIN — ENOXAPARIN SODIUM 40 MG: 40 INJECTION SUBCUTANEOUS at 08:17

## 2019-05-02 RX ADMIN — HYDROCODONE BITARTRATE AND ACETAMINOPHEN 1 TABLET: 10; 325 TABLET ORAL at 11:08

## 2019-05-02 RX ADMIN — FOLIC ACID 1 MG: 1 TABLET ORAL at 08:17

## 2019-05-02 RX ADMIN — ONDANSETRON 4 MG: 4 TABLET, ORALLY DISINTEGRATING ORAL at 11:08

## 2019-05-02 RX ADMIN — DOCUSATE SODIUM 100 MG: 100 CAPSULE, LIQUID FILLED ORAL at 08:17

## 2019-05-02 RX ADMIN — HYDROCODONE BITARTRATE AND ACETAMINOPHEN 1 TABLET: 10; 325 TABLET ORAL at 04:48

## 2019-05-02 NOTE — PROGRESS NOTES
Oncology End of Shift Note Bedside shift change report given to [facility], RN (incoming nurse) by Laurent Martinez (outgoing nurse) on [facility]. Report included the following information SBAR, Kardex and MAR. Shift Summary: pt stable during shift requested PRN pain meds x2 Issues for Physician to Address:   
 
Patient on Cardiac Monitoring? Yes 
[x] Yes 
[] No 
 
Rhythm: Telemetry: normal sinus rhythm Shift Events Laurent Martinez

## 2019-05-02 NOTE — DISCHARGE INSTRUCTIONS
General Discharge Instructions    Patient ID:  Ever Calero  [id number]  66 y.o.  1960    Patient Instructions        The following personal items were collected during your admission and were returned to you. Take Home Medications           What to do at Home    Recommended diet: Regular Diet    Recommended activity: Activity as tolerated    Follow-up with Angi Mathews MD  in 5 days. Information obtained by :  I understand that if any problems occur once I am at home I am to contact my physician. I understand and acknowledge receipt of the instructions indicated above. Physician's or R.N.'s Signature                                                                  Date/Time                                                                                                                                              Patient or Representative Signature                                                          Date/Time    Follow up with your Hematologist this week. Patient Education        Sickle Cell Crisis: Care Instructions  Your Care Instructions    Sickle cell crisis is a painful episode that may begin suddenly in a person with sickle cell disease. Sickle cell disease turns normal, round red blood cells into cells that look like roopa or crescent moons. The sickle-shaped cells can get stuck in blood vessels, blocking blood flow and causing severe pain. The pain can occur in the bones of the spine, the arms and legs, the chest, and the abdomen. An episode may be called a \"painful event\" or \"painful crisis. \" Some people who have sickle cell disease have many painful events, while others have few or none. Treatment depends on the level of pain and how long it lasts. Sometimes taking nonprescription pain relievers can help.  Or you may need stronger pain relief medicine that is prescribed or given by a doctor. You may need to be treated in the hospital.  It isn't always possible to know what sets off a painful event. But triggers include being dehydrated, cold temperatures, infection, stress, and not getting enough oxygen. Follow-up care is a key part of your treatment and safety. Be sure to make and go to all appointments, and call your doctor if you are having problems. It's also a good idea to know your test results and keep a list of the medicines you take. How can you care for yourself at home? · Create a pain management plan with your doctor. This plan should include the types of medicines you can take and other actions you can take at home to relieve pain. · Drink plenty of fluids, enough so that your urine is light yellow or clear like water. If you have kidney, heart, or liver disease and have to limit fluids, talk with your doctor before you increase the amount of fluids you drink. · Take your medicines exactly as prescribed. Call your doctor if you think you are having a problem with your medicine. · Take pain medicines exactly as directed. ? If the doctor gave you a prescription medicine for pain, take it as prescribed. ? If you are not taking a prescription pain medicine, ask your doctor if you can take an over-the-counter medicine. · Avoid alcohol. It can make you dehydrated. · Dress warmly in cold weather. The cold and windy weather can lead to severe pain. · Do not smoke. Smoking can reduce the amount of oxygen in your blood. · Get plenty of sleep. When should you call for help? Call 911 anytime you think you may need emergency care. For example, call if:    · You have symptoms of a severe problem from sickle cell.     · You have symptoms of a stroke. These may include:  ? Sudden numbness, tingling, weakness, or loss of movement in your face, arm, or leg, especially on only one side of your body. ? Sudden vision changes.   ? Sudden trouble speaking. ? Sudden confusion or trouble understanding simple statements. ? Sudden problems with walking or balance. ? A sudden, severe headache that is different from past headaches.     · You are in severe pain.     · You have symptoms of a heart attack. These may include:  ? Chest pain or pressure, or a strange feeling in the chest.  ? Sweating. ? Shortness of breath. ? Nausea or vomiting. ? Pain, pressure, or a strange feeling in the back, neck, jaw, or upper belly or in one or both shoulders or arms. ? Lightheadedness or sudden weakness. ? A fast or irregular heartbeat. After you call 911, the  may tell you to chew 1 adult-strength or 2 to 4 low-dose aspirin. Wait for an ambulance. Do not try to drive yourself.    Call your doctor now or seek immediate medical care if:    · You have a fever.    Watch closely for changes in your health, and be sure to contact your doctor if you have any problems. Where can you learn more? Go to http://laurie-jung.info/. Enter F104 in the search box to learn more about \"Sickle Cell Crisis: Care Instructions. \"  Current as of: May 6, 2018  Content Version: 11.9  © 0994-4048 [employer]. Care instructions adapted under license by [employer] (which disclaims liability or warranty for this information). If you have questions about a medical condition or this instruction, always ask your healthcare professional. [place] [address] any warranty or liability for your use of this information.

## 2019-05-02 NOTE — PROGRESS NOTES
Medicare pt has received, reviewed, and signed 2nd IM letter informing them of their right to appeal the discharge. Signed copy has been placed on pt bedside chart. Curtis Mahoney [phone]

## 2019-05-03 ENCOUNTER — PATIENT OUTREACH (OUTPATIENT)
Dept: INTERNAL MEDICINE CLINIC | Age: 59
End: 2019-05-03

## 2019-05-03 NOTE — PROGRESS NOTES
[facility] -2019:  Sickle Cell Crisis Hospital Discharge Follow-Up Date/Time:  5/3/2019 3:52 PM 
 
Patient was admitted to [facility] on  and discharged on 2019 for Sickle Cell Crisis . The physician discharge summary was available at the time of outreach. Patient was contacted within 2 business days of discharge. Top Challenges reviewed with the provider Pain Medications PharmD consultation Method of communication with provider :face to face Inpatient RRAT score: 22 Was this a readmission? no  
Patient stated reason for the readmission: n/a Nurse Navigator (NN) contacted the patient by telephone to perform post hospital discharge assessment. Verified name and  with patient as identifiers. Provided introduction to self, and explanation of the Nurse Navigator role. Reviewed discharge instructions and red flags with patient who verbalized understanding. Patient given an opportunity to ask questions and does not have any further questions or concerns at this time. The patient agrees to contact the PCP office for questions related to their healthcare. NN provided contact information for future reference. Disease Specific:   none Summary of patient's top problems: 1. placed on parenteral analgesics and hydration. Analgesic regimen consisted of Dilaudid 1 mg q. 3 hours p.r.n. With this, she was reasonably comfortable. I continued the fentanyl patch of 75 mg also. With this, she was extremely slow as it was always the case to improve. She reached a point where there was improvement to the point where she was comfortable and parenteral analgesics were weaned, discharged back to a baseline level. 
  
She did not require transfusion and there were no complicating features during the hospital stay. 
  
FINAL DIAGNOSES: 
1.  Painful sickle crisis. 2.  Sickle cell hemoglobinopathy. 3.  Depression. 4.  Primary hypertension. 5.  Overweight. Home Health orders at discharge: none [address]: n/a Date of initial visit: n/a Durable Medical Equipment ordered/company: none Durable Medical Equipment received: none Barriers to care? Medication Management Advance Care Planning:  
Does patient have an Advance Directive:  not on file; education provided Healthcare Decision Maker:  Ivon Dears;  Riri Schmitz Medication(s):  
New Medications at Discharge: none Changed Medications at Discharge: none Discontinued Medications at Discharge: none. Medication problems:  PA refuses Fentanyl Patch & Hydrocodone now. Refused release of Fentanyl. Consult w/ Pharmacy; Consult w/ PCP. Medication reconciliation was performed with patient, who verbalizes understanding of administration of home medications. There were no barriers to obtaining medications identified at this time. Referral to Pharm D needed: yes Current Outpatient Medications Medication Sig  
 fentaNYL (DURAGESIC) 75 mcg/hr 1 Patch by TransDERmal route every seventy-two (72) hours for 30 days. Max Daily Amount: 1 Patch.  HYDROcodone-acetaminophen (NORCO)  mg tablet Take 1 Tab by mouth every six (6) hours as needed for Pain for up to 30 days. Max Daily Amount: 4 Tabs. Indications: sickl;e cell crisis  promethazine (PHENERGAN) 25 mg tablet Take 25 mg by mouth every eight (8) hours as needed for Nausea.  ibuprofen (MOTRIN) 200 mg tablet Take 400 mg by mouth every six (6) hours as needed for Pain.  diphenhydrAMINE (BENADRYL) 25 mg tablet Take 50 mg by mouth daily as needed (for itching with hydrocodone).  hydrocortisone (CORTISONE) 1 % topical cream Apply  to affected area two (2) times daily as needed for Skin Irritation. use thin layer  folic acid (FOLVITE) 1 mg tablet Take 1 Tab by mouth daily.   
 citalopram (CELEXA) 10 mg tablet TAKE 1 TABLET BY MOUTH EVERY NIGHT AT BEDTIME  
  losartan-hydroCHLOROthiazide (HYZAAR) 50-12.5 mg per tablet TAKE 1 TABLET BY MOUTH DAILY No current facility-administered medications for this visit. There are no discontinued medications. [facility] follow up appointment(s):  
Future Appointments Date Time Provider Waldo Jernigani 5/9/2019  1:15 PM Chelsy Boyd MD [facility] NELSON TAYLOR [address]  
8/8/2019 10:30 AM Chelsy Boyd MD [facility] follow up appointment(s): none [facility]:  n/a  
 
Goals Addressed This Visit's Progress  Coordinate pain management plan for patient. On track 3/6/2019:  Patient states pain is worst today since d/c due to coldness outside. Patient agreed to warmer clothing, eating less junk foods (sugary products), and drinking an increased water amount. States Fentanyl patch is not working as would like but still uses it along with other pain medications. PCP visit in AM.  EW  
 
5/3/2019:  Patient c/o inability to  fentanyl patch due to needing PA. NN spoke with PharmBERTO Okeefe at [facility]; [facility] system is not allowing Fentanyl Patch pickup w/out PA due to [place]/Morphine in Patch, and patient is also receiving Norco.  States patch placed on yesterday at hospital-5/2/2019 and lasts 3 days, but Connie Jose is approved for pickup on Sunday 5/5. Patient given contact #  should there be a problem over the weekend. EW 
  
  Patient/Family verbalizes understanding of self-management of chronic disease. On track  
  
goal completion:  5/9/2019:  Agreed to attend appointment.

## 2019-05-03 NOTE — DISCHARGE SUMMARY
Darling[address]    Name:  Isaac Prince  MR#:  [id number]  :  1960  ACCOUNT #:  [de-identified]  ADMIT DATE:  2019  DISCHARGE DATE:  2019      HISTORY OF PRESENT ILLNESS:  The patient is a 68-year-old lady, who has SC hemoglobinopathy, presented to the emergency room complaining of generalized pain, which was unresponsive to her p.o. analgesics. She was evaluated and subsequently admitted for painful sickle crisis. No complicating features were evident. Past medical history, social history, review of systems, family history, physical examination are as in the admitting H and P.    LABORATORY VALUES:  Hemoglobin was 9.6, white count 10.2, MCV was 88.8. The platelet count 121,128 with the following differential, 39 segs, 27 lymphocytes, 11 monocytes, 2 eosinophils as well as 1 basophil. At the time of discharge, hemoglobin was 7.9, white count 9.8. Abnormalities on the comprehensive profile limited to a bilirubin of 1.5. Total protein of 9.6. AST of 42. Repeat AST was 38 with a bilirubin of 1.2. HOSPITAL COURSE:  The patient was admitted and placed on parenteral analgesics and hydration. Analgesic regimen consisted of Dilaudid 1 mg q. 3 hours p.r.n. With this, she was reasonably comfortable. I continued the fentanyl patch of 75 mg also. With this, she was extremely slow as it was always the case to improve. She reached a point where there was improvement to the point where she was comfortable and parenteral analgesics were weaned, discharged back to a baseline level. She did not require transfusion and there were no complicating features during the hospital stay. FINAL DIAGNOSES:  1.  Painful sickle crisis. 2.  Sickle cell hemoglobinopathy. 3.  Depression. 4.  Primary hypertension. 5.  Overweight. DISPOSITION:  1.   Start medications with the following:  Ibuprofen 400 mg q. 6 hours p.r.n., losartan/HCTZ 50/12.5 one q.a.m., promethazine 25 mg q. 8 hours p.r.n., folic acid 1 mg daily, Celexa 10 mg daily, Benadryl 25 to 50 mg q. 4 hours p.r.n., fentanyl patch 75 mcg q. 72 hours, hydrocodone 10/325 one tablet q. 6 hours p.r.n.  2.  The patient remains full code and is on a regular diet. 3.  She will return to the office in the next 5 days.         MD ERIK [id number]  D:  05/02/2019 8:59  T:  05/02/2019 11:43  JOB #:  [id number]  CC:

## 2019-05-09 ENCOUNTER — OFFICE VISIT (OUTPATIENT)
Dept: INTERNAL MEDICINE CLINIC | Age: 59
End: 2019-05-09

## 2019-05-09 ENCOUNTER — PATIENT OUTREACH (OUTPATIENT)
Dept: INTERNAL MEDICINE CLINIC | Age: 59
End: 2019-05-09

## 2019-05-09 VITALS
WEIGHT: 193.2 LBS | TEMPERATURE: 48.2 F | OXYGEN SATURATION: 100 % | BODY MASS INDEX: 27.05 KG/M2 | SYSTOLIC BLOOD PRESSURE: 158 MMHG | HEART RATE: 85 BPM | HEIGHT: 71 IN | RESPIRATION RATE: 15 BRPM | DIASTOLIC BLOOD PRESSURE: 88 MMHG

## 2019-05-09 DIAGNOSIS — I10 ESSENTIAL HYPERTENSION: ICD-10-CM

## 2019-05-09 DIAGNOSIS — F32.A DEPRESSION, UNSPECIFIED DEPRESSION TYPE: ICD-10-CM

## 2019-05-09 DIAGNOSIS — D57.1 HB-SS DISEASE WITHOUT CRISIS (HCC): Primary | ICD-10-CM

## 2019-05-09 DIAGNOSIS — I87.2 VENOUS INSUFFICIENCY: ICD-10-CM

## 2019-05-09 RX ORDER — TELMISARTAN 40 MG/1
40 TABLET ORAL DAILY
Qty: 30 TAB | Refills: 11 | Status: SHIPPED | OUTPATIENT
Start: 2019-05-09 | End: 2019-05-26 | Stop reason: DRUGHIGH

## 2019-05-09 RX ORDER — PROMETHAZINE HYDROCHLORIDE 25 MG/1
25 TABLET ORAL
Qty: 90 TAB | Refills: 11 | Status: SHIPPED | OUTPATIENT
Start: 2019-05-09 | End: 2020-05-26 | Stop reason: SDUPTHER

## 2019-05-09 RX ORDER — CITALOPRAM 10 MG/1
TABLET ORAL
Qty: 90 TAB | Refills: 3 | Status: SHIPPED | OUTPATIENT
Start: 2019-05-09 | End: 2020-04-28 | Stop reason: SDUPTHER

## 2019-05-09 NOTE — PROGRESS NOTES
Chief Complaint   Patient presents with    Transitions Of Care       1. Have you been to the ER, urgent care clinic since your last visit? Hospitalized since your last visit? YES 4/14-5/2 [facility]    2. Have you seen or consulted any other health care providers outside of the [address] [place] [address] since your last visit? Include any pap smears or colon screening. No    3) Do you have an Advance Directive on file?  no

## 2019-05-09 NOTE — PROGRESS NOTES
[facility]  [facility]  [address] [place] [id number]  Phone:  [phone]  Fax: [phone]       Chief Complaint   Patient presents with   Anuradha [address]     4/14-5/2   . SUBJECTIVE:    Sheryle Canada is a 66 y.o. female Comes in for return visit, having been hospitalized most recently for painful sickle crises. Her hospital course was uneventful. Since being home, she finds out now that her insurance company will no longer pay for Fentanyl patches and she is relegated exclusively to Hydrocodone. One tablet is not sufficient to control her pain, therefore she has been using two tablets sparingly with each dosing. She also wishes to have another antihypertensive medication in view of the fact that her Losartan was in the adulterated batch. Her depression is reasonably stable and she continues her Celexa. Finally, for her intermittent nausea frequently occurring with her use of narcotic, she will be given Phenergan. Current Outpatient Medications   Medication Sig Dispense Refill    promethazine (PHENERGAN) 25 mg tablet Take 1 Tab by mouth every eight (8) hours as needed for Nausea. 90 Tab 11    citalopram (CELEXA) 10 mg tablet TAKE 1 TABLET BY MOUTH EVERY NIGHT AT BEDTIME 90 Tab 3    telmisartan (MICARDIS) 40 mg tablet Take 1 Tab by mouth daily. 30 Tab 11    fentaNYL (DURAGESIC) 75 mcg/hr 1 Patch by TransDERmal route every seventy-two (72) hours for 30 days. Max Daily Amount: 1 Patch. 10 Patch 0    HYDROcodone-acetaminophen (NORCO)  mg tablet Take 1 Tab by mouth every six (6) hours as needed for Pain for up to 30 days. Max Daily Amount: 4 Tabs. Indications: sickl;e cell crisis 120 Tab 0    ibuprofen (MOTRIN) 200 mg tablet Take 400 mg by mouth every six (6) hours as needed for Pain.  diphenhydrAMINE (BENADRYL) 25 mg tablet Take 50 mg by mouth daily as needed (for itching with hydrocodone).       hydrocortisone (CORTISONE) 1 % topical cream Apply  to affected area two (2) times daily as needed for Skin Irritation. use thin layer      folic acid (FOLVITE) 1 mg tablet Take 1 Tab by mouth daily.  27 Tab 11     Past Medical History:   Diagnosis Date    Anemia     SC hemoglobinopathy    Chronic pain     Depression     Hypertension     Liver disease     hepatitis C; pt reports \"cured\"    Sickle cell disease ([facility] [address]      Past Surgical History:   Procedure Laterality Date    BREAST SURGERY PROCEDURE UNLISTED      2 cysts removed from R breast    CHEST SURGERY PROCEDURE UNLISTED      status post thor for removal of a benign     HX BREAST BIOPSY Right 05/2007    negative    HX CHOLECYSTECTOMY      HX ORTHOPAEDIC      bony curettage of an ostial myelitis focus     Allergies   Allergen Reactions    Dilaudid [Hydromorphone (Bulk)] Itching     Can tolerate with benadryl and ondansetron    Percocet [Oxycodone-Acetaminophen] Itching         REVIEW OF SYSTEMS:  General: negative for - chills or fever  ENT: negative for - headaches, nasal congestion or tinnitus  Respiratory: negative for - cough, hemoptysis, shortness of breath or wheezing  Cardiovascular : negative for - chest pain, edema, palpitations or shortness of breath  Gastrointestinal: negative for - abdominal pain, blood in stools, heartburn or nausea/vomiting  Genito-Urinary: no dysuria, trouble voiding, or hematuria  Musculoskeletal: negative for - gait disturbance, joint pain, joint stiffness or joint swelling  Neurological: no TIA or stroke symptoms  Hematologic: no bruises, no bleeding, no swollen glands  Integument: no lumps, mole changes, nail changes or rash  Endocrine: no malaise/lethargy or unexpected weight changes      Social History     Socioeconomic History    Marital status:      Spouse name: Not on file    Number of children: 2    Years of education: Not on file    Highest education level: Not on file   Occupational History    Occupation: disabled---Sickle cell disease   Tobacco Use    Smoking status: Never Smoker    Smokeless tobacco: Never Used   Substance and Sexual Activity    Alcohol use: No    Drug use: No    Sexual activity: Yes     Partners: Male     Family History   Problem Relation Age of Onset    Hypertension Mother     Hypertension Father        OBJECTIVE:    Visit Vitals  /88 (BP 1 Location: Right arm, BP Patient Position: Sitting)   Pulse 85   Temp (!) 48.2 °F (9 °C) (Oral)   Resp 15   Ht 5' 11\" (1.803 m)   Wt 193 lb 3.2 oz (87.6 kg)   SpO2 100%   BMI 26.95 kg/m²     CONSTITUTIONAL: well , well nourished, appears age appropriate  EYES: perrla, eom intact  ENMT:moist mucous membranes, pharynx clear  NECK: supple. Thyroid normal  RESPIRATORY: Chest: clear to ascultation and percussion   CARDIOVASCULAR: Heart: regular rate and rhythm  GASTROINTESTINAL: Abdomen: soft, bowel sounds active  HEMATOLOGIC: no pathological lymph nodes palpated  MUSCULOSKELETAL: Extremities: no edema, pulse 1+   INTEGUMENT: No unusual rashes or suspicious skin lesions noted. Nails appear normal.  NEUROLOGIC: non-focal exam   MENTAL STATUS: alert and oriented, appropriate affect      ASSESSMENT:  1. Hb-SS disease without crisis ([address]    2. Essential hypertension    3. Venous insufficiency    4. Depression, unspecified depression type        PLAN:    1. I will continue the Hydrocodone and increase the number that she is getting in view of the inability to use Fentanyl patches. She will continue her folic acid 1 mg daily. 2. BP is a bit elevated. I will change her to Telmisartan 40 mg q.d. I will only add the diuretic if she is not normotensive on her return visit. 3. She has occasional swelling of her lower extremities, which is orthostatic in nature, which is related to venous insufficiency. Nothing need be done. 4. Her depression is stable. She will continue Celexa. Prescription is sent in today.   5. I encouraged her to increase her physical activity. Follow-up and Dispositions    · Return in about 2 months (around 7/9/2019).            Amish Van MD

## 2019-05-25 ENCOUNTER — APPOINTMENT (OUTPATIENT)
Dept: GENERAL RADIOLOGY | Age: 59
DRG: 812 | End: 2019-05-25
Attending: EMERGENCY MEDICINE
Payer: MEDICARE

## 2019-05-25 ENCOUNTER — HOSPITAL ENCOUNTER (EMERGENCY)
Age: 59
Discharge: HOME | DRG: 812 | End: 2019-05-25
Attending: EMERGENCY MEDICINE
Payer: MEDICARE

## 2019-05-25 VITALS
WEIGHT: 194 LBS | BODY MASS INDEX: 27.77 KG/M2 | TEMPERATURE: 98.2 F | RESPIRATION RATE: 18 BRPM | HEIGHT: 70 IN | DIASTOLIC BLOOD PRESSURE: 80 MMHG | SYSTOLIC BLOOD PRESSURE: 135 MMHG | OXYGEN SATURATION: 100 % | HEART RATE: 80 BPM

## 2019-05-25 DIAGNOSIS — F11.20 OPIATE DEPENDENCE, CONTINUOUS (HCC): ICD-10-CM

## 2019-05-25 DIAGNOSIS — D57.00 SICKLE CELL PAIN CRISIS (HCC): Primary | ICD-10-CM

## 2019-05-25 LAB
ALBUMIN SERPL-MCNC: 4 G/DL (ref 3.5–5)
ALBUMIN/GLOB SERPL: 0.8 {RATIO} (ref 1.1–2.2)
ALP SERPL-CCNC: 107 U/L (ref 45–117)
ALT SERPL-CCNC: 19 U/L (ref 12–78)
AMPHET UR QL SCN: NEGATIVE
ANION GAP SERPL CALC-SCNC: 8 MMOL/L (ref 5–15)
APPEARANCE UR: CLEAR
AST SERPL-CCNC: 33 U/L (ref 15–37)
BACTERIA URNS QL MICRO: NEGATIVE /HPF
BARBITURATES UR QL SCN: NEGATIVE
BASOPHILS # BLD: 0 K/UL (ref 0–0.1)
BASOPHILS NFR BLD: 0 % (ref 0–1)
BENZODIAZ UR QL: NEGATIVE
BILIRUB SERPL-MCNC: 1.7 MG/DL (ref 0.2–1)
BILIRUB UR QL: NEGATIVE
BUN SERPL-MCNC: 8 MG/DL (ref 6–20)
BUN/CREAT SERPL: 10 (ref 12–20)
CALCIUM SERPL-MCNC: 8.5 MG/DL (ref 8.5–10.1)
CANNABINOIDS UR QL SCN: NEGATIVE
CHLORIDE SERPL-SCNC: 109 MMOL/L (ref 97–108)
CO2 SERPL-SCNC: 24 MMOL/L (ref 21–32)
COCAINE UR QL SCN: NEGATIVE
COLOR UR: NORMAL
CREAT SERPL-MCNC: 0.81 MG/DL (ref 0.55–1.02)
DIFFERENTIAL METHOD BLD: ABNORMAL
DRUG SCRN COMMENT,DRGCM: ABNORMAL
EOSINOPHIL # BLD: 0.2 K/UL (ref 0–0.4)
EOSINOPHIL NFR BLD: 2 % (ref 0–7)
EPITH CASTS URNS QL MICRO: NORMAL /LPF
ERYTHROCYTE [DISTWIDTH] IN BLOOD BY AUTOMATED COUNT: 15.5 % (ref 11.5–14.5)
GLOBULIN SER CALC-MCNC: 4.9 G/DL (ref 2–4)
GLUCOSE SERPL-MCNC: 109 MG/DL (ref 65–100)
GLUCOSE UR STRIP.AUTO-MCNC: NEGATIVE MG/DL
HCT VFR BLD AUTO: 25.4 % (ref 35–47)
HGB BLD-MCNC: 9 G/DL (ref 11.5–16)
HGB UR QL STRIP: NEGATIVE
HYALINE CASTS URNS QL MICRO: NORMAL /LPF (ref 0–5)
IMM GRANULOCYTES # BLD AUTO: 0 K/UL (ref 0–0.04)
IMM GRANULOCYTES NFR BLD AUTO: 0 % (ref 0–0.5)
KETONES UR QL STRIP.AUTO: NEGATIVE MG/DL
LEUKOCYTE ESTERASE UR QL STRIP.AUTO: NEGATIVE
LYMPHOCYTES # BLD: 2.6 K/UL (ref 0.8–3.5)
LYMPHOCYTES NFR BLD: 32 % (ref 12–49)
MCH RBC QN AUTO: 32.1 PG (ref 26–34)
MCHC RBC AUTO-ENTMCNC: 35.4 G/DL (ref 30–36.5)
MCV RBC AUTO: 90.7 FL (ref 80–99)
METHADONE UR QL: NEGATIVE
MONOCYTES # BLD: 0.8 K/UL (ref 0–1)
MONOCYTES NFR BLD: 10 % (ref 5–13)
NEUTS SEG # BLD: 4.5 K/UL (ref 1.8–8)
NEUTS SEG NFR BLD: 56 % (ref 32–75)
NITRITE UR QL STRIP.AUTO: NEGATIVE
NRBC # BLD: 0.02 K/UL (ref 0–0.01)
NRBC BLD-RTO: 0.2 PER 100 WBC
OPIATES UR QL: POSITIVE
PCP UR QL: NEGATIVE
PH UR STRIP: 7 [PH] (ref 5–8)
PLATELET # BLD AUTO: 201 K/UL (ref 150–400)
PMV BLD AUTO: 11.5 FL (ref 8.9–12.9)
POTASSIUM SERPL-SCNC: 3.4 MMOL/L (ref 3.5–5.1)
PROT SERPL-MCNC: 8.9 G/DL (ref 6.4–8.2)
PROT UR STRIP-MCNC: NEGATIVE MG/DL
RBC # BLD AUTO: 2.8 M/UL (ref 3.8–5.2)
RBC #/AREA URNS HPF: NORMAL /HPF (ref 0–5)
RETICS # AUTO: 0.11 M/UL (ref 0.02–0.08)
RETICS/RBC NFR AUTO: 4.1 % (ref 0.7–2.1)
SODIUM SERPL-SCNC: 141 MMOL/L (ref 136–145)
SP GR UR REFRACTOMETRY: 1.01 (ref 1–1.03)
UA: UC IF INDICATED,UAUC: NORMAL
UROBILINOGEN UR QL STRIP.AUTO: 1 EU/DL (ref 0.2–1)
WBC # BLD AUTO: 8.1 K/UL (ref 3.6–11)
WBC URNS QL MICRO: NORMAL /HPF (ref 0–4)

## 2019-05-25 PROCEDURE — 81001 URINALYSIS AUTO W/SCOPE: CPT

## 2019-05-25 PROCEDURE — 74011000258 HC RX REV CODE- 258: Performed by: EMERGENCY MEDICINE

## 2019-05-25 PROCEDURE — 36415 COLL VENOUS BLD VENIPUNCTURE: CPT

## 2019-05-25 PROCEDURE — 96361 HYDRATE IV INFUSION ADD-ON: CPT

## 2019-05-25 PROCEDURE — 74011250636 HC RX REV CODE- 250/636: Performed by: EMERGENCY MEDICINE

## 2019-05-25 PROCEDURE — 96375 TX/PRO/DX INJ NEW DRUG ADDON: CPT

## 2019-05-25 PROCEDURE — 73030 X-RAY EXAM OF SHOULDER: CPT

## 2019-05-25 PROCEDURE — 85025 COMPLETE CBC W/AUTO DIFF WBC: CPT

## 2019-05-25 PROCEDURE — 74011250637 HC RX REV CODE- 250/637: Performed by: EMERGENCY MEDICINE

## 2019-05-25 PROCEDURE — 73521 X-RAY EXAM HIPS BI 2 VIEWS: CPT

## 2019-05-25 PROCEDURE — 80307 DRUG TEST PRSMV CHEM ANLYZR: CPT

## 2019-05-25 PROCEDURE — 85045 AUTOMATED RETICULOCYTE COUNT: CPT

## 2019-05-25 PROCEDURE — 99284 EMERGENCY DEPT VISIT MOD MDM: CPT

## 2019-05-25 PROCEDURE — 80053 COMPREHEN METABOLIC PANEL: CPT

## 2019-05-25 PROCEDURE — 96374 THER/PROPH/DIAG INJ IV PUSH: CPT

## 2019-05-25 RX ORDER — FENTANYL CITRATE 50 UG/ML
50 INJECTION, SOLUTION INTRAMUSCULAR; INTRAVENOUS
Status: COMPLETED | OUTPATIENT
Start: 2019-05-25 | End: 2019-05-25

## 2019-05-25 RX ORDER — FENTANYL 75 UG/H
1 PATCH TRANSDERMAL
Status: DISCONTINUED | OUTPATIENT
Start: 2019-05-25 | End: 2019-05-26 | Stop reason: HOSPADM

## 2019-05-25 RX ORDER — KETOROLAC TROMETHAMINE 30 MG/ML
15 INJECTION, SOLUTION INTRAMUSCULAR; INTRAVENOUS
Status: COMPLETED | OUTPATIENT
Start: 2019-05-25 | End: 2019-05-25

## 2019-05-25 RX ORDER — DIPHENHYDRAMINE HCL 50 MG
50 CAPSULE ORAL
Status: COMPLETED | OUTPATIENT
Start: 2019-05-25 | End: 2019-05-25

## 2019-05-25 RX ADMIN — SODIUM CHLORIDE 1000 ML: 900 INJECTION, SOLUTION INTRAVENOUS at 20:25

## 2019-05-25 RX ADMIN — LIDOCAINE HYDROCHLORIDE 132 MG: 20 INJECTION, SOLUTION EPIDURAL; INFILTRATION; INTRACAUDAL; PERINEURAL at 21:38

## 2019-05-25 RX ADMIN — METHYLPREDNISOLONE SODIUM SUCCINATE 125 MG: 125 INJECTION, POWDER, FOR SOLUTION INTRAMUSCULAR; INTRAVENOUS at 22:22

## 2019-05-25 RX ADMIN — FENTANYL CITRATE 50 MCG: 50 INJECTION, SOLUTION INTRAMUSCULAR; INTRAVENOUS at 22:22

## 2019-05-25 RX ADMIN — SODIUM CHLORIDE 1000 ML: 900 INJECTION, SOLUTION INTRAVENOUS at 19:43

## 2019-05-25 RX ADMIN — KETOROLAC TROMETHAMINE 15 MG: 30 INJECTION, SOLUTION INTRAMUSCULAR at 20:25

## 2019-05-25 RX ADMIN — DIPHENHYDRAMINE HYDROCHLORIDE 50 MG: 50 CAPSULE ORAL at 22:22

## 2019-05-25 NOTE — ED NOTES
Bedside and Verbal shift change report given to Ben (oncoming nurse) by Mele (offgoing nurse). Report included the following information SBAR, ED Summary, MAR and Recent Results.

## 2019-05-26 ENCOUNTER — APPOINTMENT (OUTPATIENT)
Dept: GENERAL RADIOLOGY | Age: 59
DRG: 812 | End: 2019-05-26
Attending: EMERGENCY MEDICINE
Payer: MEDICARE

## 2019-05-26 ENCOUNTER — HOSPITAL ENCOUNTER (INPATIENT)
Age: 59
LOS: 11 days | Discharge: HOME | DRG: 812 | End: 2019-06-06
Attending: EMERGENCY MEDICINE | Admitting: HOSPITALIST
Payer: MEDICARE

## 2019-05-26 DIAGNOSIS — D57.00 SICKLE CELL PAIN CRISIS (HCC): Primary | ICD-10-CM

## 2019-05-26 DIAGNOSIS — D57.00 SICKLE CELL CRISIS (HCC): ICD-10-CM

## 2019-05-26 LAB
ALBUMIN SERPL-MCNC: 4.5 G/DL (ref 3.5–5)
ALBUMIN/GLOB SERPL: 0.8 {RATIO} (ref 1.1–2.2)
ALP SERPL-CCNC: 118 U/L (ref 45–117)
ALT SERPL-CCNC: 20 U/L (ref 12–78)
ANION GAP SERPL CALC-SCNC: 7 MMOL/L (ref 5–15)
AST SERPL-CCNC: 31 U/L (ref 15–37)
BASOPHILS # BLD: 0 K/UL (ref 0–0.1)
BASOPHILS NFR BLD: 0 % (ref 0–1)
BILIRUB SERPL-MCNC: 2.3 MG/DL (ref 0.2–1)
BUN SERPL-MCNC: 8 MG/DL (ref 6–20)
BUN/CREAT SERPL: 10 (ref 12–20)
CALCIUM SERPL-MCNC: 9 MG/DL (ref 8.5–10.1)
CHLORIDE SERPL-SCNC: 110 MMOL/L (ref 97–108)
CO2 SERPL-SCNC: 19 MMOL/L (ref 21–32)
COMMENT, HOLDF: NORMAL
CREAT SERPL-MCNC: 0.83 MG/DL (ref 0.55–1.02)
DIFFERENTIAL METHOD BLD: ABNORMAL
EOSINOPHIL # BLD: 0 K/UL (ref 0–0.4)
EOSINOPHIL NFR BLD: 0 % (ref 0–7)
ERYTHROCYTE [DISTWIDTH] IN BLOOD BY AUTOMATED COUNT: 15.9 % (ref 11.5–14.5)
GLOBULIN SER CALC-MCNC: 5.4 G/DL (ref 2–4)
GLUCOSE SERPL-MCNC: 160 MG/DL (ref 65–100)
HCT VFR BLD AUTO: 30.6 % (ref 35–47)
HGB BLD-MCNC: 10.5 G/DL (ref 11.5–16)
IMM GRANULOCYTES # BLD AUTO: 0 K/UL (ref 0–0.04)
IMM GRANULOCYTES NFR BLD AUTO: 0 % (ref 0–0.5)
INR PPP: 1.3 (ref 0.9–1.1)
LACTATE SERPL-SCNC: 0.8 MMOL/L (ref 0.4–2)
LYMPHOCYTES # BLD: 0.8 K/UL (ref 0.8–3.5)
LYMPHOCYTES NFR BLD: 15 % (ref 12–49)
MCH RBC QN AUTO: 31.8 PG (ref 26–34)
MCHC RBC AUTO-ENTMCNC: 34.3 G/DL (ref 30–36.5)
MCV RBC AUTO: 92.7 FL (ref 80–99)
MONOCYTES # BLD: 0.1 K/UL (ref 0–1)
MONOCYTES NFR BLD: 2 % (ref 5–13)
NEUTS SEG # BLD: 4.5 K/UL (ref 1.8–8)
NEUTS SEG NFR BLD: 83 % (ref 32–75)
NRBC # BLD: 0.03 K/UL (ref 0–0.01)
NRBC BLD-RTO: 0.6 PER 100 WBC
PLATELET # BLD AUTO: 156 K/UL (ref 150–400)
PMV BLD AUTO: 12.9 FL (ref 8.9–12.9)
POTASSIUM SERPL-SCNC: 3.8 MMOL/L (ref 3.5–5.1)
PROT SERPL-MCNC: 9.9 G/DL (ref 6.4–8.2)
PROTHROMBIN TIME: 13.6 SEC (ref 9–11.1)
RBC # BLD AUTO: 3.3 M/UL (ref 3.8–5.2)
RBC MORPH BLD: ABNORMAL
RETICS # AUTO: 0.16 M/UL (ref 0.02–0.08)
RETICS/RBC NFR AUTO: 4.9 % (ref 0.7–2.1)
SAMPLES BEING HELD,HOLD: NORMAL
SODIUM SERPL-SCNC: 136 MMOL/L (ref 136–145)
WBC # BLD AUTO: 5.4 K/UL (ref 3.6–11)

## 2019-05-26 PROCEDURE — 80053 COMPREHEN METABOLIC PANEL: CPT

## 2019-05-26 PROCEDURE — 96374 THER/PROPH/DIAG INJ IV PUSH: CPT

## 2019-05-26 PROCEDURE — 96375 TX/PRO/DX INJ NEW DRUG ADDON: CPT

## 2019-05-26 PROCEDURE — 99283 EMERGENCY DEPT VISIT LOW MDM: CPT

## 2019-05-26 PROCEDURE — 74011250636 HC RX REV CODE- 250/636: Performed by: HOSPITALIST

## 2019-05-26 PROCEDURE — 74011250636 HC RX REV CODE- 250/636: Performed by: EMERGENCY MEDICINE

## 2019-05-26 PROCEDURE — 71045 X-RAY EXAM CHEST 1 VIEW: CPT

## 2019-05-26 PROCEDURE — 85045 AUTOMATED RETICULOCYTE COUNT: CPT

## 2019-05-26 PROCEDURE — 85025 COMPLETE CBC W/AUTO DIFF WBC: CPT

## 2019-05-26 PROCEDURE — 83605 ASSAY OF LACTIC ACID: CPT

## 2019-05-26 PROCEDURE — 65660000000 HC RM CCU STEPDOWN

## 2019-05-26 PROCEDURE — 74011250637 HC RX REV CODE- 250/637: Performed by: EMERGENCY MEDICINE

## 2019-05-26 PROCEDURE — 74011250637 HC RX REV CODE- 250/637: Performed by: HOSPITALIST

## 2019-05-26 PROCEDURE — 36415 COLL VENOUS BLD VENIPUNCTURE: CPT

## 2019-05-26 PROCEDURE — 85610 PROTHROMBIN TIME: CPT

## 2019-05-26 RX ORDER — HYDROCODONE BITARTRATE AND ACETAMINOPHEN 10; 325 MG/1; MG/1
1 TABLET ORAL
Status: DISCONTINUED | OUTPATIENT
Start: 2019-05-26 | End: 2019-05-27

## 2019-05-26 RX ORDER — SODIUM CHLORIDE 0.9 % (FLUSH) 0.9 %
5-40 SYRINGE (ML) INJECTION EVERY 8 HOURS
Status: DISCONTINUED | OUTPATIENT
Start: 2019-05-26 | End: 2019-06-06 | Stop reason: HOSPADM

## 2019-05-26 RX ORDER — OMEPRAZOLE 20 MG/1
20 TABLET, DELAYED RELEASE ORAL
COMMUNITY

## 2019-05-26 RX ORDER — HYDROMORPHONE HYDROCHLORIDE 1 MG/ML
1.5 INJECTION, SOLUTION INTRAMUSCULAR; INTRAVENOUS; SUBCUTANEOUS ONCE
Status: COMPLETED | OUTPATIENT
Start: 2019-05-26 | End: 2019-05-26

## 2019-05-26 RX ORDER — HYDRALAZINE HYDROCHLORIDE 50 MG/1
25 TABLET, FILM COATED ORAL
Status: COMPLETED | OUTPATIENT
Start: 2019-05-26 | End: 2019-05-26

## 2019-05-26 RX ORDER — DIPHENHYDRAMINE HYDROCHLORIDE 50 MG/ML
25 INJECTION, SOLUTION INTRAMUSCULAR; INTRAVENOUS
Status: COMPLETED | OUTPATIENT
Start: 2019-05-26 | End: 2019-05-26

## 2019-05-26 RX ORDER — ACETAMINOPHEN 325 MG/1
650 TABLET ORAL
Status: DISCONTINUED | OUTPATIENT
Start: 2019-05-26 | End: 2019-06-06 | Stop reason: HOSPADM

## 2019-05-26 RX ORDER — TELMISARTAN 40 MG/1
40 TABLET ORAL
COMMUNITY
End: 2019-07-01 | Stop reason: SDUPTHER

## 2019-05-26 RX ORDER — CITALOPRAM 20 MG/1
10 TABLET, FILM COATED ORAL
Status: DISCONTINUED | OUTPATIENT
Start: 2019-05-26 | End: 2019-06-06 | Stop reason: HOSPADM

## 2019-05-26 RX ORDER — SODIUM CHLORIDE 0.9 % (FLUSH) 0.9 %
5-40 SYRINGE (ML) INJECTION AS NEEDED
Status: DISCONTINUED | OUTPATIENT
Start: 2019-05-26 | End: 2019-06-06 | Stop reason: HOSPADM

## 2019-05-26 RX ORDER — PANTOPRAZOLE SODIUM 40 MG/1
40 TABLET, DELAYED RELEASE ORAL
Status: DISCONTINUED | OUTPATIENT
Start: 2019-05-26 | End: 2019-06-06 | Stop reason: HOSPADM

## 2019-05-26 RX ORDER — LOSARTAN POTASSIUM 50 MG/1
50 TABLET ORAL
Status: DISCONTINUED | OUTPATIENT
Start: 2019-05-26 | End: 2019-06-06 | Stop reason: HOSPADM

## 2019-05-26 RX ORDER — HYDROMORPHONE HYDROCHLORIDE 1 MG/ML
1 INJECTION, SOLUTION INTRAMUSCULAR; INTRAVENOUS; SUBCUTANEOUS ONCE
Status: COMPLETED | OUTPATIENT
Start: 2019-05-26 | End: 2019-05-26

## 2019-05-26 RX ORDER — DIPHENHYDRAMINE HCL 25 MG
25 CAPSULE ORAL
Status: DISCONTINUED | OUTPATIENT
Start: 2019-05-26 | End: 2019-05-27

## 2019-05-26 RX ORDER — IBUPROFEN 400 MG/1
400 TABLET ORAL
Status: DISCONTINUED | OUTPATIENT
Start: 2019-05-26 | End: 2019-05-28

## 2019-05-26 RX ORDER — HYDROMORPHONE HYDROCHLORIDE 1 MG/ML
1 INJECTION, SOLUTION INTRAMUSCULAR; INTRAVENOUS; SUBCUTANEOUS
Status: DISCONTINUED | OUTPATIENT
Start: 2019-05-26 | End: 2019-06-04

## 2019-05-26 RX ORDER — DEXTROSE, SODIUM CHLORIDE, AND POTASSIUM CHLORIDE 5; .45; .075 G/100ML; G/100ML; G/100ML
100 INJECTION INTRAVENOUS CONTINUOUS
Status: DISCONTINUED | OUTPATIENT
Start: 2019-05-26 | End: 2019-05-31 | Stop reason: CLARIF

## 2019-05-26 RX ORDER — ONDANSETRON 2 MG/ML
4 INJECTION INTRAMUSCULAR; INTRAVENOUS
Status: COMPLETED | OUTPATIENT
Start: 2019-05-26 | End: 2019-05-26

## 2019-05-26 RX ORDER — DOCUSATE SODIUM 100 MG/1
100 CAPSULE, LIQUID FILLED ORAL 2 TIMES DAILY
Status: DISCONTINUED | OUTPATIENT
Start: 2019-05-26 | End: 2019-06-06 | Stop reason: HOSPADM

## 2019-05-26 RX ORDER — ENOXAPARIN SODIUM 100 MG/ML
40 INJECTION SUBCUTANEOUS EVERY 24 HOURS
Status: DISCONTINUED | OUTPATIENT
Start: 2019-05-26 | End: 2019-06-06 | Stop reason: HOSPADM

## 2019-05-26 RX ORDER — FOLIC ACID 1 MG/1
1 TABLET ORAL
COMMUNITY
End: 2020-07-04

## 2019-05-26 RX ORDER — ONDANSETRON 2 MG/ML
4 INJECTION INTRAMUSCULAR; INTRAVENOUS
Status: DISCONTINUED | OUTPATIENT
Start: 2019-05-26 | End: 2019-06-06 | Stop reason: HOSPADM

## 2019-05-26 RX ORDER — FOLIC ACID 1 MG/1
1 TABLET ORAL
Status: DISCONTINUED | OUTPATIENT
Start: 2019-05-26 | End: 2019-06-06 | Stop reason: HOSPADM

## 2019-05-26 RX ADMIN — FOLIC ACID 1 MG: 1 TABLET ORAL at 21:32

## 2019-05-26 RX ADMIN — HYDRALAZINE HYDROCHLORIDE 25 MG: 50 TABLET, FILM COATED ORAL at 11:06

## 2019-05-26 RX ADMIN — HYDROMORPHONE HYDROCHLORIDE 1 MG: 1 INJECTION, SOLUTION INTRAMUSCULAR; INTRAVENOUS; SUBCUTANEOUS at 17:52

## 2019-05-26 RX ADMIN — DIPHENHYDRAMINE HYDROCHLORIDE 25 MG: 25 CAPSULE ORAL at 23:03

## 2019-05-26 RX ADMIN — LOSARTAN POTASSIUM 50 MG: 50 TABLET ORAL at 21:32

## 2019-05-26 RX ADMIN — DEXTROSE MONOHYDRATE, SODIUM CHLORIDE, AND POTASSIUM CHLORIDE 150 ML/HR: 50; 4.5; .745 INJECTION, SOLUTION INTRAVENOUS at 18:26

## 2019-05-26 RX ADMIN — DIPHENHYDRAMINE HYDROCHLORIDE 25 MG: 50 INJECTION, SOLUTION INTRAMUSCULAR; INTRAVENOUS at 09:27

## 2019-05-26 RX ADMIN — DIPHENHYDRAMINE HYDROCHLORIDE 25 MG: 25 CAPSULE ORAL at 17:36

## 2019-05-26 RX ADMIN — ENOXAPARIN SODIUM 40 MG: 40 INJECTION SUBCUTANEOUS at 21:33

## 2019-05-26 RX ADMIN — ONDANSETRON 4 MG: 2 INJECTION INTRAMUSCULAR; INTRAVENOUS at 09:27

## 2019-05-26 RX ADMIN — DOCUSATE SODIUM 100 MG: 100 CAPSULE, LIQUID FILLED ORAL at 17:36

## 2019-05-26 RX ADMIN — Medication 10 ML: at 17:38

## 2019-05-26 RX ADMIN — HYDRALAZINE HYDROCHLORIDE 25 MG: 50 TABLET ORAL at 11:12

## 2019-05-26 RX ADMIN — PANTOPRAZOLE SODIUM 40 MG: 40 TABLET, DELAYED RELEASE ORAL at 14:23

## 2019-05-26 RX ADMIN — ONDANSETRON 4 MG: 2 INJECTION INTRAMUSCULAR; INTRAVENOUS at 23:03

## 2019-05-26 RX ADMIN — ONDANSETRON 4 MG: 2 INJECTION INTRAMUSCULAR; INTRAVENOUS at 17:36

## 2019-05-26 RX ADMIN — Medication 10 ML: at 23:04

## 2019-05-26 RX ADMIN — HYDROMORPHONE HYDROCHLORIDE 1.5 MG: 1 INJECTION, SOLUTION INTRAMUSCULAR; INTRAVENOUS; SUBCUTANEOUS at 11:30

## 2019-05-26 RX ADMIN — HYDROCODONE BITARTRATE AND ACETAMINOPHEN 1 TABLET: 10; 325 TABLET ORAL at 23:03

## 2019-05-26 RX ADMIN — HYDROMORPHONE HYDROCHLORIDE 1 MG: 1 INJECTION, SOLUTION INTRAMUSCULAR; INTRAVENOUS; SUBCUTANEOUS at 14:23

## 2019-05-26 RX ADMIN — SODIUM CHLORIDE 1000 ML: 900 INJECTION, SOLUTION INTRAVENOUS at 09:27

## 2019-05-26 RX ADMIN — CITALOPRAM 10 MG: 20 TABLET ORAL at 21:32

## 2019-05-26 RX ADMIN — DEXTROSE MONOHYDRATE, SODIUM CHLORIDE, AND POTASSIUM CHLORIDE 125 ML/HR: 50; 4.5; .745 INJECTION, SOLUTION INTRAVENOUS at 11:25

## 2019-05-26 RX ADMIN — HYDROMORPHONE HYDROCHLORIDE 1 MG: 1 INJECTION, SOLUTION INTRAMUSCULAR; INTRAVENOUS; SUBCUTANEOUS at 21:27

## 2019-05-26 RX ADMIN — HYDROMORPHONE HYDROCHLORIDE 1 MG: 1 INJECTION, SOLUTION INTRAMUSCULAR; INTRAVENOUS; SUBCUTANEOUS at 09:27

## 2019-05-26 NOTE — DISCHARGE INSTRUCTIONS
Patient Education        Sickle Cell Crisis: Care Instructions  Your Care Instructions    Sickle cell crisis is a painful episode that may begin suddenly in a person with sickle cell disease. Sickle cell disease turns normal, round red blood cells into cells that look like roopa or crescent moons. The sickle-shaped cells can get stuck in blood vessels, blocking blood flow and causing severe pain. The pain can occur in the bones of the spine, the arms and legs, the chest, and the abdomen. An episode may be called a \"painful event\" or \"painful crisis. \" Some people who have sickle cell disease have many painful events, while others have few or none. Treatment depends on the level of pain and how long it lasts. Sometimes taking nonprescription pain relievers can help. Or you may need stronger pain relief medicine that is prescribed or given by a doctor. You may need to be treated in the hospital.  It isn't always possible to know what sets off a painful event. But triggers include being dehydrated, cold temperatures, infection, stress, and not getting enough oxygen. Follow-up care is a key part of your treatment and safety. Be sure to make and go to all appointments, and call your doctor if you are having problems. It's also a good idea to know your test results and keep a list of the medicines you take. How can you care for yourself at home? · Create a pain management plan with your doctor. This plan should include the types of medicines you can take and other actions you can take at home to relieve pain. · Drink plenty of fluids, enough so that your urine is light yellow or clear like water. If you have kidney, heart, or liver disease and have to limit fluids, talk with your doctor before you increase the amount of fluids you drink. · Take your medicines exactly as prescribed. Call your doctor if you think you are having a problem with your medicine. · Take pain medicines exactly as directed.   ? If the doctor gave you a prescription medicine for pain, take it as prescribed. ? If you are not taking a prescription pain medicine, ask your doctor if you can take an over-the-counter medicine. · Avoid alcohol. It can make you dehydrated. · Dress warmly in cold weather. The cold and windy weather can lead to severe pain. · Do not smoke. Smoking can reduce the amount of oxygen in your blood. · Get plenty of sleep. When should you call for help? Call 911 anytime you think you may need emergency care. For example, call if:    · You have symptoms of a severe problem from sickle cell.     · You have symptoms of a stroke. These may include:  ? Sudden numbness, tingling, weakness, or loss of movement in your face, arm, or leg, especially on only one side of your body. ? Sudden vision changes. ? Sudden trouble speaking. ? Sudden confusion or trouble understanding simple statements. ? Sudden problems with walking or balance. ? A sudden, severe headache that is different from past headaches.     · You are in severe pain.     · You have symptoms of a heart attack. These may include:  ? Chest pain or pressure, or a strange feeling in the chest.  ? Sweating. ? Shortness of breath. ? Nausea or vomiting. ? Pain, pressure, or a strange feeling in the back, neck, jaw, or upper belly or in one or both shoulders or arms. ? Lightheadedness or sudden weakness. ? A fast or irregular heartbeat. After you call 911, the  may tell you to chew 1 adult-strength or 2 to 4 low-dose aspirin. Wait for an ambulance. Do not try to drive yourself.    Call your doctor now or seek immediate medical care if:    · You have a fever.    Watch closely for changes in your health, and be sure to contact your doctor if you have any problems. Where can you learn more? Go to http://laurie-jung.info/. Enter F104 in the search box to learn more about \"Sickle Cell Crisis: Care Instructions. \"  Current as of:  May 6, 2018  Content Version: 11.9  © 9378-3521 [employer]. Care instructions adapted under license by [employer] (which disclaims liability or warranty for this information). If you have questions about a medical condition or this instruction, always ask your healthcare professional. [address] any warranty or liability for your use of this information. Patient Education        Sickle Cell Disease: Care Instructions  Your Care Instructions    Sickle cell disease turns normal, round red blood cells into misshaped cells that look like roopa or crescent moons. The sickle-shaped cells can get stuck in blood vessels, blocking blood flow and causing severe pain. The sickle-shaped cells also can harm organs, muscles, and bones. It is a lifelong condition. Sickle cell disease is passed down in families. You can talk to your doctor about whether to have genetic tests to find out the chance of having a child with the disease. Your doctor also may recommend that your family members get tested for sickle cell disease. Your doctor may treat you with medicines. Some people get blood transfusions or a bone marrow transplant. Managing pain is an important part of your treatment. Follow-up care is a key part of your treatment and safety. Be sure to make and go to all appointments, and call your doctor if you are having problems. It's also a good idea to know your test results and keep a list of the medicines you take. How can you care for yourself at home? · Take your medicines exactly as prescribed. Call your doctor if you think you are having a problem with your medicine. · Take pain medicines exactly as directed. ? If the doctor gave you a prescription medicine for pain, take it as prescribed. ? If you are not taking a prescription pain medicine, ask your doctor if you can take an over-the-counter medicine. · Try to help ease pain by distracting yourself.  Use guided imagery, deep breathing, and relaxation exercises. A pain specialist can teach you pain management skills. · Avoid alcohol. It can make you dehydrated. · Dress warmly in cold weather. The cold and windy weather can lead to severe pain. · Do not smoke. Smoking can reduce the amount of oxygen in your blood. If you need help quitting, talk to your doctor about stop-smoking programs and medicines. These can increase your chances of quitting for good. · Get plenty of sleep. · Get regular eye exams. Sickle cell disease can cause vision problems. · Wear medical alert jewelry that says that you have sickle cell disease. You can buy this at most [employer]. · Avoid colds and flu. Get a pneumococcal vaccine shot. If you have had one before, ask your doctor whether you need another dose. Get a flu shot every year. If you must be around people with colds or flu, wash your hands often. When should you call for help? Call 911 anytime you think you may need emergency care. For example, call if:    · You have symptoms of a severe problem from sickle cell.     · You have symptoms of a stroke. These may include:  ? Sudden numbness, tingling, weakness, or loss of movement in your face, arm, or leg, especially on only one side of your body. ? Sudden vision changes. ? Sudden trouble speaking. ? Sudden confusion or trouble understanding simple statements. ? Sudden problems with walking or balance. ? A sudden, severe headache that is different from past headaches.     · You are in severe pain.     · You have symptoms of a heart attack. These may include:  ? Chest pain or pressure, or a strange feeling in the chest.  ? Sweating. ? Shortness of breath. ? Nausea or vomiting. ? Pain, pressure, or a strange feeling in the back, neck, jaw, or upper belly or in one or both shoulders or arms. ? Lightheadedness or sudden weakness. ? A fast or irregular heartbeat.   After you call 911, the  may tell you to chew 1 adult-strength or 2 to 4 low-dose aspirin. Wait for an ambulance. Do not try to drive yourself.    Call your doctor now or seek immediate medical care if:    · You have a fever.    Watch closely for changes in your health, and be sure to contact your doctor if you have any problems. Where can you learn more? Go to http://laurie-jung.info/. Enter 486 8150 in the search box to learn more about \"Sickle Cell Disease: Care Instructions. \"  Current as of: May 6, 2018  Content Version: 11.9  © 6245-9100 [employer]. Care instructions adapted under license by [employer] (which disclaims liability or warranty for this information). If you have questions about a medical condition or this instruction, always ask your healthcare professional. [address] any warranty or liability for your use of this information.

## 2019-05-26 NOTE — ED NOTES
Patient reports that she needs dilaudid and has called pcp to call ER to talk to ER physician. Dr. Jessica Busby aware. Pt chart states allergy to dilaudid.

## 2019-05-26 NOTE — ED PROVIDER NOTES
EMERGENCY DEPARTMENT HISTORY AND PHYSICAL EXAM      Date: 5/26/2019  Patient Name: Jose David Scott    Please note that this dictation was completed with [employer], the computer voice recognition software. Quite often unanticipated grammatical, syntax, homophones, and other interpretive errors are inadvertently transcribed by the computer software. Please disregard these errors. Please excuse any errors that have escaped final proofreading. History of Presenting Illness     Chief Complaint   Patient presents with    Sickle Cell Crisis     per pt she was here yesterday for a sickle cell crisis and is still in pain        History Provided By: Patient    HPI: Jose David Scott, 66 y.o. female, with past medical history significant for sickle cell disease with several recent admissions for acute pain crisis followed by Dr. Elder Schmidt presented the emergency department complaining of bilateral arm pain and bilateral hip pain. This is the typical area where she has her pain. She was seen here yesterday for the same complaint. She is using fentanyl patches and had fentanyl IV, but this was not helping. Her home medicines are not helping. She called Dr. Elder Schmidt who directed her here to the emergency department with the plan for hospitalization. Patient denies any fever, chills, chest pain, shortness of breath, nausea vomiting or diarrhea. PCP: Rosana Quinones MD    No current facility-administered medications on file prior to encounter. Current Outpatient Medications on File Prior to Encounter   Medication Sig Dispense Refill    folic acid (FOLVITE) 1 mg tablet Take 1 mg by mouth nightly.  telmisartan (MICARDIS) 40 mg tablet Take 40 mg by mouth nightly.  omeprazole (PRILOSEC OTC) 20 mg tablet Take 20 mg by mouth daily.  promethazine (PHENERGAN) 25 mg tablet Take 1 Tab by mouth every eight (8) hours as needed for Nausea.  90 Tab 11    citalopram (CELEXA) 10 mg tablet TAKE 1 TABLET BY MOUTH EVERY NIGHT AT BEDTIME 90 Tab 3    HYDROcodone-acetaminophen (NORCO)  mg tablet Take 1 Tab by mouth every six (6) hours as needed for Pain for up to 30 days. Max Daily Amount: 4 Tabs. Indications: sickl;e cell crisis 120 Tab 0    ibuprofen (MOTRIN) 200 mg tablet Take 400 mg by mouth every six (6) hours as needed for Pain.  diphenhydrAMINE (BENADRYL) 25 mg tablet Take 50 mg by mouth daily as needed (for itching with hydrocodone).  hydrocortisone (CORTISONE) 1 % topical cream Apply  to affected area two (2) times daily as needed for Skin Irritation. use thin layer      fentaNYL (DURAGESIC) 75 mcg/hr 1 Patch by TransDERmal route every seventy-two (72) hours for 30 days. Max Daily Amount: 1 Patch. 10 Patch 0       Past History     Past Medical History:  Past Medical History:   Diagnosis Date    Anemia     SC hemoglobinopathy    Chronic pain     Depression     Hypertension     Liver disease     hepatitis C; pt reports \"cured\"    Sickle cell disease ([facility] [address]        Past Surgical History:  Past Surgical History:   Procedure Laterality Date    BREAST SURGERY PROCEDURE UNLISTED      2 cysts removed from R breast    CHEST SURGERY PROCEDURE UNLISTED      status post thor for removal of a benign     HX BREAST BIOPSY Right 05/2007    negative    HX CHOLECYSTECTOMY      HX ORTHOPAEDIC      bony curettage of an ostial myelitis focus       Family History:  Family History   Problem Relation Age of Onset    Hypertension Mother     Hypertension Father        Social History:  Social History     Tobacco Use    Smoking status: Never Smoker    Smokeless tobacco: Never Used   Substance Use Topics    Alcohol use: No    Drug use: No       Allergies: Allergies   Allergen Reactions    Dilaudid [Hydromorphone (Bulk)] Itching     Can tolerate with benadryl and ondansetron    Percocet [Oxycodone-Acetaminophen] Itching         Review of Systems   Review of Systems   Constitutional: Negative for chills and fever. HENT: Negative for congestion and sore throat. Eyes: Negative for visual disturbance. Respiratory: Negative for cough and shortness of breath. Cardiovascular: Negative for chest pain and leg swelling. Gastrointestinal: Negative for abdominal pain, blood in stool, diarrhea and nausea. Endocrine: Negative for polyuria. Genitourinary: Negative for dysuria, flank pain, vaginal bleeding and vaginal discharge. Musculoskeletal: Positive for arthralgias and myalgias. Skin: Negative for rash. Allergic/Immunologic: Negative for immunocompromised state. Neurological: Negative for weakness and headaches. Psychiatric/Behavioral: Negative for confusion. Physical Exam   Physical Exam   Constitutional: oriented to person, place, and time. appears well-developed and well-nourished. HENT:   Head: Normocephalic and atraumatic. Moist mucous membranes   Eyes: Pupils are equal, round, and reactive to light. Conjunctivae are normal. Right eye exhibits no discharge. Left eye exhibits no discharge. Neck: Normal range of motion. Neck supple. No tracheal deviation present. Cardiovascular: Tachycardic rate, regular rhythm and normal heart sounds. No murmur heard. Pulmonary/Chest: Effort normal and breath sounds normal. No respiratory distress. no wheezes. no rales. Abdominal: Soft. Bowel sounds are normal. There is no tenderness. There is no rebound and no guarding. Musculoskeletal: Normal range of motion. exhibits no edema, tenderness or deformity. Neurological: alert and oriented to person, place, and time. Skin: Skin is warm and dry. No rash noted. No erythema. Psychiatric: behavior is normal.   Nursing note and vitals reviewed.     Diagnostic Study Results     Labs -     Recent Results (from the past 12 hour(s))   METABOLIC PANEL, BASIC    Collection Time: 05/27/19  3:42 AM   Result Value Ref Range    Sodium 137 136 - 145 mmol/L    Potassium 3.9 3.5 - 5.1 mmol/L    Chloride 109 (H) 97 - 108 mmol/L    CO2 22 21 - 32 mmol/L    Anion gap 6 5 - 15 mmol/L    Glucose 121 (H) 65 - 100 mg/dL    BUN 9 6 - 20 MG/DL    Creatinine 0.75 0.55 - 1.02 MG/DL    BUN/Creatinine ratio 12 12 - 20      GFR est AA >60 >60 ml/min/1.73m2    GFR est non-AA >60 >60 ml/min/1.73m2    Calcium 7.8 (L) 8.5 - 10.1 MG/DL   CBC W/O DIFF    Collection Time: 05/27/19  3:42 AM   Result Value Ref Range    WBC 14.3 (H) 3.6 - 11.0 K/uL    RBC 2.39 (L) 3.80 - 5.20 M/uL    HGB 7.7 (L) 11.5 - 16.0 g/dL    HCT 22.2 (L) 35.0 - 47.0 %    MCV 92.9 80.0 - 99.0 FL    MCH 32.2 26.0 - 34.0 PG    MCHC 34.7 30.0 - 36.5 g/dL    RDW 15.7 (H) 11.5 - 14.5 %    PLATELET 342 (L) 229 - 400 K/uL    MPV 11.4 8.9 - 12.9 FL    NRBC 0.4 (H) 0  WBC    ABSOLUTE NRBC 0.06 (H) 0.00 - 0.01 K/uL       Radiologic Studies -   XR CHEST PORT   Final Result   Minimal atelectasis or scar in the lung bases and right midlung field. . No   confluent infiltrate at this time. .           CT Results  (Last 48 hours)    None        CXR Results  (Last 48 hours)               05/26/19 1003  XR CHEST PORT Final result    Impression:  Minimal atelectasis or scar in the lung bases and right midlung field. . No   confluent infiltrate at this time. .           Narrative:  INDICATION:  screening. Sickle cell disease, suspect crisis. EXAM: Chest single view. COMPARISON: 2/16/2019. FINDINGS: A single frontal view of the chest at 09 50 hours shows minimal   bibasilar and right midlung field atelectasis or scar, roughly stable. .  The   heart, mediastinum and pulmonary vasculature are stable . The bony thorax is   unremarkable for age. .                   Medical Decision Making   I am the first provider for this patient. I reviewed the vital signs, available nursing notes, past medical history, past surgical history, family history and social history. Vital Signs-Reviewed the patient's vital signs.   Patient Vitals for the past 12 hrs:   Temp Pulse Resp BP SpO2 05/27/19 0849 98.5 °F (36.9 °C) 84 18 146/78 96 %   05/27/19 0324 98.4 °F (36.9 °C) 87 18 147/81 99 %   05/26/19 2341 98.4 °F (36.9 °C) 80 16 145/80 98 %       Pulse Oximetry Analysis -100% on room air      Records Reviewed: Nursing Notes and Old Medical Records    Provider Notes (Medical Decision Making):   Patient here with repeat visit for sickle cell pain crisis, will need hospitalization likely for pain control. Disposition to the hospitalist anticipated. Will provide IV analgesia and fluids. ED Course:   Initial assessment performed. The patients presenting problems have been discussed, and they are in agreement with the care plan formulated and outlined with them. I have encouraged them to ask questions as they arise throughout their visit. Critical Care Time:   none    Disposition:  She will be admitted to the hospital by Dr. Gurinder Valdivia, and care will be transitioned to Dr. Estephanie Simon. Diagnosis     Clinical Impression:   1. Sickle cell pain crisis [facility])        Attestations:   This note was completed by Yves Etienne DO

## 2019-05-26 NOTE — PROGRESS NOTES
General Surgery End of Shift Nursing Note    Bedside shift change report given to [id number] (oncoming nurse) by Wale Boyd RN (offgoing nurse). Report included the following information SBAR. Shift worked:   7am-7pm   Summary of shift:    patiient arrived to unit from ER    Left and right arm pain    Left and right hip pain           Issues for physician to address:        Number times ambulated in hallway past shift: 0    Number of times OOB to chair past shift: 0    Pain Management:  Current medication: dilaudid  Patient states pain is manageable on current pain medication: YES    GI:    Current diet:  DIET REGULAR 3-4 GM (RENUKA)    Tolerating current diet: YES  Passing flatus: NO  Last Bowel Movement: several days ago   Appearance:     Respiratory:    Incentive Spirometer at bedside:   Patient instructed on use:     Patient Safety:    Falls Score: 1  Bed Alarm On? Not applicable  Sitter?  Not applicable    Wale Boyd

## 2019-05-26 NOTE — PROGRESS NOTES
Pharmacy Clarification of Prior to Admission Medication Regimen     The patient was interviewed regarding clarification of the prior to admission medication regimen and was questioned regarding use of any other inhalers, topical products, over the counter medications, herbal medications, vitamin products or ophthalmic/nasal/otic medication use. Information Obtained From: Patient, RX Query    Pertinent Pharmacy Findings:  fentaNYL ([address]) 75 mcg/hr: Patient stated she has been 'out' of this agent 'for about a month' due to 'insurance issues'    PTA medication list was corrected to the following:     Prior to Admission Medications   Prescriptions Last Dose Informant Patient Reported? Taking? HYDROcodone-acetaminophen (NORCO)  mg tablet 2019 at Unknown time Self No Yes   Sig: Take 1 Tab by mouth every six (6) hours as needed for Pain for up to 30 days. Max Daily Amount: 4 Tabs. Indications: sickl;e cell crisis   citalopram (CELEXA) 10 mg tablet 2019 at Unknown time Self No Yes   Sig: TAKE 1 TABLET BY MOUTH EVERY NIGHT AT BEDTIME   diphenhydrAMINE (BENADRYL) 25 mg tablet 2019 at Unknown time Self Yes Yes   Sig: Take 50 mg by mouth daily as needed (for itching with hydrocodone). fentaNYL (DURAGESIC) 75 mcg/hr Not Taking at Unknown time Self No No   Si Patch by TransDERmal route every seventy-two (72) hours for 30 days. Max Daily Amount: 1 Patch. folic acid (FOLVITE) 1 mg tablet 2019 at Unknown time Self Yes Yes   Sig: Take 1 mg by mouth nightly. hydrocortisone (CORTISONE) 1 % topical cream 2019 at Unknown time Self Yes Yes   Sig: Apply  to affected area two (2) times daily as needed for Skin Irritation. use thin layer   ibuprofen (MOTRIN) 200 mg tablet 2019 at Unknown time Self Yes Yes   Sig: Take 400 mg by mouth every six (6) hours as needed for Pain.    promethazine (PHENERGAN) 25 mg tablet 2019 at Unknown time Self No Yes   Sig: Take 1 Tab by mouth every eight (8) hours as needed for Nausea. telmisartan (MICARDIS) 40 mg tablet 5/23/2019 at Unknown time Self Yes Yes   Sig: Take 40 mg by mouth nightly.       Facility-Administered Medications: None          Thank you,  Linda Renee CPhT  Medication History Pharmacy Technician

## 2019-05-26 NOTE — ED PROVIDER NOTES
EMERGENCY DEPARTMENT HISTORY AND PHYSICAL EXAM           Date: 5/25/2019  Patient Name: Dominique Bae    History of Presenting Illness     Chief Complaint   Patient presents with    Sickle Cell Crisis     pt states \"I'm having sickle cell pain\"    Medication Problem     per pt she only has one Hydrocodone left and has been unable to fill her Fentanyl patch filled       History Provided By: Patient    HPI: Dominique Bae is a 66 y.o. female, with significant pmhx of sickle cell disease, HTN, who presents ambulatory to the ED with c/o \"i'm having sickle cell pain\" and running out of pain meds. Patient reports that her symptoms began yesterday with left-sided notes associated tender to palpation. Full range of motion but increased pain when doing so. Patient also has bilateral hip/groin pain with radiation towards her lower back. Patient notes that these are typical symptoms of her previous cell pain crises. Patient reports having taken hydrocodone and over-the-counter ibuprofen at home without much relief. Patient informed triage that she has not been able to refill her fentanyl patch. Patient followed by Dr. Mayo Candelaria for her sickle cell pain crisis and does not note following with hematology. Pt specifically denies any recent fevers, chills, nausea, vomiting, diarrhea, abd pain, CP, SOB, urinary sxs, changes in BM, or headache. PCP: Eliu Box MD    Social Hx: denies tobacco, denies EtOH, denies Illicit Drugs     There are no other complaints, changes, or physical findings at this time. Allergies   Allergen Reactions    Dilaudid [Hydromorphone (Bulk)] Itching     Can tolerate with benadryl and ondansetron    Percocet [Oxycodone-Acetaminophen] Itching         Current Outpatient Medications   Medication Sig Dispense Refill    promethazine (PHENERGAN) 25 mg tablet Take 1 Tab by mouth every eight (8) hours as needed for Nausea.  90 Tab 11    citalopram (CELEXA) 10 mg tablet TAKE 1 TABLET BY MOUTH EVERY NIGHT AT BEDTIME 90 Tab 3    telmisartan (MICARDIS) 40 mg tablet Take 1 Tab by mouth daily. 30 Tab 11    fentaNYL (DURAGESIC) 75 mcg/hr 1 Patch by TransDERmal route every seventy-two (72) hours for 30 days. Max Daily Amount: 1 Patch. 10 Patch 0    HYDROcodone-acetaminophen (NORCO)  mg tablet Take 1 Tab by mouth every six (6) hours as needed for Pain for up to 30 days. Max Daily Amount: 4 Tabs. Indications: sickl;e cell crisis 120 Tab 0    ibuprofen (MOTRIN) 200 mg tablet Take 400 mg by mouth every six (6) hours as needed for Pain.  diphenhydrAMINE (BENADRYL) 25 mg tablet Take 50 mg by mouth daily as needed (for itching with hydrocodone).  hydrocortisone (CORTISONE) 1 % topical cream Apply  to affected area two (2) times daily as needed for Skin Irritation. use thin layer      folic acid (FOLVITE) 1 mg tablet Take 1 Tab by mouth daily. 27 Tab 11       Past History     Past Medical History:  Past Medical History:   Diagnosis Date    Anemia     SC hemoglobinopathy    Chronic pain     Depression     Hypertension     Liver disease     hepatitis C; pt reports \"cured\"    Sickle cell disease ([facility] [address]        Past Surgical History:  Past Surgical History:   Procedure Laterality Date    BREAST SURGERY PROCEDURE UNLISTED      2 cysts removed from R breast    CHEST SURGERY PROCEDURE UNLISTED      status post thor for removal of a benign     HX BREAST BIOPSY Right 05/2007    negative    HX CHOLECYSTECTOMY      HX ORTHOPAEDIC      bony curettage of an ostial myelitis focus       Family History:  Family History   Problem Relation Age of Onset    Hypertension Mother     Hypertension Father        Social History:  Social History     Tobacco Use    Smoking status: Never Smoker    Smokeless tobacco: Never Used   Substance Use Topics    Alcohol use: No    Drug use: No       Allergies:   Allergies   Allergen Reactions    Dilaudid [Hydromorphone (Bulk)] Itching     Can tolerate with benadryl and ondansetron    Percocet [Oxycodone-Acetaminophen] Itching         Review of Systems   Review of Systems   Constitutional: Negative. Negative for fever. Eyes: Negative. Respiratory: Negative. Negative for shortness of breath. Cardiovascular: Negative for chest pain. Gastrointestinal: Negative for abdominal pain, nausea and vomiting. Endocrine: Negative. Genitourinary: Negative. Negative for difficulty urinating, dysuria and hematuria. Musculoskeletal: Positive for arthralgias, back pain and myalgias. Skin: Negative. Neurological: Negative. Psychiatric/Behavioral: Negative for suicidal ideas. All other systems reviewed and are negative. Physical Exam   Physical Exam   Constitutional: She is oriented to person, place, and time. She appears well-developed and well-nourished. She appears distressed (Patient sitting upright in bed rocking back and forth.). HENT:   Head: Normocephalic and atraumatic. Nose: Nose normal.   Mouth/Throat: Mucous membranes are dry. Eyes: Conjunctivae and EOM are normal. No scleral icterus. Neck: Normal range of motion. No tracheal deviation present. Cardiovascular: Regular rhythm, normal heart sounds and intact distal pulses. Tachycardia present. Exam reveals no friction rub. No murmur heard. Pulmonary/Chest: Effort normal and breath sounds normal. No stridor. Tachypnea noted. No respiratory distress. She has no wheezes. She has no rales. Abdominal: Soft. Bowel sounds are normal. She exhibits no distension. There is no tenderness. There is no rebound. Musculoskeletal: Normal range of motion. Left shoulder: She exhibits tenderness and pain. Right hip: She exhibits normal range of motion, normal strength, no tenderness, no bony tenderness, no crepitus and no deformity. Left hip: She exhibits normal range of motion, normal strength, no tenderness, no bony tenderness and no swelling.         Lumbar back: She exhibits pain. She exhibits no tenderness and no swelling. Back:         Legs:  Neurological: She is alert and oriented to person, place, and time. No cranial nerve deficit. Skin: Skin is warm and dry. No rash noted. She is not diaphoretic. Psychiatric: She has a normal mood and affect. Her speech is normal and behavior is normal. Judgment and thought content normal. Cognition and memory are normal.   Nursing note and vitals reviewed. Diagnostic Study Results     Labs -     Recent Results (from the past 12 hour(s))   CBC WITH AUTOMATED DIFF    Collection Time: 05/25/19  7:33 PM   Result Value Ref Range    WBC 8.1 3.6 - 11.0 K/uL    RBC 2.80 (L) 3.80 - 5.20 M/uL    HGB 9.0 (L) 11.5 - 16.0 g/dL    HCT 25.4 (L) 35.0 - 47.0 %    MCV 90.7 80.0 - 99.0 FL    MCH 32.1 26.0 - 34.0 PG    MCHC 35.4 30.0 - 36.5 g/dL    RDW 15.5 (H) 11.5 - 14.5 %    PLATELET 592 879 - 582 K/uL    MPV 11.5 8.9 - 12.9 FL    NRBC 0.2 (H) 0  WBC    ABSOLUTE NRBC 0.02 (H) 0.00 - 0.01 K/uL    NEUTROPHILS 56 32 - 75 %    LYMPHOCYTES 32 12 - 49 %    MONOCYTES 10 5 - 13 %    EOSINOPHILS 2 0 - 7 %    BASOPHILS 0 0 - 1 %    IMMATURE GRANULOCYTES 0 0.0 - 0.5 %    ABS. NEUTROPHILS 4.5 1.8 - 8.0 K/UL    ABS. LYMPHOCYTES 2.6 0.8 - 3.5 K/UL    ABS. MONOCYTES 0.8 0.0 - 1.0 K/UL    ABS. EOSINOPHILS 0.2 0.0 - 0.4 K/UL    ABS. BASOPHILS 0.0 0.0 - 0.1 K/UL    ABS. IMM.  GRANS. 0.0 0.00 - 0.04 K/UL    DF AUTOMATED     METABOLIC PANEL, COMPREHENSIVE    Collection Time: 05/25/19  7:33 PM   Result Value Ref Range    Sodium 141 136 - 145 mmol/L    Potassium 3.4 (L) 3.5 - 5.1 mmol/L    Chloride 109 (H) 97 - 108 mmol/L    CO2 24 21 - 32 mmol/L    Anion gap 8 5 - 15 mmol/L    Glucose 109 (H) 65 - 100 mg/dL    BUN 8 6 - 20 MG/DL    Creatinine 0.81 0.55 - 1.02 MG/DL    BUN/Creatinine ratio 10 (L) 12 - 20      GFR est AA >60 >60 ml/min/1.73m2    GFR est non-AA >60 >60 ml/min/1.73m2    Calcium 8.5 8.5 - 10.1 MG/DL    Bilirubin, total 1.7 (H) 0.2 - 1.0 MG/DL    ALT (SGPT) 19 12 - 78 U/L    AST (SGOT) 33 15 - 37 U/L    Alk. phosphatase 107 45 - 117 U/L    Protein, total 8.9 (H) 6.4 - 8.2 g/dL    Albumin 4.0 3.5 - 5.0 g/dL    Globulin 4.9 (H) 2.0 - 4.0 g/dL    A-G Ratio 0.8 (L) 1.1 - 2.2     RETICULOCYTE COUNT    Collection Time: 05/25/19  7:33 PM   Result Value Ref Range    Reticulocyte count 4.1 (H) 0.7 - 2.1 %    Absolute Retic Cnt. 0.1145 (H) 0.0164 - 0.0776 M/ul   URINALYSIS W/ REFLEX CULTURE    Collection Time: 05/25/19  7:43 PM   Result Value Ref Range    Color YELLOW/STRAW      Appearance CLEAR CLEAR      Specific gravity 1.008 1.003 - 1.030      pH (UA) 7.0 5.0 - 8.0      Protein NEGATIVE  NEG mg/dL    Glucose NEGATIVE  NEG mg/dL    Ketone NEGATIVE  NEG mg/dL    Bilirubin NEGATIVE  NEG      Blood NEGATIVE  NEG      Urobilinogen 1.0 0.2 - 1.0 EU/dL    Nitrites NEGATIVE  NEG      Leukocyte Esterase NEGATIVE  NEG      WBC 0-4 0 - 4 /hpf    RBC 0-5 0 - 5 /hpf    Epithelial cells FEW FEW /lpf    Bacteria NEGATIVE  NEG /hpf    UA:UC IF INDICATED CULTURE NOT INDICATED BY UA RESULT CNI      Hyaline cast 0-2 0 - 5 /lpf   DRUG SCREEN, URINE    Collection Time: 05/25/19  7:43 PM   Result Value Ref Range    AMPHETAMINES NEGATIVE  NEG      BARBITURATES NEGATIVE  NEG      BENZODIAZEPINES NEGATIVE  NEG      COCAINE NEGATIVE  NEG      METHADONE NEGATIVE  NEG      OPIATES POSITIVE (A) NEG      PCP(PHENCYCLIDINE) NEGATIVE  NEG      THC (TH-CANNABINOL) NEGATIVE  NEG      Drug screen comment (NOTE)        Radiologic Studies -   XR HIPS BI W AP PELV   Final Result   No acute abnormality      XR SHOULDER LT AP/LAT MIN 2 V   Final Result   No acute abnormality. CT Results  (Last 48 hours)    None        CXR Results  (Last 48 hours)    None            Medical Decision Making   I am the first provider for this patient. I reviewed the vital signs, available nursing notes, past medical history, past surgical history, family history and social history.     Vital Signs-Reviewed the patient's vital signs. Patient Vitals for the past 12 hrs:   Temp Pulse Resp BP SpO2   05/25/19 2214  80 18 135/80 100 %   05/25/19 2212 98.2 °F (36.8 °C) 86 18 135/80    05/25/19 2200  82          Pulse Oximetry Analysis - 99% on RA    Records Reviewed: Nursing Notes, Old Medical Records, Previous Radiology Studies and Previous Laboratory Studies    Provider Notes (Medical Decision Making):     DDX:  Sickle cell pain crisis, electrolyte abnormality, dehydration, UTI    Plan:  Labs, reticulocyte count, analgesic, IV fluids, x-rays    Impression:  Sickle cell pain crisis, opiate dependence    ED Course:   Initial assessment performed. The patients presenting problems have been discussed, and they are in agreement with the care plan formulated and outlined with them. I have encouraged them to ask questions as they arise throughout their visit. I reviewed our electronic medical record system for any past medical records that were available that may contribute to the patients current condition, the nursing notes and and vital signs from today's visit  Nursing notes will be reviewed as they become available in realtime while the pt has been in the ED. Amada Smith MD      HYPERTENSION COUNSELING:  Patient made aware of their elevated blood pressure and is instructed to follow up this week with their Primary Care or [address] [id number] for a recheck. Patient is counseled regarding consequences of chronic, uncontrolled hypertension including kidney disease, heart disease, stroke or even death. Patient states their understanding      I personally reviewed pt's imaging. Official read by radiology noted above. Amada Smith MD         PROGRESS NOTE  10:12 PM  Patient reported to nursing staff that she plans to call her primary care doctor and attempt to get him to call here so that she might receive Dilaudid.   Patient has been informed both on previous also with allergy noted in her chart to Dilaudid. Continue to attempt pain control with her previously prescribed fentanyl Duragesic patch and steroids for inflammation. Patient with unremarkable lab findings for sickle cell pain crisis and negative x-rays. Will plan for discharge with PCP/ hematology/pain management follow-up. [id number]  Progress note:  Pt noted to be feeling better, ready for discharge. Discussed lab and imaging findings with pt and/or family, specifically noting no abnormality, no severe anemia. Pt will follow up with PCP, hematology and pain management as instructed. All questions have been answered, pt voiced understanding and agreement with plan. Specific return precautions provided in addition to instructions for pt to return to the ED immediately should sx worsen at any time. Mendel Branch, MD      Critical Care Time:     none      Diagnosis     Clinical Impression:   1. Sickle cell pain crisis ([facility] [address]    2. Opiate dependence, continuous ([facility] [address]        PLAN:  1. Discharge Medication List as of 5/25/2019 10:32 PM        2. Follow-up Information     Follow up With Specialties Details Why Contact Info    Suha Hunt MD Internal Medicine Schedule an appointment as soon as possible for a visit in 2 days  [address]  [facility] [address] [facility]      Jeromy Griffin MD Hematology and Oncology, Hematology, Oncology Schedule an appointment as soon as possible for a visit in 2 days  [address] [facility]  [address]  [facility].  [phone]          Return to ED if worse     Disposition:    [id number]   The patient's results have been reviewed with family and/or caregiver.  They verbally convey their understanding and agreement of the patient's signs, symptoms, diagnosis, treatment and prognosis and additionally agree to follow up as recommended in the discharge instructions or to return to the Emergency Room should the patient's condition change prior to their follow-up appointment. The family and/or caregiver verbally agrees with the care-plan and all of their questions have been answered. The discharge instructions have also been provided to the them with educational information regarding the patient's diagnosis as well a list of reasons why the patient would want to return to the ER prior to their follow-up appointment should their condition change. Sita Prajapati MD            Please note that this dictation was completed with [employer], the computer voice recognition software. Quite often unanticipated grammatical, syntax, homophones, and other interpretive errors are inadvertently transcribed by the computer software. Please disregard these errors. Please excuse any errors that have escaped final proofreading. This note will not be viewable in [address]

## 2019-05-26 NOTE — ED NOTES
Patient states that we Orien Fransisco liars because we do have Dilaudid and she was admitted here and that's what they gave me\". Pt agrees to take combination of fentanyl, benadryl, and solumedrol as prescribed.

## 2019-05-27 LAB
ANION GAP SERPL CALC-SCNC: 6 MMOL/L (ref 5–15)
BUN SERPL-MCNC: 9 MG/DL (ref 6–20)
BUN/CREAT SERPL: 12 (ref 12–20)
CALCIUM SERPL-MCNC: 7.8 MG/DL (ref 8.5–10.1)
CHLORIDE SERPL-SCNC: 109 MMOL/L (ref 97–108)
CO2 SERPL-SCNC: 22 MMOL/L (ref 21–32)
CREAT SERPL-MCNC: 0.75 MG/DL (ref 0.55–1.02)
ERYTHROCYTE [DISTWIDTH] IN BLOOD BY AUTOMATED COUNT: 15.7 % (ref 11.5–14.5)
GLUCOSE SERPL-MCNC: 121 MG/DL (ref 65–100)
HCT VFR BLD AUTO: 22.2 % (ref 35–47)
HGB BLD-MCNC: 7.7 G/DL (ref 11.5–16)
MCH RBC QN AUTO: 32.2 PG (ref 26–34)
MCHC RBC AUTO-ENTMCNC: 34.7 G/DL (ref 30–36.5)
MCV RBC AUTO: 92.9 FL (ref 80–99)
NRBC # BLD: 0.06 K/UL (ref 0–0.01)
NRBC BLD-RTO: 0.4 PER 100 WBC
PLATELET # BLD AUTO: 121 K/UL (ref 150–400)
PMV BLD AUTO: 11.4 FL (ref 8.9–12.9)
POTASSIUM SERPL-SCNC: 3.9 MMOL/L (ref 3.5–5.1)
RBC # BLD AUTO: 2.39 M/UL (ref 3.8–5.2)
SODIUM SERPL-SCNC: 137 MMOL/L (ref 136–145)
WBC # BLD AUTO: 14.3 K/UL (ref 3.6–11)

## 2019-05-27 PROCEDURE — 65660000000 HC RM CCU STEPDOWN

## 2019-05-27 PROCEDURE — 74011250636 HC RX REV CODE- 250/636: Performed by: HOSPITALIST

## 2019-05-27 PROCEDURE — 80048 BASIC METABOLIC PNL TOTAL CA: CPT

## 2019-05-27 PROCEDURE — 94760 N-INVAS EAR/PLS OXIMETRY 1: CPT

## 2019-05-27 PROCEDURE — 36415 COLL VENOUS BLD VENIPUNCTURE: CPT

## 2019-05-27 PROCEDURE — 85027 COMPLETE CBC AUTOMATED: CPT

## 2019-05-27 PROCEDURE — 74011250636 HC RX REV CODE- 250/636: Performed by: INTERNAL MEDICINE

## 2019-05-27 PROCEDURE — 74011250637 HC RX REV CODE- 250/637: Performed by: HOSPITALIST

## 2019-05-27 RX ORDER — DIPHENHYDRAMINE HYDROCHLORIDE 50 MG/ML
50 INJECTION, SOLUTION INTRAMUSCULAR; INTRAVENOUS
Status: DISCONTINUED | OUTPATIENT
Start: 2019-05-27 | End: 2019-06-06 | Stop reason: HOSPADM

## 2019-05-27 RX ADMIN — DEXTROSE MONOHYDRATE, SODIUM CHLORIDE, AND POTASSIUM CHLORIDE 150 ML/HR: 50; 4.5; .745 INJECTION, SOLUTION INTRAVENOUS at 08:58

## 2019-05-27 RX ADMIN — HYDROMORPHONE HYDROCHLORIDE 1 MG: 1 INJECTION, SOLUTION INTRAMUSCULAR; INTRAVENOUS; SUBCUTANEOUS at 19:47

## 2019-05-27 RX ADMIN — DEXTROSE MONOHYDRATE, SODIUM CHLORIDE, AND POTASSIUM CHLORIDE 150 ML/HR: 50; 4.5; .745 INJECTION, SOLUTION INTRAVENOUS at 01:53

## 2019-05-27 RX ADMIN — DIPHENHYDRAMINE HYDROCHLORIDE 50 MG: 50 INJECTION, SOLUTION INTRAMUSCULAR; INTRAVENOUS at 22:48

## 2019-05-27 RX ADMIN — DIPHENHYDRAMINE HYDROCHLORIDE 50 MG: 50 INJECTION, SOLUTION INTRAMUSCULAR; INTRAVENOUS at 18:40

## 2019-05-27 RX ADMIN — DEXTROSE MONOHYDRATE, SODIUM CHLORIDE, AND POTASSIUM CHLORIDE 150 ML/HR: 50; 4.5; .745 INJECTION, SOLUTION INTRAVENOUS at 22:06

## 2019-05-27 RX ADMIN — CITALOPRAM 10 MG: 20 TABLET ORAL at 20:43

## 2019-05-27 RX ADMIN — DIPHENHYDRAMINE HYDROCHLORIDE 25 MG: 25 CAPSULE ORAL at 05:28

## 2019-05-27 RX ADMIN — ONDANSETRON 4 MG: 2 INJECTION INTRAMUSCULAR; INTRAVENOUS at 14:34

## 2019-05-27 RX ADMIN — HYDROMORPHONE HYDROCHLORIDE 1 MG: 1 INJECTION, SOLUTION INTRAMUSCULAR; INTRAVENOUS; SUBCUTANEOUS at 16:32

## 2019-05-27 RX ADMIN — DEXTROSE MONOHYDRATE, SODIUM CHLORIDE, AND POTASSIUM CHLORIDE 150 ML/HR: 50; 4.5; .745 INJECTION, SOLUTION INTRAVENOUS at 15:15

## 2019-05-27 RX ADMIN — Medication 10 ML: at 14:34

## 2019-05-27 RX ADMIN — Medication 10 ML: at 13:11

## 2019-05-27 RX ADMIN — HYDROMORPHONE HYDROCHLORIDE 1 MG: 1 INJECTION, SOLUTION INTRAMUSCULAR; INTRAVENOUS; SUBCUTANEOUS at 09:15

## 2019-05-27 RX ADMIN — DIPHENHYDRAMINE HYDROCHLORIDE 50 MG: 50 INJECTION, SOLUTION INTRAMUSCULAR; INTRAVENOUS at 10:37

## 2019-05-27 RX ADMIN — Medication 10 ML: at 00:20

## 2019-05-27 RX ADMIN — HYDROMORPHONE HYDROCHLORIDE 1 MG: 1 INJECTION, SOLUTION INTRAMUSCULAR; INTRAVENOUS; SUBCUTANEOUS at 13:11

## 2019-05-27 RX ADMIN — HYDROMORPHONE HYDROCHLORIDE 1 MG: 1 INJECTION, SOLUTION INTRAMUSCULAR; INTRAVENOUS; SUBCUTANEOUS at 06:18

## 2019-05-27 RX ADMIN — DOCUSATE SODIUM 100 MG: 100 CAPSULE, LIQUID FILLED ORAL at 08:59

## 2019-05-27 RX ADMIN — ONDANSETRON 4 MG: 2 INJECTION INTRAMUSCULAR; INTRAVENOUS at 20:44

## 2019-05-27 RX ADMIN — FOLIC ACID 1 MG: 1 TABLET ORAL at 22:44

## 2019-05-27 RX ADMIN — LOSARTAN POTASSIUM 50 MG: 50 TABLET ORAL at 22:44

## 2019-05-27 RX ADMIN — ONDANSETRON 4 MG: 2 INJECTION INTRAMUSCULAR; INTRAVENOUS at 05:28

## 2019-05-27 RX ADMIN — DOCUSATE SODIUM 100 MG: 100 CAPSULE, LIQUID FILLED ORAL at 17:20

## 2019-05-27 RX ADMIN — HYDROMORPHONE HYDROCHLORIDE 1 MG: 1 INJECTION, SOLUTION INTRAMUSCULAR; INTRAVENOUS; SUBCUTANEOUS at 00:20

## 2019-05-27 RX ADMIN — PANTOPRAZOLE SODIUM 40 MG: 40 TABLET, DELAYED RELEASE ORAL at 06:18

## 2019-05-27 RX ADMIN — Medication 10 ML: at 05:27

## 2019-05-27 RX ADMIN — Medication 10 ML: at 22:49

## 2019-05-27 RX ADMIN — ENOXAPARIN SODIUM 40 MG: 40 INJECTION SUBCUTANEOUS at 20:46

## 2019-05-27 RX ADMIN — DIPHENHYDRAMINE HYDROCHLORIDE 50 MG: 50 INJECTION, SOLUTION INTRAMUSCULAR; INTRAVENOUS at 14:34

## 2019-05-27 RX ADMIN — HYDROMORPHONE HYDROCHLORIDE 1 MG: 1 INJECTION, SOLUTION INTRAMUSCULAR; INTRAVENOUS; SUBCUTANEOUS at 03:24

## 2019-05-27 RX ADMIN — HYDROMORPHONE HYDROCHLORIDE 1 MG: 1 INJECTION, SOLUTION INTRAMUSCULAR; INTRAVENOUS; SUBCUTANEOUS at 22:48

## 2019-05-27 NOTE — PROGRESS NOTES
General Daily Progress Note    Admit Date: 5/26/2019    Subjective:     Patient continues to complain of pain. .    Current Facility-Administered Medications   Medication Dose Route Frequency    diphenhydrAMINE (BENADRYL) injection 50 mg  50 mg IntraVENous Q4H PRN    acetaminophen (TYLENOL) tablet 650 mg  650 mg Oral Q6H PRN    dextrose 5% - 0.45% NaCl with KCl 10 mEq/L infusion  150 mL/hr IntraVENous CONTINUOUS    sodium chloride (NS) flush 5-40 mL  5-40 mL IntraVENous Q8H    sodium chloride (NS) flush 5-40 mL  5-40 mL IntraVENous PRN    ondansetron (ZOFRAN) injection 4 mg  4 mg IntraVENous Q6H PRN    docusate sodium (COLACE) capsule 100 mg  100 mg Oral BID    enoxaparin (LOVENOX) injection 40 mg  40 mg SubCUTAneous Q24H    HYDROmorphone (PF) (DILAUDID) injection 1 mg  1 mg IntraVENous Q3H PRN    citalopram (CELEXA) tablet 10 mg  10 mg Oral QHS    folic acid (FOLVITE) tablet 1 mg  1 mg Oral QHS    ibuprofen (MOTRIN) tablet 400 mg  400 mg Oral Q6H PRN    pantoprazole (PROTONIX) tablet 40 mg  40 mg Oral ACB    losartan (COZAAR) tablet 50 mg  50 mg Oral QHS        Review of Systems  A comprehensive review of systems was negative.     Objective:     Patient Vitals for the past 24 hrs:   BP Temp Pulse Resp SpO2   05/27/19 0849 146/78 98.5 °F (36.9 °C) 84 18 96 %   05/27/19 0324 147/81 98.4 °F (36.9 °C) 87 18 99 %   05/26/19 2341 145/80 98.4 °F (36.9 °C) 80 16 98 %   05/26/19 2001 138/77 98.1 °F (36.7 °C) 87 16 100 %   05/26/19 1620 126/67 98.4 °F (36.9 °C) 84 18 98 %   05/26/19 1346 151/87 99 °F (37.2 °C) 83 18 99 %   05/26/19 1230 142/77  82 17 99 %   05/26/19 1215 157/80  83 13 100 %   05/26/19 1200 141/85  86 18 100 %   05/26/19 1145 159/81  92 23 99 %   05/26/19 1130 150/86  82 16 100 %   05/26/19 1116   87 18 100 %   05/26/19 1106 (!) 157/93       05/26/19 1100 (!) 157/93  84 19 100 %   05/26/19 1048 (!) 157/94  87 14 100 %   05/26/19 1036   86 16 100 %     05/27 0701 - 05/27 1900  In: 150 [P.O.:150]  Out: -   05/25 1901 - 05/27 0700  In: 3877.5 [I.V.:3877.5]  Out: 600 [Urine:600]    Physical Exam:   Visit Vitals  /78   Pulse 84   Temp 98.5 °F (36.9 °C)   Resp 18   Ht 5' 10\" (1.778 m)   Wt 194 lb 0.1 oz (88 kg)   SpO2 96%   BMI 27.84 kg/m²     General appearance: alert, cooperative, no distress, appears stated age  Neck: supple, symmetrical, trachea midline, no adenopathy, thyroid: not enlarged, symmetric, no tenderness/mass/nodules, no carotid bruit and no JVD  Lungs: clear to auscultation bilaterally  Heart: regular rate and rhythm, S1, S2 normal, no murmur, click, rub or gallop  Abdomen: soft, non-tender.  Bowel sounds normal. No masses,  no organomegaly  Extremities: extremities normal, atraumatic, no cyanosis or edema        Data Review   Recent Results (from the past 24 hour(s))   LACTIC ACID    Collection Time: 05/26/19 11:07 AM   Result Value Ref Range    Lactic acid 0.8 0.4 - 2.0 MMOL/L   METABOLIC PANEL, BASIC    Collection Time: 05/27/19  3:42 AM   Result Value Ref Range    Sodium 137 136 - 145 mmol/L    Potassium 3.9 3.5 - 5.1 mmol/L    Chloride 109 (H) 97 - 108 mmol/L    CO2 22 21 - 32 mmol/L    Anion gap 6 5 - 15 mmol/L    Glucose 121 (H) 65 - 100 mg/dL    BUN 9 6 - 20 MG/DL    Creatinine 0.75 0.55 - 1.02 MG/DL    BUN/Creatinine ratio 12 12 - 20      GFR est AA >60 >60 ml/min/1.73m2    GFR est non-AA >60 >60 ml/min/1.73m2    Calcium 7.8 (L) 8.5 - 10.1 MG/DL   CBC W/O DIFF    Collection Time: 05/27/19  3:42 AM   Result Value Ref Range    WBC 14.3 (H) 3.6 - 11.0 K/uL    RBC 2.39 (L) 3.80 - 5.20 M/uL    HGB 7.7 (L) 11.5 - 16.0 g/dL    HCT 22.2 (L) 35.0 - 47.0 %    MCV 92.9 80.0 - 99.0 FL    MCH 32.2 26.0 - 34.0 PG    MCHC 34.7 30.0 - 36.5 g/dL    RDW 15.7 (H) 11.5 - 14.5 %    PLATELET 723 (L) 499 - 400 K/uL    MPV 11.4 8.9 - 12.9 FL    NRBC 0.4 (H) 0  WBC    ABSOLUTE NRBC 0.06 (H) 0.00 - 0.01 K/uL           Assessment:     Active Problems:    Sickle cell pain crisis ([facility] [address] (9/8/2018)        Plan:     1. Continue treatment of sickle crises. Patient has refused hydroxyurea as well as newer agent with attempt to reduce sickle crises. He is therefore relegated conventional interventional methods. Possible transfusion depending on degree of hemoglobin reduction. 2.  Continue hydration.

## 2019-05-27 NOTE — H&P
Hospitalist Admission Note    NAME: Alethea Torre   :  1960   MRN:  [id number]     Date/Time:  2019 9:13 PM    Patient PCP: Gerhard Pepe MD  ______________________________________________________________________   Assessment & Plan:  Sickle cell pain crisis, POA  --IVF, IV dilaudid prn, needs benadryl with dilaudid due to itching   --this is 5th hospitalization this year for sickle cell pain. She reports difficulty getting fentanyl patch prescribed by PCP due to insurance not covering and having to use breakthrough pain med (norco) more often  --hgb stable  --continue folic acid    HTN  --continue micardis    Depression  --continue celexa    GERD  --continue PPI    Hx hep C, cured with Harvoni per patient    Body mass index is 27.84 kg/m². Code: full  DVT prophylaxis: lovenox  Surrogate decision maker:  daughter        Subjective:   CHIEF COMPLAINT:  Sickle cell pain    HISTORY OF PRESENT ILLNESS:     Alethea Torre is a 66 y.o.  female with SC sickle cell disease and anemia, depression, HTN presents to pain in left shoulder started 3 days ago. Earlier that day had gone out of eat in [place] and was doing a lot of walking in heat which she believes triggered sickle cell pain. She tried norco at home without good relief. Went to ER yesterday and felt there was delay in getting pain treatment. Discharged home but then developed pain in hip b/l, right arm, and lumbar spine. Pain typical of sickle cell pain, 10/10, currently 8/10 with IV dilaudid. Denies chest pain, SOB, fever, chills, n/v, diarhea, bleeding. We were asked to admit for work up and evaluation of the above problems.      Past Medical History:   Diagnosis Date    Anemia     SC hemoglobinopathy    Chronic pain     Depression     Hypertension     Liver disease     hepatitis C; pt reports \"cured\"    Sickle cell disease ([facility] [address]       Past Surgical History:   Procedure Laterality Date  BREAST SURGERY PROCEDURE UNLISTED      2 cysts removed from R breast    CHEST SURGERY PROCEDURE UNLISTED      status post thor for removal of a benign     HX BREAST BIOPSY Right 05/2007    negative    HX CHOLECYSTECTOMY      HX ORTHOPAEDIC      bony curettage of an ostial myelitis focus     Social History     Tobacco Use    Smoking status: Never Smoker    Smokeless tobacco: Never Used   Substance Use Topics    Alcohol use: No      Family History   Problem Relation Age of Onset    Hypertension Mother     Hypertension Father      Allergies   Allergen Reactions    Dilaudid [Hydromorphone (Bulk)] Itching     Can tolerate with benadryl and ondansetron    Percocet [Oxycodone-Acetaminophen] Itching        Prior to Admission medications    Medication Sig Start Date End Date Taking? Authorizing Provider   folic acid (FOLVITE) 1 mg tablet Take 1 mg by mouth nightly. Yes Provider, Historical   telmisartan (MICARDIS) 40 mg tablet Take 40 mg by mouth nightly. Yes Provider, Historical   omeprazole (PRILOSEC OTC) 20 mg tablet Take 20 mg by mouth daily. Yes Other, MD Ean   promethazine (PHENERGAN) 25 mg tablet Take 1 Tab by mouth every eight (8) hours as needed for Nausea. 5/9/19  Yes Omar Wilkins MD   citalopram (CELEXA) 10 mg tablet TAKE 1 TABLET BY MOUTH EVERY NIGHT AT BEDTIME 5/9/19  Yes Omar Wilkins MD   HYDROcodone-acetaminophen [facility])  mg tablet Take 1 Tab by mouth every six (6) hours as needed for Pain for up to 30 days. Max Daily Amount: 4 Tabs. Indications: sickl;e cell crisis 5/2/19 6/1/19 Yes Omar Wilkins MD   ibuprofen (MOTRIN) 200 mg tablet Take 400 mg by mouth every six (6) hours as needed for Pain. Yes Provider, Historical   diphenhydrAMINE (BENADRYL) 25 mg tablet Take 50 mg by mouth daily as needed (for itching with hydrocodone).    Yes Provider, Historical   hydrocortisone (CORTISONE) 1 % topical cream Apply  to affected area two (2) times daily as needed for Skin Irritation. use thin layer   Yes Provider, Historical   fentaNYL (DURAGESIC) 75 mcg/hr 1 Patch by TransDERmal route every seventy-two (72) hours for 30 days. Max Daily Amount: 1 Patch. 19  Raya Johnston MD     REVIEW OF SYSTEMS:  POSITIVE= Bold. Negative = normal text  General:  fever, chills, sweats, generalized weakness, weight loss/gain, loss of appetite  Eyes:  blurred vision, eye pain, loss of vision, diplopia  Ear Nose and Throat:  rhinorrhea, pharyngitis  Respiratory:   cough, sputum production, SOB, wheezing, MARS, pleuritic pain  Cardiology:  chest pain, palpitations, orthopnea, PND, edema, syncope   Gastrointestinal:  abdominal pain, N/V, dysphagia, diarrhea, constipation, bleeding  Genitourinary:  frequency, urgency, dysuria, hematuria, incontinence  Muskuloskeletal :  arthralgia, myalgia  Hematology:  easy bruising, bleeding, lymphadenopathy  Dermatological:  rash, ulceration, pruritis  Endocrine:  hot flashes or polydipsia  Neurological:  headache, dizziness, confusion, focal weakness, paresthesia, memory loss, gait disturbance  Psychological: anxiety, depression, agitation      Objective:   VITALS:    Visit Vitals  /77   Pulse 87   Temp 98.1 °F (36.7 °C)   Resp 16   Ht 5' 10\" (1.778 m)   Wt 88 kg (194 lb 0.1 oz)   SpO2 100%   BMI 27.84 kg/m²     Temp (24hrs), Av.6 °F (37 °C), Min:98.1 °F (36.7 °C), Max:99 °F (37.2 °C)    Body mass index is 27.84 kg/m². PHYSICAL EXAM:    General:    Alert, cooperative, no distress, appears stated age. HEENT: Atraumatic, anicteric sclerae, pink conjunctivae     No oral ulcers, mucosa moist, throat clear. Hearing intact. Neck:  Supple, symmetrical,  thyroid: non tender  Lungs:   Clear to auscultation bilaterally. No Wheezing or Rhonchi. No rales. Chest wall:  No tenderness  No Accessory muscle use. Heart:   Regular  rhythm,  No  murmur   No gallop. No edema. Abdomen:   Soft, non-tender. Not distended.   Bowel sounds normal. No masses  Extremities: No cyanosis. No clubbing  Skin:     Not pale Not Jaundiced  No rashes   Psych:  Good insight. Not depressed. Not anxious or agitated. Neurologic: EOMs intact. No facial asymmetry. No aphasia or slurred speech. Symmetrical strength, Alert and oriented X 3. IMAGING RESULTS:   []       I have personally reviewed the actual   []     CXR  []     CT scan  CXR:  CT :  EKG:   ________________________________________________________________________  Care Plan discussed with:    Comments   Patient y    [facility]:      ________________________________________________________________________  Prophylaxis:  GI PPI   DVT lovenox   ________________________________________________________________________  Recommended Disposition:   Home with Family y   HH/PT/OT/RN    SNF/LTC    JEREMY    ________________________________________________________________________  Code Status:  Full Code y   DNR/DNI    ________________________________________________________________________  TOTAL TIME:  50 minutes      Comments    y Reviewed previous records   >50% of visit spent in counseling and coordination of care  Discussion with patient and/or family and questions answered         ______________________________________________________________________  Kee Avelar MD      Procedures: see electronic medical records for all procedures/Xrays and details which were not copied into this note but were reviewed prior to creation of Plan. LAB DATA REVIEWED:    Recent Results (from the past 24 hour(s))   SAMPLES BEING HELD    Collection Time: 05/26/19  8:35 AM   Result Value Ref Range    SAMPLES BEING HELD LAV PST     COMMENT        Add-on orders for these samples will be processed based on acceptable specimen integrity and analyte stability, which may vary by analyte.    RETICULOCYTE COUNT    Collection Time: 05/26/19  8:35 AM   Result Value Ref Range    Reticulocyte count 4.9 (H) 0.7 - 2.1 %    Absolute Retic Cnt. 0.1579 (H) 0.0164 - 0.0776 M/ul   CBC WITH AUTOMATED DIFF    Collection Time: 05/26/19  8:35 AM   Result Value Ref Range    WBC 5.4 3.6 - 11.0 K/uL    RBC 3.30 (L) 3.80 - 5.20 M/uL    HGB 10.5 (L) 11.5 - 16.0 g/dL    HCT 30.6 (L) 35.0 - 47.0 %    MCV 92.7 80.0 - 99.0 FL    MCH 31.8 26.0 - 34.0 PG    MCHC 34.3 30.0 - 36.5 g/dL    RDW 15.9 (H) 11.5 - 14.5 %    PLATELET 099 362 - 824 K/uL    MPV 12.9 8.9 - 12.9 FL    NRBC 0.6 (H) 0  WBC    ABSOLUTE NRBC 0.03 (H) 0.00 - 0.01 K/uL    NEUTROPHILS 83 (H) 32 - 75 %    LYMPHOCYTES 15 12 - 49 %    MONOCYTES 2 (L) 5 - 13 %    EOSINOPHILS 0 0 - 7 %    BASOPHILS 0 0 - 1 %    IMMATURE GRANULOCYTES 0 0.0 - 0.5 %    ABS. NEUTROPHILS 4.5 1.8 - 8.0 K/UL    ABS. LYMPHOCYTES 0.8 0.8 - 3.5 K/UL    ABS. MONOCYTES 0.1 0.0 - 1.0 K/UL    ABS. EOSINOPHILS 0.0 0.0 - 0.4 K/UL    ABS. BASOPHILS 0.0 0.0 - 0.1 K/UL    ABS. IMM. GRANS. 0.0 0.00 - 0.04 K/UL    DF AUTOMATED      RBC COMMENTS ANISOCYTOSIS  1+        RBC COMMENTS SICKLE CELLS  PRESENT        RBC COMMENTS TARGET CELLS  PRESENT       METABOLIC PANEL, COMPREHENSIVE    Collection Time: 05/26/19  8:35 AM   Result Value Ref Range    Sodium 136 136 - 145 mmol/L    Potassium 3.8 3.5 - 5.1 mmol/L    Chloride 110 (H) 97 - 108 mmol/L    CO2 19 (L) 21 - 32 mmol/L    Anion gap 7 5 - 15 mmol/L    Glucose 160 (H) 65 - 100 mg/dL    BUN 8 6 - 20 MG/DL    Creatinine 0.83 0.55 - 1.02 MG/DL    BUN/Creatinine ratio 10 (L) 12 - 20      GFR est AA >60 >60 ml/min/1.73m2    GFR est non-AA >60 >60 ml/min/1.73m2    Calcium 9.0 8.5 - 10.1 MG/DL    Bilirubin, total 2.3 (H) 0.2 - 1.0 MG/DL    ALT (SGPT) 20 12 - 78 U/L    AST (SGOT) 31 15 - 37 U/L    Alk.  phosphatase 118 (H) 45 - 117 U/L    Protein, total 9.9 (H) 6.4 - 8.2 g/dL    Albumin 4.5 3.5 - 5.0 g/dL    Globulin 5.4 (H) 2.0 - 4.0 g/dL    A-G Ratio 0.8 (L) 1.1 - 2.2     PROTHROMBIN TIME + INR    Collection Time: 05/26/19  9:09 AM   Result Value Ref Range INR 1.3 (H) 0.9 - 1.1      Prothrombin time 13.6 (H) 9.0 - 11.1 sec   LACTIC ACID    Collection Time: 05/26/19 11:07 AM   Result Value Ref Range    Lactic acid 0.8 0.4 - 2.0 MMOL/L

## 2019-05-27 NOTE — PROGRESS NOTES
Plan:  -D/c home with no needs  -Family to transport       Reason for Readmission:     Sickle cell crisis          RRAT Score and Risk Level:    19/Moderate       Level of Readmission:    Many admissions/readmissions for sickle cell crisis over the last year       Care Conference scheduled:   Pt to be discussed in IDRs        Resources/supports as identified by patient/family:   Supportive family        Top Challenges facing patient (as identified by patient/family and CM): Finances/Medication cost?   Able to afford meds    Transportation      Pt drives and has family to assist   Support system or lack thereof? Supportive family        Living arrangements? Lives alone         Self-care/ADLs/Cognition? Independent/Oriented          Current Advanced Directive/Advance Care Plan:  Not on file/Full code            Plan for utilizing home health:   Likely none              Likelihood of additional readmission:   High              Transition of Care Plan:    Home with PCP f/u    CM met with pt to complete assessment and discuss d/c planning. Pt is a 65 yo female admitted for sickle cell crisis. Pt has been admitted multiple times in the last year for the same. Pt lives alone and is independent at baseline, including driving. Pt names her dtr, brother, and parents as supports. Pt states that, other than the fentanyl patch that her insurance will no longer authorize, she has no issues affording her medications. Current plan is for pain management and then d/c to home with no services. Family to transport at d/c. Inbox message sent to [employer]AT[facility] Hooks to notify of admission and plan. CM will continue to follow and assist with d/c planning. Care Management Interventions  PCP Verified by CM: Yes(Dr. Modesta Fong )  Mode of Transport at Discharge:  Other (see comment)(Family )  Transition of Care Consult (CM Consult): Discharge Planning  Discharge Durable Medical Equipment: No  Physical Therapy Consult: No  Occupational Therapy Consult: No  Speech Therapy Consult: No  Current Support Network: Own Home, Lives Alone  Confirm Follow Up Transport: Self  Plan discussed with Pt/Family/Caregiver:  Yes    Mortimer Elders, MSW  Care Manager

## 2019-05-27 NOTE — PROGRESS NOTES
General Surgery End of Shift Nursing Note    Bedside shift change report given to BREANN DAWSON  (oncoming nurse) by Anderson Brewster RN (offgoing nurse). Report included the following information SBAR, Kardex, Intake/Output, MAR, Recent Results and Cardiac Rhythm SR. Shift worked:   7p-7a   Summary of shift:    Pt awake on all rounds until gloria playing scrabble on her phone . Issues for physician to address:   Pt would like to have Zofran and benedryl Q 4h instead of Q6h     Number times ambulated in hallway past shift: 0    Number of times OOB to chair past shift: 0    Pain Management:  Current medication: Dilaudid, Norco  Patient states pain is manageable on current pain medication: NO    GI:    Current diet:  DIET REGULAR 3-4 GM (RENUKA)    Tolerating current diet: YES  Passing flatus: YES  Last Bowel Movement:        Respiratory:    Incentive Spirometer at bedside: NO  Patient instructed on use: NO    Patient Safety:    Falls Score: 1  Bed Alarm On? No  Sitter?  No    Salma Villeda RN

## 2019-05-27 NOTE — PROGRESS NOTES
General Surgery End of Shift Nursing Note    Bedside shift change report given to Adarsh DAWSON  (oncoming nurse). Report included the following information SBAR, Kardex, Intake/Output, MAR, Recent Results and Cardiac Rhythm SR. Shift worked:   7a-7p   Summary of shift:    Pt rested quietly in room most of day. She had several visitors. Biggest issues was pain and itching control. Issues for physician to address:  none       Number times ambulated in hallway past shift: 0    Number of times OOB to chair past shift: 0    Pain Management:  Current medication: Dilaudid,   Patient states pain is manageable on current pain medication: NO    GI:    Current diet:  DIET REGULAR 3-4 GM (RENUKA)    Tolerating current diet: YES  Passing flatus: YES  Last Bowel Movement:        Respiratory:    Incentive Spirometer at bedside: YES  Patient instructed on use: YES    Patient Safety:    Falls Score: 1  Bed Alarm On? No  Sitter?  No    Shelby Retana RN

## 2019-05-28 LAB
ALBUMIN SERPL-MCNC: 3.4 G/DL (ref 3.5–5)
ALBUMIN/GLOB SERPL: 0.8 {RATIO} (ref 1.1–2.2)
ALP SERPL-CCNC: 103 U/L (ref 45–117)
ALT SERPL-CCNC: 18 U/L (ref 12–78)
ANION GAP SERPL CALC-SCNC: 6 MMOL/L (ref 5–15)
AST SERPL-CCNC: 26 U/L (ref 15–37)
BASOPHILS # BLD: 0 K/UL (ref 0–0.1)
BASOPHILS NFR BLD: 0 % (ref 0–1)
BILIRUB SERPL-MCNC: 2 MG/DL (ref 0.2–1)
BLASTS NFR BLD MANUAL: 0 %
BUN SERPL-MCNC: 3 MG/DL (ref 6–20)
BUN/CREAT SERPL: 4 (ref 12–20)
CALCIUM SERPL-MCNC: 7.9 MG/DL (ref 8.5–10.1)
CHLORIDE SERPL-SCNC: 105 MMOL/L (ref 97–108)
CO2 SERPL-SCNC: 26 MMOL/L (ref 21–32)
CREAT SERPL-MCNC: 0.7 MG/DL (ref 0.55–1.02)
DIFFERENTIAL METHOD BLD: ABNORMAL
EOSINOPHIL # BLD: 0.4 K/UL (ref 0–0.4)
EOSINOPHIL NFR BLD: 3 % (ref 0–7)
ERYTHROCYTE [DISTWIDTH] IN BLOOD BY AUTOMATED COUNT: 15.9 % (ref 11.5–14.5)
GLOBULIN SER CALC-MCNC: 4.3 G/DL (ref 2–4)
GLUCOSE SERPL-MCNC: 126 MG/DL (ref 65–100)
HCT VFR BLD AUTO: 21.2 % (ref 35–47)
HGB BLD-MCNC: 7.6 G/DL (ref 11.5–16)
IMM GRANULOCYTES # BLD AUTO: 0 K/UL
IMM GRANULOCYTES NFR BLD AUTO: 0 %
LYMPHOCYTES # BLD: 7.6 K/UL (ref 0.8–3.5)
LYMPHOCYTES NFR BLD: 56 % (ref 12–49)
MCH RBC QN AUTO: 32.6 PG (ref 26–34)
MCHC RBC AUTO-ENTMCNC: 35.8 G/DL (ref 30–36.5)
MCV RBC AUTO: 91 FL (ref 80–99)
METAMYELOCYTES NFR BLD MANUAL: 0 %
MONOCYTES # BLD: 0.9 K/UL (ref 0–1)
MONOCYTES NFR BLD: 7 % (ref 5–13)
MYELOCYTES NFR BLD MANUAL: 0 %
NEUTS BAND NFR BLD MANUAL: 0 % (ref 0–6)
NEUTS SEG # BLD: 4.6 K/UL (ref 1.8–8)
NEUTS SEG NFR BLD: 34 % (ref 32–75)
NRBC # BLD: 0.12 K/UL (ref 0–0.01)
NRBC BLD-RTO: 0.9 PER 100 WBC
OTHER CELLS NFR BLD MANUAL: 0 %
PLATELET # BLD AUTO: 142 K/UL (ref 150–400)
PMV BLD AUTO: 11.9 FL (ref 8.9–12.9)
POTASSIUM SERPL-SCNC: 3.8 MMOL/L (ref 3.5–5.1)
PROMYELOCYTES NFR BLD MANUAL: 0 %
PROT SERPL-MCNC: 7.7 G/DL (ref 6.4–8.2)
RBC # BLD AUTO: 2.33 M/UL (ref 3.8–5.2)
RBC MORPH BLD: ABNORMAL
SODIUM SERPL-SCNC: 137 MMOL/L (ref 136–145)
WBC # BLD AUTO: 13.5 K/UL (ref 3.6–11)

## 2019-05-28 PROCEDURE — 74011250637 HC RX REV CODE- 250/637: Performed by: HOSPITALIST

## 2019-05-28 PROCEDURE — 65660000000 HC RM CCU STEPDOWN

## 2019-05-28 PROCEDURE — 74011250636 HC RX REV CODE- 250/636: Performed by: INTERNAL MEDICINE

## 2019-05-28 PROCEDURE — 94760 N-INVAS EAR/PLS OXIMETRY 1: CPT

## 2019-05-28 PROCEDURE — 85027 COMPLETE CBC AUTOMATED: CPT

## 2019-05-28 PROCEDURE — 80053 COMPREHEN METABOLIC PANEL: CPT

## 2019-05-28 PROCEDURE — 74011250636 HC RX REV CODE- 250/636: Performed by: HOSPITALIST

## 2019-05-28 PROCEDURE — 74011250637 HC RX REV CODE- 250/637: Performed by: EMERGENCY MEDICINE

## 2019-05-28 PROCEDURE — 36415 COLL VENOUS BLD VENIPUNCTURE: CPT

## 2019-05-28 RX ORDER — FENTANYL 75 UG/H
1 PATCH TRANSDERMAL
Status: DISCONTINUED | OUTPATIENT
Start: 2019-05-29 | End: 2019-06-06 | Stop reason: HOSPADM

## 2019-05-28 RX ADMIN — ACETAMINOPHEN 650 MG: 325 TABLET ORAL at 00:00

## 2019-05-28 RX ADMIN — Medication 10 ML: at 21:02

## 2019-05-28 RX ADMIN — HYDROMORPHONE HYDROCHLORIDE 1 MG: 1 INJECTION, SOLUTION INTRAMUSCULAR; INTRAVENOUS; SUBCUTANEOUS at 04:56

## 2019-05-28 RX ADMIN — HYDROMORPHONE HYDROCHLORIDE 1 MG: 1 INJECTION, SOLUTION INTRAMUSCULAR; INTRAVENOUS; SUBCUTANEOUS at 01:54

## 2019-05-28 RX ADMIN — Medication 10 ML: at 15:02

## 2019-05-28 RX ADMIN — Medication 10 ML: at 06:19

## 2019-05-28 RX ADMIN — PANTOPRAZOLE SODIUM 40 MG: 40 TABLET, DELAYED RELEASE ORAL at 06:20

## 2019-05-28 RX ADMIN — DIPHENHYDRAMINE HYDROCHLORIDE 50 MG: 50 INJECTION, SOLUTION INTRAMUSCULAR; INTRAVENOUS at 03:10

## 2019-05-28 RX ADMIN — DEXTROSE MONOHYDRATE, SODIUM CHLORIDE, AND POTASSIUM CHLORIDE 150 ML/HR: 50; 4.5; .745 INJECTION, SOLUTION INTRAVENOUS at 06:19

## 2019-05-28 RX ADMIN — HYDROMORPHONE HYDROCHLORIDE 1 MG: 1 INJECTION, SOLUTION INTRAMUSCULAR; INTRAVENOUS; SUBCUTANEOUS at 08:48

## 2019-05-28 RX ADMIN — DIPHENHYDRAMINE HYDROCHLORIDE 50 MG: 50 INJECTION, SOLUTION INTRAMUSCULAR; INTRAVENOUS at 11:52

## 2019-05-28 RX ADMIN — ONDANSETRON 4 MG: 2 INJECTION INTRAMUSCULAR; INTRAVENOUS at 15:02

## 2019-05-28 RX ADMIN — ENOXAPARIN SODIUM 40 MG: 40 INJECTION SUBCUTANEOUS at 21:02

## 2019-05-28 RX ADMIN — ONDANSETRON 4 MG: 2 INJECTION INTRAMUSCULAR; INTRAVENOUS at 21:02

## 2019-05-28 RX ADMIN — DIPHENHYDRAMINE HYDROCHLORIDE 50 MG: 50 INJECTION, SOLUTION INTRAMUSCULAR; INTRAVENOUS at 16:02

## 2019-05-28 RX ADMIN — DOCUSATE SODIUM 100 MG: 100 CAPSULE, LIQUID FILLED ORAL at 18:27

## 2019-05-28 RX ADMIN — DIPHENHYDRAMINE HYDROCHLORIDE 50 MG: 50 INJECTION, SOLUTION INTRAMUSCULAR; INTRAVENOUS at 21:17

## 2019-05-28 RX ADMIN — HYDROMORPHONE HYDROCHLORIDE 1 MG: 1 INJECTION, SOLUTION INTRAMUSCULAR; INTRAVENOUS; SUBCUTANEOUS at 11:52

## 2019-05-28 RX ADMIN — FOLIC ACID 1 MG: 1 TABLET ORAL at 21:01

## 2019-05-28 RX ADMIN — ONDANSETRON 4 MG: 2 INJECTION INTRAMUSCULAR; INTRAVENOUS at 03:10

## 2019-05-28 RX ADMIN — HYDROMORPHONE HYDROCHLORIDE 1 MG: 1 INJECTION, SOLUTION INTRAMUSCULAR; INTRAVENOUS; SUBCUTANEOUS at 15:02

## 2019-05-28 RX ADMIN — DIPHENHYDRAMINE HYDROCHLORIDE 50 MG: 50 INJECTION, SOLUTION INTRAMUSCULAR; INTRAVENOUS at 07:11

## 2019-05-28 RX ADMIN — DOCUSATE SODIUM 100 MG: 100 CAPSULE, LIQUID FILLED ORAL at 08:48

## 2019-05-28 RX ADMIN — DEXTROSE MONOHYDRATE, SODIUM CHLORIDE, AND POTASSIUM CHLORIDE 150 ML/HR: 50; 4.5; .745 INJECTION, SOLUTION INTRAVENOUS at 13:51

## 2019-05-28 RX ADMIN — LOSARTAN POTASSIUM 50 MG: 50 TABLET ORAL at 21:01

## 2019-05-28 RX ADMIN — CITALOPRAM 10 MG: 20 TABLET ORAL at 21:01

## 2019-05-28 RX ADMIN — HYDROMORPHONE HYDROCHLORIDE 1 MG: 1 INJECTION, SOLUTION INTRAMUSCULAR; INTRAVENOUS; SUBCUTANEOUS at 18:22

## 2019-05-28 RX ADMIN — HYDROMORPHONE HYDROCHLORIDE 1 MG: 1 INJECTION, SOLUTION INTRAMUSCULAR; INTRAVENOUS; SUBCUTANEOUS at 21:02

## 2019-05-28 NOTE — PROGRESS NOTES
Bedside shift change report given to Buncombeyessi Darden (oncoming nurse) by Radha Nava RN (offgoing nurse). Report included the following information SBAR, ED Summary, Procedure Summary, Intake/Output, MAR, Recent Results, Cardiac Rhythm NSR, Alarm Parameters  and Quality Measures.

## 2019-05-28 NOTE — PROGRESS NOTES
General Surgery End of Shift Nursing Note    Bedside shift change report given to Miranda Vazquez (oncoming nurse) by Maryanne Constantino (offgoing nurse). Report included the following information SBAR, ED Summary and Intake/Output. Shift worked:   7a-7p   Summary of shift:    Pain, itching and nausea controlled with meds. Patient ate all meals in chair. Uneventful shift. Issues for physician to address:   See above     Number times ambulated in hallway past shift: 0    Number of times OOB to chair past shift: 3    Pain Management:  Current medication: see mar  Patient states pain is manageable on current pain medication: YES    GI:    Current diet:  DIET REGULAR    Tolerating current diet: YES  Passing flatus: YES  Last Bowel Movement: yesterday   Appearance: brown    Respiratory:    Incentive Spirometer at bedside: YES  Patient instructed on use: YES    Patient Safety:    Falls Score: 1  Bed Alarm On? No  Sitter?  No    Rodolfo Severs

## 2019-05-29 LAB
ANION GAP SERPL CALC-SCNC: 3 MMOL/L (ref 5–15)
BASOPHILS # BLD: 0.1 K/UL (ref 0–0.1)
BASOPHILS NFR BLD: 1 % (ref 0–1)
BLASTS NFR BLD MANUAL: 0 %
BUN SERPL-MCNC: 4 MG/DL (ref 6–20)
BUN/CREAT SERPL: 5 (ref 12–20)
CALCIUM SERPL-MCNC: 7.8 MG/DL (ref 8.5–10.1)
CHLORIDE SERPL-SCNC: 104 MMOL/L (ref 97–108)
CO2 SERPL-SCNC: 28 MMOL/L (ref 21–32)
CREAT SERPL-MCNC: 0.79 MG/DL (ref 0.55–1.02)
EOSINOPHIL # BLD: 0.5 K/UL (ref 0–0.4)
EOSINOPHIL NFR BLD: 4 % (ref 0–7)
ERYTHROCYTE [DISTWIDTH] IN BLOOD BY AUTOMATED COUNT: 16.5 % (ref 11.5–14.5)
GLUCOSE SERPL-MCNC: 128 MG/DL (ref 65–100)
HCT VFR BLD AUTO: 22.7 % (ref 35–47)
HGB BLD-MCNC: 7.9 G/DL (ref 11.5–16)
IMM GRANULOCYTES # BLD AUTO: 0 K/UL
IMM GRANULOCYTES NFR BLD AUTO: 0 %
LYMPHOCYTES # BLD: 7.8 K/UL (ref 0.8–3.5)
LYMPHOCYTES NFR BLD: 64 % (ref 12–49)
MCH RBC QN AUTO: 32.4 PG (ref 26–34)
MCHC RBC AUTO-ENTMCNC: 34.8 G/DL (ref 30–36.5)
MCV RBC AUTO: 93 FL (ref 80–99)
METAMYELOCYTES NFR BLD MANUAL: 0 %
MONOCYTES # BLD: 0.8 K/UL (ref 0–1)
MONOCYTES NFR BLD: 7 % (ref 5–13)
MYELOCYTES NFR BLD MANUAL: 0 %
NEUTS BAND NFR BLD MANUAL: 0 % (ref 0–6)
NEUTS SEG # BLD: 2.9 K/UL (ref 1.8–8)
NEUTS SEG NFR BLD: 24 % (ref 32–75)
NRBC # BLD: 0.25 K/UL (ref 0–0.01)
NRBC BLD-RTO: 2.1 PER 100 WBC
OTHER CELLS NFR BLD MANUAL: 0 %
PLATELET # BLD AUTO: 171 K/UL (ref 150–400)
PMV BLD AUTO: 12 FL (ref 8.9–12.9)
POTASSIUM SERPL-SCNC: 3.9 MMOL/L (ref 3.5–5.1)
PROMYELOCYTES NFR BLD MANUAL: 0 %
RBC # BLD AUTO: 2.44 M/UL (ref 3.8–5.2)
RBC MORPH BLD: ABNORMAL
SODIUM SERPL-SCNC: 135 MMOL/L (ref 136–145)
WBC # BLD AUTO: 12.1 K/UL (ref 3.6–11)

## 2019-05-29 PROCEDURE — 74011250636 HC RX REV CODE- 250/636: Performed by: HOSPITALIST

## 2019-05-29 PROCEDURE — 74011250637 HC RX REV CODE- 250/637: Performed by: HOSPITALIST

## 2019-05-29 PROCEDURE — 74011250637 HC RX REV CODE- 250/637: Performed by: INTERNAL MEDICINE

## 2019-05-29 PROCEDURE — 80048 BASIC METABOLIC PNL TOTAL CA: CPT

## 2019-05-29 PROCEDURE — 65660000000 HC RM CCU STEPDOWN

## 2019-05-29 PROCEDURE — 74011250636 HC RX REV CODE- 250/636: Performed by: INTERNAL MEDICINE

## 2019-05-29 PROCEDURE — 85027 COMPLETE CBC AUTOMATED: CPT

## 2019-05-29 PROCEDURE — 36415 COLL VENOUS BLD VENIPUNCTURE: CPT

## 2019-05-29 PROCEDURE — 94760 N-INVAS EAR/PLS OXIMETRY 1: CPT

## 2019-05-29 RX ADMIN — PANTOPRAZOLE SODIUM 40 MG: 40 TABLET, DELAYED RELEASE ORAL at 08:46

## 2019-05-29 RX ADMIN — HYDROMORPHONE HYDROCHLORIDE 1 MG: 1 INJECTION, SOLUTION INTRAMUSCULAR; INTRAVENOUS; SUBCUTANEOUS at 06:30

## 2019-05-29 RX ADMIN — FOLIC ACID 1 MG: 1 TABLET ORAL at 21:40

## 2019-05-29 RX ADMIN — ONDANSETRON 4 MG: 2 INJECTION INTRAMUSCULAR; INTRAVENOUS at 02:56

## 2019-05-29 RX ADMIN — DIPHENHYDRAMINE HYDROCHLORIDE 50 MG: 50 INJECTION, SOLUTION INTRAMUSCULAR; INTRAVENOUS at 18:01

## 2019-05-29 RX ADMIN — DIPHENHYDRAMINE HYDROCHLORIDE 50 MG: 50 INJECTION, SOLUTION INTRAMUSCULAR; INTRAVENOUS at 13:47

## 2019-05-29 RX ADMIN — ONDANSETRON 4 MG: 2 INJECTION INTRAMUSCULAR; INTRAVENOUS at 21:40

## 2019-05-29 RX ADMIN — LOSARTAN POTASSIUM 50 MG: 50 TABLET ORAL at 21:40

## 2019-05-29 RX ADMIN — DIPHENHYDRAMINE HYDROCHLORIDE 50 MG: 50 INJECTION, SOLUTION INTRAMUSCULAR; INTRAVENOUS at 21:59

## 2019-05-29 RX ADMIN — HYDROMORPHONE HYDROCHLORIDE 1 MG: 1 INJECTION, SOLUTION INTRAMUSCULAR; INTRAVENOUS; SUBCUTANEOUS at 16:55

## 2019-05-29 RX ADMIN — ENOXAPARIN SODIUM 40 MG: 40 INJECTION SUBCUTANEOUS at 21:39

## 2019-05-29 RX ADMIN — ONDANSETRON 4 MG: 2 INJECTION INTRAMUSCULAR; INTRAVENOUS at 08:46

## 2019-05-29 RX ADMIN — HYDROMORPHONE HYDROCHLORIDE 1 MG: 1 INJECTION, SOLUTION INTRAMUSCULAR; INTRAVENOUS; SUBCUTANEOUS at 23:01

## 2019-05-29 RX ADMIN — Medication 10 ML: at 06:30

## 2019-05-29 RX ADMIN — DOCUSATE SODIUM 100 MG: 100 CAPSULE, LIQUID FILLED ORAL at 18:01

## 2019-05-29 RX ADMIN — DIPHENHYDRAMINE HYDROCHLORIDE 50 MG: 50 INJECTION, SOLUTION INTRAMUSCULAR; INTRAVENOUS at 09:50

## 2019-05-29 RX ADMIN — HYDROMORPHONE HYDROCHLORIDE 1 MG: 1 INJECTION, SOLUTION INTRAMUSCULAR; INTRAVENOUS; SUBCUTANEOUS at 13:47

## 2019-05-29 RX ADMIN — Medication 10 ML: at 21:40

## 2019-05-29 RX ADMIN — Medication 10 ML: at 13:47

## 2019-05-29 RX ADMIN — HYDROMORPHONE HYDROCHLORIDE 1 MG: 1 INJECTION, SOLUTION INTRAMUSCULAR; INTRAVENOUS; SUBCUTANEOUS at 20:00

## 2019-05-29 RX ADMIN — HYDROMORPHONE HYDROCHLORIDE 1 MG: 1 INJECTION, SOLUTION INTRAMUSCULAR; INTRAVENOUS; SUBCUTANEOUS at 02:56

## 2019-05-29 RX ADMIN — HYDROMORPHONE HYDROCHLORIDE 1 MG: 1 INJECTION, SOLUTION INTRAMUSCULAR; INTRAVENOUS; SUBCUTANEOUS at 10:19

## 2019-05-29 RX ADMIN — DIPHENHYDRAMINE HYDROCHLORIDE 50 MG: 50 INJECTION, SOLUTION INTRAMUSCULAR; INTRAVENOUS at 02:56

## 2019-05-29 RX ADMIN — CITALOPRAM 10 MG: 20 TABLET ORAL at 21:39

## 2019-05-29 RX ADMIN — DOCUSATE SODIUM 100 MG: 100 CAPSULE, LIQUID FILLED ORAL at 08:46

## 2019-05-29 RX ADMIN — ONDANSETRON 4 MG: 2 INJECTION INTRAMUSCULAR; INTRAVENOUS at 15:05

## 2019-05-29 RX ADMIN — HYDROMORPHONE HYDROCHLORIDE 1 MG: 1 INJECTION, SOLUTION INTRAMUSCULAR; INTRAVENOUS; SUBCUTANEOUS at 00:00

## 2019-05-29 RX ADMIN — DEXTROSE MONOHYDRATE, SODIUM CHLORIDE, AND POTASSIUM CHLORIDE 100 ML/HR: 50; 4.5; .745 INJECTION, SOLUTION INTRAVENOUS at 15:05

## 2019-05-29 NOTE — PROGRESS NOTES
General Surgery End of Shift Nursing Note    Bedside shift change report given to Gabrielle Solomon RN  (oncoming nurse). Report included the following information SBAR, Kardex, Intake/Output, MAR, Recent Results and Cardiac Rhythm SR. Shift worked:   7a-7p   Summary of shift:    Pt rested quietly in room most of day. She had several visitors. Biggest issues was pain and itching control. Issues for physician to address:  none       Number times ambulated in hallway past shift: 0    Number of times OOB to chair past shift: 0    Pain Management:  Current medication: Dilaudid,   Patient states pain is manageable on current pain medication: NO    GI:    Current diet:  DIET REGULAR    Tolerating current diet: YES  Passing flatus: YES  Last Bowel Movement:        Respiratory:    Incentive Spirometer at bedside: YES  Patient instructed on use: YES    Patient Safety:    Falls Score: 1  Bed Alarm On? No  Sitter?  No    Marito Blakely RN

## 2019-05-29 NOTE — PROGRESS NOTES
General Daily Progress Note    Admit Date: 5/26/2019    Subjective:     Patient c/o pain. Current Facility-Administered Medications   Medication Dose Route Frequency    fentaNYL (DURAGESIC) 75 mcg/hr patch 1 Patch  1 Patch TransDERmal Q72H    diphenhydrAMINE (BENADRYL) injection 50 mg  50 mg IntraVENous Q4H PRN    acetaminophen (TYLENOL) tablet 650 mg  650 mg Oral Q6H PRN    dextrose 5% - 0.45% NaCl with KCl 10 mEq/L infusion  100 mL/hr IntraVENous CONTINUOUS    sodium chloride (NS) flush 5-40 mL  5-40 mL IntraVENous Q8H    sodium chloride (NS) flush 5-40 mL  5-40 mL IntraVENous PRN    ondansetron (ZOFRAN) injection 4 mg  4 mg IntraVENous Q6H PRN    docusate sodium (COLACE) capsule 100 mg  100 mg Oral BID    enoxaparin (LOVENOX) injection 40 mg  40 mg SubCUTAneous Q24H    HYDROmorphone (PF) (DILAUDID) injection 1 mg  1 mg IntraVENous Q3H PRN    citalopram (CELEXA) tablet 10 mg  10 mg Oral QHS    folic acid (FOLVITE) tablet 1 mg  1 mg Oral QHS    pantoprazole (PROTONIX) tablet 40 mg  40 mg Oral ACB    losartan (COZAAR) tablet 50 mg  50 mg Oral QHS        Review of Systems  A comprehensive review of systems was negative. Objective:     Patient Vitals for the past 24 hrs:   BP Temp Pulse Resp SpO2   05/29/19 0822 140/85 99.1 °F (37.3 °C) 95 18 96 %   05/29/19 0328 125/73 99.4 °F (37.4 °C) 93 18 94 %   05/28/19 2341 129/71 98.9 °F (37.2 °C) 100 18 95 %   05/28/19 1905 172/76 98.6 °F (37 °C) 98 18 97 %   05/28/19 1528 178/86  92  97 %   05/28/19 1450 176/87 99.7 °F (37.6 °C) 96 18 97 %   05/28/19 1105 175/81 99.8 °F (37.7 °C) 100 18 96 %     05/29 0701 - 05/29 1900  In: 178.3 [I.V.:178.3]  Out: -   05/27 1901 - 05/29 0700  In: 9661.7 [P.O.:1240;  I.V.:8421.7]  Out: 900 [Urine:900]    Physical Exam:   Visit Vitals  /85   Pulse 95   Temp 99.1 °F (37.3 °C)   Resp 18   Ht 5' 10\" (1.778 m)   Wt 194 lb 0.1 oz (88 kg)   SpO2 96%   BMI 27.84 kg/m²     General appearance: alert, cooperative, no distress, appears stated age  Neck: supple, symmetrical, trachea midline, no adenopathy, thyroid: not enlarged, symmetric, no tenderness/mass/nodules, no carotid bruit and no JVD  Lungs: clear to auscultation bilaterally  Heart: regular rate and rhythm, S1, S2 normal, no murmur, click, rub or gallop  Abdomen: soft, non-tender. Bowel sounds normal. No masses,  no organomegaly  Extremities: extremities normal, atraumatic, no cyanosis or edema        Data Review   Recent Results (from the past 24 hour(s))   METABOLIC PANEL, BASIC    Collection Time: 05/29/19  2:31 AM   Result Value Ref Range    Sodium 135 (L) 136 - 145 mmol/L    Potassium 3.9 3.5 - 5.1 mmol/L    Chloride 104 97 - 108 mmol/L    CO2 28 21 - 32 mmol/L    Anion gap 3 (L) 5 - 15 mmol/L    Glucose 128 (H) 65 - 100 mg/dL    BUN 4 (L) 6 - 20 MG/DL    Creatinine 0.79 0.55 - 1.02 MG/DL    BUN/Creatinine ratio 5 (L) 12 - 20      GFR est AA >60 >60 ml/min/1.73m2    GFR est non-AA >60 >60 ml/min/1.73m2    Calcium 7.8 (L) 8.5 - 10.1 MG/DL   CBC WITH MANUAL DIFF    Collection Time: 05/29/19  2:31 AM   Result Value Ref Range    WBC 12.1 (H) 3.6 - 11.0 K/uL    RBC 2.44 (L) 3.80 - 5.20 M/uL    HGB 7.9 (L) 11.5 - 16.0 g/dL    HCT 22.7 (L) 35.0 - 47.0 %    MCV 93.0 80.0 - 99.0 FL    MCH 32.4 26.0 - 34.0 PG    MCHC 34.8 30.0 - 36.5 g/dL    RDW 16.5 (H) 11.5 - 14.5 %    PLATELET 894 593 - 885 K/uL    MPV 12.0 8.9 - 12.9 FL    NRBC 2.1 (H) 0  WBC    ABSOLUTE NRBC 0.25 (H) 0.00 - 0.01 K/uL    NEUTROPHILS 24 (L) 32 - 75 %    BAND NEUTROPHILS 0 0 - 6 %    LYMPHOCYTES 64 (H) 12 - 49 %    MONOCYTES 7 5 - 13 %    EOSINOPHILS 4 0 - 7 %    BASOPHILS 1 0 - 1 %    METAMYELOCYTES 0 0 %    MYELOCYTES 0 0 %    PROMYELOCYTES 0 0 %    BLASTS 0 0 %    OTHER CELL 0 0      IMMATURE GRANULOCYTES 0 %    ABS. NEUTROPHILS 2.9 1.8 - 8.0 K/UL    ABS. LYMPHOCYTES 7.8 (H) 0.8 - 3.5 K/UL    ABS. MONOCYTES 0.8 0.0 - 1.0 K/UL    ABS. EOSINOPHILS 0.5 (H) 0.0 - 0.4 K/UL    ABS.  BASOPHILS 0.1 0.0 - 0.1 K/UL    ABS. IMM. GRANS. 0.0 K/UL    RBC COMMENTS ANISOCYTOSIS  1+        RBC COMMENTS MACROCYTOSIS  1+        RBC COMMENTS TARGET CELLS  PRESENT        RBC COMMENTS HYPOCHROMIA  1+               Assessment:     Active Problems:    Sickle cell pain crisis ([facility] [address] (9/8/2018)        Plan:     1. Continue treatment of painful crises. Hemoglobin reasonably stable.

## 2019-05-29 NOTE — PROGRESS NOTES
Spiritual Care Partner Volunteer visited patient in General Surgery on May 29, 2019. Documented by:  KRUNAL Contreras  Paging Service [phone])

## 2019-05-29 NOTE — PROGRESS NOTES
Problem: Falls - Risk of  Goal: *Absence of Falls  Description  Document Aylin Reilly Fall Risk and appropriate interventions in the flowsheet.   Outcome: Progressing Towards Goal     Problem: Patient Education: Go to Patient Education Activity  Goal: Patient/Family Education  Outcome: Progressing Towards Goal

## 2019-05-29 NOTE — PROGRESS NOTES
General Daily Progress Note    Admit Date: 5/26/2019    Subjective:     Patient continues to complain of pain. Current Facility-Administered Medications   Medication Dose Route Frequency    [START ON 5/29/2019] fentaNYL (DURAGESIC) 75 mcg/hr patch 1 Patch  1 Patch TransDERmal Q72H    diphenhydrAMINE (BENADRYL) injection 50 mg  50 mg IntraVENous Q4H PRN    acetaminophen (TYLENOL) tablet 650 mg  650 mg Oral Q6H PRN    dextrose 5% - 0.45% NaCl with KCl 10 mEq/L infusion  100 mL/hr IntraVENous CONTINUOUS    sodium chloride (NS) flush 5-40 mL  5-40 mL IntraVENous Q8H    sodium chloride (NS) flush 5-40 mL  5-40 mL IntraVENous PRN    ondansetron (ZOFRAN) injection 4 mg  4 mg IntraVENous Q6H PRN    docusate sodium (COLACE) capsule 100 mg  100 mg Oral BID    enoxaparin (LOVENOX) injection 40 mg  40 mg SubCUTAneous Q24H    HYDROmorphone (PF) (DILAUDID) injection 1 mg  1 mg IntraVENous Q3H PRN    citalopram (CELEXA) tablet 10 mg  10 mg Oral QHS    folic acid (FOLVITE) tablet 1 mg  1 mg Oral QHS    pantoprazole (PROTONIX) tablet 40 mg  40 mg Oral ACB    losartan (COZAAR) tablet 50 mg  50 mg Oral QHS        Review of Systems  A comprehensive review of systems was negative. Objective:     Patient Vitals for the past 24 hrs:   BP Temp Pulse Resp SpO2   05/28/19 1905 172/76 98.6 °F (37 °C) 98 18 97 %   05/28/19 1528 178/86  92  97 %   05/28/19 1450 176/87 99.7 °F (37.6 °C) 96 18 97 %   05/28/19 1105 175/81 99.8 °F (37.7 °C) 100 18 96 %   05/28/19 0741 156/82 98.6 °F (37 °C) 90 18 98 %   05/28/19 0456 144/83 98.7 °F (37.1 °C) 84 18 96 %   05/27/19 2355 163/84 98.8 °F (37.1 °C) 99 18 97 %     05/28 1901 - 05/29 0700  In: 240 [P.O.:240]  Out: -   05/27 0701 - 05/28 1900  In: 8027 [P.O.:1400;  I.V.:6890]  Out: 1200 [Urine:1200]    Physical Exam:   Visit Vitals  /76   Pulse 98   Temp 98.6 °F (37 °C)   Resp 18   Ht 5' 10\" (1.778 m)   Wt 194 lb 0.1 oz (88 kg)   SpO2 97%   BMI 27.84 kg/m²     General appearance: alert, cooperative, no distress, appears stated age  Neck: supple, symmetrical, trachea midline, no adenopathy, thyroid: not enlarged, symmetric, no tenderness/mass/nodules, no carotid bruit and no JVD  Lungs: clear to auscultation bilaterally  Heart: regular rate and rhythm, S1, S2 normal, no murmur, click, rub or gallop  Abdomen: soft, non-tender. Bowel sounds normal. No masses,  no organomegaly  Extremities: extremities normal, atraumatic, no cyanosis or edema  Neurologic: Grossly normal        Data Review   Recent Results (from the past 24 hour(s))   CBC WITH MANUAL DIFF    Collection Time: 05/28/19  3:14 AM   Result Value Ref Range    WBC 13.5 (H) 3.6 - 11.0 K/uL    RBC 2.33 (L) 3.80 - 5.20 M/uL    HGB 7.6 (L) 11.5 - 16.0 g/dL    HCT 21.2 (L) 35.0 - 47.0 %    MCV 91.0 80.0 - 99.0 FL    MCH 32.6 26.0 - 34.0 PG    MCHC 35.8 30.0 - 36.5 g/dL    RDW 15.9 (H) 11.5 - 14.5 %    PLATELET 303 (L) 445 - 400 K/uL    MPV 11.9 8.9 - 12.9 FL    NRBC 0.9 (H) 0  WBC    ABSOLUTE NRBC 0.12 (H) 0.00 - 0.01 K/uL    NEUTROPHILS 34 32 - 75 %    BAND NEUTROPHILS 0 0 - 6 %    LYMPHOCYTES 56 (H) 12 - 49 %    MONOCYTES 7 5 - 13 %    EOSINOPHILS 3 0 - 7 %    BASOPHILS 0 0 - 1 %    METAMYELOCYTES 0 0 %    MYELOCYTES 0 0 %    PROMYELOCYTES 0 0 %    BLASTS 0 0 %    OTHER CELL 0 0      IMMATURE GRANULOCYTES 0 %    ABS. NEUTROPHILS 4.6 1.8 - 8.0 K/UL    ABS. LYMPHOCYTES 7.6 (H) 0.8 - 3.5 K/UL    ABS. MONOCYTES 0.9 0.0 - 1.0 K/UL    ABS. EOSINOPHILS 0.4 0.0 - 0.4 K/UL    ABS. BASOPHILS 0.0 0.0 - 0.1 K/UL    ABS. IMM.  GRANS. 0.0 K/UL    DF MANUAL      RBC COMMENTS ANISOCYTOSIS  1+        RBC COMMENTS TARGET CELLS  2+        RBC COMMENTS POLYCHROMASIA  1+        RBC COMMENTS OVALOCYTES  1+       METABOLIC PANEL, COMPREHENSIVE    Collection Time: 05/28/19  3:14 AM   Result Value Ref Range    Sodium 137 136 - 145 mmol/L    Potassium 3.8 3.5 - 5.1 mmol/L    Chloride 105 97 - 108 mmol/L    CO2 26 21 - 32 mmol/L    Anion gap 6 5 - 15 mmol/L Glucose 126 (H) 65 - 100 mg/dL    BUN 3 (L) 6 - 20 MG/DL    Creatinine 0.70 0.55 - 1.02 MG/DL    BUN/Creatinine ratio 4 (L) 12 - 20      GFR est AA >60 >60 ml/min/1.73m2    GFR est non-AA >60 >60 ml/min/1.73m2    Calcium 7.9 (L) 8.5 - 10.1 MG/DL    Bilirubin, total 2.0 (H) 0.2 - 1.0 MG/DL    ALT (SGPT) 18 12 - 78 U/L    AST (SGOT) 26 15 - 37 U/L    Alk. phosphatase 103 45 - 117 U/L    Protein, total 7.7 6.4 - 8.2 g/dL    Albumin 3.4 (L) 3.5 - 5.0 g/dL    Globulin 4.3 (H) 2.0 - 4.0 g/dL    A-G Ratio 0.8 (L) 1.1 - 2.2             Assessment:     Active Problems:    Sickle cell pain crisis ([facility] [address] (9/8/2018)        Plan:     1. Continue treatment of painful crises.

## 2019-05-30 LAB
GLUCOSE BLD STRIP.AUTO-MCNC: 110 MG/DL (ref 65–100)
SERVICE CMNT-IMP: ABNORMAL

## 2019-05-30 PROCEDURE — 74011250636 HC RX REV CODE- 250/636: Performed by: INTERNAL MEDICINE

## 2019-05-30 PROCEDURE — 82962 GLUCOSE BLOOD TEST: CPT

## 2019-05-30 PROCEDURE — 74011250636 HC RX REV CODE- 250/636: Performed by: HOSPITALIST

## 2019-05-30 PROCEDURE — 74011250637 HC RX REV CODE- 250/637: Performed by: INTERNAL MEDICINE

## 2019-05-30 PROCEDURE — 94760 N-INVAS EAR/PLS OXIMETRY 1: CPT

## 2019-05-30 PROCEDURE — 65660000000 HC RM CCU STEPDOWN

## 2019-05-30 PROCEDURE — 74011250637 HC RX REV CODE- 250/637: Performed by: HOSPITALIST

## 2019-05-30 RX ADMIN — DIPHENHYDRAMINE HYDROCHLORIDE 50 MG: 50 INJECTION, SOLUTION INTRAMUSCULAR; INTRAVENOUS at 06:15

## 2019-05-30 RX ADMIN — DIPHENHYDRAMINE HYDROCHLORIDE 50 MG: 50 INJECTION, SOLUTION INTRAMUSCULAR; INTRAVENOUS at 22:53

## 2019-05-30 RX ADMIN — Medication 10 ML: at 22:01

## 2019-05-30 RX ADMIN — DIPHENHYDRAMINE HYDROCHLORIDE 50 MG: 50 INJECTION, SOLUTION INTRAMUSCULAR; INTRAVENOUS at 18:55

## 2019-05-30 RX ADMIN — ONDANSETRON 4 MG: 2 INJECTION INTRAMUSCULAR; INTRAVENOUS at 06:15

## 2019-05-30 RX ADMIN — DIPHENHYDRAMINE HYDROCHLORIDE 50 MG: 50 INJECTION, SOLUTION INTRAMUSCULAR; INTRAVENOUS at 14:39

## 2019-05-30 RX ADMIN — LOSARTAN POTASSIUM 50 MG: 50 TABLET ORAL at 21:45

## 2019-05-30 RX ADMIN — HYDROMORPHONE HYDROCHLORIDE 1 MG: 1 INJECTION, SOLUTION INTRAMUSCULAR; INTRAVENOUS; SUBCUTANEOUS at 02:01

## 2019-05-30 RX ADMIN — DOCUSATE SODIUM 100 MG: 100 CAPSULE, LIQUID FILLED ORAL at 08:18

## 2019-05-30 RX ADMIN — DOCUSATE SODIUM 100 MG: 100 CAPSULE, LIQUID FILLED ORAL at 18:55

## 2019-05-30 RX ADMIN — DIPHENHYDRAMINE HYDROCHLORIDE 50 MG: 50 INJECTION, SOLUTION INTRAMUSCULAR; INTRAVENOUS at 02:01

## 2019-05-30 RX ADMIN — HYDROMORPHONE HYDROCHLORIDE 1 MG: 1 INJECTION, SOLUTION INTRAMUSCULAR; INTRAVENOUS; SUBCUTANEOUS at 09:17

## 2019-05-30 RX ADMIN — CITALOPRAM 10 MG: 20 TABLET ORAL at 20:04

## 2019-05-30 RX ADMIN — HYDROMORPHONE HYDROCHLORIDE 1 MG: 1 INJECTION, SOLUTION INTRAMUSCULAR; INTRAVENOUS; SUBCUTANEOUS at 18:55

## 2019-05-30 RX ADMIN — HYDROMORPHONE HYDROCHLORIDE 1 MG: 1 INJECTION, SOLUTION INTRAMUSCULAR; INTRAVENOUS; SUBCUTANEOUS at 21:58

## 2019-05-30 RX ADMIN — Medication 10 ML: at 14:39

## 2019-05-30 RX ADMIN — LACTULOSE 30 ML: 10 SOLUTION ORAL at 18:55

## 2019-05-30 RX ADMIN — HYDROMORPHONE HYDROCHLORIDE 1 MG: 1 INJECTION, SOLUTION INTRAMUSCULAR; INTRAVENOUS; SUBCUTANEOUS at 12:18

## 2019-05-30 RX ADMIN — ENOXAPARIN SODIUM 40 MG: 40 INJECTION SUBCUTANEOUS at 20:04

## 2019-05-30 RX ADMIN — DEXTROSE MONOHYDRATE, SODIUM CHLORIDE, AND POTASSIUM CHLORIDE 100 ML/HR: 50; 4.5; .745 INJECTION, SOLUTION INTRAVENOUS at 10:28

## 2019-05-30 RX ADMIN — ONDANSETRON 4 MG: 2 INJECTION INTRAMUSCULAR; INTRAVENOUS at 18:55

## 2019-05-30 RX ADMIN — DIPHENHYDRAMINE HYDROCHLORIDE 50 MG: 50 INJECTION, SOLUTION INTRAMUSCULAR; INTRAVENOUS at 10:32

## 2019-05-30 RX ADMIN — PANTOPRAZOLE SODIUM 40 MG: 40 TABLET, DELAYED RELEASE ORAL at 08:17

## 2019-05-30 RX ADMIN — FOLIC ACID 1 MG: 1 TABLET ORAL at 21:45

## 2019-05-30 RX ADMIN — ONDANSETRON 4 MG: 2 INJECTION INTRAMUSCULAR; INTRAVENOUS at 12:18

## 2019-05-30 RX ADMIN — DEXTROSE MONOHYDRATE, SODIUM CHLORIDE, AND POTASSIUM CHLORIDE 100 ML/HR: 50; 4.5; .745 INJECTION, SOLUTION INTRAVENOUS at 20:03

## 2019-05-30 RX ADMIN — HYDROMORPHONE HYDROCHLORIDE 1 MG: 1 INJECTION, SOLUTION INTRAMUSCULAR; INTRAVENOUS; SUBCUTANEOUS at 15:26

## 2019-05-30 RX ADMIN — HYDROMORPHONE HYDROCHLORIDE 1 MG: 1 INJECTION, SOLUTION INTRAMUSCULAR; INTRAVENOUS; SUBCUTANEOUS at 06:15

## 2019-05-30 RX ADMIN — Medication 10 ML: at 06:15

## 2019-05-30 NOTE — PROGRESS NOTES
General Surgery End of Shift Nursing Note    Bedside shift change report given to Adarsh DAWSON  (oncoming nurse). Report included the following information SBAR, Kardex, Intake/Output, MAR, Recent Results and Cardiac Rhythm SR. Shift worked:   7a-7p   Summary of shift:    Pt rested quietly in room most of day. Biggest issues was pain and itching control. Started lactulose for constipation. Issues for physician to address:  none       Number times ambulated in hallway past shift: 0    Number of times OOB to chair past shift: 3    Pain Management:  Current medication: Dilaudid,   Patient states pain is manageable on current pain medication: NO    GI:    Current diet:  DIET REGULAR    Tolerating current diet: YES  Passing flatus: YES  Last Bowel Movement:        Respiratory:    Incentive Spirometer at bedside: YES  Patient instructed on use: YES    Patient Safety:    Falls Score: 1  Bed Alarm On? No  Sitter?  No    Eddy Trivedi RN

## 2019-05-31 LAB
ALBUMIN SERPL-MCNC: 3.5 G/DL (ref 3.5–5)
ALBUMIN/GLOB SERPL: 0.7 {RATIO} (ref 1.1–2.2)
ALP SERPL-CCNC: 121 U/L (ref 45–117)
ALT SERPL-CCNC: 22 U/L (ref 12–78)
ANION GAP SERPL CALC-SCNC: 6 MMOL/L (ref 5–15)
AST SERPL-CCNC: 41 U/L (ref 15–37)
BASOPHILS # BLD: 0 K/UL (ref 0–0.1)
BASOPHILS NFR BLD: 0 % (ref 0–1)
BILIRUB SERPL-MCNC: 2.2 MG/DL (ref 0.2–1)
BLASTS NFR BLD MANUAL: 0 %
BUN SERPL-MCNC: 5 MG/DL (ref 6–20)
BUN/CREAT SERPL: 7 (ref 12–20)
CALCIUM SERPL-MCNC: 8.3 MG/DL (ref 8.5–10.1)
CHLORIDE SERPL-SCNC: 105 MMOL/L (ref 97–108)
CO2 SERPL-SCNC: 25 MMOL/L (ref 21–32)
CREAT SERPL-MCNC: 0.76 MG/DL (ref 0.55–1.02)
EOSINOPHIL # BLD: 0.6 K/UL (ref 0–0.4)
EOSINOPHIL NFR BLD: 5 % (ref 0–7)
ERYTHROCYTE [DISTWIDTH] IN BLOOD BY AUTOMATED COUNT: 19 % (ref 11.5–14.5)
GLOBULIN SER CALC-MCNC: 5 G/DL (ref 2–4)
GLUCOSE SERPL-MCNC: 109 MG/DL (ref 65–100)
HCT VFR BLD AUTO: 21.4 % (ref 35–47)
HGB BLD-MCNC: 7.3 G/DL (ref 11.5–16)
IMM GRANULOCYTES # BLD AUTO: 0 K/UL
IMM GRANULOCYTES NFR BLD AUTO: 0 %
LYMPHOCYTES # BLD: 4.9 K/UL (ref 0.8–3.5)
LYMPHOCYTES NFR BLD: 43 % (ref 12–49)
MCH RBC QN AUTO: 32.7 PG (ref 26–34)
MCHC RBC AUTO-ENTMCNC: 34.1 G/DL (ref 30–36.5)
MCV RBC AUTO: 96 FL (ref 80–99)
METAMYELOCYTES NFR BLD MANUAL: 0 %
MONOCYTES # BLD: 1.1 K/UL (ref 0–1)
MONOCYTES NFR BLD: 10 % (ref 5–13)
MYELOCYTES NFR BLD MANUAL: 0 %
NEUTS BAND NFR BLD MANUAL: 0 % (ref 0–6)
NEUTS SEG # BLD: 4.8 K/UL (ref 1.8–8)
NEUTS SEG NFR BLD: 42 % (ref 32–75)
NRBC # BLD: 0.41 K/UL (ref 0–0.01)
NRBC BLD-RTO: 3.6 PER 100 WBC
OTHER CELLS NFR BLD MANUAL: 0 %
PLATELET # BLD AUTO: 179 K/UL (ref 150–400)
PMV BLD AUTO: 11.2 FL (ref 8.9–12.9)
POTASSIUM SERPL-SCNC: 4.3 MMOL/L (ref 3.5–5.1)
PROMYELOCYTES NFR BLD MANUAL: 0 %
PROT SERPL-MCNC: 8.5 G/DL (ref 6.4–8.2)
RBC # BLD AUTO: 2.23 M/UL (ref 3.8–5.2)
RBC MORPH BLD: ABNORMAL
SODIUM SERPL-SCNC: 136 MMOL/L (ref 136–145)
WBC # BLD AUTO: 11.4 K/UL (ref 3.6–11)

## 2019-05-31 PROCEDURE — 74011000258 HC RX REV CODE- 258: Performed by: INTERNAL MEDICINE

## 2019-05-31 PROCEDURE — 74011250636 HC RX REV CODE- 250/636: Performed by: INTERNAL MEDICINE

## 2019-05-31 PROCEDURE — 36415 COLL VENOUS BLD VENIPUNCTURE: CPT

## 2019-05-31 PROCEDURE — 65660000000 HC RM CCU STEPDOWN

## 2019-05-31 PROCEDURE — 85027 COMPLETE CBC AUTOMATED: CPT

## 2019-05-31 PROCEDURE — 80053 COMPREHEN METABOLIC PANEL: CPT

## 2019-05-31 PROCEDURE — 74011250636 HC RX REV CODE- 250/636: Performed by: HOSPITALIST

## 2019-05-31 PROCEDURE — 74011250637 HC RX REV CODE- 250/637: Performed by: HOSPITALIST

## 2019-05-31 RX ADMIN — DEXTROSE MONOHYDRATE, SODIUM CHLORIDE, AND POTASSIUM CHLORIDE 100 ML/HR: 50; 4.5; .745 INJECTION, SOLUTION INTRAVENOUS at 14:56

## 2019-05-31 RX ADMIN — DIPHENHYDRAMINE HYDROCHLORIDE 50 MG: 50 INJECTION, SOLUTION INTRAMUSCULAR; INTRAVENOUS at 04:42

## 2019-05-31 RX ADMIN — DIPHENHYDRAMINE HYDROCHLORIDE 50 MG: 50 INJECTION, SOLUTION INTRAMUSCULAR; INTRAVENOUS at 17:26

## 2019-05-31 RX ADMIN — ONDANSETRON 4 MG: 2 INJECTION INTRAMUSCULAR; INTRAVENOUS at 21:47

## 2019-05-31 RX ADMIN — HYDROMORPHONE HYDROCHLORIDE 1 MG: 1 INJECTION, SOLUTION INTRAMUSCULAR; INTRAVENOUS; SUBCUTANEOUS at 17:26

## 2019-05-31 RX ADMIN — Medication 10 ML: at 21:49

## 2019-05-31 RX ADMIN — DEXTROSE MONOHYDRATE, SODIUM CHLORIDE, AND POTASSIUM CHLORIDE 100 ML/HR: 50; 4.5; .745 INJECTION, SOLUTION INTRAVENOUS at 05:01

## 2019-05-31 RX ADMIN — POTASSIUM CHLORIDE: 2 INJECTION, SOLUTION, CONCENTRATE INTRAVENOUS at 17:29

## 2019-05-31 RX ADMIN — CITALOPRAM 10 MG: 20 TABLET ORAL at 21:49

## 2019-05-31 RX ADMIN — HYDROMORPHONE HYDROCHLORIDE 1 MG: 1 INJECTION, SOLUTION INTRAMUSCULAR; INTRAVENOUS; SUBCUTANEOUS at 23:28

## 2019-05-31 RX ADMIN — Medication 10 ML: at 05:01

## 2019-05-31 RX ADMIN — HYDROMORPHONE HYDROCHLORIDE 1 MG: 1 INJECTION, SOLUTION INTRAMUSCULAR; INTRAVENOUS; SUBCUTANEOUS at 01:44

## 2019-05-31 RX ADMIN — ONDANSETRON 4 MG: 2 INJECTION INTRAMUSCULAR; INTRAVENOUS at 09:40

## 2019-05-31 RX ADMIN — ENOXAPARIN SODIUM 40 MG: 40 INJECTION SUBCUTANEOUS at 21:46

## 2019-05-31 RX ADMIN — LOSARTAN POTASSIUM 50 MG: 50 TABLET ORAL at 21:50

## 2019-05-31 RX ADMIN — HYDROMORPHONE HYDROCHLORIDE 1 MG: 1 INJECTION, SOLUTION INTRAMUSCULAR; INTRAVENOUS; SUBCUTANEOUS at 13:15

## 2019-05-31 RX ADMIN — FOLIC ACID 1 MG: 1 TABLET ORAL at 21:50

## 2019-05-31 RX ADMIN — HYDROMORPHONE HYDROCHLORIDE 1 MG: 1 INJECTION, SOLUTION INTRAMUSCULAR; INTRAVENOUS; SUBCUTANEOUS at 20:26

## 2019-05-31 RX ADMIN — DOCUSATE SODIUM 100 MG: 100 CAPSULE, LIQUID FILLED ORAL at 17:26

## 2019-05-31 RX ADMIN — HYDROMORPHONE HYDROCHLORIDE 1 MG: 1 INJECTION, SOLUTION INTRAMUSCULAR; INTRAVENOUS; SUBCUTANEOUS at 09:40

## 2019-05-31 RX ADMIN — DOCUSATE SODIUM 100 MG: 100 CAPSULE, LIQUID FILLED ORAL at 09:42

## 2019-05-31 RX ADMIN — DIPHENHYDRAMINE HYDROCHLORIDE 50 MG: 50 INJECTION, SOLUTION INTRAMUSCULAR; INTRAVENOUS at 09:40

## 2019-05-31 RX ADMIN — Medication 10 ML: at 15:49

## 2019-05-31 RX ADMIN — DIPHENHYDRAMINE HYDROCHLORIDE 50 MG: 50 INJECTION, SOLUTION INTRAMUSCULAR; INTRAVENOUS at 13:16

## 2019-05-31 RX ADMIN — DIPHENHYDRAMINE HYDROCHLORIDE 50 MG: 50 INJECTION, SOLUTION INTRAMUSCULAR; INTRAVENOUS at 21:47

## 2019-05-31 RX ADMIN — ONDANSETRON 4 MG: 2 INJECTION INTRAMUSCULAR; INTRAVENOUS at 01:44

## 2019-05-31 RX ADMIN — ONDANSETRON 4 MG: 2 INJECTION INTRAMUSCULAR; INTRAVENOUS at 15:49

## 2019-05-31 RX ADMIN — PANTOPRAZOLE SODIUM 40 MG: 40 TABLET, DELAYED RELEASE ORAL at 06:16

## 2019-05-31 RX ADMIN — HYDROMORPHONE HYDROCHLORIDE 1 MG: 1 INJECTION, SOLUTION INTRAMUSCULAR; INTRAVENOUS; SUBCUTANEOUS at 04:42

## 2019-05-31 NOTE — PROGRESS NOTES
Bedside shift change report given to [address] [place] [address] (oncoming nurse) by Luanne Lafleur RN (offgoing nurse). Report included the following information SBAR, ED Summary, Procedure Summary, Intake/Output, MAR, Recent Results, Cardiac Rhythm NSR, Alarm Parameters  and Quality Measures.

## 2019-05-31 NOTE — PROGRESS NOTES
General Daily Progress Note    Admit Date: 5/26/2019    Subjective:     Patient has no complaint . Current Facility-Administered Medications   Medication Dose Route Frequency    lactulose (CHRONULAC) 10 gram/15 mL solution 30 mL  20 g Oral PRN    fentaNYL (DURAGESIC) 75 mcg/hr patch 1 Patch  1 Patch TransDERmal Q72H    diphenhydrAMINE (BENADRYL) injection 50 mg  50 mg IntraVENous Q4H PRN    acetaminophen (TYLENOL) tablet 650 mg  650 mg Oral Q6H PRN    dextrose 5% - 0.45% NaCl with KCl 10 mEq/L infusion  100 mL/hr IntraVENous CONTINUOUS    sodium chloride (NS) flush 5-40 mL  5-40 mL IntraVENous Q8H    sodium chloride (NS) flush 5-40 mL  5-40 mL IntraVENous PRN    ondansetron (ZOFRAN) injection 4 mg  4 mg IntraVENous Q6H PRN    docusate sodium (COLACE) capsule 100 mg  100 mg Oral BID    enoxaparin (LOVENOX) injection 40 mg  40 mg SubCUTAneous Q24H    HYDROmorphone (PF) (DILAUDID) injection 1 mg  1 mg IntraVENous Q3H PRN    citalopram (CELEXA) tablet 10 mg  10 mg Oral QHS    folic acid (FOLVITE) tablet 1 mg  1 mg Oral QHS    pantoprazole (PROTONIX) tablet 40 mg  40 mg Oral ACB    losartan (COZAAR) tablet 50 mg  50 mg Oral QHS        Review of Systems  A comprehensive review of systems was negative. Objective:     Patient Vitals for the past 24 hrs:   BP Temp Pulse Resp SpO2   05/30/19 2254 143/73 99.3 °F (37.4 °C) 97 17 95 %   05/30/19 1933 148/78 99.2 °F (37.3 °C) 87 18 94 %   05/30/19 1506 148/89 99 °F (37.2 °C) 91 18 100 %   05/30/19 1148 150/79 99 °F (37.2 °C) 95 17 95 %   05/30/19 0759 144/82 99.3 °F (37.4 °C) 90 18 94 %   05/30/19 0404 121/72 98.6 °F (37 °C) 94 18 96 %   05/30/19 0013 116/63 98.8 °F (37.1 °C) 93 18 95 %     05/30 1901 - 05/31 0700  In: 598.3 [P.O.:240; I.V.:358.3]  Out: -   05/29 0701 - 05/30 1900  In: 4911.7 [P.O.:1220;  I.V.:3691.7]  Out: -     Physical Exam:   Visit Vitals  /73 (BP 1 Location: Left arm, BP Patient Position: At rest)   Pulse 97   Temp 99.3 °F (37.4 °C) Resp 17   Ht 5' 10\" (1.778 m)   Wt 194 lb 0.1 oz (88 kg)   SpO2 95%   BMI 27.84 kg/m²     General appearance: alert, cooperative, no distress, appears stated age  Neck: supple, symmetrical, trachea midline, no adenopathy, thyroid: not enlarged, symmetric, no tenderness/mass/nodules, no carotid bruit and no JVD  Lungs: clear to auscultation bilaterally  Heart: regular rate and rhythm, S1, S2 normal, no murmur, click, rub or gallop  Abdomen: soft, non-tender. Bowel sounds normal. No masses,  no organomegaly  Extremities: extremities normal, atraumatic, no cyanosis or edema        Data Review   Recent Results (from the past 24 hour(s))   GLUCOSE, POC    Collection Time: 05/30/19  8:04 AM   Result Value Ref Range    Glucose (POC) 110 (H) 65 - 100 mg/dL    Performed by Burak Szymanski (PCT)            Assessment:     Active Problems:    Sickle cell pain crisis ([facility] [address] (9/8/2018)        Plan:     1. Continue treatment of painful crises. Assess labs in a.m.. 2.  Will mobilize.

## 2019-05-31 NOTE — ROUTINE PROCESS
Bedside shift change report given to [facility] (oncoming nurse) by Balbina López (offgoing nurse). Report included the following information SBAR, Kardex, ED Summary, Intake/Output and MAR. Pt complained of pain several times during shift which was managed with PRN meds as ordered. Tolerated meals with no reports of nausea or vomiting. Pt in chair position in the bed most of the shift.  I/O documented

## 2019-05-31 NOTE — PROGRESS NOTES
Initial Nutrition Assessment:    INTERVENTIONS/RECOMMENDATIONS:   · Continue current diet     ASSESSMENT:   Chart reviewed, medically noted for sickle cell crisis and PMH shown below. Assessing pt due to LOS, pt well know to RD from past admissions. Flowsheet documents pt has a good appetite and consuming % of meals. Weight history shows no significant weight loss PTA. Will monitor PO intake and need for nutrition intervention.      Past Medical History:   Diagnosis Date    Anemia     SC hemoglobinopathy    Chronic pain     Depression     Hypertension     Liver disease     hepatitis C; pt reports \"cured\"    Sickle cell disease ([facility] [address]        Diet Order: Regular  % Eaten:    Patient Vitals for the past 72 hrs:   % Diet Eaten   05/31/19 0928 100 %   05/31/19 0851 100 %   05/30/19 1510 100 %   05/30/19 0759 75 %   05/29/19 1437 75 %     Pertinent Medications: [x]Reviewed: D5 1/2 NS, colace, zofran, PPI,   Pertinent Labs: [x]Reviewed:   Food Allergies: [x]NKFA  []Other   Last BM: 5/24  Edema:   n/a     []RUE   []LUE   []RLE   []LLE      Pressure Injury:  n/a    [] Stage I   [] Stage II   [] Stage III   [] Stage IV      Wt Readings from Last 30 Encounters:   05/26/19 88 kg (194 lb 0.1 oz)   05/25/19 88 kg (194 lb 0.1 oz)   05/09/19 87.6 kg (193 lb 3.2 oz)   04/14/19 86.1 kg (189 lb 13.1 oz)   04/04/19 88.5 kg (195 lb 3.2 oz)   03/07/19 89.4 kg (197 lb 3.2 oz)   02/16/19 80.6 kg (177 lb 11.1 oz)   02/07/19 82.2 kg (181 lb 4.8 oz)   01/19/19 79.8 kg (175 lb 14.8 oz)   01/10/19 82.9 kg (182 lb 12.8 oz)   12/12/18 77 kg (169 lb 12.1 oz)   11/13/18 89.4 kg (197 lb 1.6 oz)   10/29/18 77.4 kg (170 lb 10.2 oz)   10/02/18 91.5 kg (201 lb 12.8 oz)   09/24/18 86.3 kg (190 lb 3.2 oz)   09/07/18 83.9 kg (185 lb)   07/22/18 85.7 kg (188 lb 15 oz)   07/09/18 86.6 kg (191 lb)   06/12/18 88.7 kg (195 lb 8 oz)   04/09/18 86 kg (189 lb 8 oz)   03/13/18 84.9 kg (187 lb 3.2 oz)   01/29/18 87.2 kg (192 lb 4.8 oz)   01/05/18 86.4 kg (190 lb 7.6 oz)   01/04/18 86 kg (189 lb 9.5 oz)   11/28/17 87.7 kg (193 lb 4.8 oz)   11/14/17 89.9 kg (198 lb 4.8 oz)   10/31/17 87.7 kg (193 lb 6.4 oz)   07/31/17 88 kg (194 lb)   06/13/17 87.6 kg (193 lb 1.6 oz)   05/22/17 86.1 kg (189 lb 13.1 oz)       Anthropometrics:   Height: 5' 10\" (177.8 cm) Weight: 88 kg (194 lb 0.1 oz)   IBW (%IBW):   ( ) UBW (%UBW):   (  %)   Last Weight Metrics:  Weight Loss Metrics 5/26/2019 5/25/2019 5/9/2019 4/14/2019 4/4/2019 3/7/2019 2/16/2019   Today's Wt 194 lb 0.1 oz 194 lb 0.1 oz 193 lb 3.2 oz 189 lb 13.1 oz 195 lb 3.2 oz 197 lb 3.2 oz 177 lb 11.1 oz   BMI 27.84 kg/m2 27.84 kg/m2 26.95 kg/m2 26.47 kg/m2 28.01 kg/m2 28.3 kg/m2 25.5 kg/m2       BMI: Body mass index is 27.84 kg/m². This BMI is indicative of:   []Underweight    []Normal    [x]Overweight    [] Obesity   [] Extreme Obesity (BMI>40)     Estimated Nutrition Needs (Based on):   1875 Kcals/day(BMR: 1550 x 1.2) , 70 g(0.8 g/kg) Protein  Carbohydrate: At Least 130 g/day  Fluids: 1875 mL/day (1ml/kcal) or per primary team    NUTRITION DIAGNOSES:   Problem:  No nutritional diagnosis at this time      Etiology: related to       Signs/Symptoms: as evidenced by        NUTRITION INTERVENTIONS:  Meals/Snacks: General/healthful diet                  GOAL:   consume >75% of meals in 5-7 days    LEARNING NEEDS (Diet, Food/Nutrient-Drug Interaction):    [x] None Identified   [] Identified and Education Provided/Documented   [] Identified and Pt declined/was not appropriate     Cultureal, Hinduism, OR Ethnic Dietary Needs:    [x] None Identified   [] Identified and Addressed     [x] Interdisciplinary Care Plan Reviewed/Documented    [x] Discharge Planning:  General healthy diet      MONITORING /EVALUATION:      Food/Nutrient Intake Outcomes:  Total energy intake  Physical Signs/Symptoms Outcomes: Weight/weight change, GI    NUTRITION RISK:    [] High              [] Moderate           []  Low  [x]  Minimal/Uncompromised    PT SEEN FOR:    []  MD Consult: []Calorie Count      []Diabetic Diet Education        []Diet Education     []Electrolyte Management     []General Nutrition Management and Supplements     []Management of Tube Feeding     []TPN Recommendations    []  RN Referral:  []MST score >=2     []Enteral/Parenteral Nutrition PTA     []Pregnant: Gestational DM or Multigestation     []Pressure Ulcer/Wound Care needs        []  Low BMI  [x]  LOS Referral       Tate Potts, RDN  Pager [phone]  Weekend Pager [phone]

## 2019-06-01 PROCEDURE — 74011250636 HC RX REV CODE- 250/636: Performed by: HOSPITALIST

## 2019-06-01 PROCEDURE — 74011000258 HC RX REV CODE- 258: Performed by: INTERNAL MEDICINE

## 2019-06-01 PROCEDURE — 94760 N-INVAS EAR/PLS OXIMETRY 1: CPT

## 2019-06-01 PROCEDURE — 74011250636 HC RX REV CODE- 250/636: Performed by: INTERNAL MEDICINE

## 2019-06-01 PROCEDURE — 74011250637 HC RX REV CODE- 250/637: Performed by: EMERGENCY MEDICINE

## 2019-06-01 PROCEDURE — 65660000000 HC RM CCU STEPDOWN

## 2019-06-01 PROCEDURE — 74011250637 HC RX REV CODE- 250/637: Performed by: HOSPITALIST

## 2019-06-01 PROCEDURE — 74011250637 HC RX REV CODE- 250/637: Performed by: INTERNAL MEDICINE

## 2019-06-01 RX ADMIN — HYDROMORPHONE HYDROCHLORIDE 1 MG: 1 INJECTION, SOLUTION INTRAMUSCULAR; INTRAVENOUS; SUBCUTANEOUS at 11:52

## 2019-06-01 RX ADMIN — CITALOPRAM 10 MG: 20 TABLET ORAL at 22:29

## 2019-06-01 RX ADMIN — HYDROMORPHONE HYDROCHLORIDE 1 MG: 1 INJECTION, SOLUTION INTRAMUSCULAR; INTRAVENOUS; SUBCUTANEOUS at 22:28

## 2019-06-01 RX ADMIN — ONDANSETRON 4 MG: 2 INJECTION INTRAMUSCULAR; INTRAVENOUS at 08:38

## 2019-06-01 RX ADMIN — HYDROMORPHONE HYDROCHLORIDE 1 MG: 1 INJECTION, SOLUTION INTRAMUSCULAR; INTRAVENOUS; SUBCUTANEOUS at 15:02

## 2019-06-01 RX ADMIN — LACTULOSE 30 ML: 10 SOLUTION ORAL at 22:30

## 2019-06-01 RX ADMIN — DIPHENHYDRAMINE HYDROCHLORIDE 50 MG: 50 INJECTION, SOLUTION INTRAMUSCULAR; INTRAVENOUS at 16:11

## 2019-06-01 RX ADMIN — POTASSIUM CHLORIDE: 2 INJECTION, SOLUTION, CONCENTRATE INTRAVENOUS at 03:36

## 2019-06-01 RX ADMIN — DIPHENHYDRAMINE HYDROCHLORIDE 50 MG: 50 INJECTION, SOLUTION INTRAMUSCULAR; INTRAVENOUS at 08:37

## 2019-06-01 RX ADMIN — DIPHENHYDRAMINE HYDROCHLORIDE 50 MG: 50 INJECTION, SOLUTION INTRAMUSCULAR; INTRAVENOUS at 05:00

## 2019-06-01 RX ADMIN — POTASSIUM CHLORIDE: 2 INJECTION, SOLUTION, CONCENTRATE INTRAVENOUS at 16:11

## 2019-06-01 RX ADMIN — DOCUSATE SODIUM 100 MG: 100 CAPSULE, LIQUID FILLED ORAL at 18:04

## 2019-06-01 RX ADMIN — LACTULOSE 30 ML: 10 SOLUTION ORAL at 08:36

## 2019-06-01 RX ADMIN — DIPHENHYDRAMINE HYDROCHLORIDE 50 MG: 50 INJECTION, SOLUTION INTRAMUSCULAR; INTRAVENOUS at 11:52

## 2019-06-01 RX ADMIN — DIPHENHYDRAMINE HYDROCHLORIDE 50 MG: 50 INJECTION, SOLUTION INTRAMUSCULAR; INTRAVENOUS at 22:28

## 2019-06-01 RX ADMIN — Medication 10 ML: at 05:02

## 2019-06-01 RX ADMIN — FOLIC ACID 1 MG: 1 TABLET ORAL at 22:30

## 2019-06-01 RX ADMIN — ENOXAPARIN SODIUM 40 MG: 40 INJECTION SUBCUTANEOUS at 22:29

## 2019-06-01 RX ADMIN — LOSARTAN POTASSIUM 50 MG: 50 TABLET ORAL at 22:30

## 2019-06-01 RX ADMIN — ONDANSETRON 4 MG: 2 INJECTION INTRAMUSCULAR; INTRAVENOUS at 03:45

## 2019-06-01 RX ADMIN — ACETAMINOPHEN 650 MG: 325 TABLET ORAL at 08:36

## 2019-06-01 RX ADMIN — DIPHENHYDRAMINE HYDROCHLORIDE 50 MG: 50 INJECTION, SOLUTION INTRAMUSCULAR; INTRAVENOUS at 01:59

## 2019-06-01 RX ADMIN — HYDROMORPHONE HYDROCHLORIDE 1 MG: 1 INJECTION, SOLUTION INTRAMUSCULAR; INTRAVENOUS; SUBCUTANEOUS at 08:37

## 2019-06-01 RX ADMIN — Medication 10 ML: at 16:11

## 2019-06-01 RX ADMIN — Medication 10 ML: at 10:14

## 2019-06-01 RX ADMIN — PANTOPRAZOLE SODIUM 40 MG: 40 TABLET, DELAYED RELEASE ORAL at 05:26

## 2019-06-01 RX ADMIN — ONDANSETRON 4 MG: 2 INJECTION INTRAMUSCULAR; INTRAVENOUS at 22:28

## 2019-06-01 RX ADMIN — HYDROMORPHONE HYDROCHLORIDE 1 MG: 1 INJECTION, SOLUTION INTRAMUSCULAR; INTRAVENOUS; SUBCUTANEOUS at 18:04

## 2019-06-01 RX ADMIN — ONDANSETRON 4 MG: 2 INJECTION INTRAMUSCULAR; INTRAVENOUS at 15:02

## 2019-06-01 RX ADMIN — Medication 10 ML: at 22:29

## 2019-06-01 RX ADMIN — DOCUSATE SODIUM 100 MG: 100 CAPSULE, LIQUID FILLED ORAL at 08:36

## 2019-06-01 RX ADMIN — HYDROMORPHONE HYDROCHLORIDE 1 MG: 1 INJECTION, SOLUTION INTRAMUSCULAR; INTRAVENOUS; SUBCUTANEOUS at 05:00

## 2019-06-01 RX ADMIN — HYDROMORPHONE HYDROCHLORIDE 1 MG: 1 INJECTION, SOLUTION INTRAMUSCULAR; INTRAVENOUS; SUBCUTANEOUS at 02:02

## 2019-06-01 NOTE — PROGRESS NOTES
Patient Active Problem List   Diagnosis Code    Hypertension I10    Venous insufficiency I87.2    Depression F32.9    Sickle cell crisis (HCC) D57.00    Pulsatile tinnitus H93. A9    Carpal tunnel syndrome of right wrist G56.01    Sickle cell pain crisis ([facility] [address] D57.00    Overweight (BMI 25.0-29. 9) E66.3    Sickle cell disease ([facility]) D57.1     Allergies   Allergen Reactions    Dilaudid [Hydromorphone (Bulk)] Itching     Can tolerate with benadryl and ondansetron    Percocet [Oxycodone-Acetaminophen] Itching       Current Facility-Administered Medications:     potassium chloride 10 mEq in dextrose 5 % - 0.45% NaCl 1,000 mL IVPB, , IntraVENous, CONTINUOUS, Marine Bar MD, Last Rate: 100 mL/hr at 06/01/19 0336    lactulose (CHRONULAC) 10 gram/15 mL solution 30 mL, 20 g, Oral, PRN, Marine Bar MD, 30 mL at 06/01/19 0836    fentaNYL (DURAGESIC) 75 mcg/hr patch 1 Patch, 1 Patch, TransDERmal, Q72H, Marine Bar MD, 1 Patch at 06/01/19 0513    diphenhydrAMINE (BENADRYL) injection 50 mg, 50 mg, IntraVENous, Q4H PRN, Marine Bar MD, 50 mg at 06/01/19 0837    acetaminophen (TYLENOL) tablet 650 mg, 650 mg, Oral, Q6H PRN, Lloyd Ng DO, 650 mg at 06/01/19 0836    sodium chloride (NS) flush 5-40 mL, 5-40 mL, IntraVENous, Q8H, Juan Luis Faustin MD, 10 mL at 06/01/19 0502    sodium chloride (NS) flush 5-40 mL, 5-40 mL, IntraVENous, PRN, Juan Luis Faustin MD, 10 mL at 05/27/19 1434    ondansetron (ZOFRAN) injection 4 mg, 4 mg, IntraVENous, Q6H PRN, Juan Luis Faustin MD, 4 mg at 06/01/19 6938    docusate sodium (COLACE) capsule 100 mg, 100 mg, Oral, BID, Juan Luis Faustin MD, 100 mg at 06/01/19 0836    enoxaparin (LOVENOX) injection 40 mg, 40 mg, SubCUTAneous, Q24H, Juan Luis Faustin MD, 40 mg at 05/31/19 2146    HYDROmorphone (PF) (DILAUDID) injection 1 mg, 1 mg, IntraVENous, Q3H PRN, Juan Luis Faustin MD, 1 mg at 06/01/19 0837    citalopram (CELEXA) tablet 10 mg, 10 mg, Oral, QHS, Juan Luis Faustin MD, 10 mg at 94/71/63 3314    folic acid (FOLVITE) tablet 1 mg, 1 mg, Oral, QHS, Giovany Torres MD, 1 mg at 05/31/19 2150    pantoprazole (PROTONIX) tablet 40 mg, 40 mg, Oral, ACB, Giovany Torres MD, 40 mg at 06/01/19 0526    losartan (COZAAR) tablet 50 mg, 50 mg, Oral, QHS, Giovany Torres MD, 50 mg at 05/31/19 2150  No results found for this or any previous visit (from the past 12 hour(s)).   Visit Vitals  BP (!) 149/101 (BP 1 Location: Left arm, BP Patient Position: Sitting)   Pulse 88   Temp 99.3 °F (37.4 °C)   Resp 18   Ht 5' 10\" (1.778 m)   Wt 88 kg (194 lb 0.1 oz)   SpO2 95%   BMI 27.84 kg/m²     Exam:  Chest clear  Heart RRR  Abdomen good BS  Extremities no edema    Impression:  Sickle crisis  Elevated BP      Plan:  Pain management

## 2019-06-01 NOTE — PROGRESS NOTES
General Daily Progress Note    Admit Date: 5/26/2019    Subjective:     Patient continues to complain of pain Saurabh marino. Current Facility-Administered Medications   Medication Dose Route Frequency    potassium chloride 10 mEq in dextrose 5 % - 0.45% NaCl 1,000 mL IVPB   IntraVENous CONTINUOUS    lactulose (CHRONULAC) 10 gram/15 mL solution 30 mL  20 g Oral PRN    fentaNYL (DURAGESIC) 75 mcg/hr patch 1 Patch  1 Patch TransDERmal Q72H    diphenhydrAMINE (BENADRYL) injection 50 mg  50 mg IntraVENous Q4H PRN    acetaminophen (TYLENOL) tablet 650 mg  650 mg Oral Q6H PRN    sodium chloride (NS) flush 5-40 mL  5-40 mL IntraVENous Q8H    sodium chloride (NS) flush 5-40 mL  5-40 mL IntraVENous PRN    ondansetron (ZOFRAN) injection 4 mg  4 mg IntraVENous Q6H PRN    docusate sodium (COLACE) capsule 100 mg  100 mg Oral BID    enoxaparin (LOVENOX) injection 40 mg  40 mg SubCUTAneous Q24H    HYDROmorphone (PF) (DILAUDID) injection 1 mg  1 mg IntraVENous Q3H PRN    citalopram (CELEXA) tablet 10 mg  10 mg Oral QHS    folic acid (FOLVITE) tablet 1 mg  1 mg Oral QHS    pantoprazole (PROTONIX) tablet 40 mg  40 mg Oral ACB    losartan (COZAAR) tablet 50 mg  50 mg Oral QHS        Review of Systems  A comprehensive review of systems was negative.     Objective:     Patient Vitals for the past 24 hrs:   BP Temp Pulse Resp SpO2   05/31/19 1500 160/65 99.4 °F (37.4 °C) 95 16 96 %   05/31/19 1121 143/74 99.8 °F (37.7 °C) 91 16 97 %   05/31/19 0823 147/82 97.9 °F (36.6 °C) 98 16 93 %   05/31/19 0442 131/77 98.7 °F (37.1 °C) 95 18 99 %   05/30/19 2254 143/73 99.3 °F (37.4 °C) 97 17 95 %     05/31 1901 - 06/01 0700  In: 420 [P.O.:420]  Out: -   05/30 0701 - 05/31 1900  In: 4551.7 [P.O.:2060; I.V.:2491.7]  Out: 1000 [Urine:1000]    Physical Exam:   Visit Vitals  /81   Pulse 90   Temp 98.3 °F (36.8 °C)   Resp 18   Ht 5' 10\" (1.778 m)   Wt 194 lb 0.1 oz (88 kg)   SpO2 97%   BMI 27.84 kg/m²     General appearance: alert, cooperative, no distress, appears stated age  Neck: supple, symmetrical, trachea midline, no adenopathy, thyroid: not enlarged, symmetric, no tenderness/mass/nodules, no carotid bruit and no JVD  Lungs: clear to auscultation bilaterally  Heart: regular rate and rhythm, S1, S2 normal, no murmur, click, rub or gallop  Abdomen: soft, non-tender. Bowel sounds normal. No masses,  no organomegaly  Extremities: extremities normal, atraumatic, no cyanosis or edema        Data Review   Recent Results (from the past 24 hour(s))   METABOLIC PANEL, COMPREHENSIVE    Collection Time: 05/31/19  5:00 AM   Result Value Ref Range    Sodium 136 136 - 145 mmol/L    Potassium 4.3 3.5 - 5.1 mmol/L    Chloride 105 97 - 108 mmol/L    CO2 25 21 - 32 mmol/L    Anion gap 6 5 - 15 mmol/L    Glucose 109 (H) 65 - 100 mg/dL    BUN 5 (L) 6 - 20 MG/DL    Creatinine 0.76 0.55 - 1.02 MG/DL    BUN/Creatinine ratio 7 (L) 12 - 20      GFR est AA >60 >60 ml/min/1.73m2    GFR est non-AA >60 >60 ml/min/1.73m2    Calcium 8.3 (L) 8.5 - 10.1 MG/DL    Bilirubin, total 2.2 (H) 0.2 - 1.0 MG/DL    ALT (SGPT) 22 12 - 78 U/L    AST (SGOT) 41 (H) 15 - 37 U/L    Alk.  phosphatase 121 (H) 45 - 117 U/L    Protein, total 8.5 (H) 6.4 - 8.2 g/dL    Albumin 3.5 3.5 - 5.0 g/dL    Globulin 5.0 (H) 2.0 - 4.0 g/dL    A-G Ratio 0.7 (L) 1.1 - 2.2     CBC WITH MANUAL DIFF    Collection Time: 05/31/19  5:47 AM   Result Value Ref Range    WBC 11.4 (H) 3.6 - 11.0 K/uL    RBC 2.23 (L) 3.80 - 5.20 M/uL    HGB 7.3 (L) 11.5 - 16.0 g/dL    HCT 21.4 (L) 35.0 - 47.0 %    MCV 96.0 80.0 - 99.0 FL    MCH 32.7 26.0 - 34.0 PG    MCHC 34.1 30.0 - 36.5 g/dL    RDW 19.0 (H) 11.5 - 14.5 %    PLATELET 235 027 - 399 K/uL    MPV 11.2 8.9 - 12.9 FL    NRBC 3.6 (H) 0  WBC    ABSOLUTE NRBC 0.41 (H) 0.00 - 0.01 K/uL    NEUTROPHILS 42 32 - 75 %    BAND NEUTROPHILS 0 0 - 6 %    LYMPHOCYTES 43 12 - 49 %    MONOCYTES 10 5 - 13 %    EOSINOPHILS 5 0 - 7 %    BASOPHILS 0 0 - 1 %    METAMYELOCYTES 0 0 % MYELOCYTES 0 0 %    PROMYELOCYTES 0 0 %    BLASTS 0 0 %    OTHER CELL 0 0      IMMATURE GRANULOCYTES 0 %    ABS. NEUTROPHILS 4.8 1.8 - 8.0 K/UL    ABS. LYMPHOCYTES 4.9 (H) 0.8 - 3.5 K/UL    ABS. MONOCYTES 1.1 (H) 0.0 - 1.0 K/UL    ABS. EOSINOPHILS 0.6 (H) 0.0 - 0.4 K/UL    ABS. BASOPHILS 0.0 0.0 - 0.1 K/UL    ABS. IMM. GRANS. 0.0 K/UL    RBC COMMENTS ANISOCYTOSIS  2+        RBC COMMENTS MACROCYTOSIS  2+        RBC COMMENTS HYPOCHROMIA  1+        RBC COMMENTS TARGET CELLS  PRESENT        RBC COMMENTS BASOPHILIC STIPPLING  PRESENT        RBC COMMENTS SICKLE CELLS  PRESENT               Assessment:     Active Problems:    Sick[facility] crisis ([facility] (9/8/2018)        Plan:     1. Continue treatment of painful crises.

## 2019-06-02 PROCEDURE — 74011000258 HC RX REV CODE- 258: Performed by: INTERNAL MEDICINE

## 2019-06-02 PROCEDURE — 65660000000 HC RM CCU STEPDOWN

## 2019-06-02 PROCEDURE — 74011250636 HC RX REV CODE- 250/636: Performed by: HOSPITALIST

## 2019-06-02 PROCEDURE — 74011250637 HC RX REV CODE- 250/637: Performed by: HOSPITALIST

## 2019-06-02 PROCEDURE — 74011250636 HC RX REV CODE- 250/636: Performed by: INTERNAL MEDICINE

## 2019-06-02 RX ADMIN — Medication 10 ML: at 15:28

## 2019-06-02 RX ADMIN — Medication 10 ML: at 05:24

## 2019-06-02 RX ADMIN — ONDANSETRON 4 MG: 2 INJECTION INTRAMUSCULAR; INTRAVENOUS at 11:57

## 2019-06-02 RX ADMIN — ONDANSETRON 4 MG: 2 INJECTION INTRAMUSCULAR; INTRAVENOUS at 17:58

## 2019-06-02 RX ADMIN — PANTOPRAZOLE SODIUM 40 MG: 40 TABLET, DELAYED RELEASE ORAL at 06:20

## 2019-06-02 RX ADMIN — Medication 10 ML: at 21:01

## 2019-06-02 RX ADMIN — ENOXAPARIN SODIUM 40 MG: 40 INJECTION SUBCUTANEOUS at 21:02

## 2019-06-02 RX ADMIN — DIPHENHYDRAMINE HYDROCHLORIDE 50 MG: 50 INJECTION, SOLUTION INTRAMUSCULAR; INTRAVENOUS at 06:18

## 2019-06-02 RX ADMIN — HYDROMORPHONE HYDROCHLORIDE 1 MG: 1 INJECTION, SOLUTION INTRAMUSCULAR; INTRAVENOUS; SUBCUTANEOUS at 17:58

## 2019-06-02 RX ADMIN — HYDROMORPHONE HYDROCHLORIDE 1 MG: 1 INJECTION, SOLUTION INTRAMUSCULAR; INTRAVENOUS; SUBCUTANEOUS at 05:23

## 2019-06-02 RX ADMIN — HYDROMORPHONE HYDROCHLORIDE 1 MG: 1 INJECTION, SOLUTION INTRAMUSCULAR; INTRAVENOUS; SUBCUTANEOUS at 11:58

## 2019-06-02 RX ADMIN — FOLIC ACID 1 MG: 1 TABLET ORAL at 21:02

## 2019-06-02 RX ADMIN — HYDROMORPHONE HYDROCHLORIDE 1 MG: 1 INJECTION, SOLUTION INTRAMUSCULAR; INTRAVENOUS; SUBCUTANEOUS at 15:26

## 2019-06-02 RX ADMIN — HYDROMORPHONE HYDROCHLORIDE 1 MG: 1 INJECTION, SOLUTION INTRAMUSCULAR; INTRAVENOUS; SUBCUTANEOUS at 08:41

## 2019-06-02 RX ADMIN — DIPHENHYDRAMINE HYDROCHLORIDE 50 MG: 50 INJECTION, SOLUTION INTRAMUSCULAR; INTRAVENOUS at 11:57

## 2019-06-02 RX ADMIN — DIPHENHYDRAMINE HYDROCHLORIDE 50 MG: 50 INJECTION, SOLUTION INTRAMUSCULAR; INTRAVENOUS at 02:15

## 2019-06-02 RX ADMIN — HYDROMORPHONE HYDROCHLORIDE 1 MG: 1 INJECTION, SOLUTION INTRAMUSCULAR; INTRAVENOUS; SUBCUTANEOUS at 02:07

## 2019-06-02 RX ADMIN — DOCUSATE SODIUM 100 MG: 100 CAPSULE, LIQUID FILLED ORAL at 17:59

## 2019-06-02 RX ADMIN — LOSARTAN POTASSIUM 50 MG: 50 TABLET ORAL at 21:02

## 2019-06-02 RX ADMIN — ONDANSETRON 4 MG: 2 INJECTION INTRAMUSCULAR; INTRAVENOUS at 04:32

## 2019-06-02 RX ADMIN — Medication 10 ML: at 02:07

## 2019-06-02 RX ADMIN — POTASSIUM CHLORIDE: 2 INJECTION, SOLUTION, CONCENTRATE INTRAVENOUS at 17:43

## 2019-06-02 RX ADMIN — DIPHENHYDRAMINE HYDROCHLORIDE 50 MG: 50 INJECTION, SOLUTION INTRAMUSCULAR; INTRAVENOUS at 15:26

## 2019-06-02 RX ADMIN — CITALOPRAM 10 MG: 20 TABLET ORAL at 21:03

## 2019-06-02 RX ADMIN — DOCUSATE SODIUM 100 MG: 100 CAPSULE, LIQUID FILLED ORAL at 08:41

## 2019-06-02 RX ADMIN — DIPHENHYDRAMINE HYDROCHLORIDE 50 MG: 50 INJECTION, SOLUTION INTRAMUSCULAR; INTRAVENOUS at 19:55

## 2019-06-02 RX ADMIN — HYDROMORPHONE HYDROCHLORIDE 1 MG: 1 INJECTION, SOLUTION INTRAMUSCULAR; INTRAVENOUS; SUBCUTANEOUS at 20:59

## 2019-06-02 RX ADMIN — POTASSIUM CHLORIDE: 2 INJECTION, SOLUTION, CONCENTRATE INTRAVENOUS at 05:28

## 2019-06-02 NOTE — PROGRESS NOTES
General Surgery End of Shift Nursing Note    Bedside shift change report given to [facility] RN (oncoming nurse) by Stuart Rosa RN (offgoing nurse). Report included the following information SBAR, Kardex, Intake/Output, MAR and Cardiac Rhythm SR. Shift worked:   7p-7a   Summary of shift: Iv started by PICC nurse infiltrated, multiple venipuncture attempt with pt's permission done to restart IV  Pt tolerated procedure well, focused on receiving pain/nausea medicine   Issues for physician to address:   none     Number times ambulated in hallway past shift: 0    Number of times OOB to chair past shift: 0    Pain Management:  Current medication: Dilaudid 1mg IVP  Patient states pain is manageable on current pain medication: YES    GI:    Current diet:  DIET REGULAR    Tolerating current diet: YES  Passing flatus: YES  Last Bowel Movement: 5/24 prior to admission     Respiratory:    Incentive Spirometer at bedside: YES  Patient instructed on use: YES    Patient Safety:    Falls Score: 1  Bed Alarm On? No  Sitter?  No    Kj Kerr RN

## 2019-06-02 NOTE — ROUTINE PROCESS
Bedside and Verbal shift change report given to Angela Hidalgo (oncoming nurse) by Roger Aparicio (offgoing nurse). Report included the following information SBAR, Kardex, Intake/Output and MAR     Dilaudid, Zofran and Benedryl given several times during shift. Tylenol given once for low grade fever. Meals tolerated with no reports of nausea or vomiting. PICC team called twice for IV placement due to infiltration.

## 2019-06-02 NOTE — PROGRESS NOTES
10:25 PM Primary RN, Melissa Tyler, attempted x6 to start new IV on patient; All attempts unsuccessful; Rapid Response RNJenni, to come attempt.     Nikki Yan RN

## 2019-06-02 NOTE — PROGRESS NOTES
After multiple attempts to start IV with larger bore were unsuccessful, a # 24 was inserted in L forearm @ 4175. Pt gave permission to this nurse to continue to try because she did not want her pain to get too \"out of control\". Pt stated \"I'm used to this\"  Also stated she is not interested in ever having a portacath and understands that she will need a larger IV catheter if she ever needs a blood transfusion.

## 2019-06-02 NOTE — PROGRESS NOTES
Patient Active Problem List   Diagnosis Code    Hypertension I10    Venous insufficiency I87.2    Depression F32.9    Sickle cell crisis (HCC) D57.00    Pulsatile tinnitus H93. A9    Carpal tunnel syndrome of right wrist G56.01    Sickle cell pain crisis ([facility] [address] D57.00    Overweight (BMI 25.0-29. 9) E66.3    Sickle cell disease ([facility] [address] D57.1       Current Facility-Administered Medications:     potassium chloride 10 mEq in dextrose 5 % - 0.45% NaCl 1,000 mL IVPB, , IntraVENous, CONTINUOUS, Jerel Marx MD, Last Rate: 100 mL/hr at 06/02/19 0528    lactulose (CHRONULAC) 10 gram/15 mL solution 30 mL, 20 g, Oral, PRN, Jerel Marx MD, 30 mL at 06/01/19 2230    fentaNYL (DURAGESIC) 75 mcg/hr patch 1 Patch, 1 Patch, TransDERmal, Q72H, Jerel Marx MD, 1 Patch at 06/01/19 0513    diphenhydrAMINE (BENADRYL) injection 50 mg, 50 mg, IntraVENous, Q4H PRN, Jerel Marx MD, 50 mg at 06/02/19 0618    acetaminophen (TYLENOL) tablet 650 mg, 650 mg, Oral, Q6H PRN, Lorriane Ok, DO, 650 mg at 06/01/19 0836    sodium chloride (NS) flush 5-40 mL, 5-40 mL, IntraVENous, Q8H, Titus Colin MD, 10 mL at 06/02/19 0524    sodium chloride (NS) flush 5-40 mL, 5-40 mL, IntraVENous, PRN, Titus Colin MD, 10 mL at 06/02/19 0207    ondansetron (ZOFRAN) injection 4 mg, 4 mg, IntraVENous, Q6H PRN, Titus Colin MD, 4 mg at 06/02/19 0432    docusate sodium (COLACE) capsule 100 mg, 100 mg, Oral, BID, Titus Cloin MD, 100 mg at 06/02/19 0841    enoxaparin (LOVENOX) injection 40 mg, 40 mg, SubCUTAneous, Q24H, Titus Colin MD, 40 mg at 06/01/19 2229    HYDROmorphone (PF) (DILAUDID) injection 1 mg, 1 mg, IntraVENous, Q3H PRN, Titus Colin MD, 1 mg at 06/02/19 0841    citalopram (CELEXA) tablet 10 mg, 10 mg, Oral, QHS, Titus Colin MD, 10 mg at 62/60/13 7326    folic acid (FOLVITE) tablet 1 mg, 1 mg, Oral, QHS, Titus Colin MD, 1 mg at 06/01/19 2230    pantoprazole (PROTONIX) tablet 40 mg, 40 mg, Oral, Cheryl Rojas MD, 40 mg at 06/02/19 5704    losartan (COZAAR) tablet 50 mg, 50 mg, Oral, QHS, Mary Casanova MD, 50 mg at 06/01/19 2230  Visit Vitals  /65 (BP 1 Location: Left arm, BP Patient Position: At rest)   Pulse 88   Temp 98.5 °F (36.9 °C)   Resp 16   Ht 5' 10\" (1.778 m)   Wt 88 kg (194 lb 0.1 oz)   SpO2 91%   BMI 27.84 kg/m²     Exam:  Chest clear  Heart RRR  Abdomen good BS  Extremities no edema    Impression:  Sickle crisis  Elevated BP

## 2019-06-03 PROCEDURE — 74011250637 HC RX REV CODE- 250/637: Performed by: HOSPITALIST

## 2019-06-03 PROCEDURE — 94760 N-INVAS EAR/PLS OXIMETRY 1: CPT

## 2019-06-03 PROCEDURE — 74011000258 HC RX REV CODE- 258: Performed by: INTERNAL MEDICINE

## 2019-06-03 PROCEDURE — 65660000000 HC RM CCU STEPDOWN

## 2019-06-03 PROCEDURE — 74011250636 HC RX REV CODE- 250/636: Performed by: INTERNAL MEDICINE

## 2019-06-03 PROCEDURE — 74011250636 HC RX REV CODE- 250/636: Performed by: HOSPITALIST

## 2019-06-03 RX ORDER — DEXTROSE, SODIUM CHLORIDE, AND POTASSIUM CHLORIDE 5; .45; .075 G/100ML; G/100ML; G/100ML
75 INJECTION INTRAVENOUS CONTINUOUS
Status: DISCONTINUED | OUTPATIENT
Start: 2019-06-03 | End: 2019-06-06 | Stop reason: HOSPADM

## 2019-06-03 RX ORDER — POLYETHYLENE GLYCOL 3350 17 G/17G
17 POWDER, FOR SOLUTION ORAL DAILY
Status: DISCONTINUED | OUTPATIENT
Start: 2019-06-04 | End: 2019-06-06 | Stop reason: HOSPADM

## 2019-06-03 RX ADMIN — Medication 10 ML: at 00:03

## 2019-06-03 RX ADMIN — CITALOPRAM 10 MG: 20 TABLET ORAL at 20:55

## 2019-06-03 RX ADMIN — LOSARTAN POTASSIUM 50 MG: 50 TABLET ORAL at 21:25

## 2019-06-03 RX ADMIN — DIPHENHYDRAMINE HYDROCHLORIDE 50 MG: 50 INJECTION, SOLUTION INTRAMUSCULAR; INTRAVENOUS at 21:46

## 2019-06-03 RX ADMIN — HYDROMORPHONE HYDROCHLORIDE 1 MG: 1 INJECTION, SOLUTION INTRAMUSCULAR; INTRAVENOUS; SUBCUTANEOUS at 00:02

## 2019-06-03 RX ADMIN — HYDROMORPHONE HYDROCHLORIDE 1 MG: 1 INJECTION, SOLUTION INTRAMUSCULAR; INTRAVENOUS; SUBCUTANEOUS at 03:06

## 2019-06-03 RX ADMIN — HYDROMORPHONE HYDROCHLORIDE 1 MG: 1 INJECTION, SOLUTION INTRAMUSCULAR; INTRAVENOUS; SUBCUTANEOUS at 14:47

## 2019-06-03 RX ADMIN — HYDROMORPHONE HYDROCHLORIDE 1 MG: 1 INJECTION, SOLUTION INTRAMUSCULAR; INTRAVENOUS; SUBCUTANEOUS at 06:06

## 2019-06-03 RX ADMIN — Medication 10 ML: at 13:11

## 2019-06-03 RX ADMIN — POTASSIUM CHLORIDE: 2 INJECTION, SOLUTION, CONCENTRATE INTRAVENOUS at 04:23

## 2019-06-03 RX ADMIN — DIPHENHYDRAMINE HYDROCHLORIDE 50 MG: 50 INJECTION, SOLUTION INTRAMUSCULAR; INTRAVENOUS at 08:49

## 2019-06-03 RX ADMIN — DIPHENHYDRAMINE HYDROCHLORIDE 50 MG: 50 INJECTION, SOLUTION INTRAMUSCULAR; INTRAVENOUS at 00:02

## 2019-06-03 RX ADMIN — DIPHENHYDRAMINE HYDROCHLORIDE 50 MG: 50 INJECTION, SOLUTION INTRAMUSCULAR; INTRAVENOUS at 04:17

## 2019-06-03 RX ADMIN — HYDROMORPHONE HYDROCHLORIDE 1 MG: 1 INJECTION, SOLUTION INTRAMUSCULAR; INTRAVENOUS; SUBCUTANEOUS at 20:55

## 2019-06-03 RX ADMIN — FOLIC ACID 1 MG: 1 TABLET ORAL at 21:25

## 2019-06-03 RX ADMIN — DEXTROSE MONOHYDRATE, SODIUM CHLORIDE, AND POTASSIUM CHLORIDE 100 ML/HR: 50; 4.5; .745 INJECTION, SOLUTION INTRAVENOUS at 09:21

## 2019-06-03 RX ADMIN — DOCUSATE SODIUM 100 MG: 100 CAPSULE, LIQUID FILLED ORAL at 08:46

## 2019-06-03 RX ADMIN — HYDROMORPHONE HYDROCHLORIDE 1 MG: 1 INJECTION, SOLUTION INTRAMUSCULAR; INTRAVENOUS; SUBCUTANEOUS at 17:46

## 2019-06-03 RX ADMIN — Medication 10 ML: at 21:00

## 2019-06-03 RX ADMIN — ONDANSETRON 4 MG: 2 INJECTION INTRAMUSCULAR; INTRAVENOUS at 06:06

## 2019-06-03 RX ADMIN — DEXTROSE MONOHYDRATE, SODIUM CHLORIDE, AND POTASSIUM CHLORIDE 100 ML/HR: 50; 4.5; .745 INJECTION, SOLUTION INTRAVENOUS at 18:49

## 2019-06-03 RX ADMIN — PANTOPRAZOLE SODIUM 40 MG: 40 TABLET, DELAYED RELEASE ORAL at 06:06

## 2019-06-03 RX ADMIN — HYDROMORPHONE HYDROCHLORIDE 1 MG: 1 INJECTION, SOLUTION INTRAMUSCULAR; INTRAVENOUS; SUBCUTANEOUS at 08:49

## 2019-06-03 RX ADMIN — DIPHENHYDRAMINE HYDROCHLORIDE 50 MG: 50 INJECTION, SOLUTION INTRAMUSCULAR; INTRAVENOUS at 13:10

## 2019-06-03 RX ADMIN — HYDROMORPHONE HYDROCHLORIDE 1 MG: 1 INJECTION, SOLUTION INTRAMUSCULAR; INTRAVENOUS; SUBCUTANEOUS at 11:48

## 2019-06-03 RX ADMIN — DOCUSATE SODIUM 100 MG: 100 CAPSULE, LIQUID FILLED ORAL at 17:46

## 2019-06-03 RX ADMIN — ONDANSETRON 4 MG: 2 INJECTION INTRAMUSCULAR; INTRAVENOUS at 00:02

## 2019-06-03 RX ADMIN — ONDANSETRON 4 MG: 2 INJECTION INTRAMUSCULAR; INTRAVENOUS at 17:46

## 2019-06-03 RX ADMIN — DIPHENHYDRAMINE HYDROCHLORIDE 50 MG: 50 INJECTION, SOLUTION INTRAMUSCULAR; INTRAVENOUS at 17:46

## 2019-06-03 RX ADMIN — ENOXAPARIN SODIUM 40 MG: 40 INJECTION SUBCUTANEOUS at 20:55

## 2019-06-03 RX ADMIN — ONDANSETRON 4 MG: 2 INJECTION INTRAMUSCULAR; INTRAVENOUS at 11:48

## 2019-06-03 RX ADMIN — Medication 10 ML: at 06:13

## 2019-06-03 NOTE — PROGRESS NOTES
General Surgery End of Shift Nursing Note    Bedside shift change report given to Louisa (oncoming nurse) by Mango Danielson (offgoing nurse). Report included the following information SBAR. Shift worked:   7a-7p   Summary of shift:    Progressing towards goals. Pain controlled with IV dilaudid q3h. Issues for physician to address:   none     Number times ambulated in hallway past shift: 0    Number of times OOB to chair past shift: 3    Pain Management:  Current medication: Dilaudid 1mg q3h  Patient states pain is manageable on current pain medication: YES    GI:    Current diet:  DIET REGULAR    Tolerating current diet: YES  Passing flatus: YES  Last Bowel Movement: several days ago       Respiratory:    Incentive Spirometer at bedside: YES  Patient instructed on use: YES    Patient Safety:    Falls Score: 1  Bed Alarm On? No  Sitter?  No    Tata Chew

## 2019-06-03 NOTE — ROUTINE PROCESS
Bedside and Verbal shift change report given to Vin Gutiérrez (oncoming nurse) by Lizabeth Hernandez (offgoing nurse). Report included the following information SBAR, Kardex, Intake/Output and MAR. Pt still hasn't had a bowel movement but states she doesn't feel constipated as she normally goes only once a week.

## 2019-06-03 NOTE — PROGRESS NOTES
General Surgery End of Shift Nursing Note    Bedside shift change report given to Supriya Rodriguez 71 (oncoming nurse) by Marry Corral RN (offgoing nurse). Report included the following information SBAR, Kardex, Intake/Output, MAR and Cardiac Rhythm SR. Shift worked:   7p-7a   Summary of shift:    Observed resting with eyes closed on most rounds, still awakens for pain med Q 3h, and requests antiemetic and antihistimine for side effects of narcotic   Issues for physician to address:   none     Number times ambulated in hallway past shift: 0    Number of times OOB to chair past shift: 0    Pain Management:  Current medication: Dilaudid 1mg Q 3h PRN  Patient states pain is manageable on current pain medication: YES    GI:    Current diet:  DIET REGULAR    Tolerating current diet: YES  Passing flatus: YES  Last Bowel Movement: more than a week ago       Respiratory:    Incentive Spirometer at bedside: YES  Patient instructed on use: YES    Patient Safety:    Falls Score:  1  Bed Alarm On? No  Sitter?  No    Jovanny Anne, RN

## 2019-06-04 LAB
ALBUMIN SERPL-MCNC: 3.4 G/DL (ref 3.5–5)
ALBUMIN/GLOB SERPL: 0.7 {RATIO} (ref 1.1–2.2)
ALP SERPL-CCNC: 130 U/L (ref 45–117)
ALT SERPL-CCNC: 24 U/L (ref 12–78)
ANION GAP SERPL CALC-SCNC: 4 MMOL/L (ref 5–15)
AST SERPL-CCNC: 40 U/L (ref 15–37)
BASOPHILS # BLD: 0.1 K/UL (ref 0–0.1)
BASOPHILS NFR BLD: 1 % (ref 0–1)
BILIRUB SERPL-MCNC: 2 MG/DL (ref 0.2–1)
BLASTS NFR BLD MANUAL: 0 %
BUN SERPL-MCNC: 6 MG/DL (ref 6–20)
BUN/CREAT SERPL: 8 (ref 12–20)
CALCIUM SERPL-MCNC: 8.2 MG/DL (ref 8.5–10.1)
CHLORIDE SERPL-SCNC: 102 MMOL/L (ref 97–108)
CO2 SERPL-SCNC: 30 MMOL/L (ref 21–32)
CREAT SERPL-MCNC: 0.76 MG/DL (ref 0.55–1.02)
DIFFERENTIAL METHOD BLD: ABNORMAL
EOSINOPHIL # BLD: 0.1 K/UL (ref 0–0.4)
EOSINOPHIL NFR BLD: 1 % (ref 0–7)
ERYTHROCYTE [DISTWIDTH] IN BLOOD BY AUTOMATED COUNT: 19.2 % (ref 11.5–14.5)
GLOBULIN SER CALC-MCNC: 4.9 G/DL (ref 2–4)
GLUCOSE SERPL-MCNC: 97 MG/DL (ref 65–100)
HCT VFR BLD AUTO: 22.5 % (ref 35–47)
HGB BLD-MCNC: 7.8 G/DL (ref 11.5–16)
IMM GRANULOCYTES # BLD AUTO: 0 K/UL
IMM GRANULOCYTES NFR BLD AUTO: 0 %
LYMPHOCYTES # BLD: 4.3 K/UL (ref 0.8–3.5)
LYMPHOCYTES NFR BLD: 40 % (ref 12–49)
MCH RBC QN AUTO: 33.9 PG (ref 26–34)
MCHC RBC AUTO-ENTMCNC: 34.7 G/DL (ref 30–36.5)
MCV RBC AUTO: 97.8 FL (ref 80–99)
METAMYELOCYTES NFR BLD MANUAL: 0 %
MONOCYTES # BLD: 1.4 K/UL (ref 0–1)
MONOCYTES NFR BLD: 13 % (ref 5–13)
MYELOCYTES NFR BLD MANUAL: 0 %
NEUTS BAND NFR BLD MANUAL: 0 % (ref 0–6)
NEUTS SEG # BLD: 4.8 K/UL (ref 1.8–8)
NEUTS SEG NFR BLD: 45 % (ref 32–75)
NRBC # BLD: 0.25 K/UL (ref 0–0.01)
NRBC BLD-RTO: 2.3 PER 100 WBC
OTHER CELLS NFR BLD MANUAL: 0 %
PLATELET # BLD AUTO: 216 K/UL (ref 150–400)
PMV BLD AUTO: 11.4 FL (ref 8.9–12.9)
POTASSIUM SERPL-SCNC: 4.1 MMOL/L (ref 3.5–5.1)
PROMYELOCYTES NFR BLD MANUAL: 0 %
PROT SERPL-MCNC: 8.3 G/DL (ref 6.4–8.2)
RBC # BLD AUTO: 2.3 M/UL (ref 3.8–5.2)
RBC MORPH BLD: ABNORMAL
SODIUM SERPL-SCNC: 136 MMOL/L (ref 136–145)
WBC # BLD AUTO: 10.7 K/UL (ref 3.6–11)

## 2019-06-04 PROCEDURE — 74011250637 HC RX REV CODE- 250/637: Performed by: INTERNAL MEDICINE

## 2019-06-04 PROCEDURE — 74011250636 HC RX REV CODE- 250/636: Performed by: INTERNAL MEDICINE

## 2019-06-04 PROCEDURE — 85027 COMPLETE CBC AUTOMATED: CPT

## 2019-06-04 PROCEDURE — 36415 COLL VENOUS BLD VENIPUNCTURE: CPT

## 2019-06-04 PROCEDURE — 74011250636 HC RX REV CODE- 250/636: Performed by: HOSPITALIST

## 2019-06-04 PROCEDURE — 65660000000 HC RM CCU STEPDOWN

## 2019-06-04 PROCEDURE — 80053 COMPREHEN METABOLIC PANEL: CPT

## 2019-06-04 PROCEDURE — 83021 HEMOGLOBIN CHROMOTOGRAPHY: CPT

## 2019-06-04 PROCEDURE — 74011250637 HC RX REV CODE- 250/637: Performed by: HOSPITALIST

## 2019-06-04 PROCEDURE — 94760 N-INVAS EAR/PLS OXIMETRY 1: CPT

## 2019-06-04 RX ORDER — HYDROMORPHONE HYDROCHLORIDE 1 MG/ML
1 INJECTION, SOLUTION INTRAMUSCULAR; INTRAVENOUS; SUBCUTANEOUS
Status: DISCONTINUED | OUTPATIENT
Start: 2019-06-04 | End: 2019-06-05

## 2019-06-04 RX ADMIN — DEXTROSE MONOHYDRATE, SODIUM CHLORIDE, AND POTASSIUM CHLORIDE 75 ML/HR: 50; 4.5; .745 INJECTION, SOLUTION INTRAVENOUS at 07:32

## 2019-06-04 RX ADMIN — DIPHENHYDRAMINE HYDROCHLORIDE 50 MG: 50 INJECTION, SOLUTION INTRAMUSCULAR; INTRAVENOUS at 20:06

## 2019-06-04 RX ADMIN — DIPHENHYDRAMINE HYDROCHLORIDE 50 MG: 50 INJECTION, SOLUTION INTRAMUSCULAR; INTRAVENOUS at 16:04

## 2019-06-04 RX ADMIN — DOCUSATE SODIUM 100 MG: 100 CAPSULE, LIQUID FILLED ORAL at 09:07

## 2019-06-04 RX ADMIN — HYDROMORPHONE HYDROCHLORIDE 1 MG: 1 INJECTION, SOLUTION INTRAMUSCULAR; INTRAVENOUS; SUBCUTANEOUS at 01:13

## 2019-06-04 RX ADMIN — PANTOPRAZOLE SODIUM 40 MG: 40 TABLET, DELAYED RELEASE ORAL at 06:30

## 2019-06-04 RX ADMIN — DOCUSATE SODIUM 100 MG: 100 CAPSULE, LIQUID FILLED ORAL at 18:02

## 2019-06-04 RX ADMIN — CITALOPRAM 10 MG: 20 TABLET ORAL at 21:00

## 2019-06-04 RX ADMIN — POLYETHYLENE GLYCOL 3350 17 G: 17 POWDER, FOR SOLUTION ORAL at 09:07

## 2019-06-04 RX ADMIN — ENOXAPARIN SODIUM 40 MG: 40 INJECTION SUBCUTANEOUS at 20:06

## 2019-06-04 RX ADMIN — LOSARTAN POTASSIUM 50 MG: 50 TABLET ORAL at 21:00

## 2019-06-04 RX ADMIN — HYDROMORPHONE HYDROCHLORIDE 1 MG: 1 INJECTION, SOLUTION INTRAMUSCULAR; INTRAVENOUS; SUBCUTANEOUS at 13:46

## 2019-06-04 RX ADMIN — HYDROMORPHONE HYDROCHLORIDE 1 MG: 1 INJECTION, SOLUTION INTRAMUSCULAR; INTRAVENOUS; SUBCUTANEOUS at 04:13

## 2019-06-04 RX ADMIN — ONDANSETRON 4 MG: 2 INJECTION INTRAMUSCULAR; INTRAVENOUS at 18:02

## 2019-06-04 RX ADMIN — ONDANSETRON 4 MG: 2 INJECTION INTRAMUSCULAR; INTRAVENOUS at 11:49

## 2019-06-04 RX ADMIN — DIPHENHYDRAMINE HYDROCHLORIDE 50 MG: 50 INJECTION, SOLUTION INTRAMUSCULAR; INTRAVENOUS at 07:30

## 2019-06-04 RX ADMIN — DIPHENHYDRAMINE HYDROCHLORIDE 50 MG: 50 INJECTION, SOLUTION INTRAMUSCULAR; INTRAVENOUS at 01:13

## 2019-06-04 RX ADMIN — Medication 10 ML: at 05:47

## 2019-06-04 RX ADMIN — Medication 10 ML: at 13:46

## 2019-06-04 RX ADMIN — FOLIC ACID 1 MG: 1 TABLET ORAL at 21:00

## 2019-06-04 RX ADMIN — DIPHENHYDRAMINE HYDROCHLORIDE 50 MG: 50 INJECTION, SOLUTION INTRAMUSCULAR; INTRAVENOUS at 11:49

## 2019-06-04 RX ADMIN — Medication 10 ML: at 22:00

## 2019-06-04 RX ADMIN — DEXTROSE MONOHYDRATE, SODIUM CHLORIDE, AND POTASSIUM CHLORIDE 75 ML/HR: 50; 4.5; .745 INJECTION, SOLUTION INTRAVENOUS at 20:58

## 2019-06-04 RX ADMIN — ONDANSETRON 4 MG: 2 INJECTION INTRAMUSCULAR; INTRAVENOUS at 01:13

## 2019-06-04 RX ADMIN — HYDROMORPHONE HYDROCHLORIDE 1 MG: 1 INJECTION, SOLUTION INTRAMUSCULAR; INTRAVENOUS; SUBCUTANEOUS at 20:06

## 2019-06-04 RX ADMIN — HYDROMORPHONE HYDROCHLORIDE 1 MG: 1 INJECTION, SOLUTION INTRAMUSCULAR; INTRAVENOUS; SUBCUTANEOUS at 07:30

## 2019-06-04 NOTE — PROGRESS NOTES
General Daily Progress Note    Admit Date: 5/26/2019    Subjective:     Patient has no complaint. Current Facility-Administered Medications   Medication Dose Route Frequency    dextrose 5% - 0.45% NaCl with KCl 10 mEq/L infusion  75 mL/hr IntraVENous CONTINUOUS    lactulose (CHRONULAC) 10 gram/15 mL solution 30 mL  20 g Oral PRN    fentaNYL (DURAGESIC) 75 mcg/hr patch 1 Patch  1 Patch TransDERmal Q72H    diphenhydrAMINE (BENADRYL) injection 50 mg  50 mg IntraVENous Q4H PRN    acetaminophen (TYLENOL) tablet 650 mg  650 mg Oral Q6H PRN    sodium chloride (NS) flush 5-40 mL  5-40 mL IntraVENous Q8H    sodium chloride (NS) flush 5-40 mL  5-40 mL IntraVENous PRN    ondansetron (ZOFRAN) injection 4 mg  4 mg IntraVENous Q6H PRN    docusate sodium (COLACE) capsule 100 mg  100 mg Oral BID    enoxaparin (LOVENOX) injection 40 mg  40 mg SubCUTAneous Q24H    HYDROmorphone (PF) (DILAUDID) injection 1 mg  1 mg IntraVENous Q3H PRN    citalopram (CELEXA) tablet 10 mg  10 mg Oral QHS    folic acid (FOLVITE) tablet 1 mg  1 mg Oral QHS    pantoprazole (PROTONIX) tablet 40 mg  40 mg Oral ACB    losartan (COZAAR) tablet 50 mg  50 mg Oral QHS        Review of Systems  A comprehensive review of systems was negative. Objective:     Patient Vitals for the past 24 hrs:   BP Temp Pulse Resp SpO2   06/03/19 2021 147/84 99.3 °F (37.4 °C) 98 16 98 %   06/03/19 1549 141/87 99.7 °F (37.6 °C) 97 16 100 %   06/03/19 1122 126/68 99.4 °F (37.4 °C) 92 16 93 %   06/03/19 0705 141/73 99.3 °F (37.4 °C) 96 16 91 %   06/03/19 0304 132/73 98.9 °F (37.2 °C) 92 16 92 %   06/02/19 2311 130/64 99.2 °F (37.3 °C) 99 16 92 %     06/03 1901 - 06/04 0700  In: 431.7 [P.O.:290; I.V.:141.7]  Out: 900 [Urine:900]  06/02 0701 - 06/03 1900  In: 3708.3 [P.O.:1200;  I.V.:2508.3]  Out: 6250 [Urine:6250]    Physical Exam:   Visit Vitals  /84 (BP 1 Location: Left arm, BP Patient Position: At rest)   Pulse 98   Temp 99.3 °F (37.4 °C)   Resp 16   Ht 5' 10\" (1.778 m)   Wt 194 lb 0.1 oz (88 kg)   SpO2 98%   BMI 27.84 kg/m²     General appearance: alert, cooperative, no distress, appears stated age  Neck: supple, symmetrical, trachea midline, no adenopathy, thyroid: not enlarged, symmetric, no tenderness/mass/nodules, no carotid bruit and no JVD  Lungs: clear to auscultation bilaterally  Heart: regular rate and rhythm, S1, S2 normal, no murmur, click, rub or gallop  Abdomen: soft, non-tender. Bowel sounds normal. No masses,  no organomegaly  Extremities: extremities normal, atraumatic, no cyanosis or edema        Data Review No results found for this or any previous visit (from the past 24 hour(s)). Assessment:     Active Problems:    Sickle cell pain crisis ([facility] (9/8/2018)        Plan:     1. Continue treatment of painful crises. Gradual improvement. Await labs from tomorrow.

## 2019-06-04 NOTE — PROGRESS NOTES
General Daily Progress Note    Admit Date: 5/26/2019    Subjective:     Patient has no complaint . Current Facility-Administered Medications   Medication Dose Route Frequency    HYDROmorphone (PF) (DILAUDID) injection 1 mg  1 mg IntraVENous Q6H PRN    dextrose 5% - 0.45% NaCl with KCl 10 mEq/L infusion  75 mL/hr IntraVENous CONTINUOUS    polyethylene glycol (MIRALAX) packet 17 g  17 g Oral DAILY    lactulose (CHRONULAC) 10 gram/15 mL solution 30 mL  20 g Oral PRN    fentaNYL (DURAGESIC) 75 mcg/hr patch 1 Patch  1 Patch TransDERmal Q72H    diphenhydrAMINE (BENADRYL) injection 50 mg  50 mg IntraVENous Q4H PRN    acetaminophen (TYLENOL) tablet 650 mg  650 mg Oral Q6H PRN    sodium chloride (NS) flush 5-40 mL  5-40 mL IntraVENous Q8H    sodium chloride (NS) flush 5-40 mL  5-40 mL IntraVENous PRN    ondansetron (ZOFRAN) injection 4 mg  4 mg IntraVENous Q6H PRN    docusate sodium (COLACE) capsule 100 mg  100 mg Oral BID    enoxaparin (LOVENOX) injection 40 mg  40 mg SubCUTAneous Q24H    citalopram (CELEXA) tablet 10 mg  10 mg Oral QHS    folic acid (FOLVITE) tablet 1 mg  1 mg Oral QHS    pantoprazole (PROTONIX) tablet 40 mg  40 mg Oral ACB    losartan (COZAAR) tablet 50 mg  50 mg Oral QHS        Review of Systems  A comprehensive review of systems was negative. Objective:     Patient Vitals for the past 24 hrs:   BP Temp Pulse Resp SpO2   06/04/19 0811 140/81 99.1 °F (37.3 °C) 91 18 91 %   06/04/19 0337 130/78 99.2 °F (37.3 °C) 92 19 93 %   06/03/19 2302 121/62 99.4 °F (37.4 °C) 99 16 97 %   06/03/19 2021 147/84 99.3 °F (37.4 °C) 98 16 98 %   06/03/19 1549 141/87 99.7 °F (37.6 °C) 97 16 100 %   06/03/19 1122 126/68 99.4 °F (37.4 °C) 92 16 93 %     06/04 0701 - 06/04 1900  In: 340 [I.V.:340]  Out: -   06/02 1901 - 06/04 0700  In: 4253.3 [P.O.:1020;  I.V.:3233.3]  Out: 5600 [Urine:5600]    Physical Exam:   Visit Vitals  /81   Pulse 91   Temp 99.1 °F (37.3 °C)   Resp 18   Ht 5' 10\" (1.778 m)   Wt 194 lb 0.1 oz (88 kg)   SpO2 91%   BMI 27.84 kg/m²     General appearance: alert, cooperative, no distress, appears stated age  Neck: supple, symmetrical, trachea midline, no adenopathy, thyroid: not enlarged, symmetric, no tenderness/mass/nodules, no carotid bruit and no JVD  Lungs: clear to auscultation bilaterally  Heart: regular rate and rhythm, S1, S2 normal, no murmur, click, rub or gallop  Abdomen: soft, non-tender. Bowel sounds normal. No masses,  no organomegaly  Extremities: extremities normal, atraumatic, no cyanosis or edema        Data Review   Recent Results (from the past 24 hour(s))   CBC WITH MANUAL DIFF    Collection Time: 06/04/19  1:20 AM   Result Value Ref Range    WBC 10.7 3.6 - 11.0 K/uL    RBC 2.30 (L) 3.80 - 5.20 M/uL    HGB 7.8 (L) 11.5 - 16.0 g/dL    HCT 22.5 (L) 35.0 - 47.0 %    MCV 97.8 80.0 - 99.0 FL    MCH 33.9 26.0 - 34.0 PG    MCHC 34.7 30.0 - 36.5 g/dL    RDW 19.2 (H) 11.5 - 14.5 %    PLATELET 367 902 - 730 K/uL    MPV 11.4 8.9 - 12.9 FL    NRBC 2.3 (H) 0  WBC    ABSOLUTE NRBC 0.25 (H) 0.00 - 0.01 K/uL    NEUTROPHILS 45 32 - 75 %    BAND NEUTROPHILS 0 0 - 6 %    LYMPHOCYTES 40 12 - 49 %    MONOCYTES 13 5 - 13 %    EOSINOPHILS 1 0 - 7 %    BASOPHILS 1 0 - 1 %    METAMYELOCYTES 0 0 %    MYELOCYTES 0 0 %    PROMYELOCYTES 0 0 %    BLASTS 0 0 %    OTHER CELL 0 0      IMMATURE GRANULOCYTES 0 %    ABS. NEUTROPHILS 4.8 1.8 - 8.0 K/UL    ABS. LYMPHOCYTES 4.3 (H) 0.8 - 3.5 K/UL    ABS. MONOCYTES 1.4 (H) 0.0 - 1.0 K/UL    ABS. EOSINOPHILS 0.1 0.0 - 0.4 K/UL    ABS. BASOPHILS 0.1 0.0 - 0.1 K/UL    ABS. IMM.  GRANS. 0.0 K/UL    DF MANUAL      RBC COMMENTS ANISOCYTOSIS  2+        RBC COMMENTS TARGET CELLS  2+        RBC COMMENTS POLYCHROMASIA  1+        RBC COMMENTS SICKLE CELLS  PRESENT       METABOLIC PANEL, COMPREHENSIVE    Collection Time: 06/04/19  1:20 AM   Result Value Ref Range    Sodium 136 136 - 145 mmol/L    Potassium 4.1 3.5 - 5.1 mmol/L    Chloride 102 97 - 108 mmol/L    CO2 30 21 - 32 mmol/L    Anion gap 4 (L) 5 - 15 mmol/L    Glucose 97 65 - 100 mg/dL    BUN 6 6 - 20 MG/DL    Creatinine 0.76 0.55 - 1.02 MG/DL    BUN/Creatinine ratio 8 (L) 12 - 20      GFR est AA >60 >60 ml/min/1.73m2    GFR est non-AA >60 >60 ml/min/1.73m2    Calcium 8.2 (L) 8.5 - 10.1 MG/DL    Bilirubin, total 2.0 (H) 0.2 - 1.0 MG/DL    ALT (SGPT) 24 12 - 78 U/L    AST (SGOT) 40 (H) 15 - 37 U/L    Alk. phosphatase 130 (H) 45 - 117 U/L    Protein, total 8.3 (H) 6.4 - 8.2 g/dL    Albumin 3.4 (L) 3.5 - 5.0 g/dL    Globulin 4.9 (H) 2.0 - 4.0 g/dL    A-G Ratio 0.7 (L) 1.1 - 2.2             Assessment:     Active Problems:    Sickle cell pain crisis ([facility] [address] (9/8/2018)        Plan:     1. We will begin to wean analgesics. Sickle crises continues to improve.

## 2019-06-04 NOTE — PROGRESS NOTES
General Surgery End of Shift Nursing Note    Bedside shift change report given to Henrry Vaughn RN (oncoming nurse) by Trent Busby RN (offgoing nurse). Report included the following information SBAR, Kardex, Intake/Output, MAR and Recent Results. Shift worked:   7p-7a   Summary of shift:   Patient able to rest during the night after pain medication is given. Patient has Dilaudid q3h. Patient able to get up and void on her own. Issues for physician to address:   None at this time. Number times ambulated in hallway past shift: 0    Number of times OOB to chair past shift: 0    Pain Management:  Current medication: See MAR  Patient states pain is manageable on current pain medication: YES    GI:    Current diet:  DIET REGULAR    Tolerating current diet: YES    Respiratory:    Incentive Spirometer at bedside: YES  Patient instructed on use: YES    Patient Safety:    Falls Score: 1  Bed Alarm On? No  Sitter?  No    Brooklyn Alcala

## 2019-06-04 NOTE — ADVANCED PRACTICE NURSE
General Surgery End of Shift Nursing Note    Bedside shift change report given to [facility] (oncoming nurse) by Elveria Apgar (offgoing nurse). Report included the following information SBAR, Kardex, Intake/Output, MAR, Recent Results and Cardiac Rhythm NSR. Shift worked:   7 am to 11 am   Summary of shift:    Pain meds given as requested. Tolerating diet. Issues for physician to address:   None     Number times ambulated in hallway past shift: 0    Number of times OOB to chair past shift: 0    Pain Management:  Current medication: dilaudid  Patient states pain is manageable on current pain medication: YES    GI:    Current diet:  DIET REGULAR    Tolerating current diet: YES  Passing flatus: YES  Last Bowel Movement: several days ago      Respiratory:    Incentive Spirometer at bedside:   Patient instructed on use:     Patient Safety:    Falls Score: 1  Bed Alarm On? No  Sitter?  No    Anupama Manning

## 2019-06-05 PROCEDURE — 74011250637 HC RX REV CODE- 250/637: Performed by: HOSPITALIST

## 2019-06-05 PROCEDURE — 74011250637 HC RX REV CODE- 250/637: Performed by: INTERNAL MEDICINE

## 2019-06-05 PROCEDURE — 74011250636 HC RX REV CODE- 250/636: Performed by: INTERNAL MEDICINE

## 2019-06-05 PROCEDURE — 94761 N-INVAS EAR/PLS OXIMETRY MLT: CPT

## 2019-06-05 PROCEDURE — 65660000000 HC RM CCU STEPDOWN

## 2019-06-05 PROCEDURE — 94762 N-INVAS EAR/PLS OXIMTRY CONT: CPT

## 2019-06-05 PROCEDURE — 74011250636 HC RX REV CODE- 250/636: Performed by: HOSPITALIST

## 2019-06-05 RX ORDER — HYDROCODONE BITARTRATE AND ACETAMINOPHEN 10; 325 MG/1; MG/1
1 TABLET ORAL
Status: DISCONTINUED | OUTPATIENT
Start: 2019-06-05 | End: 2019-06-06 | Stop reason: HOSPADM

## 2019-06-05 RX ORDER — HYDROMORPHONE HYDROCHLORIDE 1 MG/ML
0.5 INJECTION, SOLUTION INTRAMUSCULAR; INTRAVENOUS; SUBCUTANEOUS
Status: DISCONTINUED | OUTPATIENT
Start: 2019-06-05 | End: 2019-06-06 | Stop reason: HOSPADM

## 2019-06-05 RX ADMIN — FOLIC ACID 1 MG: 1 TABLET ORAL at 21:26

## 2019-06-05 RX ADMIN — HYDROMORPHONE HYDROCHLORIDE 0.5 MG: 1 INJECTION, SOLUTION INTRAMUSCULAR; INTRAVENOUS; SUBCUTANEOUS at 20:07

## 2019-06-05 RX ADMIN — Medication 10 ML: at 21:26

## 2019-06-05 RX ADMIN — ENOXAPARIN SODIUM 40 MG: 40 INJECTION SUBCUTANEOUS at 20:07

## 2019-06-05 RX ADMIN — DIPHENHYDRAMINE HYDROCHLORIDE 50 MG: 50 INJECTION, SOLUTION INTRAMUSCULAR; INTRAVENOUS at 17:48

## 2019-06-05 RX ADMIN — POLYETHYLENE GLYCOL 3350 17 G: 17 POWDER, FOR SOLUTION ORAL at 08:11

## 2019-06-05 RX ADMIN — ONDANSETRON 4 MG: 2 INJECTION INTRAMUSCULAR; INTRAVENOUS at 08:11

## 2019-06-05 RX ADMIN — LOSARTAN POTASSIUM 50 MG: 50 TABLET ORAL at 21:26

## 2019-06-05 RX ADMIN — ONDANSETRON 4 MG: 2 INJECTION INTRAMUSCULAR; INTRAVENOUS at 13:51

## 2019-06-05 RX ADMIN — CITALOPRAM 10 MG: 20 TABLET ORAL at 20:07

## 2019-06-05 RX ADMIN — DOCUSATE SODIUM 100 MG: 100 CAPSULE, LIQUID FILLED ORAL at 17:48

## 2019-06-05 RX ADMIN — DIPHENHYDRAMINE HYDROCHLORIDE 50 MG: 50 INJECTION, SOLUTION INTRAMUSCULAR; INTRAVENOUS at 00:14

## 2019-06-05 RX ADMIN — DIPHENHYDRAMINE HYDROCHLORIDE 50 MG: 50 INJECTION, SOLUTION INTRAMUSCULAR; INTRAVENOUS at 08:11

## 2019-06-05 RX ADMIN — HYDROCODONE BITARTRATE AND ACETAMINOPHEN 1 TABLET: 10; 325 TABLET ORAL at 13:39

## 2019-06-05 RX ADMIN — DEXTROSE MONOHYDRATE, SODIUM CHLORIDE, AND POTASSIUM CHLORIDE 75 ML/HR: 50; 4.5; .745 INJECTION, SOLUTION INTRAVENOUS at 11:17

## 2019-06-05 RX ADMIN — DOCUSATE SODIUM 100 MG: 100 CAPSULE, LIQUID FILLED ORAL at 08:11

## 2019-06-05 RX ADMIN — PANTOPRAZOLE SODIUM 40 MG: 40 TABLET, DELAYED RELEASE ORAL at 06:30

## 2019-06-05 RX ADMIN — Medication 10 ML: at 14:00

## 2019-06-05 RX ADMIN — HYDROMORPHONE HYDROCHLORIDE 1 MG: 1 INJECTION, SOLUTION INTRAMUSCULAR; INTRAVENOUS; SUBCUTANEOUS at 08:11

## 2019-06-05 RX ADMIN — HYDROCODONE BITARTRATE AND ACETAMINOPHEN 1 TABLET: 10; 325 TABLET ORAL at 17:48

## 2019-06-05 RX ADMIN — ONDANSETRON 4 MG: 2 INJECTION INTRAMUSCULAR; INTRAVENOUS at 00:13

## 2019-06-05 RX ADMIN — HYDROMORPHONE HYDROCHLORIDE 1 MG: 1 INJECTION, SOLUTION INTRAMUSCULAR; INTRAVENOUS; SUBCUTANEOUS at 03:15

## 2019-06-05 RX ADMIN — Medication 10 ML: at 05:50

## 2019-06-05 RX ADMIN — DIPHENHYDRAMINE HYDROCHLORIDE 50 MG: 50 INJECTION, SOLUTION INTRAMUSCULAR; INTRAVENOUS at 13:39

## 2019-06-05 NOTE — PROGRESS NOTES
Spiritual Care Assessment/Progress Note  [facility]      NAME: Albertine Paget      MRN: [id number]  AGE: 66 y.o.  SEX: female  Latter day Affiliation: Non Pentecostal   Language: English     6/5/2019     Total Time (in minutes): 12     Spiritual Assessment begun in [facility] GENERAL SURGERY through conversation with:         [x]Patient        [] Family    [] Friend(s)        Reason for Consult: Initial visit, Initial/Spiritual assessment, patient floor     Spiritual beliefs: (Please include comment if needed)     [x] Identifies with a zay tradition:         [] Supported by a zay community:            [] Claims no spiritual orientation:           [] Seeking spiritual identity:                [] Adheres to an individual form of spirituality:           [] Not able to assess:                           Identified resources for coping:      [x] Prayer                               [] Music                  [] Guided Imagery     [x] Family/friends                 [] Pet visits     [] Devotional reading                         [] Unknown     [] Other:                                               Interventions offered during this visit: (See comments for more details)    Patient Interventions: Initial visit, Initial/Spiritual assessment, patient floor, Affirmation of emotions/emotional suffering, Catharsis/review of pertinent events in supportive environment, Prayer (assurance of)           Plan of Care:     [x] Support spiritual and/or cultural needs    [] Support AMD and/or advance care planning process      [] Support grieving process   [] Coordinate Rites and/or Rituals    [] Coordination with community clergy   [] No spiritual needs identified at this time   [] Detailed Plan of Care below (See Comments)  [] Make referral to Music Therapy  [] Make referral to Pet Therapy     [] Make referral to Addiction services  [] Make referral to [facility]  [] Make referral to Spiritual Care Partner  [] No future visits requested        [x] Follow up visits as needed     Comments: made initial visit to patients room in [facility] for initial assessment. Patient was lying in bed eating lunch when  entered. Patient continued to eat during visit as she was almost done eating. Patient told of her medical condition and family support. She said her family was important and included her brother, daughter and parents. She was hoping to be going home by the weekend. She said she was a [place] and went to a [employer] when she does go.  provided pastoral support and care. Spiritual Care will follow up as needed.     Linsey Gibson MDiv  Pager: [phone]

## 2019-06-05 NOTE — ROUTINE PROCESS
General Surgery End of Shift Nursing Note Shift worked:  7a-7p Summary of shift:    Uneventful. Pain medication given throughout day. Doctor decreased dose of dilaudid and added Norco, so I've been giving Norco. Possible discharge tomorrow. Issues for physician to address:   None-still no BM Number times ambulated in hallway past shift: 0 Number of times OOB to chair past shift: 0 Pain Management: 
Current medication: See [facility] Patient states pain is manageable on current pain medication: YES 
 
GI: 
 
Current diet:  DIET REGULAR Tolerating current diet: YES Passing flatus: YES Last Bowel Movement: several days ago Appearance:  
 
Respiratory: 
 
Incentive Spirometer at bedside: YES Patient instructed on use: YES Patient Safety: 
 
Falls Score: 1 Bed Alarm On? No 
Sitter? No 
 
Sheron Frye

## 2019-06-05 NOTE — PROGRESS NOTES
General Surgery End of Shift Nursing Note    Bedside shift change report given to Lorenzo Curling, RN (oncoming nurse) by Agustina Calabrese RN (offgoing nurse). Report included the following information SBAR, Kardex, Intake/Output, MAR and Recent Results. Shift worked:   7p-7a   Summary of shift:   Took over care at 2300. Patient rested comfortably overnight. Patient receiving Dilaudid q6 PRN, Benadryl q4 PRN, and Zofran q6 PRN. Issues for physician to address:   None at this time. Number times ambulated in hallway past shift: 0    Number of times OOB to chair past shift: 0    Pain Management:  Current medication: See MAR  Patient states pain is manageable on current pain medication: YES    GI:    Current diet:  DIET REGULAR    Tolerating current diet: YES    Respiratory:    Incentive Spirometer at bedside: YES  Patient instructed on use: YES    Patient Safety:    Falls Score: 1  Bed Alarm On? No  Sitter?  No    Aimee A Ca      \

## 2019-06-05 NOTE — PROGRESS NOTES
Hospital Progress Note    NAME:  Dereje Sampson   :   1960   MRN:  [id number]     Date/Time:  2019 8:15 AM    Plan:   1. Wean analgesics  2. Home thurs if stable  Risk of Deterioration: Low  [x]           Moderate  []           High  []                 Assessment:   Principal Problem:    Sickle cell pain crisis ([facility] (2018)    Active Problems:    Hypertension (10/3/2014)      Depression (10/5/2014)      Sickle cell disease ([facility] (2/10/2019)       Admitting notes: Dereje Sampson is a 66 y.o.  female with [place] sickle cell disease and anemia, depression, HTN presents to pain in left shoulder started 3 days ago. Earlier that day had gone out of eat in [place] and was doing a lot of walking in heat which she believes triggered sickle cell pain. She tried norco at home without good relief. Went to ER yesterday and felt there was delay in getting pain treatment. Discharged home but then developed pain in hip b/l, right arm, and lumbar spine.   Pain typical of sickle cell pain, 10/10, currently 8/10 with IV dilaudid    Subjective/interium history:     Feeling better, pelvic pain continues  11 Point Review of Systems:   Negative except no cp/sob    []            Unable to obtain ROS due to:       []            mental status change []            sedated []            intubated     Social History     Tobacco Use    Smoking status: Never Smoker    Smokeless tobacco: Never Used   Substance Use Topics    Alcohol use: No     Medications reviewed:  Current Facility-Administered Medications   Medication Dose Route Frequency    HYDROcodone-acetaminophen (NORCO)  mg tablet 1 Tab  1 Tab Oral Q4H PRN    HYDROmorphone (PF) (DILAUDID) injection 0.5 mg  0.5 mg IntraVENous Q6H PRN    dextrose 5% - 0.45% NaCl with KCl 10 mEq/L infusion  75 mL/hr IntraVENous CONTINUOUS    polyethylene glycol (MIRALAX) packet 17 g  17 g Oral DAILY    lactulose (CHRONULAC) 10 gram/15 mL solution 30 mL  20 g Oral PRN    fentaNYL (DURAGESIC) 75 mcg/hr patch 1 Patch  1 Patch TransDERmal Q72H    diphenhydrAMINE (BENADRYL) injection 50 mg  50 mg IntraVENous Q4H PRN    acetaminophen (TYLENOL) tablet 650 mg  650 mg Oral Q6H PRN    sodium chloride (NS) flush 5-40 mL  5-40 mL IntraVENous Q8H    sodium chloride (NS) flush 5-40 mL  5-40 mL IntraVENous PRN    ondansetron (ZOFRAN) injection 4 mg  4 mg IntraVENous Q6H PRN    docusate sodium (COLACE) capsule 100 mg  100 mg Oral BID    enoxaparin (LOVENOX) injection 40 mg  40 mg SubCUTAneous Q24H    citalopram (CELEXA) tablet 10 mg  10 mg Oral QHS    folic acid (FOLVITE) tablet 1 mg  1 mg Oral QHS    pantoprazole (PROTONIX) tablet 40 mg  40 mg Oral ACB    losartan (COZAAR) tablet 50 mg  50 mg Oral QHS        Objective:   Vitals:  Visit Vitals  /82   Pulse 82   Temp 99 °F (37.2 °C)   Resp 18   Ht 5' 10\" (1.778 m)   Wt 194 lb 0.1 oz (88 kg)   SpO2 93%   BMI 27.84 kg/m²     Temp (24hrs), Av.3 °F (37.4 °C), Min:98.8 °F (37.1 °C), Max:99.7 °F (37.6 °C)      O2 Device: Room air    Last 24hr Input/Output:    Intake/Output Summary (Last 24 hours) at 2019 0815  Last data filed at 2019 2911  Gross per 24 hour   Intake 2193.75 ml   Output 2800 ml   Net -606.25 ml        PHYSICAL EXAM:  General:    Alert, cooperative, no distress, appears stated age. Head:   Normocephalic, without obvious abnormality, atraumatic. Eyes:   Conjunctivae/corneas clear. PERRLA  Nose:  Nares normal. No drainage or sinus tenderness. Throat:    Lips, mucosa, and tongue normal.  No Thrush  Neck:  Supple, symmetrical,  no adenopathy, thyroid: non tender    no carotid bruit and no JVD. Back:    Symmetric,  No CVA tenderness. Lungs:   Clear to auscultation bilaterally. No Wheezing or Rhonchi. No rales. Heart:   Regular rate and rhythm,  no murmur, rub or gallop. Abdomen:   Soft, non-tender. Not distended. Bowel sounds normal. No masses  .        Lab Data Reviewed:    Recent Labs 06/04/19  0120   WBC 10.7   HGB 7.8*   HCT 22.5*        Recent Labs     06/04/19  0120      K 4.1      CO2 30   GLU 97   BUN 6   CREA 0.76   CA 8.2*   ALB 3.4*   TBILI 2.0*   SGOT 40*   ALT 24     Lab Results   Component Value Date/Time    Glucose (POC) 110 (H) 05/30/2019 08:04 AM    Glucose (POC) 116 (H) 02/12/2019 04:43 PM    Glucose (POC) 126 (H) 02/12/2019 11:44 AM    Glucose (POC) 109 (H) 02/12/2019 07:37 AM    Glucose (POC) 101 (H) 02/11/2019 04:18 PM       ___________________________________________________  ___________________________________________________    Attending Physician: Evangelist Genao MD

## 2019-06-05 NOTE — PROGRESS NOTES
Bedside shift change report given to Monster Summers RN (oncoming nurse) by Jackie Morillo RN (offgoing nurse). Report included the following information SBAR.

## 2019-06-06 ENCOUNTER — TELEPHONE (OUTPATIENT)
Dept: INTERNAL MEDICINE CLINIC | Age: 59
End: 2019-06-06

## 2019-06-06 VITALS
SYSTOLIC BLOOD PRESSURE: 120 MMHG | OXYGEN SATURATION: 93 % | BODY MASS INDEX: 27.77 KG/M2 | WEIGHT: 194 LBS | TEMPERATURE: 99 F | DIASTOLIC BLOOD PRESSURE: 70 MMHG | HEART RATE: 87 BPM | RESPIRATION RATE: 18 BRPM | HEIGHT: 70 IN

## 2019-06-06 LAB
DEPRECATED HGB OTHER BLD-IMP: 0 %
HGB A MFR BLD: 0 % (ref 96.4–98.8)
HGB A2 MFR BLD COLUMN CHROM: 2.7 % (ref 1.8–3.2)
HGB C MFR BLD: 0 %
HGB F MFR BLD: 17.6 % (ref 0–2)
HGB FRACT BLD-IMP: ABNORMAL
HGB S BLD QL SOLY: POSITIVE
HGB S MFR BLD: 79.7 %

## 2019-06-06 PROCEDURE — 74011250637 HC RX REV CODE- 250/637: Performed by: HOSPITALIST

## 2019-06-06 PROCEDURE — 94760 N-INVAS EAR/PLS OXIMETRY 1: CPT

## 2019-06-06 PROCEDURE — 74011250636 HC RX REV CODE- 250/636: Performed by: INTERNAL MEDICINE

## 2019-06-06 PROCEDURE — 74011250636 HC RX REV CODE- 250/636: Performed by: HOSPITALIST

## 2019-06-06 PROCEDURE — 74011250637 HC RX REV CODE- 250/637: Performed by: INTERNAL MEDICINE

## 2019-06-06 RX ORDER — HYDROCODONE BITARTRATE AND ACETAMINOPHEN 10; 325 MG/1; MG/1
1 TABLET ORAL
Qty: 120 TAB | Refills: 0 | Status: SHIPPED | OUTPATIENT
Start: 2019-06-06 | End: 2019-06-25 | Stop reason: SDUPTHER

## 2019-06-06 RX ORDER — FENTANYL 75 UG/H
1 PATCH TRANSDERMAL
Qty: 10 PATCH | Refills: 0 | Status: SHIPPED | OUTPATIENT
Start: 2019-06-06 | End: 2019-07-06

## 2019-06-06 RX ADMIN — HYDROCODONE BITARTRATE AND ACETAMINOPHEN 1 TABLET: 10; 325 TABLET ORAL at 04:28

## 2019-06-06 RX ADMIN — HYDROCODONE BITARTRATE AND ACETAMINOPHEN 1 TABLET: 10; 325 TABLET ORAL at 08:38

## 2019-06-06 RX ADMIN — Medication 10 ML: at 08:40

## 2019-06-06 RX ADMIN — DEXTROSE MONOHYDRATE, SODIUM CHLORIDE, AND POTASSIUM CHLORIDE 75 ML/HR: 50; 4.5; .745 INJECTION, SOLUTION INTRAVENOUS at 01:19

## 2019-06-06 RX ADMIN — ONDANSETRON 4 MG: 2 INJECTION INTRAMUSCULAR; INTRAVENOUS at 04:32

## 2019-06-06 RX ADMIN — PANTOPRAZOLE SODIUM 40 MG: 40 TABLET, DELAYED RELEASE ORAL at 06:33

## 2019-06-06 RX ADMIN — HYDROCODONE BITARTRATE AND ACETAMINOPHEN 1 TABLET: 10; 325 TABLET ORAL at 12:31

## 2019-06-06 RX ADMIN — Medication 10 ML: at 06:33

## 2019-06-06 RX ADMIN — DIPHENHYDRAMINE HYDROCHLORIDE 50 MG: 50 INJECTION, SOLUTION INTRAMUSCULAR; INTRAVENOUS at 04:28

## 2019-06-06 RX ADMIN — DIPHENHYDRAMINE HYDROCHLORIDE 50 MG: 50 INJECTION, SOLUTION INTRAMUSCULAR; INTRAVENOUS at 08:38

## 2019-06-06 RX ADMIN — DOCUSATE SODIUM 100 MG: 100 CAPSULE, LIQUID FILLED ORAL at 09:00

## 2019-06-06 RX ADMIN — DIPHENHYDRAMINE HYDROCHLORIDE 50 MG: 50 INJECTION, SOLUTION INTRAMUSCULAR; INTRAVENOUS at 12:31

## 2019-06-06 RX ADMIN — POLYETHYLENE GLYCOL 3350 17 G: 17 POWDER, FOR SOLUTION ORAL at 08:13

## 2019-06-06 RX ADMIN — ONDANSETRON 4 MG: 2 INJECTION INTRAMUSCULAR; INTRAVENOUS at 10:36

## 2019-06-06 NOTE — PROGRESS NOTES
Attempted to make follow up appointment with Dr Lino Jaimes next week but office said they will have to put a message into the nurse for an appointment.  Nurse will call patient with appointment

## 2019-06-06 NOTE — DISCHARGE INSTRUCTIONS
Patient Discharge Instructions    Yrn Hernandez / [id number] : 1960    Admitted 2019 Discharged: 2019     Patient Active Problem List   Diagnosis Code    Hypertension I10    Venous insufficiency I87.2    Depression F32.9    Sickle cell crisis ([facility] D57.00    Pulsatile tinnitus H93. A9    Carpal tunnel syndrome of right wrist G56.01    Sickle cell pain crisis ([facility] D57.00    Overweight (BMI 25.0-29. 9) E66.3    Sickle cell disease ([facility] D57.1       Take Home Medications            · It is important that you take the medication exactly as they are prescribed. · Keep your medication in the bottles provided by the pharmacist and keep a list of the medication names, dosages, and times to be taken in your wallet. · Do not take other medications without consulting your doctor. What to do at Home    Recommended diet: Regular Diet,   Recommended activity: Activity as tolerated,     Follow-up with KAREL SOUTH MD  in 5 day        Information obtained by :  I understand that if any problems occur once I am at home I am to contact my physician. I understand and acknowledge receipt of the instructions indicated above.                                                                                                                                            Physician's or R.N.'s Signature                                                                  Date/Time                                                                                                                                              Patient or Representative Signature                                                          Date/Time

## 2019-06-06 NOTE — PROGRESS NOTES
General Surgery End of Shift Nursing Note    Bedside shift change report given to Aubree Avila RN (oncoming nurse) by Noble Bang RN (offgoing nurse). Report included the following information SBAR, Kardex, Intake/Output, MAR and Recent Results. Shift worked:   7p-7a   Summary of shift:   Patient medicated for pain overnight twice. Patient to be discharged today, 6/6. Issues for physician to address:   None at this time. Number times ambulated in hallway past shift: 0    Number of times OOB to chair past shift: 0    Pain Management:  Current medication: See MAR  Patient states pain is manageable on current pain medication: YES    GI:    Current diet:  DIET REGULAR    Tolerating current diet: YES  Passing flatus: YES    Respiratory:    Incentive Spirometer at bedside: YES  Patient instructed on use: YES    Patient Safety:    Falls Score: 1  Bed Alarm On? No  Sitter?  No    Tacey Essex

## 2019-06-06 NOTE — PROGRESS NOTES
Medicare pt has received, reviewed, and signed 2nd IM letter informing them of their right to appeal the discharge. Signed copy has been placed on pt bedside chart.                 Pipo Mahoney  [phone]

## 2019-06-06 NOTE — PROGRESS NOTES
Hospital Progress Note    NAME:  Niya Fuller   :   1960   MRN:  [id number]     Date/Time:  2019 8:15 AM    Plan:   1. Home today  Risk of Deterioration: Low  [x]           Moderate  []           High  []                 Assessment:   Principal Problem:    Sickle cell pain crisis ([facility] (2018)    Active Problems:    Hypertension (10/3/2014)      Depression (10/5/2014)      Sickle cell disease ([facility] (2/10/2019)       Admitting notes: Niya Fuller is a 66 y.o.  female with SC sickle cell disease and anemia, depression, HTN presents to pain in left shoulder started 3 days ago. Earlier that day had gone out of eat in [place] and was doing a lot of walking in heat which she believes triggered sickle cell pain. She tried norco at home without good relief. Went to ER yesterday and felt there was delay in getting pain treatment. Discharged home but then developed pain in hip b/l, right arm, and lumbar spine.   Pain typical of sickle cell pain, 10/10, currently 8/10 with IV dilaudid    Subjective/interium history:     Feeling better  11 Point Review of Systems:   Negative except no cp/sob    []            Unable to obtain ROS due to:       []            mental status change []            sedated []            intubated     Social History     Tobacco Use    Smoking status: Never Smoker    Smokeless tobacco: Never Used   Substance Use Topics    Alcohol use: No     Medications reviewed:  Current Facility-Administered Medications   Medication Dose Route Frequency    HYDROcodone-acetaminophen (NORCO)  mg tablet 1 Tab  1 Tab Oral Q4H PRN    HYDROmorphone (PF) (DILAUDID) injection 0.5 mg  0.5 mg IntraVENous Q6H PRN    dextrose 5% - 0.45% NaCl with KCl 10 mEq/L infusion  75 mL/hr IntraVENous CONTINUOUS    polyethylene glycol (MIRALAX) packet 17 g  17 g Oral DAILY    lactulose (CHRONULAC) 10 gram/15 mL solution 30 mL  20 g Oral PRN    fentaNYL (DURAGESIC) 75 mcg/hr patch 1 Patch 1 Patch TransDERmal Q72H    diphenhydrAMINE (BENADRYL) injection 50 mg  50 mg IntraVENous Q4H PRN    acetaminophen (TYLENOL) tablet 650 mg  650 mg Oral Q6H PRN    sodium chloride (NS) flush 5-40 mL  5-40 mL IntraVENous Q8H    sodium chloride (NS) flush 5-40 mL  5-40 mL IntraVENous PRN    ondansetron (ZOFRAN) injection 4 mg  4 mg IntraVENous Q6H PRN    docusate sodium (COLACE) capsule 100 mg  100 mg Oral BID    enoxaparin (LOVENOX) injection 40 mg  40 mg SubCUTAneous Q24H    citalopram (CELEXA) tablet 10 mg  10 mg Oral QHS    folic acid (FOLVITE) tablet 1 mg  1 mg Oral QHS    pantoprazole (PROTONIX) tablet 40 mg  40 mg Oral ACB    losartan (COZAAR) tablet 50 mg  50 mg Oral QHS        Objective:   Vitals:  Visit Vitals  /77 (BP Patient Position: At rest)   Pulse 86   Temp 98.4 °F (36.9 °C)   Resp 16   Ht 5' 10\" (1.778 m)   Wt 194 lb 0.1 oz (88 kg)   SpO2 94%   BMI 27.84 kg/m²     Temp (24hrs), Av.9 °F (37.2 °C), Min:98.4 °F (36.9 °C), Max:99.1 °F (37.3 °C)      O2 Device: Room air    Last 24hr Input/Output:    Intake/Output Summary (Last 24 hours) at 2019 0630  Last data filed at 2019 0434  Gross per 24 hour   Intake 2498.75 ml   Output 2800 ml   Net -301.25 ml        PHYSICAL EXAM:  General:    Alert, cooperative, no distress, appears stated age. Head:   Normocephalic, without obvious abnormality, atraumatic. Eyes:   Conjunctivae/corneas clear. PERRLA  Nose:  Nares normal. No drainage or sinus tenderness. Throat:    Lips, mucosa, and tongue normal.  No Thrush  Neck:  Supple, symmetrical,  no adenopathy, thyroid: non tender    no carotid bruit and no JVD. Back:    Symmetric,  No CVA tenderness. Lungs:   Clear to auscultation bilaterally. No Wheezing or Rhonchi. No rales. Heart:   Regular rate and rhythm,  no murmur, rub or gallop. Abdomen:   Soft, non-tender. Not distended. Bowel sounds normal. No masses  .        Lab Data Reviewed:    Recent Labs     19  0120   WBC 10.7 HGB 7.8*   HCT 22.5*        Recent Labs     06/04/19  0120      K 4.1      CO2 30   GLU 97   BUN 6   CREA 0.76   CA 8.2*   ALB 3.4*   TBILI 2.0*   SGOT 40*   ALT 24     Lab Results   Component Value Date/Time    Glucose (POC) 110 (H) 05/30/2019 08:04 AM    Glucose (POC) 116 (H) 02/12/2019 04:43 PM    Glucose (POC) 126 (H) 02/12/2019 11:44 AM    Glucose (POC) 109 (H) 02/12/2019 07:37 AM    Glucose (POC) 101 (H) 02/11/2019 04:18 PM       ___________________________________________________  ___________________________________________________    Attending Physician: Lanette Alford MD

## 2019-06-06 NOTE — PROGRESS NOTES
KRISTINA Plan:     Home with no needs    CM spoke with pt concerning discharge. Pt declines any needs at this time and states that these hospital stays are routine for her. Pt friend will transport home this afternoon. Pt expects to discharge home by 2pm.     CM will continue to follow patient for discharge planning needs and arrange for services as deemed necessary.     Karma Chatterjee, Care Manager  [phone]

## 2019-06-07 ENCOUNTER — PATIENT OUTREACH (OUTPATIENT)
Dept: INTERNAL MEDICINE CLINIC | Age: 59
End: 2019-06-07

## 2019-06-07 NOTE — PROGRESS NOTES
Goals Addressed                 This Visit's Progress     Coordinate pain management plan for patient. 3/6/2019:  Patient states pain is worst today since d/c due to coldness outside. Patient agreed to warmer clothing, eating less junk foods (sugary products), and drinking an increased water amount. [facility] Fentanyl patch is not working as would like but still uses it along with other pain medications. PCP visit in AM.  EW     5/3/2019:  Patient c/o inability to  fentanyl patch due to needing PA. NN spoke with PharmD Adore Arita at [place] [address]; [facility] system is not allowing Fentanyl Patch pickup w/out PA due to [place]/Morphine in Patch, and patient is also receiving Norco.  States patch placed on yesterday at hospital-5/2/2019 and lasts 3 days, but Simba Weiner is approved for pickup on Sunday 5/5. Patient given contact #  should there be a problem over the weekend. EW    5/9/2019:  Patient c/o pain today; c/o to PCP of not being able to have Fentanyl Patches any longer due to insurance not covering. PCP provided explanation of addiction potential when given agents of the same medication such as hydrocodone and fentanyl patches. Patient is anemic as well as Hepatitis. Red flags again reviewed by PCP. Red flags reiterated by NN. Teach-back Method:  Patient agreed to balance diet;-minimal sugary products;  maintain use of motrin and Vitamin Supplements and increase water intake and increased activity to avoid stiffness of joints. Will continue to f/u.  Patient/Family verbalizes understanding of self-management of chronic disease.         5/9/2019: [facility] 4/14-5/2/2019:  Sickle Cell Crisis f/u -see coordinate Pain Management Plan goal.  EW

## 2019-06-07 NOTE — PROGRESS NOTES
[facility] 5/26-6/6/2019:  Sickle Cell Crisis    NN received message from [facility] ED Utilization [address]. Consult done for Management Development Plan for Sickle Cell Crisis Management including referral to [address] [place] [address] [address] Referral, usage of hydroxyurea as prescribe medication-contraindications if any in light of patient's other morbid chronic conditions such as Hepatitis, Anemia, etc.   PCP out of the office thus unable to consult with today. Will continue to f/u with Utilization Mgmt Team member post plan development/team collaboration & corrdination. Patient listed on Assigned Patient Report dated 6/7/2019: NN w/ f/u with KRISTINA contact today.

## 2019-06-07 NOTE — PROGRESS NOTES
[facility] -2019:  Sickle [address] Discharge Follow-Up    Date/Time:  2019 12:21 PM    Patient was admitted to [facility] on 2019 and discharged on 2019 for Sickle Cell Crisis. The physician discharge summary was available at the time of outreach. Patient was contacted within 2 business days of discharge. Top Challenges reviewed with the provider   Might Fentanyl be taken off Med List?  ACP         Method of communication with provider : Note    Inpatient RRAT score: 25  Was this a readmission? yes   Patient stated reason for the readmission: pain in joints    Nurse Navigator (NN) contacted the patient by telephone to perform post hospital discharge assessment. Verified name and  with patient as identifiers. Provided introduction to self, and explanation of the Nurse Navigator role. Reviewed discharge instructions and red flags with patient who verbalized understanding. Patient given an opportunity to ask questions and does not have any further questions or concerns at this time. The patient agrees to contact the PCP office for questions related to their healthcare. NN provided contact information for future reference. Disease Specific:   none    Summary of patient's top problems:  Patient admitted; placed on IV Analgesic and IV fluids. Gave Dilaudid PO q 3hrs PRN. Received Fentanyl Patch 75mg. HH :None  Home Health company: n/a  Date of initial visit: n/a    Durable Medical Equipment ordered/company: none  Durable Medical Equipment received: n/a    Barriers to care?  Utilization of Services/Depression    Advance Care Planning:   Does patient have an Advance Directive:  not on file; education provided   Healthcare Decision Maker:  Dorian Ruth-Brother;  Sherron Lewis-Daughter    Medication(s):   New Medications at Discharge: none  Changed Medications at Discharge: none  Discontinued Medications at Discharge: none    Medication reconciliation was performed with patient, who verbalizes understanding of administration of home medications. There were barriers to obtaining medications identified at this time including the non-coverage for the Fentanyl Patch which was continued in d/c summary but will not be paid for by Payor--(NN spoke with PharmD & Payor on previous f/u. Referral to Pharm D needed: no     Current Outpatient Medications   Medication Sig    HYDROcodone-acetaminophen (NORCO)  mg tablet Take 1 Tab by mouth every six (6) hours as needed for Pain for up to 30 days. Max Daily Amount: 4 Tabs. Indications: sickl;e cell crisis    folic acid (FOLVITE) 1 mg tablet Take 1 mg by mouth nightly.  telmisartan (MICARDIS) 40 mg tablet Take 40 mg by mouth nightly.  omeprazole (PRILOSEC OTC) 20 mg tablet Take 20 mg by mouth daily.  promethazine (PHENERGAN) 25 mg tablet Take 1 Tab by mouth every eight (8) hours as needed for Nausea.  citalopram (CELEXA) 10 mg tablet TAKE 1 TABLET BY MOUTH EVERY NIGHT AT BEDTIME    ibuprofen (MOTRIN) 200 mg tablet Take 400 mg by mouth every six (6) hours as needed for Pain.  diphenhydrAMINE (BENADRYL) 25 mg tablet Take 50 mg by mouth daily as needed (for itching with hydrocodone).  hydrocortisone (CORTISONE) 1 % topical cream Apply  to affected area two (2) times daily as needed for Skin Irritation. use thin layer    fentaNYL (DURAGESIC) 75 mcg/hr 1 Patch by TransDERmal route every seventy-two (72) hours for 30 days. Max Daily Amount: 1 Patch. No current facility-administered medications for this visit. There are no discontinued medications.     [facility] follow up appointment(s):   Future Appointments   Date Time Provider Waldo [place]   6/14/2019 12:00 PM Raya Johnston MD [address] [place] [address]      Non-[facility] follow up appointment(s): none  [facility]:  n/a     Goals Addressed                 This Visit's Progress     Attends follow up appointments on schedule 6/7/2019:  Patient scheduled for ROME VAUGHN appt as requested: Friday 6/14/2019:  12PM.  EW        Coordinate pain management plan for patient. On track     3/6/2019:  Patient states pain is worst today since d/c due to coldness outside. Patient agreed to warmer clothing, eating less junk foods (sugary products), and drinking an increased water amount. States Fentanyl patch is not working as would like but still uses it along with other pain medications. PCP visit in AM.  EW     5/3/2019:  Patient c/o inability to  fentanyl patch due to needing PA. NN spoke with PharmD Zabrina Cote at [place]; [facility] system is not allowing Fentanyl Patch pickup w/out PA due to [place]/Morphine in Patch, and patient is also receiving Norco.  States patch placed on yesterday at hospital-5/2/2019 and lasts 3 days, but Tmo Shore is approved for pickup on Sunday 5/5. Patient given contact #  should there be a problem over the weekend. EW    5/9/2019:  Patient c/o pain today; c/o to PCP of not being able to have Fentanyl Patches any longer due to insurance not covering. PCP provided explanation of addiction potential when given agents of the same medication such as hydrocodone and fentanyl patches. Patient is anemic as well as Hepatitis. Red flags again reviewed by PCP. Red flags reiterated by NN. Teach-back Method:  Patient agreed to balance diet;-minimal sugary products;  maintain use of motrin and Vitamin Supplements and increase water intake and increased activity to avoid stiffness of joints. Will continue to f/u.  Patient/Family verbalizes understanding of self-management of chronic disease. On track     5/9/2019: Yarely Hinds 4/14-5/2/2019:  Sickle Cell Crisis f/u -see coordinate Pain Management Plan goal.  EW     6/7/2019:  Continued teachings done with rationale:    Drink plenty of fluids- dehydrated can increase your risk of a sickle crisis.  Recommendation is about 8 glasses a day and drink more fluid if youre exercising or in hot weather. Sleep--encouraged to get enough sleep. Eat right-plenty of fruits, vegetables, whole grains, and protein--discussed the type meat/proteins she enjoyed; Kd Coyle Encouraged exercise in moderation for her; to aim to increase simply walking more around the house/any mod exercise a week;  Encouraged patient to PCP before starting a new exercise routine--Patient stated PCP has been on her at every visit to increase exercise/activity on a daily basis--discussed physical activity is key in staying healthy. NN warned against trying to  overdo it. Rest when you get tired. Take medicine as ordered. Make sure  take your prescription medicine as directed. Get medical and lab tests that your doctor recommends. Discussion on getting the annual flu shot, and pneumococcal and meningococcal vaccines as recommended by PCP in that common illnesses, like the flu, can quickly become dangerous. Discussion on Extreme temperatures: extreme heat or cold, or any craig changes in temperatures, could set off a crisis. Reminded patient of High altitude--leading to Lack of oxygen at high altitudes could trigger a crisis. (Reminded that Planes, because theyre pressurized, shouldnt be a problem). Alcohol. It can make you dehydrated--patient denied consumptions of;    Smoking. This can trigger a lung condition called acute chest syndrome. This is when sickle cells stick together and block oxygen from getting into your lungs (even warned patient re walking in and out of stores that smokers congregate smoking. Infections. Common illnesses can be very serious for people with SCD. Wash your hands before eating or after using the bathroom. Wash your fruits and veggies, and avoid raw meat, eggs, and unpasteurized milk.    Stress-  Though sometimes hard to avoid, but stress can trigger a crisis--advised to try to take time to relax or find techniques that help you calm down and such to prevent and/or relieve depression episodes. Patient agreed to try.   EW            goal completion:  6/14/2019

## 2019-06-14 ENCOUNTER — DOCUMENTATION ONLY (OUTPATIENT)
Dept: CASE MANAGEMENT | Age: 59
End: 2019-06-14

## 2019-06-14 ENCOUNTER — PATIENT OUTREACH (OUTPATIENT)
Dept: INTERNAL MEDICINE CLINIC | Age: 59
End: 2019-06-14

## 2019-06-14 ENCOUNTER — OFFICE VISIT (OUTPATIENT)
Dept: INTERNAL MEDICINE CLINIC | Age: 59
End: 2019-06-14

## 2019-06-14 VITALS
RESPIRATION RATE: 16 BRPM | OXYGEN SATURATION: 98 % | BODY MASS INDEX: 28.19 KG/M2 | TEMPERATURE: 99 F | WEIGHT: 196.9 LBS | DIASTOLIC BLOOD PRESSURE: 81 MMHG | SYSTOLIC BLOOD PRESSURE: 130 MMHG | HEART RATE: 80 BPM | HEIGHT: 70 IN

## 2019-06-14 DIAGNOSIS — I10 ESSENTIAL HYPERTENSION: ICD-10-CM

## 2019-06-14 DIAGNOSIS — E66.3 OVERWEIGHT (BMI 25.0-29.9): ICD-10-CM

## 2019-06-14 DIAGNOSIS — D57.1 HB-SS DISEASE WITHOUT CRISIS (HCC): Primary | ICD-10-CM

## 2019-06-14 DIAGNOSIS — F32.A DEPRESSION, UNSPECIFIED DEPRESSION TYPE: ICD-10-CM

## 2019-06-14 NOTE — MANAGEMENT PLAN
Patient Management Plan        Pt Name: Eyad gutierrez :1960        Management Plan by: Pastor Pratt Team                                                                                         Date Placed:  19     Pt's Physicians:         Primary Care:  Dejan Ye MD (Sports Medicine): NN: Ophelia Aldridge, RN: [phone]               Summary    Reason for Referral: This patient has been provided a management plan for Chronic Pain and Medical Needs     Patient with frequent visits and admissions for: Chronic pain s/p Sickle Cell     Warning/Safety Alert:     :   Prescriptions: 32   Prescribers:  3 Pharmacies:  5     (PCP prescribes controlled substances monthly and patient is on a controlled substance contract)    Narx Report Scores and Risk Indicators: Narcotic:  411   Sedative: 170    Risk Score:  410 . This indicates the pt is  25  times more likely to experience an unintentional overdose than the general population. Guidelines recommend discussing concerns with pt/review use patterns for unsafe conditions. Situation: Chronic Conditions Summary -     Root Cause Medical Problems List:  Sickle Cell Dz, Chronic Hep C, HTN  Root Cause Psychosocial Problems List:  Depression (stable per PCP)  Root Cause Social Determinants of Health (SDOH):  Lives alone. Support system: daughter and brother who live nearby    Incidence of Testing: Over past 12 months:  15 XR, 1 CT   Pattern of access between 2018-2019: 9 (8 resulted in admission) [facility] ED visits. No known outside facilities used. Sporadic times. 8 hospitalizations. Goals/Interventions:      ED Provider/nursing(together) to discuss mgmt plan w/ pt   ED Provider to review  with pt   Nursing to obtain ordered UDS   Nursing/CM obtain updated list of current providers and attempt to obtain MAXINE for coordination of care  o Request/obtain consent for [address] team member for follow up outreach.    Consider non-narcotic medications/alternatives to benzodiazepines unless emergently necessary.  Educate patient on the appropriate use of ED. Provide Urgent Care and Dispatch Julio information   Include the Opioid overdose: Naloxone general Info in AVS. Encourage pt to identify someone who can administer Narcan in the event pt becomes unresponsive.  Consider contacting PCP prior to giving opioids. Pt has a pain contract with PCP      During Business Hours: CM/PCPs Office/NN  After Hours: BSMART(only if pt in crisis)     Challenges for Self Management:      Impairment of cognition and/or functional limitations: Generally none except while in Sickle pain   Financial Constraints:  Unknown   Utilization: HIGH ED Utilization-High Admissions   Emotional status of Patient: Pleasant, likely tearful during pain crisis    Behavioral Health Factors:  Social isolation may contribute to frequent hospitalizations   Motivational level: Uncertain; pt refuses to take hydroxyurea  Transportation: Prior to arranging transport, ask pt to call family member for ride home, if pt cannot locate ride, staff to call emergency contact and request ride for pt. Medication Management: Poly-pharm     Advanced Care Plan: None          ** This plan has been created by the Care Coordination Committee, a multi-disciplinary team. The patient and their physician were invited to participate in this plan. This management plan is intended to provide consistent evaluation and treatment for this patient not to supersede physician judgment. **

## 2019-06-14 NOTE — PROGRESS NOTES
Chief Complaint   Patient presents with    Sickle Cell Disease     3 month follow up      1. Have you been to the ER, urgent care clinic since your last visit? Hospitalized since your last visit? Yes When: 5/26/19 Where: [facility] Reason for visit: Sickle Cell Crisis. 2. Have you seen or consulted any other health care providers outside of the [address] [place] [address] since your last visit? Include any pap smears or colon screening.  No

## 2019-06-15 NOTE — PROGRESS NOTES
[address] [facility]  [facility]  [address] [place] [address]  Phone:  [phone]  Fax: [phone]       Chief Complaint   Patient presents with    Sickle Cell Disease     3 month follow up    . SUBJECTIVE:    Jesus Cm is a 66 y.o. female Comes in for return visit stating that she has felt well since being discharged. She has had no major pain other than the usual aches and pains that occur with her sickle cell disease, for which she takes hydrocodone. On her last flare, this was currently set in motion by stress. She has SC hemoglobinopathy. During her last stay she did not require transfusions because of stability of her hemoglobin in the 7 to 8 range. She has a past history of primary hypertension and depression, as well as being overweight. Current Outpatient Medications   Medication Sig Dispense Refill    fentaNYL (DURAGESIC) 75 mcg/hr 1 Patch by TransDERmal route every seventy-two (72) hours for 30 days. Max Daily Amount: 1 Patch. 10 Patch 0    HYDROcodone-acetaminophen (NORCO)  mg tablet Take 1 Tab by mouth every six (6) hours as needed for Pain for up to 30 days. Max Daily Amount: 4 Tabs. Indications: sickl;e cell crisis 792 Tab 0    folic acid (FOLVITE) 1 mg tablet Take 1 mg by mouth nightly.  telmisartan (MICARDIS) 40 mg tablet Take 40 mg by mouth nightly.  omeprazole (PRILOSEC OTC) 20 mg tablet Take 20 mg by mouth daily.  promethazine (PHENERGAN) 25 mg tablet Take 1 Tab by mouth every eight (8) hours as needed for Nausea. 90 Tab 11    citalopram (CELEXA) 10 mg tablet TAKE 1 TABLET BY MOUTH EVERY NIGHT AT BEDTIME 90 Tab 3    ibuprofen (MOTRIN) 200 mg tablet Take 400 mg by mouth every six (6) hours as needed for Pain.  diphenhydrAMINE (BENADRYL) 25 mg tablet Take 50 mg by mouth daily as needed (for itching with hydrocodone).       hydrocortisone (CORTISONE) 1 % topical cream Apply  to affected area two (2) times daily as needed for Skin Irritation.  use thin layer       Past Medical History:   Diagnosis Date    Anemia     SC hemoglobinopathy    Chronic pain     Depression     Hypertension     Liver disease     hepatitis C; pt reports \"cured\"    Sickle cell disease ([address]      Past Surgical History:   Procedure Laterality Date    BREAST SURGERY PROCEDURE UNLISTED      2 cysts removed from R breast    CHEST SURGERY PROCEDURE UNLISTED      status post thor for removal of a benign     HX BREAST BIOPSY Right 05/2007    negative    HX CHOLECYSTECTOMY      HX ORTHOPAEDIC      bony curettage of an ostial myelitis focus     Allergies   Allergen Reactions    Dilaudid [Hydromorphone (Bulk)] Itching     Can tolerate with benadryl and ondansetron    Percocet [Oxycodone-Acetaminophen] Itching         REVIEW OF SYSTEMS:  General: negative for - chills or fever  ENT: negative for - headaches, nasal congestion or tinnitus  Respiratory: negative for - cough, hemoptysis, shortness of breath or wheezing  Cardiovascular : negative for - chest pain, edema, palpitations or shortness of breath  Gastrointestinal: negative for - abdominal pain, blood in stools, heartburn or nausea/vomiting  Genito-Urinary: no dysuria, trouble voiding, or hematuria  Musculoskeletal: negative for - gait disturbance, joint pain, joint stiffness or joint swelling  Neurological: no TIA or stroke symptoms  Hematologic: no bruises, no bleeding, no swollen glands  Integument: no lumps, mole changes, nail changes or rash  Endocrine: no malaise/lethargy or unexpected weight changes      Social History     Socioeconomic History    Marital status:      Spouse name: Not on file    Number of children: 2    Years of education: Not on file    Highest education level: Not on file   Occupational History    Occupation: disabled---Sickle cell disease   Tobacco Use    Smoking status: Never Smoker    Smokeless tobacco: Never Used   Substance and Sexual Activity  Alcohol use: No    Drug use: No    Sexual activity: Yes     Partners: Male     Family History   Problem Relation Age of Onset    Hypertension Mother     Hypertension Father        OBJECTIVE:    Visit Vitals  /81   Pulse 80   Temp 99 °F (37.2 °C) (Oral)   Resp 16   Ht 5' 10\" (1.778 m)   Wt 196 lb 14.4 oz (89.3 kg)   SpO2 98%   BMI 28.25 kg/m²     CONSTITUTIONAL: well , well nourished, appears age appropriate  EYES: perrla, eom intact  ENMT:moist mucous membranes, pharynx clear  NECK: supple. Thyroid normal  RESPIRATORY: Chest: clear to ascultation and percussion   CARDIOVASCULAR: Heart: regular rate and rhythm  GASTROINTESTINAL: Abdomen: soft, bowel sounds active  HEMATOLOGIC: no pathological lymph nodes palpated  MUSCULOSKELETAL: Extremities: no edema, pulse 1+   INTEGUMENT: No unusual rashes or suspicious skin lesions noted. Nails appear normal.  NEUROLOGIC: non-focal exam   MENTAL STATUS: alert and oriented, appropriate affect      ASSESSMENT:  1. Hb-SS disease without crisis ([address]    2. Essential hypertension    3. Depression, unspecified depression type    4. Overweight (BMI 25.0-29. 9)        PLAN:    1. The sickle cell disease is reasonably stable. She will continue her analgesics as described. 2. Her BP is excellent, no adjustments are made. 3. Her depression is reasonably stable for now. She will continue antidepressant as prescribed. I do not think she needs anything else to accommodate the periodic anxiety that occurs. 4. She really needs to lose weight also. She is progressively gaining. This can be accomplished by eating meals, eliminating snacks and avoiding the consumption of processed carbohydrates. Follow-up and Dispositions    · Return in about 3 months (around 9/14/2019).            Sarah Quintanilla MD

## 2019-06-19 ENCOUNTER — PATIENT OUTREACH (OUTPATIENT)
Dept: INTERNAL MEDICINE CLINIC | Age: 59
End: 2019-06-19

## 2019-06-19 NOTE — PROGRESS NOTES
[facility] 5/26-6/6/2019:  Sickle Cell Crisis f/u contact    Goals Addressed                 This Visit's Progress     Coordinate pain management plan for patient. 3/6/2019:  Patient states pain is worst today since d/c due to coldness outside. Patient agreed to warmer clothing, eating less junk foods (sugary products), and drinking an increased water amount. States Fentanyl patch is not working as would like but still uses it along with other pain medications. PCP visit in AM.  EW     5/3/2019:  Patient c/o inability to  fentanyl patch due to needing PA. NN spoke with PharmD Wes Deras at [facility]; [employer] system is not allowing Fentanyl Patch pickup w/out PA due to [place]/Morphine in Patch, and patient is also receiving Norco.  States patch placed on yesterday at hospital-5/2/2019 and lasts 3 days, but Ángela Merl is approved for pickup on Sunday 5/5. Patient given contact #  should there be a problem over the weekend. EW    5/9/2019:  Patient c/o pain today; c/o to PCP of not being able to have Fentanyl Patches any longer due to insurance not covering. PCP provided explanation of addiction potential when given agents of the same medication such as hydrocodone and fentanyl patches. Patient is anemic as well as Hepatitis. Red flags again reviewed by PCP. Red flags reiterated by NN. Teach-back Method:  Patient agreed to balance diet;-minimal sugary products;  maintain use of motrin and Vitamin Supplements and increase water intake and increased activity to avoid stiffness of joints. Will continue to f/u.      6/19/2019:  Pain Management discussion:         Patient/Family verbalizes understanding of self-management of chronic disease. On track     5/9/2019: Vikki Zhou 4/14-5/2/2019:  Sickle Cell Crisis f/u -see coordinate Pain Management Plan goal.  EW     6/7/2019:  Continued teachings done with rationale:    Drink plenty of fluids- dehydrated can increase your risk of a sickle crisis. Recommendation is about 8 glasses a day and drink more fluid if youre exercising or in hot weather. Sleep--encouraged to get enough sleep. Eat right-plenty of fruits, vegetables, whole grains, and protein--discussed the type meat/proteins she enjoyed; Merpippa Wallacei Encouraged exercise in moderation for her; to aim to increase simply walking more around the house/any mod exercise a week;  Encouraged patient to PCP before starting a new exercise routine--Patient stated PCP has been on her at every visit to increase exercise/activity on a daily basis--discussed physical activity is key in staying healthy. NN warned against trying to  overdo it. Rest when you get tired. Take medicine as ordered. Make sure  take your prescription medicine as directed. Get medical and lab tests that your doctor recommends. Discussion on getting the annual flu shot, and pneumococcal and meningococcal vaccines as recommended by PCP in that common illnesses, like the flu, can quickly become dangerous. Discussion on Extreme temperatures: extreme heat or cold, or any craig changes in temperatures, could set off a crisis. Reminded patient of High altitude--leading to Lack of oxygen at high altitudes could trigger a crisis. (Reminded that Planes, because theyre pressurized, shouldnt be a problem). Alcohol. It can make you dehydrated--patient denied consumptions of;    Smoking. This can trigger a lung condition called acute chest syndrome. This is when sickle cells stick together and block oxygen from getting into your lungs (even warned patient re walking in and out of stores that smokers congregate smoking). Infections. Common illnesses can be very serious for people with SCD. Wash your hands before eating or after using the bathroom. Wash your fruits and veggies, and avoid raw meat, eggs, and unpasteurized milk.    Stress-  Though sometimes hard to avoid, but stress or depression can trigger a crisis (states she has not been depressed as much as usual)--advised to try to take time to relax or find techniques that help you calm down and such to prevent and/or relieve depression episodes. Patient agreed to try. EW      6/14/2019:  [facility] 5/26-6/6/2019:  Sickle Cell Crisis f/u  Patient in for f/u office visit today. 1.  Continued discussion of not being able tto obtain Fentanyl Patch & Hydrocodone simultaneously taken. Discussed the opiod crisis and what were her thoughts on the same. 2.  Supported and discussed PCP concerns for weight gain:   Eating same time each day; avoiding snacks; avoiding consumption of processed carbohydrates. Understanding assured with reiteration of advisement. EW     6/19/2019;  Patient Teach-back done:    Drinking plenty of fluids-verbalized understanding of prevention of increased risk of a sickle crisis. States not 8 glasses yet but more than 5.  .  Sleep--states sleeping better but with medications. .  Eat right- agreed to increased buying of fruits & vegetables, whole grains, and sufficient meats without a lot of fat as protein. .States she is walking more as exercise.  -discussed physical activity is key in staying healthy.  States she is getting more rest.  EW                 goals completion:  6/28/2019

## 2019-06-19 NOTE — PROGRESS NOTES
[facility] 5/26-6/6/2019:  Sickle Cell Crisis f/u      Goals Addressed                 This Visit's Progress     Patient/Family verbalizes understanding of self-management of chronic disease. On track     5/9/2019: Karin Arriaga 4/14-5/2/2019:  Sickle Cell Crisis f/u -see coordinate Pain Management Plan goal.  EW     6/7/2019:  Continued teachings done with rationale:    Drink plenty of fluids- dehydrated can increase your risk of a sickle crisis. Recommendation is about 8 glasses a day and drink more fluid if youre exercising or in hot weather. Sleep--encouraged to get enough sleep. Eat right-plenty of fruits, vegetables, whole grains, and protein--discussed the type meat/proteins she enjoyed; Antonio Santana Encouraged exercise in moderation for her; to aim to increase simply walking more around the house/any mod exercise a week;  Encouraged patient to PCP before starting a new exercise routine--Patient stated PCP has been on her at every visit to increase exercise/activity on a daily basis--discussed physical activity is key in staying healthy. NN warned against trying to  overdo it. Rest when you get tired. Take medicine as ordered. Make sure  take your prescription medicine as directed. Get medical and lab tests that your doctor recommends. Discussion on getting the annual flu shot, and pneumococcal and meningococcal vaccines as recommended by PCP in that common illnesses, like the flu, can quickly become dangerous. Discussion on Extreme temperatures: extreme heat or cold, or any craig changes in temperatures, could set off a crisis. Reminded patient of High altitude--leading to Lack of oxygen at high altitudes could trigger a crisis. (Reminded that Planes, because theyre pressurized, shouldnt be a problem). Alcohol. It can make you dehydrated--patient denied consumptions of;    Smoking. This can trigger a lung condition called acute chest syndrome.  This is when sickle cells stick together and block oxygen from getting into your lungs (even warned patient re walking in and out of stores that smokers congregate smoking). Infections. Common illnesses can be very serious for people with SCD. Wash your hands before eating or after using the bathroom. Wash your fruits and veggies, and avoid raw meat, eggs, and unpasteurized milk. Stress-  Though sometimes hard to avoid, but stress or depression can trigger a crisis (states she has not been depressed as much as usual)--advised to try to take time to relax or find techniques that help you calm down and such to prevent and/or relieve depression episodes. Patient agreed to try. EW      6/14/2019:  [facility] 5/26-6/6/2019:  Sickle Cell Crisis f/u  Patient in for f/u office visit today. 1.  Continued discussion of not being able tto obtain Fentanyl Patch & Hydrocodone simultaneously taken. Discussed the opiod crisis and what were her thoughts on the same. 2.  Supported and discussed PCP concerns for weight gain:   Eating same time each day; avoiding snacks; avoiding consumption of processed carbohydrates. Understanding assured with reiteration of advisement. EW           to be continued.   6/19/2019

## 2019-06-20 NOTE — DISCHARGE SUMMARY
Zuleyka Jerson Trevino    Name:  Maria A Moreno  MR#:  [id number]  :  1960  ACCOUNT #:  [de-identified]  ADMIT DATE:  2019  DISCHARGE DATE:  2019      HISTORY OF PRESENT ILLNESS:  The patient is a 53-year-old lady with SC hemoglobinopathy presented to the emergency room complaining of generalized pain. She was treated aggressively with no improvement and was subsequently admitted for painful sickle crisis. Past medical history, social history, review of systems, family history, physical examination is as in admitting H and P.    LABORATORY VALUES:  Initial hemoglobin was 7.7, white count is 14.3, MCV was 92.9, platelet count was 218,921. At the time of discharge, hemoglobin was 7.8. Abnormalities on the comprehensive profile limited to a bilirubin of 2.3. Urinalysis was normal.  Chest x-ray with atelectasis with scar in the lung bases. HOSPITAL COURSE:  The patient was admitted and placed on Dilaudid 1 mg q. 3 hours. p.r.n. along with her Benadryl 50 mg IV q. 4 hours. p.r.n. and Phenergan 25 mg q. 6 hours. p.r.n. With this regimen along with oxygen and plenty of fluids, she remained in crisis for an extended period of time. She gradually improved. She did not require transfusion. Her analgesic was weaned downward and she was subsequently discharged home. Today, she has declined use of all agents to reduce crisis. FINAL DIAGNOSES:  1. Sickle cell hemoglobinopathy. 2.  Painful sickle crisis. 3.  Primary hypertension. 4.  Depression. DISPOSITION:  1. The patient would be discharged home, ambulatory on a regular diet. 2.  Discharge medications include the following; Cymbalta 10 mg daily. , Benadryl 50 mg q. 4 hours. p.r.n., Duragesic patch 75 mcg q. 3 days if indeed her insurance will pay for it, folic acid 1 mg daily, hydrocodone 10/325 mg one tablet q. 6 hours.  p.r.n., omeprazole 20 mg daily, Phenergan 25 mg q.6h. p.r.n., Temsan 40 mg daily.    3.  The patient will return to the office in the next week. 4.  She remains a full code.       MD ERIK [id number]  D:  06/19/2019 22:24  T:  06/20/2019 1:49  JOB #:  [id number]

## 2019-06-25 ENCOUNTER — PATIENT OUTREACH (OUTPATIENT)
Dept: INTERNAL MEDICINE CLINIC | Age: 59
End: 2019-06-25

## 2019-06-25 ENCOUNTER — OFFICE VISIT (OUTPATIENT)
Dept: INTERNAL MEDICINE CLINIC | Age: 59
End: 2019-06-25

## 2019-06-25 VITALS
TEMPERATURE: 98.8 F | SYSTOLIC BLOOD PRESSURE: 137 MMHG | HEIGHT: 70 IN | WEIGHT: 197.9 LBS | BODY MASS INDEX: 28.33 KG/M2 | HEART RATE: 82 BPM | OXYGEN SATURATION: 98 % | DIASTOLIC BLOOD PRESSURE: 75 MMHG | RESPIRATION RATE: 16 BRPM

## 2019-06-25 DIAGNOSIS — F32.A DEPRESSION, UNSPECIFIED DEPRESSION TYPE: ICD-10-CM

## 2019-06-25 DIAGNOSIS — E66.3 OVERWEIGHT (BMI 25.0-29.9): ICD-10-CM

## 2019-06-25 DIAGNOSIS — D57.1 HB-SS DISEASE WITHOUT CRISIS (HCC): Primary | ICD-10-CM

## 2019-06-25 DIAGNOSIS — I10 ESSENTIAL HYPERTENSION: ICD-10-CM

## 2019-06-25 RX ORDER — HYDROCODONE BITARTRATE AND ACETAMINOPHEN 10; 325 MG/1; MG/1
1 TABLET ORAL
Qty: 120 TAB | Refills: 0 | Status: SHIPPED | OUTPATIENT
Start: 2019-06-25 | End: 2019-07-26 | Stop reason: SDUPTHER

## 2019-06-25 NOTE — PROGRESS NOTES
Chief Complaint   Patient presents with    Medication Evaluation     1. Have you been to the ER, urgent care clinic since your last visit? Hospitalized since your last visit? No    2. Have you seen or consulted any other health care providers outside of the [facility] since your last visit? Include any pap smears or colon screening.  No

## 2019-06-26 NOTE — PROGRESS NOTES
[address] [facility]  [place] [address]  [address] [facility] [address]  Phone:  [phone]  Fax: [phone]       Chief Complaint   Patient presents with    Medication Evaluation   . SUBJECTIVE:    Albertine Paget is a 66 y.o. female Comes in for return visit stating that she has done well. She has not had any major pain that is not responsive to her PO analgesics. She plans to go visit her son next week in [place]. She is concerned about having enough of her analgesic. I explained to her that she can indeed get her Fentanyl patch, but she would have to pay cash. She has a past history of depression, primary hypertension and overweight. Current Outpatient Medications   Medication Sig Dispense Refill    HYDROcodone-acetaminophen (NORCO)  mg tablet Take 1 Tab by mouth every six (6) hours as needed for Pain for up to 30 days. Max Daily Amount: 4 Tabs. Indications: sickl;e cell crisis 120 Tab 0    fentaNYL (DURAGESIC) 75 mcg/hr 1 Patch by TransDERmal route every seventy-two (72) hours for 30 days. Max Daily Amount: 1 Patch. 10 Patch 0    folic acid (FOLVITE) 1 mg tablet Take 1 mg by mouth nightly.  telmisartan (MICARDIS) 40 mg tablet Take 40 mg by mouth nightly.  omeprazole (PRILOSEC OTC) 20 mg tablet Take 20 mg by mouth daily.  promethazine (PHENERGAN) 25 mg tablet Take 1 Tab by mouth every eight (8) hours as needed for Nausea. 90 Tab 11    citalopram (CELEXA) 10 mg tablet TAKE 1 TABLET BY MOUTH EVERY NIGHT AT BEDTIME 90 Tab 3    ibuprofen (MOTRIN) 200 mg tablet Take 400 mg by mouth every six (6) hours as needed for Pain.  diphenhydrAMINE (BENADRYL) 25 mg tablet Take 50 mg by mouth daily as needed (for itching with hydrocodone).  hydrocortisone (CORTISONE) 1 % topical cream Apply  to affected area two (2) times daily as needed for Skin Irritation.  use thin layer       Past Medical History:   Diagnosis Date    Anemia     SC hemoglobinopathy    Chronic pain     Depression     Hypertension     Liver disease     hepatitis C; pt reports \"cured\"    S[address] [address]      Past Surgical History:   Procedure Laterality Date    BREAST SURGERY PROCEDURE UNLISTED      2 cysts removed from R breast    CHEST SURGERY PROCEDURE UNLISTED      status post thor for removal of a benign     HX BREAST BIOPSY Right 05/2007    negative    HX CHOLECYSTECTOMY      HX ORTHOPAEDIC      bony curettage of an ostial myelitis focus     Allergies   Allergen Reactions    Dilaudid [Hydromorphone (Bulk)] Itching     Can tolerate with benadryl and ondansetron    Percocet [Oxycodone-Acetaminophen] Itching         REVIEW OF SYSTEMS:  General: negative for - chills or fever  ENT: negative for - headaches, nasal congestion or tinnitus  Respiratory: negative for - cough, hemoptysis, shortness of breath or wheezing  Cardiovascular : negative for - chest pain, edema, palpitations or shortness of breath  Gastrointestinal: negative for - abdominal pain, blood in stools, heartburn or nausea/vomiting  Genito-Urinary: no dysuria, trouble voiding, or hematuria  Musculoskeletal: negative for - gait disturbance, joint pain, joint stiffness or joint swelling  Neurological: no TIA or stroke symptoms  Hematologic: no bruises, no bleeding, no swollen glands  Integument: no lumps, mole changes, nail changes or rash  Endocrine: no malaise/lethargy or unexpected weight changes      Social History     Socioeconomic History    Marital status:      Spouse name: Not on file    Number of children: 2    Years of education: Not on file    Highest education level: Not on file   Occupational History    Occupation: disabled---Sickle cell disease   Tobacco Use    Smoking status: Never Smoker    Smokeless tobacco: Never Used   Substance and Sexual Activity    Alcohol use: No    Drug use: No    Sexual activity: Yes     Partners: Male     Family History   Problem Relation Age of Onset    Hypertension Mother     Hypertension Father        OBJECTIVE:    Visit Vitals  /75   Pulse 82   Temp 98.8 °F (37.1 °C) (Oral)   Resp 16   Ht 5' 10\" (1.778 m)   Wt 197 lb 14.4 oz (89.8 kg)   SpO2 98%   BMI 28.40 kg/m²     CONSTITUTIONAL: well , well nourished, appears age appropriate  EYES: perrla, eom intact  ENMT:moist mucous membranes, pharynx clear  NECK: supple. Thyroid normal  RESPIRATORY: Chest: clear to ascultation and percussion   CARDIOVASCULAR: Heart: regular rate and rhythm  GASTROINTESTINAL: Abdomen: soft, bowel sounds active  HEMATOLOGIC: no pathological lymph nodes palpated  MUSCULOSKELETAL: Extremities: no edema, pulse 1+   INTEGUMENT: No unusual rashes or suspicious skin lesions noted. Nails appear normal.  NEUROLOGIC: non-focal exam   MENTAL STATUS: alert and oriented, appropriate affect      ASSESSMENT:  1. Hb-SS disease without crisis ([address]    2. Essential hypertension    3. Depression, unspecified depression type    4. Overweight (BMI 25.0-29. 9)        PLAN:    1. Her sickle cell disease appears to be doing reasonably well. She has elected to get the Fentanyl patch to augment her Hydrocodone. I think this is reasonable. Other options are quite limited. 2. BP is excellent today and no adjustments are made. 3. Her depression is reasonably stable. She will continue her antidepressant as prescribed. 4. I strongly advised her to minimize further weight gain. This can be accomplished by eating meals, eliminating snacks and avoiding the consumption of processed carbs. .  Orders Placed This Encounter    HYDROcodone-acetaminophen (NORCO)  mg tablet         Follow-up and Dispositions    · Return in about 2 months (around 8/25/2019).            Naresh Ritchie MD

## 2019-07-01 RX ORDER — TELMISARTAN 40 MG/1
40 TABLET ORAL
Qty: 30 TAB | Refills: 11 | Status: SHIPPED | OUTPATIENT
Start: 2019-07-01 | End: 2020-07-27 | Stop reason: SDUPTHER

## 2019-07-03 ENCOUNTER — PATIENT OUTREACH (OUTPATIENT)
Dept: INTERNAL MEDICINE CLINIC | Age: 59
End: 2019-07-03

## 2019-07-03 NOTE — PROGRESS NOTES
[facility] 5/26-6/6/2019: Sickle Cell Crisis f/u    Patient on weekly Patient Assignment discharge List for f/u contact today 7/3. Undersigned left message on voice mail with my contact information. Need to assess for continued discharge needs. Patient had informed that she would be on vacation this week. Will continue to f/u.       Chart Review:  Patient contact to PCP on My Chart for medication refill-telmisartan (MICARDIS) 40 mg tablet  on 7/1/2019:

## 2019-07-12 ENCOUNTER — PATIENT OUTREACH (OUTPATIENT)
Dept: INTERNAL MEDICINE CLINIC | Age: 59
End: 2019-07-12

## 2019-07-26 ENCOUNTER — PATIENT OUTREACH (OUTPATIENT)
Dept: INTERNAL MEDICINE CLINIC | Age: 59
End: 2019-07-26

## 2019-07-26 ENCOUNTER — OFFICE VISIT (OUTPATIENT)
Dept: INTERNAL MEDICINE CLINIC | Age: 59
End: 2019-07-26

## 2019-07-26 VITALS
TEMPERATURE: 98.8 F | BODY MASS INDEX: 28.6 KG/M2 | HEIGHT: 70 IN | WEIGHT: 199.8 LBS | RESPIRATION RATE: 18 BRPM | DIASTOLIC BLOOD PRESSURE: 99 MMHG | HEART RATE: 81 BPM | SYSTOLIC BLOOD PRESSURE: 173 MMHG | OXYGEN SATURATION: 98 %

## 2019-07-26 DIAGNOSIS — E66.3 OVERWEIGHT (BMI 25.0-29.9): ICD-10-CM

## 2019-07-26 DIAGNOSIS — I10 ESSENTIAL HYPERTENSION: ICD-10-CM

## 2019-07-26 DIAGNOSIS — D57.1 HB-SS DISEASE WITHOUT CRISIS (HCC): Primary | ICD-10-CM

## 2019-07-26 DIAGNOSIS — F32.A DEPRESSION, UNSPECIFIED DEPRESSION TYPE: ICD-10-CM

## 2019-07-26 DIAGNOSIS — L30.9 DERMATITIS: ICD-10-CM

## 2019-07-26 RX ORDER — HYDROCODONE BITARTRATE AND ACETAMINOPHEN 10; 325 MG/1; MG/1
1 TABLET ORAL
Qty: 120 TAB | Refills: 0 | Status: ON HOLD | OUTPATIENT
Start: 2019-07-26 | End: 2019-08-08 | Stop reason: SDUPTHER

## 2019-07-26 RX ORDER — HYDROCORTISONE 1 %
CREAM (GRAM) TOPICAL
Qty: 30 G | Refills: 11 | Status: SHIPPED | OUTPATIENT
Start: 2019-07-26 | End: 2021-02-08 | Stop reason: SDUPTHER

## 2019-07-26 NOTE — PROGRESS NOTES
Chief Complaint   Patient presents with    Hypertension     1. Have you been to the ER, urgent care clinic since your last visit? Hospitalized since your last visit? No    2. Have you seen or consulted any other health care providers outside of the [address] [place] [address] since your last visit? Include any pap smears or colon screening.  No

## 2019-07-26 NOTE — PROGRESS NOTES
[address] [facility]  [place] [address]  [address] [place] [address]  Phone:  [phone]  Fax: [phone]       Chief Complaint   Patient presents with    Hypertension   . SUBJECTIVE:    Magalys Devi is a 66 y.o. female Comes in for a return visit stating that she is doing reasonably well. She has had some intermittent pain but it is controllable. She was able to visit her son without any adverse problems. Her mood has been quite upbeat. She has a past history of primary hypertension and mild venous insufficiency. Current Outpatient Medications   Medication Sig Dispense Refill    HYDROcodone-acetaminophen (NORCO)  mg tablet Take 1 Tab by mouth every six (6) hours as needed for Pain for up to 30 days. Max Daily Amount: 4 Tabs. Indications: sickl;e cell crisis 120 Tab 0    hydrocortisone (CORTISONE) 1 % topical cream Apply  to affected area two (2) times daily as needed for Skin Irritation. use thin layer 30 g 11    telmisartan (MICARDIS) 40 mg tablet Take 1 Tab by mouth nightly. 30 Tab 11    folic acid (FOLVITE) 1 mg tablet Take 1 mg by mouth nightly.  omeprazole (PRILOSEC OTC) 20 mg tablet Take 20 mg by mouth daily.  promethazine (PHENERGAN) 25 mg tablet Take 1 Tab by mouth every eight (8) hours as needed for Nausea. 90 Tab 11    citalopram (CELEXA) 10 mg tablet TAKE 1 TABLET BY MOUTH EVERY NIGHT AT BEDTIME 90 Tab 3    ibuprofen (MOTRIN) 200 mg tablet Take 400 mg by mouth every six (6) hours as needed for Pain.  diphenhydrAMINE (BENADRYL) 25 mg tablet Take 50 mg by mouth daily as needed (for itching with hydrocodone).        Facility-Administered Medications Ordered in Other Visits   Medication Dose Route Frequency Provider Last Rate Last Dose    0.9% sodium chloride infusion 1,000 mL  1,000 mL IntraVENous CONTINUOUS Sruthi OWUSU MD   1,000 mL at 07/27/19 1033     Past Medical History:   Diagnosis Date    Anemia     SC hemoglobinopathy    Chronic pain     Depression     Hypertension     Liver disease     hepatitis C; pt reports \"cured\"    Sickle cell disease ([facility] [address]      Past Surgical History:   Procedure Laterality Date    BREAST SURGERY PROCEDURE UNLISTED      2 cysts removed from R breast    CHEST SURGERY PROCEDURE UNLISTED      status post thor for removal of a benign     HX BREAST BIOPSY Right 05/2007    negative    HX CHOLECYSTECTOMY      HX ORTHOPAEDIC      bony curettage of an ostial myelitis focus     Allergies   Allergen Reactions    Dilaudid [Hydromorphone (Bulk)] Itching     Can tolerate with benadryl and ondansetron    Percocet [Oxycodone-Acetaminophen] Itching         REVIEW OF SYSTEMS:  General: negative for - chills or fever  ENT: negative for - headaches, nasal congestion or tinnitus  Respiratory: negative for - cough, hemoptysis, shortness of breath or wheezing  Cardiovascular : negative for - chest pain, edema, palpitations or shortness of breath  Gastrointestinal: negative for - abdominal pain, blood in stools, heartburn or nausea/vomiting  Genito-Urinary: no dysuria, trouble voiding, or hematuria  Musculoskeletal: negative for - gait disturbance, joint pain, joint stiffness or joint swelling  Neurological: no TIA or stroke symptoms  Hematologic: no bruises, no bleeding, no swollen glands  Integument: no lumps, mole changes, nail changes or rash  Endocrine: no malaise/lethargy or unexpected weight changes      Social History     Socioeconomic History    Marital status:      Spouse name: Not on file    Number of children: 2    Years of education: Not on file    Highest education level: Not on file   Occupational History    Occupation: disabled---Sickle cell disease   Tobacco Use    Smoking status: Never Smoker    Smokeless tobacco: Never Used   Substance and Sexual Activity    Alcohol use: No    Drug use: No    Sexual activity: Yes     Partners: Male     Family History   Problem Relation Age of Onset    Hypertension Mother     Hypertension Father        OBJECTIVE:    Visit Vitals  BP (!) 173/99   Pulse 81   Temp 98.8 °F (37.1 °C) (Oral)   Resp 18   Ht 5' 10\" (1.778 m)   Wt 199 lb 12.8 oz (90.6 kg)   SpO2 98%   BMI 28.67 kg/m²     CONSTITUTIONAL: well , well nourished, appears age appropriate  EYES: perrla, eom intact  ENMT:moist mucous membranes, pharynx clear  NECK: supple. Thyroid normal  RESPIRATORY: Chest: clear to ascultation and percussion   CARDIOVASCULAR: Heart: regular rate and rhythm  GASTROINTESTINAL: Abdomen: soft, bowel sounds active  HEMATOLOGIC: no pathological lymph nodes palpated  MUSCULOSKELETAL: Extremities: no edema, pulse 1+   INTEGUMENT: No unusual rashes or suspicious skin lesions noted. Nails appear normal.  NEUROLOGIC: non-focal exam   MENTAL STATUS: alert and oriented, appropriate affect      ASSESSMENT:  1. Hb-SS disease without crisis ([address]    2. Depression, unspecified depression type    3. Essential hypertension    4. Overweight (BMI 25.0-29.9)    5. Dermatitis        PLAN:  1. Her sickle cell disease appears to be reasonably stable. She will continue her hydrocortisone as prescribed. Hopefully she will not require hospitalization soon. 2. Her depression continues to improve. She is much more upbeat currently. 3. Her blood pressure is excellent. No adjustments are made. 4. I encouraged weight reduction. This can occur by eating meals, eliminating snacks, and avoiding the consumption of processed carbohydrates. .  Orders Placed This Encounter    HYDROcodone-acetaminophen (NORCO)  mg tablet    hydrocortisone (CORTISONE) 1 % topical cream         Follow-up and Dispositions    · Return in about 3 months (around 10/26/2019).            Michelle Abdullahi MD

## 2019-07-27 ENCOUNTER — APPOINTMENT (OUTPATIENT)
Dept: GENERAL RADIOLOGY | Age: 59
DRG: 812 | End: 2019-07-27
Attending: EMERGENCY MEDICINE
Payer: MEDICARE

## 2019-07-27 ENCOUNTER — HOSPITAL ENCOUNTER (INPATIENT)
Age: 59
LOS: 10 days | Discharge: HOME | DRG: 812 | End: 2019-08-08
Attending: EMERGENCY MEDICINE | Admitting: INTERNAL MEDICINE
Payer: MEDICARE

## 2019-07-27 DIAGNOSIS — D57.00 SICKLE CELL CRISIS (HCC): Primary | ICD-10-CM

## 2019-07-27 DIAGNOSIS — D57.1 HB-SS DISEASE WITHOUT CRISIS (HCC): ICD-10-CM

## 2019-07-27 LAB
ANION GAP SERPL CALC-SCNC: 7 MMOL/L (ref 5–15)
BASOPHILS # BLD: 0 K/UL (ref 0–0.1)
BASOPHILS NFR BLD: 1 % (ref 0–1)
BUN SERPL-MCNC: 10 MG/DL (ref 6–20)
BUN/CREAT SERPL: 13 (ref 12–20)
CALCIUM SERPL-MCNC: 8.5 MG/DL (ref 8.5–10.1)
CHLORIDE SERPL-SCNC: 111 MMOL/L (ref 97–108)
CO2 SERPL-SCNC: 25 MMOL/L (ref 21–32)
CREAT SERPL-MCNC: 0.77 MG/DL (ref 0.55–1.02)
DIFFERENTIAL METHOD BLD: ABNORMAL
EOSINOPHIL # BLD: 0.3 K/UL (ref 0–0.4)
EOSINOPHIL NFR BLD: 6 % (ref 0–7)
ERYTHROCYTE [DISTWIDTH] IN BLOOD BY AUTOMATED COUNT: 14.1 % (ref 11.5–14.5)
GLUCOSE SERPL-MCNC: 92 MG/DL (ref 65–100)
HCT VFR BLD AUTO: 26 % (ref 35–47)
HGB BLD-MCNC: 9 G/DL (ref 11.5–16)
IMM GRANULOCYTES # BLD AUTO: 0 K/UL (ref 0–0.04)
IMM GRANULOCYTES NFR BLD AUTO: 0 % (ref 0–0.5)
LYMPHOCYTES # BLD: 2.5 K/UL (ref 0.8–3.5)
LYMPHOCYTES NFR BLD: 44 % (ref 12–49)
MCH RBC QN AUTO: 33 PG (ref 26–34)
MCHC RBC AUTO-ENTMCNC: 34.6 G/DL (ref 30–36.5)
MCV RBC AUTO: 95.2 FL (ref 80–99)
MONOCYTES # BLD: 0.9 K/UL (ref 0–1)
MONOCYTES NFR BLD: 15 % (ref 5–13)
NEUTS SEG # BLD: 2 K/UL (ref 1.8–8)
NEUTS SEG NFR BLD: 34 % (ref 32–75)
NRBC # BLD: 0.03 K/UL (ref 0–0.01)
NRBC BLD-RTO: 0.5 PER 100 WBC
PLATELET # BLD AUTO: 248 K/UL (ref 150–400)
PMV BLD AUTO: 11.6 FL (ref 8.9–12.9)
POTASSIUM SERPL-SCNC: 3.5 MMOL/L (ref 3.5–5.1)
RBC # BLD AUTO: 2.73 M/UL (ref 3.8–5.2)
RETICS # AUTO: 0.1 M/UL (ref 0.02–0.08)
RETICS/RBC NFR AUTO: 3.8 % (ref 0.7–2.1)
SODIUM SERPL-SCNC: 143 MMOL/L (ref 136–145)
WBC # BLD AUTO: 5.7 K/UL (ref 3.6–11)

## 2019-07-27 PROCEDURE — 99218 HC RM OBSERVATION: CPT

## 2019-07-27 PROCEDURE — 96361 HYDRATE IV INFUSION ADD-ON: CPT

## 2019-07-27 PROCEDURE — 71046 X-RAY EXAM CHEST 2 VIEWS: CPT

## 2019-07-27 PROCEDURE — 96374 THER/PROPH/DIAG INJ IV PUSH: CPT

## 2019-07-27 PROCEDURE — 85025 COMPLETE CBC W/AUTO DIFF WBC: CPT

## 2019-07-27 PROCEDURE — 80048 BASIC METABOLIC PNL TOTAL CA: CPT

## 2019-07-27 PROCEDURE — 96375 TX/PRO/DX INJ NEW DRUG ADDON: CPT

## 2019-07-27 PROCEDURE — 99285 EMERGENCY DEPT VISIT HI MDM: CPT

## 2019-07-27 PROCEDURE — 74011000258 HC RX REV CODE- 258: Performed by: HOSPITALIST

## 2019-07-27 PROCEDURE — 74011250637 HC RX REV CODE- 250/637: Performed by: HOSPITALIST

## 2019-07-27 PROCEDURE — 36415 COLL VENOUS BLD VENIPUNCTURE: CPT

## 2019-07-27 PROCEDURE — 74011250636 HC RX REV CODE- 250/636: Performed by: EMERGENCY MEDICINE

## 2019-07-27 PROCEDURE — 96376 TX/PRO/DX INJ SAME DRUG ADON: CPT

## 2019-07-27 PROCEDURE — 74011250637 HC RX REV CODE- 250/637: Performed by: EMERGENCY MEDICINE

## 2019-07-27 PROCEDURE — 74011250636 HC RX REV CODE- 250/636: Performed by: HOSPITALIST

## 2019-07-27 PROCEDURE — 85045 AUTOMATED RETICULOCYTE COUNT: CPT

## 2019-07-27 PROCEDURE — 93005 ELECTROCARDIOGRAM TRACING: CPT

## 2019-07-27 RX ORDER — CITALOPRAM 20 MG/1
10 TABLET, FILM COATED ORAL
Status: DISCONTINUED | OUTPATIENT
Start: 2019-07-27 | End: 2019-08-08 | Stop reason: HOSPADM

## 2019-07-27 RX ORDER — HYDROMORPHONE HYDROCHLORIDE 1 MG/ML
1 INJECTION, SOLUTION INTRAMUSCULAR; INTRAVENOUS; SUBCUTANEOUS
Status: DISCONTINUED | OUTPATIENT
Start: 2019-07-27 | End: 2019-07-27

## 2019-07-27 RX ORDER — HYDROMORPHONE HYDROCHLORIDE 1 MG/ML
0.5 INJECTION, SOLUTION INTRAMUSCULAR; INTRAVENOUS; SUBCUTANEOUS
Status: DISCONTINUED | OUTPATIENT
Start: 2019-07-27 | End: 2019-08-02

## 2019-07-27 RX ORDER — LOSARTAN POTASSIUM 50 MG/1
50 TABLET ORAL
Status: DISCONTINUED | OUTPATIENT
Start: 2019-07-27 | End: 2019-08-08 | Stop reason: HOSPADM

## 2019-07-27 RX ORDER — IBUPROFEN 400 MG/1
400 TABLET ORAL
Status: DISCONTINUED | OUTPATIENT
Start: 2019-07-27 | End: 2019-08-08 | Stop reason: HOSPADM

## 2019-07-27 RX ORDER — TELMISARTAN 40 MG/1
40 TABLET ORAL
Status: DISCONTINUED | OUTPATIENT
Start: 2019-07-27 | End: 2019-07-27 | Stop reason: CLARIF

## 2019-07-27 RX ORDER — HYDROCODONE BITARTRATE AND ACETAMINOPHEN 5; 325 MG/1; MG/1
1 TABLET ORAL
Status: COMPLETED | OUTPATIENT
Start: 2019-07-27 | End: 2019-07-27

## 2019-07-27 RX ORDER — SODIUM CHLORIDE 0.9 % (FLUSH) 0.9 %
5-40 SYRINGE (ML) INJECTION EVERY 8 HOURS
Status: DISCONTINUED | OUTPATIENT
Start: 2019-07-27 | End: 2019-08-08 | Stop reason: HOSPADM

## 2019-07-27 RX ORDER — HYDROMORPHONE HYDROCHLORIDE 1 MG/ML
1 INJECTION, SOLUTION INTRAMUSCULAR; INTRAVENOUS; SUBCUTANEOUS ONCE
Status: COMPLETED | OUTPATIENT
Start: 2019-07-27 | End: 2019-07-27

## 2019-07-27 RX ORDER — SODIUM CHLORIDE 9 MG/ML
1000 INJECTION, SOLUTION INTRAVENOUS CONTINUOUS
Status: DISCONTINUED | OUTPATIENT
Start: 2019-07-27 | End: 2019-07-27

## 2019-07-27 RX ORDER — ONDANSETRON 2 MG/ML
4 INJECTION INTRAMUSCULAR; INTRAVENOUS
Status: DISCONTINUED | OUTPATIENT
Start: 2019-07-27 | End: 2019-08-08 | Stop reason: HOSPADM

## 2019-07-27 RX ORDER — DIPHENHYDRAMINE HYDROCHLORIDE 50 MG/ML
25 INJECTION, SOLUTION INTRAMUSCULAR; INTRAVENOUS
Status: COMPLETED | OUTPATIENT
Start: 2019-07-27 | End: 2019-07-27

## 2019-07-27 RX ORDER — POLYETHYLENE GLYCOL 3350 17 G/17G
17 POWDER, FOR SOLUTION ORAL
Status: DISCONTINUED | OUTPATIENT
Start: 2019-07-27 | End: 2019-08-08 | Stop reason: HOSPADM

## 2019-07-27 RX ORDER — FENTANYL 100 UG/H
1 PATCH TRANSDERMAL
Status: DISCONTINUED | OUTPATIENT
Start: 2019-07-29 | End: 2019-07-28

## 2019-07-27 RX ORDER — NALOXONE HYDROCHLORIDE 0.4 MG/ML
0.4 INJECTION, SOLUTION INTRAMUSCULAR; INTRAVENOUS; SUBCUTANEOUS AS NEEDED
Status: DISCONTINUED | OUTPATIENT
Start: 2019-07-27 | End: 2019-08-08 | Stop reason: HOSPADM

## 2019-07-27 RX ORDER — DEXTROSE MONOHYDRATE AND SODIUM CHLORIDE 5; .45 G/100ML; G/100ML
125 INJECTION, SOLUTION INTRAVENOUS CONTINUOUS
Status: DISCONTINUED | OUTPATIENT
Start: 2019-07-27 | End: 2019-07-28

## 2019-07-27 RX ORDER — ACETAMINOPHEN 325 MG/1
650 TABLET ORAL
Status: DISCONTINUED | OUTPATIENT
Start: 2019-07-27 | End: 2019-08-08 | Stop reason: HOSPADM

## 2019-07-27 RX ORDER — FENTANYL 100 UG/H
1 PATCH TRANSDERMAL
Status: DISCONTINUED | OUTPATIENT
Start: 2019-07-27 | End: 2019-07-27 | Stop reason: SDUPTHER

## 2019-07-27 RX ORDER — ONDANSETRON 2 MG/ML
4 INJECTION INTRAMUSCULAR; INTRAVENOUS
Status: DISCONTINUED | OUTPATIENT
Start: 2019-07-27 | End: 2019-07-27

## 2019-07-27 RX ORDER — FENTANYL 100 UG/H
1 PATCH TRANSDERMAL
COMMUNITY
End: 2019-09-06

## 2019-07-27 RX ORDER — KETOROLAC TROMETHAMINE 30 MG/ML
15 INJECTION, SOLUTION INTRAMUSCULAR; INTRAVENOUS ONCE
Status: COMPLETED | OUTPATIENT
Start: 2019-07-27 | End: 2019-07-27

## 2019-07-27 RX ORDER — SODIUM CHLORIDE 0.9 % (FLUSH) 0.9 %
5-40 SYRINGE (ML) INJECTION AS NEEDED
Status: DISCONTINUED | OUTPATIENT
Start: 2019-07-27 | End: 2019-08-08 | Stop reason: HOSPADM

## 2019-07-27 RX ORDER — PANTOPRAZOLE SODIUM 40 MG/1
40 TABLET, DELAYED RELEASE ORAL
Status: DISCONTINUED | OUTPATIENT
Start: 2019-07-28 | End: 2019-08-08 | Stop reason: HOSPADM

## 2019-07-27 RX ORDER — DIPHENHYDRAMINE HYDROCHLORIDE 50 MG/ML
25 INJECTION, SOLUTION INTRAMUSCULAR; INTRAVENOUS
Status: DISCONTINUED | OUTPATIENT
Start: 2019-07-27 | End: 2019-08-08 | Stop reason: HOSPADM

## 2019-07-27 RX ORDER — DOCUSATE SODIUM 100 MG/1
100 CAPSULE, LIQUID FILLED ORAL 2 TIMES DAILY
Status: DISCONTINUED | OUTPATIENT
Start: 2019-07-27 | End: 2019-08-08 | Stop reason: HOSPADM

## 2019-07-27 RX ORDER — FOLIC ACID 1 MG/1
1 TABLET ORAL
Status: DISCONTINUED | OUTPATIENT
Start: 2019-07-27 | End: 2019-08-08 | Stop reason: HOSPADM

## 2019-07-27 RX ADMIN — CITALOPRAM HYDROBROMIDE 10 MG: 20 TABLET ORAL at 20:45

## 2019-07-27 RX ADMIN — HYDROCODONE BITARTRATE AND ACETAMINOPHEN 1 TABLET: 5; 325 TABLET ORAL at 12:14

## 2019-07-27 RX ADMIN — DEXTROSE MONOHYDRATE AND SODIUM CHLORIDE 125 ML/HR: 5; .45 INJECTION, SOLUTION INTRAVENOUS at 16:16

## 2019-07-27 RX ADMIN — Medication 10 ML: at 20:45

## 2019-07-27 RX ADMIN — HYDROMORPHONE HYDROCHLORIDE 0.5 MG: 1 INJECTION, SOLUTION INTRAMUSCULAR; INTRAVENOUS; SUBCUTANEOUS at 23:42

## 2019-07-27 RX ADMIN — ONDANSETRON 4 MG: 2 INJECTION INTRAMUSCULAR; INTRAVENOUS at 23:42

## 2019-07-27 RX ADMIN — DIPHENHYDRAMINE HYDROCHLORIDE 25 MG: 50 INJECTION, SOLUTION INTRAMUSCULAR; INTRAVENOUS at 17:41

## 2019-07-27 RX ADMIN — DIPHENHYDRAMINE HYDROCHLORIDE 25 MG: 50 INJECTION, SOLUTION INTRAMUSCULAR; INTRAVENOUS at 23:42

## 2019-07-27 RX ADMIN — HYDROMORPHONE HYDROCHLORIDE 0.5 MG: 1 INJECTION, SOLUTION INTRAMUSCULAR; INTRAVENOUS; SUBCUTANEOUS at 20:44

## 2019-07-27 RX ADMIN — LOSARTAN POTASSIUM 50 MG: 50 TABLET ORAL at 20:46

## 2019-07-27 RX ADMIN — FOLIC ACID 1 MG: 1 TABLET ORAL at 20:45

## 2019-07-27 RX ADMIN — DIPHENHYDRAMINE HYDROCHLORIDE 25 MG: 50 INJECTION, SOLUTION INTRAMUSCULAR; INTRAVENOUS at 09:19

## 2019-07-27 RX ADMIN — HYDROMORPHONE HYDROCHLORIDE 1 MG: 1 INJECTION, SOLUTION INTRAMUSCULAR; INTRAVENOUS; SUBCUTANEOUS at 09:18

## 2019-07-27 RX ADMIN — ONDANSETRON 4 MG: 2 INJECTION INTRAMUSCULAR; INTRAVENOUS at 17:41

## 2019-07-27 RX ADMIN — KETOROLAC TROMETHAMINE 15 MG: 30 INJECTION, SOLUTION INTRAMUSCULAR at 12:14

## 2019-07-27 RX ADMIN — DOCUSATE SODIUM 100 MG: 100 CAPSULE, LIQUID FILLED ORAL at 17:41

## 2019-07-27 RX ADMIN — HYDROMORPHONE HYDROCHLORIDE 0.5 MG: 1 INJECTION, SOLUTION INTRAMUSCULAR; INTRAVENOUS; SUBCUTANEOUS at 17:41

## 2019-07-27 RX ADMIN — SODIUM CHLORIDE 1000 ML: 900 INJECTION, SOLUTION INTRAVENOUS at 10:33

## 2019-07-27 RX ADMIN — Medication 10 ML: at 17:41

## 2019-07-27 RX ADMIN — HYDROMORPHONE HYDROCHLORIDE 1 MG: 1 INJECTION, SOLUTION INTRAMUSCULAR; INTRAVENOUS; SUBCUTANEOUS at 12:14

## 2019-07-27 NOTE — ED PROVIDER NOTES
EMERGENCY DEPARTMENT HISTORY AND PHYSICAL EXAM      Date: 7/27/2019  Patient Name: Vira Catalan    History of Presenting Illness     Chief Complaint   Patient presents with    Sickle Cell Crisis     pt reports pain to bilateral elbows and groin area onset yesterday, states fentanyl patch to L abdomen that she placed yesterday       History Provided By: Patient    HPI: Vira Catalan, 66 y.o. female  presents to the ED with cc of sickle cell crisis. Patient has had 2 days of pain. She localizes it to bilateral elbows into the groin area. She states this is normally where she does get her crisis pain. She applied a fentanyl patch yesterday. She has not had any fevers or chills. She denies chest pain or shortness of breath. No nausea vomiting or diarrhea. No urinary symptoms such as frequency or dysuria. She states she normally does have to have Benadryl and Dilaudid for her pain crises. There are no other complaints, changes, or physical findings at this time. PCP: Yasmin Gale MD    No current facility-administered medications on file prior to encounter. Current Outpatient Medications on File Prior to Encounter   Medication Sig Dispense Refill    fentaNYL (DURAGESIC) 100 mcg/hr PATCH 1 Patch by TransDERmal route every seventy-two (72) hours.  HYDROcodone-acetaminophen (NORCO)  mg tablet Take 1 Tab by mouth every six (6) hours as needed for Pain for up to 30 days. Max Daily Amount: 4 Tabs. Indications: sickl;e cell crisis 120 Tab 0    hydrocortisone (CORTISONE) 1 % topical cream Apply  to affected area two (2) times daily as needed for Skin Irritation. use thin layer 30 g 11    telmisartan (MICARDIS) 40 mg tablet Take 1 Tab by mouth nightly. 30 Tab 11    folic acid (FOLVITE) 1 mg tablet Take 1 mg by mouth nightly.  omeprazole (PRILOSEC OTC) 20 mg tablet Take 20 mg by mouth nightly.       promethazine (PHENERGAN) 25 mg tablet Take 1 Tab by mouth every eight (8) hours as needed for Nausea. 90 Tab 11    citalopram (CELEXA) 10 mg tablet TAKE 1 TABLET BY MOUTH EVERY NIGHT AT BEDTIME 90 Tab 3    ibuprofen (MOTRIN) 200 mg tablet Take 400 mg by mouth every six (6) hours as needed for Pain.  diphenhydrAMINE (BENADRYL) 25 mg tablet Take 50 mg by mouth daily as needed (for itching with hydrocodone). Past History     Past Medical History:  Past Medical History:   Diagnosis Date    Anemia     SC hemoglobinopathy    Chronic pain     Depression     Hypertension     Liver disease     hepatitis C; pt reports \"cured\"    Sickle cell disease ([facility])        Past Surgical History:  Past Surgical History:   Procedure Laterality Date    BREAST SURGERY PROCEDURE UNLISTED      2 cysts removed from R breast    CHEST SURGERY PROCEDURE UNLISTED      status post thor for removal of a benign     HX BREAST BIOPSY Right 05/2007    negative    HX CHOLECYSTECTOMY      HX ORTHOPAEDIC      bony curettage of an ostial myelitis focus       Family History:  Family History   Problem Relation Age of Onset    Hypertension Mother     Hypertension Father        Social History:  Social History     Tobacco Use    Smoking status: Never Smoker    Smokeless tobacco: Never Used   Substance Use Topics    Alcohol use: No    Drug use: No       Allergies: Allergies   Allergen Reactions    Dilaudid [Hydromorphone (Bulk)] Itching     Can tolerate with benadryl and ondansetron    Percocet [Oxycodone-Acetaminophen] Itching         Review of Systems   Review of Systems   Constitutional: Negative for chills and fever. HENT: Negative for congestion, ear pain, rhinorrhea, sore throat and trouble swallowing. Eyes: Negative for visual disturbance. Respiratory: Negative for cough, chest tightness and shortness of breath. Cardiovascular: Negative for chest pain and palpitations. Gastrointestinal: Negative for abdominal pain, blood in stool, constipation, diarrhea, nausea and vomiting.    Genitourinary: Negative for decreased urine volume, difficulty urinating, dysuria and frequency. Musculoskeletal: Positive for arthralgias. Negative for back pain and neck pain. Skin: Negative for color change and rash. Neurological: Negative for dizziness, weakness, light-headedness and headaches. Physical Exam   Physical Exam   Constitutional: She is oriented to person, place, and time. She appears well-developed and well-nourished. She does not appear ill. No distress. HENT:   Mouth/Throat: Oropharynx is clear and moist.   Eyes: Conjunctivae are normal.   Neck: Neck supple. Cardiovascular: Normal rate and regular rhythm. Pulmonary/Chest: Effort normal and breath sounds normal. No accessory muscle usage. No respiratory distress. Abdominal: Soft. She exhibits no distension. There is no tenderness. Lymphadenopathy:     She has no cervical adenopathy. Neurological: She is alert and oriented to person, place, and time. She has normal strength. No cranial nerve deficit or sensory deficit. Skin: Skin is warm and dry. Nursing note and vitals reviewed. Diagnostic Study Results     Labs -     Recent Results (from the past 24 hour(s))   EKG, 12 LEAD, INITIAL    Collection Time: 07/27/19  2:47 PM   Result Value Ref Range    Ventricular Rate 73 BPM    Atrial Rate 73 BPM    P-R Interval 120 ms    QRS Duration 80 ms    Q-T Interval 402 ms    QTC Calculation (Bezet) 442 ms    Calculated P Axis 41 degrees    Calculated R Axis 19 degrees    Calculated T Axis 14 degrees    Diagnosis       Normal sinus rhythm  Increased R/S ratio in V1, consider early transition or posterior infarct  When compared with ECG of 10-FEB-2019 09:23,  Vent.  rate has decreased BY  38 BPM     CBC W/O DIFF    Collection Time: 07/28/19  2:47 AM   Result Value Ref Range    WBC 9.5 3.6 - 11.0 K/uL    RBC 2.72 (L) 3.80 - 5.20 M/uL    HGB 8.8 (L) 11.5 - 16.0 g/dL    HCT 25.8 (L) 35.0 - 47.0 %    MCV 94.9 80.0 - 99.0 FL    MCH 32.4 26.0 - 34.0 PG MCHC 34.1 30.0 - 36.5 g/dL    RDW 14.0 11.5 - 14.5 %    PLATELET 586 774 - 085 K/uL    MPV 11.3 8.9 - 12.9 FL    NRBC 0.3 (H) 0  WBC    ABSOLUTE NRBC 0.03 (H) 0.00 - 0.01 K/uL       Radiologic Studies -   XR CHEST PA LAT   Final Result        CT Results  (Last 48 hours)    None        CXR Results  (Last 48 hours)               07/27/19 1438  XR CHEST PA LAT Final result    Impression:  IMPRESSION: No significant interval change. Narrative:  CLINICAL HISTORY: Chest pain    INDICATION: Chest pain       COMPARISON: 5/26/2019       FINDINGS:    PA and lateral views of the chest are obtained. The cardiopericardial silhouette is within normal limits. There is no pleural   effusion, pneumothorax or focal consolidation present. Severe degenerative   change of the glenohumeral joint. Minimal atelectasis on the right. Mild   scoliotic change. Chronic compression deformities in the midthoracic spine. Medical Decision Making   I am the first provider for this patient. I reviewed the vital signs, available nursing notes, past medical history, past surgical history, family history and social history. Vital Signs-Reviewed the patient's vital signs.   Patient Vitals for the past 24 hrs:   Temp Pulse Resp BP SpO2   07/28/19 0723 99.3 °F (37.4 °C) 75 16 140/84 99 %   07/28/19 0255 98.1 °F (36.7 °C) 70 14 111/52 97 %   07/27/19 2341 98.7 °F (37.1 °C) 76 16 125/71 99 %   07/27/19 1848 98.4 °F (36.9 °C) 79 16 138/76 99 %   07/27/19 1652 98.5 °F (36.9 °C) 72 16 152/76 100 %   07/27/19 1610 98.2 °F (36.8 °C) 70 15 142/86 98 %   07/27/19 1530    (!) 146/100 98 %   07/27/19 1500    145/86 97 %   07/27/19 1441 98.6 °F (37 °C) 78 18 158/89    07/27/19 1400    (!) 140/99 99 %   07/27/19 1330    132/70 97 %   07/27/19 1300    130/77 97 %   07/27/19 1230  75 18 148/77 96 %   07/27/19 1200  75  149/86 97 %   07/27/19 1030  72  (!) 156/93 98 %   07/27/19 1000  74  150/87 98 % 07/27/19 0921  75  149/85        Pulse Oximetry Analysis - 100% on RA    EKG interpretation: (Preliminary)  Rhythm: normal sinus rhythm; and regular . Rate (approx.): 73; Axis: normal; AR interval: normal; QRS interval: normal ; ST/T wave: non-specific changes. Records Reviewed: Nursing Notes and Old Medical Records    Provider Notes (Medical Decision Making):   Patient with a history of sickle cell disease presents with pain crisis. Pain started in her usual locations, however in the emergency department progressed to her lower extremities which she has not had pain there before. No signs of acute chest syndrome. She is not anemic or needing blood transfusion. Pain was controlled only moderately in the emergency department. Given progression of her pain to the new areas she has not had before I would recommend admission to the hospitalist.    ED Course:   Initial assessment performed. The patients presenting problems have been discussed, and they are in agreement with the care plan formulated and outlined with them. I have encouraged them to ask questions as they arise throughout their visit. ED Course as of Jul 28 0918   Sat Jul 27, 2019   1403 Patient is reporting that her pain is easing up however the location of her pain is spreading. When she initially came in the pain was only in her elbows and groin area.   She states the pain has now radiated and is involving her shoulders, ribs and feet.    [WM]      ED Course User Index  [WM] Nicol Palafox MD       Orders Placed This Encounter    XR CHEST PA LAT    CBC WITH AUTOMATED DIFF    BASIC METABOLIC PANEL    RETICULOCYTE COUNT    CBC W/O DIFF    DIET CARDIAC Regular    NOTIFY PROVIDER: SPECIFY Notify provider on pt's arrival to floor ONE TIME STAT    OUT OF BED WITH ASSISTANCE    VITAL SIGNS PER UNIT ROUTINE    Cardiac Monitor    VITAL SIGNS PER UNIT ROUTINE    UP AD SORAIDA    NOTIFY PROVIDER: [facility] OUTPUT    APPLY/MAINTAIN SEQUENTIAL COMPRESSION DEVICE    FULL CODE    IP CONSULT TO PRIMARY CARE PROVIDER    OXIMETRY, SPOT CHECK    EKG, 12 LEAD, INITIAL    SALINE LOCK IV ONE TIME STAT    diphenhydrAMINE (BENADRYL) injection 25 mg    HYDROmorphone (PF) (DILAUDID) injection 1 mg    DISCONTD: 0.9% sodium chloride infusion 1,000 mL    HYDROmorphone (PF) (DILAUDID) injection 1 mg    HYDROcodone-acetaminophen (NORCO) 5-325 mg per tablet 1 Tab    ketorolac (TORADOL) injection 15 mg    DISCONTD: ondansetron (ZOFRAN) injection 4 mg    sodium chloride (NS) flush 5-40 mL    sodium chloride (NS) flush 5-40 mL    dextrose 5 % - 0.45% NaCl infusion    acetaminophen (TYLENOL) tablet 650 mg    naloxone (NARCAN) injection 0.4 mg    ondansetron (ZOFRAN) injection 4 mg    DISCONTD: HYDROmorphone (PF) (DILAUDID) injection 1 mg    diphenhydrAMINE (BENADRYL) injection 25 mg    fentaNYL (DURAGESIC) 100 mcg/hr PATCH    citalopram (CELEXA) tablet 10 mg    DISCONTD: fentaNYL (DURAGESIC) 100 mcg/hr patch 1 Patch    folic acid (FOLVITE) tablet 1 mg    ibuprofen (MOTRIN) tablet 400 mg    pantoprazole (PROTONIX) tablet 40 mg    DISCONTD: telmisartan (MICARDIS) tablet 40 mg    HYDROmorphone (PF) (DILAUDID) injection 0.5 mg    Message time and date of last Fentanyl patch application please    docusate sodium (COLACE) capsule 100 mg    polyethylene glycol (MIRALAX) packet 17 g    losartan (COZAAR) tablet 50 mg    fentaNYL (DURAGESIC) 100 mcg/hr patch 1 Patch    IP CONSULT TO HOSPITALIST    INITIAL PHYSICIAN ORDER: OBSERVATION/OUTPATIENT IN A BED OBSERVATION; Telemetry; sickle cell pain crisis         Critical Care Time:   0    Disposition:  Admit      Diagnosis     Clinical Impression:   1. Sickle cell crisis [facility])            This note will not be viewable in [address]

## 2019-07-27 NOTE — ED NOTES
Bedside and Verbal shift change report given to Chata RN (oncoming nurse) by Jesse Pagan RN (offgoing nurse). Report included the following information SBAR.

## 2019-07-27 NOTE — H&P
Hospitalist Admission Note    NAME: Elva Price   :  1960   MRN:  [id number]     Date/Time:  2019 4:32 PM    Patient PCP: Meghann Villalta MD  ______________________________________________________________________   Assessment & Plan:  Sickle cell pain crisis, POA  --IVF, IV dilaudid 0.5mg q3h prn, needs benadryl with dilaudid due to itching   --continue fentanyl patch that she added last evening (uses prn at home)  --hgb good at 9  --continue folic acid  --observation, IVF with hypotonic saline.     HTN  --continue micardis     Depression  --continue celexa     GERD  --continue PPI     Hx hep C, cured with Harvoni per patient     Body mass index is 27.84 kg/m².     Code: full  DVT prophylaxis: SCD  Surrogate decision maker:  daughter    Body mass index is 28.28 kg/m². Dr. Severa Patella to assume care in AM.          Subjective:   CHIEF COMPLAINT:  Sickle cell pain    HISTORY OF PRESENT ILLNESS:     Elva Price is a 66 y.o.  female with sickle cell disease with pain in shoulders, elbows, groins b/l and left foot MTPs started yesterday morning but went away. Pain came back in evening and she  Applied fentanyl patch (uses prn). Pain got worse at 5am, took prn hydrocodone without relief. Since being in ER, also now having some pain in sternum. Denies fever, chills, SOB, abdominal pain, n/v, dysuria. No joint swelling or rash. Under stress as daughter is expecting. We were asked to admit for work up and evaluation of the above problems.      Past Medical History:   Diagnosis Date    Anemia     SC hemoglobinopathy    Chronic pain     Depression     Hypertension     Liver disease     hepatitis C; pt reports \"cured\"    Sickle cell disease ([address]       Past Surgical History:   Procedure Laterality Date    BREAST SURGERY PROCEDURE UNLISTED      2 cysts removed from R breast    CHEST SURGERY PROCEDURE UNLISTED      status post thor for removal of a benign  HX BREAST BIOPSY Right 05/2007    negative    HX CHOLECYSTECTOMY      HX ORTHOPAEDIC      bony curettage of an ostial myelitis focus     Social History     Tobacco Use    Smoking status: Never Smoker    Smokeless tobacco: Never Used   Substance Use Topics    Alcohol use: No     Family History   Problem Relation Age of Onset    Hypertension Mother     Hypertension Father      Allergies   Allergen Reactions    Dilaudid [Hydromorphone (Bulk)] Itching     Can tolerate with benadryl and ondansetron    Percocet [Oxycodone-Acetaminophen] Itching        Prior to Admission medications    Medication Sig Start Date End Date Taking? Authorizing Provider   fentaNYL (DURAGESIC) 100 mcg/hr PATCH 1 Patch by TransDERmal route every seventy-two (72) hours. Yes Provider, Historical   HYDROcodone-acetaminophen (NORCO)  mg tablet Take 1 Tab by mouth every six (6) hours as needed for Pain for up to 30 days. Max Daily Amount: 4 Tabs. Indications: sickl;e cell crisis 7/26/19 8/25/19 Yes Kelsie Zhu MD   hydrocortisone (CORTISONE) 1 % topical cream Apply  to affected area two (2) times daily as needed for Skin Irritation. use thin layer 7/26/19  Yes Kelsie Zhu MD   telmisartan (MICARDIS) 40 mg tablet Take 1 Tab by mouth nightly. 7/1/19  Yes Kelsie Zhu MD   folic acid (FOLVITE) 1 mg tablet Take 1 mg by mouth nightly. Yes Provider, Historical   omeprazole (PRILOSEC OTC) 20 mg tablet Take 20 mg by mouth nightly. Yes Other, MD Ean   promethazine (PHENERGAN) 25 mg tablet Take 1 Tab by mouth every eight (8) hours as needed for Nausea. 5/9/19  Yes Kelsie Zhu MD   citalopram (CELEXA) 10 mg tablet TAKE 1 TABLET BY MOUTH EVERY NIGHT AT BEDTIME 5/9/19  Yes Kelsie Zhu MD   ibuprofen (MOTRIN) 200 mg tablet Take 400 mg by mouth every six (6) hours as needed for Pain.    Yes Provider, Historical   diphenhydrAMINE (BENADRYL) 25 mg tablet Take 50 mg by mouth daily as needed (for itching with hydrocodone). Yes Provider, Historical     REVIEW OF SYSTEMS:  POSITIVE= Bold. Negative = normal text  General:  fever, chills, sweats, generalized weakness, weight loss/gain, loss of appetite  Eyes:  blurred vision, eye pain, loss of vision, diplopia  Ear Nose and Throat:  rhinorrhea, pharyngitis  Respiratory:   cough, sputum production, SOB, wheezing, MARS, pleuritic pain  Cardiology:  chest pain, palpitations, orthopnea, PND, edema, syncope   Gastrointestinal:  abdominal pain, N/V, dysphagia, diarrhea, constipation, bleeding  Genitourinary:  frequency, urgency, dysuria, hematuria, incontinence  Muskuloskeletal :  arthralgia, myalgia  Hematology:  easy bruising, bleeding, lymphadenopathy  Dermatological:  rash, ulceration, pruritis  Endocrine:  hot flashes or polydipsia  Neurological:  headache, dizziness, confusion, focal weakness, paresthesia, memory loss, gait disturbance  Psychological: anxiety, depression, agitation      Objective:   VITALS:    Visit Vitals  /86   Pulse 70   Temp 98.2 °F (36.8 °C)   Resp 15   Ht 5' 10\" (1.778 m)   Wt 89.4 kg (197 lb 1.5 oz)   SpO2 98%   BMI 28.28 kg/m²     Temp (24hrs), Av.5 °F (36.9 °C), Min:98.2 °F (36.8 °C), Max:98.6 °F (37 °C)    Body mass index is 28.28 kg/m². PHYSICAL EXAM:    General:    Alert, cooperative, no distress, appears stated age. HEENT: Atraumatic, anicteric sclerae, pink conjunctivae     No oral ulcers, mucosa moist, throat clear. Hearing intact. Neck:  Supple, symmetrical,  thyroid: non tender  Lungs:   Clear to auscultation bilaterally. No Wheezing or Rhonchi. No rales. Chest wall:  No tenderness  No Accessory muscle use. Heart:   Regular  rhythm,  No  murmur   No gallop. No edema. Abdomen:   Soft, non-tender. Not distended. Bowel sounds normal. No masses  Extremities: No cyanosis. No clubbing  Skin:     Not pale Not Jaundiced  No rashes   Psych:  Good insight. Not depressed. Not anxious or agitated. Neurologic: EOMs intact. No facial asymmetry. No aphasia or slurred speech. Symmetrical strength, Alert and oriented X 3. IMAGING RESULTS:   []       I have personally reviewed the actual   []     CXR  []     CT scan  CXR:  CT :  EKG:   ________________________________________________________________________  Care Plan discussed with:    Comments   Patient y    [facility]:      ________________________________________________________________________  Prophylaxis:  GI PPI   DVT SCD   ________________________________________________________________________  Recommended Disposition:   Home with Family y   HH/PT/OT/RN    SNF/LTC    JEREMY    ________________________________________________________________________  Code Status:  Full Code y   DNR/DNI    ________________________________________________________________________  TOTAL TIME:  45 minutes      Comments    y Reviewed previous records       ______________________________________________________________________  Marky Alejandra MD      Procedures: see electronic medical records for all procedures/Xrays and details which were not copied into this note but were reviewed prior to creation of Plan. LAB DATA REVIEWED:    Recent Results (from the past 24 hour(s))   CBC WITH AUTOMATED DIFF    Collection Time: 07/27/19  9:17 AM   Result Value Ref Range    WBC 5.7 3.6 - 11.0 K/uL    RBC 2.73 (L) 3.80 - 5.20 M/uL    HGB 9.0 (L) 11.5 - 16.0 g/dL    HCT 26.0 (L) 35.0 - 47.0 %    MCV 95.2 80.0 - 99.0 FL    MCH 33.0 26.0 - 34.0 PG    MCHC 34.6 30.0 - 36.5 g/dL    RDW 14.1 11.5 - 14.5 %    PLATELET 944 141 - 456 K/uL    MPV 11.6 8.9 - 12.9 FL    NRBC 0.5 (H) 0  WBC    ABSOLUTE NRBC 0.03 (H) 0.00 - 0.01 K/uL    NEUTROPHILS 34 32 - 75 %    LYMPHOCYTES 44 12 - 49 %    MONOCYTES 15 (H) 5 - 13 %    EOSINOPHILS 6 0 - 7 %    BASOPHILS 1 0 - 1 %    IMMATURE GRANULOCYTES 0 0.0 - 0.5 %    ABS. NEUTROPHILS 2.0 1.8 - 8.0 K/UL    ABS.  LYMPHOCYTES 2.5 0.8 - 3.5 K/UL    ABS. MONOCYTES 0.9 0.0 - 1.0 K/UL    ABS. EOSINOPHILS 0.3 0.0 - 0.4 K/UL    ABS. BASOPHILS 0.0 0.0 - 0.1 K/UL    ABS. IMM. GRANS. 0.0 0.00 - 0.04 K/UL    DF AUTOMATED     METABOLIC PANEL, BASIC    Collection Time: 07/27/19  9:17 AM   Result Value Ref Range    Sodium 143 136 - 145 mmol/L    Potassium 3.5 3.5 - 5.1 mmol/L    Chloride 111 (H) 97 - 108 mmol/L    CO2 25 21 - 32 mmol/L    Anion gap 7 5 - 15 mmol/L    Glucose 92 65 - 100 mg/dL    BUN 10 6 - 20 MG/DL    Creatinine 0.77 0.55 - 1.02 MG/DL    BUN/Creatinine ratio 13 12 - 20      GFR est AA >60 >60 ml/min/1.73m2    GFR est non-AA >60 >60 ml/min/1.73m2    Calcium 8.5 8.5 - 10.1 MG/DL   RETICULOCYTE COUNT    Collection Time: 07/27/19  9:17 AM   Result Value Ref Range    Reticulocyte count 3.8 (H) 0.7 - 2.1 %    Absolute Retic Cnt. 0.1042 (H) 0.0164 - 0.0776 M/ul   EKG, 12 LEAD, INITIAL    Collection Time: 07/27/19  2:47 PM   Result Value Ref Range    Ventricular Rate 73 BPM    Atrial Rate 73 BPM    P-R Interval 120 ms    QRS Duration 80 ms    Q-T Interval 402 ms    QTC Calculation (Bezet) 442 ms    Calculated P Axis 41 degrees    Calculated R Axis 19 degrees    Calculated T Axis 14 degrees    Diagnosis       Normal sinus rhythm  Increased R/S ratio in V1, consider early transition or posterior infarct  When compared with ECG of 10-FEB-2019 09:23,  Vent.  rate has decreased BY  38 BPM

## 2019-07-27 NOTE — ROUTINE PROCESS
Bedside shift change report given to RecoVend (oncoming nurse) by Cam Wang (offgoing nurse). Report included the following information SBAR, Kardex, ED Summary, Intake/Output and MAR. Pt complained of pain once during shift; meds given appropriately.

## 2019-07-27 NOTE — ED NOTES
Pt asked about a liter of fluids due to her sickle cell condition  She says she usually gets fluids   Asked Dr. Jil Cool he was okay with this; order placed

## 2019-07-27 NOTE — ED NOTES
Assumed care from triage. Patient comes to the ED complaining of bilateral elbow pain and groin pain that started yesterday. Patient has  Sickle Cell and states that this is her flare up symptoms. Patient has a prescribed Fentanyl patch on and states that she has not been drinking water appropriately.

## 2019-07-27 NOTE — ED NOTES
TRANSFER - OUT REPORT:    Verbal report given to Sunshine Tracey RN(name) on Cinthya Flies  being transferred to Gen  Surg (unit) for routine progression of care       Report consisted of patients Situation, Background, Assessment and   Recommendations(SBAR). Information from the following report(s) SBAR was reviewed with the receiving nurse. Lines:   Peripheral IV 07/27/19 Left Antecubital (Active)   Site Assessment Clean, dry, & intact 7/27/2019  3:30 PM   Phlebitis Assessment 0 7/27/2019  3:30 PM   Infiltration Assessment 0 7/27/2019  3:30 PM   Dressing Status Clean, dry, & intact 7/27/2019  3:30 PM        Opportunity for questions and clarification was provided.       Patient transported with:   Goko

## 2019-07-27 NOTE — PROGRESS NOTES
Pharmacy Clarification of the Prior to Admission Medication Regimen Retrospective to the Admission Medication Reconciliation    The patient was interviewed regarding clarification of the prior to admission medication regimen and was questioned regarding use of any other inhalers, topical products, over the counter medications, herbal medications, vitamin products or ophthalmic/nasal/otic medication use. Information Obtained From: Patient, RX Query    Recommendations/Findings: The following amendments were made to the patient's active medication list on file at [address]:     1) Additions:   fentaNYL (DURAGESIC) 100 mcg/hr PATCH    2) Removals: NONE    3) Changes: NONE    4) Pertinent Pharmacy Findings: NONE     PTA medication list was corrected to the following:     Prior to Admission Medications   Prescriptions Last Dose Informant Patient Reported? Taking? HYDROcodone-acetaminophen (NORCO)  mg tablet 2019 at 0730 Self No Yes   Sig: Take 1 Tab by mouth every six (6) hours as needed for Pain for up to 30 days. Max Daily Amount: 4 Tabs. Indications: sickl;e cell crisis   citalopram (CELEXA) 10 mg tablet 2019 at Unknown time Self No Yes   Sig: TAKE 1 TABLET BY MOUTH EVERY NIGHT AT BEDTIME   diphenhydrAMINE (BENADRYL) 25 mg tablet 2019 at Unknown time Self Yes Yes   Sig: Take 50 mg by mouth daily as needed (for itching with hydrocodone). fentaNYL (DURAGESIC) 100 mcg/hr PATCH 2019 at Unknown time Self Yes Yes   Si Patch by TransDERmal route every seventy-two (72) hours. folic acid (FOLVITE) 1 mg tablet 2019 at Unknown time Self Yes Yes   Sig: Take 1 mg by mouth nightly. hydrocortisone (CORTISONE) 1 % topical cream 2019 at Unknown time Self No Yes   Sig: Apply  to affected area two (2) times daily as needed for Skin Irritation.  use thin layer   ibuprofen (MOTRIN) 200 mg tablet 2019 at Unknown time Self Yes Yes   Sig: Take 400 mg by mouth every six (6) hours as needed for Pain. omeprazole (PRILOSEC OTC) 20 mg tablet 7/26/2019 at Unknown time Self Yes Yes   Sig: Take 20 mg by mouth nightly. promethazine (PHENERGAN) 25 mg tablet 7/27/2019 at Unknown time Self No Yes   Sig: Take 1 Tab by mouth every eight (8) hours as needed for Nausea. telmisartan (MICARDIS) 40 mg tablet 7/26/2019 at Unknown time Self No Yes   Sig: Take 1 Tab by mouth nightly.       Facility-Administered Medications: None          Thank you,  Albino Vaughn CPhT  Medication History Pharmacy Technician

## 2019-07-28 LAB
ATRIAL RATE: 73 BPM
CALCULATED P AXIS, ECG09: 41 DEGREES
CALCULATED R AXIS, ECG10: 19 DEGREES
CALCULATED T AXIS, ECG11: 14 DEGREES
DIAGNOSIS, 93000: NORMAL
ERYTHROCYTE [DISTWIDTH] IN BLOOD BY AUTOMATED COUNT: 14 % (ref 11.5–14.5)
HCT VFR BLD AUTO: 25.8 % (ref 35–47)
HGB BLD-MCNC: 8.8 G/DL (ref 11.5–16)
MCH RBC QN AUTO: 32.4 PG (ref 26–34)
MCHC RBC AUTO-ENTMCNC: 34.1 G/DL (ref 30–36.5)
MCV RBC AUTO: 94.9 FL (ref 80–99)
NRBC # BLD: 0.03 K/UL (ref 0–0.01)
NRBC BLD-RTO: 0.3 PER 100 WBC
P-R INTERVAL, ECG05: 120 MS
PLATELET # BLD AUTO: 213 K/UL (ref 150–400)
PMV BLD AUTO: 11.3 FL (ref 8.9–12.9)
Q-T INTERVAL, ECG07: 402 MS
QRS DURATION, ECG06: 80 MS
QTC CALCULATION (BEZET), ECG08: 442 MS
RBC # BLD AUTO: 2.72 M/UL (ref 3.8–5.2)
VENTRICULAR RATE, ECG03: 73 BPM
WBC # BLD AUTO: 9.5 K/UL (ref 3.6–11)

## 2019-07-28 PROCEDURE — 94760 N-INVAS EAR/PLS OXIMETRY 1: CPT

## 2019-07-28 PROCEDURE — 74011250636 HC RX REV CODE- 250/636: Performed by: INTERNAL MEDICINE

## 2019-07-28 PROCEDURE — 85027 COMPLETE CBC AUTOMATED: CPT

## 2019-07-28 PROCEDURE — 99218 HC RM OBSERVATION: CPT

## 2019-07-28 PROCEDURE — 74011250636 HC RX REV CODE- 250/636: Performed by: HOSPITALIST

## 2019-07-28 PROCEDURE — 74011000258 HC RX REV CODE- 258: Performed by: HOSPITALIST

## 2019-07-28 PROCEDURE — 36415 COLL VENOUS BLD VENIPUNCTURE: CPT

## 2019-07-28 PROCEDURE — 74011000258 HC RX REV CODE- 258: Performed by: INTERNAL MEDICINE

## 2019-07-28 PROCEDURE — 74011250637 HC RX REV CODE- 250/637: Performed by: INTERNAL MEDICINE

## 2019-07-28 PROCEDURE — 74011250637 HC RX REV CODE- 250/637: Performed by: HOSPITALIST

## 2019-07-28 RX ORDER — FENTANYL 75 UG/H
1 PATCH TRANSDERMAL
Status: DISCONTINUED | OUTPATIENT
Start: 2019-07-28 | End: 2019-08-08 | Stop reason: HOSPADM

## 2019-07-28 RX ADMIN — DEXTROSE MONOHYDRATE AND SODIUM CHLORIDE 125 ML/HR: 5; .45 INJECTION, SOLUTION INTRAVENOUS at 00:06

## 2019-07-28 RX ADMIN — DEXTROSE MONOHYDRATE AND SODIUM CHLORIDE 125 ML/HR: 5; .45 INJECTION, SOLUTION INTRAVENOUS at 09:02

## 2019-07-28 RX ADMIN — HYDROMORPHONE HYDROCHLORIDE 0.5 MG: 1 INJECTION, SOLUTION INTRAMUSCULAR; INTRAVENOUS; SUBCUTANEOUS at 05:48

## 2019-07-28 RX ADMIN — CITALOPRAM HYDROBROMIDE 10 MG: 20 TABLET ORAL at 21:03

## 2019-07-28 RX ADMIN — Medication 10 ML: at 21:05

## 2019-07-28 RX ADMIN — POTASSIUM CHLORIDE: 2 INJECTION, SOLUTION, CONCENTRATE INTRAVENOUS at 11:56

## 2019-07-28 RX ADMIN — PANTOPRAZOLE SODIUM 40 MG: 40 TABLET, DELAYED RELEASE ORAL at 06:50

## 2019-07-28 RX ADMIN — HYDROMORPHONE HYDROCHLORIDE 0.5 MG: 1 INJECTION, SOLUTION INTRAMUSCULAR; INTRAVENOUS; SUBCUTANEOUS at 08:59

## 2019-07-28 RX ADMIN — HYDROMORPHONE HYDROCHLORIDE 0.5 MG: 1 INJECTION, SOLUTION INTRAMUSCULAR; INTRAVENOUS; SUBCUTANEOUS at 21:03

## 2019-07-28 RX ADMIN — LOSARTAN POTASSIUM 50 MG: 50 TABLET ORAL at 21:02

## 2019-07-28 RX ADMIN — HYDROMORPHONE HYDROCHLORIDE 0.5 MG: 1 INJECTION, SOLUTION INTRAMUSCULAR; INTRAVENOUS; SUBCUTANEOUS at 18:00

## 2019-07-28 RX ADMIN — ONDANSETRON 4 MG: 2 INJECTION INTRAMUSCULAR; INTRAVENOUS at 05:47

## 2019-07-28 RX ADMIN — POTASSIUM CHLORIDE: 2 INJECTION, SOLUTION, CONCENTRATE INTRAVENOUS at 21:45

## 2019-07-28 RX ADMIN — Medication 10 ML: at 17:59

## 2019-07-28 RX ADMIN — DOCUSATE SODIUM 100 MG: 100 CAPSULE, LIQUID FILLED ORAL at 18:00

## 2019-07-28 RX ADMIN — FOLIC ACID 1 MG: 1 TABLET ORAL at 21:02

## 2019-07-28 RX ADMIN — DIPHENHYDRAMINE HYDROCHLORIDE 25 MG: 50 INJECTION, SOLUTION INTRAMUSCULAR; INTRAVENOUS at 18:00

## 2019-07-28 RX ADMIN — HYDROMORPHONE HYDROCHLORIDE 0.5 MG: 1 INJECTION, SOLUTION INTRAMUSCULAR; INTRAVENOUS; SUBCUTANEOUS at 02:44

## 2019-07-28 RX ADMIN — HYDROMORPHONE HYDROCHLORIDE 0.5 MG: 1 INJECTION, SOLUTION INTRAMUSCULAR; INTRAVENOUS; SUBCUTANEOUS at 14:54

## 2019-07-28 RX ADMIN — ONDANSETRON 4 MG: 2 INJECTION INTRAMUSCULAR; INTRAVENOUS at 18:00

## 2019-07-28 RX ADMIN — HYDROMORPHONE HYDROCHLORIDE 0.5 MG: 1 INJECTION, SOLUTION INTRAMUSCULAR; INTRAVENOUS; SUBCUTANEOUS at 11:41

## 2019-07-28 RX ADMIN — DIPHENHYDRAMINE HYDROCHLORIDE 25 MG: 50 INJECTION, SOLUTION INTRAMUSCULAR; INTRAVENOUS at 05:48

## 2019-07-28 RX ADMIN — DOCUSATE SODIUM 100 MG: 100 CAPSULE, LIQUID FILLED ORAL at 08:59

## 2019-07-28 RX ADMIN — DIPHENHYDRAMINE HYDROCHLORIDE 25 MG: 50 INJECTION, SOLUTION INTRAMUSCULAR; INTRAVENOUS at 11:41

## 2019-07-28 RX ADMIN — ONDANSETRON 4 MG: 2 INJECTION INTRAMUSCULAR; INTRAVENOUS at 11:41

## 2019-07-28 NOTE — ROUTINE PROCESS
Bedside shift change report given to Sally Escobar (oncoming nurse) by Ashley Saleh (offgoing nurse). Report included the following information SBAR, Kardex, ED Summary, Intake/Output and MAR. Pt complained of pain several times during shift which was managed with PRN meds as ordered. Tolerated all meals with no complaints of nausea or vomiting. Up adlib to the bathroom. Family/friends came to visit.

## 2019-07-28 NOTE — PROGRESS NOTES
General Surgery End of Shift Nursing Note    Bedside shift change report given to Estefana Primrose, RN (oncoming nurse) by Marlene Walter RN (offgoing nurse). Report included the following information SBAR and Recent Results. Shift worked:   [id number]   Summary of shift:    No significant events overnight. Medicated for pain with prn 0.5mg IV dilaudid q3 hrs. VSS. Slept in between care.     Issues for physician to address:   None       Bayron Coronado RN

## 2019-07-28 NOTE — PROGRESS NOTES
General Daily Progress Note    Admit Date: 7/27/2019    Subjective:     Patient complains of pain in back, arms and legs. Current Facility-Administered Medications   Medication Dose Route Frequency    fentaNYL (DURAGESIC) 75 mcg/hr patch 1 Patch  1 Patch TransDERmal Q72H    dextrose 5 % - 0.45% NaCl 1,000 mL with potassium chloride 20 mEq infusion   IntraVENous CONTINUOUS    sodium chloride (NS) flush 5-40 mL  5-40 mL IntraVENous Q8H    sodium chloride (NS) flush 5-40 mL  5-40 mL IntraVENous PRN    acetaminophen (TYLENOL) tablet 650 mg  650 mg Oral Q6H PRN    naloxone (NARCAN) injection 0.4 mg  0.4 mg IntraVENous PRN    ondansetron (ZOFRAN) injection 4 mg  4 mg IntraVENous Q6H PRN    diphenhydrAMINE (BENADRYL) injection 25 mg  25 mg IntraVENous Q6H PRN    citalopram (CELEXA) tablet 10 mg  10 mg Oral QHS    folic acid (FOLVITE) tablet 1 mg  1 mg Oral QHS    ibuprofen (MOTRIN) tablet 400 mg  400 mg Oral Q6H PRN    pantoprazole (PROTONIX) tablet 40 mg  40 mg Oral ACB    HYDROmorphone (PF) (DILAUDID) injection 0.5 mg  0.5 mg IntraVENous Q3H PRN    docusate sodium (COLACE) capsule 100 mg  100 mg Oral BID    polyethylene glycol (MIRALAX) packet 17 g  17 g Oral DAILY PRN    losartan (COZAAR) tablet 50 mg  50 mg Oral QHS        Review of Systems  A comprehensive review of systems was negative.     Objective:     Patient Vitals for the past 24 hrs:   BP Temp Pulse Resp SpO2   07/28/19 0723 140/84 99.3 °F (37.4 °C) 75 16 99 %   07/28/19 0255 111/52 98.1 °F (36.7 °C) 70 14 97 %   07/27/19 2341 125/71 98.7 °F (37.1 °C) 76 16 99 %   07/27/19 1848 138/76 98.4 °F (36.9 °C) 79 16 99 %   07/27/19 1652 152/76 98.5 °F (36.9 °C) 72 16 100 %   07/27/19 1610 142/86 98.2 °F (36.8 °C) 70 15 98 %   07/27/19 1530 (!) 146/100    98 %   07/27/19 1500 145/86    97 %   07/27/19 1441 158/89 98.6 °F (37 °C) 78 18    07/27/19 1400 (!) 140/99    99 %   07/27/19 1330 132/70    97 %   07/27/19 1300 130/77    97 % 07/27/19 1230 148/77  75 18 96 %   07/27/19 1200 149/86  75  97 %   07/27/19 1030 (!) 156/93  72  98 %     07/28 0701 - 07/28 1900  In: 520 [P.O.:520]  Out: -   07/26 1901 - 07/28 0700  In: 1978.3 [P.O.:420; I.V.:1558.3]  Out: -     Physical Exam:   Visit Vitals  /84 (BP 1 Location: Right arm, BP Patient Position: Sitting)   Pulse 75   Temp 99.3 °F (37.4 °C)   Resp 16   Ht 5' 10\" (1.778 m)   Wt 197 lb 1.5 oz (89.4 kg)   SpO2 99%   BMI 28.28 kg/m²     General appearance: alert, cooperative, no distress, appears stated age  Neck: supple, symmetrical, trachea midline, no adenopathy, thyroid: not enlarged, symmetric, no tenderness/mass/nodules, no carotid bruit and no JVD  Lungs: clear to auscultation bilaterally  Heart: regular rate and rhythm, S1, S2 normal, no murmur, click, rub or gallop  Abdomen: soft, non-tender. Bowel sounds normal. No masses,  no organomegaly  Extremities: extremities normal, atraumatic, no cyanosis or edema        Data Review   Recent Results (from the past 24 hour(s))   EKG, 12 LEAD, INITIAL    Collection Time: 07/27/19  2:47 PM   Result Value Ref Range    Ventricular Rate 73 BPM    Atrial Rate 73 BPM    P-R Interval 120 ms    QRS Duration 80 ms    Q-T Interval 402 ms    QTC Calculation (Bezet) 442 ms    Calculated P Axis 41 degrees    Calculated R Axis 19 degrees    Calculated T Axis 14 degrees    Diagnosis       Normal sinus rhythm  Increased R/S ratio in V1, consider early transition or posterior infarct  When compared with ECG of 10-FEB-2019 09:23,  Vent.  rate has decreased BY  38 BPM  Confirmed by Vijay Martel ([id number]) on 7/28/2019 9:29:02 AM     CBC W/O DIFF    Collection Time: 07/28/19  2:47 AM   Result Value Ref Range    WBC 9.5 3.6 - 11.0 K/uL    RBC 2.72 (L) 3.80 - 5.20 M/uL    HGB 8.8 (L) 11.5 - 16.0 g/dL    HCT 25.8 (L) 35.0 - 47.0 %    MCV 94.9 80.0 - 99.0 FL    MCH 32.4 26.0 - 34.0 PG    MCHC 34.1 30.0 - 36.5 g/dL    RDW 14.0 11.5 - 14.5 %    PLATELET 535 724 - 400 K/uL    MPV 11.3 8.9 - 12.9 FL    NRBC 0.3 (H) 0  WBC    ABSOLUTE NRBC 0.03 (H) 0.00 - 0.01 K/uL           Assessment:     Active Problems:    Sickle cell pain crisis ([facility] [address] (9/8/2018)        Plan:     1. Events surrounding admission noted. Comes in with a full-fledged painful crises. Continue pain control and hydration.

## 2019-07-29 LAB
APPEARANCE UR: CLEAR
BACTERIA URNS QL MICRO: NEGATIVE /HPF
BASOPHILS # BLD: 0.1 K/UL (ref 0–0.1)
BASOPHILS NFR BLD: 1 % (ref 0–1)
BILIRUB UR QL: NEGATIVE
BLASTS NFR BLD MANUAL: 0 %
COLOR UR: ABNORMAL
DIFFERENTIAL METHOD BLD: ABNORMAL
EOSINOPHIL # BLD: 0.5 K/UL (ref 0–0.4)
EOSINOPHIL NFR BLD: 6 % (ref 0–7)
EPITH CASTS URNS QL MICRO: ABNORMAL /LPF
ERYTHROCYTE [DISTWIDTH] IN BLOOD BY AUTOMATED COUNT: 14.2 % (ref 11.5–14.5)
GLUCOSE UR STRIP.AUTO-MCNC: NEGATIVE MG/DL
HCT VFR BLD AUTO: 24.3 % (ref 35–47)
HGB BLD-MCNC: 8.4 G/DL (ref 11.5–16)
HGB UR QL STRIP: NEGATIVE
HYALINE CASTS URNS QL MICRO: ABNORMAL /LPF (ref 0–5)
IMM GRANULOCYTES # BLD AUTO: 0 K/UL
IMM GRANULOCYTES NFR BLD AUTO: 0 %
KETONES UR QL STRIP.AUTO: NEGATIVE MG/DL
LEUKOCYTE ESTERASE UR QL STRIP.AUTO: NEGATIVE
LYMPHOCYTES # BLD: 5.2 K/UL (ref 0.8–3.5)
LYMPHOCYTES NFR BLD: 58 % (ref 12–49)
MCH RBC QN AUTO: 33.3 PG (ref 26–34)
MCHC RBC AUTO-ENTMCNC: 34.6 G/DL (ref 30–36.5)
MCV RBC AUTO: 96.4 FL (ref 80–99)
METAMYELOCYTES NFR BLD MANUAL: 0 %
MONOCYTES # BLD: 0.8 K/UL (ref 0–1)
MONOCYTES NFR BLD: 9 % (ref 5–13)
MYELOCYTES NFR BLD MANUAL: 1 %
NEUTS BAND NFR BLD MANUAL: 0 % (ref 0–6)
NEUTS SEG # BLD: 2.2 K/UL (ref 1.8–8)
NEUTS SEG NFR BLD: 25 % (ref 32–75)
NITRITE UR QL STRIP.AUTO: NEGATIVE
NRBC # BLD: 0.07 K/UL (ref 0–0.01)
NRBC BLD-RTO: 0.8 PER 100 WBC
OTHER CELLS NFR BLD MANUAL: 0 %
PH UR STRIP: 6.5 [PH] (ref 5–8)
PLATELET # BLD AUTO: 197 K/UL (ref 150–400)
PMV BLD AUTO: 11.4 FL (ref 8.9–12.9)
PROMYELOCYTES NFR BLD MANUAL: 0 %
PROT UR STRIP-MCNC: NEGATIVE MG/DL
RBC # BLD AUTO: 2.52 M/UL (ref 3.8–5.2)
RBC #/AREA URNS HPF: ABNORMAL /HPF (ref 0–5)
RBC MORPH BLD: ABNORMAL
SP GR UR REFRACTOMETRY: 1.01 (ref 1–1.03)
UROBILINOGEN UR QL STRIP.AUTO: 4 EU/DL (ref 0.2–1)
WBC # BLD AUTO: 8.9 K/UL (ref 3.6–11)
WBC URNS QL MICRO: ABNORMAL /HPF (ref 0–4)

## 2019-07-29 PROCEDURE — 94760 N-INVAS EAR/PLS OXIMETRY 1: CPT

## 2019-07-29 PROCEDURE — 99218 HC RM OBSERVATION: CPT

## 2019-07-29 PROCEDURE — 36415 COLL VENOUS BLD VENIPUNCTURE: CPT

## 2019-07-29 PROCEDURE — 65660000000 HC RM CCU STEPDOWN

## 2019-07-29 PROCEDURE — 74011250636 HC RX REV CODE- 250/636: Performed by: INTERNAL MEDICINE

## 2019-07-29 PROCEDURE — 74011250636 HC RX REV CODE- 250/636: Performed by: HOSPITALIST

## 2019-07-29 PROCEDURE — 81001 URINALYSIS AUTO W/SCOPE: CPT

## 2019-07-29 PROCEDURE — 85027 COMPLETE CBC AUTOMATED: CPT

## 2019-07-29 PROCEDURE — 74011250637 HC RX REV CODE- 250/637: Performed by: HOSPITALIST

## 2019-07-29 RX ORDER — DEXTROSE, SODIUM CHLORIDE, AND POTASSIUM CHLORIDE 5; .45; .15 G/100ML; G/100ML; G/100ML
50 INJECTION INTRAVENOUS CONTINUOUS
Status: DISCONTINUED | OUTPATIENT
Start: 2019-07-29 | End: 2019-08-06

## 2019-07-29 RX ADMIN — DIPHENHYDRAMINE HYDROCHLORIDE 25 MG: 50 INJECTION, SOLUTION INTRAMUSCULAR; INTRAVENOUS at 19:03

## 2019-07-29 RX ADMIN — DIPHENHYDRAMINE HYDROCHLORIDE 25 MG: 50 INJECTION, SOLUTION INTRAMUSCULAR; INTRAVENOUS at 00:03

## 2019-07-29 RX ADMIN — ONDANSETRON 4 MG: 2 INJECTION INTRAMUSCULAR; INTRAVENOUS at 00:03

## 2019-07-29 RX ADMIN — ONDANSETRON 4 MG: 2 INJECTION INTRAMUSCULAR; INTRAVENOUS at 12:56

## 2019-07-29 RX ADMIN — HYDROMORPHONE HYDROCHLORIDE 0.5 MG: 1 INJECTION, SOLUTION INTRAMUSCULAR; INTRAVENOUS; SUBCUTANEOUS at 00:03

## 2019-07-29 RX ADMIN — LOSARTAN POTASSIUM 50 MG: 50 TABLET ORAL at 21:27

## 2019-07-29 RX ADMIN — DOCUSATE SODIUM 100 MG: 100 CAPSULE, LIQUID FILLED ORAL at 08:42

## 2019-07-29 RX ADMIN — Medication 10 ML: at 21:28

## 2019-07-29 RX ADMIN — HYDROMORPHONE HYDROCHLORIDE 0.5 MG: 1 INJECTION, SOLUTION INTRAMUSCULAR; INTRAVENOUS; SUBCUTANEOUS at 19:02

## 2019-07-29 RX ADMIN — ONDANSETRON 4 MG: 2 INJECTION INTRAMUSCULAR; INTRAVENOUS at 19:03

## 2019-07-29 RX ADMIN — HYDROMORPHONE HYDROCHLORIDE 0.5 MG: 1 INJECTION, SOLUTION INTRAMUSCULAR; INTRAVENOUS; SUBCUTANEOUS at 12:57

## 2019-07-29 RX ADMIN — DEXTROSE MONOHYDRATE, SODIUM CHLORIDE, AND POTASSIUM CHLORIDE 100 ML/HR: 50; 4.5; 1.49 INJECTION, SOLUTION INTRAVENOUS at 08:41

## 2019-07-29 RX ADMIN — PANTOPRAZOLE SODIUM 40 MG: 40 TABLET, DELAYED RELEASE ORAL at 08:12

## 2019-07-29 RX ADMIN — Medication 10 ML: at 05:09

## 2019-07-29 RX ADMIN — DEXTROSE MONOHYDRATE, SODIUM CHLORIDE, AND POTASSIUM CHLORIDE 100 ML/HR: 50; 4.5; 1.49 INJECTION, SOLUTION INTRAVENOUS at 19:03

## 2019-07-29 RX ADMIN — CITALOPRAM HYDROBROMIDE 10 MG: 20 TABLET ORAL at 21:27

## 2019-07-29 RX ADMIN — HYDROMORPHONE HYDROCHLORIDE 0.5 MG: 1 INJECTION, SOLUTION INTRAMUSCULAR; INTRAVENOUS; SUBCUTANEOUS at 03:03

## 2019-07-29 RX ADMIN — Medication 10 ML: at 16:02

## 2019-07-29 RX ADMIN — DIPHENHYDRAMINE HYDROCHLORIDE 25 MG: 50 INJECTION, SOLUTION INTRAMUSCULAR; INTRAVENOUS at 06:41

## 2019-07-29 RX ADMIN — ONDANSETRON 4 MG: 2 INJECTION INTRAMUSCULAR; INTRAVENOUS at 06:38

## 2019-07-29 RX ADMIN — FOLIC ACID 1 MG: 1 TABLET ORAL at 21:27

## 2019-07-29 RX ADMIN — HYDROMORPHONE HYDROCHLORIDE 0.5 MG: 1 INJECTION, SOLUTION INTRAMUSCULAR; INTRAVENOUS; SUBCUTANEOUS at 16:02

## 2019-07-29 RX ADMIN — HYDROMORPHONE HYDROCHLORIDE 0.5 MG: 1 INJECTION, SOLUTION INTRAMUSCULAR; INTRAVENOUS; SUBCUTANEOUS at 06:39

## 2019-07-29 RX ADMIN — HYDROMORPHONE HYDROCHLORIDE 0.5 MG: 1 INJECTION, SOLUTION INTRAMUSCULAR; INTRAVENOUS; SUBCUTANEOUS at 09:43

## 2019-07-29 RX ADMIN — DIPHENHYDRAMINE HYDROCHLORIDE 25 MG: 50 INJECTION, SOLUTION INTRAMUSCULAR; INTRAVENOUS at 12:56

## 2019-07-29 RX ADMIN — DOCUSATE SODIUM 100 MG: 100 CAPSULE, LIQUID FILLED ORAL at 18:35

## 2019-07-29 RX ADMIN — HYDROMORPHONE HYDROCHLORIDE 0.5 MG: 1 INJECTION, SOLUTION INTRAMUSCULAR; INTRAVENOUS; SUBCUTANEOUS at 22:03

## 2019-07-29 NOTE — PROGRESS NOTES
Plan:  -Home  -Family to transport      Reason for Admission:   Sickle cell crisis                   RRAT Score:  19                Do you (patient/family) have any concerns for transition/discharge? No concerns at this time               Plan for utilizing home health:  Likely none     Current Advanced Directive/Advance Care Plan:  Not on file/Full code             Transition of Care Plan:      Home       10:45AM  CM in to speak with pt to complete assessment and discuss d/c planning. Pt is a 67 yo female admitted for sickle cell crisis. Pt lives alone. She is independent at baseline, including driving. Pt names her dtr as a support. Pt has had many hospitalizations this year for the same thing. A management plan has been put in place. CM discussed pt's flare and pt verbalized awareness of her disease and discussed ways she tries to take care of herself. Pt states that things are going very well at home when she is feeling well. She has no questions or concerns at this time. Pt's dtr likely to transport her home. CM will continue to follow and assist with d/c planning. Care Management Interventions  PCP Verified by CM: Yes(Dr. Mehnaz Araujo )  Mode of Transport at Discharge:  Other (see comment)(Family )  Transition of Care Consult (CM Consult): Discharge Planning  Discharge Durable Medical Equipment: No  Physical Therapy Consult: No  Occupational Therapy Consult: No  Speech Therapy Consult: No  Current Support Network: Lives Alone, Own Home  Confirm Follow Up Transport: Self  Plan discussed with Pt/Family/Caregiver: Yes  Discharge Location  Discharge Placement: Home      TEMITOPE Parr  Care Manager

## 2019-07-29 NOTE — PHYSICIAN ADVISORY
Letter of Status Determination:   Recommend hospitalization status upgraded from   OBSERVATION  to INPATIENT  Status     Pt Name:  Rika Howell   MR#   [facility] # [id number] /  [id number]  Payor: Lalo Wood / Plan: Shoshana Maker / Product Type: Medicare /    AMALIA#  [id number]   Room and Hospital  2178/01  @ [facility]   Hospitalization date  7/27/2019  8:42 AM   Current Attending Physician  Umang Covington MD   Principal diagnosis  Sickle cell Crisis   Clinicals  Rika Howell is a 66 y.o.  female with sickle cell disease with pain in shoulders, elbows, groins b/l and left foot MTPs started yesterday morning but went away. Pain came back in evening and she  Applied fentanyl patch (uses prn). Pain got worse at 5am, took prn hydrocodone without relief. Since being in ER, also now having some pain in sternum. Denies fever, chills, SOB, abdominal pain, n/v, dysuria. No joint swelling or rash. Under stress as daughter is expecting.   Pt continues to be in severe pain, Hb trending down, need IV opoids for pain control, on aggressive IV hydration      Milliman (MCG) criteria       STATUS DETERMINATION  Inpatient    The final decision of the patient's hospitalization status depends on the attending physician's judgment    Additional comments     Payor: Lalo Wood / Plan: [place] MEDICARE PART A & B / Product Type: Medicare /         Wilmar Delvalle MD  Cell: [phone]  Physician Advisor

## 2019-07-29 NOTE — PROGRESS NOTES
Bedside and Verbal shift change report given to David Ramirez RN (oncoming nurse) by Kellee Cast RN (offgoing nurse).  Report included the following information SBAR, Kardex, Intake/Output, MAR and Recent Results.

## 2019-07-29 NOTE — PROGRESS NOTES
General Daily Progress Note    Admit Date: 7/27/2019    Subjective:     Patient continues to complain of back, neck, and joint pain. Additionally she complains of pain in her suprapubic region without urinary symptoms. Current Facility-Administered Medications   Medication Dose Route Frequency    fentaNYL (DURAGESIC) 75 mcg/hr patch 1 Patch  1 Patch TransDERmal Q72H    sodium chloride (NS) flush 5-40 mL  5-40 mL IntraVENous Q8H    sodium chloride (NS) flush 5-40 mL  5-40 mL IntraVENous PRN    acetaminophen (TYLENOL) tablet 650 mg  650 mg Oral Q6H PRN    naloxone (NARCAN) injection 0.4 mg  0.4 mg IntraVENous PRN    ondansetron (ZOFRAN) injection 4 mg  4 mg IntraVENous Q6H PRN    diphenhydrAMINE (BENADRYL) injection 25 mg  25 mg IntraVENous Q6H PRN    citalopram (CELEXA) tablet 10 mg  10 mg Oral QHS    folic acid (FOLVITE) tablet 1 mg  1 mg Oral QHS    ibuprofen (MOTRIN) tablet 400 mg  400 mg Oral Q6H PRN    pantoprazole (PROTONIX) tablet 40 mg  40 mg Oral ACB    HYDROmorphone (PF) (DILAUDID) injection 0.5 mg  0.5 mg IntraVENous Q3H PRN    docusate sodium (COLACE) capsule 100 mg  100 mg Oral BID    polyethylene glycol (MIRALAX) packet 17 g  17 g Oral DAILY PRN    losartan (COZAAR) tablet 50 mg  50 mg Oral QHS        Review of Systems  A comprehensive review of systems was negative. Objective:     Patient Vitals for the past 24 hrs:   BP Temp Pulse Resp SpO2   07/29/19 0726 124/69 99.2 °F (37.3 °C) 73 16 99 %   07/29/19 0232 115/58 98.9 °F (37.2 °C) 79 16 100 %   07/28/19 2345 135/72 99.4 °F (37.4 °C) 83 16 96 %   07/28/19 2021 122/71 99.6 °F (37.6 °C) 88 16 97 %   07/28/19 1136 134/77 99.6 °F (37.6 °C) 83 16 100 %     07/29 0701 - 07/29 1900  In: 480 [P.O.:480]  Out: -   07/27 1901 - 07/29 0700  In: 4639.2 [P.O.:1760;  I.V.:2879.2]  Out: 850 [Urine:850]    Physical Exam:   Visit Vitals  /69   Pulse 73   Temp 99.2 °F (37.3 °C)   Resp 16   Ht 5' 10\" (1.778 m)   Wt 197 lb 1.5 oz (89.4 kg) SpO2 99%   BMI 28.28 kg/m²     General appearance: alert, cooperative, no distress, appears stated age  Neck: supple, symmetrical, trachea midline, no adenopathy, thyroid: not enlarged, symmetric, no tenderness/mass/nodules, no carotid bruit and no JVD  Lungs: clear to auscultation bilaterally  Heart: regular rate and rhythm, S1, S2 normal, no murmur, click, rub or gallop  Abdomen: soft, non-tender. Bowel sounds normal. No masses,  no organomegaly  Extremities: extremities normal, atraumatic, no cyanosis or edema        Data Review   Recent Results (from the past 24 hour(s))   CBC WITH MANUAL DIFF    Collection Time: 07/29/19  3:35 AM   Result Value Ref Range    WBC 8.9 3.6 - 11.0 K/uL    RBC 2.52 (L) 3.80 - 5.20 M/uL    HGB 8.4 (L) 11.5 - 16.0 g/dL    HCT 24.3 (L) 35.0 - 47.0 %    MCV 96.4 80.0 - 99.0 FL    MCH 33.3 26.0 - 34.0 PG    MCHC 34.6 30.0 - 36.5 g/dL    RDW 14.2 11.5 - 14.5 %    PLATELET 499 310 - 224 K/uL    MPV 11.4 8.9 - 12.9 FL    NRBC 0.8 (H) 0  WBC    ABSOLUTE NRBC 0.07 (H) 0.00 - 0.01 K/uL    NEUTROPHILS 25 (L) 32 - 75 %    BAND NEUTROPHILS 0 0 - 6 %    LYMPHOCYTES 58 (H) 12 - 49 %    MONOCYTES 9 5 - 13 %    EOSINOPHILS 6 0 - 7 %    BASOPHILS 1 0 - 1 %    METAMYELOCYTES 0 0 %    MYELOCYTES 1 (H) 0 %    PROMYELOCYTES 0 0 %    BLASTS 0 0 %    OTHER CELL 0 0      IMMATURE GRANULOCYTES 0 %    ABS. NEUTROPHILS 2.2 1.8 - 8.0 K/UL    ABS. LYMPHOCYTES 5.2 (H) 0.8 - 3.5 K/UL    ABS. MONOCYTES 0.8 0.0 - 1.0 K/UL    ABS. EOSINOPHILS 0.5 (H) 0.0 - 0.4 K/UL    ABS. BASOPHILS 0.1 0.0 - 0.1 K/UL    ABS. IMM. GRANS. 0.0 K/UL    DF MANUAL      RBC COMMENTS ANISOCYTOSIS  1+        RBC COMMENTS SICKLE CELLS  PRESENT        RBC COMMENTS TARGET CELLS  1+               Assessment:     Active Problems:    Sickle cell pain crisis ([facility] (9/8/2018)        Plan:     1. Continue treatment for painful sickle cell crisis. 2.  Not entirely sure of the origin of the suprapubic pain.   Urinalysis will be checked but I have a low index of suspicion that urinary problem. 3.  Continue hydration.

## 2019-07-29 NOTE — PROGRESS NOTES
General Surgery End of Shift Nursing Note    Bedside shift change report given to Sarah Wagner RN (oncoming nurse) . Report included the following information SBAR, Kardex, ED Summary, Intake/Output, MAR and Recent Results. Shift worked:   7875-8017   Summary of shift:  Still with intermittent pain. Denies SOB   Issues for physician to address:        Number times ambulated in hallway past shift:     Number of times OOB to chair past shift: up ad erum    Pain Management:  Current medication: Dilaudid  Patient states pain is manageable on current pain medication: YES    GI:    Current diet:  DIET REGULAR    Tolerating current diet: YES  Passing flatus: YES  Last Bowel Movement:    Appearance:     Respiratory:    Incentive Spirometer at bedside: YES  Patient instructed on use: YES    Patient Safety:    Falls Score: 1  Bed Alarm On? No  Sitter?  No    Adis Slater RN

## 2019-07-30 PROCEDURE — 65660000000 HC RM CCU STEPDOWN

## 2019-07-30 PROCEDURE — 94760 N-INVAS EAR/PLS OXIMETRY 1: CPT

## 2019-07-30 PROCEDURE — 74011250636 HC RX REV CODE- 250/636: Performed by: INTERNAL MEDICINE

## 2019-07-30 PROCEDURE — 74011250637 HC RX REV CODE- 250/637: Performed by: HOSPITALIST

## 2019-07-30 PROCEDURE — 74011250636 HC RX REV CODE- 250/636: Performed by: HOSPITALIST

## 2019-07-30 RX ADMIN — HYDROMORPHONE HYDROCHLORIDE 0.5 MG: 1 INJECTION, SOLUTION INTRAMUSCULAR; INTRAVENOUS; SUBCUTANEOUS at 06:46

## 2019-07-30 RX ADMIN — Medication 10 ML: at 21:45

## 2019-07-30 RX ADMIN — DOCUSATE SODIUM 100 MG: 100 CAPSULE, LIQUID FILLED ORAL at 18:39

## 2019-07-30 RX ADMIN — FOLIC ACID 1 MG: 1 TABLET ORAL at 21:45

## 2019-07-30 RX ADMIN — PANTOPRAZOLE SODIUM 40 MG: 40 TABLET, DELAYED RELEASE ORAL at 07:54

## 2019-07-30 RX ADMIN — ONDANSETRON 4 MG: 2 INJECTION INTRAMUSCULAR; INTRAVENOUS at 00:54

## 2019-07-30 RX ADMIN — HYDROMORPHONE HYDROCHLORIDE 0.5 MG: 1 INJECTION, SOLUTION INTRAMUSCULAR; INTRAVENOUS; SUBCUTANEOUS at 21:44

## 2019-07-30 RX ADMIN — DEXTROSE MONOHYDRATE, SODIUM CHLORIDE, AND POTASSIUM CHLORIDE 100 ML/HR: 50; 4.5; 1.49 INJECTION, SOLUTION INTRAVENOUS at 14:50

## 2019-07-30 RX ADMIN — ONDANSETRON 4 MG: 2 INJECTION INTRAMUSCULAR; INTRAVENOUS at 18:40

## 2019-07-30 RX ADMIN — DIPHENHYDRAMINE HYDROCHLORIDE 25 MG: 50 INJECTION, SOLUTION INTRAMUSCULAR; INTRAVENOUS at 18:40

## 2019-07-30 RX ADMIN — Medication 10 ML: at 05:15

## 2019-07-30 RX ADMIN — ONDANSETRON 4 MG: 2 INJECTION INTRAMUSCULAR; INTRAVENOUS at 06:41

## 2019-07-30 RX ADMIN — HYDROMORPHONE HYDROCHLORIDE 0.5 MG: 1 INJECTION, SOLUTION INTRAMUSCULAR; INTRAVENOUS; SUBCUTANEOUS at 00:58

## 2019-07-30 RX ADMIN — DIPHENHYDRAMINE HYDROCHLORIDE 25 MG: 50 INJECTION, SOLUTION INTRAMUSCULAR; INTRAVENOUS at 12:40

## 2019-07-30 RX ADMIN — HYDROMORPHONE HYDROCHLORIDE 0.5 MG: 1 INJECTION, SOLUTION INTRAMUSCULAR; INTRAVENOUS; SUBCUTANEOUS at 03:37

## 2019-07-30 RX ADMIN — HYDROMORPHONE HYDROCHLORIDE 0.5 MG: 1 INJECTION, SOLUTION INTRAMUSCULAR; INTRAVENOUS; SUBCUTANEOUS at 09:43

## 2019-07-30 RX ADMIN — HYDROMORPHONE HYDROCHLORIDE 0.5 MG: 1 INJECTION, SOLUTION INTRAMUSCULAR; INTRAVENOUS; SUBCUTANEOUS at 12:40

## 2019-07-30 RX ADMIN — HYDROMORPHONE HYDROCHLORIDE 0.5 MG: 1 INJECTION, SOLUTION INTRAMUSCULAR; INTRAVENOUS; SUBCUTANEOUS at 18:40

## 2019-07-30 RX ADMIN — DOCUSATE SODIUM 100 MG: 100 CAPSULE, LIQUID FILLED ORAL at 09:43

## 2019-07-30 RX ADMIN — HYDROMORPHONE HYDROCHLORIDE 0.5 MG: 1 INJECTION, SOLUTION INTRAMUSCULAR; INTRAVENOUS; SUBCUTANEOUS at 15:44

## 2019-07-30 RX ADMIN — DIPHENHYDRAMINE HYDROCHLORIDE 25 MG: 50 INJECTION, SOLUTION INTRAMUSCULAR; INTRAVENOUS at 00:58

## 2019-07-30 RX ADMIN — DIPHENHYDRAMINE HYDROCHLORIDE 25 MG: 50 INJECTION, SOLUTION INTRAMUSCULAR; INTRAVENOUS at 06:46

## 2019-07-30 RX ADMIN — DEXTROSE MONOHYDRATE, SODIUM CHLORIDE, AND POTASSIUM CHLORIDE 100 ML/HR: 50; 4.5; 1.49 INJECTION, SOLUTION INTRAVENOUS at 05:18

## 2019-07-30 RX ADMIN — LOSARTAN POTASSIUM 50 MG: 50 TABLET ORAL at 21:45

## 2019-07-30 RX ADMIN — ONDANSETRON 4 MG: 2 INJECTION INTRAMUSCULAR; INTRAVENOUS at 12:40

## 2019-07-30 RX ADMIN — CITALOPRAM HYDROBROMIDE 10 MG: 20 TABLET ORAL at 21:45

## 2019-07-30 NOTE — PROGRESS NOTES
General Surgery End of Shift Nursing Note    Bedside shift change report given to Sunita (oncoming nurse) by Enoc Stevenson (offgoing nurse). Report included the following information SBAR. Shift worked:   7a-7p   Summary of shift:    Uneventful shift. Pain well controlled with prescribed medications. Issues for physician to address:   none     Number times ambulated in hallway past shift: 0    Number of times OOB to chair past shift: 2    Pain Management:  Current medication: Dilaudid q3h  Patient states pain is manageable on current pain medication: YES    GI:    Current diet:  DIET REGULAR    Tolerating current diet: YES  Passing flatus: YES  Last Bowel Movement: several days ago       Respiratory:    Incentive Spirometer at bedside: NO  Patient instructed on use: NO    Patient Safety:    Falls Score: 1  Bed Alarm On? No  Sitter?  No    Tata Chew

## 2019-07-30 NOTE — PROGRESS NOTES
Spiritual Care Partner Volunteer visited patient in Gen Surg on July 30, 2019.   Documented by:  ROGE Longoria, [facility], roz [facility] Paging Service  [phone]

## 2019-07-30 NOTE — PROGRESS NOTES
General Daily Progress Note    Admit Date: 7/27/2019    Subjective:     Patient continues to complain of pain in her neck back and arms. Current Facility-Administered Medications   Medication Dose Route Frequency    dextrose 5% - 0.45% NaCl with KCl 20 mEq/L infusion  100 mL/hr IntraVENous CONTINUOUS    fentaNYL (DURAGESIC) 75 mcg/hr patch 1 Patch  1 Patch TransDERmal Q72H    sodium chloride (NS) flush 5-40 mL  5-40 mL IntraVENous Q8H    sodium chloride (NS) flush 5-40 mL  5-40 mL IntraVENous PRN    acetaminophen (TYLENOL) tablet 650 mg  650 mg Oral Q6H PRN    naloxone (NARCAN) injection 0.4 mg  0.4 mg IntraVENous PRN    ondansetron (ZOFRAN) injection 4 mg  4 mg IntraVENous Q6H PRN    diphenhydrAMINE (BENADRYL) injection 25 mg  25 mg IntraVENous Q6H PRN    citalopram (CELEXA) tablet 10 mg  10 mg Oral QHS    folic acid (FOLVITE) tablet 1 mg  1 mg Oral QHS    ibuprofen (MOTRIN) tablet 400 mg  400 mg Oral Q6H PRN    pantoprazole (PROTONIX) tablet 40 mg  40 mg Oral ACB    HYDROmorphone (PF) (DILAUDID) injection 0.5 mg  0.5 mg IntraVENous Q3H PRN    docusate sodium (COLACE) capsule 100 mg  100 mg Oral BID    polyethylene glycol (MIRALAX) packet 17 g  17 g Oral DAILY PRN    losartan (COZAAR) tablet 50 mg  50 mg Oral QHS        Review of Systems  A comprehensive review of systems was negative. Objective:     Patient Vitals for the past 24 hrs:   BP Temp Pulse Resp SpO2   07/30/19 0648 134/69 98.2 °F (36.8 °C) 78 18 97 %   07/30/19 0345 155/86 98.7 °F (37.1 °C) 84 18 97 %   07/29/19 2249 112/71 99 °F (37.2 °C) 74 18 96 %   07/29/19 1930 137/75 99.8 °F (37.7 °C) 82 18 94 %   07/29/19 1502 142/81 97.6 °F (36.4 °C) 76 18 100 %   07/29/19 1108 136/83 99.6 °F (37.6 °C) 82 18 97 %     No intake/output data recorded.   07/28 1901 - 07/30 0700  In: 4561.7 [P.O.:1380; I.V.:3181.7]  Out: -     Physical Exam:   Visit Vitals  /69   Pulse 78   Temp 98.2 °F (36.8 °C)   Resp 18   Ht 5' 10\" (1.778 m)   Wt 197 lb 1.5 oz (89.4 kg)   SpO2 97%   BMI 28.28 kg/m²     General appearance: alert, cooperative, no distress, appears stated age  Neck: supple, symmetrical, trachea midline, no adenopathy, thyroid: not enlarged, symmetric, no tenderness/mass/nodules, no carotid bruit and no JVD  Lungs: clear to auscultation bilaterally  Heart: regular rate and rhythm, S1, S2 normal, no murmur, click, rub or gallop  Abdomen: soft, non-tender. Bowel sounds normal. No masses,  no organomegaly  Extremities: extremities normal, atraumatic, no cyanosis or edema        Data Review   Recent Results (from the past 24 hour(s))   URINALYSIS W/MICROSCOPIC    Collection Time: 07/29/19  1:04 PM   Result Value Ref Range    Color YELLOW/STRAW      Appearance CLEAR CLEAR      Specific gravity 1.009 1.003 - 1.030      pH (UA) 6.5 5.0 - 8.0      Protein NEGATIVE  NEG mg/dL    Glucose NEGATIVE  NEG mg/dL    Ketone NEGATIVE  NEG mg/dL    Bilirubin NEGATIVE  NEG      Blood NEGATIVE  NEG      Urobilinogen 4.0 (H) 0.2 - 1.0 EU/dL    Nitrites NEGATIVE  NEG      Leukocyte Esterase NEGATIVE  NEG      WBC 0-4 0 - 4 /hpf    RBC 0-5 0 - 5 /hpf    Epithelial cells FEW FEW /lpf    Bacteria NEGATIVE  NEG /hpf    Hyaline cast 0-2 0 - 5 /lpf           Assessment:     Active Problems:    Sickle cell crisis ([facility] (5/22/2017)      Sickle cell pain crisis ([facility] (9/8/2018)        Plan:     1. Continue treatment of painful crises. Minimal improvement. 2.  Will mobilize.

## 2019-07-30 NOTE — PROGRESS NOTES
General Surgery End of Shift Nursing Note    Bedside shift change report given to Wyatt Beltrán (oncoming nurse)  Report included the following information SBAR, Kardex, Intake/Output, MAR and Recent Results. Shift worked:   1547-6814   Summary of shift:    Pain much better as per patient. No SOB noted   Issues for physician to address:        Number times ambulated in hallway past shift:     Number of times OOB to chair past shift: x1    Pain Management:  Current medication: Dilaudid  Patient states pain is manageable on current pain medication: YES    GI:    Current diet:  DIET REGULAR    Tolerating current diet: YES  Passing flatus: YES  Last Bowel Movement:    Appearance:     Respiratory:    Incentive Spirometer at bedside: YES  Patient instructed on use: YES    Patient Safety:    Falls Score: 1  Bed Alarm On? No  Sitter?  No    Robert Carroll RN

## 2019-07-31 LAB
ALBUMIN SERPL-MCNC: 3.5 G/DL (ref 3.5–5)
ALBUMIN/GLOB SERPL: 0.8 {RATIO} (ref 1.1–2.2)
ALP SERPL-CCNC: 117 U/L (ref 45–117)
ALT SERPL-CCNC: 17 U/L (ref 12–78)
ANION GAP SERPL CALC-SCNC: 3 MMOL/L (ref 5–15)
AST SERPL-CCNC: 32 U/L (ref 15–37)
BASOPHILS # BLD: 0 K/UL (ref 0–0.1)
BASOPHILS NFR BLD: 0 % (ref 0–1)
BILIRUB SERPL-MCNC: 1.5 MG/DL (ref 0.2–1)
BLASTS NFR BLD MANUAL: 0 %
BUN SERPL-MCNC: 7 MG/DL (ref 6–20)
BUN/CREAT SERPL: 9 (ref 12–20)
CALCIUM SERPL-MCNC: 8.4 MG/DL (ref 8.5–10.1)
CHLORIDE SERPL-SCNC: 105 MMOL/L (ref 97–108)
CO2 SERPL-SCNC: 29 MMOL/L (ref 21–32)
CREAT SERPL-MCNC: 0.74 MG/DL (ref 0.55–1.02)
DIFFERENTIAL METHOD BLD: ABNORMAL
EOSINOPHIL # BLD: 0.3 K/UL (ref 0–0.4)
EOSINOPHIL NFR BLD: 3 % (ref 0–7)
ERYTHROCYTE [DISTWIDTH] IN BLOOD BY AUTOMATED COUNT: 14.3 % (ref 11.5–14.5)
GLOBULIN SER CALC-MCNC: 4.4 G/DL (ref 2–4)
GLUCOSE SERPL-MCNC: 87 MG/DL (ref 65–100)
HCT VFR BLD AUTO: 23.6 % (ref 35–47)
HGB BLD-MCNC: 8.2 G/DL (ref 11.5–16)
IMM GRANULOCYTES # BLD AUTO: 0 K/UL
IMM GRANULOCYTES NFR BLD AUTO: 0 %
LYMPHOCYTES # BLD: 4.9 K/UL (ref 0.8–3.5)
LYMPHOCYTES NFR BLD: 53 % (ref 12–49)
MCH RBC QN AUTO: 32.7 PG (ref 26–34)
MCHC RBC AUTO-ENTMCNC: 34.7 G/DL (ref 30–36.5)
MCV RBC AUTO: 94 FL (ref 80–99)
METAMYELOCYTES NFR BLD MANUAL: 0 %
MONOCYTES # BLD: 0.8 K/UL (ref 0–1)
MONOCYTES NFR BLD: 8 % (ref 5–13)
MYELOCYTES NFR BLD MANUAL: 0 %
NEUTS BAND NFR BLD MANUAL: 0 % (ref 0–6)
NEUTS SEG # BLD: 3.4 K/UL (ref 1.8–8)
NEUTS SEG NFR BLD: 36 % (ref 32–75)
NRBC # BLD: 0.05 K/UL (ref 0–0.01)
NRBC BLD-RTO: 0.5 PER 100 WBC
OTHER CELLS NFR BLD MANUAL: 0 %
PLATELET # BLD AUTO: 178 K/UL (ref 150–400)
PMV BLD AUTO: 11.9 FL (ref 8.9–12.9)
POTASSIUM SERPL-SCNC: 4.2 MMOL/L (ref 3.5–5.1)
PROMYELOCYTES NFR BLD MANUAL: 0 %
PROT SERPL-MCNC: 7.9 G/DL (ref 6.4–8.2)
RBC # BLD AUTO: 2.51 M/UL (ref 3.8–5.2)
RBC MORPH BLD: ABNORMAL
SODIUM SERPL-SCNC: 137 MMOL/L (ref 136–145)
WBC # BLD AUTO: 9.4 K/UL (ref 3.6–11)

## 2019-07-31 PROCEDURE — 74011250636 HC RX REV CODE- 250/636: Performed by: HOSPITALIST

## 2019-07-31 PROCEDURE — 36415 COLL VENOUS BLD VENIPUNCTURE: CPT

## 2019-07-31 PROCEDURE — 65660000000 HC RM CCU STEPDOWN

## 2019-07-31 PROCEDURE — 85027 COMPLETE CBC AUTOMATED: CPT

## 2019-07-31 PROCEDURE — 74011250637 HC RX REV CODE- 250/637: Performed by: HOSPITALIST

## 2019-07-31 PROCEDURE — 74011250637 HC RX REV CODE- 250/637: Performed by: INTERNAL MEDICINE

## 2019-07-31 PROCEDURE — 74011250636 HC RX REV CODE- 250/636: Performed by: INTERNAL MEDICINE

## 2019-07-31 PROCEDURE — 94760 N-INVAS EAR/PLS OXIMETRY 1: CPT

## 2019-07-31 PROCEDURE — 80053 COMPREHEN METABOLIC PANEL: CPT

## 2019-07-31 RX ADMIN — FOLIC ACID 1 MG: 1 TABLET ORAL at 21:24

## 2019-07-31 RX ADMIN — DIPHENHYDRAMINE HYDROCHLORIDE 25 MG: 50 INJECTION, SOLUTION INTRAMUSCULAR; INTRAVENOUS at 20:41

## 2019-07-31 RX ADMIN — HYDROMORPHONE HYDROCHLORIDE 0.5 MG: 1 INJECTION, SOLUTION INTRAMUSCULAR; INTRAVENOUS; SUBCUTANEOUS at 20:41

## 2019-07-31 RX ADMIN — ONDANSETRON 4 MG: 2 INJECTION INTRAMUSCULAR; INTRAVENOUS at 14:28

## 2019-07-31 RX ADMIN — Medication 10 ML: at 15:32

## 2019-07-31 RX ADMIN — HYDROMORPHONE HYDROCHLORIDE 0.5 MG: 1 INJECTION, SOLUTION INTRAMUSCULAR; INTRAVENOUS; SUBCUTANEOUS at 00:46

## 2019-07-31 RX ADMIN — PANTOPRAZOLE SODIUM 40 MG: 40 TABLET, DELAYED RELEASE ORAL at 07:07

## 2019-07-31 RX ADMIN — DIPHENHYDRAMINE HYDROCHLORIDE 25 MG: 50 INJECTION, SOLUTION INTRAMUSCULAR; INTRAVENOUS at 07:49

## 2019-07-31 RX ADMIN — HYDROMORPHONE HYDROCHLORIDE 0.5 MG: 1 INJECTION, SOLUTION INTRAMUSCULAR; INTRAVENOUS; SUBCUTANEOUS at 14:27

## 2019-07-31 RX ADMIN — Medication 10 ML: at 00:47

## 2019-07-31 RX ADMIN — DEXTROSE MONOHYDRATE, SODIUM CHLORIDE, AND POTASSIUM CHLORIDE 100 ML/HR: 50; 4.5; 1.49 INJECTION, SOLUTION INTRAVENOUS at 00:51

## 2019-07-31 RX ADMIN — ONDANSETRON 4 MG: 2 INJECTION INTRAMUSCULAR; INTRAVENOUS at 20:41

## 2019-07-31 RX ADMIN — Medication 10 ML: at 04:10

## 2019-07-31 RX ADMIN — CITALOPRAM HYDROBROMIDE 10 MG: 20 TABLET ORAL at 21:20

## 2019-07-31 RX ADMIN — DIPHENHYDRAMINE HYDROCHLORIDE 25 MG: 50 INJECTION, SOLUTION INTRAMUSCULAR; INTRAVENOUS at 14:28

## 2019-07-31 RX ADMIN — Medication 10 ML: at 21:25

## 2019-07-31 RX ADMIN — HYDROMORPHONE HYDROCHLORIDE 0.5 MG: 1 INJECTION, SOLUTION INTRAMUSCULAR; INTRAVENOUS; SUBCUTANEOUS at 11:20

## 2019-07-31 RX ADMIN — Medication 10 ML: at 06:51

## 2019-07-31 RX ADMIN — ONDANSETRON 4 MG: 2 INJECTION INTRAMUSCULAR; INTRAVENOUS at 00:46

## 2019-07-31 RX ADMIN — HYDROMORPHONE HYDROCHLORIDE 0.5 MG: 1 INJECTION, SOLUTION INTRAMUSCULAR; INTRAVENOUS; SUBCUTANEOUS at 07:49

## 2019-07-31 RX ADMIN — HYDROMORPHONE HYDROCHLORIDE 0.5 MG: 1 INJECTION, SOLUTION INTRAMUSCULAR; INTRAVENOUS; SUBCUTANEOUS at 23:47

## 2019-07-31 RX ADMIN — ONDANSETRON 4 MG: 2 INJECTION INTRAMUSCULAR; INTRAVENOUS at 07:49

## 2019-07-31 RX ADMIN — DOCUSATE SODIUM 100 MG: 100 CAPSULE, LIQUID FILLED ORAL at 10:25

## 2019-07-31 RX ADMIN — DOCUSATE SODIUM 100 MG: 100 CAPSULE, LIQUID FILLED ORAL at 17:37

## 2019-07-31 RX ADMIN — DIPHENHYDRAMINE HYDROCHLORIDE 25 MG: 50 INJECTION, SOLUTION INTRAMUSCULAR; INTRAVENOUS at 00:46

## 2019-07-31 RX ADMIN — DEXTROSE MONOHYDRATE, SODIUM CHLORIDE, AND POTASSIUM CHLORIDE 100 ML/HR: 50; 4.5; 1.49 INJECTION, SOLUTION INTRAVENOUS at 21:20

## 2019-07-31 RX ADMIN — LOSARTAN POTASSIUM 50 MG: 50 TABLET ORAL at 21:20

## 2019-07-31 RX ADMIN — HYDROMORPHONE HYDROCHLORIDE 0.5 MG: 1 INJECTION, SOLUTION INTRAMUSCULAR; INTRAVENOUS; SUBCUTANEOUS at 04:09

## 2019-07-31 RX ADMIN — DEXTROSE MONOHYDRATE, SODIUM CHLORIDE, AND POTASSIUM CHLORIDE 100 ML/HR: 50; 4.5; 1.49 INJECTION, SOLUTION INTRAVENOUS at 11:20

## 2019-07-31 RX ADMIN — HYDROMORPHONE HYDROCHLORIDE 0.5 MG: 1 INJECTION, SOLUTION INTRAMUSCULAR; INTRAVENOUS; SUBCUTANEOUS at 17:37

## 2019-07-31 NOTE — PROGRESS NOTES
General Surgery End of Shift Nursing Note    Bedside shift change report given to Sally Escobar  (oncoming nurse) by Fern Argueta (offgoing nurse). Report included the following information SBAR, Kardex and MAR. Shift worked:   7a-7p   Summary of shift:    Controlled pain, itching and nausea with meds. Pt ate 100% meals. Issues for physician to address:        Number times ambulated in hallway past shift: 0    Number of times OOB to chair past shift: 0    Pain Management:  Current medication: see mar  Patient states pain is manageable on current pain medication: YES    GI:    Current diet:  DIET REGULAR    Tolerating current diet: YES  Passing flatus: YES  Last Bowel Movement: yesterday   Appearance: brown    Respiratory:    Incentive Spirometer at bedside: NO  Patient instructed on use: NO    Patient Safety:    Falls Score: 1  Bed Alarm On? No  Sitter?  No    Elizabeth Florence

## 2019-07-31 NOTE — PROGRESS NOTES
General Daily Progress Note    Admit Date: 7/27/2019    Subjective:     Patient has no complaint. Current Facility-Administered Medications   Medication Dose Route Frequency    dextrose 5% - 0.45% NaCl with KCl 20 mEq/L infusion  100 mL/hr IntraVENous CONTINUOUS    fentaNYL (DURAGESIC) 75 mcg/hr patch 1 Patch  1 Patch TransDERmal Q72H    sodium chloride (NS) flush 5-40 mL  5-40 mL IntraVENous Q8H    sodium chloride (NS) flush 5-40 mL  5-40 mL IntraVENous PRN    acetaminophen (TYLENOL) tablet 650 mg  650 mg Oral Q6H PRN    naloxone (NARCAN) injection 0.4 mg  0.4 mg IntraVENous PRN    ondansetron (ZOFRAN) injection 4 mg  4 mg IntraVENous Q6H PRN    diphenhydrAMINE (BENADRYL) injection 25 mg  25 mg IntraVENous Q6H PRN    citalopram (CELEXA) tablet 10 mg  10 mg Oral QHS    folic acid (FOLVITE) tablet 1 mg  1 mg Oral QHS    ibuprofen (MOTRIN) tablet 400 mg  400 mg Oral Q6H PRN    pantoprazole (PROTONIX) tablet 40 mg  40 mg Oral ACB    HYDROmorphone (PF) (DILAUDID) injection 0.5 mg  0.5 mg IntraVENous Q3H PRN    docusate sodium (COLACE) capsule 100 mg  100 mg Oral BID    polyethylene glycol (MIRALAX) packet 17 g  17 g Oral DAILY PRN    losartan (COZAAR) tablet 50 mg  50 mg Oral QHS        Review of Systems  A comprehensive review of systems was negative. Objective:     Patient Vitals for the past 24 hrs:   BP Temp Pulse Resp SpO2   07/31/19 0655 95/70 98.4 °F (36.9 °C) 74 16 94 %   07/31/19 0410 134/68 98.4 °F (36.9 °C) 81 18 98 %   07/30/19 1858 121/76 98.6 °F (37 °C) 91 18 99 %   07/30/19 1537 130/71 98.6 °F (37 °C) 82 18 95 %   07/30/19 1128 104/48 98.2 °F (36.8 °C) 75 16 99 %     No intake/output data recorded. 07/29 1901 - 07/31 0700  In: 3398.3 [P.O.:500;  I.V.:2898.3]  Out: -     Physical Exam:   Visit Vitals  BP 95/70   Pulse 74   Temp 98.4 °F (36.9 °C)   Resp 16   Ht 5' 10\" (1.778 m)   Wt 197 lb 1.5 oz (89.4 kg)   SpO2 94%   BMI 28.28 kg/m²     General appearance: alert, cooperative, no distress, appears stated age  Neck: supple, symmetrical, trachea midline, no adenopathy, thyroid: not enlarged, symmetric, no tenderness/mass/nodules, no carotid bruit and no JVD  Lungs: clear to auscultation bilaterally  Heart: regular rate and rhythm, S1, S2 normal, no murmur, click, rub or gallop  Abdomen: soft, non-tender. Bowel sounds normal. No masses,  no organomegaly  Extremities: extremities normal, atraumatic, no cyanosis or edema        Data Review   Recent Results (from the past 24 hour(s))   METABOLIC PANEL, COMPREHENSIVE    Collection Time: 07/31/19  4:15 AM   Result Value Ref Range    Sodium 137 136 - 145 mmol/L    Potassium 4.2 3.5 - 5.1 mmol/L    Chloride 105 97 - 108 mmol/L    CO2 29 21 - 32 mmol/L    Anion gap 3 (L) 5 - 15 mmol/L    Glucose 87 65 - 100 mg/dL    BUN 7 6 - 20 MG/DL    Creatinine 0.74 0.55 - 1.02 MG/DL    BUN/Creatinine ratio 9 (L) 12 - 20      GFR est AA >60 >60 ml/min/1.73m2    GFR est non-AA >60 >60 ml/min/1.73m2    Calcium 8.4 (L) 8.5 - 10.1 MG/DL    Bilirubin, total 1.5 (H) 0.2 - 1.0 MG/DL    ALT (SGPT) 17 12 - 78 U/L    AST (SGOT) 32 15 - 37 U/L    Alk.  phosphatase 117 45 - 117 U/L    Protein, total 7.9 6.4 - 8.2 g/dL    Albumin 3.5 3.5 - 5.0 g/dL    Globulin 4.4 (H) 2.0 - 4.0 g/dL    A-G Ratio 0.8 (L) 1.1 - 2.2     CBC WITH MANUAL DIFF    Collection Time: 07/31/19  4:15 AM   Result Value Ref Range    WBC 9.4 3.6 - 11.0 K/uL    RBC 2.51 (L) 3.80 - 5.20 M/uL    HGB 8.2 (L) 11.5 - 16.0 g/dL    HCT 23.6 (L) 35.0 - 47.0 %    MCV 94.0 80.0 - 99.0 FL    MCH 32.7 26.0 - 34.0 PG    MCHC 34.7 30.0 - 36.5 g/dL    RDW 14.3 11.5 - 14.5 %    PLATELET 333 532 - 039 K/uL    MPV 11.9 8.9 - 12.9 FL    NRBC 0.5 (H) 0  WBC    ABSOLUTE NRBC 0.05 (H) 0.00 - 0.01 K/uL    NEUTROPHILS 36 32 - 75 %    BAND NEUTROPHILS 0 0 - 6 %    LYMPHOCYTES 53 (H) 12 - 49 %    MONOCYTES 8 5 - 13 %    EOSINOPHILS 3 0 - 7 %    BASOPHILS 0 0 - 1 %    METAMYELOCYTES 0 0 %    MYELOCYTES 0 0 %    PROMYELOCYTES 0 0 % BLASTS 0 0 %    OTHER CELL 0 0      IMMATURE GRANULOCYTES 0 %    ABS. NEUTROPHILS 3.4 1.8 - 8.0 K/UL    ABS. LYMPHOCYTES 4.9 (H) 0.8 - 3.5 K/UL    ABS. MONOCYTES 0.8 0.0 - 1.0 K/UL    ABS. EOSINOPHILS 0.3 0.0 - 0.4 K/UL    ABS. BASOPHILS 0.0 0.0 - 0.1 K/UL    ABS. IMM. GRANS. 0.0 K/UL    DF MANUAL      RBC COMMENTS ANISOCYTOSIS  1+        RBC COMMENTS MACROCYTOSIS  1+        RBC COMMENTS TARGET CELLS  PRESENT               Assessment:     Active Problems:    Sickle cell crisis ([facility]) (5/22/2017)      [facility] (9/8/2018)        Plan:     1. Continue treatment of painful crises. 2.  Will mobilize.

## 2019-07-31 NOTE — PROGRESS NOTES
Problem: Falls - Risk of  Goal: *Absence of Falls  Description  Document Andrea Fells Fall Risk and appropriate interventions in the flowsheet.   7/31/2019 1620 by Genet Hugo  Outcome: Progressing Towards Goal  Note:   Fall Risk Interventions:            Medication Interventions: Patient to call before getting OOB                7/31/2019 1321 by Genet Hugo  Note:   Fall Risk Interventions:            Medication Interventions: Patient to call before getting OOB                   Problem: Patient Education: Go to Patient Education Activity  Goal: Patient/Family Education  7/31/2019 1620 by Genet Hugo  Outcome: Progressing Towards Goal  7/31/2019 1321 by Genet Hugo  Outcome: Progressing Towards Goal

## 2019-07-31 NOTE — PROGRESS NOTES
Bedside and Verbal shift change report given to Samantha Fleming RN (oncoming nurse) by Dwight Díaz RN (offgoing nurse). Report included the following information SBAR, Kardex, Intake/Output, MAR and Cardiac Rhythm NSR.

## 2019-07-31 NOTE — PROGRESS NOTES
General Daily Progress Note    Admit Date: 7/27/2019    Subjective:     Patient complains of generalized pain especially worse in pelvic region. Current Facility-Administered Medications   Medication Dose Route Frequency    dextrose 5% - 0.45% NaCl with KCl 20 mEq/L infusion  100 mL/hr IntraVENous CONTINUOUS    fentaNYL (DURAGESIC) 75 mcg/hr patch 1 Patch  1 Patch TransDERmal Q72H    sodium chloride (NS) flush 5-40 mL  5-40 mL IntraVENous Q8H    sodium chloride (NS) flush 5-40 mL  5-40 mL IntraVENous PRN    acetaminophen (TYLENOL) tablet 650 mg  650 mg Oral Q6H PRN    naloxone (NARCAN) injection 0.4 mg  0.4 mg IntraVENous PRN    ondansetron (ZOFRAN) injection 4 mg  4 mg IntraVENous Q6H PRN    diphenhydrAMINE (BENADRYL) injection 25 mg  25 mg IntraVENous Q6H PRN    citalopram (CELEXA) tablet 10 mg  10 mg Oral QHS    folic acid (FOLVITE) tablet 1 mg  1 mg Oral QHS    ibuprofen (MOTRIN) tablet 400 mg  400 mg Oral Q6H PRN    pantoprazole (PROTONIX) tablet 40 mg  40 mg Oral ACB    HYDROmorphone (PF) (DILAUDID) injection 0.5 mg  0.5 mg IntraVENous Q3H PRN    docusate sodium (COLACE) capsule 100 mg  100 mg Oral BID    polyethylene glycol (MIRALAX) packet 17 g  17 g Oral DAILY PRN    losartan (COZAAR) tablet 50 mg  50 mg Oral QHS        Review of Systems  A comprehensive review of systems was negative. Objective:     Patient Vitals for the past 24 hrs:   BP Temp Pulse Resp SpO2   07/31/19 0655 95/70 98.4 °F (36.9 °C) 74 16 94 %   07/31/19 0410 134/68 98.4 °F (36.9 °C) 81 18 98 %   07/30/19 1858 121/76 98.6 °F (37 °C) 91 18 99 %   07/30/19 1537 130/71 98.6 °F (37 °C) 82 18 95 %   07/30/19 1128 104/48 98.2 °F (36.8 °C) 75 16 99 %     No intake/output data recorded. 07/29 1901 - 07/31 0700  In: 3398.3 [P.O.:500;  I.V.:2898.3]  Out: -     Physical Exam:   Visit Vitals  BP 95/70   Pulse 74   Temp 98.4 °F (36.9 °C)   Resp 16   Ht 5' 10\" (1.778 m)   Wt 197 lb 1.5 oz (89.4 kg)   SpO2 94%   BMI 28.28 kg/m² General appearance: alert, cooperative, no distress, appears stated age  Neck: supple, symmetrical, trachea midline, no adenopathy, thyroid: not enlarged, symmetric, no tenderness/mass/nodules, no carotid bruit and no JVD  Lungs: clear to auscultation bilaterally  Heart: regular rate and rhythm, S1, S2 normal, no murmur, click, rub or gallop  Abdomen: soft, non-tender. Bowel sounds normal. No masses,  no organomegaly  Extremities: extremities normal, atraumatic, no cyanosis or edema        Data Review   Recent Results (from the past 24 hour(s))   METABOLIC PANEL, COMPREHENSIVE    Collection Time: 07/31/19  4:15 AM   Result Value Ref Range    Sodium 137 136 - 145 mmol/L    Potassium 4.2 3.5 - 5.1 mmol/L    Chloride 105 97 - 108 mmol/L    CO2 29 21 - 32 mmol/L    Anion gap 3 (L) 5 - 15 mmol/L    Glucose 87 65 - 100 mg/dL    BUN 7 6 - 20 MG/DL    Creatinine 0.74 0.55 - 1.02 MG/DL    BUN/Creatinine ratio 9 (L) 12 - 20      GFR est AA >60 >60 ml/min/1.73m2    GFR est non-AA >60 >60 ml/min/1.73m2    Calcium 8.4 (L) 8.5 - 10.1 MG/DL    Bilirubin, total 1.5 (H) 0.2 - 1.0 MG/DL    ALT (SGPT) 17 12 - 78 U/L    AST (SGOT) 32 15 - 37 U/L    Alk.  phosphatase 117 45 - 117 U/L    Protein, total 7.9 6.4 - 8.2 g/dL    Albumin 3.5 3.5 - 5.0 g/dL    Globulin 4.4 (H) 2.0 - 4.0 g/dL    A-G Ratio 0.8 (L) 1.1 - 2.2     CBC WITH MANUAL DIFF    Collection Time: 07/31/19  4:15 AM   Result Value Ref Range    WBC 9.4 3.6 - 11.0 K/uL    RBC 2.51 (L) 3.80 - 5.20 M/uL    HGB 8.2 (L) 11.5 - 16.0 g/dL    HCT 23.6 (L) 35.0 - 47.0 %    MCV 94.0 80.0 - 99.0 FL    MCH 32.7 26.0 - 34.0 PG    MCHC 34.7 30.0 - 36.5 g/dL    RDW 14.3 11.5 - 14.5 %    PLATELET 236 970 - 557 K/uL    MPV 11.9 8.9 - 12.9 FL    NRBC 0.5 (H) 0  WBC    ABSOLUTE NRBC 0.05 (H) 0.00 - 0.01 K/uL    NEUTROPHILS 36 32 - 75 %    BAND NEUTROPHILS 0 0 - 6 %    LYMPHOCYTES 53 (H) 12 - 49 %    MONOCYTES 8 5 - 13 %    EOSINOPHILS 3 0 - 7 %    BASOPHILS 0 0 - 1 %    METAMYELOCYTES 0 0 %    MYELOCYTES 0 0 %    PROMYELOCYTES 0 0 %    BLASTS 0 0 %    OTHER CELL 0 0      IMMATURE GRANULOCYTES 0 %    ABS. NEUTROPHILS 3.4 1.8 - 8.0 K/UL    ABS. LYMPHOCYTES 4.9 (H) 0.8 - 3.5 K/UL    ABS. MONOCYTES 0.8 0.0 - 1.0 K/UL    ABS. EOSINOPHILS 0.3 0.0 - 0.4 K/UL    ABS. BASOPHILS 0.0 0.0 - 0.1 K/UL    ABS. IMM. GRANS. 0.0 K/UL    DF MANUAL      RBC COMMENTS ANISOCYTOSIS  1+        RBC COMMENTS MACROCYTOSIS  1+        RBC COMMENTS TARGET CELLS  PRESENT               Assessment:     Active Problems:    Sickle cell crisis ([facility]) (5/22/2017)      [facility] (9/8/2018)        Plan:     1. Continue treatment of painful crises. Improvement is slow but progressive. 2.  Will mobilize.

## 2019-08-01 PROCEDURE — 65660000000 HC RM CCU STEPDOWN

## 2019-08-01 PROCEDURE — 74011250636 HC RX REV CODE- 250/636: Performed by: INTERNAL MEDICINE

## 2019-08-01 PROCEDURE — 74011250637 HC RX REV CODE- 250/637: Performed by: HOSPITALIST

## 2019-08-01 PROCEDURE — 94760 N-INVAS EAR/PLS OXIMETRY 1: CPT

## 2019-08-01 PROCEDURE — 74011250636 HC RX REV CODE- 250/636: Performed by: HOSPITALIST

## 2019-08-01 RX ADMIN — Medication 10 ML: at 05:14

## 2019-08-01 RX ADMIN — HYDROMORPHONE HYDROCHLORIDE 0.5 MG: 1 INJECTION, SOLUTION INTRAMUSCULAR; INTRAVENOUS; SUBCUTANEOUS at 12:26

## 2019-08-01 RX ADMIN — CITALOPRAM HYDROBROMIDE 10 MG: 20 TABLET ORAL at 21:22

## 2019-08-01 RX ADMIN — ONDANSETRON 4 MG: 2 INJECTION INTRAMUSCULAR; INTRAVENOUS at 09:18

## 2019-08-01 RX ADMIN — LOSARTAN POTASSIUM 50 MG: 50 TABLET ORAL at 21:22

## 2019-08-01 RX ADMIN — HYDROMORPHONE HYDROCHLORIDE 0.5 MG: 1 INJECTION, SOLUTION INTRAMUSCULAR; INTRAVENOUS; SUBCUTANEOUS at 09:18

## 2019-08-01 RX ADMIN — HYDROMORPHONE HYDROCHLORIDE 0.5 MG: 1 INJECTION, SOLUTION INTRAMUSCULAR; INTRAVENOUS; SUBCUTANEOUS at 06:04

## 2019-08-01 RX ADMIN — FOLIC ACID 1 MG: 1 TABLET ORAL at 21:22

## 2019-08-01 RX ADMIN — ONDANSETRON 4 MG: 2 INJECTION INTRAMUSCULAR; INTRAVENOUS at 15:28

## 2019-08-01 RX ADMIN — HYDROMORPHONE HYDROCHLORIDE 0.5 MG: 1 INJECTION, SOLUTION INTRAMUSCULAR; INTRAVENOUS; SUBCUTANEOUS at 18:26

## 2019-08-01 RX ADMIN — HYDROMORPHONE HYDROCHLORIDE 0.5 MG: 1 INJECTION, SOLUTION INTRAMUSCULAR; INTRAVENOUS; SUBCUTANEOUS at 15:28

## 2019-08-01 RX ADMIN — DOCUSATE SODIUM 100 MG: 100 CAPSULE, LIQUID FILLED ORAL at 18:26

## 2019-08-01 RX ADMIN — DIPHENHYDRAMINE HYDROCHLORIDE 25 MG: 50 INJECTION, SOLUTION INTRAMUSCULAR; INTRAVENOUS at 21:23

## 2019-08-01 RX ADMIN — HYDROMORPHONE HYDROCHLORIDE 0.5 MG: 1 INJECTION, SOLUTION INTRAMUSCULAR; INTRAVENOUS; SUBCUTANEOUS at 21:23

## 2019-08-01 RX ADMIN — ONDANSETRON 4 MG: 2 INJECTION INTRAMUSCULAR; INTRAVENOUS at 02:56

## 2019-08-01 RX ADMIN — DIPHENHYDRAMINE HYDROCHLORIDE 25 MG: 50 INJECTION, SOLUTION INTRAMUSCULAR; INTRAVENOUS at 09:18

## 2019-08-01 RX ADMIN — ONDANSETRON 4 MG: 2 INJECTION INTRAMUSCULAR; INTRAVENOUS at 21:22

## 2019-08-01 RX ADMIN — Medication 10 ML: at 21:28

## 2019-08-01 RX ADMIN — Medication 10 ML: at 13:34

## 2019-08-01 RX ADMIN — HYDROMORPHONE HYDROCHLORIDE 0.5 MG: 1 INJECTION, SOLUTION INTRAMUSCULAR; INTRAVENOUS; SUBCUTANEOUS at 02:57

## 2019-08-01 RX ADMIN — DIPHENHYDRAMINE HYDROCHLORIDE 25 MG: 50 INJECTION, SOLUTION INTRAMUSCULAR; INTRAVENOUS at 02:59

## 2019-08-01 RX ADMIN — DIPHENHYDRAMINE HYDROCHLORIDE 25 MG: 50 INJECTION, SOLUTION INTRAMUSCULAR; INTRAVENOUS at 15:28

## 2019-08-01 RX ADMIN — DOCUSATE SODIUM 100 MG: 100 CAPSULE, LIQUID FILLED ORAL at 09:19

## 2019-08-01 RX ADMIN — DEXTROSE MONOHYDRATE, SODIUM CHLORIDE, AND POTASSIUM CHLORIDE 100 ML/HR: 50; 4.5; 1.49 INJECTION, SOLUTION INTRAVENOUS at 06:33

## 2019-08-01 RX ADMIN — DEXTROSE MONOHYDRATE, SODIUM CHLORIDE, AND POTASSIUM CHLORIDE 100 ML/HR: 50; 4.5; 1.49 INJECTION, SOLUTION INTRAVENOUS at 15:40

## 2019-08-01 RX ADMIN — PANTOPRAZOLE SODIUM 40 MG: 40 TABLET, DELAYED RELEASE ORAL at 09:19

## 2019-08-01 NOTE — PROGRESS NOTES
General Surgery End of Shift Nursing Note    Bedside shift change report given to Linda Conrad RN (oncoming nurse) . Report included the following information SBAR, Kardex, Intake/Output, MAR and Recent Results. Shift worked:   2592-5827   Summary of shift:    Pain control getting better. Denies SOB. Issues for physician to address:        Number times ambulated in hallway past shift:     Number of times OOB to chair past shift: up ad erum    Pain Management:  Current medication: Dilaudid  Patient states pain is manageable on current pain medication: YES    GI:    Current diet:  DIET REGULAR    Tolerating current diet: YES  Passing flatus: YES  Last Bowel Movement:    Appearance:     Respiratory:    Incentive Spirometer at bedside: YES  Patient instructed on use: YES    Patient Safety:    Falls Score: 1  Bed Alarm On? No  Sitter?  No    Francine Carrero RN

## 2019-08-01 NOTE — ROUTINE PROCESS
General Surgery End of Shift Nursing Note Bedside shift change report given to ,RN (oncoming nurse) by [address] , RN (offgoing nurse). Report included the following information SBAR, Kardex, Intake/Output and MAR. Shift worked:   C Summary of shift:    Pt stated pain is 6/10. Continuing IVF and pain medication. Issues for physician to address:     
 
Number times ambulated in hallway past shift: 0 Number of times OOB to chair past shift: 4 Pain Management: 
Current medication: IV Dilaudid Patient states pain is manageable on current pain medication: NO 
 
GI: 
 
Current diet:  DIET REGULAR Tolerating current diet: YES Passing flatus: YES Last Bowel Movement: yesterday Appearance:  
 
Respiratory: 
 
Incentive Spirometer at bedside: YES Patient instructed on use: NO 
 
Patient Safety: 
 
Bed Alarm On? No 
Sitter?  No 
 
Ella Devi RN

## 2019-08-01 NOTE — PROGRESS NOTES
General Daily Progress Note    Admit Date: 7/27/2019    Subjective:     Patient continues to complain of pain. .    Current Facility-Administered Medications   Medication Dose Route Frequency    dextrose 5% - 0.45% NaCl with KCl 20 mEq/L infusion  100 mL/hr IntraVENous CONTINUOUS    fentaNYL (DURAGESIC) 75 mcg/hr patch 1 Patch  1 Patch TransDERmal Q72H    sodium chloride (NS) flush 5-40 mL  5-40 mL IntraVENous Q8H    sodium chloride (NS) flush 5-40 mL  5-40 mL IntraVENous PRN    acetaminophen (TYLENOL) tablet 650 mg  650 mg Oral Q6H PRN    naloxone (NARCAN) injection 0.4 mg  0.4 mg IntraVENous PRN    ondansetron (ZOFRAN) injection 4 mg  4 mg IntraVENous Q6H PRN    diphenhydrAMINE (BENADRYL) injection 25 mg  25 mg IntraVENous Q6H PRN    citalopram (CELEXA) tablet 10 mg  10 mg Oral QHS    folic acid (FOLVITE) tablet 1 mg  1 mg Oral QHS    ibuprofen (MOTRIN) tablet 400 mg  400 mg Oral Q6H PRN    pantoprazole (PROTONIX) tablet 40 mg  40 mg Oral ACB    HYDROmorphone (PF) (DILAUDID) injection 0.5 mg  0.5 mg IntraVENous Q3H PRN    docusate sodium (COLACE) capsule 100 mg  100 mg Oral BID    polyethylene glycol (MIRALAX) packet 17 g  17 g Oral DAILY PRN    losartan (COZAAR) tablet 50 mg  50 mg Oral QHS        Review of Systems  A comprehensive review of systems was negative. Objective:     Patient Vitals for the past 24 hrs:   BP Temp Pulse Resp SpO2   08/01/19 0651 123/79 98.9 °F (37.2 °C) 75 16 96 %   08/01/19 0343 114/60 98.5 °F (36.9 °C) 73 16 97 %   08/01/19 0029 130/71 99 °F (37.2 °C) 78 16 97 %   07/31/19 1917 124/80 99 °F (37.2 °C) 86 16 96 %   07/31/19 1510 135/81 99.7 °F (37.6 °C) 88 16 96 %   07/31/19 1128 130/74 99.5 °F (37.5 °C) 84 18 97 %     No intake/output data recorded.   07/30 1901 - 08/01 0700  In: 3965 [P.O.:960; I.V.:3005]  Out: 900 [Urine:900]    Physical Exam:   Visit Vitals  /79 (BP 1 Location: Right arm, BP Patient Position: At rest)   Pulse 75   Temp 98.9 °F (37.2 °C)   Resp 16   Ht 5' 10\" (1.778 m)   Wt 197 lb 1.5 oz (89.4 kg)   SpO2 96%   BMI 28.28 kg/m²     General appearance: alert, cooperative, no distress, appears stated age  Neck: supple, symmetrical, trachea midline, no adenopathy, thyroid: not enlarged, symmetric, no tenderness/mass/nodules, no carotid bruit and no JVD  Lungs: clear to auscultation bilaterally  Heart: regular rate and rhythm, S1, S2 normal, no murmur, click, rub or gallop  Abdomen: soft, non-tender. Bowel sounds normal. No masses,  no organomegaly  Extremities: extremities normal, atraumatic, no cyanosis or edema        Data Review No results found for this or any previous visit (from the past 24 hour(s)). Assessment:     Active Problems:    Sickle cell crisis ([facility] (5/22/2017)      [facility] (9/8/2018)        Plan:     1. Continue treatment of painful crises. Anticipate reducing analgesics tomorrow. 2.Will mobilize.

## 2019-08-02 LAB
ALBUMIN SERPL-MCNC: 3.4 G/DL (ref 3.5–5)
ALBUMIN/GLOB SERPL: 0.8 {RATIO} (ref 1.1–2.2)
ALP SERPL-CCNC: 120 U/L (ref 45–117)
ALT SERPL-CCNC: 21 U/L (ref 12–78)
ANION GAP SERPL CALC-SCNC: 5 MMOL/L (ref 5–15)
AST SERPL-CCNC: 41 U/L (ref 15–37)
BASOPHILS # BLD: 0 K/UL (ref 0–0.1)
BASOPHILS NFR BLD: 0 % (ref 0–1)
BILIRUB SERPL-MCNC: 1.4 MG/DL (ref 0.2–1)
BLASTS NFR BLD MANUAL: 0 %
BUN SERPL-MCNC: 8 MG/DL (ref 6–20)
BUN/CREAT SERPL: 10 (ref 12–20)
CALCIUM SERPL-MCNC: 8.3 MG/DL (ref 8.5–10.1)
CHLORIDE SERPL-SCNC: 105 MMOL/L (ref 97–108)
CO2 SERPL-SCNC: 27 MMOL/L (ref 21–32)
CREAT SERPL-MCNC: 0.83 MG/DL (ref 0.55–1.02)
DIFFERENTIAL METHOD BLD: ABNORMAL
EOSINOPHIL # BLD: 0.9 K/UL (ref 0–0.4)
EOSINOPHIL NFR BLD: 9 % (ref 0–7)
ERYTHROCYTE [DISTWIDTH] IN BLOOD BY AUTOMATED COUNT: 15.9 % (ref 11.5–14.5)
GLOBULIN SER CALC-MCNC: 4.4 G/DL (ref 2–4)
GLUCOSE SERPL-MCNC: 84 MG/DL (ref 65–100)
HCT VFR BLD AUTO: 24.1 % (ref 35–47)
HGB BLD-MCNC: 8.1 G/DL (ref 11.5–16)
IMM GRANULOCYTES # BLD AUTO: 0 K/UL
IMM GRANULOCYTES NFR BLD AUTO: 0 %
LYMPHOCYTES # BLD: 5 K/UL (ref 0.8–3.5)
LYMPHOCYTES NFR BLD: 53 % (ref 12–49)
MCH RBC QN AUTO: 32.4 PG (ref 26–34)
MCHC RBC AUTO-ENTMCNC: 33.6 G/DL (ref 30–36.5)
MCV RBC AUTO: 96.4 FL (ref 80–99)
METAMYELOCYTES NFR BLD MANUAL: 1 %
MONOCYTES # BLD: 0.6 K/UL (ref 0–1)
MONOCYTES NFR BLD: 6 % (ref 5–13)
MYELOCYTES NFR BLD MANUAL: 0 %
NEUTS BAND NFR BLD MANUAL: 0 % (ref 0–6)
NEUTS SEG # BLD: 2.9 K/UL (ref 1.8–8)
NEUTS SEG NFR BLD: 31 % (ref 32–75)
NRBC # BLD: 0.04 K/UL (ref 0–0.01)
NRBC BLD-RTO: 0.4 PER 100 WBC
OTHER CELLS NFR BLD MANUAL: 0 %
PLATELET # BLD AUTO: 195 K/UL (ref 150–400)
PMV BLD AUTO: 12.6 FL (ref 8.9–12.9)
POTASSIUM SERPL-SCNC: 4.2 MMOL/L (ref 3.5–5.1)
PROMYELOCYTES NFR BLD MANUAL: 0 %
PROT SERPL-MCNC: 7.8 G/DL (ref 6.4–8.2)
RBC # BLD AUTO: 2.5 M/UL (ref 3.8–5.2)
RBC MORPH BLD: ABNORMAL
SODIUM SERPL-SCNC: 137 MMOL/L (ref 136–145)
WBC # BLD AUTO: 9.5 K/UL (ref 3.6–11)

## 2019-08-02 PROCEDURE — 80053 COMPREHEN METABOLIC PANEL: CPT

## 2019-08-02 PROCEDURE — 65660000000 HC RM CCU STEPDOWN

## 2019-08-02 PROCEDURE — 74011250637 HC RX REV CODE- 250/637: Performed by: HOSPITALIST

## 2019-08-02 PROCEDURE — 74011250636 HC RX REV CODE- 250/636: Performed by: HOSPITALIST

## 2019-08-02 PROCEDURE — 94760 N-INVAS EAR/PLS OXIMETRY 1: CPT

## 2019-08-02 PROCEDURE — 74011250636 HC RX REV CODE- 250/636: Performed by: INTERNAL MEDICINE

## 2019-08-02 PROCEDURE — 85027 COMPLETE CBC AUTOMATED: CPT

## 2019-08-02 PROCEDURE — 36415 COLL VENOUS BLD VENIPUNCTURE: CPT

## 2019-08-02 PROCEDURE — 74011250637 HC RX REV CODE- 250/637: Performed by: INTERNAL MEDICINE

## 2019-08-02 RX ORDER — HYDROMORPHONE HYDROCHLORIDE 1 MG/ML
0.5 INJECTION, SOLUTION INTRAMUSCULAR; INTRAVENOUS; SUBCUTANEOUS
Status: DISCONTINUED | OUTPATIENT
Start: 2019-08-02 | End: 2019-08-05

## 2019-08-02 RX ADMIN — HYDROMORPHONE HYDROCHLORIDE 0.5 MG: 1 INJECTION, SOLUTION INTRAMUSCULAR; INTRAVENOUS; SUBCUTANEOUS at 13:08

## 2019-08-02 RX ADMIN — FOLIC ACID 1 MG: 1 TABLET ORAL at 21:25

## 2019-08-02 RX ADMIN — DIPHENHYDRAMINE HYDROCHLORIDE 25 MG: 50 INJECTION, SOLUTION INTRAMUSCULAR; INTRAVENOUS at 03:33

## 2019-08-02 RX ADMIN — Medication 10 ML: at 21:26

## 2019-08-02 RX ADMIN — DIPHENHYDRAMINE HYDROCHLORIDE 25 MG: 50 INJECTION, SOLUTION INTRAMUSCULAR; INTRAVENOUS at 16:13

## 2019-08-02 RX ADMIN — CITALOPRAM HYDROBROMIDE 10 MG: 20 TABLET ORAL at 21:25

## 2019-08-02 RX ADMIN — DOCUSATE SODIUM 100 MG: 100 CAPSULE, LIQUID FILLED ORAL at 17:33

## 2019-08-02 RX ADMIN — DOCUSATE SODIUM 100 MG: 100 CAPSULE, LIQUID FILLED ORAL at 09:31

## 2019-08-02 RX ADMIN — Medication 10 ML: at 17:34

## 2019-08-02 RX ADMIN — LACTULOSE 45 ML: 20 SOLUTION ORAL at 17:48

## 2019-08-02 RX ADMIN — DIPHENHYDRAMINE HYDROCHLORIDE 25 MG: 50 INJECTION, SOLUTION INTRAMUSCULAR; INTRAVENOUS at 23:31

## 2019-08-02 RX ADMIN — HYDROMORPHONE HYDROCHLORIDE 0.5 MG: 1 INJECTION, SOLUTION INTRAMUSCULAR; INTRAVENOUS; SUBCUTANEOUS at 09:31

## 2019-08-02 RX ADMIN — Medication 10 ML: at 05:38

## 2019-08-02 RX ADMIN — ONDANSETRON 4 MG: 2 INJECTION INTRAMUSCULAR; INTRAVENOUS at 17:36

## 2019-08-02 RX ADMIN — ONDANSETRON 4 MG: 2 INJECTION INTRAMUSCULAR; INTRAVENOUS at 09:31

## 2019-08-02 RX ADMIN — ONDANSETRON 4 MG: 2 INJECTION INTRAMUSCULAR; INTRAVENOUS at 23:31

## 2019-08-02 RX ADMIN — DIPHENHYDRAMINE HYDROCHLORIDE 25 MG: 50 INJECTION, SOLUTION INTRAMUSCULAR; INTRAVENOUS at 09:31

## 2019-08-02 RX ADMIN — HYDROMORPHONE HYDROCHLORIDE 0.5 MG: 1 INJECTION, SOLUTION INTRAMUSCULAR; INTRAVENOUS; SUBCUTANEOUS at 21:25

## 2019-08-02 RX ADMIN — HYDROMORPHONE HYDROCHLORIDE 0.5 MG: 1 INJECTION, SOLUTION INTRAMUSCULAR; INTRAVENOUS; SUBCUTANEOUS at 00:36

## 2019-08-02 RX ADMIN — HYDROMORPHONE HYDROCHLORIDE 0.5 MG: 1 INJECTION, SOLUTION INTRAMUSCULAR; INTRAVENOUS; SUBCUTANEOUS at 06:29

## 2019-08-02 RX ADMIN — DEXTROSE MONOHYDRATE, SODIUM CHLORIDE, AND POTASSIUM CHLORIDE 100 ML/HR: 50; 4.5; 1.49 INJECTION, SOLUTION INTRAVENOUS at 00:47

## 2019-08-02 RX ADMIN — PANTOPRAZOLE SODIUM 40 MG: 40 TABLET, DELAYED RELEASE ORAL at 09:31

## 2019-08-02 RX ADMIN — HYDROMORPHONE HYDROCHLORIDE 0.5 MG: 1 INJECTION, SOLUTION INTRAMUSCULAR; INTRAVENOUS; SUBCUTANEOUS at 17:35

## 2019-08-02 RX ADMIN — DEXTROSE MONOHYDRATE, SODIUM CHLORIDE, AND POTASSIUM CHLORIDE 100 ML/HR: 50; 4.5; 1.49 INJECTION, SOLUTION INTRAVENOUS at 09:49

## 2019-08-02 RX ADMIN — ONDANSETRON 4 MG: 2 INJECTION INTRAMUSCULAR; INTRAVENOUS at 03:29

## 2019-08-02 RX ADMIN — HYDROMORPHONE HYDROCHLORIDE 0.5 MG: 1 INJECTION, SOLUTION INTRAMUSCULAR; INTRAVENOUS; SUBCUTANEOUS at 03:32

## 2019-08-02 NOTE — PROGRESS NOTES
Plan:  -Home  -Family to transport      11:45AM  Pt continues to struggle with pain control. Attending addressing. Mobilizing. No home needs identified at this time. CM will continue to follow and assist with d/c planning.        Deonna Garcia, MSW  Care Manager

## 2019-08-02 NOTE — ROUTINE PROCESS
Bedside and Verbal shift change report given to [address],JENNIws (oncoming nurse) by Americo Pike (offgoing nurse). Report included the following information SBAR, MAR I/O and pain administration. Pt complained of pain several times during shift. Dilaudid, Zofran and Benadryl administered as ordered. All meals tolerated; no vomiting. Up adlib to the bathroom.

## 2019-08-02 NOTE — PROGRESS NOTES
General Daily Progress Note    Admit Date: 7/27/2019    Subjective:     Patient continues to complain of pelvic and back pain. Current Facility-Administered Medications   Medication Dose Route Frequency    HYDROmorphone (PF) (DILAUDID) injection 0.5 mg  0.5 mg IntraVENous Q4H PRN    lactulose (CHRONULAC) 10 gram/15 mL solution 45 mL  30 g Oral DAILY PRN    dextrose 5% - 0.45% NaCl with KCl 20 mEq/L infusion  100 mL/hr IntraVENous CONTINUOUS    fentaNYL (DURAGESIC) 75 mcg/hr patch 1 Patch  1 Patch TransDERmal Q72H    sodium chloride (NS) flush 5-40 mL  5-40 mL IntraVENous Q8H    sodium chloride (NS) flush 5-40 mL  5-40 mL IntraVENous PRN    acetaminophen (TYLENOL) tablet 650 mg  650 mg Oral Q6H PRN    naloxone (NARCAN) injection 0.4 mg  0.4 mg IntraVENous PRN    ondansetron (ZOFRAN) injection 4 mg  4 mg IntraVENous Q6H PRN    diphenhydrAMINE (BENADRYL) injection 25 mg  25 mg IntraVENous Q6H PRN    citalopram (CELEXA) tablet 10 mg  10 mg Oral QHS    folic acid (FOLVITE) tablet 1 mg  1 mg Oral QHS    ibuprofen (MOTRIN) tablet 400 mg  400 mg Oral Q6H PRN    pantoprazole (PROTONIX) tablet 40 mg  40 mg Oral ACB    docusate sodium (COLACE) capsule 100 mg  100 mg Oral BID    polyethylene glycol (MIRALAX) packet 17 g  17 g Oral DAILY PRN    losartan (COZAAR) tablet 50 mg  50 mg Oral QHS        Review of Systems  A comprehensive review of systems was negative. Objective:     Patient Vitals for the past 24 hrs:   BP Temp Pulse Resp SpO2   08/02/19 0818 118/81 98.4 °F (36.9 °C) 74 20 96 %   08/02/19 0427 124/75 98.8 °F (37.1 °C) 84 18 98 %   08/01/19 2356 121/69 99.1 °F (37.3 °C) 88 16 96 %   08/01/19 1921 121/69 99.2 °F (37.3 °C) 90 16 100 %   08/01/19 1542 132/72 98.7 °F (37.1 °C) 87 16 98 %   08/01/19 1140 96/58 98.4 °F (36.9 °C) 77 16 98 %     No intake/output data recorded. 07/31 1901 - 08/02 0700  In: 5341.7 [P.O.:1800;  I.V.:3541.7]  Out: 900 [Urine:900]    Physical Exam:   Visit Vitals  /81 (BP 1 Location: Left arm, BP Patient Position: Sitting)   Pulse 74   Temp 98.4 °F (36.9 °C)   Resp 20   Ht 5' 10\" (1.778 m)   Wt 197 lb 1.5 oz (89.4 kg)   SpO2 96%   BMI 28.28 kg/m²     General appearance: alert, cooperative, no distress, appears stated age  Neck: supple, symmetrical, trachea midline, no adenopathy, thyroid: not enlarged, symmetric, no tenderness/mass/nodules, no carotid bruit and no JVD  Lungs: clear to auscultation bilaterally  Heart: regular rate and rhythm, S1, S2 normal, no murmur, click, rub or gallop  Abdomen: soft, non-tender. Bowel sounds normal. No masses,  no organomegaly  Extremities: extremities normal, atraumatic, no cyanosis or edema        Data Review   Recent Results (from the past 24 hour(s))   CBC WITH MANUAL DIFF    Collection Time: 08/02/19  1:13 AM   Result Value Ref Range    WBC 9.5 3.6 - 11.0 K/uL    RBC 2.50 (L) 3.80 - 5.20 M/uL    HGB 8.1 (L) 11.5 - 16.0 g/dL    HCT 24.1 (L) 35.0 - 47.0 %    MCV 96.4 80.0 - 99.0 FL    MCH 32.4 26.0 - 34.0 PG    MCHC 33.6 30.0 - 36.5 g/dL    RDW 15.9 (H) 11.5 - 14.5 %    PLATELET 197 066 - 755 K/uL    MPV 12.6 8.9 - 12.9 FL    NRBC 0.4 (H) 0  WBC    ABSOLUTE NRBC 0.04 (H) 0.00 - 0.01 K/uL    NEUTROPHILS 31 (L) 32 - 75 %    BAND NEUTROPHILS 0 0 - 6 %    LYMPHOCYTES 53 (H) 12 - 49 %    MONOCYTES 6 5 - 13 %    EOSINOPHILS 9 (H) 0 - 7 %    BASOPHILS 0 0 - 1 %    METAMYELOCYTES 1 (H) 0 %    MYELOCYTES 0 0 %    PROMYELOCYTES 0 0 %    BLASTS 0 0 %    OTHER CELL 0 0      IMMATURE GRANULOCYTES 0 %    ABS. NEUTROPHILS 2.9 1.8 - 8.0 K/UL    ABS. LYMPHOCYTES 5.0 (H) 0.8 - 3.5 K/UL    ABS. MONOCYTES 0.6 0.0 - 1.0 K/UL    ABS. EOSINOPHILS 0.9 (H) 0.0 - 0.4 K/UL    ABS. BASOPHILS 0.0 0.0 - 0.1 K/UL    ABS. IMM.  GRANS. 0.0 K/UL    DF MANUAL      RBC COMMENTS TARGET CELLS  1+        RBC COMMENTS SICKLE CELLS  2+        RBC COMMENTS HYPOCHROMIA  1+       METABOLIC PANEL, COMPREHENSIVE    Collection Time: 08/02/19  3:45 AM   Result Value Ref Range Sodium 137 136 - 145 mmol/L    Potassium 4.2 3.5 - 5.1 mmol/L    Chloride 105 97 - 108 mmol/L    CO2 27 21 - 32 mmol/L    Anion gap 5 5 - 15 mmol/L    Glucose 84 65 - 100 mg/dL    BUN 8 6 - 20 MG/DL    Creatinine 0.83 0.55 - 1.02 MG/DL    BUN/Creatinine ratio 10 (L) 12 - 20      GFR est AA >60 >60 ml/min/1.73m2    GFR est non-AA >60 >60 ml/min/1.73m2    Calcium 8.3 (L) 8.5 - 10.1 MG/DL    Bilirubin, total 1.4 (H) 0.2 - 1.0 MG/DL    ALT (SGPT) 21 12 - 78 U/L    AST (SGOT) 41 (H) 15 - 37 U/L    Alk. phosphatase 120 (H) 45 - 117 U/L    Protein, total 7.8 6.4 - 8.2 g/dL    Albumin 3.4 (L) 3.5 - 5.0 g/dL    Globulin 4.4 (H) 2.0 - 4.0 g/dL    A-G Ratio 0.8 (L) 1.1 - 2.2             Assessment:     Active Problems:    Sickle cell crisis ([facility] (5/22/2017)      Sickle cell pain crisis ([facility] (9/8/2018)        Plan:     1. He continues to complain of pain but I will reduce her Dilaudid for now. 2.  Will cont to mobilize.

## 2019-08-03 PROCEDURE — 94760 N-INVAS EAR/PLS OXIMETRY 1: CPT

## 2019-08-03 PROCEDURE — 74011250636 HC RX REV CODE- 250/636: Performed by: HOSPITALIST

## 2019-08-03 PROCEDURE — 74011250637 HC RX REV CODE- 250/637: Performed by: HOSPITALIST

## 2019-08-03 PROCEDURE — 65660000000 HC RM CCU STEPDOWN

## 2019-08-03 PROCEDURE — 74011250636 HC RX REV CODE- 250/636: Performed by: INTERNAL MEDICINE

## 2019-08-03 PROCEDURE — 74011250637 HC RX REV CODE- 250/637: Performed by: INTERNAL MEDICINE

## 2019-08-03 RX ADMIN — LACTULOSE 45 ML: 20 SOLUTION ORAL at 09:54

## 2019-08-03 RX ADMIN — DEXTROSE MONOHYDRATE, SODIUM CHLORIDE, AND POTASSIUM CHLORIDE 100 ML/HR: 50; 4.5; 1.49 INJECTION, SOLUTION INTRAVENOUS at 23:55

## 2019-08-03 RX ADMIN — DOCUSATE SODIUM 100 MG: 100 CAPSULE, LIQUID FILLED ORAL at 09:39

## 2019-08-03 RX ADMIN — DIPHENHYDRAMINE HYDROCHLORIDE 25 MG: 50 INJECTION, SOLUTION INTRAMUSCULAR; INTRAVENOUS at 11:58

## 2019-08-03 RX ADMIN — Medication 10 ML: at 21:58

## 2019-08-03 RX ADMIN — DIPHENHYDRAMINE HYDROCHLORIDE 25 MG: 50 INJECTION, SOLUTION INTRAMUSCULAR; INTRAVENOUS at 18:01

## 2019-08-03 RX ADMIN — DEXTROSE MONOHYDRATE, SODIUM CHLORIDE, AND POTASSIUM CHLORIDE 100 ML/HR: 50; 4.5; 1.49 INJECTION, SOLUTION INTRAVENOUS at 05:25

## 2019-08-03 RX ADMIN — DOCUSATE SODIUM 100 MG: 100 CAPSULE, LIQUID FILLED ORAL at 18:01

## 2019-08-03 RX ADMIN — DEXTROSE MONOHYDRATE, SODIUM CHLORIDE, AND POTASSIUM CHLORIDE 100 ML/HR: 50; 4.5; 1.49 INJECTION, SOLUTION INTRAVENOUS at 15:45

## 2019-08-03 RX ADMIN — HYDROMORPHONE HYDROCHLORIDE 0.5 MG: 1 INJECTION, SOLUTION INTRAMUSCULAR; INTRAVENOUS; SUBCUTANEOUS at 13:46

## 2019-08-03 RX ADMIN — DIPHENHYDRAMINE HYDROCHLORIDE 25 MG: 50 INJECTION, SOLUTION INTRAMUSCULAR; INTRAVENOUS at 23:55

## 2019-08-03 RX ADMIN — FOLIC ACID 1 MG: 1 TABLET ORAL at 21:58

## 2019-08-03 RX ADMIN — ONDANSETRON 4 MG: 2 INJECTION INTRAMUSCULAR; INTRAVENOUS at 05:26

## 2019-08-03 RX ADMIN — DIPHENHYDRAMINE HYDROCHLORIDE 25 MG: 50 INJECTION, SOLUTION INTRAMUSCULAR; INTRAVENOUS at 05:26

## 2019-08-03 RX ADMIN — ONDANSETRON 4 MG: 2 INJECTION INTRAMUSCULAR; INTRAVENOUS at 18:00

## 2019-08-03 RX ADMIN — HYDROMORPHONE HYDROCHLORIDE 0.5 MG: 1 INJECTION, SOLUTION INTRAMUSCULAR; INTRAVENOUS; SUBCUTANEOUS at 05:26

## 2019-08-03 RX ADMIN — CITALOPRAM HYDROBROMIDE 10 MG: 20 TABLET ORAL at 21:58

## 2019-08-03 RX ADMIN — HYDROMORPHONE HYDROCHLORIDE 0.5 MG: 1 INJECTION, SOLUTION INTRAMUSCULAR; INTRAVENOUS; SUBCUTANEOUS at 18:00

## 2019-08-03 RX ADMIN — HYDROMORPHONE HYDROCHLORIDE 0.5 MG: 1 INJECTION, SOLUTION INTRAMUSCULAR; INTRAVENOUS; SUBCUTANEOUS at 09:39

## 2019-08-03 RX ADMIN — Medication 10 ML: at 18:00

## 2019-08-03 RX ADMIN — HYDROMORPHONE HYDROCHLORIDE 0.5 MG: 1 INJECTION, SOLUTION INTRAMUSCULAR; INTRAVENOUS; SUBCUTANEOUS at 01:29

## 2019-08-03 RX ADMIN — PANTOPRAZOLE SODIUM 40 MG: 40 TABLET, DELAYED RELEASE ORAL at 05:26

## 2019-08-03 RX ADMIN — HYDROMORPHONE HYDROCHLORIDE 0.5 MG: 1 INJECTION, SOLUTION INTRAMUSCULAR; INTRAVENOUS; SUBCUTANEOUS at 21:58

## 2019-08-03 RX ADMIN — ONDANSETRON 4 MG: 2 INJECTION INTRAMUSCULAR; INTRAVENOUS at 11:58

## 2019-08-03 RX ADMIN — LOSARTAN POTASSIUM 50 MG: 50 TABLET ORAL at 21:58

## 2019-08-03 RX ADMIN — ONDANSETRON 4 MG: 2 INJECTION INTRAMUSCULAR; INTRAVENOUS at 23:55

## 2019-08-03 NOTE — ROUTINE PROCESS
Bedside shift change report given to [address],7Gws (oncoming nurse) by Ange Dang (offgoing nurse). Report included the following information SBAR, Kardex, Intake/Output and MAR  
 
PRN meds given as ordered. Pt had a large BM. Family came to visit. Ambulated independently in the hallway

## 2019-08-03 NOTE — PROGRESS NOTES
General Surgery End of Shift Nursing Note    Bedside shift change report given to Arminda Dominguez, SAMIR (oncoming nurse) by [address] Zam[address]ano RN (offgoing nurse). Report included the following information SBAR and Recent Results. Shift worked:   [id number]   Summary of shift:    No significant events overnight. VSS. Medicated for pain with 0.5mg IV dilaudid q 4 hrs. Up ad erum to the bathroom. Patient slept in between care.     Issues for physician to address:   none         Cielo Gonzáles, RN

## 2019-08-03 NOTE — PROGRESS NOTES
Initial Nutrition Assessment:    INTERVENTIONS/RECOMMENDATIONS:   · Meals/Snacks: General/healthful diet: Continue regular diet    ASSESSMENT:   Patient medically noted for sickle cell pain crisis. PMH HTN, depression, and GERD. Chart reviewed for length of stay. Patient reports a good appetite and eating well. PO % of meals per flowsheets. No recent weight changes. Last labs WNL. No recent BM but bowel regimen noted; utilize PRN medications. No nutrition questions/concerns from patient. Diet Order: Regular  % Eaten:    Patient Vitals for the past 72 hrs:   % Diet Eaten   08/03/19 0942 100 %   08/02/19 1742 75 %   08/02/19 1241 100 %   08/02/19 0940 100 %   08/01/19 1700 100 %   08/01/19 1300 75 %   08/01/19 0900 100 %       Pertinent Medications: [x]Reviewed []Other: Celexa, Colace, Folic Acid, Protonix, PRN Lactulose and miralax  Pertinent Labs: [x]Reviewed []Other:   Food Allergies: [x]None []Yes:    Last BM: 7/26   [x]Active     []Hyperactive  []Hypoactive       [] Absent BS  Skin:    [x] Intact   [] Incision  [] Breakdown: [] Edema []Other:    Anthropometrics:   Height: 5' 10\" (177.8 cm) Weight: 89.4 kg (197 lb 1.5 oz)   IBW (%IBW):   ( ) UBW (%UBW):   (  %)   Last Weight Metrics:  Weight Loss Metrics 7/27/2019 7/26/2019 6/25/2019 6/14/2019 5/26/2019 5/25/2019 5/9/2019   Today's Wt 197 lb 1.5 oz 199 lb 12.8 oz 197 lb 14.4 oz 196 lb 14.4 oz 194 lb 0.1 oz 194 lb 0.1 oz 193 lb 3.2 oz   BMI 28.28 kg/m2 28.67 kg/m2 28.4 kg/m2 28.25 kg/m2 27.84 kg/m2 27.84 kg/m2 26.95 kg/m2       BMI: Body mass index is 28.28 kg/m². This BMI is indicative of:   []Underweight    []Normal    [x]Overweight    [] Obesity   [] Extreme Obesity (BMI>40)     Estimated Nutrition Needs (Based on):   2041 Kcals/day(BMR (1570) x 1. 3AF) , 72 g(0.8 g/kg bw) Protein  Carbohydrate:  At Least 130 g/day  Fluids: 2050 mL/day (1ml/kcal)    Pt expected to meet estimated nutrient needs: [x]Yes []No    NUTRITION DIAGNOSES:   Problem:  No nutritional diagnosis at this time      Etiology: related to       Signs/Symptoms: as evidenced by        NUTRITION INTERVENTIONS:  Meals/Snacks: General/healthful diet                  GOAL:   PO intake >75% of meals next 5-7 days    LEARNING NEEDS (Diet, Food/Nutrient-Drug Interaction):    [x] None Identified   [] Identified and Education Provided/Documented   [] Identified and Pt declined/was not appropriate     Cultural, Amish, OR Ethnic Dietary Needs:    [x] None Identified   [] Identified and Addressed     [x] Interdisciplinary Care Plan Reviewed/Documented    [x] Discharge Planning: Regular diet       MONITORING /EVALUATION:   Food/Nutrient Intake Outcomes:  Total energy intake  Physical Signs/Symptoms Outcomes: Weight/weight change    NUTRITION RISK:    [] Patient At Nutritional Risk              [x] Patient Not at Nutritional Risk    PT SEEN FOR:    []  MD Consult: []Calorie Count      []Diabetic Diet Education        []Diet Education     []Electrolyte Management     []General Nutrition Management and Supplements     []Management of Tube Feeding     []TPN Recommendations    []  RN Referral:  []MST score >=2     []Enteral/Parenteral Nutrition PTA     []Pregnant: Gestational DM or Multigestation     []Pressure Ulcer/Wound Care needs        []  Low BMI  [x]  KIRILL Weiss [phone]  Pager [phone]    Weekend Pager [phone]

## 2019-08-03 NOTE — PROGRESS NOTES
General Daily Progress Note    Admit Date: 7/27/2019    Subjective:     Patient continues to complain of pelvic shoulder pain    Current Facility-Administered Medications   Medication Dose Route Frequency    HYDROmorphone (PF) (DILAUDID) injection 0.5 mg  0.5 mg IntraVENous Q4H PRN    lactulose (CHRONULAC) 10 gram/15 mL solution 45 mL  30 g Oral DAILY PRN    dextrose 5% - 0.45% NaCl with KCl 20 mEq/L infusion  100 mL/hr IntraVENous CONTINUOUS    fentaNYL (DURAGESIC) 75 mcg/hr patch 1 Patch  1 Patch TransDERmal Q72H    sodium chloride (NS) flush 5-40 mL  5-40 mL IntraVENous Q8H    sodium chloride (NS) flush 5-40 mL  5-40 mL IntraVENous PRN    acetaminophen (TYLENOL) tablet 650 mg  650 mg Oral Q6H PRN    naloxone (NARCAN) injection 0.4 mg  0.4 mg IntraVENous PRN    ondansetron (ZOFRAN) injection 4 mg  4 mg IntraVENous Q6H PRN    diphenhydrAMINE (BENADRYL) injection 25 mg  25 mg IntraVENous Q6H PRN    citalopram (CELEXA) tablet 10 mg  10 mg Oral QHS    folic acid (FOLVITE) tablet 1 mg  1 mg Oral QHS    ibuprofen (MOTRIN) tablet 400 mg  400 mg Oral Q6H PRN    pantoprazole (PROTONIX) tablet 40 mg  40 mg Oral ACB    docusate sodium (COLACE) capsule 100 mg  100 mg Oral BID    polyethylene glycol (MIRALAX) packet 17 g  17 g Oral DAILY PRN    losartan (COZAAR) tablet 50 mg  50 mg Oral QHS        Review of Systems  A comprehensive review of systems was negative.     Objective:     Patient Vitals for the past 24 hrs:   BP Temp Pulse Resp SpO2   08/03/19 0711 125/66 98.7 °F (37.1 °C) 83 18 93 %   08/03/19 0355 126/84 99.8 °F (37.7 °C) 85 18 95 %   08/02/19 2303 104/83 99.4 °F (37.4 °C) 85 18 98 %   08/02/19 1920 104/60 99.3 °F (37.4 °C) 85 18 99 %   08/02/19 1730 127/74 99.6 °F (37.6 °C) 86 19 100 %   08/02/19 1026 118/71 99.3 °F (37.4 °C) 81 20 94 %     08/03 0701 - 08/03 1900  In: 620 [P.O.:620]  Out: -   08/01 1901 - 08/03 0700  In: 4323.3 [P.O.:1980; I.V.:2343.3]  Out: 0     Physical Exam:   Visit Vitals  BP 125/66   Pulse 83   Temp 98.7 °F (37.1 °C)   Resp 18   Ht 5' 10\" (1.778 m)   Wt 197 lb 1.5 oz (89.4 kg)   SpO2 93%   BMI 28.28 kg/m²     General appearance: alert, cooperative, no distress, appears stated age  Neck: supple, symmetrical, trachea midline, no adenopathy, thyroid: not enlarged, symmetric, no tenderness/mass/nodules, no carotid bruit and no JVD  Lungs: clear to auscultation bilaterally  Heart: regular rate and rhythm, S1, S2 normal, no murmur, click, rub or gallop  Abdomen: soft, non-tender. Bowel sounds normal. No masses,  no organomegaly  Extremities: extremities normal, atraumatic, no cyanosis or edema        Data Review   No results found for this or any previous visit (from the past 24 hour(s)). Assessment:     Active Problems:    Sickle cell crisis ([facility] [address] (5/22/2017)      S[facility] crisis ([facility] (9/8/2018)        Plan:     1. He continues to complain but current dose of analgesics allows her to be comfortable   2. Will cont to mobilize.

## 2019-08-04 PROCEDURE — 94760 N-INVAS EAR/PLS OXIMETRY 1: CPT

## 2019-08-04 PROCEDURE — 74011250636 HC RX REV CODE- 250/636: Performed by: INTERNAL MEDICINE

## 2019-08-04 PROCEDURE — 65660000000 HC RM CCU STEPDOWN

## 2019-08-04 PROCEDURE — 74011250636 HC RX REV CODE- 250/636: Performed by: HOSPITALIST

## 2019-08-04 PROCEDURE — 74011250637 HC RX REV CODE- 250/637: Performed by: HOSPITALIST

## 2019-08-04 RX ADMIN — HYDROMORPHONE HYDROCHLORIDE 0.5 MG: 1 INJECTION, SOLUTION INTRAMUSCULAR; INTRAVENOUS; SUBCUTANEOUS at 21:55

## 2019-08-04 RX ADMIN — HYDROMORPHONE HYDROCHLORIDE 0.5 MG: 1 INJECTION, SOLUTION INTRAMUSCULAR; INTRAVENOUS; SUBCUTANEOUS at 14:34

## 2019-08-04 RX ADMIN — LOSARTAN POTASSIUM 50 MG: 50 TABLET ORAL at 21:55

## 2019-08-04 RX ADMIN — HYDROMORPHONE HYDROCHLORIDE 0.5 MG: 1 INJECTION, SOLUTION INTRAMUSCULAR; INTRAVENOUS; SUBCUTANEOUS at 02:00

## 2019-08-04 RX ADMIN — PANTOPRAZOLE SODIUM 40 MG: 40 TABLET, DELAYED RELEASE ORAL at 05:59

## 2019-08-04 RX ADMIN — HYDROMORPHONE HYDROCHLORIDE 0.5 MG: 1 INJECTION, SOLUTION INTRAMUSCULAR; INTRAVENOUS; SUBCUTANEOUS at 10:00

## 2019-08-04 RX ADMIN — DOCUSATE SODIUM 100 MG: 100 CAPSULE, LIQUID FILLED ORAL at 18:10

## 2019-08-04 RX ADMIN — HYDROMORPHONE HYDROCHLORIDE 0.5 MG: 1 INJECTION, SOLUTION INTRAMUSCULAR; INTRAVENOUS; SUBCUTANEOUS at 18:05

## 2019-08-04 RX ADMIN — Medication 5 ML: at 10:02

## 2019-08-04 RX ADMIN — Medication 10 ML: at 18:05

## 2019-08-04 RX ADMIN — HYDROMORPHONE HYDROCHLORIDE 0.5 MG: 1 INJECTION, SOLUTION INTRAMUSCULAR; INTRAVENOUS; SUBCUTANEOUS at 05:58

## 2019-08-04 RX ADMIN — ONDANSETRON 4 MG: 2 INJECTION INTRAMUSCULAR; INTRAVENOUS at 18:05

## 2019-08-04 RX ADMIN — ONDANSETRON 4 MG: 2 INJECTION INTRAMUSCULAR; INTRAVENOUS at 12:02

## 2019-08-04 RX ADMIN — DIPHENHYDRAMINE HYDROCHLORIDE 25 MG: 50 INJECTION, SOLUTION INTRAMUSCULAR; INTRAVENOUS at 05:58

## 2019-08-04 RX ADMIN — DOCUSATE SODIUM 100 MG: 100 CAPSULE, LIQUID FILLED ORAL at 10:00

## 2019-08-04 RX ADMIN — DIPHENHYDRAMINE HYDROCHLORIDE 25 MG: 50 INJECTION, SOLUTION INTRAMUSCULAR; INTRAVENOUS at 12:02

## 2019-08-04 RX ADMIN — CITALOPRAM HYDROBROMIDE 10 MG: 20 TABLET ORAL at 21:56

## 2019-08-04 RX ADMIN — DEXTROSE MONOHYDRATE, SODIUM CHLORIDE, AND POTASSIUM CHLORIDE 75 ML/HR: 50; 4.5; 1.49 INJECTION, SOLUTION INTRAVENOUS at 22:01

## 2019-08-04 RX ADMIN — ONDANSETRON 4 MG: 2 INJECTION INTRAMUSCULAR; INTRAVENOUS at 05:58

## 2019-08-04 RX ADMIN — ONDANSETRON 4 MG: 2 INJECTION INTRAMUSCULAR; INTRAVENOUS at 23:57

## 2019-08-04 RX ADMIN — DEXTROSE MONOHYDRATE, SODIUM CHLORIDE, AND POTASSIUM CHLORIDE 75 ML/HR: 50; 4.5; 1.49 INJECTION, SOLUTION INTRAVENOUS at 10:01

## 2019-08-04 RX ADMIN — DIPHENHYDRAMINE HYDROCHLORIDE 25 MG: 50 INJECTION, SOLUTION INTRAMUSCULAR; INTRAVENOUS at 18:05

## 2019-08-04 RX ADMIN — DIPHENHYDRAMINE HYDROCHLORIDE 25 MG: 50 INJECTION, SOLUTION INTRAMUSCULAR; INTRAVENOUS at 23:57

## 2019-08-04 RX ADMIN — FOLIC ACID 1 MG: 1 TABLET ORAL at 21:55

## 2019-08-04 NOTE — PROGRESS NOTES
Spiritual Care Assessment/Progress Note  [facility]      NAME: Rika Howell      MRN: [id number]  AGE: 66 y.o. SEX: female  Mormon Affiliation: Non Buddhism   Language: English     8/4/2019     Total Time (in minutes): 21     Spiritual Assessment begun in [facility] through conversation with:         [x]Patient        [] Family    [] Friend(s)        Reason for Consult: Initial/Spiritual assessment, patient floor     Spiritual beliefs: (Please include comment if needed)     [] Identifies with a zay tradition:         [x] Supported by a zay community:            [] Claims no spiritual orientation:           [] Seeking spiritual identity:                [] Adheres to an individual form of spirituality:           [] Not able to assess:                           Identified resources for coping:      [x] Prayer                               [] Music                  [] Guided Imagery     [] Family/friends                 [] Pet visits     [] Devotional reading                         [] Unknown     [] Other:                                             Interventions offered during this visit: (See comments for more details)    Patient Interventions: Affirmation of emotions/emotional suffering, Iconic (affirming the presence of God/Higher Power), Prayer (assurance of)           Plan of Care:     [x] Support spiritual and/or cultural needs    [] Support AMD and/or advance care planning process      [] Support grieving process   [] Coordinate Rites and/or Rituals    [] Coordination with community clergy   [x] No spiritual needs identified at this time   [] Detailed Plan of Care below (See Comments)  [] Make referral to Music Therapy  [] Make referral to Pet Therapy     [] Make referral to Addiction services  [] Make referral to [facility]  [] Make referral to Spiritual Care Partner  [] No future visits requested        [x] Follow up visits as needed     Comments:   The patient was visited in [place] [address] to conduct a spiritual assessment. The patient was alone in a darkened room with the TV on, and enjoying a snack. The patient was not very engaging in conversation. I provided information about the spiritual care that is available. The patient received the information politely. I recommend that the patient be seen on a routine basis to offer spiritual care and support. Rev.  Talya Greene EdD MDiv     For  Assistance Page [phone]

## 2019-08-04 NOTE — PROGRESS NOTES
General Daily Progress Note    Admit Date: 7/27/2019    Subjective:     Patient notes improvement of the pain    Current Facility-Administered Medications   Medication Dose Route Frequency    HYDROmorphone (PF) (DILAUDID) injection 0.5 mg  0.5 mg IntraVENous Q4H PRN    lactulose (CHRONULAC) 10 gram/15 mL solution 45 mL  30 g Oral DAILY PRN    dextrose 5% - 0.45% NaCl with KCl 20 mEq/L infusion  100 mL/hr IntraVENous CONTINUOUS    fentaNYL (DURAGESIC) 75 mcg/hr patch 1 Patch  1 Patch TransDERmal Q72H    sodium chloride (NS) flush 5-40 mL  5-40 mL IntraVENous Q8H    sodium chloride (NS) flush 5-40 mL  5-40 mL IntraVENous PRN    acetaminophen (TYLENOL) tablet 650 mg  650 mg Oral Q6H PRN    naloxone (NARCAN) injection 0.4 mg  0.4 mg IntraVENous PRN    ondansetron (ZOFRAN) injection 4 mg  4 mg IntraVENous Q6H PRN    diphenhydrAMINE (BENADRYL) injection 25 mg  25 mg IntraVENous Q6H PRN    citalopram (CELEXA) tablet 10 mg  10 mg Oral QHS    folic acid (FOLVITE) tablet 1 mg  1 mg Oral QHS    ibuprofen (MOTRIN) tablet 400 mg  400 mg Oral Q6H PRN    pantoprazole (PROTONIX) tablet 40 mg  40 mg Oral ACB    docusate sodium (COLACE) capsule 100 mg  100 mg Oral BID    polyethylene glycol (MIRALAX) packet 17 g  17 g Oral DAILY PRN    losartan (COZAAR) tablet 50 mg  50 mg Oral QHS        Review of Systems  A comprehensive review of systems was negative.     Objective:     Patient Vitals for the past 24 hrs:   BP Temp Pulse Resp SpO2   08/04/19 0324 98/77 99.3 °F (37.4 °C) 81 18 97 %   08/04/19 0001 139/83 99.8 °F (37.7 °C) 87 18 97 %   08/03/19 1823 129/73 99.5 °F (37.5 °C) 91 18 95 %   08/03/19 1437 125/71       08/03/19 1435 118/71 99.3 °F (37.4 °C) 87 18 96 %   08/03/19 1110 133/72 98.9 °F (37.2 °C) 79 18 96 %   08/03/19 0711 125/66 98.7 °F (37.1 °C) 83 18 93 %     08/04 0701 - 08/04 1900  In: 371.7 [I.V.:371.7]  Out: -   08/02 1901 - 08/04 0700  In: 6919 [P.O.:1460; I.V.:3230]  Out: 0     Physical Exam: Visit Vitals  BP 98/77   Pulse 81   Temp 99.3 °F (37.4 °C)   Resp 18   Ht 5' 10\" (1.778 m)   Wt 197 lb 1.5 oz (89.4 kg)   SpO2 97%   BMI 28.28 kg/m²     General appearance: alert, cooperative, no distress, appears stated age  Neck: supple, symmetrical, trachea midline, no adenopathy, thyroid: not enlarged, symmetric, no tenderness/mass/nodules, no carotid bruit and no JVD  Lungs: clear to auscultation bilaterally  Heart: regular rate and rhythm, S1, S2 normal, no murmur, click, rub or gallop  Abdomen: soft, non-tender. Bowel sounds normal. No masses,  no organomegaly  Extremities: extremities normal, atraumatic, no cyanosis or edema        Data Review   No results found for this or any previous visit (from the past 24 hour(s)). Assessment:     Active Problems:    Sickle cell crisis ([facility] [address] (5/22/2017)      Sickle cell pain crisis ([facility] (9/8/2018)        Plan:     1. He continues to complain but current dose of analgesics allows her to be comfortable   2. Will cont to mobilize.

## 2019-08-04 NOTE — ROUTINE PROCESS
Bedside and Verbal shift change report given to [address],7Gws (oncoming nurse) by Arminda Dominguez (offgoing nurse). Report included the following information SBAR, Kardex, Intake/Output and MAR. Pt tolerated all meals. Up adlib to the bathroom. NSR -SV rhythm on cardiac monitor. Pain meds given as ordered.

## 2019-08-04 NOTE — PROGRESS NOTES
General Surgery End of Shift Nursing Note    Bedside shift change report given to Americo Pike RN (oncoming nurse) by Desiree Barajas RN (offgoing nurse). Report included the following information SBAR and Recent Results. Shift worked:   [id number]   Summary of shift:    No significant events overnight. Patient expressed adequate pain relief from current regimen. Slept in between care.    Issues for physician to address:   None         Bakari Negrete, RN

## 2019-08-05 LAB
ANION GAP SERPL CALC-SCNC: 3 MMOL/L (ref 5–15)
BASOPHILS # BLD: 0 K/UL (ref 0–0.1)
BASOPHILS NFR BLD: 0 % (ref 0–1)
BUN SERPL-MCNC: 5 MG/DL (ref 6–20)
BUN/CREAT SERPL: 7 (ref 12–20)
CALCIUM SERPL-MCNC: 8.3 MG/DL (ref 8.5–10.1)
CHLORIDE SERPL-SCNC: 106 MMOL/L (ref 97–108)
CO2 SERPL-SCNC: 30 MMOL/L (ref 21–32)
CREAT SERPL-MCNC: 0.76 MG/DL (ref 0.55–1.02)
DIFFERENTIAL METHOD BLD: ABNORMAL
EOSINOPHIL # BLD: 0.5 K/UL (ref 0–0.4)
EOSINOPHIL NFR BLD: 5 % (ref 0–7)
ERYTHROCYTE [DISTWIDTH] IN BLOOD BY AUTOMATED COUNT: 15.9 % (ref 11.5–14.5)
GLUCOSE SERPL-MCNC: 90 MG/DL (ref 65–100)
HCT VFR BLD AUTO: 21.1 % (ref 35–47)
HGB BLD-MCNC: 7.3 G/DL (ref 11.5–16)
IMM GRANULOCYTES # BLD AUTO: 0 K/UL (ref 0–0.04)
IMM GRANULOCYTES NFR BLD AUTO: 0 % (ref 0–0.5)
LYMPHOCYTES # BLD: 4.4 K/UL (ref 0.8–3.5)
LYMPHOCYTES NFR BLD: 47 % (ref 12–49)
MCH RBC QN AUTO: 32.7 PG (ref 26–34)
MCHC RBC AUTO-ENTMCNC: 34.6 G/DL (ref 30–36.5)
MCV RBC AUTO: 94.6 FL (ref 80–99)
MONOCYTES # BLD: 1.5 K/UL (ref 0–1)
MONOCYTES NFR BLD: 15 % (ref 5–13)
NEUTS SEG # BLD: 3.2 K/UL (ref 1.8–8)
NEUTS SEG NFR BLD: 33 % (ref 32–75)
NRBC # BLD: 0.11 K/UL (ref 0–0.01)
NRBC BLD-RTO: 1.1 PER 100 WBC
PLATELET # BLD AUTO: 177 K/UL (ref 150–400)
PMV BLD AUTO: 11.7 FL (ref 8.9–12.9)
POTASSIUM SERPL-SCNC: 4.2 MMOL/L (ref 3.5–5.1)
RBC # BLD AUTO: 2.23 M/UL (ref 3.8–5.2)
SODIUM SERPL-SCNC: 139 MMOL/L (ref 136–145)
WBC # BLD AUTO: 9.7 K/UL (ref 3.6–11)

## 2019-08-05 PROCEDURE — 94760 N-INVAS EAR/PLS OXIMETRY 1: CPT

## 2019-08-05 PROCEDURE — 85025 COMPLETE CBC W/AUTO DIFF WBC: CPT

## 2019-08-05 PROCEDURE — 65660000000 HC RM CCU STEPDOWN

## 2019-08-05 PROCEDURE — 36415 COLL VENOUS BLD VENIPUNCTURE: CPT

## 2019-08-05 PROCEDURE — 74011250637 HC RX REV CODE- 250/637: Performed by: HOSPITALIST

## 2019-08-05 PROCEDURE — 74011250636 HC RX REV CODE- 250/636: Performed by: INTERNAL MEDICINE

## 2019-08-05 PROCEDURE — 74011250636 HC RX REV CODE- 250/636: Performed by: HOSPITALIST

## 2019-08-05 PROCEDURE — 74011250637 HC RX REV CODE- 250/637: Performed by: INTERNAL MEDICINE

## 2019-08-05 PROCEDURE — 80048 BASIC METABOLIC PNL TOTAL CA: CPT

## 2019-08-05 RX ORDER — HYDROMORPHONE HYDROCHLORIDE 1 MG/ML
0.5 INJECTION, SOLUTION INTRAMUSCULAR; INTRAVENOUS; SUBCUTANEOUS
Status: DISCONTINUED | OUTPATIENT
Start: 2019-08-05 | End: 2019-08-06

## 2019-08-05 RX ORDER — HYDROCODONE BITARTRATE AND ACETAMINOPHEN 10; 325 MG/1; MG/1
1 TABLET ORAL
Status: DISCONTINUED | OUTPATIENT
Start: 2019-08-05 | End: 2019-08-06

## 2019-08-05 RX ADMIN — DEXTROSE MONOHYDRATE, SODIUM CHLORIDE, AND POTASSIUM CHLORIDE 50 ML/HR: 50; 4.5; 1.49 INJECTION, SOLUTION INTRAVENOUS at 10:00

## 2019-08-05 RX ADMIN — PANTOPRAZOLE SODIUM 40 MG: 40 TABLET, DELAYED RELEASE ORAL at 07:16

## 2019-08-05 RX ADMIN — HYDROCODONE BITARTRATE AND ACETAMINOPHEN 1 TABLET: 10; 325 TABLET ORAL at 09:54

## 2019-08-05 RX ADMIN — LOSARTAN POTASSIUM 50 MG: 50 TABLET ORAL at 21:40

## 2019-08-05 RX ADMIN — Medication 10 ML: at 14:00

## 2019-08-05 RX ADMIN — HYDROCODONE BITARTRATE AND ACETAMINOPHEN 1 TABLET: 10; 325 TABLET ORAL at 21:40

## 2019-08-05 RX ADMIN — DIPHENHYDRAMINE HYDROCHLORIDE 25 MG: 50 INJECTION, SOLUTION INTRAMUSCULAR; INTRAVENOUS at 12:15

## 2019-08-05 RX ADMIN — DIPHENHYDRAMINE HYDROCHLORIDE 25 MG: 50 INJECTION, SOLUTION INTRAMUSCULAR; INTRAVENOUS at 18:48

## 2019-08-05 RX ADMIN — DOCUSATE SODIUM 100 MG: 100 CAPSULE, LIQUID FILLED ORAL at 18:47

## 2019-08-05 RX ADMIN — CITALOPRAM HYDROBROMIDE 10 MG: 20 TABLET ORAL at 20:13

## 2019-08-05 RX ADMIN — ONDANSETRON 4 MG: 2 INJECTION INTRAMUSCULAR; INTRAVENOUS at 12:15

## 2019-08-05 RX ADMIN — HYDROMORPHONE HYDROCHLORIDE 0.5 MG: 1 INJECTION, SOLUTION INTRAMUSCULAR; INTRAVENOUS; SUBCUTANEOUS at 18:48

## 2019-08-05 RX ADMIN — HYDROCODONE BITARTRATE AND ACETAMINOPHEN 1 TABLET: 10; 325 TABLET ORAL at 15:23

## 2019-08-05 RX ADMIN — HYDROMORPHONE HYDROCHLORIDE 0.5 MG: 1 INJECTION, SOLUTION INTRAMUSCULAR; INTRAVENOUS; SUBCUTANEOUS at 05:55

## 2019-08-05 RX ADMIN — DOCUSATE SODIUM 100 MG: 100 CAPSULE, LIQUID FILLED ORAL at 09:50

## 2019-08-05 RX ADMIN — HYDROMORPHONE HYDROCHLORIDE 0.5 MG: 1 INJECTION, SOLUTION INTRAMUSCULAR; INTRAVENOUS; SUBCUTANEOUS at 01:57

## 2019-08-05 RX ADMIN — FOLIC ACID 1 MG: 1 TABLET ORAL at 21:40

## 2019-08-05 RX ADMIN — ONDANSETRON 4 MG: 2 INJECTION INTRAMUSCULAR; INTRAVENOUS at 05:55

## 2019-08-05 RX ADMIN — Medication 10 ML: at 21:40

## 2019-08-05 RX ADMIN — HYDROMORPHONE HYDROCHLORIDE 0.5 MG: 1 INJECTION, SOLUTION INTRAMUSCULAR; INTRAVENOUS; SUBCUTANEOUS at 12:15

## 2019-08-05 RX ADMIN — DIPHENHYDRAMINE HYDROCHLORIDE 25 MG: 50 INJECTION, SOLUTION INTRAMUSCULAR; INTRAVENOUS at 05:55

## 2019-08-05 RX ADMIN — ONDANSETRON 4 MG: 2 INJECTION INTRAMUSCULAR; INTRAVENOUS at 18:48

## 2019-08-05 NOTE — PROGRESS NOTES
General Daily Progress Note    Admit Date: 7/27/2019    Subjective:     Patient continues to complain of pain although improving. Current Facility-Administered Medications   Medication Dose Route Frequency    HYDROmorphone (PF) (DILAUDID) injection 0.5 mg  0.5 mg IntraVENous Q6H PRN    HYDROcodone-acetaminophen (NORCO)  mg tablet 1 Tab  1 Tab Oral Q6H PRN    lactulose (CHRONULAC) 10 gram/15 mL solution 45 mL  30 g Oral DAILY PRN    dextrose 5% - 0.45% NaCl with KCl 20 mEq/L infusion  50 mL/hr IntraVENous CONTINUOUS    fentaNYL (DURAGESIC) 75 mcg/hr patch 1 Patch  1 Patch TransDERmal Q72H    sodium chloride (NS) flush 5-40 mL  5-40 mL IntraVENous Q8H    sodium chloride (NS) flush 5-40 mL  5-40 mL IntraVENous PRN    acetaminophen (TYLENOL) tablet 650 mg  650 mg Oral Q6H PRN    naloxone (NARCAN) injection 0.4 mg  0.4 mg IntraVENous PRN    ondansetron (ZOFRAN) injection 4 mg  4 mg IntraVENous Q6H PRN    diphenhydrAMINE (BENADRYL) injection 25 mg  25 mg IntraVENous Q6H PRN    citalopram (CELEXA) tablet 10 mg  10 mg Oral QHS    folic acid (FOLVITE) tablet 1 mg  1 mg Oral QHS    ibuprofen (MOTRIN) tablet 400 mg  400 mg Oral Q6H PRN    pantoprazole (PROTONIX) tablet 40 mg  40 mg Oral ACB    docusate sodium (COLACE) capsule 100 mg  100 mg Oral BID    polyethylene glycol (MIRALAX) packet 17 g  17 g Oral DAILY PRN    losartan (COZAAR) tablet 50 mg  50 mg Oral QHS        Review of Systems  A comprehensive review of systems was negative.     Objective:     Patient Vitals for the past 24 hrs:   BP Temp Pulse Resp SpO2   08/05/19 0726 122/62 98.6 °F (37 °C) 82 16 95 %   08/05/19 0440 116/61 98.4 °F (36.9 °C) 82 17 95 %   08/04/19 2300 116/78 99.1 °F (37.3 °C) 84 18 94 %   08/04/19 1901 126/72 99.1 °F (37.3 °C) 88 18 94 %   08/04/19 1411 123/60 98.5 °F (36.9 °C) 83 17 95 %   08/04/19 1144 131/67 98.8 °F (37.1 °C) 85 18 95 %     08/05 0701 - 08/05 1900  In: 360 [P.O.:360]  Out: -   08/03 1901 - 08/05 0700  In: 2464 [P.O.:1440; I.V.:2990]  Out: -     Physical Exam:   Visit Vitals  /62 (BP 1 Location: Left arm, BP Patient Position: At rest)   Pulse 82   Temp 98.6 °F (37 °C)   Resp 16   Ht 5' 10\" (1.778 m)   Wt 197 lb 1.5 oz (89.4 kg)   SpO2 95%   BMI 28.28 kg/m²     General appearance: alert, cooperative, no distress, appears stated age  Neck: supple, symmetrical, trachea midline, no adenopathy, thyroid: not enlarged, symmetric, no tenderness/mass/nodules, no carotid bruit and no JVD  Lungs: clear to auscultation bilaterally  Heart: regular rate and rhythm, S1, S2 normal, no murmur, click, rub or gallop  Abdomen: soft, non-tender. Bowel sounds normal. No masses,  no organomegaly  Extremities: extremities normal, atraumatic, no cyanosis or edema        Data Review   Recent Results (from the past 24 hour(s))   CBC WITH AUTOMATED DIFF    Collection Time: 08/05/19  6:01 AM   Result Value Ref Range    WBC 9.7 3.6 - 11.0 K/uL    RBC 2.23 (L) 3.80 - 5.20 M/uL    HGB 7.3 (L) 11.5 - 16.0 g/dL    HCT 21.1 (L) 35.0 - 47.0 %    MCV 94.6 80.0 - 99.0 FL    MCH 32.7 26.0 - 34.0 PG    MCHC 34.6 30.0 - 36.5 g/dL    RDW 15.9 (H) 11.5 - 14.5 %    PLATELET 304 459 - 466 K/uL    MPV 11.7 8.9 - 12.9 FL    NRBC 1.1 (H) 0  WBC    ABSOLUTE NRBC 0.11 (H) 0.00 - 0.01 K/uL    NEUTROPHILS 33 32 - 75 %    LYMPHOCYTES 47 12 - 49 %    MONOCYTES 15 (H) 5 - 13 %    EOSINOPHILS 5 0 - 7 %    BASOPHILS 0 0 - 1 %    IMMATURE GRANULOCYTES 0 0.0 - 0.5 %    ABS. NEUTROPHILS 3.2 1.8 - 8.0 K/UL    ABS. LYMPHOCYTES 4.4 (H) 0.8 - 3.5 K/UL    ABS. MONOCYTES 1.5 (H) 0.0 - 1.0 K/UL    ABS. EOSINOPHILS 0.5 (H) 0.0 - 0.4 K/UL    ABS. BASOPHILS 0.0 0.0 - 0.1 K/UL    ABS. IMM.  GRANS. 0.0 0.00 - 0.04 K/UL    DF AUTOMATED     METABOLIC PANEL, BASIC    Collection Time: 08/05/19  6:01 AM   Result Value Ref Range    Sodium 139 136 - 145 mmol/L    Potassium 4.2 3.5 - 5.1 mmol/L    Chloride 106 97 - 108 mmol/L    CO2 30 21 - 32 mmol/L    Anion gap 3 (L) 5 - 15 mmol/L    Glucose 90 65 - 100 mg/dL    BUN 5 (L) 6 - 20 MG/DL    Creatinine 0.76 0.55 - 1.02 MG/DL    BUN/Creatinine ratio 7 (L) 12 - 20      GFR est AA >60 >60 ml/min/1.73m2    GFR est non-AA >60 >60 ml/min/1.73m2    Calcium 8.3 (L) 8.5 - 10.1 MG/DL           Assessment:     Active Problems:    Sickle cell crisis ([facility].) (5/22/2017)      Sickle cell pain crisis ([facility] 75.) (9/8/2018)        Plan:     1. Will begin to wean parenteral analgesics. Painful crises improving. 2.  Will mobilize.

## 2019-08-05 NOTE — PROGRESS NOTES
Problem: Falls - Risk of  Goal: *Absence of Falls  Description  Document Corin Benjamin Fall Risk and appropriate interventions in the flowsheet.   Outcome: Progressing Towards Goal  Note:   Fall Risk Interventions:            Medication Interventions: Teach patient to arise slowly         History of Falls Interventions: Door open when patient unattended         Problem: Patient Education: Go to Patient Education Activity  Goal: Patient/Family Education  Outcome: Progressing Towards Goal

## 2019-08-05 NOTE — PROGRESS NOTES
General Surgery End of Shift Nursing Note    Bedside shift change report given to Farzad Snell RN (oncoming nurse) by Bhumika Nieves (offgoing nurse). Report included the following information SBAR and Recent Results. Shift worked:   1023-1049   Summary of shift:    No significant events overnight. VSS. Patient expresses adequate pain control with current regimen.     Issues for physician to address:   None         Eb Palafox RN

## 2019-08-05 NOTE — PROGRESS NOTES
.General Surgery End of Shift Nursing Note    Bedside shift change report given to Louisa (oncoming nurse) by Bryan Karimi (offgoing nurse). Report included the following information SBAR, OR Summary, Procedure Summary, Intake/Output, MAR and Recent Results. Shift worked:   5878-4047   Summary of shift:   Dc'd telemetry. Pain management for sickle cell crisis. Patient is a self care. Issues for physician to address:  Put in labs for am under Dr. Elio James to monitor H&H     Number times ambulated in hallway past shift: 0  Number of times OOB to chair past shift: 0    Pain Management:  Current medication: dilaudid iv and norco  Patient states pain is manageable on current pain medication:yes  GI:    Current diet:  DIET REGULAR    Tolerating current diet: yes  Passing flatus: yes  Last Bowel Movement:8/3/19  Respiratory:    Incentive Spirometer at bedside: no  Patient instructed on use:yes    Patient Safety:    Falls Score: 1  Bed Alarm On?no  Sitter?  no    Tyson Velasco RN

## 2019-08-06 LAB
ANION GAP SERPL CALC-SCNC: 2 MMOL/L (ref 5–15)
BUN SERPL-MCNC: 7 MG/DL (ref 6–20)
BUN/CREAT SERPL: 9 (ref 12–20)
CALCIUM SERPL-MCNC: 8.3 MG/DL (ref 8.5–10.1)
CHLORIDE SERPL-SCNC: 104 MMOL/L (ref 97–108)
CO2 SERPL-SCNC: 31 MMOL/L (ref 21–32)
CREAT SERPL-MCNC: 0.79 MG/DL (ref 0.55–1.02)
GLUCOSE SERPL-MCNC: 115 MG/DL (ref 65–100)
POTASSIUM SERPL-SCNC: 4 MMOL/L (ref 3.5–5.1)
SODIUM SERPL-SCNC: 137 MMOL/L (ref 136–145)

## 2019-08-06 PROCEDURE — 74011250637 HC RX REV CODE- 250/637: Performed by: HOSPITALIST

## 2019-08-06 PROCEDURE — 74011250637 HC RX REV CODE- 250/637: Performed by: INTERNAL MEDICINE

## 2019-08-06 PROCEDURE — 36415 COLL VENOUS BLD VENIPUNCTURE: CPT

## 2019-08-06 PROCEDURE — 74011250636 HC RX REV CODE- 250/636: Performed by: INTERNAL MEDICINE

## 2019-08-06 PROCEDURE — 74011250636 HC RX REV CODE- 250/636: Performed by: HOSPITALIST

## 2019-08-06 PROCEDURE — 65660000000 HC RM CCU STEPDOWN

## 2019-08-06 PROCEDURE — 94760 N-INVAS EAR/PLS OXIMETRY 1: CPT

## 2019-08-06 PROCEDURE — 80048 BASIC METABOLIC PNL TOTAL CA: CPT

## 2019-08-06 RX ORDER — HYDROCODONE BITARTRATE AND ACETAMINOPHEN 10; 325 MG/1; MG/1
1 TABLET ORAL
Status: DISCONTINUED | OUTPATIENT
Start: 2019-08-06 | End: 2019-08-08 | Stop reason: HOSPADM

## 2019-08-06 RX ORDER — DEXTROSE, SODIUM CHLORIDE, AND POTASSIUM CHLORIDE 5; .45; .15 G/100ML; G/100ML; G/100ML
25 INJECTION INTRAVENOUS CONTINUOUS
Status: DISCONTINUED | OUTPATIENT
Start: 2019-08-06 | End: 2019-08-06

## 2019-08-06 RX ORDER — DEXTROSE, SODIUM CHLORIDE, AND POTASSIUM CHLORIDE 5; .45; .15 G/100ML; G/100ML; G/100ML
25 INJECTION INTRAVENOUS CONTINUOUS
Status: DISCONTINUED | OUTPATIENT
Start: 2019-08-06 | End: 2019-08-08 | Stop reason: HOSPADM

## 2019-08-06 RX ADMIN — PANTOPRAZOLE SODIUM 40 MG: 40 TABLET, DELAYED RELEASE ORAL at 06:32

## 2019-08-06 RX ADMIN — HYDROMORPHONE HYDROCHLORIDE 0.5 MG: 1 INJECTION, SOLUTION INTRAMUSCULAR; INTRAVENOUS; SUBCUTANEOUS at 06:49

## 2019-08-06 RX ADMIN — ONDANSETRON 4 MG: 2 INJECTION INTRAMUSCULAR; INTRAVENOUS at 19:54

## 2019-08-06 RX ADMIN — Medication 10 ML: at 05:56

## 2019-08-06 RX ADMIN — LOSARTAN POTASSIUM 50 MG: 50 TABLET ORAL at 21:18

## 2019-08-06 RX ADMIN — DIPHENHYDRAMINE HYDROCHLORIDE 25 MG: 50 INJECTION, SOLUTION INTRAMUSCULAR; INTRAVENOUS at 13:06

## 2019-08-06 RX ADMIN — Medication 10 ML: at 16:09

## 2019-08-06 RX ADMIN — ONDANSETRON 4 MG: 2 INJECTION INTRAMUSCULAR; INTRAVENOUS at 06:50

## 2019-08-06 RX ADMIN — DEXTROSE MONOHYDRATE, SODIUM CHLORIDE, AND POTASSIUM CHLORIDE 50 ML/HR: 50; 4.5; 1.49 INJECTION, SOLUTION INTRAVENOUS at 05:56

## 2019-08-06 RX ADMIN — ONDANSETRON 4 MG: 2 INJECTION INTRAMUSCULAR; INTRAVENOUS at 00:48

## 2019-08-06 RX ADMIN — HYDROCODONE BITARTRATE AND ACETAMINOPHEN 1 TABLET: 10; 325 TABLET ORAL at 09:16

## 2019-08-06 RX ADMIN — Medication 10 ML: at 16:21

## 2019-08-06 RX ADMIN — DOCUSATE SODIUM 100 MG: 100 CAPSULE, LIQUID FILLED ORAL at 09:16

## 2019-08-06 RX ADMIN — HYDROMORPHONE HYDROCHLORIDE 0.5 MG: 1 INJECTION, SOLUTION INTRAMUSCULAR; INTRAVENOUS; SUBCUTANEOUS at 19:55

## 2019-08-06 RX ADMIN — DOCUSATE SODIUM 100 MG: 100 CAPSULE, LIQUID FILLED ORAL at 17:23

## 2019-08-06 RX ADMIN — HYDROCODONE BITARTRATE AND ACETAMINOPHEN 1 TABLET: 10; 325 TABLET ORAL at 17:23

## 2019-08-06 RX ADMIN — DIPHENHYDRAMINE HYDROCHLORIDE 25 MG: 50 INJECTION, SOLUTION INTRAMUSCULAR; INTRAVENOUS at 00:48

## 2019-08-06 RX ADMIN — CITALOPRAM HYDROBROMIDE 10 MG: 20 TABLET ORAL at 20:02

## 2019-08-06 RX ADMIN — Medication 10 ML: at 13:06

## 2019-08-06 RX ADMIN — DIPHENHYDRAMINE HYDROCHLORIDE 25 MG: 50 INJECTION, SOLUTION INTRAMUSCULAR; INTRAVENOUS at 06:50

## 2019-08-06 RX ADMIN — FOLIC ACID 1 MG: 1 TABLET ORAL at 21:18

## 2019-08-06 RX ADMIN — ONDANSETRON 4 MG: 2 INJECTION INTRAMUSCULAR; INTRAVENOUS at 13:06

## 2019-08-06 RX ADMIN — DIPHENHYDRAMINE HYDROCHLORIDE 25 MG: 50 INJECTION, SOLUTION INTRAMUSCULAR; INTRAVENOUS at 19:54

## 2019-08-06 RX ADMIN — HYDROMORPHONE HYDROCHLORIDE 0.5 MG: 1 INJECTION, SOLUTION INTRAMUSCULAR; INTRAVENOUS; SUBCUTANEOUS at 00:48

## 2019-08-06 RX ADMIN — HYDROMORPHONE HYDROCHLORIDE 0.5 MG: 1 INJECTION, SOLUTION INTRAMUSCULAR; INTRAVENOUS; SUBCUTANEOUS at 13:07

## 2019-08-06 RX ADMIN — Medication 10 ML: at 21:18

## 2019-08-06 NOTE — PROGRESS NOTES
General Surgery End of Shift Nursing Note    Bedside shift change report given to Akash Mon RN (oncoming nurse) by Jackelyn Tavarez RN (offgoing nurse). Report included the following information SBAR, Kardex, Intake/Output, MAR and Recent Results. Shift worked:   7p-7a   Summary of shift:    Patient able to rest overnight, waking for pain medication. No overnight labs ordered for H/H. Issues for physician to address:   None at this time. Number times ambulated in hallway past shift: 0    Number of times OOB to chair past shift: 1    Pain Management:  Current medication: See MAR  Patient states pain is manageable on current pain medication: YES    GI:    Current diet:  DIET REGULAR    Tolerating current diet: YES  Passing flatus: YES    Respiratory:    Incentive Spirometer at bedside: YES  Patient instructed on use: YES    Patient Safety:    Falls Score: 1  Bed Alarm On? No  Sitter?  Sara Sotelo

## 2019-08-06 NOTE — PROGRESS NOTES
.General Surgery End of Shift Nursing Note    Bedside shift change report given to Louisa (oncoming nurse) by Wayne Mckeon (offgoing nurse). Report included the following information SBAR, Procedure Summary, Intake/Output, MAR and Recent Results. Shift worked:   6735-7451   Summary of shift:   Self care pt. She ambulates in room but does not like her chair nor walking in the [place]. She has labs in am. Picc team started a new PIV after the last infiltrated. Pain management continues. Issues for physician to address:  DC planning. Number times ambulated in hallway past shift: 0  Number of times OOB to chair past shift: 0    Pain Management:  Current medication: dilaudid and norco  Patient states pain is manageable on current pain medication: yes    GI:    Current diet:  DIET REGULAR    Tolerating current diet: yes  Passing flatus: yes  Last Bowel Movement: 08/03/19  Respiratory:    Incentive Spirometer at bedside: no  Patient instructed on use: no    Patient Safety:    Falls Score: 1  Bed Alarm On?no  Sitter?  no    Vaughn Mota RN

## 2019-08-06 NOTE — PROGRESS NOTES
General Daily Progress Note    Admit Date: 7/27/2019    Subjective:     Patient continues to complain of pain but improving. Current Facility-Administered Medications   Medication Dose Route Frequency    dextrose 5% - 0.45% NaCl with KCl 20 mEq/L infusion  25 mL/hr IntraVENous CONTINUOUS    HYDROcodone-acetaminophen (NORCO)  mg tablet 1 Tab  1 Tab Oral Q8H PRN    HYDROmorphone (PF) (DILAUDID) injection 0.5 mg  0.5 mg IntraVENous Q6H PRN    lactulose (CHRONULAC) 10 gram/15 mL solution 45 mL  30 g Oral DAILY PRN    fentaNYL (DURAGESIC) 75 mcg/hr patch 1 Patch  1 Patch TransDERmal Q72H    sodium chloride (NS) flush 5-40 mL  5-40 mL IntraVENous Q8H    sodium chloride (NS) flush 5-40 mL  5-40 mL IntraVENous PRN    acetaminophen (TYLENOL) tablet 650 mg  650 mg Oral Q6H PRN    naloxone (NARCAN) injection 0.4 mg  0.4 mg IntraVENous PRN    ondansetron (ZOFRAN) injection 4 mg  4 mg IntraVENous Q6H PRN    diphenhydrAMINE (BENADRYL) injection 25 mg  25 mg IntraVENous Q6H PRN    citalopram (CELEXA) tablet 10 mg  10 mg Oral QHS    folic acid (FOLVITE) tablet 1 mg  1 mg Oral QHS    ibuprofen (MOTRIN) tablet 400 mg  400 mg Oral Q6H PRN    pantoprazole (PROTONIX) tablet 40 mg  40 mg Oral ACB    docusate sodium (COLACE) capsule 100 mg  100 mg Oral BID    polyethylene glycol (MIRALAX) packet 17 g  17 g Oral DAILY PRN    losartan (COZAAR) tablet 50 mg  50 mg Oral QHS        Review of Systems  A comprehensive review of systems was negative.     Objective:     Patient Vitals for the past 24 hrs:   BP Temp Pulse Resp SpO2   08/06/19 0724 109/67 98.9 °F (37.2 °C) 78 16 93 %   08/06/19 0314 121/78 98.7 °F (37.1 °C) 87 17 95 %   08/05/19 2321 120/69 98.8 °F (37.1 °C) 83 16 96 %   08/05/19 1853 133/75 98.6 °F (37 °C) 74 16 96 %   08/05/19 1535 115/64 98.4 °F (36.9 °C) 80 17 95 %   08/05/19 1119 124/78 98.4 °F (36.9 °C) 76 17 96 %     08/06 0701 - 08/06 1900  In: 215.8 [I.V.:215.8]  Out: -   08/04 1901 - 08/06 0700  In: 3812.5 [P.O.:860; I.V.:2952.5]  Out: 850 [Urine:850]    Physical Exam:   Visit Vitals  /67   Pulse 78   Temp 98.9 °F (37.2 °C)   Resp 16   Ht 5' 10\" (1.778 m)   Wt 197 lb 1.5 oz (89.4 kg)   SpO2 93%   BMI 28.28 kg/m²     General appearance: alert, cooperative, no distress, appears stated age  Neck: supple, symmetrical, trachea midline, no adenopathy, thyroid: not enlarged, symmetric, no tenderness/mass/nodules, no carotid bruit and no JVD  Lungs: clear to auscultation bilaterally  Heart: regular rate and rhythm, S1, S2 normal, no murmur, click, rub or gallop  Abdomen: soft, non-tender. Bowel sounds normal. No masses,  no organomegaly  Extremities: extremities normal, atraumatic, no cyanosis or edema        Data Review   Recent Results (from the past 24 hour(s))   METABOLIC PANEL, BASIC    Collection Time: 08/06/19 12:51 AM   Result Value Ref Range    Sodium 137 136 - 145 mmol/L    Potassium 4.0 3.5 - 5.1 mmol/L    Chloride 104 97 - 108 mmol/L    CO2 31 21 - 32 mmol/L    Anion gap 2 (L) 5 - 15 mmol/L    Glucose 115 (H) 65 - 100 mg/dL    BUN 7 6 - 20 MG/DL    Creatinine 0.79 0.55 - 1.02 MG/DL    BUN/Creatinine ratio 9 (L) 12 - 20      GFR est AA >60 >60 ml/min/1.73m2    GFR est non-AA >60 >60 ml/min/1.73m2    Calcium 8.3 (L) 8.5 - 10.1 MG/DL           Assessment:     Active Problems:    Sickle cell crisis ([facility] 75.) (5/22/2017)      Sickle cell pain crisis ([facility] 75.) (9/8/2018)        Plan:     1. Continue treatment of painful crises hopefully discharge tomorrow.

## 2019-08-06 NOTE — PROGRESS NOTES
Problem: Falls - Risk of  Goal: *Absence of Falls  Description  Document Milta Raring Fall Risk and appropriate interventions in the flowsheet.   Outcome: Progressing Towards Goal  Note:   Fall Risk Interventions:            Medication Interventions: Teach patient to arise slowly         History of Falls Interventions: Room close to nurse's station         Problem: Patient Education: Go to Patient Education Activity  Goal: Patient/Family Education  Outcome: Progressing Towards Goal

## 2019-08-07 LAB
BASOPHILS # BLD: 0 K/UL (ref 0–0.1)
BASOPHILS NFR BLD: 0 % (ref 0–1)
BLASTS NFR BLD MANUAL: 0 %
DIFFERENTIAL METHOD BLD: ABNORMAL
EOSINOPHIL # BLD: 0.7 K/UL (ref 0–0.4)
EOSINOPHIL NFR BLD: 7 % (ref 0–7)
ERYTHROCYTE [DISTWIDTH] IN BLOOD BY AUTOMATED COUNT: 16.7 % (ref 11.5–14.5)
HCT VFR BLD AUTO: 22.7 % (ref 35–47)
HGB BLD-MCNC: 7.8 G/DL (ref 11.5–16)
IMM GRANULOCYTES # BLD AUTO: 0 K/UL
IMM GRANULOCYTES NFR BLD AUTO: 0 %
LYMPHOCYTES # BLD: 4 K/UL (ref 0.8–3.5)
LYMPHOCYTES NFR BLD: 39 % (ref 12–49)
MCH RBC QN AUTO: 32.9 PG (ref 26–34)
MCHC RBC AUTO-ENTMCNC: 34.4 G/DL (ref 30–36.5)
MCV RBC AUTO: 95.8 FL (ref 80–99)
METAMYELOCYTES NFR BLD MANUAL: 0 %
MONOCYTES # BLD: 1.4 K/UL (ref 0–1)
MONOCYTES NFR BLD: 14 % (ref 5–13)
MYELOCYTES NFR BLD MANUAL: 0 %
NEUTS BAND NFR BLD MANUAL: 0 % (ref 0–6)
NEUTS SEG # BLD: 4.1 K/UL (ref 1.8–8)
NEUTS SEG NFR BLD: 40 % (ref 32–75)
NRBC # BLD: 0.18 K/UL (ref 0–0.01)
NRBC BLD-RTO: 1.8 PER 100 WBC
OTHER CELLS NFR BLD MANUAL: 0 %
PLATELET # BLD AUTO: 195 K/UL (ref 150–400)
PMV BLD AUTO: 11.3 FL (ref 8.9–12.9)
PROMYELOCYTES NFR BLD MANUAL: 0 %
RBC # BLD AUTO: 2.37 M/UL (ref 3.8–5.2)
RBC MORPH BLD: ABNORMAL
WBC # BLD AUTO: 10.2 K/UL (ref 3.6–11)

## 2019-08-07 PROCEDURE — 65660000000 HC RM CCU STEPDOWN

## 2019-08-07 PROCEDURE — 36415 COLL VENOUS BLD VENIPUNCTURE: CPT

## 2019-08-07 PROCEDURE — 74011250636 HC RX REV CODE- 250/636: Performed by: INTERNAL MEDICINE

## 2019-08-07 PROCEDURE — 74011250637 HC RX REV CODE- 250/637: Performed by: HOSPITALIST

## 2019-08-07 PROCEDURE — 94760 N-INVAS EAR/PLS OXIMETRY 1: CPT

## 2019-08-07 PROCEDURE — 74011250636 HC RX REV CODE- 250/636: Performed by: HOSPITALIST

## 2019-08-07 PROCEDURE — 85027 COMPLETE CBC AUTOMATED: CPT

## 2019-08-07 PROCEDURE — 74011250637 HC RX REV CODE- 250/637: Performed by: INTERNAL MEDICINE

## 2019-08-07 RX ADMIN — Medication 10 ML: at 21:01

## 2019-08-07 RX ADMIN — DIPHENHYDRAMINE HYDROCHLORIDE 25 MG: 50 INJECTION, SOLUTION INTRAMUSCULAR; INTRAVENOUS at 14:53

## 2019-08-07 RX ADMIN — ONDANSETRON 4 MG: 2 INJECTION INTRAMUSCULAR; INTRAVENOUS at 14:53

## 2019-08-07 RX ADMIN — HYDROCODONE BITARTRATE AND ACETAMINOPHEN 1 TABLET: 10; 325 TABLET ORAL at 18:14

## 2019-08-07 RX ADMIN — PANTOPRAZOLE SODIUM 40 MG: 40 TABLET, DELAYED RELEASE ORAL at 06:36

## 2019-08-07 RX ADMIN — HYDROCODONE BITARTRATE AND ACETAMINOPHEN 1 TABLET: 10; 325 TABLET ORAL at 01:59

## 2019-08-07 RX ADMIN — ONDANSETRON 4 MG: 2 INJECTION INTRAMUSCULAR; INTRAVENOUS at 01:59

## 2019-08-07 RX ADMIN — FOLIC ACID 1 MG: 1 TABLET ORAL at 21:00

## 2019-08-07 RX ADMIN — Medication 10 ML: at 14:00

## 2019-08-07 RX ADMIN — ONDANSETRON 4 MG: 2 INJECTION INTRAMUSCULAR; INTRAVENOUS at 08:24

## 2019-08-07 RX ADMIN — DEXTROSE MONOHYDRATE, SODIUM CHLORIDE, AND POTASSIUM CHLORIDE 25 ML/HR: 50; 4.5; 1.49 INJECTION, SOLUTION INTRAVENOUS at 18:15

## 2019-08-07 RX ADMIN — DIPHENHYDRAMINE HYDROCHLORIDE 25 MG: 50 INJECTION, SOLUTION INTRAMUSCULAR; INTRAVENOUS at 08:24

## 2019-08-07 RX ADMIN — HYDROCODONE BITARTRATE AND ACETAMINOPHEN 1 TABLET: 10; 325 TABLET ORAL at 10:07

## 2019-08-07 RX ADMIN — DOCUSATE SODIUM 100 MG: 100 CAPSULE, LIQUID FILLED ORAL at 08:24

## 2019-08-07 RX ADMIN — DOCUSATE SODIUM 100 MG: 100 CAPSULE, LIQUID FILLED ORAL at 18:14

## 2019-08-07 RX ADMIN — DIPHENHYDRAMINE HYDROCHLORIDE 25 MG: 50 INJECTION, SOLUTION INTRAMUSCULAR; INTRAVENOUS at 21:00

## 2019-08-07 RX ADMIN — ONDANSETRON 4 MG: 2 INJECTION INTRAMUSCULAR; INTRAVENOUS at 20:59

## 2019-08-07 RX ADMIN — DIPHENHYDRAMINE HYDROCHLORIDE 25 MG: 50 INJECTION, SOLUTION INTRAMUSCULAR; INTRAVENOUS at 01:59

## 2019-08-07 RX ADMIN — LOSARTAN POTASSIUM 50 MG: 50 TABLET ORAL at 21:00

## 2019-08-07 RX ADMIN — LACTULOSE 45 ML: 20 SOLUTION ORAL at 14:56

## 2019-08-07 RX ADMIN — Medication 10 ML: at 06:36

## 2019-08-07 RX ADMIN — CITALOPRAM HYDROBROMIDE 10 MG: 20 TABLET ORAL at 21:00

## 2019-08-07 NOTE — PROGRESS NOTES
General Surgery End of Shift Nursing Note    Bedside shift change report given to Horacio lopez RN (oncoming nurse) by Tavares Galeano RN (offgoing nurse). Report included the following information SBAR, Kardex, Intake/Output, MAR and Recent Results. Shift worked:   7p-7a   Summary of shift:    Patient rested well overnight after pain medications were administered. Patient able to ambulate in her room and use the restroom with no problems. Dilaudid was discontinued overnight. Issues for physician to address:   None at this time. Number times ambulated in hallway past shift: 1    Number of times OOB to chair past shift: 0    Pain Management:  Current medication: See MAR  Patient states pain is manageable on current pain medication: YES    GI:    Current diet:  DIET REGULAR    Tolerating current diet: YES  Passing flatus: YES    Respiratory:    Incentive Spirometer at bedside: YES  Patient instructed on use: YES    Patient Safety:    Falls Score: 1  Bed Alarm On? No  Sitter?  No    Hans [place]

## 2019-08-07 NOTE — PROGRESS NOTES
Problem: Falls - Risk of  Goal: *Absence of Falls  Description  Document Emma Dunn Fall Risk and appropriate interventions in the flowsheet.   8/7/2019 1536 by Chelly Pinto RN  Outcome: Progressing Towards Goal  Note:   Fall Risk Interventions:            Medication Interventions: Teach patient to arise slowly         History of Falls Interventions: Room close to nurse's station      8/7/2019 1535 by Chelly Pinto RN  Outcome: Progressing Towards Goal  Note:   Fall Risk Interventions:            Medication Interventions: Teach patient to arise slowly         History of Falls Interventions: Room close to nurse's station         Problem: Patient Education: Go to Patient Education Activity  Goal: Patient/Family Education  8/7/2019 1536 by Chelly Pinto RN  Outcome: Progressing Towards Goal  8/7/2019 1535 by Chelly Pinto RN  Outcome: Progressing Towards Goal

## 2019-08-07 NOTE — PROGRESS NOTES
.General Surgery End of Shift Nursing Note    Bedside shift change report given to Parish Patel (oncoming nurse) by Kavin Nava (offgoing nurse). Report included the following information ED Summary, Procedure Summary, Intake/Output, MAR and Recent Results. Shift worked:   0107-6952   Summary of shift:   Sickle cell crisis pt continues pain management. Pt continues to request Hong Shiver and benadryl given regularly. Pleasant and awaiting DC   Issues for physician to address:   DC     Number times ambulated in hallway past shift: 0   Number of times OOB to chair past shift: 0    Pain Management:  Current medication: norco  Patient states pain is manageable on current pain medication: yes    GI:    Current diet:  DIET REGULAR    Tolerating current diet:yesPassing flatus:no  Last Bowel Movement: 8/3/19  Respiratory:    Incentive Spirometer at bedside: yes  Patient instructed on use:yes    Patient Safety:    Falls Score: 1  Bed Alarm On?no  Sitter?  no    Elly Keene RN

## 2019-08-07 NOTE — PROGRESS NOTES
Problem: Falls - Risk of  Goal: *Absence of Falls  Description  Document Kirk Angélica Fall Risk and appropriate interventions in the flowsheet.   Outcome: Progressing Towards Goal  Note:   Fall Risk Interventions:            Medication Interventions: Teach patient to arise slowly         History of Falls Interventions: Room close to nurse's station         Problem: Patient Education: Go to Patient Education Activity  Goal: Patient/Family Education  Outcome: Progressing Towards Goal

## 2019-08-08 VITALS
RESPIRATION RATE: 16 BRPM | SYSTOLIC BLOOD PRESSURE: 123 MMHG | WEIGHT: 197.09 LBS | OXYGEN SATURATION: 99 % | DIASTOLIC BLOOD PRESSURE: 71 MMHG | HEIGHT: 70 IN | TEMPERATURE: 98.8 F | BODY MASS INDEX: 28.22 KG/M2 | HEART RATE: 82 BPM

## 2019-08-08 PROCEDURE — 74011250636 HC RX REV CODE- 250/636: Performed by: HOSPITALIST

## 2019-08-08 PROCEDURE — 94760 N-INVAS EAR/PLS OXIMETRY 1: CPT

## 2019-08-08 PROCEDURE — 74011250637 HC RX REV CODE- 250/637: Performed by: HOSPITALIST

## 2019-08-08 PROCEDURE — 74011250637 HC RX REV CODE- 250/637: Performed by: INTERNAL MEDICINE

## 2019-08-08 RX ORDER — HYDROCODONE BITARTRATE AND ACETAMINOPHEN 10; 325 MG/1; MG/1
1 TABLET ORAL
Qty: 120 TAB | Refills: 0 | Status: SHIPPED | OUTPATIENT
Start: 2019-08-08 | End: 2019-09-04 | Stop reason: SDUPTHER

## 2019-08-08 RX ADMIN — DOCUSATE SODIUM 100 MG: 100 CAPSULE, LIQUID FILLED ORAL at 09:45

## 2019-08-08 RX ADMIN — HYDROCODONE BITARTRATE AND ACETAMINOPHEN 1 TABLET: 10; 325 TABLET ORAL at 02:29

## 2019-08-08 RX ADMIN — DIPHENHYDRAMINE HYDROCHLORIDE 25 MG: 50 INJECTION, SOLUTION INTRAMUSCULAR; INTRAVENOUS at 03:03

## 2019-08-08 RX ADMIN — PANTOPRAZOLE SODIUM 40 MG: 40 TABLET, DELAYED RELEASE ORAL at 09:45

## 2019-08-08 RX ADMIN — ONDANSETRON 4 MG: 2 INJECTION INTRAMUSCULAR; INTRAVENOUS at 03:02

## 2019-08-08 RX ADMIN — Medication 10 ML: at 05:42

## 2019-08-08 RX ADMIN — DIPHENHYDRAMINE HYDROCHLORIDE 25 MG: 50 INJECTION, SOLUTION INTRAMUSCULAR; INTRAVENOUS at 09:44

## 2019-08-08 RX ADMIN — ONDANSETRON 4 MG: 2 INJECTION INTRAMUSCULAR; INTRAVENOUS at 09:45

## 2019-08-08 NOTE — PROGRESS NOTES
I have reviewed discharge instructions with the patient. The patient verbalized understanding.     Sent with RX

## 2019-08-08 NOTE — DISCHARGE INSTRUCTIONS
General Discharge Instructions    Patient ID:  Rene Franklin  [id number]  66 y.o.  1960    Patient Instructions          The following personal items were collected during your admission and were returned to you. Take Home Medications             What to do at Home    Recommended diet: Regular Diet    Recommended activity: Activity as tolerated    Follow-up with Sharan Dias MD  in 5 days. Information obtained by :  I understand that if any problems occur once I am at home I am to contact my physician. I understand and acknowledge receipt of the instructions indicated above.                                                                                                                                            Physician's or R.N.'s Signature                                                                  Date/Time                                                                                                                                              Patient or Representative Signature                                                          Date/Time

## 2019-08-08 NOTE — PROGRESS NOTES
General Daily Progress Note    Admit Date: 7/27/2019    Subjective:     Patient has no complaint . Current Facility-Administered Medications   Medication Dose Route Frequency    dextrose 5% - 0.45% NaCl with KCl 20 mEq/L infusion  25 mL/hr IntraVENous CONTINUOUS    HYDROcodone-acetaminophen (NORCO)  mg tablet 1 Tab  1 Tab Oral Q8H PRN    lactulose (CHRONULAC) 10 gram/15 mL solution 45 mL  30 g Oral DAILY PRN    fentaNYL (DURAGESIC) 75 mcg/hr patch 1 Patch  1 Patch TransDERmal Q72H    sodium chloride (NS) flush 5-40 mL  5-40 mL IntraVENous Q8H    sodium chloride (NS) flush 5-40 mL  5-40 mL IntraVENous PRN    acetaminophen (TYLENOL) tablet 650 mg  650 mg Oral Q6H PRN    naloxone (NARCAN) injection 0.4 mg  0.4 mg IntraVENous PRN    ondansetron (ZOFRAN) injection 4 mg  4 mg IntraVENous Q6H PRN    diphenhydrAMINE (BENADRYL) injection 25 mg  25 mg IntraVENous Q6H PRN    citalopram (CELEXA) tablet 10 mg  10 mg Oral QHS    folic acid (FOLVITE) tablet 1 mg  1 mg Oral QHS    ibuprofen (MOTRIN) tablet 400 mg  400 mg Oral Q6H PRN    pantoprazole (PROTONIX) tablet 40 mg  40 mg Oral ACB    docusate sodium (COLACE) capsule 100 mg  100 mg Oral BID    polyethylene glycol (MIRALAX) packet 17 g  17 g Oral DAILY PRN    losartan (COZAAR) tablet 50 mg  50 mg Oral QHS        Review of Systems  A comprehensive review of systems was negative. Objective:     Patient Vitals for the past 24 hrs:   BP Temp Pulse Resp SpO2   08/07/19 1944 116/65 98.9 °F (37.2 °C) 84 17 97 %   08/07/19 1443 133/83 99.3 °F (37.4 °C) 87 17 100 %   08/07/19 1112 134/63 99.1 °F (37.3 °C) 86 16 100 %   08/07/19 0719 126/72 98.9 °F (37.2 °C) 75 16 97 %   08/07/19 0312 118/71 99.1 °F (37.3 °C) 74 16 97 %   08/06/19 2342 126/64 98.4 °F (36.9 °C) 78 16 92 %   08/06/19 2341  98.4 °F (36.9 °C)        No intake/output data recorded.   08/06 0701 - 08/07 1900  In: 2229.6 [P.O.:1460; I.V.:769.6]  Out: 1300 [Urine:1300]    Physical Exam:   Visit Vitals  /65   Pulse 84   Temp 98.9 °F (37.2 °C)   Resp 17   Ht 5' 10\" (1.778 m)   Wt 197 lb 1.5 oz (89.4 kg)   SpO2 97%   BMI 28.28 kg/m²     General appearance: alert, cooperative, no distress, appears stated age  Neck: supple, symmetrical, trachea midline, no adenopathy, thyroid: not enlarged, symmetric, no tenderness/mass/nodules, no carotid bruit and no JVD  Lungs: clear to auscultation bilaterally  Heart: regular rate and rhythm, S1, S2 normal, no murmur, click, rub or gallop  Abdomen: soft, non-tender. Bowel sounds normal. No masses,  no organomegaly  Extremities: extremities normal, atraumatic, no cyanosis or edema        Data Review   Recent Results (from the past 24 hour(s))   CBC WITH MANUAL DIFF    Collection Time: 08/07/19  2:02 AM   Result Value Ref Range    WBC 10.2 3.6 - 11.0 K/uL    RBC 2.37 (L) 3.80 - 5.20 M/uL    HGB 7.8 (L) 11.5 - 16.0 g/dL    HCT 22.7 (L) 35.0 - 47.0 %    MCV 95.8 80.0 - 99.0 FL    MCH 32.9 26.0 - 34.0 PG    MCHC 34.4 30.0 - 36.5 g/dL    RDW 16.7 (H) 11.5 - 14.5 %    PLATELET 219 071 - 472 K/uL    MPV 11.3 8.9 - 12.9 FL    NRBC 1.8 (H) 0  WBC    ABSOLUTE NRBC 0.18 (H) 0.00 - 0.01 K/uL    NEUTROPHILS 40 32 - 75 %    BAND NEUTROPHILS 0 0 - 6 %    LYMPHOCYTES 39 12 - 49 %    MONOCYTES 14 (H) 5 - 13 %    EOSINOPHILS 7 0 - 7 %    BASOPHILS 0 0 - 1 %    METAMYELOCYTES 0 0 %    MYELOCYTES 0 0 %    PROMYELOCYTES 0 0 %    BLASTS 0 0 %    OTHER CELL 0 0      IMMATURE GRANULOCYTES 0 %    ABS. NEUTROPHILS 4.1 1.8 - 8.0 K/UL    ABS. LYMPHOCYTES 4.0 (H) 0.8 - 3.5 K/UL    ABS. MONOCYTES 1.4 (H) 0.0 - 1.0 K/UL    ABS. EOSINOPHILS 0.7 (H) 0.0 - 0.4 K/UL    ABS. BASOPHILS 0.0 0.0 - 0.1 K/UL    ABS. IMM.  GRANS. 0.0 K/UL    DF MANUAL      RBC COMMENTS SICKLE CELLS  PRESENT        RBC COMMENTS ANISOCYTOSIS  1+        RBC COMMENTS TARGET CELLS  1+        RBC COMMENTS POLYCHROMASIA  1+               Assessment:     Active Problems:    Sick[facility] crisis ([email or web address] (5/22/2017)      [facility] (9/8/2018)        Plan:     1. Significant improvement in pain except for continued pelvic pain. Plan to the patient there is a little can be done about this should improve. Discharge tomorrow if all goes well.

## 2019-08-08 NOTE — PROGRESS NOTES
PCP KRISTINA appt scheduled with  on 8/12/2019 at 1:15pm. Appt added to AVS. LOGAN Winston CM Specialist

## 2019-08-08 NOTE — PROGRESS NOTES
Plan:  -Home  -PCP f/u appt   -Friend to transport       8:34AM  D/c order acknowledged by CM. Pt to d/c home. No CM needs identified. 2nd IM notice provided, explained, signed, and placed on chart. Friend to transport between 11 and 12. Pt ready for d/c from CM perspective. CM available for any additional needs. Care Management Interventions  PCP Verified by CM: Yes(Dr. Levy Agarwal )  Mode of Transport at Discharge:  Other (see comment)(Family/Friends )  Transition of Care Consult (CM Consult): Discharge Planning  Discharge Durable Medical Equipment: No  Physical Therapy Consult: No  Occupational Therapy Consult: No  Speech Therapy Consult: No  Current Support Network: Lives Alone, Own Home  Confirm Follow Up Transport: Self  Plan discussed with Pt/Family/Caregiver: Yes  Discharge Location  Discharge Placement: Home       TEMITOPE Mckenna  Care Manager

## 2019-08-08 NOTE — PROGRESS NOTES
General Surgery End of Shift Nursing Note    Bedside shift change report given to mian (oncoming nurse) by Heidy Peñaloza  (offgoing nurse). Report included the following information SBAR, Kardex, Intake/Output, MAR, Recent Results, Med Rec Status, Alarm Parameters  and Quality Measures. Shift worked:   7pm-7am   Summary of shift:   Patient alert and oriented x4, continued sickle cell crisis management. Patient requested benadryl and zofran together during shift. Pain management w/ norco, hydration iv fluids. Issues for physician to address:  discharge     Number times ambulated in hallway past shift:0  Number of times OOB to chair past shift: 1    Pain Management:  Current medication: Norco  Patient states pain is manageable on current pain medication: YES    GI:    Current diet:  DIET REGULAR    Tolerating current diet: YES  Passing flatus: YES  Last Bowel Movement: 08/07/19  *    Respiratory:    Incentive Spirometer at bedside: YES  Patient instructed on use: YES    Patient Safety:    Falls Score: 1  Bed Alarm On? NO  Sitter?  NO    Sherly Pfeiffer

## 2019-08-09 ENCOUNTER — PATIENT OUTREACH (OUTPATIENT)
Dept: INTERNAL MEDICINE CLINIC | Age: 59
End: 2019-08-09

## 2019-08-09 NOTE — LETTER
8/9/2019 3:54 PM 
 
[facility] [id number] Dear Ms. Brynn Barbour am the R.N. Nurse Navigator working with Dr. Jesse Merritt. We are glad to hear you are home and hope you are feeling much better. Part of my job is to follow up with patients who have been to the hospital to see how they are feeling, answer any questions they may have about their visit and also make sure they have a follow-up appointment to see their primary care doctor. I have been unable to reach you by telephone and wanted to make sure that you scheduled a follow-up appointment to come in and talk to Dr. Jesse Merritt about your recent visit to the hospital.  Please call our office to schedule your appointment at the number above. In the meantime, if you have any questions or concerns, please feel free to call me on my direct line at [phone]. Thank you for allowing us to provide you with excellent care. Our goal is to address all of your concerns and needs. We strive to provide excellent quality care at our Medical Practice here [facility]. We strive to be rated as excellent, as anything less than excellent does not meet our expectations. Thank you again for choosing us for your continued health care needs. Sincerely, Leslie Perez RN Ambulatory Care Manager

## 2019-08-12 ENCOUNTER — OFFICE VISIT (OUTPATIENT)
Dept: INTERNAL MEDICINE CLINIC | Age: 59
End: 2019-08-12

## 2019-08-12 ENCOUNTER — PATIENT OUTREACH (OUTPATIENT)
Dept: INTERNAL MEDICINE CLINIC | Age: 59
End: 2019-08-12

## 2019-08-12 VITALS
TEMPERATURE: 99.7 F | HEIGHT: 70 IN | HEART RATE: 93 BPM | DIASTOLIC BLOOD PRESSURE: 81 MMHG | OXYGEN SATURATION: 92 % | RESPIRATION RATE: 16 BRPM | BODY MASS INDEX: 29.98 KG/M2 | WEIGHT: 209.4 LBS | SYSTOLIC BLOOD PRESSURE: 130 MMHG

## 2019-08-12 DIAGNOSIS — E66.09 CLASS 1 OBESITY DUE TO EXCESS CALORIES WITH SERIOUS COMORBIDITY AND BODY MASS INDEX (BMI) OF 30.0 TO 30.9 IN ADULT: ICD-10-CM

## 2019-08-12 DIAGNOSIS — I10 ESSENTIAL HYPERTENSION: ICD-10-CM

## 2019-08-12 DIAGNOSIS — F32.A DEPRESSION, UNSPECIFIED DEPRESSION TYPE: ICD-10-CM

## 2019-08-12 DIAGNOSIS — D57.00 SICKLE CELL CRISIS (HCC): Primary | ICD-10-CM

## 2019-08-12 NOTE — PROGRESS NOTES
[facility] 5/26-6/6/2019: Sickle Cell Crisis f/u  NN spoke with patient; states she remains on vacation and will have to call at a later time. Denied serious SS pain. Will continue to f/u for case post 30 day closure.

## 2019-08-12 NOTE — PROGRESS NOTES
Chief Complaint   Patient presents with   [facility] Follow Up     Patient admitted to [facility] on 7/27/19 for Sickle Cell Crisis. 1. Have you been to the ER, urgent care clinic since your last visit? Hospitalized since your last visit? Yes When: 7/27/19 Where: [facility] Reason for visit: sickle cell crisis    2. Have you seen or consulted any other health care providers outside of the [address] [place] [address] since your last visit? Include any pap smears or colon screening.  No

## 2019-08-13 PROBLEM — E66.09 CLASS 1 OBESITY DUE TO EXCESS CALORIES WITH SERIOUS COMORBIDITY AND BODY MASS INDEX (BMI) OF 30.0 TO 30.9 IN ADULT: Status: ACTIVE | Noted: 2019-08-13

## 2019-08-13 NOTE — DISCHARGE SUMMARY
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 She was treated aggressively in the emergency room with no meaningful improvement and was subsequently admitted for painful sickle crisis. Historically, the patient's crisis had been rather frequent requiring hospitalizations almost every 2 months. Past medical history, social history, review of systems, family history, physical examination is as in admitting H and P.    LABORATORY VALUES:  Initial hemoglobin was 8.4, white count 8.9, MCV was 96.4, platelet count [phone],568 with the following differential:  25 segs, 58 lymphocytes, 9 monocytes, 6 eosinophils, and 1 basophil with 1 myelocyte. At the time of discharge, hemoglobin was 7.8 with a white count 10.2. Abnormalities on the comprehensive profile on admission revealed a bilirubin of 1.5. Urinalysis is normal.    HOSPITAL COURSE:  The patient was admitted and placed on O2 and parenteral fluids as well as analgesics. Her usual regimen is Dilaudid 0.5 mg along with Benadryl 50 mg and Phenergan 25 mg. She continued to have pain and remained on this regimen until the latter portion of the hospital stay at which time, this was gradually weaned. Her crisis was uneventful otherwise. FINAL DIAGNOSES:  1.  Painful sickle crisis. 2.  Primary hypertension. 3.  History of depression. 4.  Status post cholecystectomy. DISCHARGE MEDICATIONS:  Include the following:  Celexa 10 mg daily, fentanyl patch 75 mcg q. 3 days, folic acid 1 mg daily, hydrocodone 10/325 q. 6 hours p.r.n., omeprazole 20 mg daily, Phenergan 25 mg q. 8 hours p.r.n., telmisartan 40 mg daily. DISPOSITION:  1. The patient will be discharged home ambulatory on a regular diet. 2.  The patient remains a full code. 3.  The patient will return to the office in the next 5 days.       Gustabo Canada MD      [id number]  D:  08/12/2019 8:26  T:  08/12/2019 12:05  JOB #:  [id number]

## 2019-08-13 NOTE — PROGRESS NOTES
[address] [place] [address] and Primary Care  [place] [address]  [address] [place] [address]  Phone:  [phone]  Fax: [phone]       Chief Complaint   Patient presents with   [facility] Follow Up     Patient admitted to [facility] on 7/27/19 for Sickle Cell Crisis. .      SUBJECTIVE:    Eric Burton is a 66 y.o. female Comes in for return visit for follow up after being hospitalized for a painful sickle crisis. She was hospitalized for almost two weeks. The hospital course was totally uneventful. Since being home she has done reasonably well. She has been taking her hydrocodone as needed. She has a past history of primary hypertension, depression and now obesity. Current Outpatient Medications   Medication Sig Dispense Refill    HYDROcodone-acetaminophen (NORCO)  mg tablet Take 1 Tab by mouth every six (6) hours as needed for Pain for up to 30 days. Max Daily Amount: 4 Tabs. Indications: sickl;e cell crisis 120 Tab 0    fentaNYL (DURAGESIC) 100 mcg/hr PATCH 1 Patch by TransDERmal route every seventy-two (72) hours.  hydrocortisone (CORTISONE) 1 % topical cream Apply  to affected area two (2) times daily as needed for Skin Irritation. use thin layer 30 g 11    telmisartan (MICARDIS) 40 mg tablet Take 1 Tab by mouth nightly. 30 Tab 11    folic acid (FOLVITE) 1 mg tablet Take 1 mg by mouth nightly.  omeprazole (PRILOSEC OTC) 20 mg tablet Take 20 mg by mouth nightly.  promethazine (PHENERGAN) 25 mg tablet Take 1 Tab by mouth every eight (8) hours as needed for Nausea.  90 Tab 11    citalopram (CELEXA) 10 mg tablet TAKE 1 TABLET BY MOUTH EVERY NIGHT AT BEDTIME 90 Tab 3     Past Medical History:   Diagnosis Date    Anemia     SC hemoglobinopathy    Chronic pain     Depression     Hypertension     Liver disease     hepatitis C; pt reports \"cured\"    Sickle cell disease ([facility] [address]      Past Surgical History:   Procedure Laterality Date    BREAST SURGERY PROCEDURE UNLISTED      2 cysts removed from R breast    CHEST SURGERY PROCEDURE UNLISTED      status post thor for removal of a benign     HX BREAST BIOPSY Right 05/2007    negative    HX CHOLECYSTECTOMY      HX ORTHOPAEDIC      bony curettage of an ostial myelitis focus     Allergies   Allergen Reactions    Dilaudid [Hydromorphone (Bulk)] Itching     Can tolerate with benadryl and ondansetron    Percocet [Oxycodone-Acetaminophen] Itching         REVIEW OF SYSTEMS:  General: negative for - chills or fever  ENT: negative for - headaches, nasal congestion or tinnitus  Respiratory: negative for - cough, hemoptysis, shortness of breath or wheezing  Cardiovascular : negative for - chest pain, edema, palpitations or shortness of breath  Gastrointestinal: negative for - abdominal pain, blood in stools, heartburn or nausea/vomiting  Genito-Urinary: no dysuria, trouble voiding, or hematuria  Musculoskeletal: negative for - gait disturbance, joint pain, joint stiffness or joint swelling  Neurological: no TIA or stroke symptoms  Hematologic: no bruises, no bleeding, no swollen glands  Integument: no lumps, mole changes, nail changes or rash  Endocrine: no malaise/lethargy or unexpected weight changes      Social History     Socioeconomic History    Marital status:      Spouse name: Not on file    Number of children: 2    Years of education: Not on file    Highest education level: Not on file   Occupational History    Occupation: disabled---Sickle cell disease   Tobacco Use    Smoking status: Never Smoker    Smokeless tobacco: Never Used   Substance and Sexual Activity    Alcohol use: No    Drug use: No    Sexual activity: Yes     Partners: Male     Family History   Problem Relation Age of Onset    Hypertension Mother     Hypertension Father        OBJECTIVE:    Visit Vitals  /81   Pulse 93   Temp 99.7 °F (37.6 °C) (Oral)   Resp 16   Ht 5' 10\" (1.778 m)   Wt 209 lb 6.4 oz (95 kg)   SpO2 92%   BMI 30.05 kg/m²     CONSTITUTIONAL: well , well nourished, appears age appropriate  EYES: perrla, eom intact  ENMT:moist mucous membranes, pharynx clear  NECK: supple. Thyroid normal  RESPIRATORY: Chest: clear to ascultation and percussion   CARDIOVASCULAR: Heart: regular rate and rhythm  GASTROINTESTINAL: Abdomen: soft, bowel sounds active  HEMATOLOGIC: no pathological lymph nodes palpated  MUSCULOSKELETAL: Extremities: no edema, pulse 1+   INTEGUMENT: No unusual rashes or suspicious skin lesions noted. Nails appear normal.  NEUROLOGIC: non-focal exam   MENTAL STATUS: alert and oriented, appropriate affect      ASSESSMENT:  1. Sickle cell crisis ([address]    2. Essential hypertension    3. Depression, unspecified depression type    4. Class 1 obesity due to excess calories with serious comorbidity and body mass index (BMI) of 30.0 to 30.9 in adult        PLAN:    1. She continues with analgesics. I previously discussed hydroxychloroquine as an adjunctive therapy for sickle cell disease, but she declined and has not really been interested. There are more side effects with _______________, so I do not think she would be receptive to this either. 2. BP is excellent today, no adjustments are made. 3. She appears to be quite upbeat. I was concerned about flare-up of her depression, particularly in view of the duration of her hospital stay, but she definitely states she is not any more depressed than her usual.  4. I strongly encouraged patient to lose weight. This can be accomplished by eating meals, eliminating snacks and avoiding the consumption of processed carbs. Follow-up and Dispositions    · Return in about 2 months (around 10/12/2019).            Gab Palomo MD

## 2019-08-23 ENCOUNTER — PATIENT OUTREACH (OUTPATIENT)
Dept: INTERNAL MEDICINE CLINIC | Age: 59
End: 2019-08-23

## 2019-08-23 NOTE — PROGRESS NOTES
[facility] 7/27-8/8/2019:  Sickle Cell Crisis f/u    Patient on Patient Assignment F/u contact list:    Needs f/u on pain management. Needs f/u on [facility] f/u decision. Needs f/u on advised weight reduction & plan. Needs f/u on dietary changes agreed upon:     Increased water intake   Increased vegetable intake-no sugary  foods/beverages. Needs f/u teachings on sickle cell management:    Minimal pain meds  for the treatment when  possible. When patient uses analgesics  and physical therapy techniques to follow  at home as exercise.

## 2019-08-23 NOTE — PROGRESS NOTES
[facility] -2019: Sickle Cell Crisis f/u  Hospital Discharge Follow-Up    Date/Time:  2019 12: 50PM    Patient was admitted to [facility] on 2019 and discharged on 2019 for Sickle Cell Crisis. The physician discharge summary was available at the time of outreach. Patient was contacted within 2 business days of discharge. Top Challenges reviewed with the provider   Remains concerned still can't have patch & pain med simultaneously-states they do in the hospital.      Utilization of Services          Method of communication with provider :Note    Inpatient RRAT score: 23  Was this a readmission? yes   Patient stated reason for the readmission: excruciating pain from sickle cell crisis. Nurse Navigator (NN) contacted the patient by at office  to perform post hospital discharge assessment. Verified name and  with patient as identifiers. Provided introduction to self, and explanation of the Nurse Navigator role. Reviewed discharge instructions and red flags with patient who verbalized understanding. Patient given an opportunity to ask questions and does not have any further questions or concerns at this time. The patient agrees to contact the PCP office for questions related to their healthcare. NN provided contact information for future reference. Disease Specific:   none    Summary of patient's top problems: To ED for iincreasing pain in her back, shoulders, and legs; treated aggressively in the emergency room with no meaningful improvement and was admitted for painful control from sickle crisis. Home Health orders at discharge: [address]: none  Date of initial visit: [facility] ordered/company: none  Durable Medical Equipment received: none    Barriers to care? Pain Management; Utilization of Services.     Advance Care Planning:   Does patient have an Advance Directive:  not on file; education provided Healthcare Decision Maker:  Brother Senior    Medication(s):   New Medications at Discharge: none  Changed Medications at Discharge: none  Discontinued Medications at Discharge: Benadryl; Motrin    Medication reconciliation was performed with patient along with PCP, who verbalizes understanding of administration of home medications. There were no barriers to obtaining medications identified at this time. Referral to Pharm D needed: no     Current Outpatient Medications   Medication Sig    HYDROcodone-acetaminophen (NORCO)  mg tablet Take 1 Tab by mouth every six (6) hours as needed for Pain for up to 30 days. Max Daily Amount: 4 Tabs. Indications: sickl;e cell crisis    fentaNYL (DURAGESIC) 100 mcg/hr PATCH 1 Patch by TransDERmal route every seventy-two (72) hours.  hydrocortisone (CORTISONE) 1 % topical cream Apply  to affected area two (2) times daily as needed for Skin Irritation. use thin layer    telmisartan (MICARDIS) 40 mg tablet Take 1 Tab by mouth nightly.  folic acid (FOLVITE) 1 mg tablet Take 1 mg by mouth nightly.  omeprazole (PRILOSEC OTC) 20 mg tablet Take 20 mg by mouth nightly.  promethazine (PHENERGAN) 25 mg tablet Take 1 Tab by mouth every eight (8) hours as needed for Nausea.  citalopram (CELEXA) 10 mg tablet TAKE 1 TABLET BY MOUTH EVERY NIGHT AT BEDTIME     No current facility-administered medications for this visit. There are no discontinued medications. [facility] follow up appointment(s):   Future Appointments   Date Time Provider Waldo Calvo   10/29/2019 12:30 PM August MD Shane [address] [place] [address]      Non-[facility] follow up appointment(s): [facility]-did not give date when asked. Kimberly Johnson     [facility]:  n/a     Goals Addressed                 This Visit's Progress     Attends follow up appointments on schedule   On track     6/7/2019:  Patient scheduled for ROME VAUGHN appt as requested: Friday 6/14/2019:  12PM.  EW     6/25/2019:  Patient returns to f/u with PCP re crisis prevention/medication evaluation. EW     7/26/2019:  Met patient at office visit today. EW   8/12/2019: Follow KRISTINA appointment attended as scheduled. EW        COMPLETED: Coordinate pain management plan for patient. 3/6/2019:  Patient states pain is worst today since d/c due to coldness outside. Patient agreed to warmer clothing, eating less junk foods (sugary products), and drinking an increased water amount. States Fentanyl patch is not working as would like but still uses it along with other pain medications. PCP visit in AM.  EW     5/3/2019:  Patient c/o inability to  fentanyl patch due to needing PA. NN spoke with PharmBERTO Garrido at [facility]; [facility] system is not allowing Fentanyl Patch pickup w/out PA due to Gary/Morphine in Patch, and patient is also receiving Norco.  States patch placed on yesterday at hospital-5/2/2019 and lasts 3 days, but Henrietta Williamson is approved for pickup on Sunday 5/5. Patient given contact #  should there be a problem over the weekend. EW    5/9/2019:  Patient c/o pain today; c/o to PCP of not being able to have Fentanyl Patches any longer due to insurance not covering. PCP provided explanation of addiction potential when given agents of the same medication such as hydrocodone and fentanyl patches. Patient is anemic as well as Hepatitis. Red flags again reviewed by PCP. Red flags reiterated by NN. Teach-back Method:  Patient agreed to balance diet;-minimal sugary products;  maintain use of motrin and Vitamin Supplements and increase water intake and increased activity to avoid stiffness of joints. Will continue to f/u.      6/19/2019:  Pain Management discussion    EW       6/25/2019:  Patient seen at office visit; Patient states going out of town with family/concerned about pain and fearing a crisis while out of town. 7/26/2019:  Patient appears happier; states it was good for her to see her son who is out of town. Liquid intake increased; med compliance re pain managed better. Acidic foods decreased from diet. EW     8/12/2019:  Patient appears to be receiving fentanyl patch and norco pain medications again. Patient discussed in depth w/ PCP. PCP advised weight reduction plan including reduction of processed & heavy carbohydrates. Patient agrees to increase water intake as well. EW        Patient/Family verbalizes understanding of self-management of chronic disease. On track     5/9/2019: Fatoumatacharity Roberts 4/14-5/2/2019:  Sickle Cell Crisis f/u -see coordinate Pain Management Plan goal.  EW     6/7/2019:  Continued teachings done with rationale:    Drink plenty of fluids- dehydrated can increase your risk of a sickle crisis. Recommendation is about 8 glasses a day and drink more fluid if youre exercising or in hot weather. Sleep--encouraged to get enough sleep. Eat right-plenty of fruits, vegetables, whole grains, and protein--discussed the type meat/proteins she enjoyed; Clarisse Garcia Encouraged exercise in moderation for her; to aim to increase simply walking more around the house/any mod exercise a week;  Encouraged patient to PCP before starting a new exercise routine--Patient stated PCP has been on her at every visit to increase exercise/activity on a daily basis--discussed physical activity is key in staying healthy. NN warned against trying to  overdo it. Rest when you get tired. Take medicine as ordered. Make sure  take your prescription medicine as directed. Get medical and lab tests that your doctor recommends. Discussion on getting the annual flu shot, and pneumococcal and meningococcal vaccines as recommended by PCP in that common illnesses, like the flu, can quickly become dangerous. Discussion on Extreme temperatures: extreme heat or cold, or any craig changes in temperatures, could set off a crisis. Reminded patient of High altitude--leading to Lack of oxygen at high altitudes could trigger a crisis. (Reminded that Planes, because theyre pressurized, shouldnt be a problem). Alcohol. It can make you dehydrated--patient denied consumptions of;    Smoking. This can trigger a lung condition called acute chest syndrome. This is when sickle cells stick together and block oxygen from getting into your lungs (even warned patient re walking in and out of stores that smokers congregate smoking). Infections. Common illnesses can be very serious for people with SCD. Wash your hands before eating or after using the bathroom. Wash your fruits and veggies, and avoid raw meat, eggs, and unpasteurized milk. Stress-  Though sometimes hard to avoid, but stress or depression can trigger a crisis (states she has not been depressed as much as usual)--advised to try to take time to relax or find techniques that help you calm down and such to prevent and/or relieve depression episodes. Patient agreed to try. EW      6/14/2019:  [facility] 5/26-6/6/2019:  Sickle Cell Crisis f/u  Patient in for f/u office visit today. 1.  Continued discussion of not being able tto obtain Fentanyl Patch & Hydrocodone simultaneously taken. Discussed the opiod crisis and what were her thoughts on the same. 2.  Supported and discussed PCP concerns for weight gain:   Eating same time each day; avoiding snacks; avoiding consumption of processed carbohydrates. Understanding assured with reiteration of advisement. EW     6/19/2019;  Patient Teach-back done:    Drinking plenty of fluids-verbalized understanding of prevention of increased risk of a sickle crisis. States not 8 glasses yet but more than 5.  .  Sleep--states sleeping better but with medications. .  Eat right- agreed to increased buying of fruits & vegetables, whole grains, and sufficient meats without a lot of fat as protein. .States she is walking more as exercise.  -discussed physical activity is key in staying healthy.  States she is getting more rest.  EW     6/25/2019: Patient at office contact for PCP medication re-evaluation. Patient Review:  Proper diet & weight reduction as reviewed by PCP; The 3 advisements from PCP review: 1. eating meals 2.  eliminating snacks,  3. avoiding the consumption of processed carbs. Patient agrees to work on depression-continue anti-depressants as ordered. Advisement:  Eating the right foods at meals; avoiding sugary foods--advised that healthy eating affects mental and physical status; drinking sufficient water prevents dehydration. EW     8/12/2019:  Pain Coordination discussion w/ PCP. EW    .                  goal completion:  Ongoing.

## 2019-08-23 NOTE — PROGRESS NOTES
[facility] 5/26-6/6/2019: Sickle Cell Crisis f/u  Retro Note completion. Goals      Attends follow up appointments on schedule      6/7/2019:  Patient scheduled for ROME VAUGHN appt as requested: Friday 6/14/2019:  12PM.  EW     6/25/2019:  Patient returns to f/u with PCP re crisis prevention/medication evaluation. EW     7/26/2019:  Met patient at office visit today. EW        Coordinate pain management plan for patient. 3/6/2019:  Patient states pain is worst today since d/c due to coldness outside. Patient agreed to warmer clothing, eating less junk foods (sugary products), and drinking an increased water amount. [facility] Fentanyl patch is not working as would like but still uses it along with other pain medications. PCP visit in AM.  EW     5/3/2019:  Patient c/o inability to  fentanyl patch due to needing PA. NN spoke with Yusef Escalona at [place]; [facility] system is not allowing Fentanyl Patch pickup w/out PA due to [place]/Morphine in Patch, and patient is also receiving Norco.  States patch placed on yesterday at hospital-5/2/2019 and lasts 3 days, but [employer] is approved for pickup on Sunday 5/5. Patient given contact #  should there be a problem over the weekend. EW    5/9/2019:  Patient c/o pain today; c/o to PCP of not being able to have Fentanyl Patches any longer due to insurance not covering. PCP provided explanation of addiction potential when given agents of the same medication such as hydrocodone and fentanyl patches. Patient is anemic as well as Hepatitis. Red flags again reviewed by PCP. Red flags reiterated by NN. Teach-back Method:  Patient agreed to balance diet;-minimal sugary products;  maintain use of motrin and Vitamin Supplements and increase water intake and increased activity to avoid stiffness of joints. Will continue to f/u.      6/19/2019:  Pain Management discussion    EW       6/25/2019:  Patient seen at office visit;   Patient states going out of town with family/concerned about pain and fearing a crisis while out of town. 7/26/2019:  Patient appears happier; states it was good for her to see her son who is out of town. Liquid intake increased; med compliance re pain managed better. Acidic foods decreased from diet. EW        Patient/Family verbalizes understanding of self-management of chronic disease. 5/9/2019: [facility] 4/14-5/2/2019:  Sickle Cell Crisis f/u -see coordinate Pain Management Plan goal.  EW     6/7/2019:  Continued teachings done with rationale:    Drink plenty of fluids- dehydrated can increase your risk of a sickle crisis. Recommendation is about 8 glasses a day and drink more fluid if youre exercising or in hot weather. Sleep--encouraged to get enough sleep. Eat right-plenty of fruits, vegetables, whole grains, and protein--discussed the type meat/proteins she enjoyed; Clarisse Garcia Encouraged exercise in moderation for her; to aim to increase simply walking more around the house/any mod exercise a week;  Encouraged patient to PCP before starting a new exercise routine--Patient stated PCP has been on her at every visit to increase exercise/activity on a daily basis--discussed physical activity is key in staying healthy. NN warned against trying to  overdo it. Rest when you get tired. Take medicine as ordered. Make sure  take your prescription medicine as directed. Get medical and lab tests that your doctor recommends. Discussion on getting the annual flu shot, and pneumococcal and meningococcal vaccines as recommended by PCP in that common illnesses, like the flu, can quickly become dangerous. Discussion on Extreme temperatures: extreme heat or cold, or any craig changes in temperatures, could set off a crisis. Reminded patient of High altitude--leading to Lack of oxygen at high altitudes could trigger a crisis. (Reminded that Planes, because theyre pressurized, shouldnt be a problem). Alcohol.  It can make you dehydrated--patient denied consumptions of;    Smoking. This can trigger a lung condition called acute chest syndrome. This is when sickle cells stick together and block oxygen from getting into your lungs (even warned patient re walking in and out of stores that smokers congregate smoking). Infections. Common illnesses can be very serious for people with SCD. Wash your hands before eating or after using the bathroom. Wash your fruits and veggies, and avoid raw meat, eggs, and unpasteurized milk. Stress-  Though sometimes hard to avoid, but stress or depression can trigger a crisis (states she has not been depressed as much as usual)--advised to try to take time to relax or find techniques that help you calm down and such to prevent and/or relieve depression episodes. Patient agreed to try. EW      6/14/2019:  [facility] 5/26-6/6/2019:  Sickle Cell Crisis f/u  Patient in for f/u office visit today. 1.  Continued discussion of not being able tto obtain Fentanyl Patch & Hydrocodone simultaneously taken. Discussed the opiod crisis and what were her thoughts on the same. 2.  Supported and discussed PCP concerns for weight gain:   Eating same time each day; avoiding snacks; avoiding consumption of processed carbohydrates. Understanding assured with reiteration of advisement. EW     6/19/2019;  Patient Teach-back done:    Drinking plenty of fluids-verbalized understanding of prevention of increased risk of a sickle crisis. States not 8 glasses yet but more than 5.  .  Sleep--states sleeping better but with medications. .  Eat right- agreed to increased buying of fruits & vegetables, whole grains, and sufficient meats without a lot of fat as protein. .States she is walking more as exercise.  -discussed physical activity is key in staying healthy. States she is getting more rest.  EW     6/25/2019:  Patient at office contact for PCP medication re-evaluation.     Patient Review:  Proper diet & weight reduction as reviewed by PCP;  The 3 advisements from PCP review: 1. eating meals 2.  eliminating snacks,  3. avoiding the consumption of processed carbs. Patient agrees to work on depression-continue anti-depressants as ordered. Advisement:  Eating the right foods at meals; avoiding sugary foods--advised that healthy eating affects mental and physical status; drinking sufficient water prevents dehydration.   EW    .                  goal completion: today

## 2019-09-04 ENCOUNTER — PATIENT OUTREACH (OUTPATIENT)
Dept: INTERNAL MEDICINE CLINIC | Age: 59
End: 2019-09-04

## 2019-09-04 DIAGNOSIS — D57.1 HB-SS DISEASE WITHOUT CRISIS (HCC): ICD-10-CM

## 2019-09-04 RX ORDER — HYDROCODONE BITARTRATE AND ACETAMINOPHEN 10; 325 MG/1; MG/1
1 TABLET ORAL
Qty: 120 TAB | Refills: 0 | Status: ON HOLD | OUTPATIENT
Start: 2019-09-04 | End: 2019-10-17

## 2019-09-06 ENCOUNTER — HOSPITAL ENCOUNTER (INPATIENT)
Age: 59
LOS: 14 days | Discharge: HOME | DRG: 812 | End: 2019-09-20
Attending: EMERGENCY MEDICINE | Admitting: HOSPITALIST
Payer: MEDICARE

## 2019-09-06 DIAGNOSIS — G89.29 OTHER CHRONIC PAIN: Primary | ICD-10-CM

## 2019-09-06 DIAGNOSIS — D57.00 SICKLE CELL ANEMIA WITH PAIN (HCC): ICD-10-CM

## 2019-09-06 LAB
ALBUMIN SERPL-MCNC: 3.9 G/DL (ref 3.5–5)
ALBUMIN/GLOB SERPL: 0.9 {RATIO} (ref 1.1–2.2)
ALP SERPL-CCNC: 120 U/L (ref 45–117)
ALT SERPL-CCNC: 21 U/L (ref 12–78)
ANION GAP SERPL CALC-SCNC: 4 MMOL/L (ref 5–15)
AST SERPL-CCNC: 37 U/L (ref 15–37)
BASOPHILS # BLD: 0.1 K/UL (ref 0–0.1)
BASOPHILS NFR BLD: 1 % (ref 0–1)
BILIRUB SERPL-MCNC: 1.6 MG/DL (ref 0.2–1)
BUN SERPL-MCNC: 8 MG/DL (ref 6–20)
BUN/CREAT SERPL: 11 (ref 12–20)
CALCIUM SERPL-MCNC: 8.5 MG/DL (ref 8.5–10.1)
CHLORIDE SERPL-SCNC: 108 MMOL/L (ref 97–108)
CO2 SERPL-SCNC: 27 MMOL/L (ref 21–32)
CREAT SERPL-MCNC: 0.71 MG/DL (ref 0.55–1.02)
DIFFERENTIAL METHOD BLD: ABNORMAL
EOSINOPHIL # BLD: 0.3 K/UL (ref 0–0.4)
EOSINOPHIL NFR BLD: 4 % (ref 0–7)
ERYTHROCYTE [DISTWIDTH] IN BLOOD BY AUTOMATED COUNT: 13.8 % (ref 11.5–14.5)
GLOBULIN SER CALC-MCNC: 4.5 G/DL (ref 2–4)
GLUCOSE SERPL-MCNC: 95 MG/DL (ref 65–100)
HCT VFR BLD AUTO: 25.6 % (ref 35–47)
HGB BLD-MCNC: 8.8 G/DL (ref 11.5–16)
IMM GRANULOCYTES # BLD AUTO: 0 K/UL (ref 0–0.04)
IMM GRANULOCYTES NFR BLD AUTO: 0 % (ref 0–0.5)
LDH SERPL L TO P-CCNC: 352 U/L (ref 81–246)
LYMPHOCYTES # BLD: 4.7 K/UL (ref 0.8–3.5)
LYMPHOCYTES NFR BLD: 52 % (ref 12–49)
MCH RBC QN AUTO: 32 PG (ref 26–34)
MCHC RBC AUTO-ENTMCNC: 34.4 G/DL (ref 30–36.5)
MCV RBC AUTO: 93.1 FL (ref 80–99)
MONOCYTES # BLD: 0.9 K/UL (ref 0–1)
MONOCYTES NFR BLD: 10 % (ref 5–13)
NEUTS SEG # BLD: 2.9 K/UL (ref 1.8–8)
NEUTS SEG NFR BLD: 33 % (ref 32–75)
NRBC # BLD: 0.03 K/UL (ref 0–0.01)
NRBC BLD-RTO: 0.3 PER 100 WBC
PLATELET # BLD AUTO: 193 K/UL (ref 150–400)
PMV BLD AUTO: 12 FL (ref 8.9–12.9)
POTASSIUM SERPL-SCNC: 3.9 MMOL/L (ref 3.5–5.1)
PROT SERPL-MCNC: 8.4 G/DL (ref 6.4–8.2)
RBC # BLD AUTO: 2.75 M/UL (ref 3.8–5.2)
RETICS # AUTO: 0.12 M/UL (ref 0.02–0.08)
RETICS/RBC NFR AUTO: 4.5 % (ref 0.7–2.1)
SODIUM SERPL-SCNC: 139 MMOL/L (ref 136–145)
WBC # BLD AUTO: 8.9 K/UL (ref 3.6–11)

## 2019-09-06 PROCEDURE — 94762 N-INVAS EAR/PLS OXIMTRY CONT: CPT

## 2019-09-06 PROCEDURE — 74011000258 HC RX REV CODE- 258: Performed by: HOSPITALIST

## 2019-09-06 PROCEDURE — 65270000029 HC RM PRIVATE

## 2019-09-06 PROCEDURE — 74011250636 HC RX REV CODE- 250/636: Performed by: EMERGENCY MEDICINE

## 2019-09-06 PROCEDURE — 77010033678 HC OXYGEN DAILY

## 2019-09-06 PROCEDURE — 85045 AUTOMATED RETICULOCYTE COUNT: CPT

## 2019-09-06 PROCEDURE — 99284 EMERGENCY DEPT VISIT MOD MDM: CPT

## 2019-09-06 PROCEDURE — 80053 COMPREHEN METABOLIC PANEL: CPT

## 2019-09-06 PROCEDURE — 96374 THER/PROPH/DIAG INJ IV PUSH: CPT

## 2019-09-06 PROCEDURE — 96372 THER/PROPH/DIAG INJ SC/IM: CPT

## 2019-09-06 PROCEDURE — 74011250636 HC RX REV CODE- 250/636: Performed by: INTERNAL MEDICINE

## 2019-09-06 PROCEDURE — 85025 COMPLETE CBC W/AUTO DIFF WBC: CPT

## 2019-09-06 PROCEDURE — 74011250636 HC RX REV CODE- 250/636: Performed by: HOSPITALIST

## 2019-09-06 PROCEDURE — 36415 COLL VENOUS BLD VENIPUNCTURE: CPT

## 2019-09-06 PROCEDURE — 96361 HYDRATE IV INFUSION ADD-ON: CPT

## 2019-09-06 PROCEDURE — 83615 LACTATE (LD) (LDH) ENZYME: CPT

## 2019-09-06 RX ORDER — ACETAMINOPHEN 325 MG/1
650 TABLET ORAL
Status: DISCONTINUED | OUTPATIENT
Start: 2019-09-06 | End: 2019-09-20 | Stop reason: HOSPADM

## 2019-09-06 RX ORDER — HYDROMORPHONE HYDROCHLORIDE 1 MG/ML
1 INJECTION, SOLUTION INTRAMUSCULAR; INTRAVENOUS; SUBCUTANEOUS
Status: DISCONTINUED | OUTPATIENT
Start: 2019-09-06 | End: 2019-09-06 | Stop reason: SDUPTHER

## 2019-09-06 RX ORDER — SODIUM CHLORIDE 450 MG/100ML
125 INJECTION, SOLUTION INTRAVENOUS CONTINUOUS
Status: DISCONTINUED | OUTPATIENT
Start: 2019-09-06 | End: 2019-09-06

## 2019-09-06 RX ORDER — LIDOCAINE HYDROCHLORIDE 20 MG/ML
1.5 INJECTION, SOLUTION EPIDURAL; INFILTRATION; INTRACAUDAL; PERINEURAL
Status: COMPLETED | OUTPATIENT
Start: 2019-09-06 | End: 2019-09-06

## 2019-09-06 RX ORDER — FENTANYL CITRATE 50 UG/ML
25 INJECTION, SOLUTION INTRAMUSCULAR; INTRAVENOUS
Status: DISCONTINUED | OUTPATIENT
Start: 2019-09-06 | End: 2019-09-06

## 2019-09-06 RX ORDER — SODIUM CHLORIDE 0.9 % (FLUSH) 0.9 %
5-40 SYRINGE (ML) INJECTION EVERY 8 HOURS
Status: DISCONTINUED | OUTPATIENT
Start: 2019-09-06 | End: 2019-09-20 | Stop reason: HOSPADM

## 2019-09-06 RX ORDER — FENTANYL CITRATE 50 UG/ML
50 INJECTION, SOLUTION INTRAMUSCULAR; INTRAVENOUS
Status: ACTIVE | OUTPATIENT
Start: 2019-09-06 | End: 2019-09-06

## 2019-09-06 RX ORDER — KETOROLAC TROMETHAMINE 30 MG/ML
30 INJECTION, SOLUTION INTRAMUSCULAR; INTRAVENOUS
Status: DISPENSED | OUTPATIENT
Start: 2019-09-06 | End: 2019-09-11

## 2019-09-06 RX ORDER — HEPARIN SODIUM 5000 [USP'U]/ML
5000 INJECTION, SOLUTION INTRAVENOUS; SUBCUTANEOUS EVERY 8 HOURS
Status: DISCONTINUED | OUTPATIENT
Start: 2019-09-06 | End: 2019-09-06

## 2019-09-06 RX ORDER — ENOXAPARIN SODIUM 100 MG/ML
40 INJECTION SUBCUTANEOUS EVERY 24 HOURS
Status: DISCONTINUED | OUTPATIENT
Start: 2019-09-06 | End: 2019-09-20 | Stop reason: HOSPADM

## 2019-09-06 RX ORDER — DIPHENHYDRAMINE HYDROCHLORIDE 50 MG/ML
12.5 INJECTION, SOLUTION INTRAMUSCULAR; INTRAVENOUS
Status: DISCONTINUED | OUTPATIENT
Start: 2019-09-06 | End: 2019-09-20 | Stop reason: HOSPADM

## 2019-09-06 RX ORDER — ONDANSETRON 2 MG/ML
4 INJECTION INTRAMUSCULAR; INTRAVENOUS
Status: DISCONTINUED | OUTPATIENT
Start: 2019-09-06 | End: 2019-09-20 | Stop reason: HOSPADM

## 2019-09-06 RX ORDER — DEXTROSE, SODIUM CHLORIDE, AND POTASSIUM CHLORIDE 5; .45; .15 G/100ML; G/100ML; G/100ML
25 INJECTION INTRAVENOUS CONTINUOUS
Status: DISCONTINUED | OUTPATIENT
Start: 2019-09-06 | End: 2019-09-19

## 2019-09-06 RX ORDER — HYDROMORPHONE HYDROCHLORIDE 2 MG/ML
1 INJECTION, SOLUTION INTRAMUSCULAR; INTRAVENOUS; SUBCUTANEOUS
Status: DISCONTINUED | OUTPATIENT
Start: 2019-09-06 | End: 2019-09-15

## 2019-09-06 RX ORDER — SODIUM CHLORIDE 0.9 % (FLUSH) 0.9 %
5-40 SYRINGE (ML) INJECTION AS NEEDED
Status: DISCONTINUED | OUTPATIENT
Start: 2019-09-06 | End: 2019-09-20 | Stop reason: HOSPADM

## 2019-09-06 RX ORDER — KETOROLAC TROMETHAMINE 30 MG/ML
30 INJECTION, SOLUTION INTRAMUSCULAR; INTRAVENOUS ONCE
Status: COMPLETED | OUTPATIENT
Start: 2019-09-06 | End: 2019-09-06

## 2019-09-06 RX ADMIN — HYDROMORPHONE HYDROCHLORIDE 1 MG: 2 INJECTION, SOLUTION INTRAMUSCULAR; INTRAVENOUS; SUBCUTANEOUS at 19:42

## 2019-09-06 RX ADMIN — HYDROMORPHONE HYDROCHLORIDE 1 MG: 2 INJECTION, SOLUTION INTRAMUSCULAR; INTRAVENOUS; SUBCUTANEOUS at 13:36

## 2019-09-06 RX ADMIN — HYDROMORPHONE HYDROCHLORIDE 1 MG: 2 INJECTION, SOLUTION INTRAMUSCULAR; INTRAVENOUS; SUBCUTANEOUS at 10:36

## 2019-09-06 RX ADMIN — ENOXAPARIN SODIUM 40 MG: 40 INJECTION, SOLUTION INTRAVENOUS; SUBCUTANEOUS at 18:03

## 2019-09-06 RX ADMIN — HEPARIN SODIUM 5000 UNITS: 5000 INJECTION INTRAVENOUS; SUBCUTANEOUS at 03:26

## 2019-09-06 RX ADMIN — DIPHENHYDRAMINE HYDROCHLORIDE 12.5 MG: 50 INJECTION, SOLUTION INTRAMUSCULAR; INTRAVENOUS at 11:33

## 2019-09-06 RX ADMIN — ONDANSETRON 4 MG: 2 INJECTION INTRAMUSCULAR; INTRAVENOUS at 11:35

## 2019-09-06 RX ADMIN — HEPARIN SODIUM 5000 UNITS: 5000 INJECTION INTRAVENOUS; SUBCUTANEOUS at 10:41

## 2019-09-06 RX ADMIN — Medication 10 ML: at 13:38

## 2019-09-06 RX ADMIN — HYDROMORPHONE HYDROCHLORIDE 1 MG: 2 INJECTION, SOLUTION INTRAMUSCULAR; INTRAVENOUS; SUBCUTANEOUS at 16:33

## 2019-09-06 RX ADMIN — SODIUM CHLORIDE 1000 ML: 900 INJECTION, SOLUTION INTRAVENOUS at 02:17

## 2019-09-06 RX ADMIN — SODIUM CHLORIDE 125 ML/HR: 450 INJECTION, SOLUTION INTRAVENOUS at 03:26

## 2019-09-06 RX ADMIN — KETOROLAC TROMETHAMINE 30 MG: 30 INJECTION, SOLUTION INTRAMUSCULAR at 02:17

## 2019-09-06 RX ADMIN — ONDANSETRON 4 MG: 2 INJECTION INTRAMUSCULAR; INTRAVENOUS at 19:42

## 2019-09-06 RX ADMIN — HYDROMORPHONE HYDROCHLORIDE 1 MG: 2 INJECTION, SOLUTION INTRAMUSCULAR; INTRAVENOUS; SUBCUTANEOUS at 23:31

## 2019-09-06 RX ADMIN — DIPHENHYDRAMINE HYDROCHLORIDE 12.5 MG: 50 INJECTION, SOLUTION INTRAMUSCULAR; INTRAVENOUS at 15:36

## 2019-09-06 RX ADMIN — KETOROLAC TROMETHAMINE 30 MG: 30 INJECTION, SOLUTION INTRAMUSCULAR at 21:21

## 2019-09-06 RX ADMIN — DIPHENHYDRAMINE HYDROCHLORIDE 12.5 MG: 50 INJECTION, SOLUTION INTRAMUSCULAR; INTRAVENOUS at 19:42

## 2019-09-06 RX ADMIN — Medication 10 ML: at 15:36

## 2019-09-06 RX ADMIN — Medication 10 ML: at 11:34

## 2019-09-06 RX ADMIN — ONDANSETRON 4 MG: 2 INJECTION INTRAMUSCULAR; INTRAVENOUS at 07:29

## 2019-09-06 RX ADMIN — HYDROMORPHONE HYDROCHLORIDE 1 MG: 2 INJECTION, SOLUTION INTRAMUSCULAR; INTRAVENOUS; SUBCUTANEOUS at 07:30

## 2019-09-06 RX ADMIN — SODIUM CHLORIDE 125 ML/HR: 450 INJECTION, SOLUTION INTRAVENOUS at 15:40

## 2019-09-06 RX ADMIN — SODIUM CHLORIDE 125 ML/HR: 450 INJECTION, SOLUTION INTRAVENOUS at 08:04

## 2019-09-06 RX ADMIN — HYDROMORPHONE HYDROCHLORIDE 1 MG: 1 INJECTION, SOLUTION INTRAMUSCULAR; INTRAVENOUS; SUBCUTANEOUS at 03:26

## 2019-09-06 RX ADMIN — ONDANSETRON 4 MG: 2 INJECTION INTRAMUSCULAR; INTRAVENOUS at 03:27

## 2019-09-06 RX ADMIN — DIPHENHYDRAMINE HYDROCHLORIDE 12.5 MG: 50 INJECTION, SOLUTION INTRAMUSCULAR; INTRAVENOUS at 03:26

## 2019-09-06 RX ADMIN — Medication 5 ML: at 21:21

## 2019-09-06 RX ADMIN — DIPHENHYDRAMINE HYDROCHLORIDE 12.5 MG: 50 INJECTION, SOLUTION INTRAMUSCULAR; INTRAVENOUS at 23:31

## 2019-09-06 RX ADMIN — LIDOCAINE HYDROCHLORIDE 138 MG: 20 INJECTION, SOLUTION EPIDURAL; INFILTRATION; INTRACAUDAL; PERINEURAL at 02:18

## 2019-09-06 RX ADMIN — Medication 5 ML: at 07:30

## 2019-09-06 RX ADMIN — DIPHENHYDRAMINE HYDROCHLORIDE 12.5 MG: 50 INJECTION, SOLUTION INTRAMUSCULAR; INTRAVENOUS at 07:29

## 2019-09-06 RX ADMIN — DEXTROSE MONOHYDRATE, SODIUM CHLORIDE, AND POTASSIUM CHLORIDE 100 ML/HR: 50; 4.5; 1.49 INJECTION, SOLUTION INTRAVENOUS at 18:07

## 2019-09-06 RX ADMIN — ONDANSETRON 4 MG: 2 INJECTION INTRAMUSCULAR; INTRAVENOUS at 23:31

## 2019-09-06 NOTE — H&P
HISTORY AND PHYSICAL      PCP: Jericho Atkinson MD  History source: the patient      CC: sickle cell pain crisis      HPI: 66 y.o lady w/ sickle cell disease, depression, HTN, HCV, who presents with bilateral shoulder, elbow, and hip pain. Symptoms began 3 days ago and have been progressive. She took her hydrocodone without relief. She denies fever, chest pain, or dyspnea. She states the pain is typical of her pain crises. PMH/PSH:  Past Medical History:   Diagnosis Date    Anemia     SC hemoglobinopathy    Chronic pain     Depression     Hypertension     Liver disease     hepatitis C; pt reports \"cured\"    Sickle cell disease ([facility] [address]      Past Surgical History:   Procedure Laterality Date    BREAST SURGERY PROCEDURE UNLISTED      2 cysts removed from R breast    CHEST SURGERY PROCEDURE UNLISTED      status post thor for removal of a benign     HX BREAST BIOPSY Right 05/2007    negative    HX CHOLECYSTECTOMY      HX ORTHOPAEDIC      bony curettage of an ostial myelitis focus       Home meds:   Prior to Admission medications    Medication Sig Start Date End Date Taking? Authorizing Provider   HYDROcodone-acetaminophen (NORCO)  mg tablet Take 1 Tab by mouth every six (6) hours as needed for Pain for up to 30 days. Max Daily Amount: 4 Tabs. Dx.D57.1  Indications: sickl;e cell crisis 9/4/19 10/4/19 Yes Jericho Atkinson MD   hydrocortisone (CORTISONE) 1 % topical cream Apply  to affected area two (2) times daily as needed for Skin Irritation. use thin layer 7/26/19  Yes Jericho Atkinson MD   telmisartan (MICARDIS) 40 mg tablet Take 1 Tab by mouth nightly. 7/1/19  Yes Jericho Atkinson MD   folic acid (FOLVITE) 1 mg tablet Take 1 mg by mouth nightly. Yes Provider, Historical   omeprazole (PRILOSEC OTC) 20 mg tablet Take 20 mg by mouth nightly. Yes Other, MD Ean   promethazine (PHENERGAN) 25 mg tablet Take 1 Tab by mouth every eight (8) hours as needed for Nausea.  5/9/19  Yes Jericho Atkinson MD   citalopram (CELEXA) 10 mg tablet TAKE 1 TABLET BY MOUTH EVERY NIGHT AT BEDTIME 5/9/19  Yes Ricky Hopkins MD       Allergies: Allergies   Allergen Reactions    Dilaudid [Hydromorphone (Bulk)] Itching     Can tolerate with benadryl and ondansetron    Percocet [Oxycodone-Acetaminophen] Itching       FH:  Family History   Problem Relation Age of Onset   [facility] Hypertension Mother     Hypertension Father        SH:  Social History     Tobacco Use    Smoking status: Never Smoker    Smokeless tobacco: Never Used   Substance Use Topics    Alcohol use: No       ROS: A comprehensive review of systems was negative except for that written in the HPI. PHYSICAL EXAM:  Visit Vitals  /84   Pulse 81   Temp 99 °F (37.2 °C)   Resp 23   Ht 5' 10\" (1.778 m)   Wt 92 kg (202 lb 13.2 oz)   SpO2 97%   BMI 29.10 kg/m²       Gen: NAD, non-toxic  HEENT: anicteric sclerae, normal conjunctiva, oropharynx clear, MM moist  Neck: supple, trachea midline, no adenopathy  Heart: RRR, no MRG, no JVD, no peripheral edema  Lungs: CTA b/l, non-labored respirations  Abd: soft, NT, ND, BS+, no organomegaly  Extr: warm  Skin: dry, no rash  Neuro: CN II-XII grossly intact, normal speech, moves all extremities  Psych: normal mood, appropriate affect      Labs/Imaging:  Recent Results (from the past 24 hour(s))   CBC WITH AUTOMATED DIFF    Collection Time: 09/06/19  1:47 AM   Result Value Ref Range    WBC 8.9 3.6 - 11.0 K/uL    RBC 2.75 (L) 3.80 - 5.20 M/uL    HGB 8.8 (L) 11.5 - 16.0 g/dL    HCT 25.6 (L) 35.0 - 47.0 %    MCV 93.1 80.0 - 99.0 FL    MCH 32.0 26.0 - 34.0 PG    MCHC 34.4 30.0 - 36.5 g/dL    RDW 13.8 11.5 - 14.5 %    PLATELET 517 385 - 116 K/uL    MPV 12.0 8.9 - 12.9 FL    NRBC 0.3 (H) 0  WBC    ABSOLUTE NRBC 0.03 (H) 0.00 - 0.01 K/uL    NEUTROPHILS 33 32 - 75 %    LYMPHOCYTES 52 (H) 12 - 49 %    MONOCYTES 10 5 - 13 %    EOSINOPHILS 4 0 - 7 %    BASOPHILS 1 0 - 1 %    IMMATURE GRANULOCYTES 0 0.0 - 0.5 %    ABS. NEUTROPHILS 2.9 1.8 - 8.0 K/UL    ABS. LYMPHOCYTES 4.7 (H) 0.8 - 3.5 K/UL    ABS. MONOCYTES 0.9 0.0 - 1.0 K/UL    ABS. EOSINOPHILS 0.3 0.0 - 0.4 K/UL    ABS. BASOPHILS 0.1 0.0 - 0.1 K/UL    ABS. IMM. GRANS. 0.0 0.00 - 0.04 K/UL    DF AUTOMATED     METABOLIC PANEL, COMPREHENSIVE    Collection Time: 09/06/19  1:47 AM   Result Value Ref Range    Sodium 139 136 - 145 mmol/L    Potassium 3.9 3.5 - 5.1 mmol/L    Chloride 108 97 - 108 mmol/L    CO2 27 21 - 32 mmol/L    Anion gap 4 (L) 5 - 15 mmol/L    Glucose 95 65 - 100 mg/dL    BUN 8 6 - 20 MG/DL    Creatinine 0.71 0.55 - 1.02 MG/DL    BUN/Creatinine ratio 11 (L) 12 - 20      GFR est AA >60 >60 ml/min/1.73m2    GFR est non-AA >60 >60 ml/min/1.73m2    Calcium 8.5 8.5 - 10.1 MG/DL    Bilirubin, total 1.6 (H) 0.2 - 1.0 MG/DL    ALT (SGPT) 21 12 - 78 U/L    AST (SGOT) 37 15 - 37 U/L    Alk. phosphatase 120 (H) 45 - 117 U/L    Protein, total 8.4 (H) 6.4 - 8.2 g/dL    Albumin 3.9 3.5 - 5.0 g/dL    Globulin 4.5 (H) 2.0 - 4.0 g/dL    A-G Ratio 0.9 (L) 1.1 - 2.2     RETICULOCYTE COUNT    Collection Time: 09/06/19  1:47 AM   Result Value Ref Range    Reticulocyte count 4.5 (H) 0.7 - 2.1 %    Absolute Retic Cnt. 0.1191 (H) 0.0164 - 0.0776 M/ul   LD    Collection Time: 09/06/19  1:47 AM   Result Value Ref Range     (H) 81 - 246 U/L       Recent Labs     09/06/19  0147   WBC 8.9   HGB 8.8*   HCT 25.6*        Recent Labs     09/06/19 0147      K 3.9      CO2 27   BUN 8   CREA 0.71   GLU 95   CA 8.5     Recent Labs     09/06/19 0147   SGOT 37   ALT 21   *   TBILI 1.6*   TP 8.4*   ALB 3.9   GLOB 4.5*       No results for input(s): CPK, CKNDX, TROIQ in the last 72 hours. No lab exists for component: CPKMB    No results for input(s): INR, PTP, APTT in the last 72 hours. No lab exists for component: INREXT     No results for input(s): PH, PCO2, PO2 in the last 72 hours.           Assessment & Plan:     Sickle cell pain crisis:  -hypotonic IV fluids  -pain control - PRN acetaminophen, IV ketorolac, IV dilaudid.  Pt states the latter works best for her, and that she develops itching with all opiates and must have diphenhydramine with them  -monitor counts    HTN    Depression    DVT ppx: sq Lovenox  Code status: full  Disposition: home when ready    Signed By: Meño Johnson MD     September 6, 2019

## 2019-09-06 NOTE — ED PROVIDER NOTES
EMERGENCY DEPARTMENT HISTORY AND PHYSICAL EXAM          Date: 9/6/2019  Patient Name: Francisca Espinal    History of Presenting Illness     Chief Complaint   Patient presents with    Other     pt presents with c/o sickle cell crisis, reports started on Tuesday progressively worse with pain to dori hips, elbows and shoulders. History Provided By: Patient    HPI: Francisca Espinal is a 66 y.o. female, pmhx depression, hypertension, sickle cell disease, prior history of hepatitis C, who presents ambulatory to the ED c/o joint pain    Patient states she is presenting with a sickle cell flare as she has pain shoulders elbows and hips. She notes her symptoms started earlier in the week approximately Tuesday Wednesday and progressively worsened today and overnight. Patient specifically denies any recent fevers, chills, nausea, vomiting, diarrhea, abd pain, CP, urinary sxs, changes in BM, or headache but states she felt short of breath while driving to the hospital.      PCP: Pepe Navarrete MD    Allergies: Dilaudid (patient states she is not allergic if she receives IV Benadryl) and Percocet  Social Hx: -tobacco, -EtOH, -Illicit Drugs    There are no other complaints, changes, or physical findings at this time.      Current Facility-Administered Medications   Medication Dose Route Frequency Provider Last Rate Last Dose    fentaNYL citrate (PF) injection 50 mcg  50 mcg SubCUTAneous Andrew Holloway MD        sodium chloride (NS) flush 5-40 mL  5-40 mL IntraVENous Q8H Olamide Barbosa MD        sodium chloride (NS) flush 5-40 mL  5-40 mL IntraVENous PRN Olamide Barbosa MD        acetaminophen (TYLENOL) tablet 650 mg  650 mg Oral Q4H PRN Ozzy Masterson MD        heparin (porcine) injection 5,000 Units  5,000 Units SubCUTAneous Q8H Olamide Barbosa MD        ondansetron [facility]) injection 4 mg  4 mg IntraVENous Q4H PRN Ozzy Masterson MD        fentaNYL citrate (PF) injection 25 mcg  25 mcg IntraVENous Q4H PRN Venita Johnson MD        ketorolac (TORADOL) injection 30 mg  30 mg IntraVENous Q6H PRN Justa Barbosa MD        0.45% sodium chloride infusion  125 mL/hr IntraVENous CONTINUOUS Justa Barbosa MD         Current Outpatient Medications   Medication Sig Dispense Refill    HYDROcodone-acetaminophen (NORCO)  mg tablet Take 1 Tab by mouth every six (6) hours as needed for Pain for up to 30 days. Max Daily Amount: 4 Tabs. Dx.D57.1  Indications: sickl;e cell crisis 120 Tab 0    hydrocortisone (CORTISONE) 1 % topical cream Apply  to affected area two (2) times daily as needed for Skin Irritation. use thin layer 30 g 11    telmisartan (MICARDIS) 40 mg tablet Take 1 Tab by mouth nightly. 30 Tab 11    folic acid (FOLVITE) 1 mg tablet Take 1 mg by mouth nightly.  omeprazole (PRILOSEC OTC) 20 mg tablet Take 20 mg by mouth nightly.  promethazine (PHENERGAN) 25 mg tablet Take 1 Tab by mouth every eight (8) hours as needed for Nausea.  90 Tab 11    citalopram (CELEXA) 10 mg tablet TAKE 1 TABLET BY MOUTH EVERY NIGHT AT BEDTIME 90 Tab 3       Past History     Past Medical History:  Past Medical History:   Diagnosis Date    Anemia     SC hemoglobinopathy    Chronic pain     Depression     Hypertension     Liver disease     hepatitis C; pt reports \"cured\"    Sickle cell disease ([address]        Past Surgical History:  Past Surgical History:   Procedure Laterality Date    BREAST SURGERY PROCEDURE UNLISTED      2 cysts removed from R breast    CHEST SURGERY PROCEDURE UNLISTED      status post thor for removal of a benign     HX BREAST BIOPSY Right 05/2007    negative    HX CHOLECYSTECTOMY      HX ORTHOPAEDIC      bony curettage of an ostial myelitis focus       Family History:  Family History   Problem Relation Age of Onset    Hypertension Mother     Hypertension Father        Social History:  Social History     Tobacco Use    Smoking status: Never Smoker    Smokeless tobacco: Never Used   Substance Use Topics    Alcohol use: No    Drug use: No       Allergies: Allergies   Allergen Reactions    Dilaudid [Hydromorphone (Bulk)] Itching     Can tolerate with benadryl and ondansetron    Percocet [Oxycodone-Acetaminophen] Itching         Review of Systems   Review of Systems   Constitutional: Positive for fatigue. Negative for activity change, appetite change, chills, fever and unexpected weight change. HENT: Negative for congestion. Eyes: Negative for pain and visual disturbance. Respiratory: Positive for shortness of breath. Negative for cough and chest tightness. Cardiovascular: Negative for chest pain, palpitations and leg swelling. Gastrointestinal: Negative for abdominal pain, diarrhea, nausea and vomiting. Genitourinary: Negative for dysuria. Musculoskeletal: Positive for arthralgias. Negative for back pain, gait problem, joint swelling, myalgias, neck pain and neck stiffness. Skin: Negative for rash. Neurological: Negative for headaches. Physical Exam   Physical Exam   Constitutional: She is oriented to person, place, and time. She appears well-developed and well-nourished. Overweight middle-aged female currently in mild distress due to pain symptoms   HENT:   Head: Normocephalic and atraumatic. Mouth/Throat: Oropharynx is clear and moist.   Eyes: Pupils are equal, round, and reactive to light. Conjunctivae and EOM are normal. Right eye exhibits no discharge. Left eye exhibits no discharge. Neck: Normal range of motion. Neck supple. Cardiovascular: Normal rate and regular rhythm. Murmur (2 out of 6 right-sided early systolic murmur) heard. Pulmonary/Chest: Effort normal and breath sounds normal. No respiratory distress. She has no wheezes. She has no rales. Abdominal: Soft. Bowel sounds are normal. She exhibits no distension. There is no tenderness. Musculoskeletal: Normal range of motion. She exhibits no edema.    Neurological: She is alert and oriented to person, place, and time. No cranial nerve deficit. She exhibits normal muscle tone. Skin: Skin is warm and dry. No rash noted. She is not diaphoretic. Nursing note and vitals reviewed. Diagnostic Study Results     Labs -     Recent Results (from the past 12 hour(s))   CBC WITH AUTOMATED DIFF    Collection Time: 09/06/19  1:47 AM   Result Value Ref Range    WBC 8.9 3.6 - 11.0 K/uL    RBC 2.75 (L) 3.80 - 5.20 M/uL    HGB 8.8 (L) 11.5 - 16.0 g/dL    HCT 25.6 (L) 35.0 - 47.0 %    MCV 93.1 80.0 - 99.0 FL    MCH 32.0 26.0 - 34.0 PG    MCHC 34.4 30.0 - 36.5 g/dL    RDW 13.8 11.5 - 14.5 %    PLATELET 987 364 - 441 K/uL    MPV 12.0 8.9 - 12.9 FL    NRBC 0.3 (H) 0  WBC    ABSOLUTE NRBC 0.03 (H) 0.00 - 0.01 K/uL    NEUTROPHILS 33 32 - 75 %    LYMPHOCYTES 52 (H) 12 - 49 %    MONOCYTES 10 5 - 13 %    EOSINOPHILS 4 0 - 7 %    BASOPHILS 1 0 - 1 %    IMMATURE GRANULOCYTES 0 0.0 - 0.5 %    ABS. NEUTROPHILS 2.9 1.8 - 8.0 K/UL    ABS. LYMPHOCYTES 4.7 (H) 0.8 - 3.5 K/UL    ABS. MONOCYTES 0.9 0.0 - 1.0 K/UL    ABS. EOSINOPHILS 0.3 0.0 - 0.4 K/UL    ABS. BASOPHILS 0.1 0.0 - 0.1 K/UL    ABS. IMM. GRANS. 0.0 0.00 - 0.04 K/UL    DF AUTOMATED     METABOLIC PANEL, COMPREHENSIVE    Collection Time: 09/06/19  1:47 AM   Result Value Ref Range    Sodium 139 136 - 145 mmol/L    Potassium 3.9 3.5 - 5.1 mmol/L    Chloride 108 97 - 108 mmol/L    CO2 27 21 - 32 mmol/L    Anion gap 4 (L) 5 - 15 mmol/L    Glucose 95 65 - 100 mg/dL    BUN 8 6 - 20 MG/DL    Creatinine 0.71 0.55 - 1.02 MG/DL    BUN/Creatinine ratio 11 (L) 12 - 20      GFR est AA >60 >60 ml/min/1.73m2    GFR est non-AA >60 >60 ml/min/1.73m2    Calcium 8.5 8.5 - 10.1 MG/DL    Bilirubin, total 1.6 (H) 0.2 - 1.0 MG/DL    ALT (SGPT) 21 12 - 78 U/L    AST (SGOT) 37 15 - 37 U/L    Alk.  phosphatase 120 (H) 45 - 117 U/L    Protein, total 8.4 (H) 6.4 - 8.2 g/dL    Albumin 3.9 3.5 - 5.0 g/dL    Globulin 4.5 (H) 2.0 - 4.0 g/dL    A-G Ratio 0.9 (L) 1.1 - 2.2 RETICULOCYTE COUNT    Collection Time: 09/06/19  1:47 AM   Result Value Ref Range    Reticulocyte count 4.5 (H) 0.7 - 2.1 %    Absolute Retic Cnt. 0.1191 (H) 0.0164 - 0.0776 M/ul   LD    Collection Time: 09/06/19  1:47 AM   Result Value Ref Range     (H) 81 - 246 U/L       Radiologic Studies -   No orders to display     CT Results  (Last 48 hours)    None        CXR Results  (Last 48 hours)    None            Medical Decision Making   I am the first provider for this patient. I reviewed the vital signs, available nursing notes, past medical history, past surgical history, family history and social history. Vital Signs-Reviewed the patient's vital signs. Patient Vitals for the past 12 hrs:   Temp Pulse Resp BP SpO2   09/06/19 0230  81 23 143/84 97 %   09/06/19 0217  83 16  99 %   09/06/19 0216    (!) 152/92    09/06/19 0109 99 °F (37.2 °C) 87 14 149/81 100 %       Pulse Oximetry Analysis - 100% on RA    Cardiac Monitor:   Rate: 87bpm  Rhythm: Normal Sinus Rhythm      Records Reviewed: Nursing Notes, Old Medical Records, Previous Radiology Studies, Previous Laboratory Studies and Management plan and [facility] chart    Provider Notes (Medical Decision Making):   MDM: Middle-aged female presenting with complaint of sickle cell crisis. She is afebrile with normal saturation and a normal exam with low suspicion for acute chest, severe anemia or bacteremia. Patient is requesting Dilaudid with IV Benadryl but explained again and to her (as we have discussed this on previous visits) that we do not use Dilaudid in this emergency room any longer. Given her management plan will initiate treatment with nonnarcotic attempts at pain relief. ED Course:   Initial assessment performed. The patients presenting problems have been discussed, and they are in agreement with the care plan formulated and outlined with them. I have encouraged them to ask questions as they arise throughout their visit.     PROGRESS NOTE:  2:40 AM  Pt states she does not feel any better. His pain is the same. Will initiate dosing with that no which is worked for her in the past and discuss with internal medicine for admission. CONSULT NOTE:   2:51 AM  Clementina Lazar MD spoke with Dr. Garry Murrieta   Specialty: Hospitalist  Discussed pt's hx, disposition, and available diagnostic and imaging results. Reviewed care plans. Consultant agrees with plans as outlined. He will evaluate the patient for admission. Critical Care Time:   0      Diagnosis     Clinical Impression:   1. Other chronic pain    2. Sickle cell anemia with pain [facility])        PLAN:  Admission for pain control      Please note, this dictation was completed with [employer], the computer voice recognition software. Quite often unanticipated grammatical, syntax, homophones, and other interpretive errors are inadvertently transcribed by the computer software. Please disregard these errors. Please excuse any errors that have escaped final proof reading.

## 2019-09-06 NOTE — PROGRESS NOTES
* No surgery found *  * No surgery found *  Bedside and Verbal shift change report given to [facility] (oncoming nurse) by Jennifer Coronado R.N (offgoing nurse). Report included the following information SBAR, Kardex and MAR. Zone Phone:   [phone]      Significant changes during shift:  NONE        Patient Information    Ab Owens  66 y.o.  9/6/2019  1:10 AM by Cheri Ureña MD. Ab Owens was admitted from Home    Problem List    Patient Active Problem List    Diagnosis Date Noted    Class 1 obesity due to excess calories with serious comorbidity and body mass index (BMI) of 30.0 to 30.9 in adult 08/13/2019    Sickle cell disease ([facility] 02/10/2019    Overweight (BMI 25.0-29.9) 02/07/2019    Sickle cell pain crisis ([facility] [facility] 09/08/2018    Carpal tunnel syndrome of right wrist 06/12/2018    Pulsatile tinnitus 10/31/2017    Sickle cell crisis ([facility] [address] [facility] 05/22/2017    Depression 10/05/2014    Hypertension 10/03/2014    Venous insufficiency 10/03/2014     Past Medical History:   Diagnosis Date    Anemia     SC hemoglobinopathy    Chronic pain     Depression     Hypertension     Liver disease     hepatitis C; pt reports \"cured\"    Sickle cell disease ([facility] [address] [facility]          Core Measures:    CVA: No No  CHF:No No  PNA:No No    Post Op Surgical (If Applicable):     Number times ambulated in hallway past shift:  NO  Number of times OOB to chair past shift:   NO  NG Tube: No  Incentive Spirometer: No  Drains: No   Volume    Dressing Present:  No  Flatus:  Not applicable    Activity Status:    OOB to Chair Yes  Ambulated this shift Yes   Bed Rest No    Supplemental O2: (If Applicable)    NC No  NRB No  Venti-mask No  On  Liters/min      LINES AND DRAINS:    Central Line? No     PICC LINE? No     Urinary Catheter?  No     DVT prophylaxis:    DVT prophylaxis Med- Yes  DVT prophylaxis SCD or SARA- No     Wounds: (If Applicable)    Wounds- No    Location     Patient Safety:    Falls Score Total Score: 1  Safety Level_______  Bed Alarm On? No  Sitter?  No    Plan for upcoming shift: monitor for pain        Discharge Plan: No     Active Consults:  IP CONSULT TO PRIMARY CARE PROVIDER

## 2019-09-06 NOTE — ED NOTES
TRANSFER - OUT REPORT:    Verbal report given to Renae DAWSON(name) on Manuel Li  being transferred to Neuro Unit(unit) for routine progression of care       Report consisted of patients Situation, Background, Assessment and   Recommendations(SBAR). Information from the following report(s) SBAR, Kardex, ED Summary, Procedure Summary, Intake/Output, Recent Results and Med Rec Status was reviewed with the receiving nurse. Lines:   Peripheral IV 09/06/19 Left Antecubital (Active)   Site Assessment Clean, dry, & intact 9/6/2019  3:35 AM   Phlebitis Assessment 0 9/6/2019  3:35 AM   Infiltration Assessment 0 9/6/2019  3:35 AM   Dressing Status Clean, dry, & intact 9/6/2019  3:35 AM   Hub Color/Line Status Pink 9/6/2019  3:35 AM   Action Taken Dressing reinforced 9/6/2019  3:35 AM        Opportunity for questions and clarification was provided.       Patient transported with:   [employer]

## 2019-09-06 NOTE — PROGRESS NOTES
General Daily Progress Note    Admit Date: 9/6/2019    Subjective:     Patient c/o pain. Current Facility-Administered Medications   Medication Dose Route Frequency    sodium chloride (NS) flush 5-40 mL  5-40 mL IntraVENous Q8H    sodium chloride (NS) flush 5-40 mL  5-40 mL IntraVENous PRN    acetaminophen (TYLENOL) tablet 650 mg  650 mg Oral Q4H PRN    ondansetron (ZOFRAN) injection 4 mg  4 mg IntraVENous Q4H PRN    ketorolac (TORADOL) injection 30 mg  30 mg IntraVENous Q6H PRN    diphenhydrAMINE (BENADRYL) injection 12.5 mg  12.5 mg IntraVENous Q4H PRN    HYDROmorphone (PF) (DILAUDID) injection 1 mg  1 mg IntraVENous Q3H PRN    enoxaparin (LOVENOX) injection 40 mg  40 mg SubCUTAneous Q24H    dextrose 5 % - 0.45% NaCl 1,000 mL with potassium chloride 20 mEq infusion   IntraVENous CONTINUOUS        Review of Systems  A comprehensive review of systems was negative. Objective:     Patient Vitals for the past 24 hrs:   BP Temp Pulse Resp SpO2 Height Weight   09/06/19 1528 132/67 98.9 °F (37.2 °C) 84 16 98 %     09/06/19 1131 157/85 98.5 °F (36.9 °C) 79 18 97 %     09/06/19 0749 141/85 98.3 °F (36.8 °C) 78 18 99 %     09/06/19 0459 154/82 98.3 °F (36.8 °C) 75 20 100 %     09/06/19 0230 143/84  81 23 97 %     09/06/19 0217   83 16 99 %     09/06/19 0216 (!) 152/92         09/06/19 0109 149/81 99 °F (37.2 °C) 87 14 100 % 5' 10\" (1.778 m) 202 lb 13.2 oz (92 kg)     09/06 0701 - 09/06 1900  In: 720 [P.O.:720]  Out: -   No intake/output data recorded. Physical Exam:   Visit Vitals  /67   Pulse 84   Temp 98.9 °F (37.2 °C)   Resp 16   Ht 5' 10\" (1.778 m)   Wt 202 lb 13.2 oz (92 kg)   SpO2 98%   Breastfeeding?  No   BMI 29.10 kg/m²     General appearance: alert, cooperative, no distress, appears stated age  Neck: supple, symmetrical, trachea midline, no adenopathy, thyroid: not enlarged, symmetric, no tenderness/mass/nodules, no carotid bruit and no JVD  Lungs: clear to auscultation bilaterally  Heart: regular rate and rhythm, S1, S2 normal, no murmur, click, rub or gallop  Abdomen: soft, non-tender. Bowel sounds normal. No masses,  no organomegaly  Extremities: extremities normal, atraumatic, no cyanosis or edema        Data Review   Recent Results (from the past 24 hour(s))   CBC WITH AUTOMATED DIFF    Collection Time: 09/06/19  1:47 AM   Result Value Ref Range    WBC 8.9 3.6 - 11.0 K/uL    RBC 2.75 (L) 3.80 - 5.20 M/uL    HGB 8.8 (L) 11.5 - 16.0 g/dL    HCT 25.6 (L) 35.0 - 47.0 %    MCV 93.1 80.0 - 99.0 FL    MCH 32.0 26.0 - 34.0 PG    MCHC 34.4 30.0 - 36.5 g/dL    RDW 13.8 11.5 - 14.5 %    PLATELET 554 990 - 783 K/uL    MPV 12.0 8.9 - 12.9 FL    NRBC 0.3 (H) 0  WBC    ABSOLUTE NRBC 0.03 (H) 0.00 - 0.01 K/uL    NEUTROPHILS 33 32 - 75 %    LYMPHOCYTES 52 (H) 12 - 49 %    MONOCYTES 10 5 - 13 %    EOSINOPHILS 4 0 - 7 %    BASOPHILS 1 0 - 1 %    IMMATURE GRANULOCYTES 0 0.0 - 0.5 %    ABS. NEUTROPHILS 2.9 1.8 - 8.0 K/UL    ABS. LYMPHOCYTES 4.7 (H) 0.8 - 3.5 K/UL    ABS. MONOCYTES 0.9 0.0 - 1.0 K/UL    ABS. EOSINOPHILS 0.3 0.0 - 0.4 K/UL    ABS. BASOPHILS 0.1 0.0 - 0.1 K/UL    ABS. IMM. GRANS. 0.0 0.00 - 0.04 K/UL    DF AUTOMATED     METABOLIC PANEL, COMPREHENSIVE    Collection Time: 09/06/19  1:47 AM   Result Value Ref Range    Sodium 139 136 - 145 mmol/L    Potassium 3.9 3.5 - 5.1 mmol/L    Chloride 108 97 - 108 mmol/L    CO2 27 21 - 32 mmol/L    Anion gap 4 (L) 5 - 15 mmol/L    Glucose 95 65 - 100 mg/dL    BUN 8 6 - 20 MG/DL    Creatinine 0.71 0.55 - 1.02 MG/DL    BUN/Creatinine ratio 11 (L) 12 - 20      GFR est AA >60 >60 ml/min/1.73m2    GFR est non-AA >60 >60 ml/min/1.73m2    Calcium 8.5 8.5 - 10.1 MG/DL    Bilirubin, total 1.6 (H) 0.2 - 1.0 MG/DL    ALT (SGPT) 21 12 - 78 U/L    AST (SGOT) 37 15 - 37 U/L    Alk.  phosphatase 120 (H) 45 - 117 U/L    Protein, total 8.4 (H) 6.4 - 8.2 g/dL    Albumin 3.9 3.5 - 5.0 g/dL    Globulin 4.5 (H) 2.0 - 4.0 g/dL    A-G Ratio 0.9 (L) 1.1 - 2.2 RETICULOCYTE COUNT    Collection Time: 09/06/19  1:47 AM   Result Value Ref Range    Reticulocyte count 4.5 (H) 0.7 - 2.1 %    Absolute Retic Cnt. 0.1191 (H) 0.0164 - 0.0776 M/ul   LD    Collection Time: 09/06/19  1:47 AM   Result Value Ref Range     (H) 81 - 246 U/L           Assessment:     Active Problems:    Sickle cell pain crisis ([facility] [address] (9/8/2018)        Plan: 1. Cont treatment of painful crisis. 2.Discussed hydroxyurea with pt again. 3.Will mobilize.

## 2019-09-07 LAB
ALBUMIN SERPL-MCNC: 3.5 G/DL (ref 3.5–5)
ALBUMIN/GLOB SERPL: 0.8 {RATIO} (ref 1.1–2.2)
ALP SERPL-CCNC: 101 U/L (ref 45–117)
ALT SERPL-CCNC: 22 U/L (ref 12–78)
ANION GAP SERPL CALC-SCNC: 5 MMOL/L (ref 5–15)
AST SERPL-CCNC: 39 U/L (ref 15–37)
BASOPHILS # BLD: 0 K/UL (ref 0–0.1)
BASOPHILS NFR BLD: 0 % (ref 0–1)
BILIRUB SERPL-MCNC: 1.7 MG/DL (ref 0.2–1)
BLASTS NFR BLD MANUAL: 0 %
BUN SERPL-MCNC: 7 MG/DL (ref 6–20)
BUN/CREAT SERPL: 8 (ref 12–20)
CALCIUM SERPL-MCNC: 8.2 MG/DL (ref 8.5–10.1)
CHLORIDE SERPL-SCNC: 107 MMOL/L (ref 97–108)
CO2 SERPL-SCNC: 28 MMOL/L (ref 21–32)
CREAT SERPL-MCNC: 0.85 MG/DL (ref 0.55–1.02)
DIFFERENTIAL METHOD BLD: ABNORMAL
EOSINOPHIL # BLD: 0.2 K/UL (ref 0–0.4)
EOSINOPHIL NFR BLD: 2 % (ref 0–7)
ERYTHROCYTE [DISTWIDTH] IN BLOOD BY AUTOMATED COUNT: 13.9 % (ref 11.5–14.5)
GLOBULIN SER CALC-MCNC: 4.2 G/DL (ref 2–4)
GLUCOSE SERPL-MCNC: 72 MG/DL (ref 65–100)
HCT VFR BLD AUTO: 22.2 % (ref 35–47)
HGB BLD-MCNC: 7.8 G/DL (ref 11.5–16)
IMM GRANULOCYTES # BLD AUTO: 0 K/UL
IMM GRANULOCYTES NFR BLD AUTO: 0 %
LYMPHOCYTES # BLD: 4.1 K/UL (ref 0.8–3.5)
LYMPHOCYTES NFR BLD: 50 % (ref 12–49)
MCH RBC QN AUTO: 32.6 PG (ref 26–34)
MCHC RBC AUTO-ENTMCNC: 35.1 G/DL (ref 30–36.5)
MCV RBC AUTO: 92.9 FL (ref 80–99)
METAMYELOCYTES NFR BLD MANUAL: 0 %
MONOCYTES # BLD: 0.3 K/UL (ref 0–1)
MONOCYTES NFR BLD: 3 % (ref 5–13)
MYELOCYTES NFR BLD MANUAL: 0 %
NEUTS BAND NFR BLD MANUAL: 1 % (ref 0–6)
NEUTS SEG # BLD: 3.8 K/UL (ref 1.8–8)
NEUTS SEG NFR BLD: 44 % (ref 32–75)
NRBC # BLD: 0.06 K/UL (ref 0–0.01)
NRBC BLD-RTO: 0.7 PER 100 WBC
OTHER CELLS NFR BLD MANUAL: 0 %
PLATELET # BLD AUTO: 170 K/UL (ref 150–400)
PMV BLD AUTO: 12 FL (ref 8.9–12.9)
POTASSIUM SERPL-SCNC: 4.4 MMOL/L (ref 3.5–5.1)
PROMYELOCYTES NFR BLD MANUAL: 0 %
PROT SERPL-MCNC: 7.7 G/DL (ref 6.4–8.2)
RBC # BLD AUTO: 2.39 M/UL (ref 3.8–5.2)
RBC MORPH BLD: ABNORMAL
SODIUM SERPL-SCNC: 140 MMOL/L (ref 136–145)
WBC # BLD AUTO: 8.4 K/UL (ref 3.6–11)

## 2019-09-07 PROCEDURE — 74011250636 HC RX REV CODE- 250/636: Performed by: INTERNAL MEDICINE

## 2019-09-07 PROCEDURE — 85027 COMPLETE CBC AUTOMATED: CPT

## 2019-09-07 PROCEDURE — 74011250637 HC RX REV CODE- 250/637: Performed by: INTERNAL MEDICINE

## 2019-09-07 PROCEDURE — 36415 COLL VENOUS BLD VENIPUNCTURE: CPT

## 2019-09-07 PROCEDURE — 80053 COMPREHEN METABOLIC PANEL: CPT

## 2019-09-07 PROCEDURE — 76937 US GUIDE VASCULAR ACCESS: CPT

## 2019-09-07 PROCEDURE — 77010033678 HC OXYGEN DAILY

## 2019-09-07 PROCEDURE — 94760 N-INVAS EAR/PLS OXIMETRY 1: CPT

## 2019-09-07 PROCEDURE — 65270000029 HC RM PRIVATE

## 2019-09-07 PROCEDURE — C1751 CATH, INF, PER/CENT/MIDLINE: HCPCS

## 2019-09-07 PROCEDURE — 77030018786 HC NDL GD F/USND BARD -B

## 2019-09-07 PROCEDURE — 74011250636 HC RX REV CODE- 250/636: Performed by: HOSPITALIST

## 2019-09-07 PROCEDURE — 94762 N-INVAS EAR/PLS OXIMTRY CONT: CPT

## 2019-09-07 RX ORDER — ADHESIVE BANDAGE
30 BANDAGE TOPICAL DAILY PRN
Status: DISCONTINUED | OUTPATIENT
Start: 2019-09-07 | End: 2019-09-20 | Stop reason: HOSPADM

## 2019-09-07 RX ORDER — HYDROMORPHONE HYDROCHLORIDE 2 MG/1
2 TABLET ORAL
Status: DISCONTINUED | OUTPATIENT
Start: 2019-09-07 | End: 2019-09-13

## 2019-09-07 RX ORDER — HYDROMORPHONE HYDROCHLORIDE 2 MG/1
2 TABLET ORAL ONCE
Status: COMPLETED | OUTPATIENT
Start: 2019-09-07 | End: 2019-09-07

## 2019-09-07 RX ORDER — ONDANSETRON 4 MG/1
4 TABLET, ORALLY DISINTEGRATING ORAL
Status: DISCONTINUED | OUTPATIENT
Start: 2019-09-07 | End: 2019-09-20 | Stop reason: HOSPADM

## 2019-09-07 RX ORDER — DIPHENHYDRAMINE HCL 25 MG
25 CAPSULE ORAL
Status: DISCONTINUED | OUTPATIENT
Start: 2019-09-07 | End: 2019-09-13

## 2019-09-07 RX ORDER — POLYETHYLENE GLYCOL 3350 17 G/17G
17 POWDER, FOR SOLUTION ORAL 2 TIMES DAILY
Status: DISCONTINUED | OUTPATIENT
Start: 2019-09-07 | End: 2019-09-20 | Stop reason: HOSPADM

## 2019-09-07 RX ADMIN — DIPHENHYDRAMINE HYDROCHLORIDE 12.5 MG: 50 INJECTION, SOLUTION INTRAMUSCULAR; INTRAVENOUS at 17:25

## 2019-09-07 RX ADMIN — DIPHENHYDRAMINE HYDROCHLORIDE 12.5 MG: 50 INJECTION, SOLUTION INTRAMUSCULAR; INTRAVENOUS at 22:26

## 2019-09-07 RX ADMIN — ENOXAPARIN SODIUM 40 MG: 40 INJECTION, SOLUTION INTRAVENOUS; SUBCUTANEOUS at 17:23

## 2019-09-07 RX ADMIN — KETOROLAC TROMETHAMINE 30 MG: 30 INJECTION, SOLUTION INTRAMUSCULAR at 12:52

## 2019-09-07 RX ADMIN — POLYETHYLENE GLYCOL 3350 17 G: 17 POWDER, FOR SOLUTION ORAL at 06:00

## 2019-09-07 RX ADMIN — DIPHENHYDRAMINE HYDROCHLORIDE 12.5 MG: 50 INJECTION, SOLUTION INTRAMUSCULAR; INTRAVENOUS at 12:53

## 2019-09-07 RX ADMIN — ONDANSETRON 4 MG: 2 INJECTION INTRAMUSCULAR; INTRAVENOUS at 22:26

## 2019-09-07 RX ADMIN — HYDROMORPHONE HYDROCHLORIDE 1 MG: 2 INJECTION, SOLUTION INTRAMUSCULAR; INTRAVENOUS; SUBCUTANEOUS at 22:26

## 2019-09-07 RX ADMIN — HYDROMORPHONE HYDROCHLORIDE 1 MG: 2 INJECTION, SOLUTION INTRAMUSCULAR; INTRAVENOUS; SUBCUTANEOUS at 18:43

## 2019-09-07 RX ADMIN — DEXTROSE MONOHYDRATE, SODIUM CHLORIDE, AND POTASSIUM CHLORIDE 100 ML/HR: 50; 4.5; 1.49 INJECTION, SOLUTION INTRAVENOUS at 13:09

## 2019-09-07 RX ADMIN — ONDANSETRON 4 MG: 2 INJECTION INTRAMUSCULAR; INTRAVENOUS at 17:24

## 2019-09-07 RX ADMIN — POLYETHYLENE GLYCOL 3350 17 G: 17 POWDER, FOR SOLUTION ORAL at 09:48

## 2019-09-07 RX ADMIN — HYDROMORPHONE HYDROCHLORIDE 1 MG: 2 INJECTION, SOLUTION INTRAMUSCULAR; INTRAVENOUS; SUBCUTANEOUS at 02:47

## 2019-09-07 RX ADMIN — Medication 10 ML: at 14:00

## 2019-09-07 RX ADMIN — HYDROMORPHONE HYDROCHLORIDE 2 MG: 2 TABLET ORAL at 07:17

## 2019-09-07 RX ADMIN — Medication 5 ML: at 22:26

## 2019-09-07 RX ADMIN — HYDROMORPHONE HYDROCHLORIDE 2 MG: 2 TABLET ORAL at 10:21

## 2019-09-07 RX ADMIN — HYDROMORPHONE HYDROCHLORIDE 1 MG: 2 INJECTION, SOLUTION INTRAMUSCULAR; INTRAVENOUS; SUBCUTANEOUS at 15:00

## 2019-09-07 RX ADMIN — ONDANSETRON 4 MG: 2 INJECTION INTRAMUSCULAR; INTRAVENOUS at 12:56

## 2019-09-07 NOTE — PROGRESS NOTES
Hospital Progress Note    NAME:  Magalys eDvi   :   1960   MRN:  [id number]     Date/Time:  2019 8:15 AM    Plan:   1. Iv access  2. Analgesics  3. Encourage fluids  Risk of Deterioration: Low  []           Moderate  [x]           High  []                 Assessment:   Principal Problem:    Sickle cell pain crisis ([address] (2018)     No iv access currently    Active Problems:    Hypertension (10/3/2014)      Venous insufficiency (10/3/2014)      Depression (10/5/2014)         Admitting notes:58 y.o lady w/ sickle cell disease, depression, HTN, HCV, who presents with bilateral shoulder, elbow, and hip pain. Symptoms began 3 days ago and have been progressive. She took her hydrocodone without relief. She denies fever, chest pain, or dyspnea.  She states the pain is typical of her pain crises      Subjective:     Continues with shoulder, elbow,pelvic pain - iv access pending  11 Point Review of Systems:   Negative except no cp/sob    []            Unable to obtain ROS due to:       []            mental status change []            sedated []            intubated     Social History     Tobacco Use    Smoking status: Never Smoker    Smokeless tobacco: Never Used   Substance Use Topics    Alcohol use: No     Medications reviewed:  Current Facility-Administered Medications   Medication Dose Route Frequency    HYDROmorphone (DILAUDID) tablet 2 mg  2 mg Oral Q3H PRN    diphenhydrAMINE (BENADRYL) capsule 25 mg  25 mg Oral Q4H PRN    ondansetron (ZOFRAN ODT) tablet 4 mg  4 mg Oral Q6H PRN    polyethylene glycol (MIRALAX) packet 17 g  17 g Oral BID    magnesium hydroxide (MILK OF MAGNESIA) 400 mg/5 mL oral suspension 30 mL  30 mL Oral DAILY PRN    sodium chloride (NS) flush 5-40 mL  5-40 mL IntraVENous Q8H    sodium chloride (NS) flush 5-40 mL  5-40 mL IntraVENous PRN    acetaminophen (TYLENOL) tablet 650 mg  650 mg Oral Q4H PRN    ondansetron (ZOFRAN) injection 4 mg  4 mg IntraVENous Q4H PRN    ketorolac (TORADOL) injection 30 mg  30 mg IntraVENous Q6H PRN    diphenhydrAMINE (BENADRYL) injection 12.5 mg  12.5 mg IntraVENous Q4H PRN    HYDROmorphone (PF) (DILAUDID) injection 1 mg  1 mg IntraVENous Q3H PRN    enoxaparin (LOVENOX) injection 40 mg  40 mg SubCUTAneous Q24H    dextrose 5% - 0.45% NaCl with KCl 20 mEq/L infusion  100 mL/hr IntraVENous CONTINUOUS        Objective:   Vitals:  Visit Vitals  /64 (BP 1 Location: Right arm, BP Patient Position: At rest)   Pulse 74   Temp 98.4 °F (36.9 °C)   Resp 16   Ht 5' 10\" (1.778 m)   Wt 202 lb 13.2 oz (92 kg)   SpO2 99%   Breastfeeding? No   BMI 29.10 kg/m²     Temp (24hrs), Av.4 °F (36.9 °C), Min:97.6 °F (36.4 °C), Max:98.9 °F (37.2 °C)    O2 Flow Rate (L/min): 1.5 l/min O2 Device: Nasal cannula    Last 24hr Input/Output:    Intake/Output Summary (Last 24 hours) at 2019 0819  Last data filed at 2019 0500  Gross per 24 hour   Intake 2288.33 ml   Output    Net 2288.33 ml        PHYSICAL EXAM:  General:    Alert, cooperative, no distress, appears stated age. Head:   Normocephalic, without obvious abnormality, atraumatic. Eyes:   Conjunctivae/corneas clear. PERRLA  Nose:  Nares normal. No drainage or sinus tenderness. Throat:    Lips, mucosa, and tongue normal.  No Thrush  Neck:  Supple, symmetrical,  no adenopathy, thyroid: non tender    no carotid bruit and no JVD. Back:    Symmetric,  No CVA tenderness. Lungs:   Clear to auscultation bilaterally. No Wheezing or Rhonchi. No rales. Chest wall:  No tenderness or deformity. No Accessory muscle use. Heart:   Regular rate and rhythm,  no murmur, rub or gallop. Abdomen:   Soft, non-tender. Not distended.   Bowel sounds normal. No masses        Lab Data Reviewed:    Recent Labs     19  0147   WBC 8.9   HGB 8.8*   HCT 25.6*        Recent Labs     19  0147      K 3.9      CO2 27   GLU 95   BUN 8   CREA 0.71   CA 8.5   ALB 3.9   TBILI 1.6*   SGOT 37   ALT 21 Lab Results   Component Value Date/Time    Glucose (POC) 110 (H) 05/30/2019 08:04 AM    Glucose (POC) 116 (H) 02/12/2019 04:43 PM    Glucose (POC) 126 (H) 02/12/2019 11:44 AM    Glucose (POC) 109 (H) 02/12/2019 07:37 AM    Glucose (POC) 101 (H) 02/11/2019 04:18 PM     No results for input(s): PH, PCO2, PO2, HCO3, FIO2 in the last 72 hours. No results for input(s): INR in the last 72 hours.     No lab exists for component: Jose Malady  ___________________________________________________  ___________________________________________________    Attending Physician: Marbella Garrido MD

## 2019-09-07 NOTE — PROGRESS NOTES
Problem: Falls - Risk of  Goal: *Absence of Falls  Description  Document Rey Donald Fall Risk and appropriate interventions in the flowsheet.   Outcome: Progressing Towards Goal  Note:   Fall Risk Interventions:            Medication Interventions: Teach patient to arise slowly                   Problem: Patient Education: Go to Patient Education Activity  Goal: Patient/Family Education  Outcome: Progressing Towards Goal     Problem: Pain  Goal: *Control of Pain  Outcome: Progressing Towards Goal  Goal: *PALLIATIVE CARE:  Alleviation of Pain  Outcome: Progressing Towards Goal     Problem: Patient Education: Go to Patient Education Activity  Goal: Patient/Family Education  Outcome: Progressing Towards Goal     Problem: Constipation - Risk of  Goal: *Prevention of constipation  Outcome: Progressing Towards Goal  Goal: *PALLIATIVE CARE:  Prevention/Alleviation of constipation  Outcome: Progressing Towards Goal     Problem: Patient Education: Go to Patient Education Activity  Goal: Patient/Family Education  Outcome: Progressing Towards Goal

## 2019-09-07 NOTE — PROGRESS NOTES
Hospital Progress Note    NAME:  Ana Rosa Courtney   :   1960   MRN:  [id number]     Date/Time:  2019 8:15 AM    Plan:   1. Trial po meds - pt agrees  2. Analgesics  3. Encourage fluids  Risk of Deterioration: Low  []           Moderate  [x]           High  []                 Assessment:   Principal Problem:    Sickle cell pain crisis ([address] (2018)     No iv access currently    Active Problems:    Hypertension (10/3/2014)      Venous insufficiency (10/3/2014)      Depression (10/5/2014)         Admitting notes:58 y.o lady w/ sickle cell disease, depression, HTN, HCV, who presents with bilateral shoulder, elbow, and hip pain. Symptoms began 3 days ago and have been progressive. She took her hydrocodone without relief. She denies fever, chest pain, or dyspnea.  She states the pain is typical of her pain crises      Subjective:     Continues with shoulder, elbow,pelvic pain - wants to try po meds d/t iv access  11 Point Review of Systems:   Negative except no cp/sob    []            Unable to obtain ROS due to:       []            mental status change []            sedated []            intubated     Social History     Tobacco Use    Smoking status: Never Smoker    Smokeless tobacco: Never Used   Substance Use Topics    Alcohol use: No     Medications reviewed:  Current Facility-Administered Medications   Medication Dose Route Frequency    HYDROmorphone (DILAUDID) tablet 2 mg  2 mg Oral Q3H PRN    diphenhydrAMINE (BENADRYL) capsule 25 mg  25 mg Oral Q4H PRN    ondansetron (ZOFRAN ODT) tablet 4 mg  4 mg Oral Q6H PRN    polyethylene glycol (MIRALAX) packet 17 g  17 g Oral BID    magnesium hydroxide (MILK OF MAGNESIA) 400 mg/5 mL oral suspension 30 mL  30 mL Oral DAILY PRN    sodium chloride (NS) flush 5-40 mL  5-40 mL IntraVENous Q8H    sodium chloride (NS) flush 5-40 mL  5-40 mL IntraVENous PRN    acetaminophen (TYLENOL) tablet 650 mg  650 mg Oral Q4H PRN    ondansetron (ZOFRAN) injection 4 mg  4 mg IntraVENous Q4H PRN    ketorolac (TORADOL) injection 30 mg  30 mg IntraVENous Q6H PRN    diphenhydrAMINE (BENADRYL) injection 12.5 mg  12.5 mg IntraVENous Q4H PRN    HYDROmorphone (PF) (DILAUDID) injection 1 mg  1 mg IntraVENous Q3H PRN    enoxaparin (LOVENOX) injection 40 mg  40 mg SubCUTAneous Q24H    dextrose 5% - 0.45% NaCl with KCl 20 mEq/L infusion  100 mL/hr IntraVENous CONTINUOUS        Objective:   Vitals:  Visit Vitals  /64 (BP 1 Location: Right arm, BP Patient Position: At rest)   Pulse 74   Temp 98.4 °F (36.9 °C)   Resp 16   Ht 5' 10\" (1.778 m)   Wt 202 lb 13.2 oz (92 kg)   SpO2 99%   Breastfeeding? No   BMI 29.10 kg/m²     Temp (24hrs), Av.4 °F (36.9 °C), Min:97.6 °F (36.4 °C), Max:98.9 °F (37.2 °C)    O2 Flow Rate (L/min): 1.5 l/min O2 Device: Nasal cannula    Last 24hr Input/Output:    Intake/Output Summary (Last 24 hours) at 2019 0815  Last data filed at 2019 0500  Gross per 24 hour   Intake 2288.33 ml   Output    Net 2288.33 ml        PHYSICAL EXAM:  General:    Alert, cooperative, no distress, appears stated age. Head:   Normocephalic, without obvious abnormality, atraumatic. Eyes:   Conjunctivae/corneas clear. PERRLA  Nose:  Nares normal. No drainage or sinus tenderness. Throat:    Lips, mucosa, and tongue normal.  No Thrush  Neck:  Supple, symmetrical,  no adenopathy, thyroid: non tender    no carotid bruit and no JVD. Back:    Symmetric,  No CVA tenderness. Lungs:   Clear to auscultation bilaterally. No Wheezing or Rhonchi. No rales. Chest wall:  No tenderness or deformity. No Accessory muscle use. Heart:   Regular rate and rhythm,  no murmur, rub or gallop. Abdomen:   Soft, non-tender. Not distended.   Bowel sounds normal. No masses        Lab Data Reviewed:    Recent Labs     19  0147   WBC 8.9   HGB 8.8*   HCT 25.6*        Recent Labs     19  0147      K 3.9      CO2 27   GLU 95   BUN 8   CREA 0.71   CA 8.5   ALB 3.9 TBILI 1.6*   SGOT 37   ALT 21     Lab Results   Component Value Date/Time    Glucose (POC) 110 (H) 05/30/2019 08:04 AM    Glucose (POC) 116 (H) 02/12/2019 04:43 PM    Glucose (POC) 126 (H) 02/12/2019 11:44 AM    Glucose (POC) 109 (H) 02/12/2019 07:37 AM    Glucose (POC) 101 (H) 02/11/2019 04:18 PM     No results for input(s): PH, PCO2, PO2, HCO3, FIO2 in the last 72 hours. No results for input(s): INR in the last 72 hours.     No lab exists for component: INREXT  ___________________________________________________  ___________________________________________________    Attending Physician: Bela Valerio MD

## 2019-09-07 NOTE — PROGRESS NOTES
* No surgery found *  * No surgery found *  Bedside and Verbal shift change report given to [facility] (oncoming nurse) by Jt Clayton (offgoing nurse). Report included the following information SBAR, Kardex and MAR. Zone Phone:   [phone]      Significant changes during shift:  Patient got a midline, fluids restarted, IV dilaudid, benadryl and Zofran given around the clock    Patient Information    Kleber Squires  66 y.o.  9/6/2019  1:10 AM by Noemí Dupree MD. Kleber Squires was admitted from Home    Problem List    Patient Active Problem List    Diagnosis Date Noted    Class 1 obesity due to excess calories with serious comorbidity and body mass index (BMI) of 30.0 to 30.9 in adult 08/13/2019    Sickle cell disease ([facility] 02/10/2019    Overweight (BMI 25.0-29.9) 02/07/2019    Sickle cell pain crisis ([facility] 09/08/2018    Carpal tunnel syndrome of right wrist 06/12/2018    Pulsatile tinnitus 10/31/2017    Sickle cell crisis ([facility] [address] 05/22/2017    Depression 10/05/2014    Hypertension 10/03/2014    Venous insufficiency 10/03/2014     Past Medical History:   Diagnosis Date    Anemia     SC hemoglobinopathy    Chronic pain     Depression     Hypertension     Liver disease     hepatitis C; pt reports \"cured\"    Sickle cell disease ([facility] [address]      Core Measures:    CVA: No No  CHF:No No  PNA:No No    Activity Status:    OOB to Chair No  Ambulated this shift Yes   Bed Rest No    Supplemental O2: (If Applicable)    NC yes  NRB No  Venti-mask No  On 1.5 Liters/min    LINES AND DRAINS:    Midline    DVT prophylaxis:    DVT prophylaxis Med- Yes  DVT prophylaxis SCD or SARA- No     Wounds: (If Applicable)    Wounds- No    Location     Patient Safety:    Falls Score Total Score: 0  Safety Level_______  Bed Alarm On? No  Sitter?  No    Plan for upcoming shift: monitor for pain    Discharge Plan: No     Active Consults:  IP CONSULT TO PRIMARY CARE PROVIDER

## 2019-09-07 NOTE — PROGRESS NOTES
Reason for Admission:  Sickle Cell Crisis                    RRAT Score:  19                Do you (patient/family) have any concerns for transition/discharge? The patient resides in a 1 level home with 3-4 steps to enter by herself. She has 1 daughter that lives locally and 1 son that lives in [place]. She has 1 brother that she is very close with that lives locally. She reports her family is extremely supportive. Plan for utilizing home health: Unknown at this time but it is unlikely she will need [facility] services as she is completely independent in her ADLs and IADLs       Current Advanced Directive/Advance Care Plan:  The patient is a full code and she does not have a MPOA/AD on-file            Transition of Care Plan:    CM met with the patient at bedside to discuss dispo needs. The patient resides in a 1 level home with 3-4 steps to enter by herself. She has 1 daughter that lives locally and 1 son that lives in [place]. She has a brother who lives locally that is very supportive. She has good familial supports. The patient reports that she is independent in her ADLs and IADLs. She does not use any assistive device when ambulating. She uses the [facility] on [address] [place] [address] for her medications. She is still able to drive and her daughter will assist with d/c transportation. CM will continue to follow the patient for dispo planning. Care Management Interventions  PCP Verified by CM: Yes  Last Visit to PCP: 08/12/19  Mode of Transport at Discharge:  Other (see comment)(The patient's daughter will assist with d/c transportation )  Transition of Care Consult (CM Consult): Discharge Planning  MyChart Signup: No  Discharge Durable Medical Equipment: No  Physical Therapy Consult: No  Occupational Therapy Consult: No  Speech Therapy Consult: No  Current Support Network: Lives Alone  Confirm Follow Up Transport: Self  Plan discussed with Pt/Family/Caregiver: Yes  Freedom of Choice Offered: Yes  [employer] Information Provided?: No  Discharge Location  Discharge Placement: Home    Vandana Hernandez LCSW

## 2019-09-07 NOTE — PROGRESS NOTES
Midline insertion procedure note:  Pt has very limited vascular access. Procedure explained to patient along with risks and benefits. Procedure teaching completed. Pre procedure assessment done. Patient denies questions or concerns at this time. Midline information booklet patient. [place] [facility] Maximum sterile barrier precautions observed throughout procedure. Lidocaine 1% 3ml sc injected to site prior to access the vein. Cannulated right basilic vein using ultrasound guidance. Inserted 4 Fr. single lumen midline to right arm. Blood return verified and flushed with 20ml normal saline. Sterile dressing applied with Biopatch, StatLock and occlusive dressing as per protocol. Curos caps applied to port. Patient tolerated procedure well with minimal blood loss. Reason for access : Reliable IV access  Complications related to insertion : none  See nursing message on [facility] for midline reminders. Midline is CT and MRI compatible. Inserted by : Kervin Chatterjee RN [facility] / Vascular Access Nurse  Assisted by : Neftali Barrett RN, [facility] / Vascular Access Nurse  Catheter Length : 13 cm   External Length : 0 cm   Right arm circumference 10cm from [facility]: 32 cm  Catheter occupies 10% of vein. Type of Midline: BARD  Ref#: [id number]  Lot#: [id number]  Expiration Date: 2020-09-30  Informed primary nurse Arthur Petty RN midline is ready for use and to hang new infusion tubing prior to use.     Kervin Chatterjee RN [facility]   Vascular Access Nurse

## 2019-09-07 NOTE — PROGRESS NOTES
Spiritual Care Assessment/Progress Note  [facility]      NAME: Thu Kearney      MRN: [id number]  AGE: 66 y.o.  SEX: female  Jainism Affiliation: Non Oriental orthodox   Language: English     9/7/2019     Total Time (in minutes): 26     Spiritual Assessment begun in [facility] NEUROSCIENCE TELEMETRY through conversation with:         [x]Patient        [] Family    [] Friend(s)        Reason for Consult: Initial/Spiritual assessment, patient floor     Spiritual beliefs: (Please include comment if needed)     [x] Identifies with a zay tradition:         [x] Supported by a zay community:            [] Claims no spiritual orientation:           [] Seeking spiritual identity:                [] Adheres to an individual form of spirituality:           [] Not able to assess:                           Identified resources for coping:      [x] Prayer                               [] Music                  [] Guided Imagery     [x] Family/friends                 [] Pet visits     [] Devotional reading                         [] Unknown     [] Other:                                              Interventions offered during this visit: (See comments for more details)    Patient Interventions: Affirmation of zay, Affirmation of emotions/emotional suffering, Iconic (affirming the presence of God/Higher Power), Prayer (assurance of), Jainism beliefs/image of God discussed           Plan of Care:     [] Support spiritual and/or cultural needs    [] Support AMD and/or advance care planning process      [] Support grieving process   [] Coordinate Rites and/or Rituals    [] Coordination with community clergy   [x] No spiritual needs identified at this time   [] Detailed Plan of Care below (See Comments)  [] Make referral to Music Therapy  [] Make referral to Pet Therapy     [] Make referral to Addiction services  [] Make referral to [facility]  [] Make referral to Spiritual Care Partner  [] No future visits requested        [x] Follow up visits as needed     Comments: The visit with the patient in the Neuro/Tele unit was prompted by a follow-up request from a SCP. The spiritual assessment was made at that time as well. The patient was alone during the visit. The patient shared information about her Yarsanism affiliation and indicated that her Yarsanism is non-Mandaen and that it has branches in different states. The patient mentioned that her son and his wife attend the same Yarsanism in [place]. The patient appeared to have a spiritual nature that displayed her zay and beliefs. The patient shared information about her medical challenges and appeared to have her health issues in the proper perspective.  was able to provide a listening presence and supported her testimony about her theology.  offered spiritual support. The patient declined but expressed gratitude for the visit. Informed the patient of  availability. Chaplains will follow as able and/or needed. Rev.  Sean Hardin MDiv   For  Assistance Page [phone]

## 2019-09-08 PROCEDURE — 74011250636 HC RX REV CODE- 250/636: Performed by: HOSPITALIST

## 2019-09-08 PROCEDURE — 74011250636 HC RX REV CODE- 250/636: Performed by: INTERNAL MEDICINE

## 2019-09-08 PROCEDURE — 94760 N-INVAS EAR/PLS OXIMETRY 1: CPT

## 2019-09-08 PROCEDURE — 74011250637 HC RX REV CODE- 250/637: Performed by: INTERNAL MEDICINE

## 2019-09-08 PROCEDURE — 77010033678 HC OXYGEN DAILY

## 2019-09-08 PROCEDURE — 65270000029 HC RM PRIVATE

## 2019-09-08 RX ORDER — PANTOPRAZOLE SODIUM 40 MG/1
40 TABLET, DELAYED RELEASE ORAL
Status: DISCONTINUED | OUTPATIENT
Start: 2019-09-08 | End: 2019-09-20 | Stop reason: HOSPADM

## 2019-09-08 RX ADMIN — DEXTROSE MONOHYDRATE, SODIUM CHLORIDE, AND POTASSIUM CHLORIDE 100 ML/HR: 50; 4.5; 1.49 INJECTION, SOLUTION INTRAVENOUS at 23:35

## 2019-09-08 RX ADMIN — DEXTROSE MONOHYDRATE, SODIUM CHLORIDE, AND POTASSIUM CHLORIDE 100 ML/HR: 50; 4.5; 1.49 INJECTION, SOLUTION INTRAVENOUS at 00:37

## 2019-09-08 RX ADMIN — DIPHENHYDRAMINE HYDROCHLORIDE 12.5 MG: 50 INJECTION, SOLUTION INTRAMUSCULAR; INTRAVENOUS at 02:43

## 2019-09-08 RX ADMIN — ONDANSETRON 4 MG: 2 INJECTION INTRAMUSCULAR; INTRAVENOUS at 20:23

## 2019-09-08 RX ADMIN — HYDROMORPHONE HYDROCHLORIDE 1 MG: 2 INJECTION, SOLUTION INTRAMUSCULAR; INTRAVENOUS; SUBCUTANEOUS at 15:45

## 2019-09-08 RX ADMIN — ENOXAPARIN SODIUM 40 MG: 40 INJECTION, SOLUTION INTRAVENOUS; SUBCUTANEOUS at 17:58

## 2019-09-08 RX ADMIN — DIPHENHYDRAMINE HYDROCHLORIDE 12.5 MG: 50 INJECTION, SOLUTION INTRAMUSCULAR; INTRAVENOUS at 11:14

## 2019-09-08 RX ADMIN — ONDANSETRON 4 MG: 2 INJECTION INTRAMUSCULAR; INTRAVENOUS at 15:45

## 2019-09-08 RX ADMIN — DIPHENHYDRAMINE HYDROCHLORIDE 12.5 MG: 50 INJECTION, SOLUTION INTRAMUSCULAR; INTRAVENOUS at 15:44

## 2019-09-08 RX ADMIN — Medication 10 ML: at 06:32

## 2019-09-08 RX ADMIN — HYDROMORPHONE HYDROCHLORIDE 1 MG: 2 INJECTION, SOLUTION INTRAMUSCULAR; INTRAVENOUS; SUBCUTANEOUS at 11:12

## 2019-09-08 RX ADMIN — HYDROMORPHONE HYDROCHLORIDE 1 MG: 2 INJECTION, SOLUTION INTRAMUSCULAR; INTRAVENOUS; SUBCUTANEOUS at 21:46

## 2019-09-08 RX ADMIN — HYDROMORPHONE HYDROCHLORIDE 1 MG: 2 INJECTION, SOLUTION INTRAMUSCULAR; INTRAVENOUS; SUBCUTANEOUS at 18:55

## 2019-09-08 RX ADMIN — Medication 10 ML: at 15:48

## 2019-09-08 RX ADMIN — HYDROMORPHONE HYDROCHLORIDE 1 MG: 2 INJECTION, SOLUTION INTRAMUSCULAR; INTRAVENOUS; SUBCUTANEOUS at 02:43

## 2019-09-08 RX ADMIN — KETOROLAC TROMETHAMINE 30 MG: 30 INJECTION, SOLUTION INTRAMUSCULAR at 08:21

## 2019-09-08 RX ADMIN — ONDANSETRON 4 MG: 2 INJECTION INTRAMUSCULAR; INTRAVENOUS at 06:32

## 2019-09-08 RX ADMIN — ONDANSETRON 4 MG: 2 INJECTION INTRAMUSCULAR; INTRAVENOUS at 02:43

## 2019-09-08 RX ADMIN — PANTOPRAZOLE SODIUM 40 MG: 40 TABLET, DELAYED RELEASE ORAL at 08:17

## 2019-09-08 RX ADMIN — KETOROLAC TROMETHAMINE 30 MG: 30 INJECTION, SOLUTION INTRAMUSCULAR at 17:58

## 2019-09-08 RX ADMIN — Medication 10 ML: at 20:26

## 2019-09-08 RX ADMIN — HYDROMORPHONE HYDROCHLORIDE 1 MG: 2 INJECTION, SOLUTION INTRAMUSCULAR; INTRAVENOUS; SUBCUTANEOUS at 06:32

## 2019-09-08 RX ADMIN — DEXTROSE MONOHYDRATE, SODIUM CHLORIDE, AND POTASSIUM CHLORIDE 100 ML/HR: 50; 4.5; 1.49 INJECTION, SOLUTION INTRAVENOUS at 11:27

## 2019-09-08 RX ADMIN — POLYETHYLENE GLYCOL 3350 17 G: 17 POWDER, FOR SOLUTION ORAL at 17:58

## 2019-09-08 RX ADMIN — ONDANSETRON 4 MG: 2 INJECTION INTRAMUSCULAR; INTRAVENOUS at 11:13

## 2019-09-08 RX ADMIN — POLYETHYLENE GLYCOL 3350 17 G: 17 POWDER, FOR SOLUTION ORAL at 08:17

## 2019-09-08 RX ADMIN — DIPHENHYDRAMINE HYDROCHLORIDE 12.5 MG: 50 INJECTION, SOLUTION INTRAMUSCULAR; INTRAVENOUS at 06:32

## 2019-09-08 RX ADMIN — DIPHENHYDRAMINE HYDROCHLORIDE 25 MG: 25 CAPSULE ORAL at 20:23

## 2019-09-08 NOTE — PROGRESS NOTES
TRANSFER - OUT REPORT:    Verbal report given to [facility] SYS WASECA RN (name) on Eric Burton  being transferred to General Surgery (unit) for routine progression of care       Report consisted of patients Situation, Background, Assessment and   Recommendations(SBAR). Information from the following report(s) SBAR, Kardex, ED Summary, Intake/Output, MAR and Recent Results was reviewed with the receiving nurse. Lines:       Opportunity for questions and clarification was provided.       Patient transported with:   Monitor  Registered Nurse

## 2019-09-08 NOTE — PROGRESS NOTES
TRANSFER - IN REPORT:    Verbal report received from Vicente Oppenheim (name) on Alane Gosselin  being received from Neuro (unit). Report consisted of patients Situation, Background, Assessment and   Recommendations(SBAR). Information from the following report(s) SBAR, Kardex, Intake/Output and MAR was reviewed with the receiving nurse. Opportunity for questions and clarification was provided. Assessment completed upon patients arrival to unit and care assumed.

## 2019-09-08 NOTE — PROGRESS NOTES
Hospital Progress Note    NAME:  Ana Rosa Courtney   :   1960   MRN:  [id number]     Date/Time:  2019 8:15 AM    Plan:   1. Encourage activity  2. Analgesics  3. Encourage fluids  Risk of Deterioration: Low  []           Moderate  [x]           High  []                 Assessment:   Principal Problem:    Sickle cell pain crisis ([address] (2018)     No iv access currently    Active Problems:    Hypertension (10/3/2014)      Venous insufficiency (10/3/2014)      Depression (10/5/2014)         Admitting notes:58 y.o lady w/ sickle cell disease, depression, HTN, HCV, who presents with bilateral shoulder, elbow, and hip pain. Symptoms began 3 days ago and have been progressive. She took her hydrocodone without relief. She denies fever, chest pain, or dyspnea.  She states the pain is typical of her pain crises      Subjective:     Continues with shoulder, elbow,pelvic pain - iv access pending  11 Point Review of Systems:   Negative except no cp/sob    []            Unable to obtain ROS due to:       []            mental status change []            sedated []            intubated     Social History     Tobacco Use    Smoking status: Never Smoker    Smokeless tobacco: Never Used   Substance Use Topics    Alcohol use: No     Medications reviewed:  Current Facility-Administered Medications   Medication Dose Route Frequency    HYDROmorphone (DILAUDID) tablet 2 mg  2 mg Oral Q3H PRN    diphenhydrAMINE (BENADRYL) capsule 25 mg  25 mg Oral Q4H PRN    ondansetron (ZOFRAN ODT) tablet 4 mg  4 mg Oral Q6H PRN    polyethylene glycol (MIRALAX) packet 17 g  17 g Oral BID    magnesium hydroxide (MILK OF MAGNESIA) 400 mg/5 mL oral suspension 30 mL  30 mL Oral DAILY PRN    sodium chloride (NS) flush 5-40 mL  5-40 mL IntraVENous Q8H    sodium chloride (NS) flush 5-40 mL  5-40 mL IntraVENous PRN    acetaminophen (TYLENOL) tablet 650 mg  650 mg Oral Q4H PRN    ondansetron (ZOFRAN) injection 4 mg  4 mg IntraVENous Q4H PRN  ketorolac (TORADOL) injection 30 mg  30 mg IntraVENous Q6H PRN    diphenhydrAMINE (BENADRYL) injection 12.5 mg  12.5 mg IntraVENous Q4H PRN    HYDROmorphone (PF) (DILAUDID) injection 1 mg  1 mg IntraVENous Q3H PRN    enoxaparin (LOVENOX) injection 40 mg  40 mg SubCUTAneous Q24H    dextrose 5% - 0.45% NaCl with KCl 20 mEq/L infusion  100 mL/hr IntraVENous CONTINUOUS        Objective:   Vitals:  Visit Vitals  /75 (BP 1 Location: Left arm, BP Patient Position: Sitting)   Pulse 82   Temp 98.7 °F (37.1 °C)   Resp 18   Ht 5' 10\" (1.778 m)   Wt 202 lb 13.2 oz (92 kg)   SpO2 100%   Breastfeeding? No   BMI 29.10 kg/m²     Temp (24hrs), Av.5 °F (36.9 °C), Min:98 °F (36.7 °C), Max:99 °F (37.2 °C)    O2 Flow Rate (L/min): 1.5 l/min O2 Device: Nasal cannula    Last 24hr Input/Output:    Intake/Output Summary (Last 24 hours) at 2019 0738  Last data filed at 2019 2857  Gross per 24 hour   Intake 1560 ml   Output    Net 1560 ml        PHYSICAL EXAM:  General:    Alert, cooperative, no distress, appears stated age. Head:   Normocephalic, without obvious abnormality, atraumatic. Eyes:   Conjunctivae/corneas clear. PERRLA  Nose:  Nares normal. No drainage or sinus tenderness. Throat:    Lips, mucosa, and tongue normal.  No Thrush  Neck:  Supple, symmetrical,  no adenopathy, thyroid: non tender    no carotid bruit and no JVD. Back:    Symmetric,  No CVA tenderness. Lungs:   Clear to auscultation bilaterally. No Wheezing or Rhonchi. No rales. Chest wall:  No tenderness or deformity. No Accessory muscle use. Heart:   Regular rate and rhythm,  no murmur, rub or gallop. Abdomen:   Soft, non-tender. Not distended.   Bowel sounds normal. No masses        Lab Data Reviewed:    Recent Labs     19  1229 19  0147   WBC 8.4 8.9   HGB 7.8* 8.8*   HCT 22.2* 25.6*    193     Recent Labs     19  1229 19  0147    139   K 4.4 3.9    108   CO2 28 27   GLU 72 95   BUN 7 8 CREA 0.85 0.71   CA 8.2* 8.5   ALB 3.5 3.9   TBILI 1.7* 1.6*   SGOT 39* 37   ALT 22 21     Lab Results   Component Value Date/Time    Glucose (POC) 110 (H) 05/30/2019 08:04 AM    Glucose (POC) 116 (H) 02/12/2019 04:43 PM    Glucose (POC) 126 (H) 02/12/2019 11:44 AM    Glucose (POC) 109 (H) 02/12/2019 07:37 AM    Glucose (POC) 101 (H) 02/11/2019 04:18 PM     No results for input(s): PH, PCO2, PO2, HCO3, FIO2 in the last 72 hours. No results for input(s): INR in the last 72 hours.     No lab exists for component: Jose Malady  ___________________________________________________  ___________________________________________________    Attending Physician: Marbella Garrido MD

## 2019-09-08 NOTE — PROGRESS NOTES
Bedside and Verbal shift change report given to Montse Fine (oncoming nurse) by Sussy Chavez (offgoing nurse). Report included the following information SBAR, Kardex, Intake/Output and MAR.

## 2019-09-09 LAB
BASOPHILS # BLD: 0 K/UL (ref 0–0.1)
BASOPHILS NFR BLD: 0 % (ref 0–1)
DIFFERENTIAL METHOD BLD: ABNORMAL
EOSINOPHIL # BLD: 0.6 K/UL (ref 0–0.4)
EOSINOPHIL NFR BLD: 6 % (ref 0–7)
ERYTHROCYTE [DISTWIDTH] IN BLOOD BY AUTOMATED COUNT: 15.1 % (ref 11.5–14.5)
HCT VFR BLD AUTO: 20.3 % (ref 35–47)
HGB BLD-MCNC: 7 G/DL (ref 11.5–16)
IMM GRANULOCYTES # BLD AUTO: 0 K/UL (ref 0–0.04)
IMM GRANULOCYTES NFR BLD AUTO: 0 % (ref 0–0.5)
LYMPHOCYTES # BLD: 5 K/UL (ref 0.8–3.5)
LYMPHOCYTES NFR BLD: 55 % (ref 12–49)
MCH RBC QN AUTO: 32.3 PG (ref 26–34)
MCHC RBC AUTO-ENTMCNC: 34.5 G/DL (ref 30–36.5)
MCV RBC AUTO: 93.5 FL (ref 80–99)
MONOCYTES # BLD: 1.3 K/UL (ref 0–1)
MONOCYTES NFR BLD: 14 % (ref 5–13)
NEUTS SEG # BLD: 2.3 K/UL (ref 1.8–8)
NEUTS SEG NFR BLD: 25 % (ref 32–75)
NRBC # BLD: 0.08 K/UL (ref 0–0.01)
NRBC BLD-RTO: 0.9 PER 100 WBC
PLATELET # BLD AUTO: 150 K/UL (ref 150–400)
PMV BLD AUTO: 11.6 FL (ref 8.9–12.9)
RBC # BLD AUTO: 2.17 M/UL (ref 3.8–5.2)
WBC # BLD AUTO: 9.3 K/UL (ref 3.6–11)

## 2019-09-09 PROCEDURE — 85025 COMPLETE CBC W/AUTO DIFF WBC: CPT

## 2019-09-09 PROCEDURE — 74011250637 HC RX REV CODE- 250/637: Performed by: INTERNAL MEDICINE

## 2019-09-09 PROCEDURE — 74011250636 HC RX REV CODE- 250/636: Performed by: HOSPITALIST

## 2019-09-09 PROCEDURE — 74011250636 HC RX REV CODE- 250/636: Performed by: INTERNAL MEDICINE

## 2019-09-09 PROCEDURE — 77010033678 HC OXYGEN DAILY

## 2019-09-09 PROCEDURE — 65270000029 HC RM PRIVATE

## 2019-09-09 PROCEDURE — 94760 N-INVAS EAR/PLS OXIMETRY 1: CPT

## 2019-09-09 RX ADMIN — DIPHENHYDRAMINE HYDROCHLORIDE 12.5 MG: 50 INJECTION, SOLUTION INTRAMUSCULAR; INTRAVENOUS at 14:16

## 2019-09-09 RX ADMIN — HYDROMORPHONE HYDROCHLORIDE 1 MG: 2 INJECTION, SOLUTION INTRAMUSCULAR; INTRAVENOUS; SUBCUTANEOUS at 05:04

## 2019-09-09 RX ADMIN — DIPHENHYDRAMINE HYDROCHLORIDE 12.5 MG: 50 INJECTION, SOLUTION INTRAMUSCULAR; INTRAVENOUS at 01:02

## 2019-09-09 RX ADMIN — DIPHENHYDRAMINE HYDROCHLORIDE 12.5 MG: 50 INJECTION, SOLUTION INTRAMUSCULAR; INTRAVENOUS at 19:33

## 2019-09-09 RX ADMIN — ONDANSETRON 4 MG: 2 INJECTION INTRAMUSCULAR; INTRAVENOUS at 10:11

## 2019-09-09 RX ADMIN — ONDANSETRON 4 MG: 2 INJECTION INTRAMUSCULAR; INTRAVENOUS at 01:02

## 2019-09-09 RX ADMIN — PANTOPRAZOLE SODIUM 40 MG: 40 TABLET, DELAYED RELEASE ORAL at 06:38

## 2019-09-09 RX ADMIN — ONDANSETRON 4 MG: 2 INJECTION INTRAMUSCULAR; INTRAVENOUS at 14:15

## 2019-09-09 RX ADMIN — ENOXAPARIN SODIUM 40 MG: 40 INJECTION, SOLUTION INTRAVENOUS; SUBCUTANEOUS at 17:13

## 2019-09-09 RX ADMIN — HYDROMORPHONE HYDROCHLORIDE 1 MG: 2 INJECTION, SOLUTION INTRAMUSCULAR; INTRAVENOUS; SUBCUTANEOUS at 08:08

## 2019-09-09 RX ADMIN — HYDROMORPHONE HYDROCHLORIDE 1 MG: 2 INJECTION, SOLUTION INTRAMUSCULAR; INTRAVENOUS; SUBCUTANEOUS at 14:16

## 2019-09-09 RX ADMIN — DIPHENHYDRAMINE HYDROCHLORIDE 12.5 MG: 50 INJECTION, SOLUTION INTRAMUSCULAR; INTRAVENOUS at 05:04

## 2019-09-09 RX ADMIN — Medication 10 ML: at 17:13

## 2019-09-09 RX ADMIN — HYDROMORPHONE HYDROCHLORIDE 1 MG: 2 INJECTION, SOLUTION INTRAMUSCULAR; INTRAVENOUS; SUBCUTANEOUS at 17:17

## 2019-09-09 RX ADMIN — DEXTROSE MONOHYDRATE, SODIUM CHLORIDE, AND POTASSIUM CHLORIDE 100 ML/HR: 50; 4.5; 1.49 INJECTION, SOLUTION INTRAVENOUS at 19:59

## 2019-09-09 RX ADMIN — ONDANSETRON 4 MG: 2 INJECTION INTRAMUSCULAR; INTRAVENOUS at 19:33

## 2019-09-09 RX ADMIN — Medication 10 ML: at 05:07

## 2019-09-09 RX ADMIN — DIPHENHYDRAMINE HYDROCHLORIDE 12.5 MG: 50 INJECTION, SOLUTION INTRAMUSCULAR; INTRAVENOUS at 10:11

## 2019-09-09 RX ADMIN — HYDROMORPHONE HYDROCHLORIDE 1 MG: 2 INJECTION, SOLUTION INTRAMUSCULAR; INTRAVENOUS; SUBCUTANEOUS at 01:02

## 2019-09-09 RX ADMIN — POLYETHYLENE GLYCOL 3350 17 G: 17 POWDER, FOR SOLUTION ORAL at 08:07

## 2019-09-09 RX ADMIN — HYDROMORPHONE HYDROCHLORIDE 1 MG: 2 INJECTION, SOLUTION INTRAMUSCULAR; INTRAVENOUS; SUBCUTANEOUS at 11:03

## 2019-09-09 RX ADMIN — DEXTROSE MONOHYDRATE, SODIUM CHLORIDE, AND POTASSIUM CHLORIDE 100 ML/HR: 50; 4.5; 1.49 INJECTION, SOLUTION INTRAVENOUS at 10:14

## 2019-09-09 RX ADMIN — HYDROMORPHONE HYDROCHLORIDE 2 MG: 2 TABLET ORAL at 20:31

## 2019-09-09 RX ADMIN — ONDANSETRON 4 MG: 2 INJECTION INTRAMUSCULAR; INTRAVENOUS at 05:04

## 2019-09-09 RX ADMIN — POLYETHYLENE GLYCOL 3350 17 G: 17 POWDER, FOR SOLUTION ORAL at 17:13

## 2019-09-09 NOTE — PROGRESS NOTES
General Surgery End of Shift Nursing Note    Bedside shift change report given to Cornelius Phan  (oncoming nurse) by Aden Krishnamurthy RN (offgoing nurse). Report included the following information SBAR. Shift worked:   7am-7pm   Summary of shift:  Patient in bed this morning    Generalized pain    Dilaudid Benadryl and Zofran given several times today    Midline flushed, CHG and all ports positive blood return. Issues for physician to address:        Number times ambulated in hallway past shift: 0  Number of times OOB to chair past shift: sits up in bed all day    Pain Management:  Current medication: dilaudid  Patient states pain is manageable on current pain medication: YES    GI:    Current diet:  DIET REGULAR    Tolerating current diet: YES  Passing flatus: NO  Last Bowel Movement: several days ago   Appearance:     Respiratory:    Incentive Spirometer at bedside: YES  Patient instructed on use: YES    Patient Safety:    Falls Score: 2  Bed Alarm On? Not applicable  Sitter?  Not applicable    Aden Krishnamurthy

## 2019-09-10 LAB
ALBUMIN SERPL-MCNC: 3.3 G/DL (ref 3.5–5)
ALBUMIN/GLOB SERPL: 0.8 {RATIO} (ref 1.1–2.2)
ALP SERPL-CCNC: 113 U/L (ref 45–117)
ALT SERPL-CCNC: 22 U/L (ref 12–78)
ANION GAP SERPL CALC-SCNC: 3 MMOL/L (ref 5–15)
AST SERPL-CCNC: 39 U/L (ref 15–37)
BASOPHILS # BLD: 0.1 K/UL (ref 0–0.1)
BASOPHILS NFR BLD: 1 % (ref 0–1)
BILIRUB SERPL-MCNC: 2.1 MG/DL (ref 0.2–1)
BLASTS NFR BLD MANUAL: 0 %
BUN SERPL-MCNC: 5 MG/DL (ref 6–20)
BUN/CREAT SERPL: 6 (ref 12–20)
CALCIUM SERPL-MCNC: 8.2 MG/DL (ref 8.5–10.1)
CHLORIDE SERPL-SCNC: 104 MMOL/L (ref 97–108)
CO2 SERPL-SCNC: 32 MMOL/L (ref 21–32)
CREAT SERPL-MCNC: 0.87 MG/DL (ref 0.55–1.02)
DIFFERENTIAL METHOD BLD: ABNORMAL
EOSINOPHIL # BLD: 0.4 K/UL (ref 0–0.4)
EOSINOPHIL NFR BLD: 4 % (ref 0–7)
ERYTHROCYTE [DISTWIDTH] IN BLOOD BY AUTOMATED COUNT: 15.5 % (ref 11.5–14.5)
GLOBULIN SER CALC-MCNC: 4.2 G/DL (ref 2–4)
GLUCOSE SERPL-MCNC: 82 MG/DL (ref 65–100)
HCT VFR BLD AUTO: 22.2 % (ref 35–47)
HGB BLD-MCNC: 7.5 G/DL (ref 11.5–16)
IMM GRANULOCYTES # BLD AUTO: 0 K/UL
IMM GRANULOCYTES NFR BLD AUTO: 0 %
LYMPHOCYTES # BLD: 4.7 K/UL (ref 0.8–3.5)
LYMPHOCYTES NFR BLD: 51 % (ref 12–49)
MCH RBC QN AUTO: 31.9 PG (ref 26–34)
MCHC RBC AUTO-ENTMCNC: 33.8 G/DL (ref 30–36.5)
MCV RBC AUTO: 94.5 FL (ref 80–99)
METAMYELOCYTES NFR BLD MANUAL: 0 %
MONOCYTES # BLD: 0.7 K/UL (ref 0–1)
MONOCYTES NFR BLD: 7 % (ref 5–13)
MYELOCYTES NFR BLD MANUAL: 0 %
NEUTS BAND NFR BLD MANUAL: 0 % (ref 0–6)
NEUTS SEG # BLD: 3.4 K/UL (ref 1.8–8)
NEUTS SEG NFR BLD: 37 % (ref 32–75)
NRBC # BLD: 0.11 K/UL (ref 0–0.01)
NRBC BLD-RTO: 1.2 PER 100 WBC
OTHER CELLS NFR BLD MANUAL: 0 %
PLATELET # BLD AUTO: 184 K/UL (ref 150–400)
PMV BLD AUTO: 12 FL (ref 8.9–12.9)
POTASSIUM SERPL-SCNC: 4.2 MMOL/L (ref 3.5–5.1)
PROMYELOCYTES NFR BLD MANUAL: 0 %
PROT SERPL-MCNC: 7.5 G/DL (ref 6.4–8.2)
RBC # BLD AUTO: 2.35 M/UL (ref 3.8–5.2)
RBC MORPH BLD: ABNORMAL
SODIUM SERPL-SCNC: 139 MMOL/L (ref 136–145)
WBC # BLD AUTO: 9.3 K/UL (ref 3.6–11)

## 2019-09-10 PROCEDURE — 74011250637 HC RX REV CODE- 250/637: Performed by: INTERNAL MEDICINE

## 2019-09-10 PROCEDURE — 74011250636 HC RX REV CODE- 250/636: Performed by: HOSPITALIST

## 2019-09-10 PROCEDURE — 36415 COLL VENOUS BLD VENIPUNCTURE: CPT

## 2019-09-10 PROCEDURE — 85027 COMPLETE CBC AUTOMATED: CPT

## 2019-09-10 PROCEDURE — 80053 COMPREHEN METABOLIC PANEL: CPT

## 2019-09-10 PROCEDURE — 65270000029 HC RM PRIVATE

## 2019-09-10 PROCEDURE — 94760 N-INVAS EAR/PLS OXIMETRY 1: CPT

## 2019-09-10 PROCEDURE — 77010033678 HC OXYGEN DAILY

## 2019-09-10 PROCEDURE — 74011250636 HC RX REV CODE- 250/636: Performed by: INTERNAL MEDICINE

## 2019-09-10 RX ADMIN — ONDANSETRON 4 MG: 2 INJECTION INTRAMUSCULAR; INTRAVENOUS at 10:26

## 2019-09-10 RX ADMIN — ONDANSETRON 4 MG: 2 INJECTION INTRAMUSCULAR; INTRAVENOUS at 15:22

## 2019-09-10 RX ADMIN — Medication 10 ML: at 05:05

## 2019-09-10 RX ADMIN — Medication 10 ML: at 00:00

## 2019-09-10 RX ADMIN — POLYETHYLENE GLYCOL 3350 17 G: 17 POWDER, FOR SOLUTION ORAL at 18:19

## 2019-09-10 RX ADMIN — DEXTROSE MONOHYDRATE, SODIUM CHLORIDE, AND POTASSIUM CHLORIDE 100 ML/HR: 50; 4.5; 1.49 INJECTION, SOLUTION INTRAVENOUS at 15:00

## 2019-09-10 RX ADMIN — DIPHENHYDRAMINE HYDROCHLORIDE 25 MG: 25 CAPSULE ORAL at 00:00

## 2019-09-10 RX ADMIN — HYDROMORPHONE HYDROCHLORIDE 1 MG: 2 INJECTION, SOLUTION INTRAMUSCULAR; INTRAVENOUS; SUBCUTANEOUS at 12:13

## 2019-09-10 RX ADMIN — HYDROMORPHONE HYDROCHLORIDE 1 MG: 2 INJECTION, SOLUTION INTRAMUSCULAR; INTRAVENOUS; SUBCUTANEOUS at 15:22

## 2019-09-10 RX ADMIN — POLYETHYLENE GLYCOL 3350 17 G: 17 POWDER, FOR SOLUTION ORAL at 08:37

## 2019-09-10 RX ADMIN — Medication 10 ML: at 15:00

## 2019-09-10 RX ADMIN — DEXTROSE MONOHYDRATE, SODIUM CHLORIDE, AND POTASSIUM CHLORIDE 100 ML/HR: 50; 4.5; 1.49 INJECTION, SOLUTION INTRAVENOUS at 05:20

## 2019-09-10 RX ADMIN — HYDROMORPHONE HYDROCHLORIDE 1 MG: 2 INJECTION, SOLUTION INTRAMUSCULAR; INTRAVENOUS; SUBCUTANEOUS at 21:41

## 2019-09-10 RX ADMIN — HYDROMORPHONE HYDROCHLORIDE 1 MG: 2 INJECTION, SOLUTION INTRAMUSCULAR; INTRAVENOUS; SUBCUTANEOUS at 05:05

## 2019-09-10 RX ADMIN — ONDANSETRON 4 MG: 2 INJECTION INTRAMUSCULAR; INTRAVENOUS at 19:51

## 2019-09-10 RX ADMIN — PANTOPRAZOLE SODIUM 40 MG: 40 TABLET, DELAYED RELEASE ORAL at 08:36

## 2019-09-10 RX ADMIN — DIPHENHYDRAMINE HYDROCHLORIDE 12.5 MG: 50 INJECTION, SOLUTION INTRAMUSCULAR; INTRAVENOUS at 10:26

## 2019-09-10 RX ADMIN — HYDROMORPHONE HYDROCHLORIDE 1 MG: 2 INJECTION, SOLUTION INTRAMUSCULAR; INTRAVENOUS; SUBCUTANEOUS at 00:00

## 2019-09-10 RX ADMIN — KETOROLAC TROMETHAMINE 30 MG: 30 INJECTION, SOLUTION INTRAMUSCULAR at 02:23

## 2019-09-10 RX ADMIN — ENOXAPARIN SODIUM 40 MG: 40 INJECTION, SOLUTION INTRAVENOUS; SUBCUTANEOUS at 18:19

## 2019-09-10 RX ADMIN — Medication 10 ML: at 21:45

## 2019-09-10 RX ADMIN — DIPHENHYDRAMINE HYDROCHLORIDE 25 MG: 25 CAPSULE ORAL at 05:05

## 2019-09-10 RX ADMIN — HYDROMORPHONE HYDROCHLORIDE 1 MG: 2 INJECTION, SOLUTION INTRAMUSCULAR; INTRAVENOUS; SUBCUTANEOUS at 08:36

## 2019-09-10 RX ADMIN — DIPHENHYDRAMINE HYDROCHLORIDE 12.5 MG: 50 INJECTION, SOLUTION INTRAMUSCULAR; INTRAVENOUS at 19:51

## 2019-09-10 RX ADMIN — DIPHENHYDRAMINE HYDROCHLORIDE 12.5 MG: 50 INJECTION, SOLUTION INTRAMUSCULAR; INTRAVENOUS at 15:22

## 2019-09-10 RX ADMIN — ONDANSETRON 4 MG: 2 INJECTION INTRAMUSCULAR; INTRAVENOUS at 00:00

## 2019-09-10 RX ADMIN — HYDROMORPHONE HYDROCHLORIDE 1 MG: 2 INJECTION, SOLUTION INTRAMUSCULAR; INTRAVENOUS; SUBCUTANEOUS at 18:23

## 2019-09-10 RX ADMIN — ONDANSETRON 4 MG: 2 INJECTION INTRAMUSCULAR; INTRAVENOUS at 05:05

## 2019-09-10 NOTE — PROGRESS NOTES
Problem: Falls - Risk of  Goal: *Absence of Falls  Description  Document Allison Sierra Fall Risk and appropriate interventions in the flowsheet. Outcome: Progressing Towards Goal  Note:   Fall Risk Interventions:            Medication Interventions: Assess postural VS orthostatic hypotension, Evaluate medications/consider consulting pharmacy, Patient to call before getting OOB, Teach patient to arise slowly                   Problem: Patient Education: Go to Patient Education Activity  Goal: Patient/Family Education  Outcome: Progressing Towards Goal     Problem: Pain  Goal: *Control of Pain  Outcome: Progressing Towards Goal  Goal: *PALLIATIVE CARE:  Alleviation of Pain  Outcome: Progressing Towards Goal     Problem: Patient Education: Go to Patient Education Activity  Goal: Patient/Family Education  Outcome: Progressing Towards Goal     Problem: Constipation - Risk of  Goal: *Prevention of constipation  Outcome: Progressing Towards Goal  Goal: *PALLIATIVE CARE:  Prevention/Alleviation of constipation  Outcome: Progressing Towards Goal     Problem: Patient Education: Go to Patient Education Activity  Goal: Patient/Family Education  Outcome: Progressing Towards Goal     Problem: Pressure Injury - Risk of  Goal: *Prevention of pressure injury  Description  Document Nuno Scale and appropriate interventions in the flowsheet.   Outcome: Progressing Towards Goal  Note:   Pressure Injury Interventions:                                            Problem: Patient Education: Go to Patient Education Activity  Goal: Patient/Family Education  Outcome: Progressing Towards Goal

## 2019-09-10 NOTE — PROGRESS NOTES
General Surgery End of Shift Nursing Note    Bedside shift change report given to [facility] RN (oncoming nurse) by Jeral Lennox RN (offgoing nurse). Report included the following information SBAR. Shift worked:   7am-7pm   Summary of shift:   Uneventful shift     Pain control    Issues for physician to address:        Number times ambulated in hallway past shift: 0    Number of times OOB to chair past shift: sits up on the bed all day    Pain Management:  Current medication: dilaudid  Patient states pain is manageable on current pain medication: YES    GI:    Current diet:  DIET REGULAR    Tolerating current diet: YES  Passing flatus: YES   Last Bowel Movement: today   Appearance:     Respiratory:    Incentive Spirometer at bedside: YES  Patient instructed on use: YES    Patient Safety:    Falls Score: 2  Bed Alarm On? Not applicable  Sitter?  Not applicable    Jeral Lennox

## 2019-09-10 NOTE — PROGRESS NOTES
Bedside shift change report given to Alisson Gar RN (oncoming nurse) by Bekah Fernandez RN (offgoing nurse). Report included the following information SBAR, Kardex, Intake/Output, MAR, Accordion, Recent Results, Med Rec Status, Alarm Parameters , Pre Procedure Checklist, Procedure Verification, Quality Measures and Dual Neuro Assessment.

## 2019-09-10 NOTE — PROGRESS NOTES
General Daily Progress Note    Admit Date: 9/6/2019    Subjective:     Patient conts to c/o pain. Current Facility-Administered Medications   Medication Dose Route Frequency    pantoprazole (PROTONIX) tablet 40 mg  40 mg Oral ACB    HYDROmorphone (DILAUDID) tablet 2 mg  2 mg Oral Q3H PRN    diphenhydrAMINE (BENADRYL) capsule 25 mg  25 mg Oral Q4H PRN    ondansetron (ZOFRAN ODT) tablet 4 mg  4 mg Oral Q6H PRN    polyethylene glycol (MIRALAX) packet 17 g  17 g Oral BID    magnesium hydroxide (MILK OF MAGNESIA) 400 mg/5 mL oral suspension 30 mL  30 mL Oral DAILY PRN    sodium chloride (NS) flush 5-40 mL  5-40 mL IntraVENous Q8H    sodium chloride (NS) flush 5-40 mL  5-40 mL IntraVENous PRN    acetaminophen (TYLENOL) tablet 650 mg  650 mg Oral Q4H PRN    ondansetron (ZOFRAN) injection 4 mg  4 mg IntraVENous Q4H PRN    ketorolac (TORADOL) injection 30 mg  30 mg IntraVENous Q6H PRN    diphenhydrAMINE (BENADRYL) injection 12.5 mg  12.5 mg IntraVENous Q4H PRN    HYDROmorphone (PF) (DILAUDID) injection 1 mg  1 mg IntraVENous Q3H PRN    enoxaparin (LOVENOX) injection 40 mg  40 mg SubCUTAneous Q24H    dextrose 5% - 0.45% NaCl with KCl 20 mEq/L infusion  100 mL/hr IntraVENous CONTINUOUS        Review of Systems  A comprehensive review of systems was negative. Objective:     Patient Vitals for the past 24 hrs:   BP Temp Pulse Resp SpO2   09/10/19 0811 119/85       09/10/19 0749 114/76 98.6 °F (37 °C) 79 16 98 %   09/10/19 0514 142/82 98.3 °F (36.8 °C) 79 16 98 %   09/10/19 0003 135/77 99.2 °F (37.3 °C) 90 16 98 %   09/09/19 1909 136/78 99.1 °F (37.3 °C) 93 16 97 %   09/09/19 1552 (!) 146/99 99.4 °F (37.4 °C) 85 16 97 %   09/09/19 1229 135/74 99.3 °F (37.4 °C) 98 18 96 %     No intake/output data recorded. 09/08 1901 - 09/10 0700  In: 4130 [P.O.:500;  I.V.:3630]  Out: 600 [Urine:600]    Physical Exam:   Visit Vitals  /85 (BP 1 Location: Left arm, BP Patient Position: Supine)   Pulse 79   Temp 98.6 °F (37 °C)   Resp 16   Ht 5' 10\" (1.778 m)   Wt 202 lb 13.2 oz (92 kg)   SpO2 98%   Breastfeeding? No   BMI 29.10 kg/m²     General appearance: alert, cooperative, no distress, appears stated age  Neck: supple, symmetrical, trachea midline, no adenopathy, thyroid: not enlarged, symmetric, no tenderness/mass/nodules, no carotid bruit and no JVD  Lungs: clear to auscultation bilaterally  Heart: regular rate and rhythm, S1, S2 normal, no murmur, click, rub or gallop  Abdomen: soft, non-tender. Bowel sounds normal. No masses,  no organomegaly  Extremities: extremities normal, atraumatic, no cyanosis or edema        Data Review   Recent Results (from the past 24 hour(s))   METABOLIC PANEL, COMPREHENSIVE    Collection Time: 09/10/19  5:06 AM   Result Value Ref Range    Sodium 139 136 - 145 mmol/L    Potassium 4.2 3.5 - 5.1 mmol/L    Chloride 104 97 - 108 mmol/L    CO2 32 21 - 32 mmol/L    Anion gap 3 (L) 5 - 15 mmol/L    Glucose 82 65 - 100 mg/dL    BUN 5 (L) 6 - 20 MG/DL    Creatinine 0.87 0.55 - 1.02 MG/DL    BUN/Creatinine ratio 6 (L) 12 - 20      GFR est AA >60 >60 ml/min/1.73m2    GFR est non-AA >60 >60 ml/min/1.73m2    Calcium 8.2 (L) 8.5 - 10.1 MG/DL    Bilirubin, total 2.1 (H) 0.2 - 1.0 MG/DL    ALT (SGPT) 22 12 - 78 U/L    AST (SGOT) 39 (H) 15 - 37 U/L    Alk.  phosphatase 113 45 - 117 U/L    Protein, total 7.5 6.4 - 8.2 g/dL    Albumin 3.3 (L) 3.5 - 5.0 g/dL    Globulin 4.2 (H) 2.0 - 4.0 g/dL    A-G Ratio 0.8 (L) 1.1 - 2.2     CBC WITH MANUAL DIFF    Collection Time: 09/10/19  5:06 AM   Result Value Ref Range    WBC 9.3 3.6 - 11.0 K/uL    RBC 2.35 (L) 3.80 - 5.20 M/uL    HGB 7.5 (L) 11.5 - 16.0 g/dL    HCT 22.2 (L) 35.0 - 47.0 %    MCV 94.5 80.0 - 99.0 FL    MCH 31.9 26.0 - 34.0 PG    MCHC 33.8 30.0 - 36.5 g/dL    RDW 15.5 (H) 11.5 - 14.5 %    PLATELET 572 216 - 743 K/uL    MPV 12.0 8.9 - 12.9 FL    NRBC 1.2 (H) 0  WBC    ABSOLUTE NRBC 0.11 (H) 0.00 - 0.01 K/uL    NEUTROPHILS 37 32 - 75 %    BAND NEUTROPHILS 0 0 - 6 %    LYMPHOCYTES 51 (H) 12 - 49 %    MONOCYTES 7 5 - 13 %    EOSINOPHILS 4 0 - 7 %    BASOPHILS 1 0 - 1 %    METAMYELOCYTES 0 0 %    MYELOCYTES 0 0 %    PROMYELOCYTES 0 0 %    BLASTS 0 0 %    OTHER CELL 0 0      IMMATURE GRANULOCYTES 0 %    ABS. NEUTROPHILS 3.4 1.8 - 8.0 K/UL    ABS. LYMPHOCYTES 4.7 (H) 0.8 - 3.5 K/UL    ABS. MONOCYTES 0.7 0.0 - 1.0 K/UL    ABS. EOSINOPHILS 0.4 0.0 - 0.4 K/UL    ABS. BASOPHILS 0.1 0.0 - 0.1 K/UL    ABS. IMM. GRANS. 0.0 K/UL    DF MANUAL      RBC COMMENTS ANISOCYTOSIS  1+        RBC COMMENTS TARGET CELLS  PRESENT        RBC COMMENTS SICKLE CELLS  PRESENT               Assessment:     Principal Problem:    Sickle cell pain crisis ([facility] [address] (9/8/2018)    Active Problems:    Hypertension (10/3/2014)      Venous insufficiency (10/3/2014)      Depression (10/5/2014)        Plan: 1. Cont treatment of painful crisis. 2.Cotemplating Hydroxyurea.

## 2019-09-10 NOTE — PROGRESS NOTES
General Daily Progress Note    Admit Date: 9/6/2019    Subjective:     Patient conts tp c/o pain. Current Facility-Administered Medications   Medication Dose Route Frequency    pantoprazole (PROTONIX) tablet 40 mg  40 mg Oral ACB    HYDROmorphone (DILAUDID) tablet 2 mg  2 mg Oral Q3H PRN    diphenhydrAMINE (BENADRYL) capsule 25 mg  25 mg Oral Q4H PRN    ondansetron (ZOFRAN ODT) tablet 4 mg  4 mg Oral Q6H PRN    polyethylene glycol (MIRALAX) packet 17 g  17 g Oral BID    magnesium hydroxide (MILK OF MAGNESIA) 400 mg/5 mL oral suspension 30 mL  30 mL Oral DAILY PRN    sodium chloride (NS) flush 5-40 mL  5-40 mL IntraVENous Q8H    sodium chloride (NS) flush 5-40 mL  5-40 mL IntraVENous PRN    acetaminophen (TYLENOL) tablet 650 mg  650 mg Oral Q4H PRN    ondansetron (ZOFRAN) injection 4 mg  4 mg IntraVENous Q4H PRN    ketorolac (TORADOL) injection 30 mg  30 mg IntraVENous Q6H PRN    diphenhydrAMINE (BENADRYL) injection 12.5 mg  12.5 mg IntraVENous Q4H PRN    HYDROmorphone (PF) (DILAUDID) injection 1 mg  1 mg IntraVENous Q3H PRN    enoxaparin (LOVENOX) injection 40 mg  40 mg SubCUTAneous Q24H    dextrose 5% - 0.45% NaCl with KCl 20 mEq/L infusion  100 mL/hr IntraVENous CONTINUOUS        Review of Systems  A comprehensive review of systems was negative. Objective:     Patient Vitals for the past 24 hrs:   BP Temp Pulse Resp SpO2   09/09/19 1909 136/78 99.1 °F (37.3 °C) 93 16 97 %   09/09/19 1552 (!) 146/99 99.4 °F (37.4 °C) 85 16 97 %   09/09/19 1229 135/74 99.3 °F (37.4 °C) 98 18 96 %   09/09/19 0754 (!) 148/93 98.3 °F (36.8 °C) 89 18 98 %   09/09/19 0313 122/63 98.3 °F (36.8 °C) 78 18 98 %   09/08/19 2337 140/57 98.8 °F (37.1 °C) 95 19 97 %     No intake/output data recorded.   09/08 0701 - 09/09 1900  In: 5885 [I.V.:3630]  Out: -     Physical Exam:   Visit Vitals  /78 (BP Patient Position: At rest)   Pulse 93   Temp 99.1 °F (37.3 °C)   Resp 16   Ht 5' 10\" (1.778 m)   Wt 202 lb 13.2 oz (92 kg) SpO2 97%   Breastfeeding? No   BMI 29.10 kg/m²     General appearance: alert, cooperative, no distress, appears stated age  Neck: supple, symmetrical, trachea midline, no adenopathy, thyroid: not enlarged, symmetric, no tenderness/mass/nodules, no carotid bruit and no JVD  Lungs: clear to auscultation bilaterally  Heart: regular rate and rhythm, S1, S2 normal, no murmur, click, rub or gallop  Abdomen: soft, non-tender. Bowel sounds normal. No masses,  no organomegaly  Extremities: extremities normal, atraumatic, no cyanosis or edema        Data Review   Recent Results (from the past 24 hour(s))   CBC WITH AUTOMATED DIFF    Collection Time: 09/09/19  4:44 AM   Result Value Ref Range    WBC 9.3 3.6 - 11.0 K/uL    RBC 2.17 (L) 3.80 - 5.20 M/uL    HGB 7.0 (L) 11.5 - 16.0 g/dL    HCT 20.3 (L) 35.0 - 47.0 %    MCV 93.5 80.0 - 99.0 FL    MCH 32.3 26.0 - 34.0 PG    MCHC 34.5 30.0 - 36.5 g/dL    RDW 15.1 (H) 11.5 - 14.5 %    PLATELET 297 019 - 842 K/uL    MPV 11.6 8.9 - 12.9 FL    NRBC 0.9 (H) 0  WBC    ABSOLUTE NRBC 0.08 (H) 0.00 - 0.01 K/uL    NEUTROPHILS 25 (L) 32 - 75 %    LYMPHOCYTES 55 (H) 12 - 49 %    MONOCYTES 14 (H) 5 - 13 %    EOSINOPHILS 6 0 - 7 %    BASOPHILS 0 0 - 1 %    IMMATURE GRANULOCYTES 0 0.0 - 0.5 %    ABS. NEUTROPHILS 2.3 1.8 - 8.0 K/UL    ABS. LYMPHOCYTES 5.0 (H) 0.8 - 3.5 K/UL    ABS. MONOCYTES 1.3 (H) 0.0 - 1.0 K/UL    ABS. EOSINOPHILS 0.6 (H) 0.0 - 0.4 K/UL    ABS. BASOPHILS 0.0 0.0 - 0.1 K/UL    ABS. IMM. GRANS. 0.0 0.00 - 0.04 K/UL    DF AUTOMATED             Assessment:     Principal Problem:    Sickle cell pain crisis ([address] (9/8/2018)    Active Problems:    Hypertension (10/3/2014)      Venous insufficiency (10/3/2014)      Depression (10/5/2014)        Plan: 1. Cont with pain control. 2.Hydration improving.

## 2019-09-11 LAB
ALBUMIN SERPL-MCNC: 3.1 G/DL (ref 3.5–5)
ALBUMIN/GLOB SERPL: 0.8 {RATIO} (ref 1.1–2.2)
ALP SERPL-CCNC: 115 U/L (ref 45–117)
ALT SERPL-CCNC: 20 U/L (ref 12–78)
ANION GAP SERPL CALC-SCNC: 1 MMOL/L (ref 5–15)
AST SERPL-CCNC: 31 U/L (ref 15–37)
BILIRUB SERPL-MCNC: 1.8 MG/DL (ref 0.2–1)
BUN SERPL-MCNC: 6 MG/DL (ref 6–20)
BUN/CREAT SERPL: 7 (ref 12–20)
CALCIUM SERPL-MCNC: 8.2 MG/DL (ref 8.5–10.1)
CHLORIDE SERPL-SCNC: 105 MMOL/L (ref 97–108)
CO2 SERPL-SCNC: 34 MMOL/L (ref 21–32)
CREAT SERPL-MCNC: 0.84 MG/DL (ref 0.55–1.02)
GLOBULIN SER CALC-MCNC: 3.9 G/DL (ref 2–4)
GLUCOSE SERPL-MCNC: 90 MG/DL (ref 65–100)
POTASSIUM SERPL-SCNC: 4.2 MMOL/L (ref 3.5–5.1)
PROT SERPL-MCNC: 7 G/DL (ref 6.4–8.2)
SODIUM SERPL-SCNC: 140 MMOL/L (ref 136–145)

## 2019-09-11 PROCEDURE — 74011250636 HC RX REV CODE- 250/636: Performed by: HOSPITALIST

## 2019-09-11 PROCEDURE — 80053 COMPREHEN METABOLIC PANEL: CPT

## 2019-09-11 PROCEDURE — 74011250637 HC RX REV CODE- 250/637: Performed by: INTERNAL MEDICINE

## 2019-09-11 PROCEDURE — 94760 N-INVAS EAR/PLS OXIMETRY 1: CPT

## 2019-09-11 PROCEDURE — 77010033678 HC OXYGEN DAILY

## 2019-09-11 PROCEDURE — 74011250636 HC RX REV CODE- 250/636: Performed by: INTERNAL MEDICINE

## 2019-09-11 PROCEDURE — 36415 COLL VENOUS BLD VENIPUNCTURE: CPT

## 2019-09-11 PROCEDURE — 65270000029 HC RM PRIVATE

## 2019-09-11 RX ADMIN — Medication 10 ML: at 21:49

## 2019-09-11 RX ADMIN — DIPHENHYDRAMINE HYDROCHLORIDE 12.5 MG: 50 INJECTION, SOLUTION INTRAMUSCULAR; INTRAVENOUS at 09:26

## 2019-09-11 RX ADMIN — ONDANSETRON 4 MG: 2 INJECTION INTRAMUSCULAR; INTRAVENOUS at 21:41

## 2019-09-11 RX ADMIN — DEXTROSE MONOHYDRATE, SODIUM CHLORIDE, AND POTASSIUM CHLORIDE 100 ML/HR: 50; 4.5; 1.49 INJECTION, SOLUTION INTRAVENOUS at 11:01

## 2019-09-11 RX ADMIN — HYDROMORPHONE HYDROCHLORIDE 1 MG: 2 INJECTION, SOLUTION INTRAMUSCULAR; INTRAVENOUS; SUBCUTANEOUS at 08:41

## 2019-09-11 RX ADMIN — HYDROMORPHONE HYDROCHLORIDE 1 MG: 2 INJECTION, SOLUTION INTRAMUSCULAR; INTRAVENOUS; SUBCUTANEOUS at 11:31

## 2019-09-11 RX ADMIN — ONDANSETRON 4 MG: 2 INJECTION INTRAMUSCULAR; INTRAVENOUS at 13:40

## 2019-09-11 RX ADMIN — ONDANSETRON 4 MG: 2 INJECTION INTRAMUSCULAR; INTRAVENOUS at 00:43

## 2019-09-11 RX ADMIN — DIPHENHYDRAMINE HYDROCHLORIDE 12.5 MG: 50 INJECTION, SOLUTION INTRAMUSCULAR; INTRAVENOUS at 17:30

## 2019-09-11 RX ADMIN — DIPHENHYDRAMINE HYDROCHLORIDE 12.5 MG: 50 INJECTION, SOLUTION INTRAMUSCULAR; INTRAVENOUS at 21:41

## 2019-09-11 RX ADMIN — PANTOPRAZOLE SODIUM 40 MG: 40 TABLET, DELAYED RELEASE ORAL at 08:43

## 2019-09-11 RX ADMIN — DIPHENHYDRAMINE HYDROCHLORIDE 12.5 MG: 50 INJECTION, SOLUTION INTRAMUSCULAR; INTRAVENOUS at 13:40

## 2019-09-11 RX ADMIN — HYDROMORPHONE HYDROCHLORIDE 1 MG: 2 INJECTION, SOLUTION INTRAMUSCULAR; INTRAVENOUS; SUBCUTANEOUS at 17:30

## 2019-09-11 RX ADMIN — POLYETHYLENE GLYCOL 3350 17 G: 17 POWDER, FOR SOLUTION ORAL at 08:43

## 2019-09-11 RX ADMIN — Medication 10 ML: at 06:00

## 2019-09-11 RX ADMIN — HYDROMORPHONE HYDROCHLORIDE 1 MG: 2 INJECTION, SOLUTION INTRAMUSCULAR; INTRAVENOUS; SUBCUTANEOUS at 00:43

## 2019-09-11 RX ADMIN — DEXTROSE MONOHYDRATE, SODIUM CHLORIDE, AND POTASSIUM CHLORIDE 100 ML/HR: 50; 4.5; 1.49 INJECTION, SOLUTION INTRAVENOUS at 21:41

## 2019-09-11 RX ADMIN — HYDROMORPHONE HYDROCHLORIDE 1 MG: 2 INJECTION, SOLUTION INTRAMUSCULAR; INTRAVENOUS; SUBCUTANEOUS at 14:28

## 2019-09-11 RX ADMIN — POLYETHYLENE GLYCOL 3350 17 G: 17 POWDER, FOR SOLUTION ORAL at 17:30

## 2019-09-11 RX ADMIN — ONDANSETRON 4 MG: 2 INJECTION INTRAMUSCULAR; INTRAVENOUS at 09:26

## 2019-09-11 RX ADMIN — ONDANSETRON 4 MG: 2 INJECTION INTRAMUSCULAR; INTRAVENOUS at 17:30

## 2019-09-11 RX ADMIN — DIPHENHYDRAMINE HYDROCHLORIDE 12.5 MG: 50 INJECTION, SOLUTION INTRAMUSCULAR; INTRAVENOUS at 00:43

## 2019-09-11 RX ADMIN — Medication 10 ML: at 13:40

## 2019-09-11 RX ADMIN — ENOXAPARIN SODIUM 40 MG: 40 INJECTION, SOLUTION INTRAVENOUS; SUBCUTANEOUS at 17:30

## 2019-09-11 RX ADMIN — DEXTROSE MONOHYDRATE, SODIUM CHLORIDE, AND POTASSIUM CHLORIDE 100 ML/HR: 50; 4.5; 1.49 INJECTION, SOLUTION INTRAVENOUS at 00:43

## 2019-09-11 RX ADMIN — HYDROMORPHONE HYDROCHLORIDE 1 MG: 2 INJECTION, SOLUTION INTRAMUSCULAR; INTRAVENOUS; SUBCUTANEOUS at 20:57

## 2019-09-11 NOTE — PROGRESS NOTES
General Surgery End of Shift Nursing Note    Bedside shift change report given to [address] (oncoming nurse) by Juan Pablo Storey (offgoing nurse). Report included the following information SBAR. Shift worked:   7a-7p   Summary of shift:    Uneventful shift. Had bowel movement today. Pain controlled with PRN medications   Issues for physician to address:   none     Number times ambulated in hallway past shift: 0    Number of times OOB to chair past shift: 3    Pain Management:  Current medication: Dilaudid  Patient states pain is manageable on current pain medication: YES    GI:    Current diet:  DIET REGULAR    Tolerating current diet: YES  Passing flatus: YES  Last Bowel Movement: today       Respiratory:    Incentive Spirometer at bedside: NO  Patient instructed on use: NO    Patient Safety:    Falls Score: 1  Bed Alarm On? No  Sitter?  No    Tata Chew

## 2019-09-11 NOTE — PROGRESS NOTES
KRISTINA Plan:     *Home with family    Patient continues to be treated for pain, due to sickle crisis. CM will continue to follow.     Vish Rausch  Ext [phone]

## 2019-09-11 NOTE — PROGRESS NOTES
General Daily Progress Note    Admit Date: 9/6/2019    Subjective:     Patient cont to c/o pain. Current Facility-Administered Medications   Medication Dose Route Frequency    pantoprazole (PROTONIX) tablet 40 mg  40 mg Oral ACB    HYDROmorphone (DILAUDID) tablet 2 mg  2 mg Oral Q3H PRN    diphenhydrAMINE (BENADRYL) capsule 25 mg  25 mg Oral Q4H PRN    ondansetron (ZOFRAN ODT) tablet 4 mg  4 mg Oral Q6H PRN    polyethylene glycol (MIRALAX) packet 17 g  17 g Oral BID    magnesium hydroxide (MILK OF MAGNESIA) 400 mg/5 mL oral suspension 30 mL  30 mL Oral DAILY PRN    sodium chloride (NS) flush 5-40 mL  5-40 mL IntraVENous Q8H    sodium chloride (NS) flush 5-40 mL  5-40 mL IntraVENous PRN    acetaminophen (TYLENOL) tablet 650 mg  650 mg Oral Q4H PRN    ondansetron (ZOFRAN) injection 4 mg  4 mg IntraVENous Q4H PRN    diphenhydrAMINE (BENADRYL) injection 12.5 mg  12.5 mg IntraVENous Q4H PRN    HYDROmorphone (PF) (DILAUDID) injection 1 mg  1 mg IntraVENous Q3H PRN    enoxaparin (LOVENOX) injection 40 mg  40 mg SubCUTAneous Q24H    dextrose 5% - 0.45% NaCl with KCl 20 mEq/L infusion  100 mL/hr IntraVENous CONTINUOUS        Review of Systems  A comprehensive review of systems was negative. Objective:     Patient Vitals for the past 24 hrs:   BP Temp Pulse Resp SpO2   09/11/19 0734 137/77 99.2 °F (37.3 °C) 83 16 100 %   09/11/19 0409 121/78 98.7 °F (37.1 °C) 84 16 98 %   09/10/19 2310 105/67 99.1 °F (37.3 °C) 87 16 98 %   09/10/19 2104 134/77 98.8 °F (37.1 °C) 84 16 97 %   09/10/19 1532 148/85 98.6 °F (37 °C) 87 16 97 %   09/10/19 1125 146/75  81 16 97 %     No intake/output data recorded. 09/09 1901 - 09/11 0700  In: 3173.3 [P.O.:1820; I.V.:1353.3]  Out: 600 [Urine:600]    Physical Exam:   Visit Vitals  /77   Pulse 83   Temp 99.2 °F (37.3 °C)   Resp 16   Ht 5' 10\" (1.778 m)   Wt 202 lb 13.2 oz (92 kg)   SpO2 100%   Breastfeeding?  No   BMI 29.10 kg/m²     General appearance: alert, cooperative, no distress, appears stated age  Neck: supple, symmetrical, trachea midline, no adenopathy, thyroid: not enlarged, symmetric, no tenderness/mass/nodules, no carotid bruit and no JVD  Lungs: clear to auscultation bilaterally  Heart: regular rate and rhythm, S1, S2 normal, no murmur, click, rub or gallop  Abdomen: soft, non-tender. Bowel sounds normal. No masses,  no organomegaly  Extremities: extremities normal, atraumatic, no cyanosis or edema        Data Review   Recent Results (from the past 24 hour(s))   METABOLIC PANEL, COMPREHENSIVE    Collection Time: 09/11/19  4:43 AM   Result Value Ref Range    Sodium 140 136 - 145 mmol/L    Potassium 4.2 3.5 - 5.1 mmol/L    Chloride 105 97 - 108 mmol/L    CO2 34 (H) 21 - 32 mmol/L    Anion gap 1 (L) 5 - 15 mmol/L    Glucose 90 65 - 100 mg/dL    BUN 6 6 - 20 MG/DL    Creatinine 0.84 0.55 - 1.02 MG/DL    BUN/Creatinine ratio 7 (L) 12 - 20      GFR est AA >60 >60 ml/min/1.73m2    GFR est non-AA >60 >60 ml/min/1.73m2    Calcium 8.2 (L) 8.5 - 10.1 MG/DL    Bilirubin, total 1.8 (H) 0.2 - 1.0 MG/DL    ALT (SGPT) 20 12 - 78 U/L    AST (SGOT) 31 15 - 37 U/L    Alk. phosphatase 115 45 - 117 U/L    Protein, total 7.0 6.4 - 8.2 g/dL    Albumin 3.1 (L) 3.5 - 5.0 g/dL    Globulin 3.9 2.0 - 4.0 g/dL    A-G Ratio 0.8 (L) 1.1 - 2.2             Assessment:     Principal Problem:    Sickle cell pain crisis ([facility] [address] (9/8/2018)    Active Problems:    Hypertension (10/3/2014)      Venous insufficiency (10/3/2014)      Depression (10/5/2014)        Plan: 1. Cont treatment of painful sickle crisis.

## 2019-09-11 NOTE — PROGRESS NOTES
Problem: Falls - Risk of  Goal: *Absence of Falls  Description  Document Micaela Acuna Fall Risk and appropriate interventions in the flowsheet. Outcome: Progressing Towards Goal  Note:   Fall Risk Interventions:            Medication Interventions: Teach patient to arise slowly                   Problem: Patient Education: Go to Patient Education Activity  Goal: Patient/Family Education  Outcome: Progressing Towards Goal     Problem: Pain  Goal: *Control of Pain  Outcome: Progressing Towards Goal  Goal: *PALLIATIVE CARE:  Alleviation of Pain  Outcome: Progressing Towards Goal     Problem: Patient Education: Go to Patient Education Activity  Goal: Patient/Family Education  Outcome: Progressing Towards Goal     Problem: Constipation - Risk of  Goal: *Prevention of constipation  Outcome: Progressing Towards Goal  Goal: *PALLIATIVE CARE:  Prevention/Alleviation of constipation  Outcome: Progressing Towards Goal     Problem: Patient Education: Go to Patient Education Activity  Goal: Patient/Family Education  Outcome: Progressing Towards Goal     Problem: Pressure Injury - Risk of  Goal: *Prevention of pressure injury  Description  Document Nuno Scale and appropriate interventions in the flowsheet.   Outcome: Progressing Towards Goal  Note:   Pressure Injury Interventions:                      Nutrition Interventions: Document food/fluid/supplement intake                     Problem: Patient Education: Go to Patient Education Activity  Goal: Patient/Family Education  Outcome: Progressing Towards Goal

## 2019-09-12 ENCOUNTER — PATIENT OUTREACH (OUTPATIENT)
Dept: INTERNAL MEDICINE CLINIC | Age: 59
End: 2019-09-12

## 2019-09-12 PROCEDURE — 94760 N-INVAS EAR/PLS OXIMETRY 1: CPT

## 2019-09-12 PROCEDURE — 77010033678 HC OXYGEN DAILY

## 2019-09-12 PROCEDURE — 74011250636 HC RX REV CODE- 250/636: Performed by: HOSPITALIST

## 2019-09-12 PROCEDURE — 65270000029 HC RM PRIVATE

## 2019-09-12 PROCEDURE — 74011250636 HC RX REV CODE- 250/636: Performed by: INTERNAL MEDICINE

## 2019-09-12 PROCEDURE — 74011250637 HC RX REV CODE- 250/637: Performed by: INTERNAL MEDICINE

## 2019-09-12 RX ADMIN — ONDANSETRON 4 MG: 2 INJECTION INTRAMUSCULAR; INTRAVENOUS at 09:02

## 2019-09-12 RX ADMIN — ONDANSETRON 4 MG: 2 INJECTION INTRAMUSCULAR; INTRAVENOUS at 03:22

## 2019-09-12 RX ADMIN — Medication 10 ML: at 06:00

## 2019-09-12 RX ADMIN — Medication 10 ML: at 00:17

## 2019-09-12 RX ADMIN — HYDROMORPHONE HYDROCHLORIDE 1 MG: 2 INJECTION, SOLUTION INTRAMUSCULAR; INTRAVENOUS; SUBCUTANEOUS at 03:22

## 2019-09-12 RX ADMIN — Medication 20 ML: at 08:32

## 2019-09-12 RX ADMIN — Medication 10 ML: at 22:00

## 2019-09-12 RX ADMIN — DIPHENHYDRAMINE HYDROCHLORIDE 12.5 MG: 50 INJECTION, SOLUTION INTRAMUSCULAR; INTRAVENOUS at 08:32

## 2019-09-12 RX ADMIN — ONDANSETRON 4 MG: 2 INJECTION INTRAMUSCULAR; INTRAVENOUS at 14:05

## 2019-09-12 RX ADMIN — HYDROMORPHONE HYDROCHLORIDE 2 MG: 2 TABLET ORAL at 20:24

## 2019-09-12 RX ADMIN — HYDROMORPHONE HYDROCHLORIDE 1 MG: 2 INJECTION, SOLUTION INTRAMUSCULAR; INTRAVENOUS; SUBCUTANEOUS at 17:10

## 2019-09-12 RX ADMIN — Medication 10 ML: at 14:06

## 2019-09-12 RX ADMIN — HYDROMORPHONE HYDROCHLORIDE 2 MG: 2 TABLET ORAL at 14:02

## 2019-09-12 RX ADMIN — HYDROMORPHONE HYDROCHLORIDE 1 MG: 2 INJECTION, SOLUTION INTRAMUSCULAR; INTRAVENOUS; SUBCUTANEOUS at 09:59

## 2019-09-12 RX ADMIN — DIPHENHYDRAMINE HYDROCHLORIDE 12.5 MG: 50 INJECTION, SOLUTION INTRAMUSCULAR; INTRAVENOUS at 03:22

## 2019-09-12 RX ADMIN — HYDROMORPHONE HYDROCHLORIDE 1 MG: 2 INJECTION, SOLUTION INTRAMUSCULAR; INTRAVENOUS; SUBCUTANEOUS at 06:48

## 2019-09-12 RX ADMIN — DEXTROSE MONOHYDRATE, SODIUM CHLORIDE, AND POTASSIUM CHLORIDE 100 ML/HR: 50; 4.5; 1.49 INJECTION, SOLUTION INTRAVENOUS at 08:20

## 2019-09-12 RX ADMIN — POLYETHYLENE GLYCOL 3350 17 G: 17 POWDER, FOR SOLUTION ORAL at 17:10

## 2019-09-12 RX ADMIN — HYDROMORPHONE HYDROCHLORIDE 1 MG: 2 INJECTION, SOLUTION INTRAMUSCULAR; INTRAVENOUS; SUBCUTANEOUS at 00:16

## 2019-09-12 RX ADMIN — PANTOPRAZOLE SODIUM 40 MG: 40 TABLET, DELAYED RELEASE ORAL at 08:22

## 2019-09-12 RX ADMIN — ENOXAPARIN SODIUM 40 MG: 40 INJECTION, SOLUTION INTRAVENOUS; SUBCUTANEOUS at 17:10

## 2019-09-12 RX ADMIN — DIPHENHYDRAMINE HYDROCHLORIDE 25 MG: 25 CAPSULE ORAL at 14:05

## 2019-09-12 RX ADMIN — DEXTROSE MONOHYDRATE, SODIUM CHLORIDE, AND POTASSIUM CHLORIDE 100 ML/HR: 50; 4.5; 1.49 INJECTION, SOLUTION INTRAVENOUS at 17:16

## 2019-09-12 RX ADMIN — HYDROMORPHONE HYDROCHLORIDE 1 MG: 2 INJECTION, SOLUTION INTRAMUSCULAR; INTRAVENOUS; SUBCUTANEOUS at 23:44

## 2019-09-12 NOTE — PROGRESS NOTES
Problem: Falls - Risk of  Goal: *Absence of Falls  Description  Document Rex Sahni Fall Risk and appropriate interventions in the flowsheet. Outcome: Progressing Towards Goal  Note:   Fall Risk Interventions:  Mobility Interventions: Strengthening exercises (ROM-active/passive)         Medication Interventions: Teach patient to arise slowly    Elimination Interventions: Patient to call for help with toileting needs              Problem: Patient Education: Go to Patient Education Activity  Goal: Patient/Family Education  Outcome: Progressing Towards Goal     Problem: Pain  Goal: *Control of Pain  Outcome: Progressing Towards Goal  Goal: *PALLIATIVE CARE:  Alleviation of Pain  Outcome: Progressing Towards Goal     Problem: Patient Education: Go to Patient Education Activity  Goal: Patient/Family Education  Outcome: Progressing Towards Goal     Problem: Constipation - Risk of  Goal: *Prevention of constipation  Outcome: Progressing Towards Goal  Goal: *PALLIATIVE CARE:  Prevention/Alleviation of constipation  Outcome: Progressing Towards Goal     Problem: Patient Education: Go to Patient Education Activity  Goal: Patient/Family Education  Outcome: Progressing Towards Goal     Problem: Pressure Injury - Risk of  Goal: *Prevention of pressure injury  Description  Document Nuno Scale and appropriate interventions in the flowsheet.   Outcome: Progressing Towards Goal  Note:   Pressure Injury Interventions:                      Nutrition Interventions: Document food/fluid/supplement intake                     Problem: Patient Education: Go to Patient Education Activity  Goal: Patient/Family Education  Outcome: Progressing Towards Goal

## 2019-09-12 NOTE — PROGRESS NOTES
[facility] 9/6/2019-present:  Sickle Cell Crisis-Case Review  Patient review done for f/u contact. Patient readmitted 9/6 for SS Crisis. Chart Review done:  Forms completion:  submitted to Mgr: Case Review. Goals Addressed                 This Visit's Progress     Admission Care   On track     9/12/2019:  [facility] [place] [id number] of patient's readmission submitted for Case Review Team today. Last call in for [facility] med 9/4/2019. EW           goal completion:  On-going. F/u post Hospital discharge.

## 2019-09-12 NOTE — PROGRESS NOTES
[facility] 7/27-8/8/2019:  Sickle Cell Crisis f/u: Medication   documentation:      Goals Addressed                 This Visit's Progress     Patient/Family verbalizes understanding of self-management of chronic disease. 5/9/2019: [facility] 4/14-5/2/2019:  Sickle Cell Crisis f/u -see coordinate Pain Management Plan goal.  EW     6/7/2019:  Continued teachings done with rationale:    Drink plenty of fluids- dehydrated can increase your risk of a sickle crisis. Recommendation is about 8 glasses a day and drink more fluid if youre exercising or in hot weather. Sleep--encouraged to get enough sleep. Eat right-plenty of fruits, vegetables, whole grains, and protein--discussed the type meat/proteins she enjoyed; Jaz Bob Encouraged exercise in moderation for her; to aim to increase simply walking more around the house/any mod exercise a week;  Encouraged patient to PCP before starting a new exercise routine--Patient stated PCP has been on her at every visit to increase exercise/activity on a daily basis--discussed physical activity is key in staying healthy. NN warned against trying to  overdo it. Rest when you get tired. Take medicine as ordered. Make sure  take your prescription medicine as directed. Get medical and lab tests that your doctor recommends. Discussion on getting the annual flu shot, and pneumococcal and meningococcal vaccines as recommended by PCP in that common illnesses, like the flu, can quickly become dangerous. Discussion on Extreme temperatures: extreme heat or cold, or any craig changes in temperatures, could set off a crisis. Reminded patient of High altitude--leading to Lack of oxygen at high altitudes could trigger a crisis. (Reminded that Planes, because theyre pressurized, shouldnt be a problem). Alcohol. It can make you dehydrated--patient denied consumptions of;    Smoking. This can trigger a lung condition called acute chest syndrome.  This is when sickle cells stick together and block oxygen from getting into your lungs (even warned patient re walking in and out of stores that smokers congregate smoking). Infections. Common illnesses can be very serious for people with SCD. Wash your hands before eating or after using the bathroom. Wash your fruits and veggies, and avoid raw meat, eggs, and unpasteurized milk. Stress-  Though sometimes hard to avoid, but stress or depression can trigger a crisis (states she has not been depressed as much as usual)--advised to try to take time to relax or find techniques that help you calm down and such to prevent and/or relieve depression episodes. Patient agreed to try. EW      6/14/2019:  [facility] 5/26-6/6/2019:  Sickle Cell Crisis f/u  Patient in for f/u office visit today. 1.  Continued discussion of not being able tto obtain Fentanyl Patch & Hydrocodone simultaneously taken. Discussed the opiod crisis and what were her thoughts on the same. 2.  Supported and discussed PCP concerns for weight gain:   Eating same time each day; avoiding snacks; avoiding consumption of processed carbohydrates. Understanding assured with reiteration of advisement. EW     6/19/2019;  Patient Teach-back done:    Drinking plenty of fluids-verbalized understanding of prevention of increased risk of a sickle crisis. States not 8 glasses yet but more than 5.  .  Sleep--states sleeping better but with medications. .  Eat right- agreed to increased buying of fruits & vegetables, whole grains, and sufficient meats without a lot of fat as protein. .States she is walking more as exercise.  -discussed physical activity is key in staying healthy. States she is getting more rest.  EW     6/25/2019:  Patient at office contact for PCP medication re-evaluation. Patient Review:  Proper diet & weight reduction as reviewed by PCP; The 3 advisements from PCP review: 1. eating meals 2.  eliminating snacks,  3. avoiding the consumption of processed carbs. Patient agrees to work on depression-continue anti-depressants as ordered. Advisement:  Eating the right foods at meals; avoiding sugary foods--advised that healthy eating affects mental and physical status; drinking sufficient water prevents dehydration. EW     8/12/2019:  Pain Coordination discussion w/ PCP. EW     9/4/2019:  Call received from patient stating she is run out of pain medication; states pharmacy states no refill on this type medication. Patient requesting refill. C/o painful joints and needs pain medication as soon as possible. Advised PCP at [address] [place] [address] office and medication request for this  must go through Medication Specialist Juan Hernandez; that prescriptions for controlled substance as this are not able to be simply called in, thus NN unable to do so; that Juan Hernandez will reach him with patient concerns hourly if need. Agrees to contact Juan Hernandez. Dressing/Staying warm/drinking warm liquids discussed. Consult done w/ PCP-agreed to look at request/need later this evening.   EW.         .                  goal completion:  9/11/2019

## 2019-09-13 LAB
BASOPHILS # BLD: 0 K/UL (ref 0–0.1)
BASOPHILS NFR BLD: 0 % (ref 0–1)
BLASTS NFR BLD MANUAL: 0 %
DIFFERENTIAL METHOD BLD: ABNORMAL
EOSINOPHIL # BLD: 0.4 K/UL (ref 0–0.4)
EOSINOPHIL NFR BLD: 5 % (ref 0–7)
ERYTHROCYTE [DISTWIDTH] IN BLOOD BY AUTOMATED COUNT: 15.8 % (ref 11.5–14.5)
HCT VFR BLD AUTO: 21.8 % (ref 35–47)
HGB BLD-MCNC: 7.5 G/DL (ref 11.5–16)
IMM GRANULOCYTES # BLD AUTO: 0 K/UL
IMM GRANULOCYTES NFR BLD AUTO: 0 %
LYMPHOCYTES # BLD: 5 K/UL (ref 0.8–3.5)
LYMPHOCYTES NFR BLD: 58 % (ref 12–49)
MCH RBC QN AUTO: 32.9 PG (ref 26–34)
MCHC RBC AUTO-ENTMCNC: 34.4 G/DL (ref 30–36.5)
MCV RBC AUTO: 95.6 FL (ref 80–99)
METAMYELOCYTES NFR BLD MANUAL: 0 %
MONOCYTES # BLD: 1 K/UL (ref 0–1)
MONOCYTES NFR BLD: 12 % (ref 5–13)
MYELOCYTES NFR BLD MANUAL: 0 %
NEUTS BAND NFR BLD MANUAL: 0 % (ref 0–6)
NEUTS SEG # BLD: 2.2 K/UL (ref 1.8–8)
NEUTS SEG NFR BLD: 25 % (ref 32–75)
NRBC # BLD: 0.07 K/UL (ref 0–0.01)
NRBC BLD-RTO: 0.8 PER 100 WBC
OTHER CELLS NFR BLD MANUAL: 0 %
PLATELET # BLD AUTO: 153 K/UL (ref 150–400)
PROMYELOCYTES NFR BLD MANUAL: 0 %
RBC # BLD AUTO: 2.28 M/UL (ref 3.8–5.2)
RBC MORPH BLD: ABNORMAL
RBC MORPH BLD: ABNORMAL
WBC # BLD AUTO: 8.6 K/UL (ref 3.6–11)

## 2019-09-13 PROCEDURE — 74011250636 HC RX REV CODE- 250/636: Performed by: HOSPITALIST

## 2019-09-13 PROCEDURE — 85027 COMPLETE CBC AUTOMATED: CPT

## 2019-09-13 PROCEDURE — 74011250636 HC RX REV CODE- 250/636: Performed by: INTERNAL MEDICINE

## 2019-09-13 PROCEDURE — 65270000029 HC RM PRIVATE

## 2019-09-13 PROCEDURE — 77010033678 HC OXYGEN DAILY

## 2019-09-13 PROCEDURE — 74011250637 HC RX REV CODE- 250/637: Performed by: INTERNAL MEDICINE

## 2019-09-13 PROCEDURE — 94760 N-INVAS EAR/PLS OXIMETRY 1: CPT

## 2019-09-13 PROCEDURE — 36415 COLL VENOUS BLD VENIPUNCTURE: CPT

## 2019-09-13 RX ADMIN — ONDANSETRON 4 MG: 2 INJECTION INTRAMUSCULAR; INTRAVENOUS at 18:30

## 2019-09-13 RX ADMIN — DEXTROSE MONOHYDRATE, SODIUM CHLORIDE, AND POTASSIUM CHLORIDE 100 ML/HR: 50; 4.5; 1.49 INJECTION, SOLUTION INTRAVENOUS at 14:00

## 2019-09-13 RX ADMIN — Medication 10 ML: at 14:01

## 2019-09-13 RX ADMIN — DIPHENHYDRAMINE HYDROCHLORIDE 12.5 MG: 50 INJECTION, SOLUTION INTRAMUSCULAR; INTRAVENOUS at 05:49

## 2019-09-13 RX ADMIN — DIPHENHYDRAMINE HYDROCHLORIDE 12.5 MG: 50 INJECTION, SOLUTION INTRAMUSCULAR; INTRAVENOUS at 22:45

## 2019-09-13 RX ADMIN — ONDANSETRON 4 MG: 2 INJECTION INTRAMUSCULAR; INTRAVENOUS at 09:57

## 2019-09-13 RX ADMIN — DIPHENHYDRAMINE HYDROCHLORIDE 12.5 MG: 50 INJECTION, SOLUTION INTRAMUSCULAR; INTRAVENOUS at 18:30

## 2019-09-13 RX ADMIN — DIPHENHYDRAMINE HYDROCHLORIDE 12.5 MG: 50 INJECTION, SOLUTION INTRAMUSCULAR; INTRAVENOUS at 00:56

## 2019-09-13 RX ADMIN — ONDANSETRON 4 MG: 2 INJECTION INTRAMUSCULAR; INTRAVENOUS at 00:56

## 2019-09-13 RX ADMIN — HYDROMORPHONE HYDROCHLORIDE 1 MG: 2 INJECTION, SOLUTION INTRAMUSCULAR; INTRAVENOUS; SUBCUTANEOUS at 13:02

## 2019-09-13 RX ADMIN — HYDROMORPHONE HYDROCHLORIDE 1 MG: 2 INJECTION, SOLUTION INTRAMUSCULAR; INTRAVENOUS; SUBCUTANEOUS at 08:33

## 2019-09-13 RX ADMIN — Medication 10 ML: at 21:58

## 2019-09-13 RX ADMIN — Medication 10 ML: at 05:50

## 2019-09-13 RX ADMIN — PANTOPRAZOLE SODIUM 40 MG: 40 TABLET, DELAYED RELEASE ORAL at 08:33

## 2019-09-13 RX ADMIN — ONDANSETRON 4 MG: 2 INJECTION INTRAMUSCULAR; INTRAVENOUS at 05:49

## 2019-09-13 RX ADMIN — HYDROMORPHONE HYDROCHLORIDE 1 MG: 2 INJECTION, SOLUTION INTRAMUSCULAR; INTRAVENOUS; SUBCUTANEOUS at 21:59

## 2019-09-13 RX ADMIN — POLYETHYLENE GLYCOL 3350 17 G: 17 POWDER, FOR SOLUTION ORAL at 10:50

## 2019-09-13 RX ADMIN — DIPHENHYDRAMINE HYDROCHLORIDE 12.5 MG: 50 INJECTION, SOLUTION INTRAMUSCULAR; INTRAVENOUS at 13:51

## 2019-09-13 RX ADMIN — ONDANSETRON 4 MG: 2 INJECTION INTRAMUSCULAR; INTRAVENOUS at 22:45

## 2019-09-13 RX ADMIN — DEXTROSE MONOHYDRATE, SODIUM CHLORIDE, AND POTASSIUM CHLORIDE 100 ML/HR: 50; 4.5; 1.49 INJECTION, SOLUTION INTRAVENOUS at 04:08

## 2019-09-13 RX ADMIN — ONDANSETRON 4 MG: 2 INJECTION INTRAMUSCULAR; INTRAVENOUS at 13:51

## 2019-09-13 RX ADMIN — HYDROMORPHONE HYDROCHLORIDE 1 MG: 2 INJECTION, SOLUTION INTRAMUSCULAR; INTRAVENOUS; SUBCUTANEOUS at 18:57

## 2019-09-13 RX ADMIN — DIPHENHYDRAMINE HYDROCHLORIDE 12.5 MG: 50 INJECTION, SOLUTION INTRAMUSCULAR; INTRAVENOUS at 09:57

## 2019-09-13 RX ADMIN — ENOXAPARIN SODIUM 40 MG: 40 INJECTION, SOLUTION INTRAVENOUS; SUBCUTANEOUS at 18:30

## 2019-09-13 RX ADMIN — HYDROMORPHONE HYDROCHLORIDE 1 MG: 2 INJECTION, SOLUTION INTRAMUSCULAR; INTRAVENOUS; SUBCUTANEOUS at 04:08

## 2019-09-13 RX ADMIN — HYDROMORPHONE HYDROCHLORIDE 1 MG: 2 INJECTION, SOLUTION INTRAMUSCULAR; INTRAVENOUS; SUBCUTANEOUS at 16:10

## 2019-09-13 NOTE — PROGRESS NOTES
General Daily Progress Note    Admit Date: 9/6/2019    Subjective:     Patient cont to c/o pain. Current Facility-Administered Medications   Medication Dose Route Frequency    pantoprazole (PROTONIX) tablet 40 mg  40 mg Oral ACB    HYDROmorphone (DILAUDID) tablet 2 mg  2 mg Oral Q3H PRN    diphenhydrAMINE (BENADRYL) capsule 25 mg  25 mg Oral Q4H PRN    ondansetron (ZOFRAN ODT) tablet 4 mg  4 mg Oral Q6H PRN    polyethylene glycol (MIRALAX) packet 17 g  17 g Oral BID    magnesium hydroxide (MILK OF MAGNESIA) 400 mg/5 mL oral suspension 30 mL  30 mL Oral DAILY PRN    sodium chloride (NS) flush 5-40 mL  5-40 mL IntraVENous Q8H    sodium chloride (NS) flush 5-40 mL  5-40 mL IntraVENous PRN    acetaminophen (TYLENOL) tablet 650 mg  650 mg Oral Q4H PRN    ondansetron (ZOFRAN) injection 4 mg  4 mg IntraVENous Q4H PRN    diphenhydrAMINE (BENADRYL) injection 12.5 mg  12.5 mg IntraVENous Q4H PRN    HYDROmorphone (PF) (DILAUDID) injection 1 mg  1 mg IntraVENous Q3H PRN    enoxaparin (LOVENOX) injection 40 mg  40 mg SubCUTAneous Q24H    dextrose 5% - 0.45% NaCl with KCl 20 mEq/L infusion  100 mL/hr IntraVENous CONTINUOUS        Review of Systems  A comprehensive review of systems was negative. Objective:     Patient Vitals for the past 24 hrs:   BP Temp Pulse Resp SpO2   09/12/19 2001 138/82 99.5 °F (37.5 °C) 91 18 98 %   09/12/19 1533 126/76 98.2 °F (36.8 °C) 84 17 99 %   09/12/19 1131 131/74 98 °F (36.7 °C) 87 17 98 %   09/12/19 0716 119/83 99.1 °F (37.3 °C) 82 16 96 %   09/12/19 0400 116/69 98.5 °F (36.9 °C) 82 16 99 %   09/11/19 2359 130/64 98.6 °F (37 °C) 82 16 99 %     No intake/output data recorded. 09/11 0701 - 09/12 1900  In: 4606.6 [P.O.:1200; I.V.:3406.6]  Out: 900 [Urine:900]    Physical Exam:   Visit Vitals  /82   Pulse 91   Temp 99.5 °F (37.5 °C)   Resp 18   Ht 5' 10\" (1.778 m)   Wt 202 lb 13.2 oz (92 kg)   SpO2 98% Comment: on 2L   Breastfeeding?  No   BMI 29.10 kg/m²     General appearance: alert, cooperative, no distress, appears stated age  Neck: supple, symmetrical, trachea midline, no adenopathy, thyroid: not enlarged, symmetric, no tenderness/mass/nodules, no carotid bruit and no JVD  Lungs: clear to auscultation bilaterally  Heart: regular rate and rhythm, S1, S2 normal, no murmur, click, rub or gallop  Abdomen: soft, non-tender. Bowel sounds normal. No masses,  no organomegaly  Extremities: extremities normal, atraumatic, no cyanosis or edema        Data Review No results found for this or any previous visit (from the past 24 hour(s)). Assessment:     Principal Problem:    Sickle cell pain crisis ([facility] [address] (9/8/2018)    Active Problems:    Hypertension (10/3/2014)      Venous insufficiency (10/3/2014)      Depression (10/5/2014)        Plan: 1. Cont treatment of painful crisis.

## 2019-09-13 NOTE — PROGRESS NOTES
KRISTINA Plan:                 *Home with family     Clinical status not progressing. Patient continues to be treated for pain, due to sickle crisis.   CM will continue to follow    Jackelin Kaur  Ext [phone]

## 2019-09-13 NOTE — PROGRESS NOTES
Problem: Falls - Risk of  Goal: *Absence of Falls  Description  Document Milta Raring Fall Risk and appropriate interventions in the flowsheet. Outcome: Progressing Towards Goal  Note:   Fall Risk Interventions:  Mobility Interventions: Strengthening exercises (ROM-active/passive)         Medication Interventions: Teach patient to arise slowly    Elimination Interventions: Patient to call for help with toileting needs              Problem: Patient Education: Go to Patient Education Activity  Goal: Patient/Family Education  Outcome: Progressing Towards Goal     Problem: Pain  Goal: *Control of Pain  Outcome: Progressing Towards Goal  Goal: *PALLIATIVE CARE:  Alleviation of Pain  Outcome: Progressing Towards Goal     Problem: Patient Education: Go to Patient Education Activity  Goal: Patient/Family Education  Outcome: Progressing Towards Goal     Problem: Constipation - Risk of  Goal: *Prevention of constipation  Outcome: Progressing Towards Goal  Goal: *PALLIATIVE CARE:  Prevention/Alleviation of constipation  Outcome: Progressing Towards Goal     Problem: Patient Education: Go to Patient Education Activity  Goal: Patient/Family Education  Outcome: Progressing Towards Goal     Problem: Pressure Injury - Risk of  Goal: *Prevention of pressure injury  Description  Document Nuno Scale and appropriate interventions in the flowsheet.   Outcome: Progressing Towards Goal  Note:   Pressure Injury Interventions:                      Nutrition Interventions: Document food/fluid/supplement intake                     Problem: Patient Education: Go to Patient Education Activity  Goal: Patient/Family Education  Outcome: Progressing Towards Goal

## 2019-09-13 NOTE — PROGRESS NOTES
General Daily Progress Note    Admit Date: 9/6/2019    Subjective:     Patient cont to c/o pain. Current Facility-Administered Medications   Medication Dose Route Frequency    pantoprazole (PROTONIX) tablet 40 mg  40 mg Oral ACB    ondansetron (ZOFRAN ODT) tablet 4 mg  4 mg Oral Q6H PRN    polyethylene glycol (MIRALAX) packet 17 g  17 g Oral BID    magnesium hydroxide (MILK OF MAGNESIA) 400 mg/5 mL oral suspension 30 mL  30 mL Oral DAILY PRN    sodium chloride (NS) flush 5-40 mL  5-40 mL IntraVENous Q8H    sodium chloride (NS) flush 5-40 mL  5-40 mL IntraVENous PRN    acetaminophen (TYLENOL) tablet 650 mg  650 mg Oral Q4H PRN    ondansetron (ZOFRAN) injection 4 mg  4 mg IntraVENous Q4H PRN    diphenhydrAMINE (BENADRYL) injection 12.5 mg  12.5 mg IntraVENous Q4H PRN    HYDROmorphone (PF) (DILAUDID) injection 1 mg  1 mg IntraVENous Q3H PRN    enoxaparin (LOVENOX) injection 40 mg  40 mg SubCUTAneous Q24H    dextrose 5% - 0.45% NaCl with KCl 20 mEq/L infusion  100 mL/hr IntraVENous CONTINUOUS        Review of Systems  A comprehensive review of systems was negative. Objective:     Patient Vitals for the past 24 hrs:   BP Temp Pulse Resp SpO2   09/13/19 0830 143/79 98.6 °F (37 °C) 75 18 99 %   09/13/19 0406 123/90 98.4 °F (36.9 °C) 74 18 99 %   09/12/19 2001 138/82 99.5 °F (37.5 °C) 91 18 98 %   09/12/19 1533 126/76 98.2 °F (36.8 °C) 84 17 99 %   09/12/19 1131 131/74 98 °F (36.7 °C) 87 17 98 %     No intake/output data recorded. 09/11 1901 - 09/13 0700  In: 2406.6 [P.O.:960; I.V.:1446.6]  Out: -     Physical Exam:   Visit Vitals  /79   Pulse 75   Temp 98.6 °F (37 °C)   Resp 18   Ht 5' 10\" (1.778 m)   Wt 202 lb 13.2 oz (92 kg)   SpO2 99%   Breastfeeding?  No   BMI 29.10 kg/m²     General appearance: alert, cooperative, no distress, appears stated age  Neck: supple, symmetrical, trachea midline, no adenopathy, thyroid: not enlarged, symmetric, no tenderness/mass/nodules, no carotid bruit and no JVD  Lungs: clear to auscultation bilaterally  Heart: regular rate and rhythm, S1, S2 normal, no murmur, click, rub or gallop  Abdomen: soft, non-tender. Bowel sounds normal. No masses,  no organomegaly  Extremities: extremities normal, atraumatic, no cyanosis or edema        Data Review   Recent Results (from the past 24 hour(s))   CBC WITH MANUAL DIFF    Collection Time: 09/13/19 12:50 AM   Result Value Ref Range    WBC 8.6 3.6 - 11.0 K/uL    RBC 2.28 (L) 3.80 - 5.20 M/uL    HGB 7.5 (L) 11.5 - 16.0 g/dL    HCT 21.8 (L) 35.0 - 47.0 %    MCV 95.6 80.0 - 99.0 FL    MCH 32.9 26.0 - 34.0 PG    MCHC 34.4 30.0 - 36.5 g/dL    RDW 15.8 (H) 11.5 - 14.5 %    PLATELET 190 900 - 128 K/uL    NRBC 0.8 (H) 0  WBC    ABSOLUTE NRBC 0.07 (H) 0.00 - 0.01 K/uL    NEUTROPHILS 25 (L) 32 - 75 %    BAND NEUTROPHILS 0 0 - 6 %    LYMPHOCYTES 58 (H) 12 - 49 %    MONOCYTES 12 5 - 13 %    EOSINOPHILS 5 0 - 7 %    BASOPHILS 0 0 - 1 %    METAMYELOCYTES 0 0 %    MYELOCYTES 0 0 %    PROMYELOCYTES 0 0 %    BLASTS 0 0 %    OTHER CELL 0 0      IMMATURE GRANULOCYTES 0 %    ABS. NEUTROPHILS 2.2 1.8 - 8.0 K/UL    ABS. LYMPHOCYTES 5.0 (H) 0.8 - 3.5 K/UL    ABS. MONOCYTES 1.0 0.0 - 1.0 K/UL    ABS. EOSINOPHILS 0.4 0.0 - 0.4 K/UL    ABS. BASOPHILS 0.0 0.0 - 0.1 K/UL    ABS. IMM. GRANS. 0.0 K/UL    DF MANUAL      RBC COMMENTS ANISOCYTOSIS  1+        RBC COMMENTS TARGET CELLS  PRESENT               Assessment:     Principal Problem:    Sickle cell pain crisis ([facility] [address] (9/8/2018)    Active Problems:    Hypertension (10/3/2014)      Venous insufficiency (10/3/2014)      Depression (10/5/2014)        Plan: 1. Cont treatment of painful crisis. 2.Hydration conts.

## 2019-09-13 NOTE — PROGRESS NOTES
Nutrition Assessment:    RECOMMENDATIONS:   Continue Regular diet as tolerated     DIETITIANS INTERVENTIONS/PLAN:   Continue diet as tolerated  Monitor appetite/PO intake    ASSESSMENT:   Pt admitted with sickle cell pain crisis. PMH: HTN. Chart reviewed for LOS. Pt's appetite has been excellent. Current intake is likely adequate to meet est needs. Labs reviewed, WNL. BM noted on 9/11. SUBJECTIVE/OBJECTIVE:     Diet Order: Regular  % Eaten:    Patient Vitals for the past 72 hrs:   % Diet Eaten   09/13/19 1447 100 %   09/13/19 0930 100 %   09/12/19 1716 100 %     Pertinent Medications:protonix, miralax; IVF(D5, [email or web address]). Chemistries:  Lab Results   Component Value Date/Time    Sodium 140 09/11/2019 04:43 AM    Potassium 4.2 09/11/2019 04:43 AM    Chloride 105 09/11/2019 04:43 AM    CO2 34 (H) 09/11/2019 04:43 AM    Anion gap 1 (L) 09/11/2019 04:43 AM    Glucose 90 09/11/2019 04:43 AM    BUN 6 09/11/2019 04:43 AM    Creatinine 0.84 09/11/2019 04:43 AM    BUN/Creatinine ratio 7 (L) 09/11/2019 04:43 AM    GFR est AA >60 09/11/2019 04:43 AM    GFR est non-AA >60 09/11/2019 04:43 AM    Calcium 8.2 (L) 09/11/2019 04:43 AM    AST (SGOT) 31 09/11/2019 04:43 AM    Alk. phosphatase 115 09/11/2019 04:43 AM    Protein, total 7.0 09/11/2019 04:43 AM    Albumin 3.1 (L) 09/11/2019 04:43 AM    Globulin 3.9 09/11/2019 04:43 AM    A-G Ratio 0.8 (L) 09/11/2019 04:43 AM    ALT (SGPT) 20 09/11/2019 04:43 AM      Anthropometrics: Height: 5' 10\" (177.8 cm) Weight: 92 kg (202 lb 13.2 oz)  [x]bed scale/standing (9/6)   []stated   []unknown    IBW (%IBW):   ( ) UBW (%UBW):   (  %)    BMI: Body mass index is 29.1 kg/m². This BMI is indicative of:  []Underweight   []Normal   [x]Overweight   [] Obesity   [] Extreme Obesity (BMI>40)  Estimated Nutrition Needs (Based on): 1908 Kcals/day(MSJ 1590 x 1.2) , 74 g(0.8gPro/kg) Protein  Carbohydrate:  At Least 130 g/day  Fluids: 2000 mL/day    Last BM: 9/11   [x]Active []Hyperactive  []Hypoactive       [] Absent   BS  Skin:    [x] Intact   [] Incision  [] Breakdown   [] DTI   [] Tears/Excoriation/Abrasion  []Edema [] Other: Wt Readings from Last 30 Encounters:   09/06/19 92 kg (202 lb 13.2 oz)   08/12/19 95 kg (209 lb 6.4 oz)   07/27/19 89.4 kg (197 lb 1.5 oz)   07/26/19 90.6 kg (199 lb 12.8 oz)   06/25/19 89.8 kg (197 lb 14.4 oz)   06/14/19 89.3 kg (196 lb 14.4 oz)   05/26/19 88 kg (194 lb 0.1 oz)   05/25/19 88 kg (194 lb 0.1 oz)   05/09/19 87.6 kg (193 lb 3.2 oz)   04/14/19 86.1 kg (189 lb 13.1 oz)   04/04/19 88.5 kg (195 lb 3.2 oz)   03/07/19 89.4 kg (197 lb 3.2 oz)   02/16/19 80.6 kg (177 lb 11.1 oz)   02/07/19 82.2 kg (181 lb 4.8 oz)   01/19/19 79.8 kg (175 lb 14.8 oz)   01/10/19 82.9 kg (182 lb 12.8 oz)   12/12/18 77 kg (169 lb 12.1 oz)   11/13/18 89.4 kg (197 lb 1.6 oz)   10/29/18 77.4 kg (170 lb 10.2 oz)   10/02/18 91.5 kg (201 lb 12.8 oz)   09/24/18 86.3 kg (190 lb 3.2 oz)   09/07/18 83.9 kg (185 lb)   07/22/18 85.7 kg (188 lb 15 oz)   07/09/18 86.6 kg (191 lb)   06/12/18 88.7 kg (195 lb 8 oz)   04/09/18 86 kg (189 lb 8 oz)   03/13/18 84.9 kg (187 lb 3.2 oz)   01/29/18 87.2 kg (192 lb 4.8 oz)   01/05/18 86.4 kg (190 lb 7.6 oz)   01/04/18 86 kg (189 lb 9.5 oz)      NUTRITION DIAGNOSES:   Problem:  No nutritional diagnosis at this time        NUTRITION INTERVENTIONS:  Meals/Snacks: General/healthful diet                  GOAL:   Pt will consume >70% of meals in 5-7 days.      Cultural, Hinduism, or Ethnic Dietary Needs: None     LEARNING NEEDS (Diet, Food/Nutrient-Drug Interaction):    [x] None Identified   [] Identified and Education Provided/Documented   [] Identified and Pt declined/was not appropriate      [x] Interdisciplinary Care Plan Reviewed/Documented    [x] Participated in Discharge Planning: Regular diet    [] Interdisciplinary Rounds     NUTRITION RISK:    [] High              [] Moderate           []  Low  [x]  Minimal/Uncompromised    PT SEEN FOR:    []  MD Consult: []Calorie Count      []Diabetic Diet Education        []Diet Education     []Electrolyte Management     []General Nutrition Management and Supplements     []Management of Tube Feeding     []TPN Recommendations    []  RN Referral:  []MST score >=2     []Enteral/Parenteral Nutrition PTA     []Pregnant: Gestational DM or Multigestation   []  Low BMI  []  Re-Screen   [x]  LOS   []  NPO/clears x 5 days   []  New TF/TPN    Artist SUDHIR Higuera, [address]   Pager [phone]  Weekend Pager [phone]

## 2019-09-14 PROCEDURE — 74011250637 HC RX REV CODE- 250/637: Performed by: INTERNAL MEDICINE

## 2019-09-14 PROCEDURE — 77010033678 HC OXYGEN DAILY

## 2019-09-14 PROCEDURE — 94760 N-INVAS EAR/PLS OXIMETRY 1: CPT

## 2019-09-14 PROCEDURE — 74011250636 HC RX REV CODE- 250/636: Performed by: INTERNAL MEDICINE

## 2019-09-14 PROCEDURE — 65270000029 HC RM PRIVATE

## 2019-09-14 PROCEDURE — 74011250636 HC RX REV CODE- 250/636: Performed by: HOSPITALIST

## 2019-09-14 RX ADMIN — DIPHENHYDRAMINE HYDROCHLORIDE 12.5 MG: 50 INJECTION, SOLUTION INTRAMUSCULAR; INTRAVENOUS at 20:10

## 2019-09-14 RX ADMIN — HYDROMORPHONE HYDROCHLORIDE 1 MG: 2 INJECTION, SOLUTION INTRAMUSCULAR; INTRAVENOUS; SUBCUTANEOUS at 14:08

## 2019-09-14 RX ADMIN — DIPHENHYDRAMINE HYDROCHLORIDE 12.5 MG: 50 INJECTION, SOLUTION INTRAMUSCULAR; INTRAVENOUS at 15:31

## 2019-09-14 RX ADMIN — HYDROMORPHONE HYDROCHLORIDE 1 MG: 2 INJECTION, SOLUTION INTRAMUSCULAR; INTRAVENOUS; SUBCUTANEOUS at 07:47

## 2019-09-14 RX ADMIN — ONDANSETRON 4 MG: 2 INJECTION INTRAMUSCULAR; INTRAVENOUS at 20:09

## 2019-09-14 RX ADMIN — Medication 10 ML: at 21:29

## 2019-09-14 RX ADMIN — PANTOPRAZOLE SODIUM 40 MG: 40 TABLET, DELAYED RELEASE ORAL at 06:58

## 2019-09-14 RX ADMIN — ENOXAPARIN SODIUM 40 MG: 40 INJECTION, SOLUTION INTRAVENOUS; SUBCUTANEOUS at 17:20

## 2019-09-14 RX ADMIN — DEXTROSE MONOHYDRATE, SODIUM CHLORIDE, AND POTASSIUM CHLORIDE 100 ML/HR: 50; 4.5; 1.49 INJECTION, SOLUTION INTRAVENOUS at 02:59

## 2019-09-14 RX ADMIN — HYDROMORPHONE HYDROCHLORIDE 1 MG: 2 INJECTION, SOLUTION INTRAMUSCULAR; INTRAVENOUS; SUBCUTANEOUS at 20:10

## 2019-09-14 RX ADMIN — DEXTROSE MONOHYDRATE, SODIUM CHLORIDE, AND POTASSIUM CHLORIDE 100 ML/HR: 50; 4.5; 1.49 INJECTION, SOLUTION INTRAVENOUS at 23:01

## 2019-09-14 RX ADMIN — DIPHENHYDRAMINE HYDROCHLORIDE 12.5 MG: 50 INJECTION, SOLUTION INTRAMUSCULAR; INTRAVENOUS at 06:58

## 2019-09-14 RX ADMIN — ONDANSETRON 4 MG: 2 INJECTION INTRAMUSCULAR; INTRAVENOUS at 15:31

## 2019-09-14 RX ADMIN — Medication 10 ML: at 04:13

## 2019-09-14 RX ADMIN — ONDANSETRON 4 MG: 2 INJECTION INTRAMUSCULAR; INTRAVENOUS at 06:58

## 2019-09-14 RX ADMIN — HYDROMORPHONE HYDROCHLORIDE 1 MG: 2 INJECTION, SOLUTION INTRAMUSCULAR; INTRAVENOUS; SUBCUTANEOUS at 23:07

## 2019-09-14 RX ADMIN — HYDROMORPHONE HYDROCHLORIDE 1 MG: 2 INJECTION, SOLUTION INTRAMUSCULAR; INTRAVENOUS; SUBCUTANEOUS at 11:13

## 2019-09-14 RX ADMIN — ONDANSETRON 4 MG: 2 INJECTION INTRAMUSCULAR; INTRAVENOUS at 02:59

## 2019-09-14 RX ADMIN — POLYETHYLENE GLYCOL 3350 17 G: 17 POWDER, FOR SOLUTION ORAL at 10:21

## 2019-09-14 RX ADMIN — HYDROMORPHONE HYDROCHLORIDE 1 MG: 2 INJECTION, SOLUTION INTRAMUSCULAR; INTRAVENOUS; SUBCUTANEOUS at 01:09

## 2019-09-14 RX ADMIN — HYDROMORPHONE HYDROCHLORIDE 1 MG: 2 INJECTION, SOLUTION INTRAMUSCULAR; INTRAVENOUS; SUBCUTANEOUS at 04:11

## 2019-09-14 RX ADMIN — ONDANSETRON 4 MG: 4 TABLET, ORALLY DISINTEGRATING ORAL at 11:12

## 2019-09-14 RX ADMIN — DIPHENHYDRAMINE HYDROCHLORIDE 12.5 MG: 50 INJECTION, SOLUTION INTRAMUSCULAR; INTRAVENOUS at 02:59

## 2019-09-14 RX ADMIN — HYDROMORPHONE HYDROCHLORIDE 1 MG: 2 INJECTION, SOLUTION INTRAMUSCULAR; INTRAVENOUS; SUBCUTANEOUS at 17:20

## 2019-09-14 RX ADMIN — Medication 10 ML: at 14:08

## 2019-09-14 RX ADMIN — DIPHENHYDRAMINE HYDROCHLORIDE 12.5 MG: 50 INJECTION, SOLUTION INTRAMUSCULAR; INTRAVENOUS at 11:12

## 2019-09-14 NOTE — ROUTINE PROCESS
Bedside and Verbal shift change report given to Ana Castillo RN  (oncoming nurse) by [facility], RN  (offgoing nurse). Report included the following information SBAR, Kardex, Intake/Output, MAR, Accordion and Recent Results.

## 2019-09-14 NOTE — PROGRESS NOTES
General Surgery End of Shift Nursing Note    Bedside shift change report given to [address] (oncoming nurse) by [facility] (offgoing nurse). Report included the following information SBAR, Intake/Output and MAR. Shift worked:   [id number]   Summary of shift:    Pain managed overnight with PRN Dilaudid q3 hours. Continuous pulse ox % overnight with patient on 2LNC.     Issues for physician to address:   -       Maylene Baumgarten

## 2019-09-14 NOTE — PROGRESS NOTES
General Daily Progress Note    Admit Date: 9/6/2019    Subjective:     Patient c/o pain. Current Facility-Administered Medications   Medication Dose Route Frequency    pantoprazole (PROTONIX) tablet 40 mg  40 mg Oral ACB    ondansetron (ZOFRAN ODT) tablet 4 mg  4 mg Oral Q6H PRN    polyethylene glycol (MIRALAX) packet 17 g  17 g Oral BID    magnesium hydroxide (MILK OF MAGNESIA) 400 mg/5 mL oral suspension 30 mL  30 mL Oral DAILY PRN    sodium chloride (NS) flush 5-40 mL  5-40 mL IntraVENous Q8H    sodium chloride (NS) flush 5-40 mL  5-40 mL IntraVENous PRN    acetaminophen (TYLENOL) tablet 650 mg  650 mg Oral Q4H PRN    ondansetron (ZOFRAN) injection 4 mg  4 mg IntraVENous Q4H PRN    diphenhydrAMINE (BENADRYL) injection 12.5 mg  12.5 mg IntraVENous Q4H PRN    HYDROmorphone (PF) (DILAUDID) injection 1 mg  1 mg IntraVENous Q3H PRN    enoxaparin (LOVENOX) injection 40 mg  40 mg SubCUTAneous Q24H    dextrose 5% - 0.45% NaCl with KCl 20 mEq/L infusion  100 mL/hr IntraVENous CONTINUOUS        Review of Systems  A comprehensive review of systems was negative. Objective:     Patient Vitals for the past 24 hrs:   BP Temp Pulse Resp SpO2   09/14/19 1501 103/78 98.8 °F (37.1 °C) 81 16 98 %   09/14/19 1131 126/75 98.9 °F (37.2 °C) 81 16 100 %   09/14/19 0747 134/75 98.7 °F (37.1 °C) 84 16 100 %   09/14/19 0226 125/68 99 °F (37.2 °C) 78 18 98 %   09/13/19 2227 132/71 98.4 °F (36.9 °C) 78 18 99 %   09/13/19 1852 135/70 99.3 °F (37.4 °C) 94 18 99 %   09/13/19 1644 127/76 99.2 °F (37.3 °C) 86 18 97 %     No intake/output data recorded. 09/12 1901 - 09/14 0700  In: 4971.5 [P.O.:1520; I.V.:3451.5]  Out: 400 [Urine:400]    Physical Exam:   Visit Vitals  /78 (BP Patient Position: At rest)   Pulse 81   Temp 98.8 °F (37.1 °C)   Resp 16   Ht 5' 10\" (1.778 m)   Wt 202 lb 13.2 oz (92 kg)   SpO2 98%   Breastfeeding?  No   BMI 29.10 kg/m²     General appearance: alert, cooperative, no distress, appears stated age  Neck: supple, symmetrical, trachea midline, no adenopathy, thyroid: not enlarged, symmetric, no tenderness/mass/nodules, no carotid bruit and no JVD  Lungs: clear to auscultation bilaterally  Heart: regular rate and rhythm, S1, S2 normal, no murmur, click, rub or gallop  Abdomen: soft, non-tender. Bowel sounds normal. No masses,  no organomegaly  Extremities: extremities normal, atraumatic, no cyanosis or edema        Data Review No results found for this or any previous visit (from the past 24 hour(s)). Assessment:     Principal Problem:    Sickle cell pain crisis ([facility] [address] (9/8/2018)    Active Problems:    Hypertension (10/3/2014)      Venous insufficiency (10/3/2014)      Depression (10/5/2014)        Plan: 1. Cont treatment of painful crisis which is improving.

## 2019-09-15 PROCEDURE — 74011250636 HC RX REV CODE- 250/636: Performed by: HOSPITALIST

## 2019-09-15 PROCEDURE — 77010033678 HC OXYGEN DAILY

## 2019-09-15 PROCEDURE — 94760 N-INVAS EAR/PLS OXIMETRY 1: CPT

## 2019-09-15 PROCEDURE — 74011250637 HC RX REV CODE- 250/637: Performed by: INTERNAL MEDICINE

## 2019-09-15 PROCEDURE — 65270000029 HC RM PRIVATE

## 2019-09-15 PROCEDURE — 74011250636 HC RX REV CODE- 250/636: Performed by: INTERNAL MEDICINE

## 2019-09-15 RX ORDER — HYDROCODONE BITARTRATE AND ACETAMINOPHEN 10; 325 MG/1; MG/1
1 TABLET ORAL
Status: DISCONTINUED | OUTPATIENT
Start: 2019-09-15 | End: 2019-09-20 | Stop reason: HOSPADM

## 2019-09-15 RX ORDER — HYDROMORPHONE HYDROCHLORIDE 1 MG/ML
0.5 INJECTION, SOLUTION INTRAMUSCULAR; INTRAVENOUS; SUBCUTANEOUS
Status: DISCONTINUED | OUTPATIENT
Start: 2019-09-15 | End: 2019-09-18

## 2019-09-15 RX ADMIN — DIPHENHYDRAMINE HYDROCHLORIDE 12.5 MG: 50 INJECTION, SOLUTION INTRAMUSCULAR; INTRAVENOUS at 00:10

## 2019-09-15 RX ADMIN — ONDANSETRON 4 MG: 2 INJECTION INTRAMUSCULAR; INTRAVENOUS at 04:40

## 2019-09-15 RX ADMIN — PANTOPRAZOLE SODIUM 40 MG: 40 TABLET, DELAYED RELEASE ORAL at 06:40

## 2019-09-15 RX ADMIN — HYDROMORPHONE HYDROCHLORIDE 1 MG: 2 INJECTION, SOLUTION INTRAMUSCULAR; INTRAVENOUS; SUBCUTANEOUS at 07:46

## 2019-09-15 RX ADMIN — HYDROMORPHONE HYDROCHLORIDE 1 MG: 2 INJECTION, SOLUTION INTRAMUSCULAR; INTRAVENOUS; SUBCUTANEOUS at 20:27

## 2019-09-15 RX ADMIN — HYDROMORPHONE HYDROCHLORIDE 1 MG: 2 INJECTION, SOLUTION INTRAMUSCULAR; INTRAVENOUS; SUBCUTANEOUS at 01:48

## 2019-09-15 RX ADMIN — DEXTROSE MONOHYDRATE, SODIUM CHLORIDE, AND POTASSIUM CHLORIDE 100 ML/HR: 50; 4.5; 1.49 INJECTION, SOLUTION INTRAVENOUS at 09:29

## 2019-09-15 RX ADMIN — DIPHENHYDRAMINE HYDROCHLORIDE 12.5 MG: 50 INJECTION, SOLUTION INTRAMUSCULAR; INTRAVENOUS at 04:42

## 2019-09-15 RX ADMIN — Medication 10 ML: at 20:26

## 2019-09-15 RX ADMIN — DIPHENHYDRAMINE HYDROCHLORIDE 12.5 MG: 50 INJECTION, SOLUTION INTRAMUSCULAR; INTRAVENOUS at 20:29

## 2019-09-15 RX ADMIN — ONDANSETRON 4 MG: 2 INJECTION INTRAMUSCULAR; INTRAVENOUS at 20:29

## 2019-09-15 RX ADMIN — ONDANSETRON 4 MG: 2 INJECTION INTRAMUSCULAR; INTRAVENOUS at 16:28

## 2019-09-15 RX ADMIN — POLYETHYLENE GLYCOL 3350 17 G: 17 POWDER, FOR SOLUTION ORAL at 09:29

## 2019-09-15 RX ADMIN — ONDANSETRON 4 MG: 2 INJECTION INTRAMUSCULAR; INTRAVENOUS at 09:29

## 2019-09-15 RX ADMIN — HYDROMORPHONE HYDROCHLORIDE 1 MG: 2 INJECTION, SOLUTION INTRAMUSCULAR; INTRAVENOUS; SUBCUTANEOUS at 16:28

## 2019-09-15 RX ADMIN — ENOXAPARIN SODIUM 40 MG: 40 INJECTION, SOLUTION INTRAVENOUS; SUBCUTANEOUS at 17:43

## 2019-09-15 RX ADMIN — DIPHENHYDRAMINE HYDROCHLORIDE 12.5 MG: 50 INJECTION, SOLUTION INTRAMUSCULAR; INTRAVENOUS at 09:29

## 2019-09-15 RX ADMIN — HYDROMORPHONE HYDROCHLORIDE 1 MG: 2 INJECTION, SOLUTION INTRAMUSCULAR; INTRAVENOUS; SUBCUTANEOUS at 12:15

## 2019-09-15 RX ADMIN — Medication 10 ML: at 16:31

## 2019-09-15 RX ADMIN — ONDANSETRON 4 MG: 2 INJECTION INTRAMUSCULAR; INTRAVENOUS at 00:10

## 2019-09-15 RX ADMIN — DIPHENHYDRAMINE HYDROCHLORIDE 12.5 MG: 50 INJECTION, SOLUTION INTRAMUSCULAR; INTRAVENOUS at 16:31

## 2019-09-15 RX ADMIN — DEXTROSE MONOHYDRATE, SODIUM CHLORIDE, AND POTASSIUM CHLORIDE 100 ML/HR: 50; 4.5; 1.49 INJECTION, SOLUTION INTRAVENOUS at 19:09

## 2019-09-15 RX ADMIN — HYDROMORPHONE HYDROCHLORIDE 1 MG: 2 INJECTION, SOLUTION INTRAMUSCULAR; INTRAVENOUS; SUBCUTANEOUS at 04:42

## 2019-09-15 RX ADMIN — ONDANSETRON 4 MG: 2 INJECTION INTRAMUSCULAR; INTRAVENOUS at 12:38

## 2019-09-15 RX ADMIN — DIPHENHYDRAMINE HYDROCHLORIDE 12.5 MG: 50 INJECTION, SOLUTION INTRAMUSCULAR; INTRAVENOUS at 12:38

## 2019-09-15 NOTE — PROGRESS NOTES
General Surgery End of Shift Nursing Note    Bedside shift change report given to Delfino Pathak4 (oncoming nurse) by Sunny Berger RN (offgoing nurse). Report included the following information SBAR. Shift worked:   7am-7pm   Summary of shift:    Patient in bed this morning resting quietly    Continues to require dilaudid for pain, benadry and zofran       Issues for physician to address:        Number times ambulated in hallway past shift: 2    Number of times OOB to chair past shift: 0 sits up in bed all day    Pain Management:  Current medication: dilaudid  Patient states pain is manageable on current pain medication: YES    GI:    Current diet:  DIET REGULAR    Tolerating current diet: YES  Passing flatus: NO  Last Bowel Movement: several days ago   Appearance:     Respiratory:    Incentive Spirometer at bedside: YES  Patient instructed on use: YES    Patient Safety:    Falls Score: 1  Bed Alarm On? Not applicable  Sitter?  Not applicable    Sunny Berger

## 2019-09-15 NOTE — PROGRESS NOTES
General Surgery End of Shift Nursing Note    Bedside shift change report given to Radhika North RN (oncoming nurse) by Pee Waller RN (offgoing nurse). Report included the following information SBAR, Kardex, Procedure Summary, Intake/Output and Quality Measures. Shift worked:   7p-7a   Summary of shift:    PAtient complains of pain r/t sickle cell. She requires pain/nausea/itching meds q 3-4 hours. She ambulates to the bathroom to urinate. Her )2 sats remain at 96-97% on 3 L N/C   Issues for physician to address:   Continued care       Number times ambulated in hallway past shift: 0    Number of times OOB to chair past shift: 3    Pain Management:  Current medication:Dilaudid  Patient states pain is manageable on current pain medication: YES    GI:    Current diet:  DIET REGULAR    Tolerating current diet: YES  Passing flatus: YES  Last Bowel Movement: day beforeyesterday   Appearance: unk    Respiratory:    Incentive Spirometer at bedside: YES  Patient instructed on use: YES    Patient Safety:    Falls Score: 2  Bed Alarm On? No  Sitter?  No    Krys Fowler RN

## 2019-09-16 PROCEDURE — 94760 N-INVAS EAR/PLS OXIMETRY 1: CPT

## 2019-09-16 PROCEDURE — 74011250637 HC RX REV CODE- 250/637: Performed by: INTERNAL MEDICINE

## 2019-09-16 PROCEDURE — 74011250636 HC RX REV CODE- 250/636: Performed by: INTERNAL MEDICINE

## 2019-09-16 PROCEDURE — 65270000029 HC RM PRIVATE

## 2019-09-16 PROCEDURE — 74011250636 HC RX REV CODE- 250/636: Performed by: HOSPITALIST

## 2019-09-16 PROCEDURE — 77010033678 HC OXYGEN DAILY

## 2019-09-16 RX ORDER — HYDROXYUREA 500 MG/1
500 CAPSULE ORAL DAILY
Status: DISCONTINUED | OUTPATIENT
Start: 2019-09-16 | End: 2019-09-20 | Stop reason: HOSPADM

## 2019-09-16 RX ADMIN — HYDROMORPHONE HYDROCHLORIDE 0.5 MG: 1 INJECTION, SOLUTION INTRAMUSCULAR; INTRAVENOUS; SUBCUTANEOUS at 18:57

## 2019-09-16 RX ADMIN — HYDROMORPHONE HYDROCHLORIDE 0.5 MG: 1 INJECTION, SOLUTION INTRAMUSCULAR; INTRAVENOUS; SUBCUTANEOUS at 23:17

## 2019-09-16 RX ADMIN — ENOXAPARIN SODIUM 40 MG: 40 INJECTION, SOLUTION INTRAVENOUS; SUBCUTANEOUS at 17:31

## 2019-09-16 RX ADMIN — ONDANSETRON 4 MG: 2 INJECTION INTRAMUSCULAR; INTRAVENOUS at 18:56

## 2019-09-16 RX ADMIN — DIPHENHYDRAMINE HYDROCHLORIDE 12.5 MG: 50 INJECTION, SOLUTION INTRAMUSCULAR; INTRAVENOUS at 01:01

## 2019-09-16 RX ADMIN — HYDROXYUREA 500 MG: 500 CAPSULE ORAL at 14:21

## 2019-09-16 RX ADMIN — ONDANSETRON 4 MG: 2 INJECTION INTRAMUSCULAR; INTRAVENOUS at 10:36

## 2019-09-16 RX ADMIN — ONDANSETRON 4 MG: 2 INJECTION INTRAMUSCULAR; INTRAVENOUS at 14:42

## 2019-09-16 RX ADMIN — Medication 10 ML: at 17:31

## 2019-09-16 RX ADMIN — DIPHENHYDRAMINE HYDROCHLORIDE 12.5 MG: 50 INJECTION, SOLUTION INTRAMUSCULAR; INTRAVENOUS at 23:17

## 2019-09-16 RX ADMIN — PANTOPRAZOLE SODIUM 40 MG: 40 TABLET, DELAYED RELEASE ORAL at 06:04

## 2019-09-16 RX ADMIN — DIPHENHYDRAMINE HYDROCHLORIDE 12.5 MG: 50 INJECTION, SOLUTION INTRAMUSCULAR; INTRAVENOUS at 14:42

## 2019-09-16 RX ADMIN — POLYETHYLENE GLYCOL 3350 17 G: 17 POWDER, FOR SOLUTION ORAL at 08:35

## 2019-09-16 RX ADMIN — HYDROMORPHONE HYDROCHLORIDE 0.5 MG: 1 INJECTION, SOLUTION INTRAMUSCULAR; INTRAVENOUS; SUBCUTANEOUS at 01:01

## 2019-09-16 RX ADMIN — DIPHENHYDRAMINE HYDROCHLORIDE 12.5 MG: 50 INJECTION, SOLUTION INTRAMUSCULAR; INTRAVENOUS at 10:36

## 2019-09-16 RX ADMIN — DIPHENHYDRAMINE HYDROCHLORIDE 12.5 MG: 50 INJECTION, SOLUTION INTRAMUSCULAR; INTRAVENOUS at 06:01

## 2019-09-16 RX ADMIN — HYDROMORPHONE HYDROCHLORIDE 0.5 MG: 1 INJECTION, SOLUTION INTRAMUSCULAR; INTRAVENOUS; SUBCUTANEOUS at 14:42

## 2019-09-16 RX ADMIN — HYDROMORPHONE HYDROCHLORIDE 0.5 MG: 1 INJECTION, SOLUTION INTRAMUSCULAR; INTRAVENOUS; SUBCUTANEOUS at 06:02

## 2019-09-16 RX ADMIN — ONDANSETRON 4 MG: 2 INJECTION INTRAMUSCULAR; INTRAVENOUS at 23:17

## 2019-09-16 RX ADMIN — ONDANSETRON 4 MG: 2 INJECTION INTRAMUSCULAR; INTRAVENOUS at 06:01

## 2019-09-16 RX ADMIN — HYDROMORPHONE HYDROCHLORIDE 0.5 MG: 1 INJECTION, SOLUTION INTRAMUSCULAR; INTRAVENOUS; SUBCUTANEOUS at 10:36

## 2019-09-16 RX ADMIN — DIPHENHYDRAMINE HYDROCHLORIDE 12.5 MG: 50 INJECTION, SOLUTION INTRAMUSCULAR; INTRAVENOUS at 18:56

## 2019-09-16 RX ADMIN — Medication 10 ML: at 23:18

## 2019-09-16 RX ADMIN — Medication 10 ML: at 06:01

## 2019-09-16 RX ADMIN — DEXTROSE MONOHYDRATE, SODIUM CHLORIDE, AND POTASSIUM CHLORIDE 50 ML/HR: 50; 4.5; 1.49 INJECTION, SOLUTION INTRAVENOUS at 10:35

## 2019-09-16 RX ADMIN — ONDANSETRON 4 MG: 2 INJECTION INTRAMUSCULAR; INTRAVENOUS at 01:01

## 2019-09-16 NOTE — PROGRESS NOTES
General Daily Progress Note    Admit Date: 9/6/2019    Subjective:     Patient's pain conts to decrease. Current Facility-Administered Medications   Medication Dose Route Frequency    HYDROmorphone (PF) (DILAUDID) injection 0.5 mg  0.5 mg IntraVENous Q4H PRN    HYDROcodone-acetaminophen (NORCO)  mg tablet 1 Tab  1 Tab Oral Q6H PRN    pantoprazole (PROTONIX) tablet 40 mg  40 mg Oral ACB    ondansetron (ZOFRAN ODT) tablet 4 mg  4 mg Oral Q6H PRN    polyethylene glycol (MIRALAX) packet 17 g  17 g Oral BID    magnesium hydroxide (MILK OF MAGNESIA) 400 mg/5 mL oral suspension 30 mL  30 mL Oral DAILY PRN    sodium chloride (NS) flush 5-40 mL  5-40 mL IntraVENous Q8H    sodium chloride (NS) flush 5-40 mL  5-40 mL IntraVENous PRN    acetaminophen (TYLENOL) tablet 650 mg  650 mg Oral Q4H PRN    ondansetron (ZOFRAN) injection 4 mg  4 mg IntraVENous Q4H PRN    diphenhydrAMINE (BENADRYL) injection 12.5 mg  12.5 mg IntraVENous Q4H PRN    enoxaparin (LOVENOX) injection 40 mg  40 mg SubCUTAneous Q24H    dextrose 5% - 0.45% NaCl with KCl 20 mEq/L infusion  100 mL/hr IntraVENous CONTINUOUS        Review of Systems  A comprehensive review of systems was negative. Objective:     Patient Vitals for the past 24 hrs:   BP Temp Pulse Resp SpO2   09/15/19 1934 132/79 99.7 °F (37.6 °C) 87 16 99 %   09/15/19 1459 133/71 99.1 °F (37.3 °C) 85 16 100 %   09/15/19 1227 140/72 98.8 °F (37.1 °C) 86 16 96 %   09/15/19 0741 116/69 98.9 °F (37.2 °C) 88 16 98 %   09/15/19 0302 109/76 98.3 °F (36.8 °C) 90 16 100 %   09/14/19 2344 118/58 99 °F (37.2 °C) 84 16 95 %     No intake/output data recorded. 09/14 0701 - 09/15 1900  In: 4161.7 [P.O.:600; I.V.:3561.7]  Out: -     Physical Exam:   Visit Vitals  /79   Pulse 87   Temp 99.7 °F (37.6 °C)   Resp 16   Ht 5' 10\" (1.778 m)   Wt 202 lb 13.2 oz (92 kg)   SpO2 99%   Breastfeeding?  No   BMI 29.10 kg/m²     General appearance: alert, cooperative, no distress, appears stated age  Neck: supple, symmetrical, trachea midline, no adenopathy, thyroid: not enlarged, symmetric, no tenderness/mass/nodules, no carotid bruit and no JVD  Lungs: clear to auscultation bilaterally  Heart: regular rate and rhythm, S1, S2 normal, no murmur, click, rub or gallop  Abdomen: soft, non-tender. Bowel sounds normal. No masses,  no organomegaly  Extremities: extremities normal, atraumatic, no cyanosis or edema        Data Review No results found for this or any previous visit (from the past 24 hour(s)). Assessment:     Principal Problem:    Sickle cell pain crisis ([facility] [address] (9/8/2018)    Active Problems:    Hypertension (10/3/2014)      Venous insufficiency (10/3/2014)      Depression (10/5/2014)        Plan: 1. Cont treatment of pain with gradual reduction in analgesics.

## 2019-09-16 NOTE — PROGRESS NOTES
Plan:  -Home with no needs  -Family to transport       9:57AM  Continuing to work on pain control. Pt ambulating to the bathroom to void and up in the halls. CM will continue to follow and assist with d/c planning.        TEMITOPE Fan  Care Manager

## 2019-09-16 NOTE — PROGRESS NOTES
Uneventful shift    Dilaudid  Benadryl  Zofran   All given routinely    Report given to [facility]

## 2019-09-16 NOTE — PROGRESS NOTES
General Daily Progress Note    Admit Date: 9/6/2019    Subjective:     Patient conts to improve. Current Facility-Administered Medications   Medication Dose Route Frequency    HYDROmorphone (PF) (DILAUDID) injection 0.5 mg  0.5 mg IntraVENous Q4H PRN    HYDROcodone-acetaminophen (NORCO)  mg tablet 1 Tab  1 Tab Oral Q6H PRN    pantoprazole (PROTONIX) tablet 40 mg  40 mg Oral ACB    ondansetron (ZOFRAN ODT) tablet 4 mg  4 mg Oral Q6H PRN    polyethylene glycol (MIRALAX) packet 17 g  17 g Oral BID    magnesium hydroxide (MILK OF MAGNESIA) 400 mg/5 mL oral suspension 30 mL  30 mL Oral DAILY PRN    sodium chloride (NS) flush 5-40 mL  5-40 mL IntraVENous Q8H    sodium chloride (NS) flush 5-40 mL  5-40 mL IntraVENous PRN    acetaminophen (TYLENOL) tablet 650 mg  650 mg Oral Q4H PRN    ondansetron (ZOFRAN) injection 4 mg  4 mg IntraVENous Q4H PRN    diphenhydrAMINE (BENADRYL) injection 12.5 mg  12.5 mg IntraVENous Q4H PRN    enoxaparin (LOVENOX) injection 40 mg  40 mg SubCUTAneous Q24H    dextrose 5% - 0.45% NaCl with KCl 20 mEq/L infusion  50 mL/hr IntraVENous CONTINUOUS        Review of Systems  A comprehensive review of systems was negative. Objective:     Patient Vitals for the past 24 hrs:   BP Temp Pulse Resp SpO2   09/16/19 0735 129/74 99 °F (37.2 °C) 80 16 99 %   09/16/19 0400 132/62 98.6 °F (37 °C) 80 18 96 %   09/15/19 2312 136/64 98.9 °F (37.2 °C) 84 18 96 %   09/15/19 1934 132/79 99.7 °F (37.6 °C) 87 16 99 %   09/15/19 1459 133/71 99.1 °F (37.3 °C) 85 16 100 %   09/15/19 1227 140/72 98.8 °F (37.1 °C) 86 16 96 %     No intake/output data recorded. 09/14 1901 - 09/16 0700  In: 5076.7 [P.O.:840; I.V.:4236.7]  Out: 700 [Urine:700]    Physical Exam:   Visit Vitals  /74   Pulse 80   Temp 99 °F (37.2 °C)   Resp 16   Ht 5' 10\" (1.778 m)   Wt 202 lb 13.2 oz (92 kg)   SpO2 99%   Breastfeeding?  No   BMI 29.10 kg/m²     General appearance: alert, cooperative, no distress, appears stated age  Neck: supple, symmetrical, trachea midline, no adenopathy, thyroid: not enlarged, symmetric, no tenderness/mass/nodules, no carotid bruit and no JVD  Lungs: clear to auscultation bilaterally  Heart: regular rate and rhythm, S1, S2 normal, no murmur, click, rub or gallop  Abdomen: soft, non-tender. Bowel sounds normal. No masses,  no organomegaly  Extremities: extremities normal, atraumatic, no cyanosis or edema        Data Review No results found for this or any previous visit (from the past 24 hour(s)). Assessment:     Principal Problem:    Sickle cell pain crisis ([facility] [address] (9/8/2018)    Active Problems:    Hypertension (10/3/2014)      Venous insufficiency (10/3/2014)      Depression (10/5/2014)        Plan: 1. Cont with pain control. Decreasing analgesics. Will start Hydroxyurea today.

## 2019-09-16 NOTE — PROGRESS NOTES
General Surgery End of Shift Nursing Note    Bedside shift change report given to [address] (oncoming nurse) by Sherrie Riley RN (offgoing nurse). Report included the following information SBAR, Kardex, Procedure Summary, Intake/Output, MAR and Recent Results. Shift worked:   7p-7a   Summary of shift:    Pt medicated with zofran, benadryl and dilaudid every 4 hours as requested. Voiding appropriately in the bathroom. Pt was awake most of the shift, watching TV. Issues for physician to address:   N/A     Number times ambulated in hallway past shift: 1    Number of times OOB to chair past shift: 3    Pain Management:  Current medication: Dilaudid  Patient states pain is manageable on current pain medication: YES    GI:    Current diet:  DIET REGULAR    Tolerating current diet: YES  Passing flatus: NO  Last Bowel Movement: several days ago    Respiratory:    Incentive Spirometer at bedside: YES  Patient instructed on use: YES    Patient Safety:    Falls Score: 1  Bed Alarm On? No  Sitter?  No    Laron Kawasaki, RN

## 2019-09-17 ENCOUNTER — PATIENT OUTREACH (OUTPATIENT)
Dept: INTERNAL MEDICINE CLINIC | Age: 59
End: 2019-09-17

## 2019-09-17 PROCEDURE — 65270000029 HC RM PRIVATE

## 2019-09-17 PROCEDURE — 74011250636 HC RX REV CODE- 250/636: Performed by: HOSPITALIST

## 2019-09-17 PROCEDURE — 74011250637 HC RX REV CODE- 250/637: Performed by: INTERNAL MEDICINE

## 2019-09-17 PROCEDURE — 94760 N-INVAS EAR/PLS OXIMETRY 1: CPT

## 2019-09-17 PROCEDURE — 74011250636 HC RX REV CODE- 250/636: Performed by: INTERNAL MEDICINE

## 2019-09-17 PROCEDURE — 77010033678 HC OXYGEN DAILY

## 2019-09-17 RX ADMIN — ENOXAPARIN SODIUM 40 MG: 40 INJECTION, SOLUTION INTRAVENOUS; SUBCUTANEOUS at 17:01

## 2019-09-17 RX ADMIN — HYDROMORPHONE HYDROCHLORIDE 0.5 MG: 1 INJECTION, SOLUTION INTRAMUSCULAR; INTRAVENOUS; SUBCUTANEOUS at 04:11

## 2019-09-17 RX ADMIN — DEXTROSE MONOHYDRATE, SODIUM CHLORIDE, AND POTASSIUM CHLORIDE 50 ML/HR: 50; 4.5; 1.49 INJECTION, SOLUTION INTRAVENOUS at 04:12

## 2019-09-17 RX ADMIN — PANTOPRAZOLE SODIUM 40 MG: 40 TABLET, DELAYED RELEASE ORAL at 06:40

## 2019-09-17 RX ADMIN — ONDANSETRON 4 MG: 2 INJECTION INTRAMUSCULAR; INTRAVENOUS at 20:45

## 2019-09-17 RX ADMIN — HYDROMORPHONE HYDROCHLORIDE 0.5 MG: 1 INJECTION, SOLUTION INTRAMUSCULAR; INTRAVENOUS; SUBCUTANEOUS at 12:17

## 2019-09-17 RX ADMIN — DIPHENHYDRAMINE HYDROCHLORIDE 12.5 MG: 50 INJECTION, SOLUTION INTRAMUSCULAR; INTRAVENOUS at 08:16

## 2019-09-17 RX ADMIN — ONDANSETRON 4 MG: 2 INJECTION INTRAMUSCULAR; INTRAVENOUS at 16:39

## 2019-09-17 RX ADMIN — Medication 10 ML: at 06:40

## 2019-09-17 RX ADMIN — Medication 10 ML: at 20:46

## 2019-09-17 RX ADMIN — HYDROMORPHONE HYDROCHLORIDE 0.5 MG: 1 INJECTION, SOLUTION INTRAMUSCULAR; INTRAVENOUS; SUBCUTANEOUS at 16:39

## 2019-09-17 RX ADMIN — HYDROXYUREA 500 MG: 500 CAPSULE ORAL at 08:25

## 2019-09-17 RX ADMIN — ONDANSETRON 4 MG: 2 INJECTION INTRAMUSCULAR; INTRAVENOUS at 12:17

## 2019-09-17 RX ADMIN — DIPHENHYDRAMINE HYDROCHLORIDE 12.5 MG: 50 INJECTION, SOLUTION INTRAMUSCULAR; INTRAVENOUS at 04:11

## 2019-09-17 RX ADMIN — HYDROMORPHONE HYDROCHLORIDE 0.5 MG: 1 INJECTION, SOLUTION INTRAMUSCULAR; INTRAVENOUS; SUBCUTANEOUS at 20:45

## 2019-09-17 RX ADMIN — HYDROMORPHONE HYDROCHLORIDE 0.5 MG: 1 INJECTION, SOLUTION INTRAMUSCULAR; INTRAVENOUS; SUBCUTANEOUS at 08:16

## 2019-09-17 RX ADMIN — DIPHENHYDRAMINE HYDROCHLORIDE 12.5 MG: 50 INJECTION, SOLUTION INTRAMUSCULAR; INTRAVENOUS at 20:45

## 2019-09-17 RX ADMIN — DEXTROSE MONOHYDRATE, SODIUM CHLORIDE, AND POTASSIUM CHLORIDE 50 ML/HR: 50; 4.5; 1.49 INJECTION, SOLUTION INTRAVENOUS at 20:56

## 2019-09-17 RX ADMIN — ONDANSETRON 4 MG: 2 INJECTION INTRAMUSCULAR; INTRAVENOUS at 04:11

## 2019-09-17 RX ADMIN — Medication 10 ML: at 12:19

## 2019-09-17 RX ADMIN — DIPHENHYDRAMINE HYDROCHLORIDE 12.5 MG: 50 INJECTION, SOLUTION INTRAMUSCULAR; INTRAVENOUS at 16:39

## 2019-09-17 RX ADMIN — DIPHENHYDRAMINE HYDROCHLORIDE 12.5 MG: 50 INJECTION, SOLUTION INTRAMUSCULAR; INTRAVENOUS at 12:17

## 2019-09-17 RX ADMIN — ONDANSETRON 4 MG: 2 INJECTION INTRAMUSCULAR; INTRAVENOUS at 08:17

## 2019-09-17 NOTE — PROGRESS NOTES
[facility] 9/6/2019-present:  Sickle Cell Pain Crisis-discharge planning    Patient on NN f/u schedule; Patient remains admitted at [facility] . Chart Review:  PCP Note 9/16:  1. Cont with pain control. Decreasing analgesics. Will start Hydroxyurea today. Will continue to f/u for discharge.   EW

## 2019-09-17 NOTE — PROGRESS NOTES
0700- Report given to Jackelyn Salazar RN by off going nurse. Pt medicated throughout shift for pain with PRN dilaudid along with Zofran and Benadryl. See MAR. Pt up ad erum with no lightheadedness or dizziness.  Midline to BURT C/D/I.     1930- Report given to oncoming nurse by Jackelyn Salazar RN

## 2019-09-17 NOTE — PROGRESS NOTES
General Surgery End of Shift Nursing Note    Bedside shift change report given to Sukhjinder Ramos (oncoming nurse) by Joan Phan (offgoing nurse). Report included the following information SBAR, Intake/Output and MAR. Shift worked:   [id number]   Summary of shift:    Pain/nausea managed overnight with PRN medications. Pulse ox on continuously per order. No issues.     Issues for physician to address:   -       Shaheed Smith

## 2019-09-18 LAB
BASOPHILS # BLD: 0.3 K/UL (ref 0–0.1)
BASOPHILS NFR BLD: 3 % (ref 0–1)
BLASTS NFR BLD MANUAL: 0 %
DIFFERENTIAL METHOD BLD: ABNORMAL
EOSINOPHIL # BLD: 0.8 K/UL (ref 0–0.4)
EOSINOPHIL NFR BLD: 9 % (ref 0–7)
ERYTHROCYTE [DISTWIDTH] IN BLOOD BY AUTOMATED COUNT: 15.9 % (ref 11.5–14.5)
HCT VFR BLD AUTO: 24.8 % (ref 35–47)
HGB BLD-MCNC: 8.5 G/DL (ref 11.5–16)
IMM GRANULOCYTES # BLD AUTO: 0 K/UL
IMM GRANULOCYTES NFR BLD AUTO: 0 %
LYMPHOCYTES # BLD: 3.5 K/UL (ref 0.8–3.5)
LYMPHOCYTES NFR BLD: 39 % (ref 12–49)
MCH RBC QN AUTO: 32.4 PG (ref 26–34)
MCHC RBC AUTO-ENTMCNC: 34.3 G/DL (ref 30–36.5)
MCV RBC AUTO: 94.7 FL (ref 80–99)
METAMYELOCYTES NFR BLD MANUAL: 0 %
MONOCYTES # BLD: 0.5 K/UL (ref 0–1)
MONOCYTES NFR BLD: 6 % (ref 5–13)
MYELOCYTES NFR BLD MANUAL: 0 %
NEUTS BAND NFR BLD MANUAL: 0 % (ref 0–6)
NEUTS SEG # BLD: 3.9 K/UL (ref 1.8–8)
NEUTS SEG NFR BLD: 43 % (ref 32–75)
NRBC # BLD: 0.06 K/UL (ref 0–0.01)
NRBC BLD-RTO: 0.7 PER 100 WBC
OTHER CELLS NFR BLD MANUAL: 0 %
PLATELET # BLD AUTO: 284 K/UL (ref 150–400)
PMV BLD AUTO: 11.6 FL (ref 8.9–12.9)
PROMYELOCYTES NFR BLD MANUAL: 0 %
RBC # BLD AUTO: 2.62 M/UL (ref 3.8–5.2)
RBC MORPH BLD: ABNORMAL
WBC # BLD AUTO: 9 K/UL (ref 3.6–11)

## 2019-09-18 PROCEDURE — 94760 N-INVAS EAR/PLS OXIMETRY 1: CPT

## 2019-09-18 PROCEDURE — 74011250636 HC RX REV CODE- 250/636: Performed by: HOSPITALIST

## 2019-09-18 PROCEDURE — 65270000029 HC RM PRIVATE

## 2019-09-18 PROCEDURE — 74011250636 HC RX REV CODE- 250/636: Performed by: INTERNAL MEDICINE

## 2019-09-18 PROCEDURE — 74011250637 HC RX REV CODE- 250/637: Performed by: INTERNAL MEDICINE

## 2019-09-18 PROCEDURE — 36415 COLL VENOUS BLD VENIPUNCTURE: CPT

## 2019-09-18 PROCEDURE — 85027 COMPLETE CBC AUTOMATED: CPT

## 2019-09-18 PROCEDURE — 77010033678 HC OXYGEN DAILY

## 2019-09-18 RX ORDER — HYDROMORPHONE HYDROCHLORIDE 1 MG/ML
0.5 INJECTION, SOLUTION INTRAMUSCULAR; INTRAVENOUS; SUBCUTANEOUS
Status: DISCONTINUED | OUTPATIENT
Start: 2019-09-18 | End: 2019-09-19

## 2019-09-18 RX ADMIN — Medication 10 ML: at 10:17

## 2019-09-18 RX ADMIN — ONDANSETRON 4 MG: 2 INJECTION INTRAMUSCULAR; INTRAVENOUS at 01:00

## 2019-09-18 RX ADMIN — ONDANSETRON 4 MG: 2 INJECTION INTRAMUSCULAR; INTRAVENOUS at 06:04

## 2019-09-18 RX ADMIN — HYDROMORPHONE HYDROCHLORIDE 0.5 MG: 1 INJECTION, SOLUTION INTRAMUSCULAR; INTRAVENOUS; SUBCUTANEOUS at 01:00

## 2019-09-18 RX ADMIN — Medication 10 ML: at 14:41

## 2019-09-18 RX ADMIN — ONDANSETRON 4 MG: 2 INJECTION INTRAMUSCULAR; INTRAVENOUS at 10:16

## 2019-09-18 RX ADMIN — HYDROXYUREA 500 MG: 500 CAPSULE ORAL at 12:21

## 2019-09-18 RX ADMIN — DIPHENHYDRAMINE HYDROCHLORIDE 12.5 MG: 50 INJECTION, SOLUTION INTRAMUSCULAR; INTRAVENOUS at 19:09

## 2019-09-18 RX ADMIN — DIPHENHYDRAMINE HYDROCHLORIDE 12.5 MG: 50 INJECTION, SOLUTION INTRAMUSCULAR; INTRAVENOUS at 10:14

## 2019-09-18 RX ADMIN — PANTOPRAZOLE SODIUM 40 MG: 40 TABLET, DELAYED RELEASE ORAL at 06:05

## 2019-09-18 RX ADMIN — HYDROMORPHONE HYDROCHLORIDE 0.5 MG: 1 INJECTION, SOLUTION INTRAMUSCULAR; INTRAVENOUS; SUBCUTANEOUS at 19:00

## 2019-09-18 RX ADMIN — HYDROMORPHONE HYDROCHLORIDE 0.5 MG: 1 INJECTION, SOLUTION INTRAMUSCULAR; INTRAVENOUS; SUBCUTANEOUS at 06:04

## 2019-09-18 RX ADMIN — DIPHENHYDRAMINE HYDROCHLORIDE 12.5 MG: 50 INJECTION, SOLUTION INTRAMUSCULAR; INTRAVENOUS at 06:04

## 2019-09-18 RX ADMIN — DIPHENHYDRAMINE HYDROCHLORIDE 12.5 MG: 50 INJECTION, SOLUTION INTRAMUSCULAR; INTRAVENOUS at 01:00

## 2019-09-18 RX ADMIN — HYDROMORPHONE HYDROCHLORIDE 0.5 MG: 1 INJECTION, SOLUTION INTRAMUSCULAR; INTRAVENOUS; SUBCUTANEOUS at 14:40

## 2019-09-18 RX ADMIN — ENOXAPARIN SODIUM 40 MG: 40 INJECTION, SOLUTION INTRAVENOUS; SUBCUTANEOUS at 18:00

## 2019-09-18 RX ADMIN — DIPHENHYDRAMINE HYDROCHLORIDE 12.5 MG: 50 INJECTION, SOLUTION INTRAMUSCULAR; INTRAVENOUS at 14:40

## 2019-09-18 RX ADMIN — ONDANSETRON 4 MG: 2 INJECTION INTRAMUSCULAR; INTRAVENOUS at 19:09

## 2019-09-18 RX ADMIN — ONDANSETRON 4 MG: 2 INJECTION INTRAMUSCULAR; INTRAVENOUS at 14:40

## 2019-09-18 RX ADMIN — Medication 10 ML: at 06:05

## 2019-09-18 RX ADMIN — Medication 10 ML: at 22:00

## 2019-09-18 RX ADMIN — HYDROMORPHONE HYDROCHLORIDE 0.5 MG: 1 INJECTION, SOLUTION INTRAMUSCULAR; INTRAVENOUS; SUBCUTANEOUS at 10:16

## 2019-09-18 NOTE — PROGRESS NOTES
General Daily Progress Note    Admit Date: 9/6/2019    Subjective:     Patient has no complaint . Current Facility-Administered Medications   Medication Dose Route Frequency    influenza vaccine 2019-20 (6 mos+)(PF) (FLUARIX/FLULAVAL/FLUZONE QUAD) injection 0.5 mL  0.5 mL IntraMUSCular PRIOR TO DISCHARGE    hydroxyurea (HYDREA) chemo cap 500 mg  500 mg Oral DAILY    HYDROmorphone (PF) (DILAUDID) injection 0.5 mg  0.5 mg IntraVENous Q4H PRN    HYDROcodone-acetaminophen (NORCO)  mg tablet 1 Tab  1 Tab Oral Q6H PRN    pantoprazole (PROTONIX) tablet 40 mg  40 mg Oral ACB    ondansetron (ZOFRAN ODT) tablet 4 mg  4 mg Oral Q6H PRN    polyethylene glycol (MIRALAX) packet 17 g  17 g Oral BID    magnesium hydroxide (MILK OF MAGNESIA) 400 mg/5 mL oral suspension 30 mL  30 mL Oral DAILY PRN    sodium chloride (NS) flush 5-40 mL  5-40 mL IntraVENous Q8H    sodium chloride (NS) flush 5-40 mL  5-40 mL IntraVENous PRN    acetaminophen (TYLENOL) tablet 650 mg  650 mg Oral Q4H PRN    ondansetron (ZOFRAN) injection 4 mg  4 mg IntraVENous Q4H PRN    diphenhydrAMINE (BENADRYL) injection 12.5 mg  12.5 mg IntraVENous Q4H PRN    enoxaparin (LOVENOX) injection 40 mg  40 mg SubCUTAneous Q24H    dextrose 5% - 0.45% NaCl with KCl 20 mEq/L infusion  50 mL/hr IntraVENous CONTINUOUS        Review of Systems  A comprehensive review of systems was negative. Objective:     Patient Vitals for the past 24 hrs:   BP Temp Pulse Resp SpO2   09/17/19 2015 117/54 98.8 °F (37.1 °C) 87 16 100 %   09/17/19 1622 129/82 99.4 °F (37.4 °C) 84 16 99 %   09/17/19 1212 123/77 98.8 °F (37.1 °C) 79 15 100 %   09/17/19 0759 140/78 97.1 °F (36.2 °C) 81 12 98 %   09/17/19 0253 133/66 98.3 °F (36.8 °C) 82 14 96 %   09/16/19 2312 134/68 98.7 °F (37.1 °C) 83 14 99 %     No intake/output data recorded. 09/16 0701 - 09/17 1900  In: 2582.5 [P.O.:740;  I.V.:1842.5]  Out: -     Physical Exam:   Visit Vitals  /54   Pulse 87   Temp 98.8 °F (37.1 °C)   Resp 16   Ht 5' 10\" (1.778 m)   Wt 202 lb 13.2 oz (92 kg)   SpO2 100%   Breastfeeding? No   BMI 29.10 kg/m²     General appearance: alert, cooperative, no distress, appears stated age  Neck: supple, symmetrical, trachea midline, no adenopathy, thyroid: not enlarged, symmetric, no tenderness/mass/nodules, no carotid bruit and no JVD  Lungs: clear to auscultation bilaterally  Heart: regular rate and rhythm, S1, S2 normal, no murmur, click, rub or gallop  Abdomen: soft, non-tender. Bowel sounds normal. No masses,  no organomegaly  Extremities: extremities normal, atraumatic, no cyanosis or edema        Data Review No results found for this or any previous visit (from the past 24 hour(s)). Assessment:     Principal Problem:    Sickle cell pain crisis ([facility] [address] (9/8/2018)    Active Problems:    Hypertension (10/3/2014)      Venous insufficiency (10/3/2014)      Depression (10/5/2014)        Plan: 1. Increasing pain. Will cont analgesics for now. 2.Hydroxyurea added.

## 2019-09-18 NOTE — PROGRESS NOTES
General Surgery End of Shift Nursing Note    Bedside shift change report given to Michele Abraham (oncoming nurse) by Javier Tolbert (offgoing nurse). Report included the following information SBAR, Intake/Output and MAR. Shift worked:   [id number]     Summary of shift:    Uneventful. PRN medications administered per request. Continuous pulse ox- on 2LNC.    Issues for physician to address:   Nunu Diaz

## 2019-09-19 ENCOUNTER — PATIENT OUTREACH (OUTPATIENT)
Dept: INTERNAL MEDICINE CLINIC | Age: 59
End: 2019-09-19

## 2019-09-19 PROCEDURE — 77010033678 HC OXYGEN DAILY

## 2019-09-19 PROCEDURE — 74011250637 HC RX REV CODE- 250/637: Performed by: INTERNAL MEDICINE

## 2019-09-19 PROCEDURE — 74011250636 HC RX REV CODE- 250/636: Performed by: HOSPITALIST

## 2019-09-19 PROCEDURE — 65270000029 HC RM PRIVATE

## 2019-09-19 PROCEDURE — 74011250636 HC RX REV CODE- 250/636: Performed by: INTERNAL MEDICINE

## 2019-09-19 PROCEDURE — 94760 N-INVAS EAR/PLS OXIMETRY 1: CPT

## 2019-09-19 RX ORDER — FENTANYL 75 UG/H
1 PATCH TRANSDERMAL
Status: DISCONTINUED | OUTPATIENT
Start: 2019-09-19 | End: 2019-09-20 | Stop reason: HOSPADM

## 2019-09-19 RX ADMIN — Medication 10 ML: at 14:56

## 2019-09-19 RX ADMIN — DIPHENHYDRAMINE HYDROCHLORIDE 12.5 MG: 50 INJECTION, SOLUTION INTRAMUSCULAR; INTRAVENOUS at 14:06

## 2019-09-19 RX ADMIN — ENOXAPARIN SODIUM 40 MG: 40 INJECTION, SOLUTION INTRAVENOUS; SUBCUTANEOUS at 17:05

## 2019-09-19 RX ADMIN — ONDANSETRON 4 MG: 2 INJECTION INTRAMUSCULAR; INTRAVENOUS at 01:40

## 2019-09-19 RX ADMIN — ONDANSETRON 4 MG: 2 INJECTION INTRAMUSCULAR; INTRAVENOUS at 14:06

## 2019-09-19 RX ADMIN — HYDROMORPHONE HYDROCHLORIDE 0.5 MG: 1 INJECTION, SOLUTION INTRAMUSCULAR; INTRAVENOUS; SUBCUTANEOUS at 01:38

## 2019-09-19 RX ADMIN — Medication 10 ML: at 05:39

## 2019-09-19 RX ADMIN — ONDANSETRON 4 MG: 2 INJECTION INTRAMUSCULAR; INTRAVENOUS at 21:19

## 2019-09-19 RX ADMIN — PANTOPRAZOLE SODIUM 40 MG: 40 TABLET, DELAYED RELEASE ORAL at 05:40

## 2019-09-19 RX ADMIN — DIPHENHYDRAMINE HYDROCHLORIDE 12.5 MG: 50 INJECTION, SOLUTION INTRAMUSCULAR; INTRAVENOUS at 08:56

## 2019-09-19 RX ADMIN — HYDROCODONE BITARTRATE AND ACETAMINOPHEN 1 TABLET: 10; 325 TABLET ORAL at 21:18

## 2019-09-19 RX ADMIN — ONDANSETRON 4 MG: 2 INJECTION INTRAMUSCULAR; INTRAVENOUS at 08:56

## 2019-09-19 RX ADMIN — HYDROMORPHONE HYDROCHLORIDE 0.5 MG: 1 INJECTION, SOLUTION INTRAMUSCULAR; INTRAVENOUS; SUBCUTANEOUS at 08:57

## 2019-09-19 RX ADMIN — DIPHENHYDRAMINE HYDROCHLORIDE 12.5 MG: 50 INJECTION, SOLUTION INTRAMUSCULAR; INTRAVENOUS at 01:39

## 2019-09-19 RX ADMIN — HYDROMORPHONE HYDROCHLORIDE 0.5 MG: 1 INJECTION, SOLUTION INTRAMUSCULAR; INTRAVENOUS; SUBCUTANEOUS at 15:03

## 2019-09-19 RX ADMIN — DIPHENHYDRAMINE HYDROCHLORIDE 12.5 MG: 50 INJECTION, SOLUTION INTRAMUSCULAR; INTRAVENOUS at 21:19

## 2019-09-19 RX ADMIN — HYDROXYUREA 500 MG: 500 CAPSULE ORAL at 08:54

## 2019-09-19 RX ADMIN — HYDROCODONE BITARTRATE AND ACETAMINOPHEN 1 TABLET: 10; 325 TABLET ORAL at 14:06

## 2019-09-19 NOTE — PROGRESS NOTES
Bedside and Verbal shift change report given to Kita (oncoming nurse) by Lanette Sunshine (offgoing nurse). Report included the following information SBAR, Kardex, MAR and Recent Results.

## 2019-09-19 NOTE — PROGRESS NOTES
General Daily Progress Note    Admit Date: 9/6/2019    Subjective:     Patient feels better. Current Facility-Administered Medications   Medication Dose Route Frequency    HYDROmorphone (PF) (DILAUDID) injection 0.5 mg  0.5 mg IntraVENous Q6H PRN    influenza vaccine 2019-20 (6 mos+)(PF) (FLUARIX/FLULAVAL/FLUZONE QUAD) injection 0.5 mL  0.5 mL IntraMUSCular PRIOR TO DISCHARGE    hydroxyurea (HYDREA) chemo cap 500 mg  500 mg Oral DAILY    HYDROcodone-acetaminophen (NORCO)  mg tablet 1 Tab  1 Tab Oral Q6H PRN    pantoprazole (PROTONIX) tablet 40 mg  40 mg Oral ACB    ondansetron (ZOFRAN ODT) tablet 4 mg  4 mg Oral Q6H PRN    polyethylene glycol (MIRALAX) packet 17 g  17 g Oral BID    magnesium hydroxide (MILK OF MAGNESIA) 400 mg/5 mL oral suspension 30 mL  30 mL Oral DAILY PRN    sodium chloride (NS) flush 5-40 mL  5-40 mL IntraVENous Q8H    sodium chloride (NS) flush 5-40 mL  5-40 mL IntraVENous PRN    acetaminophen (TYLENOL) tablet 650 mg  650 mg Oral Q4H PRN    ondansetron (ZOFRAN) injection 4 mg  4 mg IntraVENous Q4H PRN    diphenhydrAMINE (BENADRYL) injection 12.5 mg  12.5 mg IntraVENous Q4H PRN    enoxaparin (LOVENOX) injection 40 mg  40 mg SubCUTAneous Q24H    dextrose 5% - 0.45% NaCl with KCl 20 mEq/L infusion  25 mL/hr IntraVENous CONTINUOUS        Review of Systems  A comprehensive review of systems was negative. Objective:     Patient Vitals for the past 24 hrs:   BP Temp Pulse Resp SpO2   09/18/19 1949 147/89 98.3 °F (36.8 °C) 81 18 100 %   09/18/19 1559 132/83 99.5 °F (37.5 °C) 83 17 96 %   09/18/19 1153 134/88 99.1 °F (37.3 °C) 82 18 100 %   09/18/19 0717 134/81 98.6 °F (37 °C) 75 16 100 %   09/18/19 0349 114/62 98.7 °F (37.1 °C) 74 16 98 %   09/17/19 2232 115/60 98.3 °F (36.8 °C) 80 16 95 %     No intake/output data recorded. 09/17 0701 - 09/18 1900  In: 9402 [P.O.:1160;  I.V.:550]  Out: -     Physical Exam:   Visit Vitals  /89   Pulse 81   Temp 98.3 °F (36.8 °C)   Resp 18   Ht 5' 10\" (1.778 m)   Wt 202 lb 13.2 oz (92 kg)   SpO2 100%   Breastfeeding? No   BMI 29.10 kg/m²     General appearance: alert, cooperative, no distress, appears stated age  Neck: supple, symmetrical, trachea midline, no adenopathy, thyroid: not enlarged, symmetric, no tenderness/mass/nodules, no carotid bruit and no JVD  Lungs: clear to auscultation bilaterally  Heart: regular rate and rhythm, S1, S2 normal, no murmur, click, rub or gallop  Abdomen: soft, non-tender. Bowel sounds normal. No masses,  no organomegaly  Extremities: extremities normal, atraumatic, no cyanosis or edema        Data Review   Recent Results (from the past 24 hour(s))   CBC WITH MANUAL DIFF    Collection Time: 09/18/19  5:56 AM   Result Value Ref Range    WBC 9.0 3.6 - 11.0 K/uL    RBC 2.62 (L) 3.80 - 5.20 M/uL    HGB 8.5 (L) 11.5 - 16.0 g/dL    HCT 24.8 (L) 35.0 - 47.0 %    MCV 94.7 80.0 - 99.0 FL    MCH 32.4 26.0 - 34.0 PG    MCHC 34.3 30.0 - 36.5 g/dL    RDW 15.9 (H) 11.5 - 14.5 %    PLATELET 644 061 - 770 K/uL    MPV 11.6 8.9 - 12.9 FL    NRBC 0.7 (H) 0  WBC    ABSOLUTE NRBC 0.06 (H) 0.00 - 0.01 K/uL    NEUTROPHILS 43 32 - 75 %    BAND NEUTROPHILS 0 0 - 6 %    LYMPHOCYTES 39 12 - 49 %    MONOCYTES 6 5 - 13 %    EOSINOPHILS 9 (H) 0 - 7 %    BASOPHILS 3 (H) 0 - 1 %    METAMYELOCYTES 0 0 %    MYELOCYTES 0 0 %    PROMYELOCYTES 0 0 %    BLASTS 0 0 %    OTHER CELL 0 0      IMMATURE GRANULOCYTES 0 %    ABS. NEUTROPHILS 3.9 1.8 - 8.0 K/UL    ABS. LYMPHOCYTES 3.5 0.8 - 3.5 K/UL    ABS. MONOCYTES 0.5 0.0 - 1.0 K/UL    ABS. EOSINOPHILS 0.8 (H) 0.0 - 0.4 K/UL    ABS. BASOPHILS 0.3 (H) 0.0 - 0.1 K/UL    ABS. IMM.  GRANS. 0.0 K/UL    DF MANUAL      RBC COMMENTS SICKLE CELLS  2+        RBC COMMENTS ANISOCYTOSIS  1+        RBC COMMENTS TARGET CELLS  1+               Assessment:     Principal Problem:    Sickle cell pain crisis ([facility] [address] (9/8/2018)    Active Problems:    Hypertension (10/3/2014)      Venous insufficiency (10/3/2014) Depression (10/5/2014)        Plan: 1. Better today. Cont pain control. If all goes well d/c on Fri.

## 2019-09-19 NOTE — ROUTINE PROCESS
The following appointments have been successfully scheduled: 
 
Date/time Thursday, September 26, 2019 01:15 PM 
Patient  Larissa Bustillo 1960 ([id number] F) #[id number] [id number] Department [facility] OFFICE Appointment type Transitional Care Provider Gagandeep Gold

## 2019-09-19 NOTE — PROGRESS NOTES
Goals Addressed                 This Visit's Progress     Facilitate transitions of care (ie. palliative care, assisted living, nursing home, changes in living arrangements). On track     9/19/2019:  Consult w/ PCP for Ambulatory Pre D/C Planning. Consult done w/ Inpatient KELVIN Rodgers re d/c plans/concerns of patient; none specific-depression screening. NN spoke with Charge Nurse [address]; states depression screen is done under the admissions database questionaire;  Review of questionairre done: specifically suicide in nature. NN contacted patient for ambulatory pre d/c concerns re medication as previously experienced with [facility] . Patient denied any forseen concerns or problems w/ the needed pain medications or living arrangement. Depression screening done from the 2 PQ screening. Patient denied depression; began telling how she has a daughter and grandchildren here in town that is the center of her debra, and another one on the way within months. States also son & wife who lives in [place] is also expecting a new baby which she is looking forward to going down the first of Nov.  Patient states she has too much going on to be depressed to the point of not wanting to do anything; states the depression she experiences is when she is in \"this hospital and can't get out. .. \". Patient states she sees her parents who are still living and doing well;  States she has lots of girls friends and they do a lot together--which she is missing out on. Patient teach-back done on the new medication: hydroxyurea. As previously informed, patient again states all the side effects she heard about, that when it first was offered her at [facility], it was primarily used for chemotherapy with multiple side effects but states now having tried everything else, and her PCP was comfortable in her trying; she states why not. .her son & daughter agrees with her trying it out to see how it is tolerated.   Discussed the known side effects the hydroxyurea may bring, but she is willing to give it a try. Patient sounding upbeat and stating she is ready to go home; that she has a lot to look forward to. Patient further discussed weight loss plan ( as PCP had advised during office visits):  States she will give up drinking sodas-replacing with water and less eating out/buying prepared foods and begin to eat more healthy in light of \"foods that build Vit B levels in trying to work with this new medicine\". NN will continue to f/u post d/c.   EW         goal completion:  Pending d/c.

## 2019-09-19 NOTE — PROGRESS NOTES
General Surgery End of Shift Nursing Note    Bedside shift change report given to Colt DAWSON (oncoming nurse) by Carol Cordoba RN (offgoing nurse). Report included the following information SBAR, Kardex, Procedure Summary, Intake/Output, MAR and Recent Results. Shift worked:   7p-7a   Summary of shift:    Pt slept most of the shift. Medicated with PRN's as requested. Ambulating to the BR, voiding appropriately. Issues for physician to address:   N/A     Number times ambulated in hallway past shift: 1    Number of times OOB to chair past shift: 3    Pain Management:  Current medication: Dilaudid  Patient states pain is manageable on current pain medication: YES    GI:    Current diet:  DIET REGULAR    Tolerating current diet: YES  Passing flatus: NO  Last Bowel Movement: several days ago    Respiratory:    Incentive Spirometer at bedside: YES  Patient instructed on use: YES    Patient Safety:    Falls Score: 1  Bed Alarm On? No  Sitter?  No    Livier Martinez RN

## 2019-09-19 NOTE — PROGRESS NOTES
General Surgery End of Shift Nursing Note    Bedside shift change report given to Lexus Aguayo (oncoming nurse) by Reshma Ribeiro (offgoing nurse). Report included the following information SBAR, Kardex, Intake/Output, MAR and Recent Results. Shift worked:   7 am to 7 pm   Summary of shift:    Pain and nausea meds given as requested. Pt tolerating diet. Pt voiding appropriately. One bowel movement today. Issues for physician to address:   none     Number times ambulated in hallway past shift: 1    Number of times OOB to chair past shift: 0    Pain Management:  Current medication: dilaudid and norco (fentanyl patch to left arm)  Patient states pain is manageable on current pain medication:  YES    GI:    Current diet:  DIET REGULAR    Tolerating current diet: YES  Passing flatus: YES  Last Bowel Movement: today    Respiratory:    Incentive Spirometer at bedside: NO  Patient instructed on use: NO    Patient Safety:    Falls Score: 1  Bed Alarm On? No  Sitter?  No    Terence Gallegos

## 2019-09-19 NOTE — PROGRESS NOTES
KRISTINA:    *Home with f/u appts and family support  > family will provide transportation at d/c  > pt will need 2nd IM letter before d/c    CM reviewed pt's chart before moving forward with d/c planning. CM sent request to CM specialist to secure f/u appt with pt's PCP; f/u appt secured and reflected in AVS.    CM will continue to follow patient for discharge planning needs and arrange for services as deemed necessary.     Tarik Rhoades, MSW  Care Manager, [facility]

## 2019-09-20 VITALS
HEART RATE: 76 BPM | RESPIRATION RATE: 18 BRPM | BODY MASS INDEX: 29.04 KG/M2 | HEIGHT: 70 IN | OXYGEN SATURATION: 100 % | WEIGHT: 202.82 LBS | DIASTOLIC BLOOD PRESSURE: 83 MMHG | TEMPERATURE: 98 F | SYSTOLIC BLOOD PRESSURE: 149 MMHG

## 2019-09-20 PROCEDURE — 90686 IIV4 VACC NO PRSV 0.5 ML IM: CPT | Performed by: INTERNAL MEDICINE

## 2019-09-20 PROCEDURE — 94760 N-INVAS EAR/PLS OXIMETRY 1: CPT

## 2019-09-20 PROCEDURE — 77010033678 HC OXYGEN DAILY

## 2019-09-20 PROCEDURE — 74011250637 HC RX REV CODE- 250/637: Performed by: INTERNAL MEDICINE

## 2019-09-20 PROCEDURE — 74011250636 HC RX REV CODE- 250/636: Performed by: INTERNAL MEDICINE

## 2019-09-20 PROCEDURE — 74011250636 HC RX REV CODE- 250/636: Performed by: HOSPITALIST

## 2019-09-20 PROCEDURE — 90471 IMMUNIZATION ADMIN: CPT

## 2019-09-20 RX ORDER — HYDROXYUREA 500 MG/1
CAPSULE ORAL
Qty: 30 CAP | Refills: 4 | Status: ON HOLD | OUTPATIENT
Start: 2019-09-20 | End: 2019-10-17 | Stop reason: SDUPTHER

## 2019-09-20 RX ADMIN — PANTOPRAZOLE SODIUM 40 MG: 40 TABLET, DELAYED RELEASE ORAL at 06:32

## 2019-09-20 RX ADMIN — Medication 10 ML: at 03:34

## 2019-09-20 RX ADMIN — DIPHENHYDRAMINE HYDROCHLORIDE 12.5 MG: 50 INJECTION, SOLUTION INTRAMUSCULAR; INTRAVENOUS at 03:24

## 2019-09-20 RX ADMIN — DIPHENHYDRAMINE HYDROCHLORIDE 12.5 MG: 50 INJECTION, SOLUTION INTRAMUSCULAR; INTRAVENOUS at 09:38

## 2019-09-20 RX ADMIN — HYDROXYUREA 500 MG: 500 CAPSULE ORAL at 09:37

## 2019-09-20 RX ADMIN — ONDANSETRON 4 MG: 2 INJECTION INTRAMUSCULAR; INTRAVENOUS at 03:24

## 2019-09-20 RX ADMIN — Medication 10 ML: at 06:58

## 2019-09-20 RX ADMIN — INFLUENZA VIRUS VACCINE 0.5 ML: 15; 15; 15; 15 SUSPENSION INTRAMUSCULAR at 13:34

## 2019-09-20 RX ADMIN — HYDROCODONE BITARTRATE AND ACETAMINOPHEN 1 TABLET: 10; 325 TABLET ORAL at 09:37

## 2019-09-20 RX ADMIN — ONDANSETRON 4 MG: 2 INJECTION INTRAMUSCULAR; INTRAVENOUS at 09:38

## 2019-09-20 RX ADMIN — HYDROCODONE BITARTRATE AND ACETAMINOPHEN 1 TABLET: 10; 325 TABLET ORAL at 03:24

## 2019-09-20 NOTE — PROGRESS NOTES
Pt discharged per MD order. Pt has all personal belongings, prescriptions, and discharge instructions. Discharge instructions gone over with pt. Pt does not have any further questions regarding discharge.

## 2019-09-20 NOTE — PROGRESS NOTES
General Daily Progress Note    Admit Date: 9/6/2019    Subjective:     Patient has no complaint . Current Facility-Administered Medications   Medication Dose Route Frequency    fentaNYL (DURAGESIC) 75 mcg/hr patch 1 Patch  1 Patch TransDERmal Q72H    influenza vaccine 2019-20 (6 mos+)(PF) (FLUARIX/FLULAVAL/FLUZONE QUAD) injection 0.5 mL  0.5 mL IntraMUSCular PRIOR TO DISCHARGE    hydroxyurea (HYDREA) chemo cap 500 mg  500 mg Oral DAILY    HYDROcodone-acetaminophen (NORCO)  mg tablet 1 Tab  1 Tab Oral Q6H PRN    pantoprazole (PROTONIX) tablet 40 mg  40 mg Oral ACB    ondansetron (ZOFRAN ODT) tablet 4 mg  4 mg Oral Q6H PRN    polyethylene glycol (MIRALAX) packet 17 g  17 g Oral BID    magnesium hydroxide (MILK OF MAGNESIA) 400 mg/5 mL oral suspension 30 mL  30 mL Oral DAILY PRN    sodium chloride (NS) flush 5-40 mL  5-40 mL IntraVENous Q8H    sodium chloride (NS) flush 5-40 mL  5-40 mL IntraVENous PRN    acetaminophen (TYLENOL) tablet 650 mg  650 mg Oral Q4H PRN    ondansetron (ZOFRAN) injection 4 mg  4 mg IntraVENous Q4H PRN    diphenhydrAMINE (BENADRYL) injection 12.5 mg  12.5 mg IntraVENous Q4H PRN    enoxaparin (LOVENOX) injection 40 mg  40 mg SubCUTAneous Q24H        Review of Systems  A comprehensive review of systems was negative. Objective:     Patient Vitals for the past 24 hrs:   BP Temp Pulse Resp SpO2   09/19/19 1922 136/78 97.7 °F (36.5 °C) 78 18    09/19/19 1516 132/79 99.3 °F (37.4 °C) 76 18 98 %   09/19/19 1157 107/58 98 °F (36.7 °C) 78 17 99 %   09/19/19 0719 99/57 98.5 °F (36.9 °C) 76 18 100 %   09/19/19 0400 145/80 98.6 °F (37 °C) 72 18 100 %   09/18/19 2318 147/87 98.3 °F (36.8 °C) 75 18 100 %     No intake/output data recorded. 09/18 0701 - 09/19 1900  In: 2542.5 [P.O.:1240;  I.V.:1302.5]  Out: 575 [Urine:575]    Physical Exam:   Visit Vitals  /78 (BP Patient Position: At rest)   Pulse 78   Temp 97.7 °F (36.5 °C)   Resp 18   Ht 5' 10\" (1.778 m)   Wt 202 lb 13.2 oz (92 kg)   SpO2 98%   Breastfeeding? No   BMI 29.10 kg/m²     General appearance: alert, cooperative, no distress, appears stated age  Neck: supple, symmetrical, trachea midline, no adenopathy, thyroid: not enlarged, symmetric, no tenderness/mass/nodules, no carotid bruit and no JVD  Lungs: clear to auscultation bilaterally  Heart: regular rate and rhythm, S1, S2 normal, no murmur, click, rub or gallop  Abdomen: soft, non-tender. Bowel sounds normal. No masses,  no organomegaly  Extremities: extremities normal, atraumatic, no cyanosis or edema        Data Review No results found for this or any previous visit (from the past 24 hour(s)). Assessment:     Principal Problem:    Sickle cell pain crisis ([facility] [address] (9/8/2018)    Active Problems:    Hypertension (10/3/2014)      Venous insufficiency (10/3/2014)      Depression (10/5/2014)        Plan:     1. Significant improvement in pain. DC parenteral analgesics. 2.  Home tomorrow if all goes well.

## 2019-09-20 NOTE — DISCHARGE INSTRUCTIONS
General Discharge Instructions    Patient ID:  Luis Cano  [id number]  66 y.o.  1960    Patient Instructions          The following personal items were collected during your admission and were returned to you. Take Home Medications             What to do at Home    Recommended diet: Regular Diet    Recommended activity: Activity as tolerated    Follow-up with Iliana Rothman MD  in 5 days. Information obtained by :  I understand that if any problems occur once I am at home I am to contact my physician. I understand and acknowledge receipt of the instructions indicated above.                                                                                                                                            Physician's or R.N.'s Signature                                                                  Date/Time                                                                                                                                              Patient or Representative Signature                                                          Date/Time

## 2019-09-20 NOTE — PROGRESS NOTES
Problem: Falls - Risk of  Goal: *Absence of Falls  Description  Document Michele Streeter Fall Risk and appropriate interventions in the flowsheet.   Outcome: Progressing Towards Goal  Note:   Fall Risk Interventions:  Mobility Interventions: Communicate number of staff needed for ambulation/transfer         Medication Interventions: Evaluate medications/consider consulting pharmacy, Patient to call before getting OOB, Teach patient to arise slowly    Elimination Interventions: Call light in reach    History of Falls Interventions: Evaluate medications/consider consulting pharmacy         Problem: Patient Education: Go to Patient Education Activity  Goal: Patient/Family Education  Outcome: Progressing Towards Goal     Problem: Pain  Goal: *Control of Pain  Outcome: Progressing Towards Goal  Goal: *PALLIATIVE CARE:  Alleviation of Pain  Outcome: Progressing Towards Goal     Problem: Patient Education: Go to Patient Education Activity  Goal: Patient/Family Education  Outcome: Progressing Towards Goal     Problem: Patient Education: Go to Patient Education Activity  Goal: Patient/Family Education  Outcome: Progressing Towards Goal

## 2019-09-20 NOTE — PROGRESS NOTES
KRISTINA Plan:    1) Home - family to transport - patient alert and oriented and independent with all ADL's and IADL's.   2) Follow-up appt with PCP on AVS.  3) 2nd IM letter given to patient and signed copy on chart.     Bernadine Pantoja, RN, BSN, [address] [place] [address]     [phone]

## 2019-09-21 NOTE — DISCHARGE SUMMARY
Darling. Jerson Stallings     Name:  Beka Randhawa  MR#:  [id number]  :  1960  ACCOUNT #:  [de-identified]  ADMIT DATE:  2019  DISCHARGE DATE:  2019    HISTORY OF PRESENT ILLNESS:  The patient is a 70-year-old lady who has SC hemoglobinopathy, presented to the emergency room complaining of generalized pain. She was treated with no meaningful improvement and subsequently admitted for her painful sickle crisis. Unfortunately, she has had increasing frequency of hospitalized painful crisis of late. Past medical history, social history, review of systems, family history, physical examination is as in admitting H and P.    LABORATORY VALUES:  The admitting hemoglobin was 8.8, white count 8.9, MCV was 93.1, platelet count 149,715 with the following differential:  33 segs, 52 lymphocytes, 10 monocytes, 4 eosinophils, 1 basophil. At the time of discharge, hemoglobin was 8.5, white count of 9.0. Abnormalities on the comprehensive profile were limited to a bilirubin of 1.6, globulin 4.5, alkaline phosphatase 120. HOSPITAL COURSE:  The patient was admitted and placed on the floor with O2 hydration and parenteral analgesics. She was placed on Dilaudid 1 mg q. 3-4 hours along with Benadryl 25-50 mg IV and promethazine p.r.n. With this, she had adequate pain control. She was somewhat resistant to treatment, but eventually improved to the point where she was subsequently discharged. She did finally agree to take hydroxyurea and this was started to prior to discharge. She had no adverse effects on this. Hospital course is otherwise uneventful. FINAL DIAGNOSES:  1.  Painful sickle crisis. 2.  Sickle cell hemoglobinopathy. 3.  Primary hypertension. 4.  Depression. 5.  Status post cholecystectomy. DISPOSITION:  1. The patient will be discharged home, ambulatory, on a regular diet. 2.  The patient remains a full code.   3.  Discharge medications include the following, Celexa 10 mg daily, omeprazole 20 mg daily, promethazine 25 mg q. 8 hours p.r.n., telmisartan 40 mg daily, hydrocodone/acetomorphine 10/325 mg one q. 6 hours p.r.n., hydroxyurea 500 mg daily to be titrated up as an outpatient. DISCHARGE INSTRUCTIONS:  The patient will return to my office in the next 5 days.         MD ERIK [email or web address]  D:  09/20/2019 16:03  T:  09/21/2019 2:47  JOB #:  [id number]

## 2019-09-23 ENCOUNTER — PATIENT OUTREACH (OUTPATIENT)
Dept: INTERNAL MEDICINE CLINIC | Age: 59
End: 2019-09-23

## 2019-09-24 NOTE — PROGRESS NOTES
Hospital Discharge Follow-Up    [facility] [id number] -2019:  Sickle Cell Crisis Pain  Date/Time:  2019 7:18 PM    Patient was admitted to [facility] on 2019 and discharged on 2019 for sickle cell crisis pain. The physician discharge summary was available at the time of outreach. Patient was contacted within 2 business days of discharge. Top Challenges reviewed with the provider   -Palliative care consult/plan  -Nurse Specialist recommendation:  Narcan available to her and someone who can administer it in case of accidental overdose. If she is unable to afford, it is available for free if she -with a family member- attend a [employer] class. Mariah Murillo, PhD, RN, CS  Psychiatric Mental Health Clinical Nurse Specialist  , [address] [place] [address] [address] [facility] Team         Method of communication with provider :face to face. Inpatient RRAT score: 25  Was this a readmission? yes   Patient stated reason for the readmission: sickle cell crisis pain    Nurse Navigator (NN) contacted the patient by telephone to perform post hospital discharge assessment. Verified name and  with patient as identifiers. Provided introduction to self, and explanation of the Nurse Navigator role. Reviewed discharge instructions and red flags with patient who verbalized understanding. Patient given an opportunity to ask questions and does not have any further questions or concerns at this time. The patient agrees to contact the PCP office for questions related to their healthcare. NN provided contact information for future reference. Disease Specific:   none    Summary of patient's top problems:  Patient was admitted to hospital; treated with 02, IV hydration & parental analgesics, IV pain meds/IV Benadryl, dilaudid q 3-4hrs & promethazine PRN. Monica Vargas         Home Health orders at discharge: [address]: n/a  Date of initial visit: n/a    Durable Medical Equipment ordered/company: none  Durable Medical Equipment received: n/a     Barriers to care? Medication management    Advance Care Planning:   Does patient have an Advance Directive:  not on file; education provided   Healthcare Decision Maker:  Brother Jesse Franz    Medication(s):   New Medications at Discharge: hydrourea  Changed Medications at Discharge: none  Discontinued Medications at Discharge: none    Medication reconciliation was performed with patient, who verbalizes understanding of administration of home medications. There were no barriers to obtaining medications identified at this time. Referral to Pharm D needed: yes     Current Outpatient Medications   Medication Sig    hydroxyurea (HYDREA) 500 mg capsule 1 tablet daily    HYDROcodone-acetaminophen (NORCO)  mg tablet Take 1 Tab by mouth every six (6) hours as needed for Pain for up to 30 days. Max Daily Amount: 4 Tabs. Dx.D57.1  Indications: sickl;e cell crisis    hydrocortisone (CORTISONE) 1 % topical cream Apply  to affected area two (2) times daily as needed for Skin Irritation. use thin layer    telmisartan (MICARDIS) 40 mg tablet Take 1 Tab by mouth nightly.  folic acid (FOLVITE) 1 mg tablet Take 1 mg by mouth nightly.  omeprazole (PRILOSEC OTC) 20 mg tablet Take 20 mg by mouth nightly.  promethazine (PHENERGAN) 25 mg tablet Take 1 Tab by mouth every eight (8) hours as needed for Nausea.  citalopram (CELEXA) 10 mg tablet TAKE 1 TABLET BY MOUTH EVERY NIGHT AT BEDTIME     No current facility-administered medications for this visit. There are no discontinued medications. [facility] follow up appointment(s):   Future Appointments   Date Time Provider Waldo Calvo   9/26/2019  1:15 PM Darwin Lopez MD [facility] MAIN [place] [address]   10/29/2019 12:30 PM Darwin Lopez MD [place] [id number]      Non-[facility] follow up appointment(s): will discuss [facility] w/ PCP.     Dispatch Health:  n/a     Goals Addressed                 This Visit's Progress     Admission Care   On track     9/12/2019:  IBM sent [place] [id number] of patient's readmission submitted for Case Review Team today. Last call in for controlled med 9/4/2019. EW         Attends follow up appointments on schedule   On track     6/7/2019:  Patient scheduled for ROME VAUGHN appt as requested: Friday 6/14/2019:  12PM.  EW     6/25/2019:  Patient returns to f/u with PCP re crisis prevention/medication evaluation. EW     7/26/2019:  Met patient at office visit today. EW   8/12/2019: Follow KRISTINA appointment attended as scheduled. EW        Knowledge and adherence of prescribed medication (ie. action, side effects, missed dose, etc.). On track     9/23/2019:  Medication Consult. Email sent to PharmD New Ayush re narcan recommendation. EW       Patient/Family verbalizes understanding of self-management of chronic disease. On track     5/9/2019: Judy Olson 4/14-5/2/2019:  Sickle Cell Crisis f/u -see coordinate Pain Management Plan goal.  EW     6/7/2019:  Continued teachings done with rationale:    Drink plenty of fluids- dehydrated can increase your risk of a sickle crisis. Recommendation is about 8 glasses a day and drink more fluid if youre exercising or in hot weather. Sleep--encouraged to get enough sleep. Eat right-plenty of fruits, vegetables, whole grains, and protein--discussed the type meat/proteins she enjoyed; Sukhdev Roth Encouraged exercise in moderation for her; to aim to increase simply walking more around the house/any mod exercise a week;  Encouraged patient to PCP before starting a new exercise routine--Patient stated PCP has been on her at every visit to increase exercise/activity on a daily basis--discussed physical activity is key in staying healthy. NN warned against trying to  overdo it. Rest when you get tired. Take medicine as ordered.  Make sure  take your prescription medicine as directed. Get medical and lab tests that your doctor recommends. Discussion on getting the annual flu shot, and pneumococcal and meningococcal vaccines as recommended by PCP in that common illnesses, like the flu, can quickly become dangerous. Discussion on Extreme temperatures: extreme heat or cold, or any craig changes in temperatures, could set off a crisis. Reminded patient of High altitude--leading to Lack of oxygen at high altitudes could trigger a crisis. (Reminded that Planes, because theyre pressurized, shouldnt be a problem). Alcohol. It can make you dehydrated--patient denied consumptions of;    Smoking. This can trigger a lung condition called acute chest syndrome. This is when sickle cells stick together and block oxygen from getting into your lungs (even warned patient re walking in and out of stores that smokers congregate smoking). Infections. Common illnesses can be very serious for people with SCD. Wash your hands before eating or after using the bathroom. Wash your fruits and veggies, and avoid raw meat, eggs, and unpasteurized milk. Stress-  Though sometimes hard to avoid, but stress or depression can trigger a crisis (states she has not been depressed as much as usual)--advised to try to take time to relax or find techniques that help you calm down and such to prevent and/or relieve depression episodes. Patient agreed to try. EW      6/14/2019:  [facility] 5/26-6/6/2019:  Sickle Cell Crisis f/u  Patient in for f/u office visit today. 1.  Continued discussion of not being able tto obtain Fentanyl Patch & Hydrocodone simultaneously taken. Discussed the opiod crisis and what were her thoughts on the same. 2.  Supported and discussed PCP concerns for weight gain:   Eating same time each day; avoiding snacks; avoiding consumption of processed carbohydrates. Understanding assured with reiteration of advisement.   EW     6/19/2019;  Patient Teach-back done:    Drinking plenty of fluids-verbalized understanding of prevention of increased risk of a sickle crisis. States not 8 glasses yet but more than 5.  .  Sleep--states sleeping better but with medications. .  Eat right- agreed to increased buying of fruits & vegetables, whole grains, and sufficient meats without a lot of fat as protein. .States she is walking more as exercise.  -discussed physical activity is key in staying healthy. States she is getting more rest.  EW     6/25/2019:  Patient at office contact for PCP medication re-evaluation. Patient Review:  Proper diet & weight reduction as reviewed by PCP; The 3 advisements from PCP review: 1. eating meals 2.  eliminating snacks,  3. avoiding the consumption of processed carbs. Patient agrees to work on depression-continue anti-depressants as ordered. Advisement:  Eating the right foods at meals; avoiding sugary foods--advised that healthy eating affects mental and physical status; drinking sufficient water prevents dehydration. EW     8/12/2019:  Pain Coordination discussion w/ PCP. EW     9/4/2019:  Call received from patient stating she is run out of pain medication; states pharmacy states no refill on this type medication. Patient requesting refill. C/o painful joints and needs pain medication as soon as possible. Advised PCP at [facility] office and medication request for this  must go through Medication Specialist Klever Aponte; that prescriptions for controlled substance as this are not able to be simply called in, thus NN unable to do so; that Klever [place] will reach him with patient concerns hourly if need. Agrees to contact Klever Aponte. Dressing/Staying warm/drinking warm liquids discussed. Consult done w/ PCP-agreed to look at request/need later this evening. EW.      9/23/2019:  No complaints re hydroxyurea.  States diet high on the priority list.   EW       .                    Goals f/u 9/30/2019

## 2019-09-24 NOTE — PROGRESS NOTES
Hospital Discharge Follow-Up    [facility] [id number] -2019:  Sickle Cell Crisis Pain  Date/Time:  2019 7:18 PM    Patient was admitted to [facility] on 2019 and discharged on 2019 for sickle cell crisis pain. The physician discharge summary was available at the time of outreach. Patient was contacted within 2 business days of discharge. Top Challenges reviewed with the provider   -Palliative care consult/plan  -Nurse Specialist recommendation:  Narcan available to her and someone who can administer it in case of accidental overdose. If she is unable to afford, it is available for free if she -with a family member- attend a [employer]! class. Karen Del Cid, PhD, RN, CS  Psychiatric Mental Health Clinical Nurse Specialist  , [address] [place] [address] [facility] Team         Method of communication with provider :face to face. Inpatient RRAT score: 25  Was this a readmission? yes   Patient stated reason for the readmission: sickle cell crisis pain    Nurse Navigator (NN) contacted the patient by telephone to perform post hospital discharge assessment. Verified name and  with patient as identifiers. Provided introduction to self, and explanation of the Nurse Navigator role. Reviewed discharge instructions and red flags with patient who verbalized understanding. Patient given an opportunity to ask questions and does not have any further questions or concerns at this time. The patient agrees to contact the PCP office for questions related to their healthcare. NN provided contact information for future reference. Disease Specific:   none    Summary of patient's top problems:  Patient was admitted to hospital; treated with 02, IV hydration & parental analgesics, IV pain meds/IV Benadryl, dilaudid q 3-4hrs & promethazine PRN. Jb Edwards         Home Health orders at discharge: [address]: n/a  Date of initial visit: n/a    Durable Medical Equipment ordered/company: none  Durable Medical Equipment received: n/a     Barriers to care? Medication management    Advance Care Planning:   Does patient have an Advance Directive:  not on file; education provided   Healthcare Decision Maker:  Brother Alona Almaraz    Medication(s):   New Medications at Discharge: hydrourea  Changed Medications at Discharge: none  Discontinued Medications at Discharge: none    Medication reconciliation was performed with patient, who verbalizes understanding of administration of home medications. There were no barriers to obtaining medications identified at this time. Referral to Pharm D needed: yes     Current Outpatient Medications   Medication Sig    hydroxyurea (HYDREA) 500 mg capsule 1 tablet daily    HYDROcodone-acetaminophen (NORCO)  mg tablet Take 1 Tab by mouth every six (6) hours as needed for Pain for up to 30 days. Max Daily Amount: 4 Tabs. Dx.D57.1  Indications: sickl;e cell crisis    hydrocortisone (CORTISONE) 1 % topical cream Apply  to affected area two (2) times daily as needed for Skin Irritation. use thin layer    telmisartan (MICARDIS) 40 mg tablet Take 1 Tab by mouth nightly.  folic acid (FOLVITE) 1 mg tablet Take 1 mg by mouth nightly.  omeprazole (PRILOSEC OTC) 20 mg tablet Take 20 mg by mouth nightly.  promethazine (PHENERGAN) 25 mg tablet Take 1 Tab by mouth every eight (8) hours as needed for Nausea.  citalopram (CELEXA) 10 mg tablet TAKE 1 TABLET BY MOUTH EVERY NIGHT AT BEDTIME     No current facility-administered medications for this visit. There are no discontinued medications. [facility] follow up appointment(s):   Future Appointments   Date Time Provider Waldo Calvo   9/26/2019  1:15 PM Batsheva Cerna MD [facility] MAIN [place] [id number]   10/29/2019 12:30 PM Batsheva Cerna MD [place] [id number]      Non-[facility] follow up appointment(s): will discuss [facility] w/ PCP.     Dispatch Health:  n/a     Goals Addressed                 This Visit's Progress     Admission Care   On track     9/12/2019:  IBM sent [place] [id number] of patient's readmission submitted for Case Review Team today. Last call in for controlled med 9/4/2019. EW         Attends follow up appointments on schedule   On track     6/7/2019:  Patient scheduled for ROME VAUGHN appt as requested: Friday 6/14/2019:  12PM.  EW     6/25/2019:  Patient returns to f/u with PCP re crisis prevention/medication evaluation. EW     7/26/2019:  Met patient at office visit today. EW   8/12/2019: Follow KRISTINA appointment attended as scheduled. EW        Knowledge and adherence of prescribed medication (ie. action, side effects, missed dose, etc.). On track     9/23/2019:  Medication Consult. Email sent to PharmD New Ayush re narcan recommendation. EW       Patient/Family verbalizes understanding of self-management of chronic disease. On track     5/9/2019: Natali Lara 4/14-5/2/2019:  Sickle Cell Crisis f/u -see coordinate Pain Management Plan goal.  EW     6/7/2019:  Continued teachings done with rationale:    Drink plenty of fluids- dehydrated can increase your risk of a sickle crisis. Recommendation is about 8 glasses a day and drink more fluid if youre exercising or in hot weather. Sleep--encouraged to get enough sleep. Eat right-plenty of fruits, vegetables, whole grains, and protein--discussed the type meat/proteins she enjoyed; Elina Chandler Encouraged exercise in moderation for her; to aim to increase simply walking more around the house/any mod exercise a week;  Encouraged patient to PCP before starting a new exercise routine--Patient stated PCP has been on her at every visit to increase exercise/activity on a daily basis--discussed physical activity is key in staying healthy. NN warned against trying to  overdo it. Rest when you get tired. Take medicine as ordered.  Make sure  take your prescription medicine as directed. Get medical and lab tests that your doctor recommends. Discussion on getting the annual flu shot, and pneumococcal and meningococcal vaccines as recommended by PCP in that common illnesses, like the flu, can quickly become dangerous. Discussion on Extreme temperatures: extreme heat or cold, or any craig changes in temperatures, could set off a crisis. Reminded patient of High altitude--leading to Lack of oxygen at high altitudes could trigger a crisis. (Reminded that Planes, because theyre pressurized, shouldnt be a problem). Alcohol. It can make you dehydrated--patient denied consumptions of;    Smoking. This can trigger a lung condition called acute chest syndrome. This is when sickle cells stick together and block oxygen from getting into your lungs (even warned patient re walking in and out of stores that smokers congregate smoking). Infections. Common illnesses can be very serious for people with SCD. Wash your hands before eating or after using the bathroom. Wash your fruits and veggies, and avoid raw meat, eggs, and unpasteurized milk. Stress-  Though sometimes hard to avoid, but stress or depression can trigger a crisis (states she has not been depressed as much as usual)--advised to try to take time to relax or find techniques that help you calm down and such to prevent and/or relieve depression episodes. Patient agreed to try. EW      6/14/2019:  [facility] 5/26-6/6/2019:  Sickle Cell Crisis f/u  Patient in for f/u office visit today. 1.  Continued discussion of not being able tto obtain Fentanyl Patch & Hydrocodone simultaneously taken. Discussed the opiod crisis and what were her thoughts on the same. 2.  Supported and discussed PCP concerns for weight gain:   Eating same time each day; avoiding snacks; avoiding consumption of processed carbohydrates. Understanding assured with reiteration of advisement.   EW     6/19/2019;  Patient Teach-back done:    Drinking plenty of fluids-verbalized understanding of prevention of increased risk of a sickle crisis. States not 8 glasses yet but more than 5.  .  Sleep--states sleeping better but with medications. .  Eat right- agreed to increased buying of fruits & vegetables, whole grains, and sufficient meats without a lot of fat as protein. .States she is walking more as exercise.  -discussed physical activity is key in staying healthy. States she is getting more rest.  EW     6/25/2019:  Patient at office contact for PCP medication re-evaluation. Patient Review:  Proper diet & weight reduction as reviewed by PCP; The 3 advisements from PCP review: 1. eating meals 2.  eliminating snacks,  3. avoiding the consumption of processed carbs. Patient agrees to work on depression-continue anti-depressants as ordered. Advisement:  Eating the right foods at meals; avoiding sugary foods--advised that healthy eating affects mental and physical status; drinking sufficient water prevents dehydration. EW     8/12/2019:  Pain Coordination discussion w/ PCP. EW     9/4/2019:  Call received from patient stating she is run out of pain medication; states pharmacy states no refill on this type medication. Patient requesting refill. C/o painful joints and needs pain medication as soon as possible. Advised PCP at [facility] office and medication request for this  must go through Medication Specialist Vance Harding; that prescriptions for controlled substance as this are not able to be simply called in, thus NN unable to do so; that Vance Harding will reach him with patient concerns hourly if need. Agrees to contact Vance Harding. Dressing/Staying warm/drinking warm liquids discussed. Consult done w/ PCP-agreed to look at request/need later this evening. EW.      9/23/2019:  No complaints re hydroxyurea.  States diet high on the priority list.   EW       .                    Goals f/u 9/30/2019

## 2019-09-26 ENCOUNTER — OFFICE VISIT (OUTPATIENT)
Dept: INTERNAL MEDICINE CLINIC | Age: 59
End: 2019-09-26

## 2019-09-26 VITALS
BODY MASS INDEX: 29.59 KG/M2 | SYSTOLIC BLOOD PRESSURE: 132 MMHG | HEIGHT: 70 IN | OXYGEN SATURATION: 94 % | WEIGHT: 206.7 LBS | RESPIRATION RATE: 16 BRPM | TEMPERATURE: 99.1 F | DIASTOLIC BLOOD PRESSURE: 73 MMHG | HEART RATE: 83 BPM

## 2019-09-26 DIAGNOSIS — F32.A DEPRESSION, UNSPECIFIED DEPRESSION TYPE: ICD-10-CM

## 2019-09-26 DIAGNOSIS — D57.1 HB-SS DISEASE WITHOUT CRISIS (HCC): Primary | ICD-10-CM

## 2019-09-26 DIAGNOSIS — E66.3 OVERWEIGHT (BMI 25.0-29.9): ICD-10-CM

## 2019-09-26 DIAGNOSIS — I10 ESSENTIAL HYPERTENSION: ICD-10-CM

## 2019-09-26 NOTE — PROGRESS NOTES
Chief Complaint   Patient presents with    Transitions Of Care     Patient admitted to [facility] on 9/6/19 for Sickle Cell Crisis. 1. Have you been to the ER, urgent care clinic since your last visit? Hospitalized since your last visit? Yes When: 9/6/19 Where: [facility] Reason for visit: Sickle Cell Crisis    2. Have you seen or consulted any other health care providers outside of the [address] [place] [address] since your last visit? Include any pap smears or colon screening.  No

## 2019-09-27 NOTE — PROGRESS NOTES
[address] [facility]  [place] [address]  [address] [place] [address]  Phone:  [phone]  Fax: [phone]       Chief Complaint   Patient presents with    Transitions Of Care     Patient admitted to [facility] on 9/6/19 for Sickle Cell Crisis. .      SUBJECTIVE:    Kevin Ferguson is a 66 y.o. female Comes in for return visit stating that she has done reasonably well since being discharged from the hospital.  I have her on Hydroxyurea 500 mg, which she is tolerating quite well. Hopefully this will reduce her crisis, although the dose needs to be increased. She has a past history of primary hypertension, depression and overweight. Current Outpatient Medications   Medication Sig Dispense Refill    hydroxyurea (HYDREA) 500 mg capsule 1 tablet daily 30 Cap 4    HYDROcodone-acetaminophen (NORCO)  mg tablet Take 1 Tab by mouth every six (6) hours as needed for Pain for up to 30 days. Max Daily Amount: 4 Tabs. Dx.D57.1  Indications: sickl;e cell crisis 120 Tab 0    hydrocortisone (CORTISONE) 1 % topical cream Apply  to affected area two (2) times daily as needed for Skin Irritation. use thin layer 30 g 11    telmisartan (MICARDIS) 40 mg tablet Take 1 Tab by mouth nightly. 30 Tab 11    folic acid (FOLVITE) 1 mg tablet Take 1 mg by mouth nightly.  omeprazole (PRILOSEC OTC) 20 mg tablet Take 20 mg by mouth nightly.  promethazine (PHENERGAN) 25 mg tablet Take 1 Tab by mouth every eight (8) hours as needed for Nausea.  90 Tab 11    citalopram (CELEXA) 10 mg tablet TAKE 1 TABLET BY MOUTH EVERY NIGHT AT BEDTIME 90 Tab 3     Past Medical History:   Diagnosis Date    Anemia     SC hemoglobinopathy    Chronic pain     Depression     Hypertension     Liver disease     hepatitis C; pt reports \"cured\"    Sickle cell disease ([address]      Past Surgical History:   Procedure Laterality Date    BREAST SURGERY PROCEDURE UNLISTED      2 cysts removed from R breast    CHEST SURGERY PROCEDURE UNLISTED      status post thor for removal of a benign     HX BREAST BIOPSY Right 05/2007    negative    HX CHOLECYSTECTOMY      HX ORTHOPAEDIC      bony curettage of an ostial myelitis focus     Allergies   Allergen Reactions    Dilaudid [Hydromorphone (Bulk)] Itching     Can tolerate with benadryl and ondansetron    Percocet [Oxycodone-Acetaminophen] Itching         REVIEW OF SYSTEMS:  General: negative for - chills or fever  ENT: negative for - headaches, nasal congestion or tinnitus  Respiratory: negative for - cough, hemoptysis, shortness of breath or wheezing  Cardiovascular : negative for - chest pain, edema, palpitations or shortness of breath  Gastrointestinal: negative for - abdominal pain, blood in stools, heartburn or nausea/vomiting  Genito-Urinary: no dysuria, trouble voiding, or hematuria  Musculoskeletal: negative for - gait disturbance, joint pain, joint stiffness or joint swelling  Neurological: no TIA or stroke symptoms  Hematologic: no bruises, no bleeding, no swollen glands  Integument: no lumps, mole changes, nail changes or rash  Endocrine: no malaise/lethargy or unexpected weight changes      Social History     Socioeconomic History    Marital status:      Spouse name: Not on file    Number of children: 2    Years of education: Not on file    Highest education level: Not on file   Occupational History    Occupation: disabled---Sickle cell disease   Tobacco Use    Smoking status: Never Smoker    Smokeless tobacco: Never Used   Substance and Sexual Activity    Alcohol use: No    Drug use: No    Sexual activity: Yes     Partners: Male     Family History   Problem Relation Age of Onset    Hypertension Mother     Hypertension Father        OBJECTIVE:    Visit Vitals  /73   Pulse 83   Temp 99.1 °F (37.3 °C) (Oral)   Resp 16   Ht 5' 10\" (1.778 m)   Wt 206 lb 11.2 oz (93.8 kg)   SpO2 94%   BMI 29.66 kg/m²     CONSTITUTIONAL: well , well nourished, appears age appropriate  EYES: perrla, eom intact  ENMT:moist mucous membranes, pharynx clear  NECK: supple. Thyroid normal  RESPIRATORY: Chest: clear to ascultation and percussion   CARDIOVASCULAR: Heart: regular rate and rhythm  GASTROINTESTINAL: Abdomen: soft, bowel sounds active  HEMATOLOGIC: no pathological lymph nodes palpated  MUSCULOSKELETAL: Extremities: no edema, pulse 1+   INTEGUMENT: No unusual rashes or suspicious skin lesions noted. Nails appear normal.  NEUROLOGIC: non-focal exam   MENTAL STATUS: alert and oriented, appropriate affect      ASSESSMENT:  1. Hb-SS disease without crisis ([address]    2. Essential hypertension    3. Depression, unspecified depression type    4. Overweight (BMI 25.0-29. 9)        PLAN:    1. The patient will continue her analgesics for her sickle cell disease. On a return visit I will increase her Hydroxyurea to 500 mg b.i.d.  2. BP is excellent, no adjustments are made. 3. Her depression is quite stable. She is very upbeat today. 4. I encouraged her to minimize weight gain. This can be accomplished by eating meals, eliminating snacks and avoiding the consumption of processed carbohydrates. Follow-up and Dispositions    · Return in about 4 weeks (around 10/24/2019).            Yola Montero MD

## 2019-10-01 DIAGNOSIS — D57.00 SICKLE CELL CRISIS (HCC): Primary | ICD-10-CM

## 2019-10-01 NOTE — TELEPHONE ENCOUNTER
Pt stated she is going out of town to [place] on Saturday and needed the Rx so she can fill it when she is finished with her other Rx

## 2019-10-02 ENCOUNTER — PATIENT OUTREACH (OUTPATIENT)
Dept: INTERNAL MEDICINE CLINIC | Age: 59
End: 2019-10-02

## 2019-10-02 ENCOUNTER — TELEPHONE (OUTPATIENT)
Dept: INTERNAL MEDICINE CLINIC | Age: 59
End: 2019-10-02

## 2019-10-02 RX ORDER — FENTANYL 75 UG/H
1 PATCH TRANSDERMAL
Qty: 10 PATCH | Refills: 0 | Status: SHIPPED | OUTPATIENT
Start: 2019-10-02 | End: 2019-11-01

## 2019-10-02 NOTE — PROGRESS NOTES
Goals      Admission Care      9/12/2019:  [employer] sent to ED Readmit Prevention [place] [id number] of patient's readmission submitted for Case Review Team today. Last call in for controlled med 9/4/2019. EW         Attends follow up appointments on schedule      6/7/2019:  Patient scheduled for ROME VAUGHN appt as requested: Friday 6/14/2019:  12PM.  EW     6/25/2019:  Patient returns to f/u with PCP re crisis prevention/medication evaluation. EW     7/26/2019:  Met patient at office visit today. EW   8/12/2019: Follow KRISTINA appointment attended as scheduled. EW        Facilitate transitions of care (ie. palliative care, assisted living, nursing home, changes in living arrangements). 9/19/2019:  Consult w/ PCP for Ambulatory Pre D/C Planning. Consult done w/ Inpatient CM Ana Maria re d/c plans/concerns of patient; none specific-depression screening. NN spoke with Charge Nurse [address]; states depression screen is done under the admissions database questionaire;  Review of questionairre done: specifically suicide in nature. NN contacted patient for ambulatory pre d/c concerns re medication as previously experienced with [facility] . Patient denied any forseen concerns or problems w/ the needed pain medications or living arrangement. Depression screening done from the 2 PQ screening. Patient denied depression; began telling how she has a daughter and grandchildren here in town that is the center of her debra, and another one on the way within months. States also son & wife who lives in [place] is also expecting a new baby which she is looking forward to going down the first of Nov.  Patient states she has too much going on to be depressed to the point of not wanting to do anything; states the depression she experiences is when she is in \"this hospital and can't get out. .. \".  Patient states she sees her parents who are still living and doing well;  States she has lots of girls friends and they do a lot together--which she is missing out on. Patient teach-back done on the new medication: hydroxyurea. As previously informed, patient again states all the side effects she heard about, that when it first was offered her at [facility], it was primarily used for chemotherapy with multiple side effects but states now having tried everything else, and her PCP was comfortable in her trying; she states why not. .her son & daughter agrees with her trying it out to see how it is tolerated. Discussed the known side effects the hydroxyurea may bring, but she is willing to give it a try. Patient sounding upbeat and stating she is ready to go home; that she has a lot to look forward to. Patient further discussed weight loss plan ( as PCP had advised during office visits):  States she will give up drinking sodas-replacing with water and less eating out/buying prepared foods and begin to eat more healthy in light of \"foods that build Vit B levels in trying to work with this new medicine\". NN will continue to f/u post d/c.   EW        Knowledge and adherence of prescribed medication (ie. action, side effects, missed dose, etc.).      9/23/2019:  Medication Consult. Email sent to PharmD [place]ush re narcan recommendation. EW    10/2/2019:  Patient discussion centered around new med Hydroxyurea-denied any problems at all; however, PCP in office note states dosage needs to be higher--for this patient the titration probably is best.  Discussed side effects and when to contact the PCP:   fever, chills or sore throat, or other symptoms of a cold or flu. Do not treat self; decreases your body's ability to fight infections discussed. Patient states going out of town in 3 days; advised to try to avoid being around people who are sick.   Also discussed nolaxone (narcan); patient stated she has been informed and knows about the medication and the usage for over taking pain meds, but states does not feel she takes that much, plus states she knows when to slack off and take less. Will continue to f/u.  EW        Patient/Family verbalizes understanding of self-management of chronic disease. 5/9/2019: [facility] 4/14-5/2/2019:  Sickle Cell Crisis f/u -see coordinate Pain Management Plan goal.  EW     6/7/2019:  Continued teachings done with rationale:    Drink plenty of fluids- dehydrated can increase your risk of a sickle crisis. Recommendation is about 8 glasses a day and drink more fluid if youre exercising or in hot weather. Sleep--encouraged to get enough sleep. Eat right-plenty of fruits, vegetables, whole grains, and protein--discussed the type meat/proteins she enjoyed; Tiffanie Albrecht Encouraged exercise in moderation for her; to aim to increase simply walking more around the house/any mod exercise a week;  Encouraged patient to PCP before starting a new exercise routine--Patient stated PCP has been on her at every visit to increase exercise/activity on a daily basis--discussed physical activity is key in staying healthy. NN warned against trying to  overdo it. Rest when you get tired. Take medicine as ordered. Make sure  take your prescription medicine as directed. Get medical and lab tests that your doctor recommends. Discussion on getting the annual flu shot, and pneumococcal and meningococcal vaccines as recommended by PCP in that common illnesses, like the flu, can quickly become dangerous. Discussion on Extreme temperatures: extreme heat or cold, or any craig changes in temperatures, could set off a crisis. Reminded patient of High altitude--leading to Lack of oxygen at high altitudes could trigger a crisis. (Reminded that Planes, because theyre pressurized, shouldnt be a problem). Alcohol. It can make you dehydrated--patient denied consumptions of;    Smoking. This can trigger a lung condition called acute chest syndrome.  This is when sickle cells stick together and block oxygen from getting into your lungs (even warned patient re walking in and out of stores that smokers congregate smoking). Infections. Common illnesses can be very serious for people with SCD. Wash your hands before eating or after using the bathroom. Wash your fruits and veggies, and avoid raw meat, eggs, and unpasteurized milk. Stress-  Though sometimes hard to avoid, but stress or depression can trigger a crisis (states she has not been depressed as much as usual)--advised to try to take time to relax or find techniques that help you calm down and such to prevent and/or relieve depression episodes. Patient agreed to try. EW      6/14/2019:  [facility] 5/26-6/6/2019:  Sickle Cell Crisis f/u  Patient in for f/u office visit today. 1.  Continued discussion of not being able tto obtain Fentanyl Patch & Hydrocodone simultaneously taken. Discussed the opiod crisis and what were her thoughts on the same. 2.  Supported and discussed PCP concerns for weight gain:   Eating same time each day; avoiding snacks; avoiding consumption of processed carbohydrates. Understanding assured with reiteration of advisement. EW     6/19/2019;  Patient Teach-back done:    Drinking plenty of fluids-verbalized understanding of prevention of increased risk of a sickle crisis. States not 8 glasses yet but more than 5.  .  Sleep--states sleeping better but with medications. .  Eat right- agreed to increased buying of fruits & vegetables, whole grains, and sufficient meats without a lot of fat as protein. .States she is walking more as exercise.  -discussed physical activity is key in staying healthy. States she is getting more rest.  EW     6/25/2019:  Patient at office contact for PCP medication re-evaluation. Patient Review:  Proper diet & weight reduction as reviewed by PCP; The 3 advisements from PCP review: 1. eating meals 2.  eliminating snacks,  3. avoiding the consumption of processed carbs. Patient agrees to work on depression-continue anti-depressants as ordered. Advisement:  Eating the right foods at meals; avoiding sugary foods--advised that healthy eating affects mental and physical status; drinking sufficient water prevents dehydration. EW     8/12/2019:  Pain Coordination discussion w/ PCP. EW     9/4/2019:  Call received from patient stating she is run out of pain medication; states pharmacy states no refill on this type medication. Patient requesting refill. C/o painful joints and needs pain medication as soon as possible. Advised PCP at [facility] office and medication request for this  must go through Medication Specialist Chandler Blue; that prescriptions for controlled substance as this are not able to be simply called in, thus NN unable to do so; that Chandler Blue will reach him with patient concerns hourly if need. Agrees to contact Chandler Blue. Dressing/Staying warm/drinking warm liquids discussed. Consult done w/ PCP-agreed to look at request/need later this evening. EW.      9/23/2019:  No complaints re hydroxyurea.  States diet high on the priority list.   EW       .                  goal f/u:  10/16/2019: case closure 10/2019

## 2019-10-03 ENCOUNTER — HOSPITAL ENCOUNTER (INPATIENT)
Age: 59
LOS: 13 days | Discharge: HOME | DRG: 812 | End: 2019-10-17
Attending: EMERGENCY MEDICINE | Admitting: INTERNAL MEDICINE
Payer: MEDICARE

## 2019-10-03 DIAGNOSIS — D57.1 HB-SS DISEASE WITHOUT CRISIS (HCC): ICD-10-CM

## 2019-10-03 DIAGNOSIS — D57.00 SICKLE CELL PAIN CRISIS (HCC): Primary | ICD-10-CM

## 2019-10-03 LAB
ALBUMIN SERPL-MCNC: 4.1 G/DL (ref 3.5–5)
ALBUMIN/GLOB SERPL: 0.9 {RATIO} (ref 1.1–2.2)
ALP SERPL-CCNC: 108 U/L (ref 45–117)
ALT SERPL-CCNC: 17 U/L (ref 12–78)
ANION GAP SERPL CALC-SCNC: 6 MMOL/L (ref 5–15)
AST SERPL-CCNC: 70 U/L (ref 15–37)
BASOPHILS # BLD: 0.1 K/UL (ref 0–0.1)
BASOPHILS NFR BLD: 1 % (ref 0–1)
BILIRUB SERPL-MCNC: 1.7 MG/DL (ref 0.2–1)
BUN SERPL-MCNC: 15 MG/DL (ref 6–20)
BUN/CREAT SERPL: 15 (ref 12–20)
CALCIUM SERPL-MCNC: 8.4 MG/DL (ref 8.5–10.1)
CHLORIDE SERPL-SCNC: 108 MMOL/L (ref 97–108)
CO2 SERPL-SCNC: 23 MMOL/L (ref 21–32)
CREAT SERPL-MCNC: 1.02 MG/DL (ref 0.55–1.02)
DIFFERENTIAL METHOD BLD: ABNORMAL
EOSINOPHIL # BLD: 0.1 K/UL (ref 0–0.4)
EOSINOPHIL NFR BLD: 1 % (ref 0–7)
ERYTHROCYTE [DISTWIDTH] IN BLOOD BY AUTOMATED COUNT: 17 % (ref 11.5–14.5)
GLOBULIN SER CALC-MCNC: 4.8 G/DL (ref 2–4)
GLUCOSE SERPL-MCNC: 90 MG/DL (ref 65–100)
HCT VFR BLD AUTO: 23.5 % (ref 35–47)
HGB BLD-MCNC: 8 G/DL (ref 11.5–16)
IMM GRANULOCYTES # BLD AUTO: 0.2 K/UL (ref 0–0.04)
IMM GRANULOCYTES NFR BLD AUTO: 2 % (ref 0–0.5)
LYMPHOCYTES # BLD: 2.6 K/UL (ref 0.8–3.5)
LYMPHOCYTES NFR BLD: 21 % (ref 12–49)
MCH RBC QN AUTO: 33.5 PG (ref 26–34)
MCHC RBC AUTO-ENTMCNC: 34 G/DL (ref 30–36.5)
MCV RBC AUTO: 98.3 FL (ref 80–99)
MONOCYTES # BLD: 1.2 K/UL (ref 0–1)
MONOCYTES NFR BLD: 10 % (ref 5–13)
NEUTS SEG # BLD: 8.1 K/UL (ref 1.8–8)
NEUTS SEG NFR BLD: 65 % (ref 32–75)
NRBC # BLD: 0.36 K/UL (ref 0–0.01)
NRBC BLD-RTO: 2.9 PER 100 WBC
PLATELET # BLD AUTO: 221 K/UL (ref 150–400)
PMV BLD AUTO: 12 FL (ref 8.9–12.9)
POTASSIUM SERPL-SCNC: 4.5 MMOL/L (ref 3.5–5.1)
PROT SERPL-MCNC: 8.9 G/DL (ref 6.4–8.2)
RBC # BLD AUTO: 2.39 M/UL (ref 3.8–5.2)
RBC MORPH BLD: ABNORMAL
RETICS # AUTO: 0.14 M/UL (ref 0.02–0.08)
RETICS/RBC NFR AUTO: 6.1 % (ref 0.7–2.1)
SODIUM SERPL-SCNC: 137 MMOL/L (ref 136–145)
TROPONIN I SERPL-MCNC: <0.05 NG/ML
WBC # BLD AUTO: 12.3 K/UL (ref 3.6–11)

## 2019-10-03 PROCEDURE — 96374 THER/PROPH/DIAG INJ IV PUSH: CPT

## 2019-10-03 PROCEDURE — 93005 ELECTROCARDIOGRAM TRACING: CPT

## 2019-10-03 PROCEDURE — 36415 COLL VENOUS BLD VENIPUNCTURE: CPT

## 2019-10-03 PROCEDURE — 74011250636 HC RX REV CODE- 250/636: Performed by: EMERGENCY MEDICINE

## 2019-10-03 PROCEDURE — 80053 COMPREHEN METABOLIC PANEL: CPT

## 2019-10-03 PROCEDURE — 85045 AUTOMATED RETICULOCYTE COUNT: CPT

## 2019-10-03 PROCEDURE — 84484 ASSAY OF TROPONIN QUANT: CPT

## 2019-10-03 PROCEDURE — 96375 TX/PRO/DX INJ NEW DRUG ADDON: CPT

## 2019-10-03 PROCEDURE — 85025 COMPLETE CBC W/AUTO DIFF WBC: CPT

## 2019-10-03 PROCEDURE — 99285 EMERGENCY DEPT VISIT HI MDM: CPT

## 2019-10-03 RX ORDER — HYDROMORPHONE HYDROCHLORIDE 1 MG/ML
1 INJECTION, SOLUTION INTRAMUSCULAR; INTRAVENOUS; SUBCUTANEOUS ONCE
Status: COMPLETED | OUTPATIENT
Start: 2019-10-03 | End: 2019-10-03

## 2019-10-03 RX ORDER — DIPHENHYDRAMINE HYDROCHLORIDE 50 MG/ML
25 INJECTION, SOLUTION INTRAMUSCULAR; INTRAVENOUS
Status: COMPLETED | OUTPATIENT
Start: 2019-10-03 | End: 2019-10-03

## 2019-10-03 RX ORDER — ONDANSETRON 2 MG/ML
4 INJECTION INTRAMUSCULAR; INTRAVENOUS
Status: COMPLETED | OUTPATIENT
Start: 2019-10-03 | End: 2019-10-03

## 2019-10-03 RX ADMIN — ONDANSETRON 4 MG: 2 INJECTION INTRAMUSCULAR; INTRAVENOUS at 21:05

## 2019-10-03 RX ADMIN — HYDROMORPHONE HYDROCHLORIDE 1 MG: 1 INJECTION, SOLUTION INTRAMUSCULAR; INTRAVENOUS; SUBCUTANEOUS at 21:05

## 2019-10-03 RX ADMIN — DIPHENHYDRAMINE HYDROCHLORIDE 25 MG: 50 INJECTION, SOLUTION INTRAMUSCULAR; INTRAVENOUS at 21:05

## 2019-10-03 RX ADMIN — HYDROMORPHONE HYDROCHLORIDE 1 MG: 1 INJECTION, SOLUTION INTRAMUSCULAR; INTRAVENOUS; SUBCUTANEOUS at 23:28

## 2019-10-03 RX ADMIN — SODIUM CHLORIDE 1000 ML: 900 INJECTION, SOLUTION INTRAVENOUS at 20:32

## 2019-10-04 ENCOUNTER — APPOINTMENT (OUTPATIENT)
Dept: GENERAL RADIOLOGY | Age: 59
DRG: 812 | End: 2019-10-04
Attending: INTERNAL MEDICINE
Payer: MEDICARE

## 2019-10-04 ENCOUNTER — APPOINTMENT (OUTPATIENT)
Dept: GENERAL RADIOLOGY | Age: 59
DRG: 812 | End: 2019-10-04
Attending: EMERGENCY MEDICINE
Payer: MEDICARE

## 2019-10-04 LAB
APPEARANCE UR: CLEAR
ATRIAL RATE: 96 BPM
BACTERIA URNS QL MICRO: NEGATIVE /HPF
BILIRUB UR QL: NEGATIVE
CALCULATED P AXIS, ECG09: 58 DEGREES
CALCULATED R AXIS, ECG10: 45 DEGREES
CALCULATED T AXIS, ECG11: 39 DEGREES
COLOR UR: ABNORMAL
DIAGNOSIS, 93000: NORMAL
EPITH CASTS URNS QL MICRO: ABNORMAL /LPF
GLUCOSE UR STRIP.AUTO-MCNC: NEGATIVE MG/DL
HGB UR QL STRIP: NEGATIVE
KETONES UR QL STRIP.AUTO: NEGATIVE MG/DL
LACTATE SERPL-SCNC: 0.8 MMOL/L (ref 0.4–2)
LEUKOCYTE ESTERASE UR QL STRIP.AUTO: NEGATIVE
NITRITE UR QL STRIP.AUTO: NEGATIVE
P-R INTERVAL, ECG05: 154 MS
PH UR STRIP: 6 [PH] (ref 5–8)
PROT UR STRIP-MCNC: NEGATIVE MG/DL
Q-T INTERVAL, ECG07: 334 MS
QRS DURATION, ECG06: 76 MS
QTC CALCULATION (BEZET), ECG08: 421 MS
RBC #/AREA URNS HPF: ABNORMAL /HPF (ref 0–5)
SP GR UR REFRACTOMETRY: 1.01 (ref 1–1.03)
UROBILINOGEN UR QL STRIP.AUTO: 4 EU/DL (ref 0.2–1)
VENTRICULAR RATE, ECG03: 96 BPM
WBC URNS QL MICRO: ABNORMAL /HPF (ref 0–4)

## 2019-10-04 PROCEDURE — 74011250637 HC RX REV CODE- 250/637: Performed by: INTERNAL MEDICINE

## 2019-10-04 PROCEDURE — 96376 TX/PRO/DX INJ SAME DRUG ADON: CPT

## 2019-10-04 PROCEDURE — 81001 URINALYSIS AUTO W/SCOPE: CPT

## 2019-10-04 PROCEDURE — 74011250636 HC RX REV CODE- 250/636: Performed by: INTERNAL MEDICINE

## 2019-10-04 PROCEDURE — 83605 ASSAY OF LACTIC ACID: CPT

## 2019-10-04 PROCEDURE — 94760 N-INVAS EAR/PLS OXIMETRY 1: CPT

## 2019-10-04 PROCEDURE — 71045 X-RAY EXAM CHEST 1 VIEW: CPT

## 2019-10-04 PROCEDURE — 87040 BLOOD CULTURE FOR BACTERIA: CPT

## 2019-10-04 PROCEDURE — 36415 COLL VENOUS BLD VENIPUNCTURE: CPT

## 2019-10-04 PROCEDURE — 74011250636 HC RX REV CODE- 250/636: Performed by: EMERGENCY MEDICINE

## 2019-10-04 PROCEDURE — 74011000258 HC RX REV CODE- 258: Performed by: INTERNAL MEDICINE

## 2019-10-04 PROCEDURE — 65660000000 HC RM CCU STEPDOWN

## 2019-10-04 RX ORDER — ENOXAPARIN SODIUM 100 MG/ML
40 INJECTION SUBCUTANEOUS EVERY 24 HOURS
Status: DISCONTINUED | OUTPATIENT
Start: 2019-10-04 | End: 2019-10-17 | Stop reason: HOSPADM

## 2019-10-04 RX ORDER — ACETAMINOPHEN 325 MG/1
650 TABLET ORAL
Status: DISCONTINUED | OUTPATIENT
Start: 2019-10-04 | End: 2019-10-17 | Stop reason: HOSPADM

## 2019-10-04 RX ORDER — SODIUM CHLORIDE 0.9 % (FLUSH) 0.9 %
5-40 SYRINGE (ML) INJECTION AS NEEDED
Status: DISCONTINUED | OUTPATIENT
Start: 2019-10-04 | End: 2019-10-17 | Stop reason: HOSPADM

## 2019-10-04 RX ORDER — ONDANSETRON 2 MG/ML
4 INJECTION INTRAMUSCULAR; INTRAVENOUS
Status: DISCONTINUED | OUTPATIENT
Start: 2019-10-04 | End: 2019-10-17 | Stop reason: HOSPADM

## 2019-10-04 RX ORDER — NALOXONE HYDROCHLORIDE 0.4 MG/ML
0.4 INJECTION, SOLUTION INTRAMUSCULAR; INTRAVENOUS; SUBCUTANEOUS AS NEEDED
Status: DISCONTINUED | OUTPATIENT
Start: 2019-10-04 | End: 2019-10-17 | Stop reason: HOSPADM

## 2019-10-04 RX ORDER — BISACODYL 5 MG
5 TABLET, DELAYED RELEASE (ENTERIC COATED) ORAL DAILY PRN
Status: DISCONTINUED | OUTPATIENT
Start: 2019-10-04 | End: 2019-10-17 | Stop reason: HOSPADM

## 2019-10-04 RX ORDER — FOLIC ACID 1 MG/1
1 TABLET ORAL
Status: DISCONTINUED | OUTPATIENT
Start: 2019-10-04 | End: 2019-10-17 | Stop reason: HOSPADM

## 2019-10-04 RX ORDER — HYDROMORPHONE HYDROCHLORIDE 1 MG/ML
1 INJECTION, SOLUTION INTRAMUSCULAR; INTRAVENOUS; SUBCUTANEOUS ONCE
Status: COMPLETED | OUTPATIENT
Start: 2019-10-04 | End: 2019-10-04

## 2019-10-04 RX ORDER — VANCOMYCIN 2 GRAM/500 ML IN 0.9 % SODIUM CHLORIDE INTRAVENOUS
2000 ONCE
Status: COMPLETED | OUTPATIENT
Start: 2019-10-04 | End: 2019-10-04

## 2019-10-04 RX ORDER — HYDROMORPHONE HYDROCHLORIDE 1 MG/ML
1 INJECTION, SOLUTION INTRAMUSCULAR; INTRAVENOUS; SUBCUTANEOUS
Status: DISCONTINUED | OUTPATIENT
Start: 2019-10-04 | End: 2019-10-14

## 2019-10-04 RX ORDER — SODIUM CHLORIDE 0.9 % (FLUSH) 0.9 %
5-40 SYRINGE (ML) INJECTION EVERY 8 HOURS
Status: DISCONTINUED | OUTPATIENT
Start: 2019-10-04 | End: 2019-10-17 | Stop reason: HOSPADM

## 2019-10-04 RX ORDER — HYDROXYUREA 500 MG/1
500 CAPSULE ORAL DAILY
Status: DISCONTINUED | OUTPATIENT
Start: 2019-10-04 | End: 2019-10-14

## 2019-10-04 RX ORDER — HYDROMORPHONE HYDROCHLORIDE 1 MG/ML
1 INJECTION, SOLUTION INTRAMUSCULAR; INTRAVENOUS; SUBCUTANEOUS
Status: DISCONTINUED | OUTPATIENT
Start: 2019-10-04 | End: 2019-10-04

## 2019-10-04 RX ORDER — HYDROMORPHONE HYDROCHLORIDE 1 MG/ML
1 INJECTION, SOLUTION INTRAMUSCULAR; INTRAVENOUS; SUBCUTANEOUS
Status: COMPLETED | OUTPATIENT
Start: 2019-10-04 | End: 2019-10-04

## 2019-10-04 RX ORDER — LOSARTAN POTASSIUM 50 MG/1
50 TABLET ORAL
Status: DISCONTINUED | OUTPATIENT
Start: 2019-10-04 | End: 2019-10-17 | Stop reason: HOSPADM

## 2019-10-04 RX ORDER — SODIUM CHLORIDE 9 MG/ML
100 INJECTION, SOLUTION INTRAVENOUS CONTINUOUS
Status: DISCONTINUED | OUTPATIENT
Start: 2019-10-04 | End: 2019-10-04

## 2019-10-04 RX ORDER — TELMISARTAN 40 MG/1
40 TABLET ORAL
Status: DISCONTINUED | OUTPATIENT
Start: 2019-10-04 | End: 2019-10-04 | Stop reason: CLARIF

## 2019-10-04 RX ORDER — DIPHENHYDRAMINE HYDROCHLORIDE 50 MG/ML
25 INJECTION, SOLUTION INTRAMUSCULAR; INTRAVENOUS
Status: DISCONTINUED | OUTPATIENT
Start: 2019-10-04 | End: 2019-10-17 | Stop reason: HOSPADM

## 2019-10-04 RX ORDER — PANTOPRAZOLE SODIUM 40 MG/1
40 TABLET, DELAYED RELEASE ORAL
Status: DISCONTINUED | OUTPATIENT
Start: 2019-10-04 | End: 2019-10-17 | Stop reason: HOSPADM

## 2019-10-04 RX ORDER — DEXTROSE, SODIUM CHLORIDE, AND POTASSIUM CHLORIDE 5; .45; .15 G/100ML; G/100ML; G/100ML
125 INJECTION INTRAVENOUS CONTINUOUS
Status: DISCONTINUED | OUTPATIENT
Start: 2019-10-04 | End: 2019-10-05

## 2019-10-04 RX ORDER — CITALOPRAM 20 MG/1
10 TABLET, FILM COATED ORAL DAILY
Status: DISCONTINUED | OUTPATIENT
Start: 2019-10-04 | End: 2019-10-17 | Stop reason: HOSPADM

## 2019-10-04 RX ADMIN — DEXTROSE MONOHYDRATE, SODIUM CHLORIDE, AND POTASSIUM CHLORIDE 125 ML/HR: 50; 4.5; 1.49 INJECTION, SOLUTION INTRAVENOUS at 21:45

## 2019-10-04 RX ADMIN — Medication 10 ML: at 05:45

## 2019-10-04 RX ADMIN — SODIUM CHLORIDE 100 ML/HR: 900 INJECTION, SOLUTION INTRAVENOUS at 05:42

## 2019-10-04 RX ADMIN — HYDROMORPHONE HYDROCHLORIDE 1 MG: 1 INJECTION, SOLUTION INTRAMUSCULAR; INTRAVENOUS; SUBCUTANEOUS at 14:42

## 2019-10-04 RX ADMIN — ACETAMINOPHEN 650 MG: 325 TABLET ORAL at 17:41

## 2019-10-04 RX ADMIN — SODIUM CHLORIDE 100 ML/HR: 900 INJECTION, SOLUTION INTRAVENOUS at 16:44

## 2019-10-04 RX ADMIN — CEFTRIAXONE 1 G: 1 INJECTION, POWDER, FOR SOLUTION INTRAMUSCULAR; INTRAVENOUS at 17:42

## 2019-10-04 RX ADMIN — Medication 10 ML: at 08:54

## 2019-10-04 RX ADMIN — CITALOPRAM HYDROBROMIDE 10 MG: 20 TABLET, FILM COATED ORAL at 08:54

## 2019-10-04 RX ADMIN — FOLIC ACID 1 MG: 1 TABLET ORAL at 21:42

## 2019-10-04 RX ADMIN — ENOXAPARIN SODIUM 40 MG: 40 INJECTION, SOLUTION INTRAVENOUS; SUBCUTANEOUS at 05:42

## 2019-10-04 RX ADMIN — ONDANSETRON HYDROCHLORIDE 4 MG: 2 INJECTION, SOLUTION INTRAMUSCULAR; INTRAVENOUS at 17:41

## 2019-10-04 RX ADMIN — VANCOMYCIN HYDROCHLORIDE 2000 MG: 10 INJECTION, POWDER, LYOPHILIZED, FOR SOLUTION INTRAVENOUS at 19:17

## 2019-10-04 RX ADMIN — HYDROMORPHONE HYDROCHLORIDE 1 MG: 1 INJECTION, SOLUTION INTRAMUSCULAR; INTRAVENOUS; SUBCUTANEOUS at 11:43

## 2019-10-04 RX ADMIN — DIPHENHYDRAMINE HYDROCHLORIDE 25 MG: 50 INJECTION, SOLUTION INTRAMUSCULAR; INTRAVENOUS at 11:43

## 2019-10-04 RX ADMIN — HYDROMORPHONE HYDROCHLORIDE 1 MG: 1 INJECTION, SOLUTION INTRAMUSCULAR; INTRAVENOUS; SUBCUTANEOUS at 19:58

## 2019-10-04 RX ADMIN — HYDROMORPHONE HYDROCHLORIDE 1 MG: 1 INJECTION, SOLUTION INTRAMUSCULAR; INTRAVENOUS; SUBCUTANEOUS at 23:04

## 2019-10-04 RX ADMIN — HYDROMORPHONE HYDROCHLORIDE 1 MG: 1 INJECTION, SOLUTION INTRAMUSCULAR; INTRAVENOUS; SUBCUTANEOUS at 17:11

## 2019-10-04 RX ADMIN — HYDROXYUREA 500 MG: 500 CAPSULE ORAL at 11:05

## 2019-10-04 RX ADMIN — LOSARTAN POTASSIUM 50 MG: 50 TABLET, FILM COATED ORAL at 21:41

## 2019-10-04 RX ADMIN — PANTOPRAZOLE SODIUM 40 MG: 40 TABLET, DELAYED RELEASE ORAL at 21:41

## 2019-10-04 RX ADMIN — DIPHENHYDRAMINE HYDROCHLORIDE 25 MG: 50 INJECTION, SOLUTION INTRAMUSCULAR; INTRAVENOUS at 23:04

## 2019-10-04 RX ADMIN — HYDROMORPHONE HYDROCHLORIDE 1 MG: 1 INJECTION, SOLUTION INTRAMUSCULAR; INTRAVENOUS; SUBCUTANEOUS at 00:40

## 2019-10-04 RX ADMIN — DIPHENHYDRAMINE HYDROCHLORIDE 25 MG: 50 INJECTION, SOLUTION INTRAMUSCULAR; INTRAVENOUS at 17:42

## 2019-10-04 RX ADMIN — HYDROMORPHONE HYDROCHLORIDE 1 MG: 1 INJECTION, SOLUTION INTRAMUSCULAR; INTRAVENOUS; SUBCUTANEOUS at 08:55

## 2019-10-04 RX ADMIN — Medication 10 ML: at 11:44

## 2019-10-04 RX ADMIN — HYDROMORPHONE HYDROCHLORIDE 1 MG: 1 INJECTION, SOLUTION INTRAMUSCULAR; INTRAVENOUS; SUBCUTANEOUS at 03:47

## 2019-10-04 RX ADMIN — Medication 10 ML: at 14:42

## 2019-10-04 RX ADMIN — ONDANSETRON HYDROCHLORIDE 4 MG: 2 INJECTION, SOLUTION INTRAMUSCULAR; INTRAVENOUS at 05:43

## 2019-10-04 RX ADMIN — Medication 10 ML: at 21:42

## 2019-10-04 RX ADMIN — ACETAMINOPHEN 650 MG: 325 TABLET ORAL at 21:41

## 2019-10-04 RX ADMIN — DIPHENHYDRAMINE HYDROCHLORIDE 25 MG: 50 INJECTION, SOLUTION INTRAMUSCULAR; INTRAVENOUS at 05:43

## 2019-10-04 RX ADMIN — ONDANSETRON HYDROCHLORIDE 4 MG: 2 INJECTION, SOLUTION INTRAMUSCULAR; INTRAVENOUS at 11:43

## 2019-10-04 NOTE — ED PROVIDER NOTES
EMERGENCY DEPARTMENT HISTORY AND PHYSICAL EXAM      Date: 10/3/2019  Patient Name: Jayshree Hutton  Patient Age and Sex: 64 y.o. female    History of Presenting Illness     Chief Complaint   Patient presents with    Sickle Cell Crisis     onset of sxs this AM and worsening - reports recently be admitted for similar sxs - reports having lower back pain / chest discomfort - Denies N/ V / D / Fevers / Abd pain / Fevers / SOB         History Provided By: Patient    HPI: Jayshree Hutton, is a 64 y.o. female with a past medical history of sickle cell, presents and typical crisis. She reports pain in her upper back and both upper extremities. This is how her crisis usually presents. No fevers or chills. She takes hydromorphone at home and despite using the recommended doses, she has not been able to control her pain. No cough, shortness of breath. Last transfusion was in May of this year. Pt denies any other alleviating or exacerbating factors. There are no other complaints, changes or physical findings at this time. Of note, the patient has a management plan on file. I have reviewed this plan with her.     Past Medical History:   Diagnosis Date    Anemia     SC hemoglobinopathy    Chronic pain     Depression     Hypertension     Liver disease     hepatitis C; pt reports \"cured\"    Sickle cell disease ([address]      Past Surgical History:   Procedure Laterality Date    BREAST SURGERY PROCEDURE UNLISTED      2 cysts removed from R breast    CHEST SURGERY PROCEDURE UNLISTED      status post thor for removal of a benign     HX BREAST BIOPSY Right 05/2007    negative    HX CHOLECYSTECTOMY      HX ORTHOPAEDIC      bony curettage of an ostial myelitis focus       PCP: Lilia Kelley MD    Past History   Past Medical History:  Past Medical History:   Diagnosis Date    Anemia     SC hemoglobinopathy    Chronic pain     Depression     Hypertension     Liver disease     hepatitis C; pt reports \"cured\"    Sickle cell [address] [address]        Past Surgical History:  Past Surgical History:   Procedure Laterality Date    BREAST SURGERY PROCEDURE UNLISTED      2 cysts removed from R breast    CHEST SURGERY PROCEDURE UNLISTED      status post thor for removal of a benign     HX BREAST BIOPSY Right 05/2007    negative    HX CHOLECYSTECTOMY      HX ORTHOPAEDIC      bony curettage of an ostial myelitis focus       Family History:  Family History   Problem Relation Age of Onset    Hypertension Mother     Hypertension Father        Social History:  Social History     Tobacco Use    Smoking status: Never Smoker    Smokeless tobacco: Never Used   Substance Use Topics    Alcohol use: No    Drug use: No       Allergies: Allergies   Allergen Reactions    Dilaudid [Hydromorphone (Bulk)] Itching     Can tolerate with benadryl and ondansetron    Percocet [Oxycodone-Acetaminophen] Itching       Current Medications:  No current facility-administered medications on file prior to encounter. Current Outpatient Medications on File Prior to Encounter   Medication Sig Dispense Refill    fentaNYL (DURAGESIC) 75 mcg/hr 1 Patch by TransDERmal route every seventy-two (72) hours for 30 days. Max Daily Amount: 1 Patch. 10 Patch 0    hydroxyurea (HYDREA) 500 mg capsule 1 tablet daily 30 Cap 4    HYDROcodone-acetaminophen (NORCO)  mg tablet Take 1 Tab by mouth every six (6) hours as needed for Pain for up to 30 days. Max Daily Amount: 4 Tabs. Dx.D57.1  Indications: sickl;e cell crisis 120 Tab 0    hydrocortisone (CORTISONE) 1 % topical cream Apply  to affected area two (2) times daily as needed for Skin Irritation. use thin layer 30 g 11    telmisartan (MICARDIS) 40 mg tablet Take 1 Tab by mouth nightly. 30 Tab 11    folic acid (FOLVITE) 1 mg tablet Take 1 mg by mouth nightly.  omeprazole (PRILOSEC OTC) 20 mg tablet Take 20 mg by mouth nightly.       promethazine (PHENERGAN) 25 mg tablet Take 1 Tab by mouth every eight (8) hours as needed for Nausea. 90 Tab 11    citalopram (CELEXA) 10 mg tablet TAKE 1 TABLET BY MOUTH EVERY NIGHT AT BEDTIME 90 Tab 3       Review of Systems   Review of Systems   Constitutional: Negative. Negative for appetite change, chills and fever. HENT: Negative for congestion, ear pain, rhinorrhea, sinus pain, trouble swallowing and voice change. Respiratory: Negative for cough, chest tightness, shortness of breath, wheezing and stridor. Cardiovascular: Negative for chest pain, palpitations and leg swelling. Gastrointestinal: Negative for abdominal pain, blood in stool, constipation, diarrhea, nausea and vomiting. Genitourinary: Negative for difficulty urinating, dysuria, flank pain, frequency and hematuria. Musculoskeletal: Positive for back pain and myalgias. Negative for arthralgias and joint swelling. Skin: Negative. Neurological: Negative for dizziness, syncope, weakness, numbness and headaches. All other systems reviewed and are negative. Physical Exam   Physical Exam   Constitutional: She is oriented to person, place, and time. She appears well-developed and well-nourished. No distress. HENT:   Head: Atraumatic. Mouth/Throat: Oropharynx is clear and moist.   Eyes: Pupils are equal, round, and reactive to light. Conjunctivae and EOM are normal. No scleral icterus. Neck: Normal range of motion. Neck supple. No JVD present. Cardiovascular: Normal rate, regular rhythm, normal heart sounds and intact distal pulses. Pulmonary/Chest: Effort normal and breath sounds normal. She exhibits no tenderness. Abdominal: Soft. Bowel sounds are normal. She exhibits no distension. There is no tenderness. Musculoskeletal: Normal range of motion. She exhibits no edema. Neurological: She is alert and oriented to person, place, and time. No cranial nerve deficit. Skin: Skin is warm and dry. She is not diaphoretic. Nursing note and vitals reviewed.       Diagnostic Study Results Labs -  Recent Results (from the past 24 hour(s))   EKG, 12 LEAD, INITIAL    Collection Time: 10/03/19  8:12 PM   Result Value Ref Range    Ventricular Rate 96 BPM    Atrial Rate 96 BPM    P-R Interval 154 ms    QRS Duration 76 ms    Q-T Interval 334 ms    QTC Calculation (Bezet) 421 ms    Calculated P Axis 58 degrees    Calculated R Axis 45 degrees    Calculated T Axis 39 degrees    Diagnosis       Normal sinus rhythm  Normal ECG  When compared with ECG of 27-JUL-2019 14:47,  No significant change was found     CBC WITH AUTOMATED DIFF    Collection Time: 10/03/19  8:55 PM   Result Value Ref Range    WBC 12.3 (H) 3.6 - 11.0 K/uL    RBC 2.39 (L) 3.80 - 5.20 M/uL    HGB 8.0 (L) 11.5 - 16.0 g/dL    HCT 23.5 (L) 35.0 - 47.0 %    MCV 98.3 80.0 - 99.0 FL    MCH 33.5 26.0 - 34.0 PG    MCHC 34.0 30.0 - 36.5 g/dL    RDW 17.0 (H) 11.5 - 14.5 %    PLATELET 427 447 - 391 K/uL    MPV 12.0 8.9 - 12.9 FL    NRBC 2.9 (H) 0  WBC    ABSOLUTE NRBC 0.36 (H) 0.00 - 0.01 K/uL    NEUTROPHILS 65 32 - 75 %    LYMPHOCYTES 21 12 - 49 %    MONOCYTES 10 5 - 13 %    EOSINOPHILS 1 0 - 7 %    BASOPHILS 1 0 - 1 %    IMMATURE GRANULOCYTES 2 (H) 0.0 - 0.5 %    ABS. NEUTROPHILS 8.1 (H) 1.8 - 8.0 K/UL    ABS. LYMPHOCYTES 2.6 0.8 - 3.5 K/UL    ABS. MONOCYTES 1.2 (H) 0.0 - 1.0 K/UL    ABS. EOSINOPHILS 0.1 0.0 - 0.4 K/UL    ABS. BASOPHILS 0.1 0.0 - 0.1 K/UL    ABS. IMM.  GRANS. 0.2 (H) 0.00 - 0.04 K/UL    DF AUTOMATED      RBC COMMENTS ANISOCYTOSIS  2+        RBC COMMENTS SICKLE CELLS  PRESENT        RBC COMMENTS POLYCHROMASIA  PRESENT        RBC COMMENTS TARGET CELLS  PRESENT       METABOLIC PANEL, COMPREHENSIVE    Collection Time: 10/03/19  8:55 PM   Result Value Ref Range    Sodium 137 136 - 145 mmol/L    Potassium 4.5 3.5 - 5.1 mmol/L    Chloride 108 97 - 108 mmol/L    CO2 23 21 - 32 mmol/L    Anion gap 6 5 - 15 mmol/L    Glucose 90 65 - 100 mg/dL    BUN 15 6 - 20 MG/DL    Creatinine 1.02 0.55 - 1.02 MG/DL    BUN/Creatinine ratio 15 12 - 20 GFR est AA >60 >60 ml/min/1.73m2    GFR est non-AA 56 (L) >60 ml/min/1.73m2    Calcium 8.4 (L) 8.5 - 10.1 MG/DL    Bilirubin, total 1.7 (H) 0.2 - 1.0 MG/DL    ALT (SGPT) 17 12 - 78 U/L    AST (SGOT) 70 (H) 15 - 37 U/L    Alk. phosphatase 108 45 - 117 U/L    Protein, total 8.9 (H) 6.4 - 8.2 g/dL    Albumin 4.1 3.5 - 5.0 g/dL    Globulin 4.8 (H) 2.0 - 4.0 g/dL    A-G Ratio 0.9 (L) 1.1 - 2.2     RETICULOCYTE COUNT    Collection Time: 10/03/19  8:55 PM   Result Value Ref Range    Reticulocyte count 6.1 (H) 0.7 - 2.1 %    Absolute Retic Cnt. 0.1421 (H) 0.0164 - 0.0776 M/ul   TROPONIN I    Collection Time: 10/03/19  8:55 PM   Result Value Ref Range    Troponin-I, Qt. <0.05 <0.05 ng/mL       Radiologic Studies -   XR CHEST PORT   Final Result   IMPRESSION:      No acute process on portable chest. No change given difference in technique. Medical Decision Making   I am the first provider for this patient. Records Reviewed: I reviewed our electronic medical record system for any past medical records that were available that may contribute to the patient's current condition, including their PMH, surgical history, social and family history. Reviewed the nursing notes and vital signs from today's visit. Vital Signs-Reviewed the patient's vital signs. Patient Vitals for the past 24 hrs:   Temp Pulse Resp BP SpO2   10/04/19 0200    155/82 100 %   10/04/19 0130    153/66 98 %   10/04/19 0100    161/80 100 %   10/04/19 0030  98 18 150/81 100 %   10/04/19 0000     100 %   10/03/19 2330    167/75 99 %   10/03/19 2300    153/90 99 %   10/03/19 2230    139/66 100 %   10/03/19 2200    147/79 99 %   10/03/19 2002 99.4 °F (37.4 °C) (!) 101 18 156/84 98 %       Provider Notes (Medical Decision Making):     Pt presenting with acute pain typical of sickle cell crisis, will check labs, retic count, provide serial pain control and reassess.  Pt is without hypoxia or fever, do not suspect acute chest at this point. DDx: aplastic anemia, sickle cell crisis, acute chest, dehydration. ED Course:   Initial assessment performed. The patients presenting problems have been discussed, and they are in agreement with the care plan formulated and outlined with them. I have encouraged them to ask questions as they arise throughout their visit. Medications Administered During ED Course:  Medications   sodium chloride 0.9 % bolus infusion 1,000 mL (0 mL IntraVENous IV Completed 10/3/19 2246)   HYDROmorphone (PF) (DILAUDID) injection 1 mg (1 mg IntraVENous Given 10/3/19 2105)   diphenhydrAMINE (BENADRYL) injection 25 mg (25 mg IntraVENous Given 10/3/19 2105)   ondansetron (ZOFRAN) injection 4 mg (4 mg IntraVENous Given 10/3/19 2105)   HYDROmorphone (PF) (DILAUDID) injection 1 mg (1 mg IntraVENous Given 10/3/19 2328)   HYDROmorphone (PF) (DILAUDID) injection 1 mg (1 mg IntraVENous Given 10/4/19 0040)       Diagnosis     Clinical Impression:   1. Sickle cell pain crisis [facility])        Attestation:  I personally performed the services described in this documentation on this date 10/3/2019 for patient Liliana Hammonds. Benjy Dietz MD    Please note that this dictation was completed with [employer], the computer voice recognition software. Quite often unanticipated grammatical, syntax, homophones, and other interpretive errors are inadvertently transcribed by the computer software. Please disregard these errors. Please excuse any errors that have escaped final proofreading.

## 2019-10-04 NOTE — ED NOTES
TRANSFER - OUT REPORT:    Verbal report given to Lupillo Gilbert (name) on Angela Herrera  being transferred to ortho(unit) for routine progression of care       Report consisted of patients Situation, Background, Assessment and   Recommendations(SBAR). Information from the following report(s) SBAR, Kardex, ED Summary, Intake/Output and MAR was reviewed with the receiving nurse. Lines:   Peripheral IV 10/03/19 Left Hand (Active)   Site Assessment Clean, dry, & intact 10/3/2019  8:31 PM   Phlebitis Assessment 0 10/3/2019  8:31 PM   Infiltration Assessment 0 10/3/2019  8:31 PM   Dressing Status Clean, dry, & intact 10/3/2019  8:31 PM   Dressing Type 4 X 4 10/3/2019  8:31 PM       Peripheral IV 10/03/19 Right; Inner Arm (Active)   Site Assessment Clean, dry, & intact 10/3/2019  9:02 PM   Phlebitis Assessment 0 10/3/2019  9:02 PM   Infiltration Assessment 0 10/3/2019  9:02 PM   Dressing Status Clean, dry, & intact 10/3/2019  9:02 PM   Dressing Type Tape;Transparent 10/3/2019  9:02 PM   Hub Color/Line Status Blue;Flushed;Patent 10/3/2019  9:02 PM   Action Taken Blood drawn 10/3/2019  9:02 PM        Opportunity for questions and clarification was provided.       Patient transported with:   Monitor

## 2019-10-04 NOTE — PROGRESS NOTES
Dr. Carlos Marte notified per orders of pt's admission as well as pt's request for PRN IV dilaudid to be given every 3 hours instead of every 6. Orders given for PRN IV dilaudid 1 mg every 3 hours and to place pt on continuous pulse ox monitoring.

## 2019-10-04 NOTE — H&P
Hospitalist Admission Note    NAME: Dana Browne   :  1960   MRN:  [id number]     Date/Time:  10/4/2019 5:18 AM    Patient PCP: Juan Francisco Chatterjee MD  ______________________________________________________________________  Given the patient's current clinical presentation, I have a high level of concern for decompensation if discharged from the emergency department. Complex decision making was performed, which includes reviewing the patient's available past medical records, laboratory results, and x-ray films. My assessment of this patient's clinical condition and my plan of care is as follows. Assessment / Plan:  Sickle cell pain crisis  Start patient on IV fluid  Pain control  Monitor CBC    Leukocytosis likely reactive patient afebrile     Hypertension  Continue home antihypertensive medication    Depression  Continue home medication        Code Status: Full   Surrogate Decision Maker:Dorian Gipson    DVT Prophylaxis: Lovenox   GI Prophylaxis: not indicated    Baseline: independent       Subjective:   CHIEF COMPLAINT: back pain and arm pain     HISTORY OF PRESENT ILLNESS:       64years old female from home with past medical history significant for sickle cell, hypertension, depression presented to the hospital for evaluation of upper back pain associated with upper extremity pain denies any fever any chills, patient has history of sickle cell disease and she been on pain medication but despite oral pain medication at home she cannot control the pain, patient was recently admitted to the hospital for similar complaint in September, patient stated last transfusion was in May, blood work in ED show elevated white blood cell count 12.3, hemoglobin 8 chest x-ray was done on show no acute abnormality. We were asked to admit for work up and evaluation of the above problems.      Past Medical History:   Diagnosis Date    Anemia     SC hemoglobinopathy    Chronic pain     Depression     Hypertension     Liver disease     hepatitis C; pt reports \"cured\"    Sickle cell disease ([address]         Past Surgical History:   Procedure Laterality Date    BREAST SURGERY PROCEDURE UNLISTED      2 cysts removed from R breast    CHEST SURGERY PROCEDURE UNLISTED      status post thor for removal of a benign     HX BREAST BIOPSY Right 05/2007    negative    HX CHOLECYSTECTOMY      HX ORTHOPAEDIC      bony curettage of an ostial myelitis focus       Social History     Tobacco Use    Smoking status: Never Smoker    Smokeless tobacco: Never Used   Substance Use Topics    Alcohol use: No        Family History   Problem Relation Age of Onset    Hypertension Mother     Hypertension Father      Allergies   Allergen Reactions    Dilaudid [Hydromorphone (Bulk)] Itching     Can tolerate with benadryl and ondansetron    Percocet [Oxycodone-Acetaminophen] Itching        Prior to Admission medications    Medication Sig Start Date End Date Taking? Authorizing Provider   fentaNYL (DURAGESIC) 75 mcg/hr 1 Patch by TransDERmal route every seventy-two (72) hours for 30 days. Max Daily Amount: 1 Patch. 10/2/19 11/1/19  Xiomara Hoffmann MD   hydroxyurea (HYDREA) 500 mg capsule 1 tablet daily 9/20/19   Xiomara Hoffmann MD   HYDROcodone-acetaminophen [facility])  mg tablet Take 1 Tab by mouth every six (6) hours as needed for Pain for up to 30 days. Max Daily Amount: 4 Tabs. Dx.D57.1  Indications: sickl;e cell crisis 9/4/19 10/4/19  Xiomara Hoffmann MD   hydrocortisone (CORTISONE) 1 % topical cream Apply  to affected area two (2) times daily as needed for Skin Irritation. use thin layer 7/26/19   Xiomara Hoffmann MD   telmisartan (MICARDIS) 40 mg tablet Take 1 Tab by mouth nightly. 7/1/19   Xiomara Hoffmann MD   folic acid (FOLVITE) 1 mg tablet Take 1 mg by mouth nightly. Provider, Historical   omeprazole (PRILOSEC OTC) 20 mg tablet Take 20 mg by mouth nightly.     Other, MD Ean   promethazine (PHENERGAN) 25 mg tablet Take 1 Tab by mouth every eight (8) hours as needed for Nausea. 5/9/19   Tiffanie Patel MD   citalopram (CELEXA) 10 mg tablet TAKE 1 TABLET BY MOUTH EVERY NIGHT AT BEDTIME 5/9/19   Tiffanie Patel MD       REVIEW OF SYSTEMS:     I am not able to complete the review of systems because:    The patient is intubated and sedated    The patient has altered mental status due to his acute medical problems    The patient has baseline aphasia from prior stroke(s)    The patient has baseline dementia and is not reliable historian    The patient is in acute medical distress and unable to provide information           Total of 12 systems reviewed as follows:       POSITIVE= underlined text  Negative = text not underlined  General:  fever, chills, sweats, generalized weakness, weight loss/gain,      loss of appetite   Eyes:    blurred vision, eye pain, loss of vision, double vision  ENT:    rhinorrhea, pharyngitis   Respiratory:   cough, sputum production, SOB, MARS, wheezing, pleuritic pain   Cardiology:   chest pain, palpitations, orthopnea, PND, edema, syncope   Gastrointestinal:  abdominal pain , N/V, diarrhea, dysphagia, constipation, bleeding   Genitourinary:  frequency, urgency, dysuria, hematuria, incontinence   Muskuloskeletal :  arthralgia, myalgia, back pain  Hematology:  easy bruising, nose or gum bleeding, lymphadenopathy   Dermatological: rash, ulceration, pruritis, color change / jaundice  Endocrine:   hot flashes or polydipsia   Neurological:  headache, dizziness, confusion, focal weakness, paresthesia,     Speech difficulties, memory loss, gait difficulty  Psychological: Feelings of anxiety, depression, agitation    Objective:   VITALS:    Visit Vitals  /88 (BP 1 Location: Right arm, BP Patient Position: Supine)   Pulse 78   Temp 98.8 °F (37.1 °C)   Resp 19   Ht 5' 11\" (1.803 m)   Wt 93.5 kg (206 lb 2.1 oz)   SpO2 100%   BMI 28.75 kg/m²       PHYSICAL EXAM:    General:    Alert, cooperative, no distress, appears stated age. HEENT: Atraumatic, anicteric sclerae, pink conjunctivae     No oral ulcers, mucosa moist, throat clear, dentition fair  Neck:  Supple, symmetrical,  thyroid: non tender  Lungs:   Clear to auscultation bilaterally. No Wheezing or Rhonchi. No rales. Chest wall:  No tenderness  No Accessory muscle use. Heart:   Regular  rhythm,  No  murmur   No edema  Abdomen:   Soft, non-tender. Not distended. Bowel sounds normal  Extremities: No cyanosis. No clubbing,      Skin turgor normal, Capillary refill normal, Radial dial pulse 2+  Skin:     Not pale. Not Jaundiced  No rashes   Psych:  Good insight. Not depressed. Not anxious or agitated. Neurologic: EOMs intact. No facial asymmetry. No aphasia or slurred speech. Symmetrical strength, Sensation grossly intact. Alert and oriented X 4.     _______________________________________________________________________  Care Plan discussed with:    Comments   Patient y    Family      RN y    Care Manager                    Consultant:      _______________________________________________________________________  Expected  Disposition:   Home with Family y   HH/PT/OT/RN    SNF/LTC    JEREMY    ________________________________________________________________________  TOTAL TIME:  61  Minutes    Critical Care Provided     Minutes non procedure based      Comments    y Reviewed previous records   >50% of visit spent in counseling and coordination of care y Discussion with patient and/or family and questions answered       ________________________________________________________________________  Signed: Caitlin Beach MD    Procedures: see electronic medical records for all procedures/Xrays and details which were not copied into this note but were reviewed prior to creation of Plan.     LAB DATA REVIEWED:    Recent Results (from the past 24 hour(s))   EKG, 12 LEAD, INITIAL    Collection Time: 10/03/19  8:12 PM   Result Value Ref Range Ventricular Rate 96 BPM    Atrial Rate 96 BPM    P-R Interval 154 ms    QRS Duration 76 ms    Q-T Interval 334 ms    QTC Calculation (Bezet) 421 ms    Calculated P Axis 58 degrees    Calculated R Axis 45 degrees    Calculated T Axis 39 degrees    Diagnosis       Normal sinus rhythm  Normal ECG  When compared with ECG of 27-JUL-2019 14:47,  No significant change was found     CBC WITH AUTOMATED DIFF    Collection Time: 10/03/19  8:55 PM   Result Value Ref Range    WBC 12.3 (H) 3.6 - 11.0 K/uL    RBC 2.39 (L) 3.80 - 5.20 M/uL    HGB 8.0 (L) 11.5 - 16.0 g/dL    HCT 23.5 (L) 35.0 - 47.0 %    MCV 98.3 80.0 - 99.0 FL    MCH 33.5 26.0 - 34.0 PG    MCHC 34.0 30.0 - 36.5 g/dL    RDW 17.0 (H) 11.5 - 14.5 %    PLATELET 204 159 - 084 K/uL    MPV 12.0 8.9 - 12.9 FL    NRBC 2.9 (H) 0  WBC    ABSOLUTE NRBC 0.36 (H) 0.00 - 0.01 K/uL    NEUTROPHILS 65 32 - 75 %    LYMPHOCYTES 21 12 - 49 %    MONOCYTES 10 5 - 13 %    EOSINOPHILS 1 0 - 7 %    BASOPHILS 1 0 - 1 %    IMMATURE GRANULOCYTES 2 (H) 0.0 - 0.5 %    ABS. NEUTROPHILS 8.1 (H) 1.8 - 8.0 K/UL    ABS. LYMPHOCYTES 2.6 0.8 - 3.5 K/UL    ABS. MONOCYTES 1.2 (H) 0.0 - 1.0 K/UL    ABS. EOSINOPHILS 0.1 0.0 - 0.4 K/UL    ABS. BASOPHILS 0.1 0.0 - 0.1 K/UL    ABS. IMM.  GRANS. 0.2 (H) 0.00 - 0.04 K/UL    DF AUTOMATED      RBC COMMENTS ANISOCYTOSIS  2+        RBC COMMENTS SICKLE CELLS  PRESENT        RBC COMMENTS POLYCHROMASIA  PRESENT        RBC COMMENTS TARGET CELLS  PRESENT       METABOLIC PANEL, COMPREHENSIVE    Collection Time: 10/03/19  8:55 PM   Result Value Ref Range    Sodium 137 136 - 145 mmol/L    Potassium 4.5 3.5 - 5.1 mmol/L    Chloride 108 97 - 108 mmol/L    CO2 23 21 - 32 mmol/L    Anion gap 6 5 - 15 mmol/L    Glucose 90 65 - 100 mg/dL    BUN 15 6 - 20 MG/DL    Creatinine 1.02 0.55 - 1.02 MG/DL    BUN/Creatinine ratio 15 12 - 20      GFR est AA >60 >60 ml/min/1.73m2    GFR est non-AA 56 (L) >60 ml/min/1.73m2    Calcium 8.4 (L) 8.5 - 10.1 MG/DL    Bilirubin, total 1.7 (H) 0.2 - 1.0 MG/DL    ALT (SGPT) 17 12 - 78 U/L    AST (SGOT) 70 (H) 15 - 37 U/L    Alk.  phosphatase 108 45 - 117 U/L    Protein, total 8.9 (H) 6.4 - 8.2 g/dL    Albumin 4.1 3.5 - 5.0 g/dL    Globulin 4.8 (H) 2.0 - 4.0 g/dL    A-G Ratio 0.9 (L) 1.1 - 2.2     RETICULOCYTE COUNT    Collection Time: 10/03/19  8:55 PM   Result Value Ref Range    Reticulocyte count 6.1 (H) 0.7 - 2.1 %    Absolute Retic Cnt. 0.1421 (H) 0.0164 - 0.0776 M/ul   TROPONIN I    Collection Time: 10/03/19  8:55 PM   Result Value Ref Range    Troponin-I, Qt. <0.05 <0.05 ng/mL

## 2019-10-04 NOTE — PROGRESS NOTES
Pharmacy Automatic Renal Dosing Protocol - Antimicrobials    Indication for Antimicrobials: Sickle Cell Crisis With Fever      Current Regimen of Each Antimicrobial:  Vancomycin 2000 mg x 1 then 1000 mg Q12H (Start Date 10/4; Day # 1)  Ceftriaxone 1 gram Q24H (10/4, Day 1)    Previous Antimicrobial Therapy:    Vancomycin Goal Level: 10-15 mcg/ml    Vancomycin Levels  Date Dose & Interval Measured (mcg/mL) Steady State (mcg/mL)                       Date & time of next level:     Significant Cultures:     Radiology / Imaging results: (X-ray, CT scan or MRI):       Labs:  Recent Labs     10/03/19  2055   CREA 1.02   BUN 15   WBC 12.3*     Temp (24hrs), Av.6 °F (37.6 °C), Min:98.7 °F (37.1 °C), Max:102.4 °F (39.1 °C)      Paralysis, amputations, malnutrition:   Creatinine Clearance (mL/min) or Dialysis: 68    Impression/Plan:   Antibiotics as above     Pharmacy will follow daily and adjust medications as appropriate for renal function and/or serum levels. Thank you,  Nenita Valentine, [facility]      Recommended duration of therapy  http://[facility]/[place]/[facility]/Pharmacy/Clinical%20Companion/Duration%20of%20ABX%20therapy. docx    Renal Dosing  http://[facility]/[place]/[facility]/Pharmacy/Clinical%20Companion/Renal%[id number]. pdf

## 2019-10-04 NOTE — PROGRESS NOTES
Dr. Vada Dance notified of pt's current vital signs of temperature of 102.4 orally, pulse of 100, pulse ox 99% on 2 liters of o2, and bp of 148/81, and respirations of 18 with a mews score of 3 as well as sepsis protocol pop-up. Orders given for paired blood cultures, STAT chest xray, urinalysis, urine C&S, Rocephin 1g IV q 24 hours, vancomycin pharmacy dose, tylenol 650 mg q 4 hours prn.

## 2019-10-04 NOTE — PROGRESS NOTES
Notified by  to please call MD for order for lactic acid due to sepsis bundle being initiated. Dr. Vada Dance notified per their request. Call back number [phone] given.  Order given for lactic acid

## 2019-10-05 LAB
ALBUMIN SERPL-MCNC: 3.5 G/DL (ref 3.5–5)
ALBUMIN/GLOB SERPL: 0.8 {RATIO} (ref 1.1–2.2)
ALP SERPL-CCNC: 165 U/L (ref 45–117)
ALT SERPL-CCNC: 22 U/L (ref 12–78)
ANION GAP SERPL CALC-SCNC: 4 MMOL/L (ref 5–15)
AST SERPL-CCNC: 79 U/L (ref 15–37)
BASOPHILS # BLD: 0 K/UL (ref 0–0.1)
BASOPHILS NFR BLD: 0 % (ref 0–1)
BILIRUB SERPL-MCNC: 3.4 MG/DL (ref 0.2–1)
BLASTS NFR BLD MANUAL: 0 %
BUN SERPL-MCNC: 6 MG/DL (ref 6–20)
BUN/CREAT SERPL: 8 (ref 12–20)
CALCIUM SERPL-MCNC: 8.4 MG/DL (ref 8.5–10.1)
CHLORIDE SERPL-SCNC: 108 MMOL/L (ref 97–108)
CO2 SERPL-SCNC: 27 MMOL/L (ref 21–32)
CREAT SERPL-MCNC: 0.76 MG/DL (ref 0.55–1.02)
DIFFERENTIAL METHOD BLD: ABNORMAL
EOSINOPHIL # BLD: 0.1 K/UL (ref 0–0.4)
EOSINOPHIL NFR BLD: 1 % (ref 0–7)
ERYTHROCYTE [DISTWIDTH] IN BLOOD BY AUTOMATED COUNT: 16 % (ref 11.5–14.5)
GLOBULIN SER CALC-MCNC: 4.6 G/DL (ref 2–4)
GLUCOSE SERPL-MCNC: 127 MG/DL (ref 65–100)
HCT VFR BLD AUTO: 21.1 % (ref 35–47)
HGB BLD-MCNC: 7.5 G/DL (ref 11.5–16)
IMM GRANULOCYTES # BLD AUTO: 0 K/UL
IMM GRANULOCYTES NFR BLD AUTO: 0 %
LYMPHOCYTES # BLD: 3.4 K/UL (ref 0.8–3.5)
LYMPHOCYTES NFR BLD: 26 % (ref 12–49)
MCH RBC QN AUTO: 34.7 PG (ref 26–34)
MCHC RBC AUTO-ENTMCNC: 35.5 G/DL (ref 30–36.5)
MCV RBC AUTO: 97.7 FL (ref 80–99)
METAMYELOCYTES NFR BLD MANUAL: 0 %
MONOCYTES # BLD: 1.1 K/UL (ref 0–1)
MONOCYTES NFR BLD: 8 % (ref 5–13)
MYELOCYTES NFR BLD MANUAL: 0 %
NEUTS BAND NFR BLD MANUAL: 0 % (ref 0–6)
NEUTS SEG # BLD: 8.6 K/UL (ref 1.8–8)
NEUTS SEG NFR BLD: 65 % (ref 32–75)
NRBC # BLD: 0.77 K/UL (ref 0–0.01)
NRBC BLD-RTO: 5.8 PER 100 WBC
OTHER CELLS NFR BLD MANUAL: 0 %
PLATELET # BLD AUTO: 149 K/UL (ref 150–400)
PMV BLD AUTO: 11.8 FL (ref 8.9–12.9)
POTASSIUM SERPL-SCNC: 3.8 MMOL/L (ref 3.5–5.1)
PROMYELOCYTES NFR BLD MANUAL: 0 %
PROT SERPL-MCNC: 8.1 G/DL (ref 6.4–8.2)
RBC # BLD AUTO: 2.16 M/UL (ref 3.8–5.2)
RBC MORPH BLD: ABNORMAL
SODIUM SERPL-SCNC: 139 MMOL/L (ref 136–145)
WBC # BLD AUTO: 13.2 K/UL (ref 3.6–11)

## 2019-10-05 PROCEDURE — 65660000000 HC RM CCU STEPDOWN

## 2019-10-05 PROCEDURE — 74011250636 HC RX REV CODE- 250/636: Performed by: INTERNAL MEDICINE

## 2019-10-05 PROCEDURE — 36415 COLL VENOUS BLD VENIPUNCTURE: CPT

## 2019-10-05 PROCEDURE — 85027 COMPLETE CBC AUTOMATED: CPT

## 2019-10-05 PROCEDURE — 74011000258 HC RX REV CODE- 258: Performed by: INTERNAL MEDICINE

## 2019-10-05 PROCEDURE — 74011250637 HC RX REV CODE- 250/637: Performed by: INTERNAL MEDICINE

## 2019-10-05 PROCEDURE — 80053 COMPREHEN METABOLIC PANEL: CPT

## 2019-10-05 RX ORDER — FENTANYL 75 UG/H
1 PATCH TRANSDERMAL
Status: DISCONTINUED | OUTPATIENT
Start: 2019-10-05 | End: 2019-10-17 | Stop reason: HOSPADM

## 2019-10-05 RX ORDER — DEXTROSE, SODIUM CHLORIDE, AND POTASSIUM CHLORIDE 5; .45; .22 G/100ML; G/100ML; G/100ML
INJECTION INTRAVENOUS CONTINUOUS
Status: DISCONTINUED | OUTPATIENT
Start: 2019-10-05 | End: 2019-10-15 | Stop reason: SDUPTHER

## 2019-10-05 RX ADMIN — FOLIC ACID 1 MG: 1 TABLET ORAL at 22:48

## 2019-10-05 RX ADMIN — Medication 10 ML: at 16:36

## 2019-10-05 RX ADMIN — DIPHENHYDRAMINE HYDROCHLORIDE 25 MG: 50 INJECTION, SOLUTION INTRAMUSCULAR; INTRAVENOUS at 22:50

## 2019-10-05 RX ADMIN — VANCOMYCIN HYDROCHLORIDE 1000 MG: 1 INJECTION, POWDER, LYOPHILIZED, FOR SOLUTION INTRAVENOUS at 08:04

## 2019-10-05 RX ADMIN — HYDROMORPHONE HYDROCHLORIDE 1 MG: 1 INJECTION, SOLUTION INTRAMUSCULAR; INTRAVENOUS; SUBCUTANEOUS at 11:04

## 2019-10-05 RX ADMIN — LOSARTAN POTASSIUM 50 MG: 50 TABLET, FILM COATED ORAL at 22:48

## 2019-10-05 RX ADMIN — ACETAMINOPHEN 650 MG: 325 TABLET ORAL at 16:36

## 2019-10-05 RX ADMIN — DIPHENHYDRAMINE HYDROCHLORIDE 25 MG: 50 INJECTION, SOLUTION INTRAMUSCULAR; INTRAVENOUS at 05:04

## 2019-10-05 RX ADMIN — DIPHENHYDRAMINE HYDROCHLORIDE 25 MG: 50 INJECTION, SOLUTION INTRAMUSCULAR; INTRAVENOUS at 11:05

## 2019-10-05 RX ADMIN — ONDANSETRON HYDROCHLORIDE 4 MG: 2 INJECTION, SOLUTION INTRAMUSCULAR; INTRAVENOUS at 22:50

## 2019-10-05 RX ADMIN — ONDANSETRON HYDROCHLORIDE 4 MG: 2 INJECTION, SOLUTION INTRAMUSCULAR; INTRAVENOUS at 16:59

## 2019-10-05 RX ADMIN — VANCOMYCIN HYDROCHLORIDE 1000 MG: 1 INJECTION, POWDER, LYOPHILIZED, FOR SOLUTION INTRAVENOUS at 16:36

## 2019-10-05 RX ADMIN — HYDROMORPHONE HYDROCHLORIDE 1 MG: 1 INJECTION, SOLUTION INTRAMUSCULAR; INTRAVENOUS; SUBCUTANEOUS at 20:13

## 2019-10-05 RX ADMIN — HYDROXYUREA 500 MG: 500 CAPSULE ORAL at 09:05

## 2019-10-05 RX ADMIN — DEXTROSE MONOHYDRATE, SODIUM CHLORIDE, AND POTASSIUM CHLORIDE: 50; 4.5; 2.24 INJECTION, SOLUTION INTRAVENOUS at 09:07

## 2019-10-05 RX ADMIN — HYDROMORPHONE HYDROCHLORIDE 1 MG: 1 INJECTION, SOLUTION INTRAMUSCULAR; INTRAVENOUS; SUBCUTANEOUS at 08:04

## 2019-10-05 RX ADMIN — ONDANSETRON HYDROCHLORIDE 4 MG: 2 INJECTION, SOLUTION INTRAMUSCULAR; INTRAVENOUS at 02:00

## 2019-10-05 RX ADMIN — DEXTROSE MONOHYDRATE, SODIUM CHLORIDE, AND POTASSIUM CHLORIDE: 50; 4.5; 2.24 INJECTION, SOLUTION INTRAVENOUS at 22:16

## 2019-10-05 RX ADMIN — HYDROMORPHONE HYDROCHLORIDE 1 MG: 1 INJECTION, SOLUTION INTRAMUSCULAR; INTRAVENOUS; SUBCUTANEOUS at 13:59

## 2019-10-05 RX ADMIN — HYDROMORPHONE HYDROCHLORIDE 1 MG: 1 INJECTION, SOLUTION INTRAMUSCULAR; INTRAVENOUS; SUBCUTANEOUS at 05:04

## 2019-10-05 RX ADMIN — ONDANSETRON HYDROCHLORIDE 4 MG: 2 INJECTION, SOLUTION INTRAMUSCULAR; INTRAVENOUS at 08:04

## 2019-10-05 RX ADMIN — ENOXAPARIN SODIUM 40 MG: 40 INJECTION, SOLUTION INTRAVENOUS; SUBCUTANEOUS at 05:04

## 2019-10-05 RX ADMIN — CEFTRIAXONE 1 G: 1 INJECTION, POWDER, FOR SOLUTION INTRAMUSCULAR; INTRAVENOUS at 17:00

## 2019-10-05 RX ADMIN — DIPHENHYDRAMINE HYDROCHLORIDE 25 MG: 50 INJECTION, SOLUTION INTRAMUSCULAR; INTRAVENOUS at 17:00

## 2019-10-05 RX ADMIN — Medication 10 ML: at 22:51

## 2019-10-05 RX ADMIN — HYDROMORPHONE HYDROCHLORIDE 1 MG: 1 INJECTION, SOLUTION INTRAMUSCULAR; INTRAVENOUS; SUBCUTANEOUS at 22:54

## 2019-10-05 RX ADMIN — CITALOPRAM HYDROBROMIDE 10 MG: 20 TABLET, FILM COATED ORAL at 08:04

## 2019-10-05 RX ADMIN — PANTOPRAZOLE SODIUM 40 MG: 40 TABLET, DELAYED RELEASE ORAL at 22:48

## 2019-10-05 RX ADMIN — HYDROMORPHONE HYDROCHLORIDE 1 MG: 1 INJECTION, SOLUTION INTRAMUSCULAR; INTRAVENOUS; SUBCUTANEOUS at 16:59

## 2019-10-05 RX ADMIN — Medication 10 ML: at 05:05

## 2019-10-05 RX ADMIN — HYDROMORPHONE HYDROCHLORIDE 1 MG: 1 INJECTION, SOLUTION INTRAMUSCULAR; INTRAVENOUS; SUBCUTANEOUS at 02:00

## 2019-10-05 NOTE — PROGRESS NOTES
Bedside and Verbal shift change report given to Stephanie Lynne (oncoming nurse) by Seb Cortez (offgoing nurse). Report included the following information SBAR, Kardex, Intake/Output, MAR, Recent Results and Cardiac Rhythm NSR/TACH.

## 2019-10-05 NOTE — PROGRESS NOTES
Bedside and Verbal shift change report given to Briana Michele (oncoming nurse) by Ramón Babb (offgoing nurse). Report included the following information SBAR, Kardex, Intake/Output, MAR, Accordion, Recent Results and Cardiac Rhythm nsr/sinus tach.

## 2019-10-05 NOTE — PROGRESS NOTES
Problem: Falls - Risk of  Goal: *Absence of Falls  Description  Document Milad Gilmore Fall Risk and appropriate interventions in the flowsheet. Outcome: Progressing Towards Goal  Note:   Fall Risk Interventions:       Mentation Interventions: Adequate sleep, hydration, pain control, Door open when patient unattended, Evaluate medications/consider consulting pharmacy, Familiar objects from home, Eyeglasses and hearing aids, Update white board, Toileting rounds    Medication Interventions: Assess postural VS orthostatic hypotension, Evaluate medications/consider consulting pharmacy, Patient to call before getting OOB, Teach patient to arise slowly, Utilize gait belt for transfers/ambulation                   Problem: Patient Education: Go to Patient Education Activity  Goal: Patient/Family Education  Outcome: Progressing Towards Goal     Problem: Pain  Goal: *Control of Pain  Outcome: Progressing Towards Goal  Goal: *PALLIATIVE CARE:  Alleviation of Pain  Outcome: Progressing Towards Goal     Problem: Patient Education: Go to Patient Education Activity  Goal: Patient/Family Education  Outcome: Progressing Towards Goal     Problem: Falls - Risk of  Goal: *Absence of Falls  Description  Document Milad Gilmore Fall Risk and appropriate interventions in the flowsheet.   Outcome: Progressing Towards Goal  Note:   Fall Risk Interventions:       Mentation Interventions: Adequate sleep, hydration, pain control, Door open when patient unattended, Evaluate medications/consider consulting pharmacy, Familiar objects from home, Eyeglasses and hearing aids, Update white board, Toileting rounds    Medication Interventions: Assess postural VS orthostatic hypotension, Evaluate medications/consider consulting pharmacy, Patient to call before getting OOB, Teach patient to arise slowly, Utilize gait belt for transfers/ambulation                   Problem: Patient Education: Go to Patient Education Activity  Goal: Patient/Family Education  Outcome: Progressing Towards Goal     Problem: Pain  Goal: *Control of Pain  Outcome: Progressing Towards Goal     Problem: Pain  Goal: *PALLIATIVE CARE:  Alleviation of Pain  Outcome: Progressing Towards Goal     Problem: Patient Education: Go to Patient Education Activity  Goal: Patient/Family Education  Outcome: Progressing Towards Goal

## 2019-10-05 NOTE — PROGRESS NOTES
General Daily Progress Note    Admit Date: 10/3/2019    Subjective:     Patient c/o pain. Current Facility-Administered Medications   Medication Dose Route Frequency    fentaNYL (DURAGESIC) 75 mcg/hr patch 1 Patch  1 Patch TransDERmal Q72H    dextrose 5% - 0.45% NaCl with KCl 30 mEq/L infusion   IntraVENous CONTINUOUS    sodium chloride (NS) flush 5-40 mL  5-40 mL IntraVENous Q8H    sodium chloride (NS) flush 5-40 mL  5-40 mL IntraVENous PRN    naloxone (NARCAN) injection 0.4 mg  0.4 mg IntraVENous PRN    ondansetron (ZOFRAN) injection 4 mg  4 mg IntraVENous Q4H PRN    bisacodyl (DULCOLAX) tablet 5 mg  5 mg Oral DAILY PRN    enoxaparin (LOVENOX) injection 40 mg  40 mg SubCUTAneous Q24H    diphenhydrAMINE (BENADRYL) injection 25 mg  25 mg IntraVENous Q6H PRN    hydroxyurea (HYDREA) chemo cap 500 mg  500 mg Oral DAILY    folic acid (FOLVITE) tablet 1 mg  1 mg Oral QHS    pantoprazole (PROTONIX) tablet 40 mg  40 mg Oral QHS    citalopram (CELEXA) tablet 10 mg  10 mg Oral DAILY    losartan (COZAAR) tablet 50 mg  50 mg Oral QHS    HYDROmorphone (PF) (DILAUDID) injection 1 mg  1 mg IntraVENous Q3H PRN    cefTRIAXone (ROCEPHIN) 1 g in 0.9% sodium chloride (MBP/ADV) 50 mL  1 g IntraVENous Q24H    acetaminophen (TYLENOL) tablet 650 mg  650 mg Oral Q4H PRN    vancomycin (VANCOCIN) 1,000 mg in 0.9% sodium chloride (MBP/ADV) 250 mL  1,000 mg IntraVENous Q12H        Review of Systems  A comprehensive review of systems was negative.     Objective:     Patient Vitals for the past 24 hrs:   BP Temp Pulse Resp SpO2   10/05/19 0728 169/83 99.7 °F (37.6 °C) 93 18 100 %   10/05/19 0400   98     10/05/19 0327 173/78 99.2 °F (37.3 °C) (!) 102 18 100 %   10/05/19 0000   98     10/04/19 2305 (!) 158/97 (!) 101.4 °F (38.6 °C) 97 18 98 %   10/04/19 2052 157/83 (!) 102.7 °F (39.3 °C) (!) 102 18 96 %   10/04/19 2000   (!) 101     10/04/19 1644 148/81 (!) 102.4 °F (39.1 °C) 100 18 99 %   10/04/19 1154 146/82 99.7 °F (37.6 °C) 88 18 99 %     10/05 0701 - 10/05 1900  In: 5011 [I.V.:1325]  Out: -   10/03 1901 - 10/05 0700  In: 2048.3 [I.V.:2048.3]  Out: -     Physical Exam:   Visit Vitals  /83   Pulse 93   Temp 99.7 °F (37.6 °C)   Resp 18   Ht 5' 11\" (1.803 m)   Wt 206 lb 2.1 oz (93.5 kg)   SpO2 100%   BMI 28.75 kg/m²     General appearance: alert, cooperative, no distress, appears stated age  Neck: supple, symmetrical, trachea midline, no adenopathy, thyroid: not enlarged, symmetric, no tenderness/mass/nodules, no carotid bruit and no JVD  Lungs: clear to auscultation bilaterally  Heart: regular rate and rhythm, S1, S2 normal, no murmur, click, rub or gallop  Abdomen: soft, non-tender.  Bowel sounds normal. No masses,  no organomegaly  Extremities: extremities normal, atraumatic, no cyanosis or edema        Data Review   Recent Results (from the past 24 hour(s))   CULTURE, BLOOD, PAIRED    Collection Time: 10/04/19  5:31 PM   Result Value Ref Range    Special Requests: NO SPECIAL REQUESTS      Culture result: NO GROWTH AFTER 14 HOURS     LACTIC ACID    Collection Time: 10/04/19  6:35 PM   Result Value Ref Range    Lactic acid 0.8 0.4 - 2.0 MMOL/L   URINALYSIS W/MICROSCOPIC    Collection Time: 10/04/19  7:29 PM   Result Value Ref Range    Color YELLOW/STRAW      Appearance CLEAR CLEAR      Specific gravity 1.008 1.003 - 1.030      pH (UA) 6.0 5.0 - 8.0      Protein NEGATIVE  NEG mg/dL    Glucose NEGATIVE  NEG mg/dL    Ketone NEGATIVE  NEG mg/dL    Bilirubin NEGATIVE  NEG      Blood NEGATIVE  NEG      Urobilinogen 4.0 (H) 0.2 - 1.0 EU/dL    Nitrites NEGATIVE  NEG      Leukocyte Esterase NEGATIVE  NEG      WBC 0-4 0 - 4 /hpf    RBC 0-5 0 - 5 /hpf    Epithelial cells FEW FEW /lpf    Bacteria NEGATIVE  NEG /hpf   CBC WITH MANUAL DIFF    Collection Time: 10/05/19  4:09 AM   Result Value Ref Range    WBC 13.2 (H) 3.6 - 11.0 K/uL    RBC 2.16 (L) 3.80 - 5.20 M/uL    HGB 7.5 (L) 11.5 - 16.0 g/dL    HCT 21.1 (L) 35.0 - 47.0 %    MCV 97.7 80.0 - 99.0 FL    MCH 34.7 (H) 26.0 - 34.0 PG    MCHC 35.5 30.0 - 36.5 g/dL    RDW 16.0 (H) 11.5 - 14.5 %    PLATELET 679 (L) 211 - 400 K/uL    MPV 11.8 8.9 - 12.9 FL    NRBC 5.8 (H) 0  WBC    ABSOLUTE NRBC 0.77 (H) 0.00 - 0.01 K/uL    NEUTROPHILS 65 32 - 75 %    BAND NEUTROPHILS 0 0 - 6 %    LYMPHOCYTES 26 12 - 49 %    MONOCYTES 8 5 - 13 %    EOSINOPHILS 1 0 - 7 %    BASOPHILS 0 0 - 1 %    METAMYELOCYTES 0 0 %    MYELOCYTES 0 0 %    PROMYELOCYTES 0 0 %    BLASTS 0 0 %    OTHER CELL 0 0      IMMATURE GRANULOCYTES 0 %    ABS. NEUTROPHILS 8.6 (H) 1.8 - 8.0 K/UL    ABS. LYMPHOCYTES 3.4 0.8 - 3.5 K/UL    ABS. MONOCYTES 1.1 (H) 0.0 - 1.0 K/UL    ABS. EOSINOPHILS 0.1 0.0 - 0.4 K/UL    ABS. BASOPHILS 0.0 0.0 - 0.1 K/UL    ABS. IMM. GRANS. 0.0 K/UL    DF MANUAL      RBC COMMENTS HYPOCHROMIA  1+        RBC COMMENTS TARGET CELLS  PRESENT        RBC COMMENTS POIKILOCYTOSIS  PRESENT       METABOLIC PANEL, COMPREHENSIVE    Collection Time: 10/05/19  4:09 AM   Result Value Ref Range    Sodium 139 136 - 145 mmol/L    Potassium 3.8 3.5 - 5.1 mmol/L    Chloride 108 97 - 108 mmol/L    CO2 27 21 - 32 mmol/L    Anion gap 4 (L) 5 - 15 mmol/L    Glucose 127 (H) 65 - 100 mg/dL    BUN 6 6 - 20 MG/DL    Creatinine 0.76 0.55 - 1.02 MG/DL    BUN/Creatinine ratio 8 (L) 12 - 20      GFR est AA >60 >60 ml/min/1.73m2    GFR est non-AA >60 >60 ml/min/1.73m2    Calcium 8.4 (L) 8.5 - 10.1 MG/DL    Bilirubin, total 3.4 (H) 0.2 - 1.0 MG/DL    ALT (SGPT) 22 12 - 78 U/L    AST (SGOT) 79 (H) 15 - 37 U/L    Alk. phosphatase 165 (H) 45 - 117 U/L    Protein, total 8.1 6.4 - 8.2 g/dL    Albumin 3.5 3.5 - 5.0 g/dL    Globulin 4.6 (H) 2.0 - 4.0 g/dL    A-G Ratio 0.8 (L) 1.1 - 2.2             Assessment:     Active Problems:    Sickle cell pain crisis ([facility] [address] (9/8/2018)        Plan: 1. Painful crisis conts and appears severe based on dori increase. If dori cont sto raise and Hb drop will transfuse tomorrow. Pain control augmented with Duragesic. 2.Elevated temp most likely secondary to crisis rather than infection. Culture neg thus far.

## 2019-10-05 NOTE — PROGRESS NOTES
Dr. Lemos Hallmark notified for temperature of 101.2 orally and mews score of 4. Call back number [phone] given. No new orders given at this time.

## 2019-10-05 NOTE — ROUTINE PROCESS
Bedside and Verbal shift change report given to [employer] (oncoming nurse) by Tushar Graves (offgoing nurse). Report included the following information SBAR, Kardex, Intake/Output, MAR, Recent Results and Cardiac Rhythm NSR/TACH.

## 2019-10-05 NOTE — PROGRESS NOTES
Pharmacy Automatic Renal Dosing Protocol - Antimicrobials    Indication for Antimicrobials: Sickle Cell Crisis With Fever    Current Regimen of Each Antimicrobial:  Vancomycin 1000 mg IV every 12 hours (Start Date 10/4; Day # 2)  Ceftriaxone 1 g IV every 24 hours (Start Date 10/4; Day # 2)    Previous Antimicrobial Therapy:  None    Vancomycin Goal Level: 15-20 mcg/mL  Vancomycin Levels  Date Dose & Interval Measured (mcg/mL) Steady State (mcg/mL)                       Date & time of next level: 10/6 @ 0900    Significant Cultures:   10/4 Blood: NGTD x 14 hours- pending    Paralysis, amputations, malnutrition: None documented    Labs:  Recent Labs     10/05/19  0409 10/03/19  2055   CREA 0.76 1.02   BUN 6 15   WBC 13.2* 12.3*     Temp (24hrs), Av.8 °F (38.2 °C), Min:99.2 °F (37.3 °C), Max:102.7 °F (39.3 °C)    Creatinine Clearance (mL/min) or Dialysis: 91 mL/min    Impression/Plan:   Renal function has improved and the patient is febrile, will adjust vancomycin to 1000 mg IV every 8 hours for an estimated trough of 18.6 mcg/mL  Will continue current regimen for ceftriaxone as appropriate for indication   Will order a vancomycin trough to be drawn prior to 0900 dose tomorrow. Stop date pending     Pharmacy will follow daily and adjust medications as appropriate for renal function and/or serum levels. Thank you,  Nicolle Borges, [facility]      Recommended duration of therapy  http://[facility]/Pharmacy/Clinical%20Companion/Duration%20of%20ABX%20therapy. docx    Renal Dosing  http://[facility]/[place]/[facility]/Pharmacy/Clinical%20Companion/Renal%[id number]. pdf

## 2019-10-06 LAB
ALBUMIN SERPL-MCNC: 3.3 G/DL (ref 3.5–5)
ALBUMIN/GLOB SERPL: 0.7 {RATIO} (ref 1.1–2.2)
ALP SERPL-CCNC: 148 U/L (ref 45–117)
ALT SERPL-CCNC: 18 U/L (ref 12–78)
ANION GAP SERPL CALC-SCNC: 3 MMOL/L (ref 5–15)
AST SERPL-CCNC: 57 U/L (ref 15–37)
BASOPHILS # BLD: 0 K/UL (ref 0–0.1)
BASOPHILS NFR BLD: 0 % (ref 0–1)
BILIRUB SERPL-MCNC: 2.2 MG/DL (ref 0.2–1)
BLASTS NFR BLD MANUAL: 0 %
BUN SERPL-MCNC: 4 MG/DL (ref 6–20)
BUN/CREAT SERPL: 6 (ref 12–20)
CALCIUM SERPL-MCNC: 8.4 MG/DL (ref 8.5–10.1)
CHLORIDE SERPL-SCNC: 105 MMOL/L (ref 97–108)
CO2 SERPL-SCNC: 28 MMOL/L (ref 21–32)
CREAT SERPL-MCNC: 0.68 MG/DL (ref 0.55–1.02)
DATE LAST DOSE: ABNORMAL
DIFFERENTIAL METHOD BLD: ABNORMAL
EOSINOPHIL # BLD: 0.1 K/UL (ref 0–0.4)
EOSINOPHIL NFR BLD: 1 % (ref 0–7)
ERYTHROCYTE [DISTWIDTH] IN BLOOD BY AUTOMATED COUNT: 15.7 % (ref 11.5–14.5)
GLOBULIN SER CALC-MCNC: 4.5 G/DL (ref 2–4)
GLUCOSE BLD STRIP.AUTO-MCNC: 138 MG/DL (ref 65–100)
GLUCOSE SERPL-MCNC: 124 MG/DL (ref 65–100)
HCT VFR BLD AUTO: 20.9 % (ref 35–47)
HGB BLD-MCNC: 7.4 G/DL (ref 11.5–16)
IMM GRANULOCYTES # BLD AUTO: 0 K/UL
IMM GRANULOCYTES NFR BLD AUTO: 0 %
LYMPHOCYTES # BLD: 2.4 K/UL (ref 0.8–3.5)
LYMPHOCYTES NFR BLD: 21 % (ref 12–49)
MCH RBC QN AUTO: 34.4 PG (ref 26–34)
MCHC RBC AUTO-ENTMCNC: 35.4 G/DL (ref 30–36.5)
MCV RBC AUTO: 97.2 FL (ref 80–99)
METAMYELOCYTES NFR BLD MANUAL: 0 %
MONOCYTES # BLD: 0.2 K/UL (ref 0–1)
MONOCYTES NFR BLD: 2 % (ref 5–13)
MYELOCYTES NFR BLD MANUAL: 0 %
NEUTS BAND NFR BLD MANUAL: 1 % (ref 0–6)
NEUTS SEG # BLD: 8.8 K/UL (ref 1.8–8)
NEUTS SEG NFR BLD: 75 % (ref 32–75)
NRBC # BLD: 0.32 K/UL (ref 0–0.01)
NRBC BLD-RTO: 2.8 PER 100 WBC
OTHER CELLS NFR BLD MANUAL: 0 %
PLATELET # BLD AUTO: 120 K/UL (ref 150–400)
PMV BLD AUTO: 11.9 FL (ref 8.9–12.9)
POTASSIUM SERPL-SCNC: 4.1 MMOL/L (ref 3.5–5.1)
PROMYELOCYTES NFR BLD MANUAL: 0 %
PROT SERPL-MCNC: 7.8 G/DL (ref 6.4–8.2)
RBC # BLD AUTO: 2.15 M/UL (ref 3.8–5.2)
RBC MORPH BLD: ABNORMAL
REPORTED DOSE,DOSE: ABNORMAL UNITS
REPORTED DOSE/TIME,TMG: ABNORMAL
SERVICE CMNT-IMP: ABNORMAL
SODIUM SERPL-SCNC: 136 MMOL/L (ref 136–145)
VANCOMYCIN TROUGH SERPL-MCNC: 12.1 UG/ML (ref 5–10)
WBC # BLD AUTO: 11.5 K/UL (ref 3.6–11)

## 2019-10-06 PROCEDURE — 74011250636 HC RX REV CODE- 250/636: Performed by: INTERNAL MEDICINE

## 2019-10-06 PROCEDURE — 82962 GLUCOSE BLOOD TEST: CPT

## 2019-10-06 PROCEDURE — 74011000258 HC RX REV CODE- 258: Performed by: INTERNAL MEDICINE

## 2019-10-06 PROCEDURE — 80202 ASSAY OF VANCOMYCIN: CPT

## 2019-10-06 PROCEDURE — 74011250637 HC RX REV CODE- 250/637: Performed by: INTERNAL MEDICINE

## 2019-10-06 PROCEDURE — 65660000000 HC RM CCU STEPDOWN

## 2019-10-06 PROCEDURE — 36415 COLL VENOUS BLD VENIPUNCTURE: CPT

## 2019-10-06 PROCEDURE — 85027 COMPLETE CBC AUTOMATED: CPT

## 2019-10-06 PROCEDURE — 83021 HEMOGLOBIN CHROMOTOGRAPHY: CPT

## 2019-10-06 PROCEDURE — 80053 COMPREHEN METABOLIC PANEL: CPT

## 2019-10-06 RX ORDER — VANCOMYCIN HYDROCHLORIDE
1250 EVERY 8 HOURS
Status: DISCONTINUED | OUTPATIENT
Start: 2019-10-06 | End: 2019-10-08

## 2019-10-06 RX ADMIN — ACETAMINOPHEN 650 MG: 325 TABLET ORAL at 07:53

## 2019-10-06 RX ADMIN — ACETAMINOPHEN 650 MG: 325 TABLET ORAL at 18:56

## 2019-10-06 RX ADMIN — VANCOMYCIN HYDROCHLORIDE 1250 MG: 10 INJECTION, POWDER, LYOPHILIZED, FOR SOLUTION INTRAVENOUS at 17:00

## 2019-10-06 RX ADMIN — ONDANSETRON HYDROCHLORIDE 4 MG: 2 INJECTION, SOLUTION INTRAMUSCULAR; INTRAVENOUS at 13:36

## 2019-10-06 RX ADMIN — HYDROMORPHONE HYDROCHLORIDE 1 MG: 1 INJECTION, SOLUTION INTRAMUSCULAR; INTRAVENOUS; SUBCUTANEOUS at 01:28

## 2019-10-06 RX ADMIN — DIPHENHYDRAMINE HYDROCHLORIDE 25 MG: 50 INJECTION, SOLUTION INTRAMUSCULAR; INTRAVENOUS at 07:43

## 2019-10-06 RX ADMIN — CITALOPRAM HYDROBROMIDE 10 MG: 20 TABLET, FILM COATED ORAL at 09:20

## 2019-10-06 RX ADMIN — FOLIC ACID 1 MG: 1 TABLET ORAL at 21:56

## 2019-10-06 RX ADMIN — Medication 10 ML: at 13:36

## 2019-10-06 RX ADMIN — DEXTROSE MONOHYDRATE, SODIUM CHLORIDE, AND POTASSIUM CHLORIDE: 50; 4.5; 2.24 INJECTION, SOLUTION INTRAVENOUS at 17:04

## 2019-10-06 RX ADMIN — HYDROMORPHONE HYDROCHLORIDE 1 MG: 1 INJECTION, SOLUTION INTRAMUSCULAR; INTRAVENOUS; SUBCUTANEOUS at 19:19

## 2019-10-06 RX ADMIN — ONDANSETRON HYDROCHLORIDE 4 MG: 2 INJECTION, SOLUTION INTRAMUSCULAR; INTRAVENOUS at 19:19

## 2019-10-06 RX ADMIN — HYDROMORPHONE HYDROCHLORIDE 1 MG: 1 INJECTION, SOLUTION INTRAMUSCULAR; INTRAVENOUS; SUBCUTANEOUS at 21:56

## 2019-10-06 RX ADMIN — HYDROXYUREA 500 MG: 500 CAPSULE ORAL at 09:21

## 2019-10-06 RX ADMIN — HYDROMORPHONE HYDROCHLORIDE 1 MG: 1 INJECTION, SOLUTION INTRAMUSCULAR; INTRAVENOUS; SUBCUTANEOUS at 13:36

## 2019-10-06 RX ADMIN — ENOXAPARIN SODIUM 40 MG: 40 INJECTION, SOLUTION INTRAVENOUS; SUBCUTANEOUS at 05:17

## 2019-10-06 RX ADMIN — VANCOMYCIN HYDROCHLORIDE 1000 MG: 1 INJECTION, POWDER, LYOPHILIZED, FOR SOLUTION INTRAVENOUS at 00:30

## 2019-10-06 RX ADMIN — HYDROMORPHONE HYDROCHLORIDE 1 MG: 1 INJECTION, SOLUTION INTRAMUSCULAR; INTRAVENOUS; SUBCUTANEOUS at 10:36

## 2019-10-06 RX ADMIN — DIPHENHYDRAMINE HYDROCHLORIDE 25 MG: 50 INJECTION, SOLUTION INTRAMUSCULAR; INTRAVENOUS at 19:19

## 2019-10-06 RX ADMIN — HYDROMORPHONE HYDROCHLORIDE 1 MG: 1 INJECTION, SOLUTION INTRAMUSCULAR; INTRAVENOUS; SUBCUTANEOUS at 07:42

## 2019-10-06 RX ADMIN — HYDROMORPHONE HYDROCHLORIDE 1 MG: 1 INJECTION, SOLUTION INTRAMUSCULAR; INTRAVENOUS; SUBCUTANEOUS at 04:35

## 2019-10-06 RX ADMIN — DIPHENHYDRAMINE HYDROCHLORIDE 25 MG: 50 INJECTION, SOLUTION INTRAMUSCULAR; INTRAVENOUS at 13:36

## 2019-10-06 RX ADMIN — LOSARTAN POTASSIUM 50 MG: 50 TABLET, FILM COATED ORAL at 21:56

## 2019-10-06 RX ADMIN — PANTOPRAZOLE SODIUM 40 MG: 40 TABLET, DELAYED RELEASE ORAL at 22:05

## 2019-10-06 RX ADMIN — ONDANSETRON HYDROCHLORIDE 4 MG: 2 INJECTION, SOLUTION INTRAMUSCULAR; INTRAVENOUS at 07:43

## 2019-10-06 RX ADMIN — CEFTRIAXONE 1 G: 1 INJECTION, POWDER, FOR SOLUTION INTRAMUSCULAR; INTRAVENOUS at 19:18

## 2019-10-06 RX ADMIN — HYDROMORPHONE HYDROCHLORIDE 1 MG: 1 INJECTION, SOLUTION INTRAMUSCULAR; INTRAVENOUS; SUBCUTANEOUS at 16:22

## 2019-10-06 RX ADMIN — DEXTROSE MONOHYDRATE, SODIUM CHLORIDE, AND POTASSIUM CHLORIDE: 50; 4.5; 2.24 INJECTION, SOLUTION INTRAVENOUS at 07:54

## 2019-10-06 RX ADMIN — VANCOMYCIN HYDROCHLORIDE 1000 MG: 1 INJECTION, POWDER, LYOPHILIZED, FOR SOLUTION INTRAVENOUS at 09:20

## 2019-10-06 RX ADMIN — Medication 10 ML: at 05:33

## 2019-10-06 RX ADMIN — Medication 10 ML: at 21:56

## 2019-10-06 NOTE — PROGRESS NOTES
Bedside shift change report given to Nancy Woods RN (oncoming nurse) by Belen Jeffers RN (offgoing nurse). Report included the following information SBAR, Kardex, Procedure Summary, Intake/Output, MAR, Accordion, Recent Results, Med Rec Status and Cardiac Rhythm NSR.

## 2019-10-06 NOTE — PROGRESS NOTES
Bedside and Verbal shift change report given to Lou (oncoming nurse) by Ava Fournier (offgoing nurse). Report included the following information SBAR, Kardex, MAR and Recent Results.

## 2019-10-06 NOTE — PROGRESS NOTES
Pharmacy Automatic Renal Dosing Protocol - Antimicrobials    Indication for Antimicrobials: Sickle Cell Crisis With Fever    Current Regimen of Each Antimicrobial:  Vancomycin 1000 mg IV every 8 hours (Start Date 10/4; Day # 3)  Ceftriaxone 1 g IV every 24 hours (Start Date 10/4; Day # 3)    Previous Antimicrobial Therapy:  None    Vancomycin Goal Level: 15-20 mcg/mL  Vancomycin Levels  Date Dose & Interval Measured (mcg/mL) Steady State (mcg/mL)   10/6 @ 0914 1 g every 8 hours 12.1 13.1                 Date & time of next level: 10/7 @ 1700    Significant Cultures:   10/4 Blood: NGTD x 14 hours- pending    Paralysis, amputations, malnutrition: None documented    Labs:  Recent Labs     10/06/19  0441 10/05/19  0409 10/03/19  2055   CREA 0.68 0.76 1.02   BUN 4* 6 15   WBC 11.5* 13.2* 12.3*     Temp (24hrs), Av.9 °F (37.7 °C), Min:99.2 °F (37.3 °C), Max:101.2 °F (38.4 °C)    Creatinine Clearance (mL/min) or Dialysis: 99 mL/min (SCr rounded to 0.7)    Impression/Plan:   Vancomycin trough resulted as subtherapeutic at 13.1 mcg/mL. Will change to 1250 mg IV every 8 hours for an estimated trough of 16.8 mcg/mL  Will continue current regimen for ceftriaxone as appropriate for indication   Patient still febrile and WBC improved today. Blood culture still pending. Stop date pending     Pharmacy will follow daily and adjust medications as appropriate for renal function and/or serum levels. Thank you,  Winston Segovia PHARMD      Recommended duration of therapy  http://[facility]/Pharmacy/Clinical%20Companion/Duration%20of%20ABX%20therapy. docx    Renal Dosing  http://[facility]/[place]/[facility]/Pharmacy/Clinical%20Companion/Renal%[id number]. pdf

## 2019-10-06 NOTE — PROGRESS NOTES
General Daily Progress Note    Admit Date: 10/3/2019    Subjective:     Patient continues to complain of pain. Current Facility-Administered Medications   Medication Dose Route Frequency    vancomycin (VANCOCIN) 1250 mg in  ml infusion  1,250 mg IntraVENous Q8H    fentaNYL (DURAGESIC) 75 mcg/hr patch 1 Patch  1 Patch TransDERmal Q72H    dextrose 5% - 0.45% NaCl with KCl 30 mEq/L infusion   IntraVENous CONTINUOUS    sodium chloride (NS) flush 5-40 mL  5-40 mL IntraVENous Q8H    sodium chloride (NS) flush 5-40 mL  5-40 mL IntraVENous PRN    naloxone (NARCAN) injection 0.4 mg  0.4 mg IntraVENous PRN    ondansetron (ZOFRAN) injection 4 mg  4 mg IntraVENous Q4H PRN    bisacodyl (DULCOLAX) tablet 5 mg  5 mg Oral DAILY PRN    enoxaparin (LOVENOX) injection 40 mg  40 mg SubCUTAneous Q24H    diphenhydrAMINE (BENADRYL) injection 25 mg  25 mg IntraVENous Q6H PRN    hydroxyurea (HYDREA) chemo cap 500 mg  500 mg Oral DAILY    folic acid (FOLVITE) tablet 1 mg  1 mg Oral QHS    pantoprazole (PROTONIX) tablet 40 mg  40 mg Oral QHS    citalopram (CELEXA) tablet 10 mg  10 mg Oral DAILY    losartan (COZAAR) tablet 50 mg  50 mg Oral QHS    HYDROmorphone (PF) (DILAUDID) injection 1 mg  1 mg IntraVENous Q3H PRN    cefTRIAXone (ROCEPHIN) 1 g in 0.9% sodium chloride (MBP/ADV) 50 mL  1 g IntraVENous Q24H    acetaminophen (TYLENOL) tablet 650 mg  650 mg Oral Q4H PRN        Review of Systems  A comprehensive review of systems was negative.     Objective:     Patient Vitals for the past 24 hrs:   BP Temp Pulse Resp SpO2   10/06/19 0737 148/89 (!) 100.7 °F (38.2 °C) 97 18 96 %   10/06/19 0446 (!) 128/93 99.7 °F (37.6 °C) 92 18 96 %   10/06/19 0015 146/84 99.2 °F (37.3 °C) 98 18 96 %   10/05/19 2000 156/82 99.3 °F (37.4 °C) 97 18 96 %   10/05/19 1632 149/88 (!) 101.2 °F (38.4 °C) (!) 107 18 100 %   10/05/19 1242 (!) 170/93       10/05/19 1240 (!) 170/94 99.2 °F (37.3 °C) 100 22 95 %     No intake/output data recorded. 10/04 1901 - 10/06 0700  In: 2800 [I.V.:2800]  Out: -     Physical Exam:   Visit Vitals  /89 (BP 1 Location: Right arm, BP Patient Position: At rest)   Pulse 97   Temp (!) 100.7 °F (38.2 °C)   Resp 18   Ht 5' 11\" (1.803 m)   Wt 206 lb 2.1 oz (93.5 kg)   SpO2 96%   BMI 28.75 kg/m²     General appearance: alert, cooperative, no distress, appears stated age  Neck: supple, symmetrical, trachea midline, no adenopathy, thyroid: not enlarged, symmetric, no tenderness/mass/nodules, no carotid bruit and no JVD  Lungs: clear to auscultation bilaterally  Heart: regular rate and rhythm, S1, S2 normal, no murmur, click, rub or gallop  Abdomen: soft, non-tender. Bowel sounds normal. No masses,  no organomegaly  Extremities: extremities normal, atraumatic, no cyanosis or edema        Data Review   Recent Results (from the past 24 hour(s))   CBC WITH MANUAL DIFF    Collection Time: 10/06/19  4:41 AM   Result Value Ref Range    WBC 11.5 (H) 3.6 - 11.0 K/uL    RBC 2.15 (L) 3.80 - 5.20 M/uL    HGB 7.4 (L) 11.5 - 16.0 g/dL    HCT 20.9 (L) 35.0 - 47.0 %    MCV 97.2 80.0 - 99.0 FL    MCH 34.4 (H) 26.0 - 34.0 PG    MCHC 35.4 30.0 - 36.5 g/dL    RDW 15.7 (H) 11.5 - 14.5 %    PLATELET 755 (L) 204 - 400 K/uL    MPV 11.9 8.9 - 12.9 FL    NRBC 2.8 (H) 0  WBC    ABSOLUTE NRBC 0.32 (H) 0.00 - 0.01 K/uL    NEUTROPHILS 75 32 - 75 %    BAND NEUTROPHILS 1 0 - 6 %    LYMPHOCYTES 21 12 - 49 %    MONOCYTES 2 (L) 5 - 13 %    EOSINOPHILS 1 0 - 7 %    BASOPHILS 0 0 - 1 %    METAMYELOCYTES 0 0 %    MYELOCYTES 0 0 %    PROMYELOCYTES 0 0 %    BLASTS 0 0 %    OTHER CELL 0 0      IMMATURE GRANULOCYTES 0 %    ABS. NEUTROPHILS 8.8 (H) 1.8 - 8.0 K/UL    ABS. LYMPHOCYTES 2.4 0.8 - 3.5 K/UL    ABS. MONOCYTES 0.2 0.0 - 1.0 K/UL    ABS. EOSINOPHILS 0.1 0.0 - 0.4 K/UL    ABS. BASOPHILS 0.0 0.0 - 0.1 K/UL    ABS. IMM.  GRANS. 0.0 K/UL    DF MANUAL      RBC COMMENTS ANISOCYTOSIS  2+        RBC COMMENTS MACROCYTOSIS  1+        RBC COMMENTS MICROCYTOSIS  1+        RBC COMMENTS OVALOCYTES  1+        RBC COMMENTS HYPOCHROMIA  1+       METABOLIC PANEL, COMPREHENSIVE    Collection Time: 10/06/19  4:41 AM   Result Value Ref Range    Sodium 136 136 - 145 mmol/L    Potassium 4.1 3.5 - 5.1 mmol/L    Chloride 105 97 - 108 mmol/L    CO2 28 21 - 32 mmol/L    Anion gap 3 (L) 5 - 15 mmol/L    Glucose 124 (H) 65 - 100 mg/dL    BUN 4 (L) 6 - 20 MG/DL    Creatinine 0.68 0.55 - 1.02 MG/DL    BUN/Creatinine ratio 6 (L) 12 - 20      GFR est AA >60 >60 ml/min/1.73m2    GFR est non-AA >60 >60 ml/min/1.73m2    Calcium 8.4 (L) 8.5 - 10.1 MG/DL    Bilirubin, total 2.2 (H) 0.2 - 1.0 MG/DL    ALT (SGPT) 18 12 - 78 U/L    AST (SGOT) 57 (H) 15 - 37 U/L    Alk. phosphatase 148 (H) 45 - 117 U/L    Protein, total 7.8 6.4 - 8.2 g/dL    Albumin 3.3 (L) 3.5 - 5.0 g/dL    Globulin 4.5 (H) 2.0 - 4.0 g/dL    A-G Ratio 0.7 (L) 1.1 - 2.2     VANCOMYCIN, TROUGH    Collection Time: 10/06/19  9:14 AM   Result Value Ref Range    Vancomycin,trough 12.1 (H) 5.0 - 10.0 ug/mL    Reported dose date: NOT PROVIDED      Reported dose time: NOT PROVIDED      Reported dose: NOT PROVIDED UNITS           Assessment:     Active Problems:    [facility] (9[facility]8/2018)        Plan:     1. Bilirubin decreasing with hemoglobin stabilizing. No transfusion for now. Continue pain control along with hydration. 2.  Slow progress.

## 2019-10-07 LAB
ALBUMIN SERPL-MCNC: 3.1 G/DL (ref 3.5–5)
ALBUMIN/GLOB SERPL: 0.7 {RATIO} (ref 1.1–2.2)
ALP SERPL-CCNC: 131 U/L (ref 45–117)
ALT SERPL-CCNC: 16 U/L (ref 12–78)
ANION GAP SERPL CALC-SCNC: 3 MMOL/L (ref 5–15)
AST SERPL-CCNC: 40 U/L (ref 15–37)
BASOPHILS # BLD: 0 K/UL (ref 0–0.1)
BASOPHILS NFR BLD: 0 % (ref 0–1)
BILIRUB SERPL-MCNC: 1.4 MG/DL (ref 0.2–1)
BLASTS NFR BLD MANUAL: 0 %
BUN SERPL-MCNC: 6 MG/DL (ref 6–20)
BUN/CREAT SERPL: 8 (ref 12–20)
CALCIUM SERPL-MCNC: 8.2 MG/DL (ref 8.5–10.1)
CHLORIDE SERPL-SCNC: 109 MMOL/L (ref 97–108)
CO2 SERPL-SCNC: 27 MMOL/L (ref 21–32)
CREAT SERPL-MCNC: 0.76 MG/DL (ref 0.55–1.02)
DIFFERENTIAL METHOD BLD: ABNORMAL
EOSINOPHIL # BLD: 0.8 K/UL (ref 0–0.4)
EOSINOPHIL NFR BLD: 9 % (ref 0–7)
ERYTHROCYTE [DISTWIDTH] IN BLOOD BY AUTOMATED COUNT: 15.9 % (ref 11.5–14.5)
GLOBULIN SER CALC-MCNC: 4.4 G/DL (ref 2–4)
GLUCOSE SERPL-MCNC: 104 MG/DL (ref 65–100)
HCT VFR BLD AUTO: 20.1 % (ref 35–47)
HGB BLD-MCNC: 6.7 G/DL (ref 11.5–16)
IMM GRANULOCYTES # BLD AUTO: 0 K/UL
IMM GRANULOCYTES NFR BLD AUTO: 0 %
LYMPHOCYTES # BLD: 2.9 K/UL (ref 0.8–3.5)
LYMPHOCYTES NFR BLD: 33 % (ref 12–49)
MCH RBC QN AUTO: 32.8 PG (ref 26–34)
MCHC RBC AUTO-ENTMCNC: 33.3 G/DL (ref 30–36.5)
MCV RBC AUTO: 98.5 FL (ref 80–99)
METAMYELOCYTES NFR BLD MANUAL: 0 %
MONOCYTES # BLD: 0.4 K/UL (ref 0–1)
MONOCYTES NFR BLD: 5 % (ref 5–13)
MYELOCYTES NFR BLD MANUAL: 0 %
NEUTS BAND NFR BLD MANUAL: 0 % (ref 0–6)
NEUTS SEG # BLD: 4.6 K/UL (ref 1.8–8)
NEUTS SEG NFR BLD: 53 % (ref 32–75)
NRBC # BLD: 0.13 K/UL (ref 0–0.01)
NRBC BLD-RTO: 1.5 PER 100 WBC
OTHER CELLS NFR BLD MANUAL: 0 %
PLATELET # BLD AUTO: 117 K/UL (ref 150–400)
PMV BLD AUTO: 11.5 FL (ref 8.9–12.9)
POTASSIUM SERPL-SCNC: 4.1 MMOL/L (ref 3.5–5.1)
PROMYELOCYTES NFR BLD MANUAL: 0 %
PROT SERPL-MCNC: 7.5 G/DL (ref 6.4–8.2)
RBC # BLD AUTO: 2.04 M/UL (ref 3.8–5.2)
RBC MORPH BLD: ABNORMAL
SODIUM SERPL-SCNC: 139 MMOL/L (ref 136–145)
WBC # BLD AUTO: 8.7 K/UL (ref 3.6–11)

## 2019-10-07 PROCEDURE — 65660000000 HC RM CCU STEPDOWN

## 2019-10-07 PROCEDURE — 77010033678 HC OXYGEN DAILY

## 2019-10-07 PROCEDURE — 36415 COLL VENOUS BLD VENIPUNCTURE: CPT

## 2019-10-07 PROCEDURE — 74011250636 HC RX REV CODE- 250/636: Performed by: INTERNAL MEDICINE

## 2019-10-07 PROCEDURE — 74011250637 HC RX REV CODE- 250/637: Performed by: INTERNAL MEDICINE

## 2019-10-07 PROCEDURE — 85027 COMPLETE CBC AUTOMATED: CPT

## 2019-10-07 PROCEDURE — C1751 CATH, INF, PER/CENT/MIDLINE: HCPCS

## 2019-10-07 PROCEDURE — 77030018786 HC NDL GD F/USND BARD -B

## 2019-10-07 PROCEDURE — 74011000258 HC RX REV CODE- 258: Performed by: INTERNAL MEDICINE

## 2019-10-07 PROCEDURE — 80053 COMPREHEN METABOLIC PANEL: CPT

## 2019-10-07 PROCEDURE — 94760 N-INVAS EAR/PLS OXIMETRY 1: CPT

## 2019-10-07 PROCEDURE — 76937 US GUIDE VASCULAR ACCESS: CPT

## 2019-10-07 RX ADMIN — HYDROMORPHONE HYDROCHLORIDE 1 MG: 1 INJECTION, SOLUTION INTRAMUSCULAR; INTRAVENOUS; SUBCUTANEOUS at 11:02

## 2019-10-07 RX ADMIN — CITALOPRAM HYDROBROMIDE 10 MG: 20 TABLET, FILM COATED ORAL at 08:30

## 2019-10-07 RX ADMIN — ONDANSETRON HYDROCHLORIDE 4 MG: 2 INJECTION, SOLUTION INTRAMUSCULAR; INTRAVENOUS at 01:09

## 2019-10-07 RX ADMIN — HYDROMORPHONE HYDROCHLORIDE 1 MG: 1 INJECTION, SOLUTION INTRAMUSCULAR; INTRAVENOUS; SUBCUTANEOUS at 20:30

## 2019-10-07 RX ADMIN — HYDROMORPHONE HYDROCHLORIDE 1 MG: 1 INJECTION, SOLUTION INTRAMUSCULAR; INTRAVENOUS; SUBCUTANEOUS at 01:09

## 2019-10-07 RX ADMIN — Medication 10 ML: at 14:07

## 2019-10-07 RX ADMIN — CEFTRIAXONE 1 G: 1 INJECTION, POWDER, FOR SOLUTION INTRAMUSCULAR; INTRAVENOUS at 17:14

## 2019-10-07 RX ADMIN — LOSARTAN POTASSIUM 50 MG: 50 TABLET, FILM COATED ORAL at 21:12

## 2019-10-07 RX ADMIN — HYDROMORPHONE HYDROCHLORIDE 1 MG: 1 INJECTION, SOLUTION INTRAMUSCULAR; INTRAVENOUS; SUBCUTANEOUS at 07:18

## 2019-10-07 RX ADMIN — DIPHENHYDRAMINE HYDROCHLORIDE 25 MG: 50 INJECTION, SOLUTION INTRAMUSCULAR; INTRAVENOUS at 07:18

## 2019-10-07 RX ADMIN — VANCOMYCIN HYDROCHLORIDE 1250 MG: 10 INJECTION, POWDER, LYOPHILIZED, FOR SOLUTION INTRAVENOUS at 08:29

## 2019-10-07 RX ADMIN — Medication 10 ML: at 06:48

## 2019-10-07 RX ADMIN — VANCOMYCIN HYDROCHLORIDE 1250 MG: 10 INJECTION, POWDER, LYOPHILIZED, FOR SOLUTION INTRAVENOUS at 01:07

## 2019-10-07 RX ADMIN — DEXTROSE MONOHYDRATE, SODIUM CHLORIDE, AND POTASSIUM CHLORIDE: 50; 4.5; 2.24 INJECTION, SOLUTION INTRAVENOUS at 16:52

## 2019-10-07 RX ADMIN — DIPHENHYDRAMINE HYDROCHLORIDE 25 MG: 50 INJECTION, SOLUTION INTRAMUSCULAR; INTRAVENOUS at 16:50

## 2019-10-07 RX ADMIN — HYDROMORPHONE HYDROCHLORIDE 1 MG: 1 INJECTION, SOLUTION INTRAMUSCULAR; INTRAVENOUS; SUBCUTANEOUS at 16:50

## 2019-10-07 RX ADMIN — DEXTROSE MONOHYDRATE, SODIUM CHLORIDE, AND POTASSIUM CHLORIDE: 50; 4.5; 2.24 INJECTION, SOLUTION INTRAVENOUS at 06:49

## 2019-10-07 RX ADMIN — ONDANSETRON HYDROCHLORIDE 4 MG: 2 INJECTION, SOLUTION INTRAMUSCULAR; INTRAVENOUS at 11:00

## 2019-10-07 RX ADMIN — PANTOPRAZOLE SODIUM 40 MG: 40 TABLET, DELAYED RELEASE ORAL at 21:12

## 2019-10-07 RX ADMIN — Medication 10 ML: at 21:13

## 2019-10-07 RX ADMIN — DIPHENHYDRAMINE HYDROCHLORIDE 25 MG: 50 INJECTION, SOLUTION INTRAMUSCULAR; INTRAVENOUS at 01:09

## 2019-10-07 RX ADMIN — ENOXAPARIN SODIUM 40 MG: 40 INJECTION, SOLUTION INTRAVENOUS; SUBCUTANEOUS at 06:48

## 2019-10-07 RX ADMIN — ONDANSETRON HYDROCHLORIDE 4 MG: 2 INJECTION, SOLUTION INTRAMUSCULAR; INTRAVENOUS at 07:18

## 2019-10-07 RX ADMIN — FOLIC ACID 1 MG: 1 TABLET ORAL at 21:12

## 2019-10-07 RX ADMIN — HYDROMORPHONE HYDROCHLORIDE 1 MG: 1 INJECTION, SOLUTION INTRAMUSCULAR; INTRAVENOUS; SUBCUTANEOUS at 04:24

## 2019-10-07 RX ADMIN — VANCOMYCIN HYDROCHLORIDE 1250 MG: 10 INJECTION, POWDER, LYOPHILIZED, FOR SOLUTION INTRAVENOUS at 20:31

## 2019-10-07 RX ADMIN — ONDANSETRON HYDROCHLORIDE 4 MG: 2 INJECTION, SOLUTION INTRAMUSCULAR; INTRAVENOUS at 16:50

## 2019-10-07 RX ADMIN — HYDROMORPHONE HYDROCHLORIDE 1 MG: 1 INJECTION, SOLUTION INTRAMUSCULAR; INTRAVENOUS; SUBCUTANEOUS at 14:07

## 2019-10-07 RX ADMIN — HYDROXYUREA 500 MG: 500 CAPSULE ORAL at 08:29

## 2019-10-07 NOTE — PROGRESS NOTES
MIDLINE Insertion Procedure  Note:     Procedure explained to  pt  along with risks and benefits. Procedure teaching completed. Pre procedure assessment done. Maximum sterile barrier precautions observed throughout procedure. Lidocaine 1 %  2.0    ml sc injected to site prior to access the vein . Cannulated   basilic  vein using ultrasound guidance. Inserted  4   Fr. Single   lumen midline to   right    arm. Blood return verified and  flushed with 20ml normal saline  to  port. Sterile dressing applied with biopatch, statLock and occlusive dressing as per protocol. Curos cap applied to port. Patient tolerated procedure well with minimal blood loss. Reason for access : Reliable IV Access / limited vascular access   / Blood transfusion  Complications related to insertion : None   See nursing message  for midline reminders  Inserted by : Nneka BOCANEGRA. SAMMY. PICC Nurse  Assisted by : Hernán Adames RN PICC Nurse  Total Length : 13  cm   External Length :   0     cm      Arm circumference :    32   cm  Catheter occupies   12   % of vein. Type of Midline:  [employer] Midline  Ref#:  [id number]  Lot#:   [id number]  Expiration Date:    2021-01-31      Nneka BOCANEGRA. CMSRGARY. PICC nurse.  Vascular Access Team

## 2019-10-07 NOTE — PROGRESS NOTES
Bedside shift change report given to Gabriella Harding RN (oncoming nurse) by Sheila Best RN   (offgoing nurse). Report included the following information SBAR, Kardex, ED Summary, [facility] and Recent Results.

## 2019-10-07 NOTE — PROGRESS NOTES
Bedside and Verbal shift change report given to Aleja (oncoming nurse) by Gayle Rubio RN (offgoing nurse). Report included the following information Kardex, MAR and Recent Results.

## 2019-10-07 NOTE — PROGRESS NOTES
Report called to [facility] on med tele. Patient being transferred to room 3258. Patient notified with transfer order and new room number.

## 2019-10-07 NOTE — PROGRESS NOTES
Problem: Falls - Risk of  Goal: *Absence of Falls  Description  Document Laisha Zavala Fall Risk and appropriate interventions in the flowsheet.   Outcome: Progressing Towards Goal  Note:   Fall Risk Interventions:       Mentation Interventions: Adequate sleep, hydration, pain control, Bed/chair exit alarm, Gait belt with transfers/ambulation    Medication Interventions: Patient to call before getting OOB, Teach patient to arise slowly         History of Falls Interventions: Consult care management for discharge planning, Door open when patient unattended

## 2019-10-07 NOTE — PROGRESS NOTES
General Daily Progress Note    Admit Date: 10/3/2019    Subjective:     Patient continues to complain of pain but improving. Current Facility-Administered Medications   Medication Dose Route Frequency    vancomycin (VANCOCIN) 1250 mg in  ml infusion  1,250 mg IntraVENous Q8H    fentaNYL (DURAGESIC) 75 mcg/hr patch 1 Patch  1 Patch TransDERmal Q72H    dextrose 5% - 0.45% NaCl with KCl 30 mEq/L infusion   IntraVENous CONTINUOUS    sodium chloride (NS) flush 5-40 mL  5-40 mL IntraVENous Q8H    sodium chloride (NS) flush 5-40 mL  5-40 mL IntraVENous PRN    naloxone (NARCAN) injection 0.4 mg  0.4 mg IntraVENous PRN    ondansetron (ZOFRAN) injection 4 mg  4 mg IntraVENous Q4H PRN    bisacodyl (DULCOLAX) tablet 5 mg  5 mg Oral DAILY PRN    enoxaparin (LOVENOX) injection 40 mg  40 mg SubCUTAneous Q24H    diphenhydrAMINE (BENADRYL) injection 25 mg  25 mg IntraVENous Q6H PRN    hydroxyurea (HYDREA) chemo cap 500 mg  500 mg Oral DAILY    folic acid (FOLVITE) tablet 1 mg  1 mg Oral QHS    pantoprazole (PROTONIX) tablet 40 mg  40 mg Oral QHS    citalopram (CELEXA) tablet 10 mg  10 mg Oral DAILY    losartan (COZAAR) tablet 50 mg  50 mg Oral QHS    HYDROmorphone (PF) (DILAUDID) injection 1 mg  1 mg IntraVENous Q3H PRN    cefTRIAXone (ROCEPHIN) 1 g in 0.9% sodium chloride (MBP/ADV) 50 mL  1 g IntraVENous Q24H    acetaminophen (TYLENOL) tablet 650 mg  650 mg Oral Q4H PRN        Review of Systems  A comprehensive review of systems was negative.     Objective:     Patient Vitals for the past 24 hrs:   BP Temp Pulse Resp SpO2   10/07/19 0813 128/72 98.9 °F (37.2 °C) 82  92 %   10/07/19 0508 108/61 98.8 °F (37.1 °C) 79 18 97 %   10/07/19 0400   79     10/07/19 0107 122/54 98.9 °F (37.2 °C) 81 18 98 %   10/07/19 0000   81     10/06/19 2000   91     10/06/19 1849 108/61 100.1 °F (37.8 °C) 93 18 96 %   10/06/19 1532 115/67 99.4 °F (37.4 °C) 88 16 100 %   10/06/19 1249 148/72 99.4 °F (37.4 °C) 93 16 98 %     No intake/output data recorded. No intake/output data recorded. Physical Exam:   Visit Vitals  /72   Pulse 82   Temp 98.9 °F (37.2 °C)   Resp 18   Ht 5' 11\" (1.803 m)   Wt 206 lb 2.1 oz (93.5 kg)   SpO2 92%   BMI 28.75 kg/m²     General appearance: alert, cooperative, no distress, appears stated age  Eyes: negative  Neck: supple, symmetrical, trachea midline, no adenopathy, thyroid: not enlarged, symmetric, no tenderness/mass/nodules, no carotid bruit and no JVD  Lungs: clear to auscultation bilaterally  Heart: regular rate and rhythm, S1, S2 normal, no murmur, click, rub or gallop  Abdomen: soft, non-tender.  Bowel sounds normal. No masses,  no organomegaly  Extremities: extremities normal, atraumatic, no cyanosis or edema        Data Review   Recent Results (from the past 24 hour(s))   VANCOMYCIN, TROUGH    Collection Time: 10/06/19  9:14 AM   Result Value Ref Range    Vancomycin,trough 12.1 (H) 5.0 - 10.0 ug/mL    Reported dose date: NOT PROVIDED      Reported dose time: NOT PROVIDED      Reported dose: NOT PROVIDED UNITS   GLUCOSE, POC    Collection Time: 10/06/19  9:04 PM   Result Value Ref Range    Glucose (POC) 138 (H) 65 - 100 mg/dL    Performed by Hipolito Yan    CBC WITH MANUAL DIFF    Collection Time: 10/07/19  4:19 AM   Result Value Ref Range    WBC 8.7 3.6 - 11.0 K/uL    RBC 2.04 (L) 3.80 - 5.20 M/uL    HGB 6.7 (L) 11.5 - 16.0 g/dL    HCT 20.1 (L) 35.0 - 47.0 %    MCV 98.5 80.0 - 99.0 FL    MCH 32.8 26.0 - 34.0 PG    MCHC 33.3 30.0 - 36.5 g/dL    RDW 15.9 (H) 11.5 - 14.5 %    PLATELET 173 (L) 618 - 400 K/uL    MPV 11.5 8.9 - 12.9 FL    NRBC 1.5 (H) 0  WBC    ABSOLUTE NRBC 0.13 (H) 0.00 - 0.01 K/uL    NEUTROPHILS 53 32 - 75 %    BAND NEUTROPHILS 0 0 - 6 %    LYMPHOCYTES 33 12 - 49 %    MONOCYTES 5 5 - 13 %    EOSINOPHILS 9 (H) 0 - 7 %    BASOPHILS 0 0 - 1 %    METAMYELOCYTES 0 0 %    MYELOCYTES 0 0 %    PROMYELOCYTES 0 0 %    BLASTS 0 0 %    OTHER CELL 0 0      IMMATURE GRANULOCYTES 0 %    ABS. NEUTROPHILS 4.6 1.8 - 8.0 K/UL    ABS. LYMPHOCYTES 2.9 0.8 - 3.5 K/UL    ABS. MONOCYTES 0.4 0.0 - 1.0 K/UL    ABS. EOSINOPHILS 0.8 (H) 0.0 - 0.4 K/UL    ABS. BASOPHILS 0.0 0.0 - 0.1 K/UL    ABS. IMM. GRANS. 0.0 K/UL    DF MANUAL      RBC COMMENTS ANISOCYTOSIS  1+        RBC COMMENTS TARGET CELLS  1+        RBC COMMENTS HYPOCHROMIA  1+        RBC COMMENTS SICKLE CELLS  PRESENT       METABOLIC PANEL, COMPREHENSIVE    Collection Time: 10/07/19  4:19 AM   Result Value Ref Range    Sodium 139 136 - 145 mmol/L    Potassium 4.1 3.5 - 5.1 mmol/L    Chloride 109 (H) 97 - 108 mmol/L    CO2 27 21 - 32 mmol/L    Anion gap 3 (L) 5 - 15 mmol/L    Glucose 104 (H) 65 - 100 mg/dL    BUN 6 6 - 20 MG/DL    Creatinine 0.76 0.55 - 1.02 MG/DL    BUN/Creatinine ratio 8 (L) 12 - 20      GFR est AA >60 >60 ml/min/1.73m2    GFR est non-AA >60 >60 ml/min/1.73m2    Calcium 8.2 (L) 8.5 - 10.1 MG/DL    Bilirubin, total 1.4 (H) 0.2 - 1.0 MG/DL    ALT (SGPT) 16 12 - 78 U/L    AST (SGOT) 40 (H) 15 - 37 U/L    Alk. phosphatase 131 (H) 45 - 117 U/L    Protein, total 7.5 6.4 - 8.2 g/dL    Albumin 3.1 (L) 3.5 - 5.0 g/dL    Globulin 4.4 (H) 2.0 - 4.0 g/dL    A-G Ratio 0.7 (L) 1.1 - 2.2             Assessment:     Active Problems:    Sickle cell pain crisis ([facility] [address] (9/8/2018)        Plan:     1. Continue treatment of pulmonary crises. Improvement noted both clinically and via labs. 2.  No major changes for now.

## 2019-10-07 NOTE — PROGRESS NOTES
Bedside and Verbal shift change report given to Janusz BEEBE RN (oncoming nurse) by Jeffy Pascual (offgoing nurse).  Report included the following information SBAR, Kardex, Intake/Output, MAR, Accordion, Recent Results and Cardiac Rhythm nsr. '

## 2019-10-07 NOTE — PROGRESS NOTES
KRISTINA: home with family support  1) family to transport patient at d/c  2) patient will need PCP follow up   3) patient will need 2nd IM letter prior to d/c    Reason for Readmission: sickle cell crisis             RRAT Score and Risk Level:   19       Level of Readmission: moderate         Care Conference scheduled:   Not at this time        Resources/supports as identified by patient/family: strong family support          Top Challenges facing patient (as identified by patient/family and CM): Finances/Medication cost?  No concerns, has insurance to assist with RX costs     [address]    Transportation  patient independent with transportation or dtr to assist when needed    Support system or lack thereof? Family support, daughter and brother lives nearby patient, son lives out of state ([place])  Living arrangements? Patient resides alone single level home 4 entry steps     Self-care/ADLs/Cognition? Independent with ALDs, IADLs, and driving          Current Advanced Directive/Advance Care Plan:  Not at this time            Plan for utilizing home health:   Patient declined need for hh              Transition of Care Plan:    Based on readmission, the patient's previous Plan of Care   has been evaluated and/or modified. The current Transition of Care Plan is:            Patient is a 63 y/o  [place] female who was readmitted to [facility] for sickle cell crisis. Patient confirmed demographic information. Patient denied the need for any home health upon d/c. Patient confirmed that she will have transportation upon d/c. CM will continue to follow. Care Management Interventions  PCP Verified by CM: Yes  Mode of Transport at Discharge:  Other (see comment)  Transition of Care Consult (CM Consult): Discharge Planning  Discharge Durable Medical Equipment: No  Physical Therapy Consult: No  Occupational Therapy Consult: No  Speech Therapy Consult: No  Current Support Network: Lives Alone, Family Lives [place], Own Home  Confirm Follow Up Transport: Family  Plan discussed with Pt/Family/Caregiver: Yes  Discharge Location  Discharge Placement: [place], [place], [address]

## 2019-10-08 LAB
ALBUMIN SERPL-MCNC: 3 G/DL (ref 3.5–5)
ALBUMIN/GLOB SERPL: 0.8 {RATIO} (ref 1.1–2.2)
ALP SERPL-CCNC: 110 U/L (ref 45–117)
ALT SERPL-CCNC: 17 U/L (ref 12–78)
ANION GAP SERPL CALC-SCNC: 3 MMOL/L (ref 5–15)
AST SERPL-CCNC: 32 U/L (ref 15–37)
BASOPHILS # BLD: 0 K/UL (ref 0–0.1)
BASOPHILS NFR BLD: 0 % (ref 0–1)
BILIRUB SERPL-MCNC: 1.1 MG/DL (ref 0.2–1)
BLASTS NFR BLD MANUAL: 0 %
BUN SERPL-MCNC: 6 MG/DL (ref 6–20)
BUN/CREAT SERPL: 8 (ref 12–20)
CALCIUM SERPL-MCNC: 8.3 MG/DL (ref 8.5–10.1)
CHLORIDE SERPL-SCNC: 107 MMOL/L (ref 97–108)
CO2 SERPL-SCNC: 29 MMOL/L (ref 21–32)
CREAT SERPL-MCNC: 0.75 MG/DL (ref 0.55–1.02)
DATE LAST DOSE: ABNORMAL
DIFFERENTIAL METHOD BLD: ABNORMAL
EOSINOPHIL # BLD: 0.5 K/UL (ref 0–0.4)
EOSINOPHIL NFR BLD: 7 % (ref 0–7)
ERYTHROCYTE [DISTWIDTH] IN BLOOD BY AUTOMATED COUNT: 15.8 % (ref 11.5–14.5)
GLOBULIN SER CALC-MCNC: 4 G/DL (ref 2–4)
GLUCOSE SERPL-MCNC: 99 MG/DL (ref 65–100)
HCT VFR BLD AUTO: 18.2 % (ref 35–47)
HGB BLD-MCNC: 6.3 G/DL (ref 11.5–16)
IMM GRANULOCYTES # BLD AUTO: 0 K/UL
IMM GRANULOCYTES NFR BLD AUTO: 0 %
LYMPHOCYTES # BLD: 3.6 K/UL (ref 0.8–3.5)
LYMPHOCYTES NFR BLD: 50 % (ref 12–49)
MCH RBC QN AUTO: 34.2 PG (ref 26–34)
MCHC RBC AUTO-ENTMCNC: 34.6 G/DL (ref 30–36.5)
MCV RBC AUTO: 98.9 FL (ref 80–99)
METAMYELOCYTES NFR BLD MANUAL: 0 %
MONOCYTES # BLD: 0.5 K/UL (ref 0–1)
MONOCYTES NFR BLD: 7 % (ref 5–13)
MYELOCYTES NFR BLD MANUAL: 0 %
NEUTS BAND NFR BLD MANUAL: 0 % (ref 0–6)
NEUTS SEG # BLD: 2.6 K/UL (ref 1.8–8)
NEUTS SEG NFR BLD: 36 % (ref 32–75)
NRBC # BLD: 0.09 K/UL (ref 0–0.01)
NRBC BLD-RTO: 1.2 PER 100 WBC
OTHER CELLS NFR BLD MANUAL: 0 %
PLATELET # BLD AUTO: 121 K/UL (ref 150–400)
PMV BLD AUTO: 11.4 FL (ref 8.9–12.9)
POTASSIUM SERPL-SCNC: 4.1 MMOL/L (ref 3.5–5.1)
PROMYELOCYTES NFR BLD MANUAL: 0 %
PROT SERPL-MCNC: 7 G/DL (ref 6.4–8.2)
RBC # BLD AUTO: 1.84 M/UL (ref 3.8–5.2)
RBC MORPH BLD: ABNORMAL
REPORTED DOSE,DOSE: ABNORMAL UNITS
REPORTED DOSE/TIME,TMG: ABNORMAL
RETICS # AUTO: 0.06 M/UL (ref 0.02–0.08)
RETICS/RBC NFR AUTO: 3.5 % (ref 0.7–2.1)
SODIUM SERPL-SCNC: 139 MMOL/L (ref 136–145)
VANCOMYCIN TROUGH SERPL-MCNC: 21.1 UG/ML (ref 5–10)
WBC # BLD AUTO: 7.2 K/UL (ref 3.6–11)

## 2019-10-08 PROCEDURE — 74011250636 HC RX REV CODE- 250/636: Performed by: INTERNAL MEDICINE

## 2019-10-08 PROCEDURE — 30233N1 TRANSFUSION OF NONAUTOLOGOUS RED BLOOD CELLS INTO PERIPHERAL VEIN, PERCUTANEOUS APPROACH: ICD-10-PCS | Performed by: INTERNAL MEDICINE

## 2019-10-08 PROCEDURE — 77010033678 HC OXYGEN DAILY

## 2019-10-08 PROCEDURE — 86923 COMPATIBILITY TEST ELECTRIC: CPT

## 2019-10-08 PROCEDURE — 36430 TRANSFUSION BLD/BLD COMPNT: CPT

## 2019-10-08 PROCEDURE — 74011250637 HC RX REV CODE- 250/637: Performed by: INTERNAL MEDICINE

## 2019-10-08 PROCEDURE — P9016 RBC LEUKOCYTES REDUCED: HCPCS

## 2019-10-08 PROCEDURE — 80202 ASSAY OF VANCOMYCIN: CPT

## 2019-10-08 PROCEDURE — 36415 COLL VENOUS BLD VENIPUNCTURE: CPT

## 2019-10-08 PROCEDURE — 94760 N-INVAS EAR/PLS OXIMETRY 1: CPT

## 2019-10-08 PROCEDURE — 85027 COMPLETE CBC AUTOMATED: CPT

## 2019-10-08 PROCEDURE — 86900 BLOOD TYPING SEROLOGIC ABO: CPT

## 2019-10-08 PROCEDURE — 85660 RBC SICKLE CELL TEST: CPT

## 2019-10-08 PROCEDURE — 65660000000 HC RM CCU STEPDOWN

## 2019-10-08 PROCEDURE — 80053 COMPREHEN METABOLIC PANEL: CPT

## 2019-10-08 PROCEDURE — 85045 AUTOMATED RETICULOCYTE COUNT: CPT

## 2019-10-08 PROCEDURE — 86902 BLOOD TYPE ANTIGEN DONOR EA: CPT

## 2019-10-08 RX ORDER — SODIUM CHLORIDE 9 MG/ML
250 INJECTION, SOLUTION INTRAVENOUS AS NEEDED
Status: DISCONTINUED | OUTPATIENT
Start: 2019-10-08 | End: 2019-10-17 | Stop reason: HOSPADM

## 2019-10-08 RX ORDER — POLYETHYLENE GLYCOL 3350 17 G/17G
17 POWDER, FOR SOLUTION ORAL DAILY
Status: DISCONTINUED | OUTPATIENT
Start: 2019-10-08 | End: 2019-10-17 | Stop reason: HOSPADM

## 2019-10-08 RX ADMIN — DIPHENHYDRAMINE HYDROCHLORIDE 25 MG: 50 INJECTION, SOLUTION INTRAMUSCULAR; INTRAVENOUS at 12:59

## 2019-10-08 RX ADMIN — Medication 10 ML: at 21:14

## 2019-10-08 RX ADMIN — HYDROMORPHONE HYDROCHLORIDE 1 MG: 1 INJECTION, SOLUTION INTRAMUSCULAR; INTRAVENOUS; SUBCUTANEOUS at 10:00

## 2019-10-08 RX ADMIN — ONDANSETRON HYDROCHLORIDE 4 MG: 2 INJECTION, SOLUTION INTRAMUSCULAR; INTRAVENOUS at 17:32

## 2019-10-08 RX ADMIN — HYDROMORPHONE HYDROCHLORIDE 1 MG: 1 INJECTION, SOLUTION INTRAMUSCULAR; INTRAVENOUS; SUBCUTANEOUS at 03:32

## 2019-10-08 RX ADMIN — HYDROMORPHONE HYDROCHLORIDE 1 MG: 1 INJECTION, SOLUTION INTRAMUSCULAR; INTRAVENOUS; SUBCUTANEOUS at 06:40

## 2019-10-08 RX ADMIN — Medication 10 ML: at 05:15

## 2019-10-08 RX ADMIN — ONDANSETRON HYDROCHLORIDE 4 MG: 2 INJECTION, SOLUTION INTRAMUSCULAR; INTRAVENOUS at 12:57

## 2019-10-08 RX ADMIN — LOSARTAN POTASSIUM 50 MG: 50 TABLET, FILM COATED ORAL at 21:13

## 2019-10-08 RX ADMIN — DIPHENHYDRAMINE HYDROCHLORIDE 25 MG: 50 INJECTION, SOLUTION INTRAMUSCULAR; INTRAVENOUS at 06:40

## 2019-10-08 RX ADMIN — HYDROMORPHONE HYDROCHLORIDE 1 MG: 1 INJECTION, SOLUTION INTRAMUSCULAR; INTRAVENOUS; SUBCUTANEOUS at 19:14

## 2019-10-08 RX ADMIN — ONDANSETRON HYDROCHLORIDE 4 MG: 2 INJECTION, SOLUTION INTRAMUSCULAR; INTRAVENOUS at 06:40

## 2019-10-08 RX ADMIN — ENOXAPARIN SODIUM 40 MG: 40 INJECTION, SOLUTION INTRAVENOUS; SUBCUTANEOUS at 05:15

## 2019-10-08 RX ADMIN — VANCOMYCIN HYDROCHLORIDE 1250 MG: 10 INJECTION, POWDER, LYOPHILIZED, FOR SOLUTION INTRAVENOUS at 03:33

## 2019-10-08 RX ADMIN — CITALOPRAM HYDROBROMIDE 10 MG: 20 TABLET, FILM COATED ORAL at 09:10

## 2019-10-08 RX ADMIN — HYDROMORPHONE HYDROCHLORIDE 1 MG: 1 INJECTION, SOLUTION INTRAMUSCULAR; INTRAVENOUS; SUBCUTANEOUS at 22:18

## 2019-10-08 RX ADMIN — Medication 10 ML: at 14:34

## 2019-10-08 RX ADMIN — HYDROMORPHONE HYDROCHLORIDE 1 MG: 1 INJECTION, SOLUTION INTRAMUSCULAR; INTRAVENOUS; SUBCUTANEOUS at 00:23

## 2019-10-08 RX ADMIN — FOLIC ACID 1 MG: 1 TABLET ORAL at 21:13

## 2019-10-08 RX ADMIN — PANTOPRAZOLE SODIUM 40 MG: 40 TABLET, DELAYED RELEASE ORAL at 21:12

## 2019-10-08 RX ADMIN — ONDANSETRON HYDROCHLORIDE 4 MG: 2 INJECTION, SOLUTION INTRAMUSCULAR; INTRAVENOUS at 00:22

## 2019-10-08 RX ADMIN — Medication 1 CAPSULE: at 09:32

## 2019-10-08 RX ADMIN — ONDANSETRON HYDROCHLORIDE 4 MG: 2 INJECTION, SOLUTION INTRAMUSCULAR; INTRAVENOUS at 22:18

## 2019-10-08 RX ADMIN — DIPHENHYDRAMINE HYDROCHLORIDE 25 MG: 50 INJECTION, SOLUTION INTRAMUSCULAR; INTRAVENOUS at 00:22

## 2019-10-08 RX ADMIN — HYDROMORPHONE HYDROCHLORIDE 1 MG: 1 INJECTION, SOLUTION INTRAMUSCULAR; INTRAVENOUS; SUBCUTANEOUS at 16:09

## 2019-10-08 RX ADMIN — POLYETHYLENE GLYCOL 3350 17 G: 17 POWDER, FOR SOLUTION ORAL at 10:01

## 2019-10-08 RX ADMIN — DEXTROSE MONOHYDRATE, SODIUM CHLORIDE, AND POTASSIUM CHLORIDE: 50; 4.5; 2.24 INJECTION, SOLUTION INTRAVENOUS at 09:34

## 2019-10-08 RX ADMIN — HYDROXYUREA 500 MG: 500 CAPSULE ORAL at 09:10

## 2019-10-08 RX ADMIN — DIPHENHYDRAMINE HYDROCHLORIDE 25 MG: 50 INJECTION, SOLUTION INTRAMUSCULAR; INTRAVENOUS at 19:14

## 2019-10-08 RX ADMIN — HYDROMORPHONE HYDROCHLORIDE 1 MG: 1 INJECTION, SOLUTION INTRAMUSCULAR; INTRAVENOUS; SUBCUTANEOUS at 13:00

## 2019-10-08 NOTE — PROGRESS NOTES
General Daily Progress Note    Admit Date: 10/3/2019    Subjective:     Patient continues to complain of pain. Current Facility-Administered Medications   Medication Dose Route Frequency    lactobac ac& pc-s.therm-b.anim (CARLITOS Q/RISAQUAD)  1 Cap Oral DAILY    0.9% sodium chloride infusion 250 mL  250 mL IntraVENous PRN    fentaNYL (DURAGESIC) 75 mcg/hr patch 1 Patch  1 Patch TransDERmal Q72H    dextrose 5% - 0.45% NaCl with KCl 30 mEq/L infusion   IntraVENous CONTINUOUS    sodium chloride (NS) flush 5-40 mL  5-40 mL IntraVENous Q8H    sodium chloride (NS) flush 5-40 mL  5-40 mL IntraVENous PRN    naloxone (NARCAN) injection 0.4 mg  0.4 mg IntraVENous PRN    ondansetron (ZOFRAN) injection 4 mg  4 mg IntraVENous Q4H PRN    bisacodyl (DULCOLAX) tablet 5 mg  5 mg Oral DAILY PRN    enoxaparin (LOVENOX) injection 40 mg  40 mg SubCUTAneous Q24H    diphenhydrAMINE (BENADRYL) injection 25 mg  25 mg IntraVENous Q6H PRN    hydroxyurea (HYDREA) chemo cap 500 mg  500 mg Oral DAILY    folic acid (FOLVITE) tablet 1 mg  1 mg Oral QHS    pantoprazole (PROTONIX) tablet 40 mg  40 mg Oral QHS    citalopram (CELEXA) tablet 10 mg  10 mg Oral DAILY    losartan (COZAAR) tablet 50 mg  50 mg Oral QHS    HYDROmorphone (PF) (DILAUDID) injection 1 mg  1 mg IntraVENous Q3H PRN    acetaminophen (TYLENOL) tablet 650 mg  650 mg Oral Q4H PRN        Review of Systems  A comprehensive review of systems was negative.     Objective:     Patient Vitals for the past 24 hrs:   BP Temp Pulse Resp SpO2   10/08/19 0808 121/90 99.3 °F (37.4 °C) 83 18 94 %   10/08/19 0340 145/78 99.4 °F (37.4 °C) 87 18 97 %   10/07/19 2340 115/82 99.2 °F (37.3 °C) 86 18 95 %   10/07/19 2036 125/73 100.1 °F (37.8 °C) 84 18 94 %   10/07/19 1644 138/74 97.9 °F (36.6 °C) 92 16    10/07/19 1121 113/78 98.8 °F (37.1 °C) 90 16 95 %     10/08 0701 - 10/08 1900  In: 360 [P.O.:360]  Out: -   10/06 1901 - 10/08 0700  In: 1211.7 [I.V.:1211.7]  Out: -     Physical Exam:   Visit Vitals  /90 (BP 1 Location: Left arm, BP Patient Position: At rest)   Pulse 83   Temp 99.3 °F (37.4 °C)   Resp 18   Ht 5' 11\" (1.803 m)   Wt 206 lb 2.1 oz (93.5 kg)   SpO2 94%   BMI 28.75 kg/m²     General appearance: alert, cooperative, no distress, appears stated age  Neck: supple, symmetrical, trachea midline, no adenopathy, thyroid: not enlarged, symmetric, no tenderness/mass/nodules, no carotid bruit and no JVD  Lungs: clear to auscultation bilaterally  Heart: regular rate and rhythm, S1, S2 normal, no murmur, click, rub or gallop  Abdomen: soft, non-tender. Bowel sounds normal. No masses,  no organomegaly  Extremities: extremities normal, atraumatic, no cyanosis or edema        Data Review   Recent Results (from the past 24 hour(s))   METABOLIC PANEL, COMPREHENSIVE    Collection Time: 10/08/19  3:04 AM   Result Value Ref Range    Sodium 139 136 - 145 mmol/L    Potassium 4.1 3.5 - 5.1 mmol/L    Chloride 107 97 - 108 mmol/L    CO2 29 21 - 32 mmol/L    Anion gap 3 (L) 5 - 15 mmol/L    Glucose 99 65 - 100 mg/dL    BUN 6 6 - 20 MG/DL    Creatinine 0.75 0.55 - 1.02 MG/DL    BUN/Creatinine ratio 8 (L) 12 - 20      GFR est AA >60 >60 ml/min/1.73m2    GFR est non-AA >60 >60 ml/min/1.73m2    Calcium 8.3 (L) 8.5 - 10.1 MG/DL    Bilirubin, total 1.1 (H) 0.2 - 1.0 MG/DL    ALT (SGPT) 17 12 - 78 U/L    AST (SGOT) 32 15 - 37 U/L    Alk.  phosphatase 110 45 - 117 U/L    Protein, total 7.0 6.4 - 8.2 g/dL    Albumin 3.0 (L) 3.5 - 5.0 g/dL    Globulin 4.0 2.0 - 4.0 g/dL    A-G Ratio 0.8 (L) 1.1 - 2.2     CBC WITH MANUAL DIFF    Collection Time: 10/08/19  3:04 AM   Result Value Ref Range    WBC 7.2 3.6 - 11.0 K/uL    RBC 1.84 (L) 3.80 - 5.20 M/uL    HGB 6.3 (L) 11.5 - 16.0 g/dL    HCT 18.2 (L) 35.0 - 47.0 %    MCV 98.9 80.0 - 99.0 FL    MCH 34.2 (H) 26.0 - 34.0 PG    MCHC 34.6 30.0 - 36.5 g/dL    RDW 15.8 (H) 11.5 - 14.5 %    PLATELET 996 (L) 527 - 400 K/uL    MPV 11.4 8.9 - 12.9 FL    NRBC 1.2 (H) 0  WBC    ABSOLUTE NRBC 0.09 (H) 0.00 - 0.01 K/uL    NEUTROPHILS PENDING %    LYMPHOCYTES PENDING %    MONOCYTES PENDING %    EOSINOPHILS PENDING %    BASOPHILS PENDING %    IMMATURE GRANULOCYTES PENDING %    BAND NEUTROPHILS PENDING %    PROMYELOCYTES PENDING %    MYELOCYTES PENDING %    METAMYELOCYTES PENDING %    BLASTS PENDING %    OTHER CELL PENDING     ABS. NEUTROPHILS PENDING K/UL    ABS. LYMPHOCYTES PENDING K/UL    ABS. MONOCYTES PENDING K/UL    ABS. EOSINOPHILS PENDING K/UL    ABS. BASOPHILS PENDING K/UL    ABS. IMM. GRANS. PENDING K/UL    RBC COMMENTS PENDING     DF PENDING    RETICULOCYTE COUNT    Collection Time: 10/08/19  3:04 AM   Result Value Ref Range    Reticulocyte count 3.5 (H) 0.7 - 2.1 %    Absolute Retic Cnt. 0.0647 0.0164 - 0.0776 M/ul   Sara Rodgers    Collection Time: 10/08/19  3:04 AM   Result Value Ref Range    Vancomycin,trough 21.1 (HH) 5.0 - 10.0 ug/mL    Reported dose date: NOT PROVIDED      Reported dose time: NOT PROVIDED      Reported dose: NOT PROVIDED UNITS           Assessment:     Active Problems:    Sickle cell pain crisis ([facility] [address] (9/8/2018)        Plan:     1. Progressive decrease in hemoglobin. Patient has sickle hemoglobinopathy with persistent fetal hemoglobin. We will transfuse today. 2.  Continue hydration, analgesics, and will discontinue antibiotics for now.

## 2019-10-08 NOTE — PROGRESS NOTES
Bedside and Verbal shift change report given to Lore Souza (oncoming nurse) by Jordy Crandall (offgoing nurse). Report included the following information SBAR, Kardex, Intake/Output, MAR and Recent Results. Needs 1 more unit of blood.

## 2019-10-08 NOTE — PROGRESS NOTES
Bedside shift change report given to gadiel hanson RN (oncoming nurse) by Celso Vasquez rn (offgoing nurse). Report included the following information SBAR, Kardex, MAR and Recent Results.

## 2019-10-09 LAB
ABO + RH BLD: NORMAL
ALBUMIN SERPL-MCNC: 3.3 G/DL (ref 3.5–5)
ALBUMIN/GLOB SERPL: 0.8 {RATIO} (ref 1.1–2.2)
ALP SERPL-CCNC: 121 U/L (ref 45–117)
ALT SERPL-CCNC: 16 U/L (ref 12–78)
ANION GAP SERPL CALC-SCNC: 3 MMOL/L (ref 5–15)
ANTIGENS PRESENT RBC DONR: NORMAL
ANTIGENS PRESENT RBC DONR: NORMAL
AST SERPL-CCNC: 32 U/L (ref 15–37)
BACTERIA SPEC CULT: NORMAL
BASOPHILS # BLD: 0 K/UL (ref 0–0.1)
BASOPHILS NFR BLD: 0 % (ref 0–1)
BILIRUB SERPL-MCNC: 1.3 MG/DL (ref 0.2–1)
BLASTS NFR BLD MANUAL: 0 %
BLD PROD TYP BPU: NORMAL
BLD PROD TYP BPU: NORMAL
BLOOD GROUP ANTIBODIES SERPL: NORMAL
BPU ID: NORMAL
BPU ID: NORMAL
BUN SERPL-MCNC: 6 MG/DL (ref 6–20)
BUN/CREAT SERPL: 8 (ref 12–20)
CALCIUM SERPL-MCNC: 8.3 MG/DL (ref 8.5–10.1)
CHLORIDE SERPL-SCNC: 103 MMOL/L (ref 97–108)
CO2 SERPL-SCNC: 31 MMOL/L (ref 21–32)
CREAT SERPL-MCNC: 0.74 MG/DL (ref 0.55–1.02)
CROSSMATCH RESULT,%XM: NORMAL
CROSSMATCH RESULT,%XM: NORMAL
DEPRECATED HGB OTHER BLD-IMP: 0 %
DIFFERENTIAL METHOD BLD: ABNORMAL
EOSINOPHIL # BLD: 0.2 K/UL (ref 0–0.4)
EOSINOPHIL NFR BLD: 3 % (ref 0–7)
ERYTHROCYTE [DISTWIDTH] IN BLOOD BY AUTOMATED COUNT: 16.1 % (ref 11.5–14.5)
GLOBULIN SER CALC-MCNC: 4.3 G/DL (ref 2–4)
GLUCOSE SERPL-MCNC: 108 MG/DL (ref 65–100)
HCT VFR BLD AUTO: 25.7 % (ref 35–47)
HGB A MFR BLD: 0 % (ref 96.4–98.8)
HGB A2 MFR BLD COLUMN CHROM: 3 % (ref 1.8–3.2)
HGB BLD-MCNC: 9.1 G/DL (ref 11.5–16)
HGB C MFR BLD: 0 %
HGB F MFR BLD: 15.4 % (ref 0–2)
HGB FRACT BLD-IMP: ABNORMAL
HGB S BLD QL SOLY: POSITIVE
HGB S MFR BLD: 81.6 %
IMM GRANULOCYTES # BLD AUTO: 0 K/UL
IMM GRANULOCYTES NFR BLD AUTO: 0 %
LYMPHOCYTES # BLD: 5 K/UL (ref 0.8–3.5)
LYMPHOCYTES NFR BLD: 62 % (ref 12–49)
MCH RBC QN AUTO: 33.7 PG (ref 26–34)
MCHC RBC AUTO-ENTMCNC: 35.4 G/DL (ref 30–36.5)
MCV RBC AUTO: 95.2 FL (ref 80–99)
METAMYELOCYTES NFR BLD MANUAL: 0 %
MONOCYTES # BLD: 0.6 K/UL (ref 0–1)
MONOCYTES NFR BLD: 8 % (ref 5–13)
MYELOCYTES NFR BLD MANUAL: 0 %
NEUTS BAND NFR BLD MANUAL: 0 % (ref 0–6)
NEUTS SEG # BLD: 2.1 K/UL (ref 1.8–8)
NEUTS SEG NFR BLD: 27 % (ref 32–75)
NRBC # BLD: 0.06 K/UL (ref 0–0.01)
NRBC BLD-RTO: 0.8 PER 100 WBC
OTHER CELLS NFR BLD MANUAL: 0 %
PHYSICIAN INSTRUCTIO,%PI: NORMAL
PLATELET # BLD AUTO: 135 K/UL (ref 150–400)
PMV BLD AUTO: 11 FL (ref 8.9–12.9)
POTASSIUM SERPL-SCNC: 4.1 MMOL/L (ref 3.5–5.1)
PROMYELOCYTES NFR BLD MANUAL: 0 %
PROT SERPL-MCNC: 7.6 G/DL (ref 6.4–8.2)
RBC # BLD AUTO: 2.7 M/UL (ref 3.8–5.2)
RBC MORPH BLD: ABNORMAL
SERVICE CMNT-IMP: NORMAL
SODIUM SERPL-SCNC: 137 MMOL/L (ref 136–145)
SPECIMEN EXP DATE BLD: NORMAL
STATUS OF UNIT,%ST: NORMAL
STATUS OF UNIT,%ST: NORMAL
UNIT DIVISION, %UDIV: 0
UNIT DIVISION, %UDIV: 0
WBC # BLD AUTO: 7.9 K/UL (ref 3.6–11)

## 2019-10-09 PROCEDURE — 74011250636 HC RX REV CODE- 250/636: Performed by: INTERNAL MEDICINE

## 2019-10-09 PROCEDURE — 74011250637 HC RX REV CODE- 250/637: Performed by: INTERNAL MEDICINE

## 2019-10-09 PROCEDURE — 94760 N-INVAS EAR/PLS OXIMETRY 1: CPT

## 2019-10-09 PROCEDURE — 80053 COMPREHEN METABOLIC PANEL: CPT

## 2019-10-09 PROCEDURE — 85027 COMPLETE CBC AUTOMATED: CPT

## 2019-10-09 PROCEDURE — 65660000000 HC RM CCU STEPDOWN

## 2019-10-09 PROCEDURE — 77010033678 HC OXYGEN DAILY

## 2019-10-09 PROCEDURE — 36415 COLL VENOUS BLD VENIPUNCTURE: CPT

## 2019-10-09 RX ADMIN — DIPHENHYDRAMINE HYDROCHLORIDE 25 MG: 50 INJECTION, SOLUTION INTRAMUSCULAR; INTRAVENOUS at 08:07

## 2019-10-09 RX ADMIN — LOSARTAN POTASSIUM 50 MG: 50 TABLET, FILM COATED ORAL at 21:28

## 2019-10-09 RX ADMIN — Medication 10 ML: at 05:27

## 2019-10-09 RX ADMIN — DIPHENHYDRAMINE HYDROCHLORIDE 25 MG: 50 INJECTION, SOLUTION INTRAMUSCULAR; INTRAVENOUS at 01:14

## 2019-10-09 RX ADMIN — ONDANSETRON HYDROCHLORIDE 4 MG: 2 INJECTION, SOLUTION INTRAMUSCULAR; INTRAVENOUS at 14:39

## 2019-10-09 RX ADMIN — DIPHENHYDRAMINE HYDROCHLORIDE 25 MG: 50 INJECTION, SOLUTION INTRAMUSCULAR; INTRAVENOUS at 14:36

## 2019-10-09 RX ADMIN — CITALOPRAM HYDROBROMIDE 10 MG: 20 TABLET, FILM COATED ORAL at 08:05

## 2019-10-09 RX ADMIN — PANTOPRAZOLE SODIUM 40 MG: 40 TABLET, DELAYED RELEASE ORAL at 21:28

## 2019-10-09 RX ADMIN — DEXTROSE MONOHYDRATE, SODIUM CHLORIDE, AND POTASSIUM CHLORIDE: 50; 4.5; 2.24 INJECTION, SOLUTION INTRAVENOUS at 03:32

## 2019-10-09 RX ADMIN — HYDROMORPHONE HYDROCHLORIDE 1 MG: 1 INJECTION, SOLUTION INTRAMUSCULAR; INTRAVENOUS; SUBCUTANEOUS at 11:07

## 2019-10-09 RX ADMIN — DIPHENHYDRAMINE HYDROCHLORIDE 25 MG: 50 INJECTION, SOLUTION INTRAMUSCULAR; INTRAVENOUS at 20:35

## 2019-10-09 RX ADMIN — DEXTROSE MONOHYDRATE, SODIUM CHLORIDE, AND POTASSIUM CHLORIDE: 50; 4.5; 2.24 INJECTION, SOLUTION INTRAVENOUS at 14:35

## 2019-10-09 RX ADMIN — ONDANSETRON HYDROCHLORIDE 4 MG: 2 INJECTION, SOLUTION INTRAMUSCULAR; INTRAVENOUS at 08:08

## 2019-10-09 RX ADMIN — POLYETHYLENE GLYCOL 3350 17 G: 17 POWDER, FOR SOLUTION ORAL at 08:07

## 2019-10-09 RX ADMIN — Medication 10 ML: at 14:40

## 2019-10-09 RX ADMIN — Medication 10 ML: at 21:29

## 2019-10-09 RX ADMIN — HYDROMORPHONE HYDROCHLORIDE 1 MG: 1 INJECTION, SOLUTION INTRAMUSCULAR; INTRAVENOUS; SUBCUTANEOUS at 01:14

## 2019-10-09 RX ADMIN — HYDROMORPHONE HYDROCHLORIDE 1 MG: 1 INJECTION, SOLUTION INTRAMUSCULAR; INTRAVENOUS; SUBCUTANEOUS at 14:33

## 2019-10-09 RX ADMIN — HYDROXYUREA 500 MG: 500 CAPSULE ORAL at 08:05

## 2019-10-09 RX ADMIN — HYDROMORPHONE HYDROCHLORIDE 1 MG: 1 INJECTION, SOLUTION INTRAMUSCULAR; INTRAVENOUS; SUBCUTANEOUS at 20:35

## 2019-10-09 RX ADMIN — HYDROMORPHONE HYDROCHLORIDE 1 MG: 1 INJECTION, SOLUTION INTRAMUSCULAR; INTRAVENOUS; SUBCUTANEOUS at 08:02

## 2019-10-09 RX ADMIN — FOLIC ACID 1 MG: 1 TABLET ORAL at 21:28

## 2019-10-09 RX ADMIN — HYDROMORPHONE HYDROCHLORIDE 1 MG: 1 INJECTION, SOLUTION INTRAMUSCULAR; INTRAVENOUS; SUBCUTANEOUS at 17:38

## 2019-10-09 RX ADMIN — Medication 1 CAPSULE: at 08:05

## 2019-10-09 RX ADMIN — HYDROMORPHONE HYDROCHLORIDE 1 MG: 1 INJECTION, SOLUTION INTRAMUSCULAR; INTRAVENOUS; SUBCUTANEOUS at 04:37

## 2019-10-09 RX ADMIN — ENOXAPARIN SODIUM 40 MG: 40 INJECTION, SOLUTION INTRAVENOUS; SUBCUTANEOUS at 04:40

## 2019-10-09 RX ADMIN — ONDANSETRON HYDROCHLORIDE 4 MG: 2 INJECTION, SOLUTION INTRAMUSCULAR; INTRAVENOUS at 04:37

## 2019-10-09 RX ADMIN — ONDANSETRON HYDROCHLORIDE 4 MG: 2 INJECTION, SOLUTION INTRAMUSCULAR; INTRAVENOUS at 20:35

## 2019-10-09 NOTE — PROGRESS NOTES
0700: Bedside shift change report given to SAMIR Masterson (oncoming nurse) by Ewelina Waite (offgoing nurse). Report included the following information SBAR, Kardex, Procedure Summary, Intake/Output, MAR and Recent Results.

## 2019-10-09 NOTE — PROGRESS NOTES
Bedside and Verbal shift change report given to Bhumi (oncoming nurse) by Eveline Jimenez (offgoing nurse). Report included the following information SBAR, Kardex, Intake/Output, MAR and Recent Results.

## 2019-10-09 NOTE — PROGRESS NOTES
General Daily Progress Note    Admit Date: 10/3/2019    Subjective:     Patient continues to complain of lumbar pain    Current Facility-Administered Medications   Medication Dose Route Frequency    lactobac ac& pc-s.therm-b.anim (CARLITOS Q/RISAQUAD)  1 Cap Oral DAILY    0.9% sodium chloride infusion 250 mL  250 mL IntraVENous PRN    polyethylene glycol (MIRALAX) packet 17 g  17 g Oral DAILY    fentaNYL (DURAGESIC) 75 mcg/hr patch 1 Patch  1 Patch TransDERmal Q72H    dextrose 5% - 0.45% NaCl with KCl 30 mEq/L infusion   IntraVENous CONTINUOUS    sodium chloride (NS) flush 5-40 mL  5-40 mL IntraVENous Q8H    sodium chloride (NS) flush 5-40 mL  5-40 mL IntraVENous PRN    naloxone (NARCAN) injection 0.4 mg  0.4 mg IntraVENous PRN    ondansetron (ZOFRAN) injection 4 mg  4 mg IntraVENous Q4H PRN    bisacodyl (DULCOLAX) tablet 5 mg  5 mg Oral DAILY PRN    enoxaparin (LOVENOX) injection 40 mg  40 mg SubCUTAneous Q24H    diphenhydrAMINE (BENADRYL) injection 25 mg  25 mg IntraVENous Q6H PRN    hydroxyurea (HYDREA) chemo cap 500 mg  500 mg Oral DAILY    folic acid (FOLVITE) tablet 1 mg  1 mg Oral QHS    pantoprazole (PROTONIX) tablet 40 mg  40 mg Oral QHS    citalopram (CELEXA) tablet 10 mg  10 mg Oral DAILY    losartan (COZAAR) tablet 50 mg  50 mg Oral QHS    HYDROmorphone (PF) (DILAUDID) injection 1 mg  1 mg IntraVENous Q3H PRN    acetaminophen (TYLENOL) tablet 650 mg  650 mg Oral Q4H PRN        Review of Systems  A comprehensive review of systems was negative.     Objective:     Patient Vitals for the past 24 hrs:   BP Temp Pulse Resp SpO2   10/09/19 0810 109/75 98.8 °F (37.1 °C) 78 18 93 %   10/09/19 0337 117/80 99.1 °F (37.3 °C) 86 16 92 %   10/08/19 2344 135/77 98 °F (36.7 °C) 83 18 93 %   10/08/19 2149 134/75  85 16 (!) 84 %   10/08/19 2112 138/82 99.5 °F (37.5 °C) 82 18 97 %   10/08/19 2015 115/62 99 °F (37.2 °C) 83 16 94 %   10/08/19 1947 126/72 99.1 °F (37.3 °C) 80 16 98 %   10/08/19 1914 133/79 99 °F (37.2 °C) 82 16 97 %   10/08/19 1859 135/77 99.1 °F (37.3 °C) 84 16 95 %   10/08/19 1842 127/75 99 °F (37.2 °C) 80 18 100 %   10/08/19 1732 121/90 99.2 °F (37.3 °C) 83 18 96 %   10/08/19 1636 123/84  76 12 94 %   10/08/19 1549 114/71 98.9 °F (37.2 °C) 76 18 97 %   10/08/19 1505 140/78 99.2 °F (37.3 °C) 82 18 96 %   10/08/19 1449 140/78 99.4 °F (37.4 °C) 89 18 98 %   10/08/19 1427 127/71 99 °F (37.2 °C) 86 18 90 %   10/08/19 1125 116/67 99.1 °F (37.3 °C) 83 18 94 %     10/09 0701 - 10/09 1900  In: 120 [P.O.:120]  Out: -   10/07 1901 - 10/09 0700  In: 3086.7 [P.O.:480; I.V.:2000]  Out: -     Physical Exam:   Visit Vitals  /75 (BP 1 Location: Left arm, BP Patient Position: At rest)   Pulse 78   Temp 98.8 °F (37.1 °C)   Resp 18   Ht 5' 11\" (1.803 m)   Wt 206 lb 2.1 oz (93.5 kg)   SpO2 93%   BMI 28.75 kg/m²     General appearance: alert, cooperative, no distress, appears stated age  Neck: supple, symmetrical, trachea midline, no adenopathy, thyroid: not enlarged, symmetric, no tenderness/mass/nodules, no carotid bruit and no JVD  Lungs: clear to auscultation bilaterally  Heart: regular rate and rhythm, S1, S2 normal, no murmur, click, rub or gallop  Abdomen: soft, non-tender.  Bowel sounds normal. No masses,  no organomegaly  Extremities: extremities normal, atraumatic, no cyanosis or edema        Data Review   Recent Results (from the past 24 hour(s))   TYPE + CROSSMATCH    Collection Time: 10/08/19  9:41 AM   Result Value Ref Range    Crossmatch Expiration 10/11/2019     ABO/Rh(D) A NEGATIVE     Antibody screen NEG     Physician instructions [address] [facility]     Unit number [id number]     Blood component type  LR     Unit division 00     Status of unit TRANSFUSED     ANTIGEN/ANTIBODY INFO C NEGATIVE,  E NEGATIVE,  LINDA NEGATIVE,       Crossmatch result Compatible     Unit number [id number]     Blood component type  LR,1     Unit division 00     Status of unit TRANSFUSED     ANTIGEN/ANTIBODY INFO C NEGATIVE,  E NEGATIVE,  LINDA NEGATIVE,       Crossmatch result Compatible    METABOLIC PANEL, COMPREHENSIVE    Collection Time: 10/09/19  3:39 AM   Result Value Ref Range    Sodium 137 136 - 145 mmol/L    Potassium 4.1 3.5 - 5.1 mmol/L    Chloride 103 97 - 108 mmol/L    CO2 31 21 - 32 mmol/L    Anion gap 3 (L) 5 - 15 mmol/L    Glucose 108 (H) 65 - 100 mg/dL    BUN 6 6 - 20 MG/DL    Creatinine 0.74 0.55 - 1.02 MG/DL    BUN/Creatinine ratio 8 (L) 12 - 20      GFR est AA >60 >60 ml/min/1.73m2    GFR est non-AA >60 >60 ml/min/1.73m2    Calcium 8.3 (L) 8.5 - 10.1 MG/DL    Bilirubin, total 1.3 (H) 0.2 - 1.0 MG/DL    ALT (SGPT) 16 12 - 78 U/L    AST (SGOT) 32 15 - 37 U/L    Alk. phosphatase 121 (H) 45 - 117 U/L    Protein, total 7.6 6.4 - 8.2 g/dL    Albumin 3.3 (L) 3.5 - 5.0 g/dL    Globulin 4.3 (H) 2.0 - 4.0 g/dL    A-G Ratio 0.8 (L) 1.1 - 2.2     CBC WITH MANUAL DIFF    Collection Time: 10/09/19  3:39 AM   Result Value Ref Range    WBC 7.9 3.6 - 11.0 K/uL    RBC 2.70 (L) 3.80 - 5.20 M/uL    HGB 9.1 (L) 11.5 - 16.0 g/dL    HCT 25.7 (L) 35.0 - 47.0 %    MCV 95.2 80.0 - 99.0 FL    MCH 33.7 26.0 - 34.0 PG    MCHC 35.4 30.0 - 36.5 g/dL    RDW 16.1 (H) 11.5 - 14.5 %    PLATELET 986 (L) 828 - 400 K/uL    MPV 11.0 8.9 - 12.9 FL    NRBC 0.8 (H) 0  WBC    ABSOLUTE NRBC 0.06 (H) 0.00 - 0.01 K/uL    NEUTROPHILS 27 (L) 32 - 75 %    BAND NEUTROPHILS 0 0 - 6 %    LYMPHOCYTES 62 (H) 12 - 49 %    MONOCYTES 8 5 - 13 %    EOSINOPHILS 3 0 - 7 %    BASOPHILS 0 0 - 1 %    METAMYELOCYTES 0 0 %    MYELOCYTES 0 0 %    PROMYELOCYTES 0 0 %    BLASTS 0 0 %    OTHER CELL 0 0      IMMATURE GRANULOCYTES 0 %    ABS. NEUTROPHILS 2.1 1.8 - 8.0 K/UL    ABS. LYMPHOCYTES 5.0 (H) 0.8 - 3.5 K/UL    ABS. MONOCYTES 0.6 0.0 - 1.0 K/UL    ABS. EOSINOPHILS 0.2 0.0 - 0.4 K/UL    ABS. BASOPHILS 0.0 0.0 - 0.1 K/UL    ABS. IMM.  GRANS. 0.0 K/UL    DF MANUAL      RBC COMMENTS OVALOCYTES  1+        RBC COMMENTS TARGET CELLS  1+        RBC COMMENTS STOMATOCYTES  1+        RBC COMMENTS ANISOCYTOSIS  1+               Assessment:     Active Problems:    Sickle cell pain crisis ([facility] [address] (9/8/2018)        Plan:     1. H/h stable this am -continue to monitor  2. Continue hydration, analgesics, and will discontinue antibiotics for now.

## 2019-10-09 NOTE — PROGRESS NOTES
Bedside shift change report given to [facility] (oncoming nurse) by Brady Goff RN (offgoing nurse). Report included the following information SBAR, Kardex, Intake/Output, MAR and Recent Results.

## 2019-10-10 LAB
ALBUMIN SERPL-MCNC: 3.2 G/DL (ref 3.5–5)
ALBUMIN/GLOB SERPL: 0.7 {RATIO} (ref 1.1–2.2)
ALP SERPL-CCNC: 118 U/L (ref 45–117)
ALT SERPL-CCNC: 16 U/L (ref 12–78)
ANION GAP SERPL CALC-SCNC: ABNORMAL MMOL/L (ref 5–15)
AST SERPL-CCNC: 31 U/L (ref 15–37)
BASOPHILS # BLD: 0 K/UL (ref 0–0.1)
BASOPHILS NFR BLD: 0 % (ref 0–1)
BILIRUB SERPL-MCNC: 1.7 MG/DL (ref 0.2–1)
BLASTS NFR BLD MANUAL: 0 %
BUN SERPL-MCNC: 8 MG/DL (ref 6–20)
BUN/CREAT SERPL: 11 (ref 12–20)
CALCIUM SERPL-MCNC: 8.5 MG/DL (ref 8.5–10.1)
CHLORIDE SERPL-SCNC: 103 MMOL/L (ref 97–108)
CO2 SERPL-SCNC: 34 MMOL/L (ref 21–32)
CREAT SERPL-MCNC: 0.75 MG/DL (ref 0.55–1.02)
DIFFERENTIAL METHOD BLD: ABNORMAL
EOSINOPHIL # BLD: 0.4 K/UL (ref 0–0.4)
EOSINOPHIL NFR BLD: 6 % (ref 0–7)
ERYTHROCYTE [DISTWIDTH] IN BLOOD BY AUTOMATED COUNT: 15.7 % (ref 11.5–14.5)
GLOBULIN SER CALC-MCNC: 4.7 G/DL (ref 2–4)
GLUCOSE SERPL-MCNC: 85 MG/DL (ref 65–100)
HCT VFR BLD AUTO: 25.7 % (ref 35–47)
HGB BLD-MCNC: 8.7 G/DL (ref 11.5–16)
IMM GRANULOCYTES # BLD AUTO: 0 K/UL
IMM GRANULOCYTES NFR BLD AUTO: 0 %
LYMPHOCYTES # BLD: 3 K/UL (ref 0.8–3.5)
LYMPHOCYTES NFR BLD: 41 % (ref 12–49)
MCH RBC QN AUTO: 32.8 PG (ref 26–34)
MCHC RBC AUTO-ENTMCNC: 33.9 G/DL (ref 30–36.5)
MCV RBC AUTO: 97 FL (ref 80–99)
METAMYELOCYTES NFR BLD MANUAL: 0 %
MONOCYTES # BLD: 0.6 K/UL (ref 0–1)
MONOCYTES NFR BLD: 8 % (ref 5–13)
MYELOCYTES NFR BLD MANUAL: 0 %
NEUTS BAND NFR BLD MANUAL: 0 % (ref 0–6)
NEUTS SEG # BLD: 3.4 K/UL (ref 1.8–8)
NEUTS SEG NFR BLD: 45 % (ref 32–75)
NRBC # BLD: 0.07 K/UL (ref 0–0.01)
NRBC BLD-RTO: 0.9 PER 100 WBC
OTHER CELLS NFR BLD MANUAL: 0 %
PLATELET # BLD AUTO: 147 K/UL (ref 150–400)
PMV BLD AUTO: 11.4 FL (ref 8.9–12.9)
POTASSIUM SERPL-SCNC: 4.1 MMOL/L (ref 3.5–5.1)
PROMYELOCYTES NFR BLD MANUAL: 0 %
PROT SERPL-MCNC: 7.9 G/DL (ref 6.4–8.2)
RBC # BLD AUTO: 2.65 M/UL (ref 3.8–5.2)
RBC MORPH BLD: ABNORMAL
RETICS # AUTO: 0.1 M/UL (ref 0.02–0.08)
RETICS/RBC NFR AUTO: 4.1 % (ref 0.7–2.1)
SODIUM SERPL-SCNC: 136 MMOL/L (ref 136–145)
WBC # BLD AUTO: 7.4 K/UL (ref 3.6–11)

## 2019-10-10 PROCEDURE — 94760 N-INVAS EAR/PLS OXIMETRY 1: CPT

## 2019-10-10 PROCEDURE — 74011250637 HC RX REV CODE- 250/637: Performed by: INTERNAL MEDICINE

## 2019-10-10 PROCEDURE — 77010033678 HC OXYGEN DAILY

## 2019-10-10 PROCEDURE — 85027 COMPLETE CBC AUTOMATED: CPT

## 2019-10-10 PROCEDURE — 74011250636 HC RX REV CODE- 250/636: Performed by: INTERNAL MEDICINE

## 2019-10-10 PROCEDURE — 80053 COMPREHEN METABOLIC PANEL: CPT

## 2019-10-10 PROCEDURE — 65660000000 HC RM CCU STEPDOWN

## 2019-10-10 PROCEDURE — 36415 COLL VENOUS BLD VENIPUNCTURE: CPT

## 2019-10-10 PROCEDURE — 85045 AUTOMATED RETICULOCYTE COUNT: CPT

## 2019-10-10 RX ADMIN — DEXTROSE MONOHYDRATE, SODIUM CHLORIDE, AND POTASSIUM CHLORIDE: 50; 4.5; 2.24 INJECTION, SOLUTION INTRAVENOUS at 00:33

## 2019-10-10 RX ADMIN — DIPHENHYDRAMINE HYDROCHLORIDE 25 MG: 50 INJECTION, SOLUTION INTRAMUSCULAR; INTRAVENOUS at 04:26

## 2019-10-10 RX ADMIN — HYDROXYUREA 500 MG: 500 CAPSULE ORAL at 08:34

## 2019-10-10 RX ADMIN — ONDANSETRON HYDROCHLORIDE 4 MG: 2 INJECTION, SOLUTION INTRAMUSCULAR; INTRAVENOUS at 20:04

## 2019-10-10 RX ADMIN — HYDROMORPHONE HYDROCHLORIDE 1 MG: 1 INJECTION, SOLUTION INTRAMUSCULAR; INTRAVENOUS; SUBCUTANEOUS at 18:51

## 2019-10-10 RX ADMIN — Medication 10 ML: at 06:13

## 2019-10-10 RX ADMIN — LOSARTAN POTASSIUM 50 MG: 50 TABLET, FILM COATED ORAL at 21:08

## 2019-10-10 RX ADMIN — HYDROMORPHONE HYDROCHLORIDE 1 MG: 1 INJECTION, SOLUTION INTRAMUSCULAR; INTRAVENOUS; SUBCUTANEOUS at 08:33

## 2019-10-10 RX ADMIN — HYDROMORPHONE HYDROCHLORIDE 1 MG: 1 INJECTION, SOLUTION INTRAMUSCULAR; INTRAVENOUS; SUBCUTANEOUS at 21:58

## 2019-10-10 RX ADMIN — HYDROMORPHONE HYDROCHLORIDE 1 MG: 1 INJECTION, SOLUTION INTRAMUSCULAR; INTRAVENOUS; SUBCUTANEOUS at 15:27

## 2019-10-10 RX ADMIN — ONDANSETRON HYDROCHLORIDE 4 MG: 2 INJECTION, SOLUTION INTRAMUSCULAR; INTRAVENOUS at 08:33

## 2019-10-10 RX ADMIN — DIPHENHYDRAMINE HYDROCHLORIDE 25 MG: 50 INJECTION, SOLUTION INTRAMUSCULAR; INTRAVENOUS at 18:51

## 2019-10-10 RX ADMIN — Medication 1 CAPSULE: at 08:33

## 2019-10-10 RX ADMIN — DIPHENHYDRAMINE HYDROCHLORIDE 25 MG: 50 INJECTION, SOLUTION INTRAMUSCULAR; INTRAVENOUS at 11:43

## 2019-10-10 RX ADMIN — HYDROMORPHONE HYDROCHLORIDE 1 MG: 1 INJECTION, SOLUTION INTRAMUSCULAR; INTRAVENOUS; SUBCUTANEOUS at 00:31

## 2019-10-10 RX ADMIN — DEXTROSE MONOHYDRATE, SODIUM CHLORIDE, AND POTASSIUM CHLORIDE: 50; 4.5; 2.24 INJECTION, SOLUTION INTRAVENOUS at 11:43

## 2019-10-10 RX ADMIN — ONDANSETRON HYDROCHLORIDE 4 MG: 2 INJECTION, SOLUTION INTRAMUSCULAR; INTRAVENOUS at 00:30

## 2019-10-10 RX ADMIN — DEXTROSE MONOHYDRATE, SODIUM CHLORIDE, AND POTASSIUM CHLORIDE: 50; 4.5; 2.24 INJECTION, SOLUTION INTRAVENOUS at 22:14

## 2019-10-10 RX ADMIN — ONDANSETRON HYDROCHLORIDE 4 MG: 2 INJECTION, SOLUTION INTRAMUSCULAR; INTRAVENOUS at 15:27

## 2019-10-10 RX ADMIN — Medication 10 ML: at 21:06

## 2019-10-10 RX ADMIN — HYDROMORPHONE HYDROCHLORIDE 1 MG: 1 INJECTION, SOLUTION INTRAMUSCULAR; INTRAVENOUS; SUBCUTANEOUS at 04:26

## 2019-10-10 RX ADMIN — Medication 10 ML: at 15:29

## 2019-10-10 RX ADMIN — ENOXAPARIN SODIUM 40 MG: 40 INJECTION, SOLUTION INTRAVENOUS; SUBCUTANEOUS at 04:26

## 2019-10-10 RX ADMIN — HYDROMORPHONE HYDROCHLORIDE 1 MG: 1 INJECTION, SOLUTION INTRAMUSCULAR; INTRAVENOUS; SUBCUTANEOUS at 11:43

## 2019-10-10 RX ADMIN — FOLIC ACID 1 MG: 1 TABLET ORAL at 21:08

## 2019-10-10 RX ADMIN — ONDANSETRON HYDROCHLORIDE 4 MG: 2 INJECTION, SOLUTION INTRAMUSCULAR; INTRAVENOUS at 04:26

## 2019-10-10 RX ADMIN — PANTOPRAZOLE SODIUM 40 MG: 40 TABLET, DELAYED RELEASE ORAL at 21:08

## 2019-10-10 RX ADMIN — CITALOPRAM HYDROBROMIDE 10 MG: 20 TABLET, FILM COATED ORAL at 08:33

## 2019-10-10 RX ADMIN — POLYETHYLENE GLYCOL 3350 17 G: 17 POWDER, FOR SOLUTION ORAL at 08:33

## 2019-10-10 NOTE — PROGRESS NOTES
Problem: Falls - Risk of  Goal: *Absence of Falls  Description  Document Josseline Barraza Fall Risk and appropriate interventions in the flowsheet.   Outcome: Progressing Towards Goal  Note:   Fall Risk Interventions:       Mentation Interventions: Adequate sleep, hydration, pain control    Medication Interventions: Patient to call before getting OOB, Teach patient to arise slowly    Elimination Interventions: Call light in reach, Patient to call for help with toileting needs    History of Falls Interventions: Room close to nurse's station

## 2019-10-10 NOTE — PROGRESS NOTES
Initial Nutrition Assessment:    INTERVENTIONS/RECOMMENDATIONS:   · Meals/Snacks: General/healthful diet: Continue Regular diet    ASSESSMENT:   Patient medically noted for sickle cell pain crisis. PMH HTN and depression. Chart reviewed for length of stay. Excellent PO noted per flowsheets. Patient states she has a good appetite, eating well, and meals have been good. No nutrition questions or concerns from patient. Diet Order: Regular  % Eaten:    Patient Vitals for the past 72 hrs:   % Diet Eaten   10/09/19 1737 80 %   10/09/19 1132 100 %   10/09/19 0814 100 %   10/08/19 1825 50 %   10/08/19 0818 100 %       Pertinent Medications: [x]Reviewed []Other: Celexa, Folic acid, Anya Q, Protonix, Miralax  Pertinent Labs: [x]Reviewed []Other:   Food Allergies: [x]None []Yes:    Last BM:  10/2   []Active     []Hyperactive  []Hypoactive       [] Absent BS  Skin:    [x] Intact   [] Incision  [] Breakdown: [] Edema []Other:    Anthropometrics:   Height: 5' 11\" (180.3 cm) Weight: 93.5 kg (206 lb 2.1 oz)   IBW (%IBW):   ( ) UBW (%UBW):   (  %)   Last Weight Metrics:  Weight Loss Metrics 10/3/2019 9/26/2019 9/6/2019 8/12/2019 7/27/2019 7/26/2019 6/25/2019   Today's Wt 206 lb 2.1 oz 206 lb 11.2 oz 202 lb 13.2 oz 209 lb 6.4 oz 197 lb 1.5 oz 199 lb 12.8 oz 197 lb 14.4 oz   BMI 28.75 kg/m2 29.66 kg/m2 29.1 kg/m2 30.05 kg/m2 28.28 kg/m2 28.67 kg/m2 28.4 kg/m2       BMI: Body mass index is 28.75 kg/m². This BMI is indicative of:   []Underweight    []Normal    [x]Overweight    [] Obesity   [] Extreme Obesity (BMI>40)     Estimated Nutrition Needs (Based on):   2217 Kcals/day(BMR (1621) x 1. 3AF -300kcal) , 75 g(0.8 g/kg bw) Protein  Carbohydrate:  At Least 130 g/day  Fluids: 1800 mL/day (1ml/kcal)    Pt expected to meet estimated nutrient needs: [x]Yes []No    NUTRITION DIAGNOSES:   Problem:  No nutritional diagnosis at this time      Etiology: related to       Signs/Symptoms: as evidenced by        NUTRITION INTERVENTIONS:  Meals/Snacks: General/healthful diet                  GOAL:   PO intake >75% of meals next 5-7 days    LEARNING NEEDS (Diet, Food/Nutrient-Drug Interaction):    [x] None Identified   [] Identified and Education Provided/Documented   [] Identified and Pt declined/was not appropriate     Cultural, Episcopalian, OR Ethnic Dietary Needs:    [x] None Identified   [] Identified and Addressed     [x] Interdisciplinary Care Plan Reviewed/Documented    [x] Discharge Planning: Regular diet      MONITORING /EVALUATION:   Food/Nutrient Intake Outcomes:  Total energy intake  Physical Signs/Symptoms Outcomes: Weight/weight change    NUTRITION RISK:    [] Patient At Nutritional Risk              [x] Patient Not at Nutritional Risk    PT SEEN FOR:    []  MD Consult: []Calorie Count      []Diabetic Diet Education        []Diet Education     []Electrolyte Management     []General Nutrition Management and Supplements     []Management of Tube Feeding     []TPN Recommendations    []  RN Referral:  []MST score >=2     []Enteral/Parenteral Nutrition PTA     []Pregnant: Gestational DM or Multigestation     []Pressure Ulcer/Wound Care needs        []  Low BMI  [x]  KIRILL Kendall 9212  Pager [phone]    Weekend Pager [phone]

## 2019-10-10 NOTE — PROGRESS NOTES
Bedside and Verbal shift change report given to Tiffany Strauss RN (oncoming nurse) by Mary Nichols (offgoing nurse). Report given with SBAR, Kardex, Intake/Output, MAR and Recent Results.

## 2019-10-10 NOTE — PROGRESS NOTES
Bedside and Verbal shift change report given to [facility] (oncoming nurse) by Eveline De La Cruz (offgoing nurse). Report included the following information Kardex, MAR and Recent Results.

## 2019-10-10 NOTE — PROGRESS NOTES
General Daily Progress Note    Admit Date: 10/3/2019    Subjective:     Patient continues to complain of lumbar pain, but better    Current Facility-Administered Medications   Medication Dose Route Frequency    lactobac ac& pc-s.therm-b.anim (CARLITOS Q/RISAQUAD)  1 Cap Oral DAILY    0.9% sodium chloride infusion 250 mL  250 mL IntraVENous PRN    polyethylene glycol (MIRALAX) packet 17 g  17 g Oral DAILY    fentaNYL (DURAGESIC) 75 mcg/hr patch 1 Patch  1 Patch TransDERmal Q72H    dextrose 5% - 0.45% NaCl with KCl 30 mEq/L infusion   IntraVENous CONTINUOUS    sodium chloride (NS) flush 5-40 mL  5-40 mL IntraVENous Q8H    sodium chloride (NS) flush 5-40 mL  5-40 mL IntraVENous PRN    naloxone (NARCAN) injection 0.4 mg  0.4 mg IntraVENous PRN    ondansetron (ZOFRAN) injection 4 mg  4 mg IntraVENous Q4H PRN    bisacodyl (DULCOLAX) tablet 5 mg  5 mg Oral DAILY PRN    enoxaparin (LOVENOX) injection 40 mg  40 mg SubCUTAneous Q24H    diphenhydrAMINE (BENADRYL) injection 25 mg  25 mg IntraVENous Q6H PRN    hydroxyurea (HYDREA) chemo cap 500 mg  500 mg Oral DAILY    folic acid (FOLVITE) tablet 1 mg  1 mg Oral QHS    pantoprazole (PROTONIX) tablet 40 mg  40 mg Oral QHS    citalopram (CELEXA) tablet 10 mg  10 mg Oral DAILY    losartan (COZAAR) tablet 50 mg  50 mg Oral QHS    HYDROmorphone (PF) (DILAUDID) injection 1 mg  1 mg IntraVENous Q3H PRN    acetaminophen (TYLENOL) tablet 650 mg  650 mg Oral Q4H PRN        Review of Systems  A comprehensive review of systems was negative.     Objective:     Patient Vitals for the past 24 hrs:   BP Temp Pulse Resp SpO2   10/10/19 0402 119/71 98.8 °F (37.1 °C) 77 18 99 %   10/09/19 2230 132/72 98.2 °F (36.8 °C) 85 20 100 %   10/09/19 1911 128/62 98.9 °F (37.2 °C) 79 18 96 %   10/09/19 1738   78 18 100 %   10/09/19 1538 128/89 99 °F (37.2 °C) 80 18 94 %   10/09/19 1118 128/74 98.8 °F (37.1 °C) 79 18 94 %   10/09/19 0810 109/75 98.8 °F (37.1 °C) 78 18 93 %     10/09 1901 - 10/10 0700  In: 885 [I.V.:885]  Out: -   10/08 0701 - 10/09 1900  In: 1812 [P.O.:840; I.V.:788.3]  Out: -     Physical Exam:   Visit Vitals  /71   Pulse 77   Temp 98.8 °F (37.1 °C)   Resp 18   Ht 5' 11\" (1.803 m)   Wt 206 lb 2.1 oz (93.5 kg)   SpO2 99%   BMI 28.75 kg/m²     General appearance: alert, cooperative, no distress, appears stated age  Neck: supple, symmetrical, trachea midline, no adenopathy, thyroid: not enlarged, symmetric, no tenderness/mass/nodules, no carotid bruit and no JVD  Lungs: clear to auscultation bilaterally  Heart: regular rate and rhythm, S1, S2 normal, no murmur, click, rub or gallop  Abdomen: soft, non-tender. Bowel sounds normal. No masses,  no organomegaly  Extremities: extremities normal, atraumatic, no cyanosis or edema        Data Review   Recent Results (from the past 24 hour(s))   METABOLIC PANEL, COMPREHENSIVE    Collection Time: 10/10/19  4:04 AM   Result Value Ref Range    Sodium 136 136 - 145 mmol/L    Potassium 4.1 3.5 - 5.1 mmol/L    Chloride 103 97 - 108 mmol/L    CO2 34 (H) 21 - 32 mmol/L    Anion gap NEG 1 5 - 15 mmol/L    Glucose 85 65 - 100 mg/dL    BUN 8 6 - 20 MG/DL    Creatinine 0.75 0.55 - 1.02 MG/DL    BUN/Creatinine ratio 11 (L) 12 - 20      GFR est AA >60 >60 ml/min/1.73m2    GFR est non-AA >60 >60 ml/min/1.73m2    Calcium 8.5 8.5 - 10.1 MG/DL    Bilirubin, total 1.7 (H) 0.2 - 1.0 MG/DL    ALT (SGPT) 16 12 - 78 U/L    AST (SGOT) 31 15 - 37 U/L    Alk.  phosphatase 118 (H) 45 - 117 U/L    Protein, total 7.9 6.4 - 8.2 g/dL    Albumin 3.2 (L) 3.5 - 5.0 g/dL    Globulin 4.7 (H) 2.0 - 4.0 g/dL    A-G Ratio 0.7 (L) 1.1 - 2.2     CBC WITH MANUAL DIFF    Collection Time: 10/10/19  4:04 AM   Result Value Ref Range    WBC 7.4 3.6 - 11.0 K/uL    RBC 2.65 (L) 3.80 - 5.20 M/uL    HGB 8.7 (L) 11.5 - 16.0 g/dL    HCT 25.7 (L) 35.0 - 47.0 %    MCV 97.0 80.0 - 99.0 FL    MCH 32.8 26.0 - 34.0 PG    MCHC 33.9 30.0 - 36.5 g/dL    RDW 15.7 (H) 11.5 - 14.5 %    PLATELET 882 (L) 150 - 400 K/uL    MPV 11.4 8.9 - 12.9 FL    NRBC 0.9 (H) 0  WBC    ABSOLUTE NRBC 0.07 (H) 0.00 - 0.01 K/uL    NEUTROPHILS 45 32 - 75 %    BAND NEUTROPHILS 0 0 - 6 %    LYMPHOCYTES 41 12 - 49 %    MONOCYTES 8 5 - 13 %    EOSINOPHILS 6 0 - 7 %    BASOPHILS 0 0 - 1 %    METAMYELOCYTES 0 0 %    MYELOCYTES 0 0 %    PROMYELOCYTES 0 0 %    BLASTS 0 0 %    OTHER CELL 0 0      IMMATURE GRANULOCYTES 0 %    ABS. NEUTROPHILS 3.4 1.8 - 8.0 K/UL    ABS. LYMPHOCYTES 3.0 0.8 - 3.5 K/UL    ABS. MONOCYTES 0.6 0.0 - 1.0 K/UL    ABS. EOSINOPHILS 0.4 0.0 - 0.4 K/UL    ABS. BASOPHILS 0.0 0.0 - 0.1 K/UL    ABS. IMM. GRANS. 0.0 K/UL    DF MANUAL      RBC COMMENTS ANISOCYTOSIS  1+        RBC COMMENTS TARGET CELLS  1+        RBC COMMENTS SICKLE CELLS  1+       RETICULOCYTE COUNT    Collection Time: 10/10/19  4:04 AM   Result Value Ref Range    Reticulocyte count 4.1 (H) 0.7 - 2.1 %    Absolute Retic Cnt. 0.1042 (H) 0.0164 - 0.0776 M/ul           Assessment:     Active Problems:    Sickle cell pain crisis ([facility] [address] (9/8/2018)        Plan:     1. H/h stable this am -continue to monitor  2. Continue hydration, analgesics, and will discontinue antibiotics for now.

## 2019-10-10 NOTE — PROGRESS NOTES
KRISTINA: home with family support  1) family to transport patient at d/c  2) patient will need PCP follow up   3) patient will need 2nd IM letter prior to d/c  4.   Anticipate d/c in 1-2 days if stable.

## 2019-10-11 LAB
BASOPHILS # BLD: 0.1 K/UL (ref 0–0.1)
BASOPHILS NFR BLD: 2 % (ref 0–1)
BLASTS NFR BLD MANUAL: 0 %
DIFFERENTIAL METHOD BLD: ABNORMAL
EOSINOPHIL # BLD: 0.5 K/UL (ref 0–0.4)
EOSINOPHIL NFR BLD: 8 % (ref 0–7)
ERYTHROCYTE [DISTWIDTH] IN BLOOD BY AUTOMATED COUNT: 15.5 % (ref 11.5–14.5)
HCT VFR BLD AUTO: 25.2 % (ref 35–47)
HGB BLD-MCNC: 8.7 G/DL (ref 11.5–16)
IMM GRANULOCYTES # BLD AUTO: 0 K/UL
IMM GRANULOCYTES NFR BLD AUTO: 0 %
LYMPHOCYTES # BLD: 3.3 K/UL (ref 0.8–3.5)
LYMPHOCYTES NFR BLD: 50 % (ref 12–49)
MCH RBC QN AUTO: 33.6 PG (ref 26–34)
MCHC RBC AUTO-ENTMCNC: 34.5 G/DL (ref 30–36.5)
MCV RBC AUTO: 97.3 FL (ref 80–99)
METAMYELOCYTES NFR BLD MANUAL: 0 %
MONOCYTES # BLD: 0.5 K/UL (ref 0–1)
MONOCYTES NFR BLD: 7 % (ref 5–13)
MYELOCYTES NFR BLD MANUAL: 0 %
NEUTS BAND NFR BLD MANUAL: 0 % (ref 0–6)
NEUTS SEG # BLD: 2.2 K/UL (ref 1.8–8)
NEUTS SEG NFR BLD: 33 % (ref 32–75)
NRBC # BLD: 0.1 K/UL (ref 0–0.01)
NRBC BLD-RTO: 1.5 PER 100 WBC
OTHER CELLS NFR BLD MANUAL: 0 %
PLATELET # BLD AUTO: 158 K/UL (ref 150–400)
PMV BLD AUTO: 11.3 FL (ref 8.9–12.9)
PROMYELOCYTES NFR BLD MANUAL: 0 %
RBC # BLD AUTO: 2.59 M/UL (ref 3.8–5.2)
RBC MORPH BLD: ABNORMAL
WBC # BLD AUTO: 6.6 K/UL (ref 3.6–11)

## 2019-10-11 PROCEDURE — 74011250637 HC RX REV CODE- 250/637: Performed by: INTERNAL MEDICINE

## 2019-10-11 PROCEDURE — 77010033678 HC OXYGEN DAILY

## 2019-10-11 PROCEDURE — 36415 COLL VENOUS BLD VENIPUNCTURE: CPT

## 2019-10-11 PROCEDURE — 74011250636 HC RX REV CODE- 250/636: Performed by: INTERNAL MEDICINE

## 2019-10-11 PROCEDURE — 65660000000 HC RM CCU STEPDOWN

## 2019-10-11 PROCEDURE — 85027 COMPLETE CBC AUTOMATED: CPT

## 2019-10-11 PROCEDURE — 94760 N-INVAS EAR/PLS OXIMETRY 1: CPT

## 2019-10-11 RX ADMIN — FOLIC ACID 1 MG: 1 TABLET ORAL at 21:53

## 2019-10-11 RX ADMIN — ENOXAPARIN SODIUM 40 MG: 40 INJECTION, SOLUTION INTRAVENOUS; SUBCUTANEOUS at 05:14

## 2019-10-11 RX ADMIN — DEXTROSE MONOHYDRATE, SODIUM CHLORIDE, AND POTASSIUM CHLORIDE: 50; 4.5; 2.24 INJECTION, SOLUTION INTRAVENOUS at 08:42

## 2019-10-11 RX ADMIN — PANTOPRAZOLE SODIUM 40 MG: 40 TABLET, DELAYED RELEASE ORAL at 21:53

## 2019-10-11 RX ADMIN — Medication 1 CAPSULE: at 08:44

## 2019-10-11 RX ADMIN — ONDANSETRON HYDROCHLORIDE 4 MG: 2 INJECTION, SOLUTION INTRAMUSCULAR; INTRAVENOUS at 01:05

## 2019-10-11 RX ADMIN — HYDROMORPHONE HYDROCHLORIDE 1 MG: 1 INJECTION, SOLUTION INTRAMUSCULAR; INTRAVENOUS; SUBCUTANEOUS at 15:17

## 2019-10-11 RX ADMIN — HYDROMORPHONE HYDROCHLORIDE 1 MG: 1 INJECTION, SOLUTION INTRAMUSCULAR; INTRAVENOUS; SUBCUTANEOUS at 12:15

## 2019-10-11 RX ADMIN — HYDROXYUREA 500 MG: 500 CAPSULE ORAL at 09:35

## 2019-10-11 RX ADMIN — CITALOPRAM HYDROBROMIDE 10 MG: 20 TABLET, FILM COATED ORAL at 08:45

## 2019-10-11 RX ADMIN — ONDANSETRON HYDROCHLORIDE 4 MG: 2 INJECTION, SOLUTION INTRAMUSCULAR; INTRAVENOUS at 05:15

## 2019-10-11 RX ADMIN — BISACODYL 5 MG: 5 TABLET, COATED ORAL at 08:45

## 2019-10-11 RX ADMIN — DIPHENHYDRAMINE HYDROCHLORIDE 25 MG: 50 INJECTION, SOLUTION INTRAMUSCULAR; INTRAVENOUS at 09:00

## 2019-10-11 RX ADMIN — HYDROMORPHONE HYDROCHLORIDE 1 MG: 1 INJECTION, SOLUTION INTRAMUSCULAR; INTRAVENOUS; SUBCUTANEOUS at 08:49

## 2019-10-11 RX ADMIN — HYDROMORPHONE HYDROCHLORIDE 1 MG: 1 INJECTION, SOLUTION INTRAMUSCULAR; INTRAVENOUS; SUBCUTANEOUS at 01:06

## 2019-10-11 RX ADMIN — ONDANSETRON HYDROCHLORIDE 4 MG: 2 INJECTION, SOLUTION INTRAMUSCULAR; INTRAVENOUS at 15:18

## 2019-10-11 RX ADMIN — ONDANSETRON HYDROCHLORIDE 4 MG: 2 INJECTION, SOLUTION INTRAMUSCULAR; INTRAVENOUS at 12:15

## 2019-10-11 RX ADMIN — ONDANSETRON HYDROCHLORIDE 4 MG: 2 INJECTION, SOLUTION INTRAMUSCULAR; INTRAVENOUS at 23:07

## 2019-10-11 RX ADMIN — DIPHENHYDRAMINE HYDROCHLORIDE 25 MG: 50 INJECTION, SOLUTION INTRAMUSCULAR; INTRAVENOUS at 15:18

## 2019-10-11 RX ADMIN — HYDROMORPHONE HYDROCHLORIDE 1 MG: 1 INJECTION, SOLUTION INTRAMUSCULAR; INTRAVENOUS; SUBCUTANEOUS at 05:15

## 2019-10-11 RX ADMIN — HYDROMORPHONE HYDROCHLORIDE 1 MG: 1 INJECTION, SOLUTION INTRAMUSCULAR; INTRAVENOUS; SUBCUTANEOUS at 18:52

## 2019-10-11 RX ADMIN — ONDANSETRON HYDROCHLORIDE 4 MG: 2 INJECTION, SOLUTION INTRAMUSCULAR; INTRAVENOUS at 08:49

## 2019-10-11 RX ADMIN — HYDROMORPHONE HYDROCHLORIDE 1 MG: 1 INJECTION, SOLUTION INTRAMUSCULAR; INTRAVENOUS; SUBCUTANEOUS at 21:54

## 2019-10-11 RX ADMIN — DIPHENHYDRAMINE HYDROCHLORIDE 25 MG: 50 INJECTION, SOLUTION INTRAMUSCULAR; INTRAVENOUS at 21:54

## 2019-10-11 RX ADMIN — Medication 10 ML: at 21:54

## 2019-10-11 RX ADMIN — DEXTROSE MONOHYDRATE, SODIUM CHLORIDE, AND POTASSIUM CHLORIDE: 50; 4.5; 2.24 INJECTION, SOLUTION INTRAVENOUS at 18:52

## 2019-10-11 RX ADMIN — DIPHENHYDRAMINE HYDROCHLORIDE 25 MG: 50 INJECTION, SOLUTION INTRAMUSCULAR; INTRAVENOUS at 01:05

## 2019-10-11 RX ADMIN — ONDANSETRON HYDROCHLORIDE 4 MG: 2 INJECTION, SOLUTION INTRAMUSCULAR; INTRAVENOUS at 18:52

## 2019-10-11 RX ADMIN — LOSARTAN POTASSIUM 50 MG: 50 TABLET, FILM COATED ORAL at 21:54

## 2019-10-11 RX ADMIN — POLYETHYLENE GLYCOL 3350 17 G: 17 POWDER, FOR SOLUTION ORAL at 08:49

## 2019-10-11 RX ADMIN — Medication 10 ML: at 05:14

## 2019-10-11 NOTE — PROGRESS NOTES
General Daily Progress Note    Admit Date: 10/3/2019    Subjective:     Feeling better - continues to request dilaudid     Current Facility-Administered Medications   Medication Dose Route Frequency    lactobac ac& pc-s.therm-b.anim (CARLITOS Q/RISAQUAD)  1 Cap Oral DAILY    0.9% sodium chloride infusion 250 mL  250 mL IntraVENous PRN    polyethylene glycol (MIRALAX) packet 17 g  17 g Oral DAILY    fentaNYL (DURAGESIC) 75 mcg/hr patch 1 Patch  1 Patch TransDERmal Q72H    dextrose 5% - 0.45% NaCl with KCl 30 mEq/L infusion   IntraVENous CONTINUOUS    sodium chloride (NS) flush 5-40 mL  5-40 mL IntraVENous Q8H    sodium chloride (NS) flush 5-40 mL  5-40 mL IntraVENous PRN    naloxone (NARCAN) injection 0.4 mg  0.4 mg IntraVENous PRN    ondansetron (ZOFRAN) injection 4 mg  4 mg IntraVENous Q4H PRN    bisacodyl (DULCOLAX) tablet 5 mg  5 mg Oral DAILY PRN    enoxaparin (LOVENOX) injection 40 mg  40 mg SubCUTAneous Q24H    diphenhydrAMINE (BENADRYL) injection 25 mg  25 mg IntraVENous Q6H PRN    hydroxyurea (HYDREA) chemo cap 500 mg  500 mg Oral DAILY    folic acid (FOLVITE) tablet 1 mg  1 mg Oral QHS    pantoprazole (PROTONIX) tablet 40 mg  40 mg Oral QHS    citalopram (CELEXA) tablet 10 mg  10 mg Oral DAILY    losartan (COZAAR) tablet 50 mg  50 mg Oral QHS    HYDROmorphone (PF) (DILAUDID) injection 1 mg  1 mg IntraVENous Q3H PRN    acetaminophen (TYLENOL) tablet 650 mg  650 mg Oral Q4H PRN        Review of Systems  A comprehensive review of systems was negative. Objective:     Patient Vitals for the past 24 hrs:   BP Temp Pulse Resp SpO2   10/11/19 1553 139/85 98.9 °F (37.2 °C) 83 16 98 %   10/11/19 1105 123/76 97.9 °F (36.6 °C) 71 14 95 %   10/11/19 0753 114/71 98.6 °F (37 °C) 78 14 98 %   10/11/19 0351 115/73 98.6 °F (37 °C) 77 17 96 %   10/10/19 2326 117/62 98.6 °F (37 °C) 82 16 97 %   10/10/19 1925 124/74 97.8 °F (36.6 °C) 85 14 96 %     No intake/output data recorded.   10/09 1901 - 10/11 0700  In: 1285 [I.V.:1285]  Out: -     Physical Exam:   Visit Vitals  /85 (BP 1 Location: Left arm, BP Patient Position: Sitting)   Pulse 83   Temp 98.9 °F (37.2 °C)   Resp 16   Ht 5' 11\" (1.803 m)   Wt 206 lb 2.1 oz (93.5 kg)   SpO2 98%   BMI 28.75 kg/m²     General appearance: alert, cooperative, no distress, appears stated age  Neck: supple, symmetrical, trachea midline, no adenopathy, thyroid: not enlarged, symmetric, no tenderness/mass/nodules, no carotid bruit and no JVD  Lungs: clear to auscultation bilaterally  Heart: regular rate and rhythm, S1, S2 normal, no murmur, click, rub or gallop  Abdomen: soft, non-tender. Bowel sounds normal. No masses,  no organomegaly  Extremities: extremities normal, atraumatic, no cyanosis or edema        Data Review   Recent Results (from the past 24 hour(s))   CBC WITH MANUAL DIFF    Collection Time: 10/11/19  1:05 AM   Result Value Ref Range    WBC 6.6 3.6 - 11.0 K/uL    RBC 2.59 (L) 3.80 - 5.20 M/uL    HGB 8.7 (L) 11.5 - 16.0 g/dL    HCT 25.2 (L) 35.0 - 47.0 %    MCV 97.3 80.0 - 99.0 FL    MCH 33.6 26.0 - 34.0 PG    MCHC 34.5 30.0 - 36.5 g/dL    RDW 15.5 (H) 11.5 - 14.5 %    PLATELET 729 653 - 441 K/uL    MPV 11.3 8.9 - 12.9 FL    NRBC 1.5 (H) 0  WBC    ABSOLUTE NRBC 0.10 (H) 0.00 - 0.01 K/uL    NEUTROPHILS 33 32 - 75 %    BAND NEUTROPHILS 0 0 - 6 %    LYMPHOCYTES 50 (H) 12 - 49 %    MONOCYTES 7 5 - 13 %    EOSINOPHILS 8 (H) 0 - 7 %    BASOPHILS 2 (H) 0 - 1 %    METAMYELOCYTES 0 0 %    MYELOCYTES 0 0 %    PROMYELOCYTES 0 0 %    BLASTS 0 0 %    OTHER CELL 0 0      IMMATURE GRANULOCYTES 0 %    ABS. NEUTROPHILS 2.2 1.8 - 8.0 K/UL    ABS. LYMPHOCYTES 3.3 0.8 - 3.5 K/UL    ABS. MONOCYTES 0.5 0.0 - 1.0 K/UL    ABS. EOSINOPHILS 0.5 (H) 0.0 - 0.4 K/UL    ABS. BASOPHILS 0.1 0.0 - 0.1 K/UL    ABS. IMM.  GRANS. 0.0 K/UL    DF MANUAL      RBC COMMENTS ANISOCYTOSIS  1+        RBC COMMENTS MACROCYTOSIS  1+        RBC COMMENTS TARGET CELLS  PRESENT               Assessment:     Active Problems:    Sickle cell pain crisis ([facility] [address] (9/8/2018)        Plan:     1. H/h stable this am -continue to monitor  2. Continue hydration, analgesics, and will discontinue antibiotics for now.

## 2019-10-12 LAB
BASOPHILS # BLD: 0.1 K/UL (ref 0–0.1)
BASOPHILS NFR BLD: 1 % (ref 0–1)
DIFFERENTIAL METHOD BLD: ABNORMAL
EOSINOPHIL # BLD: 0.4 K/UL (ref 0–0.4)
EOSINOPHIL NFR BLD: 6 % (ref 0–7)
ERYTHROCYTE [DISTWIDTH] IN BLOOD BY AUTOMATED COUNT: 15.6 % (ref 11.5–14.5)
HCT VFR BLD AUTO: 27.5 % (ref 35–47)
HGB BLD-MCNC: 9.4 G/DL (ref 11.5–16)
IMM GRANULOCYTES # BLD AUTO: 0 K/UL (ref 0–0.04)
IMM GRANULOCYTES NFR BLD AUTO: 0 % (ref 0–0.5)
LYMPHOCYTES # BLD: 2.8 K/UL (ref 0.8–3.5)
LYMPHOCYTES NFR BLD: 44 % (ref 12–49)
MCH RBC QN AUTO: 33.8 PG (ref 26–34)
MCHC RBC AUTO-ENTMCNC: 34.2 G/DL (ref 30–36.5)
MCV RBC AUTO: 98.9 FL (ref 80–99)
MONOCYTES # BLD: 0.9 K/UL (ref 0–1)
MONOCYTES NFR BLD: 13 % (ref 5–13)
NEUTS SEG # BLD: 2.4 K/UL (ref 1.8–8)
NEUTS SEG NFR BLD: 36 % (ref 32–75)
NRBC # BLD: 0.06 K/UL (ref 0–0.01)
NRBC BLD-RTO: 0.9 PER 100 WBC
PLATELET # BLD AUTO: 154 K/UL (ref 150–400)
PMV BLD AUTO: 11.5 FL (ref 8.9–12.9)
RBC # BLD AUTO: 2.78 M/UL (ref 3.8–5.2)
RBC MORPH BLD: ABNORMAL
WBC # BLD AUTO: 6.6 K/UL (ref 3.6–11)

## 2019-10-12 PROCEDURE — 74011250637 HC RX REV CODE- 250/637: Performed by: INTERNAL MEDICINE

## 2019-10-12 PROCEDURE — 65660000000 HC RM CCU STEPDOWN

## 2019-10-12 PROCEDURE — 74011250636 HC RX REV CODE- 250/636: Performed by: INTERNAL MEDICINE

## 2019-10-12 PROCEDURE — 36415 COLL VENOUS BLD VENIPUNCTURE: CPT

## 2019-10-12 PROCEDURE — 94760 N-INVAS EAR/PLS OXIMETRY 1: CPT

## 2019-10-12 PROCEDURE — 85025 COMPLETE CBC W/AUTO DIFF WBC: CPT

## 2019-10-12 PROCEDURE — 77010033678 HC OXYGEN DAILY

## 2019-10-12 RX ADMIN — HYDROMORPHONE HYDROCHLORIDE 1 MG: 1 INJECTION, SOLUTION INTRAMUSCULAR; INTRAVENOUS; SUBCUTANEOUS at 21:30

## 2019-10-12 RX ADMIN — HYDROXYUREA 500 MG: 500 CAPSULE ORAL at 08:47

## 2019-10-12 RX ADMIN — HYDROMORPHONE HYDROCHLORIDE 1 MG: 1 INJECTION, SOLUTION INTRAMUSCULAR; INTRAVENOUS; SUBCUTANEOUS at 08:43

## 2019-10-12 RX ADMIN — ONDANSETRON HYDROCHLORIDE 4 MG: 2 INJECTION, SOLUTION INTRAMUSCULAR; INTRAVENOUS at 04:29

## 2019-10-12 RX ADMIN — DIPHENHYDRAMINE HYDROCHLORIDE 25 MG: 50 INJECTION, SOLUTION INTRAMUSCULAR; INTRAVENOUS at 18:29

## 2019-10-12 RX ADMIN — DIPHENHYDRAMINE HYDROCHLORIDE 25 MG: 50 INJECTION, SOLUTION INTRAMUSCULAR; INTRAVENOUS at 04:28

## 2019-10-12 RX ADMIN — ONDANSETRON HYDROCHLORIDE 4 MG: 2 INJECTION, SOLUTION INTRAMUSCULAR; INTRAVENOUS at 12:23

## 2019-10-12 RX ADMIN — ONDANSETRON HYDROCHLORIDE 4 MG: 2 INJECTION, SOLUTION INTRAMUSCULAR; INTRAVENOUS at 08:43

## 2019-10-12 RX ADMIN — HYDROMORPHONE HYDROCHLORIDE 1 MG: 1 INJECTION, SOLUTION INTRAMUSCULAR; INTRAVENOUS; SUBCUTANEOUS at 15:31

## 2019-10-12 RX ADMIN — PANTOPRAZOLE SODIUM 40 MG: 40 TABLET, DELAYED RELEASE ORAL at 21:29

## 2019-10-12 RX ADMIN — BISACODYL 5 MG: 5 TABLET, COATED ORAL at 08:42

## 2019-10-12 RX ADMIN — ENOXAPARIN SODIUM 40 MG: 40 INJECTION, SOLUTION INTRAVENOUS; SUBCUTANEOUS at 04:29

## 2019-10-12 RX ADMIN — HYDROMORPHONE HYDROCHLORIDE 1 MG: 1 INJECTION, SOLUTION INTRAMUSCULAR; INTRAVENOUS; SUBCUTANEOUS at 04:28

## 2019-10-12 RX ADMIN — DEXTROSE MONOHYDRATE, SODIUM CHLORIDE, AND POTASSIUM CHLORIDE: 50; 4.5; 2.24 INJECTION, SOLUTION INTRAVENOUS at 14:53

## 2019-10-12 RX ADMIN — HYDROMORPHONE HYDROCHLORIDE 1 MG: 1 INJECTION, SOLUTION INTRAMUSCULAR; INTRAVENOUS; SUBCUTANEOUS at 12:23

## 2019-10-12 RX ADMIN — DIPHENHYDRAMINE HYDROCHLORIDE 25 MG: 50 INJECTION, SOLUTION INTRAMUSCULAR; INTRAVENOUS at 12:24

## 2019-10-12 RX ADMIN — ONDANSETRON HYDROCHLORIDE 4 MG: 2 INJECTION, SOLUTION INTRAMUSCULAR; INTRAVENOUS at 15:31

## 2019-10-12 RX ADMIN — DEXTROSE MONOHYDRATE, SODIUM CHLORIDE, AND POTASSIUM CHLORIDE: 50; 4.5; 2.24 INJECTION, SOLUTION INTRAVENOUS at 04:36

## 2019-10-12 RX ADMIN — FOLIC ACID 1 MG: 1 TABLET ORAL at 21:29

## 2019-10-12 RX ADMIN — HYDROMORPHONE HYDROCHLORIDE 1 MG: 1 INJECTION, SOLUTION INTRAMUSCULAR; INTRAVENOUS; SUBCUTANEOUS at 18:28

## 2019-10-12 RX ADMIN — ONDANSETRON HYDROCHLORIDE 4 MG: 2 INJECTION, SOLUTION INTRAMUSCULAR; INTRAVENOUS at 18:32

## 2019-10-12 RX ADMIN — POLYETHYLENE GLYCOL 3350 17 G: 17 POWDER, FOR SOLUTION ORAL at 08:43

## 2019-10-12 RX ADMIN — Medication 1 CAPSULE: at 08:42

## 2019-10-12 RX ADMIN — Medication 10 ML: at 21:30

## 2019-10-12 RX ADMIN — CITALOPRAM HYDROBROMIDE 10 MG: 20 TABLET, FILM COATED ORAL at 08:42

## 2019-10-12 RX ADMIN — ONDANSETRON HYDROCHLORIDE 4 MG: 2 INJECTION, SOLUTION INTRAMUSCULAR; INTRAVENOUS at 22:33

## 2019-10-12 RX ADMIN — HYDROMORPHONE HYDROCHLORIDE 1 MG: 1 INJECTION, SOLUTION INTRAMUSCULAR; INTRAVENOUS; SUBCUTANEOUS at 01:17

## 2019-10-12 RX ADMIN — LINACLOTIDE 290 MCG: 290 CAPSULE, GELATIN COATED ORAL at 09:05

## 2019-10-12 RX ADMIN — LOSARTAN POTASSIUM 50 MG: 50 TABLET, FILM COATED ORAL at 21:30

## 2019-10-12 NOTE — PROGRESS NOTES
Patient Active Problem List   Diagnosis Code    Hypertension I10    Venous insufficiency I87.2    Depression F32.9    Sickle cell crisis (HCC) D57.00    Pulsatile tinnitus H93. A9    Carpal tunnel syndrome of right wrist G56.01    Sickle cell pain crisis ([facility] [address] D57.00    Overweight (BMI 25.0-29. 9) E66.3    Sickle cell disease (HCC) D57.1    Class 1 obesity due to excess calories with serious comorbidity and body mass index (BMI) of 30.0 to 30.9 in adult E66.09, Z68.30     Allergies   Allergen Reactions    Dilaudid [Hydromorphone (Bulk)] Itching     Can tolerate with benadryl and ondansetron    Percocet [Oxycodone-Acetaminophen] Itching       Current Facility-Administered Medications:     lactobac ac& pc-s.therm-b.anim (CARLITOS Q/RISAQUAD), 1 Cap, Oral, DAILY, Nate Jacobs MD, 1 Cap at 10/11/19 0844    0.9% sodium chloride infusion 250 mL, 250 mL, IntraVENous, PRN, Nate Jacobs MD    polyethylene glycol (MIRALAX) packet 17 g, 17 g, Oral, DAILY, Nate Jacobs MD, 17 g at 10/11/19 0849    fentaNYL (DURAGESIC) 75 mcg/hr patch 1 Patch, 1 Patch, TransDERmal, Q72H, Nate Jacobs MD, 1 Patch at 10/11/19 0900    dextrose 5% - 0.45% NaCl with KCl 30 mEq/L infusion, , IntraVENous, CONTINUOUS, Nate Jacobs MD, Last Rate: 100 mL/hr at 10/12/19 0436    sodium chloride (NS) flush 5-40 mL, 5-40 mL, IntraVENous, Q8H, Vega Alejandra MD, 10 mL at 10/11/19 2154    sodium chloride (NS) flush 5-40 mL, 5-40 mL, IntraVENous, PRN, Vega Alejandra MD, 10 mL at 10/04/19 1442    naloxone (NARCAN) injection 0.4 mg, 0.4 mg, IntraVENous, PRN, Vega Alejandra MD    ondansetron [facility]) injection 4 mg, 4 mg, IntraVENous, Q4H PRN, Vega Alejandra MD, 4 mg at 10/12/19 0429    bisacodyl (DULCOLAX) tablet 5 mg, 5 mg, Oral, DAILY PRN, Vega Alejandra MD, 5 mg at 10/11/19 0845    enoxaparin (LOVENOX) injection 40 mg, 40 mg, SubCUTAneous, Q24H, Vega Alejandra MD, 40 mg at 10/12/19 0429    diphenhydrAMINE (BENADRYL) injection 25 mg, 25 mg, IntraVENous, Q6H PRN, Jazmin Black MD, 25 mg at 10/12/19 0428    hydroxyurea (HYDREA) chemo cap 500 mg, 500 mg, Oral, DAILY, Jazmin Black MD, 500 mg at 69/54/14 6099    folic acid (FOLVITE) tablet 1 mg, 1 mg, Oral, QHS, Jazmin Black MD, 1 mg at 10/11/19 2153    pantoprazole (PROTONIX) tablet 40 mg, 40 mg, Oral, QHS, Jazmin Black MD, 40 mg at 10/11/19 2153    citalopram (CELEXA) tablet 10 mg, 10 mg, Oral, DAILY, Jazmin Black MD, 10 mg at 10/11/19 0845    losartan (COZAAR) tablet 50 mg, 50 mg, Oral, QHS, Jazmin Black MD, 50 mg at 10/11/19 2154    HYDROmorphone (PF) (DILAUDID) injection 1 mg, 1 mg, IntraVENous, Q3H PRN, London Jose MD, 1 mg at 10/12/19 0428    acetaminophen (TYLENOL) tablet 650 mg, 650 mg, Oral, Q4H PRN, London Jose MD, 650 mg at 10/06/19 1856  Recent Results (from the past 24 hour(s))   CBC WITH AUTOMATED DIFF    Collection Time: 10/12/19  4:12 AM   Result Value Ref Range    WBC 6.6 3.6 - 11.0 K/uL    RBC 2.78 (L) 3.80 - 5.20 M/uL    HGB 9.4 (L) 11.5 - 16.0 g/dL    HCT 27.5 (L) 35.0 - 47.0 %    MCV 98.9 80.0 - 99.0 FL    MCH 33.8 26.0 - 34.0 PG    MCHC 34.2 30.0 - 36.5 g/dL    RDW 15.6 (H) 11.5 - 14.5 %    PLATELET 506 290 - 642 K/uL    MPV 11.5 8.9 - 12.9 FL    NRBC 0.9 (H) 0  WBC    ABSOLUTE NRBC 0.06 (H) 0.00 - 0.01 K/uL    NEUTROPHILS 36 32 - 75 %    LYMPHOCYTES 44 12 - 49 %    MONOCYTES 13 5 - 13 %    EOSINOPHILS 6 0 - 7 %    BASOPHILS 1 0 - 1 %    IMMATURE GRANULOCYTES 0 0.0 - 0.5 %    ABS. NEUTROPHILS 2.4 1.8 - 8.0 K/UL    ABS. LYMPHOCYTES 2.8 0.8 - 3.5 K/UL    ABS. MONOCYTES 0.9 0.0 - 1.0 K/UL    ABS. EOSINOPHILS 0.4 0.0 - 0.4 K/UL    ABS. BASOPHILS 0.1 0.0 - 0.1 K/UL    ABS. IMM.  GRANS. 0.0 0.00 - 0.04 K/UL    DF SMEAR SCANNED      RBC COMMENTS ANISOCYTOSIS  1+        RBC COMMENTS TARGET CELLS  1+        RBC COMMENTS OVALOCYTES  1+        RBC COMMENTS MACROCYTOSIS  1+         Patient Vitals for the past 24 hrs:   Temp Pulse Resp BP SpO2   10/12/19 0246 98 °F (36.7 °C) 80 16 136/74 97 %   10/11/19 2345 97.5 °F (36.4 °C) 84 16 148/79 100 %   10/11/19 1918 98.1 °F (36.7 °C) 82 16 138/78 93 %   10/11/19 1553 98.9 °F (37.2 °C) 83 16 139/85 98 %   10/11/19 1105 97.9 °F (36.6 °C) 71 14 123/76 95 %   10/11/19 0753 98.6 °F (37 °C) 78 14 114/71 98 %     No complaints of pain    Lungs clear.  Heart RRR    Stable  Continue meds

## 2019-10-12 NOTE — PROGRESS NOTES
Bedside and Verbal shift change report given to [place] (oncoming nurse) by Ford Hunt RN (offgoing nurse). Report included the following information SBAR, Kardex, Intake/Output, MAR, Accordion, Recent Results and Med Rec Status.      Dilaudid, Benandry and zofran given- see MAR

## 2019-10-13 ENCOUNTER — PATIENT OUTREACH (OUTPATIENT)
Dept: INTERNAL MEDICINE CLINIC | Age: 59
End: 2019-10-13

## 2019-10-13 PROCEDURE — 74011250637 HC RX REV CODE- 250/637: Performed by: INTERNAL MEDICINE

## 2019-10-13 PROCEDURE — 74011250636 HC RX REV CODE- 250/636: Performed by: INTERNAL MEDICINE

## 2019-10-13 PROCEDURE — 94760 N-INVAS EAR/PLS OXIMETRY 1: CPT

## 2019-10-13 PROCEDURE — 77010033678 HC OXYGEN DAILY

## 2019-10-13 PROCEDURE — 65660000000 HC RM CCU STEPDOWN

## 2019-10-13 RX ADMIN — HYDROMORPHONE HYDROCHLORIDE 1 MG: 1 INJECTION, SOLUTION INTRAMUSCULAR; INTRAVENOUS; SUBCUTANEOUS at 12:35

## 2019-10-13 RX ADMIN — DEXTROSE MONOHYDRATE, SODIUM CHLORIDE, AND POTASSIUM CHLORIDE: 50; 4.5; 2.24 INJECTION, SOLUTION INTRAVENOUS at 21:37

## 2019-10-13 RX ADMIN — HYDROMORPHONE HYDROCHLORIDE 1 MG: 1 INJECTION, SOLUTION INTRAMUSCULAR; INTRAVENOUS; SUBCUTANEOUS at 09:43

## 2019-10-13 RX ADMIN — CITALOPRAM HYDROBROMIDE 10 MG: 20 TABLET, FILM COATED ORAL at 09:42

## 2019-10-13 RX ADMIN — HYDROMORPHONE HYDROCHLORIDE 1 MG: 1 INJECTION, SOLUTION INTRAMUSCULAR; INTRAVENOUS; SUBCUTANEOUS at 06:35

## 2019-10-13 RX ADMIN — LOSARTAN POTASSIUM 50 MG: 50 TABLET, FILM COATED ORAL at 21:35

## 2019-10-13 RX ADMIN — ONDANSETRON HYDROCHLORIDE 4 MG: 2 INJECTION, SOLUTION INTRAMUSCULAR; INTRAVENOUS at 19:27

## 2019-10-13 RX ADMIN — HYDROMORPHONE HYDROCHLORIDE 1 MG: 1 INJECTION, SOLUTION INTRAMUSCULAR; INTRAVENOUS; SUBCUTANEOUS at 03:28

## 2019-10-13 RX ADMIN — Medication 1 CAPSULE: at 09:42

## 2019-10-13 RX ADMIN — HYDROMORPHONE HYDROCHLORIDE 1 MG: 1 INJECTION, SOLUTION INTRAMUSCULAR; INTRAVENOUS; SUBCUTANEOUS at 22:47

## 2019-10-13 RX ADMIN — LINACLOTIDE 290 MCG: 290 CAPSULE, GELATIN COATED ORAL at 09:42

## 2019-10-13 RX ADMIN — Medication 10 ML: at 06:37

## 2019-10-13 RX ADMIN — FOLIC ACID 1 MG: 1 TABLET ORAL at 21:35

## 2019-10-13 RX ADMIN — ONDANSETRON HYDROCHLORIDE 4 MG: 2 INJECTION, SOLUTION INTRAMUSCULAR; INTRAVENOUS at 09:43

## 2019-10-13 RX ADMIN — DIPHENHYDRAMINE HYDROCHLORIDE 25 MG: 50 INJECTION, SOLUTION INTRAMUSCULAR; INTRAVENOUS at 00:37

## 2019-10-13 RX ADMIN — HYDROMORPHONE HYDROCHLORIDE 1 MG: 1 INJECTION, SOLUTION INTRAMUSCULAR; INTRAVENOUS; SUBCUTANEOUS at 19:27

## 2019-10-13 RX ADMIN — DIPHENHYDRAMINE HYDROCHLORIDE 25 MG: 50 INJECTION, SOLUTION INTRAMUSCULAR; INTRAVENOUS at 19:28

## 2019-10-13 RX ADMIN — ONDANSETRON HYDROCHLORIDE 4 MG: 2 INJECTION, SOLUTION INTRAMUSCULAR; INTRAVENOUS at 03:28

## 2019-10-13 RX ADMIN — DEXTROSE MONOHYDRATE, SODIUM CHLORIDE, AND POTASSIUM CHLORIDE: 50; 4.5; 2.24 INJECTION, SOLUTION INTRAVENOUS at 00:52

## 2019-10-13 RX ADMIN — HYDROMORPHONE HYDROCHLORIDE 1 MG: 1 INJECTION, SOLUTION INTRAMUSCULAR; INTRAVENOUS; SUBCUTANEOUS at 15:40

## 2019-10-13 RX ADMIN — DIPHENHYDRAMINE HYDROCHLORIDE 25 MG: 50 INJECTION, SOLUTION INTRAMUSCULAR; INTRAVENOUS at 06:37

## 2019-10-13 RX ADMIN — DIPHENHYDRAMINE HYDROCHLORIDE 25 MG: 50 INJECTION, SOLUTION INTRAMUSCULAR; INTRAVENOUS at 12:34

## 2019-10-13 RX ADMIN — HYDROXYUREA 500 MG: 500 CAPSULE ORAL at 09:43

## 2019-10-13 RX ADMIN — HYDROMORPHONE HYDROCHLORIDE 1 MG: 1 INJECTION, SOLUTION INTRAMUSCULAR; INTRAVENOUS; SUBCUTANEOUS at 00:37

## 2019-10-13 RX ADMIN — ONDANSETRON HYDROCHLORIDE 4 MG: 2 INJECTION, SOLUTION INTRAMUSCULAR; INTRAVENOUS at 12:44

## 2019-10-13 RX ADMIN — PANTOPRAZOLE SODIUM 40 MG: 40 TABLET, DELAYED RELEASE ORAL at 21:35

## 2019-10-13 RX ADMIN — Medication 10 ML: at 21:35

## 2019-10-13 RX ADMIN — ENOXAPARIN SODIUM 40 MG: 40 INJECTION, SOLUTION INTRAVENOUS; SUBCUTANEOUS at 06:37

## 2019-10-13 RX ADMIN — POLYETHYLENE GLYCOL 3350 17 G: 17 POWDER, FOR SOLUTION ORAL at 09:43

## 2019-10-13 NOTE — PROGRESS NOTES
Per report Sarah Douglas RN unable to draw labs for the night, this RN attempted to draw labs twice unsuccessfully will notify physician when rounding on patient.

## 2019-10-13 NOTE — PROGRESS NOTES
Bedside and Verbal shift change report given to [place] (oncoming nurse) by Melissa Vogt RN (offgoing nurse). Report included the following information SBAR, Kardex, Intake/Output, MAR, Accordion, Recent Results and Med Rec Status.

## 2019-10-13 NOTE — PROGRESS NOTES
Patient Active Problem List   Diagnosis Code    Hypertension I10    Venous insufficiency I87.2    Depression F32.9    Sickle cell crisis (HCC) D57.00    Pulsatile tinnitus H93. A9    Carpal tunnel syndrome of right wrist G56.01    Sickle cell pain crisis ([facility] [address] D57.00    Overweight (BMI 25.0-29. 9) E66.3    Sickle cell disease (HCC) D57.1    Class 1 obesity due to excess calories with serious comorbidity and body mass index (BMI) of 30.0 to 30.9 in adult E66.09, Z68.30       Current Facility-Administered Medications:     linaCLOtide (LINZESS) capsule 290 mcg, 290 mcg, Oral, ACB, Hellen Zacarias MD, 290 mcg at 10/12/19 0905    lactobac ac& pc-s.therm-b.anim (CARLITOS Q/RISAQUAD), 1 Cap, Oral, DAILY, Christian Miguel MD, 1 Cap at 10/12/19 0842    0.9% sodium chloride infusion 250 mL, 250 mL, IntraVENous, PRN, hCristian Miguel MD    polyethylene glycol (MIRALAX) packet 17 g, 17 g, Oral, DAILY, Christian Miguel MD, 17 g at 10/12/19 0843    fentaNYL (DURAGESIC) 75 mcg/hr patch 1 Patch, 1 Patch, TransDERmal, Q72H, Christian Miguel MD, 1 Patch at 10/11/19 0900    dextrose 5% - 0.45% NaCl with KCl 30 mEq/L infusion, , IntraVENous, CONTINUOUS, Christian Miguel MD, Last Rate: 100 mL/hr at 10/13/19 0052    sodium chloride (NS) flush 5-40 mL, 5-40 mL, IntraVENous, Q8H, Letty Leigh MD, 10 mL at 10/13/19 1848    sodium chloride (NS) flush 5-40 mL, 5-40 mL, IntraVENous, PRN, Letty Leigh MD, 10 mL at 10/04/19 1442    naloxone [facility]) injection 0.4 mg, 0.4 mg, IntraVENous, PRN, Letty Leigh MD    ondansetron [facility]) injection 4 mg, 4 mg, IntraVENous, Q4H PRN, Letty Leigh MD, 4 mg at 10/13/19 0328    bisacodyl (DULCOLAX) tablet 5 mg, 5 mg, Oral, DAILY PRN, Letty Leigh MD, 5 mg at 10/12/19 0842    enoxaparin (LOVENOX) injection 40 mg, 40 mg, SubCUTAneous, Q24H, Letty Leigh MD, 40 mg at 10/13/19 5957    diphenhydrAMINE (BENADRYL) injection 25 mg, 25 mg, IntraVENous, Q6H PRN, Kathi Marin, Dario Montalvo MD, 25 mg at 10/13/19 1917    hydroxyurea (HYDREA) chemo cap 500 mg, 500 mg, Oral, DAILY, Argenis Parnell MD, 500 mg at 14/74/90 0655    folic acid (FOLVITE) tablet 1 mg, 1 mg, Oral, QHS, Argenis Parnell MD, 1 mg at 10/12/19 2129    pantoprazole (PROTONIX) tablet 40 mg, 40 mg, Oral, QHS, Argenis Parnell MD, 40 mg at 10/12/19 2129    citalopram (CELEXA) tablet 10 mg, 10 mg, Oral, DAILY, Argenis Parnell MD, 10 mg at 10/12/19 0842    losartan (COZAAR) tablet 50 mg, 50 mg, Oral, QHS, Argenis Parnell MD, 50 mg at 10/12/19 2130    HYDROmorphone (PF) (DILAUDID) injection 1 mg, 1 mg, IntraVENous, Q3H PRN, Batsheva Cerna MD, 1 mg at 10/13/19 8946    acetaminophen (TYLENOL) tablet 650 mg, 650 mg, Oral, Q4H PRN, Batsheva Cerna MD, 650 mg at 10/06/19 1856  Visit Vitals  /69 (BP 1 Location: Left arm, BP Patient Position: At rest)   Pulse 82   Temp 98.4 °F (36.9 °C)   Resp 16   Ht 5' 11\" (1.803 m)   Wt 93.5 kg (206 lb 2.1 oz)   SpO2 95%   BMI 28.75 kg/m²     No results found for this or any previous visit (from the past 24 hour(s)). Exam: Lungs clear.  Heart RRR  Sickle crisis: pain under control

## 2019-10-14 LAB
BASOPHILS # BLD: 0 K/UL (ref 0–0.1)
BASOPHILS NFR BLD: 1 % (ref 0–1)
DIFFERENTIAL METHOD BLD: ABNORMAL
EOSINOPHIL # BLD: 0.3 K/UL (ref 0–0.4)
EOSINOPHIL NFR BLD: 5 % (ref 0–7)
ERYTHROCYTE [DISTWIDTH] IN BLOOD BY AUTOMATED COUNT: 15 % (ref 11.5–14.5)
HCT VFR BLD AUTO: 25 % (ref 35–47)
HGB BLD-MCNC: 8.4 G/DL (ref 11.5–16)
IMM GRANULOCYTES # BLD AUTO: 0 K/UL (ref 0–0.04)
IMM GRANULOCYTES NFR BLD AUTO: 0 % (ref 0–0.5)
LYMPHOCYTES # BLD: 3.2 K/UL (ref 0.8–3.5)
LYMPHOCYTES NFR BLD: 45 % (ref 12–49)
MCH RBC QN AUTO: 33.1 PG (ref 26–34)
MCHC RBC AUTO-ENTMCNC: 33.6 G/DL (ref 30–36.5)
MCV RBC AUTO: 98.4 FL (ref 80–99)
MONOCYTES # BLD: 1.1 K/UL (ref 0–1)
MONOCYTES NFR BLD: 16 % (ref 5–13)
NEUTS SEG # BLD: 2.3 K/UL (ref 1.8–8)
NEUTS SEG NFR BLD: 33 % (ref 32–75)
NRBC # BLD: 0.02 K/UL (ref 0–0.01)
NRBC BLD-RTO: 0.3 PER 100 WBC
PLATELET # BLD AUTO: 200 K/UL (ref 150–400)
PMV BLD AUTO: 11.2 FL (ref 8.9–12.9)
RBC # BLD AUTO: 2.54 M/UL (ref 3.8–5.2)
WBC # BLD AUTO: 7 K/UL (ref 3.6–11)

## 2019-10-14 PROCEDURE — 36415 COLL VENOUS BLD VENIPUNCTURE: CPT

## 2019-10-14 PROCEDURE — 74011250637 HC RX REV CODE- 250/637: Performed by: INTERNAL MEDICINE

## 2019-10-14 PROCEDURE — 94760 N-INVAS EAR/PLS OXIMETRY 1: CPT

## 2019-10-14 PROCEDURE — 65660000000 HC RM CCU STEPDOWN

## 2019-10-14 PROCEDURE — 74011250636 HC RX REV CODE- 250/636: Performed by: INTERNAL MEDICINE

## 2019-10-14 PROCEDURE — 77010033678 HC OXYGEN DAILY

## 2019-10-14 PROCEDURE — 85025 COMPLETE CBC W/AUTO DIFF WBC: CPT

## 2019-10-14 RX ORDER — HYDROXYUREA 500 MG/1
500 CAPSULE ORAL 2 TIMES DAILY
Status: DISCONTINUED | OUTPATIENT
Start: 2019-10-14 | End: 2019-10-17 | Stop reason: HOSPADM

## 2019-10-14 RX ORDER — HYDROMORPHONE HYDROCHLORIDE 1 MG/ML
1 INJECTION, SOLUTION INTRAMUSCULAR; INTRAVENOUS; SUBCUTANEOUS
Status: DISCONTINUED | OUTPATIENT
Start: 2019-10-14 | End: 2019-10-15

## 2019-10-14 RX ADMIN — HYDROMORPHONE HYDROCHLORIDE 1 MG: 1 INJECTION, SOLUTION INTRAMUSCULAR; INTRAVENOUS; SUBCUTANEOUS at 11:37

## 2019-10-14 RX ADMIN — HYDROMORPHONE HYDROCHLORIDE 1 MG: 1 INJECTION, SOLUTION INTRAMUSCULAR; INTRAVENOUS; SUBCUTANEOUS at 02:16

## 2019-10-14 RX ADMIN — FOLIC ACID 1 MG: 1 TABLET ORAL at 20:45

## 2019-10-14 RX ADMIN — HYDROXYUREA 500 MG: 500 CAPSULE ORAL at 18:04

## 2019-10-14 RX ADMIN — Medication 10 ML: at 22:00

## 2019-10-14 RX ADMIN — ONDANSETRON HYDROCHLORIDE 4 MG: 2 INJECTION, SOLUTION INTRAMUSCULAR; INTRAVENOUS at 07:35

## 2019-10-14 RX ADMIN — HYDROXYUREA 500 MG: 500 CAPSULE ORAL at 08:08

## 2019-10-14 RX ADMIN — Medication 5 ML: at 14:00

## 2019-10-14 RX ADMIN — ENOXAPARIN SODIUM 40 MG: 40 INJECTION, SOLUTION INTRAVENOUS; SUBCUTANEOUS at 05:57

## 2019-10-14 RX ADMIN — CITALOPRAM HYDROBROMIDE 10 MG: 20 TABLET, FILM COATED ORAL at 08:06

## 2019-10-14 RX ADMIN — ONDANSETRON HYDROCHLORIDE 4 MG: 2 INJECTION, SOLUTION INTRAMUSCULAR; INTRAVENOUS at 20:44

## 2019-10-14 RX ADMIN — ONDANSETRON HYDROCHLORIDE 4 MG: 2 INJECTION, SOLUTION INTRAMUSCULAR; INTRAVENOUS at 11:39

## 2019-10-14 RX ADMIN — DIPHENHYDRAMINE HYDROCHLORIDE 25 MG: 50 INJECTION, SOLUTION INTRAMUSCULAR; INTRAVENOUS at 20:44

## 2019-10-14 RX ADMIN — Medication 10 ML: at 20:44

## 2019-10-14 RX ADMIN — DEXTROSE MONOHYDRATE, SODIUM CHLORIDE, AND POTASSIUM CHLORIDE: 50; 4.5; 2.24 INJECTION, SOLUTION INTRAVENOUS at 08:36

## 2019-10-14 RX ADMIN — Medication 1 CAPSULE: at 08:07

## 2019-10-14 RX ADMIN — DEXTROSE MONOHYDRATE, SODIUM CHLORIDE, AND POTASSIUM CHLORIDE 75 ML/HR: 50; 4.5; 2.24 INJECTION, SOLUTION INTRAVENOUS at 20:00

## 2019-10-14 RX ADMIN — LINACLOTIDE 290 MCG: 290 CAPSULE, GELATIN COATED ORAL at 07:30

## 2019-10-14 RX ADMIN — LOSARTAN POTASSIUM 50 MG: 50 TABLET, FILM COATED ORAL at 20:48

## 2019-10-14 RX ADMIN — DIPHENHYDRAMINE HYDROCHLORIDE 25 MG: 50 INJECTION, SOLUTION INTRAMUSCULAR; INTRAVENOUS at 11:39

## 2019-10-14 RX ADMIN — HYDROMORPHONE HYDROCHLORIDE 1 MG: 1 INJECTION, SOLUTION INTRAMUSCULAR; INTRAVENOUS; SUBCUTANEOUS at 20:43

## 2019-10-14 RX ADMIN — ONDANSETRON HYDROCHLORIDE 4 MG: 2 INJECTION, SOLUTION INTRAMUSCULAR; INTRAVENOUS at 02:16

## 2019-10-14 RX ADMIN — PANTOPRAZOLE SODIUM 40 MG: 40 TABLET, DELAYED RELEASE ORAL at 20:51

## 2019-10-14 RX ADMIN — ONDANSETRON HYDROCHLORIDE 4 MG: 2 INJECTION, SOLUTION INTRAMUSCULAR; INTRAVENOUS at 16:31

## 2019-10-14 RX ADMIN — DIPHENHYDRAMINE HYDROCHLORIDE 25 MG: 50 INJECTION, SOLUTION INTRAMUSCULAR; INTRAVENOUS at 02:16

## 2019-10-14 RX ADMIN — HYDROMORPHONE HYDROCHLORIDE 1 MG: 1 INJECTION, SOLUTION INTRAMUSCULAR; INTRAVENOUS; SUBCUTANEOUS at 16:32

## 2019-10-14 RX ADMIN — Medication 10 ML: at 05:57

## 2019-10-14 RX ADMIN — POLYETHYLENE GLYCOL 3350 17 G: 17 POWDER, FOR SOLUTION ORAL at 09:00

## 2019-10-14 RX ADMIN — HYDROMORPHONE HYDROCHLORIDE 1 MG: 1 INJECTION, SOLUTION INTRAMUSCULAR; INTRAVENOUS; SUBCUTANEOUS at 07:35

## 2019-10-14 NOTE — PROGRESS NOTES
Bedside and Verbal shift change report given to Jeanie Lara (oncoming nurse) by Kenneth Ward RN (offgoing nurse). Report included the following information SBAR, Kardex, Intake/Output, MAR, Accordion, Recent Results and Med Rec Status.

## 2019-10-14 NOTE — PROGRESS NOTES
General Daily Progress Note    Admit Date: 10/3/2019    Subjective:     Patient has no complaint . Current Facility-Administered Medications   Medication Dose Route Frequency    hydroxyurea (HYDREA) chemo cap 500 mg  500 mg Oral BID    HYDROmorphone (PF) (DILAUDID) injection 1 mg  1 mg IntraVENous Q4H PRN    lactobac ac& pc-s.therm-b.anim (CARLITOS Q/RISAQUAD)  1 Cap Oral DAILY    0.9% sodium chloride infusion 250 mL  250 mL IntraVENous PRN    polyethylene glycol (MIRALAX) packet 17 g  17 g Oral DAILY    fentaNYL (DURAGESIC) 75 mcg/hr patch 1 Patch  1 Patch TransDERmal Q72H    dextrose 5% - 0.45% NaCl with KCl 30 mEq/L infusion   IntraVENous CONTINUOUS    sodium chloride (NS) flush 5-40 mL  5-40 mL IntraVENous Q8H    sodium chloride (NS) flush 5-40 mL  5-40 mL IntraVENous PRN    naloxone (NARCAN) injection 0.4 mg  0.4 mg IntraVENous PRN    ondansetron (ZOFRAN) injection 4 mg  4 mg IntraVENous Q4H PRN    bisacodyl (DULCOLAX) tablet 5 mg  5 mg Oral DAILY PRN    enoxaparin (LOVENOX) injection 40 mg  40 mg SubCUTAneous Q24H    diphenhydrAMINE (BENADRYL) injection 25 mg  25 mg IntraVENous W3I PRN    folic acid (FOLVITE) tablet 1 mg  1 mg Oral QHS    pantoprazole (PROTONIX) tablet 40 mg  40 mg Oral QHS    citalopram (CELEXA) tablet 10 mg  10 mg Oral DAILY    losartan (COZAAR) tablet 50 mg  50 mg Oral QHS    acetaminophen (TYLENOL) tablet 650 mg  650 mg Oral Q4H PRN        Review of Systems  A comprehensive review of systems was negative. Objective:     Patient Vitals for the past 24 hrs:   BP Temp Pulse Resp SpO2   10/14/19 0749 138/76 98.7 °F (37.1 °C) 84 16 95 %   10/14/19 0224 131/68 98.8 °F (37.1 °C) 83 16 96 %   10/13/19 2257 121/80 99 °F (37.2 °C) 84 16 97 %   10/13/19 1925 136/74 98.2 °F (36.8 °C) 81 16 97 %   10/13/19 1436 115/65 99 °F (37.2 °C) 88 14 94 %   10/13/19 1052 111/58 98.7 °F (37.1 °C) 84 12 99 %     No intake/output data recorded.   10/12 1901 - 10/14 0700  In: 1280 [P.O.:1280]  Out: -     Physical Exam:   Visit Vitals  /76   Pulse 84   Temp 98.7 °F (37.1 °C)   Resp 16   Ht 5' 11\" (1.803 m)   Wt 206 lb 2.1 oz (93.5 kg)   SpO2 95%   BMI 28.75 kg/m²     General appearance: alert, cooperative, no distress, appears stated age  Neck: supple, symmetrical, trachea midline, no adenopathy, thyroid: not enlarged, symmetric, no tenderness/mass/nodules, no carotid bruit and no JVD  Lungs: clear to auscultation bilaterally  Heart: regular rate and rhythm, S1, S2 normal, no murmur, click, rub or gallop  Abdomen: soft, non-tender. Bowel sounds normal. No masses,  no organomegaly  Extremities: extremities normal, atraumatic, no cyanosis or edema        Data Review   Recent Results (from the past 24 hour(s))   CBC WITH AUTOMATED DIFF    Collection Time: 10/14/19  2:40 AM   Result Value Ref Range    WBC 7.0 3.6 - 11.0 K/uL    RBC 2.54 (L) 3.80 - 5.20 M/uL    HGB 8.4 (L) 11.5 - 16.0 g/dL    HCT 25.0 (L) 35.0 - 47.0 %    MCV 98.4 80.0 - 99.0 FL    MCH 33.1 26.0 - 34.0 PG    MCHC 33.6 30.0 - 36.5 g/dL    RDW 15.0 (H) 11.5 - 14.5 %    PLATELET 346 420 - 354 K/uL    MPV 11.2 8.9 - 12.9 FL    NRBC 0.3 (H) 0  WBC    ABSOLUTE NRBC 0.02 (H) 0.00 - 0.01 K/uL    NEUTROPHILS 33 32 - 75 %    LYMPHOCYTES 45 12 - 49 %    MONOCYTES 16 (H) 5 - 13 %    EOSINOPHILS 5 0 - 7 %    BASOPHILS 1 0 - 1 %    IMMATURE GRANULOCYTES 0 0.0 - 0.5 %    ABS. NEUTROPHILS 2.3 1.8 - 8.0 K/UL    ABS. LYMPHOCYTES 3.2 0.8 - 3.5 K/UL    ABS. MONOCYTES 1.1 (H) 0.0 - 1.0 K/UL    ABS. EOSINOPHILS 0.3 0.0 - 0.4 K/UL    ABS. BASOPHILS 0.0 0.0 - 0.1 K/UL    ABS. IMM. GRANS. 0.0 0.00 - 0.04 K/UL    DF AUTOMATED             Assessment:     Active Problems:    Sickle cell pain crisis ([facility] (9/8/2018)        Plan:     1. Gradual improvement with hemoglobin stabilization. We will began to reduce parenteral analgesic. 2.  Respiratory status stable.

## 2019-10-15 LAB
BASOPHILS # BLD: 0 K/UL (ref 0–0.1)
BASOPHILS NFR BLD: 0 % (ref 0–1)
DIFFERENTIAL METHOD BLD: ABNORMAL
EOSINOPHIL # BLD: 0.3 K/UL (ref 0–0.4)
EOSINOPHIL NFR BLD: 5 % (ref 0–7)
ERYTHROCYTE [DISTWIDTH] IN BLOOD BY AUTOMATED COUNT: 14.8 % (ref 11.5–14.5)
HCT VFR BLD AUTO: 24.9 % (ref 35–47)
HGB BLD-MCNC: 8.3 G/DL (ref 11.5–16)
IMM GRANULOCYTES # BLD AUTO: 0 K/UL (ref 0–0.04)
IMM GRANULOCYTES NFR BLD AUTO: 0 % (ref 0–0.5)
LYMPHOCYTES # BLD: 3 K/UL (ref 0.8–3.5)
LYMPHOCYTES NFR BLD: 45 % (ref 12–49)
MCH RBC QN AUTO: 32.9 PG (ref 26–34)
MCHC RBC AUTO-ENTMCNC: 33.3 G/DL (ref 30–36.5)
MCV RBC AUTO: 98.8 FL (ref 80–99)
MONOCYTES # BLD: 1.1 K/UL (ref 0–1)
MONOCYTES NFR BLD: 17 % (ref 5–13)
NEUTS SEG # BLD: 2.3 K/UL (ref 1.8–8)
NEUTS SEG NFR BLD: 33 % (ref 32–75)
NRBC # BLD: 0.02 K/UL (ref 0–0.01)
NRBC BLD-RTO: 0.3 PER 100 WBC
PLATELET # BLD AUTO: 234 K/UL (ref 150–400)
PMV BLD AUTO: 11.4 FL (ref 8.9–12.9)
RBC # BLD AUTO: 2.52 M/UL (ref 3.8–5.2)
WBC # BLD AUTO: 6.8 K/UL (ref 3.6–11)

## 2019-10-15 PROCEDURE — 36415 COLL VENOUS BLD VENIPUNCTURE: CPT

## 2019-10-15 PROCEDURE — 74011000258 HC RX REV CODE- 258: Performed by: INTERNAL MEDICINE

## 2019-10-15 PROCEDURE — 65660000000 HC RM CCU STEPDOWN

## 2019-10-15 PROCEDURE — 74011250637 HC RX REV CODE- 250/637: Performed by: INTERNAL MEDICINE

## 2019-10-15 PROCEDURE — 85025 COMPLETE CBC W/AUTO DIFF WBC: CPT

## 2019-10-15 PROCEDURE — 94760 N-INVAS EAR/PLS OXIMETRY 1: CPT

## 2019-10-15 PROCEDURE — 74011250636 HC RX REV CODE- 250/636: Performed by: INTERNAL MEDICINE

## 2019-10-15 PROCEDURE — 77010033678 HC OXYGEN DAILY

## 2019-10-15 RX ORDER — HYDROCODONE BITARTRATE AND ACETAMINOPHEN 10; 325 MG/1; MG/1
1 TABLET ORAL
Status: DISCONTINUED | OUTPATIENT
Start: 2019-10-15 | End: 2019-10-17 | Stop reason: HOSPADM

## 2019-10-15 RX ORDER — HYDROMORPHONE HYDROCHLORIDE 1 MG/ML
1 INJECTION, SOLUTION INTRAMUSCULAR; INTRAVENOUS; SUBCUTANEOUS
Status: DISCONTINUED | OUTPATIENT
Start: 2019-10-15 | End: 2019-10-16

## 2019-10-15 RX ADMIN — HYDROXYUREA 500 MG: 500 CAPSULE ORAL at 16:43

## 2019-10-15 RX ADMIN — Medication 10 ML: at 06:00

## 2019-10-15 RX ADMIN — CITALOPRAM HYDROBROMIDE 10 MG: 20 TABLET, FILM COATED ORAL at 09:03

## 2019-10-15 RX ADMIN — FOLIC ACID 1 MG: 1 TABLET ORAL at 21:00

## 2019-10-15 RX ADMIN — HYDROMORPHONE HYDROCHLORIDE 1 MG: 1 INJECTION, SOLUTION INTRAMUSCULAR; INTRAVENOUS; SUBCUTANEOUS at 00:47

## 2019-10-15 RX ADMIN — HYDROXYUREA 500 MG: 500 CAPSULE ORAL at 09:12

## 2019-10-15 RX ADMIN — ONDANSETRON HYDROCHLORIDE 4 MG: 2 INJECTION, SOLUTION INTRAMUSCULAR; INTRAVENOUS at 10:50

## 2019-10-15 RX ADMIN — ONDANSETRON HYDROCHLORIDE 4 MG: 2 INJECTION, SOLUTION INTRAMUSCULAR; INTRAVENOUS at 00:47

## 2019-10-15 RX ADMIN — ENOXAPARIN SODIUM 40 MG: 40 INJECTION, SOLUTION INTRAVENOUS; SUBCUTANEOUS at 06:12

## 2019-10-15 RX ADMIN — HYDROMORPHONE HYDROCHLORIDE 1 MG: 1 INJECTION, SOLUTION INTRAMUSCULAR; INTRAVENOUS; SUBCUTANEOUS at 06:07

## 2019-10-15 RX ADMIN — Medication 5 ML: at 14:00

## 2019-10-15 RX ADMIN — DIPHENHYDRAMINE HYDROCHLORIDE 25 MG: 50 INJECTION, SOLUTION INTRAMUSCULAR; INTRAVENOUS at 06:07

## 2019-10-15 RX ADMIN — POLYETHYLENE GLYCOL 3350 17 G: 17 POWDER, FOR SOLUTION ORAL at 09:04

## 2019-10-15 RX ADMIN — PANTOPRAZOLE SODIUM 40 MG: 40 TABLET, DELAYED RELEASE ORAL at 21:00

## 2019-10-15 RX ADMIN — LOSARTAN POTASSIUM 50 MG: 50 TABLET, FILM COATED ORAL at 21:00

## 2019-10-15 RX ADMIN — HYDROCODONE BITARTRATE AND ACETAMINOPHEN 1 TABLET: 10; 325 TABLET ORAL at 14:39

## 2019-10-15 RX ADMIN — DIPHENHYDRAMINE HYDROCHLORIDE 25 MG: 50 INJECTION, SOLUTION INTRAMUSCULAR; INTRAVENOUS at 22:55

## 2019-10-15 RX ADMIN — ONDANSETRON HYDROCHLORIDE 4 MG: 2 INJECTION, SOLUTION INTRAMUSCULAR; INTRAVENOUS at 22:55

## 2019-10-15 RX ADMIN — POTASSIUM CHLORIDE: 2 INJECTION, SOLUTION, CONCENTRATE INTRAVENOUS at 20:44

## 2019-10-15 RX ADMIN — ONDANSETRON HYDROCHLORIDE 4 MG: 2 INJECTION, SOLUTION INTRAMUSCULAR; INTRAVENOUS at 16:43

## 2019-10-15 RX ADMIN — ONDANSETRON HYDROCHLORIDE 4 MG: 2 INJECTION, SOLUTION INTRAMUSCULAR; INTRAVENOUS at 06:07

## 2019-10-15 RX ADMIN — DIPHENHYDRAMINE HYDROCHLORIDE 25 MG: 50 INJECTION, SOLUTION INTRAMUSCULAR; INTRAVENOUS at 14:43

## 2019-10-15 RX ADMIN — HYDROCODONE BITARTRATE AND ACETAMINOPHEN 1 TABLET: 10; 325 TABLET ORAL at 22:55

## 2019-10-15 RX ADMIN — Medication 1 CAPSULE: at 09:03

## 2019-10-15 RX ADMIN — Medication 10 ML: at 22:56

## 2019-10-15 NOTE — PROGRESS NOTES
Goals Addressed                 This Visit's Progress     Facilitate transitions of care (ie. palliative care, assisted living, nursing home, changes in living arrangements). On track     9/19/2019:  Consult w/ PCP for Ambulatory Pre D/C Planning. Consult done w/ Inpatient KELVIN Rodgers re d/c plans/concerns of patient; none specific-depression screening. NN spoke with Charge Nurse [address]; states depression screen is done under the admissions database questionaire;  Review of questionairre done: specifically suicide in nature. NN contacted patient for ambulatory pre d/c concerns re medication as previously experienced with [facility] . Patient denied any forseen concerns or problems w/ the needed pain medications or living arrangement. Depression screening done from the 2 PQ screening. Patient denied depression; began telling how she has a daughter and grandchildren here in town that is the center of her debra, and another one on the way within months. States also son & wife who lives in [place] is also expecting a new baby which she is looking forward to going down the first of Nov.  Patient states she has too much going on to be depressed to the point of not wanting to do anything; states the depression she experiences is when she is in \"this hospital and can't get out. .. \". Patient states she sees her parents who are still living and doing well;  States she has lots of girls friends and they do a lot together--which she is missing out on. Patient teach-back done on the new medication: hydroxyurea. As previously informed, patient again states all the side effects she heard about, that when it first was offered her at [facility], it was primarily used for chemotherapy with multiple side effects but states now having tried everything else, and her PCP was comfortable in her trying; she states why not. .her son & daughter agrees with her trying it out to see how it is tolerated.   Discussed the known side effects the hydroxyurea may bring, but she is willing to give it a try. Patient sounding upbeat and stating she is ready to go home; that she has a lot to look forward to. Patient further discussed weight loss plan ( as PCP had advised during office visits):  States she will give up drinking sodas-replacing with water and less eating out/buying prepared foods and begin to eat more healthy in light of \"foods that build Vit B levels in trying to work with this new medicine\". NN will continue to f/u post d/c.   EW     10/12/2019: Natali Lara 9/6-9/20/2019:  Sickle Cell Crisis Pain-Episode Closure  Call received from patient inquiring of PCP's visit. Remains c/o sickle cell pain but states getting better-no chest pain as bad. Denied immediate need for seeing Attending-states has been seen by Dr. Ryan Watkins. Patient is now readmitted for SS Crisis, Lower Back Pain & Chest Discomfort. Will close & resolve  previous KRISTINA episode for CTN KRISTINA management. EW          goal completion. Today.

## 2019-10-15 NOTE — PROGRESS NOTES
General Daily Progress Note    Admit Date: 10/3/2019    Subjective:     Patient has no complaint . Current Facility-Administered Medications   Medication Dose Route Frequency    HYDROmorphone (PF) (DILAUDID) injection 1 mg  1 mg IntraVENous Q8H PRN    HYDROcodone-acetaminophen (NORCO)  mg tablet 1 Tab  1 Tab Oral Q6H PRN    hydroxyurea (HYDREA) chemo cap 500 mg  500 mg Oral BID    lactobac ac& pc-s.therm-b.anim (CARLITOS Q/RISAQUAD)  1 Cap Oral DAILY    0.9% sodium chloride infusion 250 mL  250 mL IntraVENous PRN    polyethylene glycol (MIRALAX) packet 17 g  17 g Oral DAILY    fentaNYL (DURAGESIC) 75 mcg/hr patch 1 Patch  1 Patch TransDERmal Q72H    dextrose 5% - 0.45% NaCl with KCl 30 mEq/L infusion   IntraVENous CONTINUOUS    sodium chloride (NS) flush 5-40 mL  5-40 mL IntraVENous Q8H    sodium chloride (NS) flush 5-40 mL  5-40 mL IntraVENous PRN    naloxone (NARCAN) injection 0.4 mg  0.4 mg IntraVENous PRN    ondansetron (ZOFRAN) injection 4 mg  4 mg IntraVENous Q4H PRN    bisacodyl (DULCOLAX) tablet 5 mg  5 mg Oral DAILY PRN    enoxaparin (LOVENOX) injection 40 mg  40 mg SubCUTAneous Q24H    diphenhydrAMINE (BENADRYL) injection 25 mg  25 mg IntraVENous K4B PRN    folic acid (FOLVITE) tablet 1 mg  1 mg Oral QHS    pantoprazole (PROTONIX) tablet 40 mg  40 mg Oral QHS    citalopram (CELEXA) tablet 10 mg  10 mg Oral DAILY    losartan (COZAAR) tablet 50 mg  50 mg Oral QHS    acetaminophen (TYLENOL) tablet 650 mg  650 mg Oral Q4H PRN        Review of Systems  A comprehensive review of systems was negative.     Objective:     Patient Vitals for the past 24 hrs:   BP Temp Pulse Resp SpO2   10/15/19 0743 127/76 98.3 °F (36.8 °C) 80 16 93 %   10/15/19 0353 114/67 98.3 °F (36.8 °C) 75 18 99 %   10/14/19 2344 136/78 97.9 °F (36.6 °C) 81 17 94 %   10/14/19 1956 129/70 99.4 °F (37.4 °C) 80 17 93 %   10/14/19 1545 (!) 109/91 98.4 °F (36.9 °C) 78 16 98 %   10/14/19 1124 112/64 98.7 °F (37.1 °C) 78 16 96 %     10/15 0701 - 10/15 1900  In: 360 [P.O.:360]  Out: -   10/13 1901 - 10/15 0700  In: 560 [P.O.:560]  Out: -     Physical Exam:   Visit Vitals  /76 (BP 1 Location: Left arm, BP Patient Position: At rest)   Pulse 80   Temp 98.3 °F (36.8 °C)   Resp 16   Ht 5' 11\" (1.803 m)   Wt 206 lb 2.1 oz (93.5 kg)   SpO2 93%   BMI 28.75 kg/m²     General appearance: alert, cooperative, no distress, appears stated age  Neck: supple, symmetrical, trachea midline, no adenopathy, thyroid: not enlarged, symmetric, no tenderness/mass/nodules, no carotid bruit and no JVD  Lungs: clear to auscultation bilaterally  Heart: regular rate and rhythm, S1, S2 normal, no murmur, click, rub or gallop  Abdomen: soft, non-tender. Bowel sounds normal. No masses,  no organomegaly  Extremities: extremities normal, atraumatic, no cyanosis or edema        Data Review   Recent Results (from the past 24 hour(s))   CBC WITH AUTOMATED DIFF    Collection Time: 10/15/19  6:16 AM   Result Value Ref Range    WBC 6.8 3.6 - 11.0 K/uL    RBC 2.52 (L) 3.80 - 5.20 M/uL    HGB 8.3 (L) 11.5 - 16.0 g/dL    HCT 24.9 (L) 35.0 - 47.0 %    MCV 98.8 80.0 - 99.0 FL    MCH 32.9 26.0 - 34.0 PG    MCHC 33.3 30.0 - 36.5 g/dL    RDW 14.8 (H) 11.5 - 14.5 %    PLATELET 324 580 - 718 K/uL    MPV 11.4 8.9 - 12.9 FL    NRBC 0.3 (H) 0  WBC    ABSOLUTE NRBC 0.02 (H) 0.00 - 0.01 K/uL    NEUTROPHILS 33 32 - 75 %    LYMPHOCYTES 45 12 - 49 %    MONOCYTES 17 (H) 5 - 13 %    EOSINOPHILS 5 0 - 7 %    BASOPHILS 0 0 - 1 %    IMMATURE GRANULOCYTES 0 0.0 - 0.5 %    ABS. NEUTROPHILS 2.3 1.8 - 8.0 K/UL    ABS. LYMPHOCYTES 3.0 0.8 - 3.5 K/UL    ABS. MONOCYTES 1.1 (H) 0.0 - 1.0 K/UL    ABS. EOSINOPHILS 0.3 0.0 - 0.4 K/UL    ABS. BASOPHILS 0.0 0.0 - 0.1 K/UL    ABS. IMM. GRANS. 0.0 0.00 - 0.04 K/UL    DF AUTOMATED             Assessment:     Active Problems:    [facility] (9/8/2018)        Plan: 1. Will cont to reduce parenteral analgesics.   2.Anticipate d/c on Thur if all goes well.

## 2019-10-15 NOTE — PROGRESS NOTES
Episode resolved. Goals to be marked as completed. Patient listed on Complex CM active List.  Will f/u post CTN KRISTINA follow completed. Case closed.

## 2019-10-15 NOTE — PROGRESS NOTES
Bedside and Verbal shift change report given to Deberah Oppenheim (oncoming nurse) by Eric Mathews (offgoing nurse). Report included the following information SBAR and Kardex.

## 2019-10-15 NOTE — PROGRESS NOTES
[facility] note today indicated DC 10/17/19 if all goes well. Plan is home with family. Family to transport home in car. Pt will need PCP appt scheduled. Second IM letter delivered today. Medicare pt has received, reviewed, and signed 2nd IM letter informing them of their right to appeal the discharge. Signed copy has been placed on pt bedside chart. 3:07 PM   CM did talk to Pt about trying to make sure that family can come get Pt around 10 AM.  CM did talk to her about discharge lounge if her ride could not come until the afternoon. She is going to call and see what they can do. CM will continue to monitor discharge plan.      Anuel [place]  Ext [phone]

## 2019-10-16 LAB
ANION GAP SERPL CALC-SCNC: 3 MMOL/L (ref 5–15)
BASOPHILS # BLD: 0.1 K/UL (ref 0–0.1)
BASOPHILS NFR BLD: 1 % (ref 0–1)
BUN SERPL-MCNC: 13 MG/DL (ref 6–20)
BUN/CREAT SERPL: 18 (ref 12–20)
CALCIUM SERPL-MCNC: 8.6 MG/DL (ref 8.5–10.1)
CHLORIDE SERPL-SCNC: 103 MMOL/L (ref 97–108)
CO2 SERPL-SCNC: 31 MMOL/L (ref 21–32)
CREAT SERPL-MCNC: 0.74 MG/DL (ref 0.55–1.02)
DIFFERENTIAL METHOD BLD: ABNORMAL
EOSINOPHIL # BLD: 0.4 K/UL (ref 0–0.4)
EOSINOPHIL NFR BLD: 6 % (ref 0–7)
ERYTHROCYTE [DISTWIDTH] IN BLOOD BY AUTOMATED COUNT: 14.6 % (ref 11.5–14.5)
GLUCOSE SERPL-MCNC: 109 MG/DL (ref 65–100)
HCT VFR BLD AUTO: 24.2 % (ref 35–47)
HGB BLD-MCNC: 8.4 G/DL (ref 11.5–16)
IMM GRANULOCYTES # BLD AUTO: 0 K/UL (ref 0–0.04)
IMM GRANULOCYTES NFR BLD AUTO: 0 % (ref 0–0.5)
LYMPHOCYTES # BLD: 2.9 K/UL (ref 0.8–3.5)
LYMPHOCYTES NFR BLD: 46 % (ref 12–49)
MCH RBC QN AUTO: 33.7 PG (ref 26–34)
MCHC RBC AUTO-ENTMCNC: 34.7 G/DL (ref 30–36.5)
MCV RBC AUTO: 97.2 FL (ref 80–99)
MONOCYTES # BLD: 1 K/UL (ref 0–1)
MONOCYTES NFR BLD: 16 % (ref 5–13)
NEUTS SEG # BLD: 1.9 K/UL (ref 1.8–8)
NEUTS SEG NFR BLD: 31 % (ref 32–75)
NRBC # BLD: 0.02 K/UL (ref 0–0.01)
NRBC BLD-RTO: 0.3 PER 100 WBC
PLATELET # BLD AUTO: 246 K/UL (ref 150–400)
PMV BLD AUTO: 11.3 FL (ref 8.9–12.9)
POTASSIUM SERPL-SCNC: 4 MMOL/L (ref 3.5–5.1)
RBC # BLD AUTO: 2.49 M/UL (ref 3.8–5.2)
SODIUM SERPL-SCNC: 137 MMOL/L (ref 136–145)
WBC # BLD AUTO: 6.2 K/UL (ref 3.6–11)

## 2019-10-16 PROCEDURE — 80048 BASIC METABOLIC PNL TOTAL CA: CPT

## 2019-10-16 PROCEDURE — 94760 N-INVAS EAR/PLS OXIMETRY 1: CPT

## 2019-10-16 PROCEDURE — 85025 COMPLETE CBC W/AUTO DIFF WBC: CPT

## 2019-10-16 PROCEDURE — 65660000000 HC RM CCU STEPDOWN

## 2019-10-16 PROCEDURE — 36415 COLL VENOUS BLD VENIPUNCTURE: CPT

## 2019-10-16 PROCEDURE — 74011250636 HC RX REV CODE- 250/636: Performed by: INTERNAL MEDICINE

## 2019-10-16 PROCEDURE — 74011250637 HC RX REV CODE- 250/637: Performed by: INTERNAL MEDICINE

## 2019-10-16 PROCEDURE — 77010033678 HC OXYGEN DAILY

## 2019-10-16 RX ADMIN — HYDROCODONE BITARTRATE AND ACETAMINOPHEN 1 TABLET: 10; 325 TABLET ORAL at 17:39

## 2019-10-16 RX ADMIN — DIPHENHYDRAMINE HYDROCHLORIDE 25 MG: 50 INJECTION, SOLUTION INTRAMUSCULAR; INTRAVENOUS at 05:52

## 2019-10-16 RX ADMIN — Medication 10 ML: at 17:39

## 2019-10-16 RX ADMIN — CITALOPRAM HYDROBROMIDE 10 MG: 20 TABLET, FILM COATED ORAL at 09:12

## 2019-10-16 RX ADMIN — HYDROCODONE BITARTRATE AND ACETAMINOPHEN 1 TABLET: 10; 325 TABLET ORAL at 12:11

## 2019-10-16 RX ADMIN — HYDROCODONE BITARTRATE AND ACETAMINOPHEN 1 TABLET: 10; 325 TABLET ORAL at 05:52

## 2019-10-16 RX ADMIN — DIPHENHYDRAMINE HYDROCHLORIDE 25 MG: 50 INJECTION, SOLUTION INTRAMUSCULAR; INTRAVENOUS at 23:32

## 2019-10-16 RX ADMIN — ONDANSETRON HYDROCHLORIDE 4 MG: 2 INJECTION, SOLUTION INTRAMUSCULAR; INTRAVENOUS at 23:32

## 2019-10-16 RX ADMIN — DIPHENHYDRAMINE HYDROCHLORIDE 25 MG: 50 INJECTION, SOLUTION INTRAMUSCULAR; INTRAVENOUS at 17:39

## 2019-10-16 RX ADMIN — POLYETHYLENE GLYCOL 3350 17 G: 17 POWDER, FOR SOLUTION ORAL at 09:12

## 2019-10-16 RX ADMIN — LOSARTAN POTASSIUM 50 MG: 50 TABLET, FILM COATED ORAL at 21:34

## 2019-10-16 RX ADMIN — DIPHENHYDRAMINE HYDROCHLORIDE 25 MG: 50 INJECTION, SOLUTION INTRAMUSCULAR; INTRAVENOUS at 12:10

## 2019-10-16 RX ADMIN — ENOXAPARIN SODIUM 40 MG: 40 INJECTION, SOLUTION INTRAVENOUS; SUBCUTANEOUS at 05:52

## 2019-10-16 RX ADMIN — Medication 1 CAPSULE: at 09:12

## 2019-10-16 RX ADMIN — ONDANSETRON HYDROCHLORIDE 4 MG: 2 INJECTION, SOLUTION INTRAMUSCULAR; INTRAVENOUS at 05:52

## 2019-10-16 RX ADMIN — HYDROXYUREA 500 MG: 500 CAPSULE ORAL at 17:51

## 2019-10-16 RX ADMIN — FOLIC ACID 1 MG: 1 TABLET ORAL at 21:34

## 2019-10-16 RX ADMIN — PANTOPRAZOLE SODIUM 40 MG: 40 TABLET, DELAYED RELEASE ORAL at 21:34

## 2019-10-16 RX ADMIN — ONDANSETRON HYDROCHLORIDE 4 MG: 2 INJECTION, SOLUTION INTRAMUSCULAR; INTRAVENOUS at 17:39

## 2019-10-16 RX ADMIN — Medication 10 ML: at 21:35

## 2019-10-16 RX ADMIN — HYDROCODONE BITARTRATE AND ACETAMINOPHEN 1 TABLET: 10; 325 TABLET ORAL at 23:32

## 2019-10-16 RX ADMIN — ONDANSETRON HYDROCHLORIDE 4 MG: 2 INJECTION, SOLUTION INTRAMUSCULAR; INTRAVENOUS at 12:25

## 2019-10-16 RX ADMIN — Medication 10 ML: at 05:53

## 2019-10-16 RX ADMIN — Medication 20 ML: at 12:25

## 2019-10-16 RX ADMIN — HYDROXYUREA 500 MG: 500 CAPSULE ORAL at 12:45

## 2019-10-16 NOTE — PROGRESS NOTES
Problem: Pain  Goal: *Control of Pain  Outcome: Progressing Towards Goal  Note:   Patient tapering off IV pain medication, still requiring PO pain regimen  Goal: *PALLIATIVE CARE:  Alleviation of Pain  Outcome: Progressing Towards Goal  Note:   Patient continues to need pain medication with some relief.

## 2019-10-16 NOTE — PROGRESS NOTES
General Daily Progress Note    Admit Date: 10/3/2019    Subjective:     Patient has no complaint . Current Facility-Administered Medications   Medication Dose Route Frequency    HYDROcodone-acetaminophen (NORCO)  mg tablet 1 Tab  1 Tab Oral Q6H PRN    hydroxyurea (HYDREA) chemo cap 500 mg  500 mg Oral BID    lactobac ac& pc-s.therm-b.anim (CARLITOS Q/RISAQUAD)  1 Cap Oral DAILY    0.9% sodium chloride infusion 250 mL  250 mL IntraVENous PRN    polyethylene glycol (MIRALAX) packet 17 g  17 g Oral DAILY    fentaNYL (DURAGESIC) 75 mcg/hr patch 1 Patch  1 Patch TransDERmal Q72H    sodium chloride (NS) flush 5-40 mL  5-40 mL IntraVENous Q8H    sodium chloride (NS) flush 5-40 mL  5-40 mL IntraVENous PRN    naloxone (NARCAN) injection 0.4 mg  0.4 mg IntraVENous PRN    ondansetron (ZOFRAN) injection 4 mg  4 mg IntraVENous Q4H PRN    bisacodyl (DULCOLAX) tablet 5 mg  5 mg Oral DAILY PRN    enoxaparin (LOVENOX) injection 40 mg  40 mg SubCUTAneous Q24H    diphenhydrAMINE (BENADRYL) injection 25 mg  25 mg IntraVENous J6R PRN    folic acid (FOLVITE) tablet 1 mg  1 mg Oral QHS    pantoprazole (PROTONIX) tablet 40 mg  40 mg Oral QHS    citalopram (CELEXA) tablet 10 mg  10 mg Oral DAILY    losartan (COZAAR) tablet 50 mg  50 mg Oral QHS    acetaminophen (TYLENOL) tablet 650 mg  650 mg Oral Q4H PRN        Review of Systems  A comprehensive review of systems was negative. Objective:     Patient Vitals for the past 24 hrs:   BP Temp Pulse Resp SpO2   10/16/19 0805 125/81 98.4 °F (36.9 °C) 78 18 96 %   10/16/19 0328 124/79 98.1 °F (36.7 °C) 73 17 97 %   10/15/19 2300 131/88 97.9 °F (36.6 °C) 81 17 97 %   10/15/19 1939 134/82 98.6 °F (37 °C) 80 16 99 %   10/15/19 1428 111/60 98.4 °F (36.9 °C) 82 14 95 %   10/15/19 1147 116/73 98.6 °F (37 °C) 74 16 99 %     No intake/output data recorded.   10/14 1901 - 10/16 0700  In: 735 [P.O.:735]  Out: -     Physical Exam:   Visit Vitals  /81 (BP 1 Location: Left arm, BP Patient Position: Sitting)   Pulse 78   Temp 98.4 °F (36.9 °C)   Resp 18   Ht 5' 11\" (1.803 m)   Wt 206 lb 2.1 oz (93.5 kg)   SpO2 96%   BMI 28.75 kg/m²     General appearance: alert, cooperative, no distress, appears stated age  Neck: supple, symmetrical, trachea midline, no adenopathy, thyroid: not enlarged, symmetric, no tenderness/mass/nodules, no carotid bruit and no JVD  Lungs: clear to auscultation bilaterally  Heart: regular rate and rhythm, S1, S2 normal, no murmur, click, rub or gallop  Abdomen: soft, non-tender. Bowel sounds normal. No masses,  no organomegaly  Extremities: extremities normal, atraumatic, no cyanosis or edema        Data Review   Recent Results (from the past 24 hour(s))   CBC WITH AUTOMATED DIFF    Collection Time: 10/16/19  5:59 AM   Result Value Ref Range    WBC 6.2 3.6 - 11.0 K/uL    RBC 2.49 (L) 3.80 - 5.20 M/uL    HGB 8.4 (L) 11.5 - 16.0 g/dL    HCT 24.2 (L) 35.0 - 47.0 %    MCV 97.2 80.0 - 99.0 FL    MCH 33.7 26.0 - 34.0 PG    MCHC 34.7 30.0 - 36.5 g/dL    RDW 14.6 (H) 11.5 - 14.5 %    PLATELET 537 301 - 976 K/uL    MPV 11.3 8.9 - 12.9 FL    NRBC 0.3 (H) 0  WBC    ABSOLUTE NRBC 0.02 (H) 0.00 - 0.01 K/uL    NEUTROPHILS 31 (L) 32 - 75 %    LYMPHOCYTES 46 12 - 49 %    MONOCYTES 16 (H) 5 - 13 %    EOSINOPHILS 6 0 - 7 %    BASOPHILS 1 0 - 1 %    IMMATURE GRANULOCYTES 0 0.0 - 0.5 %    ABS. NEUTROPHILS 1.9 1.8 - 8.0 K/UL    ABS. LYMPHOCYTES 2.9 0.8 - 3.5 K/UL    ABS. MONOCYTES 1.0 0.0 - 1.0 K/UL    ABS. EOSINOPHILS 0.4 0.0 - 0.4 K/UL    ABS. BASOPHILS 0.1 0.0 - 0.1 K/UL    ABS. IMM.  GRANS. 0.0 0.00 - 0.04 K/UL    DF AUTOMATED     METABOLIC PANEL, BASIC    Collection Time: 10/16/19  5:59 AM   Result Value Ref Range    Sodium 137 136 - 145 mmol/L    Potassium 4.0 3.5 - 5.1 mmol/L    Chloride 103 97 - 108 mmol/L    CO2 31 21 - 32 mmol/L    Anion gap 3 (L) 5 - 15 mmol/L    Glucose 109 (H) 65 - 100 mg/dL    BUN 13 6 - 20 MG/DL    Creatinine 0.74 0.55 - 1.02 MG/DL    BUN/Creatinine ratio 18 12 - 20      GFR est AA >60 >60 ml/min/1.73m2    GFR est non-AA >60 >60 ml/min/1.73m2    Calcium 8.6 8.5 - 10.1 MG/DL           Assessment:     Active Problems:    Sickle cell pain crisis ([facility] (9/8/2018)        Plan:     1. Parenteral analgesics discontinued. Assuming all goes well we will discharge tomorrow. 2.  Tolerating hydroxyurea reasonably well. 3.  Will mobilize.

## 2019-10-16 NOTE — PROGRESS NOTES
Bedside shift change report given to [address] (oncoming nurse) by Shiv Culver RN (offgoing nurse). Report included the following information SBAR, Kardex and MAR.

## 2019-10-16 NOTE — PROGRESS NOTES
Bedside and Verbal shift change report given to Senia Garcia (oncoming nurse) by Danita Elizondo (offgoing nurse). Report included the following information SBAR, Kardex, Intake/Output, MAR and Recent Results.

## 2019-10-17 VITALS
HEART RATE: 75 BPM | DIASTOLIC BLOOD PRESSURE: 64 MMHG | TEMPERATURE: 98.9 F | BODY MASS INDEX: 28.86 KG/M2 | WEIGHT: 206.13 LBS | OXYGEN SATURATION: 96 % | SYSTOLIC BLOOD PRESSURE: 106 MMHG | RESPIRATION RATE: 18 BRPM | HEIGHT: 71 IN

## 2019-10-17 LAB
BASOPHILS # BLD: 0.1 K/UL (ref 0–0.1)
BASOPHILS NFR BLD: 1 % (ref 0–1)
BLASTS NFR BLD MANUAL: 0 %
DIFFERENTIAL METHOD BLD: ABNORMAL
EOSINOPHIL # BLD: 0.3 K/UL (ref 0–0.4)
EOSINOPHIL NFR BLD: 5 % (ref 0–7)
ERYTHROCYTE [DISTWIDTH] IN BLOOD BY AUTOMATED COUNT: 14.6 % (ref 11.5–14.5)
HCT VFR BLD AUTO: 24.4 % (ref 35–47)
HGB BLD-MCNC: 8.2 G/DL (ref 11.5–16)
IMM GRANULOCYTES # BLD AUTO: 0 K/UL
IMM GRANULOCYTES NFR BLD AUTO: 0 %
LYMPHOCYTES # BLD: 4.2 K/UL (ref 0.8–3.5)
LYMPHOCYTES NFR BLD: 62 % (ref 12–49)
MCH RBC QN AUTO: 33.1 PG (ref 26–34)
MCHC RBC AUTO-ENTMCNC: 33.6 G/DL (ref 30–36.5)
MCV RBC AUTO: 98.4 FL (ref 80–99)
METAMYELOCYTES NFR BLD MANUAL: 0 %
MONOCYTES # BLD: 0.6 K/UL (ref 0–1)
MONOCYTES NFR BLD: 9 % (ref 5–13)
MYELOCYTES NFR BLD MANUAL: 0 %
NEUTS BAND NFR BLD MANUAL: 0 % (ref 0–6)
NEUTS SEG # BLD: 1.5 K/UL (ref 1.8–8)
NEUTS SEG NFR BLD: 23 % (ref 32–75)
NRBC # BLD: 0.02 K/UL (ref 0–0.01)
NRBC BLD-RTO: 0.3 PER 100 WBC
OTHER CELLS NFR BLD MANUAL: 0 %
PLATELET # BLD AUTO: 271 K/UL (ref 150–400)
PMV BLD AUTO: 11 FL (ref 8.9–12.9)
PROMYELOCYTES NFR BLD MANUAL: 0 %
RBC # BLD AUTO: 2.48 M/UL (ref 3.8–5.2)
RBC MORPH BLD: ABNORMAL
WBC # BLD AUTO: 6.7 K/UL (ref 3.6–11)

## 2019-10-17 PROCEDURE — 85027 COMPLETE CBC AUTOMATED: CPT

## 2019-10-17 PROCEDURE — 74011250636 HC RX REV CODE- 250/636: Performed by: INTERNAL MEDICINE

## 2019-10-17 PROCEDURE — 74011250637 HC RX REV CODE- 250/637: Performed by: INTERNAL MEDICINE

## 2019-10-17 PROCEDURE — 77010033678 HC OXYGEN DAILY

## 2019-10-17 PROCEDURE — 36415 COLL VENOUS BLD VENIPUNCTURE: CPT

## 2019-10-17 PROCEDURE — 94760 N-INVAS EAR/PLS OXIMETRY 1: CPT

## 2019-10-17 RX ORDER — HYDROCODONE BITARTRATE AND ACETAMINOPHEN 10; 325 MG/1; MG/1
1 TABLET ORAL
Qty: 120 TAB | Refills: 0 | Status: SHIPPED | OUTPATIENT
Start: 2019-10-17 | End: 2019-11-14 | Stop reason: SDUPTHER

## 2019-10-17 RX ORDER — HYDROXYUREA 500 MG/1
CAPSULE ORAL
Qty: 60 CAP | Refills: 11 | Status: SHIPPED | OUTPATIENT
Start: 2019-10-17 | End: 2020-10-31

## 2019-10-17 RX ADMIN — ONDANSETRON HYDROCHLORIDE 4 MG: 2 INJECTION, SOLUTION INTRAMUSCULAR; INTRAVENOUS at 05:39

## 2019-10-17 RX ADMIN — HYDROCODONE BITARTRATE AND ACETAMINOPHEN 1 TABLET: 10; 325 TABLET ORAL at 05:39

## 2019-10-17 RX ADMIN — ENOXAPARIN SODIUM 40 MG: 40 INJECTION, SOLUTION INTRAVENOUS; SUBCUTANEOUS at 05:39

## 2019-10-17 RX ADMIN — DIPHENHYDRAMINE HYDROCHLORIDE 25 MG: 50 INJECTION, SOLUTION INTRAMUSCULAR; INTRAVENOUS at 05:39

## 2019-10-17 RX ADMIN — CITALOPRAM HYDROBROMIDE 10 MG: 20 TABLET, FILM COATED ORAL at 09:00

## 2019-10-17 RX ADMIN — Medication 1 CAPSULE: at 09:00

## 2019-10-17 RX ADMIN — HYDROXYUREA 500 MG: 500 CAPSULE ORAL at 08:23

## 2019-10-17 RX ADMIN — Medication 10 ML: at 05:39

## 2019-10-17 NOTE — PROGRESS NOTES
Problem: Falls - Risk of  Goal: *Absence of Falls  Description  Document Dede Patches Fall Risk and appropriate interventions in the flowsheet. Outcome: Progressing Towards Goal  Note:   Fall Risk Interventions:       Mentation Interventions: Adequate sleep, hydration, pain control, Door open when patient unattended    Medication Interventions: Evaluate medications/consider consulting pharmacy, Patient to call before getting OOB    Elimination Interventions: Call light in reach    History of Falls Interventions: Door open when patient unattended, Evaluate medications/consider consulting pharmacy         Problem: Patient Education: Go to Patient Education Activity  Goal: Patient/Family Education  Outcome: Progressing Towards Goal     Problem: Pain  Goal: *Control of Pain  Outcome: Progressing Towards Goal  Goal: *PALLIATIVE CARE:  Alleviation of Pain  Outcome: Progressing Towards Goal     Problem: Patient Education: Go to Patient Education Activity  Goal: Patient/Family Education  Outcome: Progressing Towards Goal     Problem: Pressure Injury - Risk of  Goal: *Prevention of pressure injury  Description  Document Nuno Scale and appropriate interventions in the flowsheet.   Outcome: Progressing Towards Goal  Note:   Pressure Injury Interventions:  Sensory Interventions: Check visual cues for pain, Keep linens dry and wrinkle-free, Maintain/enhance activity level, Minimize linen layers, Monitor skin under medical devices    Moisture Interventions: Minimize layers    Activity Interventions: Increase time out of bed, Pressure redistribution bed/mattress(bed type)    Mobility Interventions: HOB 30 degrees or less, Pressure redistribution bed/mattress (bed type)    Nutrition Interventions: Document food/fluid/supplement intake                     Problem: Patient Education: Go to Patient Education Activity  Goal: Patient/Family Education  Outcome: Progressing Towards Goal

## 2019-10-17 NOTE — PROGRESS NOTES
KRISTINA Plan:     Plan is home with family. Family to transport home in car. . Lance Reddy will be here at 10 AM  CM emailed CM specialist to make PCP appt   Second IM letter delivered 10/15/19. No further CM needs.      Anuel [place]  Ext [phone]

## 2019-10-17 NOTE — DISCHARGE SUMMARY
General Discharge Instructions    Patient ID:  Viola Gardner  [id number]  64 y.o.  1960    Patient Instructions          The following personal items were collected during your admission and were returned to you. Take Home Medications     Current Discharge Medication List      CONTINUE these medications which have CHANGED    Details   HYDROcodone-acetaminophen (NORCO)  mg tablet Take 1 Tab by mouth every six (6) hours as needed for Pain for up to 30 days. Max Daily Amount: 4 Tabs. 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 Max Daily Amount: 1 Patch. Qty: 10 Patch, Refills: 0    Associated Diagnoses: Sickle cell crisis (HCC)      telmisartan (MICARDIS) 40 mg tablet Take 1 Tab by mouth nightly. Qty: 30 Tab, Refills: 11      folic acid (FOLVITE) 1 mg tablet Take 1 mg by mouth nightly. citalopram (CELEXA) 10 mg tablet TAKE 1 TABLET BY MOUTH EVERY NIGHT AT BEDTIME  Qty: 90 Tab, Refills: 3    Associated Diagnoses: Depression, unspecified depression type      hydrocortisone (CORTISONE) 1 % topical cream Apply  to affected area two (2) times daily as needed for Skin Irritation. use thin layer  Qty: 30 g, Refills: 11    Associated Diagnoses: Dermatitis      omeprazole (PRILOSEC OTC) 20 mg tablet Take 20 mg by mouth nightly. promethazine (PHENERGAN) 25 mg tablet Take 1 Tab by mouth every eight (8) hours as needed for Nausea.   Qty: 90 Tab, Refills: 11    Associated Diagnoses: [facility]             What to do at Home    Recommended diet: Regular Diet    Recommended activity: Activity as tolerated    Follow-up with Ca Garzon Santiago Nation MD  in 5 days. Information obtained by :  I understand that if any problems occur once I am at home I am to contact my physician. I understand and acknowledge receipt of the instructions indicated above.                                                                                                                                            Physician's or R.N.'s Signature                                                                  Date/Time                                                                                                                                              Patient or Representative Signature                                                          Date/Time

## 2019-10-17 NOTE — ROUTINE PROCESS
The following appointments have been successfully scheduled: 
 
Date/time Monday, October 21, 2019 01:15 PM 
Patient  Angela Whitney 1960 (26EN F) #[id number] #[id number] [facility] OFFICE Appointment type Transitional Care Provider Debbie Haile

## 2019-10-17 NOTE — PROGRESS NOTES
Bedside and verbal shift change report given to Ana Sr RN (oncoming nurse) by Patricia Sung RN (offgoing nurse). Report included the following information SBAR, Kardex, Intake/Output, Recent Results and Quality Measures.

## 2019-10-18 ENCOUNTER — PATIENT OUTREACH (OUTPATIENT)
Dept: CASE MANAGEMENT | Age: 59
End: 2019-10-18

## 2019-10-18 NOTE — DISCHARGE SUMMARY
[address] SUMMARY    Name:  Aide Pickett  MR#:  [id number]  :  1960  ACCOUNT #:  [de-identified]  ADMIT DATE:  10/03/2019  DISCHARGE DATE:  10/17/2019      HISTORY OF PRESENT ILLNESS:  The patient is a 56-year-old lady who has history of SF hemoglobinopathy, who presented to emergency room, because of increasing pain in her back, shoulders, and extremities. This was unresponsive to her p.o. analgesics. She was subsequently admitted for painful sickle crisis. Past medical history, social history, review of systems, family history, physical examination are as in admitting H and P.    LABORATORY VALUES:  Abnormalities on the comprehensive profile revealed a bilirubin initially of 1.7, AST of 70. Bilirubin reached a high of 3.4 on 10/05/2019 and at the time of discharge was 1.7. The initial hemoglobin was 7.5, white count 13.2, MCV was 97.7, platelet count was 669,769 with the following differential; 65 segs, 26 lymphocytes, 8 monocytes, 1 eosinophil. Retic count was 3.5. Hemoglobin reached a low of 6.3 on 10/08/2019 and after transfusion of 2 units of packed cells stabilized at 8.2. Blood cultures were negative. Fractionated hemoglobin revealed a hemoglobin F of 15.4% and S of 81.6%. RADIOGRAPHS:  Chest x-ray negative. HOSPITAL COURSE:  The patient was admitted and placed on parenteral analgesics, specifically Dilaudid 1 mg q. 3 hours along with p.r.n. use of Phenergan and Benadryl. Her pain was rather intense more so than her usual crisis. It became obvious because her bilirubin increased to 3.4, something which usually does not happen. She also developed a very high grade temperature after 24 hours. She was pancultured and started empirically on broad spectrum antibiotics. Her cultures subsequently were negative and antibiotics were discontinued. I felt the temperature elevation was related to her painful crisis.   Eventually, she defervesced and had no further problems with any suggestive features of sepsis. Her crisis continued and with a progressive drop in her hemoglobin at 6.3, she was transfused 2 units of packed cells. She tolerated this quite well. Her painful crisis gradually decreased. I increased her hydroxyurea to 500 mg b.i.d. without any adverse effects. At the time of discharge, she was asymptomatic. FINAL DIAGNOSES:  1.  Painful sickle crisis. 2.  SF hemoglobinopathy. 3.  Primary hypertension. 4.  History of depression. 5.  Obesity. 6.  Status post cholecystectomy. DISPOSITION:  1. The patient will be discharged home ambulatory on a regular diet. 2.  The patient is a full code. 3.  Discharge medications include the following:  Phenergan 25 mg q.8 hours p.r.n., folic acid 1 mg daily, telmisartan 40 mg daily, Celexa 10 mg daily, fentanyl patch 75 mcg q. 3 days, hydrocodone 10/325 one q. 6 hours p.r.n., hydroxyurea 500 mg b.i.d., omeprazole 20 mg daily. 4.  The patient will return to the office in the next five days. 5.  She is on restricted activities.         MD ERIK Dias/[email or web address]  D:  10/17/2019 8:46  T:  10/18/2019 2:57  JOB #:  [id number]

## 2019-10-18 NOTE — PROGRESS NOTES
Hospital Discharge Follow-Up    Date/Time:  10/18/2019 12:53 PM    Patient was admitted to [address] [place] [address] on 10/4 and discharged on 10/17/19 for sickle cell crisis. The physician discharge summary was available at the time of outreach. Patient was contacted within 1 business days of discharge. Attempt to reach pt -  Voicemail was full - LM for alternate contact - request return call. Top Challenges reviewed with the provider   Sickle cell   Pt is on GI bleed bundle  Esophageal stricture Unknown    Hepatic fibrosis Unknown    Hepatic steatosis Unknown    HTN Unknown    Hypertension 5 years ago    Overweight (BMI 25.0-29.9) 8 months ago    Pulsatile tinnitus 1 year ago    Sickle cell crisis ([facility] [address] 2 years ago      Pain control from sickle cell continues to challenge. Method of communication with provider :chart routing, staff message, phone      Care Transitions Nurse (CTN) attempt to contact the patient by telephone to perform post hospital discharge assessment. LM with associated contact/ pt VM full. Plan to :  Review discharge instructions and red flags. Disease Specific:   Hx GI bleed/ Sickle cell crisis/ condition mgmt. Summary of patient's top problems:  1. Sickle cell   2. GI bleed hx  3. Pain management d/t #1. Home Health orders at discharge:none ordered    Durable Medical Equipment ordered/company: none  Durable Medical Equipment received: n/a    Barriers to care? depression, financial, ineffective coping, support system, transportation, utilization of services    Advance Care Planning:   Does patient have an Advance Directive:  not on file; education provided     Medication(s):   New Medications at Discharge: Hydroxyurea 500 mg bid  Changed Medications at Discharge: none  Discontinued Medications at Discharge: none    Medication reconciliation pending:  with patient.     Referral to Pharm D needed: no       [facility] follow up appointment(s):   Future Appointments   Date Time Provider Waldo Calvo 10/21/2019  1:15 PM Clare Carrera MD [facility] [place] [address]   10/29/2019 12:30 PM Clare Carrera MD [address] [facility]      Non-[facility] follow up appointment(s): none noted  [facility]:  information provided as a resource       Goals      Admission Care      9/12/2019:  [facility] [place] [id number] [facility] submitted for Case Review Team today. Last call in for controlled med 9/4/2019. EW         Attends follow up appointments on schedule      6/7/2019:  Patient scheduled for [employer] [employer] appt as requested: Friday 6/14/2019:  12PM.  EW     6/25/2019:  Patient returns to f/u with PCP re crisis prevention/medication evaluation. EW     7/26/2019:  Met patient at office visit today. EW   8/12/2019: Follow KRISTINA appointment attended as scheduled. EW        Facilitate transitions of care (ie. palliative care, assisted living, nursing home, changes in living arrangements). 9/19/2019:  Consult w/ PCP for Ambulatory Pre D/C Planning. Consult done w/ Inpatient CM Ana Maria re d/c plans/concerns of patient; none specific-depression screening. NN spoke with Charge Nurse [facility]; [facility] depression screen is done under the admissions database questionaire;  Review of questionairre done: specifically suicide in nature. NN contacted patient for ambulatory pre d/c concerns re medication as previously experienced with [employer] . Patient denied any forseen concerns or problems w/ the needed pain medications or living arrangement. Depression screening done from the 2 PQ screening. Patient denied depression; began telling how she has a daughter and grandchildren here in town that is the center of her debra, and another one on the way within months.   States also son & wife who lives in [place] is also expecting a new baby which she is looking forward to going down the first of Nov.  Patient states she has too much going on to be depressed to the point of not wanting to do anything; states the depression she experiences is when she is in \"this hospital and can't get out. .. \". Patient states she sees her parents who are still living and doing well;  States she has lots of girls friends and they do a lot together--which she is missing out on. Patient teach-back done on the new medication: hydroxyurea. As previously informed, patient again states all the side effects she heard about, that when it first was offered her at [facility], it was primarily used for chemotherapy with multiple side effects but states now having tried everything else, and her PCP was comfortable in her trying; she states why not. .her son & daughter agrees with her trying it out to see how it is tolerated. Discussed the known side effects the hydroxyurea may bring, but she is willing to give it a try. Patient sounding upbeat and stating she is ready to go home; that she has a lot to look forward to. Patient further discussed weight loss plan ( as PCP had advised during office visits):  States she will give up drinking sodas-replacing with water and less eating out/buying prepared foods and begin to eat more healthy in light of \"foods that build Vit B levels in trying to work with this new medicine\". NN will continue to f/u post d/c.   EW     Sunday: 10/13/2019: Roxy Acosta 9/6-9/20/2019:  Sickle Cell Crisis Pain-Episode Closure  Call received from patient inquiring of PCP's visit. Remains c/o sickle cell pain but states getting better-no chest pain as bad. Denied immediate need for seeing Attending-states has been seen by Dr. Schaffer Sender. Patient is now readmitted for SS Crisis, Lower Back Pain & Chest Discomfort. Will close & resolve  previous KRISTINA episode for CTN KRISTINA management. EW         Knowledge and adherence of prescribed medication (ie. action, side effects, missed dose, etc.).      9/23/2019:  Medication Consult. Email sent to PharmD [place] re narcan recommendation.    EW    10/2/2019: Patient discussion centered around new med Hydroxyurea-denied any problems at all; however, PCP in office note states dosage needs to be higher--for this patient the titration probably is best.  Discussed side effects and when to contact the PCP:   fever, chills or sore throat, or other symptoms of a cold or flu. Do not treat self; decreases your body's ability to fight infections discussed. Patient states going out of town in 3 days; advised to try to avoid being around people who are sick. Also discussed nolaxone (narcan); patient stated she has been informed and knows about the medication and the usage for over taking pain meds, but states does not feel she takes that much, plus states she knows when to slack off and take less. Will continue to f/u.  EW        Patient/Family verbalizes understanding of self-management of chronic disease. 5/9/2019: [facility] 4/14-5/2/2019:  Sickle Cell Crisis f/u -see coordinate Pain Management Plan goal.  EW     6/7/2019:  Continued teachings done with rationale:    Drink plenty of fluids- dehydrated can increase your risk of a sickle crisis. Recommendation is about 8 glasses a day and drink more fluid if youre exercising or in hot weather. Sleep--encouraged to get enough sleep. Eat right-plenty of fruits, vegetables, whole grains, and protein--discussed the type meat/proteins she enjoyed; Elis Frederick Encouraged exercise in moderation for her; to aim to increase simply walking more around the house/any mod exercise a week;  Encouraged patient to PCP before starting a new exercise routine--Patient stated PCP has been on her at every visit to increase exercise/activity on a daily basis--discussed physical activity is key in staying healthy. NN warned against trying to  overdo it. Rest when you get tired. Take medicine as ordered. Make sure  take your prescription medicine as directed. Get medical and lab tests that your doctor recommends.   Discussion on getting the annual flu shot, and pneumococcal and meningococcal vaccines as recommended by PCP in that common illnesses, like the flu, can quickly become dangerous. Discussion on Extreme temperatures: extreme heat or cold, or any craig changes in temperatures, could set off a crisis. Reminded patient of High altitude--leading to Lack of oxygen at high altitudes could trigger a crisis. (Reminded that Planes, because theyre pressurized, shouldnt be a problem). Alcohol. It can make you dehydrated--patient denied consumptions of;    Smoking. This can trigger a lung condition called acute chest syndrome. This is when sickle cells stick together and block oxygen from getting into your lungs (even warned patient re walking in and out of stores that smokers congregate smoking). Infections. Common illnesses can be very serious for people with SCD. Wash your hands before eating or after using the bathroom. Wash your fruits and veggies, and avoid raw meat, eggs, and unpasteurized milk. Stress-  Though sometimes hard to avoid, but stress or depression can trigger a crisis (states she has not been depressed as much as usual)--advised to try to take time to relax or find techniques that help you calm down and such to prevent and/or relieve depression episodes. Patient agreed to try. EW      6/14/2019:  [facility] 5/26-6/6/2019:  Sickle Cell Crisis f/u  Patient in for f/u office visit today. 1.  Continued discussion of not being able tto obtain Fentanyl Patch & Hydrocodone simultaneously taken. Discussed the opiod crisis and what were her thoughts on the same. 2.  Supported and discussed PCP concerns for weight gain:   Eating same time each day; avoiding snacks; avoiding consumption of processed carbohydrates. Understanding assured with reiteration of advisement. EW     6/19/2019;  Patient Teach-back done:    Drinking plenty of fluids-verbalized understanding of prevention of increased risk of a sickle crisis.  States not 8 glasses yet but more than 5.  .  Sleep--states sleeping better but with medications. .  Eat right- agreed to increased buying of fruits & vegetables, whole grains, and sufficient meats without a lot of fat as protein. .States she is walking more as exercise.  -discussed physical activity is key in staying healthy. States she is getting more rest.  EW     6/25/2019:  Patient at office contact for PCP medication re-evaluation. Patient Review:  Proper diet & weight reduction as reviewed by PCP; The 3 advisements from PCP review: 1. eating meals 2.  eliminating snacks,  3. avoiding the consumption of processed carbs. Patient agrees to work on depression-continue anti-depressants as ordered. Advisement:  Eating the right foods at meals; avoiding sugary foods--advised that healthy eating affects mental and physical status; drinking sufficient water prevents dehydration. EW     8/12/2019:  Pain Coordination discussion w/ PCP. EW     9/4/2019:  Call received from patient stating she is run out of pain medication; states pharmacy states no refill on this type medication. Patient requesting refill. C/o painful joints and needs pain medication as soon as possible. Advised PCP at [facility] office and medication request for this  must go through Medication Specialist Monique Nichols; that prescriptions for controlled substance as this are not able to be simply called in, thus NN unable to do so; that Monique Nichols will reach him with patient concerns hourly if need. Agrees to contact Monique Nichols. Dressing/Staying warm/drinking warm liquids discussed. Consult done w/ PCP-agreed to look at request/need later this evening. EW.      9/23/2019:  No complaints re hydroxyurea. States diet high on the priority list.   EW       .                    Yessi Ye RN , [facility], CCM  Care transitions nurse/ [employer]  [phone]

## 2019-10-21 ENCOUNTER — OFFICE VISIT (OUTPATIENT)
Dept: INTERNAL MEDICINE CLINIC | Age: 59
End: 2019-10-21

## 2019-10-21 VITALS
TEMPERATURE: 98.7 F | DIASTOLIC BLOOD PRESSURE: 78 MMHG | BODY MASS INDEX: 30.65 KG/M2 | HEIGHT: 71 IN | OXYGEN SATURATION: 95 % | WEIGHT: 218.9 LBS | HEART RATE: 79 BPM | RESPIRATION RATE: 16 BRPM | SYSTOLIC BLOOD PRESSURE: 127 MMHG

## 2019-10-21 DIAGNOSIS — I10 ESSENTIAL HYPERTENSION: ICD-10-CM

## 2019-10-21 DIAGNOSIS — E66.09 CLASS 1 OBESITY DUE TO EXCESS CALORIES WITH SERIOUS COMORBIDITY AND BODY MASS INDEX (BMI) OF 30.0 TO 30.9 IN ADULT: ICD-10-CM

## 2019-10-21 DIAGNOSIS — D57.1 HB-SS DISEASE WITHOUT CRISIS (HCC): Primary | ICD-10-CM

## 2019-10-21 DIAGNOSIS — F32.A DEPRESSION, UNSPECIFIED DEPRESSION TYPE: ICD-10-CM

## 2019-10-21 NOTE — PROGRESS NOTES
Chief Complaint   Patient presents with    Transitions Of Care     1. Have you been to the ER, urgent care clinic since your last visit? Hospitalized since your last visit? Yes When: 10/03/2019-10/17/2019 Where: [facility] Reason for visit: Sickle Cell Anemia Crisis    2. Have you seen or consulted any other health care providers outside of the [address] [place] [address] since your last visit? Include any pap smears or colon screening.  No

## 2019-10-26 ENCOUNTER — HOSPITAL ENCOUNTER (INPATIENT)
Age: 59
LOS: 4 days | Discharge: HOME | DRG: 391 | End: 2019-10-31
Attending: EMERGENCY MEDICINE | Admitting: INTERNAL MEDICINE
Payer: MEDICARE

## 2019-10-26 ENCOUNTER — APPOINTMENT (OUTPATIENT)
Dept: GENERAL RADIOLOGY | Age: 59
DRG: 391 | End: 2019-10-26
Attending: EMERGENCY MEDICINE
Payer: MEDICARE

## 2019-10-26 DIAGNOSIS — R19.7 DIARRHEA, UNSPECIFIED TYPE: ICD-10-CM

## 2019-10-26 DIAGNOSIS — R11.2 INTRACTABLE VOMITING WITH NAUSEA, UNSPECIFIED VOMITING TYPE: Primary | ICD-10-CM

## 2019-10-26 DIAGNOSIS — D57.00 HB-SS DISEASE WITH CRISIS (HCC): ICD-10-CM

## 2019-10-26 LAB
ALBUMIN SERPL-MCNC: 4.3 G/DL (ref 3.5–5)
ALBUMIN/GLOB SERPL: 0.8 {RATIO} (ref 1.1–2.2)
ALP SERPL-CCNC: 212 U/L (ref 45–117)
ALT SERPL-CCNC: 24 U/L (ref 12–78)
ANION GAP SERPL CALC-SCNC: 11 MMOL/L (ref 5–15)
AST SERPL-CCNC: 61 U/L (ref 15–37)
BASOPHILS # BLD: 0 K/UL (ref 0–0.1)
BASOPHILS NFR BLD: 0 % (ref 0–1)
BILIRUB SERPL-MCNC: 2.5 MG/DL (ref 0.2–1)
BUN SERPL-MCNC: 15 MG/DL (ref 6–20)
BUN/CREAT SERPL: 19 (ref 12–20)
CALCIUM SERPL-MCNC: 9.8 MG/DL (ref 8.5–10.1)
CHLORIDE SERPL-SCNC: 108 MMOL/L (ref 97–108)
CO2 SERPL-SCNC: 18 MMOL/L (ref 21–32)
COMMENT, HOLDF: NORMAL
CREAT SERPL-MCNC: 0.81 MG/DL (ref 0.55–1.02)
DIFFERENTIAL METHOD BLD: ABNORMAL
EOSINOPHIL # BLD: 0 K/UL (ref 0–0.4)
EOSINOPHIL NFR BLD: 0 % (ref 0–7)
ERYTHROCYTE [DISTWIDTH] IN BLOOD BY AUTOMATED COUNT: 18.3 % (ref 11.5–14.5)
GLOBULIN SER CALC-MCNC: 5.3 G/DL (ref 2–4)
GLUCOSE SERPL-MCNC: 141 MG/DL (ref 65–100)
HCT VFR BLD AUTO: 27.1 % (ref 35–47)
HGB BLD-MCNC: 9.5 G/DL (ref 11.5–16)
IMM GRANULOCYTES # BLD AUTO: 0.1 K/UL (ref 0–0.04)
IMM GRANULOCYTES NFR BLD AUTO: 1 % (ref 0–0.5)
LIPASE SERPL-CCNC: 42 U/L (ref 73–393)
LYMPHOCYTES # BLD: 0.7 K/UL (ref 0.8–3.5)
LYMPHOCYTES NFR BLD: 6 % (ref 12–49)
MCH RBC QN AUTO: 35.2 PG (ref 26–34)
MCHC RBC AUTO-ENTMCNC: 35.1 G/DL (ref 30–36.5)
MCV RBC AUTO: 100.4 FL (ref 80–99)
MONOCYTES # BLD: 0.6 K/UL (ref 0–1)
MONOCYTES NFR BLD: 5 % (ref 5–13)
NEUTS SEG # BLD: 11 K/UL (ref 1.8–8)
NEUTS SEG NFR BLD: 88 % (ref 32–75)
NRBC # BLD: 1.21 K/UL (ref 0–0.01)
NRBC BLD-RTO: 9.8 PER 100 WBC
PLATELET # BLD AUTO: 391 K/UL (ref 150–400)
PMV BLD AUTO: 11.4 FL (ref 8.9–12.9)
POTASSIUM SERPL-SCNC: 4.1 MMOL/L (ref 3.5–5.1)
PROT SERPL-MCNC: 9.6 G/DL (ref 6.4–8.2)
RBC # BLD AUTO: 2.7 M/UL (ref 3.8–5.2)
RBC MORPH BLD: ABNORMAL
RETICS # AUTO: 0.18 M/UL (ref 0.02–0.08)
RETICS/RBC NFR AUTO: 6.7 % (ref 0.7–2.1)
SAMPLES BEING HELD,HOLD: NORMAL
SODIUM SERPL-SCNC: 137 MMOL/L (ref 136–145)
WBC # BLD AUTO: 12.4 K/UL (ref 3.6–11)

## 2019-10-26 PROCEDURE — 96375 TX/PRO/DX INJ NEW DRUG ADDON: CPT

## 2019-10-26 PROCEDURE — 73080 X-RAY EXAM OF ELBOW: CPT

## 2019-10-26 PROCEDURE — 36415 COLL VENOUS BLD VENIPUNCTURE: CPT

## 2019-10-26 PROCEDURE — 85045 AUTOMATED RETICULOCYTE COUNT: CPT

## 2019-10-26 PROCEDURE — 85025 COMPLETE CBC W/AUTO DIFF WBC: CPT

## 2019-10-26 PROCEDURE — 99284 EMERGENCY DEPT VISIT MOD MDM: CPT

## 2019-10-26 PROCEDURE — 74011250637 HC RX REV CODE- 250/637: Performed by: EMERGENCY MEDICINE

## 2019-10-26 PROCEDURE — 74011250636 HC RX REV CODE- 250/636: Performed by: EMERGENCY MEDICINE

## 2019-10-26 PROCEDURE — 96374 THER/PROPH/DIAG INJ IV PUSH: CPT

## 2019-10-26 PROCEDURE — 83690 ASSAY OF LIPASE: CPT

## 2019-10-26 PROCEDURE — 96372 THER/PROPH/DIAG INJ SC/IM: CPT

## 2019-10-26 PROCEDURE — 80053 COMPREHEN METABOLIC PANEL: CPT

## 2019-10-26 PROCEDURE — 96361 HYDRATE IV INFUSION ADD-ON: CPT

## 2019-10-26 RX ORDER — HYDROMORPHONE HYDROCHLORIDE 1 MG/ML
1 INJECTION, SOLUTION INTRAMUSCULAR; INTRAVENOUS; SUBCUTANEOUS
Status: COMPLETED | OUTPATIENT
Start: 2019-10-26 | End: 2019-10-26

## 2019-10-26 RX ORDER — DIPHENHYDRAMINE HYDROCHLORIDE 50 MG/ML
50 INJECTION, SOLUTION INTRAMUSCULAR; INTRAVENOUS
Status: COMPLETED | OUTPATIENT
Start: 2019-10-26 | End: 2019-10-26

## 2019-10-26 RX ORDER — PROMETHAZINE HYDROCHLORIDE 25 MG/ML
25 INJECTION, SOLUTION INTRAMUSCULAR; INTRAVENOUS
Status: COMPLETED | OUTPATIENT
Start: 2019-10-26 | End: 2019-10-26

## 2019-10-26 RX ORDER — ONDANSETRON 4 MG/1
8 TABLET, ORALLY DISINTEGRATING ORAL
Status: COMPLETED | OUTPATIENT
Start: 2019-10-26 | End: 2019-10-26

## 2019-10-26 RX ADMIN — DIPHENHYDRAMINE HYDROCHLORIDE 50 MG: 50 INJECTION, SOLUTION INTRAMUSCULAR; INTRAVENOUS at 22:33

## 2019-10-26 RX ADMIN — PROMETHAZINE HYDROCHLORIDE 25 MG: 25 INJECTION INTRAMUSCULAR; INTRAVENOUS at 19:28

## 2019-10-26 RX ADMIN — SODIUM CHLORIDE 1000 ML: 900 INJECTION, SOLUTION INTRAVENOUS at 23:25

## 2019-10-26 RX ADMIN — ONDANSETRON 8 MG: 4 TABLET, ORALLY DISINTEGRATING ORAL at 19:28

## 2019-10-26 RX ADMIN — HYDROMORPHONE HYDROCHLORIDE 1 MG: 1 INJECTION, SOLUTION INTRAMUSCULAR; INTRAVENOUS; SUBCUTANEOUS at 23:28

## 2019-10-26 NOTE — ED NOTES
Joanna Levi RN and SAMIR Boudreaux multiple attempts to start IV. Unsuccessful attempts. In need of ultrasound IV start. regular

## 2019-10-26 NOTE — ED PROVIDER NOTES
HPI the patient has had multiple episodes of vomiting and diarrhea since 9:30 AM this morning. She denies having blood in the vomiting or diarrhea and she denies fever, abdominal pain, chest pain or shortness of breath.       Past Medical History:   Diagnosis Date    Anemia     SC hemoglobinopathy    Chronic pain     Depression     Hypertension     Liver disease     hepatitis C; pt reports \"cured\"    Sickle cell disease (HCC)        Past Surgical History:   Procedure Laterality Date    BREAST SURGERY PROCEDURE UNLISTED      2 cysts removed from R breast    CHEST SURGERY PROCEDURE UNLISTED      status post thor for removal of a benign     HX BREAST BIOPSY Right 05/2007    negative    HX CHOLECYSTECTOMY      HX ORTHOPAEDIC      bony curettage of an ostial myelitis focus         Family History:   Problem Relation Age of Onset    Hypertension Mother     Hypertension Father        Social History     Socioeconomic History    Marital status:      Spouse name: Not on file    Number of children: 2    Years of education: Not on file    Highest education level: Not on file   Occupational History    Occupation: disabled---Sickle cell disease   Social Needs    Financial resource strain: Not on file    Food insecurity:     Worry: Not on file     Inability: Not on file    Transportation needs:     Medical: Not on file     Non-medical: Not on file   Tobacco Use    Smoking status: Never Smoker    Smokeless tobacco: Never Used   Substance and Sexual Activity    Alcohol use: No    Drug use: No    Sexual activity: Yes     Partners: Male   Lifestyle    Physical activity:     Days per week: Not on file     Minutes per session: Not on file    Stress: Not on file   Relationships    Social connections:     Talks on phone: Not on file     Gets together: Not on file     Attends Zoroastrian service: Not on file     Active member of club or organization: Not on file     Attends meetings of clubs or organizations: Not on file     Relationship status: Not on file    Intimate partner violence:     Fear of current or ex partner: Not on file     Emotionally abused: Not on file     Physically abused: Not on file     Forced sexual activity: Not on file   Other Topics Concern    Not on file   Social History Narrative    Not on file         ALLERGIES: Dilaudid [hydromorphone (bulk)] and Percocet [oxycodone-acetaminophen]    Review of Systems   Unable to perform ROS: Acuity of condition       Vitals:    10/26/19 1755 10/26/19 1815   BP: 162/87 151/52   Pulse: 95 82   Resp: 18 15   Temp: 98.7 °F (37.1 °C) 98.3 °F (36.8 °C)   SpO2: 100% 100%   Weight: 98.9 kg (218 lb)    Height: 5' 11\" (1.803 m)             Physical Exam   Constitutional: She appears well-developed and well-nourished. She appears distressed. Actively vomiting   HENT:   Head: Normocephalic. Eyes: Pupils are equal, round, and reactive to light. Neck: Normal range of motion. Cardiovascular: Normal rate and regular rhythm. No murmur heard. Pulmonary/Chest: Effort normal and breath sounds normal. No respiratory distress. Abdominal: Soft. There is no tenderness. Musculoskeletal: Normal range of motion. Neurological: She is alert. She has normal strength. Skin: Skin is warm and dry. Nursing note and vitals reviewed.        MDM       Procedures

## 2019-10-27 ENCOUNTER — APPOINTMENT (OUTPATIENT)
Dept: GENERAL RADIOLOGY | Age: 59
DRG: 391 | End: 2019-10-27
Attending: INTERNAL MEDICINE
Payer: MEDICARE

## 2019-10-27 ENCOUNTER — APPOINTMENT (OUTPATIENT)
Dept: CT IMAGING | Age: 59
DRG: 391 | End: 2019-10-27
Attending: INTERNAL MEDICINE
Payer: MEDICARE

## 2019-10-27 PROBLEM — K52.9 ACUTE GASTROENTERITIS: Status: ACTIVE | Noted: 2019-10-27

## 2019-10-27 LAB
ALBUMIN SERPL-MCNC: 4.4 G/DL (ref 3.5–5)
ALBUMIN/GLOB SERPL: 0.9 {RATIO} (ref 1.1–2.2)
ALP SERPL-CCNC: 214 U/L (ref 45–117)
ALT SERPL-CCNC: 22 U/L (ref 12–78)
AMPHET UR QL SCN: NEGATIVE
ANION GAP SERPL CALC-SCNC: 12 MMOL/L (ref 5–15)
APPEARANCE UR: CLEAR
AST SERPL-CCNC: 47 U/L (ref 15–37)
ATRIAL RATE: 116 BPM
BACTERIA URNS QL MICRO: NEGATIVE /HPF
BARBITURATES UR QL SCN: NEGATIVE
BASOPHILS # BLD: 0 K/UL (ref 0–0.1)
BASOPHILS NFR BLD: 0 % (ref 0–1)
BENZODIAZ UR QL: NEGATIVE
BILIRUB SERPL-MCNC: 2.1 MG/DL (ref 0.2–1)
BILIRUB UR QL CFM: NEGATIVE
BUN SERPL-MCNC: 16 MG/DL (ref 6–20)
BUN/CREAT SERPL: 19 (ref 12–20)
CALCIUM SERPL-MCNC: 9.8 MG/DL (ref 8.5–10.1)
CALCULATED P AXIS, ECG09: 73 DEGREES
CALCULATED R AXIS, ECG10: 68 DEGREES
CALCULATED T AXIS, ECG11: 36 DEGREES
CANNABINOIDS UR QL SCN: NEGATIVE
CHLORIDE SERPL-SCNC: 108 MMOL/L (ref 97–108)
CHOLEST SERPL-MCNC: 135 MG/DL
CO2 SERPL-SCNC: 19 MMOL/L (ref 21–32)
COCAINE UR QL SCN: NEGATIVE
COLOR UR: ABNORMAL
CREAT SERPL-MCNC: 0.85 MG/DL (ref 0.55–1.02)
DIAGNOSIS, 93000: NORMAL
DIFFERENTIAL METHOD BLD: ABNORMAL
DRUG SCRN COMMENT,DRGCM: ABNORMAL
EOSINOPHIL # BLD: 0 K/UL (ref 0–0.4)
EOSINOPHIL NFR BLD: 0 % (ref 0–7)
EPITH CASTS URNS QL MICRO: ABNORMAL /LPF
ERYTHROCYTE [DISTWIDTH] IN BLOOD BY AUTOMATED COUNT: 18 % (ref 11.5–14.5)
EST. AVERAGE GLUCOSE BLD GHB EST-MCNC: ABNORMAL MG/DL
FERRITIN SERPL-MCNC: 275 NG/ML (ref 8–252)
FOLATE SERPL-MCNC: 30.7 NG/ML (ref 5–21)
GLOBULIN SER CALC-MCNC: 5 G/DL (ref 2–4)
GLUCOSE SERPL-MCNC: 139 MG/DL (ref 65–100)
GLUCOSE UR STRIP.AUTO-MCNC: NEGATIVE MG/DL
HBA1C MFR BLD: 4.1 % (ref 4.2–6.3)
HCT VFR BLD AUTO: 27 % (ref 35–47)
HDLC SERPL-MCNC: 42 MG/DL
HDLC SERPL: 3.2 {RATIO} (ref 0–5)
HGB BLD-MCNC: 9.6 G/DL (ref 11.5–16)
HGB UR QL STRIP: NEGATIVE
HYALINE CASTS URNS QL MICRO: ABNORMAL /LPF (ref 0–5)
IMM GRANULOCYTES # BLD AUTO: 0.1 K/UL (ref 0–0.04)
IMM GRANULOCYTES NFR BLD AUTO: 1 % (ref 0–0.5)
IRON SATN MFR SERPL: 31 % (ref 20–50)
IRON SERPL-MCNC: 83 UG/DL (ref 35–150)
KETONES UR QL STRIP.AUTO: 80 MG/DL
LDLC SERPL CALC-MCNC: 79.2 MG/DL (ref 0–100)
LEUKOCYTE ESTERASE UR QL STRIP.AUTO: NEGATIVE
LIPID PROFILE,FLP: NORMAL
LYMPHOCYTES # BLD: 1 K/UL (ref 0.8–3.5)
LYMPHOCYTES NFR BLD: 9 % (ref 12–49)
MAGNESIUM SERPL-MCNC: 1.9 MG/DL (ref 1.6–2.4)
MCH RBC QN AUTO: 34.9 PG (ref 26–34)
MCHC RBC AUTO-ENTMCNC: 35.6 G/DL (ref 30–36.5)
MCV RBC AUTO: 98.2 FL (ref 80–99)
METHADONE UR QL: NEGATIVE
MONOCYTES # BLD: 0.7 K/UL (ref 0–1)
MONOCYTES NFR BLD: 6 % (ref 5–13)
NEUTS SEG # BLD: 9.2 K/UL (ref 1.8–8)
NEUTS SEG NFR BLD: 84 % (ref 32–75)
NITRITE UR QL STRIP.AUTO: NEGATIVE
NRBC # BLD: 1.27 K/UL (ref 0–0.01)
NRBC BLD-RTO: 11.6 PER 100 WBC
OPIATES UR QL: POSITIVE
P-R INTERVAL, ECG05: 152 MS
PCP UR QL: NEGATIVE
PH UR STRIP: 8 [PH] (ref 5–8)
PHOSPHATE SERPL-MCNC: 2.1 MG/DL (ref 2.6–4.7)
PLATELET # BLD AUTO: 382 K/UL (ref 150–400)
PMV BLD AUTO: 11 FL (ref 8.9–12.9)
POTASSIUM SERPL-SCNC: 3 MMOL/L (ref 3.5–5.1)
PROT SERPL-MCNC: 9.4 G/DL (ref 6.4–8.2)
PROT UR STRIP-MCNC: 30 MG/DL
Q-T INTERVAL, ECG07: 362 MS
QRS DURATION, ECG06: 84 MS
QTC CALCULATION (BEZET), ECG08: 503 MS
RBC # BLD AUTO: 2.75 M/UL (ref 3.8–5.2)
RBC #/AREA URNS HPF: ABNORMAL /HPF (ref 0–5)
RBC MORPH BLD: ABNORMAL
SODIUM SERPL-SCNC: 139 MMOL/L (ref 136–145)
SP GR UR REFRACTOMETRY: 1.01 (ref 1–1.03)
TIBC SERPL-MCNC: 266 UG/DL (ref 250–450)
TRIGL SERPL-MCNC: 69 MG/DL (ref ?–150)
TROPONIN I SERPL-MCNC: <0.05 NG/ML
TSH SERPL DL<=0.05 MIU/L-ACNC: 0.39 UIU/ML (ref 0.36–3.74)
UR CULT HOLD, URHOLD: NORMAL
UROBILINOGEN UR QL STRIP.AUTO: 2 EU/DL (ref 0.2–1)
VENTRICULAR RATE, ECG03: 116 BPM
VIT B12 SERPL-MCNC: 446 PG/ML (ref 193–986)
VLDLC SERPL CALC-MCNC: 13.8 MG/DL
WBC # BLD AUTO: 11 K/UL (ref 3.6–11)
WBC URNS QL MICRO: ABNORMAL /HPF (ref 0–4)

## 2019-10-27 PROCEDURE — 83735 ASSAY OF MAGNESIUM: CPT

## 2019-10-27 PROCEDURE — 74011000250 HC RX REV CODE- 250: Performed by: INTERNAL MEDICINE

## 2019-10-27 PROCEDURE — 82746 ASSAY OF FOLIC ACID SERUM: CPT

## 2019-10-27 PROCEDURE — 36556 INSERT NON-TUNNEL CV CATH: CPT

## 2019-10-27 PROCEDURE — 83540 ASSAY OF IRON: CPT

## 2019-10-27 PROCEDURE — 80307 DRUG TEST PRSMV CHEM ANLYZR: CPT

## 2019-10-27 PROCEDURE — 81001 URINALYSIS AUTO W/SCOPE: CPT

## 2019-10-27 PROCEDURE — 74176 CT ABD & PELVIS W/O CONTRAST: CPT

## 2019-10-27 PROCEDURE — 65270000029 HC RM PRIVATE

## 2019-10-27 PROCEDURE — 80053 COMPREHEN METABOLIC PANEL: CPT

## 2019-10-27 PROCEDURE — 74011250636 HC RX REV CODE- 250/636: Performed by: INTERNAL MEDICINE

## 2019-10-27 PROCEDURE — 87147 CULTURE TYPE IMMUNOLOGIC: CPT

## 2019-10-27 PROCEDURE — 80061 LIPID PANEL: CPT

## 2019-10-27 PROCEDURE — 84100 ASSAY OF PHOSPHORUS: CPT

## 2019-10-27 PROCEDURE — 74011250637 HC RX REV CODE- 250/637: Performed by: INTERNAL MEDICINE

## 2019-10-27 PROCEDURE — 71045 X-RAY EXAM CHEST 1 VIEW: CPT

## 2019-10-27 PROCEDURE — 02HV33Z INSERTION OF INFUSION DEVICE INTO SUPERIOR VENA CAVA, PERCUTANEOUS APPROACH: ICD-10-PCS | Performed by: ANESTHESIOLOGY

## 2019-10-27 PROCEDURE — 82607 VITAMIN B-12: CPT

## 2019-10-27 PROCEDURE — 83036 HEMOGLOBIN GLYCOSYLATED A1C: CPT

## 2019-10-27 PROCEDURE — 84443 ASSAY THYROID STIM HORMONE: CPT

## 2019-10-27 PROCEDURE — C1751 CATH, INF, PER/CENT/MIDLINE: HCPCS

## 2019-10-27 PROCEDURE — 85025 COMPLETE CBC W/AUTO DIFF WBC: CPT

## 2019-10-27 PROCEDURE — 36415 COLL VENOUS BLD VENIPUNCTURE: CPT

## 2019-10-27 PROCEDURE — C9113 INJ PANTOPRAZOLE SODIUM, VIA: HCPCS | Performed by: INTERNAL MEDICINE

## 2019-10-27 PROCEDURE — 82728 ASSAY OF FERRITIN: CPT

## 2019-10-27 PROCEDURE — 93005 ELECTROCARDIOGRAM TRACING: CPT

## 2019-10-27 PROCEDURE — 84484 ASSAY OF TROPONIN QUANT: CPT

## 2019-10-27 RX ORDER — HYDROMORPHONE HYDROCHLORIDE 1 MG/ML
1 INJECTION, SOLUTION INTRAMUSCULAR; INTRAVENOUS; SUBCUTANEOUS
Status: DISCONTINUED | OUTPATIENT
Start: 2019-10-27 | End: 2019-10-27

## 2019-10-27 RX ORDER — BISACODYL 5 MG
5 TABLET, DELAYED RELEASE (ENTERIC COATED) ORAL DAILY PRN
Status: DISCONTINUED | OUTPATIENT
Start: 2019-10-27 | End: 2019-10-31 | Stop reason: HOSPADM

## 2019-10-27 RX ORDER — HYDROMORPHONE HYDROCHLORIDE 1 MG/ML
1 INJECTION, SOLUTION INTRAMUSCULAR; INTRAVENOUS; SUBCUTANEOUS
Status: DISCONTINUED | OUTPATIENT
Start: 2019-10-27 | End: 2019-10-31 | Stop reason: HOSPADM

## 2019-10-27 RX ORDER — SODIUM CHLORIDE 0.9 % (FLUSH) 0.9 %
5-40 SYRINGE (ML) INJECTION AS NEEDED
Status: DISCONTINUED | OUTPATIENT
Start: 2019-10-27 | End: 2019-10-31 | Stop reason: HOSPADM

## 2019-10-27 RX ORDER — ONDANSETRON 2 MG/ML
4 INJECTION INTRAMUSCULAR; INTRAVENOUS
Status: DISCONTINUED | OUTPATIENT
Start: 2019-10-27 | End: 2019-10-31 | Stop reason: HOSPADM

## 2019-10-27 RX ORDER — SODIUM CHLORIDE, SODIUM LACTATE, POTASSIUM CHLORIDE, CALCIUM CHLORIDE 600; 310; 30; 20 MG/100ML; MG/100ML; MG/100ML; MG/100ML
75 INJECTION, SOLUTION INTRAVENOUS CONTINUOUS
Status: DISCONTINUED | OUTPATIENT
Start: 2019-10-27 | End: 2019-10-31 | Stop reason: HOSPADM

## 2019-10-27 RX ORDER — FOLIC ACID 1 MG/1
1 TABLET ORAL
Status: DISCONTINUED | OUTPATIENT
Start: 2019-10-27 | End: 2019-10-31 | Stop reason: HOSPADM

## 2019-10-27 RX ORDER — CITALOPRAM 20 MG/1
10 TABLET, FILM COATED ORAL EVERY EVENING
Status: DISCONTINUED | OUTPATIENT
Start: 2019-10-27 | End: 2019-10-31 | Stop reason: HOSPADM

## 2019-10-27 RX ORDER — HEPARIN SODIUM 5000 [USP'U]/ML
5000 INJECTION, SOLUTION INTRAVENOUS; SUBCUTANEOUS EVERY 8 HOURS
Status: DISCONTINUED | OUTPATIENT
Start: 2019-10-27 | End: 2019-10-31 | Stop reason: HOSPADM

## 2019-10-27 RX ORDER — DIPHENHYDRAMINE HYDROCHLORIDE 50 MG/ML
12.5 INJECTION, SOLUTION INTRAMUSCULAR; INTRAVENOUS
Status: DISCONTINUED | OUTPATIENT
Start: 2019-10-27 | End: 2019-10-31 | Stop reason: HOSPADM

## 2019-10-27 RX ORDER — HYDRALAZINE HYDROCHLORIDE 20 MG/ML
10 INJECTION INTRAMUSCULAR; INTRAVENOUS
Status: DISCONTINUED | OUTPATIENT
Start: 2019-10-27 | End: 2019-10-31 | Stop reason: HOSPADM

## 2019-10-27 RX ORDER — FENTANYL 75 UG/H
1 PATCH TRANSDERMAL
Status: DISCONTINUED | OUTPATIENT
Start: 2019-10-27 | End: 2019-10-31 | Stop reason: HOSPADM

## 2019-10-27 RX ORDER — ONDANSETRON 4 MG/1
8 TABLET, ORALLY DISINTEGRATING ORAL
Status: COMPLETED | OUTPATIENT
Start: 2019-10-27 | End: 2019-10-27

## 2019-10-27 RX ORDER — HYDROXYUREA 500 MG/1
500 CAPSULE ORAL 2 TIMES DAILY
Status: DISCONTINUED | OUTPATIENT
Start: 2019-10-27 | End: 2019-10-31 | Stop reason: HOSPADM

## 2019-10-27 RX ORDER — SODIUM CHLORIDE 0.9 % (FLUSH) 0.9 %
5-40 SYRINGE (ML) INJECTION EVERY 8 HOURS
Status: DISCONTINUED | OUTPATIENT
Start: 2019-10-27 | End: 2019-10-31 | Stop reason: HOSPADM

## 2019-10-27 RX ORDER — DIPHENHYDRAMINE HYDROCHLORIDE 50 MG/ML
12.5 INJECTION, SOLUTION INTRAMUSCULAR; INTRAVENOUS
Status: DISCONTINUED | OUTPATIENT
Start: 2019-10-27 | End: 2019-10-27

## 2019-10-27 RX ORDER — HYDROCODONE BITARTRATE AND ACETAMINOPHEN 10; 325 MG/1; MG/1
1 TABLET ORAL
Status: DISCONTINUED | OUTPATIENT
Start: 2019-10-27 | End: 2019-10-31 | Stop reason: HOSPADM

## 2019-10-27 RX ORDER — CLONIDINE HYDROCHLORIDE 0.1 MG/1
0.1 TABLET ORAL
Status: COMPLETED | OUTPATIENT
Start: 2019-10-27 | End: 2019-10-27

## 2019-10-27 RX ORDER — LOSARTAN POTASSIUM 50 MG/1
50 TABLET ORAL DAILY
Status: DISCONTINUED | OUTPATIENT
Start: 2019-10-27 | End: 2019-10-31 | Stop reason: HOSPADM

## 2019-10-27 RX ORDER — ACETAMINOPHEN 325 MG/1
650 TABLET ORAL
Status: DISCONTINUED | OUTPATIENT
Start: 2019-10-27 | End: 2019-10-31 | Stop reason: HOSPADM

## 2019-10-27 RX ADMIN — SODIUM CHLORIDE 2.5 MG: 9 INJECTION INTRAMUSCULAR; INTRAVENOUS; SUBCUTANEOUS at 10:05

## 2019-10-27 RX ADMIN — SODIUM CHLORIDE 40 MG: 9 INJECTION INTRAMUSCULAR; INTRAVENOUS; SUBCUTANEOUS at 08:04

## 2019-10-27 RX ADMIN — HYDRALAZINE HYDROCHLORIDE 10 MG: 20 INJECTION INTRAMUSCULAR; INTRAVENOUS at 03:31

## 2019-10-27 RX ADMIN — HYDROMORPHONE HYDROCHLORIDE 1 MG: 1 INJECTION, SOLUTION INTRAMUSCULAR; INTRAVENOUS; SUBCUTANEOUS at 10:05

## 2019-10-27 RX ADMIN — HYDROMORPHONE HYDROCHLORIDE 1 MG: 1 INJECTION, SOLUTION INTRAMUSCULAR; INTRAVENOUS; SUBCUTANEOUS at 03:26

## 2019-10-27 RX ADMIN — HYDROMORPHONE HYDROCHLORIDE 1 MG: 1 INJECTION, SOLUTION INTRAMUSCULAR; INTRAVENOUS; SUBCUTANEOUS at 19:10

## 2019-10-27 RX ADMIN — SODIUM CHLORIDE, SODIUM LACTATE, POTASSIUM CHLORIDE, AND CALCIUM CHLORIDE 150 ML/HR: 600; 310; 30; 20 INJECTION, SOLUTION INTRAVENOUS at 12:15

## 2019-10-27 RX ADMIN — FOLIC ACID 1 MG: 1 TABLET ORAL at 22:13

## 2019-10-27 RX ADMIN — DIPHENHYDRAMINE HYDROCHLORIDE 12.5 MG: 50 INJECTION, SOLUTION INTRAMUSCULAR; INTRAVENOUS at 10:05

## 2019-10-27 RX ADMIN — Medication 10 ML: at 07:34

## 2019-10-27 RX ADMIN — HEPARIN SODIUM 5000 UNITS: 5000 INJECTION INTRAVENOUS; SUBCUTANEOUS at 12:15

## 2019-10-27 RX ADMIN — SODIUM CHLORIDE, SODIUM LACTATE, POTASSIUM CHLORIDE, AND CALCIUM CHLORIDE 150 ML/HR: 600; 310; 30; 20 INJECTION, SOLUTION INTRAVENOUS at 03:32

## 2019-10-27 RX ADMIN — HYDROXYUREA 500 MG: 500 CAPSULE ORAL at 08:07

## 2019-10-27 RX ADMIN — ONDANSETRON 8 MG: 4 TABLET, ORALLY DISINTEGRATING ORAL at 01:11

## 2019-10-27 RX ADMIN — ACETAMINOPHEN 650 MG: 325 TABLET, FILM COATED ORAL at 08:04

## 2019-10-27 RX ADMIN — DIPHENHYDRAMINE HYDROCHLORIDE 12.5 MG: 50 INJECTION, SOLUTION INTRAMUSCULAR; INTRAVENOUS at 07:31

## 2019-10-27 RX ADMIN — HYDROMORPHONE HYDROCHLORIDE 1 MG: 1 INJECTION, SOLUTION INTRAMUSCULAR; INTRAVENOUS; SUBCUTANEOUS at 13:08

## 2019-10-27 RX ADMIN — HYDROMORPHONE HYDROCHLORIDE 1 MG: 1 INJECTION, SOLUTION INTRAMUSCULAR; INTRAVENOUS; SUBCUTANEOUS at 07:31

## 2019-10-27 RX ADMIN — HEPARIN SODIUM 5000 UNITS: 5000 INJECTION INTRAVENOUS; SUBCUTANEOUS at 19:10

## 2019-10-27 RX ADMIN — ONDANSETRON 4 MG: 2 INJECTION INTRAMUSCULAR; INTRAVENOUS at 07:39

## 2019-10-27 RX ADMIN — HEPARIN SODIUM 5000 UNITS: 5000 INJECTION INTRAVENOUS; SUBCUTANEOUS at 03:31

## 2019-10-27 RX ADMIN — Medication 10 ML: at 16:34

## 2019-10-27 RX ADMIN — CITALOPRAM HYDROBROMIDE 10 MG: 20 TABLET ORAL at 19:10

## 2019-10-27 RX ADMIN — DIPHENHYDRAMINE HYDROCHLORIDE 12.5 MG: 50 INJECTION, SOLUTION INTRAMUSCULAR; INTRAVENOUS at 03:26

## 2019-10-27 RX ADMIN — DIPHENHYDRAMINE HYDROCHLORIDE 12.5 MG: 50 INJECTION, SOLUTION INTRAMUSCULAR; INTRAVENOUS at 16:34

## 2019-10-27 RX ADMIN — HYDROMORPHONE HYDROCHLORIDE 1 MG: 1 INJECTION, SOLUTION INTRAMUSCULAR; INTRAVENOUS; SUBCUTANEOUS at 22:13

## 2019-10-27 RX ADMIN — HYDROMORPHONE HYDROCHLORIDE 1 MG: 1 INJECTION, SOLUTION INTRAMUSCULAR; INTRAVENOUS; SUBCUTANEOUS at 16:34

## 2019-10-27 RX ADMIN — HYDROXYUREA 500 MG: 500 CAPSULE ORAL at 19:10

## 2019-10-27 RX ADMIN — ONDANSETRON 4 MG: 2 INJECTION INTRAMUSCULAR; INTRAVENOUS at 03:27

## 2019-10-27 RX ADMIN — Medication 10 ML: at 22:24

## 2019-10-27 RX ADMIN — DIPHENHYDRAMINE HYDROCHLORIDE 12.5 MG: 50 INJECTION, SOLUTION INTRAMUSCULAR; INTRAVENOUS at 13:07

## 2019-10-27 RX ADMIN — LOSARTAN POTASSIUM 50 MG: 50 TABLET ORAL at 08:04

## 2019-10-27 RX ADMIN — SODIUM CHLORIDE 2.5 MG: 9 INJECTION INTRAMUSCULAR; INTRAVENOUS; SUBCUTANEOUS at 22:13

## 2019-10-27 RX ADMIN — FOLIC ACID 1 MG: 1 TABLET ORAL at 03:32

## 2019-10-27 RX ADMIN — DIPHENHYDRAMINE HYDROCHLORIDE 12.5 MG: 50 INJECTION, SOLUTION INTRAMUSCULAR; INTRAVENOUS at 19:10

## 2019-10-27 RX ADMIN — SODIUM CHLORIDE 2.5 MG: 9 INJECTION INTRAMUSCULAR; INTRAVENOUS; SUBCUTANEOUS at 16:34

## 2019-10-27 RX ADMIN — CLONIDINE HYDROCHLORIDE 0.1 MG: 0.1 TABLET ORAL at 01:35

## 2019-10-27 RX ADMIN — DIPHENHYDRAMINE HYDROCHLORIDE 12.5 MG: 50 INJECTION, SOLUTION INTRAMUSCULAR; INTRAVENOUS at 22:13

## 2019-10-27 NOTE — ED NOTES
Anesthesiologist at bedside. Multiple IV attempts. Unable to obtain IV access. Ntete Hills MD notified.

## 2019-10-27 NOTE — CONSULTS
[address]  174 [facility], [address] [place] [address]       GASTROENTEROLOGY CONSULTATION NOTE        NAME:  Naz Britton   :   1960   MRN:   [id number]           Consult Date: 10/27/2019 12:26 PM      History of Present Illness:  Patient is a 64 y.o. who is seen in consultation at the request of Dr. Osei Nichols for gastroenteritis. She has h/o GERD , had EGD in the past by Dr. Brigitte Gr form [facility], h/o HCV treated at [facility]. She was admitted for acute onset of diarrhea and vomiting, also having sickle cell crisis. Her GI symptoms got better today, no vomiting, no BM today, no abdominal pain. PMH:  Past Medical History:   Diagnosis Date    Anemia     SC hemoglobinopathy    Chronic pain     Depression     Hypertension     Liver disease     hepatitis C; pt reports \"cured\"    Sickle cell disease (HCC)        PSH:  Past Surgical History:   Procedure Laterality Date    BREAST SURGERY PROCEDURE UNLISTED      2 cysts removed from R breast    CHEST SURGERY PROCEDURE UNLISTED      status post thor for removal of a benign     HX BREAST BIOPSY Right 2007    negative    HX CHOLECYSTECTOMY      HX ORTHOPAEDIC      bony curettage of an ostial myelitis focus       Allergies: Allergies   Allergen Reactions    Dilaudid [Hydromorphone (Bulk)] Itching     Can tolerate with benadryl and ondansetron    Percocet [Oxycodone-Acetaminophen] Itching       Home Medications:  Prior to Admission Medications   Prescriptions Last Dose Informant Patient Reported? Taking? HYDROcodone-acetaminophen (NORCO)  mg tablet   No No   Sig: Take 1 Tab by mouth every six (6) hours as needed for Pain for up to 30 days. Max Daily Amount: 4 Tabs. Dx.D57.1  Indications: sickl;e cell crisis   citalopram (CELEXA) 10 mg tablet  Self No No   Sig: TAKE 1 TABLET BY MOUTH EVERY NIGHT AT BEDTIME   fentaNYL (DURAGESIC) 75 mcg/hr   No No   Si Patch by TransDERmal route every seventy-two (72) hours for 30 days.  Max Daily Amount: 1 Patch. folic acid (FOLVITE) 1 mg tablet  Self Yes No   Sig: Take 1 mg by mouth nightly. hydrocortisone (CORTISONE) 1 % topical cream  Self No No   Sig: Apply  to affected area two (2) times daily as needed for Skin Irritation. use thin layer   hydroxyurea (HYDREA) 500 mg capsule   No No   Si tablet two times a day   omeprazole (PRILOSEC OTC) 20 mg tablet  Self Yes No   Sig: Take 20 mg by mouth nightly. promethazine (PHENERGAN) 25 mg tablet  Self No No   Sig: Take 1 Tab by mouth every eight (8) hours as needed for Nausea. telmisartan (MICARDIS) 40 mg tablet  Self No No   Sig: Take 1 Tab by mouth nightly.       Facility-Administered Medications: None       Hospital Medications:  Current Facility-Administered Medications   Medication Dose Route Frequency    citalopram (CELEXA) tablet 10 mg  10 mg Oral QPM    folic acid (FOLVITE) tablet 1 mg  1 mg Oral QHS    HYDROcodone-acetaminophen (NORCO)  mg tablet 1 Tab  1 Tab Oral Q6H PRN    hydroxyurea (HYDREA) chemo cap 500 mg  500 mg Oral BID    losartan (COZAAR) tablet 50 mg  50 mg Oral DAILY    sodium chloride (NS) flush 5-40 mL  5-40 mL IntraVENous Q8H    sodium chloride (NS) flush 5-40 mL  5-40 mL IntraVENous PRN    acetaminophen (TYLENOL) tablet 650 mg  650 mg Oral Q4H PRN    lactated Ringers infusion  150 mL/hr IntraVENous CONTINUOUS    ondansetron (ZOFRAN) injection 4 mg  4 mg IntraVENous Q4H PRN    bisacodyl (DULCOLAX) tablet 5 mg  5 mg Oral DAILY PRN    heparin (porcine) injection 5,000 Units  5,000 Units SubCUTAneous Q8H    pantoprazole (PROTONIX) 40 mg in sodium chloride 0.9% 10 mL injection  40 mg IntraVENous DAILY    hydrALAZINE (APRESOLINE) 20 mg/mL injection 10 mg  10 mg IntraVENous Q6H PRN    HYDROmorphone (PF) (DILAUDID) injection 1 mg  1 mg IntraVENous Q3H PRN    diphenhydrAMINE (BENADRYL) injection 12.5 mg  12.5 mg IntraVENous Q3H PRN    prochlorperazine (COMPAZINE) with saline injection 2.5 mg  2.5 mg IntraVENous Q6H PRN    fentaNYL (DURAGESIC) 75 mcg/hr patch 1 Patch  1 Patch TransDERmal Q72H       Social History:  Social History     Tobacco Use    Smoking status: Never Smoker    Smokeless tobacco: Never Used   Substance Use Topics    Alcohol use: No       Family History:  Family History   Problem Relation Age of Onset    Hypertension Mother     Hypertension Father        Review of Systems:  Constitutional: negative fever, negative chills, negative weight loss  Eyes:   negative visual changes  ENT:   negative sore throat, tongue or lip swelling  Respiratory:  negative cough, negative dyspnea  Cards:  negative for chest pain, palpitations, lower extremity edema  GI:   See HPI  :  negative for frequency, dysuria  Integument:  negative for rash and pruritus  Heme:  negative for easy bruising and gum/nose bleeding  Musculoskel: negative for myalgias,  back pain and muscle weakness  Neuro: negative for headaches, dizziness, vertigo  Psych:  negative for feelings of anxiety, depression     Objective:     Patient Vitals for the past 8 hrs:   BP Temp Pulse Resp SpO2 Weight   10/27/19 0744 (!) 178/100 100 °F (37.8 °C) (!) 106 18 100 %    10/27/19 0538      89.7 kg (197 lb 12.8 oz)     10/27 0701 - 10/27 1900  In: 240 [P.O.:240]  Out: -   10/25 1901 - 10/27 0700  In: 1000 [I.V.:1000]  Out: -     EXAM:     NEURO-a&o   HEENT-wnl   LUNGS-clear    COR-regular rate and rhythym     ABD-soft , no tenderness, no rebound, good bs     EXT-no edema     Data Review     Recent Labs     10/27/19  0347 10/26/19  2228   WBC 11.0 12.4*   HGB 9.6* 9.5*   HCT 27.0* 27.1*    391     Recent Labs     10/27/19  0347 10/26/19  2228    137   K 3.0* 4.1    108   CO2 19* 18*   BUN 16 15   CREA 0.85 0.81   * 141*   PHOS 2.1*  --    CA 9.8 9.8     Recent Labs     10/27/19  0347 10/26/19  2228   SGOT 47* 61*   * 212*   TP 9.4* 9.6*   ALB 4.4 4.3   GLOB 5.0* 5.3*   LPSE  --  42*     No results for input(s): INR, PTP, APTT, INREXT in the last 72 hours. Assessment:   · Acute onset of N/V and diarrhea, suspect infectious gastroenteritis, improved  · HCV infection treated  · GERD     Patient Active Problem List   Diagnosis Code    Hypertension I10    Venous insufficiency I87.2    Depression F32.9    Sickle cell crisis (HCC) D57.00    Pulsatile tinnitus H93. A9    Carpal tunnel syndrome of right wrist G56.01    Sickle cell pain crisis ([facility] [address] D57.00    Overweight (BMI 25.0-29. 9) E66.3    Sickle cell disease (HCC) D57.1    Class 1 obesity due to excess calories with serious comorbidity and body mass index (BMI) of 30.0 to 30.9 in adult E66.09, Z68.30    Acute gastroenteritis K52.9     Plan:   · Continue on current care  · Advanced diet in am   · No need for EGD  · Dr. Sravanthi Barrientos will follow     Signed By: Adelita Weaver MD     10/27/2019  12:26 PM

## 2019-10-27 NOTE — ROUTINE PROCESS
Bedside shift change report given to David (oncoming nurse) by Wild Dupree (offgoing nurse). Report included the following information SBAR, Kardex, Intake/Output, MAR and Recent Results.

## 2019-10-27 NOTE — PROGRESS NOTES
PLEASE NOTE--Encounter / Re-Admission Within 30 Days  This patient has had another encounter or admission within the last 30 days. This patient was admitted at [facility] from 10/03/2019 to 10/17/2019 due to sickle cell crisis and other medical conditions. Patient also has 5 IP admissions in the last six months. Transition of [address] [place] [address] Drive with family assistance once medically stable   Transport: Brother Juan Carlos Breaker [phone]  [address] Follow up with PCP/Specialist     Reason for Admission:   Sickle Cell Crisis                   RRAT Score:     19-moderate              Do you (patient/family) have any concerns for transition/discharge? No, patient has medicare part A&B and Blue Cross coverage. Patient also stated that she has a strong family support and friends assist her as needed. Plan for utilizing home health:   TBD based on care provider's recommendation. But based on prior admissions review patient doesn't anticipate [place] services. Current Advanced Directive/Advance Care Plan:  Patient is full code. Given the high admission rate and medical diagnosis, this CRM educated patient on the need of ACP. Patient verbalized the understanding of need for an AMD but doesn't want to do it now. Transition of Care Plan:          CRM met patient at bedside, introduced role, verified demographics, and offered assistance. Patient came to the ED via EMS. The patient resides in one-level house with 3-4 steps to enter. Patient lives alone but has a strong relationship friends and families. Patient has one daughter that lives locally and son lives in [place]. She has a brother who lives nearby and is supportive. Patient indicated a strong family support and denied any barriers post-discharge. Patient uses [address] located at [address] [place] [address] in [place].  Patient's friends or brother will transport upon discharge. Given higher IP admissions, patient may benefit from a LTC plan. Also, having someone to live with her would provide increased sense of  companionship and easy reach for help in emergencies. Care Management Interventions  PCP Verified by CM:  Yes  Last Visit to PCP: 10/21/19  Mode of Transport at Discharge: ALS(brother or friends )  Physical Therapy Consult: No  Speech Therapy Consult: No  Current Support Network: Lives Alone, Own Home  Confirm Follow Up Transport: Family(Brother Tate Bingham [phone])  Plan discussed with Pt/Family/Caregiver: Yes  Discharge Location  Discharge Placement: Home with family assistance      Tres Jack  Clinical     [phone]

## 2019-10-27 NOTE — ED NOTES
8569- Patient vomiting.    0159- C-line placed. 0215- XRAY called again. None available Personal collateral

## 2019-10-27 NOTE — ED NOTES
RN to bedside to medicate patient. RN started NS bolus and gave patient 50mg benadryl. RN noted edema to IV insertion site and infiltration. Rhea Martinez MD notified. No orders received at this time.

## 2019-10-27 NOTE — PROGRESS NOTES
Problem: Falls - Risk of  Goal: *Absence of Falls  Description  Document Ivangela Zavala Fall Risk and appropriate interventions in the flowsheet.   Outcome: Progressing Towards Goal  Note:   Fall Risk Interventions:            Medication Interventions: Teach patient to arise slowly                   Problem: Patient Education: Go to Patient Education Activity  Goal: Patient/Family Education  Outcome: Progressing Towards Goal

## 2019-10-27 NOTE — PROGRESS NOTES
[address] [place] [address] and Primary Care  [place] [address]  [address] [place] [address]  Phone:  [phone]  Fax: [phone]       Chief Complaint   Patient presents with    Transitions Of Care   . SUBJECTIVE:    Kristofer Abdullahi is a 64 y.o. female Comes in for return visit stating that she is doing well. Since getting discharged she continues to have intermittent pain requiring medication, but she is better. She is taking her Hydroxyurea b.i.d. currently. 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 Unfortunately she is gaining weight. This is directly related to her consumption of processed carbohydrates. She has a past history of primary hypertension and depression, both of which have been stable.            Facility-Administered Medications Ordered in Other Visits   Medication Dose Route Frequency Provider Last Rate Last Dose    citalopram (CELEXA) tablet 10 mg  10 mg Oral QPM Kofi Aranda MD        folic acid (FOLVITE) tablet 1 mg  1 mg Oral QHS Kofi Aranda MD   1 mg at 10/27/19 0332    HYDROcodone-acetaminophen (NORCO)  mg tablet 1 Tab  1 Tab Oral Q6H PRN Kofi Aranda MD        hydroxyurea (HYDREA) chemo cap 500 mg  500 mg Oral BID Kofi Aranda MD   500 mg at 10/27/19 0807    losartan (COZAAR) tablet 50 mg  50 mg Oral DAILY Kofi Aranda MD   50 mg at 10/27/19 0804    sodium chloride (NS) flush 5-40 mL  5-40 mL IntraVENous Q8H Kofi Aranda MD   10 mL at 10/27/19 0734    sodium chloride (NS) flush 5-40 mL  5-40 mL IntraVENous PRN Kofi Aranda MD        acetaminophen (TYLENOL) tablet 650 mg  650 mg Oral Q4H PRN Kofi Aranda MD   650 mg at 10/27/19 0804    lactated Ringers infusion  150 mL/hr IntraVENous CONTINUOUS Kofi Aranda  mL/hr at 10/27/19 1215 150 mL/hr at 10/27/19 1215    ondansetron (ZOFRAN) injection 4 mg  4 mg IntraVENous Q4H PRN Kofi Aranda MD   4 mg at 10/27/19 0739    bisacodyl (DULCOLAX) tablet 5 mg  5 mg Oral DAILY PRN King Lea MD        heparin (porcine) injection 5,000 Units  5,000 Units SubCUTAneous Q8H Kofi Aranda MD   5,000 Units at 10/27/19 1215    pantoprazole (PROTONIX) 40 mg in sodium chloride 0.9% 10 mL injection  40 mg IntraVENous DAILY Kofi Aranda MD   40 mg at 10/27/19 0804    hydrALAZINE (APRESOLINE) 20 mg/mL injection 10 mg  10 mg IntraVENous Q6H PRN Kofi Aranda MD   10 mg at 10/27/19 0331    HYDROmorphone (PF) (DILAUDID) injection 1 mg  1 mg IntraVENous Q3H PRN Ash Sea M, DO   1 mg at 10/27/19 1308    diphenhydrAMINE (BENADRYL) injection 12.5 mg  12.5 mg IntraVENous Q3H PRN Ash Sea M, DO   12.5 mg at 10/27/19 1307    prochlorperazine (COMPAZINE) with saline injection 2.5 mg  2.5 mg IntraVENous Q6H PRN Ash Sea M, DO   2.5 mg at 10/27/19 1005    fentaNYL (DURAGESIC) 75 mcg/hr patch 1 Patch  1 Patch TransDERmal Q72H Ash Sea M, DO   1 Patch at 10/27/19 1215     Past Medical History:   Diagnosis Date    Anemia     SC hemoglobinopathy    Chronic pain     Depression     Hypertension     Liver disease     hepatitis C; pt reports \"cured\"    Sickle cell disease ([address]      Past Surgical History:   Procedure Laterality Date    BREAST SURGERY PROCEDURE UNLISTED      2 cysts removed from R breast    CHEST SURGERY PROCEDURE UNLISTED      status post thor for removal of a benign     HX BREAST BIOPSY Right 05/2007    negative    HX CHOLECYSTECTOMY      HX ORTHOPAEDIC      bony curettage of an ostial myelitis focus     Allergies   Allergen Reactions    Dilaudid [Hydromorphone (Bulk)] Itching     Can tolerate with benadryl and ondansetron    Percocet [Oxycodone-Acetaminophen] Itching         REVIEW OF SYSTEMS:  General: negative for - chills or fever  ENT: negative for - headaches, nasal congestion or tinnitus  Respiratory: negative for - cough, hemoptysis, shortness of breath or wheezing  Cardiovascular : negative for - chest pain, edema, palpitations or shortness of breath  Gastrointestinal: negative for - abdominal pain, blood in stools, heartburn or nausea/vomiting  Genito-Urinary: no dysuria, trouble voiding, or hematuria  Musculoskeletal: negative for - gait disturbance, joint pain, joint stiffness or joint swelling  Neurological: no TIA or stroke symptoms  Hematologic: no bruises, no bleeding, no swollen glands  Integument: no lumps, mole changes, nail changes or rash  Endocrine: no malaise/lethargy or unexpected weight changes      Social History     Socioeconomic History    Marital status:      Spouse name: Not on file    Number of children: 2    Years of education: Not on file    Highest education level: Not on file   Occupational History    Occupation: disabled---Sickle cell disease   Tobacco Use    Smoking status: Never Smoker    Smokeless tobacco: Never Used   Substance and Sexual Activity    Alcohol use: No    Drug use: No    Sexual activity: Yes     Partners: Male     Family History   Problem Relation Age of Onset    Hypertension Mother     Hypertension Father        OBJECTIVE:    Visit Vitals  /78   Pulse 79   Temp 98.7 °F (37.1 °C) (Oral)   Resp 16   Ht 5' 11\" (1.803 m)   Wt 218 lb 14.4 oz (99.3 kg)   SpO2 95%   BMI 30.53 kg/m²     CONSTITUTIONAL: well , well nourished, appears age appropriate  EYES: perrla, eom intact  ENMT:moist mucous membranes, pharynx clear  NECK: supple. Thyroid normal  RESPIRATORY: Chest: clear to ascultation and percussion   CARDIOVASCULAR: Heart: regular rate and rhythm  GASTROINTESTINAL: Abdomen: soft, bowel sounds active  HEMATOLOGIC: no pathological lymph nodes palpated  MUSCULOSKELETAL: Extremities: no edema, pulse 1+   INTEGUMENT: No unusual rashes or suspicious skin lesions noted. Nails appear normal.  NEUROLOGIC: non-focal exam   MENTAL STATUS: alert and oriented, appropriate affect      ASSESSMENT:  1. Hb-SS disease without crisis ([address]    2. Essential hypertension    3. Class 1 obesity due to excess calories with serious comorbidity and body mass index (BMI) of 30.0 to 30.9 in adult    4. Depression, unspecified depression type        PLAN:    1. Her sickle cell disease is fairly stable at this point. She continues to have pain and takes her analgesics as needed. 2. Blood pressure is excellent today, no adjustments are made. 3. She really needs to lose weight. This can be accomplished by eating meals, eliminating snacks and avoiding the consumption of processed carbohydrates. 4. Her depression is also stable. She will continue Celexa daily. Follow-up and Dispositions    · Return in about 2 months (around 12/21/2019).            Tammy De La Cruz MD

## 2019-10-27 NOTE — ED NOTES
Dorothy Santana, SAMIR and Breann Hamm, SAMIR at bedside attempting to get IV access. Breann Hamm RN to see if pediatric ER RN's can attempt.

## 2019-10-27 NOTE — PROGRESS NOTES
Patient seen and examined. Admitted earlier by my colleague, Dr. Dwight Peña. Patient with bilateral hip and back pain. N/V/D on admission. Has several admissions past year for sickle cell crisis. History of prior controlled symptoms and responsive to outpatient management. Increase frequency dilaudid and benadryl. Add fentanyl patch. These medications similar to prior admissions. Hematology consulted. Continue IVFs. Clear liquid diet.          Mira Renae DO

## 2019-10-27 NOTE — ED NOTES
TRANSFER - OUT REPORT:    Verbal report given to Elizabeth(name) on Melida Mandujano  being transferred to (unit) for routine progression of care       Report consisted of patients Situation, Background, Assessment and   Recommendations(SBAR). Information from the following report(s) SBAR and ED Summary was reviewed with the receiving nurse. Lines:       Opportunity for questions and clarification was provided.       Patient transported with:   Registered Nurse

## 2019-10-27 NOTE — ED NOTES
Emma Yang MD at bedside to obtain IV access with ultrasound machine. IV on right AC. Lab specimen collected.

## 2019-10-27 NOTE — ED NOTES
Bedside and Verbal shift change report given to Vale Blake Alexandria Patrick (oncoming nurse) by Wendall Meigs, RN (offgoing nurse). Report included the following information SBAR, ED Summary, MAR and Recent Results.

## 2019-10-27 NOTE — H&P
[address]   HISTORY AND PHYSICAL    Name:  Nicole Salazar  MR#:  [id number]  :  1960  ACCOUNT #:  [de-identified]  ADMIT DATE:  10/26/2019    The patient was seen, evaluated and admitted by me on 10/27/2019. PRIMARY CARE PHYSICIAN:  Malik Ford MD    SOURCE OF INFORMATION:  The patient. CHIEF COMPLAINT:  Nausea, vomiting and diarrhea. HISTORY OF PRESENT ILLNESS:  This is a 61-year-old woman with a past medical history see significant for sickle cell anemia, chronic pain syndrome, depression, hepatitis C infection, was in her usual state of health until the day of her presentation at the emergency room when the patient developed nausea, vomiting and diarrhea. The patient's symptoms started at about 9:30 a.m. this morning. The patient denies eating any unusual food. No sick contacts. No associated abdominal pain. No fever, no rigors, no chills. The patient did not describe blood or mucoid in the stool. No recent exposure to antibiotics. The patient also complained of right elbow pain. According to her, this is similar to sickle cell pain crisis. No history of trauma to the right elbow. When the patient arrived at the emergency room, the patient was treated conservatively without any significant improvement. The patient was referred to the hospitalist service for evaluation for admission. The patient was recently admitted to this hospital from 10/03/2019 to 10/17/2019. The patient was admitted and treated for sickle cell pain crisis. PAST MEDICAL HISTORY:  1. Hypertension. 2.  Sickle cell anemia. 3.  Depression. 4.  Chronic pain syndrome. 5.  Hepatitis C infection, treated and cured. ALLERGIES:  THE PATIENT IS ALLERGIC TO DILAUDID AND PERCOCET. CURRENT MEDICATION:  1. Celexa 10 mg daily at bedtime. 2.  Fentanyl 75 mcg applied to skin every 72 hours. 3.  Folic acid 1 mg daily. 4.  Norco  one tablet every 6 hours as needed for pain.   5.  Hydrea 500 mg twice a day. 6.  Prilosec 20 mg daily at bedtime. 7.  Phenergan 25 mg every 8 hours as needed for nausea. 8.  Micardis 40 mg daily. FAMILY HISTORY:  This was reviewed. Her mother and father have hypertension. PAST SURGICAL HISTORY:  This is significant for cholecystectomy, benign cyst removal from the breast.    SOCIAL HISTORY:  No history of alcohol or tobacco abuse. REVIEW OF SYSTEMS:  HEAD, EYES, EARS, NOSE AND THROAT:  No headache, no dizziness, no blurring of vision, no photophobia. RESPIRATORY SYSTEM:  No cough, no shortness of breath, no hemoptysis. CARDIOVASCULAR SYSTEM:  No chest pain, no orthopnea, no palpitation. GASTROINTESTINAL SYSTEM:  This is positive for nausea, vomiting and diarrhea. No abdominal pain. No constipation. GENITOURINARY SYSTEM:  No dysuria, no urgency and no frequency. All other systems are reviewed and they are negative. PHYSICAL EXAMINATION:  GENERAL APPEARANCE:  The patient appeared ill in moderate distress. VITAL SIGNS:  On arrival at the emergency room, temperature 98.7, pulse 95, respiratory rate 18, blood pressure 162/87, oxygen saturation 100% on room air. HEAD:  Normocephalic, atraumatic. EYES:  Normal eye movements. No redness, no drainage, no discharge. EARS:  Normal external ears with no evidence of drainage. NOSE: No deformity, no drainage. MOUTH AND THROAT:  No visible oral lesion. NECK:  Neck is supple. No JVD, no thyromegaly. CHEST:  Clear breath sounds. No wheezing, no crackles. HEART:  Normal S1 and S2, regular. No clinically appreciable murmur. ABDOMEN:  Soft, nontender. Normal bowel sounds. CNS:  Alert, oriented x3. No gross focal neurological deficit. EXTREMITIES:  No edema. Pulses 2+ bilaterally. MUSCULOSKELETAL SYSTEM:  No evidence of joint deformity or swelling. SKIN:  No active skin lesions seen in the exposed parts of the body. PSYCHIATRY:  Normal mood and affect.   LYMPHATIC SYSTEM:  No cervical lymphadenopathy. DIAGNOSTIC DATA:  X-ray of the right elbow shows mild soft tissue infiltration likely at the site of the IV. LABORATORY DATA:  Chemistry:  Sodium 137, potassium 4.1, chloride 108, CO2 18, glucose 141, BUN 15, creatinine 0.81, calcium 9.8, bilirubin total 2.5, ALT 24, AST 61. Specimen reported as hemolyzed. Alkaline phosphatase 212, total protein 9.6, albumin level 4.3, globulin at 5.3. Hematology:  WBC 12.4, hemoglobin at 9.5, hematocrit 27.1, platelets 602. Lipase level 42. ASSESSMENT:  1. Acute gastroenteritis. 2.  Sickle cell pain crisis. 3.  Hypertension. 4.  Anemia. 5.  Hyperglycemia. 6.  Depression. PLAN:  1. Acute gastroenteritis. We will admit the patient for further evaluation and treatment. We will carry out conservative therapy, which includes fluid therapy, pain control. We will obtain a CT scan of the abdomen and pelvis for further evaluation. Gastroenterology consult will be requested. The patient may benefit from an EGD to evaluate the patient for possible gastritis. We will monitor the patient closely. 2.  Sickle cell pain crisis. We will carry out pain control, fluid therapy. 3.  Hypertension. We will resume preadmission medication. The patient will also be placed on hydralazine as needed for better blood pressure control. 4.  Anemia. This is most likely due to sickle cell. We will carry out anemia workup including checking a stool guaiac to rule out occult GI bleed. 5.  Hyperglycemia. We will check hemoglobin A1c level. The patient has no history of diabetes. 6.  Depression. We will continue with preadmission medication. 7.  Central line was requested and placed by the Anesthesiology because several attempts were made to establish access line and failed. OTHER ISSUES:  Code status, the patient is a full code. We will place the patient on heparin for DVT prophylaxis.     FUNCTIONAL STATUS PRIOR TO ADMISSION:  The patient is ambulatory without assistant or device.       MD NAHOMY Calero [id number]  D:  10/27/2019 5:30  T:  10/27/2019 7:07  JOB #:  [id number]  CC:  Sandra Arciniega MD

## 2019-10-27 NOTE — ED NOTES
Patient called out stating that IV site was \"swelling and hurting. \" RN to bedside to assess. IV infiltrated.

## 2019-10-28 LAB
ANION GAP SERPL CALC-SCNC: 5 MMOL/L (ref 5–15)
BUN SERPL-MCNC: 12 MG/DL (ref 6–20)
BUN/CREAT SERPL: 14 (ref 12–20)
CALCIUM SERPL-MCNC: 8.4 MG/DL (ref 8.5–10.1)
CHLORIDE SERPL-SCNC: 108 MMOL/L (ref 97–108)
CO2 SERPL-SCNC: 25 MMOL/L (ref 21–32)
CREAT SERPL-MCNC: 0.86 MG/DL (ref 0.55–1.02)
ERYTHROCYTE [DISTWIDTH] IN BLOOD BY AUTOMATED COUNT: 18.1 % (ref 11.5–14.5)
GLUCOSE SERPL-MCNC: 92 MG/DL (ref 65–100)
HCT VFR BLD AUTO: 22.4 % (ref 35–47)
HGB BLD-MCNC: 7.8 G/DL (ref 11.5–16)
MCH RBC QN AUTO: 35.3 PG (ref 26–34)
MCHC RBC AUTO-ENTMCNC: 34.8 G/DL (ref 30–36.5)
MCV RBC AUTO: 101.4 FL (ref 80–99)
NRBC # BLD: 0.96 K/UL (ref 0–0.01)
NRBC BLD-RTO: 7.9 PER 100 WBC
PLATELET # BLD AUTO: 244 K/UL (ref 150–400)
PMV BLD AUTO: 10.8 FL (ref 8.9–12.9)
POTASSIUM SERPL-SCNC: 3.2 MMOL/L (ref 3.5–5.1)
RBC # BLD AUTO: 2.21 M/UL (ref 3.8–5.2)
SODIUM SERPL-SCNC: 138 MMOL/L (ref 136–145)
WBC # BLD AUTO: 12.2 K/UL (ref 3.6–11)

## 2019-10-28 PROCEDURE — 74011250637 HC RX REV CODE- 250/637: Performed by: INTERNAL MEDICINE

## 2019-10-28 PROCEDURE — 74011000250 HC RX REV CODE- 250: Performed by: INTERNAL MEDICINE

## 2019-10-28 PROCEDURE — 74011250636 HC RX REV CODE- 250/636: Performed by: INTERNAL MEDICINE

## 2019-10-28 PROCEDURE — 85027 COMPLETE CBC AUTOMATED: CPT

## 2019-10-28 PROCEDURE — 65270000029 HC RM PRIVATE

## 2019-10-28 PROCEDURE — C9113 INJ PANTOPRAZOLE SODIUM, VIA: HCPCS | Performed by: INTERNAL MEDICINE

## 2019-10-28 PROCEDURE — 80048 BASIC METABOLIC PNL TOTAL CA: CPT

## 2019-10-28 PROCEDURE — 36415 COLL VENOUS BLD VENIPUNCTURE: CPT

## 2019-10-28 RX ADMIN — SODIUM CHLORIDE, SODIUM LACTATE, POTASSIUM CHLORIDE, AND CALCIUM CHLORIDE 125 ML/HR: 600; 310; 30; 20 INJECTION, SOLUTION INTRAVENOUS at 09:59

## 2019-10-28 RX ADMIN — HYDROMORPHONE HYDROCHLORIDE 1 MG: 1 INJECTION, SOLUTION INTRAMUSCULAR; INTRAVENOUS; SUBCUTANEOUS at 12:40

## 2019-10-28 RX ADMIN — SODIUM CHLORIDE 2.5 MG: 9 INJECTION INTRAMUSCULAR; INTRAVENOUS; SUBCUTANEOUS at 12:37

## 2019-10-28 RX ADMIN — DIPHENHYDRAMINE HYDROCHLORIDE 12.5 MG: 50 INJECTION, SOLUTION INTRAMUSCULAR; INTRAVENOUS at 09:43

## 2019-10-28 RX ADMIN — HYDROMORPHONE HYDROCHLORIDE 1 MG: 1 INJECTION, SOLUTION INTRAMUSCULAR; INTRAVENOUS; SUBCUTANEOUS at 01:12

## 2019-10-28 RX ADMIN — HEPARIN SODIUM 5000 UNITS: 5000 INJECTION INTRAVENOUS; SUBCUTANEOUS at 19:33

## 2019-10-28 RX ADMIN — HYDROMORPHONE HYDROCHLORIDE 1 MG: 1 INJECTION, SOLUTION INTRAMUSCULAR; INTRAVENOUS; SUBCUTANEOUS at 05:28

## 2019-10-28 RX ADMIN — HYDROXYUREA 500 MG: 500 CAPSULE ORAL at 18:09

## 2019-10-28 RX ADMIN — SODIUM CHLORIDE, SODIUM LACTATE, POTASSIUM CHLORIDE, AND CALCIUM CHLORIDE 125 ML/HR: 600; 310; 30; 20 INJECTION, SOLUTION INTRAVENOUS at 18:08

## 2019-10-28 RX ADMIN — DIPHENHYDRAMINE HYDROCHLORIDE 12.5 MG: 50 INJECTION, SOLUTION INTRAMUSCULAR; INTRAVENOUS at 19:28

## 2019-10-28 RX ADMIN — CITALOPRAM HYDROBROMIDE 10 MG: 20 TABLET ORAL at 18:08

## 2019-10-28 RX ADMIN — Medication 10 ML: at 05:35

## 2019-10-28 RX ADMIN — HYDROMORPHONE HYDROCHLORIDE 1 MG: 1 INJECTION, SOLUTION INTRAMUSCULAR; INTRAVENOUS; SUBCUTANEOUS at 22:41

## 2019-10-28 RX ADMIN — DIPHENHYDRAMINE HYDROCHLORIDE 12.5 MG: 50 INJECTION, SOLUTION INTRAMUSCULAR; INTRAVENOUS at 01:12

## 2019-10-28 RX ADMIN — HYDROMORPHONE HYDROCHLORIDE 1 MG: 1 INJECTION, SOLUTION INTRAMUSCULAR; INTRAVENOUS; SUBCUTANEOUS at 09:43

## 2019-10-28 RX ADMIN — FOLIC ACID 1 MG: 1 TABLET ORAL at 22:39

## 2019-10-28 RX ADMIN — DIPHENHYDRAMINE HYDROCHLORIDE 12.5 MG: 50 INJECTION, SOLUTION INTRAMUSCULAR; INTRAVENOUS at 12:40

## 2019-10-28 RX ADMIN — DIPHENHYDRAMINE HYDROCHLORIDE 12.5 MG: 50 INJECTION, SOLUTION INTRAMUSCULAR; INTRAVENOUS at 22:41

## 2019-10-28 RX ADMIN — HYDROMORPHONE HYDROCHLORIDE 1 MG: 1 INJECTION, SOLUTION INTRAMUSCULAR; INTRAVENOUS; SUBCUTANEOUS at 19:27

## 2019-10-28 RX ADMIN — Medication 10 ML: at 19:28

## 2019-10-28 RX ADMIN — HEPARIN SODIUM 5000 UNITS: 5000 INJECTION INTRAVENOUS; SUBCUTANEOUS at 05:27

## 2019-10-28 RX ADMIN — DIPHENHYDRAMINE HYDROCHLORIDE 12.5 MG: 50 INJECTION, SOLUTION INTRAMUSCULAR; INTRAVENOUS at 05:28

## 2019-10-28 RX ADMIN — DIPHENHYDRAMINE HYDROCHLORIDE 12.5 MG: 50 INJECTION, SOLUTION INTRAMUSCULAR; INTRAVENOUS at 15:52

## 2019-10-28 RX ADMIN — HYDROMORPHONE HYDROCHLORIDE 1 MG: 1 INJECTION, SOLUTION INTRAMUSCULAR; INTRAVENOUS; SUBCUTANEOUS at 15:51

## 2019-10-28 RX ADMIN — SODIUM CHLORIDE 2.5 MG: 9 INJECTION INTRAMUSCULAR; INTRAVENOUS; SUBCUTANEOUS at 19:27

## 2019-10-28 RX ADMIN — Medication 10 ML: at 22:54

## 2019-10-28 RX ADMIN — SODIUM CHLORIDE 2.5 MG: 9 INJECTION INTRAMUSCULAR; INTRAVENOUS; SUBCUTANEOUS at 05:28

## 2019-10-28 RX ADMIN — ONDANSETRON 4 MG: 2 INJECTION INTRAMUSCULAR; INTRAVENOUS at 22:49

## 2019-10-28 RX ADMIN — SODIUM CHLORIDE 40 MG: 9 INJECTION INTRAMUSCULAR; INTRAVENOUS; SUBCUTANEOUS at 09:44

## 2019-10-28 RX ADMIN — HEPARIN SODIUM 5000 UNITS: 5000 INJECTION INTRAVENOUS; SUBCUTANEOUS at 12:30

## 2019-10-28 RX ADMIN — Medication 40 ML: at 15:52

## 2019-10-28 RX ADMIN — HYDROXYUREA 500 MG: 500 CAPSULE ORAL at 09:59

## 2019-10-28 RX ADMIN — LOSARTAN POTASSIUM 50 MG: 50 TABLET ORAL at 09:48

## 2019-10-28 NOTE — PROGRESS NOTES
Called Dr. Dick Garcia office, spoke with Ms. Trell Jauregui to send message to Dr. Violeta Smith that patient has been admitted to the hospital. Marianne Mendez RN 10/28/2019 4:30 PM CDT      ----- Message -----  From: Heidy Zimmer  Sent: 10/28/2019 4:29 PM CDT  To: , *  Subject: Medication Question     Hi Dr STALEY    Hope all is well. Just wanted to send a quick question/note. I recently was 6 days late when I got my period this morning. I took a pregnancy test on Saturday that had more than a faint line indicating I was pregnant but not very dark to make it definitive so I took a digital test yesterday that said not pregnant. Of course we were hopeful and of course more than a bit disappointed today but I wanted to be proactive. I did some research and found it could have been a 'chemical pregnancy' and while most things I read indicated there was not much I could do to ensure it doesn't happen again, one site did say thyroid levels could potentially cause it. Given this, do you think it would be reasonable to come in and check my thyroid again?     Thanks so much!    Heidy

## 2019-10-28 NOTE — PROGRESS NOTES
Hospital Progress Note    NAME:  Tj Verde   :   1960   MRN:  [id number]     Date/Time:  10/28/2019 8:52 AM    Plan:   1. Pain control  2. Advance diet  Risk of Deterioration: Low  []           Moderate  [x]           High  []                 Assessment:   Principal Problem:    Sickle cell pain crisis ([address] (2018)    Active Problems:    Hypertension (10/3/2014)      Acute gastroenteritis (10/27/2019)         Admitting notes:64year-old woman with a past medical history see significant for sickle cell anemia, chronic pain syndrome, depression, hepatitis C infection, was in her usual state of health until the day of her presentation at the emergency room when the patient developed nausea, vomiting and diarrhea. The patient's symptoms started at about 9:30 a.m. this morning. The patient denies eating any unusual food. No sick contacts. No associated abdominal pain. No fever, no rigors, no chills. The patient did not describe blood or mucoid in the stool. No recent exposure to antibiotics. The patient also complained of right elbow pain. According to her, this is similar to sickle cell pain crisis. No history of trauma to the right elbow. When the patient arrived at the emergency room, the patient was treated conservatively without any significant improvement. The patient was referred to the hospitalist service for evaluation for admission. The patient was recently admitted to this hospital from 10/03/2019 to 10/17/2019. The patient was admitted and treated for sickle cell pain crisis.       Subjective:     Nausea and vomiting better  11 Point Review of Systems:   Negative except pain control adequate    []            Unable to obtain ROS due to:       []            mental status change []            sedated []            intubated     Social History     Tobacco Use    Smoking status: Never Smoker    Smokeless tobacco: Never Used   Substance Use Topics    Alcohol use: No     Medications reviewed:  Current Facility-Administered Medications   Medication Dose Route Frequency    citalopram (CELEXA) tablet 10 mg  10 mg Oral QPM    folic acid (FOLVITE) tablet 1 mg  1 mg Oral QHS    HYDROcodone-acetaminophen (NORCO)  mg tablet 1 Tab  1 Tab Oral Q6H PRN    hydroxyurea (HYDREA) chemo cap 500 mg  500 mg Oral BID    losartan (COZAAR) tablet 50 mg  50 mg Oral DAILY    sodium chloride (NS) flush 5-40 mL  5-40 mL IntraVENous Q8H    sodium chloride (NS) flush 5-40 mL  5-40 mL IntraVENous PRN    acetaminophen (TYLENOL) tablet 650 mg  650 mg Oral Q4H PRN    lactated Ringers infusion  150 mL/hr IntraVENous CONTINUOUS    ondansetron (ZOFRAN) injection 4 mg  4 mg IntraVENous Q4H PRN    bisacodyl (DULCOLAX) tablet 5 mg  5 mg Oral DAILY PRN    heparin (porcine) injection 5,000 Units  5,000 Units SubCUTAneous Q8H    pantoprazole (PROTONIX) 40 mg in sodium chloride 0.9% 10 mL injection  40 mg IntraVENous DAILY    hydrALAZINE (APRESOLINE) 20 mg/mL injection 10 mg  10 mg IntraVENous Q6H PRN    HYDROmorphone (PF) (DILAUDID) injection 1 mg  1 mg IntraVENous Q3H PRN    diphenhydrAMINE (BENADRYL) injection 12.5 mg  12.5 mg IntraVENous Q3H PRN    prochlorperazine (COMPAZINE) with saline injection 2.5 mg  2.5 mg IntraVENous Q6H PRN    fentaNYL (DURAGESIC) 75 mcg/hr patch 1 Patch  1 Patch TransDERmal Q72H        Objective:   Vitals:  Visit Vitals  BP (P) 128/69   Pulse (P) 86   Temp (P) 99.3 °F (37.4 °C)   Resp (P) 16   Ht 5' 11\" (1.803 m)   Wt 206 lb (93.4 kg)   SpO2 (P) 97%   Breastfeeding? No   BMI 28.73 kg/m²     Temp (24hrs), Av.4 °F (37.4 °C), Min:99.3 °F (37.4 °C), Max:99.6 °F (37.6 °C)      O2 Device: Room air    Last 24hr Input/Output:    Intake/Output Summary (Last 24 hours) at 10/28/2019 0852  Last data filed at 10/27/2019 0855  Gross per 24 hour   Intake 240 ml   Output    Net 240 ml        PHYSICAL EXAM:  General:    Alert, cooperative, no distress, appears stated age.      Head: Normocephalic, without obvious abnormality, atraumatic. Eyes:   Conjunctivae/corneas clear. PERRLA  Nose:  Nares normal. No drainage or sinus tenderness. Throat:    Lips, mucosa, and tongue normal.  No Thrush  Neck:  Supple, symmetrical,  no adenopathy, thyroid: non tender    no carotid bruit and no JVD. Back:    Symmetric,  No CVA tenderness. Lungs:   Clear to auscultation bilaterally. No Wheezing or Rhonchi. No rales. Chest wall:  No tenderness or deformity. No Accessory muscle use. Heart:   Regular rate and rhythm,  no murmur, rub or gallop. Abdomen:   Soft, non-tender. Not distended. Bowel sounds normal. No masses         Lab Data Reviewed:    Recent Labs     10/28/19  0524 10/27/19  0347 10/26/19  2228   WBC 12.2* 11.0 12.4*   HGB 7.8* 9.6* 9.5*   HCT 22.4* 27.0* 27.1*    382 391     Recent Labs     10/28/19  0524 10/27/19  0347 10/26/19  2228    139 137   K 3.2* 3.0* 4.1    108 108   CO2 25 19* 18*   GLU 92 139* 141*   BUN 12 16 15   CREA 0.86 0.85 0.81   CA 8.4* 9.8 9.8   MG  --  1.9  --    PHOS  --  2.1*  --    ALB  --  4.4 4.3   TBILI  --  2.1* 2.5*   SGOT  --  47* 61*   ALT  --  22 24     Lab Results   Component Value Date/Time    Glucose (POC) 138 (H) 10/06/2019 09:04 PM    Glucose (POC) 110 (H) 05/30/2019 08:04 AM    Glucose (POC) 116 (H) 02/12/2019 04:43 PM    Glucose (POC) 126 (H) 02/12/2019 11:44 AM    Glucose (POC) 109 (H) 02/12/2019 07:37 AM     No results for input(s): PH, PCO2, PO2, HCO3, FIO2 in the last 72 hours. No results for input(s): INR, INREXT in the last 72 hours.   ___________________________________________________  ___________________________________________________    Attending Physician: Marta Hashimoto, MD

## 2019-10-28 NOTE — PROGRESS NOTES
Bedside and Verbal shift change report given to Elizabeth (oncoming nurse) by Zackary Bal (offgoing nurse). Report included the following information SBAR, Kardex and MAR.

## 2019-10-28 NOTE — PROGRESS NOTES
[address]  [address] [facility] [address] [facility], [address]  [phone]                GI PROGRESS NOTE  Ana Maria Barrow, [facility]  Work Cell: [phone]      NAME:   Nolan Marion YOB: 1960   MRN:    [id number]     Assessment/Plan   1. Abdominal pain, n/v and diarrhea - suspect related to an acute infectious gastroenteritis, as symptoms have all resolved. - Supportive management per primary team    2. Sickle cell crisis  3. HTN    Will see PRN. Please call with any questions. Patient Active Problem List   Diagnosis Code    Hypertension I10    Venous insufficiency I87.2    Depression F32.9    Sickle cell crisis (HCC) D57.00    Pulsatile tinnitus H93. A9    Carpal tunnel syndrome of right wrist G56.01    Sickle cell pain crisis ([address] D57.00    Overweight (BMI 25.0-29. 9) E66.3    Sickle cell disease (HCC) D57.1    Class 1 obesity due to excess calories with serious comorbidity and body mass index (BMI) of 30.0 to 30.9 in adult E66.09, Z68.30    Acute gastroenteritis K52.9       Subjective:     Feeling better. Denies any n/v, abdominal pain or diarrhea. Objective:     VITALS:   Last 24hrs VS reviewed since prior hospitalist progress note. Most recent are:  Visit Vitals  BP (P) 128/69   Pulse (P) 86   Temp (P) 99.3 °F (37.4 °C)   Resp (P) 16   Ht 5' 11\" (1.803 m)   Wt 93.4 kg (206 lb)   SpO2 (P) 97%   Breastfeeding?  No   BMI 28.73 kg/m²     No intake or output data in the 24 hours ending 10/28/19 1110     PHYSICAL EXAM:  General   well developed, alert, in no acute distress  EENT  Normocephalic, Atraumatic, PERRLA, EOMI, sclera clear  Respiratory   Clear To Auscultation bilaterally - no wheezes, rales, rhonchi, or crackles  Cardiology  Regular Rate and Rythmn  - no murmurs, rubs or gallops  Abdominal  Soft, non-tender, non-distended, positive bowel sounds, no hepatosplenomegaly, no palpable mass  Neurological  No focal neurological deficits noted  Psychological Oriented x 3. Normal affect.        Lab Data   Recent Results (from the past 12 hour(s))   METABOLIC PANEL, BASIC    Collection Time: 10/28/19  5:24 AM   Result Value Ref Range    Sodium 138 136 - 145 mmol/L    Potassium 3.2 (L) 3.5 - 5.1 mmol/L    Chloride 108 97 - 108 mmol/L    CO2 25 21 - 32 mmol/L    Anion gap 5 5 - 15 mmol/L    Glucose 92 65 - 100 mg/dL    BUN 12 6 - 20 MG/DL    Creatinine 0.86 0.55 - 1.02 MG/DL    BUN/Creatinine ratio 14 12 - 20      GFR est AA >60 >60 ml/min/1.73m2    GFR est non-AA >60 >60 ml/min/1.73m2    Calcium 8.4 (L) 8.5 - 10.1 MG/DL   CBC W/O DIFF    Collection Time: 10/28/19  5:24 AM   Result Value Ref Range    WBC 12.2 (H) 3.6 - 11.0 K/uL    RBC 2.21 (L) 3.80 - 5.20 M/uL    HGB 7.8 (L) 11.5 - 16.0 g/dL    HCT 22.4 (L) 35.0 - 47.0 %    .4 (H) 80.0 - 99.0 FL    MCH 35.3 (H) 26.0 - 34.0 PG    MCHC 34.8 30.0 - 36.5 g/dL    RDW 18.1 (H) 11.5 - 14.5 %    PLATELET 725 721 - 020 K/uL    MPV 10.8 8.9 - 12.9 FL    NRBC 7.9 (H) 0  WBC    ABSOLUTE NRBC 0.96 (H) 0.00 - 0.01 K/uL         Medications: Reviewed    PMH/ reviewed - no change compared to H&P  Mid-Level Provider: Paulette Litten, NP   Date/Time:  10/28/2019

## 2019-10-28 NOTE — PROGRESS NOTES
Bedside shift change report given to Vega Barba RN (oncoming nurse) by Sofiya Wood RN (offgoing nurse). Report included the following information SBAR, MAR and Recent Results.

## 2019-10-29 LAB
ALBUMIN SERPL-MCNC: 3.3 G/DL (ref 3.5–5)
ALBUMIN/GLOB SERPL: 0.8 {RATIO} (ref 1.1–2.2)
ALP SERPL-CCNC: 160 U/L (ref 45–117)
ALT SERPL-CCNC: 23 U/L (ref 12–78)
ANION GAP SERPL CALC-SCNC: 4 MMOL/L (ref 5–15)
AST SERPL-CCNC: 42 U/L (ref 15–37)
BACTERIA SPEC CULT: ABNORMAL
BACTERIA SPEC CULT: ABNORMAL
BASOPHILS # BLD: 0 K/UL (ref 0–0.1)
BASOPHILS NFR BLD: 0 % (ref 0–1)
BILIRUB SERPL-MCNC: 1.6 MG/DL (ref 0.2–1)
BUN SERPL-MCNC: 7 MG/DL (ref 6–20)
BUN/CREAT SERPL: 10 (ref 12–20)
CALCIUM SERPL-MCNC: 8.1 MG/DL (ref 8.5–10.1)
CHLORIDE SERPL-SCNC: 111 MMOL/L (ref 97–108)
CO2 SERPL-SCNC: 28 MMOL/L (ref 21–32)
CREAT SERPL-MCNC: 0.71 MG/DL (ref 0.55–1.02)
DIFFERENTIAL METHOD BLD: ABNORMAL
EOSINOPHIL # BLD: 0.2 K/UL (ref 0–0.4)
EOSINOPHIL NFR BLD: 2 % (ref 0–7)
ERYTHROCYTE [DISTWIDTH] IN BLOOD BY AUTOMATED COUNT: 18.6 % (ref 11.5–14.5)
GLOBULIN SER CALC-MCNC: 4 G/DL (ref 2–4)
GLUCOSE SERPL-MCNC: 88 MG/DL (ref 65–100)
HCT VFR BLD AUTO: 22.1 % (ref 35–47)
HGB BLD-MCNC: 7.6 G/DL (ref 11.5–16)
IMM GRANULOCYTES # BLD AUTO: 0 K/UL (ref 0–0.04)
IMM GRANULOCYTES NFR BLD AUTO: 0 % (ref 0–0.5)
LYMPHOCYTES # BLD: 6.8 K/UL (ref 0.8–3.5)
LYMPHOCYTES NFR BLD: 64 % (ref 12–49)
MCH RBC QN AUTO: 35.7 PG (ref 26–34)
MCHC RBC AUTO-ENTMCNC: 34.4 G/DL (ref 30–36.5)
MCV RBC AUTO: 103.8 FL (ref 80–99)
MONOCYTES # BLD: 0.9 K/UL (ref 0–1)
MONOCYTES NFR BLD: 8 % (ref 5–13)
NEUTS SEG # BLD: 2.8 K/UL (ref 1.8–8)
NEUTS SEG NFR BLD: 26 % (ref 32–75)
NRBC # BLD: 0.62 K/UL (ref 0–0.01)
NRBC BLD-RTO: 5.8 PER 100 WBC
PLATELET # BLD AUTO: 234 K/UL (ref 150–400)
PMV BLD AUTO: 11.3 FL (ref 8.9–12.9)
POTASSIUM SERPL-SCNC: 3.2 MMOL/L (ref 3.5–5.1)
PROT SERPL-MCNC: 7.3 G/DL (ref 6.4–8.2)
RBC # BLD AUTO: 2.13 M/UL (ref 3.8–5.2)
RBC MORPH BLD: ABNORMAL
RETICS # AUTO: 0.12 M/UL (ref 0.02–0.08)
RETICS/RBC NFR AUTO: 5.4 % (ref 0.7–2.1)
SERVICE CMNT-IMP: ABNORMAL
SODIUM SERPL-SCNC: 143 MMOL/L (ref 136–145)
WBC # BLD AUTO: 10.7 K/UL (ref 3.6–11)

## 2019-10-29 PROCEDURE — 80053 COMPREHEN METABOLIC PANEL: CPT

## 2019-10-29 PROCEDURE — 74011250636 HC RX REV CODE- 250/636: Performed by: INTERNAL MEDICINE

## 2019-10-29 PROCEDURE — 74011250637 HC RX REV CODE- 250/637: Performed by: INTERNAL MEDICINE

## 2019-10-29 PROCEDURE — 65270000029 HC RM PRIVATE

## 2019-10-29 PROCEDURE — 36415 COLL VENOUS BLD VENIPUNCTURE: CPT

## 2019-10-29 PROCEDURE — 85045 AUTOMATED RETICULOCYTE COUNT: CPT

## 2019-10-29 PROCEDURE — C9113 INJ PANTOPRAZOLE SODIUM, VIA: HCPCS | Performed by: INTERNAL MEDICINE

## 2019-10-29 PROCEDURE — 74011000250 HC RX REV CODE- 250: Performed by: INTERNAL MEDICINE

## 2019-10-29 PROCEDURE — 85025 COMPLETE CBC W/AUTO DIFF WBC: CPT

## 2019-10-29 RX ADMIN — DIPHENHYDRAMINE HYDROCHLORIDE 12.5 MG: 50 INJECTION, SOLUTION INTRAMUSCULAR; INTRAVENOUS at 01:40

## 2019-10-29 RX ADMIN — DIPHENHYDRAMINE HYDROCHLORIDE 12.5 MG: 50 INJECTION, SOLUTION INTRAMUSCULAR; INTRAVENOUS at 23:32

## 2019-10-29 RX ADMIN — ONDANSETRON 4 MG: 2 INJECTION INTRAMUSCULAR; INTRAVENOUS at 04:39

## 2019-10-29 RX ADMIN — SODIUM CHLORIDE, SODIUM LACTATE, POTASSIUM CHLORIDE, AND CALCIUM CHLORIDE 75 ML/HR: 600; 310; 30; 20 INJECTION, SOLUTION INTRAVENOUS at 10:56

## 2019-10-29 RX ADMIN — DIPHENHYDRAMINE HYDROCHLORIDE 12.5 MG: 50 INJECTION, SOLUTION INTRAMUSCULAR; INTRAVENOUS at 13:55

## 2019-10-29 RX ADMIN — HYDROXYUREA 500 MG: 500 CAPSULE ORAL at 20:52

## 2019-10-29 RX ADMIN — DIPHENHYDRAMINE HYDROCHLORIDE 12.5 MG: 50 INJECTION, SOLUTION INTRAMUSCULAR; INTRAVENOUS at 20:33

## 2019-10-29 RX ADMIN — HYDROMORPHONE HYDROCHLORIDE 1 MG: 1 INJECTION, SOLUTION INTRAMUSCULAR; INTRAVENOUS; SUBCUTANEOUS at 17:16

## 2019-10-29 RX ADMIN — HYDROMORPHONE HYDROCHLORIDE 1 MG: 1 INJECTION, SOLUTION INTRAMUSCULAR; INTRAVENOUS; SUBCUTANEOUS at 07:42

## 2019-10-29 RX ADMIN — SODIUM CHLORIDE 2.5 MG: 9 INJECTION INTRAMUSCULAR; INTRAVENOUS; SUBCUTANEOUS at 13:55

## 2019-10-29 RX ADMIN — FOLIC ACID 1 MG: 1 TABLET ORAL at 22:34

## 2019-10-29 RX ADMIN — SODIUM CHLORIDE 2.5 MG: 9 INJECTION INTRAMUSCULAR; INTRAVENOUS; SUBCUTANEOUS at 20:32

## 2019-10-29 RX ADMIN — HYDROMORPHONE HYDROCHLORIDE 1 MG: 1 INJECTION, SOLUTION INTRAMUSCULAR; INTRAVENOUS; SUBCUTANEOUS at 10:49

## 2019-10-29 RX ADMIN — SODIUM CHLORIDE 40 MG: 9 INJECTION INTRAMUSCULAR; INTRAVENOUS; SUBCUTANEOUS at 09:19

## 2019-10-29 RX ADMIN — DIPHENHYDRAMINE HYDROCHLORIDE 12.5 MG: 50 INJECTION, SOLUTION INTRAMUSCULAR; INTRAVENOUS at 10:49

## 2019-10-29 RX ADMIN — Medication 10 ML: at 06:00

## 2019-10-29 RX ADMIN — CITALOPRAM HYDROBROMIDE 10 MG: 20 TABLET ORAL at 17:19

## 2019-10-29 RX ADMIN — Medication 40 ML: at 13:56

## 2019-10-29 RX ADMIN — SODIUM CHLORIDE, SODIUM LACTATE, POTASSIUM CHLORIDE, AND CALCIUM CHLORIDE 75 ML/HR: 600; 310; 30; 20 INJECTION, SOLUTION INTRAVENOUS at 23:46

## 2019-10-29 RX ADMIN — HEPARIN SODIUM 5000 UNITS: 5000 INJECTION INTRAVENOUS; SUBCUTANEOUS at 04:34

## 2019-10-29 RX ADMIN — SODIUM CHLORIDE, SODIUM LACTATE, POTASSIUM CHLORIDE, AND CALCIUM CHLORIDE 125 ML/HR: 600; 310; 30; 20 INJECTION, SOLUTION INTRAVENOUS at 02:23

## 2019-10-29 RX ADMIN — HYDROXYUREA 500 MG: 500 CAPSULE ORAL at 09:36

## 2019-10-29 RX ADMIN — HYDROMORPHONE HYDROCHLORIDE 1 MG: 1 INJECTION, SOLUTION INTRAMUSCULAR; INTRAVENOUS; SUBCUTANEOUS at 13:55

## 2019-10-29 RX ADMIN — LOSARTAN POTASSIUM 50 MG: 50 TABLET ORAL at 09:18

## 2019-10-29 RX ADMIN — DIPHENHYDRAMINE HYDROCHLORIDE 12.5 MG: 50 INJECTION, SOLUTION INTRAMUSCULAR; INTRAVENOUS at 04:39

## 2019-10-29 RX ADMIN — SODIUM CHLORIDE 2.5 MG: 9 INJECTION INTRAMUSCULAR; INTRAVENOUS; SUBCUTANEOUS at 07:42

## 2019-10-29 RX ADMIN — HYDROMORPHONE HYDROCHLORIDE 1 MG: 1 INJECTION, SOLUTION INTRAMUSCULAR; INTRAVENOUS; SUBCUTANEOUS at 04:39

## 2019-10-29 RX ADMIN — HYDROMORPHONE HYDROCHLORIDE 1 MG: 1 INJECTION, SOLUTION INTRAMUSCULAR; INTRAVENOUS; SUBCUTANEOUS at 01:40

## 2019-10-29 RX ADMIN — DIPHENHYDRAMINE HYDROCHLORIDE 12.5 MG: 50 INJECTION, SOLUTION INTRAMUSCULAR; INTRAVENOUS at 07:42

## 2019-10-29 RX ADMIN — HYDROMORPHONE HYDROCHLORIDE 1 MG: 1 INJECTION, SOLUTION INTRAMUSCULAR; INTRAVENOUS; SUBCUTANEOUS at 23:32

## 2019-10-29 RX ADMIN — SODIUM CHLORIDE 2.5 MG: 9 INJECTION INTRAMUSCULAR; INTRAVENOUS; SUBCUTANEOUS at 01:40

## 2019-10-29 RX ADMIN — Medication 10 ML: at 22:34

## 2019-10-29 RX ADMIN — HYDROMORPHONE HYDROCHLORIDE 1 MG: 1 INJECTION, SOLUTION INTRAMUSCULAR; INTRAVENOUS; SUBCUTANEOUS at 20:32

## 2019-10-29 RX ADMIN — HEPARIN SODIUM 5000 UNITS: 5000 INJECTION INTRAVENOUS; SUBCUTANEOUS at 11:59

## 2019-10-29 RX ADMIN — HEPARIN SODIUM 5000 UNITS: 5000 INJECTION INTRAVENOUS; SUBCUTANEOUS at 20:33

## 2019-10-29 NOTE — PROGRESS NOTES
Transition of Care Plan     · Home with family assistance once medically stable  · Transport: Brother Barbara Schneider [phone]  · Continue Medical Care   · Follow up with PCP/Specialist     Medical management continues for sickle cell crises. Anticipate discharge home when cleared medically.     [address]

## 2019-10-29 NOTE — PROGRESS NOTES
Problem: Falls - Risk of  Goal: *Absence of Falls  Description  Document Rebbecca Persons Fall Risk and appropriate interventions in the flowsheet.   Outcome: Progressing Towards Goal  Note:   Fall Risk Interventions:            Medication Interventions: Teach patient to arise slowly                   Problem: Patient Education: Go to Patient Education Activity  Goal: Patient/Family Education  Outcome: Progressing Towards Goal

## 2019-10-29 NOTE — PROGRESS NOTES
Bedside shift change report given to Rasheed Dominguez RN (oncoming nurse) by Linsey Ge RN (offgoing nurse). Report included the following information SBAR, MAR and Recent Results.

## 2019-10-29 NOTE — PROGRESS NOTES
Faculty or Preceptor Review of Student Work    10/29/2019  - Shift times - 0700 to 1230    The student documentation of patient care for Shahla Linn has been reviewed and approved. All medications have been administered under the direct supervision of the faculty or preceptor.     Levi Osuna RN

## 2019-10-29 NOTE — PROGRESS NOTES
Bedside shift change report given to [address] (oncoming nurse) by Ricky Sims (offgoing nurse). Report included the following information SBAR, Kardex, MAR and Recent Results.

## 2019-10-30 LAB
BASOPHILS # BLD: 0 K/UL (ref 0–0.1)
BASOPHILS NFR BLD: 0 % (ref 0–1)
DIFFERENTIAL METHOD BLD: ABNORMAL
EOSINOPHIL # BLD: 0.4 K/UL (ref 0–0.4)
EOSINOPHIL NFR BLD: 4 % (ref 0–7)
ERYTHROCYTE [DISTWIDTH] IN BLOOD BY AUTOMATED COUNT: 18.6 % (ref 11.5–14.5)
HCT VFR BLD AUTO: 22.6 % (ref 35–47)
HGB BLD-MCNC: 7.6 G/DL (ref 11.5–16)
IMM GRANULOCYTES # BLD AUTO: 0 K/UL (ref 0–0.04)
IMM GRANULOCYTES NFR BLD AUTO: 0 % (ref 0–0.5)
LYMPHOCYTES # BLD: 5.6 K/UL (ref 0.8–3.5)
LYMPHOCYTES NFR BLD: 64 % (ref 12–49)
MCH RBC QN AUTO: 35 PG (ref 26–34)
MCHC RBC AUTO-ENTMCNC: 33.6 G/DL (ref 30–36.5)
MCV RBC AUTO: 104.1 FL (ref 80–99)
MONOCYTES # BLD: 0.8 K/UL (ref 0–1)
MONOCYTES NFR BLD: 9 % (ref 5–13)
NEUTS SEG # BLD: 2 K/UL (ref 1.8–8)
NEUTS SEG NFR BLD: 23 % (ref 32–75)
NRBC # BLD: 0.4 K/UL (ref 0–0.01)
NRBC BLD-RTO: 4.6 PER 100 WBC
PLATELET # BLD AUTO: 223 K/UL (ref 150–400)
PLATELET COMMENTS,PCOM: ABNORMAL
PMV BLD AUTO: 11.2 FL (ref 8.9–12.9)
RBC # BLD AUTO: 2.17 M/UL (ref 3.8–5.2)
RBC MORPH BLD: ABNORMAL
WBC # BLD AUTO: 8.8 K/UL (ref 3.6–11)

## 2019-10-30 PROCEDURE — 74011250637 HC RX REV CODE- 250/637: Performed by: INTERNAL MEDICINE

## 2019-10-30 PROCEDURE — 74011000250 HC RX REV CODE- 250: Performed by: INTERNAL MEDICINE

## 2019-10-30 PROCEDURE — 85025 COMPLETE CBC W/AUTO DIFF WBC: CPT

## 2019-10-30 PROCEDURE — 74011250636 HC RX REV CODE- 250/636: Performed by: INTERNAL MEDICINE

## 2019-10-30 PROCEDURE — 36415 COLL VENOUS BLD VENIPUNCTURE: CPT

## 2019-10-30 PROCEDURE — C9113 INJ PANTOPRAZOLE SODIUM, VIA: HCPCS | Performed by: INTERNAL MEDICINE

## 2019-10-30 PROCEDURE — 65270000029 HC RM PRIVATE

## 2019-10-30 PROCEDURE — 94760 N-INVAS EAR/PLS OXIMETRY 1: CPT

## 2019-10-30 RX ADMIN — SODIUM CHLORIDE 2.5 MG: 9 INJECTION INTRAMUSCULAR; INTRAVENOUS; SUBCUTANEOUS at 03:04

## 2019-10-30 RX ADMIN — DIPHENHYDRAMINE HYDROCHLORIDE 12.5 MG: 50 INJECTION, SOLUTION INTRAMUSCULAR; INTRAVENOUS at 09:06

## 2019-10-30 RX ADMIN — HYDROMORPHONE HYDROCHLORIDE 1 MG: 1 INJECTION, SOLUTION INTRAMUSCULAR; INTRAVENOUS; SUBCUTANEOUS at 15:44

## 2019-10-30 RX ADMIN — HYDROMORPHONE HYDROCHLORIDE 1 MG: 1 INJECTION, SOLUTION INTRAMUSCULAR; INTRAVENOUS; SUBCUTANEOUS at 09:08

## 2019-10-30 RX ADMIN — SODIUM CHLORIDE 2.5 MG: 9 INJECTION INTRAMUSCULAR; INTRAVENOUS; SUBCUTANEOUS at 22:03

## 2019-10-30 RX ADMIN — DIPHENHYDRAMINE HYDROCHLORIDE 12.5 MG: 50 INJECTION, SOLUTION INTRAMUSCULAR; INTRAVENOUS at 03:04

## 2019-10-30 RX ADMIN — HEPARIN SODIUM 5000 UNITS: 5000 INJECTION INTRAVENOUS; SUBCUTANEOUS at 21:38

## 2019-10-30 RX ADMIN — HYDROMORPHONE HYDROCHLORIDE 1 MG: 1 INJECTION, SOLUTION INTRAMUSCULAR; INTRAVENOUS; SUBCUTANEOUS at 06:11

## 2019-10-30 RX ADMIN — HYDROMORPHONE HYDROCHLORIDE 1 MG: 1 INJECTION, SOLUTION INTRAMUSCULAR; INTRAVENOUS; SUBCUTANEOUS at 03:05

## 2019-10-30 RX ADMIN — DIPHENHYDRAMINE HYDROCHLORIDE 12.5 MG: 50 INJECTION, SOLUTION INTRAMUSCULAR; INTRAVENOUS at 06:12

## 2019-10-30 RX ADMIN — CITALOPRAM HYDROBROMIDE 10 MG: 20 TABLET ORAL at 17:24

## 2019-10-30 RX ADMIN — HEPARIN SODIUM 5000 UNITS: 5000 INJECTION INTRAVENOUS; SUBCUTANEOUS at 03:27

## 2019-10-30 RX ADMIN — FOLIC ACID 1 MG: 1 TABLET ORAL at 22:03

## 2019-10-30 RX ADMIN — Medication 10 ML: at 06:00

## 2019-10-30 RX ADMIN — SODIUM CHLORIDE 2.5 MG: 9 INJECTION INTRAMUSCULAR; INTRAVENOUS; SUBCUTANEOUS at 09:07

## 2019-10-30 RX ADMIN — DIPHENHYDRAMINE HYDROCHLORIDE 12.5 MG: 50 INJECTION, SOLUTION INTRAMUSCULAR; INTRAVENOUS at 15:44

## 2019-10-30 RX ADMIN — SODIUM CHLORIDE 2.5 MG: 9 INJECTION INTRAMUSCULAR; INTRAVENOUS; SUBCUTANEOUS at 15:44

## 2019-10-30 RX ADMIN — HYDROXYUREA 500 MG: 500 CAPSULE ORAL at 09:16

## 2019-10-30 RX ADMIN — ONDANSETRON 4 MG: 2 INJECTION INTRAMUSCULAR; INTRAVENOUS at 19:00

## 2019-10-30 RX ADMIN — LOSARTAN POTASSIUM 50 MG: 50 TABLET ORAL at 09:06

## 2019-10-30 RX ADMIN — Medication 10 ML: at 15:44

## 2019-10-30 RX ADMIN — DIPHENHYDRAMINE HYDROCHLORIDE 12.5 MG: 50 INJECTION, SOLUTION INTRAMUSCULAR; INTRAVENOUS at 22:03

## 2019-10-30 RX ADMIN — ONDANSETRON 4 MG: 2 INJECTION INTRAMUSCULAR; INTRAVENOUS at 06:11

## 2019-10-30 RX ADMIN — HYDROMORPHONE HYDROCHLORIDE 1 MG: 1 INJECTION, SOLUTION INTRAMUSCULAR; INTRAVENOUS; SUBCUTANEOUS at 22:03

## 2019-10-30 RX ADMIN — DIPHENHYDRAMINE HYDROCHLORIDE 12.5 MG: 50 INJECTION, SOLUTION INTRAMUSCULAR; INTRAVENOUS at 12:29

## 2019-10-30 RX ADMIN — ONDANSETRON 4 MG: 2 INJECTION INTRAMUSCULAR; INTRAVENOUS at 12:29

## 2019-10-30 RX ADMIN — SODIUM CHLORIDE, SODIUM LACTATE, POTASSIUM CHLORIDE, AND CALCIUM CHLORIDE 75 ML/HR: 600; 310; 30; 20 INJECTION, SOLUTION INTRAVENOUS at 13:15

## 2019-10-30 RX ADMIN — SODIUM CHLORIDE 40 MG: 9 INJECTION INTRAMUSCULAR; INTRAVENOUS; SUBCUTANEOUS at 09:08

## 2019-10-30 RX ADMIN — HYDROMORPHONE HYDROCHLORIDE 1 MG: 1 INJECTION, SOLUTION INTRAMUSCULAR; INTRAVENOUS; SUBCUTANEOUS at 19:00

## 2019-10-30 RX ADMIN — HYDROXYUREA 500 MG: 500 CAPSULE ORAL at 17:42

## 2019-10-30 RX ADMIN — HYDROMORPHONE HYDROCHLORIDE 1 MG: 1 INJECTION, SOLUTION INTRAMUSCULAR; INTRAVENOUS; SUBCUTANEOUS at 12:28

## 2019-10-30 RX ADMIN — DIPHENHYDRAMINE HYDROCHLORIDE 12.5 MG: 50 INJECTION, SOLUTION INTRAMUSCULAR; INTRAVENOUS at 19:00

## 2019-10-30 RX ADMIN — HEPARIN SODIUM 5000 UNITS: 5000 INJECTION INTRAVENOUS; SUBCUTANEOUS at 12:30

## 2019-10-30 RX ADMIN — Medication 10 ML: at 21:39

## 2019-10-30 NOTE — PROGRESS NOTES
Hospital Progress Note    NAME:  Nicolas Yun   :   1960   MRN:  [id number]     Date/Time:  10/29/2019 8:52 AM    Plan:   1. Pain control  2. Advance diet  Risk of Deterioration: Low  []           Moderate  [x]           High  []                 Assessment:   Principal Problem:    Sickle cell pain crisis ([address] (2018)    Active Problems:    Hypertension (10/3/2014)      Acute gastroenteritis (10/27/2019)         Admitting notes:64year-old woman with a past medical history see significant for sickle cell anemia, chronic pain syndrome, depression, hepatitis C infection, was in her usual state of health until the day of her presentation at the emergency room when the patient developed nausea, vomiting and diarrhea. The patient's symptoms started at about 9:30 a.m. this morning. The patient denies eating any unusual food. No sick contacts. No associated abdominal pain. No fever, no rigors, no chills. The patient did not describe blood or mucoid in the stool. No recent exposure to antibiotics. The patient also complained of right elbow pain. According to her, this is similar to sickle cell pain crisis. No history of trauma to the right elbow. When the patient arrived at the emergency room, the patient was treated conservatively without any significant improvement. The patient was referred to the hospitalist service for evaluation for admission. The patient was recently admitted to this hospital from 10/03/2019 to 10/17/2019. The patient was admitted and treated for sickle cell pain crisis.     Subjective:     feeling better  11 Point Review of Systems:   Negative except pain control adequate    []            Unable to obtain ROS due to:       []            mental status change []            sedated []            intubated     Social History     Tobacco Use    Smoking status: Never Smoker    Smokeless tobacco: Never Used   Substance Use Topics    Alcohol use: No     Medications reviewed:  Current Facility-Administered Medications   Medication Dose Route Frequency    citalopram (CELEXA) tablet 10 mg  10 mg Oral QPM    folic acid (FOLVITE) tablet 1 mg  1 mg Oral QHS    HYDROcodone-acetaminophen (NORCO)  mg tablet 1 Tab  1 Tab Oral Q6H PRN    hydroxyurea (HYDREA) chemo cap 500 mg  500 mg Oral BID    losartan (COZAAR) tablet 50 mg  50 mg Oral DAILY    sodium chloride (NS) flush 5-40 mL  5-40 mL IntraVENous Q8H    sodium chloride (NS) flush 5-40 mL  5-40 mL IntraVENous PRN    acetaminophen (TYLENOL) tablet 650 mg  650 mg Oral Q4H PRN    lactated Ringers infusion  75 mL/hr IntraVENous CONTINUOUS    ondansetron (ZOFRAN) injection 4 mg  4 mg IntraVENous Q4H PRN    bisacodyl (DULCOLAX) tablet 5 mg  5 mg Oral DAILY PRN    heparin (porcine) injection 5,000 Units  5,000 Units SubCUTAneous Q8H    pantoprazole (PROTONIX) 40 mg in sodium chloride 0.9% 10 mL injection  40 mg IntraVENous DAILY    hydrALAZINE (APRESOLINE) 20 mg/mL injection 10 mg  10 mg IntraVENous Q6H PRN    HYDROmorphone (PF) (DILAUDID) injection 1 mg  1 mg IntraVENous Q3H PRN    diphenhydrAMINE (BENADRYL) injection 12.5 mg  12.5 mg IntraVENous Q3H PRN    prochlorperazine (COMPAZINE) with saline injection 2.5 mg  2.5 mg IntraVENous Q6H PRN    fentaNYL (DURAGESIC) 75 mcg/hr patch 1 Patch  1 Patch TransDERmal Q72H        Objective:   Vitals:  Visit Vitals  /85 (BP 1 Location: Left arm, BP Patient Position: Sitting)   Pulse 83   Temp 99 °F (37.2 °C)   Resp 16   Ht 5' 11\" (1.803 m)   Wt 205 lb 0.4 oz (93 kg)   SpO2 96%   Breastfeeding? No   BMI 28.60 kg/m²     Temp (24hrs), Av °F (37.2 °C), Min:98.9 °F (37.2 °C), Max:99.2 °F (37.3 °C)      O2 Device: Room air    Last 24hr Input/Output:  No intake or output data in the 24 hours ending 10/29/19 2144     PHYSICAL EXAM:  General:    Alert, cooperative, no distress, appears stated age. Head:   Normocephalic, without obvious abnormality, atraumatic.   Eyes: Conjunctivae/corneas clear. PERRLA  Nose:  Nares normal. No drainage or sinus tenderness. Throat:    Lips, mucosa, and tongue normal.  No Thrush  Neck:  Supple, symmetrical,  no adenopathy, thyroid: non tender    no carotid bruit and no JVD. Back:    Symmetric,  No CVA tenderness. Lungs:   Clear to auscultation bilaterally. No Wheezing or Rhonchi. No rales. Chest wall:  No tenderness or deformity. No Accessory muscle use. Heart:   Regular rate and rhythm,  no murmur, rub or gallop. Abdomen:   Soft, non-tender. Not distended. Bowel sounds normal. No masses         Lab Data Reviewed:    Recent Labs     10/29/19  0151 10/28/19  0524 10/27/19  0347   WBC 10.7 12.2* 11.0   HGB 7.6* 7.8* 9.6*   HCT 22.1* 22.4* 27.0*    244 382     Recent Labs     10/29/19  0139 10/28/19  0524 10/27/19  0347 10/26/19  2228    138 139 137   K 3.2* 3.2* 3.0* 4.1   * 108 108 108   CO2 28 25 19* 18*   GLU 88 92 139* 141*   BUN 7 12 16 15   CREA 0.71 0.86 0.85 0.81   CA 8.1* 8.4* 9.8 9.8   MG  --   --  1.9  --    PHOS  --   --  2.1*  --    ALB 3.3*  --  4.4 4.3   TBILI 1.6*  --  2.1* 2.5*   SGOT 42*  --  47* 61*   ALT 23  --  22 24     Lab Results   Component Value Date/Time    Glucose (POC) 138 (H) 10/06/2019 09:04 PM    Glucose (POC) 110 (H) 05/30/2019 08:04 AM    Glucose (POC) 116 (H) 02/12/2019 04:43 PM    Glucose (POC) 126 (H) 02/12/2019 11:44 AM    Glucose (POC) 109 (H) 02/12/2019 07:37 AM     No results for input(s): PH, PCO2, PO2, HCO3, FIO2 in the last 72 hours. No results for input(s): INR, INREXT, INREXT in the last 72 hours.   ___________________________________________________  ___________________________________________________    Attending Physician: Mary Freedman MD

## 2019-10-30 NOTE — PROGRESS NOTES
Bedside shift change report given to [address] (oncoming nurse) by Mai Knox (offgoing nurse). Report included the following information SBAR, Kardex, MAR and Recent Results.

## 2019-10-30 NOTE — CONSULTS
[address]    Name:  Ranjana Kowalski  MR#:  [id number]  :  1960  ACCOUNT #:  [de-identified]  DATE OF SERVICE:  10/29/2019    REASON FOR ADMISSION:  Sickle cell pain crisis. REASON FOR CONSULTATION:  Sickle cell pain crisis. 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 She has had fortunately a relatively benign phenotype such that she has not required multiple hospitalizations or transfusions. When she was an adolescent, she did have osteomyelitis of the right arm and for one year received packed red blood cell transfusions every six weeks. More recently, this year, she has had multiple admissions for pain crisis and Dr. Lisa Maher has appropriately started hydroxyurea, initially 500 mg per day and more recently 500 mg twice daily. Recently, earlier this month, hemoglobin electrophoresis was done which showed a hemoglobin SS at approximately 80% with a hemoglobin F of approximately 12%. She has never had a stroke. She has never had a port. She has had her gallbladder removed. They did have difficulty finding access for her and had placed a triple-lumen catheter in her neck for access. Today, her hemoglobin is 7.6, white count 10.7, platelet count 608 with a normal creatinine and a normal bilirubin. She did have some gastrointestinal disturbance and GI was consulted. They felt this was viral gastroenteritis without further workup needed. PAST MEDICAL HISTORY:  Osteomyelitis of the right arm, status post cholecystectomy, prior hepatitis C contracted during packed red blood cell transfusions, status post Harvoni treatment with clearance of virus, high blood pressure. ALLERGIES:  DILAUDID AND PERCOCET. CURRENT MEDICATIONS:  1. Celexa 10 mg at bedtime. 2.  Duragesic patch 75 mcg S.79.  3.  Folic acid 1 mg p.o. at bedtime. 4.  Insulin 5000 units q.8.  5.  Hydroxyurea 500 mg twice daily.   6.  Cozaar 50 mg once daily. 7.  Protonix 40 mg IV. SOCIAL HISTORY:  No history of tobacco or alcohol use. FAMILY HISTORY:  Mother and father have hypertension. REVIEW OF SYSTEMS:  GENERAL:  No fevers or chills. HEENT:  No auditory or visual change. LYMPHATIC:  No lumps or bumps in neck, under arm, or groin. CARDIOVASCULAR:  No chest pain or palpitations. PULMONARY:  No cough or shortness of breath. GASTROINTESTINAL:  As above. GENITOURINARY:  No dysuria or incontinence. SKIN:  No rash or changing mole. MUSCULOSKELETAL:  No red or swollen joints. NEUROLOGIC:  No irritability or syncope. PHYSICAL EXAMINATION:  GENERAL:  Pleasant, in no acute distress. VITAL SIGNS:  /76, pulse 80, temperature 98.9, saturating 96% on room air. HEENT:  Sclerae anicteric. Oropharynx clear. NECK:  Supple without lymphadenopathy or thyromegaly. HEART:  Regular rate and rhythm without murmur, rub, or gallop. LUNGS:  Clear to auscultation bilaterally. ABDOMEN:  Nontender and nondistended. Normoactive bowel sounds. No hepatosplenomegaly. EXTREMITIES:  No clubbing, cyanosis, or edema. PSYCH:  Normal mood and affect. Alert and oriented x 3. ASSESSMENT:  A 82-year-old woman with hemoglobin SS disease, admitted with pain crisis, now improving with p.o. narcotics and intravenous fluids. Sickle cell disorder:  I agree with current management, especially hydroxyurea. I explained to her the recent change in packed red blood cell transfusion management and told her that the current recommendations from the [address], [facility] and [address] [place] [address] are not a specific number to transfuse but for symptoms. I told her that this is mostly driven by the risk for iron overload with repeated transfusions or alloimmunity, and would not recommend a number of hemoglobin as a goal, but a symptom for packed red blood cell transfusions as long as her hemoglobin is above 5 g/dL.   Given that she is looking so well, I told her that we will most likely sign off, but are available daily if she has a new worsening symptom. Thank you for the consult.       Jud Amado MD      [id number]  D:  10/29/2019 18:40  T:  10/30/2019 0:19  JOB #:  [id number]  CC:  Pura Bowen MD

## 2019-10-30 NOTE — ROUTINE PROCESS
Bedside shift change report given to yazmin mack rn (oncoming nurse) by Ilan Oliver (offgoing nurse). Report included the following information SBAR and Kardex.

## 2019-10-30 NOTE — PROGRESS NOTES
Problem: Falls - Risk of  Goal: *Absence of Falls  Description  Document Fercho Luther Fall Risk and appropriate interventions in the flowsheet.   Outcome: Progressing Towards Goal  Note:   Fall Risk Interventions:            Medication Interventions: Teach patient to arise slowly                   Problem: Patient Education: Go to Patient Education Activity  Goal: Patient/Family Education  Outcome: Progressing Towards Goal

## 2019-10-31 VITALS
DIASTOLIC BLOOD PRESSURE: 81 MMHG | HEART RATE: 84 BPM | SYSTOLIC BLOOD PRESSURE: 145 MMHG | WEIGHT: 214.6 LBS | TEMPERATURE: 98.8 F | RESPIRATION RATE: 16 BRPM | HEIGHT: 71 IN | BODY MASS INDEX: 30.04 KG/M2 | OXYGEN SATURATION: 95 %

## 2019-10-31 PROCEDURE — 74011250636 HC RX REV CODE- 250/636: Performed by: INTERNAL MEDICINE

## 2019-10-31 PROCEDURE — 74011250637 HC RX REV CODE- 250/637: Performed by: INTERNAL MEDICINE

## 2019-10-31 PROCEDURE — C9113 INJ PANTOPRAZOLE SODIUM, VIA: HCPCS | Performed by: INTERNAL MEDICINE

## 2019-10-31 PROCEDURE — 74011000250 HC RX REV CODE- 250: Performed by: INTERNAL MEDICINE

## 2019-10-31 RX ORDER — BACITRACIN 500 UNIT/G
PACKET (EA) TOPICAL
Status: DISCONTINUED
Start: 2019-10-31 | End: 2019-10-31 | Stop reason: HOSPADM

## 2019-10-31 RX ORDER — POTASSIUM CHLORIDE 750 MG/1
40 TABLET, FILM COATED, EXTENDED RELEASE ORAL
Status: COMPLETED | OUTPATIENT
Start: 2019-10-31 | End: 2019-10-31

## 2019-10-31 RX ADMIN — HYDROMORPHONE HYDROCHLORIDE 1 MG: 1 INJECTION, SOLUTION INTRAMUSCULAR; INTRAVENOUS; SUBCUTANEOUS at 07:07

## 2019-10-31 RX ADMIN — POTASSIUM CHLORIDE 40 MEQ: 750 TABLET, FILM COATED, EXTENDED RELEASE ORAL at 10:28

## 2019-10-31 RX ADMIN — DIPHENHYDRAMINE HYDROCHLORIDE 12.5 MG: 50 INJECTION, SOLUTION INTRAMUSCULAR; INTRAVENOUS at 07:07

## 2019-10-31 RX ADMIN — SODIUM CHLORIDE 2.5 MG: 9 INJECTION INTRAMUSCULAR; INTRAVENOUS; SUBCUTANEOUS at 10:26

## 2019-10-31 RX ADMIN — DIPHENHYDRAMINE HYDROCHLORIDE 12.5 MG: 50 INJECTION, SOLUTION INTRAMUSCULAR; INTRAVENOUS at 04:05

## 2019-10-31 RX ADMIN — HYDROMORPHONE HYDROCHLORIDE 1 MG: 1 INJECTION, SOLUTION INTRAMUSCULAR; INTRAVENOUS; SUBCUTANEOUS at 04:05

## 2019-10-31 RX ADMIN — HYDROXYUREA 500 MG: 500 CAPSULE ORAL at 10:28

## 2019-10-31 RX ADMIN — DIPHENHYDRAMINE HYDROCHLORIDE 12.5 MG: 50 INJECTION, SOLUTION INTRAMUSCULAR; INTRAVENOUS at 01:04

## 2019-10-31 RX ADMIN — Medication 10 ML: at 07:12

## 2019-10-31 RX ADMIN — DIPHENHYDRAMINE HYDROCHLORIDE 12.5 MG: 50 INJECTION, SOLUTION INTRAMUSCULAR; INTRAVENOUS at 10:28

## 2019-10-31 RX ADMIN — HYDROMORPHONE HYDROCHLORIDE 1 MG: 1 INJECTION, SOLUTION INTRAMUSCULAR; INTRAVENOUS; SUBCUTANEOUS at 10:28

## 2019-10-31 RX ADMIN — HEPARIN SODIUM 5000 UNITS: 5000 INJECTION INTRAVENOUS; SUBCUTANEOUS at 03:30

## 2019-10-31 RX ADMIN — LOSARTAN POTASSIUM 50 MG: 50 TABLET ORAL at 10:28

## 2019-10-31 RX ADMIN — SODIUM CHLORIDE 40 MG: 9 INJECTION INTRAMUSCULAR; INTRAVENOUS; SUBCUTANEOUS at 10:27

## 2019-10-31 RX ADMIN — HYDROMORPHONE HYDROCHLORIDE 1 MG: 1 INJECTION, SOLUTION INTRAMUSCULAR; INTRAVENOUS; SUBCUTANEOUS at 01:04

## 2019-10-31 RX ADMIN — ONDANSETRON 4 MG: 2 INJECTION INTRAMUSCULAR; INTRAVENOUS at 04:05

## 2019-10-31 NOTE — DISCHARGE SUMMARY
[address] SUMMARY    Name:  Aleena Strickland  MR#:  [id number]  :  1960  ACCOUNT #:  [de-identified]  ADMIT DATE:  10/26/2019  DISCHARGE DATE:  10/31/2019      HISTORY OF PRESENT ILLNESS:  A 72-year-old black female with history of sickle cell anemia, chronic pain syndrome, presented to the emergency room after developing nausea, vomiting, and diarrhea. Symptoms started in the morning of admission. She also complained right elbow pain, according to her, similar to her sickle cell pain crisis. The patient was referred to the hospitalist service for evaluation and was admitted for evaluation and care. Past medical history, review of systems, and physical examination noted in the HPI. CONSULTATIONS:  Valery Gonzalez MD, Hematology/Oncology. Assessment:  Sickle cell disorder. I agree with current management especially with hydroxyurea. I explained to her the recent change in packed red blood cell transfusion management and told her that current recommendations from [address] are not a specific number to transfuse, but for symptoms. I told her this is mostly given by the risk for iron overload with repeated transfusions or alloimmunity and would not recommend a number of hemoglobin as a goal, but if symptoms of packed red cells as long as her hemoglobin is above 5. Given that it is looking like we will most likely sign off, but available if needed. Gastroenterology, Maria G Giron MD on 10/27/2019. Assessment:  Acute onset nausea, vomiting, and diarrhea, suspect infectious gastroenteritis, improved. HCV infection treated . Recommendations:  Continue current care, advance diet. No need for EGD. RESULTS REVIEW:  Hemoglobin 7.6, hematocrit 22.6, and WBC 8.6. Urinalysis unremarkable. Sodium 143, potassium 3.2, chloride 111, CO2 of 28, glucose 88, BUN 7, and creatinine 0.7. Metabolic panel otherwise unremarkable. Ferritin 275. Iron 83. Troponin less than 0.05.  B12 normal at 446. Urine for drug screen was positive for opiates. IMAGING STUDIES:  Chest x-ray, no acute process. Elbow x-ray, mild soft tissue infiltration likely due to site of IV. HOSPITAL COURSE:  The patient was admitted as stated above. She was placed on IV fluids, narcotic analgesics, and very shortly the nausea, vomiting, and diarrhea resolved. Her diet was slow advancement, and subsequently 3 days prior to discharge, she was free of nausea and vomiting. She started having most likely viral gastroenteritis on admission prompting the nausea, vomiting, and diarrhea. This very quickly resolved with dietary issues addressed, and with resolution of the symptoms, diet was advanced to back into regular diet such that at the time of discharge, she was tolerating the regular diet without difficulty. She is therefore being discharged home ambulatory in satisfactory condition with resolution of the crisis and gastroenteritis. FINAL DIAGNOSES:  1. Acute gastroenteritis. 2.  Sickle cell crisis. 3.  Overweight. 4.  Depression. 5.  Venous insufficiency. 6.  Primary hypertension. DISPOSITION:  Discharged to home ambulatory, up as tolerated. Return to see Dr. Santiago Nation in 3-5 days for followup care. DISCHARGE MEDICATIONS:  Include the followin. Celexa 10 mg daily. 2.  Fentanyl 75 mcg per hour, q.72 hours p.r.n.  3.  Folic acid 1 mg daily. 4.  Hydrocodone 10/325 q.4 hours p.r.n.  5.  Cortaid 1% topical solution p.r.n.  6.  Hydroxyurea 500 mg b.i.d.  7.  Omeprazole 20 mg daily. 8.  Phenergan 25 mg q.4 hours p.r.n.  9.  Telmisartan 40 mg daily.         Sera Pettit MD      [id number]  D:  10/31/2019 8:02  T:  10/31/2019 15:32  JOB #:  [id number]

## 2019-10-31 NOTE — PROGRESS NOTES
Problem: Falls - Risk of  Goal: *Absence of Falls  Description  Document Audrey Reinoso Fall Risk and appropriate interventions in the flowsheet.   Outcome: Progressing Towards Goal  Note:   Fall Risk Interventions:            Medication Interventions: Teach patient to arise slowly                   Problem: Patient Education: Go to Patient Education Activity  Goal: Patient/Family Education  Outcome: Progressing Towards Goal

## 2019-10-31 NOTE — PROGRESS NOTES
Hospital follow-up PCP transitional care appointment has been scheduled with Dr. Kelli Sandoval for Tuesday, 11/5/19 at 1:15 p.m. Pending patient discharge.   Yodit Jones, Care Management Specialist.

## 2019-10-31 NOTE — PROGRESS NOTES
Hospital Progress Note    NAME:  Wendy Shah   :   1960   MRN:  [id number]     Date/Time:  10/31/2019 8:52 AM    Plan:   1. Home today  2. Correct k  Risk of Deterioration: Low  [x]           Moderate  []           High  []                 Assessment:   Principal Problem:    Sickle cell pain crisis ([address] (2018)    Active Problems:    Hypertension (10/3/2014)      Acute gastroenteritis (10/27/2019)         Admitting notes:64year-old woman with a past medical history see significant for sickle cell anemia, chronic pain syndrome, depression, hepatitis C infection, was in her usual state of health until the day of her presentation at the emergency room when the patient developed nausea, vomiting and diarrhea. The patient's symptoms started at about 9:30 a.m. this morning. The patient denies eating any unusual food. No sick contacts. No associated abdominal pain. No fever, no rigors, no chills. The patient did not describe blood or mucoid in the stool. No recent exposure to antibiotics. The patient also complained of right elbow pain. According to her, this is similar to sickle cell pain crisis. No history of trauma to the right elbow. When the patient arrived at the emergency room, the patient was treated conservatively without any significant improvement. The patient was referred to the hospitalist service for evaluation for admission. The patient was recently admitted to this hospital from 10/03/2019 to 10/17/2019. The patient was admitted and treated for sickle cell pain crisis.     Subjective:     feeling better  11 Point Review of Systems:   Negative except pain control adequate    []            Unable to obtain ROS due to:       []            mental status change []            sedated []            intubated     Social History     Tobacco Use    Smoking status: Never Smoker    Smokeless tobacco: Never Used   Substance Use Topics    Alcohol use: No     Medications reviewed:  Current Facility-Administered Medications   Medication Dose Route Frequency    citalopram (CELEXA) tablet 10 mg  10 mg Oral QPM    folic acid (FOLVITE) tablet 1 mg  1 mg Oral QHS    HYDROcodone-acetaminophen (NORCO)  mg tablet 1 Tab  1 Tab Oral Q6H PRN    hydroxyurea (HYDREA) chemo cap 500 mg  500 mg Oral BID    losartan (COZAAR) tablet 50 mg  50 mg Oral DAILY    sodium chloride (NS) flush 5-40 mL  5-40 mL IntraVENous Q8H    sodium chloride (NS) flush 5-40 mL  5-40 mL IntraVENous PRN    acetaminophen (TYLENOL) tablet 650 mg  650 mg Oral Q4H PRN    lactated Ringers infusion  75 mL/hr IntraVENous CONTINUOUS    ondansetron (ZOFRAN) injection 4 mg  4 mg IntraVENous Q4H PRN    bisacodyl (DULCOLAX) tablet 5 mg  5 mg Oral DAILY PRN    heparin (porcine) injection 5,000 Units  5,000 Units SubCUTAneous Q8H    pantoprazole (PROTONIX) 40 mg in sodium chloride 0.9% 10 mL injection  40 mg IntraVENous DAILY    hydrALAZINE (APRESOLINE) 20 mg/mL injection 10 mg  10 mg IntraVENous Q6H PRN    HYDROmorphone (PF) (DILAUDID) injection 1 mg  1 mg IntraVENous Q3H PRN    diphenhydrAMINE (BENADRYL) injection 12.5 mg  12.5 mg IntraVENous Q3H PRN    prochlorperazine (COMPAZINE) with saline injection 2.5 mg  2.5 mg IntraVENous Q6H PRN    fentaNYL (DURAGESIC) 75 mcg/hr patch 1 Patch  1 Patch TransDERmal Q72H        Objective:   Vitals:  Visit Vitals  /61 (BP 1 Location: Right arm)   Pulse 86   Temp 99.7 °F (37.6 °C)   Resp 15   Ht 5' 11\" (1.803 m)   Wt 214 lb 9.6 oz (97.3 kg)   SpO2 95%   Breastfeeding? No   BMI 29.93 kg/m²     Temp (24hrs), Av.2 °F (37.3 °C), Min:98.8 °F (37.1 °C), Max:99.7 °F (37.6 °C)      O2 Device: Room air    Last 24hr Input/Output:    Intake/Output Summary (Last 24 hours) at 10/31/2019 0750  Last data filed at 10/30/2019 0856  Gross per 24 hour   Intake 360 ml   Output    Net 360 ml        PHYSICAL EXAM:  General:    Alert, cooperative, no distress, appears stated age.      Head:   Normocephalic, without obvious abnormality, atraumatic. Eyes:   Conjunctivae/corneas clear. PERRLA  Nose:  Nares normal. No drainage or sinus tenderness. Throat:    Lips, mucosa, and tongue normal.  No Thrush  Neck:  Supple, symmetrical,  no adenopathy, thyroid: non tender    no carotid bruit and no JVD. Back:    Symmetric,  No CVA tenderness. Lungs:   Clear to auscultation bilaterally. No Wheezing or Rhonchi. No rales. Chest wall:  No tenderness or deformity. No Accessory muscle use. Heart:   Regular rate and rhythm,  no murmur, rub or gallop. Abdomen:   Soft, non-tender. Not distended. Bowel sounds normal. No masses         Lab Data Reviewed:    Recent Labs     10/30/19  0301 10/29/19  0151   WBC 8.8 10.7   HGB 7.6* 7.6*   HCT 22.6* 22.1*    234     Recent Labs     10/29/19  0139      K 3.2*   *   CO2 28   GLU 88   BUN 7   CREA 0.71   CA 8.1*   ALB 3.3*   TBILI 1.6*   SGOT 42*   ALT 23     Lab Results   Component Value Date/Time    Glucose (POC) 138 (H) 10/06/2019 09:04 PM    Glucose (POC) 110 (H) 05/30/2019 08:04 AM    Glucose (POC) 116 (H) 02/12/2019 04:43 PM    Glucose (POC) 126 (H) 02/12/2019 11:44 AM    Glucose (POC) 109 (H) 02/12/2019 07:37 AM     No results for input(s): PH, PCO2, PO2, HCO3, FIO2 in the last 72 hours. No results for input(s): INR, INREXT, INREXT in the last 72 hours.   ___________________________________________________  ___________________________________________________    Attending Physician: Kumar Chavez MD

## 2019-10-31 NOTE — DISCHARGE INSTRUCTIONS
Patient Discharge Instructions    Nito Blevins / [id number] : 1960    Admitted 10/26/2019 Discharged: 10/31/2019     Patient Active Problem List   Diagnosis Code    Hypertension I10    Venous insufficiency I87.2    Depression F32.9    Sickle cell crisis ([facility] D57.00    Pulsatile tinnitus H93. A9    Carpal tunnel syndrome of right wrist G56.01    Sickle cell pain crisis ([facility] D57.00    Overweight (BMI 25.0-29. 9) E66.3    Sickle cell disease (HCC) D57.1    Class 1 obesity due to excess calories with serious comorbidity and body mass index (BMI) of 30.0 to 30.9 in adult E66.09, Z68.30    Acute gastroenteritis K52.9       Take Home Medications        · It is important that you take the medication exactly as they are prescribed. · Keep your medication in the bottles provided by the pharmacist and keep a list of the medication names, dosages, and times to be taken in your wallet. · Do not take other medications without consulting your doctor. What to do at Home    Recommended diet: Regular Diet,   Recommended activity: Activity as tolerated,     Follow-up with KAREL SOUTH MD in 3 day        Information obtained by :  I understand that if any problems occur once I am at home I am to contact my physician. I understand and acknowledge receipt of the instructions indicated above.                                                                                                                                            Physician's or R.N.'s Signature                                                                  Date/Time                                                                                                                                              Patient or Representative Signature                                                          Date/Time

## 2019-11-01 ENCOUNTER — PATIENT OUTREACH (OUTPATIENT)
Dept: CASE MANAGEMENT | Age: 59
End: 2019-11-01

## 2019-11-01 NOTE — PROGRESS NOTES
Nurse navigator note - cardiology    Ms. Lucio Martin is a recent discharge from [facility] - 10/3-10/17 for pain related to sickle cell disease -  10/3 - admission note - Dr. Garima Vega - Ms. Lucio Martin saw Dr. Ld Harding on 10/21 for follow up - but unfortunately readmitted on 10/26 for GI symptoms. The patient is a 79-year-old lady who has history of SF hemoglobinopathy, who presented to emergency room, because of increasing pain in her back, shoulders, and extremities. This was unresponsive to her p.o. analgesics. She was subsequently admitted for painful sickle crisis. NN attempted contact - was not able to reach pt -     She has readmission - post last admission - unable to reach pt that time either - LM for alternate contact - requesting follow up call. 10/31/19 - discharge - post presentation to ER after developign nausea, vomiting, diarrhea - starting 10/26 -     Consultation: Dr. Alexander Fine - hematology/ oncology - with discussion with pt about management with hydroxyurea. - New recommendations from [facility] - for transfusion based on symptoms -     - risk of iron overload with repeated transfusions - or alloimmunity - administer blood if symptoms as long as HGB above 5 -     Consultation - GI - Dr. Grover Alberto - suspect gastroenteritis, improved. Tx - IV fluids, narcotic analgesics, diet advancement -     Final diagnoses:  Acute gastroenteritis  Sickle cell crisis  Overweight  Depression  Venous insufficiency  Primary hypertension. No med changes - follow up 3-5 days - Dr. Ld Harding.      Laury Hein RN , [facility], [facility]  Care transitions nurse/ [facility]  [phone]

## 2019-11-01 NOTE — LETTER
11/1/2019 4:02 PM 
 
Ms. Parul Levy [facility] [id number] Ms. Prachi Zavala,  
 
I hope you are feeling better. I am a , and have tried to check on you after your hospital visits - but your voice mail has been full. I just like to follow up after hospital discharge to check on your symptoms. I am enclosing some information about [facility], as well for you - they can help with home assessments - for non emergent health conditions - You have a follow up with Dr. Brinda Ibarra - on :  
11/5/2019 1:15 PM Christian Miguel MD [address] [place] [address] and [address] [place] [address]  
11/26/2019 2:45 PM Christian Miguel MD [address] [place] [address] and [address] [place] [address] For hospital follow up. Please call me if we have not talked  - to review your discharge ; or if I can help you with care needs or resources. Wade Candelaria RN , CHFN, CCM Care transitions nurse/ [address] [place] [address] [phone]

## 2019-11-05 ENCOUNTER — OFFICE VISIT (OUTPATIENT)
Dept: INTERNAL MEDICINE CLINIC | Age: 59
End: 2019-11-05

## 2019-11-05 VITALS
SYSTOLIC BLOOD PRESSURE: 144 MMHG | OXYGEN SATURATION: 98 % | BODY MASS INDEX: 30.36 KG/M2 | DIASTOLIC BLOOD PRESSURE: 90 MMHG | RESPIRATION RATE: 16 BRPM | WEIGHT: 216.9 LBS | HEIGHT: 71 IN | TEMPERATURE: 99.4 F | HEART RATE: 93 BPM

## 2019-11-05 DIAGNOSIS — F32.A DEPRESSION, UNSPECIFIED DEPRESSION TYPE: ICD-10-CM

## 2019-11-05 DIAGNOSIS — I10 ESSENTIAL HYPERTENSION: ICD-10-CM

## 2019-11-05 DIAGNOSIS — K52.9 GASTROENTERITIS: Primary | ICD-10-CM

## 2019-11-05 DIAGNOSIS — D57.00 SICKLE CELL PAIN CRISIS (HCC): ICD-10-CM

## 2019-11-05 DIAGNOSIS — E66.09 CLASS 1 OBESITY DUE TO EXCESS CALORIES WITH SERIOUS COMORBIDITY AND BODY MASS INDEX (BMI) OF 30.0 TO 30.9 IN ADULT: ICD-10-CM

## 2019-11-05 NOTE — PROGRESS NOTES
Chief Complaint   Patient presents with   [address]     Patient seen at [address] [place] [address] on 10/26/19 for Sickle Cell Crisis. 1. Have you been to the ER, urgent care clinic since your last visit? Hospitalized since your last visit? Yes When: 10/26/19 Where: [address] [place] [address]  Reason for visit: Sickle Cell Crisis    2. Have you seen or consulted any other health care providers outside of the [address] [place] [address] since your last visit? Include any pap smears or colon screening.  No

## 2019-11-05 NOTE — PROGRESS NOTES
[address] [facility]  [place] [address]  [address] [place] [address]  Phone:  [phone]  Fax: [phone]       Chief Complaint   Patient presents with   Anuradha [address]     Patient seen at [address] [place] [address] on 10/26/19 for Sickle Cell Crisis. .      SUBJECTIVE:    Melida Mandujano is a 64 y.o. female Comes in for return visit having been hospitalized most recently for a gastroenteritis. Fortunately this resolved uneventfully. This precipitated a sickle cell crisis, which was mild. Since being home she has done reasonably well. She remains on her Hydroxyurea. She has a past history of primary hypertension, depression and obesity. Current Outpatient Medications   Medication Sig Dispense Refill    HYDROcodone-acetaminophen (NORCO)  mg tablet Take 1 Tab by mouth every six (6) hours as needed for Pain for up to 30 days. Max Daily Amount: 4 Tabs. Dx.D57.1  Indications: sickl;e cell crisis 120 Tab 0    hydroxyurea (HYDREA) 500 mg capsule 1 tablet two times a day 60 Cap 11    hydrocortisone (CORTISONE) 1 % topical cream Apply  to affected area two (2) times daily as needed for Skin Irritation. use thin layer 30 g 11    telmisartan (MICARDIS) 40 mg tablet Take 1 Tab by mouth nightly. 30 Tab 11    folic acid (FOLVITE) 1 mg tablet Take 1 mg by mouth nightly.  omeprazole (PRILOSEC OTC) 20 mg tablet Take 20 mg by mouth nightly.  promethazine (PHENERGAN) 25 mg tablet Take 1 Tab by mouth every eight (8) hours as needed for Nausea.  90 Tab 11    citalopram (CELEXA) 10 mg tablet TAKE 1 TABLET BY MOUTH EVERY NIGHT AT BEDTIME 90 Tab 3     Past Medical History:   Diagnosis Date    Anemia     SC hemoglobinopathy    Chronic pain     Depression     Hypertension     Liver disease     hepatitis C; pt reports \"cured\"    Sickle cell disease ([facility] [address]      Past Surgical History:   Procedure Laterality Date    BREAST SURGERY PROCEDURE UNLISTED      2 cysts removed from R breast  CHEST SURGERY PROCEDURE UNLISTED      status post thor for removal of a benign     HX BREAST BIOPSY Right 05/2007    negative    HX CHOLECYSTECTOMY      HX ORTHOPAEDIC      bony curettage of an ostial myelitis focus     Allergies   Allergen Reactions    Dilaudid [Hydromorphone (Bulk)] Itching     Can tolerate with benadryl and ondansetron    Percocet [Oxycodone-Acetaminophen] Itching         REVIEW OF SYSTEMS:  General: negative for - chills or fever  ENT: negative for - headaches, nasal congestion or tinnitus  Respiratory: negative for - cough, hemoptysis, shortness of breath or wheezing  Cardiovascular : negative for - chest pain, edema, palpitations or shortness of breath  Gastrointestinal: negative for - abdominal pain, blood in stools, heartburn or nausea/vomiting  Genito-Urinary: no dysuria, trouble voiding, or hematuria  Musculoskeletal: negative for - gait disturbance, joint pain, joint stiffness or joint swelling  Neurological: no TIA or stroke symptoms  Hematologic: no bruises, no bleeding, no swollen glands  Integument: no lumps, mole changes, nail changes or rash  Endocrine: no malaise/lethargy or unexpected weight changes      Social History     Socioeconomic History    Marital status:      Spouse name: Not on file    Number of children: 2    Years of education: Not on file    Highest education level: Not on file   Occupational History    Occupation: disabled---Sickle cell disease   Tobacco Use    Smoking status: Never Smoker    Smokeless tobacco: Never Used   Substance and Sexual Activity    Alcohol use: No    Drug use: No    Sexual activity: Yes     Partners: Male     Family History   Problem Relation Age of Onset    Hypertension Mother     Hypertension Father        OBJECTIVE:    Visit Vitals  /90   Pulse 93   Temp 99.4 °F (37.4 °C) (Oral)   Resp 16   Ht 5' 11\" (1.803 m)   Wt 216 lb 14.4 oz (98.4 kg)   SpO2 98%   BMI 30.25 kg/m²     CONSTITUTIONAL: well , well nourished, appears age appropriate  EYES: perrla, eom intact  ENMT:moist mucous membranes, pharynx clear  NECK: supple. Thyroid normal  RESPIRATORY: Chest: clear to ascultation and percussion   CARDIOVASCULAR: Heart: regular rate and rhythm  GASTROINTESTINAL: Abdomen: soft, bowel sounds active  HEMATOLOGIC: no pathological lymph nodes palpated  MUSCULOSKELETAL: Extremities: no edema, pulse 1+   INTEGUMENT: No unusual rashes or suspicious skin lesions noted. Nails appear normal.  NEUROLOGIC: non-focal exam   MENTAL STATUS: alert and oriented, appropriate affect      ASSESSMENT:  1. Gastroenteritis    2. Sickle cell pain crisis ([facility] [address]    3. Essential hypertension    4. Depression, unspecified depression type    5. Class 1 obesity due to excess calories with serious comorbidity and body mass index (BMI) of 30.0 to 30.9 in adult        PLAN:    1. The patient has a resolving viral gastroenteritis. Nothing more need be done. Fortunately she is asymptomatic from a GI perspective currently. 2. Her sickle cell crisis has for the most part resolved other than her episodic chronic pain. She is reasonably stable. She will continue her Hydroxyurea at 500 mg b.i.d. She has had no adverse effects to date. 3. Blood pressure is excellent today, no adjustments are made. 4. Her depression is quite stable. She remains on her Celexa. 5. I again encouraged her to lose weight. This can be accomplished by eating meals, eliminating snacks and avoiding the consumption of processed carbs. Follow-up and Dispositions    · Return keep old apt.            Mayuri Aguirre MD

## 2019-11-14 DIAGNOSIS — D57.1 HB-SS DISEASE WITHOUT CRISIS (HCC): ICD-10-CM

## 2019-11-16 RX ORDER — HYDROCODONE BITARTRATE AND ACETAMINOPHEN 10; 325 MG/1; MG/1
1 TABLET ORAL
Qty: 120 TAB | Refills: 0 | Status: SHIPPED | OUTPATIENT
Start: 2019-11-16 | End: 2019-12-16

## 2019-12-02 ENCOUNTER — PATIENT OUTREACH (OUTPATIENT)
Dept: INTERNAL MEDICINE CLINIC | Age: 59
End: 2019-12-02

## 2019-12-05 ENCOUNTER — PATIENT OUTREACH (OUTPATIENT)
Dept: CASE MANAGEMENT | Age: 59
End: 2019-12-05

## 2019-12-05 NOTE — PROGRESS NOTES
12/5/19  Pt has transitions to Complex case management with Christine Cleveland RN - in conjunction with Dr. Patricia Goode -   CTN removed from care team - and KRISTINA episode closed.   ttk

## 2019-12-12 ENCOUNTER — OFFICE VISIT (OUTPATIENT)
Dept: INTERNAL MEDICINE CLINIC | Age: 59
End: 2019-12-12

## 2019-12-12 ENCOUNTER — PATIENT OUTREACH (OUTPATIENT)
Dept: INTERNAL MEDICINE CLINIC | Age: 59
End: 2019-12-12

## 2019-12-12 VITALS
OXYGEN SATURATION: 94 % | RESPIRATION RATE: 16 BRPM | HEART RATE: 92 BPM | SYSTOLIC BLOOD PRESSURE: 139 MMHG | TEMPERATURE: 99.2 F | DIASTOLIC BLOOD PRESSURE: 82 MMHG | HEIGHT: 71 IN | BODY MASS INDEX: 29.57 KG/M2 | WEIGHT: 211.2 LBS

## 2019-12-12 DIAGNOSIS — D57.1 HB-SS DISEASE WITHOUT CRISIS (HCC): Primary | ICD-10-CM

## 2019-12-12 DIAGNOSIS — I10 ESSENTIAL HYPERTENSION: ICD-10-CM

## 2019-12-12 DIAGNOSIS — E66.09 CLASS 1 OBESITY DUE TO EXCESS CALORIES WITH SERIOUS COMORBIDITY AND BODY MASS INDEX (BMI) OF 30.0 TO 30.9 IN ADULT: ICD-10-CM

## 2019-12-12 DIAGNOSIS — F32.A DEPRESSION, UNSPECIFIED DEPRESSION TYPE: ICD-10-CM

## 2019-12-12 NOTE — PROGRESS NOTES
[address] [facility]  [place] [address]  [address] [facility] [address]  Phone:  [phone]  Fax: [phone]       Chief Complaint   Patient presents with    Sickle Cell Disease     2 month follow up    . SUBJECTIVE:    Charissa Cunningham is a 64 y.o. female Comes in for return visit stating that she has done well. She is having minimal pain at this point. She is tolerating her Hydroxyurea 500 mg b.i.d. quite well with no adverse effects. She states she has lost weight, about 9 lbs. She has a past history of primary hypertension and depression. She is not physically active and I encouraged her to improve this. Current Outpatient Medications   Medication Sig Dispense Refill    HYDROcodone-acetaminophen (NORCO)  mg tablet Take 1 Tab by mouth every six (6) hours as needed for Pain for up to 30 days. Max Daily Amount: 4 Tabs. Dx.D57.1  Indications: sickl;e cell crisis 120 Tab 0    hydroxyurea (HYDREA) 500 mg capsule 1 tablet two times a day 60 Cap 11    hydrocortisone (CORTISONE) 1 % topical cream Apply  to affected area two (2) times daily as needed for Skin Irritation. use thin layer 30 g 11    telmisartan (MICARDIS) 40 mg tablet Take 1 Tab by mouth nightly. 30 Tab 11    folic acid (FOLVITE) 1 mg tablet Take 1 mg by mouth nightly.  omeprazole (PRILOSEC OTC) 20 mg tablet Take 20 mg by mouth nightly.  promethazine (PHENERGAN) 25 mg tablet Take 1 Tab by mouth every eight (8) hours as needed for Nausea.  90 Tab 11    citalopram (CELEXA) 10 mg tablet TAKE 1 TABLET BY MOUTH EVERY NIGHT AT BEDTIME 90 Tab 3     Past Medical History:   Diagnosis Date    Anemia     SC hemoglobinopathy    Chronic pain     Depression     Hypertension     Liver disease     hepatitis C; pt reports \"cured\"    Sickle cell disease ([facility] [address]      Past Surgical History:   Procedure Laterality Date    BREAST SURGERY PROCEDURE UNLISTED      2 cysts removed from R breast    CHEST SURGERY PROCEDURE UNLISTED      status post thor for removal of a benign     HX BREAST BIOPSY Right 05/2007    negative    HX CHOLECYSTECTOMY      HX ORTHOPAEDIC      bony curettage of an ostial myelitis focus     Allergies   Allergen Reactions    Dilaudid [Hydromorphone (Bulk)] Itching     Can tolerate with benadryl and ondansetron    Percocet [Oxycodone-Acetaminophen] Itching         REVIEW OF SYSTEMS:  General: negative for - chills or fever  ENT: negative for - headaches, nasal congestion or tinnitus  Respiratory: negative for - cough, hemoptysis, shortness of breath or wheezing  Cardiovascular : negative for - chest pain, edema, palpitations or shortness of breath  Gastrointestinal: negative for - abdominal pain, blood in stools, heartburn or nausea/vomiting  Genito-Urinary: no dysuria, trouble voiding, or hematuria  Musculoskeletal: negative for - gait disturbance, joint pain, joint stiffness or joint swelling  Neurological: no TIA or stroke symptoms  Hematologic: no bruises, no bleeding, no swollen glands  Integument: no lumps, mole changes, nail changes or rash  Endocrine: no malaise/lethargy or unexpected weight changes      Social History     Socioeconomic History    Marital status:      Spouse name: Not on file    Number of children: 2    Years of education: Not on file    Highest education level: Not on file   Occupational History    Occupation: disabled---Sickle cell disease   Tobacco Use    Smoking status: Never Smoker    Smokeless tobacco: Never Used   Substance and Sexual Activity    Alcohol use: No    Drug use: No    Sexual activity: Yes     Partners: Male     Family History   Problem Relation Age of Onset    Hypertension Mother     Hypertension Father        OBJECTIVE:    Visit Vitals  /82   Pulse 92   Temp 99.2 °F (37.3 °C) (Oral)   Resp 16   Ht 5' 11\" (1.803 m)   Wt 211 lb 3.2 oz (95.8 kg)   SpO2 94%   BMI 29.46 kg/m²     CONSTITUTIONAL: well , well nourished, appears age appropriate  EYES: perrla, eom intact  ENMT:moist mucous membranes, pharynx clear  NECK: supple. Thyroid normal  RESPIRATORY: Chest: clear to ascultation and percussion   CARDIOVASCULAR: Heart: regular rate and rhythm  GASTROINTESTINAL: Abdomen: soft, bowel sounds active  HEMATOLOGIC: no pathological lymph nodes palpated  MUSCULOSKELETAL: Extremities: no edema, pulse 1+   INTEGUMENT: No unusual rashes or suspicious skin lesions noted. Nails appear normal.  NEUROLOGIC: non-focal exam   MENTAL STATUS: alert and oriented, appropriate affect      ASSESSMENT:  1. Hb-SS disease without crisis ([facility] [address]    2. Class 1 obesity due to excess calories with serious comorbidity and body mass index (BMI) of 30.0 to 30.9 in adult    3. Essential hypertension    4. Depression, unspecified depression type        PLAN:    1. I will assess her _____________ hemoglobin in view of the hydroxyurea. She will continue analgesics as prescribed. 2. I encouraged weight reduction. This can be accomplished by eating meals, eliminating snacks and avoiding the consumption of processed carbohydrates. 3. BP is excellent today, no adjustments are made. 4. Her depression is stable. She is quite upbeat now and I am very pleased with this. She will, however, continue her antidepressant. .  Orders Placed This Encounter    HEMOGLOBIN FRACTIONATION    CBC WITH AUTOMATED DIFF         Follow-up and Dispositions    · Return in about 2 months (around 2/12/2020).            Perry Pisano MD

## 2019-12-12 NOTE — PROGRESS NOTES
Chief Complaint   Patient presents with    Sickle Cell Disease     2 month follow up      1. Have you been to the ER, urgent care clinic since your last visit? Hospitalized since your last visit? No    2. Have you seen or consulted any other health care providers outside of the [address] [place] [address] since your last visit? Include any pap smears or colon screening.  No

## 2019-12-13 LAB — SPECIMEN STATUS REPORT, ROLRST: NORMAL

## 2019-12-18 DIAGNOSIS — D57.00 SICKLE CELL CRISIS (HCC): Primary | ICD-10-CM

## 2019-12-18 LAB
BASOPHILS # BLD AUTO: 0 X10E3/UL (ref 0–0.2)
BASOPHILS NFR BLD AUTO: 1 %
EOSINOPHIL # BLD AUTO: 0.1 X10E3/UL (ref 0–0.4)
EOSINOPHIL NFR BLD AUTO: 2 %
ERYTHROCYTE [DISTWIDTH] IN BLOOD BY AUTOMATED COUNT: 18 % (ref 12.3–15.4)
HCT VFR BLD AUTO: 26.6 % (ref 34–46.6)
HGB A MFR BLD: 10.1 % (ref 96.4–98.8)
HGB A2 MFR BLD COLUMN CHROM: 4 % (ref 1.8–3.2)
HGB BLD-MCNC: 9 G/DL (ref 11.1–15.9)
HGB C MFR BLD: 0 %
HGB F MFR BLD: 18.3 % (ref 0–2)
HGB FRACT BLD-IMP: ABNORMAL
HGB OTHER MFR BLD HPLC: 0 %
HGB S BLD QL SOLY: POSITIVE
HGB S MFR BLD: 67.6 %
IMM GRANULOCYTES # BLD AUTO: 0 X10E3/UL (ref 0–0.1)
IMM GRANULOCYTES NFR BLD AUTO: 0 %
LYMPHOCYTES # BLD AUTO: 3.6 X10E3/UL (ref 0.7–3.1)
LYMPHOCYTES NFR BLD AUTO: 59 %
MCH RBC QN AUTO: 37.7 PG (ref 26.6–33)
MCHC RBC AUTO-ENTMCNC: 33.8 G/DL (ref 31.5–35.7)
MCV RBC AUTO: 111 FL (ref 79–97)
MONOCYTES # BLD AUTO: 0.7 X10E3/UL (ref 0.1–0.9)
MONOCYTES NFR BLD AUTO: 11 %
NEUTROPHILS # BLD AUTO: 1.6 X10E3/UL (ref 1.4–7)
NEUTROPHILS NFR BLD AUTO: 27 %
NRBC BLD AUTO-RTO: 2 % (ref 0–0)
PLATELET # BLD AUTO: 233 X10E3/UL (ref 150–450)
RBC # BLD AUTO: 2.39 X10E6/UL (ref 3.77–5.28)
WBC # BLD AUTO: 6.1 X10E3/UL (ref 3.4–10.8)

## 2019-12-18 RX ORDER — FENTANYL 75 UG/H
PATCH TRANSDERMAL
Refills: 0 | COMMUNITY
Start: 2019-11-10 | End: 2019-12-18 | Stop reason: SDUPTHER

## 2019-12-19 RX ORDER — FENTANYL 75 UG/H
1 PATCH TRANSDERMAL
Qty: 10 PATCH | Refills: 0 | Status: SHIPPED | OUTPATIENT
Start: 2019-12-19 | End: 2020-01-18

## 2019-12-24 NOTE — PROGRESS NOTES
Goals Addressed                 This Visit's Progress     Patient/Family verbalizes understanding of self-management of chronic disease. 5/9/2019: NNTOCIP [facility] 4/14-5/2/2019:  Sickle Cell Crisis f/u -see coordinate Pain Management Plan goal.  EW     6/7/2019:  Continued teachings done with rationale:    Drink plenty of fluids- dehydrated can increase your risk of a sickle crisis. Recommendation is about 8 glasses a day and drink more fluid if youre exercising or in hot weather. Sleep--encouraged to get enough sleep. Eat right-plenty of fruits, vegetables, whole grains, and protein--discussed the type meat/proteins she enjoyed; [place] [employer] Encouraged exercise in moderation for her; to aim to increase simply walking more around the house/any mod exercise a week;  Encouraged patient to PCP before starting a new exercise routine--Patient stated PCP has been on her at every visit to increase exercise/activity on a daily basis--discussed physical activity is key in staying healthy. NN warned against trying to  overdo it. Rest when you get tired. Take medicine as ordered. Make sure  take your prescription medicine as directed. Get medical and lab tests that your doctor recommends. Discussion on getting the annual flu shot, and pneumococcal and meningococcal vaccines as recommended by PCP in that common illnesses, like the flu, can quickly become dangerous. Discussion on Extreme temperatures: extreme heat or cold, or any craig changes in temperatures, could set off a crisis. Reminded patient of High altitude--leading to Lack of oxygen at high altitudes could trigger a crisis. (Reminded that Planes, because theyre pressurized, shouldnt be a problem). Alcohol. It can make you dehydrated--patient denied consumptions of;    Smoking. This can trigger a lung condition called acute chest syndrome.  This is when sickle cells stick together and block oxygen from getting into your lungs (even warned patient re walking in and out of stores that smokers congregate smoking). Infections. Common illnesses can be very serious for people with SCD. Wash your hands before eating or after using the bathroom. Wash your fruits and veggies, and avoid raw meat, eggs, and unpasteurized milk. Stress-  Though sometimes hard to avoid, but stress or depression can trigger a crisis (states she has not been depressed as much as usual)--advised to try to take time to relax or find techniques that help you calm down and such to prevent and/or relieve depression episodes. Patient agreed to try. EW      6/14/2019:  N[facility]IP [facility] 5/26-6/6/2019:  Sickle Cell Crisis f/u  Patient in for f/u office visit today. 1.  Continued discussion of not being able tto obtain Fentanyl Patch & Hydrocodone simultaneously taken. Discussed the opiod crisis and what were her thoughts on the same. 2.  Supported and discussed PCP concerns for weight gain:   Eating same time each day; avoiding snacks; avoiding consumption of processed carbohydrates. Understanding assured with reiteration of advisement. EW     6/19/2019;  Patient Teach-back done:    Drinking plenty of fluids-verbalized understanding of prevention of increased risk of a sickle crisis. States not 8 glasses yet but more than 5.  .  Sleep--states sleeping better but with medications. .  Eat right- agreed to increased buying of fruits & vegetables, whole grains, and sufficient meats without a lot of fat as protein. .States she is walking more as exercise.  -discussed physical activity is key in staying healthy. States she is getting more rest.  EW     6/25/2019:  Patient at office contact for PCP medication re-evaluation. Patient Review:  Proper diet & weight reduction as reviewed by PCP; The 3 advisements from PCP review: 1. eating meals 2.  eliminating snacks,  3. avoiding the consumption of processed carbs. Patient agrees to work on depression-continue anti-depressants as ordered.   Advisement: Eating the right foods at meals; avoiding sugary foods--advised that healthy eating affects mental and physical status; drinking sufficient water prevents dehydration. EW     8/12/2019:  Pain Coordination discussion w/ PCP. EW     9/4/2019:  Call received from patient stating she is run out of pain medication; states pharmacy states no refill on this type medication. Patient requesting refill. C/o painful joints and needs pain medication as soon as possible. Advised PCP at [facility] office and medication request for this  must go through Medication Specialist Peyman Iqbal; that prescriptions for controlled substance as this are not able to be simply called in, thus NN unable to do so; that Peyman Iqbal will reach him with patient concerns hourly if need. Agrees to contact Peyman Iqbal. Dressing/Staying warm/drinking warm liquids discussed. Consult done w/ PCP-agreed to look at request/need later this evening. EW.      9/23/2019:  No complaints re hydroxyurea. States diet high on the priority list.   EW     12/2/2019:  Resume ACM Complex CM s/p 10/31 discharge for Sickle Cell Pain Crisis. States since being on hydroxyurea has changed her body awareness. States the continued pain is so much less. States she was away on vacation but rested when needed and knew her limits. Reports limiting sugary and salty foods as much as possible and drinking more water. Reports intermittent fatigue still; states got off road from trip last evening and is requesting to change office visit from tomorrow to 12/12/2019. Will continue to f/u.  EW     12/12/2019:  ACM Complex CM:  Sickle Cell Pain f/u:  C/o minimal pain. Reports she is tolerating theHydroxyurea 500 mg b.i.d.  well with no adverse effects. Patient has lost weight, approx 9 lbs. Discussion on hypertension & things to do for prevention of depression continues. EW.       .                  1/7/2020

## 2019-12-30 ENCOUNTER — PATIENT OUTREACH (OUTPATIENT)
Dept: INTERNAL MEDICINE CLINIC | Age: 59
End: 2019-12-30

## 2019-12-30 NOTE — PROGRESS NOTES
ACM Complex CM:  Sickle Cell Crisis Pain f/u  Patient on Complex CM f/u Contact. Unable to Leave  message on voice mail with my contact information for return call-recording states full. Need to assess for ongoing needs and CCM issues; patient transferred back to CCM from CTN  .

## 2020-01-02 DIAGNOSIS — D57.00 SICKLE CELL CRISIS (HCC): Primary | ICD-10-CM

## 2020-01-03 RX ORDER — HYDROCODONE BITARTRATE AND ACETAMINOPHEN 10; 325 MG/1; MG/1
1 TABLET ORAL
Qty: 120 TAB | Refills: 0 | Status: SHIPPED | OUTPATIENT
Start: 2020-01-03 | End: 2020-02-21 | Stop reason: SDUPTHER

## 2020-01-19 ENCOUNTER — HOSPITAL ENCOUNTER (EMERGENCY)
Age: 60
Discharge: HOME | End: 2020-01-20
Attending: EMERGENCY MEDICINE
Payer: MEDICARE

## 2020-01-19 DIAGNOSIS — D57.00 SICKLE CELL ANEMIA WITH PAIN (HCC): ICD-10-CM

## 2020-01-19 DIAGNOSIS — R11.2 NON-INTRACTABLE VOMITING WITH NAUSEA, UNSPECIFIED VOMITING TYPE: Primary | ICD-10-CM

## 2020-01-19 LAB
ALBUMIN SERPL-MCNC: 3.9 G/DL (ref 3.5–5)
ALBUMIN/GLOB SERPL: 0.8 {RATIO} (ref 1.1–2.2)
ALP SERPL-CCNC: 135 U/L (ref 45–117)
ALT SERPL-CCNC: 17 U/L (ref 12–78)
ANION GAP SERPL CALC-SCNC: 11 MMOL/L (ref 5–15)
AST SERPL-CCNC: 32 U/L (ref 15–37)
BASOPHILS # BLD: 0 K/UL (ref 0–0.1)
BASOPHILS NFR BLD: 0 % (ref 0–1)
BILIRUB SERPL-MCNC: 1.8 MG/DL (ref 0.2–1)
BUN SERPL-MCNC: 13 MG/DL (ref 6–20)
BUN/CREAT SERPL: 15 (ref 12–20)
CALCIUM SERPL-MCNC: 9.6 MG/DL (ref 8.5–10.1)
CHLORIDE SERPL-SCNC: 110 MMOL/L (ref 97–108)
CO2 SERPL-SCNC: 18 MMOL/L (ref 21–32)
COMMENT, HOLDF: NORMAL
CREAT SERPL-MCNC: 0.84 MG/DL (ref 0.55–1.02)
DIFFERENTIAL METHOD BLD: ABNORMAL
EOSINOPHIL # BLD: 0 K/UL (ref 0–0.4)
EOSINOPHIL NFR BLD: 0 % (ref 0–7)
ERYTHROCYTE [DISTWIDTH] IN BLOOD BY AUTOMATED COUNT: 14.6 % (ref 11.5–14.5)
GLOBULIN SER CALC-MCNC: 5.2 G/DL (ref 2–4)
GLUCOSE SERPL-MCNC: 158 MG/DL (ref 65–100)
HCT VFR BLD AUTO: 26.8 % (ref 35–47)
HGB BLD-MCNC: 9.6 G/DL (ref 11.5–16)
IMM GRANULOCYTES # BLD AUTO: 0.1 K/UL (ref 0–0.04)
IMM GRANULOCYTES NFR BLD AUTO: 1 % (ref 0–0.5)
LYMPHOCYTES # BLD: 0.6 K/UL (ref 0.8–3.5)
LYMPHOCYTES NFR BLD: 5 % (ref 12–49)
MCH RBC QN AUTO: 38.2 PG (ref 26–34)
MCHC RBC AUTO-ENTMCNC: 35.8 G/DL (ref 30–36.5)
MCV RBC AUTO: 106.8 FL (ref 80–99)
MONOCYTES # BLD: 0.7 K/UL (ref 0–1)
MONOCYTES NFR BLD: 6 % (ref 5–13)
NEUTS SEG # BLD: 9.9 K/UL (ref 1.8–8)
NEUTS SEG NFR BLD: 88 % (ref 32–75)
NRBC # BLD: 0.46 K/UL (ref 0–0.01)
NRBC BLD-RTO: 4.1 PER 100 WBC
PLATELET # BLD AUTO: 233 K/UL (ref 150–400)
PMV BLD AUTO: 11.3 FL (ref 8.9–12.9)
POTASSIUM SERPL-SCNC: 2.9 MMOL/L (ref 3.5–5.1)
PROT SERPL-MCNC: 9.1 G/DL (ref 6.4–8.2)
RBC # BLD AUTO: 2.51 M/UL (ref 3.8–5.2)
RBC MORPH BLD: ABNORMAL
RETICS # AUTO: 0.08 M/UL (ref 0.02–0.08)
RETICS/RBC NFR AUTO: 3 % (ref 0.7–2.1)
SAMPLES BEING HELD,HOLD: NORMAL
SODIUM SERPL-SCNC: 139 MMOL/L (ref 136–145)
WBC # BLD AUTO: 11.3 K/UL (ref 3.6–11)

## 2020-01-19 PROCEDURE — 80053 COMPREHEN METABOLIC PANEL: CPT

## 2020-01-19 PROCEDURE — 85025 COMPLETE CBC W/AUTO DIFF WBC: CPT

## 2020-01-19 PROCEDURE — 96372 THER/PROPH/DIAG INJ SC/IM: CPT

## 2020-01-19 PROCEDURE — 36415 COLL VENOUS BLD VENIPUNCTURE: CPT

## 2020-01-19 PROCEDURE — 74011250636 HC RX REV CODE- 250/636: Performed by: EMERGENCY MEDICINE

## 2020-01-19 PROCEDURE — 85045 AUTOMATED RETICULOCYTE COUNT: CPT

## 2020-01-19 PROCEDURE — 36600 WITHDRAWAL OF ARTERIAL BLOOD: CPT

## 2020-01-19 PROCEDURE — 99284 EMERGENCY DEPT VISIT MOD MDM: CPT

## 2020-01-19 PROCEDURE — 74011250637 HC RX REV CODE- 250/637: Performed by: EMERGENCY MEDICINE

## 2020-01-19 RX ORDER — DIPHENHYDRAMINE HYDROCHLORIDE 50 MG/ML
50 INJECTION, SOLUTION INTRAMUSCULAR; INTRAVENOUS
Status: COMPLETED | OUTPATIENT
Start: 2020-01-19 | End: 2020-01-19

## 2020-01-19 RX ORDER — ONDANSETRON 4 MG/1
8 TABLET, ORALLY DISINTEGRATING ORAL
Status: COMPLETED | OUTPATIENT
Start: 2020-01-19 | End: 2020-01-19

## 2020-01-19 RX ORDER — ONDANSETRON 2 MG/ML
4 INJECTION INTRAMUSCULAR; INTRAVENOUS
Status: DISCONTINUED | OUTPATIENT
Start: 2020-01-19 | End: 2020-01-19

## 2020-01-19 RX ORDER — HYDROMORPHONE HYDROCHLORIDE 1 MG/ML
2 INJECTION, SOLUTION INTRAMUSCULAR; INTRAVENOUS; SUBCUTANEOUS ONCE
Status: COMPLETED | OUTPATIENT
Start: 2020-01-19 | End: 2020-01-19

## 2020-01-19 RX ORDER — PROMETHAZINE HYDROCHLORIDE 25 MG/1
50 SUPPOSITORY RECTAL
Status: COMPLETED | OUTPATIENT
Start: 2020-01-19 | End: 2020-01-19

## 2020-01-19 RX ORDER — DIPHENHYDRAMINE HYDROCHLORIDE 50 MG/ML
50 INJECTION, SOLUTION INTRAMUSCULAR; INTRAVENOUS
Status: DISCONTINUED | OUTPATIENT
Start: 2020-01-19 | End: 2020-01-19

## 2020-01-19 RX ADMIN — DIPHENHYDRAMINE HYDROCHLORIDE 50 MG: 50 INJECTION, SOLUTION INTRAMUSCULAR; INTRAVENOUS at 21:55

## 2020-01-19 RX ADMIN — ONDANSETRON 8 MG: 4 TABLET, ORALLY DISINTEGRATING ORAL at 22:33

## 2020-01-19 RX ADMIN — HYDROMORPHONE HYDROCHLORIDE 2 MG: 1 INJECTION, SOLUTION INTRAMUSCULAR; INTRAVENOUS; SUBCUTANEOUS at 21:56

## 2020-01-19 RX ADMIN — PROMETHAZINE HYDROCHLORIDE 50 MG: 25 SUPPOSITORY RECTAL at 21:56

## 2020-01-20 ENCOUNTER — APPOINTMENT (OUTPATIENT)
Dept: CT IMAGING | Age: 60
DRG: 391 | End: 2020-01-20
Attending: EMERGENCY MEDICINE
Payer: MEDICARE

## 2020-01-20 ENCOUNTER — HOSPITAL ENCOUNTER (INPATIENT)
Age: 60
LOS: 10 days | Discharge: HOME | DRG: 391 | End: 2020-01-30
Attending: EMERGENCY MEDICINE | Admitting: HOSPITALIST
Payer: MEDICARE

## 2020-01-20 VITALS
DIASTOLIC BLOOD PRESSURE: 72 MMHG | RESPIRATION RATE: 16 BRPM | WEIGHT: 219 LBS | BODY MASS INDEX: 30.66 KG/M2 | HEART RATE: 85 BPM | TEMPERATURE: 98.2 F | SYSTOLIC BLOOD PRESSURE: 142 MMHG | HEIGHT: 71 IN | OXYGEN SATURATION: 98 %

## 2020-01-20 DIAGNOSIS — D57.00 HB-SS DISEASE WITH CRISIS (HCC): ICD-10-CM

## 2020-01-20 DIAGNOSIS — K52.9 ACUTE GASTROENTERITIS: Primary | ICD-10-CM

## 2020-01-20 DIAGNOSIS — G89.29 OTHER CHRONIC PAIN: ICD-10-CM

## 2020-01-20 LAB
ALBUMIN SERPL-MCNC: 4.4 G/DL (ref 3.5–5)
ALBUMIN/GLOB SERPL: 0.8 {RATIO} (ref 1.1–2.2)
ALP SERPL-CCNC: 138 U/L (ref 45–117)
ALT SERPL-CCNC: 20 U/L (ref 12–78)
ANION GAP SERPL CALC-SCNC: 11 MMOL/L (ref 5–15)
AST SERPL-CCNC: 37 U/L (ref 15–37)
BASOPHILS # BLD: 0 K/UL (ref 0–0.1)
BASOPHILS NFR BLD: 0 % (ref 0–1)
BILIRUB SERPL-MCNC: 1.9 MG/DL (ref 0.2–1)
BUN SERPL-MCNC: 15 MG/DL (ref 6–20)
BUN/CREAT SERPL: 14 (ref 12–20)
CALCIUM SERPL-MCNC: 9.5 MG/DL (ref 8.5–10.1)
CHLORIDE SERPL-SCNC: 108 MMOL/L (ref 97–108)
CO2 SERPL-SCNC: 22 MMOL/L (ref 21–32)
CREAT SERPL-MCNC: 1.07 MG/DL (ref 0.55–1.02)
DIFFERENTIAL METHOD BLD: ABNORMAL
EOSINOPHIL # BLD: 0 K/UL (ref 0–0.4)
EOSINOPHIL NFR BLD: 0 % (ref 0–7)
ERYTHROCYTE [DISTWIDTH] IN BLOOD BY AUTOMATED COUNT: 15.2 % (ref 11.5–14.5)
GLOBULIN SER CALC-MCNC: 5.3 G/DL (ref 2–4)
GLUCOSE SERPL-MCNC: 152 MG/DL (ref 65–100)
HCT VFR BLD AUTO: 27.7 % (ref 35–47)
HGB BLD-MCNC: 10.3 G/DL (ref 11.5–16)
IMM GRANULOCYTES # BLD AUTO: 0.1 K/UL (ref 0–0.04)
IMM GRANULOCYTES NFR BLD AUTO: 1 % (ref 0–0.5)
INR PPP: 1.5 (ref 0.9–1.1)
LACTATE SERPL-SCNC: 1.1 MMOL/L (ref 0.4–2)
LACTATE SERPL-SCNC: 3.1 MMOL/L (ref 0.4–2)
LIPASE SERPL-CCNC: 75 U/L (ref 73–393)
LYMPHOCYTES # BLD: 0.7 K/UL (ref 0.8–3.5)
LYMPHOCYTES NFR BLD: 7 % (ref 12–49)
MCH RBC QN AUTO: 39 PG (ref 26–34)
MCHC RBC AUTO-ENTMCNC: 37.2 G/DL (ref 30–36.5)
MCV RBC AUTO: 104.9 FL (ref 80–99)
MONOCYTES # BLD: 0.5 K/UL (ref 0–1)
MONOCYTES NFR BLD: 5 % (ref 5–13)
NEUTS SEG # BLD: 8.8 K/UL (ref 1.8–8)
NEUTS SEG NFR BLD: 87 % (ref 32–75)
NRBC # BLD: 0.41 K/UL (ref 0–0.01)
NRBC BLD-RTO: 4.1 PER 100 WBC
PLATELET # BLD AUTO: 269 K/UL (ref 150–400)
PMV BLD AUTO: 11.2 FL (ref 8.9–12.9)
POTASSIUM SERPL-SCNC: 3.2 MMOL/L (ref 3.5–5.1)
PROT SERPL-MCNC: 9.7 G/DL (ref 6.4–8.2)
PROTHROMBIN TIME: 15 SEC (ref 9–11.1)
RBC # BLD AUTO: 2.64 M/UL (ref 3.8–5.2)
RBC MORPH BLD: ABNORMAL
RBC MORPH BLD: ABNORMAL
RETICS # AUTO: 0.07 M/UL (ref 0.02–0.08)
RETICS/RBC NFR AUTO: 2.5 % (ref 0.7–2.1)
SODIUM SERPL-SCNC: 141 MMOL/L (ref 136–145)
WBC # BLD AUTO: 10.1 K/UL (ref 3.6–11)

## 2020-01-20 PROCEDURE — 36415 COLL VENOUS BLD VENIPUNCTURE: CPT

## 2020-01-20 PROCEDURE — 83690 ASSAY OF LIPASE: CPT

## 2020-01-20 PROCEDURE — 74011636320 HC RX REV CODE- 636/320: Performed by: EMERGENCY MEDICINE

## 2020-01-20 PROCEDURE — 74011250636 HC RX REV CODE- 250/636: Performed by: EMERGENCY MEDICINE

## 2020-01-20 PROCEDURE — 74177 CT ABD & PELVIS W/CONTRAST: CPT

## 2020-01-20 PROCEDURE — 96372 THER/PROPH/DIAG INJ SC/IM: CPT

## 2020-01-20 PROCEDURE — 80053 COMPREHEN METABOLIC PANEL: CPT

## 2020-01-20 PROCEDURE — 83605 ASSAY OF LACTIC ACID: CPT

## 2020-01-20 PROCEDURE — 85610 PROTHROMBIN TIME: CPT

## 2020-01-20 PROCEDURE — 74011250637 HC RX REV CODE- 250/637: Performed by: HOSPITALIST

## 2020-01-20 PROCEDURE — 85045 AUTOMATED RETICULOCYTE COUNT: CPT

## 2020-01-20 PROCEDURE — 99285 EMERGENCY DEPT VISIT HI MDM: CPT

## 2020-01-20 PROCEDURE — 85025 COMPLETE CBC W/AUTO DIFF WBC: CPT

## 2020-01-20 PROCEDURE — 65270000015 HC RM PRIVATE ONCOLOGY

## 2020-01-20 PROCEDURE — 96375 TX/PRO/DX INJ NEW DRUG ADDON: CPT

## 2020-01-20 PROCEDURE — 96374 THER/PROPH/DIAG INJ IV PUSH: CPT

## 2020-01-20 PROCEDURE — 74011250636 HC RX REV CODE- 250/636: Performed by: HOSPITALIST

## 2020-01-20 RX ORDER — HYDROMORPHONE HYDROCHLORIDE 1 MG/ML
2 INJECTION, SOLUTION INTRAMUSCULAR; INTRAVENOUS; SUBCUTANEOUS ONCE
Status: COMPLETED | OUTPATIENT
Start: 2020-01-20 | End: 2020-01-20

## 2020-01-20 RX ORDER — CITALOPRAM 20 MG/1
10 TABLET, FILM COATED ORAL DAILY
Status: DISCONTINUED | OUTPATIENT
Start: 2020-01-20 | End: 2020-01-30 | Stop reason: HOSPADM

## 2020-01-20 RX ORDER — HYDROMORPHONE HYDROCHLORIDE 1 MG/ML
1 INJECTION, SOLUTION INTRAMUSCULAR; INTRAVENOUS; SUBCUTANEOUS ONCE
Status: COMPLETED | OUTPATIENT
Start: 2020-01-20 | End: 2020-01-20

## 2020-01-20 RX ORDER — SODIUM CHLORIDE 0.9 % (FLUSH) 0.9 %
5-40 SYRINGE (ML) INJECTION AS NEEDED
Status: DISCONTINUED | OUTPATIENT
Start: 2020-01-20 | End: 2020-01-30 | Stop reason: HOSPADM

## 2020-01-20 RX ORDER — DIPHENHYDRAMINE HYDROCHLORIDE 50 MG/ML
25 INJECTION, SOLUTION INTRAMUSCULAR; INTRAVENOUS
Status: COMPLETED | OUTPATIENT
Start: 2020-01-20 | End: 2020-01-20

## 2020-01-20 RX ORDER — SODIUM CHLORIDE 0.9 % (FLUSH) 0.9 %
10 SYRINGE (ML) INJECTION
Status: COMPLETED | OUTPATIENT
Start: 2020-01-20 | End: 2020-01-20

## 2020-01-20 RX ORDER — ONDANSETRON 2 MG/ML
4 INJECTION INTRAMUSCULAR; INTRAVENOUS
Status: DISCONTINUED | OUTPATIENT
Start: 2020-01-20 | End: 2020-01-30 | Stop reason: HOSPADM

## 2020-01-20 RX ORDER — BISMUTH SUBSALICYLATE 262 MG
1 TABLET,CHEWABLE ORAL DAILY
COMMUNITY
End: 2020-07-04

## 2020-01-20 RX ORDER — PROMETHAZINE HYDROCHLORIDE 25 MG/ML
50 INJECTION, SOLUTION INTRAMUSCULAR; INTRAVENOUS
Status: DISCONTINUED | OUTPATIENT
Start: 2020-01-20 | End: 2020-01-27

## 2020-01-20 RX ORDER — SODIUM CHLORIDE 0.9 % (FLUSH) 0.9 %
5-40 SYRINGE (ML) INJECTION EVERY 8 HOURS
Status: DISCONTINUED | OUTPATIENT
Start: 2020-01-20 | End: 2020-01-30 | Stop reason: HOSPADM

## 2020-01-20 RX ORDER — TELMISARTAN 40 MG/1
40 TABLET ORAL
Status: DISCONTINUED | OUTPATIENT
Start: 2020-01-20 | End: 2020-01-20 | Stop reason: CLARIF

## 2020-01-20 RX ORDER — SODIUM CHLORIDE 9 MG/ML
100 INJECTION, SOLUTION INTRAVENOUS CONTINUOUS
Status: DISCONTINUED | OUTPATIENT
Start: 2020-01-20 | End: 2020-01-22

## 2020-01-20 RX ORDER — HYDROMORPHONE HYDROCHLORIDE 1 MG/ML
1 INJECTION, SOLUTION INTRAMUSCULAR; INTRAVENOUS; SUBCUTANEOUS
Status: DISCONTINUED | OUTPATIENT
Start: 2020-01-20 | End: 2020-01-21

## 2020-01-20 RX ORDER — PANTOPRAZOLE SODIUM 40 MG/1
40 TABLET, DELAYED RELEASE ORAL
Status: DISCONTINUED | OUTPATIENT
Start: 2020-01-20 | End: 2020-01-30 | Stop reason: HOSPADM

## 2020-01-20 RX ORDER — HYDROXYUREA 500 MG/1
500 CAPSULE ORAL 2 TIMES DAILY
Status: DISCONTINUED | OUTPATIENT
Start: 2020-01-20 | End: 2020-01-20

## 2020-01-20 RX ORDER — FENTANYL 75 UG/H
1 PATCH TRANSDERMAL
COMMUNITY
End: 2020-02-13 | Stop reason: SDUPTHER

## 2020-01-20 RX ORDER — DIPHENHYDRAMINE HCL 12.5MG/5ML
25 LIQUID (ML) ORAL
Status: DISCONTINUED | OUTPATIENT
Start: 2020-01-20 | End: 2020-01-20

## 2020-01-20 RX ORDER — HALOPERIDOL 5 MG/ML
5 INJECTION INTRAMUSCULAR
Status: COMPLETED | OUTPATIENT
Start: 2020-01-20 | End: 2020-01-20

## 2020-01-20 RX ORDER — HYDROXYUREA 500 MG/1
500 CAPSULE ORAL 2 TIMES DAILY
Status: DISCONTINUED | OUTPATIENT
Start: 2020-01-20 | End: 2020-01-30 | Stop reason: HOSPADM

## 2020-01-20 RX ORDER — HYDROCODONE BITARTRATE AND ACETAMINOPHEN 10; 325 MG/1; MG/1
1 TABLET ORAL
Status: DISCONTINUED | OUTPATIENT
Start: 2020-01-20 | End: 2020-01-30 | Stop reason: HOSPADM

## 2020-01-20 RX ORDER — FOLIC ACID 1 MG/1
1 TABLET ORAL
Status: DISCONTINUED | OUTPATIENT
Start: 2020-01-20 | End: 2020-01-30 | Stop reason: HOSPADM

## 2020-01-20 RX ORDER — LOSARTAN POTASSIUM 25 MG/1
50 TABLET ORAL DAILY
Status: DISCONTINUED | OUTPATIENT
Start: 2020-01-20 | End: 2020-01-30 | Stop reason: HOSPADM

## 2020-01-20 RX ORDER — ENOXAPARIN SODIUM 100 MG/ML
40 INJECTION SUBCUTANEOUS EVERY 24 HOURS
Status: DISCONTINUED | OUTPATIENT
Start: 2020-01-20 | End: 2020-01-30 | Stop reason: HOSPADM

## 2020-01-20 RX ORDER — FENTANYL 75 UG/H
1 PATCH TRANSDERMAL
Status: DISCONTINUED | OUTPATIENT
Start: 2020-01-22 | End: 2020-01-28

## 2020-01-20 RX ORDER — POTASSIUM CHLORIDE 7.45 MG/ML
10 INJECTION INTRAVENOUS
Status: COMPLETED | OUTPATIENT
Start: 2020-01-20 | End: 2020-01-20

## 2020-01-20 RX ADMIN — Medication 10 ML: at 21:07

## 2020-01-20 RX ADMIN — HYDROCODONE BITARTRATE AND ACETAMINOPHEN 1 TABLET: 10; 325 TABLET ORAL at 13:51

## 2020-01-20 RX ADMIN — DIPHENHYDRAMINE HYDROCHLORIDE 25 MG: 50 INJECTION, SOLUTION INTRAMUSCULAR; INTRAVENOUS at 08:53

## 2020-01-20 RX ADMIN — SODIUM CHLORIDE 100 ML/HR: 900 INJECTION, SOLUTION INTRAVENOUS at 13:17

## 2020-01-20 RX ADMIN — HYDROMORPHONE HYDROCHLORIDE 1 MG: 1 INJECTION, SOLUTION INTRAMUSCULAR; INTRAVENOUS; SUBCUTANEOUS at 11:14

## 2020-01-20 RX ADMIN — IOPAMIDOL 100 ML: 755 INJECTION, SOLUTION INTRAVENOUS at 09:18

## 2020-01-20 RX ADMIN — HYDROMORPHONE HYDROCHLORIDE 2 MG: 1 INJECTION, SOLUTION INTRAMUSCULAR; INTRAVENOUS; SUBCUTANEOUS at 08:03

## 2020-01-20 RX ADMIN — ONDANSETRON 4 MG: 2 INJECTION INTRAMUSCULAR; INTRAVENOUS at 13:53

## 2020-01-20 RX ADMIN — ONDANSETRON 4 MG: 2 INJECTION INTRAMUSCULAR; INTRAVENOUS at 21:09

## 2020-01-20 RX ADMIN — PROMETHAZINE HYDROCHLORIDE 50 MG: 25 INJECTION INTRAMUSCULAR; INTRAVENOUS at 07:58

## 2020-01-20 RX ADMIN — SODIUM CHLORIDE 100 ML/HR: 900 INJECTION, SOLUTION INTRAVENOUS at 23:19

## 2020-01-20 RX ADMIN — LOSARTAN POTASSIUM 50 MG: 25 TABLET ORAL at 13:02

## 2020-01-20 RX ADMIN — HYDROMORPHONE HYDROCHLORIDE 1 MG: 1 INJECTION, SOLUTION INTRAMUSCULAR; INTRAVENOUS; SUBCUTANEOUS at 21:10

## 2020-01-20 RX ADMIN — POTASSIUM CHLORIDE 10 MEQ: 7.46 INJECTION, SOLUTION INTRAVENOUS at 11:17

## 2020-01-20 RX ADMIN — CITALOPRAM HYDROBROMIDE 10 MG: 20 TABLET ORAL at 13:01

## 2020-01-20 RX ADMIN — HALOPERIDOL LACTATE 5 MG: 5 INJECTION INTRAMUSCULAR at 00:18

## 2020-01-20 RX ADMIN — HYDROXYUREA 500 MG: 500 CAPSULE ORAL at 20:26

## 2020-01-20 RX ADMIN — ENOXAPARIN SODIUM 40 MG: 40 INJECTION SUBCUTANEOUS at 13:05

## 2020-01-20 RX ADMIN — HYDROXYUREA 500 MG: 500 CAPSULE ORAL at 13:03

## 2020-01-20 RX ADMIN — POTASSIUM CHLORIDE 10 MEQ: 7.46 INJECTION, SOLUTION INTRAVENOUS at 13:11

## 2020-01-20 RX ADMIN — SODIUM CHLORIDE 1000 ML: 900 INJECTION, SOLUTION INTRAVENOUS at 07:57

## 2020-01-20 RX ADMIN — Medication 10 ML: at 13:04

## 2020-01-20 RX ADMIN — Medication 10 ML: at 09:18

## 2020-01-20 RX ADMIN — PANTOPRAZOLE SODIUM 40 MG: 40 TABLET, DELAYED RELEASE ORAL at 13:02

## 2020-01-20 RX ADMIN — FOLIC ACID 1 MG: 1 TABLET ORAL at 21:07

## 2020-01-20 NOTE — ED NOTES
TRANSFER - OUT REPORT:    Verbal report given to Tasha(name) on [place] Retana  being transferred to Oncology (unit) for routine progression of care       Report consisted of patients Situation, Background, Assessment and   Recommendations(SBAR). Information from the following report(s) SBAR, Kardex, ED Summary, [facility] and Recent Results was reviewed with the receiving nurse. Lines:   Peripheral IV 01/20/20 Left Antecubital (Active)   Site Assessment Clean, dry, & intact 1/20/2020 11:41 AM   Phlebitis Assessment 0 1/20/2020 11:41 AM   Infiltration Assessment 0 1/20/2020 11:41 AM   Dressing Status Clean, dry, & intact 1/20/2020 11:41 AM   Dressing Type Tape;Transparent 1/20/2020 11:41 AM   Hub Color/Line Status Pink 1/20/2020 11:41 AM        Opportunity for questions and clarification was provided.

## 2020-01-20 NOTE — ED NOTES
Patient back from CAT scan at this time. Pt. With obvious infiltration to IV site. Current IV removed. Staff to place new ultrasound IV at this time.

## 2020-01-20 NOTE — ED TRIAGE NOTES
Arrived from home via EMS with c/o vomiting since 1500. States stomach started hurting after eating. Hx of sickle cell. States aching all over at this time.

## 2020-01-20 NOTE — ED NOTES
IVF started. Pt medicated as ordered for nausea and hip pain. Will request benadryl IV. Some immediate relief noted.

## 2020-01-20 NOTE — H&P
[address]  HISTORY AND PHYSICAL    Name:  Diana Bustillo  MR#:  [id number]  :  1960  ACCOUNT #:  [de-identified]  ADMIT DATE:  2020    PRIMARY CARE PHYSICIAN:      Doris Holland MD    PRESENTING COMPLAINT:      Vomiting and abdominal pain. HISTORY OF PRESENTING ILLNESS:      The patient is a 70-year-old Afro-American female, who has previous history significant for sickle cell, chronic pain syndrome, depression, previous history of hepatitis C, presented to the emergency room at [facility] yesterday due to persistent vomiting and groin pain yesterday. The patient tells me that she was evaluated, was not given any IV fluids or pain medication and was discharged. She comes back today for further evaluation of her vomiting and abdominal pain. The patient states that she think she has food poisoning she ate from [address] yesterday, nobody else is sick at home. The patient tried to use hydrocodone and fentanyl patch at home without any significant improvement as she was not able to pain medication  down. PAST MEDICAL HISTORY:      1. Chronic pain. 2.  Depression. 3.  Sickle cell. 4.  History of hepatitis C. PAST SURGICAL HISTORY:      Significant for cholecystectomy. ALLERGIES:      INCLUDE DILAUDID AND PERCOCET. CURRENT MEDICATIONS:      Prior to admission include citalopram 10 mg daily, fentanyl 75 mcg every 72 hours, folic acid 1 mg daily, Norco p.r.n., hydroxyurea 500 mg twice daily, omeprazole 20 mg daily, Phenergan p.r.n., Micardis 40 mg daily. SOCIOECONOMIC HISTORY:  The patient does not smoke or drink. She is on disability. She is single. REVIEW OF SYSTEMS:  Negative except as mentioned in the history of presenting illness. All systems were reviewed. No other positive finding was noted. PHYSICAL EXAMINATION:  GENERAL:  The patient is a 70-year-old Afro-American female, who is not in any acute distress.   VITAL SIGNS:  Reveal temperature of 99.8, blood pressure is 116/55, pulse is 100, respiratory rate is 18. HEENT:  Examination reveals pupils are equally reactive to light and accommodation. NECK:  Supple. There is no lymphadenopathy or JVD. CHEST:  Clear. No wheezing. No crackles. CARDIOVASCULAR SYSTEM:  S1 and S2 regular. No murmur. No S3.  ABDOMEN:  Examination does not reveal any tenderness. No guarding. No rigidity. Bowel sounds are active. EXTREMITIES:  No pedal edema. CNS:  Examination is unremarkable. The patient is alert and oriented. Has normal strength and normal reflexes. Plantars are downgoing. Cranial nerves are normal.    LABORATORY DATA:  Sodium 141, potassium is 3.2, chloride is 108, bicarb is 22, anion gap of 11, glucose 152, BUN is 15, creatinine is 1.07, calcium is 9.5, bilirubin is 1.9, protein is 9.7, albumin is 4.4, globulin is 5.3. AST is 37, ALT is 20, alk phos is 138. Lactic acid is 3.1. White count 10.1, hemoglobin of 10.3, hematocrit of 27.7, platelet count is 390,471, 87% segs, 7% lymphs. Her CT abdomen and pelvis with contrast shows no acute abnormality, irregularity of the contour of the liver status post cholecystectomy, bony findings and atrophic calcified spleen is unchanged consistent with sickle cell. ASSESSMENT AND PLAN:      The patient is a 61-year-old female, who has previous history significant for sickle cell disease. She is being admitted due to,    1. Gastroenteritis. The patient has been vomiting. We will give her intravenous fluids that will help her sickle cell pain as well. 2.  Mild hypokalemia due to vomiting. We will replace potassium. 3.  History of sickle cell with stable hemoglobin. No indication for any further workup at this time. 4.  Continue hydroxyurea. No evidence of crisis as her retic count is almost normal.  5.  Pain management.   6.  If she has persistent vomiting and gets diarrhea, at that time consider sending stool cultures, but at this time she tells me she does not have any diarrhea. 7.  Deep venous thrombosis prophylaxis.       Rhiannon Villar MD      [id number]  D:  01/20/2020 10:58  T:  01/20/2020 16:48  JOB #:  [id number]

## 2020-01-20 NOTE — PROGRESS NOTES
Pharmacy Clarification of the Prior to Admission Medication Regimen Retrospective to the Admission Medication Reconciliation    The patient was interviewed regarding clarification of the prior to admission medication regimen and was questioned regarding use of any other inhalers, topical products, over the counter medications, herbal medications, vitamin products or ophthalmic/nasal/otic medication use. Information Obtained From: RX Query, Patient    Recommendations/Findings: The following amendments were made to the patient's active medication list on file at [address]:     1) Additions:   Multivitamin    2) Removals: None    3) Changes: None    4) Pertinent Pharmacy Findings:  fentaNYL ([address]) 75 mcg/hr: Per patient, she has this patch on now in her room and applied said patch yesterday 20. PTA medication list was corrected to the following:     Prior to Admission Medications   Prescriptions Last Dose Informant Taking? HYDROcodone-acetaminophen (NORCO)  mg tablet 2020 at Unknown time Self Yes   Sig: Take 1 Tab by mouth every six (6) hours as needed for Pain for up to 30 days. Max Daily Amount: 4 Tabs. DX.D57.1   citalopram (CELEXA) 10 mg tablet 2020 at Unknown time Self Yes   Sig: TAKE 1 TABLET BY MOUTH EVERY NIGHT AT BEDTIME   fentaNYL (DURAGESIC) 75 mcg/hr 2020 at Unknown time Self Yes   Si Patch by TransDERmal route every seventy-two (72) hours. folic acid (FOLVITE) 1 mg tablet 2020 at Unknown time Self Yes   Sig: Take 1 mg by mouth nightly. hydrocortisone (CORTISONE) 1 % topical cream 2019 at Unknown time Self Yes   Sig: Apply  to affected area two (2) times daily as needed for Skin Irritation. use thin layer   hydroxyurea (HYDREA) 500 mg capsule 2020 at Unknown time Self Yes   Si tablet two times a day   multivitamin (ONE A DAY) tablet 2020 at Unknown time Self Yes   Sig: Take 1 Tab by mouth daily.    omeprazole (PRILOSEC OTC) 20 mg tablet 1/18/2020 at Unknown time Self Yes   Sig: Take 20 mg by mouth nightly. promethazine (PHENERGAN) 25 mg tablet 1/18/2020 at Unknown time Self Yes   Sig: Take 1 Tab by mouth every eight (8) hours as needed for Nausea. telmisartan (MICARDIS) 40 mg tablet 1/18/2020 at Unknown time Self Yes   Sig: Take 1 Tab by mouth nightly.       Facility-Administered Medications: None          Thank you,  Carlito Fontanez  Medication History Pharmacy Technician

## 2020-01-20 NOTE — ED PROVIDER NOTES
The history is provided by the patient, the EMS personnel and medical records. The history is limited by the condition of the patient. Vomiting    This is a recurrent problem. The current episode started 6 to 12 hours ago. The problem occurs continuously. The problem has not changed since onset. The emesis has an appearance of stomach contents and bilious material. There has been no fever. Associated symptoms include myalgias (similar to sickle cell crisis - 9/10). Pertinent negatives include no chills, no fever, no abdominal pain and no diarrhea. Risk factors include suspect food intake.         Past Medical History:   Diagnosis Date    Anemia     SC hemoglobinopathy    Chronic pain     Depression     Hypertension     Liver disease     hepatitis C; pt reports \"cured\"    Sickle cell disease ([facility] [address]        Past Surgical History:   Procedure Laterality Date    BREAST SURGERY PROCEDURE UNLISTED      2 cysts removed from R breast    CHEST SURGERY PROCEDURE UNLISTED      status post thor for removal of a benign     HX BREAST BIOPSY Right 05/2007    negative    HX CHOLECYSTECTOMY      HX ORTHOPAEDIC      bony curettage of an ostial myelitis focus         Family History:   Problem Relation Age of Onset    Hypertension Mother     Hypertension Father        Social History     Socioeconomic History    Marital status:      Spouse name: Not on file    Number of children: 2    Years of education: Not on file    Highest education level: Not on file   Occupational History    Occupation: disabled---Sickle cell disease   Social Needs    Financial resource strain: Not on file    Food insecurity:     Worry: Not on file     Inability: Not on file    Transportation needs:     Medical: Not on file     Non-medical: Not on file   Tobacco Use    Smoking status: Never Smoker    Smokeless tobacco: Never Used   Substance and Sexual Activity    Alcohol use: No    Drug use: No    Sexual activity: Yes     Partners: Male   Lifestyle    Physical activity:     Days per week: Not on file     Minutes per session: Not on file    Stress: Not on file   Relationships    Social connections:     Talks on phone: Not on file     Gets together: Not on file     Attends Scientologist service: Not on file     Active member of club or organization: Not on file     Attends meetings of clubs or organizations: Not on file     Relationship status: Not on file    Intimate partner violence:     Fear of current or ex partner: Not on file     Emotionally abused: Not on file     Physically abused: Not on file     Forced sexual activity: Not on file   Other Topics Concern    Not on file   Social History Narrative    Not on file         ALLERGIES: Dilaudid [hydromorphone (bulk)] and Percocet [oxycodone-acetaminophen]    Review of Systems   Constitutional: Negative for chills and fever. Respiratory: Negative for shortness of breath. Cardiovascular: Negative for chest pain. Gastrointestinal: Positive for nausea and vomiting. Negative for abdominal pain, constipation and diarrhea. Musculoskeletal: Positive for myalgias (similar to sickle cell crisis - 9/10). Neurological: Negative for dizziness and light-headedness. All other systems reviewed and are negative. There were no vitals filed for this visit. Physical Exam  Vitals signs and nursing note reviewed. Constitutional:       Appearance: She is well-developed. Comments: Actively vomiting     HENT:      Head: Normocephalic and atraumatic. Eyes:      Pupils: Pupils are equal, round, and reactive to light. Neck:      Musculoskeletal: Normal range of motion and neck supple. Cardiovascular:      Rate and Rhythm: Normal rate and regular rhythm. Pulmonary:      Effort: Pulmonary effort is normal.      Breath sounds: Normal breath sounds. Abdominal:      General: There is no distension. Palpations: Abdomen is soft. Tenderness: There is no tenderness.    Skin: General: Skin is warm and dry. Capillary Refill: Capillary refill takes less than 2 seconds. Neurological:      General: No focal deficit present. Mental Status: She is alert and oriented to person, place, and time. Psychiatric:         Behavior: Behavior normal.          MDM         Procedures    11:30 PM  Pt signed out to Dr. Krys Pierre for additional management. Reviewed presentation, results, and pending tests.

## 2020-01-20 NOTE — ED NOTES
Spoke with Dr. Ian Rosen. Patient only needs 1L total fluid bolus. After current bolus patient will being maintenance fluids.

## 2020-01-20 NOTE — ED NOTES
Unable to obtain peripheral [facility]. Lesley Gee notified and orders received for arterial stick for labs ordered, alternative medication therapy, and attempt to perform oral hydration.

## 2020-01-20 NOTE — ED PROVIDER NOTES
EMERGENCY DEPARTMENT HISTORY AND PHYSICAL EXAM      Date: 1/20/2020  Patient Name: Ellie Garcia    Please note that this dictation was completed with [employer], the computer voice recognition software. Quite often unanticipated grammatical, syntax, homophones, and other interpretive errors are inadvertently transcribed by the computer software. Please disregard these errors. Please excuse any errors that have escaped final proofreading. History of Presenting Illness     Chief Complaint   Patient presents with    Nausea     pt c/o N/V that started yesterday. was seen & evaluated at [facility] around 12am last night & sent home with nausea meds. pt c/o same symtoms this morning & would like to be reevaluated    Vomiting    Sickle Cell Disease       History Provided By: Patient    HPI: Ellie Garcia, 64 y.o. female, with a past medical history significant for sickle cell disease presented the emergency department complaining of abdominal pain, nausea, vomiting, muscle aches, feeling of acute pain crisis. Patient denies any fever, but admits to chills. Admits to nausea and vomiting. Unable to tolerate p.o., started with nausea vomiting yesterday, went to [facility], treated with IV fluids and antiemetics. Reports she went home and started vomiting again. Reports diffuse abdominal pain, no exacerbating or relieving factors. She is tried her hydrocodone and fentanyl patches at home, but due to her inability to tolerate p.o. she is been having difficulty taking her hydrocodone.     PCP: Dejan Ye MD    Current Facility-Administered Medications on File Prior to Encounter   Medication Dose Route Frequency Provider Last Rate Last Dose    [COMPLETED] haloperidol lactate (HALDOL) injection 5 mg  5 mg IntraMUSCular NOW Hoda Johnson MD   5 mg at 01/20/20 0018    [COMPLETED] HYDROmorphone (PF) (DILAUDID) injection 2 mg  2 mg SubCUTAneous ONCE Hoda Johnson MD   2 mg at 01/19/20 8520    [COMPLETED] promethazine (PHENERGAN) suppository 50 mg  50 mg Rectal NOW Kiya FORD MD   50 mg at 01/19/20 2156    [COMPLETED] diphenhydrAMINE (BENADRYL) injection 50 mg  50 mg IntraMUSCular NOW Kiya FORD MD   50 mg at 01/19/20 2155    [COMPLETED] ondansetron (ZOFRAN ODT) tablet 8 mg  8 mg Oral NOW Kiya FORD MD   8 mg at 01/19/20 2233    [DISCONTINUED] sodium chloride 0.9 % bolus infusion 1,000 mL  1,000 mL IntraVENous ONCE Orgill, Hoda FORD MD        [DISCONTINUED] sodium chloride 0.9 % bolus infusion 1,000 mL  1,000 mL IntraVENous ONCE Orgill, Tarun Adams MD        [DISCONTINUED] ondansetron (ZOFRAN) injection 4 mg  4 mg IntraVENous NOW Zhanna Botello MD        [DISCONTINUED] prochlorperazine (COMPAZINE) 10 mg in 0.9% sodium chloride 50 mL IVPB  10 mg IntraVENous ONCE Orgill, Hoda FORD MD        [DISCONTINUED] diphenhydrAMINE (BENADRYL) injection 50 mg  50 mg IntraVENous NOW Zhanna Botello MD         Current Outpatient Medications on File Prior to Encounter   Medication Sig Dispense Refill    fentaNYL (DURAGESIC) 75 mcg/hr 1 Patch by TransDERmal route every seventy-two (72) hours.  HYDROcodone-acetaminophen (NORCO)  mg tablet Take 1 Tab by mouth every six (6) hours as needed for Pain for up to 30 days. Max Daily Amount: 4 Tabs. DX.D57.1 120 Tab 0    hydroxyurea (HYDREA) 500 mg capsule 1 tablet two times a day 60 Cap 11    hydrocortisone (CORTISONE) 1 % topical cream Apply  to affected area two (2) times daily as needed for Skin Irritation. use thin layer 30 g 11    telmisartan (MICARDIS) 40 mg tablet Take 1 Tab by mouth nightly. 30 Tab 11    folic acid (FOLVITE) 1 mg tablet Take 1 mg by mouth nightly.  omeprazole (PRILOSEC OTC) 20 mg tablet Take 20 mg by mouth nightly.  promethazine (PHENERGAN) 25 mg tablet Take 1 Tab by mouth every eight (8) hours as needed for Nausea.  90 Tab 11    citalopram (CELEXA) 10 mg tablet TAKE 1 TABLET BY MOUTH EVERY NIGHT AT BEDTIME 80 Tab 3       Past History     Past Medical History:  Past Medical History:   Diagnosis Date    Anemia     SC hemoglobinopathy    Chronic pain     Depression     Hypertension     Liver disease     hepatitis C; pt reports \"cured\"    Sickle cell disease ([address]        Past Surgical History:  Past Surgical History:   Procedure Laterality Date    BREAST SURGERY PROCEDURE UNLISTED      2 cysts removed from R breast    CHEST SURGERY PROCEDURE UNLISTED      status post thor for removal of a benign     HX BREAST BIOPSY Right 05/2007    negative    HX CHOLECYSTECTOMY      HX ORTHOPAEDIC      bony curettage of an ostial myelitis focus       Family History:  Family History   Problem Relation Age of Onset    Hypertension Mother     Hypertension Father        Social History:  Social History     Tobacco Use    Smoking status: Never Smoker    Smokeless tobacco: Never Used   Substance Use Topics    Alcohol use: No    Drug use: No       Allergies: Allergies   Allergen Reactions    Dilaudid [Hydromorphone (Bulk)] Itching     Can tolerate with benadryl and ondansetron    Percocet [Oxycodone-Acetaminophen] Itching         Review of Systems   Review of Systems   Constitutional: Positive for chills and fatigue. Negative for fever. HENT: Negative for congestion and sore throat. Eyes: Negative for visual disturbance. Respiratory: Negative for cough and shortness of breath. Cardiovascular: Negative for chest pain and leg swelling. Gastrointestinal: Positive for abdominal pain, nausea and vomiting. Negative for blood in stool and diarrhea. Endocrine: Negative for polyuria. Genitourinary: Negative for dysuria, flank pain, vaginal bleeding and vaginal discharge. Musculoskeletal: Negative for myalgias. Skin: Negative for rash. Allergic/Immunologic: Negative for immunocompromised state. Neurological: Positive for weakness. Negative for headaches. Psychiatric/Behavioral: Negative for confusion. Physical Exam   Physical Exam   Constitutional: oriented to person, place, and time. appears well-developed and well-nourished. HENT:   Head: Normocephalic and atraumatic. Moist mucous membranes   Eyes: Pupils are equal, round, and reactive to light. Conjunctivae are normal. Right eye exhibits no discharge. Left eye exhibits no discharge. Neck: Normal range of motion. Neck supple. No tracheal deviation present. Cardiovascular: Tachycardic rate, regular rhythm and normal heart sounds. No murmur heard. Pulmonary/Chest: Effort normal and breath sounds normal. No respiratory distress. no wheezes. no rales. Abdominal: Mild, diffuse tenderness to palpation throughout the abdomen. No guarding or rebound. No acute abdomen. Musculoskeletal: Normal range of motion. exhibits no edema, tenderness or deformity. Neurological: alert and oriented to person, place, and time. Skin: Skin is warm and dry. No rash noted. No erythema. Psychiatric: behavior is normal.   Nursing note and vitals reviewed. Diagnostic Study Results     Labs -     Recent Results (from the past 12 hour(s))   CBC WITH AUTOMATED DIFF    Collection Time: 01/19/20 10:03 PM   Result Value Ref Range    WBC 11.3 (H) 3.6 - 11.0 K/uL    RBC 2.51 (L) 3.80 - 5.20 M/uL    HGB 9.6 (L) 11.5 - 16.0 g/dL    HCT 26.8 (L) 35.0 - 47.0 %    .8 (H) 80.0 - 99.0 FL    MCH 38.2 (H) 26.0 - 34.0 PG    MCHC 35.8 30.0 - 36.5 g/dL    RDW 14.6 (H) 11.5 - 14.5 %    PLATELET 613 820 - 491 K/uL    MPV 11.3 8.9 - 12.9 FL    NRBC 4.1 (H) 0  WBC    ABSOLUTE NRBC 0.46 (H) 0.00 - 0.01 K/uL    NEUTROPHILS 88 (H) 32 - 75 %    LYMPHOCYTES 5 (L) 12 - 49 %    MONOCYTES 6 5 - 13 %    EOSINOPHILS 0 0 - 7 %    BASOPHILS 0 0 - 1 %    IMMATURE GRANULOCYTES 1 (H) 0.0 - 0.5 %    ABS. NEUTROPHILS 9.9 (H) 1.8 - 8.0 K/UL    ABS. LYMPHOCYTES 0.6 (L) 0.8 - 3.5 K/UL    ABS. MONOCYTES 0.7 0.0 - 1.0 K/UL    ABS. EOSINOPHILS 0.0 0.0 - 0.4 K/UL    ABS.  BASOPHILS 0.0 0.0 - 0.1 K/UL    ABS. IMM. GRANS. 0.1 (H) 0.00 - 0.04 K/UL    DF SMEAR SCANNED      RBC COMMENTS ANISOCYTOSIS  1+        RBC COMMENTS MACROCYTOSIS  1+        RBC COMMENTS POLYCHROMASIA  1+        RBC COMMENTS TARGET CELLS     METABOLIC PANEL, COMPREHENSIVE    Collection Time: 01/19/20 10:03 PM   Result Value Ref Range    Sodium 139 136 - 145 mmol/L    Potassium 2.9 (L) 3.5 - 5.1 mmol/L    Chloride 110 (H) 97 - 108 mmol/L    CO2 18 (L) 21 - 32 mmol/L    Anion gap 11 5 - 15 mmol/L    Glucose 158 (H) 65 - 100 mg/dL    BUN 13 6 - 20 MG/DL    Creatinine 0.84 0.55 - 1.02 MG/DL    BUN/Creatinine ratio 15 12 - 20      GFR est AA >60 >60 ml/min/1.73m2    GFR est non-AA >60 >60 ml/min/1.73m2    Calcium 9.6 8.5 - 10.1 MG/DL    Bilirubin, total 1.8 (H) 0.2 - 1.0 MG/DL    ALT (SGPT) 17 12 - 78 U/L    AST (SGOT) 32 15 - 37 U/L    Alk. phosphatase 135 (H) 45 - 117 U/L    Protein, total 9.1 (H) 6.4 - 8.2 g/dL    Albumin 3.9 3.5 - 5.0 g/dL    Globulin 5.2 (H) 2.0 - 4.0 g/dL    A-G Ratio 0.8 (L) 1.1 - 2.2     RETICULOCYTE COUNT    Collection Time: 01/19/20 10:03 PM   Result Value Ref Range    Reticulocyte count 3.0 (H) 0.7 - 2.1 %    Absolute Retic Cnt. 0.0756 0.0164 - 0.0776 M/ul   SAMPLES BEING HELD    Collection Time: 01/19/20 10:20 PM   Result Value Ref Range    SAMPLES BEING HELD 1 red      COMMENT        Add-on orders for these samples will be processed based on acceptable specimen integrity and analyte stability, which may vary by analyte. Radiologic Studies -   No orders to display     CT Results  (Last 48 hours)    None        CXR Results  (Last 48 hours)    None            Medical Decision Making   I am the first provider for this patient. I reviewed the vital signs, available nursing notes, past medical history, past surgical history, family history and social history. Vital Signs-Reviewed the patient's vital signs.   Patient Vitals for the past 12 hrs:   Temp Pulse Resp BP SpO2   01/20/20 0648 99.8 °F (37.7 °C) (!) 106 18 (!) 186/109 100 %         Records Reviewed: Nursing Notes and Old Medical Records    Provider Notes (Medical Decision Making):   Patient here with abdominal pain, nausea and vomiting. She was seen yesterday, she has bounced back. I will obtain CT of the abdomen and pelvis. If patient still has pain after 2 doses of opiates will likely need to be hospitalized. Will provide IV fluids, check retake count. Disposition pending laboratory work-up and imaging  ED Course:   Initial assessment performed. The patients presenting problems have been discussed, and they are in agreement with the care plan formulated and outlined with them. I have encouraged them to ask questions as they arise throughout their visit. Critical Care Time:   None      Disposition:    Patient is being admitted to the hospital by Dr. Marky Stallings. The results of their tests and reasons for their admission have been discussed with them and/or available family. They convey agreement and understanding for the need to be admitted and for their admission diagnosis. Consultation has been made with the inpatient physician specialist for hospitalization. Diagnosis     Clinical Impression:   1. Acute gastroenteritis    2. Other chronic pain    3. Hb-SS disease with crisis [facility])        Attestations:   This note was completed by Charmaine Mott DO

## 2020-01-21 ENCOUNTER — APPOINTMENT (OUTPATIENT)
Dept: GENERAL RADIOLOGY | Age: 60
DRG: 391 | End: 2020-01-21
Attending: INTERNAL MEDICINE
Payer: MEDICARE

## 2020-01-21 LAB
ANION GAP SERPL CALC-SCNC: 4 MMOL/L (ref 5–15)
BUN SERPL-MCNC: 9 MG/DL (ref 6–20)
BUN/CREAT SERPL: 13 (ref 12–20)
CALCIUM SERPL-MCNC: 8.5 MG/DL (ref 8.5–10.1)
CHLORIDE SERPL-SCNC: 112 MMOL/L (ref 97–108)
CO2 SERPL-SCNC: 24 MMOL/L (ref 21–32)
CREAT SERPL-MCNC: 0.7 MG/DL (ref 0.55–1.02)
ERYTHROCYTE [DISTWIDTH] IN BLOOD BY AUTOMATED COUNT: 15.6 % (ref 11.5–14.5)
GLUCOSE SERPL-MCNC: 86 MG/DL (ref 65–100)
HCT VFR BLD AUTO: 24.4 % (ref 35–47)
HGB BLD-MCNC: 8.8 G/DL (ref 11.5–16)
MCH RBC QN AUTO: 39.1 PG (ref 26–34)
MCHC RBC AUTO-ENTMCNC: 36.1 G/DL (ref 30–36.5)
MCV RBC AUTO: 108.4 FL (ref 80–99)
NRBC # BLD: 0.48 K/UL (ref 0–0.01)
NRBC BLD-RTO: 4 PER 100 WBC
PLATELET # BLD AUTO: 201 K/UL (ref 150–400)
PMV BLD AUTO: 11.8 FL (ref 8.9–12.9)
POTASSIUM SERPL-SCNC: 3.5 MMOL/L (ref 3.5–5.1)
RBC # BLD AUTO: 2.25 M/UL (ref 3.8–5.2)
SODIUM SERPL-SCNC: 140 MMOL/L (ref 136–145)
WBC # BLD AUTO: 12 K/UL (ref 3.6–11)

## 2020-01-21 PROCEDURE — 65270000015 HC RM PRIVATE ONCOLOGY

## 2020-01-21 PROCEDURE — 74011250637 HC RX REV CODE- 250/637: Performed by: HOSPITALIST

## 2020-01-21 PROCEDURE — 74011250636 HC RX REV CODE- 250/636: Performed by: HOSPITALIST

## 2020-01-21 PROCEDURE — 85027 COMPLETE CBC AUTOMATED: CPT

## 2020-01-21 PROCEDURE — 36415 COLL VENOUS BLD VENIPUNCTURE: CPT

## 2020-01-21 PROCEDURE — 80048 BASIC METABOLIC PNL TOTAL CA: CPT

## 2020-01-21 PROCEDURE — 71045 X-RAY EXAM CHEST 1 VIEW: CPT

## 2020-01-21 RX ORDER — DIPHENHYDRAMINE HCL 25 MG
25 CAPSULE ORAL
Status: DISCONTINUED | OUTPATIENT
Start: 2020-01-21 | End: 2020-01-21

## 2020-01-21 RX ADMIN — Medication 10 ML: at 14:30

## 2020-01-21 RX ADMIN — ENOXAPARIN SODIUM 40 MG: 40 INJECTION SUBCUTANEOUS at 11:04

## 2020-01-21 RX ADMIN — Medication 10 ML: at 21:13

## 2020-01-21 RX ADMIN — LOSARTAN POTASSIUM 50 MG: 25 TABLET ORAL at 09:32

## 2020-01-21 RX ADMIN — HYDROXYUREA 500 MG: 500 CAPSULE ORAL at 17:48

## 2020-01-21 RX ADMIN — HYDROMORPHONE HYDROCHLORIDE 1 MG: 1 INJECTION, SOLUTION INTRAMUSCULAR; INTRAVENOUS; SUBCUTANEOUS at 17:50

## 2020-01-21 RX ADMIN — HYDROMORPHONE HYDROCHLORIDE 1 MG: 1 INJECTION, SOLUTION INTRAMUSCULAR; INTRAVENOUS; SUBCUTANEOUS at 03:58

## 2020-01-21 RX ADMIN — SODIUM CHLORIDE 100 ML/HR: 900 INJECTION, SOLUTION INTRAVENOUS at 09:32

## 2020-01-21 RX ADMIN — SODIUM CHLORIDE 100 ML/HR: 900 INJECTION, SOLUTION INTRAVENOUS at 19:41

## 2020-01-21 RX ADMIN — ONDANSETRON 4 MG: 2 INJECTION INTRAMUSCULAR; INTRAVENOUS at 17:49

## 2020-01-21 RX ADMIN — HYDROXYUREA 500 MG: 500 CAPSULE ORAL at 09:32

## 2020-01-21 RX ADMIN — PANTOPRAZOLE SODIUM 40 MG: 40 TABLET, DELAYED RELEASE ORAL at 06:40

## 2020-01-21 RX ADMIN — HYDROMORPHONE HYDROCHLORIDE 1 MG: 1 INJECTION, SOLUTION INTRAMUSCULAR; INTRAVENOUS; SUBCUTANEOUS at 10:35

## 2020-01-21 RX ADMIN — Medication 10 ML: at 10:37

## 2020-01-21 RX ADMIN — CITALOPRAM HYDROBROMIDE 10 MG: 20 TABLET ORAL at 09:48

## 2020-01-21 RX ADMIN — ONDANSETRON 4 MG: 2 INJECTION INTRAMUSCULAR; INTRAVENOUS at 03:58

## 2020-01-21 RX ADMIN — ONDANSETRON 4 MG: 2 INJECTION INTRAMUSCULAR; INTRAVENOUS at 11:03

## 2020-01-21 RX ADMIN — FOLIC ACID 1 MG: 1 TABLET ORAL at 21:12

## 2020-01-21 NOTE — PROGRESS NOTES
Bedside and Verbal shift change report given to [address] (oncoming nurse) by Hudson Cazares (offgoing nurse). Report included the following information SBAR, Kardex, Intake/Output, MAR and Recent Results. Patient rested well this night sleeping at long intervals.

## 2020-01-21 NOTE — INTERDISCIPLINARY ROUNDS
Oncology Interdisciplinary rounds were held today to discuss patient plan of care and outcomes. The following members were present: Nursing, Physician, Case Management, Pharmacy, and PT/OT Actual Length of Stay: 1 DRG GLOS: 2.6 Expected Length of Stay: 2d 14h Plan            Discharge Continue fluids Pain control D/C TBA

## 2020-01-21 NOTE — PROGRESS NOTES
Oncology End of Shift Note      Bedside shift change report given to Gaurav Sahu RN (incoming nurse) by Karen Valdes RN (outgoing nurse) on Claudene Rend. Report included the following information SBAR, Kardex and MAR. Shift Summary:   Patient tolerated care well through out shift. Nursing student, Sonal Bourgeois took care of patient this morning/afternoon and gave all scheduled medications. Patient had complaints of pain and nausea through out shift, see PRN MAR. Patient resting quietly in bed. Hourly rounding completed. Issues for Physician to Address:       Patient on Cardiac Monitoring?     [] Yes  [x] No    Rhythm:          Shift Events        Karen Valdes RN

## 2020-01-21 NOTE — PROGRESS NOTES
Spiritual Care Partner Volunteer visited patient in Oncology on January 21, 2020.     Documented by:     ROGE Meza, [facility], Staff [address]    [address] Paging Service  [phone])

## 2020-01-21 NOTE — PROGRESS NOTES
Hospitalist Progress Note    NAME: Dominique Bae   :  1960   MRN:  [id number]       Assessment / Plan:  Gastroenteritis, probably viral  Currently tolerating p.o. Reports vomiting, mostly contents of meal last 3 days  Continue IV fluids    Sickle disease,? Pain crisis  Reticulocyte count normal  Complains of joint pain, mainly hips pelvis which is consistent with prior pain flares  Patient currently no distress  PRN Dilaudid on admission, will DC for now and monitor  Management plan in the chart reviewed and noted  Continue Dilaudid for now, Benadryl added for pruritus  Wean pain regimen to p.o. soon as able  Denies shortness of breath, chest pain  Check chest x-ray for completeness  Continue hydroxyurea    Mild hypokalemia  Resolved with replacement  Monitor CBC    Hypertension  Continue losartan    Dr. Farhan Dowell to take over care tomorrow     CODE: FULL  DVT prophylaxis  Lovenox     Subjective:     Chief Complaint / Reason for Physician Visit  Intractable nausea/vomiting past 3 days. Patient reports improved so far since admission. Reports ongoing bilateral hip and groin pain, which she associates with recent sickle pain flares    Discussed with RN events overnight. Review of Systems:  Symptom Y/N Comments  Symptom Y/N Comments   Fever/Chills n   Chest Pain n    Poor Appetite n   Edema     Cough n   Abdominal Pain n    Sputum    Joint Pain y B/l hips/groin   SOB/MARS n   Pruritis/Rash     Nausea/vomit y   Tolerating PT/OT     Diarrhea n   Tolerating Diet y    Constipation n   Other       Could NOT obtain due to:      Objective:     VITALS:   Last 24hrs VS reviewed since prior progress note.  Most recent are:  Patient Vitals for the past 24 hrs:   Temp Pulse Resp BP SpO2   20 0712 99.2 °F (37.3 °C) 73 18 130/70 97 %   20 2316 99.7 °F (37.6 °C) 83 18 142/83 100 %   20 2033 98.7 °F (37.1 °C) 84 18 129/71 100 %   20 1545 99.2 °F (37.3 °C) 95 18 106/58 98 %   20 1331 99.8 °F (37.7 °C)       01/20/20 1246 100.4 °F (38 °C) 94 18 (!) 144/91 100 %       Intake/Output Summary (Last 24 hours) at 1/21/2020 1023  Last data filed at 1/21/2020 1003  Gross per 24 hour   Intake 2536.67 ml   Output    Net 2536.67 ml        PHYSICAL EXAM:  General: WD, WN. Alert, cooperative, no acute distress    EENT:  EOMI. Anicteric sclerae. MMM  Resp:  CTA bilaterally, no wheezing or rales. No accessory muscle use  CV:  Regular  rhythm,  No edema  GI:  Soft, Non distended, Non tender.  +Bowel sounds  Neurologic:  Alert and oriented X 3, normal speech,   Psych:   Good insight. Not anxious nor agitated  Skin:  No rashes. No jaundice    Reviewed most current lab test results and cultures  YES  Reviewed most current radiology test results   YES  Review and summation of old records today    NO  Reviewed patient's current orders and MAR    YES  PMH/SH reviewed - no change compared to H&P  ________________________________________________________________________  Care Plan discussed with:    Comments   Patient x    Family      RN x    Care Manager     Consultant                       x Multidiciplinary team rounds were held today with , nursing, pharmacist and clinical coordinator. Patient's plan of care was discussed; medications were reviewed and discharge planning was addressed. ________________________________________________________________________  Total NON critical care TIME:  30   Minutes    Total CRITICAL CARE TIME Spent:   Minutes non procedure based      Comments   >50% of visit spent in counseling and coordination of care     ________________________________________________________________________  Reida Radish, DO     Procedures: see electronic medical records for all procedures/Xrays and details which were not copied into this note but were reviewed prior to creation of Plan. LABS:  I reviewed today's most current labs and imaging studies.   Pertinent labs include:  Recent Labs     01/21/20  0359 01/20/20  0748 01/19/20 2203   WBC 12.0* 10.1 11.3*   HGB 8.8* 10.3* 9.6*   HCT 24.4* 27.7* 26.8*    269 233     Recent Labs     01/21/20  0359 01/20/20  0748 01/19/20 2203    141 139   K 3.5 3.2* 2.9*   * 108 110*   CO2 24 22 18*   GLU 86 152* 158*   BUN 9 15 13   CREA 0.70 1.07* 0.84   CA 8.5 9.5 9.6   ALB  --  4.4 3.9   TBILI  --  1.9* 1.8*   SGOT  --  37 32   ALT  --  20 17   INR  --  1.5*  --        Signed: Jassi Acevedo, DO

## 2020-01-21 NOTE — PROGRESS NOTES
Oncology End of Shift Note      Bedside shift change report given to Jennifer Avila RN (incoming nurse) by Alicia Ozuna (outgoing nurse) on [facility]. Report included the following information SBAR, Kardex and MAR. Shift Summary:   Patient was transferred from the ED. Her medication was given late see MAR. Pharmacy was contacted with respect to her evening meds timing. It was subsequently adjusted. [place] rounding has been done. Patient is ad erum. Patient complains of pain and was treated with PRN Meds see PRN MAR. Issues for Physician to Address:       Patient on Cardiac Monitoring?     [] Yes  [x] No    Rhythm:          Shift Events        Alicia Ozuna

## 2020-01-22 ENCOUNTER — PATIENT OUTREACH (OUTPATIENT)
Dept: INTERNAL MEDICINE CLINIC | Age: 60
End: 2020-01-22

## 2020-01-22 PROCEDURE — 74011250637 HC RX REV CODE- 250/637: Performed by: HOSPITALIST

## 2020-01-22 PROCEDURE — 74011250636 HC RX REV CODE- 250/636: Performed by: HOSPITALIST

## 2020-01-22 PROCEDURE — 74011250636 HC RX REV CODE- 250/636: Performed by: INTERNAL MEDICINE

## 2020-01-22 PROCEDURE — 65270000015 HC RM PRIVATE ONCOLOGY

## 2020-01-22 RX ORDER — DEXTROSE, SODIUM CHLORIDE, AND POTASSIUM CHLORIDE 5; .45; .15 G/100ML; G/100ML; G/100ML
50 INJECTION INTRAVENOUS CONTINUOUS
Status: DISCONTINUED | OUTPATIENT
Start: 2020-01-22 | End: 2020-01-30

## 2020-01-22 RX ORDER — DIPHENHYDRAMINE HYDROCHLORIDE 50 MG/ML
25 INJECTION, SOLUTION INTRAMUSCULAR; INTRAVENOUS
Status: DISCONTINUED | OUTPATIENT
Start: 2020-01-22 | End: 2020-01-30 | Stop reason: HOSPADM

## 2020-01-22 RX ORDER — HYDROMORPHONE HYDROCHLORIDE 1 MG/ML
1 INJECTION, SOLUTION INTRAMUSCULAR; INTRAVENOUS; SUBCUTANEOUS
Status: DISCONTINUED | OUTPATIENT
Start: 2020-01-22 | End: 2020-01-29

## 2020-01-22 RX ADMIN — CITALOPRAM HYDROBROMIDE 10 MG: 20 TABLET ORAL at 08:52

## 2020-01-22 RX ADMIN — FOLIC ACID 1 MG: 1 TABLET ORAL at 21:07

## 2020-01-22 RX ADMIN — ENOXAPARIN SODIUM 40 MG: 40 INJECTION SUBCUTANEOUS at 14:12

## 2020-01-22 RX ADMIN — HYDROCODONE BITARTRATE AND ACETAMINOPHEN 1 TABLET: 10; 325 TABLET ORAL at 06:34

## 2020-01-22 RX ADMIN — HYDROCODONE BITARTRATE AND ACETAMINOPHEN 1 TABLET: 10; 325 TABLET ORAL at 00:08

## 2020-01-22 RX ADMIN — Medication 10 ML: at 05:54

## 2020-01-22 RX ADMIN — ONDANSETRON 4 MG: 2 INJECTION INTRAMUSCULAR; INTRAVENOUS at 12:26

## 2020-01-22 RX ADMIN — HYDROCODONE BITARTRATE AND ACETAMINOPHEN 1 TABLET: 10; 325 TABLET ORAL at 12:26

## 2020-01-22 RX ADMIN — ONDANSETRON 4 MG: 2 INJECTION INTRAMUSCULAR; INTRAVENOUS at 00:11

## 2020-01-22 RX ADMIN — LOSARTAN POTASSIUM 50 MG: 25 TABLET ORAL at 08:52

## 2020-01-22 RX ADMIN — Medication 10 ML: at 21:07

## 2020-01-22 RX ADMIN — ONDANSETRON 4 MG: 2 INJECTION INTRAMUSCULAR; INTRAVENOUS at 23:08

## 2020-01-22 RX ADMIN — DEXTROSE MONOHYDRATE, SODIUM CHLORIDE, AND POTASSIUM CHLORIDE 75 ML/HR: 50; 4.5; 1.49 INJECTION, SOLUTION INTRAVENOUS at 15:00

## 2020-01-22 RX ADMIN — HYDROMORPHONE HYDROCHLORIDE 1 MG: 1 INJECTION, SOLUTION INTRAMUSCULAR; INTRAVENOUS; SUBCUTANEOUS at 22:30

## 2020-01-22 RX ADMIN — HYDROXYUREA 500 MG: 500 CAPSULE ORAL at 10:16

## 2020-01-22 RX ADMIN — DIPHENHYDRAMINE HYDROCHLORIDE 25 MG: 50 INJECTION, SOLUTION INTRAMUSCULAR; INTRAVENOUS at 17:48

## 2020-01-22 RX ADMIN — ONDANSETRON 4 MG: 2 INJECTION INTRAMUSCULAR; INTRAVENOUS at 06:32

## 2020-01-22 RX ADMIN — DIPHENHYDRAMINE HYDROCHLORIDE 25 MG: 50 INJECTION, SOLUTION INTRAMUSCULAR; INTRAVENOUS at 22:33

## 2020-01-22 RX ADMIN — HYDROMORPHONE HYDROCHLORIDE 1 MG: 1 INJECTION, SOLUTION INTRAMUSCULAR; INTRAVENOUS; SUBCUTANEOUS at 17:48

## 2020-01-22 RX ADMIN — SODIUM CHLORIDE 100 ML/HR: 900 INJECTION, SOLUTION INTRAVENOUS at 05:54

## 2020-01-22 RX ADMIN — HYDROXYUREA 500 MG: 500 CAPSULE ORAL at 17:52

## 2020-01-22 RX ADMIN — PANTOPRAZOLE SODIUM 40 MG: 40 TABLET, DELAYED RELEASE ORAL at 06:34

## 2020-01-22 NOTE — PROGRESS NOTES
Bedside and Verbal shift change report given to [address] [place] [address] (oncoming nurse) by Gavin Killian RN (offgoing nurse). Report included the following information SBAR, Kardex, Intake/Output, MAR and Recent Results. Patient rested well this night sleeping at long intervals.

## 2020-01-22 NOTE — PROGRESS NOTES
KRISTINA:  1. Home with family assistance  2. Follow up appts. Reason for Admission:   Sickle cell crisis               RRAT Score:     High             Resources/supports as identified by patient/family:   Pt's brother and daughter live in area and are supportive. Top Challenges facing patient (as identified by patient/family and CM): Finances/Medication cost?      Pt has Medicare part A and B and [place]. Transportation? Pt drives. Pt's family to transport at discharge. Support system or lack thereof? Family                     Living arrangements? Pt lives alone in a single story home with 3 steps to enter. Self-care/ADLs/Cognition? Independent in ADL/IADLs to include driving. Current Advanced Directive/Advance Care Plan:  Full code. No AMD on file. Plan for utilizing home health:    Not indicated at this time. Transition of Care Plan:              Home with family assistance and follow up appts. Pt is a 64year old woman admitted to [facility] for sickle cell crisis. Pt confirmed all demographic information. Alert and oriented x4. Pt lives alone in a single story home with 3 steps to enter. Pt's brother and daughter live nearby and are supportive. Pt is independent in ADL/IADLs to include driving. No reported DME. No history of home health or rehab. Pt indicated family will transport at time of discharge. Pt uses [facility]. PCP is Dr. Stella Galo who she sees every 3 months. Care Management Interventions  PCP Verified by CM: Yes(Dr. Stella Galo)  Mode of Transport at Discharge: Other (see comment)(Pt indicated family will transport at discharge)  Transition of Care Consult (CM Consult): Discharge Planning  Current Support Network: Family Lives [facility](Pt lives alone in a single story home with 3 steps to enter.  Brother and daughter live closeby and are supportive)  Confirm Follow Up Transport: Self(Pt drives)  Discharge Location  Discharge Placement: Home with family assistance    FLAKITO Salcedo  MSW Student

## 2020-01-22 NOTE — PROGRESS NOTES
[facility] 1/20/2020-present:  Medication    Goals Addressed                 This Visit's Progress     Coordinate pain management plan for patient. On track     1/22/2020:  SLICK received incoming call from patient stating she is presently in the hospital.  Requested contact to be made to PCP informing that she was told pain medication would be changed from hydrocodone to dilaudid but such has not occurred. States last dose was given at 12:30 but has not been effective in managing her pain (1:30pm). Contact message sent to Attending PCP re the same. EW. Patient will be followed by [facility] for KRISTINA upon D/C.

## 2020-01-22 NOTE — INTERDISCIPLINARY ROUNDS
Oncology Interdisciplinary rounds were held today to discuss patient plan of care and outcomes. The following members were present: Nursing, Physician, Case Management, Pharmacy, and PT/OT Actual Length of Stay: 2 DRG GLOS: 2.6 Expected Length of Stay: 2d 14h Plan            Discharge Management plan in place. D/C TBA

## 2020-01-23 LAB
GLUCOSE BLD STRIP.AUTO-MCNC: 140 MG/DL (ref 65–100)
GLUCOSE BLD STRIP.AUTO-MCNC: 91 MG/DL (ref 65–100)
GLUCOSE BLD STRIP.AUTO-MCNC: 99 MG/DL (ref 65–100)
SERVICE CMNT-IMP: ABNORMAL
SERVICE CMNT-IMP: NORMAL
SERVICE CMNT-IMP: NORMAL

## 2020-01-23 PROCEDURE — 65270000015 HC RM PRIVATE ONCOLOGY

## 2020-01-23 PROCEDURE — 74011250636 HC RX REV CODE- 250/636: Performed by: HOSPITALIST

## 2020-01-23 PROCEDURE — 82962 GLUCOSE BLOOD TEST: CPT

## 2020-01-23 PROCEDURE — 74011250636 HC RX REV CODE- 250/636: Performed by: INTERNAL MEDICINE

## 2020-01-23 PROCEDURE — 74011250637 HC RX REV CODE- 250/637: Performed by: HOSPITALIST

## 2020-01-23 RX ADMIN — ENOXAPARIN SODIUM 40 MG: 40 INJECTION SUBCUTANEOUS at 12:43

## 2020-01-23 RX ADMIN — DIPHENHYDRAMINE HYDROCHLORIDE 25 MG: 50 INJECTION, SOLUTION INTRAMUSCULAR; INTRAVENOUS at 12:13

## 2020-01-23 RX ADMIN — DEXTROSE MONOHYDRATE, SODIUM CHLORIDE, AND POTASSIUM CHLORIDE 75 ML/HR: 50; 4.5; 1.49 INJECTION, SOLUTION INTRAVENOUS at 19:52

## 2020-01-23 RX ADMIN — Medication 10 ML: at 08:17

## 2020-01-23 RX ADMIN — PANTOPRAZOLE SODIUM 40 MG: 40 TABLET, DELAYED RELEASE ORAL at 08:33

## 2020-01-23 RX ADMIN — HYDROMORPHONE HYDROCHLORIDE 1 MG: 1 INJECTION, SOLUTION INTRAMUSCULAR; INTRAVENOUS; SUBCUTANEOUS at 08:15

## 2020-01-23 RX ADMIN — HYDROMORPHONE HYDROCHLORIDE 1 MG: 1 INJECTION, SOLUTION INTRAMUSCULAR; INTRAVENOUS; SUBCUTANEOUS at 03:23

## 2020-01-23 RX ADMIN — ONDANSETRON 4 MG: 2 INJECTION INTRAMUSCULAR; INTRAVENOUS at 04:55

## 2020-01-23 RX ADMIN — FOLIC ACID 1 MG: 1 TABLET ORAL at 22:17

## 2020-01-23 RX ADMIN — DIPHENHYDRAMINE HYDROCHLORIDE 25 MG: 50 INJECTION, SOLUTION INTRAMUSCULAR; INTRAVENOUS at 08:12

## 2020-01-23 RX ADMIN — HYDROMORPHONE HYDROCHLORIDE 1 MG: 1 INJECTION, SOLUTION INTRAMUSCULAR; INTRAVENOUS; SUBCUTANEOUS at 19:51

## 2020-01-23 RX ADMIN — HYDROMORPHONE HYDROCHLORIDE 1 MG: 1 INJECTION, SOLUTION INTRAMUSCULAR; INTRAVENOUS; SUBCUTANEOUS at 12:13

## 2020-01-23 RX ADMIN — Medication 10 ML: at 04:56

## 2020-01-23 RX ADMIN — HYDROXYUREA 500 MG: 500 CAPSULE ORAL at 17:12

## 2020-01-23 RX ADMIN — ONDANSETRON 4 MG: 2 INJECTION INTRAMUSCULAR; INTRAVENOUS at 17:08

## 2020-01-23 RX ADMIN — Medication 10 ML: at 22:18

## 2020-01-23 RX ADMIN — HYDROMORPHONE HYDROCHLORIDE 1 MG: 1 INJECTION, SOLUTION INTRAMUSCULAR; INTRAVENOUS; SUBCUTANEOUS at 15:40

## 2020-01-23 RX ADMIN — ONDANSETRON 4 MG: 2 INJECTION INTRAMUSCULAR; INTRAVENOUS at 23:27

## 2020-01-23 RX ADMIN — DIPHENHYDRAMINE HYDROCHLORIDE 25 MG: 50 INJECTION, SOLUTION INTRAMUSCULAR; INTRAVENOUS at 15:40

## 2020-01-23 RX ADMIN — HYDROXYUREA 500 MG: 500 CAPSULE ORAL at 10:25

## 2020-01-23 RX ADMIN — Medication 10 ML: at 15:40

## 2020-01-23 RX ADMIN — LOSARTAN POTASSIUM 50 MG: 25 TABLET ORAL at 08:33

## 2020-01-23 RX ADMIN — CITALOPRAM HYDROBROMIDE 10 MG: 20 TABLET ORAL at 08:33

## 2020-01-23 RX ADMIN — DIPHENHYDRAMINE HYDROCHLORIDE 25 MG: 50 INJECTION, SOLUTION INTRAMUSCULAR; INTRAVENOUS at 03:24

## 2020-01-23 RX ADMIN — DIPHENHYDRAMINE HYDROCHLORIDE 25 MG: 50 INJECTION, SOLUTION INTRAMUSCULAR; INTRAVENOUS at 19:52

## 2020-01-23 RX ADMIN — ONDANSETRON 4 MG: 2 INJECTION INTRAMUSCULAR; INTRAVENOUS at 10:57

## 2020-01-23 RX ADMIN — DEXTROSE MONOHYDRATE, SODIUM CHLORIDE, AND POTASSIUM CHLORIDE 75 ML/HR: 50; 4.5; 1.49 INJECTION, SOLUTION INTRAVENOUS at 08:28

## 2020-01-23 NOTE — PROGRESS NOTES
General Daily Progress Note    Admit Date: 1/20/2020    Subjective:     Patient continues to complain of pain. Current Facility-Administered Medications   Medication Dose Route Frequency    HYDROmorphone (PF) (DILAUDID) injection 1 mg  1 mg IntraVENous Q4H PRN    diphenhydrAMINE (BENADRYL) injection 25 mg  25 mg IntraVENous Q4H PRN    dextrose 5% - 0.45% NaCl with KCl 20 mEq/L infusion  75 mL/hr IntraVENous CONTINUOUS    promethazine (PHENERGAN) injection 50 mg  50 mg IntraMUSCular Q6H PRN    sodium chloride (NS) flush 5-40 mL  5-40 mL IntraVENous Q8H    sodium chloride (NS) flush 5-40 mL  5-40 mL IntraVENous PRN    enoxaparin (LOVENOX) injection 40 mg  40 mg SubCUTAneous Q24H    citalopram (CELEXA) tablet 10 mg  10 mg Oral DAILY    fentaNYL (DURAGESIC) 75 mcg/hr patch 1 Patch  1 Patch TransDERmal J86H    folic acid (FOLVITE) tablet 1 mg  1 mg Oral QHS    HYDROcodone-acetaminophen (NORCO)  mg tablet 1 Tab  1 Tab Oral Q6H PRN    pantoprazole (PROTONIX) tablet 40 mg  40 mg Oral ACB    ondansetron (ZOFRAN) injection 4 mg  4 mg IntraVENous Q6H PRN    losartan (COZAAR) tablet 50 mg  50 mg Oral DAILY    hydroxyurea (HYDREA) chemo cap 500 mg  500 mg Oral BID        Review of Systems  A comprehensive review of systems was negative. Objective:     Patient Vitals for the past 24 hrs:   BP Temp Pulse Resp SpO2   01/23/20 0833 (!) 158/95  73     01/22/20 2327 151/82 98.4 °F (36.9 °C) 74 16 100 %   01/22/20 1931 154/89 98.8 °F (37.1 °C) 75 16 99 %   01/22/20 1600 154/89 99.1 °F (37.3 °C) 76 16 100 %     No intake/output data recorded.   01/21 1901 - 01/23 0700  In: 240 [P.O.:240]  Out: -     Physical Exam:   Visit Vitals  BP (!) 158/95   Pulse 73   Temp 98.4 °F (36.9 °C)   Resp 16   Ht 5' 11\" (1.803 m)   Wt 219 lb (99.3 kg)   SpO2 100%   Breastfeeding No   BMI 30.54 kg/m²     General appearance: alert, cooperative, no distress, appears stated age  Neck: supple, symmetrical, trachea midline, no adenopathy, thyroid: not enlarged, symmetric, no tenderness/mass/nodules, no carotid bruit and no JVD  Lungs: clear to auscultation bilaterally  Heart: regular rate and rhythm, S1, S2 normal, no murmur, click, rub or gallop  Abdomen: soft, non-tender. Bowel sounds normal. No masses,  no organomegaly  Extremities: extremities normal, atraumatic, no cyanosis or edema        Data Review No results found for this or any previous visit (from the past 24 hour(s)). Assessment:     Active Problems:    Sickle cell crisis ([facility] [address] (5/22/2017)        Plan:     1. Continue treatment of painful crises. 2.  GI symptoms abated. 3.  Will mobilize.

## 2020-01-23 NOTE — INTERDISCIPLINARY ROUNDS
Oncology Interdisciplinary rounds were held today to discuss patient plan of care and outcomes. The following members were present: Nursing, Physician, Case Management, Pharmacy, and PT/OT Actual Length of Stay: 3 DRG GLOS: 2.6 Expected Length of Stay: 2d 14h Plan            Discharge Pain management plan Transport [facility] 
D/C TBA

## 2020-01-23 NOTE — PROGRESS NOTES
General Daily Progress Note    Admit Date: 1/20/2020    Subjective:     Patient complains of pain----GI symptoms improved. Current Facility-Administered Medications   Medication Dose Route Frequency    HYDROmorphone (PF) (DILAUDID) injection 1 mg  1 mg IntraVENous Q4H PRN    diphenhydrAMINE (BENADRYL) injection 25 mg  25 mg IntraVENous Q4H PRN    dextrose 5% - 0.45% NaCl with KCl 20 mEq/L infusion  75 mL/hr IntraVENous CONTINUOUS    promethazine (PHENERGAN) injection 50 mg  50 mg IntraMUSCular Q6H PRN    sodium chloride (NS) flush 5-40 mL  5-40 mL IntraVENous Q8H    sodium chloride (NS) flush 5-40 mL  5-40 mL IntraVENous PRN    enoxaparin (LOVENOX) injection 40 mg  40 mg SubCUTAneous Q24H    citalopram (CELEXA) tablet 10 mg  10 mg Oral DAILY    fentaNYL (DURAGESIC) 75 mcg/hr patch 1 Patch  1 Patch TransDERmal [id number]    folic acid (FOLVITE) tablet 1 mg  1 mg Oral QHS    HYDROcodone-acetaminophen (NORCO)  mg tablet 1 Tab  1 Tab Oral Q6H PRN    pantoprazole (PROTONIX) tablet 40 mg  40 mg Oral ACB    ondansetron (ZOFRAN) injection 4 mg  4 mg IntraVENous Q6H PRN    losartan (COZAAR) tablet 50 mg  50 mg Oral DAILY    hydroxyurea (HYDREA) chemo cap 500 mg  500 mg Oral BID        Review of Systems  A comprehensive review of systems was negative. Objective:     Patient Vitals for the past 24 hrs:   BP Temp Pulse Resp SpO2   01/22/20 1931 154/89 98.8 °F (37.1 °C) 75 16 99 %   01/22/20 1600 154/89 99.1 °F (37.3 °C) 76 16 100 %   01/22/20 0850 158/82 99.5 °F (37.5 °C) 77 18 100 %   01/22/20 0011 136/74 98.9 °F (37.2 °C) 72 18 98 %     No intake/output data recorded.   01/21 0701 - 01/22 1900  In: 780 [P.O.:480; I.V.:300]  Out: -     Physical Exam:   Visit Vitals  /89 (BP 1 Location: Right arm, BP Patient Position: At rest)   Pulse 75   Temp 98.8 °F (37.1 °C)   Resp 16   Ht 5' 11\" (1.803 m)   Wt 219 lb (99.3 kg)   SpO2 99%   Breastfeeding No   BMI 30.54 kg/m²     General appearance: alert, cooperative, no distress, appears stated age  Neck: supple, symmetrical, trachea midline, no adenopathy, thyroid: not enlarged, symmetric, no tenderness/mass/nodules, no carotid bruit and no JVD  Lungs: clear to auscultation bilaterally  Heart: regular rate and rhythm, S1, S2 normal, no murmur, click, rub or gallop  Abdomen: soft, non-tender. Bowel sounds normal. No masses,  no organomegaly  Extremities: extremities normal, atraumatic, no cyanosis or edema        Data Review No results found for this or any previous visit (from the past 24 hour(s)). Assessment:     Active Problems:    Sickle cell crisis ([facility] [address] (5/22/2017)        Plan:     1. Continue symptomatic treatment for presumed gastritis from a viral process or food poisoning. 2.  Mild painful crises. Continue general treatment.

## 2020-01-24 LAB
BASOPHILS # BLD: 0.3 K/UL (ref 0–0.1)
BASOPHILS NFR BLD: 4 % (ref 0–1)
BLASTS NFR BLD MANUAL: 0 %
DIFFERENTIAL METHOD BLD: ABNORMAL
EOSINOPHIL # BLD: 0.3 K/UL (ref 0–0.4)
EOSINOPHIL NFR BLD: 4 % (ref 0–7)
ERYTHROCYTE [DISTWIDTH] IN BLOOD BY AUTOMATED COUNT: 14.8 % (ref 11.5–14.5)
GLUCOSE BLD STRIP.AUTO-MCNC: 119 MG/DL (ref 65–100)
GLUCOSE BLD STRIP.AUTO-MCNC: 145 MG/DL (ref 65–100)
GLUCOSE BLD STRIP.AUTO-MCNC: 79 MG/DL (ref 65–100)
GLUCOSE BLD STRIP.AUTO-MCNC: 99 MG/DL (ref 65–100)
HCT VFR BLD AUTO: 21.7 % (ref 35–47)
HGB BLD-MCNC: 7.9 G/DL (ref 11.5–16)
IMM GRANULOCYTES # BLD AUTO: 0 K/UL
IMM GRANULOCYTES NFR BLD AUTO: 0 %
LYMPHOCYTES # BLD: 4.7 K/UL (ref 0.8–3.5)
LYMPHOCYTES NFR BLD: 68 % (ref 12–49)
MCH RBC QN AUTO: 39.9 PG (ref 26–34)
MCHC RBC AUTO-ENTMCNC: 36.4 G/DL (ref 30–36.5)
MCV RBC AUTO: 109.6 FL (ref 80–99)
METAMYELOCYTES NFR BLD MANUAL: 0 %
MONOCYTES # BLD: 0.3 K/UL (ref 0–1)
MONOCYTES NFR BLD: 5 % (ref 5–13)
MYELOCYTES NFR BLD MANUAL: 0 %
NEUTS BAND NFR BLD MANUAL: 0 % (ref 0–6)
NEUTS SEG # BLD: 1.3 K/UL (ref 1.8–8)
NEUTS SEG NFR BLD: 19 % (ref 32–75)
NRBC # BLD: 0.67 K/UL (ref 0–0.01)
NRBC BLD-RTO: 9.7 PER 100 WBC
OTHER CELLS NFR BLD MANUAL: 0 %
PLATELET # BLD AUTO: 198 K/UL (ref 150–400)
PLATELET COMMENTS,PCOM: ABNORMAL
PMV BLD AUTO: 12 FL (ref 8.9–12.9)
PROMYELOCYTES NFR BLD MANUAL: 0 %
RBC # BLD AUTO: 1.98 M/UL (ref 3.8–5.2)
RBC MORPH BLD: ABNORMAL
SERVICE CMNT-IMP: ABNORMAL
SERVICE CMNT-IMP: ABNORMAL
SERVICE CMNT-IMP: NORMAL
SERVICE CMNT-IMP: NORMAL
WBC # BLD AUTO: 6.9 K/UL (ref 3.6–11)

## 2020-01-24 PROCEDURE — 65270000015 HC RM PRIVATE ONCOLOGY

## 2020-01-24 PROCEDURE — 74011250636 HC RX REV CODE- 250/636: Performed by: HOSPITALIST

## 2020-01-24 PROCEDURE — 74011250637 HC RX REV CODE- 250/637: Performed by: HOSPITALIST

## 2020-01-24 PROCEDURE — 85027 COMPLETE CBC AUTOMATED: CPT

## 2020-01-24 PROCEDURE — 74011250636 HC RX REV CODE- 250/636: Performed by: INTERNAL MEDICINE

## 2020-01-24 PROCEDURE — 82962 GLUCOSE BLOOD TEST: CPT

## 2020-01-24 PROCEDURE — 36415 COLL VENOUS BLD VENIPUNCTURE: CPT

## 2020-01-24 RX ADMIN — DIPHENHYDRAMINE HYDROCHLORIDE 25 MG: 50 INJECTION, SOLUTION INTRAMUSCULAR; INTRAVENOUS at 15:57

## 2020-01-24 RX ADMIN — DIPHENHYDRAMINE HYDROCHLORIDE 25 MG: 50 INJECTION, SOLUTION INTRAMUSCULAR; INTRAVENOUS at 19:58

## 2020-01-24 RX ADMIN — PANTOPRAZOLE SODIUM 40 MG: 40 TABLET, DELAYED RELEASE ORAL at 09:54

## 2020-01-24 RX ADMIN — ONDANSETRON 4 MG: 2 INJECTION INTRAMUSCULAR; INTRAVENOUS at 18:20

## 2020-01-24 RX ADMIN — DIPHENHYDRAMINE HYDROCHLORIDE 25 MG: 50 INJECTION, SOLUTION INTRAMUSCULAR; INTRAVENOUS at 08:35

## 2020-01-24 RX ADMIN — LOSARTAN POTASSIUM 50 MG: 25 TABLET ORAL at 09:54

## 2020-01-24 RX ADMIN — HYDROMORPHONE HYDROCHLORIDE 1 MG: 1 INJECTION, SOLUTION INTRAMUSCULAR; INTRAVENOUS; SUBCUTANEOUS at 00:03

## 2020-01-24 RX ADMIN — DIPHENHYDRAMINE HYDROCHLORIDE 25 MG: 50 INJECTION, SOLUTION INTRAMUSCULAR; INTRAVENOUS at 11:59

## 2020-01-24 RX ADMIN — FOLIC ACID 1 MG: 1 TABLET ORAL at 22:11

## 2020-01-24 RX ADMIN — HYDROMORPHONE HYDROCHLORIDE 1 MG: 1 INJECTION, SOLUTION INTRAMUSCULAR; INTRAVENOUS; SUBCUTANEOUS at 15:57

## 2020-01-24 RX ADMIN — DIPHENHYDRAMINE HYDROCHLORIDE 25 MG: 50 INJECTION, SOLUTION INTRAMUSCULAR; INTRAVENOUS at 23:59

## 2020-01-24 RX ADMIN — Medication 10 ML: at 16:01

## 2020-01-24 RX ADMIN — DIPHENHYDRAMINE HYDROCHLORIDE 25 MG: 50 INJECTION, SOLUTION INTRAMUSCULAR; INTRAVENOUS at 00:03

## 2020-01-24 RX ADMIN — DEXTROSE MONOHYDRATE, SODIUM CHLORIDE, AND POTASSIUM CHLORIDE 75 ML/HR: 50; 4.5; 1.49 INJECTION, SOLUTION INTRAVENOUS at 22:11

## 2020-01-24 RX ADMIN — ONDANSETRON 4 MG: 2 INJECTION INTRAMUSCULAR; INTRAVENOUS at 12:32

## 2020-01-24 RX ADMIN — HYDROMORPHONE HYDROCHLORIDE 1 MG: 1 INJECTION, SOLUTION INTRAMUSCULAR; INTRAVENOUS; SUBCUTANEOUS at 11:59

## 2020-01-24 RX ADMIN — Medication 10 ML: at 22:11

## 2020-01-24 RX ADMIN — ONDANSETRON 4 MG: 2 INJECTION INTRAMUSCULAR; INTRAVENOUS at 05:56

## 2020-01-24 RX ADMIN — HYDROMORPHONE HYDROCHLORIDE 1 MG: 1 INJECTION, SOLUTION INTRAMUSCULAR; INTRAVENOUS; SUBCUTANEOUS at 08:35

## 2020-01-24 RX ADMIN — DEXTROSE MONOHYDRATE, SODIUM CHLORIDE, AND POTASSIUM CHLORIDE 75 ML/HR: 50; 4.5; 1.49 INJECTION, SOLUTION INTRAVENOUS at 09:40

## 2020-01-24 RX ADMIN — HYDROXYUREA 500 MG: 500 CAPSULE ORAL at 18:16

## 2020-01-24 RX ADMIN — Medication 10 ML: at 05:56

## 2020-01-24 RX ADMIN — HYDROXYUREA 500 MG: 500 CAPSULE ORAL at 10:02

## 2020-01-24 RX ADMIN — HYDROMORPHONE HYDROCHLORIDE 1 MG: 1 INJECTION, SOLUTION INTRAMUSCULAR; INTRAVENOUS; SUBCUTANEOUS at 19:58

## 2020-01-24 RX ADMIN — HYDROMORPHONE HYDROCHLORIDE 1 MG: 1 INJECTION, SOLUTION INTRAMUSCULAR; INTRAVENOUS; SUBCUTANEOUS at 23:59

## 2020-01-24 RX ADMIN — ENOXAPARIN SODIUM 40 MG: 40 INJECTION SUBCUTANEOUS at 11:15

## 2020-01-24 RX ADMIN — CITALOPRAM HYDROBROMIDE 10 MG: 20 TABLET ORAL at 09:54

## 2020-01-24 RX ADMIN — HYDROMORPHONE HYDROCHLORIDE 1 MG: 1 INJECTION, SOLUTION INTRAMUSCULAR; INTRAVENOUS; SUBCUTANEOUS at 04:13

## 2020-01-24 RX ADMIN — DIPHENHYDRAMINE HYDROCHLORIDE 25 MG: 50 INJECTION, SOLUTION INTRAMUSCULAR; INTRAVENOUS at 04:13

## 2020-01-24 NOTE — PROGRESS NOTES
General Daily Progress Note    Admit Date: 1/20/2020    Subjective:     Patient continues to complain of pain. Current Facility-Administered Medications   Medication Dose Route Frequency    HYDROmorphone (PF) (DILAUDID) injection 1 mg  1 mg IntraVENous Q4H PRN    diphenhydrAMINE (BENADRYL) injection 25 mg  25 mg IntraVENous Q4H PRN    dextrose 5% - 0.45% NaCl with KCl 20 mEq/L infusion  75 mL/hr IntraVENous CONTINUOUS    promethazine (PHENERGAN) injection 50 mg  50 mg IntraMUSCular Q6H PRN    sodium chloride (NS) flush 5-40 mL  5-40 mL IntraVENous Q8H    sodium chloride (NS) flush 5-40 mL  5-40 mL IntraVENous PRN    enoxaparin (LOVENOX) injection 40 mg  40 mg SubCUTAneous Q24H    citalopram (CELEXA) tablet 10 mg  10 mg Oral DAILY    fentaNYL (DURAGESIC) 75 mcg/hr patch 1 Patch  1 Patch TransDERmal [id number]    folic acid (FOLVITE) tablet 1 mg  1 mg Oral QHS    HYDROcodone-acetaminophen (NORCO)  mg tablet 1 Tab  1 Tab Oral Q6H PRN    pantoprazole (PROTONIX) tablet 40 mg  40 mg Oral ACB    ondansetron (ZOFRAN) injection 4 mg  4 mg IntraVENous Q6H PRN    losartan (COZAAR) tablet 50 mg  50 mg Oral DAILY    hydroxyurea (HYDREA) chemo cap 500 mg  500 mg Oral BID        Review of Systems  A comprehensive review of systems was negative. Objective:     Patient Vitals for the past 24 hrs:   BP Temp Pulse Resp SpO2   01/24/20 0741 (!) 142/97 98.8 °F (37.1 °C) 81 16 100 %   01/23/20 2345  98.2 °F (36.8 °C)      01/23/20 1940 150/90 98.7 °F (37.1 °C) 88 16 99 %   01/23/20 1516 119/79 99.5 °F (37.5 °C) 76 16 100 %   01/23/20 0858 (!) 158/95 99.1 °F (37.3 °C) 73 14 98 %     No intake/output data recorded. No intake/output data recorded.     Physical Exam:   Visit Vitals  BP (!) 142/97   Pulse 81   Temp 98.8 °F (37.1 °C)   Resp 16   Ht 5' 11\" (1.803 m)   Wt 219 lb (99.3 kg)   SpO2 100%   Breastfeeding No   BMI 30.54 kg/m²     General appearance: alert, cooperative, no distress, appears stated age  Neck: supple, symmetrical, trachea midline, no adenopathy, thyroid: not enlarged, symmetric, no tenderness/mass/nodules, no carotid bruit and no JVD  Lungs: clear to auscultation bilaterally  Heart: regular rate and rhythm, S1, S2 normal, no murmur, click, rub or gallop  Abdomen: soft, non-tender. Bowel sounds normal. No masses,  no organomegaly  Extremities: extremities normal, atraumatic, no cyanosis or edema        Data Review   Recent Results (from the past 24 hour(s))   GLUCOSE, POC    Collection Time: 01/23/20 11:35 AM   Result Value Ref Range    Glucose (POC) 91 65 - 100 mg/dL    Performed by Brian Evans    GLUCOSE, POC    Collection Time: 01/23/20  5:05 PM   Result Value Ref Range    Glucose (POC) 140 (H) 65 - 100 mg/dL    Performed by [address] [place] [address] ([facility]) PCT    GLUCOSE, POC    Collection Time: 01/23/20 10:16 PM   Result Value Ref Range    Glucose (POC) 99 65 - 100 mg/dL    Performed by Sen Beasley (RN)    CBC WITH MANUAL DIFF    Collection Time: 01/24/20  4:06 AM   Result Value Ref Range    WBC 6.9 3.6 - 11.0 K/uL    RBC 1.98 (L) 3.80 - 5.20 M/uL    HGB 7.9 (L) 11.5 - 16.0 g/dL    HCT 21.7 (L) 35.0 - 47.0 %    .6 (H) 80.0 - 99.0 FL    MCH 39.9 (H) 26.0 - 34.0 PG    MCHC 36.4 30.0 - 36.5 g/dL    RDW 14.8 (H) 11.5 - 14.5 %    PLATELET 109 222 - 427 K/uL    MPV 12.0 8.9 - 12.9 FL    NRBC 9.7 (H) 0  WBC    ABSOLUTE NRBC 0.67 (H) 0.00 - 0.01 K/uL    NEUTROPHILS 19 (L) 32 - 75 %    BAND NEUTROPHILS 0 0 - 6 %    LYMPHOCYTES 68 (H) 12 - 49 %    MONOCYTES 5 5 - 13 %    EOSINOPHILS 4 0 - 7 %    BASOPHILS 4 (H) 0 - 1 %    METAMYELOCYTES 0 0 %    MYELOCYTES 0 0 %    PROMYELOCYTES 0 0 %    BLASTS 0 0 %    OTHER CELL 0 0      IMMATURE GRANULOCYTES 0 %    ABS. NEUTROPHILS 1.3 (L) 1.8 - 8.0 K/UL    ABS. LYMPHOCYTES 4.7 (H) 0.8 - 3.5 K/UL    ABS. MONOCYTES 0.3 0.0 - 1.0 K/UL    ABS. EOSINOPHILS 0.3 0.0 - 0.4 K/UL    ABS. BASOPHILS 0.3 (H) 0.0 - 0.1 K/UL    ABS. IMM.  GRANS. 0.0 K/UL    DF SMEAR SCANNED PLATELET COMMENTS Large Platelets      RBC COMMENTS ANISOCYTOSIS  1+        RBC COMMENTS MACROCYTOSIS  1+        RBC COMMENTS MICROCYTOSIS  1+        RBC COMMENTS OVALOCYTES  1+        RBC COMMENTS HYPOCHROMIA  1+       GLUCOSE, POC    Collection Time: 01/24/20  7:44 AM   Result Value Ref Range    Glucose (POC) 99 65 - 100 mg/dL    Performed by Rene Heart            Assessment:     Active Problems:    Sickle cell crisis ([address] (5/22/2017)        Plan:     1. Continue treatment of painful crises. 2.  GI aspect has resolved.

## 2020-01-24 NOTE — PROGRESS NOTES
Problem: Falls - Risk of  Goal: *Absence of Falls  Description  Document Kevin Hernandez Fall Risk and appropriate interventions in the flowsheet.   Outcome: Progressing Towards Goal  Note: Fall Risk Interventions:  Mobility Interventions: Communicate number of staff needed for ambulation/transfer, Patient to call before getting OOB         Medication Interventions: Teach patient to arise slowly

## 2020-01-24 NOTE — PROGRESS NOTES
Bedside shift change report given to Scott (oncoming nurse) by Lew Torres (offgoing nurse). Report included the following information SBAR, Kardex and MAR.

## 2020-01-24 NOTE — PROGRESS NOTES
Oncology End of Shift Note      Bedside shift change report given to [facility], RN (incoming nurse) by Tomas Robertson RN (outgoing nurse) on [facility]. Report included the following information SBAR, Kardex and MAR. Shift Summary:   No change in patient condition throughout shift. Patient requested PRN hydromorphone, benadryl, and zofran as prescribed. Chemo capsule given. Tolerated diet well. Issues for Physician to Address:  none     Patient on Cardiac Monitoring?    [] Yes  [x] No    Rhythm:          Shift Events      Tomas Robertson RN

## 2020-01-25 PROBLEM — M87.059 AVN OF FEMUR (HCC): Status: ACTIVE | Noted: 2020-01-25

## 2020-01-25 PROBLEM — E87.6 HYPOKALEMIA: Status: ACTIVE | Noted: 2020-01-25

## 2020-01-25 LAB
GLUCOSE BLD STRIP.AUTO-MCNC: 110 MG/DL (ref 65–100)
GLUCOSE BLD STRIP.AUTO-MCNC: 120 MG/DL (ref 65–100)
GLUCOSE BLD STRIP.AUTO-MCNC: 145 MG/DL (ref 65–100)
GLUCOSE BLD STRIP.AUTO-MCNC: 93 MG/DL (ref 65–100)
SERVICE CMNT-IMP: ABNORMAL
SERVICE CMNT-IMP: NORMAL

## 2020-01-25 PROCEDURE — 74011250636 HC RX REV CODE- 250/636: Performed by: HOSPITALIST

## 2020-01-25 PROCEDURE — 65270000015 HC RM PRIVATE ONCOLOGY

## 2020-01-25 PROCEDURE — 74011250636 HC RX REV CODE- 250/636: Performed by: INTERNAL MEDICINE

## 2020-01-25 PROCEDURE — 82962 GLUCOSE BLOOD TEST: CPT

## 2020-01-25 PROCEDURE — 74011250637 HC RX REV CODE- 250/637: Performed by: HOSPITALIST

## 2020-01-25 RX ADMIN — ONDANSETRON 4 MG: 2 INJECTION INTRAMUSCULAR; INTRAVENOUS at 00:27

## 2020-01-25 RX ADMIN — Medication 10 ML: at 06:34

## 2020-01-25 RX ADMIN — DIPHENHYDRAMINE HYDROCHLORIDE 25 MG: 50 INJECTION, SOLUTION INTRAMUSCULAR; INTRAVENOUS at 11:46

## 2020-01-25 RX ADMIN — DIPHENHYDRAMINE HYDROCHLORIDE 25 MG: 50 INJECTION, SOLUTION INTRAMUSCULAR; INTRAVENOUS at 08:09

## 2020-01-25 RX ADMIN — DIPHENHYDRAMINE HYDROCHLORIDE 25 MG: 50 INJECTION, SOLUTION INTRAMUSCULAR; INTRAVENOUS at 16:02

## 2020-01-25 RX ADMIN — Medication 10 ML: at 21:11

## 2020-01-25 RX ADMIN — DIPHENHYDRAMINE HYDROCHLORIDE 25 MG: 50 INJECTION, SOLUTION INTRAMUSCULAR; INTRAVENOUS at 20:02

## 2020-01-25 RX ADMIN — PANTOPRAZOLE SODIUM 40 MG: 40 TABLET, DELAYED RELEASE ORAL at 08:09

## 2020-01-25 RX ADMIN — ONDANSETRON 4 MG: 2 INJECTION INTRAMUSCULAR; INTRAVENOUS at 11:46

## 2020-01-25 RX ADMIN — HYDROMORPHONE HYDROCHLORIDE 1 MG: 1 INJECTION, SOLUTION INTRAMUSCULAR; INTRAVENOUS; SUBCUTANEOUS at 04:09

## 2020-01-25 RX ADMIN — FOLIC ACID 1 MG: 1 TABLET ORAL at 21:10

## 2020-01-25 RX ADMIN — ENOXAPARIN SODIUM 40 MG: 40 INJECTION SUBCUTANEOUS at 11:46

## 2020-01-25 RX ADMIN — Medication 10 ML: at 15:26

## 2020-01-25 RX ADMIN — HYDROXYUREA 500 MG: 500 CAPSULE ORAL at 17:09

## 2020-01-25 RX ADMIN — HYDROMORPHONE HYDROCHLORIDE 1 MG: 1 INJECTION, SOLUTION INTRAMUSCULAR; INTRAVENOUS; SUBCUTANEOUS at 16:01

## 2020-01-25 RX ADMIN — ONDANSETRON 4 MG: 2 INJECTION INTRAMUSCULAR; INTRAVENOUS at 06:33

## 2020-01-25 RX ADMIN — CITALOPRAM HYDROBROMIDE 10 MG: 20 TABLET ORAL at 08:28

## 2020-01-25 RX ADMIN — DIPHENHYDRAMINE HYDROCHLORIDE 25 MG: 50 INJECTION, SOLUTION INTRAMUSCULAR; INTRAVENOUS at 04:09

## 2020-01-25 RX ADMIN — HYDROMORPHONE HYDROCHLORIDE 1 MG: 1 INJECTION, SOLUTION INTRAMUSCULAR; INTRAVENOUS; SUBCUTANEOUS at 08:09

## 2020-01-25 RX ADMIN — HYDROMORPHONE HYDROCHLORIDE 1 MG: 1 INJECTION, SOLUTION INTRAMUSCULAR; INTRAVENOUS; SUBCUTANEOUS at 11:46

## 2020-01-25 RX ADMIN — HYDROMORPHONE HYDROCHLORIDE 1 MG: 1 INJECTION, SOLUTION INTRAMUSCULAR; INTRAVENOUS; SUBCUTANEOUS at 20:01

## 2020-01-25 RX ADMIN — HYDROXYUREA 500 MG: 500 CAPSULE ORAL at 11:53

## 2020-01-25 RX ADMIN — ONDANSETRON 4 MG: 2 INJECTION INTRAMUSCULAR; INTRAVENOUS at 18:00

## 2020-01-25 RX ADMIN — LOSARTAN POTASSIUM 50 MG: 25 TABLET ORAL at 08:28

## 2020-01-25 NOTE — PROGRESS NOTES
Problem: Falls - Risk of  Goal: *Absence of Falls  Description  Document Kevin Soria Fall Risk and appropriate interventions in the flowsheet.   Outcome: Progressing Towards Goal  Note: Fall Risk Interventions:  Mobility Interventions: Communicate number of staff needed for ambulation/transfer, Patient to call before getting OOB         Medication Interventions: Teach patient to arise slowly

## 2020-01-25 NOTE — PROGRESS NOTES
Hospital Progress Note    NAME:  Mayra Lewis   :   1960   MRN:  [id number]     Date/Time:  2020 11:17 AM    Plan:   1. Pain control  2. Correct k+  Risk of Deterioration: Low  []           Moderate  [x]           High  []                 Assessment:   Principal Problem:    Sickle cell crisis ([facility] (2017)    Active Problems:    AVN of femur ([facility] (2020)      Hypokalemia (2020)      Hypertension (10/3/2014)      Acute gastroenteritis (10/27/2019)       Admitting notes:  82-year-old Afro-American female, who has previous history significant for sickle cell, chronic pain syndrome, depression, previous history of hepatitis C, presented to the emergency room at [facility] yesterday due to persistent vomiting and groin pain yesterday. The patient tells me that she was evaluated, was not given any IV fluids or pain medication and was discharged. She comes back today for further evaluation of her vomiting and abdominal pain. The patient states that she think she has food poisoning she ate from [address] yesterday, nobody else is sick at home. The patient tried to use hydrocodone and fentanyl patch at home without any significant improvement as she was not able to pain medication  down.       Subjective:     Feeling better except painful hip  11 Point Review of Systems:   Negative except no cp/sob    []            Unable to obtain ROS due to:       []            mental status change []            sedated []            intubated     Social History     Tobacco Use    Smoking status: Never Smoker    Smokeless tobacco: Never Used   Substance Use Topics    Alcohol use: No     Medications reviewed:  Current Facility-Administered Medications   Medication Dose Route Frequency    HYDROmorphone (PF) (DILAUDID) injection 1 mg  1 mg IntraVENous Q4H PRN    diphenhydrAMINE (BENADRYL) injection 25 mg  25 mg IntraVENous Q4H PRN    dextrose 5% - 0.45% NaCl with KCl 20 mEq/L infusion  75 mL/hr IntraVENous CONTINUOUS    promethazine (PHENERGAN) injection 50 mg  50 mg IntraMUSCular Q6H PRN    sodium chloride (NS) flush 5-40 mL  5-40 mL IntraVENous Q8H    sodium chloride (NS) flush 5-40 mL  5-40 mL IntraVENous PRN    enoxaparin (LOVENOX) injection 40 mg  40 mg SubCUTAneous Q24H    citalopram (CELEXA) tablet 10 mg  10 mg Oral DAILY    fentaNYL (DURAGESIC) 75 mcg/hr patch 1 Patch  1 Patch TransDERmal [id number]    folic acid (FOLVITE) tablet 1 mg  1 mg Oral QHS    HYDROcodone-acetaminophen (NORCO)  mg tablet 1 Tab  1 Tab Oral Q6H PRN    pantoprazole (PROTONIX) tablet 40 mg  40 mg Oral ACB    ondansetron (ZOFRAN) injection 4 mg  4 mg IntraVENous Q6H PRN    losartan (COZAAR) tablet 50 mg  50 mg Oral DAILY    hydroxyurea (HYDREA) chemo cap 500 mg  500 mg Oral BID        Objective:   Vitals:  Visit Vitals  /81 (BP 1 Location: Right arm, BP Patient Position: At rest)   Pulse 76   Temp 98.8 °F (37.1 °C)   Resp 20   Ht 5' 11\" (1.803 m)   Wt 219 lb (99.3 kg)   SpO2 99%   Breastfeeding No   BMI 30.54 kg/m²     Temp (24hrs), Av.1 °F (37.3 °C), Min:98.8 °F (37.1 °C), Max:99.4 °F (37.4 °C)      O2 Device: Room air    Last 24hr Input/Output:  No intake or output data in the 24 hours ending 20 1117     PHYSICAL EXAM:  General:    Alert, cooperative, no distress, appears stated age. Head:   Normocephalic, without obvious abnormality, atraumatic. Eyes:   Conjunctivae/corneas clear. PERRLA  Nose:  Nares normal. No drainage or sinus tenderness. Throat:    Lips, mucosa, and tongue normal.  No Thrush  Neck:  Supple, symmetrical,  no adenopathy, thyroid: non tender    no carotid bruit and no JVD. Back:    Symmetric,  No CVA tenderness. Lungs:   Clear to auscultation bilaterally. No Wheezing or Rhonchi. No rales. Chest wall:  No tenderness or deformity. No Accessory muscle use. Heart:   Regular rate and rhythm,  no murmur, rub or gallop. Abdomen:   Soft, non-tender. Not distended.   Bowel sounds normal. No masses        Lab Data Reviewed:    Recent Labs     01/24/20  0406   WBC 6.9   HGB 7.9*   HCT 21.7*        No results for input(s): NA, K, CL, CO2, GLU, BUN, CREA, CA, MG, PHOS, ALB, TBIL, TBILI, SGOT, ALT, INR, INREXT in the last 72 hours. Lab Results   Component Value Date/Time    Glucose (POC) 110 (H) 01/25/2020 07:47 AM    Glucose (POC) 145 (H) 01/24/2020 09:32 PM    Glucose (POC) 119 (H) 01/24/2020 04:30 PM    Glucose (POC) 79 01/24/2020 11:47 AM    Glucose (POC) 99 01/24/2020 07:44 AM     No results for input(s): PH, PCO2, PO2, HCO3, FIO2 in the last 72 hours. No results for input(s): INR, INREXT in the last 72 hours.   ___________________________________________________  ___________________________________________________    Attending Physician: Luda Lai MD

## 2020-01-26 LAB
GLUCOSE BLD STRIP.AUTO-MCNC: 105 MG/DL (ref 65–100)
GLUCOSE BLD STRIP.AUTO-MCNC: 111 MG/DL (ref 65–100)
GLUCOSE BLD STRIP.AUTO-MCNC: 117 MG/DL (ref 65–100)
GLUCOSE BLD STRIP.AUTO-MCNC: 126 MG/DL (ref 65–100)
SERVICE CMNT-IMP: ABNORMAL

## 2020-01-26 PROCEDURE — 82962 GLUCOSE BLOOD TEST: CPT

## 2020-01-26 PROCEDURE — 74011250637 HC RX REV CODE- 250/637: Performed by: HOSPITALIST

## 2020-01-26 PROCEDURE — 74011250636 HC RX REV CODE- 250/636: Performed by: INTERNAL MEDICINE

## 2020-01-26 PROCEDURE — 74011250637 HC RX REV CODE- 250/637: Performed by: INTERNAL MEDICINE

## 2020-01-26 PROCEDURE — 65270000015 HC RM PRIVATE ONCOLOGY

## 2020-01-26 PROCEDURE — 74011250636 HC RX REV CODE- 250/636: Performed by: HOSPITALIST

## 2020-01-26 RX ORDER — POLYETHYLENE GLYCOL 3350 17 G/17G
17 POWDER, FOR SOLUTION ORAL 2 TIMES DAILY
Status: DISCONTINUED | OUTPATIENT
Start: 2020-01-26 | End: 2020-01-30 | Stop reason: HOSPADM

## 2020-01-26 RX ORDER — POLYETHYLENE GLYCOL 3350 17 G/17G
17 POWDER, FOR SOLUTION ORAL DAILY
Status: DISCONTINUED | OUTPATIENT
Start: 2020-01-26 | End: 2020-01-26

## 2020-01-26 RX ADMIN — POLYETHYLENE GLYCOL 3350 17 G: 17 POWDER, FOR SOLUTION ORAL at 17:00

## 2020-01-26 RX ADMIN — DIPHENHYDRAMINE HYDROCHLORIDE 25 MG: 50 INJECTION, SOLUTION INTRAMUSCULAR; INTRAVENOUS at 21:01

## 2020-01-26 RX ADMIN — ONDANSETRON 4 MG: 2 INJECTION INTRAMUSCULAR; INTRAVENOUS at 11:00

## 2020-01-26 RX ADMIN — ONDANSETRON 4 MG: 2 INJECTION INTRAMUSCULAR; INTRAVENOUS at 17:49

## 2020-01-26 RX ADMIN — LOSARTAN POTASSIUM 50 MG: 25 TABLET ORAL at 08:56

## 2020-01-26 RX ADMIN — DIPHENHYDRAMINE HYDROCHLORIDE 25 MG: 50 INJECTION, SOLUTION INTRAMUSCULAR; INTRAVENOUS at 17:00

## 2020-01-26 RX ADMIN — DIPHENHYDRAMINE HYDROCHLORIDE 25 MG: 50 INJECTION, SOLUTION INTRAMUSCULAR; INTRAVENOUS at 13:02

## 2020-01-26 RX ADMIN — DEXTROSE MONOHYDRATE, SODIUM CHLORIDE, AND POTASSIUM CHLORIDE 75 ML/HR: 50; 4.5; 1.49 INJECTION, SOLUTION INTRAVENOUS at 13:58

## 2020-01-26 RX ADMIN — HYDROXYUREA 500 MG: 500 CAPSULE ORAL at 17:05

## 2020-01-26 RX ADMIN — HYDROMORPHONE HYDROCHLORIDE 1 MG: 1 INJECTION, SOLUTION INTRAMUSCULAR; INTRAVENOUS; SUBCUTANEOUS at 08:56

## 2020-01-26 RX ADMIN — HYDROMORPHONE HYDROCHLORIDE 1 MG: 1 INJECTION, SOLUTION INTRAMUSCULAR; INTRAVENOUS; SUBCUTANEOUS at 20:59

## 2020-01-26 RX ADMIN — PANTOPRAZOLE SODIUM 40 MG: 40 TABLET, DELAYED RELEASE ORAL at 08:56

## 2020-01-26 RX ADMIN — Medication 10 ML: at 21:02

## 2020-01-26 RX ADMIN — Medication 10 ML: at 13:08

## 2020-01-26 RX ADMIN — DIPHENHYDRAMINE HYDROCHLORIDE 25 MG: 50 INJECTION, SOLUTION INTRAMUSCULAR; INTRAVENOUS at 01:01

## 2020-01-26 RX ADMIN — HYDROXYUREA 500 MG: 500 CAPSULE ORAL at 09:09

## 2020-01-26 RX ADMIN — DIPHENHYDRAMINE HYDROCHLORIDE 25 MG: 50 INJECTION, SOLUTION INTRAMUSCULAR; INTRAVENOUS at 04:57

## 2020-01-26 RX ADMIN — HYDROMORPHONE HYDROCHLORIDE 1 MG: 1 INJECTION, SOLUTION INTRAMUSCULAR; INTRAVENOUS; SUBCUTANEOUS at 17:00

## 2020-01-26 RX ADMIN — ENOXAPARIN SODIUM 40 MG: 40 INJECTION SUBCUTANEOUS at 11:00

## 2020-01-26 RX ADMIN — DIPHENHYDRAMINE HYDROCHLORIDE 25 MG: 50 INJECTION, SOLUTION INTRAMUSCULAR; INTRAVENOUS at 08:56

## 2020-01-26 RX ADMIN — Medication 10 ML: at 04:57

## 2020-01-26 RX ADMIN — FOLIC ACID 1 MG: 1 TABLET ORAL at 21:01

## 2020-01-26 RX ADMIN — ONDANSETRON 4 MG: 2 INJECTION INTRAMUSCULAR; INTRAVENOUS at 01:00

## 2020-01-26 RX ADMIN — ONDANSETRON 4 MG: 2 INJECTION INTRAMUSCULAR; INTRAVENOUS at 23:41

## 2020-01-26 RX ADMIN — HYDROMORPHONE HYDROCHLORIDE 1 MG: 1 INJECTION, SOLUTION INTRAMUSCULAR; INTRAVENOUS; SUBCUTANEOUS at 04:56

## 2020-01-26 RX ADMIN — HYDROMORPHONE HYDROCHLORIDE 1 MG: 1 INJECTION, SOLUTION INTRAMUSCULAR; INTRAVENOUS; SUBCUTANEOUS at 01:01

## 2020-01-26 RX ADMIN — HYDROMORPHONE HYDROCHLORIDE 1 MG: 1 INJECTION, SOLUTION INTRAMUSCULAR; INTRAVENOUS; SUBCUTANEOUS at 13:02

## 2020-01-26 RX ADMIN — POLYETHYLENE GLYCOL 3350 17 G: 17 POWDER, FOR SOLUTION ORAL at 08:56

## 2020-01-26 RX ADMIN — CITALOPRAM HYDROBROMIDE 10 MG: 20 TABLET ORAL at 08:56

## 2020-01-26 RX ADMIN — DEXTROSE MONOHYDRATE, SODIUM CHLORIDE, AND POTASSIUM CHLORIDE 75 ML/HR: 50; 4.5; 1.49 INJECTION, SOLUTION INTRAVENOUS at 01:01

## 2020-01-26 NOTE — PROGRESS NOTES
Oncology End of Shift Note      Bedside shift change report given to Teresa Alcaraz RN (incoming nurse) by Clare Fraser RN (outgoing nurse) on [facility]. Report included the following information SBAR, Kardex and MAR. Shift Summary:   No change in patient condition throughout shift. Given PRN dilaudid, benadryl, and Zofran. Administered chemo capsule. Continuous fluids running. Started on miralax. Patient tolerated diet well. Issues for Physician to Address:       Patient on Cardiac Monitoring?    [] Yes  [x] No    Rhythm:          Shift Events      Clare Fraser RN

## 2020-01-26 NOTE — PROGRESS NOTES
Bedside shift change report given to Yelitza Hannah (oncoming nurse) by Al./Júnior (offgoing nurse). Report included the following information SBAR, Kardex and MAR. Patient condition remains the same through my shift. Patient requested PRN dilaudid, Zofran, and benadryl.

## 2020-01-26 NOTE — PROGRESS NOTES
Oncology End of Shift Note      Bedside shift change report given to Vi Cho RN (incoming nurse) by Carmelina Benson (outgoing nurse) on [facility]. Report included the following information SBAR, Kardex, Intake/Output and MAR. Shift Summary: Agree with care provided and documented by Walter Student Nurse. Pt remained stable during shift. PRN Dilaudid and Benadryl x3. PRN zofran x1. No other complaints. No labs ordered. Pt would like Miralx today      Issues for Physician to Address:  Pt would like Miralax today     Patient on Cardiac Monitoring?     [] Yes  [x] No    Rhythm:          Carmelina Benson

## 2020-01-26 NOTE — PROGRESS NOTES
Problem: Falls - Risk of  Goal: *Absence of Falls  Description  Document Mary Ellen Arciniegating Fall Risk and appropriate interventions in the flowsheet.   Outcome: Progressing Towards Goal  Note: Fall Risk Interventions:  Mobility Interventions: Communicate number of staff needed for ambulation/transfer         Medication Interventions: Teach patient to arise slowly

## 2020-01-26 NOTE — PROGRESS NOTES
Hospital Progress Note    NAME:  Joan Plattsburgh   :   1960   MRN:  [id number]     Date/Time:  2020 11:17 AM    Plan:   1. Pain control  2. Encourage activity  Risk of Deterioration: Low  []           Moderate  [x]           High  []                 Assessment:   Principal Problem:    Sickle cell crisis ([facility] [address] (2017)    Active Problems:    AVN of femur ([facility] [address] (2020)      Hypokalemia (2020)      Hypertension (10/3/2014)      Acute gastroenteritis (10/27/2019)       Admitting notes:  62-year-old Afro-American female, who has previous history significant for sickle cell, chronic pain syndrome, depression, previous history of hepatitis C, presented to the emergency room at [facility] yesterday due to persistent vomiting and groin pain yesterday. The patient tells me that she was evaluated, was not given any IV fluids or pain medication and was discharged. She comes back today for further evaluation of her vomiting and abdominal pain. The patient states that she think she has food poisoning she ate from [address] yesterday, nobody else is sick at home. The patient tried to use hydrocodone and fentanyl patch at home without any significant improvement as she was not able to pain medication  down.       Subjective:     Continues c/o painful hip  11 Point Review of Systems:   Negative except no cp/sob    []            Unable to obtain ROS due to:       []            mental status change []            sedated []            intubated     Social History     Tobacco Use    Smoking status: Never Smoker    Smokeless tobacco: Never Used   Substance Use Topics    Alcohol use: No     Medications reviewed:  Current Facility-Administered Medications   Medication Dose Route Frequency    polyethylene glycol (MIRALAX) packet 17 g  17 g Oral BID    HYDROmorphone (PF) (DILAUDID) injection 1 mg  1 mg IntraVENous Q4H PRN    diphenhydrAMINE (BENADRYL) injection 25 mg  25 mg IntraVENous Q4H PRN    dextrose 5% - 0.45% NaCl with KCl 20 mEq/L infusion  75 mL/hr IntraVENous CONTINUOUS    promethazine (PHENERGAN) injection 50 mg  50 mg IntraMUSCular Q6H PRN    sodium chloride (NS) flush 5-40 mL  5-40 mL IntraVENous Q8H    sodium chloride (NS) flush 5-40 mL  5-40 mL IntraVENous PRN    enoxaparin (LOVENOX) injection 40 mg  40 mg SubCUTAneous Q24H    citalopram (CELEXA) tablet 10 mg  10 mg Oral DAILY    fentaNYL (DURAGESIC) 75 mcg/hr patch 1 Patch  1 Patch TransDERmal Z16G    folic acid (FOLVITE) tablet 1 mg  1 mg Oral QHS    HYDROcodone-acetaminophen (NORCO)  mg tablet 1 Tab  1 Tab Oral Q6H PRN    pantoprazole (PROTONIX) tablet 40 mg  40 mg Oral ACB    ondansetron (ZOFRAN) injection 4 mg  4 mg IntraVENous Q6H PRN    losartan (COZAAR) tablet 50 mg  50 mg Oral DAILY    hydroxyurea (HYDREA) chemo cap 500 mg  500 mg Oral BID        Objective:   Vitals:  Visit Vitals  /84 (BP 1 Location: Right arm, BP Patient Position: At rest)   Pulse 80   Temp 98.8 °F (37.1 °C)   Resp 18   Ht 5' 11\" (1.803 m)   Wt 219 lb (99.3 kg)   SpO2 100%   Breastfeeding No   BMI 30.54 kg/m²     Temp (24hrs), Av °F (37.2 °C), Min:98.8 °F (37.1 °C), Max:99.3 °F (37.4 °C)      O2 Device: Room air    Last 24hr Input/Output:    Intake/Output Summary (Last 24 hours) at 2020 0718  Last data filed at 2020 0302  Gross per 24 hour   Intake 6302.5 ml   Output    Net 6302.5 ml        PHYSICAL EXAM:  General:    Alert, cooperative, no distress, appears stated age. Head:   Normocephalic, without obvious abnormality, atraumatic. Eyes:   Conjunctivae/corneas clear. PERRLA  Lungs:   Clear to auscultation bilaterally. No Wheezing or Rhonchi. No rales. Heart:   Regular rate and rhythm,  no murmur, rub or gallop. Abdomen:   Soft, non-tender. Not distended.   Bowel sounds normal. No masses        Lab Data Reviewed:    Recent Labs     20  0406   WBC 6.9   HGB 7.9*   HCT 21.7*        No results for input(s): NA, K, CL, CO2, GLU, BUN, CREA, CA, MG, PHOS, ALB, TBIL, TBILI, SGOT, ALT, INR, INREXT, INREXT in the last 72 hours. Lab Results   Component Value Date/Time    Glucose (POC) 145 (H) 01/25/2020 09:01 PM    Glucose (POC) 120 (H) 01/25/2020 04:10 PM    Glucose (POC) 93 01/25/2020 11:27 AM    Glucose (POC) 110 (H) 01/25/2020 07:47 AM    Glucose (POC) 145 (H) 01/24/2020 09:32 PM     No results for input(s): PH, PCO2, PO2, HCO3, FIO2 in the last 72 hours. No results for input(s): INR, INREXT, INREXT in the last 72 hours.   ___________________________________________________  ___________________________________________________    Attending Physician: Isabel Mc MD

## 2020-01-26 NOTE — PROGRESS NOTES
Oncology End of Shift Note      Bedside shift change report given to Tang Roberts RN (incoming nurse) by Ritu Melton RN (outgoing nurse) on [facility]. Report included the following information SBAR, Kardex and MAR. Shift Summary:   No change in patient condition throughout shift. Requested PRN benadryl, dilaudid, and zofran. Tolerated diet well. Patient on Cardiac Monitoring?   [] Yes  [x] No    Rhythm:          Shift Events      Ritu Melton RN

## 2020-01-27 LAB
ANION GAP SERPL CALC-SCNC: 3 MMOL/L (ref 5–15)
BASOPHILS # BLD: 0.1 K/UL (ref 0–0.1)
BASOPHILS NFR BLD: 1 % (ref 0–1)
BUN SERPL-MCNC: 7 MG/DL (ref 6–20)
BUN/CREAT SERPL: 9 (ref 12–20)
CALCIUM SERPL-MCNC: 8.9 MG/DL (ref 8.5–10.1)
CHLORIDE SERPL-SCNC: 105 MMOL/L (ref 97–108)
CO2 SERPL-SCNC: 28 MMOL/L (ref 21–32)
CREAT SERPL-MCNC: 0.77 MG/DL (ref 0.55–1.02)
DIFFERENTIAL METHOD BLD: ABNORMAL
EOSINOPHIL # BLD: 0.2 K/UL (ref 0–0.4)
EOSINOPHIL NFR BLD: 4 % (ref 0–7)
ERYTHROCYTE [DISTWIDTH] IN BLOOD BY AUTOMATED COUNT: 15.8 % (ref 11.5–14.5)
GLUCOSE BLD STRIP.AUTO-MCNC: 101 MG/DL (ref 65–100)
GLUCOSE BLD STRIP.AUTO-MCNC: 115 MG/DL (ref 65–100)
GLUCOSE BLD STRIP.AUTO-MCNC: 126 MG/DL (ref 65–100)
GLUCOSE BLD STRIP.AUTO-MCNC: 94 MG/DL (ref 65–100)
GLUCOSE SERPL-MCNC: 94 MG/DL (ref 65–100)
HCT VFR BLD AUTO: 24.2 % (ref 35–47)
HGB BLD-MCNC: 8.4 G/DL (ref 11.5–16)
IMM GRANULOCYTES # BLD AUTO: 0 K/UL (ref 0–0.04)
IMM GRANULOCYTES NFR BLD AUTO: 0 % (ref 0–0.5)
LYMPHOCYTES # BLD: 2.8 K/UL (ref 0.8–3.5)
LYMPHOCYTES NFR BLD: 53 % (ref 12–49)
MCH RBC QN AUTO: 39.4 PG (ref 26–34)
MCHC RBC AUTO-ENTMCNC: 34.7 G/DL (ref 30–36.5)
MCV RBC AUTO: 113.6 FL (ref 80–99)
MONOCYTES # BLD: 0.9 K/UL (ref 0–1)
MONOCYTES NFR BLD: 17 % (ref 5–13)
NEUTS SEG # BLD: 1.4 K/UL (ref 1.8–8)
NEUTS SEG NFR BLD: 25 % (ref 32–75)
NRBC # BLD: 0.42 K/UL (ref 0–0.01)
NRBC BLD-RTO: 7.7 PER 100 WBC
PLATELET # BLD AUTO: 198 K/UL (ref 150–400)
PMV BLD AUTO: 11.5 FL (ref 8.9–12.9)
POTASSIUM SERPL-SCNC: 3.9 MMOL/L (ref 3.5–5.1)
RBC # BLD AUTO: 2.13 M/UL (ref 3.8–5.2)
RBC MORPH BLD: ABNORMAL
SERVICE CMNT-IMP: ABNORMAL
SERVICE CMNT-IMP: NORMAL
SODIUM SERPL-SCNC: 136 MMOL/L (ref 136–145)
WBC # BLD AUTO: 5.4 K/UL (ref 3.6–11)

## 2020-01-27 PROCEDURE — 74011250636 HC RX REV CODE- 250/636: Performed by: INTERNAL MEDICINE

## 2020-01-27 PROCEDURE — 65270000015 HC RM PRIVATE ONCOLOGY

## 2020-01-27 PROCEDURE — 74011250636 HC RX REV CODE- 250/636: Performed by: HOSPITALIST

## 2020-01-27 PROCEDURE — 85025 COMPLETE CBC W/AUTO DIFF WBC: CPT

## 2020-01-27 PROCEDURE — 82962 GLUCOSE BLOOD TEST: CPT

## 2020-01-27 PROCEDURE — 74011250637 HC RX REV CODE- 250/637: Performed by: HOSPITALIST

## 2020-01-27 PROCEDURE — 36415 COLL VENOUS BLD VENIPUNCTURE: CPT

## 2020-01-27 PROCEDURE — 74011250637 HC RX REV CODE- 250/637: Performed by: INTERNAL MEDICINE

## 2020-01-27 PROCEDURE — 80048 BASIC METABOLIC PNL TOTAL CA: CPT

## 2020-01-27 RX ORDER — PROMETHAZINE HYDROCHLORIDE 25 MG/ML
25 INJECTION, SOLUTION INTRAMUSCULAR; INTRAVENOUS
Status: DISCONTINUED | OUTPATIENT
Start: 2020-01-27 | End: 2020-01-30 | Stop reason: HOSPADM

## 2020-01-27 RX ADMIN — CITALOPRAM HYDROBROMIDE 10 MG: 20 TABLET ORAL at 08:41

## 2020-01-27 RX ADMIN — HYDROMORPHONE HYDROCHLORIDE 1 MG: 1 INJECTION, SOLUTION INTRAMUSCULAR; INTRAVENOUS; SUBCUTANEOUS at 22:06

## 2020-01-27 RX ADMIN — DIPHENHYDRAMINE HYDROCHLORIDE 25 MG: 50 INJECTION, SOLUTION INTRAMUSCULAR; INTRAVENOUS at 08:43

## 2020-01-27 RX ADMIN — ONDANSETRON 4 MG: 2 INJECTION INTRAMUSCULAR; INTRAVENOUS at 06:37

## 2020-01-27 RX ADMIN — ENOXAPARIN SODIUM 40 MG: 40 INJECTION SUBCUTANEOUS at 11:38

## 2020-01-27 RX ADMIN — DIPHENHYDRAMINE HYDROCHLORIDE 25 MG: 50 INJECTION, SOLUTION INTRAMUSCULAR; INTRAVENOUS at 01:01

## 2020-01-27 RX ADMIN — LOSARTAN POTASSIUM 50 MG: 25 TABLET ORAL at 08:40

## 2020-01-27 RX ADMIN — POLYETHYLENE GLYCOL 3350 17 G: 17 POWDER, FOR SOLUTION ORAL at 08:38

## 2020-01-27 RX ADMIN — HYDROMORPHONE HYDROCHLORIDE 1 MG: 1 INJECTION, SOLUTION INTRAMUSCULAR; INTRAVENOUS; SUBCUTANEOUS at 00:58

## 2020-01-27 RX ADMIN — HYDROXYUREA 500 MG: 500 CAPSULE ORAL at 08:38

## 2020-01-27 RX ADMIN — DIPHENHYDRAMINE HYDROCHLORIDE 25 MG: 50 INJECTION, SOLUTION INTRAMUSCULAR; INTRAVENOUS at 22:06

## 2020-01-27 RX ADMIN — POLYETHYLENE GLYCOL 3350 17 G: 17 POWDER, FOR SOLUTION ORAL at 17:49

## 2020-01-27 RX ADMIN — DIPHENHYDRAMINE HYDROCHLORIDE 25 MG: 50 INJECTION, SOLUTION INTRAMUSCULAR; INTRAVENOUS at 13:15

## 2020-01-27 RX ADMIN — HYDROMORPHONE HYDROCHLORIDE 1 MG: 1 INJECTION, SOLUTION INTRAMUSCULAR; INTRAVENOUS; SUBCUTANEOUS at 08:43

## 2020-01-27 RX ADMIN — ONDANSETRON 4 MG: 2 INJECTION INTRAMUSCULAR; INTRAVENOUS at 17:50

## 2020-01-27 RX ADMIN — FOLIC ACID 1 MG: 1 TABLET ORAL at 22:05

## 2020-01-27 RX ADMIN — Medication 10 ML: at 22:05

## 2020-01-27 RX ADMIN — DIPHENHYDRAMINE HYDROCHLORIDE 25 MG: 50 INJECTION, SOLUTION INTRAMUSCULAR; INTRAVENOUS at 17:50

## 2020-01-27 RX ADMIN — DEXTROSE MONOHYDRATE, SODIUM CHLORIDE, AND POTASSIUM CHLORIDE 75 ML/HR: 50; 4.5; 1.49 INJECTION, SOLUTION INTRAVENOUS at 05:12

## 2020-01-27 RX ADMIN — HYDROMORPHONE HYDROCHLORIDE 1 MG: 1 INJECTION, SOLUTION INTRAMUSCULAR; INTRAVENOUS; SUBCUTANEOUS at 13:15

## 2020-01-27 RX ADMIN — HYDROMORPHONE HYDROCHLORIDE 1 MG: 1 INJECTION, SOLUTION INTRAMUSCULAR; INTRAVENOUS; SUBCUTANEOUS at 05:10

## 2020-01-27 RX ADMIN — HYDROMORPHONE HYDROCHLORIDE 1 MG: 1 INJECTION, SOLUTION INTRAMUSCULAR; INTRAVENOUS; SUBCUTANEOUS at 17:50

## 2020-01-27 RX ADMIN — PANTOPRAZOLE SODIUM 40 MG: 40 TABLET, DELAYED RELEASE ORAL at 07:30

## 2020-01-27 RX ADMIN — HYDROXYUREA 500 MG: 500 CAPSULE ORAL at 17:56

## 2020-01-27 RX ADMIN — DIPHENHYDRAMINE HYDROCHLORIDE 25 MG: 50 INJECTION, SOLUTION INTRAMUSCULAR; INTRAVENOUS at 05:10

## 2020-01-27 RX ADMIN — ONDANSETRON 4 MG: 2 INJECTION INTRAMUSCULAR; INTRAVENOUS at 12:39

## 2020-01-27 RX ADMIN — Medication 10 ML: at 13:16

## 2020-01-27 NOTE — PROGRESS NOTES
Bedside and Verbal shift change report given to Janae DAWSON (oncoming nurse) by Omari Bhatt (offgoing nurse). Report included the following information SBAR, Kardex, MAR and Recent Results. Pain medication, Benadryl, and zofran given as requested. Patient awake most of shift but did sleep for short intervals later in shift.  No distress noted

## 2020-01-27 NOTE — PROGRESS NOTES
General Daily Progress Note    Admit Date: 1/20/2020    Subjective:     Patient continues to complain of pain. Current Facility-Administered Medications   Medication Dose Route Frequency    promethazine (PHENERGAN) injection 25 mg  25 mg IntraMUSCular Q6H PRN    polyethylene glycol (MIRALAX) packet 17 g  17 g Oral BID    HYDROmorphone (PF) (DILAUDID) injection 1 mg  1 mg IntraVENous Q4H PRN    diphenhydrAMINE (BENADRYL) injection 25 mg  25 mg IntraVENous Q4H PRN    dextrose 5% - 0.45% NaCl with KCl 20 mEq/L infusion  75 mL/hr IntraVENous CONTINUOUS    sodium chloride (NS) flush 5-40 mL  5-40 mL IntraVENous Q8H    sodium chloride (NS) flush 5-40 mL  5-40 mL IntraVENous PRN    enoxaparin (LOVENOX) injection 40 mg  40 mg SubCUTAneous Q24H    citalopram (CELEXA) tablet 10 mg  10 mg Oral DAILY    fentaNYL (DURAGESIC) 75 mcg/hr patch 1 Patch  1 Patch TransDERmal W41H    folic acid (FOLVITE) tablet 1 mg  1 mg Oral QHS    HYDROcodone-acetaminophen (NORCO)  mg tablet 1 Tab  1 Tab Oral Q6H PRN    pantoprazole (PROTONIX) tablet 40 mg  40 mg Oral ACB    ondansetron (ZOFRAN) injection 4 mg  4 mg IntraVENous Q6H PRN    losartan (COZAAR) tablet 50 mg  50 mg Oral DAILY    hydroxyurea (HYDREA) chemo cap 500 mg  500 mg Oral BID        Review of Systems  A comprehensive review of systems was negative. Objective:     Patient Vitals for the past 24 hrs:   BP Temp Pulse Resp SpO2   01/27/20 0728 112/73 98.8 °F (37.1 °C) 80 16 100 %   01/26/20 2309 137/85 99.2 °F (37.3 °C) 87 17 96 %   01/26/20 1940 149/82 98.9 °F (37.2 °C) 90 18 96 %   01/26/20 1540 133/71 98.5 °F (36.9 °C) 85 18 98 %     No intake/output data recorded.   01/25 1901 - 01/27 0700  In: 8347.5 [I.V.:8347.5]  Out: -     Physical Exam:   Visit Vitals  /73   Pulse 80   Temp 98.8 °F (37.1 °C)   Resp 16   Ht 5' 11\" (1.803 m)   Wt 219 lb (99.3 kg)   SpO2 100%   Breastfeeding No   BMI 30.54 kg/m²     General appearance: alert, cooperative, no distress, appears stated age  Neck: supple, symmetrical, trachea midline, no adenopathy, thyroid: not enlarged, symmetric, no tenderness/mass/nodules, no carotid bruit and no JVD  Lungs: clear to auscultation bilaterally  Heart: regular rate and rhythm, S1, S2 normal, no murmur, click, rub or gallop  Abdomen: soft, non-tender. Bowel sounds normal. No masses,  no organomegaly  Extremities: extremities normal, atraumatic, no cyanosis or edema        Data Review   Recent Results (from the past 24 hour(s))   GLUCOSE, POC    Collection Time: 01/26/20 11:22 AM   Result Value Ref Range    Glucose (POC) 111 (H) 65 - 100 mg/dL    Performed by [address], POC    Collection Time: 01/26/20  4:36 PM   Result Value Ref Range    Glucose (POC) 126 (H) 65 - 100 mg/dL    Performed by Abigail Hunter PCT    GLUCOSE, POC    Collection Time: 01/26/20  9:48 PM   Result Value Ref Range    Glucose (POC) 117 (H) 65 - 100 mg/dL    Performed by Silas Trevino    CBC WITH AUTOMATED DIFF    Collection Time: 01/27/20  1:09 AM   Result Value Ref Range    WBC 5.4 3.6 - 11.0 K/uL    RBC 2.13 (L) 3.80 - 5.20 M/uL    HGB 8.4 (L) 11.5 - 16.0 g/dL    HCT 24.2 (L) 35.0 - 47.0 %    .6 (H) 80.0 - 99.0 FL    MCH 39.4 (H) 26.0 - 34.0 PG    MCHC 34.7 30.0 - 36.5 g/dL    RDW 15.8 (H) 11.5 - 14.5 %    PLATELET 837 779 - 633 K/uL    MPV 11.5 8.9 - 12.9 FL    NRBC 7.7 (H) 0  WBC    ABSOLUTE NRBC 0.42 (H) 0.00 - 0.01 K/uL    NEUTROPHILS 25 (L) 32 - 75 %    LYMPHOCYTES 53 (H) 12 - 49 %    MONOCYTES 17 (H) 5 - 13 %    EOSINOPHILS 4 0 - 7 %    BASOPHILS 1 0 - 1 %    IMMATURE GRANULOCYTES 0 0.0 - 0.5 %    ABS. NEUTROPHILS 1.4 (L) 1.8 - 8.0 K/UL    ABS. LYMPHOCYTES 2.8 0.8 - 3.5 K/UL    ABS. MONOCYTES 0.9 0.0 - 1.0 K/UL    ABS. EOSINOPHILS 0.2 0.0 - 0.4 K/UL    ABS. BASOPHILS 0.1 0.0 - 0.1 K/UL    ABS. IMM.  GRANS. 0.0 0.00 - 0.04 K/UL    DF SMEAR SCANNED      RBC COMMENTS TARGET CELLS  1+        RBC COMMENTS ANISOCYTOSIS  1+        RBC COMMENTS MACROCYTOSIS  1+        RBC COMMENTS OVALOCYTES  1+       METABOLIC PANEL, BASIC    Collection Time: 01/27/20  1:09 AM   Result Value Ref Range    Sodium 136 136 - 145 mmol/L    Potassium 3.9 3.5 - 5.1 mmol/L    Chloride 105 97 - 108 mmol/L    CO2 28 21 - 32 mmol/L    Anion gap 3 (L) 5 - 15 mmol/L    Glucose 94 65 - 100 mg/dL    BUN 7 6 - 20 MG/DL    Creatinine 0.77 0.55 - 1.02 MG/DL    BUN/Creatinine ratio 9 (L) 12 - 20      GFR est AA >60 >60 ml/min/1.73m2    GFR est non-AA >60 >60 ml/min/1.73m2    Calcium 8.9 8.5 - 10.1 MG/DL   GLUCOSE, POC    Collection Time: 01/27/20  7:33 AM   Result Value Ref Range    Glucose (POC) 94 65 - 100 mg/dL    Performed by Honorio Muñoz            Assessment:     Principal Problem:    Sickle cell crisis ([facility] [address] (5/22/2017)    Active Problems:    AVN of femur ([facility] [address] (1/25/2020)      Hypokalemia (1/25/2020)      Hypertension (10/3/2014)      Acute gastroenteritis (10/27/2019)        Plan:     1. Continue treatment of painful crises. Improvement is slow. 2.  Hydration continues. 3.  Complete resolution of GI diathesis.

## 2020-01-27 NOTE — PROGRESS NOTES
Problem: Falls - Risk of  Goal: *Absence of Falls  Description  Document Redgie Curet Fall Risk and appropriate interventions in the flowsheet.   Outcome: Progressing Towards Goal  Note: Fall Risk Interventions:  Mobility Interventions: Patient to call before getting OOB         Medication Interventions: Teach patient to arise slowly

## 2020-01-27 NOTE — PROGRESS NOTES
KRISTINA:   1) Home with family assistance   2) Home with f.u appts     4:00 PM- CM spoke with attending regarding d/c planning- attending unsure of when pt will be discharge, hopefull for later this week. CM will continue to follow and remain available for support.      Edith James, MSNELSON, [address]   [phone]

## 2020-01-27 NOTE — PROGRESS NOTES
Oncology End of Shift Note      Bedside shift change report given to SAMIR reed (incoming nurse) by Martha Levi (outgoing nurse) on [facility]. Report included the following information SBAR, Kardex, ED Summary, Intake/Output, MAR and Recent Results. Shift Summary:   No change in patient condition throughout shift. Patient complained of pain and nausea throughout shift, prn medication given. Patient complained of itching prn medication given. Patient informed of dilaudid allergy, patient still requested it as pain medication. maintenance fluids running. Tolerating diet well. Issues for Physician to Address:  none     Patient on Cardiac Monitoring?    [] Yes  [x] No    Rhythm:          Shift Events        Janae Glass

## 2020-01-27 NOTE — INTERDISCIPLINARY ROUNDS
Oncology Interdisciplinary rounds were held today to discuss patient plan of care and outcomes. The following members were present: Nursing, Physician, Case Management, Pharmacy, and PT/OT Actual Length of Stay: 7 DRG GLOS: 2.6 Expected Length of Stay: 2d 14h Plan            Discharge Pain management TBA

## 2020-01-28 LAB
GLUCOSE BLD STRIP.AUTO-MCNC: 110 MG/DL (ref 65–100)
GLUCOSE BLD STRIP.AUTO-MCNC: 112 MG/DL (ref 65–100)
GLUCOSE BLD STRIP.AUTO-MCNC: 114 MG/DL (ref 65–100)
GLUCOSE BLD STRIP.AUTO-MCNC: 115 MG/DL (ref 65–100)
GLUCOSE BLD STRIP.AUTO-MCNC: 155 MG/DL (ref 65–100)
SERVICE CMNT-IMP: ABNORMAL

## 2020-01-28 PROCEDURE — 74011250637 HC RX REV CODE- 250/637: Performed by: INTERNAL MEDICINE

## 2020-01-28 PROCEDURE — 65270000015 HC RM PRIVATE ONCOLOGY

## 2020-01-28 PROCEDURE — 74011250636 HC RX REV CODE- 250/636: Performed by: HOSPITALIST

## 2020-01-28 PROCEDURE — 74011250637 HC RX REV CODE- 250/637: Performed by: HOSPITALIST

## 2020-01-28 PROCEDURE — 74011250636 HC RX REV CODE- 250/636: Performed by: INTERNAL MEDICINE

## 2020-01-28 PROCEDURE — 82962 GLUCOSE BLOOD TEST: CPT

## 2020-01-28 RX ORDER — FENTANYL 100 UG/H
1 PATCH TRANSDERMAL
Status: DISCONTINUED | OUTPATIENT
Start: 2020-01-28 | End: 2020-01-30 | Stop reason: HOSPADM

## 2020-01-28 RX ADMIN — ENOXAPARIN SODIUM 40 MG: 40 INJECTION SUBCUTANEOUS at 11:28

## 2020-01-28 RX ADMIN — FOLIC ACID 1 MG: 1 TABLET ORAL at 21:58

## 2020-01-28 RX ADMIN — DIPHENHYDRAMINE HYDROCHLORIDE 25 MG: 50 INJECTION, SOLUTION INTRAMUSCULAR; INTRAVENOUS at 13:21

## 2020-01-28 RX ADMIN — HYDROMORPHONE HYDROCHLORIDE 1 MG: 1 INJECTION, SOLUTION INTRAMUSCULAR; INTRAVENOUS; SUBCUTANEOUS at 09:34

## 2020-01-28 RX ADMIN — HYDROXYUREA 500 MG: 500 CAPSULE ORAL at 18:28

## 2020-01-28 RX ADMIN — PANTOPRAZOLE SODIUM 40 MG: 40 TABLET, DELAYED RELEASE ORAL at 09:29

## 2020-01-28 RX ADMIN — CITALOPRAM HYDROBROMIDE 10 MG: 20 TABLET ORAL at 09:29

## 2020-01-28 RX ADMIN — DIPHENHYDRAMINE HYDROCHLORIDE 25 MG: 50 INJECTION, SOLUTION INTRAMUSCULAR; INTRAVENOUS at 09:34

## 2020-01-28 RX ADMIN — POLYETHYLENE GLYCOL 3350 17 G: 17 POWDER, FOR SOLUTION ORAL at 09:29

## 2020-01-28 RX ADMIN — DEXTROSE MONOHYDRATE, SODIUM CHLORIDE, AND POTASSIUM CHLORIDE 75 ML/HR: 50; 4.5; 1.49 INJECTION, SOLUTION INTRAVENOUS at 06:11

## 2020-01-28 RX ADMIN — HYDROMORPHONE HYDROCHLORIDE 1 MG: 1 INJECTION, SOLUTION INTRAMUSCULAR; INTRAVENOUS; SUBCUTANEOUS at 13:21

## 2020-01-28 RX ADMIN — Medication 10 ML: at 06:11

## 2020-01-28 RX ADMIN — DIPHENHYDRAMINE HYDROCHLORIDE 25 MG: 50 INJECTION, SOLUTION INTRAMUSCULAR; INTRAVENOUS at 06:12

## 2020-01-28 RX ADMIN — DIPHENHYDRAMINE HYDROCHLORIDE 25 MG: 50 INJECTION, SOLUTION INTRAMUSCULAR; INTRAVENOUS at 02:04

## 2020-01-28 RX ADMIN — DIPHENHYDRAMINE HYDROCHLORIDE 25 MG: 50 INJECTION, SOLUTION INTRAMUSCULAR; INTRAVENOUS at 17:59

## 2020-01-28 RX ADMIN — HYDROMORPHONE HYDROCHLORIDE 1 MG: 1 INJECTION, SOLUTION INTRAMUSCULAR; INTRAVENOUS; SUBCUTANEOUS at 06:12

## 2020-01-28 RX ADMIN — HYDROMORPHONE HYDROCHLORIDE 1 MG: 1 INJECTION, SOLUTION INTRAMUSCULAR; INTRAVENOUS; SUBCUTANEOUS at 21:58

## 2020-01-28 RX ADMIN — DEXTROSE MONOHYDRATE, SODIUM CHLORIDE, AND POTASSIUM CHLORIDE 75 ML/HR: 50; 4.5; 1.49 INJECTION, SOLUTION INTRAVENOUS at 19:23

## 2020-01-28 RX ADMIN — HYDROMORPHONE HYDROCHLORIDE 1 MG: 1 INJECTION, SOLUTION INTRAMUSCULAR; INTRAVENOUS; SUBCUTANEOUS at 02:03

## 2020-01-28 RX ADMIN — Medication 10 ML: at 13:24

## 2020-01-28 RX ADMIN — ONDANSETRON 4 MG: 2 INJECTION INTRAMUSCULAR; INTRAVENOUS at 12:53

## 2020-01-28 RX ADMIN — HYDROMORPHONE HYDROCHLORIDE 1 MG: 1 INJECTION, SOLUTION INTRAMUSCULAR; INTRAVENOUS; SUBCUTANEOUS at 17:59

## 2020-01-28 RX ADMIN — ONDANSETRON 4 MG: 2 INJECTION INTRAMUSCULAR; INTRAVENOUS at 06:50

## 2020-01-28 RX ADMIN — HYDROXYUREA 500 MG: 500 CAPSULE ORAL at 09:32

## 2020-01-28 RX ADMIN — LOSARTAN POTASSIUM 50 MG: 25 TABLET ORAL at 09:29

## 2020-01-28 RX ADMIN — POLYETHYLENE GLYCOL 3350 17 G: 17 POWDER, FOR SOLUTION ORAL at 17:59

## 2020-01-28 RX ADMIN — DIPHENHYDRAMINE HYDROCHLORIDE 25 MG: 50 INJECTION, SOLUTION INTRAMUSCULAR; INTRAVENOUS at 21:58

## 2020-01-28 RX ADMIN — ONDANSETRON 4 MG: 2 INJECTION INTRAMUSCULAR; INTRAVENOUS at 19:19

## 2020-01-28 RX ADMIN — ONDANSETRON 4 MG: 2 INJECTION INTRAMUSCULAR; INTRAVENOUS at 00:31

## 2020-01-28 NOTE — PROGRESS NOTES
General Daily Progress Note    Admit Date: 1/20/2020    Subjective:     Patient continues to complain of pain. Current Facility-Administered Medications   Medication Dose Route Frequency    fentaNYL (DURAGESIC) 100 mcg/hr patch 1 Patch  1 Patch TransDERmal Q72H    promethazine (PHENERGAN) injection 25 mg  25 mg IntraMUSCular Q6H PRN    polyethylene glycol (MIRALAX) packet 17 g  17 g Oral BID    HYDROmorphone (PF) (DILAUDID) injection 1 mg  1 mg IntraVENous Q4H PRN    diphenhydrAMINE (BENADRYL) injection 25 mg  25 mg IntraVENous Q4H PRN    dextrose 5% - 0.45% NaCl with KCl 20 mEq/L infusion  75 mL/hr IntraVENous CONTINUOUS    sodium chloride (NS) flush 5-40 mL  5-40 mL IntraVENous Q8H    sodium chloride (NS) flush 5-40 mL  5-40 mL IntraVENous PRN    enoxaparin (LOVENOX) injection 40 mg  40 mg SubCUTAneous Q24H    citalopram (CELEXA) tablet 10 mg  10 mg Oral DAILY    folic acid (FOLVITE) tablet 1 mg  1 mg Oral QHS    HYDROcodone-acetaminophen (NORCO)  mg tablet 1 Tab  1 Tab Oral Q6H PRN    pantoprazole (PROTONIX) tablet 40 mg  40 mg Oral ACB    ondansetron (ZOFRAN) injection 4 mg  4 mg IntraVENous Q6H PRN    losartan (COZAAR) tablet 50 mg  50 mg Oral DAILY    hydroxyurea (HYDREA) chemo cap 500 mg  500 mg Oral BID        Review of Systems  A comprehensive review of systems was negative. Objective:     Patient Vitals for the past 24 hrs:   BP Temp Pulse Resp SpO2   01/28/20 0800 121/76 98.9 °F (37.2 °C) 82 18 95 %   01/27/20 2335 122/86 99.4 °F (37.4 °C) 86 18 98 %   01/27/20 1945 124/77 98.8 °F (37.1 °C) 88 16 97 %   01/27/20 1527 111/67 98.9 °F (37.2 °C) 84 18 97 %     No intake/output data recorded.   01/26 1901 - 01/28 0700  In: 2045 [I.V.:2045]  Out: -     Physical Exam:   Visit Vitals  /76 (BP 1 Location: Right arm, BP Patient Position: At rest)   Pulse 82   Temp 98.9 °F (37.2 °C)   Resp 18   Ht 5' 11\" (1.803 m)   Wt 219 lb (99.3 kg)   SpO2 95%   Breastfeeding No   BMI 30.54 kg/m² General appearance: alert, cooperative, no distress, appears stated age  Neck: supple, symmetrical, trachea midline, no adenopathy, thyroid: not enlarged, symmetric, no tenderness/mass/nodules, no carotid bruit and no JVD  Lungs: clear to auscultation bilaterally  Heart: regular rate and rhythm, S1, S2 normal, no murmur, click, rub or gallop  Abdomen: soft, non-tender. Bowel sounds normal. No masses,  no organomegaly  Extremities: extremities normal, atraumatic, no cyanosis or edema        Data Review   Recent Results (from the past 24 hour(s))   GLUCOSE, POC    Collection Time: 01/27/20 11:38 AM   Result Value Ref Range    Glucose (POC) 115 (H) 65 - 100 mg/dL    Performed by Honorio Muñoz    GLUCOSE, POC    Collection Time: 01/27/20  5:08 PM   Result Value Ref Range    Glucose (POC) 101 (H) 65 - 100 mg/dL    Performed by Hardik OCAMPO    GLUCOSE, POC    Collection Time: 01/27/20  9:42 PM   Result Value Ref Range    Glucose (POC) 126 (H) 65 - 100 mg/dL    Performed by Colt Mccann    GLUCOSE, POC    Collection Time: 01/28/20  8:01 AM   Result Value Ref Range    Glucose (POC) 155 (H) 65 - 100 mg/dL    Performed by Wilmer Morillo (RN)            Assessment:     Principal Problem:    Sickle cell crisis ([facility] [address] (5/22/2017)    Active Problems:    AVN of femur ([facility] [address] (1/25/2020)      Hypokalemia (1/25/2020)      Hypertension (10/3/2014)      Acute gastroenteritis (10/27/2019)        Plan:     1. Pain control continues to be an issue. Will increase fentanyl patch and observe. 2.  Hydration adequate. 3.  Will mobilize.

## 2020-01-28 NOTE — PROGRESS NOTES
Initial Nutrition Assessment:    INTERVENTIONS/RECOMMENDATIONS:   · Continue regular diet  · Consider a more aggressive bowel regimen in last document BM of 1/20 is accurate. · Please document % meals consumed in flowsheet I/O's under intake    ASSESSMENT:   Chart reviewed, medically noted for sickle cell crisis and PMH shown below. Assessing pt due to LOS. Pt was resting during visit but woke up to report a good appetite and eating well. Noted for last documented BM was 1/20, current receiving miralax. Past Medical History:   Diagnosis Date    Anemia     SC hemoglobinopathy    Chronic pain     Depression     Hypertension     Liver disease     hepatitis C; pt reports \"cured\"    Sickle cell disease ([facility] [address]        Diet Order: Regular  % Eaten:  No data found.   Pertinent Medications: [x]Reviewed: D5 1/2 NS w/ KCl, folic acid, PPI, miralax,   Pertinent Labs: [x]Reviewed:   Food Allergies: [x]NKFA  []Other   Last BM: 1/20  Edema:   n/a     []RUE   []LUE   []RLE   []LLE      Pressure Injury:  n/a    [] Stage I   [] Stage II   [] Stage III   [] Stage IV      Wt Readings from Last 30 Encounters:   01/20/20 99.3 kg (219 lb)   01/19/20 99.3 kg (219 lb)   12/12/19 95.8 kg (211 lb 3.2 oz)   11/05/19 98.4 kg (216 lb 14.4 oz)   10/31/19 97.3 kg (214 lb 9.6 oz)   10/21/19 99.3 kg (218 lb 14.4 oz)   10/03/19 93.5 kg (206 lb 2.1 oz)   09/26/19 93.8 kg (206 lb 11.2 oz)   09/06/19 92 kg (202 lb 13.2 oz)   08/12/19 95 kg (209 lb 6.4 oz)   07/27/19 89.4 kg (197 lb 1.5 oz)   07/26/19 90.6 kg (199 lb 12.8 oz)   06/25/19 89.8 kg (197 lb 14.4 oz)   06/14/19 89.3 kg (196 lb 14.4 oz)   05/26/19 88 kg (194 lb 0.1 oz)   05/25/19 88 kg (194 lb 0.1 oz)   05/09/19 87.6 kg (193 lb 3.2 oz)   04/14/19 86.1 kg (189 lb 13.1 oz)   04/04/19 88.5 kg (195 lb 3.2 oz)   03/07/19 89.4 kg (197 lb 3.2 oz)   02/16/19 80.6 kg (177 lb 11.1 oz)   02/07/19 82.2 kg (181 lb 4.8 oz)   01/19/19 79.8 kg (175 lb 14.8 oz)   01/10/19 82.9 kg (182 lb 12.8 oz) 12/12/18 77 kg (169 lb 12.1 oz)   11/13/18 89.4 kg (197 lb 1.6 oz)   10/29/18 77.4 kg (170 lb 10.2 oz)   10/02/18 91.5 kg (201 lb 12.8 oz)   09/24/18 86.3 kg (190 lb 3.2 oz)   09/07/18 83.9 kg (185 lb)       Anthropometrics:   Height: 5' 11\" (180.3 cm) Weight: 99.3 kg (219 lb)   IBW (%IBW):   ( ) UBW (%UBW):   (  %)   Last Weight Metrics:  Weight Loss Metrics 1/20/2020 1/19/2020 12/12/2019 11/5/2019 10/31/2019 10/21/2019 10/3/2019   Today's Wt 219 lb 219 lb 211 lb 3.2 oz 216 lb 14.4 oz 214 lb 9.6 oz 218 lb 14.4 oz 206 lb 2.1 oz   BMI 30.54 kg/m2 30.54 kg/m2 29.46 kg/m2 30.25 kg/m2 29.93 kg/m2 30.53 kg/m2 28.75 kg/m2       BMI: Body mass index is 30.54 kg/m². This BMI is indicative of:   []Underweight    []Normal    []Overweight    [x] Obesity   [] Extreme Obesity (BMI>40)     Estimated Nutrition Needs (Based on):   1850 Kcals/day(BMR: 1675 x 1.1) , 80 g(0.8 g/kg) Protein  Carbohydrate: At Least 130 g/day  Fluids: 1850 mL/day (1ml/kcal) or per primary team    NUTRITION DIAGNOSES:   Problem:  No nutritional diagnosis at this time      Etiology: related to       Signs/Symptoms: as evidenced by        NUTRITION INTERVENTIONS:  Meals/Snacks: General/healthful diet                  GOAL:   consume >50% of meals in 5-7 days    LEARNING NEEDS (Diet, Food/Nutrient-Drug Interaction):    [x] None Identified   [] Identified and Education Provided/Documented   [] Identified and Pt declined/was not appropriate     Cultureal, Restorationist, OR Ethnic Dietary Needs:    [x] None Identified   [] Identified and Addressed     [x] Interdisciplinary Care Plan Reviewed/Documented    [x] Discharge Planning:  General healthy diet      MONITORING /EVALUATION:      Food/Nutrient Intake Outcomes:  Total energy intake  Physical Signs/Symptoms Outcomes: Weight/weight change, Electrolyte and renal profile, Glucose profile, GI    NUTRITION RISK:    [] High              [] Moderate           [x]  Low  []  Minimal/Uncompromised    PT SEEN FOR:    [] MD Consult: []Calorie Count      []Diabetic Diet Education        []Diet Education     []Electrolyte Management     []General Nutrition Management and Supplements     []Management of Tube Feeding     []TPN Recommendations    []  RN Referral:  []MST score >=2     []Enteral/Parenteral Nutrition PTA     []Pregnant: Gestational DM or Multigestation     []Pressure Ulcer/Wound Care needs        []  Low BMI  [x]  LOS Referral       Rick Velez RDN  Pager [phone]  Weekend Pager [phone]

## 2020-01-28 NOTE — INTERDISCIPLINARY ROUNDS
Oncology Interdisciplinary rounds were held today to discuss patient plan of care and outcomes. The following members were present: Nursing, Physician, Case Management, Pharmacy, and PT/OT Actual Length of Stay: 8 DRG GLOS: 2.6 Expected Length of Stay: 2d 14h Plan            Discharge Pain control Discharge 1/29/2020

## 2020-01-28 NOTE — PROGRESS NOTES
Problem: Falls - Risk of  Goal: *Absence of Falls  Description  Document Kevin Soria Fall Risk and appropriate interventions in the flowsheet.   Outcome: Progressing Towards Goal  Note: Fall Risk Interventions:  Mobility Interventions: Patient to call before getting OOB         Medication Interventions: Teach patient to arise slowly

## 2020-01-29 LAB
ANION GAP SERPL CALC-SCNC: 4 MMOL/L (ref 5–15)
BASOPHILS # BLD: 0 K/UL (ref 0–0.1)
BASOPHILS NFR BLD: 0 % (ref 0–1)
BLASTS NFR BLD MANUAL: 0 %
BUN SERPL-MCNC: 7 MG/DL (ref 6–20)
BUN/CREAT SERPL: 10 (ref 12–20)
CALCIUM SERPL-MCNC: 8.4 MG/DL (ref 8.5–10.1)
CHLORIDE SERPL-SCNC: 105 MMOL/L (ref 97–108)
CO2 SERPL-SCNC: 27 MMOL/L (ref 21–32)
CREAT SERPL-MCNC: 0.72 MG/DL (ref 0.55–1.02)
DIFFERENTIAL METHOD BLD: ABNORMAL
EOSINOPHIL # BLD: 0 K/UL (ref 0–0.4)
EOSINOPHIL NFR BLD: 0 % (ref 0–7)
ERYTHROCYTE [DISTWIDTH] IN BLOOD BY AUTOMATED COUNT: 14.8 % (ref 11.5–14.5)
GLUCOSE BLD STRIP.AUTO-MCNC: 122 MG/DL (ref 65–100)
GLUCOSE BLD STRIP.AUTO-MCNC: 131 MG/DL (ref 65–100)
GLUCOSE BLD STRIP.AUTO-MCNC: 131 MG/DL (ref 65–100)
GLUCOSE BLD STRIP.AUTO-MCNC: 137 MG/DL (ref 65–100)
GLUCOSE SERPL-MCNC: 116 MG/DL (ref 65–100)
HCT VFR BLD AUTO: 22.8 % (ref 35–47)
HGB BLD-MCNC: 8.3 G/DL (ref 11.5–16)
IMM GRANULOCYTES # BLD AUTO: 0 K/UL
IMM GRANULOCYTES NFR BLD AUTO: 0 %
LYMPHOCYTES # BLD: 3.3 K/UL (ref 0.8–3.5)
LYMPHOCYTES NFR BLD: 53 % (ref 12–49)
MCH RBC QN AUTO: 39.3 PG (ref 26–34)
MCHC RBC AUTO-ENTMCNC: 36.4 G/DL (ref 30–36.5)
MCV RBC AUTO: 108.1 FL (ref 80–99)
METAMYELOCYTES NFR BLD MANUAL: 0 %
MONOCYTES # BLD: 0.7 K/UL (ref 0–1)
MONOCYTES NFR BLD: 12 % (ref 5–13)
MYELOCYTES NFR BLD MANUAL: 0 %
NEUTS BAND NFR BLD MANUAL: 0 % (ref 0–6)
NEUTS SEG # BLD: 2.1 K/UL (ref 1.8–8)
NEUTS SEG NFR BLD: 35 % (ref 32–75)
NRBC # BLD: 0.26 K/UL (ref 0–0.01)
NRBC BLD-RTO: 4.3 PER 100 WBC
OTHER CELLS NFR BLD MANUAL: 0 %
PLATELET # BLD AUTO: 204 K/UL (ref 150–400)
PMV BLD AUTO: 11.9 FL (ref 8.9–12.9)
POTASSIUM SERPL-SCNC: 3.9 MMOL/L (ref 3.5–5.1)
PROMYELOCYTES NFR BLD MANUAL: 0 %
RBC # BLD AUTO: 2.11 M/UL (ref 3.8–5.2)
RBC MORPH BLD: ABNORMAL
SERVICE CMNT-IMP: ABNORMAL
SODIUM SERPL-SCNC: 136 MMOL/L (ref 136–145)
WBC # BLD AUTO: 6.1 K/UL (ref 3.6–11)

## 2020-01-29 PROCEDURE — 74011250637 HC RX REV CODE- 250/637: Performed by: HOSPITALIST

## 2020-01-29 PROCEDURE — 74011250636 HC RX REV CODE- 250/636: Performed by: HOSPITALIST

## 2020-01-29 PROCEDURE — 74011250636 HC RX REV CODE- 250/636: Performed by: INTERNAL MEDICINE

## 2020-01-29 PROCEDURE — 36415 COLL VENOUS BLD VENIPUNCTURE: CPT

## 2020-01-29 PROCEDURE — 82962 GLUCOSE BLOOD TEST: CPT

## 2020-01-29 PROCEDURE — 74011250637 HC RX REV CODE- 250/637: Performed by: INTERNAL MEDICINE

## 2020-01-29 PROCEDURE — 80048 BASIC METABOLIC PNL TOTAL CA: CPT

## 2020-01-29 PROCEDURE — 85027 COMPLETE CBC AUTOMATED: CPT

## 2020-01-29 PROCEDURE — 65270000015 HC RM PRIVATE ONCOLOGY

## 2020-01-29 RX ORDER — HYDROMORPHONE HYDROCHLORIDE 1 MG/ML
1 INJECTION, SOLUTION INTRAMUSCULAR; INTRAVENOUS; SUBCUTANEOUS
Status: COMPLETED | OUTPATIENT
Start: 2020-01-29 | End: 2020-01-29

## 2020-01-29 RX ADMIN — POLYETHYLENE GLYCOL 3350 17 G: 17 POWDER, FOR SOLUTION ORAL at 09:39

## 2020-01-29 RX ADMIN — Medication 10 ML: at 17:51

## 2020-01-29 RX ADMIN — CITALOPRAM HYDROBROMIDE 10 MG: 20 TABLET ORAL at 09:39

## 2020-01-29 RX ADMIN — Medication 10 ML: at 06:23

## 2020-01-29 RX ADMIN — DIPHENHYDRAMINE HYDROCHLORIDE 25 MG: 50 INJECTION, SOLUTION INTRAMUSCULAR; INTRAVENOUS at 02:20

## 2020-01-29 RX ADMIN — ONDANSETRON 4 MG: 2 INJECTION INTRAMUSCULAR; INTRAVENOUS at 17:50

## 2020-01-29 RX ADMIN — ONDANSETRON 4 MG: 2 INJECTION INTRAMUSCULAR; INTRAVENOUS at 03:25

## 2020-01-29 RX ADMIN — LOSARTAN POTASSIUM 50 MG: 25 TABLET ORAL at 09:38

## 2020-01-29 RX ADMIN — HYDROMORPHONE HYDROCHLORIDE 1 MG: 1 INJECTION, SOLUTION INTRAMUSCULAR; INTRAVENOUS; SUBCUTANEOUS at 06:23

## 2020-01-29 RX ADMIN — PANTOPRAZOLE SODIUM 40 MG: 40 TABLET, DELAYED RELEASE ORAL at 09:39

## 2020-01-29 RX ADMIN — HYDROCODONE BITARTRATE AND ACETAMINOPHEN 1 TABLET: 10; 325 TABLET ORAL at 09:38

## 2020-01-29 RX ADMIN — HYDROXYUREA 500 MG: 500 CAPSULE ORAL at 17:55

## 2020-01-29 RX ADMIN — DIPHENHYDRAMINE HYDROCHLORIDE 25 MG: 50 INJECTION, SOLUTION INTRAMUSCULAR; INTRAVENOUS at 14:46

## 2020-01-29 RX ADMIN — FOLIC ACID 1 MG: 1 TABLET ORAL at 22:08

## 2020-01-29 RX ADMIN — HYDROMORPHONE HYDROCHLORIDE 1 MG: 1 INJECTION, SOLUTION INTRAMUSCULAR; INTRAVENOUS; SUBCUTANEOUS at 14:46

## 2020-01-29 RX ADMIN — HYDROMORPHONE HYDROCHLORIDE 1 MG: 1 INJECTION, SOLUTION INTRAMUSCULAR; INTRAVENOUS; SUBCUTANEOUS at 22:56

## 2020-01-29 RX ADMIN — Medication 10 ML: at 22:09

## 2020-01-29 RX ADMIN — ENOXAPARIN SODIUM 40 MG: 40 INJECTION SUBCUTANEOUS at 11:06

## 2020-01-29 RX ADMIN — DIPHENHYDRAMINE HYDROCHLORIDE 25 MG: 50 INJECTION, SOLUTION INTRAMUSCULAR; INTRAVENOUS at 06:23

## 2020-01-29 RX ADMIN — DIPHENHYDRAMINE HYDROCHLORIDE 25 MG: 50 INJECTION, SOLUTION INTRAMUSCULAR; INTRAVENOUS at 09:48

## 2020-01-29 RX ADMIN — DIPHENHYDRAMINE HYDROCHLORIDE 25 MG: 50 INJECTION, SOLUTION INTRAMUSCULAR; INTRAVENOUS at 22:56

## 2020-01-29 RX ADMIN — POLYETHYLENE GLYCOL 3350 17 G: 17 POWDER, FOR SOLUTION ORAL at 17:50

## 2020-01-29 RX ADMIN — HYDROXYUREA 500 MG: 500 CAPSULE ORAL at 09:53

## 2020-01-29 RX ADMIN — ONDANSETRON 4 MG: 2 INJECTION INTRAMUSCULAR; INTRAVENOUS at 11:15

## 2020-01-29 RX ADMIN — HYDROMORPHONE HYDROCHLORIDE 1 MG: 1 INJECTION, SOLUTION INTRAMUSCULAR; INTRAVENOUS; SUBCUTANEOUS at 02:20

## 2020-01-29 NOTE — PROGRESS NOTES
Problem: Falls - Risk of  Goal: *Absence of Falls  Outcome: Progressing Towards Goal  Note: Fall Risk Interventions:  Mobility Interventions: Communicate number of staff needed for ambulation/transfer         Medication Interventions: Evaluate medications/consider consulting pharmacy         History of Falls Interventions: Evaluate medications/consider consulting pharmacy         Problem: Patient Education: Go to Patient Education Activity  Goal: Patient/Family Education  Outcome: Progressing Towards Goal     Problem: Falls - Risk of  Goal: *Absence of Falls  Outcome: Progressing Towards Goal  Note: Fall Risk Interventions:  Mobility Interventions: Communicate number of staff needed for ambulation/transfer         Medication Interventions: Evaluate medications/consider consulting pharmacy         History of Falls Interventions: Evaluate medications/consider consulting pharmacy

## 2020-01-29 NOTE — INTERDISCIPLINARY ROUNDS
Oncology Interdisciplinary rounds were held today to discuss patient plan of care and outcomes. The following members were present: Nursing, Physician, Case Management, Pharmacy, and PT/OT Actual Length of Stay: 9 DRG GLOS: 2.6 Expected Length of Stay: 2d 14h Plan            Discharge Pain management. Possible discharge 1/30/2020.

## 2020-01-29 NOTE — PROGRESS NOTES
General Daily Progress Note    Admit Date: 1/20/2020    Subjective:     Patient feels better with decreasing pain. Current Facility-Administered Medications   Medication Dose Route Frequency    HYDROmorphone (PF) (DILAUDID) injection 1 mg  1 mg IntraVENous Q8H PRN    fentaNYL (DURAGESIC) 100 mcg/hr patch 1 Patch  1 Patch TransDERmal Q72H    promethazine (PHENERGAN) injection 25 mg  25 mg IntraMUSCular Q6H PRN    polyethylene glycol (MIRALAX) packet 17 g  17 g Oral BID    diphenhydrAMINE (BENADRYL) injection 25 mg  25 mg IntraVENous Q4H PRN    dextrose 5% - 0.45% NaCl with KCl 20 mEq/L infusion  50 mL/hr IntraVENous CONTINUOUS    sodium chloride (NS) flush 5-40 mL  5-40 mL IntraVENous Q8H    sodium chloride (NS) flush 5-40 mL  5-40 mL IntraVENous PRN    enoxaparin (LOVENOX) injection 40 mg  40 mg SubCUTAneous Q24H    citalopram (CELEXA) tablet 10 mg  10 mg Oral DAILY    folic acid (FOLVITE) tablet 1 mg  1 mg Oral QHS    HYDROcodone-acetaminophen (NORCO)  mg tablet 1 Tab  1 Tab Oral Q6H PRN    pantoprazole (PROTONIX) tablet 40 mg  40 mg Oral ACB    ondansetron (ZOFRAN) injection 4 mg  4 mg IntraVENous Q6H PRN    losartan (COZAAR) tablet 50 mg  50 mg Oral DAILY    hydroxyurea (HYDREA) chemo cap 500 mg  500 mg Oral BID        Review of Systems  A comprehensive review of systems was negative. Objective:     Patient Vitals for the past 24 hrs:   BP Temp Pulse Resp SpO2   01/29/20 0800 110/67 99.3 °F (37.4 °C) 84 18 93 %   01/29/20 0219 127/62 98.9 °F (37.2 °C) 85 18 98 %   01/28/20 2100 123/76 99.1 °F (37.3 °C) 86 18 96 %   01/28/20 1525 131/76 98.8 °F (37.1 °C) 85 18 97 %     No intake/output data recorded. No intake/output data recorded.     Physical Exam:   Visit Vitals  /67 (BP 1 Location: Right arm, BP Patient Position: Sitting)   Pulse 84   Temp 99.3 °F (37.4 °C)   Resp 18   Ht 5' 11\" (1.803 m)   Wt 219 lb (99.3 kg)   SpO2 93%   Breastfeeding No   BMI 30.54 kg/m²     General appearance: alert, cooperative, no distress, appears stated age  Neck: supple, symmetrical, trachea midline, no adenopathy, thyroid: not enlarged, symmetric, no tenderness/mass/nodules, no carotid bruit and no JVD  Lungs: clear to auscultation bilaterally  Heart: regular rate and rhythm, S1, S2 normal, no murmur, click, rub or gallop  Abdomen: soft, non-tender. Bowel sounds normal. No masses,  no organomegaly  Extremities: extremities normal, atraumatic, no cyanosis or edema        Data Review   Recent Results (from the past 24 hour(s))   GLUCOSE, POC    Collection Time: 01/28/20 11:17 AM   Result Value Ref Range    Glucose (POC) 110 (H) 65 - 100 mg/dL    Performed by Yareli Lopez    GLUCOSE, POC    Collection Time: 01/28/20  4:33 PM   Result Value Ref Range    Glucose (POC) 114 (H) 65 - 100 mg/dL    Performed by Dulce Rivera (RN)    GLUCOSE, POC    Collection Time: 01/28/20  4:45 PM   Result Value Ref Range    Glucose (POC) 115 (H) 65 - 100 mg/dL    Performed by [address] [place] [address] ([facility]) PCT    GLUCOSE, POC    Collection Time: 01/28/20  9:22 PM   Result Value Ref Range    Glucose (POC) 112 (H) 65 - 100 mg/dL    Performed by Rashawn Roche PCT    CBC WITH MANUAL DIFF    Collection Time: 01/29/20  3:26 AM   Result Value Ref Range    WBC 6.1 3.6 - 11.0 K/uL    RBC 2.11 (L) 3.80 - 5.20 M/uL    HGB 8.3 (L) 11.5 - 16.0 g/dL    HCT 22.8 (L) 35.0 - 47.0 %    .1 (H) 80.0 - 99.0 FL    MCH 39.3 (H) 26.0 - 34.0 PG    MCHC 36.4 30.0 - 36.5 g/dL    RDW 14.8 (H) 11.5 - 14.5 %    PLATELET 507 329 - 503 K/uL    MPV 11.9 8.9 - 12.9 FL    NRBC 4.3 (H) 0  WBC    ABSOLUTE NRBC 0.26 (H) 0.00 - 0.01 K/uL    NEUTROPHILS 35 32 - 75 %    BAND NEUTROPHILS 0 0 - 6 %    LYMPHOCYTES 53 (H) 12 - 49 %    MONOCYTES 12 5 - 13 %    EOSINOPHILS 0 0 - 7 %    BASOPHILS 0 0 - 1 %    METAMYELOCYTES 0 0 %    MYELOCYTES 0 0 %    PROMYELOCYTES 0 0 %    BLASTS 0 0 %    OTHER CELL 0 0      IMMATURE GRANULOCYTES 0 %    ABS.  NEUTROPHILS 2.1 1.8 - 8.0 K/UL    ABS. LYMPHOCYTES 3.3 0.8 - 3.5 K/UL    ABS. MONOCYTES 0.7 0.0 - 1.0 K/UL    ABS. EOSINOPHILS 0.0 0.0 - 0.4 K/UL    ABS. BASOPHILS 0.0 0.0 - 0.1 K/UL    ABS. IMM. GRANS. 0.0 K/UL    DF MANUAL      RBC COMMENTS ANISOCYTOSIS  1+        RBC COMMENTS TARGET CELLS  3+        RBC COMMENTS SICKLE CELLS  1+       METABOLIC PANEL, BASIC    Collection Time: 01/29/20  6:11 AM   Result Value Ref Range    Sodium 136 136 - 145 mmol/L    Potassium 3.9 3.5 - 5.1 mmol/L    Chloride 105 97 - 108 mmol/L    CO2 27 21 - 32 mmol/L    Anion gap 4 (L) 5 - 15 mmol/L    Glucose 116 (H) 65 - 100 mg/dL    BUN 7 6 - 20 MG/DL    Creatinine 0.72 0.55 - 1.02 MG/DL    BUN/Creatinine ratio 10 (L) 12 - 20      GFR est AA >60 >60 ml/min/1.73m2    GFR est non-AA >60 >60 ml/min/1.73m2    Calcium 8.4 (L) 8.5 - 10.1 MG/DL   GLUCOSE, POC    Collection Time: 01/29/20  8:33 AM   Result Value Ref Range    Glucose (POC) 131 (H) 65 - 100 mg/dL    Performed by Stella Walden            Assessment:     Principal Problem:    Sickle cell crisis ([facility] [address] (5/22/2017)    Active Problems:    AVN of femur ([facility] [address] (1/25/2020)      Hypokalemia (1/25/2020)      Hypertension (10/3/2014)      Acute gastroenteritis (10/27/2019)        Plan:     1. Gradual improvement. We will begin to wean Dilaudid in preparation for discharge home tomorrow if all goes well.

## 2020-01-29 NOTE — PROGRESS NOTES
Oncology End of Shift Note      Bedside shift change report given to SAMIR reed (incoming nurse) by Osorio Thornton (outgoing nurse) on Nita Dark. Report included the following information SBAR, Kardex, ED Summary, Intake/Output, MAR and Recent Results. Shift Summary:   No change in patient condition throughout shift. Iv fluids running. Prn pain and nausea medications given. Patient complained of itching. Prn medication given. Issues for Physician to Address:  none     Patient on Cardiac Monitoring?    [] Yes  [x] No    Rhythm:          Shift Events        Janae Glass

## 2020-01-30 VITALS
RESPIRATION RATE: 18 BRPM | HEIGHT: 71 IN | HEART RATE: 90 BPM | WEIGHT: 219 LBS | TEMPERATURE: 99.1 F | SYSTOLIC BLOOD PRESSURE: 148 MMHG | BODY MASS INDEX: 30.66 KG/M2 | DIASTOLIC BLOOD PRESSURE: 80 MMHG | OXYGEN SATURATION: 98 %

## 2020-01-30 LAB
GLUCOSE BLD STRIP.AUTO-MCNC: 96 MG/DL (ref 65–100)
SERVICE CMNT-IMP: NORMAL

## 2020-01-30 PROCEDURE — 82962 GLUCOSE BLOOD TEST: CPT

## 2020-01-30 PROCEDURE — 74011250636 HC RX REV CODE- 250/636: Performed by: HOSPITALIST

## 2020-01-30 PROCEDURE — 74011250636 HC RX REV CODE- 250/636: Performed by: INTERNAL MEDICINE

## 2020-01-30 PROCEDURE — 74011250637 HC RX REV CODE- 250/637: Performed by: HOSPITALIST

## 2020-01-30 RX ADMIN — ONDANSETRON 4 MG: 2 INJECTION INTRAMUSCULAR; INTRAVENOUS at 00:06

## 2020-01-30 RX ADMIN — HYDROCODONE BITARTRATE AND ACETAMINOPHEN 1 TABLET: 10; 325 TABLET ORAL at 09:02

## 2020-01-30 RX ADMIN — LOSARTAN POTASSIUM 50 MG: 25 TABLET ORAL at 09:02

## 2020-01-30 RX ADMIN — HYDROXYUREA 500 MG: 500 CAPSULE ORAL at 09:30

## 2020-01-30 RX ADMIN — PANTOPRAZOLE SODIUM 40 MG: 40 TABLET, DELAYED RELEASE ORAL at 09:02

## 2020-01-30 RX ADMIN — CITALOPRAM HYDROBROMIDE 10 MG: 20 TABLET ORAL at 09:02

## 2020-01-30 RX ADMIN — DIPHENHYDRAMINE HYDROCHLORIDE 25 MG: 50 INJECTION, SOLUTION INTRAMUSCULAR; INTRAVENOUS at 03:09

## 2020-01-30 RX ADMIN — DIPHENHYDRAMINE HYDROCHLORIDE 25 MG: 50 INJECTION, SOLUTION INTRAMUSCULAR; INTRAVENOUS at 09:02

## 2020-01-30 NOTE — PROGRESS NOTES
[address] [facility] home with family today. DC order written. CM emailed CM Specialist to make PCP appt   Second IM letter issued to Pt 1/29/20. Brother will be here around 9:15 AM to transport her home in car. No further CM needs.      Dexter Streeter, [address]  Ext [phone]

## 2020-01-30 NOTE — ROUTINE PROCESS
The following appointments have been successfully scheduled: 
 
Date/time Thursday, February 06, 2020 01:15 PM 
Patient  Adelfo Hair 1960 (15BE F) #[id number] #[id number] [facility] OFFICE-PCP Appointment type Transitional Care Provider Evelyn Moses

## 2020-01-30 NOTE — PROGRESS NOTES
Oncology End of Shift Note      Bedside shift change report given to DAR BIRD RN (incoming nurse) by Isobel Simmonds, RN (outgoing nurse) on Evalene Bina. Report included the following information SBAR. Shift Summary: pain control,      Issues for Physician to Address:       Patient on Cardiac Monitoring?     [] Yes  [x] No    Rhythm:                Keira Sher RN

## 2020-01-30 NOTE — DISCHARGE INSTRUCTIONS
General Discharge Instructions    Patient ID:  Alethea Torre  [id number]  64 y.o.  1960    Patient Instructions          The following personal items were collected during your admission and were returned to you. Take Home Medications             What to do at Home    Recommended diet: Regular Diet    Recommended activity: Activity as tolerated    Follow-up with Ange Eduardo MD  in 4 days. Information obtained by :  I understand that if any problems occur once I am at home I am to contact my physician. I understand and acknowledge receipt of the instructions indicated above.                                                                                                                                            Physician's or R.N.'s Signature                                                                  Date/Time                                                                                                                                              Patient or Representative Signature                                                          Date/Time

## 2020-01-31 ENCOUNTER — PATIENT OUTREACH (OUTPATIENT)
Dept: FAMILY MEDICINE CLINIC | Age: 60
End: 2020-01-31

## 2020-01-31 NOTE — DISCHARGE SUMMARY
Darling. Jerson Stallings     Name:  Kyaw Horta  MR#:  [id number]  :  1960  ACCOUNT #:  [de-identified]  ADMIT DATE:  2020  DISCHARGE DATE:  2020      HISTORY OF PRESENT ILLNESS:  The patient is 60-year lady who was doing well up until the day of admission when she began having recurrent episodes of nausea and vomiting without diarrhea. She attributed to a food item. In any event, she got progressively worse with recalcitrant vomiting presenting to the emergency room. Initially she began having generalized pain from presumed sickle cell flare probably induced by the GI symptoms. In view of this, we elected to admit her for further evaluation and care. Past medical history, social history, review of systems, family history, physical examination is as in admitting H and P.    LABORATORY VALUES:  Initial hemoglobin 10.3, white count 10.1, MCV was 104.9, platelet count 646,985 with the following differential:  87 segs, 7 lymphocytes, 5 monocytes and 1 immature granulocyte. On 2020, white count was 6.1 with a hemoglobin of 8.3. Abnormalities on the comprehensive profile were limited to a potassium of 3.2, bilirubin was 1.9 and alkaline phosphatase 138. Her initial lactic acid surprisingly was 3.1, but repeat 4 hours later was 1.1. At the time of discharge, BUN and creatinine were 7 and 0.72 respectively. RADIOGRAPHS:  Abdominal CT reveals atrophic calcified spleen. No other abnormalities were noted. HOSPITAL COURSE:  The patient was admitted and was treated symptomatically for GI symptoms, which abated fairly rapidly. Unfortunately, her sickle cell crisis got progressively worse and she was treated with her usual regimen of analgesics in the form of Dilaudid at 1 mg q. 6 hours along with continued use of a fentanyl patch and p.r.n. use of the hydrocodone 10/325.   Hydration continued and with this, her crisis gradually improved, although slow as is usually the case. At the time of discharge, the patient was back to baseline. FINAL DIAGNOSES:  1. Probable viral gastritis. 2.  Painful sickle crisis. 3.  SF hemoglobinopathy. 4.  Status post cholecystectomy. 5.  Primary hypertension. 6.  History of depression. DISCHARGE INSTRUCTIONS:  1. Disposition:  The patient will be discharged home, ambulatory on a regular diet. 2.  Discharge medications include the following:  Phenergan 25 mg q. 6 hours p.r.n., telmisartan 40 mg daily, folic acid 1 mg daily, Celexa 10 mg daily, fentanyl patch 75 mcg q. 3 days, hydrocodone 10/325 one tablet q. 6 hours p.r.n., hydroxyurea 500 mg b.i.d., omeprazole 20 mg daily. 3.  The patient will return to the office in the next 3-5 days. 4.  She has no restrictions currently.         MD ERIK [id number]  D:  01/30/2020 8:56  T:  01/31/2020 3:28  JOB #:  [id number]

## 2020-02-04 ENCOUNTER — PATIENT OUTREACH (OUTPATIENT)
Dept: FAMILY MEDICINE CLINIC | Age: 60
End: 2020-02-04

## 2020-02-04 NOTE — PROGRESS NOTES
1/31/=Called patient to do her KRISTINA assessment, unable to reach and unable to LM. Message sent via My Chart portal as well. Also called emergency contact, [facility], LM requesting she have Zari Fee to call me. Will continue to try and reach. Called 2/4 and LM and sent My Chart message. No response. Called again 2/6 and LM for patient and son Camilo Rodriguez (emergency contact). No response at this time.

## 2020-02-07 ENCOUNTER — PATIENT OUTREACH (OUTPATIENT)
Dept: FAMILY MEDICINE CLINIC | Age: 60
End: 2020-02-07

## 2020-02-07 NOTE — PROGRESS NOTES
CTN had attempted to reach patient , and  without response. Patient called CTN today. CTN reopened the [place] encounter. Hospital Discharge Follow-Up      Date/Time:  2020 10:00am  [address] Ambulatory Care Coordination Team  Phone [phone]    Patient was admitted to [facility] on  and discharged on  for sickle cell crisis. The physician discharge summary was available at the time of outreach. Patient was contacted within 8 business days of discharge. Top Challenges reviewed with the provider   [facility] - Sickle cell crisis  Advance Care Planning:   Does patient have an Advance Directive:  not on file; education provided        Method of communication with provider :chart routing    Was this a readmission? no  Patient stated reason for the readmission:     Care Transition Nurse (CTN) contacted the patient by telephone to perform post hospital discharge assessment. Verified name and  with patient as identifiers. Provided introduction to self, and explanation of the CTN role. Patient says she is doing much better, no pain. Was supposed to see her pcp yesterday for KRISTINA, but missed the appointment, had fallen asleep. Rescheduled for  with Anabella Angry at 3:45pm. Offered to call his office and request earlier appointment but she declined, said she was feeling so much better, would rather wait until . Patient received hospital discharge instructions. CTN reviewed discharge instructions and red flags with patient who verbalized understanding. Patient given an opportunity to ask questions and does not have any further questions or concerns at this time. The patient agrees to contact the PCP office for questions related to their healthcare. CTN provided contact information for future reference.     Disease Specific:   N/A    Patients top risk factors for readmission: medical condition- sickle cell anemia     Home Health orders at discharge: [address]: na  Date of initial visit: [address] [place] [address] ordered at discharge: none  [facility] [address] received: na    Medication(s):   New Medications at Discharge: Duragesic 75mcg/hr- one patch transdermal q 72 hours; Changed Medications at Discharge: none  Discontinued Medications at Discharge: none    Medication reconciliation was performed with patient, who verbalizes understanding of administration of home medications. There were no barriers to obtaining medications identified at this time. Referral to Pharm D needed: no     Current Outpatient Medications   Medication Sig    fentaNYL (DURAGESIC) 75 mcg/hr 1 Patch by TransDERmal route every seventy-two (72) hours.  multivitamin (ONE A DAY) tablet Take 1 Tab by mouth daily.  hydroxyurea (HYDREA) 500 mg capsule 1 tablet two times a day    hydrocortisone (CORTISONE) 1 % topical cream Apply  to affected area two (2) times daily as needed for Skin Irritation. use thin layer    telmisartan (MICARDIS) 40 mg tablet Take 1 Tab by mouth nightly.  folic acid (FOLVITE) 1 mg tablet Take 1 mg by mouth nightly.  omeprazole (PRILOSEC OTC) 20 mg tablet Take 20 mg by mouth nightly.  promethazine (PHENERGAN) 25 mg tablet Take 1 Tab by mouth every eight (8) hours as needed for Nausea.  citalopram (CELEXA) 10 mg tablet TAKE 1 TABLET BY MOUTH EVERY NIGHT AT BEDTIME     No current facility-administered medications for this visit. There are no discontinued medications.     [facility] follow up appointment(s):   Future Appointments   Date Time Provider Waldo Calvo   2/13/2020  3:45 PM Taylor Azevedo MD [address] [place] [address]      Non-[facility] follow up appointment(s):   [facility]:  information provided as a resource       Goals      Admission Care      9/12/2019:  [facility] sent to ED Readmit [facility] [place] [id number] [facility] of patient's readmission submitted for Case Review Team today. Last call in for controlled med 9/4/2019. EW         Attends follow up appointments on schedule      6/7/2019:  Patient scheduled for ROME VAUGHN appt as requested: Friday 6/14/2019:  12PM.  EW     6/25/2019:  Patient returns to f/u with PCP re crisis prevention/medication evaluation. EW     7/26/2019:  Met patient at office visit today. EW   8/12/2019: Follow KRISTINA appointment attended as scheduled. EW     12/2/2019:  Patient rescheduled office visit from 12/3 to 12/12/2019 due to fatigue from getting back from holiday trip. Rescheduled. EW       Coordinate pain management plan for patient. 1/22/2020:  ACM received incoming call from patient stating she is presently in the hospital.  Requested contact to be made to PCP informing that she was told pain medication would be changed from hydrocodone to dilaudid but such has not occurred. States last dose was given at 12:30 but has not been effective in managing her pain (1:30pm). Contact message sent to Attending PCP re the same. EW.         Facilitate transitions of care (ie. palliative care, assisted living, nursing home, changes in living arrangements). 9/19/2019:  Consult w/ PCP for Ambulatory Pre D/C Planning. Consult done w/ Inpatient CM Ana Maria re d/c plans/concerns of patient; none specific-depression screening. NN spoke with Charge Nurse Kenisha Gonzalez; states depression screen is done under the admissions database questionaire;  Review of questionairre done: specifically suicide in nature. NN contacted patient for ambulatory pre d/c concerns re medication as previously experienced with [facility] . Patient denied any forseen concerns or problems w/ the needed pain medications or living arrangement. Depression screening done from the 2 PQ screening.   Patient denied depression; began telling how she has a daughter and grandchildren here in town that is the center of her debra, and another one on the way within months. States also son & wife who lives in [place] is also expecting a new baby which she is looking forward to going down the first of Nov.  Patient states she has too much going on to be depressed to the point of not wanting to do anything; states the depression she experiences is when she is in \"this hospital and can't get out. .. \". Patient states she sees her parents who are still living and doing well;  States she has lots of girls friends and they do a lot together--which she is missing out on. Patient teach-back done on the new medication: hydroxyurea. As previously informed, patient again states all the side effects she heard about, that when it first was offered her at [address] [place] [address], it was primarily used for chemotherapy with multiple side effects but states now having tried everything else, and her PCP was comfortable in her trying; she states why not. .her son & daughter agrees with her trying it out to see how it is tolerated. Discussed the known side effects the hydroxyurea may bring, but she is willing to give it a try. Patient sounding upbeat and stating she is ready to go home; that she has a lot to look forward to. Patient further discussed weight loss plan ( as PCP had advised during office visits):  States she will give up drinking sodas-replacing with water and less eating out/buying prepared foods and begin to eat more healthy in light of \"foods that build Vit B levels in trying to work with this new medicine\". NN will continue to f/u post d/c.   EW     Sunday: 10/13/2019: Vikki Zhou 9/6-9/20/2019:  Sickle Cell Crisis Pain-Episode Closure  Call received from patient inquiring of PCP's visit. Remains c/o sickle cell pain but states getting better-no chest pain as bad. Denied immediate need for seeing Attending-states has been seen by Dr. Macho Holden. Patient is now readmitted for SS Crisis, Lower Back Pain & Chest Discomfort.   Will close & resolve  previous KRISTINA episode for CTN KRISTINA management. EW         Knowledge and adherence of prescribed medication (ie. action, side effects, missed dose, etc.).      9/23/2019:  Medication Consult. Email sent to PharmD New Ayush re narcan recommendation. EW    10/2/2019:  Patient discussion centered around new med Hydroxyurea-denied any problems at all; however, PCP in office note states dosage needs to be higher--for this patient the titration probably is best.  Discussed side effects and when to contact the PCP:   fever, chills or sore throat, or other symptoms of a cold or flu. Do not treat self; decreases your body's ability to fight infections discussed. Patient states going out of town in 3 days; advised to try to avoid being around people who are sick. Also discussed nolaxone (narcan); patient stated she has been informed and knows about the medication and the usage for over taking pain meds, but states does not feel she takes that much, plus states she knows when to slack off and take less. Will continue to f/u.  EW     12/2/2019:  Patient states she is appreciating the hydroxyurea medication more than thought. EW        Patient/Family verbalizes understanding of self-management of chronic disease. 5/9/2019: [facility]IP [facility] 4/14-5/2/2019:  Sickle Cell Crisis f/u -see coordinate Pain Management Plan goal.  EW     6/7/2019:  Continued teachings done with rationale:    Drink plenty of fluids- dehydrated can increase your risk of a sickle crisis. Recommendation is about 8 glasses a day and drink more fluid if youre exercising or in hot weather. Sleep--encouraged to get enough sleep. Eat right-plenty of fruits, vegetables, whole grains, and protein--discussed the type meat/proteins she enjoyed; Carmina Godfrey   Encouraged exercise in moderation for her; to aim to increase simply walking more around the house/any mod exercise a week;  Encouraged patient to PCP before starting a new exercise routine--Patient stated PCP has been on her at every visit to increase exercise/activity on a daily basis--discussed physical activity is key in staying healthy. NN warned against trying to  overdo it. Rest when you get tired. Take medicine as ordered. Make sure  take your prescription medicine as directed. Get medical and lab tests that your doctor recommends. Discussion on getting the annual flu shot, and pneumococcal and meningococcal vaccines as recommended by PCP in that common illnesses, like the flu, can quickly become dangerous. Discussion on Extreme temperatures: extreme heat or cold, or any craig changes in temperatures, could set off a crisis. Reminded patient of High altitude--leading to Lack of oxygen at high altitudes could trigger a crisis. (Reminded that Planes, because theyre pressurized, shouldnt be a problem). Alcohol. It can make you dehydrated--patient denied consumptions of;    Smoking. This can trigger a lung condition called acute chest syndrome. This is when sickle cells stick together and block oxygen from getting into your lungs (even warned patient re walking in and out of stores that smokers congregate smoking). Infections. Common illnesses can be very serious for people with SCD. Wash your hands before eating or after using the bathroom. Wash your fruits and veggies, and avoid raw meat, eggs, and unpasteurized milk. Stress-  Though sometimes hard to avoid, but stress or depression can trigger a crisis (states she has not been depressed as much as usual)--advised to try to take time to relax or find techniques that help you calm down and such to prevent and/or relieve depression episodes. Patient agreed to try. EW      6/14/2019:  [facility] 5/26-6/6/2019:  Sickle Cell Crisis f/u  Patient in for f/u office visit today. 1.  Continued discussion of not being able tto obtain Fentanyl Patch & Hydrocodone simultaneously taken. Discussed the opiod crisis and what were her thoughts on the same.     2.  Supported and discussed PCP concerns for weight gain:   Eating same time each day; avoiding snacks; avoiding consumption of processed carbohydrates. Understanding assured with reiteration of advisement. EW     6/19/2019;  Patient Teach-back done:    Drinking plenty of fluids-verbalized understanding of prevention of increased risk of a sickle crisis. States not 8 glasses yet but more than 5.  .  Sleep--states sleeping better but with medications. .  Eat right- agreed to increased buying of fruits & vegetables, whole grains, and sufficient meats without a lot of fat as protein. .States she is walking more as exercise.  -discussed physical activity is key in staying healthy. States she is getting more rest.  EW     6/25/2019:  Patient at office contact for PCP medication re-evaluation. Patient Review:  Proper diet & weight reduction as reviewed by PCP; The 3 advisements from PCP review: 1. eating meals 2.  eliminating snacks,  3. avoiding the consumption of processed carbs. Patient agrees to work on depression-continue anti-depressants as ordered. Advisement:  Eating the right foods at meals; avoiding sugary foods--advised that healthy eating affects mental and physical status; drinking sufficient water prevents dehydration. EW     8/12/2019:  Pain Coordination discussion w/ PCP. EW     9/4/2019:  Call received from patient stating she is run out of pain medication; states pharmacy states no refill on this type medication. Patient requesting refill. C/o painful joints and needs pain medication as soon as possible. Advised PCP at [facility] office and medication request for this  must go through Medication Specialist Jeffrey Hamm; that prescriptions for controlled substance as this are not able to be simply called in, thus NN unable to do so; that Jeffrey Hamm will reach him with patient concerns hourly if need. Agrees to contact Jeffrey Hamm. Dressing/Staying warm/drinking warm liquids discussed.   Consult done w/ PCP-agreed to look at request/need later this evening. EW.      9/23/2019:  No complaints re hydroxyurea. States diet high on the priority list.   EW     12/2/2019:  Resume ACM Complex CM s/p 10/31 discharge for Sickle Cell Pain Crisis. States since being on hydroxyurea has changed her body awareness. States the continued pain is so much less. States she was away on vacation but rested when needed and knew her limits. Reports limiting sugary and salty foods as much as possible and drinking more water. Reports intermittent fatigue still; states got off road from trip last evening and is requesting to change office visit from tomorrow to 12/12/2019. Will continue to f/u.  EW     12/12/2019:  ACM Complex CM:  Sickle Cell Pain f/u:  C/o minimal pain. Reports she is tolerating theHydroxyurea 500 mg b.i.d.  well with no adverse effects. Patient has lost weight, approx 9 lbs. Discussion on hypertension & things to do for prevention of depression continues. EW.      2/7/20 [id number] 1/20-1/30 Sickle cell crisis. Reviewed recommendations per 6/2019 note of rest, eating healthy meals, fluids, exercise in moderation, etc. Reports worst is behind her, feeling much better. Attend ROME BUIS with pcp 2/13. Given CTN contact info if any questions/concerns. mora      .

## 2020-02-13 ENCOUNTER — OFFICE VISIT (OUTPATIENT)
Dept: INTERNAL MEDICINE CLINIC | Age: 60
End: 2020-02-13

## 2020-02-13 VITALS
BODY MASS INDEX: 28.57 KG/M2 | DIASTOLIC BLOOD PRESSURE: 89 MMHG | RESPIRATION RATE: 16 BRPM | HEART RATE: 109 BPM | WEIGHT: 204.1 LBS | HEIGHT: 71 IN | OXYGEN SATURATION: 98 % | SYSTOLIC BLOOD PRESSURE: 138 MMHG

## 2020-02-13 DIAGNOSIS — I87.2 VENOUS INSUFFICIENCY: ICD-10-CM

## 2020-02-13 DIAGNOSIS — I10 ESSENTIAL HYPERTENSION: ICD-10-CM

## 2020-02-13 DIAGNOSIS — E66.3 OVERWEIGHT (BMI 25.0-29.9): ICD-10-CM

## 2020-02-13 DIAGNOSIS — F32.A DEPRESSION, UNSPECIFIED DEPRESSION TYPE: ICD-10-CM

## 2020-02-13 DIAGNOSIS — D57.1 HB-SS DISEASE WITHOUT CRISIS (HCC): Primary | ICD-10-CM

## 2020-02-13 RX ORDER — FENTANYL 75 UG/H
1 PATCH TRANSDERMAL
Qty: 10 PATCH | Refills: 0 | Status: SHIPPED | OUTPATIENT
Start: 2020-02-13 | End: 2020-03-14

## 2020-02-13 NOTE — PROGRESS NOTES
Chief Complaint   Patient presents with    Sickle Cell Disease     2 month follow up      1. Have you been to the ER, urgent care clinic since your last visit? Hospitalized since your last visit? Yes When: 1/20/20 Where: [facility]  Reason for visit: Sickle Cell Crisis    2. Have you seen or consulted any other health care providers outside of the [address] [place] [address] since your last visit? Include any pap smears or colon screening.  No

## 2020-02-14 NOTE — PROGRESS NOTES
[facility]  [place] [address]  [address] [place] [address]  Phone:  [phone]  Fax: [phone]       Chief Complaint   Patient presents with    Sickle Cell Disease     2 month follow up    . SUBJECTIVE:    Radu Quarles is a 64 y.o. female Comes in for return visit stating ______________ done well. She was most recently hospitalized for a viral gastroenteritis complicated by a sickle flare ________________. She continues with pain medication for breakthrough pain. She has a past history of primary hypertension, venous insufficiency and mild ______________. She is ________________ fatigue now because she was at her daughter's house assisting with the care of her baby. Current Outpatient Medications   Medication Sig Dispense Refill    fentaNYL (DURAGESIC) 75 mcg/hr 1 Patch by TransDERmal route every seventy-two (72) hours for 30 days. Max Daily Amount: 1 Patch. 10 Patch 0    multivitamin (ONE A DAY) tablet Take 1 Tab by mouth daily.  hydroxyurea (HYDREA) 500 mg capsule 1 tablet two times a day 60 Cap 11    hydrocortisone (CORTISONE) 1 % topical cream Apply  to affected area two (2) times daily as needed for Skin Irritation. use thin layer 30 g 11    telmisartan (MICARDIS) 40 mg tablet Take 1 Tab by mouth nightly. 30 Tab 11    folic acid (FOLVITE) 1 mg tablet Take 1 mg by mouth nightly.  omeprazole (PRILOSEC OTC) 20 mg tablet Take 20 mg by mouth nightly.  promethazine (PHENERGAN) 25 mg tablet Take 1 Tab by mouth every eight (8) hours as needed for Nausea.  90 Tab 11    citalopram (CELEXA) 10 mg tablet TAKE 1 TABLET BY MOUTH EVERY NIGHT AT BEDTIME 90 Tab 3     Past Medical History:   Diagnosis Date    Anemia     SC hemoglobinopathy    Chronic pain     Depression     Hypertension     Liver disease     hepatitis C; pt reports \"cured\"    Sickle cell disease ([facility] [address]      Past Surgical History:   Procedure Laterality Date    BREAST SURGERY PROCEDURE UNLISTED      2 cysts removed from R breast    CHEST SURGERY PROCEDURE UNLISTED      status post thor for removal of a benign     HX BREAST BIOPSY Right 05/2007    negative    HX CHOLECYSTECTOMY      HX ORTHOPAEDIC      bony curettage of an ostial myelitis focus     Allergies   Allergen Reactions    Dilaudid [Hydromorphone (Bulk)] Itching     Can tolerate with benadryl and ondansetron    Percocet [Oxycodone-Acetaminophen] Itching         REVIEW OF SYSTEMS:  General: negative for - chills or fever  ENT: negative for - headaches, nasal congestion or tinnitus  Respiratory: negative for - cough, hemoptysis, shortness of breath or wheezing  Cardiovascular : negative for - chest pain, edema, palpitations or shortness of breath  Gastrointestinal: negative for - abdominal pain, blood in stools, heartburn or nausea/vomiting  Genito-Urinary: no dysuria, trouble voiding, or hematuria  Musculoskeletal: negative for - gait disturbance, joint pain, joint stiffness or joint swelling  Neurological: no TIA or stroke symptoms  Hematologic: no bruises, no bleeding, no swollen glands  Integument: no lumps, mole changes, nail changes or rash  Endocrine: no malaise/lethargy or unexpected weight changes      Social History     Socioeconomic History    Marital status:      Spouse name: Not on file    Number of children: 2    Years of education: Not on file    Highest education level: Not on file   Occupational History    Occupation: disabled---Sickle cell disease   Tobacco Use    Smoking status: Never Smoker    Smokeless tobacco: Never Used   Substance and Sexual Activity    Alcohol use: No    Drug use: No    Sexual activity: Yes     Partners: Male     Family History   Problem Relation Age of Onset    Hypertension Mother     Hypertension Father        OBJECTIVE:    Visit Vitals  /89   Pulse (!) 109   Resp 16   Ht 5' 11\" (1.803 m)   Wt 204 lb 1.6 oz (92.6 kg)   SpO2 98%   BMI 28.47 kg/m² CONSTITUTIONAL: well , well nourished, appears age appropriate  EYES: perrla, eom intact  ENMT:moist mucous membranes, pharynx clear  NECK: supple. Thyroid normal  RESPIRATORY: Chest: clear to ascultation and percussion   CARDIOVASCULAR: Heart: regular rate and rhythm  GASTROINTESTINAL: Abdomen: soft, bowel sounds active  HEMATOLOGIC: no pathological lymph nodes palpated  MUSCULOSKELETAL: Extremities: no edema, pulse 1+   INTEGUMENT: No unusual rashes or suspicious skin lesions noted. Nails appear normal.  NEUROLOGIC: non-focal exam   MENTAL STATUS: alert and oriented, appropriate affect      ASSESSMENT:  1. Hb-SS disease without crisis ([address]    2. Essential hypertension    3. Overweight (BMI 25.0-29.9)    4. Depression, unspecified depression type    5. Venous insufficiency        PLAN:  (poor audio quality - skipping)    1. The patient has S-hemoglobinopathy. This is reasonably stable. She will continue analgesics for now. He was given a refill on the Fentanyl patch. 2. Blood pressure is excellent, no adjustments are made. 3. I encouraged weight reduction. This can occur by eating meals, eliminating snacks and avoiding the consumption of processed carbohydrates. 4. Her depression is reasonably stable.  _____________. 5. She does have trace edema today related to her venous insufficiency, which has been quite stable. .  Orders Placed This Encounter    fentaNYL ([address]) 75 mcg/hr         Follow-up and Dispositions    · Return in about 3 months (around 5/13/2020).            Karo Aleman MD

## 2020-02-21 DIAGNOSIS — D57.00 SICKLE CELL CRISIS (HCC): ICD-10-CM

## 2020-02-27 RX ORDER — HYDROCODONE BITARTRATE AND ACETAMINOPHEN 10; 325 MG/1; MG/1
1 TABLET ORAL
Qty: 120 TAB | Refills: 0 | Status: SHIPPED | OUTPATIENT
Start: 2020-02-27 | End: 2020-03-26 | Stop reason: SDUPTHER

## 2020-03-26 DIAGNOSIS — D57.00 SICKLE CELL CRISIS (HCC): ICD-10-CM

## 2020-03-27 RX ORDER — HYDROCODONE BITARTRATE AND ACETAMINOPHEN 10; 325 MG/1; MG/1
1 TABLET ORAL
Qty: 120 TAB | Refills: 0 | Status: SHIPPED | OUTPATIENT
Start: 2020-03-27 | End: 2020-04-27 | Stop reason: SDUPTHER

## 2020-04-13 ENCOUNTER — VIRTUAL VISIT (OUTPATIENT)
Dept: INTERNAL MEDICINE CLINIC | Age: 60
End: 2020-04-13

## 2020-04-13 VITALS — HEIGHT: 71 IN | WEIGHT: 204 LBS | BODY MASS INDEX: 28.56 KG/M2

## 2020-04-13 DIAGNOSIS — D57.00 HB-SS DISEASE WITH CRISIS (HCC): ICD-10-CM

## 2020-04-13 DIAGNOSIS — J31.0 RHINITIS, UNSPECIFIED TYPE: Primary | ICD-10-CM

## 2020-04-13 DIAGNOSIS — E66.3 OVERWEIGHT (BMI 25.0-29.9): ICD-10-CM

## 2020-04-13 DIAGNOSIS — I10 ESSENTIAL HYPERTENSION: ICD-10-CM

## 2020-04-13 RX ORDER — FENTANYL 75 UG/H
1 PATCH TRANSDERMAL
Qty: 10 PATCH | Refills: 0 | Status: SHIPPED | OUTPATIENT
Start: 2020-04-13 | End: 2020-05-13

## 2020-04-13 NOTE — PROGRESS NOTES
Consent: Constance Ferreira, who was seen by synchronous (real-time) audio-video technology, and/or her healthcare decision maker, is aware that this patient-initiated, Telehealth encounter on 4/13/2020 is a billable service, with coverage as determined by her insurance carrier. She is aware that she may receive a bill and has provided verbal consent to proceed: Yes. Assessment & Plan:   Diagnoses and all orders for this visit:    1. Rhinitis, unspecified type    2. Hb-SS disease with crisis ([address]  -     fentaNYL (DURAGESIC) 75 mcg/hr; 1 Patch by TransDERmal route every seventy-two (72) hours for 30 days. Max Daily Amount: 1 Patch. 3. Overweight (BMI 25.0-29.9)    4. Essential hypertension    1. The patient symptoms suggest a rhinitis. I suggest Claritin-D twelve 1 tablet twice a day 7 AM and 3 PM.  2.  For sickle cell disease she will be given a prescription for fentanyl. She remains on a hydrocodone. Hydroxyurea has helped significantly. 3.  I encourage weight loss. This can be accomplished by eating meals, eliminating snacks, and avoiding the consumption of processed carbohydrates. 4.  Historically her blood pressure has been normal.    Follow-up and Dispositions    · Return in about 3 months (around 7/13/2020). I spent at least 25 minutes with this established patient, and >50% of the time was spent counseling and/or coordinating care regarding Rhinitis, sickle cell anemia, hypertension,etc  712  Subjective: Constance Ferreira is a 64 y.o. female who was seen for Sickle Cell Disease (2 month follow up)  States that she has had for the last 2 weeks nasal congestion and frequent clearing of her throat. Denies any constitutional symptoms or other pulmonary symptoms such as dyspnea or wheezing. Her sickle cell disease has been reasonably stable. She wishes to have refills on her Duragesic patch. Unfortunately she has had no meaningful weight loss.     She has had no cardiovascular symptoms. She remains on her antihypertensive medication. Prior to Admission medications    Medication Sig Start Date End Date Taking? Authorizing Provider   fentaNYL (DURAGESIC) 75 mcg/hr 1 Patch by TransDERmal route every seventy-two (72) hours for 30 days. Max Daily Amount: 1 Patch. 4/13/20 5/13/20 Yes Frances Nguyen MD   HYDROcodone-acetaminophen [facility])  mg tablet Take 1 Tab by mouth every six (6) hours as needed for Pain for up to 30 days. Max Daily Amount: 4 Tabs. [id number].[id number] 3/27/20 4/26/20 Yes Frances Nguyen MD   multivitamin (ONE A DAY) tablet Take 1 Tab by mouth daily. Yes Provider, Historical   hydroxyurea (HYDREA) 500 mg capsule 1 tablet two times a day 10/17/19  Yes Frances Nguyen MD   hydrocortisone (CORTISONE) 1 % topical cream Apply  to affected area two (2) times daily as needed for Skin Irritation. use thin layer 7/26/19  Yes Frances Nguyen MD   telmisartan (MICARDIS) 40 mg tablet Take 1 Tab by mouth nightly. 7/1/19  Yes Frances Nguyen MD   folic acid (FOLVITE) 1 mg tablet Take 1 mg by mouth nightly. Yes Provider, Historical   omeprazole (PRILOSEC OTC) 20 mg tablet Take 20 mg by mouth nightly. Yes Other, MD Ean   promethazine (PHENERGAN) 25 mg tablet Take 1 Tab by mouth every eight (8) hours as needed for Nausea.  5/9/19  Yes Frances Nguyen MD   citalopram (CELEXA) 10 mg tablet TAKE 1 TABLET BY MOUTH EVERY NIGHT AT BEDTIME 5/9/19  Yes Frances Nguyen MD     Allergies   Allergen Reactions    Dilaudid [Hydromorphone (Bulk)] Itching     Can tolerate with benadryl and ondansetron    Percocet [Oxycodone-Acetaminophen] Itching       Allergies   Allergen Reactions    Dilaudid [Hydromorphone (Bulk)] Itching     Can tolerate with benadryl and ondansetron    Percocet [Oxycodone-Acetaminophen] Itching     Past Medical History:   Diagnosis Date    Anemia     SC hemoglobinopathy    Chronic pain     Depression     Hypertension     Liver disease     hepatitis C; pt reports \"cured\"    Sickle cell disease ([facility] [address]      Past Surgical History:   Procedure Laterality Date    BREAST SURGERY PROCEDURE UNLISTED      2 cysts removed from R breast    CHEST SURGERY PROCEDURE UNLISTED      status post thor for removal of a benign     HX BREAST BIOPSY Right 05/2007    negative    HX CHOLECYSTECTOMY      HX ORTHOPAEDIC      bony curettage of an ostial myelitis focus     Family History   Problem Relation Age of Onset    Hypertension Mother     Hypertension Father        ROS:14 point ROS neg.         Objective:   Vital Signs: (As obtained by patient/caregiver at home)  Visit Vitals  Ht 5' 11\" (1.803 m)   Wt 204 lb (92.5 kg)   BMI 28.45 kg/m²        [INSTRUCTIONS:  \"[x]\" Indicates a positive item  \"[]\" Indicates a negative item  -- DELETE ALL ITEMS NOT EXAMINED]    Constitutional: [x] Appears well-developed and well-nourished [x] No apparent distress      [] Abnormal -     Mental status: [x] Alert and awake  [x] Oriented to person/place/time [x] Able to follow commands    [] Abnormal -     Eyes:   EOM    [x]  Normal    [] Abnormal -   Sclera  []  Normal    [] Abnormal -          Discharge []  None visible   [] Abnormal -     HENT: [x] Normocephalic, atraumatic  [] Abnormal -   [x] Mouth/Throat: Mucous membranes are moist    External Ears [x] Normal  [] Abnormal -    Neck: [x] No visualized mass [] Abnormal -     Pulmonary/Chest: [x] Respiratory effort normal   [x] No visualized signs of difficulty breathing or respiratory distress        [] Abnormal -      Musculoskeletal:   [x] Normal gait with no signs of ataxia         [x] Normal range of motion of neck        [] Abnormal -     Neurological:        [x] No Facial Asymmetry (Cranial nerve 7 motor function) (limited exam due to video visit)          [x] No gaze palsy        [] Abnormal -          Skin:        [x] No significant exanthematous lesions or discoloration noted on facial skin         [] Abnormal -            Psychiatric:       [x] Normal Affect [] Abnormal -        [x] No Hallucinations    Other pertinent observable physical exam findings:-        We discussed the expected course, resolution and complications of the diagnosis(es) in detail. Medication risks, benefits, costs, interactions, and alternatives were discussed as indicated. I advised her to contact the office if her condition worsens, changes or fails to improve as anticipated. She expressed understanding with the diagnosis(es) and plan. Karen Krishnan is a 64 y.o. female being evaluated by a video visit encounter for concerns as above. A caregiver was present when appropriate. Due to this being a TeleHealth encounter (During ORJPJ-10 public health emergency), evaluation of the following organ systems was limited: Vitals/Constitutional/EENT/Resp/CV/GI//MS/Neuro/Skin/Heme-Lymph-Imm. Pursuant to the emergency declaration under the [facility], [facility] waiver authority and the [employer] Act, this Virtual  Visit was conducted, with patient's (and/or legal guardian's) consent, to reduce the patient's risk of exposure to COVID-19 and provide necessary medical care. Services were provided through a video synchronous discussion virtually to substitute for in-person clinic visit. Patient and provider were located at their individual homes. Please note that this dictation was completed with [employer], the computer voice recognition software. Quite often unanticipated grammatical, syntax, homophones, and other interpretive errors are inadvertently transcribed by the computer software. Please disregard these errors. Please excuse any errors that have escaped final proofreading. Thank you.     Avinash Ortiz MD

## 2020-04-13 NOTE — PROGRESS NOTES
Chief Complaint   Patient presents with    Sickle Cell Disease     2 month follow up     1. Have you been to the ER, urgent care clinic since your last visit? Hospitalized since your last visit? No    2. Have you seen or consulted any other health care providers outside of the [address] [place] [address] since your last visit? Include any pap smears or colon screening.  No

## 2020-04-27 DIAGNOSIS — D57.00 SICKLE CELL CRISIS (HCC): ICD-10-CM

## 2020-04-27 RX ORDER — HYDROCODONE BITARTRATE AND ACETAMINOPHEN 10; 325 MG/1; MG/1
1 TABLET ORAL
Qty: 120 TAB | Refills: 0 | Status: SHIPPED | OUTPATIENT
Start: 2020-04-27 | End: 2020-05-27 | Stop reason: SDUPTHER

## 2020-04-28 DIAGNOSIS — F32.A DEPRESSION, UNSPECIFIED DEPRESSION TYPE: ICD-10-CM

## 2020-04-28 RX ORDER — CITALOPRAM 10 MG/1
TABLET ORAL
Qty: 90 TAB | Refills: 3 | Status: SHIPPED | OUTPATIENT
Start: 2020-04-28 | End: 2021-04-21 | Stop reason: SDUPTHER

## 2020-05-26 DIAGNOSIS — D57.1 HB-SS DISEASE WITHOUT CRISIS (HCC): ICD-10-CM

## 2020-05-26 RX ORDER — PROMETHAZINE HYDROCHLORIDE 25 MG/1
25 TABLET ORAL
Qty: 90 TAB | Refills: 11 | Status: SHIPPED | OUTPATIENT
Start: 2020-05-26 | End: 2021-06-21

## 2020-05-27 DIAGNOSIS — D57.00 SICKLE CELL CRISIS (HCC): ICD-10-CM

## 2020-05-27 RX ORDER — FENTANYL 75 UG/H
1 PATCH TRANSDERMAL
Qty: 10 PATCH | Refills: 0 | Status: SHIPPED | OUTPATIENT
Start: 2020-05-27 | End: 2020-06-26

## 2020-05-27 RX ORDER — HYDROCODONE BITARTRATE AND ACETAMINOPHEN 10; 325 MG/1; MG/1
1 TABLET ORAL
Qty: 120 TAB | Refills: 0 | Status: SHIPPED | OUTPATIENT
Start: 2020-05-27 | End: 2020-06-25 | Stop reason: SDUPTHER

## 2020-06-25 DIAGNOSIS — D57.00 SICKLE CELL CRISIS (HCC): ICD-10-CM

## 2020-06-25 RX ORDER — HYDROCODONE BITARTRATE AND ACETAMINOPHEN 10; 325 MG/1; MG/1
1 TABLET ORAL
Qty: 120 TAB | Refills: 0 | Status: SHIPPED | OUTPATIENT
Start: 2020-06-25 | End: 2020-07-25

## 2020-07-04 ENCOUNTER — APPOINTMENT (OUTPATIENT)
Dept: GENERAL RADIOLOGY | Age: 60
DRG: 812 | End: 2020-07-04
Attending: EMERGENCY MEDICINE
Payer: MEDICARE

## 2020-07-04 ENCOUNTER — HOSPITAL ENCOUNTER (INPATIENT)
Age: 60
LOS: 11 days | Discharge: HOME | DRG: 812 | End: 2020-07-15
Attending: EMERGENCY MEDICINE | Admitting: INTERNAL MEDICINE
Payer: MEDICARE

## 2020-07-04 DIAGNOSIS — N30.00 ACUTE CYSTITIS WITHOUT HEMATURIA: Primary | ICD-10-CM

## 2020-07-04 DIAGNOSIS — D57.00 SICKLE CELL CRISIS (HCC): ICD-10-CM

## 2020-07-04 DIAGNOSIS — R65.10 SIRS (SYSTEMIC INFLAMMATORY RESPONSE SYNDROME) (HCC): ICD-10-CM

## 2020-07-04 PROBLEM — N39.0 UTI (URINARY TRACT INFECTION): Status: ACTIVE | Noted: 2020-07-04

## 2020-07-04 LAB
ALBUMIN SERPL-MCNC: 4.2 G/DL (ref 3.5–5)
ALBUMIN/GLOB SERPL: 0.7 {RATIO} (ref 1.1–2.2)
ALP SERPL-CCNC: 123 U/L (ref 45–117)
ALT SERPL-CCNC: 19 U/L (ref 12–78)
ANION GAP SERPL CALC-SCNC: 10 MMOL/L (ref 5–15)
APPEARANCE UR: CLEAR
AST SERPL-CCNC: 33 U/L (ref 15–37)
ATRIAL RATE: 98 BPM
BACTERIA URNS QL MICRO: ABNORMAL /HPF
BASOPHILS # BLD: 0 K/UL (ref 0–0.1)
BASOPHILS NFR BLD: 0 % (ref 0–1)
BILIRUB SERPL-MCNC: 1.5 MG/DL (ref 0.2–1)
BILIRUB UR QL CFM: NEGATIVE
BUN SERPL-MCNC: 12 MG/DL (ref 6–20)
BUN/CREAT SERPL: 12 (ref 12–20)
CALCIUM SERPL-MCNC: 9.3 MG/DL (ref 8.5–10.1)
CALCULATED P AXIS, ECG09: 51 DEGREES
CALCULATED R AXIS, ECG10: 49 DEGREES
CALCULATED T AXIS, ECG11: 29 DEGREES
CHLORIDE SERPL-SCNC: 105 MMOL/L (ref 97–108)
CO2 SERPL-SCNC: 24 MMOL/L (ref 21–32)
COLOR UR: ABNORMAL
CREAT SERPL-MCNC: 1 MG/DL (ref 0.55–1.02)
DIAGNOSIS, 93000: NORMAL
DIFFERENTIAL METHOD BLD: ABNORMAL
EOSINOPHIL # BLD: 0 K/UL (ref 0–0.4)
EOSINOPHIL NFR BLD: 0 % (ref 0–7)
EPITH CASTS URNS QL MICRO: ABNORMAL /LPF
ERYTHROCYTE [DISTWIDTH] IN BLOOD BY AUTOMATED COUNT: 13.8 % (ref 11.5–14.5)
GLOBULIN SER CALC-MCNC: 5.8 G/DL (ref 2–4)
GLUCOSE SERPL-MCNC: 144 MG/DL (ref 65–100)
GLUCOSE UR STRIP.AUTO-MCNC: NEGATIVE MG/DL
HCT VFR BLD AUTO: 27.6 % (ref 35–47)
HGB BLD-MCNC: 10.1 G/DL (ref 11.5–16)
HGB UR QL STRIP: NEGATIVE
IMM GRANULOCYTES # BLD AUTO: 0 K/UL (ref 0–0.04)
IMM GRANULOCYTES NFR BLD AUTO: 0 % (ref 0–0.5)
KETONES UR QL STRIP.AUTO: 15 MG/DL
LACTATE BLD-SCNC: 0.82 MMOL/L (ref 0.4–2)
LACTATE BLD-SCNC: 2.09 MMOL/L (ref 0.4–2)
LEUKOCYTE ESTERASE UR QL STRIP.AUTO: ABNORMAL
LYMPHOCYTES # BLD: 0.6 K/UL (ref 0.8–3.5)
LYMPHOCYTES NFR BLD: 7 % (ref 12–49)
MAGNESIUM SERPL-MCNC: 2.4 MG/DL (ref 1.6–2.4)
MCH RBC QN AUTO: 41.4 PG (ref 26–34)
MCHC RBC AUTO-ENTMCNC: 36.6 G/DL (ref 30–36.5)
MCV RBC AUTO: 113.1 FL (ref 80–99)
MONOCYTES # BLD: 0.3 K/UL (ref 0–1)
MONOCYTES NFR BLD: 3 % (ref 5–13)
NEUTS SEG # BLD: 7.7 K/UL (ref 1.8–8)
NEUTS SEG NFR BLD: 90 % (ref 32–75)
NITRITE UR QL STRIP.AUTO: NEGATIVE
NRBC # BLD: 1.08 K/UL (ref 0–0.01)
NRBC BLD-RTO: 12.6 PER 100 WBC
P-R INTERVAL, ECG05: 146 MS
PH UR STRIP: 7.5 [PH] (ref 5–8)
PLATELET # BLD AUTO: 314 K/UL (ref 150–400)
PMV BLD AUTO: 11.9 FL (ref 8.9–12.9)
POTASSIUM SERPL-SCNC: 3.2 MMOL/L (ref 3.5–5.1)
PROT SERPL-MCNC: 10 G/DL (ref 6.4–8.2)
PROT UR STRIP-MCNC: 100 MG/DL
Q-T INTERVAL, ECG07: 390 MS
QRS DURATION, ECG06: 80 MS
QTC CALCULATION (BEZET), ECG08: 497 MS
RBC # BLD AUTO: 2.44 M/UL (ref 3.8–5.2)
RBC #/AREA URNS HPF: ABNORMAL /HPF (ref 0–5)
RBC MORPH BLD: ABNORMAL
RBC MORPH BLD: ABNORMAL
RETICS # AUTO: 0.08 M/UL (ref 0.02–0.08)
RETICS/RBC NFR AUTO: 3.1 % (ref 0.7–2.1)
SODIUM SERPL-SCNC: 139 MMOL/L (ref 136–145)
SP GR UR REFRACTOMETRY: 1.02 (ref 1–1.03)
UA: UC IF INDICATED,UAUC: ABNORMAL
UROBILINOGEN UR QL STRIP.AUTO: 1 EU/DL (ref 0.2–1)
VENTRICULAR RATE, ECG03: 98 BPM
WBC # BLD AUTO: 8.6 K/UL (ref 3.6–11)
WBC URNS QL MICRO: ABNORMAL /HPF (ref 0–4)

## 2020-07-04 PROCEDURE — 93005 ELECTROCARDIOGRAM TRACING: CPT

## 2020-07-04 PROCEDURE — 80053 COMPREHEN METABOLIC PANEL: CPT

## 2020-07-04 PROCEDURE — 71045 X-RAY EXAM CHEST 1 VIEW: CPT

## 2020-07-04 PROCEDURE — 74011250637 HC RX REV CODE- 250/637: Performed by: EMERGENCY MEDICINE

## 2020-07-04 PROCEDURE — 74011250637 HC RX REV CODE- 250/637: Performed by: INTERNAL MEDICINE

## 2020-07-04 PROCEDURE — 74011250636 HC RX REV CODE- 250/636: Performed by: EMERGENCY MEDICINE

## 2020-07-04 PROCEDURE — 83735 ASSAY OF MAGNESIUM: CPT

## 2020-07-04 PROCEDURE — 96374 THER/PROPH/DIAG INJ IV PUSH: CPT

## 2020-07-04 PROCEDURE — 81001 URINALYSIS AUTO W/SCOPE: CPT

## 2020-07-04 PROCEDURE — 65660000000 HC RM CCU STEPDOWN

## 2020-07-04 PROCEDURE — 74011000258 HC RX REV CODE- 258: Performed by: EMERGENCY MEDICINE

## 2020-07-04 PROCEDURE — 96375 TX/PRO/DX INJ NEW DRUG ADDON: CPT

## 2020-07-04 PROCEDURE — 99285 EMERGENCY DEPT VISIT HI MDM: CPT

## 2020-07-04 PROCEDURE — 85025 COMPLETE CBC W/AUTO DIFF WBC: CPT

## 2020-07-04 PROCEDURE — 74011250636 HC RX REV CODE- 250/636: Performed by: INTERNAL MEDICINE

## 2020-07-04 PROCEDURE — 83605 ASSAY OF LACTIC ACID: CPT

## 2020-07-04 PROCEDURE — 85045 AUTOMATED RETICULOCYTE COUNT: CPT

## 2020-07-04 PROCEDURE — 96376 TX/PRO/DX INJ SAME DRUG ADON: CPT

## 2020-07-04 PROCEDURE — 36415 COLL VENOUS BLD VENIPUNCTURE: CPT

## 2020-07-04 PROCEDURE — 87040 BLOOD CULTURE FOR BACTERIA: CPT

## 2020-07-04 RX ORDER — DIPHENHYDRAMINE HCL 25 MG
25 CAPSULE ORAL
Status: DISCONTINUED | OUTPATIENT
Start: 2020-07-04 | End: 2020-07-15 | Stop reason: HOSPADM

## 2020-07-04 RX ORDER — PANTOPRAZOLE SODIUM 40 MG/1
40 TABLET, DELAYED RELEASE ORAL
Status: DISCONTINUED | OUTPATIENT
Start: 2020-07-04 | End: 2020-07-15 | Stop reason: HOSPADM

## 2020-07-04 RX ORDER — HYDROMORPHONE HYDROCHLORIDE 1 MG/ML
1 INJECTION, SOLUTION INTRAMUSCULAR; INTRAVENOUS; SUBCUTANEOUS
Status: COMPLETED | OUTPATIENT
Start: 2020-07-04 | End: 2020-07-04

## 2020-07-04 RX ORDER — HYDROCODONE BITARTRATE AND ACETAMINOPHEN 10; 325 MG/1; MG/1
1 TABLET ORAL
Status: DISCONTINUED | OUTPATIENT
Start: 2020-07-04 | End: 2020-07-06

## 2020-07-04 RX ORDER — CITALOPRAM 20 MG/1
10 TABLET, FILM COATED ORAL
Status: DISCONTINUED | OUTPATIENT
Start: 2020-07-04 | End: 2020-07-15 | Stop reason: HOSPADM

## 2020-07-04 RX ORDER — SODIUM CHLORIDE 9 MG/ML
75 INJECTION, SOLUTION INTRAVENOUS CONTINUOUS
Status: DISCONTINUED | OUTPATIENT
Start: 2020-07-04 | End: 2020-07-06

## 2020-07-04 RX ORDER — ACETAMINOPHEN 650 MG/1
650 SUPPOSITORY RECTAL
Status: COMPLETED | OUTPATIENT
Start: 2020-07-04 | End: 2020-07-04

## 2020-07-04 RX ORDER — SODIUM CHLORIDE 0.9 % (FLUSH) 0.9 %
5-10 SYRINGE (ML) INJECTION AS NEEDED
Status: DISCONTINUED | OUTPATIENT
Start: 2020-07-04 | End: 2020-07-15 | Stop reason: HOSPADM

## 2020-07-04 RX ORDER — POTASSIUM CHLORIDE 7.45 MG/ML
10 INJECTION INTRAVENOUS
Status: DISPENSED | OUTPATIENT
Start: 2020-07-04 | End: 2020-07-04

## 2020-07-04 RX ORDER — HYDROXYUREA 500 MG/1
500 CAPSULE ORAL 2 TIMES DAILY
Status: DISCONTINUED | OUTPATIENT
Start: 2020-07-04 | End: 2020-07-15 | Stop reason: HOSPADM

## 2020-07-04 RX ORDER — SODIUM CHLORIDE 0.9 % (FLUSH) 0.9 %
5-40 SYRINGE (ML) INJECTION EVERY 8 HOURS
Status: DISCONTINUED | OUTPATIENT
Start: 2020-07-04 | End: 2020-07-15 | Stop reason: HOSPADM

## 2020-07-04 RX ORDER — DIPHENHYDRAMINE HYDROCHLORIDE 50 MG/ML
25 INJECTION, SOLUTION INTRAMUSCULAR; INTRAVENOUS
Status: COMPLETED | OUTPATIENT
Start: 2020-07-04 | End: 2020-07-04

## 2020-07-04 RX ORDER — ONDANSETRON 2 MG/ML
4 INJECTION INTRAMUSCULAR; INTRAVENOUS
Status: DISCONTINUED | OUTPATIENT
Start: 2020-07-04 | End: 2020-07-15 | Stop reason: HOSPADM

## 2020-07-04 RX ORDER — LOSARTAN POTASSIUM 25 MG/1
50 TABLET ORAL
Status: DISCONTINUED | OUTPATIENT
Start: 2020-07-04 | End: 2020-07-15 | Stop reason: HOSPADM

## 2020-07-04 RX ORDER — POTASSIUM CHLORIDE 7.45 MG/ML
10 INJECTION INTRAVENOUS
Status: COMPLETED | OUTPATIENT
Start: 2020-07-04 | End: 2020-07-04

## 2020-07-04 RX ORDER — ONDANSETRON 2 MG/ML
4 INJECTION INTRAMUSCULAR; INTRAVENOUS
Status: COMPLETED | OUTPATIENT
Start: 2020-07-04 | End: 2020-07-04

## 2020-07-04 RX ORDER — SODIUM CHLORIDE 0.9 % (FLUSH) 0.9 %
5-40 SYRINGE (ML) INJECTION AS NEEDED
Status: DISCONTINUED | OUTPATIENT
Start: 2020-07-04 | End: 2020-07-15 | Stop reason: HOSPADM

## 2020-07-04 RX ORDER — HYDROMORPHONE HYDROCHLORIDE 1 MG/ML
1 INJECTION, SOLUTION INTRAMUSCULAR; INTRAVENOUS; SUBCUTANEOUS
Status: DISCONTINUED | OUTPATIENT
Start: 2020-07-04 | End: 2020-07-07

## 2020-07-04 RX ORDER — ACETAMINOPHEN 325 MG/1
650 TABLET ORAL
Status: DISCONTINUED | OUTPATIENT
Start: 2020-07-04 | End: 2020-07-15 | Stop reason: HOSPADM

## 2020-07-04 RX ORDER — METRONIDAZOLE 500 MG/100ML
500 INJECTION, SOLUTION INTRAVENOUS EVERY 8 HOURS
Status: DISCONTINUED | OUTPATIENT
Start: 2020-07-04 | End: 2020-07-06

## 2020-07-04 RX ORDER — DOCUSATE SODIUM 100 MG/1
100 CAPSULE, LIQUID FILLED ORAL
Status: DISCONTINUED | OUTPATIENT
Start: 2020-07-04 | End: 2020-07-15 | Stop reason: HOSPADM

## 2020-07-04 RX ORDER — HEPARIN SODIUM 5000 [USP'U]/ML
5000 INJECTION, SOLUTION INTRAVENOUS; SUBCUTANEOUS EVERY 8 HOURS
Status: DISCONTINUED | OUTPATIENT
Start: 2020-07-04 | End: 2020-07-15 | Stop reason: HOSPADM

## 2020-07-04 RX ORDER — ONDANSETRON 2 MG/ML
4 INJECTION INTRAMUSCULAR; INTRAVENOUS
Status: DISCONTINUED | OUTPATIENT
Start: 2020-07-04 | End: 2020-07-04

## 2020-07-04 RX ADMIN — METRONIDAZOLE 500 MG: 500 INJECTION, SOLUTION INTRAVENOUS at 16:36

## 2020-07-04 RX ADMIN — ACETAMINOPHEN 650 MG: 650 SUPPOSITORY RECTAL at 07:49

## 2020-07-04 RX ADMIN — POTASSIUM CHLORIDE 10 MEQ: 7.46 INJECTION, SOLUTION INTRAVENOUS at 18:30

## 2020-07-04 RX ADMIN — SODIUM CHLORIDE 100 ML/HR: 900 INJECTION, SOLUTION INTRAVENOUS at 14:02

## 2020-07-04 RX ADMIN — HYDROMORPHONE HYDROCHLORIDE 1 MG: 1 INJECTION, SOLUTION INTRAMUSCULAR; INTRAVENOUS; SUBCUTANEOUS at 13:59

## 2020-07-04 RX ADMIN — DOCUSATE SODIUM 100 MG: 100 CAPSULE, LIQUID FILLED ORAL at 17:03

## 2020-07-04 RX ADMIN — HYDROCODONE BITARTRATE AND ACETAMINOPHEN 1 TABLET: 10; 325 TABLET ORAL at 21:54

## 2020-07-04 RX ADMIN — ONDANSETRON 4 MG: 2 INJECTION INTRAMUSCULAR; INTRAVENOUS at 07:42

## 2020-07-04 RX ADMIN — DIPHENHYDRAMINE HYDROCHLORIDE 25 MG: 50 INJECTION, SOLUTION INTRAMUSCULAR; INTRAVENOUS at 07:43

## 2020-07-04 RX ADMIN — POTASSIUM CHLORIDE 10 MEQ: 7.46 INJECTION, SOLUTION INTRAVENOUS at 13:00

## 2020-07-04 RX ADMIN — PANTOPRAZOLE SODIUM 40 MG: 40 TABLET, DELAYED RELEASE ORAL at 21:54

## 2020-07-04 RX ADMIN — Medication 10 ML: at 16:14

## 2020-07-04 RX ADMIN — POTASSIUM CHLORIDE 10 MEQ: 7.46 INJECTION, SOLUTION INTRAVENOUS at 20:17

## 2020-07-04 RX ADMIN — HYDROMORPHONE HYDROCHLORIDE 1 MG: 1 INJECTION, SOLUTION INTRAMUSCULAR; INTRAVENOUS; SUBCUTANEOUS at 18:22

## 2020-07-04 RX ADMIN — CITALOPRAM HYDROBROMIDE 10 MG: 20 TABLET ORAL at 21:54

## 2020-07-04 RX ADMIN — POTASSIUM CHLORIDE 10 MEQ: 7.46 INJECTION, SOLUTION INTRAVENOUS at 16:12

## 2020-07-04 RX ADMIN — HYDROXYUREA 500 MG: 500 CAPSULE ORAL at 17:23

## 2020-07-04 RX ADMIN — SODIUM CHLORIDE 1000 ML: 900 INJECTION, SOLUTION INTRAVENOUS at 09:28

## 2020-07-04 RX ADMIN — HYDROMORPHONE HYDROCHLORIDE 1 MG: 1 INJECTION, SOLUTION INTRAMUSCULAR; INTRAVENOUS; SUBCUTANEOUS at 09:29

## 2020-07-04 RX ADMIN — HYDROMORPHONE HYDROCHLORIDE 1 MG: 1 INJECTION, SOLUTION INTRAMUSCULAR; INTRAVENOUS; SUBCUTANEOUS at 07:45

## 2020-07-04 RX ADMIN — HYDROMORPHONE HYDROCHLORIDE 1 MG: 1 INJECTION, SOLUTION INTRAMUSCULAR; INTRAVENOUS; SUBCUTANEOUS at 22:30

## 2020-07-04 RX ADMIN — SODIUM CHLORIDE 1000 ML: 900 INJECTION, SOLUTION INTRAVENOUS at 07:40

## 2020-07-04 RX ADMIN — HEPARIN SODIUM 5000 UNITS: 5000 INJECTION INTRAVENOUS; SUBCUTANEOUS at 13:59

## 2020-07-04 RX ADMIN — ONDANSETRON 4 MG: 2 INJECTION INTRAMUSCULAR; INTRAVENOUS at 18:04

## 2020-07-04 RX ADMIN — Medication 10 ML: at 21:55

## 2020-07-04 RX ADMIN — SODIUM CHLORIDE 55 ML: 900 INJECTION, SOLUTION INTRAVENOUS at 09:02

## 2020-07-04 RX ADMIN — DIPHENHYDRAMINE HYDROCHLORIDE 25 MG: 25 CAPSULE ORAL at 18:04

## 2020-07-04 RX ADMIN — CEFTRIAXONE 1 G: 1 INJECTION, POWDER, FOR SOLUTION INTRAMUSCULAR; INTRAVENOUS at 12:44

## 2020-07-04 RX ADMIN — HEPARIN SODIUM 5000 UNITS: 5000 INJECTION INTRAVENOUS; SUBCUTANEOUS at 20:18

## 2020-07-04 RX ADMIN — LOSARTAN POTASSIUM 50 MG: 25 TABLET, FILM COATED ORAL at 21:54

## 2020-07-04 NOTE — ED TRIAGE NOTES
Asumed care of pt form EMS. Pt came form home and has CC of abd pain (mid upper abd), n/v, HA on left side starting yesterday. Pt has hx of sickle cell and HTN. Pt did not take her HTN meds today. Pt states that her vomiting has thrown her umesh a sickle cell crisis and she has pain all over. Pt on monitor x3. VSS. Call bell in reach.

## 2020-07-04 NOTE — PROGRESS NOTES
Bedside and Verbal shift change report given to ELIJAH Quinn (oncoming nurse) by VITO Adams (offgoing nurse).  Report included the following information SBAR, Kardex and Cardiac Rhythm NSR

## 2020-07-04 NOTE — ED PROVIDER NOTES
EMERGENCY DEPARTMENT HISTORY AND PHYSICAL EXAM      Date: 7/4/2020  Patient Name: Silver Bernstein    History of Presenting Illness     Chief Complaint   Patient presents with    Abdominal Pain       History Provided By: Patient    HPI: Silver Bernstein, 64 y.o. female presents to the ED with history of sickle cell disease, SC hemoglobinopathy, previously treated hepatitis C, hypertension, depression, status post cholecystectomy here with abdominal pain after onset of acute nausea and vomiting yesterday after attending a small, socially distance luncheon. She ate fish and some other side dishes and became ill soon after. She has vomited multiple times, has had some bowel movements but no change in terms of loose stool or constipation or bleeding. She was found on the second floor bathroom floor where she had spent most of the night and says that because of her multiple episodes of vomiting, her sickle cell disease has flared up and she is experiencing pain in her legs hips and shoulder areas. She says last time she had a sickle cell flare was in January of this year. The patient was admitted January 20, 2020 was having nausea vomiting and diarrhea at that time. It was thought that maybe she was having generalized pain from presumed sickle cell flare that was also induced by GI symptoms. There are no other complaints, changes, or physical findings at this time. PCP: Lianna Mckeon MD    No current facility-administered medications on file prior to encounter. Current Outpatient Medications on File Prior to Encounter   Medication Sig Dispense Refill    omeprazole (PRILOSEC OTC) 20 mg tablet Take 20 mg by mouth nightly.  HYDROcodone-acetaminophen (NORCO)  mg tablet Take 1 Tab by mouth every six (6) hours as needed for Pain for up to 30 days. Max Daily Amount: 4 Tabs. [id number].D57.1 120 Tab 0    promethazine (PHENERGAN) 25 mg tablet Take 1 Tab by mouth every eight (8) hours as needed for Nausea. Over the last 2 weeks, how often have you been bothered by the following problems?         PHQ2 Score:  2  1. Little interest or pleasure in doing things?:  Several days  2. Feeling down, depressed, or hopeless?:  Several days     PHQ9 Score:  11  3. Trouble falling, staying asleep or sleeping too much?:  More than half the days  4. Feeling tired or having little energy?:  Not at all  5. Poor appetite or overeating?:  More than half the days  6. Feeling bad about yourself - or that you are a failure or that you have let yourself or your family down?:  Several days  7. Trouble concentrating on things such as reading a newspaper or watching television?:  Several days  8. Moving or speaking so slowly that other people could have noticed? Or the opposite - being so fidgety or restless that you were moving around a lot more than usual?:  More than half the days  9. Thoughts that you would be better off dead, or of hurting yourself in some way?:  Not at all     If you checked off any problems, how difficult have these problems made it for you to do your work, take care of tings at home, or get along with other people?:  somewhat difficult  0 - 4 = Normal  5 - 9 = Mild Symptoms  10 - 14 = Moderate Symptoms  15 - 19 = Moderate - Severe Symptoms  20 - 27 = Severe Symptoms       Generalized Anxiety Disorder (GAD7)    Over the last 2 weeks, how often have you been bothered by the following problems?   Score:  6    Score:   0-4 minimal symptoms 10-14 moderate symptoms   5-9 mild symptoms 15-21 severe symptoms      1.  Feeling nervous, anxious or on edge Several days   2.  Not being able to stop or control worrying Not at all   3.  Worrying too much about different things Several days   4.  Trouble relaxing More than half the days   5.  Being so restless that it's hard to sit still More than half the days   6.  Becoming easily annoyed or irritable Not at all   7.  Feeling afraid as if something awful might happen Not at all             If you checked off any problems, how difficult have these made it for you to do your work, take care of things at home, or get along with other people?            90 Tab 11    citalopram (CELEXA) 10 mg tablet TAKE 1 TABLET BY MOUTH EVERY NIGHT AT BEDTIME 90 Tab 3    [DISCONTINUED] multivitamin (ONE A DAY) tablet Take 1 Tab by mouth daily.  hydroxyurea (HYDREA) 500 mg capsule 1 tablet two times a day 60 Cap 11    hydrocortisone (CORTISONE) 1 % topical cream Apply  to affected area two (2) times daily as needed for Skin Irritation. use thin layer 30 g 11    telmisartan (MICARDIS) 40 mg tablet Take 1 Tab by mouth nightly. 30 Tab 11    [DISCONTINUED] folic acid (FOLVITE) 1 mg tablet Take 1 mg by mouth nightly. Past History     Past Medical History:  Past Medical History:   Diagnosis Date    Anemia     SC hemoglobinopathy    Chronic pain     Depression     Hypertension     Liver disease     hepatitis C; pt reports \"cured\"    Sickle cell disease ([facility] [address]        Past Surgical History:  Past Surgical History:   Procedure Laterality Date    BREAST SURGERY PROCEDURE UNLISTED      2 cysts removed from R breast    CHEST SURGERY PROCEDURE UNLISTED      status post thor for removal of a benign     HX BREAST BIOPSY Right 05/2007    negative    HX CHOLECYSTECTOMY      HX ORTHOPAEDIC      bony curettage of an ostial myelitis focus       Family History:  Family History   Problem Relation Age of Onset    Hypertension Mother     Hypertension Father        Social History:  Social History     Tobacco Use    Smoking status: Never Smoker    Smokeless tobacco: Never Used   Substance Use Topics    Alcohol use: No    Drug use: No       Allergies: Allergies   Allergen Reactions    Dilaudid [Hydromorphone (Bulk)] Itching     Can tolerate with benadryl and ondansetron    Percocet [Oxycodone-Acetaminophen] Itching         Review of Systems   Review of Systems   Constitutional: Positive for appetite change. Negative for activity change, chills, diaphoresis and fatigue. Staff reports fever of 101.1 here today. HENT: Negative for congestion and sore throat. Respiratory: Negative for cough, chest tightness, shortness of breath and wheezing. Patient does not know of any contacts with COVID-19. Cardiovascular: Negative for chest pain, palpitations and leg swelling. Gastrointestinal: Positive for abdominal pain, nausea and vomiting. Negative for blood in stool, constipation and diarrhea. Genitourinary: Negative for difficulty urinating, frequency and urgency. Musculoskeletal: Positive for joint swelling. Negative for neck pain and neck stiffness. Neurological: Negative for dizziness and light-headedness. Hematological: Does not bruise/bleed easily. Patient is not on any anticoagulation. All other systems reviewed and are negative. Physical Exam   Physical Exam   Vital signs and nursing notes reviewed    CONSTITUTIONAL: Alert, in moderate distress; well-developed; well-nourished. Laying right lateral.  HEAD:  Normocephalic, atraumatic  EYES: PERRL; EOM's intact. Nonicteric sclera. ENTM: Nose: no rhinorrhea; Throat: no erythema or exudate, mucous membranes moist  Neck:  Supple. trachea is midline. RESP: Chest clear, equal breath sounds. - W/R/R  CV: S1 and S2 WNL; No murmurs, gallops or rubs. 2+ radial and DP pulses bilaterally. GI: non-distended, normal bowel sounds, abdomen soft with generalized abdominal tenderness. No masses or organomegaly. : No costo-vertebral angle tenderness. BACK:  Non-tender, normal appearance  UPPER EXT:  Normal inspection. no joint or soft tissue swelling  LOWER EXT: No edema, no calf tenderness. NEURO: Alert and oriented x3, 5/5 strength and light touch sensation intact in bilateral upper and lower extremities. SKIN: No rashes; Warm and dry, nonjaundiced skin.   PSYCH: Normal mood, normal affect    Diagnostic Study Results     Labs -     Recent Results (from the past 12 hour(s))   METABOLIC PANEL, COMPREHENSIVE    Collection Time: 07/04/20  7:38 AM   Result Value Ref Range    Sodium 139 136 - 145 mmol/L    Potassium 3.2 (L) 3.5 - 5.1 mmol/L    Chloride 105 97 - 108 mmol/L    CO2 24 21 - 32 mmol/L    Anion gap 10 5 - 15 mmol/L    Glucose 144 (H) 65 - 100 mg/dL    BUN 12 6 - 20 MG/DL    Creatinine 1.00 0.55 - 1.02 MG/DL    BUN/Creatinine ratio 12 12 - 20      GFR est AA >60 >60 ml/min/1.73m2    GFR est non-AA 57 (L) >60 ml/min/1.73m2    Calcium 9.3 8.5 - 10.1 MG/DL    Bilirubin, total 1.5 (H) 0.2 - 1.0 MG/DL    ALT (SGPT) 19 12 - 78 U/L    AST (SGOT) 33 15 - 37 U/L    Alk. phosphatase 123 (H) 45 - 117 U/L    Protein, total 10.0 (H) 6.4 - 8.2 g/dL    Albumin 4.2 3.5 - 5.0 g/dL    Globulin 5.8 (H) 2.0 - 4.0 g/dL    A-G Ratio 0.7 (L) 1.1 - 2.2     CBC WITH AUTOMATED DIFF    Collection Time: 07/04/20  7:38 AM   Result Value Ref Range    WBC 8.6 3.6 - 11.0 K/uL    RBC 2.44 (L) 3.80 - 5.20 M/uL    HGB 10.1 (L) 11.5 - 16.0 g/dL    HCT 27.6 (L) 35.0 - 47.0 %    .1 (H) 80.0 - 99.0 FL    MCH 41.4 (H) 26.0 - 34.0 PG    MCHC 36.6 (H) 30.0 - 36.5 g/dL    RDW 13.8 11.5 - 14.5 %    PLATELET 886 126 - 463 K/uL    MPV 11.9 8.9 - 12.9 FL    NRBC 12.6 (H) 0  WBC    ABSOLUTE NRBC 1.08 (H) 0.00 - 0.01 K/uL    NEUTROPHILS 90 (H) 32 - 75 %    LYMPHOCYTES 7 (L) 12 - 49 %    MONOCYTES 3 (L) 5 - 13 %    EOSINOPHILS 0 0 - 7 %    BASOPHILS 0 0 - 1 %    IMMATURE GRANULOCYTES 0 0.0 - 0.5 %    ABS. NEUTROPHILS 7.7 1.8 - 8.0 K/UL    ABS. LYMPHOCYTES 0.6 (L) 0.8 - 3.5 K/UL    ABS. MONOCYTES 0.3 0.0 - 1.0 K/UL    ABS. EOSINOPHILS 0.0 0.0 - 0.4 K/UL    ABS. BASOPHILS 0.0 0.0 - 0.1 K/UL    ABS. IMM.  GRANS. 0.0 0.00 - 0.04 K/UL    DF MANUAL      RBC COMMENTS MACROCYTOSIS  2+        RBC COMMENTS TARGET CELLS  PRESENT       RETICULOCYTE COUNT    Collection Time: 07/04/20  7:38 AM   Result Value Ref Range    Reticulocyte count 3.1 (H) 0.7 - 2.1 %    Absolute Retic Cnt. 0.0753 0.0164 - 0.0776 M/ul   MAGNESIUM    Collection Time: 07/04/20  7:38 AM   Result Value Ref Range    Magnesium 2.4 1.6 - 2.4 mg/dL   POC LACTIC ACID    Collection Time: 07/04/20  7:40 AM   Result Value Ref Range    Lactic Acid (POC) 2.09 (HH) 0.40 - 2.00 mmol/L   EKG, 12 LEAD, INITIAL    Collection Time: 07/04/20  7:50 AM   Result Value Ref Range    Ventricular Rate 98 BPM    Atrial Rate 98 BPM    P-R Interval 146 ms    QRS Duration 80 ms    Q-T Interval 390 ms    QTC Calculation (Bezet) 497 ms    Calculated P Axis 51 degrees    Calculated R Axis 49 degrees    Calculated T Axis 29 degrees    Diagnosis       Normal sinus rhythm with sinus arrhythmia  Moderate voltage criteria for LVH, may be normal variant  Prolonged QT  When compared with ECG of 27-OCT-2019 07:33,  No significant change was found     POC LACTIC ACID    Collection Time: 07/04/20 10:56 AM   Result Value Ref Range    Lactic Acid (POC) 0.82 0.40 - 2.00 mmol/L   URINALYSIS W/ REFLEX CULTURE    Collection Time: 07/04/20 11:26 AM   Result Value Ref Range    Color YELLOW/STRAW      Appearance CLEAR CLEAR      Specific gravity 1.016 1.003 - 1.030      pH (UA) 7.5 5.0 - 8.0      Protein 100 (A) NEG mg/dL    Glucose Negative NEG mg/dL    Ketone 15 (A) NEG mg/dL    Blood Negative NEG      Urobilinogen 1.0 0.2 - 1.0 EU/dL    Nitrites Negative NEG      Leukocyte Esterase MODERATE (A) NEG      WBC 5-10 0 - 4 /hpf    RBC 5-10 0 - 5 /hpf    Epithelial cells FEW FEW /lpf    Bacteria 1+ (A) NEG /hpf    UA:UC IF INDICATED CULTURE NOT INDICATED BY UA RESULT CNI     BILIRUBIN, CONFIRM    Collection Time: 07/04/20 11:26 AM   Result Value Ref Range    Bilirubin UA, confirm Negative NEG         Radiologic Studies -   XR CHEST PORT   Final Result   IMPRESSION: No acute abnormality is identified. CT Results  (Last 48 hours)    None        CXR Results  (Last 48 hours)               07/04/20 0837  XR CHEST PORT Final result    Impression:  IMPRESSION: No acute abnormality is identified.                Narrative:  EXAM:  XR CHEST PORT       INDICATION:  meets SIRS criteria       COMPARISON:  2020       FINDINGS: A portable AP radiograph of the chest was obtained at 829 hours. The   patient is on a cardiac monitor. Lungs are clear of an acute process. There is   slight scarring right perihilar region. .  The cardiac and mediastinal contours   and pulmonary vascularity are normal.  There are degenerative changes in the   shoulders. .                  Medical Decision Making   I am the first provider for this patient. I reviewed the vital signs, available nursing notes, past medical history, past surgical history, family history and social history. Vital Signs-Reviewed the patient's vital signs. Patient Vitals for the past 12 hrs:   Temp Pulse Resp BP SpO2   07/04/20 1230  88 18 129/79 99 %   07/04/20 1200  94 24 131/72 100 %   07/04/20 1130  87 16 131/71 99 %   07/04/20 1030 99.1 °F (37.3 °C) 93 18  99 %   07/04/20 0930  (!) 104 19 154/87    07/04/20 0926  (!) 108 19 (!) 142/95    07/04/20 0815  93 14 (!) 191/107 100 %   07/04/20 0753  98 15 (!) 203/106 100 %   07/04/20 0708     100 %   07/04/20 0657 (!) 101.1 °F (38.4 °C) 99 18 167/87 100 %       EKG interpretation: (Preliminary)  EKG performed at 7:50 AM shows a normal sinus rhythm at a rate of 98, normal axis, normal QRS interval but prolonged QT.,  Meets voltage criteria for LVH, no visible acute ischemic changes. Records Reviewed: Nursing Notes, Old Medical Records, Previous Radiology Studies and Previous Laboratory Studies    Provider Notes (Medical Decision Making):   28-year-old female with acute onset nausea vomiting with abdominal pain, generalized, low suspicion for free air with acute joint pain likely secondary to sickle cell crisis. Patient has fever which could be from bacterial food poisoning, no focal tenderness on abdominal exam to suggest appendicitis, patient previously had gallbladder removed. Low suspicion for COVID-19 but should consider if work-up is otherwise negative.   Plan for pain control along with Benadryl and nausea medication which patient requires previously as well as initial lab evaluation. Will screen for urinary tract infection, no URI symptoms to suggest pneumonia. ED Course:   Initial assessment performed. The patients presenting problems have been discussed, and they are in agreement with the care plan formulated and outlined with them. I have encouraged them to ask questions as they arise throughout their visit. ED Course as of Jul 04 1240   Sat Jul 04, 2020   4990 Patient had lost IV access, another IV placed left hand. Additional pain medication reordered. IV fluids infusing. [TL]      ED Course User Index  [TL] Toney Zazueta MD     12:38 PM  Updated patient on need for antibiotics, possible UTI and plan for admission due to concerns for sepsis. Patient's pain improved after initial pain medication but going back up again. Will order additional medications. Second lactate reassuring after IV fluids. 12:40 PM - I suspect that this patient has an active infection. 12:40 PM - The patient met criteria for severe sepsis at this time.       PROVIDER SEPSIS PHYSICAL EXAM EVAL  Vital signs reviewed (see nursing documentation for further details):  Vitals:    07/04/20 1030 07/04/20 1130 07/04/20 1200 07/04/20 1230   BP:  131/71 131/72 129/79   Pulse: 93 87 94 88   Resp: 18 16 24 18   Temp: 99.1 °F (37.3 °C)      SpO2: 99% 99% 100% 99%       Cardiac exam:Regular Rate    Pulmonary exam:Normal    Peripheral pulses:Normal    Capillary refill:Normal    Skin exam:pink    Exam performed byMarcela Skinner MD    CRITICAL CARE NOTE :    12:40 PM      IMPENDING DETERIORATION -Cardiovascular and Metabolic    ASSOCIATED RISK FACTORS - Hypotension, Metabolic changes and Dehydration    MANAGEMENT- Bedside Assessment and Supervision of Care    INTERPRETATION -  ECG, Blood Pressure and Cardiac Output Measures     INTERVENTIONS - hemodynamic mngmt and antibiotic management for sepsis    CASE REVIEW - Hospitalist and Nursing    TREATMENT RESPONSE -Stable    PERFORMED BY - Self        NOTES   :      I have spent 43 minutes of critical care time involved in lab review, consultations with specialist, family decision- making, bedside attention and documentation. During this entire length of time I was immediately available to the patient . Jc Obando MD    Disposition:  Admit    Admit Note:  12:38 PM  Pt is being admitted by Dr. Sachi Wick  . The results of their tests and reason(s) for their admission have been discussed with pt and/or available family. They convey agreement and understanding for the need to be admitted and for admission diagnosis. Diagnosis     Clinical Impression:   1. Acute cystitis without hematuria    2. SIRS (systemic inflammatory response syndrome) (HCC)    3. Sickle cell crisis [facility])        Attestations:    Jc Obando MD    Please note that this dictation was completed with [employer], the computer voice recognition software. Quite often unanticipated grammatical, syntax, homophones, and other interpretive errors are inadvertently transcribed by the computer software. Please disregard these errors. Please excuse any errors that have escaped final proofreading. Thank you.

## 2020-07-04 NOTE — ED NOTES
TRANSFER - OUT REPORT:    Verbal report given to Angie(name) on Kristofer Abdullahi  being transferred to [facility](unit) for routine progression of care       Report consisted of patients Situation, Background, Assessment and   Recommendations(SBAR). Information from the following report(s) SBAR, Kardex, ED Summary, Procedure Summary, Intake/Output, MAR, Accordion, Recent Results, Med Rec Status and Cardiac Rhythm NSR was reviewed with the receiving nurse. Lines:   Peripheral IV 07/04/20 Right Antecubital (Active)       Peripheral IV 07/04/20 Right Antecubital (Active)        Opportunity for questions and clarification was provided.       Patient transported with:   IV pump

## 2020-07-04 NOTE — PROGRESS NOTES
Dr Jammie Castillo was notified via [employer] that the pt takes Benadryl & Zofran with dilaudid (pt is allergic to dilaudid) however, the Benadryl was not ordered for the patient. Dr Jammie Castillo responded to give the patient 25 mg of oral benadryl every four hours as needed.

## 2020-07-04 NOTE — ED NOTES
Bedside and Verbal shift change report given to Javed Desai (oncoming nurse) by Krista Noguera (offgoing nurse). Report included the following information SBAR, ED Summary, MAR and Recent Results.

## 2020-07-04 NOTE — PROGRESS NOTES
Problem: Falls - Risk of  Goal: *Absence of Falls  Description: Document Alexander Boswell Fall Risk and appropriate interventions in the flowsheet.   Outcome: Progressing Towards Goal  Note: Fall Risk Interventions:            Medication Interventions: Patient to call before getting OOB, Teach patient to arise slowly                   Problem: Patient Education: Go to Patient Education Activity  Goal: Patient/Family Education  Outcome: Progressing Towards Goal

## 2020-07-04 NOTE — ED NOTES
Pt reports her iv blew  Took iv out  Applied coban to left arm  Pt reports she did however feel the medicine  Dr. Shailesh Miner aware  Samantha Toribio will try for US iv line, the left iv was an US iv line

## 2020-07-05 LAB
ANION GAP SERPL CALC-SCNC: 6 MMOL/L (ref 5–15)
BASOPHILS # BLD: 0 K/UL (ref 0–0.1)
BASOPHILS NFR BLD: 0 % (ref 0–1)
BUN SERPL-MCNC: 9 MG/DL (ref 6–20)
BUN/CREAT SERPL: 10 (ref 12–20)
CALCIUM SERPL-MCNC: 7.9 MG/DL (ref 8.5–10.1)
CHLORIDE SERPL-SCNC: 108 MMOL/L (ref 97–108)
CO2 SERPL-SCNC: 23 MMOL/L (ref 21–32)
CREAT SERPL-MCNC: 0.92 MG/DL (ref 0.55–1.02)
DIFFERENTIAL METHOD BLD: ABNORMAL
EOSINOPHIL # BLD: 0 K/UL (ref 0–0.4)
EOSINOPHIL NFR BLD: 0 % (ref 0–7)
ERYTHROCYTE [DISTWIDTH] IN BLOOD BY AUTOMATED COUNT: 14.1 % (ref 11.5–14.5)
GLUCOSE SERPL-MCNC: 101 MG/DL (ref 65–100)
HCT VFR BLD AUTO: 24 % (ref 35–47)
HGB BLD-MCNC: 8.2 G/DL (ref 11.5–16)
IMM GRANULOCYTES # BLD AUTO: 0 K/UL (ref 0–0.04)
IMM GRANULOCYTES NFR BLD AUTO: 0 % (ref 0–0.5)
LYMPHOCYTES # BLD: 3.2 K/UL (ref 0.8–3.5)
LYMPHOCYTES NFR BLD: 28 % (ref 12–49)
MCH RBC QN AUTO: 40.8 PG (ref 26–34)
MCHC RBC AUTO-ENTMCNC: 34.2 G/DL (ref 30–36.5)
MCV RBC AUTO: 119.4 FL (ref 80–99)
MONOCYTES # BLD: 0.9 K/UL (ref 0–1)
MONOCYTES NFR BLD: 8 % (ref 5–13)
NEUTS SEG # BLD: 7.3 K/UL (ref 1.8–8)
NEUTS SEG NFR BLD: 64 % (ref 32–75)
NRBC # BLD: 1.34 K/UL (ref 0–0.01)
NRBC BLD-RTO: 11.7 PER 100 WBC
PLATELET # BLD AUTO: 221 K/UL (ref 150–400)
PMV BLD AUTO: 11.9 FL (ref 8.9–12.9)
POTASSIUM SERPL-SCNC: 3.5 MMOL/L (ref 3.5–5.1)
RBC # BLD AUTO: 2.01 M/UL (ref 3.8–5.2)
RBC MORPH BLD: ABNORMAL
SODIUM SERPL-SCNC: 137 MMOL/L (ref 136–145)
WBC # BLD AUTO: 11.4 K/UL (ref 3.6–11)

## 2020-07-05 PROCEDURE — 74011250636 HC RX REV CODE- 250/636: Performed by: INTERNAL MEDICINE

## 2020-07-05 PROCEDURE — 94760 N-INVAS EAR/PLS OXIMETRY 1: CPT

## 2020-07-05 PROCEDURE — 80048 BASIC METABOLIC PNL TOTAL CA: CPT

## 2020-07-05 PROCEDURE — 65660000000 HC RM CCU STEPDOWN

## 2020-07-05 PROCEDURE — 74011000258 HC RX REV CODE- 258: Performed by: INTERNAL MEDICINE

## 2020-07-05 PROCEDURE — 74011250637 HC RX REV CODE- 250/637: Performed by: INTERNAL MEDICINE

## 2020-07-05 PROCEDURE — 87086 URINE CULTURE/COLONY COUNT: CPT

## 2020-07-05 PROCEDURE — 85025 COMPLETE CBC W/AUTO DIFF WBC: CPT

## 2020-07-05 PROCEDURE — 36415 COLL VENOUS BLD VENIPUNCTURE: CPT

## 2020-07-05 RX ADMIN — CITALOPRAM HYDROBROMIDE 10 MG: 20 TABLET ORAL at 21:19

## 2020-07-05 RX ADMIN — HYDROMORPHONE HYDROCHLORIDE 1 MG: 1 INJECTION, SOLUTION INTRAMUSCULAR; INTRAVENOUS; SUBCUTANEOUS at 10:25

## 2020-07-05 RX ADMIN — ONDANSETRON 4 MG: 2 INJECTION INTRAMUSCULAR; INTRAVENOUS at 15:05

## 2020-07-05 RX ADMIN — ONDANSETRON 4 MG: 2 INJECTION INTRAMUSCULAR; INTRAVENOUS at 10:25

## 2020-07-05 RX ADMIN — DIPHENHYDRAMINE HYDROCHLORIDE 25 MG: 25 CAPSULE ORAL at 15:05

## 2020-07-05 RX ADMIN — HYDROXYUREA 500 MG: 500 CAPSULE ORAL at 17:33

## 2020-07-05 RX ADMIN — DIPHENHYDRAMINE HYDROCHLORIDE 25 MG: 25 CAPSULE ORAL at 19:55

## 2020-07-05 RX ADMIN — LOSARTAN POTASSIUM 50 MG: 25 TABLET, FILM COATED ORAL at 21:19

## 2020-07-05 RX ADMIN — SODIUM CHLORIDE 100 ML/HR: 900 INJECTION, SOLUTION INTRAVENOUS at 05:42

## 2020-07-05 RX ADMIN — Medication 10 ML: at 05:43

## 2020-07-05 RX ADMIN — PANTOPRAZOLE SODIUM 40 MG: 40 TABLET, DELAYED RELEASE ORAL at 21:19

## 2020-07-05 RX ADMIN — ONDANSETRON 4 MG: 2 INJECTION INTRAMUSCULAR; INTRAVENOUS at 19:56

## 2020-07-05 RX ADMIN — DIPHENHYDRAMINE HYDROCHLORIDE 25 MG: 25 CAPSULE ORAL at 08:50

## 2020-07-05 RX ADMIN — DIPHENHYDRAMINE HYDROCHLORIDE 25 MG: 25 CAPSULE ORAL at 03:50

## 2020-07-05 RX ADMIN — HYDROCODONE BITARTRATE AND ACETAMINOPHEN 1 TABLET: 10; 325 TABLET ORAL at 05:45

## 2020-07-05 RX ADMIN — HEPARIN SODIUM 5000 UNITS: 5000 INJECTION INTRAVENOUS; SUBCUTANEOUS at 13:03

## 2020-07-05 RX ADMIN — METRONIDAZOLE 500 MG: 500 INJECTION, SOLUTION INTRAVENOUS at 07:57

## 2020-07-05 RX ADMIN — SODIUM CHLORIDE 75 ML/HR: 900 INJECTION, SOLUTION INTRAVENOUS at 20:00

## 2020-07-05 RX ADMIN — HYDROMORPHONE HYDROCHLORIDE 1 MG: 1 INJECTION, SOLUTION INTRAMUSCULAR; INTRAVENOUS; SUBCUTANEOUS at 19:56

## 2020-07-05 RX ADMIN — METRONIDAZOLE 500 MG: 500 INJECTION, SOLUTION INTRAVENOUS at 21:19

## 2020-07-05 RX ADMIN — HYDROMORPHONE HYDROCHLORIDE 1 MG: 1 INJECTION, SOLUTION INTRAMUSCULAR; INTRAVENOUS; SUBCUTANEOUS at 15:05

## 2020-07-05 RX ADMIN — METRONIDAZOLE 500 MG: 500 INJECTION, SOLUTION INTRAVENOUS at 00:12

## 2020-07-05 RX ADMIN — ONDANSETRON 4 MG: 2 INJECTION INTRAMUSCULAR; INTRAVENOUS at 03:50

## 2020-07-05 RX ADMIN — HYDROXYUREA 500 MG: 500 CAPSULE ORAL at 10:25

## 2020-07-05 RX ADMIN — HYDROCODONE BITARTRATE AND ACETAMINOPHEN 1 TABLET: 10; 325 TABLET ORAL at 17:24

## 2020-07-05 RX ADMIN — METRONIDAZOLE 500 MG: 500 INJECTION, SOLUTION INTRAVENOUS at 13:56

## 2020-07-05 RX ADMIN — Medication 10 ML: at 21:19

## 2020-07-05 RX ADMIN — DOCUSATE SODIUM 100 MG: 100 CAPSULE, LIQUID FILLED ORAL at 17:28

## 2020-07-05 RX ADMIN — HEPARIN SODIUM 5000 UNITS: 5000 INJECTION INTRAVENOUS; SUBCUTANEOUS at 05:10

## 2020-07-05 RX ADMIN — HEPARIN SODIUM 5000 UNITS: 5000 INJECTION INTRAVENOUS; SUBCUTANEOUS at 21:19

## 2020-07-05 RX ADMIN — CEFTRIAXONE 1 G: 1 INJECTION, POWDER, FOR SOLUTION INTRAMUSCULAR; INTRAVENOUS at 13:03

## 2020-07-05 RX ADMIN — HYDROMORPHONE HYDROCHLORIDE 1 MG: 1 INJECTION, SOLUTION INTRAMUSCULAR; INTRAVENOUS; SUBCUTANEOUS at 02:46

## 2020-07-05 NOTE — PROGRESS NOTES
Bedside and Verbal shift change report given to LOU Patterson (oncoming nurse) by Mirtha Ferraro (offgoing nurse). Report included the following information SBAR, Kardex and Cardiac Rhythm NSR.

## 2020-07-05 NOTE — PROGRESS NOTES
Hospitalist Progress Note    NAME: Mary Goins   :  1960   MRN:  [id number]     This is a 61-year-old female with past medical history of sickle cell disease presented with abdominal pain, nausea and vomiting and noted to have possible enteritis. Assessment / Plan:  Abdominal pain, diarrhea, nausea and vomiting likely related to enteritis  -Pending stool studies including C. difficile. Follow stool culture results. -LFTs are within normal limits  -Continue IV fluids  -Continue ceftriaxone and Flagyl as well     Possible urinary tract infection  -Follow urine culture results. Continue empiric ceftriaxone t     History of sickle cell disease  -Patient reports increased pain in the joints  -Hemoglobin is 8.2,  reticulocyte count was elevated at 3.1  -Continue home hydroxyurea     Hypokalemia  -Potassium is normal today     Hypertension  Depression  History of GERD  -Continue losartan, Celexa and PPI     Disposition:  Possible discharge in a.m. if better            Code Status: Full code        DVT Prophylaxis: Heparin        Baseline: From home independent      Body mass index is 30.13 kg/m². Subjective:     Chief Complaint / Reason for Physician Visit  Reports feeling better. Diarrhea is improved. No nausea or vomiting. Able to tolerate diet    Review of Systems:  Symptom Y/N Comments  Symptom Y/N Comments   Fever/Chills    Chest Pain     Poor Appetite    Edema     Cough    Abdominal Pain     Sputum    Joint Pain     SOB/MARS    Pruritis/Rash     Nausea/vomit    Tolerating PT/OT     Diarrhea    Tolerating Diet     Constipation    Other       Could NOT obtain due to:      Objective:     VITALS:   Last 24hrs VS reviewed since prior progress note.  Most recent are:  Patient Vitals for the past 24 hrs:   Temp Pulse Resp BP SpO2   20 1628 99.3 °F (37.4 °C) 75 18 147/88 98 %   20 1150 99 °F (37.2 °C) 77 18 144/85 98 %   20 1126  85 18 (!) 157/96 99 %   20 0649 98.6 °F (37 °C) 72 16 125/75 96 %   07/05/20 0400  75      07/05/20 0331 98.7 °F (37.1 °C) 78 16 117/71 100 %   07/05/20 0000  71      07/04/20 2254 98.7 °F (37.1 °C) 71 16 141/84 100 %   07/04/20 2000  77      07/04/20 1843 98.9 °F (37.2 °C) 74 18 144/85 99 %     No intake or output data in the 24 hours ending 07/05/20 1646     PHYSICAL EXAM:  General: WD, WN. Alert, cooperative, no acute distress    EENT:  EOMI. Anicteric sclerae. MMM  Resp:  CTA bilaterally, no wheezing or rales. No accessory muscle use  CV:  Regular  rhythm,  No edema  GI:  Soft, Non distended, Non tender.  +Bowel sounds  Neurologic:  Alert and oriented X 3, normal speech,   Psych:   Good insight. Not anxious nor agitated  Skin:  No rashes.   No jaundice    Reviewed most current lab test results and cultures  YES  Reviewed most current radiology test results   YES  Review and summation of old records today    NO  Reviewed patient's current orders and MAR    YES  PMH/SH reviewed - no change compared to H&P          Current Facility-Administered Medications:     sodium chloride (NS) flush 5-10 mL, 5-10 mL, IntraVENous, PRN, Hugo Hadley MD    cefTRIAXone (ROCEPHIN) 1 g in 0.9% sodium chloride (MBP/ADV) 50 mL, 1 g, IntraVENous, Q24H, Vladimir Whittington MD, Last Rate: 100 mL/hr at 07/05/20 1303, 1 g at 07/05/20 1303    citalopram (CELEXA) tablet 10 mg, 10 mg, Oral, QHS, Vladimir Whittington MD, 10 mg at 07/04/20 2154    HYDROcodone-acetaminophen (NORCO)  mg tablet 1 Tab, 1 Tab, Oral, Q6H PRN, Gina Prieto MD, 1 Tab at 07/05/20 0545    hydroxyurea (HYDREA) chemo cap 500 mg, 500 mg, Oral, BID, Vladimir Whittington MD, 500 mg at 07/05/20 1025    pantoprazole (PROTONIX) tablet 40 mg, 40 mg, Oral, QHS, Vladimir Whittington MD, 40 mg at 07/04/20 2154    losartan (COZAAR) tablet 50 mg, 50 mg, Oral, QHS, Vladimir Whittington MD, 50 mg at 07/04/20 2154    sodium chloride (NS) flush 5-40 mL, 5-40 mL, IntraVENous, Q8H, Hugo Whittington MD, Stopped at 07/05/20 1303    sodium chloride (NS) flush 5-40 mL, 5-40 mL, IntraVENous, PRN, Taj Mendoza MD    acetaminophen (TYLENOL) tablet 650 mg, 650 mg, Oral, Q6H PRN, Taj Mendoza MD    docusate sodium (COLACE) capsule 100 mg, 100 mg, Oral, DAILY PRN, Angel Trinh MD, 100 mg at 07/04/20 1703    heparin (porcine) injection 5,000 Units, 5,000 Units, SubCUTAneous, Q8H, Vladimir Whittington MD, 5,000 Units at 07/05/20 1303    0.9% sodium chloride infusion, 100 mL/hr, IntraVENous, CONTINUOUS, Vladimir Whittington MD, Last Rate: 100 mL/hr at 07/05/20 0542, 100 mL/hr at 07/05/20 0542    HYDROmorphone (PF) (DILAUDID) injection 1 mg, 1 mg, IntraVENous, Q4H PRN, Vladimir Mendoza MD, 1 mg at 07/05/20 1505    metroNIDAZOLE (FLAGYL) IVPB premix 500 mg, 500 mg, IntraVENous, Q8H, Vladimir Whittington MD, Last Rate: 100 mL/hr at 07/05/20 1356, 500 mg at 07/05/20 1356    diphenhydrAMINE (BENADRYL) capsule 25 mg, 25 mg, Oral, Q4H PRN, Angel Trinh MD, 25 mg at 07/05/20 1505    ondansetron (ZOFRAN) injection 4 mg, 4 mg, IntraVENous, Q4H PRN, Angel Trinh MD, 4 mg at 07/05/20 1505  ________________________________________________________________________  Care Plan discussed with:    Comments   Patient y    Family      RN y    Care Manager     Consultant                        Multidiciplinary team rounds were held today with , nursing, pharmacist and clinical coordinator. Patient's plan of care was discussed; medications were reviewed and discharge planning was addressed.      ________________________________________________________________________  Total NON critical care TIME:  35   Minutes    Total CRITICAL CARE TIME Spent:   Minutes non procedure based      Comments   >50% of visit spent in counseling and coordination of care     ________________________________________________________________________  Mahesh Cui MD     Procedures: see electronic medical records for all procedures/Xrays and details which were not copied into this note but were reviewed prior to creation of Plan. LABS:  I reviewed today's most current labs and imaging studies.   Pertinent labs include:  Recent Labs     07/05/20  0250 07/04/20  0738   WBC 11.4* 8.6   HGB 8.2* 10.1*   HCT 24.0* 27.6*    314     Recent Labs     07/05/20  0250 07/04/20  0738    139   K 3.5 3.2*    105   CO2 23 24   * 144*   BUN 9 12   CREA 0.92 1.00   CA 7.9* 9.3   MG  --  2.4   ALB  --  4.2   TBILI  --  1.5*   ALT  --  19       Signed: Cleve Sanchez MD

## 2020-07-05 NOTE — PROGRESS NOTES
Problem: Falls - Risk of  Goal: *Absence of Falls  Description: Document Alexander Boswell Fall Risk and appropriate interventions in the flowsheet.   Outcome: Progressing Towards Goal  Note: Fall Risk Interventions:            Medication Interventions: Patient to call before getting OOB

## 2020-07-05 NOTE — H&P
Hospitalist Admission Note    NAME: Melida Mandujano   :  1960   MRN:  [id number]     Date/Time:  2020 4:40 PM    Patient PCP: Suhas Pascal MD  ________________________________________________________________________    My assessment of this patient's clinical condition and my plan of care is as follows. Assessment / Plan:  Abdominal pain, diarrhea, nausea and vomiting likely related to enteritis  -Check stool studies including C. difficile. Start IV fluids with normal saline. Follow stool culture results. -LFTs are within normal limits  -Start IV fluids  -Start empiric ceftriaxone and Flagyl as well    Possible urinary tract infection  -Follow urine culture results. Continue empiric ceftriaxone that was started in the ED    History of sickle cell disease  -Patient reports increased pain in the joints  -Hemoglobin was 10.1 and reticulocyte count was elevated at 3.1  -Continue home hydroxyurea    Hypokalemia  -Replaced with KCl    Hypertension  Depression  History of GERD  -Continue losartan, Celexa and PPI      Code Status: Full code      DVT Prophylaxis: Heparin      Baseline: From home independent        Subjective:   CHIEF COMPLAINT: Abdominal pain, nausea and vomiting    HISTORY OF PRESENT ILLNESS:     Anjelica Ordaz is a 64 y.o.  female who presents with  past medical history of sickle cell disease, hypertension is coming the hospital chief complaint of abdominal pain, nausea and vomiting after eating at a socially distance lunch. She reports eating fish and some other side dishes and started having abdominal pain along with nausea vomiting and also watery diarrhea. She also felt dizzy and lightheaded. She reports abdominal pain is generalized, 3 x 10, nonradiating, without any aggravating or relieving factors. She reports she was not able to keep anything down. She felt dizzy but did not completely pass out.   She is also having increased pain in her joints from her sickle cell disease after the onset of symptoms. On arrival to ER, she was noted to be tachycardic. On labs she was noted to have a hemoglobin of 10.1. BMP showed a creatinine of 1.0. Potassium was 3.2. CMP was normal.  She was given IV fluids in the emergency department. She was noted to have abnormal urinalysis and was started on ceftriaxone. We were asked to admit for work up and evaluation of the above problems. Past Medical History:   Diagnosis Date    Anemia     SC hemoglobinopathy    Chronic pain     Depression     Hypertension     Liver disease     hepatitis C; pt reports \"cured\"    Sickle cell disease ([facility] [address]         Past Surgical History:   Procedure Laterality Date    BREAST SURGERY PROCEDURE UNLISTED      2 cysts removed from R breast    CHEST SURGERY PROCEDURE UNLISTED      status post thor for removal of a benign     HX BREAST BIOPSY Right 05/2007    negative    HX CHOLECYSTECTOMY      HX ORTHOPAEDIC      bony curettage of an ostial myelitis focus       Social History     Tobacco Use    Smoking status: Never Smoker    Smokeless tobacco: Never Used   Substance Use Topics    Alcohol use: No        Family History   Problem Relation Age of Onset    Hypertension Mother     Hypertension Father      Allergies   Allergen Reactions    Dilaudid [Hydromorphone (Bulk)] Itching     Can tolerate with benadryl and ondansetron    Percocet [Oxycodone-Acetaminophen] Itching        Prior to Admission medications    Medication Sig Start Date End Date Taking? Authorizing Provider   omeprazole (PRILOSEC OTC) 20 mg tablet Take 20 mg by mouth nightly. Yes Other, MD Ean   HYDROcodone-acetaminophen (NORCO)  mg tablet Take 1 Tab by mouth every six (6) hours as needed for Pain for up to 30 days. Max Daily Amount: 4 Tabs. [id number] 6/25/20 7/25/20  London Jose MD   promethazine (PHENERGAN) 25 mg tablet Take 1 Tab by mouth every eight (8) hours as needed for Nausea.  5/26/20   [address] Ayden Boucher MD   citalopram (CELEXA) 10 mg tablet TAKE 1 TABLET BY MOUTH EVERY NIGHT AT BEDTIME 4/28/20   Lilia Kelley MD   hydroxyurea (HYDREA) 500 mg capsule 1 tablet two times a day 10/17/19   Lilia Kelley MD   hydrocortisone (CORTISONE) 1 % topical cream Apply  to affected area two (2) times daily as needed for Skin Irritation. use thin layer 7/26/19   Lilia Kelley MD   telmisartan (MICARDIS) 40 mg tablet Take 1 Tab by mouth nightly. 7/1/19   Lilia Kelley MD       REVIEW OF SYSTEMS:     I am not able to complete the review of systems because:    The patient is intubated and sedated    The patient has altered mental status due to his acute medical problems    The patient has baseline aphasia from prior stroke(s)    The patient has baseline dementia and is not reliable historian    The patient is in acute medical distress and unable to provide information           Total of 12 systems reviewed as follows:       POSITIVE= underlined text  Negative = text not underlined  General:  fever, chills, sweats, generalized weakness, weight loss/gain,      loss of appetite   Eyes:    blurred vision, eye pain, loss of vision, double vision  ENT:    rhinorrhea, pharyngitis   Respiratory:   cough, sputum production, SOB, MARS, wheezing, pleuritic pain   Cardiology:   chest pain, palpitations, orthopnea, PND, edema, syncope   Gastrointestinal:  abdominal pain , N/V, diarrhea, dysphagia, constipation, bleeding   Genitourinary:  frequency, urgency, dysuria, hematuria, incontinence   Muskuloskeletal :  arthralgia, myalgia, back pain  Hematology:  easy bruising, nose or gum bleeding, lymphadenopathy   Dermatological: rash, ulceration, pruritis, color change / jaundice  Endocrine:   hot flashes or polydipsia   Neurological:  headache, dizziness, confusion, focal weakness, paresthesia,     Speech difficulties, memory loss, gait difficulty  Psychological: Feelings of anxiety, depression, agitation    Objective:   VITALS: Visit Vitals  /85 (BP 1 Location: Right arm, BP Patient Position: Sitting)   Pulse 77   Temp 99 °F (37.2 °C)   Resp 18   Ht 5' 10\" (1.778 m)   Wt 95.3 kg (210 lb)   SpO2 98%   Breastfeeding No   BMI 30.13 kg/m²       PHYSICAL EXAM:    General:    Alert, cooperative, no distress, appears stated age. HEENT: Atraumatic, anicteric sclerae, pink conjunctivae     No oral ulcers, mucosa moist, throat clear, dentition fair  Neck:  Supple, symmetrical,  thyroid: non tender  Lungs:   Clear to auscultation bilaterally. No Wheezing or Rhonchi. No rales. Chest wall:  No tenderness  No Accessory muscle use. Heart:   Regular  rhythm,  No  murmur   No edema  Abdomen:   Soft, non-tender. Not distended. Bowel sounds normal  Extremities: No cyanosis. No clubbing,      Skin turgor normal, Capillary refill normal, Radial dial pulse 2+  Skin:     Not pale. Not Jaundiced  No rashes   Psych:  Good insight. Not depressed. Not anxious or agitated. Neurologic: EOMs intact. No facial asymmetry. No aphasia or slurred speech. Symmetrical strength, Sensation grossly intact.  Alert and oriented X 4.     _______________________________________________________________________  Care Plan discussed with:    Comments   Patient y    Family      RN y    Care Manager                    Consultant:      _______________________________________________________________________  Expected  Disposition:   Home with Family y   HH/PT/OT/RN    SNF/LTC    JEREMY    ________________________________________________________________________  TOTAL TIME:  61 Minutes    Critical Care Provided     Minutes non procedure based      Comments    y Reviewed previous records   >50% of visit spent in counseling and coordination of care y Discussion with patient and/or family and questions answered       ________________________________________________________________________  Signed: Rankin Runner, MD    Procedures: see electronic medical records for all procedures/Xrays and details which were not copied into this note but were reviewed prior to creation of Plan. LAB DATA REVIEWED:    Recent Results (from the past 24 hour(s))   METABOLIC PANEL, BASIC    Collection Time: 07/05/20  2:50 AM   Result Value Ref Range    Sodium 137 136 - 145 mmol/L    Potassium 3.5 3.5 - 5.1 mmol/L    Chloride 108 97 - 108 mmol/L    CO2 23 21 - 32 mmol/L    Anion gap 6 5 - 15 mmol/L    Glucose 101 (H) 65 - 100 mg/dL    BUN 9 6 - 20 MG/DL    Creatinine 0.92 0.55 - 1.02 MG/DL    BUN/Creatinine ratio 10 (L) 12 - 20      GFR est AA >60 >60 ml/min/1.73m2    GFR est non-AA >60 >60 ml/min/1.73m2    Calcium 7.9 (L) 8.5 - 10.1 MG/DL   CBC WITH AUTOMATED DIFF    Collection Time: 07/05/20  2:50 AM   Result Value Ref Range    WBC 11.4 (H) 3.6 - 11.0 K/uL    RBC 2.01 (L) 3.80 - 5.20 M/uL    HGB 8.2 (L) 11.5 - 16.0 g/dL    HCT 24.0 (L) 35.0 - 47.0 %    .4 (H) 80.0 - 99.0 FL    MCH 40.8 (H) 26.0 - 34.0 PG    MCHC 34.2 30.0 - 36.5 g/dL    RDW 14.1 11.5 - 14.5 %    PLATELET 282 213 - 861 K/uL    MPV 11.9 8.9 - 12.9 FL    NRBC 11.7 (H) 0  WBC    ABSOLUTE NRBC 1.34 (H) 0.00 - 0.01 K/uL    NEUTROPHILS 64 32 - 75 %    LYMPHOCYTES 28 12 - 49 %    MONOCYTES 8 5 - 13 %    EOSINOPHILS 0 0 - 7 %    BASOPHILS 0 0 - 1 %    IMMATURE GRANULOCYTES 0 0.0 - 0.5 %    ABS. NEUTROPHILS 7.3 1.8 - 8.0 K/UL    ABS. LYMPHOCYTES 3.2 0.8 - 3.5 K/UL    ABS. MONOCYTES 0.9 0.0 - 1.0 K/UL    ABS. EOSINOPHILS 0.0 0.0 - 0.4 K/UL    ABS. BASOPHILS 0.0 0.0 - 0.1 K/UL    ABS. IMM.  GRANS. 0.0 0.00 - 0.04 K/UL    DF MANUAL      RBC COMMENTS ANISOCYTOSIS  2+

## 2020-07-05 NOTE — PROGRESS NOTES
Bedside and Verbal shift change report given to Gwendolyn Ormond (oncoming nurse) by Lan Zheng (offgoing nurse). Report included the following information SBAR, Kardex and Cardiac Rhythm NSR. Zone Phone: [phone]    No changes overnight.

## 2020-07-05 NOTE — PROGRESS NOTES
I advised patient that she has a consult for oncology and she stated that her hem/onc physician Dr Orvil Cogan advised her to refuse oncology consults as she was under his care.  Dr Sourav James is aware of the refusal.

## 2020-07-06 PROBLEM — K29.70 VIRAL GASTRITIS: Status: ACTIVE | Noted: 2020-07-06

## 2020-07-06 PROBLEM — E86.0 DEHYDRATION: Status: ACTIVE | Noted: 2020-07-06

## 2020-07-06 LAB
ANION GAP SERPL CALC-SCNC: 5 MMOL/L (ref 5–15)
BACTERIA SPEC CULT: NORMAL
BUN SERPL-MCNC: 5 MG/DL (ref 6–20)
BUN/CREAT SERPL: 6 (ref 12–20)
CALCIUM SERPL-MCNC: 7.9 MG/DL (ref 8.5–10.1)
CC UR VC: NORMAL
CHLORIDE SERPL-SCNC: 111 MMOL/L (ref 97–108)
CO2 SERPL-SCNC: 24 MMOL/L (ref 21–32)
CREAT SERPL-MCNC: 0.77 MG/DL (ref 0.55–1.02)
ERYTHROCYTE [DISTWIDTH] IN BLOOD BY AUTOMATED COUNT: 13.9 % (ref 11.5–14.5)
GLUCOSE SERPL-MCNC: 96 MG/DL (ref 65–100)
HCT VFR BLD AUTO: 23.3 % (ref 35–47)
HGB BLD-MCNC: 8.1 G/DL (ref 11.5–16)
MCH RBC QN AUTO: 41.3 PG (ref 26–34)
MCHC RBC AUTO-ENTMCNC: 34.8 G/DL (ref 30–36.5)
MCV RBC AUTO: 118.9 FL (ref 80–99)
NRBC # BLD: 1.16 K/UL (ref 0–0.01)
NRBC BLD-RTO: 14.9 PER 100 WBC
PLATELET # BLD AUTO: 251 K/UL (ref 150–400)
PMV BLD AUTO: 11.5 FL (ref 8.9–12.9)
POTASSIUM SERPL-SCNC: 4 MMOL/L (ref 3.5–5.1)
RBC # BLD AUTO: 1.96 M/UL (ref 3.8–5.2)
SERVICE CMNT-IMP: NORMAL
SODIUM SERPL-SCNC: 140 MMOL/L (ref 136–145)
WBC # BLD AUTO: 7.8 K/UL (ref 3.6–11)

## 2020-07-06 PROCEDURE — 74011000258 HC RX REV CODE- 258: Performed by: INTERNAL MEDICINE

## 2020-07-06 PROCEDURE — 94760 N-INVAS EAR/PLS OXIMETRY 1: CPT

## 2020-07-06 PROCEDURE — 36415 COLL VENOUS BLD VENIPUNCTURE: CPT

## 2020-07-06 PROCEDURE — 80048 BASIC METABOLIC PNL TOTAL CA: CPT

## 2020-07-06 PROCEDURE — 36600 WITHDRAWAL OF ARTERIAL BLOOD: CPT

## 2020-07-06 PROCEDURE — 74011250636 HC RX REV CODE- 250/636: Performed by: INTERNAL MEDICINE

## 2020-07-06 PROCEDURE — 74011250637 HC RX REV CODE- 250/637: Performed by: INTERNAL MEDICINE

## 2020-07-06 PROCEDURE — 85027 COMPLETE CBC AUTOMATED: CPT

## 2020-07-06 PROCEDURE — 65660000000 HC RM CCU STEPDOWN

## 2020-07-06 RX ORDER — FENTANYL 75 UG/H
1 PATCH TRANSDERMAL
Status: DISCONTINUED | OUTPATIENT
Start: 2020-07-06 | End: 2020-07-15 | Stop reason: HOSPADM

## 2020-07-06 RX ADMIN — DIPHENHYDRAMINE HYDROCHLORIDE 25 MG: 25 CAPSULE ORAL at 02:37

## 2020-07-06 RX ADMIN — Medication 10 ML: at 14:00

## 2020-07-06 RX ADMIN — ONDANSETRON 4 MG: 2 INJECTION INTRAMUSCULAR; INTRAVENOUS at 19:30

## 2020-07-06 RX ADMIN — ONDANSETRON 4 MG: 2 INJECTION INTRAMUSCULAR; INTRAVENOUS at 06:43

## 2020-07-06 RX ADMIN — SODIUM CHLORIDE 75 ML/HR: 900 INJECTION, SOLUTION INTRAVENOUS at 12:20

## 2020-07-06 RX ADMIN — METRONIDAZOLE 500 MG: 500 INJECTION, SOLUTION INTRAVENOUS at 12:25

## 2020-07-06 RX ADMIN — POTASSIUM CHLORIDE: 2 INJECTION, SOLUTION, CONCENTRATE INTRAVENOUS at 16:26

## 2020-07-06 RX ADMIN — HEPARIN SODIUM 5000 UNITS: 5000 INJECTION INTRAVENOUS; SUBCUTANEOUS at 05:34

## 2020-07-06 RX ADMIN — HYDROXYUREA 500 MG: 500 CAPSULE ORAL at 09:12

## 2020-07-06 RX ADMIN — HYDROCODONE BITARTRATE AND ACETAMINOPHEN 1 TABLET: 10; 325 TABLET ORAL at 09:12

## 2020-07-06 RX ADMIN — ONDANSETRON 4 MG: 2 INJECTION INTRAMUSCULAR; INTRAVENOUS at 02:37

## 2020-07-06 RX ADMIN — HYDROMORPHONE HYDROCHLORIDE 1 MG: 1 INJECTION, SOLUTION INTRAMUSCULAR; INTRAVENOUS; SUBCUTANEOUS at 15:06

## 2020-07-06 RX ADMIN — DIPHENHYDRAMINE HYDROCHLORIDE 25 MG: 25 CAPSULE ORAL at 06:42

## 2020-07-06 RX ADMIN — HYDROMORPHONE HYDROCHLORIDE 1 MG: 1 INJECTION, SOLUTION INTRAMUSCULAR; INTRAVENOUS; SUBCUTANEOUS at 02:37

## 2020-07-06 RX ADMIN — DOCUSATE SODIUM 100 MG: 100 CAPSULE, LIQUID FILLED ORAL at 12:24

## 2020-07-06 RX ADMIN — HYDROXYUREA 500 MG: 500 CAPSULE ORAL at 17:15

## 2020-07-06 RX ADMIN — HYDROMORPHONE HYDROCHLORIDE 1 MG: 1 INJECTION, SOLUTION INTRAMUSCULAR; INTRAVENOUS; SUBCUTANEOUS at 19:30

## 2020-07-06 RX ADMIN — HYDROMORPHONE HYDROCHLORIDE 1 MG: 1 INJECTION, SOLUTION INTRAMUSCULAR; INTRAVENOUS; SUBCUTANEOUS at 11:01

## 2020-07-06 RX ADMIN — LOSARTAN POTASSIUM 50 MG: 25 TABLET, FILM COATED ORAL at 21:30

## 2020-07-06 RX ADMIN — ONDANSETRON 4 MG: 2 INJECTION INTRAMUSCULAR; INTRAVENOUS at 23:47

## 2020-07-06 RX ADMIN — CEFTRIAXONE 1 G: 1 INJECTION, POWDER, FOR SOLUTION INTRAMUSCULAR; INTRAVENOUS at 12:25

## 2020-07-06 RX ADMIN — HYDROMORPHONE HYDROCHLORIDE 1 MG: 1 INJECTION, SOLUTION INTRAMUSCULAR; INTRAVENOUS; SUBCUTANEOUS at 06:43

## 2020-07-06 RX ADMIN — DIPHENHYDRAMINE HYDROCHLORIDE 25 MG: 25 CAPSULE ORAL at 19:30

## 2020-07-06 RX ADMIN — HEPARIN SODIUM 5000 UNITS: 5000 INJECTION INTRAVENOUS; SUBCUTANEOUS at 21:31

## 2020-07-06 RX ADMIN — HEPARIN SODIUM 5000 UNITS: 5000 INJECTION INTRAVENOUS; SUBCUTANEOUS at 12:24

## 2020-07-06 RX ADMIN — PANTOPRAZOLE SODIUM 40 MG: 40 TABLET, DELAYED RELEASE ORAL at 21:30

## 2020-07-06 RX ADMIN — METRONIDAZOLE 500 MG: 500 INJECTION, SOLUTION INTRAVENOUS at 05:34

## 2020-07-06 RX ADMIN — DIPHENHYDRAMINE HYDROCHLORIDE 25 MG: 25 CAPSULE ORAL at 11:01

## 2020-07-06 RX ADMIN — DIPHENHYDRAMINE HYDROCHLORIDE 25 MG: 25 CAPSULE ORAL at 15:06

## 2020-07-06 RX ADMIN — HYDROMORPHONE HYDROCHLORIDE 1 MG: 1 INJECTION, SOLUTION INTRAMUSCULAR; INTRAVENOUS; SUBCUTANEOUS at 23:47

## 2020-07-06 RX ADMIN — ONDANSETRON 4 MG: 2 INJECTION INTRAMUSCULAR; INTRAVENOUS at 11:01

## 2020-07-06 RX ADMIN — CITALOPRAM HYDROBROMIDE 10 MG: 20 TABLET ORAL at 21:30

## 2020-07-06 RX ADMIN — ONDANSETRON 4 MG: 2 INJECTION INTRAMUSCULAR; INTRAVENOUS at 15:06

## 2020-07-06 RX ADMIN — DIPHENHYDRAMINE HYDROCHLORIDE 25 MG: 25 CAPSULE ORAL at 23:47

## 2020-07-06 RX ADMIN — Medication 10 ML: at 05:37

## 2020-07-06 NOTE — PROGRESS NOTES
Problem: Falls - Risk of  Goal: *Absence of Falls  Description: Document Alexander Boswell Fall Risk and appropriate interventions in the flowsheet.   Outcome: Progressing Towards Goal  Note: Fall Risk Interventions:            Medication Interventions: Teach patient to arise slowly                   Problem: Patient Education: Go to Patient Education Activity  Goal: Patient/Family Education  Outcome: Progressing Towards Goal

## 2020-07-06 NOTE — PROGRESS NOTES
* No surgery found *  * No surgery found *  Bedside shift change report given to Robin Cobos RN (oncoming nurse) by Alfonso Wong RN (offgoing nurse). Report included the following information SBAR and Kardex. Zone Phone:   [phone]    Significant changes during shift:  Every four hours pain medication administration, continued IV antibiotics    Patient Information    Belkis Burkett  64 y.o.  7/4/2020  6:52 AM by Silver Peña MD. Belkis Burkett was admitted from Home    Problem List    Patient Active Problem List    Diagnosis Date Noted    AVN of femur ([facility] [address] 01/25/2020     Priority: 3 - Three    Hypokalemia 01/25/2020     Priority: 3 - Three    Viral gastritis 07/06/2020    Dehydration 07/06/2020    UTI (urinary tract infection) 07/04/2020    Acute gastroenteritis 10/27/2019    Class 1 obesity due to excess calories with serious comorbidity and body mass index (BMI) of 30.0 to 30.9 in adult 08/13/2019    Sickle cell disease ([facility] [address] 02/10/2019    Overweight (BMI 25.0-29.9) 02/07/2019    Sickle cell pain crisis ([facility] [address] 09/08/2018    Carpal tunnel syndrome of right wrist 06/12/2018    Pulsatile tinnitus 10/31/2017    Sickle cell crisis ([facility] [address] 05/22/2017    Depression 10/05/2014    Hypertension 10/03/2014    Venous insufficiency 10/03/2014     Past Medical History:   Diagnosis Date    Anemia     SC hemoglobinopathy    Chronic pain     Depression     Hypertension     Liver disease     hepatitis C; pt reports \"cured\"    Sickle cell disease ([facility] [address]      Core Measures:    CVA: No No  CHF:No No  PNA:No No    Activity Status:    OOB to Chair No  Ambulated this shift Yes   Bed Rest No    DVT prophylaxis:    DVT prophylaxis Med- Yes  DVT prophylaxis SCD or SARA- No     Patient Safety:    Falls Score Total Score: 1  Safety Level_______  Bed Alarm On? No  Sitter?  No    Plan for upcoming shift: D/c home with family     Discharge Plan: Yes home    Active Consults:  None

## 2020-07-06 NOTE — PROGRESS NOTES
Problem: Falls - Risk of  Goal: *Absence of Falls  Description: Document Sergio Guerra Fall Risk and appropriate interventions in the flowsheet.   Outcome: Progressing Towards Goal  Note: Fall Risk Interventions:            Medication Interventions: Patient to call before getting OOB                   Problem: Patient Education: Go to Patient Education Activity  Goal: Patient/Family Education  Outcome: Progressing Towards Goal

## 2020-07-06 NOTE — PROGRESS NOTES
* No surgery found *  * No surgery found *  Bedside shift change report given to Gem Loya RN (oncoming nurse) by SAMIR Aranda (offgoing nurse). Report included the following information SBAR and Kardex. Zone Phone:   [phone]      Significant changes during shift:  9/10 groin, bilateral leg, and foot pain. Benadryl, Zofran, and Dilaudid given q4h. Unable to successfully draw patient's morning labs, RT was about to draw and blood was sent to lab. Patient did not have a BM so unable to collect stool sample      Patient Information    Nicolas Yun  64 y.o.  7/4/2020  6:52 AM by Stephanie Spencer MD. Nicolas Yun was admitted from Home    Problem List    Patient Active Problem List    Diagnosis Date Noted    AVN of femur ([facility] [address] 01/25/2020     Priority: 3 - Three    Hypokalemia 01/25/2020     Priority: 3 - Three    UTI (urinary tract infection) 07/04/2020    Acute gastroenteritis 10/27/2019    Class 1 obesity due to excess calories with serious comorbidity and body mass index (BMI) of 30.0 to 30.9 in adult 08/13/2019    Sickle cell disease ([facility] [address] 02/10/2019    Overweight (BMI 25.0-29.9) 02/07/2019    Sickle cell pain crisis ([facility] [address] 09/08/2018    Carpal tunnel syndrome of right wrist 06/12/2018    Pulsatile tinnitus 10/31/2017    Sickle cell crisis ([facility] [address] 05/22/2017    Depression 10/05/2014    Hypertension 10/03/2014    Venous insufficiency 10/03/2014     Past Medical History:   Diagnosis Date    Anemia     SC hemoglobinopathy    Chronic pain     Depression     Hypertension     Liver disease     hepatitis C; pt reports \"cured\"    Sickle cell disease ([facility] [address]          Core Measures:    CVA: No No  CHF:No No  PNA:No No      Activity Status:    OOB to Chair No  Ambulated this shift Yes   Bed Rest No      DVT prophylaxis:    DVT prophylaxis Med- Yes  DVT prophylaxis SCD or SARA- No       Patient Safety:    Falls Score Total Score: 1  Safety Level_______  Bed Alarm On? No  Sitter?  No    Plan for upcoming shift: D/c home with family         Discharge Plan: Yes home    Active Consults:  None

## 2020-07-06 NOTE — PROGRESS NOTES
KRISTINA PLAN:    1) Home - Open to Home Health if needed - no previous use of home health as she has not been home bound    2) 2nd IM prior to discharge    3) Follow-up appt    Reason for Admission:   Nausea and vomiting - UTI - flare of Sickle Cell disease                  RUR Score:     26%             PCP: First and Last name:  Dr. Marleny Villafana   Name of Practice:    Are you a current patient: Yes/No: Yes   Approximate date of last visit: April 2020   Can you participate in a virtual visit if needed: Yes    Do you (patient/family) have any concerns for transition/discharge? No concerns about going home - patient states she has strong support from family, Worship family and friends that can assist as needed with personal care or transportation if needed              Plan for utilizing home health:   No home health has been needed in the past, but patient open to it if needed. Current Advanced Directive/Advance Care Plan:  No ACP on file - patient states she has listed her brother as her Emergency Contact, but her son and daughter are her next of kin. Patient does not want to discuss or complete paperwork for ACP at present time. Transition of Care Plan:       Patient is 65 yo female w/ Hx of frequent hospitalizations for flare of sickle cell disease. She states she started taking Hydroxyurea at last discharge and has been able to remain out of hospital for approximately 6 months without a \"flare\". She has been following up with Dr. Luis Daniel Sevilla via Virtual Visits. Patient states she continues to live alone in private home and has been babysitting her 11 month old grandchild. Patient reports strong support from family, Worship family and life long friends. No current use of DME and none in the home. [facility]  PCP Verified by CM: Yes(Dr. Farmer Persons - [phone])  Transition of Care Consult (CM Consult):  Other(Initial CM Assessment and D/C planning)  [facility] Signup: No  Discharge Durable Medical Equipment: No  Physical Therapy Consult: No  Occupational Therapy Consult: No  Speech Therapy Consult: No  Current Support Network: Family Lives Nearby, Own Home, Lives Alone  Confirm Follow Up Transport: Self(Family and friends can assist patient as needed)    Cass Mi RN, BSN, [address] [place] [address]    Coordinator  [phone]

## 2020-07-06 NOTE — PROGRESS NOTES
General Daily Progress Note    Admit Date: 7/4/2020    Subjective:     Patient complains of generalized pain. .     Current Facility-Administered Medications   Medication Dose Route Frequency    [START ON 7/7/2020] lactobac ac& pc-s.therm-b.anim (CARLITOS Q/RISAQUAD)  1 Cap Oral DAILY    dextrose 5 % - 0.45% NaCl 1,000 mL with potassium chloride 30 mEq infusion   IntraVENous CONTINUOUS    fentaNYL (DURAGESIC) 75 mcg/hr patch 1 Patch  1 Patch TransDERmal Q72H    sodium chloride (NS) flush 5-10 mL  5-10 mL IntraVENous PRN    cefTRIAXone (ROCEPHIN) 1 g in 0.9% sodium chloride (MBP/ADV) 50 mL  1 g IntraVENous Q24H    citalopram (CELEXA) tablet 10 mg  10 mg Oral QHS    hydroxyurea (HYDREA) chemo cap 500 mg  500 mg Oral BID    pantoprazole (PROTONIX) tablet 40 mg  40 mg Oral QHS    losartan (COZAAR) tablet 50 mg  50 mg Oral QHS    sodium chloride (NS) flush 5-40 mL  5-40 mL IntraVENous Q8H    sodium chloride (NS) flush 5-40 mL  5-40 mL IntraVENous PRN    acetaminophen (TYLENOL) tablet 650 mg  650 mg Oral Q6H PRN    docusate sodium (COLACE) capsule 100 mg  100 mg Oral DAILY PRN    heparin (porcine) injection 5,000 Units  5,000 Units SubCUTAneous Q8H    HYDROmorphone (PF) (DILAUDID) injection 1 mg  1 mg IntraVENous Q4H PRN    diphenhydrAMINE (BENADRYL) capsule 25 mg  25 mg Oral Q4H PRN    ondansetron (ZOFRAN) injection 4 mg  4 mg IntraVENous Q4H PRN        Review of Systems  A comprehensive review of systems was negative.     Objective:     Patient Vitals for the past 24 hrs:   BP Temp Pulse Resp SpO2 Weight   07/06/20 1218 144/84 99.6 °F (37.6 °C) 88 18 100 %    07/06/20 1101      209 lb 3.2 oz (94.9 kg)   07/06/20 0701 147/79 98.9 °F (37.2 °C) 76 18 100 %    07/06/20 0350 132/75 98.8 °F (37.1 °C) 73 18 98 %    07/05/20 2319 113/68 99 °F (37.2 °C) 71 18 98 %    07/05/20 2123 (!) 162/103  82      07/05/20 1915 130/84 99.1 °F (37.3 °C) 79 18 100 %    07/05/20 1628 147/88 99.3 °F (37.4 °C) 75 18 98 %      07/06 0701 - 07/06 1900  In: 330 [P.O.:180; I.V.:150]  Out: -   No intake/output data recorded. Physical Exam:   Visit Vitals  /84   Pulse 88   Temp 99.6 °F (37.6 °C)   Resp 18   Ht 5' 10\" (1.778 m)   Wt 209 lb 3.2 oz (94.9 kg)   SpO2 100%   Breastfeeding No   BMI 30.02 kg/m²     General appearance: alert, cooperative, no distress, appears stated age  Neck: supple, symmetrical, trachea midline, no adenopathy, thyroid: not enlarged, symmetric, no tenderness/mass/nodules, no carotid bruit and no JVD  Lungs: clear to auscultation bilaterally  Heart: regular rate and rhythm, S1, S2 normal, no murmur, click, rub or gallop  Abdomen: soft, non-tender. Bowel sounds normal. No masses,  no organomegaly  Extremities: extremities normal, atraumatic, no cyanosis or edema    Assessment:     Active Problems:    S[facility] ([facility] (5/22/2017)      UTI (urinary tract infection) (7/4/2020)      Viral gastritis (7/6/2020)      Dehydration (7/6/2020)        Plan:     1. The patient's GI symptoms suggest a viral gastritis. She denies having diarrhea to me. Symptomatic treatment for now. Will progress diet. 2.  She does indeed have a sickle cell crisis appropriate medication for pain control. 3.  Continue hydration. 4.  Patient has a polymicrobial urinary tract infection which suggest no active infection present. Rocephin will be discontinued.

## 2020-07-07 LAB
ALBUMIN SERPL-MCNC: 3 G/DL (ref 3.5–5)
ALBUMIN/GLOB SERPL: 0.7 {RATIO} (ref 1.1–2.2)
ALP SERPL-CCNC: 107 U/L (ref 45–117)
ALT SERPL-CCNC: 17 U/L (ref 12–78)
ANION GAP SERPL CALC-SCNC: 4 MMOL/L (ref 5–15)
AST SERPL-CCNC: 45 U/L (ref 15–37)
BASOPHILS # BLD: 0 K/UL (ref 0–0.1)
BASOPHILS NFR BLD: 0 % (ref 0–1)
BILIRUB SERPL-MCNC: 1 MG/DL (ref 0.2–1)
BLASTS NFR BLD MANUAL: 0 %
BUN SERPL-MCNC: 5 MG/DL (ref 6–20)
BUN/CREAT SERPL: 8 (ref 12–20)
CALCIUM SERPL-MCNC: 8.3 MG/DL (ref 8.5–10.1)
CHLORIDE SERPL-SCNC: 110 MMOL/L (ref 97–108)
CO2 SERPL-SCNC: 24 MMOL/L (ref 21–32)
CREAT SERPL-MCNC: 0.65 MG/DL (ref 0.55–1.02)
DIFFERENTIAL METHOD BLD: ABNORMAL
EOSINOPHIL # BLD: 0.1 K/UL (ref 0–0.4)
EOSINOPHIL NFR BLD: 1 % (ref 0–7)
ERYTHROCYTE [DISTWIDTH] IN BLOOD BY AUTOMATED COUNT: 13.8 % (ref 11.5–14.5)
GLOBULIN SER CALC-MCNC: 4.4 G/DL (ref 2–4)
GLUCOSE SERPL-MCNC: 86 MG/DL (ref 65–100)
HCT VFR BLD AUTO: 25.9 % (ref 35–47)
HGB BLD-MCNC: 8.9 G/DL (ref 11.5–16)
IMM GRANULOCYTES # BLD AUTO: 0 K/UL
IMM GRANULOCYTES NFR BLD AUTO: 0 %
LYMPHOCYTES # BLD: 5.2 K/UL (ref 0.8–3.5)
LYMPHOCYTES NFR BLD: 60 % (ref 12–49)
MCH RBC QN AUTO: 41 PG (ref 26–34)
MCHC RBC AUTO-ENTMCNC: 34.4 G/DL (ref 30–36.5)
MCV RBC AUTO: 119.4 FL (ref 80–99)
METAMYELOCYTES NFR BLD MANUAL: 0 %
MONOCYTES # BLD: 0.6 K/UL (ref 0–1)
MONOCYTES NFR BLD: 7 % (ref 5–13)
MYELOCYTES NFR BLD MANUAL: 0 %
NEUTS BAND NFR BLD MANUAL: 0 % (ref 0–6)
NEUTS SEG # BLD: 2.8 K/UL (ref 1.8–8)
NEUTS SEG NFR BLD: 32 % (ref 32–75)
NRBC # BLD: 0.96 K/UL (ref 0–0.01)
NRBC BLD-RTO: 11 PER 100 WBC
OTHER CELLS NFR BLD MANUAL: 0 %
PLATELET # BLD AUTO: 251 K/UL (ref 150–400)
PMV BLD AUTO: 11.8 FL (ref 8.9–12.9)
POTASSIUM SERPL-SCNC: 4.3 MMOL/L (ref 3.5–5.1)
PROMYELOCYTES NFR BLD MANUAL: 0 %
PROT SERPL-MCNC: 7.4 G/DL (ref 6.4–8.2)
RBC # BLD AUTO: 2.17 M/UL (ref 3.8–5.2)
RBC MORPH BLD: ABNORMAL
RBC MORPH BLD: ABNORMAL
SODIUM SERPL-SCNC: 138 MMOL/L (ref 136–145)
WBC # BLD AUTO: 8.7 K/UL (ref 3.6–11)

## 2020-07-07 PROCEDURE — 80053 COMPREHEN METABOLIC PANEL: CPT

## 2020-07-07 PROCEDURE — 36415 COLL VENOUS BLD VENIPUNCTURE: CPT

## 2020-07-07 PROCEDURE — 94762 N-INVAS EAR/PLS OXIMTRY CONT: CPT

## 2020-07-07 PROCEDURE — 74011250636 HC RX REV CODE- 250/636: Performed by: INTERNAL MEDICINE

## 2020-07-07 PROCEDURE — 65660000000 HC RM CCU STEPDOWN

## 2020-07-07 PROCEDURE — 74011250637 HC RX REV CODE- 250/637: Performed by: INTERNAL MEDICINE

## 2020-07-07 PROCEDURE — 85027 COMPLETE CBC AUTOMATED: CPT

## 2020-07-07 PROCEDURE — 94760 N-INVAS EAR/PLS OXIMETRY 1: CPT

## 2020-07-07 PROCEDURE — 74011000258 HC RX REV CODE- 258: Performed by: INTERNAL MEDICINE

## 2020-07-07 RX ORDER — HYDROMORPHONE HYDROCHLORIDE 1 MG/ML
1 INJECTION, SOLUTION INTRAMUSCULAR; INTRAVENOUS; SUBCUTANEOUS
Status: DISCONTINUED | OUTPATIENT
Start: 2020-07-07 | End: 2020-07-13

## 2020-07-07 RX ADMIN — HYDROMORPHONE HYDROCHLORIDE 1 MG: 1 INJECTION, SOLUTION INTRAMUSCULAR; INTRAVENOUS; SUBCUTANEOUS at 23:11

## 2020-07-07 RX ADMIN — DIPHENHYDRAMINE HYDROCHLORIDE 25 MG: 25 CAPSULE ORAL at 12:41

## 2020-07-07 RX ADMIN — HEPARIN SODIUM 5000 UNITS: 5000 INJECTION INTRAVENOUS; SUBCUTANEOUS at 04:27

## 2020-07-07 RX ADMIN — DIPHENHYDRAMINE HYDROCHLORIDE 25 MG: 25 CAPSULE ORAL at 04:27

## 2020-07-07 RX ADMIN — HYDROMORPHONE HYDROCHLORIDE 1 MG: 1 INJECTION, SOLUTION INTRAMUSCULAR; INTRAVENOUS; SUBCUTANEOUS at 17:07

## 2020-07-07 RX ADMIN — ONDANSETRON 4 MG: 2 INJECTION INTRAMUSCULAR; INTRAVENOUS at 12:41

## 2020-07-07 RX ADMIN — DIPHENHYDRAMINE HYDROCHLORIDE 25 MG: 25 CAPSULE ORAL at 08:39

## 2020-07-07 RX ADMIN — LOSARTAN POTASSIUM 50 MG: 25 TABLET, FILM COATED ORAL at 21:00

## 2020-07-07 RX ADMIN — PANTOPRAZOLE SODIUM 40 MG: 40 TABLET, DELAYED RELEASE ORAL at 21:00

## 2020-07-07 RX ADMIN — CITALOPRAM HYDROBROMIDE 10 MG: 20 TABLET ORAL at 21:00

## 2020-07-07 RX ADMIN — ONDANSETRON 4 MG: 2 INJECTION INTRAMUSCULAR; INTRAVENOUS at 17:07

## 2020-07-07 RX ADMIN — HYDROMORPHONE HYDROCHLORIDE 1 MG: 1 INJECTION, SOLUTION INTRAMUSCULAR; INTRAVENOUS; SUBCUTANEOUS at 12:41

## 2020-07-07 RX ADMIN — ONDANSETRON 4 MG: 2 INJECTION INTRAMUSCULAR; INTRAVENOUS at 21:00

## 2020-07-07 RX ADMIN — HYDROMORPHONE HYDROCHLORIDE 1 MG: 1 INJECTION, SOLUTION INTRAMUSCULAR; INTRAVENOUS; SUBCUTANEOUS at 08:39

## 2020-07-07 RX ADMIN — HYDROMORPHONE HYDROCHLORIDE 1 MG: 1 INJECTION, SOLUTION INTRAMUSCULAR; INTRAVENOUS; SUBCUTANEOUS at 20:39

## 2020-07-07 RX ADMIN — HYDROXYUREA 500 MG: 500 CAPSULE ORAL at 17:06

## 2020-07-07 RX ADMIN — DIPHENHYDRAMINE HYDROCHLORIDE 25 MG: 25 CAPSULE ORAL at 17:06

## 2020-07-07 RX ADMIN — ONDANSETRON 4 MG: 2 INJECTION INTRAMUSCULAR; INTRAVENOUS at 04:27

## 2020-07-07 RX ADMIN — HYDROXYUREA 500 MG: 500 CAPSULE ORAL at 08:39

## 2020-07-07 RX ADMIN — Medication 10 ML: at 14:46

## 2020-07-07 RX ADMIN — Medication 1 CAPSULE: at 08:39

## 2020-07-07 RX ADMIN — HYDROMORPHONE HYDROCHLORIDE 1 MG: 1 INJECTION, SOLUTION INTRAMUSCULAR; INTRAVENOUS; SUBCUTANEOUS at 04:27

## 2020-07-07 RX ADMIN — ONDANSETRON 4 MG: 2 INJECTION INTRAMUSCULAR; INTRAVENOUS at 08:38

## 2020-07-07 RX ADMIN — HEPARIN SODIUM 5000 UNITS: 5000 INJECTION INTRAVENOUS; SUBCUTANEOUS at 14:46

## 2020-07-07 RX ADMIN — POTASSIUM CHLORIDE: 2 INJECTION, SOLUTION, CONCENTRATE INTRAVENOUS at 04:27

## 2020-07-07 RX ADMIN — DIPHENHYDRAMINE HYDROCHLORIDE 25 MG: 25 CAPSULE ORAL at 21:00

## 2020-07-07 RX ADMIN — HEPARIN SODIUM 5000 UNITS: 5000 INJECTION INTRAVENOUS; SUBCUTANEOUS at 21:00

## 2020-07-07 RX ADMIN — POTASSIUM CHLORIDE: 2 INJECTION, SOLUTION, CONCENTRATE INTRAVENOUS at 15:59

## 2020-07-07 NOTE — PROGRESS NOTES
Notified respiratory therapy that patient needs a continuous pulse ox. Stated they will bring it up shortly.

## 2020-07-07 NOTE — PROGRESS NOTES
Problem: Falls - Risk of  Goal: *Absence of Falls  Description: Document Sergio Guerra Fall Risk and appropriate interventions in the flowsheet.   Outcome: Progressing Towards Goal  Note: Fall Risk Interventions:            Medication Interventions: Bed/chair exit alarm                   Problem: Patient Education: Go to Patient Education Activity  Goal: Patient/Family Education  Outcome: Progressing Towards Goal

## 2020-07-07 NOTE — PROGRESS NOTES
* No surgery found *  * No surgery found *  Bedside shift change report given to Simone Sultana RN (oncoming nurse) by Amado Scruggs RN (offgoing nurse). Report included the following information SBAR and Kardex. Zone Phone:   [phone]    Significant changes during shift:  Continued pain management- frequency increased    Patient Information    Jayshree Hutton  64 y.o.  7/4/2020  6:52 AM by Mina Gonsalez MD. Jayhsree Hutton was admitted from Home    Problem List    Patient Active Problem List    Diagnosis Date Noted    AVN of femur ([facility] 01/25/2020     Priority: 3 - Three    Hypokalemia 01/25/2020     Priority: 3 - Three    Viral gastritis 07/06/2020    Dehydration 07/06/2020    UTI (urinary tract infection) 07/04/2020    Acute gastroenteritis 10/27/2019    Class 1 obesity due to excess calories with serious comorbidity and body mass index (BMI) of 30.0 to 30.9 in adult 08/13/2019    Sickle cell disease ([facility] 02/10/2019    Overweight (BMI 25.0-29.9) 02/07/2019    Sickle cell pain crisis ([facility] 09/08/2018    Carpal tunnel syndrome of right wrist 06/12/2018    Pulsatile tinnitus 10/31/2017    Sickle cell crisis ([facility] 05/22/2017    Depression 10/05/2014    Hypertension 10/03/2014    Venous insufficiency 10/03/2014     Past Medical History:   Diagnosis Date    Anemia     SC hemoglobinopathy    Chronic pain     Depression     Hypertension     Liver disease     hepatitis C; pt reports \"cured\"    Sickle cell disease ([facility]      Core Measures:    CVA: No No  CHF:No No  PNA:No No    Activity Status:    OOB to Chair No  Ambulated this shift Yes   Bed Rest No    DVT prophylaxis:    DVT prophylaxis Med- Yes  DVT prophylaxis SCD or SARA- No     Patient Safety:    Falls Score Total Score: 1  Safety Level_______  Bed Alarm On? No  Sitter?  No    Plan for upcoming shift: D/c home with family     Discharge Plan: Yes home    Active Consults:  None

## 2020-07-07 NOTE — PROGRESS NOTES
* No surgery found *  * No surgery found *  Bedside shift change report given to Edis Foss RN (oncoming nurse) by Aliza Stewart RN (offgoing nurse). Report included the following information SBAR and Kardex. Zone Phone:   [phone]    Significant changes during shift:  none  Patient Information    Lenore Albarran  64 y.o.  7/4/2020  6:52 AM by Christi Wells MD. Lenore Albarran was admitted from Home    Problem List    Patient Active Problem List    Diagnosis Date Noted    AVN of femur ([facility] 01/25/2020     Priority: 3 - Three    Hypokalemia 01/25/2020     Priority: 3 - Three    Viral gastritis 07/06/2020    Dehydration 07/06/2020    UTI (urinary tract infection) 07/04/2020    Acute gastroenteritis 10/27/2019    Class 1 obesity due to excess calories with serious comorbidity and body mass index (BMI) of 30.0 to 30.9 in adult 08/13/2019    Sickle cell disease ([facility] 02/10/2019    Overweight (BMI 25.0-29.9) 02/07/2019    Sickle cell pain crisis ([address] 09/08/2018    Carpal tunnel syndrome of right wrist 06/12/2018    Pulsatile tinnitus 10/31/2017    Sickle cell crisis ([facility] 05/22/2017    Depression 10/05/2014    Hypertension 10/03/2014    Venous insufficiency 10/03/2014     Past Medical History:   Diagnosis Date    Anemia     SC hemoglobinopathy    Chronic pain     Depression     Hypertension     Liver disease     hepatitis C; pt reports \"cured\"    Sickle cell disease ([facility]      Core Measures:    CVA: No No  CHF:No No  PNA:No No    Activity Status:    OOB to Chair No  Ambulated this shift Yes   Bed Rest No    DVT prophylaxis:    DVT prophylaxis Med- Yes  DVT prophylaxis SCD or SARA- No     Patient Safety:    Falls Score Total Score: 1  Safety Level_______  Bed Alarm On? No  Sitter?  No    Plan for upcoming shift: D/c home with family     Discharge Plan: Yes home    Active Consults:  None

## 2020-07-07 NOTE — PROGRESS NOTES
General Daily Progress Note    Admit Date: 7/4/2020    Subjective:     Patient has no complaint . Mark Atkins Current Facility-Administered Medications   Medication Dose Route Frequency    HYDROmorphone (PF) (DILAUDID) injection 1 mg  1 mg IntraVENous Q3H PRN    lactobac ac& pc-s.therm-b.anim (CARLITOS Q/RISAQUAD)  1 Cap Oral DAILY    dextrose 5 % - 0.45% NaCl 1,000 mL with potassium chloride 30 mEq infusion   IntraVENous CONTINUOUS    fentaNYL (DURAGESIC) 75 mcg/hr patch 1 Patch  1 Patch TransDERmal Q72H    sodium chloride (NS) flush 5-10 mL  5-10 mL IntraVENous PRN    citalopram (CELEXA) tablet 10 mg  10 mg Oral QHS    hydroxyurea (HYDREA) chemo cap 500 mg  500 mg Oral BID    pantoprazole (PROTONIX) tablet 40 mg  40 mg Oral QHS    losartan (COZAAR) tablet 50 mg  50 mg Oral QHS    sodium chloride (NS) flush 5-40 mL  5-40 mL IntraVENous Q8H    sodium chloride (NS) flush 5-40 mL  5-40 mL IntraVENous PRN    acetaminophen (TYLENOL) tablet 650 mg  650 mg Oral Q6H PRN    docusate sodium (COLACE) capsule 100 mg  100 mg Oral DAILY PRN    heparin (porcine) injection 5,000 Units  5,000 Units SubCUTAneous Q8H    diphenhydrAMINE (BENADRYL) capsule 25 mg  25 mg Oral Q4H PRN    ondansetron (ZOFRAN) injection 4 mg  4 mg IntraVENous Q4H PRN        Review of Systems  A comprehensive review of systems was negative.     Objective:     Patient Vitals for the past 24 hrs:   BP Temp Pulse Resp SpO2   07/07/20 1132 (!) 144/93 98.7 °F (37.1 °C) 84 18 98 %   07/07/20 0640 140/83 98.7 °F (37.1 °C) 75 14 99 %   07/07/20 0304 115/66 98 °F (36.7 °C) 77 18 96 %   07/06/20 2317 134/71 99 °F (37.2 °C) 83 18 99 %   07/06/20 1854 148/77 99 °F (37.2 °C) 85 18 99 %   07/06/20 1506 (!) 163/91 98.9 °F (37.2 °C) 85 18 100 %     07/07 0701 - 07/07 1900  In: 440 [P.O.:440]  Out: -   07/05 1901 - 07/07 0700  In: 330 [P.O.:180; I.V.:150]  Out: -     Physical Exam:   Visit Vitals  BP (!) 144/93   Pulse 84   Temp 98.7 °F (37.1 °C)   Resp 18   Ht 5' 10\" (1.778 m)   Wt 209 lb 3.2 oz (94.9 kg)   SpO2 98%   Breastfeeding No   BMI 30.02 kg/m²     General appearance: alert, cooperative, no distress, appears stated age  Neck: supple, symmetrical, trachea midline, no adenopathy, thyroid: not enlarged, symmetric, no tenderness/mass/nodules, no carotid bruit and no JVD  Lungs: clear to auscultation bilaterally  Heart: regular rate and rhythm, S1, S2 normal, no murmur, click, rub or gallop  Abdomen: soft, non-tender. Bowel sounds normal. No masses,  no organomegaly  Extremities: extremities normal, atraumatic, no cyanosis or edema    Assessment:     Active Problems:    Sickle cell crisis ([facility] (5/22/2017)      UTI (urinary tract infection) (7/4/2020)      Viral gastritis (7/6/2020)      Dehydration (7/6/2020)        Plan:     1. Continue treatment of painful crises. 2.  Hydration continues to improve. 3.  Upper tract GI symptoms stabilizing.

## 2020-07-07 NOTE — PROGRESS NOTES
Problem: Falls - Risk of  Goal: *Absence of Falls  Description: Document Josseline Barraza Fall Risk and appropriate interventions in the flowsheet.   Outcome: Progressing Towards Goal  Note: Fall Risk Interventions:            Medication Interventions: Teach patient to arise slowly                   Problem: Patient Education: Go to Patient Education Activity  Goal: Patient/Family Education  Outcome: Progressing Towards Goal

## 2020-07-07 NOTE — PROGRESS NOTES
KRISTINA  Home - and is agreeable to home health if it is indicated; Patient does not feel that she will need any home health. Transportation will be provided at discharge by family    Preferred RX is [facility] on [address]    Will need 2nd IM letter prior to discharge. CM will continue to follow for discharge needs.     Jimbo Rawls, RN  Care Manager  Ext [phone]

## 2020-07-08 LAB
ALBUMIN SERPL-MCNC: 3.3 G/DL (ref 3.5–5)
ALBUMIN/GLOB SERPL: 0.8 {RATIO} (ref 1.1–2.2)
ALP SERPL-CCNC: 106 U/L (ref 45–117)
ALT SERPL-CCNC: 17 U/L (ref 12–78)
ANION GAP SERPL CALC-SCNC: 4 MMOL/L (ref 5–15)
AST SERPL-CCNC: 28 U/L (ref 15–37)
BASOPHILS # BLD: 0 K/UL (ref 0–0.1)
BASOPHILS NFR BLD: 0 % (ref 0–1)
BILIRUB SERPL-MCNC: 0.8 MG/DL (ref 0.2–1)
BLASTS NFR BLD MANUAL: 0 %
BUN SERPL-MCNC: 6 MG/DL (ref 6–20)
BUN/CREAT SERPL: 8 (ref 12–20)
CALCIUM SERPL-MCNC: 8.5 MG/DL (ref 8.5–10.1)
CHLORIDE SERPL-SCNC: 106 MMOL/L (ref 97–108)
CO2 SERPL-SCNC: 26 MMOL/L (ref 21–32)
CREAT SERPL-MCNC: 0.78 MG/DL (ref 0.55–1.02)
DIFFERENTIAL METHOD BLD: ABNORMAL
EOSINOPHIL # BLD: 0.3 K/UL (ref 0–0.4)
EOSINOPHIL NFR BLD: 4 % (ref 0–7)
ERYTHROCYTE [DISTWIDTH] IN BLOOD BY AUTOMATED COUNT: 13.5 % (ref 11.5–14.5)
GLOBULIN SER CALC-MCNC: 4.4 G/DL (ref 2–4)
GLUCOSE SERPL-MCNC: 83 MG/DL (ref 65–100)
HCT VFR BLD AUTO: 24.1 % (ref 35–47)
HGB BLD-MCNC: 8.7 G/DL (ref 11.5–16)
IMM GRANULOCYTES # BLD AUTO: 0 K/UL
IMM GRANULOCYTES NFR BLD AUTO: 0 %
LYMPHOCYTES # BLD: 5.3 K/UL (ref 0.8–3.5)
LYMPHOCYTES NFR BLD: 65 % (ref 12–49)
MCH RBC QN AUTO: 41.6 PG (ref 26–34)
MCHC RBC AUTO-ENTMCNC: 36.1 G/DL (ref 30–36.5)
MCV RBC AUTO: 115.3 FL (ref 80–99)
METAMYELOCYTES NFR BLD MANUAL: 0 %
MONOCYTES # BLD: 0.7 K/UL (ref 0–1)
MONOCYTES NFR BLD: 8 % (ref 5–13)
MYELOCYTES NFR BLD MANUAL: 0 %
NEUTS BAND NFR BLD MANUAL: 0 % (ref 0–6)
NEUTS SEG # BLD: 1.9 K/UL (ref 1.8–8)
NEUTS SEG NFR BLD: 23 % (ref 32–75)
NRBC # BLD: 0.54 K/UL (ref 0–0.01)
NRBC BLD-RTO: 6.6 PER 100 WBC
OTHER CELLS NFR BLD MANUAL: 0 %
PLATELET # BLD AUTO: 258 K/UL (ref 150–400)
PMV BLD AUTO: 12.1 FL (ref 8.9–12.9)
POTASSIUM SERPL-SCNC: 4.1 MMOL/L (ref 3.5–5.1)
PROMYELOCYTES NFR BLD MANUAL: 0 %
PROT SERPL-MCNC: 7.7 G/DL (ref 6.4–8.2)
RBC # BLD AUTO: 2.09 M/UL (ref 3.8–5.2)
RBC MORPH BLD: ABNORMAL
RBC MORPH BLD: ABNORMAL
SODIUM SERPL-SCNC: 136 MMOL/L (ref 136–145)
WBC # BLD AUTO: 8.2 K/UL (ref 3.6–11)

## 2020-07-08 PROCEDURE — 94760 N-INVAS EAR/PLS OXIMETRY 1: CPT

## 2020-07-08 PROCEDURE — 74011000258 HC RX REV CODE- 258: Performed by: INTERNAL MEDICINE

## 2020-07-08 PROCEDURE — 74011250636 HC RX REV CODE- 250/636: Performed by: INTERNAL MEDICINE

## 2020-07-08 PROCEDURE — 36415 COLL VENOUS BLD VENIPUNCTURE: CPT

## 2020-07-08 PROCEDURE — 80053 COMPREHEN METABOLIC PANEL: CPT

## 2020-07-08 PROCEDURE — 74011250637 HC RX REV CODE- 250/637: Performed by: INTERNAL MEDICINE

## 2020-07-08 PROCEDURE — 65660000000 HC RM CCU STEPDOWN

## 2020-07-08 PROCEDURE — 85027 COMPLETE CBC AUTOMATED: CPT

## 2020-07-08 RX ADMIN — HYDROMORPHONE HYDROCHLORIDE 1 MG: 1 INJECTION, SOLUTION INTRAMUSCULAR; INTRAVENOUS; SUBCUTANEOUS at 02:07

## 2020-07-08 RX ADMIN — DIPHENHYDRAMINE HYDROCHLORIDE 25 MG: 25 CAPSULE ORAL at 00:57

## 2020-07-08 RX ADMIN — ONDANSETRON 4 MG: 2 INJECTION INTRAMUSCULAR; INTRAVENOUS at 00:57

## 2020-07-08 RX ADMIN — ONDANSETRON 4 MG: 2 INJECTION INTRAMUSCULAR; INTRAVENOUS at 18:45

## 2020-07-08 RX ADMIN — HEPARIN SODIUM 5000 UNITS: 5000 INJECTION INTRAVENOUS; SUBCUTANEOUS at 05:09

## 2020-07-08 RX ADMIN — ONDANSETRON 4 MG: 2 INJECTION INTRAMUSCULAR; INTRAVENOUS at 14:03

## 2020-07-08 RX ADMIN — HYDROMORPHONE HYDROCHLORIDE 1 MG: 1 INJECTION, SOLUTION INTRAMUSCULAR; INTRAVENOUS; SUBCUTANEOUS at 18:45

## 2020-07-08 RX ADMIN — DIPHENHYDRAMINE HYDROCHLORIDE 25 MG: 25 CAPSULE ORAL at 14:03

## 2020-07-08 RX ADMIN — LOSARTAN POTASSIUM 50 MG: 25 TABLET, FILM COATED ORAL at 21:51

## 2020-07-08 RX ADMIN — DIPHENHYDRAMINE HYDROCHLORIDE 25 MG: 25 CAPSULE ORAL at 18:45

## 2020-07-08 RX ADMIN — Medication 10 ML: at 05:09

## 2020-07-08 RX ADMIN — CITALOPRAM HYDROBROMIDE 10 MG: 20 TABLET ORAL at 21:50

## 2020-07-08 RX ADMIN — HYDROXYUREA 500 MG: 500 CAPSULE ORAL at 09:40

## 2020-07-08 RX ADMIN — DIPHENHYDRAMINE HYDROCHLORIDE 25 MG: 25 CAPSULE ORAL at 09:35

## 2020-07-08 RX ADMIN — HYDROMORPHONE HYDROCHLORIDE 1 MG: 1 INJECTION, SOLUTION INTRAMUSCULAR; INTRAVENOUS; SUBCUTANEOUS at 09:35

## 2020-07-08 RX ADMIN — HYDROMORPHONE HYDROCHLORIDE 1 MG: 1 INJECTION, SOLUTION INTRAMUSCULAR; INTRAVENOUS; SUBCUTANEOUS at 15:44

## 2020-07-08 RX ADMIN — Medication 1 CAPSULE: at 09:35

## 2020-07-08 RX ADMIN — HEPARIN SODIUM 5000 UNITS: 5000 INJECTION INTRAVENOUS; SUBCUTANEOUS at 14:03

## 2020-07-08 RX ADMIN — HYDROMORPHONE HYDROCHLORIDE 1 MG: 1 INJECTION, SOLUTION INTRAMUSCULAR; INTRAVENOUS; SUBCUTANEOUS at 12:35

## 2020-07-08 RX ADMIN — POTASSIUM CHLORIDE: 2 INJECTION, SOLUTION, CONCENTRATE INTRAVENOUS at 14:13

## 2020-07-08 RX ADMIN — HYDROXYUREA 500 MG: 500 CAPSULE ORAL at 17:28

## 2020-07-08 RX ADMIN — ONDANSETRON 4 MG: 2 INJECTION INTRAMUSCULAR; INTRAVENOUS at 05:08

## 2020-07-08 RX ADMIN — HYDROMORPHONE HYDROCHLORIDE 1 MG: 1 INJECTION, SOLUTION INTRAMUSCULAR; INTRAVENOUS; SUBCUTANEOUS at 05:08

## 2020-07-08 RX ADMIN — HYDROMORPHONE HYDROCHLORIDE 1 MG: 1 INJECTION, SOLUTION INTRAMUSCULAR; INTRAVENOUS; SUBCUTANEOUS at 21:51

## 2020-07-08 RX ADMIN — PANTOPRAZOLE SODIUM 40 MG: 40 TABLET, DELAYED RELEASE ORAL at 21:51

## 2020-07-08 RX ADMIN — DIPHENHYDRAMINE HYDROCHLORIDE 25 MG: 25 CAPSULE ORAL at 05:08

## 2020-07-08 RX ADMIN — ONDANSETRON 4 MG: 2 INJECTION INTRAMUSCULAR; INTRAVENOUS at 09:35

## 2020-07-08 RX ADMIN — Medication 10 ML: at 14:05

## 2020-07-08 RX ADMIN — POTASSIUM CHLORIDE: 2 INJECTION, SOLUTION, CONCENTRATE INTRAVENOUS at 03:09

## 2020-07-08 RX ADMIN — HEPARIN SODIUM 5000 UNITS: 5000 INJECTION INTRAVENOUS; SUBCUTANEOUS at 21:51

## 2020-07-08 NOTE — PROGRESS NOTES
* No surgery found *  * No surgery found *  Bedside shift change report given to Drew Corral RN (oncoming nurse) by Kasie Ferrera RN (offgoing nurse). Report included the following information SBAR and Kardex. Zone Phone:   [phone]    Significant changes during shift:  Continued pain management and IV fluids    Patient Information    Melida Mandujano  64 y.o.  7/4/2020  6:52 AM by Ame Tran MD. Melida Mandujano was admitted from Home    Problem List    Patient Active Problem List    Diagnosis Date Noted    AVN of femur ([facility] 01/25/2020     Priority: 3 - Three    Hypokalemia 01/25/2020     Priority: 3 - Three    Viral gastritis 07/06/2020    Dehydration 07/06/2020    UTI (urinary tract infection) 07/04/2020    Acute gastroenteritis 10/27/2019    Class 1 obesity due to excess calories with serious comorbidity and body mass index (BMI) of 30.0 to 30.9 in adult 08/13/2019    Sickle cell disease ([facility] 02/10/2019    Overweight (BMI 25.0-29.9) 02/07/2019    Sickle cell pain crisis ([facility] 09/08/2018    Carpal tunnel syndrome of right wrist 06/12/2018    Pulsatile tinnitus 10/31/2017    Sickle cell crisis ([facility] 05/22/2017    Depression 10/05/2014    Hypertension 10/03/2014    Venous insufficiency 10/03/2014     Past Medical History:   Diagnosis Date    Anemia     SC hemoglobinopathy    Chronic pain     Depression     Hypertension     Liver disease     hepatitis C; pt reports \"cured\"    Sickle cell disease ([facility]      Core Measures:    CVA: No No  CHF:No No  PNA:No No    Activity Status:    OOB to Chair No  Ambulated this shift Yes   Bed Rest No    DVT prophylaxis:    DVT prophylaxis Med- Yes  DVT prophylaxis SCD or SARA- No     Patient Safety:    Falls Score Total Score: 1  Safety Level_______  Bed Alarm On? No  Sitter?  No    Plan for upcoming shift: D/c home with family     Discharge Plan: Yes home    Active Consults:  None

## 2020-07-08 NOTE — PROGRESS NOTES
3:05pm:  Patient will need further testing on his covid sample before determination can be made on result. Care Manager called [facility] and was not able to leave a voice message because mail box was full. [facility] was notified that discharge will not be today. Nurse Merlin Piccolo also informed. [facility] contacted. KRISTINA Home - and is agreeable to home health if it is indicated; Patient does not feel that she will need any home health. 
  
Transportation will be provided at discharge by family 
  
[facility] [id number] 
Will need 2nd IM letter prior to discharge. 
  
CM will continue to follow for discharge needs. 
  
Doug Robles, SAMIR Care Manager Ext [id number] Island Pedicle Flap Text: The defect edges were debeveled with a #15 scalpel blade.  Given the location of the defect, shape of the defect and the proximity to free margins an island pedicle advancement flap was deemed most appropriate.  Using a sterile surgical marker, an appropriate advancement flap was drawn incorporating the defect, outlining the appropriate donor tissue and placing the expected incisions within the relaxed skin tension lines where possible.    The area thus outlined was incised deep to adipose tissue with a #15 scalpel blade.  The skin margins were undermined to an appropriate distance in all directions around the primary defect and laterally outward around the island pedicle utilizing iris scissors.  There was minimal undermining beneath the pedicle flap.

## 2020-07-08 NOTE — PROGRESS NOTES
General Daily Progress Note    Admit Date: 7/4/2020    Subjective:     Patient continues to complain of pain. .     Current Facility-Administered Medications   Medication Dose Route Frequency    HYDROmorphone (PF) (DILAUDID) injection 1 mg  1 mg IntraVENous Q3H PRN    lactobac ac& pc-s.therm-b.anim (CARLITOS Q/RISAQUAD)  1 Cap Oral DAILY    dextrose 5 % - 0.45% NaCl 1,000 mL with potassium chloride 30 mEq infusion   IntraVENous CONTINUOUS    fentaNYL (DURAGESIC) 75 mcg/hr patch 1 Patch  1 Patch TransDERmal Q72H    sodium chloride (NS) flush 5-10 mL  5-10 mL IntraVENous PRN    citalopram (CELEXA) tablet 10 mg  10 mg Oral QHS    hydroxyurea (HYDREA) chemo cap 500 mg  500 mg Oral BID    pantoprazole (PROTONIX) tablet 40 mg  40 mg Oral QHS    losartan (COZAAR) tablet 50 mg  50 mg Oral QHS    sodium chloride (NS) flush 5-40 mL  5-40 mL IntraVENous Q8H    sodium chloride (NS) flush 5-40 mL  5-40 mL IntraVENous PRN    acetaminophen (TYLENOL) tablet 650 mg  650 mg Oral Q6H PRN    docusate sodium (COLACE) capsule 100 mg  100 mg Oral DAILY PRN    heparin (porcine) injection 5,000 Units  5,000 Units SubCUTAneous Q8H    diphenhydrAMINE (BENADRYL) capsule 25 mg  25 mg Oral Q4H PRN    ondansetron (ZOFRAN) injection 4 mg  4 mg IntraVENous Q4H PRN        Review of Systems  A comprehensive review of systems was negative. Objective:     Patient Vitals for the past 24 hrs:   BP Temp Pulse Resp SpO2 Weight   07/08/20 0634 135/82 98.7 °F (37.1 °C) 78 16 100 %    07/08/20 0400   88      07/08/20 0308 140/78 98.9 °F (37.2 °C) 79 16 98 %    07/08/20 0004 136/83 98.9 °F (37.2 °C) 81 16 98 %    07/08/20 0000   79      07/07/20 2000   83      07/07/20 1853 141/84 98.7 °F (37.1 °C) 82 18 99 %    07/07/20 1559      209 lb 11.2 oz (95.1 kg)   07/07/20 1528 127/69 98.9 °F (37.2 °C) 77 18 99 %    07/07/20 1132 (!) 144/93 98.7 °F (37.1 °C) 84 18 98 %      No intake/output data recorded.   07/06 1901 - 07/08 0700  In: 440 [P.O.:440]  Out: -     Physical Exam:   Visit Vitals  /82   Pulse 78   Temp 98.7 °F (37.1 °C)   Resp 16   Ht 5' 10\" (1.778 m)   Wt 209 lb 11.2 oz (95.1 kg)   SpO2 100%   Breastfeeding No   BMI 30.09 kg/m²     General appearance: alert, cooperative, no distress, appears stated age  Neck: supple, symmetrical, trachea midline, no adenopathy, thyroid: not enlarged, symmetric, no tenderness/mass/nodules, no carotid bruit and no JVD  Lungs: clear to auscultation bilaterally  Heart: regular rate and rhythm, S1, S2 normal, no murmur, click, rub or gallop  Abdomen: soft, non-tender. Bowel sounds normal. No masses,  no organomegaly  Extremities: extremities normal, atraumatic, no cyanosis or edema    Assessment:     Active Problems:    Sickle cell crisis ([facility] (5/22/2017)      UTI (urinary tract infection) (7/4/2020)      Viral gastritis (7/6/2020)      Dehydration (7/6/2020)        Plan:     1. Continues to complain of pain will therefore continue analgesics as prescribed. 2.  Pulmonary status remains stable with analgesic usage. 3.  Hydration continues and will mobilize.

## 2020-07-08 NOTE — PROGRESS NOTES
Problem: Falls - Risk of  Goal: *Absence of Falls  Description: Document Adelita Hussein Fall Risk and appropriate interventions in the flowsheet.   Outcome: Progressing Towards Goal  Note: Fall Risk Interventions:            Medication Interventions: Teach patient to arise slowly, Patient to call before getting OOB

## 2020-07-09 LAB
ALBUMIN SERPL-MCNC: 3.3 G/DL (ref 3.5–5)
ALBUMIN/GLOB SERPL: 0.7 {RATIO} (ref 1.1–2.2)
ALP SERPL-CCNC: 100 U/L (ref 45–117)
ALT SERPL-CCNC: 20 U/L (ref 12–78)
ANION GAP SERPL CALC-SCNC: 3 MMOL/L (ref 5–15)
AST SERPL-CCNC: 30 U/L (ref 15–37)
BASOPHILS # BLD: 0 K/UL (ref 0–0.1)
BASOPHILS NFR BLD: 0 % (ref 0–1)
BILIRUB SERPL-MCNC: 0.7 MG/DL (ref 0.2–1)
BLASTS NFR BLD MANUAL: 0 %
BUN SERPL-MCNC: 6 MG/DL (ref 6–20)
BUN/CREAT SERPL: 8 (ref 12–20)
CALCIUM SERPL-MCNC: 8.6 MG/DL (ref 8.5–10.1)
CHLORIDE SERPL-SCNC: 106 MMOL/L (ref 97–108)
CO2 SERPL-SCNC: 27 MMOL/L (ref 21–32)
CREAT SERPL-MCNC: 0.76 MG/DL (ref 0.55–1.02)
DIFFERENTIAL METHOD BLD: ABNORMAL
EOSINOPHIL # BLD: 0.4 K/UL (ref 0–0.4)
EOSINOPHIL NFR BLD: 6 % (ref 0–7)
ERYTHROCYTE [DISTWIDTH] IN BLOOD BY AUTOMATED COUNT: 13.4 % (ref 11.5–14.5)
GLOBULIN SER CALC-MCNC: 4.6 G/DL (ref 2–4)
GLUCOSE SERPL-MCNC: 85 MG/DL (ref 65–100)
HCT VFR BLD AUTO: 24.7 % (ref 35–47)
HGB BLD-MCNC: 8.9 G/DL (ref 11.5–16)
IMM GRANULOCYTES # BLD AUTO: 0 K/UL
IMM GRANULOCYTES NFR BLD AUTO: 0 %
LYMPHOCYTES # BLD: 4.5 K/UL (ref 0.8–3.5)
LYMPHOCYTES NFR BLD: 62 % (ref 12–49)
MCH RBC QN AUTO: 41 PG (ref 26–34)
MCHC RBC AUTO-ENTMCNC: 36 G/DL (ref 30–36.5)
MCV RBC AUTO: 113.8 FL (ref 80–99)
METAMYELOCYTES NFR BLD MANUAL: 0 %
MONOCYTES # BLD: 0.6 K/UL (ref 0–1)
MONOCYTES NFR BLD: 8 % (ref 5–13)
MYELOCYTES NFR BLD MANUAL: 0 %
NEUTS BAND NFR BLD MANUAL: 0 % (ref 0–6)
NEUTS SEG # BLD: 1.7 K/UL (ref 1.8–8)
NEUTS SEG NFR BLD: 24 % (ref 32–75)
NRBC # BLD: 0.59 K/UL (ref 0–0.01)
NRBC BLD-RTO: 8.2 PER 100 WBC
OTHER CELLS NFR BLD MANUAL: 0 %
PLATELET # BLD AUTO: 263 K/UL (ref 150–400)
PMV BLD AUTO: 11.8 FL (ref 8.9–12.9)
POTASSIUM SERPL-SCNC: 4.2 MMOL/L (ref 3.5–5.1)
PROMYELOCYTES NFR BLD MANUAL: 0 %
PROT SERPL-MCNC: 7.9 G/DL (ref 6.4–8.2)
RBC # BLD AUTO: 2.17 M/UL (ref 3.8–5.2)
RBC MORPH BLD: ABNORMAL
RBC MORPH BLD: ABNORMAL
SODIUM SERPL-SCNC: 136 MMOL/L (ref 136–145)
WBC # BLD AUTO: 7.2 K/UL (ref 3.6–11)

## 2020-07-09 PROCEDURE — 74011250637 HC RX REV CODE- 250/637: Performed by: INTERNAL MEDICINE

## 2020-07-09 PROCEDURE — 65660000000 HC RM CCU STEPDOWN

## 2020-07-09 PROCEDURE — 80053 COMPREHEN METABOLIC PANEL: CPT

## 2020-07-09 PROCEDURE — 36415 COLL VENOUS BLD VENIPUNCTURE: CPT

## 2020-07-09 PROCEDURE — 74011000258 HC RX REV CODE- 258: Performed by: INTERNAL MEDICINE

## 2020-07-09 PROCEDURE — 94760 N-INVAS EAR/PLS OXIMETRY 1: CPT

## 2020-07-09 PROCEDURE — 85027 COMPLETE CBC AUTOMATED: CPT

## 2020-07-09 PROCEDURE — 74011250636 HC RX REV CODE- 250/636: Performed by: INTERNAL MEDICINE

## 2020-07-09 RX ADMIN — DIPHENHYDRAMINE HYDROCHLORIDE 25 MG: 25 CAPSULE ORAL at 10:52

## 2020-07-09 RX ADMIN — POTASSIUM CHLORIDE: 2 INJECTION, SOLUTION, CONCENTRATE INTRAVENOUS at 22:47

## 2020-07-09 RX ADMIN — CITALOPRAM HYDROBROMIDE 10 MG: 20 TABLET ORAL at 21:27

## 2020-07-09 RX ADMIN — PANTOPRAZOLE SODIUM 40 MG: 40 TABLET, DELAYED RELEASE ORAL at 21:27

## 2020-07-09 RX ADMIN — HYDROMORPHONE HYDROCHLORIDE 1 MG: 1 INJECTION, SOLUTION INTRAMUSCULAR; INTRAVENOUS; SUBCUTANEOUS at 06:51

## 2020-07-09 RX ADMIN — Medication 1 CAPSULE: at 10:10

## 2020-07-09 RX ADMIN — HEPARIN SODIUM 5000 UNITS: 5000 INJECTION INTRAVENOUS; SUBCUTANEOUS at 06:51

## 2020-07-09 RX ADMIN — ONDANSETRON 4 MG: 2 INJECTION INTRAMUSCULAR; INTRAVENOUS at 10:52

## 2020-07-09 RX ADMIN — ONDANSETRON 4 MG: 2 INJECTION INTRAMUSCULAR; INTRAVENOUS at 13:47

## 2020-07-09 RX ADMIN — DOCUSATE SODIUM 100 MG: 100 CAPSULE, LIQUID FILLED ORAL at 21:32

## 2020-07-09 RX ADMIN — POTASSIUM CHLORIDE: 2 INJECTION, SOLUTION, CONCENTRATE INTRAVENOUS at 00:50

## 2020-07-09 RX ADMIN — HYDROMORPHONE HYDROCHLORIDE 1 MG: 1 INJECTION, SOLUTION INTRAMUSCULAR; INTRAVENOUS; SUBCUTANEOUS at 17:45

## 2020-07-09 RX ADMIN — Medication 10 ML: at 21:29

## 2020-07-09 RX ADMIN — ONDANSETRON 4 MG: 2 INJECTION INTRAMUSCULAR; INTRAVENOUS at 00:46

## 2020-07-09 RX ADMIN — ONDANSETRON 4 MG: 2 INJECTION INTRAMUSCULAR; INTRAVENOUS at 21:29

## 2020-07-09 RX ADMIN — LOSARTAN POTASSIUM 50 MG: 25 TABLET, FILM COATED ORAL at 21:26

## 2020-07-09 RX ADMIN — HEPARIN SODIUM 5000 UNITS: 5000 INJECTION INTRAVENOUS; SUBCUTANEOUS at 13:48

## 2020-07-09 RX ADMIN — HEPARIN SODIUM 5000 UNITS: 5000 INJECTION INTRAVENOUS; SUBCUTANEOUS at 21:28

## 2020-07-09 RX ADMIN — HYDROMORPHONE HYDROCHLORIDE 1 MG: 1 INJECTION, SOLUTION INTRAMUSCULAR; INTRAVENOUS; SUBCUTANEOUS at 03:54

## 2020-07-09 RX ADMIN — ONDANSETRON 4 MG: 2 INJECTION INTRAMUSCULAR; INTRAVENOUS at 06:51

## 2020-07-09 RX ADMIN — HYDROMORPHONE HYDROCHLORIDE 1 MG: 1 INJECTION, SOLUTION INTRAMUSCULAR; INTRAVENOUS; SUBCUTANEOUS at 13:47

## 2020-07-09 RX ADMIN — DIPHENHYDRAMINE HYDROCHLORIDE 25 MG: 25 CAPSULE ORAL at 13:48

## 2020-07-09 RX ADMIN — HYDROMORPHONE HYDROCHLORIDE 1 MG: 1 INJECTION, SOLUTION INTRAMUSCULAR; INTRAVENOUS; SUBCUTANEOUS at 21:28

## 2020-07-09 RX ADMIN — HYDROXYUREA 500 MG: 500 CAPSULE ORAL at 10:10

## 2020-07-09 RX ADMIN — HYDROMORPHONE HYDROCHLORIDE 1 MG: 1 INJECTION, SOLUTION INTRAMUSCULAR; INTRAVENOUS; SUBCUTANEOUS at 10:52

## 2020-07-09 RX ADMIN — DIPHENHYDRAMINE HYDROCHLORIDE 25 MG: 25 CAPSULE ORAL at 17:45

## 2020-07-09 RX ADMIN — Medication 10 ML: at 14:00

## 2020-07-09 RX ADMIN — DIPHENHYDRAMINE HYDROCHLORIDE 25 MG: 25 CAPSULE ORAL at 21:27

## 2020-07-09 RX ADMIN — HYDROMORPHONE HYDROCHLORIDE 1 MG: 1 INJECTION, SOLUTION INTRAMUSCULAR; INTRAVENOUS; SUBCUTANEOUS at 00:46

## 2020-07-09 RX ADMIN — ONDANSETRON 4 MG: 2 INJECTION INTRAMUSCULAR; INTRAVENOUS at 17:45

## 2020-07-09 RX ADMIN — Medication 10 ML: at 06:51

## 2020-07-09 RX ADMIN — DIPHENHYDRAMINE HYDROCHLORIDE 25 MG: 25 CAPSULE ORAL at 06:51

## 2020-07-09 RX ADMIN — DIPHENHYDRAMINE HYDROCHLORIDE 25 MG: 25 CAPSULE ORAL at 00:46

## 2020-07-09 NOTE — PROGRESS NOTES
General Daily Progress Note    Admit Date: 7/4/2020    Subjective:     Patient continues to complain of pain. .     Current Facility-Administered Medications   Medication Dose Route Frequency    HYDROmorphone (PF) (DILAUDID) injection 1 mg  1 mg IntraVENous Q3H PRN    lactobac ac& pc-s.therm-b.anim (CARLITOS Q/RISAQUAD)  1 Cap Oral DAILY    dextrose 5 % - 0.45% NaCl 1,000 mL with potassium chloride 30 mEq infusion   IntraVENous CONTINUOUS    fentaNYL (DURAGESIC) 75 mcg/hr patch 1 Patch  1 Patch TransDERmal Q72H    sodium chloride (NS) flush 5-10 mL  5-10 mL IntraVENous PRN    citalopram (CELEXA) tablet 10 mg  10 mg Oral QHS    hydroxyurea (HYDREA) chemo cap 500 mg  500 mg Oral BID    pantoprazole (PROTONIX) tablet 40 mg  40 mg Oral QHS    losartan (COZAAR) tablet 50 mg  50 mg Oral QHS    sodium chloride (NS) flush 5-40 mL  5-40 mL IntraVENous Q8H    sodium chloride (NS) flush 5-40 mL  5-40 mL IntraVENous PRN    acetaminophen (TYLENOL) tablet 650 mg  650 mg Oral Q6H PRN    docusate sodium (COLACE) capsule 100 mg  100 mg Oral DAILY PRN    heparin (porcine) injection 5,000 Units  5,000 Units SubCUTAneous Q8H    diphenhydrAMINE (BENADRYL) capsule 25 mg  25 mg Oral Q4H PRN    ondansetron (ZOFRAN) injection 4 mg  4 mg IntraVENous Q4H PRN        Review of Systems  A comprehensive review of systems was negative. Objective:     Patient Vitals for the past 24 hrs:   BP Temp Pulse Resp SpO2   07/09/20 0800   84     07/09/20 0650 148/82 98.6 °F (37 °C) 85 18 98 %   07/09/20 0355 150/89 98.4 °F (36.9 °C) 82 18 98 %   07/08/20 2353 138/79 98.6 °F (37 °C) 80 18 98 %   07/08/20 1903 146/89 100 °F (37.8 °C) 85 18 100 %   07/08/20 1530 141/77 99.5 °F (37.5 °C) 91 18 100 %   07/08/20 1155 132/74 98.7 °F (37.1 °C) 82 18 98 %     No intake/output data recorded. No intake/output data recorded.     Physical Exam:   Visit Vitals  /82   Pulse 84   Temp 98.6 °F (37 °C)   Resp 18   Ht 5' 10\" (1.778 m)   Wt 209 lb 11.2 oz (95.1 kg)   SpO2 98%   Breastfeeding No   BMI 30.09 kg/m²     General appearance: alert, cooperative, no distress, appears stated age  Neck: supple, symmetrical, trachea midline, no adenopathy, thyroid: not enlarged, symmetric, no tenderness/mass/nodules, no carotid bruit and no JVD  Lungs: clear to auscultation bilaterally  Heart: regular rate and rhythm, S1, S2 normal, no murmur, click, rub or gallop  Abdomen: soft, non-tender. Bowel sounds normal. No masses,  no organomegaly  Extremities: extremities normal, atraumatic, no cyanosis or edema    Assessment:     Active Problems:    Sickle cell crisis ([facility] [address] (5/22/2017)      UTI (urinary tract infection) (7/4/2020)      Viral gastritis (7/6/2020)      Dehydration (7/6/2020)        Plan:     1. Continue treatment of painful crises. Slow improvement. 2.  No evidence of an infection. 3.  Hydration continues. 4.  Will mobilize.

## 2020-07-09 NOTE — PROGRESS NOTES
* No surgery found *  * No surgery found *  Bedside shift change report [facility] by Sonnie Dancer, RN (offgoing nurse). Report included the following information SBAR and Kardex. Zone Phone:   [phone]    Significant changes during shift:  Continued pain management- frequency increased    Patient Information    Viola Gardner  64 y.o.  7/4/2020  6:52 AM by Nasim Samuel MD. Viola Gardner was admitted from Home    Problem List    Patient Active Problem List    Diagnosis Date Noted    AVN of femur ([address] 01/25/2020     Priority: 3 - Three    Hypokalemia 01/25/2020     Priority: 3 - Three    Viral gastritis 07/06/2020    Dehydration 07/06/2020    UTI (urinary tract infection) 07/04/2020    Acute gastroenteritis 10/27/2019    Class 1 obesity due to excess calories with serious comorbidity and body mass index (BMI) of 30.0 to 30.9 in adult 08/13/2019    Sickle cell disease ([facility] [address] 02/10/2019    Overweight (BMI 25.0-29.9) 02/07/2019    Sickle cell pain crisis ([address] 09/08/2018    Carpal tunnel syndrome of right wrist 06/12/2018    Pulsatile tinnitus 10/31/2017    Sickle cell crisis ([address] 05/22/2017    Depression 10/05/2014    Hypertension 10/03/2014    Venous insufficiency 10/03/2014     Past Medical History:   Diagnosis Date    Anemia     SC hemoglobinopathy    Chronic pain     Depression     Hypertension     Liver disease     hepatitis C; pt reports \"cured\"    Sickle cell disease ([facility] [address]      Core Measures:    CVA: No No  CHF:No No  PNA:No No    Activity Status:    OOB to Chair No  Ambulated this shift Yes   Bed Rest No    DVT prophylaxis:    DVT prophylaxis Med- Yes  DVT prophylaxis SCD or SARA- No     Patient Safety:    Falls Score Total Score: 1  Safety Level_______  Bed Alarm On? No  Sitter?  No    Plan for upcoming shift: D/c home with family     Discharge Plan: Yes home    Active Consults:  None

## 2020-07-10 LAB
BACTERIA SPEC CULT: NORMAL
BACTERIA SPEC CULT: NORMAL
SERVICE CMNT-IMP: NORMAL
SERVICE CMNT-IMP: NORMAL

## 2020-07-10 PROCEDURE — 65660000000 HC RM CCU STEPDOWN

## 2020-07-10 PROCEDURE — 74011000258 HC RX REV CODE- 258: Performed by: INTERNAL MEDICINE

## 2020-07-10 PROCEDURE — 74011250637 HC RX REV CODE- 250/637: Performed by: INTERNAL MEDICINE

## 2020-07-10 PROCEDURE — 74011250636 HC RX REV CODE- 250/636: Performed by: INTERNAL MEDICINE

## 2020-07-10 PROCEDURE — 94760 N-INVAS EAR/PLS OXIMETRY 1: CPT

## 2020-07-10 RX ADMIN — DIPHENHYDRAMINE HYDROCHLORIDE 25 MG: 25 CAPSULE ORAL at 15:29

## 2020-07-10 RX ADMIN — ONDANSETRON 4 MG: 2 INJECTION INTRAMUSCULAR; INTRAVENOUS at 15:29

## 2020-07-10 RX ADMIN — ONDANSETRON 4 MG: 2 INJECTION INTRAMUSCULAR; INTRAVENOUS at 04:51

## 2020-07-10 RX ADMIN — HEPARIN SODIUM 5000 UNITS: 5000 INJECTION INTRAVENOUS; SUBCUTANEOUS at 07:13

## 2020-07-10 RX ADMIN — HYDROXYUREA 500 MG: 500 CAPSULE ORAL at 10:50

## 2020-07-10 RX ADMIN — ONDANSETRON 4 MG: 2 INJECTION INTRAMUSCULAR; INTRAVENOUS at 19:56

## 2020-07-10 RX ADMIN — Medication 1 CAPSULE: at 10:51

## 2020-07-10 RX ADMIN — HYDROMORPHONE HYDROCHLORIDE 1 MG: 1 INJECTION, SOLUTION INTRAMUSCULAR; INTRAVENOUS; SUBCUTANEOUS at 01:15

## 2020-07-10 RX ADMIN — HYDROMORPHONE HYDROCHLORIDE 1 MG: 1 INJECTION, SOLUTION INTRAMUSCULAR; INTRAVENOUS; SUBCUTANEOUS at 10:50

## 2020-07-10 RX ADMIN — HEPARIN SODIUM 5000 UNITS: 5000 INJECTION INTRAVENOUS; SUBCUTANEOUS at 15:29

## 2020-07-10 RX ADMIN — HEPARIN SODIUM 5000 UNITS: 5000 INJECTION INTRAVENOUS; SUBCUTANEOUS at 21:27

## 2020-07-10 RX ADMIN — CITALOPRAM HYDROBROMIDE 10 MG: 20 TABLET ORAL at 21:27

## 2020-07-10 RX ADMIN — HYDROMORPHONE HYDROCHLORIDE 1 MG: 1 INJECTION, SOLUTION INTRAMUSCULAR; INTRAVENOUS; SUBCUTANEOUS at 15:29

## 2020-07-10 RX ADMIN — LOSARTAN POTASSIUM 50 MG: 25 TABLET, FILM COATED ORAL at 21:26

## 2020-07-10 RX ADMIN — HYDROXYUREA 500 MG: 500 CAPSULE ORAL at 19:33

## 2020-07-10 RX ADMIN — DOCUSATE SODIUM 100 MG: 100 CAPSULE, LIQUID FILLED ORAL at 19:56

## 2020-07-10 RX ADMIN — DIPHENHYDRAMINE HYDROCHLORIDE 25 MG: 25 CAPSULE ORAL at 04:52

## 2020-07-10 RX ADMIN — DIPHENHYDRAMINE HYDROCHLORIDE 25 MG: 25 CAPSULE ORAL at 19:56

## 2020-07-10 RX ADMIN — DIPHENHYDRAMINE HYDROCHLORIDE 25 MG: 25 CAPSULE ORAL at 10:51

## 2020-07-10 RX ADMIN — ONDANSETRON 4 MG: 2 INJECTION INTRAMUSCULAR; INTRAVENOUS at 10:50

## 2020-07-10 RX ADMIN — HYDROMORPHONE HYDROCHLORIDE 1 MG: 1 INJECTION, SOLUTION INTRAMUSCULAR; INTRAVENOUS; SUBCUTANEOUS at 19:56

## 2020-07-10 RX ADMIN — Medication 10 ML: at 07:14

## 2020-07-10 RX ADMIN — HYDROMORPHONE HYDROCHLORIDE 1 MG: 1 INJECTION, SOLUTION INTRAMUSCULAR; INTRAVENOUS; SUBCUTANEOUS at 04:51

## 2020-07-10 RX ADMIN — POTASSIUM CHLORIDE: 2 INJECTION, SOLUTION, CONCENTRATE INTRAVENOUS at 12:41

## 2020-07-10 RX ADMIN — HYDROMORPHONE HYDROCHLORIDE 1 MG: 1 INJECTION, SOLUTION INTRAMUSCULAR; INTRAVENOUS; SUBCUTANEOUS at 23:48

## 2020-07-10 RX ADMIN — PANTOPRAZOLE SODIUM 40 MG: 40 TABLET, DELAYED RELEASE ORAL at 21:27

## 2020-07-10 RX ADMIN — Medication 10 ML: at 21:36

## 2020-07-10 NOTE — PROGRESS NOTES
Problem: Falls - Risk of  Goal: *Absence of Falls  Description: Document Leah Martinez Fall Risk and appropriate interventions in the flowsheet.   Outcome: Progressing Towards Goal  Note: Fall Risk Interventions:            Medication Interventions: Assess postural VS orthostatic hypotension                   Problem: Patient Education: Go to Patient Education Activity  Goal: Patient/Family Education  Outcome: Progressing Towards Goal

## 2020-07-10 NOTE — PROGRESS NOTES
KRISTINA PLAN:    1) Home with family at discharge - family to provide transportation    2) Follow-up appts prior to discharge    3) 2nd IM prior to discharge    Tiffanie Cross RN, BSN, [address] [place] [address]    Coordinator  [phone]

## 2020-07-10 NOTE — PROGRESS NOTES
Patient did not receive scheduled 1800 chemo med hydroxyurea. Pharmacy called and spoke to Mariaelena. He advised that med be given now and that time will be adjusted to 9 am and 9 pm. Med will be administered at this time.

## 2020-07-10 NOTE — PROGRESS NOTES
Comprehensive Nutrition Assessment    Type and Reason for Visit: SUDHIR nutrition re-screen/LOS, Initial    Nutrition Recommendations/Plan:   Continue Regular diet     Nutrition Assessment:     Patient medically noted for sickle cell crisis, UTI, gastritis, and HTN. Chart reviewed for length of stay. She reports a good appetite and eating well. Meals are good. Menu at bedside. She had no nutrition questions/concerns. Encouraged intake of meals. Estimated Daily Nutrient Needs:  Energy (kcal):  1850 (BMR (1615) x 1. 3AF -250kcal)  Protein (g):  76-95g (0.8-1.0 g/kg bw)       Fluid (ml/day):  1850 mL      Wounds:    None       Current Nutrition Therapies:   DIET REGULAR    Anthropometric Measures:  · Height:  5' 10\" (177.8 cm)  · Current Body Wt:  95.1 kg (209 lb 10.5 oz)   · BMI Categories:  Obese class 1 (BMI 30.0-34. 9)       Nutrition Diagnosis:   No nutritional diagnosis at this time    Nutrition Interventions:   Food and/or Nutrient Delivery: Continue current diet  Nutrition Education and Counseling: No recommendations at this time  Coordination of Nutrition Care: No recommendation at this time    Goals:  PO intake >75% of meals next 5-7 days       Nutrition Monitoring and Evaluation:   Food/Nutrient Intake Outcomes: Food and nutrient intake  Physical Signs/Symptoms Outcomes: Weight    Discharge Planning:    Continue current diet     Electronically signed by Kat Brennan RD on 7/10/2020 at 11:21 AM    Contact: Mike Xiao  Pager [phone]

## 2020-07-10 NOTE — PROGRESS NOTES
Bedside shift change report given to [facility] (oncoming nurse) by Des Hunt (offgoing nurse). Report included the following information SBAR and Kardex.     Zone Phone:   [phone]     Significant changes during shift:  Continue pain management     Patient Information     Dakotah Mclean  64 y.o.  7/4/2020  6:52 AM by Maggie Sarah MD. Dakotah Mclean was admitted from Home     Problem List           Patient Active Problem List     Diagnosis Date Noted    AVN of femur ([address] 01/25/2020       Priority: 3 - Three    Hypokalemia 01/25/2020       Priority: 3 - Three    Viral gastritis 07/06/2020    Dehydration 07/06/2020    UTI (urinary tract infection) 07/04/2020    Acute gastroenteritis 10/27/2019    Class 1 obesity due to excess calories with serious comorbidity and body mass index (BMI) of 30.0 to 30.9 in adult 08/13/2019    Sickle cell disease ([address] 02/10/2019    Overweight (BMI 25.0-29.9) 02/07/2019    Sickle cell pain crisis ([address] 09/08/2018    Carpal tunnel syndrome of right wrist 06/12/2018    Pulsatile tinnitus 10/31/2017    Sickle cell crisis ([address] 05/22/2017    Depression 10/05/2014    Hypertension 10/03/2014    Venous insufficiency 10/03/2014          Past Medical History:   Diagnosis Date    Anemia       SC hemoglobinopathy    Chronic pain      Depression      Hypertension      Liver disease       hepatitis C; pt reports \"cured\"    Sickle cell disease ([address]       Core Measures:     CVA: No No  CHF:No No  PNA:No No     Activity Status:     OOB to Chair No  Ambulated this shift Yes   Bed Rest No     DVT prophylaxis:     DVT prophylaxis Med- Yes  DVT prophylaxis SCD or SARA- No      Patient Safety:     Falls Score Total Score: 1  Safety Level_______  Bed Alarm On? No  Sitter?  No     Plan for upcoming shift: D/c home with family      Discharge Plan: Yes home     Active Consults:  None

## 2020-07-10 NOTE — PROGRESS NOTES
General Daily Progress Note    Admit Date: 7/4/2020    Subjective:     Patient continues to complain of pain. .     Current Facility-Administered Medications   Medication Dose Route Frequency    HYDROmorphone (PF) (DILAUDID) injection 1 mg  1 mg IntraVENous Q3H PRN    lactobac ac& pc-s.therm-b.anim (CARLITOS Q/RISAQUAD)  1 Cap Oral DAILY    dextrose 5 % - 0.45% NaCl 1,000 mL with potassium chloride 30 mEq infusion   IntraVENous CONTINUOUS    fentaNYL (DURAGESIC) 75 mcg/hr patch 1 Patch  1 Patch TransDERmal Q72H    sodium chloride (NS) flush 5-10 mL  5-10 mL IntraVENous PRN    citalopram (CELEXA) tablet 10 mg  10 mg Oral QHS    hydroxyurea (HYDREA) chemo cap 500 mg  500 mg Oral BID    pantoprazole (PROTONIX) tablet 40 mg  40 mg Oral QHS    losartan (COZAAR) tablet 50 mg  50 mg Oral QHS    sodium chloride (NS) flush 5-40 mL  5-40 mL IntraVENous Q8H    sodium chloride (NS) flush 5-40 mL  5-40 mL IntraVENous PRN    acetaminophen (TYLENOL) tablet 650 mg  650 mg Oral Q6H PRN    docusate sodium (COLACE) capsule 100 mg  100 mg Oral DAILY PRN    heparin (porcine) injection 5,000 Units  5,000 Units SubCUTAneous Q8H    diphenhydrAMINE (BENADRYL) capsule 25 mg  25 mg Oral Q4H PRN    ondansetron (ZOFRAN) injection 4 mg  4 mg IntraVENous Q4H PRN        Review of Systems  A comprehensive review of systems was negative. Objective:     Patient Vitals for the past 24 hrs:   BP Temp Pulse Resp SpO2   07/10/20 0641 113/69 98.4 °F (36.9 °C) 76 18 97 %   07/10/20 0352 110/66 98.6 °F (37 °C) 82 18 100 %   07/09/20 2353 127/80 98.7 °F (37.1 °C) 80 18 97 %   07/09/20 2127    18    07/09/20 1921 140/84 99 °F (37.2 °C) 89 8 100 %   07/09/20 1601 106/77 99.1 °F (37.3 °C) 82 18 99 %   07/09/20 1600   82     07/09/20 1150   78     07/09/20 1113 126/84 98.5 °F (36.9 °C) 78 18 100 %     No intake/output data recorded. No intake/output data recorded.     Physical Exam:   Visit Vitals  /69   Pulse 76   Temp 98.4 °F (36.9 °C)   Resp 18   Ht 5' 10\" (1.778 m)   Wt 209 lb 11.2 oz (95.1 kg)   SpO2 97%   Breastfeeding No   BMI 30.09 kg/m²     General appearance: alert, cooperative, no distress, appears stated age  Neck: supple, symmetrical, trachea midline, no adenopathy, thyroid: not enlarged, symmetric, no tenderness/mass/nodules, no carotid bruit and no JVD  Lungs: clear to auscultation bilaterally  Heart: regular rate and rhythm, S1, S2 normal, no murmur, click, rub or gallop  Abdomen: soft, non-tender. Bowel sounds normal. No masses,  no organomegaly  Extremities: extremities normal, atraumatic, no cyanosis or edema    Assessment:     Active Problems:    Sickle cell crisis ([facility] [address] (5/22/2017)      UTI (urinary tract infection) (7/4/2020)      Viral gastritis (7/6/2020)      Dehydration (7/6/2020)        Plan:     1. Continue treatment of painful crises. Gradual improvement. Pain management is adequate. 2.  We will begin mobilization. 3.  Hydration continues.

## 2020-07-10 NOTE — PROGRESS NOTES
* No surgery found *  * No surgery found *  Bedside shift change report given to [facility]  (oncoming nurse) by Lindsay Cotto RN (offgoing nurse). Report included the following information SBAR and Kardex. Zone Phone:   [phone]    Significant changes during shift:  Continued pain management- frequency increased    Patient Information    Rj Hall  64 y.o.  7/4/2020  6:52 AM by Fan Sahni MD. Rj Hall was admitted from Home    Problem List    Patient Active Problem List    Diagnosis Date Noted    AVN of femur ([address] 01/25/2020     Priority: 3 - Three    Hypokalemia 01/25/2020     Priority: 3 - Three    Viral gastritis 07/06/2020    Dehydration 07/06/2020    UTI (urinary tract infection) 07/04/2020    Acute gastroenteritis 10/27/2019    Class 1 obesity due to excess calories with serious comorbidity and body mass index (BMI) of 30.0 to 30.9 in adult 08/13/2019    Sickle cell disease ([facility] [address] 02/10/2019    Overweight (BMI 25.0-29.9) 02/07/2019    Sickle cell pain crisis ([address] 09/08/2018    Carpal tunnel syndrome of right wrist 06/12/2018    Pulsatile tinnitus 10/31/2017    Sickle cell crisis ([address] 05/22/2017    Depression 10/05/2014    Hypertension 10/03/2014    Venous insufficiency 10/03/2014     Past Medical History:   Diagnosis Date    Anemia     SC hemoglobinopathy    Chronic pain     Depression     Hypertension     Liver disease     hepatitis C; pt reports \"cured\"    Sickle cell disease ([facility] [address]      Core Measures:    CVA: No No  CHF:No No  PNA:No No    Activity Status:    OOB to Chair No  Ambulated this shift Yes   Bed Rest No    DVT prophylaxis:    DVT prophylaxis Med- Yes  DVT prophylaxis SCD or SARA- No     Patient Safety:    Falls Score Total Score: 1  Safety Level_______  Bed Alarm On? No  Sitter?  No    Plan for upcoming shift: D/c home with family     Discharge Plan: Yes home    Active Consults:  None

## 2020-07-11 PROCEDURE — 65660000000 HC RM CCU STEPDOWN

## 2020-07-11 PROCEDURE — 74011250637 HC RX REV CODE- 250/637: Performed by: INTERNAL MEDICINE

## 2020-07-11 PROCEDURE — 74011250636 HC RX REV CODE- 250/636: Performed by: INTERNAL MEDICINE

## 2020-07-11 PROCEDURE — 94760 N-INVAS EAR/PLS OXIMETRY 1: CPT

## 2020-07-11 PROCEDURE — 74011250637 HC RX REV CODE- 250/637: Performed by: HOSPITALIST

## 2020-07-11 PROCEDURE — 74011000258 HC RX REV CODE- 258: Performed by: INTERNAL MEDICINE

## 2020-07-11 RX ORDER — HYDROMORPHONE HYDROCHLORIDE 2 MG/1
1 TABLET ORAL
Status: COMPLETED | OUTPATIENT
Start: 2020-07-11 | End: 2020-07-11

## 2020-07-11 RX ADMIN — CITALOPRAM HYDROBROMIDE 10 MG: 20 TABLET ORAL at 22:09

## 2020-07-11 RX ADMIN — Medication 1 CAPSULE: at 10:38

## 2020-07-11 RX ADMIN — HYDROMORPHONE HYDROCHLORIDE 1 MG: 1 INJECTION, SOLUTION INTRAMUSCULAR; INTRAVENOUS; SUBCUTANEOUS at 17:32

## 2020-07-11 RX ADMIN — Medication 10 ML: at 17:33

## 2020-07-11 RX ADMIN — PANTOPRAZOLE SODIUM 40 MG: 40 TABLET, DELAYED RELEASE ORAL at 22:29

## 2020-07-11 RX ADMIN — HYDROMORPHONE HYDROCHLORIDE 1 MG: 1 INJECTION, SOLUTION INTRAMUSCULAR; INTRAVENOUS; SUBCUTANEOUS at 04:05

## 2020-07-11 RX ADMIN — ONDANSETRON 4 MG: 2 INJECTION INTRAMUSCULAR; INTRAVENOUS at 17:32

## 2020-07-11 RX ADMIN — HYDROMORPHONE HYDROCHLORIDE 1 MG: 1 INJECTION, SOLUTION INTRAMUSCULAR; INTRAVENOUS; SUBCUTANEOUS at 14:23

## 2020-07-11 RX ADMIN — HYDROMORPHONE HYDROCHLORIDE 1 MG: 1 INJECTION, SOLUTION INTRAMUSCULAR; INTRAVENOUS; SUBCUTANEOUS at 19:59

## 2020-07-11 RX ADMIN — HEPARIN SODIUM 5000 UNITS: 5000 INJECTION INTRAVENOUS; SUBCUTANEOUS at 06:56

## 2020-07-11 RX ADMIN — Medication 10 ML: at 22:30

## 2020-07-11 RX ADMIN — POTASSIUM CHLORIDE: 2 INJECTION, SOLUTION, CONCENTRATE INTRAVENOUS at 22:19

## 2020-07-11 RX ADMIN — POTASSIUM CHLORIDE: 2 INJECTION, SOLUTION, CONCENTRATE INTRAVENOUS at 01:08

## 2020-07-11 RX ADMIN — DIPHENHYDRAMINE HYDROCHLORIDE 25 MG: 25 CAPSULE ORAL at 10:37

## 2020-07-11 RX ADMIN — HEPARIN SODIUM 5000 UNITS: 5000 INJECTION INTRAVENOUS; SUBCUTANEOUS at 22:12

## 2020-07-11 RX ADMIN — HYDROXYUREA 500 MG: 500 CAPSULE ORAL at 17:32

## 2020-07-11 RX ADMIN — HYDROXYUREA 500 MG: 500 CAPSULE ORAL at 22:11

## 2020-07-11 RX ADMIN — DIPHENHYDRAMINE HYDROCHLORIDE 25 MG: 25 CAPSULE ORAL at 17:32

## 2020-07-11 RX ADMIN — DIPHENHYDRAMINE HYDROCHLORIDE 25 MG: 25 CAPSULE ORAL at 22:11

## 2020-07-11 RX ADMIN — DIPHENHYDRAMINE HYDROCHLORIDE 25 MG: 25 CAPSULE ORAL at 04:05

## 2020-07-11 RX ADMIN — HYDROMORPHONE HYDROCHLORIDE 1 MG: 2 TABLET ORAL at 10:37

## 2020-07-11 RX ADMIN — HYDROXYUREA 500 MG: 500 CAPSULE ORAL at 10:38

## 2020-07-11 RX ADMIN — HEPARIN SODIUM 5000 UNITS: 5000 INJECTION INTRAVENOUS; SUBCUTANEOUS at 14:23

## 2020-07-11 RX ADMIN — ONDANSETRON 4 MG: 2 INJECTION INTRAMUSCULAR; INTRAVENOUS at 04:05

## 2020-07-11 RX ADMIN — HYDROMORPHONE HYDROCHLORIDE 1 MG: 1 INJECTION, SOLUTION INTRAMUSCULAR; INTRAVENOUS; SUBCUTANEOUS at 23:02

## 2020-07-11 RX ADMIN — Medication 10 ML: at 06:57

## 2020-07-11 RX ADMIN — ONDANSETRON 4 MG: 2 INJECTION INTRAMUSCULAR; INTRAVENOUS at 22:11

## 2020-07-11 RX ADMIN — LOSARTAN POTASSIUM 50 MG: 25 TABLET, FILM COATED ORAL at 22:50

## 2020-07-11 NOTE — PROGRESS NOTES
Bedside shift change report given to SAMIR Pope (oncoming nurse) by [facility] AT [facility] RN (offgoing nurse). Report included the following information SBAR and Kardex.     Zone Phone:   [phone]     Significant changes during shift:  Continue pain management     Patient Information     Maurice Bryant  64 y.o.  7/4/2020  6:52 AM by Ana Bryant MD. Brinda Sanders admitted from Home     Problem List               Patient Active Problem List     Diagnosis Date Noted    AVN of femur ([facility] [address] 01/25/2020       Priority: 3 - Three    Hypokalemia 01/25/2020       Priority: 3 - Three    Viral gastritis 07/06/2020    Dehydration 07/06/2020    UTI (urinary tract infection) 07/04/2020    Acute gastroenteritis 10/27/2019    Class 1 obesity due to excess calories with serious comorbidity and body mass index (BMI) of 30.0 to 30.9 in adult 08/13/2019    Sickle cell disease ([facility] [address] 02/10/2019    Overweight (BMI 25.0-29.9) 02/07/2019    Sickle cell pain crisis ([facility] [address] 09/08/2018    Carpal tunnel syndrome of right wrist 06/12/2018    Pulsatile tinnitus 10/31/2017    Sickle cell crisis ([address] 05/22/2017    Depression 10/05/2014    Hypertension 10/03/2014    Venous insufficiency 10/03/2014              Past Medical History:   Diagnosis Date    Anemia       SC hemoglobinopathy    Chronic pain      Depression      Hypertension      Liver disease       hepatitis C; pt reports \"cured\"    Sickle cell disease ([facility] [address]        Core Measures:     CVA: No No  CHF:No No  PNA:No No     Activity Status:     OOB to Chair No  Ambulated this shift Yes   Bed Rest No     DVT prophylaxis:     DVT prophylaxis Med- Yes  DVT prophylaxis SCD or SARA- No      Patient Safety:     Falls Score Total Score: 1  Safety Level_______  Bed Alarm On? No  Sitter?  No     Plan for upcoming shift: D/c home with family      Discharge Plan: Yes home     Active Consults:  None

## 2020-07-11 NOTE — PROGRESS NOTES
Problem: Falls - Risk of  Goal: *Absence of Falls  Description: Document Sergio Guerra Fall Risk and appropriate interventions in the flowsheet.   Outcome: Progressing Towards Goal  Note: Fall Risk Interventions:            Medication Interventions: Assess postural VS orthostatic hypotension                   Problem: Patient Education: Go to Patient Education Activity  Goal: Patient/Family Education  Outcome: Progressing Towards Goal

## 2020-07-11 NOTE — PROGRESS NOTES
Hospitalist Progress Note    NAME: Cherelle Rivas   :  1960   MRN:  [id number]       Assessment / Plan:  I am covering for Dr. Axel Riggs this weekend. Sickle cell crisis (HCC)  -hbg has been stable   -analgesia as ordered  -cont hydroxyurea        UTI (urinary tract infection)   -completed Abx        Dehydration   -s/p IVF  encourage PO intake    HTN  -cont cozzar as tolerated    30.0 - 39.9 Obese / Body mass index is 30.09 kg/m². Code status: Full  Prophylaxis: Hep SQ  Recommended Disposition: Home w/Family     Subjective:     Chief Complaint / Reason for Physician Visit  Discussed with RN events overnight. Patient was seen and examined. No acute events overnight. She was sitting in chair eating her breakfast.    \" I am doing good nothing new\"    Review of Systems:  Symptom Y/N Comments  Symptom Y/N Comments   Fever/Chills n   Chest Pain n    Poor Appetite    Edema     Cough n   Abdominal Pain n    Sputum    Joint Pain     SOB/MARS n   Pruritis/Rash     Nausea/vomit n   Tolerating PT/OT     Diarrhea    Tolerating Diet y    Constipation    Other       Could NOT obtain due to:          Objective:     VITALS:   Last 24hrs VS reviewed since prior progress note. Most recent are:  Patient Vitals for the past 24 hrs:   Temp Pulse Resp BP SpO2   20 0651 98.5 °F (36.9 °C) 84 18 131/70 96 %   20 0312 98.6 °F (37 °C) 80 18 107/62 97 %   20 0010 98.6 °F (37 °C) 79 18 142/81 95 %   07/10/20 1853 98.5 °F (36.9 °C) 83 16 109/64 99 %   07/10/20 1600  88      07/10/20 1439 99.3 °F (37.4 °C) 88 16 126/78 98 %   07/10/20 1200  82      07/10/20 1130 98.4 °F (36.9 °C) 82 16 (!) 125/91 98 %     No intake or output data in the 24 hours ending 20 0841     PHYSICAL EXAM:  General: WD, WN. Alert, cooperative, no acute distress    EENT:  EOMI. Anicteric sclerae. MMM  Resp:  CTA bilaterally, no wheezing or rales.   No accessory muscle use  CV:  Regular  rhythm,  No edema  GI:  Soft, Non distended, Non tender.  +Bowel sounds  Neurologic:  Alert and oriented X 3, normal speech,   Psych:   Good insight. Not anxious nor agitated  Skin:  No rashes. No jaundice    Reviewed most current lab test results and cultures  YES  Reviewed most current radiology test results   YES  Review and summation of old records today    NO  Reviewed patient's current orders and MAR    YES  PMH/SH reviewed - no change compared to H&P  ________________________________________________________________________  Care Plan discussed with:    Comments   Patient x    Family      RN x    Care Manager     Consultant                        Multidiciplinary team rounds were held today with , nursing, pharmacist and clinical coordinator. Patient's plan of care was discussed; medications were reviewed and discharge planning was addressed. ________________________________________________________________________        Comments   >50% of visit spent in counseling and coordination of care     ________________________________________________________________________  Jerry Reese MD     Procedures: see electronic medical records for all procedures/Xrays and details which were not copied into this note but were reviewed prior to creation of Plan. LABS:  I reviewed today's most current labs and imaging studies.   Pertinent labs include:  Recent Labs     07/09/20 0426   WBC 7.2   HGB 8.9*   HCT 24.7*        Recent Labs     07/09/20  0426      K 4.2      CO2 27   GLU 85   BUN 6   CREA 0.76   CA 8.6   ALB 3.3*   TBILI 0.7   ALT 20       Signed: Jerry Reese MD

## 2020-07-12 LAB
ALBUMIN SERPL-MCNC: 3.3 G/DL (ref 3.5–5)
ALBUMIN/GLOB SERPL: 0.7 {RATIO} (ref 1.1–2.2)
ALP SERPL-CCNC: 91 U/L (ref 45–117)
ALT SERPL-CCNC: 17 U/L (ref 12–78)
ANION GAP SERPL CALC-SCNC: 7 MMOL/L (ref 5–15)
AST SERPL-CCNC: 32 U/L (ref 15–37)
BASOPHILS # BLD: 0 K/UL (ref 0–0.1)
BASOPHILS NFR BLD: 0 % (ref 0–1)
BILIRUB SERPL-MCNC: 0.7 MG/DL (ref 0.2–1)
BLASTS NFR BLD MANUAL: 0 %
BUN SERPL-MCNC: 10 MG/DL (ref 6–20)
BUN/CREAT SERPL: 11 (ref 12–20)
CALCIUM SERPL-MCNC: 8.3 MG/DL (ref 8.5–10.1)
CHLORIDE SERPL-SCNC: 106 MMOL/L (ref 97–108)
CO2 SERPL-SCNC: 26 MMOL/L (ref 21–32)
CREAT SERPL-MCNC: 0.91 MG/DL (ref 0.55–1.02)
DIFFERENTIAL METHOD BLD: ABNORMAL
EOSINOPHIL # BLD: 0.1 K/UL (ref 0–0.4)
EOSINOPHIL NFR BLD: 1 % (ref 0–7)
ERYTHROCYTE [DISTWIDTH] IN BLOOD BY AUTOMATED COUNT: 13.7 % (ref 11.5–14.5)
GLOBULIN SER CALC-MCNC: 4.9 G/DL (ref 2–4)
GLUCOSE SERPL-MCNC: 96 MG/DL (ref 65–100)
HCT VFR BLD AUTO: 25.3 % (ref 35–47)
HGB BLD-MCNC: 8.9 G/DL (ref 11.5–16)
IMM GRANULOCYTES # BLD AUTO: 0 K/UL
IMM GRANULOCYTES NFR BLD AUTO: 0 %
LYMPHOCYTES # BLD: 4.1 K/UL (ref 0.8–3.5)
LYMPHOCYTES NFR BLD: 74 % (ref 12–49)
MCH RBC QN AUTO: 41.2 PG (ref 26–34)
MCHC RBC AUTO-ENTMCNC: 35.2 G/DL (ref 30–36.5)
MCV RBC AUTO: 117.1 FL (ref 80–99)
METAMYELOCYTES NFR BLD MANUAL: 0 %
MONOCYTES # BLD: 0.7 K/UL (ref 0–1)
MONOCYTES NFR BLD: 12 % (ref 5–13)
MYELOCYTES NFR BLD MANUAL: 0 %
NEUTS BAND NFR BLD MANUAL: 0 % (ref 0–6)
NEUTS SEG # BLD: 0.7 K/UL (ref 1.8–8)
NEUTS SEG NFR BLD: 13 % (ref 32–75)
NRBC # BLD: 0.12 K/UL (ref 0–0.01)
NRBC BLD-RTO: 2.1 PER 100 WBC
OTHER CELLS NFR BLD MANUAL: 0 %
PLATELET # BLD AUTO: 173 K/UL (ref 150–400)
POTASSIUM SERPL-SCNC: 4.3 MMOL/L (ref 3.5–5.1)
PROMYELOCYTES NFR BLD MANUAL: 0 %
PROT SERPL-MCNC: 8.2 G/DL (ref 6.4–8.2)
RBC # BLD AUTO: 2.16 M/UL (ref 3.8–5.2)
RBC MORPH BLD: ABNORMAL
RBC MORPH BLD: ABNORMAL
SODIUM SERPL-SCNC: 139 MMOL/L (ref 136–145)
WBC # BLD AUTO: 5.6 K/UL (ref 3.6–11)

## 2020-07-12 PROCEDURE — 65660000000 HC RM CCU STEPDOWN

## 2020-07-12 PROCEDURE — 74011250636 HC RX REV CODE- 250/636: Performed by: INTERNAL MEDICINE

## 2020-07-12 PROCEDURE — 80053 COMPREHEN METABOLIC PANEL: CPT

## 2020-07-12 PROCEDURE — 36415 COLL VENOUS BLD VENIPUNCTURE: CPT

## 2020-07-12 PROCEDURE — 74011250637 HC RX REV CODE- 250/637: Performed by: INTERNAL MEDICINE

## 2020-07-12 PROCEDURE — 94760 N-INVAS EAR/PLS OXIMETRY 1: CPT

## 2020-07-12 PROCEDURE — 85027 COMPLETE CBC AUTOMATED: CPT

## 2020-07-12 PROCEDURE — 74011000258 HC RX REV CODE- 258: Performed by: INTERNAL MEDICINE

## 2020-07-12 RX ADMIN — DIPHENHYDRAMINE HYDROCHLORIDE 25 MG: 25 CAPSULE ORAL at 21:33

## 2020-07-12 RX ADMIN — DIPHENHYDRAMINE HYDROCHLORIDE 25 MG: 25 CAPSULE ORAL at 06:31

## 2020-07-12 RX ADMIN — ONDANSETRON 4 MG: 2 INJECTION INTRAMUSCULAR; INTRAVENOUS at 21:33

## 2020-07-12 RX ADMIN — HEPARIN SODIUM 5000 UNITS: 5000 INJECTION INTRAVENOUS; SUBCUTANEOUS at 14:00

## 2020-07-12 RX ADMIN — HEPARIN SODIUM 5000 UNITS: 5000 INJECTION INTRAVENOUS; SUBCUTANEOUS at 23:16

## 2020-07-12 RX ADMIN — ONDANSETRON 4 MG: 2 INJECTION INTRAMUSCULAR; INTRAVENOUS at 06:31

## 2020-07-12 RX ADMIN — Medication 1 CAPSULE: at 09:02

## 2020-07-12 RX ADMIN — HYDROMORPHONE HYDROCHLORIDE 1 MG: 1 INJECTION, SOLUTION INTRAMUSCULAR; INTRAVENOUS; SUBCUTANEOUS at 02:13

## 2020-07-12 RX ADMIN — HYDROXYUREA 500 MG: 500 CAPSULE ORAL at 23:16

## 2020-07-12 RX ADMIN — HEPARIN SODIUM 5000 UNITS: 5000 INJECTION INTRAVENOUS; SUBCUTANEOUS at 06:31

## 2020-07-12 RX ADMIN — PANTOPRAZOLE SODIUM 40 MG: 40 TABLET, DELAYED RELEASE ORAL at 23:16

## 2020-07-12 RX ADMIN — HYDROXYUREA 500 MG: 500 CAPSULE ORAL at 09:07

## 2020-07-12 RX ADMIN — POTASSIUM CHLORIDE: 2 INJECTION, SOLUTION, CONCENTRATE INTRAVENOUS at 09:01

## 2020-07-12 RX ADMIN — HYDROMORPHONE HYDROCHLORIDE 1 MG: 1 INJECTION, SOLUTION INTRAMUSCULAR; INTRAVENOUS; SUBCUTANEOUS at 06:31

## 2020-07-12 RX ADMIN — DIPHENHYDRAMINE HYDROCHLORIDE 25 MG: 25 CAPSULE ORAL at 14:01

## 2020-07-12 RX ADMIN — HYDROMORPHONE HYDROCHLORIDE 1 MG: 1 INJECTION, SOLUTION INTRAMUSCULAR; INTRAVENOUS; SUBCUTANEOUS at 13:00

## 2020-07-12 RX ADMIN — ONDANSETRON 4 MG: 2 INJECTION INTRAMUSCULAR; INTRAVENOUS at 14:00

## 2020-07-12 RX ADMIN — HYDROMORPHONE HYDROCHLORIDE 1 MG: 1 INJECTION, SOLUTION INTRAMUSCULAR; INTRAVENOUS; SUBCUTANEOUS at 20:23

## 2020-07-12 RX ADMIN — HYDROMORPHONE HYDROCHLORIDE 1 MG: 1 INJECTION, SOLUTION INTRAMUSCULAR; INTRAVENOUS; SUBCUTANEOUS at 16:26

## 2020-07-12 RX ADMIN — Medication 10 ML: at 02:14

## 2020-07-12 RX ADMIN — POTASSIUM CHLORIDE: 2 INJECTION, SOLUTION, CONCENTRATE INTRAVENOUS at 20:23

## 2020-07-12 RX ADMIN — HYDROMORPHONE HYDROCHLORIDE 1 MG: 1 INJECTION, SOLUTION INTRAMUSCULAR; INTRAVENOUS; SUBCUTANEOUS at 09:40

## 2020-07-12 RX ADMIN — CITALOPRAM HYDROBROMIDE 10 MG: 20 TABLET ORAL at 23:16

## 2020-07-12 RX ADMIN — Medication 10 ML: at 23:18

## 2020-07-12 NOTE — PROGRESS NOTES
Problem: Falls - Risk of  Goal: *Absence of Falls  Description: Document Catherine Mcqueen Fall Risk and appropriate interventions in the flowsheet.   Outcome: Progressing Towards Goal  Note: Fall Risk Interventions:            Medication Interventions: Bed/chair exit alarm, Patient to call before getting OOB, Evaluate medications/consider consulting pharmacy, Teach patient to arise slowly                   Problem: Pain  Goal: *Control of Pain  Outcome: Progressing Towards Goal  Goal: *PALLIATIVE CARE:  Alleviation of Pain  Outcome: Progressing Towards Goal     Problem: Patient Education: Go to Patient Education Activity  Goal: Patient/Family Education  Outcome: Progressing Towards Goal

## 2020-07-12 NOTE — PROGRESS NOTES
Hospitalist Progress Note    NAME: Shahla Linn   :  1960   MRN:  [id number]       Assessment / Plan:  I am covering for Dr. Orvil Cogan this weekend. Sickle cell crisis (HCC)  -hbg has been stable 8.9 today  -analgesia as ordered  -cont hydroxyurea        UTI (urinary tract infection)   -completed Abx        Dehydration   -s/p IVF  -encourage PO intake    HTN  -cont cozzar as tolerated    30.0 - 39.9 Obese / Body mass index is 29.77 kg/m². Code status: Full  Prophylaxis: Hep SQ  Recommended Disposition: Home w/Family     Subjective:     Chief Complaint / Reason for Physician Visit  Discussed with RN events overnight. Patient was seen and examined. No acute events overnight. She was sitting in chair watching TV. \"doing ok just my usual pain\"    Review of Systems:  Symptom Y/N Comments  Symptom Y/N Comments   Fever/Chills n   Chest Pain n    Poor Appetite    Edema     Cough n   Abdominal Pain n    Sputum    Joint Pain     SOB/MARS n   Pruritis/Rash     Nausea/vomit n   Tolerating PT/OT     Diarrhea    Tolerating Diet y    Constipation    Other       Could NOT obtain due to:          Objective:     VITALS:   Last 24hrs VS reviewed since prior progress note. Most recent are:  Patient Vitals for the past 24 hrs:   Temp Pulse Resp BP SpO2   20 0630 98.4 °F (36.9 °C) 88 18 124/71 99 %   20 0432 98.2 °F (36.8 °C) 79 18 97/54 100 %   20 2249 98.9 °F (37.2 °C) 91 18 114/69 97 %   20 1954    119/64    20 1846 98.6 °F (37 °C) 88 18 95/58 96 %   20 1525 98.9 °F (37.2 °C) 82 18 109/63 97 %   20 1210 98.2 °F (36.8 °C) 84 16 129/86 91 %     No intake or output data in the 24 hours ending 20 0855     PHYSICAL EXAM:  General: WD, WN. Alert, cooperative, no acute distress    EENT:  EOMI. Anicteric sclerae. MMM  Resp:  CTA bilaterally, no wheezing or rales. No accessory muscle use  CV:  Regular  rhythm,  No edema  GI:  Soft, Non distended, Non tender.  +Bowel sounds  Neurologic:  Alert and oriented X 3, normal speech,   Psych:   Good insight. Not anxious nor agitated  Skin:  No rashes. No jaundice    Reviewed most current lab test results and cultures  YES  Reviewed most current radiology test results   YES  Review and summation of old records today    NO  Reviewed patient's current orders and MAR    YES  PMH/SH reviewed - no change compared to H&P  ________________________________________________________________________  Care Plan discussed with:    Comments   Patient x    Family      RN x    Care Manager     Consultant                        Multidiciplinary team rounds were held today with , nursing, pharmacist and clinical coordinator. Patient's plan of care was discussed; medications were reviewed and discharge planning was addressed. ________________________________________________________________________        Comments   >50% of visit spent in counseling and coordination of care     ________________________________________________________________________  Dagoberto Rios MD     Procedures: see electronic medical records for all procedures/Xrays and details which were not copied into this note but were reviewed prior to creation of Plan. LABS:  I reviewed today's most current labs and imaging studies. Pertinent labs include:  Recent Labs     07/12/20  0304   WBC 5.6   HGB 8.9*   HCT 25.3*        No results for input(s): NA, K, CL, CO2, GLU, BUN, CREA, CA, MG, PHOS, ALB, TBIL, TBILI, ALT, INR, INREXT, INREXT in the last 72 hours.     No lab exists for component: SGOT    Signed: Dagoberto Rios MD

## 2020-07-13 LAB
ALBUMIN SERPL-MCNC: 3.1 G/DL (ref 3.5–5)
ALBUMIN/GLOB SERPL: 0.6 {RATIO} (ref 1.1–2.2)
ALP SERPL-CCNC: 87 U/L (ref 45–117)
ALT SERPL-CCNC: 18 U/L (ref 12–78)
ANION GAP SERPL CALC-SCNC: 4 MMOL/L (ref 5–15)
AST SERPL-CCNC: 28 U/L (ref 15–37)
BASOPHILS # BLD: 0 K/UL (ref 0–0.1)
BASOPHILS NFR BLD: 0 % (ref 0–1)
BILIRUB SERPL-MCNC: 1 MG/DL (ref 0.2–1)
BLASTS NFR BLD MANUAL: 0 %
BUN SERPL-MCNC: 10 MG/DL (ref 6–20)
BUN/CREAT SERPL: 12 (ref 12–20)
CALCIUM SERPL-MCNC: 8.6 MG/DL (ref 8.5–10.1)
CHLORIDE SERPL-SCNC: 104 MMOL/L (ref 97–108)
CO2 SERPL-SCNC: 27 MMOL/L (ref 21–32)
CREAT SERPL-MCNC: 0.81 MG/DL (ref 0.55–1.02)
DIFFERENTIAL METHOD BLD: ABNORMAL
EOSINOPHIL # BLD: 0.1 K/UL (ref 0–0.4)
EOSINOPHIL NFR BLD: 2 % (ref 0–7)
ERYTHROCYTE [DISTWIDTH] IN BLOOD BY AUTOMATED COUNT: 13.3 % (ref 11.5–14.5)
GLOBULIN SER CALC-MCNC: 5.1 G/DL (ref 2–4)
GLUCOSE SERPL-MCNC: 89 MG/DL (ref 65–100)
HCT VFR BLD AUTO: 24.4 % (ref 35–47)
HGB BLD-MCNC: 8.6 G/DL (ref 11.5–16)
IMM GRANULOCYTES # BLD AUTO: 0 K/UL
IMM GRANULOCYTES NFR BLD AUTO: 0 %
LYMPHOCYTES # BLD: 3.5 K/UL (ref 0.8–3.5)
LYMPHOCYTES NFR BLD: 60 % (ref 12–49)
MCH RBC QN AUTO: 40.4 PG (ref 26–34)
MCHC RBC AUTO-ENTMCNC: 35.2 G/DL (ref 30–36.5)
MCV RBC AUTO: 114.6 FL (ref 80–99)
METAMYELOCYTES NFR BLD MANUAL: 0 %
MONOCYTES # BLD: 0.3 K/UL (ref 0–1)
MONOCYTES NFR BLD: 6 % (ref 5–13)
MYELOCYTES NFR BLD MANUAL: 0 %
NEUTS BAND NFR BLD MANUAL: 0 % (ref 0–6)
NEUTS SEG # BLD: 1.8 K/UL (ref 1.8–8)
NEUTS SEG NFR BLD: 32 % (ref 32–75)
NRBC # BLD: 0.05 K/UL (ref 0–0.01)
NRBC BLD-RTO: 0.9 PER 100 WBC
OTHER CELLS NFR BLD MANUAL: 0 %
PLATELET # BLD AUTO: 233 K/UL (ref 150–400)
PMV BLD AUTO: 11.4 FL (ref 8.9–12.9)
POTASSIUM SERPL-SCNC: 4.5 MMOL/L (ref 3.5–5.1)
PROMYELOCYTES NFR BLD MANUAL: 0 %
PROT SERPL-MCNC: 8.2 G/DL (ref 6.4–8.2)
RBC # BLD AUTO: 2.13 M/UL (ref 3.8–5.2)
RBC MORPH BLD: ABNORMAL
SODIUM SERPL-SCNC: 135 MMOL/L (ref 136–145)
WBC # BLD AUTO: 5.7 K/UL (ref 3.6–11)

## 2020-07-13 PROCEDURE — 94760 N-INVAS EAR/PLS OXIMETRY 1: CPT

## 2020-07-13 PROCEDURE — 74011250636 HC RX REV CODE- 250/636: Performed by: INTERNAL MEDICINE

## 2020-07-13 PROCEDURE — 74011250637 HC RX REV CODE- 250/637: Performed by: HOSPITALIST

## 2020-07-13 PROCEDURE — 36415 COLL VENOUS BLD VENIPUNCTURE: CPT

## 2020-07-13 PROCEDURE — 74011250637 HC RX REV CODE- 250/637: Performed by: INTERNAL MEDICINE

## 2020-07-13 PROCEDURE — 74011000258 HC RX REV CODE- 258: Performed by: INTERNAL MEDICINE

## 2020-07-13 PROCEDURE — 80053 COMPREHEN METABOLIC PANEL: CPT

## 2020-07-13 PROCEDURE — 65660000000 HC RM CCU STEPDOWN

## 2020-07-13 PROCEDURE — 85027 COMPLETE CBC AUTOMATED: CPT

## 2020-07-13 RX ORDER — HYDROCODONE BITARTRATE AND ACETAMINOPHEN 10; 325 MG/1; MG/1
1 TABLET ORAL
Status: DISCONTINUED | OUTPATIENT
Start: 2020-07-13 | End: 2020-07-15 | Stop reason: HOSPADM

## 2020-07-13 RX ORDER — HYDROMORPHONE HYDROCHLORIDE 1 MG/ML
1 INJECTION, SOLUTION INTRAMUSCULAR; INTRAVENOUS; SUBCUTANEOUS
Status: DISCONTINUED | OUTPATIENT
Start: 2020-07-13 | End: 2020-07-14

## 2020-07-13 RX ORDER — DEXTROSE, SODIUM CHLORIDE, AND POTASSIUM CHLORIDE 5; .45; .22 G/100ML; G/100ML; G/100ML
INJECTION INTRAVENOUS CONTINUOUS
Status: DISCONTINUED | OUTPATIENT
Start: 2020-07-13 | End: 2020-07-14

## 2020-07-13 RX ADMIN — PANTOPRAZOLE SODIUM 40 MG: 40 TABLET, DELAYED RELEASE ORAL at 21:20

## 2020-07-13 RX ADMIN — Medication 10 ML: at 00:01

## 2020-07-13 RX ADMIN — Medication 10 ML: at 21:21

## 2020-07-13 RX ADMIN — HYDROCODONE BITARTRATE AND ACETAMINOPHEN 1 TABLET: 10; 325 TABLET ORAL at 17:53

## 2020-07-13 RX ADMIN — DOCUSATE SODIUM 100 MG: 100 CAPSULE, LIQUID FILLED ORAL at 10:56

## 2020-07-13 RX ADMIN — HYDROCODONE BITARTRATE AND ACETAMINOPHEN 1 TABLET: 10; 325 TABLET ORAL at 09:38

## 2020-07-13 RX ADMIN — ONDANSETRON 4 MG: 2 INJECTION INTRAMUSCULAR; INTRAVENOUS at 10:57

## 2020-07-13 RX ADMIN — HYDROMORPHONE HYDROCHLORIDE 1 MG: 1 INJECTION, SOLUTION INTRAMUSCULAR; INTRAVENOUS; SUBCUTANEOUS at 00:01

## 2020-07-13 RX ADMIN — HYDROMORPHONE HYDROCHLORIDE 1 MG: 1 INJECTION, SOLUTION INTRAMUSCULAR; INTRAVENOUS; SUBCUTANEOUS at 13:26

## 2020-07-13 RX ADMIN — ONDANSETRON 4 MG: 2 INJECTION INTRAMUSCULAR; INTRAVENOUS at 23:19

## 2020-07-13 RX ADMIN — HEPARIN SODIUM 5000 UNITS: 5000 INJECTION INTRAVENOUS; SUBCUTANEOUS at 14:44

## 2020-07-13 RX ADMIN — HYDROMORPHONE HYDROCHLORIDE 1 MG: 1 INJECTION, SOLUTION INTRAMUSCULAR; INTRAVENOUS; SUBCUTANEOUS at 04:26

## 2020-07-13 RX ADMIN — ONDANSETRON 4 MG: 2 INJECTION INTRAMUSCULAR; INTRAVENOUS at 17:53

## 2020-07-13 RX ADMIN — HYDROMORPHONE HYDROCHLORIDE 1 MG: 1 INJECTION, SOLUTION INTRAMUSCULAR; INTRAVENOUS; SUBCUTANEOUS at 21:21

## 2020-07-13 RX ADMIN — HEPARIN SODIUM 5000 UNITS: 5000 INJECTION INTRAVENOUS; SUBCUTANEOUS at 21:21

## 2020-07-13 RX ADMIN — DIPHENHYDRAMINE HYDROCHLORIDE 25 MG: 25 CAPSULE ORAL at 23:19

## 2020-07-13 RX ADMIN — HYDROXYUREA 500 MG: 500 CAPSULE ORAL at 12:07

## 2020-07-13 RX ADMIN — POTASSIUM CHLORIDE: 2 INJECTION, SOLUTION, CONCENTRATE INTRAVENOUS at 06:09

## 2020-07-13 RX ADMIN — LOSARTAN POTASSIUM 50 MG: 25 TABLET, FILM COATED ORAL at 21:20

## 2020-07-13 RX ADMIN — DIPHENHYDRAMINE HYDROCHLORIDE 25 MG: 25 CAPSULE ORAL at 10:56

## 2020-07-13 RX ADMIN — DIPHENHYDRAMINE HYDROCHLORIDE 25 MG: 25 CAPSULE ORAL at 17:53

## 2020-07-13 RX ADMIN — Medication 10 ML: at 06:09

## 2020-07-13 RX ADMIN — HEPARIN SODIUM 5000 UNITS: 5000 INJECTION INTRAVENOUS; SUBCUTANEOUS at 06:09

## 2020-07-13 RX ADMIN — CITALOPRAM HYDROBROMIDE 10 MG: 20 TABLET ORAL at 21:20

## 2020-07-13 RX ADMIN — HYDROXYUREA 500 MG: 500 CAPSULE ORAL at 21:22

## 2020-07-13 RX ADMIN — Medication 10 ML: at 13:27

## 2020-07-13 RX ADMIN — Medication 1 CAPSULE: at 09:35

## 2020-07-13 NOTE — PROGRESS NOTES
KRISTINA Plan:    * home with f/u apts    > Pt will need out-pt f/u apts secured prior to d/c  > Pt's family to provide transportation at d/c  > 2nd IM prior to d/c    Initial note: CM reviewed pt's chart and noted updates prior to moving forward with d/c planning. Per attending MD's note from today, pt is projected to d/c Wednesday (7/15/2020) if she continues to progress medically. CM will continue to follow pt for consults and any needs prior to discharge. If any concerns or questions arise pertaining to pt discharge, please contact unit CM.     Julane Severs, MSW  Care Manager, [address]

## 2020-07-13 NOTE — PROGRESS NOTES
* No surgery found *  * No surgery found *  Bedside shift change report given to Dionicio Acuna (oncoming nurse) by Tari Munoz RN (offgoing nurse). Report included the following information SBAR and Kardex. Zone Phone:   [phone]    Significant changes during shift:  Continued pain management- frequency decreased    Patient Information    Sarah Lew  64 y.o.  7/4/2020  6:52 AM by Rodger Darnell MD. Sarah Lew was admitted from Home    Problem List    Patient Active Problem List    Diagnosis Date Noted    AVN of femur ([facility] [address] 01/25/2020     Priority: 3 - Three    Hypokalemia 01/25/2020     Priority: 3 - Three    Viral gastritis 07/06/2020    Dehydration 07/06/2020    UTI (urinary tract infection) 07/04/2020    Acute gastroenteritis 10/27/2019    Class 1 obesity due to excess calories with serious comorbidity and body mass index (BMI) of 30.0 to 30.9 in adult 08/13/2019    Sickle cell disease ([facility] [address] 02/10/2019    Overweight (BMI 25.0-29.9) 02/07/2019    Sickle cell pain crisis ([facility] [address] 09/08/2018    Carpal tunnel syndrome of right wrist 06/12/2018    Pulsatile tinnitus 10/31/2017    Sickle cell crisis ([facility] [address] 05/22/2017    Depression 10/05/2014    Hypertension 10/03/2014    Venous insufficiency 10/03/2014     Past Medical History:   Diagnosis Date    Anemia     SC hemoglobinopathy    Chronic pain     Depression     Hypertension     Liver disease     hepatitis C; pt reports \"cured\"    Sickle cell disease ([facility] [address]      Core Measures:    CVA: No No  CHF:No No  PNA:No No    Activity Status:    OOB to Chair No  Ambulated this shift Yes   Bed Rest No    DVT prophylaxis:    DVT prophylaxis Med- Yes  DVT prophylaxis SCD or SARA- No     Patient Safety:    Falls Score Total Score: 1  Safety Level_______  Bed Alarm On? No  Sitter?  No    Plan for upcoming shift: Pain management  Discharge Plan: Yes home    Active Consults:  None

## 2020-07-13 NOTE — PROGRESS NOTES
General Daily Progress Note    Admit Date: 7/4/2020    Subjective:     Patient continues to complain of pain but improving. .     Current Facility-Administered Medications   Medication Dose Route Frequency    HYDROmorphone (PF) (DILAUDID) injection 1 mg  1 mg IntraVENous Q3H PRN    lactobac ac& pc-s.therm-b.anim (CARLITOS Q/RISAQUAD)  1 Cap Oral DAILY    dextrose 5 % - 0.45% NaCl 1,000 mL with potassium chloride 30 mEq infusion   IntraVENous CONTINUOUS    fentaNYL (DURAGESIC) 75 mcg/hr patch 1 Patch  1 Patch TransDERmal Q72H    sodium chloride (NS) flush 5-10 mL  5-10 mL IntraVENous PRN    citalopram (CELEXA) tablet 10 mg  10 mg Oral QHS    hydroxyurea (HYDREA) chemo cap 500 mg  500 mg Oral BID    pantoprazole (PROTONIX) tablet 40 mg  40 mg Oral QHS    losartan (COZAAR) tablet 50 mg  50 mg Oral QHS    sodium chloride (NS) flush 5-40 mL  5-40 mL IntraVENous Q8H    sodium chloride (NS) flush 5-40 mL  5-40 mL IntraVENous PRN    acetaminophen (TYLENOL) tablet 650 mg  650 mg Oral Q6H PRN    docusate sodium (COLACE) capsule 100 mg  100 mg Oral DAILY PRN    heparin (porcine) injection 5,000 Units  5,000 Units SubCUTAneous Q8H    diphenhydrAMINE (BENADRYL) capsule 25 mg  25 mg Oral Q4H PRN    ondansetron (ZOFRAN) injection 4 mg  4 mg IntraVENous Q4H PRN        Review of Systems  A comprehensive review of systems was negative. Objective:     Patient Vitals for the past 24 hrs:   BP Temp Pulse Resp SpO2   07/13/20 0636 115/71 98.7 °F (37.1 °C) 76 16 95 %   07/13/20 0303 119/74 98.5 °F (36.9 °C) 80 16 96 %   07/12/20 2315 100/64 98.8 °F (37.1 °C) 82 18 96 %   07/12/20 1849 119/74 99.4 °F (37.4 °C) 87 18 97 %   07/12/20 1544 121/70 98.9 °F (37.2 °C) 83 18 96 %   07/12/20 1228 123/70 98.6 °F (37 °C) 83 16 99 %     No intake/output data recorded.   07/11 1901 - 07/13 0700  In: 17270 [I.V.:92399]  Out: -     Physical Exam:   Visit Vitals  /71   Pulse 76   Temp 98.7 °F (37.1 °C)   Resp 16   Ht 5' 10\" (1.778 m) Wt 207 lb 8 oz (94.1 kg)   SpO2 95%   Breastfeeding No   BMI 29.77 kg/m²     General appearance: alert, cooperative, no distress, appears stated age  Neck: supple, symmetrical, trachea midline, no adenopathy, thyroid: not enlarged, symmetric, no tenderness/mass/nodules, no carotid bruit and no JVD  Lungs: clear to auscultation bilaterally  Heart: regular rate and rhythm, S1, S2 normal, no murmur, click, rub or gallop  Abdomen: soft, non-tender. Bowel sounds normal. No masses,  no organomegaly  Extremities: extremities normal, atraumatic, no cyanosis or edema    Assessment:     Active Problems:    Sickle cell crisis ([facility] [address] (5/22/2017)      UTI (urinary tract infection) (7/4/2020)      Viral gastritis (7/6/2020)      Dehydration (7/6/2020)        Plan:     1. Crisis is gradually improving. Will reduce analgesics and anticipate discharge on Wednesday if all goes well.

## 2020-07-13 NOTE — PROGRESS NOTES
* No surgery found *  * No surgery found *  Bedside shift change report given to shanta(oncoming nurse) by Bisi Escobedo RN (offgoing nurse). Report included the following information SBAR and Kardex. Zone Phone:   [phone]    Significant changes during shift:  IV pain med decreased and started on Lortab with relief. MEd with colace    Patient Information    Shadi Landry  64 y.o.  7/4/2020  6:52 AM by Jaqueline Sousa MD. Shadi Landry was admitted from Home    Problem List    Patient Active Problem List    Diagnosis Date Noted    AVN of femur ([facility] [address] 01/25/2020     Priority: 3 - Three    Hypokalemia 01/25/2020     Priority: 3 - Three    Viral gastritis 07/06/2020    Dehydration 07/06/2020    UTI (urinary tract infection) 07/04/2020    Acute gastroenteritis 10/27/2019    Class 1 obesity due to excess calories with serious comorbidity and body mass index (BMI) of 30.0 to 30.9 in adult 08/13/2019    Sickle cell disease ([facility] [address] 02/10/2019    Overweight (BMI 25.0-29.9) 02/07/2019    Sickle cell pain crisis ([facility] [address] 09/08/2018    Carpal tunnel syndrome of right wrist 06/12/2018    Pulsatile tinnitus 10/31/2017    Sickle cell crisis ([facility] [address] 05/22/2017    Depression 10/05/2014    Hypertension 10/03/2014    Venous insufficiency 10/03/2014     Past Medical History:   Diagnosis Date    Anemia     SC hemoglobinopathy    Chronic pain     Depression     Hypertension     Liver disease     hepatitis C; pt reports \"cured\"    Sickle cell disease ([facility] [address]      Core Measures:    CVA: No No  CHF:No No  PNA:No No    Activity Status:    OOB to Chair No  Ambulated this shift Yes   Bed Rest No    DVT prophylaxis:    DVT prophylaxis Med- Yes  DVT prophylaxis SCD or SARA- No     Patient Safety:    Falls Score Total Score: 1  Safety Level_______  Bed Alarm On? No  Sitter?  No    Plan for upcoming shift: Pain management observe for BM  Discharge Plan: Yes home    Active Consults:  None

## 2020-07-13 NOTE — PROGRESS NOTES
Problem: Falls - Risk of  Goal: *Absence of Falls  Description: Document Ivangela Zavala Fall Risk and appropriate interventions in the flowsheet.   Outcome: Progressing Towards Goal  Note: Fall Risk Interventions:            Medication Interventions: Assess postural VS orthostatic hypotension, Bed/chair exit alarm, Evaluate medications/consider consulting pharmacy, Patient to call before getting OOB, Teach patient to arise slowly         History of Falls Interventions: Bed/chair exit alarm, Consult care management for discharge planning, Door open when patient unattended         Problem: Patient Education: Go to Patient Education Activity  Goal: Patient/Family Education  Outcome: Progressing Towards Goal     Problem: Pain  Goal: *Control of Pain  Outcome: Progressing Towards Goal  Goal: *PALLIATIVE CARE:  Alleviation of Pain  Outcome: Progressing Towards Goal     Problem: Patient Education: Go to Patient Education Activity  Goal: Patient/Family Education  Outcome: Progressing Towards Goal

## 2020-07-13 NOTE — PROGRESS NOTES
* No surgery found * 
* No surgery found * Bedside shift change report given to Jatin (oncoming nurse) by [facility] (offgoing nurse). Report included the following information SBAR and Kardex. Zone Phone:   [phone] Significant changes during shift:  Continue with pain management Patient Information General Rogers 64 y.o. 
7/4/2020  6:52 AM by Aleksandra Conrad MD. General Rogers was admitted from Home 
 
Problem List 
 
Patient Active Problem List  
 Diagnosis Date Noted  AVN of femur ([facility] [address] 01/25/2020 Priority: 3 - Three  Hypokalemia 01/25/2020 Priority: 3 - Three  Viral gastritis 07/06/2020  Dehydration 07/06/2020  UTI (urinary tract infection) 07/04/2020  Acute gastroenteritis 10/27/2019  Class 1 obesity due to excess calories with serious comorbidity and body mass index (BMI) of 30.0 to 30.9 in adult 08/13/2019  Sickle cell disease ([facility] [address] 02/10/2019  Overweight (BMI 25.0-29.9) 02/07/2019  Sickle cell pain crisis ([facility] [address] 09/08/2018  Carpal tunnel syndrome of right wrist 06/12/2018  Pulsatile tinnitus 10/31/2017  Sickle cell crisis ([facility] [address] 05/22/2017  Depression 10/05/2014  Hypertension 10/03/2014  Venous insufficiency 10/03/2014 Past Medical History:  
Diagnosis Date  Anemia SC hemoglobinopathy  Chronic pain  Depression  Hypertension  Liver disease   
 hepatitis C; pt reports \"cured\"  Sickle cell disease ([facility] [address] Core Measures: CVA: No No 
CHF:No No 
PNA:No No 
 
Activity Status: OOB to Chair No 
Ambulated this shift Yes Bed Rest No 
 
DVT prophylaxis: DVT prophylaxis Med- Yes DVT prophylaxis SCD or SARA- No  
 
Patient Safety: 
 
Falls Score Total Score: 1 Safety Level_______ Bed Alarm On? No 
Sitter? No 
 
Plan for upcoming shift: Pain management Discharge Plan: Yes home Active Consults: 
None

## 2020-07-14 PROCEDURE — 74011250637 HC RX REV CODE- 250/637: Performed by: INTERNAL MEDICINE

## 2020-07-14 PROCEDURE — 74011250636 HC RX REV CODE- 250/636: Performed by: INTERNAL MEDICINE

## 2020-07-14 PROCEDURE — 74011250637 HC RX REV CODE- 250/637: Performed by: HOSPITALIST

## 2020-07-14 PROCEDURE — 94760 N-INVAS EAR/PLS OXIMETRY 1: CPT

## 2020-07-14 PROCEDURE — 65660000000 HC RM CCU STEPDOWN

## 2020-07-14 RX ORDER — DEXTROSE, SODIUM CHLORIDE, AND POTASSIUM CHLORIDE 5; .45; .22 G/100ML; G/100ML; G/100ML
INJECTION INTRAVENOUS CONTINUOUS
Status: DISPENSED | OUTPATIENT
Start: 2020-07-14 | End: 2020-07-14

## 2020-07-14 RX ADMIN — HYDROXYUREA 500 MG: 500 CAPSULE ORAL at 22:06

## 2020-07-14 RX ADMIN — HEPARIN SODIUM 5000 UNITS: 5000 INJECTION INTRAVENOUS; SUBCUTANEOUS at 15:09

## 2020-07-14 RX ADMIN — DIPHENHYDRAMINE HYDROCHLORIDE 25 MG: 25 CAPSULE ORAL at 10:56

## 2020-07-14 RX ADMIN — LOSARTAN POTASSIUM 50 MG: 25 TABLET, FILM COATED ORAL at 22:05

## 2020-07-14 RX ADMIN — Medication 1 CAPSULE: at 09:29

## 2020-07-14 RX ADMIN — DIPHENHYDRAMINE HYDROCHLORIDE 25 MG: 25 CAPSULE ORAL at 19:38

## 2020-07-14 RX ADMIN — HYDROCODONE BITARTRATE AND ACETAMINOPHEN 1 TABLET: 10; 325 TABLET ORAL at 18:10

## 2020-07-14 RX ADMIN — HEPARIN SODIUM 5000 UNITS: 5000 INJECTION INTRAVENOUS; SUBCUTANEOUS at 06:09

## 2020-07-14 RX ADMIN — PANTOPRAZOLE SODIUM 40 MG: 40 TABLET, DELAYED RELEASE ORAL at 22:05

## 2020-07-14 RX ADMIN — HYDROCODONE BITARTRATE AND ACETAMINOPHEN 1 TABLET: 10; 325 TABLET ORAL at 06:09

## 2020-07-14 RX ADMIN — DIPHENHYDRAMINE HYDROCHLORIDE 25 MG: 25 CAPSULE ORAL at 15:09

## 2020-07-14 RX ADMIN — ONDANSETRON 4 MG: 2 INJECTION INTRAMUSCULAR; INTRAVENOUS at 03:39

## 2020-07-14 RX ADMIN — Medication 10 ML: at 15:10

## 2020-07-14 RX ADMIN — ONDANSETRON 4 MG: 2 INJECTION INTRAMUSCULAR; INTRAVENOUS at 15:09

## 2020-07-14 RX ADMIN — HYDROXYUREA 500 MG: 500 CAPSULE ORAL at 09:29

## 2020-07-14 RX ADMIN — DEXTROSE MONOHYDRATE, SODIUM CHLORIDE, AND POTASSIUM CHLORIDE 50 ML/HR: 50; 4.5; 2.24 INJECTION, SOLUTION INTRAVENOUS at 10:55

## 2020-07-14 RX ADMIN — CITALOPRAM HYDROBROMIDE 10 MG: 20 TABLET ORAL at 22:05

## 2020-07-14 RX ADMIN — ONDANSETRON 4 MG: 2 INJECTION INTRAMUSCULAR; INTRAVENOUS at 10:56

## 2020-07-14 RX ADMIN — ONDANSETRON 4 MG: 2 INJECTION INTRAMUSCULAR; INTRAVENOUS at 19:38

## 2020-07-14 RX ADMIN — DIPHENHYDRAMINE HYDROCHLORIDE 25 MG: 25 CAPSULE ORAL at 03:39

## 2020-07-14 RX ADMIN — HYDROCODONE BITARTRATE AND ACETAMINOPHEN 1 TABLET: 10; 325 TABLET ORAL at 12:17

## 2020-07-14 RX ADMIN — HEPARIN SODIUM 5000 UNITS: 5000 INJECTION INTRAVENOUS; SUBCUTANEOUS at 22:06

## 2020-07-14 RX ADMIN — HYDROMORPHONE HYDROCHLORIDE 1 MG: 1 INJECTION, SOLUTION INTRAMUSCULAR; INTRAVENOUS; SUBCUTANEOUS at 03:39

## 2020-07-14 NOTE — PROGRESS NOTES
Problem: Falls - Risk of  Goal: *Absence of Falls  Description: Document Aracelidebora [facility] Fall Risk and appropriate interventions in the flowsheet.   Outcome: Progressing Towards Goal  Note: Fall Risk Interventions:            Medication Interventions: Teach patient to arise slowly         History of Falls Interventions: Bed/chair exit alarm, Consult care management for discharge planning, Evaluate medications/consider consulting pharmacy, Investigate reason for fall         Problem: Pain  Goal: *Control of Pain  Outcome: Progressing Towards Goal

## 2020-07-14 NOTE — PROGRESS NOTES
* No surgery found *  * No surgery found *  Bedside shift change report given to [facility] (oncoming nurse) by Rao Payton RN (offgoing nurse). Report included the following information SBAR and Kardex. Zone Phone:   [phone]    Significant changes during shift:  Continued pain management- frequency decreased    Patient Information    Liliana Hammonds  64 y.o.  7/4/2020  6:52 AM by Daria Castillo MD. Liliana Hammonds was admitted from Home    Problem List    Patient Active Problem List    Diagnosis Date Noted    AVN of femur ([facility] [address] 01/25/2020     Priority: 3 - Three    Hypokalemia 01/25/2020     Priority: 3 - Three    Viral gastritis 07/06/2020    Dehydration 07/06/2020    UTI (urinary tract infection) 07/04/2020    Acute gastroenteritis 10/27/2019    Class 1 obesity due to excess calories with serious comorbidity and body mass index (BMI) of 30.0 to 30.9 in adult 08/13/2019    Sickle cell disease ([facility] [address] 02/10/2019    Overweight (BMI 25.0-29.9) 02/07/2019    Sickle cell pain crisis ([address] 09/08/2018    Carpal tunnel syndrome of right wrist 06/12/2018    Pulsatile tinnitus 10/31/2017    Sickle cell crisis ([address] 05/22/2017    Depression 10/05/2014    Hypertension 10/03/2014    Venous insufficiency 10/03/2014     Past Medical History:   Diagnosis Date    Anemia     SC hemoglobinopathy    Chronic pain     Depression     Hypertension     Liver disease     hepatitis C; pt reports \"cured\"    Sickle cell disease ([facility] [address]      Core Measures:    CVA: No No  CHF:No No  PNA:No No    Activity Status:    OOB to Chair No  Ambulated this shift Yes   Bed Rest No    DVT prophylaxis:    DVT prophylaxis Med- Yes  DVT prophylaxis SCD or SARA- No     Patient Safety:    Falls Score Total Score: 2  Safety Level_______  Bed Alarm On? No  Sitter?  No    Plan for upcoming shift: Pain management  Discharge Plan: Yes home    Active Consults:  None

## 2020-07-14 NOTE — PROGRESS NOTES
General Daily Progress Note    Admit Date: 7/4/2020    Subjective:     Patient states that she feels better. .     Current Facility-Administered Medications   Medication Dose Route Frequency    dextrose 5% - 0.45% NaCl with KCl 30 mEq/L infusion   IntraVENous CONTINUOUS    HYDROcodone-acetaminophen (NORCO)  mg tablet 1 Tab  1 Tab Oral Q6H PRN    lactobac ac& pc-s.therm-b.anim (CARLITOS Q/RISAQUAD)  1 Cap Oral DAILY    fentaNYL (DURAGESIC) 75 mcg/hr patch 1 Patch  1 Patch TransDERmal Q72H    sodium chloride (NS) flush 5-10 mL  5-10 mL IntraVENous PRN    citalopram (CELEXA) tablet 10 mg  10 mg Oral QHS    hydroxyurea (HYDREA) chemo cap 500 mg  500 mg Oral BID    pantoprazole (PROTONIX) tablet 40 mg  40 mg Oral QHS    losartan (COZAAR) tablet 50 mg  50 mg Oral QHS    sodium chloride (NS) flush 5-40 mL  5-40 mL IntraVENous Q8H    sodium chloride (NS) flush 5-40 mL  5-40 mL IntraVENous PRN    acetaminophen (TYLENOL) tablet 650 mg  650 mg Oral Q6H PRN    docusate sodium (COLACE) capsule 100 mg  100 mg Oral DAILY PRN    heparin (porcine) injection 5,000 Units  5,000 Units SubCUTAneous Q8H    diphenhydrAMINE (BENADRYL) capsule 25 mg  25 mg Oral Q4H PRN    ondansetron (ZOFRAN) injection 4 mg  4 mg IntraVENous Q4H PRN        Review of Systems  A comprehensive review of systems was negative. Objective:     Patient Vitals for the past 24 hrs:   BP Temp Pulse Resp SpO2 Weight   07/14/20 0638 94/60 98.9 °F (37.2 °C) 80 16 100 %    07/14/20 0336 101/65 98.6 °F (37 °C) 79 18 98 %    07/13/20 2316 103/57 98.9 °F (37.2 °C) 81 18 97 %    07/13/20 1851 108/61 98.4 °F (36.9 °C) 78 18 96 %    07/13/20 1543 107/59 98.1 °F (36.7 °C) 79 18 100 %    07/13/20 1444      208 lb 9.6 oz (94.6 kg)   07/13/20 1102 101/49 98.3 °F (36.8 °C) 80 16 98 %      No intake/output data recorded.   07/12 1901 - 07/14 0700  In: 1080 [P.O.:480; I.V.:600]  Out: -     Physical Exam:   Visit Vitals  BP 94/60   Pulse 80   Temp 98.9 °F (37.2 °C)   Resp 16   Ht 5' 10\" (1.778 m)   Wt 208 lb 9.6 oz (94.6 kg)   SpO2 100%   Breastfeeding No   BMI 29.93 kg/m²     General appearance: alert, cooperative, no distress, appears stated age  Neck: supple, symmetrical, trachea midline, no adenopathy, thyroid: not enlarged, symmetric, no tenderness/mass/nodules, no carotid bruit and no JVD  Lungs: clear to auscultation bilaterally  Heart: regular rate and rhythm, S1, S2 normal, no murmur, click, rub or gallop  Abdomen: soft, non-tender. Bowel sounds normal. No masses,  no organomegaly  Extremities: extremities normal, atraumatic, no cyanosis or edema    Assessment:     Active Problems:    Sickle cell crisis ([facility] [address] (5/22/2017)      UTI (urinary tract infection) (7/4/2020)      Viral gastritis (7/6/2020)      Dehydration (7/6/2020)        Plan:     1. Sickle crisis continues to improve. Analgesics will continue to be weaned and if all goes well discharge tomorrow. 2.  Hydration adequate.

## 2020-07-15 VITALS
OXYGEN SATURATION: 100 % | DIASTOLIC BLOOD PRESSURE: 58 MMHG | HEIGHT: 70 IN | HEART RATE: 75 BPM | TEMPERATURE: 98.7 F | RESPIRATION RATE: 18 BRPM | WEIGHT: 213.4 LBS | SYSTOLIC BLOOD PRESSURE: 101 MMHG | BODY MASS INDEX: 30.55 KG/M2

## 2020-07-15 PROCEDURE — 94760 N-INVAS EAR/PLS OXIMETRY 1: CPT

## 2020-07-15 PROCEDURE — 74011250637 HC RX REV CODE- 250/637: Performed by: INTERNAL MEDICINE

## 2020-07-15 PROCEDURE — 74011250636 HC RX REV CODE- 250/636: Performed by: INTERNAL MEDICINE

## 2020-07-15 RX ORDER — FENTANYL 75 UG/H
1 PATCH TRANSDERMAL
Qty: 10 PATCH | Refills: 0 | Status: SHIPPED
Start: 2020-07-15 | End: 2020-07-27 | Stop reason: SDUPTHER

## 2020-07-15 RX ADMIN — Medication 1 CAPSULE: at 09:22

## 2020-07-15 RX ADMIN — HYDROCODONE BITARTRATE AND ACETAMINOPHEN 1 TABLET: 10; 325 TABLET ORAL at 09:22

## 2020-07-15 RX ADMIN — DIPHENHYDRAMINE HYDROCHLORIDE 25 MG: 25 CAPSULE ORAL at 05:10

## 2020-07-15 RX ADMIN — DIPHENHYDRAMINE HYDROCHLORIDE 25 MG: 25 CAPSULE ORAL at 00:48

## 2020-07-15 RX ADMIN — HYDROXYUREA 500 MG: 500 CAPSULE ORAL at 09:24

## 2020-07-15 RX ADMIN — Medication 10 ML: at 05:22

## 2020-07-15 RX ADMIN — HEPARIN SODIUM 5000 UNITS: 5000 INJECTION INTRAVENOUS; SUBCUTANEOUS at 05:10

## 2020-07-15 RX ADMIN — ONDANSETRON 4 MG: 2 INJECTION INTRAMUSCULAR; INTRAVENOUS at 05:10

## 2020-07-15 RX ADMIN — HYDROCODONE BITARTRATE AND ACETAMINOPHEN 1 TABLET: 10; 325 TABLET ORAL at 00:47

## 2020-07-15 RX ADMIN — ONDANSETRON 4 MG: 2 INJECTION INTRAMUSCULAR; INTRAVENOUS at 00:48

## 2020-07-15 RX ADMIN — DOCUSATE SODIUM 100 MG: 100 CAPSULE, LIQUID FILLED ORAL at 00:48

## 2020-07-15 NOTE — PROGRESS NOTES
Discharge instructions, meds, all gone over. All questions answered.  Pt ride picked pt up at front entrance

## 2020-07-15 NOTE — PROGRESS NOTES
Problem: Falls - Risk of  Goal: *Absence of Falls  Description: Document Jb Sg Fall Risk and appropriate interventions in the flowsheet.   Outcome: Progressing Towards Goal  Note: Fall Risk Interventions:            Medication Interventions: Teach patient to arise slowly, Patient to call before getting OOB         History of Falls Interventions: Bed/chair exit alarm, Consult care management for discharge planning, Evaluate medications/consider consulting pharmacy, Investigate reason for fall         Problem: Patient Education: Go to Patient Education Activity  Goal: Patient/Family Education  Outcome: Progressing Towards Goal     Problem: Pain  Goal: *Control of Pain  Outcome: Progressing Towards Goal  Goal: *PALLIATIVE CARE:  Alleviation of Pain  Outcome: Progressing Towards Goal     Problem: Patient Education: Go to Patient Education Activity  Goal: Patient/Family Education  Outcome: Progressing Towards Goal

## 2020-07-15 NOTE — PROGRESS NOTES
Bedside shift change report given to Charo Madison (oncoming nurse) by [facility] RN (offgoing nurse). Report included the following information SBAR and Kardex.     Zone Phone:   [phone]     Significant changes during shift:  Continue pain management     Patient Information     Liliana Hammonds  64 y.o.  7/4/2020  6:52 AM by Daria Castillo MD. Liliana Hammonds was admitted from Home     Problem List           Patient Active Problem List     Diagnosis Date Noted    AVN of femur ([address] 01/25/2020       Priority: 3 - Three    Hypokalemia 01/25/2020       Priority: 3 - Three    Viral gastritis 07/06/2020    Dehydration 07/06/2020    UTI (urinary tract infection) 07/04/2020    Acute gastroenteritis 10/27/2019    Class 1 obesity due to excess calories with serious comorbidity and body mass index (BMI) of 30.0 to 30.9 in adult 08/13/2019    Sickle cell disease ([address] 02/10/2019    Overweight (BMI 25.0-29.9) 02/07/2019    Sickle cell pain crisis ([address] 09/08/2018    Carpal tunnel syndrome of right wrist 06/12/2018    Pulsatile tinnitus 10/31/2017    Sickle cell crisis ([address] 05/22/2017    Depression 10/05/2014    Hypertension 10/03/2014    Venous insufficiency 10/03/2014          Past Medical History:   Diagnosis Date    Anemia       SC hemoglobinopathy    Chronic pain      Depression      Hypertension      Liver disease       hepatitis C; pt reports \"cured\"    Sickle cell disease ([address]       Core Measures:     CVA: No No  CHF:No No  PNA:No No     Activity Status:     OOB to Chair No  Ambulated this shift Yes   Bed Rest No     DVT prophylaxis:     DVT prophylaxis Med- Yes  DVT prophylaxis SCD or SARA- No      Patient Safety:     Falls Score Total Score: 2  Safety Level_______  Bed Alarm On? No  Sitter?  No     Plan for upcoming shift: Pain management  Discharge Plan: Yes home     Active Consults:  None

## 2020-07-15 NOTE — PROGRESS NOTES
KRISTINA Plan:     * Home with f/u apts     > PCP f/u apt secured for d/c; details reflected in AVS  > Pt's friend will be providing transportation at d/c     9:39 AM: CM met with pt at bedside to check in and review KRISTINA plan for d/c re: home with f/u apts. Pt confirmed she is agreeable to d/c plan; no questions or concerns identified. CM confirmed pt has PCP f/u apt secured for d/c; details in AVS. Pt reported her friend will be providing transportation at d/c; anticipated arrival 10:00 AM.    Medicare pt has received, reviewed, and signed 2nd IM letter informing them of their right to appeal the discharge. Signed copy has been placed on pt bedside chart. No further CM needs identified. CM notified pt's nurse of d/c. Initial note: CM acknowledged d/c. CM sent request to CM specialist to secure PCP f/u apt for d/c; request pending. CM will f/u with pt at bedside to check in and address any questions/concerns. Care Management Interventions  PCP Verified by CM: Yes  Mode of Transport at Discharge:  Other (see comment)(pt's friend providing transport via private vehicle)  Transition of Care Consult (CM Consult): Discharge Planning, Other(home with f/u apts)  MyChart Signup: No  Discharge Durable Medical Equipment: No  Physical Therapy Consult: No  Occupational Therapy Consult: No  Speech Therapy Consult: No  Current Support Network: Own Home, Lives Alone, Family Lives Nearby  Confirm Follow Up Transport: [address] Provided?: No  Discharge Location  Discharge Placement: Home(f/u apts)    Julane Severs, [address] [place] [address], [facility]

## 2020-07-16 ENCOUNTER — PATIENT OUTREACH (OUTPATIENT)
Dept: CASE MANAGEMENT | Age: 60
End: 2020-07-16

## 2020-07-16 NOTE — DISCHARGE SUMMARY
[address] [place] [address] SUMMARY    Name:  Shanda Rankin  MR#:  [id number]  :  1960  ACCOUNT #:  [de-identified]  ADMIT DATE:  2020  DISCHARGE DATE:  07/15/2020    HISTORY OF PRESENT ILLNESS:  The patient is a 61-year-old lady who was doing well up until approximately 48 hours when she developed nausea and progressive vomiting. This was accompanied by bloating as well as pain in her low back area, legs and arms. She was evaluated and felt to have a probable viral gastritis complicated by the onset of a sickle cell crisis. She has an S-C hemoglobinopathy. Past medical history, social history, review of systems, family history, physical examination is as in admitting H and P.    LABORATORY VALUES:  Initial hemoglobin was 10.1, white count 8.6. MCV was 113.1, platelet count 719,452 with the following differential 90 segs, 7 lymphocytes, 3 monocytes. At the time of discharge, hemoglobin was 8.6, white count 5.7. Abnormalities on the comprehensive profile on admission revealed a bilirubin of 1.5, globulin 5.8, alk phos 123, potassium 3.2, and a glucose of 144. At the time of discharge potassium was 4.5 with alkaline phosphatase of 87. Urinalysis revealed 5-10 wbc's per high-powered field, 5-10 rbc's per high-powered field. Blood cultures were negative as was the case with urine culture. The colony count of greater than 100,000 colonies/mL was mixed urogenital woodrow which was not deemed clinically significant. RADIOGRAPHS:  Chest x-ray negative. HOSPITAL COURSE:  The patient was admitted and treated symptomatically for her GI symptoms. She related to me that she did not have any lower tract GI symptoms. With antiemetics and a PPI, her upper tract GI symptoms gradually improved. She had no further nausea, vomiting and was able to tolerate a regular diet. The bulk of the hospital stay was spent treating her painful sickle crisis.   She was treated with Dilaudid 1 mg q.3 h. along with Duragesic 75 mcg every 3 days and parenteral fluids. She is slow to improve but gradually began to do so such that at the time of discharge she was relatively asymptomatic. Hospital course was otherwise uneventful. There was no evidence of any millicent infections. FINAL DIAGNOSES:  1.  Painful sickle crisis. 2.  Viral gastritis. 3.  Primary hypertension. 4.  Mild depression, stable. 5.  Obesity. DISPOSITION:  1. The patient will be discharged home, ambulatory on a regular diet. 2.  The patient remains a full code. 3.  Discharge medications include the following:  Fentanyl patch 75 mcg q. 3 days, Celexa 10 mg day, Benadryl 25 mg q.4 h. p.r.n. Folic acid 1 mg daily, hydrocodone 10/325 mg q.6 h. p.r.n., hydroxyurea 500 mg b.i.d., omeprazole 20 mg daily, promethazine 25 mg q.6 h. p.r.n., telmisartan 40 mg daily. 4.  She will return to the office in the next 3-5 days.       Darryle Spangle, MD [id number]  D:  07/15/2020 21:40  T:  07/16/2020 4:17  JOB #:  [id number]

## 2020-07-20 ENCOUNTER — OFFICE VISIT (OUTPATIENT)
Dept: INTERNAL MEDICINE CLINIC | Age: 60
End: 2020-07-20

## 2020-07-20 VITALS
BODY MASS INDEX: 29.85 KG/M2 | RESPIRATION RATE: 16 BRPM | DIASTOLIC BLOOD PRESSURE: 84 MMHG | WEIGHT: 208.5 LBS | HEART RATE: 91 BPM | SYSTOLIC BLOOD PRESSURE: 135 MMHG | TEMPERATURE: 99 F | OXYGEN SATURATION: 95 % | HEIGHT: 70 IN

## 2020-07-20 DIAGNOSIS — F32.9 REACTIVE DEPRESSION: ICD-10-CM

## 2020-07-20 DIAGNOSIS — D57.00 SICKLE CELL PAIN CRISIS (HCC): Primary | ICD-10-CM

## 2020-07-20 DIAGNOSIS — E66.3 OVERWEIGHT: ICD-10-CM

## 2020-07-20 DIAGNOSIS — K29.00 ACUTE GASTRITIS WITHOUT HEMORRHAGE, UNSPECIFIED GASTRITIS TYPE: ICD-10-CM

## 2020-07-20 DIAGNOSIS — I10 ESSENTIAL HYPERTENSION: ICD-10-CM

## 2020-07-20 NOTE — PROGRESS NOTES
Chief Complaint   Patient presents with    Transitions Of Care     Patient is here for Transitions of care. 1. Have you been to the ER, urgent care clinic since your last visit? Hospitalized since your last visit? Yes Reason for visit: Sickal cell crisis    2. Have you seen or consulted any other health care providers outside of the [address] [place] [address] since your last visit? Include any pap smears or colon screening.  No

## 2020-07-27 DIAGNOSIS — D57.00 SICKLE CELL CRISIS (HCC): ICD-10-CM

## 2020-07-27 RX ORDER — HYDROCODONE BITARTRATE AND ACETAMINOPHEN 10; 325 MG/1; MG/1
1 TABLET ORAL
Qty: 120 TAB | Refills: 0 | Status: SHIPPED | OUTPATIENT
Start: 2020-07-27 | End: 2020-08-26 | Stop reason: SDUPTHER

## 2020-07-27 RX ORDER — TELMISARTAN 40 MG/1
40 TABLET ORAL
Qty: 30 TAB | Refills: 11 | Status: SHIPPED | OUTPATIENT
Start: 2020-07-27 | End: 2021-07-20 | Stop reason: SDUPTHER

## 2020-07-27 RX ORDER — FENTANYL 75 UG/H
1 PATCH TRANSDERMAL
Qty: 10 PATCH | Refills: 0 | Status: SHIPPED | OUTPATIENT
Start: 2020-07-27 | End: 2020-08-26

## 2020-08-23 NOTE — PROGRESS NOTES
[address] [facility] [place] [facility] and Primary [facility]  [place] [address]  [address] [place] [address]  Phone:  [phone]  Fax: [phone]       Chief Complaint   Patient presents with   [address]     Patient is here for Transitions of care. .      SUBJECTIVE:    Eliud Ortiz is a 64 y.o. female Comes in for return visit, having most recently been hospitalized for sickle cell crisis. This is complicated by gastritis, presumably viral related. Her GI symptoms rapidly improved, but she was left with persistent pain, prolonging her hospital stay. She has been doing well since being discharged from hospital.  She is continuing her PO analgesics. She has a past history of primary hypertension, depression, as well as being overweight. Current Outpatient Medications   Medication Sig Dispense Refill    promethazine (PHENERGAN) 25 mg tablet Take 1 Tab by mouth every eight (8) hours as needed for Nausea. 90 Tab 11    citalopram (CELEXA) 10 mg tablet TAKE 1 TABLET BY MOUTH EVERY NIGHT AT BEDTIME 90 Tab 3    hydroxyurea (HYDREA) 500 mg capsule 1 tablet two times a day 60 Cap 11    hydrocortisone (CORTISONE) 1 % topical cream Apply  to affected area two (2) times daily as needed for Skin Irritation. use thin layer 30 g 11    omeprazole (PRILOSEC OTC) 20 mg tablet Take 20 mg by mouth nightly.  fentaNYL (DURAGESIC) 75 mcg/hr 1 Patch by TransDERmal route every seventy-two (72) hours for 30 days. Max Daily Amount: 1 Patch. Dx.d57.00 10 Patch 0    HYDROcodone-acetaminophen (NORCO)  mg tablet Take 1 Tab by mouth every six (6) hours as needed for Pain for up to 30 days. Max Daily Amount: 4 Tabs. Dx.d57.00 120 Tab 0    telmisartan (MICARDIS) 40 mg tablet Take 1 Tab by mouth nightly.  27 Tab 11     Past Medical History:   Diagnosis Date    Anemia     SC hemoglobinopathy    Chronic pain     Depression     Hypertension     Liver disease     hepatitis C; pt reports \"cured\"    Sickle cell disease [facility])      Past Surgical History:   Procedure Laterality Date    BREAST SURGERY PROCEDURE UNLISTED      2 cysts removed from R breast    CHEST SURGERY PROCEDURE UNLISTED      status post thor for removal of a benign     HX BREAST BIOPSY Right 05/2007    negative    HX CHOLECYSTECTOMY      HX ORTHOPAEDIC      bony curettage of an ostial myelitis focus     Allergies   Allergen Reactions    Dilaudid [Hydromorphone (Bulk)] Itching     Can tolerate with benadryl and ondansetron    Percocet [Oxycodone-Acetaminophen] Itching         REVIEW OF SYSTEMS:  General: negative for - chills or fever  ENT: negative for - headaches, nasal congestion or tinnitus  Respiratory: negative for - cough, hemoptysis, shortness of breath or wheezing  Cardiovascular : negative for - chest pain, edema, palpitations or shortness of breath  Gastrointestinal: negative for - abdominal pain, blood in stools, heartburn or nausea/vomiting  Genito-Urinary: no dysuria, trouble voiding, or hematuria  Musculoskeletal: negative for - gait disturbance, joint pain, joint stiffness or joint swelling  Neurological: no TIA or stroke symptoms  Hematologic: no bruises, no bleeding, no swollen glands  Integument: no lumps, mole changes, nail changes or rash  Endocrine: no malaise/lethargy or unexpected weight changes      Social History     Socioeconomic History    Marital status:      Spouse name: Not on file    Number of children: 2    Years of education: Not on file    Highest education level: Not on file   Occupational History    Occupation: disabled---Sickle cell disease   Tobacco Use    Smoking status: Never Smoker    Smokeless tobacco: Never Used   Substance and Sexual Activity    Alcohol use: No    Drug use: No    Sexual activity: Yes     Partners: Male     Family History   Problem Relation Age of Onset    Hypertension Mother     Hypertension Father        OBJECTIVE:    Visit Vitals  /84   Pulse 91   Temp 99 °F (37.2 °C)   Resp 16   Ht 5' 10\" (1.778 m)   Wt 208 lb 8 oz (94.6 kg)   SpO2 95%   BMI 29.92 kg/m²     CONSTITUTIONAL: well , well nourished, appears age appropriate  EYES: perrla, eom intact  ENMT:moist mucous membranes, pharynx clear  NECK: supple. Thyroid normal  RESPIRATORY: Chest: clear to ascultation and percussion   CARDIOVASCULAR: Heart: regular rate and rhythm  GASTROINTESTINAL: Abdomen: soft, bowel sounds active  HEMATOLOGIC: no pathological lymph nodes palpated  MUSCULOSKELETAL: Extremities: no edema, pulse 1+   INTEGUMENT: No unusual rashes or suspicious skin lesions noted. Nails appear normal.  NEUROLOGIC: non-focal exam   MENTAL STATUS: alert and oriented, appropriate affect      ASSESSMENT:  1. Sickle cell pain crisis ([address]    2. Acute gastritis without hemorrhage, unspecified gastritis type    3. Essential hypertension    4. Reactive depression    5. Overweight        PLAN:    1. Her sickle cell crisis has done well. She is doing reasonably well now and is somewhat in a subclinical state. She continues her analgesics as described. 2. She has no further GI symptoms. Her gastritis has indeed resolved. 3. Her blood pressure is excellent today, no adjustments are made. 4. Her depression is also stable and she continues her antidepressant. 5. I again encouraged weight reduction. This can be accomplished by eating meals, eliminating snacks and avoiding the consumption of processed carbohydrates. Follow-up and Dispositions    · Return in about 3 months (around 10/20/2020).            Perry Pisano MD

## 2020-08-26 DIAGNOSIS — D57.00 SICKLE CELL CRISIS (HCC): ICD-10-CM

## 2020-09-01 RX ORDER — HYDROCODONE BITARTRATE AND ACETAMINOPHEN 10; 325 MG/1; MG/1
1 TABLET ORAL
Qty: 120 TAB | Refills: 0 | Status: SHIPPED | OUTPATIENT
Start: 2020-09-01 | End: 2020-09-29 | Stop reason: SDUPTHER

## 2020-09-16 DIAGNOSIS — D57.00 SICKLE CELL CRISIS (HCC): Primary | ICD-10-CM

## 2020-09-17 RX ORDER — FENTANYL 75 UG/H
1 PATCH TRANSDERMAL
Qty: 10 PATCH | Refills: 0 | Status: SHIPPED | OUTPATIENT
Start: 2020-09-17 | End: 2020-10-17

## 2020-09-29 DIAGNOSIS — D57.00 SICKLE CELL CRISIS (HCC): ICD-10-CM

## 2020-09-29 RX ORDER — HYDROCODONE BITARTRATE AND ACETAMINOPHEN 10; 325 MG/1; MG/1
1 TABLET ORAL
Qty: 120 TAB | Refills: 0 | Status: SHIPPED | OUTPATIENT
Start: 2020-09-29 | End: 2020-10-29 | Stop reason: SDUPTHER

## 2020-10-22 DIAGNOSIS — D57.00 HB-SS DISEASE WITH CRISIS (HCC): Primary | ICD-10-CM

## 2020-10-29 DIAGNOSIS — D57.00 SICKLE CELL CRISIS (HCC): ICD-10-CM

## 2020-10-29 RX ORDER — HYDROCODONE BITARTRATE AND ACETAMINOPHEN 10; 325 MG/1; MG/1
1 TABLET ORAL
Qty: 120 TAB | Refills: 0 | Status: SHIPPED | OUTPATIENT
Start: 2020-10-29 | End: 2020-11-28

## 2020-10-31 RX ORDER — HYDROXYUREA 500 MG/1
CAPSULE ORAL
Qty: 60 CAP | Refills: 11 | Status: SHIPPED | OUTPATIENT
Start: 2020-10-31 | End: 2021-11-22

## 2020-11-03 RX ORDER — FENTANYL 75 UG/H
1 PATCH TRANSDERMAL
Qty: 10 PATCH | Refills: 0 | Status: SHIPPED | OUTPATIENT
Start: 2020-11-03 | End: 2020-11-30 | Stop reason: SDUPTHER

## 2020-11-30 DIAGNOSIS — D57.00 HB-SS DISEASE WITH CRISIS (HCC): ICD-10-CM

## 2020-12-01 RX ORDER — FENTANYL 75 UG/H
1 PATCH TRANSDERMAL
Qty: 10 PATCH | Refills: 0 | Status: SHIPPED | OUTPATIENT
Start: 2020-12-01 | End: 2020-12-31

## 2020-12-01 RX ORDER — HYDROCODONE BITARTRATE AND ACETAMINOPHEN 10; 325 MG/1; MG/1
1 TABLET ORAL
Qty: 120 TAB | Refills: 0 | Status: SHIPPED | OUTPATIENT
Start: 2020-12-01 | End: 2020-12-31

## 2020-12-30 NOTE — MR AVS SNAPSHOT
Injectable Neurology Medication Teaching        Patient Name/:     Dominga Suarez    1986  Injectable Neurology Medication Regimen:  Aimovig 70mg SQ Q28 days  Date Started Medication: 2020               Initial Teaching Follow Up Comments      Safety      Storage instructions (away from children; away from heat/cold, sunlight, or moisture)       “How are you storing your medications?”, reminders on storage, proper handling (away from children, managing waste, etc.), disposal of medication with D/C or dosage change     Patient counseled on appropriate storage of medication. Store in refrigerator, away from pets and children. Advised to inspect each syringe prior to use and discard each syringe after use in an appropriate container. Pt verbalized understanding.       Adherence       patient and/or caregiver on how to take medication, take with/without food, assess their adherence potential, stress importance of adherence, ways to manage adherence (pill boxes, phone reminders, calendars), what to do if miss a dose    “How are you taking your medication?” “How are you remembering to take your medication?”, “How many doses have you missed?”, determine reasons for non-adherence (not remembering, side effects, etc), ways to improve, overadherence? Remind patient of ways to improve/maintain adherence Reviewed plan for Aimovig 70mg SQ Q28 days (d/t insurance denial of 140mg dosage without trial of 70mg). Discussed importance of compliance. LD given in office on 2020. First self-injection of Aimovig due 2021. Script processed at [facility] and mailed to patient.     Side Effects/Adverse Reactions       patient on potential side effects, s/s, ways to manage, when to call MD/seek help       Determine if patient experiencing side effects, ways to manage  Discussed potential side effects including but not limited to: injection site reactions, constipation,  [facility] 
 
 
 [id number] 
[phone] Patient: Cj Cardenas MRN: [id number] :1960 Visit Information Date & Time Provider Department Dept. Phone Encounter #  
 2018  1:15 PM [facility] [phone] [id number] Your Appointments 2018 10:45 AM  
Any with Morro Flores MD  
[address] [place] [address] and [facility] Appt Note: 3 Month Follow Up; 2 month follow up  
 [address] [facility]  
  
   
 [id number] Upcoming Health Maintenance Date Due Shingrix Vaccine Age 50> (1 of 2) 10/1/2012 MEDICARE YEARLY EXAM 3/14/2018 PAP AKA CERVICAL CYTOLOGY 2018 Influenza Age 5 to Adult 2018 FOBT Q 1 YEAR AGE 50-75 2020* Pneumococcal 19-64 Highest Risk (3 of 3 - PCV13) 10/31/2018 BREAST CANCER SCRN MAMMOGRAM 5/3/2020 DTaP/Tdap/Td series (2 - Td) 2026 *Topic was postponed. The date shown is not the original due date. Allergies as of 2018  Review Complete On: 2018 By: Joanne Espinosa Severity Noted Reaction Type Reactions Dilaudid [Hydromorphone (Bulk)]  10/03/2014    Itching Can tolerate with benadryl and ondansetron Percocet [Oxycodone-acetaminophen]  10/03/2014    Itching Current Immunizations  Reviewed on 2017 Name Date Influenza Vaccine (Quad) PF 10/31/2017 Not reviewed this visit You Were Diagnosed With   
  
 Codes Comments Hb-SS disease with crisis [facility])    -  Primary ICD-10-CM: D57.00 ICD-9-CM: 282.62 Essential hypertension     ICD-10-CM: I10 
ICD-9-CM: 401.9 Depression, unspecified depression type     ICD-10-CM: F32.9 ICD-9-CM: 318 Vitals BP Pulse Temp Resp Height(growth percentile) Weight(growth percentile) 148/80 93 98.9 °F (37.2 °C) (Oral) 16 5' 11\" (1.803 m) 190 lb 3.2 oz (86.3 kg) SpO2 BMI OB Status Smoking Status 96% 26.53 kg/m2 Postmenopausal Never Smoker Vitals History BMI and BSA Data Body Mass Index Body Surface Area  
 26.53 kg/m 2 2.08 m 2 Preferred Pharmacy Pharmacy Name Phone [address] [phone] Your Updated Medication List  
  
   
This list is accurate as of 9/24/18  3:12 PM.  Always use your most recent med list.  
  
  
  
  
 citalopram 10 mg tablet Commonly known as:  CELEXA  
TAKE 1 TABLET BY MOUTH EVERY NIGHT AT BEDTIME  
  
 cyclobenzaprine 10 mg tablet Commonly known as:  FLEXERIL Take 1 Tab by mouth three (3) times daily as needed for Muscle Spasm(s). fentaNYL 75 mcg/hr Commonly known as:  DURAGESIC  
1 Patch by TransDERmal route every seventy-two (72) hours. Max Daily Amount: 1 Patch. fluocinoNIDE 0.05 % topical cream  
Commonly known as:  LIDEX  
two (2) times daily as needed. folic acid 1 mg tablet Commonly known as:  Google Take 1 Tab by mouth daily. HYDROcodone-acetaminophen  mg tablet Commonly known as:  March Castles Take 1 Tab by mouth every six (6) hours as needed. Max Daily Amount: 4 Tabs. Indications: sickl;e cell crisis  
  
 losartan-hydroCHLOROthiazide 50-12.5 mg per tablet Commonly known as:  HYZAAR  
TAKE 1 TABLET BY MOUTH DAILY  
  
 OMEPRAZOLE PO Take 20 mg by mouth daily. ondansetron 4 mg disintegrating tablet Commonly known as:  ZOFRAN ODT Take 1 Tab by mouth every eight (8) hours as needed for Nausea. promethazine 25 mg suppository Commonly known as:  PHENERGAN Insert 1 Suppository into rectum every six (6) hours as needed for Nausea. Prescriptions Printed Refills  HYDROcodone-acetaminophen (NORCO)  mg tablet 0  
 and hypersensitivity reactions.  Counseled pt on importance of rotating injection sites. Pt verbalized understanding.      Miscellaneous      Food interactions, DDIs, financial issues Determine if patient started any new medications (analyze for DDI) No DDIs identified with planned medication list and Aimovig.       Additional Notes: Discussed aforementioned material with patient by phone. All questions and concerns addressed. Patient provided with my contact information and instructed to call if any additional questions arise. Notified [facility] retail of new script.      Sig: Take 1 Tab by mouth every six (6) hours as needed. Max Daily Amount: 4 Tabs. Indications: sickl;e cell crisis Class: Print Route: Oral  
  
Introducing [facility]! Dear [facility]: 
Thank you for requesting a [employer] account. Our records indicate that you already have an active [employer] account. You can access your account anytime at [email or web address] Did you know that you can access your hospital and ER discharge instructions at any time in [employer]? You can also review all of your test results from your hospital stay or ER visit. Additional Information If you have questions, please visit the Frequently Asked Questions section of the [employer] website at [email or web address] Remember, [employer] is NOT to be used for urgent needs. For medical emergencies, dial 911. Now available from your iPhone and Android! Please provide this summary of care documentation to your next provider. Your primary care clinician is listed as Ezequiel Lynne. If you have any questions after today's visit, please call [phone].

## 2021-01-06 DIAGNOSIS — D57.00 SICKLE CELL CRISIS (HCC): Primary | ICD-10-CM

## 2021-01-06 RX ORDER — HYDROCODONE BITARTRATE AND ACETAMINOPHEN 10; 325 MG/1; MG/1
1 TABLET ORAL
Qty: 120 TAB | Refills: 0 | Status: SHIPPED | OUTPATIENT
Start: 2021-01-06 | End: 2021-02-05

## 2021-01-07 ENCOUNTER — APPOINTMENT (OUTPATIENT)
Dept: GENERAL RADIOLOGY | Age: 61
End: 2021-01-07
Attending: PHYSICIAN ASSISTANT
Payer: MEDICARE

## 2021-01-07 ENCOUNTER — HOSPITAL ENCOUNTER (EMERGENCY)
Age: 61
Discharge: HOME | End: 2021-01-07
Attending: EMERGENCY MEDICINE
Payer: MEDICARE

## 2021-01-07 ENCOUNTER — APPOINTMENT (OUTPATIENT)
Dept: GENERAL RADIOLOGY | Age: 61
End: 2021-01-07
Attending: EMERGENCY MEDICINE
Payer: MEDICARE

## 2021-01-07 VITALS
WEIGHT: 208 LBS | DIASTOLIC BLOOD PRESSURE: 82 MMHG | OXYGEN SATURATION: 97 % | HEIGHT: 71 IN | SYSTOLIC BLOOD PRESSURE: 145 MMHG | BODY MASS INDEX: 29.12 KG/M2 | TEMPERATURE: 98.7 F | HEART RATE: 84 BPM | RESPIRATION RATE: 18 BRPM

## 2021-01-07 DIAGNOSIS — E86.0 DEHYDRATION: ICD-10-CM

## 2021-01-07 DIAGNOSIS — E87.6 HYPOKALEMIA: ICD-10-CM

## 2021-01-07 DIAGNOSIS — R11.10 VOMITING, INTRACTABILITY OF VOMITING NOT SPECIFIED, PRESENCE OF NAUSEA NOT SPECIFIED, UNSPECIFIED VOMITING TYPE: Primary | ICD-10-CM

## 2021-01-07 LAB
ALBUMIN SERPL-MCNC: 4.4 G/DL (ref 3.5–5)
ALBUMIN/GLOB SERPL: 0.8 {RATIO} (ref 1.1–2.2)
ALP SERPL-CCNC: 113 U/L (ref 45–117)
ALT SERPL-CCNC: 18 U/L (ref 12–78)
ANION GAP SERPL CALC-SCNC: 10 MMOL/L (ref 5–15)
AST SERPL-CCNC: 29 U/L (ref 15–37)
ATRIAL RATE: 92 BPM
BASOPHILS # BLD: 0 K/UL (ref 0–0.1)
BASOPHILS NFR BLD: 0 % (ref 0–1)
BILIRUB SERPL-MCNC: 1.5 MG/DL (ref 0.2–1)
BUN SERPL-MCNC: 12 MG/DL (ref 6–20)
BUN/CREAT SERPL: 12 (ref 12–20)
CALCIUM SERPL-MCNC: 9.4 MG/DL (ref 8.5–10.1)
CALCULATED P AXIS, ECG09: 60 DEGREES
CALCULATED R AXIS, ECG10: 46 DEGREES
CALCULATED T AXIS, ECG11: 38 DEGREES
CHLORIDE SERPL-SCNC: 106 MMOL/L (ref 97–108)
CO2 SERPL-SCNC: 22 MMOL/L (ref 21–32)
COMMENT, HOLDF: NORMAL
COVID-19 RAPID TEST, COVR: NOT DETECTED
CREAT SERPL-MCNC: 1.01 MG/DL (ref 0.55–1.02)
DIAGNOSIS, 93000: NORMAL
DIFFERENTIAL METHOD BLD: ABNORMAL
EOSINOPHIL # BLD: 0 K/UL (ref 0–0.4)
EOSINOPHIL NFR BLD: 0 % (ref 0–7)
ERYTHROCYTE [DISTWIDTH] IN BLOOD BY AUTOMATED COUNT: 14.2 % (ref 11.5–14.5)
GLOBULIN SER CALC-MCNC: 5.7 G/DL (ref 2–4)
GLUCOSE SERPL-MCNC: 153 MG/DL (ref 65–100)
HCT VFR BLD AUTO: 28.2 % (ref 35–47)
HEALTH STATUS, XMCV2T: NORMAL
HGB BLD-MCNC: 10.2 G/DL (ref 11.5–16)
IMM GRANULOCYTES # BLD AUTO: 0.1 K/UL (ref 0–0.04)
IMM GRANULOCYTES NFR BLD AUTO: 1 % (ref 0–0.5)
LIPASE SERPL-CCNC: 144 U/L (ref 73–393)
LYMPHOCYTES # BLD: 0.4 K/UL (ref 0.8–3.5)
LYMPHOCYTES NFR BLD: 5 % (ref 12–49)
MAGNESIUM SERPL-MCNC: 2.2 MG/DL (ref 1.6–2.4)
MCH RBC QN AUTO: 41 PG (ref 26–34)
MCHC RBC AUTO-ENTMCNC: 36.2 G/DL (ref 30–36.5)
MCV RBC AUTO: 113.3 FL (ref 80–99)
MONOCYTES # BLD: 0.5 K/UL (ref 0–1)
MONOCYTES NFR BLD: 6 % (ref 5–13)
NEUTS SEG # BLD: 7.9 K/UL (ref 1.8–8)
NEUTS SEG NFR BLD: 88 % (ref 32–75)
NRBC # BLD: 0.93 K/UL (ref 0–0.01)
NRBC BLD-RTO: 10.5 PER 100 WBC
P-R INTERVAL, ECG05: 158 MS
PLATELET # BLD AUTO: 294 K/UL (ref 150–400)
PMV BLD AUTO: 10.9 FL (ref 8.9–12.9)
POTASSIUM SERPL-SCNC: 2.9 MMOL/L (ref 3.5–5.1)
PROT SERPL-MCNC: 10.1 G/DL (ref 6.4–8.2)
Q-T INTERVAL, ECG07: 402 MS
QRS DURATION, ECG06: 82 MS
QTC CALCULATION (BEZET), ECG08: 497 MS
RBC # BLD AUTO: 2.49 M/UL (ref 3.8–5.2)
RBC MORPH BLD: ABNORMAL
SAMPLES BEING HELD,HOLD: NORMAL
SODIUM SERPL-SCNC: 138 MMOL/L (ref 136–145)
SOURCE, COVRS: NORMAL
SPECIMEN SOURCE, FCOV2M: NORMAL
SPECIMEN TYPE, XMCV1T: NORMAL
TROPONIN I SERPL-MCNC: <0.05 NG/ML
VENTRICULAR RATE, ECG03: 92 BPM
WBC # BLD AUTO: 8.9 K/UL (ref 3.6–11)
WBC MORPH BLD: ABNORMAL

## 2021-01-07 PROCEDURE — 93005 ELECTROCARDIOGRAM TRACING: CPT

## 2021-01-07 PROCEDURE — 71046 X-RAY EXAM CHEST 2 VIEWS: CPT

## 2021-01-07 PROCEDURE — 74011250637 HC RX REV CODE- 250/637: Performed by: EMERGENCY MEDICINE

## 2021-01-07 PROCEDURE — 84484 ASSAY OF TROPONIN QUANT: CPT

## 2021-01-07 PROCEDURE — 83690 ASSAY OF LIPASE: CPT

## 2021-01-07 PROCEDURE — 83735 ASSAY OF MAGNESIUM: CPT

## 2021-01-07 PROCEDURE — 80053 COMPREHEN METABOLIC PANEL: CPT

## 2021-01-07 PROCEDURE — 96361 HYDRATE IV INFUSION ADD-ON: CPT

## 2021-01-07 PROCEDURE — 87635 SARS-COV-2 COVID-19 AMP PRB: CPT

## 2021-01-07 PROCEDURE — 99284 EMERGENCY DEPT VISIT MOD MDM: CPT

## 2021-01-07 PROCEDURE — 74011250636 HC RX REV CODE- 250/636: Performed by: PHYSICIAN ASSISTANT

## 2021-01-07 PROCEDURE — 74011250636 HC RX REV CODE- 250/636: Performed by: EMERGENCY MEDICINE

## 2021-01-07 PROCEDURE — 74018 RADEX ABDOMEN 1 VIEW: CPT

## 2021-01-07 PROCEDURE — 36415 COLL VENOUS BLD VENIPUNCTURE: CPT

## 2021-01-07 PROCEDURE — 85025 COMPLETE CBC W/AUTO DIFF WBC: CPT

## 2021-01-07 PROCEDURE — 96374 THER/PROPH/DIAG INJ IV PUSH: CPT

## 2021-01-07 RX ORDER — POTASSIUM CHLORIDE 1500 MG/1
40 TABLET, FILM COATED, EXTENDED RELEASE ORAL DAILY
Qty: 6 TAB | Refills: 0 | Status: SHIPPED | OUTPATIENT
Start: 2021-01-07 | End: 2021-01-10

## 2021-01-07 RX ORDER — ONDANSETRON 2 MG/ML
4 INJECTION INTRAMUSCULAR; INTRAVENOUS
Status: COMPLETED | OUTPATIENT
Start: 2021-01-07 | End: 2021-01-07

## 2021-01-07 RX ORDER — ONDANSETRON 4 MG/1
4 TABLET, ORALLY DISINTEGRATING ORAL
Qty: 20 TAB | Refills: 0 | Status: SHIPPED | OUTPATIENT
Start: 2021-01-07 | End: 2021-09-03 | Stop reason: CLARIF

## 2021-01-07 RX ORDER — ONDANSETRON 4 MG/1
8 TABLET, ORALLY DISINTEGRATING ORAL
Status: DISCONTINUED | OUTPATIENT
Start: 2021-01-07 | End: 2021-01-07 | Stop reason: HOSPADM

## 2021-01-07 RX ORDER — POTASSIUM CHLORIDE 750 MG/1
30 TABLET, FILM COATED, EXTENDED RELEASE ORAL
Status: COMPLETED | OUTPATIENT
Start: 2021-01-07 | End: 2021-01-07

## 2021-01-07 RX ADMIN — POTASSIUM CHLORIDE 30 MEQ: 750 TABLET, FILM COATED, EXTENDED RELEASE ORAL at 19:25

## 2021-01-07 RX ADMIN — ONDANSETRON 4 MG: 2 INJECTION INTRAMUSCULAR; INTRAVENOUS at 18:04

## 2021-01-07 RX ADMIN — SODIUM CHLORIDE 1000 ML: 9 INJECTION, SOLUTION INTRAVENOUS at 18:05

## 2021-01-07 NOTE — ED TRIAGE NOTES
Patient arrives via EMS from [facility]. Patient arrives with complaint N/V/D, fatigue, midsternal chest pain worse with coughing. Patient says symptoms began Monday, three days ago, and progressively getting worse. Patient was rapid covid-19 at [facility] with negative result. Patient denies SOB.

## 2021-01-07 NOTE — ED PROVIDER NOTES
27-year-old female with a history of sickle cell anemia, chronic pain, depression, hypertension presents with a chief complaint of vomiting and URI symptoms. The patient reports starting to feel poorly on Monday. She had a negative Covid test at an urgent care facility recently. Today she developed episodes of vomiting and diarrhea. She denies any fevers, chest pain or shortness of breath.            Past Medical History:   Diagnosis Date    Anemia     SC hemoglobinopathy    Chronic pain     Depression     Hypertension     Liver disease     hepatitis C; pt reports \"cured\"    Sickle cell disease (HCC)        Past Surgical History:   Procedure Laterality Date    BREAST SURGERY PROCEDURE UNLISTED      2 cysts removed from R breast    CHEST SURGERY PROCEDURE UNLISTED      status post thor for removal of a benign     HX BREAST BIOPSY Right 05/2007    negative    HX CHOLECYSTECTOMY      HX ORTHOPAEDIC      bony curettage of an ostial myelitis focus         Family History:   Problem Relation Age of Onset    Hypertension Mother     Hypertension Father        Social History     Socioeconomic History    Marital status:      Spouse name: Not on file    Number of children: 2    Years of education: Not on file    Highest education level: Not on file   Occupational History    Occupation: disabled---Sickle cell disease   Social Needs    Financial resource strain: Not on file    Food insecurity     Worry: Not on file     Inability: Not on file   [employer] needs     Medical: Not on file     Non-medical: Not on file   Tobacco Use    Smoking status: Never Smoker    Smokeless tobacco: Never Used   Substance and Sexual Activity    Alcohol use: No    Drug use: No    Sexual activity: Yes     Partners: Male   Lifestyle    Physical activity     Days per week: Not on file     Minutes per session: Not on file    Stress: Not on file   Relationships    Social connections     Talks on phone: Not on file     Gets together: Not on file     Attends Yazidi service: Not on file     Active member of club or organization: Not on file     Attends meetings of clubs or organizations: Not on file     Relationship status: Not on file    Intimate partner violence     Fear of current or ex partner: Not on file     Emotionally abused: Not on file     Physically abused: Not on file     Forced sexual activity: Not on file   Other Topics Concern    Not on file   Social History Narrative    Not on file         ALLERGIES: Dilaudid [hydromorphone (bulk)] and Percocet [oxycodone-acetaminophen]    Review of Systems   Constitutional: Negative for fever. HENT: Negative for rhinorrhea. Respiratory: Negative for shortness of breath. Cardiovascular: Negative for chest pain. Gastrointestinal: Positive for diarrhea and vomiting. Negative for abdominal pain. Genitourinary: Negative for dysuria. Musculoskeletal: Negative for back pain. Skin: Negative for wound. Neurological: Negative for headaches. Psychiatric/Behavioral: Negative for confusion. Vitals:    01/07/21 1549   BP: (!) 173/105   Pulse: 96   Resp: 18   Temp: 98.7 °F (37.1 °C)   SpO2: 100%   Weight: 94.3 kg (208 lb)   Height: 5' 11\" (1.803 m)            Physical Exam  Vitals signs and nursing note reviewed. Constitutional:       General: She is not in acute distress. Appearance: Normal appearance. She is well-developed. She is not ill-appearing, toxic-appearing or diaphoretic. Comments: Actively vomiting   HENT:      Head: Normocephalic and atraumatic. Eyes:      Extraocular Movements: Extraocular movements intact. Neck:      Musculoskeletal: Normal range of motion. Cardiovascular:      Rate and Rhythm: Normal rate and regular rhythm. Pulses: Normal pulses. Heart sounds: Normal heart sounds. Heart sounds not distant. No murmur. No systolic murmur. Pulmonary:      Effort: Pulmonary effort is normal. No respiratory distress. Breath sounds: Normal breath sounds. No wheezing, rhonchi or rales. Abdominal:      General: There is no distension. Palpations: Abdomen is soft. Tenderness: There is no abdominal tenderness. Musculoskeletal: Normal range of motion. Skin:     General: Skin is warm and dry. Neurological:      Mental Status: She is alert and oriented to person, place, and time. Psychiatric:         Mood and Affect: Mood normal.          MDM  Number of Diagnoses or Management Options  Hypokalemia  Vomiting, intractability of vomiting not specified, presence of nausea not specified, unspecified vomiting type  Diagnosis management comments: 82-year-old female presents with vomiting. Differentials include but are not limited to bowel obstruction, electrolyte abnormalities, dehydration among others. Abdominal x-ray was obtained and was unremarkable. The patient's symptoms improved after Zofran administration. She is hypokalemic and her potassium was replaced here in the ED. At this point her vomiting has subsided. The remainder of her labs are unremarkable. I do feel that she is stable for discharge. I recommended that she take potassium for the next few days and Zofran for nausea. She was given return precautions and voiced understanding of these. She is comfortable and agreeable to plan of care and aware of return precautions.          Procedures

## 2021-01-07 NOTE — PROGRESS NOTES
Astrid Streeter was sent here by [facility] for nausea, vomiting and diarrhea. This started Monday, worse today. [facility] performed rapid COVID test and it was negative. Denies known exposure to COVID positive persons. She does endorse some mid sternal CP. This started today. She endorses cough denies SOB. EMS notes pt was hypertensive around 190s/100. Notes hx Sickle Cell anemia. Last hospitalization for crisis in July, 2020.      3:40 PM  I have evaluated the patient as the Provider in Triage. I have reviewed Her vital signs and the triage nurse assessment. I have talked with the patient and any available family and advised that I am the provider in triage and have ordered the appropriate study to initiate their work up based on the clinical presentation during my assessment. I have advised that the patient will be accommodated in the Main ED as soon as possible. I have also requested to contact the triage nurse or myself immediately if the patient experiences any changes in their condition during this brief waiting period.   Desean Coleman PA-C

## 2021-01-07 NOTE — ED NOTES
Ultrasound IV by ED Staff Procedure Note    Patient meets criteria for US IV insertion. Ultrasound IV verbal education provided to patient. Opportunities for questions given. IV ultrasound technique used for PIV placement:  20 gauge BD Nexiva  LAC  location. 1 X Attempt(s). Procedure tolerated well. Primary RN aware of IV placement and added to LDA.                                 Derek Serrano, RN

## 2021-01-08 NOTE — ED NOTES
The patient left the Emergency Department via wheelchair , alert and oriented and in no acute distress. The patient was encouraged to call or return to the ED for worsening issues or problems and was encouraged to schedule a follow up appointment for continuing care. The patient verbalized understanding of discharge instructions and prescriptions, all questions were answered. The patient has no further concerns at this time.

## 2021-01-18 ENCOUNTER — HOSPITAL ENCOUNTER (INPATIENT)
Age: 61
LOS: 11 days | Discharge: HOME | DRG: 812 | End: 2021-01-29
Attending: STUDENT IN AN ORGANIZED HEALTH CARE EDUCATION/TRAINING PROGRAM | Admitting: HOSPITALIST
Payer: MEDICARE

## 2021-01-18 ENCOUNTER — APPOINTMENT (OUTPATIENT)
Dept: GENERAL RADIOLOGY | Age: 61
DRG: 812 | End: 2021-01-18
Attending: HOSPITALIST
Payer: MEDICARE

## 2021-01-18 DIAGNOSIS — D57.00 SICKLE CELL PAIN CRISIS (HCC): Primary | ICD-10-CM

## 2021-01-18 DIAGNOSIS — D57.00 SICKLE CELL CRISIS (HCC): ICD-10-CM

## 2021-01-18 LAB
ALBUMIN SERPL-MCNC: 3.6 G/DL (ref 3.5–5)
ALBUMIN/GLOB SERPL: 0.8 {RATIO} (ref 1.1–2.2)
ALP SERPL-CCNC: 147 U/L (ref 45–117)
ALT SERPL-CCNC: 33 U/L (ref 12–78)
ANION GAP SERPL CALC-SCNC: 3 MMOL/L (ref 5–15)
APPEARANCE UR: CLEAR
AST SERPL-CCNC: 44 U/L (ref 15–37)
BACTERIA URNS QL MICRO: NEGATIVE /HPF
BASOPHILS # BLD: 0.1 K/UL (ref 0–0.1)
BASOPHILS NFR BLD: 1 % (ref 0–1)
BILIRUB SERPL-MCNC: 0.9 MG/DL (ref 0.2–1)
BILIRUB UR QL: NEGATIVE
BUN SERPL-MCNC: 7 MG/DL (ref 6–20)
BUN/CREAT SERPL: 10 (ref 12–20)
CALCIUM SERPL-MCNC: 8.4 MG/DL (ref 8.5–10.1)
CHLORIDE SERPL-SCNC: 102 MMOL/L (ref 97–108)
CK SERPL-CCNC: 57 U/L (ref 26–192)
CO2 SERPL-SCNC: 30 MMOL/L (ref 21–32)
COLOR UR: ABNORMAL
COMMENT, HOLDF: NORMAL
COVID-19 RAPID TEST, COVR: NOT DETECTED
CREAT SERPL-MCNC: 0.68 MG/DL (ref 0.55–1.02)
DIFFERENTIAL METHOD BLD: ABNORMAL
EOSINOPHIL # BLD: 0.1 K/UL (ref 0–0.4)
EOSINOPHIL NFR BLD: 1 % (ref 0–7)
EPITH CASTS URNS QL MICRO: ABNORMAL /LPF
ERYTHROCYTE [DISTWIDTH] IN BLOOD BY AUTOMATED COUNT: 14.5 % (ref 11.5–14.5)
GLOBULIN SER CALC-MCNC: 4.6 G/DL (ref 2–4)
GLUCOSE SERPL-MCNC: 95 MG/DL (ref 65–100)
GLUCOSE UR STRIP.AUTO-MCNC: NEGATIVE MG/DL
HCT VFR BLD AUTO: 24.1 % (ref 35–47)
HEALTH STATUS, XMCV2T: NORMAL
HGB BLD-MCNC: 8.4 G/DL (ref 11.5–16)
HGB UR QL STRIP: NEGATIVE
HYALINE CASTS URNS QL MICRO: ABNORMAL /LPF (ref 0–5)
IMM GRANULOCYTES # BLD AUTO: 0 K/UL (ref 0–0.04)
IMM GRANULOCYTES NFR BLD AUTO: 0 % (ref 0–0.5)
KETONES UR QL STRIP.AUTO: NEGATIVE MG/DL
LEUKOCYTE ESTERASE UR QL STRIP.AUTO: NEGATIVE
LYMPHOCYTES # BLD: 3.9 K/UL (ref 0.8–3.5)
LYMPHOCYTES NFR BLD: 60 % (ref 12–49)
MCH RBC QN AUTO: 40 PG (ref 26–34)
MCHC RBC AUTO-ENTMCNC: 34.9 G/DL (ref 30–36.5)
MCV RBC AUTO: 114.8 FL (ref 80–99)
MONOCYTES # BLD: 0.9 K/UL (ref 0–1)
MONOCYTES NFR BLD: 13 % (ref 5–13)
NEUTS SEG # BLD: 1.7 K/UL (ref 1.8–8)
NEUTS SEG NFR BLD: 25 % (ref 32–75)
NITRITE UR QL STRIP.AUTO: NEGATIVE
NRBC # BLD: 1 K/UL (ref 0–0.01)
NRBC BLD-RTO: 15 PER 100 WBC
PH UR STRIP: 7 [PH] (ref 5–8)
PLATELET # BLD AUTO: 235 K/UL (ref 150–400)
PMV BLD AUTO: 11.1 FL (ref 8.9–12.9)
POTASSIUM SERPL-SCNC: 3.9 MMOL/L (ref 3.5–5.1)
PROT SERPL-MCNC: 8.2 G/DL (ref 6.4–8.2)
PROT UR STRIP-MCNC: NEGATIVE MG/DL
RBC # BLD AUTO: 2.1 M/UL (ref 3.8–5.2)
RBC #/AREA URNS HPF: ABNORMAL /HPF (ref 0–5)
RBC MORPH BLD: ABNORMAL
RETICS # AUTO: 0.08 M/UL (ref 0.02–0.08)
RETICS/RBC NFR AUTO: 4 % (ref 0.7–2.1)
SAMPLES BEING HELD,HOLD: NORMAL
SODIUM SERPL-SCNC: 135 MMOL/L (ref 136–145)
SOURCE, COVRS: NORMAL
SP GR UR REFRACTOMETRY: 1.01 (ref 1–1.03)
SPECIMEN SOURCE, FCOV2M: NORMAL
SPECIMEN TYPE, XMCV1T: NORMAL
UA: UC IF INDICATED,UAUC: ABNORMAL
UROBILINOGEN UR QL STRIP.AUTO: 2 EU/DL (ref 0.2–1)
WBC # BLD AUTO: 6.7 K/UL (ref 3.6–11)
WBC URNS QL MICRO: ABNORMAL /HPF (ref 0–4)

## 2021-01-18 PROCEDURE — 74011000258 HC RX REV CODE- 258: Performed by: HOSPITALIST

## 2021-01-18 PROCEDURE — 87635 SARS-COV-2 COVID-19 AMP PRB: CPT

## 2021-01-18 PROCEDURE — 96376 TX/PRO/DX INJ SAME DRUG ADON: CPT

## 2021-01-18 PROCEDURE — 65660000000 HC RM CCU STEPDOWN

## 2021-01-18 PROCEDURE — 85025 COMPLETE CBC W/AUTO DIFF WBC: CPT

## 2021-01-18 PROCEDURE — 74011250637 HC RX REV CODE- 250/637: Performed by: STUDENT IN AN ORGANIZED HEALTH CARE EDUCATION/TRAINING PROGRAM

## 2021-01-18 PROCEDURE — 99285 EMERGENCY DEPT VISIT HI MDM: CPT

## 2021-01-18 PROCEDURE — 96374 THER/PROPH/DIAG INJ IV PUSH: CPT

## 2021-01-18 PROCEDURE — 36415 COLL VENOUS BLD VENIPUNCTURE: CPT

## 2021-01-18 PROCEDURE — 82550 ASSAY OF CK (CPK): CPT

## 2021-01-18 PROCEDURE — 80053 COMPREHEN METABOLIC PANEL: CPT

## 2021-01-18 PROCEDURE — 81001 URINALYSIS AUTO W/SCOPE: CPT

## 2021-01-18 PROCEDURE — 74011250636 HC RX REV CODE- 250/636: Performed by: STUDENT IN AN ORGANIZED HEALTH CARE EDUCATION/TRAINING PROGRAM

## 2021-01-18 PROCEDURE — 74011250637 HC RX REV CODE- 250/637: Performed by: HOSPITALIST

## 2021-01-18 PROCEDURE — 85045 AUTOMATED RETICULOCYTE COUNT: CPT

## 2021-01-18 PROCEDURE — 96375 TX/PRO/DX INJ NEW DRUG ADDON: CPT

## 2021-01-18 PROCEDURE — 71045 X-RAY EXAM CHEST 1 VIEW: CPT

## 2021-01-18 PROCEDURE — 74011250636 HC RX REV CODE- 250/636: Performed by: HOSPITALIST

## 2021-01-18 RX ORDER — POLYETHYLENE GLYCOL 3350 17 G/17G
17 POWDER, FOR SOLUTION ORAL DAILY PRN
Status: DISCONTINUED | OUTPATIENT
Start: 2021-01-18 | End: 2021-01-29 | Stop reason: HOSPADM

## 2021-01-18 RX ORDER — PANTOPRAZOLE SODIUM 40 MG/1
40 TABLET, DELAYED RELEASE ORAL
Status: DISCONTINUED | OUTPATIENT
Start: 2021-01-18 | End: 2021-01-29 | Stop reason: HOSPADM

## 2021-01-18 RX ORDER — IBUPROFEN 400 MG/1
400 TABLET ORAL
Status: DISCONTINUED | OUTPATIENT
Start: 2021-01-18 | End: 2021-01-19

## 2021-01-18 RX ORDER — FOLIC ACID 1 MG/1
1 TABLET ORAL DAILY
COMMUNITY
End: 2021-02-08 | Stop reason: SDUPTHER

## 2021-01-18 RX ORDER — HYDROMORPHONE HYDROCHLORIDE 1 MG/ML
1 INJECTION, SOLUTION INTRAMUSCULAR; INTRAVENOUS; SUBCUTANEOUS ONCE
Status: COMPLETED | OUTPATIENT
Start: 2021-01-18 | End: 2021-01-18

## 2021-01-18 RX ORDER — DIPHENHYDRAMINE HYDROCHLORIDE 50 MG/ML
12.5 INJECTION, SOLUTION INTRAMUSCULAR; INTRAVENOUS
Status: DISCONTINUED | OUTPATIENT
Start: 2021-01-18 | End: 2021-01-28

## 2021-01-18 RX ORDER — ONDANSETRON 2 MG/ML
4 INJECTION INTRAMUSCULAR; INTRAVENOUS
Status: DISCONTINUED | OUTPATIENT
Start: 2021-01-18 | End: 2021-01-29 | Stop reason: HOSPADM

## 2021-01-18 RX ORDER — ACETAMINOPHEN 650 MG/1
650 SUPPOSITORY RECTAL
Status: DISCONTINUED | OUTPATIENT
Start: 2021-01-18 | End: 2021-01-29 | Stop reason: HOSPADM

## 2021-01-18 RX ORDER — ONDANSETRON 2 MG/ML
4 INJECTION INTRAMUSCULAR; INTRAVENOUS
Status: DISCONTINUED | OUTPATIENT
Start: 2021-01-18 | End: 2021-01-18

## 2021-01-18 RX ORDER — SODIUM CHLORIDE 0.9 % (FLUSH) 0.9 %
5-40 SYRINGE (ML) INJECTION EVERY 8 HOURS
Status: DISCONTINUED | OUTPATIENT
Start: 2021-01-18 | End: 2021-01-29 | Stop reason: HOSPADM

## 2021-01-18 RX ORDER — HYDROMORPHONE HYDROCHLORIDE 1 MG/ML
1 INJECTION, SOLUTION INTRAMUSCULAR; INTRAVENOUS; SUBCUTANEOUS
Status: DISCONTINUED | OUTPATIENT
Start: 2021-01-18 | End: 2021-01-25

## 2021-01-18 RX ORDER — NALOXONE HYDROCHLORIDE 0.4 MG/ML
0.4 INJECTION, SOLUTION INTRAMUSCULAR; INTRAVENOUS; SUBCUTANEOUS AS NEEDED
Status: DISCONTINUED | OUTPATIENT
Start: 2021-01-18 | End: 2021-01-29 | Stop reason: HOSPADM

## 2021-01-18 RX ORDER — SODIUM CHLORIDE 0.9 % (FLUSH) 0.9 %
5-40 SYRINGE (ML) INJECTION AS NEEDED
Status: DISCONTINUED | OUTPATIENT
Start: 2021-01-18 | End: 2021-01-29 | Stop reason: HOSPADM

## 2021-01-18 RX ORDER — ACETAMINOPHEN 325 MG/1
650 TABLET ORAL
Status: DISCONTINUED | OUTPATIENT
Start: 2021-01-18 | End: 2021-01-18

## 2021-01-18 RX ORDER — CITALOPRAM 20 MG/1
40 TABLET, FILM COATED ORAL EVERY EVENING
Status: DISCONTINUED | OUTPATIENT
Start: 2021-01-18 | End: 2021-01-29 | Stop reason: HOSPADM

## 2021-01-18 RX ORDER — DOCUSATE SODIUM 100 MG/1
100 CAPSULE, LIQUID FILLED ORAL 2 TIMES DAILY
Status: DISCONTINUED | OUTPATIENT
Start: 2021-01-18 | End: 2021-01-29 | Stop reason: HOSPADM

## 2021-01-18 RX ORDER — DIPHENHYDRAMINE HYDROCHLORIDE 50 MG/ML
25 INJECTION, SOLUTION INTRAMUSCULAR; INTRAVENOUS
Status: COMPLETED | OUTPATIENT
Start: 2021-01-18 | End: 2021-01-18

## 2021-01-18 RX ORDER — LOSARTAN POTASSIUM 50 MG/1
50 TABLET ORAL
Status: DISCONTINUED | OUTPATIENT
Start: 2021-01-18 | End: 2021-01-29 | Stop reason: HOSPADM

## 2021-01-18 RX ORDER — ENOXAPARIN SODIUM 100 MG/ML
40 INJECTION SUBCUTANEOUS DAILY
Status: DISCONTINUED | OUTPATIENT
Start: 2021-01-19 | End: 2021-01-29 | Stop reason: HOSPADM

## 2021-01-18 RX ORDER — ONDANSETRON 2 MG/ML
4 INJECTION INTRAMUSCULAR; INTRAVENOUS
Status: COMPLETED | OUTPATIENT
Start: 2021-01-18 | End: 2021-01-18

## 2021-01-18 RX ORDER — HYDROXYUREA 500 MG/1
500 CAPSULE ORAL 2 TIMES DAILY
Status: DISCONTINUED | OUTPATIENT
Start: 2021-01-18 | End: 2021-01-29 | Stop reason: HOSPADM

## 2021-01-18 RX ORDER — DEXTROSE MONOHYDRATE AND SODIUM CHLORIDE 5; .45 G/100ML; G/100ML
100 INJECTION, SOLUTION INTRAVENOUS CONTINUOUS
Status: DISCONTINUED | OUTPATIENT
Start: 2021-01-18 | End: 2021-01-23

## 2021-01-18 RX ORDER — PROMETHAZINE HYDROCHLORIDE 25 MG/1
12.5 TABLET ORAL
Status: DISCONTINUED | OUTPATIENT
Start: 2021-01-18 | End: 2021-01-28

## 2021-01-18 RX ORDER — OXYCODONE HYDROCHLORIDE 5 MG/1
5 TABLET ORAL
Status: DISCONTINUED | OUTPATIENT
Start: 2021-01-18 | End: 2021-01-19

## 2021-01-18 RX ORDER — ACETAMINOPHEN 325 MG/1
650 TABLET ORAL
Status: DISCONTINUED | OUTPATIENT
Start: 2021-01-18 | End: 2021-01-29 | Stop reason: HOSPADM

## 2021-01-18 RX ORDER — FOLIC ACID 1 MG/1
1 TABLET ORAL DAILY
Status: DISCONTINUED | OUTPATIENT
Start: 2021-01-19 | End: 2021-01-29 | Stop reason: HOSPADM

## 2021-01-18 RX ADMIN — ONDANSETRON 4 MG: 2 INJECTION INTRAMUSCULAR; INTRAVENOUS at 23:21

## 2021-01-18 RX ADMIN — DIPHENHYDRAMINE HYDROCHLORIDE 25 MG: 50 INJECTION, SOLUTION INTRAMUSCULAR; INTRAVENOUS at 12:57

## 2021-01-18 RX ADMIN — DEXTROSE MONOHYDRATE AND SODIUM CHLORIDE 100 ML/HR: 5; .45 INJECTION, SOLUTION INTRAVENOUS at 17:53

## 2021-01-18 RX ADMIN — HYDROMORPHONE HYDROCHLORIDE 1 MG: 1 INJECTION, SOLUTION INTRAMUSCULAR; INTRAVENOUS; SUBCUTANEOUS at 13:56

## 2021-01-18 RX ADMIN — DOCUSATE SODIUM 100 MG: 100 CAPSULE, LIQUID FILLED ORAL at 17:59

## 2021-01-18 RX ADMIN — HYDROMORPHONE HYDROCHLORIDE 1 MG: 1 INJECTION, SOLUTION INTRAMUSCULAR; INTRAVENOUS; SUBCUTANEOUS at 23:21

## 2021-01-18 RX ADMIN — IBUPROFEN 400 MG: 400 TABLET, FILM COATED ORAL at 22:31

## 2021-01-18 RX ADMIN — OXYCODONE 5 MG: 5 TABLET ORAL at 21:06

## 2021-01-18 RX ADMIN — Medication 10 ML: at 21:07

## 2021-01-18 RX ADMIN — LOSARTAN POTASSIUM 50 MG: 50 TABLET, FILM COATED ORAL at 21:06

## 2021-01-18 RX ADMIN — PANTOPRAZOLE SODIUM 40 MG: 40 TABLET, DELAYED RELEASE ORAL at 17:59

## 2021-01-18 RX ADMIN — HYDROMORPHONE HYDROCHLORIDE 1 MG: 1 INJECTION, SOLUTION INTRAMUSCULAR; INTRAVENOUS; SUBCUTANEOUS at 17:56

## 2021-01-18 RX ADMIN — ONDANSETRON 4 MG: 2 INJECTION INTRAMUSCULAR; INTRAVENOUS at 12:57

## 2021-01-18 RX ADMIN — DIPHENHYDRAMINE HYDROCHLORIDE 12.5 MG: 50 INJECTION, SOLUTION INTRAMUSCULAR; INTRAVENOUS at 23:21

## 2021-01-18 RX ADMIN — HYDROXYUREA 500 MG: 500 CAPSULE ORAL at 18:07

## 2021-01-18 RX ADMIN — HYDROMORPHONE HYDROCHLORIDE 1 MG: 1 INJECTION, SOLUTION INTRAMUSCULAR; INTRAVENOUS; SUBCUTANEOUS at 12:57

## 2021-01-18 RX ADMIN — ONDANSETRON 4 MG: 2 INJECTION INTRAMUSCULAR; INTRAVENOUS at 17:59

## 2021-01-18 RX ADMIN — CITALOPRAM HYDROBROMIDE 40 MG: 20 TABLET ORAL at 18:08

## 2021-01-18 RX ADMIN — Medication 10 ML: at 23:28

## 2021-01-18 RX ADMIN — DIPHENHYDRAMINE HYDROCHLORIDE 12.5 MG: 50 INJECTION, SOLUTION INTRAMUSCULAR; INTRAVENOUS at 17:55

## 2021-01-18 RX ADMIN — SODIUM CHLORIDE 1000 ML: 9 INJECTION, SOLUTION INTRAVENOUS at 12:56

## 2021-01-18 NOTE — H&P
Hospitalist Admission Note    NAME: Severiano Keeler   :  1960   MRN:  [id number]     Date/Time:  2021 5:29 PM    Patient PCP: Ace Verde MD    Please note that this dictation was completed with [employer], the computer voice recognition software. Quite often unanticipated grammatical, syntax, homophones, and other interpretive errors are inadvertently transcribed by the computer software. Please disregard these errors. Please excuse any errors that have escaped final proofreading  ______________________________________________________________________   Assessment & Plan:  Sickle cell pain crisis, POA  --IV Dilaudid 1 mg every 3 hours as needed, needs to take IV Benadryl and Zofran along with Dilaudid due to itching. Narcan as needed for overdose protocol  --Hypotonic IV fluid hydration  --Add Colace and as needed MiraLAX  --Continue hydroxy urea and folic acid. Monitor H&H and transfuse as needed for hemoglobin of less than 7  --Reticulocyte count elevated at 4 and CBC with differential shows 6 gross cells present. --Check CK    Recent URI with NPC, postnasal drainage, vomiting, diarrhea  --sx resolving.  --Rapid COVID-19 test is negative and chest x-ray is clear    Hypertension  --Continue Micardis. Blood pressure currently elevated due to uncontrolled pain. Depression, chronic, mild  --Continue Celexa    GERD  Continue PPI    History of hepatitis C, cured with Harvoni  Body mass index is 29.83 kg/m².     Code: Full code  DVT prophylaxis: Lovenox  Surrogate decision maker: Daughter [facility]    Dr. Patel Ready to assume patient care tomorrow        Subjective:   CHIEF COMPLAINT: Severe pain in bilateral hips, elbows and diffuse myalgias consistent with sickle cell pain crisis    HISTORY OF PRESENT ILLNESS:     Severiano Keeler is a 64 y.o. female past medical history significant for sickle cell disease with anemia, depression, hypertension who presents with complaint noted above.  Has been having 4 days of severe 10 out of 10 pain in bilateral hips, elbows and diffuse body aches. Symptoms are typical of her sickle cell pain. She has been taking Norco 10 mg / 325 mg every 6 hours at home without relief. She believes this pain crisis was triggered by a cold that she develop on January 4 with nonproductive cough, vomiting, diarrhea and sore throat. She reports going to [facility] and had negative Covid testing on January 7. The vomiting and diarrhea have resolved. Continues to have a nonproductive cough and the postnasal drainage has improved. She denies any fevers, chills, loss of appetite, chest pain, abdominal pain. Denies any dysuria or urinary frequency. His last admitted for sickle cell pain crisis in July 2020. We were asked to admit for work up and evaluation of the above problems.      Past Medical History:   Diagnosis Date    Anemia     SC hemoglobinopathy    Chronic pain     Depression     Hypertension     Liver disease     hepatitis C; pt reports \"cured\"    Sickle cell disease ([facility] [address]       Past Surgical History:   Procedure Laterality Date    BREAST SURGERY PROCEDURE UNLISTED      2 cysts removed from R breast    CHEST SURGERY PROCEDURE UNLISTED      status post thor for removal of a benign     HX BREAST BIOPSY Right 05/2007    negative    HX CHOLECYSTECTOMY      HX ORTHOPAEDIC      bony curettage of an ostial myelitis focus     Social History     Tobacco Use    Smoking status: Never Smoker    Smokeless tobacco: Never Used   Substance Use Topics    Alcohol use: No      Drug use: Denies  Lives alone, retired, on disability    Family History   Problem Relation Age of Onset    Hypertension Mother     Hypertension Father      Allergies   Allergen Reactions    Dilaudid [Hydromorphone (Bulk)] Itching     Can tolerate with benadryl and ondansetron    Percocet [Oxycodone-Acetaminophen] Itching        Prior to Admission medications    Medication Sig Start Date End Date Taking? Authorizing Provider   folic acid (FOLVITE) 1 mg tablet Take 1 mg by mouth daily. Yes Provider, Historical   ondansetron (Zofran ODT) 4 mg disintegrating tablet 1 Tab by SubLINGual route every eight (8) hours as needed for Nausea or Vomiting. 1/7/21  Yes Kip Jose MD   HYDROcodone-acetaminophen [facility])  mg tablet Take 1 Tab by mouth every six (6) hours as needed for Pain for up to 30 days. Max Daily Amount: 4 Tabs. DX.D57.00 1/6/21 2/5/21 Yes Geneva Reddy MD   hydroxyurea (HYDREA) 500 mg capsule TAKE 1 CAPSULE BY MOUTH TWICE DAILY 10/31/20  Yes Geneva Reddy MD   telmisartan (MICARDIS) 40 mg tablet Take 1 Tab by mouth nightly. 7/27/20  Yes Geneva Reddy MD   citalopram (CELEXA) 10 mg tablet TAKE 1 TABLET BY MOUTH EVERY NIGHT AT BEDTIME 4/28/20  Yes Geneva Reddy MD   omeprazole (PRILOSEC OTC) 20 mg tablet Take 20 mg by mouth nightly. Yes German, MD Ean   promethazine (PHENERGAN) 25 mg tablet Take 1 Tab by mouth every eight (8) hours as needed for Nausea. 5/26/20   Geneva Reddy MD   hydrocortisone (CORTISONE) 1 % topical cream Apply  to affected area two (2) times daily as needed for Skin Irritation. use thin layer 7/26/19   Geneva Reddy MD     REVIEW OF SYSTEMS:  POSITIVE= Bold.   Negative = normal text  General:  fever, chills, sweats, generalized weakness, weight loss/gain, loss of appetite  Eyes:  blurred vision, eye pain, loss of vision, diplopia  Ear Nose and Throat:  rhinorrhea, pharyngitis  Respiratory:   cough, sputum production, SOB, wheezing, MARS, pleuritic pain  Cardiology:  chest pain, palpitations, orthopnea, PND, edema, syncope   Gastrointestinal:  abdominal pain, N/V, dysphagia, diarrhea, constipation, bleeding  Genitourinary:  frequency, urgency, dysuria, hematuria, incontinence  Muskuloskeletal :  arthralgia, myalgia  Hematology:  easy bruising, bleeding, lymphadenopathy  Dermatological:  rash, ulceration, pruritis  Endocrine:  hot flashes or polydipsia  Neurological:  headache, dizziness, confusion, focal weakness, paresthesia, memory loss, gait disturbance  Psychological: anxiety, depression, agitation      Objective:   VITALS:    Visit Vitals  BP (!) 167/93 (BP 1 Location: Left arm, BP Patient Position: At rest)   Pulse 88   Temp 99.4 °F (37.4 °C)   Resp 17   Ht 5' 11\" (1.803 m)   Wt 97 kg (213 lb 13.5 oz)   SpO2 95%   BMI 29.83 kg/m²     Temp (24hrs), Av.3 °F (37.4 °C), Min:99.2 °F (37.3 °C), Max:99.4 °F (37.4 °C)    Body mass index is 29.83 kg/m². PHYSICAL EXAM:    General:    Alert, overweight, cooperative, no distress, appears stated age. HEENT: Atraumatic, anicteric sclerae, pale conjunctivae     Hearing intact. Neck:  Supple, symmetrical,  thyroid: non tender  Lungs:   Clear to auscultation bilaterally. No Wheezing or Rhonchi. No rales. Chest wall:  No tenderness  No Accessory muscle use. Heart:   Regular  rhythm,  No  murmur   No gallop. No edema. Abdomen:   Soft, non-tender. Not distended. Bowel sounds normal. No masses  Extremities: No cyanosis. No clubbing  Skin:     Not pale Not Jaundiced  No rashes. No swelling, redness in elbows, hips, hands. Psych:  Good insight. Not depressed. Not anxious or agitated. Neurologic: EOMs intact. No facial asymmetry. No aphasia or slurred speech. Symmetrical strength, Alert and oriented X 3.      IMAGING RESULTS:   []       I have personally reviewed the actual   []     CXR  []     CT scan  CXR:  CT :  EKG:   ________________________________________________________________________  Care Plan discussed with:    Comments   Patient y    [facility]:      ________________________________________________________________________  Prophylaxis:  GI PPI   DVT lovenox   ________________________________________________________________________  Recommended Disposition:   Home with Family y   HH/PT/OT/RN    SNF/LTC    JEREMY   ________________________________________________________________________  Code Status:  Full Code y   DNR/DNI    ________________________________________________________________________  TOTAL TIME:  50 minutes      Comments    y Reviewed previous records   >50% of visit spent in counseling and coordination of care  Discussion with patient and/or family and questions answered         ______________________________________________________________________  Lo Santana MD      Procedures: see electronic medical records for all procedures/Xrays and details which were not copied into this note but were reviewed prior to creation of Plan.    LAB DATA REVIEWED:    Recent Results (from the past 24 hour(s))   CBC WITH AUTOMATED DIFF    Collection Time: 01/18/21  9:40 AM   Result Value Ref Range    WBC 6.7 3.6 - 11.0 K/uL    RBC 2.10 (L) 3.80 - 5.20 M/uL    HGB 8.4 (L) 11.5 - 16.0 g/dL    HCT 24.1 (L) 35.0 - 47.0 %    .8 (H) 80.0 - 99.0 FL    MCH 40.0 (H) 26.0 - 34.0 PG    MCHC 34.9 30.0 - 36.5 g/dL    RDW 14.5 11.5 - 14.5 %    PLATELET 235 150 - 400 K/uL    MPV 11.1 8.9 - 12.9 FL    NRBC 15.0 (H) 0  WBC    ABSOLUTE NRBC 1.00 (H) 0.00 - 0.01 K/uL    NEUTROPHILS 25 (L) 32 - 75 %    LYMPHOCYTES 60 (H) 12 - 49 %    MONOCYTES 13 5 - 13 %    EOSINOPHILS 1 0 - 7 %    BASOPHILS 1 0 - 1 %    IMMATURE GRANULOCYTES 0 0.0 - 0.5 %    ABS. NEUTROPHILS 1.7 (L) 1.8 - 8.0 K/UL    ABS. LYMPHOCYTES 3.9 (H) 0.8 - 3.5 K/UL    ABS. MONOCYTES 0.9 0.0 - 1.0 K/UL    ABS. EOSINOPHILS 0.1 0.0 - 0.4 K/UL    ABS. BASOPHILS 0.1 0.0 - 0.1 K/UL    ABS. IMM. GRANS. 0.0 0.00 - 0.04 K/UL    DF SMEAR SCANNED      RBC COMMENTS ANISOCYTOSIS  PRESENT        RBC COMMENTS MACROCYTOSIS  2+        RBC COMMENTS TARGET CELLS  2+        RBC COMMENTS FEW  SICKLE CELLS  PRESENT        RBC COMMENTS POLYCHROMASIA  PRESENT       METABOLIC PANEL, COMPREHENSIVE    Collection Time: 01/18/21  9:40 AM   Result Value Ref Range    Sodium 135 (L) 136 - 145 mmol/L  Potassium 3.9 3.5 - 5.1 mmol/L    Chloride 102 97 - 108 mmol/L    CO2 30 21 - 32 mmol/L    Anion gap 3 (L) 5 - 15 mmol/L    Glucose 95 65 - 100 mg/dL    BUN 7 6 - 20 MG/DL    Creatinine 0.68 0.55 - 1.02 MG/DL    BUN/Creatinine ratio 10 (L) 12 - 20      GFR est AA >60 >60 ml/min/1.73m2    GFR est non-AA >60 >60 ml/min/1.73m2    Calcium 8.4 (L) 8.5 - 10.1 MG/DL    Bilirubin, total 0.9 0.2 - 1.0 MG/DL    ALT (SGPT) 33 12 - 78 U/L    AST (SGOT) 44 (H) 15 - 37 U/L    Alk. phosphatase 147 (H) 45 - 117 U/L    Protein, total 8.2 6.4 - 8.2 g/dL    Albumin 3.6 3.5 - 5.0 g/dL    Globulin 4.6 (H) 2.0 - 4.0 g/dL    A-G Ratio 0.8 (L) 1.1 - 2.2     SAMPLES BEING HELD    Collection Time: 01/18/21  9:40 AM   Result Value Ref Range    SAMPLES BEING HELD  RED, LAV     COMMENT        Add-on orders for these samples will be processed based on acceptable specimen integrity and analyte stability, which may vary by analyte.    RETICULOCYTE COUNT    Collection Time: 01/18/21  9:40 AM   Result Value Ref Range    Reticulocyte count 4.0 (H) 0.7 - 2.1 %    Absolute Retic Cnt. 0.0832 (H) 0.0164 - 0.0776 M/ul   SARS-COV-2    Collection Time: 01/18/21  3:30 PM   Result Value Ref Range    Specimen source Nasopharyngeal      Specimen source NP SWAB     COVID-19 rapid test Not detected NOTD      Specimen type NP Swab      Health status Symptomatic Testing

## 2021-01-18 NOTE — ED NOTES
Pt arrived to ED from home c/o 10/10 bilateral elbow and groin pain X1 week. Pt reports a hx of HTN and sickle cell anemia. Pt is AOX4.

## 2021-01-18 NOTE — ED PROVIDER NOTES
EMERGENCY DEPARTMENT HISTORY AND PHYSICAL EXAM      Date: 1/18/2021  Patient Name: Nakul Grewal    History of Presenting Illness     Chief Complaint   Patient presents with    Sickle Cell Crisis     x 1 week. Pt reports she had a cold with diarrhea and was seen at [facility] and crisis started shortly after. Most pain is in groin and elbows. History Provided By: Patient    HPI: Nakul Grewal, 64 y.o. female with pmhx of sickle cell disease presents to the ED with cc of sickle cell crisis pain x4 days. Patient reports her symptoms began shortly after a recent URI. States that she has been having bilateral groin as well as bilateral elbow pain, which is consistent with her usual sickle cell crisis pain. Denies any swelling in her hip or elbow joints. Denies any loss in range of motion. Denies any fevers or chills. No chest pain or shortness of breath. Patient states that she took her home Norco without any improvement in symptoms. Currently endorses 10 out of 10 pain. Patient states her sickle cell is managed by her PCP, Dr. Papi Garcias. States that last time she was admitted for intractable sickle cell crisis pain was in July 2020. States that symptoms this time are similar. There are no other complaints, changes, or physical findings at this time. PCP: Geneva Reddy MD    No current facility-administered medications on file prior to encounter. Current Outpatient Medications on File Prior to Encounter   Medication Sig Dispense Refill    ondansetron (Zofran ODT) 4 mg disintegrating tablet 1 Tab by SubLINGual route every eight (8) hours as needed for Nausea or Vomiting. 20 Tab 0    HYDROcodone-acetaminophen (NORCO)  mg tablet Take 1 Tab by mouth every six (6) hours as needed for Pain for up to 30 days. Max Daily Amount: 4 Tabs.  DX.D57.00 120 Tab 0    hydroxyurea (HYDREA) 500 mg capsule TAKE 1 CAPSULE BY MOUTH TWICE DAILY 60 Cap 11    telmisartan (MICARDIS) 40 mg tablet Take 1 Tab by mouth nightly. 30 Tab 11    promethazine (PHENERGAN) 25 mg tablet Take 1 Tab by mouth every eight (8) hours as needed for Nausea. 90 Tab 11    citalopram (CELEXA) 10 mg tablet TAKE 1 TABLET BY MOUTH EVERY NIGHT AT BEDTIME 90 Tab 3    hydrocortisone (CORTISONE) 1 % topical cream Apply  to affected area two (2) times daily as needed for Skin Irritation. use thin layer 30 g 11    omeprazole (PRILOSEC OTC) 20 mg tablet Take 20 mg by mouth nightly. Past History     Past Medical History:  Past Medical History:   Diagnosis Date    Anemia     SC hemoglobinopathy    Chronic pain     Depression     Hypertension     Liver disease     hepatitis C; pt reports \"cured\"    Sickle cell disease ([facility] [address]        Past Surgical History:  Past Surgical History:   Procedure Laterality Date    BREAST SURGERY PROCEDURE UNLISTED      2 cysts removed from R breast    CHEST SURGERY PROCEDURE UNLISTED      status post thor for removal of a benign     HX BREAST BIOPSY Right 05/2007    negative    HX CHOLECYSTECTOMY      HX ORTHOPAEDIC      bony curettage of an ostial myelitis focus       Family History:  Family History   Problem Relation Age of Onset    Hypertension Mother     Hypertension Father        Social History:  Social History     Tobacco Use    Smoking status: Never Smoker    Smokeless tobacco: Never Used   Substance Use Topics    Alcohol use: No    Drug use: No       Allergies: Allergies   Allergen Reactions    Dilaudid [Hydromorphone (Bulk)] Itching     Can tolerate with benadryl and ondansetron    Percocet [Oxycodone-Acetaminophen] Itching         Review of Systems   Review of Systems   Constitutional: Negative for chills and fever. HENT: Negative for congestion and rhinorrhea. Eyes: Negative for visual disturbance. Respiratory: Negative for chest tightness and shortness of breath. Cardiovascular: Negative for chest pain and palpitations.    Gastrointestinal: Negative for abdominal pain, diarrhea, nausea and vomiting. Genitourinary: Negative for dysuria, flank pain and hematuria. Musculoskeletal: Negative for back pain and neck pain. Bilateral elbow and bilateral groin pain   Skin: Negative for rash. Allergic/Immunologic: Negative for immunocompromised state. Neurological: Negative for dizziness, speech difficulty, weakness and headaches. Hematological: Negative for adenopathy. Psychiatric/Behavioral: Negative for dysphoric mood and suicidal ideas. Physical Exam   Physical Exam  Vitals signs and nursing note reviewed. Constitutional:       General: She is not in acute distress. Appearance: She is not ill-appearing or toxic-appearing. HENT:      Head: Normocephalic and atraumatic. Nose: Nose normal.      Mouth/Throat:      Mouth: Mucous membranes are moist.   Eyes:      Extraocular Movements: Extraocular movements intact. Pupils: Pupils are equal, round, and reactive to light. Neck:      Musculoskeletal: Normal range of motion and neck supple. Cardiovascular:      Rate and Rhythm: Normal rate and regular rhythm. Pulses: Normal pulses. Pulmonary:      Effort: Pulmonary effort is normal.      Breath sounds: No stridor. No wheezing or rhonchi. Abdominal:      General: Abdomen is flat. There is no distension. Tenderness: There is no abdominal tenderness. Musculoskeletal: Normal range of motion. General: No swelling, tenderness or deformity. Skin:     General: Skin is warm and dry. Neurological:      General: No focal deficit present. Mental Status: She is alert and oriented to person, place, and time.    Psychiatric:         Judgment: Judgment normal.         Diagnostic Study Results     Labs -     Recent Results (from the past 12 hour(s))   CBC WITH AUTOMATED DIFF    Collection Time: 01/18/21  9:40 AM   Result Value Ref Range    WBC 6.7 3.6 - 11.0 K/uL    RBC 2.10 (L) 3.80 - 5.20 M/uL    HGB 8.4 (L) 11.5 - 16.0 g/dL    HCT 24.1 (L) 35.0 - 47.0 %    .8 (H) 80.0 - 99.0 FL    MCH 40.0 (H) 26.0 - 34.0 PG    MCHC 34.9 30.0 - 36.5 g/dL    RDW 14.5 11.5 - 14.5 %    PLATELET 756 401 - 458 K/uL    MPV 11.1 8.9 - 12.9 FL    NRBC 15.0 (H) 0  WBC    ABSOLUTE NRBC 1.00 (H) 0.00 - 0.01 K/uL    NEUTROPHILS 25 (L) 32 - 75 %    LYMPHOCYTES 60 (H) 12 - 49 %    MONOCYTES 13 5 - 13 %    EOSINOPHILS 1 0 - 7 %    BASOPHILS 1 0 - 1 %    IMMATURE GRANULOCYTES 0 0.0 - 0.5 %    ABS. NEUTROPHILS 1.7 (L) 1.8 - 8.0 K/UL    ABS. LYMPHOCYTES 3.9 (H) 0.8 - 3.5 K/UL    ABS. MONOCYTES 0.9 0.0 - 1.0 K/UL    ABS. EOSINOPHILS 0.1 0.0 - 0.4 K/UL    ABS. BASOPHILS 0.1 0.0 - 0.1 K/UL    ABS. IMM. GRANS. 0.0 0.00 - 0.04 K/UL    DF SMEAR SCANNED      RBC COMMENTS ANISOCYTOSIS  PRESENT        RBC COMMENTS MACROCYTOSIS  2+        RBC COMMENTS TARGET CELLS  2+        RBC COMMENTS FEW  SICKLE CELLS  PRESENT        RBC COMMENTS POLYCHROMASIA  PRESENT       METABOLIC PANEL, COMPREHENSIVE    Collection Time: 01/18/21  9:40 AM   Result Value Ref Range    Sodium 135 (L) 136 - 145 mmol/L    Potassium 3.9 3.5 - 5.1 mmol/L    Chloride 102 97 - 108 mmol/L    CO2 30 21 - 32 mmol/L    Anion gap 3 (L) 5 - 15 mmol/L    Glucose 95 65 - 100 mg/dL    BUN 7 6 - 20 MG/DL    Creatinine 0.68 0.55 - 1.02 MG/DL    BUN/Creatinine ratio 10 (L) 12 - 20      GFR est AA >60 >60 ml/min/1.73m2    GFR est non-AA >60 >60 ml/min/1.73m2    Calcium 8.4 (L) 8.5 - 10.1 MG/DL    Bilirubin, total 0.9 0.2 - 1.0 MG/DL    ALT (SGPT) 33 12 - 78 U/L    AST (SGOT) 44 (H) 15 - 37 U/L    Alk.  phosphatase 147 (H) 45 - 117 U/L    Protein, total 8.2 6.4 - 8.2 g/dL    Albumin 3.6 3.5 - 5.0 g/dL    Globulin 4.6 (H) 2.0 - 4.0 g/dL    A-G Ratio 0.8 (L) 1.1 - 2.2     SAMPLES BEING HELD    Collection Time: 01/18/21  9:40 AM   Result Value Ref Range    SAMPLES BEING HELD  RED, LAV     COMMENT        Add-on orders for these samples will be processed based on acceptable specimen integrity and analyte stability, which may vary by analyte. RETICULOCYTE COUNT    Collection Time: 01/18/21  9:40 AM   Result Value Ref Range    Reticulocyte count 4.0 (H) 0.7 - 2.1 %    Absolute Retic Cnt. 0.0832 (H) 0.0164 - 0.0776 M/ul       Radiologic Studies -   No orders to display     CT Results  (Last 48 hours)    None        CXR Results  (Last 48 hours)    None          Medical Decision Making   I am the first provider for this patient. I reviewed the vital signs, available nursing notes, past medical history, past surgical history, family history and social history. Vital Signs-Reviewed the patient's vital signs. Patient Vitals for the past 12 hrs:   Temp Pulse Resp BP SpO2   01/18/21 1139 99.2 °F (37.3 °C) 91 16 (!) 146/96 97 %   01/18/21 0930 99.2 °F (37.3 °C) 99 16  100 %       Records Reviewed: Nursing Notes and Old Medical Records    Provider Notes (Medical Decision Making):   Vitals are stable. On exam, patient is alert oriented x3, in no acute distress. No findings on exam concerning for septic arthritis. No concern for acute chest syndrome. Labs notable for anemia with hemoglobin of 8.4, worsened compared to labs from 11 days prior. Reticulocyte count is elevated at 4. Consistent with sickle cell crisis. We will treat the patient with IV fluids, IV analgesics, and reevaluate. After 2 mg of IV Dilaudid (1mg x 2 given within 1 hour of each other), patient still reporting uncontrolled 9 out of 10 pain. We will admit the patient for further pain management this time. Discussed with the hospitalist, who has accepted her for admission in stable condition. ED Course:   Initial assessment performed. The patients presenting problems have been discussed, and they are in agreement with the care plan formulated and outlined with them. I have encouraged them to ask questions as they arise throughout their visit. Miriam Victor MD      Disposition:  Admit    Diagnosis     Clinical Impression:   1.  Sickle cell pain crisis ([facility] [address] Attestations:    Lopez Guerra MD    Please note that this dictation was completed with [employer], the computer voice recognition software. Quite often unanticipated grammatical, syntax, homophones, and other interpretive errors are inadvertently transcribed by the computer software. Please disregard these errors. Please excuse any errors that have escaped final proofreading. Thank you.

## 2021-01-19 LAB
ALBUMIN SERPL-MCNC: 3.4 G/DL (ref 3.5–5)
ALBUMIN/GLOB SERPL: 0.8 {RATIO} (ref 1.1–2.2)
ALP SERPL-CCNC: 112 U/L (ref 45–117)
ALT SERPL-CCNC: 32 U/L (ref 12–78)
ANION GAP SERPL CALC-SCNC: 4 MMOL/L (ref 5–15)
AST SERPL-CCNC: 59 U/L (ref 15–37)
BILIRUB SERPL-MCNC: 1.3 MG/DL (ref 0.2–1)
BUN SERPL-MCNC: 7 MG/DL (ref 6–20)
BUN/CREAT SERPL: 11 (ref 12–20)
CALCIUM SERPL-MCNC: 8.1 MG/DL (ref 8.5–10.1)
CHLORIDE SERPL-SCNC: 106 MMOL/L (ref 97–108)
CO2 SERPL-SCNC: 28 MMOL/L (ref 21–32)
CREAT SERPL-MCNC: 0.62 MG/DL (ref 0.55–1.02)
ERYTHROCYTE [DISTWIDTH] IN BLOOD BY AUTOMATED COUNT: 14.3 % (ref 11.5–14.5)
GLOBULIN SER CALC-MCNC: 4.1 G/DL (ref 2–4)
GLUCOSE SERPL-MCNC: 77 MG/DL (ref 65–100)
HCT VFR BLD AUTO: 23.4 % (ref 35–47)
HGB BLD-MCNC: 8.1 G/DL (ref 11.5–16)
MCH RBC QN AUTO: 39.3 PG (ref 26–34)
MCHC RBC AUTO-ENTMCNC: 34.6 G/DL (ref 30–36.5)
MCV RBC AUTO: 113.6 FL (ref 80–99)
NRBC # BLD: 0.72 K/UL (ref 0–0.01)
NRBC BLD-RTO: 10.4 PER 100 WBC
PLATELET # BLD AUTO: 215 K/UL (ref 150–400)
PMV BLD AUTO: 10.8 FL (ref 8.9–12.9)
POTASSIUM SERPL-SCNC: 4.3 MMOL/L (ref 3.5–5.1)
PROT SERPL-MCNC: 7.5 G/DL (ref 6.4–8.2)
RBC # BLD AUTO: 2.06 M/UL (ref 3.8–5.2)
SODIUM SERPL-SCNC: 138 MMOL/L (ref 136–145)
WBC # BLD AUTO: 6.9 K/UL (ref 3.6–11)

## 2021-01-19 PROCEDURE — 77030027138 HC INCENT SPIROMETER -A

## 2021-01-19 PROCEDURE — 85027 COMPLETE CBC AUTOMATED: CPT

## 2021-01-19 PROCEDURE — 74011000258 HC RX REV CODE- 258: Performed by: HOSPITALIST

## 2021-01-19 PROCEDURE — 36415 COLL VENOUS BLD VENIPUNCTURE: CPT

## 2021-01-19 PROCEDURE — 74011250636 HC RX REV CODE- 250/636: Performed by: HOSPITALIST

## 2021-01-19 PROCEDURE — 94760 N-INVAS EAR/PLS OXIMETRY 1: CPT

## 2021-01-19 PROCEDURE — 65660000000 HC RM CCU STEPDOWN

## 2021-01-19 PROCEDURE — 74011250637 HC RX REV CODE- 250/637: Performed by: HOSPITALIST

## 2021-01-19 PROCEDURE — 80053 COMPREHEN METABOLIC PANEL: CPT

## 2021-01-19 PROCEDURE — 74011250637 HC RX REV CODE- 250/637: Performed by: INTERNAL MEDICINE

## 2021-01-19 PROCEDURE — 94762 N-INVAS EAR/PLS OXIMTRY CONT: CPT

## 2021-01-19 RX ORDER — FENTANYL 75 UG/H
1 PATCH TRANSDERMAL
Status: DISCONTINUED | OUTPATIENT
Start: 2021-01-19 | End: 2021-01-29 | Stop reason: HOSPADM

## 2021-01-19 RX ADMIN — DIPHENHYDRAMINE HYDROCHLORIDE 12.5 MG: 50 INJECTION, SOLUTION INTRAMUSCULAR; INTRAVENOUS at 09:04

## 2021-01-19 RX ADMIN — ENOXAPARIN SODIUM 40 MG: 40 INJECTION SUBCUTANEOUS at 08:42

## 2021-01-19 RX ADMIN — FOLIC ACID 1 MG: 1 TABLET ORAL at 08:42

## 2021-01-19 RX ADMIN — HYDROMORPHONE HYDROCHLORIDE 1 MG: 1 INJECTION, SOLUTION INTRAMUSCULAR; INTRAVENOUS; SUBCUTANEOUS at 02:01

## 2021-01-19 RX ADMIN — DOCUSATE SODIUM 100 MG: 100 CAPSULE, LIQUID FILLED ORAL at 08:42

## 2021-01-19 RX ADMIN — HYDROXYUREA 500 MG: 500 CAPSULE ORAL at 21:32

## 2021-01-19 RX ADMIN — HYDROMORPHONE HYDROCHLORIDE 1 MG: 1 INJECTION, SOLUTION INTRAMUSCULAR; INTRAVENOUS; SUBCUTANEOUS at 05:10

## 2021-01-19 RX ADMIN — Medication 10 ML: at 13:09

## 2021-01-19 RX ADMIN — ONDANSETRON 4 MG: 2 INJECTION INTRAMUSCULAR; INTRAVENOUS at 11:58

## 2021-01-19 RX ADMIN — Medication 10 ML: at 05:14

## 2021-01-19 RX ADMIN — Medication 10 ML: at 21:45

## 2021-01-19 RX ADMIN — ONDANSETRON 4 MG: 2 INJECTION INTRAMUSCULAR; INTRAVENOUS at 05:10

## 2021-01-19 RX ADMIN — Medication 10 ML: at 09:03

## 2021-01-19 RX ADMIN — DIPHENHYDRAMINE HYDROCHLORIDE 12.5 MG: 50 INJECTION, SOLUTION INTRAMUSCULAR; INTRAVENOUS at 13:07

## 2021-01-19 RX ADMIN — PANTOPRAZOLE SODIUM 40 MG: 40 TABLET, DELAYED RELEASE ORAL at 16:44

## 2021-01-19 RX ADMIN — HYDROMORPHONE HYDROCHLORIDE 1 MG: 1 INJECTION, SOLUTION INTRAMUSCULAR; INTRAVENOUS; SUBCUTANEOUS at 08:43

## 2021-01-19 RX ADMIN — DIPHENHYDRAMINE HYDROCHLORIDE 12.5 MG: 50 INJECTION, SOLUTION INTRAMUSCULAR; INTRAVENOUS at 18:16

## 2021-01-19 RX ADMIN — ONDANSETRON 4 MG: 2 INJECTION INTRAMUSCULAR; INTRAVENOUS at 18:17

## 2021-01-19 RX ADMIN — POLYETHYLENE GLYCOL 3350 17 G: 17 POWDER, FOR SOLUTION ORAL at 08:43

## 2021-01-19 RX ADMIN — HYDROMORPHONE HYDROCHLORIDE 1 MG: 1 INJECTION, SOLUTION INTRAMUSCULAR; INTRAVENOUS; SUBCUTANEOUS at 21:18

## 2021-01-19 RX ADMIN — DIPHENHYDRAMINE HYDROCHLORIDE 12.5 MG: 50 INJECTION, SOLUTION INTRAMUSCULAR; INTRAVENOUS at 21:18

## 2021-01-19 RX ADMIN — HYDROMORPHONE HYDROCHLORIDE 1 MG: 1 INJECTION, SOLUTION INTRAMUSCULAR; INTRAVENOUS; SUBCUTANEOUS at 15:18

## 2021-01-19 RX ADMIN — HYDROMORPHONE HYDROCHLORIDE 1 MG: 1 INJECTION, SOLUTION INTRAMUSCULAR; INTRAVENOUS; SUBCUTANEOUS at 11:56

## 2021-01-19 RX ADMIN — Medication 10 ML: at 08:46

## 2021-01-19 RX ADMIN — DEXTROSE MONOHYDRATE AND SODIUM CHLORIDE 100 ML/HR: 5; .45 INJECTION, SOLUTION INTRAVENOUS at 18:41

## 2021-01-19 RX ADMIN — DIPHENHYDRAMINE HYDROCHLORIDE 12.5 MG: 50 INJECTION, SOLUTION INTRAMUSCULAR; INTRAVENOUS at 02:02

## 2021-01-19 RX ADMIN — DIPHENHYDRAMINE HYDROCHLORIDE 12.5 MG: 50 INJECTION, SOLUTION INTRAMUSCULAR; INTRAVENOUS at 05:10

## 2021-01-19 RX ADMIN — DOCUSATE SODIUM 100 MG: 100 CAPSULE, LIQUID FILLED ORAL at 18:15

## 2021-01-19 RX ADMIN — CITALOPRAM HYDROBROMIDE 40 MG: 20 TABLET ORAL at 18:15

## 2021-01-19 RX ADMIN — LOSARTAN POTASSIUM 50 MG: 50 TABLET, FILM COATED ORAL at 21:45

## 2021-01-19 RX ADMIN — HYDROXYUREA 500 MG: 500 CAPSULE ORAL at 08:58

## 2021-01-19 RX ADMIN — HYDROMORPHONE HYDROCHLORIDE 1 MG: 1 INJECTION, SOLUTION INTRAMUSCULAR; INTRAVENOUS; SUBCUTANEOUS at 18:19

## 2021-01-19 NOTE — PROGRESS NOTES
End of Shift Note    Bedside shift change report given to  (oncoming nurse) by Karin Malik RN (offgoing nurse). Report included the following information SBAR, Intake/Output, MAR and Accordion    Shift worked:  Night shift   Shift summary and any significant changes:     No New onset       Concerns for physician to address:  No new onset   Zone phone for oncoming shift:        Patient Information  Franki Mccartney  64 y.o.  1/18/2021 11:21 AM by Brooklyn Boykin MD. Franki Mccartney was admitted from Home    Problem List  Patient Active Problem List    Diagnosis Date Noted    AVN of femur ([facility] [address] 01/25/2020     Priority: 3 - Three    Hypokalemia 01/25/2020     Priority: 3 - Three    Viral gastritis 07/06/2020    Dehydration 07/06/2020    UTI (urinary tract infection) 07/04/2020    Acute gastroenteritis 10/27/2019    Class 1 obesity due to excess calories with serious comorbidity and body mass index (BMI) of 30.0 to 30.9 in adult 08/13/2019    Sickle cell disease ([facility] [address] 02/10/2019    Overweight 02/07/2019    Sickle cell pain crisis ([facility] [address] 09/08/2018    Carpal tunnel syndrome of right wrist 06/12/2018    Pulsatile tinnitus 10/31/2017    Sickle cell crisis ([facility] [address] 05/22/2017    Depression 10/05/2014    Hypertension 10/03/2014    Venous insufficiency 10/03/2014     Past Medical History:   Diagnosis Date    Anemia     SC hemoglobinopathy    Chronic pain     Depression     Hypertension     Liver disease     hepatitis C; pt reports \"cured\"    Sickle cell disease ([facility] [address]        Core Measures:  CVA: No No  CHF:No No  PNA:No No    Activity:  Activity Level: Ambulate X (#)  Number times ambulated in hallways past shift: 0  Number of times OOB to chair past shift: 0    Cardiac:   Cardiac Monitoring: Yes      Cardiac Rhythm: Normal sinus rhythm    Access:   Current line(s): PIV   Central Line? No Placement date  Reason Medically Necessary   PICC LINE?  No Placement date Reason Medically Necessary     Genitourinary: Urinary status: voiding  Urinary Catheter? No Placement Date  Reason Medically Necessary     Respiratory:   O2 Device: Room air  Chronic home O2 use?: YES  Incentive spirometer at bedside: NO       GI:  Last Bowel Movement Date: 01/18/21  Current diet:  DIET CARDIAC Regular  Passing flatus: YES  Tolerating current diet: YES       Pain Management:   Patient states pain is manageable on current regimen: YES    Skin:  Nuno Score: 22  Interventions: speciality bed and increase time out of bed    Patient Safety:  Fall Score: Total Score: 0  Interventions: bed/chair alarm and pt to call before getting OOB       DVT prophylaxis:  DVT prophylaxis Med- No  DVT prophylaxis SCD or SARA- No     Wounds: (If Applicable)  Wounds- No  Location     Active Consults:  IP CONSULT TO HOSPITALIST    Length of Stay:  Expected LOS: - - -  Actual LOS: 1  Discharge Plan: No   Pt continues to c/o generalized pain, meds administered as ordered.  She denies chest pain or SOB, no distress noted      Alena Adams RN

## 2021-01-19 NOTE — PROGRESS NOTES
General Daily Progress Note    Admit Date: 1/18/2021    Subjective:     Patient complains of generalized pain. .     Current Facility-Administered Medications   Medication Dose Route Frequency    fentaNYL (DURAGESIC) 75 mcg/hr patch 1 Patch  1 Patch TransDERmal Q72H    sodium chloride (NS) flush 5-40 mL  5-40 mL IntraVENous Q8H    sodium chloride (NS) flush 5-40 mL  5-40 mL IntraVENous PRN    acetaminophen (TYLENOL) tablet 650 mg  650 mg Oral Q6H PRN    Or    acetaminophen (TYLENOL) suppository 650 mg  650 mg Rectal Q6H PRN    polyethylene glycol (MIRALAX) packet 17 g  17 g Oral DAILY PRN    promethazine (PHENERGAN) tablet 12.5 mg  12.5 mg Oral Q6H PRN    Or    ondansetron (ZOFRAN) injection 4 mg  4 mg IntraVENous Q6H PRN    enoxaparin (LOVENOX) injection 40 mg  40 mg SubCUTAneous DAILY    dextrose 5 % - 0.45% NaCl infusion  100 mL/hr IntraVENous CONTINUOUS    docusate sodium (COLACE) capsule 100 mg  100 mg Oral BID    HYDROmorphone (PF) (DILAUDID) injection 1 mg  1 mg IntraVENous Q3H PRN    diphenhydrAMINE (BENADRYL) injection 12.5 mg  12.5 mg IntraVENous K2S PRN    folic acid (FOLVITE) tablet 1 mg  1 mg Oral DAILY    citalopram (CELEXA) tablet 40 mg  40 mg Oral QPM    hydroxyurea (HYDREA) chemo cap 500 mg  500 mg Oral BID    pantoprazole (PROTONIX) tablet 40 mg  40 mg Oral ACD    losartan (COZAAR) tablet 50 mg  50 mg Oral QHS    naloxone (NARCAN) injection 0.4 mg  0.4 mg IntraVENous PRN        Review of Systems  A comprehensive review of systems was negative.     Objective:     Patient Vitals for the past 24 hrs:   BP Temp Pulse Resp SpO2 Height Weight   01/19/21 0817 130/70 98.5 °F (36.9 °C) 80 18 99 %     01/19/21 0245 (!) 183/95 97.8 °F (36.6 °C) 86 18 90 %     01/18/21 2311 (!) 169/99 99 °F (37.2 °C)        01/18/21 2300     95 %     01/18/21 2106 138/70  89       01/18/21 2045 134/76  89 25 94 %     01/18/21 2000 (!) 157/92  87 14 95 %     01/18/21 1900 (!) 152/95  87 17 94 %     01/18/21 1811 (!) 169/99 99 °F (37.2 °C) 88 19 96 %     01/18/21 1807  99 °F (37.2 °C)        01/18/21 1356 (!) 167/93 99.4 °F (37.4 °C) 88 17 95 %     01/18/21 1318 (!) 153/81 99.4 °F (37.4 °C) 94 24 100 %     01/18/21 1139 (!) 146/96 99.2 °F (37.3 °C) 91 16 97 %     01/18/21 0930  99.2 °F (37.3 °C) 99 16 100 % 5' 11\" (1.803 m) 213 lb 13.5 oz (97 kg)     01/19 0701 - 01/19 1900  In: 150 [P.O.:150]  Out: -   No intake/output data recorded. Physical Exam:   Visit Vitals  /70 (BP 1 Location: Left arm, BP Patient Position: Supine; At rest)   Pulse 80   Temp 98.5 °F (36.9 °C)   Resp 18   Ht 5' 11\" (1.803 m)   Wt 213 lb 13.5 oz (97 kg)   SpO2 99%   Breastfeeding No   BMI 29.83 kg/m²     General appearance: alert, cooperative, no distress, appears stated age  Neck: supple, symmetrical, trachea midline, no adenopathy, thyroid: not enlarged, symmetric, no tenderness/mass/nodules, no carotid bruit and no JVD  Lungs: clear to auscultation bilaterally  Heart: regular rate and rhythm, S1, S2 normal, no murmur, click, rub or gallop  Abdomen: soft, non-tender. Bowel sounds normal. No masses,  no organomegaly  Extremities: extremities normal, atraumatic, no cyanosis or edema    Assessment:     Active Problems:    * No active hospital problems. *      Plan:     1. Patient admitted for painful crises. Continue treatment regimen. 2.  Covid test done 1 week ago and was negative.

## 2021-01-19 NOTE — ED NOTES
TRANSFER - OUT REPORT:    Verbal report given to RN(name) on Ricci Murillo  being transferred to Neuro(unit) for routine progression of care       Report consisted of patients Situation, Background, Assessment and   Recommendations(SBAR). Information from the following report(s) SBAR, Kardex, Intake/Output and MAR was reviewed with the receiving nurse. Lines:   Peripheral IV 01/18/21 Right Antecubital (Active)        Opportunity for questions and clarification was provided.

## 2021-01-20 LAB
ALBUMIN SERPL-MCNC: 3.2 G/DL (ref 3.5–5)
ALBUMIN/GLOB SERPL: 0.8 {RATIO} (ref 1.1–2.2)
ALP SERPL-CCNC: 112 U/L (ref 45–117)
ALT SERPL-CCNC: 31 U/L (ref 12–78)
ANION GAP SERPL CALC-SCNC: 4 MMOL/L (ref 5–15)
AST SERPL-CCNC: 46 U/L (ref 15–37)
BASOPHILS # BLD: 0 K/UL (ref 0–0.1)
BASOPHILS NFR BLD: 0 % (ref 0–1)
BILIRUB SERPL-MCNC: 0.9 MG/DL (ref 0.2–1)
BLASTS NFR BLD MANUAL: 0 %
BUN SERPL-MCNC: 8 MG/DL (ref 6–20)
BUN/CREAT SERPL: 13 (ref 12–20)
CALCIUM SERPL-MCNC: 8 MG/DL (ref 8.5–10.1)
CHLORIDE SERPL-SCNC: 104 MMOL/L (ref 97–108)
CO2 SERPL-SCNC: 28 MMOL/L (ref 21–32)
CREAT SERPL-MCNC: 0.63 MG/DL (ref 0.55–1.02)
DIFFERENTIAL METHOD BLD: ABNORMAL
EOSINOPHIL # BLD: 0.1 K/UL (ref 0–0.4)
EOSINOPHIL NFR BLD: 2 % (ref 0–7)
ERYTHROCYTE [DISTWIDTH] IN BLOOD BY AUTOMATED COUNT: 14.5 % (ref 11.5–14.5)
GLOBULIN SER CALC-MCNC: 4.1 G/DL (ref 2–4)
GLUCOSE SERPL-MCNC: 74 MG/DL (ref 65–100)
HCT VFR BLD AUTO: 23.4 % (ref 35–47)
HGB BLD-MCNC: 8.3 G/DL (ref 11.5–16)
IMM GRANULOCYTES # BLD AUTO: 0 K/UL
IMM GRANULOCYTES NFR BLD AUTO: 0 %
LYMPHOCYTES # BLD: 4.4 K/UL (ref 0.8–3.5)
LYMPHOCYTES NFR BLD: 66 % (ref 12–49)
MCH RBC QN AUTO: 40.1 PG (ref 26–34)
MCHC RBC AUTO-ENTMCNC: 35.5 G/DL (ref 30–36.5)
MCV RBC AUTO: 113 FL (ref 80–99)
METAMYELOCYTES NFR BLD MANUAL: 0 %
MONOCYTES # BLD: 0.5 K/UL (ref 0–1)
MONOCYTES NFR BLD: 7 % (ref 5–13)
MYELOCYTES NFR BLD MANUAL: 0 %
NEUTS BAND NFR BLD MANUAL: 0 % (ref 0–6)
NEUTS SEG # BLD: 1.7 K/UL (ref 1.8–8)
NEUTS SEG NFR BLD: 25 % (ref 32–75)
NRBC # BLD: 0.73 K/UL (ref 0–0.01)
NRBC BLD-RTO: 10.9 PER 100 WBC
OTHER CELLS NFR BLD MANUAL: 0 %
PLATELET # BLD AUTO: 190 K/UL (ref 150–400)
PMV BLD AUTO: 11.1 FL (ref 8.9–12.9)
POTASSIUM SERPL-SCNC: 4.2 MMOL/L (ref 3.5–5.1)
PROMYELOCYTES NFR BLD MANUAL: 0 %
PROT SERPL-MCNC: 7.3 G/DL (ref 6.4–8.2)
RBC # BLD AUTO: 2.07 M/UL (ref 3.8–5.2)
RBC MORPH BLD: ABNORMAL
SODIUM SERPL-SCNC: 136 MMOL/L (ref 136–145)
WBC # BLD AUTO: 6.7 K/UL (ref 3.6–11)

## 2021-01-20 PROCEDURE — 74011250636 HC RX REV CODE- 250/636: Performed by: HOSPITALIST

## 2021-01-20 PROCEDURE — 94760 N-INVAS EAR/PLS OXIMETRY 1: CPT

## 2021-01-20 PROCEDURE — 36415 COLL VENOUS BLD VENIPUNCTURE: CPT

## 2021-01-20 PROCEDURE — 65660000000 HC RM CCU STEPDOWN

## 2021-01-20 PROCEDURE — 80053 COMPREHEN METABOLIC PANEL: CPT

## 2021-01-20 PROCEDURE — 74011250637 HC RX REV CODE- 250/637: Performed by: HOSPITALIST

## 2021-01-20 PROCEDURE — 85027 COMPLETE CBC AUTOMATED: CPT

## 2021-01-20 PROCEDURE — 74011000258 HC RX REV CODE- 258: Performed by: HOSPITALIST

## 2021-01-20 RX ADMIN — DOCUSATE SODIUM 100 MG: 100 CAPSULE, LIQUID FILLED ORAL at 17:40

## 2021-01-20 RX ADMIN — ENOXAPARIN SODIUM 40 MG: 40 INJECTION SUBCUTANEOUS at 09:29

## 2021-01-20 RX ADMIN — HYDROMORPHONE HYDROCHLORIDE 1 MG: 1 INJECTION, SOLUTION INTRAMUSCULAR; INTRAVENOUS; SUBCUTANEOUS at 16:21

## 2021-01-20 RX ADMIN — DIPHENHYDRAMINE HYDROCHLORIDE 12.5 MG: 50 INJECTION, SOLUTION INTRAMUSCULAR; INTRAVENOUS at 16:20

## 2021-01-20 RX ADMIN — DEXTROSE MONOHYDRATE AND SODIUM CHLORIDE 100 ML/HR: 5; .45 INJECTION, SOLUTION INTRAVENOUS at 06:27

## 2021-01-20 RX ADMIN — ONDANSETRON 4 MG: 2 INJECTION INTRAMUSCULAR; INTRAVENOUS at 00:12

## 2021-01-20 RX ADMIN — ONDANSETRON 4 MG: 2 INJECTION INTRAMUSCULAR; INTRAVENOUS at 06:27

## 2021-01-20 RX ADMIN — ONDANSETRON 4 MG: 2 INJECTION INTRAMUSCULAR; INTRAVENOUS at 19:23

## 2021-01-20 RX ADMIN — HYDROMORPHONE HYDROCHLORIDE 1 MG: 1 INJECTION, SOLUTION INTRAMUSCULAR; INTRAVENOUS; SUBCUTANEOUS at 06:27

## 2021-01-20 RX ADMIN — HYDROXYUREA 500 MG: 500 CAPSULE ORAL at 17:40

## 2021-01-20 RX ADMIN — HYDROMORPHONE HYDROCHLORIDE 1 MG: 1 INJECTION, SOLUTION INTRAMUSCULAR; INTRAVENOUS; SUBCUTANEOUS at 00:12

## 2021-01-20 RX ADMIN — DIPHENHYDRAMINE HYDROCHLORIDE 12.5 MG: 50 INJECTION, SOLUTION INTRAMUSCULAR; INTRAVENOUS at 03:18

## 2021-01-20 RX ADMIN — Medication 10 ML: at 06:29

## 2021-01-20 RX ADMIN — ONDANSETRON 4 MG: 2 INJECTION INTRAMUSCULAR; INTRAVENOUS at 12:30

## 2021-01-20 RX ADMIN — HYDROXYUREA 500 MG: 500 CAPSULE ORAL at 09:29

## 2021-01-20 RX ADMIN — HYDROMORPHONE HYDROCHLORIDE 1 MG: 1 INJECTION, SOLUTION INTRAMUSCULAR; INTRAVENOUS; SUBCUTANEOUS at 22:03

## 2021-01-20 RX ADMIN — LOSARTAN POTASSIUM 50 MG: 50 TABLET, FILM COATED ORAL at 22:00

## 2021-01-20 RX ADMIN — Medication 10 ML: at 22:00

## 2021-01-20 RX ADMIN — HYDROMORPHONE HYDROCHLORIDE 1 MG: 1 INJECTION, SOLUTION INTRAMUSCULAR; INTRAVENOUS; SUBCUTANEOUS at 12:30

## 2021-01-20 RX ADMIN — PANTOPRAZOLE SODIUM 40 MG: 40 TABLET, DELAYED RELEASE ORAL at 16:23

## 2021-01-20 RX ADMIN — HYDROMORPHONE HYDROCHLORIDE 1 MG: 1 INJECTION, SOLUTION INTRAMUSCULAR; INTRAVENOUS; SUBCUTANEOUS at 03:18

## 2021-01-20 RX ADMIN — DIPHENHYDRAMINE HYDROCHLORIDE 12.5 MG: 50 INJECTION, SOLUTION INTRAMUSCULAR; INTRAVENOUS at 22:01

## 2021-01-20 RX ADMIN — CITALOPRAM HYDROBROMIDE 40 MG: 20 TABLET ORAL at 17:40

## 2021-01-20 RX ADMIN — HYDROMORPHONE HYDROCHLORIDE 1 MG: 1 INJECTION, SOLUTION INTRAMUSCULAR; INTRAVENOUS; SUBCUTANEOUS at 09:29

## 2021-01-20 RX ADMIN — HYDROMORPHONE HYDROCHLORIDE 1 MG: 1 INJECTION, SOLUTION INTRAMUSCULAR; INTRAVENOUS; SUBCUTANEOUS at 19:23

## 2021-01-20 RX ADMIN — DOCUSATE SODIUM 100 MG: 100 CAPSULE, LIQUID FILLED ORAL at 09:29

## 2021-01-20 RX ADMIN — DIPHENHYDRAMINE HYDROCHLORIDE 12.5 MG: 50 INJECTION, SOLUTION INTRAMUSCULAR; INTRAVENOUS at 09:30

## 2021-01-20 RX ADMIN — FOLIC ACID 1 MG: 1 TABLET ORAL at 09:29

## 2021-01-20 NOTE — PROGRESS NOTES
Transition of Care Plan    *Disposition: Home with family support  *Transport at D/C: Brother or friend  *DME: No identified DME needs at this time  *OP F/U: PCP  *IMM letter      Reason for Admission:  Sickle cell crisis                   RUR Score:   20%               PCP: First and Last name:  Dr. Brigido Hinojosa   Name of Practice: [address]    Are you a current patient: Yes/No: Yes   Approximate date of last visit: 7/20/20   Can you participate in a virtual visit if needed: Yes    Do you (patient/family) have any concerns for transition/discharge? No concerns reported. Plan for utilizing home health:  No HHC needs identified at this time. Current Advanced Directive/Advance Care Plan:  Full code. No ACP documents on file. See below. Transition of Care Plan:  Home with OP f/u     CM spoke with patient via bedside telephone to complete initial assessment due to contact isolation precautions. CM introduced role, verified demographics, and discussed discharge planning. 65 yo female admitted with sickle cell crisis. PMHx significant for chronic pain, HTN, hepatitis C, depression, etc. Pt reports to be active with her PCP, Dr. Juma Koch, who she has seen since she was 26 yo. Dr. Juma Koch also provides pain management for her chronic pain. Pt reports to be independent with ADL/IADLs to include driving. Pt declined mobility/ambulation concerns and reports no DME use. No hx of HH, SNF, IPR. Primary supports include her daughter, son, and brother. Pt is Insured with Medicare A&B and Blue Cross supplement. Preferred pharmacy is [facility] in [place]. Pt states that either her brother or a friend will transport her home at d/c. Unit CM will continue to follow for d/c planning needs. Care Management Interventions  PCP Verified by CM: Yes(Dr. Brigido Hinojosa)  Last Visit to PCP: 07/20/20  Mode of Transport at Discharge:  Other (see comment)(brother or friend)  Transition of Care Consult (CM Consult): Discharge Planning(anticipate home with OP f/u)  Discharge Durable Medical Equipment: No(no reported DME)  Physical Therapy Consult: No  Occupational Therapy Consult: No  Speech Therapy Consult: No  Current Support Network: Family Lives Nearby(lives independently - brother, daughter, son are supportive)  Confirm Follow Up Transport: Self  Discharge Location  Discharge Placement: Home with outpatient services      Laura Nickerson (ACP) Conversation      Date of Conversation: 1/20/2021  Conducted with: Patient with Decision Making Capacity    Healthcare Decision Maker: Today we documented Decision Maker(s) consistent with Legal Next of Kin hierarchy.     Content/Action Overview:   Has NO ACP documents/care preferences - information provided, considering goals and options  Reviewed DNR/DNI and patient elects Full Code (Attempt Resuscitation)    Length of Voluntary ACP Conversation in minutes:  <16 minutes (Non-Billable)    Felice Lawton [id number]  [phone]

## 2021-01-20 NOTE — PROGRESS NOTES
End of Shift Note    Bedside shift change report given to  (oncoming nurse) by Celestina Almanza RN (offgoing nurse). Report included the following information SBAR, Intake/Output, MAR and Accordion    Shift worked:  Night shift   Shift summary and any significant changes:     No New onset       Concerns for physician to address:  No new onset   Zone phone for oncoming shift:        Patient Information  Alex Nunez  64 y.o.  1/18/2021 11:21 AM by Veto Vogel MD. Alex Nunez was admitted from Home    Problem List  Patient Active Problem List    Diagnosis Date Noted    AVN of femur ([facility] [address] 01/25/2020     Priority: 3 - Three    Hypokalemia 01/25/2020     Priority: 3 - Three    Viral gastritis 07/06/2020    Dehydration 07/06/2020    UTI (urinary tract infection) 07/04/2020    Acute gastroenteritis 10/27/2019    Class 1 obesity due to excess calories with serious comorbidity and body mass index (BMI) of 30.0 to 30.9 in adult 08/13/2019    Sickle cell disease ([facility] [address] 02/10/2019    Overweight 02/07/2019    Sickle cell pain crisis ([facility] [address] 09/08/2018    Carpal tunnel syndrome of right wrist 06/12/2018    Pulsatile tinnitus 10/31/2017    Sickle cell crisis ([facility] [address] 05/22/2017    Depression 10/05/2014    Hypertension 10/03/2014    Venous insufficiency 10/03/2014     Past Medical History:   Diagnosis Date    Anemia     SC hemoglobinopathy    Chronic pain     Depression     Hypertension     Liver disease     hepatitis C; pt reports \"cured\"    Sickle cell disease ([facility] [address]        Core Measures:  CVA: No No  CHF:No No  PNA:No No    Activity:  Activity Level: Up ad erum  Number times ambulated in hallways past shift: 0  Number of times OOB to chair past shift: 0    Cardiac:   Cardiac Monitoring: Yes      Cardiac Rhythm: Normal sinus rhythm    Access:   Current line(s): PIV   Central Line? No Placement date  Reason Medically Necessary   PICC LINE?  No Placement date Reason Medically Necessary     Genitourinary: Urinary status: voiding  Urinary Catheter? No Placement Date  Reason Medically Necessary     Respiratory:   O2 Device: Room air  Chronic home O2 use?: YES  Incentive spirometer at bedside: NO  Actual Volume (ml): 1000 ml    GI:  Last Bowel Movement Date: 01/19/21  Current diet:  DIET REGULAR  Passing flatus: YES  Tolerating current diet: YES  % Diet Eaten: 90 %    Pain Management:   Patient states pain is manageable on current regimen: YES    Skin:  Nuno Score: 23  Interventions: speciality bed and increase time out of bed    Patient Safety:  Fall Score: Total Score: 1  Interventions: bed/chair alarm and pt to call before getting OOB       DVT prophylaxis:  DVT prophylaxis Med- No  DVT prophylaxis SCD or SARA- No     Wounds: (If Applicable)  Wounds- No  Location     Active Consults:  IP CONSULT TO HOSPITALIST    Length of Stay:  Expected LOS: 2d 16h  Actual LOS: 2  Discharge Plan: No   Pt continues to c/o generalized pain, meds administered as ordered. She denies chest pain or SOB, is connected to O2 reader and telemetry.        Taniya Andre RN

## 2021-01-20 NOTE — PROGRESS NOTES
General Daily Progress Note    Admit Date: 1/18/2021    Subjective:     Patient continues to complain of pain in her back and legs. .     Current Facility-Administered Medications   Medication Dose Route Frequency    fentaNYL (DURAGESIC) 75 mcg/hr patch 1 Patch  1 Patch TransDERmal Q72H    sodium chloride (NS) flush 5-40 mL  5-40 mL IntraVENous Q8H    sodium chloride (NS) flush 5-40 mL  5-40 mL IntraVENous PRN    acetaminophen (TYLENOL) tablet 650 mg  650 mg Oral Q6H PRN    Or    acetaminophen (TYLENOL) suppository 650 mg  650 mg Rectal Q6H PRN    polyethylene glycol (MIRALAX) packet 17 g  17 g Oral DAILY PRN    promethazine (PHENERGAN) tablet 12.5 mg  12.5 mg Oral Q6H PRN    Or    ondansetron (ZOFRAN) injection 4 mg  4 mg IntraVENous Q6H PRN    enoxaparin (LOVENOX) injection 40 mg  40 mg SubCUTAneous DAILY    dextrose 5 % - 0.45% NaCl infusion  100 mL/hr IntraVENous CONTINUOUS    docusate sodium (COLACE) capsule 100 mg  100 mg Oral BID    HYDROmorphone (PF) (DILAUDID) injection 1 mg  1 mg IntraVENous Q3H PRN    diphenhydrAMINE (BENADRYL) injection 12.5 mg  12.5 mg IntraVENous W1A PRN    folic acid (FOLVITE) tablet 1 mg  1 mg Oral DAILY    citalopram (CELEXA) tablet 40 mg  40 mg Oral QPM    hydroxyurea (HYDREA) chemo cap 500 mg  500 mg Oral BID    pantoprazole (PROTONIX) tablet 40 mg  40 mg Oral ACD    losartan (COZAAR) tablet 50 mg  50 mg Oral QHS    naloxone (NARCAN) injection 0.4 mg  0.4 mg IntraVENous PRN        Review of Systems  A comprehensive review of systems was negative.     Objective:     Patient Vitals for the past 24 hrs:   BP Temp Pulse Resp SpO2   01/20/21 0746 132/70 98.5 °F (36.9 °C) 83 18 95 %   01/20/21 0406 (!) 140/85 98.5 °F (36.9 °C) 80 18 96 %   01/20/21 0300     97 %   01/19/21 2347 137/66 99.2 °F (37.3 °C) 91 18 98 %   01/19/21 2007 (!) 143/71 99.1 °F (37.3 °C) 87 16 97 %   01/19/21 1546 (!) 153/82 98.7 °F (37.1 °C) 82 18 94 %   01/19/21 1146 (!) 167/96 98.7 °F (37.1 °C) 85 18 99 %     No intake/output data recorded. 01/18 1901 - 01/20 0700  In: 750 [P.O.:750]  Out: 2500 [Urine:2500]    Physical Exam:   Visit Vitals  /70   Pulse 83   Temp 98.5 °F (36.9 °C)   Resp 18   Ht 5' 11\" (1.803 m)   Wt 213 lb 13.5 oz (97 kg)   SpO2 95%   Breastfeeding No   BMI 29.83 kg/m²     General appearance: alert, cooperative, no distress, appears stated age  Neck: supple, symmetrical, trachea midline, no adenopathy, thyroid: not enlarged, symmetric, no tenderness/mass/nodules, no carotid bruit and no JVD  Lungs: clear to auscultation bilaterally  Heart: regular rate and rhythm, S1, S2 normal, no murmur, click, rub or gallop  Abdomen: soft, non-tender. Bowel sounds normal. No masses,  no organomegaly  Extremities: extremities normal, atraumatic, no cyanosis or edema    Assessment:     Active Problems:    * No active hospital problems. *      Plan:     1. Continue treatment of painful crises. Hemoglobin and bilirubin remained stable.

## 2021-01-20 NOTE — PROGRESS NOTES
End of Shift Note    Bedside shift change report given to Shoshana (oncoming nurse) by Luis Henry (offgoing nurse). Report included the following information SBAR, Intake/Output, MAR and Accordion    Shift worked:  Days   Shift summary and any significant changes:     No New onset       Concerns for physician to address:  No new onset   Zone phone for oncoming shift:   [phone]     Patient Information  Rulon Leyden  64 y.o.  1/18/2021 11:21 AM by Fan Roman MD. Rulon Leyden was admitted from Home    Problem List  Patient Active Problem List    Diagnosis Date Noted    AVN of femur ([facility] [address] 01/25/2020     Priority: 3 - Three    Hypokalemia 01/25/2020     Priority: 3 - Three    Viral gastritis 07/06/2020    Dehydration 07/06/2020    UTI (urinary tract infection) 07/04/2020    Acute gastroenteritis 10/27/2019    Class 1 obesity due to excess calories with serious comorbidity and body mass index (BMI) of 30.0 to 30.9 in adult 08/13/2019    Sickle cell disease ([facility] [address] 02/10/2019    Overweight 02/07/2019    Sickle cell pain crisis ([facility] [address] 09/08/2018    Carpal tunnel syndrome of right wrist 06/12/2018    Pulsatile tinnitus 10/31/2017    Sickle cell crisis ([facility] [address] 05/22/2017    Depression 10/05/2014    Hypertension 10/03/2014    Venous insufficiency 10/03/2014     Past Medical History:   Diagnosis Date    Anemia     SC hemoglobinopathy    Chronic pain     Depression     Hypertension     Liver disease     hepatitis C; pt reports \"cured\"    Sickle cell disease ([facility] [address]        Core Measures:  CVA: No No  CHF:No No  PNA:No No    Activity:  Activity Level: Up ad ermu  Number times ambulated in hallways past shift: 0  Number of times OOB to chair past shift: 0    Cardiac:   Cardiac Monitoring: Yes      Cardiac Rhythm: Normal sinus rhythm    Access:   Current line(s): PIV   Central Line? No Placement date  Reason Medically Necessary   PICC LINE?  No Placement date Reason Medically Necessary     Genitourinary: Urinary status: voiding  Urinary Catheter? No Placement Date  Reason Medically Necessary     Respiratory:   O2 Device: Room air  Chronic home O2 use?: YES  Incentive spirometer at bedside: NO  Actual Volume (ml): 1000 ml    GI:  Last Bowel Movement Date: 01/19/21  Current diet:  DIET REGULAR  Passing flatus: YES  Tolerating current diet: YES  % Diet Eaten: 90 %    Pain Management:   Patient states pain is manageable on current regimen: YES    Skin:  Nuno Score: 23  Interventions: speciality bed and increase time out of bed    Patient Safety:  Fall Score: Total Score: 1  Interventions: bed/chair alarm and pt to call before getting OOB       DVT prophylaxis:  DVT prophylaxis Med- No  DVT prophylaxis SCD or SARA- No     Wounds: (If Applicable)  Wounds- No  Location     Active Consults:  IP CONSULT TO HOSPITALIST    Length of Stay:  Expected LOS: 2d 16h  Actual LOS: 2  Discharge Plan: No   Pt continues to c/o generalized pain, meds administered as ordered.  She denies chest pain or SOB, no distress noted      Virgie Simon

## 2021-01-21 PROCEDURE — 74011250637 HC RX REV CODE- 250/637: Performed by: HOSPITALIST

## 2021-01-21 PROCEDURE — 2709999900 HC NON-CHARGEABLE SUPPLY

## 2021-01-21 PROCEDURE — 74011000258 HC RX REV CODE- 258: Performed by: HOSPITALIST

## 2021-01-21 PROCEDURE — 65660000000 HC RM CCU STEPDOWN

## 2021-01-21 PROCEDURE — 94760 N-INVAS EAR/PLS OXIMETRY 1: CPT

## 2021-01-21 PROCEDURE — 74011250636 HC RX REV CODE- 250/636: Performed by: HOSPITALIST

## 2021-01-21 RX ADMIN — CITALOPRAM HYDROBROMIDE 40 MG: 20 TABLET ORAL at 18:39

## 2021-01-21 RX ADMIN — LOSARTAN POTASSIUM 50 MG: 50 TABLET, FILM COATED ORAL at 22:02

## 2021-01-21 RX ADMIN — ONDANSETRON 4 MG: 2 INJECTION INTRAMUSCULAR; INTRAVENOUS at 12:38

## 2021-01-21 RX ADMIN — Medication 10 ML: at 15:36

## 2021-01-21 RX ADMIN — ONDANSETRON 4 MG: 2 INJECTION INTRAMUSCULAR; INTRAVENOUS at 00:57

## 2021-01-21 RX ADMIN — HYDROMORPHONE HYDROCHLORIDE 1 MG: 1 INJECTION, SOLUTION INTRAMUSCULAR; INTRAVENOUS; SUBCUTANEOUS at 18:39

## 2021-01-21 RX ADMIN — HYDROMORPHONE HYDROCHLORIDE 1 MG: 1 INJECTION, SOLUTION INTRAMUSCULAR; INTRAVENOUS; SUBCUTANEOUS at 15:31

## 2021-01-21 RX ADMIN — DIPHENHYDRAMINE HYDROCHLORIDE 12.5 MG: 50 INJECTION, SOLUTION INTRAMUSCULAR; INTRAVENOUS at 15:32

## 2021-01-21 RX ADMIN — HYDROMORPHONE HYDROCHLORIDE 1 MG: 1 INJECTION, SOLUTION INTRAMUSCULAR; INTRAVENOUS; SUBCUTANEOUS at 21:01

## 2021-01-21 RX ADMIN — DOCUSATE SODIUM 100 MG: 100 CAPSULE, LIQUID FILLED ORAL at 09:28

## 2021-01-21 RX ADMIN — DIPHENHYDRAMINE HYDROCHLORIDE 12.5 MG: 50 INJECTION, SOLUTION INTRAMUSCULAR; INTRAVENOUS at 09:28

## 2021-01-21 RX ADMIN — DIPHENHYDRAMINE HYDROCHLORIDE 12.5 MG: 50 INJECTION, SOLUTION INTRAMUSCULAR; INTRAVENOUS at 04:02

## 2021-01-21 RX ADMIN — PANTOPRAZOLE SODIUM 40 MG: 40 TABLET, DELAYED RELEASE ORAL at 18:39

## 2021-01-21 RX ADMIN — DIPHENHYDRAMINE HYDROCHLORIDE 12.5 MG: 50 INJECTION, SOLUTION INTRAMUSCULAR; INTRAVENOUS at 21:00

## 2021-01-21 RX ADMIN — Medication 10 ML: at 21:03

## 2021-01-21 RX ADMIN — Medication 10 ML: at 04:07

## 2021-01-21 RX ADMIN — ENOXAPARIN SODIUM 40 MG: 40 INJECTION SUBCUTANEOUS at 09:29

## 2021-01-21 RX ADMIN — DEXTROSE MONOHYDRATE AND SODIUM CHLORIDE 100 ML/HR: 5; .45 INJECTION, SOLUTION INTRAVENOUS at 12:38

## 2021-01-21 RX ADMIN — DOCUSATE SODIUM 100 MG: 100 CAPSULE, LIQUID FILLED ORAL at 18:00

## 2021-01-21 RX ADMIN — HYDROMORPHONE HYDROCHLORIDE 1 MG: 1 INJECTION, SOLUTION INTRAMUSCULAR; INTRAVENOUS; SUBCUTANEOUS at 12:38

## 2021-01-21 RX ADMIN — HYDROMORPHONE HYDROCHLORIDE 1 MG: 1 INJECTION, SOLUTION INTRAMUSCULAR; INTRAVENOUS; SUBCUTANEOUS at 06:34

## 2021-01-21 RX ADMIN — HYDROXYUREA 500 MG: 500 CAPSULE ORAL at 18:39

## 2021-01-21 RX ADMIN — HYDROXYUREA 500 MG: 500 CAPSULE ORAL at 09:29

## 2021-01-21 RX ADMIN — ONDANSETRON 4 MG: 2 INJECTION INTRAMUSCULAR; INTRAVENOUS at 18:39

## 2021-01-21 RX ADMIN — HYDROMORPHONE HYDROCHLORIDE 1 MG: 1 INJECTION, SOLUTION INTRAMUSCULAR; INTRAVENOUS; SUBCUTANEOUS at 04:02

## 2021-01-21 RX ADMIN — FOLIC ACID 1 MG: 1 TABLET ORAL at 09:28

## 2021-01-21 RX ADMIN — HYDROMORPHONE HYDROCHLORIDE 1 MG: 1 INJECTION, SOLUTION INTRAMUSCULAR; INTRAVENOUS; SUBCUTANEOUS at 09:29

## 2021-01-21 RX ADMIN — ONDANSETRON 4 MG: 2 INJECTION INTRAMUSCULAR; INTRAVENOUS at 06:34

## 2021-01-21 NOTE — PROGRESS NOTES
End of Shift Note    Bedside shift change report given to Shoshana (oncoming nurse) by Yenifer Hoffman (offgoing nurse). Report included the following information SBAR, Intake/Output, MAR and Accordion    Shift worked:  Days   Shift summary and any significant changes:     No New onset       Concerns for physician to address:  No new onset   Zone phone for oncoming shift:   [phone]     Patient Information  Carleen Patel  64 y.o.  1/18/2021 11:21 AM by Unique French MD. Carleen Patel was admitted from Home    Problem List  Patient Active Problem List    Diagnosis Date Noted    AVN of femur ([facility] [address] 01/25/2020     Priority: 3 - Three    Hypokalemia 01/25/2020     Priority: 3 - Three    Viral gastritis 07/06/2020    Dehydration 07/06/2020    UTI (urinary tract infection) 07/04/2020    Acute gastroenteritis 10/27/2019    Class 1 obesity due to excess calories with serious comorbidity and body mass index (BMI) of 30.0 to 30.9 in adult 08/13/2019    Sickle cell disease ([facility] [address] 02/10/2019    Overweight 02/07/2019    Sickle cell pain crisis ([facility] [address] 09/08/2018    Carpal tunnel syndrome of right wrist 06/12/2018    Pulsatile tinnitus 10/31/2017    Sickle cell crisis ([facility] [address] 05/22/2017    Depression 10/05/2014    Hypertension 10/03/2014    Venous insufficiency 10/03/2014     Past Medical History:   Diagnosis Date    Anemia     SC hemoglobinopathy    Chronic pain     Depression     Hypertension     Liver disease     hepatitis C; pt reports \"cured\"    Sickle cell disease ([facility] [address]        Core Measures:  CVA: No No  CHF:No No  PNA:No No    Activity:  Activity Level: Up ad erum  Number times ambulated in hallways past shift: 0  Number of times OOB to chair past shift: 0    Cardiac:   Cardiac Monitoring: Yes      Cardiac Rhythm: Normal sinus rhythm    Access:   Current line(s): PIV   Central Line? No Placement date  Reason Medically Necessary   PICC LINE?  No Placement date Reason Medically Necessary     Genitourinary: Urinary status: voiding  Urinary Catheter? No Placement Date  Reason Medically Necessary     Respiratory:   O2 Device: Room air  Chronic home O2 use?: YES  Incentive spirometer at bedside: NO  Actual Volume (ml): 1000 ml    GI:  Last Bowel Movement Date: 01/20/21  Current diet:  DIET REGULAR  Passing flatus: YES  Tolerating current diet: YES  % Diet Eaten: 90 %    Pain Management:   Patient states pain is manageable on current regimen: YES    Skin:  Nuno Score: 23  Interventions: speciality bed and increase time out of bed    Patient Safety:  Fall Score: Total Score: 1  Interventions: bed/chair alarm and pt to call before getting OOB       DVT prophylaxis:  DVT prophylaxis Med- No  DVT prophylaxis SCD or SARA- No     Wounds: (If Applicable)  Wounds- No  Location     Active Consults:  IP CONSULT TO HOSPITALIST    Length of Stay:  Expected LOS: 2d 16h  Actual LOS: 3  Discharge Plan: No   Pt continues to c/o generalized pain, meds administered as ordered.  She denies chest pain or SOB, no distress noted      Yousuf Lei

## 2021-01-21 NOTE — PROGRESS NOTES
Problem: Falls - Risk of  Goal: *Absence of Falls  Description: Document Israel Jesus Fall Risk and appropriate interventions in the flowsheet.   Outcome: Progressing Towards Goal  Note: Fall Risk Interventions:            Medication Interventions: Teach patient to arise slowly                   Problem: Patient Education: Go to Patient Education Activity  Goal: Patient/Family Education  Outcome: Progressing Towards Goal     Problem: Pain  Goal: *Control of Pain  Outcome: Progressing Towards Goal

## 2021-01-21 NOTE — PROGRESS NOTES
General Daily Progress Note    Admit Date: 1/18/2021    Subjective:     Patient continues to complain of pain in back and legs..     Current Facility-Administered Medications   Medication Dose Route Frequency   • fentaNYL (DURAGESIC) 75 mcg/hr patch 1 Patch  1 Patch TransDERmal Q72H   • sodium chloride (NS) flush 5-40 mL  5-40 mL IntraVENous Q8H   • sodium chloride (NS) flush 5-40 mL  5-40 mL IntraVENous PRN   • acetaminophen (TYLENOL) tablet 650 mg  650 mg Oral Q6H PRN    Or   • acetaminophen (TYLENOL) suppository 650 mg  650 mg Rectal Q6H PRN   • polyethylene glycol (MIRALAX) packet 17 g  17 g Oral DAILY PRN   • promethazine (PHENERGAN) tablet 12.5 mg  12.5 mg Oral Q6H PRN    Or   • ondansetron (ZOFRAN) injection 4 mg  4 mg IntraVENous Q6H PRN   • enoxaparin (LOVENOX) injection 40 mg  40 mg SubCUTAneous DAILY   • dextrose 5 % - 0.45% NaCl infusion  100 mL/hr IntraVENous CONTINUOUS   • docusate sodium (COLACE) capsule 100 mg  100 mg Oral BID   • HYDROmorphone (PF) (DILAUDID) injection 1 mg  1 mg IntraVENous Q3H PRN   • diphenhydrAMINE (BENADRYL) injection 12.5 mg  12.5 mg IntraVENous Q4H PRN   • folic acid (FOLVITE) tablet 1 mg  1 mg Oral DAILY   • citalopram (CELEXA) tablet 40 mg  40 mg Oral QPM   • hydroxyurea (HYDREA) chemo cap 500 mg  500 mg Oral BID   • pantoprazole (PROTONIX) tablet 40 mg  40 mg Oral ACD   • losartan (COZAAR) tablet 50 mg  50 mg Oral QHS   • naloxone (NARCAN) injection 0.4 mg  0.4 mg IntraVENous PRN        Review of Systems  A comprehensive review of systems was negative.    Objective:     Patient Vitals for the past 24 hrs:   BP Temp Pulse Resp SpO2   01/21/21 0758 (!) 126/57 98.7 °F (37.1 °C) 87 18 98 %   01/21/21 0352 (!) 147/75 98.9 °F (37.2 °C) 83 18 94 %   01/21/21 0056 133/79 98.8 °F (37.1 °C) 87 18 95 %   01/20/21 1955 (!) 148/86 98.9 °F (37.2 °C) 87 18 97 %   01/20/21 1506 116/62 98.8 °F (37.1 °C) 91 18 97 %   01/20/21 1123 (!) 171/90 98.7 °F (37.1 °C) 90 20 95 %     No intake/output  data recorded. No intake/output data recorded. Physical Exam:   Visit Vitals  BP (!) 126/57   Pulse 87   Temp 98.7 °F (37.1 °C)   Resp 18   Ht 5' 11\" (1.803 m)   Wt 213 lb 13.5 oz (97 kg)   SpO2 98%   Breastfeeding No   BMI 29.83 kg/m²     General appearance: alert, cooperative, no distress, appears stated age  Neck: supple, symmetrical, trachea midline, no adenopathy, thyroid: not enlarged, symmetric, no tenderness/mass/nodules, no carotid bruit and no JVD  Lungs: clear to auscultation bilaterally  Heart: regular rate and rhythm, S1, S2 normal, no murmur, click, rub or gallop  Abdomen: soft, non-tender. Bowel sounds normal. No masses,  no organomegaly  Extremities: extremities normal, atraumatic, no cyanosis or edema    Assessment:     Active Problems:    * No active hospital problems. *      Plan:     1. Continue treatment of painful crises. We will attempt to mobilize. No adjustment in analgesics.

## 2021-01-21 NOTE — PROGRESS NOTES
End of Shift Note     Bedside shift change report given to [facility], [facility] (oncoming nurse) by Tony Higuera RN (offgoing nurse).   Report included the following information SBAR, Intake/Output, MAR and Accordion     Shift worked: 7P-7A   Shift summary and any significant changes:     NONE         Concerns for physician to address: NONE   Zone phone for oncoming shift:   [phone]      Patient Information  Dennis Kaufman  64 y.o.  1/18/2021 11:21 AM by Brett Amor MD. Dennis Kaufman was admitted from Home     Problem List        Patient Active Problem List     Diagnosis Date Noted    AVN of femur ([facility] [address] 01/25/2020       Priority: 3 - Three    Hypokalemia 01/25/2020       Priority: 3 - Three    Viral gastritis 07/06/2020    Dehydration 07/06/2020    UTI (urinary tract infection) 07/04/2020    Acute gastroenteritis 10/27/2019    Class 1 obesity due to excess calories with serious comorbidity and body mass index (BMI) of 30.0 to 30.9 in adult 08/13/2019    Sickle cell disease ([facility] [address] 02/10/2019    Overweight 02/07/2019    Sickle cell pain crisis ([facility] [address] 09/08/2018    Carpal tunnel syndrome of right wrist 06/12/2018    Pulsatile tinnitus 10/31/2017    Sickle cell crisis ([facility] [address] 05/22/2017    Depression 10/05/2014    Hypertension 10/03/2014    Venous insufficiency 10/03/2014           Past Medical History:   Diagnosis Date    Anemia       SC hemoglobinopathy    Chronic pain      Depression      Hypertension      Liver disease       hepatitis C; pt reports \"cured\"    Sickle cell disease (HCC)           Core Measures:  Sickle Cell Crisis     Activity:  Activity Level: Up ad erum  Number times ambulated in hallways past shift: 0  Number of times OOB to chair past shift: 0     Cardiac:   Cardiac Monitoring: Yes      Cardiac Rhythm: Normal sinus rhythm     Access:   Current line(s): PIV      Genitourinary:   Urinary status: Voiding, bathroom privilages     Respiratory:   O2 Device: Room air     GI:  Last Bowel Movement Date: 01/19/21  Current diet:  DIET REGULAR  Passing flatus: YES  Tolerating current diet: YES     Pain Management:   Patient states pain is manageable on current regimen: YES     Skin:  Nuno Score: 23  Interventions: speciality bed and increase time out of bed    Patient Safety:  Fall Score: Total Score: 1  Interventions: bed/chair alarm and pt to call before getting OOB     DVT prophylaxis:  DVT prophylaxis Med- No  DVT prophylaxis SCD or SARA- No      Wounds: (If Applicable)  Wounds- No     Active Consults:  IP CONSULT TO HOSPITALIST     Length of Stay:  Expected LOS: 2d 16h  Actual LOS: 3D  Discharge Plan: Home when pain subsides  Pt continues to c/o generalized pain, meds administered as ordered.  She denies chest pain or SOB, no distress noted

## 2021-01-22 LAB
ALBUMIN SERPL-MCNC: 3 G/DL (ref 3.5–5)
ALBUMIN/GLOB SERPL: 0.7 {RATIO} (ref 1.1–2.2)
ALP SERPL-CCNC: 105 U/L (ref 45–117)
ALT SERPL-CCNC: 28 U/L (ref 12–78)
ANION GAP SERPL CALC-SCNC: 3 MMOL/L (ref 5–15)
AST SERPL-CCNC: 37 U/L (ref 15–37)
BASOPHILS # BLD: 0 K/UL (ref 0–0.1)
BASOPHILS NFR BLD: 0 % (ref 0–1)
BILIRUB SERPL-MCNC: 1.4 MG/DL (ref 0.2–1)
BLASTS NFR BLD MANUAL: 0 %
BUN SERPL-MCNC: 6 MG/DL (ref 6–20)
BUN/CREAT SERPL: 8 (ref 12–20)
CALCIUM SERPL-MCNC: 8.1 MG/DL (ref 8.5–10.1)
CHLORIDE SERPL-SCNC: 105 MMOL/L (ref 97–108)
CO2 SERPL-SCNC: 27 MMOL/L (ref 21–32)
CREAT SERPL-MCNC: 0.72 MG/DL (ref 0.55–1.02)
DIFFERENTIAL METHOD BLD: ABNORMAL
EOSINOPHIL # BLD: 0.1 K/UL (ref 0–0.4)
EOSINOPHIL NFR BLD: 1 % (ref 0–7)
ERYTHROCYTE [DISTWIDTH] IN BLOOD BY AUTOMATED COUNT: 15.1 % (ref 11.5–14.5)
GLOBULIN SER CALC-MCNC: 4.3 G/DL (ref 2–4)
GLUCOSE SERPL-MCNC: 124 MG/DL (ref 65–100)
HCT VFR BLD AUTO: 22.9 % (ref 35–47)
HGB BLD-MCNC: 7.5 G/DL (ref 11.5–16)
IMM GRANULOCYTES # BLD AUTO: 0 K/UL
IMM GRANULOCYTES NFR BLD AUTO: 0 %
LYMPHOCYTES # BLD: 2.7 K/UL (ref 0.8–3.5)
LYMPHOCYTES NFR BLD: 42 % (ref 12–49)
MCH RBC QN AUTO: 39.3 PG (ref 26–34)
MCHC RBC AUTO-ENTMCNC: 32.8 G/DL (ref 30–36.5)
MCV RBC AUTO: 119.9 FL (ref 80–99)
METAMYELOCYTES NFR BLD MANUAL: 0 %
MONOCYTES # BLD: 0.5 K/UL (ref 0–1)
MONOCYTES NFR BLD: 7 % (ref 5–13)
MYELOCYTES NFR BLD MANUAL: 0 %
NEUTS BAND NFR BLD MANUAL: 2 % (ref 0–6)
NEUTS SEG # BLD: 3.2 K/UL (ref 1.8–8)
NEUTS SEG NFR BLD: 48 % (ref 32–75)
NRBC # BLD: 0.56 K/UL (ref 0–0.01)
NRBC BLD-RTO: 8.6 PER 100 WBC
OTHER CELLS NFR BLD MANUAL: 0 %
PLATELET # BLD AUTO: 113 K/UL (ref 150–400)
PMV BLD AUTO: 11.3 FL (ref 8.9–12.9)
POTASSIUM SERPL-SCNC: 3.9 MMOL/L (ref 3.5–5.1)
PROMYELOCYTES NFR BLD MANUAL: 0 %
PROT SERPL-MCNC: 7.3 G/DL (ref 6.4–8.2)
RBC # BLD AUTO: 1.91 M/UL (ref 3.8–5.2)
RBC MORPH BLD: ABNORMAL
RBC MORPH BLD: ABNORMAL
SODIUM SERPL-SCNC: 135 MMOL/L (ref 136–145)
WBC # BLD AUTO: 6.5 K/UL (ref 3.6–11)

## 2021-01-22 PROCEDURE — 74011250637 HC RX REV CODE- 250/637: Performed by: HOSPITALIST

## 2021-01-22 PROCEDURE — 74011250636 HC RX REV CODE- 250/636: Performed by: HOSPITALIST

## 2021-01-22 PROCEDURE — 85027 COMPLETE CBC AUTOMATED: CPT

## 2021-01-22 PROCEDURE — 74011000258 HC RX REV CODE- 258: Performed by: HOSPITALIST

## 2021-01-22 PROCEDURE — 36415 COLL VENOUS BLD VENIPUNCTURE: CPT

## 2021-01-22 PROCEDURE — 65660000000 HC RM CCU STEPDOWN

## 2021-01-22 PROCEDURE — 74011250637 HC RX REV CODE- 250/637: Performed by: INTERNAL MEDICINE

## 2021-01-22 PROCEDURE — 80053 COMPREHEN METABOLIC PANEL: CPT

## 2021-01-22 RX ADMIN — HYDROMORPHONE HYDROCHLORIDE 1 MG: 1 INJECTION, SOLUTION INTRAMUSCULAR; INTRAVENOUS; SUBCUTANEOUS at 19:32

## 2021-01-22 RX ADMIN — DOCUSATE SODIUM 100 MG: 100 CAPSULE, LIQUID FILLED ORAL at 17:39

## 2021-01-22 RX ADMIN — DIPHENHYDRAMINE HYDROCHLORIDE 12.5 MG: 50 INJECTION, SOLUTION INTRAMUSCULAR; INTRAVENOUS at 08:29

## 2021-01-22 RX ADMIN — CITALOPRAM HYDROBROMIDE 40 MG: 20 TABLET ORAL at 17:39

## 2021-01-22 RX ADMIN — HYDROMORPHONE HYDROCHLORIDE 1 MG: 1 INJECTION, SOLUTION INTRAMUSCULAR; INTRAVENOUS; SUBCUTANEOUS at 06:10

## 2021-01-22 RX ADMIN — HYDROMORPHONE HYDROCHLORIDE 1 MG: 1 INJECTION, SOLUTION INTRAMUSCULAR; INTRAVENOUS; SUBCUTANEOUS at 02:51

## 2021-01-22 RX ADMIN — DEXTROSE MONOHYDRATE AND SODIUM CHLORIDE 100 ML/HR: 5; .45 INJECTION, SOLUTION INTRAVENOUS at 22:39

## 2021-01-22 RX ADMIN — Medication 10 ML: at 13:22

## 2021-01-22 RX ADMIN — HYDROMORPHONE HYDROCHLORIDE 1 MG: 1 INJECTION, SOLUTION INTRAMUSCULAR; INTRAVENOUS; SUBCUTANEOUS at 08:29

## 2021-01-22 RX ADMIN — DIPHENHYDRAMINE HYDROCHLORIDE 12.5 MG: 50 INJECTION, SOLUTION INTRAMUSCULAR; INTRAVENOUS at 02:51

## 2021-01-22 RX ADMIN — DIPHENHYDRAMINE HYDROCHLORIDE 12.5 MG: 50 INJECTION, SOLUTION INTRAMUSCULAR; INTRAVENOUS at 22:25

## 2021-01-22 RX ADMIN — FOLIC ACID 1 MG: 1 TABLET ORAL at 08:29

## 2021-01-22 RX ADMIN — HYDROMORPHONE HYDROCHLORIDE 1 MG: 1 INJECTION, SOLUTION INTRAMUSCULAR; INTRAVENOUS; SUBCUTANEOUS at 13:20

## 2021-01-22 RX ADMIN — POLYETHYLENE GLYCOL 3350 17 G: 17 POWDER, FOR SOLUTION ORAL at 10:04

## 2021-01-22 RX ADMIN — PANTOPRAZOLE SODIUM 40 MG: 40 TABLET, DELAYED RELEASE ORAL at 16:17

## 2021-01-22 RX ADMIN — ONDANSETRON 4 MG: 2 INJECTION INTRAMUSCULAR; INTRAVENOUS at 06:10

## 2021-01-22 RX ADMIN — HYDROMORPHONE HYDROCHLORIDE 1 MG: 1 INJECTION, SOLUTION INTRAMUSCULAR; INTRAVENOUS; SUBCUTANEOUS at 00:00

## 2021-01-22 RX ADMIN — Medication 10 ML: at 06:17

## 2021-01-22 RX ADMIN — Medication 10 ML: at 22:25

## 2021-01-22 RX ADMIN — ONDANSETRON 4 MG: 2 INJECTION INTRAMUSCULAR; INTRAVENOUS at 13:20

## 2021-01-22 RX ADMIN — ONDANSETRON 4 MG: 2 INJECTION INTRAMUSCULAR; INTRAVENOUS at 00:00

## 2021-01-22 RX ADMIN — HYDROMORPHONE HYDROCHLORIDE 1 MG: 1 INJECTION, SOLUTION INTRAMUSCULAR; INTRAVENOUS; SUBCUTANEOUS at 22:25

## 2021-01-22 RX ADMIN — HYDROMORPHONE HYDROCHLORIDE 1 MG: 1 INJECTION, SOLUTION INTRAMUSCULAR; INTRAVENOUS; SUBCUTANEOUS at 16:33

## 2021-01-22 RX ADMIN — DIPHENHYDRAMINE HYDROCHLORIDE 12.5 MG: 50 INJECTION, SOLUTION INTRAMUSCULAR; INTRAVENOUS at 16:33

## 2021-01-22 RX ADMIN — DOCUSATE SODIUM 100 MG: 100 CAPSULE, LIQUID FILLED ORAL at 08:29

## 2021-01-22 RX ADMIN — HYDROXYUREA 500 MG: 500 CAPSULE ORAL at 17:38

## 2021-01-22 RX ADMIN — HYDROXYUREA 500 MG: 500 CAPSULE ORAL at 10:04

## 2021-01-22 RX ADMIN — ENOXAPARIN SODIUM 40 MG: 40 INJECTION SUBCUTANEOUS at 08:29

## 2021-01-22 RX ADMIN — ONDANSETRON 4 MG: 2 INJECTION INTRAMUSCULAR; INTRAVENOUS at 19:32

## 2021-01-22 NOTE — PROGRESS NOTES
Went to pull hydromorphone Dilaudid from [facility] and miscounted amount of medication in drawer creating discrepancy. I miscounted and put in 5, recounted with Ingrid and there were 6 of Dilaudid stocked. Ingrid unit Director notified and witnessed medication count error. Byron Thomson from pharmacy paged and able to fix error in pyxis.

## 2021-01-22 NOTE — PROGRESS NOTES
End of Shift Note    Bedside shift change report given to [address] [place] [address]  (oncoming nurse) by Rajeev Friedman (offgoing nurse). Report included the following information SBAR, Intake/Output, MAR and Accordion    Shift worked:  Days   Shift summary and any significant changes:     pain medication given q3 hours for chronic pain       Concerns for physician to address:  None   Zone phone for oncoming shift:   [phone]     Patient Information  Ricci Murillo  64 y.o.  1/18/2021 11:21 AM by Cedrick Overton MD. Ricci Murillo was admitted from Home    Problem List  Patient Active Problem List    Diagnosis Date Noted    AVN of femur ([facility] [address] 01/25/2020     Priority: 3 - Three    Hypokalemia 01/25/2020     Priority: 3 - Three    Viral gastritis 07/06/2020    Dehydration 07/06/2020    UTI (urinary tract infection) 07/04/2020    Acute gastroenteritis 10/27/2019    Class 1 obesity due to excess calories with serious comorbidity and body mass index (BMI) of 30.0 to 30.9 in adult 08/13/2019    Sickle cell disease ([facility] [address] 02/10/2019    Overweight 02/07/2019    Sickle cell pain crisis ([facility] [address] 09/08/2018    Carpal tunnel syndrome of right wrist 06/12/2018    Pulsatile tinnitus 10/31/2017    Sickle cell crisis ([facility] [address] 05/22/2017    Depression 10/05/2014    Hypertension 10/03/2014    Venous insufficiency 10/03/2014     Past Medical History:   Diagnosis Date    Anemia     SC hemoglobinopathy    Chronic pain     Depression     Hypertension     Liver disease     hepatitis C; pt reports \"cured\"    Sickle cell disease ([facility] [address]        Core Measures:  CVA: No No  CHF:No No  PNA:No No    Activity:  Activity Level: Up ad erum  Number times ambulated in hallways past shift: 0  Number of times OOB to chair past shift: 0    Cardiac:   Cardiac Monitoring: Yes      Cardiac Rhythm: Normal sinus rhythm    Access:   Current line(s): PIV   Central Line? No Placement date  Reason Medically Necessary   PICC LINE?  No Placement date Reason Medically Necessary     Genitourinary:   Urinary status: voiding  Urinary Catheter? No Placement Date  Reason Medically Necessary     Respiratory:   O2 Device: Room air  Chronic home O2 use?: YES  Incentive spirometer at bedside: NO  Actual Volume (ml): 1000 ml    GI:  Last Bowel Movement Date: 01/18/21(per pt )  Current diet:  DIET REGULAR  Passing flatus: YES  Tolerating current diet: YES  % Diet Eaten: 100 %    Pain Management:   Patient states pain is manageable on current regimen: YES    Skin:  Nuno Score: 21  Interventions: speciality bed and increase time out of bed    Patient Safety:  Fall Score: Total Score: 1  Interventions: bed/chair alarm and pt to call before getting OOB       DVT prophylaxis:  DVT prophylaxis Med- No  DVT prophylaxis SCD or SARA- No     Wounds: (If Applicable)  Wounds- No  Location     Active Consults:  IP CONSULT TO HOSPITALIST    Length of Stay:  Expected LOS: 2d 16h  Actual LOS: 4  Discharge Plan: No   Pt continues to c/o generalized pain, meds administered as ordered.  She denies chest pain or SOB, no distress noted      Derrick Novak

## 2021-01-22 NOTE — PROGRESS NOTES
Bedside shift change report given to Holly Magana RN (oncoming nurse) by David Valentine RN (offgoing nurse). Report included the following information SBAR, Kardex and Cardiac Rhythm NSR.

## 2021-01-23 LAB
ALBUMIN SERPL-MCNC: 3.2 G/DL (ref 3.5–5)
ALBUMIN/GLOB SERPL: 0.7 {RATIO} (ref 1.1–2.2)
ALP SERPL-CCNC: 103 U/L (ref 45–117)
ALT SERPL-CCNC: 25 U/L (ref 12–78)
ANION GAP SERPL CALC-SCNC: 3 MMOL/L (ref 5–15)
AST SERPL-CCNC: 32 U/L (ref 15–37)
BASOPHILS # BLD: 0 K/UL (ref 0–0.1)
BASOPHILS NFR BLD: 0 % (ref 0–1)
BILIRUB SERPL-MCNC: 1.4 MG/DL (ref 0.2–1)
BLASTS NFR BLD MANUAL: 0 %
BUN SERPL-MCNC: 7 MG/DL (ref 6–20)
BUN/CREAT SERPL: 8 (ref 12–20)
CALCIUM SERPL-MCNC: 8.4 MG/DL (ref 8.5–10.1)
CHLORIDE SERPL-SCNC: 103 MMOL/L (ref 97–108)
CO2 SERPL-SCNC: 30 MMOL/L (ref 21–32)
CREAT SERPL-MCNC: 0.84 MG/DL (ref 0.55–1.02)
DIFFERENTIAL METHOD BLD: ABNORMAL
EOSINOPHIL # BLD: 0.1 K/UL (ref 0–0.4)
EOSINOPHIL NFR BLD: 1 % (ref 0–7)
ERYTHROCYTE [DISTWIDTH] IN BLOOD BY AUTOMATED COUNT: 14.1 % (ref 11.5–14.5)
GLOBULIN SER CALC-MCNC: 4.4 G/DL (ref 2–4)
GLUCOSE SERPL-MCNC: 83 MG/DL (ref 65–100)
HCT VFR BLD AUTO: 22.9 % (ref 35–47)
HGB BLD-MCNC: 8 G/DL (ref 11.5–16)
IMM GRANULOCYTES # BLD AUTO: 0 K/UL
IMM GRANULOCYTES NFR BLD AUTO: 0 %
LYMPHOCYTES # BLD: 2.6 K/UL (ref 0.8–3.5)
LYMPHOCYTES NFR BLD: 40 % (ref 12–49)
MCH RBC QN AUTO: 39.6 PG (ref 26–34)
MCHC RBC AUTO-ENTMCNC: 34.9 G/DL (ref 30–36.5)
MCV RBC AUTO: 113.4 FL (ref 80–99)
METAMYELOCYTES NFR BLD MANUAL: 0 %
MONOCYTES # BLD: 0.6 K/UL (ref 0–1)
MONOCYTES NFR BLD: 9 % (ref 5–13)
MYELOCYTES NFR BLD MANUAL: 0 %
NEUTS BAND NFR BLD MANUAL: 0 % (ref 0–6)
NEUTS SEG # BLD: 3.3 K/UL (ref 1.8–8)
NEUTS SEG NFR BLD: 50 % (ref 32–75)
NRBC # BLD: 0.26 K/UL (ref 0–0.01)
NRBC BLD-RTO: 3.9 PER 100 WBC
OTHER CELLS NFR BLD MANUAL: 0 %
PLATELET # BLD AUTO: 172 K/UL (ref 150–400)
PMV BLD AUTO: 11.3 FL (ref 8.9–12.9)
POTASSIUM SERPL-SCNC: 4 MMOL/L (ref 3.5–5.1)
PROMYELOCYTES NFR BLD MANUAL: 0 %
PROT SERPL-MCNC: 7.6 G/DL (ref 6.4–8.2)
RBC # BLD AUTO: 2.02 M/UL (ref 3.8–5.2)
RBC MORPH BLD: ABNORMAL
SODIUM SERPL-SCNC: 136 MMOL/L (ref 136–145)
WBC # BLD AUTO: 6.6 K/UL (ref 3.6–11)

## 2021-01-23 PROCEDURE — 85027 COMPLETE CBC AUTOMATED: CPT

## 2021-01-23 PROCEDURE — 74011250637 HC RX REV CODE- 250/637: Performed by: HOSPITALIST

## 2021-01-23 PROCEDURE — 36415 COLL VENOUS BLD VENIPUNCTURE: CPT

## 2021-01-23 PROCEDURE — 65660000000 HC RM CCU STEPDOWN

## 2021-01-23 PROCEDURE — 74011000258 HC RX REV CODE- 258: Performed by: INTERNAL MEDICINE

## 2021-01-23 PROCEDURE — 80053 COMPREHEN METABOLIC PANEL: CPT

## 2021-01-23 PROCEDURE — 74011250636 HC RX REV CODE- 250/636: Performed by: INTERNAL MEDICINE

## 2021-01-23 PROCEDURE — 74011250636 HC RX REV CODE- 250/636: Performed by: HOSPITALIST

## 2021-01-23 PROCEDURE — 94760 N-INVAS EAR/PLS OXIMETRY 1: CPT

## 2021-01-23 RX ADMIN — ONDANSETRON 4 MG: 2 INJECTION INTRAMUSCULAR; INTRAVENOUS at 19:35

## 2021-01-23 RX ADMIN — LOSARTAN POTASSIUM 50 MG: 50 TABLET, FILM COATED ORAL at 22:10

## 2021-01-23 RX ADMIN — DIPHENHYDRAMINE HYDROCHLORIDE 12.5 MG: 50 INJECTION, SOLUTION INTRAMUSCULAR; INTRAVENOUS at 04:37

## 2021-01-23 RX ADMIN — Medication 10 ML: at 22:11

## 2021-01-23 RX ADMIN — DIPHENHYDRAMINE HYDROCHLORIDE 12.5 MG: 50 INJECTION, SOLUTION INTRAMUSCULAR; INTRAVENOUS at 22:41

## 2021-01-23 RX ADMIN — ONDANSETRON 4 MG: 2 INJECTION INTRAMUSCULAR; INTRAVENOUS at 13:38

## 2021-01-23 RX ADMIN — DOCUSATE SODIUM 100 MG: 100 CAPSULE, LIQUID FILLED ORAL at 08:16

## 2021-01-23 RX ADMIN — HYDROMORPHONE HYDROCHLORIDE 1 MG: 1 INJECTION, SOLUTION INTRAMUSCULAR; INTRAVENOUS; SUBCUTANEOUS at 19:35

## 2021-01-23 RX ADMIN — HYDROMORPHONE HYDROCHLORIDE 1 MG: 1 INJECTION, SOLUTION INTRAMUSCULAR; INTRAVENOUS; SUBCUTANEOUS at 01:51

## 2021-01-23 RX ADMIN — DIPHENHYDRAMINE HYDROCHLORIDE 12.5 MG: 50 INJECTION, SOLUTION INTRAMUSCULAR; INTRAVENOUS at 10:30

## 2021-01-23 RX ADMIN — HYDROMORPHONE HYDROCHLORIDE 1 MG: 1 INJECTION, SOLUTION INTRAMUSCULAR; INTRAVENOUS; SUBCUTANEOUS at 13:38

## 2021-01-23 RX ADMIN — HYDROMORPHONE HYDROCHLORIDE 1 MG: 1 INJECTION, SOLUTION INTRAMUSCULAR; INTRAVENOUS; SUBCUTANEOUS at 07:42

## 2021-01-23 RX ADMIN — PANTOPRAZOLE SODIUM 40 MG: 40 TABLET, DELAYED RELEASE ORAL at 16:39

## 2021-01-23 RX ADMIN — HYDROMORPHONE HYDROCHLORIDE 1 MG: 1 INJECTION, SOLUTION INTRAMUSCULAR; INTRAVENOUS; SUBCUTANEOUS at 16:39

## 2021-01-23 RX ADMIN — DIPHENHYDRAMINE HYDROCHLORIDE 12.5 MG: 50 INJECTION, SOLUTION INTRAMUSCULAR; INTRAVENOUS at 16:39

## 2021-01-23 RX ADMIN — CITALOPRAM HYDROBROMIDE 40 MG: 20 TABLET ORAL at 17:32

## 2021-01-23 RX ADMIN — LOSARTAN POTASSIUM 50 MG: 50 TABLET, FILM COATED ORAL at 00:13

## 2021-01-23 RX ADMIN — POTASSIUM CHLORIDE: 2 INJECTION, SOLUTION, CONCENTRATE INTRAVENOUS at 09:16

## 2021-01-23 RX ADMIN — ONDANSETRON 4 MG: 2 INJECTION INTRAMUSCULAR; INTRAVENOUS at 07:42

## 2021-01-23 RX ADMIN — HYDROMORPHONE HYDROCHLORIDE 1 MG: 1 INJECTION, SOLUTION INTRAMUSCULAR; INTRAVENOUS; SUBCUTANEOUS at 22:41

## 2021-01-23 RX ADMIN — FOLIC ACID 1 MG: 1 TABLET ORAL at 08:16

## 2021-01-23 RX ADMIN — POTASSIUM CHLORIDE: 2 INJECTION, SOLUTION, CONCENTRATE INTRAVENOUS at 22:11

## 2021-01-23 RX ADMIN — Medication 10 ML: at 05:18

## 2021-01-23 RX ADMIN — DOCUSATE SODIUM 100 MG: 100 CAPSULE, LIQUID FILLED ORAL at 17:32

## 2021-01-23 RX ADMIN — HYDROMORPHONE HYDROCHLORIDE 1 MG: 1 INJECTION, SOLUTION INTRAMUSCULAR; INTRAVENOUS; SUBCUTANEOUS at 10:30

## 2021-01-23 RX ADMIN — ONDANSETRON 4 MG: 2 INJECTION INTRAMUSCULAR; INTRAVENOUS at 01:51

## 2021-01-23 RX ADMIN — HYDROXYUREA 500 MG: 500 CAPSULE ORAL at 08:16

## 2021-01-23 RX ADMIN — HYDROMORPHONE HYDROCHLORIDE 1 MG: 1 INJECTION, SOLUTION INTRAMUSCULAR; INTRAVENOUS; SUBCUTANEOUS at 04:37

## 2021-01-23 RX ADMIN — HYDROXYUREA 500 MG: 500 CAPSULE ORAL at 18:30

## 2021-01-23 RX ADMIN — ENOXAPARIN SODIUM 40 MG: 40 INJECTION SUBCUTANEOUS at 08:16

## 2021-01-23 NOTE — PROGRESS NOTES
General Daily Progress Note    Admit Date: 1/18/2021    Subjective:     Patient complains of back and leg pain----------note for 1/22/21. Current Facility-Administered Medications   Medication Dose Route Frequency    dextrose 5 % - 0.45% NaCl 1,000 mL with potassium chloride 30 mEq infusion   IntraVENous CONTINUOUS    fentaNYL (DURAGESIC) 75 mcg/hr patch 1 Patch  1 Patch TransDERmal Q72H    sodium chloride (NS) flush 5-40 mL  5-40 mL IntraVENous Q8H    sodium chloride (NS) flush 5-40 mL  5-40 mL IntraVENous PRN    acetaminophen (TYLENOL) tablet 650 mg  650 mg Oral Q6H PRN    Or    acetaminophen (TYLENOL) suppository 650 mg  650 mg Rectal Q6H PRN    polyethylene glycol (MIRALAX) packet 17 g  17 g Oral DAILY PRN    promethazine (PHENERGAN) tablet 12.5 mg  12.5 mg Oral Q6H PRN    Or    ondansetron (ZOFRAN) injection 4 mg  4 mg IntraVENous Q6H PRN    enoxaparin (LOVENOX) injection 40 mg  40 mg SubCUTAneous DAILY    docusate sodium (COLACE) capsule 100 mg  100 mg Oral BID    HYDROmorphone (PF) (DILAUDID) injection 1 mg  1 mg IntraVENous Q3H PRN    diphenhydrAMINE (BENADRYL) injection 12.5 mg  12.5 mg IntraVENous K8H PRN    folic acid (FOLVITE) tablet 1 mg  1 mg Oral DAILY    citalopram (CELEXA) tablet 40 mg  40 mg Oral QPM    hydroxyurea (HYDREA) chemo cap 500 mg  500 mg Oral BID    pantoprazole (PROTONIX) tablet 40 mg  40 mg Oral ACD    losartan (COZAAR) tablet 50 mg  50 mg Oral QHS    naloxone (NARCAN) injection 0.4 mg  0.4 mg IntraVENous PRN        Review of Systems  A comprehensive review of systems was negative.     Objective:     Patient Vitals for the past 24 hrs:   BP Temp Pulse Resp SpO2   01/23/21 0854 129/64 97.9 °F (36.6 °C) 84 20 91 %   01/23/21 0300 (!) 132/94 97.1 °F (36.2 °C) 82 20 98 %   01/22/21 2330 139/77 98.6 °F (37 °C) 79 20 96 %   01/22/21 1945 (!) 166/85 99.3 °F (37.4 °C) 88 20 98 %   01/22/21 1553 128/64 98.9 °F (37.2 °C) 84 20 98 %   01/22/21 1206 136/88 98.6 °F (37 °C) 91 20 97 %     No intake/output data recorded. 01/21 1901 - 01/23 0700  In: 370 [P.O.:370]  Out: -     Physical Exam:   Visit Vitals  /64 (BP 1 Location: Left arm, BP Patient Position: At rest)   Pulse 84   Temp 97.9 °F (36.6 °C)   Resp 20   Ht 5' 11\" (1.803 m)   Wt 213 lb 13.5 oz (97 kg)   SpO2 91%   Breastfeeding No   BMI 29.83 kg/m²     General appearance: alert, cooperative, no distress, appears stated age  Neck: supple, symmetrical, trachea midline, no adenopathy, thyroid: not enlarged, symmetric, no tenderness/mass/nodules, no carotid bruit and no JVD  Lungs: clear to auscultation bilaterally  Heart: regular rate and rhythm, S1, S2 normal, no murmur, click, rub or gallop  Abdomen: soft, non-tender. Bowel sounds normal. No masses,  no organomegaly  Extremities: extremities normal, atraumatic, no cyanosis or edema    Assessment:     Active Problems:    Hypertension (10/3/2014)      Sickle cell crisis ([facility] [address] (5/22/2017)      Class 1 obesity due to excess calories with serious comorbidity and body mass index (BMI) of 30.0 to 30.9 in adult (8/13/2019)        Plan:     1. Reduction in hemoglobin and slight increase in bilirubin noted. Clinically she is unchanged. Continue pain control.

## 2021-01-23 NOTE — PROGRESS NOTES
End of Shift Note    Bedside shift change report given to Henrietta Childers RN  (oncoming nurse) by Elio Mackenzie (offgoing nurse). Report included the following information SBAR, Intake/Output, MAR and Accordion    Shift worked:  Nights   Shift summary and any significant changes:     pain medication given q3 hours for chronic pain       Concerns for physician to address:  None   Zone phone for oncoming shift:   [phone]     Patient Information  Aman Owens  64 y.o.  1/18/2021 11:21 AM by Nadia Camacho MD. Aman Owens was admitted from Home    Problem List  Patient Active Problem List    Diagnosis Date Noted    AVN of femur ([facility] [address] 01/25/2020     Priority: 3 - Three    Hypokalemia 01/25/2020     Priority: 3 - Three    Viral gastritis 07/06/2020    Dehydration 07/06/2020    UTI (urinary tract infection) 07/04/2020    Acute gastroenteritis 10/27/2019    Class 1 obesity due to excess calories with serious comorbidity and body mass index (BMI) of 30.0 to 30.9 in adult 08/13/2019    Sickle cell disease ([facility] [address] 02/10/2019    Overweight 02/07/2019    Sickle cell pain crisis ([facility] [address] 09/08/2018    Carpal tunnel syndrome of right wrist 06/12/2018    Pulsatile tinnitus 10/31/2017    Sickle cell crisis ([facility] [address] 05/22/2017    Depression 10/05/2014    Hypertension 10/03/2014    Venous insufficiency 10/03/2014     Past Medical History:   Diagnosis Date    Anemia     SC hemoglobinopathy    Chronic pain     Depression     Hypertension     Liver disease     hepatitis C; pt reports \"cured\"    Sickle cell disease ([facility] [address]        Core Measures:  CVA: No No  CHF:No No  PNA:No No    Activity:  Activity Level: Up ad erum  Number times ambulated in hallways past shift: 0  Number of times OOB to chair past shift: 0    Cardiac:   Cardiac Monitoring: Yes      Cardiac Rhythm: Normal sinus rhythm    Access:   Current line(s): PIV   Central Line? No Placement date  Reason Medically Necessary   PICC LINE?  No Placement date Reason Medically Necessary     Genitourinary:   Urinary status: voiding  Urinary Catheter? No Placement Date  Reason Medically Necessary     Respiratory:   O2 Device: Room air  Chronic home O2 use?: YES  Incentive spirometer at bedside: NO  Actual Volume (ml): 1000 ml    GI:  Last Bowel Movement Date: 01/18/21  Current diet:  DIET REGULAR  Passing flatus: YES  Tolerating current diet: YES  % Diet Eaten: 100 %    Pain Management:   Patient states pain is manageable on current regimen: YES    Skin:  Nuno Score: 21  Interventions: speciality bed and increase time out of bed    Patient Safety:  Fall Score: Total Score: 1  Interventions: bed/chair alarm and pt to call before getting OOB       DVT prophylaxis:  DVT prophylaxis Med- No  DVT prophylaxis SCD or SARA- No     Wounds: (If Applicable)  Wounds- No  Location     Active Consults:  IP CONSULT TO HOSPITALIST    Length of Stay:  Expected LOS: 2d 16h  Actual LOS: 5  Discharge Plan: No   Pt continues to c/o generalized pain, meds administered as ordered.  She denies chest pain or SOB, no distress noted      Sukumar Rinaldi

## 2021-01-23 NOTE — PROGRESS NOTES
General Daily Progress Note    Admit Date: 1/18/2021    Subjective:     Patient complains of back and leg pain. Current Facility-Administered Medications   Medication Dose Route Frequency    dextrose 5 % - 0.45% NaCl 1,000 mL with potassium chloride 30 mEq infusion   IntraVENous CONTINUOUS    fentaNYL (DURAGESIC) 75 mcg/hr patch 1 Patch  1 Patch TransDERmal Q72H    sodium chloride (NS) flush 5-40 mL  5-40 mL IntraVENous Q8H    sodium chloride (NS) flush 5-40 mL  5-40 mL IntraVENous PRN    acetaminophen (TYLENOL) tablet 650 mg  650 mg Oral Q6H PRN    Or    acetaminophen (TYLENOL) suppository 650 mg  650 mg Rectal Q6H PRN    polyethylene glycol (MIRALAX) packet 17 g  17 g Oral DAILY PRN    promethazine (PHENERGAN) tablet 12.5 mg  12.5 mg Oral Q6H PRN    Or    ondansetron (ZOFRAN) injection 4 mg  4 mg IntraVENous Q6H PRN    enoxaparin (LOVENOX) injection 40 mg  40 mg SubCUTAneous DAILY    docusate sodium (COLACE) capsule 100 mg  100 mg Oral BID    HYDROmorphone (PF) (DILAUDID) injection 1 mg  1 mg IntraVENous Q3H PRN    diphenhydrAMINE (BENADRYL) injection 12.5 mg  12.5 mg IntraVENous K2I PRN    folic acid (FOLVITE) tablet 1 mg  1 mg Oral DAILY    citalopram (CELEXA) tablet 40 mg  40 mg Oral QPM    hydroxyurea (HYDREA) chemo cap 500 mg  500 mg Oral BID    pantoprazole (PROTONIX) tablet 40 mg  40 mg Oral ACD    losartan (COZAAR) tablet 50 mg  50 mg Oral QHS    naloxone (NARCAN) injection 0.4 mg  0.4 mg IntraVENous PRN        Review of Systems  A comprehensive review of systems was negative.     Objective:     Patient Vitals for the past 24 hrs:   BP Temp Pulse Resp SpO2   01/23/21 0854 129/64 97.9 °F (36.6 °C) 84 20 91 %   01/23/21 0300 (!) 132/94 97.1 °F (36.2 °C) 82 20 98 %   01/22/21 2330 139/77 98.6 °F (37 °C) 79 20 96 %   01/22/21 1945 (!) 166/85 99.3 °F (37.4 °C) 88 20 98 %   01/22/21 1553 128/64 98.9 °F (37.2 °C) 84 20 98 %   01/22/21 1206 136/88 98.6 °F (37 °C) 91 20 97 %     No intake/output data recorded. 01/21 1901 - 01/23 0700  In: 370 [P.O.:370]  Out: -     Physical Exam:   Visit Vitals  /64 (BP 1 Location: Left arm, BP Patient Position: At rest)   Pulse 84   Temp 97.9 °F (36.6 °C)   Resp 20   Ht 5' 11\" (1.803 m)   Wt 213 lb 13.5 oz (97 kg)   SpO2 91%   Breastfeeding No   BMI 29.83 kg/m²     General appearance: alert, cooperative, no distress, appears stated age  Neck: supple, symmetrical, trachea midline, no adenopathy, thyroid: not enlarged, symmetric, no tenderness/mass/nodules, no carotid bruit and no JVD  Lungs: clear to auscultation bilaterally  Heart: regular rate and rhythm, S1, S2 normal, no murmur, click, rub or gallop  Abdomen: soft, non-tender. Bowel sounds normal. No masses,  no organomegaly  Extremities: extremities normal, atraumatic, no cyanosis or edema    Assessment:     Active Problems:    Hypertension (10/3/2014)      Sickle cell crisis ([facility] [address] (5/22/2017)      Class 1 obesity due to excess calories with serious comorbidity and body mass index (BMI) of 30.0 to 30.9 in adult (8/13/2019)        Plan:     1. Continue treatment of painful crises. Improvement is slow. 2.  We will mobilize.

## 2021-01-24 PROCEDURE — 74011250636 HC RX REV CODE- 250/636: Performed by: HOSPITALIST

## 2021-01-24 PROCEDURE — 74011000258 HC RX REV CODE- 258: Performed by: INTERNAL MEDICINE

## 2021-01-24 PROCEDURE — 74011250636 HC RX REV CODE- 250/636: Performed by: INTERNAL MEDICINE

## 2021-01-24 PROCEDURE — 74011250637 HC RX REV CODE- 250/637: Performed by: HOSPITALIST

## 2021-01-24 PROCEDURE — 65660000000 HC RM CCU STEPDOWN

## 2021-01-24 RX ADMIN — HYDROMORPHONE HYDROCHLORIDE 1 MG: 1 INJECTION, SOLUTION INTRAMUSCULAR; INTRAVENOUS; SUBCUTANEOUS at 11:05

## 2021-01-24 RX ADMIN — PANTOPRAZOLE SODIUM 40 MG: 40 TABLET, DELAYED RELEASE ORAL at 17:36

## 2021-01-24 RX ADMIN — HYDROMORPHONE HYDROCHLORIDE 1 MG: 1 INJECTION, SOLUTION INTRAMUSCULAR; INTRAVENOUS; SUBCUTANEOUS at 14:27

## 2021-01-24 RX ADMIN — ONDANSETRON 4 MG: 2 INJECTION INTRAMUSCULAR; INTRAVENOUS at 20:34

## 2021-01-24 RX ADMIN — CITALOPRAM HYDROBROMIDE 40 MG: 20 TABLET ORAL at 17:36

## 2021-01-24 RX ADMIN — HYDROMORPHONE HYDROCHLORIDE 1 MG: 1 INJECTION, SOLUTION INTRAMUSCULAR; INTRAVENOUS; SUBCUTANEOUS at 17:36

## 2021-01-24 RX ADMIN — HYDROMORPHONE HYDROCHLORIDE 1 MG: 1 INJECTION, SOLUTION INTRAMUSCULAR; INTRAVENOUS; SUBCUTANEOUS at 08:15

## 2021-01-24 RX ADMIN — POLYETHYLENE GLYCOL 3350 17 G: 17 POWDER, FOR SOLUTION ORAL at 08:25

## 2021-01-24 RX ADMIN — HYDROXYUREA 500 MG: 500 CAPSULE ORAL at 08:15

## 2021-01-24 RX ADMIN — HYDROMORPHONE HYDROCHLORIDE 1 MG: 1 INJECTION, SOLUTION INTRAMUSCULAR; INTRAVENOUS; SUBCUTANEOUS at 01:25

## 2021-01-24 RX ADMIN — DOCUSATE SODIUM 100 MG: 100 CAPSULE, LIQUID FILLED ORAL at 17:36

## 2021-01-24 RX ADMIN — DIPHENHYDRAMINE HYDROCHLORIDE 12.5 MG: 50 INJECTION, SOLUTION INTRAMUSCULAR; INTRAVENOUS at 17:36

## 2021-01-24 RX ADMIN — POTASSIUM CHLORIDE: 2 INJECTION, SOLUTION, CONCENTRATE INTRAVENOUS at 08:25

## 2021-01-24 RX ADMIN — DIPHENHYDRAMINE HYDROCHLORIDE 12.5 MG: 50 INJECTION, SOLUTION INTRAMUSCULAR; INTRAVENOUS at 23:34

## 2021-01-24 RX ADMIN — Medication 10 ML: at 05:27

## 2021-01-24 RX ADMIN — LOSARTAN POTASSIUM 50 MG: 50 TABLET, FILM COATED ORAL at 21:20

## 2021-01-24 RX ADMIN — FOLIC ACID 1 MG: 1 TABLET ORAL at 08:15

## 2021-01-24 RX ADMIN — ONDANSETRON 4 MG: 2 INJECTION INTRAMUSCULAR; INTRAVENOUS at 08:15

## 2021-01-24 RX ADMIN — ENOXAPARIN SODIUM 40 MG: 40 INJECTION SUBCUTANEOUS at 08:15

## 2021-01-24 RX ADMIN — HYDROXYUREA 500 MG: 500 CAPSULE ORAL at 17:36

## 2021-01-24 RX ADMIN — DOCUSATE SODIUM 100 MG: 100 CAPSULE, LIQUID FILLED ORAL at 08:15

## 2021-01-24 RX ADMIN — POTASSIUM CHLORIDE: 2 INJECTION, SOLUTION, CONCENTRATE INTRAVENOUS at 21:21

## 2021-01-24 RX ADMIN — DIPHENHYDRAMINE HYDROCHLORIDE 12.5 MG: 50 INJECTION, SOLUTION INTRAMUSCULAR; INTRAVENOUS at 11:05

## 2021-01-24 RX ADMIN — Medication 10 ML: at 20:34

## 2021-01-24 RX ADMIN — DIPHENHYDRAMINE HYDROCHLORIDE 12.5 MG: 50 INJECTION, SOLUTION INTRAMUSCULAR; INTRAVENOUS at 05:28

## 2021-01-24 RX ADMIN — HYDROMORPHONE HYDROCHLORIDE 1 MG: 1 INJECTION, SOLUTION INTRAMUSCULAR; INTRAVENOUS; SUBCUTANEOUS at 23:34

## 2021-01-24 RX ADMIN — ONDANSETRON 4 MG: 2 INJECTION INTRAMUSCULAR; INTRAVENOUS at 01:25

## 2021-01-24 RX ADMIN — HYDROMORPHONE HYDROCHLORIDE 1 MG: 1 INJECTION, SOLUTION INTRAMUSCULAR; INTRAVENOUS; SUBCUTANEOUS at 20:34

## 2021-01-24 RX ADMIN — ONDANSETRON 4 MG: 2 INJECTION INTRAMUSCULAR; INTRAVENOUS at 14:27

## 2021-01-24 RX ADMIN — Medication 10 ML: at 14:27

## 2021-01-24 RX ADMIN — HYDROMORPHONE HYDROCHLORIDE 1 MG: 1 INJECTION, SOLUTION INTRAMUSCULAR; INTRAVENOUS; SUBCUTANEOUS at 05:27

## 2021-01-24 NOTE — PROGRESS NOTES
Bedside shift change report given to Miguelangel Leonard RN  (oncoming nurse) by Mustapha Cleveland (offgoing nurse).   Report included the following information SBAR, Intake/Output, MAR and Accordion

## 2021-01-24 NOTE — PROGRESS NOTES
End of Shift Note    Bedside shift change report given to Taylor Francis RN  (oncoming nurse) by Estefany Garcia RN (offgoing nurse). Report included the following information SBAR, Intake/Output, MAR and Accordion    Shift worked:  7p-7a   Shift summary and any significant changes:     pain medication given q3-4 hours for chronic pain. Concerns for physician to address:  None   Zone phone for oncoming shift:   [phone]     Patient Information  Nakul Grewal  64 y.o.  1/18/2021 11:21 AM by Connie James MD. Nakul Grewal was admitted from Home    Problem List  Patient Active Problem List    Diagnosis Date Noted    AVN of femur ([facility] [address] 01/25/2020     Priority: 3 - Three    Hypokalemia 01/25/2020     Priority: 3 - Three    Viral gastritis 07/06/2020    Dehydration 07/06/2020    UTI (urinary tract infection) 07/04/2020    Acute gastroenteritis 10/27/2019    Class 1 obesity due to excess calories with serious comorbidity and body mass index (BMI) of 30.0 to 30.9 in adult 08/13/2019    Sickle cell disease ([facility] [address] 02/10/2019    Overweight 02/07/2019    Sickle cell pain crisis ([facility] [address] 09/08/2018    Carpal tunnel syndrome of right wrist 06/12/2018    Pulsatile tinnitus 10/31/2017    Sickle cell crisis ([facility] [address] 05/22/2017    Depression 10/05/2014    Hypertension 10/03/2014    Venous insufficiency 10/03/2014     Past Medical History:   Diagnosis Date    Anemia     SC hemoglobinopathy    Chronic pain     Depression     Hypertension     Liver disease     hepatitis C; pt reports \"cured\"    Sickle cell disease ([facility] [address]        Core Measures:  CVA: No No  CHF:No No  PNA:No No    Activity:  Activity Level: Up ad erum  Number times ambulated in hallways past shift: 0  Number of times OOB to chair past shift: 0    Cardiac:   Cardiac Monitoring: Yes      Cardiac Rhythm: Normal sinus rhythm    Access:   Current line(s): PIV   Central Line? No Placement date  Reason Medically Necessary   PICC LINE?  No Placement date Reason Medically Necessary     Genitourinary:   Urinary status: voiding  Urinary Catheter? No Placement Date  Reason Medically Necessary     Respiratory:   O2 Device: Room air  Chronic home O2 use?: YES  Incentive spirometer at bedside: NO  Actual Volume (ml): 1000 ml    GI:  Last Bowel Movement Date: 01/22/21  Current diet:  DIET REGULAR  Passing flatus: YES  Tolerating current diet: YES  % Diet Eaten: 100 %    Pain Management:   Patient states pain is manageable on current regimen: YES    Skin:  Nuno Score: 22  Interventions: speciality bed and increase time out of bed    Patient Safety:  Fall Score: Total Score: 1  Interventions: bed/chair alarm and pt to call before getting OOB       DVT prophylaxis:  DVT prophylaxis Med- No  DVT prophylaxis SCD or SARA- No     Wounds: (If Applicable)  Wounds- No  Location     Active Consults:  IP CONSULT TO HOSPITALIST    Length of Stay:  Expected LOS: 2d 16h  Actual LOS: 6  Discharge Plan: No   Pt continues to c/o generalized pain, meds administered as ordered.  She denies chest pain or SOB, no distress noted      Sebastien Powers RN

## 2021-01-24 NOTE — PROGRESS NOTES
General Daily Progress Note    Admit Date: 1/18/2021    Subjective:     Patient continues to complain of pain but is improving..     Current Facility-Administered Medications   Medication Dose Route Frequency   • dextrose 5 % - 0.45% NaCl 1,000 mL with potassium chloride 30 mEq infusion   IntraVENous CONTINUOUS   • fentaNYL (DURAGESIC) 75 mcg/hr patch 1 Patch  1 Patch TransDERmal Q72H   • sodium chloride (NS) flush 5-40 mL  5-40 mL IntraVENous Q8H   • sodium chloride (NS) flush 5-40 mL  5-40 mL IntraVENous PRN   • acetaminophen (TYLENOL) tablet 650 mg  650 mg Oral Q6H PRN    Or   • acetaminophen (TYLENOL) suppository 650 mg  650 mg Rectal Q6H PRN   • polyethylene glycol (MIRALAX) packet 17 g  17 g Oral DAILY PRN   • promethazine (PHENERGAN) tablet 12.5 mg  12.5 mg Oral Q6H PRN    Or   • ondansetron (ZOFRAN) injection 4 mg  4 mg IntraVENous Q6H PRN   • enoxaparin (LOVENOX) injection 40 mg  40 mg SubCUTAneous DAILY   • docusate sodium (COLACE) capsule 100 mg  100 mg Oral BID   • HYDROmorphone (PF) (DILAUDID) injection 1 mg  1 mg IntraVENous Q3H PRN   • diphenhydrAMINE (BENADRYL) injection 12.5 mg  12.5 mg IntraVENous Q4H PRN   • folic acid (FOLVITE) tablet 1 mg  1 mg Oral DAILY   • citalopram (CELEXA) tablet 40 mg  40 mg Oral QPM   • hydroxyurea (HYDREA) chemo cap 500 mg  500 mg Oral BID   • pantoprazole (PROTONIX) tablet 40 mg  40 mg Oral ACD   • losartan (COZAAR) tablet 50 mg  50 mg Oral QHS   • naloxone (NARCAN) injection 0.4 mg  0.4 mg IntraVENous PRN        Review of Systems  A comprehensive review of systems was negative.    Objective:     Patient Vitals for the past 24 hrs:   BP Temp Pulse Resp SpO2   01/24/21 0813 (!) 185/84 98.7 °F (37.1 °C) 88 18 95 %   01/24/21 0400 (!) 159/86 98.5 °F (36.9 °C) 93 18 95 %   01/24/21 0000 (!) 185/85 98.7 °F (37.1 °C) 91 20 98 %   01/23/21 1500 133/64 99.2 °F (37.3 °C) 85 20 91 %   01/23/21 1146 120/69 99.2 °F (37.3 °C) 86 20 97 %     No intake/output data recorded.  01/22  1901 - 01/24 0700  In: 1000 [I.V.:1000]  Out: -     Physical Exam:   Visit Vitals  BP (!) 185/84 (BP 1 Location: Right arm, BP Patient Position: At rest)   Pulse 88   Temp 98.7 °F (37.1 °C)   Resp 18   Ht 5' 11\" (1.803 m)   Wt 213 lb 13.5 oz (97 kg)   SpO2 95%   Breastfeeding No   BMI 29.83 kg/m²     General appearance: alert, cooperative, no distress, appears stated age  Neck: supple, symmetrical, trachea midline, no adenopathy, thyroid: not enlarged, symmetric, no tenderness/mass/nodules, no carotid bruit and no JVD  Lungs: clear to auscultation bilaterally  Heart: regular rate and rhythm, S1, S2 normal, no murmur, click, rub or gallop  Abdomen: soft, non-tender. Bowel sounds normal. No masses,  no organomegaly  Extremities: extremities normal, atraumatic, no cyanosis or edema    Assessment:     Active Problems:    Hypertension (10/3/2014)      Sickle cell crisis ([facility] [address] (5/22/2017)      Class 1 obesity due to excess calories with serious comorbidity and body mass index (BMI) of 30.0 to 30.9 in adult (8/13/2019)        Plan:     1. Continue treatment of painful crises. We will begin to reduce analgesics tomorrow assuming all goes well. Hemoglobin remains reasonably stable. 2.  We will mobilize.

## 2021-01-24 NOTE — PROGRESS NOTES
Problem: Falls - Risk of  Goal: *Absence of Falls  Description: Document Stan Cease Fall Risk and appropriate interventions in the flowsheet.   Outcome: Progressing Towards Goal  Note: Fall Risk Interventions:            Medication Interventions: Teach patient to arise slowly, Patient to call before getting OOB         History of Falls Interventions: Door open when patient unattended         Problem: Patient Education: Go to Patient Education Activity  Goal: Patient/Family Education  Outcome: Progressing Towards Goal     Problem: Pain  Goal: *Control of Pain  Outcome: Progressing Towards Goal

## 2021-01-25 LAB
ALBUMIN SERPL-MCNC: 3.4 G/DL (ref 3.5–5)
ALBUMIN/GLOB SERPL: 0.7 {RATIO} (ref 1.1–2.2)
ALP SERPL-CCNC: 111 U/L (ref 45–117)
ALT SERPL-CCNC: 24 U/L (ref 12–78)
ANION GAP SERPL CALC-SCNC: 2 MMOL/L (ref 5–15)
AST SERPL-CCNC: 30 U/L (ref 15–37)
BASOPHILS # BLD: 0 K/UL (ref 0–0.1)
BASOPHILS NFR BLD: 0 % (ref 0–1)
BILIRUB SERPL-MCNC: 1 MG/DL (ref 0.2–1)
BLASTS NFR BLD MANUAL: 0 %
BUN SERPL-MCNC: 11 MG/DL (ref 6–20)
BUN/CREAT SERPL: 14 (ref 12–20)
CALCIUM SERPL-MCNC: 8.9 MG/DL (ref 8.5–10.1)
CHLORIDE SERPL-SCNC: 103 MMOL/L (ref 97–108)
CO2 SERPL-SCNC: 31 MMOL/L (ref 21–32)
CREAT SERPL-MCNC: 0.78 MG/DL (ref 0.55–1.02)
DIFFERENTIAL METHOD BLD: ABNORMAL
EOSINOPHIL # BLD: 0.1 K/UL (ref 0–0.4)
EOSINOPHIL NFR BLD: 2 % (ref 0–7)
ERYTHROCYTE [DISTWIDTH] IN BLOOD BY AUTOMATED COUNT: 14 % (ref 11.5–14.5)
GLOBULIN SER CALC-MCNC: 4.9 G/DL (ref 2–4)
GLUCOSE SERPL-MCNC: 85 MG/DL (ref 65–100)
HCT VFR BLD AUTO: 23.5 % (ref 35–47)
HGB BLD-MCNC: 8.1 G/DL (ref 11.5–16)
IMM GRANULOCYTES # BLD AUTO: 0 K/UL
IMM GRANULOCYTES NFR BLD AUTO: 0 %
LYMPHOCYTES # BLD: 3.1 K/UL (ref 0.8–3.5)
LYMPHOCYTES NFR BLD: 46 % (ref 12–49)
MCH RBC QN AUTO: 39.1 PG (ref 26–34)
MCHC RBC AUTO-ENTMCNC: 34.5 G/DL (ref 30–36.5)
MCV RBC AUTO: 113.5 FL (ref 80–99)
METAMYELOCYTES NFR BLD MANUAL: 0 %
MONOCYTES # BLD: 0.9 K/UL (ref 0–1)
MONOCYTES NFR BLD: 13 % (ref 5–13)
MYELOCYTES NFR BLD MANUAL: 0 %
NEUTS BAND NFR BLD MANUAL: 0 % (ref 0–6)
NEUTS SEG # BLD: 2.7 K/UL (ref 1.8–8)
NEUTS SEG NFR BLD: 39 % (ref 32–75)
NRBC # BLD: 0.19 K/UL (ref 0–0.01)
NRBC BLD-RTO: 2.8 PER 100 WBC
OTHER CELLS NFR BLD MANUAL: 0 %
PLATELET # BLD AUTO: 192 K/UL (ref 150–400)
PMV BLD AUTO: 11.9 FL (ref 8.9–12.9)
POTASSIUM SERPL-SCNC: 4.4 MMOL/L (ref 3.5–5.1)
PROMYELOCYTES NFR BLD MANUAL: 0 %
PROT SERPL-MCNC: 8.3 G/DL (ref 6.4–8.2)
RBC # BLD AUTO: 2.07 M/UL (ref 3.8–5.2)
RBC MORPH BLD: ABNORMAL
SODIUM SERPL-SCNC: 136 MMOL/L (ref 136–145)
WBC # BLD AUTO: 6.8 K/UL (ref 3.6–11)

## 2021-01-25 PROCEDURE — 74011250636 HC RX REV CODE- 250/636: Performed by: HOSPITALIST

## 2021-01-25 PROCEDURE — 74011250636 HC RX REV CODE- 250/636: Performed by: INTERNAL MEDICINE

## 2021-01-25 PROCEDURE — 36415 COLL VENOUS BLD VENIPUNCTURE: CPT

## 2021-01-25 PROCEDURE — 85027 COMPLETE CBC AUTOMATED: CPT

## 2021-01-25 PROCEDURE — 74011000258 HC RX REV CODE- 258: Performed by: INTERNAL MEDICINE

## 2021-01-25 PROCEDURE — 74011250637 HC RX REV CODE- 250/637: Performed by: HOSPITALIST

## 2021-01-25 PROCEDURE — 65660000000 HC RM CCU STEPDOWN

## 2021-01-25 PROCEDURE — 80053 COMPREHEN METABOLIC PANEL: CPT

## 2021-01-25 PROCEDURE — 74011250637 HC RX REV CODE- 250/637: Performed by: INTERNAL MEDICINE

## 2021-01-25 PROCEDURE — 94760 N-INVAS EAR/PLS OXIMETRY 1: CPT

## 2021-01-25 RX ORDER — HYDROMORPHONE HYDROCHLORIDE 1 MG/ML
1 INJECTION, SOLUTION INTRAMUSCULAR; INTRAVENOUS; SUBCUTANEOUS
Status: DISCONTINUED | OUTPATIENT
Start: 2021-01-25 | End: 2021-01-27

## 2021-01-25 RX ADMIN — DOCUSATE SODIUM 100 MG: 100 CAPSULE, LIQUID FILLED ORAL at 09:28

## 2021-01-25 RX ADMIN — DIPHENHYDRAMINE HYDROCHLORIDE 12.5 MG: 50 INJECTION, SOLUTION INTRAMUSCULAR; INTRAVENOUS at 21:55

## 2021-01-25 RX ADMIN — ONDANSETRON 4 MG: 2 INJECTION INTRAMUSCULAR; INTRAVENOUS at 17:36

## 2021-01-25 RX ADMIN — ONDANSETRON 4 MG: 2 INJECTION INTRAMUSCULAR; INTRAVENOUS at 09:28

## 2021-01-25 RX ADMIN — DOCUSATE SODIUM 100 MG: 100 CAPSULE, LIQUID FILLED ORAL at 17:37

## 2021-01-25 RX ADMIN — POLYETHYLENE GLYCOL 3350 17 G: 17 POWDER, FOR SOLUTION ORAL at 09:28

## 2021-01-25 RX ADMIN — HYDROMORPHONE HYDROCHLORIDE 1 MG: 1 INJECTION, SOLUTION INTRAMUSCULAR; INTRAVENOUS; SUBCUTANEOUS at 09:28

## 2021-01-25 RX ADMIN — Medication 10 ML: at 06:07

## 2021-01-25 RX ADMIN — DIPHENHYDRAMINE HYDROCHLORIDE 12.5 MG: 50 INJECTION, SOLUTION INTRAMUSCULAR; INTRAVENOUS at 06:07

## 2021-01-25 RX ADMIN — HYDROMORPHONE HYDROCHLORIDE 1 MG: 1 INJECTION, SOLUTION INTRAMUSCULAR; INTRAVENOUS; SUBCUTANEOUS at 02:32

## 2021-01-25 RX ADMIN — HYDROMORPHONE HYDROCHLORIDE 1 MG: 1 INJECTION, SOLUTION INTRAMUSCULAR; INTRAVENOUS; SUBCUTANEOUS at 21:55

## 2021-01-25 RX ADMIN — ENOXAPARIN SODIUM 40 MG: 40 INJECTION SUBCUTANEOUS at 09:28

## 2021-01-25 RX ADMIN — PANTOPRAZOLE SODIUM 40 MG: 40 TABLET, DELAYED RELEASE ORAL at 17:37

## 2021-01-25 RX ADMIN — DIPHENHYDRAMINE HYDROCHLORIDE 12.5 MG: 50 INJECTION, SOLUTION INTRAMUSCULAR; INTRAVENOUS at 13:28

## 2021-01-25 RX ADMIN — ONDANSETRON 4 MG: 2 INJECTION INTRAMUSCULAR; INTRAVENOUS at 02:33

## 2021-01-25 RX ADMIN — FOLIC ACID 1 MG: 1 TABLET ORAL at 09:28

## 2021-01-25 RX ADMIN — HYDROXYUREA 500 MG: 500 CAPSULE ORAL at 13:29

## 2021-01-25 RX ADMIN — CITALOPRAM HYDROBROMIDE 40 MG: 20 TABLET ORAL at 17:37

## 2021-01-25 RX ADMIN — HYDROMORPHONE HYDROCHLORIDE 1 MG: 1 INJECTION, SOLUTION INTRAMUSCULAR; INTRAVENOUS; SUBCUTANEOUS at 17:36

## 2021-01-25 RX ADMIN — LOSARTAN POTASSIUM 50 MG: 50 TABLET, FILM COATED ORAL at 21:58

## 2021-01-25 RX ADMIN — POTASSIUM CHLORIDE: 2 INJECTION, SOLUTION, CONCENTRATE INTRAVENOUS at 10:57

## 2021-01-25 RX ADMIN — Medication 5 ML: at 14:00

## 2021-01-25 RX ADMIN — HYDROMORPHONE HYDROCHLORIDE 1 MG: 1 INJECTION, SOLUTION INTRAMUSCULAR; INTRAVENOUS; SUBCUTANEOUS at 06:07

## 2021-01-25 RX ADMIN — HYDROMORPHONE HYDROCHLORIDE 1 MG: 1 INJECTION, SOLUTION INTRAMUSCULAR; INTRAVENOUS; SUBCUTANEOUS at 13:28

## 2021-01-25 RX ADMIN — Medication 10 ML: at 21:57

## 2021-01-25 NOTE — PROGRESS NOTES
General Daily Progress Note    Admit Date: 1/18/2021    Subjective:     Patient continues to complain of pain in back and legs but improving. Current Facility-Administered Medications   Medication Dose Route Frequency    HYDROmorphone (PF) (DILAUDID) injection 1 mg  1 mg IntraVENous Q4H PRN    dextrose 5 % - 0.45% NaCl 1,000 mL with potassium chloride 30 mEq infusion   IntraVENous CONTINUOUS    fentaNYL (DURAGESIC) 75 mcg/hr patch 1 Patch  1 Patch TransDERmal Q72H    sodium chloride (NS) flush 5-40 mL  5-40 mL IntraVENous Q8H    sodium chloride (NS) flush 5-40 mL  5-40 mL IntraVENous PRN    acetaminophen (TYLENOL) tablet 650 mg  650 mg Oral Q6H PRN    Or    acetaminophen (TYLENOL) suppository 650 mg  650 mg Rectal Q6H PRN    polyethylene glycol (MIRALAX) packet 17 g  17 g Oral DAILY PRN    promethazine (PHENERGAN) tablet 12.5 mg  12.5 mg Oral Q6H PRN    Or    ondansetron (ZOFRAN) injection 4 mg  4 mg IntraVENous Q6H PRN    enoxaparin (LOVENOX) injection 40 mg  40 mg SubCUTAneous DAILY    docusate sodium (COLACE) capsule 100 mg  100 mg Oral BID    diphenhydrAMINE (BENADRYL) injection 12.5 mg  12.5 mg IntraVENous I8B PRN    folic acid (FOLVITE) tablet 1 mg  1 mg Oral DAILY    citalopram (CELEXA) tablet 40 mg  40 mg Oral QPM    hydroxyurea (HYDREA) chemo cap 500 mg  500 mg Oral BID    pantoprazole (PROTONIX) tablet 40 mg  40 mg Oral ACD    losartan (COZAAR) tablet 50 mg  50 mg Oral QHS    naloxone (NARCAN) injection 0.4 mg  0.4 mg IntraVENous PRN        Review of Systems  A comprehensive review of systems was negative. Objective:     Patient Vitals for the past 24 hrs:   BP Temp Pulse Resp SpO2   01/25/21 0818     95 %   01/25/21 0751 (!) 144/77 99.1 °F (37.3 °C)  18 94 %   01/24/21 2338 (!) 197/91 99.5 °F (37.5 °C) (!) 104 18 96 %   01/24/21 2000 126/75 99.2 °F (37.3 °C) 93 18 95 %   01/24/21 1103 (!) 151/83 98.7 °F (37.1 °C) 91 18 95 %     No intake/output data recorded.   01/23 1901 - 01/25 0700  In: 2821.3 [I.V.:2821.3]  Out: -     Physical Exam:   Visit Vitals  BP (!) 144/77 (BP 1 Location: Right arm, BP Patient Position: At rest)   Pulse (!) 104   Temp 99.1 °F (37.3 °C)   Resp 18   Ht 5' 11\" (1.803 m)   Wt 213 lb 13.5 oz (97 kg)   SpO2 95%   Breastfeeding No   BMI 29.83 kg/m²     General appearance: alert, cooperative, no distress, appears stated age  Neck: supple, symmetrical, trachea midline, no adenopathy, thyroid: not enlarged, symmetric, no tenderness/mass/nodules, no carotid bruit and no JVD  Lungs: clear to auscultation bilaterally  Heart: regular rate and rhythm, S1, S2 normal, no murmur, click, rub or gallop  Abdomen: soft, non-tender. Bowel sounds normal. No masses,  no organomegaly  Extremities: extremities normal, atraumatic, no cyanosis or edema    Assessment:     Active Problems:    Hypertension (10/3/2014)      Sickle cell crisis ([facility] [address] (5/22/2017)      Class 1 obesity due to excess calories with serious comorbidity and body mass index (BMI) of 30.0 to 30.9 in adult (8/13/2019)        Plan:     1. Painful crises continues to improve. Dilaudid decreased. 2.  Continue to mobilize.

## 2021-01-25 NOTE — PROGRESS NOTES
End of Shift Note    Bedside shift change report given to Tiana Mota RN  (oncoming nurse) by Wilbur Felty, RN (offgoing nurse). Report included the following information SBAR, Intake/Output, MAR and Accordion    Shift worked:  7p-7a   Shift summary and any significant changes:     pain medication given q3-4 hours for chronic pain. Concerns for physician to address:  None   Zone phone for oncoming shift:   [phone]     Patient Information  Ricci Murillo  64 y.o.  1/18/2021 11:21 AM by Cedrick Overton MD. Ricci Murillo was admitted from Home    Problem List  Patient Active Problem List    Diagnosis Date Noted    AVN of femur ([facility] [address] 01/25/2020     Priority: 3 - Three    Hypokalemia 01/25/2020     Priority: 3 - Three    Viral gastritis 07/06/2020    Dehydration 07/06/2020    UTI (urinary tract infection) 07/04/2020    Acute gastroenteritis 10/27/2019    Class 1 obesity due to excess calories with serious comorbidity and body mass index (BMI) of 30.0 to 30.9 in adult 08/13/2019    Sickle cell disease ([facility] [address] 02/10/2019    Overweight 02/07/2019    Sickle cell pain crisis ([facility] [address] 09/08/2018    Carpal tunnel syndrome of right wrist 06/12/2018    Pulsatile tinnitus 10/31/2017    Sickle cell crisis ([facility] [address] 05/22/2017    Depression 10/05/2014    Hypertension 10/03/2014    Venous insufficiency 10/03/2014     Past Medical History:   Diagnosis Date    Anemia     SC hemoglobinopathy    Chronic pain     Depression     Hypertension     Liver disease     hepatitis C; pt reports \"cured\"    Sickle cell disease ([facility] [address]        Core Measures:  CVA: No No  CHF:No No  PNA:No No    Activity:  Activity Level: Up ad erum  Number times ambulated in hallways past shift: 0  Number of times OOB to chair past shift: 0    Cardiac:   Cardiac Monitoring: Yes      Cardiac Rhythm: Normal sinus rhythm    Access:   Current line(s): PIV   Central Line? No Placement date  Reason Medically Necessary   PICC LINE?  No Placement date Reason Medically Necessary     Genitourinary:   Urinary status: voiding  Urinary Catheter? No Placement Date  Reason Medically Necessary     Respiratory:   O2 Device: Room air  Chronic home O2 use?: YES  Incentive spirometer at bedside: NO  Actual Volume (ml): 1000 ml    GI:  Last Bowel Movement Date: 01/17/21  Current diet:  DIET REGULAR  Passing flatus: YES  Tolerating current diet: YES  % Diet Eaten: 100 %    Pain Management:   Patient states pain is manageable on current regimen: YES    Skin:  Nuno Score: 23  Interventions: speciality bed and increase time out of bed    Patient Safety:  Fall Score: Total Score: 1  Interventions: bed/chair alarm and pt to call before getting OOB       DVT prophylaxis:  DVT prophylaxis Med- No  DVT prophylaxis SCD or SARA- No     Wounds: (If Applicable)  Wounds- No  Location     Active Consults:  IP CONSULT TO HOSPITALIST    Length of Stay:  Expected LOS: 2d 16h  Actual LOS: 7  Discharge Plan: No   Pt continues to c/o generalized pain, meds administered as ordered.  She denies chest pain or SOB, no distress noted      Sebastien Powers RN

## 2021-01-25 NOTE — PROGRESS NOTES
Bedside shift change report given to SAMIR Dorsey  (oncoming nurse) by Angie (offgoing nurse).   Report included the following information SBAR, Intake/Output, MAR and Accordion

## 2021-01-25 NOTE — PROGRESS NOTES
Spiritual Care Assessment/Progress Note  [facility]      NAME: Rajinder Curran      MRN: [id number]  AGE: 64 y.o.  SEX: female  Adventist Affiliation: Non Mormon   Language: English     1/25/2021     Total Time (in minutes): 14     Spiritual Assessment begun in [facility] TELE[facility] through conversation with:         [x]Patient        [] Family    [] Friend(s)        Reason for Consult: Initial/Spiritual assessment, patient floor     Spiritual beliefs: (Please include comment if needed)     [x] Identifies with a zay tradition:         [] Supported by a zay community:            [] Claims no spiritual orientation:           [] Seeking spiritual identity:                [] Adheres to an individual form of spirituality:           [] Not able to assess:                           Identified resources for coping:      [x] Prayer                               [] Music                  [] Guided Imagery     [] Family/friends                 [] Pet visits     [] Devotional reading                         [] Unknown     [] Other                                           Interventions offered during this visit: (See comments for more details)    Patient Interventions: Affirmation of emotions/emotional suffering, Affirmation of zay, Catharsis/review of pertinent events in supportive environment, Iconic (affirming the presence of God/Higher Power), Initial/Spiritual assessment, patient floor, Normalization of emotional/spiritual concerns, Prayer (actual), Adventist beliefs/image of God discussed           Plan of Care:     [] Support spiritual and/or cultural needs    [] Support AMD and/or advance care planning process      [] Support grieving process   [] Coordinate Rites and/or Rituals    [] Coordination with community clergy   [x] No other spiritual needs identified at this time   [] Detailed Plan of Care below (See Comments)  [] Make referral to Music Therapy  [] Make referral to Pet Therapy     [] Make referral to Addiction services  [] Make referral to [facility]  [] Make referral to Spiritual Care Partner  [] No future visits requested        [] Follow up upon further referrals     Comments:  Initial visit on Neuro for spiritual assessment. No family/friends present. Patient was sitting up on edge of bed beginning to have her lunch. She was welcoming of visit. Provided bedside presence and supportive listening as she shared her current medical concerns; she noted she is feeling better than when she arrived. She expressed no spiritual concerns at this time, but requested prayer. Shared prayer with patient and advised her of ongoing availability of pastoral support.      ROGE Malik, [address], Staff [address] [place] [address] Avenue    [address] Paging Service  [phone]

## 2021-01-25 NOTE — PROGRESS NOTES
Problem: Falls - Risk of  Goal: *Absence of Falls  Description: Document Clydene Bone Fall Risk and appropriate interventions in the flowsheet.   Outcome: Progressing Towards Goal  Note: Fall Risk Interventions:            Medication Interventions: Teach patient to arise slowly         History of Falls Interventions: Bed/chair exit alarm         Problem: Patient Education: Go to Patient Education Activity  Goal: Patient/Family Education  Outcome: Progressing Towards Goal     Problem: Pain  Goal: *Control of Pain  Outcome: Progressing Towards Goal

## 2021-01-26 LAB
ANION GAP SERPL CALC-SCNC: 4 MMOL/L (ref 5–15)
BASOPHILS # BLD: 0 K/UL (ref 0–0.1)
BASOPHILS NFR BLD: 0 % (ref 0–1)
BLASTS NFR BLD MANUAL: 0 %
BUN SERPL-MCNC: 9 MG/DL (ref 6–20)
BUN/CREAT SERPL: 12 (ref 12–20)
CALCIUM SERPL-MCNC: 8.6 MG/DL (ref 8.5–10.1)
CHLORIDE SERPL-SCNC: 104 MMOL/L (ref 97–108)
CO2 SERPL-SCNC: 28 MMOL/L (ref 21–32)
CREAT SERPL-MCNC: 0.77 MG/DL (ref 0.55–1.02)
DIFFERENTIAL METHOD BLD: ABNORMAL
EOSINOPHIL # BLD: 0.1 K/UL (ref 0–0.4)
EOSINOPHIL NFR BLD: 1 % (ref 0–7)
ERYTHROCYTE [DISTWIDTH] IN BLOOD BY AUTOMATED COUNT: 13.8 % (ref 11.5–14.5)
GLUCOSE SERPL-MCNC: 93 MG/DL (ref 65–100)
HCT VFR BLD AUTO: 23.3 % (ref 35–47)
HGB BLD-MCNC: 8.2 G/DL (ref 11.5–16)
IMM GRANULOCYTES # BLD AUTO: 0 K/UL
IMM GRANULOCYTES NFR BLD AUTO: 0 %
LYMPHOCYTES # BLD: 2 K/UL (ref 0.8–3.5)
LYMPHOCYTES NFR BLD: 30 % (ref 12–49)
MCH RBC QN AUTO: 39.6 PG (ref 26–34)
MCHC RBC AUTO-ENTMCNC: 35.2 G/DL (ref 30–36.5)
MCV RBC AUTO: 112.6 FL (ref 80–99)
METAMYELOCYTES NFR BLD MANUAL: 0 %
MONOCYTES # BLD: 0.9 K/UL (ref 0–1)
MONOCYTES NFR BLD: 14 % (ref 5–13)
MYELOCYTES NFR BLD MANUAL: 0 %
NEUTS BAND NFR BLD MANUAL: 0 % (ref 0–6)
NEUTS SEG # BLD: 3.7 K/UL (ref 1.8–8)
NEUTS SEG NFR BLD: 55 % (ref 32–75)
NRBC # BLD: 0.28 K/UL (ref 0–0.01)
NRBC BLD-RTO: 4.2 PER 100 WBC
OTHER CELLS NFR BLD MANUAL: 0 %
PLATELET # BLD AUTO: 208 K/UL (ref 150–400)
PMV BLD AUTO: 11.9 FL (ref 8.9–12.9)
POTASSIUM SERPL-SCNC: 4.4 MMOL/L (ref 3.5–5.1)
PROMYELOCYTES NFR BLD MANUAL: 0 %
RBC # BLD AUTO: 2.07 M/UL (ref 3.8–5.2)
RBC MORPH BLD: ABNORMAL
SODIUM SERPL-SCNC: 136 MMOL/L (ref 136–145)
WBC # BLD AUTO: 6.7 K/UL (ref 3.6–11)

## 2021-01-26 PROCEDURE — 74011250637 HC RX REV CODE- 250/637: Performed by: HOSPITALIST

## 2021-01-26 PROCEDURE — 74011000258 HC RX REV CODE- 258: Performed by: INTERNAL MEDICINE

## 2021-01-26 PROCEDURE — 85027 COMPLETE CBC AUTOMATED: CPT

## 2021-01-26 PROCEDURE — 80048 BASIC METABOLIC PNL TOTAL CA: CPT

## 2021-01-26 PROCEDURE — 74011250636 HC RX REV CODE- 250/636: Performed by: HOSPITALIST

## 2021-01-26 PROCEDURE — 74011250636 HC RX REV CODE- 250/636: Performed by: INTERNAL MEDICINE

## 2021-01-26 PROCEDURE — 65660000000 HC RM CCU STEPDOWN

## 2021-01-26 PROCEDURE — 36415 COLL VENOUS BLD VENIPUNCTURE: CPT

## 2021-01-26 RX ADMIN — HYDROMORPHONE HYDROCHLORIDE 1 MG: 1 INJECTION, SOLUTION INTRAMUSCULAR; INTRAVENOUS; SUBCUTANEOUS at 18:00

## 2021-01-26 RX ADMIN — FOLIC ACID 1 MG: 1 TABLET ORAL at 09:22

## 2021-01-26 RX ADMIN — ONDANSETRON 4 MG: 2 INJECTION INTRAMUSCULAR; INTRAVENOUS at 18:00

## 2021-01-26 RX ADMIN — HYDROMORPHONE HYDROCHLORIDE 1 MG: 1 INJECTION, SOLUTION INTRAMUSCULAR; INTRAVENOUS; SUBCUTANEOUS at 05:18

## 2021-01-26 RX ADMIN — Medication 5 ML: at 14:00

## 2021-01-26 RX ADMIN — HYDROMORPHONE HYDROCHLORIDE 1 MG: 1 INJECTION, SOLUTION INTRAMUSCULAR; INTRAVENOUS; SUBCUTANEOUS at 01:08

## 2021-01-26 RX ADMIN — DOCUSATE SODIUM 100 MG: 100 CAPSULE, LIQUID FILLED ORAL at 18:00

## 2021-01-26 RX ADMIN — HYDROXYUREA 500 MG: 500 CAPSULE ORAL at 18:32

## 2021-01-26 RX ADMIN — HYDROMORPHONE HYDROCHLORIDE 1 MG: 1 INJECTION, SOLUTION INTRAMUSCULAR; INTRAVENOUS; SUBCUTANEOUS at 13:38

## 2021-01-26 RX ADMIN — DOCUSATE SODIUM 100 MG: 100 CAPSULE, LIQUID FILLED ORAL at 09:22

## 2021-01-26 RX ADMIN — PANTOPRAZOLE SODIUM 40 MG: 40 TABLET, DELAYED RELEASE ORAL at 18:34

## 2021-01-26 RX ADMIN — ONDANSETRON 4 MG: 2 INJECTION INTRAMUSCULAR; INTRAVENOUS at 09:21

## 2021-01-26 RX ADMIN — HYDROXYUREA 500 MG: 500 CAPSULE ORAL at 09:29

## 2021-01-26 RX ADMIN — ENOXAPARIN SODIUM 40 MG: 40 INJECTION SUBCUTANEOUS at 09:21

## 2021-01-26 RX ADMIN — ONDANSETRON 4 MG: 2 INJECTION INTRAMUSCULAR; INTRAVENOUS at 01:08

## 2021-01-26 RX ADMIN — DIPHENHYDRAMINE HYDROCHLORIDE 12.5 MG: 50 INJECTION, SOLUTION INTRAMUSCULAR; INTRAVENOUS at 22:26

## 2021-01-26 RX ADMIN — DIPHENHYDRAMINE HYDROCHLORIDE 12.5 MG: 50 INJECTION, SOLUTION INTRAMUSCULAR; INTRAVENOUS at 13:39

## 2021-01-26 RX ADMIN — HYDROMORPHONE HYDROCHLORIDE 1 MG: 1 INJECTION, SOLUTION INTRAMUSCULAR; INTRAVENOUS; SUBCUTANEOUS at 22:26

## 2021-01-26 RX ADMIN — Medication 10 ML: at 05:21

## 2021-01-26 RX ADMIN — Medication 10 ML: at 23:27

## 2021-01-26 RX ADMIN — HYDROMORPHONE HYDROCHLORIDE 1 MG: 1 INJECTION, SOLUTION INTRAMUSCULAR; INTRAVENOUS; SUBCUTANEOUS at 09:21

## 2021-01-26 RX ADMIN — LOSARTAN POTASSIUM 50 MG: 50 TABLET, FILM COATED ORAL at 23:26

## 2021-01-26 RX ADMIN — CITALOPRAM HYDROBROMIDE 40 MG: 20 TABLET ORAL at 18:00

## 2021-01-26 RX ADMIN — POTASSIUM CHLORIDE: 2 INJECTION, SOLUTION, CONCENTRATE INTRAVENOUS at 01:09

## 2021-01-26 RX ADMIN — DIPHENHYDRAMINE HYDROCHLORIDE 12.5 MG: 50 INJECTION, SOLUTION INTRAMUSCULAR; INTRAVENOUS at 05:20

## 2021-01-26 RX ADMIN — POTASSIUM CHLORIDE: 2 INJECTION, SOLUTION, CONCENTRATE INTRAVENOUS at 18:02

## 2021-01-26 NOTE — PROGRESS NOTES
General Daily Progress Note    Admit Date: 1/18/2021    Subjective:     Patient continues to complain of pain but improving. .     Current Facility-Administered Medications   Medication Dose Route Frequency    HYDROmorphone (PF) (DILAUDID) injection 1 mg  1 mg IntraVENous Q4H PRN    dextrose 5 % - 0.45% NaCl 1,000 mL with potassium chloride 30 mEq infusion   IntraVENous CONTINUOUS    fentaNYL (DURAGESIC) 75 mcg/hr patch 1 Patch  1 Patch TransDERmal Q72H    sodium chloride (NS) flush 5-40 mL  5-40 mL IntraVENous Q8H    sodium chloride (NS) flush 5-40 mL  5-40 mL IntraVENous PRN    acetaminophen (TYLENOL) tablet 650 mg  650 mg Oral Q6H PRN    Or    acetaminophen (TYLENOL) suppository 650 mg  650 mg Rectal Q6H PRN    polyethylene glycol (MIRALAX) packet 17 g  17 g Oral DAILY PRN    promethazine (PHENERGAN) tablet 12.5 mg  12.5 mg Oral Q6H PRN    Or    ondansetron (ZOFRAN) injection 4 mg  4 mg IntraVENous Q6H PRN    enoxaparin (LOVENOX) injection 40 mg  40 mg SubCUTAneous DAILY    docusate sodium (COLACE) capsule 100 mg  100 mg Oral BID    diphenhydrAMINE (BENADRYL) injection 12.5 mg  12.5 mg IntraVENous L6A PRN    folic acid (FOLVITE) tablet 1 mg  1 mg Oral DAILY    citalopram (CELEXA) tablet 40 mg  40 mg Oral QPM    hydroxyurea (HYDREA) chemo cap 500 mg  500 mg Oral BID    pantoprazole (PROTONIX) tablet 40 mg  40 mg Oral ACD    losartan (COZAAR) tablet 50 mg  50 mg Oral QHS    naloxone (NARCAN) injection 0.4 mg  0.4 mg IntraVENous PRN        Review of Systems  A comprehensive review of systems was negative. Objective:     Patient Vitals for the past 24 hrs:   BP Temp Pulse Resp SpO2   01/26/21 0400 (!) 163/91 98.8 °F (37.1 °C) 98 16 94 %   01/26/21 0021 138/78 98.8 °F (37.1 °C) 90 16 92 %   01/25/21 1934 (!) 115/59 98.1 °F (36.7 °C) 87 16 97 %   01/25/21 1458 136/67 98.1 °F (36.7 °C) 89 16 94 %   01/25/21 1153 137/77 99.2 °F (37.3 °C) 78 18 96 %     No intake/output data recorded.   01/24 1901 - 01/26 0700  In: 2843.8 [I.V.:2843.8]  Out: -     Physical Exam:   Visit Vitals  BP (!) 163/91 (BP 1 Location: Right arm, BP Patient Position: At rest)   Pulse 98   Temp 98.8 °F (37.1 °C)   Resp 16   Ht 5' 11\" (1.803 m)   Wt 213 lb 13.5 oz (97 kg)   SpO2 94%   Breastfeeding No   BMI 29.83 kg/m²     General appearance: alert, cooperative, no distress, appears stated age  Neck: supple, symmetrical, trachea midline, no adenopathy, thyroid: not enlarged, symmetric, no tenderness/mass/nodules, no carotid bruit and no JVD  Lungs: clear to auscultation bilaterally  Heart: regular rate and rhythm, S1, S2 normal, no murmur, click, rub or gallop  Abdomen: soft, non-tender. Bowel sounds normal. No masses,  no organomegaly  Extremities: extremities normal, atraumatic, no cyanosis or edema    Assessment:     Active Problems:    Hypertension (10/3/2014)      Sickle cell crisis ([facility] [address] (5/22/2017)      Class 1 obesity due to excess calories with serious comorbidity and body mass index (BMI) of 30.0 to 30.9 in adult (8/13/2019)        Plan:     1. Continue treatment of painful crises. No adjustment in analgesics today. 2.  Continue to mobilize. 3.  Blood pressure acceptable.

## 2021-01-26 NOTE — PROGRESS NOTES
Comprehensive Nutrition Assessment    Type and Reason for Visit: Reassess    Nutrition Recommendations/Plan:   Continue regular diet     Nutrition Assessment:     Patient medically noted for sickle cell pain crises and HTN. PMH Depression and GERD. Chart reviewed for length of stay. Visited patient at bedside. She reports a good appetite and eating well. Meals have been good. Provided her with menu. No nutrition questions or concerns at this time. Encouraged PO intake. Estimated Daily Nutrient Needs:  Energy (kcal): 1940 kcal (BMR 1646 x 1. 3AF -200kcal); Weight Used for Energy Requirements: Current  Protein (g): 78-97g (0.8-1.0 g/kg bw); Weight Used for Protein Requirements: Current  Fluid (ml/day): 1950 mL; Method Used for Fluid Requirements: 1 ml/kcal    Nutrition Related Findings:       Celexa, Colace, Folic Acid, Protonix, X3% IVF  BM 1/25    Wounds:    None       Current Nutrition Therapies:  DIET REGULAR    Anthropometric Measures:  · Height:  5' 11\" (180.3 cm)  · Current Body Wt:  97 kg (213 lb 13.5 oz)   · BMI Category: Overweight (BMI 25.0-29. 9)       Nutrition Diagnosis:   No nutrition diagnosis at this time     Nutrition Interventions:   Food and/or Nutrient Delivery: Continue current diet  Nutrition Education and Counseling: No recommendations at this time  Coordination of Nutrition Care: No recommendation at this time    Goals:  PO intake >75% of meals/supplements next 5-7 days       Nutrition Monitoring and Evaluation:   Behavioral-Environmental Outcomes: None identified  Food/Nutrient Intake Outcomes: Food and nutrient intake  Physical Signs/Symptoms Outcomes: Biochemical data, Weight    Discharge Planning:    Continue current diet     Electronically signed by Gonzalo Hunt RD on 1/26/2021 at 2:41 PM    Contact: ext [phone]

## 2021-01-26 NOTE — PROGRESS NOTES
KRISTINA Plan:    * Home with f/u apts (once medically stable)    > CM to secure PCP f/u apt prior to d/c  > 2nd IM prior to d/c    Initial note: CM reviewed pt's chart prior to moving forward with d/c planning. Per chart review, pt will d/c home with f/u apts once medically stable. CM will continue to follow & remain accessible for d/c planning. CM entered delay in d/c to reflect the current barriers preventing [facility]     Anders De La Rosa, MSW  Care Manager, [address] [facility]

## 2021-01-26 NOTE — PROGRESS NOTES
End of Shift Note    Bedside shift change report given to Kenny Kramer RN  (oncoming nurse) by Yocasta Briseno RN (offgoing nurse). Report included the following information SBAR, Intake/Output, MAR and Accordion    Shift worked:  7p-7a   Shift summary and any significant changes:     pain medication given q3-4 hours for chronic pain. Concerns for physician to address:  None   Zone phone for oncoming shift:   [phone]     Patient Information  Noy Krueger  64 y.o.  1/18/2021 11:21 AM by Dayana Potter MD. Noy Krueger was admitted from Home    Problem List  Patient Active Problem List    Diagnosis Date Noted    AVN of femur ([facility] [address] 01/25/2020     Priority: 3 - Three    Hypokalemia 01/25/2020     Priority: 3 - Three    Viral gastritis 07/06/2020    Dehydration 07/06/2020    UTI (urinary tract infection) 07/04/2020    Acute gastroenteritis 10/27/2019    Class 1 obesity due to excess calories with serious comorbidity and body mass index (BMI) of 30.0 to 30.9 in adult 08/13/2019    Sickle cell disease ([facility] [address] 02/10/2019    Overweight 02/07/2019    Sickle cell pain crisis ([facility] [address] 09/08/2018    Carpal tunnel syndrome of right wrist 06/12/2018    Pulsatile tinnitus 10/31/2017    Sickle cell crisis ([facility] [address] 05/22/2017    Depression 10/05/2014    Hypertension 10/03/2014    Venous insufficiency 10/03/2014     Past Medical History:   Diagnosis Date    Anemia     SC hemoglobinopathy    Chronic pain     Depression     Hypertension     Liver disease     hepatitis C; pt reports \"cured\"    Sickle cell disease ([facility] [address]        Core Measures:  CVA: No No  CHF:No No  PNA:No No    Activity:  Activity Level: Up ad erum  Number times ambulated in hallways past shift: 0  Number of times OOB to chair past shift: 0    Cardiac:   Cardiac Monitoring: Yes      Cardiac Rhythm: Normal sinus rhythm    Access:   Current line(s): PIV   Central Line? No Placement date  Reason Medically Necessary   PICC LINE?  No Placement date Reason Medically Necessary     Genitourinary:   Urinary status: voiding  Urinary Catheter? No Placement Date  Reason Medically Necessary     Respiratory:   O2 Device: Room air  Chronic home O2 use?: YES  Incentive spirometer at bedside: NO  Actual Volume (ml): 1000 ml    GI:  Last Bowel Movement Date: 01/25/21(had lg bm on day shift per pt.)  Current diet:  DIET REGULAR  Passing flatus: YES  Tolerating current diet: YES  % Diet Eaten: 100 %    Pain Management:   Patient states pain is manageable on current regimen: YES    Skin:  Nuno Score: 23  Interventions: speciality bed and increase time out of bed    Patient Safety:  Fall Score: Total Score: 1  Interventions: bed/chair alarm and pt to call before getting OOB       DVT prophylaxis:  DVT prophylaxis Med- No  DVT prophylaxis SCD or SARA- No     Wounds: (If Applicable)  Wounds- No  Location     Active Consults:  IP CONSULT TO HOSPITALIST    Length of Stay:  Expected LOS: 2d 16h  Actual LOS: 8  Discharge Plan: No   Pt continues to c/o generalized pain, meds administered as ordered.  She denies chest pain or SOB, no distress noted      Anthony Multani RN

## 2021-01-26 NOTE — PROGRESS NOTES
Problem: Falls - Risk of  Goal: *Absence of Falls  Description: Document [place] Reason Fall Risk and appropriate interventions in the flowsheet.   Outcome: Progressing Towards Goal  Note: Fall Risk Interventions:            Medication Interventions: Teach patient to arise slowly         History of Falls Interventions: Bed/chair exit alarm         Problem: Patient Education: Go to Patient Education Activity  Goal: Patient/Family Education  Outcome: Progressing Towards Goal     Problem: Pain  Goal: *Control of Pain  Outcome: Progressing Towards Goal

## 2021-01-27 PROCEDURE — 74011250636 HC RX REV CODE- 250/636: Performed by: HOSPITALIST

## 2021-01-27 PROCEDURE — 74011250636 HC RX REV CODE- 250/636: Performed by: INTERNAL MEDICINE

## 2021-01-27 PROCEDURE — 74011000258 HC RX REV CODE- 258: Performed by: INTERNAL MEDICINE

## 2021-01-27 PROCEDURE — 74011250637 HC RX REV CODE- 250/637: Performed by: HOSPITALIST

## 2021-01-27 PROCEDURE — 65660000000 HC RM CCU STEPDOWN

## 2021-01-27 RX ORDER — HYDROMORPHONE HYDROCHLORIDE 1 MG/ML
1 INJECTION, SOLUTION INTRAMUSCULAR; INTRAVENOUS; SUBCUTANEOUS
Status: DISCONTINUED | OUTPATIENT
Start: 2021-01-28 | End: 2021-01-28

## 2021-01-27 RX ADMIN — PANTOPRAZOLE SODIUM 40 MG: 40 TABLET, DELAYED RELEASE ORAL at 17:46

## 2021-01-27 RX ADMIN — CITALOPRAM HYDROBROMIDE 40 MG: 20 TABLET ORAL at 17:45

## 2021-01-27 RX ADMIN — HYDROMORPHONE HYDROCHLORIDE 1 MG: 1 INJECTION, SOLUTION INTRAMUSCULAR; INTRAVENOUS; SUBCUTANEOUS at 22:15

## 2021-01-27 RX ADMIN — FOLIC ACID 1 MG: 1 TABLET ORAL at 08:22

## 2021-01-27 RX ADMIN — POTASSIUM CHLORIDE: 2 INJECTION, SOLUTION, CONCENTRATE INTRAVENOUS at 10:52

## 2021-01-27 RX ADMIN — HYDROXYUREA 500 MG: 500 CAPSULE ORAL at 18:12

## 2021-01-27 RX ADMIN — DIPHENHYDRAMINE HYDROCHLORIDE 12.5 MG: 50 INJECTION, SOLUTION INTRAMUSCULAR; INTRAVENOUS at 08:20

## 2021-01-27 RX ADMIN — ONDANSETRON 4 MG: 2 INJECTION INTRAMUSCULAR; INTRAVENOUS at 03:30

## 2021-01-27 RX ADMIN — HYDROMORPHONE HYDROCHLORIDE 1 MG: 1 INJECTION, SOLUTION INTRAMUSCULAR; INTRAVENOUS; SUBCUTANEOUS at 18:12

## 2021-01-27 RX ADMIN — HYDROXYUREA 500 MG: 500 CAPSULE ORAL at 12:11

## 2021-01-27 RX ADMIN — Medication 10 ML: at 22:17

## 2021-01-27 RX ADMIN — PROMETHAZINE HYDROCHLORIDE 12.5 MG: 25 TABLET ORAL at 22:15

## 2021-01-27 RX ADMIN — DIPHENHYDRAMINE HYDROCHLORIDE 12.5 MG: 50 INJECTION, SOLUTION INTRAMUSCULAR; INTRAVENOUS at 14:13

## 2021-01-27 RX ADMIN — HYDROMORPHONE HYDROCHLORIDE 1 MG: 1 INJECTION, SOLUTION INTRAMUSCULAR; INTRAVENOUS; SUBCUTANEOUS at 03:30

## 2021-01-27 RX ADMIN — DIPHENHYDRAMINE HYDROCHLORIDE 12.5 MG: 50 INJECTION, SOLUTION INTRAMUSCULAR; INTRAVENOUS at 22:14

## 2021-01-27 RX ADMIN — DOCUSATE SODIUM 100 MG: 100 CAPSULE, LIQUID FILLED ORAL at 17:45

## 2021-01-27 RX ADMIN — HYDROMORPHONE HYDROCHLORIDE 1 MG: 1 INJECTION, SOLUTION INTRAMUSCULAR; INTRAVENOUS; SUBCUTANEOUS at 08:23

## 2021-01-27 RX ADMIN — Medication 10 ML: at 06:15

## 2021-01-27 RX ADMIN — LOSARTAN POTASSIUM 50 MG: 50 TABLET, FILM COATED ORAL at 22:15

## 2021-01-27 RX ADMIN — HYDROMORPHONE HYDROCHLORIDE 1 MG: 1 INJECTION, SOLUTION INTRAMUSCULAR; INTRAVENOUS; SUBCUTANEOUS at 14:13

## 2021-01-27 RX ADMIN — Medication 10 ML: at 14:00

## 2021-01-27 RX ADMIN — DOCUSATE SODIUM 100 MG: 100 CAPSULE, LIQUID FILLED ORAL at 08:22

## 2021-01-27 RX ADMIN — ONDANSETRON 4 MG: 2 INJECTION INTRAMUSCULAR; INTRAVENOUS at 18:12

## 2021-01-27 RX ADMIN — ENOXAPARIN SODIUM 40 MG: 40 INJECTION SUBCUTANEOUS at 08:20

## 2021-01-27 NOTE — PROGRESS NOTES
0700 Bedside shift change report given to Olga Silva (oncoming nurse) by Armond Weston RN (offgoing nurse). Report included the following information SBAR, Kardex, Intake/Output and MAR.     1112 Called PICC team to place IV due to pt IV infiltrating. 706 [facility] team to place IV due to pt IV infiltrating again.

## 2021-01-27 NOTE — PROGRESS NOTES
Problem: Falls - Risk of  Goal: *Absence of Falls  Description: Document Jerry Contreras Fall Risk and appropriate interventions in the flowsheet.   Outcome: Progressing Towards Goal  Note: Fall Risk Interventions:            Medication Interventions: Bed/chair exit alarm         History of Falls Interventions: Bed/chair exit alarm         Problem: Patient Education: Go to Patient Education Activity  Goal: Patient/Family Education  Outcome: Progressing Towards Goal

## 2021-01-27 NOTE — PROGRESS NOTES
End of Shift Note    Bedside shift change report given to Radha Corona  (oncoming nurse) by Zabrina Bocanegra RN (offgoing nurse). Report included the following information SBAR, Intake/Output, MAR and Accordion    Shift worked:  7p-7a   Shift summary and any significant changes:     m Pt able last night to go a little longer between pain meds. P also seemed to sleep better. Concerns for physician to address:  None   Zone phone for oncoming shift:   [phone]     Patient Information  Amanda Cisneros  64 y.o.  1/18/2021 11:21 AM by Adolph Shell MD. Amanda Cisneros was admitted from Home    Problem List  Patient Active Problem List    Diagnosis Date Noted    AVN of femur ([facility] [address] 01/25/2020     Priority: 3 - Three    Hypokalemia 01/25/2020     Priority: 3 - Three    Viral gastritis 07/06/2020    Dehydration 07/06/2020    UTI (urinary tract infection) 07/04/2020    Acute gastroenteritis 10/27/2019    Class 1 obesity due to excess calories with serious comorbidity and body mass index (BMI) of 30.0 to 30.9 in adult 08/13/2019    Sickle cell disease ([facility] [address] 02/10/2019    Overweight 02/07/2019    Sickle cell pain crisis ([facility] [address] 09/08/2018    Carpal tunnel syndrome of right wrist 06/12/2018    Pulsatile tinnitus 10/31/2017    Sickle cell crisis ([facility] [address] 05/22/2017    Depression 10/05/2014    Hypertension 10/03/2014    Venous insufficiency 10/03/2014     Past Medical History:   Diagnosis Date    Anemia     SC hemoglobinopathy    Chronic pain     Depression     Hypertension     Liver disease     hepatitis C; pt reports \"cured\"    Sickle cell disease ([facility] [address]        Core Measures:  CVA: No No  CHF:No No  PNA:No No    Activity:  Activity Level: Up ad erum  Number times ambulated in hallways past shift: 0  Number of times OOB to chair past shift: 0    Cardiac:   Cardiac Monitoring: Yes      Cardiac Rhythm: Normal sinus rhythm    Access:   Current line(s): PIV   Central Line?  No Placement date  Reason Medically Necessary   PICC LINE? No Placement date Reason Medically Necessary     Genitourinary:   Urinary status: voiding  Urinary Catheter? No Placement Date  Reason Medically Necessary     Respiratory:   O2 Device: Room air  Chronic home O2 use?: YES  Incentive spirometer at bedside: NO  Actual Volume (ml): 1000 ml    GI:  Last Bowel Movement Date: 01/26/21  Current diet:  DIET REGULAR  Passing flatus: YES  Tolerating current diet: YES  % Diet Eaten: 100 %    Pain Management:   Patient states pain is manageable on current regimen: YES    Skin:  Nuno Score: 23  Interventions: speciality bed and increase time out of bed    Patient Safety:  Fall Score: Total Score: 1  Interventions: bed/chair alarm and pt to call before getting OOB       DVT prophylaxis:  DVT prophylaxis Med- No  DVT prophylaxis SCD or SARA- No     Wounds: (If Applicable)  Wounds- No  Location     Active Consults:  IP CONSULT TO HOSPITALIST    Length of Stay:  Expected LOS: 2d 16h  Actual LOS: 9  Discharge Plan: No   Pt continues to c/o generalized pain, meds administered as ordered.  She denies chest pain or SOB, no distress noted      Brian Iverson RN

## 2021-01-28 LAB
ALBUMIN SERPL-MCNC: 3.1 G/DL (ref 3.5–5)
ALBUMIN/GLOB SERPL: 0.7 {RATIO} (ref 1.1–2.2)
ALP SERPL-CCNC: 99 U/L (ref 45–117)
ALT SERPL-CCNC: 18 U/L (ref 12–78)
ANION GAP SERPL CALC-SCNC: 4 MMOL/L (ref 5–15)
AST SERPL-CCNC: 32 U/L (ref 15–37)
BASOPHILS # BLD: 0 K/UL (ref 0–0.1)
BASOPHILS NFR BLD: 0 % (ref 0–1)
BILIRUB SERPL-MCNC: 1.1 MG/DL (ref 0.2–1)
BLASTS NFR BLD MANUAL: 0 %
BUN SERPL-MCNC: 9 MG/DL (ref 6–20)
BUN/CREAT SERPL: 16 (ref 12–20)
CALCIUM SERPL-MCNC: 8.4 MG/DL (ref 8.5–10.1)
CHLORIDE SERPL-SCNC: 103 MMOL/L (ref 97–108)
CO2 SERPL-SCNC: 28 MMOL/L (ref 21–32)
CREAT SERPL-MCNC: 0.58 MG/DL (ref 0.55–1.02)
DIFFERENTIAL METHOD BLD: ABNORMAL
EOSINOPHIL # BLD: 0.2 K/UL (ref 0–0.4)
EOSINOPHIL NFR BLD: 4 % (ref 0–7)
ERYTHROCYTE [DISTWIDTH] IN BLOOD BY AUTOMATED COUNT: 13.7 % (ref 11.5–14.5)
GLOBULIN SER CALC-MCNC: 4.2 G/DL (ref 2–4)
GLUCOSE SERPL-MCNC: 73 MG/DL (ref 65–100)
HCT VFR BLD AUTO: 21.5 % (ref 35–47)
HGB BLD-MCNC: 7.4 G/DL (ref 11.5–16)
IMM GRANULOCYTES # BLD AUTO: 0 K/UL
IMM GRANULOCYTES NFR BLD AUTO: 0 %
LYMPHOCYTES # BLD: 2.8 K/UL (ref 0.8–3.5)
LYMPHOCYTES NFR BLD: 46 % (ref 12–49)
MCH RBC QN AUTO: 38.9 PG (ref 26–34)
MCHC RBC AUTO-ENTMCNC: 34.4 G/DL (ref 30–36.5)
MCV RBC AUTO: 113.2 FL (ref 80–99)
METAMYELOCYTES NFR BLD MANUAL: 0 %
MONOCYTES # BLD: 0.6 K/UL (ref 0–1)
MONOCYTES NFR BLD: 10 % (ref 5–13)
MYELOCYTES NFR BLD MANUAL: 3 %
NEUTS BAND NFR BLD MANUAL: 0 % (ref 0–6)
NEUTS SEG # BLD: 2.3 K/UL (ref 1.8–8)
NEUTS SEG NFR BLD: 37 % (ref 32–75)
NRBC # BLD: 0.47 K/UL (ref 0–0.01)
NRBC BLD-RTO: 7.7 PER 100 WBC
OTHER CELLS NFR BLD MANUAL: 0 %
PLATELET # BLD AUTO: 195 K/UL (ref 150–400)
PMV BLD AUTO: 11.6 FL (ref 8.9–12.9)
POTASSIUM SERPL-SCNC: 4.2 MMOL/L (ref 3.5–5.1)
PROMYELOCYTES NFR BLD MANUAL: 0 %
PROT SERPL-MCNC: 7.3 G/DL (ref 6.4–8.2)
RBC # BLD AUTO: 1.9 M/UL (ref 3.8–5.2)
RBC MORPH BLD: ABNORMAL
SODIUM SERPL-SCNC: 135 MMOL/L (ref 136–145)
WBC # BLD AUTO: 6.1 K/UL (ref 3.6–11)

## 2021-01-28 PROCEDURE — 2709999900 HC NON-CHARGEABLE SUPPLY

## 2021-01-28 PROCEDURE — 85027 COMPLETE CBC AUTOMATED: CPT

## 2021-01-28 PROCEDURE — 74011250637 HC RX REV CODE- 250/637: Performed by: HOSPITALIST

## 2021-01-28 PROCEDURE — 74011250636 HC RX REV CODE- 250/636: Performed by: INTERNAL MEDICINE

## 2021-01-28 PROCEDURE — 74011250637 HC RX REV CODE- 250/637: Performed by: INTERNAL MEDICINE

## 2021-01-28 PROCEDURE — 80053 COMPREHEN METABOLIC PANEL: CPT

## 2021-01-28 PROCEDURE — 65660000000 HC RM CCU STEPDOWN

## 2021-01-28 PROCEDURE — 74011250636 HC RX REV CODE- 250/636: Performed by: HOSPITALIST

## 2021-01-28 PROCEDURE — 36415 COLL VENOUS BLD VENIPUNCTURE: CPT

## 2021-01-28 RX ORDER — HYDROCODONE BITARTRATE AND ACETAMINOPHEN 10; 325 MG/1; MG/1
1 TABLET ORAL
Status: DISCONTINUED | OUTPATIENT
Start: 2021-01-28 | End: 2021-01-29 | Stop reason: HOSPADM

## 2021-01-28 RX ADMIN — HYDROCODONE BITARTRATE AND ACETAMINOPHEN 1 TABLET: 10; 325 TABLET ORAL at 19:38

## 2021-01-28 RX ADMIN — DIPHENHYDRAMINE HYDROCHLORIDE 12.5 MG: 50 INJECTION, SOLUTION INTRAMUSCULAR; INTRAVENOUS at 04:06

## 2021-01-28 RX ADMIN — DOCUSATE SODIUM 100 MG: 100 CAPSULE, LIQUID FILLED ORAL at 17:42

## 2021-01-28 RX ADMIN — Medication 10 ML: at 22:12

## 2021-01-28 RX ADMIN — Medication 10 ML: at 06:57

## 2021-01-28 RX ADMIN — FOLIC ACID 1 MG: 1 TABLET ORAL at 08:29

## 2021-01-28 RX ADMIN — HYDROXYUREA 500 MG: 500 CAPSULE ORAL at 10:26

## 2021-01-28 RX ADMIN — ENOXAPARIN SODIUM 40 MG: 40 INJECTION SUBCUTANEOUS at 08:29

## 2021-01-28 RX ADMIN — PANTOPRAZOLE SODIUM 40 MG: 40 TABLET, DELAYED RELEASE ORAL at 17:42

## 2021-01-28 RX ADMIN — ONDANSETRON 4 MG: 2 INJECTION INTRAMUSCULAR; INTRAVENOUS at 11:42

## 2021-01-28 RX ADMIN — CITALOPRAM HYDROBROMIDE 40 MG: 20 TABLET ORAL at 17:42

## 2021-01-28 RX ADMIN — HYDROCODONE BITARTRATE AND ACETAMINOPHEN 1 TABLET: 10; 325 TABLET ORAL at 11:43

## 2021-01-28 RX ADMIN — DOCUSATE SODIUM 100 MG: 100 CAPSULE, LIQUID FILLED ORAL at 08:29

## 2021-01-28 RX ADMIN — LOSARTAN POTASSIUM 50 MG: 50 TABLET, FILM COATED ORAL at 22:11

## 2021-01-28 RX ADMIN — HYDROXYUREA 500 MG: 500 CAPSULE ORAL at 17:42

## 2021-01-28 RX ADMIN — HYDROMORPHONE HYDROCHLORIDE 1 MG: 1 INJECTION, SOLUTION INTRAMUSCULAR; INTRAVENOUS; SUBCUTANEOUS at 04:06

## 2021-01-28 NOTE — PROGRESS NOTES
End of Shift Note Bedside shift change report given to  (oncoming nurse) by Mela Villeda RN (offgoing nurse). Report included the following information SBAR, OR Summary and Recent Results Shift worked:  Night shift Shift summary and any significant changes:  
  Michael YEAGER Q6 Concerns for physician to address:  No new onset Zone phone for oncoming shift:     
 
Patient Information Gabrielle Stevens 64 y.o. 
1/18/2021 11:21 AM by Milton Cody MD. Gabrielle Stevens was admitted from Home 
 
Problem List 
Patient Active Problem List  
 Diagnosis Date Noted  AVN of femur ([facility] [address] 01/25/2020 Priority: 3 - Three  Hypokalemia 01/25/2020 Priority: 3 - Three  Viral gastritis 07/06/2020  Dehydration 07/06/2020  UTI (urinary tract infection) 07/04/2020  Acute gastroenteritis 10/27/2019  Class 1 obesity due to excess calories with serious comorbidity and body mass index (BMI) of 30.0 to 30.9 in adult 08/13/2019  Sickle cell disease ([facility] [address] 02/10/2019  Overweight 02/07/2019  Sickle cell pain crisis ([facility] [address] 09/08/2018  Carpal tunnel syndrome of right wrist 06/12/2018  Pulsatile tinnitus 10/31/2017  Sickle cell crisis ([facility] [address] 05/22/2017  Depression 10/05/2014  Hypertension 10/03/2014  Venous insufficiency 10/03/2014 Past Medical History:  
Diagnosis Date  Anemia SC hemoglobinopathy  Chronic pain  Depression  Hypertension  Liver disease   
 hepatitis C; pt reports \"cured\"  Sickle cell disease ([facility] [address] Core Measures: CVA: No No 
CHF:No No 
PNA:No No 
 
Activity: 
Activity Level: Up ad erum Number times ambulated in hallways past shift: 0 Number of times OOB to chair past shift: 0 Cardiac:  
Cardiac Monitoring: No     
Cardiac Rhythm: Normal sinus rhythm Access:  
Current line(s): PIV Central Line? Yes Placement date  Reason Medically Necessary PICC LINE? No Placement date Reason Medically Necessary Genitourinary: Urinary status: voiding Urinary Catheter? No Placement Date  Reason Medically Necessary Respiratory:  
O2 Device: Room air Chronic home O2 use?: NO Incentive spirometer at bedside: YES Actual Volume (ml): 1000 ml GI: 
Last Bowel Movement Date: 01/26/21 Current diet:  DIET REGULAR Passing flatus: YES Tolerating current diet: YES 
% Diet Eaten: 100 % Pain Management:  
Patient states pain is manageable on current regimen: YES Skin: 
Nuno Score: 23 Interventions: limit briefs Patient Safety: 
Fall Score: Total Score: 1 Interventions: pt to call before getting OOB 
  
 
DVT prophylaxis: DVT prophylaxis Med- Yes DVT prophylaxis SCD or SARA- Yes Wounds: (If Applicable) Wounds- No 
Location Active Consults: 
IP CONSULT TO HOSPITALIST Length of Stay: 
Expected LOS: 2d 16h Actual LOS: 10 Discharge Plan: Yes pt c/o genealized pain, meds administered accordingly. She denies chest or SOB at this time.  
 
 
Diana Rivera RN

## 2021-01-28 NOTE — PROGRESS NOTES
End of Shift Note    Bedside shift change report given to SAMIR morgan  (oncoming nurse) by Vandana Burkett (offgoing nurse). Report included the following information SBAR, Intake/Output, MAR and Accordion    Shift worked:  7a-7p   Shift summary and any significant changes:     pt no longer on IV pain meds        Concerns for physician to address:  None   Zone phone for oncoming shift:   [phone]     Patient Information  Jennifer Jackson  64 y.o.  1/18/2021 11:21 AM by Florina Rose MD. Jennifer Jackson was admitted from Home    Problem List  Patient Active Problem List    Diagnosis Date Noted    AVN of femur ([facility] [address] 01/25/2020     Priority: 3 - Three    Hypokalemia 01/25/2020     Priority: 3 - Three    Viral gastritis 07/06/2020    Dehydration 07/06/2020    UTI (urinary tract infection) 07/04/2020    Acute gastroenteritis 10/27/2019    Class 1 obesity due to excess calories with serious comorbidity and body mass index (BMI) of 30.0 to 30.9 in adult 08/13/2019    Sickle cell disease ([facility] [address] 02/10/2019    Overweight 02/07/2019    Sickle cell pain crisis ([facility] [address] 09/08/2018    Carpal tunnel syndrome of right wrist 06/12/2018    Pulsatile tinnitus 10/31/2017    Sickle cell crisis ([facility] [address] 05/22/2017    Depression 10/05/2014    Hypertension 10/03/2014    Venous insufficiency 10/03/2014     Past Medical History:   Diagnosis Date    Anemia     SC hemoglobinopathy    Chronic pain     Depression     Hypertension     Liver disease     hepatitis C; pt reports \"cured\"    Sickle cell disease ([facility] [address]        Core Measures:  CVA: No No  CHF:No No  PNA:No No    Activity:  Activity Level: Up ad erum  Number times ambulated in hallways past shift: 0  Number of times OOB to chair past shift: 0    Cardiac:   Cardiac Monitoring: Yes      Cardiac Rhythm: Normal sinus rhythm    Access:   Current line(s): PIV   Central Line? No Placement date  Reason Medically Necessary   PICC LINE?  No Placement date Reason Medically Necessary Genitourinary:   Urinary status: voiding  Urinary Catheter? No Placement Date  Reason Medically Necessary     Respiratory:   O2 Device: Room air  Chronic home O2 use?: YES  Incentive spirometer at bedside: NO  Actual Volume (ml): 1000 ml    GI:  Last Bowel Movement Date: 01/26/21  Current diet:  DIET REGULAR  Passing flatus: YES  Tolerating current diet: YES  % Diet Eaten: 100 %    Pain Management:   Patient states pain is manageable on current regimen: YES    Skin:  Nuno Score: 23  Interventions: speciality bed and increase time out of bed    Patient Safety:  Fall Score: Total Score: 1  Interventions: bed/chair alarm and pt to call before getting OOB       DVT prophylaxis:  DVT prophylaxis Med- No  DVT prophylaxis SCD or SARA- No     Wounds: (If Applicable)  Wounds- No  Location     Active Consults:  IP CONSULT TO HOSPITALIST    Length of Stay:  Expected LOS: 2d 16h  Actual LOS: 10  Discharge Plan: No   Pt continues to c/o generalized pain, meds administered as ordered.  She denies chest pain or SOB, no distress noted      [place]

## 2021-01-28 NOTE — PROGRESS NOTES
General Daily Progress Note    Admit Date: 1/18/2021    Subjective:     Patient feels better. with minimal pain. Current Facility-Administered Medications   Medication Dose Route Frequency    HYDROcodone-acetaminophen (NORCO)  mg tablet 1 Tab  1 Tab Oral Q6H PRN    dextrose 5 % - 0.45% NaCl 1,000 mL with potassium chloride 30 mEq infusion   IntraVENous CONTINUOUS    fentaNYL (DURAGESIC) 75 mcg/hr patch 1 Patch  1 Patch TransDERmal Q72H    sodium chloride (NS) flush 5-40 mL  5-40 mL IntraVENous Q8H    sodium chloride (NS) flush 5-40 mL  5-40 mL IntraVENous PRN    acetaminophen (TYLENOL) tablet 650 mg  650 mg Oral Q6H PRN    Or    acetaminophen (TYLENOL) suppository 650 mg  650 mg Rectal Q6H PRN    polyethylene glycol (MIRALAX) packet 17 g  17 g Oral DAILY PRN    promethazine (PHENERGAN) tablet 12.5 mg  12.5 mg Oral Q6H PRN    Or    ondansetron (ZOFRAN) injection 4 mg  4 mg IntraVENous Q6H PRN    enoxaparin (LOVENOX) injection 40 mg  40 mg SubCUTAneous DAILY    docusate sodium (COLACE) capsule 100 mg  100 mg Oral BID    diphenhydrAMINE (BENADRYL) injection 12.5 mg  12.5 mg IntraVENous X4D PRN    folic acid (FOLVITE) tablet 1 mg  1 mg Oral DAILY    citalopram (CELEXA) tablet 40 mg  40 mg Oral QPM    hydroxyurea (HYDREA) chemo cap 500 mg  500 mg Oral BID    pantoprazole (PROTONIX) tablet 40 mg  40 mg Oral ACD    losartan (COZAAR) tablet 50 mg  50 mg Oral QHS    naloxone (NARCAN) injection 0.4 mg  0.4 mg IntraVENous PRN        Review of Systems  A comprehensive review of systems was negative.     Objective:     Patient Vitals for the past 24 hrs:   BP Temp Pulse Resp SpO2   01/28/21 0730 (!) 163/79 99.2 °F (37.3 °C) 89 18 95 %   01/28/21 0408 121/66 98.6 °F (37 °C) 80 16 94 %   01/28/21 0029 135/76 99.3 °F (37.4 °C) 82 16 96 %   01/27/21 2025 135/72 98.7 °F (37.1 °C) 89 16 94 %   01/27/21 1522 134/71 99.2 °F (37.3 °C) 89 18 97 %   01/27/21 1143 130/77 98.8 °F (37.1 °C) 89 18 92 %     No intake/output data recorded. 01/26 1901 - 01/28 0700  In: 1908.8 [I.V.:1908.8]  Out: -     Physical Exam:   Visit Vitals  BP (!) 163/79   Pulse 89   Temp 99.2 °F (37.3 °C)   Resp 18   Ht 5' 11\" (1.803 m)   Wt 213 lb 13.5 oz (97 kg)   SpO2 95%   Breastfeeding No   BMI 29.83 kg/m²     General appearance: alert, cooperative, no distress, appears stated age  Neck: supple, symmetrical, trachea midline, no adenopathy, thyroid: not enlarged, symmetric, no tenderness/mass/nodules, no carotid bruit and no JVD  Lungs: clear to auscultation bilaterally  Heart: regular rate and rhythm, S1, S2 normal, no murmur, click, rub or gallop  Abdomen: soft, non-tender. Bowel sounds normal. No masses,  no organomegaly  Extremities: extremities normal, atraumatic, no cyanosis or edema    Assessment:     Active Problems:    Hypertension (10/3/2014)      Sickle cell crisis ([facility] [address] (5/22/2017)      Class 1 obesity due to excess calories with serious comorbidity and body mass index (BMI) of 30.0 to 30.9 in adult (8/13/2019)        Plan:     1. We will transition off of parenteral analgesics in preparation for discharge tomorrow. 2.  Lab results noted.

## 2021-01-28 NOTE — PROGRESS NOTES
General Daily Progress Note    Admit Date: 1/18/2021    Subjective:     Patient continues to complain of pain but improving. .     Current Facility-Administered Medications   Medication Dose Route Frequency    HYDROmorphone (PF) (DILAUDID) injection 1 mg  1 mg IntraVENous Q6H PRN    dextrose 5 % - 0.45% NaCl 1,000 mL with potassium chloride 30 mEq infusion   IntraVENous CONTINUOUS    fentaNYL (DURAGESIC) 75 mcg/hr patch 1 Patch  1 Patch TransDERmal Q72H    sodium chloride (NS) flush 5-40 mL  5-40 mL IntraVENous Q8H    sodium chloride (NS) flush 5-40 mL  5-40 mL IntraVENous PRN    acetaminophen (TYLENOL) tablet 650 mg  650 mg Oral Q6H PRN    Or    acetaminophen (TYLENOL) suppository 650 mg  650 mg Rectal Q6H PRN    polyethylene glycol (MIRALAX) packet 17 g  17 g Oral DAILY PRN    promethazine (PHENERGAN) tablet 12.5 mg  12.5 mg Oral Q6H PRN    Or    ondansetron (ZOFRAN) injection 4 mg  4 mg IntraVENous Q6H PRN    enoxaparin (LOVENOX) injection 40 mg  40 mg SubCUTAneous DAILY    docusate sodium (COLACE) capsule 100 mg  100 mg Oral BID    diphenhydrAMINE (BENADRYL) injection 12.5 mg  12.5 mg IntraVENous I6C PRN    folic acid (FOLVITE) tablet 1 mg  1 mg Oral DAILY    citalopram (CELEXA) tablet 40 mg  40 mg Oral QPM    hydroxyurea (HYDREA) chemo cap 500 mg  500 mg Oral BID    pantoprazole (PROTONIX) tablet 40 mg  40 mg Oral ACD    losartan (COZAAR) tablet 50 mg  50 mg Oral QHS    naloxone (NARCAN) injection 0.4 mg  0.4 mg IntraVENous PRN        Review of Systems  A comprehensive review of systems was negative.     Objective:     Patient Vitals for the past 24 hrs:   BP Temp Pulse Resp SpO2   01/27/21 2025 135/72 98.7 °F (37.1 °C) 89 16 94 %   01/27/21 1522 134/71 99.2 °F (37.3 °C) 89 18 97 %   01/27/21 1143 130/77 98.8 °F (37.1 °C) 89 18 92 %   01/27/21 0737 (!) 143/68 98.6 °F (37 °C) 96 18 93 %   01/27/21 0424 (!) 144/76 98.2 °F (36.8 °C) 93 16 96 %   01/27/21 0012 136/70 98.9 °F (37.2 °C) 91 16 92 % No intake/output data recorded. 01/26 0701 - 01/27 1900  In: 1908.8 [I.V.:1908.8]  Out: -     Physical Exam:   Visit Vitals  /72   Pulse 89   Temp 98.7 °F (37.1 °C)   Resp 16   Ht 5' 11\" (1.803 m)   Wt 213 lb 13.5 oz (97 kg)   SpO2 94%   Breastfeeding No   BMI 29.83 kg/m²     General appearance: alert, cooperative, no distress, appears stated age  Neck: supple, symmetrical, trachea midline, no adenopathy, thyroid: not enlarged, symmetric, no tenderness/mass/nodules, no carotid bruit and no JVD  Lungs: clear to auscultation bilaterally  Heart: regular rate and rhythm, S1, S2 normal, no murmur, click, rub or gallop  Abdomen: soft, non-tender. Bowel sounds normal. No masses,  no organomegaly  Extremities: extremities normal, atraumatic, no cyanosis or edema    Assessment:     Active Problems:    Hypertension (10/3/2014)      Sickle cell crisis ([facility] [address] (5/22/2017)      Class 1 obesity due to excess calories with serious comorbidity and body mass index (BMI) of 30.0 to 30.9 in adult (8/13/2019)        Plan:     1. Continued improvement. Will reduce analgesics. 2.  BP acceptable. 3.  We will mobilize.

## 2021-01-28 NOTE — PROGRESS NOTES
Unable to give pain meds as the original dilaudid was discontinued and the new pain med is not stocked in pyxis. Messaged pharmacy they said they will stock it in the pyxis on the next run.  I explained this to the pt

## 2021-01-29 VITALS
DIASTOLIC BLOOD PRESSURE: 69 MMHG | BODY MASS INDEX: 29.94 KG/M2 | HEIGHT: 71 IN | TEMPERATURE: 98.8 F | SYSTOLIC BLOOD PRESSURE: 131 MMHG | OXYGEN SATURATION: 99 % | RESPIRATION RATE: 16 BRPM | HEART RATE: 79 BPM | WEIGHT: 213.85 LBS

## 2021-01-29 PROCEDURE — 74011250637 HC RX REV CODE- 250/637: Performed by: INTERNAL MEDICINE

## 2021-01-29 PROCEDURE — 74011250637 HC RX REV CODE- 250/637: Performed by: HOSPITALIST

## 2021-01-29 PROCEDURE — 99238 HOSP IP/OBS DSCHRG MGMT 30/<: CPT | Performed by: INTERNAL MEDICINE

## 2021-01-29 PROCEDURE — 74011250636 HC RX REV CODE- 250/636: Performed by: HOSPITALIST

## 2021-01-29 RX ORDER — FENTANYL 75 UG/H
1 PATCH TRANSDERMAL
Qty: 10 PATCH | Refills: 0 | Status: SHIPPED | OUTPATIENT
Start: 2021-01-31 | End: 2021-03-02

## 2021-01-29 RX ADMIN — FOLIC ACID 1 MG: 1 TABLET ORAL at 09:45

## 2021-01-29 RX ADMIN — HYDROXYUREA 500 MG: 500 CAPSULE ORAL at 09:45

## 2021-01-29 RX ADMIN — Medication 10 ML: at 06:42

## 2021-01-29 RX ADMIN — DOCUSATE SODIUM 100 MG: 100 CAPSULE, LIQUID FILLED ORAL at 09:45

## 2021-01-29 RX ADMIN — HYDROCODONE BITARTRATE AND ACETAMINOPHEN 1 TABLET: 10; 325 TABLET ORAL at 02:11

## 2021-01-29 RX ADMIN — HYDROCODONE BITARTRATE AND ACETAMINOPHEN 1 TABLET: 10; 325 TABLET ORAL at 09:45

## 2021-01-29 NOTE — PROGRESS NOTES
End of Shift Note    Bedside shift change report given to Michelle Flaherty , [facility]  (oncoming nurse) by Sebas Keyes (offgoing nurse). Report included the following information SBAR, Intake/Output, MAR and Accordion    Shift worked:  7P-7A   Shift summary and any significant changes:    Pt reported pain as a 3 when requesting pain meds, said PO meds were helping adequately       Concerns for physician to address:  None   Zone phone for oncoming shift:   [phone]     Patient Information  Timoteo Aj  64 y.o.  1/18/2021 11:21 AM by Mayuri Obregon MD. Timoteo Aj was admitted from Home    Problem List  Patient Active Problem List    Diagnosis Date Noted    AVN of femur ([facility] [address] 01/25/2020     Priority: 3 - Three    Hypokalemia 01/25/2020     Priority: 3 - Three    Viral gastritis 07/06/2020    Dehydration 07/06/2020    UTI (urinary tract infection) 07/04/2020    Acute gastroenteritis 10/27/2019    Class 1 obesity due to excess calories with serious comorbidity and body mass index (BMI) of 30.0 to 30.9 in adult 08/13/2019    Sickle cell disease ([facility] [address] 02/10/2019    Overweight 02/07/2019    Sickle cell pain crisis ([facility] [address] 09/08/2018    Carpal tunnel syndrome of right wrist 06/12/2018    Pulsatile tinnitus 10/31/2017    Sickle cell crisis ([facility] [address] 05/22/2017    Depression 10/05/2014    Hypertension 10/03/2014    Venous insufficiency 10/03/2014     Past Medical History:   Diagnosis Date    Anemia     SC hemoglobinopathy    Chronic pain     Depression     Hypertension     Liver disease     hepatitis C; pt reports \"cured\"    Sickle cell disease ([facility] [address]        Core Measures:  CVA: No No  CHF:No No  PNA:No No    Activity:  Activity Level: Up ad erum  Number times ambulated in hallways past shift: 0  Number of times OOB to chair past shift: 0    Cardiac:   Cardiac Monitoring: Yes      Cardiac Rhythm: Normal sinus rhythm    Access:   Current line(s): PIV   Central Line?  No Placement date  Reason Medically Necessary   PICC LINE? No Placement date Reason Medically Necessary     Genitourinary:   Urinary status: voiding  Urinary Catheter? No Placement Date  Reason Medically Necessary     Respiratory:   O2 Device: Room air  Chronic home O2 use?: YES  Incentive spirometer at bedside: NO  Actual Volume (ml): 1000 ml    GI:  Last Bowel Movement Date: 01/26/21  Current diet:  DIET REGULAR  Passing flatus: YES  Tolerating current diet: YES  % Diet Eaten: 100 %    Pain Management:   Patient states pain is manageable on current regimen: YES    Skin:  Nuno Score: 22  Interventions: speciality bed and increase time out of bed    Patient Safety:  Fall Score: Total Score: 0  Interventions: bed/chair alarm and pt to call before getting OOB       DVT prophylaxis:  DVT prophylaxis Med- No  DVT prophylaxis SCD or SARA- No     Wounds: (If Applicable)  Wounds- No  Location     Active Consults:  IP CONSULT TO HOSPITALIST    Length of Stay:  Expected LOS: 2d 16h  Actual LOS: 11  Discharge Plan: No   Pt continues to c/o generalized pain, meds administered as ordered.  She denies chest pain or SOB, no distress noted      [place]

## 2021-01-29 NOTE — PROGRESS NOTES
Discharge instructions, meds, and education discussed with pt. Pt brother picked  Pt up at main entrance.  Pt verbalized understanding

## 2021-01-29 NOTE — DISCHARGE INSTRUCTIONS
General Discharge Instructions    Patient ID:  Andrei Berrios  [id number]  64 y.o.  1960    Patient Instructions          The following personal items were collected during your admission and were returned to you. Take Home Medications             What to do at Home    Recommended diet: Regular Diet    Recommended activity: Activity as tolerated    Follow-up with Nuria De La Cruz MD  in 3 days. Information obtained by :  I understand that if any problems occur once I am at home I am to contact my physician. I understand and acknowledge receipt of the instructions indicated above.                                                                                                                                            Physician's or R.N.'s Signature                                                                  Date/Time                                                                                                                                              Patient or Representative Signature                                                          Date/Time

## 2021-01-29 NOTE — PROGRESS NOTES
KRISTINA Plan:    * Home with f/u apts    > PCP f/u apt secured for d/c; details reflected in AVS for reference  > Pt's brother to provide transport at d/c; anticipated arrival 11AM    9:13 AM: PCP f/u apt secured for d/c; details reflected in AVS. CM informed RN that pt's brother will arrive at 11AM to provide transport for d/c; RN verbalized understanding. Initial note: CM acknowledged d/c. CM reviewed pt's chart prior to moving forward with d/c planning. In anticipation for d/c, CM sent request to CM specialist to secure PCP f/u apt for d/c; request pending. CM met with pt at bedside to check in and review KRISTINA plan for d/c re: home with f/u apts. Upon conducting room visit, pt presented as A&O x4. CM confirmed pt is agreeable to the d/c plan. CM inquired if pt had any additional needs related to d/c; pt declined. Pt reported her brother will provide transportation at d/c; anticipated arrival projected for 11AM.     Medicare pt has received, reviewed, and provided verbal consent for 2nd IM letter informing them of their right to appeal the discharge. Copy has been placed on pt bedside chart. No further CM needs identified. CM notified pt's nurse of d/c. Care Management Interventions  PCP Verified by CM: Yes  Last Visit to PCP: 07/20/20  Palliative Care Criteria Met (RRAT>21 & CHF Dx)?: Yes  Palliative Consult Recommended?: No  Mode of Transport at Discharge:  Other (see comment)(Pt's brother to provide transport for d/c)  Hospital Transport Time of Discharge: 1100  Transition of Care Consult (CM Consult): Discharge Planning  Discharge Durable Medical Equipment: No  Physical Therapy Consult: No  Occupational Therapy Consult: No  Speech Therapy Consult: No  Current Support Network: Own Home, Lives Alone, Family Lives Nearby  Confirm Follow Up Transport: [address] Provided?: No  Discharge Location  Discharge Placement: Home(f/u apts)    TEMITOPE Lemus  Care Manager, ED [facility]  [phone]

## 2021-01-30 NOTE — DISCHARGE SUMMARY
Darling. Jerson Stallings [address]    Name:  Ro Goodwin  MR#:  [id number]  :  1960  ACCOUNT #:  [de-identified]  ADMIT DATE:  2021  DISCHARGE DATE:  2021    HISTORY OF PRESENT ILLNESS:  The patient is a 53-year-old lady with a history of SC hemoglobinopathy who presented to the emergency room because of increasing pain noted in her back, arms and legs. She has analgesics at home, but was unsuccessful to alleviate the discomfort. She presented to the emergency room and was subsequently admitted for painful sickle crisis. There appeared to be no secondary medical event precipitated this. Past medical history, social history, review of systems, family history, physical examination is as in admitting H and P.    LABORATORY VALUES:  Initial hemoglobin is 8.4, white count 6.7, MCV was 114.8. Platelet count is 002 with the following differential:  35 segs, 50 lymphocytes, 13 monocytes, 1 eosinophil, and 1 basophil. At the time of discharge, hemoglobin was 7.4, white count 6.1 with 37 segs, 46 lymphocytes, 10 monocytes, 4 eosinophils as well as 3 myelocytes. Abnormalities on the comprehensive profile on admission revealed a bilirubin of 0.9 with an AST of 44 and alk phos of 147. The AST subsequently normalized to 32 and the alk phos also normalized to 99. Chest x-ray negative. HOSPITAL COURSE:  The patient was admitted and placed on parenteral analgesics, O2 and fluids. She was also maintained on the continuous O2 monitored for saturation. She remained on this regimen for the majority of the hospital stay. Towards the latter part, her pain gradually improved and she was weaned off of the parenteral analgesics. At the time of discharge, she was back to baseline and was reasonably comfortable. Hospital course was otherwise uneventful. FINAL DIAGNOSES:  1.  Painful sickle crisis. 2.  SC hemoglobinopathy. 3.  Primary hypertension.   4.  History of depression. 5.  Obesity. 6.  Status post cholecystectomy. DISPOSITION:  1. The patient will be discharged home ambulatory on a regular diet. 2.  She remains a full code. 3.  Discharge medications include the following:  Topical hydrocortisone cream, Zofran 4 mg q. 8 hours p.r.n., Phenergan 25 mg q. 8 hours p.r.n., folic acid 1 mg daily, Celexa 10 mg daily, hydrocodone/acetaminophen 10/325 mg q. 6 hours p.r.n., hydroxyurea 500 mg b.i.d., omeprazole 20 mg daily, telmisartan 40 mg daily, fentanyl 75 mcg q. 3 days, Narcan 4 mg p.r.n., Miralax 17 g in a glass of water daily. 4.  The patient will return to the office in the next 2-3 days. Neal Patel MD  I personally is saw the patient and spent greater than 30 minutes on counseling and coordination of care and discharging the patient.     [id number]  D:  01/29/2021 9:03  T:  01/30/2021 2:08  JOB #:  [id number]

## 2021-02-01 ENCOUNTER — PATIENT OUTREACH (OUTPATIENT)
Dept: CASE MANAGEMENT | Age: 61
End: 2021-02-01

## 2021-02-01 NOTE — PROGRESS NOTES
Patient was admitted to [facility] on 1/18/21 and discharged on 1/29/21 for Sickle cell crisis. Outreach made within 2 business days of discharge: Yes    02/02/21  Attempted to contact patient on 2/1/21 and 2/21/21 without response.     Vi Sherwood MSN, RN, CCM  Care Transition Nurse

## 2021-02-05 ENCOUNTER — OFFICE VISIT (OUTPATIENT)
Dept: INTERNAL MEDICINE CLINIC | Age: 61
End: 2021-02-05
Payer: MEDICARE

## 2021-02-05 VITALS
HEART RATE: 88 BPM | TEMPERATURE: 98 F | DIASTOLIC BLOOD PRESSURE: 71 MMHG | SYSTOLIC BLOOD PRESSURE: 112 MMHG | OXYGEN SATURATION: 93 %

## 2021-02-05 DIAGNOSIS — D57.00 SICKLE CELL PAIN CRISIS (HCC): Primary | ICD-10-CM

## 2021-02-05 DIAGNOSIS — Z09 HOSPITAL DISCHARGE FOLLOW-UP: ICD-10-CM

## 2021-02-05 DIAGNOSIS — E66.3 OVERWEIGHT (BMI 25.0-29.9): ICD-10-CM

## 2021-02-05 DIAGNOSIS — I10 ESSENTIAL HYPERTENSION: ICD-10-CM

## 2021-02-05 DIAGNOSIS — K52.9 ACUTE GASTROENTERITIS: ICD-10-CM

## 2021-02-05 PROCEDURE — 1111F DSCHRG MED/CURRENT MED MERGE: CPT | Performed by: INTERNAL MEDICINE

## 2021-02-05 PROCEDURE — G8427 DOCREV CUR MEDS BY ELIG CLIN: HCPCS | Performed by: INTERNAL MEDICINE

## 2021-02-05 PROCEDURE — 99496 TRANSJ CARE MGMT HIGH F2F 7D: CPT | Performed by: INTERNAL MEDICINE

## 2021-02-05 RX ORDER — HYDROCODONE BITARTRATE AND ACETAMINOPHEN 10; 325 MG/1; MG/1
1 TABLET ORAL
Qty: 120 TAB | Refills: 0 | Status: CANCELLED | OUTPATIENT
Start: 2021-02-05 | End: 2021-03-07

## 2021-02-05 RX ORDER — HYDROCORTISONE 1 %
CREAM (GRAM) TOPICAL
Qty: 30 G | Refills: 11 | Status: CANCELLED | OUTPATIENT
Start: 2021-02-05

## 2021-02-05 RX ORDER — FOLIC ACID 1 MG/1
1 TABLET ORAL DAILY
Qty: 90 TAB | Refills: 3 | Status: CANCELLED | OUTPATIENT
Start: 2021-02-05

## 2021-02-05 NOTE — PROGRESS NOTES
Chief Complaint   Patient presents with   [facility] Follow Up     Patient is here for a follow up      1. Have you been to the ER, urgent care clinic since your last visit? Hospitalized since your last visit? Yes Reason for visit: Sickel cell    2. Have you seen or consulted any other health care providers outside of the [address] [place] [address] since your last visit? Include any pap smears or colon screening.  No

## 2021-02-06 LAB
BASOPHILS # BLD: 0.1 K/UL (ref 0–0.1)
BASOPHILS NFR BLD: 1 % (ref 0–1)
DIFFERENTIAL METHOD BLD: ABNORMAL
EOSINOPHIL # BLD: 0.1 K/UL (ref 0–0.4)
EOSINOPHIL NFR BLD: 1 % (ref 0–7)
ERYTHROCYTE [DISTWIDTH] IN BLOOD BY AUTOMATED COUNT: 13.7 % (ref 11.5–14.5)
HCT VFR BLD AUTO: 26.9 % (ref 35–47)
HGB BLD-MCNC: 9.6 G/DL (ref 11.5–16)
IMM GRANULOCYTES # BLD AUTO: 0 K/UL (ref 0–0.04)
IMM GRANULOCYTES NFR BLD AUTO: 0 % (ref 0–0.5)
LYMPHOCYTES # BLD: 2.9 K/UL (ref 0.8–3.5)
LYMPHOCYTES NFR BLD: 49 % (ref 12–49)
MCH RBC QN AUTO: 40.7 PG (ref 26–34)
MCHC RBC AUTO-ENTMCNC: 35.7 G/DL (ref 30–36.5)
MCV RBC AUTO: 114 FL (ref 80–99)
MONOCYTES # BLD: 0.6 K/UL (ref 0–1)
MONOCYTES NFR BLD: 10 % (ref 5–13)
NEUTS SEG # BLD: 2.3 K/UL (ref 1.8–8)
NEUTS SEG NFR BLD: 39 % (ref 32–75)
NRBC # BLD: 0.26 K/UL (ref 0–0.01)
NRBC BLD-RTO: 4.3 PER 100 WBC
PLATELET # BLD AUTO: 230 K/UL (ref 150–400)
PMV BLD AUTO: 12.9 FL (ref 8.9–12.9)
RBC # BLD AUTO: 2.36 M/UL (ref 3.8–5.2)
RBC MORPH BLD: ABNORMAL
RBC MORPH BLD: ABNORMAL
WBC # BLD AUTO: 6 K/UL (ref 3.6–11)

## 2021-02-06 NOTE — PROGRESS NOTES
[facility]  [place] [address]  [address] [place] [address]  Phone:  [phone]  Fax: [phone]       Chief Complaint   Patient presents with   [address]     Patient is here for a Hospital folllow up. .      SUBJECTIVE:    Dileep Montano is a 64 y.o. female comes in for a return visit stating that she has done quite well. She was hospitalized most recently from [facility] for sickle cell crisis. She continues to have mild pain in the right pelvic area, but it is minimal.    Her fatigue has indeed improved also. She has a past history of primary hypertension and depression. She has had no meaningful weight loss since I last saw her either. Current Outpatient Medications   Medication Sig Dispense Refill    fentaNYL (DURAGESIC) 75 mcg/hr 1 Patch by TransDERmal route every seventy-two (72) hours for 30 days. Max Daily Amount: 1 Patch. 10 Patch 0    folic acid (FOLVITE) 1 mg tablet Take 1 mg by mouth daily.  ondansetron (Zofran ODT) 4 mg disintegrating tablet 1 Tab by SubLINGual route every eight (8) hours as needed for Nausea or Vomiting. 20 Tab 0    hydroxyurea (HYDREA) 500 mg capsule TAKE 1 CAPSULE BY MOUTH TWICE DAILY 60 Cap 11    telmisartan (MICARDIS) 40 mg tablet Take 1 Tab by mouth nightly. 30 Tab 11    promethazine (PHENERGAN) 25 mg tablet Take 1 Tab by mouth every eight (8) hours as needed for Nausea. 90 Tab 11    citalopram (CELEXA) 10 mg tablet TAKE 1 TABLET BY MOUTH EVERY NIGHT AT BEDTIME 90 Tab 3    hydrocortisone (CORTISONE) 1 % topical cream Apply  to affected area two (2) times daily as needed for Skin Irritation. use thin layer 30 g 11    omeprazole (PRILOSEC OTC) 20 mg tablet Take 20 mg by mouth nightly.        Past Medical History:   Diagnosis Date    Anemia     SC hemoglobinopathy    Chronic pain     Depression     Hypertension     Liver disease     hepatitis C; pt reports \"cured\"    Sickle cell disease [facility])      Past Surgical History:   Procedure Laterality Date    HX BREAST BIOPSY Right 05/2007    negative    HX CHOLECYSTECTOMY      HX ORTHOPAEDIC      bony curettage of an ostial myelitis focus    [place] BREAST SURGERY PROCEDURE UNLISTED      2 cysts removed from R breast    [place] CHEST SURGERY PROCEDURE UNLISTED      status post thor for removal of a benign      Allergies   Allergen Reactions    Dilaudid [Hydromorphone (Bulk)] Itching     Can tolerate with benadryl and ondansetron    Percocet [Oxycodone-Acetaminophen] Itching         REVIEW OF SYSTEMS:  General: negative for - chills or fever  ENT: negative for - headaches, nasal congestion or tinnitus  Respiratory: negative for - cough, hemoptysis, shortness of breath or wheezing  Cardiovascular : negative for - chest pain, edema, palpitations or shortness of breath  Gastrointestinal: negative for - abdominal pain, blood in stools, heartburn or nausea/vomiting  Genito-Urinary: no dysuria, trouble voiding, or hematuria  Musculoskeletal: negative for - gait disturbance, joint pain, joint stiffness or joint swelling  Neurological: no TIA or stroke symptoms  Hematologic: no bruises, no bleeding, no swollen glands  Integument: no lumps, mole changes, nail changes or rash  Endocrine: no malaise/lethargy or unexpected weight changes      Social History     Socioeconomic History    Marital status:      Spouse name: Not on file    Number of children: 2    Years of education: Not on file    Highest education level: Not on file   Occupational History    Occupation: disabled---Sickle cell disease   Tobacco Use    Smoking status: Never Smoker    Smokeless tobacco: Never Used   Substance and Sexual Activity    Alcohol use: No    Drug use: No    Sexual activity: Yes     Partners: Male     Family History   Problem Relation Age of Onset    Hypertension Mother     Hypertension Father        OBJECTIVE:    Visit Vitals  /71   Pulse 88   Temp 98 °F (36.7 °C)   SpO2 93%     CONSTITUTIONAL: well , well nourished, appears age appropriate  EYES: perrla, eom intact  ENMT:moist mucous membranes, pharynx clear  NECK: supple. Thyroid normal  RESPIRATORY: Chest: clear to ascultation and percussion   CARDIOVASCULAR: Heart: regular rate and rhythm  GASTROINTESTINAL: Abdomen: soft, bowel sounds active  HEMATOLOGIC: no pathological lymph nodes palpated  MUSCULOSKELETAL: Extremities: no edema, pulse 1+   INTEGUMENT: No unusual rashes or suspicious skin lesions noted. Nails appear normal.  NEUROLOGIC: non-focal exam   MENTAL STATUS: alert and oriented, appropriate affect      ASSESSMENT:  1. Sickle cell pain crisis ([address]    2. Acute gastroenteritis    3. Essential hypertension    4. Overweight (BMI 25.0-29.9)    5. Dermatitis    6. Sickle cell crisis ([address]        PLAN:  1. Her sickle crisis is doing quite well. She has mild residual pain on the right, but this is minimal for now. She will continue her current regimen. 2. She developed mild nausea, but no millicent vomiting after discharge. This is indeed better currently. 3. Her blood pressure is excellent today. No adjustments are made. 4. She does need to lose weight. This can be accomplished by eating meals, eliminating snacks, and avoiding the consumption of processed carbohydrates. .  Orders Placed This Encounter    CBC WITH AUTOMATED DIFF               Leah Apgar, MD    Transitional Care Management Progress Note    Patient: Deniz Goddard  : 1960  PCP: rGeg Stallworth MD    Date of office visit: 2021   Date of admission: 21  Date of discharge: 21  Hospital: [facility]    Call initiated w/i 2 business dates of discharge: Yes   Date of the most recent call to the patient: 2021  2:34 PM      Assessment/Plan:   Diagnoses and all orders for this visit:    1. Sickle cell pain crisis ([facility] [address]  -     CBC WITH AUTOMATED DIFF;  Future  -     RI DISCHARGE MEDS RECONCILED W/ CURRENT OUTPATIENT MED LIST  -     FULL CODE    2. Acute gastroenteritis    3. Essential hypertension    4. Overweight (BMI 25.0-29.9)    5. Hospital discharge follow-up  -     WI DISCHARGE MEDS RECONCILED W/ CURRENT OUTPATIENT MED LIST  -     FULL CODE         Subjective: Aman Owens is a 64 y.o. female presenting today for follow-up after hospital discharge. This encounter and supporting documentation was reviewed if available. Medication reconciliation was performed today. The main problem requiring admission was resolved. Complications during admission: none      Interval history/Current status: improved    Admitting symptoms have: significantly improved      Medications marked \"taking\" at this time:  Prior to Admission medications    Medication Sig Start Date End Date Taking? Authorizing Provider   fentaNYL (DURAGESIC) 75 mcg/hr 1 Patch by TransDERmal route every seventy-two (72) hours for 30 days. Max Daily Amount: 1 Patch. 1/31/21 3/2/21 Yes Mic Escobedo MD   folic acid (FOLVITE) 1 mg tablet Take 1 mg by mouth daily. Yes Provider, Historical   ondansetron (Zofran ODT) 4 mg disintegrating tablet 1 Tab by SubLINGual route every eight (8) hours as needed for Nausea or Vomiting. 1/7/21  Yes Sari Rudolph MD   HYDROcodone-acetaminophen [facility])  mg tablet Take 1 Tab by mouth every six (6) hours as needed for Pain for up to 30 days. Max Daily Amount: 4 Tabs. [id number] 1/6/21 2/5/21 Yes Mic Escobedo MD   hydroxyurea (HYDREA) 500 mg capsule TAKE 1 CAPSULE BY MOUTH TWICE DAILY 10/31/20  Yes Mic Escobedo MD   telmisartan (MICARDIS) 40 mg tablet Take 1 Tab by mouth nightly. 7/27/20  Yes Mic Escobedo MD   promethazine (PHENERGAN) 25 mg tablet Take 1 Tab by mouth every eight (8) hours as needed for Nausea.  5/26/20  Yes Mic Escobedo MD   citalopram (CELEXA) 10 mg tablet TAKE 1 TABLET BY MOUTH EVERY NIGHT AT BEDTIME 4/28/20  Yes Mic Escobedo MD   hydrocortisone (CORTISONE) 1 % topical cream Apply  to affected area two (2) times daily as needed for Skin Irritation. use thin layer 7/26/19  Yes Brenda Palmer MD   omeprazole (PRILOSEC OTC) 20 mg tablet Take 20 mg by mouth nightly. Yes Other, MD Ean        ROS:  14 point ros neg       Current Outpatient Medications   Medication Sig Dispense Refill    fentaNYL (DURAGESIC) 75 mcg/hr 1 Patch by TransDERmal route every seventy-two (72) hours for 30 days. Max Daily Amount: 1 Patch. 10 Patch 0    folic acid (FOLVITE) 1 mg tablet Take 1 mg by mouth daily.  ondansetron (Zofran ODT) 4 mg disintegrating tablet 1 Tab by SubLINGual route every eight (8) hours as needed for Nausea or Vomiting. 20 Tab 0    hydroxyurea (HYDREA) 500 mg capsule TAKE 1 CAPSULE BY MOUTH TWICE DAILY 60 Cap 11    telmisartan (MICARDIS) 40 mg tablet Take 1 Tab by mouth nightly. 30 Tab 11    promethazine (PHENERGAN) 25 mg tablet Take 1 Tab by mouth every eight (8) hours as needed for Nausea. 90 Tab 11    citalopram (CELEXA) 10 mg tablet TAKE 1 TABLET BY MOUTH EVERY NIGHT AT BEDTIME 90 Tab 3    hydrocortisone (CORTISONE) 1 % topical cream Apply  to affected area two (2) times daily as needed for Skin Irritation. use thin layer 30 g 11    omeprazole (PRILOSEC OTC) 20 mg tablet Take 20 mg by mouth nightly.        Allergies   Allergen Reactions    Dilaudid [Hydromorphone (Bulk)] Itching     Can tolerate with benadryl and ondansetron    Percocet [Oxycodone-Acetaminophen] Itching     Past Medical History:   Diagnosis Date    Anemia     SC hemoglobinopathy    Chronic pain     Depression     Hypertension     Liver disease     hepatitis C; pt reports \"cured\"    Sickle cell disease ([facility] [address]      Past Surgical History:   Procedure Laterality Date    HX BREAST BIOPSY Right 05/2007    negative    HX CHOLECYSTECTOMY      HX ORTHOPAEDIC      bony curettage of an ostial myelitis focus    [place] BREAST SURGERY PROCEDURE UNLISTED      2 cysts removed from R breast    [place] CHEST SURGERY PROCEDURE UNLISTED      status post thor for removal of a benign      Family History   Problem Relation Age of Onset    Hypertension Mother     Hypertension Father           Objective:     Visit Vitals  /71   Pulse 88   Temp 98 °F (36.7 °C)   SpO2 93%        Physical Examination: General appearance - alert, well appearing, and in no distress  Mental status - alert, oriented to person, place, and time  Neck - supple, no significant adenopathy  Chest - clear to auscultation, no wheezes, rales or rhonchi, symmetric air entry  Heart - normal rate, regular rhythm, normal S1, S2, no murmurs, rubs, clicks or gallops  Abdomen - soft, nontender, nondistended, no masses or organomegaly  Extremities - peripheral pulses normal, no pedal edema, no clubbing or cyanosis       We discussed the expected course, resolution and complications of the diagnosis(es) in detail. Medication risks, benefits, costs, interactions, and alternatives were discussed as indicated. I advised her to contact the office if her condition worsens, changes or fails to improve as anticipated. She expressed understanding with the diagnosis(es) and plan.      Veronica Arthur MD

## 2021-02-08 DIAGNOSIS — D57.00 SICKLE CELL CRISIS (HCC): Primary | ICD-10-CM

## 2021-02-08 DIAGNOSIS — L30.9 DERMATITIS: ICD-10-CM

## 2021-02-08 RX ORDER — FOLIC ACID 1 MG/1
1 TABLET ORAL DAILY
Qty: 30 TAB | Refills: 11 | Status: SHIPPED | OUTPATIENT
Start: 2021-02-08 | End: 2022-04-03

## 2021-02-08 RX ORDER — HYDROCODONE BITARTRATE AND ACETAMINOPHEN 10; 325 MG/1; MG/1
1 TABLET ORAL
Qty: 120 TAB | Refills: 0 | Status: SHIPPED | OUTPATIENT
Start: 2021-02-08 | End: 2021-02-11

## 2021-02-08 RX ORDER — FOLIC ACID 1 MG/1
1 TABLET ORAL DAILY
Status: CANCELLED | OUTPATIENT
Start: 2021-02-08

## 2021-02-08 RX ORDER — HYDROCORTISONE 1 %
CREAM (GRAM) TOPICAL
Qty: 30 G | Refills: 11 | Status: SHIPPED | OUTPATIENT
Start: 2021-02-08

## 2021-02-09 ENCOUNTER — PATIENT OUTREACH (OUTPATIENT)
Dept: CASE MANAGEMENT | Age: 61
End: 2021-02-09

## 2021-02-09 NOTE — PROGRESS NOTES
Patient was admitted to [facility] on 21 and discharged on 21  for Sickle Cell Crisis. Outreach made within 2 business days of discharge: Yes    Top Discharge Challenges to be reviewed by the provider   Additional needs identified to be addressed with provider no  none  Discussed COVID-19 related testing which was available at this time. Test results were negative. Patient informed of results, if available? yes   Method of communication with provider : none       Advance Care Planning:   Does patient have an Advance Directive:  currently not on file; education provided     Inpatient Readmission Risk score: 20%  Was this a readmission? no   Patient stated reason for the admission:   Patients top risk factors for readmission: medical condition  Interventions to address risk factors: Patient already seen by PCP on 21    Care Transition Nurse (CTN) contacted the patient by telephone to perform post hospital discharge assessment. Verified name and  with patient as identifiers. Provided introduction to self, and explanation of the CTN role. CTN reviewed discharge instructions, medical action plan and red flags with patient who verbalized understanding. Patient given an opportunity to ask questions and does not have any further questions or concerns at this time. The patient agrees to contact the PCP office for questions related to their healthcare. Medication reconciliation was performed with patient, who verbalizes understanding of administration of home medications. Referral to Pharm D needed: no     Home Health/Outpatient orders at discharge: none  Durable Medical Equipment ordered at discharge: none  Covid Risk Education    Patient has following risk factors of: HTN and sickle cell. Education provided regarding infection prevention, and signs and symptoms of COVID-19 and when to seek medical attention with patient who verbalized understanding.  Discussed exposure protocols and quarantine From CDC: Are you at higher risk for severe illness?  and given an opportunity for questions and concerns. For more information on steps you can take to protect yourself, see CDC's How to Protect Yourself     Patient/family/caregiver given information for [facility] Bradley and agrees to enroll yes  Patient's preferred e-mail: [email or web address]  Patient's preferred phone number: [phone]    Discussed follow-up appointments. If no appointment was previously scheduled, appointment scheduling offered: yes  [facility] follow up appointment(s):   Future Appointments   Date Time Provider Waldo Calvo   4/9/2021  1:30 PM Jay Cook MD [facility] MAIN [facility]     Non-BS follow up appointment(s): Patient already seen by PCP on 2/5/21. Plan for follow-up call in 10-14 days based on severity of symptoms and risk factors. CTN provided contact information for future needs. Goals Addressed                 This Visit's Progress     Understands red flags post discharge. 02/09/21   -Patient seen by PCP on 2/5 already. Patient denies nausea and vomiting. Eating and drinking well. Continues to have (R) hip pain 3/10 and is using Fentyl patch and Norco PRN. Monitor for nausea and vomiting and (R) hip pain on next call.     Rachelle Hernandez MSN, RN, [facility]  Care Transition Nurse

## 2021-02-09 NOTE — ACP (ADVANCE CARE PLANNING)
Advance Care Planning:   Does patient have an Advance Directive:  currently not on file; education provided. Patient is not  and has 2 adult children and they are her [address] and she has discussed with them her wishes.

## 2021-02-23 ENCOUNTER — PATIENT OUTREACH (OUTPATIENT)
Dept: CASE MANAGEMENT | Age: 61
End: 2021-02-23

## 2021-03-02 ENCOUNTER — PATIENT OUTREACH (OUTPATIENT)
Dept: CASE MANAGEMENT | Age: 61
End: 2021-03-02

## 2021-03-02 NOTE — PROGRESS NOTES
Patient has graduated from the Transitions of Care Coordination  program on 3/2/21. Patient/family has the ability to self-manage at this time Care management goals have been completed. Patient was not referred to the [place] team for further management. Goals Addressed                 This Visit's Progress     COMPLETED: Understands red flags post discharge. 02/09/21   -Patient seen by PCP on 2/5 already. Patient denies nausea and vomiting. Eating and drinking well. Continues to have (R) hip pain 3/10 and is using Fentyl patch and Norco PRN. Monitor for nausea and vomiting and (R) hip pain on next call. Axel PEREZ, RN, [facility]  Care Transition Nurse     02/23/21   Patient feeling a little nauseated and thinks it is something she ate last night. Has taken nausea medication for it. Also C/O scratchy throat this Am. Is watching her 3year old grandson and is wearing her mask. Patient states that he hip pian is better. Monitor status of nausea and hip pain on next call. Axel PEREZ, RN, [facility]  Care Transition Nurse     03/02/21   Patient denies sore throat and nausea, hip pain continues. Axel PEREZ, RN, [facility]  Care Transition Nurse                 Patient has Care Transition Nurse's contact information for any further questions, concerns, or needs.   Patients upcoming visits:    Future Appointments   Date Time Provider Waldo Calvo   4/9/2021  1:30 PM Nica Baiely MD [facility] MAIN [facility]

## 2021-03-09 DIAGNOSIS — D57.00 SICKLE CELL CRISIS (HCC): Primary | ICD-10-CM

## 2021-03-09 RX ORDER — HYDROCODONE BITARTRATE AND ACETAMINOPHEN 10; 325 MG/1; MG/1
1 TABLET ORAL
Qty: 120 TAB | Refills: 0 | Status: SHIPPED | OUTPATIENT
Start: 2021-03-09 | End: 2021-04-08 | Stop reason: SDUPTHER

## 2021-03-22 DIAGNOSIS — D57.00 SICKLE CELL CRISIS (HCC): Primary | ICD-10-CM

## 2021-03-22 RX ORDER — FENTANYL 75 UG/H
1 PATCH TRANSDERMAL
Qty: 10 PATCH | Refills: 0 | Status: SHIPPED | OUTPATIENT
Start: 2021-03-22 | End: 2021-04-21

## 2021-04-08 DIAGNOSIS — D57.00 SICKLE CELL CRISIS (HCC): ICD-10-CM

## 2021-04-08 RX ORDER — HYDROCODONE BITARTRATE AND ACETAMINOPHEN 10; 325 MG/1; MG/1
1 TABLET ORAL
Qty: 120 TAB | Refills: 0 | Status: SHIPPED | OUTPATIENT
Start: 2021-04-08 | End: 2021-05-08

## 2021-04-09 ENCOUNTER — OFFICE VISIT (OUTPATIENT)
Dept: INTERNAL MEDICINE CLINIC | Age: 61
End: 2021-04-09
Payer: MEDICARE

## 2021-04-09 VITALS
HEIGHT: 71 IN | OXYGEN SATURATION: 94 % | BODY MASS INDEX: 29.41 KG/M2 | WEIGHT: 210.1 LBS | RESPIRATION RATE: 16 BRPM | HEART RATE: 91 BPM | DIASTOLIC BLOOD PRESSURE: 90 MMHG | TEMPERATURE: 99 F | SYSTOLIC BLOOD PRESSURE: 143 MMHG

## 2021-04-09 DIAGNOSIS — D57.00 SICKLE CELL PAIN CRISIS (HCC): ICD-10-CM

## 2021-04-09 DIAGNOSIS — Z13.39 SCREENING FOR ALCOHOLISM: ICD-10-CM

## 2021-04-09 DIAGNOSIS — I10 ESSENTIAL HYPERTENSION: Primary | ICD-10-CM

## 2021-04-09 DIAGNOSIS — Z00.00 WELLNESS EXAMINATION: ICD-10-CM

## 2021-04-09 DIAGNOSIS — E66.3 OVERWEIGHT: ICD-10-CM

## 2021-04-09 DIAGNOSIS — F32.9 REACTIVE DEPRESSION: ICD-10-CM

## 2021-04-09 PROCEDURE — G8419 CALC BMI OUT NRM PARAM NOF/U: HCPCS | Performed by: INTERNAL MEDICINE

## 2021-04-09 PROCEDURE — G8755 DIAS BP > OR = 90: HCPCS | Performed by: INTERNAL MEDICINE

## 2021-04-09 PROCEDURE — G8427 DOCREV CUR MEDS BY ELIG CLIN: HCPCS | Performed by: INTERNAL MEDICINE

## 2021-04-09 PROCEDURE — G8753 SYS BP > OR = 140: HCPCS | Performed by: INTERNAL MEDICINE

## 2021-04-09 PROCEDURE — G0439 PPPS, SUBSEQ VISIT: HCPCS | Performed by: INTERNAL MEDICINE

## 2021-04-09 PROCEDURE — 3017F COLORECTAL CA SCREEN DOC REV: CPT | Performed by: INTERNAL MEDICINE

## 2021-04-09 PROCEDURE — G9717 DOC PT DX DEP/BP F/U NT REQ: HCPCS | Performed by: INTERNAL MEDICINE

## 2021-04-09 PROCEDURE — 99214 OFFICE O/P EST MOD 30 MIN: CPT | Performed by: INTERNAL MEDICINE

## 2021-04-09 NOTE — PROGRESS NOTES
Chief Complaint   Patient presents with   [facility] Annual Wellness Visit         1. Have you been to the ER, urgent care clinic since your last visit? Hospitalized since your last visit? No    2. Have you seen or consulted any other health care providers outside of the [address] [place] [address] since your last visit? Include any pap smears or colon screening.  No

## 2021-04-11 NOTE — PATIENT INSTRUCTIONS
Medicare Wellness Visit, Female The best way to live healthy is to have a lifestyle where you eat a well-balanced diet, exercise regularly, limit alcohol use, and quit all forms of tobacco/nicotine, if applicable. Regular preventive services are another way to keep healthy. Preventive services (vaccines, screening tests, monitoring & exams) can help personalize your care plan, which helps you manage your own care. Screening tests can find health problems at the earliest stages, when they are easiest to treat. [facility] follows the current, evidence-based guidelines published by the [facility] ([facility]) when recommending preventive services for our patients. Because we follow these guidelines, sometimes recommendations change over time as research supports it. (For example, mammograms used to be recommended annually. Even though Medicare will still pay for an annual mammogram, the newer guidelines recommend a mammogram every two years for women of average risk). Of course, you and your doctor may decide to screen more often for some diseases, based on your risk and your co-morbidities (chronic disease you are already diagnosed with). Preventive services for you include: - Medicare offers their members a free annual wellness visit, which is time for you and your primary care provider to discuss and plan for your preventive service needs. Take advantage of this benefit every year! 
-All adults over the age of 72 should receive the recommended pneumonia vaccines. Current USPSTF guidelines recommend a series of two vaccines for the best pneumonia protection.  
-All adults should have a flu vaccine yearly and a tetanus vaccine every 10 years.  
-All adults age 48 and older should receive the shingles vaccines (series of two vaccines).      
-All adults age 38-68 who are overweight should have a diabetes screening test once every three years.  
-All adults born between [birth year] and 1963 should be screened once for Hepatitis C. 
-Other screening tests and preventive services for persons with diabetes include: an eye exam to screen for diabetic retinopathy, a kidney function test, a foot exam, and stricter control over your cholesterol.  
-Cardiovascular screening for adults with routine risk involves an electrocardiogram (ECG) at intervals determined by your doctor.  
-Colorectal cancer screenings should be done for adults age 54-65 with no increased risk factors for colorectal cancer. There are a number of acceptable methods of screening for this type of cancer. Each test has its own benefits and drawbacks. Discuss with your doctor what is most appropriate for you during your annual wellness visit. The different tests include: colonoscopy (considered the best screening method), a fecal occult blood test, a fecal DNA test, and sigmoidoscopy. 
 
-A bone mass density test is recommended when a woman turns 65 to screen for osteoporosis. This test is only recommended one time, as a screening. Some providers will use this same test as a disease monitoring tool if you already have osteoporosis. -Breast cancer screenings are recommended every other year for women of normal risk, age 54-69. 
-Cervical cancer screenings for women over age 72 are only recommended with certain risk factors. Here is a list of your current Health Maintenance items (your personalized list of preventive services) with a due date: 
Health Maintenance Due Topic Date Due  Pneumococcal Vaccine (1 of 3 - PCV13) Never done  COVID-19 Vaccine (1) Never done  Shingles Vaccine (1 of 2) Never done  Pap Test  04/02/2018  Mammogram  05/03/2020

## 2021-04-11 NOTE — PROGRESS NOTES
[address] [place] [address] and Primary Care  [place] [address]  [address] [facility] [address]  Phone:  [phone]  Fax: [phone]       Chief Complaint   Patient presents with   [facility] Wellness Visit   . SUBJECTIVE:    John Whalen is a 64 y.o. female comes in for return visit stating that she has done reasonably well. She states that she had prolonged pain even after discharge from the hospital and she is finally at a point where she is at her baseline. She continues analgesics as needed, which allows her to maintain that status. She is reasonably functional at this point. She is ready to interact with her children and grandchildren. She has already had one of her vaccine injections and will get the second one in the very near future. She has a past history of primary hypertension, mild depression and overweight. Current Outpatient Medications   Medication Sig Dispense Refill    HYDROcodone-acetaminophen (NORCO)  mg tablet Take 1 Tab by mouth every six (6) hours as needed for Pain for up to 30 days. Max Daily Amount: 4 Tabs. Dx.d57.00 120 Tab 0    fentaNYL (DURAGESIC) 75 mcg/hr 1 Patch by TransDERmal route every seventy-two (72) hours for 30 days. Max Daily Amount: 1 Patch. Dx.d57.00 10 Patch 0    hydrocortisone (Cortisone) 1 % topical cream Apply  to affected area two (2) times daily as needed for Skin Irritation. use thin layer 30 g 11    folic acid (FOLVITE) 1 mg tablet Take 1 Tab by mouth daily. 30 Tab 11    ondansetron (Zofran ODT) 4 mg disintegrating tablet 1 Tab by SubLINGual route every eight (8) hours as needed for Nausea or Vomiting. 20 Tab 0    hydroxyurea (HYDREA) 500 mg capsule TAKE 1 CAPSULE BY MOUTH TWICE DAILY 60 Cap 11    telmisartan (MICARDIS) 40 mg tablet Take 1 Tab by mouth nightly. 30 Tab 11    promethazine (PHENERGAN) 25 mg tablet Take 1 Tab by mouth every eight (8) hours as needed for Nausea.  90 Tab 11    citalopram (CELEXA) 10 mg tablet TAKE 1 TABLET BY MOUTH EVERY NIGHT AT BEDTIME 90 Tab 3    omeprazole (PRILOSEC OTC) 20 mg tablet Take 20 mg by mouth nightly.        Past Medical History:   Diagnosis Date    Anemia     SC hemoglobinopathy    Chronic pain     Depression     Hypertension     Liver disease     hepatitis C; pt reports \"cured\"    Sickle cell disease ([facility] [address]      Past Surgical History:   Procedure Laterality Date    HX BREAST BIOPSY Right 05/2007    negative    HX CHOLECYSTECTOMY      HX ORTHOPAEDIC      bony curettage of an ostial myelitis focus    [place] BREAST SURGERY PROCEDURE UNLISTED      2 cysts removed from R breast    [place] CHEST SURGERY PROCEDURE UNLISTED      status post thor for removal of a benign      Allergies   Allergen Reactions    Dilaudid [Hydromorphone (Bulk)] Itching     Can tolerate with benadryl and ondansetron    Percocet [Oxycodone-Acetaminophen] Itching         REVIEW OF SYSTEMS:  General: negative for - chills or fever  ENT: negative for - headaches, nasal congestion or tinnitus  Respiratory: negative for - cough, hemoptysis, shortness of breath or wheezing  Cardiovascular : negative for - chest pain, edema, palpitations or shortness of breath  Gastrointestinal: negative for - abdominal pain, blood in stools, heartburn or nausea/vomiting  Genito-Urinary: no dysuria, trouble voiding, or hematuria  Musculoskeletal: negative for - gait disturbance, joint pain, joint stiffness or joint swelling  Neurological: no TIA or stroke symptoms  Hematologic: no bruises, no bleeding, no swollen glands  Integument: no lumps, mole changes, nail changes or rash  Endocrine: no malaise/lethargy or unexpected weight changes      Social History     Socioeconomic History    Marital status:      Spouse name: Not on file    Number of children: 2    Years of education: Not on file    Highest education level: Not on file   Occupational History    Occupation: disabled---Sickle cell disease   Tobacco Use    Smoking status: Never Smoker    Smokeless tobacco: Never Used   Substance and Sexual Activity    Alcohol use: No    Drug use: No    Sexual activity: Yes     Partners: Male     Family History   Problem Relation Age of Onset    Hypertension Mother     Hypertension Father        OBJECTIVE:    Visit Vitals  BP (!) 143/90   Pulse 91   Temp 99 °F (37.2 °C) (Oral)   Resp 16   Ht 5' 11\" (1.803 m)   Wt 210 lb 1.6 oz (95.3 kg)   SpO2 94%   BMI 29.30 kg/m²     CONSTITUTIONAL: well , well nourished, appears age appropriate  EYES: perrla, eom intact  ENMT:moist mucous membranes, pharynx clear  NECK: supple. Thyroid normal  RESPIRATORY: Chest: clear to ascultation and percussion   CARDIOVASCULAR: Heart: regular rate and rhythm  GASTROINTESTINAL: Abdomen: soft, bowel sounds active  HEMATOLOGIC: no pathological lymph nodes palpated  MUSCULOSKELETAL: Extremities: no edema, pulse 1+   INTEGUMENT: No unusual rashes or suspicious skin lesions noted. Nails appear normal.  NEUROLOGIC: non-focal exam   MENTAL STATUS: alert and oriented, appropriate affect      ASSESSMENT:  1. Essential hypertension    2. Sickle cell pain crisis ([address]    3. Overweight    4. Reactive depression    5. Screening for alcoholism    6. Wellness examination        PLAN:  1. Blood pressure is reasonable today. No adjustments are made. 2. As far as her sickle cell disease is concerned, she is reasonably stable. She remains on her analgesics, which helps significantly. 3. I again encouraged weight reduction. This can be accomplished by eating meals, eliminating snacks, and avoiding the consumption of processed carbohydrates. 4. Her depression is also stable. She remains on an anti-depressant. Fortunately, this has not been a limiting factor as far as her existence is concerned. Follow-up and Dispositions    · Return in about 2 months (around 6/9/2021).            Mike Jones MD  This is the Subsequent Medicare Annual Wellness Exam, performed 12 months or more after the Initial AWV or the last Subsequent AWV    I have reviewed the patient's medical history in detail and updated the computerized patient record. Assessment/Plan   Education and counseling provided:  Are appropriate based on today's review and evaluation    1. Essential hypertension  2. Sickle cell pain crisis ([address]  3. Overweight  4. Reactive depression  5. Screening for alcoholism  -     [place] ANNUAL ALCOHOL SCREEN 15 MIN  6. Wellness examination       Depression Risk Factor Screening     3 most recent PHQ Screens 10/3/2014   PHQ Not Done Patient Decline   Little interest or pleasure in doing things Not at all   Feeling down, depressed, irritable, or hopeless Not at all   Total Score PHQ 2 0       Alcohol Risk Screen    Do you average more than 1 drink per night or more than 7 drinks a week:  No    On any one occasion in the past three months have you have had more than 3 drinks containing alcohol:  No        Functional Ability and Level of Safety    Hearing: Hearing is good. Activities of Daily Living: The home contains: no safety equipment. Patient does total self care      Ambulation: with no difficulty     Fall Risk:  No flowsheet data found.    Abuse Screen:  Patient is not abused       Cognitive Screening    Has your family/caregiver stated any concerns about your memory: no     Cognitive Screening: Not applicable    Health Maintenance Due     Health Maintenance Due   Topic Date Due    Pneumococcal 0-64 years (1 of 3 - PCV13) Never done    COVID-19 Vaccine (1) Never done    Shingrix Vaccine Age 50> (1 of 2) Never done    PAP AKA CERVICAL CYTOLOGY  04/02/2018    Breast Cancer Screen Mammogram  05/03/2020       Patient Care Team   Patient Care Team:  Sarah Alarcon MD as PCP - General (Internal Medicine)  Sarah Alarcon MD as PCP - [facility]  Christine Fox RN as Ambulatory Care Manager    History     Patient Active Problem List   Diagnosis Code    Hypertension I10    Venous insufficiency I87.2    Depression F32.9    Sickle cell crisis (HCC) D57.00    Pulsatile tinnitus H93. A9    Carpal tunnel syndrome of right wrist G56.01    Sickle cell pain crisis (HCC) D57.00    Overweight E66.3    Sickle cell disease (HCC) D57.1    Class 1 obesity due to excess calories with serious comorbidity and body mass index (BMI) of 30.0 to 30.9 in adult E66.09, Z68.30    AVN of femur ([facility]) M87.059    Viral gastritis K29.70     Past Medical History:   Diagnosis Date    Anemia     SC hemoglobinopathy    Chronic pain     Depression     Hypertension     Liver disease     hepatitis C; pt reports \"cured\"    Sickle cell disease ([facility] [address]       Past Surgical History:   Procedure Laterality Date    HX BREAST BIOPSY Right 05/2007    negative    HX CHOLECYSTECTOMY      HX ORTHOPAEDIC      bony curettage of an ostial myelitis focus    [place] BREAST SURGERY PROCEDURE UNLISTED      2 cysts removed from R breast    [place] CHEST SURGERY PROCEDURE UNLISTED      status post thor for removal of a benign      Current Outpatient Medications   Medication Sig Dispense Refill    HYDROcodone-acetaminophen (NORCO)  mg tablet Take 1 Tab by mouth every six (6) hours as needed for Pain for up to 30 days. Max Daily Amount: 4 Tabs. Dx.d57.00 120 Tab 0    fentaNYL (DURAGESIC) 75 mcg/hr 1 Patch by TransDERmal route every seventy-two (72) hours for 30 days. Max Daily Amount: 1 Patch. Dx.d57.00 10 Patch 0    hydrocortisone (Cortisone) 1 % topical cream Apply  to affected area two (2) times daily as needed for Skin Irritation. use thin layer 30 g 11    folic acid (FOLVITE) 1 mg tablet Take 1 Tab by mouth daily. 30 Tab 11    ondansetron (Zofran ODT) 4 mg disintegrating tablet 1 Tab by SubLINGual route every eight (8) hours as needed for Nausea or Vomiting. 20 Tab 0    hydroxyurea (HYDREA) 500 mg capsule TAKE 1 CAPSULE BY MOUTH TWICE DAILY 60 Cap 11    telmisartan (MICARDIS) 40 mg tablet Take 1 Tab by mouth nightly. 30 Tab 11    promethazine (PHENERGAN) 25 mg tablet Take 1 Tab by mouth every eight (8) hours as needed for Nausea. 90 Tab 11    citalopram (CELEXA) 10 mg tablet TAKE 1 TABLET BY MOUTH EVERY NIGHT AT BEDTIME 90 Tab 3    omeprazole (PRILOSEC OTC) 20 mg tablet Take 20 mg by mouth nightly.        Allergies   Allergen Reactions    Dilaudid [Hydromorphone (Bulk)] Itching     Can tolerate with benadryl and ondansetron    Percocet [Oxycodone-Acetaminophen] Itching       Family History   Problem Relation Age of Onset    Hypertension Mother     Hypertension Father      Social History     Tobacco Use    Smoking status: Never Smoker    Smokeless tobacco: Never Used   Substance Use Topics    Alcohol use: No         George Ann MD

## 2021-04-21 DIAGNOSIS — F32.A DEPRESSION, UNSPECIFIED DEPRESSION TYPE: ICD-10-CM

## 2021-04-21 RX ORDER — CITALOPRAM 10 MG/1
TABLET ORAL
Qty: 90 TAB | Refills: 3 | Status: SHIPPED | OUTPATIENT
Start: 2021-04-21 | End: 2021-07-10

## 2021-04-23 DIAGNOSIS — D57.00 SICKLE CELL CRISIS (HCC): Primary | ICD-10-CM

## 2021-04-24 RX ORDER — FENTANYL 75 UG/H
1 PATCH TRANSDERMAL
Qty: 10 PATCH | Refills: 0 | Status: SHIPPED | OUTPATIENT
Start: 2021-04-24 | End: 2021-05-24

## 2021-05-12 NOTE — PROGRESS NOTES
Oncology Nursing Communication Tool 10:28 AM 
11/6/2018 Bedside shift change report given to , RN (incoming nurse) by Corrie Dennison RN (outgoing nurse) on [place]. Report included the following information SBAR, Kardex, Intake/Output, MAR, Accordion and Recent Results. Shift Summary: Discharge tomorrow, Thursday; Pain managed Issues for physician to address: Oncology Shift Note Admission Date 10/28/2018 Admission Diagnosis Sickle cell pain crisis ([facility] [address] Code Status Full Code Consults None Cardiac Monitoring [x] Yes [] No  
  
Purposeful Hourly Rounding [x] Yes   
Mukund Score Total Score: 1 Mukund score 3 or > [] Bed Alarm [] Avasys [] 1:1 sitter [] Patient refused (Place signed refusal form in chart) Pain Managed [x] Yes [] No  
 Key Pain Meds HYDROcodone-acetaminophen (NORCO)  mg tablet  (Taking) Take 1 Tab by mouth every six (6) hours as needed. Max Daily Amount: 4 Tabs. fentaNYL (DURAGESIC) 75 mcg/hr  (Taking) 1 Patch by TransDERmal route every seventy-two (72) hours. Max Daily Amount: 1 Patch. Influenza Vaccine Received Flu Vaccine for Current Season (usually Sept-March): No  
 Patient/Guardian Refused (Notify MD): Yes Oxygen needs? [x] Room air Oxygen @  []1L    []2L    []3L   []4L    []5L   []6L Use home O2? [] Yes [] No 
Perform O2 challenge test using  smartphrase (.oxygenchallenge) Last bowel movement Last Bowel Movement Date: 11/01/18 
bowel movement Urinary Catheter LDAs Readmission Risk Assessment Tool Score Medium Risk   
      
 17 Total Score 3 Has Seen PCP in Last 6 Months (Yes=3, No=0) 3 Patient Length of Stay (>5 days = 3) 4 IP Visits Last 12 Months (1-3=4, 4=9, >4=11) 5 Pt. Coverage (Medicare=5 , Medicaid, or Self-Pay=4) 2 Charlson Comorbidity Score (Age + Comorbid Conditions) Criteria that do not apply: . Living with Significant Other. Assisted Living. LTAC. SNF. or  
Rehab Expected Length of Stay 2d 16h Actual Length of Stay 9 Marge Wade RN 
 
 no

## 2021-05-13 DIAGNOSIS — D57.00 SICKLE CELL CRISIS (HCC): Primary | ICD-10-CM

## 2021-05-13 RX ORDER — HYDROCODONE BITARTRATE AND ACETAMINOPHEN 10; 325 MG/1; MG/1
1 TABLET ORAL
Qty: 120 TAB | Refills: 0 | Status: SHIPPED | OUTPATIENT
Start: 2021-05-13 | End: 2021-06-12

## 2021-06-14 DIAGNOSIS — D57.00 SICKLE CELL CRISIS (HCC): Primary | ICD-10-CM

## 2021-06-14 RX ORDER — HYDROCODONE BITARTRATE AND ACETAMINOPHEN 10; 325 MG/1; MG/1
1 TABLET ORAL
Qty: 120 TABLET | Refills: 0 | Status: SHIPPED | OUTPATIENT
Start: 2021-06-14 | End: 2021-07-14 | Stop reason: SDUPTHER

## 2021-06-15 NOTE — PROGRESS NOTES
PCP KRISTINA appt scheduled with Dr. Kenyatta Guardado on .5/9/2019 at 1:15pm Appt added to AVS. LOGAN Winston CM Specialist 
 Treatment Plan/Recommendations:  Continue   - Seroquel 800mg nightly, will switch to extended release  - Belsomra up to 20mg as needed at night for sleep  - clonazepam 1mg as needed for anxiety  - lithium 900mg daily (300mg am and 600mg pm)  - mirtazepin 15mg nightly    -May call office at [phone] or send my chart message if needed  -Medication adjustments will only be done at appointments  -For medication refills please allow for 3 days and refills put in on after business hours (5 pm), holidays, and weekends will not be addressed until the next business day. Please plan accordingly.  -Coping skills discussed and given    Discussed the risks and benefits of psychotropic medications, as well as possible side effects.  If in crisis or feeling suicidal call crisis or suicide hotline, or 911, or go to the nearest emergency room.  Crisis and suicide hotline numbers given National Suicide Hotline 1-922.158.8321 Crisis Text line text HOME to 584956   She expresses understanding.    Follow up in   1 month

## 2021-06-21 DIAGNOSIS — D57.1 HB-SS DISEASE WITHOUT CRISIS (HCC): ICD-10-CM

## 2021-06-21 RX ORDER — PROMETHAZINE HYDROCHLORIDE 25 MG/1
TABLET ORAL
Qty: 90 TABLET | Refills: 11 | Status: SHIPPED | OUTPATIENT
Start: 2021-06-21 | End: 2022-08-26

## 2021-07-09 ENCOUNTER — OFFICE VISIT (OUTPATIENT)
Dept: INTERNAL MEDICINE CLINIC | Age: 61
End: 2021-07-09
Payer: MEDICARE

## 2021-07-09 VITALS — BODY MASS INDEX: 28.53 KG/M2 | RESPIRATION RATE: 16 BRPM | WEIGHT: 203.8 LBS | HEIGHT: 71 IN

## 2021-07-09 DIAGNOSIS — I87.2 VENOUS INSUFFICIENCY: ICD-10-CM

## 2021-07-09 DIAGNOSIS — D57.00 SICKLE CELL DISEASE WITH CRISIS (HCC): ICD-10-CM

## 2021-07-09 DIAGNOSIS — I10 ESSENTIAL HYPERTENSION: ICD-10-CM

## 2021-07-09 DIAGNOSIS — E66.3 OVERWEIGHT: ICD-10-CM

## 2021-07-09 DIAGNOSIS — F32.9 REACTIVE DEPRESSION: Primary | ICD-10-CM

## 2021-07-09 PROCEDURE — G8427 DOCREV CUR MEDS BY ELIG CLIN: HCPCS | Performed by: INTERNAL MEDICINE

## 2021-07-09 PROCEDURE — G9717 DOC PT DX DEP/BP F/U NT REQ: HCPCS | Performed by: INTERNAL MEDICINE

## 2021-07-09 PROCEDURE — 99214 OFFICE O/P EST MOD 30 MIN: CPT | Performed by: INTERNAL MEDICINE

## 2021-07-09 PROCEDURE — G8419 CALC BMI OUT NRM PARAM NOF/U: HCPCS | Performed by: INTERNAL MEDICINE

## 2021-07-09 PROCEDURE — 3017F COLORECTAL CA SCREEN DOC REV: CPT | Performed by: INTERNAL MEDICINE

## 2021-07-09 PROCEDURE — G8756 NO BP MEASURE DOC: HCPCS | Performed by: INTERNAL MEDICINE

## 2021-07-09 NOTE — PROGRESS NOTES
Chief Complaint   Patient presents with    Sickle Cell Disease     Patient is here for a follow up.  Wrist Pain     1. Have you been to the ER, urgent care clinic since your last visit? Hospitalized since your last visit? No    2. Have you seen or consulted any other health care providers outside of the [address] [place] [address] since your last visit? Include any pap smears or colon screening.  No

## 2021-07-10 RX ORDER — FLUOXETINE 10 MG/1
10 CAPSULE ORAL DAILY
Qty: 30 CAPSULE | Refills: 4 | Status: SHIPPED | OUTPATIENT
Start: 2021-07-10 | End: 2022-02-11

## 2021-07-10 NOTE — PROGRESS NOTES
[facility] and Primary Care  [place] [address]  [address] [place] [address]  Phone:  [phone]  Fax: [phone]       Chief Complaint   Patient presents with    Sickle Cell Disease     Patient is here for a follow up.  Wrist Pain   . SUBJECTIVE:    Zackary Odell is a 64 y.o. female comes in for return visit stating that she is doing reasonably well. She admits that she is a bit more depressed than usual primarily because both of her children are not in [place]. Her sickle cell disease has been reasonably stable. She takes analgesics as prescribed. She has a past history of primary hypertension, as well as being overweight. Unfortunately she has not lost any meaningful weight. Physically active either. We talked about this at length. Current Outpatient Medications   Medication Sig Dispense Refill    FLUoxetine (PROzac) 10 mg capsule Take 1 Capsule by mouth daily. 30 Capsule 4    promethazine (PHENERGAN) 25 mg tablet TAKE 1 TABLET BY MOUTH EVERY 8 HOURS AS NEEDED FOR NAUSEA 90 Tablet 11    HYDROcodone-acetaminophen (NORCO)  mg tablet Take 1 Tablet by mouth every six (6) hours as needed for Pain for up to 30 days. Max Daily Amount: 4 Tablets. Dx. Gus Reevesh Gus Reevesh D57.00 120 Tablet 0    hydrocortisone (Cortisone) 1 % topical cream Apply  to affected area two (2) times daily as needed for Skin Irritation. use thin layer 30 g 11    folic acid (FOLVITE) 1 mg tablet Take 1 Tab by mouth daily. 30 Tab 11    ondansetron (Zofran ODT) 4 mg disintegrating tablet 1 Tab by SubLINGual route every eight (8) hours as needed for Nausea or Vomiting. 20 Tab 0    hydroxyurea (HYDREA) 500 mg capsule TAKE 1 CAPSULE BY MOUTH TWICE DAILY 60 Cap 11    telmisartan (MICARDIS) 40 mg tablet Take 1 Tab by mouth nightly. 30 Tab 11    omeprazole (PRILOSEC OTC) 20 mg tablet Take 20 mg by mouth nightly.        Past Medical History:   Diagnosis Date    Anemia     SC hemoglobinopathy    Chronic pain  Depression     Hypertension     Liver disease     hepatitis C; pt reports \"cured\"    Sickle cell disease ([facility] [address]      Past Surgical History:   Procedure Laterality Date    HX BREAST BIOPSY Right 05/2007    negative    HX CHOLECYSTECTOMY      HX ORTHOPAEDIC      bony curettage of an ostial myelitis focus    UT BREAST SURGERY PROCEDURE UNLISTED      2 cysts removed from R breast    UT CHEST SURGERY PROCEDURE UNLISTED      status post thor for removal of a benign      Allergies   Allergen Reactions    Dilaudid [Hydromorphone (Bulk)] Itching     Can tolerate with benadryl and ondansetron    Percocet [Oxycodone-Acetaminophen] Itching         REVIEW OF SYSTEMS:  General: negative for - chills or fever  ENT: negative for - headaches, nasal congestion or tinnitus  Respiratory: negative for - cough, hemoptysis, shortness of breath or wheezing  Cardiovascular : negative for - chest pain, edema, palpitations or shortness of breath  Gastrointestinal: negative for - abdominal pain, blood in stools, heartburn or nausea/vomiting  Genito-Urinary: no dysuria, trouble voiding, or hematuria  Musculoskeletal: negative for - gait disturbance, joint pain, joint stiffness or joint swelling  Neurological: no TIA or stroke symptoms  Hematologic: no bruises, no bleeding, no swollen glands  Integument: no lumps, mole changes, nail changes or rash  Endocrine: no malaise/lethargy or unexpected weight changes      Social History     Socioeconomic History    Marital status:      Spouse name: Not on file    Number of children: 2    Years of education: Not on file    Highest education level: Not on file   Occupational History    Occupation: disabled---Sickle cell disease   Tobacco Use    Smoking status: Never Smoker    Smokeless tobacco: Never Used   Vaping Use    Vaping Use: Never used   Substance and Sexual Activity    Alcohol use: No    Drug use: No    Sexual activity: Yes     Partners: Male     Social Determinants of Health     Financial Resource Strain:     Difficulty of Paying Living Expenses:    Food Insecurity:     Worried About Running Out of Food in the Last Year:     [address] in the Last Year:    Transportation Needs:     Lack of Transportation (Medical):  Lack of Transportation (Non-Medical):    Physical Activity:     Days of Exercise per Week:     Minutes of Exercise per Session:    Stress:     Feeling of Stress :    Social Connections:     Frequency of Communication with Friends and Family:     Frequency of Social Gatherings with Friends and Family:     Attends [employer] Services:     Active Member of Clubs or Organizations:     Attends Club or Organization Meetings:     Marital Status:      Family History   Problem Relation Age of Onset    Hypertension Mother     Hypertension Father        OBJECTIVE:    Visit Vitals  Resp 16   Ht 5' 11\" (1.803 m)   Wt 203 lb 12.8 oz (92.4 kg)   BMI 28.42 kg/m²     CONSTITUTIONAL: well , well nourished, appears age appropriate  EYES: perrla, eom intact  ENMT:moist mucous membranes, pharynx clear  NECK: supple. Thyroid normal  RESPIRATORY: Chest: clear to ascultation and percussion   CARDIOVASCULAR: Heart: regular rate and rhythm  GASTROINTESTINAL: Abdomen: soft, bowel sounds active  HEMATOLOGIC: no pathological lymph nodes palpated  MUSCULOSKELETAL: Extremities: no edema, pulse 1+   INTEGUMENT: No unusual rashes or suspicious skin lesions noted. Nails appear normal.  NEUROLOGIC: non-focal exam   MENTAL STATUS: alert and oriented, appropriate affect      ASSESSMENT:  1. Reactive depression    2. Sickle cell disease with crisis ([address]    3. Overweight    4. Venous insufficiency    5. Essential hypertension        PLAN:  1. As far as her depression is concerned I will discontinue the Celexa in favor or fluoxetine. Hopefully she can tolerate this.   2. Her sickle cell disease is reasonably stable and she will continue her analgesics as prescribed. 3. I encouraged weight reduction. This can be accomplished by eating meals, eliminating snacks, and avoiding the consumption of processed carbohydrates. 4. She does have mild chronic venous insufficiency which is actually worse now as would be expected given the hot humid weather. 5. Blood pressure is reasonable, no adjustments are made. 6. I encouraged to her to increase her physical activity preferable outside on a more consistent basis. Follow-up and Dispositions    · Return in about 3 months (around 10/9/2021).            Azeb Jamil MD

## 2021-07-13 DIAGNOSIS — D57.00 SICKLE CELL CRISIS (HCC): Primary | ICD-10-CM

## 2021-07-13 RX ORDER — FENTANYL 75 UG/H
1 PATCH TRANSDERMAL
Qty: 10 PATCH | Refills: 0 | Status: SHIPPED | OUTPATIENT
Start: 2021-07-13 | End: 2021-08-12

## 2021-07-14 DIAGNOSIS — D57.00 SICKLE CELL CRISIS (HCC): ICD-10-CM

## 2021-07-15 RX ORDER — HYDROCODONE BITARTRATE AND ACETAMINOPHEN 10; 325 MG/1; MG/1
1 TABLET ORAL
Qty: 120 TABLET | Refills: 0 | Status: SHIPPED | OUTPATIENT
Start: 2021-07-15 | End: 2021-08-14

## 2021-07-20 RX ORDER — TELMISARTAN 40 MG/1
40 TABLET ORAL
Qty: 30 TABLET | Refills: 11 | Status: SHIPPED | OUTPATIENT
Start: 2021-07-20 | End: 2022-07-24

## 2021-07-26 ENCOUNTER — OFFICE VISIT (OUTPATIENT)
Dept: INTERNAL MEDICINE CLINIC | Age: 61
End: 2021-07-26
Payer: MEDICARE

## 2021-07-26 VITALS
HEART RATE: 104 BPM | RESPIRATION RATE: 22 BRPM | TEMPERATURE: 99 F | DIASTOLIC BLOOD PRESSURE: 92 MMHG | HEIGHT: 71 IN | OXYGEN SATURATION: 98 % | BODY MASS INDEX: 28.34 KG/M2 | SYSTOLIC BLOOD PRESSURE: 163 MMHG | WEIGHT: 202.4 LBS

## 2021-07-26 DIAGNOSIS — D57.00 SICKLE CELL DISEASE WITH CRISIS (HCC): ICD-10-CM

## 2021-07-26 DIAGNOSIS — F32.9 REACTIVE DEPRESSION: Primary | ICD-10-CM

## 2021-07-26 DIAGNOSIS — I10 ESSENTIAL HYPERTENSION: ICD-10-CM

## 2021-07-26 PROCEDURE — G8753 SYS BP > OR = 140: HCPCS | Performed by: INTERNAL MEDICINE

## 2021-07-26 PROCEDURE — 99213 OFFICE O/P EST LOW 20 MIN: CPT | Performed by: INTERNAL MEDICINE

## 2021-07-26 PROCEDURE — 3017F COLORECTAL CA SCREEN DOC REV: CPT | Performed by: INTERNAL MEDICINE

## 2021-07-26 PROCEDURE — G8427 DOCREV CUR MEDS BY ELIG CLIN: HCPCS | Performed by: INTERNAL MEDICINE

## 2021-07-26 PROCEDURE — G9717 DOC PT DX DEP/BP F/U NT REQ: HCPCS | Performed by: INTERNAL MEDICINE

## 2021-07-26 PROCEDURE — G8755 DIAS BP > OR = 90: HCPCS | Performed by: INTERNAL MEDICINE

## 2021-07-26 PROCEDURE — G8419 CALC BMI OUT NRM PARAM NOF/U: HCPCS | Performed by: INTERNAL MEDICINE

## 2021-07-26 NOTE — PROGRESS NOTES
1. Have you been to the ER, urgent care clinic since your last visit? Hospitalized since your last visit? No    2. Have you seen or consulted any other health care providers outside of the [address] [place] [address] since your last visit? Include any pap smears or colon screening.  No     Wants to discuss medication

## 2021-07-26 NOTE — PROGRESS NOTES
Chief Complaint   Patient presents with    Hypertension   . SUBECTIVE:    Abdifatah Hyde is a 64 y.o. female comes in for return visit stating that she has done reasonably well. She has not transitioned to the new antidepressant. I suggested she wait about two weeks before restarting her antidepressant, which is _____ fluoxetine. She has had it before but could not remember if she had any adverse effects in the medication which I doubt. Her sickle cell disease is doing reasonably well. She has a past history of primary hypertension. Current Outpatient Medications   Medication Sig Dispense Refill    telmisartan (MICARDIS) 40 mg tablet Take 1 Tablet by mouth nightly. 30 Tablet 11    promethazine (PHENERGAN) 25 mg tablet TAKE 1 TABLET BY MOUTH EVERY 8 HOURS AS NEEDED FOR NAUSEA 90 Tablet 11    hydrocortisone (Cortisone) 1 % topical cream Apply  to affected area two (2) times daily as needed for Skin Irritation. use thin layer 30 g 11    folic acid (FOLVITE) 1 mg tablet Take 1 Tab by mouth daily. 30 Tab 11    ondansetron (Zofran ODT) 4 mg disintegrating tablet 1 Tab by SubLINGual route every eight (8) hours as needed for Nausea or Vomiting. 20 Tab 0    hydroxyurea (HYDREA) 500 mg capsule TAKE 1 CAPSULE BY MOUTH TWICE DAILY 60 Cap 11    omeprazole (PRILOSEC OTC) 20 mg tablet Take 20 mg by mouth nightly.  FLUoxetine (PROzac) 10 mg capsule Take 1 Capsule by mouth daily.  (Patient not taking: Reported on 7/26/2021) 30 Capsule 4     Past Medical History:   Diagnosis Date    Anemia     SC hemoglobinopathy    Chronic pain     Depression     Hypertension     Liver disease     hepatitis C; pt reports \"cured\"    Sickle cell disease ([facility]      Past Surgical History:   Procedure Laterality Date    HX BREAST BIOPSY Right 05/2007    negative    HX CHOLECYSTECTOMY      HX ORTHOPAEDIC      bony curettage of an ostial myelitis focus    IL BREAST SURGERY PROCEDURE UNLISTED      2 cysts removed from R breast    PA CHEST SURGERY PROCEDURE UNLISTED      status post thor for removal of a benign      Allergies   Allergen Reactions    Dilaudid [Hydromorphone (Bulk)] Itching     Can tolerate with benadryl and ondansetron    Percocet [Oxycodone-Acetaminophen] Itching       REVIEW OF SYSTEMS:  Review of Systems - Negative except   ENT ROS: negative for - headaches, hearing change, nasal congestion, oral lesions, tinnitus, visual changes or   Respiratory ROS: no cough, shortness of breath, or wheezing  Cardiovascular ROS: no chest pain or dyspnea on exertion  Gastrointestinal ROS: no abdominal pain, change in bowel habits, or black or blood  Genito-Urinary ROS: no dysuria, trouble voiding, or hematuria  Musculoskeletal ROS: negative  Neurological ROS: no TIA or stroke symptoms      Social History     Socioeconomic History    Marital status:      Spouse name: Not on file    Number of children: 2    Years of education: Not on file    Highest education level: Not on file   Occupational History    Occupation: disabled---Sickle cell disease   Tobacco Use    Smoking status: Never Smoker    Smokeless tobacco: Never Used   Vaping Use    Vaping Use: Never used   Substance and Sexual Activity    Alcohol use: No    Drug use: No    Sexual activity: Yes     Partners: Male     Social Determinants of Health     Financial Resource Strain:     Difficulty of Paying Living Expenses:    Food Insecurity:     Worried About Running Out of Food in the Last Year:     Ran Out of Food in the Last Year:    Transportation Needs:     Lack of Transportation (Medical):      Lack of Transportation (Non-Medical):    Physical Activity:     Days of Exercise per Week:     Minutes of Exercise per Session:    Stress:     Feeling of Stress :    Social Connections:     Frequency of Communication with Friends and Family:     Frequency of Social Gatherings with Friends and Family:     Attends Church Services:     Active Member of Clubs or Organizations:     Attends Club or Organization Meetings:     Marital Status:    r  Family History   Problem Relation Age of Onset   Renee Manual Hypertension Mother     Hypertension Father        OBJECTIVE:  Visit Vitals  BP (!) 163/92   Pulse (!) 104   Temp 99 °F (37.2 °C) (Oral)   Resp 22   Ht 5' 11\" (1.803 m)   Wt 202 lb 6.4 oz (91.8 kg)   SpO2 98%   BMI 28.23 kg/m²     ENT: perrla,  eom intact  NECK: supple. Thyroid normal, no JVD  CHEST: clear to ascultation and percussion   HEART: regular rate and rhythm  ABD: soft, bowel sounds active,   EXTREMITIES: no edema, pulse 1+  INTEGUMENT: clear      ASSESSMENT:  1. Reactive depression    2. Sickle cell disease with crisis ([address]    3. Essential hypertension        PLAN:  1. The patient has reactive depression. She should do well with fluoxetine 10 mg daily. I will titrate up if need be. 2. Her sickle cell disease is stable. She remains on her analgesics. 3. Her blood pressure is slightly elevated, but I would not make an adjustment today. If this persists on return visit, then appropriate changes will occur. Follow-up and Dispositions    · Return in about 3 months (around 10/26/2021).            Jeremy Villafana MD

## 2021-08-08 NOTE — PROGRESS NOTES
Patient Active Problem List  
Diagnosis Code  Sickle cell anemia (HCC) D57.1  Hypertension I10  Venous insufficiency I87.2  Hepatitis C B19.20  Depression F32.9  S[facility] crisis ([facility] D57.00  Pulsatile tinnitus H93. A9  
 Carpal tunnel syndrome of right wrist G56.01  
 Sickle cell pain crisis ([facility] D57.00  
 Overweight (BMI 25.0-29. 9) E66.3  Sickle cell disease ([facility] D57.1 Allergies Allergen Reactions  Dilaudid [Hydromorphone (Bulk)] Itching Can tolerate with benadryl and ondansetron  Percocet [Oxycodone-Acetaminophen] Itching Current Facility-Administered Medications:  
  vancomycin (VANCOCIN) 1,000 mg in 0.9% sodium chloride (MBP/ADV) 250 mL, 1,000 mg, IntraVENous, Q8H, Bijan More MD, Last Rate: 125 mL/hr at 02/23/19 0436, 1,000 mg at 02/23/19 0436 
  0.9% sodium chloride infusion 250 mL, 250 mL, IntraVENous, PRN, Bijan More MD 
  lactobac ac& pc-s.therm-b.anim (CARLITOS Huddleston), 1 Cap, Oral, DAILY, Bijan More MD, 1 Cap at 02/22/19 2153 
  HYDROcodone-acetaminophen (NORCO) 7.5-325 mg per tablet 1 Tab, 1 Tab, Oral, Q4H PRN, Bijan More MD 
  cefTRIAXone (ROCEPHIN) 2 g in 0.9% sodium chloride (MBP/ADV) 50 mL, 2 g, IntraVENous, Q24H, Bijan More MD, Last Rate: 100 mL/hr at 02/22/19 1041, 2 g at 02/22/19 1041 
  0.9% sodium chloride infusion 250 mL, 250 mL, IntraVENous, PRN, Bijan More MD 
  acetaminophen (TYLENOL) tablet 650 mg, 650 mg, Oral, Q4H PRN, Bijan More MD, 650 mg at 02/22/19 0844 
  HYDROmorphone (PF) (DILAUDID) injection 1 mg, 1 mg, IntraVENous, Q3H PRN, Bijan More MD, 1 mg at 02/23/19 1827   sodium chloride (NS) flush 10-30 mL, 10-30 mL, InterCATHeter, PRN, Bijan More MD, 10 mL at 02/20/19 2808   sodium chloride (NS) flush 10-40 mL, 10-40 mL, InterCATHeter, Q8H, Bijan More MD, 10 mL at 02/23/19 0503   losartan (COZAAR) tablet 50 mg, 50 mg, Oral, DAILY, Bijan More, MD, 50 mg at 02/22/19 0809 
  dextrose 5% - 0.45% NaCl with KCl 20 mEq/L infusion, 100 mL/hr, IntraVENous, CONTINUOUS, Allison Kulkarni MD, Last Rate: 100 mL/hr at 02/23/19 0436, 100 mL/hr at 02/23/19 2712   citalopram (CELEXA) tablet 10 mg, 10 mg, Oral, DAILY, Dewey Mathew MD, 10 mg at 02/22/19 1113   diphenhydrAMINE (BENADRYL) capsule 50 mg, 50 mg, Oral, DAILY PRN, Dewey Mathew MD, 50 mg at 02/23/19 7643   fentaNYL (DURAGESIC) 75 mcg/hr patch 1 Patch, 1 Patch, TransDERmal, Q72H, Dewey Mathew MD, 1 Patch at 02/22/19 7447   folic acid (FOLVITE) tablet 1 mg, 1 mg, Oral, DAILY, Dewey Mathew MD, 1 mg at 02/22/19 0810 
  sodium chloride (NS) flush 5-40 mL, 5-40 mL, IntraVENous, Q8H, Dewey Mathew MD, 10 mL at 02/23/19 0505   sodium chloride (NS) flush 5-40 mL, 5-40 mL, IntraVENous, PRN, Dewey Mathew MD, 5 mL at 02/17/19 2307   naloxone [facility]) injection 0.4 mg, 0.4 mg, IntraVENous, PRN, Dewey Mathew MD 
  ondansetron [facility]) injection 2 mg, 2 mg, IntraVENous, Q6H PRN, Dewey Mathew MD, 2 mg at 02/23/19 2993   bisacodyl (DULCOLAX) tablet 5 mg, 5 mg, Oral, DAILY PRN, Dewey Mathew MD, 5 mg at 02/21/19 0801 
  nicotine (NICODERM CQ) 7 mg/24 hr patch 1 Patch, 1 Patch, TransDERmal, DAILY PRN, Dewey Mathew MD 
  heparin (porcine) injection 5,000 Units, 5,000 Units, SubCUTAneous, Q8H, Dewey Mathew MD, 5,000 Units at 02/23/19 2127   diphenhydrAMINE (BENADRYL) injection 12.5 mg, 12.5 mg, IntraVENous, Q6H PRN, Dewey Mathew MD, 12.5 mg at 02/22/19 2028   polyethylene glycol (MIRALAX) packet 17 g, 17 g, Oral, DAILY, Dewey Mathew MD, 17 g at 02/22/19 2292   pantoprazole (PROTONIX) tablet 40 mg, 40 mg, Oral, ACB, Dewey Mathew MD, 40 mg at 02/22/19 7951 Recent Results (from the past 24 hour(s)) POST EXPOSURE PROFILE Collection Time: 02/22/19  8:30 AM  
Result Value Ref Range p24 Antigen NONREACTIVE NR    
 HIV-1,2 Ab NONREACTIVE NR Hepatitis C virus Ab >11.00 Index Hep C  virus Ab Interp. REACTIVE (A) NR Hep C  virus Ab comment Method used is [facility] Hepatitis B surface Ag <0.10 Index Hep B surface Ag Interp. NEGATIVE  NEG    
TYPE + CROSSMATCH Collection Time: 02/22/19  5:30 PM  
Result Value Ref Range Crossmatch Expiration 02/25/2019 ABO/Rh(D) A NEGATIVE Antibody screen NEG Physician instructions [address] [facility] Unit number [id number] Blood component type  LR Unit division 00 Status of unit ISSUED ANTIGEN/ANTIBODY INFO C NEGATIVE, 
E NEGATIVE, LINDA NEGATIVE, Crossmatch result Compatible Unit number [id number] Blood component type  LR Unit division 00 Status of unit ISSUED ANTIGEN/ANTIBODY INFO C NEGATIVE, 
E NEGATIVE, LINDA NEGATIVE, Crossmatch result Compatible Patient Vitals for the past 24 hrs: 
 Temp Pulse Resp BP SpO2  
02/23/19 0512 99.1 °F (37.3 °C) 80 17 125/63 99 % 02/23/19 0457 98.8 °F (37.1 °C) 77 16 105/60 97 % 02/23/19 0451 99 °F (37.2 °C) 78 17 127/65 100 % 02/23/19 0434 99.3 °F (37.4 °C) 80 14 118/63 100 % 02/23/19 0417 98.7 °F (37.1 °C) 78 17 106/63 97 % 02/23/19 0114 98.9 °F (37.2 °C) 82 17 109/48 99 % 02/23/19 0054 98.9 °F (37.2 °C) 87 14 (!) 89/50 98 % 02/23/19 0043 98.8 °F (37.1 °C) 84 17 104/51 98 % 02/23/19 0026 99.1 °F (37.3 °C) 86 16 109/64 97 % 02/23/19 0006 98.9 °F (37.2 °C) 82 17 106/51 98 % 02/23/19 0000 98.9 °F (37.2 °C) 82 17 106/51 98 % 02/22/19 2312 98.8 °F (37.1 °C) 84 16 95/53 100 % 02/22/19 1953 98.6 °F (37 °C) 87 16 106/66 98 % 02/22/19 1607 98.6 °F (37 °C) 85 16 97/45 100 % 02/22/19 1228 98.5 °F (36.9 °C) 86 16 113/56 98 % 02/22/19 0706 99.2 °F (37.3 °C) 76 18 120/66 100 % Concerned about weaning of nacotics. Exam: Lungs clear. Heart RRR Impression: 
Stable s/p 2 units of blood Plan: 
Narcotic wean per Dr. Carly Paul none

## 2021-08-31 DIAGNOSIS — D57.00 SICKLE CELL CRISIS (HCC): Primary | ICD-10-CM

## 2021-08-31 RX ORDER — FENTANYL 75 UG/H
1 PATCH TRANSDERMAL
Qty: 10 PATCH | Refills: 0 | Status: SHIPPED | OUTPATIENT
Start: 2021-08-31 | End: 2021-10-06

## 2021-08-31 RX ORDER — HYDROCODONE BITARTRATE AND ACETAMINOPHEN 10; 325 MG/1; MG/1
1 TABLET ORAL
Qty: 120 TABLET | Refills: 0 | Status: SHIPPED | OUTPATIENT
Start: 2021-08-31 | End: 2021-09-03

## 2021-09-03 ENCOUNTER — HOSPITAL ENCOUNTER (EMERGENCY)
Age: 61
Discharge: HOME | End: 2021-09-03
Attending: EMERGENCY MEDICINE
Payer: MEDICARE

## 2021-09-03 ENCOUNTER — APPOINTMENT (OUTPATIENT)
Dept: CT IMAGING | Age: 61
End: 2021-09-03
Attending: EMERGENCY MEDICINE
Payer: MEDICARE

## 2021-09-03 VITALS
BODY MASS INDEX: 28.02 KG/M2 | HEIGHT: 71 IN | DIASTOLIC BLOOD PRESSURE: 87 MMHG | OXYGEN SATURATION: 96 % | HEART RATE: 84 BPM | RESPIRATION RATE: 17 BRPM | SYSTOLIC BLOOD PRESSURE: 150 MMHG | TEMPERATURE: 98.3 F | WEIGHT: 200.18 LBS

## 2021-09-03 DIAGNOSIS — R11.2 NAUSEA AND VOMITING IN ADULT: ICD-10-CM

## 2021-09-03 DIAGNOSIS — R42 VERTIGO: Primary | ICD-10-CM

## 2021-09-03 DIAGNOSIS — D57.00 SICKLE CELL CRISIS (HCC): ICD-10-CM

## 2021-09-03 LAB
ALBUMIN SERPL-MCNC: 3.4 G/DL (ref 3.5–5)
ALBUMIN/GLOB SERPL: 0.6 {RATIO} (ref 1.1–2.2)
ALP SERPL-CCNC: 94 U/L (ref 45–117)
ALT SERPL-CCNC: 17 U/L (ref 12–78)
ANION GAP SERPL CALC-SCNC: 5 MMOL/L (ref 5–15)
AST SERPL-CCNC: 33 U/L (ref 15–37)
ATRIAL RATE: 84 BPM
BASOPHILS # BLD: 0.1 K/UL (ref 0–0.1)
BASOPHILS NFR BLD: 1 % (ref 0–1)
BILIRUB SERPL-MCNC: 2.5 MG/DL (ref 0.2–1)
BUN SERPL-MCNC: 8 MG/DL (ref 6–20)
BUN/CREAT SERPL: 12 (ref 12–20)
CALCIUM SERPL-MCNC: 8.9 MG/DL (ref 8.5–10.1)
CALCULATED P AXIS, ECG09: 52 DEGREES
CALCULATED R AXIS, ECG10: 29 DEGREES
CALCULATED T AXIS, ECG11: 23 DEGREES
CHLORIDE SERPL-SCNC: 106 MMOL/L (ref 97–108)
CO2 SERPL-SCNC: 29 MMOL/L (ref 21–32)
CREAT SERPL-MCNC: 0.68 MG/DL (ref 0.55–1.02)
DIAGNOSIS, 93000: NORMAL
DIFFERENTIAL METHOD BLD: ABNORMAL
EOSINOPHIL # BLD: 0.3 K/UL (ref 0–0.4)
EOSINOPHIL NFR BLD: 3 % (ref 0–7)
ERYTHROCYTE [DISTWIDTH] IN BLOOD BY AUTOMATED COUNT: 12.7 % (ref 11.5–14.5)
ERYTHROCYTE [SEDIMENTATION RATE] IN BLOOD: 56 MM/HR (ref 0–30)
GLOBULIN SER CALC-MCNC: 5.5 G/DL (ref 2–4)
GLUCOSE SERPL-MCNC: 100 MG/DL (ref 65–100)
HCT VFR BLD AUTO: 25.1 % (ref 35–47)
HGB BLD-MCNC: 8.7 G/DL (ref 11.5–16)
IMM GRANULOCYTES # BLD AUTO: 0 K/UL (ref 0–0.04)
IMM GRANULOCYTES NFR BLD AUTO: 0 % (ref 0–0.5)
LYMPHOCYTES # BLD: 3.5 K/UL (ref 0.8–3.5)
LYMPHOCYTES NFR BLD: 43 % (ref 12–49)
MAGNESIUM SERPL-MCNC: 2.3 MG/DL (ref 1.6–2.4)
MCH RBC QN AUTO: 37.8 PG (ref 26–34)
MCHC RBC AUTO-ENTMCNC: 34.7 G/DL (ref 30–36.5)
MCV RBC AUTO: 109.1 FL (ref 80–99)
MONOCYTES # BLD: 1.3 K/UL (ref 0–1)
MONOCYTES NFR BLD: 15 % (ref 5–13)
NEUTS SEG # BLD: 3.2 K/UL (ref 1.8–8)
NEUTS SEG NFR BLD: 38 % (ref 32–75)
NRBC # BLD: 0.28 K/UL (ref 0–0.01)
NRBC BLD-RTO: 3.3 PER 100 WBC
P-R INTERVAL, ECG05: 150 MS
PLATELET # BLD AUTO: 211 K/UL (ref 150–400)
PMV BLD AUTO: 11.4 FL (ref 8.9–12.9)
POTASSIUM SERPL-SCNC: 3.7 MMOL/L (ref 3.5–5.1)
PROT SERPL-MCNC: 8.9 G/DL (ref 6.4–8.2)
Q-T INTERVAL, ECG07: 362 MS
QRS DURATION, ECG06: 78 MS
QTC CALCULATION (BEZET), ECG08: 427 MS
RBC # BLD AUTO: 2.3 M/UL (ref 3.8–5.2)
RBC MORPH BLD: ABNORMAL
RBC MORPH BLD: ABNORMAL
RETICS # AUTO: 0.1 M/UL (ref 0.02–0.08)
RETICS/RBC NFR AUTO: 4.4 % (ref 0.7–2.1)
SODIUM SERPL-SCNC: 140 MMOL/L (ref 136–145)
TROPONIN I SERPL-MCNC: <0.05 NG/ML
VENTRICULAR RATE, ECG03: 84 BPM
WBC # BLD AUTO: 8.4 K/UL (ref 3.6–11)

## 2021-09-03 PROCEDURE — 85025 COMPLETE CBC W/AUTO DIFF WBC: CPT

## 2021-09-03 PROCEDURE — 96372 THER/PROPH/DIAG INJ SC/IM: CPT

## 2021-09-03 PROCEDURE — 85652 RBC SED RATE AUTOMATED: CPT

## 2021-09-03 PROCEDURE — 74011250637 HC RX REV CODE- 250/637: Performed by: EMERGENCY MEDICINE

## 2021-09-03 PROCEDURE — 74011250636 HC RX REV CODE- 250/636: Performed by: EMERGENCY MEDICINE

## 2021-09-03 PROCEDURE — 85045 AUTOMATED RETICULOCYTE COUNT: CPT

## 2021-09-03 PROCEDURE — 36415 COLL VENOUS BLD VENIPUNCTURE: CPT

## 2021-09-03 PROCEDURE — 70450 CT HEAD/BRAIN W/O DYE: CPT

## 2021-09-03 PROCEDURE — 80053 COMPREHEN METABOLIC PANEL: CPT

## 2021-09-03 PROCEDURE — 93005 ELECTROCARDIOGRAM TRACING: CPT

## 2021-09-03 PROCEDURE — 99284 EMERGENCY DEPT VISIT MOD MDM: CPT

## 2021-09-03 PROCEDURE — 84484 ASSAY OF TROPONIN QUANT: CPT

## 2021-09-03 PROCEDURE — 83735 ASSAY OF MAGNESIUM: CPT

## 2021-09-03 RX ORDER — ONDANSETRON 4 MG/1
4 TABLET, ORALLY DISINTEGRATING ORAL
Qty: 10 TABLET | Refills: 0 | Status: SHIPPED | OUTPATIENT
Start: 2021-09-03

## 2021-09-03 RX ORDER — ONDANSETRON 2 MG/ML
4 INJECTION INTRAMUSCULAR; INTRAVENOUS
Status: DISCONTINUED | OUTPATIENT
Start: 2021-09-03 | End: 2021-09-03

## 2021-09-03 RX ORDER — KETOROLAC TROMETHAMINE 30 MG/ML
30 INJECTION, SOLUTION INTRAMUSCULAR; INTRAVENOUS
Status: DISCONTINUED | OUTPATIENT
Start: 2021-09-03 | End: 2021-09-03

## 2021-09-03 RX ORDER — ONDANSETRON 4 MG/1
4 TABLET, ORALLY DISINTEGRATING ORAL
Status: COMPLETED | OUTPATIENT
Start: 2021-09-03 | End: 2021-09-03

## 2021-09-03 RX ORDER — MECLIZINE HCL 12.5 MG 12.5 MG/1
25 TABLET ORAL
Status: COMPLETED | OUTPATIENT
Start: 2021-09-03 | End: 2021-09-03

## 2021-09-03 RX ORDER — KETOROLAC TROMETHAMINE 30 MG/ML
30 INJECTION, SOLUTION INTRAMUSCULAR; INTRAVENOUS
Status: COMPLETED | OUTPATIENT
Start: 2021-09-03 | End: 2021-09-03

## 2021-09-03 RX ORDER — MECLIZINE HYDROCHLORIDE 25 MG/1
25 TABLET ORAL
Qty: 20 TABLET | Refills: 0 | Status: SHIPPED | OUTPATIENT
Start: 2021-09-03 | End: 2022-08-23

## 2021-09-03 RX ADMIN — KETOROLAC TROMETHAMINE 30 MG: 30 INJECTION, SOLUTION INTRAMUSCULAR; INTRAVENOUS at 16:53

## 2021-09-03 RX ADMIN — ONDANSETRON 4 MG: 4 TABLET, ORALLY DISINTEGRATING ORAL at 16:52

## 2021-09-03 RX ADMIN — MECLIZINE 25 MG: 12.5 TABLET ORAL at 16:22

## 2021-09-03 NOTE — ED NOTES
Patient discharge by Dr. Filippo Marquis MD - pt sent to the [facility], with strong and steady gait -  Discharge information / home RX / and reasons to return to the ED were reviewed by the doctor. IV removed. Patient ambulating well  w/o distress. Denies dizziness or shortness of breath. Patient states headache has resolved prior to discharge.

## 2021-09-03 NOTE — ED PROVIDER NOTES
EMERGENCY DEPARTMENT HISTORY AND PHYSICAL EXAM      Date: 9/3/2021  Patient Name: Kaylah Romero    History of Presenting Illness     Chief Complaint   Patient presents with    Dizziness     upon standing x a few days. Starting wednesday this induced vomiting. Today she feels a sickle cell crisis coming on.  Sickle Cell Crisis       History Provided By: Patient    HPI: Kaylah Romero, 64 y.o. female presents to the ED with cc of dizziness, vomiting and joint pain. Patient's dizziness and vomiting started 3 days ago. She feels fine lying down, but when she stands up she becomes nauseated and vomits. She vomited 4 times yesterday, but has not vomited today. She still has mild nausea at this time. She describes her dizziness as a spinning sensation, and it is associated with mild tinnitus. She denies abdominal pain, headache, dysuria or change in bowel habits. She has not taken anything for pain today. Started to feel joint pain in her shoulders, elbows and wrists. She states that is a 5 out of 10 in severity and feels like the beginnings of a sickle cell crisis. She denies cough, chest pain, fever or shortness of breath. There are no other complaints, changes, or physical findings at this time. PCP: Zackary Rios MD    No current facility-administered medications on file prior to encounter. Current Outpatient Medications on File Prior to Encounter   Medication Sig Dispense Refill    HYDROcodone-acetaminophen (NORCO)  mg tablet Take 1 Tablet by mouth every six (6) hours as needed for Pain for up to 3 days. Max Daily Amount: 4 Tablets. Dx.D57.00 120 Tablet 0    fentaNYL (DURAGESIC) 75 mcg/hr 1 Patch by TransDERmal route every seventy-two (72) hours for 30 days. Max Daily Amount: 1 Patch. DX.D57.00 10 Patch 0    telmisartan (MICARDIS) 40 mg tablet Take 1 Tablet by mouth nightly. 30 Tablet 11    FLUoxetine (PROzac) 10 mg capsule Take 1 Capsule by mouth daily.  (Patient not taking: Reported on 7/26/2021) 30 Capsule 4    promethazine (PHENERGAN) 25 mg tablet TAKE 1 TABLET BY MOUTH EVERY 8 HOURS AS NEEDED FOR NAUSEA 90 Tablet 11    hydrocortisone (Cortisone) 1 % topical cream Apply  to affected area two (2) times daily as needed for Skin Irritation. use thin layer 30 g 11    folic acid (FOLVITE) 1 mg tablet Take 1 Tab by mouth daily. 30 Tab 11    ondansetron (Zofran ODT) 4 mg disintegrating tablet 1 Tab by SubLINGual route every eight (8) hours as needed for Nausea or Vomiting. 20 Tab 0    hydroxyurea (HYDREA) 500 mg capsule TAKE 1 CAPSULE BY MOUTH TWICE DAILY 60 Cap 11    omeprazole (PRILOSEC OTC) 20 mg tablet Take 20 mg by mouth nightly. Past History     Past Medical History:  Past Medical History:   Diagnosis Date    Anemia     SC hemoglobinopathy    Chronic pain     Depression     Hypertension     Liver disease     hepatitis C; pt reports \"cured\"    Sickle cell disease ([address]        Past Surgical History:  Past Surgical History:   Procedure Laterality Date    HX BREAST BIOPSY Right 05/2007    negative    HX CHOLECYSTECTOMY      HX ORTHOPAEDIC      bony curettage of an ostial myelitis focus    MT BREAST SURGERY PROCEDURE UNLISTED      2 cysts removed from R breast    MT CHEST SURGERY PROCEDURE UNLISTED      status post thor for removal of a benign        Family History:  Family History   Problem Relation Age of Onset   [facility] Hypertension Mother     Hypertension Father        Social History:  Social History     Tobacco Use    Smoking status: Never Smoker    Smokeless tobacco: Never Used   Vaping Use    Vaping Use: Never used   Substance Use Topics    Alcohol use: No    Drug use: No       Allergies: Allergies   Allergen Reactions    Dilaudid [Hydromorphone (Bulk)] Itching     Can tolerate with benadryl and ondansetron    Percocet [Oxycodone-Acetaminophen] Itching         Review of Systems   Review of Systems   Constitutional: Negative for fever.    HENT: Negative for congestion. Eyes: Negative. Respiratory: Negative for cough and shortness of breath. Cardiovascular: Negative for chest pain. Gastrointestinal: Positive for nausea and vomiting. Endocrine: Negative for heat intolerance. Genitourinary: Negative for dysuria. Musculoskeletal: Negative for back pain. Skin: Negative for rash. Allergic/Immunologic: Negative for immunocompromised state. Neurological: Positive for dizziness. Hematological: Does not bruise/bleed easily. Psychiatric/Behavioral: Negative. All other systems reviewed and are negative. Physical Exam   Physical Exam  Vitals and nursing note reviewed. Constitutional:       General: She is not in acute distress. Appearance: She is well-developed. HENT:      Head: Normocephalic. Eyes:      Extraocular Movements: Extraocular movements intact. Comments: Mild horizontal nystagmus   Cardiovascular:      Rate and Rhythm: Normal rate and regular rhythm. Pulses: Normal pulses. Heart sounds: Normal heart sounds. Pulmonary:      Effort: Pulmonary effort is normal.      Breath sounds: Normal breath sounds. Abdominal:      General: Bowel sounds are normal.      Palpations: Abdomen is soft. Tenderness: There is no abdominal tenderness. Musculoskeletal:         General: Normal range of motion. Cervical back: Normal range of motion and neck supple. Skin:     General: Skin is warm and dry. Neurological:      General: No focal deficit present. Mental Status: She is alert and oriented to person, place, and time.    Psychiatric:         Mood and Affect: Mood normal.         Behavior: Behavior normal.         Diagnostic Study Results     Labs -     Recent Results (from the past 12 hour(s))   EKG, 12 LEAD, INITIAL    Collection Time: 09/03/21 12:22 PM   Result Value Ref Range    Ventricular Rate 84 BPM    Atrial Rate 84 BPM    P-R Interval 150 ms    QRS Duration 78 ms    Q-T Interval 362 ms    QTC Calculation (Bezet) 427 ms    Calculated P Axis 52 degrees    Calculated R Axis 29 degrees    Calculated T Axis 23 degrees    Diagnosis       Normal sinus rhythm  Normal ECG  Confirmed by Ashu Jimenes ([id number]) on 9/3/2021 2:21:50 PM     TROPONIN I    Collection Time: 09/03/21  2:53 PM   Result Value Ref Range    Troponin-I, Qt. <0.05 <1.18 ng/mL   METABOLIC PANEL, COMPREHENSIVE    Collection Time: 09/03/21  2:53 PM   Result Value Ref Range    Sodium 140 136 - 145 mmol/L    Potassium 3.7 3.5 - 5.1 mmol/L    Chloride 106 97 - 108 mmol/L    CO2 29 21 - 32 mmol/L    Anion gap 5 5 - 15 mmol/L    Glucose 100 65 - 100 mg/dL    BUN 8 6 - 20 MG/DL    Creatinine 0.68 0.55 - 1.02 MG/DL    BUN/Creatinine ratio 12 12 - 20      GFR est AA >60 >60 ml/min/1.73m2    GFR est non-AA >60 >60 ml/min/1.73m2    Calcium 8.9 8.5 - 10.1 MG/DL    Bilirubin, total 2.5 (H) 0.2 - 1.0 MG/DL    ALT (SGPT) 17 12 - 78 U/L    AST (SGOT) 33 15 - 37 U/L    Alk. phosphatase 94 45 - 117 U/L    Protein, total 8.9 (H) 6.4 - 8.2 g/dL    Albumin 3.4 (L) 3.5 - 5.0 g/dL    Globulin 5.5 (H) 2.0 - 4.0 g/dL    A-G Ratio 0.6 (L) 1.1 - 2.2     CBC WITH AUTOMATED DIFF    Collection Time: 09/03/21  2:53 PM   Result Value Ref Range    WBC 8.4 3.6 - 11.0 K/uL    RBC 2.30 (L) 3.80 - 5.20 M/uL    HGB 8.7 (L) 11.5 - 16.0 g/dL    HCT 25.1 (L) 35.0 - 47.0 %    .1 (H) 80.0 - 99.0 FL    MCH 37.8 (H) 26.0 - 34.0 PG    MCHC 34.7 30.0 - 36.5 g/dL    RDW 12.7 11.5 - 14.5 %    PLATELET 074 893 - 484 K/uL    MPV 11.4 8.9 - 12.9 FL    NRBC 3.3 (H) 0  WBC    ABSOLUTE NRBC 0.28 (H) 0.00 - 0.01 K/uL    NEUTROPHILS 38 32 - 75 %    LYMPHOCYTES 43 12 - 49 %    MONOCYTES 15 (H) 5 - 13 %    EOSINOPHILS 3 0 - 7 %    BASOPHILS 1 0 - 1 %    IMMATURE GRANULOCYTES 0 0.0 - 0.5 %    ABS. NEUTROPHILS 3.2 1.8 - 8.0 K/UL    ABS. LYMPHOCYTES 3.5 0.8 - 3.5 K/UL    ABS. MONOCYTES 1.3 (H) 0.0 - 1.0 K/UL    ABS. EOSINOPHILS 0.3 0.0 - 0.4 K/UL    ABS. BASOPHILS 0.1 0.0 - 0.1 K/UL    ABS. IMM. GRANS. 0.0 0.00 - 0.04 K/UL    DF SMEAR SCANNED      RBC COMMENTS ANISOCYTOSIS  1+        RBC COMMENTS MACROCYTOSIS  1+       SED RATE (ESR)    Collection Time: 09/03/21  2:53 PM   Result Value Ref Range    Sed rate, automated 56 (H) 0 - 30 mm/hr   MAGNESIUM    Collection Time: 09/03/21  2:53 PM   Result Value Ref Range    Magnesium 2.3 1.6 - 2.4 mg/dL   RETICULOCYTE COUNT    Collection Time: 09/03/21  2:53 PM   Result Value Ref Range    Reticulocyte count 4.4 (H) 0.7 - 2.1 %    Absolute Retic Cnt. 0.1019 (H) 0.0164 - 0.0776 M/ul       Radiologic Studies -   CT HEAD WO CONT   Final Result   Negative. CT Results  (Last 48 hours)    None        CXR Results  (Last 48 hours)    None          Medical Decision Making   I am the first provider for this patient. I reviewed the vital signs, available nursing notes, past medical history, past surgical history, family history and social history. Vital Signs-Reviewed the patient's vital signs. Patient Vitals for the past 12 hrs:   Temp Pulse Resp BP SpO2   09/03/21 1218 99.3 °F (37.4 °C) 99 16 (!) 177/85 97 %       EKG interpretation: (Preliminary)  Rhythm: normal sinus rhythm; and regular . Rate (approx.): 84; Axis: normal; NM interval: normal; QRS interval: normal ; ST/T wave: normal; Other findings: normal.    Records Reviewed: Nursing Notes, Old Medical Records, Previous Radiology Studies and Previous Laboratory Studies    Provider Notes (Medical Decision Making):   Vertigo, CVA, dehydration, electrolyte abnormality, sickle cell crisis,    ED Course:   Initial assessment performed. The patients presenting problems have been discussed, and they are in agreement with the care plan formulated and outlined with them. I have encouraged them to ask questions as they arise throughout their visit. Management plan review:    I reviewed the patient's management plan with her. Her overdose risk score is 190. Progress note:    Patient is feeling better.   Her results were reviewed. She is advised to follow-up and return to ER if worse               Critical Care Time:   0      Disposition:  home    DISCHARGE PLAN:  1. Discharge Medication List as of 9/3/2021  5:36 PM      START taking these medications    Details   meclizine (ANTIVERT) 25 mg tablet Take 1 Tablet by mouth three (3) times daily as needed for Dizziness., Normal, Disp-20 Tablet, R-0      ondansetron (Zofran ODT) 4 mg disintegrating tablet Take 1 Tablet by mouth every eight (8) hours as needed for Nausea., Normal, Disp-10 Tablet, R-0         CONTINUE these medications which have NOT CHANGED    Details   HYDROcodone-acetaminophen (NORCO)  mg tablet Take 1 Tablet by mouth every six (6) hours as needed for Pain for up to 3 days. Max Daily Amount: 4 Tablets. Dx.D57.00, Normal, Disp-120 Tablet, R-0      fentaNYL (DURAGESIC) 75 mcg/hr 1 Patch by TransDERmal route every seventy-two (72) hours for 30 days. Max Daily Amount: 1 Patch. DX.D57.00, Normal, Disp-10 Patch, R-0      telmisartan (MICARDIS) 40 mg tablet Take 1 Tablet by mouth nightly., Normal, Disp-30 Tablet, R-11      FLUoxetine (PROzac) 10 mg capsule Take 1 Capsule by mouth daily. , Normal, Disp-30 Capsule, R-4      hydrocortisone (Cortisone) 1 % topical cream Apply  to affected area two (2) times daily as needed for Skin Irritation. use thin layer, Normal, BALLARD-92 g, F-35      folic acid (FOLVITE) 1 mg tablet Take 1 Tab by mouth daily. , Normal, Disp-30 Tab, R-11      hydroxyurea (HYDREA) 500 mg capsule TAKE 1 CAPSULE BY MOUTH TWICE DAILY, Normal, Disp-60 Cap,R-11      omeprazole (PRILOSEC OTC) 20 mg tablet Take 20 mg by mouth nightly., Historical Med      promethazine (PHENERGAN) 25 mg tablet TAKE 1 TABLET BY MOUTH EVERY 8 HOURS AS NEEDED FOR NAUSEA, Normal, Disp-90 Tablet, R-11           2.    Follow-up Information     Follow up With Specialties Details Why Contact Info    Vince Hanley MD Internal Medicine Call in 3 days For follow-up regarding your elevated bilirubin [place]  [address] [facility]  [phone]      [facility] EMERGENCY DEPT Emergency Medicine  If symptoms worsen [address] [place] [address]  [phone]        3. Return to ED if worse     Diagnosis     Clinical Impression:   1. Vertigo    2. Sickle cell crisis ([address]    3. Nausea and vomiting in adult        Attestations:    Elaine Maynard MD    Please note that this dictation was completed with [employer], the computer voice recognition software. Quite often unanticipated grammatical, syntax, homophones, and other interpretive errors are inadvertently transcribed by the computer software. Please disregard these errors. Please excuse any errors that have escaped final proofreading. Thank you.

## 2021-09-28 ENCOUNTER — HOSPITAL ENCOUNTER (INPATIENT)
Age: 61
LOS: 8 days | Discharge: HOME | DRG: 812 | End: 2021-10-06
Attending: EMERGENCY MEDICINE | Admitting: INTERNAL MEDICINE
Payer: MEDICARE

## 2021-09-28 ENCOUNTER — APPOINTMENT (OUTPATIENT)
Dept: GENERAL RADIOLOGY | Age: 61
DRG: 812 | End: 2021-09-28
Attending: EMERGENCY MEDICINE
Payer: MEDICARE

## 2021-09-28 DIAGNOSIS — D57.00 SICKLE CELL CRISIS (HCC): Primary | ICD-10-CM

## 2021-09-28 DIAGNOSIS — D57.00 SICKLE CELL DISEASE WITH CRISIS (HCC): ICD-10-CM

## 2021-09-28 LAB
ALBUMIN SERPL-MCNC: 3.4 G/DL (ref 3.5–5)
ALBUMIN/GLOB SERPL: 0.6 {RATIO} (ref 1.1–2.2)
ALP SERPL-CCNC: 109 U/L (ref 45–117)
ALT SERPL-CCNC: 25 U/L (ref 12–78)
ANION GAP SERPL CALC-SCNC: 7 MMOL/L (ref 5–15)
AST SERPL-CCNC: 43 U/L (ref 15–37)
BASOPHILS # BLD: 0 K/UL (ref 0–0.1)
BASOPHILS NFR BLD: 1 % (ref 0–1)
BILIRUB SERPL-MCNC: 1.8 MG/DL (ref 0.2–1)
BUN SERPL-MCNC: 8 MG/DL (ref 6–20)
BUN/CREAT SERPL: 13 (ref 12–20)
CALCIUM SERPL-MCNC: 8.5 MG/DL (ref 8.5–10.1)
CHLORIDE SERPL-SCNC: 103 MMOL/L (ref 97–108)
CO2 SERPL-SCNC: 27 MMOL/L (ref 21–32)
CREAT SERPL-MCNC: 0.64 MG/DL (ref 0.55–1.02)
DIFFERENTIAL METHOD BLD: ABNORMAL
EOSINOPHIL # BLD: 0 K/UL (ref 0–0.4)
EOSINOPHIL NFR BLD: 1 % (ref 0–7)
ERYTHROCYTE [DISTWIDTH] IN BLOOD BY AUTOMATED COUNT: 15 % (ref 11.5–14.5)
ERYTHROCYTE [SEDIMENTATION RATE] IN BLOOD: 47 MM/HR (ref 0–30)
GLOBULIN SER CALC-MCNC: 5.5 G/DL (ref 2–4)
GLUCOSE SERPL-MCNC: 106 MG/DL (ref 65–100)
HCT VFR BLD AUTO: 26.9 % (ref 35–47)
HGB BLD-MCNC: 9.4 G/DL (ref 11.5–16)
IMM GRANULOCYTES # BLD AUTO: 0 K/UL (ref 0–0.04)
IMM GRANULOCYTES NFR BLD AUTO: 0 % (ref 0–0.5)
LYMPHOCYTES # BLD: 0.8 K/UL (ref 0.8–3.5)
LYMPHOCYTES NFR BLD: 19 % (ref 12–49)
MCH RBC QN AUTO: 38.4 PG (ref 26–34)
MCHC RBC AUTO-ENTMCNC: 34.9 G/DL (ref 30–36.5)
MCV RBC AUTO: 109.8 FL (ref 80–99)
MONOCYTES # BLD: 0.5 K/UL (ref 0–1)
MONOCYTES NFR BLD: 13 % (ref 5–13)
NEUTS SEG # BLD: 2.9 K/UL (ref 1.8–8)
NEUTS SEG NFR BLD: 66 % (ref 32–75)
NRBC # BLD: 0.61 K/UL (ref 0–0.01)
NRBC BLD-RTO: 14.6 PER 100 WBC
PLATELET # BLD AUTO: 232 K/UL (ref 150–400)
PMV BLD AUTO: 11.1 FL (ref 8.9–12.9)
POTASSIUM SERPL-SCNC: 3.8 MMOL/L (ref 3.5–5.1)
PROT SERPL-MCNC: 8.9 G/DL (ref 6.4–8.2)
RBC # BLD AUTO: 2.45 M/UL (ref 3.8–5.2)
RBC MORPH BLD: ABNORMAL
RETICS # AUTO: 0.12 M/UL (ref 0.02–0.08)
RETICS/RBC NFR AUTO: 5.1 % (ref 0.7–2.1)
SODIUM SERPL-SCNC: 137 MMOL/L (ref 136–145)
WBC # BLD AUTO: 4.2 K/UL (ref 3.6–11)

## 2021-09-28 PROCEDURE — 96375 TX/PRO/DX INJ NEW DRUG ADDON: CPT

## 2021-09-28 PROCEDURE — 96376 TX/PRO/DX INJ SAME DRUG ADON: CPT

## 2021-09-28 PROCEDURE — 96361 HYDRATE IV INFUSION ADD-ON: CPT

## 2021-09-28 PROCEDURE — 80053 COMPREHEN METABOLIC PANEL: CPT

## 2021-09-28 PROCEDURE — 74011250636 HC RX REV CODE- 250/636: Performed by: EMERGENCY MEDICINE

## 2021-09-28 PROCEDURE — 85025 COMPLETE CBC W/AUTO DIFF WBC: CPT

## 2021-09-28 PROCEDURE — 87040 BLOOD CULTURE FOR BACTERIA: CPT

## 2021-09-28 PROCEDURE — 99284 EMERGENCY DEPT VISIT MOD MDM: CPT

## 2021-09-28 PROCEDURE — 85652 RBC SED RATE AUTOMATED: CPT

## 2021-09-28 PROCEDURE — 74011250637 HC RX REV CODE- 250/637: Performed by: INTERNAL MEDICINE

## 2021-09-28 PROCEDURE — 85045 AUTOMATED RETICULOCYTE COUNT: CPT

## 2021-09-28 PROCEDURE — 65270000029 HC RM PRIVATE

## 2021-09-28 PROCEDURE — 74011000258 HC RX REV CODE- 258: Performed by: INTERNAL MEDICINE

## 2021-09-28 PROCEDURE — 74011000250 HC RX REV CODE- 250: Performed by: INTERNAL MEDICINE

## 2021-09-28 PROCEDURE — 71045 X-RAY EXAM CHEST 1 VIEW: CPT

## 2021-09-28 PROCEDURE — 96374 THER/PROPH/DIAG INJ IV PUSH: CPT

## 2021-09-28 PROCEDURE — 74011250636 HC RX REV CODE- 250/636: Performed by: INTERNAL MEDICINE

## 2021-09-28 PROCEDURE — 36415 COLL VENOUS BLD VENIPUNCTURE: CPT

## 2021-09-28 RX ORDER — HYDROMORPHONE HYDROCHLORIDE 1 MG/ML
1 INJECTION, SOLUTION INTRAMUSCULAR; INTRAVENOUS; SUBCUTANEOUS
Status: COMPLETED | OUTPATIENT
Start: 2021-09-28 | End: 2021-09-28

## 2021-09-28 RX ORDER — SODIUM CHLORIDE 0.9 % (FLUSH) 0.9 %
5-40 SYRINGE (ML) INJECTION EVERY 8 HOURS
Status: DISCONTINUED | OUTPATIENT
Start: 2021-09-28 | End: 2021-10-06 | Stop reason: HOSPADM

## 2021-09-28 RX ORDER — PANTOPRAZOLE SODIUM 40 MG/1
40 TABLET, DELAYED RELEASE ORAL
Status: DISCONTINUED | OUTPATIENT
Start: 2021-09-29 | End: 2021-10-06 | Stop reason: HOSPADM

## 2021-09-28 RX ORDER — ONDANSETRON 2 MG/ML
4 INJECTION INTRAMUSCULAR; INTRAVENOUS
Status: DISCONTINUED | OUTPATIENT
Start: 2021-09-28 | End: 2021-10-03 | Stop reason: SDUPTHER

## 2021-09-28 RX ORDER — DIPHENHYDRAMINE HYDROCHLORIDE 50 MG/ML
25 INJECTION, SOLUTION INTRAMUSCULAR; INTRAVENOUS
Status: COMPLETED | OUTPATIENT
Start: 2021-09-28 | End: 2021-09-28

## 2021-09-28 RX ORDER — KETOROLAC TROMETHAMINE 30 MG/ML
15 INJECTION, SOLUTION INTRAMUSCULAR; INTRAVENOUS
Status: DISCONTINUED | OUTPATIENT
Start: 2021-09-28 | End: 2021-09-29

## 2021-09-28 RX ORDER — POLYETHYLENE GLYCOL 3350 17 G/17G
17 POWDER, FOR SOLUTION ORAL DAILY PRN
Status: DISCONTINUED | OUTPATIENT
Start: 2021-09-28 | End: 2021-10-06 | Stop reason: HOSPADM

## 2021-09-28 RX ORDER — FLUOXETINE 10 MG/1
10 CAPSULE ORAL DAILY
Status: DISCONTINUED | OUTPATIENT
Start: 2021-09-29 | End: 2021-10-06 | Stop reason: HOSPADM

## 2021-09-28 RX ORDER — MECLIZINE HYDROCHLORIDE 25 MG/1
25 TABLET ORAL
Status: DISCONTINUED | OUTPATIENT
Start: 2021-09-28 | End: 2021-10-06 | Stop reason: HOSPADM

## 2021-09-28 RX ORDER — ACETAMINOPHEN 325 MG/1
650 TABLET ORAL
Status: DISCONTINUED | OUTPATIENT
Start: 2021-09-28 | End: 2021-10-03

## 2021-09-28 RX ORDER — ACETAMINOPHEN 650 MG/1
650 SUPPOSITORY RECTAL
Status: DISCONTINUED | OUTPATIENT
Start: 2021-09-28 | End: 2021-10-03

## 2021-09-28 RX ORDER — FENTANYL 75 UG/H
1 PATCH TRANSDERMAL
Status: DISCONTINUED | OUTPATIENT
Start: 2021-09-28 | End: 2021-10-06 | Stop reason: HOSPADM

## 2021-09-28 RX ORDER — ENOXAPARIN SODIUM 100 MG/ML
40 INJECTION SUBCUTANEOUS DAILY
Status: DISCONTINUED | OUTPATIENT
Start: 2021-09-29 | End: 2021-10-06 | Stop reason: HOSPADM

## 2021-09-28 RX ORDER — SODIUM CHLORIDE 450 MG/100ML
125 INJECTION, SOLUTION INTRAVENOUS CONTINUOUS
Status: DISCONTINUED | OUTPATIENT
Start: 2021-09-28 | End: 2021-09-29

## 2021-09-28 RX ORDER — ONDANSETRON 2 MG/ML
4 INJECTION INTRAMUSCULAR; INTRAVENOUS
Status: COMPLETED | OUTPATIENT
Start: 2021-09-28 | End: 2021-09-28

## 2021-09-28 RX ORDER — HYDROXYUREA 500 MG/1
500 CAPSULE ORAL 2 TIMES DAILY
Status: DISCONTINUED | OUTPATIENT
Start: 2021-09-28 | End: 2021-10-06 | Stop reason: HOSPADM

## 2021-09-28 RX ORDER — SODIUM CHLORIDE 0.9 % (FLUSH) 0.9 %
5-40 SYRINGE (ML) INJECTION AS NEEDED
Status: DISCONTINUED | OUTPATIENT
Start: 2021-09-28 | End: 2021-10-06 | Stop reason: HOSPADM

## 2021-09-28 RX ORDER — FOLIC ACID 1 MG/1
1 TABLET ORAL DAILY
Status: DISCONTINUED | OUTPATIENT
Start: 2021-09-29 | End: 2021-10-06 | Stop reason: HOSPADM

## 2021-09-28 RX ORDER — HYDROMORPHONE HYDROCHLORIDE 1 MG/ML
0.5 INJECTION, SOLUTION INTRAMUSCULAR; INTRAVENOUS; SUBCUTANEOUS
Status: DISCONTINUED | OUTPATIENT
Start: 2021-09-28 | End: 2021-09-29

## 2021-09-28 RX ORDER — LOSARTAN POTASSIUM 50 MG/1
50 TABLET ORAL
Status: DISCONTINUED | OUTPATIENT
Start: 2021-09-28 | End: 2021-10-03

## 2021-09-28 RX ORDER — ONDANSETRON 4 MG/1
4 TABLET, ORALLY DISINTEGRATING ORAL
Status: DISCONTINUED | OUTPATIENT
Start: 2021-09-28 | End: 2021-10-06 | Stop reason: HOSPADM

## 2021-09-28 RX ORDER — ONDANSETRON 2 MG/ML
4 INJECTION INTRAMUSCULAR; INTRAVENOUS
Status: DISCONTINUED | OUTPATIENT
Start: 2021-09-28 | End: 2021-10-06 | Stop reason: HOSPADM

## 2021-09-28 RX ORDER — HYDROMORPHONE HYDROCHLORIDE 1 MG/ML
0.5 INJECTION, SOLUTION INTRAMUSCULAR; INTRAVENOUS; SUBCUTANEOUS ONCE
Status: COMPLETED | OUTPATIENT
Start: 2021-09-28 | End: 2021-09-28

## 2021-09-28 RX ORDER — DIPHENHYDRAMINE HYDROCHLORIDE 50 MG/ML
12.5 INJECTION, SOLUTION INTRAMUSCULAR; INTRAVENOUS
Status: COMPLETED | OUTPATIENT
Start: 2021-09-28 | End: 2021-09-28

## 2021-09-28 RX ADMIN — KETOROLAC TROMETHAMINE 15 MG: 30 INJECTION, SOLUTION INTRAMUSCULAR; INTRAVENOUS at 22:22

## 2021-09-28 RX ADMIN — DIPHENHYDRAMINE HYDROCHLORIDE 25 MG: 50 INJECTION, SOLUTION INTRAMUSCULAR; INTRAVENOUS at 17:04

## 2021-09-28 RX ADMIN — HYDROMORPHONE HYDROCHLORIDE 1 MG: 1 INJECTION, SOLUTION INTRAMUSCULAR; INTRAVENOUS; SUBCUTANEOUS at 17:05

## 2021-09-28 RX ADMIN — DIPHENHYDRAMINE HYDROCHLORIDE 12.5 MG: 50 INJECTION, SOLUTION INTRAMUSCULAR; INTRAVENOUS at 15:35

## 2021-09-28 RX ADMIN — HYDROMORPHONE HYDROCHLORIDE 0.5 MG: 1 INJECTION, SOLUTION INTRAMUSCULAR; INTRAVENOUS; SUBCUTANEOUS at 19:28

## 2021-09-28 RX ADMIN — HYDROMORPHONE HYDROCHLORIDE 1 MG: 1 INJECTION, SOLUTION INTRAMUSCULAR; INTRAVENOUS; SUBCUTANEOUS at 15:37

## 2021-09-28 RX ADMIN — SODIUM CHLORIDE 125 ML/HR: 4.5 INJECTION, SOLUTION INTRAVENOUS at 20:07

## 2021-09-28 RX ADMIN — LOSARTAN POTASSIUM 50 MG: 50 TABLET, FILM COATED ORAL at 21:37

## 2021-09-28 RX ADMIN — DIPHENHYDRAMINE HYDROCHLORIDE 25 MG: 50 INJECTION, SOLUTION INTRAMUSCULAR; INTRAVENOUS at 19:27

## 2021-09-28 RX ADMIN — SODIUM CHLORIDE 500 ML: 9 INJECTION, SOLUTION INTRAVENOUS at 15:32

## 2021-09-28 RX ADMIN — ONDANSETRON 4 MG: 2 INJECTION INTRAMUSCULAR; INTRAVENOUS at 15:34

## 2021-09-28 RX ADMIN — CEFTRIAXONE 1 G: 1 INJECTION, POWDER, FOR SOLUTION INTRAMUSCULAR; INTRAVENOUS at 20:06

## 2021-09-28 RX ADMIN — HYDROXYUREA 500 MG: 500 CAPSULE ORAL at 22:18

## 2021-09-28 RX ADMIN — Medication 10 ML: at 21:47

## 2021-09-28 NOTE — ED PROVIDER NOTES
EMERGENCY DEPARTMENT HISTORY AND PHYSICAL EXAM      Date: 9/28/2021  Patient Name: Andrei Berrios    History of Presenting Illness     Chief Complaint   Patient presents with    Sickle Cell Crisis     x 2-3 days. pt has pain across both groin areas and in her chest bones and rt arm       History Provided By: Patient    HPI: Andrei Berrios, 64 y.o. female with a past medical history significant for Sickle cell disease chronic pain, hepatitis C, problems as stated below presents to the ED with cc of moderate to severe pain in her both her groin area, chest, and bones over the last 3 days. Patient reports taking her outpatient medications as prescribed. She sees Dr. Enrike Argueta as an outpatient who manages most of her symptoms. She is taking hydroxyurea and her hydrocodone as prescribed as an outpatient. She denies any fever or productive cough. No other associated symptoms. No other exacerbating ameliorating factors. There are no other complaints, changes, or physical findings at this time. PCP: Jay Cook MD    No current facility-administered medications on file prior to encounter. Current Outpatient Medications on File Prior to Encounter   Medication Sig Dispense Refill    meclizine (ANTIVERT) 25 mg tablet Take 1 Tablet by mouth three (3) times daily as needed for Dizziness. 20 Tablet 0    ondansetron (Zofran ODT) 4 mg disintegrating tablet Take 1 Tablet by mouth every eight (8) hours as needed for Nausea. 10 Tablet 0    fentaNYL (DURAGESIC) 75 mcg/hr 1 Patch by TransDERmal route every seventy-two (72) hours for 30 days. Max Daily Amount: 1 Patch. DX.D57.00 10 Patch 0    telmisartan (MICARDIS) 40 mg tablet Take 1 Tablet by mouth nightly. 30 Tablet 11    FLUoxetine (PROzac) 10 mg capsule Take 1 Capsule by mouth daily.  30 Capsule 4    promethazine (PHENERGAN) 25 mg tablet TAKE 1 TABLET BY MOUTH EVERY 8 HOURS AS NEEDED FOR NAUSEA (Patient not taking: Reported on 9/3/2021) 90 Tablet 11    hydrocortisone (Cortisone) 1 % topical cream Apply  to affected area two (2) times daily as needed for Skin Irritation. use thin layer 30 g 11    folic acid (FOLVITE) 1 mg tablet Take 1 Tab by mouth daily. 30 Tab 11    hydroxyurea (HYDREA) 500 mg capsule TAKE 1 CAPSULE BY MOUTH TWICE DAILY 60 Cap 11    omeprazole (PRILOSEC OTC) 20 mg tablet Take 20 mg by mouth nightly. Past History     Past Medical History:  Past Medical History:   Diagnosis Date    Anemia     SC hemoglobinopathy    Chronic pain     Depression     Hypertension     Liver disease     hepatitis C; pt reports \"cured\"    Sickle cell disease ([address]        Past Surgical History:  Past Surgical History:   Procedure Laterality Date    HX BREAST BIOPSY Right 05/2007    negative    HX CHOLECYSTECTOMY      HX ORTHOPAEDIC      bony curettage of an ostial myelitis focus    CA BREAST SURGERY PROCEDURE UNLISTED      2 cysts removed from R breast    CA CHEST SURGERY PROCEDURE UNLISTED      status post thor for removal of a benign        Family History:  Family History   Problem Relation Age of Onset   [facility] Hypertension Mother     Hypertension Father        Social History:  Social History     Tobacco Use    Smoking status: Never Smoker    Smokeless tobacco: Never Used   Vaping Use    Vaping Use: Never used   Substance Use Topics    Alcohol use: No    Drug use: No       Allergies: Allergies   Allergen Reactions    Dilaudid [Hydromorphone (Bulk)] Itching     Can tolerate with benadryl and ondansetron    Percocet [Oxycodone-Acetaminophen] Itching         Review of Systems   Review of Systems   Constitutional: Negative for chills, diaphoresis, fatigue and fever. HENT: Negative for ear pain and sore throat. Eyes: Negative for pain and redness. Respiratory: Negative for cough and shortness of breath. Cardiovascular: Negative for chest pain and leg swelling. Gastrointestinal: Negative for abdominal pain, diarrhea, nausea and vomiting. Endocrine: Negative for cold intolerance and heat intolerance. Genitourinary: Negative for flank pain and hematuria. Musculoskeletal: Positive for back pain. Negative for neck stiffness. Skin: Negative for rash and wound. Neurological: Negative for dizziness, syncope and headaches. All other systems reviewed and are negative. Physical Exam   Physical Exam  Vitals and nursing note reviewed. Constitutional:       General: She is not in acute distress. Appearance: She is well-developed. She is not ill-appearing. HENT:      Head: Normocephalic and atraumatic. Mouth/Throat:      Pharynx: No oropharyngeal exudate. Eyes:      Conjunctiva/sclera: Conjunctivae normal.      Pupils: Pupils are equal, round, and reactive to light. Cardiovascular:      Rate and Rhythm: Normal rate and regular rhythm. Heart sounds: No murmur heard. Pulmonary:      Effort: Pulmonary effort is normal. No respiratory distress. Breath sounds: Normal breath sounds. No wheezing. Abdominal:      General: Bowel sounds are normal. There is no distension. Palpations: Abdomen is soft. Tenderness: There is no abdominal tenderness. Musculoskeletal:         General: No deformity. Normal range of motion. Cervical back: Normal range of motion. Skin:     General: Skin is warm and dry. Findings: No rash. Neurological:      Mental Status: She is alert and oriented to person, place, and time.       Coordination: Coordination normal.   Psychiatric:         Behavior: Behavior normal.         Diagnostic Study Results     Labs -     Recent Results (from the past 24 hour(s))   CBC WITH AUTOMATED DIFF    Collection Time: 09/28/21  1:45 PM   Result Value Ref Range    WBC 4.2 3.6 - 11.0 K/uL    RBC 2.45 (L) 3.80 - 5.20 M/uL    HGB 9.4 (L) 11.5 - 16.0 g/dL    HCT 26.9 (L) 35.0 - 47.0 %    .8 (H) 80.0 - 99.0 FL    MCH 38.4 (H) 26.0 - 34.0 PG    MCHC 34.9 30.0 - 36.5 g/dL    RDW 15.0 (H) 11.5 - 14.5 %    PLATELET 344 626 - 640 K/uL    MPV 11.1 8.9 - 12.9 FL    NRBC 14.6 (H) 0  WBC    ABSOLUTE NRBC 0.61 (H) 0.00 - 0.01 K/uL    NEUTROPHILS 66 32 - 75 %    LYMPHOCYTES 19 12 - 49 %    MONOCYTES 13 5 - 13 %    EOSINOPHILS 1 0 - 7 %    BASOPHILS 1 0 - 1 %    IMMATURE GRANULOCYTES 0 0.0 - 0.5 %    ABS. NEUTROPHILS 2.9 1.8 - 8.0 K/UL    ABS. LYMPHOCYTES 0.8 0.8 - 3.5 K/UL    ABS. MONOCYTES 0.5 0.0 - 1.0 K/UL    ABS. EOSINOPHILS 0.0 0.0 - 0.4 K/UL    ABS. BASOPHILS 0.0 0.0 - 0.1 K/UL    ABS. IMM. GRANS. 0.0 0.00 - 0.04 K/UL    DF SMEAR SCANNED      RBC COMMENTS ANISOCYTOSIS  3+        RBC COMMENTS TARGET CELLS  3+        RBC COMMENTS SICKLE CELLS  PRESENT       METABOLIC PANEL, COMPREHENSIVE    Collection Time: 09/28/21  1:45 PM   Result Value Ref Range    Sodium 137 136 - 145 mmol/L    Potassium 3.8 3.5 - 5.1 mmol/L    Chloride 103 97 - 108 mmol/L    CO2 27 21 - 32 mmol/L    Anion gap 7 5 - 15 mmol/L    Glucose 106 (H) 65 - 100 mg/dL    BUN 8 6 - 20 MG/DL    Creatinine 0.64 0.55 - 1.02 MG/DL    BUN/Creatinine ratio 13 12 - 20      GFR est AA >60 >60 ml/min/1.73m2    GFR est non-AA >60 >60 ml/min/1.73m2    Calcium 8.5 8.5 - 10.1 MG/DL    Bilirubin, total 1.8 (H) 0.2 - 1.0 MG/DL    ALT (SGPT) 25 12 - 78 U/L    AST (SGOT) 43 (H) 15 - 37 U/L    Alk. phosphatase 109 45 - 117 U/L    Protein, total 8.9 (H) 6.4 - 8.2 g/dL    Albumin 3.4 (L) 3.5 - 5.0 g/dL    Globulin 5.5 (H) 2.0 - 4.0 g/dL    A-G Ratio 0.6 (L) 1.1 - 2.2     SED RATE (ESR)    Collection Time: 09/28/21  1:45 PM   Result Value Ref Range    Sed rate, automated 47 (H) 0 - 30 mm/hr   RETICULOCYTE COUNT    Collection Time: 09/28/21  2:28 PM   Result Value Ref Range    Reticulocyte count 5.1 (H) 0.7 - 2.1 %    Absolute Retic Cnt. 0.1234 (H) 0.0164 - 0.0776 M/ul       Radiologic Studies -   XR CHEST PORT   Final Result   1. The lungs are clear acute process.         CT Results  (Last 48 hours)    None        CXR Results  (Last 48 hours) 09/28/21 1606  XR CHEST PORT Final result    Impression:  1. The lungs are clear acute process. Narrative:  EXAM: XR CHEST PORT       INDICATION: sickle cell crisis       COMPARISON: 1/18/2021       FINDINGS: A portable AP radiograph of the chest was obtained at 1541 hours. Heart size is normal. The lungs are clear acute process. No pneumonia. Mild   scarring right mid zone is noted. Areas of sclerosis in the humeral heads are likely related to the patient's no   sickle cell disease. There are bony changes of the thoracic spine consistent   with sickle cell anemia. Medical Decision Making   I am the first provider for this patient. I reviewed the vital signs, available nursing notes, past medical history, past surgical history, family history and social history. Vital Signs-Reviewed the patient's vital signs. Patient Vitals for the past 12 hrs:   Temp Pulse Resp BP SpO2   09/28/21 1700    136/71 100 %   09/28/21 1530    136/74 100 %   09/28/21 1445  88 16 (!) 147/80 100 %   09/28/21 1337 98.8 °F (37.1 °C) (!) 105 16 (!) 158/106 99 %       Records Reviewed: Nursing records and medical records reviewed    MDM:  Sickle cell crisis versus acute/syndrome versus arthralgias myalgias    Provider Notes (Medical Decision Making):   Patient is a 57-year-old female presenting with sickle cell pain crisis. No signs of acute chest syndrome no signs of severe sepsis or shock. Patient received 3 rounds of medications with minimal control of her pain. Lab work is consistent with sickle cell crisis. Will admit to the hospitalist for pain management, IV fluids, and further symptomatic management. ED Course:   Initial assessment performed. The patients presenting problems have been discussed, and they are in agreement with the care plan formulated and outlined with them. I have encouraged them to ask questions as they arise throughout their visit.          Medications Administered diphenhydrAMINE (BENADRYL) injection 12.5 mg     Admin Date  09/28/2021 Action  Given Dose  12.5 mg Route  IntraVENous Administered By  Mindi Woodall RN          diphenhydrAMINE (BENADRYL) injection 25 mg     Admin Date  09/28/2021 Action  Given Dose  25 mg Route  IntraVENous Administered By  Mindi Woodall RN          HYDROmorphone (DILAUDID) injection 1 mg     Admin Date  09/28/2021 Action  Given Dose  1 mg Route  IntraVENous Administered By  Mindi Woodall RN           Admin Date  09/28/2021 Action  Given Dose  1 mg Route  IntraVENous Administered By  Mindi Woodall RN          ondansetron [facility]) injection 4 mg     Admin Date  09/28/2021 Action  Given Dose  4 mg Route  IntraVENous Administered By  Mindi Woodall RN          sodium chloride 0.9 % bolus infusion 500 mL     Admin Date  09/28/2021 Action  New Bag Dose  500 mL Rate  500 mL/hr Route  IntraVENous Administered By  Mindi Woodall RN                    Critical Care:  None      Disposition:  Admit Note:  6:22 PM  Pt is being admitted by hospitalist. The results of their tests and reason(s) for their admission have been discussed with pt and/or available family. They convey agreement and understanding for the need to be admitted and for admission diagnosis. Diagnosis     Clinical Impression:   1. Sickle cell crisis [facility])        Attestations:    Lottie Kennedy MD    Please note that this dictation was completed with [employer], the computer voice recognition software. Quite often unanticipated grammatical, syntax, homophones, and other interpretive errors are inadvertently transcribed by the computer software. Please disregard these errors. Please excuse any errors that have escaped final proofreading. Thank you.

## 2021-09-28 NOTE — H&P
Hospitalist Admission Note    NAME: Barbara Jacobo   :  1960   MRN:  [id number]     Date/Time:  2021 7:24 PM    Patient PCP: Howard Winston MD  ______________________________________________________________________  Given the patient's current clinical presentation, I have a high level of concern for decompensation if discharged from the emergency department. Complex decision making was performed, which includes reviewing the patient's available past medical records, laboratory results, and x-ray films. My assessment of this patient's clinical condition and my plan of care is as follows. Assessment / Plan:  Sickle cell pain crisis, POA  --Continue fentanyl patch, will add Toradol on top of fentanyl patch  --Hypotonic IV fluid hydration  -Stool softener  --Continue hydroxy urea and folic acid. Monitor H&H and transfuse as needed for hemoglobin of less than 7  --Continue to monitor reticulocyte count  -We will add empiric ceftriaxone     Hypertension  --Continue Micardis. Blood pressure currently elevated due to uncontrolled pain.     Depression, chronic, mild  --Continue Celexa     GERD  Continue PPI     History of hepatitis C, cured with Harvoni    Body mass index is 29.83 kg/m².     Code: Full code  DVT prophylaxis: Lovenox  Surrogate decision maker: [facility]     Dr. Nereida Singh to assume patient care tomorrow        Subjective:   CHIEF COMPLAINT: Generalized body pain    HISTORY OF PRESENT ILLNESS:       Barbara Jacobo, 64 y.o. female with a past medical history significant for Sickle cell disease chronic pain, hepatitis C, problems as stated below presents to the ED with cc of moderate to severe pain in her both her groin area, chest, and bones over the last 3 days. Patient reports taking her outpatient medications as prescribed. She sees Dr. Nereida Singh as an outpatient who manages most of her symptoms.   She is taking hydroxyurea and her fentanyl patch as prescribed as an outpatient. She denies any fever or productive cough. No other associated symptoms. No other exacerbating ameliorating factors. Denies any dysuria, urgency or frequency. Denies any upper respiratory symptoms.     There are no other complaints, changes, or physical findings at this time. We were asked to admit for work up and evaluation of the above problems. Past Medical History:   Diagnosis Date    Anemia     SC hemoglobinopathy    Chronic pain     Depression     Hypertension     Liver disease     hepatitis C; pt reports \"cured\"    Sickle cell disease ([address]         Past Surgical History:   Procedure Laterality Date    HX BREAST BIOPSY Right 05/2007    negative    HX CHOLECYSTECTOMY      HX ORTHOPAEDIC      bony curettage of an ostial myelitis focus    CT BREAST SURGERY PROCEDURE UNLISTED      2 cysts removed from R breast    CT CHEST SURGERY PROCEDURE UNLISTED      status post thor for removal of a benign        Social History     Tobacco Use    Smoking status: Never Smoker    Smokeless tobacco: Never Used   Substance Use Topics    Alcohol use: No        Family History   Problem Relation Age of Onset    Hypertension Mother     Hypertension Father    Reviewed  Allergies   Allergen Reactions    Dilaudid [Hydromorphone (Bulk)] Itching     Can tolerate with benadryl and ondansetron    Percocet [Oxycodone-Acetaminophen] Itching        Prior to Admission medications    Medication Sig Start Date End Date Taking? Authorizing Provider   meclizine (ANTIVERT) 25 mg tablet Take 1 Tablet by mouth three (3) times daily as needed for Dizziness. 9/3/21   Jared Botello MD   ondansetron (Zofran ODT) 4 mg disintegrating tablet Take 1 Tablet by mouth every eight (8) hours as needed for Nausea. 9/3/21   Jared Botello MD   fentaNYL (DURAGESIC) 75 mcg/hr 1 Patch by TransDERmal route every seventy-two (72) hours for 30 days. Max Daily Amount: 1 Patch.  DX.D57.00 8/31/21 9/30/21  Elijah Gutierrez Silver Cummings MD   telmisartan (MICARDIS) 40 mg tablet Take 1 Tablet by mouth nightly. 7/20/21   Greg Stallworth MD   FLUoxetine (PROzac) 10 mg capsule Take 1 Capsule by mouth daily. 7/10/21   Greg Stallworth MD   promethazine (PHENERGAN) 25 mg tablet TAKE 1 TABLET BY MOUTH EVERY 8 HOURS AS NEEDED FOR NAUSEA  Patient not taking: Reported on 9/3/2021 6/21/21   Greg Stallworth MD   hydrocortisone (Cortisone) 1 % topical cream Apply  to affected area two (2) times daily as needed for Skin Irritation. use thin layer 2/8/21   Greg Stallworth MD   folic acid (FOLVITE) 1 mg tablet Take 1 Tab by mouth daily. 2/8/21   Greg Stallworth MD   hydroxyurea (HYDREA) 500 mg capsule TAKE 1 CAPSULE BY MOUTH TWICE DAILY 10/31/20   Greg Stallworth MD   omeprazole (PRILOSEC OTC) 20 mg tablet Take 20 mg by mouth nightly. Other, MD Ean       REVIEW OF SYSTEMS:     I am not able to complete the review of systems because: The patient is intubated and sedated    The patient has altered mental status due to his acute medical problems    The patient has baseline aphasia from prior stroke(s)    The patient has baseline dementia and is not reliable historian    The patient is in acute medical distress and unable to provide information           Total of 12 systems reviewed as follows:       POSITIVE= underlined text  Negative = text not underlined  General:  fever, chills, sweats, generalized weakness, weight loss/gain,      loss of appetite.   Generalized body ache  Eyes:    blurred vision, eye pain, loss of vision, double vision  ENT:    rhinorrhea, pharyngitis   Respiratory:   cough, sputum production, SOB, MARS, wheezing, pleuritic pain   Cardiology:   chest pain, palpitations, orthopnea, PND, edema, syncope   Gastrointestinal:  abdominal pain , N/V, diarrhea, dysphagia, constipation, bleeding   Genitourinary:  frequency, urgency, dysuria, hematuria, incontinence   Muskuloskeletal :  arthralgia, myalgia, back pain  Hematology:  easy bruising, nose or gum bleeding, lymphadenopathy   Dermatological: rash, ulceration, pruritis, color change / jaundice  Endocrine:   hot flashes or polydipsia   Neurological:  headache, dizziness, confusion, focal weakness, paresthesia,     Speech difficulties, memory loss, gait difficulty  Psychological: Feelings of anxiety, depression, agitation    Objective:   VITALS:    Visit Vitals  /61   Pulse 88   Temp 98.8 °F (37.1 °C)   Resp 16   Ht 5' 11\" (1.803 m)   Wt 91.3 kg (201 lb 4.5 oz)   SpO2 99%   BMI 28.07 kg/m²       PHYSICAL EXAM:    General:    Alert, cooperative, no distress, appears stated age. HEENT: Atraumatic, anicteric sclerae, pink conjunctivae     No oral ulcers, mucosa moist, throat clear, dentition fair  Neck:  Supple, symmetrical,  thyroid: non tender  Lungs:   Clear to auscultation bilaterally. No Wheezing or Rhonchi. No rales. Chest wall:  No tenderness  No Accessory muscle use. Heart:   Regular  rhythm,  No  murmur   No edema  Abdomen:   Soft, non-tender. Not distended. Bowel sounds normal  Extremities: No cyanosis. No clubbing,      Skin turgor normal, Capillary refill normal, Radial dial pulse 2+  Skin:     Not pale. Not Jaundiced  No rashes   Psych:  Good insight. Not depressed. Not anxious or agitated. Neurologic: EOMs intact. No facial asymmetry. No aphasia or slurred speech. Symmetrical strength, Sensation grossly intact.  Alert and oriented X 4.     _______________________________________________________________________  Care Plan discussed with:    Comments   Patient x    Family      RN x    Care Manager                    Consultant:      _______________________________________________________________________  Expected  Disposition:   Home with Family x   HH/PT/OT/RN    SNF/LTC    JEREMY    ________________________________________________________________________  TOTAL TIME:  36 Minutes    Critical Care Provided     Minutes non procedure based      Comments    x Reviewed previous records   >50% of visit spent in counseling and coordination of care x Discussion with patient and/or family and questions answered       ________________________________________________________________________  Signed: Angella Beatty MD    Procedures: see electronic medical records for all procedures/Xrays and details which were not copied into this note but were reviewed prior to creation of Plan. LAB DATA REVIEWED:    Recent Results (from the past 24 hour(s))   CBC WITH AUTOMATED DIFF    Collection Time: 09/28/21  1:45 PM   Result Value Ref Range    WBC 4.2 3.6 - 11.0 K/uL    RBC 2.45 (L) 3.80 - 5.20 M/uL    HGB 9.4 (L) 11.5 - 16.0 g/dL    HCT 26.9 (L) 35.0 - 47.0 %    .8 (H) 80.0 - 99.0 FL    MCH 38.4 (H) 26.0 - 34.0 PG    MCHC 34.9 30.0 - 36.5 g/dL    RDW 15.0 (H) 11.5 - 14.5 %    PLATELET 126 186 - 073 K/uL    MPV 11.1 8.9 - 12.9 FL    NRBC 14.6 (H) 0  WBC    ABSOLUTE NRBC 0.61 (H) 0.00 - 0.01 K/uL    NEUTROPHILS 66 32 - 75 %    LYMPHOCYTES 19 12 - 49 %    MONOCYTES 13 5 - 13 %    EOSINOPHILS 1 0 - 7 %    BASOPHILS 1 0 - 1 %    IMMATURE GRANULOCYTES 0 0.0 - 0.5 %    ABS. NEUTROPHILS 2.9 1.8 - 8.0 K/UL    ABS. LYMPHOCYTES 0.8 0.8 - 3.5 K/UL    ABS. MONOCYTES 0.5 0.0 - 1.0 K/UL    ABS. EOSINOPHILS 0.0 0.0 - 0.4 K/UL    ABS. BASOPHILS 0.0 0.0 - 0.1 K/UL    ABS. IMM.  GRANS. 0.0 0.00 - 0.04 K/UL    DF SMEAR SCANNED      RBC COMMENTS ANISOCYTOSIS  3+        RBC COMMENTS TARGET CELLS  3+        RBC COMMENTS SICKLE CELLS  PRESENT       METABOLIC PANEL, COMPREHENSIVE    Collection Time: 09/28/21  1:45 PM   Result Value Ref Range    Sodium 137 136 - 145 mmol/L    Potassium 3.8 3.5 - 5.1 mmol/L    Chloride 103 97 - 108 mmol/L    CO2 27 21 - 32 mmol/L    Anion gap 7 5 - 15 mmol/L    Glucose 106 (H) 65 - 100 mg/dL    BUN 8 6 - 20 MG/DL    Creatinine 0.64 0.55 - 1.02 MG/DL    BUN/Creatinine ratio 13 12 - 20      GFR est AA >60 >60 ml/min/1.73m2    GFR est non-AA >60 >60 ml/min/1.73m2    Calcium 8.5 8.5 - 10.1 MG/DL    Bilirubin, total 1.8 (H) 0.2 - 1.0 MG/DL    ALT (SGPT) 25 12 - 78 U/L    AST (SGOT) 43 (H) 15 - 37 U/L    Alk.  phosphatase 109 45 - 117 U/L    Protein, total 8.9 (H) 6.4 - 8.2 g/dL    Albumin 3.4 (L) 3.5 - 5.0 g/dL    Globulin 5.5 (H) 2.0 - 4.0 g/dL    A-G Ratio 0.6 (L) 1.1 - 2.2     SED RATE (ESR)    Collection Time: 09/28/21  1:45 PM   Result Value Ref Range    Sed rate, automated 47 (H) 0 - 30 mm/hr   RETICULOCYTE COUNT    Collection Time: 09/28/21  2:28 PM   Result Value Ref Range    Reticulocyte count 5.1 (H) 0.7 - 2.1 %    Absolute Retic Cnt. 0.1234 (H) 0.0164 - 0.0776 M/ul

## 2021-09-29 LAB
ERYTHROCYTE [DISTWIDTH] IN BLOOD BY AUTOMATED COUNT: 15.3 % (ref 11.5–14.5)
HCT VFR BLD AUTO: 25.4 % (ref 35–47)
HGB BLD-MCNC: 8.7 G/DL (ref 11.5–16)
MCH RBC QN AUTO: 38.2 PG (ref 26–34)
MCHC RBC AUTO-ENTMCNC: 34.3 G/DL (ref 30–36.5)
MCV RBC AUTO: 111.4 FL (ref 80–99)
NRBC # BLD: 0.57 K/UL (ref 0–0.01)
NRBC BLD-RTO: 8.3 PER 100 WBC
PLATELET # BLD AUTO: 186 K/UL (ref 150–400)
PMV BLD AUTO: 11.1 FL (ref 8.9–12.9)
RBC # BLD AUTO: 2.28 M/UL (ref 3.8–5.2)
WBC # BLD AUTO: 6.9 K/UL (ref 3.6–11)

## 2021-09-29 PROCEDURE — 74011250636 HC RX REV CODE- 250/636: Performed by: INTERNAL MEDICINE

## 2021-09-29 PROCEDURE — 74011000258 HC RX REV CODE- 258: Performed by: INTERNAL MEDICINE

## 2021-09-29 PROCEDURE — 74011000250 HC RX REV CODE- 250: Performed by: INTERNAL MEDICINE

## 2021-09-29 PROCEDURE — 36415 COLL VENOUS BLD VENIPUNCTURE: CPT

## 2021-09-29 PROCEDURE — 74011250636 HC RX REV CODE- 250/636: Performed by: EMERGENCY MEDICINE

## 2021-09-29 PROCEDURE — 85027 COMPLETE CBC AUTOMATED: CPT

## 2021-09-29 PROCEDURE — 74011250637 HC RX REV CODE- 250/637: Performed by: INTERNAL MEDICINE

## 2021-09-29 PROCEDURE — 99232 SBSQ HOSP IP/OBS MODERATE 35: CPT | Performed by: INTERNAL MEDICINE

## 2021-09-29 PROCEDURE — 65270000029 HC RM PRIVATE

## 2021-09-29 RX ORDER — DEXTROSE, SODIUM CHLORIDE, AND POTASSIUM CHLORIDE 5; .45; .15 G/100ML; G/100ML; G/100ML
50 INJECTION INTRAVENOUS CONTINUOUS
Status: DISCONTINUED | OUTPATIENT
Start: 2021-09-29 | End: 2021-10-05

## 2021-09-29 RX ORDER — HYDROMORPHONE HYDROCHLORIDE 2 MG/ML
0.5 INJECTION, SOLUTION INTRAMUSCULAR; INTRAVENOUS; SUBCUTANEOUS
Status: DISCONTINUED | OUTPATIENT
Start: 2021-09-29 | End: 2021-10-03

## 2021-09-29 RX ORDER — DIPHENHYDRAMINE HYDROCHLORIDE 50 MG/ML
12.5 INJECTION, SOLUTION INTRAMUSCULAR; INTRAVENOUS
Status: DISCONTINUED | OUTPATIENT
Start: 2021-09-29 | End: 2021-10-06 | Stop reason: HOSPADM

## 2021-09-29 RX ORDER — HYDROMORPHONE HYDROCHLORIDE 2 MG/ML
0.5 INJECTION, SOLUTION INTRAMUSCULAR; INTRAVENOUS; SUBCUTANEOUS
Status: DISCONTINUED | OUTPATIENT
Start: 2021-09-29 | End: 2021-09-29

## 2021-09-29 RX ADMIN — ONDANSETRON 4 MG: 2 INJECTION INTRAMUSCULAR; INTRAVENOUS at 23:47

## 2021-09-29 RX ADMIN — CEFTRIAXONE 1 G: 1 INJECTION, POWDER, FOR SOLUTION INTRAMUSCULAR; INTRAVENOUS at 18:10

## 2021-09-29 RX ADMIN — SODIUM CHLORIDE 125 ML/HR: 4.5 INJECTION, SOLUTION INTRAVENOUS at 16:11

## 2021-09-29 RX ADMIN — Medication 10 ML: at 22:12

## 2021-09-29 RX ADMIN — MECLIZINE HYDROCHLORIDE 25 MG: 25 TABLET ORAL at 17:47

## 2021-09-29 RX ADMIN — HYDROXYUREA 500 MG: 500 CAPSULE ORAL at 17:40

## 2021-09-29 RX ADMIN — Medication 10 ML: at 15:52

## 2021-09-29 RX ADMIN — DIPHENHYDRAMINE HYDROCHLORIDE 12.5 MG: 50 INJECTION, SOLUTION INTRAMUSCULAR; INTRAVENOUS at 22:13

## 2021-09-29 RX ADMIN — POTASSIUM CHLORIDE, DEXTROSE MONOHYDRATE AND SODIUM CHLORIDE 100 ML/HR: 150; 5; 450 INJECTION, SOLUTION INTRAVENOUS at 23:53

## 2021-09-29 RX ADMIN — KETOROLAC TROMETHAMINE 15 MG: 30 INJECTION, SOLUTION INTRAMUSCULAR; INTRAVENOUS at 04:32

## 2021-09-29 RX ADMIN — KETOROLAC TROMETHAMINE 15 MG: 30 INJECTION, SOLUTION INTRAMUSCULAR; INTRAVENOUS at 15:46

## 2021-09-29 RX ADMIN — HYDROMORPHONE HYDROCHLORIDE 0.5 MG: 2 INJECTION, SOLUTION INTRAMUSCULAR; INTRAVENOUS; SUBCUTANEOUS at 17:46

## 2021-09-29 RX ADMIN — HYDROMORPHONE HYDROCHLORIDE 0.5 MG: 2 INJECTION INTRAMUSCULAR; INTRAVENOUS; SUBCUTANEOUS at 22:14

## 2021-09-29 RX ADMIN — FLUOXETINE 10 MG: 10 CAPSULE ORAL at 10:58

## 2021-09-29 RX ADMIN — Medication 10 ML: at 04:33

## 2021-09-29 RX ADMIN — PANTOPRAZOLE SODIUM 40 MG: 40 TABLET, DELAYED RELEASE ORAL at 07:15

## 2021-09-29 RX ADMIN — LOSARTAN POTASSIUM 50 MG: 50 TABLET, FILM COATED ORAL at 22:12

## 2021-09-29 RX ADMIN — ONDANSETRON HYDROCHLORIDE 4 MG: 2 INJECTION, SOLUTION INTRAMUSCULAR; INTRAVENOUS at 15:48

## 2021-09-29 RX ADMIN — FOLIC ACID 1 MG: 1 TABLET ORAL at 10:58

## 2021-09-29 RX ADMIN — ENOXAPARIN SODIUM 40 MG: 40 INJECTION SUBCUTANEOUS at 10:58

## 2021-09-29 NOTE — PROGRESS NOTES
End of Shift Note         Bedside shift change report given to Marcia RN by Poppy  RN (offgoing nurse). Report included the following information SBAR, Kardex, Intake/Output and MAR     Shift worked:  7a-7p      Shift summary and any significant changes:     0700 Report rec'd from Ret[facility]. 706 [facility]  team regarding IV placement. 3600 [facility] team and  regarding still needing IV placement. 2640 [facility] Johny Tracy to make sure he was aware pt was admitted yesterday to hospital. He is now aware and will round on pt early tomorrow am.  12 Messaged Dr. Jarrell Reeder regarding getting PO meds for pt until IV access is re-established. 1530 IV access attained by another RN (PICC team.) Able to medicate pt effectively for pain. [phone] Dr. Jarrell Reeder rounded on pt.  1800 Dr. Destiney Tracy rounded on pt.    Concerns for physician to address: Pain meds      Zone phone for oncoming shift:  [phone]

## 2021-09-29 NOTE — PROGRESS NOTES
Primary Nurse Carrie Egan RN and Hamilton Suarez RN performed a dual skin assessment on this patient No impairment noted. Skin dry and intact. All scars noted on the abdomen and right breast.  Nuno score is 23.

## 2021-09-29 NOTE — PROGRESS NOTES
Hospitalist Progress Note    NAME: Ciera Chatterjee   :  1960   MRN:  [id number]       Assessment / Plan:  Sickle cell pain crisis, POA  --Continue fentanyl patch, will add Toradol on top of fentanyl patch  --Hypotonic IV fluid hydration  -Stool softener  --Continue hydroxy urea and folic acid.  Monitor H&H and transfuse as needed for hemoglobin of less than 7, Hb currently stable  --Continue to monitor reticulocyte count  -cont empiric ceftriaxone  -CXR no acute process, denies SOB/CP     Hypertension  --Continue Micardis.  Blood pressure currently elevated due to uncontrolled pain.     Depression, chronic, mild  --Continue Celexa     GERD  Continue PPI     History of hepatitis C, cured with Harvoni     Body mass index is 29.83 kg/m².     Code: Full code  DVT prophylaxis: Lovenox  Surrogate decision maker: Daughter Marian Ramos  Dr. Anton Chaudhary to assume patient care tomorrow     Subjective:     Chief Complaint / Reason for Physician Visit  Difficulty getting IV access and pt notes mod pain while awaiting prn pain medicine. Has IV placed just prior to encounter. No other acute complaints. Denies SOB, chest pain, fever, headache    Discussed with RN events overnight. Review of Systems:  Symptom Y/N Comments  Symptom Y/N Comments   Fever/Chills n   Chest Pain n    Poor Appetite n   Edema     Cough n   Abdominal Pain n    Sputum n   Joint Pain     SOB/MARS n   Pruritis/Rash     Nausea/vomit    Tolerating PT/OT     Diarrhea    Tolerating Diet y    Constipation    Other       Could NOT obtain due to:      Objective:     VITALS:   Last 24hrs VS reviewed since prior progress note.  Most recent are:  Patient Vitals for the past 24 hrs:   Temp Pulse Resp BP SpO2   21 1523 98.5 °F (36.9 °C) 90 20 (!) 151/75 90 %   21 0848    (!) 152/90    21 0844 98.1 °F (36.7 °C) 78 20 (!) 182/101 95 %   21 2245 98.7 °F (37.1 °C) 84 18 (!) 145/92 100 %   21 2152 98.7 °F (37.1 °C) 83 18 (!) 137/101 100 %   09/28/21 2000 98.6 °F (37 °C) 98 18 137/71 98 %   09/28/21 1900    131/61 99 %   09/28/21 1800    132/71 99 %   09/28/21 1700    136/71 100 %       Intake/Output Summary (Last 24 hours) at 9/29/2021 1612  Last data filed at 9/28/2021 1632  Gross per 24 hour   Intake 500 ml   Output    Net 500 ml        I had a face to face encounter and independently examined this patient on 9/29/2021, as outlined below:  PHYSICAL EXAM:  General: WD, WN. Alert, cooperative, no acute distress    EENT:  EOMI. Anicteric sclerae. MMM  Resp:  CTA bilaterally, no wheezing or rales. No accessory muscle use  CV:  Regular  rhythm,  No edema  GI:  Soft, Non distended, Non tender. +Bowel sounds  Neurologic:  Alert and oriented X 3, normal speech,   Psych:   Good insight. Not anxious nor agitated  Skin:  No rashes. No jaundice    Reviewed most current lab test results and cultures  YES  Reviewed most current radiology test results   YES  Review and summation of old records today    NO  Reviewed patient's current orders and MAR    YES  PMH/ reviewed - no change compared to H&P  ________________________________________________________________________  Care Plan discussed with:    Comments   Patient x    Family      RN x    Care Manager     Consultant                       x Multidiciplinary team rounds were held today with , nursing, pharmacist and clinical coordinator. Patient's plan of care was discussed; medications were reviewed and discharge planning was addressed.      ________________________________________________________________________  Total NON critical care TIME:  30   Minutes    Total CRITICAL CARE TIME Spent:   Minutes non procedure based      Comments   >50% of visit spent in counseling and coordination of care x    ________________________________________________________________________  Enolia Chyle, DO     Procedures: see electronic medical records for all procedures/Xrays and details which were not copied into this note but were reviewed prior to creation of Plan. LABS:  I reviewed today's most current labs and imaging studies.   Pertinent labs include:  Recent Labs     09/29/21  0400 09/28/21  1345   WBC 6.9 4.2   HGB 8.7* 9.4*   HCT 25.4* 26.9*    232     Recent Labs     09/28/21  1345      K 3.8      CO2 27   *   BUN 8   CREA 0.64   CA 8.5   ALB 3.4*   TBILI 1.8*   ALT 25       Signed: Sedrick Jones DO

## 2021-09-29 NOTE — ED NOTES
TRANSFER - OUT REPORT:    Verbal report given to Monika Carson RN(name) on Thomas Nicole  being transferred to med-surg(unit) for routine progression of care       Report consisted of patients Situation, Background, Assessment and   Recommendations(SBAR). Information from the following report(s) SBAR, ED Summary, Intake/Output, MAR and Recent Results was reviewed with the receiving nurse. Lines:   Peripheral IV 09/28/21 Left Antecubital (Active)   Site Assessment Clean, dry, & intact 09/28/21 1443   Phlebitis Assessment 0 09/28/21 1443   Infiltration Assessment 0 09/28/21 1443   Dressing Status Clean, dry, & intact 09/28/21 1443        Opportunity for questions and clarification was provided.       Patient transported with:  transport

## 2021-09-30 LAB
BASOPHILS # BLD: 0.1 K/UL (ref 0–0.1)
BASOPHILS NFR BLD: 1 % (ref 0–1)
BLASTS NFR BLD MANUAL: 0 %
DIFFERENTIAL METHOD BLD: ABNORMAL
EOSINOPHIL # BLD: 0.1 K/UL (ref 0–0.4)
EOSINOPHIL NFR BLD: 1 % (ref 0–7)
ERYTHROCYTE [DISTWIDTH] IN BLOOD BY AUTOMATED COUNT: 15.2 % (ref 11.5–14.5)
HCT VFR BLD AUTO: 24.4 % (ref 35–47)
HGB BLD-MCNC: 8.4 G/DL (ref 11.5–16)
IMM GRANULOCYTES # BLD AUTO: 0 K/UL
IMM GRANULOCYTES NFR BLD AUTO: 0 %
LYMPHOCYTES # BLD: 2.5 K/UL (ref 0.8–3.5)
LYMPHOCYTES NFR BLD: 54 % (ref 12–49)
MCH RBC QN AUTO: 38.2 PG (ref 26–34)
MCHC RBC AUTO-ENTMCNC: 34.4 G/DL (ref 30–36.5)
MCV RBC AUTO: 110.9 FL (ref 80–99)
METAMYELOCYTES NFR BLD MANUAL: 0 %
MONOCYTES # BLD: 0.8 K/UL (ref 0–1)
MONOCYTES NFR BLD: 15 % (ref 5–13)
MYELOCYTES NFR BLD MANUAL: 0 %
NEUTS BAND NFR BLD MANUAL: 0 % (ref 0–6)
NEUTS SEG # BLD: 1.5 K/UL (ref 1.8–8)
NEUTS SEG NFR BLD: 29 % (ref 32–75)
NRBC # BLD: 0.42 K/UL (ref 0–0.01)
NRBC BLD-RTO: 8.4 PER 100 WBC
OTHER CELLS NFR BLD MANUAL: 0 %
PLATELET # BLD AUTO: 207 K/UL (ref 150–400)
PLATELET COMMENTS,PCOM: ABNORMAL
PMV BLD AUTO: 11.3 FL (ref 8.9–12.9)
PROMYELOCYTES NFR BLD MANUAL: 0 %
RBC # BLD AUTO: 2.2 M/UL (ref 3.8–5.2)
RBC MORPH BLD: ABNORMAL
RETICS # AUTO: 0.1 M/UL (ref 0.02–0.08)
RETICS/RBC NFR AUTO: 4.7 % (ref 0.7–2.1)
WBC # BLD AUTO: 5 K/UL (ref 3.6–11)
WBC MORPH BLD: ABNORMAL

## 2021-09-30 PROCEDURE — 74011250636 HC RX REV CODE- 250/636: Performed by: INTERNAL MEDICINE

## 2021-09-30 PROCEDURE — 99232 SBSQ HOSP IP/OBS MODERATE 35: CPT | Performed by: INTERNAL MEDICINE

## 2021-09-30 PROCEDURE — 65270000029 HC RM PRIVATE

## 2021-09-30 PROCEDURE — 74011000258 HC RX REV CODE- 258: Performed by: INTERNAL MEDICINE

## 2021-09-30 PROCEDURE — 74011250636 HC RX REV CODE- 250/636: Performed by: EMERGENCY MEDICINE

## 2021-09-30 PROCEDURE — 36415 COLL VENOUS BLD VENIPUNCTURE: CPT

## 2021-09-30 PROCEDURE — 85027 COMPLETE CBC AUTOMATED: CPT

## 2021-09-30 PROCEDURE — 85045 AUTOMATED RETICULOCYTE COUNT: CPT

## 2021-09-30 PROCEDURE — 74011250637 HC RX REV CODE- 250/637: Performed by: INTERNAL MEDICINE

## 2021-09-30 RX ADMIN — ENOXAPARIN SODIUM 40 MG: 40 INJECTION SUBCUTANEOUS at 09:11

## 2021-09-30 RX ADMIN — CEFTRIAXONE 1 G: 1 INJECTION, POWDER, FOR SOLUTION INTRAMUSCULAR; INTRAVENOUS at 19:42

## 2021-09-30 RX ADMIN — Medication 10 ML: at 15:20

## 2021-09-30 RX ADMIN — HYDROXYUREA 500 MG: 500 CAPSULE ORAL at 09:10

## 2021-09-30 RX ADMIN — HYDROMORPHONE HYDROCHLORIDE 0.5 MG: 2 INJECTION INTRAMUSCULAR; INTRAVENOUS; SUBCUTANEOUS at 08:55

## 2021-09-30 RX ADMIN — Medication 10 ML: at 06:52

## 2021-09-30 RX ADMIN — FOLIC ACID 1 MG: 1 TABLET ORAL at 09:11

## 2021-09-30 RX ADMIN — POTASSIUM CHLORIDE, DEXTROSE MONOHYDRATE AND SODIUM CHLORIDE 100 ML/HR: 150; 5; 450 INJECTION, SOLUTION INTRAVENOUS at 19:42

## 2021-09-30 RX ADMIN — HYDROMORPHONE HYDROCHLORIDE 0.5 MG: 2 INJECTION INTRAMUSCULAR; INTRAVENOUS; SUBCUTANEOUS at 21:43

## 2021-09-30 RX ADMIN — DIPHENHYDRAMINE HYDROCHLORIDE 12.5 MG: 50 INJECTION, SOLUTION INTRAMUSCULAR; INTRAVENOUS at 08:55

## 2021-09-30 RX ADMIN — HYDROXYUREA 500 MG: 500 CAPSULE ORAL at 17:21

## 2021-09-30 RX ADMIN — DIPHENHYDRAMINE HYDROCHLORIDE 12.5 MG: 50 INJECTION, SOLUTION INTRAMUSCULAR; INTRAVENOUS at 13:10

## 2021-09-30 RX ADMIN — Medication 10 ML: at 21:45

## 2021-09-30 RX ADMIN — ONDANSETRON 4 MG: 2 INJECTION INTRAMUSCULAR; INTRAVENOUS at 10:06

## 2021-09-30 RX ADMIN — FLUOXETINE 10 MG: 10 CAPSULE ORAL at 09:11

## 2021-09-30 RX ADMIN — HYDROMORPHONE HYDROCHLORIDE 0.5 MG: 2 INJECTION INTRAMUSCULAR; INTRAVENOUS; SUBCUTANEOUS at 02:24

## 2021-09-30 RX ADMIN — DIPHENHYDRAMINE HYDROCHLORIDE 12.5 MG: 50 INJECTION, SOLUTION INTRAMUSCULAR; INTRAVENOUS at 21:42

## 2021-09-30 RX ADMIN — DIPHENHYDRAMINE HYDROCHLORIDE 12.5 MG: 50 INJECTION, SOLUTION INTRAMUSCULAR; INTRAVENOUS at 17:21

## 2021-09-30 RX ADMIN — LOSARTAN POTASSIUM 50 MG: 50 TABLET, FILM COATED ORAL at 21:42

## 2021-09-30 RX ADMIN — HYDROMORPHONE HYDROCHLORIDE 0.5 MG: 2 INJECTION INTRAMUSCULAR; INTRAVENOUS; SUBCUTANEOUS at 13:09

## 2021-09-30 RX ADMIN — PANTOPRAZOLE SODIUM 40 MG: 40 TABLET, DELAYED RELEASE ORAL at 07:11

## 2021-09-30 RX ADMIN — POTASSIUM CHLORIDE, DEXTROSE MONOHYDRATE AND SODIUM CHLORIDE 100 ML/HR: 150; 5; 450 INJECTION, SOLUTION INTRAVENOUS at 10:06

## 2021-09-30 RX ADMIN — DIPHENHYDRAMINE HYDROCHLORIDE 12.5 MG: 50 INJECTION, SOLUTION INTRAMUSCULAR; INTRAVENOUS at 02:23

## 2021-09-30 RX ADMIN — HYDROMORPHONE HYDROCHLORIDE 0.5 MG: 2 INJECTION INTRAMUSCULAR; INTRAVENOUS; SUBCUTANEOUS at 17:22

## 2021-09-30 RX ADMIN — ONDANSETRON 4 MG: 2 INJECTION INTRAMUSCULAR; INTRAVENOUS at 18:28

## 2021-09-30 NOTE — PROGRESS NOTES
End of Shift Note    Bedside shift change report given to  (oncoming nurse) by Javier Sy RN (offgoing nurse). Report included the following information SBAR    Shift worked:  11a-7p     Shift summary and any significant changes:    Monitored pain and administered medications per orders per protocol, assisted in ADLs PRN, pt resting comfortably in room   Concerns for physician to address:       Zone phone for oncoming shift:   [phone]       Activity:  Activity Level: Up ad erum  Number times ambulated in hallways past shift: 0  Number of times OOB to chair past shift: 0    Cardiac:   Cardiac Monitoring: No           Access:   Current line(s): PIV     Genitourinary:   Urinary status: voiding    Respiratory:   O2 Device: None (Room air)  Chronic home O2 use?: NO  Incentive spirometer at bedside: NO     GI:  Last Bowel Movement Date: 09/28/21  Current diet:  ADULT DIET Regular  Passing flatus: YES  Tolerating current diet: YES       Pain Management:   Patient states pain is manageable on current regimen: YES    Skin:  Nuno Score: 21  Interventions: increase time out of bed    Patient Safety:  Fall Score:  Total Score: 1  Interventions: bed/chair alarm, assistive device (walker, cane, etc), gripper socks and pt to call before getting OOB       Length of Stay:  Expected LOS: 2d 16h  Actual LOS: 2      Javier Sy RN

## 2021-09-30 NOTE — PROGRESS NOTES
Transition of Care Plan:    RUR: 15% low risk  Disposition: Home with family assistance  Follow up appointments: PCP  DME needed: No needs identified at this time  Transportation at Discharge: Pt's brother or a friend  Randal De La Torreter or means to access home:  Pt has keys      IM Medicare Letter: To be given prior to d/c  Is patient a BCPI-A Bundle:     No      If yes, was Bundle Letter given?:     Caregiver Contact: Margareth Gaona ([phone])  Discharge Caregiver contacted prior to discharge? Pt's cg will be contacted prior to d/c    Reason for Admission:  Sickle cell crisis                     RUR Score:     15%                Plan for utilizing home health:   No needs identified       PCP: First and Last name:  Caitlin Wiley MD     Name of Practice: [address] [place] [address] and Primary Care   Are you a current patient: Yes/No: Yes   Approximate date of last visit: 7/26/21   Can you participate in a virtual visit with your PCP: No                    Current Advanced Directive/Advance Care Plan: Full Code      Healthcare Decision Maker:              Primary Decision Maker: Sherron Lewis - Daughter - [phone]                  Transition of Care Plan:     Home with family assistance    CM met with patient at the bedside to complete initial assessment. CM introduced role, verified demographics, and discussed discharge planning. Pt is a 63 yo female with an admitting diagnosis of Sickle cell crisis. Pt uses [address] [place] [address] on [facility]. Pt lives alone in a 1 story home with 4 JIE. Pt is independent of all ADL/IADLs and drives. Pt has no home DME and no hx of HH, SNF, or IPR. Pt's brother or a friend will transport her home at d/c. Unit CM will continue to follow for discharge planning while patient is admitted on current unit. Please contact Unit CM with questions or issues related to discharge. Care Management Interventions  PCP Verified by CM:  Yes (Dr. John Flores)  Last Visit to PCP: 07/26/21  Mode of Transport at Discharge: Other (see comment) (Pt's brother or friend)  Transition of Care Consult (CM Consult): Discharge Planning  Discharge Durable Medical Equipment: No  Physical Therapy Consult: No  Occupational Therapy Consult: No  Speech Therapy Consult: No  Support Systems: Child(mary), Other Family Member(s)  Confirm Follow Up Transport: Self  Discharge Location  Discharge Placement: Home with family assistance   -----------------------------------------  Laura Nickerson (ACP) Conversation      Date of Conversation: 9/30/2021  Conducted with: Patient with Decision Making Capacity    Healthcare Decision Maker:     Primary Decision Maker: Sherron Lewis - Daughter - [phone]  Click here to complete [place] including selection of the [place] Relationship (ie \"Primary\")      Today we documented Decision Maker(s) consistent with Legal Next of Kin hierarchy.     Content/Action Overview:   DECLINED ACP conversation - will revisit periodically   Reviewed DNR/DNI and patient elects Full Code (Attempt Resuscitation)    Length of Voluntary ACP Conversation in minutes:  <16 minutes (Non-Billable)    TEMITOPE Page  Care Manager [facility]  [phone]

## 2021-09-30 NOTE — PROGRESS NOTES
End of Shift Note    Bedside shift change report given to Huma Proctor RN (oncoming nurse) by Angelica Escamilla RN (offgoing nurse).   Report included the following information SBAR and Kardex    Shift worked:  [id number]     Shift summary and any significant changes:     Patient treated for pain with PRN medication     Concerns for physician to address:  none     Zone phone for oncoming shift:   [address] RN

## 2021-09-30 NOTE — PROGRESS NOTES
General Daily Progress Note    Admit Date: 9/28/2021    Subjective:     Patient complains of generalized pain especially in the back region. Current Facility-Administered Medications   Medication Dose Route Frequency    HYDROmorphone (DILAUDID) injection 0.5 mg  0.5 mg IntraVENous Q4H PRN    diphenhydrAMINE (BENADRYL) injection 12.5 mg  12.5 mg IntraVENous Q4H PRN    dextrose 5% - 0.45% NaCl with KCl 20 mEq/L infusion  100 mL/hr IntraVENous CONTINUOUS    acetaminophen (TYLENOL) tablet 650 mg  650 mg Oral Q6H PRN    ondansetron (ZOFRAN) injection 4 mg  4 mg IntraVENous Q4H PRN    fentaNYL (DURAGESIC) 75 mcg/hr patch 1 Patch  1 Patch TransDERmal Q72H    FLUoxetine (PROzac) capsule 10 mg  10 mg Oral DAILY    folic acid (FOLVITE) tablet 1 mg  1 mg Oral DAILY    hydroxyurea (HYDREA) chemo cap 500 mg  500 mg Oral BID    meclizine (ANTIVERT) tablet 25 mg  25 mg Oral TID PRN    pantoprazole (PROTONIX) tablet 40 mg  40 mg Oral ACB    losartan (COZAAR) tablet 50 mg  50 mg Oral QHS    sodium chloride (NS) flush 5-40 mL  5-40 mL IntraVENous Q8H    sodium chloride (NS) flush 5-40 mL  5-40 mL IntraVENous PRN    acetaminophen (TYLENOL) tablet 650 mg  650 mg Oral Q6H PRN    Or    acetaminophen (TYLENOL) suppository 650 mg  650 mg Rectal Q6H PRN    polyethylene glycol (MIRALAX) packet 17 g  17 g Oral DAILY PRN    ondansetron (ZOFRAN ODT) tablet 4 mg  4 mg Oral Q8H PRN    Or    ondansetron (ZOFRAN) injection 4 mg  4 mg IntraVENous Q6H PRN    enoxaparin (LOVENOX) injection 40 mg  40 mg SubCUTAneous DAILY    cefTRIAXone (ROCEPHIN) 1 g in 0.9% sodium chloride (MBP/ADV) 50 mL MBP  1 g IntraVENous Q24H        Review of Systems  A comprehensive review of systems was negative.     Objective:     Patient Vitals for the past 24 hrs:   BP Temp Pulse Resp SpO2   09/30/21 0924 (!) 150/94 98.2 °F (36.8 °C) 76 16 98 %   09/30/21 0809 (!) 166/112 98.1 °F (36.7 °C) 82 16 98 %   09/29/21 1920 132/79 98.2 °F (36.8 °C) 84 20 97 %   09/29/21 1523 (!) 151/75 98.5 °F (36.9 °C) 90 20 90 %     No intake/output data recorded. No intake/output data recorded. Physical Exam:   Visit Vitals  BP (!) 150/94   Pulse 76   Temp 98.2 °F (36.8 °C)   Resp 16   Ht 5' 11\" (1.803 m)   Wt 201 lb 4.5 oz (91.3 kg)   SpO2 98%   Breastfeeding No   BMI 28.07 kg/m²     General appearance: alert, cooperative, no distress, appears stated age  Neck: supple, symmetrical, trachea midline, no adenopathy, thyroid: not enlarged, symmetric, no tenderness/mass/nodules, no carotid bruit and no JVD  Lungs: clear to auscultation bilaterally  Heart: regular rate and rhythm, S1, S2 normal, no murmur, click, rub or gallop  Abdomen: soft, non-tender. Bowel sounds normal. No masses,  no organomegaly  Extremities: extremities normal, atraumatic, no cyanosis or edema    Assessment:     Active Problems:    [facility])        Plan:     1. Continue treatment of sickle cell crises. 2.  Hydration continues.

## 2021-09-30 NOTE — PROGRESS NOTES
General Daily Progress Note    Admit Date: 9/28/2021    Subjective:     Patient c/o pain. .     Current Facility-Administered Medications   Medication Dose Route Frequency    HYDROmorphone (DILAUDID) injection 0.5 mg  0.5 mg IntraVENous Q4H PRN    diphenhydrAMINE (BENADRYL) injection 12.5 mg  12.5 mg IntraVENous Q4H PRN    acetaminophen (TYLENOL) tablet 650 mg  650 mg Oral Q6H PRN    ondansetron (ZOFRAN) injection 4 mg  4 mg IntraVENous Q4H PRN    fentaNYL (DURAGESIC) 75 mcg/hr patch 1 Patch  1 Patch TransDERmal Q72H    FLUoxetine (PROzac) capsule 10 mg  10 mg Oral DAILY    folic acid (FOLVITE) tablet 1 mg  1 mg Oral DAILY    hydroxyurea (HYDREA) chemo cap 500 mg  500 mg Oral BID    meclizine (ANTIVERT) tablet 25 mg  25 mg Oral TID PRN    pantoprazole (PROTONIX) tablet 40 mg  40 mg Oral ACB    losartan (COZAAR) tablet 50 mg  50 mg Oral QHS    sodium chloride (NS) flush 5-40 mL  5-40 mL IntraVENous Q8H    sodium chloride (NS) flush 5-40 mL  5-40 mL IntraVENous PRN    acetaminophen (TYLENOL) tablet 650 mg  650 mg Oral Q6H PRN    Or    acetaminophen (TYLENOL) suppository 650 mg  650 mg Rectal Q6H PRN    polyethylene glycol (MIRALAX) packet 17 g  17 g Oral DAILY PRN    ondansetron (ZOFRAN ODT) tablet 4 mg  4 mg Oral Q8H PRN    Or    ondansetron (ZOFRAN) injection 4 mg  4 mg IntraVENous Q6H PRN    enoxaparin (LOVENOX) injection 40 mg  40 mg SubCUTAneous DAILY    cefTRIAXone (ROCEPHIN) 1 g in 0.9% sodium chloride (MBP/ADV) 50 mL MBP  1 g IntraVENous Q24H    0.45% sodium chloride infusion  125 mL/hr IntraVENous CONTINUOUS        Review of Systems  A comprehensive review of systems was negative.     Objective:     Patient Vitals for the past 24 hrs:   BP Temp Pulse Resp SpO2   09/29/21 1920 132/79 98.2 °F (36.8 °C) 84 20 97 %   09/29/21 1523 (!) 151/75 98.5 °F (36.9 °C) 90 20 90 %   09/29/21 0848 (!) 152/90       09/29/21 0844 (!) 182/101 98.1 °F (36.7 °C) 78 20 95 %   09/28/21 2245 (!) 145/92 98.7 °F (37.1 °C) 84 18 100 %   09/28/21 2152 (!) 137/101 98.7 °F (37.1 °C) 83 18 100 %     No intake/output data recorded. 09/28 0701 - 09/29 1900  In: 500 [I.V.:500]  Out: -     Physical Exam:   Visit Vitals  /79   Pulse 84   Temp 98.2 °F (36.8 °C)   Resp 20   Ht 5' 11\" (1.803 m)   Wt 201 lb 4.5 oz (91.3 kg)   SpO2 97%   Breastfeeding No   BMI 28.07 kg/m²     General appearance: alert, cooperative, no distress, appears stated age  Neck: supple, symmetrical, trachea midline, no adenopathy, thyroid: not enlarged, symmetric, no tenderness/mass/nodules, no carotid bruit and no JVD  Lungs: clear to auscultation bilaterally  Heart: regular rate and rhythm, S1, S2 normal, no murmur, click, rub or gallop  Abdomen: soft, non-tender. Bowel sounds normal. No masses,  no organomegaly  Extremities: extremities normal, atraumatic, no cyanosis or edema    Assessment:     Active Problems:    Sickle cell crisis ([facility] (5/22/2017)        Plan: 1. Events surrounding admission noted. Recalcitrant pain occurred prompting admission.

## 2021-09-30 NOTE — PROGRESS NOTES
shift change report given to Jag Johnston RN (oncoming nurse) by Miguel Kuo. Jessi Gerardo RN (offgoing nurse). Report included the following information: Started on D5 1/2 NS with 20 K at 100 ml and patient tolerated well. Pain management under control with ordered Dilaudid combined with Benadryl. Unable to get the ordered labs as patient is a very hard stick. In coming nurse to follow up.

## 2021-10-01 LAB
ALBUMIN SERPL-MCNC: 3.1 G/DL (ref 3.5–5)
ALBUMIN/GLOB SERPL: 0.7 {RATIO} (ref 1.1–2.2)
ALP SERPL-CCNC: 98 U/L (ref 45–117)
ALT SERPL-CCNC: 19 U/L (ref 12–78)
ANION GAP SERPL CALC-SCNC: 4 MMOL/L (ref 5–15)
AST SERPL-CCNC: 31 U/L (ref 15–37)
BASOPHILS # BLD: 0.1 K/UL (ref 0–0.1)
BASOPHILS NFR BLD: 1 % (ref 0–1)
BILIRUB SERPL-MCNC: 0.9 MG/DL (ref 0.2–1)
BLASTS NFR BLD MANUAL: 0 %
BUN SERPL-MCNC: 7 MG/DL (ref 6–20)
BUN/CREAT SERPL: 11 (ref 12–20)
CALCIUM SERPL-MCNC: 8.5 MG/DL (ref 8.5–10.1)
CHLORIDE SERPL-SCNC: 109 MMOL/L (ref 97–108)
CO2 SERPL-SCNC: 26 MMOL/L (ref 21–32)
CREAT SERPL-MCNC: 0.64 MG/DL (ref 0.55–1.02)
DIFFERENTIAL METHOD BLD: ABNORMAL
EOSINOPHIL # BLD: 0.2 K/UL (ref 0–0.4)
EOSINOPHIL NFR BLD: 4 % (ref 0–7)
ERYTHROCYTE [DISTWIDTH] IN BLOOD BY AUTOMATED COUNT: 14.8 % (ref 11.5–14.5)
GLOBULIN SER CALC-MCNC: 4.5 G/DL (ref 2–4)
GLUCOSE SERPL-MCNC: 91 MG/DL (ref 65–100)
HCT VFR BLD AUTO: 22.9 % (ref 35–47)
HGB BLD-MCNC: 8 G/DL (ref 11.5–16)
IMM GRANULOCYTES # BLD AUTO: 0 K/UL
IMM GRANULOCYTES NFR BLD AUTO: 0 %
LYMPHOCYTES # BLD: 4.3 K/UL (ref 0.8–3.5)
LYMPHOCYTES NFR BLD: 70 % (ref 12–49)
MCH RBC QN AUTO: 37.6 PG (ref 26–34)
MCHC RBC AUTO-ENTMCNC: 34.9 G/DL (ref 30–36.5)
MCV RBC AUTO: 107.5 FL (ref 80–99)
METAMYELOCYTES NFR BLD MANUAL: 0 %
MONOCYTES # BLD: 0.4 K/UL (ref 0–1)
MONOCYTES NFR BLD: 7 % (ref 5–13)
MYELOCYTES NFR BLD MANUAL: 0 %
NEUTS BAND NFR BLD MANUAL: 0 % (ref 0–6)
NEUTS SEG # BLD: 1.1 K/UL (ref 1.8–8)
NEUTS SEG NFR BLD: 18 % (ref 32–75)
NRBC # BLD: 0.37 K/UL (ref 0–0.01)
NRBC BLD-RTO: 6.1 PER 100 WBC
OTHER CELLS NFR BLD MANUAL: 0 %
PLATELET # BLD AUTO: 176 K/UL (ref 150–400)
PMV BLD AUTO: 11.3 FL (ref 8.9–12.9)
POTASSIUM SERPL-SCNC: 4.5 MMOL/L (ref 3.5–5.1)
PROMYELOCYTES NFR BLD MANUAL: 0 %
PROT SERPL-MCNC: 7.6 G/DL (ref 6.4–8.2)
RBC # BLD AUTO: 2.13 M/UL (ref 3.8–5.2)
RBC MORPH BLD: ABNORMAL
SODIUM SERPL-SCNC: 139 MMOL/L (ref 136–145)
WBC # BLD AUTO: 6.1 K/UL (ref 3.6–11)

## 2021-10-01 PROCEDURE — 36415 COLL VENOUS BLD VENIPUNCTURE: CPT

## 2021-10-01 PROCEDURE — 74011250636 HC RX REV CODE- 250/636: Performed by: INTERNAL MEDICINE

## 2021-10-01 PROCEDURE — 65270000029 HC RM PRIVATE

## 2021-10-01 PROCEDURE — 85027 COMPLETE CBC AUTOMATED: CPT

## 2021-10-01 PROCEDURE — 80053 COMPREHEN METABOLIC PANEL: CPT

## 2021-10-01 PROCEDURE — 74011250636 HC RX REV CODE- 250/636: Performed by: EMERGENCY MEDICINE

## 2021-10-01 PROCEDURE — 99232 SBSQ HOSP IP/OBS MODERATE 35: CPT | Performed by: INTERNAL MEDICINE

## 2021-10-01 PROCEDURE — 74011250637 HC RX REV CODE- 250/637: Performed by: INTERNAL MEDICINE

## 2021-10-01 PROCEDURE — 74011000258 HC RX REV CODE- 258: Performed by: INTERNAL MEDICINE

## 2021-10-01 RX ADMIN — CEFTRIAXONE 1 G: 1 INJECTION, POWDER, FOR SOLUTION INTRAMUSCULAR; INTRAVENOUS at 20:12

## 2021-10-01 RX ADMIN — DIPHENHYDRAMINE HYDROCHLORIDE 12.5 MG: 50 INJECTION, SOLUTION INTRAMUSCULAR; INTRAVENOUS at 15:54

## 2021-10-01 RX ADMIN — HYDROMORPHONE HYDROCHLORIDE 0.5 MG: 2 INJECTION INTRAMUSCULAR; INTRAVENOUS; SUBCUTANEOUS at 15:54

## 2021-10-01 RX ADMIN — HYDROMORPHONE HYDROCHLORIDE 0.5 MG: 2 INJECTION INTRAMUSCULAR; INTRAVENOUS; SUBCUTANEOUS at 06:36

## 2021-10-01 RX ADMIN — POLYETHYLENE GLYCOL 3350 17 G: 17 POWDER, FOR SOLUTION ORAL at 19:03

## 2021-10-01 RX ADMIN — HYDROXYUREA 500 MG: 500 CAPSULE ORAL at 11:28

## 2021-10-01 RX ADMIN — LOSARTAN POTASSIUM 50 MG: 50 TABLET, FILM COATED ORAL at 22:27

## 2021-10-01 RX ADMIN — Medication 10 ML: at 06:22

## 2021-10-01 RX ADMIN — Medication 10 ML: at 14:07

## 2021-10-01 RX ADMIN — FLUOXETINE 10 MG: 10 CAPSULE ORAL at 09:39

## 2021-10-01 RX ADMIN — DIPHENHYDRAMINE HYDROCHLORIDE 12.5 MG: 50 INJECTION, SOLUTION INTRAMUSCULAR; INTRAVENOUS at 06:36

## 2021-10-01 RX ADMIN — HYDROMORPHONE HYDROCHLORIDE 0.5 MG: 2 INJECTION INTRAMUSCULAR; INTRAVENOUS; SUBCUTANEOUS at 02:19

## 2021-10-01 RX ADMIN — POTASSIUM CHLORIDE, DEXTROSE MONOHYDRATE AND SODIUM CHLORIDE 100 ML/HR: 150; 5; 450 INJECTION, SOLUTION INTRAVENOUS at 15:08

## 2021-10-01 RX ADMIN — DIPHENHYDRAMINE HYDROCHLORIDE 12.5 MG: 50 INJECTION, SOLUTION INTRAMUSCULAR; INTRAVENOUS at 11:28

## 2021-10-01 RX ADMIN — ONDANSETRON 4 MG: 2 INJECTION INTRAMUSCULAR; INTRAVENOUS at 05:07

## 2021-10-01 RX ADMIN — HYDROXYUREA 500 MG: 500 CAPSULE ORAL at 19:03

## 2021-10-01 RX ADMIN — ONDANSETRON 4 MG: 2 INJECTION INTRAMUSCULAR; INTRAVENOUS at 15:51

## 2021-10-01 RX ADMIN — DIPHENHYDRAMINE HYDROCHLORIDE 12.5 MG: 50 INJECTION, SOLUTION INTRAMUSCULAR; INTRAVENOUS at 20:23

## 2021-10-01 RX ADMIN — FOLIC ACID 1 MG: 1 TABLET ORAL at 09:39

## 2021-10-01 RX ADMIN — PANTOPRAZOLE SODIUM 40 MG: 40 TABLET, DELAYED RELEASE ORAL at 06:36

## 2021-10-01 RX ADMIN — ENOXAPARIN SODIUM 40 MG: 40 INJECTION SUBCUTANEOUS at 09:39

## 2021-10-01 RX ADMIN — HYDROMORPHONE HYDROCHLORIDE 0.5 MG: 2 INJECTION INTRAMUSCULAR; INTRAVENOUS; SUBCUTANEOUS at 20:23

## 2021-10-01 RX ADMIN — DIPHENHYDRAMINE HYDROCHLORIDE 12.5 MG: 50 INJECTION, SOLUTION INTRAMUSCULAR; INTRAVENOUS at 02:20

## 2021-10-01 RX ADMIN — HYDROMORPHONE HYDROCHLORIDE 0.5 MG: 2 INJECTION INTRAMUSCULAR; INTRAVENOUS; SUBCUTANEOUS at 11:29

## 2021-10-01 NOTE — PROGRESS NOTES
General Daily Progress Note    Admit Date: 9/28/2021    Subjective:     Patient complains of pain. .     Current Facility-Administered Medications   Medication Dose Route Frequency    HYDROmorphone (DILAUDID) injection 0.5 mg  0.5 mg IntraVENous Q4H PRN    diphenhydrAMINE (BENADRYL) injection 12.5 mg  12.5 mg IntraVENous Q4H PRN    dextrose 5% - 0.45% NaCl with KCl 20 mEq/L infusion  100 mL/hr IntraVENous CONTINUOUS    acetaminophen (TYLENOL) tablet 650 mg  650 mg Oral Q6H PRN    ondansetron (ZOFRAN) injection 4 mg  4 mg IntraVENous Q4H PRN    fentaNYL (DURAGESIC) 75 mcg/hr patch 1 Patch  1 Patch TransDERmal Q72H    FLUoxetine (PROzac) capsule 10 mg  10 mg Oral DAILY    folic acid (FOLVITE) tablet 1 mg  1 mg Oral DAILY    hydroxyurea (HYDREA) chemo cap 500 mg  500 mg Oral BID    meclizine (ANTIVERT) tablet 25 mg  25 mg Oral TID PRN    pantoprazole (PROTONIX) tablet 40 mg  40 mg Oral ACB    losartan (COZAAR) tablet 50 mg  50 mg Oral QHS    sodium chloride (NS) flush 5-40 mL  5-40 mL IntraVENous Q8H    sodium chloride (NS) flush 5-40 mL  5-40 mL IntraVENous PRN    acetaminophen (TYLENOL) tablet 650 mg  650 mg Oral Q6H PRN    Or    acetaminophen (TYLENOL) suppository 650 mg  650 mg Rectal Q6H PRN    polyethylene glycol (MIRALAX) packet 17 g  17 g Oral DAILY PRN    ondansetron (ZOFRAN ODT) tablet 4 mg  4 mg Oral Q8H PRN    Or    ondansetron (ZOFRAN) injection 4 mg  4 mg IntraVENous Q6H PRN    enoxaparin (LOVENOX) injection 40 mg  40 mg SubCUTAneous DAILY    cefTRIAXone (ROCEPHIN) 1 g in 0.9% sodium chloride (MBP/ADV) 50 mL MBP  1 g IntraVENous Q24H        Review of Systems  A comprehensive review of systems was negative.     Objective:     Patient Vitals for the past 24 hrs:   BP Temp Pulse Resp SpO2   10/01/21 1120 (!) 154/96 98.5 °F (36.9 °C) 79 16 99 %   10/01/21 0816 (!) 152/90 98.4 °F (36.9 °C) 81 17 97 %   10/01/21 0320 (!) 150/89 98.6 °F (37 °C) 80 18 98 %   09/30/21 2142 136/83 98.5 °F (36.9 °C) 85 17 95 %     10/01 0701 - 10/01 1900  In: 240 [P.O.:240]  Out: -   09/29 1901 - 10/01 0700  In: 3878 [P.O.:500; I.V.:1250]  Out: -     Physical Exam:   Visit Vitals  BP (!) 154/96 (BP 1 Location: Left upper arm, BP Patient Position: At rest)   Pulse 79   Temp 98.5 °F (36.9 °C)   Resp 16   Ht 5' 11\" (1.803 m)   Wt 201 lb 4.5 oz (91.3 kg)   SpO2 99%   Breastfeeding No   BMI 28.07 kg/m²     General appearance: alert, cooperative, no distress, appears stated age  Neck: supple, symmetrical, trachea midline, no adenopathy, thyroid: not enlarged, symmetric, no tenderness/mass/nodules, no carotid bruit and no JVD  Lungs: clear to auscultation bilaterally  Heart: regular rate and rhythm, S1, S2 normal, no murmur, click, rub or gallop  Abdomen: soft, non-tender. Bowel sounds normal. No masses,  no organomegaly  Extremities: extremities normal, atraumatic, no cyanosis or edema    Assessment:     Active Problems:    [facility])        Plan:   1. Continue treatment of painful crises. Gradual but progressive improvement. Continue parenteral analgesic.

## 2021-10-01 NOTE — PROGRESS NOTES
Patient AOX3. Reported sickle cell pain and take dilaudid with benadryl. Given Miralax today for patient reported not having BM for past two days. Pain management addressed.

## 2021-10-01 NOTE — PROGRESS NOTES
Bedside shift change report given to Miracle (oncoming nurse) by Angel (offgoing nurse). Report included the following information SBAR, Kardex, Intake/Output, MAR and Recent Results.

## 2021-10-02 LAB
ALBUMIN SERPL-MCNC: 2.9 G/DL (ref 3.5–5)
ALBUMIN/GLOB SERPL: 0.6 {RATIO} (ref 1.1–2.2)
ALP SERPL-CCNC: 90 U/L (ref 45–117)
ALT SERPL-CCNC: 22 U/L (ref 12–78)
ANION GAP SERPL CALC-SCNC: 4 MMOL/L (ref 5–15)
AST SERPL-CCNC: 35 U/L (ref 15–37)
BILIRUB SERPL-MCNC: 0.8 MG/DL (ref 0.2–1)
BUN SERPL-MCNC: 7 MG/DL (ref 6–20)
BUN/CREAT SERPL: 9 (ref 12–20)
CALCIUM SERPL-MCNC: 8.7 MG/DL (ref 8.5–10.1)
CHLORIDE SERPL-SCNC: 106 MMOL/L (ref 97–108)
CO2 SERPL-SCNC: 28 MMOL/L (ref 21–32)
CREAT SERPL-MCNC: 0.79 MG/DL (ref 0.55–1.02)
GLOBULIN SER CALC-MCNC: 4.7 G/DL (ref 2–4)
GLUCOSE SERPL-MCNC: 106 MG/DL (ref 65–100)
POTASSIUM SERPL-SCNC: 3.9 MMOL/L (ref 3.5–5.1)
PROT SERPL-MCNC: 7.6 G/DL (ref 6.4–8.2)
SODIUM SERPL-SCNC: 138 MMOL/L (ref 136–145)

## 2021-10-02 PROCEDURE — 99232 SBSQ HOSP IP/OBS MODERATE 35: CPT | Performed by: INTERNAL MEDICINE

## 2021-10-02 PROCEDURE — 74011250636 HC RX REV CODE- 250/636: Performed by: INTERNAL MEDICINE

## 2021-10-02 PROCEDURE — 80053 COMPREHEN METABOLIC PANEL: CPT

## 2021-10-02 PROCEDURE — 65270000029 HC RM PRIVATE

## 2021-10-02 PROCEDURE — 74011250636 HC RX REV CODE- 250/636: Performed by: EMERGENCY MEDICINE

## 2021-10-02 PROCEDURE — 74011250637 HC RX REV CODE- 250/637: Performed by: INTERNAL MEDICINE

## 2021-10-02 PROCEDURE — 36415 COLL VENOUS BLD VENIPUNCTURE: CPT

## 2021-10-02 RX ADMIN — HYDROMORPHONE HYDROCHLORIDE 0.5 MG: 2 INJECTION INTRAMUSCULAR; INTRAVENOUS; SUBCUTANEOUS at 19:01

## 2021-10-02 RX ADMIN — ENOXAPARIN SODIUM 40 MG: 40 INJECTION SUBCUTANEOUS at 10:12

## 2021-10-02 RX ADMIN — DIPHENHYDRAMINE HYDROCHLORIDE 12.5 MG: 50 INJECTION, SOLUTION INTRAMUSCULAR; INTRAVENOUS at 09:46

## 2021-10-02 RX ADMIN — POTASSIUM CHLORIDE, DEXTROSE MONOHYDRATE AND SODIUM CHLORIDE 100 ML/HR: 150; 5; 450 INJECTION, SOLUTION INTRAVENOUS at 01:56

## 2021-10-02 RX ADMIN — DIPHENHYDRAMINE HYDROCHLORIDE 12.5 MG: 50 INJECTION, SOLUTION INTRAMUSCULAR; INTRAVENOUS at 23:12

## 2021-10-02 RX ADMIN — ONDANSETRON 4 MG: 2 INJECTION INTRAMUSCULAR; INTRAVENOUS at 23:12

## 2021-10-02 RX ADMIN — LOSARTAN POTASSIUM 50 MG: 50 TABLET, FILM COATED ORAL at 22:42

## 2021-10-02 RX ADMIN — HYDROXYUREA 500 MG: 500 CAPSULE ORAL at 10:12

## 2021-10-02 RX ADMIN — ONDANSETRON 4 MG: 2 INJECTION INTRAMUSCULAR; INTRAVENOUS at 00:44

## 2021-10-02 RX ADMIN — HYDROMORPHONE HYDROCHLORIDE 0.5 MG: 2 INJECTION INTRAMUSCULAR; INTRAVENOUS; SUBCUTANEOUS at 14:51

## 2021-10-02 RX ADMIN — DIPHENHYDRAMINE HYDROCHLORIDE 12.5 MG: 50 INJECTION, SOLUTION INTRAMUSCULAR; INTRAVENOUS at 00:44

## 2021-10-02 RX ADMIN — HYDROMORPHONE HYDROCHLORIDE 0.5 MG: 2 INJECTION INTRAMUSCULAR; INTRAVENOUS; SUBCUTANEOUS at 09:46

## 2021-10-02 RX ADMIN — FLUOXETINE 10 MG: 10 CAPSULE ORAL at 10:12

## 2021-10-02 RX ADMIN — ONDANSETRON 4 MG: 2 INJECTION INTRAMUSCULAR; INTRAVENOUS at 14:45

## 2021-10-02 RX ADMIN — Medication 10 ML: at 19:01

## 2021-10-02 RX ADMIN — HYDROMORPHONE HYDROCHLORIDE 0.5 MG: 2 INJECTION INTRAMUSCULAR; INTRAVENOUS; SUBCUTANEOUS at 23:12

## 2021-10-02 RX ADMIN — DIPHENHYDRAMINE HYDROCHLORIDE 12.5 MG: 50 INJECTION, SOLUTION INTRAMUSCULAR; INTRAVENOUS at 19:05

## 2021-10-02 RX ADMIN — DIPHENHYDRAMINE HYDROCHLORIDE 12.5 MG: 50 INJECTION, SOLUTION INTRAMUSCULAR; INTRAVENOUS at 04:48

## 2021-10-02 RX ADMIN — HYDROXYUREA 500 MG: 500 CAPSULE ORAL at 19:20

## 2021-10-02 RX ADMIN — PANTOPRAZOLE SODIUM 40 MG: 40 TABLET, DELAYED RELEASE ORAL at 10:12

## 2021-10-02 RX ADMIN — ONDANSETRON 4 MG: 2 INJECTION INTRAMUSCULAR; INTRAVENOUS at 09:47

## 2021-10-02 RX ADMIN — Medication 10 ML: at 22:42

## 2021-10-02 RX ADMIN — HYDROMORPHONE HYDROCHLORIDE 0.5 MG: 2 INJECTION INTRAMUSCULAR; INTRAVENOUS; SUBCUTANEOUS at 04:49

## 2021-10-02 RX ADMIN — FOLIC ACID 1 MG: 1 TABLET ORAL at 10:12

## 2021-10-02 RX ADMIN — DIPHENHYDRAMINE HYDROCHLORIDE 12.5 MG: 50 INJECTION, SOLUTION INTRAMUSCULAR; INTRAVENOUS at 14:45

## 2021-10-02 RX ADMIN — Medication 10 ML: at 05:05

## 2021-10-02 RX ADMIN — ONDANSETRON 4 MG: 2 INJECTION INTRAMUSCULAR; INTRAVENOUS at 19:05

## 2021-10-02 RX ADMIN — HYDROMORPHONE HYDROCHLORIDE 0.5 MG: 2 INJECTION INTRAMUSCULAR; INTRAVENOUS; SUBCUTANEOUS at 00:43

## 2021-10-02 NOTE — PROGRESS NOTES
General Daily Progress Note    Admit Date: 9/28/2021    Subjective:     Patient complains of continued pain but improving. .     Current Facility-Administered Medications   Medication Dose Route Frequency    HYDROmorphone (DILAUDID) injection 0.5 mg  0.5 mg IntraVENous Q4H PRN    diphenhydrAMINE (BENADRYL) injection 12.5 mg  12.5 mg IntraVENous Q4H PRN    dextrose 5% - 0.45% NaCl with KCl 20 mEq/L infusion  75 mL/hr IntraVENous CONTINUOUS    acetaminophen (TYLENOL) tablet 650 mg  650 mg Oral Q6H PRN    ondansetron (ZOFRAN) injection 4 mg  4 mg IntraVENous Q4H PRN    fentaNYL (DURAGESIC) 75 mcg/hr patch 1 Patch  1 Patch TransDERmal Q72H    FLUoxetine (PROzac) capsule 10 mg  10 mg Oral DAILY    folic acid (FOLVITE) tablet 1 mg  1 mg Oral DAILY    hydroxyurea (HYDREA) chemo cap 500 mg  500 mg Oral BID    meclizine (ANTIVERT) tablet 25 mg  25 mg Oral TID PRN    pantoprazole (PROTONIX) tablet 40 mg  40 mg Oral ACB    losartan (COZAAR) tablet 50 mg  50 mg Oral QHS    sodium chloride (NS) flush 5-40 mL  5-40 mL IntraVENous Q8H    sodium chloride (NS) flush 5-40 mL  5-40 mL IntraVENous PRN    acetaminophen (TYLENOL) tablet 650 mg  650 mg Oral Q6H PRN    Or    acetaminophen (TYLENOL) suppository 650 mg  650 mg Rectal Q6H PRN    polyethylene glycol (MIRALAX) packet 17 g  17 g Oral DAILY PRN    ondansetron (ZOFRAN ODT) tablet 4 mg  4 mg Oral Q8H PRN    Or    ondansetron (ZOFRAN) injection 4 mg  4 mg IntraVENous Q6H PRN    enoxaparin (LOVENOX) injection 40 mg  40 mg SubCUTAneous DAILY        Review of Systems  A comprehensive review of systems was negative. Objective:     Patient Vitals for the past 24 hrs:   BP Temp Pulse Resp SpO2   10/02/21 0800 (!) 164/110 98.6 °F (37 °C) 79 18 97 %   10/02/21 0025  99 °F (37.2 °C) 84  97 %   10/01/21 1619 (!) 150/90 99 °F (37.2 °C) 84 17 99 %     No intake/output data recorded. 09/30 1901 - 10/02 0700  In: 1990 [P.O.:740;  I.V.:1250]  Out: -     Physical Exam: Visit Vitals  BP (!) 164/110 (BP 1 Location: Left upper arm)   Pulse 79   Temp 98.6 °F (37 °C)   Resp 18   Ht 5' 11\" (1.803 m)   Wt 201 lb 4.5 oz (91.3 kg)   SpO2 97%   Breastfeeding No   BMI 28.07 kg/m²     General appearance: alert, cooperative, no distress, appears stated age  Neck: supple, symmetrical, trachea midline, no adenopathy, thyroid: not enlarged, symmetric, no tenderness/mass/nodules, no carotid bruit and no JVD  Lungs: clear to auscultation bilaterally  Heart: regular rate and rhythm, S1, S2 normal, no murmur, click, rub or gallop  Abdomen: soft, non-tender. Bowel sounds normal. No masses,  no organomegaly  Extremities: extremities normal, atraumatic, no cyanosis or edema    Assessment:     Active Problems:    Sickle cell crisis ([facility] (5/22/2017)        Plan:     1. Continue treatment of painful crises. 2.  Antibiotic discontinued. Not sure of reason medication was started. There is nothing to suggest an infection currently. 3.  Continue hydration.

## 2021-10-02 NOTE — PROGRESS NOTES
End of Shift Note    Bedside shift change report given to [facility] (oncoming nurse) by Cassidy Gongora (offgoing nurse). Report included the following information SBAR, Kardex, Intake/Output, MAR and Recent Results    Shift worked:  [id number]     Shift summary and any significant changes:     No significant changes. Pt requesting pain meds/benadryl q4 hours.       Concerns for physician to address:  none     Zone phone for oncoming shift:              Cassidy Gongora

## 2021-10-03 PROCEDURE — 65270000029 HC RM PRIVATE

## 2021-10-03 PROCEDURE — 99232 SBSQ HOSP IP/OBS MODERATE 35: CPT | Performed by: INTERNAL MEDICINE

## 2021-10-03 PROCEDURE — 74011250637 HC RX REV CODE- 250/637: Performed by: INTERNAL MEDICINE

## 2021-10-03 PROCEDURE — 74011250636 HC RX REV CODE- 250/636: Performed by: EMERGENCY MEDICINE

## 2021-10-03 PROCEDURE — 74011250636 HC RX REV CODE- 250/636: Performed by: INTERNAL MEDICINE

## 2021-10-03 RX ORDER — HYDROCODONE BITARTRATE AND ACETAMINOPHEN 10; 325 MG/1; MG/1
1 TABLET ORAL
Status: DISCONTINUED | OUTPATIENT
Start: 2021-10-03 | End: 2021-10-05

## 2021-10-03 RX ORDER — HYDROMORPHONE HYDROCHLORIDE 2 MG/ML
0.5 INJECTION, SOLUTION INTRAMUSCULAR; INTRAVENOUS; SUBCUTANEOUS
Status: DISCONTINUED | OUTPATIENT
Start: 2021-10-03 | End: 2021-10-04

## 2021-10-03 RX ORDER — LOSARTAN POTASSIUM 100 MG/1
100 TABLET ORAL DAILY
Status: DISCONTINUED | OUTPATIENT
Start: 2021-10-04 | End: 2021-10-06 | Stop reason: HOSPADM

## 2021-10-03 RX ADMIN — ONDANSETRON HYDROCHLORIDE 4 MG: 2 INJECTION, SOLUTION INTRAMUSCULAR; INTRAVENOUS at 18:35

## 2021-10-03 RX ADMIN — PANTOPRAZOLE SODIUM 40 MG: 40 TABLET, DELAYED RELEASE ORAL at 07:46

## 2021-10-03 RX ADMIN — POTASSIUM CHLORIDE, DEXTROSE MONOHYDRATE AND SODIUM CHLORIDE 75 ML/HR: 150; 5; 450 INJECTION, SOLUTION INTRAVENOUS at 17:48

## 2021-10-03 RX ADMIN — FOLIC ACID 1 MG: 1 TABLET ORAL at 10:25

## 2021-10-03 RX ADMIN — HYDROMORPHONE HYDROCHLORIDE 0.5 MG: 2 INJECTION INTRAMUSCULAR; INTRAVENOUS; SUBCUTANEOUS at 03:10

## 2021-10-03 RX ADMIN — DIPHENHYDRAMINE HYDROCHLORIDE 12.5 MG: 50 INJECTION, SOLUTION INTRAMUSCULAR; INTRAVENOUS at 07:46

## 2021-10-03 RX ADMIN — DIPHENHYDRAMINE HYDROCHLORIDE 12.5 MG: 50 INJECTION, SOLUTION INTRAMUSCULAR; INTRAVENOUS at 03:11

## 2021-10-03 RX ADMIN — Medication 10 ML: at 06:00

## 2021-10-03 RX ADMIN — HYDROCODONE BITARTRATE AND ACETAMINOPHEN 1 TABLET: 10; 325 TABLET ORAL at 15:38

## 2021-10-03 RX ADMIN — ENOXAPARIN SODIUM 40 MG: 40 INJECTION SUBCUTANEOUS at 10:25

## 2021-10-03 RX ADMIN — HYDROXYUREA 500 MG: 500 CAPSULE ORAL at 18:34

## 2021-10-03 RX ADMIN — HYDROMORPHONE HYDROCHLORIDE 0.5 MG: 2 INJECTION, SOLUTION INTRAMUSCULAR; INTRAVENOUS; SUBCUTANEOUS at 12:37

## 2021-10-03 RX ADMIN — Medication 10 ML: at 17:49

## 2021-10-03 RX ADMIN — ONDANSETRON 4 MG: 2 INJECTION INTRAMUSCULAR; INTRAVENOUS at 03:11

## 2021-10-03 RX ADMIN — ONDANSETRON 4 MG: 2 INJECTION INTRAMUSCULAR; INTRAVENOUS at 07:46

## 2021-10-03 RX ADMIN — POTASSIUM CHLORIDE, DEXTROSE MONOHYDRATE AND SODIUM CHLORIDE 75 ML/HR: 150; 5; 450 INJECTION, SOLUTION INTRAVENOUS at 04:28

## 2021-10-03 RX ADMIN — HYDROMORPHONE HYDROCHLORIDE 0.5 MG: 2 INJECTION INTRAMUSCULAR; INTRAVENOUS; SUBCUTANEOUS at 07:46

## 2021-10-03 RX ADMIN — FLUOXETINE 10 MG: 10 CAPSULE ORAL at 10:25

## 2021-10-03 RX ADMIN — DIPHENHYDRAMINE HYDROCHLORIDE 12.5 MG: 50 INJECTION, SOLUTION INTRAMUSCULAR; INTRAVENOUS at 18:36

## 2021-10-03 RX ADMIN — ONDANSETRON 4 MG: 2 INJECTION INTRAMUSCULAR; INTRAVENOUS at 12:38

## 2021-10-03 RX ADMIN — DIPHENHYDRAMINE HYDROCHLORIDE 12.5 MG: 50 INJECTION, SOLUTION INTRAMUSCULAR; INTRAVENOUS at 12:38

## 2021-10-03 RX ADMIN — HYDROMORPHONE HYDROCHLORIDE 0.5 MG: 2 INJECTION, SOLUTION INTRAMUSCULAR; INTRAVENOUS; SUBCUTANEOUS at 18:35

## 2021-10-03 RX ADMIN — HYDROXYUREA 500 MG: 500 CAPSULE ORAL at 10:30

## 2021-10-03 NOTE — PROGRESS NOTES
General Daily Progress Note    Admit Date: 9/28/2021    Subjective:     Patient complains of pain but improving. .     Current Facility-Administered Medications   Medication Dose Route Frequency    [START ON 10/4/2021] losartan (COZAAR) tablet 100 mg  100 mg Oral DAILY    HYDROmorphone (DILAUDID) injection 0.5 mg  0.5 mg IntraVENous Q6H PRN    HYDROcodone-acetaminophen (NORCO)  mg tablet 1 Tablet  1 Tablet Oral Q6H PRN    diphenhydrAMINE (BENADRYL) injection 12.5 mg  12.5 mg IntraVENous Q4H PRN    dextrose 5% - 0.45% NaCl with KCl 20 mEq/L infusion  75 mL/hr IntraVENous CONTINUOUS    ondansetron (ZOFRAN) injection 4 mg  4 mg IntraVENous Q4H PRN    fentaNYL (DURAGESIC) 75 mcg/hr patch 1 Patch  1 Patch TransDERmal Q72H    FLUoxetine (PROzac) capsule 10 mg  10 mg Oral DAILY    folic acid (FOLVITE) tablet 1 mg  1 mg Oral DAILY    hydroxyurea (HYDREA) chemo cap 500 mg  500 mg Oral BID    meclizine (ANTIVERT) tablet 25 mg  25 mg Oral TID PRN    pantoprazole (PROTONIX) tablet 40 mg  40 mg Oral ACB    sodium chloride (NS) flush 5-40 mL  5-40 mL IntraVENous Q8H    sodium chloride (NS) flush 5-40 mL  5-40 mL IntraVENous PRN    polyethylene glycol (MIRALAX) packet 17 g  17 g Oral DAILY PRN    ondansetron (ZOFRAN ODT) tablet 4 mg  4 mg Oral Q8H PRN    Or    ondansetron (ZOFRAN) injection 4 mg  4 mg IntraVENous Q6H PRN    enoxaparin (LOVENOX) injection 40 mg  40 mg SubCUTAneous DAILY        Review of Systems  A comprehensive review of systems was negative. Objective:     Patient Vitals for the past 24 hrs:   BP Temp Pulse Resp SpO2   10/03/21 0929 133/68 98.5 °F (36.9 °C) 85 18 100 %   10/02/21 2315 (!) 160/92 99.2 °F (37.3 °C) 93 17 96 %     No intake/output data recorded. No intake/output data recorded.     Physical Exam:   Visit Vitals  /68 (BP 1 Location: Right upper arm, BP Patient Position: At rest)   Pulse 85   Temp 98.5 °F (36.9 °C)   Resp 18   Ht 5' 11\" (1.803 m)   Wt 201 lb 4.5 oz (91.3 kg)   SpO2 100%   Breastfeeding No   BMI 28.07 kg/m²     General appearance: alert, cooperative, no distress, appears stated age  Neck: supple, symmetrical, trachea midline, no adenopathy, thyroid: not enlarged, symmetric, no tenderness/mass/nodules, no carotid bruit and no JVD  Lungs: clear to auscultation bilaterally  Heart: regular rate and rhythm, S1, S2 normal, no murmur, click, rub or gallop  Abdomen: soft, non-tender. Bowel sounds normal. No masses,  no organomegaly  Extremities: extremities normal, atraumatic, no cyanosis or edema    Assessment:     Active Problems:    Sickle cell crisis ([facility] [address] (5/22/2017)        Plan:     1. Continue treatment of painful crises.

## 2021-10-03 NOTE — PROGRESS NOTES
End of Shift Note    Bedside shift change report given to Hipolito Garcia (oncoming nurse) by Michelle Caban (offgoing nurse). Report included the following information SBAR, Kardex, Intake/Output, MAR and Recent Results    Shift worked:  2111-2124     Shift summary and any significant changes:     No significant changes. Pt still requesting medications [id number]. Unable to obtain AM lab specimen. No other concerns.      Concerns for physician to address:  none     Zone phone for oncoming shift:              Michelle Caban

## 2021-10-03 NOTE — PROGRESS NOTES
End of Shift Note    Bedside shift change report given to Bing Catalan (oncoming nurse) by Janette Brewster RN (offgoing nurse).   Report included the following information SBAR, Kardex, Intake/Output, MAR and Recent Results    Shift worked:  7a-7p     Shift summary and any significant changes:     medicated for pain     Concerns for physician to address:  none     Zone phone for oncoming shift:   [phone]           Janetet Brewster RN

## 2021-10-03 NOTE — PROGRESS NOTES
End of Shift Note    Bedside shift change report given to Yaneth Guillaume (oncoming nurse) by Master Nath RN (offgoing nurse). Report included the following information SBAR, Kardex, Intake/Output and MAR    Shift worked:  7a-7p     Shift summary and any significant changes:    No significant changes. Pain management throughout shift.  New IV placed by PICC team due to infiltration     Concerns for physician to address:  n/a     Zone phone for oncoming shift:   [address], RN

## 2021-10-04 LAB
ANION GAP SERPL CALC-SCNC: ABNORMAL MMOL/L (ref 5–15)
BACTERIA SPEC CULT: NORMAL
BACTERIA SPEC CULT: NORMAL
BASOPHILS # BLD: 0.2 K/UL (ref 0–0.1)
BASOPHILS NFR BLD: 3 % (ref 0–1)
BLASTS NFR BLD MANUAL: 0 %
BUN SERPL-MCNC: 9 MG/DL (ref 6–20)
BUN/CREAT SERPL: 12 (ref 12–20)
CALCIUM SERPL-MCNC: 8.6 MG/DL (ref 8.5–10.1)
CHLORIDE SERPL-SCNC: 105 MMOL/L (ref 97–108)
CO2 SERPL-SCNC: 32 MMOL/L (ref 21–32)
CREAT SERPL-MCNC: 0.77 MG/DL (ref 0.55–1.02)
DIFFERENTIAL METHOD BLD: ABNORMAL
EOSINOPHIL # BLD: 0.2 K/UL (ref 0–0.4)
EOSINOPHIL NFR BLD: 3 % (ref 0–7)
ERYTHROCYTE [DISTWIDTH] IN BLOOD BY AUTOMATED COUNT: 14.7 % (ref 11.5–14.5)
GLUCOSE SERPL-MCNC: 179 MG/DL (ref 65–100)
HCT VFR BLD AUTO: 23.8 % (ref 35–47)
HGB BLD-MCNC: 8.4 G/DL (ref 11.5–16)
IMM GRANULOCYTES # BLD AUTO: 0 K/UL
IMM GRANULOCYTES NFR BLD AUTO: 0 %
LYMPHOCYTES # BLD: 3 K/UL (ref 0.8–3.5)
LYMPHOCYTES NFR BLD: 48 % (ref 12–49)
MCH RBC QN AUTO: 38.5 PG (ref 26–34)
MCHC RBC AUTO-ENTMCNC: 35.3 G/DL (ref 30–36.5)
MCV RBC AUTO: 109.2 FL (ref 80–99)
METAMYELOCYTES NFR BLD MANUAL: 0 %
MONOCYTES # BLD: 0.8 K/UL (ref 0–1)
MONOCYTES NFR BLD: 13 % (ref 5–13)
MYELOCYTES NFR BLD MANUAL: 1 %
NEUTS BAND NFR BLD MANUAL: 1 % (ref 0–6)
NEUTS SEG # BLD: 1.7 K/UL (ref 1.8–8)
NEUTS SEG NFR BLD: 27 % (ref 32–75)
NRBC # BLD: 0.2 K/UL (ref 0–0.01)
NRBC BLD-RTO: 3.2 PER 100 WBC
OTHER CELLS NFR BLD MANUAL: 3 %
PLATELET # BLD AUTO: 216 K/UL (ref 150–400)
PMV BLD AUTO: 11.1 FL (ref 8.9–12.9)
POTASSIUM SERPL-SCNC: 4.6 MMOL/L (ref 3.5–5.1)
PROMYELOCYTES NFR BLD MANUAL: 1 %
RBC # BLD AUTO: 2.18 M/UL (ref 3.8–5.2)
RBC MORPH BLD: ABNORMAL
SERVICE CMNT-IMP: NORMAL
SERVICE CMNT-IMP: NORMAL
SODIUM SERPL-SCNC: 135 MMOL/L (ref 136–145)
WBC # BLD AUTO: 6.2 K/UL (ref 3.6–11)
WBC MORPH BLD: ABNORMAL

## 2021-10-04 PROCEDURE — 74011250636 HC RX REV CODE- 250/636: Performed by: INTERNAL MEDICINE

## 2021-10-04 PROCEDURE — 80048 BASIC METABOLIC PNL TOTAL CA: CPT

## 2021-10-04 PROCEDURE — 74011250637 HC RX REV CODE- 250/637: Performed by: INTERNAL MEDICINE

## 2021-10-04 PROCEDURE — 65270000029 HC RM PRIVATE

## 2021-10-04 PROCEDURE — 99232 SBSQ HOSP IP/OBS MODERATE 35: CPT | Performed by: INTERNAL MEDICINE

## 2021-10-04 PROCEDURE — 36415 COLL VENOUS BLD VENIPUNCTURE: CPT

## 2021-10-04 PROCEDURE — 85027 COMPLETE CBC AUTOMATED: CPT

## 2021-10-04 RX ORDER — HYDROMORPHONE HYDROCHLORIDE 2 MG/ML
0.5 INJECTION, SOLUTION INTRAMUSCULAR; INTRAVENOUS; SUBCUTANEOUS
Status: DISCONTINUED | OUTPATIENT
Start: 2021-10-04 | End: 2021-10-05

## 2021-10-04 RX ADMIN — FOLIC ACID 1 MG: 1 TABLET ORAL at 09:43

## 2021-10-04 RX ADMIN — DIPHENHYDRAMINE HYDROCHLORIDE 12.5 MG: 50 INJECTION, SOLUTION INTRAMUSCULAR; INTRAVENOUS at 07:21

## 2021-10-04 RX ADMIN — DIPHENHYDRAMINE HYDROCHLORIDE 12.5 MG: 50 INJECTION, SOLUTION INTRAMUSCULAR; INTRAVENOUS at 00:55

## 2021-10-04 RX ADMIN — HYDROCODONE BITARTRATE AND ACETAMINOPHEN 1 TABLET: 10; 325 TABLET ORAL at 13:26

## 2021-10-04 RX ADMIN — HYDROXYUREA 500 MG: 500 CAPSULE ORAL at 12:08

## 2021-10-04 RX ADMIN — LOSARTAN POTASSIUM 100 MG: 100 TABLET, FILM COATED ORAL at 09:43

## 2021-10-04 RX ADMIN — FLUOXETINE 10 MG: 10 CAPSULE ORAL at 09:43

## 2021-10-04 RX ADMIN — HYDROXYUREA 500 MG: 500 CAPSULE ORAL at 18:32

## 2021-10-04 RX ADMIN — ENOXAPARIN SODIUM 40 MG: 40 INJECTION SUBCUTANEOUS at 09:43

## 2021-10-04 RX ADMIN — Medication 10 ML: at 13:27

## 2021-10-04 RX ADMIN — HYDROMORPHONE HYDROCHLORIDE 0.5 MG: 2 INJECTION, SOLUTION INTRAMUSCULAR; INTRAVENOUS; SUBCUTANEOUS at 00:55

## 2021-10-04 RX ADMIN — HYDROMORPHONE HYDROCHLORIDE 0.5 MG: 2 INJECTION, SOLUTION INTRAMUSCULAR; INTRAVENOUS; SUBCUTANEOUS at 07:20

## 2021-10-04 RX ADMIN — HYDROMORPHONE HYDROCHLORIDE 0.5 MG: 2 INJECTION, SOLUTION INTRAMUSCULAR; INTRAVENOUS; SUBCUTANEOUS at 15:59

## 2021-10-04 RX ADMIN — PANTOPRAZOLE SODIUM 40 MG: 40 TABLET, DELAYED RELEASE ORAL at 09:43

## 2021-10-04 RX ADMIN — Medication 10 ML: at 22:20

## 2021-10-04 RX ADMIN — POTASSIUM CHLORIDE, DEXTROSE MONOHYDRATE AND SODIUM CHLORIDE 75 ML/HR: 150; 5; 450 INJECTION, SOLUTION INTRAVENOUS at 09:01

## 2021-10-04 RX ADMIN — ONDANSETRON HYDROCHLORIDE 4 MG: 2 INJECTION, SOLUTION INTRAMUSCULAR; INTRAVENOUS at 00:55

## 2021-10-04 RX ADMIN — ONDANSETRON HYDROCHLORIDE 4 MG: 2 INJECTION, SOLUTION INTRAMUSCULAR; INTRAVENOUS at 07:20

## 2021-10-04 RX ADMIN — Medication 10 ML: at 06:00

## 2021-10-04 NOTE — PROGRESS NOTES
Spiritual Care Partner Volunteer visited patient in Med Telemetry on 10.4. 21. Documented by Carter Terarzas, Staff , on 10. 4.21  Request  Support/Spiritual Care Services via [facility]

## 2021-10-04 NOTE — PROGRESS NOTES
Spiritual Care Assessment/Progress Note  [facility]      NAME: Ciera Chatterjee      MRN: [id number]  AGE: 63 y.o.  SEX: female  Sabianism Affiliation: Non Anabaptism   Language: English     10/4/2021     Total Time (in minutes): 37     Spiritual Assessment begun in [facility] 2 MED TELE through conversation with:         [x]Patient        [] Family    [] Friend(s)        Reason for Consult: Initial/Spiritual assessment, patient floor     Spiritual beliefs: (Please include comment if needed)     [x] Identifies with a zay tradition:         [] Supported by a zay community:            [] Claims no spiritual orientation:           [] Seeking spiritual identity:                [] Adheres to an individual form of spirituality:           [] Not able to assess:                           Identified resources for coping:      [x] Prayer                               [] Music                  [] Guided Imagery     [x] Family/friends                 [] Pet visits     [] Devotional reading                         [] Unknown     [] Other:                                              Interventions offered during this visit: (See comments for more details)    Patient Interventions: Affirmation of emotions/emotional suffering, Affirmation of zay, Catharsis/review of pertinent events in supportive environment, Iconic (affirming the presence of God/Higher Power), Initial/Spiritual assessment, patient floor, Normalization of emotional/spiritual concerns, Prayer (assurance of), Sabianism beliefs/image of God discussed           Plan of Care:     [] Support spiritual and/or cultural needs    [] Support AMD and/or advance care planning process      [] Support grieving process   [] Coordinate Rites and/or Rituals    [] Coordination with community clergy   [x] No spiritual needs identified at this time   [] Detailed Plan of Care below (See Comments)  [] Make referral to Music Therapy  [] Make referral to Pet Therapy [] Make referral to Addiction services  [] Make referral to [facility]  [] Make referral to Spiritual Care Partner  [] No future visits requested        [x] Follow up upon further referrals     Comments:  Reviewed chart prior to visit on Med Tele for spiritual assessment. No family/friends present. Patient was welcoming of visit. Provided bedside presence and supportive listening as Candidajake Walsh engaged in life review sharing about her medical challenges and the gift of supportive parents and family. She has a son who lives in [place] and a daughter who lives in [place], [place]. Her mother is still living; her brother lives with mother. Eliza is an important aspect of Brinda's life. She shared how it has been a source of strength and coping most of her life. She expressed appreciation for visit. She articulated no spiritual needs or concerns at this time. She was receptive to assurance of prayer.      ROGE Stephens, [facility], Staff [address]    [address] Paging Service  [phone]

## 2021-10-04 NOTE — PROGRESS NOTES
End of Shift Note    Bedside shift change report given to Aleja (oncoming nurse) by Jose Dillon (offgoing nurse). Report included the following information SBAR, Kardex, Intake/Output, MAR and Recent Results    Shift worked:  9662-8965     Shift summary and any significant changes:     No significant changes, pt requesting IV dilaudid as often as available.      Concerns for physician to address:  none     Zone phone for oncoming shift:            Jose Dillon

## 2021-10-05 PROCEDURE — 65270000029 HC RM PRIVATE

## 2021-10-05 PROCEDURE — 99232 SBSQ HOSP IP/OBS MODERATE 35: CPT | Performed by: INTERNAL MEDICINE

## 2021-10-05 PROCEDURE — 74011250637 HC RX REV CODE- 250/637: Performed by: INTERNAL MEDICINE

## 2021-10-05 PROCEDURE — 74011250636 HC RX REV CODE- 250/636: Performed by: INTERNAL MEDICINE

## 2021-10-05 RX ORDER — HYDROCODONE BITARTRATE AND ACETAMINOPHEN 10; 325 MG/1; MG/1
1 TABLET ORAL
Status: DISCONTINUED | OUTPATIENT
Start: 2021-10-05 | End: 2021-10-06 | Stop reason: HOSPADM

## 2021-10-05 RX ADMIN — FLUOXETINE 10 MG: 10 CAPSULE ORAL at 09:41

## 2021-10-05 RX ADMIN — LOSARTAN POTASSIUM 100 MG: 100 TABLET, FILM COATED ORAL at 09:41

## 2021-10-05 RX ADMIN — ONDANSETRON HYDROCHLORIDE 4 MG: 2 INJECTION, SOLUTION INTRAMUSCULAR; INTRAVENOUS at 22:23

## 2021-10-05 RX ADMIN — HYDROCODONE BITARTRATE AND ACETAMINOPHEN 1 TABLET: 10; 325 TABLET ORAL at 22:21

## 2021-10-05 RX ADMIN — ONDANSETRON 4 MG: 4 TABLET, ORALLY DISINTEGRATING ORAL at 00:33

## 2021-10-05 RX ADMIN — ENOXAPARIN SODIUM 40 MG: 40 INJECTION SUBCUTANEOUS at 09:41

## 2021-10-05 RX ADMIN — FOLIC ACID 1 MG: 1 TABLET ORAL at 09:41

## 2021-10-05 RX ADMIN — HYDROCODONE BITARTRATE AND ACETAMINOPHEN 1 TABLET: 10; 325 TABLET ORAL at 00:33

## 2021-10-05 RX ADMIN — PANTOPRAZOLE SODIUM 40 MG: 40 TABLET, DELAYED RELEASE ORAL at 07:04

## 2021-10-05 RX ADMIN — DIPHENHYDRAMINE HYDROCHLORIDE 12.5 MG: 50 INJECTION, SOLUTION INTRAMUSCULAR; INTRAVENOUS at 22:22

## 2021-10-05 RX ADMIN — ONDANSETRON HYDROCHLORIDE 4 MG: 2 INJECTION, SOLUTION INTRAMUSCULAR; INTRAVENOUS at 16:16

## 2021-10-05 RX ADMIN — DIPHENHYDRAMINE HYDROCHLORIDE 12.5 MG: 50 INJECTION, SOLUTION INTRAMUSCULAR; INTRAVENOUS at 09:43

## 2021-10-05 RX ADMIN — Medication 10 ML: at 22:00

## 2021-10-05 RX ADMIN — POTASSIUM CHLORIDE, DEXTROSE MONOHYDRATE AND SODIUM CHLORIDE 50 ML/HR: 150; 5; 450 INJECTION, SOLUTION INTRAVENOUS at 03:52

## 2021-10-05 RX ADMIN — Medication 10 ML: at 07:03

## 2021-10-05 RX ADMIN — HYDROXYUREA 500 MG: 500 CAPSULE ORAL at 09:41

## 2021-10-05 RX ADMIN — HYDROCODONE BITARTRATE AND ACETAMINOPHEN 1 TABLET: 10; 325 TABLET ORAL at 16:17

## 2021-10-05 RX ADMIN — ONDANSETRON HYDROCHLORIDE 4 MG: 2 INJECTION, SOLUTION INTRAMUSCULAR; INTRAVENOUS at 09:43

## 2021-10-05 RX ADMIN — DIPHENHYDRAMINE HYDROCHLORIDE 12.5 MG: 50 INJECTION, SOLUTION INTRAMUSCULAR; INTRAVENOUS at 16:16

## 2021-10-05 RX ADMIN — HYDROXYUREA 500 MG: 500 CAPSULE ORAL at 18:33

## 2021-10-05 RX ADMIN — DIPHENHYDRAMINE HYDROCHLORIDE 12.5 MG: 50 INJECTION, SOLUTION INTRAMUSCULAR; INTRAVENOUS at 00:33

## 2021-10-05 RX ADMIN — Medication 10 ML: at 16:25

## 2021-10-05 RX ADMIN — HYDROCODONE BITARTRATE AND ACETAMINOPHEN 1 TABLET: 10; 325 TABLET ORAL at 09:43

## 2021-10-05 NOTE — PROGRESS NOTES
Bedside and Verbal shift change report given to Elan Rosenthal (oncoming nurse) by Berenice Rojo (offgoing nurse). Report included the following information SBAR, Kardex, Intake/Output, MAR and Recent Results.

## 2021-10-05 NOTE — PROGRESS NOTES
Problem: Pain  Goal: *Control of Pain  Outcome: Progressing Towards Goal     Problem: Falls - Risk of  Goal: *Absence of Falls  Description: Document Mukund Fall Risk and appropriate interventions in the flowsheet.   Outcome: Progressing Towards Goal  Note: Fall Risk Interventions:            Medication Interventions: Teach patient to arise slowly, Patient to call before getting OOB         History of Falls Interventions: Room close to nurse's station

## 2021-10-05 NOTE — PROGRESS NOTES
0015 Bedside and Verbal shift change report given to Joe Arroyo RN (oncoming nurse) by Priti Hitchcock RN (offgoing nurse). Report included the following information SBAR, Kardex, Intake/Output and MAR.

## 2021-10-05 NOTE — PROGRESS NOTES
End of Shift Note    Bedside shift change report given to Dinesh Palacios RN (oncoming nurse) by Zabrina Bocanegra RN (offgoing nurse). Report included the following information SBAR, Kardex, Intake/Output, MAR and Recent Results    Shift worked:  0222-5363     Shift summary and any significant changes:     No significant changes. Pt rested well over night.      Concerns for physician to address:  none     Zone phone for oncoming shift:            Zabrina Bocanegra RN

## 2021-10-05 NOTE — PROGRESS NOTES
Comprehensive Nutrition Assessment    Type and Reason for Visit: Initial, RD nutrition re-screen/LOS    Nutrition Recommendations/Plan:  Continue regular diet     Nutrition Assessment:     Patient medically noted for sickle cell crisis. PMH HTN, depression, and GERD. Chart reviewed for length of stay. Patient sleeping soundly at time of attempted visit; didn't wake to knock on door and no family at bedside. She had eaten >70% of her lunch tray. No significant weight changes noted on chart review. Continue regular diet; encourage intake of meals. Patient Vitals for the past 168 hrs:   % Diet Eaten   10/04/21 1600 76 - 100%   10/04/21 0853 76 - 100%   10/01/21 0816 51 - 75%     Estimated Daily Nutrient Needs:  Energy (kcal): 2055 kcal (BMR 1581 x 1. 3AF); Weight Used for Energy Requirements: Current  Protein (g): 73-91g (0.8-1.0 g/kg bw); Weight Used for Protein Requirements: Current  Fluid (ml/day): 2000 mL; Method Used for Fluid Requirements: 1 ml/kcal    Nutrition Related Findings:       BM 10/2  Na 135, K+ 4.6, -746-00  Prozac, Folic Acid, Protonix, K2% IVF    Wounds:    None       Current Nutrition Therapies:  ADULT DIET Regular    Anthropometric Measures:  · Height:  5' 11\" (180.3 cm)  · Current Body Wt:  91.3 kg (201 lb 4.5 oz)    · BMI Category: Overweight (BMI 25.0-29. 9)       Nutrition Diagnosis:   No nutrition diagnosis at this time     Nutrition Interventions:   Food and/or Nutrient Delivery: Continue current diet  Nutrition Education and Counseling: No recommendations at this time  Coordination of Nutrition Care: No recommendation at this time    Goals:  PO intake >70% of meals next 5-7 days       Nutrition Monitoring and Evaluation:   Behavioral-Environmental Outcomes: None identified  Food/Nutrient Intake Outcomes: Food and nutrient intake  Physical Signs/Symptoms Outcomes: Biochemical data, Weight    Discharge Planning:    Continue current diet     Electronically signed by Santos Wan RD on 10/5/2021 at 2:40 PM    Contact: ext [phone]

## 2021-10-06 VITALS
RESPIRATION RATE: 12 BRPM | TEMPERATURE: 98.8 F | BODY MASS INDEX: 28.18 KG/M2 | HEART RATE: 87 BPM | WEIGHT: 201.28 LBS | OXYGEN SATURATION: 99 % | DIASTOLIC BLOOD PRESSURE: 91 MMHG | SYSTOLIC BLOOD PRESSURE: 149 MMHG | HEIGHT: 71 IN

## 2021-10-06 PROCEDURE — 74011250637 HC RX REV CODE- 250/637: Performed by: INTERNAL MEDICINE

## 2021-10-06 PROCEDURE — 74011250636 HC RX REV CODE- 250/636: Performed by: INTERNAL MEDICINE

## 2021-10-06 RX ORDER — FENTANYL 75 UG/H
1 PATCH TRANSDERMAL
Qty: 10 PATCH | Refills: 0 | Status: SHIPPED
Start: 2021-10-07 | End: 2021-10-22 | Stop reason: SDUPTHER

## 2021-10-06 RX ORDER — HYDROCODONE BITARTRATE AND ACETAMINOPHEN 10; 325 MG/1; MG/1
1 TABLET ORAL
Qty: 120 TABLET | Refills: 0 | Status: SHIPPED | OUTPATIENT
Start: 2021-10-06 | End: 2021-11-05

## 2021-10-06 RX ADMIN — HYDROCODONE BITARTRATE AND ACETAMINOPHEN 1 TABLET: 10; 325 TABLET ORAL at 09:44

## 2021-10-06 RX ADMIN — Medication 10 ML: at 05:42

## 2021-10-06 RX ADMIN — ENOXAPARIN SODIUM 40 MG: 40 INJECTION SUBCUTANEOUS at 09:46

## 2021-10-06 RX ADMIN — DIPHENHYDRAMINE HYDROCHLORIDE 12.5 MG: 50 INJECTION, SOLUTION INTRAMUSCULAR; INTRAVENOUS at 09:45

## 2021-10-06 RX ADMIN — HYDROCODONE BITARTRATE AND ACETAMINOPHEN 1 TABLET: 10; 325 TABLET ORAL at 04:38

## 2021-10-06 RX ADMIN — FOLIC ACID 1 MG: 1 TABLET ORAL at 09:45

## 2021-10-06 RX ADMIN — ONDANSETRON HYDROCHLORIDE 4 MG: 2 INJECTION, SOLUTION INTRAMUSCULAR; INTRAVENOUS at 09:45

## 2021-10-06 RX ADMIN — ONDANSETRON HYDROCHLORIDE 4 MG: 2 INJECTION, SOLUTION INTRAMUSCULAR; INTRAVENOUS at 04:36

## 2021-10-06 RX ADMIN — LOSARTAN POTASSIUM 100 MG: 100 TABLET, FILM COATED ORAL at 09:46

## 2021-10-06 RX ADMIN — DIPHENHYDRAMINE HYDROCHLORIDE 12.5 MG: 50 INJECTION, SOLUTION INTRAMUSCULAR; INTRAVENOUS at 04:36

## 2021-10-06 RX ADMIN — FLUOXETINE 10 MG: 10 CAPSULE ORAL at 09:45

## 2021-10-06 RX ADMIN — PANTOPRAZOLE SODIUM 40 MG: 40 TABLET, DELAYED RELEASE ORAL at 09:45

## 2021-10-06 RX ADMIN — HYDROXYUREA 500 MG: 500 CAPSULE ORAL at 09:41

## 2021-10-06 NOTE — PROGRESS NOTES
General Daily Progress Note    Admit Date: 9/28/2021    Subjective:     Patient has mild pain. .     Current Facility-Administered Medications   Medication Dose Route Frequency    HYDROcodone-acetaminophen (NORCO)  mg tablet 1 Tablet  1 Tablet Oral Q6H PRN    losartan (COZAAR) tablet 100 mg  100 mg Oral DAILY    diphenhydrAMINE (BENADRYL) injection 12.5 mg  12.5 mg IntraVENous Q4H PRN    fentaNYL (DURAGESIC) 75 mcg/hr patch 1 Patch  1 Patch TransDERmal Q72H    FLUoxetine (PROzac) capsule 10 mg  10 mg Oral DAILY    folic acid (FOLVITE) tablet 1 mg  1 mg Oral DAILY    hydroxyurea (HYDREA) chemo cap 500 mg  500 mg Oral BID    meclizine (ANTIVERT) tablet 25 mg  25 mg Oral TID PRN    pantoprazole (PROTONIX) tablet 40 mg  40 mg Oral ACB    sodium chloride (NS) flush 5-40 mL  5-40 mL IntraVENous Q8H    sodium chloride (NS) flush 5-40 mL  5-40 mL IntraVENous PRN    polyethylene glycol (MIRALAX) packet 17 g  17 g Oral DAILY PRN    ondansetron (ZOFRAN ODT) tablet 4 mg  4 mg Oral Q8H PRN    Or    ondansetron (ZOFRAN) injection 4 mg  4 mg IntraVENous Q6H PRN    enoxaparin (LOVENOX) injection 40 mg  40 mg SubCUTAneous DAILY        Review of Systems  A comprehensive review of systems was negative. Objective:     Patient Vitals for the past 24 hrs:   BP Temp Pulse Resp SpO2 Height   10/05/21 1614 (!) 160/89 99 °F (37.2 °C) 85 18 96 %    10/05/21 1438      5' 11\" (1.803 m)   10/05/21 0859 134/81 98.7 °F (37.1 °C) 81 18 100 %    10/05/21 0400 121/71 98.8 °F (37.1 °C) 78 18 98 %    10/05/21 0034 123/77 98.8 °F (37.1 °C) 79 18 98 %      No intake/output data recorded.   10/04 0701 - 10/05 1900  In: 360 [P.O.:360]  Out: -     Physical Exam:   Visit Vitals  BP (!) 160/89 (BP 1 Location: Right upper arm, BP Patient Position: At rest)   Pulse 85   Temp 99 °F (37.2 °C)   Resp 18   Ht 5' 11\" (1.803 m)   Wt 201 lb 4.5 oz (91.3 kg)   SpO2 96%   Breastfeeding No   BMI 28.07 kg/m²     General appearance: alert, cooperative, no distress, appears stated age  Neck: supple, symmetrical, trachea midline, no adenopathy, thyroid: not enlarged, symmetric, no tenderness/mass/nodules, no carotid bruit and no JVD  Lungs: clear to auscultation bilaterally  Heart: regular rate and rhythm, S1, S2 normal, no murmur, click, rub or gallop  Abdomen: soft, non-tender. Bowel sounds normal. No masses,  no organomegaly  Extremities: extremities normal, atraumatic, no cyanosis or edema    Assessment:     Active Problems:    Sickle cell crisis ([facility] [address] (5/22/2017)        Plan:     1.  DC parenteral analgesic. Plan discharge tomorrow if all goes well. 2.  Continue blood pressure control.

## 2021-10-06 NOTE — DISCHARGE INSTRUCTIONS
General Discharge Instructions    Patient ID:  Michelle Delatorre  [id number]  63 y.o.  1960    Patient Instructions        The following personal items were collected during your admission and were returned to you. Take Home Medications           What to do at Home    Recommended diet: Regular Diet    Recommended activity: Activity as tolerated    Follow-up with Rasheed Griffin MD  in 4 days. Information obtained by :  I understand that if any problems occur once I am at home I am to contact my physician. I understand and acknowledge receipt of the instructions indicated above.                                                                                                                                            Physician's or R.N.'s Signature                                                                  Date/Time                                                                                                                                              Patient or Representative Signature                                                          Date/Time

## 2021-10-06 NOTE — PROGRESS NOTES
Bedside and Verbal shift change report given to Aleja (oncoming nurse) by Kim Winchester (offgoing nurse). Report included the following information SBAR, Kardex, Intake/Output, MAR, Accordion and Recent Results.

## 2021-10-06 NOTE — PROGRESS NOTES
Patient discharge home with family. Copy of discharge instructions given to patient prior to discharge. IV removed. All cabinets checked for patient belongings and given to patient prior to discharge.

## 2021-10-07 ENCOUNTER — PATIENT OUTREACH (OUTPATIENT)
Dept: CASE MANAGEMENT | Age: 61
End: 2021-10-07

## 2021-10-07 NOTE — DISCHARGE SUMMARY
[address]    Name:  Maryellen Thorpe  MR#:  [id number]  :  1960  ACCOUNT #:  [de-identified]  ADMIT DATE:  2021  DISCHARGE DATE:  10/06/2021    HISTORY OF PRESENT ILLNESS:  The patient is a 62-year-old lady who was doing well up until several days prior to admission when she began having increasing pain in her back, legs, and arms. She has analgesics at home and in spite of taking these, there was no meaningful improvement. Because of the persistent pain, the patient presented to the emergency room and was subsequently admitted for painful sickle crisis. Past medical history, social history, review of systems, family history, and physical examination is as in the admitting H and P.    LABORATORY VALUES:  Initial hemoglobin is 9.4, white count 4.2, MCV was 109.8 with a normal differential.  At the time of discharge, white count was 6.2, and hemoglobin 8.4. Abnormalities on the comprehensive profile limited to a bilirubin of 1.8, and AST of 43. On 10/02, bilirubin was 0.8. Blood cultures were negative. RADIOGRAPHS:  Chest x-ray is negative. HOSPITAL COURSE:  The patient was admitted and placed on parenteral analgesics with her Phenergan and Benadryl. With this combination, she did remarkably well with gradual improvement in her pain. Towards the later portion of the hospital stay, she had improved to the point where parenteral medications were discontinued and she was transitioned to p.o. and was subsequently discharged home. Hospital course was otherwise uncomplicated other than an increase in her blood pressure, which was treated with losartan. This is not her normal antihypertensive medication, however. FINAL DIAGNOSES:  1.  Painful sickle crisis. 2.  SC hemoglobinopathy. 3.  Primary hypertension. 4.  Overweight. 5.  History of depression. DISPOSITION:  1. The patient will be discharged home ambulatory on a regular diet.   2. Discharge medications include hydrocortisone cream apply locally b.i.d., Zofran 4 mg q. 8 hours p.r.n., Phenergan 25 mg q. 8 hours p.r.n., fluoxetine 10 mg daily, folic acid 1 mg daily, meclizine 25 mg t.i.d. p.r.n., fentanyl patch q. 3 days 75 mcg, hydroxyurea 500 mg b.i.d., omeprazole 20 mg daily, telmisartan 40 mg daily as well as hydrocodone 10/325 one tablet q. 6 hours p.r.n.  3.  The patient will return to the office in the next 3-4 days.         MD ERIK Dumont/[id number]  D:  10/06/2021 9:12  T:  10/07/2021 6:04  JOB #:  [id number]

## 2021-10-08 NOTE — PROGRESS NOTES
Care Transitions Outreach Attempt    Call within 2 business days of discharge: Yes   Attempted to reach patient for transitions of care follow up. Unable to reach patient. Patient: Petty Brandon Patient : 1960 MRN: [id number]    Last Discharge [address] [facility]       Complaint Diagnosis Description Type Department Provider    21 Sickle Cell Crisis Sickle cell crisis ([address] . .. ED to Hosp-Admission (Discharged) (ADMIT) Neisha Card MD; Isidra Perez... Was this an external facility discharge?  No    Noted following upcoming appointments from discharge chart review:   [address] follow up appointment(s):   Future Appointments   Date Time Provider Waldo Calvo   10/13/2021  9:15 AM Camille Rahman MD [facility] MAIN BS AMB   10/26/2021 12:30 PM Camille Rahman MD [facility] MAIN BS AMB

## 2021-10-21 ENCOUNTER — PATIENT OUTREACH (OUTPATIENT)
Dept: CASE MANAGEMENT | Age: 61
End: 2021-10-21

## 2021-10-21 NOTE — PROGRESS NOTES
Care Transitions Outreach Attempt    Attempted to reach patient for transitions of care follow up X3. Unable to reach patient. KRISTINA episode resolved at this itme      Patient: Deniz Goddard Patient : 1960 MRN: [id number]    Last Discharge [address] Facility       Complaint Diagnosis Description Type Department Provider    21 Sickle Cell Crisis Sickle cell crisis ([facility] [address] . .. ED to Hosp-Admission (Discharged) (ADMIT) Keila Fleming MD; Isidra Eid... Per review of chart patient has cancelled appt with PCP now scheduled for 21.     Noted following upcoming appointments from discharge chart review:   [address] follow up appointment(s):   Future Appointments   Date Time Provider Waldo Calvo   2021 10:45 AM Greg Stallworth MD [facility] AMB

## 2021-10-22 DIAGNOSIS — D57.00 SICKLE CELL DISEASE WITH CRISIS (HCC): ICD-10-CM

## 2021-10-23 RX ORDER — FENTANYL 75 UG/H
1 PATCH TRANSDERMAL
Qty: 10 PATCH | Refills: 0 | Status: SHIPPED | OUTPATIENT
Start: 2021-10-23 | End: 2021-11-22

## 2021-11-03 ENCOUNTER — OFFICE VISIT (OUTPATIENT)
Dept: INTERNAL MEDICINE CLINIC | Age: 61
End: 2021-11-03
Payer: MEDICARE

## 2021-11-03 VITALS
DIASTOLIC BLOOD PRESSURE: 74 MMHG | HEIGHT: 71 IN | RESPIRATION RATE: 16 BRPM | OXYGEN SATURATION: 97 % | SYSTOLIC BLOOD PRESSURE: 144 MMHG | HEART RATE: 95 BPM | TEMPERATURE: 98 F | WEIGHT: 199.2 LBS | BODY MASS INDEX: 27.89 KG/M2

## 2021-11-03 DIAGNOSIS — I10 PRIMARY HYPERTENSION: ICD-10-CM

## 2021-11-03 DIAGNOSIS — D57.00 SICKLE CELL DISEASE WITH CRISIS (HCC): Primary | ICD-10-CM

## 2021-11-03 DIAGNOSIS — E66.3 OVERWEIGHT: ICD-10-CM

## 2021-11-03 DIAGNOSIS — F32.9 REACTIVE DEPRESSION: ICD-10-CM

## 2021-11-03 PROCEDURE — G8753 SYS BP > OR = 140: HCPCS | Performed by: INTERNAL MEDICINE

## 2021-11-03 PROCEDURE — G8754 DIAS BP LESS 90: HCPCS | Performed by: INTERNAL MEDICINE

## 2021-11-03 PROCEDURE — 1111F DSCHRG MED/CURRENT MED MERGE: CPT | Performed by: INTERNAL MEDICINE

## 2021-11-03 PROCEDURE — 99214 OFFICE O/P EST MOD 30 MIN: CPT | Performed by: INTERNAL MEDICINE

## 2021-11-03 PROCEDURE — G8427 DOCREV CUR MEDS BY ELIG CLIN: HCPCS | Performed by: INTERNAL MEDICINE

## 2021-11-03 PROCEDURE — G9717 DOC PT DX DEP/BP F/U NT REQ: HCPCS | Performed by: INTERNAL MEDICINE

## 2021-11-03 PROCEDURE — G8419 CALC BMI OUT NRM PARAM NOF/U: HCPCS | Performed by: INTERNAL MEDICINE

## 2021-11-03 PROCEDURE — 3017F COLORECTAL CA SCREEN DOC REV: CPT | Performed by: INTERNAL MEDICINE

## 2021-11-03 NOTE — PROGRESS NOTES
Chief Complaint   Patient presents with   [address]     Patient is here for a hospital follow up. 1. Have you been to the ER, urgent care clinic since your last visit? Hospitalized since your last visit? Yes Reason for visit: Isabel coles    2. Have you seen or consulted any other health care providers outside of the [address] [place] [address] since your last visit? Include any pap smears or colon screening.  No

## 2021-11-04 NOTE — PROGRESS NOTES
[facility]  [place] [address]  [address] [place] [address]  Phone:  [phone]  Fax: [phone]       Chief Complaint   Patient presents with   Anuradha [address]     Patient is here for a hospital follow up. .      SUBJECTIVE:    Jolynn Dempsey is a 63 y.o. female comes in for return visit stating that she has done reasonably well. She has not had any major flare ups since she was discharged from the hospital.    She visited her daughter and son-in-law in [address] [place] [address]. She has a past history of primary hypertension, dyslipidemia, as well as depression. Unfortunately she has not lost any meaningful weight either. Current Outpatient Medications   Medication Sig Dispense Refill    fentaNYL (DURAGESIC) 75 mcg/hr 1 Patch by TransDERmal route every seventy-two (72) hours for 30 days. Max Daily Amount: 1 Patch. 10 Patch 0    HYDROcodone-acetaminophen (NORCO)  mg tablet Take 1 Tablet by mouth every six (6) hours as needed for Pain for up to 30 days. Max Daily Amount: 4 Tablets. 120 Tablet 0    meclizine (ANTIVERT) 25 mg tablet Take 1 Tablet by mouth three (3) times daily as needed for Dizziness. 20 Tablet 0    ondansetron (Zofran ODT) 4 mg disintegrating tablet Take 1 Tablet by mouth every eight (8) hours as needed for Nausea. 10 Tablet 0    telmisartan (MICARDIS) 40 mg tablet Take 1 Tablet by mouth nightly. 30 Tablet 11    FLUoxetine (PROzac) 10 mg capsule Take 1 Capsule by mouth daily. 30 Capsule 4    promethazine (PHENERGAN) 25 mg tablet TAKE 1 TABLET BY MOUTH EVERY 8 HOURS AS NEEDED FOR NAUSEA 90 Tablet 11    hydrocortisone (Cortisone) 1 % topical cream Apply  to affected area two (2) times daily as needed for Skin Irritation. use thin layer 30 g 11    folic acid (FOLVITE) 1 mg tablet Take 1 Tab by mouth daily.  30 Tab 11    hydroxyurea (HYDREA) 500 mg capsule TAKE 1 CAPSULE BY MOUTH TWICE DAILY 60 Cap 11    omeprazole (PRILOSEC OTC) 20 mg tablet Take 20 mg by mouth nightly.        Past Medical History:   Diagnosis Date    Anemia     SC hemoglobinopathy    Chronic pain     Depression     Hypertension     Liver disease     hepatitis C; pt reports \"cured\"    Sickle cell disease ([address]      Past Surgical History:   Procedure Laterality Date    HX BREAST BIOPSY Right 05/2007    negative    HX CHOLECYSTECTOMY      HX ORTHOPAEDIC      bony curettage of an ostial myelitis focus    IL BREAST SURGERY PROCEDURE UNLISTED      2 cysts removed from R breast    [place] CHEST SURGERY PROCEDURE UNLISTED      status post thor for removal of a benign      Allergies   Allergen Reactions    Dilaudid [Hydromorphone (Bulk)] Itching     Can tolerate with benadryl and ondansetron    Percocet [Oxycodone-Acetaminophen] Itching         REVIEW OF SYSTEMS:  General: negative for - chills or fever  ENT: negative for - headaches, nasal congestion or tinnitus  Respiratory: negative for - cough, hemoptysis, shortness of breath or wheezing  Cardiovascular : negative for - chest pain, edema, palpitations or shortness of breath  Gastrointestinal: negative for - abdominal pain, blood in stools, heartburn or nausea/vomiting  Genito-Urinary: no dysuria, trouble voiding, or hematuria  Musculoskeletal: negative for - gait disturbance, joint pain, joint stiffness or joint swelling  Neurological: no TIA or stroke symptoms  Hematologic: no bruises, no bleeding, no swollen glands  Integument: no lumps, mole changes, nail changes or rash  Endocrine: no malaise/lethargy or unexpected weight changes      Social History     Socioeconomic History    Marital status:     Number of children: 2   Occupational History    Occupation: disabled---Sickle cell disease   Tobacco Use    Smoking status: Never Smoker    Smokeless tobacco: Never Used   Vaping Use    Vaping Use: Never used   Substance and Sexual Activity    Alcohol use: No    Drug use: No    Sexual activity: Yes     Partners: Male     Family History   Problem Relation Age of Onset    Hypertension Mother     Hypertension Father        OBJECTIVE:    Visit Vitals  BP (!) 144/74   Pulse 95   Temp 98 °F (36.7 °C)   Resp 16   Ht 5' 11\" (1.803 m)   Wt 199 lb 3.2 oz (90.4 kg)   SpO2 97%   BMI 27.78 kg/m²     CONSTITUTIONAL: well , well nourished, appears age appropriate  EYES: perrla, eom intact  ENMT:moist mucous membranes, pharynx clear  NECK: supple. Thyroid normal  RESPIRATORY: Chest: clear to ascultation and percussion   CARDIOVASCULAR: Heart: regular rate and rhythm  GASTROINTESTINAL: Abdomen: soft, bowel sounds active  HEMATOLOGIC: no pathological lymph nodes palpated  MUSCULOSKELETAL: Extremities: no edema, pulse 1+   INTEGUMENT: No unusual rashes or suspicious skin lesions noted. Nails appear normal.  NEUROLOGIC: non-focal exam   MENTAL STATUS: alert and oriented, appropriate affect      ASSESSMENT:  1. Sickle cell disease with crisis ([address]    2. Overweight    3. Reactive depression    4. Primary hypertension        PLAN:  1. The patient's sickle cell disease appears to be reasonably stable. She continues her analgesic usage which prevents her from being hospitalized. She also is on hydroxyurea. 2. She needs to lose weight. This can be accomplished by eating meals, eliminating snacks, and avoiding the consumption of processed carbohydrates. 3. She has reactive depression and is doing reasonably well. Both of her children are no longer in the [place] area. She states that she plans to sell her home and live in an assisted living setting. 4. Blood pressure is slightly elevated, no adjustment is made. I suggest she lose weight, increase her physical activity and reduce her salt intake. Follow-up and Dispositions    · Return in about 3 months (around 2/3/2022).            Yeison Kaye MD

## 2021-11-08 DIAGNOSIS — D57.00 SICKLE CELL CRISIS (HCC): Primary | ICD-10-CM

## 2021-11-08 RX ORDER — HYDROCODONE BITARTRATE AND ACETAMINOPHEN 10; 325 MG/1; MG/1
1 TABLET ORAL
Qty: 120 TABLET | Refills: 0 | Status: SHIPPED | OUTPATIENT
Start: 2021-11-08 | End: 2021-12-08 | Stop reason: SDUPTHER

## 2021-11-22 RX ORDER — HYDROXYUREA 500 MG/1
CAPSULE ORAL
Qty: 60 CAPSULE | Refills: 11 | Status: SHIPPED | OUTPATIENT
Start: 2021-11-22 | End: 2022-10-28

## 2021-12-08 DIAGNOSIS — D57.00 SICKLE CELL CRISIS (HCC): ICD-10-CM

## 2021-12-08 RX ORDER — FENTANYL 75 UG/H
1 PATCH TRANSDERMAL
Qty: 10 PATCH | Refills: 0 | Status: SHIPPED | OUTPATIENT
Start: 2021-12-08 | End: 2022-01-07

## 2021-12-08 RX ORDER — HYDROCODONE BITARTRATE AND ACETAMINOPHEN 10; 325 MG/1; MG/1
1 TABLET ORAL
Qty: 120 TABLET | Refills: 0 | Status: SHIPPED | OUTPATIENT
Start: 2021-12-08 | End: 2022-01-07

## 2021-12-14 NOTE — PROGRESS NOTES
General Daily Progress Note    Admit Date: 5/22/2017    Subjective:     Patient has no complaint . Current Facility-Administered Medications   Medication Dose Route Frequency    HYDROcodone-acetaminophen (NORCO)  mg tablet 1 Tab  1 Tab Oral Q6H PRN    fentaNYL (DURAGESIC) 75 mcg/hr patch 1 Patch  1 Patch TransDERmal Q72H    ondansetron (ZOFRAN ODT) tablet 4 mg  4 mg Oral Q6H PRN    diphenhydrAMINE (BENADRYL) capsule 50 mg  50 mg Oral Q4H PRN    famotidine (PEPCID) tablet 20 mg  20 mg Oral BID    folic acid (FOLVITE) tablet 1 mg  1 mg Oral DAILY    nitroglycerin (NITROBID) 2 % ointment 1 Inch  1 Inch Topical Q6H PRN    acetaminophen (TYLENOL) tablet 650 mg  650 mg Oral Q6H PRN    enoxaparin (LOVENOX) injection 40 mg  40 mg SubCUTAneous Q24H    valsartan (DIOVAN) tablet 80 mg  80 mg Oral DAILY        Review of Systems  A comprehensive review of systems was negative. Objective:     Patient Vitals for the past 24 hrs:   BP Temp Pulse Resp SpO2   05/30/17 0731 115/62 98.8 °F (37.1 °C) 83 18 96 %   05/29/17 2350 110/75 98.5 °F (36.9 °C) 83 18 99 %   05/29/17 1956 128/61 99 °F (37.2 °C) 85 18 100 %   05/29/17 1431 135/83 98.9 °F (37.2 °C) 90 18 99 %   05/29/17 1137 119/72 98.5 °F (36.9 °C) 76 18 99 %             Physical Exam:   Visit Vitals    /62    Pulse 83    Temp 98.8 °F (37.1 °C)    Resp 18    Ht 5' 10\" (1.778 m)    Wt 189 lb 13.1 oz (86.1 kg)    SpO2 96%    BMI 27.24 kg/m2     General appearance: alert, cooperative, no distress, appears stated age  Neck: supple, symmetrical, trachea midline, no adenopathy, thyroid: not enlarged, symmetric, no tenderness/mass/nodules, no carotid bruit and no JVD  Lungs: clear to auscultation bilaterally  Heart: regular rate and rhythm, S1, S2 normal, no murmur, click, rub or gallop  Abdomen: soft, non-tender.  Bowel sounds normal. No masses,  no organomegaly  Extremities: extremities normal, atraumatic, no cyanosis or edema        Data Review PCP:  Loyd Reid MD    Chief Complaint   Patient presents with   • Left Knee - Pain   • Right Shoulder - Pain   • Office Visit       HPI:  Gold Goncalves is a 35 year old male who presents today for his Left Knee and Right Shoulder.     He states 2 years ago he hit his Left Knee with a hammer at work and every now and then he get pains in his Left Knee. He states he has pain on the Anterior Aspect of the Left Knee, he states his Left Knee will pop at times.   Complains of occasional catching of his left knee.  Localizes the pain behind the kneecap and along the medial joint line.  Complains of clicking.    He states he has had pain for the last 2 months in his Right Shoulder, he states he believes he hurt it while trying to disconnect a trailer from the truck while he was at work. He states he has pain when Flexing and Abducting the Right Shoulder. He states he has pain on the Superior Aspect of the Right Shoulder, he states he has tried Icy Hot for his pain in his Right Shoulder.  Overall his shoulder symptoms are improving, he has noticed in the improvement in his range of motion.  Still has some difficulty with overhead activities.    Patient was referred for Consultation by Loyd Reid MD     Visit Vitals  Ht 5' 6\" (1.676 m)   Wt 84.6 kg (186 lb 8.2 oz)   BMI 30.10 kg/m²       Pain Scale: 5  Date of Injury:  Left Knee: 2 Year, Right Shoulder: 2 months  Work Related: no  Current Position:       Past Medical History:   Diagnosis Date   • Diabetes mellitus (CMS/HCC)    • Diabetes mellitus, type 2 (CMS/HCC)    • Elevated liver enzymes    • Fatty liver    • Hyperlipidemia        Past Surgical History:   Procedure Laterality Date   • Oral surgery procedure         Social History     Socioeconomic History   • Marital status: /Civil Union     Spouse name: Not on file   • Number of children: Not on file   • Years of education: Not on file   • Highest education level: Not on file    Occupational History   • Not on file   Tobacco Use   • Smoking status: Former Smoker     Packs/day: 0.00   • Smokeless tobacco: Never Used   Vaping Use   • Vaping Use: never used   Substance and Sexual Activity   • Alcohol use: Yes   • Drug use: Not Currently   • Sexual activity: Not on file   Other Topics Concern   • Not on file   Social History Narrative   • Not on file     Social Determinants of Health     Financial Resource Strain:    • Social Determinants: Financial Resource Strain: Not on file   Food Insecurity:    • Social Determinants: Food Insecurity: Not on file   Transportation Needs:    • Lack of Transportation (Medical): Not on file   • Lack of Transportation (Non-Medical): Not on file   Physical Activity:    • Days of Exercise per Week: Not on file   • Minutes of Exercise per Session: Not on file   Stress:    • Social Determinants: Stress: Not on file   Social Connections:    • Social Determinants: Social Connections: Not on file   Intimate Partner Violence: Not At Risk   • Social Determinants: Intimate Partner Violence Past Fear: No   • Social Determinants: Intimate Partner Violence Current Fear: No       ALLERGIES:  No Known Allergies    Current Outpatient Medications   Medication Sig Dispense Refill   • Cholecalciferol (Vitamin D) 125 MCG (5000 UT) Cap Take 1 capsule by mouth daily.     • Trulicity 0.75 MG/0.5ML pen-injector Inject 0.75 mg into the skin every 7 days. On Tuesday     • pioglitazone (ACTOS) 15 MG tablet Take 15 mg by mouth every morning.      • repaglinide (PRANDIN) 2 MG tablet Take 2 mg by mouth 3 times daily (before meals).     • rosuvastatin (CRESTOR) 10 MG tablet Take 10 mg by mouth every evening.     • metformin (GLUCOPHAGE) 1000 MG tablet Take 1 tablet by mouth 2 times daily (with meals). 60 tablet 3   • empagliflozin (JARDIANCE) 25 MG tablet Take 1 tablet by mouth daily. 90 tablet 1   • cyclobenzaprine (FLEXERIL) 10 MG tablet Take 1 tablet by mouth nightly as needed for Muscle  Recent Results (from the past 24 hour(s))   CBC WITH AUTOMATED DIFF    Collection Time: 05/29/17  3:28 PM   Result Value Ref Range    WBC 8.8 3.6 - 11.0 K/uL    RBC 2.27 (L) 3.80 - 5.20 M/uL    HGB 7.9 (L) 11.5 - 16.0 g/dL    HCT 22.4 (L) 35.0 - 47.0 %    MCV 98.7 80.0 - 99.0 FL    MCH 34.8 (H) 26.0 - 34.0 PG    MCHC 35.3 30.0 - 36.5 g/dL    RDW 15.8 (H) 11.5 - 14.5 %    PLATELET 963 719 - 628 K/uL    NEUTROPHILS 46 32 - 75 %    LYMPHOCYTES 34 12 - 49 %    MONOCYTES 17 (H) 5 - 13 %    EOSINOPHILS 3 0 - 7 %    BASOPHILS 0 0 - 1 %    ABS. NEUTROPHILS 4.1 1.8 - 8.0 K/UL    ABS. LYMPHOCYTES 3.0 0.8 - 3.5 K/UL    ABS. MONOCYTES 1.5 (H) 0.0 - 1.0 K/UL    ABS. EOSINOPHILS 0.2 0.0 - 0.4 K/UL    ABS. BASOPHILS 0.0 0.0 - 0.1 K/UL   LD    Collection Time: 05/29/17  3:28 PM   Result Value Ref Range     (H) 81 - 565 U/L   METABOLIC PANEL, BASIC    Collection Time: 05/29/17  3:28 PM   Result Value Ref Range    Sodium 141 136 - 145 mmol/L    Potassium 3.9 3.5 - 5.1 mmol/L    Chloride 105 97 - 108 mmol/L    CO2 30 21 - 32 mmol/L    Anion gap 6 5 - 15 mmol/L    Glucose 99 65 - 100 mg/dL    BUN 8 6 - 20 MG/DL    Creatinine 0.79 0.55 - 1.02 MG/DL    BUN/Creatinine ratio 10 (L) 12 - 20      GFR est AA >60 >60 ml/min/1.73m2    GFR est non-AA >60 >60 ml/min/1.73m2    Calcium 8.6 8.5 - 10.1 MG/DL   RETICULOCYTE COUNT    Collection Time: 05/29/17  3:28 PM   Result Value Ref Range    Reticulocyte count 9.0 (H) 0.7 - 2.1 %   MAGNESIUM    Collection Time: 05/29/17  3:28 PM   Result Value Ref Range    Magnesium 2.3 1.6 - 2.4 mg/dL           Assessment:     Active Problems:    Hypertension (10/3/2014)      Hepatitis C (10/3/2014)      Sickle cell crisis ([facility] (5/22/2017)      NSVT (nonsustained ventricular tachycardia) ([facility] (5/29/2017)        Plan: 1. Pt had run of NSVT. This will be evaluated today with Echo and stress test.  2.Will transition to home med regimen for SS. spasms. 20 tablet 0     No current facility-administered medications for this visit.         Review of Systems:  All systems reviewed and are negative except as noted above or in the HPI/Patient intake form.    Physical Exam:  Constitutional: Oriented to person, place, and time. Appears well-developed and well-nourished. No distress.   HENT:   Head: Normocephalic and atraumatic.   Nose: No nasal deformity.   Mouth/Throat: Normal dentition.   Eyes: Conjunctivae and EOM are normal.   Cardiovascular: Normal rate and intact distal pulses.   Pulmonary/Chest: Effort normal. No stridor. No respiratory distress.   Neurological: Alert and oriented to person, place, and time.   Skin: Skin is warm and intact.   Psychiatric: Has a normal mood and affect. Behavior is normal.     Left Knee Exam     Muscle Strength   The patient has normal left knee strength.    Tenderness   The patient is experiencing tenderness in the medial joint line.    Range of Motion   Extension: 0   Flexion: 130     Tests   Abhijit:  Medial - positive Lateral - negative  Varus: negative Valgus: negative  Lachman:  Anterior - negative      Drawer:  Anterior - negative     Posterior - negative  Patellar apprehension: negative    Other   Erythema: absent  Sensation: normal  Pulse: present  Swelling: mild  Effusion: no effusion present      Right Shoulder Exam     Tenderness   The patient is experiencing tenderness in the acromioclavicular joint.    Range of Motion   Active abduction: 160   Passive abduction: 170   Forward flexion: 160     Muscle Strength   Abduction: 5/5   Internal rotation: 5/5   External rotation: 5/5     Tests   [place] test: positive  Cross arm: negative  Impingement: positive  Drop arm: negative  Sulcus: absent    Other   Erythema: absent            Imaging Reading/Results:    XR SHOULDER 3 VIEWS RIGHT  Indication right shoulder pain    AP, scapular Y, axillary view of right shoulder x-rays show no evidence of   fracture or dislocation.   No significant arthritic changes.  Neutral   alignment.    Impression: Unremarkable right shoulder exam  XR KNEE MIN 4 VIEWS LEFT  Indication: Left knee pain    AP, lateral, sunrise, PA flexion view of left knee x-rays show no evidence   of fracture or dislocation.  No significant arthritic changes.  Neutral   alignment.  No soft tissue mass.    Impression: Unremarkable left knee exam        Assessment/Plan:    Chronic pain of left knee  - XR KNEE MIN 4 VIEWS LEFT  - MRI KNEE LEFT WO CONTRAST; Future    Rotator cuff impingement syndrome of right shoulder  - XR SHOULDER 3 VIEWS RIGHT  - SERVICE TO PHYSICAL THERAPY    Internal derangement of left knee  - MRI KNEE LEFT WO CONTRAST; Future      As for the left knee, I am concerned about soft tissue injury such as a meniscus tear.  Complains of pain behind the kneecap and along the medial joint line.  Has occasional mechanical symptoms of catching and has a positive Abhijit's.  I would like to order a MRI.    In regards to the right shoulder, likely rotator cuff impingement syndrome.  His symptoms are improving.  He would like to try physical therapy.    I will see the patient back once the MRI is complete on the left knee.      Elio Cox M.D    Orthopedic Surgery  [facility]  P: [phone]  F: [phone]      SCRIBE SIGNATURE    RACHEL Weaver, and Viridiana SULLIVAN scribed the services personally performed by Dr. Cox.    SCRIBE ATTESTATION    Portions of this note were transcribed by RACHEL Weaver and Viridiana SULLIVAN. I, Dr. Cox personally performed the history, physical exam and medical decision making; and confirmed the accuracy of the information in the transcribed note.

## 2021-12-28 ENCOUNTER — NURSE TRIAGE (OUTPATIENT)
Dept: OTHER | Facility: CLINIC | Age: 61
End: 2021-12-28

## 2021-12-28 NOTE — TELEPHONE ENCOUNTER
Received call from [place] at [facility] with Red Flag Complaint. Caller's telephone number verified as [phone]    Unsuccessful attempt to re-connect with caller via phone, unable to leave message for callback    Attention Provider: Thank you for allowing me to participate in the care of your patient. The patient was connected to triage in response to information provided to the [facility]. Please do not respond through this encounter as the response is not directed to a shared pool. Reason for Disposition   Second attempt to contact caller AND no contact made. Phone number verified.     Protocols used: NO CONTACT OR DUPLICATE CONTACT CALL-ADULT-[place]

## 2022-01-05 ENCOUNTER — OFFICE VISIT (OUTPATIENT)
Dept: INTERNAL MEDICINE CLINIC | Age: 62
End: 2022-01-05
Payer: MEDICARE

## 2022-01-05 VITALS
WEIGHT: 194 LBS | HEIGHT: 71 IN | SYSTOLIC BLOOD PRESSURE: 141 MMHG | DIASTOLIC BLOOD PRESSURE: 89 MMHG | BODY MASS INDEX: 27.16 KG/M2 | RESPIRATION RATE: 16 BRPM | TEMPERATURE: 99.3 F | HEART RATE: 93 BPM | OXYGEN SATURATION: 99 %

## 2022-01-05 DIAGNOSIS — E78.5 DYSLIPIDEMIA: ICD-10-CM

## 2022-01-05 DIAGNOSIS — D57.00 SICKLE CELL DISEASE WITH CRISIS (HCC): ICD-10-CM

## 2022-01-05 DIAGNOSIS — I10 PRIMARY HYPERTENSION: ICD-10-CM

## 2022-01-05 DIAGNOSIS — G93.40 ENCEPHALOPATHY: Primary | ICD-10-CM

## 2022-01-05 PROCEDURE — G8754 DIAS BP LESS 90: HCPCS | Performed by: INTERNAL MEDICINE

## 2022-01-05 PROCEDURE — G8419 CALC BMI OUT NRM PARAM NOF/U: HCPCS | Performed by: INTERNAL MEDICINE

## 2022-01-05 PROCEDURE — G8753 SYS BP > OR = 140: HCPCS | Performed by: INTERNAL MEDICINE

## 2022-01-05 PROCEDURE — 99214 OFFICE O/P EST MOD 30 MIN: CPT | Performed by: INTERNAL MEDICINE

## 2022-01-05 PROCEDURE — G9717 DOC PT DX DEP/BP F/U NT REQ: HCPCS | Performed by: INTERNAL MEDICINE

## 2022-01-05 PROCEDURE — G8427 DOCREV CUR MEDS BY ELIG CLIN: HCPCS | Performed by: INTERNAL MEDICINE

## 2022-01-05 PROCEDURE — 3017F COLORECTAL CA SCREEN DOC REV: CPT | Performed by: INTERNAL MEDICINE

## 2022-01-05 NOTE — PROGRESS NOTES
Chief Complaint   Patient presents with    Numbness     Patient in office with complaints of burning/numbness in hands and feet.  Other     Patient states that she became \"very\" sick over the Christmas holiday. She states that she was dizzy, experienced a couple of falls and incoherent. 1. Have you been to the ER, urgent care clinic since your last visit? Hospitalized since your last visit? No    2. Have you seen or consulted any other health care providers outside of the [address] [place] [address] since your last visit? Include any pap smears or colon screening.  No

## 2022-01-07 LAB
ALBUMIN SERPL-MCNC: 3.6 G/DL (ref 3.5–5)
ALBUMIN/GLOB SERPL: 0.8 {RATIO} (ref 1.1–2.2)
ALP SERPL-CCNC: 91 U/L (ref 45–117)
ALT SERPL-CCNC: 14 U/L (ref 12–78)
ANION GAP SERPL CALC-SCNC: 9 MMOL/L (ref 5–15)
APPEARANCE UR: CLEAR
AST SERPL-CCNC: 24 U/L (ref 15–37)
BACTERIA URNS QL MICRO: ABNORMAL /HPF
BASOPHILS # BLD: 0.1 K/UL (ref 0–0.1)
BASOPHILS NFR BLD: 1 % (ref 0–1)
BILIRUB SERPL-MCNC: 1.3 MG/DL (ref 0.2–1)
BILIRUB UR QL: NEGATIVE
BUN SERPL-MCNC: 4 MG/DL (ref 6–20)
BUN/CREAT SERPL: 7 (ref 12–20)
CALCIUM SERPL-MCNC: 8.9 MG/DL (ref 8.5–10.1)
CHLORIDE SERPL-SCNC: 107 MMOL/L (ref 97–108)
CO2 SERPL-SCNC: 22 MMOL/L (ref 21–32)
COLOR UR: ABNORMAL
CREAT SERPL-MCNC: 0.59 MG/DL (ref 0.55–1.02)
DIFFERENTIAL METHOD BLD: ABNORMAL
EOSINOPHIL # BLD: 0.1 K/UL (ref 0–0.4)
EOSINOPHIL NFR BLD: 1 % (ref 0–7)
EPITH CASTS URNS QL MICRO: ABNORMAL /LPF
ERYTHROCYTE [DISTWIDTH] IN BLOOD BY AUTOMATED COUNT: 14.7 % (ref 11.5–14.5)
GLOBULIN SER CALC-MCNC: 4.7 G/DL (ref 2–4)
GLUCOSE SERPL-MCNC: 97 MG/DL (ref 65–100)
GLUCOSE UR STRIP.AUTO-MCNC: NEGATIVE MG/DL
HCT VFR BLD AUTO: 26.1 % (ref 35–47)
HGB BLD-MCNC: 9 G/DL (ref 11.5–16)
HGB UR QL STRIP: NEGATIVE
HYALINE CASTS URNS QL MICRO: ABNORMAL /LPF (ref 0–5)
IMM GRANULOCYTES # BLD AUTO: 0 K/UL (ref 0–0.04)
IMM GRANULOCYTES NFR BLD AUTO: 0 % (ref 0–0.5)
KETONES UR QL STRIP.AUTO: NEGATIVE MG/DL
LEUKOCYTE ESTERASE UR QL STRIP.AUTO: ABNORMAL
LYMPHOCYTES # BLD: 1.7 K/UL (ref 0.8–3.5)
LYMPHOCYTES NFR BLD: 33 % (ref 12–49)
MCH RBC QN AUTO: 39.5 PG (ref 26–34)
MCHC RBC AUTO-ENTMCNC: 34.5 G/DL (ref 30–36.5)
MCV RBC AUTO: 114.5 FL (ref 80–99)
MONOCYTES # BLD: 0.8 K/UL (ref 0–1)
MONOCYTES NFR BLD: 15 % (ref 5–13)
NEUTS SEG # BLD: 2.3 K/UL (ref 1.8–8)
NEUTS SEG NFR BLD: 50 % (ref 32–75)
NITRITE UR QL STRIP.AUTO: NEGATIVE
NRBC # BLD: 0.09 K/UL (ref 0–0.01)
NRBC BLD-RTO: 1.8 PER 100 WBC
PH UR STRIP: 7 [PH] (ref 5–8)
PLATELET # BLD AUTO: 199 K/UL (ref 150–400)
POTASSIUM SERPL-SCNC: 3.7 MMOL/L (ref 3.5–5.1)
PROT SERPL-MCNC: 8.3 G/DL (ref 6.4–8.2)
PROT UR STRIP-MCNC: NEGATIVE MG/DL
RBC # BLD AUTO: 2.28 M/UL (ref 3.8–5.2)
RBC #/AREA URNS HPF: ABNORMAL /HPF (ref 0–5)
RBC MORPH BLD: ABNORMAL
RBC MORPH BLD: ABNORMAL
SODIUM SERPL-SCNC: 138 MMOL/L (ref 136–145)
SP GR UR REFRACTOMETRY: <1.005 (ref 1–1.03)
TSH SERPL DL<=0.05 MIU/L-ACNC: 0.84 UIU/ML (ref 0.36–3.74)
UROBILINOGEN UR QL STRIP.AUTO: 1 EU/DL (ref 0.2–1)
WBC # BLD AUTO: 5 K/UL (ref 3.6–11)
WBC URNS QL MICRO: ABNORMAL /HPF (ref 0–4)

## 2022-01-10 NOTE — PROGRESS NOTES
[address] [facility]  [place] [address]  [address] [address] [facility] [id number]  Phone:  [phone]  Fax: [phone]       Chief Complaint   Patient presents with    Numbness     Patient in office with complaints of burning/numbness in hands and feet.  Other     Patient states that she became \"very\" sick over the Christmas holiday. She states that she was dizzy, experienced a couple of falls and incoherent. .      SUBJECTIVE:    Alicia Payton is a 63 y.o. female comes in stating that approximately the day before Christmas she became \"sick\". She noted vague dizziness and memory lapses. This continued for several days and her family did not notify her. I attempted to notify, but was not successful. She was actually supposed to see her son at his home in [place], but missed the flight. After approximately three to four days she returned back to her baseline. Her brother found her and she remembers seeing him, but does not remember any further details that she could elaborate on. Since then she has been back to her baseline. She denies similar symptoms in the past.  The only medication that she is currently taking above and beyond her antihypertensive medication, as well as statin are her narcotics. She usually has total control of these and does not take them to excess. Current Outpatient Medications   Medication Sig Dispense Refill    hydroxyurea (HYDREA) 500 mg capsule TAKE 1 CAPSULE BY MOUTH TWICE DAILY 60 Capsule 11    meclizine (ANTIVERT) 25 mg tablet Take 1 Tablet by mouth three (3) times daily as needed for Dizziness. 20 Tablet 0    ondansetron (Zofran ODT) 4 mg disintegrating tablet Take 1 Tablet by mouth every eight (8) hours as needed for Nausea. 10 Tablet 0    telmisartan (MICARDIS) 40 mg tablet Take 1 Tablet by mouth nightly. 30 Tablet 11    FLUoxetine (PROzac) 10 mg capsule Take 1 Capsule by mouth daily.  30 Capsule 4    promethazine (PHENERGAN) 25 mg tablet TAKE 1 TABLET BY MOUTH EVERY 8 HOURS AS NEEDED FOR NAUSEA 90 Tablet 11    hydrocortisone (Cortisone) 1 % topical cream Apply  to affected area two (2) times daily as needed for Skin Irritation. use thin layer 30 g 11    folic acid (FOLVITE) 1 mg tablet Take 1 Tab by mouth daily. 30 Tab 11    omeprazole (PRILOSEC OTC) 20 mg tablet Take 20 mg by mouth nightly.        Past Medical History:   Diagnosis Date    Anemia     SC hemoglobinopathy    Chronic pain     Depression     Hypertension     Liver disease     hepatitis C; pt reports \"cured\"    Sickle cell disease ([facility] [address]      Past Surgical History:   Procedure Laterality Date    HX BREAST BIOPSY Right 05/2007    negative    HX CHOLECYSTECTOMY      HX ORTHOPAEDIC      bony curettage of an ostial myelitis focus    UT BREAST SURGERY PROCEDURE UNLISTED      2 cysts removed from R breast    UT CHEST SURGERY PROCEDURE UNLISTED      status post thor for removal of a benign      Allergies   Allergen Reactions    Dilaudid [Hydromorphone (Bulk)] Itching     Can tolerate with benadryl and ondansetron    Percocet [Oxycodone-Acetaminophen] Itching         REVIEW OF SYSTEMS:  General: negative for - chills or fever  ENT: negative for - headaches, nasal congestion or tinnitus  Respiratory: negative for - cough, hemoptysis, shortness of breath or wheezing  Cardiovascular : negative for - chest pain, edema, palpitations or shortness of breath  Gastrointestinal: negative for - abdominal pain, blood in stools, heartburn or nausea/vomiting  Genito-Urinary: no dysuria, trouble voiding, or hematuria  Musculoskeletal: negative for - gait disturbance, joint pain, joint stiffness or joint swelling  Neurological: no TIA or stroke symptoms  Hematologic: no bruises, no bleeding, no swollen glands  Integument: no lumps, mole changes, nail changes or rash  Endocrine: no malaise/lethargy or unexpected weight changes      Social History     Socioeconomic History    Marital status:     Number of children: 2   Occupational History    Occupation: disabled---Sickle cell disease   Tobacco Use    Smoking status: Never Smoker    Smokeless tobacco: Never Used   Vaping Use    Vaping Use: Never used   Substance and Sexual Activity    Alcohol use: No    Drug use: No    Sexual activity: Yes     Partners: Male     Family History   Problem Relation Age of Onset    Hypertension Mother     Hypertension Father        OBJECTIVE:    Visit Vitals  BP (!) 141/89   Pulse 93   Temp 99.3 °F (37.4 °C) (Oral)   Resp 16   Ht 5' 11\" (1.803 m)   Wt 194 lb (88 kg)   SpO2 99%   BMI 27.06 kg/m²     CONSTITUTIONAL: well , well nourished, appears age appropriate  EYES: perrla, eom intact  ENMT:moist mucous membranes, pharynx clear  NECK: supple. Thyroid normal  RESPIRATORY: Chest: clear to ascultation and percussion   CARDIOVASCULAR: Heart: regular rate and rhythm  GASTROINTESTINAL: Abdomen: soft, bowel sounds active  HEMATOLOGIC: no pathological lymph nodes palpated  MUSCULOSKELETAL: Extremities: no edema, pulse 1+   INTEGUMENT: No unusual rashes or suspicious skin lesions noted. Nails appear normal.  NEUROLOGIC: non-focal exam   MENTAL STATUS: alert and oriented, appropriate affect      ASSESSMENT:  1. Encephalopathy    2. Sickle cell disease with crisis ([address]    3. Dyslipidemia    4. Primary hypertension        PLAN:  1. The patient is back to her baseline. I am not entirely sure of the etiology of this episode which was fairly prolonged. Obviously narcotic usage would be a consideration, but she has not had any problems with this previously. She does not appear to have had an acute illness. She feels well now and is back to her baseline. I did not find any obvious pathology. 2. She will continue her analgesic for her disease. 3. She will continue her statin in view of her increased cardiovascular risk. She is in a primary risk prevention mode.   4. Blood pressure is excellent, no adjustments are made.    .  Orders Placed This Encounter    CBC WITH AUTOMATED DIFF    METABOLIC PANEL, COMPREHENSIVE    TSH 3RD GENERATION               Lincoln Flores MD

## 2022-01-11 ENCOUNTER — TELEPHONE (OUTPATIENT)
Dept: INTERNAL MEDICINE CLINIC | Age: 62
End: 2022-01-11

## 2022-01-19 ENCOUNTER — OFFICE VISIT (OUTPATIENT)
Dept: INTERNAL MEDICINE CLINIC | Age: 62
End: 2022-01-19
Payer: MEDICARE

## 2022-01-19 VITALS
HEIGHT: 71 IN | HEART RATE: 108 BPM | OXYGEN SATURATION: 95 % | SYSTOLIC BLOOD PRESSURE: 142 MMHG | TEMPERATURE: 98.7 F | DIASTOLIC BLOOD PRESSURE: 73 MMHG | RESPIRATION RATE: 18 BRPM | BODY MASS INDEX: 26.84 KG/M2 | WEIGHT: 191.7 LBS

## 2022-01-19 DIAGNOSIS — F32.9 REACTIVE DEPRESSION: ICD-10-CM

## 2022-01-19 DIAGNOSIS — G93.40 ENCEPHALOPATHY ACUTE: Primary | ICD-10-CM

## 2022-01-19 DIAGNOSIS — D57.00 SICKLE CELL DISEASE WITH CRISIS (HCC): ICD-10-CM

## 2022-01-19 DIAGNOSIS — I10 PRIMARY HYPERTENSION: ICD-10-CM

## 2022-01-19 PROCEDURE — G9717 DOC PT DX DEP/BP F/U NT REQ: HCPCS | Performed by: INTERNAL MEDICINE

## 2022-01-19 PROCEDURE — 3017F COLORECTAL CA SCREEN DOC REV: CPT | Performed by: INTERNAL MEDICINE

## 2022-01-19 PROCEDURE — G8754 DIAS BP LESS 90: HCPCS | Performed by: INTERNAL MEDICINE

## 2022-01-19 PROCEDURE — G8427 DOCREV CUR MEDS BY ELIG CLIN: HCPCS | Performed by: INTERNAL MEDICINE

## 2022-01-19 PROCEDURE — G8419 CALC BMI OUT NRM PARAM NOF/U: HCPCS | Performed by: INTERNAL MEDICINE

## 2022-01-19 PROCEDURE — G8753 SYS BP > OR = 140: HCPCS | Performed by: INTERNAL MEDICINE

## 2022-01-19 PROCEDURE — 99214 OFFICE O/P EST MOD 30 MIN: CPT | Performed by: INTERNAL MEDICINE

## 2022-01-19 NOTE — PROGRESS NOTES
Chief Complaint   Patient presents with    Dizziness     2 week follow up          1. Have you been to the ER, urgent care clinic since your last visit? Hospitalized since your last visit? No    2. Have you seen or consulted any other health care providers outside of the [address] [place] [address] since your last visit? Include any pap smears or colon screening.  No

## 2022-01-23 PROBLEM — G93.40 ENCEPHALOPATHY ACUTE: Status: ACTIVE | Noted: 2022-01-23

## 2022-01-23 NOTE — PROGRESS NOTES
[address] [facility]  [place] [address]  [address] [facility] [address]  Phone:  [phone]  Fax: [phone]       Chief Complaint   Patient presents with    Dizziness     2 week follow up    . SUBJECTIVE:    Halima Shirley is a 63 y.o. female comes in for return visit stating that she has felt well since I last saw her. She had an episode lasting about six days where she could not remember any details of the events. No relatives were at her bedside at the time. She could not remember whether or not she was eating or drinking either. She is supposed to have gone to her son's home, but missed the flight. In any event, she feels back to herself currently. She apparently had no difficulty walking although she was relatively weak to the point where she fell on several occasions. Current Outpatient Medications   Medication Sig Dispense Refill    hydroxyurea (HYDREA) 500 mg capsule TAKE 1 CAPSULE BY MOUTH TWICE DAILY 60 Capsule 11    meclizine (ANTIVERT) 25 mg tablet Take 1 Tablet by mouth three (3) times daily as needed for Dizziness. 20 Tablet 0    ondansetron (Zofran ODT) 4 mg disintegrating tablet Take 1 Tablet by mouth every eight (8) hours as needed for Nausea. 10 Tablet 0    telmisartan (MICARDIS) 40 mg tablet Take 1 Tablet by mouth nightly. 30 Tablet 11    FLUoxetine (PROzac) 10 mg capsule Take 1 Capsule by mouth daily. 30 Capsule 4    promethazine (PHENERGAN) 25 mg tablet TAKE 1 TABLET BY MOUTH EVERY 8 HOURS AS NEEDED FOR NAUSEA 90 Tablet 11    hydrocortisone (Cortisone) 1 % topical cream Apply  to affected area two (2) times daily as needed for Skin Irritation. use thin layer 30 g 11    folic acid (FOLVITE) 1 mg tablet Take 1 Tab by mouth daily. 30 Tab 11    omeprazole (PRILOSEC OTC) 20 mg tablet Take 20 mg by mouth nightly.        Past Medical History:   Diagnosis Date    Anemia     SC hemoglobinopathy    Chronic pain     Depression     Hypertension  Liver disease     hepatitis C; pt reports \"cured\"    Sickle cell disease ([facility] [address]      Past Surgical History:   Procedure Laterality Date    HX BREAST BIOPSY Right 05/2007    negative    HX CHOLECYSTECTOMY      HX ORTHOPAEDIC      bony curettage of an ostial myelitis focus    TN BREAST SURGERY PROCEDURE UNLISTED      2 cysts removed from R breast    TN CHEST SURGERY PROCEDURE UNLISTED      status post thor for removal of a benign      Allergies   Allergen Reactions    Dilaudid [Hydromorphone (Bulk)] Itching     Can tolerate with benadryl and ondansetron    Percocet [Oxycodone-Acetaminophen] Itching         REVIEW OF SYSTEMS:  General: negative for - chills or fever  ENT: negative for - headaches, nasal congestion or tinnitus  Respiratory: negative for - cough, hemoptysis, shortness of breath or wheezing  Cardiovascular : negative for - chest pain, edema, palpitations or shortness of breath  Gastrointestinal: negative for - abdominal pain, blood in stools, heartburn or nausea/vomiting  Genito-Urinary: no dysuria, trouble voiding, or hematuria  Musculoskeletal: negative for - gait disturbance, joint pain, joint stiffness or joint swelling  Neurological: no TIA or stroke symptoms  Hematologic: no bruises, no bleeding, no swollen glands  Integument: no lumps, mole changes, nail changes or rash  Endocrine: no malaise/lethargy or unexpected weight changes      Social History     Socioeconomic History    Marital status:     Number of children: 2   Occupational History    Occupation: disabled---Sickle cell disease   Tobacco Use    Smoking status: Never Smoker    Smokeless tobacco: Never Used   Vaping Use    Vaping Use: Never used   Substance and Sexual Activity    Alcohol use: No    Drug use: No    Sexual activity: Yes     Partners: Male     Family History   Problem Relation Age of Onset    Hypertension Mother     Hypertension Father        OBJECTIVE:    Visit Vitals  BP (!) 142/73   Pulse (!) 108   Temp 98.7 °F (37.1 °C) (Oral)   Resp 18   Ht 5' 11\" (1.803 m)   Wt 191 lb 11.2 oz (87 kg)   SpO2 95%   BMI 26.74 kg/m²     CONSTITUTIONAL: well , well nourished, appears age appropriate  EYES: perrla, eom intact  ENMT:moist mucous membranes, pharynx clear  NECK: supple. Thyroid normal  RESPIRATORY: Chest: clear to ascultation and percussion   CARDIOVASCULAR: Heart: regular rate and rhythm  GASTROINTESTINAL: Abdomen: soft, bowel sounds active  HEMATOLOGIC: no pathological lymph nodes palpated  MUSCULOSKELETAL: Extremities: no edema, pulse 1+   INTEGUMENT: No unusual rashes or suspicious skin lesions noted. Nails appear normal.  NEUROLOGIC: non-focal exam   MENTAL STATUS: alert and oriented, appropriate affect      ASSESSMENT:  1. Encephalopathy acute    2. Sickle cell disease with crisis ([facility] [address]    3. Primary hypertension    4. Reactive depression        PLAN:  1. The encephalopathic episode remains quite elusive. MRI of the brain will be obtained to make sure she does not have a CVA. By the time I saw her she had no focal signs at that particular time. She also denies any similar symptoms in the past.  2. Her sickle cell disease is reasonably stable. She does take analgesics which she states she had not taken of any significance during that time. 3. Blood pressure has been excellent. She remains on her antihypertensive medication. 4. Her depression has also been quite stable. I did not feel at any point in time that there was any type of psychogenic component to this. Follow-up and Dispositions    · Return in about 4 weeks (around 2/16/2022).            Iron Gonzales MD

## 2022-01-25 DIAGNOSIS — D57.00 SICKLE CELL CRISIS (HCC): Primary | ICD-10-CM

## 2022-01-25 RX ORDER — HYDROCODONE BITARTRATE AND ACETAMINOPHEN 10; 325 MG/1; MG/1
1 TABLET ORAL
Qty: 120 TABLET | Refills: 0 | Status: SHIPPED | OUTPATIENT
Start: 2022-01-25 | End: 2022-02-24

## 2022-01-26 ENCOUNTER — TELEPHONE (OUTPATIENT)
Dept: INTERNAL MEDICINE CLINIC | Age: 62
End: 2022-01-26

## 2022-01-26 NOTE — TELEPHONE ENCOUNTER
Patient notified by phone and told that Dr. Abel Ardon wants her to see a hematologist for elevated protein

## 2022-02-11 RX ORDER — FLUOXETINE 10 MG/1
10 CAPSULE ORAL DAILY
Qty: 30 CAPSULE | Refills: 11 | Status: SHIPPED | OUTPATIENT
Start: 2022-02-11

## 2022-02-11 RX ORDER — FLUOXETINE 10 MG/1
10 CAPSULE ORAL DAILY
Qty: 30 CAPSULE | Refills: 4 | OUTPATIENT
Start: 2022-02-11

## 2022-02-22 ENCOUNTER — HOSPITAL ENCOUNTER (OUTPATIENT)
Dept: MRI IMAGING | Age: 62
Discharge: HOME | End: 2022-02-22
Attending: INTERNAL MEDICINE
Payer: MEDICARE

## 2022-02-22 ENCOUNTER — HOSPITAL ENCOUNTER (OUTPATIENT)
Dept: NON INVASIVE DIAGNOSTICS | Age: 62
Discharge: HOME | End: 2022-02-22
Attending: INTERNAL MEDICINE
Payer: MEDICARE

## 2022-02-22 DIAGNOSIS — G93.40 ENCEPHALOPATHY ACUTE: ICD-10-CM

## 2022-02-22 LAB
ATRIAL RATE: 102 BPM
CALCULATED P AXIS, ECG09: 68 DEGREES
CALCULATED R AXIS, ECG10: 45 DEGREES
CALCULATED T AXIS, ECG11: 56 DEGREES
DIAGNOSIS, 93000: NORMAL
P-R INTERVAL, ECG05: 144 MS
Q-T INTERVAL, ECG07: 384 MS
QRS DURATION, ECG06: 80 MS
QTC CALCULATION (BEZET), ECG08: 497 MS
VENTRICULAR RATE, ECG03: 101 BPM

## 2022-02-22 PROCEDURE — 93005 ELECTROCARDIOGRAM TRACING: CPT

## 2022-02-22 PROCEDURE — 70551 MRI BRAIN STEM W/O DYE: CPT

## 2022-02-22 NOTE — PROGRESS NOTES
General Surgery End of Shift Nursing Note Bedside shift change report given to  (oncoming nurse) by Grazyna (offgoing nurse). Report included the following information SBAR, Kardex and MAR. Shift worked:   7p-7a Summary of shift:    Pt was treated with PRN  meds regularly during shift. Issues for physician to address:   N/A Number times ambulated in hallway past shift: 1 Number of times OOB to chair past shift: 2 Pain Management: 
Current medication: Dilaudid Patient states pain is manageable on current pain medication: YES 
 
GI: 
 
Current diet:  DIET REGULAR Tolerating current diet: YES Passing flatus: YES Last Bowel Movement: several days ago Appearance:  
 
Respiratory: 
 
Incentive Spirometer at bedside: YES Patient instructed on use: YES Patient Safety: 
 
Falls Score: 1 Bed Alarm On? No 
Sitter? No 
 
Mercedes Mike "I am having my right hip replaced"

## 2022-02-23 ENCOUNTER — OFFICE VISIT (OUTPATIENT)
Dept: INTERNAL MEDICINE CLINIC | Age: 62
End: 2022-02-23
Payer: MEDICARE

## 2022-02-23 VITALS
OXYGEN SATURATION: 98 % | HEIGHT: 71 IN | DIASTOLIC BLOOD PRESSURE: 79 MMHG | SYSTOLIC BLOOD PRESSURE: 131 MMHG | RESPIRATION RATE: 16 BRPM | BODY MASS INDEX: 26.92 KG/M2 | TEMPERATURE: 99.1 F | HEART RATE: 104 BPM | WEIGHT: 192.3 LBS

## 2022-02-23 DIAGNOSIS — D57.00 SICKLE CELL DISEASE WITH CRISIS (HCC): ICD-10-CM

## 2022-02-23 DIAGNOSIS — F32.9 REACTIVE DEPRESSION: ICD-10-CM

## 2022-02-23 DIAGNOSIS — I10 PRIMARY HYPERTENSION: ICD-10-CM

## 2022-02-23 DIAGNOSIS — R41.82 ALTERED MENTAL STATUS, UNSPECIFIED ALTERED MENTAL STATUS TYPE: Primary | ICD-10-CM

## 2022-02-23 DIAGNOSIS — D89.2 PARAPROTEINEMIA: ICD-10-CM

## 2022-02-23 PROCEDURE — G9717 DOC PT DX DEP/BP F/U NT REQ: HCPCS | Performed by: INTERNAL MEDICINE

## 2022-02-23 PROCEDURE — 99214 OFFICE O/P EST MOD 30 MIN: CPT | Performed by: INTERNAL MEDICINE

## 2022-02-23 PROCEDURE — G8754 DIAS BP LESS 90: HCPCS | Performed by: INTERNAL MEDICINE

## 2022-02-23 PROCEDURE — G8427 DOCREV CUR MEDS BY ELIG CLIN: HCPCS | Performed by: INTERNAL MEDICINE

## 2022-02-23 PROCEDURE — G8419 CALC BMI OUT NRM PARAM NOF/U: HCPCS | Performed by: INTERNAL MEDICINE

## 2022-02-23 PROCEDURE — G8752 SYS BP LESS 140: HCPCS | Performed by: INTERNAL MEDICINE

## 2022-02-23 PROCEDURE — 3017F COLORECTAL CA SCREEN DOC REV: CPT | Performed by: INTERNAL MEDICINE

## 2022-02-23 NOTE — PROGRESS NOTES
Chief Complaint   Patient presents with    Dizziness     follow up          1. Have you been to the ER, urgent care clinic since your last visit? Hospitalized since your last visit? No    2. Have you seen or consulted any other health care providers outside of the [address] [place] [address] since your last visit? Include any pap smears or colon screening.  No

## 2022-02-24 NOTE — PROGRESS NOTES
[address] [place] [address] and Primary Care  [place] [address]  [address] [place] [address]  Phone:  [phone]  Fax: [phone]       Chief Complaint   Patient presents with    Dizziness     follow up    . SUBJECTIVE:    Albino Bridges is a 63 y.o. female The patient comes in for return visit stating that she had another similar episode that she had previously. She literally went to sleep on Friday and was very sketchy about the weekend and being awakened on Monday by loud knocks at her door. She could not remember any details. There was no bleeding from her mouth. She had no urinary incontinence either. Previous workup has been entirely negative. She also has a mild degree of paraproteinemia with no M spike. Kappa and lambda values are a bit elevated, although the ratio is less than 1.65. She has a past history of primary hypertension and sickle cell disease and depression. Currently, she does not appear to be depressed. Current Outpatient Medications   Medication Sig Dispense Refill    FLUoxetine (PROzac) 10 mg capsule TAKE 1 CAPSULE BY MOUTH DAILY 30 Capsule 11    HYDROcodone-acetaminophen (NORCO)  mg tablet Take 1 Tablet by mouth every six (6) hours as needed for Pain for up to 30 days. Max Daily Amount: 4 Tablets. [id number] 120 Tablet 0    hydroxyurea (HYDREA) 500 mg capsule TAKE 1 CAPSULE BY MOUTH TWICE DAILY 60 Capsule 11    meclizine (ANTIVERT) 25 mg tablet Take 1 Tablet by mouth three (3) times daily as needed for Dizziness. 20 Tablet 0    ondansetron (Zofran ODT) 4 mg disintegrating tablet Take 1 Tablet by mouth every eight (8) hours as needed for Nausea. 10 Tablet 0    telmisartan (MICARDIS) 40 mg tablet Take 1 Tablet by mouth nightly.  30 Tablet 11    promethazine (PHENERGAN) 25 mg tablet TAKE 1 TABLET BY MOUTH EVERY 8 HOURS AS NEEDED FOR NAUSEA 90 Tablet 11    hydrocortisone (Cortisone) 1 % topical cream Apply  to affected area two (2) times daily as needed for Skin Irritation. use thin layer 30 g 11    folic acid (FOLVITE) 1 mg tablet Take 1 Tab by mouth daily. 30 Tab 11    omeprazole (PRILOSEC OTC) 20 mg tablet Take 20 mg by mouth nightly.        Past Medical History:   Diagnosis Date    Anemia     SC hemoglobinopathy    Chronic pain     Depression     Hypertension     Liver disease     hepatitis C; pt reports \"cured\"    Sickle cell disease ([address]      Past Surgical History:   Procedure Laterality Date    HX BREAST BIOPSY Right 05/2007    negative    HX CHOLECYSTECTOMY      HX ORTHOPAEDIC      bony curettage of an ostial myelitis focus    HI BREAST SURGERY PROCEDURE UNLISTED      2 cysts removed from R breast    HI CHEST SURGERY PROCEDURE UNLISTED      status post thor for removal of a benign      Allergies   Allergen Reactions    Dilaudid [Hydromorphone (Bulk)] Itching     Can tolerate with benadryl and ondansetron    Percocet [Oxycodone-Acetaminophen] Itching         REVIEW OF SYSTEMS:  General: negative for - chills or fever  ENT: negative for - headaches, nasal congestion or tinnitus  Respiratory: negative for - cough, hemoptysis, shortness of breath or wheezing  Cardiovascular : negative for - chest pain, edema, palpitations or shortness of breath  Gastrointestinal: negative for - abdominal pain, blood in stools, heartburn or nausea/vomiting  Genito-Urinary: no dysuria, trouble voiding, or hematuria  Musculoskeletal: negative for - gait disturbance, joint pain, joint stiffness or joint swelling  Neurological: no TIA or stroke symptoms  Hematologic: no bruises, no bleeding, no swollen glands  Integument: no lumps, mole changes, nail changes or rash  Endocrine: no malaise/lethargy or unexpected weight changes      Social History     Socioeconomic History    Marital status:     Number of children: 2   Occupational History    Occupation: disabled---Sickle cell disease   Tobacco Use    Smoking status: Never Smoker    Smokeless tobacco: Never Used   Vaping Use    Vaping Use: Never used   Substance and Sexual Activity    Alcohol use: No    Drug use: No    Sexual activity: Yes     Partners: Male     Family History   Problem Relation Age of Onset    Hypertension Mother     Hypertension Father        OBJECTIVE:    Visit Vitals  /79   Pulse (!) 104   Temp 99.1 °F (37.3 °C) (Oral)   Resp 16   Ht 5' 11\" (1.803 m)   Wt 192 lb 4.8 oz (87.2 kg)   SpO2 98%   BMI 26.82 kg/m²     CONSTITUTIONAL: well , well nourished, appears age appropriate  EYES: perrla, eom intact  ENMT:moist mucous membranes, pharynx clear  NECK: supple. Thyroid normal  RESPIRATORY: Chest: clear to ascultation and percussion   CARDIOVASCULAR: Heart: regular rate and rhythm  GASTROINTESTINAL: Abdomen: soft, bowel sounds active  HEMATOLOGIC: no pathological lymph nodes palpated  MUSCULOSKELETAL: Extremities: no edema, pulse 1+   INTEGUMENT: No unusual rashes or suspicious skin lesions noted. Nails appear normal.  NEUROLOGIC: non-focal exam   MENTAL STATUS: alert and oriented, appropriate affect      ASSESSMENT:  1. Altered mental status, unspecified altered mental status type    2. Paraproteinemia    3. Sickle cell disease with crisis ([facility] [address]    4. Primary hypertension    5. Reactive depression        PLAN:  1. The patient has had altered mental status for an extended period of time, although not as long as the initial episode that occurred a month ago. The workup to date has been negative including a negative MRI scan most recently done. I am in a loss to explain this, but further workup is definitely needed. For this reason, I will send her to a neurologist.  2. She does have a paraproteinemia with a negative M spike. Her kappa and lambda chains are also elevated. She will be seen by hematologist.  3. Her sickle cell disease is reasonably stable. 4. Blood pressure is excellent. No adjustments are made. 5. Her depression also is stable.   I do not think the altered mental status has anything to do with her depression currently. Follow-up and Dispositions    · Return in about 3 months (around 5/23/2022).            Jace Oliveros MD

## 2022-02-25 ENCOUNTER — TELEPHONE (OUTPATIENT)
Dept: NEUROLOGY | Age: 62
End: 2022-02-25

## 2022-03-04 DIAGNOSIS — D57.00 SICKLE CELL DISEASE WITH CRISIS (HCC): Primary | ICD-10-CM

## 2022-03-04 RX ORDER — HYDROCODONE BITARTRATE AND ACETAMINOPHEN 10; 325 MG/1; MG/1
1 TABLET ORAL
Qty: 120 TABLET | Refills: 0 | Status: SHIPPED | OUTPATIENT
Start: 2022-03-04 | End: 2022-04-03

## 2022-03-04 RX ORDER — FENTANYL 75 UG/H
1 PATCH TRANSDERMAL
Qty: 10 PATCH | Refills: 0 | Status: SHIPPED | OUTPATIENT
Start: 2022-03-04 | End: 2022-04-03

## 2022-03-18 PROBLEM — D57.00 SICKLE CELL PAIN CRISIS (HCC): Status: ACTIVE | Noted: 2018-09-08

## 2022-03-18 PROBLEM — E66.09 CLASS 1 OBESITY DUE TO EXCESS CALORIES WITH SERIOUS COMORBIDITY AND BODY MASS INDEX (BMI) OF 30.0 TO 30.9 IN ADULT: Status: ACTIVE | Noted: 2019-08-13

## 2022-03-18 PROBLEM — K29.70 VIRAL GASTRITIS: Status: ACTIVE | Noted: 2020-07-06

## 2022-03-18 PROBLEM — G93.40 ENCEPHALOPATHY ACUTE: Status: ACTIVE | Noted: 2022-01-23

## 2022-03-18 PROBLEM — M87.059 AVN OF FEMUR (HCC): Status: ACTIVE | Noted: 2020-01-25

## 2022-03-18 PROBLEM — E66.811 CLASS 1 OBESITY DUE TO EXCESS CALORIES WITH SERIOUS COMORBIDITY AND BODY MASS INDEX (BMI) OF 30.0 TO 30.9 IN ADULT: Status: ACTIVE | Noted: 2019-08-13

## 2022-03-19 PROBLEM — H93.A9 PULSATILE TINNITUS: Status: ACTIVE | Noted: 2017-10-31

## 2022-03-19 PROBLEM — D57.1 SICKLE CELL DISEASE (HCC): Status: ACTIVE | Noted: 2019-02-10

## 2022-03-19 PROBLEM — G56.01 CARPAL TUNNEL SYNDROME OF RIGHT WRIST: Status: ACTIVE | Noted: 2018-06-12

## 2022-03-20 PROBLEM — D57.00 SICKLE CELL CRISIS (HCC): Status: ACTIVE | Noted: 2017-05-22

## 2022-03-20 PROBLEM — E66.3 OVERWEIGHT: Status: ACTIVE | Noted: 2019-02-07

## 2022-04-03 RX ORDER — FOLIC ACID 1 MG/1
TABLET ORAL
Qty: 30 TABLET | Refills: 11 | Status: SHIPPED | OUTPATIENT
Start: 2022-04-03

## 2022-04-16 DIAGNOSIS — D57.00 SICKLE CELL PAIN CRISIS (HCC): Primary | ICD-10-CM

## 2022-04-16 RX ORDER — HYDROCODONE BITARTRATE AND ACETAMINOPHEN 10; 325 MG/1; MG/1
1 TABLET ORAL
Qty: 120 TABLET | Refills: 0 | Status: SHIPPED | OUTPATIENT
Start: 2022-04-16 | End: 2022-05-16

## 2022-05-03 ENCOUNTER — OFFICE VISIT (OUTPATIENT)
Dept: NEUROLOGY | Age: 62
End: 2022-05-03
Payer: MEDICARE

## 2022-05-03 VITALS
RESPIRATION RATE: 18 BRPM | WEIGHT: 192 LBS | BODY MASS INDEX: 26.78 KG/M2 | DIASTOLIC BLOOD PRESSURE: 84 MMHG | SYSTOLIC BLOOD PRESSURE: 134 MMHG | TEMPERATURE: 99.4 F | HEART RATE: 92 BPM | OXYGEN SATURATION: 96 %

## 2022-05-03 DIAGNOSIS — R41.89 COGNITIVE IMPAIRMENT: ICD-10-CM

## 2022-05-03 DIAGNOSIS — R40.4 ALTERED AWARENESS, TRANSIENT: Primary | ICD-10-CM

## 2022-05-03 PROCEDURE — 3017F COLORECTAL CA SCREEN DOC REV: CPT | Performed by: PSYCHIATRY & NEUROLOGY

## 2022-05-03 PROCEDURE — G8754 DIAS BP LESS 90: HCPCS | Performed by: PSYCHIATRY & NEUROLOGY

## 2022-05-03 PROCEDURE — G8419 CALC BMI OUT NRM PARAM NOF/U: HCPCS | Performed by: PSYCHIATRY & NEUROLOGY

## 2022-05-03 PROCEDURE — G8752 SYS BP LESS 140: HCPCS | Performed by: PSYCHIATRY & NEUROLOGY

## 2022-05-03 PROCEDURE — G8427 DOCREV CUR MEDS BY ELIG CLIN: HCPCS | Performed by: PSYCHIATRY & NEUROLOGY

## 2022-05-03 PROCEDURE — 99205 OFFICE O/P NEW HI 60 MIN: CPT | Performed by: PSYCHIATRY & NEUROLOGY

## 2022-05-03 PROCEDURE — G9717 DOC PT DX DEP/BP F/U NT REQ: HCPCS | Performed by: PSYCHIATRY & NEUROLOGY

## 2022-05-03 NOTE — PROGRESS NOTES
NEUROLOGY  NEW PATIENT EVALUATION/CONSULTATION       PATIENT NAME: Jenna Morris    MRN: [id number]    REASON FOR CONSULTATION: AMS    05/03/22      Previous records (physician notes, laboratory reports, and radiology reports) and imaging studies were reviewed and summarized. My recommendations will be communicated back to the patient's physician(s) via electronic medical record and/or by [address] mail. HISTORY OF PRESENT ILLNESS:  Jenna Morris is a 63 y.o.  female presenting for evaluation of episodes of AMS since 12/2021. She reports a week prior to [place], she developed N/V with GI upset 12/15. From 12/16-21 she does not recall daily events apart from talking to her mother on 12/16. She regained awareness 12/21 when family members were attempting to call her and knocked on her door after she missed a flight to visit her [place] in [place]. She does recall being able to get up to go to the restroom but felt very dizzy resulting in a fall x 3 over this same time period. She felt better 12/22. She was concerned about potential dehydration from excessive vomiting prior to above events. She denied incontinence or tongue biting. She did well until 2/2022. She had a subsequent episode lasting approximately 3 days. She doesn't recall events in specific detail. Denies eating/drinking during this time period. She does report feeling a generalized aching prior to above episode which she attributed to her sickle cell disease. Denies using narcotics for he sickle cell related pain. No h/o seizures, TBI, CNS infection.       Memory: Admits to recall deficits, short term  Language: Occasional word finding difficulty  Change in personality: None  Physical changes: RUE numbness, intermittent when awakening after lying on the right side  Depressive symptoms: Yes, worse during above episodes but better presently  Hallucinations/Delusions: Denies    Ability to function:  Driving: Yes, no issues  Finances: Handles independently, no current issues  Cooking: Yes, no issues  Manages own medication: Yes, no issues  Residing: Alone    Prior work-up: MRI brain 2/22/22 without acute pathology    Prior treatments: None      PAST MEDICAL HISTORY:  Past Medical History:   Diagnosis Date    Anemia     SC hemoglobinopathy    Chronic pain     Depression     Hypertension     Liver disease     hepatitis C; pt reports \"cured\"    Sickle cell disease ([facility] [address]        PAST SURGICAL HISTORY:  Past Surgical History:   Procedure Laterality Date    HX BREAST BIOPSY Right 05/2007    negative    HX CHOLECYSTECTOMY      HX ORTHOPAEDIC      bony curettage of an ostial myelitis focus    [place] BREAST SURGERY PROCEDURE UNLISTED      2 cysts removed from R breast    AL CHEST SURGERY PROCEDURE UNLISTED      status post thor for removal of a benign        FAMILY HISTORY:   Family History   Problem Relation Age of Onset    Hypertension Mother     Hypertension Father    Maternal GM dementia  Father-dementia      SOCIAL HISTORY:  Social History     Socioeconomic History    Marital status:     Number of children: 2   Occupational History    Occupation: disabled---Sickle cell disease   Tobacco Use    Smoking status: Never Smoker    Smokeless tobacco: Never Used   Vaping Use    Vaping Use: Never used   Substance and Sexual Activity    Alcohol use: No    Drug use: No    Sexual activity: Yes     Partners: Male         MEDICATIONS:   Current Outpatient Medications   Medication Sig Dispense Refill    HYDROcodone-acetaminophen (NORCO)  mg tablet Take 1 Tablet by mouth every six (6) hours as needed for Pain for up to 30 days. Max Daily Amount: 4 Tablets.  Dx.d57.00 399 Tablet 0    folic acid (FOLVITE) 1 mg tablet TAKE 1 TABLET BY MOUTH DAILY 30 Tablet 11    FLUoxetine (PROzac) 10 mg capsule TAKE 1 CAPSULE BY MOUTH DAILY 30 Capsule 11    hydroxyurea (HYDREA) 500 mg capsule TAKE 1 CAPSULE BY MOUTH TWICE DAILY 60 Capsule 11    meclizine (ANTIVERT) 25 mg tablet Take 1 Tablet by mouth three (3) times daily as needed for Dizziness. 20 Tablet 0    ondansetron (Zofran ODT) 4 mg disintegrating tablet Take 1 Tablet by mouth every eight (8) hours as needed for Nausea. 10 Tablet 0    telmisartan (MICARDIS) 40 mg tablet Take 1 Tablet by mouth nightly. 30 Tablet 11    promethazine (PHENERGAN) 25 mg tablet TAKE 1 TABLET BY MOUTH EVERY 8 HOURS AS NEEDED FOR NAUSEA 90 Tablet 11    hydrocortisone (Cortisone) 1 % topical cream Apply  to affected area two (2) times daily as needed for Skin Irritation. use thin layer 30 g 11    omeprazole (PRILOSEC OTC) 20 mg tablet Take 20 mg by mouth nightly. ALLERGIES:  Allergies   Allergen Reactions    Dilaudid [Hydromorphone (Bulk)] Itching     Can tolerate with benadryl and ondansetron    Percocet [Oxycodone-Acetaminophen] Itching         REVIEW OF SYSTEMS:  10 point ROS reviewed with patient. Please see scanned document under media. PHYSICAL EXAM:  Vital Signs:   Visit Vitals  /84   Pulse 92   Temp 99.4 °F (37.4 °C)   Resp 18   Wt 192 lb (87.1 kg)   SpO2 96%   BMI 26.78 kg/m²        General Medical Exam:  General:  Well appearing, comfortable, in no apparent distress. Eyes/ENT: see cranial nerve examination. Neck: No masses appreciated. Full range of motion without tenderness. Respiratory:  Clear to auscultation, good air entry bilaterally. Cardiac:  Regular rate and rhythm, no murmur. GI:  Soft, non-tender, non-distended abdomen. Bowel sounds normal. No masses, organomegaly. Extremities:  No deformities, edema, or skin discoloration. Skin:  No rashes or lesions. Neurological:  · Mental Status:  Alert and oriented to person, place, and time with fluent speech. · MOCA: 19/30 (see scanned media)  · Cranial Nerves:   CNII/III/IV/VI: visual fields full to confrontation, EOMI, PERRL, no ptosis or nystagmus.    CN V: Facial sensation intact bilaterally, masseter 5/5   CN VII: Facial muscles symmetric and strong   CN VIII: Hears finger rub well bilaterally, intact vestibular function   CN IX/X: Normal palatal movement   CN XI: Full strength shoulder shrug bilaterally   CN XII: Tongue protrusion full and midline without fasciculation or atrophy  · Motor: Normal tone and muscle bulk with no pronator drift. No atrophy or fasciculations present on examination. Individual muscle group testing:  Shoulder abduction:   Left:5/5   Right : 5/5    Shoulder adduction:   Left:5/5   Right : 5/5    Elbow flexion:      Left:5/5   Right : 5/5  Elbow extension:    Left:5/5   Right : 5/5   Wrist flexion:    Left:5/5   Right : 5/5  Wrist extension:    Left:5/5   Right : 5/5  Arm pronation:   Left:5/5   Right : 5/5  Arm supination:   Left:5/5   Right : 5/5    Finger flexion:    Left:5/5   Right : 5/5    Finger extension:   Left:5/5   Right : 5/5   Finger abduction:  Left:5/5   Right : 5/5   Finger adduction:   Left:5/5   Right : 5/5  Hip flexion:     Left:5/5   Right : 5/5         Hip extension:   Left:5/5   Right : 5/5    Knee flexion:    Left:5/5   Right : 5/5    Knee extension:   Left:5/5   Right : 5/5    Dorsiflexion:     Left:5/5   Right : 5/5  Plantar flexion:    Left:5/5   Right : 5/5      · MSRs: No crossed adductors or clonus. RIGHT  LEFT   Brachioradialis 2+ 2+   Biceps 2+ 2+   Triceps 2+ 2+   Knee 2+ 2+   Achilles 2+ 2+        Plantar response Downward Downward          · Sensation: Normal and symmetric perception of pinprick, temperature, light touch, proprioception, and vibration; (-) Romberg. · Coordination: No dysmetria. Normal rapid alternating movements; finger-to-nose and heel-to- shin testing are within normal limits. · Gait: Normal native gait    PERTINENT DATA:  INTERNAL RECORDS:  The patient's electronic medical record was reviewed.   The relevant details include:  Component      Latest Ref Rng & Units 1/5/2022 1/5/2022           5:13 PM  5:13 PM   WBC      3.6 - 11.0 K/uL  5.0   RBC 3.80 - 5.20 M/uL  2.28 (L)   HGB      11.5 - 16.0 g/dL  9.0 (L)   HCT      35.0 - 47.0 %  26.1 (L)   MCV      80.0 - 99.0 FL  114.5 (H)   MCH      26.0 - 34.0 PG  39.5 (H)   MCHC      30.0 - 36.5 g/dL  34.5   RDW      11.5 - 14.5 %  14.7 (H)   PLATELET      311 - 420 K/uL  199   NRBC      0  WBC  1.8 (H)   ABSOLUTE NRBC      0.00 - 0.01 K/uL  0.09 (H)   NEUTROPHILS      32 - 75 %  50   LYMPHOCYTES      12 - 49 %  33   MONOCYTES      5 - 13 %  15 (H)   EOSINOPHILS      0 - 7 %  1   BASOPHILS      0 - 1 %  1   IMMATURE GRANULOCYTES      0.0 - 0.5 %  0   ABS. NEUTROPHILS      1.8 - 8.0 K/UL  2.3   ABS. LYMPHOCYTES      0.8 - 3.5 K/UL  1.7   ABS. MONOCYTES      0.0 - 1.0 K/UL  0.8   ABS. EOSINOPHILS      0.0 - 0.4 K/UL  0.1   ABS. BASOPHILS      0.0 - 0.1 K/UL  0.1   ABS. IMM. GRANS.      0.00 - 0.04 K/UL  0.0   DF        SMEAR SCANNED   RBC COMMENTS       TARGET CELLS . . . MACROCYTOSIS . . .   Sodium      136 - 145 mmol/L     Potassium      3.5 - 5.1 mmol/L     Chloride      97 - 108 mmol/L     CO2      21 - 32 mmol/L     Anion gap      5 - 15 mmol/L     Glucose      65 - 100 mg/dL     BUN      6 - 20 MG/DL     Creatinine      0.55 - 1.02 MG/DL     BUN/Creatinine ratio      12 - 20       GFR est AA      >60 ml/min/1.73m2     GFR est non-AA      >60 ml/min/1.73m2     Calcium      8.5 - 10.1 MG/DL     Bilirubin, total      0.2 - 1.0 MG/DL     ALT      12 - 78 U/L     AST      15 - 37 U/L     Alk.  phosphatase      45 - 117 U/L     Protein, total      6.4 - 8.2 g/dL     Albumin      3.5 - 5.0 g/dL     Globulin      2.0 - 4.0 g/dL     A-G Ratio      1.1 - 2.2       TSH      0.36 - 3.74 uIU/mL       Component      Latest Ref Rng & Units 1/5/2022 1/5/2022           5:13 PM  5:13 PM   WBC      3.6 - 11.0 K/uL     RBC      3.80 - 5.20 M/uL     HGB      11.5 - 16.0 g/dL     HCT      35.0 - 47.0 %     MCV      80.0 - 99.0 FL     MCH      26.0 - 34.0 PG     MCHC      30.0 - 36.5 g/dL     RDW      11.5 - 14.5 % PLATELET      531 - 115 K/uL     NRBC      0  WBC     ABSOLUTE NRBC      0.00 - 0.01 K/uL     NEUTROPHILS      32 - 75 %     LYMPHOCYTES      12 - 49 %     MONOCYTES      5 - 13 %     EOSINOPHILS      0 - 7 %     BASOPHILS      0 - 1 %     IMMATURE GRANULOCYTES      0.0 - 0.5 %     ABS. NEUTROPHILS      1.8 - 8.0 K/UL     ABS. LYMPHOCYTES      0.8 - 3.5 K/UL     ABS. MONOCYTES      0.0 - 1.0 K/UL     ABS. EOSINOPHILS      0.0 - 0.4 K/UL     ABS. BASOPHILS      0.0 - 0.1 K/UL     ABS. IMM. GRANS.      0.00 - 0.04 K/UL     DF           RBC COMMENTS           Sodium      136 - 145 mmol/L 138    Potassium      3.5 - 5.1 mmol/L 3.7    Chloride      97 - 108 mmol/L 107    CO2      21 - 32 mmol/L 22    Anion gap      5 - 15 mmol/L 9    Glucose      65 - 100 mg/dL 97    BUN      6 - 20 MG/DL 4 (L)    Creatinine      0.55 - 1.02 MG/DL 0.59    BUN/Creatinine ratio      12 - 20   7 (L)    GFR est AA      >60 ml/min/1.73m2 >60    GFR est non-AA      >60 ml/min/1.73m2 >60    Calcium      8.5 - 10.1 MG/DL 8.9    Bilirubin, total      0.2 - 1.0 MG/DL 1.3 (H)    ALT      12 - 78 U/L 14    AST      15 - 37 U/L 24    Alk. phosphatase      45 - 117 U/L 91    Protein, total      6.4 - 8.2 g/dL 8.3 (H)    Albumin      3.5 - 5.0 g/dL 3.6    Globulin      2.0 - 4.0 g/dL 4.7 (H)    A-G Ratio      1.1 - 2.2   0.8 (L)    TSH      0.36 - 3.74 uIU/mL  0.84     MRI Results (maximum last 3): Results from [place] encounter on 02/22/22    MRI BRAIN WO CONT    Narrative  EXAM: MRI BRAIN WO CONT    INDICATION: Acute encephalopathy    COMPARISON: None. CONTRAST: None. TECHNIQUE:  Multiplanar multisequence acquisition without contrast of the brain. FINDINGS:  Diffusion imaging does not show acute ischemic changes. There is no extra-axial fluid collection hemorrhage or shift. Ventricles size is normal.  Flow voids in major vessels at the base of the brain are present. Minimal nonspecific white matter changes.   No mass.  Incidental mucosal thickening in the paranasal sinuses. Impression  1. Acute findings no mass. 2. Minimal nonspecific white matter changes. Results from [place] encounter on 11/21/17    MRA BRAIN WO CONT    Narrative  INDICATION:   Possible cerebral aneurysm-----principal manifestation pulsatile  tinnitus    COMPARISON:  None    TECHNIQUE:  3-D time-of-flight MRA of the brain was performed. Multiplanar reconstructions  were obtained. FINDINGS:  The vertebral arteries are codominant. The basilar artery and its branches are  normal. The internal carotid, anterior cerebral, and middle cerebral arteries  are patent. There is no flow-limiting intracranial stenosis. There is no  aneurysm. There are no sizable posterior communicating arteries. Left anterior  inferior cerebellar artery approaches but does not enter the left internal  auditory canal    Impression  IMPRESSION:  1. No flow-limiting stenosis or aneurysm is demonstrated. ASSESSMENT:      ICD-10-CM ICD-9-CM    1. Altered awareness, transient  R40.4 780.02 VITAMIN B12      NEURO EEG 24 HR      REFERRAL TO PSYCHOLOGY   2. Cognitive impairment  R41.89 294.9 VITAMIN B12      NEURO EEG 24 HR      REFERRAL TO PSYCHOLOGY   63year old female referred for evaluation of memory and episodic prolonged episodes of acute encephalopathy occurring 12/2021 and again 2/2022 lasting 3-5 days on average. First episodes was preceded by gastrointestinal symptoms with recurrent emesis raising the possibility of metabolic encephalopathy. No clear inciting events prior to her episode in February. She denies seizure like activity or seizure risk factors. Will obtain EEG monitoring to exclude the possibility of epileptiform activity, although seizures would appear atypical given duration of episodes over several days. MRI Brain WO 2/22/22 did not reveal any acute structural pathology that would otherwise contribute to above episodes.  MOCA was 19/30 on today's evaluation suggestive of a baseline moderate cognitive impairment. We discussed the implications of this and need for assistance with medications as well as finances. We will move forward with neuropsychologic testing as well to exclude any contribution from comorbid depression. 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 Will defer electrodiagnostic testing for now unless symptoms become more persistent/bothersome. PLAN:  Labs: B12  24h EEG   Neuropsychologic testing  Heart healthy diet and exercise  Regular scheduled cognitive and social engagement. Follow-up and Dispositions    · Return in about 3 months (around 8/3/2022). I have discussed the diagnosis with the patient today and the intended plan as seen in the above orders with both the patient as well as referring provider and/or PCP via electronic correspondence. The patient has received an after-visit summary and questions were answered concerning future plans. I have discussed medication side effects and warnings with the patient as well. Nohemi Samuel DO  Staff Neurologist  Diplomate, American Board of Psychiatry & Neurology       CC Referring provider:    Carol Carmona MD

## 2022-05-04 LAB — VIT B12 SERPL-MCNC: 299 PG/ML (ref 232–1245)

## 2022-05-05 ENCOUNTER — TELEPHONE (OUTPATIENT)
Dept: NEUROLOGY | Age: 62
End: 2022-05-05

## 2022-05-05 DIAGNOSIS — D57.00 SICKLE CELL CRISIS (HCC): Primary | ICD-10-CM

## 2022-05-05 RX ORDER — FENTANYL 75 UG/H
1 PATCH TRANSDERMAL
Qty: 10 PATCH | Refills: 0 | Status: SHIPPED | OUTPATIENT
Start: 2022-05-05 | End: 2022-06-04

## 2022-05-05 NOTE — TELEPHONE ENCOUNTER
Spoke with patient, informed her per -B12 is in low normal range. Would recommend she start supplementation B12 1000mcg daily.

## 2022-05-20 DIAGNOSIS — D57.00 SICKLE CELL CRISIS (HCC): Primary | ICD-10-CM

## 2022-05-21 RX ORDER — HYDROCODONE BITARTRATE AND ACETAMINOPHEN 10; 325 MG/1; MG/1
1 TABLET ORAL
Qty: 120 TABLET | Refills: 0 | Status: SHIPPED | OUTPATIENT
Start: 2022-05-21 | End: 2022-06-16 | Stop reason: SDUPTHER

## 2022-05-23 ENCOUNTER — OFFICE VISIT (OUTPATIENT)
Dept: INTERNAL MEDICINE CLINIC | Age: 62
End: 2022-05-23
Payer: MEDICARE

## 2022-05-23 VITALS
BODY MASS INDEX: 26.23 KG/M2 | HEART RATE: 92 BPM | OXYGEN SATURATION: 96 % | DIASTOLIC BLOOD PRESSURE: 87 MMHG | SYSTOLIC BLOOD PRESSURE: 129 MMHG | HEIGHT: 71 IN | RESPIRATION RATE: 16 BRPM | WEIGHT: 187.4 LBS | TEMPERATURE: 99.2 F

## 2022-05-23 DIAGNOSIS — D57.00 SICKLE CELL CRISIS (HCC): ICD-10-CM

## 2022-05-23 DIAGNOSIS — F32.9 REACTIVE DEPRESSION: ICD-10-CM

## 2022-05-23 DIAGNOSIS — G93.40 ENCEPHALOPATHY ACUTE: Primary | ICD-10-CM

## 2022-05-23 DIAGNOSIS — Z12.31 SCREENING MAMMOGRAM, ENCOUNTER FOR: ICD-10-CM

## 2022-05-23 DIAGNOSIS — I10 PRIMARY HYPERTENSION: ICD-10-CM

## 2022-05-23 PROCEDURE — G8419 CALC BMI OUT NRM PARAM NOF/U: HCPCS | Performed by: INTERNAL MEDICINE

## 2022-05-23 PROCEDURE — G8427 DOCREV CUR MEDS BY ELIG CLIN: HCPCS | Performed by: INTERNAL MEDICINE

## 2022-05-23 PROCEDURE — G8754 DIAS BP LESS 90: HCPCS | Performed by: INTERNAL MEDICINE

## 2022-05-23 PROCEDURE — G8752 SYS BP LESS 140: HCPCS | Performed by: INTERNAL MEDICINE

## 2022-05-23 PROCEDURE — G9717 DOC PT DX DEP/BP F/U NT REQ: HCPCS | Performed by: INTERNAL MEDICINE

## 2022-05-23 PROCEDURE — 99214 OFFICE O/P EST MOD 30 MIN: CPT | Performed by: INTERNAL MEDICINE

## 2022-05-23 PROCEDURE — 3017F COLORECTAL CA SCREEN DOC REV: CPT | Performed by: INTERNAL MEDICINE

## 2022-05-23 PROCEDURE — G9899 SCRN MAM PERF RSLTS DOC: HCPCS | Performed by: INTERNAL MEDICINE

## 2022-05-23 NOTE — PROGRESS NOTES
No chief complaint on file. 1. Have you been to the ER, urgent care clinic since your last visit? Hospitalized since your last visit? No    2. Have you seen or consulted any other health care providers outside of the [address] [place] [address] since your last visit? Include any pap smears or colon screening.  No

## 2022-05-24 ENCOUNTER — HOSPITAL ENCOUNTER (OUTPATIENT)
Dept: NEUROLOGY | Age: 62
Discharge: HOME | End: 2022-05-24
Attending: PSYCHIATRY & NEUROLOGY
Payer: MEDICARE

## 2022-05-24 DIAGNOSIS — R41.89 COGNITIVE IMPAIRMENT: ICD-10-CM

## 2022-05-24 DIAGNOSIS — R40.4 ALTERED AWARENESS, TRANSIENT: ICD-10-CM

## 2022-05-24 PROCEDURE — 95714 VEEG EA 12-26 HR UNMNTR: CPT

## 2022-05-24 PROCEDURE — 95719 EEG PHYS/QHP EA INCR W/O VID: CPT | Performed by: PSYCHIATRY & NEUROLOGY

## 2022-05-29 NOTE — PROGRESS NOTES
[address] [place] [address] and Primary Care  [place] [address]  [address] [place] [address]  Phone:  [phone]  Fax: [phone]     No chief complaint on file. .      SUBJECTIVE:    Savannah Perez is a 63 y.o. female comes in for return visit stating that she has done reasonably well. She is seeing the neurologist regarding the encephalopathic episode that she has had on two separate occasions. The part of the workup evaluating her mental competency as it relates to possible dementia. She was quite low on the scale. She has been doing quite well mentally. She does not take as much of her analgesics as she formerly did. She has not had a major flare up of late either. Her weight has been reasonably stable. She has a past history of primary hypertension and dyslipidemia. Current Outpatient Medications   Medication Sig Dispense Refill    HYDROcodone-acetaminophen (NORCO)  mg tablet Take 1 Tablet by mouth every six (6) hours as needed for Pain for up to 30 days. Max Daily Amount: 4 Tablets. Dx.d57.00 120 Tablet 0    fentaNYL (DURAGESIC) 75 mcg/hr 1 Patch by TransDERmal route every seventy-two (72) hours for 30 days. Max Daily Amount: 1 Patch. 10 Patch 0    folic acid (FOLVITE) 1 mg tablet TAKE 1 TABLET BY MOUTH DAILY 30 Tablet 11    FLUoxetine (PROzac) 10 mg capsule TAKE 1 CAPSULE BY MOUTH DAILY 30 Capsule 11    hydroxyurea (HYDREA) 500 mg capsule TAKE 1 CAPSULE BY MOUTH TWICE DAILY 60 Capsule 11    meclizine (ANTIVERT) 25 mg tablet Take 1 Tablet by mouth three (3) times daily as needed for Dizziness. 20 Tablet 0    ondansetron (Zofran ODT) 4 mg disintegrating tablet Take 1 Tablet by mouth every eight (8) hours as needed for Nausea. 10 Tablet 0    telmisartan (MICARDIS) 40 mg tablet Take 1 Tablet by mouth nightly.  30 Tablet 11    promethazine (PHENERGAN) 25 mg tablet TAKE 1 TABLET BY MOUTH EVERY 8 HOURS AS NEEDED FOR NAUSEA 90 Tablet 11    hydrocortisone (Cortisone) 1 % topical cream Apply  to affected area two (2) times daily as needed for Skin Irritation. use thin layer 30 g 11    omeprazole (PRILOSEC OTC) 20 mg tablet Take 20 mg by mouth nightly.        Past Medical History:   Diagnosis Date    Anemia     SC hemoglobinopathy    Chronic pain     Depression     Hypertension     Liver disease     hepatitis C; pt reports \"cured\"    Sickle cell disease ([facility] [address]      Past Surgical History:   Procedure Laterality Date    HX BREAST BIOPSY Right 05/2007    negative    HX CHOLECYSTECTOMY      HX ORTHOPAEDIC      bony curettage of an ostial myelitis focus    [place] BREAST SURGERY PROCEDURE UNLISTED      2 cysts removed from R breast    [place] CHEST SURGERY PROCEDURE UNLISTED      status post thor for removal of a benign      Allergies   Allergen Reactions    Dilaudid [Hydromorphone (Bulk)] Itching     Can tolerate with benadryl and ondansetron    Percocet [Oxycodone-Acetaminophen] Itching         REVIEW OF SYSTEMS:  General: negative for - chills or fever  ENT: negative for - headaches, nasal congestion or tinnitus  Respiratory: negative for - cough, hemoptysis, shortness of breath or wheezing  Cardiovascular : negative for - chest pain, edema, palpitations or shortness of breath  Gastrointestinal: negative for - abdominal pain, blood in stools, heartburn or nausea/vomiting  Genito-Urinary: no dysuria, trouble voiding, or hematuria  Musculoskeletal: negative for - gait disturbance, joint pain, joint stiffness or joint swelling  Neurological: no TIA or stroke symptoms  Hematologic: no bruises, no bleeding, no swollen glands  Integument: no lumps, mole changes, nail changes or rash  Endocrine: no malaise/lethargy or unexpected weight changes      Social History     Socioeconomic History    Marital status:     Number of children: 2   Occupational History    Occupation: disabled---Sickle cell disease   Tobacco Use    Smoking status: Never Smoker    Smokeless tobacco: Never Used Vaping Use    Vaping Use: Never used   Substance and Sexual Activity    Alcohol use: No    Drug use: No    Sexual activity: Yes     Partners: Male     Family History   Problem Relation Age of Onset    Hypertension Mother     Hypertension Father        OBJECTIVE:    Visit Vitals  /87   Pulse 92   Temp 99.2 °F (37.3 °C) (Oral)   Resp 16   Ht 5' 11\" (1.803 m)   Wt 187 lb 6.4 oz (85 kg)   SpO2 96%   BMI 26.14 kg/m²     CONSTITUTIONAL: well , well nourished, appears age appropriate  EYES: perrla, eom intact  ENMT:moist mucous membranes, pharynx clear  NECK: supple. Thyroid normal  RESPIRATORY: Chest: clear to ascultation and percussion   CARDIOVASCULAR: Heart: regular rate and rhythm  GASTROINTESTINAL: Abdomen: soft, bowel sounds active  HEMATOLOGIC: no pathological lymph nodes palpated  MUSCULOSKELETAL: Extremities: no edema, pulse 1+   INTEGUMENT: No unusual rashes or suspicious skin lesions noted. Nails appear normal.  NEUROLOGIC: non-focal exam   MENTAL STATUS: alert and oriented, appropriate affect      ASSESSMENT:  1. Encephalopathy acute    2. Primary hypertension    3. Sickle cell crisis (HCC)    4. Reactive depression    5. Screening mammogram, encounter for        PLAN:  1. She will continue the workup by the neurologist regarding her encephalopathic episodes. 2. Her blood pressure is excellent today. No adjustments are made. 3. Her sickle cell disease is reasonably stable. 4. She is not overtly depressed currently. She thinks possibly that the episodes that she had were indeed related to depression. 5. Screening mammography will be obtained. Follow-up and Dispositions    · Return in about 3 months (around 8/23/2022).            Davina Fowler MD

## 2022-06-10 ENCOUNTER — PATIENT OUTREACH (OUTPATIENT)
Dept: CASE MANAGEMENT | Age: 62
End: 2022-06-10

## 2022-06-13 NOTE — PROGRESS NOTES
Ambulatory Care Management Note    Date/Time:  6/10/2022 1:32 PM    This patient was received as a referral from [facility]. Ambulatory Care Manager outreached to patient today to offer care management services. Introduction to self and role of care manager provided. Patient accepted care management services at this time. Follow up call scheduled at this time. Patient has Ambulatory Care Manager's contact number for for any questions or concerns.

## 2022-06-16 DIAGNOSIS — D57.00 SICKLE CELL CRISIS (HCC): ICD-10-CM

## 2022-06-20 RX ORDER — HYDROCODONE BITARTRATE AND ACETAMINOPHEN 10; 325 MG/1; MG/1
1 TABLET ORAL
Qty: 120 TABLET | Refills: 0 | Status: SHIPPED | OUTPATIENT
Start: 2022-06-20 | End: 2022-07-21 | Stop reason: SDUPTHER

## 2022-06-30 ENCOUNTER — PATIENT OUTREACH (OUTPATIENT)
Dept: CASE MANAGEMENT | Age: 62
End: 2022-06-30

## 2022-07-12 ENCOUNTER — PATIENT OUTREACH (OUTPATIENT)
Dept: CASE MANAGEMENT | Age: 62
End: 2022-07-12

## 2022-07-12 NOTE — PROGRESS NOTES
Patient on Complex CM Enrollment Report/fu Contact. Unable to LM on voice mail with my contact information for return call due to box full. Need to assess for ongoing needs and CCM enrollment/fu.

## 2022-07-21 DIAGNOSIS — D57.00 SICKLE CELL CRISIS (HCC): ICD-10-CM

## 2022-07-21 RX ORDER — HYDROCODONE BITARTRATE AND ACETAMINOPHEN 10; 325 MG/1; MG/1
1 TABLET ORAL
Qty: 120 TABLET | Refills: 0 | Status: SHIPPED | OUTPATIENT
Start: 2022-07-21 | End: 2022-08-20

## 2022-07-24 RX ORDER — TELMISARTAN 40 MG/1
TABLET ORAL
Qty: 30 TABLET | Refills: 11 | Status: SHIPPED | OUTPATIENT
Start: 2022-07-24

## 2022-07-27 RX ORDER — LOSARTAN POTASSIUM 100 MG/1
100 TABLET ORAL DAILY
Qty: 30 TABLET | Refills: 11 | Status: SHIPPED | OUTPATIENT
Start: 2022-07-27 | End: 2022-08-23

## 2022-07-29 ENCOUNTER — TELEPHONE (OUTPATIENT)
Dept: NEUROLOGY | Age: 62
End: 2022-07-29

## 2022-07-29 NOTE — TELEPHONE ENCOUNTER
Patient stated that the doctor ordered a cognitive test and she cannot remember where she schedule it. Please call.

## 2022-08-18 ENCOUNTER — PATIENT OUTREACH (OUTPATIENT)
Dept: CASE MANAGEMENT | Age: 62
End: 2022-08-18

## 2022-08-18 NOTE — PROGRESS NOTES
CCM:  HTN/Sick Cell Anemia/Reactive Depression follow-up  Patient on Complex CM Enrollment Report/fu Contact. Left message on voice mail with my contact information for return call. Need to assess for ongoing needs and CCM enrollment/fu.

## 2022-08-22 DIAGNOSIS — D57.00 SICKLE CELL CRISIS (HCC): Primary | ICD-10-CM

## 2022-08-22 RX ORDER — FENTANYL 75 UG/H
1 PATCH TRANSDERMAL
Qty: 10 PATCH | Refills: 0 | Status: SHIPPED | OUTPATIENT
Start: 2022-08-22 | End: 2022-09-21

## 2022-08-23 ENCOUNTER — OFFICE VISIT (OUTPATIENT)
Dept: NEUROLOGY | Age: 62
End: 2022-08-23
Payer: MEDICARE

## 2022-08-23 ENCOUNTER — OFFICE VISIT (OUTPATIENT)
Dept: INTERNAL MEDICINE CLINIC | Age: 62
End: 2022-08-23

## 2022-08-23 VITALS
OXYGEN SATURATION: 100 % | DIASTOLIC BLOOD PRESSURE: 82 MMHG | RESPIRATION RATE: 16 BRPM | HEIGHT: 70 IN | SYSTOLIC BLOOD PRESSURE: 119 MMHG | BODY MASS INDEX: 26.24 KG/M2 | HEART RATE: 96 BPM | WEIGHT: 183.3 LBS | TEMPERATURE: 99.1 F

## 2022-08-23 VITALS
DIASTOLIC BLOOD PRESSURE: 82 MMHG | WEIGHT: 181 LBS | HEART RATE: 112 BPM | SYSTOLIC BLOOD PRESSURE: 128 MMHG | HEIGHT: 70 IN | BODY MASS INDEX: 25.91 KG/M2 | OXYGEN SATURATION: 98 %

## 2022-08-23 DIAGNOSIS — R41.89 COGNITIVE IMPAIRMENT: Primary | ICD-10-CM

## 2022-08-23 DIAGNOSIS — D57.00 SICKLE CELL CRISIS (HCC): ICD-10-CM

## 2022-08-23 DIAGNOSIS — F32.A DEPRESSION, UNSPECIFIED DEPRESSION TYPE: ICD-10-CM

## 2022-08-23 DIAGNOSIS — I10 PRIMARY HYPERTENSION: Primary | ICD-10-CM

## 2022-08-23 DIAGNOSIS — E66.3 OVERWEIGHT: ICD-10-CM

## 2022-08-23 DIAGNOSIS — I87.2 VENOUS INSUFFICIENCY: ICD-10-CM

## 2022-08-23 PROCEDURE — G8752 SYS BP LESS 140: HCPCS | Performed by: INTERNAL MEDICINE

## 2022-08-23 PROCEDURE — G8752 SYS BP LESS 140: HCPCS | Performed by: PSYCHIATRY & NEUROLOGY

## 2022-08-23 PROCEDURE — 3017F COLORECTAL CA SCREEN DOC REV: CPT | Performed by: PSYCHIATRY & NEUROLOGY

## 2022-08-23 PROCEDURE — G9899 SCRN MAM PERF RSLTS DOC: HCPCS | Performed by: INTERNAL MEDICINE

## 2022-08-23 PROCEDURE — G8754 DIAS BP LESS 90: HCPCS | Performed by: PSYCHIATRY & NEUROLOGY

## 2022-08-23 PROCEDURE — G8427 DOCREV CUR MEDS BY ELIG CLIN: HCPCS | Performed by: PSYCHIATRY & NEUROLOGY

## 2022-08-23 PROCEDURE — 99214 OFFICE O/P EST MOD 30 MIN: CPT | Performed by: INTERNAL MEDICINE

## 2022-08-23 PROCEDURE — 99214 OFFICE O/P EST MOD 30 MIN: CPT | Performed by: PSYCHIATRY & NEUROLOGY

## 2022-08-23 PROCEDURE — G8428 CUR MEDS NOT DOCUMENT: HCPCS | Performed by: INTERNAL MEDICINE

## 2022-08-23 PROCEDURE — G9717 DOC PT DX DEP/BP F/U NT REQ: HCPCS | Performed by: PSYCHIATRY & NEUROLOGY

## 2022-08-23 PROCEDURE — G9717 DOC PT DX DEP/BP F/U NT REQ: HCPCS | Performed by: INTERNAL MEDICINE

## 2022-08-23 PROCEDURE — G8419 CALC BMI OUT NRM PARAM NOF/U: HCPCS | Performed by: PSYCHIATRY & NEUROLOGY

## 2022-08-23 PROCEDURE — 3017F COLORECTAL CA SCREEN DOC REV: CPT | Performed by: INTERNAL MEDICINE

## 2022-08-23 PROCEDURE — G9899 SCRN MAM PERF RSLTS DOC: HCPCS | Performed by: PSYCHIATRY & NEUROLOGY

## 2022-08-23 PROCEDURE — G8754 DIAS BP LESS 90: HCPCS | Performed by: INTERNAL MEDICINE

## 2022-08-23 PROCEDURE — G8419 CALC BMI OUT NRM PARAM NOF/U: HCPCS | Performed by: INTERNAL MEDICINE

## 2022-08-23 NOTE — ASSESSMENT & PLAN NOTE
Stable, based on history, physical exam and review of pertinent labs, studies and medications; meds reconciled; continue current treatment plan.   Lab Results   Component Value Date/Time    WBC 8.1 01/30/2018 05:23 PM    HGB 7.7 01/30/2018 05:23 PM    HCT 23.3 01/30/2018 05:23 PM    PLATELET 108 13/75/6521 05:23 PM    Creatinine 0.80 01/13/2018 01:51 PM    BUN 6 01/13/2018 01:51 PM    Potassium 3.7 01/13/2018 01:51 PM Yes

## 2022-08-23 NOTE — PROGRESS NOTES
Identified pt with two pt identifiers(name and ). Reviewed record in preparation for visit and have obtained necessary documentation. Chief Complaint   Patient presents with    Follow-up        Health Maintenance Due   Topic    Pneumococcal 0-64 years (1 - PCV)    Shingrix Vaccine Age 50> (1 of 2)    Cervical cancer screen     Breast Cancer Screen Mammogram     COVID-19 Vaccine (3 - Booster for Pfizer series)    Medicare Yearly Exam       Visit Vitals  /82 (BP 1 Location: Right arm, BP Patient Position: Sitting, BP Cuff Size: Large adult)   Pulse 96   Temp 99.1 °F (37.3 °C) (Oral)   Resp 16   Ht 5' 10\" (1.778 m)   Wt 183 lb 4.8 oz (83.1 kg)   SpO2 100%   BMI 26.30 kg/m²     Pain Scale: 3/10    Coordination of Care Questionnaire:  :   1. Have you been to the ER, urgent care clinic since your last visit? Hospitalized since your last visit? No    2. Have you seen or consulted any other health care providers outside of the [address] [place] [address] since your last visit? Include any pap smears or colon screening.  No

## 2022-08-23 NOTE — PROGRESS NOTES
Neurology Clinic Follow up Note    Patient ID:  Ciera Chatterjee  [id number]  63 y.o.  1960      Ms. Kj Robbins is here for follow up today of  Chief Complaint   Patient presents with    Memory Loss     Follow up from eeg  memory loss seems to have improved. Last Appointment With Me:  5/3/2022     \"Brinda Mcghee is a 63 y.o.  female presenting for evaluation of episodes of AMS since 12/2021. She reports a week prior to [place], she developed N/V with GI upset 12/15. From 12/16-21 she does not recall daily events apart from talking to her mother on 12/16. She regained awareness 12/21 when family members were attempting to call her and knocked on her door after she missed a flight to [place] in [place]. She does recall being able to get up to go to the restroom but felt very dizzy resulting in a fall x 3 over this same time period. She felt better 12/22. She was concerned about potential dehydration from excessive vomiting prior to above events. She denied incontinence or tongue biting. She did well until 2/2022. She had a subsequent episode lasting approximately 3 days. She doesn't recall events in specific detail. Denies eating/drinking during this time period. She does report feeling a generalized aching prior to above episode which she attributed to her sickle cell disease. Denies using narcotics for he sickle cell related pain. No h/o seizures, TBI, CNS infection.       Memory: Admits to recall deficits, short term  Language: Occasional word finding difficulty  Change in personality: None  Physical changes: RUE numbness, intermittent when awakening after lying on the right side  Depressive symptoms: Yes, worse during above episodes but better presently  Hallucinations/Delusions: Denies    Ability to function:  Driving: Yes, no issues  Finances: Handles independently, no current issues  Cooking: Yes, no issues  Manages own medication: Yes, no issues  Residing: Alone    Prior work-up: MRI brain 2/22/22 without acute pathology\"  Interval History:   Denies recurrent AMS since last visit. She has noted an improvement with her depression, now eating more regularly. She states she is now taking better care of herself and feels her thinking has improved. Neuropsychologic testing is pending. She has started B12 supplementation. PMHx/ PSHx/ FHx/ SHx:  Reviewed and unchanged previous visit. Past Medical History:   Diagnosis Date    Anemia     SC hemoglobinopathy    Chronic pain     Depression     Hypertension     Liver disease     hepatitis C; pt reports \"cured\"    Sickle cell disease ([facility] [address]          ROS:  Comprehensive review of systems negative except for as noted above. Objective:       Meds:  Current Outpatient Medications   Medication Sig Dispense Refill    fentaNYL (DURAGESIC) 75 mcg/hr 1 Patch by TransDERmal route every seventy-two (72) hours for 30 days. Max Daily Amount: 1 Patch. 10 Patch 0    telmisartan (MICARDIS) 40 mg tablet TAKE 1 TABLET BY MOUTH EVERY NIGHT 30 Tablet 11    folic acid (FOLVITE) 1 mg tablet TAKE 1 TABLET BY MOUTH DAILY 30 Tablet 11    FLUoxetine (PROzac) 10 mg capsule TAKE 1 CAPSULE BY MOUTH DAILY 30 Capsule 11    hydroxyurea (HYDREA) 500 mg capsule TAKE 1 CAPSULE BY MOUTH TWICE DAILY 60 Capsule 11    ondansetron (Zofran ODT) 4 mg disintegrating tablet Take 1 Tablet by mouth every eight (8) hours as needed for Nausea. 10 Tablet 0    promethazine (PHENERGAN) 25 mg tablet TAKE 1 TABLET BY MOUTH EVERY 8 HOURS AS NEEDED FOR NAUSEA 90 Tablet 11    hydrocortisone (Cortisone) 1 % topical cream Apply  to affected area two (2) times daily as needed for Skin Irritation. use thin layer 30 g 11    omeprazole (PRILOSEC OTC) 20 mg tablet Take 20 mg by mouth nightly. Exam:  Visit Vitals  /82   Pulse (!) 112   Ht 5' 10\" (1.778 m)   Wt 181 lb (82.1 kg)   SpO2 98%   BMI 25.97 kg/m²     NEUROLOGICAL EXAM:  General: Awake, alert, speech fluent. Oriented to date, place, self. Serial 7's impaired. Delayed recall: 3/5  CN: PERRL, EOMI without nystagmus, VFF to confrontation, facial sensation and strength are normal and symmetric, hearing is intact to finger rub bilaterally, palate and tongue movements are intact and symmetric. Motor: Normal tone, bulk and strength bilaterally. Reflexes: 2/4 and symmetric, plantar stimulation is flexor. Coordination: FNF, JUAN, HTS intact. Sensation: LT intact throughout. Gait: Normal-based and steady. LABS  Results for orders placed or performed in visit on 05/03/22   VITAMIN B12   Result Value Ref Range    Vitamin B12 299 232 - 1,245 pg/mL       IMAGING:  MRI Results (most recent):  Results from [place] encounter on 02/22/22    MRI BRAIN WO CONT    Narrative  EXAM: MRI BRAIN WO CONT    INDICATION: Acute encephalopathy    COMPARISON: None. CONTRAST: None. TECHNIQUE:  Multiplanar multisequence acquisition without contrast of the brain. FINDINGS:  Diffusion imaging does not show acute ischemic changes. There is no extra-axial fluid collection hemorrhage or shift. Ventricles size is normal.  Flow voids in major vessels at the base of the brain are present. Minimal nonspecific white matter changes. No mass. Incidental mucosal thickening in the paranasal sinuses. Impression  1. Acute findings no mass. 2. Minimal nonspecific white matter changes. Assessment:     Encounter Diagnoses     ICD-10-CM ICD-9-CM   1. Cognitive impairment  R41.89 294.9   2. Depression, unspecified depression type  F32. A 32   63year old female here for follow up of memory and episodic prolonged episodes of acute encephalopathy occurring 12/2021 and again 2/2022 lasting 3-5 days on average. First episodes was preceded by gastrointestinal symptoms with recurrent emesis raising the possibility of metabolic encephalopathy. No clear inciting events prior to her episode in February. She denies seizure like activity or seizure risk factors. Extended EEG was completed without evidence of epileptiform activity. Seizures would appear atypical given duration of episodes over several days. MRI Brain WO 2/22/22 did not reveal any acute structural pathology that would otherwise contribute to above episodes. MOCA was 19/30 on initial evaluation suggestive of a baseline moderate cognitive impairment. Recall/attention deficits appear somewhat improved today. We will move forward with neuropsychologic testing for further evaluation of potential evolving cognitive process/dementia and to exclude any contribution from comorbid depression. Plan:   Neuropsychologic testing pending  Continue B12 supplementation  Heart healthy diet and exercise  Regular scheduled cognitive and social engagement. Follow-up and Dispositions    Return in about 4 months (around 12/23/2022). I have discussed the diagnosis with the patient today and the intended plan as seen in the above orders with both the patient as well as referring provider and/or PCP via electronic correspondence. The patient has received an after-visit summary and questions were answered concerning future plans. I have discussed medication side effects and warnings with the patient as well.       Signed:  Kae Al DO  8/23/2022

## 2022-08-24 DIAGNOSIS — D57.00 SICKLE CELL CRISIS (HCC): Primary | ICD-10-CM

## 2022-08-24 RX ORDER — HYDROCODONE BITARTRATE AND ACETAMINOPHEN 10; 325 MG/1; MG/1
1 TABLET ORAL
Qty: 120 TABLET | Refills: 0 | Status: SHIPPED | OUTPATIENT
Start: 2022-08-24 | End: 2022-09-23

## 2022-08-26 DIAGNOSIS — D57.1 HB-SS DISEASE WITHOUT CRISIS (HCC): ICD-10-CM

## 2022-08-26 RX ORDER — PROMETHAZINE HYDROCHLORIDE 25 MG/1
TABLET ORAL
Qty: 90 TABLET | Refills: 11 | Status: SHIPPED | OUTPATIENT
Start: 2022-08-26

## 2022-08-28 NOTE — PROGRESS NOTES
[facility]  [place] [address]  [address]  [address]  Phone:  [phone]  Fax: [phone]       Chief Complaint   Patient presents with    Follow-up   . SUBJECTIVE:    Sheyla Vu is a 63 y.o. female comes in for return visit stating that she has done reasonably well. She was following the neurologist, but feels that she does not really need to go any longer. The focus of her attention is dementia. Theoretically, the issue that happened to her at home on two separate occasions does not appear to me to clinically be a sign of dementia. She is able to pretty much take care of all of her activities of daily living. I will leave this to her. Her sickle cell disease remains active with chronic pain, but the current regimen that she has is quite reasonable. She has a past history of mild depression, primary hypertension and dyslipidemia. Current Outpatient Medications   Medication Sig Dispense Refill    fentaNYL (DURAGESIC) 75 mcg/hr 1 Patch by TransDERmal route every seventy-two (72) hours for 30 days. Max Daily Amount: 1 Patch. 10 Patch 0    telmisartan (MICARDIS) 40 mg tablet TAKE 1 TABLET BY MOUTH EVERY NIGHT 30 Tablet 11    folic acid (FOLVITE) 1 mg tablet TAKE 1 TABLET BY MOUTH DAILY 30 Tablet 11    FLUoxetine (PROzac) 10 mg capsule TAKE 1 CAPSULE BY MOUTH DAILY 30 Capsule 11    hydroxyurea (HYDREA) 500 mg capsule TAKE 1 CAPSULE BY MOUTH TWICE DAILY 60 Capsule 11    ondansetron (Zofran ODT) 4 mg disintegrating tablet Take 1 Tablet by mouth every eight (8) hours as needed for Nausea. 10 Tablet 0    hydrocortisone (Cortisone) 1 % topical cream Apply  to affected area two (2) times daily as needed for Skin Irritation. use thin layer 30 g 11    omeprazole (PRILOSEC OTC) 20 mg tablet Take 20 mg by mouth nightly.       promethazine (PHENERGAN) 25 mg tablet TAKE 1 TABLET BY MOUTH EVERY 8 HOURS AS NEEDED FOR NAUSEA 90 Tablet 11 HYDROcodone-acetaminophen (NORCO)  mg tablet Take 1 Tablet by mouth every six (6) hours as needed for Pain for up to 30 days. Max Daily Amount: 4 Tablets.  Dx.D57.00 120 Tablet 0     Past Medical History:   Diagnosis Date    Anemia     SC hemoglobinopathy    Chronic pain     Depression     Hypertension     Liver disease     hepatitis C; pt reports \"cured\"    Sickle cell disease ([address]      Past Surgical History:   Procedure Laterality Date    HX BREAST BIOPSY Right 05/2007    negative    HX CHOLECYSTECTOMY      HX ORTHOPAEDIC      bony curettage of an ostial myelitis focus    AR BREAST SURGERY PROCEDURE UNLISTED      2 cysts removed from R breast    AR CHEST SURGERY PROCEDURE UNLISTED      status post thor for removal of a benign      Allergies   Allergen Reactions    Dilaudid [Hydromorphone (Bulk)] Itching     Can tolerate with benadryl and ondansetron    Percocet [Oxycodone-Acetaminophen] Itching         REVIEW OF SYSTEMS:  General: negative for - chills or fever  ENT: negative for - headaches, nasal congestion or tinnitus  Respiratory: negative for - cough, hemoptysis, shortness of breath or wheezing  Cardiovascular : negative for - chest pain, edema, palpitations or shortness of breath  Gastrointestinal: negative for - abdominal pain, blood in stools, heartburn or nausea/vomiting  Genito-Urinary: no dysuria, trouble voiding, or hematuria  Musculoskeletal: negative for - gait disturbance, joint pain, joint stiffness or joint swelling  Neurological: no TIA or stroke symptoms  Hematologic: no bruises, no bleeding, no swollen glands  Integument: no lumps, mole changes, nail changes or rash  Endocrine: no malaise/lethargy or unexpected weight changes      Social History     Socioeconomic History    Marital status:     Number of children: 2   Occupational History    Occupation: disabled---Sickle cell disease   Tobacco Use    Smoking status: Never    Smokeless tobacco: Never   Vaping Use    Vaping Use: Never used   Substance and Sexual Activity    Alcohol use: No    Drug use: No    Sexual activity: Yes     Partners: Male     Family History   Problem Relation Age of Onset    Hypertension Mother     Hypertension Father        OBJECTIVE:    Visit Vitals  /82 (BP 1 Location: Right arm, BP Patient Position: Sitting, BP Cuff Size: Large adult)   Pulse 96   Temp 99.1 °F (37.3 °C) (Oral)   Resp 16   Ht 5' 10\" (1.778 m)   Wt 183 lb 4.8 oz (83.1 kg)   SpO2 100%   BMI 26.30 kg/m²     CONSTITUTIONAL: well , well nourished, appears age appropriate  EYES: perrla, eom intact  ENMT:moist mucous membranes, pharynx clear  NECK: supple. Thyroid normal  RESPIRATORY: Chest: clear to ascultation and percussion   CARDIOVASCULAR: Heart: regular rate and rhythm  GASTROINTESTINAL: Abdomen: soft, bowel sounds active  HEMATOLOGIC: no pathological lymph nodes palpated  MUSCULOSKELETAL: Extremities: no edema, pulse 1+   INTEGUMENT: No unusual rashes or suspicious skin lesions noted. Nails appear normal.  NEUROLOGIC: non-focal exam   MENTAL STATUS: alert and oriented, appropriate affect      ASSESSMENT:  1. Primary hypertension    2. Venous insufficiency    3. Depression, unspecified depression type    4. Overweight    5. Sickle cell crisis ([address]        PLAN:  1. Blood pressure is acceptable today, no adjustments are made. 2. She does have trace venous insufficiency which is not a major problem for her currently. 3. Her depression is quite stable. She does not appear overtly depressed at the present time and she does remain on her antidepressant. 4. I strongly suggest that she minimize weight gain. This can be accomplished by eating meals, eliminating snacks, and avoiding the consumption of processed carbohydrates. 5. As far as her sickle cell disease is concerned she will continue her analgesics, as well as hydroxyurea. Follow-up and Dispositions    Return in about 4 months (around 12/23/2022).            Gaby Bassett, MD

## 2022-09-13 ENCOUNTER — PATIENT OUTREACH (OUTPATIENT)
Dept: CASE MANAGEMENT | Age: 62
End: 2022-09-13

## 2022-09-13 NOTE — ACP (ADVANCE CARE PLANNING)
Advance Care Planning:   Does patient have an Advance Directive:  currently not on file; education provided  Parveen Muhammad, brother, remains unchanged as [facility]. Decision Maker.

## 2022-09-13 NOTE — PROGRESS NOTES
Ambulatory Care Management Note    Date/Time:  9/13/2022 3:14 PM    This Ambulatory Care Manager (ACM) reviewed and updated the following screenings during this call; disease specific assessment     Patient's challenges to self-management identified:   medical condition      Medication Management:  good adherence    Advance Care Planning:   Does patient have an Advance Directive:  currently not on file; education provided     [employer], Referrals, and Durable Medical Equipment: Neurolology. Health Maintenance Due   Topic Date Due    Pneumococcal 0-64 years (1 - PCV) Never done    Shingrix Vaccine Age 50> (1 of 2) Never done    Cervical cancer screen  Never done    Breast Cancer Screen Mammogram  05/03/2020    COVID-19 Vaccine (3 - Booster for Pfizer series) 09/19/2021    Medicare Yearly Exam  04/10/2022    Flu Vaccine (1) 09/01/2022     Health Maintenance Reviewed: COVID Booster    Patient was asked to consider health care goals that they would like to focus on with this ACM. ACM will follow up with patient to discuss goals and establish care plan in the next 7-14 days. PCP/Specialist follow up:   Future Appointments   Date Time Provider Waldo Calvo   11/23/2022 12:45 PM Howard Winston MD [facility] MAIN BS AMB   12/28/2022  1:00 PM DO RHONDA Rodriguez BS AMB       Ambulatory Care Management Note    This patient was received as a referral from [address]. Top Challenges reviewed with the provider   Patient scheduled for out of town visits with family--plans to avoid crowds and planes as much as possible. Ambulatory  contacted patient for discussion and case management of Depression/Obesity/SSA   Summary of patients top problems:   Patient is diagnosed SS Anemia and longterm pain medication ordered which assist will be given for SSA crisis prevention when possible-proper diet/hydration and other sickness prevention.   Patient is diagnosed with depression which assistance to be provided due elevation of mood. Patient's challenges to self management identified:   medical condition      Medication Management:  good adherence    Advance Care Planning:   Does patient have an Advance Directive:  currently not on file; education provided  Jas Casarez, brother, remains unchanged as [facility] [place]. Decision Maker. [employer], Referrals, and Durable Medical Equipment: Neurology    PCP/Specialist follow up:   Future Appointments   Date Time Provider Waldo Calvo   11/23/2022 12:45 PM Tricia Bucio MD [facility] MAIN BS AMB   12/28/2022  1:00 PM DO RHONDA Kirby BS AMB           Goals Addressed                   This Visit's Progress     Develop action plan for Self-Management Chronic Disease. On track     CCM:  HTN/Sick Cell Anemia/Reactive Depression    9/13/2022:  Patient is planning trips with family for 2 weeks:  Patient Teach-Back SSA Action Plan: Medications & supply;  drinking adequate amounts of water;  not being around anyone who smokes;  minimize stress;  eat healthy/clean foods;  adequate rest/sleep. EW ACM            Patient verbalized understanding of all information discussed. Patient has this Ambulatory Care Manager's contact information for any further questions, concerns, or needs. Patient has graduated from the Complex Case Management  program on 9/13/2022. Patient/family has the ability to self-manage at this time. Care management goals have been completed. No further Ambulatory Care Manager follow up scheduled. Goals Addressed                   This Visit's Progress     Develop action plan for Self-Management Chronic Disease.    On track     CCM:  HTN/Sick Cell Anemia/Reactive Depression    9/13/2022:  Patient is planning trips with family for 2 weeks:  Patient Teach-Back SSA Action Plan: Medications & supply;  drinking adequate amounts of water;  not being around anyone who smokes;  minimize stress;  eat healthy/clean foods;  adequate rest/sleep. EW ACM               Patient has Ambulatory Care Manager's contact information for any further questions, concerns, or needs.   Patients upcoming visits:    Future Appointments   Date Time Provider Waldo Calvo   11/23/2022 12:45 PM Nancy Haque MD [facility] MAIN BS AMB   12/28/2022  1:00 PM DO RHONDA Marsh BS AMB

## 2022-10-07 DIAGNOSIS — D57.00 SICKLE CELL PAIN CRISIS (HCC): Primary | ICD-10-CM

## 2022-10-08 RX ORDER — FENTANYL 75 UG/H
1 PATCH TRANSDERMAL
Qty: 10 PATCH | Refills: 0 | Status: SHIPPED | OUTPATIENT
Start: 2022-10-08 | End: 2022-11-07

## 2022-10-08 RX ORDER — HYDROCODONE BITARTRATE AND ACETAMINOPHEN 10; 325 MG/1; MG/1
1 TABLET ORAL
Qty: 120 TABLET | Refills: 0 | Status: SHIPPED | OUTPATIENT
Start: 2022-10-08 | End: 2022-11-07

## 2022-10-28 DIAGNOSIS — D57.00 SICKLE CELL CRISIS (HCC): Primary | ICD-10-CM

## 2022-10-28 RX ORDER — HYDROXYUREA 500 MG/1
CAPSULE ORAL
Qty: 60 CAPSULE | Refills: 11 | Status: SHIPPED | OUTPATIENT
Start: 2022-10-28 | End: 2022-10-28

## 2022-10-28 RX ORDER — HYDROXYUREA 500 MG/1
500 CAPSULE ORAL 2 TIMES DAILY
Qty: 60 CAPSULE | Refills: 11 | Status: SHIPPED | OUTPATIENT
Start: 2022-10-28

## 2022-10-28 RX ORDER — HYDROXYUREA 500 MG/1
500 CAPSULE ORAL 2 TIMES DAILY
Qty: 60 CAPSULE | Refills: 11 | Status: SHIPPED | OUTPATIENT
Start: 2022-10-28 | End: 2022-10-28

## 2022-11-10 DIAGNOSIS — D57.00 SICKLE CELL CRISIS (HCC): Primary | ICD-10-CM

## 2022-11-10 RX ORDER — HYDROCODONE BITARTRATE AND ACETAMINOPHEN 10; 325 MG/1; MG/1
1 TABLET ORAL
Qty: 120 TABLET | Refills: 0 | Status: SHIPPED | OUTPATIENT
Start: 2022-11-10 | End: 2022-12-10

## 2022-12-15 DIAGNOSIS — D57.00 SICKLE CELL PAIN CRISIS (HCC): Primary | ICD-10-CM

## 2022-12-15 RX ORDER — HYDROCODONE BITARTRATE AND ACETAMINOPHEN 10; 325 MG/1; MG/1
1 TABLET ORAL
Qty: 120 TABLET | Refills: 0 | Status: SHIPPED | OUTPATIENT
Start: 2022-12-15 | End: 2023-01-14

## 2023-01-04 ENCOUNTER — OFFICE VISIT (OUTPATIENT)
Dept: INTERNAL MEDICINE CLINIC | Age: 63
End: 2023-01-04
Payer: MEDICARE

## 2023-01-04 VITALS
OXYGEN SATURATION: 97 % | BODY MASS INDEX: 28 KG/M2 | TEMPERATURE: 98.2 F | DIASTOLIC BLOOD PRESSURE: 90 MMHG | RESPIRATION RATE: 16 BRPM | SYSTOLIC BLOOD PRESSURE: 164 MMHG | HEIGHT: 70 IN | HEART RATE: 96 BPM | WEIGHT: 195.6 LBS

## 2023-01-04 DIAGNOSIS — G93.40 ENCEPHALOPATHY ACUTE: ICD-10-CM

## 2023-01-04 DIAGNOSIS — D57.00 SICKLE CELL DISEASE WITH CRISIS (HCC): ICD-10-CM

## 2023-01-04 DIAGNOSIS — I10 PRIMARY HYPERTENSION: Primary | ICD-10-CM

## 2023-01-04 DIAGNOSIS — Z00.00 WELLNESS EXAMINATION: ICD-10-CM

## 2023-01-04 DIAGNOSIS — Z13.31 SCREENING FOR DEPRESSION: ICD-10-CM

## 2023-01-04 DIAGNOSIS — E66.3 OVERWEIGHT: ICD-10-CM

## 2023-01-04 PROCEDURE — 3017F COLORECTAL CA SCREEN DOC REV: CPT | Performed by: INTERNAL MEDICINE

## 2023-01-04 PROCEDURE — G8419 CALC BMI OUT NRM PARAM NOF/U: HCPCS | Performed by: INTERNAL MEDICINE

## 2023-01-04 PROCEDURE — G9717 DOC PT DX DEP/BP F/U NT REQ: HCPCS | Performed by: INTERNAL MEDICINE

## 2023-01-04 PROCEDURE — G9899 SCRN MAM PERF RSLTS DOC: HCPCS | Performed by: INTERNAL MEDICINE

## 2023-01-04 PROCEDURE — 3080F DIAST BP >= 90 MM HG: CPT | Performed by: INTERNAL MEDICINE

## 2023-01-04 PROCEDURE — G0439 PPPS, SUBSEQ VISIT: HCPCS | Performed by: INTERNAL MEDICINE

## 2023-01-04 PROCEDURE — 99214 OFFICE O/P EST MOD 30 MIN: CPT | Performed by: INTERNAL MEDICINE

## 2023-01-04 PROCEDURE — 3077F SYST BP >= 140 MM HG: CPT | Performed by: INTERNAL MEDICINE

## 2023-01-04 PROCEDURE — G8427 DOCREV CUR MEDS BY ELIG CLIN: HCPCS | Performed by: INTERNAL MEDICINE

## 2023-01-04 NOTE — PROGRESS NOTES
Calvin Echevarria is a 63 y.o. female  Chief Complaint   Patient presents with    Annual Wellness Visit     Patient here for Annual Wellness Exam.    1. Have you been to the ER, urgent care clinic since your last visit? Hospitalized since your last visit? No    2. Have you seen or consulted any other health care providers outside of the [address] [place] [address] since your last visit? Include any pap smears or colon screening.  No

## 2023-01-05 NOTE — PATIENT INSTRUCTIONS
Medicare Wellness Visit, Female     The best way to live healthy is to have a lifestyle where you eat a well-balanced diet, exercise regularly, limit alcohol use, and quit all forms of tobacco/nicotine, if applicable. Regular preventive services are another way to keep healthy. Preventive services (vaccines, screening tests, monitoring & exams) can help personalize your care plan, which helps you manage your own care. Screening tests can find health problems at the earliest stages, when they are easiest to treat. [facility] follows the current, evidence-based guidelines published by the [facility] ([facility]) when recommending preventive services for our patients. Because we follow these guidelines, sometimes recommendations change over time as research supports it. (For example, mammograms used to be recommended annually. Even though Medicare will still pay for an annual mammogram, the newer guidelines recommend a mammogram every two years for women of average risk). Of course, you and your doctor may decide to screen more often for some diseases, based on your risk and your co-morbidities (chronic disease you are already diagnosed with). Preventive services for you include:  - Medicare offers their members a free annual wellness visit, which is time for you and your primary care provider to discuss and plan for your preventive service needs.  Take advantage of this benefit every year!    -Over the age of 72 should receive the recommended pneumonia vaccines.    -All adults should have a flu vaccine yearly.  -All adults should have a tetanus vaccine every 10 years.   -Over the age 48 should receive the shingles vaccines.        -All adults should be screened once for Hepatitis C.  -All adults age 38-68 who are overweight should have a diabetes screening test once every three years.   -Other screening tests and preventive services for persons with diabetes include: an eye exam to screen for diabetic retinopathy, a kidney function test, a foot exam, and stricter control over your cholesterol.   -Cardiovascular screening for adults with routine risk involves an electrocardiogram (ECG) at intervals determined by your doctor.     -Colorectal cancer screenings should be done for adults age 39-70 with no increased risk factors for colorectal cancer. There are a number of acceptable methods of screening for this type of cancer. Each test has its own benefits and drawbacks. Discuss with your doctor what is most appropriate for you during your annual wellness visit. The different tests include: colonoscopy (considered the best screening method), a fecal occult blood test, a fecal DNA test, and sigmoidoscopy.    -Lung cancer screening is recommended annually with a low dose CT scan for adults between age 54 and 68, who have smoked at least 30 pack years (equivalent of 1 pack per day for 30 days), and who is a current smoker or quit less than 15 years ago.    -A bone mass density test is recommended when a woman turns 65 to screen for osteoporosis. This test is only recommended one time, as a screening. Some providers will use this same test as a disease monitoring tool if you already have osteoporosis. -Breast cancer screenings are recommended every other year for women of normal risk, age 54-69.    -Cervical cancer screenings for women over age 72 are only recommended with certain risk factors.      Here is a list of your current Health Maintenance items (your personalized list of preventive services) with a due date:  Health Maintenance Due   Topic Date Due    Pneumococcal Vaccine (1 - PCV) Never done    Shingles Vaccine (1 of 2) Never done    Cervical cancer screen  Never done    Mammogram  05/03/2020    COVID-19 Vaccine (3 - Booster for Pfizer series) 06/14/2021    Yearly Flu Vaccine (1) 08/01/2022

## 2023-01-05 NOTE — PROGRESS NOTES
comes in for return visit stating that she has done reasonably well. Her sickle cell disease is quite stable with use of the current analgesics. She has not been hospitalized in well over six plus months. She did see the neurologist regarding her encephalopathic episode and the work up was negative. She had an evaluation by a neuro-psychologist with subsequent examination. There is no evidence of dementia and we felt basically that she was quite stable. She has a past history of primary hypertension, mild dyslipidemia and depression; although she states that her depression is doing quite well.

## 2023-01-05 NOTE — PROGRESS NOTES
[facility]  [place] [address]  [address]  [address] [address]  Phone:  [phone]  Fax: [phone]       Chief Complaint   Patient presents with    Annual Wellness Visit     Patient here for Annual Wellness Exam.    .      SUBJECTIVE:    Petty Brandon is a 63 y.o. female  Dictation on: 01/04/2023  9:36 PM by: Shayy Cobian [[id number]]          Current Outpatient Medications   Medication Sig Dispense Refill    HYDROcodone-acetaminophen (NORCO)  mg tablet Take 1 Tablet by mouth every six (6) hours as needed for Pain for up to 30 days. Max Daily Amount: 4 Tablets. Dx.57.00 120 Tablet 0    hydroxyurea (HYDREA) 500 mg capsule Take 1 Capsule by mouth two (2) times a day. 60 Capsule 11    promethazine (PHENERGAN) 25 mg tablet TAKE 1 TABLET BY MOUTH EVERY 8 HOURS AS NEEDED FOR NAUSEA 90 Tablet 11    telmisartan (MICARDIS) 40 mg tablet TAKE 1 TABLET BY MOUTH EVERY NIGHT 30 Tablet 11    folic acid (FOLVITE) 1 mg tablet TAKE 1 TABLET BY MOUTH DAILY 30 Tablet 11    FLUoxetine (PROzac) 10 mg capsule TAKE 1 CAPSULE BY MOUTH DAILY 30 Capsule 11    ondansetron (Zofran ODT) 4 mg disintegrating tablet Take 1 Tablet by mouth every eight (8) hours as needed for Nausea. 10 Tablet 0    hydrocortisone (Cortisone) 1 % topical cream Apply  to affected area two (2) times daily as needed for Skin Irritation. use thin layer 30 g 11    omeprazole (PRILOSEC OTC) 20 mg tablet Take 20 mg by mouth nightly.        Past Medical History:   Diagnosis Date    Anemia     SC hemoglobinopathy    Chronic pain     Depression     Hypertension     Liver disease     hepatitis C; pt reports \"cured\"    Sickle cell disease ([facility] [address]      Past Surgical History:   Procedure Laterality Date    HX BREAST BIOPSY Right 05/2007    negative    HX CHOLECYSTECTOMY      HX ORTHOPAEDIC      bony curettage of an ostial myelitis focus    [place] UNLISTED PROCEDURE BREAST      2 cysts removed from R breast    [place] UNLISTED PROCEDURE LUNGS & PLEURA status post thor for removal of a benign      Allergies   Allergen Reactions    Dilaudid [Hydromorphone (Bulk)] Itching     Can tolerate with benadryl and ondansetron    Percocet [Oxycodone-Acetaminophen] Itching         REVIEW OF SYSTEMS:  General: negative for - chills or fever  ENT: negative for - headaches, nasal congestion or tinnitus  Respiratory: negative for - cough, hemoptysis, shortness of breath or wheezing  Cardiovascular : negative for - chest pain, edema, palpitations or shortness of breath  Gastrointestinal: negative for - abdominal pain, blood in stools, heartburn or nausea/vomiting  Genito-Urinary: no dysuria, trouble voiding, or hematuria  Musculoskeletal: negative for - gait disturbance, joint pain, joint stiffness or joint swelling  Neurological: no TIA or stroke symptoms  Hematologic: no bruises, no bleeding, no swollen glands  Integument: no lumps, mole changes, nail changes or rash  Endocrine: no malaise/lethargy or unexpected weight changes      Social History     Socioeconomic History    Marital status:     Number of children: 2   Occupational History    Occupation: disabled---Sickle cell disease   Tobacco Use    Smoking status: Never    Smokeless tobacco: Never   Vaping Use    Vaping Use: Never used   Substance and Sexual Activity    Alcohol use: No    Drug use: No    Sexual activity: Yes     Partners: Male     Social Determinants of Health     Food Insecurity: No Food Insecurity    Worried About Running Out of Food in the Last Year: Never true    Ran Out of Food in the Last Year: Never true   Stress: No Stress Concern Present    Feeling of Stress : Not at all   Housing Stability: Unknown    Unable to Pay for Housing in the Last Year: No    Unstable Housing in the Last Year: No     Family History   Problem Relation Age of Onset    Hypertension Mother     Hypertension Father        OBJECTIVE:    Visit Vitals  BP (!) 164/90   Pulse 96   Temp 98.2 °F (36.8 °C) (Oral)   Resp 16   Ht 5' 10\" (1.778 m)   Wt 195 lb 9.6 oz (88.7 kg)   SpO2 97%   BMI 28.07 kg/m²     CONSTITUTIONAL: well , well nourished, appears age appropriate  EYES: perrla, eom intact  ENMT:moist mucous membranes, pharynx clear  NECK: supple. Thyroid normal  RESPIRATORY: Chest: clear to ascultation and percussion   CARDIOVASCULAR: Heart: regular rate and rhythm  GASTROINTESTINAL: Abdomen: soft, bowel sounds active  HEMATOLOGIC: no pathological lymph nodes palpated  MUSCULOSKELETAL: Extremities: no edema, pulse 1+   INTEGUMENT: No unusual rashes or suspicious skin lesions noted. Nails appear normal.  NEUROLOGIC: non-focal exam   MENTAL STATUS: alert and oriented, appropriate affect      ASSESSMENT:  1. Primary hypertension    2. Encephalopathy acute    3. Overweight    4. Sickle cell disease with crisis [facility])        PLAN:   Dictation on: 01/04/2023  9:38 PM by: Ryley Padilla MD   This is the Subsequent Medicare Annual Wellness Exam, performed 12 months or more after the Initial AWV or the last Subsequent AWV    I have reviewed the patient's medical history in detail and updated the computerized patient record. Assessment/Plan   Education and counseling provided:  Are appropriate based on today's review and evaluation    1. Primary hypertension  2. Encephalopathy acute  3. Overweight  4.  Sickle cell disease with crisis ([facility] [address]       Depression Risk Factor Screening     3 most recent PHQ Screens 1/4/2023   PHQ Not Done -   Little interest or pleasure in doing things Several days   Feeling down, depressed, irritable, or hopeless Several days   Total Score PHQ 2 2   Trouble falling or staying asleep, or sleeping too much Not at all   Feeling tired or having little energy Not at all   Poor appetite, weight loss, or overeating Not at all   Feeling bad about yourself - or that you are a failure or have let yourself or your family down Not at all   Trouble concentrating on things such as school, work, reading, or watching TV Not at all   Moving or speaking so slowly that other people could have noticed; or the opposite being so fidgety that others notice Not at all   Thoughts of being better off dead, or hurting yourself in some way Not at all   PHQ 9 Score 2   How difficult have these problems made it for you to do your work, take care of your home and get along with others Not difficult at all       Alcohol & Drug Abuse Risk Screen    Do you average more than 1 drink per night or more than 7 drinks a week:  No    On any one occasion in the past three months have you have had more than 3 drinks containing alcohol:  No       Opioid Risk: (Low risk score <55, High risk score ?55)  Opioid risk score: 14      Click here to complete the Controlled Substance Monitoring SmartForm    Last PDMP Kenny as Reviewed:  Review User Review Instant Review Result            Functional Ability and Level of Safety    Hearing: Hearing is good. Activities of Daily Living: The home contains: no safety equipment. Patient does total self care      Ambulation: with no difficulty     Fall Risk:  No flowsheet data found. Abuse Screen:  Patient is not abused       Cognitive Screening    Has your family/caregiver stated any concerns about your memory: no     Cognitive Screening: Not applicable. Health Maintenance Due     Health Maintenance Due   Topic Date Due    Pneumococcal 0-64 years (1 - PCV) Never done    Shingles Vaccine (1 of 2) Never done    Cervical cancer screen  Never done    Breast Cancer Screen Mammogram  05/03/2020    COVID-19 Vaccine (3 - Booster for Garsia Peter series) 06/14/2021    Flu Vaccine (1) 08/01/2022       Patient Care Team   Patient Care Team:  Santo Moore MD as PCP - General (Internal Medicine Physician)  Santo Moore MD as PCP - REHABILITATION [facility] Empaneled Provider    History     Patient Active Problem List   Diagnosis Code    Hypertension I10    Venous insufficiency I87.2    Depression F32. A    Sickle cell crisis ([facility] [address] D57.00    Pulsatile tinnitus H93. A9    Carpal tunnel syndrome of right wrist G56.01    Sickle cell pain crisis (HCC) D57.00    Overweight E66.3    Sickle cell disease (HCC) D57.1    Class 1 obesity due to excess calories with serious comorbidity and body mass index (BMI) of 30.0 to 30.9 in adult E66.09, Z68.30    AVN of femur (HCC) M87.059    Viral gastritis K29.70    Encephalopathy acute G93.40     Past Medical History:   Diagnosis Date    Anemia     SC hemoglobinopathy    Chronic pain     Depression     Hypertension     Liver disease     hepatitis C; pt reports \"cured\"    Sickle cell disease ([facility] [address]       Past Surgical History:   Procedure Laterality Date    HX BREAST BIOPSY Right 05/2007    negative    HX CHOLECYSTECTOMY      HX ORTHOPAEDIC      bony curettage of an ostial myelitis focus    CT UNLISTED PROCEDURE BREAST      2 cysts removed from R breast    CT UNLISTED PROCEDURE LUNGS & PLEURA      status post thor for removal of a benign      Current Outpatient Medications   Medication Sig Dispense Refill    HYDROcodone-acetaminophen (NORCO)  mg tablet Take 1 Tablet by mouth every six (6) hours as needed for Pain for up to 30 days. Max Daily Amount: 4 Tablets. Dx.57.00 120 Tablet 0    hydroxyurea (HYDREA) 500 mg capsule Take 1 Capsule by mouth two (2) times a day. 60 Capsule 11    promethazine (PHENERGAN) 25 mg tablet TAKE 1 TABLET BY MOUTH EVERY 8 HOURS AS NEEDED FOR NAUSEA 90 Tablet 11    telmisartan (MICARDIS) 40 mg tablet TAKE 1 TABLET BY MOUTH EVERY NIGHT 30 Tablet 11    folic acid (FOLVITE) 1 mg tablet TAKE 1 TABLET BY MOUTH DAILY 30 Tablet 11    FLUoxetine (PROzac) 10 mg capsule TAKE 1 CAPSULE BY MOUTH DAILY 30 Capsule 11    ondansetron (Zofran ODT) 4 mg disintegrating tablet Take 1 Tablet by mouth every eight (8) hours as needed for Nausea. 10 Tablet 0    hydrocortisone (Cortisone) 1 % topical cream Apply  to affected area two (2) times daily as needed for Skin Irritation.  use thin layer 30 g 11    omeprazole (PRILOSEC OTC) 20 mg tablet Take 20 mg by mouth nightly.        Allergies   Allergen Reactions    Dilaudid [Hydromorphone (Bulk)] Itching     Can tolerate with benadryl and ondansetron    Percocet [Oxycodone-Acetaminophen] Itching       Family History   Problem Relation Age of Onset    Hypertension Mother     Hypertension Father      Social History     Tobacco Use    Smoking status: Never    Smokeless tobacco: Never   Substance Use Topics    Alcohol use: No         Zonia Hernandez MD

## 2023-01-05 NOTE — PROGRESS NOTES
1.  Blood pressure is slightly elevated, but generally this normalizes. I will not intervene just yet. She will continue her current antihypertensive medication. 2. As far as her encephalopathic episodes are concerned, no obvious etiology has been found to date. Neuro-psychiatric testing was negative and did not reveal any overt CNS pathology. Nothing more needs to be done for now. 3. Her weight is doing reasonably well. She has lost and I congratulated her on this. 4. Her sickle cell disease is doing reasonably well and she will continue analgesics as prescribed.

## 2023-01-13 DIAGNOSIS — D57.00 SICKLE CELL PAIN CRISIS (HCC): ICD-10-CM

## 2023-01-13 RX ORDER — FENTANYL 75 UG/H
1 PATCH TRANSDERMAL
Qty: 10 PATCH | Refills: 0 | Status: SHIPPED | OUTPATIENT
Start: 2023-01-13 | End: 2023-02-12

## 2023-01-30 DIAGNOSIS — D57.00 SICKLE CELL CRISIS (HCC): Primary | ICD-10-CM

## 2023-01-30 RX ORDER — HYDROCODONE BITARTRATE AND ACETAMINOPHEN 10; 325 MG/1; MG/1
1 TABLET ORAL
Qty: 120 TABLET | Refills: 0 | Status: SHIPPED | OUTPATIENT
Start: 2023-01-30 | End: 2023-03-01

## 2023-02-24 RX ORDER — FLUOXETINE 10 MG/1
10 CAPSULE ORAL DAILY
Qty: 30 CAPSULE | Refills: 11 | Status: SHIPPED | OUTPATIENT
Start: 2023-02-24

## 2023-03-02 DIAGNOSIS — D57.00 SICKLE CELL CRISIS (HCC): ICD-10-CM

## 2023-03-03 RX ORDER — HYDROCODONE BITARTRATE AND ACETAMINOPHEN 10; 325 MG/1; MG/1
1 TABLET ORAL
Qty: 120 TABLET | Refills: 0 | Status: SHIPPED | OUTPATIENT
Start: 2023-03-03 | End: 2023-04-02

## 2023-03-23 DIAGNOSIS — D57.00 SICKLE CELL CRISIS (HCC): Primary | ICD-10-CM

## 2023-03-24 RX ORDER — FENTANYL 75 UG/H
1 PATCH TRANSDERMAL
Qty: 10 PATCH | Refills: 0 | Status: SHIPPED | OUTPATIENT
Start: 2023-03-24 | End: 2023-04-23

## 2023-03-28 DIAGNOSIS — D57.00 SICKLE CELL CRISIS (HCC): ICD-10-CM

## 2023-03-28 RX ORDER — FENTANYL 75 UG/H
1 PATCH TRANSDERMAL
Qty: 10 PATCH | Refills: 0 | Status: SHIPPED | OUTPATIENT
Start: 2023-03-28 | End: 2023-04-27

## 2023-04-03 DIAGNOSIS — D57.00 SICKLE CELL CRISIS (HCC): ICD-10-CM

## 2023-04-03 RX ORDER — HYDROCODONE BITARTRATE AND ACETAMINOPHEN 10; 325 MG/1; MG/1
1 TABLET ORAL
Qty: 120 TABLET | Refills: 0 | Status: SHIPPED | OUTPATIENT
Start: 2023-04-03 | End: 2023-05-03

## 2023-04-03 RX ORDER — FOLIC ACID 1 MG/1
TABLET ORAL
Qty: 30 TABLET | Refills: 11 | Status: SHIPPED | OUTPATIENT
Start: 2023-04-03

## 2023-05-25 DIAGNOSIS — D57.00 SICKLE CELL PAIN CRISIS (HCC): Primary | ICD-10-CM

## 2023-05-26 RX ORDER — HYDROCODONE BITARTRATE AND ACETAMINOPHEN 10; 325 MG/1; MG/1
1 TABLET ORAL EVERY 6 HOURS PRN
Qty: 120 TABLET | Refills: 0 | Status: SHIPPED | OUTPATIENT
Start: 2023-05-26 | End: 2023-06-25

## 2023-05-30 NOTE — PROGRESS NOTES
Problem: Falls - Risk of  Goal: *Absence of Falls  Document Mukund Fall Risk and appropriate interventions in the flowsheet.    Outcome: Progressing Towards Goal  Fall Risk Interventions:            Medication Interventions: Bed/chair exit alarm, Patient to call before getting OOB Negative

## 2023-06-17 PROBLEM — Z78.9 HEPATITIS C ANTIBODY TEST NEGATIVE: Status: ACTIVE | Noted: 2023-06-17

## 2023-06-17 PROBLEM — K22.2 ESOPHAGEAL STRICTURE: Status: ACTIVE | Noted: 2023-06-17

## 2023-06-20 DIAGNOSIS — D57.00 SICKLE CELL PAIN CRISIS (HCC): Primary | ICD-10-CM

## 2023-06-20 RX ORDER — FENTANYL 75 UG/H
1 PATCH TRANSDERMAL
Qty: 10 PATCH | Refills: 0 | Status: SHIPPED | OUTPATIENT
Start: 2023-06-20 | End: 2023-07-20

## 2023-06-29 DIAGNOSIS — D57.00 SICKLE CELL PAIN CRISIS (HCC): Primary | ICD-10-CM

## 2023-06-29 RX ORDER — HYDROCODONE BITARTRATE AND ACETAMINOPHEN 10; 325 MG/1; MG/1
1 TABLET ORAL EVERY 6 HOURS PRN
Qty: 120 TABLET | Refills: 0 | Status: SHIPPED | OUTPATIENT
Start: 2023-06-29 | End: 2023-07-29

## 2023-07-31 DIAGNOSIS — D57.00 SICKLE CELL DISEASE WITH CRISIS (HCC): Primary | ICD-10-CM

## 2023-08-01 RX ORDER — HYDROCODONE BITARTRATE AND ACETAMINOPHEN 10; 325 MG/1; MG/1
1 TABLET ORAL EVERY 6 HOURS PRN
Qty: 120 TABLET | Refills: 0 | Status: SHIPPED | OUTPATIENT
Start: 2023-08-01 | End: 2023-08-31

## 2023-08-10 ENCOUNTER — HOSPITAL ENCOUNTER (INPATIENT)
Facility: HOSPITAL | Age: 63
LOS: 8 days | Discharge: HOME | End: 2023-08-18
Attending: EMERGENCY MEDICINE | Admitting: INTERNAL MEDICINE
Payer: MEDICARE

## 2023-08-10 ENCOUNTER — APPOINTMENT (OUTPATIENT)
Facility: HOSPITAL | Age: 63
End: 2023-08-10
Payer: MEDICARE

## 2023-08-10 DIAGNOSIS — D57.00 SICKLE CELL DISEASE WITH CRISIS (HCC): Primary | ICD-10-CM

## 2023-08-10 DIAGNOSIS — D57.09 SICKLE CELL DISEASE WITH CRISIS AND OTHER COMPLICATION (HCC): ICD-10-CM

## 2023-08-10 LAB
ALBUMIN SERPL-MCNC: 3.5 G/DL (ref 3.5–5)
ALBUMIN/GLOB SERPL: 0.8 (ref 1.1–2.2)
ALP SERPL-CCNC: 76 U/L (ref 45–117)
ALT SERPL-CCNC: 15 U/L (ref 12–78)
ANION GAP SERPL CALC-SCNC: 4 MMOL/L (ref 5–15)
AST SERPL-CCNC: 29 U/L (ref 15–37)
BASOPHILS # BLD: 0.1 K/UL (ref 0–0.1)
BASOPHILS NFR BLD: 1 % (ref 0–1)
BILIRUB SERPL-MCNC: 1.2 MG/DL (ref 0.2–1)
BUN SERPL-MCNC: 10 MG/DL (ref 6–20)
BUN/CREAT SERPL: 13 (ref 12–20)
CALCIUM SERPL-MCNC: 8.4 MG/DL (ref 8.5–10.1)
CHLORIDE SERPL-SCNC: 108 MMOL/L (ref 97–108)
CO2 SERPL-SCNC: 27 MMOL/L (ref 21–32)
COMMENT:: NORMAL
CREAT SERPL-MCNC: 0.76 MG/DL (ref 0.55–1.02)
DIFFERENTIAL METHOD BLD: ABNORMAL
EOSINOPHIL # BLD: 0.1 K/UL (ref 0–0.4)
EOSINOPHIL NFR BLD: 2 % (ref 0–7)
ERYTHROCYTE [DISTWIDTH] IN BLOOD BY AUTOMATED COUNT: 13.9 % (ref 11.5–14.5)
GLOBULIN SER CALC-MCNC: 4.4 G/DL (ref 2–4)
GLUCOSE BLD STRIP.AUTO-MCNC: 92 MG/DL (ref 65–117)
GLUCOSE SERPL-MCNC: 93 MG/DL (ref 65–100)
HCT VFR BLD AUTO: 22.2 % (ref 35–47)
HGB BLD-MCNC: 7.8 G/DL (ref 11.5–16)
IMM GRANULOCYTES # BLD AUTO: 0.1 K/UL (ref 0–0.04)
IMM GRANULOCYTES NFR BLD AUTO: 1 % (ref 0–0.5)
LYMPHOCYTES # BLD: 2.5 K/UL (ref 0.8–3.5)
LYMPHOCYTES NFR BLD: 39 % (ref 12–49)
MAGNESIUM SERPL-MCNC: 2.1 MG/DL (ref 1.6–2.4)
MCH RBC QN AUTO: 37.5 PG (ref 26–34)
MCHC RBC AUTO-ENTMCNC: 35.1 G/DL (ref 30–36.5)
MCV RBC AUTO: 106.7 FL (ref 80–99)
MONOCYTES # BLD: 0.8 K/UL (ref 0–1)
MONOCYTES NFR BLD: 13 % (ref 5–13)
NEUTS SEG # BLD: 2.7 K/UL (ref 1.8–8)
NEUTS SEG NFR BLD: 44 % (ref 32–75)
NRBC # BLD: 1.18 K/UL (ref 0–0.01)
NRBC BLD-RTO: 18.8 PER 100 WBC
PLATELET # BLD AUTO: 245 K/UL (ref 150–400)
PMV BLD AUTO: 11.2 FL (ref 8.9–12.9)
POTASSIUM SERPL-SCNC: 3.7 MMOL/L (ref 3.5–5.1)
PROT SERPL-MCNC: 7.9 G/DL (ref 6.4–8.2)
RBC # BLD AUTO: 2.08 M/UL (ref 3.8–5.2)
RBC MORPH BLD: ABNORMAL
RBC MORPH BLD: ABNORMAL
RETICS # AUTO: 0.12 M/UL (ref 0.02–0.08)
RETICS/RBC NFR AUTO: 5.6 % (ref 0.7–2.1)
SERVICE CMNT-IMP: NORMAL
SODIUM SERPL-SCNC: 139 MMOL/L (ref 136–145)
SPECIMEN HOLD: NORMAL
WBC # BLD AUTO: 6.3 K/UL (ref 3.6–11)

## 2023-08-10 PROCEDURE — 6370000000 HC RX 637 (ALT 250 FOR IP): Performed by: NURSE PRACTITIONER

## 2023-08-10 PROCEDURE — 1100000000 HC RM PRIVATE

## 2023-08-10 PROCEDURE — 96376 TX/PRO/DX INJ SAME DRUG ADON: CPT

## 2023-08-10 PROCEDURE — 82962 GLUCOSE BLOOD TEST: CPT

## 2023-08-10 PROCEDURE — 2580000003 HC RX 258: Performed by: NURSE PRACTITIONER

## 2023-08-10 PROCEDURE — 99285 EMERGENCY DEPT VISIT HI MDM: CPT

## 2023-08-10 PROCEDURE — 80053 COMPREHEN METABOLIC PANEL: CPT

## 2023-08-10 PROCEDURE — 83735 ASSAY OF MAGNESIUM: CPT

## 2023-08-10 PROCEDURE — 6360000002 HC RX W HCPCS: Performed by: EMERGENCY MEDICINE

## 2023-08-10 PROCEDURE — 36415 COLL VENOUS BLD VENIPUNCTURE: CPT

## 2023-08-10 PROCEDURE — 2500000003 HC RX 250 WO HCPCS: Performed by: NURSE PRACTITIONER

## 2023-08-10 PROCEDURE — 85025 COMPLETE CBC W/AUTO DIFF WBC: CPT

## 2023-08-10 PROCEDURE — 96374 THER/PROPH/DIAG INJ IV PUSH: CPT

## 2023-08-10 PROCEDURE — 2580000003 HC RX 258: Performed by: EMERGENCY MEDICINE

## 2023-08-10 PROCEDURE — 71045 X-RAY EXAM CHEST 1 VIEW: CPT

## 2023-08-10 PROCEDURE — 96375 TX/PRO/DX INJ NEW DRUG ADDON: CPT

## 2023-08-10 PROCEDURE — 85045 AUTOMATED RETICULOCYTE COUNT: CPT

## 2023-08-10 RX ORDER — HYDROCODONE BITARTRATE AND ACETAMINOPHEN 5; 325 MG/1; MG/1
1 TABLET ORAL EVERY 6 HOURS PRN
Status: DISCONTINUED | OUTPATIENT
Start: 2023-08-10 | End: 2023-08-11

## 2023-08-10 RX ORDER — FLUOXETINE 10 MG/1
10 CAPSULE ORAL DAILY
Status: DISCONTINUED | OUTPATIENT
Start: 2023-08-10 | End: 2023-08-18 | Stop reason: HOSPADM

## 2023-08-10 RX ORDER — ONDANSETRON 4 MG/1
4 TABLET, ORALLY DISINTEGRATING ORAL EVERY 8 HOURS PRN
Status: DISCONTINUED | OUTPATIENT
Start: 2023-08-10 | End: 2023-08-18 | Stop reason: HOSPADM

## 2023-08-10 RX ORDER — FOLIC ACID 1 MG/1
1000 TABLET ORAL DAILY
Status: DISCONTINUED | OUTPATIENT
Start: 2023-08-10 | End: 2023-08-18 | Stop reason: HOSPADM

## 2023-08-10 RX ORDER — HYDROXYUREA 500 MG/1
500 CAPSULE ORAL 2 TIMES DAILY
Status: DISCONTINUED | OUTPATIENT
Start: 2023-08-10 | End: 2023-08-18 | Stop reason: HOSPADM

## 2023-08-10 RX ORDER — ONDANSETRON 2 MG/ML
4 INJECTION INTRAMUSCULAR; INTRAVENOUS EVERY 6 HOURS PRN
Status: DISCONTINUED | OUTPATIENT
Start: 2023-08-10 | End: 2023-08-17 | Stop reason: SDUPTHER

## 2023-08-10 RX ORDER — MORPHINE SULFATE 4 MG/ML
4 INJECTION, SOLUTION INTRAMUSCULAR; INTRAVENOUS
Status: COMPLETED | OUTPATIENT
Start: 2023-08-10 | End: 2023-08-10

## 2023-08-10 RX ORDER — ONDANSETRON 2 MG/ML
4 INJECTION INTRAMUSCULAR; INTRAVENOUS EVERY 4 HOURS PRN
Status: DISCONTINUED | OUTPATIENT
Start: 2023-08-10 | End: 2023-08-18 | Stop reason: HOSPADM

## 2023-08-10 RX ORDER — PROMETHAZINE HYDROCHLORIDE 25 MG/1
25 TABLET ORAL EVERY 8 HOURS PRN
Status: DISCONTINUED | OUTPATIENT
Start: 2023-08-10 | End: 2023-08-18 | Stop reason: HOSPADM

## 2023-08-10 RX ORDER — LANOLIN ALCOHOL/MO/W.PET/CERES
1000 CREAM (GRAM) TOPICAL DAILY
COMMUNITY

## 2023-08-10 RX ORDER — POLYETHYLENE GLYCOL 3350 17 G/17G
17 POWDER, FOR SOLUTION ORAL DAILY PRN
Status: DISCONTINUED | OUTPATIENT
Start: 2023-08-10 | End: 2023-08-18 | Stop reason: HOSPADM

## 2023-08-10 RX ORDER — DIPHENHYDRAMINE HYDROCHLORIDE 50 MG/ML
25 INJECTION INTRAMUSCULAR; INTRAVENOUS
Status: COMPLETED | OUTPATIENT
Start: 2023-08-10 | End: 2023-08-10

## 2023-08-10 RX ORDER — SODIUM CHLORIDE 9 MG/ML
INJECTION, SOLUTION INTRAVENOUS CONTINUOUS
Status: DISCONTINUED | OUTPATIENT
Start: 2023-08-10 | End: 2023-08-11

## 2023-08-10 RX ORDER — SENNA AND DOCUSATE SODIUM 50; 8.6 MG/1; MG/1
2 TABLET, FILM COATED ORAL DAILY PRN
Status: DISCONTINUED | OUTPATIENT
Start: 2023-08-10 | End: 2023-08-18 | Stop reason: HOSPADM

## 2023-08-10 RX ORDER — CHOLECALCIFEROL (VITAMIN D3) 125 MCG
1000 CAPSULE ORAL DAILY
Status: DISCONTINUED | OUTPATIENT
Start: 2023-08-10 | End: 2023-08-18 | Stop reason: HOSPADM

## 2023-08-10 RX ORDER — MORPHINE SULFATE 4 MG/ML
8 INJECTION, SOLUTION INTRAMUSCULAR; INTRAVENOUS
Status: COMPLETED | OUTPATIENT
Start: 2023-08-10 | End: 2023-08-10

## 2023-08-10 RX ORDER — LOSARTAN POTASSIUM 50 MG/1
50 TABLET ORAL DAILY
Status: DISCONTINUED | OUTPATIENT
Start: 2023-08-10 | End: 2023-08-13

## 2023-08-10 RX ORDER — PANTOPRAZOLE SODIUM 40 MG/1
40 TABLET, DELAYED RELEASE ORAL
Status: DISCONTINUED | OUTPATIENT
Start: 2023-08-11 | End: 2023-08-18 | Stop reason: HOSPADM

## 2023-08-10 RX ORDER — MAGNESIUM HYDROXIDE/ALUMINUM HYDROXICE/SIMETHICONE 120; 1200; 1200 MG/30ML; MG/30ML; MG/30ML
30 SUSPENSION ORAL EVERY 6 HOURS PRN
Status: DISCONTINUED | OUTPATIENT
Start: 2023-08-10 | End: 2023-08-18 | Stop reason: HOSPADM

## 2023-08-10 RX ORDER — 0.9 % SODIUM CHLORIDE 0.9 %
1000 INTRAVENOUS SOLUTION INTRAVENOUS ONCE
Status: COMPLETED | OUTPATIENT
Start: 2023-08-10 | End: 2023-08-10

## 2023-08-10 RX ORDER — DIPHENHYDRAMINE HCL 25 MG
25 CAPSULE ORAL EVERY 6 HOURS PRN
Status: DISCONTINUED | OUTPATIENT
Start: 2023-08-10 | End: 2023-08-18 | Stop reason: HOSPADM

## 2023-08-10 RX ORDER — HYDROMORPHONE HYDROCHLORIDE 1 MG/ML
1 INJECTION, SOLUTION INTRAMUSCULAR; INTRAVENOUS; SUBCUTANEOUS EVERY 4 HOURS PRN
Status: DISCONTINUED | OUTPATIENT
Start: 2023-08-10 | End: 2023-08-11

## 2023-08-10 RX ORDER — ONDANSETRON 2 MG/ML
4 INJECTION INTRAMUSCULAR; INTRAVENOUS ONCE
Status: COMPLETED | OUTPATIENT
Start: 2023-08-10 | End: 2023-08-10

## 2023-08-10 RX ORDER — SODIUM CHLORIDE 9 MG/ML
125 INJECTION, SOLUTION INTRAVENOUS ONCE
Status: COMPLETED | OUTPATIENT
Start: 2023-08-10 | End: 2023-08-10

## 2023-08-10 RX ORDER — MORPHINE SULFATE 4 MG/ML
4 INJECTION, SOLUTION INTRAMUSCULAR; INTRAVENOUS EVERY 4 HOURS PRN
Status: DISCONTINUED | OUTPATIENT
Start: 2023-08-10 | End: 2023-08-10

## 2023-08-10 RX ADMIN — FOLIC ACID 1000 MCG: 1 TABLET ORAL at 14:47

## 2023-08-10 RX ADMIN — ONDANSETRON 4 MG: 2 INJECTION INTRAMUSCULAR; INTRAVENOUS at 16:01

## 2023-08-10 RX ADMIN — DIPHENHYDRAMINE HYDROCHLORIDE 25 MG: 25 CAPSULE ORAL at 16:00

## 2023-08-10 RX ADMIN — DIPHENHYDRAMINE HYDROCHLORIDE 25 MG: 25 CAPSULE ORAL at 22:23

## 2023-08-10 RX ADMIN — CYANOCOBALAMIN TAB 500 MCG 1000 MCG: 500 TAB at 14:47

## 2023-08-10 RX ADMIN — MORPHINE SULFATE 4 MG: 4 INJECTION INTRAVENOUS at 09:37

## 2023-08-10 RX ADMIN — HYDROMORPHONE HYDROCHLORIDE 1 MG: 1 INJECTION, SOLUTION INTRAMUSCULAR; INTRAVENOUS; SUBCUTANEOUS at 20:23

## 2023-08-10 RX ADMIN — LOSARTAN POTASSIUM 50 MG: 50 TABLET, FILM COATED ORAL at 14:47

## 2023-08-10 RX ADMIN — ONDANSETRON 4 MG: 2 INJECTION INTRAMUSCULAR; INTRAVENOUS at 22:23

## 2023-08-10 RX ADMIN — DIPHENHYDRAMINE HYDROCHLORIDE 25 MG: 50 INJECTION INTRAMUSCULAR; INTRAVENOUS at 09:36

## 2023-08-10 RX ADMIN — MORPHINE SULFATE 8 MG: 4 INJECTION, SOLUTION INTRAMUSCULAR; INTRAVENOUS at 10:46

## 2023-08-10 RX ADMIN — SODIUM CHLORIDE: 9 INJECTION, SOLUTION INTRAVENOUS at 15:57

## 2023-08-10 RX ADMIN — ONDANSETRON 4 MG: 2 INJECTION INTRAMUSCULAR; INTRAVENOUS at 09:32

## 2023-08-10 RX ADMIN — HYDROMORPHONE HYDROCHLORIDE 1 MG: 1 INJECTION, SOLUTION INTRAMUSCULAR; INTRAVENOUS; SUBCUTANEOUS at 14:47

## 2023-08-10 RX ADMIN — SODIUM CHLORIDE 125 ML/HR: 9 INJECTION, SOLUTION INTRAVENOUS at 12:30

## 2023-08-10 RX ADMIN — SODIUM CHLORIDE 1000 ML: 9 INJECTION, SOLUTION INTRAVENOUS at 09:33

## 2023-08-10 ASSESSMENT — PAIN SCALES - GENERAL
PAINLEVEL_OUTOF10: 10
PAINLEVEL_OUTOF10: 10
PAINLEVEL_OUTOF10: 8
PAINLEVEL_OUTOF10: 10
PAINLEVEL_OUTOF10: 6
PAINLEVEL_OUTOF10: 10
PAINLEVEL_OUTOF10: 10

## 2023-08-10 ASSESSMENT — ENCOUNTER SYMPTOMS
COLOR CHANGE: 0
DIARRHEA: 0
ABDOMINAL PAIN: 0
COUGH: 0
VOMITING: 0

## 2023-08-10 ASSESSMENT — PAIN DESCRIPTION - LOCATION
LOCATION: GROIN
LOCATION: GENERALIZED

## 2023-08-10 ASSESSMENT — PAIN DESCRIPTION - DESCRIPTORS: DESCRIPTORS: ACHING;DISCOMFORT

## 2023-08-10 NOTE — CARE COORDINATION
Care Management Initial Assessment       RUR: 9%  Readmission? No  1st IM letter given? Yes -  1st  letter given: No     08/10/23 4452   Service Assessment   Patient Orientation Alert and Oriented   Cognition Alert   History Provided By Patient   Primary [address] Family Members   Patient's Healthcare Decision Maker is: Named in [address]   PCP Verified by CM Yes   Last Visit to PCP Within last 3 months   Prior Functional Level Independent in ADLs/IADLs   Current Functional Level Independent in ADLs/IADLs   Can patient return to prior living arrangement Yes   Ability to make needs known: Good   Family able to assist with home care needs: Yes   Would you like for me to discuss the discharge plan with any other family members/significant others, and if so, who? Yes   Financial Resources Medicare   Social/Functional History   Lives With Alone   Type of Home House   Active  Yes   Mode of Transportation Car   Discharge Planning   Type of [address] Alone   Patient expects to be discharged to: [place]     CM completed room visit, for assessment. Pt reported that she resides alone in her one story home. Pt reported that she is active with PCP: seen every 3 months. Pt reported that she uses [facility]. Pt reported that she is independent with ADLs, and drive. Pt reported no DME, HHC and SNF      Pt currently wearing O2, and doesn't wear it at baseline. Pt will need to be weaned off O2, prior to d/c. CM will continue to follow.       ANASTASIIA Woody   [phone]

## 2023-08-10 NOTE — PROGRESS NOTES
.End of Shift Note    Bedside shift change report given to [address] [place] [address] Loop  (oncoming nurse) by Shannon Abdi RN (offgoing nurse). Report included the following information SBAR, Kardex, Intake/Output, MAR, Recent Results, and Med Rec Status    Shift worked:  6648-8504     Shift summary and any significant changes:     Pt given prescribed med's per MAR. Caring rounds completed. Benadryl once on shift.       Concerns for physician to address:  none     Zone phone for oncoming shift:  [address], RN

## 2023-08-10 NOTE — ED NOTES
Pt medicated per MD orders. IVF infusing, pt on monitor x 2. Call bell within reach.       Pb Yung RN  08/10/23 7556

## 2023-08-10 NOTE — ED NOTES
3447: USGIV needed at this time. Patient is a difficult stick and we are unable to gain IV access at this time.       Emeka Wallace RN  08/10/23 9049

## 2023-08-10 NOTE — ED PROVIDER NOTES
[facility] EMERGENCY DEPT  EMERGENCY DEPARTMENT ENCOUNTER       Pt Name: Alka Junior  MRN: [id number]  [address] [facility] 1960  Date of evaluation: 8/10/2023  Provider: Rishabh Good MD   PCP: Onelia Hernandez MD  Note Started: 2:39 PM 8/10/23     CHIEF COMPLAINT       Chief Complaint   Patient presents with    Sickle Cell Pain Crisis     Pt arrives ambulatory to triage, reports pain starting last night. Reports putting on a 75 mg fentanyl patch and taking a norco pta. HISTORY OF PRESENT ILLNESS: 1 or more elements      History From: Patient     Alka Junior is a 63 y.o. female who presents for sickle cell pain. Patient is patient doing really well at home with hydroxyurea controlling her sickle cell. She has not been to the ER for the past 2 years. However, yesterday she started with all of her joint pains specifically her hips and her elbows. She put on a fentanyl patch and is taking her Norco without relief of symptoms. She denies any fevers, chills, cough and shortness of breath with this episode     Nursing Notes were all reviewed and agreed with or any disagreements were addressed in the HPI. REVIEW OF SYSTEMS      Review of Systems   Constitutional:  Negative for activity change, appetite change, chills, fatigue and fever. HENT:  Negative for congestion. Eyes:  Negative for visual disturbance. Respiratory:  Negative for cough. Cardiovascular:  Negative for chest pain and leg swelling. Gastrointestinal:  Negative for abdominal pain, diarrhea and vomiting. Genitourinary:  Negative for dysuria. Musculoskeletal:  Positive for arthralgias. Negative for gait problem. Skin:  Negative for color change and rash. Neurological:  Negative for dizziness and weakness. Positives and Pertinent negatives as per HPI.     PAST HISTORY     Past Medical History:  Past Medical History:   Diagnosis Date    Anemia     SC hemoglobinopathy    Chronic pain     Depression     Hypertension     Liver

## 2023-08-10 NOTE — ED NOTES
TRANSFER - OUT REPORT:    Verbal report given to [place], [place] on Emma Webster  being transferred to [id number]  Oncology for routine progression of patient care       Report consisted of patient's Situation, Background, Assessment and   Recommendations(SBAR). Information from the following report(s) Nurse Handoff Report, Index, ED Encounter Summary, ED SBAR, Adult Overview, Intake/Output, MAR, and Recent Results was reviewed with the receiving nurse. [place] Fall Assessment:    Presents to emergency department  because of falls (Syncope, seizure, or loss of consciousness): No  Age > 70: No  Altered Mental Status, Intoxication with alcohol or substance confusion (Disorientation, impaired judgment, poor safety awaremess, or inability to follow instructions): No  Impaired Mobility: Ambulates or transfers with assistive devices or assistance; Unable to ambulate or transer.: No  Nursing Judgement: No          Lines:   Peripheral IV 85/26/11 Right Cephalic (Active)        Opportunity for questions and clarification was provided. Patient transported with:  Pt belongings , O2 2L NC, Tele - NSR.          Marielena Bravo RN  08/10/23 1300 [address]  08/10/23 1423

## 2023-08-10 NOTE — H&P
Hospitalist Admission Note    NAME:   Abhi Ha   : 1960   MRN: [id number]     Date/Time: 8/10/2023 1:12 PM    Patient PCP: Aj Bahena MD    ______________________________________________________________________  Given the patient's current clinical presentation, I have a high level of concern for decompensation if discharged from the emergency department. Complex decision making was performed, which includes reviewing the patient's available past medical records, laboratory results, and x-ray films. My assessment of this patient's clinical condition and my plan of care is as follows. Assessment / Plan:  Sickle Cell Crisis  - Hgb 7.8  - no active bleeding note or reported  - transfuse if Hgb <7  - continue home pain medications. Patient has taken dilaudid and hydrocodone for previous sickle crisis episodes without difficulty. Takes medications with benadryl secondary to itching.  - continue IVF  - continue folic acid, hydrea and vitamin B  - will monitor lab work    2. History of HTN  - continue pharmacy substitution for micardis (cozaar)  - prn hydralazine    3. History of depression  - monitor for changes in mentation  - continue prozac    4. History of Hepatitis C  - will monitor lab work    5. Nasal congestion without cough or fever  - chest xray shows no acute disease  - continue supportive treatment        Medical Decision Making:   I personally reviewed labs: CBC and BMP  I personally reviewed imaging:chest xray  I personally reviewed EKG: ordered  Toxic drug monitoring: none  Discussed case with: ED provider. After discussion I am in agreement that acuity of patient's medical condition necessitates hospital stay.       Code Status: Full Code  DVT Prophylaxis: SCD  GI Prophylaxis: protonix  Baseline: Patient is independent with ADLs at  baseline    Subjective:   CHIEF COMPLAINT: Pain all over    HISTORY OF PRESENT ILLNESS:     Matt Person is a 63 y.o.  female with PMHx

## 2023-08-11 PROBLEM — E66.09 CLASS 1 OBESITY DUE TO EXCESS CALORIES WITH SERIOUS COMORBIDITY AND BODY MASS INDEX (BMI) OF 30.0 TO 30.9 IN ADULT: Status: ACTIVE | Noted: 2019-08-13

## 2023-08-11 PROBLEM — E66.811 CLASS 1 OBESITY DUE TO EXCESS CALORIES WITH SERIOUS COMORBIDITY AND BODY MASS INDEX (BMI) OF 30.0 TO 30.9 IN ADULT: Status: ACTIVE | Noted: 2019-08-13

## 2023-08-11 LAB
COMMENT:: NORMAL
SPECIMEN HOLD: NORMAL

## 2023-08-11 PROCEDURE — 6360000002 HC RX W HCPCS: Performed by: EMERGENCY MEDICINE

## 2023-08-11 PROCEDURE — 94760 N-INVAS EAR/PLS OXIMETRY 1: CPT

## 2023-08-11 PROCEDURE — 2700000000 HC OXYGEN THERAPY PER DAY

## 2023-08-11 PROCEDURE — 36415 COLL VENOUS BLD VENIPUNCTURE: CPT

## 2023-08-11 PROCEDURE — 2580000003 HC RX 258: Performed by: NURSE PRACTITIONER

## 2023-08-11 PROCEDURE — 2500000003 HC RX 250 WO HCPCS: Performed by: INTERNAL MEDICINE

## 2023-08-11 PROCEDURE — 6360000002 HC RX W HCPCS: Performed by: INTERNAL MEDICINE

## 2023-08-11 PROCEDURE — 99221 1ST HOSP IP/OBS SF/LOW 40: CPT | Performed by: INTERNAL MEDICINE

## 2023-08-11 PROCEDURE — 6360000002 HC RX W HCPCS: Performed by: NURSE PRACTITIONER

## 2023-08-11 PROCEDURE — 1100000000 HC RM PRIVATE

## 2023-08-11 PROCEDURE — 6370000000 HC RX 637 (ALT 250 FOR IP): Performed by: NURSE PRACTITIONER

## 2023-08-11 PROCEDURE — 2500000003 HC RX 250 WO HCPCS: Performed by: NURSE PRACTITIONER

## 2023-08-11 RX ORDER — HYDROMORPHONE HYDROCHLORIDE 1 MG/ML
1 INJECTION, SOLUTION INTRAMUSCULAR; INTRAVENOUS; SUBCUTANEOUS
Status: DISCONTINUED | OUTPATIENT
Start: 2023-08-11 | End: 2023-08-15

## 2023-08-11 RX ORDER — ENOXAPARIN SODIUM 100 MG/ML
40 INJECTION SUBCUTANEOUS DAILY
Status: DISCONTINUED | OUTPATIENT
Start: 2023-08-11 | End: 2023-08-18 | Stop reason: HOSPADM

## 2023-08-11 RX ORDER — SODIUM CHLORIDE AND POTASSIUM CHLORIDE 150; 900 MG/100ML; MG/100ML
INJECTION, SOLUTION INTRAVENOUS CONTINUOUS
Status: DISCONTINUED | OUTPATIENT
Start: 2023-08-11 | End: 2023-08-18

## 2023-08-11 RX ADMIN — ONDANSETRON 4 MG: 2 INJECTION INTRAMUSCULAR; INTRAVENOUS at 16:02

## 2023-08-11 RX ADMIN — HYDROXYUREA 500 MG: 500 CAPSULE ORAL at 03:08

## 2023-08-11 RX ADMIN — HYDROMORPHONE HYDROCHLORIDE 1 MG: 1 INJECTION, SOLUTION INTRAMUSCULAR; INTRAVENOUS; SUBCUTANEOUS at 01:05

## 2023-08-11 RX ADMIN — HYDROMORPHONE HYDROCHLORIDE 1 MG: 1 INJECTION, SOLUTION INTRAMUSCULAR; INTRAVENOUS; SUBCUTANEOUS at 20:01

## 2023-08-11 RX ADMIN — HYDROMORPHONE HYDROCHLORIDE 1 MG: 1 INJECTION, SOLUTION INTRAMUSCULAR; INTRAVENOUS; SUBCUTANEOUS at 16:01

## 2023-08-11 RX ADMIN — POTASSIUM CHLORIDE AND SODIUM CHLORIDE: 900; 150 INJECTION, SOLUTION INTRAVENOUS at 18:06

## 2023-08-11 RX ADMIN — SODIUM CHLORIDE: 9 INJECTION, SOLUTION INTRAVENOUS at 16:53

## 2023-08-11 RX ADMIN — DIPHENHYDRAMINE HYDROCHLORIDE 25 MG: 25 CAPSULE ORAL at 15:19

## 2023-08-11 RX ADMIN — HYDROXYUREA 500 MG: 500 CAPSULE ORAL at 22:27

## 2023-08-11 RX ADMIN — FLUOXETINE 10 MG: 10 CAPSULE ORAL at 08:44

## 2023-08-11 RX ADMIN — HYDROMORPHONE HYDROCHLORIDE 1 MG: 1 INJECTION, SOLUTION INTRAMUSCULAR; INTRAVENOUS; SUBCUTANEOUS at 22:46

## 2023-08-11 RX ADMIN — ONDANSETRON 4 MG: 2 INJECTION INTRAMUSCULAR; INTRAVENOUS at 06:19

## 2023-08-11 RX ADMIN — SODIUM CHLORIDE: 9 INJECTION, SOLUTION INTRAVENOUS at 10:37

## 2023-08-11 RX ADMIN — FOLIC ACID 1000 MCG: 1 TABLET ORAL at 08:44

## 2023-08-11 RX ADMIN — LOSARTAN POTASSIUM 50 MG: 50 TABLET, FILM COATED ORAL at 08:44

## 2023-08-11 RX ADMIN — DIPHENHYDRAMINE HYDROCHLORIDE 25 MG: 25 CAPSULE ORAL at 08:46

## 2023-08-11 RX ADMIN — HYDROMORPHONE HYDROCHLORIDE 1 MG: 1 INJECTION, SOLUTION INTRAMUSCULAR; INTRAVENOUS; SUBCUTANEOUS at 11:47

## 2023-08-11 RX ADMIN — PANTOPRAZOLE SODIUM 40 MG: 40 TABLET, DELAYED RELEASE ORAL at 08:47

## 2023-08-11 RX ADMIN — ONDANSETRON 4 MG: 2 INJECTION INTRAMUSCULAR; INTRAVENOUS at 20:01

## 2023-08-11 RX ADMIN — HYDROMORPHONE HYDROCHLORIDE 1 MG: 1 INJECTION, SOLUTION INTRAMUSCULAR; INTRAVENOUS; SUBCUTANEOUS at 06:20

## 2023-08-11 RX ADMIN — HYDROXYUREA 500 MG: 500 CAPSULE ORAL at 08:49

## 2023-08-11 RX ADMIN — ONDANSETRON 4 MG: 2 INJECTION INTRAMUSCULAR; INTRAVENOUS at 10:33

## 2023-08-11 RX ADMIN — HYDROCODONE BITARTRATE AND ACETAMINOPHEN 1 TABLET: 5; 325 TABLET ORAL at 08:47

## 2023-08-11 RX ADMIN — DIPHENHYDRAMINE HYDROCHLORIDE 25 MG: 25 CAPSULE ORAL at 21:20

## 2023-08-11 RX ADMIN — CYANOCOBALAMIN TAB 500 MCG 1000 MCG: 500 TAB at 08:44

## 2023-08-11 RX ADMIN — ENOXAPARIN SODIUM 40 MG: 100 INJECTION SUBCUTANEOUS at 18:07

## 2023-08-11 ASSESSMENT — PAIN SCALES - GENERAL
PAINLEVEL_OUTOF10: 9
PAINLEVEL_OUTOF10: 4
PAINLEVEL_OUTOF10: 9
PAINLEVEL_OUTOF10: 8
PAINLEVEL_OUTOF10: 9
PAINLEVEL_OUTOF10: 8
PAINLEVEL_OUTOF10: 9
PAINLEVEL_OUTOF10: 4
PAINLEVEL_OUTOF10: 5
PAINLEVEL_OUTOF10: 0
PAINLEVEL_OUTOF10: 5

## 2023-08-11 ASSESSMENT — PAIN DESCRIPTION - ORIENTATION
ORIENTATION: OTHER (COMMENT)
ORIENTATION: RIGHT;LEFT

## 2023-08-11 ASSESSMENT — PAIN DESCRIPTION - LOCATION
LOCATION: GENERALIZED
LOCATION: GENERALIZED
LOCATION: OTHER (COMMENT)
LOCATION: OTHER (COMMENT)

## 2023-08-11 ASSESSMENT — PAIN DESCRIPTION - DESCRIPTORS
DESCRIPTORS: ACHING;DISCOMFORT
DESCRIPTORS: ACHING
DESCRIPTORS: ACHING;DISCOMFORT
DESCRIPTORS: ACHING;THROBBING
DESCRIPTORS: ACHING

## 2023-08-11 NOTE — CARE COORDINATION
Transition of Care Plan:    RUR: 9%  Prior Level of Functioning: Independent with ADLs  Disposition: Home with family support   Follow up appointments: Follow up with PCP and/or Specialist   DME needed: N/A  Transportation at discharge: Family to assist with transport   IM/IMM Medicare/ letter given: 2nd IM Medicare Letter   Is patient a  and connected with [facility]? N/A   If yes, was [employer] transfer form completed and [facility] notified? N/A  Caregiver Contact: Mikie Burris (family member) [phone]  Discharge Caregiver contacted prior to discharge? Family to be contacted   Care Conference needed? Not at this time   Barriers to discharge: Medical Stable      CM staffed case with clinical team.  Pt will remain as inpatient 46hrs or greater. Pt is currently being monitored for pain and for sickle cell management. Pt will not require additional needs at the time of d/c. CM will continue to follow.     ANASTASIIA Flores   [phone]

## 2023-08-11 NOTE — PROGRESS NOTES
.End of Shift Note    Bedside shift change report given to Charley RN   by Leonel Barkley RN . Report included the following information SBAR, Kardex, Intake/Output, MAR, Recent Results, and Med Rec Status    Shift worked:  4625-6389     Shift summary and any significant changes:     Pt given prescribed med's per MAR. Pt vomited  once on shift, Zofran given. Continuous fluids going 150 ml / hr.       Concerns for physician to address:  none     Zone phone for oncoming shift:   Danny Izquierdo RN

## 2023-08-12 LAB
ALBUMIN SERPL-MCNC: 3.1 G/DL (ref 3.5–5)
ALBUMIN/GLOB SERPL: 0.7 (ref 1.1–2.2)
ALP SERPL-CCNC: 93 U/L (ref 45–117)
ALT SERPL-CCNC: 26 U/L (ref 12–78)
ANION GAP SERPL CALC-SCNC: 2 MMOL/L (ref 5–15)
AST SERPL-CCNC: 36 U/L (ref 15–37)
BASOPHILS # BLD: 0 K/UL (ref 0–0.1)
BASOPHILS NFR BLD: 0 % (ref 0–1)
BILIRUB SERPL-MCNC: 1.2 MG/DL (ref 0.2–1)
BUN SERPL-MCNC: 5 MG/DL (ref 6–20)
BUN/CREAT SERPL: 7 (ref 12–20)
CALCIUM SERPL-MCNC: 8.3 MG/DL (ref 8.5–10.1)
CHLORIDE SERPL-SCNC: 108 MMOL/L (ref 97–108)
CO2 SERPL-SCNC: 28 MMOL/L (ref 21–32)
COMMENT:: NORMAL
CREAT SERPL-MCNC: 0.74 MG/DL (ref 0.55–1.02)
DIFFERENTIAL METHOD BLD: ABNORMAL
EOSINOPHIL # BLD: 0.2 K/UL (ref 0–0.4)
EOSINOPHIL NFR BLD: 3 % (ref 0–7)
ERYTHROCYTE [DISTWIDTH] IN BLOOD BY AUTOMATED COUNT: 14.4 % (ref 11.5–14.5)
GLOBULIN SER CALC-MCNC: 4.4 G/DL (ref 2–4)
GLUCOSE SERPL-MCNC: 157 MG/DL (ref 65–100)
HCT VFR BLD AUTO: 20.6 % (ref 35–47)
HGB BLD-MCNC: 7.3 G/DL (ref 11.5–16)
IMM GRANULOCYTES # BLD AUTO: 0.1 K/UL (ref 0–0.04)
IMM GRANULOCYTES NFR BLD AUTO: 1 % (ref 0–0.5)
LYMPHOCYTES # BLD: 2.1 K/UL (ref 0.8–3.5)
LYMPHOCYTES NFR BLD: 29 % (ref 12–49)
MCH RBC QN AUTO: 37.8 PG (ref 26–34)
MCHC RBC AUTO-ENTMCNC: 35.4 G/DL (ref 30–36.5)
MCV RBC AUTO: 106.7 FL (ref 80–99)
MONOCYTES # BLD: 0.9 K/UL (ref 0–1)
MONOCYTES NFR BLD: 13 % (ref 5–13)
NEUTS SEG # BLD: 3.8 K/UL (ref 1.8–8)
NEUTS SEG NFR BLD: 54 % (ref 32–75)
NRBC # BLD: 0.56 K/UL (ref 0–0.01)
NRBC BLD-RTO: 7.8 PER 100 WBC
PLATELET # BLD AUTO: 210 K/UL (ref 150–400)
PMV BLD AUTO: 11.5 FL (ref 8.9–12.9)
POTASSIUM SERPL-SCNC: 4 MMOL/L (ref 3.5–5.1)
PROT SERPL-MCNC: 7.5 G/DL (ref 6.4–8.2)
RBC # BLD AUTO: 1.93 M/UL (ref 3.8–5.2)
RBC MORPH BLD: ABNORMAL
RBC MORPH BLD: ABNORMAL
SODIUM SERPL-SCNC: 138 MMOL/L (ref 136–145)
SPECIMEN HOLD: NORMAL
WBC # BLD AUTO: 7.1 K/UL (ref 3.6–11)

## 2023-08-12 PROCEDURE — 6360000002 HC RX W HCPCS: Performed by: INTERNAL MEDICINE

## 2023-08-12 PROCEDURE — 2500000003 HC RX 250 WO HCPCS: Performed by: INTERNAL MEDICINE

## 2023-08-12 PROCEDURE — 80053 COMPREHEN METABOLIC PANEL: CPT

## 2023-08-12 PROCEDURE — 99221 1ST HOSP IP/OBS SF/LOW 40: CPT | Performed by: INTERNAL MEDICINE

## 2023-08-12 PROCEDURE — 6360000002 HC RX W HCPCS: Performed by: EMERGENCY MEDICINE

## 2023-08-12 PROCEDURE — 94760 N-INVAS EAR/PLS OXIMETRY 1: CPT

## 2023-08-12 PROCEDURE — 36415 COLL VENOUS BLD VENIPUNCTURE: CPT

## 2023-08-12 PROCEDURE — 6370000000 HC RX 637 (ALT 250 FOR IP): Performed by: INTERNAL MEDICINE

## 2023-08-12 PROCEDURE — 85025 COMPLETE CBC W/AUTO DIFF WBC: CPT

## 2023-08-12 PROCEDURE — 1100000000 HC RM PRIVATE

## 2023-08-12 PROCEDURE — 6370000000 HC RX 637 (ALT 250 FOR IP): Performed by: NURSE PRACTITIONER

## 2023-08-12 RX ORDER — FENTANYL 75 UG/H
1 PATCH TRANSDERMAL
Status: DISCONTINUED | OUTPATIENT
Start: 2023-08-12 | End: 2023-08-18 | Stop reason: HOSPADM

## 2023-08-12 RX ADMIN — CYANOCOBALAMIN TAB 500 MCG 1000 MCG: 500 TAB at 09:07

## 2023-08-12 RX ADMIN — HYDROMORPHONE HYDROCHLORIDE 1 MG: 1 INJECTION, SOLUTION INTRAMUSCULAR; INTRAVENOUS; SUBCUTANEOUS at 15:15

## 2023-08-12 RX ADMIN — ONDANSETRON 4 MG: 2 INJECTION INTRAMUSCULAR; INTRAVENOUS at 13:38

## 2023-08-12 RX ADMIN — HYDROMORPHONE HYDROCHLORIDE 1 MG: 1 INJECTION, SOLUTION INTRAMUSCULAR; INTRAVENOUS; SUBCUTANEOUS at 22:05

## 2023-08-12 RX ADMIN — LOSARTAN POTASSIUM 50 MG: 50 TABLET, FILM COATED ORAL at 09:07

## 2023-08-12 RX ADMIN — HYDROMORPHONE HYDROCHLORIDE 1 MG: 1 INJECTION, SOLUTION INTRAMUSCULAR; INTRAVENOUS; SUBCUTANEOUS at 01:50

## 2023-08-12 RX ADMIN — ENOXAPARIN SODIUM 40 MG: 100 INJECTION SUBCUTANEOUS at 09:07

## 2023-08-12 RX ADMIN — FOLIC ACID 1000 MCG: 1 TABLET ORAL at 09:07

## 2023-08-12 RX ADMIN — HYDROXYUREA 500 MG: 500 CAPSULE ORAL at 22:04

## 2023-08-12 RX ADMIN — POTASSIUM CHLORIDE AND SODIUM CHLORIDE: 900; 150 INJECTION, SOLUTION INTRAVENOUS at 19:06

## 2023-08-12 RX ADMIN — POTASSIUM CHLORIDE AND SODIUM CHLORIDE: 900; 150 INJECTION, SOLUTION INTRAVENOUS at 02:42

## 2023-08-12 RX ADMIN — ONDANSETRON 4 MG: 2 INJECTION INTRAMUSCULAR; INTRAVENOUS at 01:51

## 2023-08-12 RX ADMIN — ONDANSETRON 4 MG: 2 INJECTION INTRAMUSCULAR; INTRAVENOUS at 09:02

## 2023-08-12 RX ADMIN — HYDROMORPHONE HYDROCHLORIDE 1 MG: 1 INJECTION, SOLUTION INTRAMUSCULAR; INTRAVENOUS; SUBCUTANEOUS at 09:02

## 2023-08-12 RX ADMIN — POTASSIUM CHLORIDE AND SODIUM CHLORIDE: 900; 150 INJECTION, SOLUTION INTRAVENOUS at 11:02

## 2023-08-12 RX ADMIN — PANTOPRAZOLE SODIUM 40 MG: 40 TABLET, DELAYED RELEASE ORAL at 09:07

## 2023-08-12 RX ADMIN — DIPHENHYDRAMINE HYDROCHLORIDE 25 MG: 25 CAPSULE ORAL at 20:25

## 2023-08-12 RX ADMIN — HYDROMORPHONE HYDROCHLORIDE 1 MG: 1 INJECTION, SOLUTION INTRAMUSCULAR; INTRAVENOUS; SUBCUTANEOUS at 05:07

## 2023-08-12 RX ADMIN — HYDROMORPHONE HYDROCHLORIDE 1 MG: 1 INJECTION, SOLUTION INTRAMUSCULAR; INTRAVENOUS; SUBCUTANEOUS at 12:06

## 2023-08-12 RX ADMIN — HYDROXYUREA 500 MG: 500 CAPSULE ORAL at 09:07

## 2023-08-12 RX ADMIN — ONDANSETRON 4 MG: 2 INJECTION INTRAMUSCULAR; INTRAVENOUS at 22:06

## 2023-08-12 RX ADMIN — FLUOXETINE 10 MG: 10 CAPSULE ORAL at 09:07

## 2023-08-12 RX ADMIN — ONDANSETRON 4 MG: 2 INJECTION INTRAMUSCULAR; INTRAVENOUS at 18:15

## 2023-08-12 RX ADMIN — HYDROMORPHONE HYDROCHLORIDE 1 MG: 1 INJECTION, SOLUTION INTRAMUSCULAR; INTRAVENOUS; SUBCUTANEOUS at 18:15

## 2023-08-12 RX ADMIN — DIPHENHYDRAMINE HYDROCHLORIDE 25 MG: 25 CAPSULE ORAL at 05:07

## 2023-08-12 RX ADMIN — DIPHENHYDRAMINE HYDROCHLORIDE 25 MG: 25 CAPSULE ORAL at 13:38

## 2023-08-12 ASSESSMENT — PAIN DESCRIPTION - DESCRIPTORS
DESCRIPTORS: ACHING

## 2023-08-12 ASSESSMENT — PAIN SCALES - GENERAL
PAINLEVEL_OUTOF10: 8
PAINLEVEL_OUTOF10: 9
PAINLEVEL_OUTOF10: 10
PAINLEVEL_OUTOF10: 4
PAINLEVEL_OUTOF10: 3
PAINLEVEL_OUTOF10: 3
PAINLEVEL_OUTOF10: 9
PAINLEVEL_OUTOF10: 8
PAINLEVEL_OUTOF10: 7
PAINLEVEL_OUTOF10: 8
PAINLEVEL_OUTOF10: 8
PAINLEVEL_OUTOF10: 7
PAINLEVEL_OUTOF10: 10

## 2023-08-12 ASSESSMENT — PAIN DESCRIPTION - ORIENTATION
ORIENTATION: OTHER (COMMENT)

## 2023-08-12 ASSESSMENT — PAIN DESCRIPTION - LOCATION
LOCATION: OTHER (COMMENT)

## 2023-08-12 NOTE — PROGRESS NOTES
End of Shift Note    Bedside shift change report given to Tereza Mora (oncoming nurse) by Deepak Phillips, SUHAIL (offgoing nurse). Report included the following information SBAR, Kardex, and MAR    Shift worked:  7p-7a     Shift summary and any significant changes:     Scheduled medications were given, see MAR. IV was flushed and the patient is getting maintenance fluid. Patient was given Benadryl once, Dilaudid three times and Zofran twice, see PRN MAR. Patient is up ad clifton. Patient teaching and routine rounding  has been done. Concerns for physician to address:       Zone phone for oncoming shift:          Activity:     Number times ambulated in hallways past shift: 0  Number of times OOB to chair past shift: 0    Cardiac:   Cardiac Monitoring: Yes           Access:  Current line(s): PIV     Genitourinary:   Urinary status: voiding    Respiratory:      Chronic home O2 use?: NO  Incentive spirometer at bedside: NO       GI:     Current diet:  ADULT DIET;  Regular  Passing flatus: YES  Tolerating current diet: YES       Pain Management:   Patient states pain is manageable on current regimen: YES    Skin:     Interventions: increase time out of bed    Patient Safety:  Fall Score:    Interventions: bed/chair alarm       Length of Stay:  Expected LOS: 3  Actual LOS: 2      Deepak Phillips, RN

## 2023-08-13 LAB
APPEARANCE UR: CLEAR
BACTERIA URNS QL MICRO: NEGATIVE /HPF
BASOPHILS # BLD: 0 K/UL (ref 0–0.1)
BASOPHILS NFR BLD: 0 % (ref 0–1)
BILIRUB UR QL: NEGATIVE
COLOR UR: ABNORMAL
COMMENT:: NORMAL
DIFFERENTIAL METHOD BLD: ABNORMAL
EOSINOPHIL # BLD: 0.2 K/UL (ref 0–0.4)
EOSINOPHIL NFR BLD: 3 % (ref 0–7)
EPITH CASTS URNS QL MICRO: ABNORMAL /LPF
ERYTHROCYTE [DISTWIDTH] IN BLOOD BY AUTOMATED COUNT: 14.6 % (ref 11.5–14.5)
GLUCOSE BLD STRIP.AUTO-MCNC: 127 MG/DL (ref 65–117)
GLUCOSE UR STRIP.AUTO-MCNC: NEGATIVE MG/DL
HCT VFR BLD AUTO: 22.1 % (ref 35–47)
HGB BLD-MCNC: 7.7 G/DL (ref 11.5–16)
HGB UR QL STRIP: NEGATIVE
HYALINE CASTS URNS QL MICRO: ABNORMAL /LPF (ref 0–2)
IMM GRANULOCYTES # BLD AUTO: 0 K/UL (ref 0–0.04)
IMM GRANULOCYTES NFR BLD AUTO: 0 % (ref 0–0.5)
KETONES UR QL STRIP.AUTO: NEGATIVE MG/DL
LEUKOCYTE ESTERASE UR QL STRIP.AUTO: ABNORMAL
LYMPHOCYTES # BLD: 3.9 K/UL (ref 0.8–3.5)
LYMPHOCYTES NFR BLD: 48 % (ref 12–49)
MCH RBC QN AUTO: 37.9 PG (ref 26–34)
MCHC RBC AUTO-ENTMCNC: 34.8 G/DL (ref 30–36.5)
MCV RBC AUTO: 108.9 FL (ref 80–99)
MONOCYTES # BLD: 0.9 K/UL (ref 0–1)
MONOCYTES NFR BLD: 11 % (ref 5–13)
NEUTS SEG # BLD: 3.1 K/UL (ref 1.8–8)
NEUTS SEG NFR BLD: 38 % (ref 32–75)
NITRITE UR QL STRIP.AUTO: NEGATIVE
NRBC # BLD: 0.51 K/UL (ref 0–0.01)
NRBC BLD-RTO: 6.3 PER 100 WBC
PH UR STRIP: 6.5 (ref 5–8)
PLATELET # BLD AUTO: 207 K/UL (ref 150–400)
PMV BLD AUTO: 11.2 FL (ref 8.9–12.9)
PROT UR STRIP-MCNC: NEGATIVE MG/DL
RBC # BLD AUTO: 2.03 M/UL (ref 3.8–5.2)
RBC #/AREA URNS HPF: ABNORMAL /HPF (ref 0–5)
RBC MORPH BLD: ABNORMAL
SERVICE CMNT-IMP: ABNORMAL
SP GR UR REFRACTOMETRY: 1.01
SPECIMEN HOLD: NORMAL
UROBILINOGEN UR QL STRIP.AUTO: 1 EU/DL (ref 0.2–1)
WBC # BLD AUTO: 8.1 K/UL (ref 3.6–11)
WBC URNS QL MICRO: ABNORMAL /HPF (ref 0–4)

## 2023-08-13 PROCEDURE — 36415 COLL VENOUS BLD VENIPUNCTURE: CPT

## 2023-08-13 PROCEDURE — 94760 N-INVAS EAR/PLS OXIMETRY 1: CPT

## 2023-08-13 PROCEDURE — 6370000000 HC RX 637 (ALT 250 FOR IP): Performed by: NURSE PRACTITIONER

## 2023-08-13 PROCEDURE — 85025 COMPLETE CBC W/AUTO DIFF WBC: CPT

## 2023-08-13 PROCEDURE — 1100000000 HC RM PRIVATE

## 2023-08-13 PROCEDURE — 6360000002 HC RX W HCPCS: Performed by: INTERNAL MEDICINE

## 2023-08-13 PROCEDURE — 2500000003 HC RX 250 WO HCPCS: Performed by: INTERNAL MEDICINE

## 2023-08-13 PROCEDURE — 6360000002 HC RX W HCPCS: Performed by: EMERGENCY MEDICINE

## 2023-08-13 PROCEDURE — 99221 1ST HOSP IP/OBS SF/LOW 40: CPT | Performed by: INTERNAL MEDICINE

## 2023-08-13 PROCEDURE — 82962 GLUCOSE BLOOD TEST: CPT

## 2023-08-13 PROCEDURE — 81001 URINALYSIS AUTO W/SCOPE: CPT

## 2023-08-13 RX ORDER — LOSARTAN POTASSIUM 100 MG/1
100 TABLET ORAL DAILY
Status: DISCONTINUED | OUTPATIENT
Start: 2023-08-14 | End: 2023-08-18 | Stop reason: HOSPADM

## 2023-08-13 RX ADMIN — HYDROMORPHONE HYDROCHLORIDE 1 MG: 1 INJECTION, SOLUTION INTRAMUSCULAR; INTRAVENOUS; SUBCUTANEOUS at 16:05

## 2023-08-13 RX ADMIN — HYDROXYUREA 500 MG: 500 CAPSULE ORAL at 22:33

## 2023-08-13 RX ADMIN — ONDANSETRON 4 MG: 2 INJECTION INTRAMUSCULAR; INTRAVENOUS at 10:54

## 2023-08-13 RX ADMIN — PANTOPRAZOLE SODIUM 40 MG: 40 TABLET, DELAYED RELEASE ORAL at 09:39

## 2023-08-13 RX ADMIN — HYDROMORPHONE HYDROCHLORIDE 1 MG: 1 INJECTION, SOLUTION INTRAMUSCULAR; INTRAVENOUS; SUBCUTANEOUS at 19:56

## 2023-08-13 RX ADMIN — POTASSIUM CHLORIDE AND SODIUM CHLORIDE: 900; 150 INJECTION, SOLUTION INTRAVENOUS at 11:43

## 2023-08-13 RX ADMIN — ONDANSETRON 4 MG: 2 INJECTION INTRAMUSCULAR; INTRAVENOUS at 06:06

## 2023-08-13 RX ADMIN — ONDANSETRON 4 MG: 2 INJECTION INTRAMUSCULAR; INTRAVENOUS at 16:05

## 2023-08-13 RX ADMIN — HYDROMORPHONE HYDROCHLORIDE 1 MG: 1 INJECTION, SOLUTION INTRAMUSCULAR; INTRAVENOUS; SUBCUTANEOUS at 03:12

## 2023-08-13 RX ADMIN — DIPHENHYDRAMINE HYDROCHLORIDE 25 MG: 25 CAPSULE ORAL at 22:37

## 2023-08-13 RX ADMIN — DIPHENHYDRAMINE HYDROCHLORIDE 25 MG: 25 CAPSULE ORAL at 10:54

## 2023-08-13 RX ADMIN — LOSARTAN POTASSIUM 50 MG: 50 TABLET, FILM COATED ORAL at 09:39

## 2023-08-13 RX ADMIN — DIPHENHYDRAMINE HYDROCHLORIDE 25 MG: 25 CAPSULE ORAL at 02:22

## 2023-08-13 RX ADMIN — CYANOCOBALAMIN TAB 500 MCG 1000 MCG: 500 TAB at 09:39

## 2023-08-13 RX ADMIN — HYDROMORPHONE HYDROCHLORIDE 1 MG: 1 INJECTION, SOLUTION INTRAMUSCULAR; INTRAVENOUS; SUBCUTANEOUS at 06:06

## 2023-08-13 RX ADMIN — POTASSIUM CHLORIDE AND SODIUM CHLORIDE: 900; 150 INJECTION, SOLUTION INTRAVENOUS at 22:37

## 2023-08-13 RX ADMIN — HYDROMORPHONE HYDROCHLORIDE 1 MG: 1 INJECTION, SOLUTION INTRAMUSCULAR; INTRAVENOUS; SUBCUTANEOUS at 23:23

## 2023-08-13 RX ADMIN — HYDROMORPHONE HYDROCHLORIDE 1 MG: 1 INJECTION, SOLUTION INTRAMUSCULAR; INTRAVENOUS; SUBCUTANEOUS at 12:54

## 2023-08-13 RX ADMIN — ONDANSETRON 4 MG: 2 INJECTION INTRAMUSCULAR; INTRAVENOUS at 02:22

## 2023-08-13 RX ADMIN — ENOXAPARIN SODIUM 40 MG: 100 INJECTION SUBCUTANEOUS at 09:40

## 2023-08-13 RX ADMIN — HYDROXYUREA 500 MG: 500 CAPSULE ORAL at 09:43

## 2023-08-13 RX ADMIN — HYDROMORPHONE HYDROCHLORIDE 1 MG: 1 INJECTION, SOLUTION INTRAMUSCULAR; INTRAVENOUS; SUBCUTANEOUS at 09:41

## 2023-08-13 RX ADMIN — ONDANSETRON 4 MG: 2 INJECTION INTRAMUSCULAR; INTRAVENOUS at 23:23

## 2023-08-13 RX ADMIN — ONDANSETRON 4 MG: 2 INJECTION INTRAMUSCULAR; INTRAVENOUS at 19:56

## 2023-08-13 RX ADMIN — POTASSIUM CHLORIDE AND SODIUM CHLORIDE: 900; 150 INJECTION, SOLUTION INTRAVENOUS at 03:09

## 2023-08-13 RX ADMIN — FOLIC ACID 1000 MCG: 1 TABLET ORAL at 09:39

## 2023-08-13 RX ADMIN — POLYETHYLENE GLYCOL 3350 17 G: 17 POWDER, FOR SOLUTION ORAL at 09:39

## 2023-08-13 RX ADMIN — FLUOXETINE 10 MG: 10 CAPSULE ORAL at 09:39

## 2023-08-13 RX ADMIN — HYDROMORPHONE HYDROCHLORIDE 1 MG: 1 INJECTION, SOLUTION INTRAMUSCULAR; INTRAVENOUS; SUBCUTANEOUS at 00:33

## 2023-08-13 ASSESSMENT — PAIN DESCRIPTION - LOCATION
LOCATION: OTHER (COMMENT)

## 2023-08-13 ASSESSMENT — PAIN SCALES - GENERAL
PAINLEVEL_OUTOF10: 4
PAINLEVEL_OUTOF10: 8
PAINLEVEL_OUTOF10: 9
PAINLEVEL_OUTOF10: 5
PAINLEVEL_OUTOF10: 3
PAINLEVEL_OUTOF10: 8
PAINLEVEL_OUTOF10: 3
PAINLEVEL_OUTOF10: 8
PAINLEVEL_OUTOF10: 9
PAINLEVEL_OUTOF10: 9

## 2023-08-13 ASSESSMENT — PAIN DESCRIPTION - DESCRIPTORS
DESCRIPTORS: ACHING
DESCRIPTORS: OTHER (COMMENT)
DESCRIPTORS: ACHING

## 2023-08-13 ASSESSMENT — PAIN DESCRIPTION - ORIENTATION
ORIENTATION: OTHER (COMMENT)
ORIENTATION: RIGHT;LEFT
ORIENTATION: OTHER (COMMENT)

## 2023-08-13 NOTE — PROGRESS NOTES
End of Shift Note    Bedside shift change report given to Monalisa Santos (oncoming nurse) by Constantino Plascencia RN (offgoing nurse). Report included the following information SBAR, Kardex, and MAR    Shift worked:  7p-7a     Shift summary and any significant changes:     Scheduled medications were given, see MAR. IV has been flushed and the patient is getting maintenance fluid. Patient was given Dilaudid four times, Zofran three and Benadryl twice, see PRN MAR. Patient is up ad clifton. Morning lab was done. Patient teaching and routine rounding has been done. Concerns for physician to address:  Patient has been having break-through pain after two hours of getting IV Dilaudid. Zone phone for oncoming shift:          Activity:     Number times ambulated in hallways past shift: 0  Number of times OOB to chair past shift: 0    Cardiac:   Cardiac Monitoring: Yes           Access:  Current line(s): PIV     Genitourinary:   Urinary status: voiding    Respiratory:      Chronic home O2 use?: NO  Incentive spirometer at bedside: NO       GI:     Current diet:  ADULT DIET;  Regular  Passing flatus: YES  Tolerating current diet: YES       Pain Management:   Patient states pain is manageable on current regimen: YES    Skin:     Interventions: increase time out of bed    Patient Safety:  Fall Score:    Interventions: bed/chair alarm and gripper socks       Length of Stay:  Expected LOS: 3  Actual LOS: 3      Constantino Plascencia RN

## 2023-08-13 NOTE — PROGRESS NOTES
End of Shift Note    Bedside shift change report given to Trent Callahan RN (oncoming nurse) by Kyung Merida RN (offgoing nurse). Report included the following information SBAR, Kardex, MAR, Recent Results, and Cardiac Rhythm Sinus Rhythm    Shift worked:  7 am to 7:30 pm     Shift summary and any significant changes: All scheduled medications administered. Patient had joint pain, itching, and nausea during shift; 1 dose PO PRN Benadryl, 4 doses PRN IV Dilaudid, and 3 doses PRN IV Zofran administered, see MAR. Started on Fentanyl patch applied to left deltoid, see MAR. CBC and CMP labs drawn per ED tech. Patient ambulatory to bathroom. IV flushed and patent. Nursing rounds and education completed. Concerns for physician to address:       Zone phone for oncoming shift:          Activity:     Number times ambulated in hallways past shift: 0  Number of times OOB to chair past shift: 0    Cardiac:   Cardiac Monitoring: Yes           Access:  Current line(s): PIV     Genitourinary:   Urinary status: voiding    Respiratory:      Chronic home O2 use?: NO  Incentive spirometer at bedside: NO       GI:     Current diet:  ADULT DIET;  Regular  Passing flatus: YES  Tolerating current diet: YES       Pain Management:   Patient states pain is manageable on current regimen: YES    Skin:     Interventions: increase time out of bed    Patient Safety:  Fall Score:    Interventions: gripper socks       Length of Stay:  Expected LOS: 3  Actual LOS: 2      Kyung Merida RN

## 2023-08-14 LAB
ALBUMIN SERPL-MCNC: 3.1 G/DL (ref 3.5–5)
ALBUMIN/GLOB SERPL: 0.7 (ref 1.1–2.2)
ALP SERPL-CCNC: 83 U/L (ref 45–117)
ALT SERPL-CCNC: 19 U/L (ref 12–78)
ANION GAP SERPL CALC-SCNC: 1 MMOL/L (ref 5–15)
AST SERPL-CCNC: 32 U/L (ref 15–37)
BASOPHILS # BLD: 0 K/UL (ref 0–0.1)
BASOPHILS NFR BLD: 0 % (ref 0–1)
BILIRUB SERPL-MCNC: 1.3 MG/DL (ref 0.2–1)
BUN SERPL-MCNC: 7 MG/DL (ref 6–20)
BUN/CREAT SERPL: 11 (ref 12–20)
CALCIUM SERPL-MCNC: 8.8 MG/DL (ref 8.5–10.1)
CHLORIDE SERPL-SCNC: 104 MMOL/L (ref 97–108)
CO2 SERPL-SCNC: 31 MMOL/L (ref 21–32)
CREAT SERPL-MCNC: 0.65 MG/DL (ref 0.55–1.02)
DIFFERENTIAL METHOD BLD: ABNORMAL
EOSINOPHIL # BLD: 0.2 K/UL (ref 0–0.4)
EOSINOPHIL NFR BLD: 3 % (ref 0–7)
ERYTHROCYTE [DISTWIDTH] IN BLOOD BY AUTOMATED COUNT: 15.2 % (ref 11.5–14.5)
GLOBULIN SER CALC-MCNC: 4.3 G/DL (ref 2–4)
GLUCOSE BLD STRIP.AUTO-MCNC: 115 MG/DL (ref 65–117)
GLUCOSE BLD STRIP.AUTO-MCNC: 120 MG/DL (ref 65–117)
GLUCOSE BLD STRIP.AUTO-MCNC: 83 MG/DL (ref 65–117)
GLUCOSE SERPL-MCNC: 88 MG/DL (ref 65–100)
HCT VFR BLD AUTO: 21.4 % (ref 35–47)
HGB BLD-MCNC: 7.4 G/DL (ref 11.5–16)
IMM GRANULOCYTES # BLD AUTO: 0 K/UL (ref 0–0.04)
IMM GRANULOCYTES NFR BLD AUTO: 0 % (ref 0–0.5)
LYMPHOCYTES # BLD: 3.6 K/UL (ref 0.8–3.5)
LYMPHOCYTES NFR BLD: 46 % (ref 12–49)
MCH RBC QN AUTO: 37.4 PG (ref 26–34)
MCHC RBC AUTO-ENTMCNC: 34.6 G/DL (ref 30–36.5)
MCV RBC AUTO: 108.1 FL (ref 80–99)
MONOCYTES # BLD: 1.1 K/UL (ref 0–1)
MONOCYTES NFR BLD: 14 % (ref 5–13)
NEUTS SEG # BLD: 2.8 K/UL (ref 1.8–8)
NEUTS SEG NFR BLD: 37 % (ref 32–75)
NRBC # BLD: 0.31 K/UL (ref 0–0.01)
NRBC BLD-RTO: 4 PER 100 WBC
PLATELET # BLD AUTO: 211 K/UL (ref 150–400)
PMV BLD AUTO: 11 FL (ref 8.9–12.9)
POTASSIUM SERPL-SCNC: 4.6 MMOL/L (ref 3.5–5.1)
PROT SERPL-MCNC: 7.4 G/DL (ref 6.4–8.2)
RBC # BLD AUTO: 1.98 M/UL (ref 3.8–5.2)
RBC MORPH BLD: ABNORMAL
SERVICE CMNT-IMP: ABNORMAL
SERVICE CMNT-IMP: NORMAL
SERVICE CMNT-IMP: NORMAL
SODIUM SERPL-SCNC: 136 MMOL/L (ref 136–145)
WBC # BLD AUTO: 7.7 K/UL (ref 3.6–11)

## 2023-08-14 PROCEDURE — 36415 COLL VENOUS BLD VENIPUNCTURE: CPT

## 2023-08-14 PROCEDURE — 2700000000 HC OXYGEN THERAPY PER DAY

## 2023-08-14 PROCEDURE — 6370000000 HC RX 637 (ALT 250 FOR IP): Performed by: NURSE PRACTITIONER

## 2023-08-14 PROCEDURE — 6360000002 HC RX W HCPCS: Performed by: INTERNAL MEDICINE

## 2023-08-14 PROCEDURE — 6360000002 HC RX W HCPCS: Performed by: EMERGENCY MEDICINE

## 2023-08-14 PROCEDURE — 82962 GLUCOSE BLOOD TEST: CPT

## 2023-08-14 PROCEDURE — 94760 N-INVAS EAR/PLS OXIMETRY 1: CPT

## 2023-08-14 PROCEDURE — 85025 COMPLETE CBC W/AUTO DIFF WBC: CPT

## 2023-08-14 PROCEDURE — 1100000000 HC RM PRIVATE

## 2023-08-14 PROCEDURE — 80053 COMPREHEN METABOLIC PANEL: CPT

## 2023-08-14 PROCEDURE — 6370000000 HC RX 637 (ALT 250 FOR IP): Performed by: INTERNAL MEDICINE

## 2023-08-14 PROCEDURE — 99221 1ST HOSP IP/OBS SF/LOW 40: CPT | Performed by: INTERNAL MEDICINE

## 2023-08-14 PROCEDURE — 2500000003 HC RX 250 WO HCPCS: Performed by: INTERNAL MEDICINE

## 2023-08-14 RX ADMIN — HYDROMORPHONE HYDROCHLORIDE 1 MG: 1 INJECTION, SOLUTION INTRAMUSCULAR; INTRAVENOUS; SUBCUTANEOUS at 13:41

## 2023-08-14 RX ADMIN — ONDANSETRON 4 MG: 2 INJECTION INTRAMUSCULAR; INTRAVENOUS at 18:46

## 2023-08-14 RX ADMIN — HYDROMORPHONE HYDROCHLORIDE 1 MG: 1 INJECTION, SOLUTION INTRAMUSCULAR; INTRAVENOUS; SUBCUTANEOUS at 05:47

## 2023-08-14 RX ADMIN — ONDANSETRON 4 MG: 2 INJECTION INTRAMUSCULAR; INTRAVENOUS at 09:40

## 2023-08-14 RX ADMIN — DIPHENHYDRAMINE HYDROCHLORIDE 25 MG: 25 CAPSULE ORAL at 18:46

## 2023-08-14 RX ADMIN — ONDANSETRON 4 MG: 2 INJECTION INTRAMUSCULAR; INTRAVENOUS at 13:41

## 2023-08-14 RX ADMIN — ENOXAPARIN SODIUM 40 MG: 100 INJECTION SUBCUTANEOUS at 08:32

## 2023-08-14 RX ADMIN — FOLIC ACID 1000 MCG: 1 TABLET ORAL at 08:31

## 2023-08-14 RX ADMIN — DIPHENHYDRAMINE HYDROCHLORIDE 25 MG: 25 CAPSULE ORAL at 05:46

## 2023-08-14 RX ADMIN — POTASSIUM CHLORIDE AND SODIUM CHLORIDE: 900; 150 INJECTION, SOLUTION INTRAVENOUS at 06:53

## 2023-08-14 RX ADMIN — HYDROXYUREA 500 MG: 500 CAPSULE ORAL at 21:16

## 2023-08-14 RX ADMIN — HYDROXYUREA 500 MG: 500 CAPSULE ORAL at 09:43

## 2023-08-14 RX ADMIN — HYDROMORPHONE HYDROCHLORIDE 1 MG: 1 INJECTION, SOLUTION INTRAMUSCULAR; INTRAVENOUS; SUBCUTANEOUS at 16:37

## 2023-08-14 RX ADMIN — CYANOCOBALAMIN TAB 500 MCG 1000 MCG: 500 TAB at 08:32

## 2023-08-14 RX ADMIN — FLUOXETINE 10 MG: 10 CAPSULE ORAL at 08:31

## 2023-08-14 RX ADMIN — HYDROMORPHONE HYDROCHLORIDE 1 MG: 1 INJECTION, SOLUTION INTRAMUSCULAR; INTRAVENOUS; SUBCUTANEOUS at 02:20

## 2023-08-14 RX ADMIN — HYDROMORPHONE HYDROCHLORIDE 1 MG: 1 INJECTION, SOLUTION INTRAMUSCULAR; INTRAVENOUS; SUBCUTANEOUS at 09:40

## 2023-08-14 RX ADMIN — LOSARTAN POTASSIUM 100 MG: 100 TABLET, FILM COATED ORAL at 08:31

## 2023-08-14 RX ADMIN — HYDROMORPHONE HYDROCHLORIDE 1 MG: 1 INJECTION, SOLUTION INTRAMUSCULAR; INTRAVENOUS; SUBCUTANEOUS at 20:18

## 2023-08-14 RX ADMIN — ONDANSETRON 4 MG: 2 INJECTION INTRAMUSCULAR; INTRAVENOUS at 05:47

## 2023-08-14 RX ADMIN — PANTOPRAZOLE SODIUM 40 MG: 40 TABLET, DELAYED RELEASE ORAL at 08:32

## 2023-08-14 RX ADMIN — ONDANSETRON 4 MG: 2 INJECTION INTRAMUSCULAR; INTRAVENOUS at 23:11

## 2023-08-14 ASSESSMENT — PAIN SCALES - GENERAL
PAINLEVEL_OUTOF10: 8
PAINLEVEL_OUTOF10: 3
PAINLEVEL_OUTOF10: 8
PAINLEVEL_OUTOF10: 5
PAINLEVEL_OUTOF10: 8
PAINLEVEL_OUTOF10: 9
PAINLEVEL_OUTOF10: 3
PAINLEVEL_OUTOF10: 3
PAINLEVEL_OUTOF10: 8
PAINLEVEL_OUTOF10: 8

## 2023-08-14 ASSESSMENT — PAIN DESCRIPTION - DESCRIPTORS
DESCRIPTORS: ACHING
DESCRIPTORS: ACHING;DISCOMFORT
DESCRIPTORS: ACHING

## 2023-08-14 ASSESSMENT — PAIN DESCRIPTION - ORIENTATION
ORIENTATION: RIGHT;LEFT
ORIENTATION: RIGHT;LEFT;INNER
ORIENTATION: OTHER (COMMENT)
ORIENTATION: ANTERIOR;POSTERIOR
ORIENTATION: RIGHT;LEFT

## 2023-08-14 ASSESSMENT — PAIN DESCRIPTION - LOCATION
LOCATION: OTHER (COMMENT)

## 2023-08-14 ASSESSMENT — PAIN - FUNCTIONAL ASSESSMENT: PAIN_FUNCTIONAL_ASSESSMENT: PREVENTS OR INTERFERES SOME ACTIVE ACTIVITIES AND ADLS

## 2023-08-14 NOTE — PROGRESS NOTES
End of Shift Note    Bedside shift change report given to Theodora Parikh RN (oncoming nurse) by Dana Chaudhari RN (offgoing nurse). Report included the following information SBAR, Kardex, MAR, Recent Results, and Cardiac Rhythm Sinus Rhythm    Shift worked:  7 am to 7:30 pm     Shift summary and any significant changes: All scheduled medications administered. Patient had joint pain, itching, and nausea during shift; 1 dose PO PRN Benadryl, 4 doses PRN IV Dilaudid, and 3 doses PRN IV Zofran administered, see MAR. Dose PRN Miralax administered as patient hasn't had a bowel movement since 8/9, see MAR. Remote telemetry order renewed. Patient ambulatory to bathroom. IV flushed and patent. Nursing rounds and education completed. Concerns for physician to address:  Urinalysis still needs to be collected. Zone phone for oncoming shift:          Activity:     Number times ambulated in hallways past shift: ambulated in room  Number of times OOB to chair past shift: 0    Cardiac:   Cardiac Monitoring: Yes           Access:  Current line(s): PIV     Genitourinary:   Urinary status: voiding    Respiratory:      Chronic home O2 use?: NO  Incentive spirometer at bedside: NO       GI:     Current diet:  ADULT DIET;  Regular  Passing flatus: YES  Tolerating current diet: YES       Pain Management:   Patient states pain is manageable on current regimen: YES    Skin:     Interventions: increase time out of bed    Patient Safety:  Fall Score:    Interventions: gripper socks       Length of Stay:  Expected LOS: 3  Actual LOS: 3      Dana Chaudhari RN                        '

## 2023-08-14 NOTE — PROGRESS NOTES
End of Shift Note    Bedside shift change report given to Caitlyn Corrales (oncoming nurse) by Phoebe Aceves RN (offgoing nurse). Report included the following information SBAR, Kardex, and MAR    Shift worked:  7p-7a     Shift summary and any significant changes:     Scheduled medications were given, see MAR. Patient was given Dilaudid three times, Zofran twice and Benadryl twice, see PRN MAR.  IV has been flushed and the patient is getting maintenance fluid. Patient is up ad clifton. Patient teaching and routine rounding has been done. Concerns for physician to address:       Zone phone for oncoming shift:          Activity:     Number times ambulated in hallways past shift: 0  Number of times OOB to chair past shift: 0    Cardiac:   Cardiac Monitoring: Yes           Access:  Current line(s): PIV     Genitourinary:   Urinary status: voiding    Respiratory:      Chronic home O2 use?: NO  Incentive spirometer at bedside: NO       GI:     Current diet:  ADULT DIET;  Regular  Passing flatus: YES  Tolerating current diet: YES       Pain Management:   Patient states pain is manageable on current regimen: YES    Skin:     Interventions: increase time out of bed    Patient Safety:  Fall Score:    Interventions: bed/chair alarm       Length of Stay:  Expected LOS: 3  Actual LOS: [address], RN

## 2023-08-14 NOTE — CARE COORDINATION
Transition of Care Plan:    RUR: 9%  Prior Level of Functioning: Independent with ADLs  Disposition: Home with family support   Follow up appointments: Follow up with PCP and/or Specialist   DME needed: N/A  Transportation at discharge: Family to assist with transport   IM/IMM Medicare/ letter given: 2nd IM Medicare Letter   Is patient a  and connected with VA? N/A   If yes, was [place] transfer form completed and VA notified? N/A  Caregiver Contact: Harry Nunez (family member) [phone]  Discharge Caregiver contacted prior to discharge? Family to be contacted   Care Conference needed? Not at this time   Barriers to discharge: Medical Stable      CM informed that pt will remain as inpatient for 24hrs. Pt is being monitored for for pain management and sickle cell. CM will continue to follow.     August ANASTASIIA Alcaraz   [phone]

## 2023-08-14 NOTE — PROGRESS NOTES
.End of Shift Note    Bedside shift change report given to Kayla Simon RN (oncoming nurse) by Catalina Srivastava, RN (offgoing nurse). Report included the following information SBAR, Kardex, and MAR    Shift worked: 7a-7p     Shift summary and any significant changes:    Patient tolerating care fairly well. Medications given per [facility] and education has been provided. PRN Dilaudid given x3. PRN Zofran given x3. PRN Benadryl x1. Pt is a standby assist to the BR. Hourly rounding completed and pt is resting quietly.             Catalina Srivastava, RN

## 2023-08-15 LAB
ANION GAP SERPL CALC-SCNC: 0 MMOL/L (ref 5–15)
BASOPHILS # BLD: 0.1 K/UL (ref 0–0.1)
BASOPHILS NFR BLD: 1 % (ref 0–1)
BUN SERPL-MCNC: 9 MG/DL (ref 6–20)
BUN/CREAT SERPL: 12 (ref 12–20)
CALCIUM SERPL-MCNC: 9.3 MG/DL (ref 8.5–10.1)
CHLORIDE SERPL-SCNC: 104 MMOL/L (ref 97–108)
CO2 SERPL-SCNC: 32 MMOL/L (ref 21–32)
CREAT SERPL-MCNC: 0.73 MG/DL (ref 0.55–1.02)
DIFFERENTIAL METHOD BLD: ABNORMAL
EOSINOPHIL # BLD: 0.3 K/UL (ref 0–0.4)
EOSINOPHIL NFR BLD: 4 % (ref 0–7)
ERYTHROCYTE [DISTWIDTH] IN BLOOD BY AUTOMATED COUNT: 14.6 % (ref 11.5–14.5)
GLUCOSE SERPL-MCNC: 86 MG/DL (ref 65–100)
HCT VFR BLD AUTO: 23.4 % (ref 35–47)
HGB BLD-MCNC: 8.3 G/DL (ref 11.5–16)
IMM GRANULOCYTES # BLD AUTO: 0 K/UL (ref 0–0.04)
IMM GRANULOCYTES NFR BLD AUTO: 0 % (ref 0–0.5)
LYMPHOCYTES # BLD: 3.3 K/UL (ref 0.8–3.5)
LYMPHOCYTES NFR BLD: 45 % (ref 12–49)
MCH RBC QN AUTO: 38.2 PG (ref 26–34)
MCHC RBC AUTO-ENTMCNC: 35.5 G/DL (ref 30–36.5)
MCV RBC AUTO: 107.8 FL (ref 80–99)
MONOCYTES # BLD: 0.9 K/UL (ref 0–1)
MONOCYTES NFR BLD: 12 % (ref 5–13)
NEUTS SEG # BLD: 2.9 K/UL (ref 1.8–8)
NEUTS SEG NFR BLD: 38 % (ref 32–75)
NRBC # BLD: 0.37 K/UL (ref 0–0.01)
NRBC BLD-RTO: 4.9 PER 100 WBC
PLATELET # BLD AUTO: 231 K/UL (ref 150–400)
PLATELET COMMENT: ABNORMAL
PMV BLD AUTO: 11.7 FL (ref 8.9–12.9)
POTASSIUM SERPL-SCNC: 4.3 MMOL/L (ref 3.5–5.1)
RBC # BLD AUTO: 2.17 M/UL (ref 3.8–5.2)
RBC MORPH BLD: ABNORMAL
SODIUM SERPL-SCNC: 136 MMOL/L (ref 136–145)
WBC # BLD AUTO: 7.5 K/UL (ref 3.6–11)

## 2023-08-15 PROCEDURE — 2700000000 HC OXYGEN THERAPY PER DAY

## 2023-08-15 PROCEDURE — 6360000002 HC RX W HCPCS: Performed by: INTERNAL MEDICINE

## 2023-08-15 PROCEDURE — 94760 N-INVAS EAR/PLS OXIMETRY 1: CPT

## 2023-08-15 PROCEDURE — 6360000002 HC RX W HCPCS: Performed by: EMERGENCY MEDICINE

## 2023-08-15 PROCEDURE — 80048 BASIC METABOLIC PNL TOTAL CA: CPT

## 2023-08-15 PROCEDURE — 99221 1ST HOSP IP/OBS SF/LOW 40: CPT | Performed by: INTERNAL MEDICINE

## 2023-08-15 PROCEDURE — 6370000000 HC RX 637 (ALT 250 FOR IP): Performed by: INTERNAL MEDICINE

## 2023-08-15 PROCEDURE — 1100000000 HC RM PRIVATE

## 2023-08-15 PROCEDURE — 2500000003 HC RX 250 WO HCPCS: Performed by: INTERNAL MEDICINE

## 2023-08-15 PROCEDURE — 6370000000 HC RX 637 (ALT 250 FOR IP): Performed by: NURSE PRACTITIONER

## 2023-08-15 PROCEDURE — 36415 COLL VENOUS BLD VENIPUNCTURE: CPT

## 2023-08-15 PROCEDURE — 85025 COMPLETE CBC W/AUTO DIFF WBC: CPT

## 2023-08-15 RX ORDER — HYDROMORPHONE HYDROCHLORIDE 1 MG/ML
1 INJECTION, SOLUTION INTRAMUSCULAR; INTRAVENOUS; SUBCUTANEOUS EVERY 4 HOURS PRN
Status: DISCONTINUED | OUTPATIENT
Start: 2023-08-15 | End: 2023-08-17

## 2023-08-15 RX ADMIN — HYDROXYUREA 500 MG: 500 CAPSULE ORAL at 09:07

## 2023-08-15 RX ADMIN — FOLIC ACID 1000 MCG: 1 TABLET ORAL at 08:14

## 2023-08-15 RX ADMIN — HYDROMORPHONE HYDROCHLORIDE 1 MG: 1 INJECTION, SOLUTION INTRAMUSCULAR; INTRAVENOUS; SUBCUTANEOUS at 20:54

## 2023-08-15 RX ADMIN — ONDANSETRON 4 MG: 2 INJECTION INTRAMUSCULAR; INTRAVENOUS at 03:38

## 2023-08-15 RX ADMIN — FLUOXETINE 10 MG: 10 CAPSULE ORAL at 08:14

## 2023-08-15 RX ADMIN — DIPHENHYDRAMINE HYDROCHLORIDE 25 MG: 25 CAPSULE ORAL at 14:21

## 2023-08-15 RX ADMIN — DIPHENHYDRAMINE HYDROCHLORIDE 25 MG: 25 CAPSULE ORAL at 20:54

## 2023-08-15 RX ADMIN — ONDANSETRON 4 MG: 2 INJECTION INTRAMUSCULAR; INTRAVENOUS at 14:21

## 2023-08-15 RX ADMIN — LOSARTAN POTASSIUM 100 MG: 100 TABLET, FILM COATED ORAL at 08:15

## 2023-08-15 RX ADMIN — POTASSIUM CHLORIDE AND SODIUM CHLORIDE: 900; 150 INJECTION, SOLUTION INTRAVENOUS at 10:41

## 2023-08-15 RX ADMIN — HYDROXYUREA 500 MG: 500 CAPSULE ORAL at 20:58

## 2023-08-15 RX ADMIN — ONDANSETRON 4 MG: 2 INJECTION INTRAMUSCULAR; INTRAVENOUS at 23:05

## 2023-08-15 RX ADMIN — HYDROMORPHONE HYDROCHLORIDE 1 MG: 1 INJECTION, SOLUTION INTRAMUSCULAR; INTRAVENOUS; SUBCUTANEOUS at 00:33

## 2023-08-15 RX ADMIN — DIPHENHYDRAMINE HYDROCHLORIDE 25 MG: 25 CAPSULE ORAL at 08:15

## 2023-08-15 RX ADMIN — POTASSIUM CHLORIDE AND SODIUM CHLORIDE: 900; 150 INJECTION, SOLUTION INTRAVENOUS at 20:53

## 2023-08-15 RX ADMIN — HYDROMORPHONE HYDROCHLORIDE 1 MG: 1 INJECTION, SOLUTION INTRAMUSCULAR; INTRAVENOUS; SUBCUTANEOUS at 14:21

## 2023-08-15 RX ADMIN — ONDANSETRON 4 MG: 2 INJECTION INTRAMUSCULAR; INTRAVENOUS at 08:15

## 2023-08-15 RX ADMIN — ENOXAPARIN SODIUM 40 MG: 100 INJECTION SUBCUTANEOUS at 09:08

## 2023-08-15 RX ADMIN — ONDANSETRON 4 MG: 2 INJECTION INTRAMUSCULAR; INTRAVENOUS at 18:09

## 2023-08-15 RX ADMIN — HYDROMORPHONE HYDROCHLORIDE 1 MG: 1 INJECTION, SOLUTION INTRAMUSCULAR; INTRAVENOUS; SUBCUTANEOUS at 03:39

## 2023-08-15 RX ADMIN — POTASSIUM CHLORIDE AND SODIUM CHLORIDE: 900; 150 INJECTION, SOLUTION INTRAVENOUS at 00:33

## 2023-08-15 RX ADMIN — HYDROMORPHONE HYDROCHLORIDE 1 MG: 1 INJECTION, SOLUTION INTRAMUSCULAR; INTRAVENOUS; SUBCUTANEOUS at 18:05

## 2023-08-15 RX ADMIN — PANTOPRAZOLE SODIUM 40 MG: 40 TABLET, DELAYED RELEASE ORAL at 08:15

## 2023-08-15 RX ADMIN — HYDROMORPHONE HYDROCHLORIDE 1 MG: 1 INJECTION, SOLUTION INTRAMUSCULAR; INTRAVENOUS; SUBCUTANEOUS at 08:15

## 2023-08-15 RX ADMIN — DIPHENHYDRAMINE HYDROCHLORIDE 25 MG: 25 CAPSULE ORAL at 00:34

## 2023-08-15 RX ADMIN — POLYETHYLENE GLYCOL 3350 17 G: 17 POWDER, FOR SOLUTION ORAL at 09:07

## 2023-08-15 RX ADMIN — CYANOCOBALAMIN TAB 500 MCG 1000 MCG: 500 TAB at 08:15

## 2023-08-15 RX ADMIN — HYDROMORPHONE HYDROCHLORIDE 1 MG: 1 INJECTION, SOLUTION INTRAMUSCULAR; INTRAVENOUS; SUBCUTANEOUS at 11:25

## 2023-08-15 ASSESSMENT — PAIN DESCRIPTION - ORIENTATION
ORIENTATION: RIGHT;LEFT
ORIENTATION: RIGHT;LEFT
ORIENTATION: INNER
ORIENTATION: INNER

## 2023-08-15 ASSESSMENT — PAIN DESCRIPTION - DESCRIPTORS
DESCRIPTORS: ACHING;DULL;SHARP
DESCRIPTORS: ACHING;STABBING
DESCRIPTORS: STABBING
DESCRIPTORS: ACHING;STABBING;THROBBING

## 2023-08-15 ASSESSMENT — PAIN SCALES - GENERAL
PAINLEVEL_OUTOF10: 8
PAINLEVEL_OUTOF10: 7
PAINLEVEL_OUTOF10: 3
PAINLEVEL_OUTOF10: 8
PAINLEVEL_OUTOF10: 3
PAINLEVEL_OUTOF10: 7
PAINLEVEL_OUTOF10: 8
PAINLEVEL_OUTOF10: 7

## 2023-08-15 ASSESSMENT — PAIN DESCRIPTION - LOCATION
LOCATION: KNEE;SHOULDER;ELBOW
LOCATION: ANKLE;ELBOW;KNEE

## 2023-08-15 NOTE — PROGRESS NOTES
End of Shift Note    Bedside shift change report given to [address] (oncoming nurse) by Betina De Los Santos RN (offgoing nurse). Report included the following information SBAR, Kardex, Intake/Output, MAR, and Recent Results    Shift worked:  7p-7a     Shift summary and any significant changes:     Patient tolerating care fairly well. Medications given per [facility] and education has been provided. PRN Dilaudid given x3. PRN Zofran given x3. PRN Benadryl x1. Pt is a standby assist to the BR. Hourly rounding completed and pt is resting quietly.         Actual LOS: 5      Betina De Los Santos, RN

## 2023-08-16 PROCEDURE — 99221 1ST HOSP IP/OBS SF/LOW 40: CPT | Performed by: INTERNAL MEDICINE

## 2023-08-16 PROCEDURE — 2500000003 HC RX 250 WO HCPCS: Performed by: INTERNAL MEDICINE

## 2023-08-16 PROCEDURE — 6360000002 HC RX W HCPCS: Performed by: EMERGENCY MEDICINE

## 2023-08-16 PROCEDURE — 94760 N-INVAS EAR/PLS OXIMETRY 1: CPT

## 2023-08-16 PROCEDURE — 6370000000 HC RX 637 (ALT 250 FOR IP): Performed by: NURSE PRACTITIONER

## 2023-08-16 PROCEDURE — 1100000000 HC RM PRIVATE

## 2023-08-16 PROCEDURE — 2700000000 HC OXYGEN THERAPY PER DAY

## 2023-08-16 PROCEDURE — 6370000000 HC RX 637 (ALT 250 FOR IP): Performed by: INTERNAL MEDICINE

## 2023-08-16 PROCEDURE — 6360000002 HC RX W HCPCS: Performed by: INTERNAL MEDICINE

## 2023-08-16 RX ORDER — HYDROCODONE BITARTRATE AND ACETAMINOPHEN 10; 325 MG/1; MG/1
1 TABLET ORAL 4 TIMES DAILY
Status: DISCONTINUED | OUTPATIENT
Start: 2023-08-16 | End: 2023-08-18 | Stop reason: HOSPADM

## 2023-08-16 RX ADMIN — HYDROXYUREA 500 MG: 500 CAPSULE ORAL at 12:24

## 2023-08-16 RX ADMIN — HYDROMORPHONE HYDROCHLORIDE 1 MG: 1 INJECTION, SOLUTION INTRAMUSCULAR; INTRAVENOUS; SUBCUTANEOUS at 21:47

## 2023-08-16 RX ADMIN — DIPHENHYDRAMINE HYDROCHLORIDE 25 MG: 25 CAPSULE ORAL at 20:40

## 2023-08-16 RX ADMIN — LOSARTAN POTASSIUM 100 MG: 100 TABLET, FILM COATED ORAL at 09:45

## 2023-08-16 RX ADMIN — HYDROMORPHONE HYDROCHLORIDE 1 MG: 1 INJECTION, SOLUTION INTRAMUSCULAR; INTRAVENOUS; SUBCUTANEOUS at 01:05

## 2023-08-16 RX ADMIN — ONDANSETRON 4 MG: 2 INJECTION INTRAMUSCULAR; INTRAVENOUS at 21:47

## 2023-08-16 RX ADMIN — DIPHENHYDRAMINE HYDROCHLORIDE 25 MG: 25 CAPSULE ORAL at 13:57

## 2023-08-16 RX ADMIN — ONDANSETRON 4 MG: 2 INJECTION INTRAMUSCULAR; INTRAVENOUS at 13:56

## 2023-08-16 RX ADMIN — DIPHENHYDRAMINE HYDROCHLORIDE 25 MG: 25 CAPSULE ORAL at 01:05

## 2023-08-16 RX ADMIN — PANTOPRAZOLE SODIUM 40 MG: 40 TABLET, DELAYED RELEASE ORAL at 07:58

## 2023-08-16 RX ADMIN — ONDANSETRON 4 MG: 2 INJECTION INTRAMUSCULAR; INTRAVENOUS at 04:01

## 2023-08-16 RX ADMIN — POTASSIUM CHLORIDE AND SODIUM CHLORIDE: 900; 150 INJECTION, SOLUTION INTRAVENOUS at 12:26

## 2023-08-16 RX ADMIN — ONDANSETRON 4 MG: 2 INJECTION INTRAMUSCULAR; INTRAVENOUS at 07:58

## 2023-08-16 RX ADMIN — HYDROMORPHONE HYDROCHLORIDE 1 MG: 1 INJECTION, SOLUTION INTRAMUSCULAR; INTRAVENOUS; SUBCUTANEOUS at 09:40

## 2023-08-16 RX ADMIN — POLYETHYLENE GLYCOL 3350 17 G: 17 POWDER, FOR SOLUTION ORAL at 10:34

## 2023-08-16 RX ADMIN — DIPHENHYDRAMINE HYDROCHLORIDE 25 MG: 25 CAPSULE ORAL at 06:51

## 2023-08-16 RX ADMIN — HYDROMORPHONE HYDROCHLORIDE 1 MG: 1 INJECTION, SOLUTION INTRAMUSCULAR; INTRAVENOUS; SUBCUTANEOUS at 13:58

## 2023-08-16 RX ADMIN — CYANOCOBALAMIN TAB 500 MCG 1000 MCG: 500 TAB at 09:45

## 2023-08-16 RX ADMIN — SENNOSIDES AND DOCUSATE SODIUM 2 TABLET: 50; 8.6 TABLET ORAL at 10:34

## 2023-08-16 RX ADMIN — FOLIC ACID 1000 MCG: 1 TABLET ORAL at 09:45

## 2023-08-16 RX ADMIN — HYDROXYUREA 500 MG: 500 CAPSULE ORAL at 21:54

## 2023-08-16 RX ADMIN — FLUOXETINE 10 MG: 10 CAPSULE ORAL at 09:44

## 2023-08-16 RX ADMIN — HYDROMORPHONE HYDROCHLORIDE 1 MG: 1 INJECTION, SOLUTION INTRAMUSCULAR; INTRAVENOUS; SUBCUTANEOUS at 05:03

## 2023-08-16 RX ADMIN — HYDROMORPHONE HYDROCHLORIDE 1 MG: 1 INJECTION, SOLUTION INTRAMUSCULAR; INTRAVENOUS; SUBCUTANEOUS at 18:11

## 2023-08-16 RX ADMIN — ONDANSETRON 4 MG: 2 INJECTION INTRAMUSCULAR; INTRAVENOUS at 18:10

## 2023-08-16 RX ADMIN — ENOXAPARIN SODIUM 40 MG: 100 INJECTION SUBCUTANEOUS at 09:47

## 2023-08-16 ASSESSMENT — PAIN DESCRIPTION - LOCATION
LOCATION: OTHER (COMMENT)
LOCATION: ANKLE;SHOULDER;KNEE
LOCATION: KNEE;ANKLE
LOCATION: ANKLE;SHOULDER;KNEE

## 2023-08-16 ASSESSMENT — PAIN DESCRIPTION - ORIENTATION
ORIENTATION: RIGHT;LEFT
ORIENTATION: RIGHT;LEFT

## 2023-08-16 ASSESSMENT — PAIN SCALES - GENERAL
PAINLEVEL_OUTOF10: 3
PAINLEVEL_OUTOF10: 7
PAINLEVEL_OUTOF10: 3
PAINLEVEL_OUTOF10: 7
PAINLEVEL_OUTOF10: 3
PAINLEVEL_OUTOF10: 7
PAINLEVEL_OUTOF10: 3
PAINLEVEL_OUTOF10: 6
PAINLEVEL_OUTOF10: 7

## 2023-08-16 ASSESSMENT — PAIN DESCRIPTION - DESCRIPTORS
DESCRIPTORS: ACHING;DULL
DESCRIPTORS: ACHING;DULL

## 2023-08-16 NOTE — PROGRESS NOTES
Spiritual Care Assessment/Progress Note  [place]    Name: Tiki Meza MRN: [id number]    Age: 63 y.o. Sex: female   Language: English     Date: 8/16/2023            Total Time Calculated: 33 min              Spiritual Assessment begun in [facility]  Service Provided For[de-identified] Patient  Referral/Consult From[de-identified] Rounding  Encounter Overview/Reason : Initial Encounter    Spiritual beliefs:      [x] Involved in a shilpa tradition/spiritual practice:      [] Supported by a shilpa community:      [] Claims no spiritual orientation:      [] Seeking spiritual identity:           [] Adheres to an individual form of spirituality:      [] Not able to assess:                Identified resources for coping and support system:   Support System: Family members       [x] Prayer                  [] Devotional reading               [] Music                  [] Guided Imagery     [] Pet visits                                        [] Other: (COMMENT)     Specific area/focus of visit   Encounter:    Crisis:    Spiritual/Emotional needs: Type: Spiritual Support  Ritual, Rites and Sacraments:    Grief, Loss, and Adjustments: Type: Adjustment to illness  Ethics/Mediation:    Behavioral Health:    Palliative Care: Advance Care Planning:      Plan/Referrals: No future visits requested    Narrative:   Visit was in [phone] for initial spiritual assessment. Patient was sitting in bed and seemed comfortable. She was receptive of visit and engaged in conversation about her medical condition and about shilpa and family. She has experienced several hospitalizations due to her condition which she's had since childhood. Her mother, a retired nurse, worked really hard to manage her condition when she was growing up. She spoke at length sharing several insights about how she managed her condition to enhance her quality of life.  Shilpa also helps greatly in coping and she prays for God's guidance in how to manage her

## 2023-08-16 NOTE — CARE COORDINATION
Transition of Care Plan:    RUR: 9%  Prior Level of Functioning: Independent with ADLs  Disposition: Home with family support   Follow up appointments: Follow up with PCP and/or Specialist   DME needed: N/A  Transportation at discharge: Family to assist with transport   IM/IMM Medicare/ letter given: 2nd IM Medicare Letter   Is patient a  and connected with VA? N/A   If yes, was [place] transfer form completed and VA notified? N/A  Caregiver Contact: Courtney Johnson (family member) [phone]  Discharge Caregiver contacted prior to discharge? Family to be contacted   Care Conference needed? Not at this time   Barriers to discharge: Medical Stable      CM informed that pt anabelle remain as inpatient for 24-48hrs, due to pain management and sickle cell management. .  No additional needs at this time. CM will continue to follow.     ANASTASIIA Nelson   [phone]

## 2023-08-16 NOTE — PROGRESS NOTES
End of Shift Note    Bedside shift change report given to  [facility] (oncoming nurse) by Deshawn Tamez RN (offgoing nurse). Report included the following information SBAR, Kardex, Intake/Output, and MAR    Shift worked:  7p-7a     Shift summary and any significant changes:     Patient tolerating care fairly well. Medications given per [facility] and education has been provided. PRN Dilaudid given x3. PRN Zofran given x3. PRN Benadryl x3. Pt is a standby assist to the BR. Hourly rounding completed and pt was able to rest over shift.        Expected LOS: 3  Actual LOS: 6      Deshawn Tamez RN

## 2023-08-17 LAB
ANION GAP SERPL CALC-SCNC: 0 MMOL/L (ref 5–15)
BASOPHILS # BLD: 0.1 K/UL (ref 0–0.1)
BASOPHILS NFR BLD: 1 % (ref 0–1)
BUN SERPL-MCNC: 11 MG/DL (ref 6–20)
BUN/CREAT SERPL: 15 (ref 12–20)
CALCIUM SERPL-MCNC: 9 MG/DL (ref 8.5–10.1)
CHLORIDE SERPL-SCNC: 103 MMOL/L (ref 97–108)
CO2 SERPL-SCNC: 33 MMOL/L (ref 21–32)
CREAT SERPL-MCNC: 0.72 MG/DL (ref 0.55–1.02)
DIFFERENTIAL METHOD BLD: ABNORMAL
EOSINOPHIL # BLD: 0.2 K/UL (ref 0–0.4)
EOSINOPHIL NFR BLD: 4 % (ref 0–7)
ERYTHROCYTE [DISTWIDTH] IN BLOOD BY AUTOMATED COUNT: 14.1 % (ref 11.5–14.5)
GLUCOSE SERPL-MCNC: 85 MG/DL (ref 65–100)
HCT VFR BLD AUTO: 20.7 % (ref 35–47)
HGB BLD-MCNC: 7.2 G/DL (ref 11.5–16)
IMM GRANULOCYTES # BLD AUTO: 0 K/UL (ref 0–0.04)
IMM GRANULOCYTES NFR BLD AUTO: 0 % (ref 0–0.5)
LYMPHOCYTES # BLD: 2.3 K/UL (ref 0.8–3.5)
LYMPHOCYTES NFR BLD: 38 % (ref 12–49)
MCH RBC QN AUTO: 37.3 PG (ref 26–34)
MCHC RBC AUTO-ENTMCNC: 34.8 G/DL (ref 30–36.5)
MCV RBC AUTO: 107.3 FL (ref 80–99)
MONOCYTES # BLD: 1 K/UL (ref 0–1)
MONOCYTES NFR BLD: 16 % (ref 5–13)
NEUTS SEG # BLD: 2.5 K/UL (ref 1.8–8)
NEUTS SEG NFR BLD: 41 % (ref 32–75)
NRBC # BLD: 0.5 K/UL (ref 0–0.01)
NRBC BLD-RTO: 8.3 PER 100 WBC
PLATELET # BLD AUTO: 187 K/UL (ref 150–400)
PMV BLD AUTO: 11.3 FL (ref 8.9–12.9)
POTASSIUM SERPL-SCNC: 4.4 MMOL/L (ref 3.5–5.1)
RBC # BLD AUTO: 1.93 M/UL (ref 3.8–5.2)
RBC MORPH BLD: ABNORMAL
SODIUM SERPL-SCNC: 136 MMOL/L (ref 136–145)
WBC # BLD AUTO: 6.1 K/UL (ref 3.6–11)

## 2023-08-17 PROCEDURE — 6360000002 HC RX W HCPCS: Performed by: EMERGENCY MEDICINE

## 2023-08-17 PROCEDURE — 6370000000 HC RX 637 (ALT 250 FOR IP): Performed by: INTERNAL MEDICINE

## 2023-08-17 PROCEDURE — 99221 1ST HOSP IP/OBS SF/LOW 40: CPT | Performed by: INTERNAL MEDICINE

## 2023-08-17 PROCEDURE — 2500000003 HC RX 250 WO HCPCS: Performed by: INTERNAL MEDICINE

## 2023-08-17 PROCEDURE — 1100000000 HC RM PRIVATE

## 2023-08-17 PROCEDURE — 6370000000 HC RX 637 (ALT 250 FOR IP): Performed by: NURSE PRACTITIONER

## 2023-08-17 PROCEDURE — 94760 N-INVAS EAR/PLS OXIMETRY 1: CPT

## 2023-08-17 PROCEDURE — 6360000002 HC RX W HCPCS: Performed by: INTERNAL MEDICINE

## 2023-08-17 PROCEDURE — 80048 BASIC METABOLIC PNL TOTAL CA: CPT

## 2023-08-17 PROCEDURE — 85025 COMPLETE CBC W/AUTO DIFF WBC: CPT

## 2023-08-17 PROCEDURE — 2700000000 HC OXYGEN THERAPY PER DAY

## 2023-08-17 PROCEDURE — 36415 COLL VENOUS BLD VENIPUNCTURE: CPT

## 2023-08-17 RX ORDER — HYDROMORPHONE HYDROCHLORIDE 1 MG/ML
0.5 INJECTION, SOLUTION INTRAMUSCULAR; INTRAVENOUS; SUBCUTANEOUS EVERY 12 HOURS PRN
Status: DISCONTINUED | OUTPATIENT
Start: 2023-08-17 | End: 2023-08-18

## 2023-08-17 RX ADMIN — HYDROMORPHONE HYDROCHLORIDE 1 MG: 1 INJECTION, SOLUTION INTRAMUSCULAR; INTRAVENOUS; SUBCUTANEOUS at 06:19

## 2023-08-17 RX ADMIN — DIPHENHYDRAMINE HYDROCHLORIDE 25 MG: 25 CAPSULE ORAL at 02:49

## 2023-08-17 RX ADMIN — PANTOPRAZOLE SODIUM 40 MG: 40 TABLET, DELAYED RELEASE ORAL at 08:42

## 2023-08-17 RX ADMIN — ONDANSETRON 4 MG: 2 INJECTION INTRAMUSCULAR; INTRAVENOUS at 10:28

## 2023-08-17 RX ADMIN — ONDANSETRON 4 MG: 2 INJECTION INTRAMUSCULAR; INTRAVENOUS at 06:20

## 2023-08-17 RX ADMIN — FOLIC ACID 1000 MCG: 1 TABLET ORAL at 08:42

## 2023-08-17 RX ADMIN — FLUOXETINE 10 MG: 10 CAPSULE ORAL at 08:42

## 2023-08-17 RX ADMIN — HYDROMORPHONE HYDROCHLORIDE 1 MG: 1 INJECTION, SOLUTION INTRAMUSCULAR; INTRAVENOUS; SUBCUTANEOUS at 10:28

## 2023-08-17 RX ADMIN — HYDROXYUREA 500 MG: 500 CAPSULE ORAL at 08:42

## 2023-08-17 RX ADMIN — CYANOCOBALAMIN TAB 500 MCG 1000 MCG: 500 TAB at 08:42

## 2023-08-17 RX ADMIN — HYDROMORPHONE HYDROCHLORIDE 1 MG: 1 INJECTION, SOLUTION INTRAMUSCULAR; INTRAVENOUS; SUBCUTANEOUS at 02:49

## 2023-08-17 RX ADMIN — HYDROCODONE BITARTRATE AND ACETAMINOPHEN 1 TABLET: 10; 325 TABLET ORAL at 08:42

## 2023-08-17 RX ADMIN — DIPHENHYDRAMINE HYDROCHLORIDE 25 MG: 25 CAPSULE ORAL at 08:42

## 2023-08-17 RX ADMIN — POTASSIUM CHLORIDE AND SODIUM CHLORIDE: 900; 150 INJECTION, SOLUTION INTRAVENOUS at 06:17

## 2023-08-17 RX ADMIN — ENOXAPARIN SODIUM 40 MG: 100 INJECTION SUBCUTANEOUS at 08:42

## 2023-08-17 RX ADMIN — HYDROCODONE BITARTRATE AND ACETAMINOPHEN 1 TABLET: 10; 325 TABLET ORAL at 21:17

## 2023-08-17 RX ADMIN — LOSARTAN POTASSIUM 100 MG: 100 TABLET, FILM COATED ORAL at 08:42

## 2023-08-17 RX ADMIN — ONDANSETRON 4 MG: 2 INJECTION INTRAMUSCULAR; INTRAVENOUS at 18:42

## 2023-08-17 RX ADMIN — ONDANSETRON 4 MG: 2 INJECTION INTRAMUSCULAR; INTRAVENOUS at 14:26

## 2023-08-17 RX ADMIN — ONDANSETRON 4 MG: 2 INJECTION INTRAMUSCULAR; INTRAVENOUS at 02:49

## 2023-08-17 RX ADMIN — HYDROMORPHONE HYDROCHLORIDE 1 MG: 1 INJECTION, SOLUTION INTRAMUSCULAR; INTRAVENOUS; SUBCUTANEOUS at 14:26

## 2023-08-17 RX ADMIN — HYDROCODONE BITARTRATE AND ACETAMINOPHEN 1 TABLET: 10; 325 TABLET ORAL at 12:51

## 2023-08-17 RX ADMIN — HYDROCODONE BITARTRATE AND ACETAMINOPHEN 1 TABLET: 10; 325 TABLET ORAL at 17:12

## 2023-08-17 RX ADMIN — DIPHENHYDRAMINE HYDROCHLORIDE 25 MG: 25 CAPSULE ORAL at 21:18

## 2023-08-17 RX ADMIN — HYDROXYUREA 500 MG: 500 CAPSULE ORAL at 21:17

## 2023-08-17 RX ADMIN — HYDROMORPHONE HYDROCHLORIDE 0.5 MG: 1 INJECTION, SOLUTION INTRAMUSCULAR; INTRAVENOUS; SUBCUTANEOUS at 18:42

## 2023-08-17 ASSESSMENT — PAIN DESCRIPTION - ORIENTATION
ORIENTATION: OTHER (COMMENT)

## 2023-08-17 ASSESSMENT — PAIN DESCRIPTION - LOCATION
LOCATION: OTHER (COMMENT)

## 2023-08-17 ASSESSMENT — PAIN SCALES - GENERAL
PAINLEVEL_OUTOF10: 5
PAINLEVEL_OUTOF10: 3
PAINLEVEL_OUTOF10: 7
PAINLEVEL_OUTOF10: 4
PAINLEVEL_OUTOF10: 4
PAINLEVEL_OUTOF10: 5
PAINLEVEL_OUTOF10: 8
PAINLEVEL_OUTOF10: 7
PAINLEVEL_OUTOF10: 8
PAINLEVEL_OUTOF10: 7

## 2023-08-17 ASSESSMENT — PAIN DESCRIPTION - DESCRIPTORS: DESCRIPTORS: ACHING

## 2023-08-17 NOTE — PROGRESS NOTES
End of Shift Note    Bedside shift change report given to Nico kern (oncoming nurse) by Dari Johnson RN (offgoing nurse). Report included the following information SBAR, Kardex, and MAR    Shift worked:  7p-7a     Shift summary and any significant changes:     Scheduled medications were given, see MAR. Patient was given Benadryl twice, Dilaudid three times and Zofran three times, see PRN MAR.  IV has been flushed and the patient is getting maintenance fluid. Patient had a visitor during my shift. Patient is up ad clifton. Morning lab was done. Patient teaching and routine rounding has been done. Concerns for physician to address:       Zone phone for oncoming shift:          Activity:     Number times ambulated in hallways past shift: 0  Number of times OOB to chair past shift: 0    Cardiac:   Cardiac Monitoring: Yes           Access:  Current line(s): PIV     Genitourinary:   Urinary status: voiding    Respiratory:      Chronic home O2 use?: NO  Incentive spirometer at bedside: NO       GI:     Current diet:  ADULT DIET;  Regular  Passing flatus: YES  Tolerating current diet: YES       Pain Management:   Patient states pain is manageable on current regimen: YES    Skin:     Interventions: increase time out of bed    Patient Safety:  Fall Score:    Interventions: bed/chair alarm       Length of Stay:  Expected LOS: 3  Actual LOS: 7      Dari Johnson RN

## 2023-08-17 NOTE — PROGRESS NOTES
End of Shift Note    Bedside shift change report given to Charley JANG (oncoming nurse) by Naif Cheng RN (offgoing nurse). Report included the following information SBAR, Kardex, Intake/Output, and MAR    Shift worked:  7a - 7p      Shift summary and any significant changes:    Received report from Nico kern RN at 3pm. Pt tolerated care fairly well. Medications given per MAR. PRN zofran and dilaudid given once during my time of having her in my care.        Concerns for physician to address:     Zone phone for oncoming shift:             Naif Cheng RN

## 2023-08-18 VITALS
HEIGHT: 70 IN | OXYGEN SATURATION: 98 % | DIASTOLIC BLOOD PRESSURE: 93 MMHG | RESPIRATION RATE: 18 BRPM | HEART RATE: 72 BPM | BODY MASS INDEX: 26.1 KG/M2 | SYSTOLIC BLOOD PRESSURE: 153 MMHG | WEIGHT: 182.32 LBS | TEMPERATURE: 98.4 F

## 2023-08-18 PROCEDURE — 6360000002 HC RX W HCPCS: Performed by: EMERGENCY MEDICINE

## 2023-08-18 PROCEDURE — 2500000003 HC RX 250 WO HCPCS: Performed by: INTERNAL MEDICINE

## 2023-08-18 PROCEDURE — 6370000000 HC RX 637 (ALT 250 FOR IP): Performed by: NURSE PRACTITIONER

## 2023-08-18 PROCEDURE — 6360000002 HC RX W HCPCS: Performed by: INTERNAL MEDICINE

## 2023-08-18 PROCEDURE — 6370000000 HC RX 637 (ALT 250 FOR IP): Performed by: INTERNAL MEDICINE

## 2023-08-18 PROCEDURE — 99231 SBSQ HOSP IP/OBS SF/LOW 25: CPT | Performed by: INTERNAL MEDICINE

## 2023-08-18 PROCEDURE — 94760 N-INVAS EAR/PLS OXIMETRY 1: CPT

## 2023-08-18 PROCEDURE — 2700000000 HC OXYGEN THERAPY PER DAY

## 2023-08-18 RX ORDER — FENTANYL 75 UG/H
1 PATCH TRANSDERMAL
Qty: 10 PATCH | Refills: 0 | Status: SHIPPED | OUTPATIENT
Start: 2023-08-18 | End: 2023-09-17

## 2023-08-18 RX ADMIN — ENOXAPARIN SODIUM 40 MG: 100 INJECTION SUBCUTANEOUS at 09:33

## 2023-08-18 RX ADMIN — HYDROCODONE BITARTRATE AND ACETAMINOPHEN 1 TABLET: 10; 325 TABLET ORAL at 09:32

## 2023-08-18 RX ADMIN — FLUOXETINE 10 MG: 10 CAPSULE ORAL at 09:31

## 2023-08-18 RX ADMIN — CYANOCOBALAMIN TAB 500 MCG 1000 MCG: 500 TAB at 09:32

## 2023-08-18 RX ADMIN — PANTOPRAZOLE SODIUM 40 MG: 40 TABLET, DELAYED RELEASE ORAL at 09:33

## 2023-08-18 RX ADMIN — DIPHENHYDRAMINE HYDROCHLORIDE 25 MG: 25 CAPSULE ORAL at 06:27

## 2023-08-18 RX ADMIN — ONDANSETRON 4 MG: 2 INJECTION INTRAMUSCULAR; INTRAVENOUS at 06:28

## 2023-08-18 RX ADMIN — HYDROXYUREA 500 MG: 500 CAPSULE ORAL at 09:33

## 2023-08-18 RX ADMIN — HYDROMORPHONE HYDROCHLORIDE 0.5 MG: 1 INJECTION, SOLUTION INTRAMUSCULAR; INTRAVENOUS; SUBCUTANEOUS at 06:28

## 2023-08-18 RX ADMIN — LOSARTAN POTASSIUM 100 MG: 100 TABLET, FILM COATED ORAL at 09:32

## 2023-08-18 RX ADMIN — HYDROCODONE BITARTRATE AND ACETAMINOPHEN 1 TABLET: 10; 325 TABLET ORAL at 14:45

## 2023-08-18 RX ADMIN — FOLIC ACID 1000 MCG: 1 TABLET ORAL at 09:33

## 2023-08-18 ASSESSMENT — PAIN SCALES - GENERAL
PAINLEVEL_OUTOF10: 2
PAINLEVEL_OUTOF10: 3
PAINLEVEL_OUTOF10: 5
PAINLEVEL_OUTOF10: 2

## 2023-08-18 ASSESSMENT — PAIN DESCRIPTION - ORIENTATION
ORIENTATION: LEFT;RIGHT
ORIENTATION: RIGHT;LEFT
ORIENTATION: RIGHT;LEFT

## 2023-08-18 ASSESSMENT — PAIN DESCRIPTION - LOCATION
LOCATION: OTHER (COMMENT)
LOCATION: KNEE

## 2023-08-18 ASSESSMENT — PAIN DESCRIPTION - DESCRIPTORS
DESCRIPTORS: ACHING

## 2023-08-18 NOTE — DISCHARGE INSTRUCTIONS
General Discharge Instructions    Patient ID:  Sammie Mcgee  [id number]  47 y.o.  1960    Patient Instructions          The following personal items were collected during your admission and were returned to you. Take Home Medications     {Medication reconciliation information is now added to the patient's AVS automatically when it is printed. There is no need to use this SmartLink in discharge instructions. Highlight this text and delete it to clear this message}      What to do at Home    Recommended diet: regular diet    Recommended activity: activity as tolerated    Follow-up with Bar Barrios MD  in 3 days. Information obtained by :  I understand that if any problems occur once I am at home I am to contact my physician. I understand and acknowledge receipt of the instructions indicated above.                                                                                                                                            Physician's or R.N.'s Signature                                                                  Date/Time                                                                                                                                              Patient or Representative Signature                                                          Date/Time

## 2023-08-18 NOTE — PROGRESS NOTES
End of Shift Note    Bedside shift change report given to Terry Gustafson (oncoming nurse) by Collette Mahoney RN (offgoing nurse). Report included the following information SBAR, Kardex, and MAR    Shift worked:  7p-7a     Shift summary and any significant changes:     Scheduled medications were given, see MAR. Patient was given Benadryl twice, Dilaudid once and Zofran once, see PRN MAR.  IV has been flushed and the patient is getting maintenance fluid. Patient is up ad clifton. Patient teaching and routine rounding has been done. Concerns for physician to address:       Zone phone for oncoming shift:          Activity:     Number times ambulated in hallways past shift: 0  Number of times OOB to chair past shift: 0    Cardiac:   Cardiac Monitoring: Yes           Access:  Current line(s): PIV     Genitourinary:   Urinary status: voiding    Respiratory:      Chronic home O2 use?: NO  Incentive spirometer at bedside: NO       GI:     Current diet:  ADULT DIET;  Regular  Passing flatus: YES  Tolerating current diet: YES       Pain Management:   Patient states pain is manageable on current regimen: YES    Skin:     Interventions: increase time out of bed    Patient Safety:  Fall Score:    Interventions: bed/chair alarm       Length of Stay:  Expected LOS: 3  Actual LOS: [address], RN

## 2023-08-18 NOTE — PROGRESS NOTES
PCP hospital follow-up transitional care appointment has been scheduled with Dr. Trevor Garcia on 8/24/23 5130. Pending patient discharge.   Shayna Elder, Care Management Assistant

## 2023-08-18 NOTE — CARE COORDINATION
08/18/23 [facility] Discharge   Transition of Care Consult (CM Consult) Discharge [facility] Discharge None   [place] Resource Information Provided? No   Mode of Transport at Discharge Other (see comment)  (family)   Confirm Follow Up Transport Family   Condition of Participation: Discharge Planning   The Plan for Transition of Care is related to the following treatment goals: home with followup appointments     Pt is clear from CM.

## 2023-08-24 ENCOUNTER — OFFICE VISIT (OUTPATIENT)
Facility: CLINIC | Age: 63
End: 2023-08-24

## 2023-08-24 VITALS
BODY MASS INDEX: 26.36 KG/M2 | RESPIRATION RATE: 18 BRPM | DIASTOLIC BLOOD PRESSURE: 86 MMHG | SYSTOLIC BLOOD PRESSURE: 143 MMHG | OXYGEN SATURATION: 99 % | HEIGHT: 70 IN | TEMPERATURE: 99.6 F | WEIGHT: 184.1 LBS | HEART RATE: 96 BPM

## 2023-08-24 DIAGNOSIS — I10 PRIMARY HYPERTENSION: ICD-10-CM

## 2023-08-24 DIAGNOSIS — E66.3 OVERWEIGHT: ICD-10-CM

## 2023-08-24 DIAGNOSIS — Z09 HOSPITAL DISCHARGE FOLLOW-UP: ICD-10-CM

## 2023-08-24 DIAGNOSIS — D57.00 SICKLE CELL PAIN CRISIS (HCC): Primary | ICD-10-CM

## 2023-08-24 DIAGNOSIS — D58.2 HEMOGLOBINOPATHY (HCC): ICD-10-CM

## 2023-08-24 ASSESSMENT — ANXIETY QUESTIONNAIRES
3. WORRYING TOO MUCH ABOUT DIFFERENT THINGS: 0
GAD7 TOTAL SCORE: 0
1. FEELING NERVOUS, ANXIOUS, OR ON EDGE: 0
4. TROUBLE RELAXING: 0
7. FEELING AFRAID AS IF SOMETHING AWFUL MIGHT HAPPEN: 0
5. BEING SO RESTLESS THAT IT IS HARD TO SIT STILL: 0
2. NOT BEING ABLE TO STOP OR CONTROL WORRYING: 0
IF YOU CHECKED OFF ANY PROBLEMS ON THIS QUESTIONNAIRE, HOW DIFFICULT HAVE THESE PROBLEMS MADE IT FOR YOU TO DO YOUR WORK, TAKE CARE OF THINGS AT HOME, OR GET ALONG WITH OTHER PEOPLE: NOT DIFFICULT AT ALL
6. BECOMING EASILY ANNOYED OR IRRITABLE: 0

## 2023-08-24 ASSESSMENT — PATIENT HEALTH QUESTIONNAIRE - PHQ9
SUM OF ALL RESPONSES TO PHQ QUESTIONS 1-9: 0
7. TROUBLE CONCENTRATING ON THINGS, SUCH AS READING THE NEWSPAPER OR WATCHING TELEVISION: 0
SUM OF ALL RESPONSES TO PHQ QUESTIONS 1-9: 0
3. TROUBLE FALLING OR STAYING ASLEEP: 0
8. MOVING OR SPEAKING SO SLOWLY THAT OTHER PEOPLE COULD HAVE NOTICED. OR THE OPPOSITE, BEING SO FIGETY OR RESTLESS THAT YOU HAVE BEEN MOVING AROUND A LOT MORE THAN USUAL: 0
SUM OF ALL RESPONSES TO PHQ QUESTIONS 1-9: 0
10. IF YOU CHECKED OFF ANY PROBLEMS, HOW DIFFICULT HAVE THESE PROBLEMS MADE IT FOR YOU TO DO YOUR WORK, TAKE CARE OF THINGS AT HOME, OR GET ALONG WITH OTHER PEOPLE: 0
2. FEELING DOWN, DEPRESSED OR HOPELESS: 0
4. FEELING TIRED OR HAVING LITTLE ENERGY: 0
1. LITTLE INTEREST OR PLEASURE IN DOING THINGS: 0
6. FEELING BAD ABOUT YOURSELF - OR THAT YOU ARE A FAILURE OR HAVE LET YOURSELF OR YOUR FAMILY DOWN: 0
SUM OF ALL RESPONSES TO PHQ9 QUESTIONS 1 & 2: 0
5. POOR APPETITE OR OVEREATING: 0
SUM OF ALL RESPONSES TO PHQ QUESTIONS 1-9: 0
9. THOUGHTS THAT YOU WOULD BE BETTER OFF DEAD, OR OF HURTING YOURSELF: 0

## 2023-08-25 NOTE — PROGRESS NOTES
1. The patient's painful crisis has improved significantly. She will continue chronic use of her analgesic. 2. 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 3. She has a history of hypertension and blood pressure is excellent. 4. She has lost weight and I congratulated her on this. She is now only overweight and appears to be continuing with the weight reduction.
Rodríguez Pickett is a 63 y.o. female    Chief Complaint   Patient presents with    Follow-Up from Hospital     1. Have you been to the ER, urgent care clinic since your last visit? Hospitalized since your last visit? No    2. Have you seen or consulted any other health care providers outside of the [address] [place] [address] Avenue since your last visit? Include any pap smears or colon screening.  No
comes in for return visit having been hospitalized most recently for painful crisis. She has an SC hemoglobinopathy. Her most recent hospital course was relatively uneventful. No complications occurred and she was in for relatively short time as compared to previous admissions. She is reasonably stable currently. She does need a prescription for Duragesic patches however. She has a past history of primary hypertension and mild depression.
hospital discharge: Yes        Objective:    Patient-Reported Vitals  No data recorded        Syed Britt, was evaluated through a synchronous (real-time) audio-video encounter. The patient (or guardian if applicable) is aware that this is a billable service, which includes applicable co-pays. This Virtual Visit was conducted with patient's (and/or legal guardian's) consent. Patient identification was verified, and a caregiver was present when appropriate. The patient was located at   Provider was located at     [address] [place] [address]! Confirm you are appropriately licensed, registered, or certified to deliver care in the state where the patient is located as indicated above. If you are not or unsure, please re-schedule the visit. Are you appropriately licensed in the patient's state?: Yes       An electronic signature was used to authenticate this note.   --Martina Hutchinson MD

## 2023-08-29 DIAGNOSIS — D57.09 SICKLE CELL DISEASE WITH CRISIS AND OTHER COMPLICATION (HCC): ICD-10-CM

## 2023-08-29 RX ORDER — FENTANYL 75 UG/H
1 PATCH TRANSDERMAL
Qty: 10 PATCH | Refills: 0 | Status: SHIPPED | OUTPATIENT
Start: 2023-08-29 | End: 2023-09-28

## 2023-08-29 RX ORDER — TELMISARTAN 40 MG/1
40 TABLET ORAL NIGHTLY
Qty: 30 TABLET | Refills: 11 | Status: SHIPPED | OUTPATIENT
Start: 2023-08-29

## 2023-09-01 DIAGNOSIS — D57.00 SICKLE CELL PAIN CRISIS (HCC): Primary | ICD-10-CM

## 2023-09-01 RX ORDER — HYDROCODONE BITARTRATE AND ACETAMINOPHEN 10; 325 MG/1; MG/1
1 TABLET ORAL EVERY 6 HOURS PRN
Qty: 120 TABLET | Refills: 0 | Status: SHIPPED | OUTPATIENT
Start: 2023-09-01 | End: 2023-10-01

## 2023-10-04 ENCOUNTER — OFFICE VISIT (OUTPATIENT)
Facility: CLINIC | Age: 63
End: 2023-10-04
Payer: MEDICARE

## 2023-10-04 VITALS
HEIGHT: 70 IN | HEART RATE: 87 BPM | OXYGEN SATURATION: 100 % | SYSTOLIC BLOOD PRESSURE: 147 MMHG | DIASTOLIC BLOOD PRESSURE: 89 MMHG | BODY MASS INDEX: 26.45 KG/M2 | WEIGHT: 184.8 LBS | RESPIRATION RATE: 18 BRPM | TEMPERATURE: 98.5 F

## 2023-10-04 DIAGNOSIS — G56.01 CARPAL TUNNEL SYNDROME OF RIGHT WRIST: Primary | ICD-10-CM

## 2023-10-04 PROCEDURE — 1036F TOBACCO NON-USER: CPT | Performed by: INTERNAL MEDICINE

## 2023-10-04 PROCEDURE — 3017F COLORECTAL CA SCREEN DOC REV: CPT | Performed by: INTERNAL MEDICINE

## 2023-10-04 PROCEDURE — 96372 THER/PROPH/DIAG INJ SC/IM: CPT | Performed by: INTERNAL MEDICINE

## 2023-10-04 PROCEDURE — 3077F SYST BP >= 140 MM HG: CPT | Performed by: INTERNAL MEDICINE

## 2023-10-04 PROCEDURE — 3079F DIAST BP 80-89 MM HG: CPT | Performed by: INTERNAL MEDICINE

## 2023-10-04 PROCEDURE — G8419 CALC BMI OUT NRM PARAM NOF/U: HCPCS | Performed by: INTERNAL MEDICINE

## 2023-10-04 PROCEDURE — G8427 DOCREV CUR MEDS BY ELIG CLIN: HCPCS | Performed by: INTERNAL MEDICINE

## 2023-10-04 PROCEDURE — G8484 FLU IMMUNIZE NO ADMIN: HCPCS | Performed by: INTERNAL MEDICINE

## 2023-10-04 PROCEDURE — 99212 OFFICE O/P EST SF 10 MIN: CPT | Performed by: INTERNAL MEDICINE

## 2023-10-04 RX ORDER — TRIAMCINOLONE ACETONIDE 40 MG/ML
40 INJECTION, SUSPENSION INTRA-ARTICULAR; INTRAMUSCULAR ONCE
Status: COMPLETED | OUTPATIENT
Start: 2023-10-04 | End: 2023-10-04

## 2023-10-04 RX ADMIN — TRIAMCINOLONE ACETONIDE 40 MG: 40 INJECTION, SUSPENSION INTRA-ARTICULAR; INTRAMUSCULAR at 12:45

## 2023-10-04 ASSESSMENT — ANXIETY QUESTIONNAIRES
GAD7 TOTAL SCORE: 0
1. FEELING NERVOUS, ANXIOUS, OR ON EDGE: 0
5. BEING SO RESTLESS THAT IT IS HARD TO SIT STILL: 0
7. FEELING AFRAID AS IF SOMETHING AWFUL MIGHT HAPPEN: 0
6. BECOMING EASILY ANNOYED OR IRRITABLE: 0
2. NOT BEING ABLE TO STOP OR CONTROL WORRYING: 0
IF YOU CHECKED OFF ANY PROBLEMS ON THIS QUESTIONNAIRE, HOW DIFFICULT HAVE THESE PROBLEMS MADE IT FOR YOU TO DO YOUR WORK, TAKE CARE OF THINGS AT HOME, OR GET ALONG WITH OTHER PEOPLE: NOT DIFFICULT AT ALL
4. TROUBLE RELAXING: 0
3. WORRYING TOO MUCH ABOUT DIFFERENT THINGS: 0

## 2023-10-04 ASSESSMENT — PATIENT HEALTH QUESTIONNAIRE - PHQ9
2. FEELING DOWN, DEPRESSED OR HOPELESS: 0
SUM OF ALL RESPONSES TO PHQ QUESTIONS 1-9: 0
1. LITTLE INTEREST OR PLEASURE IN DOING THINGS: 0
SUM OF ALL RESPONSES TO PHQ QUESTIONS 1-9: 0
SUM OF ALL RESPONSES TO PHQ9 QUESTIONS 1 & 2: 0
SUM OF ALL RESPONSES TO PHQ QUESTIONS 1-9: 0
SUM OF ALL RESPONSES TO PHQ QUESTIONS 1-9: 0

## 2023-10-04 NOTE — PROGRESS NOTES
[facility]  [address] [place] [id number]  Phone:  [phone]  Fax: [phone]           Chief Complaint   Patient presents with    Wrist Pain     Patient here for right wrist pain. Reports pain 4/10. Requesting cortisone injection today. .     SUBJECTIVE:    Enrrique Garza is a 66 y.o. female comes in complaining of paresthesias and numbness of her right hand involving the first 3 digits. This has been getting progressively worse and now the patient would like to have an injection for symptomatic carpal tunnel syndrome. Current Outpatient Medications   Medication Sig Dispense Refill    telmisartan (MICARDIS) 40 MG tablet Take 1 tablet by mouth nightly 30 tablet 11    vitamin B-12 (CYANOCOBALAMIN) 1000 MCG tablet Take 1 tablet by mouth daily      FLUoxetine (PROZAC) 10 MG capsule Take by mouth daily      folic acid (FOLVITE) 1 MG tablet TAKE 1 TABLET BY MOUTH DAILY      hydrocortisone 1 % cream Apply topically 2 times daily as needed      hydroxyurea (HYDREA) 500 MG chemo capsule Take by mouth 2 times daily      ondansetron (ZOFRAN-ODT) 4 MG disintegrating tablet Take by mouth every 8 hours as needed       No current facility-administered medications for this visit.      Past Medical History:   Diagnosis Date    Anemia     SC hemoglobinopathy    Chronic pain     Depression     Hypertension     Liver disease     hepatitis C; pt reports \"cured\"    Sickle cell disease ([address])      Past Surgical History:   Procedure Laterality Date    BREAST BIOPSY Right 05/2007    negative    BREAST SURGERY      2 cysts removed from R breast    CHEST SURGERY      status post thor for removal of a benign     CHOLECYSTECTOMY      ORTHOPEDIC SURGERY      bony curettage of an ostial myelitis focus     Allergies   Allergen Reactions    Hydromorphone Itching     Can tolerate with benadryl and ondansetron    Oxycodone-Acetaminophen Itching           OBJECTIVE:  BP (!) 147/89 (Site: Left Upper

## 2023-10-05 DIAGNOSIS — D57.00 SICKLE CELL PAIN CRISIS (HCC): Primary | ICD-10-CM

## 2023-10-05 RX ORDER — HYDROCODONE BITARTRATE AND ACETAMINOPHEN 10; 325 MG/1; MG/1
1 TABLET ORAL EVERY 6 HOURS PRN
Qty: 120 TABLET | Refills: 0 | Status: SHIPPED | OUTPATIENT
Start: 2023-10-05 | End: 2023-11-04

## 2023-10-17 DIAGNOSIS — D57.00 SICKLE CELL PAIN CRISIS (HCC): ICD-10-CM

## 2023-10-17 RX ORDER — HYDROCODONE BITARTRATE AND ACETAMINOPHEN 10; 325 MG/1; MG/1
1 TABLET ORAL EVERY 6 HOURS PRN
Qty: 120 TABLET | Refills: 0 | Status: SHIPPED | OUTPATIENT
Start: 2023-10-17 | End: 2023-11-16

## 2023-11-13 ENCOUNTER — TELEPHONE (OUTPATIENT)
Facility: CLINIC | Age: 63
End: 2023-11-13

## 2023-11-13 NOTE — TELEPHONE ENCOUNTER
[facility] -    Rayna Lynne  [birth year]   [phone]        Need Fentanyl Patch  75mcg / hr      Ruma lu  [place]  [phone]

## 2023-12-01 RX ORDER — HYDROXYUREA 500 MG/1
12 CAPSULE ORAL 2 TIMES DAILY
Qty: 60 CAPSULE | Refills: 11 | Status: SHIPPED | OUTPATIENT
Start: 2023-12-01

## 2023-12-04 DIAGNOSIS — D57.00 SICKLE CELL DISEASE WITH CRISIS (HCC): Primary | ICD-10-CM

## 2023-12-06 RX ORDER — FENTANYL 75 UG/1
1 PATCH TRANSDERMAL
Qty: 10 PATCH | Refills: 0 | Status: SHIPPED | OUTPATIENT
Start: 2023-12-06 | End: 2024-01-05

## 2023-12-15 DIAGNOSIS — D57.00 SICKLE CELL DISEASE WITH CRISIS (HCC): Primary | ICD-10-CM

## 2023-12-16 RX ORDER — HYDROCODONE BITARTRATE AND ACETAMINOPHEN 10; 325 MG/1; MG/1
1 TABLET ORAL EVERY 6 HOURS PRN
Qty: 120 TABLET | Refills: 0 | Status: SHIPPED | OUTPATIENT
Start: 2023-12-16 | End: 2024-01-15

## 2024-01-04 RX ORDER — PROMETHAZINE HYDROCHLORIDE 25 MG/1
25 TABLET ORAL EVERY 8 HOURS PRN
Qty: 90 TABLET | Refills: 3 | Status: SHIPPED | OUTPATIENT
Start: 2024-01-04

## 2024-01-08 ENCOUNTER — OFFICE VISIT (OUTPATIENT)
Facility: CLINIC | Age: 64
End: 2024-01-08
Payer: MEDICARE

## 2024-01-08 VITALS
BODY MASS INDEX: 24.62 KG/M2 | DIASTOLIC BLOOD PRESSURE: 75 MMHG | OXYGEN SATURATION: 99 % | HEIGHT: 70 IN | WEIGHT: 172 LBS | SYSTOLIC BLOOD PRESSURE: 109 MMHG | RESPIRATION RATE: 16 BRPM | TEMPERATURE: 99 F | HEART RATE: 94 BPM

## 2024-01-08 DIAGNOSIS — I10 PRIMARY HYPERTENSION: ICD-10-CM

## 2024-01-08 DIAGNOSIS — Z00.00 MEDICARE ANNUAL WELLNESS VISIT, SUBSEQUENT: ICD-10-CM

## 2024-01-08 DIAGNOSIS — E78.5 DYSLIPIDEMIA: ICD-10-CM

## 2024-01-08 DIAGNOSIS — G62.9 NEUROPATHY: ICD-10-CM

## 2024-01-08 DIAGNOSIS — F32.9 REACTIVE DEPRESSION: ICD-10-CM

## 2024-01-08 DIAGNOSIS — D57.00 SICKLE CELL DISEASE WITH CRISIS (HCC): Primary | ICD-10-CM

## 2024-01-08 DIAGNOSIS — H93.A9 PULSATILE TINNITUS: ICD-10-CM

## 2024-01-08 DIAGNOSIS — R63.4 WEIGHT LOSS: ICD-10-CM

## 2024-01-08 LAB
ALBUMIN SERPL-MCNC: 3.6 G/DL (ref 3.5–5)
ALBUMIN/GLOB SERPL: 0.7 (ref 1.1–2.2)
ALP SERPL-CCNC: 87 U/L (ref 45–117)
ALT SERPL-CCNC: 22 U/L (ref 12–78)
ANION GAP SERPL CALC-SCNC: 4 MMOL/L (ref 5–15)
AST SERPL-CCNC: 28 U/L (ref 15–37)
BASOPHILS # BLD: 0.1 K/UL (ref 0–0.1)
BASOPHILS NFR BLD: 1 % (ref 0–1)
BILIRUB SERPL-MCNC: 1.6 MG/DL (ref 0.2–1)
BUN SERPL-MCNC: 9 MG/DL (ref 6–20)
BUN/CREAT SERPL: 10 (ref 12–20)
CALCIUM SERPL-MCNC: 8.3 MG/DL (ref 8.5–10.1)
CHLORIDE SERPL-SCNC: 108 MMOL/L (ref 97–108)
CHOLEST SERPL-MCNC: 101 MG/DL
CO2 SERPL-SCNC: 26 MMOL/L (ref 21–32)
CREAT SERPL-MCNC: 0.89 MG/DL (ref 0.55–1.02)
CRP SERPL HS-MCNC: 1.3 MG/L
DIFFERENTIAL METHOD BLD: ABNORMAL
EOSINOPHIL # BLD: 0.1 K/UL (ref 0–0.4)
EOSINOPHIL NFR BLD: 2 % (ref 0–7)
ERYTHROCYTE [DISTWIDTH] IN BLOOD BY AUTOMATED COUNT: 14.3 % (ref 11.5–14.5)
GLOBULIN SER CALC-MCNC: 5.1 G/DL (ref 2–4)
GLUCOSE SERPL-MCNC: 106 MG/DL (ref 65–100)
HCT VFR BLD AUTO: 24.1 % (ref 35–47)
HDLC SERPL-MCNC: 41 MG/DL
HDLC SERPL: 2.5 (ref 0–5)
HGB BLD-MCNC: 8.6 G/DL (ref 11.5–16)
IMM GRANULOCYTES # BLD AUTO: 0 K/UL (ref 0–0.04)
IMM GRANULOCYTES NFR BLD AUTO: 0 % (ref 0–0.5)
LDLC SERPL CALC-MCNC: 47.8 MG/DL (ref 0–100)
LYMPHOCYTES # BLD: 1.9 K/UL (ref 0.8–3.5)
LYMPHOCYTES NFR BLD: 30 % (ref 12–49)
MCH RBC QN AUTO: 40.2 PG (ref 26–34)
MCHC RBC AUTO-ENTMCNC: 35.7 G/DL (ref 30–36.5)
MCV RBC AUTO: 112.6 FL (ref 80–99)
MONOCYTES # BLD: 1.3 K/UL (ref 0–1)
MONOCYTES NFR BLD: 20 % (ref 5–13)
NEUTS SEG # BLD: 2.9 K/UL (ref 1.8–8)
NEUTS SEG NFR BLD: 47 % (ref 32–75)
NRBC # BLD: 0.47 K/UL (ref 0–0.01)
NRBC BLD-RTO: 7.4 PER 100 WBC
PLATELET # BLD AUTO: 246 K/UL (ref 150–400)
PMV BLD AUTO: 11.7 FL (ref 8.9–12.9)
POTASSIUM SERPL-SCNC: 3.5 MMOL/L (ref 3.5–5.1)
PROT SERPL-MCNC: 8.7 G/DL (ref 6.4–8.2)
RBC # BLD AUTO: 2.14 M/UL (ref 3.8–5.2)
RBC MORPH BLD: ABNORMAL
SODIUM SERPL-SCNC: 138 MMOL/L (ref 136–145)
TRIGL SERPL-MCNC: 61 MG/DL
TSH SERPL DL<=0.05 MIU/L-ACNC: 0.81 UIU/ML (ref 0.36–3.74)
VLDLC SERPL CALC-MCNC: 12.2 MG/DL
WBC # BLD AUTO: 6.3 K/UL (ref 3.6–11)

## 2024-01-08 PROCEDURE — 3078F DIAST BP <80 MM HG: CPT | Performed by: INTERNAL MEDICINE

## 2024-01-08 PROCEDURE — G8484 FLU IMMUNIZE NO ADMIN: HCPCS | Performed by: INTERNAL MEDICINE

## 2024-01-08 PROCEDURE — G0439 PPPS, SUBSEQ VISIT: HCPCS | Performed by: INTERNAL MEDICINE

## 2024-01-08 PROCEDURE — 3074F SYST BP LT 130 MM HG: CPT | Performed by: INTERNAL MEDICINE

## 2024-01-08 PROCEDURE — 3017F COLORECTAL CA SCREEN DOC REV: CPT | Performed by: INTERNAL MEDICINE

## 2024-01-08 RX ORDER — PROMETHAZINE HYDROCHLORIDE 25 MG/1
25 TABLET ORAL EVERY 8 HOURS PRN
Qty: 60 TABLET | Refills: 3 | Status: SHIPPED | OUTPATIENT
Start: 2024-01-08

## 2024-01-08 ASSESSMENT — PATIENT HEALTH QUESTIONNAIRE - PHQ9
SUM OF ALL RESPONSES TO PHQ QUESTIONS 1-9: 0
10. IF YOU CHECKED OFF ANY PROBLEMS, HOW DIFFICULT HAVE THESE PROBLEMS MADE IT FOR YOU TO DO YOUR WORK, TAKE CARE OF THINGS AT HOME, OR GET ALONG WITH OTHER PEOPLE: 0
SUM OF ALL RESPONSES TO PHQ QUESTIONS 1-9: 0
7. TROUBLE CONCENTRATING ON THINGS, SUCH AS READING THE NEWSPAPER OR WATCHING TELEVISION: 0
SUM OF ALL RESPONSES TO PHQ9 QUESTIONS 1 & 2: 0
5. POOR APPETITE OR OVEREATING: 0
4. FEELING TIRED OR HAVING LITTLE ENERGY: 0
2. FEELING DOWN, DEPRESSED OR HOPELESS: 0
9. THOUGHTS THAT YOU WOULD BE BETTER OFF DEAD, OR OF HURTING YOURSELF: 0
3. TROUBLE FALLING OR STAYING ASLEEP: 0
SUM OF ALL RESPONSES TO PHQ QUESTIONS 1-9: 0
1. LITTLE INTEREST OR PLEASURE IN DOING THINGS: 0
SUM OF ALL RESPONSES TO PHQ QUESTIONS 1-9: 0
6. FEELING BAD ABOUT YOURSELF - OR THAT YOU ARE A FAILURE OR HAVE LET YOURSELF OR YOUR FAMILY DOWN: 0
8. MOVING OR SPEAKING SO SLOWLY THAT OTHER PEOPLE COULD HAVE NOTICED. OR THE OPPOSITE, BEING SO FIGETY OR RESTLESS THAT YOU HAVE BEEN MOVING AROUND A LOT MORE THAN USUAL: 0

## 2024-01-10 LAB — APO B SERPL-MCNC: 49 MG/DL

## 2024-01-16 RX ORDER — FLUOXETINE 10 MG/1
10 CAPSULE ORAL DAILY
Qty: 90 CAPSULE | Refills: 3 | Status: SHIPPED | OUTPATIENT
Start: 2024-01-16

## 2024-01-17 DIAGNOSIS — L73.0 KELOID ACNE SCAR: Primary | ICD-10-CM

## 2024-01-17 RX ORDER — BENZOCAINE/MENTHOL 6 MG-10 MG
LOZENGE MUCOUS MEMBRANE 2 TIMES DAILY
Qty: 30 G | Refills: 5 | Status: SHIPPED | OUTPATIENT
Start: 2024-01-17

## 2024-01-17 RX ORDER — HYDROCODONE BITARTRATE AND ACETAMINOPHEN 10; 325 MG/1; MG/1
1 TABLET ORAL EVERY 6 HOURS PRN
Qty: 120 TABLET | Refills: 0 | Status: SHIPPED | OUTPATIENT
Start: 2024-01-17 | End: 2024-02-16

## 2024-01-18 NOTE — PROGRESS NOTES
1. The patient has sickle cell disease and possibly a mild crisis.  She does have analgesics, which help significantly.    2. She does have mild pulsatile tinnitus, which she has previously had.  3. Blood pressure is excellent, no adjustments are made.  4. She has a history of depression and has been doing quite well.  She appears to be a bit depressed today, but I could not extract anything from her, although she states that she is feeling fine.  5. She has a very mild neuropathic sensation in the lower extremities.  6. Interestingly, she has lost weight.  She admits that her appetite is not great, but she is eating what she feels is adequate at this point.  7. She has mild dyslipidemia, but overall cardiovascular risk is relatively low other than risk factor created by her sickle cell disease.    
Chief Complaint   Patient presents with    Foot Pain     Patient is here for a tingling and burning in both feet.     Cold Exposure     Patient is here for a a cold. Patient states she tested negative for COVID twice. Per patient states she has a cough, mucus, and body aches. Patient states she has had these symptoms for over 2 weeks.      \"Have you been to the ER, urgent care clinic since your last visit?  Hospitalized since your last visit?\"    NO    “Have you seen or consulted any other health care providers outside of [facility] since your last visit?”    NO       Have you had a mammogram?”   NO     “Have you had a pap smear?”    NO      
Comes in for return visit stating that she is doing reasonably well. She has lost 20 pounds since I last saw her.  She has had no GI symptoms other than occasional nausea, but this is oftentimes the case when she takes her narcotics.  As a result, she takes Promethazine.      She notes paresthesias and numbness of her feet.      She developed upper respiratory infection several weeks ago and has been left with a persistent cough.  The cough is nonproductive and is typically worse nocturnally.    
daily activities because of your eyesight?: No  Have you had an eye exam within the past year?: (!) No  No results found.    Interventions:    Pending      Advanced Directives:  Do you have a Living Will?: (!) No    Intervention:                       Objective   Vitals:    01/08/24 0902   BP: 109/75   Pulse: 94   Resp: 16   Temp: 99 °F (37.2 °C)   SpO2: 99%   Weight: 78 kg (172 lb)   Height: 1.778 m (5' 10\")      Body mass index is 24.68 kg/m².               Allergies   Allergen Reactions    Hydromorphone Itching     Can tolerate with benadryl and ondansetron    Oxycodone-Acetaminophen Itching     Prior to Visit Medications    Medication Sig Taking? Authorizing Provider   FLUoxetine (PROZAC) 10 MG capsule Take 1 capsule by mouth daily Yes Catrachito Carballo MD   promethazine (PHENERGAN) 25 MG tablet Take 1 tablet by mouth every 8 hours as needed for Nausea for nausea Yes Catrachito Carballo MD   hydroxyurea (HYDREA) 500 MG chemo capsule Take 1 capsule by mouth 2 times daily Yes Catrachito Carballo MD   telmisartan (MICARDIS) 40 MG tablet Take 1 tablet by mouth nightly Yes Catrachito Carballo MD   vitamin B-12 (CYANOCOBALAMIN) 1000 MCG tablet Take 1 tablet by mouth daily Yes Provider, MD Prince   folic acid (FOLVITE) 1 MG tablet TAKE 1 TABLET BY MOUTH DAILY Yes Automatic Reconciliation, Ar   hydrocortisone 1 % cream Apply topically 2 times daily as needed Yes Automatic Reconciliation, Ar   ondansetron (ZOFRAN-ODT) 4 MG disintegrating tablet Take by mouth every 8 hours as needed Yes Automatic Reconciliation, Ar       CareTeam (Including outside providers/suppliers regularly involved in providing care):   Patient Care Team:  Catrachito Carballo MD as PCP - General  Catrachito Carballo MD as PCP - Empaneled Provider     Reviewed and updated this visit:  Allergies  Med Hx  Surg Hx  Soc Hx  Fam Hx

## 2024-02-08 ENCOUNTER — OFFICE VISIT (OUTPATIENT)
Facility: CLINIC | Age: 64
End: 2024-02-08

## 2024-02-08 VITALS
BODY MASS INDEX: 25 KG/M2 | RESPIRATION RATE: 16 BRPM | WEIGHT: 174.6 LBS | DIASTOLIC BLOOD PRESSURE: 89 MMHG | TEMPERATURE: 99.1 F | HEIGHT: 70 IN | HEART RATE: 88 BPM | SYSTOLIC BLOOD PRESSURE: 166 MMHG | OXYGEN SATURATION: 99 %

## 2024-02-08 DIAGNOSIS — F11.20 OPIOID DEPENDENCE WITH CURRENT USE (HCC): ICD-10-CM

## 2024-02-08 DIAGNOSIS — D57.00 SICKLE CELL DISEASE WITH CRISIS (HCC): ICD-10-CM

## 2024-02-08 DIAGNOSIS — F32.9 REACTIVE DEPRESSION: Primary | ICD-10-CM

## 2024-02-08 DIAGNOSIS — I10 PRIMARY HYPERTENSION: ICD-10-CM

## 2024-02-08 SDOH — ECONOMIC STABILITY: INCOME INSECURITY: HOW HARD IS IT FOR YOU TO PAY FOR THE VERY BASICS LIKE FOOD, HOUSING, MEDICAL CARE, AND HEATING?: NOT HARD AT ALL

## 2024-02-08 SDOH — ECONOMIC STABILITY: HOUSING INSECURITY
IN THE LAST 12 MONTHS, WAS THERE A TIME WHEN YOU DID NOT HAVE A STEADY PLACE TO SLEEP OR SLEPT IN A SHELTER (INCLUDING NOW)?: NO

## 2024-02-08 SDOH — ECONOMIC STABILITY: FOOD INSECURITY: WITHIN THE PAST 12 MONTHS, THE FOOD YOU BOUGHT JUST DIDN'T LAST AND YOU DIDN'T HAVE MONEY TO GET MORE.: NEVER TRUE

## 2024-02-08 SDOH — ECONOMIC STABILITY: FOOD INSECURITY: WITHIN THE PAST 12 MONTHS, YOU WORRIED THAT YOUR FOOD WOULD RUN OUT BEFORE YOU GOT MONEY TO BUY MORE.: NEVER TRUE

## 2024-02-08 ASSESSMENT — ANXIETY QUESTIONNAIRES
3. WORRYING TOO MUCH ABOUT DIFFERENT THINGS: 0
5. BEING SO RESTLESS THAT IT IS HARD TO SIT STILL: 0
2. NOT BEING ABLE TO STOP OR CONTROL WORRYING: 0
GAD7 TOTAL SCORE: 0
7. FEELING AFRAID AS IF SOMETHING AWFUL MIGHT HAPPEN: 0
IF YOU CHECKED OFF ANY PROBLEMS ON THIS QUESTIONNAIRE, HOW DIFFICULT HAVE THESE PROBLEMS MADE IT FOR YOU TO DO YOUR WORK, TAKE CARE OF THINGS AT HOME, OR GET ALONG WITH OTHER PEOPLE: NOT DIFFICULT AT ALL
4. TROUBLE RELAXING: 0
1. FEELING NERVOUS, ANXIOUS, OR ON EDGE: 0
6. BECOMING EASILY ANNOYED OR IRRITABLE: 0

## 2024-02-08 ASSESSMENT — PATIENT HEALTH QUESTIONNAIRE - PHQ9
6. FEELING BAD ABOUT YOURSELF - OR THAT YOU ARE A FAILURE OR HAVE LET YOURSELF OR YOUR FAMILY DOWN: 0
SUM OF ALL RESPONSES TO PHQ QUESTIONS 1-9: 0
3. TROUBLE FALLING OR STAYING ASLEEP: 0
SUM OF ALL RESPONSES TO PHQ9 QUESTIONS 1 & 2: 0
SUM OF ALL RESPONSES TO PHQ QUESTIONS 1-9: 0
9. THOUGHTS THAT YOU WOULD BE BETTER OFF DEAD, OR OF HURTING YOURSELF: 0
8. MOVING OR SPEAKING SO SLOWLY THAT OTHER PEOPLE COULD HAVE NOTICED. OR THE OPPOSITE, BEING SO FIGETY OR RESTLESS THAT YOU HAVE BEEN MOVING AROUND A LOT MORE THAN USUAL: 0
10. IF YOU CHECKED OFF ANY PROBLEMS, HOW DIFFICULT HAVE THESE PROBLEMS MADE IT FOR YOU TO DO YOUR WORK, TAKE CARE OF THINGS AT HOME, OR GET ALONG WITH OTHER PEOPLE: 0
SUM OF ALL RESPONSES TO PHQ QUESTIONS 1-9: 0
4. FEELING TIRED OR HAVING LITTLE ENERGY: 0
SUM OF ALL RESPONSES TO PHQ QUESTIONS 1-9: 0
1. LITTLE INTEREST OR PLEASURE IN DOING THINGS: 0
2. FEELING DOWN, DEPRESSED OR HOPELESS: 0
5. POOR APPETITE OR OVEREATING: 0
7. TROUBLE CONCENTRATING ON THINGS, SUCH AS READING THE NEWSPAPER OR WATCHING TELEVISION: 0
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
## 2024-02-08 NOTE — PROGRESS NOTES
[facility]  [address] [place] [address]  [address]  [facility] [address]  Phone:  [phone]  Fax: [phone]       Chief Complaint   Patient presents with    Follow-up    Hypertension   .      SUBJECTIVE:    Rayna Lynne is a 63 y.o. female  Dictation on: 02/08/2024 12:27 PM by: KATHARINE GAITAN [[id number]]          Current Outpatient Medications   Medication Sig Dispense Refill    hydrocortisone 1 % cream Apply topically 2 times daily 30 g 5    HYDROcodone-acetaminophen (NORCO)  MG per tablet Take 1 tablet by mouth every 6 hours as needed for Pain for up to 30 days. Intended supply: 30 days. Dx.d57.00 Max Daily Amount: 4 tablets 120 tablet 0    FLUoxetine (PROZAC) 10 MG capsule Take 1 capsule by mouth daily 90 capsule 3    promethazine (PHENERGAN) 25 MG tablet Take 1 tablet by mouth every 8 hours as needed for Nausea for nausea 60 tablet 3    hydroxyurea (HYDREA) 500 MG chemo capsule Take 1 capsule by mouth 2 times daily 60 capsule 11    telmisartan (MICARDIS) 40 MG tablet Take 1 tablet by mouth nightly 30 tablet 11    vitamin B-12 (CYANOCOBALAMIN) 1000 MCG tablet Take 1 tablet by mouth daily      folic acid (FOLVITE) 1 MG tablet TAKE 1 TABLET BY MOUTH DAILY      ondansetron (ZOFRAN-ODT) 4 MG disintegrating tablet Take by mouth every 8 hours as needed       No current facility-administered medications for this visit.     Past Medical History:   Diagnosis Date    Anemia     SC hemoglobinopathy    Chronic pain     Depression     Hypertension     Liver disease     hepatitis C; pt reports \"cured\"    Sickle cell disease (HCC)      Past Surgical History:   Procedure Laterality Date    BREAST BIOPSY Right 05/2007    negative    BREAST SURGERY      2 cysts removed from R breast    CHEST SURGERY      status post thor for removal of a benign     CHOLECYSTECTOMY      ORTHOPEDIC SURGERY      bony curettage of an ostial myelitis focus     Allergies   Allergen Reactions    Hydromorphone Itching

## 2024-02-08 NOTE — PROGRESS NOTES
Chief Complaint   Patient presents with    Follow-up    Hypertension     \"Have you been to the ER, urgent care clinic since your last visit?  Hospitalized since your last visit?\"    NO    “Have you seen or consulted any other health care providers outside of [facility] since your last visit?”    NO       Have you had a mammogram?”   NO     “Have you had a pap smear?”    NO

## 2024-02-11 PROBLEM — F11.20 OPIOID DEPENDENCE WITH CURRENT USE (HCC): Status: ACTIVE | Noted: 2024-02-11

## 2024-02-15 DIAGNOSIS — L73.0 KELOID ACNE SCAR: ICD-10-CM

## 2024-02-16 RX ORDER — HYDROCODONE BITARTRATE AND ACETAMINOPHEN 10; 325 MG/1; MG/1
1 TABLET ORAL EVERY 6 HOURS PRN
Qty: 120 TABLET | Refills: 0 | Status: SHIPPED | OUTPATIENT
Start: 2024-02-16 | End: 2024-03-17

## 2024-03-05 DIAGNOSIS — D57.00 SICKLE CELL DISEASE WITH CRISIS (HCC): Primary | ICD-10-CM

## 2024-03-05 RX ORDER — FENTANYL 75 UG/1
1 PATCH TRANSDERMAL
COMMUNITY
End: 2024-03-05 | Stop reason: SDUPTHER

## 2024-03-06 RX ORDER — FENTANYL 75 UG/1
1 PATCH TRANSDERMAL
Qty: 10 PATCH | Refills: 0 | Status: SHIPPED | OUTPATIENT
Start: 2024-03-06 | End: 2024-04-05

## 2024-03-08 ENCOUNTER — OFFICE VISIT (OUTPATIENT)
Facility: CLINIC | Age: 64
End: 2024-03-08

## 2024-03-08 VITALS
OXYGEN SATURATION: 98 % | DIASTOLIC BLOOD PRESSURE: 80 MMHG | TEMPERATURE: 99.5 F | WEIGHT: 182.6 LBS | SYSTOLIC BLOOD PRESSURE: 158 MMHG | RESPIRATION RATE: 16 BRPM | BODY MASS INDEX: 26.14 KG/M2 | HEART RATE: 89 BPM | HEIGHT: 70 IN

## 2024-03-08 DIAGNOSIS — D57.00 SICKLE CELL DISEASE WITH CRISIS (HCC): Primary | ICD-10-CM

## 2024-03-08 DIAGNOSIS — F32.A ANXIETY AND DEPRESSION: ICD-10-CM

## 2024-03-08 DIAGNOSIS — M65.9 SYNOVITIS OF RIGHT WRIST: ICD-10-CM

## 2024-03-08 DIAGNOSIS — M67.40 GANGLION CYST: ICD-10-CM

## 2024-03-08 DIAGNOSIS — F41.9 ANXIETY AND DEPRESSION: ICD-10-CM

## 2024-03-08 DIAGNOSIS — I10 PRIMARY HYPERTENSION: ICD-10-CM

## 2024-03-08 NOTE — PROGRESS NOTES
Chief Complaint   Patient presents with    Weight Management     Patient is here for a follow up.      \"Have you been to the ER, urgent care clinic since your last visit?  Hospitalized since your last visit?\"    NO    “Have you seen or consulted any other health care providers outside of [facility] since your last visit?”    NO       Have you had a mammogram?”   NO     “Have you had a pap smear?”    NO

## 2024-03-09 PROBLEM — M67.40 GANGLION CYST: Status: ACTIVE | Noted: 2024-03-09

## 2024-03-09 PROBLEM — M65.931 SYNOVITIS OF RIGHT WRIST: Status: ACTIVE | Noted: 2024-03-09

## 2024-03-09 PROBLEM — M65.9 SYNOVITIS OF RIGHT WRIST: Status: ACTIVE | Noted: 2024-03-09

## 2024-03-09 RX ORDER — TELMISARTAN 80 MG/1
80 TABLET ORAL NIGHTLY
Qty: 30 TABLET | Refills: 11 | Status: SHIPPED | OUTPATIENT
Start: 2024-03-09

## 2024-03-19 DIAGNOSIS — D57.00 SICKLE CELL PAIN CRISIS (HCC): Primary | ICD-10-CM

## 2024-03-20 RX ORDER — HYDROCODONE BITARTRATE AND ACETAMINOPHEN 10; 325 MG/1; MG/1
1 TABLET ORAL EVERY 6 HOURS PRN
Qty: 120 TABLET | Refills: 0 | Status: SHIPPED | OUTPATIENT
Start: 2024-03-20 | End: 2024-04-19

## 2024-04-19 DIAGNOSIS — D57.00 SICKLE CELL PAIN CRISIS (HCC): ICD-10-CM

## 2024-04-19 RX ORDER — HYDROCODONE BITARTRATE AND ACETAMINOPHEN 10; 325 MG/1; MG/1
1 TABLET ORAL EVERY 6 HOURS PRN
Qty: 120 TABLET | Refills: 0 | Status: SHIPPED | OUTPATIENT
Start: 2024-04-19 | End: 2024-05-19

## 2024-05-14 DIAGNOSIS — D57.00 SICKLE CELL PAIN CRISIS (HCC): Primary | ICD-10-CM

## 2024-05-15 RX ORDER — FENTANYL 75 UG/1
1 PATCH TRANSDERMAL
Qty: 10 PATCH | Refills: 0 | Status: SHIPPED | OUTPATIENT
Start: 2024-05-15 | End: 2024-06-14

## 2024-05-22 DIAGNOSIS — D58.2 HEMOGLOBINOPATHY (HCC): Primary | ICD-10-CM

## 2024-05-23 RX ORDER — HYDROCODONE BITARTRATE AND ACETAMINOPHEN 10; 325 MG/1; MG/1
1 TABLET ORAL EVERY 6 HOURS PRN
Qty: 120 TABLET | Refills: 0 | Status: SHIPPED | OUTPATIENT
Start: 2024-05-23 | End: 2024-06-22

## 2024-06-06 RX ORDER — FOLIC ACID 1 MG/1
1000 TABLET ORAL DAILY
Qty: 30 TABLET | Refills: 11 | Status: SHIPPED | OUTPATIENT
Start: 2024-06-06

## 2024-07-30 DIAGNOSIS — D57.00 SICKLE CELL PAIN CRISIS (HCC): Primary | ICD-10-CM

## 2024-07-31 RX ORDER — FENTANYL 75 UG/1
1 PATCH TRANSDERMAL
Qty: 10 PATCH | Refills: 0 | Status: SHIPPED | OUTPATIENT
Start: 2024-07-31 | End: 2024-08-30

## 2024-08-07 ENCOUNTER — TELEPHONE (OUTPATIENT)
Facility: CLINIC | Age: 64
End: 2024-08-07

## 2024-08-14 DIAGNOSIS — D57.00 SICKLE CELL PAIN CRISIS (HCC): Primary | ICD-10-CM

## 2024-08-14 RX ORDER — HYDROCODONE BITARTRATE AND ACETAMINOPHEN 10; 325 MG/1; MG/1
1 TABLET ORAL EVERY 6 HOURS PRN
Qty: 120 TABLET | Refills: 0 | Status: SHIPPED | OUTPATIENT
Start: 2024-08-14 | End: 2024-09-13

## 2024-08-14 RX ORDER — HYDROCODONE BITARTRATE AND ACETAMINOPHEN 10; 325 MG/1; MG/1
1 TABLET ORAL EVERY 6 HOURS PRN
Qty: 120 TABLET | Refills: 0 | OUTPATIENT
Start: 2024-08-14 | End: 2024-09-13

## 2024-08-29 RX ORDER — TELMISARTAN 40 MG/1
40 TABLET ORAL NIGHTLY
Qty: 30 TABLET | Refills: 11 | Status: SHIPPED | OUTPATIENT
Start: 2024-08-29

## 2024-09-06 ENCOUNTER — OFFICE VISIT (OUTPATIENT)
Facility: CLINIC | Age: 64
End: 2024-09-06
Payer: MEDICARE

## 2024-09-06 VITALS
SYSTOLIC BLOOD PRESSURE: 152 MMHG | HEIGHT: 70 IN | DIASTOLIC BLOOD PRESSURE: 87 MMHG | WEIGHT: 185.4 LBS | BODY MASS INDEX: 26.54 KG/M2 | TEMPERATURE: 98.6 F | RESPIRATION RATE: 16 BRPM | HEART RATE: 74 BPM | OXYGEN SATURATION: 100 %

## 2024-09-06 DIAGNOSIS — F41.9 ANXIETY AND DEPRESSION: ICD-10-CM

## 2024-09-06 DIAGNOSIS — F32.A ANXIETY AND DEPRESSION: ICD-10-CM

## 2024-09-06 DIAGNOSIS — D57.00 SICKLE CELL PAIN CRISIS (HCC): ICD-10-CM

## 2024-09-06 DIAGNOSIS — I10 PRIMARY HYPERTENSION: Primary | ICD-10-CM

## 2024-09-06 DIAGNOSIS — E78.5 DYSLIPIDEMIA: ICD-10-CM

## 2024-09-06 DIAGNOSIS — I87.2 VENOUS INSUFFICIENCY: ICD-10-CM

## 2024-09-06 PROCEDURE — 1036F TOBACCO NON-USER: CPT | Performed by: INTERNAL MEDICINE

## 2024-09-06 PROCEDURE — G8419 CALC BMI OUT NRM PARAM NOF/U: HCPCS | Performed by: INTERNAL MEDICINE

## 2024-09-06 PROCEDURE — 36415 COLL VENOUS BLD VENIPUNCTURE: CPT | Performed by: INTERNAL MEDICINE

## 2024-09-06 PROCEDURE — 3079F DIAST BP 80-89 MM HG: CPT | Performed by: INTERNAL MEDICINE

## 2024-09-06 PROCEDURE — 3017F COLORECTAL CA SCREEN DOC REV: CPT | Performed by: INTERNAL MEDICINE

## 2024-09-06 PROCEDURE — 3077F SYST BP >= 140 MM HG: CPT | Performed by: INTERNAL MEDICINE

## 2024-09-06 PROCEDURE — 99214 OFFICE O/P EST MOD 30 MIN: CPT | Performed by: INTERNAL MEDICINE

## 2024-09-06 PROCEDURE — G8427 DOCREV CUR MEDS BY ELIG CLIN: HCPCS | Performed by: INTERNAL MEDICINE

## 2024-09-06 SDOH — ECONOMIC STABILITY: INCOME INSECURITY: IN THE LAST 12 MONTHS, WAS THERE A TIME WHEN YOU WERE NOT ABLE TO PAY THE MORTGAGE OR RENT ON TIME?: NO

## 2024-09-07 LAB
ALBUMIN SERPL-MCNC: 3.8 G/DL (ref 3.5–5)
ALBUMIN/GLOB SERPL: 0.8 (ref 1.1–2.2)
ALP SERPL-CCNC: 83 U/L (ref 45–117)
ALT SERPL-CCNC: 17 U/L (ref 12–78)
ANION GAP SERPL CALC-SCNC: 3 MMOL/L (ref 2–12)
APPEARANCE UR: CLEAR
AST SERPL-CCNC: 38 U/L (ref 15–37)
BACTERIA URNS QL MICRO: NEGATIVE /HPF
BASOPHILS # BLD: 0 K/UL (ref 0–0.1)
BASOPHILS NFR BLD: 1 % (ref 0–1)
BILIRUB SERPL-MCNC: 1 MG/DL (ref 0.2–1)
BILIRUB UR QL: NEGATIVE
BUN SERPL-MCNC: 11 MG/DL (ref 6–20)
BUN/CREAT SERPL: 15 (ref 12–20)
CALCIUM SERPL-MCNC: 8.9 MG/DL (ref 8.5–10.1)
CHLORIDE SERPL-SCNC: 107 MMOL/L (ref 97–108)
CHOLEST SERPL-MCNC: 128 MG/DL
CO2 SERPL-SCNC: 28 MMOL/L (ref 21–32)
COLOR UR: ABNORMAL
CREAT SERPL-MCNC: 0.72 MG/DL (ref 0.55–1.02)
DIFFERENTIAL METHOD BLD: ABNORMAL
EOSINOPHIL # BLD: 0 K/UL (ref 0–0.4)
EOSINOPHIL NFR BLD: 1 % (ref 0–7)
EPITH CASTS URNS QL MICRO: ABNORMAL /LPF
ERYTHROCYTE [DISTWIDTH] IN BLOOD BY AUTOMATED COUNT: 13.7 % (ref 11.5–14.5)
GLOBULIN SER CALC-MCNC: 4.9 G/DL (ref 2–4)
GLUCOSE SERPL-MCNC: 106 MG/DL (ref 65–100)
GLUCOSE UR STRIP.AUTO-MCNC: NEGATIVE MG/DL
HCT VFR BLD AUTO: 26.7 % (ref 35–47)
HDLC SERPL-MCNC: 50 MG/DL
HDLC SERPL: 2.6 (ref 0–5)
HGB BLD-MCNC: 9 G/DL (ref 11.5–16)
HGB UR QL STRIP: NEGATIVE
HYALINE CASTS URNS QL MICRO: ABNORMAL /LPF (ref 0–5)
IMM GRANULOCYTES # BLD AUTO: 0 K/UL (ref 0–0.04)
IMM GRANULOCYTES NFR BLD AUTO: 0 % (ref 0–0.5)
KETONES UR QL STRIP.AUTO: ABNORMAL MG/DL
LDLC SERPL CALC-MCNC: 60.6 MG/DL (ref 0–100)
LEUKOCYTE ESTERASE UR QL STRIP.AUTO: ABNORMAL
LYMPHOCYTES # BLD: 1.6 K/UL (ref 0.8–3.5)
LYMPHOCYTES NFR BLD: 43 % (ref 12–49)
MCH RBC QN AUTO: 38.1 PG (ref 26–34)
MCHC RBC AUTO-ENTMCNC: 33.7 G/DL (ref 30–36.5)
MCV RBC AUTO: 113.1 FL (ref 80–99)
MONOCYTES # BLD: 0.5 K/UL (ref 0–1)
MONOCYTES NFR BLD: 14 % (ref 5–13)
NEUTS SEG # BLD: 1.7 K/UL (ref 1.8–8)
NEUTS SEG NFR BLD: 41 % (ref 32–75)
NITRITE UR QL STRIP.AUTO: NEGATIVE
NRBC # BLD: 0.05 K/UL (ref 0–0.01)
NRBC BLD-RTO: 1.3 PER 100 WBC
PH UR STRIP: 6.5 (ref 5–8)
PLATELET # BLD AUTO: 194 K/UL (ref 150–400)
PMV BLD AUTO: 12.2 FL (ref 8.9–12.9)
POTASSIUM SERPL-SCNC: 4.1 MMOL/L (ref 3.5–5.1)
PROT SERPL-MCNC: 8.7 G/DL (ref 6.4–8.2)
PROT UR STRIP-MCNC: NEGATIVE MG/DL
RBC # BLD AUTO: 2.36 M/UL (ref 3.8–5.2)
RBC #/AREA URNS HPF: ABNORMAL /HPF (ref 0–5)
RBC MORPH BLD: ABNORMAL
SODIUM SERPL-SCNC: 138 MMOL/L (ref 136–145)
SP GR UR REFRACTOMETRY: 1.01 (ref 1–1.03)
TRIGL SERPL-MCNC: 87 MG/DL
UROBILINOGEN UR QL STRIP.AUTO: 1 EU/DL (ref 0.2–1)
VLDLC SERPL CALC-MCNC: 17.4 MG/DL
WBC # BLD AUTO: 3.8 K/UL (ref 3.6–11)
WBC URNS QL MICRO: ABNORMAL /HPF (ref 0–4)

## 2024-09-23 DIAGNOSIS — D57.00 SICKLE CELL PAIN CRISIS (HCC): Primary | ICD-10-CM

## 2024-09-24 RX ORDER — FENTANYL 75 UG/1
1 PATCH TRANSDERMAL
Qty: 10 PATCH | Refills: 0 | Status: SHIPPED | OUTPATIENT
Start: 2024-09-24 | End: 2024-10-24

## 2024-09-24 RX ORDER — HYDROCODONE BITARTRATE AND ACETAMINOPHEN 10; 325 MG/1; MG/1
1 TABLET ORAL EVERY 6 HOURS PRN
Qty: 120 TABLET | Refills: 0 | Status: SHIPPED | OUTPATIENT
Start: 2024-09-24 | End: 2024-10-24

## 2024-11-06 DIAGNOSIS — D57.00 SICKLE CELL PAIN CRISIS (HCC): Primary | ICD-10-CM

## 2024-11-07 RX ORDER — HYDROCODONE BITARTRATE AND ACETAMINOPHEN 10; 325 MG/1; MG/1
1 TABLET ORAL EVERY 6 HOURS PRN
Qty: 120 TABLET | Refills: 0 | Status: SHIPPED | OUTPATIENT
Start: 2024-11-07 | End: 2024-12-07

## 2024-12-05 ENCOUNTER — OFFICE VISIT (OUTPATIENT)
Facility: CLINIC | Age: 64
End: 2024-12-05

## 2024-12-05 VITALS
WEIGHT: 184 LBS | HEIGHT: 70 IN | TEMPERATURE: 98.7 F | SYSTOLIC BLOOD PRESSURE: 160 MMHG | OXYGEN SATURATION: 100 % | DIASTOLIC BLOOD PRESSURE: 100 MMHG | BODY MASS INDEX: 26.34 KG/M2 | HEART RATE: 87 BPM | RESPIRATION RATE: 16 BRPM

## 2024-12-05 DIAGNOSIS — F41.9 ANXIETY AND DEPRESSION: ICD-10-CM

## 2024-12-05 DIAGNOSIS — M79.18 MUSCULOSKELETAL PAIN: ICD-10-CM

## 2024-12-05 DIAGNOSIS — I10 PRIMARY HYPERTENSION: ICD-10-CM

## 2024-12-05 DIAGNOSIS — K22.2 ESOPHAGEAL STRICTURE: ICD-10-CM

## 2024-12-05 DIAGNOSIS — M65.939: Primary | ICD-10-CM

## 2024-12-05 DIAGNOSIS — D57.00 SICKLE CELL DISEASE WITH CRISIS (HCC): ICD-10-CM

## 2024-12-05 DIAGNOSIS — F32.A ANXIETY AND DEPRESSION: ICD-10-CM
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
## 2024-12-09 DIAGNOSIS — D57.00 SICKLE CELL PAIN CRISIS (HCC): Primary | ICD-10-CM

## 2024-12-10 ENCOUNTER — HOSPITAL ENCOUNTER (EMERGENCY)
Facility: HOSPITAL | Age: 64
Discharge: HOME | End: 2024-12-10
Payer: MEDICARE

## 2024-12-10 VITALS
DIASTOLIC BLOOD PRESSURE: 86 MMHG | BODY MASS INDEX: 27.17 KG/M2 | RESPIRATION RATE: 18 BRPM | SYSTOLIC BLOOD PRESSURE: 153 MMHG | OXYGEN SATURATION: 100 % | WEIGHT: 189.38 LBS | HEART RATE: 87 BPM | TEMPERATURE: 98.8 F
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
## 2024-12-10 DIAGNOSIS — S39.012A STRAIN OF LUMBAR REGION, INITIAL ENCOUNTER: ICD-10-CM

## 2024-12-10 DIAGNOSIS — S16.1XXA ACUTE STRAIN OF NECK MUSCLE, INITIAL ENCOUNTER: ICD-10-CM

## 2024-12-10 PROCEDURE — 99283 EMERGENCY DEPT VISIT LOW MDM: CPT

## 2024-12-10 RX ORDER — METHOCARBAMOL 750 MG/1
750 TABLET, FILM COATED ORAL 4 TIMES DAILY
Qty: 40 TABLET | Refills: 0 | Status: SHIPPED | OUTPATIENT
Start: 2024-12-10 | End: 2024-12-20

## 2024-12-10 RX ORDER — LIDOCAINE 50 MG/G
1 PATCH TOPICAL DAILY
Qty: 10 PATCH | Refills: 0 | Status: SHIPPED | OUTPATIENT
Start: 2024-12-10 | End: 2024-12-20

## 2024-12-10 ASSESSMENT — PAIN SCALES - GENERAL: PAINLEVEL_OUTOF10: 6
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
## 2024-12-11 RX ORDER — HYDROCODONE BITARTRATE AND ACETAMINOPHEN 10; 325 MG/1; MG/1
1 TABLET ORAL EVERY 6 HOURS PRN
Qty: 120 TABLET | Refills: 0 | Status: SHIPPED | OUTPATIENT
Start: 2024-12-11 | End: 2025-01-10

## 2024-12-11 RX ORDER — FENTANYL 75 UG/1
1 PATCH TRANSDERMAL
Qty: 10 PATCH | Refills: 0 | Status: SHIPPED | OUTPATIENT
Start: 2024-12-11 | End: 2025-01-10

## 2024-12-18 RX ORDER — HYDROXYUREA 500 MG/1
500 CAPSULE ORAL 2 TIMES DAILY
Qty: 60 CAPSULE | Refills: 11 | Status: SHIPPED | OUTPATIENT
Start: 2024-12-18

## 2024-12-19 ENCOUNTER — HOSPITAL ENCOUNTER (EMERGENCY)
Facility: HOSPITAL | Age: 64
Discharge: HOME | End: 2024-12-19
Attending: EMERGENCY MEDICINE
Payer: MEDICARE

## 2024-12-19 VITALS
WEIGHT: 184 LBS | OXYGEN SATURATION: 100 % | HEART RATE: 90 BPM | TEMPERATURE: 98.4 F | RESPIRATION RATE: 18 BRPM | HEIGHT: 70 IN | DIASTOLIC BLOOD PRESSURE: 93 MMHG | BODY MASS INDEX: 26.34 KG/M2 | SYSTOLIC BLOOD PRESSURE: 177 MMHG

## 2024-12-19 DIAGNOSIS — I10 HYPERTENSION, UNSPECIFIED TYPE: Primary | ICD-10-CM

## 2024-12-19 LAB
ALBUMIN SERPL-MCNC: 4 G/DL (ref 3.5–5)
ALBUMIN/GLOB SERPL: 0.8 (ref 1.1–2.2)
ALP SERPL-CCNC: 74 U/L (ref 45–117)
ALT SERPL-CCNC: 17 U/L (ref 12–78)
ANION GAP SERPL CALC-SCNC: 10 MMOL/L (ref 2–12)
AST SERPL-CCNC: 26 U/L (ref 15–37)
BASOPHILS # BLD: 0 K/UL (ref 0–0.1)
BASOPHILS NFR BLD: 0 % (ref 0–1)
BILIRUB SERPL-MCNC: 1.1 MG/DL (ref 0.2–1)
BUN SERPL-MCNC: 9 MG/DL (ref 6–20)
BUN/CREAT SERPL: 10 (ref 12–20)
CALCIUM SERPL-MCNC: 8.8 MG/DL (ref 8.5–10.1)
CHLORIDE SERPL-SCNC: 104 MMOL/L (ref 97–108)
CO2 SERPL-SCNC: 27 MMOL/L (ref 21–32)
CREAT SERPL-MCNC: 0.9 MG/DL (ref 0.55–1.02)
DIFFERENTIAL METHOD BLD: ABNORMAL
EOSINOPHIL # BLD: 0 K/UL (ref 0–0.4)
EOSINOPHIL NFR BLD: 0 % (ref 0–7)
ERYTHROCYTE [DISTWIDTH] IN BLOOD BY AUTOMATED COUNT: 13.6 % (ref 11.5–14.5)
GLOBULIN SER CALC-MCNC: 4.9 G/DL (ref 2–4)
GLUCOSE SERPL-MCNC: 79 MG/DL (ref 65–100)
HCT VFR BLD AUTO: 24.8 % (ref 35–47)
HGB BLD-MCNC: 9 G/DL (ref 11.5–16)
IMM GRANULOCYTES # BLD AUTO: 0 K/UL
IMM GRANULOCYTES NFR BLD AUTO: 0 %
LYMPHOCYTES # BLD: 3.4 K/UL (ref 0.8–3.5)
LYMPHOCYTES NFR BLD: 57 % (ref 12–49)
MCH RBC QN AUTO: 39.3 PG (ref 26–34)
MCHC RBC AUTO-ENTMCNC: 36.3 G/DL (ref 30–36.5)
MCV RBC AUTO: 108.3 FL (ref 80–99)
MONOCYTES # BLD: 0.7 K/UL (ref 0–1)
MONOCYTES NFR BLD: 11 % (ref 5–13)
NEUTS SEG # BLD: 2 K/UL (ref 1.8–8)
NEUTS SEG NFR BLD: 32 % (ref 32–75)
NRBC # BLD: 0.31 K/UL (ref 0–0.01)
NRBC BLD-RTO: 5.1 PER 100 WBC
PLATELET # BLD AUTO: 192 K/UL (ref 150–400)
PMV BLD AUTO: 11.2 FL (ref 8.9–12.9)
POTASSIUM SERPL-SCNC: 3.4 MMOL/L (ref 3.5–5.1)
PROT SERPL-MCNC: 8.9 G/DL (ref 6.4–8.2)
RBC # BLD AUTO: 2.29 M/UL (ref 3.8–5.2)
RBC MORPH BLD: ABNORMAL
SODIUM SERPL-SCNC: 141 MMOL/L (ref 136–145)
TROPONIN I SERPL HS-MCNC: 5 NG/L (ref 0–51)
WBC # BLD AUTO: 6.1 K/UL (ref 3.6–11)

## 2024-12-19 PROCEDURE — 93005 ELECTROCARDIOGRAM TRACING: CPT | Performed by: EMERGENCY MEDICINE

## 2024-12-19 PROCEDURE — 85025 COMPLETE CBC W/AUTO DIFF WBC: CPT

## 2024-12-19 PROCEDURE — 36415 COLL VENOUS BLD VENIPUNCTURE: CPT

## 2024-12-19 PROCEDURE — 80053 COMPREHEN METABOLIC PANEL: CPT

## 2024-12-19 PROCEDURE — 99284 EMERGENCY DEPT VISIT MOD MDM: CPT

## 2024-12-19 PROCEDURE — 84484 ASSAY OF TROPONIN QUANT: CPT

## 2024-12-19 ASSESSMENT — PAIN DESCRIPTION - ONSET: ONSET: GRADUAL

## 2024-12-19 ASSESSMENT — PAIN DESCRIPTION - FREQUENCY: FREQUENCY: CONTINUOUS

## 2024-12-19 ASSESSMENT — PAIN DESCRIPTION - LOCATION: LOCATION: HEAD

## 2024-12-19 ASSESSMENT — PAIN SCALES - GENERAL: PAINLEVEL_OUTOF10: 2

## 2024-12-19 ASSESSMENT — PAIN DESCRIPTION - PAIN TYPE: TYPE: ACUTE PAIN

## 2024-12-19 ASSESSMENT — PAIN DESCRIPTION - ORIENTATION: ORIENTATION: ANTERIOR

## 2024-12-19 ASSESSMENT — PAIN - FUNCTIONAL ASSESSMENT: PAIN_FUNCTIONAL_ASSESSMENT: ACTIVITIES ARE NOT PREVENTED

## 2024-12-19 ASSESSMENT — PAIN DESCRIPTION - DESCRIPTORS: DESCRIPTORS: ACHING
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
## 2024-12-19 NOTE — ED PROVIDER NOTES
Pulse: 90   Resp: 18   Temp: 98.4 °F (36.9 °C)   TempSrc: Oral   SpO2: 100%   Weight: 83.5 kg (184 lb)   Height: 1.778 m (5' 10\")           Medical Decision Making  Amount and/or Complexity of Data Reviewed  Labs: ordered.  ECG/medicine tests: ordered.            REASSESSMENT      Progress Note:  Results, treatment, and follow up plan have been discussed with patient.  Questions were answered.   Mike Valdez MD  7:08 PM        CONSULTS:  None    PROCEDURES:  Unless otherwise noted below, none     Procedures      FINAL IMPRESSION      Assessment/plan: 64-year-old with a history of sickle cell disease, hypertension, depression, hepatitis C.  She presents over concerns of elevated blood pressures.  She reports compliance with her blood pressure medicine.  Differential diagnosis includes end-organ damage (brain, heart, kidney, liver, and others).  Reassuring appearance/exam with stable vital signs.  CMP remarkable for a potassium of 3.4.  EKG, troponin okay.  Home with PCP follow-up.  Return precautions.  Mike Valdez MD  7:09 PM      DISPOSITION/PLAN   DISPOSITION        PATIENT REFERRED TO:  No follow-up provider specified.    DISCHARGE MEDICATIONS:  New Prescriptions    No medications on file         (Please note that portions of this note were completed with a voice recognition program.  Efforts were made to edit the dictations but occasionally words are mis-transcribed.)    Mike Valdez MD (electronically signed)  Emergency Attending Physician / Physician Assistant / Nurse Practitioner             Mike Valdez MD  12/19/24 8586

## 2024-12-20 LAB
EKG ATRIAL RATE: 93 BPM
EKG DIAGNOSIS: NORMAL
EKG P AXIS: 72 DEGREES
EKG P-R INTERVAL: 146 MS
EKG Q-T INTERVAL: 378 MS
EKG QRS DURATION: 86 MS
EKG QTC CALCULATION (BAZETT): 469 MS
EKG R AXIS: 53 DEGREES
EKG T AXIS: 42 DEGREES
EKG VENTRICULAR RATE: 93 BPM

## 2024-12-20 RX ORDER — AMLODIPINE BESYLATE 5 MG/1
5 TABLET ORAL DAILY
Qty: 30 TABLET | Refills: 11 | Status: SHIPPED | OUTPATIENT
Start: 2024-12-20

## 2024-12-28 ENCOUNTER — HOSPITAL ENCOUNTER (INPATIENT)
Facility: HOSPITAL | Age: 64
LOS: 6 days | Discharge: HOME | End: 2025-01-04
Attending: EMERGENCY MEDICINE | Admitting: STUDENT IN AN ORGANIZED HEALTH CARE EDUCATION/TRAINING PROGRAM
Payer: MEDICARE

## 2024-12-28 DIAGNOSIS — R52 INTRACTABLE PAIN: ICD-10-CM

## 2024-12-28 DIAGNOSIS — D57.00 SICKLE CELL CRISIS (HCC): Primary | ICD-10-CM

## 2024-12-28 LAB
ALBUMIN SERPL-MCNC: 4.2 G/DL (ref 3.5–5)
ALBUMIN/GLOB SERPL: 0.8 (ref 1.1–2.2)
ALP SERPL-CCNC: 76 U/L (ref 45–117)
ALT SERPL-CCNC: 19 U/L (ref 12–78)
ANION GAP SERPL CALC-SCNC: 4 MMOL/L (ref 2–12)
AST SERPL-CCNC: 26 U/L (ref 15–37)
BASOPHILS # BLD: 0 K/UL (ref 0–0.1)
BASOPHILS NFR BLD: 0 % (ref 0–1)
BILIRUB SERPL-MCNC: 1.5 MG/DL (ref 0.2–1)
BUN SERPL-MCNC: 13 MG/DL (ref 6–20)
BUN/CREAT SERPL: 12 (ref 12–20)
CALCIUM SERPL-MCNC: 9.5 MG/DL (ref 8.5–10.1)
CHLORIDE SERPL-SCNC: 106 MMOL/L (ref 97–108)
CO2 SERPL-SCNC: 28 MMOL/L (ref 21–32)
CREAT SERPL-MCNC: 1.13 MG/DL (ref 0.55–1.02)
DIFFERENTIAL METHOD BLD: ABNORMAL
EOSINOPHIL # BLD: 0.1 K/UL (ref 0–0.4)
EOSINOPHIL NFR BLD: 1 % (ref 0–7)
ERYTHROCYTE [DISTWIDTH] IN BLOOD BY AUTOMATED COUNT: 14.4 % (ref 11.5–14.5)
GLOBULIN SER CALC-MCNC: 5.2 G/DL (ref 2–4)
GLUCOSE SERPL-MCNC: 133 MG/DL (ref 65–100)
HCT VFR BLD AUTO: 27.7 % (ref 35–47)
HGB BLD-MCNC: 10.2 G/DL (ref 11.5–16)
IMM GRANULOCYTES # BLD AUTO: 0.1 K/UL (ref 0–0.04)
IMM GRANULOCYTES NFR BLD AUTO: 1 % (ref 0–0.5)
LYMPHOCYTES # BLD: 3.5 K/UL (ref 0.8–3.5)
LYMPHOCYTES NFR BLD: 40 % (ref 12–49)
MCH RBC QN AUTO: 39.8 PG (ref 26–34)
MCHC RBC AUTO-ENTMCNC: 36.8 G/DL (ref 30–36.5)
MCV RBC AUTO: 108.2 FL (ref 80–99)
MONOCYTES # BLD: 0.9 K/UL (ref 0–1)
MONOCYTES NFR BLD: 10 % (ref 5–13)
NEUTS SEG # BLD: 4.2 K/UL (ref 1.8–8)
NEUTS SEG NFR BLD: 48 % (ref 32–75)
NRBC # BLD: 1.83 K/UL (ref 0–0.01)
NRBC BLD-RTO: 20.7 PER 100 WBC
PLATELET # BLD AUTO: 224 K/UL (ref 150–400)
PLATELET COMMENT: ABNORMAL
PMV BLD AUTO: 11.2 FL (ref 8.9–12.9)
POTASSIUM SERPL-SCNC: 3.1 MMOL/L (ref 3.5–5.1)
PROT SERPL-MCNC: 9.4 G/DL (ref 6.4–8.2)
RBC # BLD AUTO: 2.56 M/UL (ref 3.8–5.2)
RBC MORPH BLD: ABNORMAL
RETICS # AUTO: 0.1 M/UL (ref 0.02–0.08)
RETICS/RBC NFR AUTO: 4 % (ref 0.7–2.1)
SODIUM SERPL-SCNC: 138 MMOL/L (ref 136–145)
WBC # BLD AUTO: 8.8 K/UL (ref 3.6–11)

## 2024-12-28 PROCEDURE — 85045 AUTOMATED RETICULOCYTE COUNT: CPT

## 2024-12-28 PROCEDURE — 36415 COLL VENOUS BLD VENIPUNCTURE: CPT

## 2024-12-28 PROCEDURE — 80053 COMPREHEN METABOLIC PANEL: CPT

## 2024-12-28 PROCEDURE — 85025 COMPLETE CBC W/AUTO DIFF WBC: CPT

## 2024-12-28 PROCEDURE — 99285 EMERGENCY DEPT VISIT HI MDM: CPT

## 2024-12-28 RX ORDER — KETOROLAC TROMETHAMINE 30 MG/ML
15 INJECTION, SOLUTION INTRAMUSCULAR; INTRAVENOUS ONCE
Status: COMPLETED | OUTPATIENT
Start: 2024-12-28 | End: 2024-12-29

## 2024-12-28 RX ORDER — ONDANSETRON 2 MG/ML
4 INJECTION INTRAMUSCULAR; INTRAVENOUS ONCE
Status: COMPLETED | OUTPATIENT
Start: 2024-12-28 | End: 2024-12-29

## 2024-12-28 RX ORDER — POTASSIUM CHLORIDE 1500 MG/1
40 TABLET, EXTENDED RELEASE ORAL ONCE
Status: COMPLETED | OUTPATIENT
Start: 2024-12-28 | End: 2024-12-29

## 2024-12-28 RX ORDER — MORPHINE SULFATE 10 MG/ML
5 INJECTION, SOLUTION INTRAMUSCULAR; INTRAVENOUS
Status: COMPLETED | OUTPATIENT
Start: 2024-12-28 | End: 2024-12-29

## 2024-12-28 RX ORDER — DIPHENHYDRAMINE HYDROCHLORIDE 50 MG/ML
25 INJECTION INTRAMUSCULAR; INTRAVENOUS
Status: COMPLETED | OUTPATIENT
Start: 2024-12-28 | End: 2024-12-29

## 2024-12-28 RX ORDER — 0.9 % SODIUM CHLORIDE 0.9 %
500 INTRAVENOUS SOLUTION INTRAVENOUS ONCE
Status: COMPLETED | OUTPATIENT
Start: 2024-12-28 | End: 2024-12-29

## 2024-12-28 ASSESSMENT — PAIN SCALES - GENERAL: PAINLEVEL_OUTOF10: 10

## 2024-12-28 ASSESSMENT — PAIN DESCRIPTION - LOCATION: LOCATION: GENERALIZED

## 2024-12-29 ENCOUNTER — APPOINTMENT (OUTPATIENT)
Facility: HOSPITAL | Age: 64
End: 2024-12-29
Payer: MEDICARE

## 2024-12-29 PROBLEM — D57.00 SICKLE CELL CRISIS (HCC): Status: ACTIVE | Noted: 2024-12-29

## 2024-12-29 PROCEDURE — 6370000000 HC RX 637 (ALT 250 FOR IP): Performed by: STUDENT IN AN ORGANIZED HEALTH CARE EDUCATION/TRAINING PROGRAM

## 2024-12-29 PROCEDURE — 6370000000 HC RX 637 (ALT 250 FOR IP): Performed by: EMERGENCY MEDICINE

## 2024-12-29 PROCEDURE — 96375 TX/PRO/DX INJ NEW DRUG ADDON: CPT

## 2024-12-29 PROCEDURE — 6360000002 HC RX W HCPCS: Performed by: STUDENT IN AN ORGANIZED HEALTH CARE EDUCATION/TRAINING PROGRAM

## 2024-12-29 PROCEDURE — 2580000003 HC RX 258: Performed by: HOSPITALIST

## 2024-12-29 PROCEDURE — 96376 TX/PRO/DX INJ SAME DRUG ADON: CPT

## 2024-12-29 PROCEDURE — 2500000003 HC RX 250 WO HCPCS: Performed by: STUDENT IN AN ORGANIZED HEALTH CARE EDUCATION/TRAINING PROGRAM

## 2024-12-29 PROCEDURE — 2580000003 HC RX 258: Performed by: STUDENT IN AN ORGANIZED HEALTH CARE EDUCATION/TRAINING PROGRAM

## 2024-12-29 PROCEDURE — 96374 THER/PROPH/DIAG INJ IV PUSH: CPT

## 2024-12-29 PROCEDURE — 6360000002 HC RX W HCPCS: Performed by: EMERGENCY MEDICINE

## 2024-12-29 PROCEDURE — 1100000003 HC PRIVATE W/ TELEMETRY

## 2024-12-29 PROCEDURE — 2580000003 HC RX 258: Performed by: EMERGENCY MEDICINE

## 2024-12-29 PROCEDURE — 99222 1ST HOSP IP/OBS MODERATE 55: CPT | Performed by: INTERNAL MEDICINE

## 2024-12-29 PROCEDURE — 93005 ELECTROCARDIOGRAM TRACING: CPT | Performed by: STUDENT IN AN ORGANIZED HEALTH CARE EDUCATION/TRAINING PROGRAM

## 2024-12-29 PROCEDURE — 71045 X-RAY EXAM CHEST 1 VIEW: CPT

## 2024-12-29 RX ORDER — POLYETHYLENE GLYCOL 3350 17 G/17G
17 POWDER, FOR SOLUTION ORAL DAILY PRN
Status: DISCONTINUED | OUTPATIENT
Start: 2024-12-29 | End: 2025-01-04 | Stop reason: HOSPADM

## 2024-12-29 RX ORDER — SODIUM CHLORIDE 0.9 % (FLUSH) 0.9 %
5-40 SYRINGE (ML) INJECTION EVERY 12 HOURS SCHEDULED
Status: DISCONTINUED | OUTPATIENT
Start: 2024-12-29 | End: 2025-01-04 | Stop reason: HOSPADM

## 2024-12-29 RX ORDER — BENZOCAINE/MENTHOL 6 MG-10 MG
LOZENGE MUCOUS MEMBRANE 2 TIMES DAILY
Status: DISCONTINUED | OUTPATIENT
Start: 2024-12-29 | End: 2025-01-04 | Stop reason: HOSPADM

## 2024-12-29 RX ORDER — ENOXAPARIN SODIUM 100 MG/ML
40 INJECTION SUBCUTANEOUS DAILY
Status: DISCONTINUED | OUTPATIENT
Start: 2024-12-29 | End: 2025-01-04 | Stop reason: HOSPADM

## 2024-12-29 RX ORDER — ACETAMINOPHEN 650 MG/1
650 SUPPOSITORY RECTAL EVERY 6 HOURS PRN
Status: DISCONTINUED | OUTPATIENT
Start: 2024-12-29 | End: 2025-01-04 | Stop reason: HOSPADM

## 2024-12-29 RX ORDER — FENTANYL 75 UG/1
1 PATCH TRANSDERMAL
Status: DISCONTINUED | OUTPATIENT
Start: 2024-12-29 | End: 2025-01-04 | Stop reason: HOSPADM

## 2024-12-29 RX ORDER — FENTANYL CITRATE 50 UG/ML
100 INJECTION, SOLUTION INTRAMUSCULAR; INTRAVENOUS
Status: COMPLETED | OUTPATIENT
Start: 2024-12-29 | End: 2024-12-29

## 2024-12-29 RX ORDER — LOSARTAN POTASSIUM 100 MG/1
100 TABLET ORAL DAILY
Status: DISCONTINUED | OUTPATIENT
Start: 2024-12-29 | End: 2025-01-04 | Stop reason: HOSPADM

## 2024-12-29 RX ORDER — FLUOXETINE 10 MG/1
10 CAPSULE ORAL DAILY
Status: DISCONTINUED | OUTPATIENT
Start: 2024-12-29 | End: 2025-01-04 | Stop reason: HOSPADM

## 2024-12-29 RX ORDER — POTASSIUM CHLORIDE 1500 MG/1
40 TABLET, EXTENDED RELEASE ORAL PRN
Status: DISCONTINUED | OUTPATIENT
Start: 2024-12-29 | End: 2025-01-04 | Stop reason: HOSPADM

## 2024-12-29 RX ORDER — POTASSIUM CHLORIDE 7.45 MG/ML
10 INJECTION INTRAVENOUS PRN
Status: DISCONTINUED | OUTPATIENT
Start: 2024-12-29 | End: 2025-01-04 | Stop reason: HOSPADM

## 2024-12-29 RX ORDER — SODIUM CHLORIDE 9 MG/ML
INJECTION, SOLUTION INTRAVENOUS CONTINUOUS
Status: ACTIVE | OUTPATIENT
Start: 2024-12-29 | End: 2024-12-29

## 2024-12-29 RX ORDER — AMLODIPINE BESYLATE 5 MG/1
5 TABLET ORAL DAILY
Status: DISCONTINUED | OUTPATIENT
Start: 2024-12-29 | End: 2025-01-04 | Stop reason: HOSPADM

## 2024-12-29 RX ORDER — HYDROXYUREA 500 MG/1
500 CAPSULE ORAL 2 TIMES DAILY
Status: DISCONTINUED | OUTPATIENT
Start: 2024-12-29 | End: 2025-01-04 | Stop reason: HOSPADM

## 2024-12-29 RX ORDER — MAGNESIUM SULFATE IN WATER 40 MG/ML
2000 INJECTION, SOLUTION INTRAVENOUS PRN
Status: DISCONTINUED | OUTPATIENT
Start: 2024-12-29 | End: 2025-01-04 | Stop reason: HOSPADM

## 2024-12-29 RX ORDER — MULTIVITAMIN WITH IRON
1000 TABLET ORAL DAILY
Status: DISCONTINUED | OUTPATIENT
Start: 2024-12-29 | End: 2025-01-04 | Stop reason: HOSPADM

## 2024-12-29 RX ORDER — ONDANSETRON 2 MG/ML
4 INJECTION INTRAMUSCULAR; INTRAVENOUS EVERY 6 HOURS PRN
Status: DISCONTINUED | OUTPATIENT
Start: 2024-12-29 | End: 2025-01-04 | Stop reason: HOSPADM

## 2024-12-29 RX ORDER — SODIUM CHLORIDE 9 MG/ML
INJECTION, SOLUTION INTRAVENOUS CONTINUOUS
Status: ACTIVE | OUTPATIENT
Start: 2024-12-30 | End: 2024-12-30

## 2024-12-29 RX ORDER — FOLIC ACID 1 MG/1
1000 TABLET ORAL DAILY
Status: DISCONTINUED | OUTPATIENT
Start: 2024-12-29 | End: 2025-01-04 | Stop reason: HOSPADM

## 2024-12-29 RX ORDER — HYDROMORPHONE HYDROCHLORIDE 1 MG/ML
0.5 INJECTION, SOLUTION INTRAMUSCULAR; INTRAVENOUS; SUBCUTANEOUS
Status: DISCONTINUED | OUTPATIENT
Start: 2024-12-29 | End: 2024-12-30

## 2024-12-29 RX ORDER — SODIUM CHLORIDE 9 MG/ML
INJECTION, SOLUTION INTRAVENOUS PRN
Status: DISCONTINUED | OUTPATIENT
Start: 2024-12-29 | End: 2025-01-04 | Stop reason: HOSPADM

## 2024-12-29 RX ORDER — ACETAMINOPHEN 325 MG/1
650 TABLET ORAL EVERY 6 HOURS PRN
Status: DISCONTINUED | OUTPATIENT
Start: 2024-12-29 | End: 2025-01-04 | Stop reason: HOSPADM

## 2024-12-29 RX ORDER — SODIUM CHLORIDE 0.9 % (FLUSH) 0.9 %
5-40 SYRINGE (ML) INJECTION PRN
Status: DISCONTINUED | OUTPATIENT
Start: 2024-12-29 | End: 2025-01-04 | Stop reason: HOSPADM

## 2024-12-29 RX ORDER — PROMETHAZINE HYDROCHLORIDE 25 MG/1
25 TABLET ORAL EVERY 8 HOURS PRN
Status: DISCONTINUED | OUTPATIENT
Start: 2024-12-29 | End: 2025-01-04 | Stop reason: HOSPADM

## 2024-12-29 RX ORDER — DIPHENHYDRAMINE HCL 25 MG
25 CAPSULE ORAL EVERY 6 HOURS PRN
Status: DISCONTINUED | OUTPATIENT
Start: 2024-12-29 | End: 2025-01-04 | Stop reason: HOSPADM

## 2024-12-29 RX ORDER — HYDROMORPHONE HYDROCHLORIDE 1 MG/ML
0.25 INJECTION, SOLUTION INTRAMUSCULAR; INTRAVENOUS; SUBCUTANEOUS
Status: DISCONTINUED | OUTPATIENT
Start: 2024-12-29 | End: 2024-12-30

## 2024-12-29 RX ADMIN — MORPHINE SULFATE 5 MG: 10 INJECTION INTRAVENOUS at 00:14

## 2024-12-29 RX ADMIN — CYANOCOBALAMIN TAB 500 MCG 1000 MCG: 500 TAB at 09:50

## 2024-12-29 RX ADMIN — ENOXAPARIN SODIUM 40 MG: 100 INJECTION SUBCUTANEOUS at 09:51

## 2024-12-29 RX ADMIN — DIPHENHYDRAMINE HYDROCHLORIDE 25 MG: 25 CAPSULE ORAL at 12:46

## 2024-12-29 RX ADMIN — HYDROXYUREA 500 MG: 500 CAPSULE ORAL at 21:43

## 2024-12-29 RX ADMIN — FLUOXETINE HYDROCHLORIDE 10 MG: 10 CAPSULE ORAL at 09:50

## 2024-12-29 RX ADMIN — FENTANYL CITRATE 100 MCG: 50 INJECTION, SOLUTION INTRAMUSCULAR; INTRAVENOUS at 02:25

## 2024-12-29 RX ADMIN — ONDANSETRON 4 MG: 2 INJECTION INTRAMUSCULAR; INTRAVENOUS at 00:06

## 2024-12-29 RX ADMIN — ONDANSETRON 4 MG: 2 INJECTION INTRAMUSCULAR; INTRAVENOUS at 06:50

## 2024-12-29 RX ADMIN — HYDROMORPHONE HYDROCHLORIDE 0.25 MG: 1 INJECTION, SOLUTION INTRAMUSCULAR; INTRAVENOUS; SUBCUTANEOUS at 09:11

## 2024-12-29 RX ADMIN — HYDROMORPHONE HYDROCHLORIDE 0.5 MG: 1 INJECTION, SOLUTION INTRAMUSCULAR; INTRAVENOUS; SUBCUTANEOUS at 12:16

## 2024-12-29 RX ADMIN — HYDROMORPHONE HYDROCHLORIDE 0.5 MG: 1 INJECTION, SOLUTION INTRAMUSCULAR; INTRAVENOUS; SUBCUTANEOUS at 18:14

## 2024-12-29 RX ADMIN — HYDROMORPHONE HYDROCHLORIDE 0.5 MG: 1 INJECTION, SOLUTION INTRAMUSCULAR; INTRAVENOUS; SUBCUTANEOUS at 15:12

## 2024-12-29 RX ADMIN — DIPHENHYDRAMINE HYDROCHLORIDE 25 MG: 50 INJECTION INTRAMUSCULAR; INTRAVENOUS at 00:08

## 2024-12-29 RX ADMIN — DIPHENHYDRAMINE HYDROCHLORIDE 25 MG: 25 CAPSULE ORAL at 06:50

## 2024-12-29 RX ADMIN — SODIUM CHLORIDE, PRESERVATIVE FREE 10 ML: 5 INJECTION INTRAVENOUS at 09:53

## 2024-12-29 RX ADMIN — HYDROMORPHONE HYDROCHLORIDE 0.5 MG: 1 INJECTION, SOLUTION INTRAMUSCULAR; INTRAVENOUS; SUBCUTANEOUS at 21:50

## 2024-12-29 RX ADMIN — LOSARTAN POTASSIUM 100 MG: 100 TABLET, FILM COATED ORAL at 09:51

## 2024-12-29 RX ADMIN — ONDANSETRON 4 MG: 2 INJECTION INTRAMUSCULAR; INTRAVENOUS at 18:38

## 2024-12-29 RX ADMIN — HYDROXYUREA 500 MG: 500 CAPSULE ORAL at 10:33

## 2024-12-29 RX ADMIN — SODIUM CHLORIDE: 9 INJECTION, SOLUTION INTRAVENOUS at 10:16

## 2024-12-29 RX ADMIN — FENTANYL CITRATE 100 MCG: 50 INJECTION INTRAMUSCULAR; INTRAVENOUS at 01:14

## 2024-12-29 RX ADMIN — SODIUM CHLORIDE: 9 INJECTION, SOLUTION INTRAVENOUS at 23:35

## 2024-12-29 RX ADMIN — DIPHENHYDRAMINE HYDROCHLORIDE 25 MG: 25 CAPSULE ORAL at 18:38

## 2024-12-29 RX ADMIN — POTASSIUM CHLORIDE 40 MEQ: 1500 TABLET, EXTENDED RELEASE ORAL at 01:14

## 2024-12-29 RX ADMIN — FOLIC ACID 1000 MCG: 1 TABLET ORAL at 09:50

## 2024-12-29 RX ADMIN — FENTANYL CITRATE 100 MCG: 50 INJECTION, SOLUTION INTRAMUSCULAR; INTRAVENOUS at 04:55

## 2024-12-29 RX ADMIN — KETOROLAC TROMETHAMINE 15 MG: 30 INJECTION, SOLUTION INTRAMUSCULAR at 00:11

## 2024-12-29 RX ADMIN — SODIUM CHLORIDE 500 ML: 9 INJECTION, SOLUTION INTRAVENOUS at 00:03

## 2024-12-29 RX ADMIN — AMLODIPINE BESYLATE 5 MG: 5 TABLET ORAL at 09:50

## 2024-12-29 RX ADMIN — SODIUM CHLORIDE, PRESERVATIVE FREE 10 ML: 5 INJECTION INTRAVENOUS at 23:36

## 2024-12-29 RX ADMIN — ONDANSETRON 4 MG: 2 INJECTION INTRAMUSCULAR; INTRAVENOUS at 12:46

## 2024-12-29 ASSESSMENT — PAIN DESCRIPTION - DESCRIPTORS
DESCRIPTORS: ACHING
DESCRIPTORS: THROBBING
DESCRIPTORS: THROBBING
DESCRIPTORS: ACHING
DESCRIPTORS: ACHING
DESCRIPTORS: THROBBING
DESCRIPTORS: ACHING
DESCRIPTORS: THROBBING
DESCRIPTORS: THROBBING

## 2024-12-29 ASSESSMENT — PAIN DESCRIPTION - PAIN TYPE
TYPE: CHRONIC PAIN;ACUTE PAIN
TYPE: CHRONIC PAIN
TYPE: ACUTE PAIN
TYPE: ACUTE PAIN;CHRONIC PAIN

## 2024-12-29 ASSESSMENT — PAIN DESCRIPTION - LOCATION: LOCATION: HIP;BACK

## 2024-12-29 ASSESSMENT — PAIN DESCRIPTION - FREQUENCY
FREQUENCY: CONTINUOUS

## 2024-12-29 ASSESSMENT — PAIN SCALES - GENERAL
PAINLEVEL_OUTOF10: 9
PAINLEVEL_OUTOF10: 7
PAINLEVEL_OUTOF10: 4
PAINLEVEL_OUTOF10: 6
PAINLEVEL_OUTOF10: 9
PAINLEVEL_OUTOF10: 0
PAINLEVEL_OUTOF10: 9
PAINLEVEL_OUTOF10: 8
PAINLEVEL_OUTOF10: 6
PAINLEVEL_OUTOF10: 5

## 2024-12-29 ASSESSMENT — PAIN DESCRIPTION - ORIENTATION
ORIENTATION: RIGHT;LEFT;POSTERIOR;LOWER
ORIENTATION: RIGHT;LEFT;POSTERIOR;UPPER
ORIENTATION: RIGHT;LEFT;LOWER
ORIENTATION: RIGHT;LEFT;POSTERIOR;LOWER
ORIENTATION: RIGHT;LEFT;POSTERIOR
ORIENTATION: RIGHT;LEFT;POSTERIOR;LOWER
ORIENTATION: RIGHT;LEFT;POSTERIOR

## 2024-12-29 ASSESSMENT — PAIN DESCRIPTION - ONSET
ONSET: ON-GOING
ONSET: SUDDEN
ONSET: ON-GOING
ONSET: SUDDEN
ONSET: ON-GOING
ONSET: SUDDEN
ONSET: ON-GOING

## 2024-12-29 ASSESSMENT — PAIN - FUNCTIONAL ASSESSMENT
PAIN_FUNCTIONAL_ASSESSMENT: PREVENTS OR INTERFERES SOME ACTIVE ACTIVITIES AND ADLS
PAIN_FUNCTIONAL_ASSESSMENT: ACTIVITIES ARE NOT PREVENTED
PAIN_FUNCTIONAL_ASSESSMENT: PREVENTS OR INTERFERES SOME ACTIVE ACTIVITIES AND ADLS
PAIN_FUNCTIONAL_ASSESSMENT: PREVENTS OR INTERFERES SOME ACTIVE ACTIVITIES AND ADLS

## 2024-12-29 NOTE — ACP (ADVANCE CARE PLANNING)
Advanced care planning    Demographics    Patient Name  Rayna Lynne   Date of Birth 1960   Medical Record Number  [id number]      Age  64 y.o.   PCP Catrachito Carballo MD   Admit date:  12/28/2024 10:37 PM     Room Number  110/01  @ [facility]       Patient is capable of making informed decision about their healthcare and end of life care     We discussed the patient's current medical conditions, risks, benefits, outcomes and goals of care on the basis of patient's past medical history, including but not limited to Her chronic medical condition(s).    Within the range and scope of current medical situation I answered all the questions from the patient/family.     This was a face-to-face encounter.     Outcome of the discussion.   Continue all advanced life support: CPR, intubation, invasive procedures    30 minutes were spent for the above discussion around advanced care plan decision.      Ashish Villaseñor MD  12/29/2024

## 2024-12-29 NOTE — ED NOTES
Assumed care of pt at this time. Pt resting on stretcher, on monitor x3. No distress noted at this time. No further needs expressed by pt at this time.

## 2024-12-29 NOTE — H&P
Hospitalist Admission Note    NAME:   Rayna Lynne   : 1960   MRN: [id number]     Date/Time: 2024 8:18 AM    Patient PCP: Catrachito Carballo MD    ______________________________________________________________________  Given the patient's current clinical presentation, I have a high level of concern for decompensation if discharged from the emergency department.  Complex decision making was performed, which includes reviewing the patient's available past medical records, laboratory results, and x-ray films.       My assessment of this patient's clinical condition and my plan of care is as follows.    Assessment / Plan:    Macrocytic anemia  Sickle Cell Crisis  Hx sickle cell syndrome  -Worsening hip pain, joint pain  -On hydrocodone and fentanyl patch at home not helping  - Admit to medicine  - Hemoglobin on admission [phone]  -Monitor H&H daily transfuse if below   - No evidence suggestive of active bleed  -Will start IV fluid  - Will start IV Dilaudid as needed, continue fentanyl patch  -Oxygen supplement as needed  -Continue home medications hydroxyurea folic acid, Zofran, Benadryl        History of HTN  -Continue home medication amlodipine and Micardis      History of depression  -Continue home medication fluoxetine    Hypokalemia  CKD stage III  - Monitor electrolytes, avoid nephrotoxins        Medical Decision Making:   I personally reviewed labs: cbc  I personally reviewed imaging:bmp  I personally reviewed EKG:  Toxic drug monitoring:  na  Discussed case with: ED provider. After discussion I am in agreement that acuity of patient's medical condition necessitates hospital stay.      Code Status: full  DVT Prophylaxis: Lovenox  Baseline:     Subjective:   CHIEF COMPLAINT:Sickle cell pain crisis    HISTORY OF PRESENT ILLNESS:     Rayna Lynne is a 64 y.o.  female with PMHx significant for sickle cell disease, anemia, chronic pain, depression, HTN who comes into the ED with complaints of  TECHNIQUE: Semiupright portable chest AP view FINDINGS: The cardiac silhouette is within normal limits. The pulmonary vasculature is within normal limits.  The lungs and pleural spaces are clear. The visualized bones and upper abdomen are age-appropriate.     No acute process on portable chest. Electronically signed by Wayne Toribio    X-ray lumbar spine complete 4+ views    Result Date: 12/19/2024  AP, Lat, Flexion, Extension.     Review 4 views of lumbar spine AP and lateral significant for thoracolumbar scoliosis.  Multilevel degenerative disc disease from L1-S1.  No fracture no dislocation.  Images were reviewed by me personally and independent    XR CERVICAL SPINE (4-5 VIEWS)    Result Date: 12/19/2024  AP, Lateral, Flexion, Extension.     Review 4 views of cervical spine AP, lateral flexion-extension significant for DDD C4-C5 C5-6 C6-7.  Facet joint DJD from C4-C7 no fracture no dislocation.  Images were reviewed by me personally and independently     _______________________________________________________________________    TOTAL TIME:  76 Minutes    Critical Care Provided     Minutes non procedure based    Signed: Ashish Villaseñor MD    Procedures: see electronic medical records for all procedures/Xrays and details which were not copied into this note but were reviewed prior to creation of Plan.

## 2024-12-29 NOTE — ED NOTES
TRANSFER - OUT REPORT:    Verbal report given to Nataliia JANG on Rayna Lynne  being transferred to 110 for routine progression of patient care       Report consisted of patient's Situation, Background, Assessment and   Recommendations(SBAR).     Information from the following report(s) Nurse Handoff Report, ED Encounter Summary, ED SBAR, MAR, Recent Results, and Event Log was reviewed with the receiving nurse.           Lines:   Peripheral IV 12/28/24 Proximal;Right Forearm (Active)        Opportunity for questions and clarification was provided.      Patient transported with:  Tech

## 2024-12-29 NOTE — ED PROVIDER NOTES
[facility] EMERGENCY DEPT  EMERGENCY DEPARTMENT ENCOUNTER       Pt Name: Rayna Lynne  MRN: [id number]  Birthdate 1960  Date of evaluation: 12/28/2024  Provider: Wayne Coles DO   PCP: Catrachito Carballo MD  Note Started: 3:48 AM EST 12/29/24     CHIEF COMPLAINT       Chief Complaint   Patient presents with    Sickle Cell Pain Crisis     Through triage with sickle cell crisis that started yesterday and was relieved by hydrocodone and fent patch (currently on her body), but now becoming unbearable.         HISTORY OF PRESENT ILLNESS: 1 or more elements      History From: Patient, History limited by: none     Rayna Lynne is a 64 y.o. female presents to the emergency department for evaluation of sickle cell pain       Please See MDM for Additional Details of the HPI/PMH  Nursing Notes were all reviewed and agreed with or any disagreements were addressed in the HPI.     REVIEW OF SYSTEMS        Positives and Pertinent negatives as per HPI.    PAST HISTORY     Past Medical History:  Past Medical History:   Diagnosis Date    Anemia     SC hemoglobinopathy    Chronic pain     Depression     Hypertension     Liver disease     hepatitis C; pt reports \"cured\"    Sickle cell disease (HCC)        Past Surgical History:  Past Surgical History:   Procedure Laterality Date    BREAST BIOPSY Right 05/2007    negative    BREAST SURGERY      2 cysts removed from R breast    CHEST SURGERY      status post thor for removal of a benign     CHOLECYSTECTOMY      ORTHOPEDIC SURGERY      bony curettage of an ostial myelitis focus       Family History:  Family History   Problem Relation Age of Onset    Hypertension Father     Hypertension Mother        Social History:  Social History     Tobacco Use    Smoking status: Never     Passive exposure: Never    Smokeless tobacco: Never   Vaping Use    Vaping status: Never Used   Substance Use Topics    Alcohol use: No    Drug use: No       Allergies:  Allergies   Allergen Reactions     syntax, homophones, and other interpretive errors are inadvertently transcribed by the computer software. Please disregards these errors. Please excuse any errors that have escaped final proofreading.)          Wayne Coles,   12/29/24 0052

## 2024-12-29 NOTE — ED NOTES
Bedside shift change report given to Najma RN (oncoming nurse) by Aisha RN (offgoing nurse). Report included the following information ED Encounter Summary, ED SBAR, Adult Overview, MAR, and Neuro Assessment.

## 2024-12-29 NOTE — PROGRESS NOTES
End of Shift Note    Bedside shift change report given to Nannette JANG (oncoming nurse) by Wendy Galarza, RN (offgoing nurse).  Report included the following information SBAR, Kardex, Intake/Output, MAR, Recent Results, and Cardiac Rhythm NSR    Shift worked:  day     Shift summary and any significant changes:     VS stable, pt upadlib to bathroom voiding without complaints of difficulty, PRN diluadid given for complaints of pain, PRN zofran given for complaints of nausea, PRN benadryl given for complaints of itching     Concerns for physician to address:       Zone phone for oncoming shift:          Activity:     Number times ambulated in hallways past shift: 0  Number of times OOB to chair past shift: 0    Cardiac:   Cardiac Monitoring: Yes      Cardiac Rhythm: Sinus rhythm    Access:  Current line(s): PIV     Genitourinary:        Respiratory:   O2 Device: None (Room air)  Chronic home O2 use?: NO  Incentive spirometer at bedside: N/A    GI:  Last BM (including prior to admit): 12/26/24  Current diet:  ADULT DIET; Regular; 3 carb choices (45 gm/meal)  Passing flatus: YES    Pain Management:   Patient states pain is manageable on current regimen: YES    Skin:  Carlo Scale Score: 23  Interventions: Wound Offloading (Prevention Methods): Bed, pressure reduction mattress, Blankets, Elevate heels, Pillows, Repositioning, Turning, Walker    Patient Safety:  Fall Risk: Nursing Judgement-Fall Risk High(Add Comments): Yes  Fall Risk Interventions  Nursing Judgement-Fall Risk High(Add Comments): Yes  Toilet Every 2 Hours-In Advance of Need: Yes  Hourly Visual Checks: In bed  Fall Visual Posted: Armband, Socks, Fall sign posted  Room Door Open: Deferred to promote rest  Patient Moved Closer to Nursing Station: No    Active Consults:   IP CONSULT TO HEMATOLOGY    Length of Stay:  Expected LOS: 3  Actual LOS: 0    Wendy Galarza, RN

## 2024-12-29 NOTE — CONSULTS
Contacted by Dr. De León and notified of consult for tomorrow. Will see tomorrow. Last seen by our group in 2019 if our records are accurate.     Thanks! Karen Baker MD FACP

## 2024-12-29 NOTE — PROGRESS NOTES
Dr. Gandhi notified for hematology consult admitted for sickle cell crisis, and optimize outpatient regimen. Call back number [phone] given.

## 2024-12-29 NOTE — ED NOTES
Pt wife asked this RN if we could place pt on 2L O2. Pt states he wears 2L at night and would feel more comfortable with it on at this time

## 2024-12-30 LAB
ANION GAP SERPL CALC-SCNC: 2 MMOL/L (ref 2–12)
BASOPHILS # BLD: 0.1 K/UL (ref 0–0.1)
BASOPHILS NFR BLD: 1 % (ref 0–1)
BUN SERPL-MCNC: 8 MG/DL (ref 6–20)
BUN/CREAT SERPL: 9 (ref 12–20)
CALCIUM SERPL-MCNC: 8.6 MG/DL (ref 8.5–10.1)
CHLORIDE SERPL-SCNC: 105 MMOL/L (ref 97–108)
CO2 SERPL-SCNC: 30 MMOL/L (ref 21–32)
CREAT SERPL-MCNC: 0.86 MG/DL (ref 0.55–1.02)
DIFFERENTIAL METHOD BLD: ABNORMAL
EOSINOPHIL # BLD: 0.1 K/UL (ref 0–0.4)
EOSINOPHIL NFR BLD: 2 % (ref 0–7)
ERYTHROCYTE [DISTWIDTH] IN BLOOD BY AUTOMATED COUNT: 15 % (ref 11.5–14.5)
GLUCOSE SERPL-MCNC: 83 MG/DL (ref 65–100)
HCT VFR BLD AUTO: 26 % (ref 35–47)
HGB BLD-MCNC: 9.3 G/DL (ref 11.5–16)
IMM GRANULOCYTES # BLD AUTO: 0 K/UL (ref 0–0.04)
IMM GRANULOCYTES NFR BLD AUTO: 0 % (ref 0–0.5)
LYMPHOCYTES # BLD: 3.2 K/UL (ref 0.8–3.5)
LYMPHOCYTES NFR BLD: 53 % (ref 12–49)
MAGNESIUM SERPL-MCNC: 1.9 MG/DL (ref 1.6–2.4)
MCH RBC QN AUTO: 39.2 PG (ref 26–34)
MCHC RBC AUTO-ENTMCNC: 35.8 G/DL (ref 30–36.5)
MCV RBC AUTO: 109.7 FL (ref 80–99)
MONOCYTES # BLD: 0.9 K/UL (ref 0–1)
MONOCYTES NFR BLD: 14 % (ref 5–13)
NEUTS SEG # BLD: 1.9 K/UL (ref 1.8–8)
NEUTS SEG NFR BLD: 30 % (ref 32–75)
NRBC # BLD: 1.57 K/UL (ref 0–0.01)
NRBC BLD-RTO: 25.5 PER 100 WBC
PHOSPHATE SERPL-MCNC: 3.4 MG/DL (ref 2.6–4.7)
PLATELET # BLD AUTO: 202 K/UL (ref 150–400)
PMV BLD AUTO: 10.8 FL (ref 8.9–12.9)
POTASSIUM SERPL-SCNC: 3.8 MMOL/L (ref 3.5–5.1)
RBC # BLD AUTO: 2.37 M/UL (ref 3.8–5.2)
RBC MORPH BLD: ABNORMAL
RBC MORPH BLD: ABNORMAL
SODIUM SERPL-SCNC: 137 MMOL/L (ref 136–145)
WBC # BLD AUTO: 6.2 K/UL (ref 3.6–11)

## 2024-12-30 PROCEDURE — 2500000003 HC RX 250 WO HCPCS: Performed by: STUDENT IN AN ORGANIZED HEALTH CARE EDUCATION/TRAINING PROGRAM

## 2024-12-30 PROCEDURE — 1100000003 HC PRIVATE W/ TELEMETRY

## 2024-12-30 PROCEDURE — 6360000002 HC RX W HCPCS: Performed by: STUDENT IN AN ORGANIZED HEALTH CARE EDUCATION/TRAINING PROGRAM

## 2024-12-30 PROCEDURE — 85025 COMPLETE CBC W/AUTO DIFF WBC: CPT

## 2024-12-30 PROCEDURE — 76937 US GUIDE VASCULAR ACCESS: CPT

## 2024-12-30 PROCEDURE — 99221 1ST HOSP IP/OBS SF/LOW 40: CPT | Performed by: INTERNAL MEDICINE

## 2024-12-30 PROCEDURE — 6360000002 HC RX W HCPCS: Performed by: INTERNAL MEDICINE

## 2024-12-30 PROCEDURE — 80048 BASIC METABOLIC PNL TOTAL CA: CPT

## 2024-12-30 PROCEDURE — 84100 ASSAY OF PHOSPHORUS: CPT

## 2024-12-30 PROCEDURE — 2580000003 HC RX 258: Performed by: HOSPITALIST

## 2024-12-30 PROCEDURE — 2500000003 HC RX 250 WO HCPCS: Performed by: INTERNAL MEDICINE

## 2024-12-30 PROCEDURE — 6370000000 HC RX 637 (ALT 250 FOR IP): Performed by: STUDENT IN AN ORGANIZED HEALTH CARE EDUCATION/TRAINING PROGRAM

## 2024-12-30 PROCEDURE — 83735 ASSAY OF MAGNESIUM: CPT

## 2024-12-30 PROCEDURE — 36415 COLL VENOUS BLD VENIPUNCTURE: CPT

## 2024-12-30 RX ORDER — SODIUM CHLORIDE AND POTASSIUM CHLORIDE 150; 900 MG/100ML; MG/100ML
INJECTION, SOLUTION INTRAVENOUS CONTINUOUS
Status: DISCONTINUED | OUTPATIENT
Start: 2024-12-30 | End: 2024-12-31

## 2024-12-30 RX ORDER — HYDROMORPHONE HYDROCHLORIDE 1 MG/ML
0.5 INJECTION, SOLUTION INTRAMUSCULAR; INTRAVENOUS; SUBCUTANEOUS EVERY 4 HOURS PRN
Status: DISCONTINUED | OUTPATIENT
Start: 2024-12-30 | End: 2024-12-31

## 2024-12-30 RX ORDER — HYDROMORPHONE HYDROCHLORIDE 1 MG/ML
0.5 INJECTION, SOLUTION INTRAMUSCULAR; INTRAVENOUS; SUBCUTANEOUS
Status: DISCONTINUED | OUTPATIENT
Start: 2024-12-30 | End: 2024-12-30

## 2024-12-30 RX ORDER — HYDROMORPHONE HYDROCHLORIDE 1 MG/ML
0.25 INJECTION, SOLUTION INTRAMUSCULAR; INTRAVENOUS; SUBCUTANEOUS
Status: DISCONTINUED | OUTPATIENT
Start: 2024-12-30 | End: 2024-12-31

## 2024-12-30 RX ORDER — HYDROMORPHONE HYDROCHLORIDE 1 MG/ML
0.25 INJECTION, SOLUTION INTRAMUSCULAR; INTRAVENOUS; SUBCUTANEOUS
Status: DISCONTINUED | OUTPATIENT
Start: 2024-12-30 | End: 2024-12-30

## 2024-12-30 RX ADMIN — SODIUM CHLORIDE: 9 INJECTION, SOLUTION INTRAVENOUS at 03:54

## 2024-12-30 RX ADMIN — FLUOXETINE HYDROCHLORIDE 10 MG: 10 CAPSULE ORAL at 09:40

## 2024-12-30 RX ADMIN — HYDROMORPHONE HYDROCHLORIDE 0.5 MG: 1 INJECTION, SOLUTION INTRAMUSCULAR; INTRAVENOUS; SUBCUTANEOUS at 03:54

## 2024-12-30 RX ADMIN — HYDROXYUREA 500 MG: 500 CAPSULE ORAL at 09:40

## 2024-12-30 RX ADMIN — HYDROMORPHONE HYDROCHLORIDE 0.5 MG: 1 INJECTION, SOLUTION INTRAMUSCULAR; INTRAVENOUS; SUBCUTANEOUS at 00:56

## 2024-12-30 RX ADMIN — DIPHENHYDRAMINE HYDROCHLORIDE 25 MG: 25 CAPSULE ORAL at 14:09

## 2024-12-30 RX ADMIN — HYDROMORPHONE HYDROCHLORIDE 0.5 MG: 1 INJECTION, SOLUTION INTRAMUSCULAR; INTRAVENOUS; SUBCUTANEOUS at 15:12

## 2024-12-30 RX ADMIN — CYANOCOBALAMIN TAB 500 MCG 1000 MCG: 500 TAB at 09:40

## 2024-12-30 RX ADMIN — POTASSIUM CHLORIDE AND SODIUM CHLORIDE: 900; 150 INJECTION, SOLUTION INTRAVENOUS at 17:18

## 2024-12-30 RX ADMIN — HYDROXYUREA 500 MG: 500 CAPSULE ORAL at 21:16

## 2024-12-30 RX ADMIN — ENOXAPARIN SODIUM 40 MG: 100 INJECTION SUBCUTANEOUS at 09:40

## 2024-12-30 RX ADMIN — LOSARTAN POTASSIUM 100 MG: 100 TABLET, FILM COATED ORAL at 09:40

## 2024-12-30 RX ADMIN — POLYETHYLENE GLYCOL 3350 17 G: 17 POWDER, FOR SOLUTION ORAL at 09:39

## 2024-12-30 RX ADMIN — DIPHENHYDRAMINE HYDROCHLORIDE 25 MG: 25 CAPSULE ORAL at 00:55

## 2024-12-30 RX ADMIN — FOLIC ACID 1000 MCG: 1 TABLET ORAL at 09:40

## 2024-12-30 RX ADMIN — HYDROMORPHONE HYDROCHLORIDE 0.5 MG: 1 INJECTION, SOLUTION INTRAMUSCULAR; INTRAVENOUS; SUBCUTANEOUS at 07:44

## 2024-12-30 RX ADMIN — AMLODIPINE BESYLATE 5 MG: 5 TABLET ORAL at 09:40

## 2024-12-30 RX ADMIN — HYDROMORPHONE HYDROCHLORIDE 0.5 MG: 1 INJECTION, SOLUTION INTRAMUSCULAR; INTRAVENOUS; SUBCUTANEOUS at 11:05

## 2024-12-30 RX ADMIN — POTASSIUM CHLORIDE AND SODIUM CHLORIDE: 900; 150 INJECTION, SOLUTION INTRAVENOUS at 13:34

## 2024-12-30 RX ADMIN — SODIUM CHLORIDE, PRESERVATIVE FREE 10 ML: 5 INJECTION INTRAVENOUS at 21:20

## 2024-12-30 RX ADMIN — HYDROMORPHONE HYDROCHLORIDE 0.5 MG: 1 INJECTION, SOLUTION INTRAMUSCULAR; INTRAVENOUS; SUBCUTANEOUS at 21:13

## 2024-12-30 RX ADMIN — ONDANSETRON 4 MG: 2 INJECTION INTRAMUSCULAR; INTRAVENOUS at 09:37

## 2024-12-30 RX ADMIN — ONDANSETRON 4 MG: 2 INJECTION INTRAMUSCULAR; INTRAVENOUS at 15:12

## 2024-12-30 RX ADMIN — SODIUM CHLORIDE, PRESERVATIVE FREE 10 ML: 5 INJECTION INTRAVENOUS at 01:03

## 2024-12-30 RX ADMIN — ONDANSETRON 4 MG: 2 INJECTION INTRAMUSCULAR; INTRAVENOUS at 01:01

## 2024-12-30 ASSESSMENT — PAIN DESCRIPTION - DESCRIPTORS
DESCRIPTORS: ACHING;SORE
DESCRIPTORS: ACHING
DESCRIPTORS: ACHING;SORE
DESCRIPTORS: ACHING
DESCRIPTORS: ACHING
DESCRIPTORS: ACHING;SORE
DESCRIPTORS: ACHING
DESCRIPTORS: ACHING
DESCRIPTORS: ACHING;SORE

## 2024-12-30 ASSESSMENT — PAIN SCALES - GENERAL
PAINLEVEL_OUTOF10: 10
PAINLEVEL_OUTOF10: 4
PAINLEVEL_OUTOF10: 0
PAINLEVEL_OUTOF10: 9
PAINLEVEL_OUTOF10: 5
PAINLEVEL_OUTOF10: 9
PAINLEVEL_OUTOF10: 4
PAINLEVEL_OUTOF10: 0
PAINLEVEL_OUTOF10: 9
PAINLEVEL_OUTOF10: 10
PAINLEVEL_OUTOF10: 10
PAINLEVEL_OUTOF10: 9
PAINLEVEL_OUTOF10: 9
PAINLEVEL_OUTOF10: 0

## 2024-12-30 ASSESSMENT — PAIN DESCRIPTION - LOCATION
LOCATION: BACK;GROIN
LOCATION: BACK;ELBOW;HIP
LOCATION: BACK;ELBOW;HIP
LOCATION: BACK;GROIN
LOCATION: BACK;GROIN
LOCATION: BACK;ELBOW;HIP

## 2024-12-30 ASSESSMENT — PAIN DESCRIPTION - ORIENTATION
ORIENTATION: RIGHT;LEFT
ORIENTATION: LEFT;RIGHT;POSTERIOR
ORIENTATION: RIGHT;LEFT
ORIENTATION: RIGHT;LEFT
ORIENTATION: RIGHT;LEFT;POSTERIOR

## 2024-12-30 ASSESSMENT — PAIN DESCRIPTION - ONSET
ONSET: ON-GOING

## 2024-12-30 ASSESSMENT — PAIN DESCRIPTION - PAIN TYPE
TYPE: CHRONIC PAIN

## 2024-12-30 ASSESSMENT — PAIN DESCRIPTION - FREQUENCY: FREQUENCY: CONTINUOUS

## 2024-12-30 ASSESSMENT — PAIN - FUNCTIONAL ASSESSMENT
PAIN_FUNCTIONAL_ASSESSMENT: ACTIVITIES ARE NOT PREVENTED
PAIN_FUNCTIONAL_ASSESSMENT: ACTIVITIES ARE NOT PREVENTED
PAIN_FUNCTIONAL_ASSESSMENT: PREVENTS OR INTERFERES SOME ACTIVE ACTIVITIES AND ADLS
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
## 2024-12-30 NOTE — PROGRESS NOTES
End of Shift Note    Bedside shift change report given to Marc JANG (oncoming nurse) by Nannette Campos RN (offgoing nurse).  Report included the following information SBAR, Kardex, ED Summary, Intake/Output, MAR, and Recent Results NSR cardiac rhythm     Shift worked:  9253-1060     Shift summary and any significant changes:     Patient c/o pain to groin, elbow and shoulders.  Medicate with Dilaudid with good to moderate relief.  PRN benadryl for itching and Zofran for nausea.  IV fluids infusing.  Vitals stable.  Ad clifton to BR voiding QS.  Tolerating PO.       Concerns for physician to address:       Zone phone for oncoming shift:          Activity:  Level of Assistance: Independent  Number times ambulated in hallways past shift: 0  Number of times OOB to chair past shift: 0    Cardiac:   Cardiac Monitoring: Yes      Cardiac Rhythm: Sinus rhythm    Access:  Current line(s): PIV     Genitourinary:   Urinary Status: Voiding    Respiratory:   O2 Device: None (Room air)  Chronic home O2 use?: NO  Incentive spirometer at bedside: NO    GI:  Last BM (including prior to admit): 12/26/24  Current diet:  ADULT DIET; Regular; 3 carb choices (45 gm/meal)  Passing flatus: YES    Pain Management:   Patient states pain is manageable on current regimen: YES    Skin:  Carlo Scale Score: 21  Interventions: Wound Offloading (Prevention Methods): Pillows, Repositioning, Turning    Patient Safety:  Fall Risk: Nursing Judgement-Fall Risk High(Add Comments): Yes  Fall Risk Interventions  Nursing Judgement-Fall Risk High(Add Comments): Yes  Toilet Every 2 Hours-In Advance of Need: Yes  Hourly Visual Checks: In bed, Eyes closed  Fall Visual Posted: Socks, Fall sign posted  Room Door Open: Deferred to decrease stimulation  Alarm On: Personal  Patient Moved Closer to Nursing Station: No    Active Consults:   IP CONSULT TO HEMATOLOGY    Length of Stay:  Expected LOS: 3  Actual LOS: 1    Nannette Campos RN

## 2024-12-30 NOTE — PLAN OF CARE
Problem: Pain  Goal: Verbalizes/displays adequate comfort level or baseline comfort level  12/30/2024 1542 by Fina Martin RN  Outcome: Progressing  12/30/2024 0700 by Nannette Campos RN  Outcome: Not Progressing     Problem: Safety - Adult  Goal: Free from fall injury  12/30/2024 1542 by Fina Martin RN  Outcome: Progressing  12/30/2024 0700 by Nannette Campos RN  Outcome: Progressing  Flowsheets (Taken 12/29/2024 1945)  Free From Fall Injury:   Instruct family/caregiver on patient safety   Based on caregiver fall risk screen, instruct family/caregiver to ask for assistance with transferring infant if caregiver noted to have fall risk factors     Problem: Pain  Goal: Verbalizes/displays adequate comfort level or baseline comfort level  12/30/2024 1542 by Fina Martin RN  Outcome: Progressing  12/30/2024 0700 by Nannette Campos RN  Outcome: Not Progressing

## 2024-12-30 NOTE — PROGRESS NOTES
General Daily Progress Note    Admit Date: 12/28/2024    Subjective:     Patient continues to complain of pain but improving    Current Facility-Administered Medications   Medication Dose Route Frequency    0.9% NaCl with KCl 20 mEq infusion   IntraVENous Continuous    HYDROmorphone HCl PF (DILAUDID) injection 0.25 mg  0.25 mg IntraVENous Q3H PRN    Or    HYDROmorphone HCl PF (DILAUDID) injection 0.5 mg  0.5 mg IntraVENous Q4H PRN    ondansetron (ZOFRAN) injection 4 mg  4 mg IntraVENous Q6H PRN    diphenhydrAMINE (BENADRYL) capsule 25 mg  25 mg Oral Q6H PRN    amLODIPine (NORVASC) tablet 5 mg  5 mg Oral Daily    fentaNYL (DURAGESIC) 75 MCG/HR 1 patch  1 patch TransDERmal Q72H    FLUoxetine (PROZAC) capsule 10 mg  10 mg Oral Daily    folic acid (FOLVITE) tablet 1,000 mcg  1,000 mcg Oral Daily    hydroxyurea (HYDREA) chemo capsule 500 mg  500 mg Oral BID    promethazine (PHENERGAN) tablet 25 mg  25 mg Oral Q8H PRN    losartan (COZAAR) tablet 100 mg  100 mg Oral Daily    vitamin B-12 (CYANOCOBALAMIN) tablet 1,000 mcg  1,000 mcg Oral Daily    sodium chloride flush 0.9 % injection 5-40 mL  5-40 mL IntraVENous 2 times per day    sodium chloride flush 0.9 % injection 5-40 mL  5-40 mL IntraVENous PRN    0.9 % sodium chloride infusion   IntraVENous PRN    potassium chloride (KLOR-CON M) extended release tablet 40 mEq  40 mEq Oral PRN    Or    potassium bicarb-citric acid (EFFER-K) effervescent tablet 40 mEq  40 mEq Oral PRN    Or    potassium chloride 10 mEq/100 mL IVPB (Peripheral Line)  10 mEq IntraVENous PRN    magnesium sulfate 2000 mg in 50 mL IVPB premix  2,000 mg IntraVENous PRN    enoxaparin (LOVENOX) injection 40 mg  40 mg SubCUTAneous Daily    polyethylene glycol (GLYCOLAX) packet 17 g  17 g Oral Daily PRN    acetaminophen (TYLENOL) tablet 650 mg  650 mg Oral Q6H PRN    Or    acetaminophen (TYLENOL) suppository 650 mg  650 mg Rectal Q6H PRN    hydrocortisone 1 % cream   Topical BID        Review of

## 2024-12-31 LAB
ANION GAP SERPL CALC-SCNC: 0 MMOL/L (ref 2–12)
BUN SERPL-MCNC: 8 MG/DL (ref 6–20)
BUN/CREAT SERPL: 11 (ref 12–20)
CALCIUM SERPL-MCNC: 8.8 MG/DL (ref 8.5–10.1)
CHLORIDE SERPL-SCNC: 103 MMOL/L (ref 97–108)
CO2 SERPL-SCNC: 32 MMOL/L (ref 21–32)
CREAT SERPL-MCNC: 0.72 MG/DL (ref 0.55–1.02)
EKG ATRIAL RATE: 80 BPM
EKG DIAGNOSIS: NORMAL
EKG P AXIS: 50 DEGREES
EKG P-R INTERVAL: 132 MS
EKG Q-T INTERVAL: 386 MS
EKG QRS DURATION: 74 MS
EKG QTC CALCULATION (BAZETT): 445 MS
EKG R AXIS: 54 DEGREES
EKG T AXIS: 44 DEGREES
EKG VENTRICULAR RATE: 80 BPM
GLUCOSE SERPL-MCNC: 88 MG/DL (ref 65–100)
POTASSIUM SERPL-SCNC: 4.6 MMOL/L (ref 3.5–5.1)
SODIUM SERPL-SCNC: 135 MMOL/L (ref 136–145)

## 2024-12-31 PROCEDURE — 36415 COLL VENOUS BLD VENIPUNCTURE: CPT

## 2024-12-31 PROCEDURE — 6360000002 HC RX W HCPCS: Performed by: STUDENT IN AN ORGANIZED HEALTH CARE EDUCATION/TRAINING PROGRAM

## 2024-12-31 PROCEDURE — 2500000003 HC RX 250 WO HCPCS: Performed by: STUDENT IN AN ORGANIZED HEALTH CARE EDUCATION/TRAINING PROGRAM

## 2024-12-31 PROCEDURE — 2580000003 HC RX 258: Performed by: INTERNAL MEDICINE

## 2024-12-31 PROCEDURE — 99221 1ST HOSP IP/OBS SF/LOW 40: CPT | Performed by: INTERNAL MEDICINE

## 2024-12-31 PROCEDURE — 6370000000 HC RX 637 (ALT 250 FOR IP): Performed by: STUDENT IN AN ORGANIZED HEALTH CARE EDUCATION/TRAINING PROGRAM

## 2024-12-31 PROCEDURE — 76937 US GUIDE VASCULAR ACCESS: CPT

## 2024-12-31 PROCEDURE — 2500000003 HC RX 250 WO HCPCS: Performed by: INTERNAL MEDICINE

## 2024-12-31 PROCEDURE — 1100000003 HC PRIVATE W/ TELEMETRY

## 2024-12-31 PROCEDURE — 6360000002 HC RX W HCPCS: Performed by: INTERNAL MEDICINE

## 2024-12-31 PROCEDURE — 80048 BASIC METABOLIC PNL TOTAL CA: CPT

## 2024-12-31 RX ORDER — SODIUM CHLORIDE 450 MG/100ML
INJECTION, SOLUTION INTRAVENOUS CONTINUOUS
Status: DISCONTINUED | OUTPATIENT
Start: 2024-12-31 | End: 2025-01-04 | Stop reason: HOSPADM

## 2024-12-31 RX ORDER — HYDROMORPHONE HYDROCHLORIDE 1 MG/ML
0.25 INJECTION, SOLUTION INTRAMUSCULAR; INTRAVENOUS; SUBCUTANEOUS
Status: DISCONTINUED | OUTPATIENT
Start: 2024-12-31 | End: 2025-01-02

## 2024-12-31 RX ORDER — HYDROMORPHONE HYDROCHLORIDE 1 MG/ML
0.5 INJECTION, SOLUTION INTRAMUSCULAR; INTRAVENOUS; SUBCUTANEOUS
Status: DISCONTINUED | OUTPATIENT
Start: 2024-12-31 | End: 2025-01-02

## 2024-12-31 RX ADMIN — HYDROMORPHONE HYDROCHLORIDE 0.5 MG: 1 INJECTION, SOLUTION INTRAMUSCULAR; INTRAVENOUS; SUBCUTANEOUS at 13:50

## 2024-12-31 RX ADMIN — ONDANSETRON 4 MG: 2 INJECTION INTRAMUSCULAR; INTRAVENOUS at 20:17

## 2024-12-31 RX ADMIN — HYDROMORPHONE HYDROCHLORIDE 0.5 MG: 1 INJECTION, SOLUTION INTRAMUSCULAR; INTRAVENOUS; SUBCUTANEOUS at 06:00

## 2024-12-31 RX ADMIN — ONDANSETRON 4 MG: 2 INJECTION INTRAMUSCULAR; INTRAVENOUS at 08:56

## 2024-12-31 RX ADMIN — DIPHENHYDRAMINE HYDROCHLORIDE 25 MG: 25 CAPSULE ORAL at 20:14

## 2024-12-31 RX ADMIN — HYDROMORPHONE HYDROCHLORIDE 0.5 MG: 1 INJECTION, SOLUTION INTRAMUSCULAR; INTRAVENOUS; SUBCUTANEOUS at 17:04

## 2024-12-31 RX ADMIN — HYDROXYUREA 500 MG: 500 CAPSULE ORAL at 20:14

## 2024-12-31 RX ADMIN — FLUOXETINE HYDROCHLORIDE 10 MG: 10 CAPSULE ORAL at 08:46

## 2024-12-31 RX ADMIN — SODIUM CHLORIDE, PRESERVATIVE FREE 10 ML: 5 INJECTION INTRAVENOUS at 08:50

## 2024-12-31 RX ADMIN — AMLODIPINE BESYLATE 5 MG: 5 TABLET ORAL at 08:46

## 2024-12-31 RX ADMIN — CYANOCOBALAMIN TAB 500 MCG 1000 MCG: 500 TAB at 08:46

## 2024-12-31 RX ADMIN — FOLIC ACID 1000 MCG: 1 TABLET ORAL at 08:46

## 2024-12-31 RX ADMIN — ENOXAPARIN SODIUM 40 MG: 100 INJECTION SUBCUTANEOUS at 08:46

## 2024-12-31 RX ADMIN — POLYETHYLENE GLYCOL 3350 17 G: 17 POWDER, FOR SOLUTION ORAL at 08:56

## 2024-12-31 RX ADMIN — SODIUM CHLORIDE: 4.5 INJECTION, SOLUTION INTRAVENOUS at 13:51

## 2024-12-31 RX ADMIN — SODIUM CHLORIDE, PRESERVATIVE FREE 10 ML: 5 INJECTION INTRAVENOUS at 20:18

## 2024-12-31 RX ADMIN — HYDROMORPHONE HYDROCHLORIDE 0.5 MG: 1 INJECTION, SOLUTION INTRAMUSCULAR; INTRAVENOUS; SUBCUTANEOUS at 10:45

## 2024-12-31 RX ADMIN — HYDROMORPHONE HYDROCHLORIDE 0.5 MG: 1 INJECTION, SOLUTION INTRAMUSCULAR; INTRAVENOUS; SUBCUTANEOUS at 20:17

## 2024-12-31 RX ADMIN — HYDROXYUREA 500 MG: 500 CAPSULE ORAL at 08:46

## 2024-12-31 RX ADMIN — HYDROMORPHONE HYDROCHLORIDE 0.5 MG: 1 INJECTION, SOLUTION INTRAMUSCULAR; INTRAVENOUS; SUBCUTANEOUS at 01:17

## 2024-12-31 RX ADMIN — LOSARTAN POTASSIUM 100 MG: 100 TABLET, FILM COATED ORAL at 08:46

## 2024-12-31 RX ADMIN — DIPHENHYDRAMINE HYDROCHLORIDE 25 MG: 25 CAPSULE ORAL at 08:56

## 2024-12-31 ASSESSMENT — PAIN DESCRIPTION - DESCRIPTORS
DESCRIPTORS: ACHING
DESCRIPTORS: ACHING;DULL;NAGGING
DESCRIPTORS: ACHING

## 2024-12-31 ASSESSMENT — PAIN - FUNCTIONAL ASSESSMENT: PAIN_FUNCTIONAL_ASSESSMENT: ACTIVITIES ARE NOT PREVENTED

## 2024-12-31 ASSESSMENT — PAIN SCALES - GENERAL
PAINLEVEL_OUTOF10: 6
PAINLEVEL_OUTOF10: 9
PAINLEVEL_OUTOF10: 5
PAINLEVEL_OUTOF10: 4
PAINLEVEL_OUTOF10: 9
PAINLEVEL_OUTOF10: 7
PAINLEVEL_OUTOF10: 10
PAINLEVEL_OUTOF10: 9
PAINLEVEL_OUTOF10: 7

## 2024-12-31 ASSESSMENT — PAIN DESCRIPTION - LOCATION
LOCATION: GENERALIZED

## 2024-12-31 ASSESSMENT — PAIN DESCRIPTION - ORIENTATION
ORIENTATION: POSTERIOR
ORIENTATION: RIGHT;LEFT

## 2024-12-31 NOTE — PLAN OF CARE
Problem: Pain  Goal: Verbalizes/displays adequate comfort level or baseline comfort level  12/31/2024 0817 by Iván Diaz, RN  Outcome: Progressing  12/31/2024 0126 by Nicolle Ryan LPN  Outcome: Progressing     Problem: Safety - Adult  Goal: Free from fall injury  12/31/2024 0817 by Iván Diaz, RN  Outcome: Progressing  12/31/2024 0126 by Nicolle Ryan LPN  Outcome: Progressing

## 2024-12-31 NOTE — PROGRESS NOTES
General Daily Progress Note    Admit Date: 12/28/2024    Subjective:     Patient continues to complain of pain but improving    Current Facility-Administered Medications   Medication Dose Route Frequency    HYDROmorphone HCl PF (DILAUDID) injection 0.25 mg  0.25 mg IntraVENous Q3H PRN    Or    HYDROmorphone HCl PF (DILAUDID) injection 0.5 mg  0.5 mg IntraVENous Q3H PRN    0.45 % sodium chloride infusion   IntraVENous Continuous    ondansetron (ZOFRAN) injection 4 mg  4 mg IntraVENous Q6H PRN    diphenhydrAMINE (BENADRYL) capsule 25 mg  25 mg Oral Q6H PRN    amLODIPine (NORVASC) tablet 5 mg  5 mg Oral Daily    fentaNYL (DURAGESIC) 75 MCG/HR 1 patch  1 patch TransDERmal Q72H    FLUoxetine (PROZAC) capsule 10 mg  10 mg Oral Daily    folic acid (FOLVITE) tablet 1,000 mcg  1,000 mcg Oral Daily    hydroxyurea (HYDREA) chemo capsule 500 mg  500 mg Oral BID    promethazine (PHENERGAN) tablet 25 mg  25 mg Oral Q8H PRN    losartan (COZAAR) tablet 100 mg  100 mg Oral Daily    vitamin B-12 (CYANOCOBALAMIN) tablet 1,000 mcg  1,000 mcg Oral Daily    sodium chloride flush 0.9 % injection 5-40 mL  5-40 mL IntraVENous 2 times per day    sodium chloride flush 0.9 % injection 5-40 mL  5-40 mL IntraVENous PRN    0.9 % sodium chloride infusion   IntraVENous PRN    potassium chloride (KLOR-CON M) extended release tablet 40 mEq  40 mEq Oral PRN    Or    potassium bicarb-citric acid (EFFER-K) effervescent tablet 40 mEq  40 mEq Oral PRN    Or    potassium chloride 10 mEq/100 mL IVPB (Peripheral Line)  10 mEq IntraVENous PRN    magnesium sulfate 2000 mg in 50 mL IVPB premix  2,000 mg IntraVENous PRN    enoxaparin (LOVENOX) injection 40 mg  40 mg SubCUTAneous Daily    polyethylene glycol (GLYCOLAX) packet 17 g  17 g Oral Daily PRN    acetaminophen (TYLENOL) tablet 650 mg  650 mg Oral Q6H PRN    Or    acetaminophen (TYLENOL) suppository 650 mg  650 mg Rectal Q6H PRN    hydrocortisone 1 % cream   Topical BID        Review of  Systems  Musculoskeletal:positive for back pain and extremities    Objective:     Patient Vitals for the past 24 hrs:   BP Temp Temp src Pulse Resp SpO2   12/31/24 1112 124/74 -- -- 77 18 98 %   12/31/24 0800 (!) 154/83 98 °F (36.7 °C) -- 84 16 98 %   12/31/24 0600 -- -- -- -- 18 --   12/31/24 0351 127/83 98.6 °F (37 °C) -- 85 16 98 %   12/31/24 0117 -- -- -- -- 18 --   12/30/24 2143 -- -- -- -- 16 --   12/30/24 2113 -- -- -- -- 18 --   12/30/24 2100 (!) 151/84 99 °F (37.2 °C) Oral 95 17 98 %   12/30/24 1641 133/79 98.8 °F (37.1 °C) -- 87 16 98 %     No intake/output data recorded.  12/29 1901 - 12/31 0700  In: 650 [P.O.:650]  Out: -     Physical Exam: General appearance: alert, appears stated age, and cooperative  Neck: no adenopathy, no carotid bruit, no JVD, supple, symmetrical, trachea midline, and thyroid not enlarged, symmetric, no tenderness/mass/nodules  Lungs: clear to auscultation bilaterally  Heart: regular rate and rhythm, S1, S2 normal, no murmur, click, rub or gallop  Abdomen: soft, non-tender; bowel sounds normal; no masses,  no organomegaly  Extremities: extremities normal, atraumatic, no cyanosis or edema    Assessment:     Principal Problem:    Sickle cell crisis (HCC)  Continue treatment of painful crises which is improving.  Active Problems:    Hypertension  Blood pressure remains reasonable.        Plan:

## 2024-12-31 NOTE — CARE COORDINATION
Care Management Initial Assessment       RUR: 15% Moderate RUR  Readmission? No  1st IM letter given? Yes - Given by patient access on 12/30/24  1st  letter given: N/A    CM met with pt at bedside. CM introduced self and role and completed initial assessment. CM verified demographic and clinical information.     PCP is Dr. Catrachito Carballo, preferred pharmacy is [facility] as listed on the chart.     Pt lives alone in a one level home, [address]. Reports being independent in ADLs. Denies DME at home. Denies O2/CPAP at home. Reports they are supported by family and friends. Patient is an active . Reports/denies history of HH/IPR/SNF.     Pt plans to discharge home and anticipates her brother (Agustin Lynne) or friend to assist with transportation.     Unit CM, LYNN Franco, will continue to follow.     Advance Care Planning     General Advance Care Planning (ACP) Conversation    Date of Conversation: 12/31/2024  Conducted with: Patient with Decision Making Capacity  Other persons present: None    Healthcare Decision Maker:   Primary Decision Maker: Amina Parra - Child - [phone]     Today we documented Decision Maker(s) consistent with Legal Next of Kin hierarchy.  Content/Action Overview:  DECLINED ACP Conversation - will revisit periodically  Reviewed DNR/DNI and patient elects Full Code (Attempt Resuscitation)    Length of Voluntary ACP Conversation in minutes:  <16 minutes (Non-Billable)    Clarissa Piper       12/31/24 1627   Service Assessment   Patient Orientation Alert and Oriented;Person;Place;Situation;Self   Cognition Alert   History Provided By Patient   Primary Caregiver Self   Support Systems Family Members;Children   Patient's Healthcare Decision Maker is: Patient Declined (Legal Next of Kin Remains as Decision Maker)  (LNOK: Amina Parra [phone])   PCP Verified by CM Yes  (Dr. Catrachito Carballo)   Last Visit to PCP Within last 3 months   Prior Functional Level Independent in ADLs/IADLs   Current

## 2024-12-31 NOTE — PLAN OF CARE
Problem: Pain  Goal: Verbalizes/displays adequate comfort level or baseline comfort level  12/31/2024 0126 by Nicolle Ryan LPN  Outcome: Progressing  12/30/2024 1542 by Fina Martin RN  Outcome: Progressing     Problem: Safety - Adult  Goal: Free from fall injury  12/31/2024 0126 by Nicolle Ryan LPN  Outcome: Progressing  12/30/2024 1542 by Fina Martin RN  Outcome: Progressing

## 2024-12-31 NOTE — PROGRESS NOTES
End of Shift Note    Bedside shift change report given to RN (oncoming nurse) by Nicolle Ryan LPN (offgoing nurse).  Report included the following information SBAR, Kardex, OR Summary, Procedure Summary, and Intake/Output    Shift worked:  7p-7a     Shift summary and any significant changes:     VSS  0.5 dilaudid given for pain management  IV Access swollen at site   PICC team called @0811 for new IV acess     Concerns for physician to address:  See above     Zone phone for oncoming shift:   [phone]       Activity:  Level of Assistance: Independent  Number times ambulated in hallways past shift: 0  Number of times OOB to chair past shift: 0    Cardiac:   Cardiac Monitoring: Yes      Cardiac Rhythm: Sinus rhythm    Access:  Current line(s): PIV     Genitourinary:   Urinary Status: Voiding    Respiratory:   O2 Device: None (Room air)  Chronic home O2 use?: YES  Incentive spirometer at bedside: NO    GI:  Last BM (including prior to admit): 12/26/24  Current diet:  ADULT DIET; Regular  Passing flatus: YES    Pain Management:   Patient states pain is manageable on current regimen: YES    Skin:  Carlo Scale Score: 19  Interventions: Wound Offloading (Prevention Methods): Bed, pressure reduction mattress, Elevate heels, Repositioning, Pillows    Patient Safety:  Fall Risk: Nursing Judgement-Fall Risk High(Add Comments): Yes  Fall Risk Interventions  Nursing Judgement-Fall Risk High(Add Comments): Yes  Toilet Every 2 Hours-In Advance of Need: Yes  Hourly Visual Checks: Other (Comment)  Fall Visual Posted: Socks  Room Door Open: Deferred to promote rest  Alarm On: Other (Comment) (none)  Patient Moved Closer to Nursing Station: No    Active Consults:   IP CONSULT TO HEMATOLOGY    Length of Stay:  Expected LOS: 6  Actual LOS: 2    Nicolle Ryan LPN

## 2025-01-01 PROCEDURE — 6360000002 HC RX W HCPCS: Performed by: INTERNAL MEDICINE

## 2025-01-01 PROCEDURE — 2500000003 HC RX 250 WO HCPCS: Performed by: STUDENT IN AN ORGANIZED HEALTH CARE EDUCATION/TRAINING PROGRAM

## 2025-01-01 PROCEDURE — 6370000000 HC RX 637 (ALT 250 FOR IP): Performed by: STUDENT IN AN ORGANIZED HEALTH CARE EDUCATION/TRAINING PROGRAM

## 2025-01-01 PROCEDURE — 6360000002 HC RX W HCPCS: Performed by: STUDENT IN AN ORGANIZED HEALTH CARE EDUCATION/TRAINING PROGRAM

## 2025-01-01 PROCEDURE — 99221 1ST HOSP IP/OBS SF/LOW 40: CPT | Performed by: INTERNAL MEDICINE

## 2025-01-01 PROCEDURE — 2580000003 HC RX 258: Performed by: INTERNAL MEDICINE

## 2025-01-01 PROCEDURE — 1100000003 HC PRIVATE W/ TELEMETRY

## 2025-01-01 RX ORDER — PANTOPRAZOLE SODIUM 40 MG/1
40 TABLET, DELAYED RELEASE ORAL
Status: DISCONTINUED | OUTPATIENT
Start: 2025-01-02 | End: 2025-01-01

## 2025-01-01 RX ORDER — PANTOPRAZOLE SODIUM 40 MG/1
40 TABLET, DELAYED RELEASE ORAL
Status: DISCONTINUED | OUTPATIENT
Start: 2025-01-01 | End: 2025-01-04 | Stop reason: HOSPADM

## 2025-01-01 RX ADMIN — ONDANSETRON 4 MG: 2 INJECTION INTRAMUSCULAR; INTRAVENOUS at 17:02

## 2025-01-01 RX ADMIN — SODIUM CHLORIDE, PRESERVATIVE FREE 10 ML: 5 INJECTION INTRAVENOUS at 07:44

## 2025-01-01 RX ADMIN — HYDROMORPHONE HYDROCHLORIDE 0.5 MG: 1 INJECTION, SOLUTION INTRAMUSCULAR; INTRAVENOUS; SUBCUTANEOUS at 07:42

## 2025-01-01 RX ADMIN — HYDROMORPHONE HYDROCHLORIDE 0.5 MG: 1 INJECTION, SOLUTION INTRAMUSCULAR; INTRAVENOUS; SUBCUTANEOUS at 10:44

## 2025-01-01 RX ADMIN — DIPHENHYDRAMINE HYDROCHLORIDE 25 MG: 25 CAPSULE ORAL at 02:42

## 2025-01-01 RX ADMIN — DIPHENHYDRAMINE HYDROCHLORIDE 25 MG: 25 CAPSULE ORAL at 17:01

## 2025-01-01 RX ADMIN — ONDANSETRON 4 MG: 2 INJECTION INTRAMUSCULAR; INTRAVENOUS at 23:10

## 2025-01-01 RX ADMIN — ONDANSETRON 4 MG: 2 INJECTION INTRAMUSCULAR; INTRAVENOUS at 02:42

## 2025-01-01 RX ADMIN — CYANOCOBALAMIN TAB 500 MCG 1000 MCG: 500 TAB at 07:43

## 2025-01-01 RX ADMIN — HYDROMORPHONE HYDROCHLORIDE 0.5 MG: 1 INJECTION, SOLUTION INTRAMUSCULAR; INTRAVENOUS; SUBCUTANEOUS at 17:01

## 2025-01-01 RX ADMIN — PANTOPRAZOLE SODIUM 40 MG: 40 TABLET, DELAYED RELEASE ORAL at 09:21

## 2025-01-01 RX ADMIN — ONDANSETRON 4 MG: 2 INJECTION INTRAMUSCULAR; INTRAVENOUS at 08:50

## 2025-01-01 RX ADMIN — HYDROXYUREA 500 MG: 500 CAPSULE ORAL at 07:43

## 2025-01-01 RX ADMIN — FOLIC ACID 1000 MCG: 1 TABLET ORAL at 07:43

## 2025-01-01 RX ADMIN — AMLODIPINE BESYLATE 5 MG: 5 TABLET ORAL at 07:43

## 2025-01-01 RX ADMIN — DIPHENHYDRAMINE HYDROCHLORIDE 25 MG: 25 CAPSULE ORAL at 23:09

## 2025-01-01 RX ADMIN — HYDROMORPHONE HYDROCHLORIDE 0.5 MG: 1 INJECTION, SOLUTION INTRAMUSCULAR; INTRAVENOUS; SUBCUTANEOUS at 13:55

## 2025-01-01 RX ADMIN — HYDROMORPHONE HYDROCHLORIDE 0.5 MG: 1 INJECTION, SOLUTION INTRAMUSCULAR; INTRAVENOUS; SUBCUTANEOUS at 23:10

## 2025-01-01 RX ADMIN — ENOXAPARIN SODIUM 40 MG: 100 INJECTION SUBCUTANEOUS at 07:43

## 2025-01-01 RX ADMIN — LOSARTAN POTASSIUM 100 MG: 100 TABLET, FILM COATED ORAL at 07:43

## 2025-01-01 RX ADMIN — HYDROXYUREA 500 MG: 500 CAPSULE ORAL at 20:03

## 2025-01-01 RX ADMIN — HYDROMORPHONE HYDROCHLORIDE 0.5 MG: 1 INJECTION, SOLUTION INTRAMUSCULAR; INTRAVENOUS; SUBCUTANEOUS at 02:43

## 2025-01-01 RX ADMIN — DIPHENHYDRAMINE HYDROCHLORIDE 25 MG: 25 CAPSULE ORAL at 08:50

## 2025-01-01 RX ADMIN — FLUOXETINE HYDROCHLORIDE 10 MG: 10 CAPSULE ORAL at 07:43

## 2025-01-01 RX ADMIN — HYDROMORPHONE HYDROCHLORIDE 0.5 MG: 1 INJECTION, SOLUTION INTRAMUSCULAR; INTRAVENOUS; SUBCUTANEOUS at 20:03

## 2025-01-01 RX ADMIN — SODIUM CHLORIDE: 4.5 INJECTION, SOLUTION INTRAVENOUS at 17:52

## 2025-01-01 RX ADMIN — SODIUM CHLORIDE, PRESERVATIVE FREE 10 ML: 5 INJECTION INTRAVENOUS at 20:07

## 2025-01-01 ASSESSMENT — PAIN SCALES - GENERAL
PAINLEVEL_OUTOF10: 8
PAINLEVEL_OUTOF10: 9
PAINLEVEL_OUTOF10: 8
PAINLEVEL_OUTOF10: 4
PAINLEVEL_OUTOF10: 8
PAINLEVEL_OUTOF10: 9
PAINLEVEL_OUTOF10: 5
PAINLEVEL_OUTOF10: 9
PAINLEVEL_OUTOF10: 3
PAINLEVEL_OUTOF10: 5
PAINLEVEL_OUTOF10: 5
PAINLEVEL_OUTOF10: 3
PAINLEVEL_OUTOF10: 5
PAINLEVEL_OUTOF10: 8

## 2025-01-01 ASSESSMENT — PAIN - FUNCTIONAL ASSESSMENT
PAIN_FUNCTIONAL_ASSESSMENT: PREVENTS OR INTERFERES SOME ACTIVE ACTIVITIES AND ADLS
PAIN_FUNCTIONAL_ASSESSMENT: PREVENTS OR INTERFERES SOME ACTIVE ACTIVITIES AND ADLS
PAIN_FUNCTIONAL_ASSESSMENT: ACTIVITIES ARE NOT PREVENTED
PAIN_FUNCTIONAL_ASSESSMENT: PREVENTS OR INTERFERES SOME ACTIVE ACTIVITIES AND ADLS
PAIN_FUNCTIONAL_ASSESSMENT: ACTIVITIES ARE NOT PREVENTED

## 2025-01-01 ASSESSMENT — PAIN DESCRIPTION - DESCRIPTORS
DESCRIPTORS: ACHING
DESCRIPTORS: ACHING;THROBBING
DESCRIPTORS: ACHING

## 2025-01-01 ASSESSMENT — PAIN DESCRIPTION - ORIENTATION
ORIENTATION: RIGHT;LEFT;POSTERIOR;LOWER
ORIENTATION: RIGHT;LEFT
ORIENTATION: LEFT;RIGHT
ORIENTATION: RIGHT;LEFT;POSTERIOR
ORIENTATION: RIGHT;LEFT
ORIENTATION: RIGHT;LEFT
ORIENTATION: RIGHT;LEFT;POSTERIOR;LOWER
ORIENTATION: RIGHT;LEFT;POSTERIOR
ORIENTATION: RIGHT;LEFT;POSTERIOR
ORIENTATION: LEFT;RIGHT;POSTERIOR;LOWER

## 2025-01-01 ASSESSMENT — PAIN DESCRIPTION - LOCATION
LOCATION: BACK;HIP;ELBOW
LOCATION: ELBOW;HIP;BACK
LOCATION: BACK;HIP;ELBOW
LOCATION: BACK;ELBOW;HIP

## 2025-01-01 ASSESSMENT — PAIN DESCRIPTION - ONSET
ONSET: SUDDEN
ONSET: ON-GOING
ONSET: SUDDEN

## 2025-01-01 ASSESSMENT — PAIN DESCRIPTION - FREQUENCY
FREQUENCY: CONTINUOUS

## 2025-01-01 ASSESSMENT — PAIN DESCRIPTION - PAIN TYPE
TYPE: ACUTE PAIN
TYPE: CHRONIC PAIN
TYPE: ACUTE PAIN
TYPE: CHRONIC PAIN
TYPE: CHRONIC PAIN
TYPE: ACUTE PAIN

## 2025-01-01 NOTE — PROGRESS NOTES
End of Shift Note    Bedside shift change report given to Jessica JOHNSON (oncoming nurse) by Wendy Galarza, RN (offgoing nurse).  Report included the following information SBAR, Kardex, Intake/Output, MAR, and Recent Results    Shift worked:  day     Shift summary and any significant changes:     VS stable, pt upadlib, prn dilaudid given for complaints of pain, order given by Dr. Carballo to dc tele monitoring     Concerns for physician to address:       Zone phone for oncoming shift:   [phone]       Activity:  Level of Assistance: Independent  Number times ambulated in hallways past shift: 0  Number of times OOB to chair past shift: 0    Cardiac:   Cardiac Monitoring: No      Cardiac Rhythm: Sinus rhythm    Access:  Current line(s): PIV     Genitourinary:   Urinary Status: Voiding    Respiratory:   O2 Device: None (Room air)  Chronic home O2 use?: NO  Incentive spirometer at bedside: YES    GI:  Last BM (including prior to admit): 12/26/24  Current diet:  ADULT DIET; Regular  Passing flatus: YES    Pain Management:   Patient states pain is manageable on current regimen: YES    Skin:  Carlo Scale Score: 21  Interventions: Wound Offloading (Prevention Methods): Bed, pressure reduction mattress, Blankets, Elevate heels, Pillows, Repositioning, Turning, Walker    Patient Safety:  Fall Risk: Nursing Judgement-Fall Risk High(Add Comments): Yes  Fall Risk Interventions  Nursing Judgement-Fall Risk High(Add Comments): Yes  Toilet Every 2 Hours-In Advance of Need: Yes  Hourly Visual Checks: In bed  Fall Visual Posted: Armband, Fall sign posted, Socks  Room Door Open: Deferred to promote rest  Alarm On: Bed, Chair  Patient Moved Closer to Nursing Station: No    Active Consults:   IP CONSULT TO HEMATOLOGY    Length of Stay:  Expected LOS: 6  Actual LOS: 3    Wendy Galarza, RN

## 2025-01-01 NOTE — PROGRESS NOTES
General Daily Progress Note    Admit Date: 12/28/2024    Subjective:     Patient continues to complain of pain    Current Facility-Administered Medications   Medication Dose Route Frequency    pantoprazole (PROTONIX) tablet 40 mg  40 mg Oral QAM AC    HYDROmorphone HCl PF (DILAUDID) injection 0.25 mg  0.25 mg IntraVENous Q3H PRN    Or    HYDROmorphone HCl PF (DILAUDID) injection 0.5 mg  0.5 mg IntraVENous Q3H PRN    0.45 % sodium chloride infusion   IntraVENous Continuous    ondansetron (ZOFRAN) injection 4 mg  4 mg IntraVENous Q6H PRN    diphenhydrAMINE (BENADRYL) capsule 25 mg  25 mg Oral Q6H PRN    amLODIPine (NORVASC) tablet 5 mg  5 mg Oral Daily    fentaNYL (DURAGESIC) 75 MCG/HR 1 patch  1 patch TransDERmal Q72H    FLUoxetine (PROZAC) capsule 10 mg  10 mg Oral Daily    folic acid (FOLVITE) tablet 1,000 mcg  1,000 mcg Oral Daily    hydroxyurea (HYDREA) chemo capsule 500 mg  500 mg Oral BID    promethazine (PHENERGAN) tablet 25 mg  25 mg Oral Q8H PRN    losartan (COZAAR) tablet 100 mg  100 mg Oral Daily    vitamin B-12 (CYANOCOBALAMIN) tablet 1,000 mcg  1,000 mcg Oral Daily    sodium chloride flush 0.9 % injection 5-40 mL  5-40 mL IntraVENous 2 times per day    sodium chloride flush 0.9 % injection 5-40 mL  5-40 mL IntraVENous PRN    0.9 % sodium chloride infusion   IntraVENous PRN    potassium chloride (KLOR-CON M) extended release tablet 40 mEq  40 mEq Oral PRN    Or    potassium bicarb-citric acid (EFFER-K) effervescent tablet 40 mEq  40 mEq Oral PRN    Or    potassium chloride 10 mEq/100 mL IVPB (Peripheral Line)  10 mEq IntraVENous PRN    magnesium sulfate 2000 mg in 50 mL IVPB premix  2,000 mg IntraVENous PRN    enoxaparin (LOVENOX) injection 40 mg  40 mg SubCUTAneous Daily    polyethylene glycol (GLYCOLAX) packet 17 g  17 g Oral Daily PRN    acetaminophen (TYLENOL) tablet 650 mg  650 mg Oral Q6H PRN    Or    acetaminophen (TYLENOL) suppository 650 mg  650 mg Rectal Q6H PRN    hydrocortisone 1 % cream

## 2025-01-01 NOTE — PROGRESS NOTES
End of Shift Note    Bedside shift change report given to Miguel Angel JANG (oncoming nurse) by Wendy Galarza, RN (offgoing nurse).  Report included the following information SBAR, Kardex, Intake/Output, MAR, and Recent Results    Shift worked:  day     Shift summary and any significant changes:     VS stable, pt upadlib, prn dilaudid given for complaints of pain, order given by Dr. Carballo to dc tele monitoring     Concerns for physician to address:       Zone phone for oncoming shift:   [phone]       Activity:  Level of Assistance: Independent  Number times ambulated in hallways past shift: 0  Number of times OOB to chair past shift: 0    Cardiac:   Cardiac Monitoring: No      Cardiac Rhythm: Sinus rhythm    Access:  Current line(s): PIV     Genitourinary:   Urinary Status: Voiding, Bathroom privileges    Respiratory:   O2 Device: None (Room air)  Chronic home O2 use?: NO  Incentive spirometer at bedside: YES    GI:  Last BM (including prior to admit): 12/26/24  Current diet:  ADULT DIET; Regular  Passing flatus: YES    Pain Management:   Patient states pain is manageable on current regimen: YES    Skin:  Carlo Scale Score: 21  Interventions: Wound Offloading (Prevention Methods): Bed, pressure reduction mattress, Elevate heels, Pillows, Repositioning, Turning    Patient Safety:  Fall Risk: Nursing Judgement-Fall Risk High(Add Comments): Yes  Fall Risk Interventions  Nursing Judgement-Fall Risk High(Add Comments): Yes  Toilet Every 2 Hours-In Advance of Need: Yes  Hourly Visual Checks: Awake  Fall Visual Posted: Armband  Room Door Open: Yes  Alarm On: Bed  Patient Moved Closer to Nursing Station: No    Active Consults:   IP CONSULT TO HEMATOLOGY    Length of Stay:  Expected LOS: 6  Actual LOS: 3    Wendy Galarza, RN

## 2025-01-01 NOTE — PLAN OF CARE
Problem: Pain  Goal: Verbalizes/displays adequate comfort level or baseline comfort level  1/1/2025 0832 by Wendy Galarza, RN  Outcome: Progressing  12/31/2024 2259 by Isidro Coughlin, RN  Outcome: Progressing     Problem: Safety - Adult  Goal: Free from fall injury  1/1/2025 0832 by Wendy Galarza, RN  Outcome: Progressing  12/31/2024 2259 by Isidro Coughlin, RN  Outcome: Progressing

## 2025-01-01 NOTE — PROGRESS NOTES
Attempted to contact Dr. Carballo notified per pt request for prilosec 20 mg daily be added to her regimen here, however both answering service and perfect serve stating no physician on call today. Dr. Villaseñor notified via perfect serve regarding pt's request since no one on call for Dr. Carballo. Call back number [phone] provided.

## 2025-01-01 NOTE — PLAN OF CARE
Problem: Pain  Goal: Verbalizes/displays adequate comfort level or baseline comfort level  1/1/2025 1240 by Jessica Barbosa LPN  Outcome: Progressing  1/1/2025 0832 by Wendy Galarza, RN  Outcome: Progressing  12/31/2024 2259 by Isidro Coughlin, RN  Outcome: Progressing     Problem: Safety - Adult  Goal: Free from fall injury  1/1/2025 1240 by Jessica Barbosa LPN  Outcome: Progressing  1/1/2025 0832 by Wendy Galarza, RN  Outcome: Progressing  12/31/2024 2259 by Isidro Coughlin, RN  Outcome: Progressing

## 2025-01-02 LAB
ANION GAP SERPL CALC-SCNC: 3 MMOL/L (ref 2–12)
BASOPHILS # BLD: 0 K/UL (ref 0–0.1)
BASOPHILS NFR BLD: 0 % (ref 0–1)
BUN SERPL-MCNC: 9 MG/DL (ref 6–20)
BUN/CREAT SERPL: 10 (ref 12–20)
CALCIUM SERPL-MCNC: 8.9 MG/DL (ref 8.5–10.1)
CHLORIDE SERPL-SCNC: 102 MMOL/L (ref 97–108)
CO2 SERPL-SCNC: 31 MMOL/L (ref 21–32)
CREAT SERPL-MCNC: 0.88 MG/DL (ref 0.55–1.02)
DIFFERENTIAL METHOD BLD: ABNORMAL
EOSINOPHIL # BLD: 0.2 K/UL (ref 0–0.4)
EOSINOPHIL NFR BLD: 3 % (ref 0–7)
ERYTHROCYTE [DISTWIDTH] IN BLOOD BY AUTOMATED COUNT: 15.3 % (ref 11.5–14.5)
GLUCOSE SERPL-MCNC: 107 MG/DL (ref 65–100)
HCT VFR BLD AUTO: 23.1 % (ref 35–47)
HGB BLD-MCNC: 8.3 G/DL (ref 11.5–16)
IMM GRANULOCYTES # BLD AUTO: 0 K/UL (ref 0–0.04)
IMM GRANULOCYTES NFR BLD AUTO: 0 % (ref 0–0.5)
LYMPHOCYTES # BLD: 2.4 K/UL (ref 0.8–3.5)
LYMPHOCYTES NFR BLD: 46 % (ref 12–49)
MCH RBC QN AUTO: 39.5 PG (ref 26–34)
MCHC RBC AUTO-ENTMCNC: 35.9 G/DL (ref 30–36.5)
MCV RBC AUTO: 110 FL (ref 80–99)
MONOCYTES # BLD: 1 K/UL (ref 0–1)
MONOCYTES NFR BLD: 20 % (ref 5–13)
NEUTS SEG # BLD: 1.6 K/UL (ref 1.8–8)
NEUTS SEG NFR BLD: 31 % (ref 32–75)
NRBC # BLD: 0.21 K/UL (ref 0–0.01)
NRBC BLD-RTO: 4.1 PER 100 WBC
PLATELET # BLD AUTO: 173 K/UL (ref 150–400)
PMV BLD AUTO: 11.1 FL (ref 8.9–12.9)
POTASSIUM SERPL-SCNC: 4 MMOL/L (ref 3.5–5.1)
RBC # BLD AUTO: 2.1 M/UL (ref 3.8–5.2)
RBC MORPH BLD: ABNORMAL
SODIUM SERPL-SCNC: 136 MMOL/L (ref 136–145)
WBC # BLD AUTO: 5.2 K/UL (ref 3.6–11)

## 2025-01-02 PROCEDURE — 6360000002 HC RX W HCPCS: Performed by: STUDENT IN AN ORGANIZED HEALTH CARE EDUCATION/TRAINING PROGRAM

## 2025-01-02 PROCEDURE — 6370000000 HC RX 637 (ALT 250 FOR IP): Performed by: STUDENT IN AN ORGANIZED HEALTH CARE EDUCATION/TRAINING PROGRAM

## 2025-01-02 PROCEDURE — 1100000003 HC PRIVATE W/ TELEMETRY

## 2025-01-02 PROCEDURE — 2500000003 HC RX 250 WO HCPCS: Performed by: STUDENT IN AN ORGANIZED HEALTH CARE EDUCATION/TRAINING PROGRAM

## 2025-01-02 PROCEDURE — 36415 COLL VENOUS BLD VENIPUNCTURE: CPT

## 2025-01-02 PROCEDURE — 99221 1ST HOSP IP/OBS SF/LOW 40: CPT | Performed by: INTERNAL MEDICINE

## 2025-01-02 PROCEDURE — 2580000003 HC RX 258: Performed by: INTERNAL MEDICINE

## 2025-01-02 PROCEDURE — 85025 COMPLETE CBC W/AUTO DIFF WBC: CPT

## 2025-01-02 PROCEDURE — 6360000002 HC RX W HCPCS: Performed by: INTERNAL MEDICINE

## 2025-01-02 PROCEDURE — 80048 BASIC METABOLIC PNL TOTAL CA: CPT

## 2025-01-02 RX ORDER — HYDROMORPHONE HYDROCHLORIDE 1 MG/ML
0.25 INJECTION, SOLUTION INTRAMUSCULAR; INTRAVENOUS; SUBCUTANEOUS EVERY 8 HOURS PRN
Status: DISCONTINUED | OUTPATIENT
Start: 2025-01-02 | End: 2025-01-03

## 2025-01-02 RX ORDER — HYDROMORPHONE HYDROCHLORIDE 1 MG/ML
0.5 INJECTION, SOLUTION INTRAMUSCULAR; INTRAVENOUS; SUBCUTANEOUS EVERY 8 HOURS PRN
Status: DISCONTINUED | OUTPATIENT
Start: 2025-01-02 | End: 2025-01-03

## 2025-01-02 RX ORDER — HYDROCODONE BITARTRATE AND ACETAMINOPHEN 10; 325 MG/1; MG/1
1 TABLET ORAL EVERY 6 HOURS PRN
Status: DISCONTINUED | OUTPATIENT
Start: 2025-01-02 | End: 2025-01-04 | Stop reason: HOSPADM

## 2025-01-02 RX ADMIN — FLUOXETINE HYDROCHLORIDE 10 MG: 10 CAPSULE ORAL at 09:21

## 2025-01-02 RX ADMIN — SODIUM CHLORIDE, PRESERVATIVE FREE 10 ML: 5 INJECTION INTRAVENOUS at 19:48

## 2025-01-02 RX ADMIN — LOSARTAN POTASSIUM 100 MG: 100 TABLET, FILM COATED ORAL at 09:21

## 2025-01-02 RX ADMIN — PANTOPRAZOLE SODIUM 40 MG: 40 TABLET, DELAYED RELEASE ORAL at 05:50

## 2025-01-02 RX ADMIN — HYDROMORPHONE HYDROCHLORIDE 0.5 MG: 1 INJECTION, SOLUTION INTRAMUSCULAR; INTRAVENOUS; SUBCUTANEOUS at 19:47

## 2025-01-02 RX ADMIN — HYDROMORPHONE HYDROCHLORIDE 0.5 MG: 1 INJECTION, SOLUTION INTRAMUSCULAR; INTRAVENOUS; SUBCUTANEOUS at 22:51

## 2025-01-02 RX ADMIN — ONDANSETRON 4 MG: 2 INJECTION INTRAMUSCULAR; INTRAVENOUS at 09:21

## 2025-01-02 RX ADMIN — CYANOCOBALAMIN TAB 500 MCG 1000 MCG: 500 TAB at 09:21

## 2025-01-02 RX ADMIN — AMLODIPINE BESYLATE 5 MG: 5 TABLET ORAL at 09:21

## 2025-01-02 RX ADMIN — HYDROMORPHONE HYDROCHLORIDE 0.5 MG: 1 INJECTION, SOLUTION INTRAMUSCULAR; INTRAVENOUS; SUBCUTANEOUS at 09:20

## 2025-01-02 RX ADMIN — ONDANSETRON 4 MG: 2 INJECTION INTRAMUSCULAR; INTRAVENOUS at 22:50

## 2025-01-02 RX ADMIN — HYDROMORPHONE HYDROCHLORIDE 0.5 MG: 1 INJECTION, SOLUTION INTRAMUSCULAR; INTRAVENOUS; SUBCUTANEOUS at 13:06

## 2025-01-02 RX ADMIN — SODIUM CHLORIDE: 4.5 INJECTION, SOLUTION INTRAVENOUS at 20:02

## 2025-01-02 RX ADMIN — ENOXAPARIN SODIUM 40 MG: 100 INJECTION SUBCUTANEOUS at 09:22

## 2025-01-02 RX ADMIN — HYDROMORPHONE HYDROCHLORIDE 0.5 MG: 1 INJECTION, SOLUTION INTRAMUSCULAR; INTRAVENOUS; SUBCUTANEOUS at 16:19

## 2025-01-02 RX ADMIN — ONDANSETRON 4 MG: 2 INJECTION INTRAMUSCULAR; INTRAVENOUS at 16:20

## 2025-01-02 RX ADMIN — HYDROMORPHONE HYDROCHLORIDE 0.5 MG: 1 INJECTION, SOLUTION INTRAMUSCULAR; INTRAVENOUS; SUBCUTANEOUS at 05:50

## 2025-01-02 RX ADMIN — HYDROXYUREA 500 MG: 500 CAPSULE ORAL at 22:52

## 2025-01-02 RX ADMIN — POLYETHYLENE GLYCOL 3350 17 G: 17 POWDER, FOR SOLUTION ORAL at 09:31

## 2025-01-02 RX ADMIN — HYDROXYUREA 500 MG: 500 CAPSULE ORAL at 09:21

## 2025-01-02 RX ADMIN — DIPHENHYDRAMINE HYDROCHLORIDE 25 MG: 25 CAPSULE ORAL at 22:52

## 2025-01-02 RX ADMIN — HYDROMORPHONE HYDROCHLORIDE 0.5 MG: 1 INJECTION, SOLUTION INTRAMUSCULAR; INTRAVENOUS; SUBCUTANEOUS at 02:43

## 2025-01-02 RX ADMIN — DIPHENHYDRAMINE HYDROCHLORIDE 25 MG: 25 CAPSULE ORAL at 16:19

## 2025-01-02 RX ADMIN — SODIUM CHLORIDE, PRESERVATIVE FREE 10 ML: 5 INJECTION INTRAVENOUS at 09:20

## 2025-01-02 RX ADMIN — FOLIC ACID 1000 MCG: 1 TABLET ORAL at 09:21

## 2025-01-02 RX ADMIN — DIPHENHYDRAMINE HYDROCHLORIDE 25 MG: 25 CAPSULE ORAL at 09:20

## 2025-01-02 ASSESSMENT — PAIN DESCRIPTION - DESCRIPTORS
DESCRIPTORS: ACHING

## 2025-01-02 ASSESSMENT — PAIN DESCRIPTION - PAIN TYPE
TYPE: ACUTE PAIN
TYPE: ACUTE PAIN

## 2025-01-02 ASSESSMENT — PAIN DESCRIPTION - ORIENTATION
ORIENTATION: UPPER
ORIENTATION: RIGHT;LEFT;POSTERIOR
ORIENTATION: RIGHT;LEFT
ORIENTATION: RIGHT;LEFT
ORIENTATION: RIGHT;LEFT;POSTERIOR
ORIENTATION: UPPER
ORIENTATION: RIGHT;LEFT;POSTERIOR

## 2025-01-02 ASSESSMENT — PAIN - FUNCTIONAL ASSESSMENT
PAIN_FUNCTIONAL_ASSESSMENT: PREVENTS OR INTERFERES SOME ACTIVE ACTIVITIES AND ADLS
PAIN_FUNCTIONAL_ASSESSMENT: ACTIVITIES ARE NOT PREVENTED
PAIN_FUNCTIONAL_ASSESSMENT: PREVENTS OR INTERFERES SOME ACTIVE ACTIVITIES AND ADLS
PAIN_FUNCTIONAL_ASSESSMENT: ACTIVITIES ARE NOT PREVENTED

## 2025-01-02 ASSESSMENT — PAIN SCALES - GENERAL
PAINLEVEL_OUTOF10: 7
PAINLEVEL_OUTOF10: 9
PAINLEVEL_OUTOF10: 7
PAINLEVEL_OUTOF10: 2
PAINLEVEL_OUTOF10: 8
PAINLEVEL_OUTOF10: 5
PAINLEVEL_OUTOF10: 8
PAINLEVEL_OUTOF10: 7
PAINLEVEL_OUTOF10: 2
PAINLEVEL_OUTOF10: 2
PAINLEVEL_OUTOF10: 5
PAINLEVEL_OUTOF10: 9
PAINLEVEL_OUTOF10: 4
PAINLEVEL_OUTOF10: 5

## 2025-01-02 ASSESSMENT — PAIN DESCRIPTION - FREQUENCY
FREQUENCY: INTERMITTENT
FREQUENCY: INTERMITTENT

## 2025-01-02 ASSESSMENT — PAIN DESCRIPTION - LOCATION
LOCATION: BACK;ELBOW;HIP
LOCATION: BACK;HIP;ELBOW
LOCATION: BACK;ELBOW;HIP
LOCATION: BACK;HIP;ELBOW

## 2025-01-02 ASSESSMENT — PAIN DESCRIPTION - ONSET
ONSET: SUDDEN
ONSET: SUDDEN

## 2025-01-02 NOTE — PROGRESS NOTES
End of Shift Note    Bedside shift change report given to Tone (oncoming nurse) by Isidro Coughlin RN (offgoing nurse).  Report included the following information SBAR, Kardex, Intake/Output, MAR, and Recent Results    Shift worked:  Night     Shift summary and any significant changes:    None     Concerns for physician to address: None     Zone phone for oncoming shift:  [phone]       Activity:  Level of Assistance: Independent  Number times ambulated in hallways past shift: 0  Number of times OOB to chair past shift: 0    Cardiac:   Cardiac Monitoring: No      Cardiac Rhythm: Sinus rhythm    Access:  Current line(s): PIV     Genitourinary:   Urinary Status: Voiding, Bathroom privileges    Respiratory:   O2 Device: None (Room air)  Chronic home O2 use?: NO  Incentive spirometer at bedside: YES    GI:  Last BM (including prior to admit): 12/26/24  Current diet:  ADULT DIET; Regular  Passing flatus: YES    Pain Management:   Patient states pain is manageable on current regimen: YES    Skin:  Carlo Scale Score: 22  Interventions: Wound Offloading (Prevention Methods): Bed, pressure redistribution/air, Elevate heels, Pillows, Repositioning    Patient Safety:  Fall Risk: Nursing Judgement-Fall Risk High(Add Comments): Yes  Fall Risk Interventions  Nursing Judgement-Fall Risk High(Add Comments): Yes  Toilet Every 2 Hours-In Advance of Need: Yes  Hourly Visual Checks: Awake  Fall Visual Posted: Armband  Room Door Open: Yes  Alarm On: Bed  Patient Moved Closer to Nursing Station: No    Active Consults:   IP CONSULT TO HEMATOLOGY    Length of Stay:  Expected LOS: 6  Actual LOS: 4    Isidro Coughlin RN

## 2025-01-02 NOTE — PLAN OF CARE
Problem: Pain  Goal: Verbalizes/displays adequate comfort level or baseline comfort level  1/2/2025 0001 by Isidro Coughlin RN  Outcome: Progressing  1/1/2025 1240 by Jessica Barbosa LPN  Outcome: Progressing     Problem: Safety - Adult  Goal: Free from fall injury  1/2/2025 0001 by Isidro Coughlin RN  Outcome: Progressing  1/1/2025 1240 by Jessica Barbosa LPN  Outcome: Progressing

## 2025-01-02 NOTE — PLAN OF CARE
Problem: Pain  Goal: Verbalizes/displays adequate comfort level or baseline comfort level  1/2/2025 1041 by Tone Cortes, RN  Outcome: Progressing  1/2/2025 0001 by Isidro Coughlin, RN  Outcome: Progressing     Problem: Safety - Adult  Goal: Free from fall injury  1/2/2025 1041 by Tone Cortes, RN  Outcome: Progressing  1/2/2025 0001 by Isidro Coughlin, RN  Outcome: Progressing

## 2025-01-03 PROCEDURE — 6370000000 HC RX 637 (ALT 250 FOR IP): Performed by: STUDENT IN AN ORGANIZED HEALTH CARE EDUCATION/TRAINING PROGRAM

## 2025-01-03 PROCEDURE — 2580000003 HC RX 258: Performed by: INTERNAL MEDICINE

## 2025-01-03 PROCEDURE — 6360000002 HC RX W HCPCS: Performed by: STUDENT IN AN ORGANIZED HEALTH CARE EDUCATION/TRAINING PROGRAM

## 2025-01-03 PROCEDURE — 2500000003 HC RX 250 WO HCPCS: Performed by: STUDENT IN AN ORGANIZED HEALTH CARE EDUCATION/TRAINING PROGRAM

## 2025-01-03 PROCEDURE — 1100000003 HC PRIVATE W/ TELEMETRY

## 2025-01-03 PROCEDURE — 6370000000 HC RX 637 (ALT 250 FOR IP): Performed by: INTERNAL MEDICINE

## 2025-01-03 RX ADMIN — ONDANSETRON 4 MG: 2 INJECTION INTRAMUSCULAR; INTRAVENOUS at 20:31

## 2025-01-03 RX ADMIN — AMLODIPINE BESYLATE 5 MG: 5 TABLET ORAL at 08:01

## 2025-01-03 RX ADMIN — CYANOCOBALAMIN TAB 500 MCG 1000 MCG: 500 TAB at 08:01

## 2025-01-03 RX ADMIN — HYDROCODONE BITARTRATE AND ACETAMINOPHEN 1 TABLET: 10; 325 TABLET ORAL at 01:55

## 2025-01-03 RX ADMIN — ONDANSETRON 4 MG: 2 INJECTION INTRAMUSCULAR; INTRAVENOUS at 08:07

## 2025-01-03 RX ADMIN — LOSARTAN POTASSIUM 100 MG: 100 TABLET, FILM COATED ORAL at 08:02

## 2025-01-03 RX ADMIN — FLUOXETINE HYDROCHLORIDE 10 MG: 10 CAPSULE ORAL at 08:01

## 2025-01-03 RX ADMIN — DIPHENHYDRAMINE HYDROCHLORIDE 25 MG: 25 CAPSULE ORAL at 08:07

## 2025-01-03 RX ADMIN — HYDROCODONE BITARTRATE AND ACETAMINOPHEN 1 TABLET: 10; 325 TABLET ORAL at 14:20

## 2025-01-03 RX ADMIN — PANTOPRAZOLE SODIUM 40 MG: 40 TABLET, DELAYED RELEASE ORAL at 06:49

## 2025-01-03 RX ADMIN — FOLIC ACID 1000 MCG: 1 TABLET ORAL at 08:01

## 2025-01-03 RX ADMIN — HYDROXYUREA 500 MG: 500 CAPSULE ORAL at 20:32

## 2025-01-03 RX ADMIN — SODIUM CHLORIDE, PRESERVATIVE FREE 10 ML: 5 INJECTION INTRAVENOUS at 22:44

## 2025-01-03 RX ADMIN — DIPHENHYDRAMINE HYDROCHLORIDE 25 MG: 25 CAPSULE ORAL at 20:32

## 2025-01-03 RX ADMIN — HYDROXYUREA 500 MG: 500 CAPSULE ORAL at 08:00

## 2025-01-03 RX ADMIN — ACETAMINOPHEN 650 MG: 325 TABLET ORAL at 20:32

## 2025-01-03 RX ADMIN — HYDROCODONE BITARTRATE AND ACETAMINOPHEN 1 TABLET: 10; 325 TABLET ORAL at 20:31

## 2025-01-03 RX ADMIN — ENOXAPARIN SODIUM 40 MG: 100 INJECTION SUBCUTANEOUS at 08:02

## 2025-01-03 RX ADMIN — SODIUM CHLORIDE: 4.5 INJECTION, SOLUTION INTRAVENOUS at 09:39

## 2025-01-03 RX ADMIN — HYDROCODONE BITARTRATE AND ACETAMINOPHEN 1 TABLET: 10; 325 TABLET ORAL at 08:01

## 2025-01-03 ASSESSMENT — PAIN SCALES - GENERAL
PAINLEVEL_OUTOF10: 6
PAINLEVEL_OUTOF10: 6
PAINLEVEL_OUTOF10: 7
PAINLEVEL_OUTOF10: 2
PAINLEVEL_OUTOF10: 0
PAINLEVEL_OUTOF10: 2
PAINLEVEL_OUTOF10: 5
PAINLEVEL_OUTOF10: 2

## 2025-01-03 ASSESSMENT — PAIN DESCRIPTION - PAIN TYPE: TYPE: ACUTE PAIN

## 2025-01-03 ASSESSMENT — PAIN DESCRIPTION - ORIENTATION
ORIENTATION: UPPER
ORIENTATION: ANTERIOR;LOWER
ORIENTATION: RIGHT;LEFT;POSTERIOR
ORIENTATION: RIGHT;LEFT;POSTERIOR

## 2025-01-03 ASSESSMENT — PAIN DESCRIPTION - DESCRIPTORS
DESCRIPTORS: ACHING

## 2025-01-03 ASSESSMENT — PAIN DESCRIPTION - LOCATION
LOCATION: GROIN
LOCATION: BACK;ELBOW;HIP

## 2025-01-03 ASSESSMENT — PAIN DESCRIPTION - ONSET: ONSET: SUDDEN

## 2025-01-03 ASSESSMENT — PAIN DESCRIPTION - FREQUENCY: FREQUENCY: INTERMITTENT

## 2025-01-03 ASSESSMENT — PAIN - FUNCTIONAL ASSESSMENT: PAIN_FUNCTIONAL_ASSESSMENT: ACTIVITIES ARE NOT PREVENTED

## 2025-01-03 NOTE — PLAN OF CARE
Problem: Pain  Goal: Verbalizes/displays adequate comfort level or baseline comfort level  1/3/2025 1026 by Tone Cortes RN  Outcome: Progressing  1/3/2025 0550 by Vitaliy Breen RN  Outcome: Progressing  Flowsheets (Taken 1/3/2025 0548)  Verbalizes/displays adequate comfort level or baseline comfort level:   Encourage patient to monitor pain and request assistance   Assess pain using appropriate pain scale   Administer analgesics based on type and severity of pain and evaluate response   Implement non-pharmacological measures as appropriate and evaluate response   Consider cultural and social influences on pain and pain management   Notify Licensed Independent Practitioner if interventions unsuccessful or patient reports new pain  1/3/2025 0548 by Vitaliy Breen RN  Outcome: Progressing  Flowsheets (Taken 1/3/2025 0548)  Verbalizes/displays adequate comfort level or baseline comfort level:   Encourage patient to monitor pain and request assistance   Assess pain using appropriate pain scale   Administer analgesics based on type and severity of pain and evaluate response   Implement non-pharmacological measures as appropriate and evaluate response   Consider cultural and social influences on pain and pain management   Notify Licensed Independent Practitioner if interventions unsuccessful or patient reports new pain     Problem: Safety - Adult  Goal: Free from fall injury  1/3/2025 1026 by Tone Cortes RN  Outcome: Progressing  1/3/2025 0550 by Vitaliy Breen RN  Outcome: Progressing  Flowsheets (Taken 12/29/2024 1945 by Nannette Campos, RN)  Free From Fall Injury:   Instruct family/caregiver on patient safety   Based on caregiver fall risk screen, instruct family/caregiver to ask for assistance with transferring infant if caregiver noted to have fall risk factors  1/3/2025 0548 by Vitaliy Breen, RN  Outcome: Progressing  Flowsheets (Taken 12/29/2024 1945 by Nannette Campos RN)  Free From Fall  Injury:   Instruct family/caregiver on patient safety   Based on caregiver fall risk screen, instruct family/caregiver to ask for assistance with transferring infant if caregiver noted to have fall risk factors

## 2025-01-03 NOTE — PROGRESS NOTES
End of Shift Note    Bedside shift change report given to SUHAIL Wayne (oncoming nurse) by Vitaliy Breen RN (offgoing nurse).  Report included the following information SBAR, Procedure Summary, Intake/Output, MAR, Recent Results, and Cardiac Rhythm sinus rhythm    Shift worked:  night     Shift summary and any significant changes:     VSS, pt c/o severe pain, controlled with IV dilaudid and norco, pt getting up ad clifton to bathroom, voiding.     Concerns for physician to address:  See above     Zone phone for oncoming shift:   [phone]       Activity:  Level of Assistance: Independent  Number times ambulated in hallways past shift: 0  Number of times OOB to chair past shift: 0    Cardiac:   Cardiac Monitoring: Yes      Cardiac Rhythm: Sinus rhythm    Access:  Current line(s): PIV     Genitourinary:   Urinary Status: Voiding, Bathroom privileges    Respiratory:   O2 Device: None (Room air)  Chronic home O2 use?: NO  Incentive spirometer at bedside: N/A    GI:  Last BM (including prior to admit): 12/26/24  Current diet:  ADULT DIET; Regular  Passing flatus: YES    Pain Management:   Patient states pain is manageable on current regimen: YES    Skin:  Carlo Scale Score: 23  Interventions: Wound Offloading (Prevention Methods): Bed, pressure reduction mattress, Elevate heels, Pillows, Repositioning, Turning    Patient Safety:  Fall Risk: Nursing Judgement-Fall Risk High(Add Comments): Yes  Fall Risk Interventions  Nursing Judgement-Fall Risk High(Add Comments): Yes  Toilet Every 2 Hours-In Advance of Need: Yes  Hourly Visual Checks: Eyes closed, In bed  Fall Visual Posted: Fall sign posted, Socks  Room Door Open: Deferred to promote rest  Alarm On: Bed  Patient Moved Closer to Nursing Station: No    Active Consults:   IP CONSULT TO HEMATOLOGY    Length of Stay:  Expected LOS: 6  Actual LOS: 5    Vitaliy Breen RN                        '

## 2025-01-03 NOTE — PLAN OF CARE
Problem: Pain  Goal: Verbalizes/displays adequate comfort level or baseline comfort level  1/3/2025 0550 by Vitaliy Breen RN  Outcome: Progressing  Flowsheets (Taken 1/3/2025 0548)  Verbalizes/displays adequate comfort level or baseline comfort level:   Encourage patient to monitor pain and request assistance   Assess pain using appropriate pain scale   Administer analgesics based on type and severity of pain and evaluate response   Implement non-pharmacological measures as appropriate and evaluate response   Consider cultural and social influences on pain and pain management   Notify Licensed Independent Practitioner if interventions unsuccessful or patient reports new pain  1/3/2025 0548 by Vitaliy Breen, RN  Outcome: Progressing  Flowsheets (Taken 1/3/2025 0548)  Verbalizes/displays adequate comfort level or baseline comfort level:   Encourage patient to monitor pain and request assistance   Assess pain using appropriate pain scale   Administer analgesics based on type and severity of pain and evaluate response   Implement non-pharmacological measures as appropriate and evaluate response   Consider cultural and social influences on pain and pain management   Notify Licensed Independent Practitioner if interventions unsuccessful or patient reports new pain     Problem: Safety - Adult  Goal: Free from fall injury  1/3/2025 0550 by Vitaliy Breen RN  Outcome: Progressing  Flowsheets (Taken 12/29/2024 1945 by Nannette Campos RN)  Free From Fall Injury:   Instruct family/caregiver on patient safety   Based on caregiver fall risk screen, instruct family/caregiver to ask for assistance with transferring infant if caregiver noted to have fall risk factors  1/3/2025 0548 by Vitaliy Breen RN  Outcome: Progressing  Flowsheets (Taken 12/29/2024 1945 by Nannette Campos RN)  Free From Fall Injury:   Instruct family/caregiver on patient safety   Based on caregiver fall risk screen, instruct family/caregiver  to ask for assistance with transferring infant if caregiver noted to have fall risk factors     Problem: Gastrointestinal - Adult  Goal: Minimal or absence of nausea and vomiting  1/3/2025 0550 by Vitaliy Breen RN  Outcome: Progressing  Flowsheets (Taken 1/3/2025 0548)  Minimal or absence of nausea and vomiting:   Administer IV fluids as ordered to ensure adequate hydration   Administer ordered antiemetic medications as needed   Provide nonpharmacologic comfort measures as appropriate   Nutrition consult to assist patient with adequate nutrition and appropriate food choices  1/3/2025 0548 by Vitaliy Breen RN  Flowsheets (Taken 1/3/2025 0548)  Minimal or absence of nausea and vomiting:   Administer IV fluids as ordered to ensure adequate hydration   Administer ordered antiemetic medications as needed   Provide nonpharmacologic comfort measures as appropriate   Nutrition consult to assist patient with adequate nutrition and appropriate food choices  Goal: Maintains or returns to baseline bowel function  1/3/2025 0550 by Vitaliy Breen RN  Outcome: Progressing  Flowsheets (Taken 1/3/2025 0548)  Maintains or returns to baseline bowel function:   Assess bowel function   Encourage oral fluids to ensure adequate hydration   Administer IV fluids as ordered to ensure adequate hydration   Administer ordered medications as needed   Encourage mobilization and activity   Nutrition consult to assist patient with appropriate food choices  1/3/2025 0548 by Vitaliy Breen RN  Flowsheets (Taken 1/3/2025 0548)  Maintains or returns to baseline bowel function:   Assess bowel function   Encourage oral fluids to ensure adequate hydration   Administer IV fluids as ordered to ensure adequate hydration   Administer ordered medications as needed   Encourage mobilization and activity   Nutrition consult to assist patient with appropriate food choices  Goal: Maintains adequate nutritional intake  1/3/2025 0550 by Vitaliy Breen

## 2025-01-03 NOTE — PROGRESS NOTES
General Daily Progress Note    Admit Date: 12/28/2024    Subjective:     Patient continues to complain of pain but improving    Current Facility-Administered Medications   Medication Dose Route Frequency    HYDROcodone-acetaminophen (NORCO)  MG per tablet 1 tablet  1 tablet Oral Q6H PRN    HYDROmorphone HCl PF (DILAUDID) injection 0.25 mg  0.25 mg IntraVENous Q8H PRN    Or    HYDROmorphone HCl PF (DILAUDID) injection 0.5 mg  0.5 mg IntraVENous Q8H PRN    pantoprazole (PROTONIX) tablet 40 mg  40 mg Oral QAM AC    0.45 % sodium chloride infusion   IntraVENous Continuous    ondansetron (ZOFRAN) injection 4 mg  4 mg IntraVENous Q6H PRN    diphenhydrAMINE (BENADRYL) capsule 25 mg  25 mg Oral Q6H PRN    amLODIPine (NORVASC) tablet 5 mg  5 mg Oral Daily    fentaNYL (DURAGESIC) 75 MCG/HR 1 patch  1 patch TransDERmal Q72H    FLUoxetine (PROZAC) capsule 10 mg  10 mg Oral Daily    folic acid (FOLVITE) tablet 1,000 mcg  1,000 mcg Oral Daily    hydroxyurea (HYDREA) chemo capsule 500 mg  500 mg Oral BID    promethazine (PHENERGAN) tablet 25 mg  25 mg Oral Q8H PRN    losartan (COZAAR) tablet 100 mg  100 mg Oral Daily    vitamin B-12 (CYANOCOBALAMIN) tablet 1,000 mcg  1,000 mcg Oral Daily    sodium chloride flush 0.9 % injection 5-40 mL  5-40 mL IntraVENous 2 times per day    sodium chloride flush 0.9 % injection 5-40 mL  5-40 mL IntraVENous PRN    0.9 % sodium chloride infusion   IntraVENous PRN    potassium chloride (KLOR-CON M) extended release tablet 40 mEq  40 mEq Oral PRN    Or    potassium bicarb-citric acid (EFFER-K) effervescent tablet 40 mEq  40 mEq Oral PRN    Or    potassium chloride 10 mEq/100 mL IVPB (Peripheral Line)  10 mEq IntraVENous PRN    magnesium sulfate 2000 mg in 50 mL IVPB premix  2,000 mg IntraVENous PRN    enoxaparin (LOVENOX) injection 40 mg  40 mg SubCUTAneous Daily    polyethylene glycol (GLYCOLAX) packet 17 g  17 g Oral Daily PRN    acetaminophen (TYLENOL) tablet 650 mg  650 mg Oral Q6H PRN

## 2025-01-03 NOTE — PROGRESS NOTES
General Daily Progress Note    Admit Date: 12/28/2024    Subjective:     Patient continues to complain of pain but improving    Current Facility-Administered Medications   Medication Dose Route Frequency    HYDROcodone-acetaminophen (NORCO)  MG per tablet 1 tablet  1 tablet Oral Q6H PRN    pantoprazole (PROTONIX) tablet 40 mg  40 mg Oral QAM AC    0.45 % sodium chloride infusion   IntraVENous Continuous    ondansetron (ZOFRAN) injection 4 mg  4 mg IntraVENous Q6H PRN    diphenhydrAMINE (BENADRYL) capsule 25 mg  25 mg Oral Q6H PRN    amLODIPine (NORVASC) tablet 5 mg  5 mg Oral Daily    fentaNYL (DURAGESIC) 75 MCG/HR 1 patch  1 patch TransDERmal Q72H    FLUoxetine (PROZAC) capsule 10 mg  10 mg Oral Daily    folic acid (FOLVITE) tablet 1,000 mcg  1,000 mcg Oral Daily    hydroxyurea (HYDREA) chemo capsule 500 mg  500 mg Oral BID    promethazine (PHENERGAN) tablet 25 mg  25 mg Oral Q8H PRN    losartan (COZAAR) tablet 100 mg  100 mg Oral Daily    vitamin B-12 (CYANOCOBALAMIN) tablet 1,000 mcg  1,000 mcg Oral Daily    sodium chloride flush 0.9 % injection 5-40 mL  5-40 mL IntraVENous 2 times per day    sodium chloride flush 0.9 % injection 5-40 mL  5-40 mL IntraVENous PRN    0.9 % sodium chloride infusion   IntraVENous PRN    potassium chloride (KLOR-CON M) extended release tablet 40 mEq  40 mEq Oral PRN    Or    potassium bicarb-citric acid (EFFER-K) effervescent tablet 40 mEq  40 mEq Oral PRN    Or    potassium chloride 10 mEq/100 mL IVPB (Peripheral Line)  10 mEq IntraVENous PRN    magnesium sulfate 2000 mg in 50 mL IVPB premix  2,000 mg IntraVENous PRN    enoxaparin (LOVENOX) injection 40 mg  40 mg SubCUTAneous Daily    polyethylene glycol (GLYCOLAX) packet 17 g  17 g Oral Daily PRN    acetaminophen (TYLENOL) tablet 650 mg  650 mg Oral Q6H PRN    Or    acetaminophen (TYLENOL) suppository 650 mg  650 mg Rectal Q6H PRN    hydrocortisone 1 % cream   Topical BID        Review of Systems  Musculoskeletal:positive for

## 2025-01-04 VITALS
OXYGEN SATURATION: 98 % | DIASTOLIC BLOOD PRESSURE: 75 MMHG | SYSTOLIC BLOOD PRESSURE: 125 MMHG | TEMPERATURE: 97.9 F | HEIGHT: 70 IN | BODY MASS INDEX: 26.07 KG/M2 | WEIGHT: 182.1 LBS | RESPIRATION RATE: 18 BRPM | HEART RATE: 83 BPM

## 2025-01-04 PROCEDURE — 6370000000 HC RX 637 (ALT 250 FOR IP): Performed by: STUDENT IN AN ORGANIZED HEALTH CARE EDUCATION/TRAINING PROGRAM

## 2025-01-04 PROCEDURE — 6360000002 HC RX W HCPCS: Performed by: STUDENT IN AN ORGANIZED HEALTH CARE EDUCATION/TRAINING PROGRAM

## 2025-01-04 PROCEDURE — 2500000003 HC RX 250 WO HCPCS: Performed by: STUDENT IN AN ORGANIZED HEALTH CARE EDUCATION/TRAINING PROGRAM

## 2025-01-04 PROCEDURE — 2580000003 HC RX 258: Performed by: INTERNAL MEDICINE

## 2025-01-04 PROCEDURE — 6370000000 HC RX 637 (ALT 250 FOR IP): Performed by: INTERNAL MEDICINE

## 2025-01-04 RX ADMIN — SODIUM CHLORIDE, PRESERVATIVE FREE 5 ML: 5 INJECTION INTRAVENOUS at 09:00

## 2025-01-04 RX ADMIN — HYDROCORTISONE: 1 CREAM TOPICAL at 09:00

## 2025-01-04 RX ADMIN — CYANOCOBALAMIN TAB 500 MCG 1000 MCG: 500 TAB at 09:00

## 2025-01-04 RX ADMIN — HYDROCODONE BITARTRATE AND ACETAMINOPHEN 1 TABLET: 10; 325 TABLET ORAL at 09:06

## 2025-01-04 RX ADMIN — ACETAMINOPHEN 650 MG: 325 TABLET ORAL at 02:32

## 2025-01-04 RX ADMIN — FLUOXETINE HYDROCHLORIDE 10 MG: 10 CAPSULE ORAL at 08:59

## 2025-01-04 RX ADMIN — PANTOPRAZOLE SODIUM 40 MG: 40 TABLET, DELAYED RELEASE ORAL at 06:08

## 2025-01-04 RX ADMIN — HYDROXYUREA 500 MG: 500 CAPSULE ORAL at 08:59

## 2025-01-04 RX ADMIN — LOSARTAN POTASSIUM 100 MG: 100 TABLET, FILM COATED ORAL at 09:00

## 2025-01-04 RX ADMIN — ENOXAPARIN SODIUM 40 MG: 100 INJECTION SUBCUTANEOUS at 08:59

## 2025-01-04 RX ADMIN — AMLODIPINE BESYLATE 5 MG: 5 TABLET ORAL at 09:00

## 2025-01-04 RX ADMIN — FOLIC ACID 1000 MCG: 1 TABLET ORAL at 08:59

## 2025-01-04 RX ADMIN — SODIUM CHLORIDE: 4.5 INJECTION, SOLUTION INTRAVENOUS at 00:55

## 2025-01-04 RX ADMIN — HYDROCODONE BITARTRATE AND ACETAMINOPHEN 1 TABLET: 10; 325 TABLET ORAL at 02:32

## 2025-01-04 ASSESSMENT — PAIN SCALES - GENERAL
PAINLEVEL_OUTOF10: 4
PAINLEVEL_OUTOF10: 6
PAINLEVEL_OUTOF10: 3

## 2025-01-04 ASSESSMENT — PAIN DESCRIPTION - DESCRIPTORS: DESCRIPTORS: ACHING

## 2025-01-04 ASSESSMENT — PAIN - FUNCTIONAL ASSESSMENT: PAIN_FUNCTIONAL_ASSESSMENT: ACTIVITIES ARE NOT PREVENTED

## 2025-01-04 ASSESSMENT — PAIN DESCRIPTION - LOCATION: LOCATION: GROIN;HIP

## 2025-01-04 ASSESSMENT — PAIN DESCRIPTION - ORIENTATION: ORIENTATION: ANTERIOR

## 2025-01-04 NOTE — PROGRESS NOTES
End of Shift Note    Bedside shift change report given to SUHAIL Watkins (oncoming nurse) by Conchita Kim RN (offgoing nurse).  Report included the following information SBAR, Procedure Summary, Intake/Output, MAR, Recent Results, and Cardiac Rhythm sinus rhythm    Shift worked:  nights     Shift summary and any significant changes:    The patient had stable vital signs. She reported an improvement in back and groin pain after taking Norco. The patient is independent and has bathroom privileges for voiding. Her last bowel movement was on 1/3/25. She is on continuous fluids (1/2 NS) at 50 mL/hr. The patient is scheduled to be discharged today.     Concerns for physician to address:      Zone phone for oncoming shift:   1150       Activity:  Level of Assistance: Independent  Number times ambulated in hallways past shift: 0  Number of times OOB to chair past shift: 0    Cardiac:   Cardiac Monitoring: Yes      Cardiac Rhythm: Sinus rhythm    Access:  Current line(s): PIV     Genitourinary:   Urinary Status: Voiding, Bathroom privileges    Respiratory:   O2 Device: None (Room air)  Chronic home O2 use?: NO  Incentive spirometer at bedside: N/A    GI:  Last BM (including prior to admit): 01/03/25  Current diet:  ADULT DIET; Regular  Passing flatus: YES    Pain Management:   Patient states pain is manageable on current regimen: YES    Skin:  Carlo Scale Score: 22  Interventions: Wound Offloading (Prevention Methods): Bed, pressure reduction mattress, Pillows, Repositioning    Patient Safety:  Fall Risk: Nursing Judgement-Fall Risk High(Add Comments): Yes  Fall Risk Interventions  Nursing Judgement-Fall Risk High(Add Comments): Yes  Toilet Every 2 Hours-In Advance of Need: Yes  Hourly Visual Checks: Eyes closed, In bed  Fall Visual Posted: Fall sign posted, Socks  Room Door Open: Deferred to promote rest  Alarm On: Bed  Patient Moved Closer to Nursing Station: No    Active Consults:   IP CONSULT TO HEMATOLOGY    Length of  Stay:  Expected LOS: 7  Actual LOS: 6    Conchita Kim RN                        '

## 2025-01-04 NOTE — PLAN OF CARE
Problem: Pain  Goal: Verbalizes/displays adequate comfort level or baseline comfort level  Outcome: Progressing     Problem: Safety - Adult  Goal: Free from fall injury  Outcome: Progressing     Problem: Gastrointestinal - Adult  Goal: Minimal or absence of nausea and vomiting  Outcome: Progressing  Goal: Maintains or returns to baseline bowel function  Outcome: Progressing  Goal: Maintains adequate nutritional intake  Outcome: Progressing
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
## 2025-01-04 NOTE — PLAN OF CARE
Problem: Pain  Goal: Verbalizes/displays adequate comfort level or baseline comfort level  1/4/2025 1107 by June Sanchez RN  Outcome: Adequate for Discharge  1/4/2025 0139 by Conchita Kim RN  Outcome: Progressing     Problem: Safety - Adult  Goal: Free from fall injury  1/4/2025 1107 by June Sanchez RN  Outcome: Adequate for Discharge  Flowsheets (Taken 1/4/2025 0858)  Free From Fall Injury: Instruct family/caregiver on patient safety  1/4/2025 0140 by Conchita Kim RN  Outcome: Progressing  1/4/2025 0139 by Conchita Kim RN  Outcome: Progressing     Problem: Gastrointestinal - Adult  Goal: Minimal or absence of nausea and vomiting  1/4/2025 1107 by June Sanchez RN  Outcome: Adequate for Discharge  1/4/2025 0140 by Conchita Kim RN  Outcome: Progressing  1/4/2025 0139 by Conchita Kim RN  Outcome: Progressing  Goal: Maintains or returns to baseline bowel function  1/4/2025 1107 by June Sanchez RN  Outcome: Adequate for Discharge  1/4/2025 0140 by Conchita Kim RN  Outcome: Progressing  1/4/2025 0139 by Conchita Kim RN  Outcome: Progressing  Goal: Maintains adequate nutritional intake  1/4/2025 1107 by June Sanchez RN  Outcome: Adequate for Discharge  1/4/2025 0140 by Conchita Kim RN  Outcome: Progressing  1/4/2025 0139 by Conchita Kim RN  Outcome: Progressing

## 2025-01-04 NOTE — CARE COORDINATION
Transition of Care Plan:    RUR: 15%-\"Moderate Risk\"  Prior Level of Functioning: Independent in her ADLs and IADLs  Disposition: Home with follow-up appts  ACACIA: 1/4/25  If SNF or IPR: Date FOC offered: N/A  Follow up appointments: PCP & Specialists as indicated   DME needed: N/A  Transportation at discharge: The patient is driving herself home  IM/IMM Medicare/ letter given: 2nd IM letter given & signed today, 1/4/25  Is patient a [place] and connected with [facility]? N/A   If yes, was  transfer form completed and VA notified?   Caregiver Contact: Agustin Lynne, Brother, Phone: [phone]  Discharge Caregiver contacted prior to discharge? CM will contact her brother if she requests this  Care Conference needed? No  Barriers to discharge: N/A, patient is being discharged today, 1/4/25    CM was alerted that the patient is being discharged today, 1/4/25. The patient has no questions/concerns for discharge. She is driving herself home. 2nd IM letter was given and signed.     From CM perspective, the patient can be discharged.     Griselda López, [facility]  [phone]

## 2025-01-04 NOTE — DISCHARGE INSTRUCTIONS
General Discharge Instructions    Patient ID:  Rayna Lynne  [id number]  64 y.o.  1960    Patient Instructions          The following personal items were collected during your admission and were returned to you.        Take Home Medications     {Medication reconciliation information is now added to the patient's AVS automatically when it is printed.  There is no need to use this SmartLink in discharge instructions.  Highlight this text and delete it to clear this message}      What to do at Home    Recommended diet: regular diet    Recommended activity: activity as tolerated    Follow-up with Catrachito Carballo MD  in 3 days.        Information obtained by :  I understand that if any problems occur once I am at home I am to contact my physician.  I understand and acknowledge receipt of the instructions indicated above.                                                                                                                                           Physician's or R.N.'s Signature                                                                  Date/Time                                                                                                                                              Patient or Representative Signature                                                          Date/Time      
[place]

## 2025-01-29 ENCOUNTER — OFFICE VISIT (OUTPATIENT)
Facility: CLINIC | Age: 65
End: 2025-01-29

## 2025-01-29 VITALS
TEMPERATURE: 98.1 F | DIASTOLIC BLOOD PRESSURE: 62 MMHG | BODY MASS INDEX: 26.69 KG/M2 | SYSTOLIC BLOOD PRESSURE: 103 MMHG | RESPIRATION RATE: 16 BRPM | HEART RATE: 83 BPM | OXYGEN SATURATION: 100 % | WEIGHT: 186.4 LBS | HEIGHT: 70 IN

## 2025-01-29 DIAGNOSIS — F32.A ANXIETY AND DEPRESSION: ICD-10-CM

## 2025-01-29 DIAGNOSIS — F41.9 ANXIETY AND DEPRESSION: ICD-10-CM

## 2025-01-29 DIAGNOSIS — F11.20 OPIOID DEPENDENCE WITH CURRENT USE (HCC): ICD-10-CM

## 2025-01-29 DIAGNOSIS — K21.00 GASTROESOPHAGEAL REFLUX DISEASE WITH ESOPHAGITIS WITHOUT HEMORRHAGE: ICD-10-CM

## 2025-01-29 DIAGNOSIS — I10 PRIMARY HYPERTENSION: ICD-10-CM

## 2025-01-29 DIAGNOSIS — D57.00 SICKLE CELL CRISIS (HCC): Primary | ICD-10-CM

## 2025-01-29 DIAGNOSIS — D57.00 SICKLE CELL DISEASE WITH CRISIS (HCC): Primary | ICD-10-CM

## 2025-01-29 RX ORDER — OMEPRAZOLE 20 MG/1
20 TABLET, DELAYED RELEASE ORAL NIGHTLY
COMMUNITY

## 2025-01-29 SDOH — ECONOMIC STABILITY: FOOD INSECURITY: WITHIN THE PAST 12 MONTHS, THE FOOD YOU BOUGHT JUST DIDN'T LAST AND YOU DIDN'T HAVE MONEY TO GET MORE.: NEVER TRUE

## 2025-01-29 SDOH — ECONOMIC STABILITY: FOOD INSECURITY: WITHIN THE PAST 12 MONTHS, YOU WORRIED THAT YOUR FOOD WOULD RUN OUT BEFORE YOU GOT MONEY TO BUY MORE.: NEVER TRUE

## 2025-01-29 ASSESSMENT — ANXIETY QUESTIONNAIRES
4. TROUBLE RELAXING: SEVERAL DAYS
5. BEING SO RESTLESS THAT IT IS HARD TO SIT STILL: SEVERAL DAYS
IF YOU CHECKED OFF ANY PROBLEMS ON THIS QUESTIONNAIRE, HOW DIFFICULT HAVE THESE PROBLEMS MADE IT FOR YOU TO DO YOUR WORK, TAKE CARE OF THINGS AT HOME, OR GET ALONG WITH OTHER PEOPLE: SOMEWHAT DIFFICULT
GAD7 TOTAL SCORE: 7
1. FEELING NERVOUS, ANXIOUS, OR ON EDGE: SEVERAL DAYS
7. FEELING AFRAID AS IF SOMETHING AWFUL MIGHT HAPPEN: SEVERAL DAYS
3. WORRYING TOO MUCH ABOUT DIFFERENT THINGS: SEVERAL DAYS
2. NOT BEING ABLE TO STOP OR CONTROL WORRYING: SEVERAL DAYS
6. BECOMING EASILY ANNOYED OR IRRITABLE: SEVERAL DAYS

## 2025-01-29 ASSESSMENT — PATIENT HEALTH QUESTIONNAIRE - PHQ9
3. TROUBLE FALLING OR STAYING ASLEEP: NOT AT ALL
2. FEELING DOWN, DEPRESSED OR HOPELESS: NOT AT ALL
4. FEELING TIRED OR HAVING LITTLE ENERGY: NOT AT ALL
9. THOUGHTS THAT YOU WOULD BE BETTER OFF DEAD, OR OF HURTING YOURSELF: NOT AT ALL
5. POOR APPETITE OR OVEREATING: NOT AT ALL
SUM OF ALL RESPONSES TO PHQ QUESTIONS 1-9: 0
SUM OF ALL RESPONSES TO PHQ QUESTIONS 1-9: 0
1. LITTLE INTEREST OR PLEASURE IN DOING THINGS: NOT AT ALL
8. MOVING OR SPEAKING SO SLOWLY THAT OTHER PEOPLE COULD HAVE NOTICED. OR THE OPPOSITE, BEING SO FIGETY OR RESTLESS THAT YOU HAVE BEEN MOVING AROUND A LOT MORE THAN USUAL: NOT AT ALL
10. IF YOU CHECKED OFF ANY PROBLEMS, HOW DIFFICULT HAVE THESE PROBLEMS MADE IT FOR YOU TO DO YOUR WORK, TAKE CARE OF THINGS AT HOME, OR GET ALONG WITH OTHER PEOPLE: NOT DIFFICULT AT ALL
SUM OF ALL RESPONSES TO PHQ QUESTIONS 1-9: 0
SUM OF ALL RESPONSES TO PHQ QUESTIONS 1-9: 0
7. TROUBLE CONCENTRATING ON THINGS, SUCH AS READING THE NEWSPAPER OR WATCHING TELEVISION: NOT AT ALL
6. FEELING BAD ABOUT YOURSELF - OR THAT YOU ARE A FAILURE OR HAVE LET YOURSELF OR YOUR FAMILY DOWN: NOT AT ALL
SUM OF ALL RESPONSES TO PHQ9 QUESTIONS 1 & 2: 0
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
## 2025-01-30 RX ORDER — HYDROCODONE BITARTRATE AND ACETAMINOPHEN 10; 325 MG/1; MG/1
1 TABLET ORAL EVERY 6 HOURS PRN
Qty: 120 TABLET | Refills: 0 | Status: SHIPPED | OUTPATIENT
Start: 2025-01-30 | End: 2025-03-01

## 2025-02-01 ENCOUNTER — TRANSCRIBE ORDERS (OUTPATIENT)
Facility: HOSPITAL | Age: 65
End: 2025-02-01

## 2025-02-01 DIAGNOSIS — M54.16 RADICULOPATHY OF LUMBAR REGION: Primary | ICD-10-CM

## 2025-02-01 DIAGNOSIS — S39.012A STRAIN OF LUMBAR REGION, INITIAL ENCOUNTER: ICD-10-CM

## 2025-02-24 ENCOUNTER — HOSPITAL ENCOUNTER (OUTPATIENT)
Facility: HOSPITAL | Age: 65
Discharge: HOME | End: 2025-02-27
Attending: FAMILY MEDICINE
Payer: MEDICARE

## 2025-02-24 DIAGNOSIS — S39.012A STRAIN OF LUMBAR REGION, INITIAL ENCOUNTER: ICD-10-CM

## 2025-02-24 DIAGNOSIS — M54.16 RADICULOPATHY OF LUMBAR REGION: ICD-10-CM

## 2025-02-24 PROCEDURE — 72148 MRI LUMBAR SPINE W/O DYE: CPT

## 2025-03-05 DIAGNOSIS — F32.9 REACTIVE DEPRESSION: ICD-10-CM

## 2025-03-05 RX ORDER — FLUOXETINE 10 MG/1
10 CAPSULE ORAL DAILY
Qty: 90 CAPSULE | Refills: 3 | Status: SHIPPED | OUTPATIENT
Start: 2025-03-05

## 2025-03-10 DIAGNOSIS — F32.A ANXIETY AND DEPRESSION: ICD-10-CM

## 2025-03-10 DIAGNOSIS — D57.00 SICKLE CELL CRISIS (HCC): Primary | ICD-10-CM

## 2025-03-10 DIAGNOSIS — F41.9 ANXIETY AND DEPRESSION: ICD-10-CM

## 2025-03-12 RX ORDER — FENTANYL 75 UG/1
1 PATCH TRANSDERMAL
Qty: 10 PATCH | Refills: 0 | Status: SHIPPED | OUTPATIENT
Start: 2025-03-12 | End: 2025-04-11

## 2025-03-12 RX ORDER — HYDROCODONE BITARTRATE AND ACETAMINOPHEN 10; 325 MG/1; MG/1
1 TABLET ORAL EVERY 6 HOURS PRN
Qty: 120 TABLET | Refills: 0 | Status: SHIPPED | OUTPATIENT
Start: 2025-03-12 | End: 2025-04-11

## 2025-03-12 RX ORDER — PROMETHAZINE HYDROCHLORIDE 25 MG/1
25 TABLET ORAL 4 TIMES DAILY PRN
Qty: 60 TABLET | Refills: 0 | Status: SHIPPED | OUTPATIENT
Start: 2025-03-12

## 2025-03-23 NOTE — PROGRESS NOTES
Bedside and Verbal shift change report given to [facility] [facility] (oncoming nurse) by MADELYN Daugherty RN (offgoing nurse). Report given with SBAR, Kardex, Intake/Output, MAR and Recent Results. 23-Mar-2025 14:49

## 2025-04-15 DIAGNOSIS — D57.00 SICKLE CELL PAIN CRISIS (HCC): Primary | ICD-10-CM

## 2025-04-17 RX ORDER — HYDROCODONE BITARTRATE AND ACETAMINOPHEN 10; 325 MG/1; MG/1
1 TABLET ORAL EVERY 6 HOURS PRN
Qty: 120 TABLET | Refills: 0 | Status: SHIPPED | OUTPATIENT
Start: 2025-04-17 | End: 2025-05-17

## 2025-04-17 RX ORDER — HYDROXYUREA 500 MG/1
500 CAPSULE ORAL 2 TIMES DAILY
Qty: 60 CAPSULE | Refills: 11 | Status: SHIPPED | OUTPATIENT
Start: 2025-04-17

## 2025-04-29 ENCOUNTER — OFFICE VISIT (OUTPATIENT)
Facility: CLINIC | Age: 65
End: 2025-04-29
Payer: MEDICARE

## 2025-04-29 VITALS
BODY MASS INDEX: 27.05 KG/M2 | HEIGHT: 69 IN | WEIGHT: 182.6 LBS | SYSTOLIC BLOOD PRESSURE: 124 MMHG | HEART RATE: 94 BPM | TEMPERATURE: 98.1 F | RESPIRATION RATE: 16 BRPM | OXYGEN SATURATION: 100 % | DIASTOLIC BLOOD PRESSURE: 74 MMHG

## 2025-04-29 DIAGNOSIS — J04.0 LARYNGITIS: ICD-10-CM

## 2025-04-29 DIAGNOSIS — D57.00 SICKLE CELL DISEASE WITH CRISIS (HCC): ICD-10-CM

## 2025-04-29 DIAGNOSIS — J06.9 UPPER RESPIRATORY TRACT INFECTION, UNSPECIFIED TYPE: Primary | ICD-10-CM

## 2025-04-29 PROCEDURE — 3078F DIAST BP <80 MM HG: CPT | Performed by: INTERNAL MEDICINE

## 2025-04-29 PROCEDURE — 3074F SYST BP LT 130 MM HG: CPT | Performed by: INTERNAL MEDICINE

## 2025-04-29 PROCEDURE — G8427 DOCREV CUR MEDS BY ELIG CLIN: HCPCS | Performed by: INTERNAL MEDICINE

## 2025-04-29 PROCEDURE — 3017F COLORECTAL CA SCREEN DOC REV: CPT | Performed by: INTERNAL MEDICINE

## 2025-04-29 PROCEDURE — G8419 CALC BMI OUT NRM PARAM NOF/U: HCPCS | Performed by: INTERNAL MEDICINE

## 2025-04-29 PROCEDURE — 99213 OFFICE O/P EST LOW 20 MIN: CPT | Performed by: INTERNAL MEDICINE

## 2025-04-29 PROCEDURE — 1036F TOBACCO NON-USER: CPT | Performed by: INTERNAL MEDICINE

## 2025-04-29 SDOH — ECONOMIC STABILITY: FOOD INSECURITY: WITHIN THE PAST 12 MONTHS, YOU WORRIED THAT YOUR FOOD WOULD RUN OUT BEFORE YOU GOT MONEY TO BUY MORE.: NEVER TRUE

## 2025-04-29 SDOH — ECONOMIC STABILITY: FOOD INSECURITY: WITHIN THE PAST 12 MONTHS, THE FOOD YOU BOUGHT JUST DIDN'T LAST AND YOU DIDN'T HAVE MONEY TO GET MORE.: NEVER TRUE

## 2025-04-29 ASSESSMENT — PATIENT HEALTH QUESTIONNAIRE - PHQ9
6. FEELING BAD ABOUT YOURSELF - OR THAT YOU ARE A FAILURE OR HAVE LET YOURSELF OR YOUR FAMILY DOWN: NOT AT ALL
10. IF YOU CHECKED OFF ANY PROBLEMS, HOW DIFFICULT HAVE THESE PROBLEMS MADE IT FOR YOU TO DO YOUR WORK, TAKE CARE OF THINGS AT HOME, OR GET ALONG WITH OTHER PEOPLE: NOT DIFFICULT AT ALL
3. TROUBLE FALLING OR STAYING ASLEEP: NOT AT ALL
SUM OF ALL RESPONSES TO PHQ QUESTIONS 1-9: 0
2. FEELING DOWN, DEPRESSED OR HOPELESS: NOT AT ALL
4. FEELING TIRED OR HAVING LITTLE ENERGY: NOT AT ALL
8. MOVING OR SPEAKING SO SLOWLY THAT OTHER PEOPLE COULD HAVE NOTICED. OR THE OPPOSITE, BEING SO FIGETY OR RESTLESS THAT YOU HAVE BEEN MOVING AROUND A LOT MORE THAN USUAL: NOT AT ALL
SUM OF ALL RESPONSES TO PHQ QUESTIONS 1-9: 0
SUM OF ALL RESPONSES TO PHQ QUESTIONS 1-9: 0
1. LITTLE INTEREST OR PLEASURE IN DOING THINGS: NOT AT ALL
5. POOR APPETITE OR OVEREATING: NOT AT ALL
9. THOUGHTS THAT YOU WOULD BE BETTER OFF DEAD, OR OF HURTING YOURSELF: NOT AT ALL
SUM OF ALL RESPONSES TO PHQ QUESTIONS 1-9: 0
7. TROUBLE CONCENTRATING ON THINGS, SUCH AS READING THE NEWSPAPER OR WATCHING TELEVISION: NOT AT ALL

## 2025-04-29 ASSESSMENT — LIFESTYLE VARIABLES
HOW OFTEN DO YOU HAVE A DRINK CONTAINING ALCOHOL: NEVER
HOW MANY STANDARD DRINKS CONTAINING ALCOHOL DO YOU HAVE ON A TYPICAL DAY: PATIENT DOES NOT DRINK

## 2025-04-29 ASSESSMENT — ANXIETY QUESTIONNAIRES
IF YOU CHECKED OFF ANY PROBLEMS ON THIS QUESTIONNAIRE, HOW DIFFICULT HAVE THESE PROBLEMS MADE IT FOR YOU TO DO YOUR WORK, TAKE CARE OF THINGS AT HOME, OR GET ALONG WITH OTHER PEOPLE: NOT DIFFICULT AT ALL
4. TROUBLE RELAXING: NOT AT ALL
5. BEING SO RESTLESS THAT IT IS HARD TO SIT STILL: NOT AT ALL
6. BECOMING EASILY ANNOYED OR IRRITABLE: NOT AT ALL
7. FEELING AFRAID AS IF SOMETHING AWFUL MIGHT HAPPEN: NOT AT ALL
2. NOT BEING ABLE TO STOP OR CONTROL WORRYING: NOT AT ALL
1. FEELING NERVOUS, ANXIOUS, OR ON EDGE: NOT AT ALL
GAD7 TOTAL SCORE: 0
3. WORRYING TOO MUCH ABOUT DIFFERENT THINGS: NOT AT ALL

## 2025-04-29 NOTE — PROGRESS NOTES
Chief Complaint   Patient presents with    Medicare AWV     Patient states she is present for a annual exam.    Have you been to the ER, urgent care clinic since your last visit?  Hospitalized since your last visit?   NO    Have you seen or consulted any other health care providers outside our system since your last visit?   NO    Have you had a mammogram?”   NO    Date of last Mammogram: 5/3/2018      “Have you had a pap smear?”    NO    No cervical cancer screening on file

## 2025-04-30 NOTE — PROGRESS NOTES
[facility]  [address]   [address]  [facility] [address]  Phone:  [phone]  Fax: [phone]       Chief Complaint   Patient presents with    Medicare AWV   .      SUBJECTIVE:      History of Present Illness  The patient presents for evaluation of laryngitis.    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 A dry cough has developed, which is managed with cough drops. The cough is not severe at night but tends to worsen in the afternoon, particularly when she is active. Occasional chest discomfort is noted, which may be related to coughing.   Anxiety about potential cancer due to throat issues is expressed.   Self-medication with Sudafed has been attempted. Long-term use of Prilosec is reported for managing acid reflux.    Patient has a past history of sickle cell disease with SC hemoglobinopathy, she has chronic pain but is reasonably stable today.  She also has a history of primary hypertension.  The negative thing was the long-term I was recently there to have security and security in place.  No drains today I I will take the whole system just to make it go Monday normal anastomosis normal 1 consistent he will add patient she states no known allergies she is anemic.  She wants gel and wants to and she is her biggest problem for over medical of cigarette smoker Faye    Review of her history             Current Outpatient Medications   Medication Sig Dispense Refill    hydroxyurea (HYDREA) 500 MG chemo capsule Take 1 capsule by mouth 2 times daily 60 capsule 11    HYDROcodone-acetaminophen (NORCO)  MG per tablet Take 1 tablet by mouth every 6 hours as needed for Pain for up to 30 days. Intended supply: 30 days. [id number] Max Daily Amount: 4 tablets 120 tablet 0    promethazine (PHENERGAN) 25 MG tablet Take 1 tablet by mouth 4 times daily as needed for

## 2025-05-20 DIAGNOSIS — I10 PRIMARY HYPERTENSION: ICD-10-CM

## 2025-05-23 RX ORDER — TELMISARTAN 80 MG/1
80 TABLET ORAL NIGHTLY
Qty: 30 TABLET | Refills: 11 | Status: SHIPPED | OUTPATIENT
Start: 2025-05-23

## 2025-05-27 DIAGNOSIS — D57.00 SICKLE CELL DISEASE WITH CRISIS (HCC): Primary | ICD-10-CM

## 2025-05-28 RX ORDER — FENTANYL 75 UG/1
1 PATCH TRANSDERMAL
Qty: 10 PATCH | Refills: 0 | Status: SHIPPED | OUTPATIENT
Start: 2025-05-28 | End: 2025-06-27

## 2025-05-28 RX ORDER — HYDROCODONE BITARTRATE AND ACETAMINOPHEN 10; 325 MG/1; MG/1
1 TABLET ORAL EVERY 6 HOURS PRN
Qty: 120 TABLET | Refills: 0 | Status: SHIPPED | OUTPATIENT
Start: 2025-05-28 | End: 2025-06-27

## 2025-06-20 RX ORDER — FOLIC ACID 1 MG/1
1000 TABLET ORAL DAILY
Qty: 30 TABLET | Refills: 11 | Status: SHIPPED | OUTPATIENT
Start: 2025-06-20

## 2025-07-10 DIAGNOSIS — F11.20 OPIOID DEPENDENCE WITH CURRENT USE (HCC): Primary | ICD-10-CM

## 2025-07-10 RX ORDER — HYDROCODONE BITARTRATE AND ACETAMINOPHEN 10; 325 MG/1; MG/1
1 TABLET ORAL EVERY 6 HOURS PRN
Qty: 120 TABLET | Refills: 0 | Status: SHIPPED | OUTPATIENT
Start: 2025-07-10 | End: 2025-08-09

## 2025-07-29 ENCOUNTER — OFFICE VISIT (OUTPATIENT)
Facility: CLINIC | Age: 65
End: 2025-07-29

## 2025-07-29 VITALS
OXYGEN SATURATION: 99 % | HEIGHT: 69 IN | RESPIRATION RATE: 16 BRPM | BODY MASS INDEX: 26.97 KG/M2 | TEMPERATURE: 99 F | DIASTOLIC BLOOD PRESSURE: 74 MMHG | HEART RATE: 91 BPM | SYSTOLIC BLOOD PRESSURE: 125 MMHG

## 2025-07-29 DIAGNOSIS — B19.10 HEPATITIS B INFECTION WITHOUT DELTA AGENT WITHOUT HEPATIC COMA, UNSPECIFIED CHRONICITY: Primary | ICD-10-CM

## 2025-07-29 DIAGNOSIS — Z00.00 MEDICARE ANNUAL WELLNESS VISIT, SUBSEQUENT: ICD-10-CM

## 2025-07-29 DIAGNOSIS — B16.1 VIRAL HEPATITIS B WITH HEPATITIS DELTA, WITHOUT HEPATIC COMA, UNSPECIFIED CHRONICITY: ICD-10-CM

## 2025-07-29 DIAGNOSIS — D57.00 SICKLE CELL DISEASE WITH CRISIS (HCC): ICD-10-CM

## 2025-07-29 DIAGNOSIS — I10 PRIMARY HYPERTENSION: ICD-10-CM

## 2025-07-29 DIAGNOSIS — F41.9 ANXIETY AND DEPRESSION: ICD-10-CM

## 2025-07-29 DIAGNOSIS — F32.A ANXIETY AND DEPRESSION: ICD-10-CM

## 2025-07-29 SDOH — ECONOMIC STABILITY: FOOD INSECURITY: WITHIN THE PAST 12 MONTHS, THE FOOD YOU BOUGHT JUST DIDN'T LAST AND YOU DIDN'T HAVE MONEY TO GET MORE.: NEVER TRUE

## 2025-07-29 SDOH — ECONOMIC STABILITY: FOOD INSECURITY: WITHIN THE PAST 12 MONTHS, YOU WORRIED THAT YOUR FOOD WOULD RUN OUT BEFORE YOU GOT MONEY TO BUY MORE.: NEVER TRUE

## 2025-07-29 ASSESSMENT — PATIENT HEALTH QUESTIONNAIRE - PHQ9
SUM OF ALL RESPONSES TO PHQ QUESTIONS 1-9: 0
4. FEELING TIRED OR HAVING LITTLE ENERGY: NOT AT ALL
6. FEELING BAD ABOUT YOURSELF - OR THAT YOU ARE A FAILURE OR HAVE LET YOURSELF OR YOUR FAMILY DOWN: NOT AT ALL
3. TROUBLE FALLING OR STAYING ASLEEP: NOT AT ALL
8. MOVING OR SPEAKING SO SLOWLY THAT OTHER PEOPLE COULD HAVE NOTICED. OR THE OPPOSITE, BEING SO FIGETY OR RESTLESS THAT YOU HAVE BEEN MOVING AROUND A LOT MORE THAN USUAL: NOT AT ALL
SUM OF ALL RESPONSES TO PHQ QUESTIONS 1-9: 0
SUM OF ALL RESPONSES TO PHQ QUESTIONS 1-9: 0
1. LITTLE INTEREST OR PLEASURE IN DOING THINGS: NOT AT ALL
10. IF YOU CHECKED OFF ANY PROBLEMS, HOW DIFFICULT HAVE THESE PROBLEMS MADE IT FOR YOU TO DO YOUR WORK, TAKE CARE OF THINGS AT HOME, OR GET ALONG WITH OTHER PEOPLE: NOT DIFFICULT AT ALL
9. THOUGHTS THAT YOU WOULD BE BETTER OFF DEAD, OR OF HURTING YOURSELF: NOT AT ALL
SUM OF ALL RESPONSES TO PHQ QUESTIONS 1-9: 0
2. FEELING DOWN, DEPRESSED OR HOPELESS: NOT AT ALL
5. POOR APPETITE OR OVEREATING: NOT AT ALL
7. TROUBLE CONCENTRATING ON THINGS, SUCH AS READING THE NEWSPAPER OR WATCHING TELEVISION: NOT AT ALL

## 2025-07-29 ASSESSMENT — LIFESTYLE VARIABLES
HOW MANY STANDARD DRINKS CONTAINING ALCOHOL DO YOU HAVE ON A TYPICAL DAY: PATIENT DOES NOT DRINK
HOW OFTEN DO YOU HAVE A DRINK CONTAINING ALCOHOL: NEVER

## 2025-07-30 LAB — HBV SURFACE AG SER QL: NONREACTIVE

## 2025-07-31 LAB — HBV E AG SERPL QL IA: NEGATIVE

## 2025-08-04 ENCOUNTER — RESULTS FOLLOW-UP (OUTPATIENT)
Facility: CLINIC | Age: 65
End: 2025-08-04

## 2025-08-04 RX ORDER — PROMETHAZINE HYDROCHLORIDE 25 MG/1
25 TABLET ORAL 4 TIMES DAILY PRN
Qty: 60 TABLET | Refills: 11 | Status: SHIPPED | OUTPATIENT
Start: 2025-08-04

## 2025-08-13 DIAGNOSIS — K22.2 ESOPHAGEAL STRICTURE: ICD-10-CM

## 2025-08-13 DIAGNOSIS — D57.00 SICKLE CELL CRISIS (HCC): Primary | ICD-10-CM

## 2025-08-13 RX ORDER — HYDROCODONE BITARTRATE AND ACETAMINOPHEN 10; 325 MG/1; MG/1
1 TABLET ORAL EVERY 6 HOURS PRN
Qty: 120 TABLET | Refills: 0 | Status: SHIPPED | OUTPATIENT
Start: 2025-08-13 | End: 2025-09-12

## 2025-08-13 RX ORDER — FOLIC ACID 1 MG/1
1000 TABLET ORAL DAILY
Qty: 30 TABLET | Refills: 11 | Status: SHIPPED | OUTPATIENT
Start: 2025-08-13

## 2025-08-13 RX ORDER — FENTANYL 75 UG/1
1 PATCH TRANSDERMAL
Qty: 10 PATCH | Refills: 0 | Status: SHIPPED | OUTPATIENT
Start: 2025-08-13 | End: 2025-09-12